# Patient Record
Sex: FEMALE | Race: WHITE | NOT HISPANIC OR LATINO | Employment: OTHER | URBAN - NONMETROPOLITAN AREA
[De-identification: names, ages, dates, MRNs, and addresses within clinical notes are randomized per-mention and may not be internally consistent; named-entity substitution may affect disease eponyms.]

---

## 2017-01-05 DIAGNOSIS — E11.65 UNCONTROLLED TYPE 2 DIABETES MELLITUS WITH CHRONIC KIDNEY DISEASE, UNSPECIFIED CKD STAGE, UNSPECIFIED LONG TERM INSULIN USE STATUS: ICD-10-CM

## 2017-01-05 DIAGNOSIS — E11.22 UNCONTROLLED TYPE 2 DIABETES MELLITUS WITH CHRONIC KIDNEY DISEASE, UNSPECIFIED CKD STAGE, UNSPECIFIED LONG TERM INSULIN USE STATUS: ICD-10-CM

## 2017-01-05 DIAGNOSIS — N18.9 UNCONTROLLED TYPE 2 DIABETES MELLITUS WITH CHRONIC KIDNEY DISEASE, UNSPECIFIED CKD STAGE, UNSPECIFIED LONG TERM INSULIN USE STATUS: ICD-10-CM

## 2017-01-05 RX ORDER — LISINOPRIL 40 MG/1
40 TABLET ORAL DAILY
Qty: 90 TAB | Refills: 3 | Status: ON HOLD | OUTPATIENT
Start: 2017-01-05 | End: 2017-04-09

## 2017-01-26 ENCOUNTER — OFFICE VISIT (OUTPATIENT)
Dept: URGENT CARE | Facility: PHYSICIAN GROUP | Age: 67
End: 2017-01-26
Payer: MEDICARE

## 2017-01-26 ENCOUNTER — HOSPITAL ENCOUNTER (OUTPATIENT)
Dept: LAB | Facility: MEDICAL CENTER | Age: 67
End: 2017-01-26
Attending: INTERNAL MEDICINE
Payer: MEDICARE

## 2017-01-26 VITALS
HEIGHT: 64 IN | WEIGHT: 200 LBS | TEMPERATURE: 97.2 F | HEART RATE: 115 BPM | RESPIRATION RATE: 20 BRPM | BODY MASS INDEX: 34.15 KG/M2 | OXYGEN SATURATION: 88 % | SYSTOLIC BLOOD PRESSURE: 130 MMHG | DIASTOLIC BLOOD PRESSURE: 92 MMHG

## 2017-01-26 DIAGNOSIS — K60.2 ANAL FISSURE: ICD-10-CM

## 2017-01-26 DIAGNOSIS — K21.00 GASTROESOPHAGEAL REFLUX DISEASE WITH ESOPHAGITIS: ICD-10-CM

## 2017-01-26 DIAGNOSIS — K59.00 CONSTIPATION, UNSPECIFIED CONSTIPATION TYPE: ICD-10-CM

## 2017-01-26 LAB
ALBUMIN SERPL BCP-MCNC: 3.7 G/DL (ref 3.2–4.9)
ALBUMIN/GLOB SERPL: 1.2 G/DL
ALP SERPL-CCNC: 70 U/L (ref 30–99)
ALT SERPL-CCNC: 9 U/L (ref 2–50)
ANION GAP SERPL CALC-SCNC: 6 MMOL/L (ref 0–11.9)
AST SERPL-CCNC: 13 U/L (ref 12–45)
BILIRUB SERPL-MCNC: 0.5 MG/DL (ref 0.1–1.5)
BUN SERPL-MCNC: 22 MG/DL (ref 8–22)
CALCIUM SERPL-MCNC: 9.1 MG/DL (ref 8.5–10.5)
CHLORIDE SERPL-SCNC: 104 MMOL/L (ref 96–112)
CHOLEST SERPL-MCNC: 134 MG/DL (ref 100–199)
CO2 SERPL-SCNC: 27 MMOL/L (ref 20–33)
CREAT SERPL-MCNC: 1.32 MG/DL (ref 0.5–1.4)
CREAT UR-MCNC: 54.5 MG/DL
EST. AVERAGE GLUCOSE BLD GHB EST-MCNC: 169 MG/DL
GLOBULIN SER CALC-MCNC: 3.2 G/DL (ref 1.9–3.5)
GLUCOSE SERPL-MCNC: 119 MG/DL (ref 65–99)
HBA1C MFR BLD: 7.5 % (ref 0–5.6)
HDLC SERPL-MCNC: 39 MG/DL
LDLC SERPL CALC-MCNC: 81 MG/DL
MICROALBUMIN UR-MCNC: 87.6 MG/DL
MICROALBUMIN/CREAT UR: 1607 MG/G (ref 0–30)
POTASSIUM SERPL-SCNC: 4.5 MMOL/L (ref 3.6–5.5)
PROT SERPL-MCNC: 6.9 G/DL (ref 6–8.2)
SODIUM SERPL-SCNC: 137 MMOL/L (ref 135–145)
TRIGL SERPL-MCNC: 72 MG/DL (ref 0–149)

## 2017-01-26 PROCEDURE — 80053 COMPREHEN METABOLIC PANEL: CPT

## 2017-01-26 PROCEDURE — G8482 FLU IMMUNIZE ORDER/ADMIN: HCPCS | Performed by: PHYSICIAN ASSISTANT

## 2017-01-26 PROCEDURE — 3288F FALL RISK ASSESSMENT DOCD: CPT | Performed by: PHYSICIAN ASSISTANT

## 2017-01-26 PROCEDURE — 3017F COLORECTAL CA SCREEN DOC REV: CPT | Mod: 1P | Performed by: PHYSICIAN ASSISTANT

## 2017-01-26 PROCEDURE — 4040F PNEUMOC VAC/ADMIN/RCVD: CPT | Performed by: PHYSICIAN ASSISTANT

## 2017-01-26 PROCEDURE — 80061 LIPID PANEL: CPT

## 2017-01-26 PROCEDURE — 4004F PT TOBACCO SCREEN RCVD TLK: CPT | Performed by: PHYSICIAN ASSISTANT

## 2017-01-26 PROCEDURE — 1100F PTFALLS ASSESS-DOCD GE2>/YR: CPT | Performed by: PHYSICIAN ASSISTANT

## 2017-01-26 PROCEDURE — 99214 OFFICE O/P EST MOD 30 MIN: CPT | Performed by: PHYSICIAN ASSISTANT

## 2017-01-26 PROCEDURE — 0518F FALL PLAN OF CARE DOCD: CPT | Mod: 8P | Performed by: PHYSICIAN ASSISTANT

## 2017-01-26 PROCEDURE — 82570 ASSAY OF URINE CREATININE: CPT

## 2017-01-26 PROCEDURE — 36415 COLL VENOUS BLD VENIPUNCTURE: CPT

## 2017-01-26 PROCEDURE — 83036 HEMOGLOBIN GLYCOSYLATED A1C: CPT

## 2017-01-26 PROCEDURE — G8419 CALC BMI OUT NRM PARAM NOF/U: HCPCS | Performed by: PHYSICIAN ASSISTANT

## 2017-01-26 PROCEDURE — 82043 UR ALBUMIN QUANTITATIVE: CPT

## 2017-01-26 PROCEDURE — 3014F SCREEN MAMMO DOC REV: CPT | Mod: 8P | Performed by: PHYSICIAN ASSISTANT

## 2017-01-26 PROCEDURE — G8432 DEP SCR NOT DOC, RNG: HCPCS | Performed by: PHYSICIAN ASSISTANT

## 2017-01-26 RX ORDER — LIDOCAINE HYDROCHLORIDE 20 MG/ML
JELLY TOPICAL
Qty: 1 BOTTLE | Refills: 0 | Status: SHIPPED | OUTPATIENT
Start: 2017-01-26 | End: 2017-03-07

## 2017-01-26 RX ORDER — OMEPRAZOLE 20 MG/1
20 CAPSULE, DELAYED RELEASE ORAL DAILY
Qty: 90 CAP | Refills: 1 | Status: ON HOLD | OUTPATIENT
Start: 2017-01-26 | End: 2017-05-10

## 2017-01-26 ASSESSMENT — ENCOUNTER SYMPTOMS
PALPITATIONS: 0
HEARTBURN: 0
WEAKNESS: 0
WEIGHT LOSS: 0
DIARRHEA: 0
CONSTIPATION: 1
ABDOMINAL PAIN: 1
SHORTNESS OF BREATH: 0
DIAPHORESIS: 0
NAUSEA: 0
BACK PAIN: 0
COUGH: 0
RECTAL PAIN: 1
FEVER: 0
CHILLS: 0
VOMITING: 0
DIZZINESS: 0
BLOOD IN STOOL: 0

## 2017-01-26 NOTE — PROGRESS NOTES
Subjective:      Priya Callahan is a 66 y.o. female who presents with Constipation            Constipation  This is a recurrent problem. Episode onset: 6 days ago. The problem is unchanged. The stool is described as loose. The patient is not on a high fiber diet. She does not exercise regularly. There has not been adequate water intake. Associated symptoms include abdominal pain, hemorrhoids and rectal pain. Pertinent negatives include no back pain, diarrhea, difficulty urinating, fever, melena, nausea, vomiting or weight loss. She has tried stool softeners and laxatives for the symptoms.       Review of Systems   Constitutional: Positive for malaise/fatigue. Negative for fever, chills, weight loss and diaphoresis.   Respiratory: Negative for cough and shortness of breath.    Cardiovascular: Negative for chest pain and palpitations.   Gastrointestinal: Positive for abdominal pain, constipation, rectal pain and hemorrhoids. Negative for heartburn, nausea, vomiting, diarrhea, blood in stool and melena.   Genitourinary: Negative for difficulty urinating.   Musculoskeletal: Negative for back pain.   Neurological: Negative for dizziness and weakness.     All other systems reviewed and are negative.    PMH:  has a past medical history of COPD; ASTHMA; Hypertension; Pneumonia; Bronchitis; Unspecified urinary incontinence; Fall; Infectious disease; Arthritis; Fibromyalgia; Neuropathy (CMS-HCC); Other specified symptom associated with female genital organs; Diabetes; Hepatitis C; Congestive heart failure (CMS-[facility]) (2013); Indigestion; GERD (gastroesophageal reflux disease); Breath shortness; Sleep apnea; Disorder of thyroid; Backpain; Heart burn; Pain (9/13/2016); and Psychiatric problem (9/13/2016).  MEDS:   Current outpatient prescriptions:   •  lidocaine (URO-JET) 2 % Gel, Apply to affected area once daily as needed for pain relief., Disp: 1 Bottle, Rfl: 0  •  B-D UF III MINI PEN NEEDLES 31G X 5 MM Misc, USE TO  INJECT INSULIN DAILY, Disp: 100 Each, Rfl: 1  •  lisinopril (PRINIVIL, ZESTRIL) 40 MG tablet, Take 1 Tab by mouth every day., Disp: 90 Tab, Rfl: 3  •  FLUVIRIN PRESERVATIVE FREE 0.5 ML Suspension Prefilled Syringe, 0.5 mL by Intramuscular route Once., Disp: , Rfl:   •  oxycodone immediate release (ROXICODONE) 10 MG immediate release tablet, Take 1-2 Tabs by mouth every 6 hours as needed for Moderate Pain (back pain and knee pain)., Disp: 168 Tab, Rfl: 0  •  trazodone (DESYREL) 100 MG Tab, TAKE 3 TABLETS BY MOUTH NIGHTLY AT BEDTIME AS NEEDED FOR SLEEP, Disp: 90 Tab, Rfl: 3  •  LANTUS SOLOSTAR 100 UNIT/ML Solution Pen-injector injection, INJECT 5 TO 20 UNITS EVERY EVENING, Disp: 100 mL, Rfl: 1  •  pneumococcal 13-Lorrie Conj Vacc (PREVNAR 13) syringe, 0.5 mL by Intramuscular route Once PRN for up to 1 dose., Disp: 0.5 Each, Rfl: 0  •  Multiple Vitamins-Minerals (HAIR/SKIN/NAILS PO), Take  by mouth every day., Disp: , Rfl:   •  cyclobenzaprine (FLEXERIL) 10 MG Tab, TAKE 1 TABLET BY MOUTH THREE TIMES DAILY AS NEEDED FOR MILD PAIN, Disp: 90 Tab, Rfl: 11  •  lactulose 10 GM/15ML Solution, Take 30 mL by mouth 2 times a day as needed (constipation)., Disp: 500 mL, Rfl: 3  •  simvastatin (ZOCOR) 10 MG Tab, TAKE 1 TABLET BY MOUTH EVERY EVENING, Disp: 90 Tab, Rfl: 3  •  fluticasone-salmeterol (ADVAIR) 500-50 MCG/DOSE AEROSOL POWDER, BREATH ACTIVATED, Inhale 1 Puff by mouth every 12 hours., Disp: 1 Inhaler, Rfl: 5  •  Misc. Devices Misc, Life alert system., Disp: 1 Device, Rfl: 0  •  Misc. Devices Misc, Freestyle lite test strips; patient has DM type 2 Ell.65 with hyperglycemia and tests 3 times per day, Disp: 300 Strip, Rfl: 3  •  albuterol (PROVENTIL) 2.5mg/3ml Nebu Soln solution for nebulization, 3 mL by Nebulization route every four hours as needed for Shortness of Breath., Disp: 75 mL, Rfl: 3  •  escitalopram (LEXAPRO) 10 MG Tab, Take 1 Tab by mouth every day., Disp: 90 Tab, Rfl: 3  •  albuterol (VENTOLIN OR PROVENTIL) 108  (90 BASE) MCG/ACT Aero Soln inhalation aerosol, Inhale 2 Puffs by mouth every 6 hours as needed for Shortness of Breath., Disp: 8.5 g, Rfl: 3  •  gabapentin (NEURONTIN) 600 MG tablet, Take 1 Tab by mouth 3 times a day., Disp: 270 Tab, Rfl: 3  •  montelukast (SINGULAIR) 10 MG Tab, Take 1 Tab by mouth every day., Disp: 90 Tab, Rfl: 3  •  omeprazole (PRILOSEC) 20 MG delayed-release capsule, Take 1 Cap by mouth every day., Disp: 90 Cap, Rfl: 3  •  levothyroxine (SYNTHROID) 75 MCG TABS, TAKE 1 TABLET BY MOUTH EVERY DAY, Disp: 90 Tab, Rfl: 3  •  Non Formulary Request, Antioxidants, Disp: , Rfl:   •  lidocaine (XYLOCAINE) 5 % OINT, Apply  to affected area(s) as needed., Disp: , Rfl:   •  FREESTYLE LITE strip, USE TO TEST BLOOD SUGAR TWICE DAILY OR AS DIRECTED, Disp: 100 Strip, Rfl: 3  ALLERGIES:   Allergies   Allergen Reactions   • Keflex Rash     Sever rash   • Codeine Nausea     Severe stomach pain   • Food Diarrhea     Lemon and Lime allergy   • Lyrica Nausea     Nausea, dizzy   • Sudafed Nausea and Unspecified     Chest pain, nausea     SURGHX:   Past Surgical History   Procedure Laterality Date   • Gyn surgery       hysterectomy    • Other orthopedic surgery       L knee replacement    • Other orthopedic surgery       R carpal tunnel    • Us-needle core bx-breast panel     • Lumpectomy Left      Left breast   • Pr inj dx/ther agnt paravert facet joint, bruce* Left 7/10/2015     Procedure: INJ PARA FACET L/S 1 LVL W/IG - L3-S1;  Surgeon: Xavier Arteaga D.O.;  Location: SURGERY [facility]S;  Service: Pain Management   • Pr inj dx/ther agnt paravert facet joint, bruce*  7/10/2015     Procedure: INJ PARA FACET L/S 2D LVL W/IG;  Surgeon: Xavier Arteaga D.O.;  Location: SURGERY [facility] ORS;  Service: Pain Management   • Pr inj dx/ther agnt paravert facet joint, bruce*  7/10/2015     Procedure: INJ PARA FACET L/S 3D LVL W/IG;  Surgeon: Xavier Arteaga D.O.;  Location: [facility];  Service: Pain  Management   • Pr inject nerv blck,othr periph nerv  7/10/2015     Procedure: INJ-ANES AGENT-OTHER PHER.NRVE;  Surgeon: Xavier Arteaga D.O.;  Location: SURGERY SUR[facility];  Service: Pain Management   • Pr inj dx/ther agnt paravert facet joint, bruce* Left 7/17/2015     Procedure: INJ PARA FACET L/S 1 LVL W/IG - L3-S1;  Surgeon: Xavier Arteaga D.O.;  Location: SURGERY SURGICAL [facility];  Service: Pain Management   • Pr inj dx/ther agnt paravert facet joint, bruce*  7/17/2015     Procedure: INJ PARA FACET L/S 2D LVL W/IG;  Surgeon: Xavier Arteaga D.O.;  Location: SURGERY [facility];  Service: Pain Management   • Pr inj dx/ther agnt paravert facet joint, bruce*  7/17/2015     Procedure: INJ PARA FACET L/S 3D LVL W/IG;  Surgeon: Xavier Arteaga D.O.;  Location: SURGERY SURGICAL [facility] ORS;  Service: Pain Management   • Pr inject nerv blck,othr periph nerv  7/17/2015     Procedure: INJ-ANES AGENT-OTHER PHER.NRVE;  Surgeon: Xavier Arteaga D.O.;  Location: SURGERY SURGICAL [facility];  Service: Pain Management   • Pr dstr nrolytc agnt parverteb fct sngl lmbr/sacral Left 10/2/2015     Procedure: NEURO DEST FACET L/S W/IG SNGL - L3-S1;  Surgeon: Xavier Arteaga D.O.;  Location: SURGERY [facility] ORS;  Service: Pain Management   • Pr dstr nrolytc agnt parverteb fct addl lmbr/sacral  10/2/2015     Procedure: NEURO DEST FACET L/S W/IG ADDL;  Surgeon: Xavier Arteaga D.O.;  Location: SURGERY SURGICAL [facility];  Service: Pain Management   • Pr inject rx other periph nerve  10/2/2015     Procedure: NEUROLYTIC DEST-OTHER NERVE;  Surgeon: Xavier Arteaga D.O.;  Location: SURGERY SURGICAL [facility];  Service: Pain Management   • Pr dstr nrolytc agnt parverteb fct addl lmbr/sacral  10/2/2015     Procedure: NEURO DEST FACET L/S W/IG ADDL;  Surgeon: Xavier Arteaga D.O.;  Location: [facility];  Service: Pain Management   • Pr dstr nrolytc agnt parverteb fct sngl lmbr/sacral Right  11/6/2015     Procedure: NEURO DEST FACET L/S W/IG SNGL - L3-S1;  Surgeon: Xavier Arteaga D.O.;  Location: SURGERY [facility];  Service: Pain Management   • Pr dstr nrolytc agnt parverteb fct addl lmbr/sacral  11/6/2015     Procedure: NEURO DEST FACET L/S W/IG ADDL;  Surgeon: Xavier Arteaga D.O.;  Location: SURGERY [facility];  Service: Pain Management   • Pr inject rx other periph nerve  11/6/2015     Procedure: NEUROLYTIC DEST-OTHER NERVE;  Surgeon: Xavier Arteaga D.O.;  Location: SURGERY [facility];  Service: Pain Management   • Pr dstr nrolytc agnt parverteb fct addl lmbr/sacral  11/6/2015     Procedure: NEURO DEST FACET L/S W/IG ADDL;  Surgeon: Xavier Arteaga D.O.;  Location: SURGERY [facility];  Service: Pain Management   • Pr dstr nrolytc agnt parverteb fct sngl lmbr/sacral Left 9/16/2016     Procedure: NEURO DEST FACET L/S W/IG SNGL - L3-S1;  Surgeon: Xavier Arteaga D.O.;  Location: SURGERY [facility];  Service: Pain Management   • Pr dstr nrolytc agnt parverteb fct addl lmbr/sacral  9/16/2016     Procedure: NEURO DEST FACET L/S W/IG ADDL;  Surgeon: Xavier Arteaga D.O.;  Location: SURGERY [facility];  Service: Pain Management   • Pr dstr nrolytc agnt parverteb fct addl lmbr/sacral  9/16/2016     Procedure: NEURO DEST FACET L/S W/IG ADDL;  Surgeon: Xavier Arteaga D.O.;  Location: SURGERY [facility];  Service: Pain Management   • Pr fluoroscopic guidance needle placement  9/16/2016     Procedure: FLOURO GUIDE NEEDLE PLACEMENT;  Surgeon: Xavier Arteaga D.O.;  Location: SURGERY [facility];  Service: Pain Management     SOCHX:  reports that she has been smoking Cigarettes.  She has a 50 pack-year smoking history. She has never used smokeless tobacco. She reports that she does not drink alcohol or use illicit drugs.  : Family history was reviewed, no pertinent findings to report  Medications, Allergies, and current problem  "list reviewed today in Epic       Objective:     /92 mmHg  Pulse 115  Temp(Src) 36.2 °C (97.2 °F)  Resp 20  Ht 1.626 m (5' 4.02\")  Wt 90.719 kg (200 lb)  BMI 34.31 kg/m2  SpO2 88%     Physical Exam   Constitutional: She is oriented to person, place, and time. Vital signs are normal. She appears well-developed and well-nourished.   Neck: Normal range of motion. Neck supple.   Cardiovascular: Normal rate, regular rhythm, normal heart sounds, intact distal pulses and normal pulses.    Pulmonary/Chest: Effort normal and breath sounds normal.   Abdominal: Soft. Bowel sounds are normal. She exhibits no shifting dullness, no distension, no abdominal bruit, no pulsatile midline mass and no mass. There is tenderness. There is no rigidity, no rebound, no guarding, no CVA tenderness, no tenderness at McBurney's point and negative Troncoso's sign. No hernia.   Suprapubic area pain to palpation   Musculoskeletal: She exhibits no edema, tenderness or deformity.   Neurological: She is alert and oriented to person, place, and time. She has normal reflexes. She displays normal reflexes. She exhibits normal muscle tone. Coordination normal.   Skin: Skin is warm and dry.   Psychiatric: She has a normal mood and affect. Her behavior is normal. Judgment and thought content normal.   Vitals reviewed.              Assessment/Plan:   Patient is a 66-year-old renal presents with constipation and severe pain in the rectal area. 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 She states that it is extremely painful to defecate. She has some suprapubic pain. She denies nausea vomiting. She currently has an appetite. Last bowel movement was 6 days ago. She is taking numerous stool softeners and laxatives. Vital signs are normal except for some tachycardia. Lungs are clear to auscultation Abdomen: Soft, tenderness in suprapubic area, nondistended. Normal " bowel sounds. No hepatosplenomegaly or masses, or hernias. No rebound or guarding. Rectal vault was examined which contained a large amount of loose stool. Guaiac was done which showed positive. History reveals CT scans that show no mention of a fistula. She has been diagnosed with rectal tear in the past. Stool in the rectal vault made it difficult to evaluate for a anal fissure or hemorrhoids. Patient declines bowel prep and will trial lidocaine jelly for the rectum area to help with bowel movements. She is to follow-up with her primary care. ED precautions given to the patient.  1. Anal fissure    - lidocaine (URO-JET) 2 % Gel; Apply to affected area once daily as needed for pain relief.  Dispense: 1 Bottle; Refill: 0    2. Constipation, unspecified constipation type  -Liquids, laxatives, Fleet enema    Differential diagnosis, natural history, supportive care, and indications for immediate follow-up discussed at length.   Follow-up with primary care provider within 4-5 days, emergency room precautions discussed.  Patient and/or family appears understanding of information.

## 2017-01-26 NOTE — MR AVS SNAPSHOT
"        Priya Callahan   2017 12:10 PM   Office Visit   MRN: [id number]    Department:  [facility]   Dept Phone:  [phone]    Description:  Female : 1950   Provider:  Zenon Velásquez PA-C           Reason for Visit     Constipation severe pain in buttocks      Allergies as of 2017     Allergen Noted Reactions    Keflex 10/29/2013   Rash    Sever rash    Codeine 10/29/2013   Nausea    Severe stomach pain    Food 2013   Diarrhea    Lemon and Lime allergy    Lyrica 10/29/2013   Nausea    Nausea, dizzy    Sudafed 10/29/2013   Nausea, Unspecified    Chest pain, nausea      You were diagnosed with     Anal fissure   [565.0.ICD-9-CM]       Constipation, unspecified constipation type   [0200781]         Vital Signs     Blood Pressure Pulse Temperature Respirations Height Weight    130/92 mmHg 115 36.2 °C (97.2 °F) 20 1.626 m (5' 4.02\") 90.719 kg (200 lb)    Body Mass Index Oxygen Saturation Smoking Status             34.31 kg/m2 88% Current Every Day Smoker         Basic Information     Date Of Birth Sex Race Ethnicity Preferred Language    1950 Female White Non- English      Your appointments     2017  2:45 PM   Adult Draw/Collection with [facility] (--)    [address] [place] [address] [address] [place] [address] [phone]            2017  1:00 PM   Established Patient with Mickie Lawson M.D.   [facility] [place] ([place])    [address] [place] [address]  [place] [address] [phone]           You will be receiving a confirmation call a few days before your appointment from our automated call confirmation system.              Problem List              ICD-10-CM Priority Class Noted - Resolved    Morbid obesity (CMS-HCC) E66.01 Low  10/30/2013 - Present    DM type 2, uncontrolled, with renal complications (CMS-HCC) E11.29, E11.65 Medium  10/30/2013 - Present    Chronic pain G89.29 Low  10/30/2013 - Present    Iron deficiency " anemia D50.9 Medium  10/30/2013 - Present    Acute on chronic diastolic CHF (congestive heart failure), NYHA class 1 (CMS-HCC) I50.33   11/20/2013 - Present    Major depressive disorder F32.9   11/20/2013 - Present    COPD (chronic obstructive pulmonary disease) (CMS-HCC) J44.9   11/20/2013 - Present    HTN (hypertension) I10   11/20/2013 - Present    Chronic kidney disease, stage 3 N18.3   1/6/2014 - Present    Chronic knee pain M25.569, G89.29   2/25/2014 - Present    Osteoarthritis M19.90   2/25/2014 - Present    Insomnia G47.00   2/25/2014 - Present    Hair loss L65.9   2/25/2014 - Present    Hypothyroidism E03.9   4/1/2014 - Present    Vitamin D deficiency E55.9   6/12/2014 - Present    Lumbar spondylosis with myelopathy M47.16   6/17/2014 - Present    Nocturnal hypoxia G47.34   7/21/2014 - Present    Pain of right heel M79.671   7/21/2014 - Present    Chronic pain syndrome G89.4   8/28/2014 - Present    EDITH (obstructive sleep apnea) G47.33   1/27/2015 - Present    Unintended weight loss R63.4   1/27/2015 - Present    Encounter for long-term opiate analgesic use Z79.891   1/29/2015 - Present    Hypertensive heart disease with heart failure (CMS-HCC) I11.0 High  4/27/2015 - Present    Chronic respiratory failure (CMS-HCC) J96.10   4/28/2015 - Present    Chronic constipation K59.09   7/7/2015 - Present    Pulmonary nodules R91.8   7/7/2015 - Present    Lumbosacral spondylosis without myelopathy M47.817   7/10/2015 - Present    Osteoarthritis, knee M17.9   9/2/2015 - Present    Osteoarthritis of spine with radiculopathy, lumbar region M47.26   10/2/2015 - Present    Bilateral hand pain M79.641, M79.642   12/21/2015 - Present    Primary osteoarthritis of both hands M19.041, M19.042   12/21/2015 - Present    Gastroesophageal reflux disease with esophagitis K21.0   12/22/2015 - Present    Myoclonic jerking G25.3   4/12/2016 - Present    Cough R05   8/18/2016 - Present    Other spondylosis with radiculopathy, lumbar  region M47.26   9/16/2016 - Present    Tobacco abuse disorder Z72.0   9/27/2016 - Present    Diabetic foot infection (CMS-HCC) E11.69, L08.9   9/27/2016 - Present    Toenail fungus B35.1   11/21/2016 - Present      Health Maintenance        Date Due Completion Dates    RETINAL SCREENING 9/26/1967 ---    IMM DTaP/Tdap/Td Vaccine (1 - Tdap) 9/26/1968 ---    IMM ZOSTER VACCINE 9/26/2009 ---    BONE DENSITY 9/26/2014 ---    MAMMOGRAM 2/24/2016 2/24/2015, 2/24/2015, 2/24/2015, 2/24/2015, 2/24/2015, 2/17/2015    A1C SCREENING 11/27/2016 5/27/2016, 12/17/2015, 8/11/2015, 2/24/2015, 6/2/2014, 8/31/2012    FASTING LIPID PROFILE 12/17/2016 12/17/2015, 2/24/2015, 6/2/2014, 8/31/2012    URINE ACR / MICROALBUMIN 12/17/2016 12/17/2015, 2/24/2015, 8/31/2012    COLON CANCER SCREENING ANNUAL FIT 12/28/2016 12/28/2015, 3/7/2015    SERUM CREATININE 5/27/2017 5/27/2016, 12/17/2015, 8/11/2015, 5/8/2015, 3/19/2015, 2/27/2015, 2/24/2015, 6/2/2014, 1/13/2014, 11/22/2013, 11/21/2013, 11/20/2013, 11/19/2013, 11/14/2013, 11/13/2013, 11/12/2013, 11/11/2013, 11/10/2013, 11/9/2013, 11/8/2013, 11/7/2013, 11/6/2013, 11/5/2013, 11/4/2013, 11/3/2013, 11/2/2013, 11/1/2013, 10/31/2013, 10/30/2013, 10/29/2013, 8/31/2012    DIABETES MONOFILAMENT / LE EXAM 6/7/2017 6/7/2016            Current Immunizations     13-VALENT PCV PREVNAR 9/28/2016    Influenza TIV (IM) 9/27/2016, 11/11/2013 11:45 AM    Influenza Vaccine Adult HD 10/20/2015    Influenza Vaccine Quad Inj (Pf) 10/22/2013    Influenza Vaccine Quad Inj (Preserved) 9/12/2012    Pneumococcal Vaccine (UF)Historical Data 9/1/2012    Pneumococcal polysaccharide vaccine (PPSV-23) 12/11/2014, 10/22/2013      Below and/or attached are the medications your provider expects you to take. Review all of your home medications and newly ordered medications with your provider and/or pharmacist. Follow medication instructions as directed by your provider and/or pharmacist. Please keep your medication list with you  and share with your provider. Update the information when medications are discontinued, doses are changed, or new medications (including over-the-counter products) are added; and carry medication information at all times in the event of emergency situations     Allergies:  KEFLEX - Rash     CODEINE - Nausea     FOOD - Diarrhea     LYRICA - Nausea     SUDAFED - Nausea,Unspecified               Medications  Valid as of: January 26, 2017 -  1:30 PM    Generic Name Brand Name Tablet Size Instructions for use    Albuterol Sulfate (Aero Soln) albuterol 108 (90 BASE) MCG/ACT Inhale 2 Puffs by mouth every 6 hours as needed for Shortness of Breath.        Albuterol Sulfate (Nebu Soln) PROVENTIL 2.5mg/3ml 3 mL by Nebulization route every four hours as needed for Shortness of Breath.        Cyclobenzaprine HCl (Tab) FLEXERIL 10 MG TAKE 1 TABLET BY MOUTH THREE TIMES DAILY AS NEEDED FOR MILD PAIN        Escitalopram Oxalate (Tab) LEXAPRO 10 MG Take 1 Tab by mouth every day.        Fluticasone-Salmeterol (AEROSOL POWDER, BREATH ACTIVATED) ADVAIR 500-50 MCG/DOSE Inhale 1 Puff by mouth every 12 hours.        Gabapentin (Tab) NEURONTIN 600 MG Take 1 Tab by mouth 3 times a day.        Glucose Blood (Strip) FREESTYLE LITE  USE TO TEST BLOOD SUGAR TWICE DAILY OR AS DIRECTED        Influenza Vac Types A & B PF (Suspension Prefilled Syringe) FLUVIRIN PRESERVATIVE FREE 0.5 ML 0.5 mL by Intramuscular route Once.        Insulin Glargine (Solution Pen-injector) LANTUS SOLOSTAR 100 UNIT/ML INJECT 5 TO 20 UNITS EVERY EVENING        Insulin Pen Needle (Misc) B-D UF III MINI PEN NEEDLES 31G X 5 MM USE TO INJECT INSULIN DAILY        Lactulose (Solution) lactulose 10 GM/15ML Take 30 mL by mouth 2 times a day as needed (constipation).        Levothyroxine Sodium (Tab) SYNTHROID 75 MCG TAKE 1 TABLET BY MOUTH EVERY DAY        Lidocaine (Ointment) XYLOCAINE 5 % Apply  to affected area(s) as needed.        lidocaine (URO-JET) 2 % Gel (Gel) URO-JET 2 %  Apply to affected area once daily as needed for pain relief.        Lisinopril (Tab) PRINIVIL, ZESTRIL 40 MG Take 1 Tab by mouth every day.        Misc. Devices (Misc) Misc. Devices  Freestyle lite test strips; patient has DM type 2 Ell.65 with hyperglycemia and tests 3 times per day        Misc. Devices (Misc) Misc. Devices  Life alert system.        Montelukast Sodium (Tab) SINGULAIR 10 MG Take 1 Tab by mouth every day.        Multiple Vitamins-Minerals   Take  by mouth every day.        Non Formulary Request Non Formulary Request  Antioxidants        Omeprazole (CAPSULE DELAYED RELEASE) PRILOSEC 20 MG Take 1 Cap by mouth every day.        OxyCODONE HCl (Tab) ROXICODONE 10 MG Take 1-2 Tabs by mouth every 6 hours as needed for Moderate Pain (back pain and knee pain).        Pneumococcal 13-Lorrie Conj Vacc (Suspension) PREVNAR 13  0.5 mL by Intramuscular route Once PRN for up to 1 dose.        Simvastatin (Tab) ZOCOR 10 MG TAKE 1 TABLET BY MOUTH EVERY EVENING        TraZODone HCl (Tab) DESYREL 100 MG TAKE 3 TABLETS BY MOUTH NIGHTLY AT BEDTIME AS NEEDED FOR SLEEP        .                 Medicines prescribed today were sent to:     [facility]    [address] [place] [id number]    Phone: [phone] Fax: [phone]    Open 24 Hours?: No      Medication refill instructions:       If your prescription bottle indicates you have medication refills left, it is not necessary to call your provider’s office. Please contact your pharmacy and they will refill your medication.    If your prescription bottle indicates you do not have any refills left, you may request refills at any time through one of the following ways: The online [employer] system (except Urgent Care), by calling your provider’s office, or by asking your pharmacy to contact your provider’s office with a refill request. Medication refills are processed only during regular business hours and may not be available until the next  business day. Your provider may request additional information or to have a follow-up visit with you prior to refilling your medication.   *Please Note: Medication refills are assigned a new Rx number when refilled electronically. Your pharmacy may indicate that no refills were authorized even though a new prescription for the same medication is available at the pharmacy. Please request the medicine by name with the pharmacy before contacting your provider for a refill.        Other Notes About Your Plan     On pain contract with Dr. Lawson  Last UDS: 3/26/2015 Dr Lawson  Contolled Substance agreement signed: 8/28/2014    Comprehensive Medication Review: appt 11.18.2014 in [place].                 [facility] Status: Patient Declined

## 2017-01-31 DIAGNOSIS — Z79.899 MEDICATION MANAGEMENT: ICD-10-CM

## 2017-01-31 NOTE — TELEPHONE ENCOUNTER
Was the patient seen in the last year in this department? Yes  Appt scheduled for 2/8/17, needing RX immediately, having hard time breathing. RX was denied yesterday by another [facility]    Does patient have an active prescription for medications requested? No      Received Request Via: Patient

## 2017-02-08 ENCOUNTER — OFFICE VISIT (OUTPATIENT)
Dept: MEDICAL GROUP | Facility: PHYSICIAN GROUP | Age: 67
End: 2017-02-08
Payer: MEDICARE

## 2017-02-08 VITALS
TEMPERATURE: 98 F | OXYGEN SATURATION: 90 % | SYSTOLIC BLOOD PRESSURE: 122 MMHG | HEIGHT: 64 IN | WEIGHT: 211 LBS | HEART RATE: 78 BPM | DIASTOLIC BLOOD PRESSURE: 80 MMHG | BODY MASS INDEX: 36.02 KG/M2 | RESPIRATION RATE: 16 BRPM

## 2017-02-08 DIAGNOSIS — M47.26 OSTEOARTHRITIS OF SPINE WITH RADICULOPATHY, LUMBAR REGION: ICD-10-CM

## 2017-02-08 DIAGNOSIS — Z72.0 TOBACCO ABUSE DISORDER: ICD-10-CM

## 2017-02-08 DIAGNOSIS — J96.11 CHRONIC RESPIRATORY FAILURE WITH HYPOXIA (HCC): ICD-10-CM

## 2017-02-08 DIAGNOSIS — J43.9 PULMONARY EMPHYSEMA, UNSPECIFIED EMPHYSEMA TYPE (HCC): ICD-10-CM

## 2017-02-08 DIAGNOSIS — E03.9 HYPOTHYROIDISM, UNSPECIFIED TYPE: ICD-10-CM

## 2017-02-08 DIAGNOSIS — G89.29 CHRONIC KNEE PAIN, UNSPECIFIED LATERALITY: ICD-10-CM

## 2017-02-08 DIAGNOSIS — M47.16 LUMBAR SPONDYLOSIS WITH MYELOPATHY: ICD-10-CM

## 2017-02-08 DIAGNOSIS — Z79.891 CHRONIC USE OF OPIATE DRUGS THERAPEUTIC PURPOSES: ICD-10-CM

## 2017-02-08 DIAGNOSIS — N18.30 CHRONIC KIDNEY DISEASE, STAGE 3 (HCC): ICD-10-CM

## 2017-02-08 DIAGNOSIS — D50.9 IRON DEFICIENCY ANEMIA, UNSPECIFIED IRON DEFICIENCY ANEMIA TYPE: ICD-10-CM

## 2017-02-08 DIAGNOSIS — M25.569 CHRONIC KNEE PAIN, UNSPECIFIED LATERALITY: ICD-10-CM

## 2017-02-08 DIAGNOSIS — E55.9 VITAMIN D DEFICIENCY: ICD-10-CM

## 2017-02-08 DIAGNOSIS — Z12.11 SCREEN FOR COLON CANCER: ICD-10-CM

## 2017-02-08 DIAGNOSIS — M47.817 LUMBOSACRAL SPONDYLOSIS WITHOUT MYELOPATHY: ICD-10-CM

## 2017-02-08 DIAGNOSIS — Z79.891 ENCOUNTER FOR LONG-TERM OPIATE ANALGESIC USE: ICD-10-CM

## 2017-02-08 PROCEDURE — 99214 OFFICE O/P EST MOD 30 MIN: CPT | Performed by: INTERNAL MEDICINE

## 2017-02-08 PROCEDURE — 1036F TOBACCO NON-USER: CPT | Performed by: INTERNAL MEDICINE

## 2017-02-08 PROCEDURE — 1100F PTFALLS ASSESS-DOCD GE2>/YR: CPT | Performed by: INTERNAL MEDICINE

## 2017-02-08 PROCEDURE — G8432 DEP SCR NOT DOC, RNG: HCPCS | Performed by: INTERNAL MEDICINE

## 2017-02-08 PROCEDURE — G8419 CALC BMI OUT NRM PARAM NOF/U: HCPCS | Performed by: INTERNAL MEDICINE

## 2017-02-08 PROCEDURE — 3045F PR MOST RECENT HEMOGLOBIN A1C LEVEL 7.0-9.0%: CPT | Performed by: INTERNAL MEDICINE

## 2017-02-08 PROCEDURE — G8482 FLU IMMUNIZE ORDER/ADMIN: HCPCS | Performed by: INTERNAL MEDICINE

## 2017-02-08 PROCEDURE — 0518F FALL PLAN OF CARE DOCD: CPT | Mod: 8P | Performed by: INTERNAL MEDICINE

## 2017-02-08 PROCEDURE — 3014F SCREEN MAMMO DOC REV: CPT | Mod: 8P | Performed by: INTERNAL MEDICINE

## 2017-02-08 PROCEDURE — 4040F PNEUMOC VAC/ADMIN/RCVD: CPT | Performed by: INTERNAL MEDICINE

## 2017-02-08 PROCEDURE — 3288F FALL RISK ASSESSMENT DOCD: CPT | Performed by: INTERNAL MEDICINE

## 2017-02-08 PROCEDURE — 3017F COLORECTAL CA SCREEN DOC REV: CPT | Mod: 1P | Performed by: INTERNAL MEDICINE

## 2017-02-08 RX ORDER — OXYCODONE HYDROCHLORIDE 10 MG/1
10-20 TABLET ORAL EVERY 6 HOURS PRN
Qty: 168 TAB | Refills: 0 | Status: ON HOLD | OUTPATIENT
Start: 2017-02-08 | End: 2017-05-10

## 2017-02-08 RX ORDER — OXYCODONE HYDROCHLORIDE 10 MG/1
10-20 TABLET ORAL EVERY 6 HOURS PRN
Qty: 168 TAB | Refills: 0 | Status: SHIPPED | OUTPATIENT
Start: 2017-02-08 | End: 2017-02-08 | Stop reason: SDUPTHER

## 2017-02-08 ASSESSMENT — LIFESTYLE VARIABLES: HISTORY_ALCOHOL_USE: 0

## 2017-02-08 ASSESSMENT — ENCOUNTER SYMPTOMS: DEPRESSION: 1

## 2017-02-08 NOTE — ASSESSMENT & PLAN NOTE
Patient has type 2 diabetes and recently had mild worsening of control of disease. Previous hemoglobin A1c was 7%, most recent was 7.5%. She takes 20 units of insulin a day. She has noted some improvement recently with sugars in the low 100s in the morning. She does continue on ACE inhibitor and statin. We discussed continuing to monitor.

## 2017-02-08 NOTE — ASSESSMENT & PLAN NOTE
Patient has lumbar stenosis and has followed with Dr. Arteaga for injections and myself for pain management. Recently pain has worsened and we discussed increasing her gabapentin to 1200 mg twice per day. She cannot take anti-inflammatories due to kidney disease. She is not currently taking any Tylenol and I let her know she take 1000 mg up to 3 times a day.  Chronic pain recheck:   Last dose of controlled substance: this am  Chronic pain treated with oxycodone 10-20mg taken 2-3 times a day    She  reports that she does not drink alcohol.  She  reports that she does not use illicit drugs.    Consequences of Chronic Opiate therapy:  (5 A's)  Analgesia: Compared to no treatment or prior treatment, pain is currently not changed  Activity: not changed  Adverse Events: She denies constipation  Aberrant Behaviors: She reports she is taking medication as prescribed and is not veering from agreed treatment regimen. There have been no inappropriate refills or lost/stolen meds reported.   Affect/Mood: Pain is not impacting patient's mood.  Patient denies depression/anxiety.    Nonnarcotic treatments that are being used: muscle relaxers and Gabapentin. Treatment goals discussed.    Last order of CONTROLLED SUBSTANCE TREATMENT AGREEMENT was found on 6/7/2016 from Office Visit on 6/7/2016     UDS Summary     Patient has no health maintenance due at this time        Most recent UDS reviewed today and is consistent with prescribed medications.    Pain management agreement initiated/updated and signed on: 6/16  Urine drug screen done: 6/16, which was reviewed today and is consistent with prescribed medications.    I have reviewed the medical records, the Prescription Monitoring Program and I have determined that controlled substance treatment is medically indicated.

## 2017-02-08 NOTE — MR AVS SNAPSHOT
Priya Clarkpson   2017 1:00 PM   Office Visit   MRN: [id number]    Department:  [facility]   Dept Phone:  [phone]    Description:  Female : 1950   Provider:  Mickie Lawson M.D.           Reason for Visit     Results fv labs      Allergies as of 2017     Allergen Noted Reactions    Keflex 10/29/2013   Rash    Sever rash    Codeine 10/29/2013   Nausea    Severe stomach pain    Food 2013   Diarrhea    Lemon and Lime allergy    Lyrica 10/29/2013   Nausea    Nausea, dizzy    Sudafed 10/29/2013   Nausea, Unspecified    Chest pain, nausea      You were diagnosed with     Uncontrolled type 2 diabetes mellitus with stage 3 chronic kidney disease, with long-term current use of insulin (CMS-[facility])   [4747591]   Uncontrolled, will continue on insulin    Iron deficiency anemia, unspecified iron deficiency anemia type   [0301089]   Controlled, will recheck labs    Chronic kidney disease, stage 3   [539921]   Control, monitoring    Tobacco abuse disorder   [739306]   Controlled, recently quit    Vitamin D deficiency   [3123858]   Control, on vitamin D    Hypothyroidism, unspecified type   [0024865]   Controlled, on levothyroxine    Pulmonary emphysema, unspecified emphysema type (CMS-[facility])   [5653459]   Controlled, on inhalers    Chronic respiratory failure with hypoxia (CMS-HCC)   [910595]   Control, encourage patient to continue on oxygen at night    Chronic knee pain, unspecified laterality   [773218]   Control, patient continues with Dr. Arteaga and myself.    Osteoarthritis of spine with radiculopathy, lumbar region   [2108563]       Lumbar spondylosis with myelopathy   [776935]   Control, follows the clinic and Dr. Arteaga    Encounter for long-term opiate analgesic use   [582742]       Chronic use of opiate drugs therapeutic purposes   [7056305]       Screen for colon cancer   [294368]       Lumbosacral spondylosis without myelopathy   [721.3.ICD-9-CM]         Vital  "Signs     Blood Pressure Pulse Temperature Respirations Height Weight    122/80 mmHg 78 36.7 °C (98 °F) 16 1.626 m (5' 4\") 95.709 kg (211 lb)    Body Mass Index Oxygen Saturation Smoking Status             36.20 kg/m2 90% Former Smoker         Basic Information     Date Of Birth Sex Race Ethnicity Preferred Language    1950 Female White Non- English      Your appointments     May 02, 2017  2:20 PM   Diabetes Care Visit with Mickie Lawson M.D., [place] [facility] RN   [facility]    [address] [place] [address]  [place] [address] [phone]           You will be receiving a confirmation call a few days before your appointment from our automated call confirmation system.              Problem List              ICD-10-CM Priority Class Noted - Resolved    Morbid obesity (CMS-HCC) E66.01 Low  10/30/2013 - Present    DM type 2, uncontrolled, with renal complications (CMS-HCC) E11.29, E11.65 Medium  10/30/2013 - Present    Chronic pain G89.29 Low  10/30/2013 - Present    Iron deficiency anemia D50.9 Medium  10/30/2013 - Present    Acute on chronic diastolic CHF (congestive heart failure), NYHA class 1 (CMS-HCC) I50.33   11/20/2013 - Present    Major depressive disorder F32.9   11/20/2013 - Present    COPD (chronic obstructive pulmonary disease) (CMS-HCC) J44.9   11/20/2013 - Present    HTN (hypertension) I10   11/20/2013 - Present    Chronic kidney disease, stage 3 N18.3   1/6/2014 - Present    Chronic knee pain M25.569, G89.29   2/25/2014 - Present    Osteoarthritis M19.90   2/25/2014 - Present    Insomnia G47.00   2/25/2014 - Present    Hair loss L65.9   2/25/2014 - Present    Hypothyroidism E03.9   4/1/2014 - Present    Vitamin D deficiency E55.9   6/12/2014 - Present    Lumbar spondylosis with myelopathy M47.16   6/17/2014 - Present    Nocturnal hypoxia G47.34   7/21/2014 - Present    Pain of right heel M79.671   7/21/2014 - Present    Chronic pain syndrome G89.4   8/28/2014 - " Present    EDITH (obstructive sleep apnea) G47.33   1/27/2015 - Present    Hypertensive heart disease with heart failure (CMS-HCC) I11.0 High  4/27/2015 - Present    Chronic respiratory failure (CMS-HCC) J96.10   4/28/2015 - Present    Chronic constipation K59.09   7/7/2015 - Present    Pulmonary nodules R91.8   7/7/2015 - Present    Lumbosacral spondylosis without myelopathy M47.817   7/10/2015 - Present    Osteoarthritis, knee M17.9   9/2/2015 - Present    Osteoarthritis of spine with radiculopathy, lumbar region M47.26   10/2/2015 - Present    Bilateral hand pain M79.641, M79.642   12/21/2015 - Present    Primary osteoarthritis of both hands M19.041, M19.042   12/21/2015 - Present    Gastroesophageal reflux disease with esophagitis K21.0   12/22/2015 - Present    Myoclonic jerking G25.3   4/12/2016 - Present    Cough R05   8/18/2016 - Present    Other spondylosis with radiculopathy, lumbar region M47.26   9/16/2016 - Present    Tobacco abuse disorder Z72.0   9/27/2016 - Present    Toenail fungus B35.1   11/21/2016 - Present      Health Maintenance        Date Due Completion Dates    RETINAL SCREENING 9/26/1967 ---    IMM DTaP/Tdap/Td Vaccine (1 - Tdap) 9/26/1968 ---    IMM ZOSTER VACCINE 9/26/2009 ---    BONE DENSITY 9/26/2014 ---    MAMMOGRAM 2/24/2016 2/24/2015, 2/24/2015, 2/24/2015, 2/24/2015, 2/24/2015, 2/17/2015    COLON CANCER SCREENING ANNUAL FIT 12/28/2016 12/28/2015, 3/7/2015    DIABETES MONOFILAMENT / LE EXAM 6/7/2017 6/7/2016    A1C SCREENING 7/26/2017 1/26/2017, 5/27/2016, 12/17/2015, 8/11/2015, 2/24/2015, 6/2/2014, 8/31/2012    FASTING LIPID PROFILE 1/26/2018 1/26/2017, 12/17/2015, 2/24/2015, 6/2/2014, 8/31/2012    URINE ACR / MICROALBUMIN 1/26/2018 1/26/2017, 12/17/2015, 2/24/2015, 8/31/2012    SERUM CREATININE 1/26/2018 1/26/2017, 5/27/2016, 12/17/2015, 8/11/2015, 5/8/2015, 3/19/2015, 2/27/2015, 2/24/2015, 6/2/2014, 1/13/2014, 11/22/2013, 11/21/2013, 11/20/2013, 11/19/2013, 11/14/2013, 11/13/2013,  11/12/2013, 11/11/2013, 11/10/2013, 11/9/2013, 11/8/2013, 11/7/2013, 11/6/2013, 11/5/2013, 11/4/2013, 11/3/2013, 11/2/2013, 11/1/2013, 10/31/2013, 10/30/2013, 10/29/2013, 8/31/2012            Current Immunizations     13-VALENT PCV PREVNAR 9/28/2016    Influenza TIV (IM) 9/27/2016, 11/11/2013 11:45 AM    Influenza Vaccine Adult HD 10/20/2015    Influenza Vaccine Quad Inj (Pf) 10/22/2013    Influenza Vaccine Quad Inj (Preserved) 9/12/2012    Pneumococcal Vaccine (UF)Historical Data 9/1/2012    Pneumococcal polysaccharide vaccine (PPSV-23) 12/11/2014, 10/22/2013      Below and/or attached are the medications your provider expects you to take. Review all of your home medications and newly ordered medications with your provider and/or pharmacist. Follow medication instructions as directed by your provider and/or pharmacist. Please keep your medication list with you and share with your provider. Update the information when medications are discontinued, doses are changed, or new medications (including over-the-counter products) are added; and carry medication information at all times in the event of emergency situations     Allergies:  KEFLEX - Rash     CODEINE - Nausea     FOOD - Diarrhea     LYRICA - Nausea     SUDAFED - Nausea,Unspecified               Medications  Valid as of: February 08, 2017 -  1:31 PM    Generic Name Brand Name Tablet Size Instructions for use    Albuterol Sulfate (Aero Soln) albuterol 108 (90 BASE) MCG/ACT Inhale 2 Puffs by mouth every 6 hours as needed for Shortness of Breath.        Albuterol Sulfate (Nebu Soln) PROVENTIL 2.5mg/3ml 3 mL by Nebulization route every four hours as needed for Shortness of Breath.        Cyclobenzaprine HCl (Tab) FLEXERIL 10 MG TAKE 1 TABLET BY MOUTH THREE TIMES DAILY AS NEEDED FOR MILD PAIN        Escitalopram Oxalate (Tab) LEXAPRO 10 MG Take 1 Tab by mouth every day.        Fluticasone-Salmeterol (AEROSOL POWDER, BREATH ACTIVATED) ADVAIR 500-50 MCG/DOSE Inhale 1  Puff by mouth every 12 hours.        Gabapentin (Tab) NEURONTIN 600 MG Take 1 Tab by mouth 3 times a day.        Glucose Blood (Strip) FREESTYLE LITE  USE TO TEST BLOOD SUGAR TWICE DAILY OR AS DIRECTED        Influenza Vac Types A & B PF (Suspension Prefilled Syringe) FLUVIRIN PRESERVATIVE FREE 0.5 ML 0.5 mL by Intramuscular route Once.        Insulin Glargine (Solution Pen-injector) LANTUS SOLOSTAR 100 UNIT/ML INJECT 5 TO 20 UNITS EVERY EVENING        Insulin Pen Needle (Misc) B-D UF III MINI PEN NEEDLES 31G X 5 MM USE TO INJECT INSULIN DAILY        Lactulose (Solution) lactulose 10 GM/15ML Take 30 mL by mouth 2 times a day as needed (constipation).        Levothyroxine Sodium (Tab) SYNTHROID 75 MCG TAKE 1 TABLET BY MOUTH EVERY DAY        Lidocaine (Ointment) XYLOCAINE 5 % Apply  to affected area(s) as needed.        lidocaine (URO-JET) 2 % Gel (Gel) URO-JET 2 % Apply to affected area once daily as needed for pain relief.        Lisinopril (Tab) PRINIVIL, ZESTRIL 40 MG Take 1 Tab by mouth every day.        Misc. Devices (Misc) Misc. Devices  Freestyle lite test strips; patient has DM type 2 Ell.65 with hyperglycemia and tests 3 times per day        Misc. Devices (Misc) Misc. Devices  Life alert system.        Montelukast Sodium (Tab) SINGULAIR 10 MG Take 1 Tab by mouth every day.        Multiple Vitamins-Minerals   Take  by mouth every day.        Non Formulary Request Non Formulary Request  Antioxidants        Omeprazole (CAPSULE DELAYED RELEASE) PRILOSEC 20 MG Take 1 Cap by mouth every day.        OxyCODONE HCl (Tab) ROXICODONE 10 MG Take 1-2 Tabs by mouth every 6 hours as needed for Moderate Pain (back pain and knee pain).        Pneumococcal 13-Lorrie Conj Vacc (Suspension) PREVNAR 13  0.5 mL by Intramuscular route Once PRN for up to 1 dose.        Simvastatin (Tab) ZOCOR 10 MG TAKE 1 TABLET BY MOUTH EVERY EVENING        TraZODone HCl (Tab) DESYREL 100 MG TAKE 3 TABLETS BY MOUTH NIGHTLY AT BEDTIME AS NEEDED FOR  SLEEP        .                 Medicines prescribed today were sent to:     [facility] [place], [place] - [address]    [address] [place] [address]    Phone: [phone] Fax: [phone]    Open 24 Hours?: No      Medication refill instructions:       If your prescription bottle indicates you have medication refills left, it is not necessary to call your provider’s office. Please contact your pharmacy and they will refill your medication.    If your prescription bottle indicates you do not have any refills left, you may request refills at any time through one of the following ways: The online [employer] system (except Urgent Care), by calling your provider’s office, or by asking your pharmacy to contact your provider’s office with a refill request. Medication refills are processed only during regular business hours and may not be available until the next business day. Your provider may request additional information or to have a follow-up visit with you prior to refilling your medication.   *Please Note: Medication refills are assigned a new Rx number when refilled electronically. Your pharmacy may indicate that no refills were authorized even though a new prescription for the same medication is available at the pharmacy. Please request the medicine by name with the pharmacy before contacting your provider for a refill.        Your To Do List     Future Labs/Procedures Complete By Expires    CBC WITH DIFFERENTIAL  As directed 2/8/2018    FERRITIN  As directed 2/8/2018    HEMOGLOBIN A1C  As directed 2/8/2018    IRON/TOTAL IRON BIND  As directed 2/8/2018    MICROALBUMIN CREAT RATIO URINE  As directed 8/10/2017    OCCULT BLOOD FECES IMMUNOASSAY  As directed 2/8/2018    TSH  As directed 2/8/2018    VITAMIN D,25 HYDROXY  As directed 2/8/2018      Instructions    1. Increase your gabapentin to 1200mg twice per day.    2. You can take up to 1000mg of acetaminophen (tylenol) three times per day if needed.    3. Have  labs checked 3 months. Have labs checked after 4/26/17       Other Notes About Your Plan     On pain contract with Dr. Lawson  Last UDS: 3/26/2015 Dr Lawson  Contolled Substance agreement signed: 8/28/2014    Comprehensive Medication Review: appt 11.18.2014 in [place].                 [facility]: Patient Declined

## 2017-02-08 NOTE — PROGRESS NOTES
Chief Complaint   Patient presents with   • Results     fv labs       HISTORY OF PRESENT ILLNESS: Patient is a 66 y.o. female established patient who presents today to be seen for acute and chronic issues.    DM type 2, uncontrolled, with renal complications  Patient has type 2 diabetes and recently had mild worsening of control of disease. Previous hemoglobin A1c was 7%, most recent was 7.5%. She takes 20 units of insulin a day. She has noted some improvement recently with sugars in the low 100s in the morning. She does continue on ACE inhibitor and statin. We discussed continuing to monitor.    Iron deficiency anemia  Patient has a history of iron deficiency anemia but last labs showed normal CBC and iron levels. She had a negative fit testing year ago. We discussed having her do another fit test and rechecking her labs.    COPD (chronic obstructive pulmonary disease)  Patient has COPD and does continue on inhalers. She is on oxygen at night.    Tobacco abuse disorder  Patient quit smoking 8 days ago. I congratulated her on this change.    Vitamin D deficiency  This is a chronic condition which is well controlled on medications. Patient is tolerating medications without side effects.    Chronic respiratory failure  Patient has chronic respiratory failure. She wonders if she still needs to wear oxygen at night as she recently quit smoking. I discussed with her that she does need to continue to wear oxygen.    Lumbosacral spondylosis without myelopathy  Patient has lumbar stenosis and has followed with Dr. Arteaga for injections and myself for pain management. Recently pain has worsened and we discussed increasing her gabapentin to 1200 mg twice per day. She cannot take anti-inflammatories due to kidney disease. She is not currently taking any Tylenol and I let her know she take 1000 mg up to 3 times a day.  Chronic pain recheck:   Last dose of controlled substance: this am  Chronic pain treated with oxycodone  10-20mg taken 2-3 times a day    She  reports that she does not drink alcohol.  She  reports that she does not use illicit drugs.    Consequences of Chronic Opiate therapy:  (5 A's)  Analgesia: Compared to no treatment or prior treatment, pain is currently not changed  Activity: not changed  Adverse Events: She denies constipation  Aberrant Behaviors: She reports she is taking medication as prescribed and is not veering from agreed treatment regimen. There have been no inappropriate refills or lost/stolen meds reported.   Affect/Mood: Pain is not impacting patient's mood.  Patient denies depression/anxiety.    Nonnarcotic treatments that are being used: muscle relaxers and Gabapentin. Treatment goals discussed.    Last order of CONTROLLED SUBSTANCE TREATMENT AGREEMENT was found on 6/7/2016 from Office Visit on 6/7/2016     UDS Summary     Patient has no health maintenance due at this time        Most recent UDS reviewed today and is consistent with prescribed medications.    Pain management agreement initiated/updated and signed on: 6/16  Urine drug screen done: 6/16, which was reviewed today and is consistent with prescribed medications.    I have reviewed the medical records, the Prescription Monitoring Program and I have determined that controlled substance treatment is medically indicated.          Patient Active Problem List    Diagnosis Date Noted   • Hypertensive heart disease with heart failure (CMS-[facility]) 04/27/2015     Priority: High   • DM type 2, uncontrolled, with renal complications (CMS-[facility]) 10/30/2013     Priority: Medium   • Iron deficiency anemia 10/30/2013     Priority: Medium   • Morbid obesity (CMS-HCC) 10/30/2013     Priority: Low   • Chronic pain 10/30/2013     Priority: Low   • Toenail fungus 11/21/2016   • Tobacco abuse disorder 09/27/2016   • Other spondylosis with radiculopathy, lumbar region 09/16/2016   • Cough 08/18/2016   • Myoclonic jerking 04/12/2016   • Gastroesophageal reflux disease  with esophagitis 12/22/2015   • Bilateral hand pain 12/21/2015   • Primary osteoarthritis of both hands 12/21/2015   • Osteoarthritis of spine with radiculopathy, lumbar region 10/02/2015   • Osteoarthritis, knee 09/02/2015   • Lumbosacral spondylosis without myelopathy 07/10/2015   • Chronic constipation 07/07/2015   • Pulmonary nodules 07/07/2015   • Chronic respiratory failure (CMS-HCC) 04/28/2015   • EDITH (obstructive sleep apnea) 01/27/2015   • Chronic pain syndrome 08/28/2014   • Nocturnal hypoxia 07/21/2014   • Pain of right heel 07/21/2014   • Lumbar spondylosis with myelopathy 06/17/2014   • Vitamin D deficiency 06/12/2014   • Hypothyroidism 04/01/2014   • Chronic knee pain 02/25/2014   • Osteoarthritis 02/25/2014   • Insomnia 02/25/2014   • Hair loss 02/25/2014   • Chronic kidney disease, stage 3 01/06/2014   • Acute on chronic diastolic CHF (congestive heart failure), NYHA class 1 (CMS-HCC) 11/20/2013   • Major depressive disorder 11/20/2013   • COPD (chronic obstructive pulmonary disease) (CMS-HCC) 11/20/2013   • HTN (hypertension) 11/20/2013       Allergies:Keflex; Codeine; Food; Lyrica; and Sudafed    Current Outpatient Prescriptions Ordered in [facility]   Medication Sig Dispense Refill   • oxycodone immediate release (ROXICODONE) 10 MG immediate release tablet Take 1-2 Tabs by mouth every 6 hours as needed for Moderate Pain (back pain and knee pain). 168 Tab 0   • fluticasone-salmeterol (ADVAIR) 500-50 MCG/DOSE AEROSOL POWDER, BREATH ACTIVATED Inhale 1 Puff by mouth every 12 hours. 1 Inhaler 5   • omeprazole (PRILOSEC) 20 MG delayed-release capsule Take 1 Cap by mouth every day. 90 Cap 1   • lidocaine (URO-JET) 2 % Gel Apply to affected area once daily as needed for pain relief. 1 Bottle 0   • B-D UF III MINI PEN NEEDLES 31G X 5 MM Misc USE TO INJECT INSULIN DAILY 100 Each 1   • lisinopril (PRINIVIL, ZESTRIL) 40 MG tablet Take 1 Tab by mouth every day. 90 Tab 3   • FLUVIRIN PRESERVATIVE FREE 0.5 ML  Suspension Prefilled Syringe 0.5 mL by Intramuscular route Once.     • trazodone (DESYREL) 100 MG Tab TAKE 3 TABLETS BY MOUTH NIGHTLY AT BEDTIME AS NEEDED FOR SLEEP 90 Tab 3   • LANTUS SOLOSTAR 100 UNIT/ML Solution Pen-injector injection INJECT 5 TO 20 UNITS EVERY EVENING 100 mL 1   • Multiple Vitamins-Minerals (HAIR/SKIN/NAILS PO) Take  by mouth every day.     • cyclobenzaprine (FLEXERIL) 10 MG Tab TAKE 1 TABLET BY MOUTH THREE TIMES DAILY AS NEEDED FOR MILD PAIN 90 Tab 11   • lactulose 10 GM/15ML Solution Take 30 mL by mouth 2 times a day as needed (constipation). 500 mL 3   • simvastatin (ZOCOR) 10 MG Tab TAKE 1 TABLET BY MOUTH EVERY EVENING 90 Tab 3   • Misc. Devices Misc Life alert system. 1 Device 0   • Misc. Devices Misc Freestyle lite test strips; patient has DM type 2 Ell.65 with hyperglycemia and tests 3 times per day 300 Strip 3   • albuterol (PROVENTIL) 2.5mg/3ml Nebu Soln solution for nebulization 3 mL by Nebulization route every four hours as needed for Shortness of Breath. 75 mL 3   • escitalopram (LEXAPRO) 10 MG Tab Take 1 Tab by mouth every day. 90 Tab 3   • albuterol (VENTOLIN OR PROVENTIL) 108 (90 BASE) MCG/ACT Aero Soln inhalation aerosol Inhale 2 Puffs by mouth every 6 hours as needed for Shortness of Breath. 8.5 g 3   • gabapentin (NEURONTIN) 600 MG tablet Take 1 Tab by mouth 3 times a day. 270 Tab 3   • montelukast (SINGULAIR) 10 MG Tab Take 1 Tab by mouth every day. 90 Tab 3   • levothyroxine (SYNTHROID) 75 MCG TABS TAKE 1 TABLET BY MOUTH EVERY DAY 90 Tab 3   • Non Formulary Request Antioxidants     • FREESTYLE LITE strip USE TO TEST BLOOD SUGAR TWICE DAILY OR AS DIRECTED 100 Strip 3   • pneumococcal 13-Lorrie Conj Vacc (PREVNAR 13) syringe 0.5 mL by Intramuscular route Once PRN for up to 1 dose. 0.5 Each 0   • lidocaine (XYLOCAINE) 5 % OINT Apply  to affected area(s) as needed.       No current Epic-ordered facility-administered medications on file.       Past Medical History   Diagnosis Date  "  • COPD    • ASTHMA    • Hypertension    • Pneumonia      as a child   • Bronchitis      as a child   • Unspecified urinary incontinence    • Fall    • Infectious disease      Hepatitis C   • Arthritis      \"everywhere\"   • Fibromyalgia    • Neuropathy (CMS-HCC)      bilat feet   • Other specified symptom associated with female genital organs      Hysterectomy at age 22yrs   • Diabetes      Type 2   • Hepatitis C      \"I have markers in blood but not active\"   • Congestive heart failure (CMS-HCC) 2013     related to pulmonary failure   • Indigestion    • GERD (gastroesophageal reflux disease)    • Breath shortness      \"only with flare up with allergies\"   • Sleep apnea      does not wear CPAP, \"can't do it\"   • Disorder of thyroid      Hypothyroid   • Backpain      chronic back pain   • Heart burn    • Pain 2016     \"pain everywhere\"   • Psychiatric problem 2016     PTSD       Social History   Substance Use Topics   • Smoking status: Former Smoker -- 1.00 packs/day for 50 years     Types: Cigarettes     Quit date: 2017   • Smokeless tobacco: Never Used   • Alcohol Use: No       Family Status   Relation Status Death Age   • Mother     • Father     • Brother Alive    • Maternal Grandmother       Family History   Problem Relation Age of Onset   • Lung Disease Mother    • Alcohol/Drug Mother    • Heart Disease Mother    • Cancer Father    • Cancer Brother    • Diabetes Maternal Grandmother    • Stroke Paternal Grandmother        ROS:  Review of Systems   Constitutional: Negative for fever and malaise/fatigue.   HENT: Negative for congestion  Respiratory: Negative for cough  Cardiovascular: Negative for chest pain  Gastrointestinal: Negative for nausea, vomiting and abdominal pain.  Musculoskeletal: Positive for chronic back pain  All other systems reviewed and are negative except as in HPI.      Exam:  Blood pressure 122/80, pulse 78, temperature 36.7 °C (98 °F), resp. rate 16, " "height 1.626 m (5' 4\"), weight 95.709 kg (211 lb), SpO2 90 %.  General: Obese female in NAD  Head is grossly normal.  Neck: Supple without JVD   Pulmonary: Clear to ausculation and percussion.  Normal effort. No rales, ronchi, or wheezing.  Cardiovascular: Regular rate and rhythm without murmur. Carotid and radial pulses are intact and equal bilaterally.  Extremities: no clubbing, cyanosis, or edema.    Hospital Outpatient Visit on 01/26/2017   Component Date Value Ref Range Status   • Glycohemoglobin 01/26/2017 7.5* 0.0 - 5.6 % Final    Comment: Increased risk for diabetes:  5.7 -6.4%  Diabetes:  >6.4%  Glycemic control for adults with diabetes:  <7.0%  The above interpretations are per ADA guidelines.  Diagnosis  of diabetes mellitus on the basis of elevated Hemoglobin A1c  should be confirmed by repeating the Hb A1c test.     • Est Avg Glucose 01/26/2017 169   Final    Comment: The eAG calculation is based on the A1c-Derived Daily Glucose  (ADAG) study.  See the ADA's website for additional information.     • Sodium 01/26/2017 137  135 - 145 mmol/L Final   • Potassium 01/26/2017 4.5  3.6 - 5.5 mmol/L Final   • Chloride 01/26/2017 104  96 - 112 mmol/L Final   • Co2 01/26/2017 27  20 - 33 mmol/L Final   • Anion Gap 01/26/2017 6.0  0.0 - 11.9 Final   • Glucose 01/26/2017 119* 65 - 99 mg/dL Final   • Bun 01/26/2017 22  8 - 22 mg/dL Final   • Creatinine 01/26/2017 1.32  0.50 - 1.40 mg/dL Final   • Calcium 01/26/2017 9.1  8.5 - 10.5 mg/dL Final   • AST(SGOT) 01/26/2017 13  12 - 45 U/L Final   • ALT(SGPT) 01/26/2017 9  2 - 50 U/L Final   • Alkaline Phosphatase 01/26/2017 70  30 - 99 U/L Final   • Total Bilirubin 01/26/2017 0.5  0.1 - 1.5 mg/dL Final   • Albumin 01/26/2017 3.7  3.2 - 4.9 g/dL Final   • Total Protein 01/26/2017 6.9  6.0 - 8.2 g/dL Final   • Globulin 01/26/2017 3.2  1.9 - 3.5 g/dL Final   • A-G Ratio 01/26/2017 1.2   Final   • Cholesterol,Tot 01/26/2017 134  100 - 199 mg/dL Final   • Triglycerides " 01/26/2017 72  0 - 149 mg/dL Final   • HDL 01/26/2017 39* >=40 mg/dL Final   • LDL 01/26/2017 81  <100 mg/dL Final   • Creatinine, Urine 01/26/2017 54.50   Final   • Microalbumin, Urine Random 01/26/2017 87.6   Final    Results obtained by dilution.   • Micro Alb Creat Ratio 01/26/2017 1607* 0 - 30 mg/g Final   • GFR If  01/26/2017 49* >60 mL/min/1.73 m 2 Final   • GFR If Non  01/26/2017 40* >60 mL/min/1.73 m 2 Final       Assessment/Plan:  1. Uncontrolled type 2 diabetes mellitus with stage 3 chronic kidney disease, with long-term current use of insulin (CMS-[facility])  MICROALBUMIN CREAT RATIO URINE    HEMOGLOBIN A1C    TSH    Uncontrolled, will continue on insulin   2. Iron deficiency anemia, unspecified iron deficiency anemia type  CBC WITH DIFFERENTIAL    IRON/TOTAL IRON BIND    FERRITIN    Controlled, will recheck labs   3. Chronic kidney disease, stage 3      Control, monitoring   4. Tobacco abuse disorder      Controlled, recently quit   5. Vitamin D deficiency  VITAMIN D,25 HYDROXY    Control, on vitamin D   6. Hypothyroidism, unspecified type      Controlled, on levothyroxine   7. Pulmonary emphysema, unspecified emphysema type (CMS-[facility])      Controlled, on inhalers   8. Chronic respiratory failure with hypoxia (CMS-[facility])      Control, encourage patient to continue on oxygen at night   9. Chronic knee pain, unspecified laterality  oxycodone immediate release (ROXICODONE) 10 MG immediate release tablet    DISCONTINUED: oxycodone immediate release (ROXICODONE) 10 MG immediate release tablet    DISCONTINUED: oxycodone immediate release (ROXICODONE) 10 MG immediate release tablet    Control, patient continues with Dr. Arteaga and myself.   10. Osteoarthritis of spine with radiculopathy, lumbar region  oxycodone immediate release (ROXICODONE) 10 MG immediate release tablet    DISCONTINUED: oxycodone immediate release (ROXICODONE) 10 MG immediate release tablet    DISCONTINUED:  oxycodone immediate release (ROXICODONE) 10 MG immediate release tablet   11. Lumbar spondylosis with myelopathy  oxycodone immediate release (ROXICODONE) 10 MG immediate release tablet    DISCONTINUED: oxycodone immediate release (ROXICODONE) 10 MG immediate release tablet    DISCONTINUED: oxycodone immediate release (ROXICODONE) 10 MG immediate release tablet    Control, follows the clinic and Dr. Arteaga   12. Encounter for long-term opiate analgesic use  oxycodone immediate release (ROXICODONE) 10 MG immediate release tablet    DISCONTINUED: oxycodone immediate release (ROXICODONE) 10 MG immediate release tablet    DISCONTINUED: oxycodone immediate release (ROXICODONE) 10 MG immediate release tablet   13. Chronic use of opiate drugs therapeutic purposes  oxycodone immediate release (ROXICODONE) 10 MG immediate release tablet    DISCONTINUED: oxycodone immediate release (ROXICODONE) 10 MG immediate release tablet    DISCONTINUED: oxycodone immediate release (ROXICODONE) 10 MG immediate release tablet   14. Screen for colon cancer  OCCULT BLOOD FECES IMMUNOASSAY   15. Lumbosacral spondylosis without myelopathy       Please note that this dictation was created using voice recognition software. I have made every reasonable attempt to correct obvious errors, but I expect that there are errors of grammar and possibly content that I did not discover before finalizing the note.

## 2017-02-08 NOTE — ASSESSMENT & PLAN NOTE
Patient has chronic respiratory failure. She wonders if she still needs to wear oxygen at night as she recently quit smoking. I discussed with her that she does need to continue to wear oxygen.

## 2017-02-08 NOTE — ASSESSMENT & PLAN NOTE
Patient has a history of iron deficiency anemia but last labs showed normal CBC and iron levels. She had a negative fit testing year ago. We discussed having her do another fit test and rechecking her labs.

## 2017-02-08 NOTE — PATIENT INSTRUCTIONS
1. Increase your gabapentin to 1200mg twice per day.    2. You can take up to 1000mg of acetaminophen (tylenol) three times per day if needed.    3. Have labs checked 3 months. Have labs checked after 4/26/17

## 2017-02-22 RX ORDER — LEVOTHYROXINE SODIUM 0.07 MG/1
TABLET ORAL
Qty: 90 TAB | Refills: 3 | Status: SHIPPED | OUTPATIENT
Start: 2017-02-22 | End: 2018-02-26 | Stop reason: SDUPTHER

## 2017-03-07 ENCOUNTER — HOSPITAL ENCOUNTER (INPATIENT)
Facility: MEDICAL CENTER | Age: 67
LOS: 10 days | DRG: 871 | End: 2017-03-17
Attending: EMERGENCY MEDICINE | Admitting: INTERNAL MEDICINE
Payer: MEDICARE

## 2017-03-07 ENCOUNTER — APPOINTMENT (OUTPATIENT)
Dept: RADIOLOGY | Facility: MEDICAL CENTER | Age: 67
DRG: 871 | End: 2017-03-07
Attending: INTERNAL MEDICINE
Payer: MEDICARE

## 2017-03-07 ENCOUNTER — RESOLUTE PROFESSIONAL BILLING HOSPITAL PROF FEE (OUTPATIENT)
Dept: HOSPITALIST | Facility: MEDICAL CENTER | Age: 67
End: 2017-03-07
Payer: MEDICARE

## 2017-03-07 ENCOUNTER — APPOINTMENT (OUTPATIENT)
Dept: RADIOLOGY | Facility: MEDICAL CENTER | Age: 67
DRG: 871 | End: 2017-03-07
Attending: EMERGENCY MEDICINE
Payer: MEDICARE

## 2017-03-07 DIAGNOSIS — E87.5 HYPERKALEMIA: ICD-10-CM

## 2017-03-07 DIAGNOSIS — R09.02 HYPOXIA: ICD-10-CM

## 2017-03-07 DIAGNOSIS — G47.33 OSA (OBSTRUCTIVE SLEEP APNEA): ICD-10-CM

## 2017-03-07 DIAGNOSIS — A41.9 SEPSIS, DUE TO UNSPECIFIED ORGANISM: ICD-10-CM

## 2017-03-07 DIAGNOSIS — J43.9 PULMONARY EMPHYSEMA, UNSPECIFIED EMPHYSEMA TYPE (HCC): ICD-10-CM

## 2017-03-07 DIAGNOSIS — I95.89 OTHER SPECIFIED HYPOTENSION: ICD-10-CM

## 2017-03-07 DIAGNOSIS — N19 RENAL FAILURE: ICD-10-CM

## 2017-03-07 DIAGNOSIS — E66.01 MORBID OBESITY DUE TO EXCESS CALORIES (HCC): ICD-10-CM

## 2017-03-07 PROBLEM — R65.21 SEPTIC SHOCK (HCC): Status: ACTIVE | Noted: 2017-03-07

## 2017-03-07 PROBLEM — J18.9 CAP (COMMUNITY ACQUIRED PNEUMONIA): Status: ACTIVE | Noted: 2017-03-07

## 2017-03-07 LAB
ALBUMIN SERPL BCP-MCNC: 2.8 G/DL (ref 3.2–4.9)
ALBUMIN/GLOB SERPL: 1 G/DL
ALP SERPL-CCNC: 63 U/L (ref 30–99)
ALT SERPL-CCNC: 10 U/L (ref 2–50)
ANION GAP SERPL CALC-SCNC: 7 MMOL/L (ref 0–11.9)
ANISOCYTOSIS BLD QL SMEAR: ABNORMAL
APPEARANCE UR: ABNORMAL
APTT PPP: 37.1 SEC (ref 24.7–36)
AST SERPL-CCNC: 17 U/L (ref 12–45)
BACTERIA #/AREA URNS HPF: ABNORMAL /HPF
BASOPHILS # BLD AUTO: 0 % (ref 0–1.8)
BASOPHILS # BLD: 0 K/UL (ref 0–0.12)
BILIRUB SERPL-MCNC: 0.6 MG/DL (ref 0.1–1.5)
BILIRUB UR QL STRIP.AUTO: NEGATIVE
BUN SERPL-MCNC: 52 MG/DL (ref 8–22)
CALCIUM SERPL-MCNC: 8.4 MG/DL (ref 8.5–10.5)
CHLORIDE SERPL-SCNC: 104 MMOL/L (ref 96–112)
CO2 SERPL-SCNC: 22 MMOL/L (ref 20–33)
COLOR UR: ABNORMAL
CREAT SERPL-MCNC: 1.89 MG/DL (ref 0.5–1.4)
EKG IMPRESSION: NORMAL
EOSINOPHIL # BLD AUTO: 0 K/UL (ref 0–0.51)
EOSINOPHIL NFR BLD: 0 % (ref 0–6.9)
EPI CELLS #/AREA URNS HPF: ABNORMAL /HPF
ERYTHROCYTE [DISTWIDTH] IN BLOOD BY AUTOMATED COUNT: 55.1 FL (ref 35.9–50)
GFR SERPL CREATININE-BSD FRML MDRD: 26 ML/MIN/1.73 M 2
GLOBULIN SER CALC-MCNC: 2.9 G/DL (ref 1.9–3.5)
GLUCOSE SERPL-MCNC: 239 MG/DL (ref 65–99)
GLUCOSE UR STRIP.AUTO-MCNC: NEGATIVE MG/DL
HCT VFR BLD AUTO: 29.4 % (ref 37–47)
HGB BLD-MCNC: 8.7 G/DL (ref 12–16)
INR PPP: 1.22 (ref 0.87–1.13)
KETONES UR STRIP.AUTO-MCNC: NEGATIVE MG/DL
LACTATE BLD-SCNC: 2.3 MMOL/L (ref 0.5–2)
LEUKOCYTE ESTERASE UR QL STRIP.AUTO: NEGATIVE
LYMPHOCYTES # BLD AUTO: 0.58 K/UL (ref 1–4.8)
LYMPHOCYTES NFR BLD: 3.5 % (ref 22–41)
MAGNESIUM SERPL-MCNC: 1.9 MG/DL (ref 1.5–2.5)
MANUAL DIFF BLD: ABNORMAL
MCH RBC QN AUTO: 25.4 PG (ref 27–33)
MCHC RBC AUTO-ENTMCNC: 29.6 G/DL (ref 33.6–35)
MCV RBC AUTO: 85.7 FL (ref 81.4–97.8)
MICRO URNS: ABNORMAL
MICROCYTES BLD QL SMEAR: ABNORMAL
MONOCYTES # BLD AUTO: 0.13 K/UL (ref 0–0.85)
MONOCYTES NFR BLD AUTO: 0.8 % (ref 0–13.4)
MORPHOLOGY BLD-IMP: NORMAL
MYELOCYTES NFR BLD MANUAL: 0.9 %
NEUTROPHILS # BLD AUTO: 15.74 K/UL (ref 2–7.15)
NEUTROPHILS NFR BLD: 82.6 % (ref 44–72)
NEUTS BAND NFR BLD MANUAL: 12.2 % (ref 0–10)
NITRITE UR QL STRIP.AUTO: NEGATIVE
NRBC # BLD AUTO: 0.02 K/UL
NRBC BLD AUTO-RTO: 0.1 /100 WBC
OVALOCYTES BLD QL SMEAR: NORMAL
PH UR STRIP.AUTO: 5 [PH]
PLATELET # BLD AUTO: 221 K/UL (ref 164–446)
PLATELET BLD QL SMEAR: NORMAL
PMV BLD AUTO: 9.4 FL (ref 9–12.9)
POIKILOCYTOSIS BLD QL SMEAR: NORMAL
POTASSIUM SERPL-SCNC: 6.3 MMOL/L (ref 3.6–5.5)
PROT SERPL-MCNC: 5.7 G/DL (ref 6–8.2)
PROT UR QL STRIP: 30 MG/DL
PROTHROMBIN TIME: 15.8 SEC (ref 12–14.6)
RBC # BLD AUTO: 3.43 M/UL (ref 4.2–5.4)
RBC # URNS HPF: ABNORMAL /HPF
RBC BLD AUTO: PRESENT
RBC UR QL AUTO: ABNORMAL
SODIUM SERPL-SCNC: 133 MMOL/L (ref 135–145)
SP GR UR STRIP.AUTO: 1.01
TSH SERPL DL<=0.005 MIU/L-ACNC: 1.48 UIU/ML (ref 0.3–3.7)
WBC # BLD AUTO: 16.6 K/UL (ref 4.8–10.8)
WBC #/AREA URNS HPF: ABNORMAL /HPF

## 2017-03-07 PROCEDURE — 96375 TX/PRO/DX INJ NEW DRUG ADDON: CPT

## 2017-03-07 PROCEDURE — 71010 DX-CHEST-PORTABLE (1 VIEW): CPT

## 2017-03-07 PROCEDURE — 85007 BL SMEAR W/DIFF WBC COUNT: CPT

## 2017-03-07 PROCEDURE — 93005 ELECTROCARDIOGRAM TRACING: CPT | Performed by: EMERGENCY MEDICINE

## 2017-03-07 PROCEDURE — 700102 HCHG RX REV CODE 250 W/ 637 OVERRIDE(OP): Performed by: PHARMACIST

## 2017-03-07 PROCEDURE — A9270 NON-COVERED ITEM OR SERVICE: HCPCS | Performed by: EMERGENCY MEDICINE

## 2017-03-07 PROCEDURE — 700105 HCHG RX REV CODE 258: Performed by: INTERNAL MEDICINE

## 2017-03-07 PROCEDURE — 80053 COMPREHEN METABOLIC PANEL: CPT

## 2017-03-07 PROCEDURE — 83735 ASSAY OF MAGNESIUM: CPT

## 2017-03-07 PROCEDURE — 99223 1ST HOSP IP/OBS HIGH 75: CPT | Performed by: INTERNAL MEDICINE

## 2017-03-07 PROCEDURE — 02HV33Z INSERTION OF INFUSION DEVICE INTO SUPERIOR VENA CAVA, PERCUTANEOUS APPROACH: ICD-10-PCS | Performed by: INTERNAL MEDICINE

## 2017-03-07 PROCEDURE — 36556 INSERT NON-TUNNEL CV CATH: CPT | Performed by: INTERNAL MEDICINE

## 2017-03-07 PROCEDURE — 700101 HCHG RX REV CODE 250: Performed by: INTERNAL MEDICINE

## 2017-03-07 PROCEDURE — 81001 URINALYSIS AUTO W/SCOPE: CPT

## 2017-03-07 PROCEDURE — 83605 ASSAY OF LACTIC ACID: CPT

## 2017-03-07 PROCEDURE — 87040 BLOOD CULTURE FOR BACTERIA: CPT

## 2017-03-07 PROCEDURE — 96366 THER/PROPH/DIAG IV INF ADDON: CPT

## 2017-03-07 PROCEDURE — 700102 HCHG RX REV CODE 250 W/ 637 OVERRIDE(OP): Performed by: EMERGENCY MEDICINE

## 2017-03-07 PROCEDURE — 700102 HCHG RX REV CODE 250 W/ 637 OVERRIDE(OP): Performed by: INTERNAL MEDICINE

## 2017-03-07 PROCEDURE — 770022 HCHG ROOM/CARE - ICU (200)

## 2017-03-07 PROCEDURE — 96368 THER/DIAG CONCURRENT INF: CPT

## 2017-03-07 PROCEDURE — 85027 COMPLETE CBC AUTOMATED: CPT

## 2017-03-07 PROCEDURE — 700111 HCHG RX REV CODE 636 W/ 250 OVERRIDE (IP): Performed by: INTERNAL MEDICINE

## 2017-03-07 PROCEDURE — 700105 HCHG RX REV CODE 258: Performed by: EMERGENCY MEDICINE

## 2017-03-07 PROCEDURE — 84443 ASSAY THYROID STIM HORMONE: CPT

## 2017-03-07 PROCEDURE — 36556 INSERT NON-TUNNEL CV CATH: CPT

## 2017-03-07 PROCEDURE — B543ZZZ ULTRASONOGRAPHY OF RIGHT JUGULAR VEINS: ICD-10-PCS | Performed by: INTERNAL MEDICINE

## 2017-03-07 PROCEDURE — 85730 THROMBOPLASTIN TIME PARTIAL: CPT

## 2017-03-07 PROCEDURE — 304562 HCHG STAT O2 MASK/CANNULA

## 2017-03-07 PROCEDURE — 99291 CRITICAL CARE FIRST HOUR: CPT

## 2017-03-07 PROCEDURE — 96365 THER/PROPH/DIAG IV INF INIT: CPT

## 2017-03-07 PROCEDURE — 96367 TX/PROPH/DG ADDL SEQ IV INF: CPT

## 2017-03-07 PROCEDURE — 700101 HCHG RX REV CODE 250: Performed by: EMERGENCY MEDICINE

## 2017-03-07 PROCEDURE — A9270 NON-COVERED ITEM OR SERVICE: HCPCS | Performed by: PHARMACIST

## 2017-03-07 PROCEDURE — 99291 CRITICAL CARE FIRST HOUR: CPT | Mod: 25 | Performed by: INTERNAL MEDICINE

## 2017-03-07 PROCEDURE — 87077 CULTURE AEROBIC IDENTIFY: CPT

## 2017-03-07 PROCEDURE — C1751 CATH, INF, PER/CENT/MIDLINE: HCPCS

## 2017-03-07 PROCEDURE — 83036 HEMOGLOBIN GLYCOSYLATED A1C: CPT

## 2017-03-07 PROCEDURE — 85610 PROTHROMBIN TIME: CPT

## 2017-03-07 PROCEDURE — 94760 N-INVAS EAR/PLS OXIMETRY 1: CPT

## 2017-03-07 PROCEDURE — 87086 URINE CULTURE/COLONY COUNT: CPT

## 2017-03-07 PROCEDURE — C1751 CATH, INF, PER/CENT/MIDLINE: HCPCS | Performed by: EMERGENCY MEDICINE

## 2017-03-07 PROCEDURE — 700111 HCHG RX REV CODE 636 W/ 250 OVERRIDE (IP): Performed by: EMERGENCY MEDICINE

## 2017-03-07 RX ORDER — DULOXETIN HYDROCHLORIDE 60 MG/1
60 CAPSULE, DELAYED RELEASE ORAL DAILY
Status: ON HOLD | COMMUNITY
End: 2017-04-25

## 2017-03-07 RX ORDER — SODIUM CHLORIDE 9 MG/ML
INJECTION, SOLUTION INTRAVENOUS CONTINUOUS
Status: DISCONTINUED | OUTPATIENT
Start: 2017-03-07 | End: 2017-03-11

## 2017-03-07 RX ORDER — IPRATROPIUM BROMIDE AND ALBUTEROL SULFATE 2.5; .5 MG/3ML; MG/3ML
3 SOLUTION RESPIRATORY (INHALATION)
Status: DISCONTINUED | OUTPATIENT
Start: 2017-03-07 | End: 2017-03-11

## 2017-03-07 RX ORDER — ACETAMINOPHEN 325 MG/1
650 TABLET ORAL ONCE
Status: COMPLETED | OUTPATIENT
Start: 2017-03-07 | End: 2017-03-07

## 2017-03-07 RX ORDER — NICOTINE 21 MG/24HR
21 PATCH, TRANSDERMAL 24 HOURS TRANSDERMAL
Status: DISCONTINUED | OUTPATIENT
Start: 2017-03-08 | End: 2017-03-17 | Stop reason: HOSPADM

## 2017-03-07 RX ORDER — BISACODYL 10 MG
10 SUPPOSITORY, RECTAL RECTAL
Status: DISCONTINUED | OUTPATIENT
Start: 2017-03-07 | End: 2017-03-17 | Stop reason: HOSPADM

## 2017-03-07 RX ORDER — SODIUM CHLORIDE 9 MG/ML
30 INJECTION, SOLUTION INTRAVENOUS
Status: COMPLETED | OUTPATIENT
Start: 2017-03-07 | End: 2017-03-07

## 2017-03-07 RX ORDER — INSULIN GLARGINE 100 [IU]/ML
20 INJECTION, SOLUTION SUBCUTANEOUS NIGHTLY
Status: DISCONTINUED | OUTPATIENT
Start: 2017-03-07 | End: 2017-03-17 | Stop reason: HOSPADM

## 2017-03-07 RX ORDER — GUAIFENESIN 600 MG/1
600 TABLET, EXTENDED RELEASE ORAL EVERY 12 HOURS
Status: DISCONTINUED | OUTPATIENT
Start: 2017-03-07 | End: 2017-03-17 | Stop reason: HOSPADM

## 2017-03-07 RX ORDER — POLYETHYLENE GLYCOL 3350 17 G/17G
1 POWDER, FOR SOLUTION ORAL
Status: DISCONTINUED | OUTPATIENT
Start: 2017-03-07 | End: 2017-03-17 | Stop reason: HOSPADM

## 2017-03-07 RX ORDER — HEPARIN SODIUM 5000 [USP'U]/ML
5000 INJECTION, SOLUTION INTRAVENOUS; SUBCUTANEOUS EVERY 8 HOURS
Status: DISCONTINUED | OUTPATIENT
Start: 2017-03-08 | End: 2017-03-17 | Stop reason: HOSPADM

## 2017-03-07 RX ORDER — AMOXICILLIN 250 MG
2 CAPSULE ORAL 2 TIMES DAILY
Status: DISCONTINUED | OUTPATIENT
Start: 2017-03-07 | End: 2017-03-17 | Stop reason: HOSPADM

## 2017-03-07 RX ORDER — DULOXETIN HYDROCHLORIDE 30 MG/1
60 CAPSULE, DELAYED RELEASE ORAL DAILY
Status: DISCONTINUED | OUTPATIENT
Start: 2017-03-08 | End: 2017-03-17 | Stop reason: HOSPADM

## 2017-03-07 RX ORDER — OMEPRAZOLE 20 MG/1
20 CAPSULE, DELAYED RELEASE ORAL DAILY
Status: DISCONTINUED | OUTPATIENT
Start: 2017-03-08 | End: 2017-03-08

## 2017-03-07 RX ORDER — SODIUM CHLORIDE 9 MG/ML
1000 INJECTION, SOLUTION INTRAVENOUS
Status: DISCONTINUED | OUTPATIENT
Start: 2017-03-07 | End: 2017-03-07

## 2017-03-07 RX ORDER — ONDANSETRON 4 MG/1
4 TABLET, ORALLY DISINTEGRATING ORAL EVERY 4 HOURS PRN
Status: DISCONTINUED | OUTPATIENT
Start: 2017-03-07 | End: 2017-03-17 | Stop reason: HOSPADM

## 2017-03-07 RX ORDER — INSULIN GLARGINE 100 [IU]/ML
22 INJECTION, SOLUTION SUBCUTANEOUS NIGHTLY
COMMUNITY
End: 2017-08-29

## 2017-03-07 RX ORDER — SODIUM CHLORIDE 9 MG/ML
500 INJECTION, SOLUTION INTRAVENOUS
Status: DISCONTINUED | OUTPATIENT
Start: 2017-03-07 | End: 2017-03-17 | Stop reason: HOSPADM

## 2017-03-07 RX ORDER — SIMVASTATIN 20 MG
10 TABLET ORAL EVERY EVENING
Status: DISCONTINUED | OUTPATIENT
Start: 2017-03-07 | End: 2017-03-17 | Stop reason: HOSPADM

## 2017-03-07 RX ORDER — SODIUM POLYSTYRENE SULFONATE 15 G/60ML
15 SUSPENSION ORAL; RECTAL ONCE
Status: COMPLETED | OUTPATIENT
Start: 2017-03-07 | End: 2017-03-07

## 2017-03-07 RX ORDER — DEXTROSE MONOHYDRATE 25 G/50ML
50 INJECTION, SOLUTION INTRAVENOUS ONCE
Status: COMPLETED | OUTPATIENT
Start: 2017-03-07 | End: 2017-03-07

## 2017-03-07 RX ORDER — SODIUM CHLORIDE 9 MG/ML
30 INJECTION, SOLUTION INTRAVENOUS
Status: DISCONTINUED | OUTPATIENT
Start: 2017-03-07 | End: 2017-03-07

## 2017-03-07 RX ORDER — LEVOFLOXACIN 5 MG/ML
750 INJECTION, SOLUTION INTRAVENOUS ONCE
Status: COMPLETED | OUTPATIENT
Start: 2017-03-07 | End: 2017-03-07

## 2017-03-07 RX ORDER — SODIUM CHLORIDE 9 MG/ML
30 INJECTION, SOLUTION INTRAVENOUS ONCE
Status: COMPLETED | OUTPATIENT
Start: 2017-03-07 | End: 2017-03-07

## 2017-03-07 RX ORDER — LEVOTHYROXINE SODIUM 0.07 MG/1
75 TABLET ORAL
Status: DISCONTINUED | OUTPATIENT
Start: 2017-03-08 | End: 2017-03-17 | Stop reason: HOSPADM

## 2017-03-07 RX ORDER — SODIUM CHLORIDE 9 MG/ML
1000 INJECTION, SOLUTION INTRAVENOUS ONCE
Status: ACTIVE | OUTPATIENT
Start: 2017-03-07 | End: 2017-03-08

## 2017-03-07 RX ORDER — FAMOTIDINE 20 MG/1
20 TABLET, FILM COATED ORAL EVERY 24 HOURS
Status: DISCONTINUED | OUTPATIENT
Start: 2017-03-08 | End: 2017-03-09

## 2017-03-07 RX ORDER — ESCITALOPRAM OXALATE 10 MG/1
10 TABLET ORAL DAILY
Status: DISCONTINUED | OUTPATIENT
Start: 2017-03-08 | End: 2017-03-17 | Stop reason: HOSPADM

## 2017-03-07 RX ORDER — ONDANSETRON 2 MG/ML
4 INJECTION INTRAMUSCULAR; INTRAVENOUS EVERY 4 HOURS PRN
Status: DISCONTINUED | OUTPATIENT
Start: 2017-03-07 | End: 2017-03-17 | Stop reason: HOSPADM

## 2017-03-07 RX ORDER — MIDAZOLAM HYDROCHLORIDE 1 MG/ML
1 INJECTION INTRAMUSCULAR; INTRAVENOUS ONCE
Status: COMPLETED | OUTPATIENT
Start: 2017-03-07 | End: 2017-03-07

## 2017-03-07 RX ORDER — BUDESONIDE AND FORMOTEROL FUMARATE DIHYDRATE 160; 4.5 UG/1; UG/1
2 AEROSOL RESPIRATORY (INHALATION) 2 TIMES DAILY
Status: DISCONTINUED | OUTPATIENT
Start: 2017-03-07 | End: 2017-03-17 | Stop reason: HOSPADM

## 2017-03-07 RX ORDER — DEXTROSE MONOHYDRATE 25 G/50ML
25 INJECTION, SOLUTION INTRAVENOUS
Status: DISCONTINUED | OUTPATIENT
Start: 2017-03-07 | End: 2017-03-17 | Stop reason: HOSPADM

## 2017-03-07 RX ORDER — CALCIUM CHLORIDE 100 MG/ML
1 INJECTION INTRAVENOUS; INTRAVENTRICULAR ONCE
Status: DISCONTINUED | OUTPATIENT
Start: 2017-03-07 | End: 2017-03-07

## 2017-03-07 RX ADMIN — NOREPINEPHRINE BITARTRATE 10 MCG/MIN: 1 INJECTION INTRAVENOUS at 19:13

## 2017-03-07 RX ADMIN — AMPICILLIN SODIUM AND SULBACTAM SODIUM 3 G: 2; 1 INJECTION, POWDER, FOR SOLUTION INTRAMUSCULAR; INTRAVENOUS at 22:00

## 2017-03-07 RX ADMIN — AZITHROMYCIN 500 MG: 500 INJECTION, POWDER, LYOPHILIZED, FOR SOLUTION INTRAVENOUS at 22:15

## 2017-03-07 RX ADMIN — SODIUM POLYSTYRENE SULFONATE 15 G: 15 SUSPENSION ORAL; RECTAL at 18:47

## 2017-03-07 RX ADMIN — INSULIN HUMAN 10 UNITS: 100 INJECTION, SOLUTION PARENTERAL at 18:27

## 2017-03-07 RX ADMIN — SODIUM CHLORIDE 2790 ML: 9 INJECTION, SOLUTION INTRAVENOUS at 17:44

## 2017-03-07 RX ADMIN — SODIUM CHLORIDE 1000 ML: 9 INJECTION, SOLUTION INTRAVENOUS at 18:25

## 2017-03-07 RX ADMIN — NOREPINEPHRINE BITARTRATE 10 MCG/MIN: 1 INJECTION INTRAVENOUS at 22:00

## 2017-03-07 RX ADMIN — ACETAMINOPHEN 650 MG: 325 TABLET, FILM COATED ORAL at 17:49

## 2017-03-07 RX ADMIN — CALCIUM CHLORIDE 1 G: 100 INJECTION, SOLUTION INTRAVENOUS at 18:33

## 2017-03-07 RX ADMIN — DEXTROSE MONOHYDRATE 50 ML: 25 INJECTION, SOLUTION INTRAVENOUS at 18:29

## 2017-03-07 RX ADMIN — LEVOFLOXACIN 750 MG: 750 INJECTION, SOLUTION INTRAVENOUS at 17:49

## 2017-03-07 RX ADMIN — MIDAZOLAM 2 MG: 1 INJECTION INTRAMUSCULAR; INTRAVENOUS at 21:30

## 2017-03-07 ASSESSMENT — PAIN SCALES - GENERAL
PAINLEVEL_OUTOF10: 0
PAINLEVEL_OUTOF10: 4

## 2017-03-07 ASSESSMENT — LIFESTYLE VARIABLES: DO YOU DRINK ALCOHOL: NO

## 2017-03-07 NOTE — IP AVS SNAPSHOT
" Home Care Instructions                                                                                                                  Name:Priya Callahan  Medical Record Number:[id number]  CSN: [id number]    YOB: 1950   Age: 66 y.o.  Sex: female  HT:1.651 m (5' 5\") WT: 110.6 kg (243 lb 13.3 oz)          Admit Date: 3/7/2017     Discharge Date:   Today's Date: 3/17/2017  Attending Doctor:  Terence Macias M.D.                  Allergies:  Keflex; Codeine; Lyrica; and Sudafed            Discharge Instructions       Discharge Instructions    Discharged to home by car with friend. Discharged via wheelchair, hospital escort: Yes.  Special equipment needed: Oxygen    Be sure to schedule a follow-up appointment with your primary care doctor or any specialists as instructed.     Follow up with PULMONARY IN 2 WEEKS   Follow up with  Primary Care Provider IN 1 WEEK.   BMP ON 3/21/2017    Discharge Plan: Home with Home Health  Diet Plan: Discussed  Activity Level: Discussed  Smoking Cessation Offered: Patient Refused  Confirmed Follow up Appointment: Patient to Call and Schedule Appointment  Confirmed Symptoms Management: Discussed  Medication Reconciliation Updated: Yes  Influenza Vaccine Indication: Patient Refuses    I understand that a diet low in cholesterol, fat, and sodium is recommended for good health. Unless I have been given specific instructions below for another diet, I accept this instruction as my diet prescription.   Other diet: Diabetic    Special Instructions: None    · Is patient discharged on Warfarin / Coumadin?   No     · Is patient Post Blood Transfusion?  No    Chronic Obstructive Pulmonary Disease  Chronic obstructive pulmonary disease (COPD) is a common lung problem. In COPD, the flow of air from the lungs is limited. The way your lungs work will probably never return to normal, but there are things you can do to improve your lungs and make yourself feel better. Your doctor " may treat your condition with:  · Medicines.  · Oxygen.  · Lung surgery.  · Changes to your diet.  · Rehabilitation. This may involve a team of specialists.  HOME CARE  · Take all medicines as told by your doctor.  · Avoid medicines or cough syrups that dry up your airway (such as antihistamines) and do not allow you to get rid of thick spit. You do not need to avoid them if told differently by your doctor.  · If you smoke, stop. Smoking makes the problem worse.  · Avoid being around things that make your breathing worse (like smoke, chemicals, and fumes).  · Use oxygen therapy and therapy to help improve your lungs (pulmonary rehabilitation) if told by your doctor. If you need home oxygen therapy, ask your doctor if you should buy a tool to measure your oxygen level (oximeter).  · Avoid people who have a sickness you can catch (contagious).  · Avoid going outside when it is very hot, cold, or humid.  · Eat healthy foods. Eat smaller meals more often. Rest before meals.  · Stay active, but remember to also rest.  · Make sure to get all the shots (vaccines) your doctor recommends. Ask your doctor if you need a pneumonia shot.  · Learn and use tips on how to relax.  · Learn and use tips on how to control your breathing as told by your doctor. Try:  ¨ Breathing in (inhaling) through your nose for 1 second. Then, pucker your lips and breath out (exhale) through your lips for 2 seconds.  ¨ Putting one hand on your belly (abdomen). Breathe in slowly through your nose for 1 second. Your hand on your belly should move out. Pucker your lips and breathe out slowly through your lips. Your hand on your belly should move in as you breathe out.  · Learn and use controlled coughing to clear thick spit from your lungs. The steps are:  1. Lean your head a little forward.  2. Breathe in deeply.  3. Try to hold your breath for 3 seconds.  4. Keep your mouth slightly open while coughing 2 times.  5. Spit any thick spit out into a  tissue.  6. Rest and do the steps again 1 or 2 times as needed.  GET HELP IF:  · You cough up more thick spit than usual.  · There is a change in the color or thickness of the spit.  · It is harder to breathe than usual.  · Your breathing is faster than usual.  GET HELP RIGHT AWAY IF:  · You have shortness of breath while resting.  · You have shortness of breath that stops you from:  ¨ Being able to talk.  ¨ Doing normal activities.  · You chest hurts for longer than 5 minutes.  · Your skin color is more blue than usual.  · Your pulse oximeter shows that you have low oxygen for longer than 5 minutes.  MAKE SURE YOU:  · Understand these instructions.  · Will watch your condition.  · Will get help right away if you are not doing well or get worse.     This information is not intended to replace advice given to you by your health care provider. Make sure you discuss any questions you have with your health care provider.     Document Released: 06/05/2009 Document Revised: 01/08/2016 Document Reviewed: 08/14/2014  [employer] Interactive Patient Education ©2016 [employer]      Depression / Suicide Risk    As you are discharged from this [facility] facility, it is important to learn how to keep safe from harming yourself.    Recognize the warning signs:  · Abrupt changes in personality, positive or negative- including increase in energy   · Giving away possessions  · Change in eating patterns- significant weight changes-  positive or negative  · Change in sleeping patterns- unable to sleep or sleeping all the time   · Unwillingness or inability to communicate  · Depression  · Unusual sadness, discouragement and loneliness  · Talk of wanting to die  · Neglect of personal appearance   · Rebelliousness- reckless behavior  · Withdrawal from people/activities they love  · Confusion- inability to concentrate     If you or a loved one observes any of these behaviors or has concerns about self-harm, here's what you can  do:  · Talk about it- your feelings and reasons for harming yourself  · Remove any means that you might use to hurt yourself (examples: pills, rope, extension cords, firearm)  · Get professional help from the community (Mental Health, Substance Abuse, psychological counseling)  · Do not be alone:Call your Safe Contact- someone whom you trust who will be there for you.  · Call your local CRISIS HOTLINE 252-9655 or 691-917-5254  · Call your local Children's Mobile Crisis Response Team [place] (053) 123-9952 or [employer]  · Call the toll free National Suicide Prevention Hotlines   · National Suicide Prevention Lifeline 026-480-AFNS (9839)  · National Hope Line Network 800-SUICIDE (860-5113)        Your appointments     May 02, 2017  2:20 PM   Diabetes Care Visit with Mickie Lawson M.D., [facility] RN   [facility]) [address] [address] [phone]           You will be receiving a confirmation call a few days before your appointment from our automated call confirmation system.                 Discharge Medication Instructions:    Below are the medications your physician expects you to take upon discharge:    Review all your home medications and newly ordered medications with your doctor and/or pharmacist. Follow medication instructions as directed by your doctor and/or pharmacist.    Please keep your medication list with you and share with your physician.               Medication List      START taking these medications        Instructions    predniSONE 10 MG Tabs   Commonly known as:  DELTASONE    PREDNISONE 60MG PO DAILY FOR THE 1ST 2DAYS PREDNISONE 50MG PO DAILY FOR THE 2ND 2DAYS PREDNISONE 40MG PO DAILY FOR THE 3RD 2DAYS PREDNISONE 30MG PO DAILY FOR THE 4TH 2DAYS PREDNISONE 20MG PO DAILY FOR THE 5TH 2DAYS PREDNISONE 10MG PO DAILY FOR THE 6TH 2DAYS       tiotropium 18 MCG Caps   Last time this was given:  1 Cap on 3/17/2017  9:11 AM    Commonly known as:  SPIRIVA    Inhale 1 Cap by mouth every day.   Dose:  18 mcg         CONTINUE taking these medications        Instructions    * albuterol 108 (90 BASE) MCG/ACT Aers inhalation aerosol    Inhale 2 Puffs by mouth every 6 hours as needed for Shortness of Breath.   Dose:  2 Puff       * albuterol 2.5mg/3ml Nebu solution for nebulization   Commonly known as:  PROVENTIL    3 mL by Nebulization route every four hours as needed for Shortness of Breath.   Dose:  2.5 mg       cyclobenzaprine 10 MG Tabs   Last time this was given:  10 mg on 3/16/2017  8:08 PM   Commonly known as:  FLEXERIL    TAKE 1 TABLET BY MOUTH THREE TIMES DAILY AS NEEDED FOR MILD PAIN       duloxetine 60 MG Cpep delayed-release capsule   Last time this was given:  60 mg on 3/17/2017  8:32 AM   Commonly known as:  CYMBALTA    Take 60 mg by mouth every day.   Dose:  60 mg       escitalopram 10 MG Tabs   Last time this was given:  10 mg on 3/17/2017  8:32 AM   Commonly known as:  LEXAPRO    Take 1 Tab by mouth every day.   Dose:  10 mg       fluticasone-salmeterol 500-50 MCG/DOSE Aepb   Commonly known as:  ADVAIR    Doctor's comments:  Needs OV by 9/2016   Inhale 1 Puff by mouth every 12 hours.   Dose:  1 Puff       gabapentin 600 MG tablet   Commonly known as:  NEURONTIN    Take 1 Tab by mouth 3 times a day.   Dose:  600 mg       HAIR/SKIN/NAILS PO    Take 1 Tab by mouth every day.   Dose:  1 Tab       insulin glargine 100 UNIT/ML Soln   Last time this was given:  20 Units on 3/16/2017  8:44 PM   Commonly known as:  LANTUS    Inject 20 Units as instructed every evening.   Dose:  20 Units       lactulose 10 GM/15ML Soln    Take 30 mL by mouth 2 times a day as needed (constipation).   Dose:  20 g       levothyroxine 75 MCG Tabs   Last time this was given:  75 mcg on 3/17/2017  6:19 AM   Commonly known as:  SYNTHROID    TAKE 1 TABLET BY MOUTH DAILY       lisinopril 40 MG tablet   Last time this was given:  5 mg on 3/17/2017  8:39 AM    Commonly known as:  PRINIVIL, ZESTRIL    Take 1 Tab by mouth every day.   Dose:  40 mg       montelukast 10 MG Tabs   Commonly known as:  SINGULAIR    Take 1 Tab by mouth every day.   Dose:  10 mg       Non Formulary Request    Take 1 Tab by mouth every day. Antioxidants   Dose:  1 Tab       omeprazole 20 MG delayed-release capsule   Last time this was given:  20 mg on 3/8/2017  7:54 AM   Commonly known as:  PRILOSEC    Take 1 Cap by mouth every day.   Dose:  20 mg       oxycodone immediate release 10 MG immediate release tablet   Last time this was given:  10 mg on 3/17/2017  4:09 AM   Commonly known as:  ROXICODONE    Doctor's comments:  May fill 4/17/17   Take 1-2 Tabs by mouth every 6 hours as needed for Moderate Pain (back pain and knee pain).   Dose:  10-20 mg       simvastatin 10 MG Tabs   Last time this was given:  10 mg on 3/16/2017  8:51 PM   Commonly known as:  ZOCOR    TAKE 1 TABLET BY MOUTH EVERY EVENING       trazodone 100 MG Tabs   Last time this was given:  300 mg on 3/15/2017  8:42 PM   Commonly known as:  DESYREL    TAKE 3 TABLETS BY MOUTH NIGHTLY AT BEDTIME AS NEEDED FOR SLEEP       * Notice:  This list has 2 medication(s) that are the same as other medications prescribed for you. Read the directions carefully, and ask your doctor or other care provider to review them with you.            Instructions           Diet / Nutrition:    Follow any diet instructions given to you by your doctor or the dietician, including how much salt (sodium) you are allowed each day.    If you are overweight, talk to your doctor about a weight reduction plan.    Activity:    Remain physically active following your doctor's instructions about exercise and activity.    Rest often.     Any time you become even a little tired or short of breath, SIT DOWN and rest.    Worsening Symptoms:    Report any of the following signs and symptoms to the doctor's office immediately:    *Pain of jaw, arm, or neck  *Chest pain not  relieved by medication                               *Dizziness or loss of consciousness  *Difficulty breathing even when at rest   *More tired than usual                                       *Bleeding drainage or swelling of surgical site  *Swelling of feet, ankles, legs or stomach                 *Fever (>100ºF)  *Pink or blood tinged sputum  *Weight gain (3lbs/day or 5lbs /week)           *Shock from internal defibrillator (if applicable)  *Palpitations or irregular heartbeats                *Cool and/or numb extremities    Stroke Awareness    Common Risk Factors for Stroke include:    Age  Atrial Fibrillation  Carotid Artery Stenosis  Diabetes Mellitus  Excessive alcohol consumption  High blood pressure  Overweight   Physical inactivity  Smoking    Warning signs and symptoms of a stroke include:    *Sudden numbness or weakness of the face, arm or leg (especially on one side of the body).  *Sudden confusion, trouble speaking or understanding.  *Sudden trouble seeing in one or both eyes.  *Sudden trouble walking, dizziness, loss of balance or coordination.Sudden severe headache with no known cause.    It is very important to get treatment quickly when a stroke occurs. If you experience any of the above warning signs, call 911 immediately.                   Disclaimer         Quit Smoking / Tobacco Use:    I understand the use of any tobacco products increases my chance of suffering from future heart disease or stroke and could cause other illnesses which may shorten my life. Quitting the use of tobacco products is the single most important thing I can do to improve my health. For further information on smoking / tobacco cessation call a Toll Free Quit Line at 1-996.384.1222 (*National Cancer Christine) or 1-282.574.6270 (American Lung Association) or you can access the web based program at www.lungusa.org.    [place] Tobacco Users Help Line:  (657) 992-3709       Toll Free: 1-597.190.5490  Quit Tobacco Program  [facility] (611)630-2236    Crisis Hotline:    [place] Crisis Hotline:  6-991-WTAQJAZ or 1-785.416.3264    [place] Crisis Hotline:    1-621.647.8716 or 020-314-2233    Discharge Survey:   Thank you for choosing [facility]. We hope we did everything we could to make your hospital stay a pleasant one. You may be receiving a phone survey and we would appreciate your time and participation in answering the questions. Your input is very valuable to us in our efforts to improve our service to our patients and their families.        My signature on this form indicates that:    1. I have reviewed and understand the above information.  2. My questions regarding this information have been answered to my satisfaction.  3. I have formulated a plan with my discharge nurse to obtain my prescribed medications for home.                  Disclaimer         __________________________________                     __________       ________                       Patient Signature                                                 Date                    Time

## 2017-03-07 NOTE — IP AVS SNAPSHOT
3/17/2017          Priya Callahan  [address] [place] [address]    Dear Priya:    [facility] wants to ensure your discharge home is safe and you or your loved ones have had all your questions answered regarding your care after you leave the hospital.    You may receive a telephone call within two days of your discharge.  This call is to make certain you understand your discharge instructions as well as ensure we provided you with the best care possible during your stay with us.     The call will only last approximately 3-5 minutes and will be done by a nurse.    Once again, we want to ensure your discharge home is safe and that you have a clear understanding of any next steps in your care.  If you have any questions or concerns, please do not hesitate to contact us, we are here for you.  Thank you for choosing [facility] for your healthcare needs.    Sincerely,    Arnaldo Roach    [facility]

## 2017-03-07 NOTE — IP AVS SNAPSHOT
Vibrow Access Code: Activation code not generated  Current Vibrow Status: Patient Declined    Your email address is not on file at [facility].  Email Addresses are required for you to sign up for Vibrow, please contact [phone] to verify your personal information and to provide your email address prior to attempting to register for Vibrow.    Priya Burt London  [address] [place] [address]    Vibrow  A secure, online tool to manage your health information     [facility]’s Vibrow® is a secure, online tool that connects you to your personalized health information from the privacy of your home -- day or night - making it very easy for you to manage your healthcare. Once the activation process is completed, you can even access your medical information using the Vibrow mercy, which is available for free in the Apple Mercy store or Google Play store.     To learn more about Vibrow, visit [email or web address]    There are two levels of access available (as shown below):   My Chart Features  [facility] Primary [facility] Doctor [facility]  Specialists [facility]  Urgent  Care Non-[facility] Primary Care Doctor   Email your healthcare team securely and privately 24/7 X X X    Manage appointments: schedule your next appointment; view details of past/upcoming appointments X      Request prescription refills. X      View recent personal medical records, including lab and immunizations X X X X   View health record, including health history, allergies, medications X X X X   Read reports about your outpatient visits, procedures, consult and ER notes X X X X   See your discharge summary, which is a recap of your hospital and/or ER visit that includes your diagnosis, lab results, and care plan X X  X     How to register for Cl[employer]t:  Once your e-mail address has been verified, follow the following steps to sign up for Cluttert.     1. Go to  [email or web address]  2. Click on the Sign Up Now box, which takes you to the  New Member Sign Up page. You will need to provide the following information:  a. Enter your Sourcebits Access Code exactly as it appears at the top of this page. (You will not need to use this code after you’ve completed the sign-up process. If you do not sign up before the expiration date, you must request a new code.)   b. Enter your date of birth.   c. Enter your home email address.   d. Click Submit, and follow the next screen’s instructions.  3. Create a Sourcebits ID. This will be your Sourcebits login ID and cannot be changed, so think of one that is secure and easy to remember.  4. Create a Sourcebits password. You can change your password at any time.  5. Enter your Password Reset Question and Answer. This can be used at a later time if you forget your password.   6. Enter your e-mail address. This allows you to receive e-mail notifications when new information is available in Sourcebits.  7. Click Sign Up. You can now view your health information.    For assistance activating your Sourcebits account, call [phone]

## 2017-03-07 NOTE — IP AVS SNAPSHOT
" <p align=\"LEFT\"><IMG SRC=\"//[email or web address]\" alt=\"Image\" WIDTH=\"50%\" HEIGHT=\"200\" BORDER=\"\"></p>                   Name:Priya Callahan  Medical Record Number:[id number]  CSN: [id number]    YOB: 1950   Age: 66 y.o.  Sex: female  HT:1.651 m (5' 5\") WT: 110.6 kg (243 lb 13.3 oz)          Admit Date: 3/7/2017     Discharge Date:   Today's Date: 3/17/2017  Attending Doctor:  Terence Macias M.D.                  Allergies:  Keflex; Codeine; Lyrica; and Sudafed          Your appointments     May 02, 2017  2:20 PM   Diabetes Care Visit with Mickie Lawson M.D., [place] [facility] RN   [address] [address] [phone]           You will be receiving a confirmation call a few days before your appointment from our automated call confirmation system.                 Medication List      Take these Medications        Instructions    * albuterol 108 (90 BASE) MCG/ACT Aers inhalation aerosol    Inhale 2 Puffs by mouth every 6 hours as needed for Shortness of Breath.   Dose:  2 Puff       * albuterol 2.5mg/3ml Nebu solution for nebulization   Commonly known as:  PROVENTIL    3 mL by Nebulization route every four hours as needed for Shortness of Breath.   Dose:  2.5 mg       cyclobenzaprine 10 MG Tabs   Commonly known as:  FLEXERIL    TAKE 1 TABLET BY MOUTH THREE TIMES DAILY AS NEEDED FOR MILD PAIN       duloxetine 60 MG Cpep delayed-release capsule   Commonly known as:  CYMBALTA    Take 60 mg by mouth every day.   Dose:  60 mg       escitalopram 10 MG Tabs   Commonly known as:  LEXAPRO    Take 1 Tab by mouth every day.   Dose:  10 mg       fluticasone-salmeterol 500-50 MCG/DOSE Aepb   Commonly known as:  ADVAIR    Doctor's comments:  Needs OV by 9/2016   Inhale 1 Puff by mouth every 12 hours.   Dose:  1 Puff       gabapentin 600 MG tablet   Commonly known as:  NEURONTIN    Take 1 Tab by mouth 3 times a day.   Dose:  600 mg   "    HAIR/SKIN/NAILS PO    Take 1 Tab by mouth every day.   Dose:  1 Tab       insulin glargine 100 UNIT/ML Soln   Commonly known as:  LANTUS    Inject 20 Units as instructed every evening.   Dose:  20 Units       lactulose 10 GM/15ML Soln    Take 30 mL by mouth 2 times a day as needed (constipation).   Dose:  20 g       levothyroxine 75 MCG Tabs   Commonly known as:  SYNTHROID    TAKE 1 TABLET BY MOUTH DAILY       lisinopril 40 MG tablet   Commonly known as:  PRINIVIL, ZESTRIL    Take 1 Tab by mouth every day.   Dose:  40 mg       montelukast 10 MG Tabs   Commonly known as:  SINGULAIR    Take 1 Tab by mouth every day.   Dose:  10 mg       Non Formulary Request    Take 1 Tab by mouth every day. Antioxidants   Dose:  1 Tab       omeprazole 20 MG delayed-release capsule   Commonly known as:  PRILOSEC    Take 1 Cap by mouth every day.   Dose:  20 mg       oxycodone immediate release 10 MG immediate release tablet   Commonly known as:  ROXICODONE    Doctor's comments:  May fill 4/17/17   Take 1-2 Tabs by mouth every 6 hours as needed for Moderate Pain (back pain and knee pain).   Dose:  10-20 mg       predniSONE 10 MG Tabs   Commonly known as:  DELTASONE    PREDNISONE 60MG PO DAILY FOR THE 1ST 2DAYS PREDNISONE 50MG PO DAILY FOR THE 2ND 2DAYS PREDNISONE 40MG PO DAILY FOR THE 3RD 2DAYS PREDNISONE 30MG PO DAILY FOR THE 4TH 2DAYS PREDNISONE 20MG PO DAILY FOR THE 5TH 2DAYS PREDNISONE 10MG PO DAILY FOR THE 6TH 2DAYS       simvastatin 10 MG Tabs   Commonly known as:  ZOCOR    TAKE 1 TABLET BY MOUTH EVERY EVENING       tiotropium 18 MCG Caps   Commonly known as:  SPIRIVA    Inhale 1 Cap by mouth every day.   Dose:  18 mcg       trazodone 100 MG Tabs   Commonly known as:  DESYREL    TAKE 3 TABLETS BY MOUTH NIGHTLY AT BEDTIME AS NEEDED FOR SLEEP       * Notice:  This list has 2 medication(s) that are the same as other medications prescribed for you. Read the directions carefully, and ask your doctor or other care provider to review  them with you.

## 2017-03-08 ENCOUNTER — APPOINTMENT (OUTPATIENT)
Dept: RADIOLOGY | Facility: MEDICAL CENTER | Age: 67
DRG: 871 | End: 2017-03-08
Attending: INTERNAL MEDICINE
Payer: MEDICARE

## 2017-03-08 PROBLEM — N17.9 ACUTE ON CHRONIC RENAL FAILURE (HCC): Status: ACTIVE | Noted: 2017-03-08

## 2017-03-08 PROBLEM — R65.21 SEVERE SEPSIS WITH SEPTIC SHOCK (HCC): Status: ACTIVE | Noted: 2017-03-08

## 2017-03-08 PROBLEM — E87.1 HYPONATREMIA: Status: ACTIVE | Noted: 2017-03-08

## 2017-03-08 PROBLEM — A41.9 SEVERE SEPSIS WITH SEPTIC SHOCK (HCC): Status: ACTIVE | Noted: 2017-03-08

## 2017-03-08 PROBLEM — D64.9 ANEMIA: Status: ACTIVE | Noted: 2017-03-08

## 2017-03-08 PROBLEM — N18.9 ACUTE ON CHRONIC RENAL FAILURE (HCC): Status: ACTIVE | Noted: 2017-03-08

## 2017-03-08 PROBLEM — E87.5 HYPERKALEMIA: Status: ACTIVE | Noted: 2017-03-08

## 2017-03-08 PROBLEM — J96.20 ACUTE ON CHRONIC RESPIRATORY FAILURE (HCC): Status: ACTIVE | Noted: 2017-03-08

## 2017-03-08 LAB
ANION GAP SERPL CALC-SCNC: 5 MMOL/L (ref 0–11.9)
BUN SERPL-MCNC: 36 MG/DL (ref 8–22)
CALCIUM SERPL-MCNC: 8.8 MG/DL (ref 8.5–10.5)
CHLORIDE SERPL-SCNC: 111 MMOL/L (ref 96–112)
CHOLEST SERPL-MCNC: 76 MG/DL (ref 100–199)
CO2 SERPL-SCNC: 21 MMOL/L (ref 20–33)
CREAT SERPL-MCNC: 1.28 MG/DL (ref 0.5–1.4)
ERYTHROCYTE [DISTWIDTH] IN BLOOD BY AUTOMATED COUNT: 56.6 FL (ref 35.9–50)
EST. AVERAGE GLUCOSE BLD GHB EST-MCNC: 180 MG/DL
GFR SERPL CREATININE-BSD FRML MDRD: 42 ML/MIN/1.73 M 2
GLUCOSE BLD-MCNC: 128 MG/DL (ref 65–99)
GLUCOSE BLD-MCNC: 131 MG/DL (ref 65–99)
GLUCOSE BLD-MCNC: 132 MG/DL (ref 65–99)
GLUCOSE BLD-MCNC: 272 MG/DL (ref 65–99)
GLUCOSE BLD-MCNC: 92 MG/DL (ref 65–99)
GLUCOSE SERPL-MCNC: 149 MG/DL (ref 65–99)
GRAM STN SPEC: NORMAL
HBA1C MFR BLD: 7.9 % (ref 0–5.6)
HCT VFR BLD AUTO: 29.9 % (ref 37–47)
HDLC SERPL-MCNC: 27 MG/DL
HGB BLD-MCNC: 8.6 G/DL (ref 12–16)
LACTATE BLD-SCNC: 1 MMOL/L (ref 0.5–2)
LDLC SERPL CALC-MCNC: 36 MG/DL
MCH RBC QN AUTO: 25 PG (ref 27–33)
MCHC RBC AUTO-ENTMCNC: 28.8 G/DL (ref 33.6–35)
MCV RBC AUTO: 86.9 FL (ref 81.4–97.8)
PLATELET # BLD AUTO: 186 K/UL (ref 164–446)
PMV BLD AUTO: 9 FL (ref 9–12.9)
POTASSIUM SERPL-SCNC: 4.9 MMOL/L (ref 3.6–5.5)
RBC # BLD AUTO: 3.44 M/UL (ref 4.2–5.4)
SIGNIFICANT IND 70042: NORMAL
SITE SITE: NORMAL
SODIUM SERPL-SCNC: 137 MMOL/L (ref 135–145)
SOURCE SOURCE: NORMAL
TRIGL SERPL-MCNC: 64 MG/DL (ref 0–149)
WBC # BLD AUTO: 13.6 K/UL (ref 4.8–10.8)

## 2017-03-08 PROCEDURE — 94640 AIRWAY INHALATION TREATMENT: CPT

## 2017-03-08 PROCEDURE — 99291 CRITICAL CARE FIRST HOUR: CPT | Performed by: HOSPITALIST

## 2017-03-08 PROCEDURE — 87070 CULTURE OTHR SPECIMN AEROBIC: CPT

## 2017-03-08 PROCEDURE — A9270 NON-COVERED ITEM OR SERVICE: HCPCS | Performed by: HOSPITALIST

## 2017-03-08 PROCEDURE — 99291 CRITICAL CARE FIRST HOUR: CPT | Performed by: INTERNAL MEDICINE

## 2017-03-08 PROCEDURE — 700111 HCHG RX REV CODE 636 W/ 250 OVERRIDE (IP): Performed by: INTERNAL MEDICINE

## 2017-03-08 PROCEDURE — 83605 ASSAY OF LACTIC ACID: CPT

## 2017-03-08 PROCEDURE — 700105 HCHG RX REV CODE 258: Performed by: INTERNAL MEDICINE

## 2017-03-08 PROCEDURE — 94667 MNPJ CHEST WALL 1ST: CPT

## 2017-03-08 PROCEDURE — 71010 DX-CHEST-PORTABLE (1 VIEW): CPT

## 2017-03-08 PROCEDURE — 94668 MNPJ CHEST WALL SBSQ: CPT

## 2017-03-08 PROCEDURE — 700101 HCHG RX REV CODE 250: Performed by: INTERNAL MEDICINE

## 2017-03-08 PROCEDURE — A9270 NON-COVERED ITEM OR SERVICE: HCPCS | Performed by: INTERNAL MEDICINE

## 2017-03-08 PROCEDURE — 700102 HCHG RX REV CODE 250 W/ 637 OVERRIDE(OP): Performed by: HOSPITALIST

## 2017-03-08 PROCEDURE — 82962 GLUCOSE BLOOD TEST: CPT

## 2017-03-08 PROCEDURE — 770022 HCHG ROOM/CARE - ICU (200)

## 2017-03-08 PROCEDURE — 80061 LIPID PANEL: CPT

## 2017-03-08 PROCEDURE — 85027 COMPLETE CBC AUTOMATED: CPT

## 2017-03-08 PROCEDURE — 80048 BASIC METABOLIC PNL TOTAL CA: CPT

## 2017-03-08 PROCEDURE — 700102 HCHG RX REV CODE 250 W/ 637 OVERRIDE(OP): Performed by: INTERNAL MEDICINE

## 2017-03-08 PROCEDURE — 87205 SMEAR GRAM STAIN: CPT

## 2017-03-08 RX ORDER — SODIUM POLYSTYRENE SULFONATE 15 G/60ML
30 SUSPENSION ORAL; RECTAL ONCE
Status: DISCONTINUED | OUTPATIENT
Start: 2017-03-08 | End: 2017-03-08

## 2017-03-08 RX ORDER — OXYCODONE HYDROCHLORIDE 5 MG/1
10-20 TABLET ORAL EVERY 6 HOURS PRN
Status: DISCONTINUED | OUTPATIENT
Start: 2017-03-08 | End: 2017-03-08

## 2017-03-08 RX ORDER — DEXTROSE MONOHYDRATE 25 G/50ML
25 INJECTION, SOLUTION INTRAVENOUS ONCE
Status: DISCONTINUED | OUTPATIENT
Start: 2017-03-08 | End: 2017-03-08 | Stop reason: CLARIF

## 2017-03-08 RX ORDER — OXYCODONE HYDROCHLORIDE 10 MG/1
10 TABLET ORAL EVERY 6 HOURS PRN
Status: DISCONTINUED | OUTPATIENT
Start: 2017-03-08 | End: 2017-03-17 | Stop reason: HOSPADM

## 2017-03-08 RX ORDER — TIOTROPIUM BROMIDE 18 UG/1
1 CAPSULE ORAL; RESPIRATORY (INHALATION)
Status: DISCONTINUED | OUTPATIENT
Start: 2017-03-08 | End: 2017-03-17 | Stop reason: HOSPADM

## 2017-03-08 RX ORDER — GABAPENTIN 300 MG/1
600 CAPSULE ORAL EVERY 8 HOURS
Status: DISCONTINUED | OUTPATIENT
Start: 2017-03-08 | End: 2017-03-17 | Stop reason: HOSPADM

## 2017-03-08 RX ADMIN — OXYCODONE HYDROCHLORIDE 10 MG: 5 TABLET ORAL at 16:09

## 2017-03-08 RX ADMIN — OXYCODONE HYDROCHLORIDE 10 MG: 5 TABLET ORAL at 22:20

## 2017-03-08 RX ADMIN — NYSTATIN 1500000 UNITS: 100000 POWDER TOPICAL at 14:54

## 2017-03-08 RX ADMIN — HEPARIN SODIUM 5000 UNITS: 5000 INJECTION, SOLUTION INTRAVENOUS; SUBCUTANEOUS at 14:54

## 2017-03-08 RX ADMIN — BUDESONIDE AND FORMOTEROL FUMARATE DIHYDRATE 2 PUFF: 160; 4.5 AEROSOL RESPIRATORY (INHALATION) at 06:31

## 2017-03-08 RX ADMIN — IPRATROPIUM BROMIDE AND ALBUTEROL SULFATE 3 ML: .5; 3 SOLUTION RESPIRATORY (INHALATION) at 10:13

## 2017-03-08 RX ADMIN — IPRATROPIUM BROMIDE AND ALBUTEROL SULFATE 3 ML: .5; 3 SOLUTION RESPIRATORY (INHALATION) at 14:15

## 2017-03-08 RX ADMIN — INSULIN GLARGINE 20 UNITS: 100 INJECTION, SOLUTION SUBCUTANEOUS at 21:06

## 2017-03-08 RX ADMIN — NYSTATIN 1500000 UNITS: 100000 POWDER TOPICAL at 21:05

## 2017-03-08 RX ADMIN — IPRATROPIUM BROMIDE AND ALBUTEROL SULFATE 3 ML: .5; 3 SOLUTION RESPIRATORY (INHALATION) at 03:50

## 2017-03-08 RX ADMIN — TIOTROPIUM BROMIDE 1 CAPSULE: 18 CAPSULE ORAL; RESPIRATORY (INHALATION) at 06:31

## 2017-03-08 RX ADMIN — NOREPINEPHRINE BITARTRATE 4 MCG/MIN: 1 INJECTION INTRAVENOUS at 10:04

## 2017-03-08 RX ADMIN — SODIUM CHLORIDE: 9 INJECTION, SOLUTION INTRAVENOUS at 00:01

## 2017-03-08 RX ADMIN — IPRATROPIUM BROMIDE AND ALBUTEROL SULFATE 3 ML: .5; 3 SOLUTION RESPIRATORY (INHALATION) at 22:30

## 2017-03-08 RX ADMIN — STANDARDIZED SENNA CONCENTRATE AND DOCUSATE SODIUM 2 TABLET: 8.6; 5 TABLET, FILM COATED ORAL at 07:53

## 2017-03-08 RX ADMIN — HEPARIN SODIUM 5000 UNITS: 5000 INJECTION, SOLUTION INTRAVENOUS; SUBCUTANEOUS at 05:47

## 2017-03-08 RX ADMIN — LEVOTHYROXINE SODIUM 75 MCG: 75 TABLET ORAL at 06:22

## 2017-03-08 RX ADMIN — ESCITALOPRAM OXALATE 10 MG: 10 TABLET ORAL at 07:56

## 2017-03-08 RX ADMIN — FAMOTIDINE 20 MG: 20 TABLET, FILM COATED ORAL at 07:54

## 2017-03-08 RX ADMIN — SODIUM CHLORIDE: 9 INJECTION, SOLUTION INTRAVENOUS at 07:53

## 2017-03-08 RX ADMIN — BUDESONIDE AND FORMOTEROL FUMARATE DIHYDRATE 2 PUFF: 160; 4.5 AEROSOL RESPIRATORY (INHALATION) at 19:26

## 2017-03-08 RX ADMIN — AMPICILLIN SODIUM AND SULBACTAM SODIUM 3 G: 2; 1 INJECTION, POWDER, FOR SOLUTION INTRAMUSCULAR; INTRAVENOUS at 16:10

## 2017-03-08 RX ADMIN — IPRATROPIUM BROMIDE AND ALBUTEROL SULFATE 3 ML: .5; 3 SOLUTION RESPIRATORY (INHALATION) at 06:30

## 2017-03-08 RX ADMIN — OXYCODONE HYDROCHLORIDE 10 MG: 5 TABLET ORAL at 09:51

## 2017-03-08 RX ADMIN — IPRATROPIUM BROMIDE AND ALBUTEROL SULFATE 3 ML: .5; 3 SOLUTION RESPIRATORY (INHALATION) at 19:26

## 2017-03-08 RX ADMIN — AMPICILLIN SODIUM AND SULBACTAM SODIUM 3 G: 2; 1 INJECTION, POWDER, FOR SOLUTION INTRAMUSCULAR; INTRAVENOUS at 03:59

## 2017-03-08 RX ADMIN — DULOXETINE HYDROCHLORIDE 60 MG: 60 CAPSULE, DELAYED RELEASE ORAL at 07:54

## 2017-03-08 RX ADMIN — HEPARIN SODIUM 5000 UNITS: 5000 INJECTION, SOLUTION INTRAVENOUS; SUBCUTANEOUS at 22:16

## 2017-03-08 RX ADMIN — GABAPENTIN 600 MG: 300 CAPSULE ORAL at 14:53

## 2017-03-08 RX ADMIN — GABAPENTIN 600 MG: 300 CAPSULE ORAL at 09:51

## 2017-03-08 RX ADMIN — GUAIFENESIN 600 MG: 600 TABLET, EXTENDED RELEASE ORAL at 21:05

## 2017-03-08 RX ADMIN — AMPICILLIN SODIUM AND SULBACTAM SODIUM 3 G: 2; 1 INJECTION, POWDER, FOR SOLUTION INTRAMUSCULAR; INTRAVENOUS at 09:51

## 2017-03-08 RX ADMIN — AMPICILLIN SODIUM AND SULBACTAM SODIUM 3 G: 2; 1 INJECTION, POWDER, FOR SOLUTION INTRAMUSCULAR; INTRAVENOUS at 22:15

## 2017-03-08 RX ADMIN — OMEPRAZOLE 20 MG: 20 CAPSULE, DELAYED RELEASE ORAL at 07:54

## 2017-03-08 RX ADMIN — SODIUM CHLORIDE: 9 INJECTION, SOLUTION INTRAVENOUS at 16:10

## 2017-03-08 RX ADMIN — INSULIN LISPRO 5 UNITS: 100 INJECTION, SOLUTION INTRAVENOUS; SUBCUTANEOUS at 11:57

## 2017-03-08 RX ADMIN — INSULIN GLARGINE 20 UNITS: 100 INJECTION, SOLUTION SUBCUTANEOUS at 01:14

## 2017-03-08 RX ADMIN — STANDARDIZED SENNA CONCENTRATE AND DOCUSATE SODIUM 2 TABLET: 8.6; 5 TABLET, FILM COATED ORAL at 21:05

## 2017-03-08 RX ADMIN — AZITHROMYCIN 500 MG: 500 INJECTION, POWDER, LYOPHILIZED, FOR SOLUTION INTRAVENOUS at 21:05

## 2017-03-08 RX ADMIN — NICOTINE 21 MG: 21 PATCH TRANSDERMAL at 05:47

## 2017-03-08 RX ADMIN — GUAIFENESIN 600 MG: 600 TABLET, EXTENDED RELEASE ORAL at 07:55

## 2017-03-08 RX ADMIN — SIMVASTATIN 10 MG: 20 TABLET, FILM COATED ORAL at 21:05

## 2017-03-08 RX ADMIN — GABAPENTIN 600 MG: 300 CAPSULE ORAL at 22:17

## 2017-03-08 ASSESSMENT — ENCOUNTER SYMPTOMS
FEVER: 0
BLURRED VISION: 0
SORE THROAT: 0
DIZZINESS: 0
NECK PAIN: 0
SHORTNESS OF BREATH: 1
NAUSEA: 0
CHILLS: 0
TINGLING: 0
DEPRESSION: 0
INSOMNIA: 0
HEADACHES: 0
EYE PAIN: 0
BACK PAIN: 0
ABDOMINAL PAIN: 0
PALPITATIONS: 0
COUGH: 1
VOMITING: 0

## 2017-03-08 ASSESSMENT — LIFESTYLE VARIABLES
ALCOHOL_USE: NO
EVER_SMOKED: YES
PACK_YEARS: 40
PACK_YEARS: 40
EVER_SMOKED: YES

## 2017-03-08 ASSESSMENT — PAIN SCALES - GENERAL
PAINLEVEL_OUTOF10: 7
PAINLEVEL_OUTOF10: 5
PAINLEVEL_OUTOF10: 0
PAINLEVEL_OUTOF10: 6
PAINLEVEL_OUTOF10: 0
PAINLEVEL_OUTOF10: 0
PAINLEVEL_OUTOF10: 6
PAINLEVEL_OUTOF10: 6

## 2017-03-08 ASSESSMENT — COPD QUESTIONNAIRES
HAVE YOU SMOKED AT LEAST 100 CIGARETTES IN YOUR ENTIRE LIFE: YES
DURING THE PAST 4 WEEKS HOW MUCH DID YOU FEEL SHORT OF BREATH: SOME OF THE TIME
DO YOU EVER COUGH UP ANY MUCUS OR PHLEGM?: YES, A FEW DAYS A WEEK OR MONTH
COPD SCREENING SCORE: 7

## 2017-03-08 NOTE — H&P
ADMITTING ATTENDING:  Eugene Castro MD    PRIMARY CARE PHYSICIAN:  Mickie Lawson MD    CONSULTANTS:  Angel Coreas MD, from pulmonology and critical care.    CHIEF COMPLAINT:  Cough and weakness.    HISTORY OF PRESENT ILLNESS:  This is a 66-year-old female with a past medical   history of COPD, on 2 liters of oxygen; spinal stenosis, fibromyalgia,   diabetes mellitus, hypertension, obstructive sleep apnea, not on CPAP, who   presents with complaints of worsening cough over the past few days.  Patient   states that she also felt progressive weakness and had a mechanical fall and   was unable to get herself up.  Patient notes productive cough with thick brown   sputum.  She notes worsening shortness of breath.  She denies any fevers,   chest pain, nausea, vomiting, diarrhea, dysuria or lightheadedness.  In the   ER, the patient was found to be hypotensive with a blood pressure of 69/30 and   febrile with a temperature of 100.7.  Patient was started on IV fluids per   septic protocol.  The patient persisted to be hypotensive and therefore was   started on vasopressor therapy.    REVIEW OF SYSTEMS:  Please see HPI, all other systems were reviewed and are   negative per AMA and CMS criteria.    PAST MEDICAL HISTORY:  COPD, on 2 liters of oxygen; history of asthma,   hypertension, arthritis, fibromyalgia, neuropathy, diabetes mellitus type 2,   congestive heart failure with preserved systolic function, GERD, sleep apnea,   does not wear CPAP; hypothyroidism.    PAST SURGICAL HISTORY:  Hysterectomy, left knee replacement, lumpectomy.    MEDICATION ALLERGIES:  KEFLEX, CODEINE, LYRICA, AND SUDAFED.    FAMILY HISTORY:  Mother had heart disease, alcoholism and COPD.    SOCIAL HISTORY:  Patient recently quit smoking a week ago.  She smoked a pack   a day for 50 years.  She denies alcohol or illicit drug use.    MEDICATION HISTORY:  Patient is on Lantus 20 units every morning, Cymbalta 60   every day, Synthroid 75 mcg  daily, Advair 1 puff b.i.d., omeprazole 20 daily,   Zocor 10 daily, Lexapro 10 mg daily, Roxicodone 10 mg 1-2 every 6 hours as   needed, lisinopril 40 mg 1 tab daily, trazodone 100 mg 3 times a night p.r.n.,   Flexeril 10 mg t.i.d. p.r.n., lactulose 30 mL b.i.d. p.r.n., albuterol 3 mL   every 4 hours p.r.n. shortness of breath, gabapentin 600 mg t.i.d., Singulair   1 tablet daily.    PHYSICAL EXAMINATION:  VITAL SIGNS:  BMI is 38.48, blood pressure 69/30, pulse is 64, temperature is   37.1, respiratory rate is 22, oxygen saturation is 99% on 4 L of oxygen.  GENERAL:  Obese female, in no acute distress.  HEENT:  Atraumatic head.  PERRLA.  Mucous membranes are dry.  NECK:  Supple without lymphadenopathy.  RESPIRATORY:  Bilateral crackles, right greater than left.  No active   wheezing.  Increased respiratory effort.  CARDIOVASCULAR:  Normal rate and rhythm.  ABDOMEN:  Soft, nontender, nondistended.  EXTREMITIES:  Pulses 1+ bilaterally.  No cyanosis or edema.  NEUROLOGICAL:  Cranial nerves II-XII intact.  No focal deficits.  Motor   strength is 5/5 bilaterally.  Sensation intact and equal bilaterally.  Rapid   alternating movements and finger-to-nose normal.  PSYCHIATRY:  Alert and oriented to time, place, and person.  Normal mood and   affect.  SKIN:  No rashes or ulcers seen.  Capillary refill less than 3 seconds.    PERTINENT LABORATORY DATA:  Lactic acid is 2.3.  CBC:  WBC is 15.6, hemoglobin   is 8.7, platelet count is 221.  Sodium 133, potassium 6.3, chloride 104,   bicarbonate 22, anion gap 7, glucose 239, BUN 52, creatinine 1.89, calcium   8.4.  AST 17, ALT 10, alkaline phosphatase 63, total bilirubin 0.6, albumin is   2.8.  PT 15.8, INR 1.2, APTT 37.1.  TSH 1.4.  Magnesium 1.9.  Urinalysis   negative for leukocyte esterase, nitrite, wbcs 0-2.    IMAGING:  Chest x-ray, diffuse patchy airspace opacities throughout the right   lung, most consistent with pneumonitis/pneumonia.  EKG, sinus rhythm, rate 70,   QTC  432.  No acute ST-T wave changes.    ASSESSMENT AND PLAN:  1.  Septic shock -- patient will be admitted to the ICU with close cardiac   monitoring.  Patient has received IV fluids per septic protocol.  Patient has   been started on vasopressor therapy.  Patient has a central line in place.    Pulmonary and critical care are on board.  Patient's lactate has improved from   2.3-1.  Patient has been started on Unasyn and azithromycin.  We will follow   blood cultures.  2.  Acute on chronic hypoxemic respiratory failure -- patient is on   supplemental oxygen 4 liters.  She will be started on breathing treatments   with RT protocol.  We will continue her inhalers with Symbicort and start her   on Spiriva.  Patient has no active wheezing; therefore, we will hold off on IV   steroids.  We will continue antibiotics.  Patient will be given Mucinex 600   mg to help with expectoration of mucus.  3.  Community-acquired pneumonia -- as above.  4.  Acute on chronic kidney disease.  Patient's baseline creatinine is 1.32.    It has bumped up to 1.89.  We have provided IV fluid resuscitation with likely   due to hypovolemia.  We will assess response to fluids and monitor her kidney   function closely.  We will avoid nephrotoxic medications and IV contrast.  5.  Hyperkalemia -- potassium is at 6.3.  Patient will be given IV  calcium gluconate.  Her EKG does reveal some flattened P-waves and she is   found to be with a lower heart rate despite being hypotensive and on pressors.  Patient will also receive Kayexalate and IV insulin with dextrose. We will continue to monitor her potassium closely.  If it remains persistently elevated, we will consider consulting nephrology.  6.  Hyponatremia -- I suspect this is due to hypovolemia.  Patient is on IV   fluid hydration.  We will monitor this.  7.  History of congestive heart failure with diastolic dysfunction.  Patient   does not appear to be in acute decompensation at this time.  We will  order a   2D echo for further evaluation of her heart function.  8.  Diabetes mellitus type 2.  Patient will be restarted on her home regimen   of insulin with Lantus 20 units nightly and insulin sliding scale with serial   Accu-Cheks.  We will check a hemoglobin A1c.  9.  Obstructive sleep apnea.  Patient states she does not use CPAP since she   cannot tolerate it.  10.  Gastroesophageal reflux disease.  The patient will be resumed on her   omeprazole.  11.  Hypothyroidism.  We will check a TSH and resume her home regimen of   Synthroid 75 mcg daily.    PROPHYLAXIS:  Heparin subQ 5000 units q. 8.    CODE STATUS:  Full code.       ____________________________________     MD DACIA Armenta / DMITRY    DD:  03/08/2017 02:08:43  DT:  03/08/2017 02:58:24    D#:  [id number]  Job#:  [id number]

## 2017-03-08 NOTE — WOUND TEAM
In to view pt's L ischia. There is POA intact purplish red non-blanching area 2 x 1.5 cm. Edges are diffuse, probably ecchymosis. Py has many areas to LUE and a few to RUE. Pt had a fall, no c/o pain when palpated, unable to palpate bony prominence. Will continue to monitor.

## 2017-03-08 NOTE — CARE PLAN
Problem: Oxygenation:  Goal: Maintain adequate oxygenation dependent on patient condition  Outcome: PROGRESSING AS EXPECTED    Problem: Bronchoconstriction:  Goal: Improve in air movement and diminished wheezing  Outcome: PROGRESSING AS EXPECTED    Problem: Hyperinflation:  Goal: Prevent or improve atelectasis  Outcome: PROGRESSING AS EXPECTED  PT IS is 1000

## 2017-03-08 NOTE — PROGRESS NOTES
Pt arrived to unit at approx 2345 with RN and CCT.  Levophed gtt stopped due to SBP in the 200s from leg, restarted again after moving BP cuff to arm.

## 2017-03-08 NOTE — CARE PLAN
Problem: Skin Integrity  Goal: Skin Integrity is maintained or improved  Redness under panus at POA.  Pictures taken and wound LDA opened.    Problem: Hemodynamic Status  Goal: Vital Signs and Fluid Balance Management  Levophed titrated for map>65.  CVP in high teens.  Mckeon inserted for accurate I&Os.

## 2017-03-08 NOTE — ED PROVIDER NOTES
"ED Provider Note    CHIEF COMPLAINT  Weak    HPI  Priya Callahan is a 66 y.o. female who presents complaining of weakness. The patient has fallen twice today, just feeling very tired and weak. She did not know that she had a temperature, that she felt very chilled earlier. She initially denies any chest pain or shortness of breath, but has been bringing up thick phlegm. She just stopped smoking a month ago wonders if this could be related. She denies any sore throat, neck pain. Denies any abdominal pain. No change in bowel or bladder. No leg pain or swelling. The patient did bump the back of her head when she fell, this is bothering her \"a little.\" Denies any rashes anywhere. She's had very diminished oral intake in general. She has chronic pain in her back, this is unchanged. Denies any recent hospitalization. There is no other complaint.    PAST MEDICAL HISTORY  Past Medical History   Diagnosis Date   • COPD    • ASTHMA    • Hypertension    • Pneumonia      as a child   • Bronchitis      as a child   • Unspecified urinary incontinence    • Fall    • Infectious disease      Hepatitis C   • Arthritis      \"everywhere\"   • Fibromyalgia    • Neuropathy (CMS-[facility])      bilat feet   • Other specified symptom associated with female genital organs      Hysterectomy at age 22yrs   • Diabetes      Type 2   • Hepatitis C      \"I have markers in blood but not active\"   • Congestive heart failure (CMS-[facility]) 2013     related to pulmonary failure   • Indigestion    • GERD (gastroesophageal reflux disease)    • Breath shortness      \"only with flare up with allergies\"   • Sleep apnea      does not wear CPAP, \"can't do it\"   • Disorder of thyroid      Hypothyroid   • Backpain      chronic back pain   • Heart burn    • Pain 9/13/2016     \"pain everywhere\"   • Psychiatric problem 9/13/2016     PTSD       FAMILY HISTORY  Family History   Problem Relation Age of Onset   • Lung Disease Mother    • Alcohol/Drug Mother    • Heart " Disease Mother    • Cancer Father    • Cancer Brother    • Diabetes Maternal Grandmother    • Stroke Paternal Grandmother        SOCIAL HISTORY  Social History   Substance Use Topics   • Smoking status: Former Smoker -- 1.00 packs/day for 50 years     Types: Cigarettes     Quit date: 02/01/2017   • Smokeless tobacco: Never Used   • Alcohol Use: No         SURGICAL HISTORY  Past Surgical History   Procedure Laterality Date   • Gyn surgery       hysterectomy    • Other orthopedic surgery       L knee replacement    • Other orthopedic surgery       R carpal tunnel    • Us-needle core bx-breast panel     • Lumpectomy Left      Left breast   • Pr inj dx/ther agnt paravert facet joint, bruce* Left 7/10/2015     Procedure: INJ PARA FACET L/S 1 LVL W/IG - L3-S1;  Surgeon: Xavier Arteaga D.O.;  Location: SURGERY [facility] ORS;  Service: Pain Management   • Pr inj dx/ther agnt paravert facet joint, bruce*  7/10/2015     Procedure: INJ PARA FACET L/S 2D LVL W/IG;  Surgeon: Xavier Arteaga D.O.;  Location: SURGERY [facility] ORS;  Service: Pain Management   • Pr inj dx/ther agnt paravert facet joint, bruce*  7/10/2015     Procedure: INJ PARA FACET L/S 3D LVL W/IG;  Surgeon: Xavier Arteaga D.O.;  Location: SURGERY [facility] ORS;  Service: Pain Management   • Pr inject nerv maciel shields periph nerv  7/10/2015     Procedure: INJ-ANES AGENT-OTHER PHER.NRVE;  Surgeon: Xavier Arteaga D.O.;  Location: SURGERY [facility] ORS;  Service: Pain Management   • Pr inj dx/ther agnt paravert facet joint, bruce* Left 7/17/2015     Procedure: INJ PARA FACET L/S 1 LVL W/IG - L3-S1;  Surgeon: Xavier Arteaga D.O.;  Location: SURGERY [facility] ORS;  Service: Pain Management   • Pr inj dx/ther agnt paravert facet joint, bruce*  7/17/2015     Procedure: INJ PARA FACET L/S 2D LVL W/IG;  Surgeon: Xavier Arteaga D.O.;  Location: SURGERY [facility] ORS;  Service: Pain Management   • Pr inj dx/ther agnt paravert facet joint, bruce*   7/17/2015     Procedure: INJ PARA FACET L/S 3D LVL W/IG;  Surgeon: Xavier Arteaga D.O.;  Location: [facility];  Service: Pain Management   • Pr inject nerv blck,othr periph nerv  7/17/2015     Procedure: INJ-ANES AGENT-OTHER PHER.NRVE;  Surgeon: Xavier Arteaga D.O.;  Location: [facility];  Service: Pain Management   • Pr dstr nrolytc agnt parverteb fct sngl lmbr/sacral Left 10/2/2015     Procedure: NEURO DEST FACET L/S W/IG SNGL - L3-S1;  Surgeon: Xavier Arteaga D.O.;  Location: SURGERY [facility];  Service: Pain Management   • Pr dstr nrolytc agnt parverteb fct addl lmbr/sacral  10/2/2015     Procedure: NEURO DEST FACET L/S W/IG ADDL;  Surgeon: Xavier Arteaga D.O.;  Location: SURGERY SURGICAL [facility]S;  Service: Pain Management   • Pr inject rx other periph nerve  10/2/2015     Procedure: NEUROLYTIC DEST-OTHER NERVE;  Surgeon: Xavier Arteaga D.O.;  Location: SURGERY SURGICAL [facility];  Service: Pain Management   • Pr dstr nrolytc agnt parverteb fct addl lmbr/sacral  10/2/2015     Procedure: NEURO DEST FACET L/S W/IG ADDL;  Surgeon: Xavier Arteaga D.O.;  Location: SURGERY SURG[facility];  Service: Pain Management   • Pr dstr nrolytc agnt parverteb fct sngl lmbr/sacral Right 11/6/2015     Procedure: NEURO DEST FACET L/S W/IG SNGL - L3-S1;  Surgeon: Xavier Arteaga D.O.;  Location: SURGERY SURGICAL [facility];  Service: Pain Management   • Pr dstr nrolytc agnt parverteb fct addl lmbr/sacral  11/6/2015     Procedure: NEURO DEST FACET L/S W/IG ADDL;  Surgeon: Xavier Arteaga D.O.;  Location: [facility];  Service: Pain Management   • Pr inject rx other periph nerve  11/6/2015     Procedure: NEUROLYTIC DEST-OTHER NERVE;  Surgeon: Xavier Arteaga D.O.;  Location: [facility];  Service: Pain Management   • Pr dstr nrolytc agnt parverteb fct addl lmbr/sacral  11/6/2015     Procedure: NEURO DEST FACET L/S W/IG ADDL;  Surgeon:  "Xavier Arteaga D.O.;  Location: [facility];  Service: Pain Management   • Pr dstr nrolytc agnt parverteb fct sngl lmbr/sacral Left 9/16/2016     Procedure: NEURO DEST FACET L/S W/IG SNGL - L3-S1;  Surgeon: Xavier Arteaga D.O.;  Location: [facility];  Service: Pain Management   • Pr dstr nrolytc agnt parverteb fct addl lmbr/sacral  9/16/2016     Procedure: NEURO DEST FACET L/S W/IG ADDL;  Surgeon: Xavier Arteaga D.O.;  Location: [facility];  Service: Pain Management   • Pr dstr nrolytc agnt parverteb fct addl lmbr/sacral  9/16/2016     Procedure: NEURO DEST FACET L/S W/IG ADDL;  Surgeon: Xavier Arteaga D.O.;  Location: [facility];  Service: Pain Management   • Pr fluoroscopic guidance needle placement  9/16/2016     Procedure: FLOURO GUIDE NEEDLE PLACEMENT;  Surgeon: Xavier Arteaga D.O.;  Location: [facility];  Service: Pain Management       CURRENT MEDICATIONS  Home Medications     **Home medications have not yet been reviewed for this encounter**          I have reviewed the nurses notes and/or the list brought with the patient.    ALLERGIES  Allergies   Allergen Reactions   • Keflex Rash     Sever rash   • Codeine Nausea     Severe stomach pain   • Food Diarrhea     Lemon and Lime allergy   • Lyrica Nausea     Nausea, dizzy   • Sudafed Nausea and Unspecified     Chest pain, nausea       REVIEW OF SYSTEMS  See HPI for further details. Review of systems as above, otherwise all other systems are negative.     PHYSICAL EXAM  VITAL SIGNS: BP 69/30 mmHg  Pulse 71  Temp(Src) 38.2 °C (100.7 °F)  Resp 24  Ht 1.651 m (5' 5\")  Wt 92.987 kg (205 lb)  BMI 34.11 kg/m2  SpO2 74% recheck blood pressure is 81 systolic. Pulse remains in the 70s.  Constitutional: She appears tired however not toxic. She has a moist cough productive of dark sidney green phlegm.  HENT: Mucus membranes extremely dry.  Oropharynx is clear. Her head is " atraumatic.  Eyes: Pupils equally round.  No scleral icterus.   Neck: Full nontender range of motion.  Lymphatic: No cervical lymphadenopathy noted.   Cardiovascular: Regular heart rate and rhythm.  No murmurs, rubs, nor gallop appreciated.   Thorax & Lungs: Chest is nontender.  Lungs are notable for coarse rhonchi which clear somewhat with coughing.  Abdomen: Soft, with no tenderness, rebound nor guarding.  No mass, pulsatile mass, nor hepatosplenomegaly appreciated.  Skin: No purpura nor petechia noted.  Extremities/Musculoskeletal: No sign of trauma.  Calves are nontender with no cords nor edema.  No Elin's sign.  Pulses are intact all around.   Neurologic: Alert & oriented.  Strength and sensation is intact all around.  Gait is not assessed  Psychiatric: Normal affect appropriate for the clinical situation.    EKG  I interpreted this EKG myself.  This is a 12-lead study.  The rhythm is sinus with a rate of 70.  There are no ST segment nor T wave abnormalities.  Interpretation: No ST segment elevation myocardial infarction.    LABS  Labs Reviewed   LACTIC ACID - Abnormal; Notable for the following:     Lactic Acid 2.3 (*)     All other components within normal limits   CBC WITH DIFFERENTIAL - Abnormal; Notable for the following:     WBC 16.6 (*)     RBC 3.43 (*)     Hemoglobin 8.7 (*)     Hematocrit 29.4 (*)     MCH 25.4 (*)     MCHC 29.6 (*)     RDW 55.1 (*)     Neutrophils-Polys 82.60 (*)     Lymphocytes 3.50 (*)     Neutrophils (Absolute) 15.74 (*)     Lymphs (Absolute) 0.58 (*)     All other components within normal limits   COMP METABOLIC PANEL - Abnormal; Notable for the following:     Sodium 133 (*)     Potassium 6.3 (*)     Glucose 239 (*)     Bun 52 (*)     Creatinine 1.89 (*)     Calcium 8.4 (*)     Albumin 2.8 (*)     Total Protein 5.7 (*)     All other components within normal limits   ESTIMATED GFR - Abnormal; Notable for the following:     GFR If  32 (*)     GFR If Non   "American 26 (*)     All other components within normal limits   DIFFERENTIAL MANUAL - Abnormal; Notable for the following:     Bands-Stabs 12.20 (*)     All other components within normal limits   BLOOD CULTURE    Narrative:     Per Hospital Policy: Only change Specimen Src: to \"Line\" if  specified by physician order.   BLOOD CULTURE    Narrative:     Per Hospital Policy: Only change Specimen Src: to \"Line\" if  specified by physician order.   PERIPHERAL SMEAR REVIEW   PLATELET ESTIMATE   MORPHOLOGY   LACTIC ACID   URINALYSIS   URINE CULTURE(NEW)   CULTURE RESPIRATORY W/ [facility] STN         RADIOLOGY/PROCEDURES  I have reviewed the patient's film interpretations myself, and they are read out by the radiologist as:   DX-CHEST-PORTABLE (1 VIEW)   Final Result         1.  Diffuse patchy airspace opacities throughout the right lung most consistent with pneumonitis, however asymmetric pulmonary edema conceivably could have this appearance.        .    MEDICAL RECORD  I have reviewed patient's medical record and pertinent results are listed above.    COURSE & MEDICAL DECISION MAKING  I have reviewed any medical record information, laboratory studies and radiographic results as noted above.  This patient presents with feeling weak, falling. She is found to have profound hypotension which is responding initially to IV fluids. She is quite hypoxic as well. Clinically I suspect that she has pneumonia. We will treat her for community acquired pneumonia. Given the appearance of her sputum, I suspect pneumococcus. She is given supplemental oxygen. We have talked about the possibility of central line placement, intubation. Although she does not want to be maintained in a coma on a ventilator unless there is hope for coming off of it. Presently she is breathing well. Initially she was responding to fluids, but her response has plateaued in the 80s systolic. IV fluids are continued, however norepinephrine is initiated. I discussed the " patient's case with Dr. Coreas, who is seeing her presently from critical care medicine regarding the hypotension and hypoxia. Case also discussed with Dr. Castro, hospitalist, who will be admitting.    FINAL IMPRESSION  1. Sepsis, due to unspecified organism (CMS-HCC)    2. Hypoxia    3. Other specified hypotension    4. Hyperkalemia    5. Renal failure     6. Critical care time, 35 minutes, which includes obtaining history from patient and/or family/prehospital historians, examination of patient, reevaluation of patient, direction of nursing staff, and discussion with admitting and consulting physicians. It does not include any procedures.       This dictation was created using voice recognition software.    Electronically signed by: Jeremy Zaman, 3/7/2017 5:55 PM

## 2017-03-08 NOTE — CONSULTS
DATE OF SERVICE:  03/07/2017    REQUESTING PHYSICIAN:  Dr. Jeremy Zaman    CONSULTING PHYSICIAN:  Angel Coreas MD    TYPE OF CONSULTATION:  Pulmonary medicine and critical care medicine.    REASON FOR CONSULTATION:  Evaluation and management of sepsis with shock and   pneumonia.    CHIEF COMPLAINT:  Cough, chest congestion with weakness.    HISTORY OF PRESENT ILLNESS:  I was kindly asked by Dr. Jeremy Zaman to see and   evaluate this lady regarding the above problems.  This is a 66-year-old lady   who has been feeling congested for the last few days.  She lives in [place].    She uses a wheelchair intermittently to get around.  She has chronic back   pain.  Today, she fell at home on 2 occasions because she was weak.  She has a   cough with chest congestion and is producing some cream-colored sputum.  She   denies hemoptysis.  She denies any chest pain or chest pressure.  She has no   nausea, vomiting or diarrhea.  She has had poor oral intake today because she   could not get food or water.  She denies dysuria, urgency, frequency or   hematuria.  She presented to the emergency room.  She is on vasopressor   support.  She was hypotensive and has received over 4 liters of intravenous   crystalloid solution.    CURRENT MEDICATIONS:  Albuterol as needed, cyclobenzaprine 10 mg as needed,   duloxetine 60 mg a day, escitalopram 10 mg a day, fluticasone/salmeterol   500/50 one puff twice a day, gabapentin 600 mg 3 times a day, Lantus 20 units   in the evening, lactulose as needed, levothyroxine 75 mcg a day, lisinopril 40   mg a day, montelukast 10 mg a day, multivitamin daily, omeprazole 20 mg a   day, oxycodone 1-2 tablets as needed, simvastatin 10 mg a day, trazodone 100   mg 3 times a day.    ALLERGIES:  TO KEFLEX, WHICH CAUSES A RASH; CODEINE, WHICH CAUSES STOMACH   PAIN; LEMON AND LIME; LYRICA, WHICH CAUSES NAUSEA AND DIZZINESS; SUDAFED   CAUSED NAUSEA and chest pain.    PAST SURGICAL HISTORY:  She has had  a hysterectomy, left knee surgery and a   left breast lumpectomy.    ILLNESSES:  Chronic hypoxemic respiratory failure.  She uses 2-1/2 liters of   oxygen at night.  She also uses it as needed.  She does not use it all the   time, she admits.  Chronic obstructive pulmonary disease, diabetes mellitus   type 2, lumbar spine disease, systemic arterial hypertension, obesity,   gastroesophageal reflux disease, obstructive sleep apnea, she is not on   treatment.  Chronic kidney disease, hepatitis C, hypothyroidism, chronic   diastolic heart failure with grade II diastolic dysfunction and pulmonary   hypertension with right ventricular systolic pressure of 54 mmHg.    SOCIAL HISTORY:  She smokes a pack of cigarettes a day.  She just recently   quit.  She smoked for 50 years.  She does not abuse alcohol.    FAMILY HISTORY:  Noncontributory.    REVIEW OF SYSTEMS:  The A and CMS system review does not reveal any   additional significant positive findings.    PHYSICAL EXAMINATION:  VITAL SIGNS:  Her temperature is 100.7, her blood pressure 69/30, heart rate   79, respiratory rate is 28.  GENERAL:  She is a tachypneic lady.  HEENT:  Her head is normocephalic, atraumatic.  Her sinuses are nontender.    Nares patent.  Oropharynx with dry mucous membranes.  NECK:  Trachea midline, supple.  CHEST:  Symmetrical.  HEART:  Regular rhythm.  LUNGS:  There are few crackles bilaterally, right greater than left.  There is   no wheezing.  She has good air movement.  She is tachypneic.  ABDOMEN:  Obese, soft, nondistended, nontender, no masses.  EXTREMITIES:  No clubbing, cyanosis or edema.  NEUROLOGIC:  She is awake, alert, and fully oriented.  She answers questions   appropriately.  She is moving all extremities.    DIAGNOSTIC DATA:  Her white blood cell count is 16,600, hemoglobin 8.7,   hematocrit 29.4, platelet count 221,000.  Sodium 133, potassium 6.3, chloride   104, CO2 22, BUN 52, creatinine 1.89, glucose 239.  Her albumin is 2.8.   Her   lactic acid is 2.3.  Her chest x-ray shows new increased opacities throughout   the right lung with some faint opacification in the left lower lobe.  This is   all new compared to 08/08/2016.    IMPRESSION:  1.  Acute on chronic hypoxemic respiratory failure.  2.  Sepsis with shock.  She is on vasopressors.  She has received over 4   liters of intravenous crystalloid solution and remains hypotensive.  The   source of her sepsis appears to be pneumonia.  3.  Community-acquired pneumonia.  4.  Acute on chronic kidney disease.  5.  Hyperkalemia.  6.  Hyponatremia.  7.  Anemia.  8.  Chronic obstructive pulmonary disease.  There is no bronchospasm on exam.  9.  Chronic diastolic heart failure with grade II diastolic dysfunction.  10.  Pulmonary hypertension with right ventricular systolic pressure of 54   mmHg.  11.  History of obstructive sleep apnea.  She is not on treatment.  12.  Diabetes mellitus type 2.  13.  Chronic back pain.  14.  History of systemic arterial hypertension.  She is currently hypotensive.  15.  Obesity.  16.  Gastroesophageal reflux disease.  17.  History of hepatitis C.  18.  Hypothyroidism.    PLAN  AND MEDICAL DECISION MAKING:  This lady is critically ill.  She is going   to be admitted to the intensive care unit.  A central venous catheter will be   placed.  She has been placed on the sepsis protocol.  She has received the   appropriate amount of intravenous crystalloid solution, 30 mL per kilogram.    She is receiving additional intravenous crystalloid solution.  We will monitor   her central venous pressure.  We will culture her blood, sputum, and urine.    She has received a dose of levofloxacin here in the emergency room.  I am   going to place her on intravenous ampicillin/sulbactam as well as azithromycin   pending culture results.  We will monitor her electrolytes and renal function   very closely.  She has acute on chronic kidney failure.  Her blood pressure   will be supported  appropriately with vasopressors as necessary.  At the   current time, her prognosis is guarded and she is critically ill.    I spent 45 minutes providing direct critical care services at the bedside.    There has been no time overlap.  The time spent excludes the time spent   performing procedures (51889).    The case has been reviewed with Dr. Gaines, nursing, and respiratory therapy.    Thank you Dr. Gaines for allowing us to participate in the care of this lady.    We will continue to follow her with great interest.       ____________________________________     MD OLIVE SANTOS / DMITRY    DD:  03/07/2017 20:20:20  DT:  03/07/2017 20:58:07    D#:  [id number]  Job#:  [id number]    cc: RENETTA GAINES MD

## 2017-03-08 NOTE — CARE PLAN
Problem: Communication  Goal: The ability to communicate needs accurately and effectively will improve  Outcome: PROGRESSING AS EXPECTED  Calls appropriately, communicates needs effectively    Problem: Pain Management  Goal: Pain level will decrease to patient’s comfort goal  Outcome: PROGRESSING AS EXPECTED  Pt home medication added

## 2017-03-08 NOTE — PROCEDURES
DATE OF SERVICE:  03/07/2017    TITLE OF THE PROCEDURE:  Central venous catheter placement.    INDICATION FOR THE PROCEDURE:  Hypotension requiring vasopressor support.    NARRATIVE:  The right neck was prepped with chlorhexidine and draped in the   usual sterile fashion.  Xylocaine 1% solution was used for topical anesthesia.    A triple lumen central venous catheter was easily placed into the right   internal jugular vein under ultrasound guidance on the first attempt using the   technique described by Ginny without difficulty or apparent complication.    The line was sutured into place and a sterile dressing was placed over the   line.  All 3 ports flushed and returned venous blood easily.  The patient   tolerated the procedure quite nicely.  No complications are apparent.    Post-procedure chest x-ray shows the line in appropriate position.       ____________________________________     MD OLIVE SANTOS / DMITRY    DD:  03/07/2017 21:46:21  DT:  03/07/2017 21:52:56    D#:  [id number]  Job#:  [id number]

## 2017-03-08 NOTE — ED NOTES
Pt up to [facility] at this time.   ICU nurse here to take over care.  Report given to Isis ISAACS. Pt moved to trauma [place] 2

## 2017-03-08 NOTE — PROGRESS NOTES
Pulmonary Critical Care Progress Note        Chief Complaint: SOB    History of Present Illness: 66 y.o. female who has been feeling congested for the last few days. She lives in [place].  She uses a wheelchair intermittently to get around.  She has chronic back pain.  Today, she fell at home on 2 occasions because she was weak.  She has a cough with chest congestion and is producing some cream-colored sputum.  She denies hemoptysis.  She denies any chest pain or chest pressure.  She has no nausea, vomiting or diarrhea.  She has had poor oral intake today because she could not get food or water.  She denies dysuria, urgency, frequency or hematuria.  She presented to the emergency room.  She is on vasopressor  support.  She was hypotensive and has received over 4 liters of intravenous  crystalloid solution.     ROS:  Respiratory: positive cough and negative shortness of breath, Cardiac: negative, GI: negative.  All other systems negative.    Interval Events:  24 hour interval history reviewed    - remains on levophed gtt   - improving WBC, lactic acid and HAN    PFSH:  No change.    Respiratory:   4 lpm n/c,  mL  Pulse Oximetry: 96 %          Exam: unlabored respirations, no intercostal retractions or accessory muscle use and rhonchi right > left  ImagingCXR  I have personally reviewed the chest x-ray my impression is  unchanged diffuse R sided infiltrates        Invalid input(s): [id number]    HemoDynamics:  Pulse: 74, Heart Rate (Monitored): 74  Blood Pressure : (!) 69/30 mmHg, NIBP: 123/56 mmHg  CVP (mm Hg): (!) 14 MM HG NE @ 6 mcg, CVP 13-18    Exam: regular rate and rhythm, regular rhythm (Sinus)  Imaging: Available data reviewed        Neuro:  GCS Total Jackson Coma Score: 15       Exam: no focal deficits noted mental status intact oriented for age x3  Imaging: Available data reviewed    Fluids:  Intake/Output       03/06/17 0700 - 03/07/17 0659 (Not Admitted) 03/07/17 0700 - 03/08/17 0659 03/08/17  700 - 1759       Total  Total  Total       Intake    P.O.  --  -- --  --  700 700  --  -- --    P.O. -- -- -- -- 700 700 -- -- --    I.V.  --  -- --  --  225.9 225.9  --  -- --    Norepinephrine Volume -- -- -- -- 225.9 225.9 -- -- --    Total Intake -- -- -- -- 925.9 925.9 -- -- --       Output    Urine  --  -- --  --  1450 1450  --  -- --    Indwelling Cathether -- -- -- -- 1450 1450 -- -- --    Void (ml) -- -- -- -- 0 0 -- -- --    Total Output -- -- -- -- 1450 1450 -- -- --       Net I/O     -- -- -- -- -524.1 -524.1 -- -- --        Weight: 104.9 kg (231 lb 4.2 oz)  Recent Labs      17   0520   SODIUM  133*  137   POTASSIUM  6.3*  4.9   CHLORIDE  104  111   CO2  22  21   BUN  52*  36*   CREATININE  1.89*  1.28   MAGNESIUM  1.9   --    CALCIUM  8.4*  8.8       GI/Nutrition:  Exam: abdomen is soft and non-tender, normal active bowel sounds  Imaging: Available data reviewed  taking PO  Liver Function  Recent Labs      17   17217   0520   ALTSGPT  10   --    ASTSGOT  17   --    ALKPHOSPHAT  63   --    TBILIRUBIN  0.6   --    GLUCOSE  239*  149*       Heme:  Recent Labs      17   RBC  3.43*   HEMOGLOBIN  8.7*   HEMATOCRIT  29.4*   PLATELETCT  221   PROTHROMBTM  15.8*   APTT  37.1*   INR  1.22*       Infectious Disease:  Temp  Av.2 °C (99 °F)  Min: 36.9 °C (98.4 °F)  Max: 38.2 °C (100.7 °F)  Micro: reviewed  Recent Labs      17   1720   WBC  16.6*   NEUTSPOLYS  82.60*   LYMPHOCYTES  3.50*   MONOCYTES  0.80   EOSINOPHILS  0.00   BASOPHILS  0.00   ASTSGOT  17   ALTSGPT  10   ALKPHOSPHAT  63   TBILIRUBIN  0.6     Current Facility-Administered Medications   Medication Dose Frequency Provider Last Rate Last Dose   • tiotropium (SPIRIVA) 18 MCG inhalation capsule 1 Cap  1 Cap QDAILY (RT) Eugene Castro M.D.   Stopped at 17 0215   • norepinephrine (LEVOPHED) 8 mg in  mL Infusion  0-30  mcg/min Continuous Angel Lema M.D. 13.1 mL/hr at 03/08/17 0400 7 mcg/min at 03/08/17 0400    And   • vasopressin (VASOSTRICT) 20 Units in  mL Infusion  0.03 Units/min Continuous Angel Lema M.D.   Stopped at 03/07/17 2030   • ampicillin/sulbactam (UNASYN) 3 g in  mL IVPB  3 g Q6HRS Angel Lmea M.D.   Stopped at 03/08/17 0429   • azithromycin (ZITHROMAX) 500 mg in D5W 250 mL IVPB  500 mg Q24HRS Angel Lema M.D.   Stopped at 03/07/17 2315   • NS infusion 1,000 mL  1,000 mL Once Angel Lema M.D.       • senna-docusate (PERICOLACE or SENOKOT S) 8.6-50 MG per tablet 2 Tab  2 Tab BID Eugene Castro M.D.   2 Tab at 03/07/17 2100    And   • polyethylene glycol/lytes (MIRALAX) PACKET 1 Packet  1 Packet QDAY PRN Eugene Castro M.D.        And   • magnesium hydroxide (MILK OF MAGNESIA) suspension 30 mL  30 mL QDAY PRN Eugene Castro M.D.        And   • bisacodyl (DULCOLAX) suppository 10 mg  10 mg QDAY PRN Eugene Castro M.D.       • Respiratory Care per Protocol   Continuous RT Eugene Castro M.D.       • NS (BOLUS) infusion 500 mL  500 mL Once PRN Eugene Castro M.D.       • NS infusion   Continuous Eugene Castro M.D. 125 mL/hr at 03/08/17 0001     • heparin injection 5,000 Units  5,000 Units Q8HRS Eugene Castro M.D.   5,000 Units at 03/08/17 0547   • insulin lispro (HUMALOG) injection 2-9 Units  2-9 Units 4X/DAY RUSS Castro M.D.   Stopped at 03/08/17 0013   • glucose 4 g chewable tablet 16 g  16 g Q15 MIN PRN Eugene Castro M.D.        And   • dextrose 50% (D50W) injection 25 mL  25 mL Q15 MIN PRN Eugene Castro M.D.       • ondansetron (ZOFRAN) syringe/vial injection 4 mg  4 mg Q4HRS PRN Eugene Castro M.D.       • ondansetron (ZOFRAN ODT) dispertab 4 mg  4 mg Q4HRS PRN Eugene Castro M.D.       • famotidine (PEPCID) tablet 20 mg  20 mg Q24HRS Eugene Castro M.D.        Or   • famotidine (PEPCID) injection 20 mg  20 mg Q24HRS Eugene Castro M.D.       • nicotine (NICODERM) 21  MG/24HR 21 mg  21 mg Daily-0600 Eugene Castro M.D.   21 mg at 03/08/17 0547    And   • nicotine polacrilex (NICORETTE) 2 MG piece 2 mg  2 mg Q HOUR PRN Eugene Castro M.D.       • duloxetine (CYMBALTA) capsule 60 mg  60 mg DAILY Eugene Castro M.D.       • escitalopram (LEXAPRO) tablet 10 mg  10 mg DAILY Eugene Castro M.D.       • insulin glargine (LANTUS) injection 20 Units  20 Units Nightly Eugene Castro M.D.   20 Units at 03/08/17 0114   • levothyroxine (SYNTHROID) tablet 75 mcg  75 mcg AM ES Eugene Castro M.D.   75 mcg at 03/08/17 0622   • omeprazole (PRILOSEC) capsule 20 mg  20 mg DAILY Eugene Castro M.D.       • simvastatin (ZOCOR) tablet 10 mg  10 mg Q EVENING Eugene Castro M.D.       • ipratropium-albuterol (DUONEB) nebulizer solution 3 mL  3 mL Q4HRS (RT) Eugene Castro M.D.   3 mL at 03/08/17 0350   • guaifenesin LA (MUCINEX) tablet 600 mg  600 mg Q12HRS Eugene Castro M.D.       • budesonide-formoterol (SYMBICORT) 160-4.5 MCG/ACT inhaler 2 Puff  2 Puff BID Sukumar Rouse, PHARMD   2 Puff at 03/07/17 2100     Last reviewed on 3/7/2017  6:53 PM by Angeles Odom FAUSTO    Quality  Measures:  Medications reviewed, Labs reviewed and Radiology images reviewed  Mckeon catheter: Critically Ill - Requiring Accurate Measurement of Urinary Output and Strict Intake and Output During Sepisis or Shock  Central line in place: Need for access, Vasopressors, Sepsis and Shock    DVT Prophylaxis: Heparin  DVT prophylaxis - mechanical: SCDs  Ulcer prophylaxis: Yes  Antibiotics: Treating active infection/contamination beyond 24 hours perioperative coverage  Assessed for rehab: Patient unable to tolerate rehabilitation therapeutic regimen    Assessment/Plan:  Acute on chronic (baseline 2-3 lpm n/c) hypoxemic respiratory failure   - O2/RT protocols, encourage IS/PEP   - aspiration precautions  Septic shock secondary to community-acquired vs aspiration PNA   - cont abx, f/u cultures   - levo gtt to goal MAP>65, CVP at goal  Acute on chronic kidney  disease - improving   - avoid nephrotoxins  Hyperkalemia.  Hyponatremia.  Anemia.  COPD without bronchospasm - BDs  Chronic diastolic heart failure with grade II diastolic dysfunction - compensated  Pulmonary hypertension with right ventricular systolic pressure of 54 mmHg.  History of obstructive sleep apnea.  She is not on treatment.  Diabetes mellitus type 2.  Chronic back pain.  History of systemic arterial hypertension.  She is currently hypotensive.  Obesity.  Gastroesophageal reflux disease.  History of hepatitis C.  Hypothyroidism.  Prophylaxis, diet    Discussed patient condition and risk of morbidity and/or mortality with RN, RT, Pharmacy, Charge nurse / hot rounds, QA team, Patient and hospitalist.    The patient remains critically ill.  Critical care time = 34 minutes in directly providing and coordinating critical care and extensive data review.  No time overlap and excludes procedures.

## 2017-03-08 NOTE — ED NOTES
Room set up for central line placement for Dr. Helton.  Pt with concerns about doing the central line in ER.  Explained to pt that the area will be sterile field.  Pt requesting to talk to the MD.  Md notified.

## 2017-03-08 NOTE — PROGRESS NOTES
Hospital Medicine Progress Note, Adult, Complex               Author: Angel Menjivar Date & Time created: 3/8/2017  8:00 AM     66yof here with pneumonia/resp failure and sepsis    Interval History:  Admitted over night after multiple falls. WC bound though?  tremuloous  A/o  Nsr with occ pvc  On pressors for hypotension  Good pulses  Cvp 13-18  usuqally wears 2-3L at home  tmax 99.1  750 on IS today pep and nebs q4  Takes 30mg oxy bid and neurontin      Review of Systems:  Review of Systems   Constitutional: Positive for malaise/fatigue. Negative for fever and chills.   HENT: Negative for sore throat.    Eyes: Negative for blurred vision and pain.   Respiratory: Positive for cough and shortness of breath.    Cardiovascular: Negative for chest pain and palpitations.   Gastrointestinal: Negative for nausea, vomiting and abdominal pain.   Genitourinary: Negative for dysuria and urgency.   Musculoskeletal: Negative for back pain and neck pain.   Skin: Negative for itching and rash.   Neurological: Negative for dizziness, tingling and headaches.   Psychiatric/Behavioral: Negative for depression. The patient does not have insomnia.    All other systems reviewed and are negative.      Physical Exam:  Physical Exam   Constitutional: She is oriented to person, place, and time. She appears well-developed and well-nourished. No distress.   HENT:   Right Ear: External ear normal.   Left Ear: External ear normal.   Nose: Nose normal.   Eyes: Conjunctivae are normal. Right eye exhibits no discharge. Left eye exhibits no discharge.   Neck: No JVD present.   Cardiovascular: Regular rhythm and normal heart sounds.    No murmur heard.  Pulmonary/Chest: Effort normal. No stridor. No respiratory distress. She has no wheezes. She has rales.   Abdominal: Soft. Bowel sounds are normal. She exhibits no distension. There is no tenderness.   obese   Musculoskeletal: She exhibits edema. She exhibits no tenderness.   Neurological: She is  alert and oriented to person, place, and time.   Skin: Skin is warm and dry. She is not diaphoretic. No erythema.   Psychiatric: She has a normal mood and affect. Her behavior is normal.   Nursing note and vitals reviewed.      Labs:        Invalid input(s): [id number]      Recent Labs      17   17217   0520   SODIUM  133*  137   POTASSIUM  6.3*  4.9   CHLORIDE  104  111   CO2  22  21   BUN  52*  36*   CREATININE  1.89*  1.28   MAGNESIUM  1.9   --    CALCIUM  8.4*  8.8     Recent Labs      17   0520   ALTSGPT  10   --    ASTSGOT  17   --    ALKPHOSPHAT  63   --    TBILIRUBIN  0.6   --    GLUCOSE  239*  149*     Recent Labs      17   0520   RBC  3.43*  3.44*   HEMOGLOBIN  8.7*  8.6*   HEMATOCRIT  29.4*  29.9*   PLATELETCT  221  186   PROTHROMBTM  15.8*   --    APTT  37.1*   --    INR  1.22*   --      Recent Labs      17   0520   WBC  16.6*  13.6*   NEUTSPOLYS  82.60*   --    LYMPHOCYTES  3.50*   --    MONOCYTES  0.80   --    EOSINOPHILS  0.00   --    BASOPHILS  0.00   --    ASTSGOT  17   --    ALTSGPT  10   --    ALKPHOSPHAT  63   --    TBILIRUBIN  0.6   --            Hemodynamics:  Temp (24hrs), Av.2 °C (99 °F), Min:36.9 °C (98.4 °F), Max:38.2 °C (100.7 °F)  Temperature: 37.2 °C (98.9 °F)  Pulse  Av.5  Min: 58  Max: 80Heart Rate (Monitored): 80  Blood Pressure : (!) 69/30 mmHg, NIBP: 123/56 mmHg  CVP (mm Hg): (!) 14 MM HG  Respiratory:    Respiration: (!) 27, Pulse Oximetry: 97 %, O2 Daily Delivery Respiratory : Silicone Nasal Cannula     Given By:: Mouthpiece, #MDI/DPI Given: MDI/DPI x 2, PEP/CPT Method: Positive Airway Pressure Device, Work Of Breathing / Effort: Mild  RUL Breath Sounds: Coarse Crackles, RML Breath Sounds: Coarse Crackles, RLL Breath Sounds: Diminished, BIRD Breath Sounds: Coarse Crackles, LLL Breath Sounds: Diminished  Fluids:    Intake/Output Summary (Last 24 hours) at 17 0800  Last data filed  at 03/08/17 0600   Gross per 24 hour   Intake 925.91 ml   Output   1450 ml   Net -524.09 ml     Weight: 104.9 kg (231 lb 4.2 oz)  GI/Nutrition:  Orders Placed This Encounter   Procedures   • Diet Order     Standing Status: Standing      Number of Occurrences: 1      Standing Expiration Date:      Order Specific Question:  Diet:     Answer:  Diabetic [3]     Medical Decision Making, by Problem:  Active Hospital Problems    Diagnosis   • DM type 2, uncontrolled, with renal complications (CMS-HCC) [E11.29, E11.65]- ssi and trend.    • Morbid obesity (CMS-HCC) [E66.01]   • Hyponatremia [E87.1]- iv fluids. Suspect hypovolemia 2/2 pneumonia   • Hyperkalemia [E87.5]- trend close. Improving. Iv fluids.    • Acute on chronic renal failure (CMS-HCC) [N17.9, N18.9]- iv fluids. Trend lytes and creat.    • Acute on chronic respiratory failure (CMS-HCC) [J96.20]- 2/2 pneumonia. Rt protocol treat pneumonia. o2 as needed.    • Severe sepsis with septic shock (CMS-HCC) [A41.9, R65.21]- 2/2 pneumonia with respiratory dysfunction. Treat pneumonia. Iv abx, iv fluids. Titrate pressors as tolerated. Trend lytes, lactic and correct prn.    • Anemia [D64.9]- check fe studies.    • CAP (community acquired pneumonia) [J18.9]- iv abx, pulm toilet, rt protocol, f/u on cultures.        • Gastroesophageal reflux disease with esophagitis [K21.0]- ppi   • Chronic constipation [K59.09]- bowel protocol.    • Chronic respiratory failure (CMS-HCC) [J96.10]- on 2L at home.    • EDITH (obstructive sleep apnea) [G47.33]   • Chronic pain syndrome [G89.4]   • Vitamin D deficiency [E55.9]   • Hypothyroidism [E03.9]- cont meds.    • COPD (chronic obstructive pulmonary disease) (CMS-HCC) [J44.9]- no obvious flare. Cont inhalers. Not on steroids. Rt protocol.     • HTN (hypertension) [I10]- benign   Chronic diastolic CHF- grade2- watch for worsening respiratory failure with fluids.   Tenia corporis- Nystatin  Discussed plan with RN  Critical care time 35min with  active management of shock.         EKG reviewed, Labs reviewed, Medications reviewed and Radiology images reviewed  Mckeon catheter: Critically Ill - Requiring Accurate Measurement of Urinary Output  Central line in place: Concentrated IV drugs    DVT Prophylaxis: Heparin    Ulcer prophylaxis: Yes  Antibiotics: Treating active infection/contamination beyond 24 hours perioperative coverage  Assessed for rehab: Patient was assess for and/or received rehabilitation services during this hospitalization

## 2017-03-08 NOTE — ED NOTES
"Pt BIB [facility], c/o \" feeling tired, and frequent falls today'\" , pt states she has fallen twice, pt c/o posterior head pain , pt is found to be extremely hypotensive , BP checked in both arms and with manual BP cuff , pt has triggered sepsis, 2 IV's in place, labs / blood cx's x 2 drawn and sent    "

## 2017-03-08 NOTE — DISCHARGE PLANNING
Care Transition Team Assessment  Met with pt for CTT assessment. Pt lives alone in single story home in [place], has dog and bird. (neighbors caring for animals.) Pt's mobility has been limited due to spinal stenosis, uses w/c, has grab-bars in shower, shower bench, and home 02 that she uses only at night. Has been to SNF in the past. Discharge plans currently undermined. CTT will continue to follow.  Information Source  Orientation : Oriented x 4  Information Given By: Patient  Who is responsible for making decisions for patient? : Patient         Elopement Risk  Legal Hold: No  Ambulatory or Self Mobile in Wheelchair: No-Not an Elopement Risk  Elopement Risk: Not at Risk for Elopement    Interdisciplinary Discharge Planning  Primary Care Physician: Dr. Lawson in [place]  Lives with - Patient's Self Care Capacity: Alone and Able to Care For Self  Support Systems: Friends / Neighbors, Family Member(s)  Housing / Facility: [address]  Do You Take your Prescribed Medications Regularly: Yes  Mobility Issues: Yes  Prior Services: None  Durable Medical Equipment: Home Oxygen, Walker, Other - Specify (w/c)    Discharge Preparedness  What is your plan after discharge?: Uncertain - pending medical team collaboration  What are your discharge supports?:  (friends/neighbors)  Prior Functional Level: Uses Wheelchair, Uses Walker, Independent with Activities of Daily Living, Independent with Medication Management  Difficulity with IADLs:  (house keeping)    Functional Assesment  Prior Functional Level: Uses Wheelchair, Uses Walker, Independent with Activities of Daily Living, Independent with Medication Management    Finances  Prescription Coverage: Yes    Vision / Hearing Impairment  Right Eye Vision: Wears Glasses  Left Eye Vision: Wears Glasses         Advance Directive  Advance Directive?: DPOA for Health Care    Domestic Abuse  Have you ever been the victim of abuse or violence?: No              Anticipated Discharge  Information  Anticipated discharge disposition: Discharge needs currently unknown

## 2017-03-09 ENCOUNTER — APPOINTMENT (OUTPATIENT)
Dept: RADIOLOGY | Facility: MEDICAL CENTER | Age: 67
DRG: 871 | End: 2017-03-09
Attending: INTERNAL MEDICINE
Payer: MEDICARE

## 2017-03-09 LAB
ANION GAP SERPL CALC-SCNC: 9 MMOL/L (ref 0–11.9)
BACTERIA UR CULT: ABNORMAL
BACTERIA UR CULT: ABNORMAL
BASOPHILS # BLD AUTO: 0.3 % (ref 0–1.8)
BASOPHILS # BLD: 0.03 K/UL (ref 0–0.12)
BUN SERPL-MCNC: 25 MG/DL (ref 8–22)
CALCIUM SERPL-MCNC: 8.4 MG/DL (ref 8.5–10.5)
CHLORIDE SERPL-SCNC: 107 MMOL/L (ref 96–112)
CO2 SERPL-SCNC: 21 MMOL/L (ref 20–33)
CREAT SERPL-MCNC: 1.17 MG/DL (ref 0.5–1.4)
EOSINOPHIL # BLD AUTO: 0.27 K/UL (ref 0–0.51)
EOSINOPHIL NFR BLD: 2.5 % (ref 0–6.9)
ERYTHROCYTE [DISTWIDTH] IN BLOOD BY AUTOMATED COUNT: 56.7 FL (ref 35.9–50)
GFR SERPL CREATININE-BSD FRML MDRD: 46 ML/MIN/1.73 M 2
GLUCOSE BLD-MCNC: 104 MG/DL (ref 65–99)
GLUCOSE BLD-MCNC: 109 MG/DL (ref 65–99)
GLUCOSE BLD-MCNC: 121 MG/DL (ref 65–99)
GLUCOSE BLD-MCNC: 86 MG/DL (ref 65–99)
GLUCOSE SERPL-MCNC: 83 MG/DL (ref 65–99)
HCT VFR BLD AUTO: 25.6 % (ref 37–47)
HGB BLD-MCNC: 7.4 G/DL (ref 12–16)
IMM GRANULOCYTES # BLD AUTO: 0.06 K/UL (ref 0–0.11)
IMM GRANULOCYTES NFR BLD AUTO: 0.6 % (ref 0–0.9)
IRON SATN MFR SERPL: ABNORMAL % (ref 15–55)
IRON SERPL-MCNC: <10 UG/DL (ref 40–170)
LYMPHOCYTES # BLD AUTO: 0.93 K/UL (ref 1–4.8)
LYMPHOCYTES NFR BLD: 8.6 % (ref 22–41)
MCH RBC QN AUTO: 24.7 PG (ref 27–33)
MCHC RBC AUTO-ENTMCNC: 28.9 G/DL (ref 33.6–35)
MCV RBC AUTO: 85.6 FL (ref 81.4–97.8)
MONOCYTES # BLD AUTO: 0.67 K/UL (ref 0–0.85)
MONOCYTES NFR BLD AUTO: 6.2 % (ref 0–13.4)
NEUTROPHILS # BLD AUTO: 8.81 K/UL (ref 2–7.15)
NEUTROPHILS NFR BLD: 81.8 % (ref 44–72)
NRBC # BLD AUTO: 0 K/UL
NRBC BLD AUTO-RTO: 0 /100 WBC
PLATELET # BLD AUTO: 162 K/UL (ref 164–446)
PMV BLD AUTO: 9.4 FL (ref 9–12.9)
POTASSIUM SERPL-SCNC: 4.4 MMOL/L (ref 3.6–5.5)
RBC # BLD AUTO: 2.99 M/UL (ref 4.2–5.4)
SIGNIFICANT IND 70042: ABNORMAL
SITE SITE: ABNORMAL
SODIUM SERPL-SCNC: 137 MMOL/L (ref 135–145)
SOURCE SOURCE: ABNORMAL
TIBC SERPL-MCNC: 248 UG/DL (ref 250–450)
WBC # BLD AUTO: 10.8 K/UL (ref 4.8–10.8)

## 2017-03-09 PROCEDURE — 71010 DX-CHEST-PORTABLE (1 VIEW): CPT

## 2017-03-09 PROCEDURE — 85025 COMPLETE CBC W/AUTO DIFF WBC: CPT

## 2017-03-09 PROCEDURE — A9270 NON-COVERED ITEM OR SERVICE: HCPCS | Performed by: HOSPITALIST

## 2017-03-09 PROCEDURE — 94668 MNPJ CHEST WALL SBSQ: CPT

## 2017-03-09 PROCEDURE — 700101 HCHG RX REV CODE 250: Performed by: INTERNAL MEDICINE

## 2017-03-09 PROCEDURE — 700105 HCHG RX REV CODE 258: Performed by: HOSPITALIST

## 2017-03-09 PROCEDURE — A9270 NON-COVERED ITEM OR SERVICE: HCPCS | Performed by: INTERNAL MEDICINE

## 2017-03-09 PROCEDURE — 99233 SBSQ HOSP IP/OBS HIGH 50: CPT | Performed by: HOSPITALIST

## 2017-03-09 PROCEDURE — 700102 HCHG RX REV CODE 250 W/ 637 OVERRIDE(OP): Performed by: HOSPITALIST

## 2017-03-09 PROCEDURE — 83550 IRON BINDING TEST: CPT

## 2017-03-09 PROCEDURE — 94640 AIRWAY INHALATION TREATMENT: CPT

## 2017-03-09 PROCEDURE — 99233 SBSQ HOSP IP/OBS HIGH 50: CPT | Performed by: INTERNAL MEDICINE

## 2017-03-09 PROCEDURE — 700111 HCHG RX REV CODE 636 W/ 250 OVERRIDE (IP): Performed by: INTERNAL MEDICINE

## 2017-03-09 PROCEDURE — 700111 HCHG RX REV CODE 636 W/ 250 OVERRIDE (IP): Performed by: HOSPITALIST

## 2017-03-09 PROCEDURE — 700105 HCHG RX REV CODE 258: Performed by: INTERNAL MEDICINE

## 2017-03-09 PROCEDURE — A6250 SKIN SEAL PROTECT MOISTURIZR: HCPCS | Performed by: INTERNAL MEDICINE

## 2017-03-09 PROCEDURE — 82962 GLUCOSE BLOOD TEST: CPT | Mod: 91

## 2017-03-09 PROCEDURE — 83540 ASSAY OF IRON: CPT

## 2017-03-09 PROCEDURE — 700102 HCHG RX REV CODE 250 W/ 637 OVERRIDE(OP): Performed by: INTERNAL MEDICINE

## 2017-03-09 PROCEDURE — 80048 BASIC METABOLIC PNL TOTAL CA: CPT

## 2017-03-09 PROCEDURE — 770020 HCHG ROOM/CARE - TELE (206)

## 2017-03-09 RX ORDER — FERROUS SULFATE 325(65) MG
325 TABLET ORAL 2 TIMES DAILY WITH MEALS
Status: DISCONTINUED | OUTPATIENT
Start: 2017-03-09 | End: 2017-03-17 | Stop reason: HOSPADM

## 2017-03-09 RX ORDER — MORPHINE SULFATE 4 MG/ML
1-4 INJECTION, SOLUTION INTRAMUSCULAR; INTRAVENOUS EVERY 4 HOURS PRN
Status: DISCONTINUED | OUTPATIENT
Start: 2017-03-09 | End: 2017-03-17 | Stop reason: HOSPADM

## 2017-03-09 RX ADMIN — MORPHINE SULFATE 2 MG: 4 INJECTION INTRAVENOUS at 14:21

## 2017-03-09 RX ADMIN — LEVOTHYROXINE SODIUM 75 MCG: 75 TABLET ORAL at 06:40

## 2017-03-09 RX ADMIN — AMPICILLIN SODIUM AND SULBACTAM SODIUM 3 G: 2; 1 INJECTION, POWDER, FOR SOLUTION INTRAMUSCULAR; INTRAVENOUS at 20:25

## 2017-03-09 RX ADMIN — NYSTATIN 1500000 UNITS: 100000 POWDER TOPICAL at 15:37

## 2017-03-09 RX ADMIN — NYSTATIN 1500000 UNITS: 100000 POWDER TOPICAL at 20:45

## 2017-03-09 RX ADMIN — IPRATROPIUM BROMIDE AND ALBUTEROL SULFATE 3 ML: .5; 3 SOLUTION RESPIRATORY (INHALATION) at 14:41

## 2017-03-09 RX ADMIN — NYSTATIN 1500000 UNITS: 100000 POWDER TOPICAL at 09:42

## 2017-03-09 RX ADMIN — GABAPENTIN 600 MG: 300 CAPSULE ORAL at 15:37

## 2017-03-09 RX ADMIN — GUAIFENESIN 600 MG: 600 TABLET, EXTENDED RELEASE ORAL at 20:26

## 2017-03-09 RX ADMIN — OXYCODONE HYDROCHLORIDE 10 MG: 5 TABLET ORAL at 23:15

## 2017-03-09 RX ADMIN — ESCITALOPRAM OXALATE 10 MG: 10 TABLET ORAL at 09:43

## 2017-03-09 RX ADMIN — IPRATROPIUM BROMIDE AND ALBUTEROL SULFATE 3 ML: .5; 3 SOLUTION RESPIRATORY (INHALATION) at 11:00

## 2017-03-09 RX ADMIN — GUAIFENESIN 600 MG: 600 TABLET, EXTENDED RELEASE ORAL at 09:42

## 2017-03-09 RX ADMIN — GABAPENTIN 600 MG: 300 CAPSULE ORAL at 05:54

## 2017-03-09 RX ADMIN — IPRATROPIUM BROMIDE AND ALBUTEROL SULFATE 3 ML: .5; 3 SOLUTION RESPIRATORY (INHALATION) at 06:03

## 2017-03-09 RX ADMIN — IPRATROPIUM BROMIDE AND ALBUTEROL SULFATE 3 ML: .5; 3 SOLUTION RESPIRATORY (INHALATION) at 19:07

## 2017-03-09 RX ADMIN — BUDESONIDE AND FORMOTEROL FUMARATE DIHYDRATE 2 PUFF: 160; 4.5 AEROSOL RESPIRATORY (INHALATION) at 19:08

## 2017-03-09 RX ADMIN — OXYCODONE HYDROCHLORIDE 10 MG: 5 TABLET ORAL at 17:07

## 2017-03-09 RX ADMIN — HEPARIN SODIUM 5000 UNITS: 5000 INJECTION, SOLUTION INTRAVENOUS; SUBCUTANEOUS at 20:25

## 2017-03-09 RX ADMIN — HEPARIN SODIUM 5000 UNITS: 5000 INJECTION, SOLUTION INTRAVENOUS; SUBCUTANEOUS at 14:12

## 2017-03-09 RX ADMIN — SODIUM CHLORIDE: 9 INJECTION, SOLUTION INTRAVENOUS at 20:26

## 2017-03-09 RX ADMIN — INSULIN GLARGINE 20 UNITS: 100 INJECTION, SOLUTION SUBCUTANEOUS at 20:34

## 2017-03-09 RX ADMIN — IPRATROPIUM BROMIDE AND ALBUTEROL SULFATE 3 ML: .5; 3 SOLUTION RESPIRATORY (INHALATION) at 02:08

## 2017-03-09 RX ADMIN — GABAPENTIN 600 MG: 300 CAPSULE ORAL at 20:25

## 2017-03-09 RX ADMIN — OXYCODONE HYDROCHLORIDE 10 MG: 5 TABLET ORAL at 09:42

## 2017-03-09 RX ADMIN — Medication 325 MG: at 11:28

## 2017-03-09 RX ADMIN — STANDARDIZED SENNA CONCENTRATE AND DOCUSATE SODIUM 2 TABLET: 8.6; 5 TABLET, FILM COATED ORAL at 09:42

## 2017-03-09 RX ADMIN — IPRATROPIUM BROMIDE AND ALBUTEROL SULFATE 3 ML: .5; 3 SOLUTION RESPIRATORY (INHALATION) at 22:53

## 2017-03-09 RX ADMIN — HEPARIN SODIUM 5000 UNITS: 5000 INJECTION, SOLUTION INTRAVENOUS; SUBCUTANEOUS at 05:54

## 2017-03-09 RX ADMIN — AZITHROMYCIN 500 MG: 500 INJECTION, POWDER, LYOPHILIZED, FOR SOLUTION INTRAVENOUS at 21:24

## 2017-03-09 RX ADMIN — TIOTROPIUM BROMIDE 1 CAPSULE: 18 CAPSULE ORAL; RESPIRATORY (INHALATION) at 06:05

## 2017-03-09 RX ADMIN — SIMVASTATIN 10 MG: 20 TABLET, FILM COATED ORAL at 20:26

## 2017-03-09 RX ADMIN — AMPICILLIN SODIUM AND SULBACTAM SODIUM 3 G: 2; 1 INJECTION, POWDER, FOR SOLUTION INTRAMUSCULAR; INTRAVENOUS at 15:37

## 2017-03-09 RX ADMIN — BUDESONIDE AND FORMOTEROL FUMARATE DIHYDRATE 2 PUFF: 160; 4.5 AEROSOL RESPIRATORY (INHALATION) at 06:04

## 2017-03-09 RX ADMIN — AMPICILLIN SODIUM AND SULBACTAM SODIUM 3 G: 2; 1 INJECTION, POWDER, FOR SOLUTION INTRAMUSCULAR; INTRAVENOUS at 09:42

## 2017-03-09 RX ADMIN — AMPICILLIN SODIUM AND SULBACTAM SODIUM 3 G: 2; 1 INJECTION, POWDER, FOR SOLUTION INTRAMUSCULAR; INTRAVENOUS at 03:57

## 2017-03-09 RX ADMIN — Medication 325 MG: at 17:07

## 2017-03-09 RX ADMIN — NICOTINE 21 MG: 21 PATCH TRANSDERMAL at 05:54

## 2017-03-09 RX ADMIN — DULOXETINE HYDROCHLORIDE 60 MG: 60 CAPSULE, DELAYED RELEASE ORAL at 09:42

## 2017-03-09 ASSESSMENT — ENCOUNTER SYMPTOMS
NECK PAIN: 0
SORE THROAT: 0
COUGH: 1
INSOMNIA: 0
BLURRED VISION: 0
PALPITATIONS: 0
EYE PAIN: 0
FEVER: 0
NAUSEA: 0
ABDOMINAL PAIN: 0
VOMITING: 0
CHILLS: 0
DIZZINESS: 0
DEPRESSION: 0
SHORTNESS OF BREATH: 1
TINGLING: 0
BACK PAIN: 0
HEADACHES: 0

## 2017-03-09 ASSESSMENT — PAIN SCALES - GENERAL
PAINLEVEL_OUTOF10: 3
PAINLEVEL_OUTOF10: 0
PAINLEVEL_OUTOF10: 4
PAINLEVEL_OUTOF10: 0
PAINLEVEL_OUTOF10: 4
PAINLEVEL_OUTOF10: 4
PAINLEVEL_OUTOF10: 2
PAINLEVEL_OUTOF10: 5
PAINLEVEL_OUTOF10: 6
PAINLEVEL_OUTOF10: 6
PAINLEVEL_OUTOF10: 5
PAINLEVEL_OUTOF10: 4
PAINLEVEL_OUTOF10: 4
PAINLEVEL_OUTOF10: 6
PAINLEVEL_OUTOF10: 3

## 2017-03-09 ASSESSMENT — PATIENT HEALTH QUESTIONNAIRE - PHQ9
1. LITTLE INTEREST OR PLEASURE IN DOING THINGS: NOT AT ALL
2. FEELING DOWN, DEPRESSED, IRRITABLE, OR HOPELESS: NOT AT ALL
SUM OF ALL RESPONSES TO PHQ9 QUESTIONS 1 AND 2: 0
SUM OF ALL RESPONSES TO PHQ QUESTIONS 1-9: 0

## 2017-03-09 NOTE — CARE PLAN
Problem: Oxygenation/Respiratory Function  Goal: Patient will Achieve/Maintain Optimum Respiratory Rate/Effort  Requiring increase in oxygen.  Pt has strong and productive cough.  Using IS and arobeka to help move secretions.  Pt does have COPD and baseline has low O2 saturations.    Problem: Hemodynamic Status  Goal: Vital Signs and Fluid Balance Management  No longer requiring levophed to keep map >65.      Problem: Mobility  Goal: Risk for activity intolerance will decrease  Pt transferred to chair with 1 person assist.  Unstable gait.

## 2017-03-09 NOTE — PROGRESS NOTES
Pt does not want to take out sykes catheter at this time. She wants to leave catheter until she is able to confidently get to the commode. She is incontinent of urine and would prefer the catheter at this time. RN provided education for increased infection risk. Pt verbalized understanding of risk and still wants the sykes catheter to remain. I discussed this with the MD.     RN will continue to educate.

## 2017-03-09 NOTE — CARE PLAN
Problem: Communication  Goal: The ability to communicate needs accurately and effectively will improve  Outcome: PROGRESSING AS EXPECTED  Communicates needs effectively, calls appropriately    Problem: Pain Management  Goal: Pain level will decrease to patient’s comfort goal  Outcome: PROGRESSING AS EXPECTED  PRN pain medication available    Problem: Skin Integrity  Goal: Skin Integrity is maintained or improved  Outcome: PROGRESSING AS EXPECTED  Nystatin powder in use for panus, mepilex in place on sacrum, q2 turns in use

## 2017-03-09 NOTE — PROGRESS NOTES
Pt transferred on ICU monitor to S100, report given to Nivia ISAACS, all belongings transferred with pt

## 2017-03-09 NOTE — CARE PLAN
Problem: Oxygenation:  Goal: Maintain adequate oxygenation dependent on patient condition  COPD pt, adjust O2 for sats between 88-92    Problem: Bronchoconstriction:  Goal: Improve in air movement and diminished wheezing  Outcome: PROGRESSING AS EXPECTED  Duo Q4, Spiriva QD, Symbicort BID    Problem: Hyperinflation:  Goal: Prevent or improve atelectasis  PEP QID and IS to increase secretion clearance.  Gave aerobika to aide in secretion clearance

## 2017-03-09 NOTE — PROGRESS NOTES
Pulmonary Critical Care Progress Note        Chief Complaint: SOB    History of Present Illness: 66 y.o. female who has been feeling congested for the last few days. She lives in [place].  She uses a wheelchair intermittently to get around.  She has chronic back pain.  Today, she fell at home on 2 occasions because she was weak.  She has a cough with chest congestion and is producing some cream-colored sputum.  She denies hemoptysis.  She denies any chest pain or chest pressure.  She has no nausea, vomiting or diarrhea.  She has had poor oral intake today because she could not get food or water.  She denies dysuria, urgency, frequency or hematuria.  She presented to the emergency room.  She is on vasopressor  support.  She was hypotensive and has received over 4 liters of intravenous  crystalloid solution.     ROS:  Respiratory: positive cough and negative shortness of breath, Cardiac: negative, GI: negative.  All other systems negative.    Interval Events:  24 hour interval history reviewed    - off levophed gtt   - improving WBC, HAN    PFSH:  No change.    Respiratory:   3 lpm n/c,  mL  Pulse Oximetry: 94 %          Exam: unlabored respirations, no intercostal retractions or accessory muscle use and rhonchi right > left  ImagingCXR  I have personally reviewed the chest x-ray my impression is  improving diffuse R sided infiltrates        Invalid input(s): [id number]    HemoDynamics:  Pulse: 71, Heart Rate (Monitored): 70  NIBP: 113/56 mmHg  CVP (mm Hg): (!) 15 MM HG  CVP 13-18    Exam: regular rate and rhythm, regular rhythm (Sinus)  Imaging: Available data reviewed        Neuro:  GCS Total [place] Coma Score: 15       Exam: no focal deficits noted mental status intact oriented for age x3  Imaging: Available data reviewed    Fluids:  Intake/Output       03/07/17 0700 - 03/08/17 0659 03/08/17 0700 - 03/09/17 0659 03/09/17 0700 - 03/10/17 0659      6019-9543 5042-4922 Total 6871-1777 9793-3894 Total  3474-4584 6885-3538 Total       Intake    P.O.  --  700 700  550  1000 1550  --  -- --    P.O. -- 700  1550 -- -- --    I.V.  --  975.9 975.9  1794  1500 3294  --  -- --    Norepinephrine Volume -- 225.9 225.9 94 0 94 -- -- --    IV Volume (MIVF) -- 750 750 -- 1000 1000 -- -- --    IV Volume (NS) -- -- -- 1500 -- 1500 -- -- --    IV Piggyback Volume (IV Piggyback) -- -- -- 200 500 700 -- -- --    Total Intake -- 1675.9 1675.9 2344 2500 4844 -- -- --       Output    Urine  --  1450 1450  1575  2225 3800  --  -- --    Indwelling Cathether -- 1450 1450 1575 2225 3800 -- -- --    Void (ml) -- 0 0 -- -- -- -- -- --    Stool  --  -- --  --  -- --  --  -- --    Number of Times Stooled -- -- -- 0 x 0 x 0 x -- -- --    Total Output -- 1450 1450 1575 2225 3800 -- -- --       Net I/O     -- 225.9 225.9  -- -- --        Weight: 108.3 kg (238 lb 12.1 oz)  Recent Labs      17   17217   0520  17   0500   SODIUM  133*  137  137   POTASSIUM  6.3*  4.9  4.4   CHLORIDE  104  111  107   CO2  22  21  21   BUN  52*  36*  25*   CREATININE  1.89*  1.28  1.17   MAGNESIUM  1.9   --    --    CALCIUM  8.4*  8.8  8.4*       GI/Nutrition:  Exam: abdomen is soft and non-tender, normal active bowel sounds  Imaging: Available data reviewed  taking PO  Liver Function  Recent Labs      17   17217   0520  17   0500   ALTSGPT  10   --    --    ASTSGOT  17   --    --    ALKPHOSPHAT  63   --    --    TBILIRUBIN  0.6   --    --    GLUCOSE  239*  149*  83       Heme:  Recent Labs      17   1720  17   0520  17   0500   RBC  3.43*  3.44*  2.99*   HEMOGLOBIN  8.7*  8.6*  7.4*   HEMATOCRIT  29.4*  29.9*  25.6*   PLATELETCT  221  186  162*   PROTHROMBTM  15.8*   --    --    APTT  37.1*   --    --    INR  1.22*   --    --    IRON   --    --   <10*   TOTIRONBC   --    --   248*       Infectious Disease:  Temp  Av.4 °C (99.4 °F)  Min: 36.9 °C (98.4 °F)  Max: 38.9 °C (102  °F)  Monitored Temp  Av.7 °C (99.8 °F)  Min: 37.7 °C (99.8 °F)  Max: 37.7 °C (99.8 °F)  Micro: reviewed. Ucx with strep  Recent Labs      17   1720  17   0520  17   0500   WBC  16.6*  13.6*  10.8   NEUTSPOLYS  82.60*   --   81.80*   LYMPHOCYTES  3.50*   --   8.60*   MONOCYTES  0.80   --   6.20   EOSINOPHILS  0.00   --   2.50   BASOPHILS  0.00   --   0.30   ASTSGOT  17   --    --    ALTSGPT  10   --    --    ALKPHOSPHAT  63   --    --    TBILIRUBIN  0.6   --    --      Current Facility-Administered Medications   Medication Dose Frequency Provider Last Rate Last Dose   • ferrous sulfate tablet 325 mg  325 mg BID WITH MEALS Angel Menjivar M.D.       • tiotropium (SPIRIVA) 18 MCG inhalation capsule 1 Cap  1 Cap QDAILY (RT) Eugene Castro M.D.   1 Cap at 17 0605   • nystatin (MYCOSTATIN) powder   TID Angel Menjivar M.D.   1,500,000 Units at 17 2105   • gabapentin (NEURONTIN) capsule 600 mg  600 mg Q8HRS Angel Mnejivar M.D.   600 mg at 17 0554   • oxycodone immediate-release (ROXICODONE) tablet 10 mg  10 mg Q6HRS PRN Angel Menjivar M.D.   10 mg at 17 2220   • norepinephrine (LEVOPHED) 8 mg in  mL Infusion  0-30 mcg/min Continuous Angel Lema M.D.   Stopped at 17 1617    And   • vasopressin (VASOSTRICT) 20 Units in  mL Infusion  0.03 Units/min Continuous Angel Lema M.D.   Stopped at 17 2030   • ampicillin/sulbactam (UNASYN) 3 g in  mL IVPB  3 g Q6HRS Angel Lema M.D.   Stopped at 17 0427   • azithromycin (ZITHROMAX) 500 mg in D5W 250 mL IVPB  500 mg Q24HRS Angel Lema M.D.   Stopped at 17   • senna-docusate (PERICOLACE or SENOKOT S) 8.6-50 MG per tablet 2 Tab  2 Tab BID Eugene Castro M.D.   2 Tab at 17    And   • polyethylene glycol/lytes (MIRALAX) PACKET 1 Packet  1 Packet QDAY PRN Eugene Castro M.D.        And   • magnesium hydroxide (MILK OF MAGNESIA) suspension 30 mL   30 mL QDAY PRN Eugene Castro M.D.        And   • bisacodyl (DULCOLAX) suppository 10 mg  10 mg QDAY PRN Eugene Castro M.D.       • Respiratory Care per Protocol   Continuous RT Eugene Castro M.D.       • NS (BOLUS) infusion 500 mL  500 mL Once PRN Eugene Castro M.D.       • NS infusion   Continuous Angel Menjivar M.D. 75 mL/hr at 03/08/17 1912     • heparin injection 5,000 Units  5,000 Units Q8HRS Eugene Castro M.D.   5,000 Units at 03/09/17 0554   • insulin lispro (HUMALOG) injection 2-9 Units  2-9 Units 4X/DAY ACHLUIS Castro M.D.   Stopped at 03/08/17 1746   • glucose 4 g chewable tablet 16 g  16 g Q15 MIN PRN Eugene Castro M.D.        And   • dextrose 50% (D50W) injection 25 mL  25 mL Q15 MIN PRN Eugene Castro M.D.       • ondansetron (ZOFRAN) syringe/vial injection 4 mg  4 mg Q4HRS PRN Eugene Castro M.D.       • ondansetron (ZOFRAN ODT) dispertab 4 mg  4 mg Q4HRS PRN Eugene Castro M.D.       • famotidine (PEPCID) tablet 20 mg  20 mg Q24HRS Eugene Castro M.D.   20 mg at 03/08/17 0754    Or   • famotidine (PEPCID) injection 20 mg  20 mg Q24HRS Eugene Castro M.D.       • nicotine (NICODERM) 21 MG/24HR 21 mg  21 mg Daily-0600 Eugene Castro M.D.   21 mg at 03/09/17 0554    And   • nicotine polacrilex (NICORETTE) 2 MG piece 2 mg  2 mg Q HOUR PRN Eugene Castro M.D.       • duloxetine (CYMBALTA) capsule 60 mg  60 mg DAILY Eugene Castro M.D.   60 mg at 03/08/17 0754   • escitalopram (LEXAPRO) tablet 10 mg  10 mg DAILY Eugene Castro M.D.   10 mg at 03/08/17 0756   • insulin glargine (LANTUS) injection 20 Units  20 Units Nightly Eugene Castro M.D.   20 Units at 03/08/17 2106   • levothyroxine (SYNTHROID) tablet 75 mcg  75 mcg AM ES Eugene Castro M.D.   75 mcg at 03/09/17 0640   • simvastatin (ZOCOR) tablet 10 mg  10 mg Q EVENING Eugene Castro M.D.   10 mg at 03/08/17 2105   • ipratropium-albuterol (DUONEB) nebulizer solution 3 mL  3 mL Q4HRS (RT) Eugene Castro M.D.   3 mL at 03/09/17 0603   • guaifenesin LA (MUCINEX) tablet 600 mg  600  mg Q12HRS Eugene Castro M.D.   600 mg at 03/08/17 2105   • budesonide-formoterol (SYMBICORT) 160-4.5 MCG/ACT inhaler 2 Puff  2 Puff BID Sukumar Rouse, PHARMD   2 Puff at 03/09/17 0604     Last reviewed on 3/7/2017  6:53 PM by Angeles Odom PhT    Quality  Measures:  Medications reviewed, Labs reviewed and Radiology images reviewed        DVT Prophylaxis: Heparin  DVT prophylaxis - mechanical: SCDs  Ulcer prophylaxis: Yes  Antibiotics: Treating active infection/contamination beyond 24 hours perioperative coverage  Assessed for rehab: Patient returned to prior level of function, rehabilitation not indicated at this time    Assessment/Plan:  Acute on chronic (baseline 2-3 lpm n/c) hypoxemic respiratory failure   - O2/RT protocols, encourage IS/PEP   - mobilization  Septic shock secondary to community-acquired vs aspiration PNA - resolved   - cont abx, f/u cultures   - CVP at goal  Acute on chronic kidney disease - improving   - avoid nephrotoxins  Hyperkalemia.  Hyponatremia.  Anemia.  COPD without bronchospasm - BDs  Chronic diastolic heart failure with grade II diastolic dysfunction - compensated  Pulmonary hypertension with right ventricular systolic pressure of 54 mmHg.  History of obstructive sleep apnea.  She is not on treatment.  Diabetes mellitus type 2.  Chronic back pain.  History of systemic arterial hypertension.  She is currently hypotensive.  Obesity.  Gastroesophageal reflux disease.  History of hepatitis C.  Hypothyroidism.  Prophylaxis, diet, d/c sykes/CVL    Ok to transfer patient out of ICU today.     Discussed patient condition and risk of morbidity and/or mortality with RN, RT, Pharmacy, Charge nurse / hot rounds, QA team, Patient and hospitalist.

## 2017-03-10 LAB
ANION GAP SERPL CALC-SCNC: 5 MMOL/L (ref 0–11.9)
BACTERIA SPEC RESP CULT: NORMAL
BASOPHILS # BLD AUTO: 0.4 % (ref 0–1.8)
BASOPHILS # BLD: 0.04 K/UL (ref 0–0.12)
BUN SERPL-MCNC: 25 MG/DL (ref 8–22)
CALCIUM SERPL-MCNC: 8.3 MG/DL (ref 8.5–10.5)
CHLORIDE SERPL-SCNC: 108 MMOL/L (ref 96–112)
CO2 SERPL-SCNC: 21 MMOL/L (ref 20–33)
CREAT SERPL-MCNC: 1.17 MG/DL (ref 0.5–1.4)
EOSINOPHIL # BLD AUTO: 0.36 K/UL (ref 0–0.51)
EOSINOPHIL NFR BLD: 3.6 % (ref 0–6.9)
ERYTHROCYTE [DISTWIDTH] IN BLOOD BY AUTOMATED COUNT: 55.4 FL (ref 35.9–50)
GFR SERPL CREATININE-BSD FRML MDRD: 46 ML/MIN/1.73 M 2
GLUCOSE BLD-MCNC: 119 MG/DL (ref 65–99)
GLUCOSE BLD-MCNC: 74 MG/DL (ref 65–99)
GLUCOSE BLD-MCNC: 75 MG/DL (ref 65–99)
GLUCOSE BLD-MCNC: 98 MG/DL (ref 65–99)
GLUCOSE SERPL-MCNC: 88 MG/DL (ref 65–99)
GRAM STN SPEC: NORMAL
HCT VFR BLD AUTO: 25.4 % (ref 37–47)
HGB BLD-MCNC: 7.5 G/DL (ref 12–16)
IMM GRANULOCYTES # BLD AUTO: 0.05 K/UL (ref 0–0.11)
IMM GRANULOCYTES NFR BLD AUTO: 0.5 % (ref 0–0.9)
LYMPHOCYTES # BLD AUTO: 1.03 K/UL (ref 1–4.8)
LYMPHOCYTES NFR BLD: 10.2 % (ref 22–41)
MCH RBC QN AUTO: 24.6 PG (ref 27–33)
MCHC RBC AUTO-ENTMCNC: 29.5 G/DL (ref 33.6–35)
MCV RBC AUTO: 83.3 FL (ref 81.4–97.8)
MONOCYTES # BLD AUTO: 0.76 K/UL (ref 0–0.85)
MONOCYTES NFR BLD AUTO: 7.5 % (ref 0–13.4)
NEUTROPHILS # BLD AUTO: 7.89 K/UL (ref 2–7.15)
NEUTROPHILS NFR BLD: 77.8 % (ref 44–72)
NRBC # BLD AUTO: 0 K/UL
NRBC BLD AUTO-RTO: 0 /100 WBC
PLATELET # BLD AUTO: 174 K/UL (ref 164–446)
PMV BLD AUTO: 8.9 FL (ref 9–12.9)
POTASSIUM SERPL-SCNC: 4.2 MMOL/L (ref 3.6–5.5)
RBC # BLD AUTO: 3.05 M/UL (ref 4.2–5.4)
SIGNIFICANT IND 70042: NORMAL
SITE SITE: NORMAL
SODIUM SERPL-SCNC: 134 MMOL/L (ref 135–145)
SOURCE SOURCE: NORMAL
WBC # BLD AUTO: 10.1 K/UL (ref 4.8–10.8)

## 2017-03-10 PROCEDURE — 700101 HCHG RX REV CODE 250: Performed by: INTERNAL MEDICINE

## 2017-03-10 PROCEDURE — A9270 NON-COVERED ITEM OR SERVICE: HCPCS | Performed by: INTERNAL MEDICINE

## 2017-03-10 PROCEDURE — 770006 HCHG ROOM/CARE - MED/SURG/GYN SEMI*

## 2017-03-10 PROCEDURE — 700111 HCHG RX REV CODE 636 W/ 250 OVERRIDE (IP): Performed by: INTERNAL MEDICINE

## 2017-03-10 PROCEDURE — 82962 GLUCOSE BLOOD TEST: CPT

## 2017-03-10 PROCEDURE — 80048 BASIC METABOLIC PNL TOTAL CA: CPT

## 2017-03-10 PROCEDURE — 94760 N-INVAS EAR/PLS OXIMETRY 1: CPT

## 2017-03-10 PROCEDURE — A9270 NON-COVERED ITEM OR SERVICE: HCPCS | Performed by: HOSPITALIST

## 2017-03-10 PROCEDURE — 700105 HCHG RX REV CODE 258: Performed by: INTERNAL MEDICINE

## 2017-03-10 PROCEDURE — 94668 MNPJ CHEST WALL SBSQ: CPT

## 2017-03-10 PROCEDURE — 99233 SBSQ HOSP IP/OBS HIGH 50: CPT | Performed by: HOSPITALIST

## 2017-03-10 PROCEDURE — 85025 COMPLETE CBC W/AUTO DIFF WBC: CPT

## 2017-03-10 PROCEDURE — 700102 HCHG RX REV CODE 250 W/ 637 OVERRIDE(OP): Performed by: INTERNAL MEDICINE

## 2017-03-10 PROCEDURE — 99233 SBSQ HOSP IP/OBS HIGH 50: CPT | Performed by: INTERNAL MEDICINE

## 2017-03-10 PROCEDURE — 94640 AIRWAY INHALATION TREATMENT: CPT

## 2017-03-10 PROCEDURE — 700102 HCHG RX REV CODE 250 W/ 637 OVERRIDE(OP): Performed by: HOSPITALIST

## 2017-03-10 RX ORDER — TRAZODONE HYDROCHLORIDE 150 MG/1
300 TABLET ORAL
Status: DISCONTINUED | OUTPATIENT
Start: 2017-03-10 | End: 2017-03-17 | Stop reason: HOSPADM

## 2017-03-10 RX ADMIN — NYSTATIN 1500000 UNITS: 100000 POWDER TOPICAL at 13:34

## 2017-03-10 RX ADMIN — AMPICILLIN SODIUM AND SULBACTAM SODIUM 3 G: 2; 1 INJECTION, POWDER, FOR SOLUTION INTRAMUSCULAR; INTRAVENOUS at 09:59

## 2017-03-10 RX ADMIN — GUAIFENESIN 600 MG: 600 TABLET, EXTENDED RELEASE ORAL at 21:01

## 2017-03-10 RX ADMIN — TIOTROPIUM BROMIDE 1 CAPSULE: 18 CAPSULE ORAL; RESPIRATORY (INHALATION) at 07:41

## 2017-03-10 RX ADMIN — GABAPENTIN 600 MG: 300 CAPSULE ORAL at 06:39

## 2017-03-10 RX ADMIN — OXYCODONE HYDROCHLORIDE 10 MG: 5 TABLET ORAL at 16:55

## 2017-03-10 RX ADMIN — LEVOTHYROXINE SODIUM 75 MCG: 75 TABLET ORAL at 06:39

## 2017-03-10 RX ADMIN — GABAPENTIN 600 MG: 300 CAPSULE ORAL at 20:59

## 2017-03-10 RX ADMIN — IPRATROPIUM BROMIDE AND ALBUTEROL SULFATE 3 ML: .5; 3 SOLUTION RESPIRATORY (INHALATION) at 18:30

## 2017-03-10 RX ADMIN — HEPARIN SODIUM 5000 UNITS: 5000 INJECTION, SOLUTION INTRAVENOUS; SUBCUTANEOUS at 06:39

## 2017-03-10 RX ADMIN — IPRATROPIUM BROMIDE AND ALBUTEROL SULFATE 3 ML: .5; 3 SOLUTION RESPIRATORY (INHALATION) at 02:54

## 2017-03-10 RX ADMIN — IPRATROPIUM BROMIDE AND ALBUTEROL SULFATE 3 ML: .5; 3 SOLUTION RESPIRATORY (INHALATION) at 07:42

## 2017-03-10 RX ADMIN — AMPICILLIN SODIUM AND SULBACTAM SODIUM 3 G: 2; 1 INJECTION, POWDER, FOR SOLUTION INTRAMUSCULAR; INTRAVENOUS at 21:42

## 2017-03-10 RX ADMIN — ESCITALOPRAM OXALATE 10 MG: 10 TABLET ORAL at 08:53

## 2017-03-10 RX ADMIN — DULOXETINE HYDROCHLORIDE 60 MG: 60 CAPSULE, DELAYED RELEASE ORAL at 08:53

## 2017-03-10 RX ADMIN — GUAIFENESIN 600 MG: 600 TABLET, EXTENDED RELEASE ORAL at 08:53

## 2017-03-10 RX ADMIN — SIMVASTATIN 10 MG: 20 TABLET, FILM COATED ORAL at 21:00

## 2017-03-10 RX ADMIN — BUDESONIDE AND FORMOTEROL FUMARATE DIHYDRATE 2 PUFF: 160; 4.5 AEROSOL RESPIRATORY (INHALATION) at 18:30

## 2017-03-10 RX ADMIN — STANDARDIZED SENNA CONCENTRATE AND DOCUSATE SODIUM 2 TABLET: 8.6; 5 TABLET, FILM COATED ORAL at 20:59

## 2017-03-10 RX ADMIN — AZITHROMYCIN 500 MG: 500 INJECTION, POWDER, LYOPHILIZED, FOR SOLUTION INTRAVENOUS at 23:08

## 2017-03-10 RX ADMIN — BUDESONIDE AND FORMOTEROL FUMARATE DIHYDRATE 2 PUFF: 160; 4.5 AEROSOL RESPIRATORY (INHALATION) at 21:41

## 2017-03-10 RX ADMIN — INSULIN GLARGINE 20 UNITS: 100 INJECTION, SOLUTION SUBCUTANEOUS at 21:50

## 2017-03-10 RX ADMIN — NICOTINE 21 MG: 21 PATCH TRANSDERMAL at 06:40

## 2017-03-10 RX ADMIN — Medication 325 MG: at 16:55

## 2017-03-10 RX ADMIN — OXYCODONE HYDROCHLORIDE 10 MG: 5 TABLET ORAL at 08:53

## 2017-03-10 RX ADMIN — Medication 325 MG: at 06:39

## 2017-03-10 RX ADMIN — NYSTATIN 1500000 UNITS: 100000 POWDER TOPICAL at 21:41

## 2017-03-10 RX ADMIN — OXYCODONE HYDROCHLORIDE 10 MG: 5 TABLET ORAL at 23:28

## 2017-03-10 RX ADMIN — IPRATROPIUM BROMIDE AND ALBUTEROL SULFATE 3 ML: .5; 3 SOLUTION RESPIRATORY (INHALATION) at 15:49

## 2017-03-10 RX ADMIN — AMPICILLIN SODIUM AND SULBACTAM SODIUM 3 G: 2; 1 INJECTION, POWDER, FOR SOLUTION INTRAMUSCULAR; INTRAVENOUS at 03:29

## 2017-03-10 RX ADMIN — BUDESONIDE AND FORMOTEROL FUMARATE DIHYDRATE 2 PUFF: 160; 4.5 AEROSOL RESPIRATORY (INHALATION) at 07:41

## 2017-03-10 RX ADMIN — HEPARIN SODIUM 5000 UNITS: 5000 INJECTION, SOLUTION INTRAVENOUS; SUBCUTANEOUS at 21:01

## 2017-03-10 RX ADMIN — GABAPENTIN 600 MG: 300 CAPSULE ORAL at 13:34

## 2017-03-10 RX ADMIN — AMPICILLIN SODIUM AND SULBACTAM SODIUM 3 G: 2; 1 INJECTION, POWDER, FOR SOLUTION INTRAMUSCULAR; INTRAVENOUS at 16:55

## 2017-03-10 RX ADMIN — IPRATROPIUM BROMIDE AND ALBUTEROL SULFATE 3 ML: .5; 3 SOLUTION RESPIRATORY (INHALATION) at 11:03

## 2017-03-10 ASSESSMENT — COPD QUESTIONNAIRES
HAVE YOU SMOKED AT LEAST 100 CIGARETTES IN YOUR ENTIRE LIFE: YES
COPD SCREENING SCORE: 7
DURING THE PAST 4 WEEKS HOW MUCH DID YOU FEEL SHORT OF BREATH: SOME OF THE TIME
DO YOU EVER COUGH UP ANY MUCUS OR PHLEGM?: YES, A FEW DAYS A WEEK OR MONTH

## 2017-03-10 ASSESSMENT — PAIN SCALES - GENERAL
PAINLEVEL_OUTOF10: 4
PAINLEVEL_OUTOF10: 5
PAINLEVEL_OUTOF10: 6
PAINLEVEL_OUTOF10: 5
PAINLEVEL_OUTOF10: 2
PAINLEVEL_OUTOF10: 7
PAINLEVEL_OUTOF10: 3
PAINLEVEL_OUTOF10: 4
PAINLEVEL_OUTOF10: 5
PAINLEVEL_OUTOF10: 2

## 2017-03-10 ASSESSMENT — ENCOUNTER SYMPTOMS
BACK PAIN: 0
NECK PAIN: 0
CHILLS: 0
FEVER: 0
EYE PAIN: 0
DIZZINESS: 0
BLURRED VISION: 0
INSOMNIA: 0
SHORTNESS OF BREATH: 1
HEADACHES: 0
ABDOMINAL PAIN: 0
NAUSEA: 0
PALPITATIONS: 0
TINGLING: 0
SORE THROAT: 0
COUGH: 1
DEPRESSION: 0

## 2017-03-10 ASSESSMENT — LIFESTYLE VARIABLES: DO YOU DRINK ALCOHOL: NO

## 2017-03-10 NOTE — PROGRESS NOTES
Pulmonary Critical Care Progress Note        Chief Complaint: SOB    History of Present Illness: 66 y.o. female who has been feeling congested for the last few days. She lives in [place].  She uses a wheelchair intermittently to get around.  She has chronic back pain.  Today, she fell at home on 2 occasions because she was weak.  She has a cough with chest congestion and is producing some cream-colored sputum.  She denies hemoptysis.  She denies any chest pain or chest pressure.  She has no nausea, vomiting or diarrhea.  She has had poor oral intake today because she could not get food or water.  She denies dysuria, urgency, frequency or hematuria.  She presented to the emergency room.  She is on vasopressor  support.  She was hypotensive and has received over 4 liters of intravenous  crystalloid solution.     ROS:  Respiratory: positive cough and negative shortness of breath, Cardiac: negative, GI: negative.  All other systems negative.    Interval Events:  24 hour interval history reviewed    - stable WBC   - good cough with moderate secretions    PFSH:  No change.    Respiratory:   3 lpm n/c, IS 1000 mL  Pulse Oximetry: 98 %          Exam: unlabored respirations, no intercostal retractions or accessory muscle use and rhonchi right > left  ImagingCXR  I have personally reviewed the chest x-ray my impression is  improving diffuse R sided infiltrates (no film today)        Invalid input(s): [id number]    HemoDynamics:  Pulse: 60, Heart Rate (Monitored): (!) 59  NIBP: 119/56 mmHg  CVP (mm Hg): (!) 8 MM HG  CVP 13-18    Exam: regular rate and rhythm, regular rhythm (Sinus)  Imaging: Available data reviewed        Neuro:  GCS Total [place] Coma Score: 15       Exam: no focal deficits noted mental status intact oriented for age x3  Imaging: Available data reviewed    Fluids:  Intake/Output       03/08/17 0700 - 03/09/17 0659 03/09/17 0700 - 03/10/17 0659 03/10/17 0700 - 03/11/17 0659      2608-9749 2103-7057 Total  0700-1859 1900-0659 Total 0700-1859 1900-0659 Total       Intake    P.O.  550  1000 1550  250  920 1170  --  -- --    P.O. 550 1000 1550  -- -- --    I.V.  1794  1500 3294  1100  1350 2450  --  -- --    Norepinephrine Volume 94 0 94 -- -- -- -- -- --    IV Volume (MIVF) -- 1000 1000  -- -- --    IV Volume (NS) 1500 -- 1500 -- -- -- -- -- --    IV Piggyback Volume (IV Piggyback) 200 500 700 200 450 650 -- -- --    Total Intake 2344 2500 4844 1350 2270 3620 -- -- --       Output    Urine  1575  2225 3800  1810  2850 4660  --  -- --    Number of Times Voided -- -- -- -- 11 x 11 x -- -- --    Indwelling Cathether 1575 2225 3800 1610 -- 1610 -- -- --    Void (ml) -- -- -- 200 2850 3050 -- -- --    Stool  --  -- --  --  -- --  --  -- --    Number of Times Stooled 0 x 0 x 0 x 1 x 2 x 3 x -- -- --    Total Output 1575 2225 3800 1810 2850 4660 -- -- --       Net I/O      -460 -580 -1040 -- -- --        Weight: 108.6 kg (239 lb 6.7 oz)  Recent Labs      03/07/17   1720  03/08/17   0520  03/09/17   0500  03/10/17   0333   SODIUM  133*  137  137  134*   POTASSIUM  6.3*  4.9  4.4  4.2   CHLORIDE  104  111  107  108   CO2  22  21  21  21   BUN  52*  36*  25*  25*   CREATININE  1.89*  1.28  1.17  1.17   MAGNESIUM  1.9   --    --    --    CALCIUM  8.4*  8.8  8.4*  8.3*       GI/Nutrition:  Exam: abdomen is soft and non-tender, normal active bowel sounds  Imaging: Available data reviewed  taking PO  Liver Function  Recent Labs      03/07/17   1720  03/08/17   0520  03/09/17   0500  03/10/17   0333   ALTSGPT  10   --    --    --    ASTSGOT  17   --    --    --    ALKPHOSPHAT  63   --    --    --    TBILIRUBIN  0.6   --    --    --    GLUCOSE  239*  149*  83  88       Heme:  Recent Labs      03/07/17   1720  03/08/17   0520  03/09/17   0500  03/10/17   0333   RBC  3.43*  3.44*  2.99*  3.05*   HEMOGLOBIN  8.7*  8.6*  7.4*  7.5*   HEMATOCRIT  29.4*  29.9*  25.6*  25.4*   PLATELETCT  221  186  162*   174   PROTHROMBTM  15.8*   --    --    --    APTT  37.1*   --    --    --    INR  1.22*   --    --    --    IRON   --    --   <10*   --    TOTIRONBC   --    --   248*   --        Infectious Disease:  Temp  Av.1 °C (98.7 °F)  Min: 36.9 °C (98.4 °F)  Max: 37.3 °C (99.1 °F)  Micro: reviewed. Ucx with strep  Recent Labs      17   1720  17   0520  17   0500  03/10/17   0333   WBC  16.6*  13.6*  10.8  10.1   NEUTSPOLYS  82.60*   --   81.80*  77.80*   LYMPHOCYTES  3.50*   --   8.60*  10.20*   MONOCYTES  0.80   --   6.20  7.50   EOSINOPHILS  0.00   --   2.50  3.60   BASOPHILS  0.00   --   0.30  0.40   ASTSGOT  17   --    --    --    ALTSGPT  10   --    --    --    ALKPHOSPHAT  63   --    --    --    TBILIRUBIN  0.6   --    --    --      Current Facility-Administered Medications   Medication Dose Frequency Provider Last Rate Last Dose   • ferrous sulfate tablet 325 mg  325 mg BID WITH MEALS Angel Menjivar M.D.   325 mg at 03/10/17 0639   • morphine (pf) 4 mg/ml injection 1-4 mg  1-4 mg Q4HRS PRN Agnel Menjivar M.D.   2 mg at 17 1421   • tiotropium (SPIRIVA) 18 MCG inhalation capsule 1 Cap  1 Cap QDAILY (RT) Eugene Castro M.D.   1 Cap at 17 0605   • nystatin (MYCOSTATIN) powder   TID Angel Menjivar M.D.   1,500,000 Units at 17 2045   • gabapentin (NEURONTIN) capsule 600 mg  600 mg Q8HRS Angel Menjivar M.D.   600 mg at 03/10/17 0639   • oxycodone immediate-release (ROXICODONE) tablet 10 mg  10 mg Q6HRS PRN Angel Menjivar M.D.   10 mg at 17 2315   • ampicillin/sulbactam (UNASYN) 3 g in  mL IVPB  3 g Q6HRS Angel Lema M.D.   Stopped at 03/10/17 0359   • azithromycin (ZITHROMAX) 500 mg in D5W 250 mL IVPB  500 mg Q24HRS Angel Lema M.D.   Stopped at 17   • senna-docusate (PERICOLACE or SENOKOT S) 8.6-50 MG per tablet 2 Tab  2 Tab BID Eugene Castro M.D.   Stopped at 17    And   • polyethylene glycol/lytes (MIRALAX) PACKET 1  Packet  1 Packet QDAY PRN Eugene Castro M.D.        And   • magnesium hydroxide (MILK OF MAGNESIA) suspension 30 mL  30 mL QDAY PRN Eugene Castro M.D.        And   • bisacodyl (DULCOLAX) suppository 10 mg  10 mg QDAY PRN Eugene Castro M.D.       • Respiratory Care per Protocol   Continuous RT Eugene Castro M.D.       • NS (BOLUS) infusion 500 mL  500 mL Once PRN Eugene Castro M.D.       • NS infusion   Continuous Angel Menjivar M.D. 75 mL/hr at 03/09/17 2026     • heparin injection 5,000 Units  5,000 Units Q8HRS Eugene Castro M.D.   5,000 Units at 03/10/17 0639   • insulin lispro (HUMALOG) injection 2-9 Units  2-9 Units 4X/DAY ACHLUIS Castro M.D.   Stopped at 03/08/17 1746   • glucose 4 g chewable tablet 16 g  16 g Q15 MIN PRN Eugene Castro M.D.        And   • dextrose 50% (D50W) injection 25 mL  25 mL Q15 MIN PRN Eugene Castro M.D.       • ondansetron (ZOFRAN) syringe/vial injection 4 mg  4 mg Q4HRS PRN Eugene Castro M.D.       • ondansetron (ZOFRAN ODT) dispertab 4 mg  4 mg Q4HRS PRN Eugene Castro M.D.       • nicotine (NICODERM) 21 MG/24HR 21 mg  21 mg Daily-0600 Eugene Castro M.D.   21 mg at 03/10/17 0640    And   • nicotine polacrilex (NICORETTE) 2 MG piece 2 mg  2 mg Q HOUR PRN Eugene Castro M.D.       • duloxetine (CYMBALTA) capsule 60 mg  60 mg DAILY Eugene Castro M.D.   60 mg at 03/09/17 0942   • escitalopram (LEXAPRO) tablet 10 mg  10 mg DAILY Eugene Castro M.D.   10 mg at 03/09/17 0943   • insulin glargine (LANTUS) injection 20 Units  20 Units Nightly Eugene Castro M.D.   20 Units at 03/09/17 2034   • levothyroxine (SYNTHROID) tablet 75 mcg  75 mcg AM ES Eugene Castro M.D.   75 mcg at 03/10/17 0639   • simvastatin (ZOCOR) tablet 10 mg  10 mg Q EVENING Eugene Castro M.D.   10 mg at 03/09/17 2026   • ipratropium-albuterol (DUONEB) nebulizer solution 3 mL  3 mL Q4HRS (RT) Eugene Castro M.D.   3 mL at 03/10/17 0254   • guaifenesin LA (MUCINEX) tablet 600 mg  600 mg Q12HRS Eugene Castro M.D.   600 mg at 03/09/17 2026   •  budesonide-formoterol (SYMBICORT) 160-4.5 MCG/ACT inhaler 2 Puff  2 Puff BID Sukumar Rouse, PHARMD   2 Puff at 03/09/17 1908     Last reviewed on 3/7/2017  6:53 PM by Rhea Esparza    Quality  Measures:  Medications reviewed, Labs reviewed and Radiology images reviewed        DVT Prophylaxis: Heparin  DVT prophylaxis - mechanical: SCDs  Ulcer prophylaxis: Yes  Antibiotics: Treating active infection/contamination beyond 24 hours perioperative coverage  Assessed for rehab: Patient returned to prior level of function, rehabilitation not indicated at this time    Assessment/Plan:  Acute on chronic (baseline 2-3 lpm n/c) hypoxemic respiratory failure - resolving   - O2/RT protocols, encourage IS/PEP   - mobilization   - mucolytics  Septic shock secondary to community-acquired vs aspiration PNA - resolved   - cont abx for 7 days   - CVP at goal  Acute on chronic kidney disease - improving   - avoid nephrotoxins  Hyperkalemia.  Hyponatremia.  Anemia.  COPD without bronchospasm - BDs  Chronic diastolic heart failure with grade II diastolic dysfunction - compensated  Pulmonary hypertension with right ventricular systolic pressure of 54 mmHg.  History of obstructive sleep apnea.  She is not on treatment.  Diabetes mellitus type 2.  Chronic back pain.  History of systemic arterial hypertension.  She is currently hypotensive.  Obesity.  Gastroesophageal reflux disease.  History of hepatitis C.  Hypothyroidism.  Prophylaxis, diet    Awaiting transfer of patient out of ICU today. [facility] will sign off at transfer. Please call with questions.      Discussed patient condition and risk of morbidity and/or mortality with RN, RT, Pharmacy, Charge nurse / hot rounds, QA team, Patient and hospitalist.

## 2017-03-10 NOTE — DOCUMENTATION QUERY
"DOCUMENTATION QUERY    PROVIDERS: Please select “Cosign w/ note”to reply to query.    To better represent the severity of illness of your patient, please review the following information and exercise your independent professional judgment in responding to this query.     \"Acute on Chronic Kidney Disease\" is documented in the History and Physical and Progress Notes. Based upon the clinical findings, risk factors, and treatment, can this diagnosis be further specified?    • Chronic Kidney Disease Stage I      • Chronic Kidney Disease Stage II     • Chronic Kidney Disease Stage III    • Chronic Kidney Disease Stage IV    • Chronic Kidney Disease Stage V     • End Stage Renal Disease  • Other explanation of clinical findings  • Unable to determine     The medical record reflects the following:   Clinical Findings  BUN 52 on admission; Cr 1.89 on admission; documented \"chronic kidney disease\"; dx hyperkalemia; dx hyponatremia   Treatment  daily BMP, IVF, renal dosing   Risk Factors  Age; dx severe sepsis with septic shock; dx acute renal failure; hx HTN and T2DM w/Neuropathy   Location within medical record  History and Physical, Progress Notes, Lab Results  and MAR     Thank you,     Jeremias Dye  Clinical   P: [phone]        "

## 2017-03-10 NOTE — CARE PLAN
Problem: Safety  Goal: Will remain free from injury  Outcome: PROGRESSING AS EXPECTED  Intervention: Provide assistance with mobility  Pt assisted with ambulation by staff. Pt encouraged to participate in activity. Activity progressed at pt's own rate. Pt given rest periods as needed.       Problem: Respiratory:  Goal: Respiratory status will improve  Outcome: PROGRESSING AS EXPECTED  Intervention: Administer and titrate oxygen therapy  Pt's respiratory status assessed. continuous pulse oximetry in use. Head of bed elevated. Collaboration with RT. Ambu bag at bedside. Pt encouraged to use incentive spirometer. Pt encouraged to self suction as needed. Supplemental oxygen administered via silicone nasal cannula.

## 2017-03-10 NOTE — CARE PLAN
Problem: Pain Management  Goal: Pain level will decrease to patient’s comfort goal  Outcome: PROGRESSING AS EXPECTED  pts pain will be assessed q 2 hour and treated accordingly to ordered medications. PT will be reassessed in a timely manner and appropriate action taken. Pt will use 0-10 pain score and set a comfort score for themselves at the beginning of the shift    Problem: Skin Integrity  Goal: Risk for impaired skin integrity will decrease  Outcome: PROGRESSING AS EXPECTED  Problem: risk for impaired skin integrity   Goal: maintain skin integrity throughout duration of pt stay.   Outcome: progressing as expected  -jay jay scale completed q shift, see CCU obs  -Pt turned q2 hours and documented, pillows used for repositioning  -pt assessed for skin breakdown, any abnormalities recorded in wound flowsheet

## 2017-03-10 NOTE — WOUND TEAM
Wound Team consulted to assess wound on patient's right heel. It is a bruise. There is bruising on the lateral side of left foot also. New consult placed for debridement of callus to right foot digit 2 by a certified nail care wound nurse. No other interventions needed from Wound Team at this time.

## 2017-03-10 NOTE — CARE PLAN
Problem: Bronchoconstriction:  Goal: Improve in air movement and diminished wheezing  Outcome: PROGRESSING SLOWER THAN EXPECTED  Pt tolerating duo, symbicort and spiriva    Problem: Hyperinflation:  Goal: Prevent or improve atelectasis  Outcome: PROGRESSING AS EXPECTED  IS 1000, instructed pt on how to utilize the IS properly, added aerobika to neb to help mobilize secretions

## 2017-03-10 NOTE — PROGRESS NOTES
Hospital Medicine Progress Note, Adult, Complex               Author: Angel Menjivar Date & Time created: 3/10/2017  7:57 AM     66yof here with pneumonia/resp failure and sepsis    Interval History:    No events over night  In nsr  Bp stable  Afebrile  Eating, good cough  uop good  Mobilizing well  1000 on is  Copious secretions  On 4L  trazadone        Review of Systems:  Review of Systems   Constitutional: Positive for malaise/fatigue. Negative for fever and chills.   HENT: Negative for sore throat.    Eyes: Negative for blurred vision and pain.   Respiratory: Positive for cough and shortness of breath.    Cardiovascular: Negative for chest pain and palpitations.   Gastrointestinal: Negative for nausea and abdominal pain.   Genitourinary: Negative for dysuria and urgency.   Musculoskeletal: Negative for back pain and neck pain.   Skin: Negative for itching and rash.   Neurological: Negative for dizziness, tingling and headaches.   Psychiatric/Behavioral: Negative for depression. The patient does not have insomnia.    All other systems reviewed and are negative.      Physical Exam:  Physical Exam   Constitutional: She is oriented to person, place, and time. She appears well-developed and well-nourished. No distress.   HENT:   Right Ear: External ear normal.   Left Ear: External ear normal.   Nose: Nose normal.   Eyes: Conjunctivae are normal. Right eye exhibits no discharge. Left eye exhibits no discharge.   Neck: No JVD present.   Cardiovascular: Regular rhythm and normal heart sounds.    No murmur heard.  Pulmonary/Chest: Effort normal. No stridor. No respiratory distress. She has no wheezes. She has rales.   Abdominal: Soft. Bowel sounds are normal. She exhibits no distension. There is no tenderness.   obese   Musculoskeletal: She exhibits edema. She exhibits no tenderness.   Neurological: She is alert and oriented to person, place, and time.   Skin: Skin is warm and dry. She is not diaphoretic. No erythema.    Psychiatric: She has a normal mood and affect. Her behavior is normal.   Nursing note and vitals reviewed.      Labs:        Invalid input(s): [id number]      Recent Labs      03/07/17   1720  03/08/17   0520  03/09/17   0500  03/10/17   0333   SODIUM  133*  137  137  134*   POTASSIUM  6.3*  4.9  4.4  4.2   CHLORIDE  104  111  107  108   CO2  22  21  21  21   BUN  52*  36*  25*  25*   CREATININE  1.89*  1.28  1.17  1.17   MAGNESIUM  1.9   --    --    --    CALCIUM  8.4*  8.8  8.4*  8.3*     Recent Labs      03/07/17   1720  03/08/17   0520  03/09/17   0500  03/10/17   0333   ALTSGPT  10   --    --    --    ASTSGOT  17   --    --    --    ALKPHOSPHAT  63   --    --    --    TBILIRUBIN  0.6   --    --    --    GLUCOSE  239*  149*  83  88     Recent Labs      03/07/17   1720  03/08/17   0520  03/09/17   0500  03/10/17   0333   RBC  3.43*  3.44*  2.99*  3.05*   HEMOGLOBIN  8.7*  8.6*  7.4*  7.5*   HEMATOCRIT  29.4*  29.9*  25.6*  25.4*   PLATELETCT  221  186  162*  174   PROTHROMBTM  15.8*   --    --    --    APTT  37.1*   --    --    --    INR  1.22*   --    --    --    IRON   --    --   <10*   --    TOTIRONBC   --    --   248*   --      Recent Labs      03/07/17   1720  03/08/17   0520  03/09/17   0500  03/10/17   0333   WBC  16.6*  13.6*  10.8  10.1   NEUTSPOLYS  82.60*   --   81.80*  77.80*   LYMPHOCYTES  3.50*   --   8.60*  10.20*   MONOCYTES  0.80   --   6.20  7.50   EOSINOPHILS  0.00   --   2.50  3.60   BASOPHILS  0.00   --   0.30  0.40   ASTSGOT  17   --    --    --    ALTSGPT  10   --    --    --    ALKPHOSPHAT  63   --    --    --    TBILIRUBIN  0.6   --    --    --            Hemodynamics:  Temp (24hrs), Av.1 °C (98.7 °F), Min:36.9 °C (98.4 °F), Max:37.3 °C (99.1 °F)  Temperature: 36.9 °C (98.4 °F)  Pulse  Av.6  Min: 58  Max: 94Heart Rate (Monitored): 64  NIBP: 119/56 mmHg  CVP (mm Hg): (!) 8 MM HG  Respiratory:    Respiration: (!) 24, Pulse Oximetry: 95 %, O2 Daily Delivery Respiratory :  Silicone Nasal Cannula     Given By:: Mouthpiece, #MDI/DPI Given: MDI/DPI x 1, PEP/CPT Method: Flutter Valve, Work Of Breathing / Effort: Mild  RUL Breath Sounds: Fine Crackles, RML Breath Sounds: Fine Crackles, RLL Breath Sounds: Diminished, BIRD Breath Sounds: Fine Crackles, LLL Breath Sounds: Diminished  Fluids:    Intake/Output Summary (Last 24 hours) at 03/10/17 0757  Last data filed at 03/10/17 0600   Gross per 24 hour   Intake   3620 ml   Output   4660 ml   Net  -1040 ml     Weight: 108.6 kg (239 lb 6.7 oz)  GI/Nutrition:  Orders Placed This Encounter   Procedures   • Diet Order     Standing Status: Standing      Number of Occurrences: 1      Standing Expiration Date:      Order Specific Question:  Diet:     Answer:  Diabetic [3]     Medical Decision Making, by Problem:  Active Hospital Problems    Diagnosis   • DM type 2, uncontrolled, with renal complications (CMS-HCC) [E11.29, E11.65]- ssi and trend.    • Morbid obesity (CMS-HCC) [E66.01]   • Hyponatremia [E87.1]- iv fluids. Suspect hypovolemia 2/2 pneumonia. Resolving.    • Hyperkalemia [E87.5]- trend close. Improving. Iv fluids.    • Acute on chronic renal failure (CMS-HCC) [N17.9, N18.9]- iv fluids. Trend lytes and creat. Resolving.    • Acute on chronic respiratory failure (CMS-HCC) [J96.20]- 2/2 pneumonia. Rt protocol treat pneumonia. o2 as needed. improving   • Severe sepsis with septic shock (CMS-HCC) [A41.9, R65.21]- 2/2 pneumonia with respiratory dysfunction. Treat pneumonia. Iv abx, iv fluids. Titrate pressors as tolerated. Trend lytes, lactic and correct prn. resolving   • Anemia [D64.9]- low fe studies. Start replacement   • CAP (community acquired pneumonia) [J18.9]- iv abx, pulm toilet, rt protocol, f/u on cultures. Neg to date       • Gastroesophageal reflux disease with esophagitis [K21.0]- ppi   • Chronic constipation [K59.09]- bowel protocol.    • Chronic respiratory failure (CMS-HCC) [J96.10]- on 2L at home.    • EDITH (obstructive sleep  apnea) [G47.33]   • Chronic pain syndrome [G89.4]   • Vitamin D deficiency [E55.9]   • Hypothyroidism [E03.9]- cont meds.    • COPD (chronic obstructive pulmonary disease) (CMS-HCC) [J44.9]- no obvious flare. Cont inhalers. Not on steroids. Rt protocol.     • HTN (hypertension) [I10]- benign   Chronic diastolic CHF- grade2- watch for worsening respiratory failure with fluids.   Tenia corporis- Nystatin  Discussed plan with RN      EKG reviewed, Labs reviewed, Medications reviewed and Radiology images reviewed  Mckeon catheter: Critically Ill - Requiring Accurate Measurement of Urinary Output  Central line in place: Concentrated IV drugs    DVT Prophylaxis: Heparin    Ulcer prophylaxis: Yes  Antibiotics: Treating active infection/contamination beyond 24 hours perioperative coverage  Assessed for rehab: Patient was assess for and/or received rehabilitation services during this hospitalization

## 2017-03-11 LAB
ANION GAP SERPL CALC-SCNC: 9 MMOL/L (ref 0–11.9)
BASOPHILS # BLD AUTO: 0.3 % (ref 0–1.8)
BASOPHILS # BLD: 0.03 K/UL (ref 0–0.12)
BUN SERPL-MCNC: 24 MG/DL (ref 8–22)
CALCIUM SERPL-MCNC: 8.8 MG/DL (ref 8.5–10.5)
CHLORIDE SERPL-SCNC: 107 MMOL/L (ref 96–112)
CO2 SERPL-SCNC: 20 MMOL/L (ref 20–33)
CREAT SERPL-MCNC: 1.04 MG/DL (ref 0.5–1.4)
EOSINOPHIL # BLD AUTO: 0.32 K/UL (ref 0–0.51)
EOSINOPHIL NFR BLD: 3.1 % (ref 0–6.9)
ERYTHROCYTE [DISTWIDTH] IN BLOOD BY AUTOMATED COUNT: 55.3 FL (ref 35.9–50)
GFR SERPL CREATININE-BSD FRML MDRD: 53 ML/MIN/1.73 M 2
GLUCOSE BLD-MCNC: 111 MG/DL (ref 65–99)
GLUCOSE BLD-MCNC: 145 MG/DL (ref 65–99)
GLUCOSE BLD-MCNC: 62 MG/DL (ref 65–99)
GLUCOSE BLD-MCNC: 86 MG/DL (ref 65–99)
GLUCOSE BLD-MCNC: 93 MG/DL (ref 65–99)
GLUCOSE SERPL-MCNC: 61 MG/DL (ref 65–99)
HCT VFR BLD AUTO: 28.2 % (ref 37–47)
HGB BLD-MCNC: 8.2 G/DL (ref 12–16)
IMM GRANULOCYTES # BLD AUTO: 0.1 K/UL (ref 0–0.11)
IMM GRANULOCYTES NFR BLD AUTO: 1 % (ref 0–0.9)
LYMPHOCYTES # BLD AUTO: 1.22 K/UL (ref 1–4.8)
LYMPHOCYTES NFR BLD: 12 % (ref 22–41)
MCH RBC QN AUTO: 24.3 PG (ref 27–33)
MCHC RBC AUTO-ENTMCNC: 29.1 G/DL (ref 33.6–35)
MCV RBC AUTO: 83.7 FL (ref 81.4–97.8)
MONOCYTES # BLD AUTO: 0.96 K/UL (ref 0–0.85)
MONOCYTES NFR BLD AUTO: 9.4 % (ref 0–13.4)
NEUTROPHILS # BLD AUTO: 7.53 K/UL (ref 2–7.15)
NEUTROPHILS NFR BLD: 74.2 % (ref 44–72)
NRBC # BLD AUTO: 0.02 K/UL
NRBC BLD AUTO-RTO: 0.2 /100 WBC
PLATELET # BLD AUTO: 202 K/UL (ref 164–446)
PMV BLD AUTO: 9.1 FL (ref 9–12.9)
POTASSIUM SERPL-SCNC: 4 MMOL/L (ref 3.6–5.5)
RBC # BLD AUTO: 3.37 M/UL (ref 4.2–5.4)
SODIUM SERPL-SCNC: 136 MMOL/L (ref 135–145)
WBC # BLD AUTO: 10.2 K/UL (ref 4.8–10.8)

## 2017-03-11 PROCEDURE — 700111 HCHG RX REV CODE 636 W/ 250 OVERRIDE (IP): Performed by: HOSPITALIST

## 2017-03-11 PROCEDURE — 85025 COMPLETE CBC W/AUTO DIFF WBC: CPT

## 2017-03-11 PROCEDURE — 770006 HCHG ROOM/CARE - MED/SURG/GYN SEMI*

## 2017-03-11 PROCEDURE — A9270 NON-COVERED ITEM OR SERVICE: HCPCS | Performed by: INTERNAL MEDICINE

## 2017-03-11 PROCEDURE — 700111 HCHG RX REV CODE 636 W/ 250 OVERRIDE (IP): Performed by: INTERNAL MEDICINE

## 2017-03-11 PROCEDURE — 700102 HCHG RX REV CODE 250 W/ 637 OVERRIDE(OP): Performed by: HOSPITALIST

## 2017-03-11 PROCEDURE — 94640 AIRWAY INHALATION TREATMENT: CPT

## 2017-03-11 PROCEDURE — A9270 NON-COVERED ITEM OR SERVICE: HCPCS | Performed by: HOSPITALIST

## 2017-03-11 PROCEDURE — 94760 N-INVAS EAR/PLS OXIMETRY 1: CPT

## 2017-03-11 PROCEDURE — 82962 GLUCOSE BLOOD TEST: CPT

## 2017-03-11 PROCEDURE — 36415 COLL VENOUS BLD VENIPUNCTURE: CPT

## 2017-03-11 PROCEDURE — 99233 SBSQ HOSP IP/OBS HIGH 50: CPT | Performed by: INTERNAL MEDICINE

## 2017-03-11 PROCEDURE — 700101 HCHG RX REV CODE 250: Performed by: INTERNAL MEDICINE

## 2017-03-11 PROCEDURE — 700102 HCHG RX REV CODE 250 W/ 637 OVERRIDE(OP): Performed by: INTERNAL MEDICINE

## 2017-03-11 PROCEDURE — 94669 MECHANICAL CHEST WALL OSCILL: CPT

## 2017-03-11 PROCEDURE — 80048 BASIC METABOLIC PNL TOTAL CA: CPT

## 2017-03-11 PROCEDURE — 700105 HCHG RX REV CODE 258: Performed by: INTERNAL MEDICINE

## 2017-03-11 PROCEDURE — 94668 MNPJ CHEST WALL SBSQ: CPT

## 2017-03-11 RX ORDER — IPRATROPIUM BROMIDE AND ALBUTEROL SULFATE 2.5; .5 MG/3ML; MG/3ML
3 SOLUTION RESPIRATORY (INHALATION) 4 TIMES DAILY
Status: DISCONTINUED | OUTPATIENT
Start: 2017-03-12 | End: 2017-03-12

## 2017-03-11 RX ADMIN — SIMVASTATIN 10 MG: 20 TABLET, FILM COATED ORAL at 20:56

## 2017-03-11 RX ADMIN — LEVOTHYROXINE SODIUM 75 MCG: 75 TABLET ORAL at 05:22

## 2017-03-11 RX ADMIN — GABAPENTIN 600 MG: 300 CAPSULE ORAL at 20:56

## 2017-03-11 RX ADMIN — GUAIFENESIN 600 MG: 600 TABLET, EXTENDED RELEASE ORAL at 20:57

## 2017-03-11 RX ADMIN — Medication 325 MG: at 18:16

## 2017-03-11 RX ADMIN — AMPICILLIN SODIUM AND SULBACTAM SODIUM 3 G: 2; 1 INJECTION, POWDER, FOR SOLUTION INTRAMUSCULAR; INTRAVENOUS at 10:46

## 2017-03-11 RX ADMIN — TIOTROPIUM BROMIDE 1 CAPSULE: 18 CAPSULE ORAL; RESPIRATORY (INHALATION) at 06:42

## 2017-03-11 RX ADMIN — GABAPENTIN 600 MG: 300 CAPSULE ORAL at 15:21

## 2017-03-11 RX ADMIN — AMPICILLIN SODIUM AND SULBACTAM SODIUM 3 G: 2; 1 INJECTION, POWDER, FOR SOLUTION INTRAMUSCULAR; INTRAVENOUS at 23:23

## 2017-03-11 RX ADMIN — HEPARIN SODIUM 5000 UNITS: 5000 INJECTION, SOLUTION INTRAVENOUS; SUBCUTANEOUS at 15:21

## 2017-03-11 RX ADMIN — HEPARIN SODIUM 5000 UNITS: 5000 INJECTION, SOLUTION INTRAVENOUS; SUBCUTANEOUS at 20:57

## 2017-03-11 RX ADMIN — AMPICILLIN SODIUM AND SULBACTAM SODIUM 3 G: 2; 1 INJECTION, POWDER, FOR SOLUTION INTRAMUSCULAR; INTRAVENOUS at 04:40

## 2017-03-11 RX ADMIN — IPRATROPIUM BROMIDE AND ALBUTEROL SULFATE 3 ML: .5; 3 SOLUTION RESPIRATORY (INHALATION) at 15:01

## 2017-03-11 RX ADMIN — OXYCODONE HYDROCHLORIDE 10 MG: 5 TABLET ORAL at 08:44

## 2017-03-11 RX ADMIN — BUDESONIDE AND FORMOTEROL FUMARATE DIHYDRATE 2 PUFF: 160; 4.5 AEROSOL RESPIRATORY (INHALATION) at 20:58

## 2017-03-11 RX ADMIN — MORPHINE SULFATE 4 MG: 4 INJECTION INTRAVENOUS at 18:20

## 2017-03-11 RX ADMIN — NYSTATIN 1500000 UNITS: 100000 POWDER TOPICAL at 15:22

## 2017-03-11 RX ADMIN — TRAZODONE HYDROCHLORIDE 300 MG: 150 TABLET ORAL at 21:54

## 2017-03-11 RX ADMIN — GABAPENTIN 600 MG: 300 CAPSULE ORAL at 05:22

## 2017-03-11 RX ADMIN — GUAIFENESIN 600 MG: 600 TABLET, EXTENDED RELEASE ORAL at 08:44

## 2017-03-11 RX ADMIN — NYSTATIN 1500000 UNITS: 100000 POWDER TOPICAL at 08:44

## 2017-03-11 RX ADMIN — DULOXETINE HYDROCHLORIDE 60 MG: 60 CAPSULE, DELAYED RELEASE ORAL at 08:44

## 2017-03-11 RX ADMIN — BUDESONIDE AND FORMOTEROL FUMARATE DIHYDRATE 2 PUFF: 160; 4.5 AEROSOL RESPIRATORY (INHALATION) at 08:43

## 2017-03-11 RX ADMIN — Medication 325 MG: at 08:44

## 2017-03-11 RX ADMIN — OXYCODONE HYDROCHLORIDE 10 MG: 5 TABLET ORAL at 15:21

## 2017-03-11 RX ADMIN — NYSTATIN 1500000 UNITS: 100000 POWDER TOPICAL at 20:58

## 2017-03-11 RX ADMIN — IPRATROPIUM BROMIDE AND ALBUTEROL SULFATE 3 ML: .5; 3 SOLUTION RESPIRATORY (INHALATION) at 06:40

## 2017-03-11 RX ADMIN — HEPARIN SODIUM 5000 UNITS: 5000 INJECTION, SOLUTION INTRAVENOUS; SUBCUTANEOUS at 05:23

## 2017-03-11 RX ADMIN — ESCITALOPRAM OXALATE 10 MG: 10 TABLET ORAL at 08:43

## 2017-03-11 RX ADMIN — AZITHROMYCIN 500 MG: 500 INJECTION, POWDER, LYOPHILIZED, FOR SOLUTION INTRAVENOUS at 20:49

## 2017-03-11 RX ADMIN — AMPICILLIN SODIUM AND SULBACTAM SODIUM 3 G: 2; 1 INJECTION, POWDER, FOR SOLUTION INTRAMUSCULAR; INTRAVENOUS at 18:16

## 2017-03-11 RX ADMIN — IPRATROPIUM BROMIDE AND ALBUTEROL SULFATE 3 ML: .5; 3 SOLUTION RESPIRATORY (INHALATION) at 10:20

## 2017-03-11 RX ADMIN — NICOTINE 21 MG: 21 PATCH TRANSDERMAL at 05:25

## 2017-03-11 ASSESSMENT — ENCOUNTER SYMPTOMS
INSOMNIA: 0
DIZZINESS: 0
HEADACHES: 0
EYE PAIN: 0
TINGLING: 0
ABDOMINAL PAIN: 0
PALPITATIONS: 0
SORE THROAT: 0
NAUSEA: 0
COUGH: 1
BACK PAIN: 0
FEVER: 0
DEPRESSION: 0
NECK PAIN: 0
BLURRED VISION: 0
CHILLS: 0
SHORTNESS OF BREATH: 0

## 2017-03-11 ASSESSMENT — PAIN SCALES - GENERAL
PAINLEVEL_OUTOF10: 0
PAINLEVEL_OUTOF10: 7
PAINLEVEL_OUTOF10: 0

## 2017-03-11 NOTE — CARE PLAN
Problem: Safety  Goal: Will remain free from falls  Intervention: Implement fall precautions  Call light and personal belongings within reach. Pt instructed to call for assistance, pt verbalizes understanding. Non skid socks on. Strip alarm in place. Bed in lowest position.       Problem: Pain Management  Goal: Pain level will decrease to patient’s comfort goal  Intervention: Follow pain managment plan developed in collaboration with patient and Interdisciplinary Team  Patient c/o pain. Medications given with good results. Pre and post pain assessment documented.       Problem: Respiratory:  Goal: Respiratory status will improve  Intervention: Administer and titrate oxygen therapy  Patient is on 4L of oxygen per nasal cannula without distress except when ambulating to the bathroom.

## 2017-03-11 NOTE — CARE PLAN
Problem: Safety  Goal: Will remain free from falls  Intervention: Assess risk factors for falls    03/10/17 1400   OTHER   Fall Risk High Risk to Fall - 2 or more points    Risk for Injury-Any positive answers results in the pt being at high risk for fall related injury Not Applicable   Mobility Status Assessment 1-1 Healthcare Provider Required for Assistance with Ambulation & Transfer   History of fall 2   Date of Last Fall 03/07/17   Pt Calls for Assistance Yes           Problem: Pain Management  Goal: Pain level will decrease to patient’s comfort goal  Intervention: Follow pain managment plan developed in collaboration with patient and Interdisciplinary Team  Patient medicated for pain per MAR and PRN need.      Problem: Urinary Elimination:  Goal: Ability to reestablish a normal urinary elimination pattern will improve  Intervention: Assist patient to sit on commode or toilet for voiding  Patient ambulated to the bathroom x4 since arrival on unit at 1400.

## 2017-03-11 NOTE — PROGRESS NOTES
Hospital Medicine Progress Note, Adult, Complex               Author: Angel Menjivar Date & Time created: 3/11/2017  10:43 AM     66yof here with pneumonia/resp failure and sepsis    Interval History:  No acute issues or complaints.  Denies any SOB, wheezing.  Still with productive coughing.    Review of Systems:  Review of Systems   Constitutional: Positive for malaise/fatigue. Negative for fever and chills.   HENT: Negative for sore throat.    Eyes: Negative for blurred vision and pain.   Respiratory: Positive for cough. Negative for shortness of breath.    Cardiovascular: Negative for chest pain and palpitations.   Gastrointestinal: Negative for nausea and abdominal pain.   Genitourinary: Negative for dysuria and urgency.   Musculoskeletal: Negative for back pain and neck pain.   Skin: Negative for itching and rash.   Neurological: Negative for dizziness, tingling and headaches.   Psychiatric/Behavioral: Negative for depression. The patient does not have insomnia.    All other systems reviewed and are negative.      Physical Exam:  Physical Exam   Constitutional: She is oriented to person, place, and time. She appears well-developed and well-nourished. No distress.   HENT:   Right Ear: External ear normal.   Left Ear: External ear normal.   Nose: Nose normal.   Eyes: Conjunctivae are normal. Right eye exhibits no discharge. Left eye exhibits no discharge.   Neck: No JVD present.   Cardiovascular: Regular rhythm and normal heart sounds.    No murmur heard.  Pulmonary/Chest: Effort normal. No stridor. No respiratory distress. She has no wheezes. She has rales.   Abdominal: Soft. Bowel sounds are normal. She exhibits no distension. There is no tenderness.   obese   Musculoskeletal: She exhibits edema. She exhibits no tenderness.   Neurological: She is alert and oriented to person, place, and time.   Skin: Skin is warm and dry. She is not diaphoretic. No erythema.   Psychiatric: She has a normal mood and affect. Her  behavior is normal.   Nursing note and vitals reviewed.      Labs:        Invalid input(s): [id number]      Recent Labs      17   0500  03/10/17   0333  03/11/17   0450   SODIUM  137  134*  136   POTASSIUM  4.4  4.2  4.0   CHLORIDE  107  108  107   CO2  21  21  20   BUN  25*  25*  24*   CREATININE  1.17  1.17  1.04   CALCIUM  8.4*  8.3*  8.8     Recent Labs      17   0500  03/10/17   0333  03/11/17   0450   GLUCOSE  83  88  61*     Recent Labs      17   0500  03/10/17   0333  03/11/17   045   RBC  2.99*  3.05*  3.37*   HEMOGLOBIN  7.4*  7.5*  8.2*   HEMATOCRIT  25.6*  25.4*  28.2*   PLATELETCT  162*  174  202   IRON  <10*   --    --    TOTIRONBC  248*   --    --      Recent Labs      17   0500  03/10/17   0333  03/11/17   0451   WBC  10.8  10.1  10.2   NEUTSPOLYS  81.80*  77.80*  74.20*   LYMPHOCYTES  8.60*  10.20*  12.00*   MONOCYTES  6.20  7.50  9.40   EOSINOPHILS  2.50  3.60  3.10   BASOPHILS  0.30  0.40  0.30           Hemodynamics:  Temp (24hrs), Av.7 °C (98.1 °F), Min:36.2 °C (97.2 °F), Max:37 °C (98.6 °F)  Temperature: 36.7 °C (98.1 °F)  Pulse  Av.6  Min: 58  Max: 94Heart Rate (Monitored): 77  Blood Pressure : 133/62 mmHg, NIBP: 142/64 mmHg    Respiratory:    Respiration: 16, Pulse Oximetry: 94 %, O2 Daily Delivery Respiratory : Silicone Nasal Cannula     Given By:: Mouthpiece, #MDI/DPI Given: MDI/DPI x 1, PEP/CPT Method: Positive Airway Pressure Device, Work Of Breathing / Effort: Mild  RUL Breath Sounds: Expiratory Wheezes, RML Breath Sounds: Expiratory Wheezes, RLL Breath Sounds: Diminished, BIRD Breath Sounds: Expiratory Wheezes, LLL Breath Sounds: Diminished  Fluids:    Intake/Output Summary (Last 24 hours) at 17 1043  Last data filed at 17 0400   Gross per 24 hour   Intake    700 ml   Output    550 ml   Net    150 ml     Weight: 110.6 kg (243 lb 13.3 oz)  GI/Nutrition:  Orders Placed This Encounter   Procedures   • Diet Order     Standing Status: Standing       Number of Occurrences: 1      Standing Expiration Date:      Order Specific Question:  Diet:     Answer:  Diabetic [3]     Medical Decision Making, by Problem:  Active Hospital Problems    Diagnosis   • DM type 2, uncontrolled, with renal complications (CMS-HCC) [E11.29, E11.65]  - cont ssi and monitor      • Morbid obesity (CMS-HCC) [E66.01]     • Hyponatremia [E87.1]  - resolved with IVFs     • Hyperkalemia [E87.5] - resolved     • Acute on chronic renal failure (CMS-HCC) [N17.9, N18.9]- resolved with IVFs; Cr stable     • Acute on chronic respiratory failure (CMS-HCC) [J96.20]  - 2/2 pneumonia. Rt protocol treat pneumonia. Wean o2 as tolerated     • Severe sepsis with septic shock (CMS-HCC) [A41.9, R65.21]  - 2/2 pneumonia with respiratory dysfunction.   - cont abx; weaned off pressors     • Anemia [D64.9]  - normocytic; most likely due to chronic illness  - cont iron supplementation     • CAP (community acquired pneumonia) [J18.9]  - iv abx, pulm toilet, rt protocol, f/u on cultures. Neg to date       • Gastroesophageal reflux disease with esophagitis [K21.0]- ppi   • Chronic constipation [K59.09]- bowel protocol.    • Chronic respiratory failure (CMS-HCC) [J96.10]- on 2L at home.    • EDITH (obstructive sleep apnea) [G47.33]   • Chronic pain syndrome [G89.4]   • Vitamin D deficiency [E55.9]   • Hypothyroidism [E03.9]- cont meds.    • COPD (chronic obstructive pulmonary disease) (CMS-HCC) [J44.9]- no obvious flare. Cont inhalers. Not on steroids. Rt protocol.     • HTN (hypertension) [I10]- benign   Chronic diastolic CHF- grade2- watch for worsening respiratory failure with fluids.   Tenia corporis- Nystatin  Discussed plan with RN      EKG reviewed, Labs reviewed, Medications reviewed and Radiology images reviewed  Mckeon catheter: Critically Ill - Requiring Accurate Measurement of Urinary Output  Central line in place: Concentrated IV drugs    DVT Prophylaxis: Heparin    Ulcer prophylaxis: Yes  Antibiotics:  Treating active infection/contamination beyond 24 hours perioperative coverage  Assessed for rehab: Patient was assess for and/or received rehabilitation services during this hospitalization

## 2017-03-11 NOTE — PROGRESS NOTES
A/OX4, pt uses FWW with 1+ assist to bathroom. Pt is dyspneic on exertion, but still stats at a normal level. Pt c/o 7/10 pain in the back, medicated see MAR. Numbness found in BLE, no tingling.- N/V. Pt is able to suction self when having a productive cough to help get it out. Has abd rash, barrier cream is applied. Morning med pass done at this time, call light within reach, bed in lowest position, hourly rounding set in place.

## 2017-03-11 NOTE — PROGRESS NOTES
Received call report from SICU and accepted care of patient.  Patient currently resting in bed in no visible or stated distress.  Bed controls on and bed in locked position.  Bed alarm on.  Call light and personal possessions within reach.  Plan of care to include monitoring of O2 status, pain management, ACHS blood sugar monitoring, and assistance with ADL's as needed.  Patient verbalizes agreement with plan of care, and has no additional questions or concerns at this time.  Will continue to update notes/plan of care as needed throughout shift.

## 2017-03-12 ENCOUNTER — APPOINTMENT (OUTPATIENT)
Dept: RADIOLOGY | Facility: MEDICAL CENTER | Age: 67
DRG: 871 | End: 2017-03-12
Attending: INTERNAL MEDICINE
Payer: MEDICARE

## 2017-03-12 LAB
ANION GAP SERPL CALC-SCNC: 12 MMOL/L (ref 0–11.9)
BACTERIA BLD CULT: NORMAL
BACTERIA BLD CULT: NORMAL
BUN SERPL-MCNC: 23 MG/DL (ref 8–22)
CALCIUM SERPL-MCNC: 8.9 MG/DL (ref 8.5–10.5)
CHLORIDE SERPL-SCNC: 106 MMOL/L (ref 96–112)
CO2 SERPL-SCNC: 22 MMOL/L (ref 20–33)
CREAT SERPL-MCNC: 1.1 MG/DL (ref 0.5–1.4)
GFR SERPL CREATININE-BSD FRML MDRD: 49 ML/MIN/1.73 M 2
GLUCOSE BLD-MCNC: 106 MG/DL (ref 65–99)
GLUCOSE BLD-MCNC: 121 MG/DL (ref 65–99)
GLUCOSE BLD-MCNC: 150 MG/DL (ref 65–99)
GLUCOSE BLD-MCNC: 92 MG/DL (ref 65–99)
GLUCOSE SERPL-MCNC: 87 MG/DL (ref 65–99)
POTASSIUM SERPL-SCNC: 3.9 MMOL/L (ref 3.6–5.5)
SIGNIFICANT IND 70042: NORMAL
SIGNIFICANT IND 70042: NORMAL
SITE SITE: NORMAL
SITE SITE: NORMAL
SODIUM SERPL-SCNC: 140 MMOL/L (ref 135–145)
SOURCE SOURCE: NORMAL
SOURCE SOURCE: NORMAL

## 2017-03-12 PROCEDURE — 700111 HCHG RX REV CODE 636 W/ 250 OVERRIDE (IP): Performed by: INTERNAL MEDICINE

## 2017-03-12 PROCEDURE — 700101 HCHG RX REV CODE 250: Performed by: INTERNAL MEDICINE

## 2017-03-12 PROCEDURE — 94640 AIRWAY INHALATION TREATMENT: CPT

## 2017-03-12 PROCEDURE — A9270 NON-COVERED ITEM OR SERVICE: HCPCS | Performed by: INTERNAL MEDICINE

## 2017-03-12 PROCEDURE — 36415 COLL VENOUS BLD VENIPUNCTURE: CPT

## 2017-03-12 PROCEDURE — 700102 HCHG RX REV CODE 250 W/ 637 OVERRIDE(OP): Performed by: INTERNAL MEDICINE

## 2017-03-12 PROCEDURE — 82962 GLUCOSE BLOOD TEST: CPT | Mod: 91

## 2017-03-12 PROCEDURE — 80048 BASIC METABOLIC PNL TOTAL CA: CPT

## 2017-03-12 PROCEDURE — 99233 SBSQ HOSP IP/OBS HIGH 50: CPT | Performed by: INTERNAL MEDICINE

## 2017-03-12 PROCEDURE — 700105 HCHG RX REV CODE 258: Performed by: INTERNAL MEDICINE

## 2017-03-12 PROCEDURE — 700102 HCHG RX REV CODE 250 W/ 637 OVERRIDE(OP): Performed by: HOSPITALIST

## 2017-03-12 PROCEDURE — 700111 HCHG RX REV CODE 636 W/ 250 OVERRIDE (IP): Performed by: HOSPITALIST

## 2017-03-12 PROCEDURE — 770006 HCHG ROOM/CARE - MED/SURG/GYN SEMI*

## 2017-03-12 PROCEDURE — A9270 NON-COVERED ITEM OR SERVICE: HCPCS | Performed by: HOSPITALIST

## 2017-03-12 PROCEDURE — 94760 N-INVAS EAR/PLS OXIMETRY 1: CPT

## 2017-03-12 PROCEDURE — 94668 MNPJ CHEST WALL SBSQ: CPT

## 2017-03-12 RX ORDER — IPRATROPIUM BROMIDE AND ALBUTEROL SULFATE 2.5; .5 MG/3ML; MG/3ML
3 SOLUTION RESPIRATORY (INHALATION)
Status: DISCONTINUED | OUTPATIENT
Start: 2017-03-12 | End: 2017-03-14

## 2017-03-12 RX ORDER — ACETAMINOPHEN 325 MG/1
650 TABLET ORAL EVERY 4 HOURS PRN
Status: DISCONTINUED | OUTPATIENT
Start: 2017-03-12 | End: 2017-03-17 | Stop reason: HOSPADM

## 2017-03-12 RX ORDER — CYCLOBENZAPRINE HCL 10 MG
10 TABLET ORAL 3 TIMES DAILY PRN
Status: DISCONTINUED | OUTPATIENT
Start: 2017-03-12 | End: 2017-03-17 | Stop reason: HOSPADM

## 2017-03-12 RX ADMIN — SIMVASTATIN 10 MG: 20 TABLET, FILM COATED ORAL at 20:50

## 2017-03-12 RX ADMIN — HEPARIN SODIUM 5000 UNITS: 5000 INJECTION, SOLUTION INTRAVENOUS; SUBCUTANEOUS at 15:00

## 2017-03-12 RX ADMIN — MORPHINE SULFATE 4 MG: 4 INJECTION INTRAVENOUS at 22:35

## 2017-03-12 RX ADMIN — OXYCODONE HYDROCHLORIDE 10 MG: 5 TABLET ORAL at 04:25

## 2017-03-12 RX ADMIN — GUAIFENESIN 600 MG: 600 TABLET, EXTENDED RELEASE ORAL at 08:12

## 2017-03-12 RX ADMIN — INSULIN GLARGINE 20 UNITS: 100 INJECTION, SOLUTION SUBCUTANEOUS at 21:01

## 2017-03-12 RX ADMIN — LEVOTHYROXINE SODIUM 75 MCG: 75 TABLET ORAL at 07:00

## 2017-03-12 RX ADMIN — ACETAMINOPHEN 650 MG: 325 TABLET, FILM COATED ORAL at 16:16

## 2017-03-12 RX ADMIN — HEPARIN SODIUM 5000 UNITS: 5000 INJECTION, SOLUTION INTRAVENOUS; SUBCUTANEOUS at 05:33

## 2017-03-12 RX ADMIN — AMPICILLIN SODIUM AND SULBACTAM SODIUM 3 G: 2; 1 INJECTION, POWDER, FOR SOLUTION INTRAMUSCULAR; INTRAVENOUS at 05:32

## 2017-03-12 RX ADMIN — HEPARIN SODIUM 5000 UNITS: 5000 INJECTION, SOLUTION INTRAVENOUS; SUBCUTANEOUS at 20:46

## 2017-03-12 RX ADMIN — IPRATROPIUM BROMIDE AND ALBUTEROL SULFATE 3 ML: .5; 3 SOLUTION RESPIRATORY (INHALATION) at 19:28

## 2017-03-12 RX ADMIN — DULOXETINE HYDROCHLORIDE 60 MG: 60 CAPSULE, DELAYED RELEASE ORAL at 08:12

## 2017-03-12 RX ADMIN — TIOTROPIUM BROMIDE 1 CAPSULE: 18 CAPSULE ORAL; RESPIRATORY (INHALATION) at 07:17

## 2017-03-12 RX ADMIN — BUDESONIDE AND FORMOTEROL FUMARATE DIHYDRATE 2 PUFF: 160; 4.5 AEROSOL RESPIRATORY (INHALATION) at 19:31

## 2017-03-12 RX ADMIN — BUDESONIDE AND FORMOTEROL FUMARATE DIHYDRATE 2 PUFF: 160; 4.5 AEROSOL RESPIRATORY (INHALATION) at 07:17

## 2017-03-12 RX ADMIN — AMPICILLIN SODIUM AND SULBACTAM SODIUM 3 G: 2; 1 INJECTION, POWDER, FOR SOLUTION INTRAMUSCULAR; INTRAVENOUS at 21:47

## 2017-03-12 RX ADMIN — NYSTATIN 1500000 UNITS: 100000 POWDER TOPICAL at 15:00

## 2017-03-12 RX ADMIN — GABAPENTIN 600 MG: 300 CAPSULE ORAL at 14:59

## 2017-03-12 RX ADMIN — CYCLOBENZAPRINE HYDROCHLORIDE 10 MG: 10 TABLET, FILM COATED ORAL at 15:39

## 2017-03-12 RX ADMIN — OXYCODONE HYDROCHLORIDE 10 MG: 5 TABLET ORAL at 11:00

## 2017-03-12 RX ADMIN — ESCITALOPRAM OXALATE 10 MG: 10 TABLET ORAL at 08:12

## 2017-03-12 RX ADMIN — GABAPENTIN 600 MG: 300 CAPSULE ORAL at 05:33

## 2017-03-12 RX ADMIN — TRAZODONE HYDROCHLORIDE 300 MG: 150 TABLET ORAL at 20:49

## 2017-03-12 RX ADMIN — IPRATROPIUM BROMIDE AND ALBUTEROL SULFATE 3 ML: .5; 3 SOLUTION RESPIRATORY (INHALATION) at 07:17

## 2017-03-12 RX ADMIN — OXYCODONE HYDROCHLORIDE 10 MG: 5 TABLET ORAL at 20:57

## 2017-03-12 RX ADMIN — NYSTATIN 1500000 UNITS: 100000 POWDER TOPICAL at 08:15

## 2017-03-12 RX ADMIN — AMPICILLIN SODIUM AND SULBACTAM SODIUM 3 G: 2; 1 INJECTION, POWDER, FOR SOLUTION INTRAMUSCULAR; INTRAVENOUS at 11:33

## 2017-03-12 RX ADMIN — IPRATROPIUM BROMIDE AND ALBUTEROL SULFATE 3 ML: .5; 3 SOLUTION RESPIRATORY (INHALATION) at 14:50

## 2017-03-12 RX ADMIN — Medication 325 MG: at 08:12

## 2017-03-12 RX ADMIN — GABAPENTIN 600 MG: 300 CAPSULE ORAL at 20:50

## 2017-03-12 RX ADMIN — NICOTINE 21 MG: 21 PATCH TRANSDERMAL at 05:34

## 2017-03-12 RX ADMIN — GUAIFENESIN 600 MG: 600 TABLET, EXTENDED RELEASE ORAL at 20:50

## 2017-03-12 RX ADMIN — Medication 325 MG: at 17:30

## 2017-03-12 ASSESSMENT — ENCOUNTER SYMPTOMS
COUGH: 1
BLURRED VISION: 0
FEVER: 0
CHILLS: 0
TINGLING: 0
EYE PAIN: 0
NECK PAIN: 0
DIZZINESS: 0
PALPITATIONS: 0
BACK PAIN: 0
SHORTNESS OF BREATH: 0
INSOMNIA: 0
DEPRESSION: 0
NAUSEA: 0
ABDOMINAL PAIN: 0
HEADACHES: 0
SORE THROAT: 0

## 2017-03-12 ASSESSMENT — PAIN SCALES - GENERAL
PAINLEVEL_OUTOF10: 0
PAINLEVEL_OUTOF10: 6
PAINLEVEL_OUTOF10: 7
PAINLEVEL_OUTOF10: 5
PAINLEVEL_OUTOF10: 0
PAINLEVEL_OUTOF10: 6

## 2017-03-12 NOTE — PROGRESS NOTES
Hospital Medicine Progress Note, Adult, Complex               Author: Angel Menjivar Date & Time created: 3/12/2017  10:23 AM     66yof here with pneumonia/resp failure and sepsis    Interval History:  No acute issues or complaints.  Productive coughing improving.    Review of Systems:  Review of Systems   Constitutional: Positive for malaise/fatigue. Negative for fever and chills.   HENT: Negative for sore throat.    Eyes: Negative for blurred vision and pain.   Respiratory: Positive for cough. Negative for shortness of breath.    Cardiovascular: Negative for chest pain and palpitations.   Gastrointestinal: Negative for nausea and abdominal pain.   Genitourinary: Negative for dysuria and urgency.   Musculoskeletal: Negative for back pain and neck pain.   Skin: Negative for itching and rash.   Neurological: Negative for dizziness, tingling and headaches.   Psychiatric/Behavioral: Negative for depression. The patient does not have insomnia.    All other systems reviewed and are negative.      Physical Exam:  Physical Exam   Constitutional: She is oriented to person, place, and time. She appears well-developed and well-nourished. No distress.   HENT:   Right Ear: External ear normal.   Left Ear: External ear normal.   Nose: Nose normal.   Eyes: Conjunctivae are normal. Right eye exhibits no discharge. Left eye exhibits no discharge.   Neck: No JVD present.   Cardiovascular: Regular rhythm and normal heart sounds.    No murmur heard.  Pulmonary/Chest: Effort normal. No stridor. No respiratory distress. She has no wheezes. She has rales.   Abdominal: Soft. Bowel sounds are normal. She exhibits no distension. There is no tenderness.   obese   Musculoskeletal: She exhibits edema. She exhibits no tenderness.   Neurological: She is alert and oriented to person, place, and time.   Skin: Skin is warm and dry. She is not diaphoretic. No erythema.   Psychiatric: She has a normal mood and affect. Her behavior is normal.    Nursing note and vitals reviewed.      Labs:        Invalid input(s): [id number]      Recent Labs      03/10/17   0333  03/11/17   0450  17   0515   SODIUM  134*  136  140   POTASSIUM  4.2  4.0  3.9   CHLORIDE  108  107  106   CO2  21  20  22   BUN  25*  24*  23*   CREATININE  1.17  1.04  1.10   CALCIUM  8.3*  8.8  8.9     Recent Labs      03/10/17   0333  03/11/17   0450  17   0515   GLUCOSE  88  61*  87     Recent Labs      03/10/17   0333  03/11/17   045   RBC  3.05*  3.37*   HEMOGLOBIN  7.5*  8.2*   HEMATOCRIT  25.4*  28.2*   PLATELETCT  174  202     Recent Labs      03/10/17   0333  03/11/17   045   WBC  10.1  10.2   NEUTSPOLYS  77.80*  74.20*   LYMPHOCYTES  10.20*  12.00*   MONOCYTES  7.50  9.40   EOSINOPHILS  3.60  3.10   BASOPHILS  0.40  0.30           Hemodynamics:  Temp (24hrs), Av.6 °C (97.8 °F), Min:36.4 °C (97.5 °F), Max:36.7 °C (98 °F)  Temperature: 36.4 °C (97.5 °F)  Pulse  Av.8  Min: 58  Max: 94   Blood Pressure : 150/57 mmHg    Respiratory:    Respiration: 18, Pulse Oximetry: 96 %, O2 Daily Delivery Respiratory : Silicone Nasal Cannula     Given By:: Mouthpiece, #MDI/DPI Given: MDI/DPI x 2, PEP/CPT Method: Positive Airway Pressure Device, Work Of Breathing / Effort: Mild  RUL Breath Sounds: Clear, RML Breath Sounds: Clear, RLL Breath Sounds: Diminished, BIRD Breath Sounds: Clear, LLL Breath Sounds: Diminished  Fluids:    Intake/Output Summary (Last 24 hours) at 17 1023  Last data filed at 17 0400   Gross per 24 hour   Intake    710 ml   Output      0 ml   Net    710 ml        GI/Nutrition:  Orders Placed This Encounter   Procedures   • Diet Order     Standing Status: Standing      Number of Occurrences: 1      Standing Expiration Date:      Order Specific Question:  Diet:     Answer:  Diabetic [3]     Medical Decision Making, by Problem:  Active Hospital Problems    Diagnosis   • DM type 2, uncontrolled, with renal complications (CMS-HCC) [E11.29, E11.65]  -  cont ssi and monitor      • Morbid obesity (CMS-HCC) [E66.01]     • Hyponatremia [E87.1]  - resolved with IVFs     • Hyperkalemia [E87.5] - resolved     • Acute on chronic renal failure (CMS-HCC) [N17.9, N18.9]- resolved with IVFs; Cr stable     • Acute on chronic respiratory failure (CMS-HCC) [J96.20]  - 2/2 pneumonia. Rt protocol treat pneumonia. Wean o2 as tolerated     • Severe sepsis with septic shock (CMS-HCC) [A41.9, R65.21]  - 2/2 pneumonia with respiratory dysfunction.   - cont abx; weaned off pressors     • Anemia [D64.9]  - normocytic; most likely due to chronic illness  - cont iron supplementation     • CAP (community acquired pneumonia) [J18.9]  - iv abx, pulm toilet, rt protocol, f/u on cultures. Neg to date       • Gastroesophageal reflux disease with esophagitis [K21.0]- ppi   • Chronic constipation [K59.09]- bowel protocol.    • Chronic respiratory failure (CMS-HCC) [J96.10]- on 2L at home.    • EDITH (obstructive sleep apnea) [G47.33]   • Chronic pain syndrome [G89.4]   • Vitamin D deficiency [E55.9]   • Hypothyroidism [E03.9]- cont meds.    • COPD (chronic obstructive pulmonary disease) (CMS-HCC) [J44.9]- no obvious flare. Cont inhalers. Not on steroids. Rt protocol.     • HTN (hypertension) [I10]- benign   Chronic diastolic CHF- grade2- watch for worsening respiratory failure with fluids.   Tenia corporis- Nystatin  Discussed plan with RN      EKG reviewed, Labs reviewed, Medications reviewed and Radiology images reviewed  Mckeon catheter: Critically Ill - Requiring Accurate Measurement of Urinary Output  Central line in place: Concentrated IV drugs    DVT Prophylaxis: Heparin    Ulcer prophylaxis: Yes  Antibiotics: Treating active infection/contamination beyond 24 hours perioperative coverage  Assessed for rehab: Patient was assess for and/or received rehabilitation services during this hospitalization

## 2017-03-13 LAB
ANION GAP SERPL CALC-SCNC: 8 MMOL/L (ref 0–11.9)
BUN SERPL-MCNC: 21 MG/DL (ref 8–22)
CALCIUM SERPL-MCNC: 8.8 MG/DL (ref 8.5–10.5)
CHLORIDE SERPL-SCNC: 109 MMOL/L (ref 96–112)
CO2 SERPL-SCNC: 23 MMOL/L (ref 20–33)
CREAT SERPL-MCNC: 1.08 MG/DL (ref 0.5–1.4)
GFR SERPL CREATININE-BSD FRML MDRD: 51 ML/MIN/1.73 M 2
GLUCOSE BLD-MCNC: 100 MG/DL (ref 65–99)
GLUCOSE BLD-MCNC: 124 MG/DL (ref 65–99)
GLUCOSE BLD-MCNC: 77 MG/DL (ref 65–99)
GLUCOSE BLD-MCNC: 80 MG/DL (ref 65–99)
GLUCOSE SERPL-MCNC: 84 MG/DL (ref 65–99)
POTASSIUM SERPL-SCNC: 3.7 MMOL/L (ref 3.6–5.5)
SODIUM SERPL-SCNC: 140 MMOL/L (ref 135–145)

## 2017-03-13 PROCEDURE — 700102 HCHG RX REV CODE 250 W/ 637 OVERRIDE(OP): Performed by: INTERNAL MEDICINE

## 2017-03-13 PROCEDURE — 700111 HCHG RX REV CODE 636 W/ 250 OVERRIDE (IP): Performed by: INTERNAL MEDICINE

## 2017-03-13 PROCEDURE — G8978 MOBILITY CURRENT STATUS: HCPCS | Mod: CJ

## 2017-03-13 PROCEDURE — A9270 NON-COVERED ITEM OR SERVICE: HCPCS | Performed by: HOSPITALIST

## 2017-03-13 PROCEDURE — 94668 MNPJ CHEST WALL SBSQ: CPT

## 2017-03-13 PROCEDURE — 97162 PT EVAL MOD COMPLEX 30 MIN: CPT

## 2017-03-13 PROCEDURE — 94640 AIRWAY INHALATION TREATMENT: CPT

## 2017-03-13 PROCEDURE — 700105 HCHG RX REV CODE 258: Performed by: INTERNAL MEDICINE

## 2017-03-13 PROCEDURE — G8988 SELF CARE GOAL STATUS: HCPCS | Mod: CI

## 2017-03-13 PROCEDURE — G8979 MOBILITY GOAL STATUS: HCPCS | Mod: CI

## 2017-03-13 PROCEDURE — 700102 HCHG RX REV CODE 250 W/ 637 OVERRIDE(OP): Performed by: HOSPITALIST

## 2017-03-13 PROCEDURE — 700101 HCHG RX REV CODE 250: Performed by: INTERNAL MEDICINE

## 2017-03-13 PROCEDURE — 36415 COLL VENOUS BLD VENIPUNCTURE: CPT

## 2017-03-13 PROCEDURE — A9270 NON-COVERED ITEM OR SERVICE: HCPCS | Performed by: INTERNAL MEDICINE

## 2017-03-13 PROCEDURE — 97165 OT EVAL LOW COMPLEX 30 MIN: CPT

## 2017-03-13 PROCEDURE — 80048 BASIC METABOLIC PNL TOTAL CA: CPT

## 2017-03-13 PROCEDURE — 94760 N-INVAS EAR/PLS OXIMETRY 1: CPT

## 2017-03-13 PROCEDURE — 770006 HCHG ROOM/CARE - MED/SURG/GYN SEMI*

## 2017-03-13 PROCEDURE — G8987 SELF CARE CURRENT STATUS: HCPCS | Mod: CJ

## 2017-03-13 PROCEDURE — 99233 SBSQ HOSP IP/OBS HIGH 50: CPT | Performed by: INTERNAL MEDICINE

## 2017-03-13 PROCEDURE — 82962 GLUCOSE BLOOD TEST: CPT | Mod: 91

## 2017-03-13 RX ADMIN — ACETAMINOPHEN 650 MG: 325 TABLET, FILM COATED ORAL at 13:23

## 2017-03-13 RX ADMIN — HEPARIN SODIUM 5000 UNITS: 5000 INJECTION, SOLUTION INTRAVENOUS; SUBCUTANEOUS at 15:15

## 2017-03-13 RX ADMIN — STANDARDIZED SENNA CONCENTRATE AND DOCUSATE SODIUM 2 TABLET: 8.6; 5 TABLET, FILM COATED ORAL at 07:50

## 2017-03-13 RX ADMIN — ESCITALOPRAM OXALATE 10 MG: 10 TABLET ORAL at 07:50

## 2017-03-13 RX ADMIN — OXYCODONE HYDROCHLORIDE 10 MG: 5 TABLET ORAL at 09:27

## 2017-03-13 RX ADMIN — STANDARDIZED SENNA CONCENTRATE AND DOCUSATE SODIUM 2 TABLET: 8.6; 5 TABLET, FILM COATED ORAL at 21:08

## 2017-03-13 RX ADMIN — GUAIFENESIN 600 MG: 600 TABLET, EXTENDED RELEASE ORAL at 07:49

## 2017-03-13 RX ADMIN — GABAPENTIN 600 MG: 300 CAPSULE ORAL at 05:47

## 2017-03-13 RX ADMIN — AMPICILLIN SODIUM AND SULBACTAM SODIUM 3 G: 2; 1 INJECTION, POWDER, FOR SOLUTION INTRAMUSCULAR; INTRAVENOUS at 05:46

## 2017-03-13 RX ADMIN — NICOTINE 21 MG: 21 PATCH TRANSDERMAL at 05:47

## 2017-03-13 RX ADMIN — Medication 325 MG: at 17:30

## 2017-03-13 RX ADMIN — OXYCODONE HYDROCHLORIDE 10 MG: 5 TABLET ORAL at 15:15

## 2017-03-13 RX ADMIN — BUDESONIDE AND FORMOTEROL FUMARATE DIHYDRATE 2 PUFF: 160; 4.5 AEROSOL RESPIRATORY (INHALATION) at 07:51

## 2017-03-13 RX ADMIN — LEVOTHYROXINE SODIUM 75 MCG: 75 TABLET ORAL at 05:48

## 2017-03-13 RX ADMIN — IPRATROPIUM BROMIDE AND ALBUTEROL SULFATE 3 ML: .5; 3 SOLUTION RESPIRATORY (INHALATION) at 12:48

## 2017-03-13 RX ADMIN — Medication 325 MG: at 07:49

## 2017-03-13 RX ADMIN — GABAPENTIN 600 MG: 300 CAPSULE ORAL at 21:08

## 2017-03-13 RX ADMIN — AMPICILLIN SODIUM AND SULBACTAM SODIUM 3 G: 2; 1 INJECTION, POWDER, FOR SOLUTION INTRAMUSCULAR; INTRAVENOUS at 10:00

## 2017-03-13 RX ADMIN — BUDESONIDE AND FORMOTEROL FUMARATE DIHYDRATE 2 PUFF: 160; 4.5 AEROSOL RESPIRATORY (INHALATION) at 21:07

## 2017-03-13 RX ADMIN — IPRATROPIUM BROMIDE AND ALBUTEROL SULFATE 3 ML: .5; 3 SOLUTION RESPIRATORY (INHALATION) at 16:10

## 2017-03-13 RX ADMIN — NYSTATIN 1 APPLICATION: 100000 POWDER TOPICAL at 09:00

## 2017-03-13 RX ADMIN — GUAIFENESIN 600 MG: 600 TABLET, EXTENDED RELEASE ORAL at 21:08

## 2017-03-13 RX ADMIN — NYSTATIN 1500000 UNITS: 100000 POWDER TOPICAL at 21:10

## 2017-03-13 RX ADMIN — OXYCODONE HYDROCHLORIDE 10 MG: 5 TABLET ORAL at 21:10

## 2017-03-13 RX ADMIN — GABAPENTIN 600 MG: 300 CAPSULE ORAL at 15:15

## 2017-03-13 RX ADMIN — AMPICILLIN SODIUM AND SULBACTAM SODIUM 3 G: 2; 1 INJECTION, POWDER, FOR SOLUTION INTRAMUSCULAR; INTRAVENOUS at 21:01

## 2017-03-13 RX ADMIN — HEPARIN SODIUM 5000 UNITS: 5000 INJECTION, SOLUTION INTRAVENOUS; SUBCUTANEOUS at 05:47

## 2017-03-13 RX ADMIN — TRAZODONE HYDROCHLORIDE 300 MG: 150 TABLET ORAL at 21:18

## 2017-03-13 RX ADMIN — DULOXETINE HYDROCHLORIDE 60 MG: 60 CAPSULE, DELAYED RELEASE ORAL at 07:49

## 2017-03-13 RX ADMIN — IPRATROPIUM BROMIDE AND ALBUTEROL SULFATE 3 ML: .5; 3 SOLUTION RESPIRATORY (INHALATION) at 19:25

## 2017-03-13 RX ADMIN — HEPARIN SODIUM 5000 UNITS: 5000 INJECTION, SOLUTION INTRAVENOUS; SUBCUTANEOUS at 21:08

## 2017-03-13 RX ADMIN — SIMVASTATIN 10 MG: 20 TABLET, FILM COATED ORAL at 21:08

## 2017-03-13 RX ADMIN — NYSTATIN 1500000 UNITS: 100000 POWDER TOPICAL at 15:14

## 2017-03-13 RX ADMIN — AMPICILLIN SODIUM AND SULBACTAM SODIUM 3 G: 2; 1 INJECTION, POWDER, FOR SOLUTION INTRAMUSCULAR; INTRAVENOUS at 15:15

## 2017-03-13 ASSESSMENT — ENCOUNTER SYMPTOMS
NECK PAIN: 0
ABDOMINAL PAIN: 0
DIZZINESS: 0
NAUSEA: 0
FEVER: 0
CHILLS: 0
HEADACHES: 0
BACK PAIN: 0
SHORTNESS OF BREATH: 0
DEPRESSION: 0
EYE PAIN: 0
SORE THROAT: 0
INSOMNIA: 0
BLURRED VISION: 0
PALPITATIONS: 0
COUGH: 1
TINGLING: 0

## 2017-03-13 ASSESSMENT — GAIT ASSESSMENTS
ASSISTIVE DEVICE: FRONT WHEEL WALKER
DISTANCE (FEET): 50
GAIT LEVEL OF ASSIST: CONTACT GUARD ASSIST
DEVIATION: BRADYKINETIC

## 2017-03-13 ASSESSMENT — ACTIVITIES OF DAILY LIVING (ADL): TOILETING: INDEPENDENT

## 2017-03-13 ASSESSMENT — PAIN SCALES - GENERAL
PAINLEVEL_OUTOF10: 0
PAINLEVEL_OUTOF10: 5
PAINLEVEL_OUTOF10: 6
PAINLEVEL_OUTOF10: 4

## 2017-03-13 NOTE — THERAPY
"Physical Therapy Evaluation completed.   Bed Mobility:  Supine to Sit:  (up in chair)  Transfers: Sit to Stand: Contact Guard Assist  Gait: Level Of Assist: Contact Guard Assist with Front-Wheel Walker       Plan of Care: Will benefit from Physical Therapy 3 times per week  Discharge Recommendations: Equipment: Will Continue to Assess for Equipment Needs. Post-acute therapy recommended before/after discharged home.    See \"Rehab Therapy-Acute\" Patient Summary Report for complete documentation.     "

## 2017-03-13 NOTE — PROGRESS NOTES
Hospital Medicine Progress Note, Adult, Complex               Author: Angel Menjivar Date & Time created: 3/13/2017  3:42 PM     66yof here with pneumonia/resp failure and sepsis    Interval History:  No acute issues or complaints.  Still SOB with minimal exertion (walking to door from bed).    Review of Systems:  Review of Systems   Constitutional: Positive for malaise/fatigue. Negative for fever and chills.   HENT: Negative for sore throat.    Eyes: Negative for blurred vision and pain.   Respiratory: Positive for cough. Negative for shortness of breath.    Cardiovascular: Negative for chest pain and palpitations.   Gastrointestinal: Negative for nausea and abdominal pain.   Genitourinary: Negative for dysuria and urgency.   Musculoskeletal: Negative for back pain and neck pain.   Skin: Negative for itching and rash.   Neurological: Negative for dizziness, tingling and headaches.   Psychiatric/Behavioral: Negative for depression. The patient does not have insomnia.    All other systems reviewed and are negative.      Physical Exam:  Physical Exam   Constitutional: She is oriented to person, place, and time. She appears well-developed and well-nourished. No distress.   HENT:   Right Ear: External ear normal.   Left Ear: External ear normal.   Nose: Nose normal.   Eyes: Conjunctivae are normal. Right eye exhibits no discharge. Left eye exhibits no discharge.   Neck: No JVD present.   Cardiovascular: Regular rhythm and normal heart sounds.    No murmur heard.  Pulmonary/Chest: No stridor. No respiratory distress. She has decreased breath sounds. She has no wheezes. She has no rhonchi. She has no rales.   Abdominal: Soft. Bowel sounds are normal. She exhibits no distension. There is no tenderness.   obese   Musculoskeletal: She exhibits edema. She exhibits no tenderness.   Neurological: She is alert and oriented to person, place, and time.   Skin: Skin is warm and dry. She is not diaphoretic. No erythema.    Psychiatric: She has a normal mood and affect. Her behavior is normal.   Nursing note and vitals reviewed.      Labs:        Invalid input(s): [id number]      Recent Labs      17   SODIUM  136  140  140   POTASSIUM  4.0  3.9  3.7   CHLORIDE  107  106  109   CO2  20  22  23   BUN  24*  23*  21   CREATININE  1.04  1.10  1.08   CALCIUM  8.8  8.9  8.8     Recent Labs      17   GLUCOSE  61*  87  84     Recent Labs      17   RBC  3.37*   HEMOGLOBIN  8.2*   HEMATOCRIT  28.2*   PLATELETCT  202     Recent Labs      17   WBC  10.2   NEUTSPOLYS  74.20*   LYMPHOCYTES  12.00*   MONOCYTES  9.40   EOSINOPHILS  3.10   BASOPHILS  0.30           Hemodynamics:  Temp (24hrs), Av.5 °C (97.7 °F), Min:36.3 °C (97.3 °F), Max:36.7 °C (98 °F)  Temperature: 36.3 °C (97.3 °F)  Pulse  Av.7  Min: 58  Max: 94  Blood Pressure : 154/74 mmHg    Respiratory:    Respiration: (!) 24, Pulse Oximetry: 91 %, O2 Daily Delivery Respiratory : Silicone Nasal Cannula     Given By:: Mouthpiece, #MDI/DPI Given: MDI/DPI x 1, PEP/CPT Method: Positive Airway Pressure Device, Work Of Breathing / Effort: Moderate  RUL Breath Sounds: Diminished, RML Breath Sounds: Diminished, RLL Breath Sounds: Diminished, BIRD Breath Sounds: Diminished, LLL Breath Sounds: Diminished  Fluids:    Intake/Output Summary (Last 24 hours) at 17 1542  Last data filed at 17 0900   Gross per 24 hour   Intake    520 ml   Output      0 ml   Net    520 ml        GI/Nutrition:  Orders Placed This Encounter   Procedures   • Diet Order     Standing Status: Standing      Number of Occurrences: 1      Standing Expiration Date:      Order Specific Question:  Diet:     Answer:  Diabetic [3]     Medical Decision Making, by Problem:  Active Hospital Problems    Diagnosis   • DM type 2, uncontrolled, with renal complications (CMS-HCC) [E11.29, E11.65]  - cont ssi  and monitor      • Morbid obesity (CMS-HCC) [E66.01]     • Hyponatremia [E87.1]  - resolved with IVFs     • Hyperkalemia [E87.5] - resolved     • Acute on chronic renal failure (CMS-HCC) [N17.9, N18.9]- resolved with IVFs; Cr stable     • Acute on chronic respiratory failure (CMS-HCC) [J96.20]  - 2/2 pneumonia. Rt protocol treat pneumonia. Wean o2 as tolerated     • Severe sepsis with septic shock (CMS-HCC) [A41.9, R65.21]  - 2/2 pneumonia with respiratory dysfunction.   - cont abx; weaned off pressors     • Anemia [D64.9]  - normocytic; most likely due to chronic illness  - cont iron supplementation     • CAP (community acquired pneumonia) [J18.9]  - iv abx, pulm toilet, rt protocol, f/u on cultures. Neg to date       • Gastroesophageal reflux disease with esophagitis [K21.0]- ppi   • Chronic constipation [K59.09]- bowel protocol.    • Chronic respiratory failure (CMS-HCC) [J96.10]- on 2L at home.    • EDITH (obstructive sleep apnea) [G47.33]   • Chronic pain syndrome [G89.4]   • Vitamin D deficiency [E55.9]   • Hypothyroidism [E03.9]- cont meds.    • COPD (chronic obstructive pulmonary disease) (CMS-HCC) [J44.9]- no obvious flare. Cont inhalers. Not on steroids. Rt protocol.     • HTN (hypertension) [I10]- benign   Chronic diastolic CHF- grade2- watch for worsening respiratory failure with fluids.   Tenia corporis- Nystatin  Discussed plan with RN      EKG reviewed, Labs reviewed, Medications reviewed and Radiology images reviewed  Mckeon catheter: Critically Ill - Requiring Accurate Measurement of Urinary Output  Central line in place: Concentrated IV drugs    DVT Prophylaxis: Heparin    Ulcer prophylaxis: Yes  Antibiotics: Treating active infection/contamination beyond 24 hours perioperative coverage  Assessed for rehab: Patient was assess for and/or received rehabilitation services during this hospitalization

## 2017-03-13 NOTE — FLOWSHEET NOTE
03/12/17 1931   Events/Summary/Plan   Events/Summary/Plan svn/pep/mdi   Interdisciplinary Plan of Care-Goals (Indications)   Obstructive Ventilatory Defect or Pulmonary Disease without Obvious Obstruction History / Diagnosis   Interdisciplinary Plan of Care-Outcomes    Bronchodilator Outcome Patient at Stable Baseline   Education   Education Yes - Pt. / Family has been Instructed in use of Respiratory Equipment   RT Assessment of Delivered Medications   Evaluation of Medication Delivery Daily Yes-- Pt /Family has been Instructed in use of Respiratory Medications and Adverse Reactions   SVN Group   #SVN Performed Yes   MDI/DPI Group   #MDI/DPI Given MDI/DPI x 1   PEP/CPT Group   PEP/CPT/Airway Clearance Therapy Yes   #PEP/CPT (Manual) Subsequent Subsequent   PEP/CPT Method Positive Airway Pressure Device   Incentive Spirometry Group   Breathing Exercises Yes   Incentive Spirometer Volume 750 mL   Chest Exam   Respiration 18   Breath Sounds   RUL Breath Sounds Clear   RML Breath Sounds Clear;Diminished   RLL Breath Sounds Diminished   BIRD Breath Sounds Clear   LLL Breath Sounds Diminished   Oximetry   #Pulse Oximetry (Single Determination) Yes   Oxygen   Pulse Oximetry 96 %   O2 (LPM) 4   O2 Daily Delivery Respiratory  Silicone Nasal Cannula

## 2017-03-13 NOTE — CARE PLAN
Problem: Venous Thromboembolism (VTW)/Deep Vein Thrombosis (DVT) Prevention:  Goal: Patient will participate in Venous Thrombosis (VTE)/Deep Vein Thrombosis (DVT)Prevention Measures  Outcome: PROGRESSING AS EXPECTED  Pt receiving Heparin for dvt ppx      Problem: Pain Management  Goal: Pain level will decrease to patient’s comfort goal  Outcome: PROGRESSING AS EXPECTED  Medicated w/ 10mg Oxy for pain, 4mg of Morphine for breakthrough pain

## 2017-03-13 NOTE — PROGRESS NOTES
AOx4, ambulatory with sba, no N/V, +flatus, normoactive bs, voiding, -BM, reporting back pain that is 6/10, medicated per MAR. Patient continues to have a strong productive cough w/ inspiratory wheezes upon auscultation at beginning of shift. Patient and RN discussed plan of care, all questions answered. Labs noted, assessment complete, patient tolerating diabetic diet. Pt did not require insulin coverage. Call light in place, personal belongings available, bed alarm on, patient educated on importance of calling for assistance, hourly rounding in place. No additional needs at this time. VSS

## 2017-03-13 NOTE — FLOWSHEET NOTE
03/13/17 1612   Events/Summary/Plan   Events/Summary/Plan svn/pep   Interdisciplinary Plan of Care-Goals (Indications)   Obstructive Ventilatory Defect or Pulmonary Disease without Obvious Obstruction History / Diagnosis   Interdisciplinary Plan of Care-Outcomes    Bronchodilator Outcome Patient at Stable Baseline   Education   Education Yes - Pt. / Family has been Instructed in use of Respiratory Equipment   SVN Group   #SVN Performed Yes   Given By: Mouthpiece   PEP/CPT Group   PEP/CPT/Airway Clearance Therapy Yes   #PEP/CPT (Manual) Subsequent Subsequent   PEP/CPT Method Positive Airway Pressure Device   Incentive Spirometry Group   Incentive Spirometry Instruction Yes   Breathing Exercises Yes   Incentive Spirometer Volume 750 mL   Respiratory WDL   Respiratory (WDL) X   Chest Exam   Work Of Breathing / Effort Moderate   Respiration 20   Pulse 79   Breath Sounds   RUL Breath Sounds Diminished   RML Breath Sounds Diminished   RLL Breath Sounds Diminished   BIRD Breath Sounds Diminished   LLL Breath Sounds Diminished   Oximetry   #Pulse Oximetry (Single Determination) Yes   Continuous Oximetry Yes   O2 Alarms Set & Reviewed Yes   Oxygen   Pulse Oximetry 93 %   O2 (LPM) 4   O2 Daily Delivery Respiratory  Silicone Nasal Cannula

## 2017-03-14 ENCOUNTER — APPOINTMENT (OUTPATIENT)
Dept: RADIOLOGY | Facility: MEDICAL CENTER | Age: 67
DRG: 871 | End: 2017-03-14
Attending: FAMILY MEDICINE
Payer: MEDICARE

## 2017-03-14 LAB
ANION GAP SERPL CALC-SCNC: 12 MMOL/L (ref 0–11.9)
BUN SERPL-MCNC: 18 MG/DL (ref 8–22)
CALCIUM SERPL-MCNC: 8.5 MG/DL (ref 8.5–10.5)
CHLORIDE SERPL-SCNC: 109 MMOL/L (ref 96–112)
CO2 SERPL-SCNC: 20 MMOL/L (ref 20–33)
CREAT SERPL-MCNC: 1.1 MG/DL (ref 0.5–1.4)
GFR SERPL CREATININE-BSD FRML MDRD: 49 ML/MIN/1.73 M 2
GLUCOSE BLD-MCNC: 156 MG/DL (ref 65–99)
GLUCOSE BLD-MCNC: 159 MG/DL (ref 65–99)
GLUCOSE BLD-MCNC: 231 MG/DL (ref 65–99)
GLUCOSE BLD-MCNC: 95 MG/DL (ref 65–99)
GLUCOSE SERPL-MCNC: 87 MG/DL (ref 65–99)
POTASSIUM SERPL-SCNC: 4 MMOL/L (ref 3.6–5.5)
SODIUM SERPL-SCNC: 141 MMOL/L (ref 135–145)

## 2017-03-14 PROCEDURE — 700111 HCHG RX REV CODE 636 W/ 250 OVERRIDE (IP): Performed by: INTERNAL MEDICINE

## 2017-03-14 PROCEDURE — A9270 NON-COVERED ITEM OR SERVICE: HCPCS | Performed by: HOSPITALIST

## 2017-03-14 PROCEDURE — 94668 MNPJ CHEST WALL SBSQ: CPT

## 2017-03-14 PROCEDURE — 36415 COLL VENOUS BLD VENIPUNCTURE: CPT

## 2017-03-14 PROCEDURE — A9270 NON-COVERED ITEM OR SERVICE: HCPCS | Performed by: INTERNAL MEDICINE

## 2017-03-14 PROCEDURE — 700111 HCHG RX REV CODE 636 W/ 250 OVERRIDE (IP): Performed by: FAMILY MEDICINE

## 2017-03-14 PROCEDURE — 80048 BASIC METABOLIC PNL TOTAL CA: CPT

## 2017-03-14 PROCEDURE — 700102 HCHG RX REV CODE 250 W/ 637 OVERRIDE(OP): Performed by: INTERNAL MEDICINE

## 2017-03-14 PROCEDURE — 94640 AIRWAY INHALATION TREATMENT: CPT

## 2017-03-14 PROCEDURE — 99407 BEHAV CHNG SMOKING > 10 MIN: CPT

## 2017-03-14 PROCEDURE — 82962 GLUCOSE BLOOD TEST: CPT

## 2017-03-14 PROCEDURE — 700101 HCHG RX REV CODE 250: Performed by: INTERNAL MEDICINE

## 2017-03-14 PROCEDURE — 71010 DX-CHEST-PORTABLE (1 VIEW): CPT

## 2017-03-14 PROCEDURE — 700102 HCHG RX REV CODE 250 W/ 637 OVERRIDE(OP): Performed by: HOSPITALIST

## 2017-03-14 PROCEDURE — 99232 SBSQ HOSP IP/OBS MODERATE 35: CPT | Performed by: FAMILY MEDICINE

## 2017-03-14 PROCEDURE — 94667 MNPJ CHEST WALL 1ST: CPT

## 2017-03-14 PROCEDURE — 770006 HCHG ROOM/CARE - MED/SURG/GYN SEMI*

## 2017-03-14 PROCEDURE — 700105 HCHG RX REV CODE 258: Performed by: INTERNAL MEDICINE

## 2017-03-14 RX ORDER — METHYLPREDNISOLONE SODIUM SUCCINATE 40 MG/ML
40 INJECTION, POWDER, LYOPHILIZED, FOR SOLUTION INTRAMUSCULAR; INTRAVENOUS EVERY 8 HOURS
Status: DISCONTINUED | OUTPATIENT
Start: 2017-03-14 | End: 2017-03-16

## 2017-03-14 RX ORDER — IPRATROPIUM BROMIDE AND ALBUTEROL SULFATE 2.5; .5 MG/3ML; MG/3ML
3 SOLUTION RESPIRATORY (INHALATION)
Status: DISCONTINUED | OUTPATIENT
Start: 2017-03-14 | End: 2017-03-17 | Stop reason: HOSPADM

## 2017-03-14 RX ADMIN — GABAPENTIN 600 MG: 300 CAPSULE ORAL at 13:52

## 2017-03-14 RX ADMIN — AMPICILLIN SODIUM AND SULBACTAM SODIUM 3 G: 2; 1 INJECTION, POWDER, FOR SOLUTION INTRAMUSCULAR; INTRAVENOUS at 16:35

## 2017-03-14 RX ADMIN — AMPICILLIN SODIUM AND SULBACTAM SODIUM 3 G: 2; 1 INJECTION, POWDER, FOR SOLUTION INTRAMUSCULAR; INTRAVENOUS at 05:15

## 2017-03-14 RX ADMIN — LEVOTHYROXINE SODIUM 75 MCG: 75 TABLET ORAL at 05:16

## 2017-03-14 RX ADMIN — GUAIFENESIN 600 MG: 600 TABLET, EXTENDED RELEASE ORAL at 21:10

## 2017-03-14 RX ADMIN — ESCITALOPRAM OXALATE 10 MG: 10 TABLET ORAL at 08:04

## 2017-03-14 RX ADMIN — TIOTROPIUM BROMIDE 1 CAPSULE: 18 CAPSULE ORAL; RESPIRATORY (INHALATION) at 08:03

## 2017-03-14 RX ADMIN — GABAPENTIN 600 MG: 300 CAPSULE ORAL at 21:02

## 2017-03-14 RX ADMIN — CYCLOBENZAPRINE HYDROCHLORIDE 10 MG: 10 TABLET, FILM COATED ORAL at 17:54

## 2017-03-14 RX ADMIN — TRAZODONE HYDROCHLORIDE 300 MG: 150 TABLET ORAL at 21:14

## 2017-03-14 RX ADMIN — NYSTATIN 1500000 UNITS: 100000 POWDER TOPICAL at 08:05

## 2017-03-14 RX ADMIN — Medication 325 MG: at 16:27

## 2017-03-14 RX ADMIN — GUAIFENESIN 600 MG: 600 TABLET, EXTENDED RELEASE ORAL at 08:04

## 2017-03-14 RX ADMIN — AMPICILLIN SODIUM AND SULBACTAM SODIUM 3 G: 2; 1 INJECTION, POWDER, FOR SOLUTION INTRAMUSCULAR; INTRAVENOUS at 21:09

## 2017-03-14 RX ADMIN — INSULIN LISPRO 2 UNITS: 100 INJECTION, SOLUTION INTRAVENOUS; SUBCUTANEOUS at 11:22

## 2017-03-14 RX ADMIN — INSULIN GLARGINE 20 UNITS: 100 INJECTION, SOLUTION SUBCUTANEOUS at 21:18

## 2017-03-14 RX ADMIN — METHYLPREDNISOLONE SODIUM SUCCINATE 40 MG: 40 INJECTION, POWDER, FOR SOLUTION INTRAMUSCULAR; INTRAVENOUS at 21:04

## 2017-03-14 RX ADMIN — HEPARIN SODIUM 5000 UNITS: 5000 INJECTION, SOLUTION INTRAVENOUS; SUBCUTANEOUS at 13:52

## 2017-03-14 RX ADMIN — IPRATROPIUM BROMIDE AND ALBUTEROL SULFATE 3 ML: .5; 3 SOLUTION RESPIRATORY (INHALATION) at 08:03

## 2017-03-14 RX ADMIN — DULOXETINE HYDROCHLORIDE 60 MG: 60 CAPSULE, DELAYED RELEASE ORAL at 08:04

## 2017-03-14 RX ADMIN — HEPARIN SODIUM 5000 UNITS: 5000 INJECTION, SOLUTION INTRAVENOUS; SUBCUTANEOUS at 05:15

## 2017-03-14 RX ADMIN — OXYCODONE HYDROCHLORIDE 10 MG: 5 TABLET ORAL at 09:12

## 2017-03-14 RX ADMIN — INSULIN LISPRO 2 UNITS: 100 INJECTION, SOLUTION INTRAVENOUS; SUBCUTANEOUS at 17:08

## 2017-03-14 RX ADMIN — AMPICILLIN SODIUM AND SULBACTAM SODIUM 3 G: 2; 1 INJECTION, POWDER, FOR SOLUTION INTRAMUSCULAR; INTRAVENOUS at 10:12

## 2017-03-14 RX ADMIN — SIMVASTATIN 10 MG: 20 TABLET, FILM COATED ORAL at 21:02

## 2017-03-14 RX ADMIN — IPRATROPIUM BROMIDE AND ALBUTEROL SULFATE 3 ML: .5; 3 SOLUTION RESPIRATORY (INHALATION) at 11:26

## 2017-03-14 RX ADMIN — NYSTATIN 1500000 UNITS: 100000 POWDER TOPICAL at 13:52

## 2017-03-14 RX ADMIN — BUDESONIDE AND FORMOTEROL FUMARATE DIHYDRATE 2 PUFF: 160; 4.5 AEROSOL RESPIRATORY (INHALATION) at 08:03

## 2017-03-14 RX ADMIN — IPRATROPIUM BROMIDE AND ALBUTEROL SULFATE 3 ML: .5; 3 SOLUTION RESPIRATORY (INHALATION) at 20:24

## 2017-03-14 RX ADMIN — NICOTINE 21 MG: 21 PATCH TRANSDERMAL at 05:14

## 2017-03-14 RX ADMIN — NYSTATIN 1 APPLICATION: 100000 POWDER TOPICAL at 21:00

## 2017-03-14 RX ADMIN — OXYCODONE HYDROCHLORIDE 10 MG: 5 TABLET ORAL at 21:13

## 2017-03-14 RX ADMIN — BUDESONIDE AND FORMOTEROL FUMARATE DIHYDRATE 2 PUFF: 160; 4.5 AEROSOL RESPIRATORY (INHALATION) at 20:28

## 2017-03-14 RX ADMIN — Medication 325 MG: at 08:04

## 2017-03-14 RX ADMIN — INSULIN LISPRO 3 UNITS: 100 INJECTION, SOLUTION INTRAVENOUS; SUBCUTANEOUS at 21:18

## 2017-03-14 RX ADMIN — STANDARDIZED SENNA CONCENTRATE AND DOCUSATE SODIUM 2 TABLET: 8.6; 5 TABLET, FILM COATED ORAL at 08:04

## 2017-03-14 RX ADMIN — METHYLPREDNISOLONE SODIUM SUCCINATE 40 MG: 40 INJECTION, POWDER, FOR SOLUTION INTRAMUSCULAR; INTRAVENOUS at 11:39

## 2017-03-14 RX ADMIN — IPRATROPIUM BROMIDE AND ALBUTEROL SULFATE 3 ML: .5; 3 SOLUTION RESPIRATORY (INHALATION) at 15:06

## 2017-03-14 RX ADMIN — OXYCODONE HYDROCHLORIDE 10 MG: 5 TABLET ORAL at 15:13

## 2017-03-14 RX ADMIN — GABAPENTIN 600 MG: 300 CAPSULE ORAL at 05:16

## 2017-03-14 RX ADMIN — ACETAMINOPHEN 650 MG: 325 TABLET, FILM COATED ORAL at 14:26

## 2017-03-14 ASSESSMENT — ENCOUNTER SYMPTOMS
BLURRED VISION: 0
MYALGIAS: 0
NAUSEA: 0
CHILLS: 0
PHOTOPHOBIA: 0
VOMITING: 0
NECK PAIN: 0
BACK PAIN: 0
SHORTNESS OF BREATH: 1
PALPITATIONS: 0
FEVER: 0
HEARTBURN: 0
DOUBLE VISION: 0
HEADACHES: 0
WEIGHT LOSS: 0
COUGH: 1
TINGLING: 0
DIZZINESS: 0
TREMORS: 0

## 2017-03-14 ASSESSMENT — PAIN SCALES - GENERAL
PAINLEVEL_OUTOF10: 0
PAINLEVEL_OUTOF10: 8
PAINLEVEL_OUTOF10: 5
PAINLEVEL_OUTOF10: 5
PAINLEVEL_OUTOF10: 0

## 2017-03-14 ASSESSMENT — LIFESTYLE VARIABLES
EVER_SMOKED: YES
PACK_YEARS: 40
DO YOU DRINK ALCOHOL: NO

## 2017-03-14 ASSESSMENT — COPD QUESTIONNAIRES
DURING THE PAST 4 WEEKS HOW MUCH DID YOU FEEL SHORT OF BREATH: SOME OF THE TIME
HAVE YOU SMOKED AT LEAST 100 CIGARETTES IN YOUR ENTIRE LIFE: YES
COPD SCREENING SCORE: 7
DO YOU EVER COUGH UP ANY MUCUS OR PHLEGM?: YES, A FEW DAYS A WEEK OR MONTH

## 2017-03-14 NOTE — CARE PLAN
Problem: Respiratory:  Goal: Respiratory status will improve  Intervention: Educate and encourage incentive spirometry usage  Pt using IS effectively

## 2017-03-14 NOTE — THERAPY
"Occupational Therapy Evaluation completed.   Functional Status:  Pt pleasant & motivated.  Pt currently able to perform basic ADL's with SBA but does de sat with exertion & has poor tolerance for standing ADL's.  Pt required 6L O2 during standing ADL's to maintain sats >90%.  Plan of Care: Will benefit from Occupational Therapy 3 times per week  Discharge Recommendations:  Equipment: Will Continue to Assess for Equipment Needs. Post-acute therapy recommended after discharged home.    See \"Rehab Therapy-Acute\" Patient Summary Report for complete documentation.    "

## 2017-03-14 NOTE — FACE TO FACE
Face to Face Supporting Documentation - Home Health    The encounter with this patient was in whole or in part the primary reason for home health admission.    Date of encounter:   Patient:                    MRN:                       YOB: 2018  Priya Callahan  [id number]  1950     Home health to see patient for:  Skilled Nursing care for assessment, interventions & education    Skilled need for:  Exacerbation of Chronic Disease State ACUTE RESP FAILURE    Skilled nursing interventions to include:  Comment: ACUTE RESP FAILURE    Homebound status evidenced by:  Need the aid of supportive devices such as crutches, canes, wheelchairs or walkers. Leaving home requires a considerable and taxing effort. There is a normal inability to leave the home.    Community Physician to provide follow up care: Mickie Lawson M.D.     Optional Interventions? Yes, Details: ACUTE RESP FAILURE      I certify the face to face encounter for this home health care referral meets the CMS requirements and the encounter/clinical assessment with the patient was, in whole, or in part, for the medical condition(s) listed above, which is the primary reason for home health care. Based on my clinical findings: the service(s) are medically necessary, support the need for home health care, and the homebound criteria are met.  I certify that this patient has had a face to face encounter by myself.  Terence Macias M.D. - NPI: [id number]

## 2017-03-14 NOTE — PROGRESS NOTES
Hospital Medicine Progress Note, Adult, Complex               Author: Terence Macias Date & Time created: 3/14/2017  10:59 AM     Interval History:  63YR OLD F WITH  ACUTE ON CHRONIC COPD EXACERBATION AND PNEUMONIA INDUCED SEPSIS    Review of Systems:  Review of Systems   Constitutional: Negative for fever, chills and weight loss.   HENT: Negative for hearing loss and tinnitus.    Eyes: Negative for blurred vision, double vision and photophobia.   Respiratory: Positive for cough and shortness of breath.    Cardiovascular: Negative for chest pain and palpitations.   Gastrointestinal: Negative for heartburn, nausea and vomiting.   Genitourinary: Negative for dysuria, urgency and frequency.   Musculoskeletal: Negative for myalgias, back pain and neck pain.   Skin: Negative for itching and rash.   Neurological: Negative for dizziness, tingling, tremors and headaches.       Physical Exam:  Physical Exam   Constitutional: She is oriented to person, place, and time. She appears distressed (MILD WITH SHORTNESS OF BREATH).   HENT:   Head: Normocephalic and atraumatic.   Eyes: Right eye exhibits no discharge. Left eye exhibits no discharge.   Neck: Neck supple. No JVD present.   Cardiovascular: Normal rate and regular rhythm.    Pulmonary/Chest: No stridor. She is in respiratory distress. She has rales (MILD).   Abdominal: Soft. There is no tenderness. There is no rebound and no guarding.   Musculoskeletal: She exhibits no tenderness.   Neurological: She is alert and oriented to person, place, and time.   Skin: Skin is warm and dry. She is not diaphoretic.       Labs:        Invalid input(s): [id number]      Recent Labs      03/12/17   0515  03/13/17 0449  03/14/17   0456   SODIUM  140  140  141   POTASSIUM  3.9  3.7  4.0   CHLORIDE  106  109  109   CO2  22  23  20   BUN  23*  21  18   CREATININE  1.10  1.08  1.10   CALCIUM  8.9  8.8  8.5     Recent Labs      03/12/17   0515  03/13/17   0449  03/14/17   0456    GLUCOSE  87  84  87     No results for input(s): RBC, HEMOGLOBIN, HEMATOCRIT, PLATELETCT, PROTHROMBTM, APTT, INR, IRON, FERRITIN, TOTIRONBC in the last 72 hours.            Hemodynamics:  Temp (24hrs), Av.7 °C (98.1 °F), Min:36.2 °C (97.2 °F), Max:37.2 °C (99 °F)  Temperature: 37.2 °C (99 °F)  Pulse  Av.9  Min: 58  Max: 97Heart Rate (Monitored): 97  Blood Pressure : (!) 162/81 mmHg    Respiratory:    Respiration: (!) 28, Pulse Oximetry: 90 %, O2 Daily Delivery Respiratory : Silicone Nasal Cannula     Given By:: Mouthpiece, #MDI/DPI Given: MDI/DPI x 2, PEP/CPT Method: Positive Airway Pressure Device, Work Of Breathing / Effort: Moderate  RUL Breath Sounds: Expiratory Wheezes, RML Breath Sounds: Expiratory Wheezes, RLL Breath Sounds: Expiratory Wheezes, BIRD Breath Sounds: Expiratory Wheezes, LLL Breath Sounds: Expiratory Wheezes  Fluids:  No intake or output data in the 24 hours ending 17 1059     GI/Nutrition:  Orders Placed This Encounter   Procedures   • Diet Order     Standing Status: Standing      Number of Occurrences: 1      Standing Expiration Date:      Order Specific Question:  Diet:     Answer:  Diabetic [3]     Medical Decision Making, by Problem:  Active Hospital Problems    Diagnosis   • DM type 2, uncontrolled, with renal complications (CMS-HCC) [E11.29, E11.65]   • Morbid obesity (CMS-HCC) [E66.01]   • Hyponatremia [E87.1]   • Hyperkalemia [E87.5]   • Acute on chronic renal failure (CMS-HCC) [N17.9, N18.9]   • Acute on chronic respiratory failure (CMS-HCC) [J96.20]   • Severe sepsis with septic shock (CMS-HCC) [A41.9, R65.21]   • Anemia [D64.9]   • CAP (community acquired pneumonia) [J18.9]   • Septic shock (CMS-HCC) [A41.9, R65.21]   • Gastroesophageal reflux disease with esophagitis [K21.0]   • Chronic constipation [K59.09]   • Chronic respiratory failure (CMS-HCC) [J96.10]   • EDITH (obstructive sleep apnea) [G47.33]   • Chronic pain syndrome [G89.4]   • Vitamin D deficiency [E55.9]    • Hypothyroidism [E03.9]   • COPD (chronic obstructive pulmonary disease) (CMS-HCC) [J44.9]   • HTN (hypertension) [I10]     66YR OLD F WITH ACUTE ON CHRONIC COPD EXACERBATION/ACUTE RESP FAILURE  STILL VERY SHORT OF BREAT  WILL START SOLUMEDROL  SYMBICORT  DUONEB  PULMONOLOGY INPUT IS NOTED    PNEUMONIA INDUCED SEPSIS  DOING BETTER  UNASYN    HYPONATREMIA   RESOLVED        Mckeon catheter: No Mckeon      DVT Prophylaxis: Heparin

## 2017-03-14 NOTE — RESPIRATORY CARE
COPD EDUCATION by COPD CLINICAL EDUCATOR  3/14/2017  at  10:51 AM by Cristela Martinez     Patient interviewed by COPD education team.  Patient unable to participate in full program.  Short intervention has been conducted.  A comprehensive packet including information about COPD, treatments, and smoking cessation given and discussed.

## 2017-03-15 LAB
ANION GAP SERPL CALC-SCNC: 10 MMOL/L (ref 0–11.9)
ANISOCYTOSIS BLD QL SMEAR: ABNORMAL
BASOPHILS # BLD AUTO: 0.1 % (ref 0–1.8)
BASOPHILS # BLD: 0.01 K/UL (ref 0–0.12)
BUN SERPL-MCNC: 22 MG/DL (ref 8–22)
BURR CELLS BLD QL SMEAR: NORMAL
CALCIUM SERPL-MCNC: 8.8 MG/DL (ref 8.5–10.5)
CHLORIDE SERPL-SCNC: 106 MMOL/L (ref 96–112)
CO2 SERPL-SCNC: 21 MMOL/L (ref 20–33)
COMMENT 1642: NORMAL
CREAT SERPL-MCNC: 1.04 MG/DL (ref 0.5–1.4)
EOSINOPHIL # BLD AUTO: 0 K/UL (ref 0–0.51)
EOSINOPHIL NFR BLD: 0 % (ref 0–6.9)
ERYTHROCYTE [DISTWIDTH] IN BLOOD BY AUTOMATED COUNT: 58.4 FL (ref 35.9–50)
GFR SERPL CREATININE-BSD FRML MDRD: 53 ML/MIN/1.73 M 2
GLUCOSE BLD-MCNC: 155 MG/DL (ref 65–99)
GLUCOSE BLD-MCNC: 166 MG/DL (ref 65–99)
GLUCOSE BLD-MCNC: 168 MG/DL (ref 65–99)
GLUCOSE BLD-MCNC: 218 MG/DL (ref 65–99)
GLUCOSE SERPL-MCNC: 170 MG/DL (ref 65–99)
HCT VFR BLD AUTO: 28.2 % (ref 37–47)
HGB BLD-MCNC: 8 G/DL (ref 12–16)
IMM GRANULOCYTES # BLD AUTO: 0.25 K/UL (ref 0–0.11)
IMM GRANULOCYTES NFR BLD AUTO: 2.7 % (ref 0–0.9)
LYMPHOCYTES # BLD AUTO: 0.61 K/UL (ref 1–4.8)
LYMPHOCYTES NFR BLD: 6.6 % (ref 22–41)
MCH RBC QN AUTO: 24.3 PG (ref 27–33)
MCHC RBC AUTO-ENTMCNC: 28.4 G/DL (ref 33.6–35)
MCV RBC AUTO: 85.7 FL (ref 81.4–97.8)
MICROCYTES BLD QL SMEAR: ABNORMAL
MONOCYTES # BLD AUTO: 0.13 K/UL (ref 0–0.85)
MONOCYTES NFR BLD AUTO: 1.4 % (ref 0–13.4)
MORPHOLOGY BLD-IMP: NORMAL
NEUTROPHILS # BLD AUTO: 8.29 K/UL (ref 2–7.15)
NEUTROPHILS NFR BLD: 89.2 % (ref 44–72)
NRBC # BLD AUTO: 0 K/UL
NRBC BLD AUTO-RTO: 0 /100 WBC
OVALOCYTES BLD QL SMEAR: NORMAL
PLATELET # BLD AUTO: 247 K/UL (ref 164–446)
PLATELET BLD QL SMEAR: NORMAL
PMV BLD AUTO: 9.8 FL (ref 9–12.9)
POIKILOCYTOSIS BLD QL SMEAR: NORMAL
POTASSIUM SERPL-SCNC: 4.4 MMOL/L (ref 3.6–5.5)
RBC # BLD AUTO: 3.29 M/UL (ref 4.2–5.4)
RBC BLD AUTO: PRESENT
SODIUM SERPL-SCNC: 137 MMOL/L (ref 135–145)
WBC # BLD AUTO: 9.3 K/UL (ref 4.8–10.8)

## 2017-03-15 PROCEDURE — A9270 NON-COVERED ITEM OR SERVICE: HCPCS | Performed by: HOSPITALIST

## 2017-03-15 PROCEDURE — 700102 HCHG RX REV CODE 250 W/ 637 OVERRIDE(OP): Performed by: INTERNAL MEDICINE

## 2017-03-15 PROCEDURE — 700102 HCHG RX REV CODE 250 W/ 637 OVERRIDE(OP): Performed by: HOSPITALIST

## 2017-03-15 PROCEDURE — 770006 HCHG ROOM/CARE - MED/SURG/GYN SEMI*

## 2017-03-15 PROCEDURE — 97116 GAIT TRAINING THERAPY: CPT

## 2017-03-15 PROCEDURE — 94760 N-INVAS EAR/PLS OXIMETRY 1: CPT

## 2017-03-15 PROCEDURE — 80048 BASIC METABOLIC PNL TOTAL CA: CPT

## 2017-03-15 PROCEDURE — 94668 MNPJ CHEST WALL SBSQ: CPT

## 2017-03-15 PROCEDURE — A9270 NON-COVERED ITEM OR SERVICE: HCPCS | Performed by: INTERNAL MEDICINE

## 2017-03-15 PROCEDURE — 94640 AIRWAY INHALATION TREATMENT: CPT

## 2017-03-15 PROCEDURE — 85025 COMPLETE CBC W/AUTO DIFF WBC: CPT

## 2017-03-15 PROCEDURE — 36415 COLL VENOUS BLD VENIPUNCTURE: CPT

## 2017-03-15 PROCEDURE — 700111 HCHG RX REV CODE 636 W/ 250 OVERRIDE (IP): Performed by: FAMILY MEDICINE

## 2017-03-15 PROCEDURE — 82962 GLUCOSE BLOOD TEST: CPT | Mod: 91

## 2017-03-15 PROCEDURE — 700101 HCHG RX REV CODE 250: Performed by: FAMILY MEDICINE

## 2017-03-15 PROCEDURE — 99232 SBSQ HOSP IP/OBS MODERATE 35: CPT | Performed by: FAMILY MEDICINE

## 2017-03-15 RX ORDER — FUROSEMIDE 10 MG/ML
20 INJECTION INTRAMUSCULAR; INTRAVENOUS ONCE
Status: COMPLETED | OUTPATIENT
Start: 2017-03-15 | End: 2017-03-15

## 2017-03-15 RX ADMIN — INSULIN LISPRO 3 UNITS: 100 INJECTION, SOLUTION INTRAVENOUS; SUBCUTANEOUS at 12:21

## 2017-03-15 RX ADMIN — OXYCODONE HYDROCHLORIDE 10 MG: 5 TABLET ORAL at 05:45

## 2017-03-15 RX ADMIN — METHYLPREDNISOLONE SODIUM SUCCINATE 40 MG: 40 INJECTION, POWDER, FOR SOLUTION INTRAMUSCULAR; INTRAVENOUS at 05:45

## 2017-03-15 RX ADMIN — INSULIN LISPRO 2 UNITS: 100 INJECTION, SOLUTION INTRAVENOUS; SUBCUTANEOUS at 16:49

## 2017-03-15 RX ADMIN — METHYLPREDNISOLONE SODIUM SUCCINATE 40 MG: 40 INJECTION, POWDER, FOR SOLUTION INTRAMUSCULAR; INTRAVENOUS at 13:26

## 2017-03-15 RX ADMIN — OXYCODONE HYDROCHLORIDE 10 MG: 5 TABLET ORAL at 20:42

## 2017-03-15 RX ADMIN — OXYCODONE HYDROCHLORIDE 10 MG: 5 TABLET ORAL at 13:26

## 2017-03-15 RX ADMIN — NYSTATIN 1500000 UNITS: 100000 POWDER TOPICAL at 20:56

## 2017-03-15 RX ADMIN — BUDESONIDE AND FORMOTEROL FUMARATE DIHYDRATE 2 PUFF: 160; 4.5 AEROSOL RESPIRATORY (INHALATION) at 07:28

## 2017-03-15 RX ADMIN — BUDESONIDE AND FORMOTEROL FUMARATE DIHYDRATE 2 PUFF: 160; 4.5 AEROSOL RESPIRATORY (INHALATION) at 20:46

## 2017-03-15 RX ADMIN — ESCITALOPRAM OXALATE 10 MG: 10 TABLET ORAL at 09:09

## 2017-03-15 RX ADMIN — METHYLPREDNISOLONE SODIUM SUCCINATE 40 MG: 40 INJECTION, POWDER, FOR SOLUTION INTRAMUSCULAR; INTRAVENOUS at 20:43

## 2017-03-15 RX ADMIN — CYCLOBENZAPRINE HYDROCHLORIDE 10 MG: 10 TABLET, FILM COATED ORAL at 20:42

## 2017-03-15 RX ADMIN — INSULIN LISPRO 2 UNITS: 100 INJECTION, SOLUTION INTRAVENOUS; SUBCUTANEOUS at 05:54

## 2017-03-15 RX ADMIN — TIOTROPIUM BROMIDE 1 CAPSULE: 18 CAPSULE ORAL; RESPIRATORY (INHALATION) at 07:29

## 2017-03-15 RX ADMIN — NYSTATIN 1500000 UNITS: 100000 POWDER TOPICAL at 13:26

## 2017-03-15 RX ADMIN — INSULIN GLARGINE 20 UNITS: 100 INJECTION, SOLUTION SUBCUTANEOUS at 20:51

## 2017-03-15 RX ADMIN — GABAPENTIN 600 MG: 300 CAPSULE ORAL at 20:41

## 2017-03-15 RX ADMIN — CYCLOBENZAPRINE HYDROCHLORIDE 10 MG: 10 TABLET, FILM COATED ORAL at 17:04

## 2017-03-15 RX ADMIN — DULOXETINE HYDROCHLORIDE 60 MG: 60 CAPSULE, DELAYED RELEASE ORAL at 09:09

## 2017-03-15 RX ADMIN — TRAZODONE HYDROCHLORIDE 300 MG: 150 TABLET ORAL at 20:42

## 2017-03-15 RX ADMIN — Medication 325 MG: at 09:09

## 2017-03-15 RX ADMIN — NICOTINE 21 MG: 21 PATCH TRANSDERMAL at 05:48

## 2017-03-15 RX ADMIN — GUAIFENESIN 600 MG: 600 TABLET, EXTENDED RELEASE ORAL at 20:41

## 2017-03-15 RX ADMIN — GABAPENTIN 600 MG: 300 CAPSULE ORAL at 13:26

## 2017-03-15 RX ADMIN — LEVOTHYROXINE SODIUM 75 MCG: 75 TABLET ORAL at 05:45

## 2017-03-15 RX ADMIN — FUROSEMIDE 20 MG: 10 INJECTION, SOLUTION INTRAVENOUS at 12:23

## 2017-03-15 RX ADMIN — INSULIN LISPRO 2 UNITS: 100 INJECTION, SOLUTION INTRAVENOUS; SUBCUTANEOUS at 20:50

## 2017-03-15 RX ADMIN — GABAPENTIN 600 MG: 300 CAPSULE ORAL at 05:45

## 2017-03-15 RX ADMIN — GUAIFENESIN 600 MG: 600 TABLET, EXTENDED RELEASE ORAL at 09:09

## 2017-03-15 RX ADMIN — ACETAMINOPHEN 650 MG: 325 TABLET, FILM COATED ORAL at 17:03

## 2017-03-15 RX ADMIN — SIMVASTATIN 10 MG: 20 TABLET, FILM COATED ORAL at 20:41

## 2017-03-15 RX ADMIN — IPRATROPIUM BROMIDE AND ALBUTEROL SULFATE 3 ML: .5; 3 SOLUTION RESPIRATORY (INHALATION) at 10:19

## 2017-03-15 ASSESSMENT — PAIN SCALES - GENERAL
PAINLEVEL_OUTOF10: 3
PAINLEVEL_OUTOF10: 4
PAINLEVEL_OUTOF10: 7
PAINLEVEL_OUTOF10: 5
PAINLEVEL_OUTOF10: 2
PAINLEVEL_OUTOF10: 4
PAINLEVEL_OUTOF10: 6

## 2017-03-15 ASSESSMENT — GAIT ASSESSMENTS
GAIT LEVEL OF ASSIST: STAND BY ASSIST
DEVIATION: BRADYKINETIC
ASSISTIVE DEVICE: FRONT WHEEL WALKER
DISTANCE (FEET): 250

## 2017-03-15 ASSESSMENT — ENCOUNTER SYMPTOMS
TINGLING: 0
MYALGIAS: 0
NECK PAIN: 0
BLURRED VISION: 0
COUGH: 1
DIZZINESS: 0
CHILLS: 0
HEARTBURN: 0
PALPITATIONS: 0
SHORTNESS OF BREATH: 1
DOUBLE VISION: 0
HEADACHES: 0
BACK PAIN: 0
ORTHOPNEA: 1
NAUSEA: 0
TREMORS: 0
PHOTOPHOBIA: 0
VOMITING: 0
FEVER: 0
WEIGHT LOSS: 0

## 2017-03-15 NOTE — CARE PLAN
Problem: Safety  Goal: Will remain free from injury  Outcome: PROGRESSING AS EXPECTED  Fall prec in place. Bed alarm is on. Call light within reach.     Problem: Respiratory:  Goal: Respiratory status will improve  Outcome: PROGRESSING AS EXPECTED  Pt. Now on Iv steroids, breathing much improve today compared yesterday. RT protocol on board.     Problem: Skin Integrity  Goal: Risk for impaired skin integrity will decrease  Outcome: PROGRESSING AS EXPECTED  Pt. Able to get up with standby assistance.

## 2017-03-15 NOTE — PROGRESS NOTES
Assumed care of the pt. Awake with A&Ox4, pt. On oxygen via NC at 4LPM right now. Able to get up with 1 person standby to the bathroom. Tolerates well. Pt. Has intact PIV on L hand on saline lock. Getting IV steroids. Pt. Tolerates oral meds. Lung assx done. Fall prec in place. Bed alarm on , treaded socks on. Pt. Able to call for assistance. Needs attended.

## 2017-03-15 NOTE — PROGRESS NOTES
Hospital Medicine Progress Note, Adult, Complex               Author: Terence Macias Date & Time created: 3/15/2017  10:47 AM     Interval History:    63YR OLD F WITH  ACUTE ON CHRONIC COPD EXACERBATION AND PNEUMONIA INDUCED SEPSIS    Review of Systems:  Review of Systems   Constitutional: Negative for fever, chills and weight loss.   HENT: Negative for hearing loss and tinnitus.    Eyes: Negative for blurred vision, double vision and photophobia.   Respiratory: Positive for cough and shortness of breath.    Cardiovascular: Positive for orthopnea. Negative for chest pain and palpitations.   Gastrointestinal: Negative for heartburn, nausea and vomiting.   Genitourinary: Negative for dysuria, urgency and frequency.   Musculoskeletal: Negative for myalgias, back pain and neck pain.   Skin: Negative for itching and rash.   Neurological: Negative for dizziness, tingling, tremors and headaches.       Physical Exam:  Physical Exam   Constitutional: She is oriented to person, place, and time. No distress.   HENT:   Head: Normocephalic and atraumatic.   Eyes: Right eye exhibits no discharge. Left eye exhibits discharge.   Neck: Neck supple. No JVD present.   Cardiovascular: Normal rate and regular rhythm.    Pulmonary/Chest: No stridor. She is in respiratory distress (MUCH BETTER THEN YESTERDAY). She exhibits no tenderness.   Abdominal: She exhibits no distension. There is no tenderness. There is no rebound.   Musculoskeletal: She exhibits edema (BOTH LEGS). She exhibits no tenderness.   Neurological: She is alert and oriented to person, place, and time.   Skin: Skin is warm and dry. She is not diaphoretic.       Labs:        Invalid input(s): [id number]      Recent Labs      03/13/17   0449  03/14/17   0456  03/15/17   0510   SODIUM  140  141  137   POTASSIUM  3.7  4.0  4.4   CHLORIDE  109  109  106   CO2  23  20  21   BUN  21  18  22   CREATININE  1.08  1.10  1.04   CALCIUM  8.8  8.5  8.8     Recent Labs       17   0449  17   0456  03/15/17   0510   GLUCOSE  84  87  170*     Recent Labs      03/15/17   0510   RBC  3.29*   HEMOGLOBIN  8.0*   HEMATOCRIT  28.2*   PLATELETCT  247     Recent Labs      03/15/17   0510   WBC  9.3   NEUTSPOLYS  89.20*   LYMPHOCYTES  6.60*   MONOCYTES  1.40   EOSINOPHILS  0.00   BASOPHILS  0.10           Hemodynamics:  Temp (24hrs), Av.9 °C (98.5 °F), Min:36.5 °C (97.7 °F), Max:38.1 °C (100.6 °F)  Temperature: 36.6 °C (97.8 °F)  Pulse  Av.1  Min: 58  Max: 97Heart Rate (Monitored): 75  Blood Pressure : 149/72 mmHg    Respiratory:    Respiration: (!) 24, Pulse Oximetry: 91 %, O2 Daily Delivery Respiratory : Silicone Nasal Cannula     Given By:: Mouthpiece, #MDI/DPI Given: MDI/DPI x 2, PEP/CPT Method: Positive Airway Pressure Device, Work Of Breathing / Effort: Moderate  RUL Breath Sounds: Expiratory Wheezes, RML Breath Sounds: Expiratory Wheezes, RLL Breath Sounds: Diminished, BIRD Breath Sounds: Expiratory Wheezes, LLL Breath Sounds: Diminished  Fluids:    Intake/Output Summary (Last 24 hours) at 03/15/17 1047  Last data filed at 03/15/17 1011   Gross per 24 hour   Intake    900 ml   Output      0 ml   Net    900 ml        GI/Nutrition:  Orders Placed This Encounter   Procedures   • Diet Order     Standing Status: Standing      Number of Occurrences: 1      Standing Expiration Date:      Order Specific Question:  Diet:     Answer:  Diabetic [3]     Medical Decision Making, by Problem:  Active Hospital Problems    Diagnosis   • DM type 2, uncontrolled, with renal complications (CMS-HCC) [E11.29, E11.65]   • Morbid obesity (CMS-HCC) [E66.01]   • Hyponatremia [E87.1]   • Hyperkalemia [E87.5]   • Acute on chronic renal failure (CMS-[facility]) [N17.9, N18.9]   • Acute on chronic respiratory failure (CMS-HCC) [J96.20]   • Severe sepsis with septic shock (CMS-HCC) [A41.9, R65.21]   • Anemia [D64.9]   • CAP (community acquired pneumonia) [J18.9]   • Septic shock (CMS-HCC) [A41.9, R65.21]   •  Gastroesophageal reflux disease with esophagitis [K21.0]   • Chronic constipation [K59.09]   • Chronic respiratory failure (CMS-HCC) [J96.10]   • EDITH (obstructive sleep apnea) [G47.33]   • Chronic pain syndrome [G89.4]   • Vitamin D deficiency [E55.9]   • Hypothyroidism [E03.9]   • COPD (chronic obstructive pulmonary disease) (CMS-HCC) [J44.9]   • HTN (hypertension) [I10]   66YR OLD F WITH ACUTE ON CHRONIC COPD EXACERBATION/ACUTE RESP FAILURE  PRESENT AT THE TIME OF ADMISSION  A BIT BETTER TODAY  WILL START SOLUMEDROL  SYMBICORT  DUONEB  PULMONOLOGY INPUT IS NOTED    ?OF CHF  CXRAY RESULT IS NOTED  WILL DO CARDIAC ECHO  WILL GIVE 1 DOSE OF LASIX IV    PNEUMONIA INDUCED SEPSIS  DOING BETTER  UNASYN FINISHED YESTERDAY    HYPONATREMIA    RESOLVED          Mckeon catheter: No Mckeon

## 2017-03-15 NOTE — PROGRESS NOTES
Pt. Refuses Bed Alarm on, she is coherent with A&Ox4. Educated on the importance of using the equipment to prevent unnecessary falls. Understood the teaching but still refuses BA. Instructed on the use of call light button, and make sure to call for assistance when attempting to get out of bed. Demonstrated proper use of call light button, and to call phone numbers on comm board for assistance.

## 2017-03-15 NOTE — CARE PLAN
Problem: Safety  Goal: Will remain free from injury  Outcome: PROGRESSING AS EXPECTED  Bed alarm on. Bed in low and locked position. Call light and belongings within reach. Hourly rounding in place.     Problem: Pain Management  Goal: Pain level will decrease to patient’s comfort goal  Outcome: PROGRESSING AS EXPECTED  Pain medication as needed per MD order for adequate pain control.

## 2017-03-15 NOTE — PROGRESS NOTES
Received report and assumed care of the patient. Patient is alert and oriented x4. Bed alarm on. Bed in low and locked position. Call light and belongings within reach. Treaded socks on. Plan of care discussed with the patient. Hourly rounding in place.

## 2017-03-15 NOTE — THERAPY
"Physical Therapy Treatment completed.   Bed Mobility:  Supine to Sit: Modified Independent  Transfers: Sit to Stand: Stand by Assist  Gait: Level Of Assist: Stand by Assist with Front-Wheel Walker       Plan of Care: Will benefit from Physical Therapy 3 times per week  Discharge Recommendations: Equipment: Will Continue to Assess for Equipment Needs. Post-acute therapy recommended after discharged home. Possible home with  services     See \"Rehab Therapy-Acute\" Patient Summary Report for complete documentation.       "

## 2017-03-16 LAB
ANION GAP SERPL CALC-SCNC: 7 MMOL/L (ref 0–11.9)
BASOPHILS # BLD AUTO: 0.1 % (ref 0–1.8)
BASOPHILS # BLD: 0.01 K/UL (ref 0–0.12)
BUN SERPL-MCNC: 32 MG/DL (ref 8–22)
CALCIUM SERPL-MCNC: 8.8 MG/DL (ref 8.5–10.5)
CHLORIDE SERPL-SCNC: 105 MMOL/L (ref 96–112)
CO2 SERPL-SCNC: 26 MMOL/L (ref 20–33)
CREAT SERPL-MCNC: 1.22 MG/DL (ref 0.5–1.4)
EOSINOPHIL # BLD AUTO: 0 K/UL (ref 0–0.51)
EOSINOPHIL NFR BLD: 0 % (ref 0–6.9)
ERYTHROCYTE [DISTWIDTH] IN BLOOD BY AUTOMATED COUNT: 58 FL (ref 35.9–50)
GFR SERPL CREATININE-BSD FRML MDRD: 44 ML/MIN/1.73 M 2
GLUCOSE BLD-MCNC: 130 MG/DL (ref 65–99)
GLUCOSE BLD-MCNC: 133 MG/DL (ref 65–99)
GLUCOSE BLD-MCNC: 201 MG/DL (ref 65–99)
GLUCOSE BLD-MCNC: 294 MG/DL (ref 65–99)
GLUCOSE SERPL-MCNC: 153 MG/DL (ref 65–99)
HCT VFR BLD AUTO: 26.5 % (ref 37–47)
HGB BLD-MCNC: 7.8 G/DL (ref 12–16)
IMM GRANULOCYTES # BLD AUTO: 0.23 K/UL (ref 0–0.11)
IMM GRANULOCYTES NFR BLD AUTO: 1.6 % (ref 0–0.9)
LV EJECT FRACT  99904: 60
LV EJECT FRACT MOD 2C 99903: 60.35
LV EJECT FRACT MOD 4C 99902: 63.05
LV EJECT FRACT MOD BP 99901: 61.44
LYMPHOCYTES # BLD AUTO: 0.72 K/UL (ref 1–4.8)
LYMPHOCYTES NFR BLD: 5.1 % (ref 22–41)
MCH RBC QN AUTO: 25 PG (ref 27–33)
MCHC RBC AUTO-ENTMCNC: 29.4 G/DL (ref 33.6–35)
MCV RBC AUTO: 84.9 FL (ref 81.4–97.8)
MONOCYTES # BLD AUTO: 0.21 K/UL (ref 0–0.85)
MONOCYTES NFR BLD AUTO: 1.5 % (ref 0–13.4)
NEUTROPHILS # BLD AUTO: 12.86 K/UL (ref 2–7.15)
NEUTROPHILS NFR BLD: 91.7 % (ref 44–72)
NRBC # BLD AUTO: 0 K/UL
NRBC BLD AUTO-RTO: 0 /100 WBC
PLATELET # BLD AUTO: 283 K/UL (ref 164–446)
PMV BLD AUTO: 9.5 FL (ref 9–12.9)
POTASSIUM SERPL-SCNC: 4.4 MMOL/L (ref 3.6–5.5)
RBC # BLD AUTO: 3.12 M/UL (ref 4.2–5.4)
SODIUM SERPL-SCNC: 138 MMOL/L (ref 135–145)
WBC # BLD AUTO: 14 K/UL (ref 4.8–10.8)

## 2017-03-16 PROCEDURE — 93306 TTE W/DOPPLER COMPLETE: CPT

## 2017-03-16 PROCEDURE — 99232 SBSQ HOSP IP/OBS MODERATE 35: CPT | Performed by: FAMILY MEDICINE

## 2017-03-16 PROCEDURE — 770006 HCHG ROOM/CARE - MED/SURG/GYN SEMI*

## 2017-03-16 PROCEDURE — A9270 NON-COVERED ITEM OR SERVICE: HCPCS | Performed by: INTERNAL MEDICINE

## 2017-03-16 PROCEDURE — 85025 COMPLETE CBC W/AUTO DIFF WBC: CPT

## 2017-03-16 PROCEDURE — 36415 COLL VENOUS BLD VENIPUNCTURE: CPT

## 2017-03-16 PROCEDURE — 94668 MNPJ CHEST WALL SBSQ: CPT

## 2017-03-16 PROCEDURE — A9270 NON-COVERED ITEM OR SERVICE: HCPCS | Performed by: HOSPITALIST

## 2017-03-16 PROCEDURE — 700111 HCHG RX REV CODE 636 W/ 250 OVERRIDE (IP): Performed by: INTERNAL MEDICINE

## 2017-03-16 PROCEDURE — 700111 HCHG RX REV CODE 636 W/ 250 OVERRIDE (IP): Performed by: FAMILY MEDICINE

## 2017-03-16 PROCEDURE — 700102 HCHG RX REV CODE 250 W/ 637 OVERRIDE(OP): Performed by: INTERNAL MEDICINE

## 2017-03-16 PROCEDURE — 93306 TTE W/DOPPLER COMPLETE: CPT | Mod: 26 | Performed by: INTERNAL MEDICINE

## 2017-03-16 PROCEDURE — 97535 SELF CARE MNGMENT TRAINING: CPT

## 2017-03-16 PROCEDURE — 80048 BASIC METABOLIC PNL TOTAL CA: CPT

## 2017-03-16 PROCEDURE — 700102 HCHG RX REV CODE 250 W/ 637 OVERRIDE(OP): Performed by: HOSPITALIST

## 2017-03-16 PROCEDURE — 82962 GLUCOSE BLOOD TEST: CPT

## 2017-03-16 RX ORDER — HYDRALAZINE HYDROCHLORIDE 20 MG/ML
10 INJECTION INTRAMUSCULAR; INTRAVENOUS EVERY 4 HOURS PRN
Status: DISCONTINUED | OUTPATIENT
Start: 2017-03-16 | End: 2017-03-17 | Stop reason: HOSPADM

## 2017-03-16 RX ORDER — METHYLPREDNISOLONE SODIUM SUCCINATE 40 MG/ML
40 INJECTION, POWDER, LYOPHILIZED, FOR SOLUTION INTRAMUSCULAR; INTRAVENOUS EVERY 12 HOURS
Status: DISCONTINUED | OUTPATIENT
Start: 2017-03-16 | End: 2017-03-17 | Stop reason: HOSPADM

## 2017-03-16 RX ORDER — FUROSEMIDE 10 MG/ML
40 INJECTION INTRAMUSCULAR; INTRAVENOUS ONCE
Status: COMPLETED | OUTPATIENT
Start: 2017-03-16 | End: 2017-03-16

## 2017-03-16 RX ADMIN — SIMVASTATIN 10 MG: 20 TABLET, FILM COATED ORAL at 20:51

## 2017-03-16 RX ADMIN — LEVOTHYROXINE SODIUM 75 MCG: 75 TABLET ORAL at 05:55

## 2017-03-16 RX ADMIN — INSULIN GLARGINE 20 UNITS: 100 INJECTION, SOLUTION SUBCUTANEOUS at 20:44

## 2017-03-16 RX ADMIN — OXYCODONE HYDROCHLORIDE 10 MG: 5 TABLET ORAL at 05:55

## 2017-03-16 RX ADMIN — GABAPENTIN 600 MG: 300 CAPSULE ORAL at 05:56

## 2017-03-16 RX ADMIN — Medication 325 MG: at 17:23

## 2017-03-16 RX ADMIN — ACETAMINOPHEN 650 MG: 325 TABLET, FILM COATED ORAL at 20:08

## 2017-03-16 RX ADMIN — GABAPENTIN 600 MG: 300 CAPSULE ORAL at 20:51

## 2017-03-16 RX ADMIN — STANDARDIZED SENNA CONCENTRATE AND DOCUSATE SODIUM 2 TABLET: 8.6; 5 TABLET, FILM COATED ORAL at 20:50

## 2017-03-16 RX ADMIN — DULOXETINE HYDROCHLORIDE 60 MG: 60 CAPSULE, DELAYED RELEASE ORAL at 07:46

## 2017-03-16 RX ADMIN — NICOTINE 21 MG: 21 PATCH TRANSDERMAL at 06:03

## 2017-03-16 RX ADMIN — CYCLOBENZAPRINE HYDROCHLORIDE 10 MG: 10 TABLET, FILM COATED ORAL at 20:08

## 2017-03-16 RX ADMIN — BUDESONIDE AND FORMOTEROL FUMARATE DIHYDRATE 2 PUFF: 160; 4.5 AEROSOL RESPIRATORY (INHALATION) at 07:26

## 2017-03-16 RX ADMIN — HYDRALAZINE HYDROCHLORIDE 10 MG: 20 INJECTION INTRAMUSCULAR; INTRAVENOUS at 18:00

## 2017-03-16 RX ADMIN — TIOTROPIUM BROMIDE 1 CAPSULE: 18 CAPSULE ORAL; RESPIRATORY (INHALATION) at 07:26

## 2017-03-16 RX ADMIN — METHYLPREDNISOLONE SODIUM SUCCINATE 40 MG: 40 INJECTION, POWDER, FOR SOLUTION INTRAMUSCULAR; INTRAVENOUS at 06:00

## 2017-03-16 RX ADMIN — GUAIFENESIN 600 MG: 600 TABLET, EXTENDED RELEASE ORAL at 07:47

## 2017-03-16 RX ADMIN — FUROSEMIDE 40 MG: 10 INJECTION, SOLUTION INTRAVENOUS at 14:31

## 2017-03-16 RX ADMIN — METHYLPREDNISOLONE SODIUM SUCCINATE 40 MG: 40 INJECTION, POWDER, FOR SOLUTION INTRAMUSCULAR; INTRAVENOUS at 20:49

## 2017-03-16 RX ADMIN — GUAIFENESIN 600 MG: 600 TABLET, EXTENDED RELEASE ORAL at 20:50

## 2017-03-16 RX ADMIN — OXYCODONE HYDROCHLORIDE 10 MG: 5 TABLET ORAL at 21:05

## 2017-03-16 RX ADMIN — GABAPENTIN 600 MG: 300 CAPSULE ORAL at 14:32

## 2017-03-16 RX ADMIN — CYCLOBENZAPRINE HYDROCHLORIDE 10 MG: 10 TABLET, FILM COATED ORAL at 05:57

## 2017-03-16 RX ADMIN — INSULIN LISPRO 3 UNITS: 100 INJECTION, SOLUTION INTRAVENOUS; SUBCUTANEOUS at 20:44

## 2017-03-16 RX ADMIN — ESCITALOPRAM OXALATE 10 MG: 10 TABLET ORAL at 07:46

## 2017-03-16 RX ADMIN — NYSTATIN 1500000 UNITS: 100000 POWDER TOPICAL at 07:46

## 2017-03-16 RX ADMIN — INSULIN LISPRO 5 UNITS: 100 INJECTION, SOLUTION INTRAVENOUS; SUBCUTANEOUS at 11:04

## 2017-03-16 RX ADMIN — BUDESONIDE AND FORMOTEROL FUMARATE DIHYDRATE 2 PUFF: 160; 4.5 AEROSOL RESPIRATORY (INHALATION) at 18:59

## 2017-03-16 RX ADMIN — OXYCODONE HYDROCHLORIDE 10 MG: 5 TABLET ORAL at 14:37

## 2017-03-16 ASSESSMENT — PAIN SCALES - GENERAL
PAINLEVEL_OUTOF10: 0
PAINLEVEL_OUTOF10: 5
PAINLEVEL_OUTOF10: 0
PAINLEVEL_OUTOF10: 6
PAINLEVEL_OUTOF10: 6
PAINLEVEL_OUTOF10: 0
PAINLEVEL_OUTOF10: 0

## 2017-03-16 ASSESSMENT — ENCOUNTER SYMPTOMS
HEARTBURN: 0
HEADACHES: 0
WEIGHT LOSS: 0
BACK PAIN: 0
MYALGIAS: 0
BLURRED VISION: 0
TINGLING: 0
FEVER: 0
DIZZINESS: 0
DOUBLE VISION: 0
NECK PAIN: 0
VOMITING: 0
CLAUDICATION: 0
NAUSEA: 0
SHORTNESS OF BREATH: 1
TREMORS: 0
CHILLS: 0
PHOTOPHOBIA: 0

## 2017-03-16 NOTE — PROGRESS NOTES
"Pulmonary Critical Care Progress Note        Name:Priya Callahan  MRN:[id number]  Date of Service: 3/16/17  Referring physician: Terence Macias M.D.    Chief Complaint: SOB    History of Present Illness: As per initial consultation note: \"66 y.o. female who has been feeling congested for the last few days. She lives in [place].  She uses a wheelchair intermittently to get around.  She has chronic back pain.  Today, she fell at home on 2 occasions because she was weak.  She has a cough with chest congestion and is producing some cream-colored sputum.  She denies hemoptysis.  She denies any chest pain or chest pressure.  She has no nausea, vomiting or diarrhea.  She has had poor oral intake today because she could not get food or water.  She denies dysuria, urgency, frequency or hematuria.  She presented to the emergency room.  She is on vasopressor  support.  She was hypotensive and has received over 4 liters of intravenous  crystalloid solution.\"   Course c/b septic shock, brief vasopressor requirement and transferred out of ICU on 3/11/17.    ROS:  Respiratory: positive cough, negative hemoptysis, negative pleuritic chest pain, negative shortness of breath, positive sputum production and negative wheezing, Cardiac: negative, GI: negative.  All other systems negative.    Interval Events:  24 hour interval history reviewed  3.5LPM NC  No complaints this AM  Ambulated around nursing station- limited by back pain but slightly SOB  At home, minimal ambulation due to lumbar stenosis  On 3LPM nocturnally, use on exertion PRN  Significant seasonal allergies- not currently  Cough present- productive of yellow brown phlegm, expectorating without difficulty. Feels this is improving daily  Wants to go home    PFSH:  No change.    Respiratory:     Pulse Oximetry: 94 %  Chest Tube Drains:          Exam: unlabored respirations, no intercostal retractions or accessory muscle use and clear to auscultation without rales " or wheezes  ImagingAvailable data reviewed         Invalid input(s): [id number]    HemoDynamics:  Pulse: 84  Blood Pressure : 154/74 mmHg      Exam: regular rate and rhythm, regular rhythm (Sinus)  Imaging: Available data reviewed        Neuro:  GCS Total [place] Coma Score: 15       Exam: no focal deficits noted mental status intact  Imaging: Available data reviewed    Fluids:  Intake/Output       17 0700 - 03/15/17 0659 03/15/17 0700 - 17 0659 17 07 - 17 0659      [id number] 0585-8181 Total 4549-8647 2475-8484 Total 4990-2341 2471-2743 Total       Intake    P.O.  --  600 600  500  720 1220  320  -- 320    P.O. -- 600 600  320 -- 320    Total Intake -- 600 600  320 -- 320       Output    Urine  --  -- --  --  -- --  --  -- --    Number of Times Voided 2 x -- 2 x -- -- -- -- -- --    Total Output -- -- -- -- -- -- -- -- --       Net I/O     -- 600 600  320 -- 320           Recent Labs      17   0456  03/15/17   0510  17   0454   SODIUM  141  137  138   POTASSIUM  4.0  4.4  4.4   CHLORIDE  109  106  105   CO2  20  21  26   BUN  18  22  32*   CREATININE  1.10  1.04  1.22   CALCIUM  8.5  8.8  8.8       GI/Nutrition:  Exam: abdomen is soft and non-tender, normal active bowel sounds  Imaging: Available data reviewed  taking PO  Liver Function  Recent Labs      17   0456  03/15/17   0510  17   045   GLUCOSE  87  170*  153*       Heme:  Recent Labs      03/15/17   0510  17   045   RBC  3.29*  3.12*   HEMOGLOBIN  8.0*  7.8*   HEMATOCRIT  28.2*  26.5*   PLATELETCT  247  283       Infectious Disease:  Temp  Av.6 °C (97.8 °F)  Min: 36.3 °C (97.4 °F)  Max: 36.8 °C (98.2 °F)  Micro: no new positive cultures  Recent Labs      03/15/17   0510  17   0454   WBC  9.3  14.0*   NEUTSPOLYS  89.20*  91.70*   LYMPHOCYTES  6.60*  5.10*   MONOCYTES  1.40  1.50   EOSINOPHILS  0.00  0.00   BASOPHILS  0.10  0.10     Current  Facility-Administered Medications   Medication Dose Frequency Provider Last Rate Last Dose   • methylPREDNISolone (SOLU-MEDROL) 40 MG injection 40 mg  40 mg Q8HRS Terence Macias M.D.   40 mg at 03/16/17 0600   • ipratropium-albuterol (DUONEB) nebulizer solution 3 mL  3 mL Q4H PRN (RT) Terence Macias M.D.   3 mL at 03/15/17 1019   • cyclobenzaprine (FLEXERIL) tablet 10 mg  10 mg TID PRN Herman Johns M.D.   10 mg at 03/16/17 0557   • acetaminophen (TYLENOL) tablet 650 mg  650 mg Q4HRS PRN Herman Johns M.D.   650 mg at 03/15/17 1703   • trazodone (DESYREL) tablet 300 mg  300 mg QHS PRN Jeremy M Gonda, M.D.   300 mg at 03/15/17 2042   • ferrous sulfate tablet 325 mg  325 mg BID WITH MEALS Angel Menjivar M.D.   325 mg at 03/15/17 0909   • morphine (pf) 4 mg/ml injection 1-4 mg  1-4 mg Q4HRS PRN Angel Menjivar M.D.   4 mg at 03/12/17 2235   • tiotropium (SPIRIVA) 18 MCG inhalation capsule 1 Cap  1 Cap QDAILY (RT) Eugene Castro M.D.   1 Cap at 03/16/17 0726   • nystatin (MYCOSTATIN) powder   TID Angel Menjivar M.D.   1,500,000 Units at 03/16/17 0746   • gabapentin (NEURONTIN) capsule 600 mg  600 mg Q8HRS Angel Menjivar M.D.   600 mg at 03/16/17 0556   • oxycodone immediate-release (ROXICODONE) tablet 10 mg  10 mg Q6HRS PRN Angel Menjivar M.D.   10 mg at 03/16/17 0555   • senna-docusate (PERICOLACE or SENOKOT S) 8.6-50 MG per tablet 2 Tab  2 Tab BID Eugene Castro M.D.   Stopped at 03/15/17 0900    And   • polyethylene glycol/lytes (MIRALAX) PACKET 1 Packet  1 Packet QDAY PRN Eugene Castro M.D.        And   • magnesium hydroxide (MILK OF MAGNESIA) suspension 30 mL  30 mL QDAY PRN Eugene Castro M.D.        And   • bisacodyl (DULCOLAX) suppository 10 mg  10 mg QDAY PRN Eugene Castro M.D.       • Respiratory Care per Protocol   Continuous RT Eugene Castro M.D.       • NS (BOLUS) infusion 500 mL  500 mL Once PRN Eugene Castro M.D.       • heparin injection 5,000 Units  5,000 Units Q8HRS Eugene Castro M.D.    5,000 Units at 03/14/17 1352   • insulin lispro (HUMALOG) injection 2-9 Units  2-9 Units 4X/DAY ACHLUIS Castro M.D.   5 Units at 03/16/17 1104   • glucose 4 g chewable tablet 16 g  16 g Q15 MIN PRN Eugene Castro M.D.        And   • dextrose 50% (D50W) injection 25 mL  25 mL Q15 MIN PRN Eugene Castro M.D.       • ondansetron (ZOFRAN) syringe/vial injection 4 mg  4 mg Q4HRS PRN Eugene Castro M.D.       • ondansetron (ZOFRAN ODT) dispertab 4 mg  4 mg Q4HRS PRN Eugene Castro M.D.       • nicotine (NICODERM) 21 MG/24HR 21 mg  21 mg Daily-0600 Eugene Castro M.D.   21 mg at 03/16/17 0603    And   • nicotine polacrilex (NICORETTE) 2 MG piece 2 mg  2 mg Q HOUR PRN Eugene Castro M.D.       • duloxetine (CYMBALTA) capsule 60 mg  60 mg DAILY Eugene Castro M.D.   60 mg at 03/16/17 0746   • escitalopram (LEXAPRO) tablet 10 mg  10 mg DAILY Eugene Castro M.D.   10 mg at 03/16/17 0746   • insulin glargine (LANTUS) injection 20 Units  20 Units Nightly Eugene Castro M.D.   20 Units at 03/15/17 2051   • levothyroxine (SYNTHROID) tablet 75 mcg  75 mcg AM ES Eugene Castro M.D.   75 mcg at 03/16/17 0555   • simvastatin (ZOCOR) tablet 10 mg  10 mg Q EVENING Eugene Castro M.D.   10 mg at 03/15/17 2041   • guaifenesin LA (MUCINEX) tablet 600 mg  600 mg Q12HRS Eugene Castro M.D.   600 mg at 03/16/17 0747   • budesonide-formoterol (SYMBICORT) 160-4.5 MCG/ACT inhaler 2 Puff  2 Puff BID Sukumar Rouse, PHARMD   2 Puff at 03/16/17 0726     Last reviewed on 3/7/2017  6:53 PM by Angeles Odom PhT    Quality  Measures:  Core Measures & Quality Metrics    Problems:  Acute on chronic hypoxic respiratory failure  - 2/2 pneumonia + pulmonary edema  - supplemental o2  - mobilize, IS  - desaturation screen prior to discharge    CAP- s/p completion of unasyn  - resolved    COPD- currently with good air movement and no wheezing. Ambulation mostly limited by LBP  - severity unknown  - 55+PY history of smoking, quit 2/28/17  - chronic productive cough, at baseline  -  titrated down methylpred to 40mg BID, can transition to 60mg po prednisone in AM  - symbicort, spiriva, BRADY  - spiriva will be new prescription  - COPD education, vaccination  - Needs outpt pulm follow up-- PFTs, PSG, 6MWT, pulmonary rehab  - desaturation prior to discharge  PT OT    PHTN- 2013 estimated moderate in severity, grade 2 DD  - s/p forced diuresis  - check daily weights, BNP in AM, lasix 1 dose now  - follow renal indices- Cr near baseline  - repeat echo pending    Henri Pandey MD  PCCM

## 2017-03-16 NOTE — PROGRESS NOTES
Hospital Medicine Progress Note, Adult, Complex               Author: Terence Macias Date & Time created: 3/16/2017  11:00 AM     Interval History:    63YR OLD F WITH  ACUTE ON CHRONIC COPD EXACERBATION AND PNEUMONIA INDUCED SEPSIS    Review of Systems:  Review of Systems   Constitutional: Negative for fever, chills and weight loss.   HENT: Negative for hearing loss and tinnitus.    Eyes: Negative for blurred vision, double vision and photophobia.   Respiratory: Positive for shortness of breath.    Cardiovascular: Negative for chest pain and claudication.   Gastrointestinal: Negative for heartburn, nausea and vomiting.   Genitourinary: Negative for dysuria, urgency and frequency.   Musculoskeletal: Negative for myalgias, back pain and neck pain.   Skin: Negative for itching and rash.   Neurological: Negative for dizziness, tingling, tremors and headaches.       Physical Exam:  Physical Exam   Constitutional: She is oriented to person, place, and time. No distress.   HENT:   Head: Normocephalic and atraumatic.   Eyes: Right eye exhibits no discharge. Left eye exhibits no discharge.   Neck: No JVD present.   Cardiovascular: Normal rate and regular rhythm.    Pulmonary/Chest: No stridor. She is in respiratory distress (STILL SHORT OF BREATH). She exhibits no tenderness.   Abdominal: She exhibits no distension. There is no tenderness. There is no rebound.   Musculoskeletal: She exhibits no tenderness.   Neurological: She is alert and oriented to person, place, and time.   Skin: Skin is warm and dry. She is not diaphoretic.       Labs:        Invalid input(s): [id number]      Recent Labs      03/14/17   0456  03/15/17   0510  03/16/17   0454   SODIUM  141  137  138   POTASSIUM  4.0  4.4  4.4   CHLORIDE  109  106  105   CO2  20  21  26   BUN  18  22  32*   CREATININE  1.10  1.04  1.22   CALCIUM  8.5  8.8  8.8     Recent Labs      03/14/17   0456  03/15/17   0510  03/16/17   0454   GLUCOSE  87  170*  153*      Recent Labs      03/15/17   0510  17   0454   RBC  3.29*  3.12*   HEMOGLOBIN  8.0*  7.8*   HEMATOCRIT  28.2*  26.5*   PLATELETCT  247  283     Recent Labs      03/15/17   0510  17   0454   WBC  9.3  14.0*   NEUTSPOLYS  89.20*  91.70*   LYMPHOCYTES  6.60*  5.10*   MONOCYTES  1.40  1.50   EOSINOPHILS  0.00  0.00   BASOPHILS  0.10  0.10           Hemodynamics:  Temp (24hrs), Av.6 °C (97.8 °F), Min:36.3 °C (97.4 °F), Max:36.8 °C (98.2 °F)  Temperature: 36.3 °C (97.4 °F)  Pulse  Av.1  Min: 58  Max: 97   Blood Pressure : 154/74 mmHg    Respiratory:    Respiration: 18, Pulse Oximetry: 94 %, O2 Daily Delivery Respiratory : Silicone Nasal Cannula     PEP/CPT Method: Positive Airway Pressure Device, Work Of Breathing / Effort: Mild  RUL Breath Sounds: Diminished;Clear, RML Breath Sounds: Diminished, RLL Breath Sounds: Expiratory Wheezes, BIRD Breath Sounds: Expiratory Wheezes, LLL Breath Sounds: Expiratory Wheezes  Fluids:    Intake/Output Summary (Last 24 hours) at 17 1100  Last data filed at 17 0600   Gross per 24 hour   Intake    920 ml   Output      0 ml   Net    920 ml        GI/Nutrition:  Orders Placed This Encounter   Procedures   • Diet Order     Standing Status: Standing      Number of Occurrences: 1      Standing Expiration Date:      Order Specific Question:  Diet:     Answer:  Diabetic [3]     Medical Decision Making, by Problem:  Active Hospital Problems    Diagnosis   • DM type 2, uncontrolled, with renal complications (CMS-HCC) [E11.29, E11.65]   • Morbid obesity (CMS-HCC) [E66.01]   • Hyponatremia [E87.1]   • Hyperkalemia [E87.5]   • Acute on chronic renal failure (CMS-HCC) [N17.9, N18.9]   • Acute on chronic respiratory failure (CMS-HCC) [J96.20]   • Severe sepsis with septic shock (CMS-HCC) [A41.9, R65.21]   • Anemia [D64.9]   • CAP (community acquired pneumonia) [J18.9]   • Septic shock (CMS-HCC) [A41.9, R65.21]   • Gastroesophageal reflux disease with esophagitis  [K21.0]   • Chronic constipation [K59.09]   • Chronic respiratory failure (CMS-HCC) [J96.10]   • EDITH (obstructive sleep apnea) [G47.33]   • Chronic pain syndrome [G89.4]   • Vitamin D deficiency [E55.9]   • Hypothyroidism [E03.9]   • COPD (chronic obstructive pulmonary disease) (CMS-HCC) [J44.9]   • HTN (hypertension) [I10]   .  66YR OLD F WITH ACUTE ON CHRONIC COPD EXACERBATION/ACUTE RESP FAILURE  PRESENT AT THE TIME OF ADMISSION  STILL IS SHORT OF BREATH   SOLUMEDROL  SYMBICORT  DUONEB  PULMONOLOGY INPUT IS NOTED  CALLED PULMONARY FOR RE-CONSULT    ?OF CHF  CXRAY RESULT IS NOTED   CARDIAC ECHO RESULT IS PENDING      PNEUMONIA INDUCED SEPSIS  DOING BETTER  UNASYN FINISHED    HYPONATREMIA    RESOLVED    DM  LANTUS  S.S.INSULIN      Mckeon catheter: No Mckeon

## 2017-03-16 NOTE — PROGRESS NOTES
"Patient A&Ox4 this shift. Patient denies pain or the need for interventions. Patient sating 94% on 4 lpm of oxygen via nc. Patient wears 3.5 at baseline. Denies sob or difficulty breathing. Patient has generalized bruising and BLE edema. Patient refusing bed alarm and non slip socks after further education from this RN. Patient using personal slippers with . Safety measures intact. Call bell in reach. Hourly rounding completed. No s/sx of distress this shift.       1435- PRN Oxy administered fro 6/10 chronic back pain; see eMAR  1614- Pain reassessment. Patient found sleeping in bed comfortably  1715- Patient has elevated BP. Patient asymptomatic. No PRN's avaiable. Patient states, \"I think I'm just excited to go home!\" Jade hospitalist RN made aware. RN to contact Dr. Macias  9411- New order obtained for PRN Hydralizine   1800- PRN Hydralazine administered for bp 180/70  1810- Repeat /50.  "

## 2017-03-16 NOTE — PROGRESS NOTES
Patient is refusing a bed alarm and the treaded socks. Patient prefers her own slippers.  Educated regarding the use of an alarm and treaded socks  for her safety from falls and she is still refusing. All other safety precautions are in place.

## 2017-03-16 NOTE — CARE PLAN
Problem: Communication  Goal: The ability to communicate needs accurately and effectively will improve  Outcome: PROGRESSING AS EXPECTED  Patient is able to make needs known    Problem: Safety  Goal: Will remain free from injury  Outcome: PROGRESSING AS EXPECTED  Patient did not have a fall this shift. Patient refusing bed alarm and non-slip socks after further education from this RN. Patient standby assist

## 2017-03-16 NOTE — THERAPY
"Occupational Therapy Treatment completed with focus on ADLs, ADL transfers and patient education.  Functional Status:  Pt seen today for OT tx. Pt is supv for basic self cares and functional mobility with FWW. Supv for toileting/toilet txf/grooming in stance at sink. Pt did desat when ambulating to low 80s. Seated rest break and patient recovered. Pt needed two seated rest breaks total. Pt limited by fatigue and O2 needs, but making good progress.  Plan of Care: Will benefit from Occupational Therapy 3 times per week  Discharge Recommendations:  Equipment No Equipment Needed. Post-acute therapy recommended after discharged home with  services.    See \"Rehab Therapy-Acute\" Patient Summary Report for complete documentation.   "

## 2017-03-17 ENCOUNTER — PATIENT OUTREACH (OUTPATIENT)
Dept: HEALTH INFORMATION MANAGEMENT | Facility: OTHER | Age: 67
End: 2017-03-17

## 2017-03-17 VITALS
TEMPERATURE: 96.9 F | DIASTOLIC BLOOD PRESSURE: 65 MMHG | OXYGEN SATURATION: 95 % | WEIGHT: 243.83 LBS | HEART RATE: 65 BPM | RESPIRATION RATE: 16 BRPM | SYSTOLIC BLOOD PRESSURE: 139 MMHG | HEIGHT: 65 IN | BODY MASS INDEX: 40.62 KG/M2

## 2017-03-17 LAB
ANION GAP SERPL CALC-SCNC: 6 MMOL/L (ref 0–11.9)
BASOPHILS # BLD AUTO: 0.2 % (ref 0–1.8)
BASOPHILS # BLD: 0.02 K/UL (ref 0–0.12)
BUN SERPL-MCNC: 40 MG/DL (ref 8–22)
CALCIUM SERPL-MCNC: 8.9 MG/DL (ref 8.5–10.5)
CHLORIDE SERPL-SCNC: 104 MMOL/L (ref 96–112)
CO2 SERPL-SCNC: 27 MMOL/L (ref 20–33)
CREAT SERPL-MCNC: 1.22 MG/DL (ref 0.5–1.4)
EOSINOPHIL # BLD AUTO: 0 K/UL (ref 0–0.51)
EOSINOPHIL NFR BLD: 0 % (ref 0–6.9)
ERYTHROCYTE [DISTWIDTH] IN BLOOD BY AUTOMATED COUNT: 58.9 FL (ref 35.9–50)
GFR SERPL CREATININE-BSD FRML MDRD: 44 ML/MIN/1.73 M 2
GLUCOSE BLD-MCNC: 136 MG/DL (ref 65–99)
GLUCOSE BLD-MCNC: 201 MG/DL (ref 65–99)
GLUCOSE SERPL-MCNC: 139 MG/DL (ref 65–99)
HCT VFR BLD AUTO: 27.8 % (ref 37–47)
HGB BLD-MCNC: 8.2 G/DL (ref 12–16)
IMM GRANULOCYTES # BLD AUTO: 0.21 K/UL (ref 0–0.11)
IMM GRANULOCYTES NFR BLD AUTO: 1.6 % (ref 0–0.9)
LYMPHOCYTES # BLD AUTO: 0.65 K/UL (ref 1–4.8)
LYMPHOCYTES NFR BLD: 5 % (ref 22–41)
MCH RBC QN AUTO: 25.2 PG (ref 27–33)
MCHC RBC AUTO-ENTMCNC: 29.5 G/DL (ref 33.6–35)
MCV RBC AUTO: 85.3 FL (ref 81.4–97.8)
MONOCYTES # BLD AUTO: 0.2 K/UL (ref 0–0.85)
MONOCYTES NFR BLD AUTO: 1.6 % (ref 0–13.4)
NEUTROPHILS # BLD AUTO: 11.81 K/UL (ref 2–7.15)
NEUTROPHILS NFR BLD: 91.6 % (ref 44–72)
NRBC # BLD AUTO: 0 K/UL
NRBC BLD AUTO-RTO: 0 /100 WBC
PLATELET # BLD AUTO: 335 K/UL (ref 164–446)
PMV BLD AUTO: 9.2 FL (ref 9–12.9)
POTASSIUM SERPL-SCNC: 4.3 MMOL/L (ref 3.6–5.5)
RBC # BLD AUTO: 3.26 M/UL (ref 4.2–5.4)
SODIUM SERPL-SCNC: 137 MMOL/L (ref 135–145)
WBC # BLD AUTO: 12.9 K/UL (ref 4.8–10.8)

## 2017-03-17 PROCEDURE — 80048 BASIC METABOLIC PNL TOTAL CA: CPT

## 2017-03-17 PROCEDURE — 94668 MNPJ CHEST WALL SBSQ: CPT

## 2017-03-17 PROCEDURE — 36415 COLL VENOUS BLD VENIPUNCTURE: CPT

## 2017-03-17 PROCEDURE — 99239 HOSP IP/OBS DSCHRG MGMT >30: CPT | Performed by: FAMILY MEDICINE

## 2017-03-17 PROCEDURE — 700111 HCHG RX REV CODE 636 W/ 250 OVERRIDE (IP): Performed by: INTERNAL MEDICINE

## 2017-03-17 PROCEDURE — 700102 HCHG RX REV CODE 250 W/ 637 OVERRIDE(OP): Performed by: HOSPITALIST

## 2017-03-17 PROCEDURE — 94669 MECHANICAL CHEST WALL OSCILL: CPT

## 2017-03-17 PROCEDURE — A9270 NON-COVERED ITEM OR SERVICE: HCPCS | Performed by: FAMILY MEDICINE

## 2017-03-17 PROCEDURE — 82962 GLUCOSE BLOOD TEST: CPT | Mod: 91

## 2017-03-17 PROCEDURE — 85025 COMPLETE CBC W/AUTO DIFF WBC: CPT

## 2017-03-17 PROCEDURE — 700102 HCHG RX REV CODE 250 W/ 637 OVERRIDE(OP): Performed by: INTERNAL MEDICINE

## 2017-03-17 PROCEDURE — A9270 NON-COVERED ITEM OR SERVICE: HCPCS | Performed by: INTERNAL MEDICINE

## 2017-03-17 PROCEDURE — A9270 NON-COVERED ITEM OR SERVICE: HCPCS | Performed by: HOSPITALIST

## 2017-03-17 PROCEDURE — 700102 HCHG RX REV CODE 250 W/ 637 OVERRIDE(OP): Performed by: FAMILY MEDICINE

## 2017-03-17 RX ORDER — LISINOPRIL 5 MG/1
5 TABLET ORAL
Status: DISCONTINUED | OUTPATIENT
Start: 2017-03-17 | End: 2017-03-17

## 2017-03-17 RX ORDER — TIOTROPIUM BROMIDE 18 UG/1
18 CAPSULE ORAL; RESPIRATORY (INHALATION) DAILY
Qty: 30 CAP | Refills: 0 | Status: ON HOLD | OUTPATIENT
Start: 2017-03-17 | End: 2017-04-25

## 2017-03-17 RX ORDER — LISINOPRIL 20 MG/1
40 TABLET ORAL
Status: DISCONTINUED | OUTPATIENT
Start: 2017-03-17 | End: 2017-03-17 | Stop reason: HOSPADM

## 2017-03-17 RX ORDER — PREDNISONE 10 MG/1
TABLET ORAL
Qty: 42 TAB | Refills: 0 | Status: ON HOLD | OUTPATIENT
Start: 2017-03-17 | End: 2017-04-09

## 2017-03-17 RX ADMIN — INSULIN LISPRO 3 UNITS: 100 INJECTION, SOLUTION INTRAVENOUS; SUBCUTANEOUS at 10:49

## 2017-03-17 RX ADMIN — LEVOTHYROXINE SODIUM 75 MCG: 75 TABLET ORAL at 06:19

## 2017-03-17 RX ADMIN — BUDESONIDE AND FORMOTEROL FUMARATE DIHYDRATE 2 PUFF: 160; 4.5 AEROSOL RESPIRATORY (INHALATION) at 08:33

## 2017-03-17 RX ADMIN — Medication 325 MG: at 08:32

## 2017-03-17 RX ADMIN — OXYCODONE HYDROCHLORIDE 10 MG: 5 TABLET ORAL at 04:09

## 2017-03-17 RX ADMIN — DULOXETINE HYDROCHLORIDE 60 MG: 60 CAPSULE, DELAYED RELEASE ORAL at 08:32

## 2017-03-17 RX ADMIN — METHYLPREDNISOLONE SODIUM SUCCINATE 40 MG: 40 INJECTION, POWDER, FOR SOLUTION INTRAMUSCULAR; INTRAVENOUS at 08:31

## 2017-03-17 RX ADMIN — LISINOPRIL 5 MG: 5 TABLET ORAL at 08:39

## 2017-03-17 RX ADMIN — NICOTINE 21 MG: 21 PATCH TRANSDERMAL at 06:18

## 2017-03-17 RX ADMIN — NYSTATIN 1500000 UNITS: 100000 POWDER TOPICAL at 08:34

## 2017-03-17 RX ADMIN — ESCITALOPRAM OXALATE 10 MG: 10 TABLET ORAL at 08:32

## 2017-03-17 RX ADMIN — GUAIFENESIN 600 MG: 600 TABLET, EXTENDED RELEASE ORAL at 08:32

## 2017-03-17 RX ADMIN — TIOTROPIUM BROMIDE 1 CAPSULE: 18 CAPSULE ORAL; RESPIRATORY (INHALATION) at 09:11

## 2017-03-17 RX ADMIN — GABAPENTIN 600 MG: 300 CAPSULE ORAL at 06:18

## 2017-03-17 ASSESSMENT — PAIN SCALES - GENERAL
PAINLEVEL_OUTOF10: 0
PAINLEVEL_OUTOF10: 0

## 2017-03-17 NOTE — DISCHARGE PLANNING
Medical Social Work  Patient does not want to go to a skilled facility, would prefer going home and trying [facility], [facility] was accepted.  [facility] faxed to [facility].

## 2017-03-17 NOTE — DISCHARGE SUMMARY
DATE OF ADMISSION:  03/07/2017    DATE OF DISCHARGE:  03/17/2017    HISTORY OF PRESENT ILLNESS:  This is a 66-year-old female that had come to the   emergency room with a complaint of cough and weakness.  Patient had stated   that she was having worsening of cough for the past 3 days prior to coming to   the emergency room with progressive weakness.  The patient was diagnosed with   pneumonia and pneumonia-induced sepsis present at the time of presentation to   the emergency room and also was noted to have acute-on-chronic COPD   exacerbation/acute respiratory failure.  The patient also was found to have   hyponatremia.  Patient was admitted to hospital for further management.  For   pneumonia-induced sepsis, patient was given IV fluid, also was placed on   Unasyn and Zithromax.  For the COPD exacerbation, patient was continued on the   Symbicort and also was placed on IV steroids.  For hypokalemia, patient also   was given IV fluid normal saline.  Patient also was found to have   hyperkalemia, was given calcium gluconate, also was given Kayexalate and also   was given insulin as well with D50.  Patient's chest x-ray on 03/07/2017,   patient was found to have patchy opacities again seen throughout the right   lung.  The patient also was seen in consultation by the pulmonary service.    Dr. Damian has seen the patient and patient initially was admitted to the   ICU.  Blood cultures were also collected.  Blood cultures have been negative   x2.  Urine culture came back positive, but it was a contamination with mixed   bacteria and mixed skin rojelio.  The patient started to improve and eventually   was downgraded to the medical floor.  Patient also had the physical therapy   seeing the patient.  Recommendation was post-acute therapy recommended after   discharge home with _____ discharge services.  I recommended that patient   should go to a rehab.  She stated that she wants to go home.  She also wears   oxygen at  home at night, but none for now.  She needs to wear them   continuously.  Patient also was seen by the pulmonary service, Dr. Pandey   yesterday.  Recommendation was to start titrating down the steroids and a   couple doses of Lasix was given to the patient.  Also, it was recommended to   add Spiriva to the patient's regimen.  Patient also had a cardiac 2D echo that   was done.  The cardiac 2D conclusion, left ventricular ejection fraction is   60%, estimated right ventricular systolic pressure is 60 mmHg.  No significant   valvular heart disease.  Pulmonary hypertension is noted.  Grade II diastolic   dysfunction noted.    PHYSICAL EXAMINATION:  VITAL SIGNS:  Temperature of 36.1, pulse of 60, respiratory rate of 16, blood   pressure 183/68, O2 saturation 96% on 3.5 L of oxygen.  GENERAL APPEARANCE:  The patient is awake, alert, oriented x3, very pleasant,   in no acute distress.  NEUROLOGIC:  Cranial nerves II-XII intact.  HEAD AND NECK:  Supple.  Oral cavity is moist.  No jugular venous distention   noted.  No icterus in both eyes.  CARDIOVASCULAR:  S1, S2, regular.  LUNGS:  Decreased breath sounds, otherwise, clear.  ABDOMEN:  Soft, nontender.  EXTREMITIES:  Lower extremities, trace edema noted in tibia bilaterally.    LABORATORY DATA:  WBC of 12.9; H and H of 8.2 and 27.8; platelets of 335,000.    Sodium is 137, potassium 4.3, chloride 104, bicarbonate 27, BUN of 40,   creatinine of 1.22.    ASSESSMENT:  1.  This is a 66-year-old female with pneumonia-induced sepsis, has resolved.  2.  Status post antibiotic therapy.  3.  Acute-on-chronic obstructive pulmonary disease exacerbation/acute   respiratory failure.    PLAN:  1.  We will place the patient on prednisone 60 mg p.o. daily for first 2 days,   50 mg p.o. daily for the second 2 days, 40 mg p.o. daily for the third 2   days, 30 mg p.o. daily for the fourth 2 days, 20 mg p.o. daily for the fifth 2   days and 10 mg p.o. daily for the sixth 2 days for a total of  12 days.  2.  The patient is to _____ continue Spiriva one inhalation daily.  3.  Also, continue with the Advair 500/50 one puff b.i.d.  4.  Albuterol 2 puffs every 4-6 hours p.r.n. as needed.  5.  For hypertension, we will restart her lisinopril 40 mg p.o. daily.  Also,   one dose of the lisinopril will be given now, 40 mg p.o. x1 now and then   continue as usual.  6.  For diabetes, continue with Lantus.  Accu-Cheks a.c. and at bedtime.    FOLLOWUP:  Please follow up with the primary care physician in 1 week and also   follow up with the pulmonary service for further management in 1-2 weeks.       ____________________________________     MD SHARIFA Jean Baptiste / DMITRY    DD:  03/17/2017 08:46:41  DT:  03/17/2017 09:21:32    D#:  [id number]  Job#:  [id number]

## 2017-03-17 NOTE — DISCHARGE INSTRUCTIONS
Discharge Instructions    Discharged to home by car with friend. Discharged via wheelchair, hospital escort: Yes.  Special equipment needed: Oxygen    Be sure to schedule a follow-up appointment with your primary care doctor or any specialists as instructed.     Follow up with PULMONARY IN 2 WEEKS   Follow up with  Primary Care Provider IN 1 WEEK.   BMP ON 3/21/2017    Discharge Plan: Home with Home Health  Diet Plan: Discussed  Activity Level: Discussed  Smoking Cessation Offered: Patient Refused  Confirmed Follow up Appointment: Patient to Call and Schedule Appointment  Confirmed Symptoms Management: Discussed  Medication Reconciliation Updated: Yes  Influenza Vaccine Indication: Patient Refuses    I understand that a diet low in cholesterol, fat, and sodium is recommended for good health. Unless I have been given specific instructions below for another diet, I accept this instruction as my diet prescription.   Other diet: Diabetic    Special Instructions: None    · Is patient discharged on Warfarin / Coumadin?   No     · Is patient Post Blood Transfusion?  No    Chronic Obstructive Pulmonary Disease  Chronic obstructive pulmonary disease (COPD) is a common lung problem. In COPD, the flow of air from the lungs is limited. The way your lungs work will probably never return to normal, but there are things you can do to improve your lungs and make yourself feel better. Your doctor may treat your condition with:  · Medicines.  · Oxygen.  · Lung surgery.  · Changes to your diet.  · Rehabilitation. This may involve a team of specialists.  HOME CARE  · Take all medicines as told by your doctor.  · Avoid medicines or cough syrups that dry up your airway (such as antihistamines) and do not allow you to get rid of thick spit. You do not need to avoid them if told differently by your doctor.  · If you smoke, stop. Smoking makes the problem worse.  · Avoid being around things that make your breathing worse (like smoke,  chemicals, and fumes).  · Use oxygen therapy and therapy to help improve your lungs (pulmonary rehabilitation) if told by your doctor. If you need home oxygen therapy, ask your doctor if you should buy a tool to measure your oxygen level (oximeter).  · Avoid people who have a sickness you can catch (contagious).  · Avoid going outside when it is very hot, cold, or humid.  · Eat healthy foods. Eat smaller meals more often. Rest before meals.  · Stay active, but remember to also rest.  · Make sure to get all the shots (vaccines) your doctor recommends. Ask your doctor if you need a pneumonia shot.  · Learn and use tips on how to relax.  · Learn and use tips on how to control your breathing as told by your doctor. Try:  ¨ Breathing in (inhaling) through your nose for 1 second. Then, pucker your lips and breath out (exhale) through your lips for 2 seconds.  ¨ Putting one hand on your belly (abdomen). Breathe in slowly through your nose for 1 second. Your hand on your belly should move out. Pucker your lips and breathe out slowly through your lips. Your hand on your belly should move in as you breathe out.  · Learn and use controlled coughing to clear thick spit from your lungs. The steps are:  1. Lean your head a little forward.  2. Breathe in deeply.  3. Try to hold your breath for 3 seconds.  4. Keep your mouth slightly open while coughing 2 times.  5. Spit any thick spit out into a tissue.  6. Rest and do the steps again 1 or 2 times as needed.  GET HELP IF:  · You cough up more thick spit than usual.  · There is a change in the color or thickness of the spit.  · It is harder to breathe than usual.  · Your breathing is faster than usual.  GET HELP RIGHT AWAY IF:  · You have shortness of breath while resting.  · You have shortness of breath that stops you from:  ¨ Being able to talk.  ¨ Doing normal activities.  · You chest hurts for longer than 5 minutes.  · Your skin color is more blue than usual.  · Your pulse  oximeter shows that you have low oxygen for longer than 5 minutes.  MAKE SURE YOU:  · Understand these instructions.  · Will watch your condition.  · Will get help right away if you are not doing well or get worse.     This information is not intended to replace advice given to you by your health care provider. Make sure you discuss any questions you have with your health care provider.     Document Released: 06/05/2009 Document Revised: 01/08/2016 Document Reviewed: 08/14/2014  [employer] Interactive Patient Education ©2016 [employer]      Depression / Suicide Risk    As you are discharged from this [facility] facility, it is important to learn how to keep safe from harming yourself.    Recognize the warning signs:  · Abrupt changes in personality, positive or negative- including increase in energy   · Giving away possessions  · Change in eating patterns- significant weight changes-  positive or negative  · Change in sleeping patterns- unable to sleep or sleeping all the time   · Unwillingness or inability to communicate  · Depression  · Unusual sadness, discouragement and loneliness  · Talk of wanting to die  · Neglect of personal appearance   · Rebelliousness- reckless behavior  · Withdrawal from people/activities they love  · Confusion- inability to concentrate     If you or a loved one observes any of these behaviors or has concerns about self-harm, here's what you can do:  · Talk about it- your feelings and reasons for harming yourself  · Remove any means that you might use to hurt yourself (examples: pills, rope, extension cords, firearm)  · Get professional help from the community (Mental Health, Substance Abuse, psychological counseling)  · Do not be alone:Call your Safe Contact- someone whom you trust who will be there for you.  · Call your local CRISIS HOTLINE 426-5509 or 676-361-6384  · Call your local Children's Mobile Crisis Response Team [place] (588) 710-2131 or www.[employer]  · Call  the toll free National Suicide Prevention Hotlines   · National Suicide Prevention Lifeline 686-273-XPNT (6781)  · National Hope Line Network 800-SUICIDE (013-8598)

## 2017-03-17 NOTE — PROGRESS NOTES
LIMB PRESERVATION SERVICE     66-year-old female with a past medical history of COPD, spinal stenosis, fibromyalgia,    diabetes mellitus, hypertension, obstructive sleep apnea, admitted for cough and weakness    LPS consult requested by wound team for callus/wound to distal right 2nd toe    Callus, 0.8cm2,  debrided tos skin level using a scalpel.  No underlying wound identified    Patient has strong pedal pulses, no other toe wounds    A1c 7.9%.     Advised patient to request Rx to see orthotist for shoes and inserts    No further LPS needs, will sign off

## 2017-03-17 NOTE — FACE TO FACE
Face to Face Supporting Documentation - Home Health    The encounter with this patient was in whole or in part the primary reason for home health admission.    Date of encounter:   Patient:                    MRN:                       YOB: 2018  Priya Callahan  [id number]  1950     Home health to see patient for:  Skilled Nursing care for assessment, interventions & education    Skilled need for:  Exacerbation of Chronic Disease State COPD     Skilled nursing interventions to include:  Comment: COPD     Homebound status evidenced by:  Needs the assistance of another person in order to leave the home. Leaving home requires a considerable and taxing effort. There is a normal inability to leave the home.    Community Physician to provide follow up care: Mickie Lawson M.D.     Optional Interventions? Yes, Details: COPD       I certify the face to face encounter for this home health care referral meets the CMS requirements and the encounter/clinical assessment with the patient was, in whole, or in part, for the medical condition(s) listed above, which is the primary reason for home health care. Based on my clinical findings: the service(s) are medically necessary, support the need for home health care, and the homebound criteria are met.  I certify that this patient has had a face to face encounter by myself.  Terence Macias M.D. - NPI: [id number]

## 2017-03-17 NOTE — DISCHARGE PLANNING
[facility] a HH choice form the per the choice form the referral has been sent to [facility]

## 2017-03-17 NOTE — PROGRESS NOTES
Patient A&Ox4 this shift. Patient denies pain or the need for interventions. Patient sating 95% on 3.5 lpm of oxygen via nc. Patient wears 3.5 at baseline. Denies sob or difficulty breathing. Patient has generalized bruising and BLE edema. Patient refusing bed alarm and non slip socks after further education from this RN. Patient using personal slippers with . Safety measures intact. Call bell in reach. Hourly rounding completed. No s/sx of distress this shift.     0830- MD made aware of elevated BP. New order obtained for Lisinopril 5mg  0839- Lisinopril administered; see eMAR.  0902- Repeat /65. Hold 40mg dose per Dr. Macias  0945- Discharge instructions reviewed and provided to patient. All questions answered at this time. Prescriptions in hand.   1300- IV removed. All belongings returned to patient. Patient received personal medications that were locked up in Pharmacy.   1415- Patient off the unit via wheelchair with transport

## 2017-03-17 NOTE — PROGRESS NOTES
"Pulmonary Progress Note    Patient ID:   Name:             Priya Callahan   YOB: 1950  Age:                 66 y.o.  female   MRN:               [id number]                                                  Subjective: Doing well today, thinks she is at baseline. Plan to go home today.     Interval Changes: No changes overnight.     History of Present Illness: As per initial consultation note: \"66 y.o. female who has been feeling congested for the last few days. She lives in [place].  She uses a wheelchair intermittently to get around.  She has chronic back pain.  Today, she fell at home on 2 occasions because she was weak.  She has a cough with chest congestion and is producing some cream-colored sputum.  She denies hemoptysis.  She denies any chest pain or chest pressure.  She has no nausea, vomiting or diarrhea.  She has had poor oral intake today because she could not get food or water.  She denies dysuria, urgency, frequency or hematuria.  She presented to the emergency room.  She is on vasopressor  support.  She was hypotensive and has received over 4 liters of intravenous  crystalloid solution.\"    Course c/b septic shock, brief vasopressor requirement and transferred out of ICU on 3/11/17.    Objective:   Vitals/ General Appearance:   Weight/BMI: Body mass index is 40.58 kg/(m^2).  Blood pressure 139/65, pulse 65, temperature 36.1 °C (96.9 °F), resp. rate 16, height 1.651 m (5' 5\"), weight 110.6 kg (243 lb 13.3 oz), SpO2 95 %, not currently breastfeeding.  Filed Vitals:    03/17/17 0713 03/17/17 0836 03/17/17 0902 03/17/17 0917   BP: 183/68 188/73 139/65    Pulse: 60   65   Temp: 36.1 °C (96.9 °F)      Resp: 16   16   Height:       Weight:       SpO2: 95%   95%     Oxygen Therapy:  Pulse Oximetry: 95 %, O2 (LPM): 3.5, O2 Delivery: Silicone Nasal Cannula    Constitutional:   Well developed, Well nourished, No acute distress  HENMT:  Normocephalic, Atraumatic, Oropharynx moist mucous " membranes, No oral exudates, Nose normal.  No thyromegaly.  Eyes:  EOMI, Conjunctiva normal, No discharge.  Neck:  Normal range of motion, No cervical tenderness,  no JVD.  Cardiovascular:  Normal heart rate, Normal rhythm, systolic  murmurs, No rubs, No gallops.   Extremitites with intact distal pulses, no cyanosis, or edema.  Lungs:  Normal breath sounds, breath sounds clear to auscultation bilaterally,  no crackles, no wheezing.   Abdomen: Bowel sounds normal, Soft, No tenderness, No guarding, No rebound, No masses, No hepatosplenomegaly.  Skin: Warm, Dry, No erythema, No rash, no induration.  Neurologic: Alert & oriented x 3, No focal deficits noted, cranial nerves II through X are grossly intact.  Psychiatric: Affect normal, Judgment normal, Mood normal.    Labs:  Recent Labs      03/15/17   0510  03/16/17   0454  03/17/17   0508   WBC  9.3  14.0*  12.9*   RBC  3.29*  3.12*  3.26*   HEMOGLOBIN  8.0*  7.8*  8.2*   HEMATOCRIT  28.2*  26.5*  27.8*   MCV  85.7  84.9  85.3   MCH  24.3*  25.0*  25.2*   MCHC  28.4*  29.4*  29.5*   RDW  58.4*  58.0*  58.9*   PLATELETCT  247  283  335   MPV  9.8  9.5  9.2                 Recent Labs      03/15/17   0510  03/16/17   0454  03/17/17   0508   SODIUM  137  138  137   POTASSIUM  4.4  4.4  4.3   CHLORIDE  106  105  104   CO2  21  26  27   GLUCOSE  170*  153*  139*   BUN  22  32*  40*     Recent Labs      03/15/17   0510  03/16/17   0454  03/17/17   0508   SODIUM  137  138  137   POTASSIUM  4.4  4.4  4.3   CHLORIDE  106  105  104   CO2  21  26  27   BUN  22  32*  40*   CREATININE  1.04  1.22  1.22   CALCIUM  8.8  8.8  8.9     Results for orders placed or performed during the hospital encounter of 03/07/17   ECHOCARDIOGRAM COMP W/O CONT   Result Value Ref Range    Eject.Frac. MOD BP 61.44     Eject.Frac. MOD 4C 63.05     Eject.Frac. MOD 2C 60.35     Left Ventrical Ejection Fraction 60          Imaging:   ECHOCARDIOGRAM COMP W/O CONT   Final Result      DX-CHEST-PORTABLE (1 VIEW)    Final Result      1.  Findings consistent with congestive heart failure, similar to prior exam.   2.  Worsening RIGHT basilar infiltrate or atelectasis.   3.  No pneumothorax.   4.  Interval removal of RIGHT internal jugular catheter.      DX-CHEST-PORTABLE (1 VIEW)   Final Result      No significant change from prior exam.      DX-CHEST-PORTABLE (1 VIEW)   Final Result      1.  Mild increased inflation.   2.  Extensive RIGHT lung infiltrate is stable.   3.  Supportive tubing is unchanged.      DX-CHEST-PORTABLE (1 VIEW)   Final Result      1.  Supportive tubing as described above.  No pneumothorax.   2.  No other significant change from prior exam.         DX-CHEST-PORTABLE (1 VIEW)   Final Result         1.  Diffuse patchy airspace opacities throughout the right lung most consistent with pneumonitis, however asymmetric pulmonary edema conceivably could have this appearance.          Hospital Medications:    Current facility-administered medications:   •  lisinopril (PRINIVIL) tablet 40 mg, 40 mg, Oral, Q DAY, Terence Macias M.D., Stopped at 03/17/17 0915  •  methylPREDNISolone (SOLU-MEDROL) 40 MG injection 40 mg, 40 mg, Intravenous, Q12HRS, Henri Pandey M.D., 40 mg at 03/17/17 0831  •  hydrALAZINE (APRESOLINE) injection 10 mg, 10 mg, Intravenous, Q4HRS PRN, Terence Macias M.D., 10 mg at 03/16/17 1800  •  ipratropium-albuterol (DUONEB) nebulizer solution 3 mL, 3 mL, Nebulization, Q4H PRN (RT), Terence Macias M.D., 3 mL at 03/15/17 1019  •  cyclobenzaprine (FLEXERIL) tablet 10 mg, 10 mg, Oral, TID PRN, Herman Johns M.D., 10 mg at 03/16/17 2008  •  acetaminophen (TYLENOL) tablet 650 mg, 650 mg, Oral, Q4HRS PRN, Herman Johns M.D., 650 mg at 03/16/17 2008  •  trazodone (DESYREL) tablet 300 mg, 300 mg, Oral, QHS PRN, Jeremy M Gonda, M.D., 300 mg at 03/15/17 2042  •  ferrous sulfate tablet 325 mg, 325 mg, Oral, BID WITH MEALS, Angel Menjivar M.D., 325 mg at 03/17/17 0832  •  morphine (pf) 4  mg/ml injection 1-4 mg, 1-4 mg, Intravenous, Q4HRS PRN, Angel Menjivar M.D., 4 mg at 03/12/17 2235  •  tiotropium (SPIRIVA) 18 MCG inhalation capsule 1 Cap, 1 Cap, Inhalation, QDAILY (RT), Eugene Castro M.D., 1 Cap at 03/17/17 0911  •  nystatin (MYCOSTATIN) powder, , Topical, TID, Angel Menjivar M.D., 1,500,000 Units at 03/17/17 0834  •  gabapentin (NEURONTIN) capsule 600 mg, 600 mg, Oral, Q8HRS, Angel Menjivar M.D., 600 mg at 03/17/17 0618  •  oxycodone immediate-release (ROXICODONE) tablet 10 mg, 10 mg, Oral, Q6HRS PRN, Angel Menjivar M.D., 10 mg at 03/17/17 0409  •  senna-docusate (PERICOLACE or SENOKOT S) 8.6-50 MG per tablet 2 Tab, 2 Tab, Oral, BID, 2 Tab at 03/16/17 2050 **AND** polyethylene glycol/lytes (MIRALAX) PACKET 1 Packet, 1 Packet, Oral, QDAY PRN **AND** magnesium hydroxide (MILK OF MAGNESIA) suspension 30 mL, 30 mL, Oral, QDAY PRN **AND** bisacodyl (DULCOLAX) suppository 10 mg, 10 mg, Rectal, QDAY PRN, Eugene Castro M.D.  •  Respiratory Care per Protocol, , Nebulization, Continuous RT, Eugene Castro M.D.  •  NS (BOLUS) infusion 500 mL, 500 mL, Intravenous, Once PRN, Eugene Castro M.D.  •  heparin injection 5,000 Units, 5,000 Units, Subcutaneous, Q8HRS, Eugene Castro M.D., 5,000 Units at 03/14/17 1352  •  insulin lispro (HUMALOG) injection 2-9 Units, 2-9 Units, Subcutaneous, 4X/DAY ACHS, Eugene Castro M.D., 3 Units at 03/17/17 1049  •  Action is required: Protocol 1073 Hypoglycemia has been implemented, , , Once **AND** Protocol 1073 Inclusion Criteria, , , CONTINUOUS **AND** Protocol 1073 NOTIFY, , , Once **AND** Protocol 1073 Initiate protocol immediately if FSBG is less than or equal to 70 mg/dL, , , CONTINUOUS **AND** glucose 4 g chewable tablet 16 g, 16 g, Oral, Q15 MIN PRN **AND** dextrose 50% (D50W) injection 25 mL, 25 mL, Intravenous, Q15 MIN PRN, Eugene Castro M.D.  •  ondansetron (ZOFRAN) syringe/vial injection 4 mg, 4 mg, Intravenous, Q4HRS PRN, Eugene Castro M.D.  •  ondansetron (ZOFRAN  ODT) dispertab 4 mg, 4 mg, Oral, Q4HRS PRN, Eugene Castro M.D.  •  INITIATE NICOTINE REPLACEMENT PROTOCOL , , , Once **AND** nicotine (NICODERM) 21 MG/24HR 21 mg, 21 mg, Transdermal, Daily-0600, 21 mg at 03/17/17 0618 **AND** Protocol 205 PATIENT EDUCATION MATERIALS, , , Once **AND** Protocol 205 Rotate nicotine patch application sites daily , , , CONTINUOUS **AND** nicotine polacrilex (NICORETTE) 2 MG piece 2 mg, 2 mg, Oral, Q HOUR PRN, Eugene Castro M.D.  •  duloxetine (CYMBALTA) capsule 60 mg, 60 mg, Oral, DAILY, Eugene Castro M.D., 60 mg at 03/17/17 0832  •  escitalopram (LEXAPRO) tablet 10 mg, 10 mg, Oral, DAILY, Eugene Castro M.D., 10 mg at 03/17/17 0832  •  insulin glargine (LANTUS) injection 20 Units, 20 Units, Subcutaneous, Nightly, Eugene Castro M.D., 20 Units at 03/16/17 2044  •  levothyroxine (SYNTHROID) tablet 75 mcg, 75 mcg, Oral, AM ES, Eugene Castro M.D., 75 mcg at 03/17/17 0619  •  simvastatin (ZOCOR) tablet 10 mg, 10 mg, Oral, Q EVENING, Eugene Castro M.D., 10 mg at 03/16/17 2051  •  guaifenesin LA (MUCINEX) tablet 600 mg, 600 mg, Oral, Q12HRS, Eugene Castro M.D., 600 mg at 03/17/17 0832  •  budesonide-formoterol (SYMBICORT) 160-4.5 MCG/ACT inhaler 2 Puff, 2 Puff, Inhalation, BID, Sukumar Rouse, PHARMD, 2 Puff at 03/17/17 0833    Current Outpatient Medications:  Prescriptions prior to admission   Medication Sig Dispense Refill Last Dose   • insulin glargine (LANTUS) 100 UNIT/ML Solution Inject 20 Units as instructed every evening.   3/6/2017 at 2130   • duloxetine (CYMBALTA) 60 MG Cap DR Particles delayed-release capsule Take 60 mg by mouth every day.   3/7/2017 at 0800   • levothyroxine (SYNTHROID) 75 MCG Tab TAKE 1 TABLET BY MOUTH DAILY 90 Tab 3 3/7/2017 at 0500   • oxycodone immediate release (ROXICODONE) 10 MG immediate release tablet Take 1-2 Tabs by mouth every 6 hours as needed for Moderate Pain (back pain and knee pain). 168 Tab 0 3/6/2017 at 2130   • fluticasone-salmeterol (ADVAIR) 500-50 MCG/DOSE  AEROSOL POWDER, BREATH ACTIVATED Inhale 1 Puff by mouth every 12 hours. 1 Inhaler 5 3/7/2017 at 0800   • omeprazole (PRILOSEC) 20 MG delayed-release capsule Take 1 Cap by mouth every day. 90 Cap 1 3/7/2017 at 0800   • lisinopril (PRINIVIL, ZESTRIL) 40 MG tablet Take 1 Tab by mouth every day. 90 Tab 3 3/7/2017 at 0700   • trazodone (DESYREL) 100 MG Tab TAKE 3 TABLETS BY MOUTH NIGHTLY AT BEDTIME AS NEEDED FOR SLEEP 90 Tab 3 3/6/2017 at 2130   • Multiple Vitamins-Minerals (HAIR/SKIN/NAILS PO) Take 1 Tab by mouth every day.   3/7/2017 at 0800   • cyclobenzaprine (FLEXERIL) 10 MG Tab TAKE 1 TABLET BY MOUTH THREE TIMES DAILY AS NEEDED FOR MILD PAIN 90 Tab 11 3/6/2017 at 2200   • lactulose 10 GM/15ML Solution Take 30 mL by mouth 2 times a day as needed (constipation). 500 mL 3 3/6/2017 at 2130   • simvastatin (ZOCOR) 10 MG Tab TAKE 1 TABLET BY MOUTH EVERY EVENING 90 Tab 3 3/6/2017 at 2130   • albuterol (PROVENTIL) 2.5mg/3ml Nebu Soln solution for nebulization 3 mL by Nebulization route every four hours as needed for Shortness of Breath. 75 mL 3 3/7/2017 at 0700   • escitalopram (LEXAPRO) 10 MG Tab Take 1 Tab by mouth every day. 90 Tab 3 3/7/2017 at 0700   • albuterol (VENTOLIN OR PROVENTIL) 108 (90 BASE) MCG/ACT Aero Soln inhalation aerosol Inhale 2 Puffs by mouth every 6 hours as needed for Shortness of Breath. 8.5 g 3 3/7/2017 at 0700   • gabapentin (NEURONTIN) 600 MG tablet Take 1 Tab by mouth 3 times a day. 270 Tab 3 3/7/2017 at 0800   • montelukast (SINGULAIR) 10 MG Tab Take 1 Tab by mouth every day. 90 Tab 3 3/7/2017 at 0800   • Non Formulary Request Take 1 Tab by mouth every day. Antioxidants   3/7/2017 at 0800       Medication Allergy:  Allergies   Allergen Reactions   • Keflex Rash     Sever rash   • Codeine Nausea     Severe stomach pain   • Lyrica Nausea     Nausea, dizzy   • Sudafed Nausea and Unspecified     Chest pain, nausea       Assessment and Plan:  Acute on chronic hypoxic respiratory failure  - 2/2  pneumonia + pulmonary edema  - supplemental O2  - mobilize, IS  - Cont Home O2 at discharge     CAP- s/p completion of unasyn  - resolved    COPD- currently with good air movement and no wheezing. Ambulation mostly limited by LBP  - severity unknown  - 55+PY history of smoking, quit 2/28/17  - chronic productive cough, at baseline  - titrated down methylpred to 40mg BID, can transition to 60mg po prednisone in AM  - symbicort, spiriva, BRADY  - spiriva will be new prescription  - COPD education, vaccination  - Needs outpt pulm follow up-- PFTs, PSG, 6MWT, pulmonary rehab  - desaturation prior to discharge  PT OT    PHTN- 2013 estimated moderate in severity, grade 2 DD  -daily weights  - may need to be placed on lasix as out patient   -she needs cardiology follow up to manage her diastolic HF and fluid management     Please call for any question prior to discharge     Kodak Avila MD  PCCM

## 2017-03-18 ENCOUNTER — PATIENT OUTREACH (OUTPATIENT)
Dept: HEALTH INFORMATION MANAGEMENT | Facility: OTHER | Age: 67
End: 2017-03-18

## 2017-03-20 ENCOUNTER — TELEPHONE (OUTPATIENT)
Dept: MEDICAL GROUP | Facility: PHYSICIAN GROUP | Age: 67
End: 2017-03-20

## 2017-03-20 RX ORDER — TRAZODONE HYDROCHLORIDE 100 MG/1
TABLET ORAL
Qty: 90 TAB | Refills: 1 | Status: ON HOLD | OUTPATIENT
Start: 2017-03-20 | End: 2017-08-07 | Stop reason: SDUPTHER

## 2017-03-20 NOTE — TELEPHONE ENCOUNTER
Marina with [facility] admitted Priya on Sunday. Discharged from [facility] 3/17/17. Priya declined skilled nursing through [facility]. Priya not stable, non compliant with her O2, should be 24/7 however only using daily prn. RA 88-90%. Has appointment on 5/2017. Also, non- compliant with DM. Has appointment scheduled with [place] office 5/2017. Able to get her in on March 27. Placed on wait list to hopefully get her in this week.

## 2017-03-21 ENCOUNTER — PATIENT OUTREACH (OUTPATIENT)
Dept: HEALTH INFORMATION MANAGEMENT | Facility: OTHER | Age: 67
End: 2017-03-21

## 2017-03-21 NOTE — PROGRESS NOTES
"New referral to  from [facility] RN. [facility]WIN RN indicated that pt cannot afford specialist copayments and needs to follow up with Cardiologist and Pulmonologist.     LSW outreach phone call to pt and introduced self. Pt reports that she does need to see both specialists, but cannot afford the $50 copayments and the $40 for gas to travel to [place]. LSW inquired if pt has any other insurance policies, including Medicaid. Pt reports that she applied for Medicaid last year, but that she made too much money. Pt reports that her income was lowered as of 2017 and is now making \"under $1,100 gross\". LSW verified that this was the amount pt is making before any insurance is taken out. Pt agreeable. Pt reports that next month, Medicare will be taking out $300 for her Part B plan, and that they are back billing pt. Pt reports that she plans on contacting Medicare to verify. LSW discussed with pt that Medicaid has changed their income guidelines, and pt should reapply, as she is within the income threshold and they may be able to assist with payment for Part B plan. Pt agreeable.    LSW inquired if pt was able to be billed for copayment amount, if pt would be willing to see specialists. Pt agreeable. LSW discussed with pt the option of utilize Telehealth for specialist services, as pt would not have to drive to [place]. Pt agreeable. LSW discussed that she would verify that this would be an option for pt with both office Office Managers and call pt with information.    LSW inquired if pt needed any other assistance at this time. Pt reports that she cannot think of anything at this time and that she \"has had help every day\". LSW inquired who pt has been receiving help from. Pt reports that she has PT in the home from Home Health.    [facility] determine if [facility] can [facility] and assist with Medicaid Application.    LSW left voicemail with  and Telehealth to assist with pt's copayments and " determine if pt would be able to see a provider in a timely manner.    LSW verified with CC RN that a referral for RN Care Coordination had been made, as pt has multiple medical conditions. Referall to CC RN Tsering Monreal has bee made.    PLAN:  -LSW and pt discussed community resources.  -LSW to contact Specialist offices to be billed for copayment amounts.  -LSW to contact Telehealth to determine if pt will be able to meet with provider in timely manner. LSW will give pt Telehealth contact information.  -Pt to contact Telehealth to establish appointments.  -Pt to follow up with Cardiology and Pulmonology.

## 2017-03-22 ENCOUNTER — PATIENT OUTREACH (OUTPATIENT)
Dept: HEALTH INFORMATION MANAGEMENT | Facility: OTHER | Age: 67
End: 2017-03-22

## 2017-03-22 NOTE — Clinical Note
March 22, 2017        Priya Callahan  [address]  [place] [address]        Dear Pryia:    Attached is a Medicaid application. Due to your income change and the [facility] income guidlines, you may be eligible for assistance.     Please feel free to contact me, should you need assistance completing the application. You can also request a  from your Home Health Services come into the home to assist.        Sincerely,        LIZETT Austin, MSW    Electronically Signed

## 2017-03-22 NOTE — PROGRESS NOTES
LSW sent Medicaid application and information sheet for information needed to process application to pt.    See Letter in File.

## 2017-03-24 ENCOUNTER — PATIENT OUTREACH (OUTPATIENT)
Dept: HEALTH INFORMATION MANAGEMENT | Facility: OTHER | Age: 67
End: 2017-03-24

## 2017-03-24 NOTE — PROGRESS NOTES
Priya is feeling okay.  She is receiving HH services through [facility].  She did not have her oxygen so we placed a call to [facility] and they in turn reached out to the pt.  Priya is having a hard time financially.  We mailed out applications for utility and financial assistance and I will f/u with her.

## 2017-03-27 ENCOUNTER — OFFICE VISIT (OUTPATIENT)
Dept: MEDICAL GROUP | Facility: PHYSICIAN GROUP | Age: 67
End: 2017-03-27
Payer: MEDICARE

## 2017-03-27 VITALS
SYSTOLIC BLOOD PRESSURE: 124 MMHG | HEIGHT: 65 IN | OXYGEN SATURATION: 82 % | HEART RATE: 72 BPM | TEMPERATURE: 97.2 F | DIASTOLIC BLOOD PRESSURE: 76 MMHG | RESPIRATION RATE: 14 BRPM

## 2017-03-27 DIAGNOSIS — E78.5 HYPERLIPIDEMIA, UNSPECIFIED HYPERLIPIDEMIA TYPE: ICD-10-CM

## 2017-03-27 DIAGNOSIS — J43.9 PULMONARY EMPHYSEMA, UNSPECIFIED EMPHYSEMA TYPE (HCC): ICD-10-CM

## 2017-03-27 DIAGNOSIS — Z12.11 COLON CANCER SCREENING: ICD-10-CM

## 2017-03-27 DIAGNOSIS — J96.11 CHRONIC RESPIRATORY FAILURE WITH HYPOXIA (HCC): ICD-10-CM

## 2017-03-27 DIAGNOSIS — I10 ESSENTIAL HYPERTENSION: ICD-10-CM

## 2017-03-27 DIAGNOSIS — I50.33 ACUTE ON CHRONIC DIASTOLIC CHF (CONGESTIVE HEART FAILURE), NYHA CLASS 1 (HCC): ICD-10-CM

## 2017-03-27 DIAGNOSIS — E11.59 TYPE 2 DIABETES MELLITUS WITH OTHER CIRCULATORY COMPLICATION: ICD-10-CM

## 2017-03-27 DIAGNOSIS — E03.9 HYPOTHYROIDISM, UNSPECIFIED TYPE: ICD-10-CM

## 2017-03-27 DIAGNOSIS — E55.9 VITAMIN D DEFICIENCY: ICD-10-CM

## 2017-03-27 DIAGNOSIS — Z09 HOSPITAL DISCHARGE FOLLOW-UP: Primary | ICD-10-CM

## 2017-03-27 PROCEDURE — 3014F SCREEN MAMMO DOC REV: CPT | Mod: 8P | Performed by: FAMILY MEDICINE

## 2017-03-27 PROCEDURE — 1036F TOBACCO NON-USER: CPT | Performed by: FAMILY MEDICINE

## 2017-03-27 PROCEDURE — G8482 FLU IMMUNIZE ORDER/ADMIN: HCPCS | Performed by: FAMILY MEDICINE

## 2017-03-27 PROCEDURE — G8419 CALC BMI OUT NRM PARAM NOF/U: HCPCS | Performed by: FAMILY MEDICINE

## 2017-03-27 PROCEDURE — 99215 OFFICE O/P EST HI 40 MIN: CPT | Performed by: FAMILY MEDICINE

## 2017-03-27 PROCEDURE — 3288F FALL RISK ASSESSMENT DOCD: CPT | Performed by: FAMILY MEDICINE

## 2017-03-27 PROCEDURE — 4040F PNEUMOC VAC/ADMIN/RCVD: CPT | Performed by: FAMILY MEDICINE

## 2017-03-27 PROCEDURE — 1111F DSCHRG MED/CURRENT MED MERGE: CPT | Performed by: FAMILY MEDICINE

## 2017-03-27 PROCEDURE — 3045F PR MOST RECENT HEMOGLOBIN A1C LEVEL 7.0-9.0%: CPT | Performed by: FAMILY MEDICINE

## 2017-03-27 PROCEDURE — 0518F FALL PLAN OF CARE DOCD: CPT | Mod: 8P | Performed by: FAMILY MEDICINE

## 2017-03-27 PROCEDURE — G8432 DEP SCR NOT DOC, RNG: HCPCS | Performed by: FAMILY MEDICINE

## 2017-03-27 PROCEDURE — 1100F PTFALLS ASSESS-DOCD GE2>/YR: CPT | Performed by: FAMILY MEDICINE

## 2017-03-27 NOTE — MR AVS SNAPSHOT
"        Priya Clarkpson   3/27/2017 2:20 PM   Office Visit   MRN: [id number]    Department:  [facility]   Dept Phone:  [phone]    Description:  Female : 1950   Provider:  Eden Rojas M.D.           Reason for Visit     Hospital Follow-up pneumonia      Allergies as of 3/27/2017     Allergen Noted Reactions    Keflex 10/29/2013   Rash    Sever rash    Codeine 10/29/2013   Nausea    Severe stomach pain    Lyrica 10/29/2013   Nausea    Nausea, dizzy    Sudafed 10/29/2013   Nausea, Unspecified    Chest pain, nausea      You were diagnosed with     Essential hypertension   [4602543]       Pulmonary emphysema, unspecified emphysema type (CMS-HCC)   [0604655]       Acute on chronic diastolic CHF (congestive heart failure), NYHA class 1 (CMS-HCC)   [485146]       Medicare annual wellness visit, subsequent   [749661]       Hypothyroidism, unspecified type   [0135345]       Vitamin D deficiency   [8880068]       Hyperlipidemia, unspecified hyperlipidemia type   [0200217]       Type 2 diabetes mellitus with other circulatory complication (CMS-HCC)   [7874525]       Chronic respiratory failure with hypoxia (CMS-HCC)   [757690]       Colon cancer screening   [545235]         Vital Signs     Blood Pressure Pulse Temperature Respirations Height Oxygen Saturation    124/76 mmHg 72 36.2 °C (97.2 °F) 14 1.651 m (5' 5\") 82%    Smoking Status                   Former Smoker           Basic Information     Date Of Birth Sex Race Ethnicity Preferred Language    1950 Female White Non- English      Your appointments     May 02, 2017  2:20 PM   Diabetes Care Visit with Mickie Lawson M.D., YUNI DIABETES RN   [facility] ([address] [address] [phone]           You will be receiving a confirmation call a few days before your appointment from our automated call confirmation system.              Problem List              ICD-10-CM Priority " Class Noted - Resolved    Morbid obesity (CMS-HCC) E66.01 Low  10/30/2013 - Present    DM type 2, uncontrolled, with renal complications (CMS-HCC) E11.29, E11.65 Medium  10/30/2013 - Present    Chronic pain G89.29 Low  10/30/2013 - Present    Iron deficiency anemia D50.9 Medium  10/30/2013 - Present    Acute on chronic diastolic CHF (congestive heart failure), NYHA class 1 (CMS-HCC) I50.33   11/20/2013 - Present    Major depressive disorder F32.9   11/20/2013 - Present    COPD (chronic obstructive pulmonary disease) (CMS-HCC) J44.9   11/20/2013 - Present    HTN (hypertension) I10   11/20/2013 - Present    Chronic kidney disease, stage 3 N18.3   1/6/2014 - Present    Chronic knee pain M25.569, G89.29   2/25/2014 - Present    Osteoarthritis M19.90   2/25/2014 - Present    Insomnia G47.00   2/25/2014 - Present    Hair loss L65.9   2/25/2014 - Present    Hypothyroidism E03.9   4/1/2014 - Present    Vitamin D deficiency E55.9   6/12/2014 - Present    Lumbar spondylosis with myelopathy M47.16   6/17/2014 - Present    Nocturnal hypoxia G47.34   7/21/2014 - Present    Pain of right heel M79.671   7/21/2014 - Present    Chronic pain syndrome G89.4   8/28/2014 - Present    EDITH (obstructive sleep apnea) G47.33   1/27/2015 - Present    Hypertensive heart disease with heart failure (CMS-HCC) I11.0 High  4/27/2015 - Present    Chronic respiratory failure (CMS-HCC) J96.10   4/28/2015 - Present    Chronic constipation K59.09   7/7/2015 - Present    Pulmonary nodules R91.8   7/7/2015 - Present    Lumbosacral spondylosis without myelopathy M47.817   7/10/2015 - Present    Osteoarthritis, knee M17.9   9/2/2015 - Present    Osteoarthritis of spine with radiculopathy, lumbar region M47.26   10/2/2015 - Present    Bilateral hand pain M79.641, M79.642   12/21/2015 - Present    Primary osteoarthritis of both hands M19.041, M19.042   12/21/2015 - Present    Gastroesophageal reflux disease with esophagitis K21.0   12/22/2015 - Present     Myoclonic jerking G25.3   4/12/2016 - Present    Cough R05   8/18/2016 - Present    Other spondylosis with radiculopathy, lumbar region M47.26   9/16/2016 - Present    Tobacco abuse disorder Z72.0   9/27/2016 - Present    Toenail fungus B35.1   11/21/2016 - Present    CAP (community acquired pneumonia) J18.9   3/7/2017 - Present    Septic shock (CMS-HCC) A41.9, R65.21   3/7/2017 - Present    Hyponatremia E87.1   3/8/2017 - Present    Hyperkalemia E87.5   3/8/2017 - Present    Acute on chronic renal failure (CMS-HCC) N17.9, N18.9   3/8/2017 - Present    Acute on chronic respiratory failure (CMS-HCC) J96.20   3/8/2017 - Present    Severe sepsis with septic shock (CMS-HCC) A41.9, R65.21   3/8/2017 - Present    Anemia D64.9   3/8/2017 - Present      Health Maintenance        Date Due Completion Dates    RETINAL SCREENING 9/26/1967 ---    IMM DTaP/Tdap/Td Vaccine (1 - Tdap) 9/26/1968 ---    IMM ZOSTER VACCINE 9/26/2009 ---    BONE DENSITY 9/26/2014 ---    MAMMOGRAM 2/24/2016 2/24/2015, 2/24/2015, 2/24/2015, 2/24/2015, 2/24/2015, 2/17/2015    COLON CANCER SCREENING ANNUAL FIT 12/28/2016 12/28/2015, 3/7/2015    DIABETES MONOFILAMENT / LE EXAM 6/7/2017 6/7/2016    A1C SCREENING 9/7/2017 3/7/2017, 1/26/2017, 5/27/2016, 12/17/2015, 8/11/2015, 2/24/2015, 6/2/2014, 8/31/2012    URINE ACR / MICROALBUMIN 1/26/2018 1/26/2017, 12/17/2015, 2/24/2015, 8/31/2012    FASTING LIPID PROFILE 3/8/2018 3/8/2017, 1/26/2017, 12/17/2015, 2/24/2015, 6/2/2014, 8/31/2012    SERUM CREATININE 3/17/2018 3/17/2017, 3/16/2017, 3/15/2017, 3/14/2017, 3/13/2017, 3/12/2017, 3/11/2017, 3/10/2017, 3/9/2017, 3/8/2017, 3/7/2017, 1/26/2017, 5/27/2016, 12/17/2015, 8/11/2015, 5/8/2015, 3/19/2015, 2/27/2015, 2/24/2015, 6/2/2014, 1/13/2014, 11/22/2013, 11/21/2013, 11/20/2013, 11/19/2013, 11/14/2013, 11/13/2013, 11/12/2013, 11/11/2013, 11/10/2013, 11/9/2013, 11/8/2013, 11/7/2013, 11/6/2013, 11/5/2013, 11/4/2013, 11/3/2013, 11/2/2013, 11/1/2013, 10/31/2013,  10/30/2013, 10/29/2013, 8/31/2012            Current Immunizations     13-VALENT PCV PREVNAR 9/28/2016    Influenza TIV (IM) 9/27/2016, 11/11/2013 11:45 AM    Pneumococcal polysaccharide vaccine (PPSV-23) 12/11/2014, 10/22/2013      Below and/or attached are the medications your provider expects you to take. Review all of your home medications and newly ordered medications with your provider and/or pharmacist. Follow medication instructions as directed by your provider and/or pharmacist. Please keep your medication list with you and share with your provider. Update the information when medications are discontinued, doses are changed, or new medications (including over-the-counter products) are added; and carry medication information at all times in the event of emergency situations     Allergies:  KEFLEX - Rash     CODEINE - Nausea     LYRICA - Nausea     SUDAFED - Nausea,Unspecified               Medications  Valid as of: March 27, 2017 -  3:33 PM    Generic Name Brand Name Tablet Size Instructions for use    Albuterol Sulfate (Nebu Soln) PROVENTIL 2.5mg/3ml 3 mL by Nebulization route every four hours as needed for Shortness of Breath.        Cyclobenzaprine HCl (Tab) FLEXERIL 10 MG TAKE 1 TABLET BY MOUTH THREE TIMES DAILY AS NEEDED FOR MILD PAIN        DULoxetine HCl (Cap DR Particles) CYMBALTA 60 MG Take 60 mg by mouth every day.        Escitalopram Oxalate (Tab) LEXAPRO 10 MG Take 1 Tab by mouth every day.        Fluticasone-Salmeterol (AEROSOL POWDER, BREATH ACTIVATED) ADVAIR 500-50 MCG/DOSE Inhale 1 Puff by mouth every 12 hours.        Gabapentin (Tab) NEURONTIN 600 MG Take 1 Tab by mouth 3 times a day.        Insulin Glargine (Solution) LANTUS 100 UNIT/ML Inject 20 Units as instructed every evening.        Ipratropium-Albuterol (Aerosol) COMBIVENT  MCG/ACT Inhale 2 Puffs by mouth every 6 hours as needed for Shortness of Breath.        Lactulose (Solution) lactulose 10 GM/15ML Take 30 mL by mouth 2 times  a day as needed (constipation).        Levothyroxine Sodium (Tab) SYNTHROID 75 MCG TAKE 1 TABLET BY MOUTH DAILY        Lisinopril (Tab) PRINIVIL, ZESTRIL 40 MG Take 1 Tab by mouth every day.        Montelukast Sodium (Tab) SINGULAIR 10 MG Take 1 Tab by mouth every day.        Omeprazole (CAPSULE DELAYED RELEASE) PRILOSEC 20 MG Take 1 Cap by mouth every day.        OxyCODONE HCl (Tab) ROXICODONE 10 MG Take 1-2 Tabs by mouth every 6 hours as needed for Moderate Pain (back pain and knee pain).        PredniSONE (Tab) DELTASONE 10 MG PREDNISONE 60MG PO DAILY FOR THE 1ST 2DAYS  PREDNISONE 50MG PO DAILY FOR THE 2ND 2DAYS  PREDNISONE 40MG PO DAILY FOR THE 3RD 2DAYS  PREDNISONE 30MG PO DAILY FOR THE 4TH 2DAYS  PREDNISONE 20MG PO DAILY FOR THE 5TH 2DAYS  PREDNISONE 10MG PO DAILY FOR THE 6TH 2DAYS        Simvastatin (Tab) ZOCOR 10 MG TAKE 1 TABLET BY MOUTH EVERY EVENING        Tiotropium Bromide Monohydrate (Cap) SPIRIVA 18 MCG Inhale 1 Cap by mouth every day.        TraZODone HCl (Tab) DESYREL 100 MG TAKE 3 TABLETS BY MOUTH NIGHTLY AT BEDTIME AS NEEDED FOR SLEEP        .                 Medicines prescribed today were sent to:     [facility] [address]    [address] [place] [address]    Phone: [phone] Fax: [phone]    Open 24 Hours?: No      Medication refill instructions:       If your prescription bottle indicates you have medication refills left, it is not necessary to call your provider’s office. Please contact your pharmacy and they will refill your medication.    If your prescription bottle indicates you do not have any refills left, you may request refills at any time through one of the following ways: The online [employer] system (except Urgent Care), by calling your provider’s office, or by asking your pharmacy to contact your provider’s office with a refill request. Medication refills are processed only during regular business hours and may not be available until the next business  day. Your provider may request additional information or to have a follow-up visit with you prior to refilling your medication.   *Please Note: Medication refills are assigned a new Rx number when refilled electronically. Your pharmacy may indicate that no refills were authorized even though a new prescription for the same medication is available at the pharmacy. Please request the medicine by name with the pharmacy before contacting your provider for a refill.        Your To Do List     Future Labs/Procedures Complete By Expires    CBC WITH DIFFERENTIAL  As directed 3/28/2018    COMP METABOLIC PANEL  As directed 3/28/2018    HEMOGLOBIN A1C  As directed 3/28/2018    LIPID PROFILE  As directed 3/28/2018    MICROALBUMIN CREAT RATIO URINE  As directed 3/28/2018    OCCULT BLOOD FECES IMMUNOASSAY  As directed 3/28/2018    TSH  As directed 3/28/2018    VITAMIN D,25 HYDROXY  As directed 3/28/2018      Referral     A referral request has been sent to our patient care coordination department. Please allow 3-5 business days for us to process this request and contact you either by phone or mail. If you do not hear from us by the 5th business day, please call us at [phone].        Other Notes About Your Plan     On pain contract with Dr. Lawson  Last UDS: 3/26/2015 Dr Lawson  Contolled Substance agreement signed: 8/28/2014    Comprehensive Medication Review: appt 11.18.2014 in [place].                 InTouch Technology Access Code: [email or web address]  Expires: 4/26/2017  3:16 PM    [employer]  A secure, online tool to manage your health information     [employer]’s InTouch Technology® is a secure, online tool that connects you to your personalized health information from the privacy of your home -- day or night - making it very easy for you to manage your healthcare. Once the activation process is completed, you can even access your medical information using the InTouch Technology mercy, which is available for free in the Apple Mercy store or Google Play store.      "[employer] provides the following levels of access (as shown below):   My Chart Features   [facility] Primary [facility] Doctor [facility]  Specialists [facility]  Urgent  Care Non-[facility]  Primary Care  Doctor   Email your healthcare team securely and privately 24/7 X X X    Manage appointments: schedule your next appointment; view details of past/upcoming appointments X      Request prescription refills. X      View recent personal medical records, including lab and immunizations X X X X   View health record, including health history, allergies, medications X X X X   Read reports about your outpatient visits, procedures, consult and ER notes X X X X   See your discharge summary, which is a recap of your hospital and/or ER visit that includes your diagnosis, lab results, and care plan. X X       How to register for "[employer]:  1. Go to  [email or web address].  2. Click on the Sign Up Now box, which takes you to the New Member Sign Up page. You will need to provide the following information:  a. Enter your "[employer] Access Code exactly as it appears at the top of this page. (You will not need to use this code after you’ve completed the sign-up process. If you do not sign up before the expiration date, you must request a new code.)   b. Enter your date of birth.   c. Enter your home email address.   d. Click Submit, and follow the next screen’s instructions.  3. Create a "[employer] ID. This will be your "[employer] login ID and cannot be changed, so think of one that is secure and easy to remember.  4. Create a "[employer] password. You can change your password at any time.  5. Enter your Password Reset Question and Answer. This can be used at a later time if you forget your password.   6. Enter your e-mail address. This allows you to receive e-mail notifications when new information is available in "[employer].  7. Click Sign Up. You can now view your health information.    For assistance activating your "[employer] account, call [phone]

## 2017-03-28 NOTE — PROGRESS NOTES
Subjective:   CC: hospital discharge follow up    HPI:     Priya Callahan is a 66 y.o. female, established patient of the clinic, presents with the following concerns:     Pt is a 67 yo female with hx of DM, HTN, HLD, COPD secondary to chronic tobacco abuse, presents for hospital discharge follow up. Per chart review, pt was admitted to the hospital on 3/17/2017 for pneumonia, sepsis, COPD exacerbation, and acute on chronic respiratory failure. For PNA & sepsis, pt received IVF, Unasyn and Zithromax. For COPD, she received IV steroid and Symbicort. Pt was also treated for hyponatremia and hyperkalemia. CXR noted for patchy opacity of the right lung. Blood culture was negative x2. Urine culture was contaminated with mixed skin rojelio. Pt was treated in ICU with Pulm consultation service. Rehab was recommended, but pt declined. She was then discharged home with with oral prednisone, Spiriva, Advair, Albuterol, and continuous oxygen. Echo was done prior to discharge, which noted for EF of 60% w/o significant valvular disease. Grade 2 diastolic dysfunction and pulmonary HTN were noted.     Today, pt reports feeling somewhat better. She uses 3.0L of oxygen at night and 2.5L of oxygen during the day. She is able to carry out daily activities without problems. She states that she forgot to use oxygen one afternoon, and was doing well. She wants to know if she needs to continue using oxygen. She eats and sleeps well. Her energy is adequate. She has good social support. She takes all medications as instructed.      Current medicines (including changes today)  Current Outpatient Prescriptions   Medication Sig Dispense Refill   • albuterol-ipratropium (COMBIVENT)  MCG/ACT Aerosol Inhale 2 Puffs by mouth every 6 hours as needed for Shortness of Breath. 1 Inhaler 3   • trazodone (DESYREL) 100 MG Tab TAKE 3 TABLETS BY MOUTH NIGHTLY AT BEDTIME AS NEEDED FOR SLEEP 90 Tab 1   • tiotropium (SPIRIVA) 18 MCG Cap Inhale 1  Cap by mouth every day. 30 Cap 0   • predniSONE (DELTASONE) 10 MG Tab PREDNISONE 60MG PO DAILY FOR THE 1ST 2DAYS  PREDNISONE 50MG PO DAILY FOR THE 2ND 2DAYS  PREDNISONE 40MG PO DAILY FOR THE 3RD 2DAYS  PREDNISONE 30MG PO DAILY FOR THE 4TH 2DAYS  PREDNISONE 20MG PO DAILY FOR THE 5TH 2DAYS  PREDNISONE 10MG PO DAILY FOR THE 6TH 2DAYS 42 Tab 0   • insulin glargine (LANTUS) 100 UNIT/ML Solution Inject 20 Units as instructed every evening.     • duloxetine (CYMBALTA) 60 MG Cap DR Particles delayed-release capsule Take 60 mg by mouth every day.     • levothyroxine (SYNTHROID) 75 MCG Tab TAKE 1 TABLET BY MOUTH DAILY 90 Tab 3   • oxycodone immediate release (ROXICODONE) 10 MG immediate release tablet Take 1-2 Tabs by mouth every 6 hours as needed for Moderate Pain (back pain and knee pain). 168 Tab 0   • fluticasone-salmeterol (ADVAIR) 500-50 MCG/DOSE AEROSOL POWDER, BREATH ACTIVATED Inhale 1 Puff by mouth every 12 hours. 1 Inhaler 5   • omeprazole (PRILOSEC) 20 MG delayed-release capsule Take 1 Cap by mouth every day. 90 Cap 1   • lisinopril (PRINIVIL, ZESTRIL) 40 MG tablet Take 1 Tab by mouth every day. 90 Tab 3   • cyclobenzaprine (FLEXERIL) 10 MG Tab TAKE 1 TABLET BY MOUTH THREE TIMES DAILY AS NEEDED FOR MILD PAIN 90 Tab 11   • lactulose 10 GM/15ML Solution Take 30 mL by mouth 2 times a day as needed (constipation). 500 mL 3   • simvastatin (ZOCOR) 10 MG Tab TAKE 1 TABLET BY MOUTH EVERY EVENING 90 Tab 3   • albuterol (PROVENTIL) 2.5mg/3ml Nebu Soln solution for nebulization 3 mL by Nebulization route every four hours as needed for Shortness of Breath. 75 mL 3   • escitalopram (LEXAPRO) 10 MG Tab Take 1 Tab by mouth every day. 90 Tab 3   • gabapentin (NEURONTIN) 600 MG tablet Take 1 Tab by mouth 3 times a day. 270 Tab 3   • montelukast (SINGULAIR) 10 MG Tab Take 1 Tab by mouth every day. 90 Tab 3     No current facility-administered medications for this visit.     She  has a past medical history of COPD; ASTHMA;  "Hypertension; Pneumonia; Bronchitis; Unspecified urinary incontinence; Fall; Infectious disease; Arthritis; Fibromyalgia; Neuropathy (CMS-HCC); Other specified symptom associated with female genital organs; Diabetes; Hepatitis C; Congestive heart failure (CMS-[facility]) (2013); Indigestion; GERD (gastroesophageal reflux disease); Breath shortness; Sleep apnea; Disorder of thyroid; Backpain; Heart burn; Pain (9/13/2016); and Psychiatric problem (9/13/2016).    I personally reviewed patient's problem list, allergies, medications, family hx, social hx with patient and update EPIC.     REVIEW OF SYSTEMS:  CONSTITUTIONAL:  Denies night sweats, fatigue, malaise, lethargy, fever or chills.  RESPIRATORY:  Denies cough, wheeze, hemoptysis,   CARDIOVASCULAR:  Denies chest pains, palpitations, pedal edema  GASTROINTESTINAL:  Denies abdominal pain, nausea or vomiting, diarrhea, constipation, hematemesis, hematochezia, melena.  GENITOURINARY:  Denies urinary urgency, frequency, dysuria, or hematuria.       Objective:     Blood pressure 124/76, pulse 72, temperature 36.2 °C (97.2 °F), resp. rate 14, height 1.651 m (5' 5\"), SpO2 82 %. There is no weight on file to calculate BMI.    Physical Exam:  Constitutional: awake, alert, in no distress, morbidly obese.   Skin: Warm, dry, good turgor, no rashes, bruises, ulcers in visible areas.  - multiple ecchemoses on forearm bilaterally.   Eye: conjunctiva clear, lids neg for edema or lesions.  Neck: Trachea midline, no masses, no thyromegaly. No cervical or supraclavicular lymphadenopathy  Respiratory: Unlabored respiratory effort, lungs clear to auscultation, no wheezes, no rhonchi.  Cardiovascular: Normal S1, S2, no murmur, no pedal edema.  Abdomen: Soft, non-tender to palpation, no hernia, no hepatosplenomegaly.  Neuro: CN2-12 grossly intact. Strength 5/5, reflexes 2/4 in all extremities, no sensory deficits.   Psych: Oriented x3, affect and mood wnl, intact judgement and insight. "       Assessment and Plan:   The following treatment plan was discussed    1. Essential hypertension  Chronic, well controlled with Lisinopril, /76. Plan:   - continue Lisinopril 40 mg qd.   - CBC WITH DIFFERENTIAL; Future  - COMP METABOLIC PANEL; Future  - MICROALBUMIN CREAT RATIO URINE; Future    2. Pulmonary emphysema, unspecified emphysema type (CMS-HCC)  3. Chronic respiratory failure with hypoxia (CMS-[facility])  Chronic, secondary to chronic tobacco abuse, pt used to smoke 1 ppd x 50 years, she has not had a cigarette since hospital discharge. She has not had PFT done. Plan:   - advised pt to complete oral steroid prescribed by the hospital  - continue Spiriva and Advair  - discontinued Albuterol, start Combiven  - ambulatory oxgyen evaluation done today. Oxygen saturation drops to 82% with ambulation. Advised pt to use 3L of oxygen at night and 2.5 L during the day.   - REFERRAL TO PULMONOLOGY for PFT and consultation.     4. Acute on chronic diastolic CHF (congestive heart failure), NYHA class 1 (CMS-[facility])  Well compensated, no e/o fluid overload. Plan:   - REFERRAL TO CARDIOLOGY    5. Hypothyroidism, unspecified type  Chronic, well controlled wit Levothyroxine 75 mcg qd, will continue.   - TSH; Future    6. Vitamin D deficiency  - VITAMIN D,25 HYDROXY; Future    7. Hyperlipidemia, unspecified hyperlipidemia type  Chronic, currently taking Zocor 10 mg qd. Recommended moderate-high intensity statin given medical hx, will defer this discussion to PCP at follow up visit.   - LIPID PROFILE; Future    8. Type 2 diabetes mellitus with other circulatory complication (HCC)  Chronic, worsening glycemic control, A1C was 7.9 couple weeks ago. Pt is taking 20 units of Lantus, but she has also been on multiple courses of steroid due to respiratory problems. Given hx of multiple severe illness, it is not unreasonable to set goal A1C < 8.0. Will not change diabetic regimen at this time. Advised dietary modification, weight  loss, and exercises.  Pt is to follow up with new PCP in couple months for DM follow up. Plan:   - COMP METABOLIC PANEL; Future  - HEMOGLOBIN A1C; Future    9. Colon cancer screening  - OCCULT BLOOD FECES IMMUNOASSAY; Future    Total 40 minutes face-to-face time spent with patient, with greater than 50% of the total time discussing patient's issues and symptoms as listed above in assessment and plan, as well as managing coordination of care for future evaluation and treatment.        Eden Rojas M.D.      Followup: Return in about 3 months (around 6/27/2017) for Long, Multiple issues.    Please note that this dictation was created using voice recognition software. I have made every reasonable attempt to correct obvious errors, but I expect that there are errors of grammar and possibly content that I did not discover before finalizing the note.

## 2017-04-01 ENCOUNTER — HOSPITAL ENCOUNTER (INPATIENT)
Facility: MEDICAL CENTER | Age: 67
LOS: 8 days | DRG: 871 | End: 2017-04-09
Attending: EMERGENCY MEDICINE | Admitting: INTERNAL MEDICINE
Payer: MEDICARE

## 2017-04-01 ENCOUNTER — APPOINTMENT (OUTPATIENT)
Dept: RADIOLOGY | Facility: MEDICAL CENTER | Age: 67
DRG: 871 | End: 2017-04-01
Attending: EMERGENCY MEDICINE
Payer: MEDICARE

## 2017-04-01 ENCOUNTER — RESOLUTE PROFESSIONAL BILLING HOSPITAL PROF FEE (OUTPATIENT)
Dept: HOSPITALIST | Facility: MEDICAL CENTER | Age: 67
End: 2017-04-01
Payer: MEDICARE

## 2017-04-01 ENCOUNTER — APPOINTMENT (OUTPATIENT)
Dept: RADIOLOGY | Facility: MEDICAL CENTER | Age: 67
DRG: 871 | End: 2017-04-01
Payer: MEDICARE

## 2017-04-01 DIAGNOSIS — R65.21 SEPTIC SHOCK (HCC): ICD-10-CM

## 2017-04-01 DIAGNOSIS — J18.9 PNEUMONIA OF BOTH LUNGS DUE TO INFECTIOUS ORGANISM, UNSPECIFIED PART OF LUNG: ICD-10-CM

## 2017-04-01 DIAGNOSIS — A41.9 SEPTIC SHOCK (HCC): ICD-10-CM

## 2017-04-01 DIAGNOSIS — J43.9 PULMONARY EMPHYSEMA, UNSPECIFIED EMPHYSEMA TYPE (HCC): ICD-10-CM

## 2017-04-01 LAB
ALBUMIN SERPL BCP-MCNC: 3.2 G/DL (ref 3.2–4.9)
ALBUMIN/GLOB SERPL: 1.2 G/DL
ALP SERPL-CCNC: 53 U/L (ref 30–99)
ALT SERPL-CCNC: 15 U/L (ref 2–50)
AMORPH CRY #/AREA URNS HPF: PRESENT /HPF
ANION GAP SERPL CALC-SCNC: 6 MMOL/L (ref 0–11.9)
ANISOCYTOSIS BLD QL SMEAR: ABNORMAL
APPEARANCE UR: CLEAR
AST SERPL-CCNC: 13 U/L (ref 12–45)
BACTERIA #/AREA URNS HPF: ABNORMAL /HPF
BASOPHILS # BLD AUTO: 0.1 % (ref 0–1.8)
BASOPHILS # BLD: 0.01 K/UL (ref 0–0.12)
BILIRUB SERPL-MCNC: 0.7 MG/DL (ref 0.1–1.5)
BILIRUB UR QL STRIP.AUTO: NEGATIVE
BUN SERPL-MCNC: 34 MG/DL (ref 8–22)
CALCIUM SERPL-MCNC: 8.7 MG/DL (ref 8.5–10.5)
CHLORIDE SERPL-SCNC: 99 MMOL/L (ref 96–112)
CO2 SERPL-SCNC: 30 MMOL/L (ref 20–33)
COLOR UR: YELLOW
COMMENT 1642: NORMAL
CREAT SERPL-MCNC: 1.62 MG/DL (ref 0.5–1.4)
EOSINOPHIL # BLD AUTO: 0.09 K/UL (ref 0–0.51)
EOSINOPHIL NFR BLD: 0.9 % (ref 0–6.9)
EPI CELLS #/AREA URNS HPF: ABNORMAL /HPF
ERYTHROCYTE [DISTWIDTH] IN BLOOD BY AUTOMATED COUNT: 69.7 FL (ref 35.9–50)
FLUAV H1 2009 PAND RNA SPEC QL NAA+PROBE: NOT DETECTED
FLUAV RNA SPEC QL NAA+PROBE: NEGATIVE
FLUAV+FLUBV AG SPEC QL IA: NORMAL
FLUBV RNA SPEC QL NAA+PROBE: NEGATIVE
GFR SERPL CREATININE-BSD FRML MDRD: 32 ML/MIN/1.73 M 2
GLOBULIN SER CALC-MCNC: 2.6 G/DL (ref 1.9–3.5)
GLUCOSE BLD-MCNC: 108 MG/DL (ref 65–99)
GLUCOSE SERPL-MCNC: 101 MG/DL (ref 65–99)
GLUCOSE UR STRIP.AUTO-MCNC: NEGATIVE MG/DL
HCT VFR BLD AUTO: 27 % (ref 37–47)
HGB BLD-MCNC: 7.8 G/DL (ref 12–16)
HYPOCHROMIA BLD QL SMEAR: ABNORMAL
IMM GRANULOCYTES # BLD AUTO: 0.04 K/UL (ref 0–0.11)
IMM GRANULOCYTES NFR BLD AUTO: 0.4 % (ref 0–0.9)
KETONES UR STRIP.AUTO-MCNC: NEGATIVE MG/DL
LACTATE BLD-SCNC: 0.8 MMOL/L (ref 0.5–2)
LEUKOCYTE ESTERASE UR QL STRIP.AUTO: NEGATIVE
LYMPHOCYTES # BLD AUTO: 0.91 K/UL (ref 1–4.8)
LYMPHOCYTES NFR BLD: 8.8 % (ref 22–41)
MACROCYTES BLD QL SMEAR: ABNORMAL
MCH RBC QN AUTO: 26.7 PG (ref 27–33)
MCHC RBC AUTO-ENTMCNC: 28.9 G/DL (ref 33.6–35)
MCV RBC AUTO: 92.5 FL (ref 81.4–97.8)
MICRO URNS: ABNORMAL
MICROCYTES BLD QL SMEAR: ABNORMAL
MONOCYTES # BLD AUTO: 0.93 K/UL (ref 0–0.85)
MONOCYTES NFR BLD AUTO: 8.9 % (ref 0–13.4)
MORPHOLOGY BLD-IMP: NORMAL
MUCOUS THREADS #/AREA URNS HPF: ABNORMAL /HPF
NEUTROPHILS # BLD AUTO: 8.42 K/UL (ref 2–7.15)
NEUTROPHILS NFR BLD: 80.9 % (ref 44–72)
NITRITE UR QL STRIP.AUTO: NEGATIVE
NRBC # BLD AUTO: 0 K/UL
NRBC BLD AUTO-RTO: 0 /100 WBC
PH UR STRIP.AUTO: 5.5 [PH]
PLATELET # BLD AUTO: 177 K/UL (ref 164–446)
PLATELET BLD QL SMEAR: NORMAL
PMV BLD AUTO: 9.2 FL (ref 9–12.9)
POLYCHROMASIA BLD QL SMEAR: NORMAL
POTASSIUM SERPL-SCNC: 5.2 MMOL/L (ref 3.6–5.5)
PROT SERPL-MCNC: 5.8 G/DL (ref 6–8.2)
PROT UR QL STRIP: 70 MG/DL
RBC # BLD AUTO: 2.92 M/UL (ref 4.2–5.4)
RBC BLD AUTO: PRESENT
RBC UR QL AUTO: NEGATIVE
SIGNIFICANT IND 70042: NORMAL
SITE SITE: NORMAL
SODIUM SERPL-SCNC: 135 MMOL/L (ref 135–145)
SOURCE SOURCE: NORMAL
SP GR UR STRIP.AUTO: 1.01
WBC # BLD AUTO: 10.4 K/UL (ref 4.8–10.8)

## 2017-04-01 PROCEDURE — 700111 HCHG RX REV CODE 636 W/ 250 OVERRIDE (IP): Performed by: EMERGENCY MEDICINE

## 2017-04-01 PROCEDURE — 770022 HCHG ROOM/CARE - ICU (200)

## 2017-04-01 PROCEDURE — 93005 ELECTROCARDIOGRAM TRACING: CPT | Performed by: EMERGENCY MEDICINE

## 2017-04-01 PROCEDURE — 80053 COMPREHEN METABOLIC PANEL: CPT

## 2017-04-01 PROCEDURE — 71010 DX-CHEST-PORTABLE (1 VIEW): CPT

## 2017-04-01 PROCEDURE — 87400 INFLUENZA A/B EACH AG IA: CPT

## 2017-04-01 PROCEDURE — 51702 INSERT TEMP BLADDER CATH: CPT

## 2017-04-01 PROCEDURE — 96368 THER/DIAG CONCURRENT INF: CPT

## 2017-04-01 PROCEDURE — C1751 CATH, INF, PER/CENT/MIDLINE: HCPCS

## 2017-04-01 PROCEDURE — 96365 THER/PROPH/DIAG IV INF INIT: CPT

## 2017-04-01 PROCEDURE — 36556 INSERT NON-TUNNEL CV CATH: CPT

## 2017-04-01 PROCEDURE — 70450 CT HEAD/BRAIN W/O DYE: CPT

## 2017-04-01 PROCEDURE — 02HV33Z INSERTION OF INFUSION DEVICE INTO SUPERIOR VENA CAVA, PERCUTANEOUS APPROACH: ICD-10-PCS | Performed by: EMERGENCY MEDICINE

## 2017-04-01 PROCEDURE — 36620 INSERTION CATHETER ARTERY: CPT | Performed by: INTERNAL MEDICINE

## 2017-04-01 PROCEDURE — 700101 HCHG RX REV CODE 250: Performed by: INTERNAL MEDICINE

## 2017-04-01 PROCEDURE — 94640 AIRWAY INHALATION TREATMENT: CPT

## 2017-04-01 PROCEDURE — 700105 HCHG RX REV CODE 258: Performed by: EMERGENCY MEDICINE

## 2017-04-01 PROCEDURE — 87086 URINE CULTURE/COLONY COUNT: CPT

## 2017-04-01 PROCEDURE — 36415 COLL VENOUS BLD VENIPUNCTURE: CPT

## 2017-04-01 PROCEDURE — 700101 HCHG RX REV CODE 250: Performed by: EMERGENCY MEDICINE

## 2017-04-01 PROCEDURE — 700111 HCHG RX REV CODE 636 W/ 250 OVERRIDE (IP): Performed by: INTERNAL MEDICINE

## 2017-04-01 PROCEDURE — 700105 HCHG RX REV CODE 258: Performed by: INTERNAL MEDICINE

## 2017-04-01 PROCEDURE — 82962 GLUCOSE BLOOD TEST: CPT

## 2017-04-01 PROCEDURE — 99291 CRITICAL CARE FIRST HOUR: CPT

## 2017-04-01 PROCEDURE — 96366 THER/PROPH/DIAG IV INF ADDON: CPT

## 2017-04-01 PROCEDURE — 81001 URINALYSIS AUTO W/SCOPE: CPT

## 2017-04-01 PROCEDURE — 87503 INFLUENZA DNA AMP PROB ADDL: CPT

## 2017-04-01 PROCEDURE — A9270 NON-COVERED ITEM OR SERVICE: HCPCS | Performed by: EMERGENCY MEDICINE

## 2017-04-01 PROCEDURE — 83605 ASSAY OF LACTIC ACID: CPT

## 2017-04-01 PROCEDURE — 87502 INFLUENZA DNA AMP PROBE: CPT

## 2017-04-01 PROCEDURE — 93970 EXTREMITY STUDY: CPT

## 2017-04-01 PROCEDURE — 71010 DX-CHEST-LIMITED (1 VIEW): CPT

## 2017-04-01 PROCEDURE — 700102 HCHG RX REV CODE 250 W/ 637 OVERRIDE(OP): Performed by: EMERGENCY MEDICINE

## 2017-04-01 PROCEDURE — 96367 TX/PROPH/DG ADDL SEQ IV INF: CPT

## 2017-04-01 PROCEDURE — 85025 COMPLETE CBC W/AUTO DIFF WBC: CPT

## 2017-04-01 PROCEDURE — A9270 NON-COVERED ITEM OR SERVICE: HCPCS | Performed by: INTERNAL MEDICINE

## 2017-04-01 PROCEDURE — 303105 HCHG CATHETER EXTRA

## 2017-04-01 PROCEDURE — 87040 BLOOD CULTURE FOR BACTERIA: CPT | Mod: 91

## 2017-04-01 PROCEDURE — 700102 HCHG RX REV CODE 250 W/ 637 OVERRIDE(OP): Performed by: INTERNAL MEDICINE

## 2017-04-01 PROCEDURE — 99291 CRITICAL CARE FIRST HOUR: CPT | Mod: 25 | Performed by: INTERNAL MEDICINE

## 2017-04-01 RX ORDER — ACETAMINOPHEN 325 MG/1
650 TABLET ORAL ONCE
Status: COMPLETED | OUTPATIENT
Start: 2017-04-01 | End: 2017-04-01

## 2017-04-01 RX ORDER — ONDANSETRON 2 MG/ML
4 INJECTION INTRAMUSCULAR; INTRAVENOUS EVERY 4 HOURS PRN
Status: DISCONTINUED | OUTPATIENT
Start: 2017-04-01 | End: 2017-04-09 | Stop reason: HOSPADM

## 2017-04-01 RX ORDER — TIOTROPIUM BROMIDE 18 UG/1
1 CAPSULE ORAL; RESPIRATORY (INHALATION)
Status: DISCONTINUED | OUTPATIENT
Start: 2017-04-01 | End: 2017-04-02

## 2017-04-01 RX ORDER — HEPARIN SODIUM 5000 [USP'U]/ML
5000 INJECTION, SOLUTION INTRAVENOUS; SUBCUTANEOUS EVERY 8 HOURS
Status: DISCONTINUED | OUTPATIENT
Start: 2017-04-01 | End: 2017-04-09 | Stop reason: HOSPADM

## 2017-04-01 RX ORDER — SODIUM CHLORIDE 9 MG/ML
1000 INJECTION, SOLUTION INTRAVENOUS
Status: DISCONTINUED | OUTPATIENT
Start: 2017-04-01 | End: 2017-04-04

## 2017-04-01 RX ORDER — ONDANSETRON 4 MG/1
4 TABLET, ORALLY DISINTEGRATING ORAL EVERY 4 HOURS PRN
Status: DISCONTINUED | OUTPATIENT
Start: 2017-04-01 | End: 2017-04-09 | Stop reason: HOSPADM

## 2017-04-01 RX ORDER — LEVOTHYROXINE SODIUM 0.07 MG/1
75 TABLET ORAL
Status: DISCONTINUED | OUTPATIENT
Start: 2017-04-02 | End: 2017-04-09 | Stop reason: HOSPADM

## 2017-04-01 RX ORDER — ESCITALOPRAM OXALATE 10 MG/1
10 TABLET ORAL DAILY
Status: DISCONTINUED | OUTPATIENT
Start: 2017-04-02 | End: 2017-04-09 | Stop reason: HOSPADM

## 2017-04-01 RX ORDER — IPRATROPIUM BROMIDE AND ALBUTEROL SULFATE 2.5; .5 MG/3ML; MG/3ML
3 SOLUTION RESPIRATORY (INHALATION)
Status: DISCONTINUED | OUTPATIENT
Start: 2017-04-01 | End: 2017-04-02

## 2017-04-01 RX ORDER — DEXTROSE MONOHYDRATE 25 G/50ML
25 INJECTION, SOLUTION INTRAVENOUS
Status: DISCONTINUED | OUTPATIENT
Start: 2017-04-01 | End: 2017-04-09 | Stop reason: HOSPADM

## 2017-04-01 RX ORDER — SODIUM CHLORIDE 9 MG/ML
30 INJECTION, SOLUTION INTRAVENOUS
Status: COMPLETED | OUTPATIENT
Start: 2017-04-01 | End: 2017-04-01

## 2017-04-01 RX ORDER — BUDESONIDE AND FORMOTEROL FUMARATE DIHYDRATE 160; 4.5 UG/1; UG/1
2 AEROSOL RESPIRATORY (INHALATION) 2 TIMES DAILY
Status: DISCONTINUED | OUTPATIENT
Start: 2017-04-01 | End: 2017-04-09 | Stop reason: HOSPADM

## 2017-04-01 RX ORDER — SODIUM CHLORIDE 9 MG/ML
INJECTION, SOLUTION INTRAVENOUS CONTINUOUS
Status: DISPENSED | OUTPATIENT
Start: 2017-04-01 | End: 2017-04-02

## 2017-04-01 RX ORDER — SODIUM CHLORIDE 9 MG/ML
1000 INJECTION, SOLUTION INTRAVENOUS ONCE
Status: COMPLETED | OUTPATIENT
Start: 2017-04-01 | End: 2017-04-01

## 2017-04-01 RX ORDER — CIPROFLOXACIN 2 MG/ML
400 INJECTION, SOLUTION INTRAVENOUS EVERY 12 HOURS
Status: DISCONTINUED | OUTPATIENT
Start: 2017-04-01 | End: 2017-04-02

## 2017-04-01 RX ORDER — METHYLPREDNISOLONE SODIUM SUCCINATE 125 MG/2ML
62.5 INJECTION, POWDER, LYOPHILIZED, FOR SOLUTION INTRAMUSCULAR; INTRAVENOUS EVERY 6 HOURS
Status: DISCONTINUED | OUTPATIENT
Start: 2017-04-01 | End: 2017-04-03

## 2017-04-01 RX ORDER — ACETAMINOPHEN 325 MG/1
650 TABLET ORAL EVERY 6 HOURS PRN
Status: DISCONTINUED | OUTPATIENT
Start: 2017-04-01 | End: 2017-04-09 | Stop reason: HOSPADM

## 2017-04-01 RX ORDER — MONTELUKAST SODIUM 10 MG/1
10 TABLET ORAL DAILY
Status: DISCONTINUED | OUTPATIENT
Start: 2017-04-02 | End: 2017-04-09 | Stop reason: HOSPADM

## 2017-04-01 RX ORDER — FAMOTIDINE 20 MG/1
20 TABLET, FILM COATED ORAL DAILY
Status: DISCONTINUED | OUTPATIENT
Start: 2017-04-01 | End: 2017-04-09 | Stop reason: HOSPADM

## 2017-04-01 RX ORDER — DULOXETIN HYDROCHLORIDE 30 MG/1
30 CAPSULE, DELAYED RELEASE ORAL DAILY
Status: DISCONTINUED | OUTPATIENT
Start: 2017-04-02 | End: 2017-04-04

## 2017-04-01 RX ORDER — NOREPINEPHRINE BITARTRATE 1 MG/ML
INJECTION, SOLUTION INTRAVENOUS
Status: DISCONTINUED
Start: 2017-04-01 | End: 2017-04-01

## 2017-04-01 RX ORDER — SIMVASTATIN 20 MG
10 TABLET ORAL EVERY EVENING
Status: DISCONTINUED | OUTPATIENT
Start: 2017-04-01 | End: 2017-04-09 | Stop reason: HOSPADM

## 2017-04-01 RX ORDER — IPRATROPIUM BROMIDE AND ALBUTEROL SULFATE 2.5; .5 MG/3ML; MG/3ML
3 SOLUTION RESPIRATORY (INHALATION)
Status: DISCONTINUED | OUTPATIENT
Start: 2017-04-01 | End: 2017-04-09 | Stop reason: HOSPADM

## 2017-04-01 RX ADMIN — TIOTROPIUM BROMIDE 1 CAPSULE: 18 CAPSULE ORAL; RESPIRATORY (INHALATION) at 22:10

## 2017-04-01 RX ADMIN — SODIUM CHLORIDE 1000 ML: 9 INJECTION, SOLUTION INTRAVENOUS at 22:15

## 2017-04-01 RX ADMIN — ACETAMINOPHEN 650 MG: 325 TABLET, FILM COATED ORAL at 17:45

## 2017-04-01 RX ADMIN — SODIUM CHLORIDE 1000 ML: 9 INJECTION, SOLUTION INTRAVENOUS at 20:15

## 2017-04-01 RX ADMIN — SODIUM CHLORIDE 2859 ML: 9 INJECTION, SOLUTION INTRAVENOUS at 17:40

## 2017-04-01 RX ADMIN — HEPARIN SODIUM 5000 UNITS: 5000 INJECTION, SOLUTION INTRAVENOUS; SUBCUTANEOUS at 22:27

## 2017-04-01 RX ADMIN — CIPROFLOXACIN 400 MG: 2 INJECTION, SOLUTION INTRAVENOUS at 22:29

## 2017-04-01 RX ADMIN — METHYLPREDNISOLONE SODIUM SUCCINATE 62.5 MG: 125 INJECTION, POWDER, FOR SOLUTION INTRAMUSCULAR; INTRAVENOUS at 22:26

## 2017-04-01 RX ADMIN — SIMVASTATIN 10 MG: 20 TABLET, FILM COATED ORAL at 22:27

## 2017-04-01 RX ADMIN — BUDESONIDE AND FORMOTEROL FUMARATE DIHYDRATE 2 PUFF: 160; 4.5 AEROSOL RESPIRATORY (INHALATION) at 22:30

## 2017-04-01 RX ADMIN — NOREPINEPHRINE BITARTRATE 10 MCG/MIN: 1 INJECTION INTRAVENOUS at 18:30

## 2017-04-01 RX ADMIN — PIPERACILLIN AND TAZOBACTAM 4.5 G: 4; .5 INJECTION, POWDER, LYOPHILIZED, FOR SOLUTION INTRAVENOUS; PARENTERAL at 22:28

## 2017-04-01 RX ADMIN — SODIUM CHLORIDE 1000 ML: 9 INJECTION, SOLUTION INTRAVENOUS at 23:56

## 2017-04-01 RX ADMIN — IPRATROPIUM BROMIDE AND ALBUTEROL SULFATE 3 ML: .5; 3 SOLUTION RESPIRATORY (INHALATION) at 22:43

## 2017-04-01 RX ADMIN — VANCOMYCIN HYDROCHLORIDE 2400 MG: 100 INJECTION, POWDER, LYOPHILIZED, FOR SOLUTION INTRAVENOUS at 18:10

## 2017-04-01 RX ADMIN — NOREPINEPHRINE BITARTRATE 25 MCG/MIN: 1 INJECTION INTRAVENOUS at 20:00

## 2017-04-01 RX ADMIN — FAMOTIDINE 20 MG: 20 TABLET, FILM COATED ORAL at 22:26

## 2017-04-01 RX ADMIN — PIPERACILLIN AND TAZOBACTAM 4.5 G: 4; .5 INJECTION, POWDER, LYOPHILIZED, FOR SOLUTION INTRAVENOUS; PARENTERAL at 17:41

## 2017-04-01 ASSESSMENT — LIFESTYLE VARIABLES
ALCOHOL_USE: NO
PACK_YEARS: 35
PACK_YEARS: 35
EVER_SMOKED: YES
EVER_SMOKED: YES

## 2017-04-01 ASSESSMENT — COPD QUESTIONNAIRES
DURING THE PAST 4 WEEKS HOW MUCH DID YOU FEEL SHORT OF BREATH: SOME OF THE TIME
DO YOU EVER COUGH UP ANY MUCUS OR PHLEGM?: NO/ONLY WITH OCCASIONAL COLDS OR INFECTIONS
HAVE YOU SMOKED AT LEAST 100 CIGARETTES IN YOUR ENTIRE LIFE: YES
COPD SCREENING SCORE: 6

## 2017-04-01 ASSESSMENT — PAIN SCALES - GENERAL
PAINLEVEL_OUTOF10: 4
PAINLEVEL_OUTOF10: 6

## 2017-04-01 NOTE — IP AVS SNAPSHOT
" <p align=\"LEFT\"><IMG SRC=\"//[email or web address]$/[email or web address]\" alt=\"Image\" WIDTH=\"50%\" HEIGHT=\"200\" BORDER=\"\"></p>                   Name:Priya Callahan  Medical Record Number:[id number]  CSN: [id number]    YOB: 1950   Age: 66 y.o.  Sex: female  HT:1.664 m (5' 5.5\") WT: 102 kg (224 lb 13.9 oz)          Admit Date: 4/1/2017     Discharge Date:   Today's Date: 4/9/2017  Attending Doctor:  Norman Boyce M.D.                  Allergies:  Keflex; Codeine; Lyrica; and Sudafed          Your appointments     Apr 10, 2017  9:30 AM   CARE MANAGER TELEPHONE VISIT with CARE MANAGER   [address]  [facility] [id number] [phone]           ***IMPORTANT**** This is a phone visit only. Do not come into the office. The Care Manager will call you at the scheduled time for Care Manager Telephone Visit.            Apr 11, 2017  2:40 PM   Established Patient with Ira Roy M.D.   [address]  [facility] [id number] [phone]           You will be receiving a confirmation call a few days before your appointment from our automated call confirmation system.            May 03, 2017  9:30 AM   Telemedicine Clinic New Patient with Alejandro Green MD,FACC, TELEMEDICINE [place], TELEMED CARDIOLOGY -[facility]   [facility]    [address] [facility] [address]   [phone]            May 03, 2017  1:40 PM   NEW TO YOU with Kavitha Mccall M.D.   [facility]    [address] [facility] [address]   [phone]            Jun 26, 2017 11:40 AM   Telemedicine Clinic Established Pt with [facility], [facility], TELEMEDICINE [facility]   Centralized Scheduling (--)    [address]  [place] [id number] [phone]                 Medication List      Take these Medications        Instructions    albuterol 2.5mg/3ml Nebu " solution for nebulization   Commonly known as:  PROVENTIL    3 mL by Nebulization route every four hours as needed for Shortness of Breath.   Dose:  2.5 mg       * albuterol-ipratropium  MCG/ACT Aero   What changed:  Another medication with the same name was added. Make sure you understand how and when to take each.   Commonly known as:  COMBIVENT    Inhale 2 Puffs by mouth every 6 hours as needed for Shortness of Breath.   Dose:  2 Puff       * ipratropium-albuterol  MCG/ACT Aers   What changed:  You were already taking a medication with the same name, and this prescription was added. Make sure you understand how and when to take each.   Commonly known as:  COMBIVENT RESPIMAT    Inhale 1 Puff by mouth 4 times a day.   Dose:  1 Puff       amlodipine 10 MG Tabs   Commonly known as:  NORVASC    Take 1 Tab by mouth every day.   Dose:  10 mg       ascorbic acid 500 MG tablet   Commonly known as:  VITAMIN C    Take 1 Tab by mouth 3 times a day, with meals.   Dose:  500 mg       carvedilol 3.125 MG Tabs   Commonly known as:  COREG    Take 1 Tab by mouth 2 times a day, with meals.   Dose:  3.125 mg       cyclobenzaprine 10 MG Tabs   Commonly known as:  FLEXERIL    TAKE 1 TABLET BY MOUTH THREE TIMES DAILY AS NEEDED FOR MILD PAIN       duloxetine 60 MG Cpep delayed-release capsule   Commonly known as:  CYMBALTA    Take 60 mg by mouth every day.   Dose:  60 mg       escitalopram 10 MG Tabs   Commonly known as:  LEXAPRO    Take 1 Tab by mouth every day.   Dose:  10 mg       ferrous gluconate 324 (38 FE) MG Tabs   Commonly known as:  FERGON    Take 1 Tab by mouth 2 times a day, with meals.   Dose:  324 mg       fluticasone-salmeterol 500-50 MCG/DOSE Aepb   Commonly known as:  ADVAIR    Doctor's comments:  Needs OV by 9/2016   Inhale 1 Puff by mouth every 12 hours.   Dose:  1 Puff       gabapentin 600 MG tablet   Commonly known as:  NEURONTIN    Take 1 Tab by mouth 3 times a day.   Dose:  600 mg        hydrochlorothiazide 25 MG Tabs   Commonly known as:  HYDRODIURIL    Take 1 Tab by mouth every day.   Dose:  25 mg       insulin glargine 100 UNIT/ML Soln   Commonly known as:  LANTUS    Inject 20 Units as instructed every evening.   Dose:  20 Units       lactulose 10 GM/15ML Soln    Take 30 mL by mouth 2 times a day as needed (constipation).   Dose:  20 g       levothyroxine 75 MCG Tabs   Commonly known as:  SYNTHROID    TAKE 1 TABLET BY MOUTH DAILY       lisinopril 40 MG tablet   Commonly known as:  PRINIVIL, ZESTRIL    Take 1 Tab by mouth every day.   Dose:  40 mg       montelukast 10 MG Tabs   Commonly known as:  SINGULAIR    Take 1 Tab by mouth every day.   Dose:  10 mg       multivitamin Tabs    Take 1 Tab by mouth every day.   Dose:  1 Tab       omeprazole 20 MG delayed-release capsule   Commonly known as:  PRILOSEC    Take 1 Cap by mouth every day.   Dose:  20 mg       oxycodone immediate release 10 MG immediate release tablet   Commonly known as:  ROXICODONE    Doctor's comments:  May fill 4/17/17   Take 1-2 Tabs by mouth every 6 hours as needed for Moderate Pain (back pain and knee pain).   Dose:  10-20 mg       potassium chloride SA 20 MEQ Tbcr   Commonly known as:  Kdur    Take 1 Tab by mouth every day.   Dose:  20 mEq       predniSONE 10 MG Tabs   What changed:  additional instructions   Commonly known as:  DELTASONE    Take 30 mg for two days, Then 20 mg for two days, Then 10 mg for two days and stop then.       simvastatin 10 MG Tabs   Commonly known as:  ZOCOR    TAKE 1 TABLET BY MOUTH EVERY EVENING       tiotropium 18 MCG Caps   Commonly known as:  SPIRIVA    Inhale 1 Cap by mouth every day.   Dose:  18 mcg       trazodone 100 MG Tabs   Commonly known as:  DESYREL    Doctor's comments:  PLEASE REPLY   TAKE 3 TABLETS BY MOUTH NIGHTLY AT BEDTIME AS NEEDED FOR SLEEP       * Notice:  This list has 2 medication(s) that are the same as other medications prescribed for you. Read the directions carefully,  and ask your doctor or other care provider to review them with you.

## 2017-04-01 NOTE — IP AVS SNAPSHOT
[email or web address].com Access Code: [email or web address]  Expires: 4/26/2017  3:16 PM    Your email address is not on file at [facility].  Email Addresses are required for you to sign up for [email or web address], please contact [phone] to verify your personal information and to provide your email address prior to attempting to register for [email or web address].    Priya Clarkpson  [address] [facility] [place] [address]    LaunchSide[employer]com  A secure, online tool to manage your health information     [facility]’s [employer]Side[employer]com® is a secure, online tool that connects you to your personalized health information from the privacy of your home -- day or night - making it very easy for you to manage your healthcare. Once the activation process is completed, you can even access your medical information using the LaunchSide[employer]com mercy, which is available for free in the Apple Mercy store or Google Play store.     To learn more about [email or web address], visit [email or web address]    There are two levels of access available (as shown below):   My Chart Features  [facility] Primary Care Doctor [facility]  Specialists [facility]  Urgent  Care Non-[facility] Primary Care Doctor   Email your healthcare team securely and privately 24/7 X X X    Manage appointments: schedule your next appointment; view details of past/upcoming appointments X      Request prescription refills. X      View recent personal medical records, including lab and immunizations X X X X   View health record, including health history, allergies, medications X X X X   Read reports about your outpatient visits, procedures, consult and ER notes X X X X   See your discharge summary, which is a recap of your hospital and/or ER visit that includes your diagnosis, lab results, and care plan X X  X     How to register for [email or web address]:  Once your e-mail address has been verified, follow the following steps to sign up for [email or web address].     1. Go to  [email or web address]  2. Click on the Sign Up Now box, which takes you to the New Member Sign Up page.  You will need to provide the following information:  a. Enter your [employer] Access Code exactly as it appears at the top of this page. (You will not need to use this code after you’ve completed the sign-up process. If you do not sign up before the expiration date, you must request a new code.)   b. Enter your date of birth.   c. Enter your home email address.   d. Click Submit, and follow the next screen’s instructions.  3. Create a ImageProtectt ID. This will be your [employer] login ID and cannot be changed, so think of one that is secure and easy to remember.  4. Create a [employer] password. You can change your password at any time.  5. Enter your Password Reset Question and Answer. This can be used at a later time if you forget your password.   6. Enter your e-mail address. This allows you to receive e-mail notifications when new information is available in [employer].  7. Click Sign Up. You can now view your health information.    For assistance activating your [employer] account, call [phone]

## 2017-04-01 NOTE — IP AVS SNAPSHOT
" Home Care Instructions                                                                                                                  Name:Priya Callahan  Medical Record Number:[id number]  CSN: [id number]    YOB: 1950   Age: 66 y.o.  Sex: female  HT:1.664 m (5' 5.5\") WT: 102 kg (224 lb 13.9 oz)          Admit Date: 4/1/2017     Discharge Date:   Today's Date: 4/9/2017  Attending Doctor:  Norman Boyce M.D.                  Allergies:  Keflex; Codeine; Lyrica; and Sudafed            Discharge Instructions       Things to follow up after discharge:   1) Follow up with hospital discharge clinic or PCP within one week of hospital discharge.   2) You completed treatment of pneumonia during hospital stay. COPD exacerbation was noted. You need to be on prednisone 30 mg once daily (Three pills) for two days, Then 20 mg once daily (Two pills) for two days and then 10 mg once daily (one pill) for two days. Take as prescribed. In addition continue breathing treatments at home as needed. Continue home oxygen and continue the use of your inhalers. You will need lung function testing and follow up with lung doctors in four weeks of hospital discharge.   3) You have elevated blood pressures. New medications have started including the use of HCTZ (water pill), Amlodipine and carvedilol. Take as prescribed. Monitor blood pressures daily. Write results in a diary and share with your providers on follow up. Given you are started on the water pill HCTZ take potassium as prescribed. In one week time your primary care provider or hospital discharge clinic needs to monitor your kidney function and electrolyte levels. Discuss with them please.   4) Continue diabetic regimen at home. Monitor blood sugars at home. Write results in a diary. Share with hospital discharge clinic / PCP on follow up.   5) You have low blood counts. You needed a blood transfusion during hospital stay. Take Iron and multivitamins as prescribed. " PCP needs to monitor counts in one month of hospital discharge and consider further evaluation and management as indicated.    Discharge Instructions    Discharged to home by car with relative. Discharged via wheelchair, hospital escort: Yes.  Special equipment needed: Not Applicable    Be sure to schedule a follow-up appointment with your primary care doctor or any specialists as instructed.     Discharge Plan:   Diet Plan: Discussed  Activity Level: Discussed  Smoking Cessation Offered: Patient Refused  Confirmed Follow up Appointment: Appointment Scheduled  Confirmed Symptoms Management: Discussed  Medication Reconciliation Updated: Yes  Influenza Vaccine Indication: Not indicated: Previously immunized this influenza season and > 8 years of age    I understand that a diet low in cholesterol, fat, and sodium is recommended for good health. Unless I have been given specific instructions below for another diet, I accept this instruction as my diet prescription.   Other diet: Diabetic    Special Instructions: None    · Is patient discharged on Warfarin / Coumadin?   No     · Is patient Post Blood Transfusion?  Yes  POST BLOOD TRANSFUSION INFORMATION (ADULT)    The purpose of blood transfusion is to correct anemia, low levels of blood clotting factors or to correct acute blood loss.   Blood transfusion is very safe but occasionally unexpected adverse reactions do occur. Most adverse reactions occur during transfusion, within one to two days following transfusion or one to two weeks following transfusion. Some adverse reactions can occur one to six months after transfusion and some even years later.             If any of the symptoms listed below happen to you during your transfusion,                                 please notify your nurse immediately.   · Fever or Chills  · Flushing of the Face  · Hives, rash or itching  · Difficulty in breathing or shortness of breath  · Pain or oozing of blood from the IV needle  site  · Low back pain  · Nausea or vomiting  · Weakness or fainting  · Chest pain  · Blood in the urine  · Decreased frequency of urination    The above symptoms may also occur within 24 to 48 after transfusion; if so, notify your physician.     · Yellowing of the skin    This can happen one to six months after transfusion; if so, notify your physician    Amlodipine tablets  What is this medicine?  AMLODIPINE (am DEWEY di peen) is a calcium-channel blocker. It affects the amount of calcium found in your heart and muscle cells. This relaxes your blood vessels, which can reduce the amount of work the heart has to do. This medicine is used to lower high blood pressure. It is also used to prevent chest pain.  This medicine may be used for other purposes; ask your health care provider or pharmacist if you have questions.  COMMON BRAND NAME(S): Norvasc  What should I tell my health care provider before I take this medicine?  They need to know if you have any of these conditions:  -heart problems like heart failure or aortic stenosis  -liver disease  -an unusual or allergic reaction to amlodipine, other medicines, foods, dyes, or preservatives  -pregnant or trying to get pregnant  -breast-feeding  How should I use this medicine?  Take this medicine by mouth with a glass of water. Follow the directions on the prescription label. Take your medicine at regular intervals. Do not take more medicine than directed.  Talk to your pediatrician regarding the use of this medicine in children. Special care may be needed. This medicine has been used in children as young as 6.  Persons over 65 years old may have a stronger reaction to this medicine and need smaller doses.  Overdosage: If you think you have taken too much of this medicine contact a poison control center or emergency room at once.  NOTE: This medicine is only for you. Do not share this medicine with others.  What if I miss a dose?  If you miss a dose, take it as soon as you  can. If it is almost time for your next dose, take only that dose. Do not take double or extra doses.  What may interact with this medicine?  -herbal or dietary supplements  -local or general anesthetics  -medicines for high blood pressure  -medicines for prostate problems  -rifampin  This list may not describe all possible interactions. Give your health care provider a list of all the medicines, herbs, non-prescription drugs, or dietary supplements you use. Also tell them if you smoke, drink alcohol, or use illegal drugs. Some items may interact with your medicine.  What should I watch for while using this medicine?  Visit your doctor or health care professional for regular check ups. Check your blood pressure and pulse rate regularly. Ask your health care professional what your blood pressure and pulse rate should be, and when you should contact him or her.  This medicine may make you feel confused, dizzy or lightheaded. Do not drive, use machinery, or do anything that needs mental alertness until you know how this medicine affects you. To reduce the risk of dizzy or fainting spells, do not sit or stand up quickly, especially if you are an older patient. Avoid alcoholic drinks; they can make you more dizzy.  Do not suddenly stop taking amlodipine. Ask your doctor or health care professional how you can gradually reduce the dose.  What side effects may I notice from receiving this medicine?  Side effects that you should report to your doctor or health care professional as soon as possible:  -allergic reactions like skin rash, itching or hives, swelling of the face, lips, or tongue  -breathing problems  -changes in vision or hearing  -chest pain  -fast, irregular heartbeat  -swelling of legs or ankles  Side effects that usually do not require medical attention (report to your doctor or health care professional if they continue or are bothersome):  -dry mouth  -facial flushing  -nausea, vomiting  -stomach gas,  pain  -tired, weak  -trouble sleeping  This list may not describe all possible side effects. Call your doctor for medical advice about side effects. You may report side effects to FDA at 2-975-VLX-2185.  Where should I keep my medicine?  Keep out of the reach of children.  Store at room temperature between 59 and 86 degrees F (15 and 30 degrees C). Protect from light. Keep container tightly closed. Throw away any unused medicine after the expiration date.  NOTE: This sheet is a summary. It may not cover all possible information. If you have questions about this medicine, talk to your doctor, pharmacist, or health care provider.  © 2014, [employer]. (11/15/2013 11:40:58 AM)    Carvedilol tablets  What is this medicine?  CARVEDILOL (SHIRLEY ve dil ol) is a beta-blocker. Beta-blockers reduce the workload on the heart and help it to beat more regularly. This medicine is used to treat high blood pressure and heart failure.  This medicine may be used for other purposes; ask your health care provider or pharmacist if you have questions.  COMMON BRAND NAME(S): Coreg  What should I tell my health care provider before I take this medicine?  They need to know if you have any of these conditions:  -circulation problems  -diabetes  -history of heart attack or heart disease  -liver disease  -lung or breathing disease, like asthma or emphysema  -pheochromocytoma  -slow or irregular heartbeat  -thyroid disease  -an unusual or allergic reaction to carvedilol, other beta-blockers, medicines, foods, dyes, or preservatives  -pregnant or trying to get pregnant  -breast-feeding  How should I use this medicine?  Take this medicine by mouth with a glass of water. Follow the directions on the prescription label. It is best to take the tablets with food. Take your doses at regular intervals. Do not take your medicine more often than directed. Do not stop taking except on the advice of your doctor or health care professional.  Talk to  your pediatrician regarding the use of this medicine in children. Special care may be needed.  Overdosage: If you think you have taken too much of this medicine contact a poison control center or emergency room at once.  NOTE: This medicine is only for you. Do not share this medicine with others.  What if I miss a dose?  If you miss a dose, take it as soon as you can. If it is almost time for your next dose, take only that dose. Do not take double or extra doses.  What may interact with this medicine?  Do not take this medicine with any of the following medications:  -sotalol  This medicine may also interact with the following medications:  -cimetidine  -clonidine  -cyclosporine  -digoxin  -MAOIs like Carbex, Eldepryl, Marplan, Nardil, and Parnate  -medicines for blood pressure, heart disease, irregular heart beat  -medicines for depression like fluoxetine and paroxetine  -medicines for diabetes  -medicines to control heart rhythm like propafenone and quinidine  -reserpine  -rifampin  This list may not describe all possible interactions. Give your health care provider a list of all the medicines, herbs, non-prescription drugs, or dietary supplements you use. Also tell them if you smoke, drink alcohol, or use illegal drugs. Some items may interact with your medicine.  What should I watch for while using this medicine?  Check your heart rate and blood pressure regularly while you are taking this medicine. Ask your doctor or health care professional what your heart rate and blood pressure should be, and when you should contact him or her. Do not stop taking this medicine suddenly. This could lead to serious heart-related effects.  Contact your doctor or health care professional if you have difficulty breathing while taking this drug.  Check your weight daily. Ask your doctor or health care professional when you should notify him/her of any weight gain.  You may get drowsy or dizzy. Do not drive, use machinery, or do  anything that requires mental alertness until you know how this medicine affects you. To reduce the risk of dizzy or fainting spells, do not sit or stand up quickly. Alcohol can make you more drowsy, and increase flushing and rapid heartbeats. Avoid alcoholic drinks.  If you have diabetes, check your blood sugar as directed. Tell your doctor if you have changes in your blood sugar while you are taking this medicine.  If you are going to have surgery, tell your doctor or health care professional that you are taking this medicine.  What side effects may I notice from receiving this medicine?  Side effects that you should report to your doctor or health care professional as soon as possible:  -allergic reactions like skin rash, itching or hives, swelling of the face, lips, or tongue  -breathing problems  -dark urine  -irregular heartbeat  -swollen legs or ankles  -vomiting  -yellowing of the eyes or skin  Side effects that usually do not require medical attention (report to your doctor or health care professional if they continue or are bothersome):  -change in sex drive or performance  -diarrhea  -dry eyes (especially if wearing contact lenses)  -dry, itching skin  -headache  -nausea  -unusually tired  This list may not describe all possible side effects. Call your doctor for medical advice about side effects. You may report side effects to FDA at 1-019-FDA-7093.  Where should I keep my medicine?  Keep out of the reach of children.  Store at room temperature below 30 degrees C (86 degrees F). Protect from moisture. Keep container tightly closed. Throw away any unused medicine after the expiration date.  NOTE: This sheet is a summary. It may not cover all possible information. If you have questions about this medicine, talk to your doctor, pharmacist, or health care provider.  © 2014, [employer]Gold Standard. (3/12/2009 2:39:22 PM)    Hydrochlorothiazide, HCTZ capsules or tablets  What is this  medicine?  HYDROCHLOROTHIAZIDE (odilia droe klor oh THYE a zide) is a diuretic. It increases the amount of urine passed, which causes the body to lose salt and water. This medicine is used to treat high blood pressure. It is also reduces the swelling and water retention caused by various medical conditions, such as heart, liver, or kidney disease.  This medicine may be used for other purposes; ask your health care provider or pharmacist if you have questions.  COMMON BRAND NAME(S): Esidrix, Ezide, HydroDIURIL, Microzide, Oretic, Zide  What should I tell my health care provider before I take this medicine?  They need to know if you have any of these conditions:  -diabetes  -gout  -immune system problems, like lupus  -kidney disease or kidney stones  -liver disease  -pancreatitis  -small amount of urine or difficulty passing urine  -an unusual or allergic reaction to hydrochlorothiazide, sulfa drugs, other medicines, foods, dyes, or preservatives  -pregnant or trying to get pregnant  -breast-feeding  How should I use this medicine?  Take this medicine by mouth with a glass of water. Follow the directions on the prescription label. Take your medicine at regular intervals. Remember that you will need to pass urine frequently after taking this medicine. Do not take your doses at a time of day that will cause you problems. Do not stop taking your medicine unless your doctor tells you to.  Talk to your pediatrician regarding the use of this medicine in children. Special care may be needed.  Overdosage: If you think you have taken too much of this medicine contact a poison control center or emergency room at once.  NOTE: This medicine is only for you. Do not share this medicine with others.  What if I miss a dose?  If you miss a dose, take it as soon as you can. If it is almost time for your next dose, take only that dose. Do not take double or extra doses.  What may interact with this  medicine?  -cholestyramine  -colestipol  -digoxin  -dofetilide  -lithium  -medicines for blood pressure  -medicines for diabetes  -medicines that relax muscles for surgery  -other diuretics  -steroid medicines like prednisone or cortisone  This list may not describe all possible interactions. Give your health care provider a list of all the medicines, herbs, non-prescription drugs, or dietary supplements you use. Also tell them if you smoke, drink alcohol, or use illegal drugs. Some items may interact with your medicine.  What should I watch for while using this medicine?  Visit your doctor or health care professional for regular checks on your progress. Check your blood pressure as directed. Ask your doctor or health care professional what your blood pressure should be and when you should contact him or her.  You may need to be on a special diet while taking this medicine. Ask your doctor.  Check with your doctor or health care professional if you get an attack of severe diarrhea, nausea and vomiting, or if you sweat a lot. The loss of too much body fluid can make it dangerous for you to take this medicine.  You may get drowsy or dizzy. Do not drive, use machinery, or do anything that needs mental alertness until you know how this medicine affects you. Do not stand or sit up quickly, especially if you are an older patient. This reduces the risk of dizzy or fainting spells. Alcohol may interfere with the effect of this medicine. Avoid alcoholic drinks.  This medicine may affect your blood sugar level. If you have diabetes, check with your doctor or health care professional before changing the dose of your diabetic medicine.  This medicine can make you more sensitive to the sun. Keep out of the sun. If you cannot avoid being in the sun, wear protective clothing and use sunscreen. Do not use sun lamps or tanning beds/booths.  What side effects may I notice from receiving this medicine?  Side effects that you should  report to your doctor or health care professional as soon as possible:  -allergic reactions such as skin rash or itching, hives, swelling of the lips, mouth, tongue, or throat  -changes in vision  -chest pain  -eye pain  -fast or irregular heartbeat  -feeling faint or lightheaded, falls  -gout attack  -muscle pain or cramps  -pain or difficulty when passing urine  -pain, tingling, numbness in the hands or feet  -redness, blistering, peeling or loosening of the skin, including inside the mouth  -unusually weak or tired  Side effects that usually do not require medical attention (report to your doctor or health care professional if they continue or are bothersome):  -change in sex drive or performance  -dry mouth  -headache  -stomach upset  This list may not describe all possible side effects. Call your doctor for medical advice about side effects. You may report side effects to FDA at 5-071-FDA-5381.  Where should I keep my medicine?  Keep out of the reach of children.  Store at room temperature between 15 and 30 degrees C (59 and 86 degrees F). Do not freeze. Protect from light and moisture. Keep container closed tightly. Throw away any unused medicine after the expiration date.  NOTE: This sheet is a summary. It may not cover all possible information. If you have questions about this medicine, talk to your doctor, pharmacist, or health care provider.  © 2014, [employer]/Gold Standard. (8/12/2011 12:57:37 PM)      Depression / Suicide Risk    As you are discharged from this [facility] facility, it is important to learn how to keep safe from harming yourself.    Recognize the warning signs:  · Abrupt changes in personality, positive or negative- including increase in energy   · Giving away possessions  · Change in eating patterns- significant weight changes-  positive or negative  · Change in sleeping patterns- unable to sleep or sleeping all the time   · Unwillingness or inability to  communicate  · Depression  · Unusual sadness, discouragement and loneliness  · Talk of wanting to die  · Neglect of personal appearance   · Rebelliousness- reckless behavior  · Withdrawal from people/activities they love  · Confusion- inability to concentrate     If you or a loved one observes any of these behaviors or has concerns about self-harm, here's what you can do:  · Talk about it- your feelings and reasons for harming yourself  · Remove any means that you might use to hurt yourself (examples: pills, rope, extension cords, firearm)  · Get professional help from the community (Mental Health, Substance Abuse, psychological counseling)  · Do not be alone:Call your Safe Contact- someone whom you trust who will be there for you.  · Call your local CRISIS HOTLINE 444-9763 or 744-492-0179  · Call your local Children's Mobile Crisis Response Team [place] (648) 963-6263 or www.[place] [facility]  · Call the toll free National Suicide Prevention Hotlines   · National Suicide Prevention Lifeline 355-425-WVBM (7834)  · [employer] 800-SUICIDE (996-9490)        Your appointments     Apr 10, 2017  9:30 AM   CARE MANAGER TELEPHONE VISIT with CARE MANAGER   [address]  [facility] [id number] [phone]           ***IMPORTANT**** This is a phone visit only. Do not come into the office. The Care Manager will call you at the scheduled time for Care Manager Telephone Visit.            Apr 11, 2017  2:40 PM   Established Patient with Ira Roy M.D.   [facility])    [address] [place] [address]  [facility] [id number] [phone]           You will be receiving a confirmation call a few days before your appointment from our automated call confirmation system.            May 03, 2017  9:30 AM   Telemedicine Clinic New Patient with Alejanrdo Green MD,FACC, TELEMEDICINE [place], TELEMED CARDIOLOGY -[facility]   [facility] [place]  ([place])    [address]  [place] [address]   [phone]            May 03, 2017  1:40 PM   NEW TO YOU with Kavitha Mccall M.D.   [facility] [place] ([place])    [address]  [place] [address]   [phone]            Jun 26, 2017 11:40 AM   Telemedicine Clinic Established Pt with [facility], [facility], TELEMEDICINE [place]   Centralized Scheduling (--)    [address]  [place] [address]   [phone]                 Discharge Medication Instructions:    Below are the medications your physician expects you to take upon discharge:    Review all your home medications and newly ordered medications with your doctor and/or pharmacist. Follow medication instructions as directed by your doctor and/or pharmacist.    Please keep your medication list with you and share with your physician.               Medication List      START taking these medications        Instructions    amlodipine 10 MG Tabs   Last time this was given:  10 mg on 4/9/2017  7:35 AM   Commonly known as:  NORVASC    Take 1 Tab by mouth every day.   Dose:  10 mg       ascorbic acid 500 MG tablet   Last time this was given:  500 mg on 4/8/2017  7:21 AM   Commonly known as:  VITAMIN C    Take 1 Tab by mouth 3 times a day, with meals.   Dose:  500 mg       carvedilol 3.125 MG Tabs   Last time this was given:  3.125 mg on 4/9/2017  7:36 AM   Commonly known as:  COREG    Take 1 Tab by mouth 2 times a day, with meals.   Dose:  3.125 mg       ferrous gluconate 324 (38 FE) MG Tabs   Last time this was given:  324 mg on 4/9/2017  7:34 AM   Commonly known as:  FERGON    Take 1 Tab by mouth 2 times a day, with meals.   Dose:  324 mg       hydrochlorothiazide 25 MG Tabs   Last time this was given:  25 mg on 4/9/2017  7:35 AM   Commonly known as:  HYDRODIURIL    Take 1 Tab by mouth every day.   Dose:  25 mg       multivitamin Tabs   Last time this was given:  1 Tab on 4/9/2017  7:35 AM     Take 1 Tab by mouth every day.   Dose:  1 Tab       potassium chloride SA 20 MEQ Tbcr   Last time this was given:  20 mEq on 4/9/2017  7:36 AM   Commonly known as:  Kdur    Take 1 Tab by mouth every day.   Dose:  20 mEq         CHANGE how you take these medications        Instructions    * albuterol-ipratropium  MCG/ACT Aero   What changed:  Another medication with the same name was added. Make sure you understand how and when to take each.   Commonly known as:  COMBIVENT    Inhale 2 Puffs by mouth every 6 hours as needed for Shortness of Breath.   Dose:  2 Puff       * ipratropium-albuterol  MCG/ACT Aers   What changed:  You were already taking a medication with the same name, and this prescription was added. Make sure you understand how and when to take each.   Commonly known as:  COMBIVENT RESPIMAT    Inhale 1 Puff by mouth 4 times a day.   Dose:  1 Puff       predniSONE 10 MG Tabs   What changed:  additional instructions   Last time this was given:  30 mg on 4/9/2017  7:37 AM   Commonly known as:  DELTASONE    Take 30 mg for two days, Then 20 mg for two days, Then 10 mg for two days and stop then.       * Notice:  This list has 2 medication(s) that are the same as other medications prescribed for you. Read the directions carefully, and ask your doctor or other care provider to review them with you.      CONTINUE taking these medications        Instructions    albuterol 2.5mg/3ml Nebu solution for nebulization   Commonly known as:  PROVENTIL    3 mL by Nebulization route every four hours as needed for Shortness of Breath.   Dose:  2.5 mg       cyclobenzaprine 10 MG Tabs   Last time this was given:  10 mg on 4/9/2017 12:32 AM   Commonly known as:  FLEXERIL    TAKE 1 TABLET BY MOUTH THREE TIMES DAILY AS NEEDED FOR MILD PAIN       duloxetine 60 MG Cpep delayed-release capsule   Last time this was given:  60 mg on 4/9/2017  7:34 AM   Commonly known as:  CYMBALTA    Take 60 mg by mouth every day.   Dose:   60 mg       escitalopram 10 MG Tabs   Last time this was given:  10 mg on 4/9/2017  7:35 AM   Commonly known as:  LEXAPRO    Take 1 Tab by mouth every day.   Dose:  10 mg       fluticasone-salmeterol 500-50 MCG/DOSE Aepb   Commonly known as:  ADVAIR    Doctor's comments:  Needs OV by 9/2016   Inhale 1 Puff by mouth every 12 hours.   Dose:  1 Puff       gabapentin 600 MG tablet   Commonly known as:  NEURONTIN    Take 1 Tab by mouth 3 times a day.   Dose:  600 mg       insulin glargine 100 UNIT/ML Soln   Last time this was given:  10 Units on 4/8/2017 10:46 PM   Commonly known as:  LANTUS    Inject 20 Units as instructed every evening.   Dose:  20 Units       lactulose 10 GM/15ML Soln    Take 30 mL by mouth 2 times a day as needed (constipation).   Dose:  20 g       levothyroxine 75 MCG Tabs   Last time this was given:  75 mcg on 4/9/2017  6:01 AM   Commonly known as:  SYNTHROID    TAKE 1 TABLET BY MOUTH DAILY       lisinopril 40 MG tablet   Last time this was given:  40 mg on 4/9/2017  7:35 AM   Commonly known as:  PRINIVIL, ZESTRIL    Take 1 Tab by mouth every day.   Dose:  40 mg       montelukast 10 MG Tabs   Last time this was given:  10 mg on 4/9/2017  7:35 AM   Commonly known as:  SINGULAIR    Take 1 Tab by mouth every day.   Dose:  10 mg       omeprazole 20 MG delayed-release capsule   Commonly known as:  PRILOSEC    Take 1 Cap by mouth every day.   Dose:  20 mg       oxycodone immediate release 10 MG immediate release tablet   Last time this was given:  10 mg on 4/9/2017 10:45 AM   Commonly known as:  ROXICODONE    Doctor's comments:  May fill 4/17/17   Take 1-2 Tabs by mouth every 6 hours as needed for Moderate Pain (back pain and knee pain).   Dose:  10-20 mg       simvastatin 10 MG Tabs   Last time this was given:  10 mg on 4/8/2017  7:44 PM   Commonly known as:  ZOCOR    TAKE 1 TABLET BY MOUTH EVERY EVENING       tiotropium 18 MCG Caps   Last time this was given:  1 Cap on 4/9/2017  7:43 AM   Commonly  known as:  SPIRIVA    Inhale 1 Cap by mouth every day.   Dose:  18 mcg       trazodone 100 MG Tabs   Last time this was given:  100 mg on 4/8/2017  7:44 PM   Commonly known as:  DESYREL    Doctor's comments:  PLEASE REPLY   TAKE 3 TABLETS BY MOUTH NIGHTLY AT BEDTIME AS NEEDED FOR SLEEP               Instructions           Diet / Nutrition:    Follow any diet instructions given to you by your doctor or the dietician, including how much salt (sodium) you are allowed each day.    If you are overweight, talk to your doctor about a weight reduction plan.    Activity:    Remain physically active following your doctor's instructions about exercise and activity.    Rest often.     Any time you become even a little tired or short of breath, SIT DOWN and rest.    Worsening Symptoms:    Report any of the following signs and symptoms to the doctor's office immediately:    *Pain of jaw, arm, or neck  *Chest pain not relieved by medication                               *Dizziness or loss of consciousness  *Difficulty breathing even when at rest   *More tired than usual                                       *Bleeding drainage or swelling of surgical site  *Swelling of feet, ankles, legs or stomach                 *Fever (>100ºF)  *Pink or blood tinged sputum  *Weight gain (3lbs/day or 5lbs /week)           *Shock from internal defibrillator (if applicable)  *Palpitations or irregular heartbeats                *Cool and/or numb extremities    Stroke Awareness    Common Risk Factors for Stroke include:    Age  Atrial Fibrillation  Carotid Artery Stenosis  Diabetes Mellitus  Excessive alcohol consumption  High blood pressure  Overweight   Physical inactivity  Smoking    Warning signs and symptoms of a stroke include:    *Sudden numbness or weakness of the face, arm or leg (especially on one side of the body).  *Sudden confusion, trouble speaking or understanding.  *Sudden trouble seeing in one or both eyes.  *Sudden trouble walking,  dizziness, loss of balance or coordination.Sudden severe headache with no known cause.    It is very important to get treatment quickly when a stroke occurs. If you experience any of the above warning signs, call 911 immediately.                   Disclaimer         Quit Smoking / Tobacco Use:    I understand the use of any tobacco products increases my chance of suffering from future heart disease or stroke and could cause other illnesses which may shorten my life. Quitting the use of tobacco products is the single most important thing I can do to improve my health. For further information on smoking / tobacco cessation call a Toll Free Quit Line at 1-722.518.4638 (*[employer]) or 1-426.123.9524 ([employer]) or you can access the web based program at www.lungWorkFlowy.org.    [place] Tobacco Users Help Line:  (440) 214-2332       Toll Free: 1-957.729.5072  Quit Tobacco Program [facility] (054)264-0532    Crisis Hotline:    [place] Crisis Hotline:  5-701-DVMGGAG or 1-578.339.8252    [place] Crisis Hotline:    1-591.326.6921 or 286-916-8382    Discharge Survey:   Thank you for choosing [facility]. We hope we did everything we could to make your hospital stay a pleasant one. You may be receiving a phone survey and we would appreciate your time and participation in answering the questions. Your input is very valuable to us in our efforts to improve our service to our patients and their families.        My signature on this form indicates that:    1. I have reviewed and understand the above information.  2. My questions regarding this information have been answered to my satisfaction.  3. I have formulated a plan with my discharge nurse to obtain my prescribed medications for home.                  Disclaimer         __________________________________                     __________       ________                       Patient Signature                                                  Date                    Time

## 2017-04-01 NOTE — IP AVS SNAPSHOT
4/9/2017          Priya Callahan  [address] [place] [address]    Dear Priya:    [facility] wants to ensure your discharge home is safe and you or your loved ones have had all your questions answered regarding your care after you leave the hospital.    You may receive a telephone call within two days of your discharge.  This call is to make certain you understand your discharge instructions as well as ensure we provided you with the best care possible during your stay with us.     The call will only last approximately 3-5 minutes and will be done by a nurse.    Once again, we want to ensure your discharge home is safe and that you have a clear understanding of any next steps in your care.  If you have any questions or concerns, please do not hesitate to contact us, we are here for you.  Thank you for choosing [facility] for your healthcare needs.    Sincerely,    Arnaldo Roach    [facility]

## 2017-04-01 NOTE — ED PROVIDER NOTES
ER PROVIDER NOTE    Scribed for Isrrael Vela M.D.  by Ganesh Farrar. 4/1/2017 at 4:57 PM.    Primary Care Provider: Mickie Lawson M.D.  Means of Arrival: Ambulance   History obtained from: Nursing Staff   History limited by: patient's altered mental status.      CHIEF COMPLAINT  Chief Complaint   Patient presents with   • ALOC     pt lives alone and pt neighbors checked up on patient and called EMS, state patient was not acting normal   • Cough     recently 3/12 admitted for pneumonia   • Blurred Vision   • Neck Pain     can not bring chin to chest       HPI  Priya Callahan is a 66 y.o. female who presents to the emergency department for evaluation of fever and cough. Per nursing staff, the patient is from [place] the patient's neighbors checked on her and noted abnormal behavior. Nursing staff states the patient is experiencing associated neck pain. She also has a fever on exam. Patient also complains of associated back pain which is chronic in nature.  Denies abdominal pain, nausea vomiting or diarrhea. No chest pain. Does endorse some mild difficulty breathing. The patient was wearing a nasal cannula. Nursing staff adds the patient was admitted for pneumonia in early March. Patient uses 4L of oxygen at baseline.      The history is limited by the patient's altered mental status.     REVIEW OF SYSTEMS  Pertinent positives include confusion, fever, headache, blurred vision, cough, neck pain, back pain, expressive aphagia.  See HPI for details.     The ROS is limited by the patient's altered mental status.     PAST MEDICAL HISTORY   has a past medical history of COPD; ASTHMA; Hypertension; Pneumonia; Bronchitis; Unspecified urinary incontinence; Fall; Infectious disease; Arthritis; Fibromyalgia; Neuropathy (CMS-[facility]); Other specified symptom associated with female genital organs; Diabetes; Hepatitis C; Congestive heart failure (CMS-[facility]) (2013); Indigestion; GERD (gastroesophageal reflux disease);  Breath shortness; Sleep apnea; Disorder of thyroid; Backpain; Heart burn; Pain (9/13/2016); and Psychiatric problem (9/13/2016).    SURGICAL HISTORY   has past surgical history that includes gyn surgery; other orthopedic surgery; other orthopedic surgery; us-needle core bx-breast panel; lumpectomy (Left); inj dx/ther agnt paravert facet joint, bruce* (Left, 7/10/2015); inj dx/ther agnt paravert facet joint, bruce* (7/10/2015); inj dx/ther agnt paravert facet joint, bruce* (7/10/2015); inject nerv blck,othr periph nerv (7/10/2015); inj dx/ther agnt paravert facet joint, bruce* (Left, 7/17/2015); inj dx/ther agnt paravert facet joint, bruce* (7/17/2015); inj dx/ther agnt paravert facet joint, bruce* (7/17/2015); inject nerv blck,othr periph nerv (7/17/2015); dstr nrolytc agnt parverteb fct sngl lmbr/sacral (Left, 10/2/2015); dstr nrolytc agnt parverteb fct addl lmbr/sacral (10/2/2015); inject rx other periph nerve (10/2/2015); dstr nrolytc agnt parverteb fct addl lmbr/sacral (10/2/2015); dstr nrolytc agnt parverteb fct sngl lmbr/sacral (Right, 11/6/2015); dstr nrolytc agnt parverteb fct addl lmbr/sacral (11/6/2015); inject rx other periph nerve (11/6/2015); dstr nrolytc agnt parverteb fct addl lmbr/sacral (11/6/2015); dstr nrolytc agnt parverteb fct sngl lmbr/sacral (Left, 9/16/2016); dstr nrolytc agnt parverteb fct addl lmbr/sacral (9/16/2016); dstr nrolytc agnt parverteb fct addl lmbr/sacral (9/16/2016); and fluoroscopic guidance needle placement (9/16/2016).    FAMILY HISTORY  Family History   Problem Relation Age of Onset   • Lung Disease Mother    • Alcohol/Drug Mother    • Heart Disease Mother    • Cancer Father    • Cancer Brother    • Diabetes Maternal Grandmother    • Stroke Paternal Grandmother        SOCIAL HISTORY  Social History     Social History   • Marital Status:      Spouse Name: N/A   • Number of Children: N/A   • Years of Education: N/A     Social History Main Topics   • Smoking status: Former Smoker -- 1.00  packs/day for 50 years     Types: Cigarettes     Quit date: 02/01/2017   • Smokeless tobacco: Never Used   • Alcohol Use: No   • Drug Use: No   • Sexual Activity: Not on file     Other Topics Concern   • Not on file     Social History Narrative      History   Drug Use No       CURRENT MEDICATIONS  No current facility-administered medications on file prior to encounter.     Current Outpatient Prescriptions on File Prior to Encounter   Medication Sig Dispense Refill   • albuterol-ipratropium (COMBIVENT)  MCG/ACT Aerosol Inhale 2 Puffs by mouth every 6 hours as needed for Shortness of Breath. 1 Inhaler 3   • trazodone (DESYREL) 100 MG Tab TAKE 3 TABLETS BY MOUTH NIGHTLY AT BEDTIME AS NEEDED FOR SLEEP 90 Tab 1   • tiotropium (SPIRIVA) 18 MCG Cap Inhale 1 Cap by mouth every day. 30 Cap 0   • predniSONE (DELTASONE) 10 MG Tab PREDNISONE 60MG PO DAILY FOR THE 1ST 2DAYS  PREDNISONE 50MG PO DAILY FOR THE 2ND 2DAYS  PREDNISONE 40MG PO DAILY FOR THE 3RD 2DAYS  PREDNISONE 30MG PO DAILY FOR THE 4TH 2DAYS  PREDNISONE 20MG PO DAILY FOR THE 5TH 2DAYS  PREDNISONE 10MG PO DAILY FOR THE 6TH 2DAYS 42 Tab 0   • insulin glargine (LANTUS) 100 UNIT/ML Solution Inject 20 Units as instructed every evening.     • duloxetine (CYMBALTA) 60 MG Cap DR Particles delayed-release capsule Take 60 mg by mouth every day.     • levothyroxine (SYNTHROID) 75 MCG Tab TAKE 1 TABLET BY MOUTH DAILY 90 Tab 3   • oxycodone immediate release (ROXICODONE) 10 MG immediate release tablet Take 1-2 Tabs by mouth every 6 hours as needed for Moderate Pain (back pain and knee pain). 168 Tab 0   • fluticasone-salmeterol (ADVAIR) 500-50 MCG/DOSE AEROSOL POWDER, BREATH ACTIVATED Inhale 1 Puff by mouth every 12 hours. 1 Inhaler 5   • omeprazole (PRILOSEC) 20 MG delayed-release capsule Take 1 Cap by mouth every day. 90 Cap 1   • lisinopril (PRINIVIL, ZESTRIL) 40 MG tablet Take 1 Tab by mouth every day. 90 Tab 3   • cyclobenzaprine (FLEXERIL) 10 MG Tab TAKE 1 TABLET BY  "MOUTH THREE TIMES DAILY AS NEEDED FOR MILD PAIN 90 Tab 11   • lactulose 10 GM/15ML Solution Take 30 mL by mouth 2 times a day as needed (constipation). 500 mL 3   • simvastatin (ZOCOR) 10 MG Tab TAKE 1 TABLET BY MOUTH EVERY EVENING 90 Tab 3   • albuterol (PROVENTIL) 2.5mg/3ml Nebu Soln solution for nebulization 3 mL by Nebulization route every four hours as needed for Shortness of Breath. 75 mL 3   • escitalopram (LEXAPRO) 10 MG Tab Take 1 Tab by mouth every day. 90 Tab 3   • gabapentin (NEURONTIN) 600 MG tablet Take 1 Tab by mouth 3 times a day. 270 Tab 3   • montelukast (SINGULAIR) 10 MG Tab Take 1 Tab by mouth every day. 90 Tab 3      ALLERGIES  Allergies   Allergen Reactions   • Keflex Rash     Sever rash   • Codeine Nausea     Severe stomach pain   • Lyrica Nausea     Nausea, dizzy   • Sudafed Nausea and Unspecified     Chest pain, nausea       PHYSICAL EXAM  VITAL SIGNS: Pulse 70  Temp(Src) 38.1 °C (100.6 °F)  Resp 18  Ht 1.651 m (5' 5\")  Wt 95.255 kg (210 lb)  BMI 34.95 kg/m2  Pulse ox interpretation: Increased oxygen requirement from baseline.   Constitutional: Alert ill-appearing.  HENT: No signs of trauma, Bilateral external ears normal, Nose normal.   Eyes: Pupils are equal and reactive, Conjunctiva normal, Non-icteric.   Neck: Normal range of motion, No tenderness, Supple, No stridor.   Lymphatic: No lymphadenopathy noted.   Cardiovascular: Regular rate and rhythm, 4/6 systolic ejection murmur.   Thorax & Lungs: No wheezing, No chest tenderness. Rhonchi present throughout  Abdomen: Bowel sounds normal, Soft, No tenderness, No masses, No pulsatile masses. No peritoneal signs.  Skin: Warm, Dry, No erythema, No rash. Ecchymoses to bilateral anterior shins as well as forearms.   Back: No bony tenderness, No CVA tenderness.   Extremities: Intact distal pulses, No edema, No tenderness, No cyanosis, Negative Elin's sign.  Musculoskeletal: Good range of motion in all major joints. No tenderness to " palpation or major deformities noted.   Neurologic: Alert and oriented to person and place, but not date. Normal motor function, Normal sensory function, No focal deficits noted.   Psychiatric: Unable to evaluate    DIAGNOSTIC STUDIES / PROCEDURES    EKG  Interpreted by me    Rhythm:  Normal sinus rhythm   Rate: 65  Axis: normal  Intervals: normal  Ectopy: none  Conduction: normal  ST Segments: no acute change  T Waves: no acute change  Q Waves: none    LABS  Results for orders placed or performed during the hospital encounter of 04/01/17   Lactic acid (lactate)   Result Value Ref Range    Lactic Acid 0.8 0.5 - 2.0 mmol/L   CBC WITH DIFFERENTIAL   Result Value Ref Range    WBC 10.4 4.8 - 10.8 K/uL    RBC 2.92 (L) 4.20 - 5.40 M/uL    Hemoglobin 7.8 (L) 12.0 - 16.0 g/dL    Hematocrit 27.0 (L) 37.0 - 47.0 %    MCV 92.5 81.4 - 97.8 fL    MCH 26.7 (L) 27.0 - 33.0 pg    MCHC 28.9 (L) 33.6 - 35.0 g/dL    RDW 69.7 (H) 35.9 - 50.0 fL    Platelet Count 177 164 - 446 K/uL    MPV 9.2 9.0 - 12.9 fL    Neutrophils-Polys 80.90 (H) 44.00 - 72.00 %    Lymphocytes 8.80 (L) 22.00 - 41.00 %    Monocytes 8.90 0.00 - 13.40 %    Eosinophils 0.90 0.00 - 6.90 %    Basophils 0.10 0.00 - 1.80 %    Immature Granulocytes 0.40 0.00 - 0.90 %    Nucleated RBC 0.00 /100 WBC    Neutrophils (Absolute) 8.42 (H) 2.00 - 7.15 K/uL    Lymphs (Absolute) 0.91 (L) 1.00 - 4.80 K/uL    Monos (Absolute) 0.93 (H) 0.00 - 0.85 K/uL    Eos (Absolute) 0.09 0.00 - 0.51 K/uL    Baso (Absolute) 0.01 0.00 - 0.12 K/uL    Immature Granulocytes (abs) 0.04 0.00 - 0.11 K/uL    NRBC (Absolute) 0.00 K/uL    Hypochromia 1+     Anisocytosis 1+     Macrocytosis 1+     Microcytosis 1+    COMP METABOLIC PANEL   Result Value Ref Range    Sodium 135 135 - 145 mmol/L    Potassium 5.2 3.6 - 5.5 mmol/L    Chloride 99 96 - 112 mmol/L    Co2 30 20 - 33 mmol/L    Anion Gap 6.0 0.0 - 11.9    Glucose 101 (H) 65 - 99 mg/dL    Bun 34 (H) 8 - 22 mg/dL    Creatinine 1.62 (H) 0.50 - 1.40 mg/dL     Calcium 8.7 8.5 - 10.5 mg/dL    AST(SGOT) 13 12 - 45 U/L    ALT(SGPT) 15 2 - 50 U/L    Alkaline Phosphatase 53 30 - 99 U/L    Total Bilirubin 0.7 0.1 - 1.5 mg/dL    Albumin 3.2 3.2 - 4.9 g/dL    Total Protein 5.8 (L) 6.0 - 8.2 g/dL    Globulin 2.6 1.9 - 3.5 g/dL    A-G Ratio 1.2 g/dL   URINALYSIS   Result Value Ref Range    Micro Urine Req Microscopic     Color Yellow     Character Clear     Specific Gravity 1.009 <1.035    Ph 5.5 5.0-8.0    Glucose Negative Negative mg/dL    Ketones Negative Negative mg/dL    Protein 70 (A) Negative mg/dL    Bilirubin Negative Negative    Nitrite Negative Negative    Leukocyte Esterase Negative Negative    Occult Blood Negative Negative   Influenza Rapid   Result Value Ref Range    Significant Indicator NEG     Source RESP     Site Nasopharyngeal     Rapid Influenza A-B       Negative for Influenza A and Influenza B antigens.  Infection due to influenza A or B cannot be ruled out  since the antigen present in the specimen may be below the  detection limit of the test. Culture confirmation of  negative samples is recommended.     PERIPHERAL SMEAR REVIEW   Result Value Ref Range    Peripheral Smear Review see below    PLATELET ESTIMATE   Result Value Ref Range    Plt Estimation Normal    MORPHOLOGY   Result Value Ref Range    RBC Morphology Present     Polychromia 1+    DIFFERENTIAL COMMENT   Result Value Ref Range    Comments-Diff see below    ESTIMATED GFR   Result Value Ref Range    GFR If  38 (A) >60 mL/min/1.73 m 2    GFR If Non  32 (A) >60 mL/min/1.73 m 2   URINE MICROSCOPIC (W/UA)   Result Value Ref Range    Bacteria Few (A) None /hpf    Epithelial Cells Few /hpf    Mucous Threads Few /hpf    Amorphous Crystal Present /hpf   EKG (ER)   Result Value Ref Range    Report       [facility] Emergency [facility]    Test Date:  2017-04-01  Pt Name:    CHAITANYA CABELLO            Department: ER  MRN:        [id number]                       Room:       [facility]  Gender:     F                            Technician: [id number]  :        [id number]                   Requested By:KYLEE GARG  Order #:    [id number]                    Reading MD:    Measurements  Intervals                                Axis  Rate:       65                           P:          114  CA:         156                          QRS:        11  QRSD:       78                           T:          95  QT:         388  QTc:        404    Interpretive Statements  SINUS RHYTHM  PROBABLE LEFT ATRIAL ABNORMALITY  PROBABLE LVH WITH SECONDARY REPOL ABNRM  Compared to ECG 2017 18:00:01  No significant changes        All labs reviewed by me.    RADIOLOGY  DX-CHEST-LIMITED (1 VIEW)   Final Result      Interval placement of a right IJ central line without evidence of complication.      DX-CHEST-PORTABLE (1 VIEW)   Final Result      1.  Diffuse bilateral pulmonary infiltrates.      2.  Cardiomegaly.      CT-HEAD W/O   Final Result      No evidence of acute intracranial process.      LE VENOUS DUPLEX - DVT ([facility] Only)    (Results Pending)     The radiologist's interpretation of all radiological studies have been reviewed by me.    Central Line Placement Procedure Note  Indication: vascular access    Consent: The patient provided verbal consent for this procedure.    Procedure: The patient was positioned appropriately and the skin over the right internal jugular vein was prepped with betadine and draped in a sterile fashion. Local anesthesia was obtained by infiltration using 1% Lidocaine without epinephrine.  A large bore needle was used to identify the vein.  A guide wire was then inserted into the vein through the needle. A triple lumen catheter was then inserted into the vessel over the guide wire using the Seldinger technique.  All ports showed good, free flowing blood return and were flushed with saline solution.  The catheter was then securely fastened to the  "skin with sutures and covered with a sterile dressing.     The patient tolerated the procedure well.    Complications: None       COURSE & MEDICAL DECISION MAKING  Nursing notes, VS, PMSFHx reviewed in chart.    4:57 PM Patient seen and examined at bedside. Patient will be treated with 640 mg Tylenol, 2859 mL Bolus Infusion. Ordered for chest x-ray, head CT, lactic acid, influenza rapid, influenza by PCR, lactic acid, CBC with differential, CMP, UA, Urine culture, blood culture 2x to evaluate her symptoms. I discussed the treatment plan as above with the patient. She understood and verbalized agreement.     5:20 PM - Ordered 2400 mg in  mL, 2.5 g Zosyn to treat her symptoms.    5:42 PM - I was informed by nursing staff that the patient's vital signs have improved.   BP 80/37 mmHg  Pulse 70  Temp(Src) 38.1 °C (100.6 °F)  Resp 18  Ht 1.651 m (5' 5\")  Wt 95.255 kg (210 lb)  BMI 34.95 kg/m2     6:40 PM - Patient's family is now with the patient at bedside. I updated them on the patient's current condition. I notified them that the patient will be admitted to the hospital. They understood and verbalized agreement.     6:43 PM - Performed central line procedure. See above note.     6:59 PM - I discussed the patient's case and the above findings with Dr. Boyce (Hospitalist) who agrees to admit the patient under his care.      CRITICAL CARE  The very real possibility of a deterioration of this patient's condition required the highest level of my preparedness for sudden, emergent intervention.  I provided critical care services, which included medication orders, frequent reevaluations of the patient's condition and response to treatment, ordering and reviewing test results, and discussing the case with various consultants.  The critical care time associated with the care of the patient was 50 minutes. Review chart for interventions. This time is exclusive of any other billable procedures.      Decision " Making:  This is a 66 y.o. female presenting with fever, cough, as well as worsening hypoxemia. She does appear to have multifocal pneumonia as well as septic shock. Was given fluid challenge and started on pressors as above. I will cover her with Zosyn and vancomycin for healthcare associated pneumonia as she was recently admitted. Currently she has a stable although guarded respiratory status which may deteriorate given her history of CHF and her necessity for fluid bolusing with her sepsis. I think other sources for her fever, infection or less likely at this time given the nature of her symptoms and diagnostics performed here. She does have some mild confusion, although no headache. Was endorsing some neck pain but no meningeal  findings on my exam. Urinalysis is negative, and no skin findings either    DISPOSITION:  Patient will be admitted to Dr. Boyce in critical condition.     FINAL IMPRESSION  1. Septic shock (CMS-[facility])    2. Pneumonia of both lungs due to infectious organism, unspecified part of lung      Critical Care Time: 50 minutes     Ganesh CLAY (Scribe), am scribing for, and in the presence of, Isrrael Vela M.D..    Electronically signed by: Ganesh Farrar ([place]), 4/1/2017    IIsrrael M.D. personally performed the services described in this documentation, as scribed by Ganesh Farrar in my presence, and it is both accurate and complete.     The note accurately reflects work and decisions made by me.  Isrrael Vela  4/1/2017  9:06 PM

## 2017-04-01 NOTE — ED NOTES
Chief Complaint   Patient presents with   • ALOC     pt lives alone and pt neighbors checked up on patient and called EMS, state patient was not acting normal   • Cough     recently 3/12 admitted for pneumonia   • Blurred Vision   • Neck Pain     can not bring chin to chest     Pt BIB EMS from [place] from her home, pt neighbors checked up on the patient and said she isn't acting normal, pt A&Ox4 but states she feels confused.

## 2017-04-02 LAB
ALBUMIN SERPL BCP-MCNC: 2.8 G/DL (ref 3.2–4.9)
ALBUMIN/GLOB SERPL: 1 G/DL
ALP SERPL-CCNC: 57 U/L (ref 30–99)
ALT SERPL-CCNC: 17 U/L (ref 2–50)
ANION GAP SERPL CALC-SCNC: 9 MMOL/L (ref 0–11.9)
AST SERPL-CCNC: 20 U/L (ref 12–45)
BASOPHILS # BLD AUTO: 0.2 % (ref 0–1.8)
BASOPHILS # BLD: 0.04 K/UL (ref 0–0.12)
BILIRUB SERPL-MCNC: 1.7 MG/DL (ref 0.1–1.5)
BUN SERPL-MCNC: 27 MG/DL (ref 8–22)
CALCIUM SERPL-MCNC: 7.9 MG/DL (ref 8.5–10.5)
CHLORIDE SERPL-SCNC: 106 MMOL/L (ref 96–112)
CO2 SERPL-SCNC: 21 MMOL/L (ref 20–33)
CREAT SERPL-MCNC: 1.43 MG/DL (ref 0.5–1.4)
EOSINOPHIL # BLD AUTO: 0 K/UL (ref 0–0.51)
EOSINOPHIL NFR BLD: 0 % (ref 0–6.9)
ERYTHROCYTE [DISTWIDTH] IN BLOOD BY AUTOMATED COUNT: 68.7 FL (ref 35.9–50)
GFR SERPL CREATININE-BSD FRML MDRD: 37 ML/MIN/1.73 M 2
GLOBULIN SER CALC-MCNC: 2.7 G/DL (ref 1.9–3.5)
GLUCOSE BLD-MCNC: 285 MG/DL (ref 65–99)
GLUCOSE BLD-MCNC: 294 MG/DL (ref 65–99)
GLUCOSE BLD-MCNC: 332 MG/DL (ref 65–99)
GLUCOSE SERPL-MCNC: 301 MG/DL (ref 65–99)
GRAM STN SPEC: NORMAL
HCT VFR BLD AUTO: 28.6 % (ref 37–47)
HGB BLD-MCNC: 8.2 G/DL (ref 12–16)
IMM GRANULOCYTES # BLD AUTO: 0.13 K/UL (ref 0–0.11)
IMM GRANULOCYTES NFR BLD AUTO: 0.8 % (ref 0–0.9)
LYMPHOCYTES # BLD AUTO: 0.13 K/UL (ref 1–4.8)
LYMPHOCYTES NFR BLD: 0.8 % (ref 22–41)
MAGNESIUM SERPL-MCNC: 1.6 MG/DL (ref 1.5–2.5)
MCH RBC QN AUTO: 26.5 PG (ref 27–33)
MCHC RBC AUTO-ENTMCNC: 28.7 G/DL (ref 33.6–35)
MCV RBC AUTO: 92.3 FL (ref 81.4–97.8)
MONOCYTES # BLD AUTO: 0.26 K/UL (ref 0–0.85)
MONOCYTES NFR BLD AUTO: 1.6 % (ref 0–13.4)
NEUTROPHILS # BLD AUTO: 16.21 K/UL (ref 2–7.15)
NEUTROPHILS NFR BLD: 96.6 % (ref 44–72)
NRBC # BLD AUTO: 0 K/UL
NRBC BLD AUTO-RTO: 0 /100 WBC
PHOSPHATE SERPL-MCNC: 3.4 MG/DL (ref 2.5–4.5)
PLATELET # BLD AUTO: 166 K/UL (ref 164–446)
PMV BLD AUTO: 9.4 FL (ref 9–12.9)
POTASSIUM SERPL-SCNC: 5.2 MMOL/L (ref 3.6–5.5)
PROT SERPL-MCNC: 5.5 G/DL (ref 6–8.2)
RBC # BLD AUTO: 3.1 M/UL (ref 4.2–5.4)
SIGNIFICANT IND 70042: NORMAL
SITE SITE: NORMAL
SODIUM SERPL-SCNC: 136 MMOL/L (ref 135–145)
SOURCE SOURCE: NORMAL
WBC # BLD AUTO: 16.8 K/UL (ref 4.8–10.8)

## 2017-04-02 PROCEDURE — 700102 HCHG RX REV CODE 250 W/ 637 OVERRIDE(OP): Performed by: INTERNAL MEDICINE

## 2017-04-02 PROCEDURE — 82962 GLUCOSE BLOOD TEST: CPT | Mod: 91

## 2017-04-02 PROCEDURE — 87070 CULTURE OTHR SPECIMN AEROBIC: CPT

## 2017-04-02 PROCEDURE — 03HY32Z INSERTION OF MONITORING DEVICE INTO UPPER ARTERY, PERCUTANEOUS APPROACH: ICD-10-PCS | Performed by: INTERNAL MEDICINE

## 2017-04-02 PROCEDURE — 700102 HCHG RX REV CODE 250 W/ 637 OVERRIDE(OP): Performed by: HOSPITALIST

## 2017-04-02 PROCEDURE — 700105 HCHG RX REV CODE 258: Performed by: INTERNAL MEDICINE

## 2017-04-02 PROCEDURE — 700101 HCHG RX REV CODE 250: Performed by: INTERNAL MEDICINE

## 2017-04-02 PROCEDURE — A9270 NON-COVERED ITEM OR SERVICE: HCPCS | Performed by: INTERNAL MEDICINE

## 2017-04-02 PROCEDURE — 83735 ASSAY OF MAGNESIUM: CPT

## 2017-04-02 PROCEDURE — 700111 HCHG RX REV CODE 636 W/ 250 OVERRIDE (IP)

## 2017-04-02 PROCEDURE — 84100 ASSAY OF PHOSPHORUS: CPT

## 2017-04-02 PROCEDURE — 770022 HCHG ROOM/CARE - ICU (200)

## 2017-04-02 PROCEDURE — 94640 AIRWAY INHALATION TREATMENT: CPT

## 2017-04-02 PROCEDURE — 700105 HCHG RX REV CODE 258

## 2017-04-02 PROCEDURE — 87205 SMEAR GRAM STAIN: CPT

## 2017-04-02 PROCEDURE — 80053 COMPREHEN METABOLIC PANEL: CPT

## 2017-04-02 PROCEDURE — 99233 SBSQ HOSP IP/OBS HIGH 50: CPT | Performed by: HOSPITALIST

## 2017-04-02 PROCEDURE — 700111 HCHG RX REV CODE 636 W/ 250 OVERRIDE (IP): Performed by: INTERNAL MEDICINE

## 2017-04-02 PROCEDURE — A9270 NON-COVERED ITEM OR SERVICE: HCPCS | Performed by: HOSPITALIST

## 2017-04-02 PROCEDURE — 85025 COMPLETE CBC W/AUTO DIFF WBC: CPT

## 2017-04-02 RX ORDER — GABAPENTIN 300 MG/1
600 CAPSULE ORAL 3 TIMES DAILY
Status: DISCONTINUED | OUTPATIENT
Start: 2017-04-02 | End: 2017-04-09 | Stop reason: HOSPADM

## 2017-04-02 RX ORDER — TRAZODONE HYDROCHLORIDE 50 MG/1
100 TABLET ORAL
Status: DISCONTINUED | OUTPATIENT
Start: 2017-04-02 | End: 2017-04-09 | Stop reason: HOSPADM

## 2017-04-02 RX ORDER — TIOTROPIUM BROMIDE 18 UG/1
1 CAPSULE ORAL; RESPIRATORY (INHALATION) DAILY
Status: DISCONTINUED | OUTPATIENT
Start: 2017-04-03 | End: 2017-04-09 | Stop reason: HOSPADM

## 2017-04-02 RX ORDER — OXYCODONE HYDROCHLORIDE 10 MG/1
10 TABLET ORAL EVERY 6 HOURS PRN
Status: DISCONTINUED | OUTPATIENT
Start: 2017-04-02 | End: 2017-04-09 | Stop reason: HOSPADM

## 2017-04-02 RX ORDER — CYCLOBENZAPRINE HCL 10 MG
10 TABLET ORAL 3 TIMES DAILY PRN
Status: DISCONTINUED | OUTPATIENT
Start: 2017-04-02 | End: 2017-04-09 | Stop reason: HOSPADM

## 2017-04-02 RX ORDER — OXYCODONE HYDROCHLORIDE 5 MG/1
5 TABLET ORAL EVERY 6 HOURS PRN
Status: DISCONTINUED | OUTPATIENT
Start: 2017-04-02 | End: 2017-04-09 | Stop reason: HOSPADM

## 2017-04-02 RX ADMIN — IPRATROPIUM BROMIDE AND ALBUTEROL SULFATE 3 ML: .5; 3 SOLUTION RESPIRATORY (INHALATION) at 19:46

## 2017-04-02 RX ADMIN — PIPERACILLIN AND TAZOBACTAM 4.5 G: 4; .5 INJECTION, POWDER, LYOPHILIZED, FOR SOLUTION INTRAVENOUS; PARENTERAL at 20:07

## 2017-04-02 RX ADMIN — VANCOMYCIN HYDROCHLORIDE 1500 MG: 100 INJECTION, POWDER, LYOPHILIZED, FOR SOLUTION INTRAVENOUS at 19:16

## 2017-04-02 RX ADMIN — IPRATROPIUM BROMIDE AND ALBUTEROL SULFATE 3 ML: .5; 3 SOLUTION RESPIRATORY (INHALATION) at 03:25

## 2017-04-02 RX ADMIN — METHYLPREDNISOLONE SODIUM SUCCINATE 62.5 MG: 125 INJECTION, POWDER, FOR SOLUTION INTRAMUSCULAR; INTRAVENOUS at 06:00

## 2017-04-02 RX ADMIN — ESCITALOPRAM OXALATE 10 MG: 10 TABLET ORAL at 09:06

## 2017-04-02 RX ADMIN — DULOXETINE HYDROCHLORIDE 30 MG: 30 CAPSULE, DELAYED RELEASE ORAL at 09:06

## 2017-04-02 RX ADMIN — FAMOTIDINE 20 MG: 20 TABLET, FILM COATED ORAL at 09:06

## 2017-04-02 RX ADMIN — PIPERACILLIN AND TAZOBACTAM 4.5 G: 4; .5 INJECTION, POWDER, LYOPHILIZED, FOR SOLUTION INTRAVENOUS; PARENTERAL at 23:40

## 2017-04-02 RX ADMIN — OXYCODONE HYDROCHLORIDE 5 MG: 5 TABLET ORAL at 20:06

## 2017-04-02 RX ADMIN — TIOTROPIUM BROMIDE 1 CAPSULE: 18 CAPSULE ORAL; RESPIRATORY (INHALATION) at 08:08

## 2017-04-02 RX ADMIN — HEPARIN SODIUM 5000 UNITS: 5000 INJECTION, SOLUTION INTRAVENOUS; SUBCUTANEOUS at 20:06

## 2017-04-02 RX ADMIN — PIPERACILLIN AND TAZOBACTAM 4.5 G: 4; .5 INJECTION, POWDER, LYOPHILIZED, FOR SOLUTION INTRAVENOUS; PARENTERAL at 12:43

## 2017-04-02 RX ADMIN — GABAPENTIN 600 MG: 300 CAPSULE ORAL at 20:06

## 2017-04-02 RX ADMIN — LEVOTHYROXINE SODIUM 75 MCG: 75 TABLET ORAL at 07:29

## 2017-04-02 RX ADMIN — CIPROFLOXACIN 400 MG: 2 INJECTION, SOLUTION INTRAVENOUS at 11:07

## 2017-04-02 RX ADMIN — NOREPINEPHRINE BITARTRATE 20 MCG/MIN: 1 INJECTION INTRAVENOUS at 00:27

## 2017-04-02 RX ADMIN — TRAZODONE HYDROCHLORIDE 100 MG: 50 TABLET ORAL at 20:49

## 2017-04-02 RX ADMIN — SIMVASTATIN 10 MG: 20 TABLET, FILM COATED ORAL at 20:06

## 2017-04-02 RX ADMIN — MONTELUKAST SODIUM 10 MG: 10 TABLET, FILM COATED ORAL at 09:06

## 2017-04-02 RX ADMIN — METHYLPREDNISOLONE SODIUM SUCCINATE 62.5 MG: 125 INJECTION, POWDER, FOR SOLUTION INTRAMUSCULAR; INTRAVENOUS at 11:08

## 2017-04-02 RX ADMIN — BUDESONIDE AND FORMOTEROL FUMARATE DIHYDRATE 2 PUFF: 160; 4.5 AEROSOL RESPIRATORY (INHALATION) at 19:46

## 2017-04-02 RX ADMIN — HEPARIN SODIUM 5000 UNITS: 5000 INJECTION, SOLUTION INTRAVENOUS; SUBCUTANEOUS at 06:00

## 2017-04-02 RX ADMIN — CYCLOBENZAPRINE HYDROCHLORIDE 10 MG: 10 TABLET, FILM COATED ORAL at 20:49

## 2017-04-02 RX ADMIN — IPRATROPIUM BROMIDE AND ALBUTEROL SULFATE 3 ML: .5; 3 SOLUTION RESPIRATORY (INHALATION) at 12:50

## 2017-04-02 RX ADMIN — BUDESONIDE AND FORMOTEROL FUMARATE DIHYDRATE 2 PUFF: 160; 4.5 AEROSOL RESPIRATORY (INHALATION) at 08:08

## 2017-04-02 RX ADMIN — SODIUM CHLORIDE: 9 INJECTION, SOLUTION INTRAVENOUS at 11:12

## 2017-04-02 RX ADMIN — GABAPENTIN 600 MG: 300 CAPSULE ORAL at 11:08

## 2017-04-02 RX ADMIN — PIPERACILLIN AND TAZOBACTAM 4.5 G: 4; .5 INJECTION, POWDER, LYOPHILIZED, FOR SOLUTION INTRAVENOUS; PARENTERAL at 06:00

## 2017-04-02 RX ADMIN — OXYCODONE HYDROCHLORIDE 5 MG: 5 TABLET ORAL at 20:18

## 2017-04-02 RX ADMIN — METHYLPREDNISOLONE SODIUM SUCCINATE 62.5 MG: 125 INJECTION, POWDER, FOR SOLUTION INTRAMUSCULAR; INTRAVENOUS at 19:19

## 2017-04-02 RX ADMIN — OXYCODONE HYDROCHLORIDE 10 MG: 10 TABLET ORAL at 11:08

## 2017-04-02 RX ADMIN — CIPROFLOXACIN 400 MG: 2 INJECTION, SOLUTION INTRAVENOUS at 21:21

## 2017-04-02 RX ADMIN — IPRATROPIUM BROMIDE AND ALBUTEROL SULFATE 3 ML: .5; 3 SOLUTION RESPIRATORY (INHALATION) at 08:07

## 2017-04-02 RX ADMIN — ACETAMINOPHEN 650 MG: 325 TABLET, FILM COATED ORAL at 09:06

## 2017-04-02 ASSESSMENT — PAIN SCALES - GENERAL
PAINLEVEL_OUTOF10: 7
PAINLEVEL_OUTOF10: 2
PAINLEVEL_OUTOF10: 8
PAINLEVEL_OUTOF10: 5
PAINLEVEL_OUTOF10: 5
PAINLEVEL_OUTOF10: 2
PAINLEVEL_OUTOF10: 4
PAINLEVEL_OUTOF10: 5
PAINLEVEL_OUTOF10: 2

## 2017-04-02 ASSESSMENT — ENCOUNTER SYMPTOMS
SHORTNESS OF BREATH: 1
WEAKNESS: 1
DEPRESSION: 0
COUGH: 1
SPEECH CHANGE: 0
NERVOUS/ANXIOUS: 0
STRIDOR: 0
CHILLS: 0
WHEEZING: 0
PALPITATIONS: 0
ABDOMINAL PAIN: 0
DIZZINESS: 0
NAUSEA: 0
BACK PAIN: 0
HEADACHES: 0
SPUTUM PRODUCTION: 0
FEVER: 0
DIARRHEA: 0
EYE DISCHARGE: 0

## 2017-04-02 NOTE — ED NOTES
Pt in room at this time.  Oriented to place at this time and self.  Able to recognize friends in room but cannot recall events.

## 2017-04-02 NOTE — PROCEDURES
BEDSIDE ARTERIAL LINE PLACEMENT NOTE    PROCEDURE DATE: 04/02/2017   PROCEDURE START TIME: 0000    PRIMARY PROCEDURALIST: Norman Boyce  ASSISTANT(S): None      INFORMED CONSENT: Emergent    UNIVERSAL PROTOCOL / SAFETY CHECKLIST  Sign in Communication: Completed    Time Out:  Team Confirms the Correct Patient, Correct Procedure, Correct Site and Site Marking, Correct Position (if applicable), Prep and Dry Time (if applicable).  Time:  2357    Affirmation of Time Out: YES      Sign Out Discussion: Completed    PROCEDURE: ARTERIAL CATHETER INSERTION  Line/Indication: Single lumen for Monitoring of vital bodily functions (BP, pH, paO2, paCO2)  and Frequent ABGs & Labs.  Anesthesia/Sedation: Local; lidocaine 1%  Insertion Site: L radialArea Prep: Skin prep with chlorhexidine gluconate. The usual aseptic technique & maximal barrier precautions were used.    Technique Used to Place Line: Modified Seldinger  Ultrasound Used for Insertion: Yes    Modified Pawel’s test was completed and positive prior to vessel cannulation. The vessel was cannulated under direct ultrasound visualization  with a 20 gauge catheter on the second attempt. A straight-tipped spring wire was passed into the artery through the indwelling catheter and left in situ while the catheter was advanced. The catheter was left in situ while the guidewire was removed.      Pulsatile blood flow exited the catheter and an arterial waveform was noted on the monitor when the catheter was transduced. The catheter was secured in place with sterile sutures. A sterile dressing was applied and dated.     All catheters, needles and/or wires were accounted for and intact: Yes.     Patient tolerated procedure well.    Complications: None    No Specimens Collected Unless Noted Here    Estimated Blood Loss: None    Norman Boyce  04/02/2017  12:46 AM

## 2017-04-02 NOTE — PROGRESS NOTES
"Pharmacy Kinetics 66 y.o. female on vancomycin day # 2 2017    Currently on Vancomycin 1000 mg iv q12hr    Indication for Treatment: HCAP    Pertinent history per medical record: Admitted on 2017 for ALOC; per report, the patients neighbor found her down.  Patient was found to be febrile upon arrival to the ED. She was admitted to the ICU 3/8-3/17 for sepsis secondary to CAP and COPD exacerbation, and wears 4LPM NC at baseline.     Other antibiotics: Zosyn 4.5 gm iv Q6H, Cipro 400 mg IV Q12H    Allergies: Keflex; Codeine; Lyrica; and Sudafed     List concerns for renal function: elevated SCr, obese (BMI ~ 37), low albumin, SIRS, DM II, HAN vs CKD    Pertinent cultures to date:   17 blood, peripheral: NGTD x 2  17nares: negative for influenza     Recent Labs      17   1654  17   0300   WBC  10.4  16.8*   NEUTSPOLYS  80.90*  96.60*     Recent Labs      17   1654  17   0300   BUN  34*  27*   CREATININE  1.62*  1.43*   ALBUMIN  3.2  2.8*     No results for input(s): VANCOTROUGH, VANCOPEAK, VANCORANDOM in the last 72 hours.  Intake/Output Summary (Last 24 hours) at 17 1327  Last data filed at 17 0600   Gross per 24 hour   Intake 1709.57 ml   Output   2100 ml   Net -390.43 ml      Blood pressure 82/40, pulse 67, temperature 37.2 °C (99 °F), resp. rate 17, height 1.664 m (5' 5.5\"), weight 102.6 kg (226 lb 3.1 oz), SpO2 99 %, not currently breastfeeding. Temp (24hrs), Av.7 °C (99.8 °F), Min:37.2 °C (99 °F), Max:38.1 °C (100.6 °F)      A/P   1. Vancomycin dose change: The first maintenance dose was scheduled for 0500 on 4/3.  I have ordered a 15 mg/kg vancomycin dose STAT and will continue this dose Q12H x 2 with a level 12 hours after that.  2. Next vancomycin level: 4/3 to be scheduled 12 hours after the second 1500 mg dose is administered  3. Goal trough: 16-20 mcg/mL  4. Comments: I have changed vancomycin to 1500 mg iv now to be followed by a second dose 12 hours " later.  This patient has an elevated SCr, but it is improving and she had 2.1 L of urine output over night.  I am anticipating that she may need a Q24H dose.    Liana Caldera, KhoiD.

## 2017-04-02 NOTE — ED NOTES
Med rec updated and complete  Allergies reviewed but not verified.  Pt is not able to state last doses taken.  No prescription bottles at bedside.

## 2017-04-02 NOTE — H&P
PRIMARY CARE PHYSICIAN:  Mickie Lawson MD    CHIEF COMPLAINT:  Found unresponsive by friends.    HISTORY OF PRESENTING ILLNESS:  This is a 66-year-old female who has been   brought to the emergency room after she was found down by her friends.  Upon   evaluation by me, the patient informed me that she is at the [facility],   but she is unaware of the month or year.  She does tell me that Nile Stroud   is the president of [place].  She does engage in the history of   presenting illness, but is easily distracted.  Upon review of electronic   medical record system, it appears that patient was admitted recently to the   hospital on March 8th, 2017, when she was admitted in critical condition to   the intensive care unit with septic shock secondary to community-acquired   pneumonia coupled with COPD exacerbation.  She was discharged from the   hospital on the March 17.  At that point in time, some functional deficits   were noted and post-acute placement was recommended, but the patient declined   this and wanted to go home.  I am uncertain if patient established outpatient   followup after her discharge.  The patient was visited by her friends today   who found the patient unresponsive on the floor being confused and   subsequently, the patient was brought into the emergency room for further   evaluation.  Upon evaluation by me, the patient is coughing extensively.  She   denies having any headaches.  When questioned by me if she was having any neck   pain, she declined this, though neck pain was reported as a chief complaint   upon presentation of this patient to the emergency room.  She denies having   any neck stiffness.  She denies any problems with range of motion of her neck.    She denies any photophobia.  The patient does complain of cough which is   productive of sputum.  When questioned regarding the color of the sputum or   any blood in the sputum, she is unable to answer this.  She reports that  she   does use oxygen at home.  When questioned regarding the amount of oxygen she   uses, again she is unable to answer this.  Otherwise, the patient denies   having any chest pain.  She denies any abdominal pain.  She denies any   diarrhea, constipation, blood in bowel movements or melena.  She denies any   hematemesis or hemoptysis as such.  She denies any dysuria, frequency,   urgency, or hematuria.  The patient denies any lower extremity swelling.  She   denies any orthopnea or paroxysmal nocturnal dyspnea.    HOME MEDICATIONS:  1.  Combivent inhaler as needed.  2.  Trazodone 100 mg at bedtime.  3.  Spiriva 18 mcg inhalation daily.  4.  Prednisone taper.  5.  Insulin Lantus 20 units daily.  6.  Duloxetine 60 mg daily.  7.  Synthroid 75 mcg daily.  8.  Oxycodone 10-20 mg every 6 hours as needed.  9.  Advair Diskus 1 puff twice a day.  10.  Omeprazole 20 mg daily.  11.  Lisinopril 40 mg daily.  12.  Flexeril 10 mg 3 times a day as needed.  13.  Lactulose as needed for constipation.  14.  Simvastatin 10 mg daily.  15.  Albuterol nebulization as needed for shortness of breath.  16.  Lexapro 10 mg daily.  17.  Gabapentin 600 mg 3 times a day.  18.  Singulair 10 mg daily.    PAST MEDICAL HISTORY:  1.  Chronic obstructive pulmonary disease/asthma.  2.  Chronic hypoxemic respiratory failure.  3.  Spinal stenosis.  4.  Fibromyalgia.  5.  Hypertension.  6.  Obstructive sleep apnea.  7.  Neuropathy.  8.  Diabetes mellitus type 2.  9.  Diastolic heart failure.  10.  Gastroesophageal reflux disease.  11.  Hypothyroidism.  12.  Insomnia.  13.  Depression.  14.  Hyperlipidemia.  15.  Chronic pain syndrome with narcotic dependence.  16.  Recent hospitalization for septic shock secondary to community-acquired   pneumonia.    PAST SURGICAL HISTORY:  1.  Hysterectomy.  2.  Left knee replacement.  3.  Lumpectomy.    FAMILY HISTORY:  Unable to obtain from the patient given the patient is not   willing to comply with this part of  history of presenting illness.  Upon   review of electronic medical record system it appears that her mother had a   history of heart disease, alcoholism, and chronic obstructive pulmonary   disease.    SOCIAL HISTORY:  The patient is an ex-smoker who gave up smoking prior to her   last hospitalization.  She is unable to tell me if she restarted smoking after   her discharge from the hospital.  Prior to that, she _____ 50 pack years of   smoking history.  Otherwise, no history of alcohol or illicit drug abuse is   noted.  She is currently living independently by herself and I am unaware of   the underlying social situation or if any family is available.  I discussed   this with the patient's friend at bedside and there remains unaware also.    ALLERGIES:  EMR REPORTS THAT THE PATIENT IS ALLERGIC TO KEFLEX, CODEINE,   LYRICA, AND SUDAFED.    REVIEW OF SYSTEMS:  A detailed review of system was reviewed with the patient   and negative other than as listed in the history of presenting illness and   past medical history.    PHYSICAL EXAMINATION:  VITAL SIGNS:  On presentation, temperature 38.1 degrees Celsius, pulse of 70,   respiratory rate of 18, blood pressure 80/37, weight of 95.2 kilograms.    Oxygen saturation of 99% on 6 liters of nasal cannula.  GENERAL:  The patient is alert to being in [facility].  She is unaware of   the month or the year, is unaware who the president is and is able to be   engaged in a productive conversation.  She overall appears to be slightly   anxious and agitated.  HEAD, EYES, AND ENT:  Head is normocephalic.  Extraocular movements are   intact.  Bilateral pupils are equal and reactive to light.  Conjunctival   pallor is noted.  There is no scleral icterus.  No nasal discharge is noted.    Moist mucous membranes are noted.  NECK:  There is no jugular venous distension.  CARDIOVASCULAR:  Regular rate and rhythm.  S1 plus S2.  No murmurs, rubs, or   gallops noted by me on  auscultation.  RESPIRATIONS:  The patient is noted to have overall globally diminished breath   sounds.  She is noted to have extensive expiratory wheezing.  She is noted to   have rhonchi extensive in all lung fields.  ABDOMEN:  Soft, nontender, nondistended.  Bowel sounds positive and normal in   frequency.  Striations are noted on the pannus.  Abdominal skin ecchymosis is   noted.  GENITOURINARY:  Not examined.  MUSCULOSKELETAL:  No joint swelling or tenderness on examination.  SKIN:  Ecchymosis of the abdominal pannus and ecchymosis noted on bilateral   shins.  NEUROLOGICAL:  Cranial nerves are without any gross deficits bilaterally.    Pupils are equal and reactive to light.  She is following commands and moving   all extremities.  EXTREMITIES:  Pulses 1+ in bilateral lower extremity, cold and clammy.  No   cyanosis.    LABORATORY STUDIES:  White blood cell count of 10.4, hemoglobin of 7.8,   platelet count of 177.  Sodium of 135, potassium of 5.2, BUN of 34, creatinine   of 1.62.  Liver function tests within normal limits.  Lactic acid of 0.8.    Urinalysis negative for any evidence of infection.  Influenza screening was   negative.    IMAGING STUDIES:  1.  A noncontrasted CT of the head obtained on presentation to the emergency   room reveals no evidence of acute intracranial process.  2.  Chest x-ray obtained reveals diffuse bilateral pulmonary infiltrates   consistent with multifocal pneumonia.  3.  EKG obtained today and personally reviewed by me reveals the patient to be   in sinus rhythm.  Findings are concerning for early repolarization   abnormality which is pretty much consistent with her prior EKG from the March 7th, 2017.  Otherwise, there are no new acute ST-T wave changes which would be   concerning for ischemia.    ASSESSMENT AND PLAN:  1.  Septic shock.  The patient's presentation is consistent with septic shock   given her ongoing hypotension despite aggressive IV fluid hydration.  Source    for her septic shock is presumed healthcare-associated pneumonia.  At this   point in time, the patient will be admitted in critical condition to the   intensive care unit.  She has already received sepsis protocol, IV fluid bolus   in the emergency room followed by another 1 liter bolus.  End organ   dysfunction is already manifested by underlying acute kidney injury and acute   encephalopathy, which is related to her septic shock.  At this point in time,   I would recommend ongoing IV fluid hydration.  The patient will be admitted   with sepsis protocol with ongoing monitoring of her lactic acid levels.    Vasopressors have been initiated to maintain end organ perfusion and to   maintain mean arterial pressures greater than 65.  Broad spectrum antibiotics   have been initiated.  She will be monitored closely in the intensive care unit   and hemodynamic parameters will be stabilized to maintain end organ   perfusion.  In addition, we will check a central venous pressure and a venous   blood gas to assess adequacy of IV fluid hydration in this patient.  2.  Healthcare-associated pneumonia given recent hospitalization.    Presentation is consistent with multifocal pneumonia.  Etiology is unknown at   this point in time.  At this point in time, the patient will be initiated on   empiric antibiotic therapy including the use of intravenous vancomycin, Zosyn   and double coverage with intravenous ciprofloxacin for gram-negative   etiologies and possible atypical etiologies.  I would obtain sputum cultures,   blood cultures, and nares MRSA swabs.  Antibiotics should be deescalated as   clinically appropriate.  3.  Acute on chronic hypoxemic respiratory failure.  This is multifactorial in   etiology contributed by her underlying healthcare-associated pneumonia, COPD   exacerbation and septic shock with end organ dysfunction.  Recommend ongoing   close monitoring in the intensive care unit.  This patient is at very high    risk of de-escalation and emergent intubation.  Recommend obtaining an   arterial blood gas to assess the level of hypoxemia/hypercapnia if any is   evident.  In addition, recommend screening with a DVT duplex study emergently   to rule out any evidence of deep venous thrombosis.  If a DVT study is   negative, then recommend obtaining a contrasted CT to rule out pulmonary   embolism.  If DVT study is positive, then a noncontrasted study can be   obtained for further evaluation of the pulmonary parenchyma.  4.  Acute kidney injury on chronic kidney disease stage II secondary to   underlying shock with end organ dysfunction.  Assess response to IV fluid   hydration, maintain end organ perfusion and avoid nephrotoxins while   monitoring renal function and dosing medications renally.  5.  Acute chronic obstructive pulmonary disease exacerbation.  Recommend   initiation of intravenous steroids coupled with aggressive bronchodilator   regimen coupled with ongoing IV antibiotic therapy, which should be   deescalated as clinically appropriate.  Uncertain regarding her baseline   status with respect to nicotine dependence.  Once her encephalopathy improves,   if she has ongoing nicotine dependence, she should be further counseled and   educated regarding nicotine cessation.  6.  Acute encephalopathy, which is secondary to shock.  Recommend ongoing   monitoring.  Upon evaluation by me, this patient does not have any neck   stiffness.  She declines any photophobia.  Brudzinski's and Kernig's signs are   negative.  At this point in time, I have a very low suspicion for meningitis   as an etiology of her presentation.  If her encephalopathy fails to improve   over her hospital course, then we would recommend evaluation for meningitis.  7.  Chronic anemia.  Recommend monitoring of her hemoglobin.  Obtaining detail   anemia evaluation once her acute issues resolve.  Meanwhile, recommend   practicing restrictive transfusion  strategy.  8.  Chronic kidney disease stage II.  Monitor renal function.  Avoid   nephrotoxin and dose medications renally.  9.  Hypertension with holding all antihypertensive therapy given presentation   with septic shock, currently requiring vasopressor therapy.  10.  Diabetes mellitus type 2 with manifestations of neuropathy and   nephropathy.  Recommend continuation of sliding scale insulin therapy,   withholding her long acting Lantus given uncertain regarding her oral intake   at this point in time.  This should be resumed as the patient resumes normal   eating.  11.  Chronic diastolic dysfunction, stage II.  Continue euvolemia and   hypertension control.  12.  Depression.  Given her acute kidney injury, I have reduced her dose of   duloxetine to 30 mg daily.  This can be resumed to her baseline dose once her   renal dysfunction and encephalopathy is better, otherwise we will continue her   other baseline regimen.  13.  Neuropathy.  Gabapentin dosage has been decreased in the setting of acute   encephalopathy and acute kidney injury.  Recommend resuming baseline dosage   once these improve.  14.  Hyperlipidemia.  Continue simvastatin.  15.  Gastroesophageal reflux disease given acute hospitalization with use of   extensive empiric antibiotic therapy.  Recommend transition to Pepcid therapy   instead of PPI to limit risk of seizures.  16.  Chronic pain syndrome with narcotic dependence, withholding her narcotics   given presentation with encephalopathy and limiting gabapentin for now.    Baseline regimen should be resumed once her encephalopathy improves.  17.  Hypothyroidism.  Continue Synthroid.  18.  Insomnia, withholding trazodone given presentation with encephalopathy.  19.  Preventive prophylaxis.  DVT preventive prophylaxis with sequential   compression devices and subcutaneous heparin has been ordered.  Stress ulcer   prophylaxis have been ordered with Pepcid given use of high-dose Solu-Medrol   usage.   Obesity with a body mass index of 34.95.  20.  Code status:  The patient is unable to be engaged in a productive   conversation regarding her code status at this point in time.  She is admitted   to the hospital under a full code status.    This patient has critical/life threatening illness as detailed above requiring   my direct presence, involvement and maximal preparedness.  I have personally   spent 45 minutes providing critical care to this patient.    Time spent on critical care excludes time spent on procedures if any, which   has been billed separately.       ____________________________________     MD CYNTHIA BARRIOS / NTS    DD:  04/01/2017 20:43:43  DT:  04/01/2017 22:30:45    D#:  [id number]  Job#:  [id number]

## 2017-04-02 NOTE — PROGRESS NOTES
"Pharmacy Kinetics 66 y.o. female on vancomycin day # 1 2017    Vancomycin New Start    2400 mg iv loading dose administered @ 1810      Indication for Treatment:   HCAP    Pertinent history per medical record:   Admitted on 2017 for ALOC; per report, pt's neighbors checked in on her and noticed odd behavior.  Patient was found to be febrile upon arrival to the ED. She was admitted 3/8-3/17 for CAP and COPD exacerbation, and wears 4LPM NC at baseline. Empiric abx have been initiated.     Imaging studies:   CXR: Diffuse bilateral pulmonary infiltrates.    Other antibiotics:   Cipro  mg q12h  Zosyn 4.5 gm IV q6h     Allergies:  Keflex; Codeine; Lyrica; and Sudafed     List concerns for renal function:   Acutely elevated BUN/SCr suggesting HAN, hypermetabolic, hypotensive requiring vasopressor support, CHF    Pertinent cultures to date:   In process     Recent Labs      17   1654   WBC  10.4   NEUTSPOLYS  80.90*     Recent Labs      17   1654   BUN  34*   CREATININE  1.62*   ALBUMIN  3.2     Blood pressure 82/40, pulse 84, temperature 38.1 °C (100.6 °F), resp. rate 22, height 1.651 m (5' 5\"), weight 95.255 kg (210 lb), SpO2 100 %. Temp (24hrs), Av.1 °C (100.6 °F), Min:38.1 °C (100.6 °F), Max:38.1 °C (100.6 °F)      A/P   1. Vancomycin dose change: initiate 1000 mg iv q12h ()  2. Next vancomycin level: 4/3 @ 1600 (ordered)   3. Goal trough: 16-20  4. Comments: Patient with significant risk for drug accumulation and associated nephrotoxicity. UOP over last several hours has been consistently >100 mL/hr per [facility] RN.  Will initiate cautious q12h dosing regimen and obtain trough prior to the third total dose.  Would recommend de-escalation of abx if MRSA can be ruled out. Pharmacy will continue to monitor and adjust or de-escalate as appropriate.      Valdez Mccabe PharmROBY        "

## 2017-04-02 NOTE — PROGRESS NOTES
"I examined the patient 4/1/2017 7:30 PM  Vital Signs:BP 82/40 mmHg  Pulse 84  Temp(Src) 38.1 °C (100.6 °F)  Resp 22  Ht 1.651 m (5' 5\")  Wt 95.255 kg (210 lb)  BMI 34.95 kg/m2  SpO2 100%  Cardiac examination significant for Regular rate and rhythm  Pulmonary examination significant for Wheezing and Extensive rhonchi  Capillary refill is slowed  Peripheral Pulse is 1+   Skin is pale and cold / clammy.     Norman Boyce M.D.    "

## 2017-04-03 ENCOUNTER — HOSPITAL ENCOUNTER (OUTPATIENT)
Facility: MEDICAL CENTER | Age: 67
End: 2017-04-03
Attending: FAMILY MEDICINE
Payer: MEDICARE

## 2017-04-03 LAB
BACTERIA UR CULT: NORMAL
EKG IMPRESSION: NORMAL
GLUCOSE BLD-MCNC: 269 MG/DL (ref 65–99)
GLUCOSE BLD-MCNC: 292 MG/DL (ref 65–99)
GLUCOSE BLD-MCNC: 332 MG/DL (ref 65–99)
GLUCOSE BLD-MCNC: 347 MG/DL (ref 65–99)
SIGNIFICANT IND 70042: NORMAL
SITE SITE: NORMAL
SOURCE SOURCE: NORMAL

## 2017-04-03 PROCEDURE — 770006 HCHG ROOM/CARE - MED/SURG/GYN SEMI*

## 2017-04-03 PROCEDURE — 700102 HCHG RX REV CODE 250 W/ 637 OVERRIDE(OP): Performed by: HOSPITALIST

## 2017-04-03 PROCEDURE — 700102 HCHG RX REV CODE 250 W/ 637 OVERRIDE(OP): Performed by: INTERNAL MEDICINE

## 2017-04-03 PROCEDURE — 700105 HCHG RX REV CODE 258: Performed by: INTERNAL MEDICINE

## 2017-04-03 PROCEDURE — A9270 NON-COVERED ITEM OR SERVICE: HCPCS | Performed by: INTERNAL MEDICINE

## 2017-04-03 PROCEDURE — A9270 NON-COVERED ITEM OR SERVICE: HCPCS | Performed by: HOSPITALIST

## 2017-04-03 PROCEDURE — 94640 AIRWAY INHALATION TREATMENT: CPT

## 2017-04-03 PROCEDURE — 700111 HCHG RX REV CODE 636 W/ 250 OVERRIDE (IP): Performed by: HOSPITALIST

## 2017-04-03 PROCEDURE — 700105 HCHG RX REV CODE 258

## 2017-04-03 PROCEDURE — 99233 SBSQ HOSP IP/OBS HIGH 50: CPT | Performed by: HOSPITALIST

## 2017-04-03 PROCEDURE — 700111 HCHG RX REV CODE 636 W/ 250 OVERRIDE (IP)

## 2017-04-03 PROCEDURE — 82274 ASSAY TEST FOR BLOOD FECAL: CPT

## 2017-04-03 PROCEDURE — 700111 HCHG RX REV CODE 636 W/ 250 OVERRIDE (IP): Performed by: INTERNAL MEDICINE

## 2017-04-03 PROCEDURE — 82962 GLUCOSE BLOOD TEST: CPT | Mod: 91

## 2017-04-03 RX ORDER — AMLODIPINE BESYLATE 10 MG/1
10 TABLET ORAL
Status: DISCONTINUED | OUTPATIENT
Start: 2017-04-03 | End: 2017-04-04

## 2017-04-03 RX ORDER — METHYLPREDNISOLONE SODIUM SUCCINATE 125 MG/2ML
62.5 INJECTION, POWDER, LYOPHILIZED, FOR SOLUTION INTRAMUSCULAR; INTRAVENOUS EVERY 8 HOURS
Status: DISCONTINUED | OUTPATIENT
Start: 2017-04-03 | End: 2017-04-04

## 2017-04-03 RX ORDER — SODIUM CHLORIDE 9 MG/ML
INJECTION, SOLUTION INTRAVENOUS
Status: ACTIVE
Start: 2017-04-03 | End: 2017-04-03

## 2017-04-03 RX ADMIN — PIPERACILLIN AND TAZOBACTAM 4.5 G: 4; .5 INJECTION, POWDER, LYOPHILIZED, FOR SOLUTION INTRAVENOUS; PARENTERAL at 12:39

## 2017-04-03 RX ADMIN — BUDESONIDE AND FORMOTEROL FUMARATE DIHYDRATE 2 PUFF: 160; 4.5 AEROSOL RESPIRATORY (INHALATION) at 08:37

## 2017-04-03 RX ADMIN — METHYLPREDNISOLONE SODIUM SUCCINATE 62.5 MG: 125 INJECTION, POWDER, FOR SOLUTION INTRAMUSCULAR; INTRAVENOUS at 00:09

## 2017-04-03 RX ADMIN — VANCOMYCIN HYDROCHLORIDE 1500 MG: 100 INJECTION, POWDER, LYOPHILIZED, FOR SOLUTION INTRAVENOUS at 06:45

## 2017-04-03 RX ADMIN — ACETAMINOPHEN 650 MG: 325 TABLET, FILM COATED ORAL at 21:25

## 2017-04-03 RX ADMIN — METHYLPREDNISOLONE SODIUM SUCCINATE 62.5 MG: 125 INJECTION, POWDER, FOR SOLUTION INTRAMUSCULAR; INTRAVENOUS at 06:15

## 2017-04-03 RX ADMIN — TRAZODONE HYDROCHLORIDE 100 MG: 50 TABLET ORAL at 21:30

## 2017-04-03 RX ADMIN — PIPERACILLIN AND TAZOBACTAM 4.5 G: 4; .5 INJECTION, POWDER, LYOPHILIZED, FOR SOLUTION INTRAVENOUS; PARENTERAL at 06:17

## 2017-04-03 RX ADMIN — PIPERACILLIN AND TAZOBACTAM 4.5 G: 4; .5 INJECTION, POWDER, LYOPHILIZED, FOR SOLUTION INTRAVENOUS; PARENTERAL at 18:34

## 2017-04-03 RX ADMIN — METHYLPREDNISOLONE SODIUM SUCCINATE 62.5 MG: 125 INJECTION, POWDER, FOR SOLUTION INTRAMUSCULAR; INTRAVENOUS at 12:39

## 2017-04-03 RX ADMIN — ESCITALOPRAM OXALATE 10 MG: 10 TABLET ORAL at 08:30

## 2017-04-03 RX ADMIN — GABAPENTIN 600 MG: 300 CAPSULE ORAL at 21:29

## 2017-04-03 RX ADMIN — TIOTROPIUM BROMIDE 1 CAPSULE: 18 CAPSULE ORAL; RESPIRATORY (INHALATION) at 08:37

## 2017-04-03 RX ADMIN — LEVOTHYROXINE SODIUM 75 MCG: 75 TABLET ORAL at 06:14

## 2017-04-03 RX ADMIN — METHYLPREDNISOLONE SODIUM SUCCINATE 62.5 MG: 125 INJECTION, POWDER, FOR SOLUTION INTRAMUSCULAR; INTRAVENOUS at 21:30

## 2017-04-03 RX ADMIN — MONTELUKAST SODIUM 10 MG: 10 TABLET, FILM COATED ORAL at 08:30

## 2017-04-03 RX ADMIN — GABAPENTIN 600 MG: 300 CAPSULE ORAL at 08:31

## 2017-04-03 RX ADMIN — CYCLOBENZAPRINE HYDROCHLORIDE 10 MG: 10 TABLET, FILM COATED ORAL at 12:39

## 2017-04-03 RX ADMIN — HEPARIN SODIUM 5000 UNITS: 5000 INJECTION, SOLUTION INTRAVENOUS; SUBCUTANEOUS at 06:15

## 2017-04-03 RX ADMIN — BUDESONIDE AND FORMOTEROL FUMARATE DIHYDRATE 2 PUFF: 160; 4.5 AEROSOL RESPIRATORY (INHALATION) at 21:37

## 2017-04-03 RX ADMIN — AMLODIPINE BESYLATE 10 MG: 10 TABLET ORAL at 16:18

## 2017-04-03 RX ADMIN — ACETAMINOPHEN 650 MG: 325 TABLET, FILM COATED ORAL at 08:30

## 2017-04-03 RX ADMIN — GABAPENTIN 600 MG: 300 CAPSULE ORAL at 15:03

## 2017-04-03 RX ADMIN — FAMOTIDINE 20 MG: 20 TABLET, FILM COATED ORAL at 08:31

## 2017-04-03 RX ADMIN — SIMVASTATIN 10 MG: 20 TABLET, FILM COATED ORAL at 21:29

## 2017-04-03 RX ADMIN — OXYCODONE HYDROCHLORIDE 10 MG: 10 TABLET ORAL at 18:57

## 2017-04-03 RX ADMIN — DULOXETINE HYDROCHLORIDE 30 MG: 30 CAPSULE, DELAYED RELEASE ORAL at 08:37

## 2017-04-03 RX ADMIN — OXYCODONE HYDROCHLORIDE 10 MG: 10 TABLET ORAL at 12:35

## 2017-04-03 ASSESSMENT — PAIN SCALES - GENERAL
PAINLEVEL_OUTOF10: 2
PAINLEVEL_OUTOF10: 6
PAINLEVEL_OUTOF10: 6
PAINLEVEL_OUTOF10: 2

## 2017-04-03 ASSESSMENT — ENCOUNTER SYMPTOMS
BACK PAIN: 0
SHORTNESS OF BREATH: 1
NERVOUS/ANXIOUS: 0
EYE DISCHARGE: 0
SPUTUM PRODUCTION: 0
WEAKNESS: 1
PALPITATIONS: 0
FEVER: 0
DIZZINESS: 0
CONSTIPATION: 0
DEPRESSION: 0
STRIDOR: 0
WHEEZING: 0
COUGH: 1
HEADACHES: 0
SPEECH CHANGE: 0
ABDOMINAL PAIN: 0
NAUSEA: 0

## 2017-04-03 NOTE — CARE PLAN
Problem: Respiratory:  Goal: Respiratory status will improve  Outcome: PROGRESSING AS EXPECTED  Intervention: Assess and monitor pulmonary status  Pt not showing signs of increased work of breathing.  Resting comfortably on 3.5 L O2 via NC      Problem: Fluid Volume:  Goal: Will maintain balanced intake and output  Outcome: PROGRESSING AS EXPECTED  [place] in place.  Monitoring output

## 2017-04-03 NOTE — CARE PLAN
Problem: Respiratory:  Goal: Respiratory status will improve  Minimal effort of breathing overnight. Calm while sleeping.    Problem: Skin Integrity  Goal: Risk for impaired skin integrity will decrease  Patient is able to turn/repositioned self in bed.

## 2017-04-03 NOTE — DISCHARGE PLANNING
Care Transition Team Assessment    Met with patient at bedside. Patient states that she is currently on oxygen and home health services through [facility]. Patient states that she will most likely discharge home with [facility] services continuued.     Information Source  Orientation : Oriented x 4  Information Given By: Patient  Informant's Name: Priya Callahan  Who is responsible for making decisions for patient? : Patient    Readmission Evaluation  Is this a readmission?: Yes - unplanned readmission  Why do you think you were readmitted?: Septic Shock    Elopement Risk  Legal Hold: No  Ambulatory or Self Mobile in Wheelchair: No-Not an Elopement Risk  Elopement Risk: Not at Risk for Elopement    Interdisciplinary Discharge Planning  Does Admitting Nurse Feel This Could be a Complex Discharge?: Yes  Primary Care Physician: self  Lives with - Patient's Self Care Capacity: Alone and Unable to Care For Self  Patient or legal guardian wants to designate a caregiver (see row info): No    Discharge Preparedness  What is your plan after discharge?: Home with help, Home health care  What are your discharge supports?: Sibling  Prior Functional Level: Ambulatory, Independent with Activities of Daily Living, Independent with Medication Management, Uses Cane, Uses Walker, Uses Wheelchair  Difficulity with ADLs: Walking  Difficulty with ADLs Comment: Pt states uses walker but also has cane and wheelchair  Difficulity with IADLs: None    Functional Assesment  Prior Functional Level: Ambulatory, Independent with Activities of Daily Living, Independent with Medication Management, Uses Cane, Uses Walker, Uses Wheelchair    Finances  Financial Barriers to Discharge: No  Prescription Coverage: Yes (Pharmacy: [facility] in [place])    Vision / Hearing Impairment  Vision Impairment : Yes  Hearing Impairment : No    Values / Beliefs / Concerns  Values / Beliefs Concerns : No    Advance Directive  Advance Directive?: Living Will    Domestic  Abuse  Have you ever been the victim of abuse or violence?: No  Physical Abuse or Sexual Abuse: No  Verbal Abuse or Emotional Abuse: No  Possible Abuse Reported to:: Not Applicable    Psychological Assessment  History of Substance Abuse: None  History of Psychiatric Problems: No  Non-compliant with Treatment: No  Newly Diagnosed Illness: No    Discharge Risks or Barriers  Discharge risks or barriers?: No    Anticipated Discharge Information  Anticipated discharge disposition: Home, [facility] (Pt currently on services with [facility])  Discharge Address: [address] [place] [address] [place] [id number]  Discharge Contact Phone Number: [phone]

## 2017-04-03 NOTE — PROGRESS NOTES
Hospital Medicine Progress Note, Adult, Complex               Author: Angel Huynh Date & Time created: 4/2/2017  9:14 PM     Interval History:  ID: 67yo F with recent hospitalization for pneumonia.  Had acute worsening of SOB and admitted for c/o ongoing pneumonia and sepsis.    Today:  Alert and oriented with clear fluent speech.  She is jovial and states feeling a bit improved but still weak.  Still with a cough.  Care discusssed with ICU RN.    Review of Systems:  Review of Systems   Constitutional: Negative for fever and chills.   Eyes: Negative for discharge.   Respiratory: Positive for cough and shortness of breath. Negative for sputum production, wheezing and stridor.    Cardiovascular: Negative for chest pain, palpitations and leg swelling.   Gastrointestinal: Negative for nausea, abdominal pain and diarrhea.   Genitourinary: Negative for dysuria and hematuria.   Musculoskeletal: Negative for back pain and joint pain.   Skin: Negative for rash.   Neurological: Positive for weakness. Negative for dizziness, speech change and headaches.   Psychiatric/Behavioral: Negative for depression. The patient is not nervous/anxious.        Physical Exam:  Physical Exam   Constitutional: She is oriented to person, place, and time. No distress.   obese   HENT:   Head: Normocephalic and atraumatic.   Nose: Nose normal.   Eyes: Conjunctivae and EOM are normal. Right eye exhibits no discharge. Left eye exhibits no discharge. No scleral icterus.   Neck: No tracheal deviation present.   Cardiovascular: Normal rate, regular rhythm, normal heart sounds and intact distal pulses.    No murmur heard.  Pulmonary/Chest: Effort normal. No stridor. No respiratory distress. She has decreased breath sounds (minimal restriction of air breath sound (Sounds tight)). She has wheezes.   Abdominal: Soft. Bowel sounds are normal. She exhibits no distension. There is no tenderness. There is no rebound.   Musculoskeletal: She exhibits no edema.    Lymphadenopathy:     She has no cervical adenopathy.   Neurological: She is alert and oriented to person, place, and time. No cranial nerve deficit.   Skin: Skin is warm and dry. She is not diaphoretic.   Psychiatric: She has a normal mood and affect. Her behavior is normal.   Vitals reviewed.      Labs:        Invalid input(s): [id number]      Recent Labs      17   0300   SODIUM  135  136   POTASSIUM  5.2  5.2   CHLORIDE  99  106   CO2  30  21   BUN  34*  27*   CREATININE  1.62*  1.43*   MAGNESIUM   --   1.6   PHOSPHORUS   --   3.4   CALCIUM  8.7  7.9*     Recent Labs      17   0300   ALTSGPT  15  17   ASTSGOT  13  20   ALKPHOSPHAT  53  57   TBILIRUBIN  0.7  1.7*   GLUCOSE  101*  301*     Recent Labs      17   0300   RBC  2.92*  3.10*   HEMOGLOBIN  7.8*  8.2*   HEMATOCRIT  27.0*  28.6*   PLATELETCT  177  166     Recent Labs      174  17   0300   WBC  10.4  16.8*   NEUTSPOLYS  80.90*  96.60*   LYMPHOCYTES  8.80*  0.80*   MONOCYTES  8.90  1.60   EOSINOPHILS  0.90  0.00   BASOPHILS  0.10  0.20   ASTSGOT  13  20   ALTSGPT  15  17   ALKPHOSPHAT  53  57   TBILIRUBIN  0.7  1.7*           Hemodynamics:  No data recorded.  Monitored Temp: 36.3 °C (97.3 °F)  Pulse  Av.4  Min: 33  Max: 89Heart Rate (Monitored): 73  Arterial BP: (!) 83/75 mmHg, NIBP: (!) 164/70 mmHg     Respiratory:    Respiration: 16, Pulse Oximetry: 99 %, O2 Daily Delivery Respiratory : Silicone Nasal Cannula     Given By:: Mask, Work Of Breathing / Effort: Mild  RUL Breath Sounds: Diminished, RML Breath Sounds: Diminished, RLL Breath Sounds: Diminished, BIRD Breath Sounds: Diminished, LLL Breath Sounds: Diminished  Fluids:    Intake/Output Summary (Last 24 hours) at 174  Last data filed at 17 1800   Gross per 24 hour   Intake 2767.93 ml   Output   1700 ml   Net 1067.93 ml     Weight: 102.6 kg (226 lb 3.1 oz)  GI/Nutrition:  Orders Placed This  Encounter   Procedures   • DIET ORDER     Standing Status: Standing      Number of Occurrences: 1      Standing Expiration Date:      Order Specific Question:  Diet:     Answer:  Diabetic [3]      Comments:  no milk     Order Specific Question:  Calorie modifications:     Answer:  1800 kcals [4]     Medical Decision Making, by Problem:  Active Hospital Problems    Diagnosis   • Septic shock (CMS-HCC) [A41.9, R65.21]  - On vancomycin and zosyn for possible HCAP.  Stopping cipro doubt pseudomonas and already on zosyn.  - await sputum samples.  - obtain nares MRSA   - Supplemental oxygen  - increase in WBC 4/2  - pressor support with norepinephrine, vasopressin to keep MAP>60 or SBP >90. Check cortisol if not improving  - Monitor I/O's, vitals       *  Anemia:        - Mild increase in Hgb despite IV fluids.          - monitor cbc      *  Renal insufficiency        - mild improvement with IV fluids.  Monitor BMP in am.      * Obesity      *  Hypothyroid:       - levothyroxine      *  Depression:        - Lexapro, Cymbalta      *  Diabetes mellitus II        - SSI, accuchecks.  Likely to have increase of glucose as on solumedrol      *  Chronic obstructive pulmonary disease        - Solumedrol, INH, supplemental oxygen    Medications reviewed, Labs reviewed and Radiology images reviewed  Mckeon catheter: No Mckeon

## 2017-04-03 NOTE — PROGRESS NOTES
Hospital Medicine Progress Note, Adult, Complex               Author: Angel Huynh Date & Time created: 4/3/2017  9:01 AM     Interval History:  ID: 65yo F with recent hospitalization for pneumonia.  Had acute worsening of SOB and admitted for c/o ongoing pneumonia and sepsis.    Today:  Alert and oriented x3.  Speech clear.  States feeling slightly better.  Slight cough.      Review of Systems:  Review of Systems   Constitutional: Negative for fever.   HENT: Negative for congestion.    Eyes: Negative for discharge.   Respiratory: Positive for cough and shortness of breath. Negative for sputum production, wheezing and stridor.    Cardiovascular: Negative for chest pain and palpitations.   Gastrointestinal: Negative for nausea, abdominal pain and constipation.   Genitourinary: Negative for frequency.   Musculoskeletal: Negative for back pain.   Skin: Negative for rash.   Neurological: Positive for weakness. Negative for dizziness, speech change and headaches.   Psychiatric/Behavioral: Negative for depression. The patient is not nervous/anxious.        Physical Exam:  Physical Exam   Constitutional: She is oriented to person, place, and time. No distress.   obese   HENT:   Head: Normocephalic and atraumatic.   Nose: Nose normal.   Eyes: Conjunctivae and EOM are normal. Right eye exhibits no discharge. Left eye exhibits no discharge. No scleral icterus.   Neck: No tracheal deviation present.   Cardiovascular: Normal rate, regular rhythm, normal heart sounds and intact distal pulses.    No murmur heard.  Pulmonary/Chest: Effort normal. No stridor. No respiratory distress. She has decreased breath sounds (minimal restriction of air breath sound (Sounds tight)). She has no wheezes.   Abdominal: Soft. Bowel sounds are normal. She exhibits no distension. There is no tenderness. There is no rebound.   Musculoskeletal: She exhibits no edema.   Lymphadenopathy:     She has no cervical adenopathy.   Neurological: She is alert  and oriented to person, place, and time. No cranial nerve deficit.   Skin: Skin is warm and dry. She is not diaphoretic.   Psychiatric: She has a normal mood and affect. Her behavior is normal.   Vitals reviewed.      Labs:        Invalid input(s): [id number]      Recent Labs      17   0300   SODIUM  135  136   POTASSIUM  5.2  5.2   CHLORIDE  99  106   CO2  30  21   BUN  34*  27*   CREATININE  1.62*  1.43*   MAGNESIUM   --   1.6   PHOSPHORUS   --   3.4   CALCIUM  8.7  7.9*     Recent Labs      17   0300   ALTSGPT  15  17   ASTSGOT  13  20   ALKPHOSPHAT  53  57   TBILIRUBIN  0.7  1.7*   GLUCOSE  101*  301*     Recent Labs      17   0300   RBC  2.92*  3.10*   HEMOGLOBIN  7.8*  8.2*   HEMATOCRIT  27.0*  28.6*   PLATELETCT  177  166     Recent Labs      17   0300   WBC  10.4  16.8*   NEUTSPOLYS  80.90*  96.60*   LYMPHOCYTES  8.80*  0.80*   MONOCYTES  8.90  1.60   EOSINOPHILS  0.90  0.00   BASOPHILS  0.10  0.20   ASTSGOT  13  20   ALTSGPT  15  17   ALKPHOSPHAT  53  57   TBILIRUBIN  0.7  1.7*           Hemodynamics:  No data recorded.  Monitored Temp: 36.3 °C (97.3 °F)  Pulse  Av  Min: 33  Max: 89Heart Rate (Monitored): 63  Arterial BP: (!) 83/75 mmHg, NIBP: 132/53 mmHg     Respiratory:    Respiration: 15, Pulse Oximetry: 98 %, O2 Daily Delivery Respiratory : Silicone Nasal Cannula     Given By:: Mask, Work Of Breathing / Effort: Mild  RUL Breath Sounds: Clear, RML Breath Sounds: Clear, RLL Breath Sounds: Clear, BIRD Breath Sounds: Clear, LLL Breath Sounds: Clear  Fluids:    Intake/Output Summary (Last 24 hours) at 17 0901  Last data filed at 17 0600   Gross per 24 hour   Intake   2410 ml   Output   1920 ml   Net    490 ml        GI/Nutrition:  Orders Placed This Encounter   Procedures   • DIET ORDER     Standing Status: Standing      Number of Occurrences: 1      Standing Expiration Date:      Order Specific  Question:  Diet:     Answer:  Diabetic [3]      Comments:  no milk     Order Specific Question:  Calorie modifications:     Answer:  1800 kcals [4]     Medical Decision Making, by Problem:  Active Hospital Problems    Diagnosis   • Septic shock (CMS-HCC) [A41.9, R65.21]  - On vancomycin and zosyn for possible HCAP.  - await sputum samples and nares MRSA   - Supplemental oxygen  - increase in WBC 4/2  - wean steroids  - Off pressors and need to add amlodipine  - Monitor I/O's, vitals       *  Anemia:        - Mild increase in Hgb despite IV fluids.          - monitor cbc      *  Renal insufficiency        - mild improvement with IV fluids.  Monitor BMP in am.      * Obesity      *  Hypothyroid:       - levothyroxine      *  Depression:        - Lexapro, Cymbalta      *  Diabetes mellitus II        - SSI, accuchecks.  Likely to have increase of glucose as on solumedrol      *  Chronic obstructive pulmonary disease        - Solumedrol, INH, supplemental oxygen    DISPO:  Transfer out of ICU to medical floor.  Remove sykes catheter.  Okay to keep Central line until peripheral access attainable as very difficult veinous access.    Medications reviewed, Labs reviewed and Radiology images reviewed  Sykes catheter: No Sykes

## 2017-04-03 NOTE — CARE PLAN
Problem: Venous Thromboembolism (VTW)/Deep Vein Thrombosis (DVT) Prevention:  Goal: Patient will participate in Venous Thrombosis (VTE)/Deep Vein Thrombosis (DVT)Prevention Measures  Pt receiving heparin    Problem: Respiratory:  Goal: Respiratory status will improve  Outcome: PROGRESSING AS EXPECTED  RN coordinating with RT to ensure pts steady improvement in respiratory status

## 2017-04-04 ENCOUNTER — TELEPHONE (OUTPATIENT)
Dept: MEDICAL GROUP | Age: 67
End: 2017-04-04

## 2017-04-04 PROBLEM — E11.9 DIABETES MELLITUS, TYPE II (HCC): Status: ACTIVE | Noted: 2017-04-04

## 2017-04-04 PROBLEM — G62.9 NEUROPATHY: Status: ACTIVE | Noted: 2017-04-04

## 2017-04-04 PROBLEM — D72.829 LEUKOCYTOSIS: Status: ACTIVE | Noted: 2017-04-04

## 2017-04-04 PROBLEM — E78.5 HLD (HYPERLIPIDEMIA): Status: ACTIVE | Noted: 2017-04-04

## 2017-04-04 PROBLEM — J18.9 HCAP (HEALTHCARE-ASSOCIATED PNEUMONIA): Status: ACTIVE | Noted: 2017-04-04

## 2017-04-04 PROBLEM — N18.2 CKD (CHRONIC KIDNEY DISEASE), STAGE II: Status: ACTIVE | Noted: 2017-04-04

## 2017-04-04 PROBLEM — J96.22 ACUTE ON CHRONIC RESPIRATORY FAILURE WITH HYPOXIA AND HYPERCAPNIA (HCC): Status: ACTIVE | Noted: 2017-04-04

## 2017-04-04 PROBLEM — E80.6 HYPERBILIRUBINEMIA: Status: ACTIVE | Noted: 2017-04-04

## 2017-04-04 PROBLEM — G93.40 ENCEPHALOPATHY: Status: ACTIVE | Noted: 2017-04-04

## 2017-04-04 PROBLEM — N17.9 AKI (ACUTE KIDNEY INJURY) (HCC): Status: ACTIVE | Noted: 2017-04-04

## 2017-04-04 PROBLEM — J96.21 ACUTE ON CHRONIC RESPIRATORY FAILURE WITH HYPOXIA AND HYPERCAPNIA (HCC): Status: ACTIVE | Noted: 2017-04-04

## 2017-04-04 LAB
BACTERIA SPEC RESP CULT: NORMAL
GLUCOSE BLD-MCNC: 233 MG/DL (ref 65–99)
GLUCOSE BLD-MCNC: 276 MG/DL (ref 65–99)
GLUCOSE BLD-MCNC: 291 MG/DL (ref 65–99)
GLUCOSE BLD-MCNC: 293 MG/DL (ref 65–99)
GRAM STN SPEC: NORMAL
SIGNIFICANT IND 70042: NORMAL
SITE SITE: NORMAL
SOURCE SOURCE: NORMAL

## 2017-04-04 PROCEDURE — 700105 HCHG RX REV CODE 258: Performed by: INTERNAL MEDICINE

## 2017-04-04 PROCEDURE — 770006 HCHG ROOM/CARE - MED/SURG/GYN SEMI*

## 2017-04-04 PROCEDURE — 700111 HCHG RX REV CODE 636 W/ 250 OVERRIDE (IP): Performed by: HOSPITALIST

## 2017-04-04 PROCEDURE — 700102 HCHG RX REV CODE 250 W/ 637 OVERRIDE(OP): Performed by: HOSPITALIST

## 2017-04-04 PROCEDURE — 700111 HCHG RX REV CODE 636 W/ 250 OVERRIDE (IP): Performed by: INTERNAL MEDICINE

## 2017-04-04 PROCEDURE — A9270 NON-COVERED ITEM OR SERVICE: HCPCS | Performed by: HOSPITALIST

## 2017-04-04 PROCEDURE — 82962 GLUCOSE BLOOD TEST: CPT | Mod: 91

## 2017-04-04 PROCEDURE — A9270 NON-COVERED ITEM OR SERVICE: HCPCS | Performed by: INTERNAL MEDICINE

## 2017-04-04 PROCEDURE — 99233 SBSQ HOSP IP/OBS HIGH 50: CPT | Performed by: INTERNAL MEDICINE

## 2017-04-04 PROCEDURE — 700102 HCHG RX REV CODE 250 W/ 637 OVERRIDE(OP): Performed by: INTERNAL MEDICINE

## 2017-04-04 PROCEDURE — 700102 HCHG RX REV CODE 250 W/ 637 OVERRIDE(OP)

## 2017-04-04 PROCEDURE — A9270 NON-COVERED ITEM OR SERVICE: HCPCS

## 2017-04-04 RX ORDER — ALBUTEROL SULFATE 90 UG/1
AEROSOL, METERED RESPIRATORY (INHALATION)
Status: COMPLETED
Start: 2017-04-04 | End: 2017-04-04

## 2017-04-04 RX ORDER — LISINOPRIL 20 MG/1
40 TABLET ORAL
Status: DISCONTINUED | OUTPATIENT
Start: 2017-04-04 | End: 2017-04-09 | Stop reason: HOSPADM

## 2017-04-04 RX ORDER — PREDNISONE 50 MG/1
50 TABLET ORAL DAILY
Status: DISCONTINUED | OUTPATIENT
Start: 2017-04-05 | End: 2017-04-06

## 2017-04-04 RX ORDER — DULOXETIN HYDROCHLORIDE 30 MG/1
60 CAPSULE, DELAYED RELEASE ORAL DAILY
Status: DISCONTINUED | OUTPATIENT
Start: 2017-04-05 | End: 2017-04-09 | Stop reason: HOSPADM

## 2017-04-04 RX ORDER — POLYETHYLENE GLYCOL 3350 17 G/17G
1 POWDER, FOR SOLUTION ORAL 2 TIMES DAILY
Status: DISCONTINUED | OUTPATIENT
Start: 2017-04-04 | End: 2017-04-09 | Stop reason: HOSPADM

## 2017-04-04 RX ORDER — GUAIFENESIN 600 MG/1
600 TABLET, EXTENDED RELEASE ORAL EVERY 12 HOURS
Status: DISCONTINUED | OUTPATIENT
Start: 2017-04-04 | End: 2017-04-09 | Stop reason: HOSPADM

## 2017-04-04 RX ORDER — DOXYCYCLINE 100 MG/1
100 TABLET ORAL EVERY 12 HOURS
Status: DISCONTINUED | OUTPATIENT
Start: 2017-04-04 | End: 2017-04-09 | Stop reason: HOSPADM

## 2017-04-04 RX ORDER — INSULIN GLARGINE 100 [IU]/ML
10 INJECTION, SOLUTION SUBCUTANEOUS EVERY EVENING
Status: DISCONTINUED | OUTPATIENT
Start: 2017-04-04 | End: 2017-04-09 | Stop reason: HOSPADM

## 2017-04-04 RX ORDER — ALBUTEROL SULFATE 90 UG/1
2 AEROSOL, METERED RESPIRATORY (INHALATION)
Status: DISCONTINUED | OUTPATIENT
Start: 2017-04-04 | End: 2017-04-09 | Stop reason: HOSPADM

## 2017-04-04 RX ADMIN — METHYLPREDNISOLONE SODIUM SUCCINATE 62.5 MG: 125 INJECTION, POWDER, FOR SOLUTION INTRAMUSCULAR; INTRAVENOUS at 05:59

## 2017-04-04 RX ADMIN — PIPERACILLIN AND TAZOBACTAM 4.5 G: 4; .5 INJECTION, POWDER, LYOPHILIZED, FOR SOLUTION INTRAVENOUS; PARENTERAL at 17:21

## 2017-04-04 RX ADMIN — TRAZODONE HYDROCHLORIDE 100 MG: 50 TABLET ORAL at 20:58

## 2017-04-04 RX ADMIN — OXYCODONE HYDROCHLORIDE 10 MG: 10 TABLET ORAL at 14:11

## 2017-04-04 RX ADMIN — HEPARIN SODIUM 5000 UNITS: 5000 INJECTION, SOLUTION INTRAVENOUS; SUBCUTANEOUS at 14:11

## 2017-04-04 RX ADMIN — FAMOTIDINE 20 MG: 20 TABLET, FILM COATED ORAL at 08:58

## 2017-04-04 RX ADMIN — METHYLPREDNISOLONE SODIUM SUCCINATE 62.5 MG: 125 INJECTION, POWDER, FOR SOLUTION INTRAMUSCULAR; INTRAVENOUS at 14:10

## 2017-04-04 RX ADMIN — ALBUTEROL SULFATE 2 PUFF: 90 AEROSOL, METERED RESPIRATORY (INHALATION) at 15:17

## 2017-04-04 RX ADMIN — INSULIN GLARGINE 10 UNITS: 100 INJECTION, SOLUTION SUBCUTANEOUS at 21:03

## 2017-04-04 RX ADMIN — PIPERACILLIN AND TAZOBACTAM 4.5 G: 4; .5 INJECTION, POWDER, LYOPHILIZED, FOR SOLUTION INTRAVENOUS; PARENTERAL at 11:51

## 2017-04-04 RX ADMIN — HEPARIN SODIUM 5000 UNITS: 5000 INJECTION, SOLUTION INTRAVENOUS; SUBCUTANEOUS at 05:59

## 2017-04-04 RX ADMIN — GUAIFENESIN 600 MG: 600 TABLET, EXTENDED RELEASE ORAL at 11:50

## 2017-04-04 RX ADMIN — OXYCODONE HYDROCHLORIDE 10 MG: 10 TABLET ORAL at 20:57

## 2017-04-04 RX ADMIN — LEVOTHYROXINE SODIUM 75 MCG: 75 TABLET ORAL at 05:59

## 2017-04-04 RX ADMIN — BUDESONIDE AND FORMOTEROL FUMARATE DIHYDRATE 2 PUFF: 160; 4.5 AEROSOL RESPIRATORY (INHALATION) at 20:56

## 2017-04-04 RX ADMIN — ESCITALOPRAM OXALATE 10 MG: 10 TABLET ORAL at 08:58

## 2017-04-04 RX ADMIN — PIPERACILLIN AND TAZOBACTAM 4.5 G: 4; .5 INJECTION, POWDER, LYOPHILIZED, FOR SOLUTION INTRAVENOUS; PARENTERAL at 00:14

## 2017-04-04 RX ADMIN — GABAPENTIN 600 MG: 300 CAPSULE ORAL at 08:56

## 2017-04-04 RX ADMIN — TIOTROPIUM BROMIDE 1 CAPSULE: 18 CAPSULE ORAL; RESPIRATORY (INHALATION) at 09:02

## 2017-04-04 RX ADMIN — LISINOPRIL 40 MG: 20 TABLET ORAL at 20:57

## 2017-04-04 RX ADMIN — DOXYCYCLINE 100 MG: 100 TABLET ORAL at 20:57

## 2017-04-04 RX ADMIN — GABAPENTIN 600 MG: 300 CAPSULE ORAL at 20:58

## 2017-04-04 RX ADMIN — BUDESONIDE AND FORMOTEROL FUMARATE DIHYDRATE 2 PUFF: 160; 4.5 AEROSOL RESPIRATORY (INHALATION) at 09:01

## 2017-04-04 RX ADMIN — PIPERACILLIN AND TAZOBACTAM 4.5 G: 4; .5 INJECTION, POWDER, LYOPHILIZED, FOR SOLUTION INTRAVENOUS; PARENTERAL at 05:54

## 2017-04-04 RX ADMIN — AMLODIPINE BESYLATE 10 MG: 10 TABLET ORAL at 08:58

## 2017-04-04 RX ADMIN — MONTELUKAST SODIUM 10 MG: 10 TABLET, FILM COATED ORAL at 08:59

## 2017-04-04 RX ADMIN — OXYCODONE HYDROCHLORIDE 10 MG: 10 TABLET ORAL at 01:02

## 2017-04-04 RX ADMIN — DULOXETINE HYDROCHLORIDE 30 MG: 30 CAPSULE, DELAYED RELEASE ORAL at 08:57

## 2017-04-04 RX ADMIN — SIMVASTATIN 10 MG: 20 TABLET, FILM COATED ORAL at 21:00

## 2017-04-04 RX ADMIN — GUAIFENESIN 600 MG: 600 TABLET, EXTENDED RELEASE ORAL at 20:58

## 2017-04-04 RX ADMIN — GABAPENTIN 600 MG: 300 CAPSULE ORAL at 14:10

## 2017-04-04 ASSESSMENT — ENCOUNTER SYMPTOMS
COUGH: 1
MYALGIAS: 0
INSOMNIA: 1
NAUSEA: 0
DIZZINESS: 0
BLOOD IN STOOL: 0
SPUTUM PRODUCTION: 1
SHORTNESS OF BREATH: 1
VOMITING: 0
WEAKNESS: 1
ABDOMINAL PAIN: 0
NERVOUS/ANXIOUS: 1
HEARTBURN: 0
DOUBLE VISION: 0
WHEEZING: 1
FOCAL WEAKNESS: 0
FEVER: 0
DEPRESSION: 1
BLURRED VISION: 0
CHILLS: 0
SENSORY CHANGE: 0
HEADACHES: 0
SPEECH CHANGE: 0
CONSTIPATION: 0
DIARRHEA: 0

## 2017-04-04 ASSESSMENT — PAIN SCALES - GENERAL
PAINLEVEL_OUTOF10: 8
PAINLEVEL_OUTOF10: 2
PAINLEVEL_OUTOF10: 2
PAINLEVEL_OUTOF10: 3

## 2017-04-04 NOTE — CARE PLAN
Problem: Safety  Goal: Will remain free from injury  Safety precautions in place, call light within reach, bed in lowest locked position, communication board updated, room free of clutter.     Problem: Skin Integrity  Goal: Risk for impaired skin integrity will decrease  Patient kept dry and free of moisture, bed sheets kept flat and free of wrinkles.

## 2017-04-04 NOTE — RESPIRATORY CARE
COPD EDUCATION by COPD CLINICAL EDUCATOR  4/4/2017 at 1:49 PM by Cristela Martinez     Patient reviewed by COPD education team. Patient does not qualify for COPD program.

## 2017-04-04 NOTE — TELEPHONE ENCOUNTER
1. Caller Name: [facility] in [place]                                         Call Back Number: [phone]        Patient approves a detailed voicemail message: no       Pharmacy states that combivent inhaler is no longer made, however, there is a combivent respirmat available.  If Dr. Rojas would like this changed, please advise.

## 2017-04-05 LAB
ALBUMIN SERPL BCP-MCNC: 3 G/DL (ref 3.2–4.9)
ALBUMIN/GLOB SERPL: 1.3 G/DL
ALP SERPL-CCNC: 45 U/L (ref 30–99)
ALT SERPL-CCNC: 27 U/L (ref 2–50)
ANION GAP SERPL CALC-SCNC: 7 MMOL/L (ref 0–11.9)
AST SERPL-CCNC: 22 U/L (ref 12–45)
BASOPHILS # BLD AUTO: 0 % (ref 0–1.8)
BASOPHILS # BLD: 0 K/UL (ref 0–0.12)
BILIRUB SERPL-MCNC: 0.6 MG/DL (ref 0.1–1.5)
BUN SERPL-MCNC: 41 MG/DL (ref 8–22)
CALCIUM SERPL-MCNC: 8.8 MG/DL (ref 8.5–10.5)
CHLORIDE SERPL-SCNC: 107 MMOL/L (ref 96–112)
CO2 SERPL-SCNC: 21 MMOL/L (ref 20–33)
CREAT SERPL-MCNC: 1.54 MG/DL (ref 0.5–1.4)
EOSINOPHIL # BLD AUTO: 0 K/UL (ref 0–0.51)
EOSINOPHIL NFR BLD: 0 % (ref 0–6.9)
ERYTHROCYTE [DISTWIDTH] IN BLOOD BY AUTOMATED COUNT: 64.9 FL (ref 35.9–50)
FERRITIN SERPL-MCNC: 125.1 NG/ML (ref 10–291)
FOLATE SERPL-MCNC: 18.3 NG/ML
GFR SERPL CREATININE-BSD FRML MDRD: 34 ML/MIN/1.73 M 2
GLOBULIN SER CALC-MCNC: 2.4 G/DL (ref 1.9–3.5)
GLUCOSE BLD-MCNC: 143 MG/DL (ref 65–99)
GLUCOSE BLD-MCNC: 155 MG/DL (ref 65–99)
GLUCOSE BLD-MCNC: 203 MG/DL (ref 65–99)
GLUCOSE BLD-MCNC: 258 MG/DL (ref 65–99)
GLUCOSE SERPL-MCNC: 214 MG/DL (ref 65–99)
HCT VFR BLD AUTO: 21.8 % (ref 37–47)
HGB BLD-MCNC: 6.6 G/DL (ref 12–16)
HGB BLD-MCNC: 7.5 G/DL (ref 12–16)
IMM GRANULOCYTES # BLD AUTO: 0.1 K/UL (ref 0–0.11)
IMM GRANULOCYTES NFR BLD AUTO: 0.9 % (ref 0–0.9)
IRON SATN MFR SERPL: 11 % (ref 15–55)
IRON SERPL-MCNC: 36 UG/DL (ref 40–170)
LYMPHOCYTES # BLD AUTO: 0.28 K/UL (ref 1–4.8)
LYMPHOCYTES NFR BLD: 2.6 % (ref 22–41)
MAGNESIUM SERPL-MCNC: 1.9 MG/DL (ref 1.5–2.5)
MCH RBC QN AUTO: 26.8 PG (ref 27–33)
MCHC RBC AUTO-ENTMCNC: 30.3 G/DL (ref 33.6–35)
MCV RBC AUTO: 88.6 FL (ref 81.4–97.8)
MONOCYTES # BLD AUTO: 0.29 K/UL (ref 0–0.85)
MONOCYTES NFR BLD AUTO: 2.7 % (ref 0–13.4)
NEUTROPHILS # BLD AUTO: 10.16 K/UL (ref 2–7.15)
NEUTROPHILS NFR BLD: 93.8 % (ref 44–72)
NRBC # BLD AUTO: 0.02 K/UL
NRBC BLD AUTO-RTO: 0.2 /100 WBC
PHOSPHATE SERPL-MCNC: 3.5 MG/DL (ref 2.5–4.5)
PLATELET # BLD AUTO: 111 K/UL (ref 164–446)
PMV BLD AUTO: 9.2 FL (ref 9–12.9)
POTASSIUM SERPL-SCNC: 4 MMOL/L (ref 3.6–5.5)
PROT SERPL-MCNC: 5.4 G/DL (ref 6–8.2)
RBC # BLD AUTO: 2.46 M/UL (ref 4.2–5.4)
SODIUM SERPL-SCNC: 135 MMOL/L (ref 135–145)
TIBC SERPL-MCNC: 328 UG/DL (ref 250–450)
VIT B12 SERPL-MCNC: 384 PG/ML (ref 211–911)
WBC # BLD AUTO: 10.8 K/UL (ref 4.8–10.8)

## 2017-04-05 PROCEDURE — 83540 ASSAY OF IRON: CPT

## 2017-04-05 PROCEDURE — 305522 CATH IV 24GA X 3/4": Performed by: INTERNAL MEDICINE

## 2017-04-05 PROCEDURE — 85025 COMPLETE CBC W/AUTO DIFF WBC: CPT

## 2017-04-05 PROCEDURE — 99233 SBSQ HOSP IP/OBS HIGH 50: CPT | Performed by: INTERNAL MEDICINE

## 2017-04-05 PROCEDURE — 82962 GLUCOSE BLOOD TEST: CPT

## 2017-04-05 PROCEDURE — G8988 SELF CARE GOAL STATUS: HCPCS | Mod: CI

## 2017-04-05 PROCEDURE — 700102 HCHG RX REV CODE 250 W/ 637 OVERRIDE(OP): Performed by: INTERNAL MEDICINE

## 2017-04-05 PROCEDURE — 80053 COMPREHEN METABOLIC PANEL: CPT

## 2017-04-05 PROCEDURE — 82728 ASSAY OF FERRITIN: CPT

## 2017-04-05 PROCEDURE — G8979 MOBILITY GOAL STATUS: HCPCS | Mod: CI

## 2017-04-05 PROCEDURE — G8978 MOBILITY CURRENT STATUS: HCPCS | Mod: CJ

## 2017-04-05 PROCEDURE — 770021 HCHG ROOM/CARE - ISO PRIVATE

## 2017-04-05 PROCEDURE — 83550 IRON BINDING TEST: CPT

## 2017-04-05 PROCEDURE — 97165 OT EVAL LOW COMPLEX 30 MIN: CPT

## 2017-04-05 PROCEDURE — 700111 HCHG RX REV CODE 636 W/ 250 OVERRIDE (IP): Performed by: INTERNAL MEDICINE

## 2017-04-05 PROCEDURE — 83735 ASSAY OF MAGNESIUM: CPT

## 2017-04-05 PROCEDURE — G8987 SELF CARE CURRENT STATUS: HCPCS | Mod: CJ

## 2017-04-05 PROCEDURE — 700102 HCHG RX REV CODE 250 W/ 637 OVERRIDE(OP): Performed by: HOSPITALIST

## 2017-04-05 PROCEDURE — 97162 PT EVAL MOD COMPLEX 30 MIN: CPT

## 2017-04-05 PROCEDURE — 84100 ASSAY OF PHOSPHORUS: CPT

## 2017-04-05 PROCEDURE — 82746 ASSAY OF FOLIC ACID SERUM: CPT

## 2017-04-05 PROCEDURE — A9270 NON-COVERED ITEM OR SERVICE: HCPCS | Performed by: HOSPITALIST

## 2017-04-05 PROCEDURE — A9270 NON-COVERED ITEM OR SERVICE: HCPCS | Performed by: INTERNAL MEDICINE

## 2017-04-05 PROCEDURE — 82607 VITAMIN B-12: CPT

## 2017-04-05 PROCEDURE — 85018 HEMOGLOBIN: CPT

## 2017-04-05 PROCEDURE — 700105 HCHG RX REV CODE 258: Performed by: INTERNAL MEDICINE

## 2017-04-05 PROCEDURE — 87493 C DIFF AMPLIFIED PROBE: CPT

## 2017-04-05 RX ORDER — AMLODIPINE BESYLATE 5 MG/1
5 TABLET ORAL
Status: DISCONTINUED | OUTPATIENT
Start: 2017-04-05 | End: 2017-04-06

## 2017-04-05 RX ORDER — FERROUS GLUCONATE 324(38)MG
324 TABLET ORAL 2 TIMES DAILY WITH MEALS
Status: DISCONTINUED | OUTPATIENT
Start: 2017-04-05 | End: 2017-04-09 | Stop reason: HOSPADM

## 2017-04-05 RX ORDER — ASCORBIC ACID 500 MG
500 TABLET ORAL
Status: DISCONTINUED | OUTPATIENT
Start: 2017-04-05 | End: 2017-04-09 | Stop reason: HOSPADM

## 2017-04-05 RX ADMIN — PIPERACILLIN AND TAZOBACTAM 3.38 G: 3; .375 INJECTION, POWDER, LYOPHILIZED, FOR SOLUTION INTRAVENOUS; PARENTERAL at 05:47

## 2017-04-05 RX ADMIN — DOXYCYCLINE 100 MG: 100 TABLET ORAL at 19:57

## 2017-04-05 RX ADMIN — PIPERACILLIN AND TAZOBACTAM 3.38 G: 3; .375 INJECTION, POWDER, LYOPHILIZED, FOR SOLUTION INTRAVENOUS; PARENTERAL at 11:58

## 2017-04-05 RX ADMIN — SIMVASTATIN 10 MG: 20 TABLET, FILM COATED ORAL at 19:58

## 2017-04-05 RX ADMIN — PIPERACILLIN AND TAZOBACTAM 3.38 G: 3; .375 INJECTION, POWDER, LYOPHILIZED, FOR SOLUTION INTRAVENOUS; PARENTERAL at 00:04

## 2017-04-05 RX ADMIN — AMLODIPINE BESYLATE 5 MG: 5 TABLET ORAL at 19:58

## 2017-04-05 RX ADMIN — DULOXETINE HYDROCHLORIDE 60 MG: 30 CAPSULE, DELAYED RELEASE ORAL at 07:57

## 2017-04-05 RX ADMIN — FAMOTIDINE 20 MG: 20 TABLET, FILM COATED ORAL at 07:58

## 2017-04-05 RX ADMIN — GABAPENTIN 600 MG: 300 CAPSULE ORAL at 07:58

## 2017-04-05 RX ADMIN — LISINOPRIL 40 MG: 20 TABLET ORAL at 07:58

## 2017-04-05 RX ADMIN — GABAPENTIN 600 MG: 300 CAPSULE ORAL at 19:57

## 2017-04-05 RX ADMIN — BUDESONIDE AND FORMOTEROL FUMARATE DIHYDRATE 2 PUFF: 160; 4.5 AEROSOL RESPIRATORY (INHALATION) at 07:58

## 2017-04-05 RX ADMIN — PIPERACILLIN AND TAZOBACTAM 3.38 G: 3; .375 INJECTION, POWDER, LYOPHILIZED, FOR SOLUTION INTRAVENOUS; PARENTERAL at 19:57

## 2017-04-05 RX ADMIN — ESCITALOPRAM OXALATE 10 MG: 10 TABLET ORAL at 07:58

## 2017-04-05 RX ADMIN — TIOTROPIUM BROMIDE 1 CAPSULE: 18 CAPSULE ORAL; RESPIRATORY (INHALATION) at 07:59

## 2017-04-05 RX ADMIN — BUDESONIDE AND FORMOTEROL FUMARATE DIHYDRATE 2 PUFF: 160; 4.5 AEROSOL RESPIRATORY (INHALATION) at 20:05

## 2017-04-05 RX ADMIN — INSULIN GLARGINE 10 UNITS: 100 INJECTION, SOLUTION SUBCUTANEOUS at 19:57

## 2017-04-05 RX ADMIN — GUAIFENESIN 600 MG: 600 TABLET, EXTENDED RELEASE ORAL at 07:58

## 2017-04-05 RX ADMIN — TRAZODONE HYDROCHLORIDE 100 MG: 50 TABLET ORAL at 19:58

## 2017-04-05 RX ADMIN — CYCLOBENZAPRINE HYDROCHLORIDE 10 MG: 10 TABLET, FILM COATED ORAL at 19:58

## 2017-04-05 RX ADMIN — OXYCODONE HYDROCHLORIDE AND ACETAMINOPHEN 500 MG: 500 TABLET ORAL at 19:58

## 2017-04-05 RX ADMIN — PREDNISONE 50 MG: 50 TABLET ORAL at 08:07

## 2017-04-05 RX ADMIN — MONTELUKAST SODIUM 10 MG: 10 TABLET, FILM COATED ORAL at 07:58

## 2017-04-05 RX ADMIN — OXYCODONE HYDROCHLORIDE 10 MG: 10 TABLET ORAL at 19:58

## 2017-04-05 RX ADMIN — FERROUS GLUCONATE 324 MG: 324 TABLET ORAL at 19:58

## 2017-04-05 RX ADMIN — GABAPENTIN 600 MG: 300 CAPSULE ORAL at 17:33

## 2017-04-05 RX ADMIN — LEVOTHYROXINE SODIUM 75 MCG: 75 TABLET ORAL at 05:41

## 2017-04-05 RX ADMIN — GUAIFENESIN 600 MG: 600 TABLET, EXTENDED RELEASE ORAL at 19:57

## 2017-04-05 RX ADMIN — OXYCODONE HYDROCHLORIDE 10 MG: 10 TABLET ORAL at 03:54

## 2017-04-05 RX ADMIN — OXYCODONE HYDROCHLORIDE 5 MG: 5 TABLET ORAL at 13:00

## 2017-04-05 RX ADMIN — DOXYCYCLINE 100 MG: 100 TABLET ORAL at 07:57

## 2017-04-05 ASSESSMENT — ENCOUNTER SYMPTOMS
NERVOUS/ANXIOUS: 1
BLURRED VISION: 0
SPEECH CHANGE: 0
CHILLS: 0
COUGH: 1
MYALGIAS: 0
NAUSEA: 0
SHORTNESS OF BREATH: 1
ABDOMINAL PAIN: 0
BLOOD IN STOOL: 0
SENSORY CHANGE: 0
FEVER: 0
DIZZINESS: 0
SPUTUM PRODUCTION: 1
HEADACHES: 0
INSOMNIA: 1
WEAKNESS: 1
DOUBLE VISION: 0
FOCAL WEAKNESS: 0
DIARRHEA: 0
WHEEZING: 1
DEPRESSION: 1
VOMITING: 0
HEARTBURN: 0
CONSTIPATION: 0

## 2017-04-05 ASSESSMENT — PAIN SCALES - GENERAL
PAINLEVEL_OUTOF10: 0
PAINLEVEL_OUTOF10: 4

## 2017-04-05 ASSESSMENT — GAIT ASSESSMENTS
DISTANCE (FEET): 60
GAIT LEVEL OF ASSIST: STAND BY ASSIST
DEVIATION: BRADYKINETIC

## 2017-04-05 ASSESSMENT — ACTIVITIES OF DAILY LIVING (ADL): TOILETING: INDEPENDENT

## 2017-04-05 NOTE — CARE PLAN
Problem: Respiratory:  Goal: Respiratory status will improve  Outcome: PROGRESSING AS EXPECTED    Problem: Skin Integrity  Goal: Risk for impaired skin integrity will decrease  Outcome: PROGRESSING AS EXPECTED

## 2017-04-05 NOTE — THERAPY
"Physical Therapy Evaluation completed.   Bed Mobility:  Supine to Sit: Supervised  Transfers: Sit to Stand: Supervised  Gait: Level Of Assist: Stand by Assist with Front-Wheel Walker       Plan of Care: Will benefit from Physical Therapy 3 times per week  Discharge Recommendations: Equipment: Will Continue to Assess for Equipment Needs. Post-acute therapy : Unable to determine at this time.    Pt is a pleasant 65 yo F who had two falls at home and came to [facility].  Pt was found to have septic shock.  Pt presents now with low H&H and therefore was quickly fatigued during assessment. Pt has decreased activity tolerance, strength, and balance.  Pt performed bed mobility with spv and transfers wtih spv with no AD.  Pt ambluated 60' with no AD and CGA and was SOB.  Pt will continue to benefit from skilled PT while here at [facility].  Pt did not have any LOB.      See \"Rehab Therapy-Acute\" Patient Summary Report for complete documentation.     "

## 2017-04-05 NOTE — CARE PLAN
Problem: Infection  Goal: Will remain free from infection  Outcome: PROGRESSING AS EXPECTED  Administered AM scheduled IV antibiotic. No signs of worsening infection at this time.     Problem: Respiratory:  Goal: Respiratory status will improve  Outcome: PROGRESSING SLOWER THAN EXPECTED  Weaned O2 down to 3.5L, baseline at home. Expiratory wheezes auscultated in upper lobes, encouraging incentive spirometry.

## 2017-04-05 NOTE — PROGRESS NOTES
AAOx4. C/o 0/10 pain, declining intervention at this time. -N/V. -N/T. Denies new onset of chest pain/SOB. On 3.5L NC, satting >90%. Standby assist, tolerates well. Low Hgb this AM, to be redrawn per MD. POC discussed, denies further needs at this time. Bed alarm on, call light within reach & hourly rounding in place.

## 2017-04-05 NOTE — PROGRESS NOTES
Hospital Medicine Progress Note, Adult, Complex               Author: Norman Boyce Date & Time created: 4/4/2017  7:33 PM     Interval History:  66 y.o. female admitted 4/1/2017 with a CC of Found unresponsive and admitting dx of septic shock 2/2 HCAP.   Recently discharged with CAP.   Septic shock, copd exacerbation, HCAP, HAN on presentation. Required vasopressor support. Admitted to ICU.   Stabilized and transferred out of ICU.   This am she feels well. Some lethargy. Ongoing SOB, Sputum production and wheezing.   No new complaints. Wants to go home. Declining SNF even if advised.     Review of Systems:  Review of Systems   Constitutional: Positive for malaise/fatigue. Negative for fever and chills.   HENT: Negative for hearing loss.    Eyes: Negative for blurred vision and double vision.   Respiratory: Positive for cough, sputum production, shortness of breath and wheezing.    Cardiovascular: Positive for leg swelling. Negative for chest pain.   Gastrointestinal: Negative for heartburn, nausea, vomiting, abdominal pain, diarrhea, constipation, blood in stool and melena.   Genitourinary: Negative for dysuria and urgency.   Musculoskeletal: Positive for joint pain (Chronic). Negative for myalgias.   Skin: Negative for itching and rash.   Neurological: Positive for weakness. Negative for dizziness, sensory change, speech change, focal weakness and headaches.   Psychiatric/Behavioral: Positive for depression. The patient is nervous/anxious and has insomnia.        Physical Exam:  Physical Exam   Constitutional: She is oriented to person, place, and time. No distress.   HENT:   Head: Normocephalic.   Mouth/Throat: Oropharynx is clear and moist. No oropharyngeal exudate.   Eyes: Conjunctivae are normal. Pupils are equal, round, and reactive to light. No scleral icterus.   Neck: No JVD present.   Cardiovascular: Normal rate and regular rhythm.    No murmur heard.  Pulmonary/Chest: Effort normal. No stridor. No  respiratory distress. She has wheezes.   Rhonchi b/l   Abdominal: Soft. Bowel sounds are normal. She exhibits no distension. There is no tenderness. There is no rebound.   Musculoskeletal: She exhibits edema. She exhibits no tenderness.   Neurological: She is alert and oriented to person, place, and time. No cranial nerve deficit.   Skin: Skin is warm and dry. She is not diaphoretic.   Psychiatric: She has a normal mood and affect. Her behavior is normal. Judgment and thought content normal.       Labs:        Invalid input(s): [id number]      Recent Labs      17   0300   SODIUM  136   POTASSIUM  5.2   CHLORIDE  106   CO2  21   BUN  27*   CREATININE  1.43*   MAGNESIUM  1.6   PHOSPHORUS  3.4   CALCIUM  7.9*     Recent Labs      17   0300   ALTSGPT  17   ASTSGOT  20   ALKPHOSPHAT  57   TBILIRUBIN  1.7*   GLUCOSE  301*     Recent Labs      17   0300   RBC  3.10*   HEMOGLOBIN  8.2*   HEMATOCRIT  28.6*   PLATELETCT  166     Recent Labs      17   0300   WBC  16.8*   NEUTSPOLYS  96.60*   LYMPHOCYTES  0.80*   MONOCYTES  1.60   EOSINOPHILS  0.00   BASOPHILS  0.20   ASTSGOT  20   ALTSGPT  17   ALKPHOSPHAT  57   TBILIRUBIN  1.7*           Hemodynamics:  Temp (24hrs), Av.8 °C (98.2 °F), Min:36.3 °C (97.3 °F), Max:37.4 °C (99.3 °F)  Temperature: 37.4 °C (99.3 °F)  Pulse  Av.4  Min: 33  Max: 97   Blood Pressure : 138/65 mmHg     Respiratory:    Respiration: 16, Pulse Oximetry: 91 %, O2 Daily Delivery Respiratory : Silicone Nasal Cannula     #MDI/DPI Given: MDI/DPI x 1, Work Of Breathing / Effort: Mild  RUL Breath Sounds: Expiratory Wheezes, RML Breath Sounds: Expiratory Wheezes, RLL Breath Sounds: Diminished, BIRD Breath Sounds: Expiratory Wheezes, LLL Breath Sounds: Diminished  Fluids:    Intake/Output Summary (Last 24 hours) at 17 1933  Last data filed at 17 1800   Gross per 24 hour   Intake    600 ml   Output      0 ml   Net    600 ml        GI/Nutrition:  Orders Placed This  Encounter   Procedures   • DIET ORDER     Standing Status: Standing      Number of Occurrences: 1      Standing Expiration Date:      Order Specific Question:  Diet:     Answer:  Diabetic [3]      Comments:  no milk     Order Specific Question:  Calorie modifications:     Answer:  1800 kcals [4]     Medical Decision Making, by Problem:  Active Hospital Problems    Diagnosis   • Acute on chronic respiratory failure with hypoxia   - POA. Persistent.   - Etiology: Shock, HCAP, COPD exacerbation   • Encephalopathy [G93.40]  - Acute, POA. Shock related. Resolved.    • Septic shock (CMS-HCC) [A41.9, R65.21]  - POA. Related to HCAP. Stabilized. Continue clinical monitoring.    • HCAP (healthcare-associated pneumonia) [J18.9]  - POA. Multifocal. Etiology: Unknown bug.   - De escalated to zosyn monotherapy.    • HAN (acute kidney injury) (CMS-HCC) [N17.9]  - POA. Shock related.   - Monitor renal function, avoid nephrotoxins, dose medications renally.    • Hyperbilirubinemia [E80.6]  - Acquired. Check LFTs in am. ? Shock related.    • COPD (chronic obstructive pulmonary disease) (CMS-HCC) [J44.9]  - With exacerbation, POA. Persistent now.   - Taper to PO Steroids  - Add doxycycline.   - Continue BD regimen.   - Smoking cessation reinforced with patient.    • Diabetes mellitus, type II (CMS-HCC) [E11.9]  - Uncontrolled. Manifestations neuropathy and nephropathy  - SSI / Lantus. Titrate to achieve normoglycemic control   • CKD (chronic kidney disease), stage II [N18.2]  - Monitor renal function, avoid nephrotoxins, dose medications renally   • HLD (hyperlipidemia) [E78.5]  - Simvastatin.    • Neuropathy (CMS-HCC) [G62.9]  - Gabapentin. Cymbalta.    • Leukocytosis [D72.829]  - Steroid related. Monitor.    • Anemia [D64.9]  - Obtain Iron studies, B12, folate   • Gastroesophageal reflux disease with esophagitis [K21.0]  - Holding PPI during hospital stay to limit C Diff risk   • Hypothyroidism [E03.9]  - Synthroid   •  Osteoarthritis [M19.90]  - PT/OT and weight loss.    • Insomnia [G47.00]  - Trazodone   • Major depressive disorder [F32.9]  - Duloxetine. Lexapro.    • HTN (hypertension) [I10]  - Lisinopril   • Obesity  - Body mass index is 37.05 kg/(m^2).   • Chronic pain [G89.29]  - With narcotic dependence. Continue baseline regimen.    = Disposition: Per PT/OT home with [facility] anticipated.     Labs reviewed, Medications reviewed and Radiology images reviewed  Mckeon catheter: No Mckeon      DVT Prophylaxis: Heparin  DVT prophylaxis - mechanical: SCDs  Ulcer prophylaxis: Not indicated  Antibiotics: Treating active infection/contamination beyond 24 hours perioperative coverage  Assessed for rehab: Patient was assess for and/or received rehabilitation services during this hospitalization

## 2017-04-06 LAB
ABO GROUP BLD: NORMAL
ANION GAP SERPL CALC-SCNC: 14 MMOL/L (ref 0–11.9)
BACTERIA BLD CULT: NORMAL
BACTERIA BLD CULT: NORMAL
BARCODED ABORH UBTYP: 9500
BARCODED PRD CODE UBPRD: NORMAL
BARCODED UNIT NUM UBUNT: NORMAL
BASOPHILS # BLD AUTO: 0.1 % (ref 0–1.8)
BASOPHILS # BLD: 0.01 K/UL (ref 0–0.12)
BLD GP AB SCN SERPL QL: NORMAL
BUN SERPL-MCNC: 40 MG/DL (ref 8–22)
C DIFF DNA SPEC QL NAA+PROBE: NEGATIVE
C DIFF TOX GENS STL QL NAA+PROBE: NEGATIVE
CALCIUM SERPL-MCNC: 8.9 MG/DL (ref 8.5–10.5)
CHLORIDE SERPL-SCNC: 108 MMOL/L (ref 96–112)
CO2 SERPL-SCNC: 22 MMOL/L (ref 20–33)
COMPONENT R 8504R: NORMAL
CREAT SERPL-MCNC: 1.57 MG/DL (ref 0.5–1.4)
EOSINOPHIL # BLD AUTO: 0.03 K/UL (ref 0–0.51)
EOSINOPHIL NFR BLD: 0.3 % (ref 0–6.9)
ERYTHROCYTE [DISTWIDTH] IN BLOOD BY AUTOMATED COUNT: 66.5 FL (ref 35.9–50)
ERYTHROCYTE [DISTWIDTH] IN BLOOD BY AUTOMATED COUNT: 67.6 FL (ref 35.9–50)
GFR SERPL CREATININE-BSD FRML MDRD: 33 ML/MIN/1.73 M 2
GLUCOSE BLD-MCNC: 102 MG/DL (ref 65–99)
GLUCOSE BLD-MCNC: 205 MG/DL (ref 65–99)
GLUCOSE BLD-MCNC: 248 MG/DL (ref 65–99)
GLUCOSE BLD-MCNC: 87 MG/DL (ref 65–99)
GLUCOSE SERPL-MCNC: 101 MG/DL (ref 65–99)
HCT VFR BLD AUTO: 23.1 % (ref 37–47)
HCT VFR BLD AUTO: 24.5 % (ref 37–47)
HGB BLD-MCNC: 6.7 G/DL (ref 12–16)
HGB BLD-MCNC: 7.3 G/DL (ref 12–16)
IMM GRANULOCYTES # BLD AUTO: 0.07 K/UL (ref 0–0.11)
IMM GRANULOCYTES NFR BLD AUTO: 0.6 % (ref 0–0.9)
LYMPHOCYTES # BLD AUTO: 1.46 K/UL (ref 1–4.8)
LYMPHOCYTES NFR BLD: 13 % (ref 22–41)
MCH RBC QN AUTO: 26.3 PG (ref 27–33)
MCH RBC QN AUTO: 26.4 PG (ref 27–33)
MCHC RBC AUTO-ENTMCNC: 29 G/DL (ref 33.6–35)
MCHC RBC AUTO-ENTMCNC: 29.8 G/DL (ref 33.6–35)
MCV RBC AUTO: 88.4 FL (ref 81.4–97.8)
MCV RBC AUTO: 90.6 FL (ref 81.4–97.8)
MONOCYTES # BLD AUTO: 0.61 K/UL (ref 0–0.85)
MONOCYTES NFR BLD AUTO: 5.4 % (ref 0–13.4)
NEUTROPHILS # BLD AUTO: 9.08 K/UL (ref 2–7.15)
NEUTROPHILS NFR BLD: 80.6 % (ref 44–72)
NRBC # BLD AUTO: 0.03 K/UL
NRBC BLD AUTO-RTO: 0.3 /100 WBC
PLATELET # BLD AUTO: 133 K/UL (ref 164–446)
PLATELET # BLD AUTO: 157 K/UL (ref 164–446)
PMV BLD AUTO: 8.9 FL (ref 9–12.9)
PMV BLD AUTO: 9.8 FL (ref 9–12.9)
POTASSIUM SERPL-SCNC: 4 MMOL/L (ref 3.6–5.5)
PRODUCT TYPE UPROD: NORMAL
RBC # BLD AUTO: 2.55 M/UL (ref 4.2–5.4)
RBC # BLD AUTO: 2.77 M/UL (ref 4.2–5.4)
RH BLD: NORMAL
SIGNIFICANT IND 70042: NORMAL
SIGNIFICANT IND 70042: NORMAL
SITE SITE: NORMAL
SITE SITE: NORMAL
SODIUM SERPL-SCNC: 144 MMOL/L (ref 135–145)
SOURCE SOURCE: NORMAL
SOURCE SOURCE: NORMAL
UNIT STATUS USTAT: NORMAL
WBC # BLD AUTO: 11.3 K/UL (ref 4.8–10.8)
WBC # BLD AUTO: 8.9 K/UL (ref 4.8–10.8)

## 2017-04-06 PROCEDURE — A9270 NON-COVERED ITEM OR SERVICE: HCPCS | Performed by: INTERNAL MEDICINE

## 2017-04-06 PROCEDURE — A9270 NON-COVERED ITEM OR SERVICE: HCPCS | Performed by: HOSPITALIST

## 2017-04-06 PROCEDURE — 85025 COMPLETE CBC W/AUTO DIFF WBC: CPT

## 2017-04-06 PROCEDURE — 700102 HCHG RX REV CODE 250 W/ 637 OVERRIDE(OP): Performed by: INTERNAL MEDICINE

## 2017-04-06 PROCEDURE — 36415 COLL VENOUS BLD VENIPUNCTURE: CPT

## 2017-04-06 PROCEDURE — 86900 BLOOD TYPING SEROLOGIC ABO: CPT

## 2017-04-06 PROCEDURE — 86901 BLOOD TYPING SEROLOGIC RH(D): CPT

## 2017-04-06 PROCEDURE — 85027 COMPLETE CBC AUTOMATED: CPT

## 2017-04-06 PROCEDURE — 770006 HCHG ROOM/CARE - MED/SURG/GYN SEMI*

## 2017-04-06 PROCEDURE — 86850 RBC ANTIBODY SCREEN: CPT

## 2017-04-06 PROCEDURE — 700102 HCHG RX REV CODE 250 W/ 637 OVERRIDE(OP): Performed by: HOSPITALIST

## 2017-04-06 PROCEDURE — 99232 SBSQ HOSP IP/OBS MODERATE 35: CPT | Performed by: INTERNAL MEDICINE

## 2017-04-06 PROCEDURE — 700111 HCHG RX REV CODE 636 W/ 250 OVERRIDE (IP): Performed by: INTERNAL MEDICINE

## 2017-04-06 PROCEDURE — 700105 HCHG RX REV CODE 258: Performed by: INTERNAL MEDICINE

## 2017-04-06 PROCEDURE — 82962 GLUCOSE BLOOD TEST: CPT | Mod: 91

## 2017-04-06 PROCEDURE — 80048 BASIC METABOLIC PNL TOTAL CA: CPT

## 2017-04-06 RX ORDER — FUROSEMIDE 10 MG/ML
20 INJECTION INTRAMUSCULAR; INTRAVENOUS
Status: DISPENSED | OUTPATIENT
Start: 2017-04-06 | End: 2017-04-07

## 2017-04-06 RX ORDER — PREDNISONE 20 MG/1
40 TABLET ORAL DAILY
Status: DISCONTINUED | OUTPATIENT
Start: 2017-04-06 | End: 2017-04-08

## 2017-04-06 RX ORDER — AMLODIPINE BESYLATE 10 MG/1
10 TABLET ORAL
Status: DISCONTINUED | OUTPATIENT
Start: 2017-04-06 | End: 2017-04-09 | Stop reason: HOSPADM

## 2017-04-06 RX ADMIN — DOXYCYCLINE 100 MG: 100 TABLET ORAL at 21:29

## 2017-04-06 RX ADMIN — TRAZODONE HYDROCHLORIDE 100 MG: 50 TABLET ORAL at 21:28

## 2017-04-06 RX ADMIN — GUAIFENESIN 600 MG: 600 TABLET, EXTENDED RELEASE ORAL at 09:19

## 2017-04-06 RX ADMIN — SIMVASTATIN 10 MG: 20 TABLET, FILM COATED ORAL at 21:28

## 2017-04-06 RX ADMIN — PIPERACILLIN AND TAZOBACTAM 3.38 G: 3; .375 INJECTION, POWDER, LYOPHILIZED, FOR SOLUTION INTRAVENOUS; PARENTERAL at 11:12

## 2017-04-06 RX ADMIN — BUDESONIDE AND FORMOTEROL FUMARATE DIHYDRATE 2 PUFF: 160; 4.5 AEROSOL RESPIRATORY (INHALATION) at 09:20

## 2017-04-06 RX ADMIN — CYCLOBENZAPRINE HYDROCHLORIDE 10 MG: 10 TABLET, FILM COATED ORAL at 11:06

## 2017-04-06 RX ADMIN — PIPERACILLIN AND TAZOBACTAM 3.38 G: 3; .375 INJECTION, POWDER, LYOPHILIZED, FOR SOLUTION INTRAVENOUS; PARENTERAL at 01:07

## 2017-04-06 RX ADMIN — PIPERACILLIN AND TAZOBACTAM 3.38 G: 3; .375 INJECTION, POWDER, LYOPHILIZED, FOR SOLUTION INTRAVENOUS; PARENTERAL at 05:44

## 2017-04-06 RX ADMIN — DOXYCYCLINE 100 MG: 100 TABLET ORAL at 09:18

## 2017-04-06 RX ADMIN — GABAPENTIN 600 MG: 300 CAPSULE ORAL at 09:19

## 2017-04-06 RX ADMIN — FAMOTIDINE 20 MG: 20 TABLET, FILM COATED ORAL at 09:20

## 2017-04-06 RX ADMIN — ESCITALOPRAM OXALATE 10 MG: 10 TABLET ORAL at 09:18

## 2017-04-06 RX ADMIN — PIPERACILLIN AND TAZOBACTAM 3.38 G: 3; .375 INJECTION, POWDER, LYOPHILIZED, FOR SOLUTION INTRAVENOUS; PARENTERAL at 23:47

## 2017-04-06 RX ADMIN — INSULIN GLARGINE 10 UNITS: 100 INJECTION, SOLUTION SUBCUTANEOUS at 21:33

## 2017-04-06 RX ADMIN — DULOXETINE HYDROCHLORIDE 60 MG: 30 CAPSULE, DELAYED RELEASE ORAL at 09:20

## 2017-04-06 RX ADMIN — AMLODIPINE BESYLATE 10 MG: 10 TABLET ORAL at 09:18

## 2017-04-06 RX ADMIN — FERROUS GLUCONATE 324 MG: 324 TABLET ORAL at 09:19

## 2017-04-06 RX ADMIN — CYCLOBENZAPRINE HYDROCHLORIDE 10 MG: 10 TABLET, FILM COATED ORAL at 21:31

## 2017-04-06 RX ADMIN — GABAPENTIN 600 MG: 300 CAPSULE ORAL at 13:45

## 2017-04-06 RX ADMIN — THERA TABS 1 TABLET: TAB at 09:19

## 2017-04-06 RX ADMIN — OXYCODONE HYDROCHLORIDE 10 MG: 10 TABLET ORAL at 13:46

## 2017-04-06 RX ADMIN — GABAPENTIN 600 MG: 300 CAPSULE ORAL at 21:28

## 2017-04-06 RX ADMIN — MONTELUKAST SODIUM 10 MG: 10 TABLET, FILM COATED ORAL at 09:18

## 2017-04-06 RX ADMIN — LEVOTHYROXINE SODIUM 75 MCG: 75 TABLET ORAL at 05:44

## 2017-04-06 RX ADMIN — GUAIFENESIN 600 MG: 600 TABLET, EXTENDED RELEASE ORAL at 21:29

## 2017-04-06 RX ADMIN — PIPERACILLIN AND TAZOBACTAM 3.38 G: 3; .375 INJECTION, POWDER, LYOPHILIZED, FOR SOLUTION INTRAVENOUS; PARENTERAL at 17:14

## 2017-04-06 RX ADMIN — PREDNISONE 40 MG: 20 TABLET ORAL at 09:18

## 2017-04-06 RX ADMIN — OXYCODONE HYDROCHLORIDE 10 MG: 10 TABLET ORAL at 21:31

## 2017-04-06 RX ADMIN — LISINOPRIL 40 MG: 20 TABLET ORAL at 09:19

## 2017-04-06 RX ADMIN — TIOTROPIUM BROMIDE 1 CAPSULE: 18 CAPSULE ORAL; RESPIRATORY (INHALATION) at 09:20

## 2017-04-06 RX ADMIN — BUDESONIDE AND FORMOTEROL FUMARATE DIHYDRATE 2 PUFF: 160; 4.5 AEROSOL RESPIRATORY (INHALATION) at 21:27

## 2017-04-06 ASSESSMENT — ENCOUNTER SYMPTOMS
DOUBLE VISION: 0
NERVOUS/ANXIOUS: 1
SPUTUM PRODUCTION: 1
MYALGIAS: 0
SENSORY CHANGE: 0
WEAKNESS: 1
ABDOMINAL PAIN: 0
DIARRHEA: 0
INSOMNIA: 1
VOMITING: 0
BLURRED VISION: 0
WHEEZING: 1
COUGH: 1
CHILLS: 0
CONSTIPATION: 0
BLOOD IN STOOL: 0
SHORTNESS OF BREATH: 1
DEPRESSION: 1
NAUSEA: 0
HEARTBURN: 0
SPEECH CHANGE: 0
DIZZINESS: 0
FOCAL WEAKNESS: 0
HEADACHES: 0
FEVER: 0

## 2017-04-06 ASSESSMENT — PAIN SCALES - GENERAL
PAINLEVEL_OUTOF10: 5
PAINLEVEL_OUTOF10: 5
PAINLEVEL_OUTOF10: 7
PAINLEVEL_OUTOF10: 7
PAINLEVEL_OUTOF10: 5

## 2017-04-06 NOTE — CARE PLAN
Problem: Pain Management  Goal: Pain level will decrease to patient’s comfort goal  Outcome: PROGRESSING AS EXPECTED    Problem: Psychosocial Needs:  Goal: Level of anxiety will decrease  Outcome: PROGRESSING AS EXPECTED

## 2017-04-06 NOTE — PROGRESS NOTES
This nurse spoke with night hospitalitis regarding elevated b/p no new oreres at this time. Will continue to monitor.

## 2017-04-06 NOTE — PROGRESS NOTES
Hospital Medicine Progress Note, Adult, Complex               Author: Norman Boyce Date & Time created: 4/6/2017  2:14 PM     Interval History:  66 y.o. female admitted 4/1/2017 with a CC of Found unresponsive and admitting dx of septic shock 2/2 HCAP.   Recently discharged with CAP.   Septic shock, copd exacerbation, HCAP, HAN on presentation. Required vasopressor support. Admitted to ICU.   Stabilized and transferred out of ICU.   This am she feels well. Some lethargy. Ongoing SOB, Sputum production and wheezing.   No new complaints. Wants to go home. Declining SNF even if advised. HHC is planned.   Discussed with nursing.   Hb low overnight. Patient declined PRBC.   Declined lasix ordered.     Review of Systems:  Review of Systems   Constitutional: Positive for malaise/fatigue. Negative for fever and chills.   HENT: Negative for hearing loss.    Eyes: Negative for blurred vision and double vision.   Respiratory: Positive for cough, sputum production, shortness of breath and wheezing.    Cardiovascular: Positive for leg swelling. Negative for chest pain.   Gastrointestinal: Negative for heartburn, nausea, vomiting, abdominal pain, diarrhea, constipation, blood in stool and melena.   Genitourinary: Negative for dysuria and urgency.   Musculoskeletal: Positive for joint pain (Chronic). Negative for myalgias.   Skin: Negative for itching and rash.   Neurological: Positive for weakness. Negative for dizziness, sensory change, speech change, focal weakness and headaches.   Psychiatric/Behavioral: Positive for depression. The patient is nervous/anxious and has insomnia.        Physical Exam:  Physical Exam   Constitutional: She is oriented to person, place, and time. No distress.   HENT:   Head: Normocephalic.   Mouth/Throat: Oropharynx is clear and moist. No oropharyngeal exudate.   Eyes: Conjunctivae are normal. Pupils are equal, round, and reactive to light. No scleral icterus.   Neck: No JVD present.    Cardiovascular: Normal rate and regular rhythm.    No murmur heard.  Pulmonary/Chest: Effort normal. No stridor. No respiratory distress. She has wheezes.   Rhonchi b/l   Abdominal: Soft. Bowel sounds are normal. She exhibits no distension. There is no tenderness. There is no rebound.   Musculoskeletal: She exhibits edema. She exhibits no tenderness.   Neurological: She is alert and oriented to person, place, and time. No cranial nerve deficit.   Skin: Skin is warm and dry. She is not diaphoretic.   Psychiatric: She has a normal mood and affect. Her behavior is normal. Judgment and thought content normal.       Labs:        Invalid input(s): [id number]      Recent Labs      17   SODIUM  135  144   POTASSIUM  4.0  4.0   CHLORIDE  107  108   CO2  21  22   BUN  41*  40*   CREATININE  1.54*  1.57*   MAGNESIUM  1.9   --    PHOSPHORUS  3.5   --    CALCIUM  8.8  8.9     Recent Labs      17   ALTSGPT  27   --    ASTSGOT  22   --    ALKPHOSPHAT  45   --    TBILIRUBIN  0.6   --    GLUCOSE  214*  101*     Recent Labs      17   0813  17   0958   RBC  2.46*   --   2.55*  2.77*   HEMOGLOBIN  6.6*  7.5*  6.7*  7.3*   HEMATOCRIT  21.8*   --   23.1*  24.5*   PLATELETCT  111*   --   133*  157*   IRON  36*   --    --    --    FERRITIN  125.1   --    --    --    TOTIRONBC  328   --    --    --      Recent Labs      17   0958   WBC  10.8  8.9  11.3*   NEUTSPOLYS  93.80*   --   80.60*   LYMPHOCYTES  2.60*   --   13.00*   MONOCYTES  2.70   --   5.40   EOSINOPHILS  0.00   --   0.30   BASOPHILS  0.00   --   0.10   ASTSGOT  22   --    --    ALTSGPT  27   --    --    ALKPHOSPHAT  45   --    --    TBILIRUBIN  0.6   --    --            Hemodynamics:  Temp (24hrs), Av.6 °C (97.9 °F), Min:36.4 °C (97.5 °F), Max:36.9 °C (98.5 °F)  Temperature: 36.5 °C (97.7 °F)  Pulse  Av.2  Min: 33  Max:  97   Blood Pressure : (!) 186/69 mmHg     Respiratory:    Respiration: 18, Pulse Oximetry: 93 %     Work Of Breathing / Effort: Moderate  RUL Breath Sounds: Expiratory Wheezes, RML Breath Sounds: Expiratory Wheezes;Inspiratory Wheezes, RLL Breath Sounds: Diminished, BIRD Breath Sounds: Expiratory Wheezes, LLL Breath Sounds: Diminished  Fluids:    Intake/Output Summary (Last 24 hours) at 04/06/17 1414  Last data filed at 04/06/17 0600   Gross per 24 hour   Intake   2020 ml   Output      0 ml   Net   2020 ml        GI/Nutrition:  Orders Placed This Encounter   Procedures   • DIET ORDER     Standing Status: Standing      Number of Occurrences: 1      Standing Expiration Date:      Order Specific Question:  Diet:     Answer:  Diabetic [3]      Comments:  no milk     Order Specific Question:  Calorie modifications:     Answer:  1800 kcals [4]     Medical Decision Making, by Problem:  Active Hospital Problems    Diagnosis   • Acute on chronic respiratory failure with hypoxia   - POA. Improved.   - Etiology: Shock, HCAP, COPD exacerbation   • Encephalopathy [G93.40]  - Acute, POA. Shock related. Resolved.    • Septic shock (CMS-HCC) [A41.9, R65.21]  - POA. Related to HCAP. Stabilized. Continue clinical monitoring.    • HCAP (healthcare-associated pneumonia) [J18.9]  - POA. Multifocal. Etiology: Unknown bug.   - De escalated to zosyn monotherapy. Aim seven days.   - Stop zosyn tomorrow. Once levaquin PO tomorrow and then anticipate discharge.    • HAN (acute kidney injury) (CMS-HCC) [N17.9]  - POA. Shock related.   - Monitor renal function, avoid nephrotoxins, dose medications renally.    • Hyperbilirubinemia [E80.6]  - Acquired. Shock related. Resolved.    • COPD (chronic obstructive pulmonary disease) (CMS-HCC) [J44.9]  - With exacerbation, POA. Persistent now.   - Taperred to PO Steroids  - Added doxycycline.   - Continue BD regimen.   - Smoking cessation reinforced with patient.    • Diabetes mellitus, type II (CMS-HCC)  [E11.9]  - Uncontrolled. Manifestations neuropathy and nephropathy  - SSI / Lantus. Titrate to achieve normoglycemic control   • CKD (chronic kidney disease), stage II [N18.2]  - Monitor renal function, avoid nephrotoxins, dose medications renally   • HLD (hyperlipidemia) [E78.5]  - Simvastatin.    • Neuropathy (CMS-HCC) [G62.9]  - Gabapentin. Cymbalta.    • Leukocytosis [D72.829]  - Steroid related. Monitor.    • Anemia [D64.9]  - AOCD  - Monitor Hb.   - Restrictive transfusion strategy  - Iron / MV support   • Gastroesophageal reflux disease with esophagitis [K21.0]  - Holding PPI during hospital stay to limit C Diff risk   • Hypothyroidism [E03.9]  - Synthroid   • Osteoarthritis [M19.90]  - PT/OT and weight loss.    • Insomnia [G47.00]  - Trazodone   • Major depressive disorder [F32.9]  - Duloxetine. Lexapro.    • HTN (hypertension) [I10]  - Lisinopril / Amlodipine   • Obesity  - Body mass index is 37.05 kg/(m^2).   • Chronic pain [G89.29]  - With narcotic dependence. Continue baseline regimen.    = Disposition: Per PT/OT home with [facility] anticipated.     Patient declining blood transfusion and lasix. Counseled and educated. Declined again. Discussed with nursing.     Labs reviewed, Medications reviewed and Radiology images reviewed  Mckeon catheter: No Mckeon      DVT Prophylaxis: Heparin  DVT prophylaxis - mechanical: SCDs  Ulcer prophylaxis: Not indicated  Antibiotics: Treating active infection/contamination beyond 24 hours perioperative coverage  Assessed for rehab: Patient was assess for and/or received rehabilitation services during this hospitalization

## 2017-04-06 NOTE — CARE PLAN
Problem: Infection  Goal: Will remain free from infection  Outcome: PROGRESSING AS EXPECTED  PT GETTING ANTIBIOTICS FOR INFECTION.     Problem: Pain Management  Goal: Pain level will decrease to patient’s comfort goal  Outcome: PROGRESSING AS EXPECTED    04/04/17 1200 04/05/17 1553 04/06/17 0930   OTHER   Nurse Pain Scale 0 - 10  --  --  --    Non Verbal Scale  --  --  Calm   Therapist Pain Assessment --  During Activity;Nurse Notified;4 --    Comfort Goal Comfort at Rest --  --      04/06/17 1345   OTHER   Nurse Pain Scale 0 - 10  7   Non Verbal Scale  --    Therapist Pain Assessment --    Comfort Goal --    PT AMBULATING AND REPOSITIONED FOR COMFORT. PT RECEIVING PAIN MEDICATIONS  Intervention: Educate and implement non-pharmacologic comfort measures. Examples: relaxation, distration, play therapy, activity therapy, massage, etc.    04/06/17 1628   OTHER   Intervention Medication (see MAR);Ambulation / Increased Activity;Rest;Education;Repositioned;Relaxation Technique

## 2017-04-06 NOTE — PROGRESS NOTES
Hospital Medicine Progress Note, Adult, Complex               Author: Norman Boyce Date & Time created: 4/5/2017  7:10 PM     Interval History:  66 y.o. female admitted 4/1/2017 with a CC of Found unresponsive and admitting dx of septic shock 2/2 HCAP.   Recently discharged with CAP.   Septic shock, copd exacerbation, HCAP, HAN on presentation. Required vasopressor support. Admitted to ICU.   Stabilized and transferred out of ICU.   This am she feels well. Some lethargy. Ongoing SOB, Sputum production and wheezing.   No new complaints. Wants to go home. Declining SNF even if advised. HHC is planned.   Discussed with nursing. Remove central line. Place peripheral IV access.     Review of Systems:  Review of Systems   Constitutional: Positive for malaise/fatigue. Negative for fever and chills.   HENT: Negative for hearing loss.    Eyes: Negative for blurred vision and double vision.   Respiratory: Positive for cough, sputum production, shortness of breath and wheezing.    Cardiovascular: Positive for leg swelling. Negative for chest pain.   Gastrointestinal: Negative for heartburn, nausea, vomiting, abdominal pain, diarrhea, constipation, blood in stool and melena.   Genitourinary: Negative for dysuria and urgency.   Musculoskeletal: Positive for joint pain (Chronic). Negative for myalgias.   Skin: Negative for itching and rash.   Neurological: Positive for weakness. Negative for dizziness, sensory change, speech change, focal weakness and headaches.   Psychiatric/Behavioral: Positive for depression. The patient is nervous/anxious and has insomnia.        Physical Exam:  Physical Exam   Constitutional: She is oriented to person, place, and time. No distress.   HENT:   Head: Normocephalic.   Mouth/Throat: Oropharynx is clear and moist. No oropharyngeal exudate.   Eyes: Conjunctivae are normal. Pupils are equal, round, and reactive to light. No scleral icterus.   Neck: No JVD present.   Cardiovascular: Normal rate and  regular rhythm.    No murmur heard.  Pulmonary/Chest: Effort normal. No stridor. No respiratory distress. She has wheezes.   Rhonchi b/l   Abdominal: Soft. Bowel sounds are normal. She exhibits no distension. There is no tenderness. There is no rebound.   Musculoskeletal: She exhibits edema. She exhibits no tenderness.   Neurological: She is alert and oriented to person, place, and time. No cranial nerve deficit.   Skin: Skin is warm and dry. She is not diaphoretic.   Psychiatric: She has a normal mood and affect. Her behavior is normal. Judgment and thought content normal.       Labs:        Invalid input(s): [id number]      Recent Labs      17   0200   SODIUM  135   POTASSIUM  4.0   CHLORIDE  107   CO2  21   BUN  41*   CREATININE  1.54*   MAGNESIUM  1.9   PHOSPHORUS  3.5   CALCIUM  8.8     Recent Labs      17   0200   ALTSGPT  27   ASTSGOT  22   ALKPHOSPHAT  45   TBILIRUBIN  0.6   GLUCOSE  214*     Recent Labs      17   0200  17   0813   RBC  2.46*   --    HEMOGLOBIN  6.6*  7.5*   HEMATOCRIT  21.8*   --    PLATELETCT  111*   --    IRON  36*   --    FERRITIN  125.1   --    TOTIRONBC  328   --      Recent Labs      17   0200   WBC  10.8   NEUTSPOLYS  93.80*   LYMPHOCYTES  2.60*   MONOCYTES  2.70   EOSINOPHILS  0.00   BASOPHILS  0.00   ASTSGOT  22   ALTSGPT  27   ALKPHOSPHAT  45   TBILIRUBIN  0.6           Hemodynamics:  Temp (24hrs), Av.6 °C (97.8 °F), Min:36.4 °C (97.5 °F), Max:36.8 °C (98.2 °F)  Temperature: 36.4 °C (97.5 °F)  Pulse  Av.3  Min: 33  Max: 97   Blood Pressure : (!) 163/92 mmHg (nurse notified)     Respiratory:    Respiration: 15, Pulse Oximetry: 96 %     Work Of Breathing / Effort: Mild  RUL Breath Sounds: Expiratory Wheezes, RML Breath Sounds: Expiratory Wheezes, RLL Breath Sounds: Diminished, BIRD Breath Sounds: Expiratory Wheezes, LLL Breath Sounds: Diminished  Fluids:    Intake/Output Summary (Last 24 hours) at 17 1910  Last data filed at 17  0900   Gross per 24 hour   Intake    360 ml   Output      0 ml   Net    360 ml        GI/Nutrition:  Orders Placed This Encounter   Procedures   • DIET ORDER     Standing Status: Standing      Number of Occurrences: 1      Standing Expiration Date:      Order Specific Question:  Diet:     Answer:  Diabetic [3]      Comments:  no milk     Order Specific Question:  Calorie modifications:     Answer:  1800 kcals [4]     Medical Decision Making, by Problem:  Active Hospital Problems    Diagnosis   • Acute on chronic respiratory failure with hypoxia   - POA. Persistent.   - Etiology: Shock, HCAP, COPD exacerbation   • Encephalopathy [G93.40]  - Acute, POA. Shock related. Resolved.    • Septic shock (CMS-HCC) [A41.9, R65.21]  - POA. Related to HCAP. Stabilized. Continue clinical monitoring.    • HCAP (healthcare-associated pneumonia) [J18.9]  - POA. Multifocal. Etiology: Unknown bug.   - De escalated to zosyn monotherapy. Aim seven days.    • HAN (acute kidney injury) (CMS-HCC) [N17.9]  - POA. Shock related.   - Monitor renal function, avoid nephrotoxins, dose medications renally.    • Hyperbilirubinemia [E80.6]  - Acquired. Shock related. Resolved.    • COPD (chronic obstructive pulmonary disease) (CMS-HCC) [J44.9]  - With exacerbation, POA. Persistent now.   - Taperred to PO Steroids  - Added doxycycline.   - Continue BD regimen.   - Smoking cessation reinforced with patient.    • Diabetes mellitus, type II (CMS-HCC) [E11.9]  - Uncontrolled. Manifestations neuropathy and nephropathy  - SSI / Lantus. Titrate to achieve normoglycemic control   • CKD (chronic kidney disease), stage II [N18.2]  - Monitor renal function, avoid nephrotoxins, dose medications renally   • HLD (hyperlipidemia) [E78.5]  - Simvastatin.    • Neuropathy (CMS-HCC) [G62.9]  - Gabapentin. Cymbalta.    • Leukocytosis [D72.829]  - Steroid related. Monitor.    • Anemia [D64.9]  - AOCD  - Monitor Hb.   - Restrictive transfusion strategy  - Iron / MV  support   • Gastroesophageal reflux disease with esophagitis [K21.0]  - Holding PPI during hospital stay to limit C Diff risk   • Hypothyroidism [E03.9]  - Synthroid   • Osteoarthritis [M19.90]  - PT/OT and weight loss.    • Insomnia [G47.00]  - Trazodone   • Major depressive disorder [F32.9]  - Duloxetine. Lexapro.    • HTN (hypertension) [I10]  - Lisinopril / Amlodipine   • Obesity  - Body mass index is 37.05 kg/(m^2).   • Chronic pain [G89.29]  - With narcotic dependence. Continue baseline regimen.    = Disposition: Per PT/OT home with [facility] anticipated.     Labs reviewed, Medications reviewed and Radiology images reviewed  Mckeon catheter: No Mckeon      DVT Prophylaxis: Heparin  DVT prophylaxis - mechanical: SCDs  Ulcer prophylaxis: Not indicated  Antibiotics: Treating active infection/contamination beyond 24 hours perioperative coverage  Assessed for rehab: Patient was assess for and/or received rehabilitation services during this hospitalization

## 2017-04-06 NOTE — PROGRESS NOTES
RECEIVED PT FROM NIGHT RN. ASSESSMENT COMPLETED. MEDICATIONS GIVEN. PT REFUSED BLOOD UNTIL REPEAT CBC DONE. HGB IN AM 6.7 REPEAT 7.3. ORDERS TO CANCEL BLOOD GIVEN. PT UP IN [place] WITH STAFF TOLERATED WELL. PT WITH LOOSE STOOLS DURING SHIFT. BRUISING NOTED. NO OTHER CONCERNS. C-DIFF NEGATIVE ISOLATION REMOVED

## 2017-04-06 NOTE — THERAPY
"Occupational Therapy Evaluation completed.   Functional Status:  Pt very pleasant & motivated.  Pt currently able to perform basic ADL's & functional mobility with SBA on 3L O2 N/C, however pt becomes very SOB with exertion.  Pt does live alone but reports she has help from friends & neighbors.  Plan of Care: Will benefit from Occupational Therapy 2 times per week  Discharge Recommendations:  Equipment: Will Continue to Assess for Equipment Needs. Post-acute therapy Discharge to home with outpatient or home health for additional skilled therapy services.    See \"Rehab Therapy-Acute\" Patient Summary Report for complete documentation.    "

## 2017-04-06 NOTE — PROGRESS NOTES
Received pt from night RN, medications were given and assessment was completed. Pt is A&O x4, c/o pain in lower back 5/10, given Flexeril 1100. Specimen sent for C Diff, came back negative last night. Pt is standby assist, tolerates well. On 3.5L O2 via nasal cannula. Pt with loose stools during shift, held Miralax. Hgb was 6.7 this AM, repeat was 7.3, orders to discontinue blood. Possible discharge tomorrow.  No other concerns at this time

## 2017-04-07 LAB
GLUCOSE BLD-MCNC: 111 MG/DL (ref 65–99)
GLUCOSE BLD-MCNC: 201 MG/DL (ref 65–99)
GLUCOSE BLD-MCNC: 306 MG/DL (ref 65–99)
GLUCOSE BLD-MCNC: 90 MG/DL (ref 65–99)

## 2017-04-07 PROCEDURE — 700105 HCHG RX REV CODE 258: Performed by: INTERNAL MEDICINE

## 2017-04-07 PROCEDURE — A9270 NON-COVERED ITEM OR SERVICE: HCPCS | Performed by: INTERNAL MEDICINE

## 2017-04-07 PROCEDURE — 770006 HCHG ROOM/CARE - MED/SURG/GYN SEMI*

## 2017-04-07 PROCEDURE — 700111 HCHG RX REV CODE 636 W/ 250 OVERRIDE (IP): Performed by: INTERNAL MEDICINE

## 2017-04-07 PROCEDURE — 700102 HCHG RX REV CODE 250 W/ 637 OVERRIDE(OP): Performed by: HOSPITALIST

## 2017-04-07 PROCEDURE — 700102 HCHG RX REV CODE 250 W/ 637 OVERRIDE(OP): Performed by: INTERNAL MEDICINE

## 2017-04-07 PROCEDURE — 99233 SBSQ HOSP IP/OBS HIGH 50: CPT | Performed by: INTERNAL MEDICINE

## 2017-04-07 PROCEDURE — A9270 NON-COVERED ITEM OR SERVICE: HCPCS | Performed by: HOSPITALIST

## 2017-04-07 PROCEDURE — 82962 GLUCOSE BLOOD TEST: CPT | Mod: 91

## 2017-04-07 PROCEDURE — 94760 N-INVAS EAR/PLS OXIMETRY 1: CPT

## 2017-04-07 RX ORDER — HYDROCHLOROTHIAZIDE 25 MG/1
25 TABLET ORAL
Status: DISCONTINUED | OUTPATIENT
Start: 2017-04-07 | End: 2017-04-09 | Stop reason: HOSPADM

## 2017-04-07 RX ORDER — HYDRALAZINE HYDROCHLORIDE 20 MG/ML
20 INJECTION INTRAMUSCULAR; INTRAVENOUS EVERY 6 HOURS PRN
Status: DISCONTINUED | OUTPATIENT
Start: 2017-04-07 | End: 2017-04-09 | Stop reason: HOSPADM

## 2017-04-07 RX ORDER — LOPERAMIDE HYDROCHLORIDE 2 MG/1
2 CAPSULE ORAL ONCE
Status: COMPLETED | OUTPATIENT
Start: 2017-04-07 | End: 2017-04-07

## 2017-04-07 RX ORDER — LEVOFLOXACIN 750 MG/1
750 TABLET, FILM COATED ORAL ONCE
Status: COMPLETED | OUTPATIENT
Start: 2017-04-07 | End: 2017-04-07

## 2017-04-07 RX ORDER — POTASSIUM CHLORIDE 20 MEQ/1
40 TABLET, EXTENDED RELEASE ORAL ONCE
Status: COMPLETED | OUTPATIENT
Start: 2017-04-07 | End: 2017-04-07

## 2017-04-07 RX ORDER — POTASSIUM CHLORIDE 20 MEQ/1
20 TABLET, EXTENDED RELEASE ORAL DAILY
Status: DISCONTINUED | OUTPATIENT
Start: 2017-04-07 | End: 2017-04-09 | Stop reason: HOSPADM

## 2017-04-07 RX ORDER — FUROSEMIDE 10 MG/ML
20 INJECTION INTRAMUSCULAR; INTRAVENOUS ONCE
Status: COMPLETED | OUTPATIENT
Start: 2017-04-07 | End: 2017-04-07

## 2017-04-07 RX ORDER — CARVEDILOL 3.12 MG/1
3.12 TABLET ORAL 2 TIMES DAILY WITH MEALS
Status: DISCONTINUED | OUTPATIENT
Start: 2017-04-07 | End: 2017-04-09 | Stop reason: HOSPADM

## 2017-04-07 RX ADMIN — DOXYCYCLINE 100 MG: 100 TABLET ORAL at 08:01

## 2017-04-07 RX ADMIN — PREDNISONE 40 MG: 20 TABLET ORAL at 08:00

## 2017-04-07 RX ADMIN — TIOTROPIUM BROMIDE 1 CAPSULE: 18 CAPSULE ORAL; RESPIRATORY (INHALATION) at 09:32

## 2017-04-07 RX ADMIN — CYCLOBENZAPRINE HYDROCHLORIDE 10 MG: 10 TABLET, FILM COATED ORAL at 14:55

## 2017-04-07 RX ADMIN — POTASSIUM CHLORIDE 40 MEQ: 1500 TABLET, EXTENDED RELEASE ORAL at 08:14

## 2017-04-07 RX ADMIN — THERA TABS 1 TABLET: TAB at 08:00

## 2017-04-07 RX ADMIN — OXYCODONE HYDROCHLORIDE 10 MG: 10 TABLET ORAL at 09:30

## 2017-04-07 RX ADMIN — OXYCODONE HYDROCHLORIDE 10 MG: 10 TABLET ORAL at 21:52

## 2017-04-07 RX ADMIN — GUAIFENESIN 600 MG: 600 TABLET, EXTENDED RELEASE ORAL at 21:27

## 2017-04-07 RX ADMIN — HYDROCHLOROTHIAZIDE 25 MG: 25 TABLET ORAL at 17:53

## 2017-04-07 RX ADMIN — BUDESONIDE AND FORMOTEROL FUMARATE DIHYDRATE 2 PUFF: 160; 4.5 AEROSOL RESPIRATORY (INHALATION) at 08:00

## 2017-04-07 RX ADMIN — FUROSEMIDE 20 MG: 10 INJECTION, SOLUTION INTRAVENOUS at 08:03

## 2017-04-07 RX ADMIN — TRAZODONE HYDROCHLORIDE 100 MG: 50 TABLET ORAL at 21:27

## 2017-04-07 RX ADMIN — LISINOPRIL 40 MG: 20 TABLET ORAL at 08:03

## 2017-04-07 RX ADMIN — DOXYCYCLINE 100 MG: 100 TABLET ORAL at 21:27

## 2017-04-07 RX ADMIN — DULOXETINE HYDROCHLORIDE 60 MG: 30 CAPSULE, DELAYED RELEASE ORAL at 08:00

## 2017-04-07 RX ADMIN — FAMOTIDINE 20 MG: 20 TABLET, FILM COATED ORAL at 08:01

## 2017-04-07 RX ADMIN — BUDESONIDE AND FORMOTEROL FUMARATE DIHYDRATE 2 PUFF: 160; 4.5 AEROSOL RESPIRATORY (INHALATION) at 21:40

## 2017-04-07 RX ADMIN — POTASSIUM CHLORIDE 20 MEQ: 1500 TABLET, EXTENDED RELEASE ORAL at 17:53

## 2017-04-07 RX ADMIN — OXYCODONE HYDROCHLORIDE 10 MG: 10 TABLET ORAL at 16:02

## 2017-04-07 RX ADMIN — GUAIFENESIN 600 MG: 600 TABLET, EXTENDED RELEASE ORAL at 08:01

## 2017-04-07 RX ADMIN — ESCITALOPRAM OXALATE 10 MG: 10 TABLET ORAL at 08:01

## 2017-04-07 RX ADMIN — CARVEDILOL 3.12 MG: 3.12 TABLET, FILM COATED ORAL at 17:53

## 2017-04-07 RX ADMIN — GABAPENTIN 600 MG: 300 CAPSULE ORAL at 21:27

## 2017-04-07 RX ADMIN — PIPERACILLIN AND TAZOBACTAM 3.38 G: 3; .375 INJECTION, POWDER, LYOPHILIZED, FOR SOLUTION INTRAVENOUS; PARENTERAL at 05:33

## 2017-04-07 RX ADMIN — CYCLOBENZAPRINE HYDROCHLORIDE 10 MG: 10 TABLET, FILM COATED ORAL at 23:44

## 2017-04-07 RX ADMIN — SIMVASTATIN 10 MG: 20 TABLET, FILM COATED ORAL at 21:27

## 2017-04-07 RX ADMIN — INSULIN GLARGINE 10 UNITS: 100 INJECTION, SOLUTION SUBCUTANEOUS at 21:27

## 2017-04-07 RX ADMIN — MONTELUKAST SODIUM 10 MG: 10 TABLET, FILM COATED ORAL at 08:02

## 2017-04-07 RX ADMIN — GABAPENTIN 600 MG: 300 CAPSULE ORAL at 08:00

## 2017-04-07 RX ADMIN — AMLODIPINE BESYLATE 10 MG: 10 TABLET ORAL at 09:00

## 2017-04-07 RX ADMIN — LEVOTHYROXINE SODIUM 75 MCG: 75 TABLET ORAL at 05:33

## 2017-04-07 RX ADMIN — LOPERAMIDE HYDROCHLORIDE 2 MG: 2 CAPSULE ORAL at 09:30

## 2017-04-07 RX ADMIN — LEVOFLOXACIN 750 MG: 750 TABLET, FILM COATED ORAL at 08:14

## 2017-04-07 RX ADMIN — GABAPENTIN 600 MG: 300 CAPSULE ORAL at 14:55

## 2017-04-07 ASSESSMENT — ENCOUNTER SYMPTOMS
CONSTIPATION: 0
SPUTUM PRODUCTION: 1
DOUBLE VISION: 0
WHEEZING: 1
HEADACHES: 0
COUGH: 1
WEAKNESS: 1
NAUSEA: 0
HEARTBURN: 0
BLOOD IN STOOL: 0
SENSORY CHANGE: 0
BLURRED VISION: 0
DIZZINESS: 0
FEVER: 0
DEPRESSION: 1
SPEECH CHANGE: 0
INSOMNIA: 1
NERVOUS/ANXIOUS: 1
MYALGIAS: 0
VOMITING: 0
CHILLS: 0
FOCAL WEAKNESS: 0
ABDOMINAL PAIN: 0
SHORTNESS OF BREATH: 1
DIARRHEA: 0

## 2017-04-07 ASSESSMENT — PAIN SCALES - GENERAL
PAINLEVEL_OUTOF10: 5
PAINLEVEL_OUTOF10: 5

## 2017-04-07 NOTE — CARE PLAN
Problem: Safety  Goal: Will remain free from falls  Bed in low and locked position, patient calls appropriately when in need of assistance.     Problem: Pain Management  Goal: Pain level will decrease to patient’s comfort goal  Pt reporting pain, medicated per MAR

## 2017-04-07 NOTE — PROGRESS NOTES
RECEIVED PT FROM NIGHT RN. ASSESSMENT COMPLETED MEDICATIONS WILL BE GIVEN. TALKED WITH HOSPITALIST RN ABOUT PT HAVING ELEVATED BP. PREVIOUS STATED MD AWARE AND NO PRN MEDICATIONS WILL BE ORDERED TO JUST MONITOR PT. RN WILL LET MD KNOW PRIOR TO DISCHARGE. PT ALERT AND ORIENTED TIMES 4. NO OTHER CONCERNS

## 2017-04-07 NOTE — PROGRESS NOTES
Received report at bedside and assumed care of patient at 1900. Pt resting comfortably in bed at this time. AxOx4, bed in low and locked position, call light within reach and used appropriately, non-slip socks on patient, hourly rounding in place. Pt in no signs of distress at this time.     Pt refusing bed alarm at this time despite education of fall risk and risk for injury, patient agrees to call appropriately.

## 2017-04-07 NOTE — FACE TO FACE
Face to Face Supporting Documentation - Home Health    The encounter with this patient was in whole or in part the primary reason for home health admission.    Date of encounter:   Patient:                    MRN:                       YOB: 2018  Priya Callahan  [id number]  1950     Home health to see patient for:  Skilled Nursing care for assessment, interventions & education, Physical Therapy evaluation and treatment, Occupational therapy evaluation and treatment, Registered dietitian consult, Medical social work consult and Home health aide    Skilled need for:  Exacerbation of Chronic Disease State COPD exacerbation. Needs medical educations. Ensuring compliance at home.     Skilled nursing interventions to include:  Comment: Monitoring of COPD, eduction regarding co morbid conditions, home based PT/OT    Homebound status evidenced by:  Need the aid of supportive devices such as crutches, canes, wheelchairs or walkers, Require the use of special transportation or Needs the assistance of another person in order to leave the home. Leaving home requires a considerable and taxing effort. There is a normal inability to leave the home.    Community Physician to provide follow up care: Mickie Lawson M.D.     Optional Interventions? No      I certify the face to face encounter for this home health care referral meets the CMS requirements and the encounter/clinical assessment with the patient was, in whole, or in part, for the medical condition(s) listed above, which is the primary reason for home health care. Based on my clinical findings: the service(s) are medically necessary, support the need for home health care, and the homebound criteria are met.  I certify that this patient has had a face to face encounter by myself.  Norman Boyce M.D. - NPI: [id number]

## 2017-04-08 LAB
ANION GAP SERPL CALC-SCNC: 7 MMOL/L (ref 0–11.9)
BUN SERPL-MCNC: 41 MG/DL (ref 8–22)
CALCIUM SERPL-MCNC: 8.5 MG/DL (ref 8.5–10.5)
CHLORIDE SERPL-SCNC: 107 MMOL/L (ref 96–112)
CO2 SERPL-SCNC: 24 MMOL/L (ref 20–33)
CREAT SERPL-MCNC: 1.4 MG/DL (ref 0.5–1.4)
GFR SERPL CREATININE-BSD FRML MDRD: 37 ML/MIN/1.73 M 2
GLUCOSE BLD-MCNC: 116 MG/DL (ref 65–99)
GLUCOSE BLD-MCNC: 264 MG/DL (ref 65–99)
GLUCOSE BLD-MCNC: 275 MG/DL (ref 65–99)
GLUCOSE BLD-MCNC: 67 MG/DL (ref 65–99)
GLUCOSE SERPL-MCNC: 83 MG/DL (ref 65–99)
HCT VFR BLD AUTO: 22 % (ref 37–47)
HGB BLD-MCNC: 6.6 G/DL (ref 12–16)
POTASSIUM SERPL-SCNC: 4.2 MMOL/L (ref 3.6–5.5)
SODIUM SERPL-SCNC: 138 MMOL/L (ref 135–145)

## 2017-04-08 PROCEDURE — 82962 GLUCOSE BLOOD TEST: CPT | Mod: 91

## 2017-04-08 PROCEDURE — 36415 COLL VENOUS BLD VENIPUNCTURE: CPT

## 2017-04-08 PROCEDURE — 99232 SBSQ HOSP IP/OBS MODERATE 35: CPT | Performed by: INTERNAL MEDICINE

## 2017-04-08 PROCEDURE — A9270 NON-COVERED ITEM OR SERVICE: HCPCS | Performed by: INTERNAL MEDICINE

## 2017-04-08 PROCEDURE — A9270 NON-COVERED ITEM OR SERVICE: HCPCS | Performed by: HOSPITALIST

## 2017-04-08 PROCEDURE — 770006 HCHG ROOM/CARE - MED/SURG/GYN SEMI*

## 2017-04-08 PROCEDURE — 700102 HCHG RX REV CODE 250 W/ 637 OVERRIDE(OP): Performed by: INTERNAL MEDICINE

## 2017-04-08 PROCEDURE — 86923 COMPATIBILITY TEST ELECTRIC: CPT

## 2017-04-08 PROCEDURE — 80048 BASIC METABOLIC PNL TOTAL CA: CPT

## 2017-04-08 PROCEDURE — 30243N1 TRANSFUSION OF NONAUTOLOGOUS RED BLOOD CELLS INTO CENTRAL VEIN, PERCUTANEOUS APPROACH: ICD-10-PCS | Performed by: INTERNAL MEDICINE

## 2017-04-08 PROCEDURE — 85018 HEMOGLOBIN: CPT

## 2017-04-08 PROCEDURE — 85014 HEMATOCRIT: CPT

## 2017-04-08 PROCEDURE — P9016 RBC LEUKOCYTES REDUCED: HCPCS

## 2017-04-08 PROCEDURE — 700102 HCHG RX REV CODE 250 W/ 637 OVERRIDE(OP): Performed by: HOSPITALIST

## 2017-04-08 PROCEDURE — 700111 HCHG RX REV CODE 636 W/ 250 OVERRIDE (IP): Performed by: INTERNAL MEDICINE

## 2017-04-08 PROCEDURE — 36430 TRANSFUSION BLD/BLD COMPNT: CPT

## 2017-04-08 RX ORDER — FUROSEMIDE 10 MG/ML
40 INJECTION INTRAMUSCULAR; INTRAVENOUS ONCE
Status: COMPLETED | OUTPATIENT
Start: 2017-04-08 | End: 2017-04-08

## 2017-04-08 RX ORDER — POTASSIUM CHLORIDE 20 MEQ/1
40 TABLET, EXTENDED RELEASE ORAL ONCE
Status: COMPLETED | OUTPATIENT
Start: 2017-04-08 | End: 2017-04-08

## 2017-04-08 RX ORDER — PREDNISONE 10 MG/1
30 TABLET ORAL DAILY
Status: DISCONTINUED | OUTPATIENT
Start: 2017-04-09 | End: 2017-04-09 | Stop reason: HOSPADM

## 2017-04-08 RX ADMIN — OXYCODONE HYDROCHLORIDE 10 MG: 10 TABLET ORAL at 22:01

## 2017-04-08 RX ADMIN — GABAPENTIN 600 MG: 300 CAPSULE ORAL at 19:44

## 2017-04-08 RX ADMIN — POTASSIUM CHLORIDE 40 MEQ: 1500 TABLET, EXTENDED RELEASE ORAL at 16:42

## 2017-04-08 RX ADMIN — GABAPENTIN 600 MG: 300 CAPSULE ORAL at 14:39

## 2017-04-08 RX ADMIN — DOXYCYCLINE 100 MG: 100 TABLET ORAL at 07:21

## 2017-04-08 RX ADMIN — FERROUS GLUCONATE 324 MG: 324 TABLET ORAL at 16:28

## 2017-04-08 RX ADMIN — BUDESONIDE AND FORMOTEROL FUMARATE DIHYDRATE 2 PUFF: 160; 4.5 AEROSOL RESPIRATORY (INHALATION) at 19:45

## 2017-04-08 RX ADMIN — DOXYCYCLINE 100 MG: 100 TABLET ORAL at 19:44

## 2017-04-08 RX ADMIN — ESCITALOPRAM OXALATE 10 MG: 10 TABLET ORAL at 07:21

## 2017-04-08 RX ADMIN — SIMVASTATIN 10 MG: 20 TABLET, FILM COATED ORAL at 19:44

## 2017-04-08 RX ADMIN — FUROSEMIDE 40 MG: 10 INJECTION, SOLUTION INTRAVENOUS at 10:51

## 2017-04-08 RX ADMIN — GUAIFENESIN 600 MG: 600 TABLET, EXTENDED RELEASE ORAL at 07:20

## 2017-04-08 RX ADMIN — INSULIN GLARGINE 10 UNITS: 100 INJECTION, SOLUTION SUBCUTANEOUS at 22:46

## 2017-04-08 RX ADMIN — PREDNISONE 40 MG: 20 TABLET ORAL at 07:20

## 2017-04-08 RX ADMIN — GUAIFENESIN 600 MG: 600 TABLET, EXTENDED RELEASE ORAL at 19:44

## 2017-04-08 RX ADMIN — CARVEDILOL 3.12 MG: 3.12 TABLET, FILM COATED ORAL at 07:21

## 2017-04-08 RX ADMIN — GABAPENTIN 600 MG: 300 CAPSULE ORAL at 07:28

## 2017-04-08 RX ADMIN — HYDRALAZINE HYDROCHLORIDE 20 MG: 20 INJECTION INTRAMUSCULAR; INTRAVENOUS at 16:42

## 2017-04-08 RX ADMIN — TIOTROPIUM BROMIDE 1 CAPSULE: 18 CAPSULE ORAL; RESPIRATORY (INHALATION) at 07:24

## 2017-04-08 RX ADMIN — AMLODIPINE BESYLATE 10 MG: 10 TABLET ORAL at 07:21

## 2017-04-08 RX ADMIN — THERA TABS 1 TABLET: TAB at 07:20

## 2017-04-08 RX ADMIN — TRAZODONE HYDROCHLORIDE 100 MG: 50 TABLET ORAL at 19:44

## 2017-04-08 RX ADMIN — BUDESONIDE AND FORMOTEROL FUMARATE DIHYDRATE 2 PUFF: 160; 4.5 AEROSOL RESPIRATORY (INHALATION) at 07:28

## 2017-04-08 RX ADMIN — FERROUS GLUCONATE 324 MG: 324 TABLET ORAL at 07:20

## 2017-04-08 RX ADMIN — POTASSIUM CHLORIDE 20 MEQ: 1500 TABLET, EXTENDED RELEASE ORAL at 07:21

## 2017-04-08 RX ADMIN — HYDROCHLOROTHIAZIDE 25 MG: 25 TABLET ORAL at 07:20

## 2017-04-08 RX ADMIN — CYCLOBENZAPRINE HYDROCHLORIDE 10 MG: 10 TABLET, FILM COATED ORAL at 14:39

## 2017-04-08 RX ADMIN — FAMOTIDINE 20 MG: 20 TABLET, FILM COATED ORAL at 07:21

## 2017-04-08 RX ADMIN — OXYCODONE HYDROCHLORIDE 10 MG: 10 TABLET ORAL at 09:40

## 2017-04-08 RX ADMIN — LEVOTHYROXINE SODIUM 75 MCG: 75 TABLET ORAL at 05:28

## 2017-04-08 RX ADMIN — OXYCODONE HYDROCHLORIDE AND ACETAMINOPHEN 500 MG: 500 TABLET ORAL at 07:21

## 2017-04-08 RX ADMIN — OXYCODONE HYDROCHLORIDE 10 MG: 10 TABLET ORAL at 16:03

## 2017-04-08 RX ADMIN — MONTELUKAST SODIUM 10 MG: 10 TABLET, FILM COATED ORAL at 07:20

## 2017-04-08 RX ADMIN — CARVEDILOL 3.12 MG: 3.12 TABLET, FILM COATED ORAL at 16:28

## 2017-04-08 RX ADMIN — DULOXETINE HYDROCHLORIDE 60 MG: 30 CAPSULE, DELAYED RELEASE ORAL at 07:20

## 2017-04-08 RX ADMIN — LISINOPRIL 40 MG: 20 TABLET ORAL at 07:21

## 2017-04-08 ASSESSMENT — ENCOUNTER SYMPTOMS
BLOOD IN STOOL: 0
FOCAL WEAKNESS: 0
DIARRHEA: 0
DIZZINESS: 0
VOMITING: 0
HEADACHES: 0
COUGH: 0
CONSTIPATION: 0
DEPRESSION: 1
HEARTBURN: 0
SHORTNESS OF BREATH: 0
DOUBLE VISION: 0
SPEECH CHANGE: 0
WEAKNESS: 1
SENSORY CHANGE: 0
CHILLS: 0
ABDOMINAL PAIN: 0
NAUSEA: 0
INSOMNIA: 1
SPUTUM PRODUCTION: 0
WHEEZING: 0
BLURRED VISION: 0
MYALGIAS: 0
FEVER: 0
NERVOUS/ANXIOUS: 1

## 2017-04-08 ASSESSMENT — PAIN SCALES - GENERAL
PAINLEVEL_OUTOF10: 3
PAINLEVEL_OUTOF10: 7
PAINLEVEL_OUTOF10: 0
PAINLEVEL_OUTOF10: 7
PAINLEVEL_OUTOF10: 0
PAINLEVEL_OUTOF10: 0

## 2017-04-08 NOTE — PROGRESS NOTES
Patient A&Ox4 this shift. Patient denies pain or the need for interventions. Patient denies sob or difficulty breathing. Patient sating 98% on 3.5 lpm of oxygen via nc; baseline. Lung sounds are diminished. Patient has generalized edema. Safety measures intact. Refusing bed alarm after further education from this RN. Patient up independently; gait steady. Hourly roudngin completed. No s/sx of distress this shift.    0755- New orders obtained to transfuse blood.  0810- Consent obtained for Blood. Pre transfusion vitals completed.   0812- Blood bank notified and blood released.  0818- IV infiltrated with KVO running. Blood returned to blood bank.  0830- SHITAL Vega using US for new IV access  0905- IV access obtained. Blood bank called to send blood.  0906- Pre Transfusion vitals completed.  0920- Blood running at 125mL/hr. Patient education provided regarding s/sx of transfusion reaction. Patient verbalizes possible understanding of education.   0935- 15 Minute vitals obtained.   0940- PRN Oxy IR 10mg administerd for 7/10 back and knee pain; see eMAR  1051- Lasix 40mg IV administered once half way thru infusion per active order  1216- Blood infusion completed. Post transfusion vitals completed. Pain reassessment. Patient denies pain at this time.   1440- PRN Flexeril administered for muscle spasms; see eMAR  1603- PRN Oxy IR administered for 7/10; see eMAR  1642- PRN Hydralazine administered for bp 179/83. Patient asymptomatic   1708- Repeat /74

## 2017-04-08 NOTE — DISCHARGE PLANNING
Medical Social Work    Per hospitalist RN, pt's family would like to have a meeting about d/c planning options (SNF in [place]) vs. Hospice care etc. SW is unable to complete family meetings on weekends due to limited staff. SW placed call to palliative care [phone] to request meeting. SW left message.

## 2017-04-08 NOTE — CARE PLAN
Problem: Discharge Barriers/Planning  Goal: Patient’s continuum of care needs will be met  Outcome: PROGRESSING AS EXPECTED    Problem: Respiratory:  Goal: Respiratory status will improve  Outcome: PROGRESSING AS EXPECTED

## 2017-04-08 NOTE — PROGRESS NOTES
Hospital Medicine Progress Note, Adult, Complex               Author: Norman Boyce Date & Time created: 4/8/2017  4:25 PM     Interval History:  66 y.o. female admitted 4/1/2017 with a CC of Found unresponsive and admitting dx of septic shock 2/2 HCAP.   Recently discharged with CAP.   Septic shock, copd exacerbation, HCAP, HAN on presentation. Required vasopressor support. Admitted to ICU.   Stabilized and transferred out of ICU.   This am she feels well. Some lethargy. Ongoing SOB, Sputum production and wheezing. This has improved over the last days.   No new complaints. Wants to go home. Declining SNF even if advised. HHC is planned.   Discussed with nursing.   Abx completed.   Uncontrolled HTN holding discharge. Believe she is hypervolemic. Recommended lasix. She agreed. Improved pressures currently.   Started HCTZ / Carvedilol.   Monitor BP.   Transfuse 1u PRBC today. Lasix with transfusion. Monitor post transfusion. DC in am tomorrow.     Review of Systems:  Review of Systems   Constitutional: Positive for malaise/fatigue. Negative for fever and chills.   HENT: Negative for hearing loss.    Eyes: Negative for blurred vision and double vision.   Respiratory: Negative for cough, sputum production, shortness of breath and wheezing.    Cardiovascular: Positive for leg swelling. Negative for chest pain.   Gastrointestinal: Negative for heartburn, nausea, vomiting, abdominal pain, diarrhea, constipation, blood in stool and melena.   Genitourinary: Negative for dysuria and urgency.   Musculoskeletal: Positive for joint pain (Chronic). Negative for myalgias.   Skin: Negative for itching and rash.   Neurological: Positive for weakness. Negative for dizziness, sensory change, speech change, focal weakness and headaches.   Psychiatric/Behavioral: Positive for depression. The patient is nervous/anxious and has insomnia.        Physical Exam:  Physical Exam   Constitutional: She is oriented to person, place, and time. No  distress.   HENT:   Head: Normocephalic.   Mouth/Throat: Oropharynx is clear and moist. No oropharyngeal exudate.   Eyes: Conjunctivae are normal. Pupils are equal, round, and reactive to light. No scleral icterus.   Neck: No JVD present.   Cardiovascular: Normal rate and regular rhythm.    No murmur heard.  Pulmonary/Chest: Effort normal. No stridor. No respiratory distress. She has no wheezes.   Rhonchi b/l   Abdominal: Soft. Bowel sounds are normal. She exhibits no distension. There is no tenderness. There is no rebound.   Musculoskeletal: She exhibits edema. She exhibits no tenderness.   Neurological: She is alert and oriented to person, place, and time. No cranial nerve deficit.   Skin: Skin is warm and dry. She is not diaphoretic.   Psychiatric: She has a normal mood and affect. Her behavior is normal. Judgment and thought content normal.       Labs:        Invalid input(s): [id number]      Recent Labs      17   0334  17   0320   SODIUM  144  138   POTASSIUM  4.0  4.2   CHLORIDE  108  107   CO2  22  24   BUN  40*  41*   CREATININE  1.57*  1.40   CALCIUM  8.9  8.5     Recent Labs      17   0334  17   0320   GLUCOSE  101*  83     Recent Labs      17   0334  17   0958  17   0320   RBC  2.55*  2.77*   --    HEMOGLOBIN  6.7*  7.3*  6.6*   HEMATOCRIT  23.1*  24.5*  22.0*   PLATELETCT  133*  157*   --      Recent Labs      17   0958   WBC  8.9  11.3*   NEUTSPOLYS   --   80.60*   LYMPHOCYTES   --   13.00*   MONOCYTES   --   5.40   EOSINOPHILS   --   0.30   BASOPHILS   --   0.10           Hemodynamics:  Temp (24hrs), Av.7 °C (98 °F), Min:36.3 °C (97.4 °F), Max:37.1 °C (98.7 °F)  Temperature: 36.5 °C (97.7 °F)  Pulse  Av.5  Min: 33  Max: 97   Blood Pressure : 149/66 mmHg     Respiratory:    Respiration: 16, Pulse Oximetry: 99 %, O2 Daily Delivery Respiratory : Silicone Nasal Cannula     Work Of Breathing / Effort: Mild  RUL Breath Sounds:  Diminished, RML Breath Sounds: Diminished, RLL Breath Sounds: Diminished, BIRD Breath Sounds: Diminished, LLL Breath Sounds: Diminished  Fluids:    Intake/Output Summary (Last 24 hours) at 04/08/17 1625  Last data filed at 04/07/17 2300   Gross per 24 hour   Intake    240 ml   Output      0 ml   Net    240 ml        GI/Nutrition:  Orders Placed This Encounter   Procedures   • DIET ORDER     Standing Status: Standing      Number of Occurrences: 1      Standing Expiration Date:      Order Specific Question:  Diet:     Answer:  Diabetic [3]      Comments:  no milk     Order Specific Question:  Diet:     Answer:  Cardiac [6]     Medical Decision Making, by Problem:  Active Hospital Problems    Diagnosis   • Acute on chronic respiratory failure with hypoxia   - POA. Improved.   - Etiology: Shock, HCAP, COPD exacerbation   • Encephalopathy [G93.40]  - Acute, POA. Shock related. Resolved.    • Septic shock (CMS-HCC) [A41.9, R65.21]  - POA. Related to HCAP. Stabilized. Continue clinical monitoring.    • HCAP (healthcare-associated pneumonia) [J18.9]  - POA. Multifocal. Etiology: Unknown bug.   - She has completed adequate therapy.    • HAN (acute kidney injury) (CMS-HCC) [N17.9]  - POA. Shock related.   - Monitor renal function, avoid nephrotoxins, dose medications renally.    • Hyperbilirubinemia [E80.6]  - Acquired. Shock related. Resolved.    • COPD (chronic obstructive pulmonary disease) (CMS-HCC) [J44.9]  - With exacerbation, POA. Improved now.   - Taperred to PO Steroids. Continue to wean steroids.   - Continue doxycycline.   - Continue BD regimen.   - Smoking cessation reinforced with patient.    • Diabetes mellitus, type II (CMS-HCC) [E11.9]  - Uncontrolled. Manifestations neuropathy and nephropathy  - SSI / Lantus. Titrate to achieve normoglycemic control   • CKD (chronic kidney disease), stage II [N18.2]  - Monitor renal function, avoid nephrotoxins, dose medications renally   • HLD (hyperlipidemia) [E78.5]  -  Simvastatin.    • Neuropathy (CMS-HCC) [G62.9]  - Gabapentin. Cymbalta.    • Leukocytosis [D72.829]  - Steroid related. Monitor.    • Anemia [D64.9]  - AOCD  - Monitor Hb.   - Restrictive transfusion strategy  - Iron / MV support  - Plan to transfuse 1unit today with lasix.    • Gastroesophageal reflux disease with esophagitis [K21.0]  - Holding PPI during hospital stay to limit C Diff risk   • Hypothyroidism [E03.9]  - Synthroid   • Osteoarthritis [M19.90]  - PT/OT and weight loss.    • Insomnia [G47.00]  - Trazodone   • Major depressive disorder [F32.9]  - Duloxetine. Lexapro.    • HTN (hypertension) [I10]  - Uncontrolled. Malignant.   - I believe this is likely 2/2 her underlying poor volume control.   - x 1 IV lasix, she agreed today with PRBC  - Started HCTZ. Added low dose carvedilol.   - Lisinopril / Amlodipine. Continue at current dosages.   - Improved currently.    • Obesity  - Body mass index is 37.05 kg/(m^2).   • Chronic pain [G89.29]  - With narcotic dependence. Continue baseline regimen.    = Disposition: Per PT/OT home with [facility] anticipated. Awaiting better control of her BP. Anticipated tomorrow.     Labs reviewed, Medications reviewed and Radiology images reviewed  Mckeon catheter: No Mckeon      DVT Prophylaxis: Heparin  DVT prophylaxis - mechanical: SCDs  Ulcer prophylaxis: Not indicated  Antibiotics: Treating active infection/contamination beyond 24 hours perioperative coverage  Assessed for rehab: Patient was assess for and/or received rehabilitation services during this hospitalization

## 2017-04-08 NOTE — PROGRESS NOTES
Hospital Medicine Progress Note, Adult, Complex               Author: Norman Boyce Date & Time created: 4/7/2017  5:44 PM     Interval History:  66 y.o. female admitted 4/1/2017 with a CC of Found unresponsive and admitting dx of septic shock 2/2 HCAP.   Recently discharged with CAP.   Septic shock, copd exacerbation, HCAP, HAN on presentation. Required vasopressor support. Admitted to ICU.   Stabilized and transferred out of ICU.   This am she feels well. Some lethargy. Ongoing SOB, Sputum production and wheezing. This has improved over the last days.   No new complaints. Wants to go home. Declining SNF even if advised. HHC is planned.   Discussed with nursing.   Abx completed.   Uncontrolled HTN holding discharge. Believe she is hypervolemic. Recommended lasix. She agreed.   Started HCTZ / Carvedilol.   Monitor BP.   Chemistries in am.     Review of Systems:  Review of Systems   Constitutional: Positive for malaise/fatigue. Negative for fever and chills.   HENT: Negative for hearing loss.    Eyes: Negative for blurred vision and double vision.   Respiratory: Positive for cough, sputum production, shortness of breath and wheezing.    Cardiovascular: Positive for leg swelling. Negative for chest pain.   Gastrointestinal: Negative for heartburn, nausea, vomiting, abdominal pain, diarrhea, constipation, blood in stool and melena.   Genitourinary: Negative for dysuria and urgency.   Musculoskeletal: Positive for joint pain (Chronic). Negative for myalgias.   Skin: Negative for itching and rash.   Neurological: Positive for weakness. Negative for dizziness, sensory change, speech change, focal weakness and headaches.   Psychiatric/Behavioral: Positive for depression. The patient is nervous/anxious and has insomnia.        Physical Exam:  Physical Exam   Constitutional: She is oriented to person, place, and time. No distress.   HENT:   Head: Normocephalic.   Mouth/Throat: Oropharynx is clear and moist. No oropharyngeal  exudate.   Eyes: Conjunctivae are normal. Pupils are equal, round, and reactive to light. No scleral icterus.   Neck: No JVD present.   Cardiovascular: Normal rate and regular rhythm.    No murmur heard.  Pulmonary/Chest: Effort normal. No stridor. No respiratory distress. She has wheezes.   Rhonchi b/l   Abdominal: Soft. Bowel sounds are normal. She exhibits no distension. There is no tenderness. There is no rebound.   Musculoskeletal: She exhibits edema. She exhibits no tenderness.   Neurological: She is alert and oriented to person, place, and time. No cranial nerve deficit.   Skin: Skin is warm and dry. She is not diaphoretic.   Psychiatric: She has a normal mood and affect. Her behavior is normal. Judgment and thought content normal.       Labs:        Invalid input(s): [id number]      Recent Labs      04/05/17 0200 04/06/17 0334   SODIUM  135  144   POTASSIUM  4.0  4.0   CHLORIDE  107  108   CO2  21  22   BUN  41*  40*   CREATININE  1.54*  1.57*   MAGNESIUM  1.9   --    PHOSPHORUS  3.5   --    CALCIUM  8.8  8.9     Recent Labs      04/05/17 0200 04/06/17 0334   ALTSGPT  27   --    ASTSGOT  22   --    ALKPHOSPHAT  45   --    TBILIRUBIN  0.6   --    GLUCOSE  214*  101*     Recent Labs      04/05/17 0200 04/05/17   0813  04/06/17 0334 04/06/17   0958   RBC  2.46*   --   2.55*  2.77*   HEMOGLOBIN  6.6*  7.5*  6.7*  7.3*   HEMATOCRIT  21.8*   --   23.1*  24.5*   PLATELETCT  111*   --   133*  157*   IRON  36*   --    --    --    FERRITIN  125.1   --    --    --    TOTIRONBC  328   --    --    --      Recent Labs      04/05/17 0200 04/06/17 0334 04/06/17   0958   WBC  10.8  8.9  11.3*   NEUTSPOLYS  93.80*   --   80.60*   LYMPHOCYTES  2.60*   --   13.00*   MONOCYTES  2.70   --   5.40   EOSINOPHILS  0.00   --   0.30   BASOPHILS  0.00   --   0.10   ASTSGOT  22   --    --    ALTSGPT  27   --    --    ALKPHOSPHAT  45   --    --    TBILIRUBIN  0.6   --    --            Hemodynamics:  Temp (24hrs),  Av.8 °C (98.2 °F), Min:36.7 °C (98 °F), Max:37 °C (98.6 °F)  Temperature: 36.7 °C (98.1 °F)  Pulse  Av  Min: 33  Max: 97   Blood Pressure : (!) 193/74 mmHg     Respiratory:    Respiration: 16, Pulse Oximetry: 95 %, O2 Daily Delivery Respiratory : Silicone Nasal Cannula     Work Of Breathing / Effort: Moderate  RUL Breath Sounds: Expiratory Wheezes, RML Breath Sounds: Expiratory Wheezes, RLL Breath Sounds: Diminished, BIRD Breath Sounds: Expiratory Wheezes, LLL Breath Sounds: Diminished  Fluids:    Intake/Output Summary (Last 24 hours) at 17 1744  Last data filed at 17 0600   Gross per 24 hour   Intake    700 ml   Output      0 ml   Net    700 ml        GI/Nutrition:  Orders Placed This Encounter   Procedures   • DIET ORDER     Standing Status: Standing      Number of Occurrences: 1      Standing Expiration Date:      Order Specific Question:  Diet:     Answer:  Diabetic [3]      Comments:  no milk     Order Specific Question:  Diet:     Answer:  Cardiac [6]     Medical Decision Making, by Problem:  Active Hospital Problems    Diagnosis   • Acute on chronic respiratory failure with hypoxia   - POA. Improved.   - Etiology: Shock, HCAP, COPD exacerbation   • Encephalopathy [G93.40]  - Acute, POA. Shock related. Resolved.    • Septic shock (CMS-HCC) [A41.9, R65.21]  - POA. Related to HCAP. Stabilized. Continue clinical monitoring.    • HCAP (healthcare-associated pneumonia) [J18.9]  - POA. Multifocal. Etiology: Unknown bug.   - X 1 dose levaquin today and then stop abx therapy.   - She has completed adequate therapy.    • HAN (acute kidney injury) (CMS-HCC) [N17.9]  - POA. Shock related.   - Monitor renal function, avoid nephrotoxins, dose medications renally.    • Hyperbilirubinemia [E80.6]  - Acquired. Shock related. Resolved.    • COPD (chronic obstructive pulmonary disease) (CMS-HCC) [J44.9]  - With exacerbation, POA. Persistent now.   - Taperred to PO Steroids  - Added doxycycline.   - Continue  BD regimen.   - Smoking cessation reinforced with patient.    • Diabetes mellitus, type II (CMS-HCC) [E11.9]  - Uncontrolled. Manifestations neuropathy and nephropathy  - SSI / Lantus. Titrate to achieve normoglycemic control   • CKD (chronic kidney disease), stage II [N18.2]  - Monitor renal function, avoid nephrotoxins, dose medications renally   • HLD (hyperlipidemia) [E78.5]  - Simvastatin.    • Neuropathy (CMS-HCC) [G62.9]  - Gabapentin. Cymbalta.    • Leukocytosis [D72.829]  - Steroid related. Monitor.    • Anemia [D64.9]  - AOCD  - Monitor Hb.   - Restrictive transfusion strategy  - Iron / MV support   • Gastroesophageal reflux disease with esophagitis [K21.0]  - Holding PPI during hospital stay to limit C Diff risk   • Hypothyroidism [E03.9]  - Synthroid   • Osteoarthritis [M19.90]  - PT/OT and weight loss.    • Insomnia [G47.00]  - Trazodone   • Major depressive disorder [F32.9]  - Duloxetine. Lexapro.    • HTN (hypertension) [I10]  - Uncontrolled. Malignant.   - I believe this is likely 2/2 her underlying poor volume control.   - x 1 IV lasix, she agreed today.   - Start HCTZ. Add low dose carvedilol.   - Lisinopril / Amlodipine. Continue at current dosages.    • Obesity  - Body mass index is 37.05 kg/(m^2).   • Chronic pain [G89.29]  - With narcotic dependence. Continue baseline regimen.    = Disposition: Per PT/OT home with [facility] anticipated. Awaiting better control of her BP.     POC discussed in detail with the patient. All questions / concerns answered.     Time spend: 40 minutes. > 50 % time spend providing counseling / co ordination of care. FTF is 35 minutes.       Labs reviewed, Medications reviewed and Radiology images reviewed  Mckeon catheter: No Mckeon      DVT Prophylaxis: Heparin  DVT prophylaxis - mechanical: SCDs  Ulcer prophylaxis: Not indicated  Antibiotics: Treating active infection/contamination beyond 24 hours perioperative coverage  Assessed for rehab: Patient was assess for and/or  received rehabilitation services during this hospitalization

## 2017-04-08 NOTE — CARE PLAN
Problem: Safety  Goal: Will remain free from injury  Outcome: PROGRESSING AS EXPECTED  Patient did not have a fall this shift. Safety measures intact. Refusing bed alarm after further education from this RN    Problem: Bowel/Gastric:  Goal: Normal bowel function is maintained or improved  Outcome: PROGRESSING AS EXPECTED  Patient last BM 4/8/17

## 2017-04-09 VITALS
HEIGHT: 66 IN | RESPIRATION RATE: 17 BRPM | TEMPERATURE: 97.2 F | BODY MASS INDEX: 36.14 KG/M2 | OXYGEN SATURATION: 96 % | SYSTOLIC BLOOD PRESSURE: 153 MMHG | DIASTOLIC BLOOD PRESSURE: 64 MMHG | WEIGHT: 224.87 LBS | HEART RATE: 55 BPM

## 2017-04-09 LAB
ANION GAP SERPL CALC-SCNC: 6 MMOL/L (ref 0–11.9)
BUN SERPL-MCNC: 42 MG/DL (ref 8–22)
CALCIUM SERPL-MCNC: 8.5 MG/DL (ref 8.5–10.5)
CHLORIDE SERPL-SCNC: 105 MMOL/L (ref 96–112)
CO2 SERPL-SCNC: 27 MMOL/L (ref 20–33)
CREAT SERPL-MCNC: 1.38 MG/DL (ref 0.5–1.4)
GFR SERPL CREATININE-BSD FRML MDRD: 38 ML/MIN/1.73 M 2
GLUCOSE BLD-MCNC: 113 MG/DL (ref 65–99)
GLUCOSE BLD-MCNC: 189 MG/DL (ref 65–99)
GLUCOSE SERPL-MCNC: 172 MG/DL (ref 65–99)
HCT VFR BLD AUTO: 25.5 % (ref 37–47)
HGB BLD-MCNC: 8 G/DL (ref 12–16)
POTASSIUM SERPL-SCNC: 4.4 MMOL/L (ref 3.6–5.5)
SODIUM SERPL-SCNC: 138 MMOL/L (ref 135–145)

## 2017-04-09 PROCEDURE — A9270 NON-COVERED ITEM OR SERVICE: HCPCS | Performed by: INTERNAL MEDICINE

## 2017-04-09 PROCEDURE — 85014 HEMATOCRIT: CPT

## 2017-04-09 PROCEDURE — 85018 HEMOGLOBIN: CPT

## 2017-04-09 PROCEDURE — 700102 HCHG RX REV CODE 250 W/ 637 OVERRIDE(OP): Performed by: INTERNAL MEDICINE

## 2017-04-09 PROCEDURE — 99239 HOSP IP/OBS DSCHRG MGMT >30: CPT | Performed by: INTERNAL MEDICINE

## 2017-04-09 PROCEDURE — 700111 HCHG RX REV CODE 636 W/ 250 OVERRIDE (IP): Performed by: INTERNAL MEDICINE

## 2017-04-09 PROCEDURE — 700102 HCHG RX REV CODE 250 W/ 637 OVERRIDE(OP): Performed by: HOSPITALIST

## 2017-04-09 PROCEDURE — A9270 NON-COVERED ITEM OR SERVICE: HCPCS | Performed by: HOSPITALIST

## 2017-04-09 PROCEDURE — 82962 GLUCOSE BLOOD TEST: CPT | Mod: 91

## 2017-04-09 PROCEDURE — 36415 COLL VENOUS BLD VENIPUNCTURE: CPT

## 2017-04-09 PROCEDURE — 80048 BASIC METABOLIC PNL TOTAL CA: CPT

## 2017-04-09 RX ORDER — CARVEDILOL 3.12 MG/1
3.12 TABLET ORAL 2 TIMES DAILY WITH MEALS
Qty: 60 TAB | Refills: 0 | Status: ON HOLD | OUTPATIENT
Start: 2017-04-09 | End: 2017-05-26

## 2017-04-09 RX ORDER — AMLODIPINE BESYLATE 10 MG/1
10 TABLET ORAL DAILY
Qty: 30 TAB | Refills: 0 | Status: SHIPPED | OUTPATIENT
Start: 2017-04-09 | End: 2017-07-12 | Stop reason: SDUPTHER

## 2017-04-09 RX ORDER — FERROUS GLUCONATE 324(38)MG
324 TABLET ORAL 2 TIMES DAILY WITH MEALS
Qty: 60 TAB | Refills: 0 | Status: ON HOLD | OUTPATIENT
Start: 2017-04-09 | End: 2017-06-22

## 2017-04-09 RX ORDER — HYDROCHLOROTHIAZIDE 25 MG/1
25 TABLET ORAL DAILY
Qty: 30 TAB | Refills: 0 | Status: ON HOLD | OUTPATIENT
Start: 2017-04-09 | End: 2017-05-03

## 2017-04-09 RX ORDER — POTASSIUM CHLORIDE 20 MEQ/1
20 TABLET, EXTENDED RELEASE ORAL DAILY
Qty: 30 TAB | Refills: 0 | Status: ON HOLD | OUTPATIENT
Start: 2017-04-09 | End: 2017-05-10

## 2017-04-09 RX ORDER — PREDNISONE 10 MG/1
TABLET ORAL
Qty: 12 TAB | Refills: 0 | Status: ON HOLD | OUTPATIENT
Start: 2017-04-09 | End: 2017-04-25

## 2017-04-09 RX ORDER — LISINOPRIL 40 MG/1
40 TABLET ORAL DAILY
Qty: 30 TAB | Refills: 0 | Status: ON HOLD | OUTPATIENT
Start: 2017-04-09 | End: 2017-05-03

## 2017-04-09 RX ORDER — ASCORBIC ACID 500 MG
500 TABLET ORAL
Qty: 90 TAB | Refills: 0 | Status: ON HOLD | OUTPATIENT
Start: 2017-04-09 | End: 2017-04-25

## 2017-04-09 RX ADMIN — DOXYCYCLINE 100 MG: 100 TABLET ORAL at 07:36

## 2017-04-09 RX ADMIN — DULOXETINE HYDROCHLORIDE 60 MG: 30 CAPSULE, DELAYED RELEASE ORAL at 07:34

## 2017-04-09 RX ADMIN — LISINOPRIL 40 MG: 20 TABLET ORAL at 07:35

## 2017-04-09 RX ADMIN — HYDROCHLOROTHIAZIDE 25 MG: 25 TABLET ORAL at 07:35

## 2017-04-09 RX ADMIN — ESCITALOPRAM OXALATE 10 MG: 10 TABLET ORAL at 07:35

## 2017-04-09 RX ADMIN — POTASSIUM CHLORIDE 20 MEQ: 1500 TABLET, EXTENDED RELEASE ORAL at 07:36

## 2017-04-09 RX ADMIN — FERROUS GLUCONATE 324 MG: 324 TABLET ORAL at 07:34

## 2017-04-09 RX ADMIN — LEVOTHYROXINE SODIUM 75 MCG: 75 TABLET ORAL at 06:01

## 2017-04-09 RX ADMIN — CYCLOBENZAPRINE HYDROCHLORIDE 10 MG: 10 TABLET, FILM COATED ORAL at 00:32

## 2017-04-09 RX ADMIN — OXYCODONE HYDROCHLORIDE 10 MG: 10 TABLET ORAL at 10:45

## 2017-04-09 RX ADMIN — GUAIFENESIN 600 MG: 600 TABLET, EXTENDED RELEASE ORAL at 07:36

## 2017-04-09 RX ADMIN — GABAPENTIN 600 MG: 300 CAPSULE ORAL at 07:35

## 2017-04-09 RX ADMIN — MONTELUKAST SODIUM 10 MG: 10 TABLET, FILM COATED ORAL at 07:35

## 2017-04-09 RX ADMIN — THERA TABS 1 TABLET: TAB at 07:35

## 2017-04-09 RX ADMIN — BUDESONIDE AND FORMOTEROL FUMARATE DIHYDRATE 2 PUFF: 160; 4.5 AEROSOL RESPIRATORY (INHALATION) at 07:42

## 2017-04-09 RX ADMIN — FAMOTIDINE 20 MG: 20 TABLET, FILM COATED ORAL at 07:35

## 2017-04-09 RX ADMIN — AMLODIPINE BESYLATE 10 MG: 10 TABLET ORAL at 07:35

## 2017-04-09 RX ADMIN — TIOTROPIUM BROMIDE 1 CAPSULE: 18 CAPSULE ORAL; RESPIRATORY (INHALATION) at 07:43

## 2017-04-09 RX ADMIN — PREDNISONE 30 MG: 10 TABLET ORAL at 07:37

## 2017-04-09 RX ADMIN — CARVEDILOL 3.12 MG: 3.12 TABLET, FILM COATED ORAL at 07:36

## 2017-04-09 ASSESSMENT — PAIN SCALES - GENERAL: PAINLEVEL_OUTOF10: 3

## 2017-04-09 NOTE — DISCHARGE INSTRUCTIONS
Things to follow up after discharge:   1) Follow up with hospital discharge clinic or PCP within one week of hospital discharge.   2) You completed treatment of pneumonia during hospital stay. COPD exacerbation was noted. You need to be on prednisone 30 mg once daily (Three pills) for two days, Then 20 mg once daily (Two pills) for two days and then 10 mg once daily (one pill) for two days. Take as prescribed. In addition continue breathing treatments at home as needed. Continue home oxygen and continue the use of your inhalers. You will need lung function testing and follow up with lung doctors in four weeks of hospital discharge.   3) You have elevated blood pressures. New medications have started including the use of HCTZ (water pill), Amlodipine and carvedilol. Take as prescribed. Monitor blood pressures daily. Write results in a diary and share with your providers on follow up. Given you are started on the water pill HCTZ take potassium as prescribed. In one week time your primary care provider or hospital discharge clinic needs to monitor your kidney function and electrolyte levels. Discuss with them please.   4) Continue diabetic regimen at home. Monitor blood sugars at home. Write results in a diary. Share with hospital discharge clinic / PCP on follow up.   5) You have low blood counts. You needed a blood transfusion during hospital stay. Take Iron and multivitamins as prescribed. PCP needs to monitor counts in one month of hospital discharge and consider further evaluation and management as indicated.    Discharge Instructions    Discharged to home by car with relative. Discharged via wheelchair, hospital escort: Yes.  Special equipment needed: Not Applicable    Be sure to schedule a follow-up appointment with your primary care doctor or any specialists as instructed.     Discharge Plan:   Diet Plan: Discussed  Activity Level: Discussed  Smoking Cessation Offered: Patient Refused  Confirmed Follow up  Appointment: Appointment Scheduled  Confirmed Symptoms Management: Discussed  Medication Reconciliation Updated: Yes  Influenza Vaccine Indication: Not indicated: Previously immunized this influenza season and > 8 years of age    I understand that a diet low in cholesterol, fat, and sodium is recommended for good health. Unless I have been given specific instructions below for another diet, I accept this instruction as my diet prescription.   Other diet: Diabetic    Special Instructions: None    · Is patient discharged on Warfarin / Coumadin?   No     · Is patient Post Blood Transfusion?  Yes  POST BLOOD TRANSFUSION INFORMATION (ADULT)    The purpose of blood transfusion is to correct anemia, low levels of blood clotting factors or to correct acute blood loss.   Blood transfusion is very safe but occasionally unexpected adverse reactions do occur. Most adverse reactions occur during transfusion, within one to two days following transfusion or one to two weeks following transfusion. Some adverse reactions can occur one to six months after transfusion and some even years later.             If any of the symptoms listed below happen to you during your transfusion,                                 please notify your nurse immediately.   · Fever or Chills  · Flushing of the Face  · Hives, rash or itching  · Difficulty in breathing or shortness of breath  · Pain or oozing of blood from the IV needle site  · Low back pain  · Nausea or vomiting  · Weakness or fainting  · Chest pain  · Blood in the urine  · Decreased frequency of urination    The above symptoms may also occur within 24 to 48 after transfusion; if so, notify your physician.     · Yellowing of the skin    This can happen one to six months after transfusion; if so, notify your physician    Amlodipine tablets  What is this medicine?  AMLODIPINE (am DEWEY di peen) is a calcium-channel blocker. It affects the amount of calcium found in your heart and muscle cells.  This relaxes your blood vessels, which can reduce the amount of work the heart has to do. This medicine is used to lower high blood pressure. It is also used to prevent chest pain.  This medicine may be used for other purposes; ask your health care provider or pharmacist if you have questions.  COMMON BRAND NAME(S): Norvasc  What should I tell my health care provider before I take this medicine?  They need to know if you have any of these conditions:  -heart problems like heart failure or aortic stenosis  -liver disease  -an unusual or allergic reaction to amlodipine, other medicines, foods, dyes, or preservatives  -pregnant or trying to get pregnant  -breast-feeding  How should I use this medicine?  Take this medicine by mouth with a glass of water. Follow the directions on the prescription label. Take your medicine at regular intervals. Do not take more medicine than directed.  Talk to your pediatrician regarding the use of this medicine in children. Special care may be needed. This medicine has been used in children as young as 6.  Persons over 65 years old may have a stronger reaction to this medicine and need smaller doses.  Overdosage: If you think you have taken too much of this medicine contact a poison control center or emergency room at once.  NOTE: This medicine is only for you. Do not share this medicine with others.  What if I miss a dose?  If you miss a dose, take it as soon as you can. If it is almost time for your next dose, take only that dose. Do not take double or extra doses.  What may interact with this medicine?  -herbal or dietary supplements  -local or general anesthetics  -medicines for high blood pressure  -medicines for prostate problems  -rifampin  This list may not describe all possible interactions. Give your health care provider a list of all the medicines, herbs, non-prescription drugs, or dietary supplements you use. Also tell them if you smoke, drink alcohol, or use illegal drugs.  Some items may interact with your medicine.  What should I watch for while using this medicine?  Visit your doctor or health care professional for regular check ups. Check your blood pressure and pulse rate regularly. Ask your health care professional what your blood pressure and pulse rate should be, and when you should contact him or her.  This medicine may make you feel confused, dizzy or lightheaded. Do not drive, use machinery, or do anything that needs mental alertness until you know how this medicine affects you. To reduce the risk of dizzy or fainting spells, do not sit or stand up quickly, especially if you are an older patient. Avoid alcoholic drinks; they can make you more dizzy.  Do not suddenly stop taking amlodipine. Ask your doctor or health care professional how you can gradually reduce the dose.  What side effects may I notice from receiving this medicine?  Side effects that you should report to your doctor or health care professional as soon as possible:  -allergic reactions like skin rash, itching or hives, swelling of the face, lips, or tongue  -breathing problems  -changes in vision or hearing  -chest pain  -fast, irregular heartbeat  -swelling of legs or ankles  Side effects that usually do not require medical attention (report to your doctor or health care professional if they continue or are bothersome):  -dry mouth  -facial flushing  -nausea, vomiting  -stomach gas, pain  -tired, weak  -trouble sleeping  This list may not describe all possible side effects. Call your doctor for medical advice about side effects. You may report side effects to FDA at 5-622-TNN-7286.  Where should I keep my medicine?  Keep out of the reach of children.  Store at room temperature between 59 and 86 degrees F (15 and 30 degrees C). Protect from light. Keep container tightly closed. Throw away any unused medicine after the expiration date.  NOTE: This sheet is a summary. It may not cover all possible information.  If you have questions about this medicine, talk to your doctor, pharmacist, or health care provider.  © 2014, [employer]. (11/15/2013 11:40:58 AM)    Carvedilol tablets  What is this medicine?  CARVEDILOL (SHIRLEY ve dil ol) is a beta-blocker. Beta-blockers reduce the workload on the heart and help it to beat more regularly. This medicine is used to treat high blood pressure and heart failure.  This medicine may be used for other purposes; ask your health care provider or pharmacist if you have questions.  COMMON BRAND NAME(S): Coreg  What should I tell my health care provider before I take this medicine?  They need to know if you have any of these conditions:  -circulation problems  -diabetes  -history of heart attack or heart disease  -liver disease  -lung or breathing disease, like asthma or emphysema  -pheochromocytoma  -slow or irregular heartbeat  -thyroid disease  -an unusual or allergic reaction to carvedilol, other beta-blockers, medicines, foods, dyes, or preservatives  -pregnant or trying to get pregnant  -breast-feeding  How should I use this medicine?  Take this medicine by mouth with a glass of water. Follow the directions on the prescription label. It is best to take the tablets with food. Take your doses at regular intervals. Do not take your medicine more often than directed. Do not stop taking except on the advice of your doctor or health care professional.  Talk to your pediatrician regarding the use of this medicine in children. Special care may be needed.  Overdosage: If you think you have taken too much of this medicine contact a poison control center or emergency room at once.  NOTE: This medicine is only for you. Do not share this medicine with others.  What if I miss a dose?  If you miss a dose, take it as soon as you can. If it is almost time for your next dose, take only that dose. Do not take double or extra doses.  What may interact with this medicine?  Do not take this medicine  with any of the following medications:  -sotalol  This medicine may also interact with the following medications:  -cimetidine  -clonidine  -cyclosporine  -digoxin  -MAOIs like Carbex, Eldepryl, Marplan, Nardil, and Parnate  -medicines for blood pressure, heart disease, irregular heart beat  -medicines for depression like fluoxetine and paroxetine  -medicines for diabetes  -medicines to control heart rhythm like propafenone and quinidine  -reserpine  -rifampin  This list may not describe all possible interactions. Give your health care provider a list of all the medicines, herbs, non-prescription drugs, or dietary supplements you use. Also tell them if you smoke, drink alcohol, or use illegal drugs. Some items may interact with your medicine.  What should I watch for while using this medicine?  Check your heart rate and blood pressure regularly while you are taking this medicine. Ask your doctor or health care professional what your heart rate and blood pressure should be, and when you should contact him or her. Do not stop taking this medicine suddenly. This could lead to serious heart-related effects.  Contact your doctor or health care professional if you have difficulty breathing while taking this drug.  Check your weight daily. Ask your doctor or health care professional when you should notify him/her of any weight gain.  You may get drowsy or dizzy. Do not drive, use machinery, or do anything that requires mental alertness until you know how this medicine affects you. To reduce the risk of dizzy or fainting spells, do not sit or stand up quickly. Alcohol can make you more drowsy, and increase flushing and rapid heartbeats. Avoid alcoholic drinks.  If you have diabetes, check your blood sugar as directed. Tell your doctor if you have changes in your blood sugar while you are taking this medicine.  If you are going to have surgery, tell your doctor or health care professional that you are taking this  medicine.  What side effects may I notice from receiving this medicine?  Side effects that you should report to your doctor or health care professional as soon as possible:  -allergic reactions like skin rash, itching or hives, swelling of the face, lips, or tongue  -breathing problems  -dark urine  -irregular heartbeat  -swollen legs or ankles  -vomiting  -yellowing of the eyes or skin  Side effects that usually do not require medical attention (report to your doctor or health care professional if they continue or are bothersome):  -change in sex drive or performance  -diarrhea  -dry eyes (especially if wearing contact lenses)  -dry, itching skin  -headache  -nausea  -unusually tired  This list may not describe all possible side effects. Call your doctor for medical advice about side effects. You may report side effects to FDA at 4-717-FDA-0814.  Where should I keep my medicine?  Keep out of the reach of children.  Store at room temperature below 30 degrees C (86 degrees F). Protect from moisture. Keep container tightly closed. Throw away any unused medicine after the expiration date.  NOTE: This sheet is a summary. It may not cover all possible information. If you have questions about this medicine, talk to your doctor, pharmacist, or health care provider.  © 2014, [employer]. (3/12/2009 2:39:22 PM)    Hydrochlorothiazide, HCTZ capsules or tablets  What is this medicine?  HYDROCHLOROTHIAZIDE (odilia droe klor oh THYE a zide) is a diuretic. It increases the amount of urine passed, which causes the body to lose salt and water. This medicine is used to treat high blood pressure. It is also reduces the swelling and water retention caused by various medical conditions, such as heart, liver, or kidney disease.  This medicine may be used for other purposes; ask your health care provider or pharmacist if you have questions.  COMMON BRAND NAME(S): Esidrix, Ezide, HydroDIURIL, Microzide, Oretic, Zide  What should I  tell my health care provider before I take this medicine?  They need to know if you have any of these conditions:  -diabetes  -gout  -immune system problems, like lupus  -kidney disease or kidney stones  -liver disease  -pancreatitis  -small amount of urine or difficulty passing urine  -an unusual or allergic reaction to hydrochlorothiazide, sulfa drugs, other medicines, foods, dyes, or preservatives  -pregnant or trying to get pregnant  -breast-feeding  How should I use this medicine?  Take this medicine by mouth with a glass of water. Follow the directions on the prescription label. Take your medicine at regular intervals. Remember that you will need to pass urine frequently after taking this medicine. Do not take your doses at a time of day that will cause you problems. Do not stop taking your medicine unless your doctor tells you to.  Talk to your pediatrician regarding the use of this medicine in children. Special care may be needed.  Overdosage: If you think you have taken too much of this medicine contact a poison control center or emergency room at once.  NOTE: This medicine is only for you. Do not share this medicine with others.  What if I miss a dose?  If you miss a dose, take it as soon as you can. If it is almost time for your next dose, take only that dose. Do not take double or extra doses.  What may interact with this medicine?  -cholestyramine  -colestipol  -digoxin  -dofetilide  -lithium  -medicines for blood pressure  -medicines for diabetes  -medicines that relax muscles for surgery  -other diuretics  -steroid medicines like prednisone or cortisone  This list may not describe all possible interactions. Give your health care provider a list of all the medicines, herbs, non-prescription drugs, or dietary supplements you use. Also tell them if you smoke, drink alcohol, or use illegal drugs. Some items may interact with your medicine.  What should I watch for while using this medicine?  Visit your  doctor or health care professional for regular checks on your progress. Check your blood pressure as directed. Ask your doctor or health care professional what your blood pressure should be and when you should contact him or her.  You may need to be on a special diet while taking this medicine. Ask your doctor.  Check with your doctor or health care professional if you get an attack of severe diarrhea, nausea and vomiting, or if you sweat a lot. The loss of too much body fluid can make it dangerous for you to take this medicine.  You may get drowsy or dizzy. Do not drive, use machinery, or do anything that needs mental alertness until you know how this medicine affects you. Do not stand or sit up quickly, especially if you are an older patient. This reduces the risk of dizzy or fainting spells. Alcohol may interfere with the effect of this medicine. Avoid alcoholic drinks.  This medicine may affect your blood sugar level. If you have diabetes, check with your doctor or health care professional before changing the dose of your diabetic medicine.  This medicine can make you more sensitive to the sun. Keep out of the sun. If you cannot avoid being in the sun, wear protective clothing and use sunscreen. Do not use sun lamps or tanning beds/booths.  What side effects may I notice from receiving this medicine?  Side effects that you should report to your doctor or health care professional as soon as possible:  -allergic reactions such as skin rash or itching, hives, swelling of the lips, mouth, tongue, or throat  -changes in vision  -chest pain  -eye pain  -fast or irregular heartbeat  -feeling faint or lightheaded, falls  -gout attack  -muscle pain or cramps  -pain or difficulty when passing urine  -pain, tingling, numbness in the hands or feet  -redness, blistering, peeling or loosening of the skin, including inside the mouth  -unusually weak or tired  Side effects that usually do not require medical attention (report  to your doctor or health care professional if they continue or are bothersome):  -change in sex drive or performance  -dry mouth  -headache  -stomach upset  This list may not describe all possible side effects. Call your doctor for medical advice about side effects. You may report side effects to FDA at 2-202-OTQ-1298.  Where should I keep my medicine?  Keep out of the reach of children.  Store at room temperature between 15 and 30 degrees C (59 and 86 degrees F). Do not freeze. Protect from light and moisture. Keep container closed tightly. Throw away any unused medicine after the expiration date.  NOTE: This sheet is a summary. It may not cover all possible information. If you have questions about this medicine, talk to your doctor, pharmacist, or health care provider.  © 2014, [employer]. (8/12/2011 12:57:37 PM)      Depression / Suicide Risk    As you are discharged from this [facility], it is important to learn how to keep safe from harming yourself.    Recognize the warning signs:  · Abrupt changes in personality, positive or negative- including increase in energy   · Giving away possessions  · Change in eating patterns- significant weight changes-  positive or negative  · Change in sleeping patterns- unable to sleep or sleeping all the time   · Unwillingness or inability to communicate  · Depression  · Unusual sadness, discouragement and loneliness  · Talk of wanting to die  · Neglect of personal appearance   · Rebelliousness- reckless behavior  · Withdrawal from people/activities they love  · Confusion- inability to concentrate     If you or a loved one observes any of these behaviors or has concerns about self-harm, here's what you can do:  · Talk about it- your feelings and reasons for harming yourself  · Remove any means that you might use to hurt yourself (examples: pills, rope, extension cords, firearm)  · Get professional help from the community (Mental Health, Substance Abuse,  psychological counseling)  · Do not be alone:Call your Safe Contact- someone whom you trust who will be there for you.  · Call your local CRISIS HOTLINE 005-3506 or 481-788-3367  · Call your local Children's Mobile Crisis Response Team [place] (795) 029-4257 or www.[employer]  · Call the toll free National Suicide Prevention Hotlines   · National Suicide Prevention Lifeline 848-272-IFMG (9114)  · National Hope Line Network 800-SUICIDE (930-1769)

## 2017-04-09 NOTE — CARE PLAN
Problem: Safety  Goal: Will remain free from injury  Outcome: PROGRESSING AS EXPECTED    Problem: Bowel/Gastric:  Goal: Will not experience complications related to bowel motility  Outcome: PROGRESSING AS EXPECTED

## 2017-04-09 NOTE — PROGRESS NOTES
Assumed care at this time. Report received from [facility] w[facility]. Pt up ambulating self, a/o x4, steady on feet. No acute distress noted, respirations even and unlabored. Side rails up x2, nonslip footwear on, bed locked/ placed in lowest position, call light within reach, hourly rounding in place. Will continue to monitor.

## 2017-04-09 NOTE — DISCHARGE SUMMARY
C O N F I D E N T I A L I N F O R M A T I O N   -------------------------------------------------------------------------------------------------------------------   DISCHARGE SUMMARY    Patient ID:  Priya Callahan  [id number]  66 y.o.female  1950    Admit date: 4/1/2017    Discharge date and time: 04/09/2017    Admitting Physician: Norman Boyce M.D.    Discharge Physician: Norman Boyce    CODE STATUS: FULL CODE    Admission Diagnoses: COPD exacerbation (CMS-HCC) [J44.1]    Discharge Diagnoses:   1) Septic shock (CMS-HCC), POA  2) Acute on chronic respiratory failure with hypoxia and hypercapnia (CMS-HCC), POA. Related to shock, copd exacerbation and HCAP  3) Encephalopathy, POA 2/2 shock  4) COPD (chronic obstructive pulmonary disease) (CMS-HCC), with exacerbation POA  5) HCAP (healthcare-associated pneumonia), POA. Multifocal. Etiology bacterial, unknown bug.   6) HAN (acute kidney injury) (CMS-HCC), POA. Shock related.   7) Hyperbilirubinemia, POA. Shock related.   8) Obesity (CMS-HCC), Body mass index is 36.84 kg/(m^2).  9) Chronic pain  10) Major depressive disorder  11) HTN (hypertension), uncontrolled  12) Osteoarthritis  13) Insomnia  14) Hypothyroidism  15) Gastroesophageal reflux disease with esophagitis  16) Anemia, AOCD  17) Diabetes mellitus, type II (CMS-HCC), Uncontrolled. Manifestations neuropathy and nephropathy  18) CKD (chronic kidney disease), stage II  19) HLD (hyperlipidemia)  20) Neuropathy (CMS-HCC), Diabetic  21) Steroid related leukocytosis      Admission Condition: critical    Discharged Condition: good    Indication for Admission: Septic shock    HPI: This is a 66-year-old female who has been brought to the emergency room after she was found down by her friends.  Upon evaluation by me, the patient informed me that she is at the [facility], but she is unaware of the month or year.  She does tell me that Nile Stroud is the president of [place].  She does engage in the  history of presenting illness, but is easily distracted.  Upon review of electronic medical record system, it appears that patient was admitted recently to the hospital on March 8th, 2017, when she was admitted in critical condition to the intensive care unit with septic shock secondary to community-acquired pneumonia coupled with COPD exacerbation.  She was discharged from the hospital on the March 17.  At that point in time, some functional deficits were noted and post-acute placement was recommended, but the patient declined this and wanted to go home.  I am uncertain if patient established outpatient followup after her discharge.  The patient was visited by her friends today who found the patient unresponsive on the floor being confused and subsequently, the patient was brought into the emergency room for further evaluation.  Upon evaluation by me, the patient is coughing extensively.  She denies having any headaches.  When questioned by me if she was having any neck pain, she declined this, though neck pain was reported as a chief complaint upon presentation of this patient to the emergency room.  She denies having any neck stiffness.  She denies any problems with range of motion of her neck. She denies any photophobia.  The patient does complain of cough which is productive of sputum.  When questioned regarding the color of the sputum or any blood in the sputum, she is unable to answer this.  She reports that she does use oxygen at home.  When questioned regarding the amount of oxygen she uses, again she is unable to answer this.  Otherwise, the patient denies having any chest pain.  She denies any abdominal pain.  She denies any diarrhea, constipation, blood in bowel movements or melena.  She denies any hematemesis or hemoptysis as such.  She denies any dysuria, frequency, urgency, or hematuria.  The patient denies any lower extremity swelling.  She denies any orthopnea or paroxysmal nocturnal  "dyspnea\".    Hospital Course: This is a 66 years old female who presented to the ER with above HPI. On presentation she was noted to be in septic shock requiring placement of CVC / Arterial line and administration of vasopressors with close monitoring in the ICU. Source of shock was presumed HCAP though a microbiological evidence could not be obtained during the hospital stay. In addition she was noted to be in respiratory failure 2/2 COPD exacerbation coupled with shock like state and HCAP. Given her encephalopathy on presentation CT of the head was obtained and found to be negative. CXRAY revealed diffuse b/l infiltrates consistent with multifocal HCAP. DVT duplex study was found to be negative and hence limited concern for DVT which could have lead to PE. During her last hospital stay she did have a TTE completed which revealed pEF with grade II DD. Moderate pulmonary HTN was also noted on this study. Patient was admitted to the ICU. She was weaned off vasopressors. Brought spectrum antibiotics were continued. She improved and was de escalated to medical RNF. She was appropriately treated with steroids and BD regimen for underlying COPD exacerbation. She completed the required duration of treatment for HCAP during her hospital stay. Her COPD exacerbation has stabilized. She is back to her home O2 needs prior to discharge home. During her hospital stay after she stabilized we encountered malignant / uncontrolled HTN. For this she has been initiated on amlodipine, HCTZ and carvedilol in addition to her home dose of lisinopril. Also anemia was noted requiring 1u PRBC during hospital stay. MV / Iron support has been initiated. She was slightly fluid up and diuresis was provided with control in her fluid status prior to discharge. Patient is currently back to her baseline. She is eager to be discharged home. She again has declined post acute placement. As an alternative C has been arranged and case discussed with " SW. Case discussed with hospital schedulers prior to discharge to arrange f/u with hospital discharge clinic and pulmonology in outpatient clinic. She does not have any further acute inpatient needs at this time. She is being discharged home in stable condition. Discharge planning has been discussed in detail with the patient. She will maintain close follow up with her outpatient providers.     Things to follow up after discharge:   1) Follow up with hospital discharge clinic or PCP within one week of hospital discharge.   2) You completed treatment of pneumonia during hospital stay. COPD exacerbation was noted. You need to be on prednisone 30 mg once daily (Three pills) for two days, Then 20 mg once daily (Two pills) for two days and then 10 mg once daily (one pill) for two days. Take as prescribed. In addition continue breathing treatments at home as needed. Continue home oxygen and continue the use of your inhalers. You will need lung function testing and follow up with lung doctors in four weeks of hospital discharge.   3) You have elevated blood pressures. New medications have started including the use of HCTZ (water pill), Amlodipine and carvedilol. Take as prescribed. Monitor blood pressures daily. Write results in a diary and share with your providers on follow up. Given you are started on the water pill HCTZ take potassium as prescribed. In one week time your primary care provider or hospital discharge clinic needs to monitor your kidney function and electrolyte levels. Discuss with them please.   4) Continue diabetic regimen at home. Monitor blood sugars at home. Write results in a diary. Share with hospital discharge clinic / PCP on follow up.   5) You have low blood counts. You needed a blood transfusion during hospital stay. Take Iron and multivitamins as prescribed. PCP needs to monitor counts in one month of hospital discharge and consider further evaluation and management as indicated.     Consults:  none    Significant Studies:   LE VENOUS DUPLEX - DVT ([facility] Only)   Final Result      DX-CHEST-LIMITED (1 VIEW)   Final Result      Interval placement of a right IJ central line without evidence of complication.      DX-CHEST-PORTABLE (1 VIEW)   Final Result      1.  Diffuse bilateral pulmonary infiltrates.      2.  Cardiomegaly.      CT-HEAD W/O   Final Result      No evidence of acute intracranial process.          Procedures Performed While Hospitalized: Central venous access placement and arterial line placement    Operations During Hospitalization: None    Disposition: Home with home health care    Patient Instructions: Given  Activity: activity as tolerated  Diet: cardiac diet and diabetic diet  Wound Care: none needed    Medications at discharge:   Priya Callahan   Home Medication Instructions JAY:[id number]    Printed on:04/09/17 0906   Medication Information                      albuterol (PROVENTIL) 2.5mg/3ml Nebu Soln solution for nebulization  3 mL by Nebulization route every four hours as needed for Shortness of Breath.             albuterol-ipratropium (COMBIVENT)  MCG/ACT Aerosol  Inhale 2 Puffs by mouth every 6 hours as needed for Shortness of Breath.             amlodipine (NORVASC) 10 MG Tab  Take 1 Tab by mouth every day.             ascorbic acid (VITAMIN C) 500 MG tablet  Take 1 Tab by mouth 3 times a day, with meals.             carvedilol (COREG) 3.125 MG Tab  Take 1 Tab by mouth 2 times a day, with meals.             cyclobenzaprine (FLEXERIL) 10 MG Tab  TAKE 1 TABLET BY MOUTH THREE TIMES DAILY AS NEEDED FOR MILD PAIN             duloxetine (CYMBALTA) 60 MG Cap DR Particles delayed-release capsule  Take 60 mg by mouth every day.             escitalopram (LEXAPRO) 10 MG Tab  Take 1 Tab by mouth every day.             ferrous gluconate (FERGON) 324 (38 FE) MG Tab  Take 1 Tab by mouth 2 times a day, with meals.             fluticasone-salmeterol (ADVAIR)  500-50 MCG/DOSE AEROSOL POWDER, BREATH ACTIVATED  Inhale 1 Puff by mouth every 12 hours.             gabapentin (NEURONTIN) 600 MG tablet  Take 1 Tab by mouth 3 times a day.             hydrochlorothiazide (HYDRODIURIL) 25 MG Tab  Take 1 Tab by mouth every day.             insulin glargine (LANTUS) 100 UNIT/ML Solution  Inject 20 Units as instructed every evening.             ipratropium-albuterol (COMBIVENT RESPIMAT)  MCG/ACT Aero Soln  Inhale 1 Puff by mouth 4 times a day.             lactulose 10 GM/15ML Solution  Take 30 mL by mouth 2 times a day as needed (constipation).             levothyroxine (SYNTHROID) 75 MCG Tab  TAKE 1 TABLET BY MOUTH DAILY             lisinopril (PRINIVIL, ZESTRIL) 40 MG tablet  Take 1 Tab by mouth every day.             montelukast (SINGULAIR) 10 MG Tab  Take 1 Tab by mouth every day.             multivitamin (THERAGRAN) Tab  Take 1 Tab by mouth every day.             omeprazole (PRILOSEC) 20 MG delayed-release capsule  Take 1 Cap by mouth every day.             oxycodone immediate release (ROXICODONE) 10 MG immediate release tablet  Take 1-2 Tabs by mouth every 6 hours as needed for Moderate Pain (back pain and knee pain).             potassium chloride SA (KDUR) 20 MEQ Tab CR  Take 1 Tab by mouth every day.             predniSONE (DELTASONE) 10 MG Tab  Take 30 mg for two days, Then 20 mg for two days, Then 10 mg for two days and stop then.             simvastatin (ZOCOR) 10 MG Tab  TAKE 1 TABLET BY MOUTH EVERY EVENING             tiotropium (SPIRIVA) 18 MCG Cap  Inhale 1 Cap by mouth every day.             trazodone (DESYREL) 100 MG Tab  TAKE 3 TABLETS BY MOUTH NIGHTLY AT BEDTIME AS NEEDED FOR SLEEP                 Opioid prescription history checked: N/A. None prescribed.     Time spend preparing discharge: 40 minutes. This included face to face with the patient, medication reconciliation, care co ordination with RN involved in patient care and discussion and co ordination  with case management.     Signed:  Norman Boyce  4/9/2017  9:48 AM

## 2017-04-09 NOTE — PROGRESS NOTES
Discharge medication, discharge instructions, and discharge follow ups explained to pt. Pt expresses verbal understanding. Prescriptions given to pt, peripheral IV removed. Awaiting pts ride.

## 2017-04-10 ENCOUNTER — PATIENT OUTREACH (OUTPATIENT)
Dept: HEALTH INFORMATION MANAGEMENT | Facility: OTHER | Age: 67
End: 2017-04-10

## 2017-04-10 NOTE — PROGRESS NOTES
Priya says that she's doing much better than she was.  She confirmed that Allison GANDHI resumed service today (4/10/17).  She said that she has not r'cvd her medication yet but has made arrangements to have it delivered to her today.  I asked her to please give me a call if she doesn't r'cv them and I will also f/u with her to confirm.

## 2017-04-10 NOTE — PROGRESS NOTES
· 4/10/17 at 9:30 AM--Placed discharge outreach phone call to patient s/p hospital discharge 4/9/17.  Left voicemail with my contact information and instructions to return my phone call.    · 4/10/17 at 10:15 AM--Received VM from pt at 9:45 AM, placed phone call to pt, discharge outreach completed.

## 2017-04-11 DIAGNOSIS — Z12.11 SCREEN FOR COLON CANCER: ICD-10-CM

## 2017-04-12 LAB — HEMOCCULT STL QL IA: NEGATIVE

## 2017-04-24 ENCOUNTER — HOSPITAL ENCOUNTER (INPATIENT)
Facility: MEDICAL CENTER | Age: 67
LOS: 9 days | DRG: 871 | End: 2017-05-04
Attending: EMERGENCY MEDICINE | Admitting: HOSPITALIST
Payer: MEDICARE

## 2017-04-24 ENCOUNTER — APPOINTMENT (OUTPATIENT)
Dept: RADIOLOGY | Facility: MEDICAL CENTER | Age: 67
DRG: 871 | End: 2017-04-24
Payer: MEDICARE

## 2017-04-24 ENCOUNTER — APPOINTMENT (OUTPATIENT)
Dept: RADIOLOGY | Facility: MEDICAL CENTER | Age: 67
DRG: 871 | End: 2017-04-24
Attending: EMERGENCY MEDICINE
Payer: MEDICARE

## 2017-04-24 DIAGNOSIS — I95.9 HYPOTENSION, UNSPECIFIED HYPOTENSION TYPE: ICD-10-CM

## 2017-04-24 DIAGNOSIS — A41.9 SEVERE SEPSIS (HCC): ICD-10-CM

## 2017-04-24 DIAGNOSIS — E87.5 HYPERKALEMIA: ICD-10-CM

## 2017-04-24 DIAGNOSIS — R09.02 HYPOXIA: ICD-10-CM

## 2017-04-24 DIAGNOSIS — R65.20 SEVERE SEPSIS (HCC): ICD-10-CM

## 2017-04-24 DIAGNOSIS — J18.9 PNEUMONIA OF RIGHT LOWER LOBE DUE TO INFECTIOUS ORGANISM: ICD-10-CM

## 2017-04-24 DIAGNOSIS — J43.9 PULMONARY EMPHYSEMA, UNSPECIFIED EMPHYSEMA TYPE (HCC): ICD-10-CM

## 2017-04-24 DIAGNOSIS — J18.9 HCAP (HEALTHCARE-ASSOCIATED PNEUMONIA): ICD-10-CM

## 2017-04-24 LAB
ALBUMIN SERPL BCP-MCNC: 3 G/DL (ref 3.2–4.9)
ALBUMIN/GLOB SERPL: 1.1 G/DL
ALP SERPL-CCNC: 66 U/L (ref 30–99)
ALT SERPL-CCNC: 18 U/L (ref 2–50)
AMORPH CRY #/AREA URNS HPF: PRESENT /HPF
ANION GAP SERPL CALC-SCNC: 8 MMOL/L (ref 0–11.9)
ANISOCYTOSIS BLD QL SMEAR: ABNORMAL
APPEARANCE UR: ABNORMAL
AST SERPL-CCNC: 20 U/L (ref 12–45)
BASE EXCESS BLDA CALC-SCNC: -2 MMOL/L (ref -4–3)
BASO STIPL BLD QL SMEAR: NORMAL
BASOPHILS # BLD AUTO: 1.7 % (ref 0–1.8)
BASOPHILS # BLD: 0.21 K/UL (ref 0–0.12)
BILIRUB SERPL-MCNC: 0.7 MG/DL (ref 0.1–1.5)
BILIRUB UR QL STRIP.AUTO: NEGATIVE
BNP SERPL-MCNC: 308 PG/ML (ref 0–100)
BODY TEMPERATURE: ABNORMAL CENTIGRADE
BUN SERPL-MCNC: 40 MG/DL (ref 8–22)
CALCIUM SERPL-MCNC: 8.9 MG/DL (ref 8.5–10.5)
CHLORIDE SERPL-SCNC: 104 MMOL/L (ref 96–112)
CK MB SERPL-MCNC: 5.8 NG/ML (ref 0–5)
CO2 SERPL-SCNC: 24 MMOL/L (ref 20–33)
COLOR UR: YELLOW
CREAT SERPL-MCNC: 1.96 MG/DL (ref 0.5–1.4)
EOSINOPHIL # BLD AUTO: 0.11 K/UL (ref 0–0.51)
EOSINOPHIL NFR BLD: 0.9 % (ref 0–6.9)
ERYTHROCYTE [DISTWIDTH] IN BLOOD BY AUTOMATED COUNT: 63.1 FL (ref 35.9–50)
GFR SERPL CREATININE-BSD FRML MDRD: 25 ML/MIN/1.73 M 2
GLOBULIN SER CALC-MCNC: 2.7 G/DL (ref 1.9–3.5)
GLUCOSE SERPL-MCNC: 205 MG/DL (ref 65–99)
GLUCOSE UR STRIP.AUTO-MCNC: NEGATIVE MG/DL
HCO3 BLDA-SCNC: 23 MMOL/L (ref 17–25)
HCT VFR BLD AUTO: 26.2 % (ref 37–47)
HGB BLD-MCNC: 8 G/DL (ref 12–16)
HYALINE CASTS #/AREA URNS LPF: NORMAL /LPF
KETONES UR STRIP.AUTO-MCNC: NEGATIVE MG/DL
LACTATE BLD-SCNC: 1.9 MMOL/L (ref 0.5–2)
LEUKOCYTE ESTERASE UR QL STRIP.AUTO: NEGATIVE
LYMPHOCYTES # BLD AUTO: 0.96 K/UL (ref 1–4.8)
LYMPHOCYTES NFR BLD: 7.9 % (ref 22–41)
MANUAL DIFF BLD: NORMAL
MCH RBC QN AUTO: 28.3 PG (ref 27–33)
MCHC RBC AUTO-ENTMCNC: 30.5 G/DL (ref 33.6–35)
MCV RBC AUTO: 92.6 FL (ref 81.4–97.8)
MICRO URNS: ABNORMAL
MICROCYTES BLD QL SMEAR: ABNORMAL
MONOCYTES # BLD AUTO: 0.22 K/UL (ref 0–0.85)
MONOCYTES NFR BLD AUTO: 1.8 % (ref 0–13.4)
MORPHOLOGY BLD-IMP: NORMAL
MYELOCYTES NFR BLD MANUAL: 1.8 %
NEUTROPHILS # BLD AUTO: 10.39 K/UL (ref 2–7.15)
NEUTROPHILS NFR BLD: 83.3 % (ref 44–72)
NEUTS BAND NFR BLD MANUAL: 2.6 % (ref 0–10)
NITRITE UR QL STRIP.AUTO: NEGATIVE
NRBC # BLD AUTO: 0 K/UL
NRBC BLD AUTO-RTO: 0 /100 WBC
PCO2 BLDA: 41.6 MMHG (ref 26–37)
PH BLDA: 7.37 [PH] (ref 7.4–7.5)
PH UR STRIP.AUTO: 5.5 [PH]
PLATELET # BLD AUTO: 216 K/UL (ref 164–446)
PMV BLD AUTO: 9.4 FL (ref 9–12.9)
PO2 BLDA: 130.9 MMHG (ref 64–87)
POIKILOCYTOSIS BLD QL SMEAR: NORMAL
POLYCHROMASIA BLD QL SMEAR: NORMAL
POTASSIUM SERPL-SCNC: 5.8 MMOL/L (ref 3.6–5.5)
PROT SERPL-MCNC: 5.7 G/DL (ref 6–8.2)
PROT UR QL STRIP: 100 MG/DL
RBC # BLD AUTO: 2.83 M/UL (ref 4.2–5.4)
RBC BLD AUTO: PRESENT
RBC UR QL AUTO: NEGATIVE
SAO2 % BLDA: 98 % (ref 93–99)
SODIUM SERPL-SCNC: 136 MMOL/L (ref 135–145)
SP GR UR STRIP.AUTO: 1.01
TROPONIN I SERPL-MCNC: 0.09 NG/ML (ref 0–0.04)
WBC # BLD AUTO: 12.1 K/UL (ref 4.8–10.8)

## 2017-04-24 PROCEDURE — 99291 CRITICAL CARE FIRST HOUR: CPT

## 2017-04-24 PROCEDURE — 85610 PROTHROMBIN TIME: CPT

## 2017-04-24 PROCEDURE — B548ZZA ULTRASONOGRAPHY OF SUPERIOR VENA CAVA, GUIDANCE: ICD-10-PCS | Performed by: EMERGENCY MEDICINE

## 2017-04-24 PROCEDURE — 02HV33Z INSERTION OF INFUSION DEVICE INTO SUPERIOR VENA CAVA, PERCUTANEOUS APPROACH: ICD-10-PCS | Performed by: EMERGENCY MEDICINE

## 2017-04-24 PROCEDURE — 96367 TX/PROPH/DG ADDL SEQ IV INF: CPT

## 2017-04-24 PROCEDURE — 700105 HCHG RX REV CODE 258

## 2017-04-24 PROCEDURE — 85027 COMPLETE CBC AUTOMATED: CPT

## 2017-04-24 PROCEDURE — 700102 HCHG RX REV CODE 250 W/ 637 OVERRIDE(OP): Performed by: EMERGENCY MEDICINE

## 2017-04-24 PROCEDURE — 96368 THER/DIAG CONCURRENT INF: CPT

## 2017-04-24 PROCEDURE — 700101 HCHG RX REV CODE 250: Performed by: EMERGENCY MEDICINE

## 2017-04-24 PROCEDURE — 36556 INSERT NON-TUNNEL CV CATH: CPT

## 2017-04-24 PROCEDURE — 96365 THER/PROPH/DIAG IV INF INIT: CPT

## 2017-04-24 PROCEDURE — 93005 ELECTROCARDIOGRAM TRACING: CPT | Performed by: EMERGENCY MEDICINE

## 2017-04-24 PROCEDURE — 51702 INSERT TEMP BLADDER CATH: CPT

## 2017-04-24 PROCEDURE — C1751 CATH, INF, PER/CENT/MIDLINE: HCPCS

## 2017-04-24 PROCEDURE — 82553 CREATINE MB FRACTION: CPT

## 2017-04-24 PROCEDURE — 304561 HCHG STAT O2

## 2017-04-24 PROCEDURE — 700105 HCHG RX REV CODE 258: Performed by: EMERGENCY MEDICINE

## 2017-04-24 PROCEDURE — 87086 URINE CULTURE/COLONY COUNT: CPT

## 2017-04-24 PROCEDURE — 700111 HCHG RX REV CODE 636 W/ 250 OVERRIDE (IP): Performed by: EMERGENCY MEDICINE

## 2017-04-24 PROCEDURE — 71010 DX-CHEST-PORTABLE (1 VIEW): CPT

## 2017-04-24 PROCEDURE — 87040 BLOOD CULTURE FOR BACTERIA: CPT | Mod: 91

## 2017-04-24 PROCEDURE — 81001 URINALYSIS AUTO W/SCOPE: CPT

## 2017-04-24 PROCEDURE — 85007 BL SMEAR W/DIFF WBC COUNT: CPT

## 2017-04-24 PROCEDURE — 94760 N-INVAS EAR/PLS OXIMETRY 1: CPT

## 2017-04-24 PROCEDURE — 93005 ELECTROCARDIOGRAM TRACING: CPT

## 2017-04-24 PROCEDURE — 36415 COLL VENOUS BLD VENIPUNCTURE: CPT

## 2017-04-24 PROCEDURE — 82803 BLOOD GASES ANY COMBINATION: CPT

## 2017-04-24 PROCEDURE — 96375 TX/PRO/DX INJ NEW DRUG ADDON: CPT

## 2017-04-24 PROCEDURE — 83605 ASSAY OF LACTIC ACID: CPT | Mod: 91

## 2017-04-24 PROCEDURE — 83880 ASSAY OF NATRIURETIC PEPTIDE: CPT

## 2017-04-24 PROCEDURE — 84484 ASSAY OF TROPONIN QUANT: CPT

## 2017-04-24 PROCEDURE — 80053 COMPREHEN METABOLIC PANEL: CPT

## 2017-04-24 PROCEDURE — 303105 HCHG CATHETER EXTRA

## 2017-04-24 PROCEDURE — 700111 HCHG RX REV CODE 636 W/ 250 OVERRIDE (IP)

## 2017-04-24 PROCEDURE — 94640 AIRWAY INHALATION TREATMENT: CPT

## 2017-04-24 RX ORDER — DEXTROSE MONOHYDRATE 25 G/50ML
25 INJECTION, SOLUTION INTRAVENOUS ONCE
Status: COMPLETED | OUTPATIENT
Start: 2017-04-24 | End: 2017-04-24

## 2017-04-24 RX ORDER — SODIUM CHLORIDE 9 MG/ML
1000 INJECTION, SOLUTION INTRAVENOUS ONCE
Status: COMPLETED | OUTPATIENT
Start: 2017-04-24 | End: 2017-04-24

## 2017-04-24 RX ORDER — NOREPINEPHRINE BITARTRATE 1 MG/ML
INJECTION, SOLUTION INTRAVENOUS
Status: DISPENSED
Start: 2017-04-24 | End: 2017-04-25

## 2017-04-24 RX ORDER — SODIUM CHLORIDE 9 MG/ML
30 INJECTION, SOLUTION INTRAVENOUS
Status: COMPLETED | OUTPATIENT
Start: 2017-04-24 | End: 2017-04-25

## 2017-04-24 RX ADMIN — DEXTROSE MONOHYDRATE 50 ML: 25 INJECTION, SOLUTION INTRAVENOUS at 23:28

## 2017-04-24 RX ADMIN — NOREPINEPHRINE BITARTRATE 10 MCG/MIN: 1 INJECTION INTRAVENOUS at 22:58

## 2017-04-24 RX ADMIN — VANCOMYCIN HYDROCHLORIDE 2700 MG: 100 INJECTION, POWDER, LYOPHILIZED, FOR SOLUTION INTRAVENOUS at 23:02

## 2017-04-24 RX ADMIN — ALBUTEROL SULFATE 5 MG: 2.5 SOLUTION RESPIRATORY (INHALATION) at 23:39

## 2017-04-24 RX ADMIN — INSULIN HUMAN 10 UNITS: 100 INJECTION, SOLUTION PARENTERAL at 23:28

## 2017-04-24 RX ADMIN — CALCIUM GLUCONATE 1000 MG: 94 INJECTION, SOLUTION INTRAVENOUS at 23:36

## 2017-04-24 RX ADMIN — PIPERACILLIN SODIUM AND TAZOBACTAM SODIUM 3.38 G: 3; .375 INJECTION, POWDER, FOR SOLUTION INTRAVENOUS at 22:24

## 2017-04-24 RX ADMIN — SODIUM CHLORIDE 1000 ML: 9 INJECTION, SOLUTION INTRAVENOUS at 22:24

## 2017-04-24 ASSESSMENT — ENCOUNTER SYMPTOMS
VOMITING: 0
NAUSEA: 0
DIZZINESS: 0
SHORTNESS OF BREATH: 1
COUGH: 0
FEVER: 0
WEAKNESS: 0
ABDOMINAL PAIN: 0
CHILLS: 0
DIARRHEA: 0

## 2017-04-24 ASSESSMENT — LIFESTYLE VARIABLES: DO YOU DRINK ALCOHOL: NO

## 2017-04-24 ASSESSMENT — PAIN SCALES - GENERAL: PAINLEVEL_OUTOF10: 4

## 2017-04-24 NOTE — IP AVS SNAPSHOT
5/4/2017    Priya Callahan  [address] [place] [address]    Dear Priya:    [facility] wants to ensure your discharge home is safe and you or your loved ones have had all of your questions answered regarding your care after you leave the hospital.    Below is a list of resources and contact information should you have any questions regarding your hospital stay, follow-up instructions, or active medical symptoms.    Questions or Concerns Regarding… Contact   Medical Questions Related to Your Discharge  (7 days a week, 8am-5pm) Contact a Nurse Care Coordinator   [phone]   Medical Questions Not Related to Your Discharge  (24 hours a day / 7 days a week)  Contact the Nurse Health Line   [phone]    Medications or Discharge Instructions Refer to your discharge packet   or contact your [facility] Primary Care Provider   [phone]   Follow-up Appointment(s) Schedule your appointment via [employer]   or contact Scheduling [phone]   Billing Review your statement via [employer]  or contact Billing [phone]   Medical Records Review your records via [employer]   or contact Medical Records [phone]     You may receive a telephone call within two days of discharge. This call is to make certain you understand your discharge instructions and have the opportunity to have any questions answered. You can also easily access your medical information, test results and upcoming appointments via the [employer] free online health management tool. You can learn more and sign up at [employer]. For assistance setting up your [employer] account, please call [phone].    Once again, we want to ensure your discharge home is safe and that you have a clear understanding of any next steps in your care. If you have any questions or concerns, please do not hesitate to contact us, we are here for you. Thank you for choosing [facility] for your healthcare needs.    Sincerely,    Your [facility] Healthcare Team

## 2017-04-24 NOTE — IP AVS SNAPSHOT
" Home Care Instructions                                                                                                                  Name:Pryia Callahan  Medical Record Number:[id number]  CSN: [id number]    YOB: 1950   Age: 66 y.o.  Sex: female  HT:1.626 m (5' 4.02\") WT: 111.6 kg (246 lb 0.5 oz)          Admit Date: 4/24/2017     Discharge Date:   Today's Date: 5/4/2017  Attending Doctor:  Valorie Hemphill D.O.                  Allergies:  Vitamin c; Keflex; Codeine; Lyrica; and Sudafed            Discharge Instructions       Discharge Instructions    Discharged to home by car with relative. Discharged via wheelchair, hospital escort: Yes.  Special equipment needed: Oxygen    Be sure to schedule a follow-up appointment with your primary care doctor or any specialists as instructed.     Discharge Plan:   Diet Plan: Discussed  Activity Level: Discussed  Smoking Cessation Offered: Patient Counseled  Confirmed Follow up Appointment: Appointment Scheduled  Confirmed Symptoms Management: Discussed  Medication Reconciliation Updated: Yes  Influenza Vaccine Indication: Not indicated: Previously immunized this influenza season and > 8 years of age    I understand that a diet low in cholesterol, fat, and sodium is recommended for good health. Unless I have been given specific instructions below for another diet, I accept this instruction as my diet prescription.   Other diet: regular    Special Instructions: None    · Is patient discharged on Warfarin / Coumadin?   No   MY COPD ACTION PLAN     It is recommended that patients and physicians /healthcare providers complete this action plan together. This plan should be discussed at each physician visit and updated as needed.    The green, yellow and red zones show groups of symptoms of COPD. This list of symptoms is not comprehensive, and you may experience other symptoms. In the \"Actions\" column, your healthcare provider has recommended actions for you to " "take based on your symptoms.    Patient Name: Priya Callahan   YOB: 1950   Last Updated on:     Green Zone:  I am doing well today Actions   •  Usual activitiy and exercise level •  Take daily medications   •  Usual amounts of cough and phlegm/mucus •  Use oxygen as prescribed   •  Sleep well at night •  Continue regular exercise/diet plan   •  Appetite is good •  At all times avoid cigarette smoke, inhaled irritants     Daily Medications (these medications are taken every day):               Yellow Zone:  I am having a bad day or a COPD flare Actions   •  More breathless than usual •  Continue daily medications   •  I have less energy for my daily activities •  Use quick relief inhaler as ordered   •  Increased or thicker phlegm/mucus •  Use oxygen as prescribed   •  Using quick relief inhaler/nebulizer more often •  Get plenty of rest   •  Swelling of ankles more than usual •  Use pursed lip breathing   •  More coughing than usual •  At all times avoid cigarette smoke, inhaled irritants   •  I feel like I have a \"chest cold\"   •  Poor sleep and my symptoms woke me up   •  My appetite is not good   •  My medicine is not helping    •  Call provider immediately if symptoms don’t improve     Continue daily medications, add rescue medications:               Medications to be used during a flare up, (as Discussed with Provider):             Red Zone:  I need urgent medical care Actions   •  Severe shortness of breath even at rest •  Call 911 or seek medical care immediately   •  Not able to do any activity because of breathing    •  Fever or shaking chills    •  Feeling confused or very drowsy     •  Chest pains    •  Coughing up blood      Discharge Instructions    Discharged to home by car with friend. Discharged via wheelchair, hospital escort: Yes.  Special equipment needed: Oxygen    Be sure to schedule a follow-up appointment with your primary care doctor or any specialists as instructed.  "     Discharge Plan:   Diet Plan: Discussed  Activity Level: Discussed  Smoking Cessation Offered: Patient Counseled  Confirmed Follow up Appointment: Appointment Scheduled  Confirmed Symptoms Management: Discussed  Medication Reconciliation Updated: Yes  Influenza Vaccine Indication: Not indicated: Previously immunized this influenza season and > 8 years of age    I understand that a diet low in cholesterol, fat, and sodium is recommended for good health. Unless I have been given specific instructions below for another diet, I accept this instruction as my diet prescription.   Other diet: low carb/low sodium/ restrict fluid intake    Special Instructions: None    · Is patient discharged on Warfarin / Coumadin?   No     · Is patient Post Blood Transfusion?  No    Depression / Suicide Risk    As you are discharged from this [facility] facility, it is important to learn how to keep safe from harming yourself.    Recognize the warning signs:  · Abrupt changes in personality, positive or negative- including increase in energy   · Giving away possessions  · Change in eating patterns- significant weight changes-  positive or negative  · Change in sleeping patterns- unable to sleep or sleeping all the time   · Unwillingness or inability to communicate  · Depression  · Unusual sadness, discouragement and loneliness  · Talk of wanting to die  · Neglect of personal appearance   · Rebelliousness- reckless behavior  · Withdrawal from people/activities they love  · Confusion- inability to concentrate     If you or a loved one observes any of these behaviors or has concerns about self-harm, here's what you can do:  · Talk about it- your feelings and reasons for harming yourself  · Remove any means that you might use to hurt yourself (examples: pills, rope, extension cords, firearm)  · Get professional help from the community (Mental Health, Substance Abuse, psychological counseling)  · Do not be alone:Call your Safe Contact-  someone whom you trust who will be there for you.  · Call your local CRISIS HOTLINE 247-4006 or 303-083-6142  · Call your local Children's Mobile Crisis Response Team [place] (826) 365-0420 or www.POW  · Call the toll free National Suicide Prevention Hotlines   · National Suicide Prevention Lifeline 775-780-JUAW (9832)  · National Hope Line Network 800-SUICIDE (870-1076)      Sepsis, Adult  Sepsis is a serious infection of your blood or tissues that affects your whole body. The infection that causes sepsis may be bacterial, viral, fungal, or parasitic. Sepsis may be life threatening. Sepsis can cause your blood pressure to drop. This may result in shock. Shock causes your central nervous system and your organs to stop working correctly.   RISK FACTORS  Sepsis can happen in anyone, but it is more likely to happen in people who have weakened immune systems.  SIGNS AND SYMPTOMS   Symptoms of sepsis can include:  · Fever or low body temperature (hypothermia).  · Rapid breathing (hyperventilation).  · Chills.  · Rapid heartbeat (tachycardia).  · Confusion or light-headedness.  · Trouble breathing.  · Urinating much less than usual.  · Cool, clammy skin or red, flushed skin.  · Other problems with the heart, kidneys, or brain.  DIAGNOSIS   Your health care provider will likely do tests to look for an infection, to see if the infection has spread to your blood, and to see how serious your condition is. Tests can include:  2. Blood tests, including cultures of your blood.  3. Cultures of other fluids from your body, such as:  1. Urine.  2. Pus from wounds.  3. Mucus coughed up from your lungs.  4. Urine tests other than cultures.  5. X-ray exams or other imaging tests.  TREATMENT   Treatment will begin with elimination of the source of infection. If your sepsis is likely caused by a bacterial or fungal infection, you will be given antibiotic or antifungal medicines.  You may also  receive:  2. Oxygen.  3. Fluids through an IV tube.  4. Medicines to increase your blood pressure.  5. A machine to clean your blood (dialysis) if your kidneys fail.  6. A machine to help you breathe if your lungs fail.  SEEK IMMEDIATE MEDICAL CARE IF:  You get an infection or develop any of the signs and symptoms of sepsis after surgery or a hospitalization.     This information is not intended to replace advice given to you by your health care provider. Make sure you discuss any questions you have with your health care provider.     Document Released: 09/15/2004 Document Revised: 05/03/2016 Document Reviewed: 08/25/2014  [employer] Interactive Patient Education ©2016 [employer]      Depression / Suicide Risk    As you are discharged from this [facility] facility, it is important to learn how to keep safe from harming yourself.    Recognize the warning signs:  · Abrupt changes in personality, positive or negative- including increase in energy   · Giving away possessions  · Change in eating patterns- significant weight changes-  positive or negative  · Change in sleeping patterns- unable to sleep or sleeping all the time   · Unwillingness or inability to communicate  · Depression  · Unusual sadness, discouragement and loneliness  · Talk of wanting to die  · Neglect of personal appearance   · Rebelliousness- reckless behavior  · Withdrawal from people/activities they love  · Confusion- inability to concentrate     If you or a loved one observes any of these behaviors or has concerns about self-harm, here's what you can do:  · Talk about it- your feelings and reasons for harming yourself  · Remove any means that you might use to hurt yourself (examples: pills, rope, extension cords, firearm)  · Get professional help from the community (Mental Health, Substance Abuse, psychological counseling)  · Do not be alone:Call your Safe Contact- someone whom you trust who will be there for you.  · Call your local CRISIS  HOTLINE 285-5572 or 928-785-6311  · Call your local Children's Mobile Crisis Response Team [place] (764) 847-8137 or www.[employer]  · Call the toll free National Suicide Prevention Hotlines   · National Suicide Prevention Lifeline 938-452-JULP (1898)  · National Hope Line Network 800-SUICIDE (706-0907)        Your appointments     May 31, 2017 10:30 AM   Telemedicine Clinic New Patient with Dragan Vela M.D., [facility]   [facility]    [address] [place] [address] [phone]            Josh 15, 2017 11:20 AM   NEW TO YOU with Kavitha Mccall M.D.   [facility]    [address] [place] [address] [place] [address] [phone]            Jun 26, 2017 11:40 AM   Telemedicine Clinic Established Pt with [facility], [facility], [facility]   Centralized Scheduling (--)    [address]  [facility] [address] [phone]              Follow-up Information     1. Follow up with Mickie Lawson M.D.. Schedule an appointment as soon as possible for a visit in 1 week.    Specialty:  Pediatrics    Contact information    [address] [place] [address] [address]  [place] [address] [phone]           Discharge Medication Instructions:    Below are the medications your physician expects you to take upon discharge:    Review all your home medications and newly ordered medications with your doctor and/or pharmacist. Follow medication instructions as directed by your doctor and/or pharmacist.    Please keep your medication list with you and share with your physician.               Medication List      START taking these medications        Instructions    Morning Afternoon Evening Bedtime    hydrocortisone 20 MG Tabs   Last time this was given:  20 mg on 5/4/2017  8:10 AM   Commonly known as:  CORTEF        Take 1 Tab by mouth every day.   Dose:  20 mg                         sulfamethoxazole-trimethoprim 800-160 MG tablet   Last time this was given:  1 Tab on 5/4/2017  8:07 AM   Commonly known as:  BACTRIM DS        Take 1 Tab by mouth every 12 hours.   Dose:  1 Tab                          CHANGE how you take these medications        Instructions    Morning Afternoon Evening Bedtime    * albuterol-ipratropium  MCG/ACT Aero   What changed:  Another medication with the same name was added. Make sure you understand how and when to take each.   Commonly known as:  COMBIVENT        Inhale 2 Puffs by mouth every 6 hours as needed for Shortness of Breath.   Dose:  2 Puff                        * ipratropium-albuterol 0.5-2.5 (3) MG/3ML nebulizer solution   What changed:  You were already taking a medication with the same name, and this prescription was added. Make sure you understand how and when to take each.   Last time this was given:  3 mL on 5/4/2017 11:21 AM   Commonly known as:  DUONEB        3 mL by Nebulization route every four hours as needed for Shortness of Breath.   Dose:  3 mL                        * Notice:  This list has 2 medication(s) that are the same as other medications prescribed for you. Read the directions carefully, and ask your doctor or other care provider to review them with you.      CONTINUE taking these medications        Instructions    Morning Afternoon Evening Bedtime    amlodipine 10 MG Tabs   Last time this was given:  5 mg on 5/4/2017  8:07 AM   Commonly known as:  NORVASC        Take 1 Tab by mouth every day.   Dose:  10 mg                        carvedilol 3.125 MG Tabs   Last time this was given:  3.125 mg on 5/4/2017  8:07 AM   Commonly known as:  COREG        Take 1 Tab by mouth 2 times a day, with meals.   Dose:  3.125 mg                        cyclobenzaprine 10 MG Tabs   Last time this was given:  10 mg on 5/3/2017  8:40 PM   Commonly known as:  FLEXERIL        TAKE 1 TABLET BY MOUTH THREE TIMES DAILY AS NEEDED FOR MILD PAIN                         duloxetine 60 MG Cpep delayed-release capsule   Last time this was given:  60 mg on 5/4/2017  8:07 AM   Commonly known as:  CYMBALTA        Take 1 Cap by mouth every day.   Dose:  60 mg                        escitalopram 10 MG Tabs   Commonly known as:  LEXAPRO        Take 1 Tab by mouth every day.   Dose:  10 mg                        ferrous gluconate 324 (38 FE) MG Tabs   Commonly known as:  FERGON        Take 1 Tab by mouth 2 times a day, with meals.   Dose:  324 mg                        fluticasone-salmeterol 500-50 MCG/DOSE Aepb   Commonly known as:  ADVAIR        Doctor's comments:  Needs OV by 9/2016   Inhale 1 Puff by mouth every 12 hours.   Dose:  1 Puff                        gabapentin 600 MG tablet   Commonly known as:  NEURONTIN        Take 1 Tab by mouth 3 times a day.   Dose:  600 mg                        insulin glargine 100 UNIT/ML Soln   Commonly known as:  LANTUS        Inject 20 Units as instructed every evening.   Dose:  20 Units                        levothyroxine 75 MCG Tabs   Last time this was given:  75 mcg on 5/4/2017  6:07 AM   Commonly known as:  SYNTHROID        TAKE 1 TABLET BY MOUTH DAILY                        montelukast 10 MG Tabs   Last time this was given:  10 mg on 5/4/2017  8:07 AM   Commonly known as:  SINGULAIR        Take 1 Tab by mouth every day.   Dose:  10 mg                        multivitamin Tabs   Last time this was given:  1 Tab on 5/4/2017  8:07 AM        Take 1 Tab by mouth every day.   Dose:  1 Tab                        omeprazole 20 MG delayed-release capsule   Commonly known as:  PRILOSEC        Take 1 Cap by mouth every day.   Dose:  20 mg                        oxycodone immediate release 10 MG immediate release tablet   Last time this was given:  20 mg on 5/3/2017  8:40 PM   Commonly known as:  ROXICODONE        Doctor's comments:  May fill 4/17/17   Take 1-2 Tabs by mouth every 6 hours as needed for Moderate Pain (back pain and knee pain).      Dose:  10-20 mg                        potassium chloride SA 20 MEQ Tbcr   Commonly known as:  Kdur        Take 1 Tab by mouth every day.   Dose:  20 mEq                        simvastatin 10 MG Tabs   Last time this was given:  10 mg on 5/3/2017  8:40 PM   Commonly known as:  ZOCOR        TAKE 1 TABLET BY MOUTH EVERY EVENING                        tiotropium 18 MCG Caps   Last time this was given:  1 Cap on 5/4/2017  8:10 AM   Commonly known as:  SPIRIVA        Inhale 1 Cap by mouth every day.   Dose:  18 mcg                        trazodone 100 MG Tabs   Last time this was given:  75 mg on 5/3/2017  8:40 PM   Commonly known as:  DESYREL        Doctor's comments:  PLEASE REPLY   TAKE 3 TABLETS BY MOUTH NIGHTLY AT BEDTIME AS NEEDED FOR SLEEP                          STOP taking these medications     ascorbic acid 500 MG tablet   Commonly known as:  VITAMIN C               hydrochlorothiazide 25 MG Tabs   Commonly known as:  HYDRODIURIL               lisinopril 40 MG tablet   Commonly known as:  PRINIVIL, ZESTRIL                    Where to Get Your Medications      Information about where to get these medications is not yet available     ! Ask your nurse or doctor about these medications    - duloxetine 60 MG Cpep delayed-release capsule  - hydrocortisone 20 MG Tabs  - ipratropium-albuterol 0.5-2.5 (3) MG/3ML nebulizer solution  - sulfamethoxazole-trimethoprim 800-160 MG tablet  - tiotropium 18 MCG Caps            Orders for after discharge     DME Nebulizer    Complete by:  As directed    Small volume nebulizer and supplies.       DME O2 New Set Up    Complete by:  As directed              Instructions           Diet / Nutrition:    Follow any diet instructions given to you by your doctor or the dietician, including how much salt (sodium) you are allowed each day.    If you are overweight, talk to your doctor about a weight reduction plan.    Activity:    Remain physically active following your doctor's  instructions about exercise and activity.    Rest often.     Any time you become even a little tired or short of breath, SIT DOWN and rest.    Worsening Symptoms:    Report any of the following signs and symptoms to the doctor's office immediately:    *Pain of jaw, arm, or neck  *Chest pain not relieved by medication                               *Dizziness or loss of consciousness  *Difficulty breathing even when at rest   *More tired than usual                                       *Bleeding drainage or swelling of surgical site  *Swelling of feet, ankles, legs or stomach                 *Fever (>100ºF)  *Pink or blood tinged sputum  *Weight gain (3lbs/day or 5lbs /week)           *Shock from internal defibrillator (if applicable)  *Palpitations or irregular heartbeats                *Cool and/or numb extremities    Stroke Awareness    Common Risk Factors for Stroke include:    Age  Atrial Fibrillation  Carotid Artery Stenosis  Diabetes Mellitus  Excessive alcohol consumption  High blood pressure  Overweight   Physical inactivity  Smoking    Warning signs and symptoms of a stroke include:    *Sudden numbness or weakness of the face, arm or leg (especially on one side of the body).  *Sudden confusion, trouble speaking or understanding.  *Sudden trouble seeing in one or both eyes.  *Sudden trouble walking, dizziness, loss of balance or coordination.Sudden severe headache with no known cause.    It is very important to get treatment quickly when a stroke occurs. If you experience any of the above warning signs, call 911 immediately.                   Disclaimer         Quit Smoking / Tobacco Use:    I understand the use of any tobacco products increases my chance of suffering from future heart disease or stroke and could cause other illnesses which may shorten my life. Quitting the use of tobacco products is the single most important thing I can do to improve my health. For further information on smoking / tobacco  cessation call a Toll Free Quit Line at 1-741.424.8239 (*National [employer] [place]) or 1-260.462.1496 (American Lung Association) or you can access the web based program at www.lungusa.org.    [place] Tobacco Users Help Line:  (963) 665-7384       Toll Free: 1-342.228.7337  Quit Tobacco Program [facility] (036)962-7378    Crisis Hotline:    [place] Crisis Hotline:  6-277-XCUPXLO or 1-261.370.9822    [place] Crisis Hotline:    1-129.246.9070 or 603-840-5933    Discharge Survey:   Thank you for choosing [facility]. We hope we did everything we could to make your hospital stay a pleasant one. You may be receiving a phone survey and we would appreciate your time and participation in answering the questions. Your input is very valuable to us in our efforts to improve our service to our patients and their families.        My signature on this form indicates that:    1. I have reviewed and understand the above information.  2. My questions regarding this information have been answered to my satisfaction.  3. I have formulated a plan with my discharge nurse to obtain my prescribed medications for home.                  Disclaimer         __________________________________                     __________       ________                       Patient Signature                                                 Date                    Time

## 2017-04-24 NOTE — IP AVS SNAPSHOT
" <p align=\"LEFT\"><IMG SRC=\"//EMRWB/blob$/Images/[email or web address]\" alt=\"Image\" WIDTH=\"50%\" HEIGHT=\"200\" BORDER=\"\"></p>                   Name:Priya Callahan  Medical Record Number:[id number]  CSN: [id number]    YOB: 1950   Age: 66 y.o.  Sex: female  HT:1.626 m (5' 4.02\") WT: 111.6 kg (246 lb 0.5 oz)          Admit Date: 4/24/2017     Discharge Date:   Today's Date: 5/4/2017  Attending Doctor:  Valorie Hemphill D.O.                  Allergies:  Vitamin c; Keflex; Codeine; Lyrica; and Sudafed          Your appointments     May 31, 2017 10:30 AM   Telemedicine Clinic New Patient with Dragan Vela M.D., [facility]LE[facility]ICINE [place], [facility]    [address] [place] [address] [phone]            Josh 15, 2017 11:20 AM   NEW TO YOU with Kavitha Mccall M.D.   [address] [address] [phone]            Jun 26, 2017 11:40 AM   Telemedicine Clinic Established Pt with A [facility], [facility], [facility]   Centralized Scheduling (--)    [address] [facility].  [place] [id number] [phone]              Follow-up Information     1. Follow up with Mickie Lawson M.D.. Schedule an appointment as soon as possible for a visit in 1 week.    Specialty:  Pediatrics    Contact information    [address] [place] [address] [address] [place] [address] [phone]           Medication List      Take these Medications        Instructions    * albuterol-ipratropium  MCG/ACT Aero   What changed:  Another medication with the same name was added. Make sure you understand how and when to take each.   Commonly known as:  COMBIVENT    Inhale 2 Puffs by mouth every 6 hours as needed for Shortness of Breath.   Dose:  2 Puff       * ipratropium-albuterol 0.5-2.5 (3) MG/3ML nebulizer solution   What changed:  You were already taking a medication with the same name, " and this prescription was added. Make sure you understand how and when to take each.   Commonly known as:  DUONEB    3 mL by Nebulization route every four hours as needed for Shortness of Breath.   Dose:  3 mL       amlodipine 10 MG Tabs   Commonly known as:  NORVASC    Take 1 Tab by mouth every day.   Dose:  10 mg       carvedilol 3.125 MG Tabs   Commonly known as:  COREG    Take 1 Tab by mouth 2 times a day, with meals.   Dose:  3.125 mg       cyclobenzaprine 10 MG Tabs   Commonly known as:  FLEXERIL    TAKE 1 TABLET BY MOUTH THREE TIMES DAILY AS NEEDED FOR MILD PAIN       duloxetine 60 MG Cpep delayed-release capsule   Commonly known as:  CYMBALTA    Take 1 Cap by mouth every day.   Dose:  60 mg       escitalopram 10 MG Tabs   Commonly known as:  LEXAPRO    Take 1 Tab by mouth every day.   Dose:  10 mg       ferrous gluconate 324 (38 FE) MG Tabs   Commonly known as:  FERGON    Take 1 Tab by mouth 2 times a day, with meals.   Dose:  324 mg       fluticasone-salmeterol 500-50 MCG/DOSE Aepb   Commonly known as:  ADVAIR    Doctor's comments:  Needs OV by 9/2016   Inhale 1 Puff by mouth every 12 hours.   Dose:  1 Puff       gabapentin 600 MG tablet   Commonly known as:  NEURONTIN    Take 1 Tab by mouth 3 times a day.   Dose:  600 mg       hydrocortisone 20 MG Tabs   Commonly known as:  CORTEF    Take 1 Tab by mouth every day.   Dose:  20 mg       insulin glargine 100 UNIT/ML Soln   Commonly known as:  LANTUS    Inject 20 Units as instructed every evening.   Dose:  20 Units       levothyroxine 75 MCG Tabs   Commonly known as:  SYNTHROID    TAKE 1 TABLET BY MOUTH DAILY       montelukast 10 MG Tabs   Commonly known as:  SINGULAIR    Take 1 Tab by mouth every day.   Dose:  10 mg       multivitamin Tabs    Take 1 Tab by mouth every day.   Dose:  1 Tab       omeprazole 20 MG delayed-release capsule   Commonly known as:  PRILOSEC    Take 1 Cap by mouth every day.   Dose:  20 mg       oxycodone immediate release 10 MG  immediate release tablet   Commonly known as:  ROXICODONE    Doctor's comments:  May fill 4/17/17   Take 1-2 Tabs by mouth every 6 hours as needed for Moderate Pain (back pain and knee pain).   Dose:  10-20 mg       potassium chloride SA 20 MEQ Tbcr   Commonly known as:  Kdur    Take 1 Tab by mouth every day.   Dose:  20 mEq       simvastatin 10 MG Tabs   Commonly known as:  ZOCOR    TAKE 1 TABLET BY MOUTH EVERY EVENING       sulfamethoxazole-trimethoprim 800-160 MG tablet   Commonly known as:  BACTRIM DS    Take 1 Tab by mouth every 12 hours.   Dose:  1 Tab       tiotropium 18 MCG Caps   Commonly known as:  SPIRIVA    Inhale 1 Cap by mouth every day.   Dose:  18 mcg       trazodone 100 MG Tabs   Commonly known as:  DESYREL    Doctor's comments:  PLEASE REPLY   TAKE 3 TABLETS BY MOUTH NIGHTLY AT BEDTIME AS NEEDED FOR SLEEP       * Notice:  This list has 2 medication(s) that are the same as other medications prescribed for you. Read the directions carefully, and ask your doctor or other care provider to review them with you.

## 2017-04-24 NOTE — IP AVS SNAPSHOT
Hango Access Code: [email or web address]  Expires: 6/3/2017  2:30 PM    Your email address is not on file at [employer]  Email Addresses are required for you to sign up for Hango, please contact [phone] to verify your personal information and to provide your email address prior to attempting to register for Hango.    Priya Burt London  [address] [place] [address]    Hango  A secure, online tool to manage your health information     [employer]’s Hango® is a secure, online tool that connects you to your personalized health information from the privacy of your home -- day or night - making it very easy for you to manage your healthcare. Once the activation process is completed, you can even access your medical information using the Hango mercy, which is available for free in the Apple Mercy store or Google Play store.     To learn more about Hango, visit [email or web address]    There are two levels of access available (as shown below):   My Chart Features  [facility] Primary Care Doctor [facility]  Specialists [facility]  Urgent  Care Non-[facility] Primary Care Doctor   Email your healthcare team securely and privately 24/7 X X X    Manage appointments: schedule your next appointment; view details of past/upcoming appointments X      Request prescription refills. X      View recent personal medical records, including lab and immunizations X X X X   View health record, including health history, allergies, medications X X X X   Read reports about your outpatient visits, procedures, consult and ER notes X X X X   See your discharge summary, which is a recap of your hospital and/or ER visit that includes your diagnosis, lab results, and care plan X X  X     How to register for Oppat:  Once your e-mail address has been verified, follow the following steps to sign up for Oppat.     1. Go to  [email or web address]  2. Click on the Sign Up Now box, which takes you to the New Member Sign Up page.  You will need to provide the following information:  a. Enter your [employer] Access Code exactly as it appears at the top of this page. (You will not need to use this code after you’ve completed the sign-up process. If you do not sign up before the expiration date, you must request a new code.)   b. Enter your date of birth.   c. Enter your home email address.   d. Click Submit, and follow the next screen’s instructions.  3. Create a Hot Mix Mobilet ID. This will be your Neurelis login ID and cannot be changed, so think of one that is secure and easy to remember.  4. Create a Neurelis password. You can change your password at any time.  5. Enter your Password Reset Question and Answer. This can be used at a later time if you forget your password.   6. Enter your e-mail address. This allows you to receive e-mail notifications when new information is available in Neurelis.  7. Click Sign Up. You can now view your health information.    For assistance activating your Neurelis account, call [phone]

## 2017-04-25 ENCOUNTER — RESOLUTE PROFESSIONAL BILLING HOSPITAL PROF FEE (OUTPATIENT)
Dept: HOSPITALIST | Facility: MEDICAL CENTER | Age: 67
End: 2017-04-25
Payer: MEDICARE

## 2017-04-25 PROBLEM — A41.9 SEPSIS (HCC): Status: ACTIVE | Noted: 2017-04-25

## 2017-04-25 PROBLEM — R79.89 ELEVATED TROPONIN: Status: ACTIVE | Noted: 2017-04-25

## 2017-04-25 PROBLEM — J18.9 CAP (COMMUNITY ACQUIRED PNEUMONIA): Status: RESOLVED | Noted: 2017-03-07 | Resolved: 2017-04-25

## 2017-04-25 LAB
ANION GAP SERPL CALC-SCNC: 6 MMOL/L (ref 0–11.9)
BUN SERPL-MCNC: 32 MG/DL (ref 8–22)
CALCIUM SERPL-MCNC: 7.7 MG/DL (ref 8.5–10.5)
CHLORIDE SERPL-SCNC: 110 MMOL/L (ref 96–112)
CO2 SERPL-SCNC: 21 MMOL/L (ref 20–33)
CORTIS SERPL-MCNC: 22.1 UG/DL (ref 0–23)
CREAT SERPL-MCNC: 1.52 MG/DL (ref 0.5–1.4)
EKG IMPRESSION: NORMAL
GFR SERPL CREATININE-BSD FRML MDRD: 34 ML/MIN/1.73 M 2
GLUCOSE BLD-MCNC: 231 MG/DL (ref 65–99)
GLUCOSE BLD-MCNC: 248 MG/DL (ref 65–99)
GLUCOSE BLD-MCNC: 295 MG/DL (ref 65–99)
GLUCOSE BLD-MCNC: 349 MG/DL (ref 65–99)
GLUCOSE BLD-MCNC: 362 MG/DL (ref 65–99)
GLUCOSE SERPL-MCNC: 234 MG/DL (ref 65–99)
GRAM STN SPEC: NORMAL
INR PPP: 1.08 (ref 0.87–1.13)
IRON SATN MFR SERPL: 4 % (ref 15–55)
IRON SERPL-MCNC: 10 UG/DL (ref 40–170)
LACTATE BLD-SCNC: 1.1 MMOL/L (ref 0.5–2)
LACTATE BLD-SCNC: 1.5 MMOL/L (ref 0.5–2)
LACTATE BLD-SCNC: 1.7 MMOL/L (ref 0.5–2)
POTASSIUM SERPL-SCNC: 4.9 MMOL/L (ref 3.6–5.5)
PROTHROMBIN TIME: 14.3 SEC (ref 12–14.6)
SCCMEC + MECA PNL NOSE NAA+PROBE: NEGATIVE
SCCMEC + MECA PNL NOSE NAA+PROBE: NEGATIVE
SIGNIFICANT IND 70042: NORMAL
SITE SITE: NORMAL
SODIUM SERPL-SCNC: 137 MMOL/L (ref 135–145)
SOURCE SOURCE: NORMAL
TIBC SERPL-MCNC: 259 UG/DL (ref 250–450)
TROPONIN I SERPL-MCNC: 0.3 NG/ML (ref 0–0.04)
TROPONIN I SERPL-MCNC: 0.33 NG/ML (ref 0–0.04)

## 2017-04-25 PROCEDURE — 87205 SMEAR GRAM STAIN: CPT

## 2017-04-25 PROCEDURE — 99291 CRITICAL CARE FIRST HOUR: CPT | Performed by: HOSPITALIST

## 2017-04-25 PROCEDURE — 87070 CULTURE OTHR SPECIMN AEROBIC: CPT

## 2017-04-25 PROCEDURE — 87077 CULTURE AEROBIC IDENTIFY: CPT

## 2017-04-25 PROCEDURE — 82962 GLUCOSE BLOOD TEST: CPT | Mod: 91

## 2017-04-25 PROCEDURE — 87641 MR-STAPH DNA AMP PROBE: CPT

## 2017-04-25 PROCEDURE — 700102 HCHG RX REV CODE 250 W/ 637 OVERRIDE(OP): Performed by: HOSPITALIST

## 2017-04-25 PROCEDURE — A9270 NON-COVERED ITEM OR SERVICE: HCPCS | Performed by: HOSPITALIST

## 2017-04-25 PROCEDURE — A9270 NON-COVERED ITEM OR SERVICE: HCPCS | Performed by: EMERGENCY MEDICINE

## 2017-04-25 PROCEDURE — 700111 HCHG RX REV CODE 636 W/ 250 OVERRIDE (IP): Performed by: HOSPITALIST

## 2017-04-25 PROCEDURE — 96366 THER/PROPH/DIAG IV INF ADDON: CPT

## 2017-04-25 PROCEDURE — 87186 SC STD MICRODIL/AGAR DIL: CPT

## 2017-04-25 PROCEDURE — 84484 ASSAY OF TROPONIN QUANT: CPT

## 2017-04-25 PROCEDURE — 700102 HCHG RX REV CODE 250 W/ 637 OVERRIDE(OP): Performed by: EMERGENCY MEDICINE

## 2017-04-25 PROCEDURE — 700101 HCHG RX REV CODE 250: Performed by: INTERNAL MEDICINE

## 2017-04-25 PROCEDURE — 83540 ASSAY OF IRON: CPT

## 2017-04-25 PROCEDURE — 87640 STAPH A DNA AMP PROBE: CPT

## 2017-04-25 PROCEDURE — 94640 AIRWAY INHALATION TREATMENT: CPT

## 2017-04-25 PROCEDURE — 82533 TOTAL CORTISOL: CPT

## 2017-04-25 PROCEDURE — 93005 ELECTROCARDIOGRAM TRACING: CPT | Performed by: EMERGENCY MEDICINE

## 2017-04-25 PROCEDURE — 770022 HCHG ROOM/CARE - ICU (200)

## 2017-04-25 PROCEDURE — 96367 TX/PROPH/DG ADDL SEQ IV INF: CPT

## 2017-04-25 PROCEDURE — 83550 IRON BINDING TEST: CPT

## 2017-04-25 PROCEDURE — 80048 BASIC METABOLIC PNL TOTAL CA: CPT

## 2017-04-25 PROCEDURE — 83605 ASSAY OF LACTIC ACID: CPT

## 2017-04-25 PROCEDURE — 700101 HCHG RX REV CODE 250: Performed by: HOSPITALIST

## 2017-04-25 PROCEDURE — 700105 HCHG RX REV CODE 258: Performed by: HOSPITALIST

## 2017-04-25 PROCEDURE — 700111 HCHG RX REV CODE 636 W/ 250 OVERRIDE (IP): Performed by: EMERGENCY MEDICINE

## 2017-04-25 RX ORDER — TRAZODONE HYDROCHLORIDE 50 MG/1
75 TABLET ORAL
Status: DISCONTINUED | OUTPATIENT
Start: 2017-04-25 | End: 2017-05-04 | Stop reason: HOSPADM

## 2017-04-25 RX ORDER — ONDANSETRON 2 MG/ML
4 INJECTION INTRAMUSCULAR; INTRAVENOUS EVERY 4 HOURS PRN
Status: DISCONTINUED | OUTPATIENT
Start: 2017-04-25 | End: 2017-05-04 | Stop reason: HOSPADM

## 2017-04-25 RX ORDER — IPRATROPIUM BROMIDE AND ALBUTEROL SULFATE 2.5; .5 MG/3ML; MG/3ML
3 SOLUTION RESPIRATORY (INHALATION)
Status: DISCONTINUED | OUTPATIENT
Start: 2017-04-25 | End: 2017-04-27

## 2017-04-25 RX ORDER — LISINOPRIL 20 MG/1
40 TABLET ORAL DAILY
Status: DISCONTINUED | OUTPATIENT
Start: 2017-04-25 | End: 2017-04-25

## 2017-04-25 RX ORDER — BISACODYL 10 MG
10 SUPPOSITORY, RECTAL RECTAL
Status: DISCONTINUED | OUTPATIENT
Start: 2017-04-25 | End: 2017-05-04 | Stop reason: HOSPADM

## 2017-04-25 RX ORDER — OXYCODONE HYDROCHLORIDE 10 MG/1
10-20 TABLET ORAL EVERY 6 HOURS PRN
Status: DISCONTINUED | OUTPATIENT
Start: 2017-04-25 | End: 2017-04-25

## 2017-04-25 RX ORDER — ACETAMINOPHEN 325 MG/1
650 TABLET ORAL ONCE
Status: COMPLETED | OUTPATIENT
Start: 2017-04-25 | End: 2017-04-25

## 2017-04-25 RX ORDER — OXYCODONE HYDROCHLORIDE 10 MG/1
10 TABLET ORAL EVERY 6 HOURS PRN
Status: DISCONTINUED | OUTPATIENT
Start: 2017-04-25 | End: 2017-05-04 | Stop reason: HOSPADM

## 2017-04-25 RX ORDER — SODIUM CHLORIDE 9 MG/ML
30 INJECTION, SOLUTION INTRAVENOUS
Status: DISCONTINUED | OUTPATIENT
Start: 2017-04-25 | End: 2017-04-26

## 2017-04-25 RX ORDER — LORAZEPAM 2 MG/ML
0.5 INJECTION INTRAMUSCULAR EVERY 6 HOURS PRN
Status: DISCONTINUED | OUTPATIENT
Start: 2017-04-25 | End: 2017-05-04 | Stop reason: HOSPADM

## 2017-04-25 RX ORDER — ASCORBIC ACID 500 MG
500 TABLET ORAL
Status: DISCONTINUED | OUTPATIENT
Start: 2017-04-25 | End: 2017-05-04 | Stop reason: HOSPADM

## 2017-04-25 RX ORDER — DULOXETIN HYDROCHLORIDE 30 MG/1
60 CAPSULE, DELAYED RELEASE ORAL DAILY
Status: DISCONTINUED | OUTPATIENT
Start: 2017-04-25 | End: 2017-05-04 | Stop reason: HOSPADM

## 2017-04-25 RX ORDER — LEVOTHYROXINE SODIUM 0.07 MG/1
75 TABLET ORAL
Status: DISCONTINUED | OUTPATIENT
Start: 2017-04-25 | End: 2017-05-04 | Stop reason: HOSPADM

## 2017-04-25 RX ORDER — HEPARIN SODIUM 5000 [USP'U]/ML
5000 INJECTION, SOLUTION INTRAVENOUS; SUBCUTANEOUS EVERY 8 HOURS
Status: DISCONTINUED | OUTPATIENT
Start: 2017-04-25 | End: 2017-04-25

## 2017-04-25 RX ORDER — ONDANSETRON 4 MG/1
4 TABLET, ORALLY DISINTEGRATING ORAL EVERY 4 HOURS PRN
Status: DISCONTINUED | OUTPATIENT
Start: 2017-04-25 | End: 2017-05-04 | Stop reason: HOSPADM

## 2017-04-25 RX ORDER — MONTELUKAST SODIUM 10 MG/1
10 TABLET ORAL DAILY
Status: DISCONTINUED | OUTPATIENT
Start: 2017-04-25 | End: 2017-05-04 | Stop reason: HOSPADM

## 2017-04-25 RX ORDER — AMOXICILLIN 250 MG
2 CAPSULE ORAL 2 TIMES DAILY
Status: DISCONTINUED | OUTPATIENT
Start: 2017-04-25 | End: 2017-05-04 | Stop reason: HOSPADM

## 2017-04-25 RX ORDER — TIOTROPIUM BROMIDE 18 UG/1
1 CAPSULE ORAL; RESPIRATORY (INHALATION) DAILY
Status: DISCONTINUED | OUTPATIENT
Start: 2017-04-25 | End: 2017-05-04 | Stop reason: HOSPADM

## 2017-04-25 RX ORDER — POLYETHYLENE GLYCOL 3350 17 G/17G
1 POWDER, FOR SOLUTION ORAL
Status: DISCONTINUED | OUTPATIENT
Start: 2017-04-25 | End: 2017-05-04 | Stop reason: HOSPADM

## 2017-04-25 RX ORDER — DEXTROSE MONOHYDRATE 25 G/50ML
25 INJECTION, SOLUTION INTRAVENOUS
Status: DISCONTINUED | OUTPATIENT
Start: 2017-04-25 | End: 2017-05-04 | Stop reason: HOSPADM

## 2017-04-25 RX ORDER — AMLODIPINE BESYLATE 5 MG/1
10 TABLET ORAL DAILY
Status: DISCONTINUED | OUTPATIENT
Start: 2017-04-25 | End: 2017-04-25

## 2017-04-25 RX ORDER — OXYCODONE HYDROCHLORIDE 10 MG/1
20 TABLET ORAL EVERY 6 HOURS PRN
Status: DISCONTINUED | OUTPATIENT
Start: 2017-04-25 | End: 2017-05-04 | Stop reason: HOSPADM

## 2017-04-25 RX ORDER — LORAZEPAM 0.5 MG/1
0.5 TABLET ORAL EVERY 6 HOURS PRN
Status: DISCONTINUED | OUTPATIENT
Start: 2017-04-25 | End: 2017-05-04 | Stop reason: HOSPADM

## 2017-04-25 RX ORDER — SIMVASTATIN 20 MG
10 TABLET ORAL EVERY EVENING
Status: DISCONTINUED | OUTPATIENT
Start: 2017-04-25 | End: 2017-05-04 | Stop reason: HOSPADM

## 2017-04-25 RX ORDER — SODIUM CHLORIDE 9 MG/ML
INJECTION, SOLUTION INTRAVENOUS CONTINUOUS
Status: DISCONTINUED | OUTPATIENT
Start: 2017-04-25 | End: 2017-04-26

## 2017-04-25 RX ORDER — FAMOTIDINE 20 MG/1
20 TABLET, FILM COATED ORAL DAILY
Status: DISCONTINUED | OUTPATIENT
Start: 2017-04-25 | End: 2017-05-04 | Stop reason: HOSPADM

## 2017-04-25 RX ORDER — SODIUM CHLORIDE 9 MG/ML
1000 INJECTION, SOLUTION INTRAVENOUS
Status: DISCONTINUED | OUTPATIENT
Start: 2017-04-25 | End: 2017-04-26

## 2017-04-25 RX ORDER — BUDESONIDE AND FORMOTEROL FUMARATE DIHYDRATE 160; 4.5 UG/1; UG/1
2 AEROSOL RESPIRATORY (INHALATION)
Status: DISCONTINUED | OUTPATIENT
Start: 2017-04-25 | End: 2017-04-27

## 2017-04-25 RX ORDER — GABAPENTIN 300 MG/1
600 CAPSULE ORAL 3 TIMES DAILY
Status: DISCONTINUED | OUTPATIENT
Start: 2017-04-25 | End: 2017-05-04 | Stop reason: HOSPADM

## 2017-04-25 RX ORDER — IPRATROPIUM BROMIDE AND ALBUTEROL SULFATE 2.5; .5 MG/3ML; MG/3ML
3 SOLUTION RESPIRATORY (INHALATION)
Status: DISCONTINUED | OUTPATIENT
Start: 2017-04-25 | End: 2017-05-04 | Stop reason: HOSPADM

## 2017-04-25 RX ADMIN — OXYCODONE HYDROCHLORIDE 10 MG: 10 TABLET ORAL at 08:38

## 2017-04-25 RX ADMIN — HYDROCORTISONE SODIUM SUCCINATE 100 MG: 100 INJECTION, POWDER, FOR SOLUTION INTRAMUSCULAR; INTRAVENOUS at 20:03

## 2017-04-25 RX ADMIN — BUDESONIDE AND FORMOTEROL FUMARATE DIHYDRATE 2 PUFF: 160; 4.5 AEROSOL RESPIRATORY (INHALATION) at 18:34

## 2017-04-25 RX ADMIN — GABAPENTIN 600 MG: 300 CAPSULE ORAL at 14:44

## 2017-04-25 RX ADMIN — HEPARIN SODIUM 5000 UNITS: 5000 INJECTION, SOLUTION INTRAVENOUS; SUBCUTANEOUS at 05:56

## 2017-04-25 RX ADMIN — GABAPENTIN 600 MG: 300 CAPSULE ORAL at 08:38

## 2017-04-25 RX ADMIN — TIOTROPIUM BROMIDE 1 CAPSULE: 18 CAPSULE ORAL; RESPIRATORY (INHALATION) at 08:35

## 2017-04-25 RX ADMIN — PIPERACILLIN SODIUM AND TAZOBACTAM SODIUM 3.38 G: 3; .375 INJECTION, POWDER, FOR SOLUTION INTRAVENOUS at 17:06

## 2017-04-25 RX ADMIN — SODIUM CHLORIDE: 9 INJECTION, SOLUTION INTRAVENOUS at 03:15

## 2017-04-25 RX ADMIN — PIPERACILLIN SODIUM AND TAZOBACTAM SODIUM 3.38 G: 3; .375 INJECTION, POWDER, FOR SOLUTION INTRAVENOUS at 05:55

## 2017-04-25 RX ADMIN — INSULIN HUMAN 10 UNITS: 100 INJECTION, SUSPENSION SUBCUTANEOUS at 09:08

## 2017-04-25 RX ADMIN — NOREPINEPHRINE BITARTRATE 7 MCG/MIN: 1 INJECTION INTRAVENOUS at 12:28

## 2017-04-25 RX ADMIN — FAMOTIDINE 20 MG: 20 TABLET, FILM COATED ORAL at 08:36

## 2017-04-25 RX ADMIN — BUDESONIDE AND FORMOTEROL FUMARATE DIHYDRATE 2 PUFF: 160; 4.5 AEROSOL RESPIRATORY (INHALATION) at 08:34

## 2017-04-25 RX ADMIN — OXYCODONE HYDROCHLORIDE 10 MG: 10 TABLET ORAL at 20:03

## 2017-04-25 RX ADMIN — SODIUM CHLORIDE: 9 INJECTION, SOLUTION INTRAVENOUS at 12:29

## 2017-04-25 RX ADMIN — HYDROCORTISONE SODIUM SUCCINATE 100 MG: 100 INJECTION, POWDER, FOR SOLUTION INTRAMUSCULAR; INTRAVENOUS at 14:44

## 2017-04-25 RX ADMIN — VASOPRESSIN 0.03 UNITS/MIN: 20 INJECTION INTRAVENOUS at 01:50

## 2017-04-25 RX ADMIN — INSULIN LISPRO 10 UNITS: 100 INJECTION, SOLUTION INTRAVENOUS; SUBCUTANEOUS at 12:22

## 2017-04-25 RX ADMIN — ACETAMINOPHEN 650 MG: 325 TABLET, FILM COATED ORAL at 00:36

## 2017-04-25 RX ADMIN — SIMVASTATIN 10 MG: 20 TABLET, FILM COATED ORAL at 20:02

## 2017-04-25 RX ADMIN — TRAZODONE HYDROCHLORIDE 75 MG: 50 TABLET ORAL at 21:07

## 2017-04-25 RX ADMIN — NOREPINEPHRINE BITARTRATE 30 MCG/MIN: 1 INJECTION INTRAVENOUS at 04:09

## 2017-04-25 RX ADMIN — HYDROCORTISONE SODIUM SUCCINATE 100 MG: 100 INJECTION, POWDER, FOR SOLUTION INTRAMUSCULAR; INTRAVENOUS at 08:55

## 2017-04-25 RX ADMIN — THERA TABS 1 TABLET: TAB at 08:40

## 2017-04-25 RX ADMIN — INSULIN HUMAN 10 UNITS: 100 INJECTION, SUSPENSION SUBCUTANEOUS at 17:09

## 2017-04-25 RX ADMIN — INSULIN LISPRO 4 UNITS: 100 INJECTION, SOLUTION INTRAVENOUS; SUBCUTANEOUS at 05:56

## 2017-04-25 RX ADMIN — DULOXETINE HYDROCHLORIDE 60 MG: 60 CAPSULE, DELAYED RELEASE ORAL at 08:45

## 2017-04-25 RX ADMIN — INSULIN LISPRO 12 UNITS: 100 INJECTION, SOLUTION INTRAVENOUS; SUBCUTANEOUS at 21:12

## 2017-04-25 RX ADMIN — LEVOTHYROXINE SODIUM 75 MCG: 75 TABLET ORAL at 05:56

## 2017-04-25 RX ADMIN — SODIUM CHLORIDE 2267 ML: 9 INJECTION, SOLUTION INTRAVENOUS at 00:00

## 2017-04-25 RX ADMIN — GABAPENTIN 600 MG: 300 CAPSULE ORAL at 20:02

## 2017-04-25 RX ADMIN — VASOPRESSIN 0.03 UNITS/MIN: 20 INJECTION INTRAVENOUS at 12:30

## 2017-04-25 RX ADMIN — PIPERACILLIN SODIUM AND TAZOBACTAM SODIUM 3.38 G: 3; .375 INJECTION, POWDER, FOR SOLUTION INTRAVENOUS at 12:21

## 2017-04-25 RX ADMIN — IPRATROPIUM BROMIDE AND ALBUTEROL SULFATE 3 ML: .5; 3 SOLUTION RESPIRATORY (INHALATION) at 18:32

## 2017-04-25 RX ADMIN — INSULIN LISPRO 7 UNITS: 100 INJECTION, SOLUTION INTRAVENOUS; SUBCUTANEOUS at 17:09

## 2017-04-25 RX ADMIN — MONTELUKAST SODIUM 10 MG: 10 TABLET, FILM COATED ORAL at 08:39

## 2017-04-25 ASSESSMENT — LIFESTYLE VARIABLES: EVER_SMOKED: YES

## 2017-04-25 ASSESSMENT — PAIN SCALES - GENERAL
PAINLEVEL_OUTOF10: 5
PAINLEVEL_OUTOF10: 3
PAINLEVEL_OUTOF10: 5
PAINLEVEL_OUTOF10: 0
PAINLEVEL_OUTOF10: 2
PAINLEVEL_OUTOF10: 9
PAINLEVEL_OUTOF10: 8
PAINLEVEL_OUTOF10: 5
PAINLEVEL_OUTOF10: 4
PAINLEVEL_OUTOF10: 3

## 2017-04-25 ASSESSMENT — ENCOUNTER SYMPTOMS
HEADACHES: 0
COUGH: 1
SHORTNESS OF BREATH: 1
NAUSEA: 0
LOSS OF CONSCIOUSNESS: 0
VOMITING: 0
BACK PAIN: 1
DIARRHEA: 0
FEVER: 0
CHILLS: 0
ABDOMINAL PAIN: 0
DIZZINESS: 0

## 2017-04-25 ASSESSMENT — COPD QUESTIONNAIRES
DO YOU EVER COUGH UP ANY MUCUS OR PHLEGM?: YES, EVERY DAY
COPD SCREENING SCORE: 7
HAVE YOU SMOKED AT LEAST 100 CIGARETTES IN YOUR ENTIRE LIFE: NO/DON'T KNOW
DURING THE PAST 4 WEEKS HOW MUCH DID YOU FEEL SHORT OF BREATH: MOST  OR ALL OF THE TIME

## 2017-04-25 NOTE — PROGRESS NOTES
Hospital Medicine Progress Note, Adult, Complex               Author: Jm Trejo Date & Time created: 4/25/2017  8:19 AM     Interval History:  65yo with multiple medical problems (COPD, CKD III, HFPLVEF, DM, HTN) found down at home altered and hypoxic.  Admitted with Dx of HCAP, sepsis     This am feeling better.  Still SOB and with productive cough.  Some back pain but what is normal for her.  No C/O CP, or pressure.    Remains on Levo, Vaso overnight  7 litres mask    Review of Systems:  Review of Systems   Constitutional: Negative for fever and chills.   Respiratory: Positive for cough and shortness of breath.    Cardiovascular: Negative for chest pain.   Gastrointestinal: Negative for nausea, vomiting, abdominal pain and diarrhea.   Musculoskeletal: Positive for back pain.   Skin: Negative for rash.   Neurological: Negative for dizziness, loss of consciousness and headaches.       Physical Exam:  Physical Exam   Constitutional: She is oriented to person, place, and time. She appears well-developed and well-nourished. No distress.   HENT:   Head: Normocephalic and atraumatic.   Neck: No JVD present.   Cardiovascular: Normal rate and regular rhythm.    Murmur heard.  Pulmonary/Chest: Effort normal. No stridor. No respiratory distress. She has no wheezes. She has rales.   Abdominal: Soft. There is no tenderness. There is no rebound and no guarding.   Musculoskeletal: She exhibits edema.   Trace to 1+ edema   Neurological: She is oriented to person, place, and time.   Skin: Skin is warm and dry. No rash noted. She is not diaphoretic.   Psychiatric: She has a normal mood and affect. Thought content normal.   Nursing note and vitals reviewed.      Labs:  Recent Labs      04/24/17   2300   YZOYV13C  7.37*   [id number]  41.6*   [id number]  130.9*   SKSD7DKS  98.0   ARTHCO3  23   ARTBE  -2     Recent Labs      04/24/17   2211  04/25/17   0415   CKMB  5.8*   --    TROPONINI  0.09*  0.30*   BNPBTYPENAT  308*    --      Recent Labs      175   SODIUM  136  137   POTASSIUM  5.8*  4.9   CHLORIDE  104  110   CO2  24  21   BUN  40*  32*   CREATININE  1.96*  1.52*   CALCIUM  8.9  7.7*     Recent Labs      175   ALTSGPT  18   --    ASTSGOT  20   --    ALKPHOSPHAT  66   --    TBILIRUBIN  0.7   --    GLUCOSE  205*  234*     Recent Labs      17   RBC  2.83*   HEMOGLOBIN  8.0*   HEMATOCRIT  26.2*   PLATELETCT  216   PROTHROMBTM  14.3   INR  1.08     Recent Labs      17   WBC  12.1*   NEUTSPOLYS  83.30*   LYMPHOCYTES  7.90*   MONOCYTES  1.80   EOSINOPHILS  0.90   BASOPHILS  1.70   ASTSGOT  20   ALTSGPT  18   ALKPHOSPHAT  66   TBILIRUBIN  0.7           Hemodynamics:  Temp (24hrs), Av.4 °C (99.4 °F), Min:36.8 °C (98.2 °F), Max:38.2 °C (100.8 °F)  Temperature: 36.8 °C (98.2 °F)  Pulse  Av.7  Min: 63  Max: 92Heart Rate (Monitored): 73  Blood Pressure : (!) 93/27 mmHg, NIBP: 143/45 mmHg     Respiratory:    Respiration: (!) 24, Pulse Oximetry: 91 %, O2 Daily Delivery Respiratory : OxyMask     Given By:: Mouthpiece, Work Of Breathing / Effort: Mild  RUL Breath Sounds: Expiratory Wheezes, RML Breath Sounds: Diminished, RLL Breath Sounds: Diminished, BIRD Breath Sounds: Expiratory Wheezes, LLL Breath Sounds: Diminished  Fluids:    Intake/Output Summary (Last 24 hours) at 17 0819  Last data filed at 17 0600   Gross per 24 hour   Intake  403.1 ml   Output    650 ml   Net -246.9 ml     Weight: 102.6 kg (226 lb 3.1 oz)  GI/Nutrition:  Orders Placed This Encounter   Procedures   • Diet Order     Standing Status: Standing      Number of Occurrences: 1      Standing Expiration Date:      Order Specific Question:  Diet:     Answer:  Diabetic [3]     Medical Decision Making, by Problem:  Active Hospital Problems    Diagnosis   • Hypertensive heart disease with heart failure (CMS-HCC) [I11.0]  GD II diastolic dysfunction with preserved LVEF  On ACEI,  BB as outpt; holding due to sepsis  Follow volume status closely   • HCAP (healthcare-associated pneumonia) [J18.9]  Abx's as noted below   • COPD (chronic obstructive pulmonary disease) (CMS-HCC) [J44.9]  Baseline 3.5 litres at home   • DM type 2, uncontrolled, with renal complications (CMS-HCC) [E11.29, E11.65]  7.9 A1c earlier this year  On lantus and SSI as outpt  Cont SSI  Resume lantus once taking po  Follow and titrate   • Iron deficiency anemia [D50.9]  Repeat Fe studies   • HTN (hypertension) [I10]  Not an active issue  On HCTZ 25, amlodipine 10, coreg 3.125, lisinopril 40 as outpt   • Sepsis (CMS-HCC) [A41.9]  Respiratory source  Recent health care exposure vs aspiration  SLP eval  Vanc/Zosyn  Check MRSA nares; dc vanc if neg  Follow cultures  Cont to titrate Levo, Vaso  Follow CVP and MAP  Add hydrocortisone given  Chronic steroid exposure   • Elevated troponin [R79.89]  No significant EKG changes  Likely demand ischemia  Pt is ASx'c  Cont to follow   • EDITH (obstructive sleep apnea) [G47.33]   • Chronic kidney disease, stage 3 [N18.3]  HAN resolved with IVF's  Back to her baseline Creat  Cont to follow daily  Avoid Nephrotoxins     Critical care time 40 min's managing sepsis, HCAP, fluids, pressors, following CVP, MAP, serial lab    Medications reviewed, EKG reviewed, Labs reviewed and Radiology images reviewed        DVT Prophylaxis: Heparin  DVT prophylaxis - mechanical: SCDs  Ulcer prophylaxis: Yes  Antibiotics: Treating active infection/contamination beyond 24 hours perioperative coverage

## 2017-04-25 NOTE — CARE PLAN
Problem: Venous Thromboembolism (VTW)/Deep Vein Thrombosis (DVT) Prevention:  Goal: Patient will participate in Venous Thrombosis (VTE)/Deep Vein Thrombosis (DVT)Prevention Measures  Intervention: Ensure patient wears graduated elastic stockings (CLAUDIA hose) and/or SCDs, if ordered, when in bed or chair (Remove at least once per shift for skin check)  Kept intermittent sequential devices in place and on at all times except during skin assessment and bed bath.       Problem: Pain Management  Goal: Pain level will decrease to patient’s comfort goal  Intervention: Educate and implement non-pharmacologic comfort measures. Examples: relaxation, distration, play therapy, activity therapy, massage, etc.  Strictly adhered to pain assessments at least every 4 hours, used previously stated non-pharmacologic pain management techniques to provide a multi-modal approach to keep pain under control and within comfort standards set by patient.

## 2017-04-25 NOTE — ED NOTES
Mckeon catheter inserted per orders using sterile technique. Patient tolerated well without complaints of pain. Cloudy dark yellow urine returned. Urine specimen collected per orders and sent to lab.

## 2017-04-25 NOTE — ED NOTES
"Priya Callahan  66 y.o.  Chief Complaint   Patient presents with   • Shortness of Breath     speaking 2-3 words at a time, hx. COPD, PNA   • Hypoxemia     74% on room air with central+peripheral cyanosis on scene       BIB EMS from [place] for above complaints. Patient wears home oxygen 3.5L NC continuous - was not hooked up when EMS arrived on scene. Patient states that this \"feels like the pneumonia I've had in the past.\"    Received albuterol x 1 en route by EMS.  "

## 2017-04-25 NOTE — ED NOTES
Patient coughed up large amount of think reddish sputum. Dr. Caballero notified. Sputum sample sent to labs per orders.

## 2017-04-25 NOTE — PROGRESS NOTES
12 hour chart check    Pt arrived at 0515, report at bedside in ED, oriented and comfortable, no complaints other than hunger. On Pressors, weaning down.

## 2017-04-25 NOTE — PROGRESS NOTES
Pt refusing more than 1 SQ shot for DVT prophylaxis per day.  Dr. Granados updated and changed to renal adjusted dose of Lovenox. Pt stated she will take that dose tomorrow.

## 2017-04-25 NOTE — PROGRESS NOTES
Med rec updated per pt's home pharmacy  [facility] in [place] @ [phone]  Pt did not know her medications, all recent   Fills from pharmacy left on med rec

## 2017-04-25 NOTE — PROGRESS NOTES
"Pharmacy Kinetics 66 y.o. female on vancomycin day # 1 2017    Currently on Vancomycin 2700 mg iv load    Indication for Treatment: Pneumonia    Pertinent history per medical record: Admitted on 2017 for sepsis secondary to pneumonia.  Patient was found lethargic with an O2 saturation of 74%.  She has been coughing with yellow sputum production.  Chest x ray shows pulmonary infiltrates.  Empiric antibiotics initiated.      Other antibiotics: Zosyn 3.375g q6 hours    Allergies: Keflex; Codeine; Lyrica; and Sudafed     List concerns for renal function: BMI 41, hypotensive with pressors, BUN/SCr > 20:1    Pertinent cultures to date:   Blood culture x2 -- in process  Urine culture -- in process    Recent Labs      17   2211   WBC  12.1*   NEUTSPOLYS  83.30*   BANDSSTABS  2.60     Recent Labs      17   2211   BUN  40*   CREATININE  1.96*   ALBUMIN  3.0*     No results for input(s): VANCOTROUGH, VANCOPEAK, VANCORANDOM in the last 72 hours.No intake or output data in the 24 hours ending 17 0129   Blood pressure 93/27, pulse 87, temperature 38.2 °C (100.8 °F), resp. rate 21, height 1.626 m (5' 4\"), weight 108.863 kg (240 lb), SpO2 91 %. Temp (24hrs), Av.7 °C (99.8 °F), Min:37.1 °C (98.8 °F), Max:38.2 °C (100.8 °F)      A/P   1. Vancomycin dose change: New start  2. Next vancomycin level:  @ 1400  3. Goal trough: 16-20 mcg/mL  4. Comments: patient has multiple risk factors for accumulation so I will order a level ~12 hours post loading dose to assess clearance.  Clinical pharmacist to follow and order further dosing.      Chris Borja, PHARMD        "

## 2017-04-25 NOTE — CARE PLAN
Problem: Safety  Goal: Will remain free from injury  Outcome: PROGRESSING AS EXPECTED  Pt oriented to room, uses call light appropriately    Problem: Venous Thromboembolism (VTW)/Deep Vein Thrombosis (DVT) Prevention:  Goal: Patient will participate in Venous Thrombosis (VTE)/Deep Vein Thrombosis (DVT)Prevention Measures  Outcome: PROGRESSING AS EXPECTED  Pt educated on heparin and why we uses, more tolerable to treatment.

## 2017-04-25 NOTE — ED NOTES
Patient noted to be intermittently falling asleep - loud snoring noted when asleep with accompanying drop in oxygen saturation. Discussed with RT.

## 2017-04-25 NOTE — ED NOTES
Report given at bedside to ICU RN Angel.    Patient transported to ICU via gurney accompanied by ICU RN. All belongings accounted for.

## 2017-04-25 NOTE — ED PROVIDER NOTES
ED Provider Note    Scribed for Tree Caballero M.D. by Trisha Porter. 4/24/2017, 9:50 PM.    Primary care provider: Mickie Lawson M.D.  Means of arrival: Ambulance  History obtained from: Patient  History limited by: None    CHIEF COMPLAINT  Chief Complaint   Patient presents with   • Shortness of Breath     speaking 2-3 words at a time, hx. COPD, PNA   • Hypoxemia     74% on room air with central+peripheral cyanosis on scene     HPI  Priya Callahan is a 66 y.o. Female with an extensive medical history who presents to the Emergency Department with hypoxemia onset approximately two hours prior to my examination.  The patient lives at home and requires 3.5 liters of supplemental oxygen.  However, during a welfare check the patient was found without her oxygen connected.  At that time she had central and peripheral cyanosis, saturating at 74% while on room air.  The patient was then transferred to this ED.  She received one dose of Albuterol prior to arrival and supplemental oxygen while en route.  Currently, the patient feels improved but remains in respiratory distress.  Per nurse's note, the patient compares her current symptoms to when she suffered from pneumonia in the past.  The patient denies associated cough, congestion, and chest pain.  She also denies lightheadedness, dizziness, and weakness.  The patient has not suffered from recent fevers, chills, nausea, vomiting, diarrhea, dysuria, and hematuria.  The patient's medical history includes COPD, CHF, diabetes, hypertension, and thyroid disease. She is allergic to Codeine, Keflex, Lyrica, and Sudafed. The patient denies additional symptoms or further pertinent medical history.     REVIEW OF SYSTEMS  Review of Systems   Constitutional: Negative for fever and chills.   HENT: Negative for congestion.    Respiratory: Positive for shortness of breath. Negative for cough.    Cardiovascular: Negative for chest pain.   Gastrointestinal: Negative  for nausea, vomiting, abdominal pain and diarrhea.   Genitourinary: Negative for dysuria.   Neurological: Negative for dizziness and weakness.   All other systems reviewed and are negative.  C.     PAST MEDICAL HISTORY   has a past medical history of COPD; ASTHMA; Hypertension; Pneumonia; Bronchitis; Unspecified urinary incontinence; Fall; Infectious disease; Arthritis; Fibromyalgia; Neuropathy (CMS-HCC); Other specified symptom associated with female genital organs; Diabetes; Hepatitis C; Congestive heart failure (CMS-HCC) (2013); Indigestion; GERD (gastroesophageal reflux disease); Breath shortness; Sleep apnea; Disorder of thyroid; Backpain; Heart burn; Pain (9/13/2016); Psychiatric problem (9/13/2016); and On home oxygen therapy.    SURGICAL HISTORY   has past surgical history that includes gyn surgery; other orthopedic surgery; other orthopedic surgery; us-needle core bx-breast panel; lumpectomy (Left); inj dx/ther agnt paravert facet joint, bruce* (Left, 7/10/2015); inj dx/ther agnt paravert facet joint, bruce* (7/10/2015); inj dx/ther agnt paravert facet joint, bruce* (7/10/2015); inject nerv blck,othr periph nerv (7/10/2015); inj dx/ther agnt paravert facet joint, bruce* (Left, 7/17/2015); inj dx/ther agnt paravert facet joint, bruce* (7/17/2015); inj dx/ther agnt paravert facet joint, bruce* (7/17/2015); inject nerv blck,othr periph nerv (7/17/2015); dstr nrolytc agnt parverteb fct sngl lmbr/sacral (Left, 10/2/2015); dstr nrolytc agnt parverteb fct addl lmbr/sacral (10/2/2015); inject rx other periph nerve (10/2/2015); dstr nrolytc agnt parverteb fct addl lmbr/sacral (10/2/2015); dstr nrolytc agnt parverteb fct sngl lmbr/sacral (Right, 11/6/2015); dstr nrolytc agnt parverteb fct addl lmbr/sacral (11/6/2015); inject rx other periph nerve (11/6/2015); dstr nrolytc agnt parverteb fct addl lmbr/sacral (11/6/2015); dstr nrolytc agnt parverteb fct sngl lmbr/sacral (Left, 9/16/2016); dstr nrolytc agnt parverteb fct addl lmbr/sacral  (9/16/2016); dstr nrolytc agnt parverteb fct addl lmbr/sacral (9/16/2016); and fluoroscopic guidance needle placement (9/16/2016).    SOCIAL HISTORY  Social History   Substance Use Topics   • Smoking status: Former Smoker -- 1.00 packs/day for 50 years     Types: Cigarettes     Quit date: 02/01/2017   • Smokeless tobacco: Never Used   • Alcohol Use: No      History   Drug Use No     FAMILY HISTORY  Family History   Problem Relation Age of Onset   • Lung Disease Mother    • Alcohol/Drug Mother    • Heart Disease Mother    • Cancer Father    • Cancer Brother    • Diabetes Maternal Grandmother    • Stroke Paternal Grandmother      CURRENT MEDICATIONS  Home Medications     Reviewed by Maia Calero R.N. (Registered Nurse) on 04/24/17 at 2144  Med List Status: Partial    Medication Last Dose Status    albuterol (PROVENTIL) 2.5mg/3ml Nebu Soln solution for nebulization unknown Active    albuterol-ipratropium (COMBIVENT)  MCG/ACT Aerosol unknown Active    amlodipine (NORVASC) 10 MG Tab  Active    ascorbic acid (VITAMIN C) 500 MG tablet  Active    carvedilol (COREG) 3.125 MG Tab  Active    cyclobenzaprine (FLEXERIL) 10 MG Tab unknown Active    duloxetine (CYMBALTA) 60 MG Cap DR Particles delayed-release capsule unknown Active    escitalopram (LEXAPRO) 10 MG Tab unknown Active    ferrous gluconate (FERGON) 324 (38 FE) MG Tab  Active    fluticasone-salmeterol (ADVAIR) 500-50 MCG/DOSE AEROSOL POWDER, BREATH ACTIVATED unknown Active    gabapentin (NEURONTIN) 600 MG tablet unknown Active    hydrochlorothiazide (HYDRODIURIL) 25 MG Tab  Active    insulin glargine (LANTUS) 100 UNIT/ML Solution unknown Active    ipratropium-albuterol (COMBIVENT RESPIMAT)  MCG/ACT Aero Soln  Active    lactulose 10 GM/15ML Solution unknown Active    levothyroxine (SYNTHROID) 75 MCG Tab unknown Active    lisinopril (PRINIVIL, ZESTRIL) 40 MG tablet  Active    montelukast (SINGULAIR) 10 MG Tab unknown Active    multivitamin (THERAGRAN) Tab   "Active    omeprazole (PRILOSEC) 20 MG delayed-release capsule unknown Active    oxycodone immediate release (ROXICODONE) 10 MG immediate release tablet unknown Active    potassium chloride SA (KDUR) 20 MEQ Tab CR  Active    predniSONE (DELTASONE) 10 MG Tab  Active    simvastatin (ZOCOR) 10 MG Tab unknown Active    tiotropium (SPIRIVA) 18 MCG Cap unknown Active    trazodone (DESYREL) 100 MG Tab unknown Active              ALLERGIES  Allergies   Allergen Reactions   • Keflex Rash     Severe rash, has tolerated Augmentin and Zosyn (as of April 2017)   • Codeine Nausea     Severe stomach pain   • Lyrica Nausea     Nausea, dizzy   • Sudafed Nausea and Unspecified     Chest pain, nausea     PHYSICAL EXAM  VITAL SIGNS: BP 93/27 mmHg  Pulse 63  Temp(Src) 37.1 °C (98.8 °F)  Resp 16  Ht 1.626 m (5' 4\")  Wt 108.863 kg (240 lb)  BMI 41.18 kg/m2  SpO2 98%    Constitutional: Well developed, Well nourished, Slight Respiratory distress.   HENT: Normocephalic, Atraumatic, Oropharynx moist.   Eyes: Conjunctiva normal, No discharge.   Neck: Supple, No stridor,   Cardiovascular: Normal heart rate, Normal rhythm, No murmurs, equal pulses.   Pulmonary: Diminished breath sounds bilaterally, No wheezing, No rales, No rhonchi.  Chest: No chest wall tenderness or deformity.   Abdomen:Obese, Soft, No tenderness, No masses, no rebound, no guarding.   Back: No CVA tenderness.   Musculoskeletal: No major deformities noted, No calf tenderness or palpable chords  Skin: Warm, Dry, No erythema, No rash.   Neurologic: Alert & oriented x 3, Normal motor function,  No focal deficits noted.   Psychiatric: Affect normal, Judgment normal, Mood normal.     LABS  Results for orders placed or performed during the hospital encounter of 04/24/17   Lactic acid (lactate)   Result Value Ref Range    Lactic Acid 1.9 0.5 - 2.0 mmol/L   Lactic acid (lactate)   Result Value Ref Range    Lactic Acid 1.7 0.5 - 2.0 mmol/L   CBC WITH DIFFERENTIAL   Result Value " Ref Range    WBC 12.1 (H) 4.8 - 10.8 K/uL    RBC 2.83 (L) 4.20 - 5.40 M/uL    Hemoglobin 8.0 (L) 12.0 - 16.0 g/dL    Hematocrit 26.2 (L) 37.0 - 47.0 %    MCV 92.6 81.4 - 97.8 fL    MCH 28.3 27.0 - 33.0 pg    MCHC 30.5 (L) 33.6 - 35.0 g/dL    RDW 63.1 (H) 35.9 - 50.0 fL    Platelet Count 216 164 - 446 K/uL    MPV 9.4 9.0 - 12.9 fL    Nucleated RBC 0.00 /100 WBC    NRBC (Absolute) 0.00 K/uL    Neutrophils-Polys 83.30 (H) 44.00 - 72.00 %    Lymphocytes 7.90 (L) 22.00 - 41.00 %    Monocytes 1.80 0.00 - 13.40 %    Eosinophils 0.90 0.00 - 6.90 %    Basophils 1.70 0.00 - 1.80 %    Neutrophils (Absolute) 10.39 (H) 2.00 - 7.15 K/uL    Lymphs (Absolute) 0.96 (L) 1.00 - 4.80 K/uL    Monos (Absolute) 0.22 0.00 - 0.85 K/uL    Eos (Absolute) 0.11 0.00 - 0.51 K/uL    Baso (Absolute) 0.21 (H) 0.00 - 0.12 K/uL    Anisocytosis 1+     Microcytosis 1+    COMP METABOLIC PANEL   Result Value Ref Range    Sodium 136 135 - 145 mmol/L    Potassium 5.8 (H) 3.6 - 5.5 mmol/L    Chloride 104 96 - 112 mmol/L    Co2 24 20 - 33 mmol/L    Anion Gap 8.0 0.0 - 11.9    Glucose 205 (H) 65 - 99 mg/dL    Bun 40 (H) 8 - 22 mg/dL    Creatinine 1.96 (H) 0.50 - 1.40 mg/dL    Calcium 8.9 8.5 - 10.5 mg/dL    AST(SGOT) 20 12 - 45 U/L    ALT(SGPT) 18 2 - 50 U/L    Alkaline Phosphatase 66 30 - 99 U/L    Total Bilirubin 0.7 0.1 - 1.5 mg/dL    Albumin 3.0 (L) 3.2 - 4.9 g/dL    Total Protein 5.7 (L) 6.0 - 8.2 g/dL    Globulin 2.7 1.9 - 3.5 g/dL    A-G Ratio 1.1 g/dL   URINALYSIS   Result Value Ref Range    Micro Urine Req Microscopic     Color Yellow     Character Sl Cloudy (A)     Specific Gravity 1.013 <1.035    Ph 5.5 5.0-8.0    Glucose Negative Negative mg/dL    Ketones Negative Negative mg/dL    Protein 100 (A) Negative mg/dL    Bilirubin Negative Negative    Nitrite Negative Negative    Leukocyte Esterase Negative Negative    Occult Blood Negative Negative   CKMB   Result Value Ref Range    CK-Mb 5.8 (H) 0.0 - 5.0 ng/mL   TROPONIN   Result Value Ref Range     Troponin I 0.09 (H) 0.00 - 0.04 ng/mL   BTYPE NATRIURETIC PEPTIDE   Result Value Ref Range    B Natriuretic Peptide 308 (H) 0 - 100 pg/mL   ARTERIAL BLOOD GAS w/ O2 (LAB)   Result Value Ref Range    Ph 7.37 (L) 7.40 - 7.50    Pco2 41.6 (H) 26.0 - 37.0 mmHg    Po2 130.9 (H) 64.0 - 87.0 mmHg    O2 Saturation 98.0 93.0 - 99.0 %    Hco3 23 17 - 25 mmol/L    Base Excess -2 -4 - 3 mmol/L    Body Temp see below Centigrade   DIFFERENTIAL MANUAL   Result Value Ref Range    Bands-Stabs 2.60 0.00 - 10.00 %    Myelocytes 1.80 %    Manual Diff Status PERFORMED    PERIPHERAL SMEAR REVIEW   Result Value Ref Range    Peripheral Smear Review see below    MORPHOLOGY   Result Value Ref Range    RBC Morphology Present     Polychromia 1+     Poikilocytosis 1+     Basophilic Stippling Few    ESTIMATED GFR   Result Value Ref Range    GFR If  31 (A) >60 mL/min/1.73 m 2    GFR If Non African American 25 (A) >60 mL/min/1.73 m 2   URINE MICROSCOPIC (W/UA)   Result Value Ref Range    Amorphous Crystal Present /hpf    Hyaline Cast 0-2 /lpf   PT/INR   Result Value Ref Range    PT 14.3 12.0 - 14.6 sec    INR 1.08 0.87 - 1.13   EKG (NOW)   Result Value Ref Range    Report       [facility] Emergency Dept.    Test Date:  2017  Pt Name:    CHAITANYA CABELLO            Department: ER  MRN:        [id number]                      Room:        04  Gender:     F                            Technician: [id number]  :        1950                   Requested By:ER TRIAGE PROTOCOL  Order #:    [id number]                    Reading MD:    Measurements  Intervals                                Axis  Rate:       68                           P:          25  HI:         168                          QRS:        23  QRSD:       82                           T:          105  QT:         388  QTc:        413    Interpretive Statements  SINUS RHYTHM  ABNORMAL T, CONSIDER ISCHEMIA, LATERAL LEADS  Compared to ECG 2017  17:13:25  T-wave abnormality now present  Possible ischemia now present     EKG (NOW)   Result Value Ref Range    Report       [facility] Emergency Dept.    Test Date:  2017  Pt Name:    CHAITANYA CABELLO            Department: ER  MRN:        [id number]                      Room:       [facility]  Gender:     F                            Technician: [id number]  :        1950                   Requested By:JALEN ADAMES  Order #:    [id number]                    Reading MD:    Measurements  Intervals                                Axis  Rate:       91                           P:  [place]:                                      QRS:        32  QRSD:       98                           T:          136  QT:         364  QTc:        448    Interpretive Statements  ATRIAL FIBRILLATION, V-RATE  89- 93  PAIRED VENTRICULAR PREMATURE COMPLEXES  MULT INTERPOLATED VENT PREMATURE COMPLEXES  ABNORMAL T, CONSIDER ISCHEMIA, LATERAL LEADS  ARTIFACT IN LEAD(S) II,III,V1,V2,V3,V4,V5,V6  Compared to ECG 2017 21:34:21  Ventricular premature complex(es) now present  Sinus rhythm no longer present  T-wave  abnormality still present  Possible ischemia still present       All labs reviewed by me.    EKG  12 Lead EKG interpreted by me shows a normal sinus rhythm at a rate of 68. Axis normal. No ST elevations. Slightly peaked T waves in V2 and V3. T wave inversion in 1 and AVL.  Compared to 10/29/13 shows no acute changes. Final impression: Normal sinus with slightly peaked T waves and T wave inversions in 1 and AVL. Final Impression: Sinus rhythm with T wave inversions in lateral leads.     EKG 2  12 Lead EKG completed at 11:52 PM and interpreted by me shows a normal sinus rhythm at a rate of 91. Continued T wave inversion in 6, 1 and AVL.  Axis normal. No ST elevations. No T wave inversions.  Old EKG from earlier shows no significant changes. Final Impression: Sinus with T wave inversions in lateral leads.      RADIOLOGY  DX-CHEST-PORTABLE (1 VIEW)   Final Result      1.  Interval placement of a right IJ central line without evidence of complication.      2.  Again seen diffuse bilateral pulmonary infiltrates, right greater than left.      DX-CHEST-PORTABLE (1 VIEW)   Final Result      1.  Worsening bilateral infiltrates, right greater than left.      2.  Cardiomegaly.        The radiologist's interpretation of all radiological studies have been reviewed by me.    Central Line Placement Procedure Note  Indication: vascular access, poor peripheral access, centrally administered medications and need for frequent blood draws    Consent: The patient provided verbal consent for this procedure.    Procedure: The patient was positioned appropriately and the skin over the right internal jugular vein was prepped with chlorhexidine. Local anesthesia was obtained by infiltration using 1% Lidocaine without epinephrine. Using ultrasound guidance A large bore needle was used to identify the vein.  A guide wire was then inserted into the vein through the needle. Next ultrasound was used to verify that this was in the internal jugular vein.  A triple lumen catheter was then inserted into the vessel over the guide wire using the Seldinger technique.  All ports showed good, free flowing blood return and were flushed with saline solution.  The catheter was then securely fastened to the skin with sutures and covered with a sterile dressing.  A post procedure X-ray was ordered and showed good line position.    The patient tolerated the procedure well.    Complications: None    COURSE & MEDICAL DECISION MAKING  Pertinent Labs & Imaging studies reviewed. (See chart for details)    9:50 PM - Patient seen and examined at bedside. Ordered DX-Chest, Lactic Acid, Arterial Blood Gas, CBC, CMP, Urinalysis, Urine Culture, Blood Culture, CKMB, Troponin, BNP, and an EKG to evaluate her symptoms.      9:55 PM- The patient's chest x ray was taken. I  reviewed the results, which appeared consistent with right lower lobe pneumonia. I spoke to pharmacy at this time to discuss the patient's allergies, condition, and plan of care. The patient will be treated with 2,700 mg of Vancomycin in  ml, 3.375 g of zosyn in  IVPB, 5 mg of Nebulizer Soltuion Proventil, and a 1,000 ml NS Infusion.      10:20 MP- Acutely called into the patient's room. The patient was informed of the indications and risks associated with the procedure. She gave verbal consent for the procedure. With the help of her attending nurses, I performed the central line placement procedure. The patient tolerated this well and further details can be seen above.     10:44 PM- Patient's central line procedure completed at this time. Paged imaging at this time to have repeat chest x ray completed.     10:55 PM- The patient's chest x ray shows proper alignment.     10:55 PM- The patient will be treated with 8 mg of Levophed in  ml and 1 mg of IV Norepinephrine Bitartrate.     11:22 PM- The patient will be treated with 10 units of Humulin, 50 ml of IV Dextrose, and 1,000 mg of Gluconate in D5W 100 ml IVPB.    11:54 PM- The patient presents with persistent hypotension.  The patient will be treated with 20 units of Vasostrict in  ml Infusion and a 3,267 ml NS Bolus Infusion.     12:04 AM- I obtained the patient's repeat EKG at this time. Findings can be seen above.     12:05 AM - I discussed the patient's case and the above findings with Dr. Menjivar (hospitalist) who will see and admit the patient. Care is transferred at this time.      CRITICAL CARE  The very real possibilty of a deterioration of this patient's condition required the highest level of my preparedness for sudden, emergent intervention.  I provided critical care services, which included medication orders, frequent reevaluations of the patient's condition and response to treatment, ordering and reviewing test results, and  discussing the case with various consultants.  The critical care time associated with the care of the patient was 35 minutes. Review chart for interventions. This time is exclusive of any other billable procedures.     Medical Decision Making:     DISPOSITION:  Patient will be admitted to Dr. Menjivar in guarded condition.    FINAL IMPRESSION  1. Pneumonia of right lower lobe due to infectious organism    2. Hypotension, unspecified hypotension type    3. Hypoxia    4. Severe sepsis (CMS-HCC)    5. Hyperkalemia     Critical Care Time of 35 Minutes     Central Line Procedure Completed.      Trisha CLAY (Scribe), am scribing for, and in the presence of, Tree Caballero M.D.    Electronically signed by: Trisha Porter (Miguelibe), 4/24/2017    Tree CLAY M.D. personally performed the services described in this documentation, as scribed by Trisha Porter in my presence, and it is both accurate and complete.    The note accurately reflects work and decisions made by me.  Tree Caballero  4/25/2017  3:21 AM

## 2017-04-25 NOTE — ED NOTES
Patient's MAP consistently < 65 despite continuous infusions of Vasopressin and Norepinepherine. Spoke with Dr. Menjivar - states that is OK for as long as SBP >100. No new orders at this time.

## 2017-04-25 NOTE — ED NOTES
Patient's BP improving however MAP still low. Discussed with Dr. Caballero. Vasopressin initiated per orders.

## 2017-04-25 NOTE — H&P
CHIEF COMPLAINT:  Shortness of breath.    PRIMARY CARE PROVIDER:  Mickie Lawson MD    HISTORY OF PRESENT ILLNESS:  This is a 66-year-old woman whose neighbors   called welfare check with the  today because they had not seen her.    Apparently, the paramedics arrived and found her lethargic and her oxygen   disconnected.  She was pale and cyanotic and her saturation was at 74%.  She   is normally on 3.5 L of supplemental oxygen chronically.  Apparently, she says   she has been coughing and having worsening congestion, and chest discomfort.    She has had sepsis in the past.  This has been secondary to pneumonia.  She   has not had any fever or chills.  She has been short of breath and producing   yellow sputum.  She has not been having any nausea or vomiting, no abdominal   pain or diarrhea.  She does not know of any ill contacts.  On arrival to the   emergency department, she had a systolic pressure of 70.    REVIEW OF SYSTEMS:  A complete review of systems was performed other than what   is stated in the history present illness is otherwise negative.    PAST MEDICAL HISTORY:  COPD, hypertension, hepatitis C, fibromyalgia,   peripheral neuropathy, type 2 diabetes, pulmonary hypertension, morbid   obesity, GERD, sleep apnea, hypothyroidism, chronic back pain, and chronic   respiratory failure.    PAST SURGICAL HISTORY:  Hysterectomy, left knee replacement, carpal tunnel   surgery, left breast lumpectomy, and multiple back injections.    FAMILY HISTORY:  Positive for lung disease and alcohol abuse in her mother and   diabetes, stroke and heart disease.    SOCIAL HISTORY:  She is a former smoker.  She has a 50-pack-year history of   smoking, but only quit recently in February of this year, that is the last   cigarette she says she had.  She does not drink alcohol and has no history of   illicit drug abuse.  She is  and lives alone in the community.    ALLERGIES:  TO KEFLEX, but she does tolerate  Augmentin and Zosyn.  SHE GETS   STOMACH PAIN WITH CODEINE, LYRICA MAKES HER DIZZY AND SHE GETS CHEST PAIN WITH   SUDAFED.    CURRENT MEDICATIONS:  Albuterol nebulize every 4 hours as needed for shortness   of breath or wheezing, Combivent 2 puffs every 6 hours as needed for   shortness of breath, amlodipine 10 mg daily, ascorbic acid 500 mg 3 times   daily with meals, carvedilol 3.125 mg b.i.d., Flexeril 10 mg 3 times daily as   needed for pain, duloxetine 60 mg daily, Lexapro 10 mg daily, ferrous   gluconate 324 mg twice daily, Advair 500/50 one puff b.i.d., gabapentin 600 mg   3 times daily, hydrochlorothiazide 25 mg daily, glargine insulin 20 units   every evening, Combivent 1 puff 4 times daily, lactulose 30 mL twice daily as   needed for constipation, levothyroxine 75 mcg daily, lisinopril 40 mg daily,   montelukast 10 mg daily, multivitamin once daily, Prilosec 20 mg daily,   oxycodone 10 mg to 20 mg every 6 hours as needed for pain, potassium chloride   20 mEq daily, simvastatin 10 mg daily, tiotropium 18 mcg inhaled daily, and   trazodone 300 mg at bedtime as needed for sleep.    PHYSICAL EXAMINATION:  VITAL SIGNS:  Temperature is 100.8 degrees, heart rate 92, respirations 18,   and pulse oximetry 90% on 8 liters nonrebreather mask.  Current blood pressure   is 93/27.  GENERAL:  This is an obese woman.  She is awake and alert, oriented x3.  She   appears acutely ill.  She is calm and cooperative with the examination.  HEENT:  Normocephalic, atraumatic.  Pupils are equal and reactive.  Mucous   membranes are pale.  Sclerae are nonicteric.  Oropharynx is clear.  The mucous   membranes are dry.  Neck is supple without lymphadenopathy.  The neck is   obese and I cannot appreciate any jugular venous distention.  The trachea is   midline without stridor.  CHEST:  She has bilateral crackles and few rhonchi as well.  She is slightly   tachypneic, but no accessory muscle use and no intercostal retractions,    although this would be difficult to see due to her body habitus.  No chest   wall tenderness is present.  CARDIOVASCULAR:  Distant heart tones, regular rate.  No murmur, rub or gallop   is appreciated.  Radial pulses are 1+ symmetric and her capillary refill is   mildly delayed.  ABDOMEN:  Obese, but soft, not tender, not distended.  I cannot appreciate any   bowel sounds due to her body habitus.  No rebound or guarding is present.  EXTREMITIES:  No cyanosis, clubbing or edema is present in the extremities.    No fracture or bony abnormality is noted.  SKIN:  She has multiple ecchymoses present over the lower legs and both arms,   none on the face or forehead and none on her shoulders that I can tell.  NEUROLOGIC:  She is awake and alert, oriented x3, moving all extremities and   her cranial nerves are intact.    LABORATORY DATA:  PH is 7.37, pCO2 of 41 and pO2 is 130.  White blood cell   count is 12.1, hemoglobin is 8.0, hematocrit is 26.2, and platelets are 216.    Sodium 136, potassium 5.8, chloride 104, bicarbonate 24, glucose is 205, BUN   is 40, creatinine is 1.96, calcium 8.9, AST is 20, ALT is 18, alkaline   phosphatase is 66, total bilirubin is 0.7, albumin is 3.0, total protein 5.7,   troponin 0.09.  BNP is 308.  INR 1.08.  Urinalysis is negative for evidence of   infection.  Chest x-ray shows bilateral pulmonary infiltrates.  It is   predominant on the right and there is no pleural effusion visible.  There is   an enlarged cardiac silhouette.    IMPRESSION:  1.  Severe sepsis:  This is likely secondary to pneumonia, sepsis protocol was   ordered.  She has been started on Levophed drip and her pressures are coming   up.  She is a _____ systolic at the time of this dictation.  We started Zosyn   and vancomycin for her severe sepsis.  Respiratory treatments and protocol   have been ordered, supplemental oxygen, and she will be admitted to the   intensive care unit.  A culture of some of her respiratory  secretions was sent   in the emergency department.  2.  Pneumonia, atypical bilateral:  She has got covered for this with the   Zosyn and we will cover for the outside chance of MRSA pneumonia with the   vancomycin.  Again, respiratory treatments and culture have been ordered as   well as the sepsis protocol.  3.  Chronic obstructive pulmonary disease:  Continue her usual inhalers   treating pneumonia as above.  She is not in chronic obstructive pulmonary   disease exacerbation.  Thus, I will not give her any steroids at this time.  4.  Acute on chronic renal insufficiency:  The patient's baseline appears to   be around 1.4.  She is being given fluids on the sepsis protocol and I am   going to run normal saline at a rate of 100 mL per hour.  Unfortunately, she   has a history of heart failure, but at this point is intravascularly depleted.  5.  Hyperkalemia:  Secondary to renal failure and exogenous use of potassium.    She has been given insulin and glucose and administered Kayexalate.  We will   repeat a BMP.  This should come down with fluids and holding her supplemental   potassium.  6.  Protein calorie malnutrition, hypoalbuminemia:  A dietary consultation,   diabetic diet.  7.  Elevated troponin 0.09, likely secondary to sepsis.  We will follow repeat   troponin every 6 hours for 2 measurements and this is probably secondary to   her sepsis and not an acute coronary syndrome.  8.  Chronic pain, continue gabapentin and Cymbalta.  9.  Hypothyroidism.  Continue levothyroxine.  10.  History of hypertension:  Discontinue all antihypertensive medications as   she is hypotensive currently with her sepsis.    This is a critically ill patient going to the intensive care unit on Levophed   drip.  Total critical care time is 40 minutes.       ____________________________________     MD BACILIO DENTON / DMITRY    DD:  04/25/2017 01:55:51  DT:  04/25/2017 03:24:40    D#:  [id number]  Job#:  [id number]

## 2017-04-25 NOTE — PROGRESS NOTES
Pt reports she is missing her glasses, last worn in the ER.  Called ER and no sign of purple glasses with clear rims.  Will continue to search.

## 2017-04-25 NOTE — ED NOTES
Spoke with ICU regarding bed assignment. State that bed is not clean at this time. Will check back.

## 2017-04-25 NOTE — PROGRESS NOTES
Pt became very dyspneic after use of bedpan, abdominal breathing.  Oxygen increased to 15L mask SAT 92%, RT to bedside for treatment.     1430 After treatment pt returned to 7L oxymask satting 93%-96% at rest.

## 2017-04-26 PROBLEM — J18.9 CAP (COMMUNITY ACQUIRED PNEUMONIA): Status: ACTIVE | Noted: 2017-04-26

## 2017-04-26 LAB
ABO GROUP BLD: NORMAL
ALBUMIN SERPL BCP-MCNC: 2.6 G/DL (ref 3.2–4.9)
ALBUMIN/GLOB SERPL: 1.1 G/DL
ALP SERPL-CCNC: 55 U/L (ref 30–99)
ALT SERPL-CCNC: 19 U/L (ref 2–50)
ANION GAP SERPL CALC-SCNC: 4 MMOL/L (ref 0–11.9)
ANISOCYTOSIS BLD QL SMEAR: ABNORMAL
AST SERPL-CCNC: 13 U/L (ref 12–45)
BACTERIA UR CULT: NORMAL
BARCODED ABORH UBTYP: 5100
BARCODED PRD CODE UBPRD: NORMAL
BARCODED UNIT NUM UBUNT: NORMAL
BASO STIPL BLD QL SMEAR: NORMAL
BASOPHILS # BLD AUTO: 0 % (ref 0–1.8)
BASOPHILS # BLD: 0 K/UL (ref 0–0.12)
BILIRUB SERPL-MCNC: 0.6 MG/DL (ref 0.1–1.5)
BLD GP AB SCN SERPL QL: NORMAL
BUN SERPL-MCNC: 32 MG/DL (ref 8–22)
CALCIUM SERPL-MCNC: 8.5 MG/DL (ref 8.5–10.5)
CHLORIDE SERPL-SCNC: 109 MMOL/L (ref 96–112)
CO2 SERPL-SCNC: 25 MMOL/L (ref 20–33)
COMPONENT R 8504R: NORMAL
CREAT SERPL-MCNC: 1.43 MG/DL (ref 0.5–1.4)
EOSINOPHIL # BLD AUTO: 0 K/UL (ref 0–0.51)
EOSINOPHIL NFR BLD: 0 % (ref 0–6.9)
ERYTHROCYTE [DISTWIDTH] IN BLOOD BY AUTOMATED COUNT: 62.4 FL (ref 35.9–50)
GFR SERPL CREATININE-BSD FRML MDRD: 37 ML/MIN/1.73 M 2
GLOBULIN SER CALC-MCNC: 2.4 G/DL (ref 1.9–3.5)
GLUCOSE BLD-MCNC: 178 MG/DL (ref 65–99)
GLUCOSE BLD-MCNC: 202 MG/DL (ref 65–99)
GLUCOSE BLD-MCNC: 315 MG/DL (ref 65–99)
GLUCOSE BLD-MCNC: 331 MG/DL (ref 65–99)
GLUCOSE SERPL-MCNC: 202 MG/DL (ref 65–99)
HCT VFR BLD AUTO: 20.5 % (ref 37–47)
HGB BLD-MCNC: 6.3 G/DL (ref 12–16)
HGB RETIC QN AUTO: 23.7 PG/CELL (ref 29–35)
IMM RETICS NFR: 34.9 % (ref 9.3–17.4)
IRON SATN MFR SERPL: 8 % (ref 15–55)
IRON SERPL-MCNC: 20 UG/DL (ref 40–170)
LYMPHOCYTES # BLD AUTO: 0.5 K/UL (ref 1–4.8)
LYMPHOCYTES NFR BLD: 5.3 % (ref 22–41)
MANUAL DIFF BLD: NORMAL
MCH RBC QN AUTO: 28.4 PG (ref 27–33)
MCHC RBC AUTO-ENTMCNC: 30.7 G/DL (ref 33.6–35)
MCV RBC AUTO: 92.3 FL (ref 81.4–97.8)
METAMYELOCYTES NFR BLD MANUAL: 1.8 %
MICROCYTES BLD QL SMEAR: ABNORMAL
MONOCYTES # BLD AUTO: 0.33 K/UL (ref 0–0.85)
MONOCYTES NFR BLD AUTO: 3.5 % (ref 0–13.4)
MORPHOLOGY BLD-IMP: NORMAL
NEUTROPHILS # BLD AUTO: 8.4 K/UL (ref 2–7.15)
NEUTROPHILS NFR BLD: 89.4 % (ref 44–72)
NRBC # BLD AUTO: 0.05 K/UL
NRBC BLD AUTO-RTO: 0.5 /100 WBC
OVALOCYTES BLD QL SMEAR: NORMAL
PLATELET # BLD AUTO: 129 K/UL (ref 164–446)
PLATELET BLD QL SMEAR: NORMAL
PMV BLD AUTO: 10.1 FL (ref 9–12.9)
POIKILOCYTOSIS BLD QL SMEAR: NORMAL
POLYCHROMASIA BLD QL SMEAR: NORMAL
POTASSIUM SERPL-SCNC: 4.7 MMOL/L (ref 3.6–5.5)
PRODUCT TYPE UPROD: NORMAL
PROT SERPL-MCNC: 5 G/DL (ref 6–8.2)
RBC # BLD AUTO: 2.22 M/UL (ref 4.2–5.4)
RBC BLD AUTO: PRESENT
RETICS # AUTO: 0.06 M/UL (ref 0.04–0.06)
RETICS/RBC NFR: 2.5 % (ref 0.8–2.1)
RH BLD: NORMAL
SIGNIFICANT IND 70042: NORMAL
SITE SITE: NORMAL
SODIUM SERPL-SCNC: 138 MMOL/L (ref 135–145)
SOURCE SOURCE: NORMAL
TIBC SERPL-MCNC: 245 UG/DL (ref 250–450)
UNIT STATUS USTAT: NORMAL
WBC # BLD AUTO: 9.4 K/UL (ref 4.8–10.8)

## 2017-04-26 PROCEDURE — 700111 HCHG RX REV CODE 636 W/ 250 OVERRIDE (IP): Performed by: HOSPITALIST

## 2017-04-26 PROCEDURE — 82962 GLUCOSE BLOOD TEST: CPT | Mod: 91

## 2017-04-26 PROCEDURE — 86900 BLOOD TYPING SEROLOGIC ABO: CPT

## 2017-04-26 PROCEDURE — 83550 IRON BINDING TEST: CPT

## 2017-04-26 PROCEDURE — 80053 COMPREHEN METABOLIC PANEL: CPT

## 2017-04-26 PROCEDURE — 83540 ASSAY OF IRON: CPT

## 2017-04-26 PROCEDURE — A9270 NON-COVERED ITEM OR SERVICE: HCPCS | Performed by: HOSPITALIST

## 2017-04-26 PROCEDURE — 85027 COMPLETE CBC AUTOMATED: CPT

## 2017-04-26 PROCEDURE — 700102 HCHG RX REV CODE 250 W/ 637 OVERRIDE(OP): Performed by: HOSPITALIST

## 2017-04-26 PROCEDURE — 85046 RETICYTE/HGB CONCENTRATE: CPT

## 2017-04-26 PROCEDURE — 94668 MNPJ CHEST WALL SBSQ: CPT

## 2017-04-26 PROCEDURE — 30233N1 TRANSFUSION OF NONAUTOLOGOUS RED BLOOD CELLS INTO PERIPHERAL VEIN, PERCUTANEOUS APPROACH: ICD-10-PCS | Performed by: HOSPITALIST

## 2017-04-26 PROCEDURE — 86901 BLOOD TYPING SEROLOGIC RH(D): CPT

## 2017-04-26 PROCEDURE — 99233 SBSQ HOSP IP/OBS HIGH 50: CPT | Performed by: HOSPITALIST

## 2017-04-26 PROCEDURE — 85007 BL SMEAR W/DIFF WBC COUNT: CPT

## 2017-04-26 PROCEDURE — 700102 HCHG RX REV CODE 250 W/ 637 OVERRIDE(OP): Performed by: NURSE PRACTITIONER

## 2017-04-26 PROCEDURE — 94667 MNPJ CHEST WALL 1ST: CPT

## 2017-04-26 PROCEDURE — 94760 N-INVAS EAR/PLS OXIMETRY 1: CPT

## 2017-04-26 PROCEDURE — 770006 HCHG ROOM/CARE - MED/SURG/GYN SEMI*

## 2017-04-26 PROCEDURE — 700105 HCHG RX REV CODE 258: Performed by: HOSPITALIST

## 2017-04-26 PROCEDURE — A9270 NON-COVERED ITEM OR SERVICE: HCPCS | Performed by: NURSE PRACTITIONER

## 2017-04-26 PROCEDURE — 700105 HCHG RX REV CODE 258

## 2017-04-26 PROCEDURE — 700101 HCHG RX REV CODE 250: Performed by: INTERNAL MEDICINE

## 2017-04-26 PROCEDURE — 36430 TRANSFUSION BLD/BLD COMPNT: CPT

## 2017-04-26 PROCEDURE — P9016 RBC LEUKOCYTES REDUCED: HCPCS

## 2017-04-26 PROCEDURE — 94640 AIRWAY INHALATION TREATMENT: CPT

## 2017-04-26 PROCEDURE — 86850 RBC ANTIBODY SCREEN: CPT

## 2017-04-26 PROCEDURE — 86923 COMPATIBILITY TEST ELECTRIC: CPT

## 2017-04-26 RX ORDER — SODIUM CHLORIDE 9 MG/ML
INJECTION, SOLUTION INTRAVENOUS
Status: COMPLETED
Start: 2017-04-26 | End: 2017-04-26

## 2017-04-26 RX ORDER — CYCLOBENZAPRINE HCL 10 MG
10 TABLET ORAL 3 TIMES DAILY PRN
Status: DISCONTINUED | OUTPATIENT
Start: 2017-04-26 | End: 2017-05-04 | Stop reason: HOSPADM

## 2017-04-26 RX ADMIN — IPRATROPIUM BROMIDE AND ALBUTEROL SULFATE 3 ML: .5; 3 SOLUTION RESPIRATORY (INHALATION) at 10:29

## 2017-04-26 RX ADMIN — THERA TABS 1 TABLET: TAB at 08:10

## 2017-04-26 RX ADMIN — SODIUM CHLORIDE 500 ML: 9 INJECTION, SOLUTION INTRAVENOUS at 12:30

## 2017-04-26 RX ADMIN — OXYCODONE HYDROCHLORIDE 10 MG: 10 TABLET ORAL at 22:08

## 2017-04-26 RX ADMIN — HYDROCORTISONE SODIUM SUCCINATE 50 MG: 100 INJECTION, POWDER, FOR SOLUTION INTRAMUSCULAR; INTRAVENOUS at 15:38

## 2017-04-26 RX ADMIN — PIPERACILLIN SODIUM AND TAZOBACTAM SODIUM 3.38 G: 3; .375 INJECTION, POWDER, FOR SOLUTION INTRAVENOUS at 01:14

## 2017-04-26 RX ADMIN — TIOTROPIUM BROMIDE 1 CAPSULE: 18 CAPSULE ORAL; RESPIRATORY (INHALATION) at 07:37

## 2017-04-26 RX ADMIN — ENOXAPARIN SODIUM 40 MG: 100 INJECTION SUBCUTANEOUS at 08:11

## 2017-04-26 RX ADMIN — INSULIN LISPRO 4 UNITS: 100 INJECTION, SOLUTION INTRAVENOUS; SUBCUTANEOUS at 21:10

## 2017-04-26 RX ADMIN — MONTELUKAST SODIUM 10 MG: 10 TABLET, FILM COATED ORAL at 08:09

## 2017-04-26 RX ADMIN — GABAPENTIN 600 MG: 300 CAPSULE ORAL at 15:38

## 2017-04-26 RX ADMIN — GABAPENTIN 600 MG: 300 CAPSULE ORAL at 08:09

## 2017-04-26 RX ADMIN — INSULIN LISPRO 10 UNITS: 100 INJECTION, SOLUTION INTRAVENOUS; SUBCUTANEOUS at 17:18

## 2017-04-26 RX ADMIN — DULOXETINE HYDROCHLORIDE 60 MG: 60 CAPSULE, DELAYED RELEASE ORAL at 08:09

## 2017-04-26 RX ADMIN — HYDROCORTISONE SODIUM SUCCINATE 50 MG: 100 INJECTION, POWDER, FOR SOLUTION INTRAMUSCULAR; INTRAVENOUS at 21:01

## 2017-04-26 RX ADMIN — INSULIN HUMAN 10 UNITS: 100 INJECTION, SUSPENSION SUBCUTANEOUS at 06:27

## 2017-04-26 RX ADMIN — OXYCODONE HYDROCHLORIDE 10 MG: 10 TABLET ORAL at 15:38

## 2017-04-26 RX ADMIN — FAMOTIDINE 20 MG: 20 TABLET, FILM COATED ORAL at 08:09

## 2017-04-26 RX ADMIN — CYCLOBENZAPRINE HYDROCHLORIDE 10 MG: 10 TABLET, FILM COATED ORAL at 21:00

## 2017-04-26 RX ADMIN — BUDESONIDE AND FORMOTEROL FUMARATE DIHYDRATE 2 PUFF: 160; 4.5 AEROSOL RESPIRATORY (INHALATION) at 20:24

## 2017-04-26 RX ADMIN — TRAZODONE HYDROCHLORIDE 75 MG: 50 TABLET ORAL at 21:00

## 2017-04-26 RX ADMIN — PIPERACILLIN SODIUM AND TAZOBACTAM SODIUM 3.38 G: 3; .375 INJECTION, POWDER, FOR SOLUTION INTRAVENOUS at 17:21

## 2017-04-26 RX ADMIN — LEVOTHYROXINE SODIUM 75 MCG: 75 TABLET ORAL at 06:30

## 2017-04-26 RX ADMIN — INSULIN LISPRO 3 UNITS: 100 INJECTION, SOLUTION INTRAVENOUS; SUBCUTANEOUS at 06:26

## 2017-04-26 RX ADMIN — SIMVASTATIN 10 MG: 20 TABLET, FILM COATED ORAL at 21:00

## 2017-04-26 RX ADMIN — BUDESONIDE AND FORMOTEROL FUMARATE DIHYDRATE 2 PUFF: 160; 4.5 AEROSOL RESPIRATORY (INHALATION) at 07:38

## 2017-04-26 RX ADMIN — IPRATROPIUM BROMIDE AND ALBUTEROL SULFATE 3 ML: .5; 3 SOLUTION RESPIRATORY (INHALATION) at 20:23

## 2017-04-26 RX ADMIN — INSULIN HUMAN 15 UNITS: 100 INJECTION, SUSPENSION SUBCUTANEOUS at 17:19

## 2017-04-26 RX ADMIN — PIPERACILLIN SODIUM AND TAZOBACTAM SODIUM 3.38 G: 3; .375 INJECTION, POWDER, FOR SOLUTION INTRAVENOUS at 11:02

## 2017-04-26 RX ADMIN — HYDROCORTISONE SODIUM SUCCINATE 100 MG: 100 INJECTION, POWDER, FOR SOLUTION INTRAMUSCULAR; INTRAVENOUS at 05:03

## 2017-04-26 RX ADMIN — INSULIN LISPRO 10 UNITS: 100 INJECTION, SOLUTION INTRAVENOUS; SUBCUTANEOUS at 11:05

## 2017-04-26 RX ADMIN — IPRATROPIUM BROMIDE AND ALBUTEROL SULFATE 3 ML: .5; 3 SOLUTION RESPIRATORY (INHALATION) at 15:45

## 2017-04-26 RX ADMIN — LORAZEPAM 0.5 MG: 1 TABLET ORAL at 00:02

## 2017-04-26 RX ADMIN — PIPERACILLIN SODIUM AND TAZOBACTAM SODIUM 3.38 G: 3; .375 INJECTION, POWDER, FOR SOLUTION INTRAVENOUS at 05:03

## 2017-04-26 RX ADMIN — GABAPENTIN 600 MG: 300 CAPSULE ORAL at 21:00

## 2017-04-26 RX ADMIN — IPRATROPIUM BROMIDE AND ALBUTEROL SULFATE 3 ML: .5; 3 SOLUTION RESPIRATORY (INHALATION) at 07:37

## 2017-04-26 ASSESSMENT — PAIN SCALES - GENERAL
PAINLEVEL_OUTOF10: 7
PAINLEVEL_OUTOF10: 0
PAINLEVEL_OUTOF10: 0
PAINLEVEL_OUTOF10: 6
PAINLEVEL_OUTOF10: 6
PAINLEVEL_OUTOF10: 4
PAINLEVEL_OUTOF10: 0
PAINLEVEL_OUTOF10: 0
PAINLEVEL_OUTOF10: 2

## 2017-04-26 ASSESSMENT — ENCOUNTER SYMPTOMS
SHORTNESS OF BREATH: 1
ABDOMINAL PAIN: 0
VOMITING: 0
BACK PAIN: 1
LOSS OF CONSCIOUSNESS: 0
NAUSEA: 0
FEVER: 0
CHILLS: 0
DIZZINESS: 0
DIARRHEA: 0
HEADACHES: 0
COUGH: 1

## 2017-04-26 NOTE — PROGRESS NOTES
"Hospital Medicine Progress Note, Adult, Complex               Author: Jm Trejo Date & Time created: 4/26/2017  10:42 AM     Interval History:  65yo with multiple medical problems (COPD, CKD III, HFPLVEF, DM, HTN) found down at home altered and hypoxic.  Admitted with Dx of HCAP, sepsis     \"I'm doing a lot better today\".  Pt states she is less SOB, her cough has decreased as well.  Able to get up to the bedside this am with no issues.  O2 demand is down as well.    Off pressors other then Hydrocortisone.    Review of Systems:  Review of Systems   Constitutional: Negative for fever and chills.   Respiratory: Positive for cough and shortness of breath.    Cardiovascular: Negative for chest pain.   Gastrointestinal: Negative for nausea, vomiting, abdominal pain and diarrhea.   Musculoskeletal: Positive for back pain.   Skin: Negative for rash.   Neurological: Negative for dizziness, loss of consciousness and headaches.       Physical Exam:  Physical Exam   Constitutional: She is oriented to person, place, and time. She appears well-developed and well-nourished. No distress.   HENT:   Head: Normocephalic and atraumatic.   Neck: No JVD present.   Cardiovascular: Normal rate and regular rhythm.    Murmur heard.  Pulmonary/Chest: Effort normal. No stridor. No respiratory distress. She has no wheezes. She has rales.   Abdominal: Soft. There is no tenderness. There is no rebound and no guarding.   Musculoskeletal: She exhibits edema.   Trace to 1+ edema   Neurological: She is oriented to person, place, and time.   Skin: Skin is warm and dry. No rash noted. She is not diaphoretic.   Psychiatric: She has a normal mood and affect. Thought content normal.   Nursing note and vitals reviewed.      Labs:  Recent Labs      04/24/17   2300   [id number]  7.37*   [id number]  41.6*   [id number]  130.9*   [id number]  98.0   ARTHCO3  23   ARTBE  -2     Recent Labs      04/24/17   2211  04/25/17   0415  04/25/17   0845   CKMB  " 5.8*   --    --    TROPONINI  0.09*  0.30*  0.33*   BNPBTYPENAT  308*   --    --      Recent Labs      17   0510   SODIUM  136  137  138   POTASSIUM  5.8*  4.9  4.7   CHLORIDE  104  110  109   CO2     BUN  40*  32*  32*   CREATININE  1.96*  1.52*  1.43*   CALCIUM  8.9  7.7*  8.5     Recent Labs      175  17   0510   ALTSGPT  18   --   19   ASTSGOT  20   --   13   ALKPHOSPHAT  66   --   55   TBILIRUBIN  0.7   --   0.6   GLUCOSE  205*  234*  202*     Recent Labs      17   0845  17   0510  17   0918   RBC  2.83*   --   2.22*   --    HEMOGLOBIN  8.0*   --   6.3*   --    HEMATOCRIT  26.2*   --   20.5*   --    PLATELETCT  216   --   129*   --    PROTHROMBTM  14.3   --    --    --    INR  1.08   --    --    --    IRON   --   10*   --   20*   TOTIRONBC   --   259   --   245*     Recent Labs      17   0510   WBC  12.1*  9.4   NEUTSPOLYS  83.30*  89.40*   LYMPHOCYTES  7.90*  5.30*   MONOCYTES  1.80  3.50   EOSINOPHILS  0.90  0.00   BASOPHILS  1.70  0.00   ASTSGOT  20  13   ALTSGPT  18  19   ALKPHOSPHAT  66  55   TBILIRUBIN  0.7  0.6           Hemodynamics:  Temp (24hrs), Av °C (98.6 °F), Min:36.5 °C (97.7 °F), Max:37.4 °C (99.4 °F)  Temperature: 36.5 °C (97.7 °F)  Pulse  Av.7  Min: 62  Max: 92Heart Rate (Monitored): 73  NIBP: (!) 164/51 mmHg     Respiratory:    Respiration: 16, Pulse Oximetry: 98 %, O2 Daily Delivery Respiratory : Silicone Nasal Cannula     Given By:: Mouthpiece, #MDI/DPI Given: MDI/DPI x 2, PEP/CPT Method: Positive Airway Pressure Device, Work Of Breathing / Effort: Mild  RUL Breath Sounds: Rhonchi, RML Breath Sounds: Diminished;Rhonchi, RLL Breath Sounds: Diminished, BIRD Breath Sounds: Rhonchi, LLL Breath Sounds: Diminished  Fluids:    Intake/Output Summary (Last 24 hours) at 17 1042  Last data filed at 17 0600   Gross per 24 hour   Intake  2861.95 ml   Output   2520 ml   Net 341.95 ml     Weight: 102.8 kg (226 lb 10.1 oz)  GI/Nutrition:  Orders Placed This Encounter   Procedures   • Diet Order     Standing Status: Standing      Number of Occurrences: 1      Standing Expiration Date:      Order Specific Question:  Diet:     Answer:  Diabetic [3]     Medical Decision Making, by Problem:  Active Hospital Problems    Diagnosis   • Hypertensive heart disease with heart failure (CMS-HCC) [I11.0]  GD II diastolic dysfunction with preserved LVEF  On ACEI, BB as outpt; holding due to sepsis  Follow volume status closely   • HCAP (healthcare-associated pneumonia) [J18.9]  Abx's as noted below   • COPD (chronic obstructive pulmonary disease) (CMS-HCC) [J44.9]  Baseline 3.5 litres at home   • DM type 2, uncontrolled, with renal complications (CMS-HCC) [E11.29, E11.65]  7.9 A1c earlier this year  On lantus 20u and SSI as outpt  Cont SSI  Using NPH in house: increase to 15u BID  Coming down on Hydrocortisone  Follow and titrate   • Iron deficiency anemia [D50.9]  Repeat Fe studies  Give one unit PRBC's  Check Retic count   • HTN (hypertension) [I10]  Not an active issue  On HCTZ 25, amlodipine 10, coreg 3.125, lisinopril 40 as outpt   • Sepsis (CMS-HCC) [A41.9]  Respiratory source  Recent health care exposure vs aspiration  SLP eval  Zosyn  MRSA swab neg so dc'd Vanc  Follow cultures  Off pressors  Start steroid taper   • Elevated troponin [R79.89]  No significant EKG changes  Likely demand ischemia  Pt is ASx'c  Cont to follow   • EDITH (obstructive sleep apnea) [G47.33]   • Chronic kidney disease, stage 3 [N18.3]  HAN resolved with IVF's  Back to her baseline Creat  Cont to follow daily  Avoid Nephrotoxins     DW ICU staff    Medications reviewed, EKG reviewed, Labs reviewed and Radiology images reviewed        DVT Prophylaxis: Heparin  DVT prophylaxis - mechanical: SCDs  Ulcer prophylaxis: Yes  Antibiotics: Treating active infection/contamination beyond 24 hours  perioperative coverage

## 2017-04-26 NOTE — CARE PLAN
Problem: Pain Management  Goal: Pain level will decrease to patient’s comfort goal  Outcome: PROGRESSING AS EXPECTED  PT back pain resolving with oxycodone     Problem: Respiratory:  Goal: Respiratory status will improve  Outcome: PROGRESSING AS EXPECTED  Pt oxygen demand reducing, however still has minimal reserve.

## 2017-04-26 NOTE — CARE PLAN
Problem: Safety  Goal: Will remain free from injury  Intervention: Provide assistance with mobility  Pt steady, stand by assistance for transfer from chair to bed.  Bed alarm active.       Problem: Knowledge Deficit  Goal: Knowledge of disease process/condition, treatment plan, diagnostic tests, and medications will improve  Education provided to pt regaurding plan of care and medications.  Questions answered, pt agrees with plan of care.

## 2017-04-26 NOTE — DOCUMENTATION QUERY
DOCUMENTATION QUERY    PROVIDERS: Please select “Cosign w/ note”to reply to query.    To better represent the severity of illness of your patient, please review the following information and exercise your independent professional judgment in responding to this query.     Protein calorie malnutrition is documented in the History and Physical. Based upon the clinical findings, risk factors, and treatment, can this diagnosis be further specified?    • Mild Protein Calorie Malnutrition  • Moderate Protein Calorie Malnutrition  • Severe Protein Calorie Malnutrition  • Findings of no clinical significance  • Other explanation of clinical findings  • Unable to determine        The medical record reflects the following:   Clinical Findings Documented hypoalbuminemia  Albumin 2.6-3.0   Treatment Labs  Dietary consult  Diabetic diet   Risk Factors Age  Severe Sepsis  Pneumonia  Diabetes  COPD  CKD, stage 3  Chronic respiratory failure  Hypertensive heart and kidney disease  Morbid obesity with BMI >40   Location within medical record History and Physical and Lab Results      Thank you,   Eva Ocampo RN  Clinical   [phone]

## 2017-04-26 NOTE — CARE PLAN
Problem: Oxygenation:  Goal: Maintain adequate oxygenation dependent on patient condition  4 LPM via NC  Home base-line is 3.5 LPM    Problem: Bronchoconstriction:  Goal: Improve in air movement and diminished wheezing  Duoneb QID    Problem: Hyperinflation:  Goal: Prevent or improve atelectasis  PEP therapy QID and IS instruction routinely

## 2017-04-26 NOTE — PROGRESS NOTES
Bedside report received, assumed care of pt.  Boards updated.  Pt assisted back to bed from chair without incident, no signs of distress noted.  Denies pain at this time, all needs met.  Call light with in reach, hourly rounding in place.

## 2017-04-26 NOTE — DISCHARGE PLANNING
Care Transition Team Assessment    IHD met with patient bedside. She stated she lives alone and has previously been receiving HHC through [facility]. She also has O2 at home through [facility], but indicated she has an outstanding balance with them and they won't give her more tanks until she pays. She gets ~$650 a month through Social Security, and does not receive food stamps. Her plan is to have her neighbor drive her home after the hospital.     Information Source  Orientation : Oriented x 4  Information Given By: Patient  Informant's Name: Cinthya  Who is responsible for making decisions for patient? : Patient         Elopement Risk  Legal Hold: No  Ambulatory or Self Mobile in Wheelchair: No-Not an Elopement Risk  Elopement Risk: Not at Risk for Elopement    Interdisciplinary Discharge Planning  Does Admitting Nurse Feel This Could be a Complex Discharge?: No  Primary Care Physician: Dr. Mickie Lawson  Lives with - Patient's Self Care Capacity: Alone and Unable to Care For Self  Patient or legal guardian wants to designate a caregiver (see row info): No  Support Systems: Friends / Neighbors, Children,  /   Housing / Facility: [address]  Do You Take your Prescribed Medications Regularly: Yes  Able to Return to Previous ADL's: Yes  Mobility Issues: Yes  Prior Services: Skilled Home Health Services ([facility])  Patient Expects to be Discharged to:: Home with HHC  Assistance Needed: No  Durable Medical Equipment: Home Oxygen  DME Provider / Phone: [facility]    Discharge Preparedness  What is your plan after discharge?: Home health care  What are your discharge supports?: Other (comment) (Neighbor)  Difficulity with ADLs: None  Difficulity with IADLs: Driving  Difficulity with IADL Comments: Neighbor Ree Drives         Finances  Financial Barriers to Discharge: Yes  Source of Income: Social Security  Prescription Coverage: Yes ([facility])    Vision / Hearing Impairment  Vision Impairment :  Yes         Advance Directive  Advance Directive?: Living Will    Domestic Abuse  Have you ever been the victim of abuse or violence?: No         Discharge Risks or Barriers  Discharge risks or barriers?: Lives alone, no community support  Patient risk factors: Lives alone and no community support    Anticipated Discharge Information  Anticipated discharge disposition: Discharge needs currently unknown  Discharge Address: [address] [address]  Discharge Contact Phone Number: [phone]

## 2017-04-26 NOTE — DOCUMENTATION QUERY
DOCUMENTATION QUERY    PROVIDERS: Please select “Cosign w/ note”to reply to query.    To better represent the severity of illness of your patient, please review the following information and exercise your independent professional judgment in responding to this query.     Hypotension due to sepsis is documented in the History and Physical. Based upon the clinical findings, risk factors, and treatment, can this diagnosis be further specified?    • Septic shock  • Cardiogenic shock  • Hypovolemic shock  • Other type of shock  • Hypotension without shock  • Unable to determine  • Other explanation of clinical findings          The medical record reflects the following:   Clinical Findings Admitting Blood Pressure: 93/27    Treatment Levophed infusion  Vasostrict infusion   Risk Factors Severe sepsis  Pneumonia  Acute renal failure  Hypertensive heart and renal disease - diastolic heart failure  Likely demand ischemia - elevated troponin   Location within medical record History and Physical and Progress Notes     Thank you,   Eva Ocampo RN  Clinical   [phone]

## 2017-04-26 NOTE — DOCUMENTATION QUERY
DOCUMENTATION QUERY    PROVIDERS: Please select “Cosign w/ note”to reply to query.    To better represent the severity of illness of your patient, please review the following information and exercise your independent professional judgment in responding to this query.     Hypertensive heart disease, Grade II Diastolic dysfunction with preserved LVEF is documented in the Progress Notes. Based upon the clinical findings, risk factors, and treatment, can this diagnosis be further specified?    • Acute Systolic heart failure   • Chronic Systolic heart failure  • Acute on Chronic Systolic heart failure  • Acute Diastolic heart failure   • Chronic Diastolic heart failure  • Acute on Chronic Diastolic heart failure  • Acute Systolic and Diastolic heart failure  • Chronic Systolic and Diastolic heart failure  • Acute on Chronic Systolic and diastolic heart failure  • Other explanation of clinical findings  • Unable to determine         The medical record reflects the following:   Clinical Findings ECHO dated 3/16/17:    *Mild concentric left ventricular hypertrophy    *LVEF 60%    *Grade II diastolic dysfunction    Treatment Holding ACEI and beta blocker due to sepsis   Risk Factors Age  History of CHF  Chronic respiratory failure  Diabetes  CKD, stage III  Hypertension - currently hypotensive due to sepsis  Pulmonary hypertension  Morbid obesity with BMI >40  Acute renal failure   Location within medical record Progress Notes, 3/16/17 ECHO     Thank you,   Eva Ocampo RN  Clinical   [phone]

## 2017-04-26 NOTE — PROGRESS NOTES
12 hour chart check    Pt weaned of pressors and to 5L NC, able to ambulate to chair this morning, slept well.     Tele: VA 0.18, QRS 0.08, QT 0.44

## 2017-04-27 LAB
BACTERIA SPEC RESP CULT: ABNORMAL
BACTERIA SPEC RESP CULT: ABNORMAL
ERYTHROCYTE [DISTWIDTH] IN BLOOD BY AUTOMATED COUNT: 57.1 FL (ref 35.9–50)
GLUCOSE BLD-MCNC: 141 MG/DL (ref 65–99)
GLUCOSE BLD-MCNC: 146 MG/DL (ref 65–99)
GLUCOSE BLD-MCNC: 217 MG/DL (ref 65–99)
GLUCOSE BLD-MCNC: 289 MG/DL (ref 65–99)
GRAM STN SPEC: ABNORMAL
HCT VFR BLD AUTO: 24.4 % (ref 37–47)
HGB BLD-MCNC: 7.7 G/DL (ref 12–16)
MCH RBC QN AUTO: 27.9 PG (ref 27–33)
MCHC RBC AUTO-ENTMCNC: 31.6 G/DL (ref 33.6–35)
MCV RBC AUTO: 88.4 FL (ref 81.4–97.8)
PLATELET # BLD AUTO: 167 K/UL (ref 164–446)
PMV BLD AUTO: 9.4 FL (ref 9–12.9)
RBC # BLD AUTO: 2.76 M/UL (ref 4.2–5.4)
SIGNIFICANT IND 70042: ABNORMAL
SITE SITE: ABNORMAL
SOURCE SOURCE: ABNORMAL
WBC # BLD AUTO: 10.1 K/UL (ref 4.8–10.8)

## 2017-04-27 PROCEDURE — 700102 HCHG RX REV CODE 250 W/ 637 OVERRIDE(OP): Performed by: INTERNAL MEDICINE

## 2017-04-27 PROCEDURE — 700111 HCHG RX REV CODE 636 W/ 250 OVERRIDE (IP): Performed by: HOSPITALIST

## 2017-04-27 PROCEDURE — A9270 NON-COVERED ITEM OR SERVICE: HCPCS | Performed by: HOSPITALIST

## 2017-04-27 PROCEDURE — 99232 SBSQ HOSP IP/OBS MODERATE 35: CPT | Performed by: INTERNAL MEDICINE

## 2017-04-27 PROCEDURE — 85027 COMPLETE CBC AUTOMATED: CPT

## 2017-04-27 PROCEDURE — 700105 HCHG RX REV CODE 258: Performed by: HOSPITALIST

## 2017-04-27 PROCEDURE — 94760 N-INVAS EAR/PLS OXIMETRY 1: CPT

## 2017-04-27 PROCEDURE — A9270 NON-COVERED ITEM OR SERVICE: HCPCS | Performed by: INTERNAL MEDICINE

## 2017-04-27 PROCEDURE — 700102 HCHG RX REV CODE 250 W/ 637 OVERRIDE(OP): Performed by: HOSPITALIST

## 2017-04-27 PROCEDURE — 770006 HCHG ROOM/CARE - MED/SURG/GYN SEMI*

## 2017-04-27 PROCEDURE — 700101 HCHG RX REV CODE 250: Performed by: INTERNAL MEDICINE

## 2017-04-27 PROCEDURE — 94640 AIRWAY INHALATION TREATMENT: CPT

## 2017-04-27 PROCEDURE — A9270 NON-COVERED ITEM OR SERVICE: HCPCS | Performed by: NURSE PRACTITIONER

## 2017-04-27 PROCEDURE — 82962 GLUCOSE BLOOD TEST: CPT | Mod: 91

## 2017-04-27 PROCEDURE — 700102 HCHG RX REV CODE 250 W/ 637 OVERRIDE(OP): Performed by: NURSE PRACTITIONER

## 2017-04-27 PROCEDURE — 94668 MNPJ CHEST WALL SBSQ: CPT

## 2017-04-27 RX ORDER — BUDESONIDE AND FORMOTEROL FUMARATE DIHYDRATE 160; 4.5 UG/1; UG/1
2 AEROSOL RESPIRATORY (INHALATION) 2 TIMES DAILY
Status: DISCONTINUED | OUTPATIENT
Start: 2017-04-27 | End: 2017-05-04 | Stop reason: HOSPADM

## 2017-04-27 RX ORDER — CARVEDILOL 3.12 MG/1
3.12 TABLET ORAL 2 TIMES DAILY WITH MEALS
Status: DISCONTINUED | OUTPATIENT
Start: 2017-04-27 | End: 2017-05-04 | Stop reason: HOSPADM

## 2017-04-27 RX ORDER — HYDROCORTISONE 20 MG/1
20 TABLET ORAL 2 TIMES DAILY
Status: DISCONTINUED | OUTPATIENT
Start: 2017-04-27 | End: 2017-05-04 | Stop reason: HOSPADM

## 2017-04-27 RX ORDER — LISINOPRIL 20 MG/1
20 TABLET ORAL
Status: DISCONTINUED | OUTPATIENT
Start: 2017-04-27 | End: 2017-05-04 | Stop reason: HOSPADM

## 2017-04-27 RX ORDER — FERROUS SULFATE 325(65) MG
325 TABLET ORAL
Status: DISCONTINUED | OUTPATIENT
Start: 2017-04-28 | End: 2017-04-28

## 2017-04-27 RX ADMIN — DULOXETINE HYDROCHLORIDE 60 MG: 60 CAPSULE, DELAYED RELEASE ORAL at 08:26

## 2017-04-27 RX ADMIN — TIOTROPIUM BROMIDE 1 CAPSULE: 18 CAPSULE ORAL; RESPIRATORY (INHALATION) at 07:23

## 2017-04-27 RX ADMIN — BUDESONIDE AND FORMOTEROL FUMARATE DIHYDRATE 2 PUFF: 160; 4.5 AEROSOL RESPIRATORY (INHALATION) at 20:05

## 2017-04-27 RX ADMIN — LEVOTHYROXINE SODIUM 75 MCG: 75 TABLET ORAL at 06:01

## 2017-04-27 RX ADMIN — HYDROCORTISONE 20 MG: 20 TABLET ORAL at 20:11

## 2017-04-27 RX ADMIN — TRAZODONE HYDROCHLORIDE 75 MG: 50 TABLET ORAL at 20:06

## 2017-04-27 RX ADMIN — THERA TABS 1 TABLET: TAB at 08:26

## 2017-04-27 RX ADMIN — OXYCODONE HYDROCHLORIDE 10 MG: 10 TABLET ORAL at 17:01

## 2017-04-27 RX ADMIN — INSULIN LISPRO 4 UNITS: 100 INJECTION, SOLUTION INTRAVENOUS; SUBCUTANEOUS at 08:20

## 2017-04-27 RX ADMIN — GABAPENTIN 600 MG: 300 CAPSULE ORAL at 08:26

## 2017-04-27 RX ADMIN — GABAPENTIN 600 MG: 300 CAPSULE ORAL at 20:06

## 2017-04-27 RX ADMIN — GABAPENTIN 600 MG: 300 CAPSULE ORAL at 14:48

## 2017-04-27 RX ADMIN — FAMOTIDINE 20 MG: 20 TABLET, FILM COATED ORAL at 08:26

## 2017-04-27 RX ADMIN — OXYCODONE HYDROCHLORIDE 10 MG: 10 TABLET ORAL at 10:00

## 2017-04-27 RX ADMIN — HYDROCORTISONE SODIUM SUCCINATE 50 MG: 100 INJECTION, POWDER, FOR SOLUTION INTRAMUSCULAR; INTRAVENOUS at 06:01

## 2017-04-27 RX ADMIN — PIPERACILLIN SODIUM AND TAZOBACTAM SODIUM 3.38 G: 3; .375 INJECTION, POWDER, FOR SOLUTION INTRAVENOUS at 18:01

## 2017-04-27 RX ADMIN — PIPERACILLIN SODIUM AND TAZOBACTAM SODIUM 3.38 G: 3; .375 INJECTION, POWDER, FOR SOLUTION INTRAVENOUS at 11:57

## 2017-04-27 RX ADMIN — INSULIN LISPRO 7 UNITS: 100 INJECTION, SOLUTION INTRAVENOUS; SUBCUTANEOUS at 12:29

## 2017-04-27 RX ADMIN — IPRATROPIUM BROMIDE AND ALBUTEROL SULFATE 3 ML: .5; 3 SOLUTION RESPIRATORY (INHALATION) at 07:22

## 2017-04-27 RX ADMIN — PIPERACILLIN SODIUM AND TAZOBACTAM SODIUM 3.38 G: 3; .375 INJECTION, POWDER, FOR SOLUTION INTRAVENOUS at 00:29

## 2017-04-27 RX ADMIN — INSULIN HUMAN 15 UNITS: 100 INJECTION, SUSPENSION SUBCUTANEOUS at 08:21

## 2017-04-27 RX ADMIN — PIPERACILLIN SODIUM AND TAZOBACTAM SODIUM 3.38 G: 3; .375 INJECTION, POWDER, FOR SOLUTION INTRAVENOUS at 23:54

## 2017-04-27 RX ADMIN — CYCLOBENZAPRINE HYDROCHLORIDE 10 MG: 10 TABLET, FILM COATED ORAL at 23:54

## 2017-04-27 RX ADMIN — MONTELUKAST SODIUM 10 MG: 10 TABLET, FILM COATED ORAL at 08:26

## 2017-04-27 RX ADMIN — LISINOPRIL 20 MG: 20 TABLET ORAL at 11:56

## 2017-04-27 RX ADMIN — BUDESONIDE AND FORMOTEROL FUMARATE DIHYDRATE 2 PUFF: 160; 4.5 AEROSOL RESPIRATORY (INHALATION) at 07:24

## 2017-04-27 RX ADMIN — PIPERACILLIN SODIUM AND TAZOBACTAM SODIUM 3.38 G: 3; .375 INJECTION, POWDER, FOR SOLUTION INTRAVENOUS at 06:01

## 2017-04-27 RX ADMIN — CARVEDILOL 3.12 MG: 3.12 TABLET, FILM COATED ORAL at 17:00

## 2017-04-27 RX ADMIN — INSULIN HUMAN 15 UNITS: 100 INJECTION, SUSPENSION SUBCUTANEOUS at 17:08

## 2017-04-27 RX ADMIN — OXYCODONE HYDROCHLORIDE 20 MG: 10 TABLET ORAL at 23:06

## 2017-04-27 RX ADMIN — SIMVASTATIN 10 MG: 20 TABLET, FILM COATED ORAL at 20:05

## 2017-04-27 ASSESSMENT — ENCOUNTER SYMPTOMS
SHORTNESS OF BREATH: 1
DIZZINESS: 0
FEVER: 0
CHILLS: 0
VOMITING: 0
DIARRHEA: 0
NAUSEA: 0
HEADACHES: 0
ABDOMINAL PAIN: 0
BACK PAIN: 1
LOSS OF CONSCIOUSNESS: 0

## 2017-04-27 ASSESSMENT — PAIN SCALES - GENERAL
PAINLEVEL_OUTOF10: 6
PAINLEVEL_OUTOF10: 3
PAINLEVEL_OUTOF10: 7
PAINLEVEL_OUTOF10: 4
PAINLEVEL_OUTOF10: 6
PAINLEVEL_OUTOF10: 5

## 2017-04-27 NOTE — CARE PLAN
Problem: Pain Management  Goal: Pain level will decrease to patient’s comfort goal  Outcome: PROGRESSING AS EXPECTED  Patient educated on pain management plan and verbalized understanding. Provided patient with PRN availability     Problem: Respiratory:  Goal: Respiratory status will improve  Outcome: PROGRESSING AS EXPECTED  IS at bedside, educated patient on importance of use. Patient demonstrated proper usage technique and verbalized understanding

## 2017-04-27 NOTE — PROGRESS NOTES
Pt transferred to [facility] via hospital bed escorted by RN.  All belongings with pt.  Bed side report given to RN.

## 2017-04-27 NOTE — PROGRESS NOTES
Received patient report from RN. Patient is AAOX4, and LDAs assessed. Oriented patient to unit and unit procedures including hourly rounding and daily POC. Call light within reach all questions and concerns addressed. Patient verbalized understanding

## 2017-04-27 NOTE — DISCHARGE PLANNING
Per [facility], pt owes $1,304.77.  The representative will email the hardship letter to provide to the pt.  SW will provide letter to pt once received.  Pt will last years tax return, bank statement, and a denial from Medicaid.     MOHAMUD requested PFA screen pt.

## 2017-04-27 NOTE — PROGRESS NOTES
Hospital Medicine Progress Note, Adult, Complex               Author: Lora Dewey  Date & Time created: 4/27/2017  11:43 AM     ID/CC: 67yo with multiple medical problems (COPD, CKD III, HFPLVEF, DM, HTN) found down at home altered and hypoxic.  Admitted with Dx of HCAP, sepsis       Interval History:  Pt seen and examined, afebrile transferred from ICU, no overnight events, denies any CP, nausea or vomiting, still SOB but improving. For HCAP  cont zosyn and waiting on sputum culture.     Review of Systems:  Review of Systems   Constitutional: Negative for fever and chills.   Respiratory: Positive for shortness of breath.    Cardiovascular: Negative for chest pain.   Gastrointestinal: Negative for nausea, vomiting, abdominal pain and diarrhea.   Musculoskeletal: Positive for back pain.   Skin: Negative for rash.   Neurological: Negative for dizziness, loss of consciousness and headaches.       Physical Exam:  Physical Exam   Constitutional: She is oriented to person, place, and time. She appears well-developed and well-nourished. No distress.   HENT:   Head: Normocephalic and atraumatic.   Eyes: Conjunctivae are normal. No scleral icterus.   Neck: Neck supple. No JVD present.   Cardiovascular: Normal rate and regular rhythm.    Murmur heard.  Pulmonary/Chest: Effort normal. No stridor. No respiratory distress. She has no wheezes. She has rales.   Abdominal: Soft. There is no tenderness. There is no rebound and no guarding.   Musculoskeletal: She exhibits edema.   Trace to 1+ edema   Neurological: She is alert and oriented to person, place, and time.   Skin: Skin is warm and dry. No rash noted. She is not diaphoretic.   Psychiatric: She has a normal mood and affect. Thought content normal.   Nursing note and vitals reviewed.      Labs:  Recent Labs      04/24/17   2300   [id number]  7.37*   [id number]  41.6*   [id number]  130.9*   WSCM7RPV  98.0   ARTHCO3  23   ARTBE  -2     Recent Labs      04/24/17   2211  04/25/17    04   0845   CKMB  5.8*   --    --    TROPONINI  0.09*  0.30*  0.33*   BNPBTYPENAT  308*   --    --      Recent Labs      175  17   0510   SODIUM  136  137  138   POTASSIUM  5.8*  4.9  4.7   CHLORIDE  104  110  109   CO2  24  21  25   BUN  40*  32*  32*   CREATININE  1.96*  1.52*  1.43*   CALCIUM  8.9  7.7*  8.5     Recent Labs      175  17   0510   ALTSGPT  18   --   19   ASTSGOT  20   --   13   ALKPHOSPHAT  66   --   55   TBILIRUBIN  0.7   --   0.6   GLUCOSE  205*  234*  202*     Recent Labs      1745  17   0510  17   0918  17   0950   RBC  2.83*   --   2.22*   --   2.76*   HEMOGLOBIN  8.0*   --   6.3*   --   7.7*   HEMATOCRIT  26.2*   --   20.5*   --   24.4*   PLATELETCT  216   --   129*   --   167   PROTHROMBTM  14.3   --    --    --    --    INR  1.08   --    --    --    --    IRON   --   10*   --   20*   --    TOTIRONBC   --   259   --   245*   --      Recent Labs      17   0510  17   0950   WBC  12.1*  9.4  10.1   NEUTSPOLYS  83.30*  89.40*   --    LYMPHOCYTES  7.90*  5.30*   --    MONOCYTES  1.80  3.50   --    EOSINOPHILS  0.90  0.00   --    BASOPHILS  1.70  0.00   --    ASTSGOT  20  13   --    ALTSGPT  18  19   --    ALKPHOSPHAT  66  55   --    TBILIRUBIN  0.7  0.6   --            Hemodynamics:  Temp (24hrs), Av.6 °C (97.8 °F), Min:36.1 °C (96.9 °F), Max:37.1 °C (98.7 °F)  Temperature: 36.6 °C (97.9 °F)  Pulse  Av.1  Min: 62  Max: 102Heart Rate (Monitored): 77  Blood Pressure : (!) 166/60 mmHg, NIBP: 151/66 mmHg     Respiratory:    Respiration: 18, Pulse Oximetry: 96 %, O2 Daily Delivery Respiratory : Silicone Nasal Cannula     Given By:: Mouthpiece, #MDI/DPI Given: MDI/DPI x 2, PEP/CPT Method: Positive Airway Pressure Device, Work Of Breathing / Effort: Mild  RUL Breath Sounds: Expiratory Wheezes, RML Breath Sounds: Diminished, RLL Breath  Sounds: Diminished, BIRD Breath Sounds: Expiratory Wheezes, LLL Breath Sounds: Diminished  Fluids:    Intake/Output Summary (Last 24 hours) at 04/27/17 1143  Last data filed at 04/27/17 0900   Gross per 24 hour   Intake    980 ml   Output    600 ml   Net    380 ml        GI/Nutrition:  Orders Placed This Encounter   Procedures   • Diet Order     Standing Status: Standing      Number of Occurrences: 1      Standing Expiration Date:      Order Specific Question:  Diet:     Answer:  Diabetic [3]     Medical Decision Making, by Problem:  Active Hospital Problems    Diagnosis   • Hypertensive heart disease with heart failure (CMS-HCC) [I11.0]  GD II diastolic dysfunction with preserved LVEF  Slowly starting her ACE and BB that she takes as outpt.    • HCAP (healthcare-associated pneumonia) [J18.9]  -cont Zosyn  -Resp culture pending    • COPD (chronic obstructive pulmonary disease) (CMS-HCC) [J44.9]  Baseline 3.5 litres at home, on 4 L here    • DM type 2, uncontrolled, with renal complications (CMS-HCC) [E11.29, E11.65]  7.9 A1c earlier this year  Cont  NPH and SSI   Pt also on steroid so  Will follow and titrate   • Iron deficiency anemia [D50.9]  Repeat Fe studies low   Start iron supplement      • HTN (hypertension) [I10]  restarting her coreg and lisinopril    • Sepsis (CMS-HCC) [A41.9]  Respiratory source  Recent health care exposure vs aspiration  SLP eval  MRSA swab neg so dc'd Vanc cont zosyn  Follow respiratory cultures   • Elevated troponin [R79.89]  No significant EKG changes  Likely demand ischemia  Pt is ASx'c  Cont to follow   • EDITH (obstructive sleep apnea) [G47.33]   • Chronic kidney disease, stage 3 [N18.3]  HAN resolved with IVF's  Back to her baseline Creat  Avoid Nephrotoxins     Dispo: Pt/OT eval   Medications reviewed, EKG reviewed, Labs reviewed and Radiology images reviewed        DVT Prophylaxis: Heparin  DVT prophylaxis - mechanical: SCDs  Ulcer prophylaxis: Yes  Antibiotics: Treating active  infection/contamination beyond 24 hours perioperative coverage

## 2017-04-27 NOTE — CARE PLAN
Problem: Pain Management  Goal: Pain level will decrease to patient’s comfort goal  Intervention: Follow pain managment plan developed in collaboration with patient and Interdisciplinary Team  Pts home flexeril ordered to manage back pain along with prn oxycodone       Problem: Respiratory:  Goal: Respiratory status will improve  Intervention: Collaborate with respiratory therapist and Interdisciplinary Team on treatment measures to improve respiratory function  Pt has RT protocol, pt receiving scheduled treatments

## 2017-04-27 NOTE — PROGRESS NOTES
Report received. Assumed care, assessment complete. Pt is A & O x 4.  Pt medicated for pain per MAR. Fall precautions and appropriate signs in place. Pt oriented to unit routine, call light/phone system and RN extension number provided. Pt educated regarding fall precautions. Bed alarm refused with education. Pt denies any additional needs at this time. Call light within reach.

## 2017-04-27 NOTE — DISCHARGE PLANNING
MOHAMUD contacted [facility] to get pt's outstanding balance.  Someone from the billing department will contact MOHAMUD with the amount.  MOHAMUD will f/u.

## 2017-04-28 LAB
ANION GAP SERPL CALC-SCNC: 9 MMOL/L (ref 0–11.9)
BUN SERPL-MCNC: 38 MG/DL (ref 8–22)
CALCIUM SERPL-MCNC: 8.8 MG/DL (ref 8.5–10.5)
CHLORIDE SERPL-SCNC: 110 MMOL/L (ref 96–112)
CO2 SERPL-SCNC: 23 MMOL/L (ref 20–33)
CREAT SERPL-MCNC: 1.3 MG/DL (ref 0.5–1.4)
ERYTHROCYTE [DISTWIDTH] IN BLOOD BY AUTOMATED COUNT: 59.9 FL (ref 35.9–50)
GFR SERPL CREATININE-BSD FRML MDRD: 41 ML/MIN/1.73 M 2
GLUCOSE BLD-MCNC: 100 MG/DL (ref 65–99)
GLUCOSE BLD-MCNC: 113 MG/DL (ref 65–99)
GLUCOSE BLD-MCNC: 161 MG/DL (ref 65–99)
GLUCOSE BLD-MCNC: 74 MG/DL (ref 65–99)
GLUCOSE SERPL-MCNC: 88 MG/DL (ref 65–99)
HCT VFR BLD AUTO: 24.2 % (ref 37–47)
HGB BLD-MCNC: 7.6 G/DL (ref 12–16)
MCH RBC QN AUTO: 28.5 PG (ref 27–33)
MCHC RBC AUTO-ENTMCNC: 31.4 G/DL (ref 33.6–35)
MCV RBC AUTO: 90.6 FL (ref 81.4–97.8)
PLATELET # BLD AUTO: 177 K/UL (ref 164–446)
PMV BLD AUTO: 9.6 FL (ref 9–12.9)
POTASSIUM SERPL-SCNC: 4 MMOL/L (ref 3.6–5.5)
RBC # BLD AUTO: 2.67 M/UL (ref 4.2–5.4)
SODIUM SERPL-SCNC: 142 MMOL/L (ref 135–145)
WBC # BLD AUTO: 9 K/UL (ref 4.8–10.8)

## 2017-04-28 PROCEDURE — 770006 HCHG ROOM/CARE - MED/SURG/GYN SEMI*

## 2017-04-28 PROCEDURE — A9270 NON-COVERED ITEM OR SERVICE: HCPCS | Performed by: INTERNAL MEDICINE

## 2017-04-28 PROCEDURE — 82962 GLUCOSE BLOOD TEST: CPT

## 2017-04-28 PROCEDURE — 700105 HCHG RX REV CODE 258: Performed by: HOSPITALIST

## 2017-04-28 PROCEDURE — 700111 HCHG RX REV CODE 636 W/ 250 OVERRIDE (IP): Performed by: HOSPITALIST

## 2017-04-28 PROCEDURE — 80048 BASIC METABOLIC PNL TOTAL CA: CPT

## 2017-04-28 PROCEDURE — A9270 NON-COVERED ITEM OR SERVICE: HCPCS | Performed by: HOSPITALIST

## 2017-04-28 PROCEDURE — 700102 HCHG RX REV CODE 250 W/ 637 OVERRIDE(OP): Performed by: HOSPITALIST

## 2017-04-28 PROCEDURE — 99232 SBSQ HOSP IP/OBS MODERATE 35: CPT | Performed by: INTERNAL MEDICINE

## 2017-04-28 PROCEDURE — 85027 COMPLETE CBC AUTOMATED: CPT

## 2017-04-28 PROCEDURE — 94760 N-INVAS EAR/PLS OXIMETRY 1: CPT

## 2017-04-28 PROCEDURE — 700102 HCHG RX REV CODE 250 W/ 637 OVERRIDE(OP): Performed by: INTERNAL MEDICINE

## 2017-04-28 RX ORDER — FERROUS SULFATE 325(65) MG
325 TABLET ORAL 2 TIMES DAILY WITH MEALS
Status: DISCONTINUED | OUTPATIENT
Start: 2017-04-28 | End: 2017-05-04 | Stop reason: HOSPADM

## 2017-04-28 RX ADMIN — GABAPENTIN 600 MG: 300 CAPSULE ORAL at 15:13

## 2017-04-28 RX ADMIN — MONTELUKAST SODIUM 10 MG: 10 TABLET, FILM COATED ORAL at 08:59

## 2017-04-28 RX ADMIN — DULOXETINE HYDROCHLORIDE 60 MG: 60 CAPSULE, DELAYED RELEASE ORAL at 09:02

## 2017-04-28 RX ADMIN — BUDESONIDE AND FORMOTEROL FUMARATE DIHYDRATE 2 PUFF: 160; 4.5 AEROSOL RESPIRATORY (INHALATION) at 09:02

## 2017-04-28 RX ADMIN — OXYCODONE HYDROCHLORIDE 20 MG: 10 TABLET ORAL at 15:13

## 2017-04-28 RX ADMIN — FAMOTIDINE 20 MG: 20 TABLET, FILM COATED ORAL at 09:01

## 2017-04-28 RX ADMIN — OXYCODONE HYDROCHLORIDE 20 MG: 10 TABLET ORAL at 09:00

## 2017-04-28 RX ADMIN — CARVEDILOL 3.12 MG: 3.12 TABLET, FILM COATED ORAL at 17:48

## 2017-04-28 RX ADMIN — PIPERACILLIN SODIUM AND TAZOBACTAM SODIUM 3.38 G: 3; .375 INJECTION, POWDER, FOR SOLUTION INTRAVENOUS at 12:38

## 2017-04-28 RX ADMIN — TRAZODONE HYDROCHLORIDE 75 MG: 50 TABLET ORAL at 20:33

## 2017-04-28 RX ADMIN — GABAPENTIN 600 MG: 300 CAPSULE ORAL at 20:33

## 2017-04-28 RX ADMIN — LEVOTHYROXINE SODIUM 75 MCG: 75 TABLET ORAL at 05:37

## 2017-04-28 RX ADMIN — INSULIN LISPRO 3 UNITS: 100 INJECTION, SOLUTION INTRAVENOUS; SUBCUTANEOUS at 17:40

## 2017-04-28 RX ADMIN — PIPERACILLIN SODIUM AND TAZOBACTAM SODIUM 3.38 G: 3; .375 INJECTION, POWDER, FOR SOLUTION INTRAVENOUS at 23:51

## 2017-04-28 RX ADMIN — SIMVASTATIN 10 MG: 20 TABLET, FILM COATED ORAL at 20:32

## 2017-04-28 RX ADMIN — HYDROCORTISONE 20 MG: 20 TABLET ORAL at 09:01

## 2017-04-28 RX ADMIN — FERROUS SULFATE TAB 325 MG (65 MG ELEMENTAL FE) 325 MG: 325 (65 FE) TAB at 09:01

## 2017-04-28 RX ADMIN — THERA TABS 1 TABLET: TAB at 08:59

## 2017-04-28 RX ADMIN — INSULIN HUMAN 15 UNITS: 100 INJECTION, SUSPENSION SUBCUTANEOUS at 17:41

## 2017-04-28 RX ADMIN — CARVEDILOL 3.12 MG: 3.12 TABLET, FILM COATED ORAL at 09:01

## 2017-04-28 RX ADMIN — FERROUS SULFATE TAB 325 MG (65 MG ELEMENTAL FE) 325 MG: 325 (65 FE) TAB at 17:48

## 2017-04-28 RX ADMIN — OXYCODONE HYDROCHLORIDE 20 MG: 10 TABLET ORAL at 21:21

## 2017-04-28 RX ADMIN — PIPERACILLIN SODIUM AND TAZOBACTAM SODIUM 3.38 G: 3; .375 INJECTION, POWDER, FOR SOLUTION INTRAVENOUS at 17:48

## 2017-04-28 RX ADMIN — BUDESONIDE AND FORMOTEROL FUMARATE DIHYDRATE 2 PUFF: 160; 4.5 AEROSOL RESPIRATORY (INHALATION) at 20:34

## 2017-04-28 RX ADMIN — LISINOPRIL 20 MG: 20 TABLET ORAL at 09:00

## 2017-04-28 RX ADMIN — HYDROCORTISONE 20 MG: 20 TABLET ORAL at 20:32

## 2017-04-28 RX ADMIN — GABAPENTIN 600 MG: 300 CAPSULE ORAL at 09:00

## 2017-04-28 RX ADMIN — TIOTROPIUM BROMIDE 1 CAPSULE: 18 CAPSULE ORAL; RESPIRATORY (INHALATION) at 09:02

## 2017-04-28 RX ADMIN — PIPERACILLIN SODIUM AND TAZOBACTAM SODIUM 3.38 G: 3; .375 INJECTION, POWDER, FOR SOLUTION INTRAVENOUS at 05:36

## 2017-04-28 ASSESSMENT — PAIN SCALES - GENERAL
PAINLEVEL_OUTOF10: 2
PAINLEVEL_OUTOF10: 5
PAINLEVEL_OUTOF10: 3
PAINLEVEL_OUTOF10: 6
PAINLEVEL_OUTOF10: 4
PAINLEVEL_OUTOF10: 6
PAINLEVEL_OUTOF10: 3
PAINLEVEL_OUTOF10: 6
PAINLEVEL_OUTOF10: 3
PAINLEVEL_OUTOF10: 4

## 2017-04-28 ASSESSMENT — ENCOUNTER SYMPTOMS
CHILLS: 0
HEADACHES: 0
VOMITING: 0
DIZZINESS: 0
ABDOMINAL PAIN: 0
FEVER: 0
BACK PAIN: 1
NAUSEA: 0
LOSS OF CONSCIOUSNESS: 0
DIARRHEA: 0
SHORTNESS OF BREATH: 1

## 2017-04-28 ASSESSMENT — COPD QUESTIONNAIRES
DO YOU EVER COUGH UP ANY MUCUS OR PHLEGM?: YES, EVERY DAY
DURING THE PAST 4 WEEKS HOW MUCH DID YOU FEEL SHORT OF BREATH: MOST  OR ALL OF THE TIME
COPD SCREENING SCORE: 7
HAVE YOU SMOKED AT LEAST 100 CIGARETTES IN YOUR ENTIRE LIFE: NO/DON'T KNOW

## 2017-04-28 NOTE — RESPIRATORY CARE
COPD EDUCATION by COPD CLINICAL EDUCATOR  4/28/2017 at 6:43 AM by Aida Vega     Patient interviewed by COPD education team. Patient refused COPD program at this time.

## 2017-04-28 NOTE — PROGRESS NOTES
Assumed care of patient at 0700. A&Ox4. Pleasant attitude. Reports pain in back and legs. Medicated per MAR. No signs of distress. Bruising on the upper extremities. Patient sits up at edge of bed well. Denies Lovenox and Senna. Educated patient about risk of constipation with iron supplementation. Bed in lowest position. Treaded socks in use. Call light within reach. Will call for assistance.

## 2017-04-28 NOTE — CARE PLAN
"Problem: Infection  Goal: Will remain free from infection  Intervention: Assess for removal of potential routes of infection, such as IV, central line, intra-arterial or urinary catheters  Central line in place as pt is a very difficult stick to obtain new IV access, ok'd per MD. Pt has bruising to BUE from previous IV attempts. Pt received CHG wipes per protocol to protect central line from infection.       Problem: Bowel/Gastric:  Goal: Will not experience complications related to bowel motility  Intervention: Assess baseline bowel pattern  Pt declined stool softener for the evening, reported \"I had a normal BM today.\"          "

## 2017-04-28 NOTE — PROGRESS NOTES
Hospital Medicine Progress Note, Adult, Complex               Author: Lora Dewey  Date & Time created: 4/28/2017  12:36 PM     ID/CC: 65yo with multiple medical problems (COPD, CKD III, HFPLVEF, DM, HTN) found down at home altered and hypoxic.  Admitted with Dx of HCAP, sepsis       Interval History:  No overnight events, afebrile, no nausea or vomiting, afebrile, SOB improving, Sputum culture pending, cont zosyn.,     Review of Systems:  Review of Systems   Constitutional: Negative for fever and chills.   Respiratory: Positive for shortness of breath.    Cardiovascular: Negative for chest pain.   Gastrointestinal: Negative for nausea, vomiting, abdominal pain and diarrhea.   Musculoskeletal: Positive for back pain.   Skin: Negative for rash.   Neurological: Negative for dizziness, loss of consciousness and headaches.       Physical Exam:  Physical Exam   Constitutional: She is oriented to person, place, and time. She appears well-developed and well-nourished. No distress.   HENT:   Head: Normocephalic and atraumatic.   Eyes: Conjunctivae are normal. No scleral icterus.   Neck: Neck supple. No JVD present.   Cardiovascular: Normal rate and regular rhythm.    Murmur heard.  Pulmonary/Chest: Effort normal. No stridor. No respiratory distress. She has no wheezes. She has rales.   Abdominal: Soft. There is no tenderness. There is no rebound and no guarding.   Musculoskeletal: She exhibits edema.   Trace to 1+ edema   Neurological: She is alert and oriented to person, place, and time.   Skin: Skin is warm and dry. No rash noted. She is not diaphoretic.   Psychiatric: She has a normal mood and affect. Thought content normal.   Nursing note and vitals reviewed.      Labs:        Invalid input(s): [id number]      Recent Labs      04/26/17   0510  04/28/17   0535   SODIUM  138  142   POTASSIUM  4.7  4.0   CHLORIDE  109  110   CO2  25  23   BUN  32*  38*   CREATININE  1.43*  1.30   CALCIUM  8.5  8.8     Recent Labs       17   0510  17   0535   ALTSGPT  19   --    ASTSGOT  13   --    ALKPHOSPHAT  55   --    TBILIRUBIN  0.6   --    GLUCOSE  202*  88     Recent Labs      17   0510  17   0918  17   0950  17   0535   RBC  2.22*   --   2.76*  2.67*   HEMOGLOBIN  6.3*   --   7.7*  7.6*   HEMATOCRIT  20.5*   --   24.4*  24.2*   PLATELETCT  129*   --   167  177   IRON   --   20*   --    --    TOTIRONBC   --   245*   --    --      Recent Labs      17   0510  17   0950  17   0535   WBC  9.4  10.1  9.0   NEUTSPOLYS  89.40*   --    --    LYMPHOCYTES  5.30*   --    --    MONOCYTES  3.50   --    --    EOSINOPHILS  0.00   --    --    BASOPHILS  0.00   --    --    ASTSGOT  13   --    --    ALTSGPT  19   --    --    ALKPHOSPHAT  55   --    --    TBILIRUBIN  0.6   --    --            Hemodynamics:  Temp (24hrs), Av.4 °C (97.5 °F), Min:36.1 °C (97 °F), Max:36.7 °C (98 °F)  Temperature: 36.1 °C (97 °F)  Pulse  Av.7  Min: 58  Max: 102   Blood Pressure : (!) 161/69 mmHg (RN notified)     Respiratory:    Respiration: 18, Pulse Oximetry: 95 %, O2 Daily Delivery Respiratory : Silicone Nasal Cannula     Work Of Breathing / Effort: Mild  RUL Breath Sounds: Diminished, RML Breath Sounds: Diminished, RLL Breath Sounds: Diminished, BIRD Breath Sounds: Diminished, LLL Breath Sounds: Diminished  Fluids:    Intake/Output Summary (Last 24 hours) at 17 1236  Last data filed at 17 0900   Gross per 24 hour   Intake    540 ml   Output      0 ml   Net    540 ml        GI/Nutrition:  Orders Placed This Encounter   Procedures   • Diet Order     Standing Status: Standing      Number of Occurrences: 1      Standing Expiration Date:      Order Specific Question:  Diet:     Answer:  Diabetic [3]     Medical Decision Making, by Problem:  Active Hospital Problems    Diagnosis   • Hypertensive heart disease with heart failure (CMS-HCC) [I11.0]  GD II diastolic dysfunction with preserved LVEF  Slowly starting  her ACE and BB that she takes as outpt.    • HCAP (healthcare-associated pneumonia) [J18.9]  -cont Zosyn  -Resp culture pending    • COPD (chronic obstructive pulmonary disease) (CMS-HCC) [J44.9]  Baseline 3.5 litres at home, on 4 L here    • DM type 2, uncontrolled, with renal complications (CMS-HCC) [E11.29, E11.65]  7.9 A1c earlier this year  Cont  NPH and SSI   Pt also on steroid so  Will follow and titrate   • Iron deficiency anemia [D50.9]  Repeat Fe studies low   Start iron supplement      • HTN (hypertension) [I10]  restarting her coreg and lisinopril    • Sepsis (CMS-HCC) [A41.9]  Respiratory source  Recent health care exposure vs aspiration  SLP eval  MRSA swab neg so dc'd Vanc cont zosyn  Follow respiratory cultures   • Elevated troponin [R79.89]  No significant EKG changes  Likely demand ischemia  Pt is ASx'c  Cont to follow   • EDITH (obstructive sleep apnea) [G47.33]   • Chronic kidney disease, stage 3 [N18.3]  HAN resolved with IVF's  Back to her baseline Creat  Avoid Nephrotoxins     Dispo: Pt/OT eval   Medications reviewed, EKG reviewed, Labs reviewed and Radiology images reviewed        DVT Prophylaxis: Heparin  DVT prophylaxis - mechanical: SCDs  Ulcer prophylaxis: Yes  Antibiotics: Treating active infection/contamination beyond 24 hours perioperative coverage

## 2017-04-28 NOTE — PROGRESS NOTES
Rec'd report from day shift RN. Assumed pt care. Assessment completed. AA&OX4. Denies pain at this time. No s/s of discomfort or distress. Pt ambulates to the bathroom with a steady gait. Strong dry cough noted, vacuum suction is available at bedside. Generalized bruising noted to BUE. Right IJ is patent and intact. Bed in lowest position, bed locked, slippers used to ambulate, RN and CNA numbers provided, call light within reach.

## 2017-04-29 LAB
ANION GAP SERPL CALC-SCNC: 7 MMOL/L (ref 0–11.9)
BUN SERPL-MCNC: 35 MG/DL (ref 8–22)
CALCIUM SERPL-MCNC: 8.7 MG/DL (ref 8.5–10.5)
CHLORIDE SERPL-SCNC: 108 MMOL/L (ref 96–112)
CO2 SERPL-SCNC: 24 MMOL/L (ref 20–33)
CREAT SERPL-MCNC: 1.38 MG/DL (ref 0.5–1.4)
ERYTHROCYTE [DISTWIDTH] IN BLOOD BY AUTOMATED COUNT: 59.5 FL (ref 35.9–50)
GFR SERPL CREATININE-BSD FRML MDRD: 38 ML/MIN/1.73 M 2
GLUCOSE BLD-MCNC: 111 MG/DL (ref 65–99)
GLUCOSE BLD-MCNC: 113 MG/DL (ref 65–99)
GLUCOSE BLD-MCNC: 149 MG/DL (ref 65–99)
GLUCOSE BLD-MCNC: 170 MG/DL (ref 65–99)
GLUCOSE SERPL-MCNC: 111 MG/DL (ref 65–99)
HCT VFR BLD AUTO: 25.5 % (ref 37–47)
HGB BLD-MCNC: 7.8 G/DL (ref 12–16)
MCH RBC QN AUTO: 27.9 PG (ref 27–33)
MCHC RBC AUTO-ENTMCNC: 30.6 G/DL (ref 33.6–35)
MCV RBC AUTO: 91.1 FL (ref 81.4–97.8)
PLATELET # BLD AUTO: 195 K/UL (ref 164–446)
PMV BLD AUTO: 8.9 FL (ref 9–12.9)
POTASSIUM SERPL-SCNC: 4.3 MMOL/L (ref 3.6–5.5)
RBC # BLD AUTO: 2.8 M/UL (ref 4.2–5.4)
SODIUM SERPL-SCNC: 139 MMOL/L (ref 135–145)
WBC # BLD AUTO: 9.2 K/UL (ref 4.8–10.8)

## 2017-04-29 PROCEDURE — A9270 NON-COVERED ITEM OR SERVICE: HCPCS | Performed by: HOSPITALIST

## 2017-04-29 PROCEDURE — 85027 COMPLETE CBC AUTOMATED: CPT

## 2017-04-29 PROCEDURE — 700102 HCHG RX REV CODE 250 W/ 637 OVERRIDE(OP): Performed by: HOSPITALIST

## 2017-04-29 PROCEDURE — 700102 HCHG RX REV CODE 250 W/ 637 OVERRIDE(OP): Performed by: NURSE PRACTITIONER

## 2017-04-29 PROCEDURE — A9270 NON-COVERED ITEM OR SERVICE: HCPCS | Performed by: NURSE PRACTITIONER

## 2017-04-29 PROCEDURE — 700105 HCHG RX REV CODE 258: Performed by: HOSPITALIST

## 2017-04-29 PROCEDURE — 99232 SBSQ HOSP IP/OBS MODERATE 35: CPT | Performed by: INTERNAL MEDICINE

## 2017-04-29 PROCEDURE — 82962 GLUCOSE BLOOD TEST: CPT

## 2017-04-29 PROCEDURE — 700111 HCHG RX REV CODE 636 W/ 250 OVERRIDE (IP): Performed by: HOSPITALIST

## 2017-04-29 PROCEDURE — 770006 HCHG ROOM/CARE - MED/SURG/GYN SEMI*

## 2017-04-29 PROCEDURE — A9270 NON-COVERED ITEM OR SERVICE: HCPCS | Performed by: INTERNAL MEDICINE

## 2017-04-29 PROCEDURE — 700102 HCHG RX REV CODE 250 W/ 637 OVERRIDE(OP): Performed by: INTERNAL MEDICINE

## 2017-04-29 PROCEDURE — 80048 BASIC METABOLIC PNL TOTAL CA: CPT

## 2017-04-29 RX ADMIN — HYDROCORTISONE 20 MG: 20 TABLET ORAL at 09:23

## 2017-04-29 RX ADMIN — PIPERACILLIN SODIUM AND TAZOBACTAM SODIUM 3.38 G: 3; .375 INJECTION, POWDER, FOR SOLUTION INTRAVENOUS at 18:00

## 2017-04-29 RX ADMIN — LISINOPRIL 20 MG: 20 TABLET ORAL at 09:19

## 2017-04-29 RX ADMIN — FERROUS SULFATE TAB 325 MG (65 MG ELEMENTAL FE) 325 MG: 325 (65 FE) TAB at 17:42

## 2017-04-29 RX ADMIN — BUDESONIDE AND FORMOTEROL FUMARATE DIHYDRATE 2 PUFF: 160; 4.5 AEROSOL RESPIRATORY (INHALATION) at 09:20

## 2017-04-29 RX ADMIN — MONTELUKAST SODIUM 10 MG: 10 TABLET, FILM COATED ORAL at 09:19

## 2017-04-29 RX ADMIN — INSULIN HUMAN 15 UNITS: 100 INJECTION, SUSPENSION SUBCUTANEOUS at 09:27

## 2017-04-29 RX ADMIN — CARVEDILOL 3.12 MG: 3.12 TABLET, FILM COATED ORAL at 09:19

## 2017-04-29 RX ADMIN — DULOXETINE HYDROCHLORIDE 60 MG: 60 CAPSULE, DELAYED RELEASE ORAL at 09:19

## 2017-04-29 RX ADMIN — FERROUS SULFATE TAB 325 MG (65 MG ELEMENTAL FE) 325 MG: 325 (65 FE) TAB at 09:19

## 2017-04-29 RX ADMIN — INSULIN HUMAN 15 UNITS: 100 INJECTION, SUSPENSION SUBCUTANEOUS at 17:45

## 2017-04-29 RX ADMIN — TRAZODONE HYDROCHLORIDE 75 MG: 50 TABLET ORAL at 20:09

## 2017-04-29 RX ADMIN — INSULIN LISPRO 3 UNITS: 100 INJECTION, SOLUTION INTRAVENOUS; SUBCUTANEOUS at 09:26

## 2017-04-29 RX ADMIN — HYDROCORTISONE 20 MG: 20 TABLET ORAL at 20:14

## 2017-04-29 RX ADMIN — PIPERACILLIN SODIUM AND TAZOBACTAM SODIUM 3.38 G: 3; .375 INJECTION, POWDER, FOR SOLUTION INTRAVENOUS at 12:23

## 2017-04-29 RX ADMIN — GABAPENTIN 600 MG: 300 CAPSULE ORAL at 09:19

## 2017-04-29 RX ADMIN — FAMOTIDINE 20 MG: 20 TABLET, FILM COATED ORAL at 09:19

## 2017-04-29 RX ADMIN — TIOTROPIUM BROMIDE 1 CAPSULE: 18 CAPSULE ORAL; RESPIRATORY (INHALATION) at 09:25

## 2017-04-29 RX ADMIN — CARVEDILOL 3.12 MG: 3.12 TABLET, FILM COATED ORAL at 17:42

## 2017-04-29 RX ADMIN — OXYCODONE HYDROCHLORIDE 20 MG: 10 TABLET ORAL at 15:25

## 2017-04-29 RX ADMIN — OXYCODONE HYDROCHLORIDE AND ACETAMINOPHEN 500 MG: 500 TABLET ORAL at 09:19

## 2017-04-29 RX ADMIN — CYCLOBENZAPRINE HYDROCHLORIDE 10 MG: 10 TABLET, FILM COATED ORAL at 20:14

## 2017-04-29 RX ADMIN — OXYCODONE HYDROCHLORIDE 20 MG: 10 TABLET ORAL at 21:26

## 2017-04-29 RX ADMIN — BUDESONIDE AND FORMOTEROL FUMARATE DIHYDRATE 2 PUFF: 160; 4.5 AEROSOL RESPIRATORY (INHALATION) at 20:14

## 2017-04-29 RX ADMIN — GABAPENTIN 600 MG: 300 CAPSULE ORAL at 20:09

## 2017-04-29 RX ADMIN — PIPERACILLIN SODIUM AND TAZOBACTAM SODIUM 3.38 G: 3; .375 INJECTION, POWDER, FOR SOLUTION INTRAVENOUS at 05:51

## 2017-04-29 RX ADMIN — OXYCODONE HYDROCHLORIDE 20 MG: 10 TABLET ORAL at 09:33

## 2017-04-29 RX ADMIN — LEVOTHYROXINE SODIUM 75 MCG: 75 TABLET ORAL at 05:51

## 2017-04-29 RX ADMIN — GABAPENTIN 600 MG: 300 CAPSULE ORAL at 15:10

## 2017-04-29 RX ADMIN — SIMVASTATIN 10 MG: 20 TABLET, FILM COATED ORAL at 20:08

## 2017-04-29 RX ADMIN — THERA TABS 1 TABLET: TAB at 09:19

## 2017-04-29 ASSESSMENT — PAIN SCALES - GENERAL
PAINLEVEL_OUTOF10: 2
PAINLEVEL_OUTOF10: 3
PAINLEVEL_OUTOF10: 2
PAINLEVEL_OUTOF10: 6
PAINLEVEL_OUTOF10: 3
PAINLEVEL_OUTOF10: 7
PAINLEVEL_OUTOF10: 2
PAINLEVEL_OUTOF10: 7
PAINLEVEL_OUTOF10: 8
PAINLEVEL_OUTOF10: 2

## 2017-04-29 ASSESSMENT — ENCOUNTER SYMPTOMS
LOSS OF CONSCIOUSNESS: 0
FEVER: 0
VOMITING: 0
CHILLS: 0
DIZZINESS: 0
NAUSEA: 0
HEADACHES: 0
DIARRHEA: 0
ABDOMINAL PAIN: 0
BACK PAIN: 1
SHORTNESS OF BREATH: 1

## 2017-04-29 NOTE — PROGRESS NOTES
Rec'd report from day shift RN. Assumed pt care. Assessment completed. AA&OX4. Denies pain at this time. No s/s of discomfort or distress. Pt ambulates to the bathroom with SBA, maintains steady gait. Strong dry cough noted, vacuum suction is available at bedside. Dependent edema to BLE, 1+. Generalized bruising noted to BUE. Right IJ is patent and intact. Bed in lowest position, bed locked, slippers used to ambulate, RN and CNA numbers provided, call light within reach.

## 2017-04-29 NOTE — PROGRESS NOTES
Patient up out of bed to bathroom. Tolerates well. Bed in lowest position, call light within reach.

## 2017-04-29 NOTE — CARE PLAN
Problem: Knowledge Deficit  Goal: Knowledge of the prescribed therapeutic regimen will improve  Intervention: Discuss information regarding therpeutic regimen and document in education  Pt displayed understanding and knowledge about disease process and treatment plan      Problem: Psychosocial Needs:  Goal: Level of anxiety will decrease  Intervention: Identify and develop with patient strategies to cope with anxiety triggers  Patient was encouraged to verbalized feeling and concerns and actively participated in treatment

## 2017-04-29 NOTE — CARE PLAN
Problem: Skin Integrity  Goal: Risk for impaired skin integrity will decrease  Pt turns appropriately while in bed. Bruising noted to BUE from previous IV attempts.     Problem: Psychosocial Needs:  Goal: Level of anxiety will decrease  Addressed pt's questions and concerns, explained plan of care, pt is less anxious.

## 2017-04-29 NOTE — PROGRESS NOTES
Hospital Medicine Progress Note, Adult, Complex               Author: Lora Dewey  Date & Time created: 4/29/2017  10:21 AM     ID/CC: 65yo with multiple medical problems (COPD, CKD III, HFPLVEF, DM, HTN) found down at home altered and hypoxic.  Admitted with Dx of HCAP, sepsis       Interval History:  Pt seen and examined, afebrile, no CP, SOB improving,     Review of Systems:  Review of Systems   Constitutional: Negative for fever and chills.   Respiratory: Positive for shortness of breath (improving).    Cardiovascular: Negative for chest pain.   Gastrointestinal: Negative for nausea, vomiting, abdominal pain and diarrhea.   Musculoskeletal: Positive for back pain.   Skin: Negative for rash.   Neurological: Negative for dizziness, loss of consciousness and headaches.       Physical Exam:  Physical Exam   Constitutional: She is oriented to person, place, and time. She appears well-developed and well-nourished. No distress.   HENT:   Head: Normocephalic and atraumatic.   Eyes: Conjunctivae are normal. No scleral icterus.   Neck: Neck supple. No JVD present.   Cardiovascular: Normal rate and regular rhythm.    Murmur heard.  Pulmonary/Chest: Effort normal. No stridor. No respiratory distress. She has no wheezes. She has rales.   Abdominal: Soft. There is no tenderness. There is no rebound and no guarding.   Musculoskeletal: She exhibits edema.   Trace to 1+ edema   Neurological: She is alert and oriented to person, place, and time.   Skin: Skin is warm and dry. No rash noted. She is not diaphoretic.   Psychiatric: She has a normal mood and affect. Thought content normal.   Nursing note and vitals reviewed.      Labs:        Invalid input(s): [id number]      Recent Labs      04/28/17   0535  04/29/17   0555   SODIUM  142  139   POTASSIUM  4.0  4.3   CHLORIDE  110  108   CO2  23  24   BUN  38*  35*   CREATININE  1.30  1.38   CALCIUM  8.8  8.7     Recent Labs      04/28/17   0535  04/29/17   0555   GLUCOSE  88  111*      Recent Labs      17   0950  17   0535  17   0555   RBC  2.76*  2.67*  2.80*   HEMOGLOBIN  7.7*  7.6*  7.8*   HEMATOCRIT  24.4*  24.2*  25.5*   PLATELETCT  167  177  195     Recent Labs      17   0950  17   0535  17   0555   WBC  10.1  9.0  9.2           Hemodynamics:  Temp (24hrs), Av.3 °C (97.4 °F), Min:36.1 °C (97 °F), Max:36.6 °C (97.8 °F)  Temperature: 36.6 °C (97.8 °F)  Pulse  Av.3  Min: 58  Max: 102   Blood Pressure : 155/64 mmHg     Respiratory:    Respiration: 18, Pulse Oximetry: 97 %, O2 Daily Delivery Respiratory : Silicone Nasal Cannula        RUL Breath Sounds: Diminished, RML Breath Sounds: Diminished, RLL Breath Sounds: Diminished, BIRD Breath Sounds: Diminished, LLL Breath Sounds: Diminished  Fluids:    Intake/Output Summary (Last 24 hours) at 17 1021  Last data filed at 17 0800   Gross per 24 hour   Intake   1034 ml   Output      0 ml   Net   1034 ml     Weight: 111.6 kg (246 lb 0.5 oz)  GI/Nutrition:  Orders Placed This Encounter   Procedures   • Diet Order     Standing Status: Standing      Number of Occurrences: 1      Standing Expiration Date:      Order Specific Question:  Diet:     Answer:  Diabetic [3]     Medical Decision Making, by Problem:  Active Hospital Problems    Diagnosis   • Hypertensive heart disease with heart failure (CMS-HCC) [I11.0]  GD II diastolic dysfunction with preserved LVEF  Slowly starting her ACE and BB that she takes as outpt.    • HCAP (healthcare-associated pneumonia) [J18.9]  -cont Zosyn  -Resp culture pending    • COPD (chronic obstructive pulmonary disease) (CMS-HCC) [J44.9]  Baseline 3.5 litres at home, on 4 L here    • DM type 2, uncontrolled, with renal complications (CMS-HCC) [E11.29, E11.65]  7.9 A1c earlier this year  Cont  NPH and SSI   Pt also on steroid so  Will follow and titrate   • Iron deficiency anemia [D50.9]  Repeat Fe studies low   Start iron supplement      • HTN (hypertension)  [I10]  restarting her coreg and lisinopril    • Sepsis (CMS-HCC) [A41.9]  Respiratory source  Recent health care exposure vs aspiration  SLP eval  MRSA swab neg so dc'd Vanc cont zosyn  Follow respiratory cultures   • Elevated troponin [R79.89]  No significant EKG changes  Likely demand ischemia  Pt is ASx'c  Cont to follow   • EDITH (obstructive sleep apnea) [G47.33]   • Chronic kidney disease, stage 3 [N18.3]  HAN resolved with IVF's  Avoid Nephrotoxins     Dispo: Pt/OT eval   Medications reviewed, EKG reviewed, Labs reviewed and Radiology images reviewed        DVT Prophylaxis: Heparin  DVT prophylaxis - mechanical: SCDs  Ulcer prophylaxis: Yes  Antibiotics: Treating active infection/contamination beyond 24 hours perioperative coverage

## 2017-04-30 LAB
ANION GAP SERPL CALC-SCNC: 7 MMOL/L (ref 0–11.9)
BACTERIA BLD CULT: NORMAL
BACTERIA BLD CULT: NORMAL
BUN SERPL-MCNC: 33 MG/DL (ref 8–22)
CALCIUM SERPL-MCNC: 8.6 MG/DL (ref 8.5–10.5)
CHLORIDE SERPL-SCNC: 107 MMOL/L (ref 96–112)
CO2 SERPL-SCNC: 26 MMOL/L (ref 20–33)
CREAT SERPL-MCNC: 1.51 MG/DL (ref 0.5–1.4)
ERYTHROCYTE [DISTWIDTH] IN BLOOD BY AUTOMATED COUNT: 59.7 FL (ref 35.9–50)
GFR SERPL CREATININE-BSD FRML MDRD: 34 ML/MIN/1.73 M 2
GLUCOSE BLD-MCNC: 123 MG/DL (ref 65–99)
GLUCOSE BLD-MCNC: 138 MG/DL (ref 65–99)
GLUCOSE BLD-MCNC: 145 MG/DL (ref 65–99)
GLUCOSE BLD-MCNC: 98 MG/DL (ref 65–99)
GLUCOSE SERPL-MCNC: 179 MG/DL (ref 65–99)
HCT VFR BLD AUTO: 24.3 % (ref 37–47)
HGB BLD-MCNC: 7.5 G/DL (ref 12–16)
MCH RBC QN AUTO: 28 PG (ref 27–33)
MCHC RBC AUTO-ENTMCNC: 30.9 G/DL (ref 33.6–35)
MCV RBC AUTO: 90.7 FL (ref 81.4–97.8)
PLATELET # BLD AUTO: 224 K/UL (ref 164–446)
PMV BLD AUTO: 9.6 FL (ref 9–12.9)
POTASSIUM SERPL-SCNC: 4.4 MMOL/L (ref 3.6–5.5)
RBC # BLD AUTO: 2.68 M/UL (ref 4.2–5.4)
SIGNIFICANT IND 70042: NORMAL
SIGNIFICANT IND 70042: NORMAL
SITE SITE: NORMAL
SITE SITE: NORMAL
SODIUM SERPL-SCNC: 140 MMOL/L (ref 135–145)
SOURCE SOURCE: NORMAL
SOURCE SOURCE: NORMAL
WBC # BLD AUTO: 10.9 K/UL (ref 4.8–10.8)

## 2017-04-30 PROCEDURE — 700102 HCHG RX REV CODE 250 W/ 637 OVERRIDE(OP): Performed by: HOSPITALIST

## 2017-04-30 PROCEDURE — 700102 HCHG RX REV CODE 250 W/ 637 OVERRIDE(OP): Performed by: INTERNAL MEDICINE

## 2017-04-30 PROCEDURE — 700105 HCHG RX REV CODE 258: Performed by: HOSPITALIST

## 2017-04-30 PROCEDURE — A9270 NON-COVERED ITEM OR SERVICE: HCPCS | Performed by: HOSPITALIST

## 2017-04-30 PROCEDURE — 99232 SBSQ HOSP IP/OBS MODERATE 35: CPT | Performed by: INTERNAL MEDICINE

## 2017-04-30 PROCEDURE — 85027 COMPLETE CBC AUTOMATED: CPT

## 2017-04-30 PROCEDURE — 770006 HCHG ROOM/CARE - MED/SURG/GYN SEMI*

## 2017-04-30 PROCEDURE — A9270 NON-COVERED ITEM OR SERVICE: HCPCS | Performed by: INTERNAL MEDICINE

## 2017-04-30 PROCEDURE — 80048 BASIC METABOLIC PNL TOTAL CA: CPT

## 2017-04-30 PROCEDURE — 700111 HCHG RX REV CODE 636 W/ 250 OVERRIDE (IP): Performed by: HOSPITALIST

## 2017-04-30 PROCEDURE — 82962 GLUCOSE BLOOD TEST: CPT

## 2017-04-30 RX ADMIN — THERA TABS 1 TABLET: TAB at 09:00

## 2017-04-30 RX ADMIN — OXYCODONE HYDROCHLORIDE 20 MG: 10 TABLET ORAL at 20:07

## 2017-04-30 RX ADMIN — HYDROCORTISONE 20 MG: 20 TABLET ORAL at 09:02

## 2017-04-30 RX ADMIN — FERROUS SULFATE TAB 325 MG (65 MG ELEMENTAL FE) 325 MG: 325 (65 FE) TAB at 17:51

## 2017-04-30 RX ADMIN — HYDROCORTISONE 20 MG: 20 TABLET ORAL at 20:12

## 2017-04-30 RX ADMIN — INSULIN HUMAN 15 UNITS: 100 INJECTION, SUSPENSION SUBCUTANEOUS at 17:55

## 2017-04-30 RX ADMIN — OXYCODONE HYDROCHLORIDE 20 MG: 10 TABLET ORAL at 12:14

## 2017-04-30 RX ADMIN — MONTELUKAST SODIUM 10 MG: 10 TABLET, FILM COATED ORAL at 09:00

## 2017-04-30 RX ADMIN — CARVEDILOL 3.12 MG: 3.12 TABLET, FILM COATED ORAL at 09:00

## 2017-04-30 RX ADMIN — CARVEDILOL 3.12 MG: 3.12 TABLET, FILM COATED ORAL at 17:51

## 2017-04-30 RX ADMIN — LEVOTHYROXINE SODIUM 75 MCG: 75 TABLET ORAL at 05:29

## 2017-04-30 RX ADMIN — GABAPENTIN 600 MG: 300 CAPSULE ORAL at 09:00

## 2017-04-30 RX ADMIN — FERROUS SULFATE TAB 325 MG (65 MG ELEMENTAL FE) 325 MG: 325 (65 FE) TAB at 05:31

## 2017-04-30 RX ADMIN — PIPERACILLIN SODIUM AND TAZOBACTAM SODIUM 3.38 G: 3; .375 INJECTION, POWDER, FOR SOLUTION INTRAVENOUS at 00:09

## 2017-04-30 RX ADMIN — PIPERACILLIN SODIUM AND TAZOBACTAM SODIUM 3.38 G: 3; .375 INJECTION, POWDER, FOR SOLUTION INTRAVENOUS at 05:47

## 2017-04-30 RX ADMIN — TRAZODONE HYDROCHLORIDE 75 MG: 50 TABLET ORAL at 20:12

## 2017-04-30 RX ADMIN — FAMOTIDINE 20 MG: 20 TABLET, FILM COATED ORAL at 09:00

## 2017-04-30 RX ADMIN — BUDESONIDE AND FORMOTEROL FUMARATE DIHYDRATE 2 PUFF: 160; 4.5 AEROSOL RESPIRATORY (INHALATION) at 20:13

## 2017-04-30 RX ADMIN — LISINOPRIL 20 MG: 20 TABLET ORAL at 09:00

## 2017-04-30 RX ADMIN — GABAPENTIN 600 MG: 300 CAPSULE ORAL at 20:11

## 2017-04-30 RX ADMIN — PIPERACILLIN SODIUM AND TAZOBACTAM SODIUM 3.38 G: 3; .375 INJECTION, POWDER, FOR SOLUTION INTRAVENOUS at 20:12

## 2017-04-30 RX ADMIN — SIMVASTATIN 10 MG: 20 TABLET, FILM COATED ORAL at 20:11

## 2017-04-30 RX ADMIN — BUDESONIDE AND FORMOTEROL FUMARATE DIHYDRATE 2 PUFF: 160; 4.5 AEROSOL RESPIRATORY (INHALATION) at 09:00

## 2017-04-30 RX ADMIN — INSULIN HUMAN 15 UNITS: 100 INJECTION, SUSPENSION SUBCUTANEOUS at 05:42

## 2017-04-30 RX ADMIN — GABAPENTIN 600 MG: 300 CAPSULE ORAL at 15:53

## 2017-04-30 RX ADMIN — DULOXETINE HYDROCHLORIDE 60 MG: 60 CAPSULE, DELAYED RELEASE ORAL at 09:00

## 2017-04-30 RX ADMIN — STANDARDIZED SENNA CONCENTRATE AND DOCUSATE SODIUM 1 TABLET: 8.6; 5 TABLET, FILM COATED ORAL at 09:00

## 2017-04-30 RX ADMIN — PIPERACILLIN SODIUM AND TAZOBACTAM SODIUM 3.38 G: 3; .375 INJECTION, POWDER, FOR SOLUTION INTRAVENOUS at 12:17

## 2017-04-30 ASSESSMENT — ENCOUNTER SYMPTOMS
DIZZINESS: 0
VOMITING: 0
SHORTNESS OF BREATH: 1
CHILLS: 0
FEVER: 0
ABDOMINAL PAIN: 0
HEADACHES: 0
BACK PAIN: 1
DIARRHEA: 0
NAUSEA: 0
LOSS OF CONSCIOUSNESS: 0

## 2017-04-30 ASSESSMENT — PAIN SCALES - GENERAL
PAINLEVEL_OUTOF10: 3
PAINLEVEL_OUTOF10: 3
PAINLEVEL_OUTOF10: 6
PAINLEVEL_OUTOF10: 7
PAINLEVEL_OUTOF10: 3
PAINLEVEL_OUTOF10: 0
PAINLEVEL_OUTOF10: 3
PAINLEVEL_OUTOF10: 2
PAINLEVEL_OUTOF10: 2

## 2017-04-30 NOTE — PROGRESS NOTES
Pt is A&Ox4.  VSS.  C/O pain back generalized.  Will give scheduled pain meds per orders.  SBA.  Calls appropriately.  States N/T in her hands and feet at this time but this is not new.  Pt updated on POC, needs met and questions answered.  Call light within reach and working properly.

## 2017-04-30 NOTE — PROGRESS NOTES
Hospital Medicine Progress Note, Adult, Complex               Author: Lora Dewey  Date & Time created: 4/30/2017  10:44 AM     ID/CC: 65yo with multiple medical problems (COPD, CKD III, HFPLVEF, DM, HTN) found down at home altered and hypoxic.  Admitted with Dx of HCAP, sepsis       Interval History:  No overnight events, afebrile, SOB  Improving, denies CP.     Review of Systems:  Review of Systems   Constitutional: Negative for fever and chills.   Respiratory: Positive for shortness of breath (improving).    Cardiovascular: Negative for chest pain.   Gastrointestinal: Negative for nausea, vomiting, abdominal pain and diarrhea.   Musculoskeletal: Positive for back pain.   Skin: Negative for rash.   Neurological: Negative for dizziness, loss of consciousness and headaches.       Physical Exam:  Physical Exam   Constitutional: She is oriented to person, place, and time. She appears well-developed and well-nourished. No distress.   HENT:   Head: Normocephalic and atraumatic.   Eyes: Conjunctivae are normal. No scleral icterus.   Neck: Neck supple. No JVD present.   Cardiovascular: Normal rate and regular rhythm.    Murmur heard.  Pulmonary/Chest: Effort normal. No stridor. No respiratory distress. She has no wheezes. She has rales.   Abdominal: Soft. There is no tenderness. There is no rebound and no guarding.   Musculoskeletal: She exhibits edema.   Trace to 1+ edema   Neurological: She is alert and oriented to person, place, and time.   Skin: Skin is warm and dry. No rash noted. She is not diaphoretic.   Psychiatric: She has a normal mood and affect. Thought content normal.   Nursing note and vitals reviewed.      Labs:        Invalid input(s): [id number]      Recent Labs      04/28/17   0535  04/29/17   0555  04/30/17   0100   SODIUM  142  139  140   POTASSIUM  4.0  4.3  4.4   CHLORIDE  110  108  107   CO2  23  24  26   BUN  38*  35*  33*   CREATININE  1.30  1.38  1.51*   CALCIUM  8.8  8.7  8.6     Recent Labs       17   0535  17   0555  17   0100   GLUCOSE  88  111*  179*     Recent Labs      17   0535  17   0555  17   0100   RBC  2.67*  2.80*  2.68*   HEMOGLOBIN  7.6*  7.8*  7.5*   HEMATOCRIT  24.2*  25.5*  24.3*   PLATELETCT  177  195  224     Recent Labs      17   0535  17   0555  17   0100   WBC  9.0  9.2  10.9*           Hemodynamics:  Temp (24hrs), Av.4 °C (97.5 °F), Min:36.1 °C (97 °F), Max:36.6 °C (97.8 °F)  Temperature: 36.6 °C (97.8 °F)  Pulse  Av.9  Min: 56  Max: 102   Blood Pressure : 158/50 mmHg     Respiratory:    Respiration: 18, Pulse Oximetry: 96 %        RUL Breath Sounds: Diminished, RML Breath Sounds: Diminished, RLL Breath Sounds: Diminished, BIRD Breath Sounds: Diminished, LLL Breath Sounds: Diminished  Fluids:    Intake/Output Summary (Last 24 hours) at 17 1044  Last data filed at 17 0854   Gross per 24 hour   Intake    576 ml   Output      0 ml   Net    576 ml        GI/Nutrition:  Orders Placed This Encounter   Procedures   • Diet Order     Standing Status: Standing      Number of Occurrences: 1      Standing Expiration Date:      Order Specific Question:  Diet:     Answer:  Diabetic [3]     Medical Decision Making, by Problem:  Active Hospital Problems    Diagnosis   • Hypertensive heart disease with heart failure (CMS-HCC) [I11.0]  GD II diastolic dysfunction with preserved LVEF  Slowly starting her ACE and BB that she takes as outpt.    • HCAP (healthcare-associated pneumonia) [J18.9]  -cont Zosyn  -Resp culture growing klebsiella    • COPD (chronic obstructive pulmonary disease) (CMS-HCC) [J44.9]  -rt protocol, inhalers    • DM type 2, uncontrolled, with renal complications (CMS-HCC) [E11.29, E11.65]  7.9 A1c earlier this year  Cont  NPH and SSI   Pt also on steroid so  Will follow and titrate   • Iron deficiency anemia [D50.9]  Repeat Fe studies low   Start iron supplement    • HTN (hypertension) [I10]  restarting her  coreg and lisinopril    • Sepsis (CMS-HCC) [A41.9]  MRSA swab neg so dc'd Vanc cont zosyn  Respiratory cultures growing klebsiella  -improved    • Elevated troponin [R79.89]  No significant EKG changes  Likely demand ischemia  Pt is ASx'c   • EDITH (obstructive sleep apnea) [G47.33]   • Chronic kidney disease, stage 3 [N18.3]  HAN resolved with IVF's  Avoid Nephrotoxins     Dispo: PT/OT eval   Medications reviewed, EKG reviewed, Labs reviewed and Radiology images reviewed        DVT Prophylaxis: Heparin  DVT prophylaxis - mechanical: SCDs  Ulcer prophylaxis: Yes  Antibiotics: Treating active infection/contamination beyond 24 hours perioperative coverage

## 2017-05-01 LAB
ABO GROUP BLD: NORMAL
ANION GAP SERPL CALC-SCNC: 5 MMOL/L (ref 0–11.9)
BARCODED ABORH UBTYP: 9500
BARCODED PRD CODE UBPRD: NORMAL
BARCODED UNIT NUM UBUNT: NORMAL
BLD GP AB SCN SERPL QL: NORMAL
BUN SERPL-MCNC: 28 MG/DL (ref 8–22)
CALCIUM SERPL-MCNC: 8.5 MG/DL (ref 8.5–10.5)
CHLORIDE SERPL-SCNC: 111 MMOL/L (ref 96–112)
CO2 SERPL-SCNC: 28 MMOL/L (ref 20–33)
COMPONENT R 8504R: NORMAL
CREAT SERPL-MCNC: 1.34 MG/DL (ref 0.5–1.4)
ERYTHROCYTE [DISTWIDTH] IN BLOOD BY AUTOMATED COUNT: 58.2 FL (ref 35.9–50)
GFR SERPL CREATININE-BSD FRML MDRD: 39 ML/MIN/1.73 M 2
GLUCOSE BLD-MCNC: 118 MG/DL (ref 65–99)
GLUCOSE BLD-MCNC: 66 MG/DL (ref 65–99)
GLUCOSE BLD-MCNC: 77 MG/DL (ref 65–99)
GLUCOSE BLD-MCNC: 99 MG/DL (ref 65–99)
GLUCOSE SERPL-MCNC: 87 MG/DL (ref 65–99)
HCT VFR BLD AUTO: 22.1 % (ref 37–47)
HEMOCCULT STL QL: NEGATIVE
HGB BLD-MCNC: 6.7 G/DL (ref 12–16)
MCH RBC QN AUTO: 27.6 PG (ref 27–33)
MCHC RBC AUTO-ENTMCNC: 30.3 G/DL (ref 33.6–35)
MCV RBC AUTO: 90.9 FL (ref 81.4–97.8)
PLATELET # BLD AUTO: 191 K/UL (ref 164–446)
PMV BLD AUTO: 9.4 FL (ref 9–12.9)
POTASSIUM SERPL-SCNC: 4.4 MMOL/L (ref 3.6–5.5)
PRODUCT TYPE UPROD: NORMAL
RBC # BLD AUTO: 2.43 M/UL (ref 4.2–5.4)
RH BLD: NORMAL
SODIUM SERPL-SCNC: 144 MMOL/L (ref 135–145)
UNIT STATUS USTAT: NORMAL
WBC # BLD AUTO: 8.9 K/UL (ref 4.8–10.8)

## 2017-05-01 PROCEDURE — G8978 MOBILITY CURRENT STATUS: HCPCS | Mod: CI

## 2017-05-01 PROCEDURE — A9270 NON-COVERED ITEM OR SERVICE: HCPCS | Performed by: HOSPITALIST

## 2017-05-01 PROCEDURE — 85027 COMPLETE CBC AUTOMATED: CPT

## 2017-05-01 PROCEDURE — 80048 BASIC METABOLIC PNL TOTAL CA: CPT

## 2017-05-01 PROCEDURE — 700102 HCHG RX REV CODE 250 W/ 637 OVERRIDE(OP): Performed by: HOSPITALIST

## 2017-05-01 PROCEDURE — P9016 RBC LEUKOCYTES REDUCED: HCPCS

## 2017-05-01 PROCEDURE — 770006 HCHG ROOM/CARE - MED/SURG/GYN SEMI*

## 2017-05-01 PROCEDURE — 82962 GLUCOSE BLOOD TEST: CPT | Mod: 91

## 2017-05-01 PROCEDURE — 86850 RBC ANTIBODY SCREEN: CPT

## 2017-05-01 PROCEDURE — 86900 BLOOD TYPING SEROLOGIC ABO: CPT

## 2017-05-01 PROCEDURE — A9270 NON-COVERED ITEM OR SERVICE: HCPCS | Performed by: INTERNAL MEDICINE

## 2017-05-01 PROCEDURE — 99232 SBSQ HOSP IP/OBS MODERATE 35: CPT | Performed by: INTERNAL MEDICINE

## 2017-05-01 PROCEDURE — G8980 MOBILITY D/C STATUS: HCPCS | Mod: CI

## 2017-05-01 PROCEDURE — 86923 COMPATIBILITY TEST ELECTRIC: CPT

## 2017-05-01 PROCEDURE — 700102 HCHG RX REV CODE 250 W/ 637 OVERRIDE(OP): Performed by: INTERNAL MEDICINE

## 2017-05-01 PROCEDURE — 86901 BLOOD TYPING SEROLOGIC RH(D): CPT

## 2017-05-01 PROCEDURE — G8979 MOBILITY GOAL STATUS: HCPCS | Mod: CI

## 2017-05-01 PROCEDURE — 700111 HCHG RX REV CODE 636 W/ 250 OVERRIDE (IP): Performed by: HOSPITALIST

## 2017-05-01 PROCEDURE — 97162 PT EVAL MOD COMPLEX 30 MIN: CPT

## 2017-05-01 PROCEDURE — 82272 OCCULT BLD FECES 1-3 TESTS: CPT

## 2017-05-01 PROCEDURE — 700105 HCHG RX REV CODE 258: Performed by: HOSPITALIST

## 2017-05-01 PROCEDURE — 36430 TRANSFUSION BLD/BLD COMPNT: CPT

## 2017-05-01 RX ORDER — SULFAMETHOXAZOLE AND TRIMETHOPRIM 800; 160 MG/1; MG/1
1 TABLET ORAL EVERY 12 HOURS
Status: DISCONTINUED | OUTPATIENT
Start: 2017-05-01 | End: 2017-05-04 | Stop reason: HOSPADM

## 2017-05-01 RX ADMIN — TRAZODONE HYDROCHLORIDE 75 MG: 50 TABLET ORAL at 20:42

## 2017-05-01 RX ADMIN — INSULIN HUMAN 15 UNITS: 100 INJECTION, SUSPENSION SUBCUTANEOUS at 08:27

## 2017-05-01 RX ADMIN — PIPERACILLIN SODIUM AND TAZOBACTAM SODIUM 3.38 G: 3; .375 INJECTION, POWDER, FOR SOLUTION INTRAVENOUS at 05:47

## 2017-05-01 RX ADMIN — SIMVASTATIN 10 MG: 20 TABLET, FILM COATED ORAL at 20:41

## 2017-05-01 RX ADMIN — HYDROCORTISONE 20 MG: 20 TABLET ORAL at 08:18

## 2017-05-01 RX ADMIN — FAMOTIDINE 20 MG: 20 TABLET, FILM COATED ORAL at 08:18

## 2017-05-01 RX ADMIN — LISINOPRIL 20 MG: 20 TABLET ORAL at 08:17

## 2017-05-01 RX ADMIN — SULFAMETHOXAZOLE AND TRIMETHOPRIM 1 TABLET: 800; 160 TABLET ORAL at 20:42

## 2017-05-01 RX ADMIN — HYDROCORTISONE 20 MG: 20 TABLET ORAL at 20:44

## 2017-05-01 RX ADMIN — THERA TABS 1 TABLET: TAB at 08:18

## 2017-05-01 RX ADMIN — MONTELUKAST SODIUM 10 MG: 10 TABLET, FILM COATED ORAL at 08:18

## 2017-05-01 RX ADMIN — OXYCODONE HYDROCHLORIDE 20 MG: 10 TABLET ORAL at 17:34

## 2017-05-01 RX ADMIN — DULOXETINE HYDROCHLORIDE 60 MG: 60 CAPSULE, DELAYED RELEASE ORAL at 08:17

## 2017-05-01 RX ADMIN — SULFAMETHOXAZOLE AND TRIMETHOPRIM 1 TABLET: 800; 160 TABLET ORAL at 11:32

## 2017-05-01 RX ADMIN — CARVEDILOL 3.12 MG: 3.12 TABLET, FILM COATED ORAL at 08:18

## 2017-05-01 RX ADMIN — BUDESONIDE AND FORMOTEROL FUMARATE DIHYDRATE 2 PUFF: 160; 4.5 AEROSOL RESPIRATORY (INHALATION) at 20:43

## 2017-05-01 RX ADMIN — INSULIN HUMAN 15 UNITS: 100 INJECTION, SUSPENSION SUBCUTANEOUS at 17:26

## 2017-05-01 RX ADMIN — LEVOTHYROXINE SODIUM 75 MCG: 75 TABLET ORAL at 05:23

## 2017-05-01 RX ADMIN — FERROUS SULFATE TAB 325 MG (65 MG ELEMENTAL FE) 325 MG: 325 (65 FE) TAB at 08:18

## 2017-05-01 RX ADMIN — CARVEDILOL 3.12 MG: 3.12 TABLET, FILM COATED ORAL at 17:15

## 2017-05-01 RX ADMIN — FERROUS SULFATE TAB 325 MG (65 MG ELEMENTAL FE) 325 MG: 325 (65 FE) TAB at 17:15

## 2017-05-01 RX ADMIN — PIPERACILLIN SODIUM AND TAZOBACTAM SODIUM 3.38 G: 3; .375 INJECTION, POWDER, FOR SOLUTION INTRAVENOUS at 02:28

## 2017-05-01 RX ADMIN — OXYCODONE HYDROCHLORIDE 20 MG: 10 TABLET ORAL at 23:57

## 2017-05-01 RX ADMIN — BUDESONIDE AND FORMOTEROL FUMARATE DIHYDRATE 2 PUFF: 160; 4.5 AEROSOL RESPIRATORY (INHALATION) at 08:17

## 2017-05-01 RX ADMIN — TIOTROPIUM BROMIDE 1 CAPSULE: 18 CAPSULE ORAL; RESPIRATORY (INHALATION) at 08:19

## 2017-05-01 RX ADMIN — OXYCODONE HYDROCHLORIDE 20 MG: 10 TABLET ORAL at 11:32

## 2017-05-01 RX ADMIN — GABAPENTIN 600 MG: 300 CAPSULE ORAL at 15:02

## 2017-05-01 RX ADMIN — GABAPENTIN 600 MG: 300 CAPSULE ORAL at 20:41

## 2017-05-01 RX ADMIN — GABAPENTIN 600 MG: 300 CAPSULE ORAL at 08:18

## 2017-05-01 ASSESSMENT — PAIN SCALES - GENERAL
PAINLEVEL_OUTOF10: 6
PAINLEVEL_OUTOF10: 6
PAINLEVEL_OUTOF10: 3
PAINLEVEL_OUTOF10: 5
PAINLEVEL_OUTOF10: 6
PAINLEVEL_OUTOF10: 0
PAINLEVEL_OUTOF10: 3
PAINLEVEL_OUTOF10: 3
PAINLEVEL_OUTOF10: 0

## 2017-05-01 ASSESSMENT — ENCOUNTER SYMPTOMS
FEVER: 0
DIARRHEA: 0
LOSS OF CONSCIOUSNESS: 0
ABDOMINAL PAIN: 0
SHORTNESS OF BREATH: 1
NAUSEA: 0
CHILLS: 0
VOMITING: 0
DIZZINESS: 0
HEADACHES: 0
BACK PAIN: 1

## 2017-05-01 ASSESSMENT — GAIT ASSESSMENTS
DEVIATION: OTHER (COMMENT)
DISTANCE (FEET): 50
GAIT LEVEL OF ASSIST: STAND BY ASSIST

## 2017-05-01 NOTE — PROGRESS NOTES
Pt is A&Ox4.  VSS.  C/O pain, back.  Will give pain meds per orders.  Up self  Calls appropriately. No medical changes from previous assessment noted at this time.  Pt updated on POC, needs met and questions answered.    Call light within reach and working properly.

## 2017-05-01 NOTE — THERAPY
"Physical Therapy Evaluation completed.   Bed Mobility:  Supine to Sit: Stand by Assist  Transfers: Sit to Stand: Supervised  Gait: Level Of Assist: Stand by Assist with No Equipment Needed; see below       Plan of Care: Patient with no further skilled PT needs in the acute care setting at this time  Discharge Recommendations: Equipment: No Equipment Needed; pt has all appropriate AD at home; see below    Pt presents to PT for risk reduction for LOB and falling as well as impaired endurance and aerobic capacity associated with recent medical co-morbidities. Pt was able to demonstrate short distance ambulation with no AD with 1 slight LOB with perturbation which pt self corrects with stepping strategy. Anticipate that pt is at higher risk for falls in unfamiliar environments or uneven surfaces given baseline neuropathy and poor sensation at BLE's. However, anticiapte this is her baseline prior to admit with regards to function, though she is requiring increased supplemental 02 at this time via NC (see below*). Noted pt is up self in room per nsg and has been ambulating in hallways prior to PT visit with no AD. Would highly recommend continued skilled PT after medical d/c to home for formal home assessment, higher level balance training, and progression of aerobic capacity as able. Pt reports no concerns with functional ability to d/c to home once Sp02 improves and edema in LE's returns to baseline. Noted pt had home health prior to admit per pt report.     * - on 5L NC during ambulation, Sp02 dec to 83% -> recovered and able to maintain ~88<>89% on 8L during ambulation; at rest on 4L at ~94%    See \"Rehab Therapy-Acute\" Patient Summary Report for complete documentation.     "

## 2017-05-01 NOTE — PROGRESS NOTES
Hospital Medicine Progress Note, Adult, Complex               Author: Lora Dewey  Date & Time created: 5/1/2017  10:30 AM     ID/CC: 67yo with multiple medical problems (COPD, CKD III, HFPLVEF, DM, HTN) found down at home altered and hypoxic.  Admitted with Dx of HCAP, sepsis       Interval History:  Pt seen and examined, afebrile, denies any CP SOB has improved.  Anemia due to AOCD, hemoglobin dropped got 1 unit of RBC, will also check a FOBT   Review of Systems:  Review of Systems   Constitutional: Negative for fever and chills.   Respiratory: Positive for shortness of breath (improving).    Cardiovascular: Negative for chest pain.   Gastrointestinal: Negative for nausea, vomiting, abdominal pain and diarrhea.   Musculoskeletal: Positive for back pain.   Skin: Negative for rash.   Neurological: Negative for dizziness, loss of consciousness and headaches.       Physical Exam:  Physical Exam   Constitutional: She is oriented to person, place, and time. She appears well-developed and well-nourished. No distress.   HENT:   Head: Normocephalic and atraumatic.   Eyes: Conjunctivae are normal. No scleral icterus.   Neck: Neck supple. No JVD present.   Cardiovascular: Normal rate and regular rhythm.    Murmur heard.  Pulmonary/Chest: Effort normal. No stridor. No respiratory distress. She has no wheezes. She has rales.   Abdominal: Soft. There is no tenderness. There is no rebound and no guarding.   Musculoskeletal: She exhibits edema.   Trace to 1+ edema   Neurological: She is alert and oriented to person, place, and time.   Skin: Skin is warm and dry. No rash noted. She is not diaphoretic.   Psychiatric: She has a normal mood and affect. Thought content normal.   Nursing note and vitals reviewed.      Labs:        Invalid input(s): [id number]      Recent Labs      04/29/17   0555  04/30/17   0100  05/01/17   0308   SODIUM  139  140  144   POTASSIUM  4.3  4.4  4.4   CHLORIDE  108  107  111   CO2  24  26  28   BUN  35*   33*  28*   CREATININE  1.38  1.51*  1.34   CALCIUM  8.7  8.6  8.5     Recent Labs      17   0555  17   0100  17   0308   GLUCOSE  111*  179*  87     Recent Labs      17   0555  17   0100  17   0308   RBC  2.80*  2.68*  2.43*   HEMOGLOBIN  7.8*  7.5*  6.7*   HEMATOCRIT  25.5*  24.3*  22.1*   PLATELETCT  195  224  191     Recent Labs      17   0555  170  17   0308   WBC  9.2  10.9*  8.9           Hemodynamics:  Temp (24hrs), Av.3 °C (97.4 °F), Min:36 °C (96.8 °F), Max:36.6 °C (97.9 °F)  Temperature: 36.4 °C (97.5 °F)  Pulse  Av.1  Min: 56  Max: 102   Blood Pressure : (!) 165/74 mmHg     Respiratory:    Respiration: 20, Pulse Oximetry: 96 %        RUL Breath Sounds: Diminished, RML Breath Sounds: Diminished, RLL Breath Sounds: Diminished, BIRD Breath Sounds: Diminished, LLL Breath Sounds: Diminished  Fluids:    Intake/Output Summary (Last 24 hours) at 17 1030  Last data filed at 17 0754   Gross per 24 hour   Intake   1285 ml   Output      0 ml   Net   1285 ml        GI/Nutrition:  Orders Placed This Encounter   Procedures   • Diet Order     Standing Status: Standing      Number of Occurrences: 1      Standing Expiration Date:      Order Specific Question:  Diet:     Answer:  Diabetic [3]     Medical Decision Making, by Problem:  Active Hospital Problems    Diagnosis   • Hypertensive heart disease with heart failure (CMS-HCC) [I11.0]  GD II diastolic dysfunction with preserved LVEF  Slowly starting her ACE and BB that she takes as outpt.    • HCAP (healthcare-associated pneumonia) [J18.9]  -switch to oral bactrim   -Resp culture growing klebsiella    • COPD (chronic obstructive pulmonary disease) (CMS-HCC) [J44.9]  -rt protocol, inhalers    • DM type 2, uncontrolled, with renal complications (CMS-HCC) [E11.29, E11.65]  7.9 A1c earlier this year  Cont  NPH and SSI    • Anemia of chronic disease   hemoglobin dropped,transfused 1 unit, will  also check a FOBT    Cont iron supplement    • HTN (hypertension) [I10]  restarting her coreg and lisinopril    • Sepsis (CMS-HCC) [A41.9]  MRSA swab neg so dc'd Vanc cont zosyn  Respiratory cultures growing klebsiella  -improved    • Elevated troponin [R79.89]  No significant EKG changes  Likely demand ischemia  Pt is ASx'c   • EDITH (obstructive sleep apnea) [G47.33]   • Chronic kidney disease, stage 3 [N18.3]  HAN resolved with IVF's  Avoid Nephrotoxins     Dispo: PT/OT eval   Medications reviewed, EKG reviewed, Labs reviewed and Radiology images reviewed        DVT Prophylaxis: Heparin  DVT prophylaxis - mechanical: SCDs  Ulcer prophylaxis: Yes  Antibiotics: Treating active infection/contamination beyond 24 hours perioperative coverage

## 2017-05-01 NOTE — PROGRESS NOTES
Assumed care, assessment complete. Pt is A & O x 4. Pt medicated for pain per MAR. Fall precautions and appropriate signs in place. Pt oriented to unit routine, call light/phone system and RN extension number provided. Pt educated regarding fall precautions. Bed alarm not in use. Pt up self. Pt denies any additional needs at this time. Call light within reach. Pt received 1 unit RBC. VSS.

## 2017-05-02 LAB
ANISOCYTOSIS BLD QL SMEAR: ABNORMAL
BASOPHILS # BLD AUTO: 0 % (ref 0–1.8)
BASOPHILS # BLD: 0 K/UL (ref 0–0.12)
DACRYOCYTES BLD QL SMEAR: NORMAL
EOSINOPHIL # BLD AUTO: 0.22 K/UL (ref 0–0.51)
EOSINOPHIL NFR BLD: 2.6 % (ref 0–6.9)
ERYTHROCYTE [DISTWIDTH] IN BLOOD BY AUTOMATED COUNT: 58.7 FL (ref 35.9–50)
GLUCOSE BLD-MCNC: 124 MG/DL (ref 65–99)
GLUCOSE BLD-MCNC: 124 MG/DL (ref 65–99)
GLUCOSE BLD-MCNC: 135 MG/DL (ref 65–99)
GLUCOSE BLD-MCNC: 73 MG/DL (ref 65–99)
HCT VFR BLD AUTO: 25.8 % (ref 37–47)
HGB BLD-MCNC: 7.8 G/DL (ref 12–16)
LYMPHOCYTES # BLD AUTO: 1.86 K/UL (ref 1–4.8)
LYMPHOCYTES NFR BLD: 21.9 % (ref 22–41)
MANUAL DIFF BLD: NORMAL
MCH RBC QN AUTO: 27.8 PG (ref 27–33)
MCHC RBC AUTO-ENTMCNC: 30.2 G/DL (ref 33.6–35)
MCV RBC AUTO: 91.8 FL (ref 81.4–97.8)
MICROCYTES BLD QL SMEAR: ABNORMAL
MONOCYTES # BLD AUTO: 0.3 K/UL (ref 0–0.85)
MONOCYTES NFR BLD AUTO: 3.5 % (ref 0–13.4)
MORPHOLOGY BLD-IMP: NORMAL
NEUTROPHILS # BLD AUTO: 6.12 K/UL (ref 2–7.15)
NEUTROPHILS NFR BLD: 71.1 % (ref 44–72)
NEUTS BAND NFR BLD MANUAL: 0.9 % (ref 0–10)
NRBC # BLD AUTO: 0.02 K/UL
NRBC BLD AUTO-RTO: 0.2 /100 WBC
PLATELET # BLD AUTO: 209 K/UL (ref 164–446)
PLATELET BLD QL SMEAR: NORMAL
PMV BLD AUTO: 8.9 FL (ref 9–12.9)
POIKILOCYTOSIS BLD QL SMEAR: NORMAL
POLYCHROMASIA BLD QL SMEAR: NORMAL
RBC # BLD AUTO: 2.81 M/UL (ref 4.2–5.4)
RBC BLD AUTO: PRESENT
WBC # BLD AUTO: 8.5 K/UL (ref 4.8–10.8)

## 2017-05-02 PROCEDURE — 700102 HCHG RX REV CODE 250 W/ 637 OVERRIDE(OP): Performed by: HOSPITALIST

## 2017-05-02 PROCEDURE — A9270 NON-COVERED ITEM OR SERVICE: HCPCS | Performed by: INTERNAL MEDICINE

## 2017-05-02 PROCEDURE — A9270 NON-COVERED ITEM OR SERVICE: HCPCS | Performed by: HOSPITALIST

## 2017-05-02 PROCEDURE — 82962 GLUCOSE BLOOD TEST: CPT

## 2017-05-02 PROCEDURE — 700102 HCHG RX REV CODE 250 W/ 637 OVERRIDE(OP): Performed by: INTERNAL MEDICINE

## 2017-05-02 PROCEDURE — 700102 HCHG RX REV CODE 250 W/ 637 OVERRIDE(OP): Performed by: NURSE PRACTITIONER

## 2017-05-02 PROCEDURE — 94640 AIRWAY INHALATION TREATMENT: CPT

## 2017-05-02 PROCEDURE — 700101 HCHG RX REV CODE 250: Performed by: INTERNAL MEDICINE

## 2017-05-02 PROCEDURE — 85007 BL SMEAR W/DIFF WBC COUNT: CPT

## 2017-05-02 PROCEDURE — 770006 HCHG ROOM/CARE - MED/SURG/GYN SEMI*

## 2017-05-02 PROCEDURE — 99233 SBSQ HOSP IP/OBS HIGH 50: CPT | Performed by: INTERNAL MEDICINE

## 2017-05-02 PROCEDURE — 85027 COMPLETE CBC AUTOMATED: CPT

## 2017-05-02 PROCEDURE — A9270 NON-COVERED ITEM OR SERVICE: HCPCS | Performed by: NURSE PRACTITIONER

## 2017-05-02 RX ORDER — AMLODIPINE BESYLATE 5 MG/1
5 TABLET ORAL
Status: DISCONTINUED | OUTPATIENT
Start: 2017-05-02 | End: 2017-05-04 | Stop reason: HOSPADM

## 2017-05-02 RX ORDER — FUROSEMIDE 20 MG/1
20 TABLET ORAL
Status: DISCONTINUED | OUTPATIENT
Start: 2017-05-02 | End: 2017-05-04 | Stop reason: HOSPADM

## 2017-05-02 RX ADMIN — OXYCODONE HYDROCHLORIDE 20 MG: 10 TABLET ORAL at 20:21

## 2017-05-02 RX ADMIN — GABAPENTIN 600 MG: 300 CAPSULE ORAL at 20:21

## 2017-05-02 RX ADMIN — SIMVASTATIN 10 MG: 20 TABLET, FILM COATED ORAL at 20:22

## 2017-05-02 RX ADMIN — INSULIN HUMAN 15 UNITS: 100 INJECTION, SUSPENSION SUBCUTANEOUS at 09:04

## 2017-05-02 RX ADMIN — FUROSEMIDE 20 MG: 20 TABLET ORAL at 14:07

## 2017-05-02 RX ADMIN — SULFAMETHOXAZOLE AND TRIMETHOPRIM 1 TABLET: 800; 160 TABLET ORAL at 20:22

## 2017-05-02 RX ADMIN — TIOTROPIUM BROMIDE 1 CAPSULE: 18 CAPSULE ORAL; RESPIRATORY (INHALATION) at 09:22

## 2017-05-02 RX ADMIN — CYCLOBENZAPRINE HYDROCHLORIDE 10 MG: 10 TABLET, FILM COATED ORAL at 20:21

## 2017-05-02 RX ADMIN — BUDESONIDE AND FORMOTEROL FUMARATE DIHYDRATE 2 PUFF: 160; 4.5 AEROSOL RESPIRATORY (INHALATION) at 09:22

## 2017-05-02 RX ADMIN — CARVEDILOL 3.12 MG: 3.12 TABLET, FILM COATED ORAL at 16:59

## 2017-05-02 RX ADMIN — FERROUS SULFATE TAB 325 MG (65 MG ELEMENTAL FE) 325 MG: 325 (65 FE) TAB at 09:21

## 2017-05-02 RX ADMIN — HYDROCORTISONE 20 MG: 20 TABLET ORAL at 09:17

## 2017-05-02 RX ADMIN — SULFAMETHOXAZOLE AND TRIMETHOPRIM 1 TABLET: 800; 160 TABLET ORAL at 09:17

## 2017-05-02 RX ADMIN — BUDESONIDE AND FORMOTEROL FUMARATE DIHYDRATE 2 PUFF: 160; 4.5 AEROSOL RESPIRATORY (INHALATION) at 20:20

## 2017-05-02 RX ADMIN — INSULIN HUMAN 15 UNITS: 100 INJECTION, SUSPENSION SUBCUTANEOUS at 16:56

## 2017-05-02 RX ADMIN — FERROUS SULFATE TAB 325 MG (65 MG ELEMENTAL FE) 325 MG: 325 (65 FE) TAB at 16:59

## 2017-05-02 RX ADMIN — LEVOTHYROXINE SODIUM 75 MCG: 75 TABLET ORAL at 05:57

## 2017-05-02 RX ADMIN — MONTELUKAST SODIUM 10 MG: 10 TABLET, FILM COATED ORAL at 09:21

## 2017-05-02 RX ADMIN — CARVEDILOL 3.12 MG: 3.12 TABLET, FILM COATED ORAL at 09:19

## 2017-05-02 RX ADMIN — TRAZODONE HYDROCHLORIDE 75 MG: 50 TABLET ORAL at 20:21

## 2017-05-02 RX ADMIN — HYDROCORTISONE 20 MG: 20 TABLET ORAL at 20:21

## 2017-05-02 RX ADMIN — THERA TABS 1 TABLET: TAB at 09:18

## 2017-05-02 RX ADMIN — FAMOTIDINE 20 MG: 20 TABLET, FILM COATED ORAL at 09:18

## 2017-05-02 RX ADMIN — AMLODIPINE BESYLATE 5 MG: 5 TABLET ORAL at 09:32

## 2017-05-02 RX ADMIN — LISINOPRIL 20 MG: 20 TABLET ORAL at 09:21

## 2017-05-02 RX ADMIN — IPRATROPIUM BROMIDE AND ALBUTEROL SULFATE 3 ML: .5; 3 SOLUTION RESPIRATORY (INHALATION) at 15:45

## 2017-05-02 RX ADMIN — GABAPENTIN 600 MG: 300 CAPSULE ORAL at 14:08

## 2017-05-02 RX ADMIN — OXYCODONE HYDROCHLORIDE 20 MG: 10 TABLET ORAL at 14:08

## 2017-05-02 RX ADMIN — GABAPENTIN 600 MG: 300 CAPSULE ORAL at 09:16

## 2017-05-02 RX ADMIN — DULOXETINE HYDROCHLORIDE 60 MG: 60 CAPSULE, DELAYED RELEASE ORAL at 09:17

## 2017-05-02 ASSESSMENT — PAIN SCALES - GENERAL
PAINLEVEL_OUTOF10: 4
PAINLEVEL_OUTOF10: 7
PAINLEVEL_OUTOF10: 4
PAINLEVEL_OUTOF10: 7
PAINLEVEL_OUTOF10: 5

## 2017-05-02 ASSESSMENT — ENCOUNTER SYMPTOMS
FEVER: 0
DIZZINESS: 0
WHEEZING: 0
CHILLS: 0
DEPRESSION: 0
DIARRHEA: 0
MEMORY LOSS: 0
COUGH: 1
HEADACHES: 0
FOCAL WEAKNESS: 0
NECK PAIN: 0
DIAPHORESIS: 0
MYALGIAS: 0
NAUSEA: 0
SHORTNESS OF BREATH: 1
SPUTUM PRODUCTION: 0
WEAKNESS: 1
ABDOMINAL PAIN: 0
BACK PAIN: 1

## 2017-05-02 NOTE — PROGRESS NOTES
Hospital Medicine Progress Note, Adult, Complex               Author: Valorie Hemphill Date & Time created: 5/2/2017  2:06 PM     ID/CC: 65yo with multiple medical problems (COPD, CKD III, HFPLVEF, DM, HTN) found down at home altered and hypoxic.  Admitted with Dx of HCAP, sepsis       Interval History:  Feels stronger  Denies blurry vision  fobt neg  + sob improving    Review of Systems:  Review of Systems   Constitutional: Negative for fever, chills, malaise/fatigue and diaphoresis.   HENT: Negative for congestion and tinnitus.    Respiratory: Positive for cough and shortness of breath (improving). Negative for sputum production and wheezing.    Cardiovascular: Negative for chest pain.   Gastrointestinal: Negative for nausea, abdominal pain and diarrhea.   Musculoskeletal: Positive for back pain. Negative for myalgias and neck pain.   Skin: Negative for rash.   Neurological: Positive for weakness. Negative for dizziness, focal weakness and headaches.   Psychiatric/Behavioral: Negative for depression and memory loss.       Physical Exam:  Physical Exam   Constitutional: She is oriented to person, place, and time. She appears well-developed. No distress.   Morbid obesity   HENT:   Head: Normocephalic and atraumatic.   Mouth/Throat: No oropharyngeal exudate.   Eyes: Conjunctivae are normal.   Neck: Normal range of motion. Neck supple. No JVD present.   Cardiovascular: Normal rate, regular rhythm and intact distal pulses.    Murmur heard.  Pulmonary/Chest: Effort normal. No respiratory distress. She has rales.   Abdominal: Soft. She exhibits no distension. There is no tenderness.   Musculoskeletal: She exhibits edema. She exhibits no tenderness.   Trace to 1+ edema   Neurological: She is alert and oriented to person, place, and time. No cranial nerve deficit. She exhibits normal muscle tone. Coordination normal.   Skin: Skin is warm and dry. No rash noted. No erythema.   Psychiatric: She has a normal mood and affect.  Thought content normal.   Nursing note and vitals reviewed.      Labs:        Invalid input(s): [id number]      Recent Labs      17   0308   SODIUM  140  144   POTASSIUM  4.4  4.4   CHLORIDE  107  111   CO2  26  28   BUN  33*  28*   CREATININE  1.51*  1.34   CALCIUM  8.6  8.5     Recent Labs      17   0308   GLUCOSE  179*  87     Recent Labs      17   0308  17   0940   RBC  2.68*  2.43*  2.81*   HEMOGLOBIN  7.5*  6.7*  7.8*   HEMATOCRIT  24.3*  22.1*  25.8*   PLATELETCT  224  191  209     Recent Labs      17   0308  17   0940   WBC  10.9*  8.9  8.5   NEUTSPOLYS   --    --   71.10   LYMPHOCYTES   --    --   21.90*   MONOCYTES   --    --   3.50   EOSINOPHILS   --    --   2.60   BASOPHILS   --    --   0.00           Hemodynamics:  Temp (24hrs), Av.3 °C (97.4 °F), Min:36.1 °C (96.9 °F), Max:36.7 °C (98.1 °F)  Temperature: 36.3 °C (97.3 °F)  Pulse  Av.8  Min: 56  Max: 102   Blood Pressure : 157/58 mmHg     Respiratory:    Respiration: 18, Pulse Oximetry: 95 %     Work Of Breathing / Effort: Mild  RUL Breath Sounds: Diminished, RML Breath Sounds: Diminished, RLL Breath Sounds: Diminished, BIRD Breath Sounds: Diminished, LLL Breath Sounds: Diminished  Fluids:  No intake or output data in the 24 hours ending 17 1406     GI/Nutrition:  Orders Placed This Encounter   Procedures   • Diet Order     Standing Status: Standing      Number of Occurrences: 1      Standing Expiration Date:      Order Specific Question:  Diet:     Answer:  Diabetic [3]     Medical Decision Making, by Problem:  Active Hospital Problems    Diagnosis   • Hypertensive heart disease with heart failure (CMS-HCC) [I11.0]  GD II diastolic dysfunction with preserved LVEF  Slowly starting her ACE and BB that she takes as outpt.   Added lasix and norvasc   • HCAP (healthcare-associated pneumonia) [J18.9]  -oral bactrim   -Resp culture growing  klebsiella    • COPD (chronic obstructive pulmonary disease) (CMS-HCC) [J44.9]with chronic hypoxia- on 3.5L at home  - taper O2, encourage ambuation  -rt protocol, inhalers    • DM type 2, uncontrolled, with renal complications (CMS-HCC) [E11.29, E11.65]  7.9 A1c earlier this year  Cont  NPH and SSI    • Anemia of chronic disease   S/p 1 u prbc 5/1, h/h stable, monitor  Neg  FOBT    Cont iron supplement    • HTN (hypertension) [I10]- uncontrolled  cont her coreg and lisinopril    • Sepsis (CMS-HCC) [A41.9]  MRSA swab neg so dc'd Vanc cont zosyn  Respiratory cultures growing klebsiella  -improved    • Elevated troponin [R79.89]  No significant EKG changes  Likely demand ischemia  Pt is ASx'c   • EDITH (obstructive sleep apnea) [G47.33]   • Chronic kidney disease, stage 3 [N18.3]with ARF- improving  - monitor  - off ivf  - f/u am bmp  Avoid Nephrotoxins     Dispo: PT/OT eval   To home with home oxygen in 1-3 days with clinical improvement  Encourage OOB/IS use  Medications reviewed, EKG reviewed, Labs reviewed and Radiology images reviewed        DVT Prophylaxis: Heparin  DVT prophylaxis - mechanical: SCDs  Ulcer prophylaxis: Yes  Antibiotics: Treating active infection/contamination beyond 24 hours perioperative coverage

## 2017-05-02 NOTE — CARE PLAN
Problem: Safety  Goal: Will remain free from falls  Pt educated to call for assistance with ambulation. Pt refused bed alarm with education, bed in lowest, locked position, call light within reach.     Problem: Pain Management  Goal: Pain level will decrease to patient’s comfort goal  Patient's pain will be assessed  and treated accordingly with pharmacological and non-pharmacological interventions. Pain will be reassessed in a timely manner and appropriate follow-up action taken. Pt will use appropriate pain scale for situation, set a comfort score for themselves at the beginning of the shift, and rate pain with each assessment.   Pt reported neuropathy pain at beginning of shift, medicated per MAR.

## 2017-05-02 NOTE — CARE PLAN
Problem: Discharge Barriers/Planning  Goal: Patient’s continuum of care needs will be met  Intervention: Assess potential discharge barriers on admission and throughout hospital stay  Pt unable to afford O/P oxygen, SW involved with setting up home arrangements.      Problem: Psychosocial Needs:  Goal: Level of anxiety will decrease  Intervention: Identify and develop with patient strategies to cope with anxiety triggers  Pt anxious, emotional support provided.

## 2017-05-02 NOTE — PROGRESS NOTES
Assumed care, assessment complete.  Report received from day shift RN. Pt A&Ox4. Pt reports mild neuropathy pain in bilat hands, medicated per MAR. Pt educated on hourly rounding and phone extension numbers. Pt board has been updated. Pt denies any additional needs at this time.  Pt refuses bed alarm with education, call light and phone within reach.

## 2017-05-02 NOTE — PROGRESS NOTES
"Assumed care of pt at 0700. Pt A&Ox4. Assessment complete. Pt reports pain in her right knee, stated \"I have had a total knee replacement.\" Pt refused Lovenox shot this AM and also refused stool softener.Pt denies any other needs at this time. Pt refuses bed alarm. Call light within reach. Fall precautions in place, pt educated on fall precautions. Pt resting comfortably at this time.   "

## 2017-05-02 NOTE — DISCHARGE PLANNING
Spoke with patient re: her situation with her home oxygen provider, [employer] and the outstanding balance she owes which is approx.1300. The Medical Financial Hardship forms from [facility] have begun to be filled out by patient however, requested along with the form are copies of some of her financial records and statements. Patient states she believes the only thing keeping her in the hospital is her inability to acquire portable oxygen tanks from [employer] in future. She states she has a concentrator at home in addition to three pottable tanks. Patient states she will need to home where she can locate the paperwork being requested to complete the forms.   Patient's monthly income from social security is aprox. 650.00 and her rent is approx. 460.00.  I am awaiting a call  Back from [facility]  to discuss possibility of a CHRISTEN between [facility] and [facility] so that patient may obtain enough portable tanks to meet her needs until her financial hardship application goes through.

## 2017-05-03 ENCOUNTER — APPOINTMENT (OUTPATIENT)
Dept: RADIOLOGY | Facility: MEDICAL CENTER | Age: 67
DRG: 871 | End: 2017-05-03
Attending: INTERNAL MEDICINE
Payer: MEDICARE

## 2017-05-03 LAB
ALBUMIN SERPL BCP-MCNC: 3.2 G/DL (ref 3.2–4.9)
ALBUMIN/GLOB SERPL: 1.2 G/DL
ALP SERPL-CCNC: 42 U/L (ref 30–99)
ALT SERPL-CCNC: 14 U/L (ref 2–50)
ANION GAP SERPL CALC-SCNC: 6 MMOL/L (ref 0–11.9)
AST SERPL-CCNC: 14 U/L (ref 12–45)
BASOPHILS # BLD AUTO: 0.4 % (ref 0–1.8)
BASOPHILS # BLD: 0.04 K/UL (ref 0–0.12)
BILIRUB SERPL-MCNC: 0.5 MG/DL (ref 0.1–1.5)
BUN SERPL-MCNC: 26 MG/DL (ref 8–22)
CALCIUM SERPL-MCNC: 9.2 MG/DL (ref 8.5–10.5)
CHLORIDE SERPL-SCNC: 104 MMOL/L (ref 96–112)
CO2 SERPL-SCNC: 28 MMOL/L (ref 20–33)
CREAT SERPL-MCNC: 1.43 MG/DL (ref 0.5–1.4)
EOSINOPHIL # BLD AUTO: 0.1 K/UL (ref 0–0.51)
EOSINOPHIL NFR BLD: 0.9 % (ref 0–6.9)
ERYTHROCYTE [DISTWIDTH] IN BLOOD BY AUTOMATED COUNT: 59.9 FL (ref 35.9–50)
GFR SERPL CREATININE-BSD FRML MDRD: 37 ML/MIN/1.73 M 2
GLOBULIN SER CALC-MCNC: 2.6 G/DL (ref 1.9–3.5)
GLUCOSE BLD-MCNC: 111 MG/DL (ref 65–99)
GLUCOSE BLD-MCNC: 124 MG/DL (ref 65–99)
GLUCOSE BLD-MCNC: 77 MG/DL (ref 65–99)
GLUCOSE BLD-MCNC: 82 MG/DL (ref 65–99)
GLUCOSE SERPL-MCNC: 147 MG/DL (ref 65–99)
HCT VFR BLD AUTO: 26.8 % (ref 37–47)
HGB BLD-MCNC: 8.3 G/DL (ref 12–16)
IMM GRANULOCYTES # BLD AUTO: 0.47 K/UL (ref 0–0.11)
IMM GRANULOCYTES NFR BLD AUTO: 4.5 % (ref 0–0.9)
LYMPHOCYTES # BLD AUTO: 0.96 K/UL (ref 1–4.8)
LYMPHOCYTES NFR BLD: 9.1 % (ref 22–41)
MCH RBC QN AUTO: 28.4 PG (ref 27–33)
MCHC RBC AUTO-ENTMCNC: 31 G/DL (ref 33.6–35)
MCV RBC AUTO: 91.8 FL (ref 81.4–97.8)
MONOCYTES # BLD AUTO: 0.5 K/UL (ref 0–0.85)
MONOCYTES NFR BLD AUTO: 4.7 % (ref 0–13.4)
NEUTROPHILS # BLD AUTO: 8.48 K/UL (ref 2–7.15)
NEUTROPHILS NFR BLD: 80.4 % (ref 44–72)
NRBC # BLD AUTO: 0.02 K/UL
NRBC BLD AUTO-RTO: 0.2 /100 WBC
PLATELET # BLD AUTO: 244 K/UL (ref 164–446)
PMV BLD AUTO: 9.2 FL (ref 9–12.9)
POTASSIUM SERPL-SCNC: 4.6 MMOL/L (ref 3.6–5.5)
PROT SERPL-MCNC: 5.8 G/DL (ref 6–8.2)
RBC # BLD AUTO: 2.92 M/UL (ref 4.2–5.4)
SODIUM SERPL-SCNC: 138 MMOL/L (ref 135–145)
WBC # BLD AUTO: 10.6 K/UL (ref 4.8–10.8)

## 2017-05-03 PROCEDURE — 82962 GLUCOSE BLOOD TEST: CPT | Mod: 91

## 2017-05-03 PROCEDURE — A9270 NON-COVERED ITEM OR SERVICE: HCPCS | Performed by: HOSPITALIST

## 2017-05-03 PROCEDURE — 700102 HCHG RX REV CODE 250 W/ 637 OVERRIDE(OP): Performed by: HOSPITALIST

## 2017-05-03 PROCEDURE — A9270 NON-COVERED ITEM OR SERVICE: HCPCS | Performed by: INTERNAL MEDICINE

## 2017-05-03 PROCEDURE — 700102 HCHG RX REV CODE 250 W/ 637 OVERRIDE(OP): Performed by: INTERNAL MEDICINE

## 2017-05-03 PROCEDURE — 80053 COMPREHEN METABOLIC PANEL: CPT

## 2017-05-03 PROCEDURE — 71010 DX-CHEST-PORTABLE (1 VIEW): CPT

## 2017-05-03 PROCEDURE — 700102 HCHG RX REV CODE 250 W/ 637 OVERRIDE(OP): Performed by: NURSE PRACTITIONER

## 2017-05-03 PROCEDURE — 99233 SBSQ HOSP IP/OBS HIGH 50: CPT | Performed by: INTERNAL MEDICINE

## 2017-05-03 PROCEDURE — A9270 NON-COVERED ITEM OR SERVICE: HCPCS | Performed by: NURSE PRACTITIONER

## 2017-05-03 PROCEDURE — 770006 HCHG ROOM/CARE - MED/SURG/GYN SEMI*

## 2017-05-03 PROCEDURE — 700111 HCHG RX REV CODE 636 W/ 250 OVERRIDE (IP): Performed by: HOSPITALIST

## 2017-05-03 PROCEDURE — 36415 COLL VENOUS BLD VENIPUNCTURE: CPT

## 2017-05-03 PROCEDURE — 85025 COMPLETE CBC W/AUTO DIFF WBC: CPT

## 2017-05-03 RX ORDER — TIOTROPIUM BROMIDE 18 UG/1
18 CAPSULE ORAL; RESPIRATORY (INHALATION) DAILY
Qty: 30 CAP | Refills: 3 | Status: SHIPPED | OUTPATIENT
Start: 2017-05-03 | End: 2017-12-27 | Stop reason: SDUPTHER

## 2017-05-03 RX ORDER — IPRATROPIUM BROMIDE AND ALBUTEROL SULFATE 2.5; .5 MG/3ML; MG/3ML
3 SOLUTION RESPIRATORY (INHALATION) EVERY 4 HOURS PRN
Qty: 10 VIAL | Refills: 1 | Status: ON HOLD | OUTPATIENT
Start: 2017-05-03 | End: 2017-05-08

## 2017-05-03 RX ORDER — ASCORBIC ACID 500 MG
500 TABLET ORAL
Qty: 30 TAB | Refills: 3 | Status: SHIPPED | OUTPATIENT
Start: 2017-05-03 | End: 2017-05-03

## 2017-05-03 RX ORDER — HYDROCORTISONE 20 MG/1
20 TABLET ORAL DAILY
Qty: 30 TAB | Refills: 0 | Status: ON HOLD | OUTPATIENT
Start: 2017-05-03 | End: 2017-05-26

## 2017-05-03 RX ORDER — DULOXETIN HYDROCHLORIDE 60 MG/1
60 CAPSULE, DELAYED RELEASE ORAL DAILY
Qty: 30 CAP | Refills: 1 | Status: SHIPPED | OUTPATIENT
Start: 2017-05-03 | End: 2017-10-05 | Stop reason: SDUPTHER

## 2017-05-03 RX ORDER — SULFAMETHOXAZOLE AND TRIMETHOPRIM 800; 160 MG/1; MG/1
1 TABLET ORAL EVERY 12 HOURS
Qty: 20 QUANTITY SUFFICIENT | Refills: 0 | Status: ON HOLD | OUTPATIENT
Start: 2017-05-03 | End: 2017-05-10

## 2017-05-03 RX ADMIN — OXYCODONE HYDROCHLORIDE 20 MG: 10 TABLET ORAL at 13:42

## 2017-05-03 RX ADMIN — THERA TABS 1 TABLET: TAB at 08:53

## 2017-05-03 RX ADMIN — AMLODIPINE BESYLATE 5 MG: 5 TABLET ORAL at 08:46

## 2017-05-03 RX ADMIN — FAMOTIDINE 20 MG: 20 TABLET, FILM COATED ORAL at 08:46

## 2017-05-03 RX ADMIN — SULFAMETHOXAZOLE AND TRIMETHOPRIM 1 TABLET: 800; 160 TABLET ORAL at 20:40

## 2017-05-03 RX ADMIN — LISINOPRIL 20 MG: 20 TABLET ORAL at 08:53

## 2017-05-03 RX ADMIN — HYDROCORTISONE 20 MG: 20 TABLET ORAL at 08:45

## 2017-05-03 RX ADMIN — CYCLOBENZAPRINE HYDROCHLORIDE 10 MG: 10 TABLET, FILM COATED ORAL at 20:40

## 2017-05-03 RX ADMIN — BUDESONIDE AND FORMOTEROL FUMARATE DIHYDRATE 2 PUFF: 160; 4.5 AEROSOL RESPIRATORY (INHALATION) at 08:45

## 2017-05-03 RX ADMIN — TRAZODONE HYDROCHLORIDE 75 MG: 50 TABLET ORAL at 20:40

## 2017-05-03 RX ADMIN — BUDESONIDE AND FORMOTEROL FUMARATE DIHYDRATE 2 PUFF: 160; 4.5 AEROSOL RESPIRATORY (INHALATION) at 20:39

## 2017-05-03 RX ADMIN — FERROUS SULFATE TAB 325 MG (65 MG ELEMENTAL FE) 325 MG: 325 (65 FE) TAB at 16:57

## 2017-05-03 RX ADMIN — CARVEDILOL 3.12 MG: 3.12 TABLET, FILM COATED ORAL at 08:46

## 2017-05-03 RX ADMIN — CARVEDILOL 3.12 MG: 3.12 TABLET, FILM COATED ORAL at 16:57

## 2017-05-03 RX ADMIN — MONTELUKAST SODIUM 10 MG: 10 TABLET, FILM COATED ORAL at 08:53

## 2017-05-03 RX ADMIN — FERROUS SULFATE TAB 325 MG (65 MG ELEMENTAL FE) 325 MG: 325 (65 FE) TAB at 08:45

## 2017-05-03 RX ADMIN — LEVOTHYROXINE SODIUM 75 MCG: 75 TABLET ORAL at 05:36

## 2017-05-03 RX ADMIN — GABAPENTIN 600 MG: 300 CAPSULE ORAL at 16:56

## 2017-05-03 RX ADMIN — SIMVASTATIN 10 MG: 20 TABLET, FILM COATED ORAL at 20:40

## 2017-05-03 RX ADMIN — HYDROCORTISONE 20 MG: 20 TABLET ORAL at 20:39

## 2017-05-03 RX ADMIN — TIOTROPIUM BROMIDE 1 CAPSULE: 18 CAPSULE ORAL; RESPIRATORY (INHALATION) at 08:46

## 2017-05-03 RX ADMIN — SULFAMETHOXAZOLE AND TRIMETHOPRIM 1 TABLET: 800; 160 TABLET ORAL at 08:53

## 2017-05-03 RX ADMIN — INSULIN HUMAN 15 UNITS: 100 INJECTION, SUSPENSION SUBCUTANEOUS at 08:43

## 2017-05-03 RX ADMIN — OXYCODONE HYDROCHLORIDE 20 MG: 10 TABLET ORAL at 20:40

## 2017-05-03 RX ADMIN — GABAPENTIN 600 MG: 300 CAPSULE ORAL at 20:40

## 2017-05-03 RX ADMIN — FUROSEMIDE 20 MG: 20 TABLET ORAL at 08:45

## 2017-05-03 RX ADMIN — INSULIN HUMAN 15 UNITS: 100 INJECTION, SUSPENSION SUBCUTANEOUS at 18:42

## 2017-05-03 RX ADMIN — DULOXETINE HYDROCHLORIDE 60 MG: 60 CAPSULE, DELAYED RELEASE ORAL at 08:45

## 2017-05-03 RX ADMIN — GABAPENTIN 600 MG: 300 CAPSULE ORAL at 08:46

## 2017-05-03 ASSESSMENT — PAIN SCALES - GENERAL
PAINLEVEL_OUTOF10: 5
PAINLEVEL_OUTOF10: 2

## 2017-05-03 ASSESSMENT — LIFESTYLE VARIABLES: EVER_SMOKED: YES

## 2017-05-03 NOTE — PROGRESS NOTES
Received report, assumed pt care. Pt a&o x 4, VSS, Assessment completed. BLE 2+edema noted. Left hand swelling also noted.  Resting comfortably in bed with call light, bedside table in reach. Pt complains of 7/10 bilateral hands and lower back pain.  Pain medication will be administered as per MAR. Side rails up x 2. Instructed to use call light when needing assistance, verbalized understanding. Bed alarm refused, pt up self and steady, bed in low position. Will continue to monitor.

## 2017-05-03 NOTE — FACE TO FACE
Face to Face Note  -  Durable Medical Equipment    Valorie Hemphill D.O. - NPI: [id number]  I certify that this patient is under my care and that they had a durable medical equipment(DME)face to face encounter by myself that meets the physician DME face-to-face encounter requirements with this patient on:    Date of encounter:   Patient:                    MRN:                       YOB: 2018  Priya Callahan  [id number]  1950     The encounter with the patient was in whole, or in part, for the following medical condition, which is the primary reason for durable medical equipment:  COPD    I certify that, based on my findings, the following durable medical equipment is medically necessary:  Nebulizer.    HOME O2 Saturation Measurements:(Values must be present for Home Oxygen orders)         ,     ,         My Clinical findings support the need for the above equipment due to:  Hypoxia    Supporting Symptoms: sob, RAMOS

## 2017-05-03 NOTE — FACE TO FACE
Face to Face Note  -  Durable Medical Equipment    Valorie Hemphill D.O. - NPI: [id number]  I certify that this patient is under my care and that they had a durable medical equipment(DME)face to face encounter by myself that meets the physician DME face-to-face encounter requirements with this patient on:    Date of encounter:   Patient:                    MRN:                       YOB: 2018  Priya Callahan  [id number]  1950     The encounter with the patient was in whole, or in part, for the following medical condition, which is the primary reason for durable medical equipment:  COPD and Asthma    I certify that, based on my findings, the following durable medical equipment is medically necessary:  Oxygen.    HOME O2 Saturation Measurements:(Values must be present for Home Oxygen orders)  Room air sat at rest: 70  Room air sat with amb: 78  With liters of O2: 4, O2 sat at rest with O2: 93  With Liters of O2: 6, O2 sat with amb with O2 : 89  Is the patient mobile?: Yes    My Clinical findings support the need for the above equipment due to:  Hypoxia    Supporting Symptoms: sob, orr

## 2017-05-03 NOTE — CARE PLAN
Problem: Pain Management  Goal: Pain level will decrease to patient’s comfort goal  Intervention: Follow pain managment plan developed in collaboration with patient and Interdisciplinary Team  Oxycodone administered as per MAR to relieve pt's generalized pain and promote rest. Pt pain decreased from 8/10 to 4/10.      Problem: Psychosocial Needs:  Goal: Level of anxiety will decrease  Intervention: Identify and develop with patient strategies to cope with anxiety triggers  Lights turned low, noise minimized to relieve pt anxiety and promote rest. Pt able to rest comfortably.

## 2017-05-03 NOTE — PROGRESS NOTES
"Pt greeted nurse this AM with \"I am going home today. I've been here long enough.\" [facility] called.  "

## 2017-05-04 VITALS
HEIGHT: 64 IN | DIASTOLIC BLOOD PRESSURE: 68 MMHG | BODY MASS INDEX: 42 KG/M2 | HEART RATE: 79 BPM | OXYGEN SATURATION: 92 % | WEIGHT: 246.03 LBS | RESPIRATION RATE: 20 BRPM | SYSTOLIC BLOOD PRESSURE: 138 MMHG | TEMPERATURE: 98.5 F

## 2017-05-04 LAB
GLUCOSE BLD-MCNC: 130 MG/DL (ref 65–99)
GLUCOSE BLD-MCNC: 73 MG/DL (ref 65–99)

## 2017-05-04 PROCEDURE — 700102 HCHG RX REV CODE 250 W/ 637 OVERRIDE(OP): Performed by: INTERNAL MEDICINE

## 2017-05-04 PROCEDURE — 82962 GLUCOSE BLOOD TEST: CPT

## 2017-05-04 PROCEDURE — 700101 HCHG RX REV CODE 250

## 2017-05-04 PROCEDURE — A9270 NON-COVERED ITEM OR SERVICE: HCPCS | Performed by: INTERNAL MEDICINE

## 2017-05-04 PROCEDURE — 700101 HCHG RX REV CODE 250: Performed by: INTERNAL MEDICINE

## 2017-05-04 PROCEDURE — A9270 NON-COVERED ITEM OR SERVICE: HCPCS | Performed by: HOSPITALIST

## 2017-05-04 PROCEDURE — 94760 N-INVAS EAR/PLS OXIMETRY 1: CPT

## 2017-05-04 PROCEDURE — 94640 AIRWAY INHALATION TREATMENT: CPT

## 2017-05-04 PROCEDURE — 99239 HOSP IP/OBS DSCHRG MGMT >30: CPT | Performed by: INTERNAL MEDICINE

## 2017-05-04 PROCEDURE — 700102 HCHG RX REV CODE 250 W/ 637 OVERRIDE(OP): Performed by: HOSPITALIST

## 2017-05-04 RX ADMIN — MONTELUKAST SODIUM 10 MG: 10 TABLET, FILM COATED ORAL at 08:07

## 2017-05-04 RX ADMIN — CARVEDILOL 3.12 MG: 3.12 TABLET, FILM COATED ORAL at 08:07

## 2017-05-04 RX ADMIN — AMLODIPINE BESYLATE 5 MG: 5 TABLET ORAL at 08:07

## 2017-05-04 RX ADMIN — BUDESONIDE AND FORMOTEROL FUMARATE DIHYDRATE 2 PUFF: 160; 4.5 AEROSOL RESPIRATORY (INHALATION) at 08:11

## 2017-05-04 RX ADMIN — GABAPENTIN 600 MG: 300 CAPSULE ORAL at 08:07

## 2017-05-04 RX ADMIN — INSULIN HUMAN 15 UNITS: 100 INJECTION, SUSPENSION SUBCUTANEOUS at 09:21

## 2017-05-04 RX ADMIN — THERA TABS 1 TABLET: TAB at 08:07

## 2017-05-04 RX ADMIN — HYDROCORTISONE 20 MG: 20 TABLET ORAL at 08:10

## 2017-05-04 RX ADMIN — FUROSEMIDE 20 MG: 20 TABLET ORAL at 08:07

## 2017-05-04 RX ADMIN — LISINOPRIL 20 MG: 20 TABLET ORAL at 08:07

## 2017-05-04 RX ADMIN — FERROUS SULFATE TAB 325 MG (65 MG ELEMENTAL FE) 325 MG: 325 (65 FE) TAB at 08:07

## 2017-05-04 RX ADMIN — FAMOTIDINE 20 MG: 20 TABLET, FILM COATED ORAL at 08:07

## 2017-05-04 RX ADMIN — SULFAMETHOXAZOLE AND TRIMETHOPRIM 1 TABLET: 800; 160 TABLET ORAL at 08:07

## 2017-05-04 RX ADMIN — LEVOTHYROXINE SODIUM 75 MCG: 75 TABLET ORAL at 06:07

## 2017-05-04 RX ADMIN — TIOTROPIUM BROMIDE 1 CAPSULE: 18 CAPSULE ORAL; RESPIRATORY (INHALATION) at 08:10

## 2017-05-04 RX ADMIN — IPRATROPIUM BROMIDE AND ALBUTEROL SULFATE 3 ML: .5; 3 SOLUTION RESPIRATORY (INHALATION) at 11:21

## 2017-05-04 RX ADMIN — DULOXETINE HYDROCHLORIDE 60 MG: 60 CAPSULE, DELAYED RELEASE ORAL at 08:07

## 2017-05-04 ASSESSMENT — ENCOUNTER SYMPTOMS
MEMORY LOSS: 0
BACK PAIN: 1
COUGH: 1
CHILLS: 0
ABDOMINAL PAIN: 0
NECK PAIN: 0
NAUSEA: 0
FEVER: 0
SHORTNESS OF BREATH: 1
HEADACHES: 0
WEAKNESS: 1
DEPRESSION: 0
FOCAL WEAKNESS: 0

## 2017-05-04 ASSESSMENT — PAIN SCALES - GENERAL
PAINLEVEL_OUTOF10: 3
PAINLEVEL_OUTOF10: 3

## 2017-05-04 ASSESSMENT — LIFESTYLE VARIABLES: EVER_SMOKED: YES

## 2017-05-04 NOTE — DISCHARGE SUMMARY
DATE OF ADMISSION:  04/24/2017    DATE OF DISCHARGE:  05/03/2017    PRIMARY CARE PHYSICIAN:  Mickie Lawson MD    CHIEF COMPLAINT ON ADMISSION:  Shortness of breath.    DISCHARGE DIAGNOSES:  1.  Chronic obstructive pulmonary disease exacerbation with chronic hypoxia,   requiring 4 liters oxygen supplementation.  2.  Hospital-acquired pneumonia.  3.  Sepsis, resolved.  4.  Chronic kidney disease, stage III.  5.  Type 2 diabetes mellitus.  6.  Hypertension.  7.  Hypertensive heart disease with heart failure, improving.  8.  Grade II diastolic dysfunction.  9.  Anemia of chronic disease.  10.  Morbid obesity.  11.  Obstructive sleep apnea.    DISCHARGE MEDICATIONS:  1.  Roxicodone 10 mg 1-2 tabs q. 6 hours p.r.n. pain.  2.  Combivent 18/103 mcg two puffs every 6 hours p.r.n. shortness of breath.  3.  Advair 500/50 mcg 1 puff q. 12 hours.  4.  DuoNeb 3 mL every 4 hours p.r.n. shortness of breath.  5.  Singulair 10 mg daily.  6.  Spiriva 18 mcg daily.  7.  Neurontin 600 mg p.o. 3 times a day.  8.  Cymbalta 60 mg p.o. daily.  9.  Lexapro 10 mg p.o. daily.  10.  Trazodone 100 mg three tablets p.o. nightly.  11.  Lantus 20 units subQ q. evening.  12.  Zocor 10 mg p.o. q. evening.  13.  Coreg 3.125 mg p.o. b.i.d.  14.  Norvasc 10 mg daily.  15.  Cortef 20 mg p.o. daily.  16.  Ferrous gluconate 324 mg p.o. b.i.d.  17.  K-Dur 20 mEq p.o. daily.  18.  Bactrim double strength 800/160 mg p.o. q. 12 hours x10 days.  19.  Multivitamin 1 tab daily.  20.  Flexeril 10 mg p.o. 3 times a day p.r.n. muscle spasm.  21.  Synthroid 75 mcg daily.  22.  Prilosec 20 mg p.o. daily.  23.  Ascorbic acid 500 mg p.o. 3 times a day.    HOSPITAL COURSE:  Patient is a pleasant 66-year-old  female with   morbid obesity and chronic hypoxic respiratory failure with known history of   COPD, on baseline oxygen of 4 liters, presents with shortness of breath and   cough.  Patient did have a chest x-ray completed, which revealed bilateral    pulmonary infiltrates with BNP of 308 and enlarged cardiac silhouette with   mildly elevated troponin of 0.09 as well as mild leukocytosis, white count of   12.1.  Patient was admitted for severe sepsis secondary to hospital-acquired   pneumonia and was placed on IV antibiotics as well as septic protocol and   pressor support with Levophed.  Patient did require intensive care unit   monitoring.  During the course of her stay, patient's symptoms did improve.    Her cultures were positive.  Respiratory culture was positive for Klebsiella,   which is pansensitive; however, is exception of ampicillin.  Patient has been   weaned down on oxygen supplementation.  Patient is near baseline.  However,   with exertion, patient is requiring 6-7 liters of oxygen supplementation.    Patient is adamant on discharge to home even with her slow improvement of   respiratory symptoms.  Patient does report dyspnea on exertion; however,   states that she feels better at home and is requesting discharge to home.    Patient has been transitioned to oral Bactrim to finish course of antibiotics.    During the course of her stay, patient does have a history of anemia of   chronic disease and was given a unit transfusion with stabilization of H and   H.  FOBT is negative.  Blood cultures remained negative.  At this point,   patient is to be discharged to home on increased oxygen supplementation needs,   to follow up with her primary care provider in a week.    Of note, there were some concerns on regarding patient's oxygen   supplementation as patient does have issues regarding her home oxygen   supplier.  Social workers are assisting with locating a different option   supplier agency.  Patient will also be discharged home on nebulizer as well as   nebulized prescription as needed.    DISPOSITION:  To home with home oxygen.    CONDITION:  Fair.    FOLLOWUP INSTRUCTIONS:  With primary care provider in 1 week, Bactrim x10   days.    Total  discharge preparation time is 50 minutes.       ____________________________________     CHRISS HOWARD DO    EN / DMITRY    DD:  05/03/2017 14:34:42  DT:  05/04/2017 00:31:53    D#:  [id number]  Job#:  [id number]    Addendum: 5/4: OK for dc home with oxygen, feels better, improved sob. LE edema improving with leg elevation

## 2017-05-04 NOTE — DISCHARGE INSTRUCTIONS
"Discharge Instructions    Discharged to home by car with relative. Discharged via wheelchair, hospital escort: Yes.  Special equipment needed: Oxygen    Be sure to schedule a follow-up appointment with your primary care doctor or any specialists as instructed.     Discharge Plan:   Diet Plan: Discussed  Activity Level: Discussed  Smoking Cessation Offered: Patient Counseled  Confirmed Follow up Appointment: Appointment Scheduled  Confirmed Symptoms Management: Discussed  Medication Reconciliation Updated: Yes  Influenza Vaccine Indication: Not indicated: Previously immunized this influenza season and > 8 years of age    I understand that a diet low in cholesterol, fat, and sodium is recommended for good health. Unless I have been given specific instructions below for another diet, I accept this instruction as my diet prescription.   Other diet: regular    Special Instructions: None    · Is patient discharged on Warfarin / Coumadin?   No   MY COPD ACTION PLAN     It is recommended that patients and physicians /healthcare providers complete this action plan together. This plan should be discussed at each physician visit and updated as needed.    The green, yellow and red zones show groups of symptoms of COPD. This list of symptoms is not comprehensive, and you may experience other symptoms. In the \"Actions\" column, your healthcare provider has recommended actions for you to take based on your symptoms.    Patient Name: Priya Callahan   YOB: 1950   Last Updated on:     Green Zone:  I am doing well today Actions   •  Usual activitiy and exercise level •  Take daily medications   •  Usual amounts of cough and phlegm/mucus •  Use oxygen as prescribed   •  Sleep well at night •  Continue regular exercise/diet plan   •  Appetite is good •  At all times avoid cigarette smoke, inhaled irritants     Daily Medications (these medications are taken every day):               Yellow Zone:  I am having a bad " "day or a COPD flare Actions   •  More breathless than usual •  Continue daily medications   •  I have less energy for my daily activities •  Use quick relief inhaler as ordered   •  Increased or thicker phlegm/mucus •  Use oxygen as prescribed   •  Using quick relief inhaler/nebulizer more often •  Get plenty of rest   •  Swelling of ankles more than usual •  Use pursed lip breathing   •  More coughing than usual •  At all times avoid cigarette smoke, inhaled irritants   •  I feel like I have a \"chest cold\"   •  Poor sleep and my symptoms woke me up   •  My appetite is not good   •  My medicine is not helping    •  Call provider immediately if symptoms don’t improve     Continue daily medications, add rescue medications:               Medications to be used during a flare up, (as Discussed with Provider):             Red Zone:  I need urgent medical care Actions   •  Severe shortness of breath even at rest •  Call 911 or seek medical care immediately   •  Not able to do any activity because of breathing    •  Fever or shaking chills    •  Feeling confused or very drowsy     •  Chest pains    •  Coughing up blood      Discharge Instructions    Discharged to home by car with friend. Discharged via wheelchair, hospital escort: Yes.  Special equipment needed: Oxygen    Be sure to schedule a follow-up appointment with your primary care doctor or any specialists as instructed.     Discharge Plan:   Diet Plan: Discussed  Activity Level: Discussed  Smoking Cessation Offered: Patient Counseled  Confirmed Follow up Appointment: Appointment Scheduled  Confirmed Symptoms Management: Discussed  Medication Reconciliation Updated: Yes  Influenza Vaccine Indication: Not indicated: Previously immunized this influenza season and > 8 years of age    I understand that a diet low in cholesterol, fat, and sodium is recommended for good health. Unless I have been given specific instructions below for another diet, I accept this " instruction as my diet prescription.   Other diet: low carb/low sodium/ restrict fluid intake    Special Instructions: None    · Is patient discharged on Warfarin / Coumadin?   No     · Is patient Post Blood Transfusion?  No    Depression / Suicide Risk    As you are discharged from this [facility] facility, it is important to learn how to keep safe from harming yourself.    Recognize the warning signs:  · Abrupt changes in personality, positive or negative- including increase in energy   · Giving away possessions  · Change in eating patterns- significant weight changes-  positive or negative  · Change in sleeping patterns- unable to sleep or sleeping all the time   · Unwillingness or inability to communicate  · Depression  · Unusual sadness, discouragement and loneliness  · Talk of wanting to die  · Neglect of personal appearance   · Rebelliousness- reckless behavior  · Withdrawal from people/activities they love  · Confusion- inability to concentrate     If you or a loved one observes any of these behaviors or has concerns about self-harm, here's what you can do:  · Talk about it- your feelings and reasons for harming yourself  · Remove any means that you might use to hurt yourself (examples: pills, rope, extension cords, firearm)  · Get professional help from the community (Mental Health, Substance Abuse, psychological counseling)  · Do not be alone:Call your Safe Contact- someone whom you trust who will be there for you.  · Call your local CRISIS HOTLINE 858-3017 or 736-402-6607  · Call your local Children's Mobile Crisis Response Team [place] (920) 516-3731 or www.[employer]  · Call the toll free National Suicide Prevention Hotlines   · National Suicide Prevention Lifeline 457-353-GTZZ (4373)  · National Hope Line Network 800-SUICIDE (977-1954)      Sepsis, Adult  Sepsis is a serious infection of your blood or tissues that affects your whole body. The infection that causes sepsis may be  bacterial, viral, fungal, or parasitic. Sepsis may be life threatening. Sepsis can cause your blood pressure to drop. This may result in shock. Shock causes your central nervous system and your organs to stop working correctly.   RISK FACTORS  Sepsis can happen in anyone, but it is more likely to happen in people who have weakened immune systems.  SIGNS AND SYMPTOMS   Symptoms of sepsis can include:  · Fever or low body temperature (hypothermia).  · Rapid breathing (hyperventilation).  · Chills.  · Rapid heartbeat (tachycardia).  · Confusion or light-headedness.  · Trouble breathing.  · Urinating much less than usual.  · Cool, clammy skin or red, flushed skin.  · Other problems with the heart, kidneys, or brain.  DIAGNOSIS   Your health care provider will likely do tests to look for an infection, to see if the infection has spread to your blood, and to see how serious your condition is. Tests can include:  2. Blood tests, including cultures of your blood.  3. Cultures of other fluids from your body, such as:  1. Urine.  2. Pus from wounds.  3. Mucus coughed up from your lungs.  4. Urine tests other than cultures.  5. X-ray exams or other imaging tests.  TREATMENT   Treatment will begin with elimination of the source of infection. If your sepsis is likely caused by a bacterial or fungal infection, you will be given antibiotic or antifungal medicines.  You may also receive:  2. Oxygen.  3. Fluids through an IV tube.  4. Medicines to increase your blood pressure.  5. A machine to clean your blood (dialysis) if your kidneys fail.  6. A machine to help you breathe if your lungs fail.  SEEK IMMEDIATE MEDICAL CARE IF:  You get an infection or develop any of the signs and symptoms of sepsis after surgery or a hospitalization.     This information is not intended to replace advice given to you by your health care provider. Make sure you discuss any questions you have with your health care provider.     Document Released:  09/15/2004 Document Revised: 05/03/2016 Document Reviewed: 08/25/2014  [employer] Interactive Patient Education ©2016 [employer]      Depression / Suicide Risk    As you are discharged from this [facility] facility, it is important to learn how to keep safe from harming yourself.    Recognize the warning signs:  · Abrupt changes in personality, positive or negative- including increase in energy   · Giving away possessions  · Change in eating patterns- significant weight changes-  positive or negative  · Change in sleeping patterns- unable to sleep or sleeping all the time   · Unwillingness or inability to communicate  · Depression  · Unusual sadness, discouragement and loneliness  · Talk of wanting to die  · Neglect of personal appearance   · Rebelliousness- reckless behavior  · Withdrawal from people/activities they love  · Confusion- inability to concentrate     If you or a loved one observes any of these behaviors or has concerns about self-harm, here's what you can do:  · Talk about it- your feelings and reasons for harming yourself  · Remove any means that you might use to hurt yourself (examples: pills, rope, extension cords, firearm)  · Get professional help from the community (Mental Health, Substance Abuse, psychological counseling)  · Do not be alone:Call your Safe Contact- someone whom you trust who will be there for you.  · Call your local CRISIS HOTLINE 219-1258 or 688-617-5777  · Call your local Children's Mobile Crisis Response Team [place] (394) 604-5952 or www.[employer]  · Call the toll free National Suicide Prevention Hotlines   · National Suicide Prevention Lifeline 295-768-VCJA (2235)  · National Hope Line Network 800-SUICIDE (294-8294)

## 2017-05-04 NOTE — PROGRESS NOTES
Assumed care of pt, received report from day shift RN, pt assessed.  Pt complaining of 5/10 pain, pt medicated per MAR.  Pt is A&O x4.  Pt fall risk, wearing treaded socks, bed locked and in lowest position.  Pt refusing bed alarm, pt educated on need for bed alarm but still refusing.  Pt instructed to call for assistance prior to getting OOB, pt verbalized understanding.  Call light, phone, and personal belongings within reach.

## 2017-05-04 NOTE — PROGRESS NOTES
Hospital Medicine Progress Note, Adult, Complex               Author: Valorie Hemphill Date & Time created: 5/4/2017  7:55 AM     ID/CC: 65yo with multiple medical problems (COPD, CKD III, HFPLVEF, DM, HTN) found down at home altered and hypoxic.  Admitted with Dx of HCAP, sepsis       Interval History:  Late entry 5/3  Tolerating OOB, still sob on exertion    Review of Systems:  Review of Systems   Constitutional: Negative for fever and chills.   HENT: Negative for congestion and tinnitus.    Respiratory: Positive for cough and shortness of breath (improving).    Cardiovascular: Negative for chest pain.   Gastrointestinal: Negative for nausea and abdominal pain.   Musculoskeletal: Positive for back pain. Negative for neck pain.   Skin: Negative for rash.   Neurological: Positive for weakness. Negative for focal weakness and headaches.   Psychiatric/Behavioral: Negative for depression and memory loss.       Physical Exam:  Physical Exam   Constitutional: She is oriented to person, place, and time. No distress.   Morbid obesity   HENT:   Head: Normocephalic and atraumatic.   Eyes: Conjunctivae are normal.   Neck: Normal range of motion. Neck supple.   Cardiovascular: Normal rate, regular rhythm and intact distal pulses.    Murmur heard.  Pulmonary/Chest: Effort normal. No respiratory distress. She has no wheezes. She has rales.   Abdominal: Soft. She exhibits no distension.   Musculoskeletal: She exhibits edema.   Trace to 1+ edema   Neurological: She is alert and oriented to person, place, and time. No cranial nerve deficit.   Skin: Skin is warm and dry. She is not diaphoretic.   Psychiatric: She has a normal mood and affect. Judgment and thought content normal.   Nursing note and vitals reviewed.      Labs:        Invalid input(s): [id number]      Recent Labs      05/03/17   0333   SODIUM  138   POTASSIUM  4.6   CHLORIDE  104   CO2  28   BUN  26*   CREATININE  1.43*   CALCIUM  9.2     Recent Labs      05/03/17    0333   ALTSGPT  14   ASTSGOT  14   ALKPHOSPHAT  42   TBILIRUBIN  0.5   GLUCOSE  147*     Recent Labs      17   0940  17   0333   RBC  2.81*  2.92*   HEMOGLOBIN  7.8*  8.3*   HEMATOCRIT  25.8*  26.8*   PLATELETCT  209  244     Recent Labs      17   0940  17   0333   WBC  8.5  10.6   NEUTSPOLYS  71.10  80.40*   LYMPHOCYTES  21.90*  9.10*   MONOCYTES  3.50  4.70   EOSINOPHILS  2.60  0.90   BASOPHILS  0.00  0.40   ASTSGOT   --   14   ALTSGPT   --   14   ALKPHOSPHAT   --   42   TBILIRUBIN   --   0.5           Hemodynamics:  Temp (24hrs), Av.3 °C (97.4 °F), Min:36.2 °C (97.2 °F), Max:36.4 °C (97.6 °F)  Temperature: 36.2 °C (97.2 °F)  Pulse  Av.4  Min: 56  Max: 102   Blood Pressure : 148/49 mmHg     Respiratory:    Respiration: 18, Pulse Oximetry: 93 %     Work Of Breathing / Effort: Mild  RUL Breath Sounds: Diminished, RML Breath Sounds: Diminished, RLL Breath Sounds: Diminished, BIRD Breath Sounds: Diminished, LLL Breath Sounds: Diminished  Fluids:    Intake/Output Summary (Last 24 hours) at 17 0755  Last data filed at 17 1800   Gross per 24 hour   Intake   1080 ml   Output      0 ml   Net   1080 ml        GI/Nutrition:  Orders Placed This Encounter   Procedures   • DISCONTINUE DIET TRAY     Standing Status: Standing      Number of Occurrences: 1      Standing Expiration Date:    • DIET ORDER     Standing Status: Standing      Number of Occurrences: 1      Standing Expiration Date:      Order Specific Question:  Diet:     Answer:  Diabetic [3]     Order Specific Question:  Diet:     Answer:  Cardiac [6]     Order Specific Question:  Consistency/Fluid modifications:     Answer:  1500 ml Fluid Restriction [9]     Medical Decision Making, by Problem:  Active Hospital Problems    Diagnosis   • Hypertensive heart disease with heart failure (CMS-HCC) [I11.0]  GD II diastolic dysfunction with preserved LVEF  Slowly starting her ACE and BB that she takes as outpt.   cont lasix and  norvasc   • HCAP (healthcare-associated pneumonia) [J18.9]  -oral bactrim   -Resp culture growing klebsiella    • COPD (chronic obstructive pulmonary disease) (CMS-HCC) [J44.9]with chronic hypoxia- on 3.5L at home  - taper O2, encourage ambuation  -rt protocol, inhalers    • DM type 2, uncontrolled, with renal complications (CMS-HCC) [E11.29, E11.65]  7.9 A1c earlier this year  Cont  NPH and SSI    • Anemia of chronic disease   S/p 1 u prbc 5/1, h/h stable, monitor  Neg  FOBT    Cont iron supplement    • HTN (hypertension) [I10]- uncontrolled  cont her coreg and lisinopril    • Sepsis (CMS-HCC) [A41.9]  MRSA swab neg so dc'd Vanc cont zosyn  Respiratory cultures growing klebsiella  -improved    • Elevated troponin [R79.89]  No significant EKG changes  Likely demand ischemia  Pt is ASx'c   • EDITH (obstructive sleep apnea) [G47.33]   • Chronic kidney disease, stage 3 [N18.3]with ARF- improving  - monitor  - off ivf  - f/u am bmp  Avoid Nephrotoxins   HCAP- klebsiella, slowly improving, repeat CXR, R improving, b/l pna    Dispo:   To home with home oxygen in 1-2 days, increase O2 needs  Encourage OOB/IS use  Medications reviewed, EKG reviewed, Labs reviewed and Radiology images reviewed        DVT Prophylaxis: Heparin  DVT prophylaxis - mechanical: SCDs  Ulcer prophylaxis: Yes  Antibiotics: Treating active infection/contamination beyond 24 hours perioperative coverage

## 2017-05-05 ENCOUNTER — PATIENT OUTREACH (OUTPATIENT)
Dept: HEALTH INFORMATION MANAGEMENT | Facility: OTHER | Age: 67
End: 2017-05-05

## 2017-05-07 ENCOUNTER — APPOINTMENT (OUTPATIENT)
Dept: RADIOLOGY | Facility: MEDICAL CENTER | Age: 67
DRG: 492 | End: 2017-05-07
Attending: EMERGENCY MEDICINE
Payer: MEDICARE

## 2017-05-07 ENCOUNTER — HOSPITAL ENCOUNTER (INPATIENT)
Facility: MEDICAL CENTER | Age: 67
LOS: 3 days | DRG: 492 | End: 2017-05-10
Attending: EMERGENCY MEDICINE | Admitting: HOSPITALIST
Payer: MEDICARE

## 2017-05-07 ENCOUNTER — RESOLUTE PROFESSIONAL BILLING HOSPITAL PROF FEE (OUTPATIENT)
Dept: HOSPITALIST | Facility: MEDICAL CENTER | Age: 67
End: 2017-05-07
Payer: MEDICARE

## 2017-05-07 DIAGNOSIS — B35.1 TOENAIL FUNGUS: ICD-10-CM

## 2017-05-07 DIAGNOSIS — M47.817 LUMBOSACRAL SPONDYLOSIS WITHOUT MYELOPATHY: ICD-10-CM

## 2017-05-07 DIAGNOSIS — J18.9 CAP (COMMUNITY ACQUIRED PNEUMONIA): ICD-10-CM

## 2017-05-07 DIAGNOSIS — G89.29 CHRONIC KNEE PAIN, UNSPECIFIED LATERALITY: ICD-10-CM

## 2017-05-07 DIAGNOSIS — N18.9 ACUTE ON CHRONIC RENAL FAILURE (HCC): ICD-10-CM

## 2017-05-07 DIAGNOSIS — M79.641 BILATERAL HAND PAIN: ICD-10-CM

## 2017-05-07 DIAGNOSIS — J44.1 ACUTE EXACERBATION OF CHRONIC OBSTRUCTIVE PULMONARY DISEASE (COPD) (HCC): ICD-10-CM

## 2017-05-07 DIAGNOSIS — M47.16 LUMBAR SPONDYLOSIS WITH MYELOPATHY: ICD-10-CM

## 2017-05-07 DIAGNOSIS — A41.9 SEVERE SEPSIS WITH SEPTIC SHOCK (HCC): ICD-10-CM

## 2017-05-07 DIAGNOSIS — G25.3 MYOCLONIC JERKING: ICD-10-CM

## 2017-05-07 DIAGNOSIS — E87.5 HYPERKALEMIA: ICD-10-CM

## 2017-05-07 DIAGNOSIS — G47.33 OSA (OBSTRUCTIVE SLEEP APNEA): ICD-10-CM

## 2017-05-07 DIAGNOSIS — G62.9 NEUROPATHY: ICD-10-CM

## 2017-05-07 DIAGNOSIS — J18.9 HCAP (HEALTHCARE-ASSOCIATED PNEUMONIA): ICD-10-CM

## 2017-05-07 DIAGNOSIS — J96.11 CHRONIC RESPIRATORY FAILURE WITH HYPOXIA (HCC): ICD-10-CM

## 2017-05-07 DIAGNOSIS — K59.09 CHRONIC CONSTIPATION: ICD-10-CM

## 2017-05-07 DIAGNOSIS — M79.642 BILATERAL HAND PAIN: ICD-10-CM

## 2017-05-07 DIAGNOSIS — J96.21 ACUTE ON CHRONIC RESPIRATORY FAILURE WITH HYPOXIA AND HYPERCAPNIA (HCC): ICD-10-CM

## 2017-05-07 DIAGNOSIS — S82.202A TIBIA/FIBULA FRACTURE, LEFT, CLOSED, INITIAL ENCOUNTER: ICD-10-CM

## 2017-05-07 DIAGNOSIS — N17.9 AKI (ACUTE KIDNEY INJURY) (HCC): ICD-10-CM

## 2017-05-07 DIAGNOSIS — S82.402A TIBIA/FIBULA FRACTURE, LEFT, CLOSED, INITIAL ENCOUNTER: ICD-10-CM

## 2017-05-07 DIAGNOSIS — I10 ESSENTIAL HYPERTENSION: ICD-10-CM

## 2017-05-07 DIAGNOSIS — J43.9 PULMONARY EMPHYSEMA, UNSPECIFIED EMPHYSEMA TYPE (HCC): ICD-10-CM

## 2017-05-07 DIAGNOSIS — M19.041 PRIMARY OSTEOARTHRITIS OF BOTH HANDS: ICD-10-CM

## 2017-05-07 DIAGNOSIS — Z72.0 TOBACCO ABUSE DISORDER: ICD-10-CM

## 2017-05-07 DIAGNOSIS — E80.6 HYPERBILIRUBINEMIA: ICD-10-CM

## 2017-05-07 DIAGNOSIS — R91.8 PULMONARY NODULES: ICD-10-CM

## 2017-05-07 DIAGNOSIS — E55.9 VITAMIN D DEFICIENCY: ICD-10-CM

## 2017-05-07 DIAGNOSIS — G89.4 CHRONIC PAIN SYNDROME: ICD-10-CM

## 2017-05-07 DIAGNOSIS — M19.042 PRIMARY OSTEOARTHRITIS OF BOTH HANDS: ICD-10-CM

## 2017-05-07 DIAGNOSIS — G89.29 CHRONIC PAIN OF RIGHT KNEE: ICD-10-CM

## 2017-05-07 DIAGNOSIS — Z79.891 ENCOUNTER FOR LONG-TERM OPIATE ANALGESIC USE: ICD-10-CM

## 2017-05-07 DIAGNOSIS — M25.561 CHRONIC PAIN OF RIGHT KNEE: ICD-10-CM

## 2017-05-07 DIAGNOSIS — F51.01 PRIMARY INSOMNIA: ICD-10-CM

## 2017-05-07 DIAGNOSIS — J44.9 CHRONIC OBSTRUCTIVE PULMONARY DISEASE, UNSPECIFIED COPD TYPE (HCC): ICD-10-CM

## 2017-05-07 DIAGNOSIS — G47.34 NOCTURNAL HYPOXIA: ICD-10-CM

## 2017-05-07 DIAGNOSIS — Z79.891 CHRONIC USE OF OPIATE DRUGS THERAPEUTIC PURPOSES: ICD-10-CM

## 2017-05-07 DIAGNOSIS — J96.22 ACUTE ON CHRONIC RESPIRATORY FAILURE WITH HYPOXIA AND HYPERCAPNIA (HCC): ICD-10-CM

## 2017-05-07 DIAGNOSIS — M25.569 CHRONIC KNEE PAIN, UNSPECIFIED LATERALITY: ICD-10-CM

## 2017-05-07 DIAGNOSIS — K21.00 GASTROESOPHAGEAL REFLUX DISEASE WITH ESOPHAGITIS: ICD-10-CM

## 2017-05-07 DIAGNOSIS — I11.0 HYPERTENSIVE HEART DISEASE WITH HEART FAILURE (HCC): ICD-10-CM

## 2017-05-07 DIAGNOSIS — R79.89 ELEVATED TROPONIN: ICD-10-CM

## 2017-05-07 DIAGNOSIS — J18.9 PNEUMONIA OF RIGHT LOWER LOBE DUE TO INFECTIOUS ORGANISM: ICD-10-CM

## 2017-05-07 DIAGNOSIS — G93.40 ENCEPHALOPATHY: ICD-10-CM

## 2017-05-07 DIAGNOSIS — M17.0 PRIMARY OSTEOARTHRITIS OF BOTH KNEES: ICD-10-CM

## 2017-05-07 DIAGNOSIS — R65.21 SEVERE SEPSIS WITH SEPTIC SHOCK (HCC): ICD-10-CM

## 2017-05-07 DIAGNOSIS — D50.9 IRON DEFICIENCY ANEMIA, UNSPECIFIED IRON DEFICIENCY ANEMIA TYPE: ICD-10-CM

## 2017-05-07 DIAGNOSIS — E66.01 MORBID OBESITY DUE TO EXCESS CALORIES (HCC): ICD-10-CM

## 2017-05-07 DIAGNOSIS — M47.26 OSTEOARTHRITIS OF SPINE WITH RADICULOPATHY, LUMBAR REGION: ICD-10-CM

## 2017-05-07 DIAGNOSIS — E03.9 HYPOTHYROIDISM, UNSPECIFIED TYPE: ICD-10-CM

## 2017-05-07 DIAGNOSIS — M79.671 PAIN OF RIGHT HEEL: ICD-10-CM

## 2017-05-07 DIAGNOSIS — N17.9 ACUTE ON CHRONIC RENAL FAILURE (HCC): ICD-10-CM

## 2017-05-07 DIAGNOSIS — R05.9 COUGH: ICD-10-CM

## 2017-05-07 DIAGNOSIS — N18.2 CKD (CHRONIC KIDNEY DISEASE), STAGE II: ICD-10-CM

## 2017-05-07 DIAGNOSIS — E87.1 HYPONATREMIA: ICD-10-CM

## 2017-05-07 DIAGNOSIS — N18.30 CHRONIC KIDNEY DISEASE, STAGE 3 (HCC): ICD-10-CM

## 2017-05-07 DIAGNOSIS — I50.33 ACUTE ON CHRONIC DIASTOLIC CHF (CONGESTIVE HEART FAILURE), NYHA CLASS 1 (HCC): ICD-10-CM

## 2017-05-07 DIAGNOSIS — L65.9 HAIR LOSS: ICD-10-CM

## 2017-05-07 PROBLEM — S82.409A TIBIA/FIBULA FRACTURE: Status: ACTIVE | Noted: 2017-05-07

## 2017-05-07 PROBLEM — S82.209A TIBIA/FIBULA FRACTURE: Status: ACTIVE | Noted: 2017-05-07

## 2017-05-07 LAB
ABO GROUP BLD: NORMAL
ALBUMIN SERPL BCP-MCNC: 3.1 G/DL (ref 3.2–4.9)
ALBUMIN/GLOB SERPL: 1.2 G/DL
ALP SERPL-CCNC: 40 U/L (ref 30–99)
ALT SERPL-CCNC: 15 U/L (ref 2–50)
ANION GAP SERPL CALC-SCNC: 9 MMOL/L (ref 0–11.9)
APTT PPP: 29.6 SEC (ref 24.7–36)
AST SERPL-CCNC: 18 U/L (ref 12–45)
BARCODED ABORH UBTYP: 9500
BARCODED PRD CODE UBPRD: NORMAL
BARCODED UNIT NUM UBUNT: NORMAL
BASOPHILS # BLD AUTO: 0.3 % (ref 0–1.8)
BASOPHILS # BLD: 0.04 K/UL (ref 0–0.12)
BILIRUB SERPL-MCNC: 0.5 MG/DL (ref 0.1–1.5)
BLD GP AB SCN SERPL QL: NORMAL
BNP SERPL-MCNC: 160 PG/ML (ref 0–100)
BUN SERPL-MCNC: 21 MG/DL (ref 8–22)
CALCIUM SERPL-MCNC: 8.7 MG/DL (ref 8.5–10.5)
CHLORIDE SERPL-SCNC: 100 MMOL/L (ref 96–112)
CO2 SERPL-SCNC: 30 MMOL/L (ref 20–33)
COMPONENT R 8504R: NORMAL
CREAT SERPL-MCNC: 1.51 MG/DL (ref 0.5–1.4)
EOSINOPHIL # BLD AUTO: 0.02 K/UL (ref 0–0.51)
EOSINOPHIL NFR BLD: 0.2 % (ref 0–6.9)
ERYTHROCYTE [DISTWIDTH] IN BLOOD BY AUTOMATED COUNT: 62.7 FL (ref 35.9–50)
GFR SERPL CREATININE-BSD FRML MDRD: 34 ML/MIN/1.73 M 2
GLOBULIN SER CALC-MCNC: 2.5 G/DL (ref 1.9–3.5)
GLUCOSE SERPL-MCNC: 207 MG/DL (ref 65–99)
HCT VFR BLD AUTO: 25.4 % (ref 37–47)
HGB BLD-MCNC: 7.8 G/DL (ref 12–16)
IMM GRANULOCYTES # BLD AUTO: 0.1 K/UL (ref 0–0.11)
IMM GRANULOCYTES NFR BLD AUTO: 0.8 % (ref 0–0.9)
INR PPP: 1.03 (ref 0.87–1.13)
LYMPHOCYTES # BLD AUTO: 0.83 K/UL (ref 1–4.8)
LYMPHOCYTES NFR BLD: 6.6 % (ref 22–41)
MCH RBC QN AUTO: 28.6 PG (ref 27–33)
MCHC RBC AUTO-ENTMCNC: 30.7 G/DL (ref 33.6–35)
MCV RBC AUTO: 93 FL (ref 81.4–97.8)
MONOCYTES # BLD AUTO: 0.64 K/UL (ref 0–0.85)
MONOCYTES NFR BLD AUTO: 5.1 % (ref 0–13.4)
NEUTROPHILS # BLD AUTO: 10.9 K/UL (ref 2–7.15)
NEUTROPHILS NFR BLD: 87 % (ref 44–72)
NRBC # BLD AUTO: 0.02 K/UL
NRBC BLD AUTO-RTO: 0.2 /100 WBC
PLATELET # BLD AUTO: 258 K/UL (ref 164–446)
PMV BLD AUTO: 8.6 FL (ref 9–12.9)
POTASSIUM SERPL-SCNC: 4.5 MMOL/L (ref 3.6–5.5)
PRODUCT TYPE UPROD: NORMAL
PROT SERPL-MCNC: 5.6 G/DL (ref 6–8.2)
PROTHROMBIN TIME: 13.8 SEC (ref 12–14.6)
RBC # BLD AUTO: 2.73 M/UL (ref 4.2–5.4)
RH BLD: NORMAL
SODIUM SERPL-SCNC: 139 MMOL/L (ref 135–145)
TROPONIN I SERPL-MCNC: <0.01 NG/ML (ref 0–0.04)
UNIT STATUS USTAT: NORMAL
WBC # BLD AUTO: 12.5 K/UL (ref 4.8–10.8)

## 2017-05-07 PROCEDURE — 30233N1 TRANSFUSION OF NONAUTOLOGOUS RED BLOOD CELLS INTO PERIPHERAL VEIN, PERCUTANEOUS APPROACH: ICD-10-PCS | Performed by: EMERGENCY MEDICINE

## 2017-05-07 PROCEDURE — 99223 1ST HOSP IP/OBS HIGH 75: CPT | Mod: AI | Performed by: HOSPITALIST

## 2017-05-07 PROCEDURE — 85025 COMPLETE CBC W/AUTO DIFF WBC: CPT

## 2017-05-07 PROCEDURE — 73590 X-RAY EXAM OF LOWER LEG: CPT | Mod: LT

## 2017-05-07 PROCEDURE — 94760 N-INVAS EAR/PLS OXIMETRY 1: CPT

## 2017-05-07 PROCEDURE — 0QSHXZZ REPOSITION LEFT TIBIA, EXTERNAL APPROACH: ICD-10-PCS | Performed by: EMERGENCY MEDICINE

## 2017-05-07 PROCEDURE — A9270 NON-COVERED ITEM OR SERVICE: HCPCS | Performed by: HOSPITALIST

## 2017-05-07 PROCEDURE — 700105 HCHG RX REV CODE 258: Performed by: EMERGENCY MEDICINE

## 2017-05-07 PROCEDURE — 96365 THER/PROPH/DIAG IV INF INIT: CPT

## 2017-05-07 PROCEDURE — 85730 THROMBOPLASTIN TIME PARTIAL: CPT

## 2017-05-07 PROCEDURE — 71010 DX-CHEST-PORTABLE (1 VIEW): CPT

## 2017-05-07 PROCEDURE — 86850 RBC ANTIBODY SCREEN: CPT

## 2017-05-07 PROCEDURE — 99285 EMERGENCY DEPT VISIT HI MDM: CPT

## 2017-05-07 PROCEDURE — 93005 ELECTROCARDIOGRAM TRACING: CPT | Performed by: EMERGENCY MEDICINE

## 2017-05-07 PROCEDURE — 0QSKXZZ REPOSITION LEFT FIBULA, EXTERNAL APPROACH: ICD-10-PCS | Performed by: EMERGENCY MEDICINE

## 2017-05-07 PROCEDURE — 304562 HCHG STAT O2 MASK/CANNULA

## 2017-05-07 PROCEDURE — 85610 PROTHROMBIN TIME: CPT

## 2017-05-07 PROCEDURE — 87040 BLOOD CULTURE FOR BACTERIA: CPT | Mod: 91

## 2017-05-07 PROCEDURE — 86900 BLOOD TYPING SEROLOGIC ABO: CPT

## 2017-05-07 PROCEDURE — 94640 AIRWAY INHALATION TREATMENT: CPT

## 2017-05-07 PROCEDURE — 86901 BLOOD TYPING SEROLOGIC RH(D): CPT

## 2017-05-07 PROCEDURE — 80053 COMPREHEN METABOLIC PANEL: CPT

## 2017-05-07 PROCEDURE — 84484 ASSAY OF TROPONIN QUANT: CPT

## 2017-05-07 PROCEDURE — 770020 HCHG ROOM/CARE - TELE (206)

## 2017-05-07 PROCEDURE — 96361 HYDRATE IV INFUSION ADD-ON: CPT

## 2017-05-07 PROCEDURE — 700101 HCHG RX REV CODE 250: Performed by: EMERGENCY MEDICINE

## 2017-05-07 PROCEDURE — 700111 HCHG RX REV CODE 636 W/ 250 OVERRIDE (IP): Performed by: EMERGENCY MEDICINE

## 2017-05-07 PROCEDURE — 700102 HCHG RX REV CODE 250 W/ 637 OVERRIDE(OP): Performed by: HOSPITALIST

## 2017-05-07 PROCEDURE — 700111 HCHG RX REV CODE 636 W/ 250 OVERRIDE (IP): Performed by: HOSPITALIST

## 2017-05-07 PROCEDURE — 83880 ASSAY OF NATRIURETIC PEPTIDE: CPT

## 2017-05-07 RX ORDER — POLYETHYLENE GLYCOL 3350 17 G/17G
1 POWDER, FOR SOLUTION ORAL
Status: DISCONTINUED | OUTPATIENT
Start: 2017-05-07 | End: 2017-05-08

## 2017-05-07 RX ORDER — CYCLOBENZAPRINE HCL 10 MG
10 TABLET ORAL 3 TIMES DAILY PRN
Status: DISCONTINUED | OUTPATIENT
Start: 2017-05-07 | End: 2017-05-10 | Stop reason: HOSPADM

## 2017-05-07 RX ORDER — DOXYCYCLINE 100 MG/1
100 TABLET ORAL EVERY 12 HOURS
Status: DISCONTINUED | OUTPATIENT
Start: 2017-05-08 | End: 2017-05-08

## 2017-05-07 RX ORDER — SODIUM CHLORIDE 9 MG/ML
1000 INJECTION, SOLUTION INTRAVENOUS ONCE
Status: COMPLETED | OUTPATIENT
Start: 2017-05-07 | End: 2017-05-07

## 2017-05-07 RX ORDER — LEVOFLOXACIN 5 MG/ML
750 INJECTION, SOLUTION INTRAVENOUS EVERY 24 HOURS
Status: DISCONTINUED | OUTPATIENT
Start: 2017-05-07 | End: 2017-05-08

## 2017-05-07 RX ORDER — AMPICILLIN AND SULBACTAM 2; 1 G/1; G/1
3 INJECTION, POWDER, FOR SOLUTION INTRAMUSCULAR; INTRAVENOUS ONCE
Status: DISCONTINUED | OUTPATIENT
Start: 2017-05-07 | End: 2017-05-07

## 2017-05-07 RX ORDER — DULOXETIN HYDROCHLORIDE 60 MG/1
60 CAPSULE, DELAYED RELEASE ORAL DAILY
Status: DISCONTINUED | OUTPATIENT
Start: 2017-05-08 | End: 2017-05-10 | Stop reason: HOSPADM

## 2017-05-07 RX ORDER — MORPHINE SULFATE 4 MG/ML
4 INJECTION, SOLUTION INTRAMUSCULAR; INTRAVENOUS
Status: DISCONTINUED | OUTPATIENT
Start: 2017-05-07 | End: 2017-05-08

## 2017-05-07 RX ORDER — GABAPENTIN 300 MG/1
600 CAPSULE ORAL 3 TIMES DAILY
Status: DISCONTINUED | OUTPATIENT
Start: 2017-05-07 | End: 2017-05-10 | Stop reason: HOSPADM

## 2017-05-07 RX ORDER — MORPHINE SULFATE 4 MG/ML
2 INJECTION, SOLUTION INTRAMUSCULAR; INTRAVENOUS
Status: DISCONTINUED | OUTPATIENT
Start: 2017-05-07 | End: 2017-05-07

## 2017-05-07 RX ORDER — AMOXICILLIN 250 MG
2 CAPSULE ORAL 2 TIMES DAILY
Status: DISCONTINUED | OUTPATIENT
Start: 2017-05-07 | End: 2017-05-08

## 2017-05-07 RX ORDER — PREDNISONE 20 MG/1
40 TABLET ORAL DAILY
Status: DISCONTINUED | OUTPATIENT
Start: 2017-05-08 | End: 2017-05-10 | Stop reason: HOSPADM

## 2017-05-07 RX ORDER — OXYCODONE HYDROCHLORIDE 10 MG/1
20 TABLET ORAL EVERY 4 HOURS PRN
Status: DISCONTINUED | OUTPATIENT
Start: 2017-05-07 | End: 2017-05-08

## 2017-05-07 RX ORDER — TIOTROPIUM BROMIDE 18 UG/1
1 CAPSULE ORAL; RESPIRATORY (INHALATION) DAILY
Status: DISCONTINUED | OUTPATIENT
Start: 2017-05-08 | End: 2017-05-10 | Stop reason: HOSPADM

## 2017-05-07 RX ORDER — OMEPRAZOLE 20 MG/1
20 CAPSULE, DELAYED RELEASE ORAL DAILY
Status: DISCONTINUED | OUTPATIENT
Start: 2017-05-08 | End: 2017-05-10 | Stop reason: HOSPADM

## 2017-05-07 RX ORDER — LEVOTHYROXINE SODIUM 0.07 MG/1
75 TABLET ORAL
Status: DISCONTINUED | OUTPATIENT
Start: 2017-05-08 | End: 2017-05-10 | Stop reason: HOSPADM

## 2017-05-07 RX ORDER — SIMVASTATIN 10 MG
10 TABLET ORAL EVERY EVENING
Status: DISCONTINUED | OUTPATIENT
Start: 2017-05-07 | End: 2017-05-10 | Stop reason: HOSPADM

## 2017-05-07 RX ORDER — SODIUM CHLORIDE 9 MG/ML
INJECTION, SOLUTION INTRAVENOUS CONTINUOUS
Status: DISCONTINUED | OUTPATIENT
Start: 2017-05-07 | End: 2017-05-07

## 2017-05-07 RX ORDER — AZITHROMYCIN 500 MG/1
500 INJECTION, POWDER, LYOPHILIZED, FOR SOLUTION INTRAVENOUS ONCE
Status: DISCONTINUED | OUTPATIENT
Start: 2017-05-07 | End: 2017-05-07

## 2017-05-07 RX ORDER — OXYCODONE HYDROCHLORIDE 10 MG/1
10 TABLET ORAL EVERY 4 HOURS PRN
Status: DISCONTINUED | OUTPATIENT
Start: 2017-05-07 | End: 2017-05-08

## 2017-05-07 RX ORDER — INSULIN GLARGINE 100 [IU]/ML
20 INJECTION, SOLUTION SUBCUTANEOUS NIGHTLY
Status: DISCONTINUED | OUTPATIENT
Start: 2017-05-07 | End: 2017-05-10 | Stop reason: HOSPADM

## 2017-05-07 RX ORDER — TRAZODONE HYDROCHLORIDE 100 MG/1
300 TABLET ORAL
Status: DISCONTINUED | OUTPATIENT
Start: 2017-05-07 | End: 2017-05-10 | Stop reason: HOSPADM

## 2017-05-07 RX ORDER — ACETAMINOPHEN 325 MG/1
650 TABLET ORAL EVERY 6 HOURS PRN
Status: DISCONTINUED | OUTPATIENT
Start: 2017-05-07 | End: 2017-05-10 | Stop reason: HOSPADM

## 2017-05-07 RX ORDER — OXYCODONE HYDROCHLORIDE 5 MG/1
10-20 TABLET ORAL EVERY 4 HOURS PRN
Status: DISCONTINUED | OUTPATIENT
Start: 2017-05-07 | End: 2017-05-07

## 2017-05-07 RX ORDER — OXYCODONE HYDROCHLORIDE 5 MG/1
5 TABLET ORAL
Status: DISCONTINUED | OUTPATIENT
Start: 2017-05-07 | End: 2017-05-07

## 2017-05-07 RX ORDER — MONTELUKAST SODIUM 10 MG/1
10 TABLET ORAL DAILY
Status: DISCONTINUED | OUTPATIENT
Start: 2017-05-08 | End: 2017-05-10 | Stop reason: HOSPADM

## 2017-05-07 RX ORDER — BUDESONIDE AND FORMOTEROL FUMARATE DIHYDRATE 160; 4.5 UG/1; UG/1
2 AEROSOL RESPIRATORY (INHALATION) 2 TIMES DAILY
Status: DISCONTINUED | OUTPATIENT
Start: 2017-05-08 | End: 2017-05-10 | Stop reason: HOSPADM

## 2017-05-07 RX ORDER — BISACODYL 10 MG
10 SUPPOSITORY, RECTAL RECTAL
Status: DISCONTINUED | OUTPATIENT
Start: 2017-05-07 | End: 2017-05-08

## 2017-05-07 RX ORDER — DEXTROSE MONOHYDRATE 25 G/50ML
25 INJECTION, SOLUTION INTRAVENOUS
Status: DISCONTINUED | OUTPATIENT
Start: 2017-05-07 | End: 2017-05-10 | Stop reason: HOSPADM

## 2017-05-07 RX ORDER — OXYCODONE HYDROCHLORIDE 5 MG/1
2.5 TABLET ORAL
Status: DISCONTINUED | OUTPATIENT
Start: 2017-05-07 | End: 2017-05-07

## 2017-05-07 RX ORDER — LEVOFLOXACIN 5 MG/ML
750 INJECTION, SOLUTION INTRAVENOUS ONCE
Status: DISCONTINUED | OUTPATIENT
Start: 2017-05-07 | End: 2017-05-07

## 2017-05-07 RX ORDER — IPRATROPIUM BROMIDE AND ALBUTEROL SULFATE 2.5; .5 MG/3ML; MG/3ML
3 SOLUTION RESPIRATORY (INHALATION)
Status: COMPLETED | OUTPATIENT
Start: 2017-05-07 | End: 2017-05-07

## 2017-05-07 RX ADMIN — LEVOFLOXACIN 750 MG: 750 INJECTION, SOLUTION INTRAVENOUS at 22:03

## 2017-05-07 RX ADMIN — SODIUM CHLORIDE 1000 ML: 900 INJECTION, SOLUTION INTRAVENOUS at 20:31

## 2017-05-07 RX ADMIN — OXYCODONE HYDROCHLORIDE 5 MG: 5 TABLET ORAL at 22:56

## 2017-05-07 RX ADMIN — FENTANYL CITRATE 100 MCG: 50 INJECTION INTRAMUSCULAR; INTRAVENOUS at 20:58

## 2017-05-07 RX ADMIN — FENTANYL CITRATE 50 MCG: 50 INJECTION INTRAMUSCULAR; INTRAVENOUS at 20:18

## 2017-05-07 RX ADMIN — IPRATROPIUM BROMIDE AND ALBUTEROL SULFATE 3 ML: .5; 3 SOLUTION RESPIRATORY (INHALATION) at 20:10

## 2017-05-07 ASSESSMENT — COPD QUESTIONNAIRES
HAVE YOU SMOKED AT LEAST 100 CIGARETTES IN YOUR ENTIRE LIFE: YES
DURING THE PAST 4 WEEKS HOW MUCH DID YOU FEEL SHORT OF BREATH: SOME OF THE TIME
DO YOU EVER COUGH UP ANY MUCUS OR PHLEGM?: YES, EVERY DAY
COPD SCREENING SCORE: 9

## 2017-05-07 ASSESSMENT — PAIN SCALES - GENERAL
PAINLEVEL_OUTOF10: 9
PAINLEVEL_OUTOF10: 8
PAINLEVEL_OUTOF10: 9

## 2017-05-07 ASSESSMENT — LIFESTYLE VARIABLES: EVER_SMOKED: YES

## 2017-05-07 NOTE — IP AVS SNAPSHOT
Xinrong Access Code: [email or web address]  Expires: 6/3/2017  2:30 PM    Your email address is not on file at [facility].  Email Addresses are required for you to sign up for [employer], please contact [phone] to verify your personal information and to provide your email address prior to attempting to register for Xinrong.    Priya Burt London  [address] [place] [address]    Xinrong  A secure, online tool to manage your health information     [facility]’s Xinrong® is a secure, online tool that connects you to your personalized health information from the privacy of your home -- day or night - making it very easy for you to manage your healthcare. Once the activation process is completed, you can even access your medical information using the Xinrong mercy, which is available for free in the [employer] store or Google Play store.     To learn more about Xinrong, visit [email or web address]    There are two levels of access available (as shown below):   My Chart Features  [facility] Primary Care Doctor [facility]  Specialists [facility]  Urgent  Care Non-[facility] Primary Care Doctor   Email your healthcare team securely and privately 24/7 X X X    Manage appointments: schedule your next appointment; view details of past/upcoming appointments X      Request prescription refills. X      View recent personal medical records, including lab and immunizations X X X X   View health record, including health history, allergies, medications X X X X   Read reports about your outpatient visits, procedures, consult and ER notes X X X X   See your discharge summary, which is a recap of your hospital and/or ER visit that includes your diagnosis, lab results, and care plan X X  X     How to register for [employer]:  Once your e-mail address has been verified, follow the following steps to sign up for [employer].     1. Go to  [email or web address]  2. Click on the Sign Up Now box, which takes you to the New Member Sign Up page.  You will need to provide the following information:  a. Enter your [employer] Access Code exactly as it appears at the top of this page. (You will not need to use this code after you’ve completed the sign-up process. If you do not sign up before the expiration date, you must request a new code.)   b. Enter your date of birth.   c. Enter your home email address.   d. Click Submit, and follow the next screen’s instructions.  3. Create a [employer] ID. This will be your [employer] login ID and cannot be changed, so think of one that is secure and easy to remember.  4. Create a [employer] password. You can change your password at any time.  5. Enter your Password Reset Question and Answer. This can be used at a later time if you forget your password.   6. Enter your e-mail address. This allows you to receive e-mail notifications when new information is available in [employer].  7. Click Sign Up. You can now view your health information.    For assistance activating your [employer] account, call [phone]

## 2017-05-07 NOTE — IP AVS SNAPSHOT
" <p align=\"LEFT\"><IMG SRC=\"//[email or web address]$/[email or web address]\" alt=\"Image\" WIDTH=\"50%\" HEIGHT=\"200\" BORDER=\"\"></p>                   Name:Priya Callahan  Medical Record Number:[id number]  CSN: [id number]    YOB: 1950   Age: 66 y.o.  Sex: female  HT:1.626 m (5' 4.02\") WT: 104.5 kg (230 lb 6.1 oz)          Admit Date: 5/7/2017     Discharge Date:   Today's Date: 5/10/2017  Attending Doctor:  Byron Ward M.D.                  Allergies:  Vitamin c; Keflex; Codeine; Lyrica; and Sudafed          Your appointments     May 11, 2017  8:00 AM   Adult Draw/Collection with LAB SKILLED NURSING   LAB - SKILLED NURSING (--)    [address] [facility] [address] [phone]            May 31, 2017 10:30 AM   Telemedicine Clinic New Patient with Dragan Vela M.D., [facility] [place], TELEMED CARDIOLOGY -[facility]   [facility] ([place])    [address] [facility] [place] [address] [phone]            Josh 15, 2017 11:20 AM   NEW TO YOU with Kavitha Mccall M.D.   [facility] ([place])    [address] [place] [address] [phone]            Jun 26, 2017 11:40 AM   Telemedicine Clinic Established Pt with [facility], [facility], [facility] [place]   Centralized Scheduling (--)    [address]  [place] [address] [phone]              Follow-up Information     1. Follow up with Mickie Lawson M.D. In 4 weeks.    Specialty:  Pediatrics    Contact information    [address] [place] [address] [phone]          2. Follow up with Rico Gtz M.D. In 2 weeks.    Specialty:  Orthopaedics    Contact information    [address]  [address]  [place] [address]  [phone]           Medication List      Take these Medications        Instructions    albuterol-ipratropium  MCG/ACT Aero   Commonly known as:  COMBIVENT    Inhale 2 Puffs by mouth every 6 hours as needed for Shortness of Breath.   "   Dose:  2 Puff       amlodipine 10 MG Tabs   Commonly known as:  NORVASC    Take 1 Tab by mouth every day.   Dose:  10 mg       APAP 325 MG Tabs    Take 2 Tabs by mouth every 6 hours as needed for Mild Pain or Fever.   Dose:  650 mg       budesonide-formoterol 160-4.5 MCG/ACT Aero   Commonly known as:  SYMBICORT    Inhale 2 Puffs by mouth 2 Times a Day.   Dose:  2 Puff       carvedilol 3.125 MG Tabs   Commonly known as:  COREG    Take 1 Tab by mouth 2 times a day, with meals.   Dose:  3.125 mg       celecoxib 200 MG Caps   Commonly known as:  CELEBREX    Take 1 Cap by mouth 2 times a day, with meals.   Dose:  200 mg       cyclobenzaprine 10 MG Tabs   Commonly known as:  FLEXERIL    TAKE 1 TABLET BY MOUTH THREE TIMES DAILY AS NEEDED FOR MILD PAIN        MG Caps    Take 100 mg by mouth 2 Times a Day.   Dose:  100 mg       duloxetine 60 MG Cpep delayed-release capsule   Commonly known as:  CYMBALTA    Take 1 Cap by mouth every day.   Dose:  60 mg       escitalopram 10 MG Tabs   Commonly known as:  LEXAPRO    Take 1 Tab by mouth every day.   Dose:  10 mg       ferrous gluconate 324 (38 FE) MG Tabs   Commonly known as:  FERGON    Take 1 Tab by mouth 2 times a day, with meals.   Dose:  324 mg       gabapentin 600 MG tablet   Commonly known as:  NEURONTIN    Take 1 Tab by mouth 3 times a day.   Dose:  600 mg       hydrocortisone 20 MG Tabs   Commonly known as:  CORTEF    Take 1 Tab by mouth every day.   Dose:  20 mg       insulin glargine 100 UNIT/ML Soln   Commonly known as:  LANTUS    Inject 20 Units as instructed every evening.   Dose:  20 Units       insulin lispro 100 UNIT/ML Soln   Commonly known as:  HUMALOG    Inject 2-9 Units as instructed 4 Times a Day,Before Meals and at Bedtime.   Dose:  2-9 Units       levofloxacin 500 MG tablet   Commonly known as:  LEVAQUIN    Take 1.5 Tabs by mouth every day for 3 days.   Dose:  750 mg       levothyroxine 75 MCG Tabs   Commonly known as:  SYNTHROID    TAKE 1 TABLET  BY MOUTH DAILY       montelukast 10 MG Tabs   Commonly known as:  SINGULAIR    Take 1 Tab by mouth every day.   Dose:  10 mg       multivitamin Tabs    Take 1 Tab by mouth every day.   Dose:  1 Tab       omeprazole 20 MG delayed-release capsule   Commonly known as:  PRILOSEC    Take 1 Cap by mouth every day.   Dose:  20 mg       polyethylene glycol/lytes Pack   Commonly known as:  MIRALAX    Take 1 Packet by mouth 2 times a day as needed (if sennosides and/or docusate ineffective or not ordered).   Dose:  17 g       scopolamine 1.5 MG/3DAYS Pt72   Commonly known as:  TRANSDERM-SCOP    Apply 1 Patch to skin as directed every 72 hours as needed (Nausea/Vomiting if ondansetron, dexamethasone, diphenhydramine, and/or haloperidol ineffective or not ordered).   Dose:  1 Patch       senna-docusate 8.6-50 MG Tabs   Commonly known as:  PERICOLACE or SENOKOT S    Take 1 Tab by mouth every day.   Dose:  1 Tab       simvastatin 10 MG Tabs   Commonly known as:  ZOCOR    TAKE 1 TABLET BY MOUTH EVERY EVENING       tiotropium 18 MCG Caps   Commonly known as:  SPIRIVA    Inhale 1 Cap by mouth every day.   Dose:  18 mcg       trazodone 100 MG Tabs   Commonly known as:  DESYREL    Doctor's comments:  PLEASE REPLY   TAKE 3 TABLETS BY MOUTH NIGHTLY AT BEDTIME AS NEEDED FOR SLEEP

## 2017-05-07 NOTE — IP AVS SNAPSHOT
" Home Care Instructions                                                                                                                  Name:Priya Callahan  Medical Record Number:[id number]  CSN: [id number]    YOB: 1950   Age: 66 y.o.  Sex: female  HT:1.626 m (5' 4.02\") WT: 104.5 kg (230 lb 6.1 oz)          Admit Date: 5/7/2017     Discharge Date:   Today's Date: 5/10/2017  Attending Doctor:  Byron Ward M.D.                  Allergies:  Vitamin c; Keflex; Codeine; Lyrica; and Sudafed            Discharge Instructions       Open Reduction, Internal Fixation (ORIF), Generic  Usually, if bones are broken (fractured) and are out of place, unstable, or may become out of place, surgery is needed. This surgery is called an open reduction and internal fixation (ORIF). Open reduction means that the area of the fracture is opened up so the surgeon can see it. Internal fixation means that screws, pins, or fixation devices are used to hold the bone pieces in place.  LET YOUR CAREGIVER KNOW ABOUT:   · Allergies.  · Medicines taken, including herbs, eyedrops, over-the-counter medicines, and creams.  · Use of steroids (by mouth or creams).  · Previous problems with anesthetics or numbing medicines.  · History of bleeding or blood problems.  · History of blood clots.  · Possibility of pregnancy, if this applies.  · Previous surgery.  · Other health problems.  RISKS AND COMPLICATIONS   All surgery is associated with risks. Some of these risks are:  · Excessive bleeding.  · Infection.  · Imperfect results with loss of joint function.  BEFORE THE PROCEDURE   Usually, surgery is performed shortly after the injury. It is important to provide information to your caregiver after your injury.  AFTER THE PROCEDURE   After surgery, you will be taken to a recovery area where a nurse will monitor your progress. You may have a long, narrow tube(catheter) in the bladder following surgery that helps you pass your water. When " awake, stable, taking fluids well, and without complications, you will be returned to your room. You will receive physical therapy and other care. Physical therapy is done until you are doing well and your caregiver feels it is safe for you to go home or to an extended care facility.  Following surgery, the bones may be protected with a cast. The type of casting depends on where the fracture was. Casts are generally left in place for about 5 to 6 weeks. During this time, your caregiver will follow your progress. X-rays may be taken during healing to make sure the bones stay in place.  HOME CARE INSTRUCTIONS   · You or your child may resume normal diet and activities as directed or allowed.  · Put ice on the injured area.  · Put ice in a plastic bag.  · Place a towel between the skin and the bag.  · Leave the ice on for 15-20 minutes at a time, 3-4 times a day, for the first 2 days following surgery.  · Change bandages (dressings) if necessary or as directed.  · If given a plaster or fiberglass cast:  · Do not try to scratch the skin under the cast using sharp or pointed objects.  · Check the skin around the cast every day. You may put lotion on any red or sore areas.  · Keep the cast dry and clean.  · Do not put pressure on any part of the cast or splint until it is fully hardened.  · The cast or splint can be protected during bathing with a plastic bag. Do not lower the cast or splint into water.  · Only take over-the-counter or prescription medicines for pain, discomfort, or fever as directed by your caregiver.  · Use crutches as directed and do not exercise the leg unless instructed.  · If the bones get out of position (displaced), it may eventually lead to arthritis and lasting disability. Problems can follow even the best of care. Follow the directions of your caregiver.  · Follow all instructions given by your caregiver, make and keep follow-up appointments, and use crutches as directed.  SEEK IMMEDIATE  MEDICAL CARE IF:   · There is redness, swelling, numbness, or increasing pain in the wound.  · There is pus coming from the wound.  · You or your child has an oral temperature above 102° F (38.9° C), not controlled by medicine.  · A bad smell is coming from the wound or dressing.  · The wound breaks open (edges not staying together) after stitches (sutures) or staples have been removed.  · The skin or nails below the injury turn blue or gray, or feel cold or numb.  · There is severe pain under the cast or in the foot.  If there is not a window in the cast for observing the wound, a discharge or minor bleeding may show up as a stain on the outside of the cast. Report these findings to your caregiver.  MAKE SURE YOU:   · Understand these instructions.  · Will watch your condition.  · Will get help right away if you are not doing well or gets worse.  Document Released: 12/29/2007 Document Revised: 03/11/2013 Document Reviewed: 12/05/2008  [employer]® Patient Information ©2014 [facility].  Discharge Instructions    [facility] escort. Discharged via wheelchair, hospital escort: Yes.  Special equipment needed: Not Applicable    Be sure to schedule a follow-up appointment with your primary care doctor or any specialists as instructed.     Discharge Plan:   Diet Plan: Discussed  Activity Level: Discussed  Confirmed Follow up Appointment: Patient to Call and Schedule Appointment  Confirmed Symptoms Management: Discussed  Medication Reconciliation Updated: Yes  Influenza Vaccine Indication: Not indicated: Previously immunized this influenza season and > 8 years of age    I understand that a diet low in cholesterol, fat, and sodium is recommended for good health. Unless I have been given specific instructions below for another diet, I accept this instruction as my diet prescription.   Other diet: heart healthy    Special Instructions: Discharge instructions for the Orthopedic  Patient    Follow up with Primary Care Physician within 2 weeks of discharge to home, regarding:  Review of medications and diagnostic testing.  Surveillance for medical complications.  Workup and treatment of osteoporosis, if appropriate.     -Is this a Joint Replacement patient? No    -Is this patient being discharged with medication to prevent blood clots?  No    · Is patient discharged on Warfarin / Coumadin?   No     · Is patient Post Blood Transfusion?  No    Depression / Suicide Risk    As you are discharged from this [facility], it is important to learn how to keep safe from harming yourself.    Recognize the warning signs:  · Abrupt changes in personality, positive or negative- including increase in energy   · Giving away possessions  · Change in eating patterns- significant weight changes-  positive or negative  · Change in sleeping patterns- unable to sleep or sleeping all the time   · Unwillingness or inability to communicate  · Depression  · Unusual sadness, discouragement and loneliness  · Talk of wanting to die  · Neglect of personal appearance   · Rebelliousness- reckless behavior  · Withdrawal from people/activities they love  · Confusion- inability to concentrate     If you or a loved one observes any of these behaviors or has concerns about self-harm, here's what you can do:  · Talk about it- your feelings and reasons for harming yourself  · Remove any means that you might use to hurt yourself (examples: pills, rope, extension cords, firearm)  · Get professional help from the community (Mental Health, Substance Abuse, psychological counseling)  · Do not be alone:Call your Safe Contact- someone whom you trust who will be there for you.  · Call your local CRISIS HOTLINE 739-5643 or 800-611-2202  · Call your local Children's Mobile Crisis Response Team [place] (340) 168-7834 or www.StreetFire  · Call the toll free National Suicide Prevention Hotlines   · National Suicide  Prevention Lifeline 424-356-IJKJ (4455)  · National [place] Line Network 800-SUICIDE (093-6445)        Your appointments     May 11, 2017  8:00 AM   Adult Draw/Collection with LAB SKILLED NURSING   LAB - SKILLED NURSING (--)    [address]  [facility] [address]   [phone]            May 31, 2017 10:30 AM   Telemedicine Clinic New Patient with Dragan Vela M.D., [facility] [place], TELEMED CARDIOLOGY -[facility] ([place])    [address] [address] [phone]            Josh 15, 2017 11:20 AM   NEW TO YOU with Kavitha Mccall M.D.   [facility] ([place])    [address] [address] [phone]            Jun 26, 2017 11:40 AM   Telemedicine Clinic Established Pt with [facility], [facility], [facility] [place]   Centralized Scheduling (--)    [address]  [facility] [id number] [phone]              Follow-up Information     1. Follow up with Mickie Lawson M.D. In 4 weeks.    Specialty:  Pediatrics    Contact information    [address] [place]  [place] [address] [phone]          2. Follow up with Rico Gtz M.D. In 2 weeks.    Specialty:  Orthopaedics    Contact information    [address]  [address]  [facility] [id number] [phone]           Discharge Medication Instructions:    Below are the medications your physician expects you to take upon discharge:    Review all your home medications and newly ordered medications with your doctor and/or pharmacist. Follow medication instructions as directed by your doctor and/or pharmacist.    Please keep your medication list with you and share with your physician.               Medication List      START taking these medications        Instructions    Morning Afternoon Evening Bedtime    APAP 325 MG Tabs   Last time this was given:  1,000 mg on 5/10/2017 12:00 PM        Take 2 Tabs by mouth every 6 hours as needed for  Mild Pain or Fever.   Dose:  650 mg                        budesonide-formoterol 160-4.5 MCG/ACT Aero   Last time this was given:  2 Puffs on 5/10/2017  8:32 AM   Commonly known as:  SYMBICORT        Inhale 2 Puffs by mouth 2 Times a Day.   Dose:  2 Puff                        celecoxib 200 MG Caps   Last time this was given:  200 mg on 5/10/2017  7:32 AM   Commonly known as:  CELEBREX        Take 1 Cap by mouth 2 times a day, with meals.   Dose:  200 mg                         MG Caps        Take 100 mg by mouth 2 Times a Day.   Dose:  100 mg                        insulin lispro 100 UNIT/ML Soln   Last time this was given:  2 Units on 5/9/2017 10:35 PM   Commonly known as:  HUMALOG        Inject 2-9 Units as instructed 4 Times a Day,Before Meals and at Bedtime.   Dose:  2-9 Units                        levofloxacin 500 MG tablet   Last time this was given:  750 mg on 5/9/2017 10:26 PM   Commonly known as:  LEVAQUIN        Take 1.5 Tabs by mouth every day for 3 days.   Dose:  750 mg                        omeprazole 20 MG delayed-release capsule   Last time this was given:  20 mg on 5/10/2017  8:32 AM   Commonly known as:  PRILOSEC        Take 1 Cap by mouth every day.   Dose:  20 mg                        polyethylene glycol/lytes Pack   Commonly known as:  MIRALAX        Take 1 Packet by mouth 2 times a day as needed (if sennosides and/or docusate ineffective or not ordered).   Dose:  17 g                        scopolamine 1.5 MG/3DAYS Pt72   Commonly known as:  TRANSDERM-SCOP        Apply 1 Patch to skin as directed every 72 hours as needed (Nausea/Vomiting if ondansetron, dexamethasone, diphenhydramine, and/or haloperidol ineffective or not ordered).   Dose:  1 Patch                        senna-docusate 8.6-50 MG Tabs   Commonly known as:  PERICOLACE or SENOKOT S        Take 1 Tab by mouth every day.   Dose:  1 Tab                          CONTINUE taking these medications        Instructions     Morning Afternoon Evening Bedtime    albuterol-ipratropium  MCG/ACT Aero   Commonly known as:  COMBIVENT        Inhale 2 Puffs by mouth every 6 hours as needed for Shortness of Breath.   Dose:  2 Puff                        amlodipine 10 MG Tabs   Commonly known as:  NORVASC        Take 1 Tab by mouth every day.   Dose:  10 mg                        carvedilol 3.125 MG Tabs   Commonly known as:  COREG        Take 1 Tab by mouth 2 times a day, with meals.   Dose:  3.125 mg                        cyclobenzaprine 10 MG Tabs   Last time this was given:  10 mg on 5/8/2017  8:39 PM   Commonly known as:  FLEXERIL        TAKE 1 TABLET BY MOUTH THREE TIMES DAILY AS NEEDED FOR MILD PAIN                        duloxetine 60 MG Cpep delayed-release capsule   Last time this was given:  60 mg on 5/10/2017  8:32 AM   Commonly known as:  CYMBALTA        Take 1 Cap by mouth every day.   Dose:  60 mg                        escitalopram 10 MG Tabs   Commonly known as:  LEXAPRO        Take 1 Tab by mouth every day.   Dose:  10 mg                        ferrous gluconate 324 (38 FE) MG Tabs   Commonly known as:  FERGON        Take 1 Tab by mouth 2 times a day, with meals.   Dose:  324 mg                        gabapentin 600 MG tablet   Commonly known as:  NEURONTIN        Take 1 Tab by mouth 3 times a day.   Dose:  600 mg                        hydrocortisone 20 MG Tabs   Commonly known as:  CORTEF        Take 1 Tab by mouth every day.   Dose:  20 mg                        insulin glargine 100 UNIT/ML Soln   Last time this was given:  20 Units on 5/9/2017 10:35 PM   Commonly known as:  LANTUS        Inject 20 Units as instructed every evening.   Dose:  20 Units                        levothyroxine 75 MCG Tabs   Last time this was given:  75 mcg on 5/10/2017  6:18 AM   Commonly known as:  SYNTHROID        TAKE 1 TABLET BY MOUTH DAILY                        montelukast 10 MG Tabs   Last time this was given:  10 mg on 5/10/2017   8:32 AM   Commonly known as:  SINGULAIR        Take 1 Tab by mouth every day.   Dose:  10 mg                        multivitamin Tabs        Take 1 Tab by mouth every day.   Dose:  1 Tab                        simvastatin 10 MG Tabs   Last time this was given:  10 mg on 5/9/2017 10:27 PM   Commonly known as:  ZOCOR        TAKE 1 TABLET BY MOUTH EVERY EVENING                        tiotropium 18 MCG Caps   Last time this was given:  1 Cap on 5/10/2017  8:32 AM   Commonly known as:  SPIRIVA        Inhale 1 Cap by mouth every day.   Dose:  18 mcg                        trazodone 100 MG Tabs   Last time this was given:  300 mg on 5/9/2017 10:27 PM   Commonly known as:  DESYREL        Doctor's comments:  PLEASE REPLY   TAKE 3 TABLETS BY MOUTH NIGHTLY AT BEDTIME AS NEEDED FOR SLEEP                          STOP taking these medications     oxycodone immediate release 10 MG immediate release tablet   Commonly known as:  ROXICODONE                    Where to Get Your Medications      Information about where to get these medications is not yet available     ! Ask your nurse or doctor about these medications    - APAP 325 MG Tabs  - budesonide-formoterol 160-4.5 MCG/ACT Aero  - celecoxib 200 MG Caps  -  MG Caps  - gabapentin 600 MG tablet  - insulin lispro 100 UNIT/ML Soln  - levofloxacin 500 MG tablet  - omeprazole 20 MG delayed-release capsule  - polyethylene glycol/lytes Pack  - scopolamine 1.5 MG/3DAYS Pt72  - senna-docusate 8.6-50 MG Tabs            Orders for after discharge     REFERRAL TO SKILLED NURSING FACILITY    Complete by:  As directed              Instructions           Diet / Nutrition:    Follow any diet instructions given to you by your doctor or the dietician, including how much salt (sodium) you are allowed each day.    If you are overweight, talk to your doctor about a weight reduction plan.    Activity:    Remain physically active following your doctor's instructions about exercise and  activity.    Rest often.     Any time you become even a little tired or short of breath, SIT DOWN and rest.    Worsening Symptoms:    Report any of the following signs and symptoms to the doctor's office immediately:    *Pain of jaw, arm, or neck  *Chest pain not relieved by medication                               *Dizziness or loss of consciousness  *Difficulty breathing even when at rest   *More tired than usual                                       *Bleeding drainage or swelling of surgical site  *Swelling of feet, ankles, legs or stomach                 *Fever (>100ºF)  *Pink or blood tinged sputum  *Weight gain (3lbs/day or 5lbs /week)           *Shock from internal defibrillator (if applicable)  *Palpitations or irregular heartbeats                *Cool and/or numb extremities    Stroke Awareness    Common Risk Factors for Stroke include:    Age  Atrial Fibrillation  Carotid Artery Stenosis  Diabetes Mellitus  Excessive alcohol consumption  High blood pressure  Overweight   Physical inactivity  Smoking    Warning signs and symptoms of a stroke include:    *Sudden numbness or weakness of the face, arm or leg (especially on one side of the body).  *Sudden confusion, trouble speaking or understanding.  *Sudden trouble seeing in one or both eyes.  *Sudden trouble walking, dizziness, loss of balance or coordination.Sudden severe headache with no known cause.    It is very important to get treatment quickly when a stroke occurs. If you experience any of the above warning signs, call 911 immediately.                   Disclaimer         Quit Smoking / Tobacco Use:    I understand the use of any tobacco products increases my chance of suffering from future heart disease or stroke and could cause other illnesses which may shorten my life. Quitting the use of tobacco products is the single most important thing I can do to improve my health. For further information on smoking / tobacco cessation call a Toll Free Quit  Line at 1-163.700.5689 (*National Cancer [place]) or 1-867.395.6143 (American Lung Association) or you can access the web based program at www.lungusa.org.    [place] Tobacco Users Help Line:  (237) 148-1255       Toll Free: 1-230.533.1831  Quit Tobacco Program [facility] (944)191-7460    Crisis Hotline:    [place] Crisis Hotline:  2-634-BUTSEHE or 1-876.373.6022    [place] Crisis Hotline:    1-226.791.7527 or 427-944-8625    Discharge Survey:   Thank you for choosing [facility]. We hope we did everything we could to make your hospital stay a pleasant one. You may be receiving a phone survey and we would appreciate your time and participation in answering the questions. Your input is very valuable to us in our efforts to improve our service to our patients and their families.        My signature on this form indicates that:    1. I have reviewed and understand the above information.  2. My questions regarding this information have been answered to my satisfaction.  3. I have formulated a plan with my discharge nurse to obtain my prescribed medications for home.                  Disclaimer         __________________________________                     __________       ________                       Patient Signature                                                 Date                    Time

## 2017-05-07 NOTE — IP AVS SNAPSHOT
5/10/2017    Priya Callahan  [address] [place] [address]    Dear Priya:    [facility] wants to ensure your discharge home is safe and you or your loved ones have had all of your questions answered regarding your care after you leave the hospital.    Below is a list of resources and contact information should you have any questions regarding your hospital stay, follow-up instructions, or active medical symptoms.    Questions or Concerns Regarding… Contact   Medical Questions Related to Your Discharge  (7 days a week, 8am-5pm) Contact a Nurse Care Coordinator   [phone]   Medical Questions Not Related to Your Discharge  (24 hours a day / 7 days a week)  Contact the Nurse Health Line   [phone]    Medications or Discharge Instructions Refer to your discharge packet   or contact your [facility] Primary Care Provider   [phone]   Follow-up Appointment(s) Schedule your appointment via [employer]   or contact Scheduling [phone]   Billing Review your statement via [employer]  or contact Billing [phone]   Medical Records Review your records via [employer]   or contact Medical Records [phone]     You may receive a telephone call within two days of discharge. This call is to make certain you understand your discharge instructions and have the opportunity to have any questions answered. You can also easily access your medical information, test results and upcoming appointments via the [employer] free online health management tool. You can learn more and sign up at [facility]/[employer]. For assistance setting up your [employer] account, please call [phone].    Once again, we want to ensure your discharge home is safe and that you have a clear understanding of any next steps in your care. If you have any questions or concerns, please do not hesitate to contact us, we are here for you. Thank you for choosing [facility] for your healthcare needs.    Sincerely,    Your [facility] Healthcare Team

## 2017-05-08 ENCOUNTER — APPOINTMENT (OUTPATIENT)
Dept: RADIOLOGY | Facility: MEDICAL CENTER | Age: 67
DRG: 492 | End: 2017-05-08
Attending: SURGERY
Payer: MEDICARE

## 2017-05-08 ENCOUNTER — APPOINTMENT (OUTPATIENT)
Dept: RADIOLOGY | Facility: MEDICAL CENTER | Age: 67
DRG: 492 | End: 2017-05-08
Attending: ORTHOPAEDIC SURGERY
Payer: MEDICARE

## 2017-05-08 PROBLEM — J18.9 PNEUMONIA: Status: ACTIVE | Noted: 2017-05-08

## 2017-05-08 PROBLEM — J44.1 ACUTE EXACERBATION OF CHRONIC OBSTRUCTIVE PULMONARY DISEASE (COPD) (HCC): Status: ACTIVE | Noted: 2017-05-08

## 2017-05-08 LAB
ANION GAP SERPL CALC-SCNC: 5 MMOL/L (ref 0–11.9)
BASOPHILS # BLD AUTO: 0.4 % (ref 0–1.8)
BASOPHILS # BLD: 0.05 K/UL (ref 0–0.12)
BUN SERPL-MCNC: 18 MG/DL (ref 8–22)
CALCIUM SERPL-MCNC: 8.5 MG/DL (ref 8.5–10.5)
CHLORIDE SERPL-SCNC: 103 MMOL/L (ref 96–112)
CO2 SERPL-SCNC: 32 MMOL/L (ref 20–33)
CREAT SERPL-MCNC: 1.52 MG/DL (ref 0.5–1.4)
EOSINOPHIL # BLD AUTO: 0.09 K/UL (ref 0–0.51)
EOSINOPHIL NFR BLD: 0.7 % (ref 0–6.9)
ERYTHROCYTE [DISTWIDTH] IN BLOOD BY AUTOMATED COUNT: 58 FL (ref 35.9–50)
GFR SERPL CREATININE-BSD FRML MDRD: 34 ML/MIN/1.73 M 2
GLUCOSE BLD-MCNC: 104 MG/DL (ref 65–99)
GLUCOSE BLD-MCNC: 152 MG/DL (ref 65–99)
GLUCOSE BLD-MCNC: 219 MG/DL (ref 65–99)
GLUCOSE BLD-MCNC: 371 MG/DL (ref 65–99)
GLUCOSE BLD-MCNC: 420 MG/DL (ref 65–99)
GLUCOSE SERPL-MCNC: 109 MG/DL (ref 65–99)
HCT VFR BLD AUTO: 26 % (ref 37–47)
HGB BLD-MCNC: 7.9 G/DL (ref 12–16)
IMM GRANULOCYTES # BLD AUTO: 0.12 K/UL (ref 0–0.11)
IMM GRANULOCYTES NFR BLD AUTO: 1 % (ref 0–0.9)
LYMPHOCYTES # BLD AUTO: 1.88 K/UL (ref 1–4.8)
LYMPHOCYTES NFR BLD: 15.2 % (ref 22–41)
MCH RBC QN AUTO: 28.7 PG (ref 27–33)
MCHC RBC AUTO-ENTMCNC: 30.4 G/DL (ref 33.6–35)
MCV RBC AUTO: 94.5 FL (ref 81.4–97.8)
MONOCYTES # BLD AUTO: 1.14 K/UL (ref 0–0.85)
MONOCYTES NFR BLD AUTO: 9.2 % (ref 0–13.4)
NEUTROPHILS # BLD AUTO: 9.05 K/UL (ref 2–7.15)
NEUTROPHILS NFR BLD: 73.5 % (ref 44–72)
NRBC # BLD AUTO: 0 K/UL
NRBC BLD AUTO-RTO: 0 /100 WBC
PLATELET # BLD AUTO: 250 K/UL (ref 164–446)
PMV BLD AUTO: 9 FL (ref 9–12.9)
POTASSIUM SERPL-SCNC: 3.9 MMOL/L (ref 3.6–5.5)
RBC # BLD AUTO: 2.75 M/UL (ref 4.2–5.4)
SODIUM SERPL-SCNC: 140 MMOL/L (ref 135–145)
WBC # BLD AUTO: 12.3 K/UL (ref 4.8–10.8)

## 2017-05-08 PROCEDURE — 501838 HCHG SUTURE GENERAL: Performed by: ORTHOPAEDIC SURGERY

## 2017-05-08 PROCEDURE — 36430 TRANSFUSION BLD/BLD COMPNT: CPT

## 2017-05-08 PROCEDURE — 96375 TX/PRO/DX INJ NEW DRUG ADDON: CPT

## 2017-05-08 PROCEDURE — 700101 HCHG RX REV CODE 250: Performed by: EMERGENCY MEDICINE

## 2017-05-08 PROCEDURE — 0QSK04Z REPOSITION LEFT FIBULA WITH INTERNAL FIXATION DEVICE, OPEN APPROACH: ICD-10-PCS | Performed by: ORTHOPAEDIC SURGERY

## 2017-05-08 PROCEDURE — C1713 ANCHOR/SCREW BN/BN,TIS/BN: HCPCS | Performed by: ORTHOPAEDIC SURGERY

## 2017-05-08 PROCEDURE — 82962 GLUCOSE BLOOD TEST: CPT | Mod: 91

## 2017-05-08 PROCEDURE — A9270 NON-COVERED ITEM OR SERVICE: HCPCS | Performed by: ORTHOPAEDIC SURGERY

## 2017-05-08 PROCEDURE — 700102 HCHG RX REV CODE 250 W/ 637 OVERRIDE(OP): Performed by: HOSPITALIST

## 2017-05-08 PROCEDURE — 700102 HCHG RX REV CODE 250 W/ 637 OVERRIDE(OP): Performed by: ORTHOPAEDIC SURGERY

## 2017-05-08 PROCEDURE — 160035 HCHG PACU - 1ST 60 MINS PHASE I: Performed by: ORTHOPAEDIC SURGERY

## 2017-05-08 PROCEDURE — 700105 HCHG RX REV CODE 258

## 2017-05-08 PROCEDURE — 501445 HCHG STAPLER, SKIN DISP: Performed by: ORTHOPAEDIC SURGERY

## 2017-05-08 PROCEDURE — 86923 COMPATIBILITY TEST ELECTRIC: CPT

## 2017-05-08 PROCEDURE — 99232 SBSQ HOSP IP/OBS MODERATE 35: CPT | Performed by: HOSPITALIST

## 2017-05-08 PROCEDURE — 160002 HCHG RECOVERY MINUTES (STAT): Performed by: ORTHOPAEDIC SURGERY

## 2017-05-08 PROCEDURE — 770020 HCHG ROOM/CARE - TELE (206)

## 2017-05-08 PROCEDURE — 85025 COMPLETE CBC W/AUTO DIFF WBC: CPT

## 2017-05-08 PROCEDURE — A9270 NON-COVERED ITEM OR SERVICE: HCPCS | Performed by: HOSPITALIST

## 2017-05-08 PROCEDURE — 700101 HCHG RX REV CODE 250

## 2017-05-08 PROCEDURE — 160009 HCHG ANES TIME/MIN: Performed by: ORTHOPAEDIC SURGERY

## 2017-05-08 PROCEDURE — 700111 HCHG RX REV CODE 636 W/ 250 OVERRIDE (IP): Performed by: HOSPITALIST

## 2017-05-08 PROCEDURE — 700111 HCHG RX REV CODE 636 W/ 250 OVERRIDE (IP): Performed by: ORTHOPAEDIC SURGERY

## 2017-05-08 PROCEDURE — 500881 HCHG PACK, EXTREMITY: Performed by: ORTHOPAEDIC SURGERY

## 2017-05-08 PROCEDURE — 700111 HCHG RX REV CODE 636 W/ 250 OVERRIDE (IP)

## 2017-05-08 PROCEDURE — 160041 HCHG SURGERY MINUTES - EA ADDL 1 MIN LEVEL 4: Performed by: ORTHOPAEDIC SURGERY

## 2017-05-08 PROCEDURE — 73600 X-RAY EXAM OF ANKLE: CPT | Mod: LT

## 2017-05-08 PROCEDURE — 160036 HCHG PACU - EA ADDL 30 MINS PHASE I: Performed by: ORTHOPAEDIC SURGERY

## 2017-05-08 PROCEDURE — 700102 HCHG RX REV CODE 250 W/ 637 OVERRIDE(OP)

## 2017-05-08 PROCEDURE — 160048 HCHG OR STATISTICAL LEVEL 1-5: Performed by: ORTHOPAEDIC SURGERY

## 2017-05-08 PROCEDURE — 99152 MOD SED SAME PHYS/QHP 5/>YRS: CPT

## 2017-05-08 PROCEDURE — 27840 TREAT ANKLE DISLOCATION: CPT

## 2017-05-08 PROCEDURE — 36415 COLL VENOUS BLD VENIPUNCTURE: CPT

## 2017-05-08 PROCEDURE — A9270 NON-COVERED ITEM OR SERVICE: HCPCS

## 2017-05-08 PROCEDURE — P9016 RBC LEUKOCYTES REDUCED: HCPCS

## 2017-05-08 PROCEDURE — 700105 HCHG RX REV CODE 258: Performed by: EMERGENCY MEDICINE

## 2017-05-08 PROCEDURE — 503036 HCHG GUIDE PIN,OIC: Performed by: ORTHOPAEDIC SURGERY

## 2017-05-08 PROCEDURE — 0QSH04Z REPOSITION LEFT TIBIA WITH INTERNAL FIXATION DEVICE, OPEN APPROACH: ICD-10-PCS | Performed by: ORTHOPAEDIC SURGERY

## 2017-05-08 PROCEDURE — 302128 INFUSION PUMP: Performed by: HOSPITALIST

## 2017-05-08 PROCEDURE — 160029 HCHG SURGERY MINUTES - 1ST 30 MINS LEVEL 4: Performed by: ORTHOPAEDIC SURGERY

## 2017-05-08 PROCEDURE — 500054 HCHG BANDAGE, ELASTIC 6: Performed by: ORTHOPAEDIC SURGERY

## 2017-05-08 PROCEDURE — 502240 HCHG MISC OR SUPPLY RC 0272: Performed by: ORTHOPAEDIC SURGERY

## 2017-05-08 PROCEDURE — 80048 BASIC METABOLIC PNL TOTAL CA: CPT

## 2017-05-08 DEVICE — SCREW 3.5 MM NON-LOCKING TI X 12MM LONG (6TX8+2TX5=58): Type: IMPLANTABLE DEVICE | Site: ANKLE | Status: FUNCTIONAL

## 2017-05-08 DEVICE — PLATE LOCKING 1/3 TUBULAR 5H (6TX2=12): Type: IMPLANTABLE DEVICE | Site: ANKLE | Status: FUNCTIONAL

## 2017-05-08 DEVICE — SCREW 3.5 MM NON-LOCKING TI X 10MM LONG (6TX8+2TX5=58): Type: IMPLANTABLE DEVICE | Site: ANKLE | Status: FUNCTIONAL

## 2017-05-08 DEVICE — SCREW 3.5 MM LOCKING TI X 10MM LONG (6TX8+2TX5=58): Type: IMPLANTABLE DEVICE | Site: ANKLE | Status: FUNCTIONAL

## 2017-05-08 DEVICE — PLATE LOCKING 1/3 TUBULAR 7H (6TX2=12): Type: IMPLANTABLE DEVICE | Site: ANKLE | Status: FUNCTIONAL

## 2017-05-08 DEVICE — SCREW 3.5 MM NON-LOCKING TI X 32MM LONG (6TX8=48): Type: IMPLANTABLE DEVICE | Site: ANKLE | Status: FUNCTIONAL

## 2017-05-08 DEVICE — SCREW CANN 4.0X30 SHORT OIC - (3TX3=9): Type: IMPLANTABLE DEVICE | Site: ANKLE | Status: FUNCTIONAL

## 2017-05-08 DEVICE — SCREW 3.5 MM LOCKING TI X 12MM LONG (6TX8+2TX5=58): Type: IMPLANTABLE DEVICE | Site: ANKLE | Status: FUNCTIONAL

## 2017-05-08 DEVICE — SCREW CANN 4.0X34 SHORT OIC - (3TX3=9): Type: IMPLANTABLE DEVICE | Site: ANKLE | Status: FUNCTIONAL

## 2017-05-08 DEVICE — SCREW 3.5 MM LOCKING TI X 30MM LONG (6TX8=48): Type: IMPLANTABLE DEVICE | Site: ANKLE | Status: FUNCTIONAL

## 2017-05-08 DEVICE — SCREW 3.5 MM LOCKING TI X 20MM LONG (6TX8=48): Type: IMPLANTABLE DEVICE | Site: ANKLE | Status: FUNCTIONAL

## 2017-05-08 DEVICE — SCREW 3.5 MM LOCKING TI X 26MM LONG (6TX8=48): Type: IMPLANTABLE DEVICE | Site: ANKLE | Status: FUNCTIONAL

## 2017-05-08 RX ORDER — SCOLOPAMINE TRANSDERMAL SYSTEM 1 MG/1
1 PATCH, EXTENDED RELEASE TRANSDERMAL
Status: DISCONTINUED | OUTPATIENT
Start: 2017-05-08 | End: 2017-05-10 | Stop reason: HOSPADM

## 2017-05-08 RX ORDER — DOCUSATE SODIUM 100 MG/1
100 CAPSULE, LIQUID FILLED ORAL 2 TIMES DAILY
Status: DISCONTINUED | OUTPATIENT
Start: 2017-05-08 | End: 2017-05-10 | Stop reason: HOSPADM

## 2017-05-08 RX ORDER — FERROUS SULFATE 325(65) MG
325 TABLET ORAL 2 TIMES DAILY WITH MEALS
Status: DISCONTINUED | OUTPATIENT
Start: 2017-05-08 | End: 2017-05-09

## 2017-05-08 RX ORDER — POLYETHYLENE GLYCOL 3350 17 G/17G
1 POWDER, FOR SOLUTION ORAL 2 TIMES DAILY PRN
Status: DISCONTINUED | OUTPATIENT
Start: 2017-05-08 | End: 2017-05-10 | Stop reason: HOSPADM

## 2017-05-08 RX ORDER — CELECOXIB 200 MG/1
200 CAPSULE ORAL 2 TIMES DAILY WITH MEALS
Status: DISCONTINUED | OUTPATIENT
Start: 2017-05-09 | End: 2017-05-10 | Stop reason: HOSPADM

## 2017-05-08 RX ORDER — DIPHENHYDRAMINE HYDROCHLORIDE 50 MG/ML
25 INJECTION INTRAMUSCULAR; INTRAVENOUS EVERY 6 HOURS PRN
Status: DISCONTINUED | OUTPATIENT
Start: 2017-05-08 | End: 2017-05-10 | Stop reason: HOSPADM

## 2017-05-08 RX ORDER — HALOPERIDOL 5 MG/ML
1 INJECTION INTRAMUSCULAR EVERY 6 HOURS PRN
Status: DISCONTINUED | OUTPATIENT
Start: 2017-05-08 | End: 2017-05-10 | Stop reason: HOSPADM

## 2017-05-08 RX ORDER — ACETAMINOPHEN 500 MG
1000 TABLET ORAL EVERY 6 HOURS
Status: DISCONTINUED | OUTPATIENT
Start: 2017-05-08 | End: 2017-05-10 | Stop reason: HOSPADM

## 2017-05-08 RX ORDER — LEVOFLOXACIN 750 MG/1
750 TABLET, FILM COATED ORAL DAILY
Status: DISCONTINUED | OUTPATIENT
Start: 2017-05-08 | End: 2017-05-10 | Stop reason: HOSPADM

## 2017-05-08 RX ORDER — DEXAMETHASONE SODIUM PHOSPHATE 4 MG/ML
4 INJECTION, SOLUTION INTRA-ARTICULAR; INTRALESIONAL; INTRAMUSCULAR; INTRAVENOUS; SOFT TISSUE
Status: DISCONTINUED | OUTPATIENT
Start: 2017-05-08 | End: 2017-05-10 | Stop reason: HOSPADM

## 2017-05-08 RX ORDER — ONDANSETRON 2 MG/ML
4 INJECTION INTRAMUSCULAR; INTRAVENOUS EVERY 4 HOURS PRN
Status: DISCONTINUED | OUTPATIENT
Start: 2017-05-08 | End: 2017-05-10 | Stop reason: HOSPADM

## 2017-05-08 RX ORDER — CEFAZOLIN SODIUM 2 G/100ML
2 INJECTION, SOLUTION INTRAVENOUS EVERY 8 HOURS
Status: COMPLETED | OUTPATIENT
Start: 2017-05-08 | End: 2017-05-08

## 2017-05-08 RX ORDER — MAGNESIUM HYDROXIDE 1200 MG/15ML
LIQUID ORAL
Status: DISCONTINUED | OUTPATIENT
Start: 2017-05-08 | End: 2017-05-08 | Stop reason: HOSPADM

## 2017-05-08 RX ORDER — ENEMA 19; 7 G/133ML; G/133ML
1 ENEMA RECTAL
Status: DISCONTINUED | OUTPATIENT
Start: 2017-05-08 | End: 2017-05-08

## 2017-05-08 RX ORDER — SODIUM CHLORIDE 9 MG/ML
INJECTION, SOLUTION INTRAVENOUS
Status: COMPLETED
Start: 2017-05-08 | End: 2017-05-08

## 2017-05-08 RX ORDER — KETOROLAC TROMETHAMINE 30 MG/ML
15 INJECTION, SOLUTION INTRAMUSCULAR; INTRAVENOUS EVERY 6 HOURS
Status: DISPENSED | OUTPATIENT
Start: 2017-05-08 | End: 2017-05-09

## 2017-05-08 RX ORDER — AMOXICILLIN 250 MG
1 CAPSULE ORAL
Status: DISCONTINUED | OUTPATIENT
Start: 2017-05-08 | End: 2017-05-10 | Stop reason: HOSPADM

## 2017-05-08 RX ORDER — BISACODYL 10 MG
10 SUPPOSITORY, RECTAL RECTAL
Status: DISCONTINUED | OUTPATIENT
Start: 2017-05-08 | End: 2017-05-10 | Stop reason: HOSPADM

## 2017-05-08 RX ORDER — AMOXICILLIN 250 MG
1 CAPSULE ORAL NIGHTLY
Status: DISCONTINUED | OUTPATIENT
Start: 2017-05-08 | End: 2017-05-10 | Stop reason: HOSPADM

## 2017-05-08 RX ORDER — OXYCODONE HYDROCHLORIDE 10 MG/1
10 TABLET ORAL
Status: DISCONTINUED | OUTPATIENT
Start: 2017-05-08 | End: 2017-05-10 | Stop reason: HOSPADM

## 2017-05-08 RX ORDER — GABAPENTIN 600 MG/1
600-1200 TABLET ORAL 2 TIMES DAILY
Status: ON HOLD | COMMUNITY
End: 2017-05-10

## 2017-05-08 RX ORDER — OXYCODONE HYDROCHLORIDE 5 MG/1
5 TABLET ORAL
Status: DISCONTINUED | OUTPATIENT
Start: 2017-05-08 | End: 2017-05-10 | Stop reason: HOSPADM

## 2017-05-08 RX ADMIN — ACETAMINOPHEN 1000 MG: 500 TABLET, FILM COATED ORAL at 18:09

## 2017-05-08 RX ADMIN — GABAPENTIN 600 MG: 300 CAPSULE ORAL at 14:43

## 2017-05-08 RX ADMIN — BUDESONIDE AND FORMOTEROL FUMARATE DIHYDRATE 2 PUFF: 160; 4.5 AEROSOL RESPIRATORY (INHALATION) at 07:45

## 2017-05-08 RX ADMIN — PREDNISONE 40 MG: 20 TABLET ORAL at 07:45

## 2017-05-08 RX ADMIN — INSULIN LISPRO 8 UNITS: 100 INJECTION, SOLUTION INTRAVENOUS; SUBCUTANEOUS at 20:39

## 2017-05-08 RX ADMIN — INSULIN LISPRO 9 UNITS: 100 INJECTION, SOLUTION INTRAVENOUS; SUBCUTANEOUS at 18:11

## 2017-05-08 RX ADMIN — CEFAZOLIN SODIUM 2 G: 2 INJECTION, SOLUTION INTRAVENOUS at 20:38

## 2017-05-08 RX ADMIN — OXYCODONE HYDROCHLORIDE 20 MG: 10 TABLET ORAL at 01:57

## 2017-05-08 RX ADMIN — OXYCODONE HYDROCHLORIDE 10 MG: 10 TABLET ORAL at 14:45

## 2017-05-08 RX ADMIN — CEFAZOLIN SODIUM 2 G: 2 INJECTION, SOLUTION INTRAVENOUS at 13:32

## 2017-05-08 RX ADMIN — OXYCODONE HYDROCHLORIDE 10 MG: 10 TABLET ORAL at 20:39

## 2017-05-08 RX ADMIN — CYCLOBENZAPRINE HYDROCHLORIDE 10 MG: 10 TABLET, FILM COATED ORAL at 03:25

## 2017-05-08 RX ADMIN — DULOXETINE HYDROCHLORIDE 60 MG: 60 CAPSULE, DELAYED RELEASE ORAL at 07:46

## 2017-05-08 RX ADMIN — MONTELUKAST SODIUM 10 MG: 10 TABLET, FILM COATED ORAL at 07:46

## 2017-05-08 RX ADMIN — MORPHINE SULFATE 4 MG: 4 INJECTION INTRAVENOUS at 00:24

## 2017-05-08 RX ADMIN — OMEPRAZOLE 20 MG: 20 CAPSULE, DELAYED RELEASE ORAL at 07:46

## 2017-05-08 RX ADMIN — KETOROLAC TROMETHAMINE 15 MG: 30 INJECTION, SOLUTION INTRAMUSCULAR at 18:09

## 2017-05-08 RX ADMIN — DOXYCYCLINE 100 MG: 100 TABLET ORAL at 07:46

## 2017-05-08 RX ADMIN — TIOTROPIUM BROMIDE 1 CAPSULE: 18 CAPSULE ORAL; RESPIRATORY (INHALATION) at 07:50

## 2017-05-08 RX ADMIN — GABAPENTIN 600 MG: 300 CAPSULE ORAL at 07:46

## 2017-05-08 RX ADMIN — KETOROLAC TROMETHAMINE 15 MG: 30 INJECTION, SOLUTION INTRAMUSCULAR at 13:32

## 2017-05-08 RX ADMIN — MORPHINE SULFATE 4 MG: 4 INJECTION INTRAVENOUS at 05:38

## 2017-05-08 RX ADMIN — ACETAMINOPHEN 1000 MG: 500 TABLET, FILM COATED ORAL at 14:43

## 2017-05-08 RX ADMIN — BUDESONIDE AND FORMOTEROL FUMARATE DIHYDRATE 2 PUFF: 160; 4.5 AEROSOL RESPIRATORY (INHALATION) at 20:38

## 2017-05-08 RX ADMIN — GABAPENTIN 600 MG: 300 CAPSULE ORAL at 01:56

## 2017-05-08 RX ADMIN — FENTANYL CITRATE 25 MCG: 50 INJECTION, SOLUTION INTRAMUSCULAR; INTRAVENOUS at 11:34

## 2017-05-08 RX ADMIN — LEVOFLOXACIN 750 MG: 750 TABLET, FILM COATED ORAL at 20:40

## 2017-05-08 RX ADMIN — GABAPENTIN 600 MG: 300 CAPSULE ORAL at 20:40

## 2017-05-08 RX ADMIN — INSULIN GLARGINE 20 UNITS: 100 INJECTION, SOLUTION SUBCUTANEOUS at 20:38

## 2017-05-08 RX ADMIN — SODIUM CHLORIDE: 9 INJECTION, SOLUTION INTRAVENOUS at 13:30

## 2017-05-08 RX ADMIN — CYCLOBENZAPRINE HYDROCHLORIDE 10 MG: 10 TABLET, FILM COATED ORAL at 20:39

## 2017-05-08 RX ADMIN — FENTANYL CITRATE 25 MCG: 50 INJECTION, SOLUTION INTRAMUSCULAR; INTRAVENOUS at 10:28

## 2017-05-08 RX ADMIN — FENTANYL CITRATE 25 MCG: 50 INJECTION, SOLUTION INTRAMUSCULAR; INTRAVENOUS at 10:00

## 2017-05-08 RX ADMIN — FERROUS SULFATE TAB 325 MG (65 MG ELEMENTAL FE) 325 MG: 325 (65 FE) TAB at 18:09

## 2017-05-08 RX ADMIN — SIMVASTATIN 10 MG: 10 TABLET, FILM COATED ORAL at 20:38

## 2017-05-08 RX ADMIN — DOXYCYCLINE 100 MG: 100 INJECTION, POWDER, LYOPHILIZED, FOR SOLUTION INTRAVENOUS at 03:07

## 2017-05-08 RX ADMIN — TRAZODONE HYDROCHLORIDE 300 MG: 100 TABLET ORAL at 20:38

## 2017-05-08 RX ADMIN — SIMVASTATIN 10 MG: 10 TABLET, FILM COATED ORAL at 01:57

## 2017-05-08 RX ADMIN — INSULIN LISPRO 3 UNITS: 100 INJECTION, SOLUTION INTRAVENOUS; SUBCUTANEOUS at 13:14

## 2017-05-08 RX ADMIN — LEVOTHYROXINE SODIUM 75 MCG: 75 TABLET ORAL at 05:38

## 2017-05-08 RX ADMIN — TRAZODONE HYDROCHLORIDE 300 MG: 100 TABLET ORAL at 01:56

## 2017-05-08 RX ADMIN — SODIUM CHLORIDE 1000 ML: 9 INJECTION, SOLUTION INTRAVENOUS at 02:53

## 2017-05-08 ASSESSMENT — ENCOUNTER SYMPTOMS
FOCAL WEAKNESS: 0
NERVOUS/ANXIOUS: 0
HEADACHES: 0
NAUSEA: 0
PALPITATIONS: 0
WHEEZING: 0
ABDOMINAL PAIN: 0
SPEECH CHANGE: 0
FEVER: 0
COUGH: 0
ORTHOPNEA: 0
FLANK PAIN: 0
WEAKNESS: 1
VOMITING: 0
SENSORY CHANGE: 0
SHORTNESS OF BREATH: 1
CHILLS: 0
MYALGIAS: 1
HALLUCINATIONS: 0

## 2017-05-08 ASSESSMENT — PAIN SCALES - GENERAL
PAINLEVEL_OUTOF10: 8
PAINLEVEL_OUTOF10: 10
PAINLEVEL_OUTOF10: 3
PAINLEVEL_OUTOF10: 0
PAINLEVEL_OUTOF10: 3
PAINLEVEL_OUTOF10: 5
PAINLEVEL_OUTOF10: 8
PAINLEVEL_OUTOF10: 10
PAINLEVEL_OUTOF10: 9
PAINLEVEL_OUTOF10: 10
PAINLEVEL_OUTOF10: 5
PAINLEVEL_OUTOF10: 6
PAINLEVEL_OUTOF10: 10
PAINLEVEL_OUTOF10: 5
PAINLEVEL_OUTOF10: 0
PAINLEVEL_OUTOF10: 10
PAINLEVEL_OUTOF10: 10

## 2017-05-08 ASSESSMENT — LIFESTYLE VARIABLES
ALCOHOL_USE: NO
SUBSTANCE_ABUSE: 0

## 2017-05-08 NOTE — CARE PLAN
Problem: Knowledge Deficit  Goal: Knowledge of disease process/condition, treatment plan, diagnostic tests, and medications will improve  Outcome: PROGRESSING AS EXPECTED  Pt educated regarding plan of care and medications. All questions answered.     Problem: Pain Management  Goal: Pain level will decrease to patient’s comfort goal  Outcome: PROGRESSING AS EXPECTED  PRN pain meds given, relaxation techniques provided, will continue to monitor

## 2017-05-08 NOTE — ED NOTES
Ortho techs at bedside and are not allowed to pull traction per MD order and paging Dr. Rausch for further guidance.

## 2017-05-08 NOTE — CARE PLAN
Problem: Safety  Goal: Will remain free from injury  Outcome: PROGRESSING AS EXPECTED  Bed locked and in lowest position, call light in reach    Problem: Knowledge Deficit  Goal: Knowledge of disease process/condition, treatment plan, diagnostic tests, and medications will improve  Outcome: PROGRESSING AS EXPECTED  Updated with plan of care, ORIF today

## 2017-05-08 NOTE — ED NOTES
Doppler used to auscultate pulse to LLE, x marked on foot where pulse was auscultated. Pt moved to G23.

## 2017-05-08 NOTE — PROGRESS NOTES
Hospital Medicine Progress Note, Adult, Complex               Author: Byron Ward Date & Time created: 5/8/2017  12:22 PM     CC : 67 yo female hx DM, COPD chronic resp failure on home o2, obesity , HTN, diastolic chf admitted fall , left leg pain- dx fx.    Interval History:    Post left leg ORIF. Requiring 6 L nc. Pain improved control.     Review of Systems:  Review of Systems   Constitutional: Negative for fever and chills.   HENT: Negative for congestion.    Respiratory: Positive for shortness of breath. Negative for cough and wheezing.    Cardiovascular: Negative for chest pain, palpitations and orthopnea.   Gastrointestinal: Negative for nausea, vomiting and abdominal pain.   Genitourinary: Negative for dysuria and flank pain.   Musculoskeletal: Positive for myalgias and joint pain.   Neurological: Positive for weakness. Negative for sensory change, speech change, focal weakness and headaches.   Psychiatric/Behavioral: Negative for hallucinations and substance abuse. The patient is not nervous/anxious.        Physical Exam:  Physical Exam   Constitutional: She is oriented to person, place, and time. No distress.   Eyes: EOM are normal. No scleral icterus.   Neck: Neck supple.   Cardiovascular: Normal rate and regular rhythm.    No murmur heard.  Pulmonary/Chest: No stridor. She has no wheezes. She has no rales.   Abdominal: Soft. Bowel sounds are normal. She exhibits no distension. There is no tenderness.   Musculoskeletal: She exhibits tenderness. She exhibits no edema.   Left leg wrapped.    Neurological: She is alert and oriented to person, place, and time. No cranial nerve deficit. Coordination abnormal.   Skin: Skin is warm and dry. She is not diaphoretic.   Psychiatric: She has a normal mood and affect. Her behavior is normal.       Labs:        Invalid input(s): [id number]  Recent Labs      05/07/17 2038   TROPONINI  <0.01   BNPBTYPENAT  160*     Recent Labs      05/07/17 2038 05/08/17   0611    SODIUM  139  140   POTASSIUM  4.5  3.9   CHLORIDE  100  103   CO2  30  32   BUN  21  18   CREATININE  1.51*  1.52*   CALCIUM  8.7  8.5     Recent Labs      17   0611   ALTSGPT  15   --    ASTSGOT  18   --    ALKPHOSPHAT  40   --    TBILIRUBIN  0.5   --    GLUCOSE  207*  109*     Recent Labs      17   0611   RBC  2.73*  2.75*   HEMOGLOBIN  7.8*  7.9*   HEMATOCRIT  25.4*  26.0*   PLATELETCT  258  250   PROTHROMBTM  13.8   --    APTT  29.6   --    INR  1.03   --      Recent Labs      17   0611   WBC  12.5*  12.3*   NEUTSPOLYS  87.00*  73.50*   LYMPHOCYTES  6.60*  15.20*   MONOCYTES  5.10  9.20   EOSINOPHILS  0.20  0.70   BASOPHILS  0.30  0.40   ASTSGOT  18   --    ALTSGPT  15   --    ALKPHOSPHAT  40   --    TBILIRUBIN  0.5   --            Hemodynamics:  Temp (24hrs), Av.7 °C (98.1 °F), Min:36.4 °C (97.6 °F), Max:37 °C (98.6 °F)  Temperature: 36.7 °C (98.1 °F)  Pulse  Av.5  Min: 55  Max: 78Heart Rate (Monitored): 75  Blood Pressure :  (175/164 - Dr. Welch informed; no orders), NIBP: 131/43 mmHg     Respiratory:    Respiration: (!) 24, Pulse Oximetry: 93 %, O2 Daily Delivery Respiratory : Silicone Nasal Cannula     Given By:: Mask, Work Of Breathing / Effort: Moderate  RUL Breath Sounds: Diminished, RML Breath Sounds: Diminished, RLL Breath Sounds: Diminished, BIRD Breath Sounds: Diminished, LLL Breath Sounds: Diminished  Fluids:    Intake/Output Summary (Last 24 hours) at 17 1222  Last data filed at 17 0955   Gross per 24 hour   Intake    900 ml   Output    400 ml   Net    500 ml     Weight: 108.863 kg (240 lb)  GI/Nutrition:  Orders Placed This Encounter   Procedures   • Diet Order     Standing Status: Standing      Number of Occurrences: 1      Standing Expiration Date:      Order Specific Question:  Diet:     Answer:  Diabetic [3]     Medical Decision Making, by Problem:  Active Hospital Problems    Diagnosis   •  *Tibia/fibula fracture [S82.209A, S82.409A]- post orif  IS, RT protocols   Pain control- prn oxycodone, IV ms  Ortho input- will need PT eval   • Acute on chronic respiratory failure with hypoxia and hypercapnia (CMS-HCC) [J96.21, J96.22]- copd, pneumonia  IS, wean fio2 as annia   • Pneumonia [J18.9]- klebsiella pneumoniae - improved symptoms.   cw levoquin-- dc doxy    • Acute exacerbation of chronic obstructive pulmonary disease (COPD) (CMS-HCC) [J44.1]  Prednisone, RT, bronchodilators.    • DM type 2, uncontrolled, with renal complications (CMS-HCC) [E11.29, E11.65]  ISS   • Iron deficiency anemia [D50.9]  Monitor hgb post op.  Iron supplements.    • Chronic kidney disease, stage 3 [N18.3]  Fu renal fxn, lytes uop.  Hx of afib- controlled  Monitor   Hx of diastolic chf  Monitor fluids.       Labs reviewed, Medications reviewed and Radiology images reviewed  Mckeon catheter: No Mckeon      DVT Prophylaxis: Contraindicated - High bleeding risk      Antibiotics: Treating active infection/contamination beyond 24 hours perioperative coverage

## 2017-05-08 NOTE — ED NOTES
MD called and claims that patient's ankle needs to have more traction and will have ortho techs come to ED to realign his ankle.

## 2017-05-08 NOTE — ED PROVIDER NOTES
"ED Provider Note    CHIEF COMPLAINT  Chief Complaint   Patient presents with   • Leg Pain     Patient complains of left lower leg pain.       HPI  Priya Callahan is a 66 y.o. female who presents to the emergency department complaining of injury to the left lower leg. The patient was using her walker and tripped and twisted her left leg which then gave out and she felt a lot of pain in the lower leg. This happened around 4:30 in the afternoon. The patient says that she did very lightly hit her head but did not lose consciousness and does not have headache or neck pain. The patient was discharged from the hospital this last Thursday after being admitted for COPD with exacerbation, pneumonia and sepsis. She does not recognize any other exacerbating or relieving factors or precipitating events.    REVIEW OF SYSTEMS the patient felt that her respiratory symptoms had been improving. No nausea vomiting or diarrhea no chest pain or hemoptysis. She is not feeling particular short of breath. All other systems negative    PAST MEDICAL HISTORY  Past Medical History   Diagnosis Date   • COPD    • ASTHMA    • Hypertension    • Pneumonia      as a child   • Bronchitis      as a child   • Unspecified urinary incontinence    • Fall    • Infectious disease      Hepatitis C   • Arthritis      \"everywhere\"   • Fibromyalgia    • Neuropathy (CMS-[facility])      bilat feet   • Other specified symptom associated with female genital organs      Hysterectomy at age 22yrs   • Diabetes      Type 2   • Hepatitis C      \"I have markers in blood but not active\"   • Congestive heart failure (CMS-[facility]) 2013     related to pulmonary failure   • Indigestion    • GERD (gastroesophageal reflux disease)    • Breath shortness      \"only with flare up with allergies\"   • Sleep apnea      does not wear CPAP, \"can't do it\"   • Disorder of thyroid      Hypothyroid   • Backpain      chronic back pain   • Heart burn    • Pain 9/13/2016     \"pain everywhere\" "   • Psychiatric problem 9/13/2016     PTSD   • On home oxygen therapy        FAMILY HISTORY  Family History   Problem Relation Age of Onset   • Lung Disease Mother    • Alcohol/Drug Mother    • Heart Disease Mother    • Cancer Father    • Cancer Brother    • Diabetes Maternal Grandmother    • Stroke Paternal Grandmother        SOCIAL HISTORY  Social History     Social History   • Marital Status:      Spouse Name: N/A   • Number of Children: N/A   • Years of Education: N/A     Social History Main Topics   • Smoking status: Former Smoker -- 1.00 packs/day for 50 years     Types: Cigarettes     Quit date: 02/01/2017   • Smokeless tobacco: Never Used   • Alcohol Use: No   • Drug Use: No   • Sexual Activity: Not Asked     Other Topics Concern   • None     Social History Narrative       SURGICAL HISTORY  Past Surgical History   Procedure Laterality Date   • Gyn surgery       hysterectomy    • Other orthopedic surgery       L knee replacement    • Other orthopedic surgery       R carpal tunnel    • Us-needle core bx-breast panel     • Lumpectomy Left      Left breast   • Pr inj dx/ther agnt paravert facet joint, bruce* Left 7/10/2015     Procedure: INJ PARA FACET L/S 1 LVL W/IG - L3-S1;  Surgeon: Xavier Arteaga D.O.;  Location: [facility];  Service: Pain Management   • Pr inj dx/ther agnt paravert facet joint, bruce*  7/10/2015     Procedure: INJ PARA FACET L/S 2D LVL W/IG;  Surgeon: Xavier Arteaga D.O.;  Location: [facility] ORS;  Service: Pain Management   • Pr inj dx/ther agnt paravert facet joint, bruce*  7/10/2015     Procedure: INJ PARA FACET L/S 3D LVL W/IG;  Surgeon: Xavier Arteaga D.O.;  Location: SURGERY [facility] ORS;  Service: Pain Management   • Pr inject nerv blck,othr periph nerv  7/10/2015     Procedure: INJ-ANES AGENT-OTHER PHER.NRVE;  Surgeon: Xavier Arteaga D.O.;  Location: SURGERY [facility] ORS;  Service: Pain Management   • Pr inj dx/ther agnt paravert  facet joint, bruce* Left 7/17/2015     Procedure: INJ PARA FACET L/S 1 LVL W/IG - L3-S1;  Surgeon: Xavier Arteaga D.O.;  Location: SURGERY SURGICAL [facility] ORS;  Service: Pain Management   • Pr inj dx/ther agnt paravert facet joint, bruce*  7/17/2015     Procedure: INJ PARA FACET L/S 2D LVL W/IG;  Surgeon: Xavier Arteaga D.O.;  Location: SURGERY SURGICAL [facility] ORS;  Service: Pain Management   • Pr inj dx/ther agnt paravert facet joint, bruce*  7/17/2015     Procedure: INJ PARA FACET L/S 3D LVL W/IG;  Surgeon: Xavier Arteaga D.O.;  Location: SURGERY SURGICAL [facility] ORS;  Service: Pain Management   • Pr inject nerv blck,othr periph nerv  7/17/2015     Procedure: INJ-ANES AGENT-OTHER PHER.NRVE;  Surgeon: Xavier Arteaga D.O.;  Location: SURGERY [facility] ORS;  Service: Pain Management   • Pr dstr nrolytc agnt parverteb fct sngl lmbr/sacral Left 10/2/2015     Procedure: NEURO DEST FACET L/S W/IG SNGL - L3-S1;  Surgeon: Xavier Arteaga D.O.;  Location: SURGERY [facility] ORS;  Service: Pain Management   • Pr dstr nrolytc agnt parverteb fct addl lmbr/sacral  10/2/2015     Procedure: NEURO DEST FACET L/S W/IG ADDL;  Surgeon: Xavier Arteaga D.O.;  Location: SURGERY [facility] ORS;  Service: Pain Management   • Pr inject rx other periph nerve  10/2/2015     Procedure: NEUROLYTIC DEST-OTHER NERVE;  Surgeon: Xavier Arteaga D.O.;  Location: SURGERY SURGICAL [facility] ORS;  Service: Pain Management   • Pr dstr nrolytc agnt parverteb fct addl lmbr/sacral  10/2/2015     Procedure: NEURO DEST FACET L/S W/IG ADDL;  Surgeon: Xavier Arteaga D.O.;  Location: SURGERY SURGICAL [facility] ORS;  Service: Pain Management   • Pr dstr nrolytc agnt parverteb fct sngl lmbr/sacral Right 11/6/2015     Procedure: NEURO DEST FACET L/S W/IG SNGL - L3-S1;  Surgeon: Xavier Arteaga D.O.;  Location: SURGERY SURG[facility]S;  Service: Pain Management   • Pr dstr nrolytc agnt parverteb fct addl lmbr/sacral  11/6/2015     Procedure:  NEURO DEST FACET L/S W/IG ADDL;  Surgeon: Xavier Arteaga D.O.;  Location: SURGERY SUR[facility]S;  Service: Pain Management   • Pr inject rx other periph nerve  11/6/2015     Procedure: NEUROLYTIC DEST-OTHER NERVE;  Surgeon: Xavier Arteaga D.O.;  Location: SURGERY [facility] ORS;  Service: Pain Management   • Pr dstr nrolytc agnt parverteb fct addl lmbr/sacral  11/6/2015     Procedure: NEURO DEST FACET L/S W/IG ADDL;  Surgeon: Xavier Arteaga D.O.;  Location: SURGERY [facility] ORS;  Service: Pain Management   • Pr dstr nrolytc agnt parverteb fct sngl lmbr/sacral Left 9/16/2016     Procedure: NEURO DEST FACET L/S W/IG SNGL - L3-S1;  Surgeon: Xavier Arteaga D.O.;  Location: SURGERY [facility] ORS;  Service: Pain Management   • Pr dstr nrolytc agnt parverteb fct addl lmbr/sacral  9/16/2016     Procedure: NEURO DEST FACET L/S W/IG ADDL;  Surgeon: Xavier Arteaga D.O.;  Location: SURGERY [facility] ORS;  Service: Pain Management   • Pr dstr nrolytc agnt parverteb fct addl lmbr/sacral  9/16/2016     Procedure: NEURO DEST FACET L/S W/IG ADDL;  Surgeon: Xavier Arteaga D.O.;  Location: SURGERY [facility] ORS;  Service: Pain Management   • Pr fluoroscopic guidance needle placement  9/16/2016     Procedure: FLOURO GUIDE NEEDLE PLACEMENT;  Surgeon: Xavier Arteaga D.O.;  Location: SURGERY SURG[facility] ORS;  Service: Pain Management       CURRENT MEDICATIONS  Home Medications     Reviewed by Jessica Farias R.N. (Registered Nurse) on 05/07/17 at 1912  Med List Status: Partial    Medication Last Dose Status    albuterol-ipratropium (COMBIVENT)  MCG/ACT Aerosol unknown Active    amlodipine (NORVASC) 10 MG Tab unknown Active    carvedilol (COREG) 3.125 MG Tab unknown Active    cyclobenzaprine (FLEXERIL) 10 MG Tab unknown Active    duloxetine (CYMBALTA) 60 MG Cap DR Particles delayed-release capsule  Active    escitalopram (LEXAPRO) 10 MG Tab unknown Active    ferrous gluconate  "(FERGON) 324 (38 FE) MG Tab unknown Active    fluticasone-salmeterol (ADVAIR) 500-50 MCG/DOSE AEROSOL POWDER, BREATH ACTIVATED unknown Active    gabapentin (NEURONTIN) 600 MG tablet unknown Active    hydrocortisone (CORTEF) 20 MG Tab  Active    insulin glargine (LANTUS) 100 UNIT/ML Solution unknown Active    ipratropium-albuterol (DUONEB) 0.5-2.5 (3) MG/3ML nebulizer solution  Active    levothyroxine (SYNTHROID) 75 MCG Tab unknown Active    montelukast (SINGULAIR) 10 MG Tab unknown Active    multivitamin (THERAGRAN) Tab unknown Active    omeprazole (PRILOSEC) 20 MG delayed-release capsule unknown Active    oxycodone immediate release (ROXICODONE) 10 MG immediate release tablet unknown Active    potassium chloride SA (KDUR) 20 MEQ Tab CR unknown Active    simvastatin (ZOCOR) 10 MG Tab unknown Active    sulfamethoxazole-trimethoprim (BACTRIM DS) 800-160 MG tablet  Active    tiotropium (SPIRIVA) 18 MCG Cap  Active    trazodone (DESYREL) 100 MG Tab unknown Active                ALLERGIES  Allergies   Allergen Reactions   • Vitamin C Diarrhea     \"violent diarrhea\"   • Keflex Rash     Severe rash, has tolerated Augmentin and Zosyn (as of April 2017)   • Codeine Nausea     Severe stomach pain   • Lyrica Nausea     Nausea, dizzy   • Sudafed Nausea and Unspecified     Chest pain, nausea       PHYSICAL EXAM  VITAL SIGNS: /36 mmHg  Pulse 67  Temp(Src) 36.9 °C (98.4 °F)  Resp 22  Ht 1.626 m (5' 4.02\")  Wt 108.863 kg (240 lb)  BMI 41.18 kg/m2  SpO2 90%   Oxygen saturation is interpreted as adequate with supplemental oxygen by nasal cannula  Constitutional: Awake and verbal and chronically ill-appearing  HENT: There is a very slight abrasion adjacent to the left orbit  Eyes: No erythema or discharge or jaundice  Neck: Trachea midline no JVD no C-spine tenderness  Cardiovascular: Regular rate and rhythm  Lungs: Slightly distant bilaterally but I don't hear any crackles or wheezes and she does not appear short of " breath  Abdomen/Back: Obese soft nontender no rebound or guarding or peritoneal findings  Skin: Warm and dry  Musculoskeletal: There is an obvious deformity of the left lower extremity with the left foot rotated laterally in an abnormal position, the skin is closed foot is warm and well perfused  Neurologic: Awake and verbal and moving the other extremities without difficulty    CHART REVIEW  I reviewed the patient's recent discharge summary she was discharged with diagnosis including but not limited to COPD, hospital-acquired pneumonia and sepsis    Laboratory  Tonight in the emergency department his CBC shows a white blood count of 2.5 the hemoglobin is very low at 7.8 and this value was 8.3 on May 3, 2017. Patient metabolic shows an elevated creatinine of 1.51 and an elevated blood sugar of 207. INR is normal troponin is normal BMP is 160.    EKG interpretation  A 12-lead EKG showed a sinus rhythm 62 bpm there is baseline wandering there is no pathologic ST elevation T waves were inverted in lead aVL and V6.    Radiology  DX-TIBIA AND FIBULA LEFT   Final Result      1.  Partial reduction of distal tibial fracture with posterior fracture fragment projecting over the dorsal talus and the anterior aspect of distal tibia lying anterior to the talar dome.      2.  Improvement in angulation of distal fibular fracture      DX-TIBIA AND FIBULA LEFT   Final Result      1.  Intra-articular LEFT distal tibial fracture with displacement and angulation      2.  Distal fibular diaphyseal fracture with angulation      DX-CHEST-PORTABLE (1 VIEW)   Final Result      1.  No acute cardiopulmonary abnormality identified.      2.  Underlying chronic airspace process, nonspecific      3.  Right lower lobe consolidation, unchanged or slightly increased            PROCEDURES  The patient's lower extremity was clearly deformed at the time of arrival. The patient is not a candidate for sedation in the emergency department due to her  respiratory problems however she was given intravenous fentanyl for pain and I was able to reduce the lower extremity deformity into an anatomic position and the patient was placed in a splint. Follow-up x-ray does show improved position but the patient is clearly going to require operative intervention.    MEDICAL DECISION MAKING and DISPOSITION  In the emergency department an IV was established and the patient was placed on a cardiac monitor and given supplemental oxygen by nasal cannula. The patient was given albuterol and Atrovent by nebulizer she was given intravenous fluids she was given intravenous fentanyl and a titrated fashion for discomfort. I have ordered intravenous antibiotics. I have ordered 1 unit of packed red blood cells for transfusion because of the patient's severely low hemoglobin and respiratory disorder. I have reviewed the fracture with Dr. Schaefer who will provide orthopedic consultation and I have also reviewed the case with the hospitalist and the patient is admitted to the hospitalist service for further evaluation and further treatment    IMPRESSION  1. Acute, closed fracture of the distal left tibia and fibula  2. COPD  3. Pneumonia, subacute  4. Renal insufficiency  5. Hyperglycemia  6. Severe anemia  7. Critical care time with this patient is 35 minutes         Electronically signed by: Saji Sutton, 5/7/2017 9:56 PM

## 2017-05-08 NOTE — CONSULTS
Ortho:    Please see forthcoming dictation for details. In brief, Ms. Callahan is a 66 year-old female with multiple medical comorbidities including diabetes, obesity, and COPD from [place] who sustained a mechanical level fall with a displaced left mistry C ankle fracture for which I recommend ORIF. Likely today with Dr. Gtz. She understands the risks, benefits, and alternatives to surgery and wishes to proceed.

## 2017-05-08 NOTE — OP REPORT
DATE OF SERVICE:  05/08/2017    DATE OF SERVICE:  05/08/2017    PREOPERATIVE DIAGNOSES:  Trimalleolar left ankle fracture.    POSTOPERATIVE DIAGNOSIS:  Trimalleolar left ankle fracture.    PROCEDURE PERFORMED:  Open reduction and internal fixation, left trimalleolar   ankle fracture, with fixation of posterior lip.    SURGEON:  Rico Gtz MD    ASSISTANT:  Brandon Peres PA-C    ESTIMATED BLOOD LOSS:  Minimal.    INDICATIONS:  This is a 66-year-old female with a very poor bone quality and   multiple medical issues who is just status post trimalleolar ankle fracture   with gross displacement and dislocation.  Risks and benefits of open reduction   internal fixation were discussed at length, which include but not limited to   bleeding, infection, neurovascular damage, pain, stiffness, malunion,   nonunion, DVT, PE, MI, stroke, and death.  They understand all these risks and   wished to proceed.    DESCRIPTION OF PROCEDURE:  The patient was sedated with LMA anesthesia and   administered preoperative antibiotics.  Her left lower extremity was prepped   and draped in the usual sterile fashion.  A standard lateral approach to the   fibula was performed with care taken to avoid all neurovascular structures.    The comminuted fracture was reduced to anatomic position and an OIC 1/3   tubular locking plate was applied with a combination of locking and nonlocking   fixation.  Attention was then turned to the medial side where the medial   malleolar vertical fragment was reduced to anatomic position and held with a   1/3 tubular OIC plate as well with a combination locking and nonlocking   fixation.  The posterior malleolar fragment was then also reduced and held   with 2 OIC 4.0 mm partially threaded screws.  Anatomic reduction was achieved.    Wounds were irrigated, closed with 2-0 Vicryl suture and 3-0 nylon suture.    Sterile dressings were applied.  Posterior splint was applied.  The patient   tolerated the  procedure well.    POSTOPERATIVE PLAN:  The patient will be readmitted to the medicine service   for perioperative antibiotics, DVT prophylaxis, pain control, touch   weightbearing for 8 weeks.       ____________________________________     MARITZA BELTRAN MD PLA / DMITRY    DD:  05/08/2017 09:35:32  DT:  05/08/2017 12:06:56    D#:  [id number]  Job#:  [id number]

## 2017-05-08 NOTE — PROGRESS NOTES
2 RN skin check with Lesley RN    Ears, heels, elbows and sacrum red and blanching. Bruising on upper extremities with skin tear to left arm. Right side of pannus red and flaky. Unable to assess LLE due to fracture and splint placement.

## 2017-05-08 NOTE — DOCUMENTATION QUERY
"DOCUMENTATION QUERY    PROVIDERS: Please select “Cosign w/ note”to reply to query.    To better represent the severity of illness of your patient, please review the following information and exercise your independent professional judgment in responding to this query.     Unspecified Pneumonia in setting of Trimalleolar Fracture/COPD exacerbation/[place] on Chronic Respiratory Failure treated with Broad-spectrum IV antibiotics is documented in the History and Physical and Progress Notes. Based upon the clinical findings, risk factors, and treatment, can this diagnosis be further specified?    • Suspected Probable Aspiration Pneumonia ( present on admission?/ developed after admission?)  • Simple Pneumonia  • Suspected Probable Gram Positive Pneumonia (please specify organism if known)  • Suspected Probable Gram Negative Pneumonia (please specify organism if known)  • Aspiration Pneumonia -  post procedural  • Other type of Pneumonia  ( Atypical, Fungal, H.infleunza, Postobstructive, Hypostatic etc.)  • Pneumonia Ruled out  • Other explanation of Clinical Findings  • Unable to determine      The medical record reflects the following:   Clinical Findings  ----per 5/7 CXR impressions: Underlying chronic airspace process, nonspecific,  Right lower lobe consolidation, unchanged or slightly increased     --per HP : unspecified  \"Pneumonia, treated with Levaquin and doxycycline, + tachypnea, prolonged expiratory phase  throughout with end-expiratory wheeze bilaterally. ,bibasilar    Crackles. \"     Treatment  RT protocol  6 liters Oxygen NC,ANCEF LEVAQUIN DOXYCYCLINE IV Normal Saline Symbicort Spiriva  Duoneb Advair  Labs VS Imaging    Risk Factors 66yoF  COPD with acute Exacerbation, BMI 41, O2 dependence   chronic Respiratory Failure, Falls, DM2  sustained Trimalleolar left ankle fracture s/P Open reduction and internal fixation, left trimalleolar  ankle fracture, with fixation of posterior lip.     Location within medical " record  History and Physical,  and Radiology Results     Thank you,   Luis Gardner , [facility]  [facility]   [phone]

## 2017-05-08 NOTE — DISCHARGE PLANNING
Pt is worried about adequate food and paying utilities upon discharge. Pt lives alone in [place] and has a hx of falls. Pt stated there are teenagers in the neighborhood helping keep her animals fed while she is in the hospital but has no other real support. Pt has O2 but just changed the provider from [facility] to [facility] or [facility], but isn't exactly sure which one. Pt lives in one story home, but has O2 cords in all rooms and pt has hx of falls. Pt stated she drives herself and will get a ride from a from a friend upon discharge, if needed. Pt uses [facility] in [place].   Care Transition Team Assessment    Information Source  Orientation : Oriented x 4  Information Given By: Patient  Who is responsible for making decisions for patient? : Patient         Elopement Risk  Legal Hold: No  Ambulatory or Self Mobile in Wheelchair: No-Not an Elopement Risk  Elopement Risk: Not at Risk for Elopement    Interdisciplinary Discharge Planning  Does Admitting Nurse Feel This Could be a Complex Discharge?: No  Primary Care Physician: Dr Brandon  Lives with - Patient's Self Care Capacity: Alone and Able to Care For Self  Patient or legal guardian wants to designate a caregiver (see row info): No  Support Systems: Family Member(s), Friends / Neighbors  Housing / Facility: [address] [facility]  Do You Take your Prescribed Medications Regularly: Yes  Able to Return to Previous ADL's: Future Time w/Therapy  Mobility Issues: Yes  Prior Services: Home-Independent  Patient Expects to be Discharged to:: home  Assistance Needed: Unknown at this Time  Durable Medical Equipment: Not Applicable    Discharge Preparedness  What is your plan after discharge?: Home with help  Prior Functional Level: Uses Walker  Difficulity with ADLs: Walking    Functional Assesment  Prior Functional Level: Uses Walker    Finances  Financial Barriers to Discharge: Yes  Source of Income: Social Security  Prescription Coverage: Yes    Vision / Hearing  Impairment  Vision Impairment : Yes  Right Eye Vision: Wears Glasses  Left Eye Vision: Wears Glasses  Hearing Impairment : No    Values / Beliefs / Concerns  Values / Beliefs Concerns : No         Domestic Abuse  Have you ever been the victim of abuse or violence?: No  Physical Abuse or Sexual Abuse: No  Verbal Abuse or Emotional Abuse: No  Possible Abuse Reported to:: Not Applicable    Psychological Assessment  History of Substance Abuse: None  History of Psychiatric Problems: No    Discharge Risks or Barriers  Discharge risks or barriers?: Other (comment)  Patient risk factors: Lack of outside supports    Anticipated Discharge Information  Anticipated discharge disposition: Home  Discharge Address: [address] [place], [place] [address]. [id number]  Discharge Contact Phone Number: [phone]

## 2017-05-08 NOTE — DIETARY
NUTRITION SERVICES: BMI - Pt with BMI >40 (=41.18). Weight loss counseling not appropriate in acute care setting. RECOMMEND - Referral to outpatient nutrition services for weight management after D/C.

## 2017-05-08 NOTE — ED NOTES
Pt  BIB [facility] c/o LLE pain s/p ground level fall, possible deformity to LLE, 3+ pitting edema present to left pedal, difficulty palpating left pedal pulse, splint in place from [facility], bruising noted, pt also on 6L O2 per n/c SPO2 85-87%, does have hx of COPD, and recently hospitalization for hypoxia and sepsis per report. Pt able to speak in full sentences, pt placed on monitor, changed into gown, given blankets, and call light. Will notify ER charge of high acuity and send pt to appropriate pod. Will notify ERP of difficulty palpating pulse.

## 2017-05-08 NOTE — PROGRESS NOTES
Pt arrived to unit via transport at 0130. Pt oriented to room, unit, and plan of care. Tele-monitor placed and monitor room notified. All questions answered at this time. Call light within reach; fall precautions in place.

## 2017-05-08 NOTE — H&P
DATE OF ADMISSION:  05/07/2017    PRIMARY CARE PHYSICIAN:  The patient is followed by [facility] in [place], she   believes her new primary care physician's name is Dr. Brandon.    CHIEF COMPLAINT:  Left ankle pain.    HISTORY OF PRESENT ILLNESS:  A 66-year-old female.  She has multiple medical   problems.  She presents to the emergency room after a fall with left ankle   pain.  The patient says that she tried to stand up, basically her ankle went   out from under her.  She immediately had severe pain at the left ankle and was   not able to walk.  She did not lose consciousness.  She did not hit her head.    The patient's fracture was reduced in the emergency room.  She continues to   have severe pain at her left leg and ankle.  Patient does have chronic   respiratory failure.  She is on 4 L by nasal cannula at baseline.  She says   that her breathing is near baseline.  She does have occasional cough   productive of yellow sputum.  She is always short of breath with exertion and   this is no different today.  No chest pain.  No palpitations.  She recently   completed steroids at home for COPD exacerbation.    REVIEW OF SYSTEMS:  A comprehensive review of systems was performed.  All   pertinent positives and negatives are described in the HPI, all other systems   reviewed and are negative.    PAST MEDICAL HISTORY:  1.  COPD.  2.  Chronic respiratory failure, 4 L by nasal cannula at baseline.  3.  Chronic kidney disease, stage III.  4.  Diabetes, insulin-dependent, with complications of neuropathy.  5.  History of atrial fibrillation.  The patient does not have further   details.    SOCIAL HISTORY:  She quit smoking in February of this year.  She does not   drink alcohol.  She lives alone.    FAMILY HISTORY:  Reviewed and noncontributory to this case.    ALLERGIES:  VITAMIN C, KEFLEX, CODEINE, LYRICA, SUDAFED.    PHYSICAL EXAMINATION:  VITAL SIGNS:  Temperature is 36.9, blood pressure 110/36, pulse 67,   respirations  18.  GENERAL:  Well developed, well nourished.  She appears chronically ill.  She   appears to be in pain of her left ankle.  HEENT:  Pupils are equally round and reactive.  Extraocular movements are   intact.  Anicteric sclerae.  NECK:  Supple, no lymphadenopathy, no thyromegaly.  CARDIOVASCULAR:  Regular rate and rhythm.  No murmurs, rubs or gallops.  PMI   is nondisplaced.  RESPIRATORY:  The patient is tachypneic.  She has prolonged expiratory phase   throughout with end-expiratory wheeze bilaterally.  There are bibasilar   crackles.  ABDOMINAL:  Soft, nontender, nondistended.  No rebound or guarding.  EXTREMITIES:  1+ edema to the knees bilaterally.  Her left ankle is in a   splint.  She is able to wiggle her toes.  They are warm and well perfused.    LABORATORY DATA:  White blood cell count 12.8, hemoglobin 7.8, platelets 258.    Sodium 139, potassium 4.5, BUN 21, creatinine 1.51.  X-ray of left ankle   shows left distal tib-fib fracture.  Chest x-ray, right lower lobe process,   similar to prior chest x-ray.    ASSESSMENT AND PLAN:  A 66-year-old female who presents with a left tib-fib   fracture.  1.  Left tib-fib fracture.  This has been reduced in the emergency room.  Dr. Rausch of orthopedics to see the patient.  She may require operative   repair.  2.  Acute on chronic respiratory failure.  The patient is requiring 6 L of   oxygen by nasal cannula to maintain saturations, she is normally on 4 liters.  She   does have an infiltrate on her x-ray and endorses cough.  She has   leukocytosis.  We are going to treat for pneumonia and chronic obstructive   pulmonary disease exacerbation.  3.  Acute exacerbation of chronic obstructive pulmonary disease.  The patient   was started on prednisone.  4.  Pneumonia, treated with Levaquin and doxycycline, assess her response.  5.  Chronic kidney disease.  Patient is near her baseline, renally dose   medications.  6.  Anemia, a unit of packed red blood cells was  ordered in the emergency   room.  The patient has chronic anemia, probable outpatient workup as long as   the hemoglobin remained stable.  7.  Prophylaxis.  No chemoprophylaxis for deep venous thrombosis as the   patient has anemia requiring transfusion.  8.  Full code.    Case discussed with emergency room physician, Dr. Saji Sutton.  I reviewed the   above studies including the chest x-ray showing right-sided infiltrate myself.    I except the patient to remain in the hospital for greater than 2 midnights.       ____________________________________     CLEMENTE MEDRANO MD    AEF / NTS    DD:  05/08/2017 01:26:49  DT:  05/08/2017 05:46:04    D#:  [id number]  Job#:  [id number]

## 2017-05-09 LAB
ANION GAP SERPL CALC-SCNC: 8 MMOL/L (ref 0–11.9)
ANISOCYTOSIS BLD QL SMEAR: ABNORMAL
BASOPHILS # BLD AUTO: 0 % (ref 0–1.8)
BASOPHILS # BLD: 0 K/UL (ref 0–0.12)
BUN SERPL-MCNC: 25 MG/DL (ref 8–22)
CALCIUM SERPL-MCNC: 8 MG/DL (ref 8.5–10.5)
CHLORIDE SERPL-SCNC: 99 MMOL/L (ref 96–112)
CO2 SERPL-SCNC: 30 MMOL/L (ref 20–33)
CREAT SERPL-MCNC: 1.8 MG/DL (ref 0.5–1.4)
EOSINOPHIL # BLD AUTO: 0.12 K/UL (ref 0–0.51)
EOSINOPHIL NFR BLD: 0.9 % (ref 0–6.9)
ERYTHROCYTE [DISTWIDTH] IN BLOOD BY AUTOMATED COUNT: 59.1 FL (ref 35.9–50)
GFR SERPL CREATININE-BSD FRML MDRD: 28 ML/MIN/1.73 M 2
GLUCOSE BLD-MCNC: 168 MG/DL (ref 65–99)
GLUCOSE BLD-MCNC: 170 MG/DL (ref 65–99)
GLUCOSE BLD-MCNC: 179 MG/DL (ref 65–99)
GLUCOSE BLD-MCNC: 272 MG/DL (ref 65–99)
GLUCOSE SERPL-MCNC: 235 MG/DL (ref 65–99)
HCT VFR BLD AUTO: 24.1 % (ref 37–47)
HGB BLD-MCNC: 7.2 G/DL (ref 12–16)
LG PLATELETS BLD QL SMEAR: NORMAL
LYMPHOCYTES # BLD AUTO: 0 K/UL (ref 1–4.8)
LYMPHOCYTES NFR BLD: 0 % (ref 22–41)
MANUAL DIFF BLD: NORMAL
MCH RBC QN AUTO: 28.6 PG (ref 27–33)
MCHC RBC AUTO-ENTMCNC: 29.9 G/DL (ref 33.6–35)
MCV RBC AUTO: 95.6 FL (ref 81.4–97.8)
MICROCYTES BLD QL SMEAR: ABNORMAL
MONOCYTES # BLD AUTO: 0.58 K/UL (ref 0–0.85)
MONOCYTES NFR BLD AUTO: 4.3 % (ref 0–13.4)
MORPHOLOGY BLD-IMP: NORMAL
NEUTROPHILS # BLD AUTO: 12.89 K/UL (ref 2–7.15)
NEUTROPHILS NFR BLD: 94.8 % (ref 44–72)
NRBC # BLD AUTO: 0 K/UL
NRBC BLD AUTO-RTO: 0 /100 WBC
PLATELET # BLD AUTO: 214 K/UL (ref 164–446)
PLATELET BLD QL SMEAR: NORMAL
PMV BLD AUTO: 9.3 FL (ref 9–12.9)
POLYCHROMASIA BLD QL SMEAR: NORMAL
POTASSIUM SERPL-SCNC: 4.9 MMOL/L (ref 3.6–5.5)
RBC # BLD AUTO: 2.52 M/UL (ref 4.2–5.4)
RBC BLD AUTO: PRESENT
SODIUM SERPL-SCNC: 137 MMOL/L (ref 135–145)
WBC # BLD AUTO: 13.6 K/UL (ref 4.8–10.8)

## 2017-05-09 PROCEDURE — A9270 NON-COVERED ITEM OR SERVICE: HCPCS

## 2017-05-09 PROCEDURE — 36415 COLL VENOUS BLD VENIPUNCTURE: CPT

## 2017-05-09 PROCEDURE — 99233 SBSQ HOSP IP/OBS HIGH 50: CPT | Performed by: HOSPITALIST

## 2017-05-09 PROCEDURE — 700102 HCHG RX REV CODE 250 W/ 637 OVERRIDE(OP): Performed by: HOSPITALIST

## 2017-05-09 PROCEDURE — A9270 NON-COVERED ITEM OR SERVICE: HCPCS | Performed by: ORTHOPAEDIC SURGERY

## 2017-05-09 PROCEDURE — G8987 SELF CARE CURRENT STATUS: HCPCS | Mod: CL

## 2017-05-09 PROCEDURE — 82962 GLUCOSE BLOOD TEST: CPT | Mod: 91

## 2017-05-09 PROCEDURE — 97535 SELF CARE MNGMENT TRAINING: CPT

## 2017-05-09 PROCEDURE — 80048 BASIC METABOLIC PNL TOTAL CA: CPT

## 2017-05-09 PROCEDURE — 700102 HCHG RX REV CODE 250 W/ 637 OVERRIDE(OP): Performed by: ORTHOPAEDIC SURGERY

## 2017-05-09 PROCEDURE — A9270 NON-COVERED ITEM OR SERVICE: HCPCS | Performed by: HOSPITALIST

## 2017-05-09 PROCEDURE — G8988 SELF CARE GOAL STATUS: HCPCS | Mod: CI

## 2017-05-09 PROCEDURE — 97166 OT EVAL MOD COMPLEX 45 MIN: CPT

## 2017-05-09 PROCEDURE — 770020 HCHG ROOM/CARE - TELE (206)

## 2017-05-09 PROCEDURE — 700111 HCHG RX REV CODE 636 W/ 250 OVERRIDE (IP): Performed by: ORTHOPAEDIC SURGERY

## 2017-05-09 PROCEDURE — 85027 COMPLETE CBC AUTOMATED: CPT

## 2017-05-09 PROCEDURE — 700111 HCHG RX REV CODE 636 W/ 250 OVERRIDE (IP): Performed by: HOSPITALIST

## 2017-05-09 PROCEDURE — 700105 HCHG RX REV CODE 258: Performed by: HOSPITALIST

## 2017-05-09 PROCEDURE — 85007 BL SMEAR W/DIFF WBC COUNT: CPT

## 2017-05-09 PROCEDURE — 700102 HCHG RX REV CODE 250 W/ 637 OVERRIDE(OP)

## 2017-05-09 RX ORDER — SODIUM CHLORIDE 9 MG/ML
500 INJECTION, SOLUTION INTRAVENOUS ONCE
Status: COMPLETED | OUTPATIENT
Start: 2017-05-09 | End: 2017-05-09

## 2017-05-09 RX ADMIN — INSULIN GLARGINE 20 UNITS: 100 INJECTION, SOLUTION SUBCUTANEOUS at 22:35

## 2017-05-09 RX ADMIN — OMEPRAZOLE 20 MG: 20 CAPSULE, DELAYED RELEASE ORAL at 09:53

## 2017-05-09 RX ADMIN — INSULIN LISPRO 2 UNITS: 100 INJECTION, SOLUTION INTRAVENOUS; SUBCUTANEOUS at 06:07

## 2017-05-09 RX ADMIN — TRAZODONE HYDROCHLORIDE 300 MG: 100 TABLET ORAL at 22:27

## 2017-05-09 RX ADMIN — BUDESONIDE AND FORMOTEROL FUMARATE DIHYDRATE 2 PUFF: 160; 4.5 AEROSOL RESPIRATORY (INHALATION) at 22:28

## 2017-05-09 RX ADMIN — PREDNISONE 40 MG: 20 TABLET ORAL at 09:53

## 2017-05-09 RX ADMIN — LEVOFLOXACIN 750 MG: 750 TABLET, FILM COATED ORAL at 22:26

## 2017-05-09 RX ADMIN — KETOROLAC TROMETHAMINE 15 MG: 30 INJECTION, SOLUTION INTRAMUSCULAR at 06:04

## 2017-05-09 RX ADMIN — OXYCODONE HYDROCHLORIDE 10 MG: 10 TABLET ORAL at 22:27

## 2017-05-09 RX ADMIN — SIMVASTATIN 10 MG: 10 TABLET, FILM COATED ORAL at 22:27

## 2017-05-09 RX ADMIN — INSULIN LISPRO 2 UNITS: 100 INJECTION, SOLUTION INTRAVENOUS; SUBCUTANEOUS at 18:21

## 2017-05-09 RX ADMIN — TIOTROPIUM BROMIDE 1 CAPSULE: 18 CAPSULE ORAL; RESPIRATORY (INHALATION) at 09:56

## 2017-05-09 RX ADMIN — GABAPENTIN 600 MG: 300 CAPSULE ORAL at 16:55

## 2017-05-09 RX ADMIN — GABAPENTIN 600 MG: 300 CAPSULE ORAL at 09:52

## 2017-05-09 RX ADMIN — GABAPENTIN 600 MG: 300 CAPSULE ORAL at 22:27

## 2017-05-09 RX ADMIN — CELECOXIB 200 MG: 200 CAPSULE ORAL at 18:14

## 2017-05-09 RX ADMIN — BUDESONIDE AND FORMOTEROL FUMARATE DIHYDRATE 2 PUFF: 160; 4.5 AEROSOL RESPIRATORY (INHALATION) at 09:56

## 2017-05-09 RX ADMIN — SODIUM CHLORIDE 500 ML: 9 INJECTION, SOLUTION INTRAVENOUS at 18:13

## 2017-05-09 RX ADMIN — DULOXETINE HYDROCHLORIDE 60 MG: 60 CAPSULE, DELAYED RELEASE ORAL at 09:52

## 2017-05-09 RX ADMIN — OXYCODONE HYDROCHLORIDE 10 MG: 10 TABLET ORAL at 16:55

## 2017-05-09 RX ADMIN — MONTELUKAST SODIUM 10 MG: 10 TABLET, FILM COATED ORAL at 09:54

## 2017-05-09 RX ADMIN — ONDANSETRON 4 MG: 2 INJECTION INTRAMUSCULAR; INTRAVENOUS at 12:03

## 2017-05-09 RX ADMIN — ACETAMINOPHEN 1000 MG: 500 TABLET, FILM COATED ORAL at 18:14

## 2017-05-09 RX ADMIN — FERROUS SULFATE TAB 325 MG (65 MG ELEMENTAL FE) 325 MG: 325 (65 FE) TAB at 09:53

## 2017-05-09 RX ADMIN — INSULIN LISPRO 2 UNITS: 100 INJECTION, SOLUTION INTRAVENOUS; SUBCUTANEOUS at 22:35

## 2017-05-09 RX ADMIN — LEVOTHYROXINE SODIUM 75 MCG: 75 TABLET ORAL at 06:05

## 2017-05-09 ASSESSMENT — ENCOUNTER SYMPTOMS
SPEECH CHANGE: 0
ABDOMINAL PAIN: 0
TREMORS: 0
DIZZINESS: 0
HALLUCINATIONS: 0
CHILLS: 0
SHORTNESS OF BREATH: 1
PALPITATIONS: 0
VOMITING: 1
NAUSEA: 1
COUGH: 0
WHEEZING: 0
FLANK PAIN: 0
FEVER: 0
FOCAL WEAKNESS: 0
MYALGIAS: 1
WEAKNESS: 1
NERVOUS/ANXIOUS: 0

## 2017-05-09 ASSESSMENT — ACTIVITIES OF DAILY LIVING (ADL): TOILETING: INDEPENDENT

## 2017-05-09 ASSESSMENT — PAIN SCALES - GENERAL
PAINLEVEL_OUTOF10: 4
PAINLEVEL_OUTOF10: 8
PAINLEVEL_OUTOF10: 3
PAINLEVEL_OUTOF10: 8
PAINLEVEL_OUTOF10: 5
PAINLEVEL_OUTOF10: 3
PAINLEVEL_OUTOF10: 6

## 2017-05-09 ASSESSMENT — LIFESTYLE VARIABLES: SUBSTANCE_ABUSE: 0

## 2017-05-09 NOTE — THERAPY
"Occupational Therapy Evaluation completed.   Functional Status: Performed bed mobility with sba from a raised hob and use of bed rails, min a STS from eob with FWW cues for maintaining TTWB, pt took 2 Heriberto forward/backward with FWW but fatigues quickly and increased c/o R knee pain, limited standing activity tolerance.  Pt presents with impaired balance, limited knowledge of post op precautions, generalized strength and endurance deficits. Pt would benefit from acute and post acute skilled services prior to d/c to maximize pt's I and safety with ADLs/IADLs in presence of limited community support.  Plan of Care: Will benefit from Occupational Therapy 3 times per week  Discharge Recommendations:  Equipment: Will Continue to Assess for Equipment Needs. Post-acute therapy Discharge to a transitional care facility for continued skilled therapy services.    See \"Rehab Therapy-Acute\" Patient Summary Report for complete documentation.    "

## 2017-05-09 NOTE — PROGRESS NOTES
Bedside report received, assumed pt care @1496. Pt A&Ox4. She c/o pain in leg; declines medication at this time. Pt c/o vomiting; medicated pt per mar. POC discussed, she verbalized understanding. Pt surgical dressing on left leg intact with minimal drainage. Pt incontinent of loose stool. Pt unsteady and non weight bearing on left side. Call light within reach

## 2017-05-09 NOTE — PROGRESS NOTES
Pt with Hgb of 7.2. Notified APN of previous blood transfusions of Hgb < 8. APN clarified no standing order present and to page again if Hgb <7

## 2017-05-09 NOTE — RESPIRATORY CARE
COPD EDUCATION by COPD CLINICAL EDUCATOR  5/9/2017 at 7:03 AM by Idalia Lutz     Patient reviewed by COPD education team. Patient does not qualify for COPD program. She has our information from her previous admission

## 2017-05-09 NOTE — PROGRESS NOTES
Med rec complete per patient  Allergies reviewed    Patient does not carry a list and seemed like she agreed with what I was saying   Stated she is taking Bactrim due to having the flu and being here 5 times since April

## 2017-05-09 NOTE — DISCHARGE PLANNING
Pt discussed in morning rounds. Pt will require skilled stay before DC to home. Met with the pt at the bedside and discussed choice for skilled. Pt choice of [facility] faxed to [facility].

## 2017-05-09 NOTE — CARE PLAN
Problem: Safety  Goal: Will remain free from injury  Outcome: PROGRESSING AS EXPECTED  Fall precautions in place. Bed in lowest position. Non-skid socks in place. Personal possessions within reach. Mobility sign on door. Bed-alarm on. Call light within reach. Pt educated regarding fall prevention and states understanding.     Problem: Pain Management  Goal: Pain level will decrease to patient’s comfort goal  Outcome: PROGRESSING AS EXPECTED  PRN pain meds, relaxation techniques provided, repositioned pt

## 2017-05-09 NOTE — PROGRESS NOTES
Hospital Medicine Progress Note, Adult, Complex               Author: Byron Ward Date & Time created: 5/9/2017  12:29 PM     CC : 67 yo female hx DM, COPD chronic resp failure on home o2, obesity , HTN, diastolic chf admitted fall , left leg pain- dx fx.    Interval History:    N/V this morning after oral iron given. Notes increase abd distention - passing gas. Lives with her dog and cockatiel- unable to ambulate w/o assist - She is interested in [facility] for rehab.     Review of Systems:  Review of Systems   Constitutional: Negative for fever and chills.   HENT: Positive for congestion.    Respiratory: Positive for shortness of breath (chronic ). Negative for cough and wheezing.    Cardiovascular: Negative for chest pain and palpitations.   Gastrointestinal: Positive for nausea and vomiting. Negative for abdominal pain.   Genitourinary: Negative for dysuria and flank pain.   Musculoskeletal: Positive for myalgias and joint pain.   Neurological: Positive for weakness. Negative for dizziness, tremors, speech change and focal weakness.   Psychiatric/Behavioral: Negative for hallucinations and substance abuse. The patient is not nervous/anxious.        Physical Exam:  Physical Exam   Constitutional: She is oriented to person, place, and time. No distress.   Eyes: EOM are normal. No scleral icterus.   Neck: Neck supple.   Cardiovascular: Normal rate and regular rhythm.    No murmur heard.  Pulmonary/Chest: No stridor. She has no wheezes. She has no rales.   Abdominal: Soft. She exhibits distension. There is no tenderness.   Hypoactive bs   Musculoskeletal: She exhibits tenderness. She exhibits no edema.   Left leg wrapped.    Neurological: She is alert and oriented to person, place, and time. No cranial nerve deficit. Coordination abnormal.   Skin: Skin is warm and dry. She is not diaphoretic.   Psychiatric: She has a normal mood and affect. Her behavior is normal.       Labs:        Invalid input(s):  [id number]  Recent Labs      17   TROPONINI  <0.01   BNPBTYPENAT  160*     Recent Labs      17   0011   SODIUM  139  140  137   POTASSIUM  4.5  3.9  4.9   CHLORIDE  100  103  99   CO2  30  32  30   BUN  21  18  25*   CREATININE  1.51*  1.52*  1.80*   CALCIUM  8.7  8.5  8.0*     Recent Labs      17   0011   ALTSGPT  15   --    --    ASTSGOT  18   --    --    ALKPHOSPHAT  40   --    --    TBILIRUBIN  0.5   --    --    GLUCOSE  207*  109*  235*     Recent Labs      17   0012   RBC  2.73*  2.75*  2.52*   HEMOGLOBIN  7.8*  7.9*  7.2*   HEMATOCRIT  25.4*  26.0*  24.1*   PLATELETCT  258  250  214   PROTHROMBTM  13.8   --    --    APTT  29.6   --    --    INR  1.03   --    --      Recent Labs      17   0012   WBC  12.5*  12.3*  13.6*   NEUTSPOLYS  87.00*  73.50*  94.80*   LYMPHOCYTES  6.60*  15.20*  0.00*   MONOCYTES  5.10  9.20  4.30   EOSINOPHILS  0.20  0.70  0.90   BASOPHILS  0.30  0.40  0.00   ASTSGOT  18   --    --    ALTSGPT  15   --    --    ALKPHOSPHAT  40   --    --    TBILIRUBIN  0.5   --    --            Hemodynamics:  Temp (24hrs), Av.3 °C (97.4 °F), Min:36.1 °C (96.9 °F), Max:36.6 °C (97.9 °F)  Temperature: 36.6 °C (97.9 °F)  Pulse  Av.4  Min: 55  Max: 100   Blood Pressure : (!) 88/50 mmHg     Respiratory:    Respiration: 20, Pulse Oximetry: 91 %     Work Of Breathing / Effort: Moderate  RUL Breath Sounds: Diminished, RML Breath Sounds: Diminished, RLL Breath Sounds: Diminished, BIRD Breath Sounds: Diminished, LLL Breath Sounds: Diminished  Fluids:    Intake/Output Summary (Last 24 hours) at 17 1229  Last data filed at 17 0442   Gross per 24 hour   Intake      0 ml   Output    650 ml   Net   -650 ml     Weight: 110.8 kg (244 lb 4.3 oz)  GI/Nutrition:  Orders Placed This Encounter   Procedures   • Diet Order      Standing Status: Standing      Number of Occurrences: 1      Standing Expiration Date:      Order Specific Question:  Diet:     Answer:  Diabetic [3]     Medical Decision Making, by Problem:  Active Hospital Problems    Diagnosis   • *Tibia/fibula fracture [S82.209A, S82.409A]- post orif  IS, RT protocols   Pain control- prn oxycodone, IV ms  NWB to extremity per Ortho- will have skilled referral for rehab.   N/V - adb distention - suspect ileus   Start clears, prn iv anti emetics- xray abd if doesn't improve.  Was on atbs prior to admit- will check c diff if persistent w elev wbc.   • Acute on chronic respiratory failure with hypoxia and hypercapnia (CMS-HCC) [J96.21, J96.22]- copd, pneumonia  IS, wean fio2 as annia   • Pneumonia [J18.9]- klebsiella pneumoniae- recent dx. - improved symptoms.   cw levoquin .   • Acute exacerbation of chronic obstructive pulmonary disease (COPD) (CMS-HCC) [J44.1]  Prednisone, RT, bronchodilators.    • DM type 2, uncontrolled, with renal complications (CMS-HCC) [E11.29, E11.65] uncontrolled   ISS, add long acting lantus if cont'd poor control.    • Iron deficiency anemia [D50.9]  Monitor hgb post op.  Iron supplements.    • Chronic kidney disease, stage 3 [N18.3] increased Cr.   Fu renal fxn, lytes uop.  Start ivfs if poor intake.  Hx of afib- controlled  Monitor   Hx of diastolic chf- w/o decompensation .  Monitor fluids.     Discussed with SS plan of care.     Labs reviewed, Medications reviewed and Radiology images reviewed  Mckeon catheter: No Mckeon      DVT Prophylaxis: Contraindicated - High bleeding risk      Antibiotics: Treating active infection/contamination beyond 24 hours perioperative coverage

## 2017-05-09 NOTE — DISCHARGE PLANNING
Received call from [place] with [facility] they can accept pending bed available, maybe tomorrow. Notified TY Bravo via phone.

## 2017-05-10 VITALS
DIASTOLIC BLOOD PRESSURE: 64 MMHG | SYSTOLIC BLOOD PRESSURE: 108 MMHG | WEIGHT: 230.38 LBS | TEMPERATURE: 97.9 F | OXYGEN SATURATION: 91 % | BODY MASS INDEX: 39.33 KG/M2 | HEART RATE: 78 BPM | RESPIRATION RATE: 19 BRPM | HEIGHT: 64 IN

## 2017-05-10 LAB
ANION GAP SERPL CALC-SCNC: 5 MMOL/L (ref 0–11.9)
BASOPHILS # BLD AUTO: 0 % (ref 0–1.8)
BASOPHILS # BLD: 0 K/UL (ref 0–0.12)
BUN SERPL-MCNC: 31 MG/DL (ref 8–22)
CALCIUM SERPL-MCNC: 7.9 MG/DL (ref 8.5–10.5)
CHLORIDE SERPL-SCNC: 104 MMOL/L (ref 96–112)
CO2 SERPL-SCNC: 27 MMOL/L (ref 20–33)
CREAT SERPL-MCNC: 1.55 MG/DL (ref 0.5–1.4)
EOSINOPHIL # BLD AUTO: 0 K/UL (ref 0–0.51)
EOSINOPHIL NFR BLD: 0 % (ref 0–6.9)
ERYTHROCYTE [DISTWIDTH] IN BLOOD BY AUTOMATED COUNT: 57.6 FL (ref 35.9–50)
GFR SERPL CREATININE-BSD FRML MDRD: 33 ML/MIN/1.73 M 2
GLUCOSE BLD-MCNC: 112 MG/DL (ref 65–99)
GLUCOSE BLD-MCNC: 117 MG/DL (ref 65–99)
GLUCOSE SERPL-MCNC: 132 MG/DL (ref 65–99)
HCT VFR BLD AUTO: 25.1 % (ref 37–47)
HGB BLD-MCNC: 7.5 G/DL (ref 12–16)
IMM GRANULOCYTES # BLD AUTO: 0.03 K/UL (ref 0–0.11)
IMM GRANULOCYTES NFR BLD AUTO: 0.4 % (ref 0–0.9)
LYMPHOCYTES # BLD AUTO: 0.15 K/UL (ref 1–4.8)
LYMPHOCYTES NFR BLD: 1.9 % (ref 22–41)
MCH RBC QN AUTO: 28.6 PG (ref 27–33)
MCHC RBC AUTO-ENTMCNC: 29.9 G/DL (ref 33.6–35)
MCV RBC AUTO: 95.8 FL (ref 81.4–97.8)
MONOCYTES # BLD AUTO: 0.28 K/UL (ref 0–0.85)
MONOCYTES NFR BLD AUTO: 3.6 % (ref 0–13.4)
NEUTROPHILS # BLD AUTO: 7.32 K/UL (ref 2–7.15)
NEUTROPHILS NFR BLD: 94.1 % (ref 44–72)
NRBC # BLD AUTO: 0 K/UL
NRBC BLD AUTO-RTO: 0 /100 WBC
PLATELET # BLD AUTO: 201 K/UL (ref 164–446)
PMV BLD AUTO: 9.4 FL (ref 9–12.9)
POTASSIUM SERPL-SCNC: 5.4 MMOL/L (ref 3.6–5.5)
RBC # BLD AUTO: 2.62 M/UL (ref 4.2–5.4)
SODIUM SERPL-SCNC: 136 MMOL/L (ref 135–145)
WBC # BLD AUTO: 7.8 K/UL (ref 4.8–10.8)

## 2017-05-10 PROCEDURE — 80048 BASIC METABOLIC PNL TOTAL CA: CPT

## 2017-05-10 PROCEDURE — 700111 HCHG RX REV CODE 636 W/ 250 OVERRIDE (IP): Performed by: HOSPITALIST

## 2017-05-10 PROCEDURE — 700102 HCHG RX REV CODE 250 W/ 637 OVERRIDE(OP): Performed by: ORTHOPAEDIC SURGERY

## 2017-05-10 PROCEDURE — 700102 HCHG RX REV CODE 250 W/ 637 OVERRIDE(OP): Performed by: HOSPITALIST

## 2017-05-10 PROCEDURE — 99239 HOSP IP/OBS DSCHRG MGMT >30: CPT | Performed by: HOSPITALIST

## 2017-05-10 PROCEDURE — A9270 NON-COVERED ITEM OR SERVICE: HCPCS | Performed by: HOSPITALIST

## 2017-05-10 PROCEDURE — 85025 COMPLETE CBC W/AUTO DIFF WBC: CPT

## 2017-05-10 PROCEDURE — G8978 MOBILITY CURRENT STATUS: HCPCS | Mod: CL

## 2017-05-10 PROCEDURE — 82962 GLUCOSE BLOOD TEST: CPT

## 2017-05-10 PROCEDURE — 97162 PT EVAL MOD COMPLEX 30 MIN: CPT

## 2017-05-10 PROCEDURE — 36415 COLL VENOUS BLD VENIPUNCTURE: CPT

## 2017-05-10 PROCEDURE — A9270 NON-COVERED ITEM OR SERVICE: HCPCS | Performed by: ORTHOPAEDIC SURGERY

## 2017-05-10 PROCEDURE — G8979 MOBILITY GOAL STATUS: HCPCS | Mod: CJ

## 2017-05-10 RX ORDER — PSEUDOEPHEDRINE HCL 30 MG
100 TABLET ORAL 2 TIMES DAILY
Qty: 60 CAP | Status: ON HOLD
Start: 2017-05-10 | End: 2017-05-26

## 2017-05-10 RX ORDER — POLYETHYLENE GLYCOL 3350 17 G/17G
17 POWDER, FOR SOLUTION ORAL 2 TIMES DAILY PRN
Refills: 3 | Status: ON HOLD
Start: 2017-05-10 | End: 2017-05-26

## 2017-05-10 RX ORDER — OXYCODONE HYDROCHLORIDE 10 MG/1
10-20 TABLET ORAL EVERY 6 HOURS PRN
Qty: 20 TAB | Refills: 0 | Status: SHIPPED | OUTPATIENT
Start: 2017-05-10 | End: 2017-05-10

## 2017-05-10 RX ORDER — GABAPENTIN 600 MG/1
600 TABLET ORAL 3 TIMES DAILY
Qty: 270 TAB | Refills: 3
Start: 2017-05-10 | End: 2017-08-02 | Stop reason: SDUPTHER

## 2017-05-10 RX ORDER — BUDESONIDE AND FORMOTEROL FUMARATE DIHYDRATE 160; 4.5 UG/1; UG/1
2 AEROSOL RESPIRATORY (INHALATION) 2 TIMES DAILY
Start: 2017-05-10 | End: 2017-07-18 | Stop reason: SDUPTHER

## 2017-05-10 RX ORDER — OMEPRAZOLE 20 MG/1
20 CAPSULE, DELAYED RELEASE ORAL DAILY
Qty: 30 CAP | Status: ON HOLD
Start: 2017-05-10 | End: 2017-06-22

## 2017-05-10 RX ORDER — SCOLOPAMINE TRANSDERMAL SYSTEM 1 MG/1
1 PATCH, EXTENDED RELEASE TRANSDERMAL
Qty: 4 PATCH | Refills: 3
Start: 2017-05-10 | End: 2017-06-02

## 2017-05-10 RX ORDER — LEVOFLOXACIN 500 MG/1
750 TABLET, FILM COATED ORAL DAILY
Qty: 5 TAB | Refills: 0
Start: 2017-05-10 | End: 2017-05-13

## 2017-05-10 RX ORDER — CELECOXIB 200 MG/1
200 CAPSULE ORAL 2 TIMES DAILY WITH MEALS
Qty: 60 CAP
Start: 2017-05-10 | End: 2017-06-02

## 2017-05-10 RX ORDER — PREDNISONE 10 MG/1
10 TABLET ORAL DAILY
Start: 2017-05-10 | End: 2017-05-10

## 2017-05-10 RX ORDER — AMOXICILLIN 250 MG
1 CAPSULE ORAL DAILY
Qty: 30 TAB | Refills: 0 | Status: ON HOLD
Start: 2017-05-10 | End: 2017-05-26

## 2017-05-10 RX ADMIN — LEVOTHYROXINE SODIUM 75 MCG: 75 TABLET ORAL at 06:18

## 2017-05-10 RX ADMIN — BUDESONIDE AND FORMOTEROL FUMARATE DIHYDRATE 2 PUFF: 160; 4.5 AEROSOL RESPIRATORY (INHALATION) at 08:32

## 2017-05-10 RX ADMIN — PREDNISONE 40 MG: 20 TABLET ORAL at 08:32

## 2017-05-10 RX ADMIN — OMEPRAZOLE 20 MG: 20 CAPSULE, DELAYED RELEASE ORAL at 08:32

## 2017-05-10 RX ADMIN — MONTELUKAST SODIUM 10 MG: 10 TABLET, FILM COATED ORAL at 08:32

## 2017-05-10 RX ADMIN — OXYCODONE HYDROCHLORIDE 10 MG: 10 TABLET ORAL at 08:33

## 2017-05-10 RX ADMIN — ACETAMINOPHEN 1000 MG: 500 TABLET, FILM COATED ORAL at 06:16

## 2017-05-10 RX ADMIN — DULOXETINE HYDROCHLORIDE 60 MG: 60 CAPSULE, DELAYED RELEASE ORAL at 08:32

## 2017-05-10 RX ADMIN — GABAPENTIN 600 MG: 300 CAPSULE ORAL at 08:32

## 2017-05-10 RX ADMIN — CELECOXIB 200 MG: 200 CAPSULE ORAL at 07:32

## 2017-05-10 RX ADMIN — ACETAMINOPHEN 1000 MG: 500 TABLET, FILM COATED ORAL at 12:00

## 2017-05-10 RX ADMIN — TIOTROPIUM BROMIDE 1 CAPSULE: 18 CAPSULE ORAL; RESPIRATORY (INHALATION) at 08:32

## 2017-05-10 ASSESSMENT — PAIN SCALES - GENERAL
PAINLEVEL_OUTOF10: 0
PAINLEVEL_OUTOF10: 0
PAINLEVEL_OUTOF10: 1
PAINLEVEL_OUTOF10: 3
PAINLEVEL_OUTOF10: 5
PAINLEVEL_OUTOF10: 0

## 2017-05-10 ASSESSMENT — GAIT ASSESSMENTS
DISTANCE (FEET): 2
ASSISTIVE DEVICE: FRONT WHEEL WALKER
GAIT LEVEL OF ASSIST: STAND BY ASSIST

## 2017-05-10 NOTE — DISCHARGE SUMMARY
CHIEF COMPLAINT ON ADMISSION  Chief Complaint   Patient presents with   • Leg Pain     Patient complains of left lower leg pain.       CODE STATUS  Full Code    HPI & HOSPITAL COURSE  This is a 66 y.o. year old female here with hx of COPD, Chronic respiratory failure, 4 L by nasal cannula at baseline., Chronic kidney disease, stage III, Diabetes, insulin-dependent, with complications of neuropathy, History of atrial fibrillation, hypertension  admitted with Fall with left leg pain.  Diagnosis of Trimalleolar left ankle fracture.  Post Open reduction and internal fixation, left trimalleolar ankle fracture, with fixation of posterior lip on 5-8-17.  She had ileus w diarrhea resolved.  Recommended for toe touch weight bearing 8 weeks by orthopedics.  Debilitated, she lives with her pets and will require SNF for rehab. Had COPD flare up with improved symptoms with bronchodilators and steroids. Plan continue with respiratory therapy .  Recent diagnosis of Klebsiella Pneumoniae pneumonia - stable, afebrile . To complete additional 3 days of antibiotics. Anemia , Renal function has been stable. Plan to continue with iron supplements.     Therefore, she is discharged  stable condition for further post-acute management.     SPECIFIC OUTPATIENT FOLLOW-UP    Follow up with orthopedics dr. Gtz in 2 weeks.     DISCHARGE PROBLEM LIST  Principal Problem:    Tibia/fibula fracture POA: Yes  Active Problems:    Acute on chronic respiratory failure with hypoxia and hypercapnia (CMS-[facility]) POA: Yes    DM type 2, uncontrolled, with renal complications (CMS-[facility]) POA: Yes    Iron deficiency anemia POA: Yes    Pneumonia POA: Yes    Acute exacerbation of chronic obstructive pulmonary disease (COPD) (CMS-[facility]) POA: Yes    Morbid obesity (CMS-[facility]) POA: Yes    Chronic kidney disease, stage 3 POA: Yes    EDITH (obstructive sleep apnea) POA: Yes    Chronic respiratory failure (CMS-[facility]) POA: Yes  Resolved Problems:    * No resolved hospital  problems. *  Hypertension   Diastolic heart failure w/o decompensation.    FOLLOW UP  Future Appointments  Date Time Provider Department Center   5/11/2017 8:00 AM LAB SKILLED NURSING [facility] None   5/31/2017 10:30 AM TELEMEDICINE [facility]   6/15/2017 11:20 AM Kavitha Mccall M.D. [facility]   6/26/2017 11:40 AM [facility] None     Mickie Lawson M.D.  [address] [place] [id number] [phone]    In 4 weeks      Rico Gtz M.D.  [address]  [address]  [place] [address]  [phone]    In 2 weeks        MEDICATIONS ON DISCHARGE   Priya Callahan   Home Medication Instructions [id number]    Printed on:05/10/17 1341   Medication Information                      acetaminophen 325 MG Tab  Take 2 Tabs by mouth every 6 hours as needed for Mild Pain or Fever.             albuterol-ipratropium (COMBIVENT)  MCG/ACT Aerosol  Inhale 2 Puffs by mouth every 6 hours as needed for Shortness of Breath.             amlodipine (NORVASC) 10 MG Tab  Take 1 Tab by mouth every day.             budesonide-formoterol (SYMBICORT) 160-4.5 MCG/ACT Aerosol  Inhale 2 Puffs by mouth 2 Times a Day.             carvedilol (COREG) 3.125 MG Tab  Take 1 Tab by mouth 2 times a day, with meals.             celecoxib (CELEBREX) 200 MG Cap  Take 1 Cap by mouth 2 times a day, with meals.             cyclobenzaprine (FLEXERIL) 10 MG Tab  TAKE 1 TABLET BY MOUTH THREE TIMES DAILY AS NEEDED FOR MILD PAIN             docusate sodium 100 MG Cap  Take 100 mg by mouth 2 Times a Day.             duloxetine (CYMBALTA) 60 MG Cap DR Particles delayed-release capsule  Take 1 Cap by mouth every day.             escitalopram (LEXAPRO) 10 MG Tab  Take 1 Tab by mouth every day.             ferrous gluconate (FERGON) 324 (38 FE) MG Tab  Take 1 Tab by mouth 2 times a day, with meals.             gabapentin (NEURONTIN) 600 MG tablet  Take 1 Tab by mouth 3 times a day.              hydrocortisone (CORTEF) 20 MG Tab  Take 1 Tab by mouth every day.             insulin glargine (LANTUS) 100 UNIT/ML Solution  Inject 20 Units as instructed every evening.             insulin lispro (HUMALOG) 100 UNIT/ML Solution  Inject 2-9 Units as instructed 4 Times a Day,Before Meals and at Bedtime.             levofloxacin (LEVAQUIN) 500 MG tablet  Take 1.5 Tabs by mouth every day for 3 days.             levothyroxine (SYNTHROID) 75 MCG Tab  TAKE 1 TABLET BY MOUTH DAILY             montelukast (SINGULAIR) 10 MG Tab  Take 1 Tab by mouth every day.             multivitamin (THERAGRAN) Tab  Take 1 Tab by mouth every day.             omeprazole (PRILOSEC) 20 MG delayed-release capsule  Take 1 Cap by mouth every day.             polyethylene glycol/lytes (MIRALAX) Pack  Take 1 Packet by mouth 2 times a day as needed (if sennosides and/or docusate ineffective or not ordered).             scopolamine (TRANSDERM-SCOP) 1.5 MG/3DAYS PATCH 72 HR  Apply 1 Patch to skin as directed every 72 hours as needed (Nausea/Vomiting if ondansetron, dexamethasone, diphenhydramine, and/or haloperidol ineffective or not ordered).             senna-docusate (PERICOLACE OR SENOKOT S) 8.6-50 MG Tab  Take 1 Tab by mouth every day.             simvastatin (ZOCOR) 10 MG Tab  TAKE 1 TABLET BY MOUTH EVERY EVENING             tiotropium (SPIRIVA) 18 MCG Cap  Inhale 1 Cap by mouth every day.             trazodone (DESYREL) 100 MG Tab  TAKE 3 TABLETS BY MOUTH NIGHTLY AT BEDTIME AS NEEDED FOR SLEEP                 DIET  Orders Placed This Encounter   Procedures   • DIET ORDER     Standing Status: Standing      Number of Occurrences: 1      Standing Expiration Date:      Order Specific Question:  Diet:     Answer:  Cardiac [6]     Order Specific Question:  Diet:     Answer:  Diabetic [3]   • DISCONTINUE DIET TRAY     Standing Status: Standing      Number of Occurrences: 1      Standing Expiration Date:        ACTIVITY    Touch weight bearing 8  weeks      LINES, DRAINS, AND WOUNDS  This is an automated list. Peripheral IVs will be removed prior to discharge.       Surgical Incision  Incision Left Ankle (Active)   Wound Bed Other (comment) 5/10/2017  8:00 AM   Drainage  Minimal;Serosanguinous 5/10/2017  8:00 AM   Periwound Skin Other (Comments) 5/10/2017  8:00 AM   Daily - Wound Closure Not Assessed 5/10/2017  8:00 AM   Dressing Options Dry Gauze;Elastic Wrap 5/10/2017  8:00 AM   Dressing Status / Change Dry;Intact;Observed;Old Drainage 5/10/2017  8:00 AM                  MENTAL STATUS ON TRANSFER  Level of Consciousness: Alert  Orientation : Oriented x 4  Speech: Speech Clear    CONSULTATIONS    Ortho Dr. Gtz    PROCEDURES    Post Open reduction and internal fixation, left trimalleolar ankle fracture, with fixation of posterior lip on 5-8-17    LABORATORY  Lab Results   Component Value Date/Time    SODIUM 136 05/10/2017 03:26 AM    POTASSIUM 5.4 05/10/2017 03:26 AM    CHLORIDE 104 05/10/2017 03:26 AM    CO2 27 05/10/2017 03:26 AM    GLUCOSE 132* 05/10/2017 03:26 AM    BUN 31* 05/10/2017 03:26 AM    CREATININE 1.55* 05/10/2017 03:26 AM        Lab Results   Component Value Date/Time    WBC 7.8 05/10/2017 03:26 AM    HEMOGLOBIN 7.5* 05/10/2017 03:26 AM    HEMATOCRIT 25.1* 05/10/2017 03:26 AM    PLATELET COUNT 201 05/10/2017 03:26 AM        Total time of the discharge process exceeds 45 minutes.

## 2017-05-10 NOTE — DISCHARGE PLANNING
Pt accepted by [facility]. Bed available, transport arranged for 14:00. MD updated and will dictate. Bedside Rn updated. Transfer packet and Cobra will be completed and placed with pts hard chart.

## 2017-05-10 NOTE — PROGRESS NOTES
BP cuff reading varying on upper extremities and not consistent with manual readings. Reading accurate when taken on right leg. Will pass along.

## 2017-05-10 NOTE — DISCHARGE PLANNING
Follow up with [facility] spoke to Maurilio, wanting to know if they have a bed. [facility] can pickup at 1400. Notified TY Bravo via phone.

## 2017-05-10 NOTE — PROGRESS NOTES
Report received, assumed pt care, assessment complete. VSS, A&O X4, no complaints of pain. Discussed POC, pt verbalizes understanding. Pt is being repositioned q2hr. No further concerns at this time. Bed in low position, bed alarm on, call light in reach.

## 2017-05-10 NOTE — CARE PLAN
Problem: Safety  Goal: Will remain free from injury  Outcome: PROGRESSING AS EXPECTED  Fall precautions in place. Bed in lowest position. Non-skid socks in place. Personal possessions within reach. Mobility sign on door. Bed-alarm on. Call light within reach. Pt educated regarding fall prevention and states understanding.     Problem: Pain Management  Goal: Pain level will decrease to patient’s comfort goal  Outcome: PROGRESSING AS EXPECTED  PRN pain meds, relaxation techniques provided, will continue to assess pain Q4hrs or as needed

## 2017-05-10 NOTE — DISCHARGE INSTRUCTIONS
Open Reduction, Internal Fixation (ORIF), Generic  Usually, if bones are broken (fractured) and are out of place, unstable, or may become out of place, surgery is needed. This surgery is called an open reduction and internal fixation (ORIF). Open reduction means that the area of the fracture is opened up so the surgeon can see it. Internal fixation means that screws, pins, or fixation devices are used to hold the bone pieces in place.  LET YOUR CAREGIVER KNOW ABOUT:   · Allergies.  · Medicines taken, including herbs, eyedrops, over-the-counter medicines, and creams.  · Use of steroids (by mouth or creams).  · Previous problems with anesthetics or numbing medicines.  · History of bleeding or blood problems.  · History of blood clots.  · Possibility of pregnancy, if this applies.  · Previous surgery.  · Other health problems.  RISKS AND COMPLICATIONS   All surgery is associated with risks. Some of these risks are:  · Excessive bleeding.  · Infection.  · Imperfect results with loss of joint function.  BEFORE THE PROCEDURE   Usually, surgery is performed shortly after the injury. It is important to provide information to your caregiver after your injury.  AFTER THE PROCEDURE   After surgery, you will be taken to a recovery area where a nurse will monitor your progress. You may have a long, narrow tube(catheter) in the bladder following surgery that helps you pass your water. When awake, stable, taking fluids well, and without complications, you will be returned to your room. You will receive physical therapy and other care. Physical therapy is done until you are doing well and your caregiver feels it is safe for you to go home or to an extended care facility.  Following surgery, the bones may be protected with a cast. The type of casting depends on where the fracture was. Casts are generally left in place for about 5 to 6 weeks. During this time, your caregiver will follow your progress. X-rays may be taken during  healing to make sure the bones stay in place.  HOME CARE INSTRUCTIONS   · You or your child may resume normal diet and activities as directed or allowed.  · Put ice on the injured area.  · Put ice in a plastic bag.  · Place a towel between the skin and the bag.  · Leave the ice on for 15-20 minutes at a time, 3-4 times a day, for the first 2 days following surgery.  · Change bandages (dressings) if necessary or as directed.  · If given a plaster or fiberglass cast:  · Do not try to scratch the skin under the cast using sharp or pointed objects.  · Check the skin around the cast every day. You may put lotion on any red or sore areas.  · Keep the cast dry and clean.  · Do not put pressure on any part of the cast or splint until it is fully hardened.  · The cast or splint can be protected during bathing with a plastic bag. Do not lower the cast or splint into water.  · Only take over-the-counter or prescription medicines for pain, discomfort, or fever as directed by your caregiver.  · Use crutches as directed and do not exercise the leg unless instructed.  · If the bones get out of position (displaced), it may eventually lead to arthritis and lasting disability. Problems can follow even the best of care. Follow the directions of your caregiver.  · Follow all instructions given by your caregiver, make and keep follow-up appointments, and use crutches as directed.  SEEK IMMEDIATE MEDICAL CARE IF:   · There is redness, swelling, numbness, or increasing pain in the wound.  · There is pus coming from the wound.  · You or your child has an oral temperature above 102° F (38.9° C), not controlled by medicine.  · A bad smell is coming from the wound or dressing.  · The wound breaks open (edges not staying together) after stitches (sutures) or staples have been removed.  · The skin or nails below the injury turn blue or gray, or feel cold or numb.  · There is severe pain under the cast or in the foot.  If there is not a window  in the cast for observing the wound, a discharge or minor bleeding may show up as a stain on the outside of the cast. Report these findings to your caregiver.  MAKE SURE YOU:   · Understand these instructions.  · Will watch your condition.  · Will get help right away if you are not doing well or gets worse.  Document Released: 12/29/2007 Document Revised: 03/11/2013 Document Reviewed: 12/05/2008  ExitCare® Patient Information ©2014 [employer].  Discharge Instructions    [facility] with escort. Discharged via wheelchair, hospital escort: Yes.  Special equipment needed: Not Applicable    Be sure to schedule a follow-up appointment with your primary care doctor or any specialists as instructed.     Discharge Plan:   Diet Plan: Discussed  Activity Level: Discussed  Confirmed Follow up Appointment: Patient to Call and Schedule Appointment  Confirmed Symptoms Management: Discussed  Medication Reconciliation Updated: Yes  Influenza Vaccine Indication: Not indicated: Previously immunized this influenza season and > 8 years of age    I understand that a diet low in cholesterol, fat, and sodium is recommended for good health. Unless I have been given specific instructions below for another diet, I accept this instruction as my diet prescription.   Other diet: heart healthy    Special Instructions: Discharge instructions for the Orthopedic Patient    Follow up with Primary Care Physician within 2 weeks of discharge to home, regarding:  Review of medications and diagnostic testing.  Surveillance for medical complications.  Workup and treatment of osteoporosis, if appropriate.     -Is this a Joint Replacement patient? No    -Is this patient being discharged with medication to prevent blood clots?  No    · Is patient discharged on Warfarin / Coumadin?   No     · Is patient Post Blood Transfusion?  No    Depression / Suicide Risk    As you are discharged from this [facility], it is  important to learn how to keep safe from harming yourself.    Recognize the warning signs:  · Abrupt changes in personality, positive or negative- including increase in energy   · Giving away possessions  · Change in eating patterns- significant weight changes-  positive or negative  · Change in sleeping patterns- unable to sleep or sleeping all the time   · Unwillingness or inability to communicate  · Depression  · Unusual sadness, discouragement and loneliness  · Talk of wanting to die  · Neglect of personal appearance   · Rebelliousness- reckless behavior  · Withdrawal from people/activities they love  · Confusion- inability to concentrate     If you or a loved one observes any of these behaviors or has concerns about self-harm, here's what you can do:  · Talk about it- your feelings and reasons for harming yourself  · Remove any means that you might use to hurt yourself (examples: pills, rope, extension cords, firearm)  · Get professional help from the community (Mental Health, Substance Abuse, psychological counseling)  · Do not be alone:Call your Safe Contact- someone whom you trust who will be there for you.  · Call your local CRISIS HOTLINE 490-6158 or 068-813-9109  · Call your local Children's Mobile Crisis Response Team [place] (612) 614-2017 or www.Mamina [place]ola  · Call the toll free National Suicide Prevention Hotlines   · National Suicide Prevention Lifeline 341-706-SLRO (9523)  · National Hope Line Network 800-SUICIDE (147-5798)

## 2017-05-10 NOTE — PROGRESS NOTES
Pt to transfer to [facility], aware, report given to accepting SHITAL Malik. PIV and tele monitor removed. Yellow packet with transport. Pt off the floor via wheelchair, all belongings with pt.

## 2017-05-10 NOTE — PROGRESS NOTES
"   Orthopaedic Progress Note    Interval changes:  Doing well post op  Scant sanguinous exudate noted on outer splint  Doing well     ROS - Patient denies any new issues.  Pain well controlled.    Blood pressure 88/54, pulse 80, temperature 37.1 °C (98.8 °F), resp. rate 16, height 1.626 m (5' 4.02\"), weight 110.8 kg (244 lb 4.3 oz), SpO2 90 %, not currently breastfeeding.      Patient seen and examined  No acute distress  Breathing non labored  RRR  LLE Surgical splint/dressing clean and intact with trace/min sanguinous exudate noted. Patient clearly moves all toes.  Sensation is intact but still has prior existing neuropathy without change.  Cap refill < 2 sec at toes.         Recent Labs      05/07/17 2038  05/08/17   0611  05/09/17   0012   WBC  12.5*  12.3*  13.6*   RBC  2.73*  2.75*  2.52*   HEMOGLOBIN  7.8*  7.9*  7.2*   HEMATOCRIT  25.4*  26.0*  24.1*   MCV  93.0  94.5  95.6   MCH  28.6  28.7  28.6   MCHC  30.7*  30.4*  29.9*   RDW  62.7*  58.0*  59.1*   PLATELETCT  258  250  214   MPV  8.6*  9.0  9.3       Active Hospital Problems    Diagnosis   • Tibia/fibula fracture [S82.209A, S82.409A]     Priority: High   • Acute on chronic respiratory failure with hypoxia and hypercapnia (CMS-[facility]) [J96.21, J96.22]     Priority: High   • Pneumonia [J18.9]     Priority: Medium   • Acute exacerbation of chronic obstructive pulmonary disease (COPD) (CMS-[facility]) [J44.1]     Priority: Medium   • DM type 2, uncontrolled, with renal complications (CMS-[facility]) [E11.29, E11.65]     Priority: Medium   • Iron deficiency anemia [D50.9]     Priority: Medium   • Chronic kidney disease, stage 3 [N18.3]     Priority: Low       Assessment/Plan:  Doing well post op  Cleared by ortho for DC to SNF  POD#1 S/P Open reduction and internal fixation, left trimalleolar ankle fracture, with fixation of posterior lip  Wt bearing status - TTWB for 8 weeks on LLE  Wound care/Drains - splint in place until follow up  Future Procedures - none planned "   Sutures/Staples out- 10-14 days post operatively  PT/OT-initiated  Antibiotics: levaquin 750mg po QD  DVT Prophylaxis- TEDS/SCDs/Foot pumps  [place]-none  Case Coordination for Discharge Planning - Disposition SNF

## 2017-05-11 ENCOUNTER — HOSPITAL ENCOUNTER (OUTPATIENT)
Dept: LAB | Facility: MEDICAL CENTER | Age: 67
End: 2017-05-11
Attending: INTERNAL MEDICINE
Payer: COMMERCIAL

## 2017-05-11 LAB
ANION GAP SERPL CALC-SCNC: 4 MMOL/L (ref 0–11.9)
BASOPHILS # BLD AUTO: 0.1 % (ref 0–1.8)
BASOPHILS # BLD: 0.01 K/UL (ref 0–0.12)
BUN SERPL-MCNC: 29 MG/DL (ref 8–22)
CALCIUM SERPL-MCNC: 8 MG/DL (ref 8.5–10.5)
CHLORIDE SERPL-SCNC: 104 MMOL/L (ref 96–112)
CO2 SERPL-SCNC: 28 MMOL/L (ref 20–33)
CREAT SERPL-MCNC: 1.27 MG/DL (ref 0.5–1.4)
EOSINOPHIL # BLD AUTO: 0 K/UL (ref 0–0.51)
EOSINOPHIL NFR BLD: 0 % (ref 0–6.9)
ERYTHROCYTE [DISTWIDTH] IN BLOOD BY AUTOMATED COUNT: 55.8 FL (ref 35.9–50)
GFR SERPL CREATININE-BSD FRML MDRD: 42 ML/MIN/1.73 M 2
GLUCOSE SERPL-MCNC: 104 MG/DL (ref 65–99)
HCT VFR BLD AUTO: 25 % (ref 37–47)
HGB BLD-MCNC: 7.6 G/DL (ref 12–16)
IMM GRANULOCYTES # BLD AUTO: 0.05 K/UL (ref 0–0.11)
IMM GRANULOCYTES NFR BLD AUTO: 0.7 % (ref 0–0.9)
LYMPHOCYTES # BLD AUTO: 0.45 K/UL (ref 1–4.8)
LYMPHOCYTES NFR BLD: 6.5 % (ref 22–41)
MCH RBC QN AUTO: 28.7 PG (ref 27–33)
MCHC RBC AUTO-ENTMCNC: 30.4 G/DL (ref 33.6–35)
MCV RBC AUTO: 94.3 FL (ref 81.4–97.8)
MONOCYTES # BLD AUTO: 0.64 K/UL (ref 0–0.85)
MONOCYTES NFR BLD AUTO: 9.3 % (ref 0–13.4)
NEUTROPHILS # BLD AUTO: 5.74 K/UL (ref 2–7.15)
NEUTROPHILS NFR BLD: 83.4 % (ref 44–72)
NRBC # BLD AUTO: 0 K/UL
NRBC BLD AUTO-RTO: 0 /100 WBC
PLATELET # BLD AUTO: 207 K/UL (ref 164–446)
PMV BLD AUTO: 9.7 FL (ref 9–12.9)
POTASSIUM SERPL-SCNC: 4.6 MMOL/L (ref 3.6–5.5)
RBC # BLD AUTO: 2.65 M/UL (ref 4.2–5.4)
SODIUM SERPL-SCNC: 136 MMOL/L (ref 135–145)
WBC # BLD AUTO: 6.9 K/UL (ref 4.8–10.8)

## 2017-05-11 NOTE — PROGRESS NOTES
No change from initial shift assessment.  Bed alarm remains on   X Size Of Lesion In Cm (Optional): 0

## 2017-05-13 LAB
BACTERIA BLD CULT: NORMAL
BACTERIA BLD CULT: NORMAL
EKG IMPRESSION: NORMAL
FERRITIN SERPL-MCNC: 109.7 NG/ML (ref 10–291)
HCT VFR BLD AUTO: 25.4 % (ref 37–47)
HGB BLD-MCNC: 7.6 G/DL (ref 12–16)
IRON SATN MFR SERPL: 10 % (ref 15–55)
IRON SERPL-MCNC: 28 UG/DL (ref 40–170)
SIGNIFICANT IND 70042: NORMAL
SIGNIFICANT IND 70042: NORMAL
SITE SITE: NORMAL
SITE SITE: NORMAL
SOURCE SOURCE: NORMAL
SOURCE SOURCE: NORMAL
TIBC SERPL-MCNC: 272 UG/DL (ref 250–450)
VIT B12 SERPL-MCNC: 365 PG/ML (ref 211–911)

## 2017-05-15 LAB
25(OH)D3 SERPL-MCNC: 33 NG/ML (ref 30–100)
CHOLEST SERPL-MCNC: 115 MG/DL (ref 100–199)
CK MB SERPL-MCNC: 1.5 NG/ML (ref 0–5)
HDLC SERPL-MCNC: 34 MG/DL
LDLC SERPL CALC-MCNC: 69 MG/DL
TRIGL SERPL-MCNC: 62 MG/DL (ref 0–149)
TSH SERPL DL<=0.005 MIU/L-ACNC: 1.42 UIU/ML (ref 0.3–3.7)

## 2017-05-18 NOTE — CONSULTS
DATE OF SERVICE:  05/17/2017    DATE OF CONSULTATION:  05/17/2017.    CHIEF COMPLAINT:  Left ankle pain.    HISTORY OF PRESENT ILLNESS:  The patient is a pleasant 66-year-old female with   multiple medical comorbidities _____ left ankle pain when she got up and   rolled her ankle and felt a snap and collapsed on to her left side with   inability to bear weight and stand on left ankle.  She was brought to [facility].  Upon seeing the patient, she reiterates a history,   she has pain approximately 8/10 in severity and aching pain in her ankle range   towards the leg, mainly she bumps _____ somewhat amenable to splint.  Closed   reduction and splinting in the emergency department, ice, elevation, narcotic   pain medication.    PAST MEDICAL HISTORY:  COPD, chronic kidney disease, chronic respiratory   failure, diabetes and atrial fibrillation.    PAST SURGICAL HISTORY:  She denies any prior trauma or surgery about the left   lower extremity.    MEDICATIONS:  Polypharmacy, please see chart.    ALLERGIES:  VITAMIN C, KEFLEX, CODEINE, LYRICA AND SUDAFED.    FAMILY HISTORY:  Negative for bleeding disorders or anesthetic reactions.    SOCIAL HISTORY:  She is a former smoker.  She denies current alcohol or drug   use.    REVIEW OF SYSTEMS:  Thorough review of systems is performed and is negative   except for past medical history and history of present illness.    PHYSICAL EXAMINATION:  GENERAL:  She is obese and appearing older than her stated age.  HEENT:  Normocephalic, atraumatic.  NEUROLOGIC:  Cranial nerves II-XII are grossly intact.  CARDIOVASCULAR:  2+ distal pulses.  EXTREMITIES:  No cyanosis, clubbing, or edema.  PULMONARY:  Breathing comfortably on room air without labor.  ABDOMEN:  Obese, otherwise unremarkable.  SKIN:  No rashes, jaundice, or cyanosis.  SPINE:  Clinically midline and nontender.  MUSCULOSKELETAL:  Bilateral upper extremities, right lower extremity, no   tenderness to  palpation, pain with range of motion.  Left lower extremity, she   has no tenderness to palpation whatsoever about her knee.  She is in   well-padded splint.  Splint was peeled back.  Skin is swollen, but still   wrinkles.  No blisters, no lacerations.  NEUROLOGIC:  She has no pain with passive stretch of her toes.  She fires EHL,   FHL with good strength.  Sensation intact to light touch, L4, L5, S1   dermatomes.    IMAGING:  Multiple views of the left ankle pre and post-reduction demonstrate   a trimalleolar ankle fracture with complete dislocation, now slightly   subluxated, status post reduction by the emergency department.    ASSESSMENT:  A 66-year-old female with multiple medical comorbidities with   left trimalleolar ankle fracture subluxation.    PLAN:  I educated the patient regarding diagnosis.  She understands my   recommendation and transferred to Dr. Gtz for open reduction and   internal fixation of the left ankle.  She understands risks, benefits and   alternatives of the procedure and wishes to proceed.       ____________________________________     MD CHERIE Mckee / DMITRY    DD:  05/17/2017 15:50:41  DT:  05/17/2017 19:33:03    D#:  [id number]  Job#:  [id number]

## 2017-05-19 LAB
ANION GAP SERPL CALC-SCNC: 5 MMOL/L (ref 0–11.9)
ANISOCYTOSIS BLD QL SMEAR: ABNORMAL
BASOPHILS # BLD AUTO: 0.3 % (ref 0–1.8)
BASOPHILS # BLD: 0.03 K/UL (ref 0–0.12)
BUN SERPL-MCNC: 23 MG/DL (ref 8–22)
CALCIUM SERPL-MCNC: 8.4 MG/DL (ref 8.5–10.5)
CHLORIDE SERPL-SCNC: 105 MMOL/L (ref 96–112)
CO2 SERPL-SCNC: 28 MMOL/L (ref 20–33)
COMMENT 1642: NORMAL
CREAT SERPL-MCNC: 1.36 MG/DL (ref 0.5–1.4)
DACRYOCYTES BLD QL SMEAR: NORMAL
EOSINOPHIL # BLD AUTO: 0.19 K/UL (ref 0–0.51)
EOSINOPHIL NFR BLD: 1.6 % (ref 0–6.9)
ERYTHROCYTE [DISTWIDTH] IN BLOOD BY AUTOMATED COUNT: 57 FL (ref 35.9–50)
GFR SERPL CREATININE-BSD FRML MDRD: 39 ML/MIN/1.73 M 2
GLUCOSE SERPL-MCNC: 196 MG/DL (ref 65–99)
HCT VFR BLD AUTO: 23 % (ref 37–47)
HGB BLD-MCNC: 6.9 G/DL (ref 12–16)
IMM GRANULOCYTES # BLD AUTO: 0.15 K/UL (ref 0–0.11)
IMM GRANULOCYTES NFR BLD AUTO: 1.3 % (ref 0–0.9)
LYMPHOCYTES # BLD AUTO: 1.03 K/UL (ref 1–4.8)
LYMPHOCYTES NFR BLD: 8.6 % (ref 22–41)
MCH RBC QN AUTO: 28.2 PG (ref 27–33)
MCHC RBC AUTO-ENTMCNC: 29.7 G/DL (ref 33.6–35)
MCV RBC AUTO: 95 FL (ref 81.4–97.8)
MICROCYTES BLD QL SMEAR: ABNORMAL
MONOCYTES # BLD AUTO: 1.15 K/UL (ref 0–0.85)
MONOCYTES NFR BLD AUTO: 9.6 % (ref 0–13.4)
MORPHOLOGY BLD-IMP: NORMAL
NEUTROPHILS # BLD AUTO: 9.41 K/UL (ref 2–7.15)
NEUTROPHILS NFR BLD: 78.6 % (ref 44–72)
NRBC # BLD AUTO: 0.02 K/UL
NRBC BLD AUTO-RTO: 0.2 /100 WBC
OVALOCYTES BLD QL SMEAR: NORMAL
PLATELET # BLD AUTO: 237 K/UL (ref 164–446)
PLATELET BLD QL SMEAR: NORMAL
PMV BLD AUTO: 9.5 FL (ref 9–12.9)
POIKILOCYTOSIS BLD QL SMEAR: NORMAL
POLYCHROMASIA BLD QL SMEAR: NORMAL
POTASSIUM SERPL-SCNC: 4.4 MMOL/L (ref 3.6–5.5)
RBC # BLD AUTO: 2.41 M/UL (ref 4.2–5.4)
RBC BLD AUTO: PRESENT
SODIUM SERPL-SCNC: 138 MMOL/L (ref 135–145)
TARGETS BLD QL SMEAR: NORMAL
WBC # BLD AUTO: 12 K/UL (ref 4.8–10.8)

## 2017-05-21 LAB
ANION GAP SERPL CALC-SCNC: 7 MMOL/L (ref 0–11.9)
BASOPHILS # BLD AUTO: 0.4 % (ref 0–1.8)
BASOPHILS # BLD: 0.04 K/UL (ref 0–0.12)
BUN SERPL-MCNC: 24 MG/DL (ref 8–22)
CALCIUM SERPL-MCNC: 8.6 MG/DL (ref 8.5–10.5)
CHLORIDE SERPL-SCNC: 107 MMOL/L (ref 96–112)
CO2 SERPL-SCNC: 27 MMOL/L (ref 20–33)
CREAT SERPL-MCNC: 1.28 MG/DL (ref 0.5–1.4)
EOSINOPHIL # BLD AUTO: 0.3 K/UL (ref 0–0.51)
EOSINOPHIL NFR BLD: 3 % (ref 0–6.9)
ERYTHROCYTE [DISTWIDTH] IN BLOOD BY AUTOMATED COUNT: 55.1 FL (ref 35.9–50)
GFR SERPL CREATININE-BSD FRML MDRD: 42 ML/MIN/1.73 M 2
GLUCOSE SERPL-MCNC: 147 MG/DL (ref 65–99)
HCT VFR BLD AUTO: 23.9 % (ref 37–47)
HGB BLD-MCNC: 7.3 G/DL (ref 12–16)
IMM GRANULOCYTES # BLD AUTO: 0.23 K/UL (ref 0–0.11)
IMM GRANULOCYTES NFR BLD AUTO: 2.3 % (ref 0–0.9)
LYMPHOCYTES # BLD AUTO: 1.4 K/UL (ref 1–4.8)
LYMPHOCYTES NFR BLD: 13.8 % (ref 22–41)
MCH RBC QN AUTO: 28.6 PG (ref 27–33)
MCHC RBC AUTO-ENTMCNC: 30.5 G/DL (ref 33.6–35)
MCV RBC AUTO: 93.7 FL (ref 81.4–97.8)
MONOCYTES # BLD AUTO: 0.74 K/UL (ref 0–0.85)
MONOCYTES NFR BLD AUTO: 7.3 % (ref 0–13.4)
NEUTROPHILS # BLD AUTO: 7.45 K/UL (ref 2–7.15)
NEUTROPHILS NFR BLD: 73.2 % (ref 44–72)
NRBC # BLD AUTO: 0.06 K/UL
NRBC BLD AUTO-RTO: 0.6 /100 WBC
PLATELET # BLD AUTO: 248 K/UL (ref 164–446)
PMV BLD AUTO: 9.4 FL (ref 9–12.9)
POTASSIUM SERPL-SCNC: 4.8 MMOL/L (ref 3.6–5.5)
RBC # BLD AUTO: 2.55 M/UL (ref 4.2–5.4)
SODIUM SERPL-SCNC: 141 MMOL/L (ref 135–145)
WBC # BLD AUTO: 10.2 K/UL (ref 4.8–10.8)

## 2017-05-22 LAB
GRAM STN SPEC: NORMAL
SIGNIFICANT IND 70042: NORMAL
SITE SITE: NORMAL
SOURCE SOURCE: NORMAL

## 2017-05-23 LAB
ANION GAP SERPL CALC-SCNC: 8 MMOL/L (ref 0–11.9)
BACTERIA SPEC RESP CULT: NORMAL
BASOPHILS # BLD AUTO: 0.3 % (ref 0–1.8)
BASOPHILS # BLD: 0.03 K/UL (ref 0–0.12)
BUN SERPL-MCNC: 29 MG/DL (ref 8–22)
CALCIUM SERPL-MCNC: 9.2 MG/DL (ref 8.5–10.5)
CHLORIDE SERPL-SCNC: 106 MMOL/L (ref 96–112)
CO2 SERPL-SCNC: 29 MMOL/L (ref 20–33)
CREAT SERPL-MCNC: 1.35 MG/DL (ref 0.5–1.4)
EOSINOPHIL # BLD AUTO: 0.33 K/UL (ref 0–0.51)
EOSINOPHIL NFR BLD: 3.5 % (ref 0–6.9)
ERYTHROCYTE [DISTWIDTH] IN BLOOD BY AUTOMATED COUNT: 54.4 FL (ref 35.9–50)
GFR SERPL CREATININE-BSD FRML MDRD: 39 ML/MIN/1.73 M 2
GLUCOSE SERPL-MCNC: 77 MG/DL (ref 65–99)
GRAM STN SPEC: NORMAL
HCT VFR BLD AUTO: 23.6 % (ref 37–47)
HGB BLD-MCNC: 7 G/DL (ref 12–16)
IMM GRANULOCYTES # BLD AUTO: 0.21 K/UL (ref 0–0.11)
IMM GRANULOCYTES NFR BLD AUTO: 2.2 % (ref 0–0.9)
LYMPHOCYTES # BLD AUTO: 1.74 K/UL (ref 1–4.8)
LYMPHOCYTES NFR BLD: 18.5 % (ref 22–41)
MCH RBC QN AUTO: 27.8 PG (ref 27–33)
MCHC RBC AUTO-ENTMCNC: 29.7 G/DL (ref 33.6–35)
MCV RBC AUTO: 93.7 FL (ref 81.4–97.8)
MONOCYTES # BLD AUTO: 0.68 K/UL (ref 0–0.85)
MONOCYTES NFR BLD AUTO: 7.2 % (ref 0–13.4)
NEUTROPHILS # BLD AUTO: 6.4 K/UL (ref 2–7.15)
NEUTROPHILS NFR BLD: 68.3 % (ref 44–72)
NRBC # BLD AUTO: 0.07 K/UL
NRBC BLD AUTO-RTO: 0.7 /100 WBC
PLATELET # BLD AUTO: 262 K/UL (ref 164–446)
PMV BLD AUTO: 9 FL (ref 9–12.9)
POTASSIUM SERPL-SCNC: 5.6 MMOL/L (ref 3.6–5.5)
RBC # BLD AUTO: 2.52 M/UL (ref 4.2–5.4)
SIGNIFICANT IND 70042: NORMAL
SITE SITE: NORMAL
SODIUM SERPL-SCNC: 143 MMOL/L (ref 135–145)
SOURCE SOURCE: NORMAL
WBC # BLD AUTO: 9.4 K/UL (ref 4.8–10.8)

## 2017-05-24 LAB
HCT VFR BLD AUTO: 23.8 % (ref 37–47)
HGB BLD-MCNC: 7.1 G/DL (ref 12–16)

## 2017-05-25 ENCOUNTER — PATIENT OUTREACH (OUTPATIENT)
Dept: HEALTH INFORMATION MANAGEMENT | Facility: OTHER | Age: 67
End: 2017-05-25

## 2017-05-25 LAB
HCT VFR BLD AUTO: 23.7 % (ref 37–47)
HGB BLD-MCNC: 6.8 G/DL (ref 12–16)

## 2017-05-25 NOTE — PROGRESS NOTES
LSW outreach phone call to pt to determine if pt completed Medicaid application. No answer. LSW left voicemail for return phone call.    LSW reviewed pt's chart and contact [facility] to verify that pt was currently admitted to SNF. Pt is currently admitted.

## 2017-05-26 ENCOUNTER — HOSPITAL ENCOUNTER (OUTPATIENT)
Facility: MEDICAL CENTER | Age: 67
End: 2017-05-27
Attending: INTERNAL MEDICINE | Admitting: INTERNAL MEDICINE
Payer: MEDICARE

## 2017-05-26 ENCOUNTER — RESOLUTE PROFESSIONAL BILLING HOSPITAL PROF FEE (OUTPATIENT)
Dept: HOSPITALIST | Facility: MEDICAL CENTER | Age: 67
End: 2017-05-26
Payer: MEDICARE

## 2017-05-26 LAB
ABO GROUP BLD: NORMAL
BARCODED ABORH UBTYP: 9500
BARCODED PRD CODE UBPRD: NORMAL
BARCODED UNIT NUM UBUNT: NORMAL
BLD GP AB SCN SERPL QL: NORMAL
COMPONENT R 8504R: NORMAL
FERRITIN SERPL-MCNC: 844.4 NG/ML (ref 10–291)
FOLATE SERPL-MCNC: >23.7 NG/ML
GLUCOSE BLD-MCNC: 173 MG/DL (ref 65–99)
HCT VFR BLD AUTO: 22.5 % (ref 37–47)
HGB BLD-MCNC: 6.5 G/DL (ref 12–16)
IRON SATN MFR SERPL: 40 % (ref 15–55)
IRON SERPL-MCNC: 99 UG/DL (ref 40–170)
PRODUCT TYPE UPROD: NORMAL
RH BLD: NORMAL
TIBC SERPL-MCNC: 245 UG/DL (ref 250–450)
UNIT STATUS USTAT: NORMAL
VIT B12 SERPL-MCNC: >1500 PG/ML (ref 211–911)

## 2017-05-26 PROCEDURE — 82746 ASSAY OF FOLIC ACID SERUM: CPT

## 2017-05-26 PROCEDURE — 86901 BLOOD TYPING SEROLOGIC RH(D): CPT

## 2017-05-26 PROCEDURE — 82962 GLUCOSE BLOOD TEST: CPT

## 2017-05-26 PROCEDURE — P9016 RBC LEUKOCYTES REDUCED: HCPCS

## 2017-05-26 PROCEDURE — 86900 BLOOD TYPING SEROLOGIC ABO: CPT

## 2017-05-26 PROCEDURE — 700102 HCHG RX REV CODE 250 W/ 637 OVERRIDE(OP): Performed by: INTERNAL MEDICINE

## 2017-05-26 PROCEDURE — 82728 ASSAY OF FERRITIN: CPT

## 2017-05-26 PROCEDURE — 99220 PR INITIAL OBSERVATION CARE,LEVL III: CPT | Performed by: INTERNAL MEDICINE

## 2017-05-26 PROCEDURE — 83540 ASSAY OF IRON: CPT

## 2017-05-26 PROCEDURE — A9270 NON-COVERED ITEM OR SERVICE: HCPCS | Performed by: INTERNAL MEDICINE

## 2017-05-26 PROCEDURE — 82668 ASSAY OF ERYTHROPOIETIN: CPT

## 2017-05-26 PROCEDURE — 83550 IRON BINDING TEST: CPT

## 2017-05-26 PROCEDURE — 306588 SLEEVE,VASO CALF MED: Performed by: INTERNAL MEDICINE

## 2017-05-26 PROCEDURE — G0378 HOSPITAL OBSERVATION PER HR: HCPCS

## 2017-05-26 PROCEDURE — 86850 RBC ANTIBODY SCREEN: CPT

## 2017-05-26 PROCEDURE — 86923 COMPATIBILITY TEST ELECTRIC: CPT

## 2017-05-26 PROCEDURE — 36430 TRANSFUSION BLD/BLD COMPNT: CPT

## 2017-05-26 PROCEDURE — 96372 THER/PROPH/DIAG INJ SC/IM: CPT

## 2017-05-26 PROCEDURE — 82607 VITAMIN B-12: CPT

## 2017-05-26 RX ORDER — AMOXICILLIN 250 MG
1 CAPSULE ORAL DAILY
Status: DISCONTINUED | OUTPATIENT
Start: 2017-05-26 | End: 2017-05-26

## 2017-05-26 RX ORDER — BUDESONIDE AND FORMOTEROL FUMARATE DIHYDRATE 160; 4.5 UG/1; UG/1
2 AEROSOL RESPIRATORY (INHALATION) 2 TIMES DAILY
Status: DISCONTINUED | OUTPATIENT
Start: 2017-05-26 | End: 2017-05-27 | Stop reason: HOSPADM

## 2017-05-26 RX ORDER — INSULIN GLARGINE 100 [IU]/ML
20 INJECTION, SOLUTION SUBCUTANEOUS NIGHTLY
Status: DISCONTINUED | OUTPATIENT
Start: 2017-05-26 | End: 2017-05-27 | Stop reason: HOSPADM

## 2017-05-26 RX ORDER — ACETAMINOPHEN 325 MG/1
650 TABLET ORAL EVERY 6 HOURS PRN
Status: DISCONTINUED | OUTPATIENT
Start: 2017-05-26 | End: 2017-05-27 | Stop reason: HOSPADM

## 2017-05-26 RX ORDER — TRAZODONE HYDROCHLORIDE 150 MG/1
300 TABLET ORAL NIGHTLY
COMMUNITY
End: 2017-08-02 | Stop reason: SDUPTHER

## 2017-05-26 RX ORDER — TRAZODONE HYDROCHLORIDE 50 MG/1
150 TABLET ORAL
Status: DISCONTINUED | OUTPATIENT
Start: 2017-05-26 | End: 2017-05-26

## 2017-05-26 RX ORDER — CYCLOBENZAPRINE HCL 10 MG
5 TABLET ORAL 3 TIMES DAILY PRN
Status: DISCONTINUED | OUTPATIENT
Start: 2017-05-26 | End: 2017-05-27 | Stop reason: HOSPADM

## 2017-05-26 RX ORDER — GABAPENTIN 300 MG/1
600 CAPSULE ORAL 3 TIMES DAILY
Status: DISCONTINUED | OUTPATIENT
Start: 2017-05-26 | End: 2017-05-27 | Stop reason: HOSPADM

## 2017-05-26 RX ORDER — OMEPRAZOLE 20 MG/1
20 CAPSULE, DELAYED RELEASE ORAL DAILY
Status: DISCONTINUED | OUTPATIENT
Start: 2017-05-27 | End: 2017-05-27 | Stop reason: HOSPADM

## 2017-05-26 RX ORDER — HYDROCORTISONE 20 MG/1
20 TABLET ORAL DAILY
Status: DISCONTINUED | OUTPATIENT
Start: 2017-05-27 | End: 2017-05-27 | Stop reason: HOSPADM

## 2017-05-26 RX ORDER — CARVEDILOL 3.12 MG/1
3.12 TABLET ORAL 2 TIMES DAILY WITH MEALS
Status: DISCONTINUED | OUTPATIENT
Start: 2017-05-27 | End: 2017-05-27 | Stop reason: HOSPADM

## 2017-05-26 RX ORDER — AMOXICILLIN 250 MG
2 CAPSULE ORAL 2 TIMES DAILY
Status: DISCONTINUED | OUTPATIENT
Start: 2017-05-26 | End: 2017-05-27 | Stop reason: HOSPADM

## 2017-05-26 RX ORDER — BISACODYL 10 MG
10 SUPPOSITORY, RECTAL RECTAL
Status: DISCONTINUED | OUTPATIENT
Start: 2017-05-26 | End: 2017-05-27 | Stop reason: HOSPADM

## 2017-05-26 RX ORDER — SIMVASTATIN 20 MG
10 TABLET ORAL EVERY EVENING
Status: DISCONTINUED | OUTPATIENT
Start: 2017-05-26 | End: 2017-05-27 | Stop reason: HOSPADM

## 2017-05-26 RX ORDER — ESCITALOPRAM OXALATE 10 MG/1
10 TABLET ORAL DAILY
Status: DISCONTINUED | OUTPATIENT
Start: 2017-05-27 | End: 2017-05-27 | Stop reason: HOSPADM

## 2017-05-26 RX ORDER — TRAZODONE HYDROCHLORIDE 50 MG/1
300 TABLET ORAL
Status: DISCONTINUED | OUTPATIENT
Start: 2017-05-26 | End: 2017-05-27 | Stop reason: HOSPADM

## 2017-05-26 RX ORDER — POLYETHYLENE GLYCOL 3350 17 G/17G
1 POWDER, FOR SOLUTION ORAL 2 TIMES DAILY PRN
Status: DISCONTINUED | OUTPATIENT
Start: 2017-05-26 | End: 2017-05-26

## 2017-05-26 RX ORDER — OXYCODONE HYDROCHLORIDE 10 MG/1
10 TABLET ORAL EVERY 4 HOURS PRN
Status: DISCONTINUED | OUTPATIENT
Start: 2017-05-26 | End: 2017-05-27 | Stop reason: HOSPADM

## 2017-05-26 RX ORDER — TIOTROPIUM BROMIDE 18 UG/1
1 CAPSULE ORAL; RESPIRATORY (INHALATION) DAILY
Status: DISCONTINUED | OUTPATIENT
Start: 2017-05-27 | End: 2017-05-27 | Stop reason: HOSPADM

## 2017-05-26 RX ORDER — DEXTROSE MONOHYDRATE 25 G/50ML
25 INJECTION, SOLUTION INTRAVENOUS
Status: DISCONTINUED | OUTPATIENT
Start: 2017-05-26 | End: 2017-05-27 | Stop reason: HOSPADM

## 2017-05-26 RX ORDER — POLYETHYLENE GLYCOL 3350 17 G/17G
1 POWDER, FOR SOLUTION ORAL
Status: DISCONTINUED | OUTPATIENT
Start: 2017-05-26 | End: 2017-05-27 | Stop reason: HOSPADM

## 2017-05-26 RX ORDER — DULOXETIN HYDROCHLORIDE 60 MG/1
60 CAPSULE, DELAYED RELEASE ORAL DAILY
Status: DISCONTINUED | OUTPATIENT
Start: 2017-05-27 | End: 2017-05-27 | Stop reason: HOSPADM

## 2017-05-26 RX ORDER — LEVOTHYROXINE SODIUM 0.07 MG/1
75 TABLET ORAL
Status: DISCONTINUED | OUTPATIENT
Start: 2017-05-27 | End: 2017-05-27 | Stop reason: HOSPADM

## 2017-05-26 RX ORDER — AMLODIPINE BESYLATE 10 MG/1
10 TABLET ORAL DAILY
Status: DISCONTINUED | OUTPATIENT
Start: 2017-05-27 | End: 2017-05-27 | Stop reason: HOSPADM

## 2017-05-26 RX ORDER — OXYCODONE HCL 20 MG/1
20 TABLET, FILM COATED, EXTENDED RELEASE ORAL EVERY 4 HOURS PRN
COMMUNITY
End: 2017-06-02

## 2017-05-26 RX ORDER — DOCUSATE SODIUM 100 MG/1
100 CAPSULE, LIQUID FILLED ORAL 2 TIMES DAILY
Status: DISCONTINUED | OUTPATIENT
Start: 2017-05-27 | End: 2017-05-27 | Stop reason: HOSPADM

## 2017-05-26 RX ORDER — ALBUTEROL SULFATE 90 UG/1
1-2 AEROSOL, METERED RESPIRATORY (INHALATION) EVERY 6 HOURS PRN
Status: DISCONTINUED | OUTPATIENT
Start: 2017-05-26 | End: 2017-05-27 | Stop reason: HOSPADM

## 2017-05-26 RX ORDER — MONTELUKAST SODIUM 10 MG/1
10 TABLET ORAL DAILY
Status: DISCONTINUED | OUTPATIENT
Start: 2017-05-27 | End: 2017-05-27 | Stop reason: HOSPADM

## 2017-05-26 RX ORDER — FERROUS GLUCONATE 324(38)MG
324 TABLET ORAL 2 TIMES DAILY WITH MEALS
Status: DISCONTINUED | OUTPATIENT
Start: 2017-05-26 | End: 2017-05-27 | Stop reason: HOSPADM

## 2017-05-26 RX ADMIN — GABAPENTIN 600 MG: 300 CAPSULE ORAL at 20:58

## 2017-05-26 RX ADMIN — FERROUS GLUCONATE 324 MG: 324 TABLET ORAL at 20:59

## 2017-05-26 RX ADMIN — INSULIN GLARGINE 20 UNITS: 100 INJECTION, SOLUTION SUBCUTANEOUS at 22:16

## 2017-05-26 RX ADMIN — CYCLOBENZAPRINE 5 MG: 10 TABLET, FILM COATED ORAL at 20:59

## 2017-05-26 RX ADMIN — BUDESONIDE AND FORMOTEROL FUMARATE DIHYDRATE 2 PUFF: 160; 4.5 AEROSOL RESPIRATORY (INHALATION) at 20:59

## 2017-05-26 RX ADMIN — INSULIN LISPRO 1 UNITS: 100 INJECTION, SOLUTION INTRAVENOUS; SUBCUTANEOUS at 22:16

## 2017-05-26 RX ADMIN — TRAZODONE HYDROCHLORIDE 300 MG: 50 TABLET ORAL at 22:20

## 2017-05-26 RX ADMIN — OXYCODONE HYDROCHLORIDE 10 MG: 10 TABLET ORAL at 22:20

## 2017-05-26 ASSESSMENT — PAIN SCALES - WONG BAKER
WONGBAKER_NUMERICALRESPONSE: HURTS EVEN MORE
WONGBAKER_NUMERICALRESPONSE: HURTS EVEN MORE

## 2017-05-26 ASSESSMENT — LIFESTYLE VARIABLES
EVER_SMOKED: YES
ALCOHOL_USE: NO

## 2017-05-26 ASSESSMENT — PAIN SCALES - GENERAL: PAINLEVEL_OUTOF10: 6

## 2017-05-26 NOTE — CONSULTS
DATE OF SERVICE:  05/08/2017    CHIEF COMPLAINT:  Left ankle pain.    HISTORY OF PRESENT ILLNESS:  The patient is a pleasant 66-year-old female with   multiple medical comorbidities _____ left ankle pain when she got up and   rolled her ankle and felt a snap and collapsed on to her left side with   inability to bear weight and stand on left ankle.  She was brought to [facility].  Upon seeing the patient, she reiterates a history,   she has pain approximately 8/10 in severity and aching pain in her ankle range   towards the leg, mainly she bumps _____ somewhat amenable to splint.  Closed   reduction and splinting in the emergency department, ice, elevation, narcotic   pain medication.    PAST MEDICAL HISTORY:  COPD, chronic kidney disease, chronic respiratory   failure, diabetes and atrial fibrillation.    PAST SURGICAL HISTORY:  She denies any prior trauma or surgery about the left   lower extremity.    MEDICATIONS:  Polypharmacy, please see chart.    ALLERGIES:  VITAMIN C, KEFLEX, CODEINE, LYRICA AND SUDAFED.    FAMILY HISTORY:  Negative for bleeding disorders or anesthetic reactions.    SOCIAL HISTORY:  She is a former smoker.  She denies current alcohol or drug   use.    REVIEW OF SYSTEMS:  Thorough review of systems is performed and is negative   except for past medical history and history of present illness.    PHYSICAL EXAMINATION:  GENERAL:  She is obese and appearing older than her stated age.  HEENT:  Normocephalic, atraumatic.  NEUROLOGIC:  Cranial nerves II-XII are grossly intact.  CARDIOVASCULAR:  2+ distal pulses.  EXTREMITIES:  No cyanosis, clubbing, or edema.  PULMONARY:  Breathing comfortably on room air without labor.  ABDOMEN:  Obese, otherwise unremarkable.  SKIN:  No rashes, jaundice, or cyanosis.  SPINE:  Clinically midline and nontender.  MUSCULOSKELETAL:  Bilateral upper extremities, right lower extremity, no   tenderness to palpation, pain with range of motion.  Left  lower extremity, she   has no tenderness to palpation whatsoever about her knee.  She is in   well-padded splint.  Splint was peeled back.  Skin is swollen, but still   wrinkles.  No blisters, no lacerations.  NEUROLOGIC:  She has no pain with passive stretch of her toes.  She fires EHL,   FHL with good strength.  Sensation intact to light touch, L4, L5, S1   dermatomes.    IMAGING:  Multiple views of the left ankle pre and post-reduction demonstrate   a trimalleolar ankle fracture with complete dislocation, now slightly   subluxated, status post reduction by the emergency department.    ASSESSMENT:  A 66-year-old female with multiple medical comorbidities with   left trimalleolar ankle fracture subluxation.    PLAN:  I educated the patient regarding diagnosis.  She understands my   recommendation and transferred to Dr. Gtz for open reduction and   internal fixation of the left ankle.  She understands risks, benefits and   alternatives of the procedure and wishes to proceed.       ____________________________________     MD CHERIE Mckee / DMITRY    DD:  05/17/2017 15:50:41  DT:  05/17/2017 19:33:03    D#:  [id number]  Job#:  [id number]

## 2017-05-26 NOTE — IP AVS SNAPSHOT
" Home Care Instructions                                                                                                                  Name:Priya Callahan  Medical Record Number:[id number]  CSN: [id number]    YOB: 1950   Age: 66 y.o.  Sex: female  HT:1.626 m (5' 4\") WT: 104.5 kg (230 lb 6.1 oz)          Admit Date: 5/26/2017     Discharge Date:   Today's Date: 5/27/2017  Attending Doctor:  Eugene Castro M.D.                  Allergies:  Vitamin c; Keflex; Codeine; Lyrica; and Sudafed            Discharge Instructions       Discharge Instructions    Discharged to home by car with escort. Discharged via wheelchair, hospital escort: Yes.  Special equipment needed: Not Applicable    Be sure to schedule a follow-up appointment with your primary care doctor or any specialists as instructed.     Discharge Plan:   Diet Plan: Discussed  Activity Level: Discussed  Confirmed Follow up Appointment: Patient to Call and Schedule Appointment  Confirmed Symptoms Management: Discussed  Medication Reconciliation Updated: Yes  Influenza Vaccine Indication: Not indicated: Previously immunized this influenza season and > 8 years of age    I understand that a diet low in cholesterol, fat, and sodium is recommended for good health. Unless I have been given specific instructions below for another diet, I accept this instruction as my diet prescription.   Other diet:     Special Instructions: None    · Is patient discharged on Warfarin / Coumadin?   No     · Is patient Post Blood Transfusion?  No    Depression / Suicide Risk    As [facility], it is important to learn how to keep safe from harming yourself.    Recognize the warning signs:  · Abrupt changes in personality, positive or negative- including increase in energy   · Giving away possessions  · Change in eating patterns- significant weight changes-  positive or negative  · Change in sleeping patterns- unable to sleep or " sleeping all the time   · Unwillingness or inability to communicate  · Depression  · Unusual sadness, discouragement and loneliness  · Talk of wanting to die  · Neglect of personal appearance   · Rebelliousness- reckless behavior  · Withdrawal from people/activities they love  · Confusion- inability to concentrate     If you or a loved one observes any of these behaviors or has concerns about self-harm, here's what you can do:  · Talk about it- your feelings and reasons for harming yourself  · Remove any means that you might use to hurt yourself (examples: pills, rope, extension cords, firearm)  · Get professional help from the community (Mental Health, Substance Abuse, psychological counseling)  · Do not be alone:Call your Safe Contact- someone whom you trust who will be there for you.  · Call your local CRISIS HOTLINE 247-8057 or 735-513-4089  · Call your local Children's Mobile Crisis Response Team [place] (622) 518-7052 or www.[employer]  · Call the toll free National Suicide Prevention Hotlines   · National Suicide Prevention Lifeline 204-750-PLCO (7600)  · National Hope Line Network 800-SUICIDE (821-9828)    Anemia, Nonspecific  Anemia is a condition in which the concentration of red blood cells or hemoglobin in the blood is below normal. Hemoglobin is a substance in red blood cells that carries oxygen to the tissues of the body. Anemia results in not enough oxygen reaching these tissues.   CAUSES   Common causes of anemia include:   · Excessive bleeding. Bleeding may be internal or external. This includes excessive bleeding from periods (in women) or from the intestine.    · Poor nutrition.    · Chronic kidney, thyroid, and liver disease.   · Bone marrow disorders that decrease red blood cell production.  · Cancer and treatments for cancer.  · HIV, AIDS, and their treatments.  · Spleen problems that increase red blood cell destruction.  · Blood disorders.  · Excess destruction of red blood cells  due to infection, medicines, and autoimmune disorders.  SIGNS AND SYMPTOMS   · Minor weakness.    · Dizziness.    · Headache.  · Palpitations.    · Shortness of breath, especially with exercise.    · Paleness.  · Cold sensitivity.  · Indigestion.  · Nausea.  · Difficulty sleeping.  · Difficulty concentrating.  Symptoms may occur suddenly or they may develop slowly.   DIAGNOSIS   Additional blood tests are often needed. These help your health care provider determine the best treatment. Your health care provider will check your stool for blood and look for other causes of blood loss.   TREATMENT   Treatment varies depending on the cause of the anemia. Treatment can include:   · Supplements of iron, vitamin B12, or folic acid.    · Hormone medicines.    · A blood transfusion. This may be needed if blood loss is severe.    · Hospitalization. This may be needed if there is significant continual blood loss.    · Dietary changes.  · Spleen removal.  HOME CARE INSTRUCTIONS  Keep all follow-up appointments. It often takes many weeks to correct anemia, and having your health care provider check on your condition and your response to treatment is very important.  SEEK IMMEDIATE MEDICAL CARE IF:   · You develop extreme weakness, shortness of breath, or chest pain.    · You become dizzy or have trouble concentrating.  · You develop heavy vaginal bleeding.    · You develop a rash.    · You have bloody or black, tarry stools.    · You faint.    · You vomit up blood.    · You vomit repeatedly.    · You have abdominal pain.  · You have a fever or persistent symptoms for more than 2-3 days.    · You have a fever and your symptoms suddenly get worse.    · You are dehydrated.    MAKE SURE YOU:  · Understand these instructions.  · Will watch your condition.  · Will get help right away if you are not doing well or get worse.     This information is not intended to replace advice given to you by your health care provider. Make sure you  discuss any questions you have with your health care provider.     Document Released: 01/25/2006 Document Revised: 08/20/2014 Document Reviewed: 06/13/2014  [employer] Interactive Patient Education ©2016 [employer]        Your appointments     May 31, 2017 10:30 AM   Telemedicine Clinic New Patient with Dragan Vela M.D., TELEMEDICINE [place], [facility]   [facility]    [address] [facility] [place] [address]   [phone]            Josh 15, 2017 11:20 AM   NEW TO YOU with Kavitha Mccall M.D.   [facility]    [address] [facility] [place] [address] [phone]            Jun 26, 2017 11:40 AM   Telemedicine Clinic Established Pt with A Rotation, [facility], [facility]   Centralized Scheduling (--)    [address]  [facility] [address]   [phone]              Follow-up Information     1. Schedule an appointment as soon as possible for a visit with Pcp Pt States None.    Specialty:  Family Medicine         Discharge Medication Instructions:    Below are the medications your physician expects you to take upon discharge:    Review all your home medications and newly ordered medications with your doctor and/or pharmacist. Follow medication instructions as directed by your doctor and/or pharmacist.    Please keep your medication list with you and share with your physician.               Medication List      CONTINUE taking these medications        Instructions    Morning Afternoon Evening Bedtime    amlodipine 10 MG Tabs   Commonly known as:  NORVASC        Take 1 Tab by mouth every day.   Dose:  10 mg                        budesonide-formoterol 160-4.5 MCG/ACT Aero   Last time this was given:  2 Puffs on 5/27/2017  9:16 AM   Commonly known as:  SYMBICORT        Inhale 2 Puffs by mouth 2 Times a Day.   Dose:  2 Puff                        celecoxib 200 MG Caps   Commonly known as:   CELEBREX        Take 1 Cap by mouth 2 times a day, with meals.   Dose:  200 mg                        cyclobenzaprine 10 MG Tabs   Last time this was given:  5 mg on 5/26/2017  8:59 PM   Commonly known as:  FLEXERIL        TAKE 1 TABLET BY MOUTH THREE TIMES DAILY AS NEEDED FOR MILD PAIN                        duloxetine 60 MG Cpep delayed-release capsule   Last time this was given:  60 mg on 5/27/2017  9:14 AM   Commonly known as:  CYMBALTA        Take 1 Cap by mouth every day.   Dose:  60 mg                        escitalopram 10 MG Tabs   Last time this was given:  10 mg on 5/27/2017  9:11 AM   Commonly known as:  LEXAPRO        Take 1 Tab by mouth every day.   Dose:  10 mg                        ferrous gluconate 324 (38 FE) MG Tabs   Last time this was given:  324 mg on 5/27/2017  9:14 AM   Commonly known as:  FERGON        Take 1 Tab by mouth 2 times a day, with meals.   Dose:  324 mg                        gabapentin 600 MG tablet   Commonly known as:  NEURONTIN        Take 1 Tab by mouth 3 times a day.   Dose:  600 mg                        insulin glargine 100 UNIT/ML Soln   Last time this was given:  20 Units on 5/26/2017 10:16 PM   Commonly known as:  LANTUS        Inject 20 Units as instructed every evening.   Dose:  20 Units                        insulin lispro 100 UNIT/ML Soln   Last time this was given:  1 Units on 5/27/2017  1:10 PM   Commonly known as:  HUMALOG        Inject 2-9 Units as instructed 4 Times a Day,Before Meals and at Bedtime.   Dose:  2-9 Units                        levothyroxine 75 MCG Tabs   Last time this was given:  75 mcg on 5/27/2017  9:16 AM   Commonly known as:  SYNTHROID        TAKE 1 TABLET BY MOUTH DAILY                        montelukast 10 MG Tabs   Last time this was given:  10 mg on 5/27/2017  9:15 AM   Commonly known as:  SINGULAIR        Take 1 Tab by mouth every day.   Dose:  10 mg                        omeprazole 20 MG delayed-release capsule   Last time this was  given:  20 mg on 5/27/2017  9:11 AM   Commonly known as:  PRILOSEC        Take 1 Cap by mouth every day.   Dose:  20 mg                        oxyCODONE CR 20 MG T12a   Commonly known as:  OXYCONTIN        Take 20 mg by mouth every four hours as needed.   Dose:  20 mg                        scopolamine 1.5 MG/3DAYS Pt72   Commonly known as:  TRANSDERM-SCOP        Apply 1 Patch to skin as directed every 72 hours as needed (Nausea/Vomiting if ondansetron, dexamethasone, diphenhydramine, and/or haloperidol ineffective or not ordered).   Dose:  1 Patch                        tiotropium 18 MCG Caps   Last time this was given:  1 Cap on 5/27/2017  9:17 AM   Commonly known as:  SPIRIVA        Inhale 1 Cap by mouth every day.   Dose:  18 mcg                        trazodone 150 MG Tabs   Last time this was given:  300 mg on 5/26/2017 10:20 PM   Commonly known as:  DESYREL        Take 300 mg by mouth every evening.   Dose:  300 mg                                Instructions           Diet / Nutrition:    Follow any diet instructions given to you by your doctor or the dietician, including how much salt (sodium) you are allowed each day.    If you are overweight, talk to your doctor about a weight reduction plan.    Activity:    Remain physically active following your doctor's instructions about exercise and activity.    Rest often.     Any time you become even a little tired or short of breath, SIT DOWN and rest.    Worsening Symptoms:    Report any of the following signs and symptoms to the doctor's office immediately:    *Pain of jaw, arm, or neck  *Chest pain not relieved by medication                               *Dizziness or loss of consciousness  *Difficulty breathing even when at rest   *More tired than usual                                       *Bleeding drainage or swelling of surgical site  *Swelling of feet, ankles, legs or stomach                 *Fever (>100ºF)  *Pink or blood tinged sputum  *Weight gain  (3lbs/day or 5lbs /week)           *Shock from internal defibrillator (if applicable)  *Palpitations or irregular heartbeats                *Cool and/or numb extremities    Stroke Awareness    Common Risk Factors for Stroke include:    Age  Atrial Fibrillation  Carotid Artery Stenosis  Diabetes Mellitus  Excessive alcohol consumption  High blood pressure  Overweight   Physical inactivity  Smoking    Warning signs and symptoms of a stroke include:    *Sudden numbness or weakness of the face, arm or leg (especially on one side of the body).  *Sudden confusion, trouble speaking or understanding.  *Sudden trouble seeing in one or both eyes.  *Sudden trouble walking, dizziness, loss of balance or coordination.Sudden severe headache with no known cause.    It is very important to get treatment quickly when a stroke occurs. If you experience any of the above warning signs, call 911 immediately.                   Disclaimer         Quit Smoking / Tobacco Use:    I understand the use of any tobacco products increases my chance of suffering from future heart disease or stroke and could cause other illnesses which may shorten my life. Quitting the use of tobacco products is the single most important thing I can do to improve my health. For further information on smoking / tobacco cessation call a Toll Free Quit Line at 1-390.602.1695 (*[employer]) or 1-759.620.8005 (American Lung Association) or you can access the web based program at www.lungAniika.org.    [place] Tobacco Users Help Line:  (599) 238-2700       Toll Free: 1-621.619.3428  Quit Tobacco Program [facility] (960)562-0561    Crisis Hotline:    [place] Crisis Hotline:  4-860-OXNTNUK or 1-296.405.1572    [place] Crisis Hotline:    1-196.209.5661 or 311-242-2935    Discharge Survey:   Thank you for choosing [facility]. We hope we did everything we could to make your hospital stay a pleasant one. You may be receiving a phone survey  and we would appreciate your time and participation in answering the questions. Your input is very valuable to us in our efforts to improve our service to our patients and their families.        My signature on this form indicates that:    1. I have reviewed and understand the above information.  2. My questions regarding this information have been answered to my satisfaction.  3. I have formulated a plan with my discharge nurse to obtain my prescribed medications for home.                  Disclaimer         __________________________________                     __________       ________                       Patient Signature                                                 Date                    Time

## 2017-05-26 NOTE — PROGRESS NOTES
Direct admit from Dr. Holman at [facility]. Dr. Fung accepting for Anemia. ADT signed and held 05/26/2017 at 1605 and will need to be released upon patient arrival to unit. Patient arriving via ground transport.

## 2017-05-27 VITALS
OXYGEN SATURATION: 92 % | DIASTOLIC BLOOD PRESSURE: 60 MMHG | TEMPERATURE: 97.7 F | BODY MASS INDEX: 39.33 KG/M2 | HEART RATE: 59 BPM | HEIGHT: 64 IN | SYSTOLIC BLOOD PRESSURE: 126 MMHG | RESPIRATION RATE: 17 BRPM | WEIGHT: 230.38 LBS

## 2017-05-27 PROBLEM — S82.409A TIBIA/FIBULA FRACTURE: Chronic | Status: ACTIVE | Noted: 2017-05-07

## 2017-05-27 PROBLEM — E11.9 DIABETES MELLITUS, TYPE II (HCC): Chronic | Status: ACTIVE | Noted: 2017-04-04

## 2017-05-27 PROBLEM — E78.5 HLD (HYPERLIPIDEMIA): Chronic | Status: ACTIVE | Noted: 2017-04-04

## 2017-05-27 PROBLEM — D63.8 ANEMIA OF CHRONIC DISEASE: Chronic | Status: ACTIVE | Noted: 2017-05-27

## 2017-05-27 PROBLEM — S82.209A TIBIA/FIBULA FRACTURE: Chronic | Status: ACTIVE | Noted: 2017-05-07

## 2017-05-27 PROBLEM — D64.9 ANEMIA REQUIRING TRANSFUSIONS: Status: ACTIVE | Noted: 2017-05-27

## 2017-05-27 PROBLEM — D63.8 ANEMIA OF CHRONIC DISEASE: Status: ACTIVE | Noted: 2017-05-27

## 2017-05-27 PROBLEM — N18.2 CKD (CHRONIC KIDNEY DISEASE), STAGE II: Chronic | Status: ACTIVE | Noted: 2017-04-04

## 2017-05-27 LAB
ANION GAP SERPL CALC-SCNC: 5 MMOL/L (ref 0–11.9)
ANISOCYTOSIS BLD QL SMEAR: ABNORMAL
BASOPHILS # BLD AUTO: 0 % (ref 0–1.8)
BASOPHILS # BLD: 0 K/UL (ref 0–0.12)
BUN SERPL-MCNC: 25 MG/DL (ref 8–22)
CALCIUM SERPL-MCNC: 9.3 MG/DL (ref 8.5–10.5)
CHLORIDE SERPL-SCNC: 106 MMOL/L (ref 96–112)
CO2 SERPL-SCNC: 31 MMOL/L (ref 20–33)
CREAT SERPL-MCNC: 1.37 MG/DL (ref 0.5–1.4)
EOSINOPHIL # BLD AUTO: 0.08 K/UL (ref 0–0.51)
EOSINOPHIL NFR BLD: 0.9 % (ref 0–6.9)
ERYTHROCYTE [DISTWIDTH] IN BLOOD BY AUTOMATED COUNT: 57.1 FL (ref 35.9–50)
GFR SERPL CREATININE-BSD FRML MDRD: 38 ML/MIN/1.73 M 2
GLUCOSE BLD-MCNC: 105 MG/DL (ref 65–99)
GLUCOSE BLD-MCNC: 184 MG/DL (ref 65–99)
GLUCOSE SERPL-MCNC: 96 MG/DL (ref 65–99)
HCT VFR BLD AUTO: 26 % (ref 37–47)
HCT VFR BLD AUTO: 28.8 % (ref 37–47)
HGB BLD-MCNC: 7.7 G/DL (ref 12–16)
HGB BLD-MCNC: 8.5 G/DL (ref 12–16)
LYMPHOCYTES # BLD AUTO: 1.18 K/UL (ref 1–4.8)
LYMPHOCYTES NFR BLD: 13.7 % (ref 22–41)
MACROCYTES BLD QL SMEAR: ABNORMAL
MANUAL DIFF BLD: NORMAL
MCH RBC QN AUTO: 28.4 PG (ref 27–33)
MCHC RBC AUTO-ENTMCNC: 29.6 G/DL (ref 33.6–35)
MCV RBC AUTO: 95.9 FL (ref 81.4–97.8)
MICROCYTES BLD QL SMEAR: ABNORMAL
MONOCYTES # BLD AUTO: 0.23 K/UL (ref 0–0.85)
MONOCYTES NFR BLD AUTO: 2.7 % (ref 0–13.4)
MORPHOLOGY BLD-IMP: NORMAL
NEUTROPHILS # BLD AUTO: 7.11 K/UL (ref 2–7.15)
NEUTROPHILS NFR BLD: 82.7 % (ref 44–72)
NRBC # BLD AUTO: 0.04 K/UL
NRBC BLD AUTO-RTO: 0.5 /100 WBC
PLATELET # BLD AUTO: 235 K/UL (ref 164–446)
PLATELET BLD QL SMEAR: NORMAL
PMV BLD AUTO: 9 FL (ref 9–12.9)
POLYCHROMASIA BLD QL SMEAR: NORMAL
POTASSIUM SERPL-SCNC: 4.8 MMOL/L (ref 3.6–5.5)
RBC # BLD AUTO: 2.71 M/UL (ref 4.2–5.4)
RBC BLD AUTO: PRESENT
SODIUM SERPL-SCNC: 142 MMOL/L (ref 135–145)
WBC # BLD AUTO: 8.6 K/UL (ref 4.8–10.8)

## 2017-05-27 PROCEDURE — G8987 SELF CARE CURRENT STATUS: HCPCS | Mod: CK

## 2017-05-27 PROCEDURE — 99217 PR OBSERVATION CARE DISCHARGE: CPT | Performed by: INTERNAL MEDICINE

## 2017-05-27 PROCEDURE — 85027 COMPLETE CBC AUTOMATED: CPT

## 2017-05-27 PROCEDURE — 85014 HEMATOCRIT: CPT

## 2017-05-27 PROCEDURE — G0378 HOSPITAL OBSERVATION PER HR: HCPCS

## 2017-05-27 PROCEDURE — A9270 NON-COVERED ITEM OR SERVICE: HCPCS | Performed by: INTERNAL MEDICINE

## 2017-05-27 PROCEDURE — 96372 THER/PROPH/DIAG INJ SC/IM: CPT

## 2017-05-27 PROCEDURE — G8988 SELF CARE GOAL STATUS: HCPCS | Mod: CJ

## 2017-05-27 PROCEDURE — 80048 BASIC METABOLIC PNL TOTAL CA: CPT

## 2017-05-27 PROCEDURE — 97161 PT EVAL LOW COMPLEX 20 MIN: CPT

## 2017-05-27 PROCEDURE — 85018 HEMOGLOBIN: CPT

## 2017-05-27 PROCEDURE — 82962 GLUCOSE BLOOD TEST: CPT

## 2017-05-27 PROCEDURE — G8979 MOBILITY GOAL STATUS: HCPCS | Mod: CI

## 2017-05-27 PROCEDURE — 36415 COLL VENOUS BLD VENIPUNCTURE: CPT

## 2017-05-27 PROCEDURE — 97165 OT EVAL LOW COMPLEX 30 MIN: CPT | Mod: XE

## 2017-05-27 PROCEDURE — G8978 MOBILITY CURRENT STATUS: HCPCS | Mod: CL

## 2017-05-27 PROCEDURE — 85007 BL SMEAR W/DIFF WBC COUNT: CPT

## 2017-05-27 PROCEDURE — 700102 HCHG RX REV CODE 250 W/ 637 OVERRIDE(OP): Performed by: INTERNAL MEDICINE

## 2017-05-27 RX ADMIN — BUDESONIDE AND FORMOTEROL FUMARATE DIHYDRATE 2 PUFF: 160; 4.5 AEROSOL RESPIRATORY (INHALATION) at 09:16

## 2017-05-27 RX ADMIN — DULOXETINE HYDROCHLORIDE 60 MG: 60 CAPSULE, DELAYED RELEASE ORAL at 09:14

## 2017-05-27 RX ADMIN — ESCITALOPRAM OXALATE 10 MG: 10 TABLET ORAL at 09:11

## 2017-05-27 RX ADMIN — HYDROCORTISONE 20 MG: 20 TABLET ORAL at 09:15

## 2017-05-27 RX ADMIN — THERA TABS 1 TABLET: TAB at 09:11

## 2017-05-27 RX ADMIN — MONTELUKAST SODIUM 10 MG: 10 TABLET, FILM COATED ORAL at 09:15

## 2017-05-27 RX ADMIN — INSULIN LISPRO 1 UNITS: 100 INJECTION, SOLUTION INTRAVENOUS; SUBCUTANEOUS at 13:10

## 2017-05-27 RX ADMIN — GABAPENTIN 600 MG: 300 CAPSULE ORAL at 09:11

## 2017-05-27 RX ADMIN — CARVEDILOL 3.12 MG: 3.12 TABLET, FILM COATED ORAL at 09:12

## 2017-05-27 RX ADMIN — TIOTROPIUM BROMIDE 1 CAPSULE: 18 CAPSULE ORAL; RESPIRATORY (INHALATION) at 09:17

## 2017-05-27 RX ADMIN — LEVOTHYROXINE SODIUM 75 MCG: 75 TABLET ORAL at 09:16

## 2017-05-27 RX ADMIN — FERROUS GLUCONATE 324 MG: 324 TABLET ORAL at 09:14

## 2017-05-27 RX ADMIN — OMEPRAZOLE 20 MG: 20 CAPSULE, DELAYED RELEASE ORAL at 09:11

## 2017-05-27 ASSESSMENT — PAIN SCALES - WONG BAKER
WONGBAKER_NUMERICALRESPONSE: DOESN'T HURT AT ALL
WONGBAKER_NUMERICALRESPONSE: DOESN'T HURT AT ALL

## 2017-05-27 ASSESSMENT — PAIN SCALES - GENERAL
PAINLEVEL_OUTOF10: 0

## 2017-05-27 ASSESSMENT — ACTIVITIES OF DAILY LIVING (ADL): TOILETING: INDEPENDENT

## 2017-05-27 ASSESSMENT — COGNITIVE AND FUNCTIONAL STATUS - GENERAL
STANDING UP FROM CHAIR USING ARMS: A LITTLE
TURNING FROM BACK TO SIDE WHILE IN FLAT BAD: A LOT
CLIMB 3 TO 5 STEPS WITH RAILING: TOTAL
MOVING TO AND FROM BED TO CHAIR: A LOT
MOVING FROM LYING ON BACK TO SITTING ON SIDE OF FLAT BED: UNABLE
WALKING IN HOSPITAL ROOM: A LOT
MOBILITY SCORE: 11
SUGGESTED CMS G CODE MODIFIER MOBILITY: CL

## 2017-05-27 ASSESSMENT — GAIT ASSESSMENTS
ASSISTIVE DEVICE: FRONT WHEEL WALKER
GAIT LEVEL OF ASSIST: CONTACT GUARD ASSIST
DISTANCE (FEET): 5

## 2017-05-27 NOTE — DISCHARGE PLANNING
Transfer packet prepared with [employer]. Internal transport form filled out and sent to support services for [facility], await ETA.

## 2017-05-27 NOTE — DISCHARGE SUMMARY
CHIEF COMPLAINT ON ADMISSION  No chief complaint on file.      CODE STATUS  Full Code    HPI & HOSPITAL COURSE  This is a 66 y.o. year old female here with acute anemia with Hgb 6.5 requiring one unit of PRBC, recheck is 8.5 today. She has anemia of chronic disease and has no evidence of acute bleeding and occult stool was negative. Etiology is unknown for her drop in Hgb. We are recommending a follow up with both GI and Hematology to further investigate.    Therefore, she is discharged in fair and stable condition for further post-acute management.     SPECIFIC OUTPATIENT FOLLOW-UP  Hematology  GI  PCP    DISCHARGE PROBLEM LIST  Principal Problem:    Anemia requiring transfusions POA: Yes  Active Problems:    Hypertensive heart disease with heart failure (CMS-HCC) (Chronic) POA: Yes    Tibia/fibula fracture (Chronic) POA: Yes    DM type 2, uncontrolled, with renal complications (CMS-HCC) (Chronic) POA: Yes    COPD (chronic obstructive pulmonary disease) (CMS-HCC) (Chronic) POA: Yes      Overview: On oxygen 3L nocturnal    Morbid obesity (CMS-HCC) (Chronic) POA: Yes    Major depressive disorder (CMS-HCC) (Chronic) POA: Yes    Hypothyroidism (Chronic) POA: Yes    EDITH (obstructive sleep apnea) (Chronic) POA: Yes    Diabetes mellitus, type II (CMS-HCC) (Chronic) POA: Yes    CKD (chronic kidney disease), stage II (Chronic) POA: Yes    HLD (hyperlipidemia) (Chronic) POA: Yes    Vitamin D deficiency (Chronic) POA: Yes    Chronic pain syndrome (Chronic) POA: Yes    Anemia of chronic disease (Chronic) POA: Yes  Resolved Problems:    * No resolved hospital problems. *      FOLLOW UP  Future Appointments  Date Time Provider Department Center   5/31/2017 10:30 AM TELEMEDICINE [facility]   6/15/2017 11:20 AM Kavitha Mccall M.D. [facility]   6/26/2017 11:40 AM [facility] None     Pcp Pt States None    Schedule an appointment as soon as possible for a visit        MEDICATIONS ON  DISCHARGE   Priya Callahan   Home Medication Instructions JAY:[id number]    Printed on:05/27/17 5109   Medication Information                      amlodipine (NORVASC) 10 MG Tab  Take 1 Tab by mouth every day.             budesonide-formoterol (SYMBICORT) 160-4.5 MCG/ACT Aerosol  Inhale 2 Puffs by mouth 2 Times a Day.             celecoxib (CELEBREX) 200 MG Cap  Take 1 Cap by mouth 2 times a day, with meals.             cyclobenzaprine (FLEXERIL) 10 MG Tab  TAKE 1 TABLET BY MOUTH THREE TIMES DAILY AS NEEDED FOR MILD PAIN             duloxetine (CYMBALTA) 60 MG Cap DR Particles delayed-release capsule  Take 1 Cap by mouth every day.             escitalopram (LEXAPRO) 10 MG Tab  Take 1 Tab by mouth every day.             ferrous gluconate (FERGON) 324 (38 FE) MG Tab  Take 1 Tab by mouth 2 times a day, with meals.             gabapentin (NEURONTIN) 600 MG tablet  Take 1 Tab by mouth 3 times a day.             insulin glargine (LANTUS) 100 UNIT/ML Solution  Inject 20 Units as instructed every evening.             insulin lispro (HUMALOG) 100 UNIT/ML Solution  Inject 2-9 Units as instructed 4 Times a Day,Before Meals and at Bedtime.             levothyroxine (SYNTHROID) 75 MCG Tab  TAKE 1 TABLET BY MOUTH DAILY             montelukast (SINGULAIR) 10 MG Tab  Take 1 Tab by mouth every day.             omeprazole (PRILOSEC) 20 MG delayed-release capsule  Take 1 Cap by mouth every day.             oxyCODONE CR (OXYCONTIN) 20 MG Tablet Extended Release 12 hour Abuse-Deterrent  Take 20 mg by mouth every four hours as needed.             scopolamine (TRANSDERM-SCOP) 1.5 MG/3DAYS PATCH 72 HR  Apply 1 Patch to skin as directed every 72 hours as needed (Nausea/Vomiting if ondansetron, dexamethasone, diphenhydramine, and/or haloperidol ineffective or not ordered).             tiotropium (SPIRIVA) 18 MCG Cap  Inhale 1 Cap by mouth every day.             trazodone (DESYREL) 150 MG Tab  Take 300 mg by mouth every evening.                  DIET  Orders Placed This Encounter   Procedures   • Diet Order     Standing Status: Standing      Number of Occurrences: 1      Standing Expiration Date:      Order Specific Question:  Diet:     Answer:  Cardiac [6]     Order Specific Question:  Diet:     Answer:  Diabetic [3]       ACTIVITY  As tolerated and directed by skilled nursing.  Class 2 - comfortable at rest but have symptoms with ordinary physcial activity.    LINES, DRAINS, AND WOUNDS  This is an automated list. Peripheral IVs will be removed prior to discharge.  PIV Group Left Wrist 22g Saline Lock (Active)   Line Secured Taped;Transparent 5/27/2017  8:00 AM   Site Condition / Description Assessed;Patent 5/27/2017  8:00 AM   Dressing Type / Description Transparent 5/27/2017  8:00 AM   Dressing Status Observed 5/27/2017  8:00 AM   Saline Locked Yes 5/27/2017  8:00 AM   Infiltration Grading Used by [facility] 0 5/27/2017  8:00 AM   Phlebitis Scale (Used by [facility]) 0 5/27/2017  8:00 AM       Surgical Incision  Incision Left Ankle (Active)                  MENTAL STATUS ON TRANSFER      A&O x4       CONSULTATIONS  None     PROCEDURES  None    LABORATORY  Lab Results   Component Value Date/Time    SODIUM 142 05/27/2017 04:18 AM    POTASSIUM 4.8 05/27/2017 04:18 AM    CHLORIDE 106 05/27/2017 04:18 AM    CO2 31 05/27/2017 04:18 AM    GLUCOSE 96 05/27/2017 04:18 AM    BUN 25* 05/27/2017 04:18 AM    CREATININE 1.37 05/27/2017 04:18 AM        Lab Results   Component Value Date/Time    WBC 8.6 05/27/2017 04:18 AM    HEMOGLOBIN 8.5* 05/27/2017 10:45 AM    HEMATOCRIT 28.8* 05/27/2017 10:45 AM    PLATELET COUNT 235 05/27/2017 04:18 AM        Total time of the discharge process exceeds 36 minutes.

## 2017-05-27 NOTE — DISCHARGE INSTRUCTIONS
Discharge Instructions    Discharged to home by car with escort. Discharged via wheelchair, hospital escort: Yes.  Special equipment needed: Not Applicable    Be sure to schedule a follow-up appointment with your primary care doctor or any specialists as instructed.     Discharge Plan:   Diet Plan: Discussed  Activity Level: Discussed  Confirmed Follow up Appointment: Patient to Call and Schedule Appointment  Confirmed Symptoms Management: Discussed  Medication Reconciliation Updated: Yes  Influenza Vaccine Indication: Not indicated: Previously immunized this influenza season and > 8 years of age    I understand that a diet low in cholesterol, fat, and sodium is recommended for good health. Unless I have been given specific instructions below for another diet, I accept this instruction as my diet prescription.   Other diet:     Special Instructions: None    · Is patient discharged on Warfarin / Coumadin?   No     · Is patient Post Blood Transfusion?  No    Depression / Suicide Risk    As you are discharged from this [facility] facility, it is important to learn how to keep safe from harming yourself.    Recognize the warning signs:  · Abrupt changes in personality, positive or negative- including increase in energy   · Giving away possessions  · Change in eating patterns- significant weight changes-  positive or negative  · Change in sleeping patterns- unable to sleep or sleeping all the time   · Unwillingness or inability to communicate  · Depression  · Unusual sadness, discouragement and loneliness  · Talk of wanting to die  · Neglect of personal appearance   · Rebelliousness- reckless behavior  · Withdrawal from people/activities they love  · Confusion- inability to concentrate     If you or a loved one observes any of these behaviors or has concerns about self-harm, here's what you can do:  · Talk about it- your feelings and reasons for harming yourself  · Remove any means that you might use to hurt yourself  (examples: pills, rope, extension cords, firearm)  · Get professional help from the community (Mental Health, Substance Abuse, psychological counseling)  · Do not be alone:Call your Safe Contact- someone whom you trust who will be there for you.  · Call your local CRISIS HOTLINE 787-4186 or 805-964-5673  · Call your local Children's Mobile Crisis Response Team [place] (448) 756-9246 or www.[employer]  · Call the toll free National Suicide Prevention Hotlines   · National Suicide Prevention Lifeline 768-104-INOE (9818)  · [employer] [facility] Line Network 800-SUICIDE (545-9463)    Anemia, Nonspecific  Anemia is a condition in which the concentration of red blood cells or hemoglobin in the blood is below normal. Hemoglobin is a substance in red blood cells that carries oxygen to the tissues of the body. Anemia results in not enough oxygen reaching these tissues.   CAUSES   Common causes of anemia include:   · Excessive bleeding. Bleeding may be internal or external. This includes excessive bleeding from periods (in women) or from the intestine.    · Poor nutrition.    · Chronic kidney, thyroid, and liver disease.   · Bone marrow disorders that decrease red blood cell production.  · Cancer and treatments for cancer.  · HIV, AIDS, and their treatments.  · Spleen problems that increase red blood cell destruction.  · Blood disorders.  · Excess destruction of red blood cells due to infection, medicines, and autoimmune disorders.  SIGNS AND SYMPTOMS   · Minor weakness.    · Dizziness.    · Headache.  · Palpitations.    · Shortness of breath, especially with exercise.    · Paleness.  · Cold sensitivity.  · Indigestion.  · Nausea.  · Difficulty sleeping.  · Difficulty concentrating.  Symptoms may occur suddenly or they may develop slowly.   DIAGNOSIS   Additional blood tests are often needed. These help your health care provider determine the best treatment. Your health care provider will check your stool for blood and  look for other causes of blood loss.   TREATMENT   Treatment varies depending on the cause of the anemia. Treatment can include:   · Supplements of iron, vitamin B12, or folic acid.    · Hormone medicines.    · A blood transfusion. This may be needed if blood loss is severe.    · Hospitalization. This may be needed if there is significant continual blood loss.    · Dietary changes.  · Spleen removal.  HOME CARE INSTRUCTIONS  Keep all follow-up appointments. It often takes many weeks to correct anemia, and having your health care provider check on your condition and your response to treatment is very important.  SEEK IMMEDIATE MEDICAL CARE IF:   · You develop extreme weakness, shortness of breath, or chest pain.    · You become dizzy or have trouble concentrating.  · You develop heavy vaginal bleeding.    · You develop a rash.    · You have bloody or black, tarry stools.    · You faint.    · You vomit up blood.    · You vomit repeatedly.    · You have abdominal pain.  · You have a fever or persistent symptoms for more than 2-3 days.    · You have a fever and your symptoms suddenly get worse.    · You are dehydrated.    MAKE SURE YOU:  · Understand these instructions.  · Will watch your condition.  · Will get help right away if you are not doing well or get worse.     This information is not intended to replace advice given to you by your health care provider. Make sure you discuss any questions you have with your health care provider.     Document Released: 01/25/2006 Document Revised: 08/20/2014 Document Reviewed: 06/13/2014  [employer] Interactive Patient Education ©2016 [employer]

## 2017-05-27 NOTE — PROGRESS NOTES
Received report from evening RN.  Pt is A/Ox4.  Respirations are even and unlabored on RA.  Pt states some improvement from yesterday, not feeling as weak.  PT/OT at bedside working with patient.  POC reviewed, verbalized understanding.  Will continue to closely monitor. Call light within reach.

## 2017-05-27 NOTE — DISCHARGE PLANNING
Anticipate discharge today. Sabine with [facility] ([id number]) notified. Pt will return to room G-11.    Report to be called to Lucinda [phone]. Await DC order.

## 2017-05-27 NOTE — H&P
DATE OF ADMISSION:  05/26/2017    CHIEF COMPLAINT:  Transferred from [facility] for low blood level.    HISTORY OF PRESENT ILLNESS:  The patient is a 66-year-old female with   complicated medical problems.  She has had this hospital from May 7th to   05/10/2017 where she had a left tib-fib fracture that was treated with an   ORIF.  She went to [facility] on May 10th.  She has been in rehabilitation   there.  She was doing well until about the last week, she has had increasing   fatigue.  She was noted to have serial H and H done at [facility] and has   slowly downtrended to a lucina of 6.5 and 22.5 this morning.  No obvious blood   loss.  A fecal occult blood was apparently negative there.  Patient does have   a history of hemorrhoids, but no hemorrhoidal bleeding.  No hemorrhoidal   fissure, not been noted on rectal exam at [facility].  Patient says that   no pain with bowel movements.  She says she was not straining at all.  She   endorses mild acid reflux.  Her voice is hoarse and normal.  She does take   Celebrex, but no new blood thinners, no other NSAIDs.  She denies any   abdominal pain.  No nausea, vomiting, or diarrhea.  She does feel mildly   dizzy.  She otherwise feels quite weak.  She had colonoscopy 6 years ago, but   is apparently clean, now is on for age-related purposes.  Additionally, she   has never had an EGD done.  She has had low blood level in the past, but she   has never been told why.    REVIEW OF SYSTEMS:  All other systems reviewed and negative.    PAST MEDICAL HISTORY:  1.  COPD with chronic respiratory failure, 4 liters of oxygen at all times.  2.  CKD, stage III.  3.  Diabetes mellitus, insulin-dependent.  4.  Diabetic neuropathy.  5.  History of AFib.  6.  Hypertension.  7.  Anemia of chronic disease.  8.  Recent tib-fib fracture of the left tib-fib.  9.  EDITH, not on CPAP.  10.  Iron deficiency anemia.  11.  COPD.  12.  Diastolic heart failure.    PAST SURGICAL HISTORY:   Recent ORIF of the left tib-fib.    SOCIAL HISTORY:  Quit smoking in February this year.  No alcohol, no drugs.    FAMILY HISTORY:  None.    ALLERGIES:  VITAMIN C, KEFLEX, CODEINE, LYRICA, AND SUDAFED.    MEDICATIONS PRIOR TO ADMISSION:  1.  Tylenol 650 mg tabs every 6 hours as needed for mild pain or fever.  2.  Combivent 2 puffs every 6 hours as needed for shortness of breath.  3.  Amlodipine 10 mg tabs every day.  4.  Symbicort 2 puffs b.i.d.  5.  Coreg 3.125 mg b.i.d. with meals.  6.  Celebrex 200 mg tablets b.i.d. with meals.  7.  Flexeril 10 mg tabs 3 times a day as needed for mild pain.  8.  Docusate 100 mg tabs b.i.d.  9.  Cymbalta 60 mg tablets every day.  10.  Lexapro 10 mg tabs every day.  11.  Ferrous gluconate 324 mg tabs b.i.d. with meals.  12.  Gabapentin 600 mg tablets t.i.d.  13.  Cortef 20 mg tabs every day.  14.  Lantus 20 units every evening.  15.  Insulin sliding scale.  16.  Levothyroxine 75 mcg tablets every day.  17.  Montelukast 10 mg tablets every day.  18.  Multivitamin tablet daily.  19.  Omeprazole 20 mg tabs every day.  20.  MiraLax packet b.i.d. as needed for constipation.  21.  Scopolamine patch.  22.  Senna and docusate 8.6/50 mg tabs 1 tablet every day.  23.  Simvastatin 10 mg tabs every day.  24.  Tiotropium 18 mcg capsule every day.  25.  Trazodone 100 mg tablets at bedtime as needed for insomnia.    PHYSICAL EXAMINATION:  VITAL SIGNS:  Temperature 36.8, heart rate 67, respiratory rate 18, oxygen   saturation 90% on 4 L nasal cannula, and blood pressure is 105/55.  GENERAL:  Patient is in no acute distress.  She is somewhat pale appearing,   pleasant, cooperative, A and O x1.  HEENT:  Normocephalic, atraumatic, somewhat erythematous oropharynx.  Dry   mucous membranes.  PULMONARY:  Clear to auscultation bilaterally.  No use of accessory muscles.  ABDOMEN:  Soft, nontender, and nondistended.  Normoactive bowel sounds.  No   hepatosplenomegaly.  EXTREMITIES:  No clubbing, cyanosis,  or edema.  She has a left brace in place   with Kerlix in place.  She has good capillary refill throughout.  NEUROLOGIC:  Intact sensation to soft touch throughout except for decreased   sensation from the mid ankle distal.  MUSCULOSKELETAL:  Full range of motion except for limited range of motion of   the left plantar flexion and dorsiflexion.  SKIN:  Pallor, but no rashes, lesions, or excoriations.  PSYCHOLOGICAL:  Appropriate affect, A and O x4.    LABORATORY DATA AND IMAGING:  H and H 6.5 and 22.5.  CMP from 3 days ago shows   a BUN and creatinine 29 and 1.35, which is actually better than her baseline.    ASSESSMENT:  1.  Acute on chronic anemia, unknown etiology.  2.  Chronic kidney disease, stage III.  3.  Diabetes mellitus type 2, insulin-dependent.  4.  Chronic obstructive pulmonary disease.  5.  Chronic respiratory failure with hypoxia.  6.  Recent tibia and fibula fracture.  7.  Atrial fibrillation.  8.  Hyperlipidemia.    PLAN:  The patient will be admitted to the CDU.  At this time, she is going to   require 1 unit of packed red blood cells.  Her vital signs are stable.  I am   unsure exactly where she is bleeding from.  She does have a colonoscopy   apparently 6 years ago that was clean.  She is on Celebrex, which will hold.    This is likely a slow bleed, no obvious bleeding from the surgical site.  This   could be an element of anemia of chronic disease, possible GI bleed with   fecal occult blood test was negative for what it is worth.  No obvious   hemorrhoidal bleeding.  I am going to do 1 unit of packed red blood cells.  If   she is stabilized, she can probably go back to a skilled nursing facility.    She might need benefit from an eventual outpatient EGD and colonoscopy.  I am   going to check an EPO level as well as vitamin B12, folate, ferritin, iron,   and total iron-binding capacity.    CODE STATUS:  Full code, full care.    DIET:  Cardiac, diabetic.    DVT prophylaxis, bilateral SCDs in  place.       ____________________________________     MD MARCI CASTILLO / DMITRY    DD:  05/26/2017 20:21:48  DT:  05/26/2017 22:02:30    D#:  [id number]  Job#:  [id number]

## 2017-05-27 NOTE — THERAPY
"Occupational Therapy Evaluation completed.   Functional Status:  Pt performing ADLs with min/mod a, limited oob mobility due to increased level of fatigue with attempts of hopping with FWW, able to maintain her TTWB precautions during t/f's and mobility, no c/o HA, dizziness while eob and standing. Pt would benefit from acute skilled services while in house and would need to return back to SNf for continuing rehab prior to d/c home.   Plan of Care: Will benefit from Occupational Therapy 2 times per week  Discharge Recommendations:  Equipment: Will Continue to Assess for Equipment Needs. Post-acute therapy Discharge to a transitional care facility for continued skilled therapy services.    See \"Rehab Therapy-Acute\" Patient Summary Report for complete documentation.    "

## 2017-05-27 NOTE — THERAPY
"Pt is 66 y.o. female presenting from rehab w/ anemia of unknown etiology. Transfused 1 unit PRBC. Pt recently d/c'd 5/10 after ORIF of L tib/fib fracture. Pt is currently TTWB L LE w/ CAM boot. Pt wears 4L/min O2 chronically maintain SpO2 in low 90s/high 80s. For OOB activity pt requiring Giovanni to maintain wt bearing status. Distance mobilized limited secondary to R knee pain. At this time cont to recommend pt return to skilled facility for further rehabilitation.     Physical Therapy Evaluation completed.   Bed Mobility:  Supine to Sit: Stand by Assist  Transfers: Sit to Stand: Contact Guard Assist  Gait: Level Of Assist: Contact Guard Assist with Front-Wheel Walker       Plan of Care: Will benefit from Physical Therapy 3 times per week  Discharge Recommendations: Equipment: Will Continue to Assess for Equipment Needs. Post-acute therapy Discharge to a transitional care facility for continued skilled therapy services.    Agnieszka Campos PT, [phone]    See \"Rehab Therapy-Acute\" Patient Summary Report for complete documentation.     "

## 2017-05-27 NOTE — PROGRESS NOTES
Pt changed into gown, repositioned in bed for comfort.  A & O, VSS; updated on POC - waiting for orders from Dr. Fung.  Report to SHITAL Wagner.

## 2017-05-27 NOTE — PROGRESS NOTES
1730  Report from [facility] RN, Lucinda.  Pt to transfer to 12 via ambulance at approx 1800.    1840  Pt arrived via [facility]; ER Admitting called for brant; Dr. Kenton brandon - he will be up to see Pt at 1930.

## 2017-05-27 NOTE — PROGRESS NOTES
Attempted to call report.  Message left with this RN phone number.  Transport here to take patient to skilled.

## 2017-05-28 LAB — EPO SERPL-ACNC: 103 MU/ML (ref 4–27)

## 2017-05-29 LAB
HCT VFR BLD AUTO: 25.4 % (ref 37–47)
HGB BLD-MCNC: 7.6 G/DL (ref 12–16)

## 2017-05-30 LAB
APPEARANCE UR: CLEAR
BACTERIA #/AREA URNS HPF: ABNORMAL /HPF
BILIRUB UR QL STRIP.AUTO: NEGATIVE
COLOR UR: ABNORMAL
CULTURE IF INDICATED INDCX: YES UA CULTURE
EPI CELLS #/AREA URNS HPF: ABNORMAL /HPF
GLUCOSE UR STRIP.AUTO-MCNC: NEGATIVE MG/DL
KETONES UR STRIP.AUTO-MCNC: NEGATIVE MG/DL
LEUKOCYTE ESTERASE UR QL STRIP.AUTO: NEGATIVE
MICRO URNS: ABNORMAL
NITRITE UR QL STRIP.AUTO: NEGATIVE
PH UR STRIP.AUTO: 6.5 [PH]
PROT UR QL STRIP: 30 MG/DL
RBC # URNS HPF: ABNORMAL /HPF
RBC UR QL AUTO: NEGATIVE
SP GR UR STRIP.AUTO: 1
WBC #/AREA URNS HPF: ABNORMAL /HPF
YEAST BUDDING URNS QL: PRESENT /HPF

## 2017-05-31 LAB
ANION GAP SERPL CALC-SCNC: 8 MMOL/L (ref 0–11.9)
BASOPHILS # BLD AUTO: 0.5 % (ref 0–1.8)
BASOPHILS # BLD: 0.04 K/UL (ref 0–0.12)
BUN SERPL-MCNC: 31 MG/DL (ref 8–22)
CALCIUM SERPL-MCNC: 8.9 MG/DL (ref 8.5–10.5)
CHLORIDE SERPL-SCNC: 105 MMOL/L (ref 96–112)
CO2 SERPL-SCNC: 29 MMOL/L (ref 20–33)
CREAT SERPL-MCNC: 1.3 MG/DL (ref 0.5–1.4)
EOSINOPHIL # BLD AUTO: 0.24 K/UL (ref 0–0.51)
EOSINOPHIL NFR BLD: 2.8 % (ref 0–6.9)
ERYTHROCYTE [DISTWIDTH] IN BLOOD BY AUTOMATED COUNT: 60.5 FL (ref 35.9–50)
GFR SERPL CREATININE-BSD FRML MDRD: 41 ML/MIN/1.73 M 2
GLUCOSE SERPL-MCNC: 106 MG/DL (ref 65–99)
HCT VFR BLD AUTO: 25.9 % (ref 37–47)
HGB BLD-MCNC: 7.8 G/DL (ref 12–16)
IMM GRANULOCYTES # BLD AUTO: 0.1 K/UL (ref 0–0.11)
IMM GRANULOCYTES NFR BLD AUTO: 1.2 % (ref 0–0.9)
LYMPHOCYTES # BLD AUTO: 1.52 K/UL (ref 1–4.8)
LYMPHOCYTES NFR BLD: 18 % (ref 22–41)
MCH RBC QN AUTO: 28.7 PG (ref 27–33)
MCHC RBC AUTO-ENTMCNC: 30.1 G/DL (ref 33.6–35)
MCV RBC AUTO: 95.2 FL (ref 81.4–97.8)
MONOCYTES # BLD AUTO: 0.53 K/UL (ref 0–0.85)
MONOCYTES NFR BLD AUTO: 6.3 % (ref 0–13.4)
NEUTROPHILS # BLD AUTO: 6.03 K/UL (ref 2–7.15)
NEUTROPHILS NFR BLD: 71.2 % (ref 44–72)
NRBC # BLD AUTO: 0 K/UL
NRBC BLD AUTO-RTO: 0 /100 WBC
PLATELET # BLD AUTO: 231 K/UL (ref 164–446)
PMV BLD AUTO: 9.5 FL (ref 9–12.9)
POTASSIUM SERPL-SCNC: 4.6 MMOL/L (ref 3.6–5.5)
RBC # BLD AUTO: 2.72 M/UL (ref 4.2–5.4)
SODIUM SERPL-SCNC: 142 MMOL/L (ref 135–145)
TSH SERPL DL<=0.005 MIU/L-ACNC: 3.42 UIU/ML (ref 0.3–3.7)
WBC # BLD AUTO: 8.5 K/UL (ref 4.8–10.8)

## 2017-06-01 ENCOUNTER — HOSPITAL ENCOUNTER (OUTPATIENT)
Dept: LAB | Facility: MEDICAL CENTER | Age: 67
End: 2017-06-01
Attending: INTERNAL MEDICINE
Payer: MEDICARE

## 2017-06-01 LAB
BACTERIA UR CULT: NORMAL
SIGNIFICANT IND 70042: NORMAL
SOURCE SOURCE: NORMAL

## 2017-06-02 ENCOUNTER — TELEPHONE (OUTPATIENT)
Dept: HEMATOLOGY ONCOLOGY | Facility: MEDICAL CENTER | Age: 67
End: 2017-06-02

## 2017-06-02 ENCOUNTER — APPOINTMENT (OUTPATIENT)
Dept: RADIOLOGY | Facility: MEDICAL CENTER | Age: 67
DRG: 871 | End: 2017-06-02
Attending: HOSPITALIST
Payer: MEDICARE

## 2017-06-02 ENCOUNTER — RESOLUTE PROFESSIONAL BILLING HOSPITAL PROF FEE (OUTPATIENT)
Dept: HOSPITALIST | Facility: MEDICAL CENTER | Age: 67
End: 2017-06-02
Payer: MEDICARE

## 2017-06-02 ENCOUNTER — APPOINTMENT (OUTPATIENT)
Dept: RADIOLOGY | Facility: MEDICAL CENTER | Age: 67
DRG: 871 | End: 2017-06-02
Attending: EMERGENCY MEDICINE
Payer: MEDICARE

## 2017-06-02 ENCOUNTER — HOSPITAL ENCOUNTER (INPATIENT)
Facility: MEDICAL CENTER | Age: 67
LOS: 7 days | DRG: 871 | End: 2017-06-09
Attending: EMERGENCY MEDICINE | Admitting: HOSPITALIST
Payer: MEDICARE

## 2017-06-02 DIAGNOSIS — A41.9 SEPTIC SHOCK(785.52): ICD-10-CM

## 2017-06-02 DIAGNOSIS — R65.21 SEPTIC SHOCK(785.52): ICD-10-CM

## 2017-06-02 LAB
ABO GROUP BLD: NORMAL
ALBUMIN SERPL BCP-MCNC: 2.8 G/DL (ref 3.2–4.9)
ALBUMIN/GLOB SERPL: 1.1 G/DL
ALP SERPL-CCNC: 68 U/L (ref 30–99)
ALT SERPL-CCNC: 19 U/L (ref 2–50)
AMORPH CRY #/AREA URNS HPF: PRESENT /HPF
ANION GAP SERPL CALC-SCNC: 9 MMOL/L (ref 0–11.9)
APPEARANCE UR: CLEAR
APTT PPP: 31.9 SEC (ref 24.7–36)
AST SERPL-CCNC: 32 U/L (ref 12–45)
BACTERIA #/AREA URNS HPF: ABNORMAL /HPF
BASE EXCESS BLDA CALC-SCNC: 1 MMOL/L (ref -4–3)
BASOPHILS # BLD AUTO: 0.3 % (ref 0–1.8)
BASOPHILS # BLD: 0.04 K/UL (ref 0–0.12)
BILIRUB SERPL-MCNC: 0.6 MG/DL (ref 0.1–1.5)
BILIRUB UR QL STRIP.AUTO: NEGATIVE
BLD GP AB SCN SERPL QL: NORMAL
BODY TEMPERATURE: ABNORMAL DEGREES
BUN SERPL-MCNC: 30 MG/DL (ref 8–22)
CALCIUM SERPL-MCNC: 8.4 MG/DL (ref 8.5–10.5)
CHLORIDE SERPL-SCNC: 104 MMOL/L (ref 96–112)
CO2 BLDA-SCNC: 29 MMOL/L (ref 20–33)
CO2 SERPL-SCNC: 26 MMOL/L (ref 20–33)
COLOR UR: YELLOW
CORTIS SERPL-MCNC: 3.7 UG/DL (ref 0–23)
CORTIS SERPL-MCNC: 7.2 UG/DL (ref 0–23)
CREAT SERPL-MCNC: 1.45 MG/DL (ref 0.5–1.4)
EKG IMPRESSION: NORMAL
EKG IMPRESSION: NORMAL
EOSINOPHIL # BLD AUTO: 0.18 K/UL (ref 0–0.51)
EOSINOPHIL NFR BLD: 1.4 % (ref 0–6.9)
ERYTHROCYTE [DISTWIDTH] IN BLOOD BY AUTOMATED COUNT: 59.8 FL (ref 35.9–50)
GFR SERPL CREATININE-BSD FRML MDRD: 36 ML/MIN/1.73 M 2
GLOBULIN SER CALC-MCNC: 2.6 G/DL (ref 1.9–3.5)
GLUCOSE BLD-MCNC: 136 MG/DL (ref 65–99)
GLUCOSE BLD-MCNC: 246 MG/DL (ref 65–99)
GLUCOSE SERPL-MCNC: 146 MG/DL (ref 65–99)
GLUCOSE UR STRIP.AUTO-MCNC: NEGATIVE MG/DL
GRAM STN SPEC: NORMAL
HCO3 BLDA-SCNC: 27.5 MMOL/L (ref 17–25)
HCT VFR BLD AUTO: 26.3 % (ref 37–47)
HGB BLD-MCNC: 7.9 G/DL (ref 12–16)
HYALINE CASTS #/AREA URNS LPF: ABNORMAL /LPF
IMM GRANULOCYTES # BLD AUTO: 0.1 K/UL (ref 0–0.11)
IMM GRANULOCYTES NFR BLD AUTO: 0.8 % (ref 0–0.9)
INR PPP: 1.05 (ref 0.87–1.13)
KETONES UR STRIP.AUTO-MCNC: NEGATIVE MG/DL
LACTATE BLD-SCNC: 1.3 MMOL/L (ref 0.5–2)
LACTATE BLD-SCNC: 1.5 MMOL/L (ref 0.5–2)
LACTATE BLD-SCNC: 1.6 MMOL/L (ref 0.5–2)
LACTATE BLD-SCNC: 2 MMOL/L (ref 0.5–2)
LEUKOCYTE ESTERASE UR QL STRIP.AUTO: NEGATIVE
LYMPHOCYTES # BLD AUTO: 0.82 K/UL (ref 1–4.8)
LYMPHOCYTES NFR BLD: 6.2 % (ref 22–41)
MAGNESIUM SERPL-MCNC: 1.6 MG/DL (ref 1.5–2.5)
MCH RBC QN AUTO: 28.8 PG (ref 27–33)
MCHC RBC AUTO-ENTMCNC: 30 G/DL (ref 33.6–35)
MCV RBC AUTO: 96 FL (ref 81.4–97.8)
MICRO URNS: ABNORMAL
MONOCYTES # BLD AUTO: 0.99 K/UL (ref 0–0.85)
MONOCYTES NFR BLD AUTO: 7.4 % (ref 0–13.4)
MUCOUS THREADS #/AREA URNS HPF: ABNORMAL /HPF
NEUTROPHILS # BLD AUTO: 11.19 K/UL (ref 2–7.15)
NEUTROPHILS NFR BLD: 83.9 % (ref 44–72)
NITRITE UR QL STRIP.AUTO: NEGATIVE
NRBC # BLD AUTO: 0 K/UL
NRBC BLD AUTO-RTO: 0 /100 WBC
O2/TOTAL GAS SETTING VFR VENT: 80 %
PCO2 BLDA: 55.7 MMHG (ref 26–37)
PCO2 TEMP ADJ BLDA: 68.3 MMHG (ref 26–37)
PH BLDA: 7.3 [PH] (ref 7.4–7.5)
PH TEMP ADJ BLDA: 7.24 [PH] (ref 7.4–7.5)
PH UR STRIP.AUTO: 5.5 [PH]
PLATELET # BLD AUTO: 243 K/UL (ref 164–446)
PMV BLD AUTO: 9.1 FL (ref 9–12.9)
PO2 BLDA: 136 MMHG (ref 64–87)
PO2 TEMP ADJ BLDA: 168 MMHG (ref 64–87)
POTASSIUM SERPL-SCNC: 4.6 MMOL/L (ref 3.6–5.5)
PROCALCITONIN SERPL-MCNC: 0.39 NG/ML
PROCALCITONIN SERPL-MCNC: 1.93 NG/ML
PROT SERPL-MCNC: 5.4 G/DL (ref 6–8.2)
PROT UR QL STRIP: 70 MG/DL
PROTHROMBIN TIME: 14 SEC (ref 12–14.6)
RBC # BLD AUTO: 2.74 M/UL (ref 4.2–5.4)
RBC UR QL AUTO: NEGATIVE
RH BLD: NORMAL
SAO2 % BLDA: 99 % (ref 93–99)
SIGNIFICANT IND 70042: NORMAL
SITE SITE: NORMAL
SODIUM SERPL-SCNC: 139 MMOL/L (ref 135–145)
SOURCE SOURCE: NORMAL
SP GR UR STRIP.AUTO: 1.01
SPECIMEN DRAWN FROM PATIENT: ABNORMAL
TROPONIN I SERPL-MCNC: 0.02 NG/ML (ref 0–0.04)
TROPONIN I SERPL-MCNC: 0.02 NG/ML (ref 0–0.04)
TROPONIN I SERPL-MCNC: 0.03 NG/ML (ref 0–0.04)
TROPONIN I SERPL-MCNC: 0.04 NG/ML (ref 0–0.04)
TSH SERPL DL<=0.005 MIU/L-ACNC: 1.22 UIU/ML (ref 0.3–3.7)
WBC # BLD AUTO: 13.3 K/UL (ref 4.8–10.8)
WBC #/AREA URNS HPF: ABNORMAL /HPF

## 2017-06-02 PROCEDURE — 31500 INSERT EMERGENCY AIRWAY: CPT

## 2017-06-02 PROCEDURE — C1751 CATH, INF, PER/CENT/MIDLINE: HCPCS | Performed by: EMERGENCY MEDICINE

## 2017-06-02 PROCEDURE — 84484 ASSAY OF TROPONIN QUANT: CPT

## 2017-06-02 PROCEDURE — 302154 K THERMIA BLANKET 24X60: Performed by: HOSPITALIST

## 2017-06-02 PROCEDURE — 99291 CRITICAL CARE FIRST HOUR: CPT | Mod: AI | Performed by: HOSPITALIST

## 2017-06-02 PROCEDURE — 82533 TOTAL CORTISOL: CPT

## 2017-06-02 PROCEDURE — 700111 HCHG RX REV CODE 636 W/ 250 OVERRIDE (IP): Performed by: EMERGENCY MEDICINE

## 2017-06-02 PROCEDURE — 302132 K THERMIA MOTOR: Performed by: HOSPITALIST

## 2017-06-02 PROCEDURE — 82803 BLOOD GASES ANY COMBINATION: CPT

## 2017-06-02 PROCEDURE — 85025 COMPLETE CBC W/AUTO DIFF WBC: CPT

## 2017-06-02 PROCEDURE — 36415 COLL VENOUS BLD VENIPUNCTURE: CPT

## 2017-06-02 PROCEDURE — 94002 VENT MGMT INPAT INIT DAY: CPT

## 2017-06-02 PROCEDURE — 82962 GLUCOSE BLOOD TEST: CPT

## 2017-06-02 PROCEDURE — 700105 HCHG RX REV CODE 258: Performed by: EMERGENCY MEDICINE

## 2017-06-02 PROCEDURE — 700102 HCHG RX REV CODE 250 W/ 637 OVERRIDE(OP): Performed by: INTERNAL MEDICINE

## 2017-06-02 PROCEDURE — A9270 NON-COVERED ITEM OR SERVICE: HCPCS | Performed by: INTERNAL MEDICINE

## 2017-06-02 PROCEDURE — 99291 CRITICAL CARE FIRST HOUR: CPT | Performed by: INTERNAL MEDICINE

## 2017-06-02 PROCEDURE — 700111 HCHG RX REV CODE 636 W/ 250 OVERRIDE (IP): Performed by: INTERNAL MEDICINE

## 2017-06-02 PROCEDURE — A9270 NON-COVERED ITEM OR SERVICE: HCPCS | Performed by: HOSPITALIST

## 2017-06-02 PROCEDURE — 83605 ASSAY OF LACTIC ACID: CPT

## 2017-06-02 PROCEDURE — 85730 THROMBOPLASTIN TIME PARTIAL: CPT

## 2017-06-02 PROCEDURE — 700105 HCHG RX REV CODE 258: Performed by: INTERNAL MEDICINE

## 2017-06-02 PROCEDURE — 93005 ELECTROCARDIOGRAM TRACING: CPT | Performed by: EMERGENCY MEDICINE

## 2017-06-02 PROCEDURE — 0BH18EZ INSERTION OF ENDOTRACHEAL AIRWAY INTO TRACHEA, VIA NATURAL OR ARTIFICIAL OPENING ENDOSCOPIC: ICD-10-PCS | Performed by: EMERGENCY MEDICINE

## 2017-06-02 PROCEDURE — 36600 WITHDRAWAL OF ARTERIAL BLOOD: CPT

## 2017-06-02 PROCEDURE — 96368 THER/DIAG CONCURRENT INF: CPT

## 2017-06-02 PROCEDURE — 80053 COMPREHEN METABOLIC PANEL: CPT

## 2017-06-02 PROCEDURE — 700111 HCHG RX REV CODE 636 W/ 250 OVERRIDE (IP)

## 2017-06-02 PROCEDURE — 770022 HCHG ROOM/CARE - ICU (200)

## 2017-06-02 PROCEDURE — 96375 TX/PRO/DX INJ NEW DRUG ADDON: CPT

## 2017-06-02 PROCEDURE — 700105 HCHG RX REV CODE 258: Performed by: HOSPITALIST

## 2017-06-02 PROCEDURE — 96366 THER/PROPH/DIAG IV INF ADDON: CPT

## 2017-06-02 PROCEDURE — 71010 DX-CHEST-PORTABLE (1 VIEW): CPT

## 2017-06-02 PROCEDURE — 87070 CULTURE OTHR SPECIMN AEROBIC: CPT

## 2017-06-02 PROCEDURE — 700105 HCHG RX REV CODE 258

## 2017-06-02 PROCEDURE — 87086 URINE CULTURE/COLONY COUNT: CPT

## 2017-06-02 PROCEDURE — 96367 TX/PROPH/DG ADDL SEQ IV INF: CPT

## 2017-06-02 PROCEDURE — 87040 BLOOD CULTURE FOR BACTERIA: CPT

## 2017-06-02 PROCEDURE — C1751 CATH, INF, PER/CENT/MIDLINE: HCPCS

## 2017-06-02 PROCEDURE — 81001 URINALYSIS AUTO W/SCOPE: CPT

## 2017-06-02 PROCEDURE — 86850 RBC ANTIBODY SCREEN: CPT

## 2017-06-02 PROCEDURE — 51702 INSERT TEMP BLADDER CATH: CPT

## 2017-06-02 PROCEDURE — 96365 THER/PROPH/DIAG IV INF INIT: CPT

## 2017-06-02 PROCEDURE — 02HV33Z INSERTION OF INFUSION DEVICE INTO SUPERIOR VENA CAVA, PERCUTANEOUS APPROACH: ICD-10-PCS | Performed by: EMERGENCY MEDICINE

## 2017-06-02 PROCEDURE — 87205 SMEAR GRAM STAIN: CPT

## 2017-06-02 PROCEDURE — 85610 PROTHROMBIN TIME: CPT

## 2017-06-02 PROCEDURE — 86901 BLOOD TYPING SEROLOGIC RH(D): CPT

## 2017-06-02 PROCEDURE — 99291 CRITICAL CARE FIRST HOUR: CPT

## 2017-06-02 PROCEDURE — A6250 SKIN SEAL PROTECT MOISTURIZR: HCPCS | Performed by: HOSPITALIST

## 2017-06-02 PROCEDURE — 700101 HCHG RX REV CODE 250: Performed by: INTERNAL MEDICINE

## 2017-06-02 PROCEDURE — 86900 BLOOD TYPING SEROLOGIC ABO: CPT

## 2017-06-02 PROCEDURE — 700101 HCHG RX REV CODE 250: Performed by: HOSPITALIST

## 2017-06-02 PROCEDURE — 36556 INSERT NON-TUNNEL CV CATH: CPT

## 2017-06-02 PROCEDURE — 84443 ASSAY THYROID STIM HORMONE: CPT

## 2017-06-02 PROCEDURE — 84145 PROCALCITONIN (PCT): CPT

## 2017-06-02 PROCEDURE — 700102 HCHG RX REV CODE 250 W/ 637 OVERRIDE(OP): Performed by: HOSPITALIST

## 2017-06-02 PROCEDURE — 303105 HCHG CATHETER EXTRA

## 2017-06-02 PROCEDURE — 83735 ASSAY OF MAGNESIUM: CPT

## 2017-06-02 PROCEDURE — 82962 GLUCOSE BLOOD TEST: CPT | Mod: 91

## 2017-06-02 PROCEDURE — 5A1945Z RESPIRATORY VENTILATION, 24-96 CONSECUTIVE HOURS: ICD-10-PCS | Performed by: EMERGENCY MEDICINE

## 2017-06-02 PROCEDURE — 302214 INTUBATION BOX: Performed by: EMERGENCY MEDICINE

## 2017-06-02 RX ORDER — CARVEDILOL 3.12 MG/1
3.12 TABLET ORAL 2 TIMES DAILY WITH MEALS
COMMUNITY
End: 2017-07-06 | Stop reason: SDUPTHER

## 2017-06-02 RX ORDER — LIDOCAINE HYDROCHLORIDE 10 MG/ML
1-2 INJECTION, SOLUTION INFILTRATION; PERINEURAL
Status: DISCONTINUED | OUTPATIENT
Start: 2017-06-02 | End: 2017-06-03

## 2017-06-02 RX ORDER — CHLORHEXIDINE GLUCONATE ORAL RINSE 1.2 MG/ML
15 SOLUTION DENTAL 2 TIMES DAILY
Status: DISCONTINUED | OUTPATIENT
Start: 2017-06-02 | End: 2017-06-05

## 2017-06-02 RX ORDER — GABAPENTIN 300 MG/1
300 CAPSULE ORAL 2 TIMES DAILY
Status: DISCONTINUED | OUTPATIENT
Start: 2017-06-02 | End: 2017-06-09 | Stop reason: HOSPADM

## 2017-06-02 RX ORDER — SUCCINYLCHOLINE CHLORIDE 20 MG/ML
100 INJECTION INTRAMUSCULAR; INTRAVENOUS ONCE
Status: COMPLETED | OUTPATIENT
Start: 2017-06-02 | End: 2017-06-02

## 2017-06-02 RX ORDER — NOREPINEPHRINE BITARTRATE 1 MG/ML
INJECTION, SOLUTION INTRAVENOUS
Status: DISPENSED
Start: 2017-06-02 | End: 2017-06-02

## 2017-06-02 RX ORDER — POLYETHYLENE GLYCOL 3350 17 G/17G
1 POWDER, FOR SOLUTION ORAL
Status: DISCONTINUED | OUTPATIENT
Start: 2017-06-02 | End: 2017-06-09 | Stop reason: HOSPADM

## 2017-06-02 RX ORDER — ACETAMINOPHEN 325 MG/1
650 TABLET ORAL EVERY 4 HOURS PRN
Status: DISCONTINUED | OUTPATIENT
Start: 2017-06-02 | End: 2017-06-02

## 2017-06-02 RX ORDER — MIDAZOLAM HYDROCHLORIDE 1 MG/ML
1-5 INJECTION INTRAMUSCULAR; INTRAVENOUS
Status: DISCONTINUED | OUTPATIENT
Start: 2017-06-02 | End: 2017-06-02

## 2017-06-02 RX ORDER — CHOLECALCIFEROL (VITAMIN D3) 125 MCG
2000 CAPSULE ORAL DAILY
Status: ON HOLD | COMMUNITY
End: 2017-11-12

## 2017-06-02 RX ORDER — OXYCODONE HYDROCHLORIDE 10 MG/1
10 TABLET ORAL
COMMUNITY
End: 2017-08-23 | Stop reason: SDUPTHER

## 2017-06-02 RX ORDER — ONDANSETRON 2 MG/ML
4 INJECTION INTRAMUSCULAR; INTRAVENOUS EVERY 4 HOURS PRN
Status: DISCONTINUED | OUTPATIENT
Start: 2017-06-02 | End: 2017-06-09 | Stop reason: HOSPADM

## 2017-06-02 RX ORDER — SODIUM CHLORIDE 9 MG/ML
500 INJECTION, SOLUTION INTRAVENOUS
Status: DISCONTINUED | OUTPATIENT
Start: 2017-06-02 | End: 2017-06-04

## 2017-06-02 RX ORDER — ACETAMINOPHEN 325 MG/1
650 TABLET ORAL EVERY 6 HOURS PRN
Status: DISCONTINUED | OUTPATIENT
Start: 2017-06-02 | End: 2017-06-09 | Stop reason: HOSPADM

## 2017-06-02 RX ORDER — SODIUM CHLORIDE 9 MG/ML
500 INJECTION, SOLUTION INTRAVENOUS
Status: DISCONTINUED | OUTPATIENT
Start: 2017-06-02 | End: 2017-06-02

## 2017-06-02 RX ORDER — OXYCODONE HYDROCHLORIDE 10 MG/1
10 TABLET ORAL
Status: DISCONTINUED | OUTPATIENT
Start: 2017-06-02 | End: 2017-06-09 | Stop reason: HOSPADM

## 2017-06-02 RX ORDER — CYCLOBENZAPRINE HCL 10 MG
10 TABLET ORAL 3 TIMES DAILY PRN
Status: ON HOLD | COMMUNITY
End: 2017-06-22

## 2017-06-02 RX ORDER — SODIUM CHLORIDE 9 MG/ML
30 INJECTION, SOLUTION INTRAVENOUS ONCE
Status: COMPLETED | OUTPATIENT
Start: 2017-06-02 | End: 2017-06-02

## 2017-06-02 RX ORDER — LEVOTHYROXINE SODIUM 88 UG/1
88 TABLET ORAL
Status: DISCONTINUED | OUTPATIENT
Start: 2017-06-03 | End: 2017-06-03

## 2017-06-02 RX ORDER — HEPARIN SODIUM 5000 [USP'U]/ML
5000 INJECTION, SOLUTION INTRAVENOUS; SUBCUTANEOUS EVERY 8 HOURS
Status: DISCONTINUED | OUTPATIENT
Start: 2017-06-02 | End: 2017-06-02

## 2017-06-02 RX ORDER — ONDANSETRON 4 MG/1
4 TABLET, ORALLY DISINTEGRATING ORAL EVERY 4 HOURS PRN
Status: DISCONTINUED | OUTPATIENT
Start: 2017-06-02 | End: 2017-06-09 | Stop reason: HOSPADM

## 2017-06-02 RX ORDER — LANOLIN ALCOHOL/MO/W.PET/CERES
1000 CREAM (GRAM) TOPICAL EVERY EVENING
COMMUNITY
End: 2018-04-10

## 2017-06-02 RX ORDER — INSULIN GLARGINE 100 [IU]/ML
20 INJECTION, SOLUTION SUBCUTANEOUS NIGHTLY
Status: DISCONTINUED | OUTPATIENT
Start: 2017-06-02 | End: 2017-06-09 | Stop reason: HOSPADM

## 2017-06-02 RX ORDER — ESCITALOPRAM OXALATE 10 MG/1
10 TABLET ORAL DAILY
Status: DISCONTINUED | OUTPATIENT
Start: 2017-06-02 | End: 2017-06-02

## 2017-06-02 RX ORDER — SODIUM CHLORIDE 9 MG/ML
30 INJECTION, SOLUTION INTRAVENOUS
Status: DISCONTINUED | OUTPATIENT
Start: 2017-06-02 | End: 2017-06-02

## 2017-06-02 RX ORDER — OXYCODONE HCL 10 MG/1
20 TABLET, FILM COATED, EXTENDED RELEASE ORAL EVERY 4 HOURS PRN
Status: DISCONTINUED | OUTPATIENT
Start: 2017-06-02 | End: 2017-06-02

## 2017-06-02 RX ORDER — ACETAMINOPHEN 650 MG/1
650 SUPPOSITORY RECTAL EVERY 6 HOURS PRN
Status: DISCONTINUED | OUTPATIENT
Start: 2017-06-02 | End: 2017-06-09 | Stop reason: HOSPADM

## 2017-06-02 RX ORDER — TIOTROPIUM BROMIDE 18 UG/1
1 CAPSULE ORAL; RESPIRATORY (INHALATION) DAILY
Status: DISCONTINUED | OUTPATIENT
Start: 2017-06-02 | End: 2017-06-02

## 2017-06-02 RX ORDER — AMOXICILLIN 250 MG
2 CAPSULE ORAL 2 TIMES DAILY
Status: DISCONTINUED | OUTPATIENT
Start: 2017-06-02 | End: 2017-06-07

## 2017-06-02 RX ORDER — HEPARIN SODIUM 5000 [USP'U]/ML
5000 INJECTION, SOLUTION INTRAVENOUS; SUBCUTANEOUS EVERY 8 HOURS
Status: DISCONTINUED | OUTPATIENT
Start: 2017-06-02 | End: 2017-06-08

## 2017-06-02 RX ORDER — HYDROCORTISONE 5 MG/1
5 TABLET ORAL DAILY
Status: ON HOLD | COMMUNITY
End: 2017-07-03

## 2017-06-02 RX ORDER — CYANOCOBALAMIN 1000 UG/ML
1000 INJECTION, SOLUTION INTRAMUSCULAR; SUBCUTANEOUS
Status: ON HOLD | COMMUNITY
End: 2017-06-22

## 2017-06-02 RX ORDER — DEXTROSE MONOHYDRATE 25 G/50ML
25 INJECTION, SOLUTION INTRAVENOUS
Status: DISCONTINUED | OUTPATIENT
Start: 2017-06-02 | End: 2017-06-09 | Stop reason: HOSPADM

## 2017-06-02 RX ORDER — SODIUM CHLORIDE 9 MG/ML
INJECTION, SOLUTION INTRAVENOUS CONTINUOUS
Status: DISCONTINUED | OUTPATIENT
Start: 2017-06-02 | End: 2017-06-04

## 2017-06-02 RX ORDER — DULOXETIN HYDROCHLORIDE 30 MG/1
60 CAPSULE, DELAYED RELEASE ORAL DAILY
Status: DISCONTINUED | OUTPATIENT
Start: 2017-06-02 | End: 2017-06-09 | Stop reason: HOSPADM

## 2017-06-02 RX ORDER — ETOMIDATE 2 MG/ML
20 INJECTION INTRAVENOUS ONCE
Status: COMPLETED | OUTPATIENT
Start: 2017-06-02 | End: 2017-06-02

## 2017-06-02 RX ORDER — UREA 10 %
800 LOTION (ML) TOPICAL DAILY
COMMUNITY
End: 2017-09-28

## 2017-06-02 RX ORDER — AMOXICILLIN 250 MG
1 CAPSULE ORAL EVERY EVENING
Status: ON HOLD | COMMUNITY
End: 2017-11-12

## 2017-06-02 RX ORDER — BISACODYL 10 MG
10 SUPPOSITORY, RECTAL RECTAL
Status: DISCONTINUED | OUTPATIENT
Start: 2017-06-02 | End: 2017-06-09 | Stop reason: HOSPADM

## 2017-06-02 RX ORDER — MONTELUKAST SODIUM 10 MG/1
10 TABLET ORAL EVERY EVENING
Status: DISCONTINUED | OUTPATIENT
Start: 2017-06-02 | End: 2017-06-09 | Stop reason: HOSPADM

## 2017-06-02 RX ORDER — BUDESONIDE AND FORMOTEROL FUMARATE DIHYDRATE 160; 4.5 UG/1; UG/1
2 AEROSOL RESPIRATORY (INHALATION) 2 TIMES DAILY
Status: DISCONTINUED | OUTPATIENT
Start: 2017-06-02 | End: 2017-06-02

## 2017-06-02 RX ORDER — SODIUM CHLORIDE 9 MG/ML
30 INJECTION, SOLUTION INTRAVENOUS
Status: DISCONTINUED | OUTPATIENT
Start: 2017-06-02 | End: 2017-06-04

## 2017-06-02 RX ORDER — FLUCONAZOLE 100 MG/1
100 TABLET ORAL DAILY
Status: ON HOLD | COMMUNITY
Start: 2017-05-31 | End: 2017-06-09

## 2017-06-02 RX ORDER — DOCUSATE SODIUM 100 MG/1
100 CAPSULE, LIQUID FILLED ORAL 2 TIMES DAILY
COMMUNITY
End: 2017-09-28

## 2017-06-02 RX ADMIN — NOREPINEPHRINE BITARTRATE 2 MCG/MIN: 1 INJECTION INTRAVENOUS at 09:30

## 2017-06-02 RX ADMIN — HEPARIN SODIUM 5000 UNITS: 5000 INJECTION, SOLUTION INTRAVENOUS; SUBCUTANEOUS at 21:13

## 2017-06-02 RX ADMIN — MIDAZOLAM HYDROCHLORIDE 5 MG: 1 INJECTION, SOLUTION INTRAMUSCULAR; INTRAVENOUS at 07:05

## 2017-06-02 RX ADMIN — PROPOFOL 5 MCG/KG/MIN: 10 INJECTION, EMULSION INTRAVENOUS at 06:58

## 2017-06-02 RX ADMIN — PIPERACILLIN SODIUM AND TAZOBACTAM SODIUM 4.5 G: 4; .5 INJECTION, POWDER, FOR SOLUTION INTRAVENOUS at 12:56

## 2017-06-02 RX ADMIN — FENTANYL CITRATE 25 MCG: 50 INJECTION, SOLUTION INTRAMUSCULAR; INTRAVENOUS at 20:34

## 2017-06-02 RX ADMIN — SUCCINYLCHOLINE CHLORIDE 100 MG: 20 INJECTION, SOLUTION INTRAMUSCULAR; INTRAVENOUS at 06:50

## 2017-06-02 RX ADMIN — FENTANYL CITRATE 100 MCG: 50 INJECTION, SOLUTION INTRAMUSCULAR; INTRAVENOUS at 07:05

## 2017-06-02 RX ADMIN — ETOMIDATE 20 MG: 2 INJECTION INTRAVENOUS at 06:50

## 2017-06-02 RX ADMIN — PIPERACILLIN SODIUM AND TAZOBACTAM SODIUM 4.5 G: 4; .5 INJECTION, POWDER, FOR SOLUTION INTRAVENOUS at 23:46

## 2017-06-02 RX ADMIN — PIPERACILLIN SODIUM AND TAZOBACTAM SODIUM 4.5 G: 4; .5 INJECTION, POWDER, FOR SOLUTION INTRAVENOUS at 17:31

## 2017-06-02 RX ADMIN — INSULIN LISPRO 3 UNITS: 100 INJECTION, SOLUTION INTRAVENOUS; SUBCUTANEOUS at 23:45

## 2017-06-02 RX ADMIN — NOREPINEPHRINE BITARTRATE 20 MCG/MIN: 1 INJECTION INTRAVENOUS at 17:51

## 2017-06-02 RX ADMIN — ACETAMINOPHEN 650 MG: 650 SUPPOSITORY RECTAL at 12:55

## 2017-06-02 RX ADMIN — SENNOSIDES AND DOCUSATE SODIUM 2 TABLET: 8.6; 5 TABLET ORAL at 21:12

## 2017-06-02 RX ADMIN — MONTELUKAST SODIUM 10 MG: 10 TABLET, FILM COATED ORAL at 21:12

## 2017-06-02 RX ADMIN — HYDROCORTISONE SODIUM SUCCINATE 100 MG: 100 INJECTION, POWDER, FOR SOLUTION INTRAMUSCULAR; INTRAVENOUS at 12:55

## 2017-06-02 RX ADMIN — PIPERACILLIN SODIUM AND TAZOBACTAM SODIUM 4.5 G: 4; .5 INJECTION, POWDER, FOR SOLUTION INTRAVENOUS at 07:15

## 2017-06-02 RX ADMIN — VANCOMYCIN HYDROCHLORIDE 2600 MG: 100 INJECTION, POWDER, LYOPHILIZED, FOR SOLUTION INTRAVENOUS at 12:04

## 2017-06-02 RX ADMIN — CHLORHEXIDINE GLUCONATE 15 ML: 1.2 RINSE ORAL at 12:56

## 2017-06-02 RX ADMIN — SODIUM CHLORIDE: 9 INJECTION, SOLUTION INTRAVENOUS at 11:30

## 2017-06-02 RX ADMIN — GABAPENTIN 300 MG: 300 CAPSULE ORAL at 21:12

## 2017-06-02 RX ADMIN — CHLORHEXIDINE GLUCONATE 15 ML: 1.2 RINSE ORAL at 21:13

## 2017-06-02 RX ADMIN — INSULIN GLARGINE 20 UNITS: 100 INJECTION, SOLUTION SUBCUTANEOUS at 21:13

## 2017-06-02 RX ADMIN — HYDROCORTISONE SODIUM SUCCINATE 100 MG: 100 INJECTION, POWDER, FOR SOLUTION INTRAMUSCULAR; INTRAVENOUS at 21:13

## 2017-06-02 RX ADMIN — HEPARIN SODIUM 5000 UNITS: 5000 INJECTION, SOLUTION INTRAVENOUS; SUBCUTANEOUS at 12:56

## 2017-06-02 RX ADMIN — SODIUM CHLORIDE 3135 ML: 9 INJECTION, SOLUTION INTRAVENOUS at 07:15

## 2017-06-02 RX ADMIN — INSULIN LISPRO 2 UNITS: 100 INJECTION, SOLUTION INTRAVENOUS; SUBCUTANEOUS at 17:31

## 2017-06-02 ASSESSMENT — LIFESTYLE VARIABLES: DO YOU DRINK ALCOHOL: NO

## 2017-06-02 NOTE — ED NOTES
Pt BIBA to rm 4 from [facility] where she is recovering from tibia fx with     Chief Complaint   Patient presents with   • Shortness of Breath   • Fever     Per report, pt RA O2 SAT at facility 74% with respiratory rate of 40 and heavy accessory muscle use; unable to speak in complete sentences.   On scene pt was immediately placed on CPAP and given duoneb/albuterol tx with improved SAT of 91-94%. Breath sounds diminished at all bases. Pt with hx of COPD and recent admissions; denies home O2.  Pt intubated immediately on arrival to ER by ERP. Medications documented appropriately in MAR.     Rehab facility noted pt to be febrile today at 102F; upon arrival 107F by temporal scan.   Temp sykes placed current temp with 103.3F.   Pt is being treated at rehab facility for UTI; incontinent with soiled brief on arrival.     Central line initiated following intubation. Blood drawn and sent to lab.   3.125L NS bolus started. Propofol infusing.     Report given to Agatha ISAACS.

## 2017-06-02 NOTE — PROGRESS NOTES
"Pharmacy Kinetics 66 y.o. female on vancomycin day # 1  2017    Vancomycin New Start   : Vanco load 2600 mg IV at 1204    Indication for Treatment: Empiric - HCAP    Pertinent history per medical record: Admitted on 2017 for sepsis d/t suspected pneumonia. Patient transferred from [facility] for recovery from a L ankle fracture. Patient currently being treated with fluconazole for funguria, noted to have fever and hypoxia at [facility] and transferred to [facility] for further care. Patient required intubation in ED, imaging revealed bilateral pneumonia, thus empiric antibiotics initiated for possible HCAP.    Other antibiotics: piperacillin/tazobactam 4.5 gm IV q6hrs    Allergies: Vitamin c; Keflex; Codeine; Lyrica; and Sudafed     Concerns for renal function include age, DM, CKD with BUN/SCr ratio > 20:1, CHF, obesity (BMI ~40), albumin 2.8, hypermetabolic state (SIRS score 4) & hypotension requiring vasopressor support.    Pertinent cultures to date:   : sputum cx - to be collected  : peripheral blood cx X2 - in process  : urine cx - in process  : influenza by PCR - in process    Imagin/2: CXR - Right lung and left perihilar pneumonia.    Recent Labs      17   0257  17   0745   WBC  8.5  13.3*   NEUTSPOLYS  71.20  83.90*     Recent Labs      17   0257  17   0745   BUN  31*  30*   CREATININE  1.30  1.45*   ALBUMIN   --   2.8*   LACTIC ACID   --  2.0   PROCALCITONIN   --  0.39*     No results for input(s): VANCOTROUGH, VANCOPEAK, VANCORANDOM in the last 72 hours.    No intake or output data in the 24 hours ending 17 1209     Blood pressure 120/74, pulse 79, temperature 41.7 °C (107 °F), resp. rate 37, height 1.626 m (5' 4\"), weight 104.5 kg (230 lb 6.1 oz), SpO2 89 %. Temp (24hrs), Av.7 °C (107 °F), Min:41.7 °C (107 °F), Max:41.7 °C (107 °F)      A/P   1. Vancomycin dose change: Start vanco 1600 mg IV daily  2. Next vancomycin level: 2 days (not currently " ordered)  3. Goal trough: 16-20 mcg/mL  4. Comments: Empiric vancomycin initiated for treatment of pneumonia, sputum culture pending collection. Patient's lactate wnl, procalcitonin elevated. Reviewed patient's prior vanco dosing history, was recently on q12hr vanco regimen but no vanco levels available for interpretation. Patient currently requiring moderate doses of norepi to maintain blood pressure, also has multiple additional risk factors present for accumulation of vanco with continued dosing. Will start conservative once daily vanco regimen and plan trough prior to steady state to evaluate for possible dose adjustments early in therapy and ensure adequate drug levels are being attained. Will continue to follow cultures and recommend de-escalation of antibiotics if continued MRSA coverage is no longer indicated.    Pharmacy will continue to follow.     Asiya Villavicencio, KhoiD

## 2017-06-02 NOTE — DIETARY
"  Nutrition Support Assessment - Female    Priya Callahan is a 66 y.o. female with admitting DX of Sepsis     Pertinent History: GERD, COPD, HTN, fibromyalgia, neuropathy in bilateral feet, T2 DM, sleep apnea, PTSD, CKD stage III, atrial fibrillation  Allergies: Vitamin c; Keflex; Codeine; Lyrica; and Sudafed    Height: 162.6 cm (5' 4\")  Weight: 104.5 kg (230 lb 6.1 oz)  Weight to Use in Calculations: 104.5 kg (230 lb 6.1 oz)  Ideal Body Weight: 54.432 kg (120 lb)  Percent Ideal Body Weight: 192  Body mass index is 39.53 kg/(m^2)    Pertinent Labs: WBC: 13.3 (H), glucose: 146 (H), BUN: 30 (H), creat: 1.45 (H)  Last BM: (pta)  Pertinent Medications: fentanyl, solu-cortef, lantus, SSI, synthroid, electrolyte replacement protocol, prilosec, zosyn, senokot, vancomycin, levophed @ 20 mcg/min, propofol @ 3.1 ml/hr (82 kcal/day)  Pertinent Fluids: NS infusion @ 125 ml/hr  Surgery / Procedures: none at this time  Skin: no skin breakdown noted     Estimated Needs: REE per MSJ x 1 = 1567 kcal/day (65-70% = 1019 - 1097 kcal/day) OR 11 - 14 kcal/kg = 1150 - 1463 kcal/day  Total Calories / day: 1100 - 1300 (Calories / k - 12)  Total Grams Protein / day: 82 - 109 (Grams Protein / kg of IBW: 1.5 - 2)  Total Fluids ml / day: 1570.3 ml (15 ml/kg)        Assessment / Evaluation:   1) pt was intubated and sedated in ER upon admit - nutrition support indicated, no Cortrak placed at this time  2) hypocaloric feeds appropriate in critically ill obese pt - estimated needs based off of SCCM obesity guidelines   3) no need to adjust TF rate for current propofol infusion - will monitor daily  4) pt on high-dose of pressors    Plan / Recommendation:   1) pt unstable on high-dose pressors, recommend holding tube feeding until stable w/ minimal pressor support  2) when pt is stable and appropriate for feeding, start Peptamen Intense VHP @ 25 ml/hr and advance per protocol to an end goal rate of 50 ml/hr to provide 1200 kcal, 112 " gm of protein (2.1 gm/kg IBW), and 1008 ml of free water per day  3) fluids per MD    RD following

## 2017-06-02 NOTE — IP AVS SNAPSHOT
6/9/2017    Priya Callahan  [address] [place] [address]    Dear Priya:    [facility] wants to ensure your discharge home is safe and you or your loved ones have had all of your questions answered regarding your care after you leave the hospital.    Below is a list of resources and contact information should you have any questions regarding your hospital stay, follow-up instructions, or active medical symptoms.    Questions or Concerns Regarding… Contact   Medical Questions Related to Your Discharge  (7 days a week, 8am-5pm) Contact a Nurse Care Coordinator   [phone]   Medical Questions Not Related to Your Discharge  (24 hours a day / 7 days a week)  Contact the Nurse Health Line   [phone]    Medications or Discharge Instructions Refer to your discharge packet   or contact your [facility] Primary Care Provider   [phone]   Follow-up Appointment(s) Schedule your appointment via [employer]   or contact Scheduling [phone]   Billing Review your statement via [employer]  or contact Billing [phone]   Medical Records Review your records via [employer]   or contact Medical Records [phone]     You may receive a telephone call within two days of discharge. This call is to make certain you understand your discharge instructions and have the opportunity to have any questions answered. You can also easily access your medical information, test results and upcoming appointments via the [employer] free online health management tool. You can learn more and sign up at [email or web address]. For assistance setting up your [employer] account, please call [phone].    Once again, we want to ensure your discharge home is safe and that you have a clear understanding of any next steps in your care. If you have any questions or concerns, please do not hesitate to contact us, we are here for you. Thank you for choosing [facility] for your healthcare needs.    Sincerely,    Your [facility] Healthcare Team

## 2017-06-02 NOTE — CARE PLAN
Problem: Ventilation Defect:  Goal: Ability to achieve and maintain unassisted ventilation or tolerate decreased levels of ventilator support  Intervention: Support and monitor invasive and noninvasive mechanical ventilation  Vent day 1, no wean.

## 2017-06-02 NOTE — ED PROVIDER NOTES
"ED Provider Note    CHIEF COMPLAINT  Chief Complaint   Patient presents with   • Shortness of Breath   • Fever       HPI  Priya Callahan is a 66 y.o. female who presents with shortness of breath and fever. Patient currently at rehab after lower extremity fracture. She also is being treated for a urinary tract infection. Started having increased short of breath by report this morning. Noted to be febrile with a temperature 102 by EMS. Her blood pressure was stable. Patient is a history of COPD area she was placed on CPAP. She had diminished breath sounds and was only able to speak in one-word sentences. She was given albuterol and Atrovent nebulizer treatment. This did not seem to help. The patient is unable to provide any history because of her current critical illness. History obtained by EMS and review of chart    REVIEW OF SYSTEMS  Unable to obtain    PAST MEDICAL HISTORY  Past Medical History   Diagnosis Date   • COPD    • ASTHMA    • Hypertension    • Pneumonia      as a child   • Bronchitis      as a child   • Unspecified urinary incontinence    • Fall    • Infectious disease      Hepatitis C   • Arthritis      \"everywhere\"   • Fibromyalgia    • Neuropathy (CMS-Formerly [facility])      bilat feet   • Other specified symptom associated with female genital organs      Hysterectomy at age 22yrs   • Diabetes      Type 2   • Hepatitis C      \"I have markers in blood but not active\"   • Congestive heart failure (CMS-Formerly [facility]) 2013     related to pulmonary failure   • Indigestion    • GERD (gastroesophageal reflux disease)    • Breath shortness      \"only with flare up with allergies\"   • Sleep apnea      does not wear CPAP, \"can't do it\"   • Disorder of thyroid      Hypothyroid   • Backpain      chronic back pain   • Heart burn    • Pain 9/13/2016     \"pain everywhere\"   • Psychiatric problem 9/13/2016     PTSD   • On home oxygen therapy        SOCIAL HISTORY  Social History   Substance Use Topics   • Smoking status: Former " Smoker -- 1.00 packs/day for 50 years     Types: Cigarettes     Quit date: 02/01/2017   • Smokeless tobacco: Never Used   • Alcohol Use: No       SURGICAL HISTORY  Past Surgical History   Procedure Laterality Date   • Gyn surgery       hysterectomy    • Other orthopedic surgery       L knee replacement    • Other orthopedic surgery       R carpal tunnel    • Us-needle core bx-breast panel     • Lumpectomy Left      Left breast   • Pr inj dx/ther agnt paravert facet joint, bruce* Left 7/10/2015     Procedure: INJ PARA FACET L/S 1 LVL W/IG - L3-S1;  Surgeon: Xavier Arteaga D.O.;  Location: [facility];  Service: Pain Management   • Pr inj dx/ther agnt paravert facet joint, bruce*  7/10/2015     Procedure: INJ PARA FACET L/S 2D LVL W/IG;  Surgeon: Xavier Arteaga D.O.;  Location: [facility];  Service: Pain Management   • Pr inj dx/ther agnt paravert facet joint, bruce*  7/10/2015     Procedure: INJ PARA FACET L/S 3D LVL W/IG;  Surgeon: Xavier Arteaga D.O.;  Location: [facility];  Service: Pain Management   • Pr inject nerv blck,othr periph nerv  7/10/2015     Procedure: INJ-ANES AGENT-OTHER PHER.NRVE;  Surgeon: Xavier Arteaga D.O.;  Location: [facility];  Service: Pain Management   • Pr inj dx/ther agnt paravert facet joint, bruce* Left 7/17/2015     Procedure: INJ PARA FACET L/S 1 LVL W/IG - L3-S1;  Surgeon: Xavier Arteaga D.O.;  Location: [facility];  Service: Pain Management   • Pr inj dx/ther agnt paravert facet joint, bruce*  7/17/2015     Procedure: INJ PARA FACET L/S 2D LVL W/IG;  Surgeon: Xavier Arteaga D.O.;  Location: [facility];  Service: Pain Management   • Pr inj dx/ther agnt paravert facet joint, bruce*  7/17/2015     Procedure: INJ PARA FACET L/S 3D LVL W/IG;  Surgeon: Xavier Arteaga D.O.;  Location: [facility];  Service: Pain Management   • Pr inject nerv maciel shields periph nerv  7/17/2015      Procedure: INJ-ANES AGENT-OTHER PHER.NRVE;  Surgeon: Xavier Arteaga D.O.;  [facility];  Service: Pain Management   • Pr dstr nrolytc agnt parverteb fct sngl lmbr/sacral Left 10/2/2015     Procedure: NEURO DEST FACET L/S W/IG SNGL - L3-S1;  Surgeon: Xavier Arteaga D.O.;  [facility]: [facility];  Service: Pain Management   • Pr dstr nrolytc agnt parverteb fct addl lmbr/sacral  10/2/2015     Procedure: NEURO DEST FACET L/S W/IG ADDL;  Surgeon: Xavier Arteaga D.O.;  [facility];  Service: Pain Management   • Pr inject rx other periph nerve  10/2/2015     Procedure: NEUROLYTIC DEST-OTHER NERVE;  Surgeon: Xavier Arteaga D.O.;  [facility];  Service: Pain Management   • Pr dstr nrolytc agnt parverteb fct addl lmbr/sacral  10/2/2015     Procedure: NEURO DEST FACET L/S W/IG ADDL;  Surgeon: Xavier Arteaga D.O.;  Location: [facility];  Service: Pain Management   • Pr dstr nrolytc agnt parverteb fct sngl lmbr/sacral Right 11/6/2015     Procedure: NEURO DEST FACET L/S W/IG SNGL - L3-S1;  Surgeon: Xavier Arteaga D.O.;  Location: [facility];  Service: Pain Management   • Pr dstr nrolytc agnt parverteb fct addl lmbr/sacral  11/6/2015     Procedure: NEURO DEST FACET L/S W/IG ADDL;  Surgeon: Xavier Arteaga D.O.;  [facility];  Service: Pain Management   • Pr inject rx other periph nerve  11/6/2015     Procedure: NEUROLYTIC DEST-OTHER NERVE;  Surgeon: Xavier Arteaga D.O.;  [facility];  Service: Pain Management   • Pr dstr nrolytc agnt parverteb fct addl lmbr/sacral  11/6/2015     Procedure: NEURO DEST FACET L/S W/IG ADDL;  Surgeon: Xavier Arteaga D.O.;  [facility];  Service: Pain Management   • Pr dstr nrolytc agnt parverteb fct sngl lmbr/sacral Left 9/16/2016     Procedure: NEURO DEST FACET L/S W/IG SNGL -  "L3-S1;  Surgeon: Xavier Arteaga D.O.;  Location: [facility];  Service: Pain Management   • Pr dstr nrolytc agnt parverteb fct addl lmbr/sacral  9/16/2016     Procedure: NEURO DEST FACET L/S W/IG ADDL;  Surgeon: Xavier Arteaga D.O.;  Location: [facility];  Service: Pain Management   • Pr dstr nrolytc agnt parverteb fct addl lmbr/sacral  9/16/2016     Procedure: NEURO DEST FACET L/S W/IG ADDL;  Surgeon: Xavier Arteaga D.O.;  Location: [facility];  Service: Pain Management   • Pr fluoroscopic guidance needle placement  9/16/2016     Procedure: FLOURO GUIDE NEEDLE PLACEMENT;  Surgeon: Xavier Arteaga D.O.;  Location: [facility];  Service: Pain Management   • Ankle orif Left 5/8/2017     Procedure: ANKLE ORIF;  Surgeon: Rico Gtz M.D.;  Location: [facility];  Service:        CURRENT MEDICATIONS  Home Medications     **Home medications have not yet been reviewed for this encounter**          ALLERGIES  Allergies   Allergen Reactions   • Vitamin C Diarrhea     \"violent diarrhea\"   • Keflex Rash     Severe rash, has tolerated Augmentin and Zosyn (as of April 2017)   • Codeine Nausea     Severe stomach pain   • Lyrica Nausea     Nausea, dizzy   • Sudafed Nausea and Unspecified     Chest pain, nausea       PHYSICAL EXAM  VITAL SIGNS: /74 mmHg  Pulse 75  Temp(Src) 41.7 °C (107 °F)  Resp 29  Ht 1.626 m (5' 4\")  Wt 104.5 kg (230 lb 6.1 oz)  BMI 39.53 kg/m2  SpO2 93%   Constitutional: Awake. Obvious respiratory distress. Does not answer questions  HENT:  Atraumatic, Normocephalic.Oropharynx dry mucus membranes, Nose normal inspection.   Eyes: Normal inspection  Neck: Supple  Cardiovascular: Normal heart rate, Normal rhythm.  Symmetric peripheral pulses.   Thorax & Lungs: Tachypnea, diminished breath sounds throughout. Crackles over the right lung fields.  Abdomen: Bowel sounds normal, soft, non-distended, nontender, no " mass  Skin: Feels very warm  Back: No tenderness, No CVA tenderness.   Extremities: Walking boot right lower extremity. This is removed. Surgical wounds clean, dry and intact.  Neurologic: She moves all extremities weakly      RADIOLOGY/PROCEDURES  DX-CHEST-PORTABLE (1 VIEW)   Final Result      1.  Satisfactory endotracheal tube and right IJV line positioning.      2.  Right lung and left perihilar pneumonia.         Imaging is interpreted by radiologist    Intubation Procedure Note    Indication: Respiratory failure    Consent: Unable to be obtained due to the emergent nature of this procedure.    Medications Used: etomidate 20 mg intravenously and succinycholine 100 mg intravenously    Procedure: The patient was placed in the appropriate position.  Cricoid pressure was utilized.  Intubation was performed by direct laryngoscopy using a laryngoscope and a 7.5 cuffed endotracheal tube.  The cuff was then inflated and the tube was secured appropriately at a distance of 22 cm to the dental ridge.  Initial confirmation of placement included bilateral breath sounds, an end tidal CO2 detector, absence of sounds over the stomach, tube fogging, adequate chest rise and adequate pulse oximetry reading.  A chest x-ray to verify correct placement of the tube showed appropriate tube position.    The patient tolerated the procedure well.     Complications: None      Central Line Placement Procedure Note  Indication: vascular access    Consent: Unable to be obtained due to the emergent nature of this procedure.    Procedure: The patient was positioned appropriately and the skin over the right internal jugular vein was prepped with chlorhexidine. Local anesthesia was obtained by infiltration using 1% Lidocaine without epinephrine.  Ultrasound was used to identify the internal jugular vein. A large bore needle was inserted.  A guide wire was then inserted into the vein through the needle. A triple lumen catheter was then inserted  into the vessel over the guide wire using the Seldinger technique.  All ports showed good, free flowing blood return and were flushed with saline solution.  The catheter was then securely fastened to the skin with sutures and covered with a sterile dressing.  A post procedure X-ray was ordered and showed good line position.    The patient tolerated the procedure well.    Complications: None              Labs:  Results for orders placed or performed during the hospital encounter of 06/02/17   LACTIC ACID   Result Value Ref Range    Lactic Acid 2.0 0.5 - 2.0 mmol/L   CBC WITH DIFFERENTIAL   Result Value Ref Range    WBC 13.3 (H) 4.8 - 10.8 K/uL    RBC 2.74 (L) 4.20 - 5.40 M/uL    Hemoglobin 7.9 (L) 12.0 - 16.0 g/dL    Hematocrit 26.3 (L) 37.0 - 47.0 %    MCV 96.0 81.4 - 97.8 fL    MCH 28.8 27.0 - 33.0 pg    MCHC 30.0 (L) 33.6 - 35.0 g/dL    RDW 59.8 (H) 35.9 - 50.0 fL    Platelet Count 243 164 - 446 K/uL    MPV 9.1 9.0 - 12.9 fL    Neutrophils-Polys 83.90 (H) 44.00 - 72.00 %    Lymphocytes 6.20 (L) 22.00 - 41.00 %    Monocytes 7.40 0.00 - 13.40 %    Eosinophils 1.40 0.00 - 6.90 %    Basophils 0.30 0.00 - 1.80 %    Immature Granulocytes 0.80 0.00 - 0.90 %    Nucleated RBC 0.00 /100 WBC    Neutrophils (Absolute) 11.19 (H) 2.00 - 7.15 K/uL    Lymphs (Absolute) 0.82 (L) 1.00 - 4.80 K/uL    Monos (Absolute) 0.99 (H) 0.00 - 0.85 K/uL    Eos (Absolute) 0.18 0.00 - 0.51 K/uL    Baso (Absolute) 0.04 0.00 - 0.12 K/uL    Immature Granulocytes (abs) 0.10 0.00 - 0.11 K/uL    NRBC (Absolute) 0.00 K/uL   COMP METABOLIC PANEL   Result Value Ref Range    Sodium 139 135 - 145 mmol/L    Potassium 4.6 3.6 - 5.5 mmol/L    Chloride 104 96 - 112 mmol/L    Co2 26 20 - 33 mmol/L    Anion Gap 9.0 0.0 - 11.9    Glucose 146 (H) 65 - 99 mg/dL    Bun 30 (H) 8 - 22 mg/dL    Creatinine 1.45 (H) 0.50 - 1.40 mg/dL    Calcium 8.4 (L) 8.5 - 10.5 mg/dL    AST(SGOT) 32 12 - 45 U/L    ALT(SGPT) 19 2 - 50 U/L    Alkaline Phosphatase 68 30 - 99 U/L    Total  Bilirubin 0.6 0.1 - 1.5 mg/dL    Albumin 2.8 (L) 3.2 - 4.9 g/dL    Total Protein 5.4 (L) 6.0 - 8.2 g/dL    Globulin 2.6 1.9 - 3.5 g/dL    A-G Ratio 1.1 g/dL   URINALYSIS   Result Value Ref Range    Micro Urine Req Microscopic     Color Yellow     Character Clear     Specific Gravity 1.007 <1.035    Ph 5.5 5.0-8.0    Glucose Negative Negative mg/dL    Ketones Negative Negative mg/dL    Protein 70 (A) Negative mg/dL    Bilirubin Negative Negative    Nitrite Negative Negative    Leukocyte Esterase Negative Negative    Occult Blood Negative Negative   APTT   Result Value Ref Range    APTT 31.9 24.7 - 36.0 sec   PROTHROMBIN TIME   Result Value Ref Range    PT 14.0 12.0 - 14.6 sec    INR 1.05 0.87 - 1.13   MAGNESIUM   Result Value Ref Range    Magnesium 1.6 1.5 - 2.5 mg/dL   TROPONIN   Result Value Ref Range    Troponin I 0.02 0.00 - 0.04 ng/mL   Procalcitonin   Result Value Ref Range    Procalictonin 0.39 (H) <0.25 ng/mL   COD (ADULT)   Result Value Ref Range    ABO Grouping Only O     Antibody Screen-Cod NEG     Rh Grouping Only POS    URINE MICROSCOPIC (W/UA)   Result Value Ref Range    WBC 0-2 /hpf    Bacteria Few (A) None /hpf    Mucous Threads Few /hpf    Amorphous Crystal Present /hpf    Hyaline Cast 0-2 /lpf   ESTIMATED GFR   Result Value Ref Range    GFR If  43 (A) >60 mL/min/1.73 m 2    GFR If Non  36 (A) >60 mL/min/1.73 m 2   EKG   Result Value Ref Range    Report       [facility] Emergency Dept.    Test Date:  2017  Pt Name:    CHAITANYA CABELLO            Department: ER  MRN:        [id number]                      Room:       RD 04  Gender:     F                            Technician: [id number]  :        [id number]                   Requested By:RILEY REYNOSO  Order #:    [id number]                    Reading MD: Me    Measurements  Intervals                                Axis  Rate:       76                           P:          63  MI:         164                           QRS:        32  QRSD:       80                           T:          80  QT:         388  QTc:        437    Interpretive Statements  SINUS RHYTHM  CONSIDER LEFT VENTRICULAR HYPERTROPHY  Compared to ECG 05/07/2017 21:06:52  ST (T wave) deviation no longer present           COURSE & MEDICAL DECISION MAKING  Patient resents to the ER with respiratory distress. She has poor ventilation. She is confused and weak. She requires airway management. She was intubated as above. Her right internal jugular central venous catheter was inserted as above. Chest x-ray shows pneumonia. She was treated for healthcare associated pneumonia with vancomycin and Zosyn. She is given sepsis fluid bolus. She is given subsequent fluid bolus. She started on Levophed because of persistent hypotension. Dr. Huynh was consulted for admission. Dr. Vela was consulted with pulmonary critical care    FINAL IMPRESSION  1. Septic shock  2. Pneumonia  3. Respiratory failure   4. Chronic anemia  5. Endotracheal intubation by me  6. Right internal jugular central venous catheter insertion by me    CRITICAL CARE TIME 30 minutes  There was a very real possibility of deterioration of the patient's condition.  This patient required the highest level of care.  I provided critical care services which included: review of the medical record, treatment orders, ordering and reviewing test results, frequent reevaluation of the patient's condition and response to treatment, as well as discussing the case with appropriate personnel and various consultants. The critical care time associated with the care of this patient is exclusive of any procedures or specific interventions.        This dictation was created using voice recognition software. The accuracy of the dictation is limited to the abilities of the software.  The nursing notes were reviewed and certain aspects of this information were incorporated into this note.      Electronically  signed by: Adam Bae, 6/2/2017 8:46 AM

## 2017-06-02 NOTE — PROGRESS NOTES
Pt arrived to room S-105. Pt transported via gurney with transport monitor in use. VSS during transport. Personal belongings, medications and chart transported with pt. Pt transported by 2 ICU RNs and RT.

## 2017-06-02 NOTE — CARE PLAN
Problem: Mechanical Ventilation  Goal: Safe management of artificial airway and ventilation  Outcome: PROGRESSING AS EXPECTED  Intervention: Suctioning and care of ET/Trach tube  Continuous pulse oximetry in use. Collaboration with RT. Head of bed elevated. chlorahexadine oral solution administered. Pt suctioned as needed. Oral care provided Q 2 hours. Peptic ulcer prophylaxis in use. Sequentials in use. ambu bag at bedside.       Problem: Hemodynamic Status  Goal: Vital Signs and Fluid Balance Management  Outcome: PROGRESSING AS EXPECTED  Intervention: Hemodynamic Monitoring  Continuous pulse oximetry in use. Continuous cardiac monitoring in use. Cardiac leads changed. I/O's monitored. Daily weights taken. Vasopressor therapy in use. See mar.

## 2017-06-02 NOTE — TELEPHONE ENCOUNTER
Left voicemail for patient asking her to call me back at medical oncology. She needs to be scheduled for a hematology new patient appointment with Dr. Olea.     Ref: Dr. Lashay Flood  Dx: iron deficiency anemia

## 2017-06-02 NOTE — IP AVS SNAPSHOT
" Home Care Instructions                                                                                                                  Name:Priya Callahan  Medical Record Number:[id number]  CSN: [id number]    YOB: 1950   Age: 66 y.o.  Sex: female  HT:1.626 m (5' 4\") WT: 104 kg (229 lb 4.5 oz)          Admit Date: 6/2/2017     Discharge Date:   Today's Date: 6/9/2017  Attending Doctor:  Lucian Fung M.D.                  Allergies:  Vitamin c; Keflex; Codeine; Lyrica; and Sudafed            Discharge Instructions       Discharge Instructions    Discharged to other by [facility] van with self. Discharged via wheelchair, hospital escort: Yes.  Special equipment needed: Oxygen    Be sure to schedule a follow-up appointment with your primary care doctor or any specialists as instructed.     Discharge Plan:   Diet Plan: Discussed  Activity Level: Discussed  Smoking Cessation Offered: Patient Refused  Confirmed Follow up Appointment: Appointment Scheduled  Confirmed Symptoms Management: Discussed  Medication Reconciliation Updated: Yes  Influenza Vaccine Indication: Indicated: Not available from distributor/    I understand that a diet low in cholesterol, fat, and sodium is recommended for good health. Unless I have been given specific instructions below for another diet, I accept this instruction as my diet prescription.   Other diet: 2g sodium diet/ADA  Dysphagia 3 with thin liquids    Special Instructions:   HF Patient Discharge Instructions  · Monitor your weight daily, and maintain a weight chart, to track your weight changes.   · Activity as tolerated, unless your Doctor has ordered otherwise. Other activity order: ***.  · Follow a low fat, low cholesterol, low salt diet unless instructed otherwise by your Doctor. Read the labels on the back of food products and track your intake of fat, cholesterol and salt.   · Fluid Restriction No. If a Fluid Restriction has been ordered by your " Doctor, measure fluids with a measuring cup to ensure that you are not exceeding the restriction.   · No smoking.  · Oxygen Yes. If your Doctor has ordered that you wear Oxygen at home, it is important to wear it as ordered.  · Did you receive an explanation from staff on the importance of taking each of your medications and why it is necessary to keep taking them unless your doctor says to stop? Yes  · Were all of your questions answered about how to manage your heart failure and what to do if you have increased signs and symptoms after you go home? Yes  · Do you feel like your heart failure care team involved you in the care treatment plan and allowed you to make decisions regarding your care while in the hospital and addressed any discharge needs you might have? Yes    See the educational handout provided at discharge for more information on monitoring your daily weight, activity and diet. This also explains more about Heart Failure, symptoms of a flare-up and some of the tests that you have undergone.     Warning Signs of a Flare-Up include:  · Swelling in the ankles or lower legs.  · Shortness of breath, while at rest, or while doing normal activities.   · Shortness of breath at night when in bed, or coughing in bed.   · Requiring more pillows to sleep at night, or needing to sit up at night to sleep.  · Feeling weak, dizzy or fatigued.     When to call your Doctor:  · Call [facility] seven days a week from 8:00 a.m. to 8:00 p.m. for medical questions [phone].  · Call your Primary Care Physician or Cardiologist if:   1. You experience any pain radiating to your jaw or neck.  2. You have any difficulty breathing.  3. You experience weight gain of 3 lbs in a day or 5 lbs in a week.   4. You feel any palpitations or irregular heartbeats.  5. You become dizzy or lose consciousness.   If you have had an angiogram or had a pacemaker or AICD placed, and experience:  1. Bleeding, drainage or swelling  at the surgical / puncture site.  2. Fever greater than 100.0 F  3. Shock from internal defibrillator.  4. Cool and / or numb extremities.      · Is patient discharged on Warfarin / Coumadin?   No     · Is patient Post Blood Transfusion?  Yes  POST BLOOD TRANSFUSION INFORMATION (ADULT)    The purpose of blood transfusion is to correct anemia, low levels of blood clotting factors or to correct acute blood loss.   Blood transfusion is very safe but occasionally unexpected adverse reactions do occur. Most adverse reactions occur during transfusion, within one to two days following transfusion or one to two weeks following transfusion. Some adverse reactions can occur one to six months after transfusion and some even years later.             If any of the symptoms listed below happen to you during your transfusion,                                 please notify your nurse immediately.   · Fever or Chills  · Flushing of the Face  · Hives, rash or itching  · Difficulty in breathing or shortness of breath  · Pain or oozing of blood from the IV needle site  · Low back pain  · Nausea or vomiting  · Weakness or fainting  · Chest pain  · Blood in the urine  · Decreased frequency of urination    The above symptoms may also occur within 24 to 48 after transfusion; if so, notify your physician.     · Yellowing of the skin    This can happen one to six months after transfusion; if so, notify your physician    Depression / Suicide Risk    As you are discharged from this [facility] facility, it is important to learn how to keep safe from harming yourself.    Recognize the warning signs:  · Abrupt changes in personality, positive or negative- including increase in energy   · Giving away possessions  · Change in eating patterns- significant weight changes-  positive or negative  · Change in sleeping patterns- unable to sleep or sleeping all the time   · Unwillingness or inability to communicate  · Depression  · Unusual sadness,  discouragement and loneliness  · Talk of wanting to die  · Neglect of personal appearance   · Rebelliousness- reckless behavior  · Withdrawal from people/activities they love  · Confusion- inability to concentrate     If you or a loved one observes any of these behaviors or has concerns about self-harm, here's what you can do:  · Talk about it- your feelings and reasons for harming yourself  · Remove any means that you might use to hurt yourself (examples: pills, rope, extension cords, firearm)  · Get professional help from the community (Mental Health, Substance Abuse, psychological counseling)  · Do not be alone:Call your Safe Contact- someone whom you trust who will be there for you.  · Call your local CRISIS HOTLINE 893-7531 or 559-117-4134  · Call your local Children's Mobile Crisis Response Team [place] (677) 424-8183 or www.Kannuu  · Call the toll free National Suicide Prevention Hotlines   · National Suicide Prevention Lifeline 667-414-SAVX (9567)  · National Hope Line Network 800-SUICIDE (939-8284)        Your appointments     Josh 10, 2017  8:05 AM   Adult Draw/Collection with LAB SKILLED NURSING   LAB - SKILLED NURSING (--)    [address] [facility] [address]   [phone]            Josh 15, 2017 11:20 AM   NEW TO YOU with Kavitha Mccall M.D.   [facility])    [address] [place] [id number] [phone]            Jun 26, 2017 11:40 AM   Telemedicine Clinic Established Pt with A Rotation, [facility], [facility] [place]   Centralized Scheduling (--)    [address] [facility]  [place] [address]   [phone]            Aug 09, 2017  8:30 AM   Telemedicine Clinic New Patient with Parvez Garcia M.D., [facility], [facility]   [facility]    [address] [facility] [place] [address] [phone]                 Discharge Medication Instructions:    Below are  the medications your physician expects you to take upon discharge:    Review all your home medications and newly ordered medications with your doctor and/or pharmacist. Follow medication instructions as directed by your doctor and/or pharmacist.    Please keep your medication list with you and share with your physician.               Medication List      START taking these medications        Instructions    Morning Afternoon Evening Bedtime    furosemide 20 MG Tabs   Start taking on:  6/10/2017   Last time this was given:  20 mg on 6/8/2017  4:30 PM   Commonly known as:  LASIX        Take 1 Tab by mouth every day.   Dose:  20 mg                        loperamide 2 MG Caps   Last time this was given:  2 mg on 6/7/2017  1:54 PM   Commonly known as:  IMODIUM        Take 1 Cap by mouth 4 times a day as needed for Diarrhea.   Dose:  2 mg                        potassium chloride SA 20 MEQ Tbcr   Last time this was given:  20 mEq on 6/9/2017  8:39 AM   Commonly known as:  Kdur        Take 1 Tab by mouth 2 Times a Day.   Dose:  20 mEq                          CHANGE how you take these medications        Instructions    Morning Afternoon Evening Bedtime    * omeprazole 20 MG delayed-release capsule   What changed:  Another medication with the same name was added. Make sure you understand how and when to take each.   Last time this was given:  40 mg on 6/9/2017  8:38 AM   Commonly known as:  PRILOSEC        Take 1 Cap by mouth every day.   Dose:  20 mg                        * omeprazole 40 MG delayed-release capsule   What changed:  You were already taking a medication with the same name, and this prescription was added. Make sure you understand how and when to take each.   Last time this was given:  40 mg on 6/9/2017  8:38 AM   Commonly known as:  PRILOSEC        Take 1 Cap by mouth every day.   Dose:  40 mg                        * Notice:  This list has 2 medication(s) that are the same as other medications prescribed  for you. Read the directions carefully, and ask your doctor or other care provider to review them with you.      CONTINUE taking these medications        Instructions    Morning Afternoon Evening Bedtime    amlodipine 10 MG Tabs   Last time this was given:  10 mg on 6/9/2017  8:38 AM   Commonly known as:  NORVASC        Take 1 Tab by mouth every day.   Dose:  10 mg                        budesonide-formoterol 160-4.5 MCG/ACT Aero   Last time this was given:  2 Puffs on 6/9/2017  8:41 AM   Commonly known as:  SYMBICORT        Inhale 2 Puffs by mouth 2 Times a Day.   Dose:  2 Puff                        carvedilol 3.125 MG Tabs   Last time this was given:  3.125 mg on 6/9/2017  8:39 AM   Commonly known as:  COREG        Take 3.125 mg by mouth 2 times a day, with meals.   Dose:  3.125 mg                        COMBIVENT RESPIMAT  MCG/ACT Aers   Generic drug:  ipratropium-albuterol        Inhale 1 Puff by mouth every 6 hours as needed.   Dose:  1 Puff                        * cyanocobalamin 1000 MCG/ML Soln   Commonly known as:  VITAMIN B-12        1,000 mcg by Intramuscular route every Saturday.   Dose:  1000 mcg                        * vitamin B-12 1000 MCG Tabs        Take 1,000 mcg by mouth every evening.   Dose:  1000 mcg                        cyclobenzaprine 10 MG Tabs   Last time this was given:  10 mg on 6/8/2017  8:45 PM   Commonly known as:  FLEXERIL        Take 10 mg by mouth 3 times a day as needed.   Dose:  10 mg                        docusate sodium 100 MG Caps   Commonly known as:  COLACE        Take 100 mg by mouth 2 times a day.   Dose:  100 mg                        duloxetine 60 MG Cpep delayed-release capsule   Last time this was given:  60 mg on 6/9/2017  8:38 AM   Commonly known as:  CYMBALTA        Take 1 Cap by mouth every day.   Dose:  60 mg                        ferrous gluconate 324 (38 FE) MG Tabs   Commonly known as:  FERGON        Take 1 Tab by mouth 2 times a day, with meals.      Dose:  324 mg                        folic acid 800 MCG tablet   Commonly known as:  FOLVITE        Take 800 mcg by mouth every day.   Dose:  800 mcg                        gabapentin 600 MG tablet   Commonly known as:  NEURONTIN        Take 1 Tab by mouth 3 times a day.   Dose:  600 mg                        hydrocortisone 5 MG Tabs   Commonly known as:  CORTEF        Take 5 mg by mouth every day.   Dose:  5 mg                        insulin glargine 100 UNIT/ML Soln   Last time this was given:  20 Units on 6/8/2017  8:51 PM   Commonly known as:  LANTUS        Inject 22 Units as instructed every evening.   Dose:  22 Units                        insulin lispro 100 UNIT/ML Soln   Last time this was given:  3 Units on 6/9/2017 11:50 AM   Commonly known as:  HUMALOG        Inject 2-9 Units as instructed 4 Times a Day,Before Meals and at Bedtime.   Dose:  2-9 Units                        levothyroxine 75 MCG Tabs   Last time this was given:  75 mcg on 6/9/2017  6:38 AM   Commonly known as:  SYNTHROID        TAKE 1 TABLET BY MOUTH DAILY                        montelukast 10 MG Tabs   Last time this was given:  10 mg on 6/8/2017  8:45 PM   Commonly known as:  SINGULAIR        Take 1 Tab by mouth every day.   Dose:  10 mg                        multivitamin Tabs        Take 1 Tab by mouth every day.   Dose:  1 Tab                        nicotine 7 MG/24HR Pt24   Commonly known as:  NICODERM        Apply 1 Patch to skin as directed every 24 hours.   Dose:  1 Patch                        oxycodone immediate release 10 MG immediate release tablet   Last time this was given:  10 mg on 6/9/2017  1:24 PM   Commonly known as:  ROXICODONE        Take 10 mg by mouth every 3 hours as needed for Moderate Pain.   Dose:  10 mg                        senna-docusate 8.6-50 MG Tabs   Last time this was given:  2 Tabs on 6/4/2017  8:22 AM   Commonly known as:  PERICOLACE or SENOKOT S        Take 1 Tab by mouth every evening.   Dose:  1  Tab                        tiotropium 18 MCG Caps   Last time this was given:  1 Cap on 6/9/2017 10:15 AM   Commonly known as:  SPIRIVA        Inhale 1 Cap by mouth every day.   Dose:  18 mcg                        trazodone 150 MG Tabs   Last time this was given:  300 mg on 6/8/2017  8:45 PM   Commonly known as:  DESYREL        Take 300 mg by mouth every evening.   Dose:  300 mg                        Vitamin D3 2000 UNITS Tabs        Take 2,000 Units by mouth every day.   Dose:  2000 Units                        * Notice:  This list has 2 medication(s) that are the same as other medications prescribed for you. Read the directions carefully, and ask your doctor or other care provider to review them with you.         Where to Get Your Medications      Information about where to get these medications is not yet available     ! Ask your nurse or doctor about these medications    - furosemide 20 MG Tabs  - loperamide 2 MG Caps  - omeprazole 40 MG delayed-release capsule  - potassium chloride SA 20 MEQ Tbcr            Instructions           Diet / Nutrition:    Follow any diet instructions given to you by your doctor or the dietician, including how much salt (sodium) you are allowed each day.    If you are overweight, talk to your doctor about a weight reduction plan.    Activity:    Remain physically active following your doctor's instructions about exercise and activity.    Rest often.     Any time you become even a little tired or short of breath, SIT DOWN and rest.    Worsening Symptoms:    Report any of the following signs and symptoms to the doctor's office immediately:    *Pain of jaw, arm, or neck  *Chest pain not relieved by medication                               *Dizziness or loss of consciousness  *Difficulty breathing even when at rest   *More tired than usual                                       *Bleeding drainage or swelling of surgical site  *Swelling of feet, ankles, legs or stomach                  *Fever (>100ºF)  *Pink or blood tinged sputum  *Weight gain (3lbs/day or 5lbs /week)           *Shock from internal defibrillator (if applicable)  *Palpitations or irregular heartbeats                *Cool and/or numb extremities    Stroke Awareness    Common Risk Factors for Stroke include:    Age  Atrial Fibrillation  Carotid Artery Stenosis  Diabetes Mellitus  Excessive alcohol consumption  High blood pressure  Overweight   Physical inactivity  Smoking    Warning signs and symptoms of a stroke include:    *Sudden numbness or weakness of the face, arm or leg (especially on one side of the body).  *Sudden confusion, trouble speaking or understanding.  *Sudden trouble seeing in one or both eyes.  *Sudden trouble walking, dizziness, loss of balance or coordination.Sudden severe headache with no known cause.    It is very important to get treatment quickly when a stroke occurs. If you experience any of the above warning signs, call 911 immediately.                   Disclaimer         Quit Smoking / Tobacco Use:    I understand the use of any tobacco products increases my chance of suffering from future heart disease or stroke and could cause other illnesses which may shorten my life. Quitting the use of tobacco products is the single most important thing I can do to improve my health. For further information on smoking / tobacco cessation call a Toll Free Quit Line at 1-127.371.7077 (*National Cancer [place]) or 1-594.218.2002 (American Lung Association) or you can access the web based program at www.lungusa.org.    [place] Tobacco Users Help Line:  (972) 621-6482       Toll Free: 1-516.901.2947  Quit Tobacco Program [facility] (268)150-4417    Crisis Hotline:    [place] Crisis Hotline:  4-853-CNFTRLI or 1-667.315.2601    [place] Crisis Hotline:    1-968.181.4859 or 922-846-5268    Discharge Survey:   [facility]. We hope we did everything we could to make your  hospital stay a pleasant one. You may be receiving a phone survey and we would appreciate your time and participation in answering the questions. Your input is very valuable to us in our efforts to improve our service to our patients and their families.        My signature on this form indicates that:    1. I have reviewed and understand the above information.  2. My questions regarding this information have been answered to my satisfaction.  3. I have formulated a plan with my discharge nurse to obtain my prescribed medications for home.                  Disclaimer         __________________________________                     __________       ________                       Patient Signature                                                 Date                    Time

## 2017-06-02 NOTE — IP AVS SNAPSHOT
BitPay Access Code: [email or web address]  Expires: 7/9/2017  2:53 PM    Your email address is not on file at [facility].  Email Addresses are required for you to sign up for BitPay, please contact [phone] to verify your personal information and to provide your email address prior to attempting to register for BitPay.    Priya Burt London  [address] [place] [address]    BitPay  A secure, online tool to manage your health information     [employer]’s BitPay® is a secure, online tool that connects you to your personalized health information from the privacy of your home -- day or night - making it very easy for you to manage your healthcare. Once the activation process is completed, you can even access your medical information using the BitPay mercy, which is available for free in the Apple Mercy store or Google Play store.     To learn more about BitPay, visit [email or web address]    There are two levels of access available (as shown below):   My Chart Features  [facility] Primary Care Doctor [facility]  Specialists [facility]  Urgent  Care Non-[facility] Primary Care Doctor   Email your healthcare team securely and privately 24/7 X X X    Manage appointments: schedule your next appointment; view details of past/upcoming appointments X      Request prescription refills. X      View recent personal medical records, including lab and immunizations X X X X   View health record, including health history, allergies, medications X X X X   Read reports about your outpatient visits, procedures, consult and ER notes X X X X   See your discharge summary, which is a recap of your hospital and/or ER visit that includes your diagnosis, lab results, and care plan X X  X     How to register for BitPay:  Once your e-mail address has been verified, follow the following steps to sign up for BitPay.     1. Go to  [email or web address]  2. Click on the Sign Up Now box, which takes you to the New Member Sign Up page.  You will need to provide the following information:  a. Enter your [employer] Access Code exactly as it appears at the top of this page. (You will not need to use this code after you’ve completed the sign-up process. If you do not sign up before the expiration date, you must request a new code.)   b. Enter your date of birth.   c. Enter your home email address.   d. Click Submit, and follow the next screen’s instructions.  3. Create a [employer] ID. This will be your [employer] login ID and cannot be changed, so think of one that is secure and easy to remember.  4. Create a [employer] password. You can change your password at any time.  5. Enter your Password Reset Question and Answer. This can be used at a later time if you forget your password.   6. Enter your e-mail address. This allows you to receive e-mail notifications when new information is available in [employer].  7. Click Sign Up. You can now view your health information.    For assistance activating your [employer] account, call [phone]

## 2017-06-02 NOTE — PROGRESS NOTES
Cortrak Placement    Tube Team verified patient name and medical record number prior to tube placement.  Cortrak tube (55 inches, 10 [place]) placed at 85 cm in right nare.  Per Cortrak picture, tube appears to be in the small bowel.  Nursing Instructions: Awaiting KUB to confirm placement before use for medications or feeding. Once placement confirmed, flush tube with 30 ml of water, and then remove and save stylet, in patient medication drawer.

## 2017-06-02 NOTE — DISCHARGE PLANNING
Medical Social Work    MSW received call from bedside RN, stating that pt is intubated and family needs to be notified. MSW looked in chart and found pt's advanced directive which was from . The advanced directive listed pt's son Tristen Hung, with secondary being her brother Uzair Zavala. MSW called pt's brother Uzair Zavala ([phone]), who stated her son Tristen  in  and he is the only living relative. MSW updated Uzair on pt's status. MSW to update Unit SW and updated bedside RN.

## 2017-06-02 NOTE — H&P
DATE OF ADMISSION:  06/02/2017    TRANSFERRING FACILITY:  [facility].    PRIMARY CARE PHYSICIAN:  Jean Brandon MD in [facility].    CHIEF COMPLAINT:  Shortness of breath, fever.    HISTORY OF PRESENT ILLNESS:  Please note patient was intubated and sedated in   the emergency room prior to my evaluation.  All information has been obtained   from prior records as well as Dr. Adam Bae.    This is a 66-year-old female reportedly she was at [facility] for rehabilitation   after a lower extremity fracture on the left lower leg.  She was at [facility] being treated for UTI.  There she was noted to be febrile with   temperature of 102.  She was having diminished breath sounds, given breathing   treatments; however, this was unhelpful and she presented to [facility], but   ultimately she was intubated.  Initially, she had high blood pressures, but   subsequently after intubation, I was told that she had dropped her blood   pressures.  She is requiring pressure support per my discussion with the   nurse/my orders and discussion with the nurse.    REVIEW OF SYSTEMS:  Unable to obtain, patient is intubated.    ALLERGIES:  PER RECORDS, VITAMIN C, KEFLEX, ZOSYN, CODEINE, LYRICA, SUDAFED.    PRIOR MEDICATIONS:  1.  Amlodipine 10 mg daily.  2.  Symbicort 160/4.5 two puffs twice a day.  3.  Coreg 3.125 mg twice a day with meals.  4.  Vitamin D3 2000 units daily.  5.  Vitamin B12 1000 mcg daily.  6.  Flexeril 10 mg 3 times a day as needed.  7.  Colace 100 mg twice a day.  8.  Cymbalta 60 mg daily.  9.  Lovenox.  I have been told that she had been refusing this per nursing.  10.  Iron gluconate 324 mg 1 tablet twice a day with meals.  11.  Diflucan 100 mg every day.  This is on a 6-day course that had been   initiated on 05/31/2017.  12.  Folic acid 800 mcg daily.  13.  Gabapentin 600 mg 3 times a day.  14.  Hydrocortisone 5 mg every day.  15.  Lantus 22 units every evening.  16.  Humalog per sliding scale  before meals and at bedtime.  17.  Combivent inhaler 1 puff every 6 hours as needed.  18.  Synthroid 75 mcg daily.  19.  Singulair 10 mg daily.  20.  Multivitamin daily.  21.  Nicotine 7 mg 1 every 24 hours.  22.  Omeprazole 20 mg daily.  23.  Roxicodone 10 mg every 3 hours as needed for moderate pain.  24.  Savannah-Colace 8.6/50 mg every evening.  25.  Spiriva 18 mcg daily.  26.  Trazodone 300 mg every evening.    PAST MEDICAL HISTORY:  1.  GERD.  2.  Hepatitis C, but not active per charting.  3.  COPD.  4.  History of asthma.  5.  Hypertension.  6.  Fibromyalgia.  7.  Neuropathy in bilateral feet.  8.  Diabetes type 2.  9.  Sleep apnea, does not wear CPAP.  10.  Posttraumatic stress disorder.  11.  History of chronic oxygen dependence.  12.  Chronic kidney disease stage III.  13.  History of atrial fibrillation.    PAST SURGICAL HISTORY:  She has had a hysterectomy, left knee replacement,   right carpal tunnel, breast biopsy, lumpectomy in the left, ankle ORIF in the   left on 05/08/2017.    SOCIAL HISTORY:  Quit smoking in February 2017.  No drugs or alcohol.    FAMILY HISTORY:  Unobtainable currently.    PHYSICAL EXAMINATION:  VITAL SIGNS:  Temperature is elevated at 107, per emergency room physician.  I   think this must be erroneous.  She had a fever of 102 that was reported, we   will follow up on this with nursing.  Blood pressures have been anywhere in   the 60s systolically to one-teens.  It was in the 80s on my evaluation of the   patient, Levophed had been initiated.  GENERAL:  This is an obese female.  She is on sedation; however, she was able   to open her eyes when I asked her, otherwise lethargic.  HEENT:  NCAT, EOMI, sclerae are anicteric.  Nares are patent.  She is orally   intubated.  NECK:  Trachea is midline.  Bilateral bruits were auscultated.  No stridor.    No adenopathy palpable.  LUNGS:  Diminished on the right.  I did not hear any crackles, fair air   movement on the left.  No accessory  muscle use.  CARDIOVASCULAR:  She is regular rate and rhythm.  I do not hear any murmurs   audible.  ABDOMEN:  Soft, nondistended.  No tympany on percussion.  No fluid wave   appreciated.  EXTREMITIES:  She has no lower extremity edema.  She has 2+ radial artery   pulses.  She appears to have some chronic stasis of the lower extremities,   some venous skin changes, 1+ dorsalis pedal pulses bilaterally.  NEUROLOGIC:  Unable to fully assess.    LABORATORY DATA:  White blood cell count is 13.3, hemoglobin is 7.9,   hematocrit is 26.3, platelet count is 243.  Comprehensive metabolic panel:    BUN of 30, creatinine is 1.45, albumin is 2.8.  Troponin 0.02.  INR is 1.05.    ABG showed pH of 7.3, pCO2 of 55, pO2 of 136, this is on taking unknown amount   of oxygen.  Cortisol level was 7.2.  Pro-calcitonin 0.39.  TSH was recently   on May 31st that was normal at 3.42.  Urine from June 2nd shows 70 protein,   otherwise unremarkable.    IMAGING:  Chest x-ray shows satisfactory endotracheal and right IJ central   venous line positioning, right lung and left perihilar pneumonia.    ASSESSMENT AND PLAN:  1.  Sepsis.  Patient will be admitted to intensive care unit.    Pressor support has been initiated.  Patient has already gotten 3 liters of   fluids in the ICU, working on her fourth liter of IV fluids.  We will check an   echocardiogram, blood cultures, checking sputum cultures if able as well.    Patient will be on broad-spectrum antibiotics.  For temperatures, we will have   Tylenol and non-steroidal anti-inflammatory drugs if needed.  We will have   cooling blanket and ice packs.  Question of aspiration versus   hospital-acquired infection.  We will monitor strict central venous pressures,   may require arterial line monitoring, strict I's and O's, keep MAP greater   than 65, systolic greater than 90.  Check TSH, cortisol level, I have ordered,   it is already back.  We will go ahead and initiate hydrocortisone as well.  2.   Chronic renal failure with acute worsening.  Giving fluids, monitor her   renal function.  Try to avoid nephrotoxic medications.  May need to have   nephrology on board as well.  We will continue to monitor.  3.  Anemia that of chronic disease, we will monitor her CBC.  4.  Acute respiratory failure as per #1 above.  Dr. Sierra Vela has been   consulted already by emergency room physician.  5.  History of diabetes mellitus.  We will have Accu-Cheks, sliding scale for   coverage for now, holding long-acting insulin.  We will initiate enteric   feedings until Cortrak has been placed.  6.  Recent left open reduction and internal fixation of the left lower   extremity.  Patient will need future ongoing PT, OT, needs to have ortho   reevaluate for removal of sutures and Steri-Strips.  Surgery was on 05/08/2017   of the trimalleolar left ankle fracture from Dr. Rico Gtz.  7.  History of obstructive sleep apnea, not using home BiPAP per patient's   choice.  8.  History of chronic obstructive pulmonary disease.  9.  History of hypertension.  10.  Currently hypotensive.  Patient has been initiated on pressor support as   per #1 above and sepsis shock.    Please note critical care time has been 40 minutes excluding any procedures.    No crossover time.       ____________________________________     DO RANDALL GU / DMITRY    DD:  06/02/2017 11:48:57  DT:  06/02/2017 12:45:46    D#:  [id number]  Job#:  [id number]

## 2017-06-02 NOTE — ED NOTES
The Medication Reconciliation process has been completed by entering the MAR from Skilled    Allergies have been reviewed

## 2017-06-02 NOTE — IP AVS SNAPSHOT
" <p align=\"LEFT\"><IMG SRC=\"//EMRWB/blob$/Images/[email or web address]\" alt=\"Image\" WIDTH=\"50%\" HEIGHT=\"200\" BORDER=\"\"></p>                   Name:Priya Callahan  Medical Record Number:[id number]  CSN: [id number]    YOB: 1950   Age: 66 y.o.  Sex: female  HT:1.626 m (5' 4\") WT: 104 kg (229 lb 4.5 oz)          Admit Date: 6/2/2017     Discharge Date:   Today's Date: 6/9/2017  Attending Doctor:  Lucian Fung M.D.                  Allergies:  Vitamin c; Keflex; Codeine; Lyrica; and Sudafed          Your appointments     Josh 10, 2017  8:05 AM   Adult Draw/Collection with LAB SKILLED NURSING   LAB - SKILLED NURSING (--)    [address] [facility] [address]   [phone]            Josh 15, 2017 11:20 AM   NEW TO YOU with Kavitha Mccall M.D.   [facility]    [address] [place] [id number] [phone]            Jun 26, 2017 11:40 AM   Telemedicine Clinic Established Pt with A Rotation, [facility], [facility]   Centralized Scheduling (--)    [address] [facility].  [facility] [id number] [phone]            Aug 09, 2017  8:30 AM   Telemedicine Clinic New Patient with Parvez Garcia M.D., [facility]INE [place], [facility]   [facility])    [address] [place] [id number] [phone]                 Medication List      Take these Medications        Instructions    amlodipine 10 MG Tabs   Commonly known as:  NORVASC    Take 1 Tab by mouth every day.   Dose:  10 mg       budesonide-formoterol 160-4.5 MCG/ACT Aero   Commonly known as:  SYMBICORT    Inhale 2 Puffs by mouth 2 Times a Day.   Dose:  2 Puff       carvedilol 3.125 MG Tabs   Commonly known as:  COREG    Take 3.125 mg by mouth 2 times a day, with meals.   Dose:  3.125 mg       COMBIVENT RESPIMAT  MCG/ACT Aers   Generic drug:  ipratropium-albuterol    Inhale 1 Puff by mouth every 6 hours as needed.   Dose:  1 " Puff       * cyanocobalamin 1000 MCG/ML Soln   Commonly known as:  VITAMIN B-12    1,000 mcg by Intramuscular route every Saturday.   Dose:  1000 mcg       * vitamin B-12 1000 MCG Tabs    Take 1,000 mcg by mouth every evening.   Dose:  1000 mcg       cyclobenzaprine 10 MG Tabs   Commonly known as:  FLEXERIL    Take 10 mg by mouth 3 times a day as needed.   Dose:  10 mg       docusate sodium 100 MG Caps   Commonly known as:  COLACE    Take 100 mg by mouth 2 times a day.   Dose:  100 mg       duloxetine 60 MG Cpep delayed-release capsule   Commonly known as:  CYMBALTA    Take 1 Cap by mouth every day.   Dose:  60 mg       ferrous gluconate 324 (38 FE) MG Tabs   Commonly known as:  FERGON    Take 1 Tab by mouth 2 times a day, with meals.   Dose:  324 mg       folic acid 800 MCG tablet   Commonly known as:  FOLVITE    Take 800 mcg by mouth every day.   Dose:  800 mcg       furosemide 20 MG Tabs   Start taking on:  6/10/2017   Commonly known as:  LASIX    Take 1 Tab by mouth every day.   Dose:  20 mg       gabapentin 600 MG tablet   Commonly known as:  NEURONTIN    Take 1 Tab by mouth 3 times a day.   Dose:  600 mg       hydrocortisone 5 MG Tabs   Commonly known as:  CORTEF    Take 5 mg by mouth every day.   Dose:  5 mg       insulin glargine 100 UNIT/ML Soln   Commonly known as:  LANTUS    Inject 22 Units as instructed every evening.   Dose:  22 Units       insulin lispro 100 UNIT/ML Soln   Commonly known as:  HUMALOG    Inject 2-9 Units as instructed 4 Times a Day,Before Meals and at Bedtime.   Dose:  2-9 Units       levothyroxine 75 MCG Tabs   Commonly known as:  SYNTHROID    TAKE 1 TABLET BY MOUTH DAILY       loperamide 2 MG Caps   Commonly known as:  IMODIUM    Take 1 Cap by mouth 4 times a day as needed for Diarrhea.   Dose:  2 mg       montelukast 10 MG Tabs   Commonly known as:  SINGULAIR    Take 1 Tab by mouth every day.   Dose:  10 mg       multivitamin Tabs    Take 1 Tab by mouth every day.   Dose:  1 Tab          nicotine 7 MG/24HR Pt24   Commonly known as:  NICODERM    Apply 1 Patch to skin as directed every 24 hours.   Dose:  1 Patch       * omeprazole 20 MG delayed-release capsule   What changed:  Another medication with the same name was added. Make sure you understand how and when to take each.   Commonly known as:  PRILOSEC    Take 1 Cap by mouth every day.   Dose:  20 mg       * omeprazole 40 MG delayed-release capsule   What changed:  You were already taking a medication with the same name, and this prescription was added. Make sure you understand how and when to take each.   Commonly known as:  PRILOSEC    Take 1 Cap by mouth every day.   Dose:  40 mg       oxycodone immediate release 10 MG immediate release tablet   Commonly known as:  ROXICODONE    Take 10 mg by mouth every 3 hours as needed for Moderate Pain.   Dose:  10 mg       potassium chloride SA 20 MEQ Tbcr   Commonly known as:  Kdur    Take 1 Tab by mouth 2 Times a Day.   Dose:  20 mEq       senna-docusate 8.6-50 MG Tabs   Commonly known as:  PERICOLACE or SENOKOT S    Take 1 Tab by mouth every evening.   Dose:  1 Tab       tiotropium 18 MCG Caps   Commonly known as:  SPIRIVA    Inhale 1 Cap by mouth every day.   Dose:  18 mcg       trazodone 150 MG Tabs   Commonly known as:  DESYREL    Take 300 mg by mouth every evening.   Dose:  300 mg       Vitamin D3 2000 UNITS Tabs    Take 2,000 Units by mouth every day.   Dose:  2000 Units       * Notice:  This list has 4 medication(s) that are the same as other medications prescribed for you. Read the directions carefully, and ask your doctor or other care provider to review them with you.

## 2017-06-02 NOTE — RESPIRATORY CARE
Cardiopulmonary Resuscitation/Intubation Assist    Intubation assist performed Yes  Reason for intubation respiratory failure  Positive Color Change on EZCap? yes  Difficult Intubation/Number of attempts no, one    Evidence of aspiration no  Airway Group ET Tube Oral 7.5-Secured At  (cm): 23 (06/02/17 0655)

## 2017-06-03 ENCOUNTER — APPOINTMENT (OUTPATIENT)
Dept: RADIOLOGY | Facility: MEDICAL CENTER | Age: 67
DRG: 871 | End: 2017-06-03
Attending: INTERNAL MEDICINE
Payer: MEDICARE

## 2017-06-03 LAB
ANION GAP SERPL CALC-SCNC: 6 MMOL/L (ref 0–11.9)
BASE EXCESS BLDA CALC-SCNC: -1 MMOL/L (ref -4–3)
BASOPHILS # BLD AUTO: 0.2 % (ref 0–1.8)
BASOPHILS # BLD: 0.04 K/UL (ref 0–0.12)
BODY TEMPERATURE: ABNORMAL DEGREES
BUN SERPL-MCNC: 24 MG/DL (ref 8–22)
CALCIUM SERPL-MCNC: 8.2 MG/DL (ref 8.5–10.5)
CHLORIDE SERPL-SCNC: 109 MMOL/L (ref 96–112)
CO2 BLDA-SCNC: 26 MMOL/L (ref 20–33)
CO2 SERPL-SCNC: 25 MMOL/L (ref 20–33)
CREAT SERPL-MCNC: 1.25 MG/DL (ref 0.5–1.4)
EOSINOPHIL # BLD AUTO: 0 K/UL (ref 0–0.51)
EOSINOPHIL NFR BLD: 0 % (ref 0–6.9)
ERYTHROCYTE [DISTWIDTH] IN BLOOD BY AUTOMATED COUNT: 59.5 FL (ref 35.9–50)
GFR SERPL CREATININE-BSD FRML MDRD: 43 ML/MIN/1.73 M 2
GLUCOSE BLD-MCNC: 127 MG/DL (ref 65–99)
GLUCOSE BLD-MCNC: 174 MG/DL (ref 65–99)
GLUCOSE BLD-MCNC: 209 MG/DL (ref 65–99)
GLUCOSE BLD-MCNC: 265 MG/DL (ref 65–99)
GLUCOSE BLD-MCNC: 269 MG/DL (ref 65–99)
GLUCOSE SERPL-MCNC: 242 MG/DL (ref 65–99)
HCO3 BLDA-SCNC: 24.3 MMOL/L (ref 17–25)
HCT VFR BLD AUTO: 24.9 % (ref 37–47)
HGB BLD-MCNC: 7.6 G/DL (ref 12–16)
IMM GRANULOCYTES # BLD AUTO: 0.13 K/UL (ref 0–0.11)
IMM GRANULOCYTES NFR BLD AUTO: 0.8 % (ref 0–0.9)
LYMPHOCYTES # BLD AUTO: 0.46 K/UL (ref 1–4.8)
LYMPHOCYTES NFR BLD: 2.7 % (ref 22–41)
MAGNESIUM SERPL-MCNC: 1.6 MG/DL (ref 1.5–2.5)
MCH RBC QN AUTO: 28.8 PG (ref 27–33)
MCHC RBC AUTO-ENTMCNC: 30.5 G/DL (ref 33.6–35)
MCV RBC AUTO: 94.3 FL (ref 81.4–97.8)
MONOCYTES # BLD AUTO: 0.55 K/UL (ref 0–0.85)
MONOCYTES NFR BLD AUTO: 3.2 % (ref 0–13.4)
NEUTROPHILS # BLD AUTO: 15.78 K/UL (ref 2–7.15)
NEUTROPHILS NFR BLD: 93.1 % (ref 44–72)
NRBC # BLD AUTO: 0 K/UL
NRBC BLD AUTO-RTO: 0 /100 WBC
O2/TOTAL GAS SETTING VFR VENT: 50 %
PCO2 BLDA: 41.4 MMHG (ref 26–37)
PCO2 TEMP ADJ BLDA: 40.5 MMHG (ref 26–37)
PH BLDA: 7.38 [PH] (ref 7.4–7.5)
PH TEMP ADJ BLDA: 7.38 [PH] (ref 7.4–7.5)
PHOSPHATE SERPL-MCNC: 3.4 MG/DL (ref 2.5–4.5)
PLATELET # BLD AUTO: 189 K/UL (ref 164–446)
PMV BLD AUTO: 9.1 FL (ref 9–12.9)
PO2 BLDA: 91 MMHG (ref 64–87)
PO2 TEMP ADJ BLDA: 88 MMHG (ref 64–87)
POTASSIUM SERPL-SCNC: 4 MMOL/L (ref 3.6–5.5)
RBC # BLD AUTO: 2.64 M/UL (ref 4.2–5.4)
SAO2 % BLDA: 97 % (ref 93–99)
SODIUM SERPL-SCNC: 140 MMOL/L (ref 135–145)
SPECIMEN DRAWN FROM PATIENT: ABNORMAL
WBC # BLD AUTO: 17 K/UL (ref 4.8–10.8)

## 2017-06-03 PROCEDURE — 93880 EXTRACRANIAL BILAT STUDY: CPT | Mod: 26 | Performed by: SURGERY

## 2017-06-03 PROCEDURE — A9270 NON-COVERED ITEM OR SERVICE: HCPCS | Performed by: HOSPITALIST

## 2017-06-03 PROCEDURE — 84100 ASSAY OF PHOSPHORUS: CPT

## 2017-06-03 PROCEDURE — 80048 BASIC METABOLIC PNL TOTAL CA: CPT

## 2017-06-03 PROCEDURE — 700102 HCHG RX REV CODE 250 W/ 637 OVERRIDE(OP): Performed by: INTERNAL MEDICINE

## 2017-06-03 PROCEDURE — 770022 HCHG ROOM/CARE - ICU (200)

## 2017-06-03 PROCEDURE — 700111 HCHG RX REV CODE 636 W/ 250 OVERRIDE (IP): Performed by: INTERNAL MEDICINE

## 2017-06-03 PROCEDURE — 94150 VITAL CAPACITY TEST: CPT

## 2017-06-03 PROCEDURE — 94003 VENT MGMT INPAT SUBQ DAY: CPT

## 2017-06-03 PROCEDURE — 700105 HCHG RX REV CODE 258

## 2017-06-03 PROCEDURE — 700105 HCHG RX REV CODE 258: Performed by: HOSPITALIST

## 2017-06-03 PROCEDURE — 94760 N-INVAS EAR/PLS OXIMETRY 1: CPT

## 2017-06-03 PROCEDURE — 82803 BLOOD GASES ANY COMBINATION: CPT

## 2017-06-03 PROCEDURE — 99291 CRITICAL CARE FIRST HOUR: CPT | Performed by: INTERNAL MEDICINE

## 2017-06-03 PROCEDURE — 700105 HCHG RX REV CODE 258: Performed by: INTERNAL MEDICINE

## 2017-06-03 PROCEDURE — A9270 NON-COVERED ITEM OR SERVICE: HCPCS | Performed by: INTERNAL MEDICINE

## 2017-06-03 PROCEDURE — 85025 COMPLETE CBC W/AUTO DIFF WBC: CPT

## 2017-06-03 PROCEDURE — 700111 HCHG RX REV CODE 636 W/ 250 OVERRIDE (IP)

## 2017-06-03 PROCEDURE — 82962 GLUCOSE BLOOD TEST: CPT

## 2017-06-03 PROCEDURE — 83735 ASSAY OF MAGNESIUM: CPT

## 2017-06-03 PROCEDURE — 700111 HCHG RX REV CODE 636 W/ 250 OVERRIDE (IP): Performed by: HOSPITALIST

## 2017-06-03 PROCEDURE — 71010 DX-CHEST-PORTABLE (1 VIEW): CPT

## 2017-06-03 PROCEDURE — 93880 EXTRACRANIAL BILAT STUDY: CPT

## 2017-06-03 PROCEDURE — 99233 SBSQ HOSP IP/OBS HIGH 50: CPT | Performed by: HOSPITALIST

## 2017-06-03 PROCEDURE — 700102 HCHG RX REV CODE 250 W/ 637 OVERRIDE(OP): Performed by: HOSPITALIST

## 2017-06-03 RX ORDER — TIOTROPIUM BROMIDE 18 UG/1
1 CAPSULE ORAL; RESPIRATORY (INHALATION)
Status: DISCONTINUED | OUTPATIENT
Start: 2017-06-03 | End: 2017-06-03

## 2017-06-03 RX ORDER — ALBUTEROL SULFATE 90 UG/1
2 AEROSOL, METERED RESPIRATORY (INHALATION) EVERY 4 HOURS PRN
Status: DISCONTINUED | OUTPATIENT
Start: 2017-06-03 | End: 2017-06-09 | Stop reason: HOSPADM

## 2017-06-03 RX ORDER — ALBUTEROL SULFATE 90 UG/1
2 AEROSOL, METERED RESPIRATORY (INHALATION)
Status: DISCONTINUED | OUTPATIENT
Start: 2017-06-03 | End: 2017-06-03

## 2017-06-03 RX ORDER — BUDESONIDE AND FORMOTEROL FUMARATE DIHYDRATE 160; 4.5 UG/1; UG/1
2 AEROSOL RESPIRATORY (INHALATION)
Status: DISCONTINUED | OUTPATIENT
Start: 2017-06-03 | End: 2017-06-03

## 2017-06-03 RX ORDER — TIOTROPIUM BROMIDE 18 UG/1
1 CAPSULE ORAL; RESPIRATORY (INHALATION) DAILY
Status: DISCONTINUED | OUTPATIENT
Start: 2017-06-04 | End: 2017-06-09 | Stop reason: HOSPADM

## 2017-06-03 RX ORDER — LEVOTHYROXINE SODIUM 0.07 MG/1
75 TABLET ORAL
Status: DISCONTINUED | OUTPATIENT
Start: 2017-06-04 | End: 2017-06-09 | Stop reason: HOSPADM

## 2017-06-03 RX ORDER — BUDESONIDE AND FORMOTEROL FUMARATE DIHYDRATE 160; 4.5 UG/1; UG/1
2 AEROSOL RESPIRATORY (INHALATION) 2 TIMES DAILY
Status: DISCONTINUED | OUTPATIENT
Start: 2017-06-03 | End: 2017-06-09 | Stop reason: HOSPADM

## 2017-06-03 RX ADMIN — HYDROCORTISONE SODIUM SUCCINATE 50 MG: 100 INJECTION, POWDER, FOR SOLUTION INTRAMUSCULAR; INTRAVENOUS at 19:59

## 2017-06-03 RX ADMIN — OXYCODONE HYDROCHLORIDE 5 MG: 10 TABLET ORAL at 11:27

## 2017-06-03 RX ADMIN — HEPARIN SODIUM 5000 UNITS: 5000 INJECTION, SOLUTION INTRAVENOUS; SUBCUTANEOUS at 05:41

## 2017-06-03 RX ADMIN — OMEPRAZOLE 40 MG: 20 CAPSULE, DELAYED RELEASE ORAL at 08:20

## 2017-06-03 RX ADMIN — PROPOFOL 5 MCG/KG/MIN: 10 INJECTION, EMULSION INTRAVENOUS at 05:51

## 2017-06-03 RX ADMIN — FENTANYL CITRATE 25 MCG: 50 INJECTION, SOLUTION INTRAMUSCULAR; INTRAVENOUS at 07:34

## 2017-06-03 RX ADMIN — BUDESONIDE AND FORMOTEROL FUMARATE DIHYDRATE 2 PUFF: 160; 4.5 AEROSOL RESPIRATORY (INHALATION) at 19:59

## 2017-06-03 RX ADMIN — PIPERACILLIN SODIUM AND TAZOBACTAM SODIUM 4.5 G: 4; .5 INJECTION, POWDER, FOR SOLUTION INTRAVENOUS at 23:46

## 2017-06-03 RX ADMIN — GABAPENTIN 300 MG: 300 CAPSULE ORAL at 08:20

## 2017-06-03 RX ADMIN — CHLORHEXIDINE GLUCONATE 15 ML: 1.2 RINSE ORAL at 08:32

## 2017-06-03 RX ADMIN — DULOXETINE HYDROCHLORIDE 60 MG: 60 CAPSULE, DELAYED RELEASE ORAL at 08:20

## 2017-06-03 RX ADMIN — OXYCODONE HYDROCHLORIDE 10 MG: 10 TABLET ORAL at 16:04

## 2017-06-03 RX ADMIN — PIPERACILLIN SODIUM AND TAZOBACTAM SODIUM 4.5 G: 4; .5 INJECTION, POWDER, FOR SOLUTION INTRAVENOUS at 05:35

## 2017-06-03 RX ADMIN — SODIUM CHLORIDE: 9 INJECTION, SOLUTION INTRAVENOUS at 03:00

## 2017-06-03 RX ADMIN — INSULIN LISPRO 3 UNITS: 100 INJECTION, SOLUTION INTRAVENOUS; SUBCUTANEOUS at 11:32

## 2017-06-03 RX ADMIN — INSULIN LISPRO 4 UNITS: 100 INJECTION, SOLUTION INTRAVENOUS; SUBCUTANEOUS at 08:36

## 2017-06-03 RX ADMIN — MONTELUKAST SODIUM 10 MG: 10 TABLET, FILM COATED ORAL at 19:56

## 2017-06-03 RX ADMIN — VANCOMYCIN HYDROCHLORIDE 1600 MG: 100 INJECTION, POWDER, LYOPHILIZED, FOR SOLUTION INTRAVENOUS at 06:32

## 2017-06-03 RX ADMIN — SENNOSIDES AND DOCUSATE SODIUM 2 TABLET: 8.6; 5 TABLET ORAL at 08:20

## 2017-06-03 RX ADMIN — PIPERACILLIN SODIUM AND TAZOBACTAM SODIUM 4.5 G: 4; .5 INJECTION, POWDER, FOR SOLUTION INTRAVENOUS at 17:25

## 2017-06-03 RX ADMIN — INSULIN LISPRO 3 UNITS: 100 INJECTION, SOLUTION INTRAVENOUS; SUBCUTANEOUS at 05:25

## 2017-06-03 RX ADMIN — CHLORHEXIDINE GLUCONATE 15 ML: 1.2 RINSE ORAL at 19:57

## 2017-06-03 RX ADMIN — INSULIN GLARGINE 20 UNITS: 100 INJECTION, SOLUTION SUBCUTANEOUS at 19:57

## 2017-06-03 RX ADMIN — FENTANYL CITRATE 25 MCG: 50 INJECTION, SOLUTION INTRAMUSCULAR; INTRAVENOUS at 04:07

## 2017-06-03 RX ADMIN — GABAPENTIN 300 MG: 300 CAPSULE ORAL at 19:59

## 2017-06-03 RX ADMIN — LEVOTHYROXINE SODIUM 88 MCG: 88 TABLET ORAL at 06:35

## 2017-06-03 RX ADMIN — PIPERACILLIN SODIUM AND TAZOBACTAM SODIUM 4.5 G: 4; .5 INJECTION, POWDER, FOR SOLUTION INTRAVENOUS at 11:27

## 2017-06-03 RX ADMIN — OXYCODONE HYDROCHLORIDE 10 MG: 10 TABLET ORAL at 20:20

## 2017-06-03 RX ADMIN — MAGNESIUM SULFATE HEPTAHYDRATE 2 G: 500 INJECTION, SOLUTION INTRAMUSCULAR; INTRAVENOUS at 07:15

## 2017-06-03 RX ADMIN — OXYCODONE HYDROCHLORIDE 10 MG: 10 TABLET ORAL at 23:45

## 2017-06-03 RX ADMIN — HYDROCORTISONE SODIUM SUCCINATE 100 MG: 100 INJECTION, POWDER, FOR SOLUTION INTRAMUSCULAR; INTRAVENOUS at 05:42

## 2017-06-03 ASSESSMENT — PAIN SCALES - GENERAL
PAINLEVEL_OUTOF10: 6
PAINLEVEL_OUTOF10: 7
PAINLEVEL_OUTOF10: 0
PAINLEVEL_OUTOF10: 6
PAINLEVEL_OUTOF10: 4
PAINLEVEL_OUTOF10: 0

## 2017-06-03 ASSESSMENT — LIFESTYLE VARIABLES: EVER_SMOKED: YES

## 2017-06-03 ASSESSMENT — ENCOUNTER SYMPTOMS
EYES NEGATIVE: 1
HEADACHES: 0
PSYCHIATRIC NEGATIVE: 1
WEAKNESS: 1
FEVER: 0
SHORTNESS OF BREATH: 1
SPUTUM PRODUCTION: 1
GASTROINTESTINAL NEGATIVE: 1
CHILLS: 0
BACK PAIN: 1
COUGH: 1

## 2017-06-03 ASSESSMENT — COPD QUESTIONNAIRES
DO YOU EVER COUGH UP ANY MUCUS OR PHLEGM?: YES, EVERY DAY
HAVE YOU SMOKED AT LEAST 100 CIGARETTES IN YOUR ENTIRE LIFE: YES
COPD SCREENING SCORE: 6
DURING THE PAST 4 WEEKS HOW MUCH DID YOU FEEL SHORT OF BREATH: NONE/LITTLE OF THE TIME

## 2017-06-03 ASSESSMENT — PULMONARY FUNCTION TESTS: FVC: 1.4

## 2017-06-03 NOTE — PROGRESS NOTES
"Pharmacy Kinetics 66 y.o. female on vancomycin day # 2 6/3/2017    Currently on Vancomycin 1600 mg iv q24hr    Indication for Treatment: empiric HCAP    Pertinent history per medical record: Admitted on 6/2/2017 for sepsis d/t suspected pneumonia. Patient transferred from [facility] for recovery from a L ankle fracture. Patient currently being treated with fluconazole for funguria, noted to have fever and hypoxia at [facility] and transferred to [facility] for further care. Patient required intubation in ED, imaging revealed bilateral pneumonia, thus empiric antibiotics initiated for possible HCAP. Started on pressor support and hydrocortisone.    Other antibiotics: Zosyn 4.5 g IV q6h    Allergies: Vitamin c; Keflex; Codeine; Lyrica; and Sudafed     List concerns for renal function: age, CKD stage III, T2DM, low albumin, obesity (BMI = 39), recently on pressors    Pertinent cultures to date:   6/2: TA - rare GPCs, GPRs  6/2: peripheral blood cx X2 - NGTD  6/2: urine cx - NGTD  6/2: influenza by PCR - negative    Recent Labs      06/02/17   0745  06/03/17   0520   WBC  13.3*  17.0*   NEUTSPOLYS  83.90*  93.10*     Recent Labs      06/02/17   0745  06/03/17   0520   BUN  30*  24*   CREATININE  1.45*  1.25   ALBUMIN  2.8*   --      No results for input(s): VANCOTROUGH, VANCOPEAK, VANCORANDOM in the last 72 hours.  Intake/Output Summary (Last 24 hours) at 06/03/17 1137  Last data filed at 06/03/17 1000   Gross per 24 hour   Intake 5034.98 ml   Output   2070 ml   Net 2964.98 ml      Blood pressure 120/74, pulse 66, temperature 39.1 °C (102.3 °F), resp. rate 20, height 1.626 m (5' 4\"), weight 102.4 kg (225 lb 12 oz), SpO2 96 %. No data recorded.      A/P   1. Vancomycin dose change: none indicated  2. Next vancomycin level: 6/4 @ 0600 (ordered)  3. Goal trough: 16-20 mcg/mL  4. Comments: Renal indices improving. Patient weaned off pressors at this time. NGTD on cultures. Given number of risk factors for accumulation, " trough ordered prior to 3rd dose to evaluate clearance. CTM.    Juliocesar Ramon, KhoiD

## 2017-06-03 NOTE — CARE PLAN
Problem: Hemodynamic Status  Goal: Vital Signs and Fluid Balance Management  Vital signs monitored continuously, CVP monitoring in place, monitor I & O

## 2017-06-03 NOTE — FLOWSHEET NOTE
06/03/17 1020   Vent Clock   Vent Discontinued Yes   Events/Summary/Plan   Events/Summary/Plan Patient extubated. no complications nor distress noted. No strider noted. bilat breath sounds.   General Vent Information   Pulse Oximetry 96 %   Heart Rate (Monitored) 73   Ballard Vent   Ballard Vent Mode Standby Spont

## 2017-06-03 NOTE — PROGRESS NOTES
[facility] Hospitalist Progress Note    Date of Service: 6/3/2017    Chief Complaint  66 y.o. female admitted 2017 with resp. Failure, admitted from SNF, Pt with recent hospitalization, ankle Fx, UTI, chr. Obesity, COPD and h/o tobacco abuse    Interval Problem Update  Patient seen and examined today. ICU Care  Care and plan discussed in IDT/Hot rounds.  Lines and assistive devices reviewed.    Patient tolerating treatment and therapies.  All Data, Medication data reviewed.  Case discussed with nursing as available.  Plan of Care reviewed with patient and notified of changes.  6/3 Pt off pressors this am, awake and writing notes, for SBT's and poss. Extubation  H/o CKD, labs better this am,Pt only remembers N/V prior to the event    Consultants/Specialty  Pulm,CC    Disposition  ICU, post extubation        Review of Systems   Constitutional: Positive for malaise/fatigue. Negative for fever and chills.   HENT: Negative.    Eyes: Negative.    Respiratory: Positive for cough, sputum production and shortness of breath.    Cardiovascular: Positive for leg swelling. Negative for chest pain.   Gastrointestinal: Negative.    Genitourinary: Negative.    Musculoskeletal: Positive for back pain and joint pain.   Skin: Positive for rash.   Neurological: Positive for weakness. Negative for headaches.   Endo/Heme/Allergies: Negative.    Psychiatric/Behavioral: Negative.    All other systems reviewed and are negative.     Physical Exam  Laboratory/Imaging   Hemodynamics  No data recorded.   Monitored Temp: 36.8 °C (98.2 °F)  Pulse  Av.6  Min: 49  Max: 81 Heart Rate (Monitored): 73  NIBP: 149/66 mmHg CVP (mm Hg): (!) 13 MM HG    Respiratory  Ballard Vent Mode: Vent Standby, Rate (breaths/min): 24, PEEP/CPAP: 8, PEEP/CPAP: 8, FiO2: 50, P Peak (PIP): 22, P MEAN: 12   Respiration: 20, Pulse Oximetry: 96 %     Work Of Breathing / Effort: Vented  RUL Breath Sounds: Crackles, RML Breath Sounds: Diminished, RLL Breath Sounds:  Diminished, BIRD Breath Sounds: Crackles, LLL Breath Sounds: Diminished    Fluids    Intake/Output Summary (Last 24 hours) at 06/03/17 1226  Last data filed at 06/03/17 1000   Gross per 24 hour   Intake 4438.73 ml   Output   1895 ml   Net 2543.73 ml       Nutrition  Orders Placed This Encounter   Procedures   • Diet NPO     Standing Status: Standing      Number of Occurrences: 1      Standing Expiration Date:      Order Specific Question:  Type:     Answer:  Now [1]     Order Specific Question:  Restrict to:     Answer:  Strict [1]     Physical Exam   Constitutional: She is oriented to person, place, and time. She appears well-developed and well-nourished.   HENT:   Head: Normocephalic and atraumatic.   Nose: Nose normal.   Mouth/Throat: Oropharynx is clear and moist.   Eyes: Conjunctivae and EOM are normal. Pupils are equal, round, and reactive to light.   Neck: Normal range of motion. Neck supple. No JVD present. No thyromegaly present.   Cardiovascular: Normal rate, regular rhythm and normal heart sounds.  Exam reveals no gallop and no friction rub.    Pulmonary/Chest: Effort normal. She has decreased breath sounds. She has no wheezes. She has rhonchi. She has no rales.   Abdominal: Soft. Bowel sounds are normal. She exhibits no distension and no mass. There is no tenderness. There is no rebound and no guarding.   Musculoskeletal: Normal range of motion. She exhibits no edema or tenderness.   Lymphadenopathy:     She has no cervical adenopathy.   Neurological: She is alert and oriented to person, place, and time. No cranial nerve deficit.   Skin: Skin is warm and dry. She is not diaphoretic.   echymosis  LE ankle incision healing  Sutures removed   Psychiatric: She has a normal mood and affect. Her behavior is normal.   Nursing note and vitals reviewed.      Recent Labs      06/02/17   0745  06/03/17   0520   WBC  13.3*  17.0*   RBC  2.74*  2.64*   HEMOGLOBIN  7.9*  7.6*   HEMATOCRIT  26.3*  24.9*   MCV  96.0   94.3   MCH  28.8  28.8   MCHC  30.0*  30.5*   RDW  59.8*  59.5*   PLATELETCT  243  189   MPV  9.1  9.1     Recent Labs      06/02/17   0745  06/03/17   0520   SODIUM  139  140   POTASSIUM  4.6  4.0   CHLORIDE  104  109   CO2  26  25   GLUCOSE  146*  242*   BUN  30*  24*   CREATININE  1.45*  1.25   CALCIUM  8.4*  8.2*     Recent Labs      06/02/17   0745   APTT  31.9   INR  1.05                  Assessment/Plan     COPD (chronic obstructive pulmonary disease) (CMS-HCC) (present on admission)  Assessment & Plan  Currently no wheezing  In RT , steroids    Hypertensive heart disease with heart failure (CMS-HCC) (present on admission)  Assessment & Plan  Follow, avoid fluid OL    Acute on chronic respiratory failure with hypoxia and hypercapnia (CMS-HCC) (present on admission)  Assessment & Plan  VDRF, pt extubated    HCAP (healthcare-associated pneumonia) (present on admission)  Assessment & Plan  Will Tx broad spectrum, await CX's    Sepsis (CMS-HCC) (present on admission)  Assessment & Plan  resolved    DM type 2, uncontrolled, with renal complications (CMS-HCC) (present on admission)  Assessment & Plan  Tight control ISS    CKD (chronic kidney disease), stage II (present on admission)  Assessment & Plan  Improved, follow, avoid nephrotoxins    Chronic pain syndrome (present on admission)  Assessment & Plan  Judicious Rx    Tobacco abuse disorder (present on admission)  Assessment & Plan  [facility]. Quit in 2/17    Anemia of chronic disease (present on admission)  Assessment & Plan  At baseline, iron def.    Morbid obesity (CMS-HCC) (present on admission)  Assessment & Plan  Chronic, complicating     Hypothyroidism (present on admission)  Assessment & Plan  On rx    EDITH (obstructive sleep apnea) (present on admission)  Assessment & Plan  h/o    Plan  C/w abx  follow s/p extubation  C/w fluids  TF's  SLP  BG control   am labs  Bp control  Pt with recent Echo and sign PAH at least moderate  See Orders  Pt is critically ill in  ICU  Time spent 35 min, no overlap  Core Measures

## 2017-06-03 NOTE — CARE PLAN
Problem: Skin Integrity  Goal: Risk for impaired skin integrity will decrease  Full skin assessment performed at beginning of shift, q2 hour turns, moisturizer and barrier wipes in use, pillows used to float heels

## 2017-06-03 NOTE — PROGRESS NOTES
Bedside report received from SHITAL Rios. Skin check and neuro assessment performed. Gtts verified. Patient nods yes to pain, medicated per MAR. Cooling blanket in room, but not in use. Pt's temp currently 99.1F. Bed in low and locked position.

## 2017-06-03 NOTE — CARE PLAN
Problem: Safety  Goal: Will remain free from injury  Outcome: PROGRESSING AS EXPECTED  Pt educated to use call light when requiring assistance. Call light within reach. Pt educated to not get up without staff assistance. Bed alarm in use. Bed in lowest position. Treaded slipped socks in use. Pt in room near nursing station.     Problem: Mechanical Ventilation  Goal: Safe management of artificial airway and ventilation  Outcome: PROGRESSING AS EXPECTED  Intervention: Suctioning and care of ET/Trach tube  Continuous pulse oximetry in use. Collaboration with RT. Head of bed elevated. chlorahexadine oral solution administered. Pt suctioned as needed. Oral care provided Q 2 hours. Peptic ulcer prophylaxis in use. Sequentials in use. ambu bag at bedside.

## 2017-06-03 NOTE — PROGRESS NOTES
Pulmonary Critical Care Progress Note        Date of Service: 6/3/2017    Chief Complaint: Worsening shortness of breath and altered mental status    History of Present Illness:  Please notes that the patient was sedated and intubated prior to obtaining history and all history was obtained from old chart records as well as the ER history physician, Dr. Bae.  The patient is a 66-year-old female who was receiving rehabilitation at the [facility] after undergoing an ankle fracture several weeks ago, who developed urinary tract infection symptoms several days ago and was being treated for a UTI.  Apparently, she had fevers up to 102.  However, over the course of the evening, the patient started having worsening shortness of breath and altered mental status.  Due to worsening symptoms of shortness of breath as well as continued altered mental status, it was decided to send the patient to the emergency department.  Upon arrival to the emergency department, the patient continued to be unresponsive and the decision to intubate was made.  Once the patient was intubated and a central line was    placed.  The patient immediately had drops in her blood pressure into the 60s and 70s and based on her chest x-ray, they thought that perhaps she had healthcare-acquired pneumonia and was started on the sepsis protocol with 4 liters of normal saline boluses as well as vasopressor therapy.    ROS:  Unable to obtain as the patient is intubated and sedated    Interval Events:  24 hour interval history reviewed  Alert and oriented X4, writing notes. Prop 5   SR,  systolic, off Levo since 7:15 AM. CVP 12.    One event of SB to 48 with PVCs overnight.   BM PTA, Adequate UOP.   Bedrest. Tmax 99.7   SBT for 30 minutes with improved secretions from yesterday.    CXR shows patchy infiltrates to right side.       PFSH:  No change.      Physical Exam  General:  Wide awake. Communicates via writing board.    Neuro/Psych: Answering all questions appropriately, following all commands.   HEENT: PERRL.   CVS: Regular rate and rhythm.   Respiratory: Coarse breath sounds, right greater than left.   Abdomen/: NT/ND, bowel sounds present.   Extremities: moving all extremities appropriately. No edema.   Skin: warm and pink.       Respiratory:  [place] Vent Mode: APVCMV, Rate (breaths/min): 24, Vt Target (mL): 360, PEEP/CPAP: 8, FiO2: 50, Control VTE (exp VT): 336  Pulse Oximetry: 99 %  ImagingAvailable data reviewed   CXR reviewed with team    Recent Labs      06/02/17   0723   ISTATAPH  7.302*   ISTATAPCO2  55.7*   ISTATAPO2  136*   ISTATATCO2  29   [id number]  99   ISTATARTHCO3  27.5*   ISTATARTBE  1   ISTATTEMP  107.0 F   ISTATFIO2  80   ISTATSPEC  Arterial   ISTATAPHTC  7.236*   [id number]  168*       HemoDynamics:  Pulse: (!) 57, Heart Rate (Monitored): (!) 56  Blood Pressure : 120/74 mmHg, NIBP: 105/49 mmHg  CVP (mm Hg): 5 MM HG  Imaging: Available data reviewed     Recent Labs      06/02/17   1140  06/02/17   1630  06/02/17 2025   TROPONINI  0.04  0.03  0.02       Neuro:  GCS Total [place] Coma Score: 10  Imaging: Available data reviewed    Fluids:  Intake/Output       06/01/17 0700 - 06/02/17 0659 (Not Admitted) 06/02/17 0700 - 06/03/17 0659 06/03/17 0700 - 06/04/17 0659      2056-4758 3777-7756 Total 7962-2036 1092-0367 Total 9564-0028 9341-3563 Total       Intake    I.V.  --  -- --  2029.9  1598.4 3628.3  --  -- --    Vasopressin Volume -- -- -- 0 0 0 -- -- --    Propofol Volume -- -- -- 36.6 28.7 65.3 -- -- --    Norepinephrine Volume -- -- -- 355.8 219.7 575.5 -- -- --    IV Volume (< ns >) -- -- -- 937.5 1250 2187.5 -- -- --    IV Piggyback Volume (IV Piggyback) -- -- -- 700 100 800 -- -- --    Other  --  -- --  --  60 60  --  -- --    Medications (P.O./ Enteral Liquids) -- -- -- -- 60 60 -- -- --    Enteral  --  -- --  --  30 30  --  -- --    Free Water / Tube Flush -- -- -- -- 30 30 -- -- --    Total  Intake -- -- -- 9.9 1688.4 3718.3 -- -- --       Output    Urine  --  -- --  1325  830 2155  --  -- --    Indwelling Cathether -- -- -- 2700 981 4134 -- -- --    Total Output -- -- -- 0687 667 7218 -- -- --       Net I/O     -- -- -- 704.9 858.4 1563.3 -- -- --        Weight: 104 kg (229 lb 4.5 oz)  Recent Labs      17   0745   SODIUM  139   POTASSIUM  4.6   CHLORIDE  104   CO2  26   BUN  30*   CREATININE  1.45*   MAGNESIUM  1.6   CALCIUM  8.4*       GI/Nutrition:  Imaging: Available data reviewed  NPO     Liver Function  Recent Labs      17   0745   ALTSGPT  19   ASTSGOT  32   ALKPHOSPHAT  68   TBILIRUBIN  0.6   GLUCOSE  146*       Heme:  Recent Labs      17   0745   RBC  2.74*   HEMOGLOBIN  7.9*   HEMATOCRIT  26.3*   PLATELETCT  243   PROTHROMBTM  14.0   APTT  31.9   INR  1.05       Infectious Disease:  Temp  Av.4 °C (104.7 °F)  Min: 39.1 °C (102.3 °F)  Max: 41.7 °C (107 °F)  Monitored Temp  Av.4 °C (99.3 °F)  Min: 36.7 °C (98.1 °F)  Max: 39.4 °C (102.9 °F)  Micro: reviewed     Recent Labs      17   0745   WBC  13.3*   NEUTSPOLYS  83.90*   LYMPHOCYTES  6.20*   MONOCYTES  7.40   EOSINOPHILS  1.40   BASOPHILS  0.30   ASTSGOT  32   ALTSGPT  19   ALKPHOSPHAT  68   TBILIRUBIN  0.6     Current Facility-Administered Medications   Medication Dose Frequency Provider Last Rate Last Dose   • propofol (DIPRIVAN) infusion  0-80 mcg/kg/min Continuous Sierra Vela M.D. 3.1 mL/hr at 17 2100 5 mcg/kg/min at 17   • fentaNYL (SUBLIMAZE) injection 100 mcg  100 mcg Once Adam Bae M.D.   Stopped at 17 0715    And   • fentaNYL (SUBLIMAZE) injection 100 mcg  100 mcg Q HOUR PRN Adam Bae M.D.       • Respiratory Care per Protocol   Continuous RT Sierra Vela M.D.       • chlorhexidine (PERIDEX) 0.12 % solution 15 mL  15 mL BID Sierra Vela M.D.   15 mL at 173   • lidocaine (XYLOCAINE) 1%  injection  1-2 mL Q30 MIN PRN Sierra Vela M.D.        • MD ALERT...Adult ICU Electrolyte Replacement per Pharmacy Protocol   pharmacy to dose Sierra Vela M.D.       • norepinephrine (LEVOPHED) 8 mg in  mL Infusion  0.5-30 mcg/min Continuous Sierra Vela M.D. 5.6 mL/hr at 06/03/17 0201 3 mcg/min at 06/03/17 0201    And   • vasopressin (VASOSTRICT) 20 Units in  mL Infusion  0.03 Units/min Continuous Sierra Vela M.D.   Stopped at 06/02/17 0800   • hydrocortisone sodium succinate PF (SOLU-CORTEF) 100 MG injection 100 mg  100 mg Q8HRS Sierra Vela M.D.   100 mg at 06/02/17 2113   • heparin injection 5,000 Units  5,000 Units Q8HRS Sierra Vela M.D.   5,000 Units at 06/02/17 2113   • fentaNYL (SUBLIMAZE) injection 25 mcg  25 mcg Q HOUR PRN Sierra Vela M.D.   25 mcg at 06/03/17 0407    Or   • fentaNYL (SUBLIMAZE) injection 50 mcg  50 mcg Q HOUR PRN Sierra Vela M.D.        Or   • fentaNYL (SUBLIMAZE) injection 100 mcg  100 mcg Q HOUR PRN Sierra Vela M.D.       • NS infusion 3,135 mL  30 mL/kg Once PRN Sierra Vela M.D.       • NS (BOLUS) infusion 500 mL  500 mL Once PRN Sierra Vela M.D.       • piperacillin-tazobactam (ZOSYN) 4.5 g in  mL IVPB  4.5 g Q6HRS Sierra Vela M.D.   Stopped at 06/03/17 0046   • MD ALERT... vancomycin per pharmacy protocol   pharmacy to dose Sierra Vela M.D.       • duloxetine (CYMBALTA) capsule 60 mg  60 mg DAILY Angel Huynh D.OMelonie   Stopped at 06/02/17 1200   • gabapentin (NEURONTIN) capsule 300 mg  300 mg BID ROBY Loja.O.   300 mg at 06/02/17 2112   • insulin glargine (LANTUS) injection 20 Units  20 Units Nightly ROBY Loja.O.   20 Units at 06/02/17 2113   • levothyroxine (SYNTHROID) tablet 88 mcg  88 mcg AM ES Angel Huynh D.O.       • montelukast (SINGULAIR) tablet 10 mg  10 mg Q EVENING Angel Huynh D.O.   10 mg at 06/02/17 2112   • omeprazole 2 mg/mL in sodium bicarbonate (PRILOSEC) oral susp 40 mg  40 mg DAILY Angel Huynh D.O.    Stopped at 06/02/17 1200   • senna-docusate (PERICOLACE or SENOKOT S) 8.6-50 MG per tablet 2 Tab  2 Tab BID GHULAM LojaO.   2 Tab at 06/02/17 2112    And   • polyethylene glycol/lytes (MIRALAX) PACKET 1 Packet  1 Packet QDAY PRN GHULAM LojaOMelonie        And   • magnesium hydroxide (MILK OF MAGNESIA) suspension 30 mL  30 mL QDAY PRN GHULAM LojaOMelonie        And   • bisacodyl (DULCOLAX) suppository 10 mg  10 mg QDAY PRN GHULAM LojaO.       • NS infusion   Continuous Angel Huynh D.O. 125 mL/hr at 06/03/17 0300     • acetaminophen (TYLENOL) tablet 650 mg  650 mg Q6HRS PRN GHULAM LojaO.       • insulin lispro (HUMALOG) injection 1-6 Units  1-6 Units Q6HRS ROBY Loja.O.   3 Units at 06/03/17 0525   • glucose 4 g chewable tablet 16 g  16 g Q15 MIN PRN GHULAM LojaOMelonie        And   • dextrose 50% (D50W) injection 25 mL  25 mL Q15 MIN PRN GHULAM LojaO.       • ondansetron (ZOFRAN) syringe/vial injection 4 mg  4 mg Q4HRS PRN GHULAM LojaO.       • ondansetron (ZOFRAN ODT) dispertab 4 mg  4 mg Q4HRS PRN GHULAM LojaO.       • oxycodone immediate release (ROXICODONE) tablet 10 mg  10 mg Q3HRS PRN ROBY Loja.O.       • acetaminophen (TYLENOL) suppository 650 mg  650 mg Q6HRS PRN ROBY Loja.O.   650 mg at 06/02/17 1255   • vancomycin 1,600 mg in  mL IVPB  15 mg/kg Q24HR Asiya Villavicencio, DAVISD         Last reviewed on 6/2/2017 10:05 AM by Rhea Cui    Quality  Measures:  Labs reviewed, Medications reviewed, Radiology images reviewed and EKG reviewed  Mckeon catheter: Critically Ill - Requiring Accurate Measurement of Urinary Output  Central line in place: Need for access, Vasopressors, Sepsis and Shock    DVT Prophylaxis: Heparin  DVT prophylaxis - mechanical: SCDs  Ulcer prophylaxis: Yes  Antibiotics: Treating active infection/contamination beyond 24 hours perioperative coverage  Assessed for rehab: Patient was assess for and/or received  rehabilitation services during this hospitalization      Problems/Plan:  Acute hypoxic respiratory failure.   - cont full vent support   - cont RT/O2 protocols   - start SBTs-->good weaning parameters-->extubated   - encourage IS  Severe sepsis with septic shock, likely due to pulmonary source.   - s/p sepsis protocol   - lactates normalized after IVF   - vasopressors weaned   - cont broad spectum abx   - follow cultures   - cont solucortef  Healthcare-acquired pneumonia.   - follow cultures   - cont vanco/zosyn   - follow CXR  Acute on chronic kidney disease.   - improved with fluid resuscitation   - avoid nephrotoxins  Hyperglycemia.   - ISS  Moderate protein-calorie malnutrition.   - cont enteral feedings  Acute leukocytosis.   - following  Anemia.   - trending   - suspect due to chronic disease  Relative adrenal insufficiency.   - cortisol level < 10   - cont hydrocortisone  Elevated troponin level   - trend  History of hepatitis C.  History of type 2 diabetes.  History of oxygen dependent chronic obstructive pulmonary disease.  History of asthma.  History of stage III chronic kidney disease.  History of posttraumatic stress disorder.  History of systemic arterial hypertension.  History of recent left trimalleolar ankle fracture with ORIF.    Discussed patient condition and risk of morbidity and/or mortality with RN, RT, Pharmacy and Charge nurse / hot rounds.    The patient remains critically ill.  Critical care time = 35 minutes in directly providing and coordinating critical care and extensive data review.  No time overlap and excludes procedures.    Rayne CLAY (Alla), am scribing for, and in the presence of, Sierra Vela M.D.  Electronically signed by: Rayne Mayorga (Alla), 6/3/2017  Sierra CLAY M.D. personally performed the services described in this documentation, as scribed by Rayne Mayorga in my presence, and it is both accurate and complete.

## 2017-06-03 NOTE — CONSULTS
DATE OF SERVICE:  06/02/2017    DATE OF SERVICE:  06/02/2017    REFERRING PHYSICIAN:  Adam Bae MD    REASON FOR CONSULTATION:  Septic shock with acute hypoxic respiratory failure   requiring intubation and mechanical ventilation.    HISTORY OF PRESENT ILLNESS:  Please notes that the patient was sedated and   intubated prior to obtaining history and all history was obtained from old   chart records as well as the ER history physician, Dr. Bae.  The patient   is a 66-year-old female who was receiving rehabilitation at the [facility] after undergoing an ankle fracture several weeks ago, who   developed urinary tract infection symptoms several days ago and was being   treated for a UTI.  Apparently, she had fevers up to 102.  However, over the   course of the evening, the patient started having worsening shortness of   breath and altered mental status.  Due to worsening symptoms of shortness of   breath as well as continued altered mental status, it was decided to send the   patient to the emergency department.  Upon arrival to the emergency   department, the patient continued to be unresponsive and the decision to   intubate was made.  Once the patient was intubated and a central line was   placed.  The patient immediately had drops in her blood pressure into the 60s   and 70s and based on her chest x-ray, they thought that perhaps she had   healthcare-acquired pneumonia and was started on the sepsis protocol with 4   liters of normal saline boluses as well as vasopressor therapy.    REVIEW OF SYSTEMS:  Unobtainable as the patient is sedated on the ventilator.    PAST MEDICAL HISTORY:  In the chart shows:  1.  Recent history of a left trimalleolar ankle fracture, status post surgery   with a left open reduction and internal fixation on the 8th of May.  2.  Hepatitis C.  3.  Gastroesophageal reflux disease.  4.  Chronic obstructive pulmonary disease.  5.  History of asthma.  6.   Hypertension.  7.  Obstructive sleep apnea, but she does not wear CPAP.  8.  Type 2 diabetes.  9.  Neuropathy to both feet.  10.  Fibromyalgia.  11.  Post-traumatic stress disorder.  12.  Chronic kidney disease stage III.  13.  History of chronic atrial fibrillation.    PAST SURGICAL HISTORY:  Includes:  1.  Hysterectomy, left knee replacement, right carpal tunnel surgery.  2.  Breast biopsy.  3.  Lumpectomy on the left.  4.  Ankle open reduction and internal fixation of the left ankle on the 8th of   May.    SOCIAL HISTORY:  The patient is a former smoker having quit in February 2017;   however, unable to obtain how much she smoked for and for how long.    Apparently, no history of illegal drug use, marijuana use, or alcohol use.    FAMILY HISTORY:  Unobtainable as the patient is sedated on the ventilator.    MEDICATIONS:  At the rehabilitation center include:  1.  Amlodipine 10 mg daily, Symbicort 160-4.5 mcg per actuation two puffs   twice daily.  2.  Coreg 3.125 mg twice daily.  3.  Vitamin D 2000 units daily.  4.  Vitamin B12 1000 mcg daily.  5.  Flexeril 10 mg 3 times daily.  6.  Colace 100 mg twice daily.  7.  Cymbalta 50 mg daily.  8.  Lovenox; however, the patient has been refusing this for the past 2 weeks.  9.  Iron gluconate 324 mg twice daily.  10.  Diflucan 100 mg daily that was initiated on the 31st of May.  11.  Folic acid 800 mcg daily.  12.  Gabapentin 600 mg 3 times daily.  13.  Hydrocortisone 5 mg daily.  14.  Lantus 22 units every evening.  15.  Humalog per sliding scale before meals and at bedtime.  16.  Combivent inhaler 1 puff every 6 hours as needed.  17.  Synthroid 75 mcg daily.  18.  Singulair 10 mg daily.  19.  Multivitamin daily.  20.  Nicotine 7 mg 1 patch every 24 hours.  21.  Omeprazole 20 mg daily.  22.  Roxicodone 10 mg every 3 hours as needed for moderate pain.  23.  [place]-Colace 8.6/50 mg evening.  24.  Spiriva 18 mcg 1 puff daily.  25.  Trazodone 300 mg daily.    ALLERGIES:  PER  HER RECORDS, VITAMIN C, KEFLEX, CODEINE, LYRICA, AND SUDAFED.    PHYSICAL EXAMINATION:  VITAL SIGNS:  Blood pressures ranged from 50s to 100s, over 20s to 30s, heart   rate in the 70s-80s, respiratory rate of 35-38.  T-max of 107 degrees   Fahrenheit, oxygenation of 95-99% on 50% FiO2.  GENERAL:  The patient is sedated with propofol at the time of the evaluation.  HEENT:  Pupils are about 4-5 mm and reactive to light.  EOMI was not assessed.    Conjunctivae are pink.  Sclerae are anicteric.  Oropharynx has ET tube in   place.  No obvious lacerations poor dentition.  NECK:  Supple, No adenopathy or thyromegaly appreciated.  LUNGS:  Coarse bilaterally, right greater than left.  CARDIOVASCULAR:  Regular rate and rhythm without murmur, rub, or gallop.  ABDOMEN:  Soft, nontender, nondistended.  No masses appreciated.  Bowel sounds   are present.  EXTREMITIES:  2+ dorsal pedal pulses, 2+ radial pulses.  Delayed capillary   refill by 4 seconds.  No pedal edema.  We will move all 4 extremities   spontaneously.  NEUROLOGIC:  The patient is sedated and appears to withdraw to painful stimuli   in all 4 has symmetrical facial expressions.  No obvious focal deficits.    LABORATORY DATA:  White count of 13.3, hemoglobin of 7.9, hematocrit of 26.3,   platelets of 243.  Sodium of 139, potassium of 4.6, chloride of 107, serum   bicarbonate of 26, glucose of 146, BUN of 30, creatinine of 1.45, calcium of   8.4, AST 32, ALT 19, alkaline phosphatase is 68, total bilirubin of 0.6,   albumin of 2.8, total protein of 5.4.  Anion gap of 9.  INR of 1.05, PT of   14.0, PTT is 31.9, troponin of 0.02.  Magnesium 1.6.  Initial lactic acid was   2.0.  Urinalysis showed protein, otherwise negative, procalcitonin is elevated   at 0.39.  Cortisol level was at 7.2.  TSH of 1.22.  Initial arterial blood   gas shows a pH of 7.302, pCO2 of 55.7, pO2 of 136 on ventilator settings of   CMV, rate of 18, tidal volume of 360, PEEP of 8 and 50%  FiO2.    IMAGING:  Chest x-ray shows ET tube in the proper position; however, there is   diffuse infiltrates noted to the right side, left side that looks clear.    IMPRESSION:  1.  Acute hypoxic respiratory failure.  2.  Severe sepsis with septic shock, likely due to pulmonary source.  3.  Healthcare-acquired pneumonia.  4.  Acute on chronic kidney disease.  5.  Hyperglycemia.  6.  Moderate protein-calorie malnutrition.  7.  Acute leukocytosis.  8.  Anemia.  9.  Relative adrenal insufficiency.  10.  Elevated troponin normal.  11.  History of hepatitis C.  12.  History of type 2 diabetes.  13.  History of oxygen dependent chronic obstructive pulmonary disease.  14.  History of asthma.  15.  History of stage III chronic kidney disease.  16.  History of posttraumatic stress disorder.  17.  History of systemic arterial hypertension.  18.  History of recent left trimalleolar ankle fracture with ORIF.    PLAN:  The patient is critically ill at this time with what appears to be   severe sepsis with septic shock, likely from a pulmonary source.  We have   obtained respiratory cultures and send have those off as well as blood   cultures.  The patient has been started on broad spectrum antibiotics for   healthcare acquired pneumonia that includes Zosyn and vancomycin.  She has   also been started on the sepsis protocol and has received already 4 liters of   crystalloid therapy as well as been started on vasopressor to maintain mean   arterial pressures greater than 65 as well as monitoring central venous   pressures.  We will continue to monitor central venous pressures, mean   arterial pressures, as well as lactic acids and give her crystalloids as   needed.  In the meantime, we will continue mechanical ventilation and follow   ABGs as well as chest x-rays.  It is noted that the patient does have a   significantly lower cortisol level and will dose her with Solu-Cortef, in the   meantime for relative adrenal insufficiency.   We will also continue to monitor   and all blood sugars as well as electrolytes and a correct as needed.  The   patient has been started on DVT as well as peptic ulcer prophylaxis; however,   at some point the patient may need to undergo a CTA of the chest to rule out   pulmonary embolus as the patient had been refusing DVT prophylaxis while she   was in her skilled rehab facility.  However, for now, we will continue to   stabilize the patient over the next couple of days.    Thank you for allowing the pulmonary critical care team to participate in this   patient's care.  We will continue to follow along.    This case was discussed at length with Dr. Adam Bae as well as Dr. Angel Huynh, the admitting hospitalist, respiratory therapy, pharmacy, as well as   the ICU nursing staff.  This patient is critically ill at this time.    Critical care time is approximately 60 minutes in direct critical care as well   as extensive data review.  There was no overlap with other physicians and   this excludes any procedures.       ____________________________________     MD JOSIAS MINOR / DMITRY    DD:  06/02/2017 15:42:25  DT:  06/02/2017 19:24:11    D#:  [id number]  Job#:  [id number]

## 2017-06-03 NOTE — FLOWSHEET NOTE
06/03/17 0922   Events/Summary/Plan   Events/Summary/Plan pulled parameters pt placed back on support   General Vent Information   Pulse Oximetry 99 %   Heart Rate (Monitored) 64   Weaning Parameters   RR (bpm) 20   #FVC / Vital Capacity (liters)  1.4   NIF (cm H2O)  -53   Rapid Shallow Breathing Index (RR/VT) 62   Spontaneous VE 8.9   Spontaneous

## 2017-06-04 ENCOUNTER — APPOINTMENT (OUTPATIENT)
Dept: RADIOLOGY | Facility: MEDICAL CENTER | Age: 67
DRG: 871 | End: 2017-06-04
Attending: INTERNAL MEDICINE
Payer: MEDICARE

## 2017-06-04 LAB
ALBUMIN SERPL BCP-MCNC: 2.6 G/DL (ref 3.2–4.9)
ALBUMIN/GLOB SERPL: 0.9 G/DL
ALP SERPL-CCNC: 44 U/L (ref 30–99)
ALT SERPL-CCNC: 12 U/L (ref 2–50)
ANION GAP SERPL CALC-SCNC: 8 MMOL/L (ref 0–11.9)
AST SERPL-CCNC: 12 U/L (ref 12–45)
BACTERIA SPEC RESP CULT: NORMAL
BACTERIA UR CULT: NORMAL
BASOPHILS # BLD AUTO: 0.3 % (ref 0–1.8)
BASOPHILS # BLD: 0.03 K/UL (ref 0–0.12)
BILIRUB SERPL-MCNC: 0.6 MG/DL (ref 0.1–1.5)
BNP SERPL-MCNC: 423 PG/ML (ref 0–100)
BUN SERPL-MCNC: 26 MG/DL (ref 8–22)
CALCIUM SERPL-MCNC: 8.4 MG/DL (ref 8.5–10.5)
CHLORIDE SERPL-SCNC: 112 MMOL/L (ref 96–112)
CO2 SERPL-SCNC: 24 MMOL/L (ref 20–33)
CREAT SERPL-MCNC: 0.95 MG/DL (ref 0.5–1.4)
DEPRECATED D DIMER PPP IA-ACNC: 2272 NG/ML(D-DU)
EOSINOPHIL # BLD AUTO: 0.09 K/UL (ref 0–0.51)
EOSINOPHIL NFR BLD: 0.8 % (ref 0–6.9)
ERYTHROCYTE [DISTWIDTH] IN BLOOD BY AUTOMATED COUNT: 59.7 FL (ref 35.9–50)
GFR SERPL CREATININE-BSD FRML MDRD: 59 ML/MIN/1.73 M 2
GLOBULIN SER CALC-MCNC: 2.9 G/DL (ref 1.9–3.5)
GLUCOSE BLD-MCNC: 148 MG/DL (ref 65–99)
GLUCOSE BLD-MCNC: 153 MG/DL (ref 65–99)
GLUCOSE BLD-MCNC: 157 MG/DL (ref 65–99)
GLUCOSE BLD-MCNC: 161 MG/DL (ref 65–99)
GLUCOSE SERPL-MCNC: 165 MG/DL (ref 65–99)
GRAM STN SPEC: NORMAL
HCT VFR BLD AUTO: 24.1 % (ref 37–47)
HGB BLD-MCNC: 7.3 G/DL (ref 12–16)
IMM GRANULOCYTES # BLD AUTO: 0.07 K/UL (ref 0–0.11)
IMM GRANULOCYTES NFR BLD AUTO: 0.6 % (ref 0–0.9)
LYMPHOCYTES # BLD AUTO: 0.79 K/UL (ref 1–4.8)
LYMPHOCYTES NFR BLD: 6.9 % (ref 22–41)
MAGNESIUM SERPL-MCNC: 1.9 MG/DL (ref 1.5–2.5)
MCH RBC QN AUTO: 28.9 PG (ref 27–33)
MCHC RBC AUTO-ENTMCNC: 30.3 G/DL (ref 33.6–35)
MCV RBC AUTO: 95.3 FL (ref 81.4–97.8)
MONOCYTES # BLD AUTO: 0.44 K/UL (ref 0–0.85)
MONOCYTES NFR BLD AUTO: 3.9 % (ref 0–13.4)
NEUTROPHILS # BLD AUTO: 9.97 K/UL (ref 2–7.15)
NEUTROPHILS NFR BLD: 87.5 % (ref 44–72)
NRBC # BLD AUTO: 0 K/UL
NRBC BLD AUTO-RTO: 0 /100 WBC
PHOSPHATE SERPL-MCNC: 3.5 MG/DL (ref 2.5–4.5)
PLATELET # BLD AUTO: 173 K/UL (ref 164–446)
PMV BLD AUTO: 9.7 FL (ref 9–12.9)
POTASSIUM SERPL-SCNC: 3.7 MMOL/L (ref 3.6–5.5)
PROCALCITONIN SERPL-MCNC: 1.22 NG/ML
PROT SERPL-MCNC: 5.5 G/DL (ref 6–8.2)
RBC # BLD AUTO: 2.53 M/UL (ref 4.2–5.4)
SIGNIFICANT IND 70042: NORMAL
SIGNIFICANT IND 70042: NORMAL
SITE SITE: NORMAL
SITE SITE: NORMAL
SODIUM SERPL-SCNC: 144 MMOL/L (ref 135–145)
SOURCE SOURCE: NORMAL
SOURCE SOURCE: NORMAL
VANCOMYCIN TROUGH SERPL-MCNC: 19.3 UG/ML (ref 10–20)
WBC # BLD AUTO: 11.4 K/UL (ref 4.8–10.8)

## 2017-06-04 PROCEDURE — 83735 ASSAY OF MAGNESIUM: CPT

## 2017-06-04 PROCEDURE — 700105 HCHG RX REV CODE 258: Performed by: HOSPITALIST

## 2017-06-04 PROCEDURE — 71010 DX-CHEST-PORTABLE (1 VIEW): CPT

## 2017-06-04 PROCEDURE — 85379 FIBRIN DEGRADATION QUANT: CPT

## 2017-06-04 PROCEDURE — A9270 NON-COVERED ITEM OR SERVICE: HCPCS | Performed by: HOSPITALIST

## 2017-06-04 PROCEDURE — 99291 CRITICAL CARE FIRST HOUR: CPT | Performed by: INTERNAL MEDICINE

## 2017-06-04 PROCEDURE — 92610 EVALUATE SWALLOWING FUNCTION: CPT

## 2017-06-04 PROCEDURE — G8996 SWALLOW CURRENT STATUS: HCPCS | Mod: CI

## 2017-06-04 PROCEDURE — G8997 SWALLOW GOAL STATUS: HCPCS | Mod: CH

## 2017-06-04 PROCEDURE — 700102 HCHG RX REV CODE 250 W/ 637 OVERRIDE(OP)

## 2017-06-04 PROCEDURE — 700105 HCHG RX REV CODE 258

## 2017-06-04 PROCEDURE — 84100 ASSAY OF PHOSPHORUS: CPT

## 2017-06-04 PROCEDURE — 700102 HCHG RX REV CODE 250 W/ 637 OVERRIDE(OP): Performed by: HOSPITALIST

## 2017-06-04 PROCEDURE — 93970 EXTREMITY STUDY: CPT | Mod: 26 | Performed by: SURGERY

## 2017-06-04 PROCEDURE — 700111 HCHG RX REV CODE 636 W/ 250 OVERRIDE (IP)

## 2017-06-04 PROCEDURE — 80202 ASSAY OF VANCOMYCIN: CPT

## 2017-06-04 PROCEDURE — 700102 HCHG RX REV CODE 250 W/ 637 OVERRIDE(OP): Performed by: INTERNAL MEDICINE

## 2017-06-04 PROCEDURE — 85025 COMPLETE CBC W/AUTO DIFF WBC: CPT

## 2017-06-04 PROCEDURE — 83880 ASSAY OF NATRIURETIC PEPTIDE: CPT

## 2017-06-04 PROCEDURE — A9270 NON-COVERED ITEM OR SERVICE: HCPCS

## 2017-06-04 PROCEDURE — 99233 SBSQ HOSP IP/OBS HIGH 50: CPT | Performed by: HOSPITALIST

## 2017-06-04 PROCEDURE — 93970 EXTREMITY STUDY: CPT

## 2017-06-04 PROCEDURE — 700111 HCHG RX REV CODE 636 W/ 250 OVERRIDE (IP): Performed by: INTERNAL MEDICINE

## 2017-06-04 PROCEDURE — 80053 COMPREHEN METABOLIC PANEL: CPT

## 2017-06-04 PROCEDURE — 770022 HCHG ROOM/CARE - ICU (200)

## 2017-06-04 PROCEDURE — 700111 HCHG RX REV CODE 636 W/ 250 OVERRIDE (IP): Performed by: HOSPITALIST

## 2017-06-04 PROCEDURE — 84145 PROCALCITONIN (PCT): CPT

## 2017-06-04 PROCEDURE — 82962 GLUCOSE BLOOD TEST: CPT

## 2017-06-04 PROCEDURE — 700105 HCHG RX REV CODE 258: Performed by: INTERNAL MEDICINE

## 2017-06-04 PROCEDURE — A9270 NON-COVERED ITEM OR SERVICE: HCPCS | Performed by: INTERNAL MEDICINE

## 2017-06-04 RX ORDER — POTASSIUM CHLORIDE 29.8 MG/ML
40 INJECTION INTRAVENOUS ONCE
Status: COMPLETED | OUTPATIENT
Start: 2017-06-04 | End: 2017-06-04

## 2017-06-04 RX ORDER — POTASSIUM CHLORIDE 1.5 G/1.58G
20 POWDER, FOR SOLUTION ORAL 2 TIMES DAILY
Status: DISCONTINUED | OUTPATIENT
Start: 2017-06-04 | End: 2017-06-06

## 2017-06-04 RX ORDER — FUROSEMIDE 10 MG/ML
20 INJECTION INTRAMUSCULAR; INTRAVENOUS
Status: DISCONTINUED | OUTPATIENT
Start: 2017-06-04 | End: 2017-06-05

## 2017-06-04 RX ORDER — FUROSEMIDE 10 MG/ML
40 INJECTION INTRAMUSCULAR; INTRAVENOUS ONCE
Status: COMPLETED | OUTPATIENT
Start: 2017-06-04 | End: 2017-06-04

## 2017-06-04 RX ADMIN — OXYCODONE HYDROCHLORIDE 10 MG: 10 TABLET ORAL at 21:43

## 2017-06-04 RX ADMIN — PIPERACILLIN SODIUM AND TAZOBACTAM SODIUM 4.5 G: 4; .5 INJECTION, POWDER, FOR SOLUTION INTRAVENOUS at 18:56

## 2017-06-04 RX ADMIN — VANCOMYCIN HYDROCHLORIDE 1600 MG: 100 INJECTION, POWDER, LYOPHILIZED, FOR SOLUTION INTRAVENOUS at 06:29

## 2017-06-04 RX ADMIN — GABAPENTIN 300 MG: 300 CAPSULE ORAL at 20:30

## 2017-06-04 RX ADMIN — PIPERACILLIN SODIUM AND TAZOBACTAM SODIUM 4.5 G: 4; .5 INJECTION, POWDER, FOR SOLUTION INTRAVENOUS at 05:42

## 2017-06-04 RX ADMIN — OMEPRAZOLE 40 MG: 20 CAPSULE, DELAYED RELEASE ORAL at 08:22

## 2017-06-04 RX ADMIN — BUDESONIDE AND FORMOTEROL FUMARATE DIHYDRATE 2 PUFF: 160; 4.5 AEROSOL RESPIRATORY (INHALATION) at 20:32

## 2017-06-04 RX ADMIN — INSULIN GLARGINE 20 UNITS: 100 INJECTION, SOLUTION SUBCUTANEOUS at 20:36

## 2017-06-04 RX ADMIN — PIPERACILLIN SODIUM AND TAZOBACTAM SODIUM 4.5 G: 4; .5 INJECTION, POWDER, FOR SOLUTION INTRAVENOUS at 13:40

## 2017-06-04 RX ADMIN — HEPARIN SODIUM 5000 UNITS: 5000 INJECTION, SOLUTION INTRAVENOUS; SUBCUTANEOUS at 20:30

## 2017-06-04 RX ADMIN — SODIUM CHLORIDE: 9 INJECTION, SOLUTION INTRAVENOUS at 06:29

## 2017-06-04 RX ADMIN — MONTELUKAST SODIUM 10 MG: 10 TABLET, FILM COATED ORAL at 20:30

## 2017-06-04 RX ADMIN — SENNOSIDES AND DOCUSATE SODIUM 2 TABLET: 8.6; 5 TABLET ORAL at 08:22

## 2017-06-04 RX ADMIN — LEVOTHYROXINE SODIUM 75 MCG: 75 TABLET ORAL at 06:29

## 2017-06-04 RX ADMIN — TIOTROPIUM BROMIDE 1 CAPSULE: 18 CAPSULE ORAL; RESPIRATORY (INHALATION) at 08:23

## 2017-06-04 RX ADMIN — HYDROCORTISONE SODIUM SUCCINATE 50 MG: 100 INJECTION, POWDER, FOR SOLUTION INTRAMUSCULAR; INTRAVENOUS at 08:23

## 2017-06-04 RX ADMIN — INSULIN LISPRO 3 UNITS: 100 INJECTION, SOLUTION INTRAVENOUS; SUBCUTANEOUS at 20:35

## 2017-06-04 RX ADMIN — FUROSEMIDE 20 MG: 10 INJECTION, SOLUTION INTRAVENOUS at 18:20

## 2017-06-04 RX ADMIN — OXYCODONE HYDROCHLORIDE 10 MG: 10 TABLET ORAL at 09:01

## 2017-06-04 RX ADMIN — DULOXETINE HYDROCHLORIDE 60 MG: 60 CAPSULE, DELAYED RELEASE ORAL at 08:22

## 2017-06-04 RX ADMIN — GABAPENTIN 300 MG: 300 CAPSULE ORAL at 08:23

## 2017-06-04 RX ADMIN — OXYCODONE HYDROCHLORIDE 10 MG: 10 TABLET ORAL at 18:38

## 2017-06-04 RX ADMIN — FUROSEMIDE 40 MG: 10 INJECTION, SOLUTION INTRAMUSCULAR; INTRAVENOUS at 08:56

## 2017-06-04 RX ADMIN — OXYCODONE HYDROCHLORIDE 10 MG: 10 TABLET ORAL at 13:32

## 2017-06-04 RX ADMIN — INSULIN LISPRO 3 UNITS: 100 INJECTION, SOLUTION INTRAVENOUS; SUBCUTANEOUS at 05:43

## 2017-06-04 RX ADMIN — BUDESONIDE AND FORMOTEROL FUMARATE DIHYDRATE 2 PUFF: 160; 4.5 AEROSOL RESPIRATORY (INHALATION) at 08:34

## 2017-06-04 RX ADMIN — ALBUTEROL SULFATE 2 PUFF: 90 AEROSOL, METERED RESPIRATORY (INHALATION) at 12:34

## 2017-06-04 RX ADMIN — POTASSIUM CHLORIDE 40 MEQ: 29.8 INJECTION, SOLUTION INTRAVENOUS at 08:56

## 2017-06-04 RX ADMIN — INSULIN LISPRO 3 UNITS: 100 INJECTION, SOLUTION INTRAVENOUS; SUBCUTANEOUS at 13:40

## 2017-06-04 RX ADMIN — POTASSIUM CHLORIDE 20 MEQ: 1.5 POWDER, FOR SOLUTION ORAL at 20:30

## 2017-06-04 ASSESSMENT — PAIN SCALES - GENERAL
PAINLEVEL_OUTOF10: 5
PAINLEVEL_OUTOF10: 0
PAINLEVEL_OUTOF10: 0
PAINLEVEL_OUTOF10: 7
PAINLEVEL_OUTOF10: 0
PAINLEVEL_OUTOF10: 7
PAINLEVEL_OUTOF10: 4
PAINLEVEL_OUTOF10: 6
PAINLEVEL_OUTOF10: 6
PAINLEVEL_OUTOF10: 3
PAINLEVEL_OUTOF10: 3
PAINLEVEL_OUTOF10: 0
PAINLEVEL_OUTOF10: 4
PAINLEVEL_OUTOF10: 3

## 2017-06-04 ASSESSMENT — ENCOUNTER SYMPTOMS
GASTROINTESTINAL NEGATIVE: 1
WEAKNESS: 1
BACK PAIN: 1
COUGH: 1
PSYCHIATRIC NEGATIVE: 1
EYES NEGATIVE: 1
FEVER: 0
HEADACHES: 0
SPUTUM PRODUCTION: 1
CHILLS: 0
SHORTNESS OF BREATH: 1

## 2017-06-04 ASSESSMENT — LIFESTYLE VARIABLES: DO YOU DRINK ALCOHOL: NO

## 2017-06-04 NOTE — PROGRESS NOTES
Pt brought to [facility] via wheelchair, assisted back to bed TTWB on left foot with boot on. Pt very SOB with transfer and breath holding,  sats dropped to 83% recovered well with slow deep breathing, pt on 6liters oxymask. Pt only pulled 700 on IS. Tube feeds restarted. Call light within reach, pt oriented to unit.

## 2017-06-04 NOTE — THERAPY
"Speech Language Therapy Clinical Swallow Evaluation completed.  Functional Status: The patient presents with normal oral motor examination, laryngeal elevation and timeliness of swallow. Her vocal quality is hoarse but audible and never wet or gurgly. She consumed PO without overt signs of aspiration. Her SpO2 would dip 2 to 3% when taking consecutive sips of thins. Patient has COPD and GERD. Patient instructed in swallow precautions related to these dx. Recommend Dysphagia 3, thin liquid, no rice. Patient will need to go slow and take breaks during meals due to her respiratory status. If her respiratory status declines she will be at risk for dysphagia/aspiration. Please hold PO with any difficulties. SLP will continue to follow.   Recommendations - Diet: Diet / Liquid Recommendation: Dysphagia III, Thin Liquid (no rice)                          Strategies: Monitor during meals and Head of Bed at 90 Degrees                          Medication Administration: Medication Administration : Whole with Liquid Wash  Plan of Care: Will benefit from Speech Therapy 3 times per week  Post-Acute Therapy: Discharge to a transitional care facility for continued skilled therapy services due to left lower extremity (please defer to Pt/OT notes)    See \"Rehab Therapy-Acute\" Patient Summary Report for complete documentation.   "

## 2017-06-04 NOTE — PROGRESS NOTES
"Pharmacy Kinetics 66 y.o. female on vancomycin day # 3 6/4/2017    Currently on Vancomycin 1600 mg iv q24hr    Indication for Treatment: empiric HCAP    Pertinent history per medical record: Admitted on 6/2/2017 forsepsis d/t suspected pneumonia. Patient transferred from [facility] for recovery from a L ankle fracture. Patient currently being treated with fluconazole for funguria, noted to have fever and hypoxia at [facility] and transferred to [facility] for further care. Patient required intubation in ED, imaging revealed bilateral pneumonia, thus empiric antibiotics initiated for possible HCAP. Started on pressor support and hydrocortisone.    Other antibiotics: Zosyn 4.5 g IV q6h    Allergies: Vitamin c; Keflex; Codeine; Lyrica; and Sudafed     List concerns for renal function: age, CKD stage III, T2DM, low albumin, obesity (BMI = 39)    Pertinent cultures to date:   6/2: TA - rare GPCs, GPRs  6/2: peripheral blood cx X2 - NGTD  6/2: urine cx - NGTD  6/2: influenza by PCR - negative    Recent Labs      06/02/17   0745  06/03/17   0520  06/04/17   0535   WBC  13.3*  17.0*  11.4*   NEUTSPOLYS  83.90*  93.10*  87.50*     Recent Labs      06/02/17   0745  06/03/17   0520  06/04/17   0535   BUN  30*  24*  26*   CREATININE  1.45*  1.25  0.95   ALBUMIN  2.8*   --   2.6*     Recent Labs      06/04/17   0535   VANCOTROUGH  19.3     Intake/Output Summary (Last 24 hours) at 06/04/17 1225  Last data filed at 06/04/17 1000   Gross per 24 hour   Intake   3688 ml   Output   2275 ml   Net   1413 ml      Blood pressure 120/74, pulse 98, temperature 39.1 °C (102.3 °F), resp. rate 26, height 1.626 m (5' 4\"), weight 104.4 kg (230 lb 2.6 oz), SpO2 93 %. No data recorded.      A/P   1. Vancomycin dose change: decrease dose to 1300 mg q24h  2. Next vancomycin level: ~ 2 days  3. Goal trough: 16-20 mcg/mL  4. Comments: Trough drawn prior to 3rd dose at 19.3 mcg/mL. Patient not yet at steady state and anticipate trough will continue to " rise. Renal indices continue to improve with adequate uop. Off pressors for past 24 hours. Decrease dose to 1300 mg and check trough in ~ 2 days to evaluate antibiotic clearance to make sure trough remains in goal range. Follow culture results.    Juliocesar Ramon, PharmD

## 2017-06-04 NOTE — CARE PLAN
Problem: Respiratory:  Goal: Respiratory status will improve  Outcome: PROGRESSING AS EXPECTED  Pt on baseline/home amount of O2. Educated on CDB and IS usage.    Problem: Fluid Volume:  Goal: Will maintain balanced intake and output  Outcome: PROGRESSING AS EXPECTED  Urine output and CVP monitoring in place. IVF and tube feed intake being recorded q2hr

## 2017-06-04 NOTE — PROGRESS NOTES
Pulmonary Critical Care Progress Note        Date of Service: 6/4/2017    Chief Complaint: Worsening shortness of breath and altered mental status    History of Present Illness:  Please notes that the patient was sedated and intubated prior to obtaining history and all history was obtained from old chart records as well as the ER history physician, Dr. Bae.  The patient is a 66-year-old female who was receiving rehabilitation at the [facility] after undergoing an ankle fracture several weeks ago, who developed urinary tract infection symptoms several days ago and was being treated for a UTI.  Apparently, she had fevers up to 102.  However, over the course of the evening, the patient started having worsening shortness of breath and altered mental status.  Due to worsening symptoms of shortness of breath as well as continued altered mental status, it was decided to send the patient to the emergency department.  Upon arrival to the emergency department, the patient continued to be unresponsive and the decision to intubate was made.  Once the patient was intubated and a central line was    placed.  The patient immediately had drops in her blood pressure into the 60s and 70s and based on her chest x-ray, they thought that perhaps she had healthcare-acquired pneumonia and was started on the sepsis protocol with 4 liters of normal saline boluses as well as vasopressor therapy.    ROS:  Unable to obtain as the patient is intubated and sedated    Interval Events:  24 hour interval history reviewed     Stable overnight.  This morning she had an episode of increased SOB while ambulating to the restroom, oxygen saturations in the 70s.  She was placed on 8L oxy mask which raised her levels though has been intermittently desaturating down to the 80s.  Numbness and tingling bilaterally in LEs, hx of same.  SR 60-80s.  BPs 1teens-140s systolic.  Right nare cortrak running at 85.  Swallowing ice chips,  though no official swallow study has been performed.  Mckeon catheter placed with 750 out overnight.  CXR shows diffuse fluffy opacities right greater than left, Increased pulmonary edema bilaterally.  Refused Lovenox and heparin this morning.  Refused IS overnight.      Plan: Doppler her legs for further evaluation and trial of lasix      Yesterday's Events:  Alert and oriented X4, writing notes. Prop 5   SR,  systolic, off Levo since 7:15 AM. CVP 12.    One event of SB to 48 with PVCs overnight.   BM PTA, Adequate UOP.   Bedrest. Tmax 99.7   SBT for 30 minutes with improved secretions from yesterday.    CXR shows patchy infiltrates to right side.       PFSH:  No change.      Physical Exam  General:  Wide awake. Communicates via writing board.   Neuro/Psych: Answering all questions appropriately, following all commands.   HEENT: PERRL.   CVS: Regular rate and rhythm.   Respiratory: Coarse breath sounds, right greater than left.   Abdomen/: NT/ND, bowel sounds present.   Extremities: moving all extremities appropriately. No edema.   Skin: warm and pink.       Respiratory:     Pulse Oximetry: 93 %  ImagingAvailable data reviewed   CXR reviewed with team    Recent Labs      06/02/17   0723  06/03/17   0549   ISTATAPH  7.302*  7.377*   ISTATAPCO2  55.7*  41.4*   ISTATAPO2  136*  91*   ISTATATCO2  29  26   [id number]  99  97   ISTATARTHCO3  27.5*  24.3   ISTATARTBE  1  -1   ISTATTEMP  107.0 F  97.7 F   ISTATFIO2  80  50   ISTATSPEC  Arterial  Arterial   ISTATAPHTC  7.236*  7.384*   [id number]  168*  88*       HemoDynamics:  Pulse: 98, Heart Rate (Monitored): 98  NIBP: (!) 169/70 mmHg  CVP (mm Hg): (!) 12 MM HG  Imaging: Available data reviewed     Recent Labs      06/02/17   1140  06/02/17   1630  06/02/17 2025 06/04/17   0535   TROPONINI  0.04  0.03  0.02   --    BNPBTYPENAT   --    --    --   423*       Neuro:  GCS Total Jackson Coma Score: 15  Imaging: Available data  reviewed    Fluids:  Intake/Output       06/02/17 0700 - 06/03/17 0659 06/03/17 0700 - 06/04/17 0659 06/04/17 0700 - 06/05/17 0659      0700-1859 1900-0659 Total 0700-1859 1900-0659 Total 0700-1859 1900-0659 Total       Intake    I.V.  2029.9  2215.8 4245.7  1507.3  1346 2853.3  432  -- 432    Vasopressin Volume 0 0 0 0 -- 0 -- -- --    Propofol Volume 36.6 34.9 71.5 10.3 -- 10.3 -- -- --    Norepinephrine Volume 355.8 230.9 586.7 7 -- 7 -- -- --    IV Volume (< ns >) 937.5 1500 2437.5 9937 253 3529 332 -- 332    IV Piggyback Volume (IV Piggyback)  200 350 550 100 -- 100    Other  --  80 80  140  90 230  100  -- 100    Medications (P.O./ Enteral Liquids) -- 80 80 140 90 230 100 -- 100    Enteral  --  60 60  391  640 1031  200  -- 200    Enteral Volume -- -- -- 225 550 775 200 -- 200    Free Water / Tube Flush -- 60 60 166 90 256 -- -- --    Total Intake 2029.9 2355.8 4385.7 2038.3 2076 4114.3 732 -- 732       Output    Urine  1325  920 2245  850  800 1650  1075  -- 1075    Indwelling Cathether 2694 230 4258  1075 -- 1075    Drains  --  -- --  --  -- --  0  -- 0    Residual Amount (ml) (Discarded) -- -- -- -- -- -- 0 -- 0    Stool  --  -- --  --  -- --  --  -- --    Number of Times Stooled -- -- -- 2 x -- 2 x 1 x -- 1 x    Total Output 6186 849 9495  1075 -- 1075       Net I/O     704.9 1435.8 2140.7 1188.3 1276 2464.3 -343 -- -343        Weight: 104.4 kg (230 lb 2.6 oz)  Recent Labs      06/02/17   0745  06/03/17   0520  06/04/17   0535   SODIUM  139  140  144   POTASSIUM  4.6  4.0  3.7   CHLORIDE  104  109  112   CO2  26  25  24   BUN  30*  24*  26*   CREATININE  1.45*  1.25  0.95   MAGNESIUM  1.6  1.6  1.9   PHOSPHORUS   --   3.4  3.5   CALCIUM  8.4*  8.2*  8.4*       GI/Nutrition:  Imaging: Available data reviewed  NPO     Liver Function  Recent Labs      06/02/17   0745  06/03/17   0520  06/04/17   0535   ALTSGPT  19   --   12   ASTSGOT  32   --   12   ALKPHOSPHAT  68   --    44   TBILIRUBIN  0.6   --   0.6   GLUCOSE  146*  242*  165*       Heme:  Recent Labs      17   0745  17   0520  17   0535   RBC  2.74*  2.64*  2.53*   HEMOGLOBIN  7.9*  7.6*  7.3*   HEMATOCRIT  26.3*  24.9*  24.1*   PLATELETCT  243  189  173   PROTHROMBTM  14.0   --    --    APTT  31.9   --    --    INR  1.05   --    --        Infectious Disease:  Monitored Temp  Av.8 °C (98.2 °F)  Min: 36.4 °C (97.5 °F)  Max: 36.9 °C (98.4 °F)  Micro: reviewed     Recent Labs      17   0745  17   0517   0535   WBC  13.3*  17.0*  11.4*   NEUTSPOLYS  83.90*  93.10*  87.50*   LYMPHOCYTES  6.20*  2.70*  6.90*   MONOCYTES  7.40  3.20  3.90   EOSINOPHILS  1.40  0.00  0.80   BASOPHILS  0.30  0.20  0.30   ASTSGOT  32   --   12   ALTSGPT  19   --   12   ALKPHOSPHAT  68   --   44   TBILIRUBIN  0.6   --   0.6     Current Facility-Administered Medications   Medication Dose Frequency Provider Last Rate Last Dose   • [START ON 2017] vancomycin 1,300 mg in  mL IVPB  1,300 mg Q24HR Juliocesar Ramon, PHARMD       • potassium chloride in water (KCL) ivpb **Administer in ICU only** 40 mEq  40 mEq Once Larry Cordero M.D. 25 mL/hr at 17 0856 40 mEq at 17 0856   • insulin lispro (HUMALOG) injection 3-14 Units  3-14 Units Q6HRS Sierra Vela M.D.   3 Units at 17 0543   • levothyroxine (SYNTHROID) tablet 75 mcg  75 mcg AM ES Juliocesar Ramon, PHARMD   75 mcg at 17 0629   • albuterol inhaler 2 Puff  2 Puff Q4HRS PRN Sierra Vela M.D.       • budesonide-formoterol (SYMBICORT) 160-4.5 MCG/ACT inhaler 2 Puff  2 Puff BID Sierra Vela M.D.   2 Puff at 17 0834   • tiotropium (SPIRIVA) 18 MCG inhalation capsule 1 Cap  1 Cap DAILY Sierra Vela M.D.   1 Cap at 17 0823   • Respiratory Care per Protocol   Continuous RT Sierra Vela M.D.       • chlorhexidine (PERIDEX) 0.12 % solution 15 mL  15 mL BID Sierra Vela M.D.   Stopped at 17 0900    • MD ALERT...Adult ICU Electrolyte Replacement per Pharmacy Protocol   pharmacy to dose Sierra Vela M.D.       • heparin injection 5,000 Units  5,000 Units Q8HRS Sierra Vela M.D.   5,000 Units at 06/03/17 0541   • fentaNYL (SUBLIMAZE) injection 25 mcg  25 mcg Q HOUR PRN Sierra Vela M.D.   25 mcg at 06/03/17 0734    Or   • fentaNYL (SUBLIMAZE) injection 50 mcg  50 mcg Q HOUR PRN Sierra Vela M.D.        Or   • fentaNYL (SUBLIMAZE) injection 100 mcg  100 mcg Q HOUR PRN Sierra Vela M.D.       • NS infusion 3,135 mL  30 mL/kg Once PRN Sierra Vela M.D.       • NS (BOLUS) infusion 500 mL  500 mL Once PRN Sierra Vela M.D.       • piperacillin-tazobactam (ZOSYN) 4.5 g in  mL IVPB  4.5 g Q6HRS Sierra Vela M.D.   Stopped at 06/04/17 0642   • MD ALERT... vancomycin per pharmacy protocol   pharmacy to dose Sierra Vela M.D.       • duloxetine (CYMBALTA) capsule 60 mg  60 mg DAILY ROBY Loja.O.   60 mg at 06/04/17 0822   • gabapentin (NEURONTIN) capsule 300 mg  300 mg BID ROBY Loja.O.   300 mg at 06/04/17 0823   • insulin glargine (LANTUS) injection 20 Units  20 Units Nightly ROBY Loja.O.   20 Units at 06/03/17 1957   • montelukast (SINGULAIR) tablet 10 mg  10 mg Q EVENING ROBY Loja.O.   10 mg at 06/03/17 1956   • omeprazole 2 mg/mL in sodium bicarbonate (PRILOSEC) oral susp 40 mg  40 mg DAILY ROBY Loja.O.   40 mg at 06/04/17 0822   • senna-docusate (PERICOLACE or SENOKOT S) 8.6-50 MG per tablet 2 Tab  2 Tab BID GHULAM LojaO.   2 Tab at 06/04/17 0822    And   • polyethylene glycol/lytes (MIRALAX) PACKET 1 Packet  1 Packet QDAY PRN Angel Huynh D.O.        And   • magnesium hydroxide (MILK OF MAGNESIA) suspension 30 mL  30 mL QDAY PRN Angel Huynh D.O.        And   • bisacodyl (DULCOLAX) suppository 10 mg  10 mg QDAY PRN GHULAM LojaO.       • acetaminophen (TYLENOL) tablet 650 mg  650 mg Q6HRS PRN GHULAM LojaO.        • glucose 4 g chewable tablet 16 g  16 g Q15 MIN PRN Angel Huynh D.O.        And   • dextrose 50% (D50W) injection 25 mL  25 mL Q15 MIN PRN Angel Huynh D.O.       • ondansetron (ZOFRAN) syringe/vial injection 4 mg  4 mg Q4HRS PRN GHULAM LojaO.       • ondansetron (ZOFRAN ODT) dispertab 4 mg  4 mg Q4HRS PRN GHULAM LojaO.       • oxycodone immediate release (ROXICODONE) tablet 10 mg  10 mg Q3HRS PRN ROBY Loja.O.   10 mg at 06/04/17 0901   • acetaminophen (TYLENOL) suppository 650 mg  650 mg Q6HRS PRN ROBY Loja.O.   650 mg at 06/02/17 1255     Last reviewed on 6/2/2017 10:05 AM by Alicia Armas [facility]    Quality  Measures:  Labs reviewed, Medications reviewed, Radiology images reviewed and EKG reviewed  Mckeon catheter: Critically Ill - Requiring Accurate Measurement of Urinary Output  Central line in place: Need for access, Vasopressors, Sepsis and Shock    DVT Prophylaxis: Heparin  DVT prophylaxis - mechanical: SCDs  Ulcer prophylaxis: Yes  Antibiotics: Treating active infection/contamination beyond 24 hours perioperative coverage  Assessed for rehab: Patient was assess for and/or received rehabilitation services during this hospitalization      Problems/Plan:  Acute hypoxic respiratory failure.   - extubated on 6/3   - cont RT/O2 protocols   - after episode of marked desaturation will keep in ICU    - trial of lasix for forced diuresis    - check US of lower extremities    - encourage IS  Severe sepsis with septic shock, likely due to pulmonary source.   - s/p sepsis protocol   - lactates normalized after IVF   - vasopressors weaned   - cont broad spectum abx   - follow cultures   - cont solucortef  Healthcare-acquired pneumonia.   - follow cultures   - cont vanco/zosyn   - follow CXR  Acute on chronic kidney disease.   - improved with fluid resuscitation   - avoid nephrotoxins  Hyperglycemia.   - ISS  Moderate protein-calorie malnutrition.   - cont enteral feedings  Acute  leukocytosis.   - improving  Anemia.   - trending   - suspect due to chronic disease  Relative adrenal insufficiency.   - cortisol level < 10   - cont hydrocortisone  Elevated troponin level   - trend  History of hepatitis C.  History of type 2 diabetes.  History of oxygen dependent chronic obstructive pulmonary disease.  History of asthma.  History of stage III chronic kidney disease.  History of posttraumatic stress disorder.  History of systemic arterial hypertension.  History of pulmonary hypertension likely related to COPD  History of recent left trimalleolar ankle fracture with ORIF.    Discussed patient condition and risk of morbidity and/or mortality with RN, RT, Pharmacy and Charge nurse / hot rounds.    The patient remains critically ill.  Critical care time = 32 minutes in directly providing and coordinating critical care and extensive data review.  No time overlap and excludes procedures.     Abdias CLAY (Taze), am scribing for, and in the presence of, Sierra Vela M.D.  Electronically signed by: Abdias Ramirez (Alla), 6/4/2017  Sierra CLAY M.D. personally performed the services described in this documentation, as scribed by Abdias Ramirez in my presence, and it is both accurate and complete.

## 2017-06-04 NOTE — PROGRESS NOTES
[facility] Hospitalist Progress Note    Date of Service: 2017    Chief Complaint  66 y.o. female admitted 2017 with resp. Failure, admitted from SNF, Pt with recent hospitalization, ankle Fx, UTI, chr. Obesity, COPD and h/o tobacco abuse    Interval Problem Update  Patient seen and examined today. ICU Care  Care and plan discussed in IDT/Hot rounds.  Lines and assistive devices reviewed.    Patient tolerating treatment and therapies.  All Data, Medication data reviewed.  Case discussed with nursing as available.  Plan of Care reviewed with patient and notified of changes.  6/3 Pt off pressors this am, awake and writing notes, for SBT's and poss. Extubation  H/o CKD, labs better this am,Pt only remembers N/V prior to the event   Pt improved, with activity sign SOB and desaturation noted, no SLP done yet, core trak, wants to eat, is accepting DVT prophy now, ? Of VTE event, CXR looks wet    Consultants/Specialty  Pulm,CC    Disposition  ICU, post extubation        Review of Systems   Constitutional: Positive for malaise/fatigue. Negative for fever and chills.   HENT: Negative.    Eyes: Negative.    Respiratory: Positive for cough, sputum production and shortness of breath.    Cardiovascular: Positive for leg swelling. Negative for chest pain.   Gastrointestinal: Negative.    Genitourinary: Negative.    Musculoskeletal: Positive for back pain and joint pain.   Skin: Positive for rash.   Neurological: Positive for weakness. Negative for headaches.   Endo/Heme/Allergies: Negative.    Psychiatric/Behavioral: Negative.    All other systems reviewed and are negative.     Physical Exam  Laboratory/Imaging   Hemodynamics  Temp (24hrs), Av.3 °C (99.1 °F), Min:37.3 °C (99.1 °F), Max:37.3 °C (99.1 °F)   Temperature: 37.3 °C (99.1 °F), Monitored Temp: 36.9 °C (98.4 °F)  Pulse  Av.6  Min: 49  Max: 98 Heart Rate (Monitored): 64  NIBP: 159/71 mmHg CVP (mm Hg): (!) 12 MM HG    Respiratory      Respiration: (!) 24,  Pulse Oximetry: 96 %, O2 Daily Delivery Respiratory : Silicone Nasal Cannula     #MDI/DPI Given: MDI/DPI x 1, Work Of Breathing / Effort: Mild  RUL Breath Sounds: Expiratory Wheezes, RML Breath Sounds: Expiratory Wheezes, RLL Breath Sounds: Diminished, BIRD Breath Sounds: Expiratory Wheezes, LLL Breath Sounds: Diminished    Fluids    Intake/Output Summary (Last 24 hours) at 06/04/17 1614  Last data filed at 06/04/17 1200   Gross per 24 hour   Intake   3224 ml   Output   3100 ml   Net    124 ml       Nutrition  Orders Placed This Encounter   Procedures   • DIET ORDER     Standing Status: Standing      Number of Occurrences: 1      Standing Expiration Date:      Order Specific Question:  Diet:     Answer:  Diabetic [3]     Order Specific Question:  Texture/Fiber modifications:     Answer:  Dysphagia 3(Mechanical Soft)specify fluid consistency(question 6) [3]      Comments:  no rice     Order Specific Question:  Consistency/Fluid modifications:     Answer:  Thin Liquids [3]     Physical Exam   Constitutional: She is oriented to person, place, and time. She appears well-developed and well-nourished.   HENT:   Head: Normocephalic and atraumatic.   Nose: Nose normal.   Mouth/Throat: Oropharynx is clear and moist.   Eyes: Conjunctivae and EOM are normal. Pupils are equal, round, and reactive to light.   Neck: Normal range of motion. Neck supple. No JVD present. No thyromegaly present.   Cardiovascular: Normal rate, regular rhythm and normal heart sounds.  Exam reveals no gallop and no friction rub.    Pulmonary/Chest: Effort normal. She has decreased breath sounds. She has no wheezes. She has rhonchi. She has no rales.   Abdominal: Soft. Bowel sounds are normal. She exhibits no distension and no mass. There is no tenderness. There is no rebound and no guarding.   Musculoskeletal: Normal range of motion. She exhibits no edema or tenderness.   Lymphadenopathy:     She has no cervical adenopathy.   Neurological: She is alert  and oriented to person, place, and time. No cranial nerve deficit.   Skin: Skin is warm and dry. She is not diaphoretic.   echymosis  LE ankle incision healing  Sutures removed   Psychiatric: She has a normal mood and affect. Her behavior is normal.   Nursing note and vitals reviewed.      Recent Labs      06/02/17   0745  06/03/17   0520  06/04/17   0535   WBC  13.3*  17.0*  11.4*   RBC  2.74*  2.64*  2.53*   HEMOGLOBIN  7.9*  7.6*  7.3*   HEMATOCRIT  26.3*  24.9*  24.1*   MCV  96.0  94.3  95.3   MCH  28.8  28.8  28.9   MCHC  30.0*  30.5*  30.3*   RDW  59.8*  59.5*  59.7*   PLATELETCT  243  189  173   MPV  9.1  9.1  9.7     Recent Labs      06/02/17   0745  06/03/17   0520  06/04/17   0535   SODIUM  139  140  144   POTASSIUM  4.6  4.0  3.7   CHLORIDE  104  109  112   CO2  26  25  24   GLUCOSE  146*  242*  165*   BUN  30*  24*  26*   CREATININE  1.45*  1.25  0.95   CALCIUM  8.4*  8.2*  8.4*     Recent Labs      06/02/17   0745   APTT  31.9   INR  1.05     Recent Labs      06/04/17   0535   BNPBTYPENAT  423*              Assessment/Plan     COPD (chronic obstructive pulmonary disease) (CMS-HCC) (present on admission)  Assessment & Plan  Currently no wheezing  In RT , steroids    Hypertensive heart disease with heart failure (CMS-HCC) (present on admission)  Assessment & Plan  Follow, avoid fluid OL    Acute on chronic respiratory failure with hypoxia and hypercapnia (CMS-HCC) (present on admission)  Assessment & Plan  VDRF, pt extubated    HCAP (healthcare-associated pneumonia) (present on admission)  Assessment & Plan  Will Tx broad spectrum, await CX's    Sepsis (CMS-HCC) (present on admission)  Assessment & Plan  resolved    DM type 2, uncontrolled, with renal complications (CMS-HCC) (present on admission)  Assessment & Plan  Tight control ISS    CKD (chronic kidney disease), stage II (present on admission)  Assessment & Plan  Improved, follow, avoid nephrotoxins    Chronic pain syndrome (present on  admission)  Assessment & Plan  Judicious Rx    Tobacco abuse disorder (present on admission)  Assessment & Plan  [facility]. Quit in 2/17    Anemia of chronic disease (present on admission)  Assessment & Plan  At baseline, iron def.    Morbid obesity (CMS-HCC) (present on admission)  Assessment & Plan  Chronic, complicating     Hypothyroidism (present on admission)  Assessment & Plan  On rx    EDITH (obstructive sleep apnea) (present on admission)  Assessment & Plan  h/o    Plan  C/w abx  R/o VTE, DVT prophy dosing for now  D/c fluids  TF's until seen by speech  BG control   am labs  Bp control  Pt with recent Echo and sign PAH at least moderate  See Orders  Pt is critically ill in ICU  Time spent 35 min, no overlap  Core Measures

## 2017-06-04 NOTE — CARE PLAN
Problem: Communication  Goal: The ability to communicate needs accurately and effectively will improve  Outcome: PROGRESSING AS EXPECTED  Pt alert and oriented, able to communicate needs clearly at this time.    Problem: Safety  Goal: Will remain free from falls  Outcome: PROGRESSING AS EXPECTED  Pt remains free from falls at this time - bed in lowest position, appropriate rails in use, bed alarm on, treaded socks on and call bell within reach, which pt utilizes appropriately. Rounded on hourly by RN to address any needs.

## 2017-06-05 ENCOUNTER — APPOINTMENT (OUTPATIENT)
Dept: RADIOLOGY | Facility: MEDICAL CENTER | Age: 67
DRG: 871 | End: 2017-06-05
Attending: INTERNAL MEDICINE
Payer: MEDICARE

## 2017-06-05 ENCOUNTER — APPOINTMENT (OUTPATIENT)
Dept: RADIOLOGY | Facility: MEDICAL CENTER | Age: 67
DRG: 871 | End: 2017-06-05
Attending: HOSPITALIST
Payer: MEDICARE

## 2017-06-05 VITALS
HEIGHT: 65 IN | SYSTOLIC BLOOD PRESSURE: 124 MMHG | HEART RATE: 80 BPM | BODY MASS INDEX: 43.15 KG/M2 | RESPIRATION RATE: 18 BRPM | DIASTOLIC BLOOD PRESSURE: 80 MMHG | WEIGHT: 259 LBS | TEMPERATURE: 98.3 F

## 2017-06-05 PROBLEM — S82.899A ANKLE FRACTURE: Status: ACTIVE | Noted: 2017-06-05

## 2017-06-05 PROBLEM — R09.02 HYPOXIA: Status: ACTIVE | Noted: 2017-06-05

## 2017-06-05 LAB
ALBUMIN SERPL BCP-MCNC: 2.7 G/DL (ref 3.2–4.9)
ALBUMIN/GLOB SERPL: 1 G/DL
ALP SERPL-CCNC: 41 U/L (ref 30–99)
ALT SERPL-CCNC: 10 U/L (ref 2–50)
ANION GAP SERPL CALC-SCNC: 8 MMOL/L (ref 0–11.9)
AST SERPL-CCNC: 11 U/L (ref 12–45)
BASOPHILS # BLD AUTO: 0.4 % (ref 0–1.8)
BASOPHILS # BLD: 0.04 K/UL (ref 0–0.12)
BILIRUB SERPL-MCNC: 0.9 MG/DL (ref 0.1–1.5)
BNP SERPL-MCNC: 552 PG/ML (ref 0–100)
BUN SERPL-MCNC: 27 MG/DL (ref 8–22)
CALCIUM SERPL-MCNC: 8.7 MG/DL (ref 8.5–10.5)
CHLORIDE SERPL-SCNC: 104 MMOL/L (ref 96–112)
CO2 SERPL-SCNC: 26 MMOL/L (ref 20–33)
CREAT SERPL-MCNC: 1.1 MG/DL (ref 0.5–1.4)
CRP SERPL HS-MCNC: 7.24 MG/DL (ref 0–0.75)
EOSINOPHIL # BLD AUTO: 0.3 K/UL (ref 0–0.51)
EOSINOPHIL NFR BLD: 3.3 % (ref 0–6.9)
ERYTHROCYTE [DISTWIDTH] IN BLOOD BY AUTOMATED COUNT: 58.4 FL (ref 35.9–50)
GFR SERPL CREATININE-BSD FRML MDRD: 49 ML/MIN/1.73 M 2
GLOBULIN SER CALC-MCNC: 2.6 G/DL (ref 1.9–3.5)
GLUCOSE BLD-MCNC: 125 MG/DL (ref 65–99)
GLUCOSE BLD-MCNC: 126 MG/DL (ref 65–99)
GLUCOSE BLD-MCNC: 140 MG/DL (ref 65–99)
GLUCOSE BLD-MCNC: 77 MG/DL (ref 65–99)
GLUCOSE SERPL-MCNC: 66 MG/DL (ref 65–99)
HCT VFR BLD AUTO: 23.2 % (ref 37–47)
HGB BLD-MCNC: 7 G/DL (ref 12–16)
IMM GRANULOCYTES # BLD AUTO: 0.06 K/UL (ref 0–0.11)
IMM GRANULOCYTES NFR BLD AUTO: 0.7 % (ref 0–0.9)
LYMPHOCYTES # BLD AUTO: 1.33 K/UL (ref 1–4.8)
LYMPHOCYTES NFR BLD: 14.4 % (ref 22–41)
MAGNESIUM SERPL-MCNC: 1.7 MG/DL (ref 1.5–2.5)
MCH RBC QN AUTO: 28.2 PG (ref 27–33)
MCHC RBC AUTO-ENTMCNC: 30.2 G/DL (ref 33.6–35)
MCV RBC AUTO: 93.5 FL (ref 81.4–97.8)
MONOCYTES # BLD AUTO: 0.78 K/UL (ref 0–0.85)
MONOCYTES NFR BLD AUTO: 8.5 % (ref 0–13.4)
NEUTROPHILS # BLD AUTO: 6.72 K/UL (ref 2–7.15)
NEUTROPHILS NFR BLD: 72.7 % (ref 44–72)
NRBC # BLD AUTO: 0 K/UL
NRBC BLD AUTO-RTO: 0 /100 WBC
PHOSPHATE SERPL-MCNC: 3.3 MG/DL (ref 2.5–4.5)
PLATELET # BLD AUTO: 179 K/UL (ref 164–446)
PMV BLD AUTO: 9.3 FL (ref 9–12.9)
POTASSIUM SERPL-SCNC: 3.3 MMOL/L (ref 3.6–5.5)
PREALB SERPL-MCNC: 11 MG/DL (ref 18–38)
PROT SERPL-MCNC: 5.3 G/DL (ref 6–8.2)
RBC # BLD AUTO: 2.48 M/UL (ref 4.2–5.4)
SODIUM SERPL-SCNC: 138 MMOL/L (ref 135–145)
WBC # BLD AUTO: 9.2 K/UL (ref 4.8–10.8)

## 2017-06-05 PROCEDURE — 700111 HCHG RX REV CODE 636 W/ 250 OVERRIDE (IP)

## 2017-06-05 PROCEDURE — 700102 HCHG RX REV CODE 250 W/ 637 OVERRIDE(OP): Performed by: INTERNAL MEDICINE

## 2017-06-05 PROCEDURE — G8978 MOBILITY CURRENT STATUS: HCPCS | Mod: CK

## 2017-06-05 PROCEDURE — 700105 HCHG RX REV CODE 258: Performed by: INTERNAL MEDICINE

## 2017-06-05 PROCEDURE — A9270 NON-COVERED ITEM OR SERVICE: HCPCS | Performed by: INTERNAL MEDICINE

## 2017-06-05 PROCEDURE — 99233 SBSQ HOSP IP/OBS HIGH 50: CPT | Performed by: HOSPITALIST

## 2017-06-05 PROCEDURE — 99291 CRITICAL CARE FIRST HOUR: CPT | Performed by: INTERNAL MEDICINE

## 2017-06-05 PROCEDURE — 97162 PT EVAL MOD COMPLEX 30 MIN: CPT

## 2017-06-05 PROCEDURE — 84134 ASSAY OF PREALBUMIN: CPT

## 2017-06-05 PROCEDURE — 700111 HCHG RX REV CODE 636 W/ 250 OVERRIDE (IP): Performed by: INTERNAL MEDICINE

## 2017-06-05 PROCEDURE — A9270 NON-COVERED ITEM OR SERVICE: HCPCS

## 2017-06-05 PROCEDURE — 700111 HCHG RX REV CODE 636 W/ 250 OVERRIDE (IP): Performed by: HOSPITALIST

## 2017-06-05 PROCEDURE — 97166 OT EVAL MOD COMPLEX 45 MIN: CPT

## 2017-06-05 PROCEDURE — 700102 HCHG RX REV CODE 250 W/ 637 OVERRIDE(OP): Performed by: HOSPITALIST

## 2017-06-05 PROCEDURE — G8988 SELF CARE GOAL STATUS: HCPCS | Mod: CI

## 2017-06-05 PROCEDURE — 770006 HCHG ROOM/CARE - MED/SURG/GYN SEMI*

## 2017-06-05 PROCEDURE — 80053 COMPREHEN METABOLIC PANEL: CPT

## 2017-06-05 PROCEDURE — 700105 HCHG RX REV CODE 258

## 2017-06-05 PROCEDURE — A9270 NON-COVERED ITEM OR SERVICE: HCPCS | Performed by: HOSPITALIST

## 2017-06-05 PROCEDURE — 84100 ASSAY OF PHOSPHORUS: CPT

## 2017-06-05 PROCEDURE — 700102 HCHG RX REV CODE 250 W/ 637 OVERRIDE(OP): Performed by: NURSE PRACTITIONER

## 2017-06-05 PROCEDURE — G8987 SELF CARE CURRENT STATUS: HCPCS | Mod: CK

## 2017-06-05 PROCEDURE — 86140 C-REACTIVE PROTEIN: CPT

## 2017-06-05 PROCEDURE — A9270 NON-COVERED ITEM OR SERVICE: HCPCS | Performed by: NURSE PRACTITIONER

## 2017-06-05 PROCEDURE — 82962 GLUCOSE BLOOD TEST: CPT | Mod: 91

## 2017-06-05 PROCEDURE — 71275 CT ANGIOGRAPHY CHEST: CPT

## 2017-06-05 PROCEDURE — G8979 MOBILITY GOAL STATUS: HCPCS | Mod: CI

## 2017-06-05 PROCEDURE — 700102 HCHG RX REV CODE 250 W/ 637 OVERRIDE(OP)

## 2017-06-05 PROCEDURE — 83880 ASSAY OF NATRIURETIC PEPTIDE: CPT

## 2017-06-05 PROCEDURE — 85025 COMPLETE CBC W/AUTO DIFF WBC: CPT

## 2017-06-05 PROCEDURE — 83735 ASSAY OF MAGNESIUM: CPT

## 2017-06-05 PROCEDURE — 700117 HCHG RX CONTRAST REV CODE 255: Performed by: HOSPITALIST

## 2017-06-05 RX ORDER — FUROSEMIDE 20 MG/1
20 TABLET ORAL
Status: DISCONTINUED | OUTPATIENT
Start: 2017-06-05 | End: 2017-06-09

## 2017-06-05 RX ORDER — POTASSIUM CHLORIDE 1.5 G/1.58G
60 POWDER, FOR SOLUTION ORAL ONCE
Status: COMPLETED | OUTPATIENT
Start: 2017-06-05 | End: 2017-06-05

## 2017-06-05 RX ORDER — CARVEDILOL 3.12 MG/1
3.12 TABLET ORAL 2 TIMES DAILY WITH MEALS
Status: DISCONTINUED | OUTPATIENT
Start: 2017-06-05 | End: 2017-06-09 | Stop reason: HOSPADM

## 2017-06-05 RX ORDER — AMLODIPINE BESYLATE 10 MG/1
10 TABLET ORAL
Status: DISCONTINUED | OUTPATIENT
Start: 2017-06-05 | End: 2017-06-09 | Stop reason: HOSPADM

## 2017-06-05 RX ORDER — CYCLOBENZAPRINE HCL 10 MG
10 TABLET ORAL ONCE
Status: COMPLETED | OUTPATIENT
Start: 2017-06-05 | End: 2017-06-05

## 2017-06-05 RX ORDER — LOPERAMIDE HYDROCHLORIDE 2 MG/1
2 CAPSULE ORAL 4 TIMES DAILY PRN
Status: DISCONTINUED | OUTPATIENT
Start: 2017-06-05 | End: 2017-06-06

## 2017-06-05 RX ADMIN — MAGNESIUM SULFATE HEPTAHYDRATE 2 G: 500 INJECTION, SOLUTION INTRAMUSCULAR; INTRAVENOUS at 08:21

## 2017-06-05 RX ADMIN — FUROSEMIDE 20 MG: 20 TABLET ORAL at 16:22

## 2017-06-05 RX ADMIN — AMPICILLIN SODIUM AND SULBACTAM SODIUM 3 G: 2; 1 INJECTION, POWDER, FOR SOLUTION INTRAMUSCULAR; INTRAVENOUS at 11:42

## 2017-06-05 RX ADMIN — BUDESONIDE AND FORMOTEROL FUMARATE DIHYDRATE 2 PUFF: 160; 4.5 AEROSOL RESPIRATORY (INHALATION) at 08:17

## 2017-06-05 RX ADMIN — OXYCODONE HYDROCHLORIDE 10 MG: 10 TABLET ORAL at 01:11

## 2017-06-05 RX ADMIN — TIOTROPIUM BROMIDE 1 CAPSULE: 18 CAPSULE ORAL; RESPIRATORY (INHALATION) at 08:18

## 2017-06-05 RX ADMIN — PIPERACILLIN SODIUM AND TAZOBACTAM SODIUM 4.5 G: 4; .5 INJECTION, POWDER, FOR SOLUTION INTRAVENOUS at 05:13

## 2017-06-05 RX ADMIN — CARVEDILOL 3.12 MG: 3.12 TABLET, FILM COATED ORAL at 16:22

## 2017-06-05 RX ADMIN — OMEPRAZOLE 40 MG: 20 CAPSULE, DELAYED RELEASE ORAL at 08:12

## 2017-06-05 RX ADMIN — PIPERACILLIN SODIUM AND TAZOBACTAM SODIUM 4.5 G: 4; .5 INJECTION, POWDER, FOR SOLUTION INTRAVENOUS at 00:11

## 2017-06-05 RX ADMIN — HEPARIN SODIUM 5000 UNITS: 5000 INJECTION, SOLUTION INTRAVENOUS; SUBCUTANEOUS at 05:12

## 2017-06-05 RX ADMIN — FUROSEMIDE 20 MG: 10 INJECTION, SOLUTION INTRAVENOUS at 05:12

## 2017-06-05 RX ADMIN — OXYCODONE HYDROCHLORIDE 10 MG: 10 TABLET ORAL at 09:20

## 2017-06-05 RX ADMIN — DULOXETINE HYDROCHLORIDE 60 MG: 60 CAPSULE, DELAYED RELEASE ORAL at 08:13

## 2017-06-05 RX ADMIN — INSULIN GLARGINE 20 UNITS: 100 INJECTION, SOLUTION SUBCUTANEOUS at 20:38

## 2017-06-05 RX ADMIN — LOPERAMIDE HYDROCHLORIDE 2 MG: 2 CAPSULE ORAL at 09:19

## 2017-06-05 RX ADMIN — AMPICILLIN SODIUM AND SULBACTAM SODIUM 3 G: 2; 1 INJECTION, POWDER, FOR SOLUTION INTRAMUSCULAR; INTRAVENOUS at 17:36

## 2017-06-05 RX ADMIN — AMLODIPINE BESYLATE 10 MG: 10 TABLET ORAL at 09:20

## 2017-06-05 RX ADMIN — OXYCODONE HYDROCHLORIDE 10 MG: 10 TABLET ORAL at 16:22

## 2017-06-05 RX ADMIN — IOHEXOL 75 ML: 350 INJECTION, SOLUTION INTRAVENOUS at 13:17

## 2017-06-05 RX ADMIN — POTASSIUM CHLORIDE 20 MEQ: 1.5 POWDER, FOR SOLUTION ORAL at 08:12

## 2017-06-05 RX ADMIN — OXYCODONE HYDROCHLORIDE 10 MG: 10 TABLET ORAL at 05:12

## 2017-06-05 RX ADMIN — HEPARIN SODIUM 5000 UNITS: 5000 INJECTION, SOLUTION INTRAVENOUS; SUBCUTANEOUS at 14:32

## 2017-06-05 RX ADMIN — VANCOMYCIN HYDROCHLORIDE 1300 MG: 100 INJECTION, POWDER, LYOPHILIZED, FOR SOLUTION INTRAVENOUS at 05:50

## 2017-06-05 RX ADMIN — CYCLOBENZAPRINE 10 MG: 10 TABLET, FILM COATED ORAL at 20:30

## 2017-06-05 RX ADMIN — OXYCODONE HYDROCHLORIDE 10 MG: 10 TABLET ORAL at 20:06

## 2017-06-05 RX ADMIN — FENTANYL CITRATE 25 MCG: 50 INJECTION, SOLUTION INTRAMUSCULAR; INTRAVENOUS at 01:57

## 2017-06-05 RX ADMIN — OXYCODONE HYDROCHLORIDE 10 MG: 10 TABLET ORAL at 12:35

## 2017-06-05 RX ADMIN — GABAPENTIN 300 MG: 300 CAPSULE ORAL at 08:12

## 2017-06-05 RX ADMIN — LEVOTHYROXINE SODIUM 75 MCG: 75 TABLET ORAL at 05:23

## 2017-06-05 RX ADMIN — GABAPENTIN 300 MG: 300 CAPSULE ORAL at 20:06

## 2017-06-05 RX ADMIN — POTASSIUM CHLORIDE 60 MEQ: 1.5 POWDER, FOR SOLUTION ORAL at 14:32

## 2017-06-05 RX ADMIN — BUDESONIDE AND FORMOTEROL FUMARATE DIHYDRATE 2 PUFF: 160; 4.5 AEROSOL RESPIRATORY (INHALATION) at 20:15

## 2017-06-05 RX ADMIN — MONTELUKAST SODIUM 10 MG: 10 TABLET, FILM COATED ORAL at 20:06

## 2017-06-05 RX ADMIN — HEPARIN SODIUM 5000 UNITS: 5000 INJECTION, SOLUTION INTRAVENOUS; SUBCUTANEOUS at 20:32

## 2017-06-05 ASSESSMENT — ENCOUNTER SYMPTOMS
BACK PAIN: 1
SHORTNESS OF BREATH: 1
COUGH: 1
GASTROINTESTINAL NEGATIVE: 1
SPUTUM PRODUCTION: 1
HEADACHES: 0
FEVER: 0
EYES NEGATIVE: 1
PSYCHIATRIC NEGATIVE: 1
CHILLS: 0
WEAKNESS: 1

## 2017-06-05 ASSESSMENT — COGNITIVE AND FUNCTIONAL STATUS - GENERAL
CLIMB 3 TO 5 STEPS WITH RAILING: TOTAL
MOBILITY SCORE: 18
STANDING UP FROM CHAIR USING ARMS: A LITTLE
PERSONAL GROOMING: A LITTLE
DRESSING REGULAR UPPER BODY CLOTHING: A LITTLE
SUGGESTED CMS G CODE MODIFIER MOBILITY: CK
DAILY ACTIVITIY SCORE: 15
HELP NEEDED FOR BATHING: A LOT
SUGGESTED CMS G CODE MODIFIER DAILY ACTIVITY: CK
MOVING FROM LYING ON BACK TO SITTING ON SIDE OF FLAT BED: A LITTLE
TOILETING: TOTAL
WALKING IN HOSPITAL ROOM: A LITTLE
DRESSING REGULAR LOWER BODY CLOTHING: A LOT

## 2017-06-05 ASSESSMENT — GAIT ASSESSMENTS
DEVIATION: ANTALGIC
ASSISTIVE DEVICE: FRONT WHEEL WALKER
GAIT LEVEL OF ASSIST: CONTACT GUARD ASSIST

## 2017-06-05 ASSESSMENT — PAIN SCALES - GENERAL
PAINLEVEL_OUTOF10: 3
PAINLEVEL_OUTOF10: 8
PAINLEVEL_OUTOF10: 6
PAINLEVEL_OUTOF10: 8
PAINLEVEL_OUTOF10: 5
PAINLEVEL_OUTOF10: 8
PAINLEVEL_OUTOF10: 8
PAINLEVEL_OUTOF10: 6
PAINLEVEL_OUTOF10: 6
PAINLEVEL_OUTOF10: 3
PAINLEVEL_OUTOF10: 3
PAINLEVEL_OUTOF10: 4
PAINLEVEL_OUTOF10: 6
PAINLEVEL_OUTOF10: 6
PAINLEVEL_OUTOF10: 3

## 2017-06-05 ASSESSMENT — ACTIVITIES OF DAILY LIVING (ADL): TOILETING: INDEPENDENT

## 2017-06-05 NOTE — THERAPY
"Occupational Therapy Evaluation completed.   Functional Status:  Min A supine to sit.  Max A to don cam boot, pt refusing to don non-skid socks despite education.  CGA sit to stand and to transfer to bedside chair.  Pt de-sat during activity to 82% but recovers to >90% with rest.  Plan of Care: Will benefit from Occupational Therapy 3 times per week  Discharge Recommendations:  Equipment: Will Continue to Assess for Equipment Needs. Post-acute therapy Discharge to a transitional care facility for continued skilled therapy services.    See \"Rehab Therapy-Acute\" Patient Summary Report for complete documentation.    "

## 2017-06-05 NOTE — PROGRESS NOTES
Pulmonary Critical Care Progress Note      Date of Service: 6/5/2017    Chief Complaint: Worsening shortness of breath and altered mental status    History of Present Illness:  Please notes that the patient was sedated and intubated prior to obtaining history and all history was obtained from old chart records as well as the ER history physician, Dr. Bae.  The patient is a 66-year-old female who was receiving rehabilitation at the [facility] after undergoing an ankle fracture several weeks ago, who developed urinary tract infection symptoms several days ago and was being treated for a UTI.  Apparently, she had fevers up to 102.  However, over the course of the evening, the patient started having worsening shortness of breath and altered mental status.  Due to worsening symptoms of shortness of breath as well as continued altered mental status, it was decided to send the patient to the emergency department.  Upon arrival to the emergency department, the patient continued to be unresponsive and the decision to intubate was made.  Once the patient was intubated and a central line was    placed.  The patient immediately had drops in her blood pressure into the 60s and 70s and based on her chest x-ray, they thought that perhaps she had healthcare-acquired pneumonia and was started on the sepsis protocol with 4 liters of normal saline boluses as well as vasopressor therapy.    ROS:  Unable to obtain as the patient is intubated and sedated    Interval Events:  24 hour interval history reviewed   Tmax 98.4 °F   -2.4L over the last 24 hours, +1.7L since admit   CXR shows: none this AM.    Complaints of pain from fractured ankle, but otherwise awake, alert, moves all extremities.   SR, -160 systolic.   Speech eval yesterday.  Mobilized to chair.   Electrolytes replaced per protocol.     PFSH:  No change.    Physical Exam:  General:  Alert, oriented.   HEENT: Normocephalic, atraumatic, PERRL.    CVS:  Bradycardic, regular rhythm.  Respiratory:  Diminished breath sounds.   Abdomen:  Soft.  Extremities:  Without edema. Left leg with walking boot.  Neuro/Psych:  Normal affect and behavior.     Respiratory:     Pulse Oximetry: 92 %  ImagingAvailable data reviewed   CXR reviewed with team  Recent Labs      06/02/17   0723  06/03/17   0549   ISTATAPH  7.302*  7.377*   ISTATAPCO2  55.7*  41.4*   ISTATAPO2  136*  91*   ISTATATCO2  29  26   TVYPRDI0KGX  99  97   ISTATARTHCO3  27.5*  24.3   ISTATARTBE  1  -1   ISTATTEMP  107.0 F  97.7 F   ISTATFIO2  80  50   ISTATSPEC  Arterial  Arterial   ISTATAPHTC  7.236*  7.384*   [id number]  168*  88*     HemoDynamics:  Pulse: 69, Heart Rate (Monitored): 70  NIBP: 158/47 mmHg  CVP (mm Hg): (!) 12 MM HG  Imaging: Available data reviewed     Recent Labs      06/02/17   1140  06/02/17   1630  06/02/17   2025  06/04/17   0535  06/05/17   0320   TROPONINI  0.04  0.03  0.02   --    --    BNPBTYPENAT   --    --    --   423*  552*     Neuro:  GCS Total Jackson Coma Score: 15  Imaging: Available data reviewed    Fluids:  Intake/Output       06/03/17 0700 - 06/04/17 0659 06/04/17 0700 - 06/05/17 0659 06/05/17 0700 - 06/06/17 0659      0163-2411 2316-1007 Total 1315-3229 6007-7763 Total 8172-0215 9993-0742 Total       Intake    P.O.  --  -- --  540  960 1500  --  -- --    P.O. -- -- --  -- -- --    I.V.  1507.3  1346 2853.3  532  550 1082  --  -- --    Vasopressin Volume 0 -- 0 -- -- -- -- -- --    Propofol Volume 10.3 -- 10.3 -- -- -- -- -- --    Norepinephrine Volume 7 -- 7 -- -- -- -- -- --    IV Volume (< ns >) 7480 641 6471 332 -- 332 -- -- --    IV Piggyback Volume (IV Piggyback) 200 350 550 200 550 750 -- -- --    Other  140  90 230  100  -- 100  --  -- --    Medications (P.O./ Enteral Liquids) 140 90 230 100 -- 100 -- -- --    Enteral  391  640 1031  500  -- 500  --  -- --    Enteral Volume 225 550 775 500 -- 500 -- -- --    Free Water / Tube Flush 166 90 256 -- --  -- -- -- --    Total Intake 2038.3 2076 4114.3 1672 1510 3182 -- -- --       Output    Urine  850  800 1650  3225  2810 6035  --  -- --    Indwelling Cathether  3225 2810 6035 -- -- --    Drains  --  -- --  0  -- 0  --  -- --    Residual Amount (ml) (Discarded) -- -- -- 0 -- 0 -- -- --    Stool  --  -- --  --  -- --  --  -- --    Number of Times Stooled 2 x -- 2 x 1 x -- 1 x -- -- --    Total Output  3225 2810 6035 -- -- --       Net I/O     1188.3 1276 2464.3 -6362 -1263 -1863 -- -- --        Weight: 104 kg (229 lb 4.5 oz)  Recent Labs      1735  17   0320   SODIUM  140  144  138   POTASSIUM  4.0  3.7  3.3*   CHLORIDE  109  112  104   CO2  25  24  26   BUN  24*  26*  27*   CREATININE  1.25  0.95  1.10   MAGNESIUM  1.6  1.9  1.7   PHOSPHORUS  3.4  3.5  3.3   CALCIUM  8.2*  8.4*  8.7     GI/Nutrition:  Imaging: Available data reviewed  NPO     Liver Function:  Recent Labs      1735  17   0320   ALTSGPT  19   --   12  10   ASTSGOT  32   --   12  11*   ALKPHOSPHAT  68   --   44  41   TBILIRUBIN  0.6   --   0.6  0.9   PREALBUMIN   --    --    --   11.0*   GLUCOSE  146*  242*  165*  66     Heme:  Recent Labs      17   0535  17   0320   RBC  2.74*  2.64*  2.53*  2.48*   HEMOGLOBIN  7.9*  7.6*  7.3*  7.0*   HEMATOCRIT  26.3*  24.9*  24.1*  23.2*   PLATELETCT  243  189  173  179   PROTHROMBTM  14.0   --    --    --    APTT  31.9   --    --    --    INR  1.05   --    --    --      Infectious Disease:  Temp  Av.9 °C (98.4 °F)  Min: 36.6 °C (97.8 °F)  Max: 37.3 °C (99.1 °F)  Monitored Temp  Av.8 °C (98.3 °F)  Min: 36.8 °C (98.2 °F)  Max: 36.9 °C (98.4 °F)  Micro: reviewed   Recent Labs      17   0745  17   0520  17   0535  17   0320   WBC  13.3*  17.0*  11.4*  9.2   NEUTSPOLYS  83.90*  93.10*  87.50*  72.70*   LYMPHOCYTES  6.20*  2.70*   6.90*  14.40*   MONOCYTES  7.40  3.20  3.90  8.50   EOSINOPHILS  1.40  0.00  0.80  3.30   BASOPHILS  0.30  0.20  0.30  0.40   ASTSGOT  32   --   12  11*   ALTSGPT  19   --   12  10   ALKPHOSPHAT  68   --   44  41   TBILIRUBIN  0.6   --   0.6  0.9     Current Facility-Administered Medications   Medication Dose Frequency Provider Last Rate Last Dose   • magnesium sulfate 2 g in  mL IVPB  2 g Once Sierra Vela M.D.       • potassium chloride (KLOR-CON) 20 MEQ packet 60 mEq  60 mEq Once Sierra Vela M.D.       • vancomycin 1,300 mg in  mL IVPB  1,300 mg Q24HR DAVIS EspinoD 250 mL/hr at 06/05/17 0550 1,300 mg at 06/05/17 0550   • furosemide (LASIX) injection 20 mg  20 mg BID DIURETIC Larry Cordero M.D.   20 mg at 06/05/17 0512   • potassium chloride (KLOR-CON) 20 MEQ packet 20 mEq  20 mEq BID Larry Cordero M.D.   20 mEq at 06/04/17 2030   • insulin lispro (HUMALOG) injection 3-14 Units  3-14 Units 4X/DAY ACHS Larry Cordero M.D.   Stopped at 06/05/17 0700   • levothyroxine (SYNTHROID) tablet 75 mcg  75 mcg AM ES DAVIS EspinoD   75 mcg at 06/05/17 0523   • albuterol inhaler 2 Puff  2 Puff Q4HRS PRN Sierra Vela M.D.   2 Puff at 06/04/17 1234   • budesonide-formoterol (SYMBICORT) 160-4.5 MCG/ACT inhaler 2 Puff  2 Puff BID Sierra Vela M.D.   2 Puff at 06/04/17 2032   • tiotropium (SPIRIVA) 18 MCG inhalation capsule 1 Cap  1 Cap DAILY Sierra Vela M.D.   1 Cap at 06/04/17 0823   • Respiratory Care per Protocol   Continuous RT Sierra Vela M.D.       • chlorhexidine (PERIDEX) 0.12 % solution 15 mL  15 mL BID Sierra Vela M.D.   Stopped at 06/04/17 0900   • MD ALERT...Adult ICU Electrolyte Replacement per Pharmacy Protocol   pharmacy to dose Sierra Vela M.D.       • heparin injection 5,000 Units  5,000 Units Q8HRS Sierra Vela M.D.   5,000 Units at 06/05/17 0512   • fentaNYL (SUBLIMAZE) injection 25 mcg  25 mcg Q HOUR PRN Sierra ALFARO  JAYCOB Vela   25 mcg at 06/05/17 0157    Or   • fentaNYL (SUBLIMAZE) injection 50 mcg  50 mcg Q HOUR PRN Sierra Vela M.D.        Or   • fentaNYL (SUBLIMAZE) injection 100 mcg  100 mcg Q HOUR PRN Sierra Vela M.D.       • piperacillin-tazobactam (ZOSYN) 4.5 g in  mL IVPB  4.5 g Q6HRS Sierra Vela M.D.   Stopped at 06/05/17 0613   • MD ALERT... vancomycin per pharmacy protocol   pharmacy to dose Sierra Vela M.D.       • duloxetine (CYMBALTA) capsule 60 mg  60 mg DAILY GHULAM LojaO.   60 mg at 06/04/17 0822   • gabapentin (NEURONTIN) capsule 300 mg  300 mg BID ROBY Loja.O.   300 mg at 06/04/17 2030   • insulin glargine (LANTUS) injection 20 Units  20 Units Nightly GHULAM LojaO.   20 Units at 06/04/17 2036   • montelukast (SINGULAIR) tablet 10 mg  10 mg Q EVENING GHULAM LojaO.   10 mg at 06/04/17 2030   • omeprazole 2 mg/mL in sodium bicarbonate (PRILOSEC) oral susp 40 mg  40 mg DAILY GHULAM LojaO.   40 mg at 06/04/17 0822   • senna-docusate (PERICOLACE or SENOKOT S) 8.6-50 MG per tablet 2 Tab  2 Tab BID Angel Huynh D.O.   Stopped at 06/04/17 2100    And   • polyethylene glycol/lytes (MIRALAX) PACKET 1 Packet  1 Packet QDAY PRN Angel Huynh D.O.        And   • magnesium hydroxide (MILK OF MAGNESIA) suspension 30 mL  30 mL QDAY PRN Angel Huynh D.O.        And   • bisacodyl (DULCOLAX) suppository 10 mg  10 mg QDAY PRN GHULAM LojaO.       • acetaminophen (TYLENOL) tablet 650 mg  650 mg Q6HRS PRN GHULAM LojaO.       • glucose 4 g chewable tablet 16 g  16 g Q15 MIN PRN Angel W Lino, D.O.        And   • dextrose 50% (D50W) injection 25 mL  25 mL Q15 MIN PRN Angel Huynh D.O.       • ondansetron (ZOFRAN) syringe/vial injection 4 mg  4 mg Q4HRS PRN Angel Huynh D.O.       • ondansetron (ZOFRAN ODT) dispertab 4 mg  4 mg Q4HRS PRN Angel Huynh D.O.       • oxycodone immediate release (ROXICODONE) tablet 10 mg  10 mg Q3HRS PRN Angel Huynh,  D.O.   10 mg at 06/05/17 0512   • acetaminophen (TYLENOL) suppository 650 mg  650 mg Q6HRS PRN Angel Huynh, D.O.   650 mg at 06/02/17 1255     Last reviewed on 6/2/2017 10:05 AM by Rhea Cui    Quality  Measures:  Labs reviewed, Medications reviewed, Radiology images reviewed and EKG reviewed  Mckeon catheter: Critically Ill - Requiring Accurate Measurement of Urinary Output  Central line in place: Need for access, Vasopressors, Sepsis and Shock    DVT Prophylaxis: Heparin  DVT prophylaxis - mechanical: SCDs  Ulcer prophylaxis: Yes  Antibiotics: Treating active infection/contamination beyond 24 hours perioperative coverage  Assessed for rehab: Patient was assess for and/or received rehabilitation services during this hospitalization    Lines:  Right IJ 6/2    Gtts:  None    Abx:  Day 4 Vancomycin  Day 4 Zosyn     Ucx 6/2 negative  Bcx 6/2 negative  Scx 6/2 negative     Echo 3/16 EF 60%, RVSP 60.   LE Doppler 6/4 (-) for dvt    Problems/Plan:  Acute hypoxic respiratory failure.   - extubated on 6/3   - cont RT/O2 protocols   - diuresing well   - encourage IS/PEP  Severe sepsis with septic shock, likely due to pulmonary source.   - s/p sepsis protocol   - lactates normalized after IVF   - de escalate abx   - follow cultures  Healthcare-acquired pneumonia.   - follow cultures   - change abx to unasyn  Acute on chronic kidney disease.   - improved with fluid resuscitation   - avoid nephrotoxins  Hyperglycemia.   - ISS  Moderate protein-calorie malnutrition.   - cont enteral feedings  Acute leukocytosis.   - improving  Anemia.   - trending   - suspect due to chronic disease  Relative adrenal insufficiency.   - cortisol level < 10   - off steroids  Elevated troponin level  History of hepatitis C.  History of type 2 diabetes.  History of oxygen dependent chronic obstructive pulmonary disease.  History of asthma.  History of stage III chronic kidney disease.  History of posttraumatic stress disorder.  History  of systemic arterial hypertension.  History of pulmonary hypertension likely related to COPD  History of recent left trimalleolar ankle fracture with ORIF.    D/C Vancomycin  Switch Zosyn to Unasyn.     Discussed patient condition and risk of morbidity and/or mortality with RN, RT, Pharmacy, Charge nurse / hot rounds, Patient and hospitalist.    The patient remains critically ill.  Critical care time = 32 minutes in directly providing and coordinating critical care and extensive data review.  No time overlap and excludes procedures.    Lizzie CLAY (Alla), am scribing for, and in the presence of, William Hope M.D.    Electronically signed by: Lizzie Chambers (Alla), 6/5/2017    IWilliam M.D. personally performed the services described in this documentation, as scribed by Lizzie Chambers in my presence, and it is both accurate and complete.

## 2017-06-05 NOTE — CARE PLAN
Problem: Communication  Goal: The ability to communicate needs accurately and effectively will improve  Outcome: PROGRESSING AS EXPECTED    Problem: Safety  Goal: Will remain free from injury  Outcome: PROGRESSING AS EXPECTED    Problem: Infection  Goal: Will remain free from infection  Outcome: PROGRESSING AS EXPECTED    Problem: Venous Thromboembolism (VTW)/Deep Vein Thrombosis (DVT) Prevention:  Goal: Patient will participate in Venous Thrombosis (VTE)/Deep Vein Thrombosis (DVT)Prevention Measures  Outcome: PROGRESSING AS EXPECTED    Problem: Knowledge Deficit  Goal: Knowledge of disease process/condition, treatment plan, diagnostic tests, and medications will improve  Outcome: PROGRESSING AS EXPECTED    Problem: Respiratory:  Goal: Respiratory status will improve  Outcome: PROGRESSING SLOWER THAN EXPECTED    Problem: Fluid Volume:  Goal: Will maintain balanced intake and output  Outcome: PROGRESSING AS EXPECTED    Problem: Skin Integrity  Goal: Risk for impaired skin integrity will decrease  Outcome: PROGRESSING AS EXPECTED    Problem: Mechanical Ventilation  Goal: Safe management of artificial airway and ventilation  Outcome: MET Date Met:  06/05/17  Goal: Successful weaning off mechanical ventilator. Spontaneously maintains adequate gas exchange  Outcome: MET Date Met:  06/05/17  Patient no longer on ventilator    Problem: Hemodynamic Status  Goal: Vital Signs and Fluid Balance Management  Outcome: PROGRESSING AS EXPECTED    Problem: Pain Management  Goal: Pain level will decrease to patient’s comfort goal  Outcome: PROGRESSING AS EXPECTED

## 2017-06-05 NOTE — PROGRESS NOTES
[facility] Hospitalist Progress Note    Date of Service: 2017    Chief Complaint  66 y.o. female admitted 2017 with resp. Failure, admitted from SNF, Pt with recent hospitalization, ankle Fx, UTI, chr. Obesity, COPD and h/o tobacco abuse    Interval Problem Update  Patient seen and examined today. ICU Care  Care and plan discussed in IDT/Hot rounds.  Lines and assistive devices reviewed.    Patient tolerating treatment and therapies.  All Data, Medication data reviewed.  Case discussed with nursing as available.  Plan of Care reviewed with patient and notified of changes.  6/3 Pt off pressors this am, awake and writing notes, for SBT's and poss. Extubation  H/o CKD, labs better this am,Pt only remembers N/V prior to the event   Pt improved, with activity sign SOB and desaturation noted, no SLP done yet, core trak, wants to eat, is accepting DVT prophy now, ? Of VTE event, CXR looks wet   Pt further improved, AxOx4, less dyspnea, some cough, no F/C, anemia with ?cause, recent iron study ok, denies blood loss, elevated D-dimer, no LE DVT seen    Consultants/Specialty  Pulm,CC    Disposition  ICU, post extubation  Now stable for medical transfer        Review of Systems   Constitutional: Positive for malaise/fatigue. Negative for fever and chills.   HENT: Negative.    Eyes: Negative.    Respiratory: Positive for cough, sputum production and shortness of breath.    Cardiovascular: Positive for leg swelling. Negative for chest pain.   Gastrointestinal: Negative.    Genitourinary: Negative.    Musculoskeletal: Positive for back pain and joint pain.   Skin: Positive for rash.   Neurological: Positive for weakness. Negative for headaches.   Endo/Heme/Allergies: Negative.    Psychiatric/Behavioral: Negative.    All other systems reviewed and are negative.     Physical Exam  Laboratory/Imaging   Hemodynamics  Temp (24hrs), Av.9 °C (98.4 °F), Min:36.6 °C (97.8 °F), Max:37.3 °C (99.1 °F)   Temperature: 37 °C (98.6  °F)  Pulse  Av  Min: 49  Max: 98 Heart Rate (Monitored): 63  NIBP: 148/70 mmHg     Respiratory      Respiration: (!) 26, Pulse Oximetry: 90 %, O2 Daily Delivery Respiratory : Silicone Nasal Cannula     #MDI/DPI Given: MDI/DPI x 1, Work Of Breathing / Effort: Mild;Tachypnea  RUL Breath Sounds: Expiratory Wheezes, RML Breath Sounds: Expiratory Wheezes, RLL Breath Sounds: Expiratory Wheezes, BIRD Breath Sounds: Expiratory Wheezes, LLL Breath Sounds: Expiratory Wheezes    Fluids    Intake/Output Summary (Last 24 hours) at 17 1052  Last data filed at 17 0900   Gross per 24 hour   Intake   2550 ml   Output   5360 ml   Net  -2810 ml       Nutrition  Orders Placed This Encounter   Procedures   • DIET ORDER     Standing Status: Standing      Number of Occurrences: 1      Standing Expiration Date:      Order Specific Question:  Diet:     Answer:  Diabetic [3]     Order Specific Question:  Texture/Fiber modifications:     Answer:  Dysphagia 3(Mechanical Soft)specify fluid consistency(question 6) [3]      Comments:  no rice     Order Specific Question:  Consistency/Fluid modifications:     Answer:  Thin Liquids [3]     Physical Exam   Constitutional: She is oriented to person, place, and time. She appears well-developed and well-nourished.   HENT:   Head: Normocephalic and atraumatic.   Nose: Nose normal.   Mouth/Throat: Oropharynx is clear and moist.   Eyes: Conjunctivae and EOM are normal. Pupils are equal, round, and reactive to light.   Neck: Normal range of motion. Neck supple. No JVD present. No thyromegaly present.   Cardiovascular: Normal rate, regular rhythm and normal heart sounds.  Exam reveals no gallop and no friction rub.    Pulmonary/Chest: Effort normal. She has decreased breath sounds. She has no wheezes. She has rhonchi. She has no rales.   Abdominal: Soft. Bowel sounds are normal. She exhibits no distension and no mass. There is no tenderness. There is no rebound and no guarding.    Musculoskeletal: Normal range of motion. She exhibits no edema or tenderness.   Lymphadenopathy:     She has no cervical adenopathy.   Neurological: She is alert and oriented to person, place, and time. No cranial nerve deficit.   Skin: Skin is warm and dry. She is not diaphoretic.   echymosis  LE ankle incision healing  Sutures removed   Psychiatric: She has a normal mood and affect. Her behavior is normal.   Nursing note and vitals reviewed.      Recent Labs      06/03/17   0520 06/04/17   0535  06/05/17   0320   WBC  17.0*  11.4*  9.2   RBC  2.64*  2.53*  2.48*   HEMOGLOBIN  7.6*  7.3*  7.0*   HEMATOCRIT  24.9*  24.1*  23.2*   MCV  94.3  95.3  93.5   MCH  28.8  28.9  28.2   MCHC  30.5*  30.3*  30.2*   RDW  59.5*  59.7*  58.4*   PLATELETCT  189  173  179   MPV  9.1  9.7  9.3     Recent Labs      06/03/17   0520  06/04/17   0535  06/05/17   0320   SODIUM  140  144  138   POTASSIUM  4.0  3.7  3.3*   CHLORIDE  109  112  104   CO2  25  24  26   GLUCOSE  242*  165*  66   BUN  24*  26*  27*   CREATININE  1.25  0.95  1.10   CALCIUM  8.2*  8.4*  8.7         Recent Labs      06/04/17   0535  06/05/17   0320   BNPBTYPENAT  423*  552*              Assessment/Plan     COPD (chronic obstructive pulmonary disease) (CMS-HCC) (present on admission)  Assessment & Plan  Currently no wheezing  On RT , steroids  C/w same, wean steroids    Hypertensive heart disease with heart failure (CMS-HCC) (present on admission)  Assessment & Plan  Follow, avoid fluid OL  On lasix    Acute on chronic respiratory failure with hypoxia and hypercapnia (CMS-HCC) (present on admission)  Assessment & Plan  VDRF, pt extubated    HCAP (healthcare-associated pneumonia) (present on admission)  Assessment & Plan  Will Tx broad spectrum, neg CX's    Sepsis (CMS-HCC) (present on admission)  Assessment & Plan  resolved    DM type 2, uncontrolled, with renal complications (CMS-HCC) (present on admission)  Assessment & Plan  Tight control ISS    CKD (chronic  kidney disease), stage II (present on admission)  Assessment & Plan  Improved, follow, avoid nephrotoxins    Chronic pain syndrome (present on admission)  Assessment & Plan  Judicious Rx    Tobacco abuse disorder (present on admission)  Assessment & Plan  [facility]. Quit in 2/17    Anemia of chronic disease (present on admission)  Assessment & Plan  Lower HB  ? Cause  Negative recent w/u, f/u am labs    Morbid obesity (CMS-HCC) (present on admission)  Assessment & Plan  Chronic, complicating     Hypothyroidism (present on admission)  Assessment & Plan  On rx    EDITH (obstructive sleep apnea) (present on admission)  Assessment & Plan  h/o    Plan  C/w abx, de-escalate  R/o VTE, DVT neg., would get CTA r/o PE  D/c fluids  TF's until seen by speech  BG control   am labs  Bp control  Pt with recent Echo and sign PAH at least moderate  See Orders  Pt is critically ill in ICU, but stable for downgrade  Time spent 35 min, no overlap  Core Measures

## 2017-06-05 NOTE — THERAPY
"Physical Therapy Evaluation completed.   Bed Mobility:  Supine to Sit: Minimal Assist  Transfers: Sit to Stand: Contact Guard Assist  Gait: Level Of Assist: Contact Guard Assist with Front-Wheel Walker       Plan of Care: Will benefit from Physical Therapy 3 times per week  Discharge Recommendations: Equipment: Will Continue to Assess for Equipment Needs.   Pt presents with impaired activity tolerance, weight shifting, LE endurance/control associated with acute on chronic deconditioning/PNA. Pt with possible desaturation with all mobility, 82% with good wave form but pt denies symptoms; pt holding breath during transitionings, discussed exhalation on exertion. Pt with poor weight shifting ablities to offload foot and scoots right leg rather than stepping. In current condition, pt would benefit from return to short term placement prior to returning home until ambulatory tolerance is improved. will continue to follow.  See \"Rehab Therapy-Acute\" Patient Summary Report for complete documentation.     "

## 2017-06-05 NOTE — RESPIRATORY CARE
COPD EDUCATION by COPD CLINICAL EDUCATOR  6/5/2017 at 12:55 PM by Ashly Peck     Patient reviewed by COPD education team. Patient does not qualify for COPD program.

## 2017-06-05 NOTE — CARE PLAN
Problem: Nutritional:  Goal: Nutrition support tolerated and meeting greater than 85% of estimated needs  Outcome: MET Date Met:  06/05/17  Patient now on a PO diet s/p swallow evaluation.  Tube feeds discontinued.

## 2017-06-06 ENCOUNTER — APPOINTMENT (OUTPATIENT)
Dept: RADIOLOGY | Facility: MEDICAL CENTER | Age: 67
DRG: 871 | End: 2017-06-06
Attending: INTERNAL MEDICINE
Payer: MEDICARE

## 2017-06-06 LAB
ANION GAP SERPL CALC-SCNC: 6 MMOL/L (ref 0–11.9)
BASOPHILS # BLD AUTO: 0.5 % (ref 0–1.8)
BASOPHILS # BLD: 0.04 K/UL (ref 0–0.12)
BUN SERPL-MCNC: 26 MG/DL (ref 8–22)
CALCIUM SERPL-MCNC: 9 MG/DL (ref 8.5–10.5)
CHLORIDE SERPL-SCNC: 102 MMOL/L (ref 96–112)
CO2 SERPL-SCNC: 32 MMOL/L (ref 20–33)
CREAT SERPL-MCNC: 1.09 MG/DL (ref 0.5–1.4)
EOSINOPHIL # BLD AUTO: 0.36 K/UL (ref 0–0.51)
EOSINOPHIL NFR BLD: 4.6 % (ref 0–6.9)
ERYTHROCYTE [DISTWIDTH] IN BLOOD BY AUTOMATED COUNT: 56 FL (ref 35.9–50)
GFR SERPL CREATININE-BSD FRML MDRD: 50 ML/MIN/1.73 M 2
GLUCOSE BLD-MCNC: 102 MG/DL (ref 65–99)
GLUCOSE BLD-MCNC: 115 MG/DL (ref 65–99)
GLUCOSE BLD-MCNC: 154 MG/DL (ref 65–99)
GLUCOSE BLD-MCNC: 85 MG/DL (ref 65–99)
GLUCOSE SERPL-MCNC: 64 MG/DL (ref 65–99)
HCT VFR BLD AUTO: 24.7 % (ref 37–47)
HGB BLD-MCNC: 7.7 G/DL (ref 12–16)
IMM GRANULOCYTES # BLD AUTO: 0.09 K/UL (ref 0–0.11)
IMM GRANULOCYTES NFR BLD AUTO: 1.2 % (ref 0–0.9)
LYMPHOCYTES # BLD AUTO: 1.31 K/UL (ref 1–4.8)
LYMPHOCYTES NFR BLD: 16.8 % (ref 22–41)
MCH RBC QN AUTO: 28.8 PG (ref 27–33)
MCHC RBC AUTO-ENTMCNC: 31.2 G/DL (ref 33.6–35)
MCV RBC AUTO: 92.5 FL (ref 81.4–97.8)
MONOCYTES # BLD AUTO: 0.75 K/UL (ref 0–0.85)
MONOCYTES NFR BLD AUTO: 9.6 % (ref 0–13.4)
NEUTROPHILS # BLD AUTO: 5.24 K/UL (ref 2–7.15)
NEUTROPHILS NFR BLD: 67.3 % (ref 44–72)
NRBC # BLD AUTO: 0 K/UL
NRBC BLD AUTO-RTO: 0 /100 WBC
PLATELET # BLD AUTO: 204 K/UL (ref 164–446)
PMV BLD AUTO: 9.2 FL (ref 9–12.9)
POTASSIUM SERPL-SCNC: 3.8 MMOL/L (ref 3.6–5.5)
RBC # BLD AUTO: 2.67 M/UL (ref 4.2–5.4)
SODIUM SERPL-SCNC: 140 MMOL/L (ref 135–145)
WBC # BLD AUTO: 7.8 K/UL (ref 4.8–10.8)

## 2017-06-06 PROCEDURE — 99232 SBSQ HOSP IP/OBS MODERATE 35: CPT | Performed by: INTERNAL MEDICINE

## 2017-06-06 PROCEDURE — 302255 BARRIER CREAM MOISTURE BAZA PROTECT: Performed by: INTERNAL MEDICINE

## 2017-06-06 PROCEDURE — A9270 NON-COVERED ITEM OR SERVICE: HCPCS | Performed by: HOSPITALIST

## 2017-06-06 PROCEDURE — 80048 BASIC METABOLIC PNL TOTAL CA: CPT

## 2017-06-06 PROCEDURE — A9270 NON-COVERED ITEM OR SERVICE: HCPCS | Performed by: INTERNAL MEDICINE

## 2017-06-06 PROCEDURE — 700102 HCHG RX REV CODE 250 W/ 637 OVERRIDE(OP)

## 2017-06-06 PROCEDURE — 700105 HCHG RX REV CODE 258: Performed by: INTERNAL MEDICINE

## 2017-06-06 PROCEDURE — 85025 COMPLETE CBC W/AUTO DIFF WBC: CPT

## 2017-06-06 PROCEDURE — 700111 HCHG RX REV CODE 636 W/ 250 OVERRIDE (IP): Performed by: INTERNAL MEDICINE

## 2017-06-06 PROCEDURE — 700102 HCHG RX REV CODE 250 W/ 637 OVERRIDE(OP): Performed by: HOSPITALIST

## 2017-06-06 PROCEDURE — 700102 HCHG RX REV CODE 250 W/ 637 OVERRIDE(OP): Performed by: INTERNAL MEDICINE

## 2017-06-06 PROCEDURE — A9270 NON-COVERED ITEM OR SERVICE: HCPCS

## 2017-06-06 PROCEDURE — 82962 GLUCOSE BLOOD TEST: CPT

## 2017-06-06 PROCEDURE — 770006 HCHG ROOM/CARE - MED/SURG/GYN SEMI*

## 2017-06-06 RX ORDER — POTASSIUM CHLORIDE 20 MEQ/1
20 TABLET, EXTENDED RELEASE ORAL 2 TIMES DAILY
Status: DISCONTINUED | OUTPATIENT
Start: 2017-06-06 | End: 2017-06-09 | Stop reason: HOSPADM

## 2017-06-06 RX ORDER — OMEPRAZOLE 20 MG/1
40 CAPSULE, DELAYED RELEASE ORAL DAILY
Status: DISCONTINUED | OUTPATIENT
Start: 2017-06-07 | End: 2017-06-09 | Stop reason: HOSPADM

## 2017-06-06 RX ORDER — TRAZODONE HYDROCHLORIDE 50 MG/1
300 TABLET ORAL
Status: DISCONTINUED | OUTPATIENT
Start: 2017-06-06 | End: 2017-06-09 | Stop reason: HOSPADM

## 2017-06-06 RX ADMIN — OMEPRAZOLE 40 MG: 20 CAPSULE, DELAYED RELEASE ORAL at 08:58

## 2017-06-06 RX ADMIN — TIOTROPIUM BROMIDE 1 CAPSULE: 18 CAPSULE ORAL; RESPIRATORY (INHALATION) at 08:58

## 2017-06-06 RX ADMIN — AMPICILLIN SODIUM AND SULBACTAM SODIUM 3 G: 2; 1 INJECTION, POWDER, FOR SOLUTION INTRAMUSCULAR; INTRAVENOUS at 11:36

## 2017-06-06 RX ADMIN — INSULIN LISPRO 3 UNITS: 100 INJECTION, SOLUTION INTRAVENOUS; SUBCUTANEOUS at 20:09

## 2017-06-06 RX ADMIN — POTASSIUM CHLORIDE 20 MEQ: 1.5 POWDER, FOR SOLUTION ORAL at 08:58

## 2017-06-06 RX ADMIN — AMLODIPINE BESYLATE 10 MG: 10 TABLET ORAL at 08:58

## 2017-06-06 RX ADMIN — CARVEDILOL 3.12 MG: 3.12 TABLET, FILM COATED ORAL at 08:58

## 2017-06-06 RX ADMIN — LOPERAMIDE HYDROCHLORIDE 2 MG: 2 CAPSULE ORAL at 11:34

## 2017-06-06 RX ADMIN — CARVEDILOL 3.12 MG: 3.12 TABLET, FILM COATED ORAL at 17:09

## 2017-06-06 RX ADMIN — AMPICILLIN SODIUM AND SULBACTAM SODIUM 3 G: 2; 1 INJECTION, POWDER, FOR SOLUTION INTRAMUSCULAR; INTRAVENOUS at 01:11

## 2017-06-06 RX ADMIN — FUROSEMIDE 20 MG: 20 TABLET ORAL at 17:08

## 2017-06-06 RX ADMIN — DULOXETINE HYDROCHLORIDE 60 MG: 60 CAPSULE, DELAYED RELEASE ORAL at 08:58

## 2017-06-06 RX ADMIN — MONTELUKAST SODIUM 10 MG: 10 TABLET, FILM COATED ORAL at 20:01

## 2017-06-06 RX ADMIN — OXYCODONE HYDROCHLORIDE 10 MG: 10 TABLET ORAL at 15:01

## 2017-06-06 RX ADMIN — BUDESONIDE AND FORMOTEROL FUMARATE DIHYDRATE 2 PUFF: 160; 4.5 AEROSOL RESPIRATORY (INHALATION) at 20:05

## 2017-06-06 RX ADMIN — BUDESONIDE AND FORMOTEROL FUMARATE DIHYDRATE 2 PUFF: 160; 4.5 AEROSOL RESPIRATORY (INHALATION) at 08:58

## 2017-06-06 RX ADMIN — OXYCODONE HYDROCHLORIDE 10 MG: 10 TABLET ORAL at 03:30

## 2017-06-06 RX ADMIN — ACETAMINOPHEN 650 MG: 325 TABLET, FILM COATED ORAL at 15:18

## 2017-06-06 RX ADMIN — AMPICILLIN SODIUM AND SULBACTAM SODIUM 3 G: 2; 1 INJECTION, POWDER, FOR SOLUTION INTRAMUSCULAR; INTRAVENOUS at 06:10

## 2017-06-06 RX ADMIN — INSULIN GLARGINE 20 UNITS: 100 INJECTION, SOLUTION SUBCUTANEOUS at 20:08

## 2017-06-06 RX ADMIN — HEPARIN SODIUM 5000 UNITS: 5000 INJECTION, SOLUTION INTRAVENOUS; SUBCUTANEOUS at 06:11

## 2017-06-06 RX ADMIN — OXYCODONE HYDROCHLORIDE 10 MG: 10 TABLET ORAL at 20:01

## 2017-06-06 RX ADMIN — TRAZODONE HYDROCHLORIDE 300 MG: 50 TABLET ORAL at 23:17

## 2017-06-06 RX ADMIN — OXYCODONE HYDROCHLORIDE 10 MG: 10 TABLET ORAL at 11:34

## 2017-06-06 RX ADMIN — LEVOTHYROXINE SODIUM 75 MCG: 75 TABLET ORAL at 06:10

## 2017-06-06 RX ADMIN — POTASSIUM CHLORIDE 20 MEQ: 1500 TABLET, EXTENDED RELEASE ORAL at 20:01

## 2017-06-06 RX ADMIN — FUROSEMIDE 20 MG: 20 TABLET ORAL at 06:10

## 2017-06-06 RX ADMIN — AMPICILLIN SODIUM AND SULBACTAM SODIUM 3 G: 2; 1 INJECTION, POWDER, FOR SOLUTION INTRAMUSCULAR; INTRAVENOUS at 17:08

## 2017-06-06 RX ADMIN — HEPARIN SODIUM 5000 UNITS: 5000 INJECTION, SOLUTION INTRAVENOUS; SUBCUTANEOUS at 15:01

## 2017-06-06 RX ADMIN — GABAPENTIN 300 MG: 300 CAPSULE ORAL at 20:01

## 2017-06-06 RX ADMIN — HEPARIN SODIUM 5000 UNITS: 5000 INJECTION, SOLUTION INTRAVENOUS; SUBCUTANEOUS at 23:18

## 2017-06-06 RX ADMIN — AMPICILLIN SODIUM AND SULBACTAM SODIUM 3 G: 2; 1 INJECTION, POWDER, FOR SOLUTION INTRAMUSCULAR; INTRAVENOUS at 23:17

## 2017-06-06 RX ADMIN — OXYCODONE HYDROCHLORIDE 10 MG: 10 TABLET ORAL at 23:31

## 2017-06-06 RX ADMIN — GABAPENTIN 300 MG: 300 CAPSULE ORAL at 08:58

## 2017-06-06 ASSESSMENT — PAIN SCALES - GENERAL
PAINLEVEL_OUTOF10: 3
PAINLEVEL_OUTOF10: 7
PAINLEVEL_OUTOF10: 4
PAINLEVEL_OUTOF10: 7
PAINLEVEL_OUTOF10: 3
PAINLEVEL_OUTOF10: 6

## 2017-06-06 ASSESSMENT — ENCOUNTER SYMPTOMS
HEADACHES: 0
GASTROINTESTINAL NEGATIVE: 1
COUGH: 1
CHILLS: 0
EYES NEGATIVE: 1
SHORTNESS OF BREATH: 1
SPUTUM PRODUCTION: 1
FEVER: 0
WEAKNESS: 1
BACK PAIN: 1
PSYCHIATRIC NEGATIVE: 1

## 2017-06-06 NOTE — WOUND TEAM
In to see pt for wound to L ankle. Pt has surgical scars with some scabs to ankle and heel. All with no s/s of infection. There is 0.5 x 0.5 scab distal to lateral malleolus and there is dark purple area to lateral foot 0.5 x 0.8 cm, not on bony prominence. Left these EFRAÍN.  Applied moisturizer to BLE. Nsg to apply daily. Pt has no advanced wound care needs.

## 2017-06-06 NOTE — HEART FAILURE PROGRAM
"Cardiovascular Nurse Navigator () Progress Note:       Please note, this is a heart failure patient. As of 6/6 plan appears to be to return to SNF for short stay, hence cardiology appt in [place] in July. If pt ends up not going to SNF, she must have an appointment scheduled within 7 days of discharge.   Also, when completing the after visit summary (discharge instructions) please select \"Cardiac Diagnosis, and Heart Failure\" in the special instructions section so that the heart failure discharge instructions will populate.     Please call [facility] Nurse Navigator with any questions: [phone]. Thank you.      "

## 2017-06-06 NOTE — PROGRESS NOTES
Assumed patient care at 0700. Received report from night shift. Assessment completed. POC discussed with pt, verbalizes understanding. A&Ox 4. Pt reports 3/10 pain in left leg and back, declines intervention. Mckeon in place. IJ in right side of neck, patent, dressing CDI. Personal possessions and call light placed within reach. Pt denies any additional needs at this time.

## 2017-06-06 NOTE — PROGRESS NOTES
Received report from day RN. Assumed pt care at 1905, 6/5. Discussed call light/phone system, communication board and POC. Pt is aao x 4 and verbalizes understanding. Pt admitted for COPD exb/PNA. RN administers IV abx as scheduled. Pt performs coughing and deep breathing exercises and needs reinforcement. Pt has 6 L O2 per NC, and baseline at home is 4L. Pt has a RIJ central line in place, patent, CDI. Pt has a sykes in place from ICU, pt refusing to d/c it, will pass on to day shift. Pt had recent ORIF to L ankle. Pt has a bo boot at bedside for transfers. Pt reports generalized pain. RN administers PO oxy PRN per MAR. RN observes possible DTI to L lateral foot and heel. RN places wound consult. Pt mobility assessed. Pt is a one assist with a FWW and uses a wheelchair at home. Fall precautions in place. Bed is locked and in low position, call light within reach. Treaded slippers in place. Pt needs met at this time. Hourly rounding in place.

## 2017-06-06 NOTE — DISCHARGE PLANNING
Care Transition Team Assessment    IHD met with patient bedside. She admitted from [facility] and expects to either return there or go home with home health. She expressed interest in getting a walker, reacher, shower chair and commode for when she goes home. She also stated her PCP had moved, so IHD contacted the schedulers on her behalf.     Information Source  Orientation : Oriented x 4  Information Given By: Patient  Informant's Name: Priya  Who is responsible for making decisions for patient? : Patient         Elopement Risk  Legal Hold: No  Ambulatory or Self Mobile in Wheelchair: No-Not an Elopement Risk  Elopement Risk: Not at Risk for Elopement    Interdisciplinary Discharge Planning  Does Admitting Nurse Feel This Could be a Complex Discharge?: No  Primary Care Physician: None  (LM with schedulers)  Lives with - Patient's Self Care Capacity: Alone and Able to Care For Self  Patient or legal guardian wants to designate a caregiver (see row info): No  Support Systems: Family Member(s), Friends / Neighbors  Housing / Facility: Skilled Nursing Facility  [facility] Care Facility[facility]  Do You Take your Prescribed Medications Regularly: Yes  Able to Return to Previous ADL's: Yes  Mobility Issues: Yes  Prior Services: Home-Independent, Continuous (24 Hour) Care Giving Per Service  Patient Expects to be Discharged to:: SNF  Assistance Needed: No  Durable Medical Equipment: Not Applicable    Discharge Preparedness  What is your plan after discharge?: Skilled nursing facility, Home health care  What are your discharge supports?: Other (comment), Sibling (Neighbor)  Prior Functional Level: Ambulatory, Drives Self, Independent with Activities of Daily Living, Independent with Medication Management  Difficulity with ADLs: Walking  Difficulty with ADLs Comment: Wearing boot for foot fracture  Difficulity with IADLs: Driving  Difficulity with IADL Comments: Wearing boot for foot  fracture    Functional Assesment  Prior Functional Level: Ambulatory, Drives Self, Independent with Activities of Daily Living, Independent with Medication Management    Finances  Financial Barriers to Discharge: No  Prescription Coverage: Yes ([employer] )    Vision / Hearing Impairment  Vision Impairment : Yes  Right Eye Vision: Impaired, Wears Glasses  Left Eye Vision: Impaired, Wears Glasses  Hearing Impairment : No    Values / Beliefs / Concerns  Values / Beliefs Concerns : No         Domestic Abuse  Have you ever been the victim of abuse or violence?: No  Physical Abuse or Sexual Abuse: No  Verbal Abuse or Emotional Abuse: No  Possible Abuse Reported to:: Not Applicable    Psychological Assessment  History of Substance Abuse: None  History of Psychiatric Problems: No  Non-compliant with Treatment: No  Newly Diagnosed Illness: No    Discharge Risks or Barriers  Discharge risks or barriers?: No  Patient risk factors: Readmission    Anticipated Discharge Information  Anticipated discharge disposition: Discharge needs currently unknown, [facility], [facility]  Discharge Address: [address] [place] [address]  Discharge Contact Phone Number: [phone]

## 2017-06-06 NOTE — PROGRESS NOTES
[facility] Hospitalist Progress Note    Date of Service: 2017    Chief Complaint  66 y.o. female admitted 2017 with resp. Failure, admitted from SNF, Pt with recent hospitalization, ankle Fx, UTI, chr. Obesity, COPD and h/o tobacco abuse    Interval Problem Update    Pulmonary edema-she feels that her breathing is slowly improving. Does not like her CPAP machine at home. Diuresing well currently.     L ankle fracture-healing well. PT evaluated the patient and recommends going back to SNF again.     Consultants/Specialty  Pulm,CC    Disposition  Back to short-term SNF        Review of Systems   Constitutional: Positive for malaise/fatigue. Negative for fever and chills.        All slowly improving     Eyes: Negative.    Respiratory: Positive for cough, sputum production and shortness of breath.         Substantially improved   Cardiovascular: Positive for leg swelling (improving). Negative for chest pain.   Gastrointestinal: Negative.    Genitourinary: Negative.    Musculoskeletal: Positive for back pain and joint pain.        At her baseline, chronic   Skin: Negative for rash.   Neurological: Positive for weakness. Negative for headaches.   Endo/Heme/Allergies: Negative.    Psychiatric/Behavioral: Negative.    All other systems reviewed and are negative.     Physical Exam  Laboratory/Imaging   Hemodynamics  Temp (24hrs), Av.6 °C (97.9 °F), Min:36.3 °C (97.4 °F), Max:37.1 °C (98.7 °F)   Temperature: 36.3 °C (97.4 °F)  Pulse  Av.9  Min: 49  Max: 98 Heart Rate (Monitored): (!) 55  Blood Pressure : 138/55 mmHg     Respiratory      Respiration: 18, Pulse Oximetry: 98 %     Work Of Breathing / Effort: Mild  RUL Breath Sounds: Expiratory Wheezes, RML Breath Sounds: Diminished, RLL Breath Sounds: Diminished, BIRD Breath Sounds: Expiratory Wheezes, LLL Breath Sounds: Diminished    Fluids    Intake/Output Summary (Last 24 hours) at 17 1415  Last data filed at 17 1300   Gross per 24 hour   Intake    750  ml   Output   5600 ml   Net  -4850 ml       Nutrition  Orders Placed This Encounter   Procedures   • DIET ORDER     Standing Status: Standing      Number of Occurrences: 1      Standing Expiration Date:      Order Specific Question:  Diet:     Answer:  Diabetic [3]     Order Specific Question:  Texture/Fiber modifications:     Answer:  Dysphagia 3(Mechanical Soft)specify fluid consistency(question 6) [3]      Comments:  no rice     Order Specific Question:  Consistency/Fluid modifications:     Answer:  Thin Liquids [3]     Physical Exam   Constitutional: She is oriented to person, place, and time. She appears well-developed and well-nourished.   HENT:   Head: Normocephalic and atraumatic.   Nose: Nose normal.   Mouth/Throat: Oropharynx is clear and moist.   Eyes: Conjunctivae and EOM are normal. Pupils are equal, round, and reactive to light. Right eye exhibits no discharge. Left eye exhibits no discharge.   Neck: Normal range of motion. Neck supple.   Cardiovascular: Normal rate, regular rhythm and normal heart sounds.  Exam reveals no gallop and no friction rub.    Pulmonary/Chest: Effort normal. No stridor. She has decreased breath sounds. She has no wheezes. She has rhonchi. She has rales.   Abdominal: Soft. Bowel sounds are normal. There is no tenderness.   Musculoskeletal: Normal range of motion. She exhibits edema. She exhibits no tenderness.   Neurological: She is alert and oriented to person, place, and time. No cranial nerve deficit.   Skin: Skin is warm and dry. She is not diaphoretic.   echymosis  LE ankle incision healing  Sutures removed  No changes appreciated today   Psychiatric: She has a normal mood and affect. Her behavior is normal.   Nursing note and vitals reviewed.      Recent Labs      06/04/17   0535  06/05/17   0320  06/06/17   0615   WBC  11.4*  9.2  7.8   RBC  2.53*  2.48*  2.67*   HEMOGLOBIN  7.3*  7.0*  7.7*   HEMATOCRIT  24.1*  23.2*  24.7*   MCV  95.3  93.5  92.5   MCH  28.9  28.2   28.8   MCHC  30.3*  30.2*  31.2*   RDW  59.7*  58.4*  56.0*   PLATELETCT  173  179  204   MPV  9.7  9.3  9.2     Recent Labs      06/04/17   0535  06/05/17   0320  06/06/17   0615   SODIUM  144  138  140   POTASSIUM  3.7  3.3*  3.8   CHLORIDE  112  104  102   CO2  24  26  32   GLUCOSE  165*  66  64*   BUN  26*  27*  26*   CREATININE  0.95  1.10  1.09   CALCIUM  8.4*  8.7  9.0         Recent Labs      06/04/17   0535  06/05/17   0320   BNPBTYPENAT  423*  552*              Assessment/Plan     COPD (chronic obstructive pulmonary disease) (CMS-HCC) (present on admission)  Assessment & Plan  Currently no wheezing, continues to improve  On RT , off steroids now  -PULM is following    Hypertensive heart disease with heart failure (CMS-HCC) (present on admission)  Assessment & Plan  Follow, avoid fluid OL  On lasix, switched to PO today, net negative 3.1 liters for admission     Acute on chronic respiratory failure with hypoxia and hypercapnia (CMS-HCC) (present on admission)  Assessment & Plan  VDRF, pt extubated, slowly doing better with forced diuresis and IV abx's  -needs to be compliant with her outpatient CPAP    HCAP (healthcare-associated pneumonia) (present on admission)  Assessment & Plan  -continue IV unasyn, tolerating well, all cx's NGTD    Sepsis ([facility]) (present on admission)  Assessment & Plan  resolved    DM type 2, uncontrolled, with renal complications (CMS-HCC) (present on admission)  Assessment & Plan  Tight control ISS, adjust PRN, well controlled currently    CKD (chronic kidney disease), stage II (present on admission)  Assessment & Plan  -resolved, follow Cr level    Chronic pain syndrome (present on admission)  Assessment & Plan  Judicious Rx    Tobacco abuse disorder (present on admission)  Assessment & Plan  [facility]. Quit in 2/17    Anemia of chronic disease (present on admission)  Assessment & Plan  -unclear cause, stable for now, likely needs outpatient EGD/COLO  Negative recent w/u, f/u am  labs    Morbid obesity (CMS-HCC) (present on admission)  Assessment & Plan  Chronic, complicating     Hypothyroidism (present on admission)  Assessment & Plan  On rx    EDITH (obstructive sleep apnea) (present on admission)  Assessment & Plan  H/o, needs to be compliant with her CPAP      Labs reviewed and Medications reviewed  Mckeon catheter: Critically Ill - Requiring Accurate Measurement of Urinary Output      DVT Prophylaxis: Heparin (watch closely)        Assessed for rehab: Patient was assess for and/or received rehabilitation services during this hospitalization

## 2017-06-07 ENCOUNTER — APPOINTMENT (OUTPATIENT)
Dept: RADIOLOGY | Facility: MEDICAL CENTER | Age: 67
DRG: 871 | End: 2017-06-07
Attending: INTERNAL MEDICINE
Payer: MEDICARE

## 2017-06-07 LAB
ANION GAP SERPL CALC-SCNC: 7 MMOL/L (ref 0–11.9)
BACTERIA BLD CULT: NORMAL
BASOPHILS # BLD AUTO: 0.7 % (ref 0–1.8)
BASOPHILS # BLD: 0.05 K/UL (ref 0–0.12)
BUN SERPL-MCNC: 22 MG/DL (ref 8–22)
CALCIUM SERPL-MCNC: 9 MG/DL (ref 8.5–10.5)
CHLORIDE SERPL-SCNC: 102 MMOL/L (ref 96–112)
CO2 SERPL-SCNC: 30 MMOL/L (ref 20–33)
CREAT SERPL-MCNC: 1.25 MG/DL (ref 0.5–1.4)
EOSINOPHIL # BLD AUTO: 0.29 K/UL (ref 0–0.51)
EOSINOPHIL NFR BLD: 3.8 % (ref 0–6.9)
ERYTHROCYTE [DISTWIDTH] IN BLOOD BY AUTOMATED COUNT: 55.5 FL (ref 35.9–50)
GFR SERPL CREATININE-BSD FRML MDRD: 43 ML/MIN/1.73 M 2
GLUCOSE BLD-MCNC: 117 MG/DL (ref 65–99)
GLUCOSE BLD-MCNC: 149 MG/DL (ref 65–99)
GLUCOSE BLD-MCNC: 174 MG/DL (ref 65–99)
GLUCOSE BLD-MCNC: 230 MG/DL (ref 65–99)
GLUCOSE SERPL-MCNC: 99 MG/DL (ref 65–99)
HCT VFR BLD AUTO: 24.3 % (ref 37–47)
HGB BLD-MCNC: 7.5 G/DL (ref 12–16)
IMM GRANULOCYTES # BLD AUTO: 0.15 K/UL (ref 0–0.11)
IMM GRANULOCYTES NFR BLD AUTO: 2 % (ref 0–0.9)
LYMPHOCYTES # BLD AUTO: 1.17 K/UL (ref 1–4.8)
LYMPHOCYTES NFR BLD: 15.3 % (ref 22–41)
MCH RBC QN AUTO: 28.6 PG (ref 27–33)
MCHC RBC AUTO-ENTMCNC: 30.9 G/DL (ref 33.6–35)
MCV RBC AUTO: 92.7 FL (ref 81.4–97.8)
MONOCYTES # BLD AUTO: 0.68 K/UL (ref 0–0.85)
MONOCYTES NFR BLD AUTO: 8.9 % (ref 0–13.4)
NEUTROPHILS # BLD AUTO: 5.32 K/UL (ref 2–7.15)
NEUTROPHILS NFR BLD: 69.3 % (ref 44–72)
NRBC # BLD AUTO: 0.02 K/UL
NRBC BLD AUTO-RTO: 0.3 /100 WBC
PLATELET # BLD AUTO: 190 K/UL (ref 164–446)
PMV BLD AUTO: 9.3 FL (ref 9–12.9)
POTASSIUM SERPL-SCNC: 4 MMOL/L (ref 3.6–5.5)
RBC # BLD AUTO: 2.62 M/UL (ref 4.2–5.4)
SIGNIFICANT IND 70042: NORMAL
SITE SITE: NORMAL
SODIUM SERPL-SCNC: 139 MMOL/L (ref 135–145)
SOURCE SOURCE: NORMAL
WBC # BLD AUTO: 7.7 K/UL (ref 4.8–10.8)

## 2017-06-07 PROCEDURE — 700102 HCHG RX REV CODE 250 W/ 637 OVERRIDE(OP): Performed by: HOSPITALIST

## 2017-06-07 PROCEDURE — 700102 HCHG RX REV CODE 250 W/ 637 OVERRIDE(OP)

## 2017-06-07 PROCEDURE — 99232 SBSQ HOSP IP/OBS MODERATE 35: CPT | Performed by: INTERNAL MEDICINE

## 2017-06-07 PROCEDURE — 700111 HCHG RX REV CODE 636 W/ 250 OVERRIDE (IP): Performed by: INTERNAL MEDICINE

## 2017-06-07 PROCEDURE — 82962 GLUCOSE BLOOD TEST: CPT

## 2017-06-07 PROCEDURE — A9270 NON-COVERED ITEM OR SERVICE: HCPCS | Performed by: INTERNAL MEDICINE

## 2017-06-07 PROCEDURE — 700102 HCHG RX REV CODE 250 W/ 637 OVERRIDE(OP): Performed by: INTERNAL MEDICINE

## 2017-06-07 PROCEDURE — 85025 COMPLETE CBC W/AUTO DIFF WBC: CPT

## 2017-06-07 PROCEDURE — 97535 SELF CARE MNGMENT TRAINING: CPT

## 2017-06-07 PROCEDURE — A9270 NON-COVERED ITEM OR SERVICE: HCPCS

## 2017-06-07 PROCEDURE — 80048 BASIC METABOLIC PNL TOTAL CA: CPT

## 2017-06-07 PROCEDURE — 770006 HCHG ROOM/CARE - MED/SURG/GYN SEMI*

## 2017-06-07 PROCEDURE — 51798 US URINE CAPACITY MEASURE: CPT

## 2017-06-07 PROCEDURE — 97530 THERAPEUTIC ACTIVITIES: CPT

## 2017-06-07 PROCEDURE — A9270 NON-COVERED ITEM OR SERVICE: HCPCS | Performed by: HOSPITALIST

## 2017-06-07 PROCEDURE — 700105 HCHG RX REV CODE 258: Performed by: INTERNAL MEDICINE

## 2017-06-07 RX ORDER — LOPERAMIDE HYDROCHLORIDE 2 MG/1
2 CAPSULE ORAL 4 TIMES DAILY PRN
Status: DISCONTINUED | OUTPATIENT
Start: 2017-06-07 | End: 2017-06-09 | Stop reason: HOSPADM

## 2017-06-07 RX ADMIN — AMLODIPINE BESYLATE 10 MG: 10 TABLET ORAL at 08:27

## 2017-06-07 RX ADMIN — OXYCODONE HYDROCHLORIDE 10 MG: 10 TABLET ORAL at 08:43

## 2017-06-07 RX ADMIN — BUDESONIDE AND FORMOTEROL FUMARATE DIHYDRATE 2 PUFF: 160; 4.5 AEROSOL RESPIRATORY (INHALATION) at 08:26

## 2017-06-07 RX ADMIN — OXYCODONE HYDROCHLORIDE 10 MG: 10 TABLET ORAL at 13:54

## 2017-06-07 RX ADMIN — OXYCODONE HYDROCHLORIDE 10 MG: 10 TABLET ORAL at 22:22

## 2017-06-07 RX ADMIN — DULOXETINE HYDROCHLORIDE 60 MG: 60 CAPSULE, DELAYED RELEASE ORAL at 08:26

## 2017-06-07 RX ADMIN — INSULIN LISPRO 3 UNITS: 100 INJECTION, SOLUTION INTRAVENOUS; SUBCUTANEOUS at 13:04

## 2017-06-07 RX ADMIN — GABAPENTIN 300 MG: 300 CAPSULE ORAL at 08:27

## 2017-06-07 RX ADMIN — OXYCODONE HYDROCHLORIDE 10 MG: 10 TABLET ORAL at 04:07

## 2017-06-07 RX ADMIN — INSULIN LISPRO 4 UNITS: 100 INJECTION, SOLUTION INTRAVENOUS; SUBCUTANEOUS at 21:28

## 2017-06-07 RX ADMIN — POTASSIUM CHLORIDE 20 MEQ: 1500 TABLET, EXTENDED RELEASE ORAL at 21:15

## 2017-06-07 RX ADMIN — OXYCODONE HYDROCHLORIDE 10 MG: 10 TABLET ORAL at 17:42

## 2017-06-07 RX ADMIN — HEPARIN SODIUM 5000 UNITS: 5000 INJECTION, SOLUTION INTRAVENOUS; SUBCUTANEOUS at 21:29

## 2017-06-07 RX ADMIN — AMPICILLIN SODIUM AND SULBACTAM SODIUM 3 G: 2; 1 INJECTION, POWDER, FOR SOLUTION INTRAMUSCULAR; INTRAVENOUS at 17:01

## 2017-06-07 RX ADMIN — OMEPRAZOLE 40 MG: 20 CAPSULE, DELAYED RELEASE ORAL at 08:26

## 2017-06-07 RX ADMIN — AMPICILLIN SODIUM AND SULBACTAM SODIUM 3 G: 2; 1 INJECTION, POWDER, FOR SOLUTION INTRAMUSCULAR; INTRAVENOUS at 06:35

## 2017-06-07 RX ADMIN — GABAPENTIN 300 MG: 300 CAPSULE ORAL at 21:15

## 2017-06-07 RX ADMIN — TRAZODONE HYDROCHLORIDE 300 MG: 50 TABLET ORAL at 23:36

## 2017-06-07 RX ADMIN — MONTELUKAST SODIUM 10 MG: 10 TABLET, FILM COATED ORAL at 21:15

## 2017-06-07 RX ADMIN — TIOTROPIUM BROMIDE 1 CAPSULE: 18 CAPSULE ORAL; RESPIRATORY (INHALATION) at 08:26

## 2017-06-07 RX ADMIN — HEPARIN SODIUM 5000 UNITS: 5000 INJECTION, SOLUTION INTRAVENOUS; SUBCUTANEOUS at 13:11

## 2017-06-07 RX ADMIN — AMPICILLIN SODIUM AND SULBACTAM SODIUM 3 G: 2; 1 INJECTION, POWDER, FOR SOLUTION INTRAMUSCULAR; INTRAVENOUS at 13:07

## 2017-06-07 RX ADMIN — INSULIN GLARGINE 20 UNITS: 100 INJECTION, SOLUTION SUBCUTANEOUS at 21:27

## 2017-06-07 RX ADMIN — AMPICILLIN SODIUM AND SULBACTAM SODIUM 3 G: 2; 1 INJECTION, POWDER, FOR SOLUTION INTRAMUSCULAR; INTRAVENOUS at 23:37

## 2017-06-07 RX ADMIN — LOPERAMIDE HYDROCHLORIDE 2 MG: 2 CAPSULE ORAL at 13:54

## 2017-06-07 RX ADMIN — FUROSEMIDE 20 MG: 20 TABLET ORAL at 06:35

## 2017-06-07 RX ADMIN — FUROSEMIDE 20 MG: 20 TABLET ORAL at 17:01

## 2017-06-07 RX ADMIN — BUDESONIDE AND FORMOTEROL FUMARATE DIHYDRATE 2 PUFF: 160; 4.5 AEROSOL RESPIRATORY (INHALATION) at 21:17

## 2017-06-07 RX ADMIN — CARVEDILOL 3.12 MG: 3.12 TABLET, FILM COATED ORAL at 08:27

## 2017-06-07 RX ADMIN — POTASSIUM CHLORIDE 20 MEQ: 1500 TABLET, EXTENDED RELEASE ORAL at 08:27

## 2017-06-07 RX ADMIN — HEPARIN SODIUM 5000 UNITS: 5000 INJECTION, SOLUTION INTRAVENOUS; SUBCUTANEOUS at 06:35

## 2017-06-07 RX ADMIN — LEVOTHYROXINE SODIUM 75 MCG: 75 TABLET ORAL at 06:35

## 2017-06-07 ASSESSMENT — PAIN SCALES - GENERAL
PAINLEVEL_OUTOF10: 6
PAINLEVEL_OUTOF10: 6
PAINLEVEL_OUTOF10: 3
PAINLEVEL_OUTOF10: 7
PAINLEVEL_OUTOF10: 7
PAINLEVEL_OUTOF10: 4
PAINLEVEL_OUTOF10: 6
PAINLEVEL_OUTOF10: 6

## 2017-06-07 ASSESSMENT — COGNITIVE AND FUNCTIONAL STATUS - GENERAL
SUGGESTED CMS G CODE MODIFIER DAILY ACTIVITY: CK
DAILY ACTIVITIY SCORE: 17
PERSONAL GROOMING: A LITTLE
DRESSING REGULAR LOWER BODY CLOTHING: A LITTLE
DRESSING REGULAR UPPER BODY CLOTHING: A LITTLE
HELP NEEDED FOR BATHING: A LITTLE
TOILETING: TOTAL

## 2017-06-07 ASSESSMENT — ENCOUNTER SYMPTOMS
WEAKNESS: 1
NAUSEA: 0
BACK PAIN: 1
FEVER: 0
SPUTUM PRODUCTION: 0
PSYCHIATRIC NEGATIVE: 1
SHORTNESS OF BREATH: 1
ABDOMINAL PAIN: 0
EYES NEGATIVE: 1
VOMITING: 0
HEADACHES: 0
COUGH: 1

## 2017-06-07 NOTE — PROGRESS NOTES
Pulmonary Critical Care Progress Note      Date of Service: 6/62017    Chief Complaint: Worsening shortness of breath and altered mental status    History of Present Illness:  Please notes that the patient was sedated and intubated prior to obtaining history and all history was obtained from old chart records as well as the ER history physician, Dr. Bae.  The patient is a 66-year-old female who was receiving rehabilitation at the [facility] after undergoing an ankle fracture several weeks ago, who developed urinary tract infection symptoms several days ago and was being treated for a UTI.  Apparently, she had fevers up to 102.  However, over the course of the evening, the patient started having worsening shortness of breath and altered mental status.  Due to worsening symptoms of shortness of breath as well as continued altered mental status, it was decided to send the patient to the emergency department.  Upon arrival to the emergency department, the patient continued to be unresponsive and the decision to intubate was made.  Once the patient was intubated and a central line was    placed.  The patient immediately had drops in her blood pressure into the 60s and 70s and based on her chest x-ray, they thought that perhaps she had healthcare-acquired pneumonia and was started on the sepsis protocol with 4 liters of normal saline boluses as well as vasopressor therapy.    ROS:  Unable to obtain as the patient is intubated and sedated    Interval Events:  24 hour interval history reviewed   Doing well. Almost at baseline in term of breathing     PFSH:  No change.    Physical Exam:  General:  Alert, oriented.   HEENT: Normocephalic, atraumatic, PERRL.   CVS:  Bradycardic, regular rhythm.  Respiratory:  Diminished breath sounds.   Abdomen:  Soft.  Extremities:  Without edema. Left leg with walking boot.  Neuro/Psych:  Normal affect and behavior.     Respiratory:     Pulse Oximetry: 92  %  ImagingAvailable data reviewed   CXR reviewed with team        Invalid input(s): [id number]  HemoDynamics:  Pulse: 60  Blood Pressure : 128/41 mmHg    Imaging: Available data reviewed     Recent Labs      06/04/17   0535  06/05/17   0320   BNPBTYPENAT  423*  552*     Neuro:  GCS    Imaging: Available data reviewed    Fluids:  Intake/Output       06/04/17 0700 - 06/05/17 0659 06/05/17 0700 - 06/06/17 0659 06/06/17 0700 - 06/07/17 0659      8207-8722 0111-0946 Total 0700-1859 5138-9868 Total 2595-1800 2194-5806 Total       Intake    P.O.  540  960 1500  800  -- 800  480  -- 480    P.O.  800 -- 800 480 -- 480    I.V.  532  550 1082  200  220 420  --  -- --    IV Volume (< ns >) 332 -- 332 -- -- -- -- -- --    IV Piggyback Volume (IV Piggyback) 200 550 750 200 220 420 -- -- --    Other  100  -- 100  --  -- --  --  -- --    Medications (P.O./ Enteral Liquids) 100 -- 100 -- -- -- -- -- --    Enteral  500  -- 500  --  -- --  --  -- --    Enteral Volume 500 -- 500 -- -- -- -- -- --    Total Intake 1672 1510 3182 8052 853 7896 480 -- 480       Output    Urine  3225  2810 6035  2225  3000 5225  2700  1200 3900    Indwelling Cathether 3225 2810 6035 2225 3000 5225 2700 1200 3900    Drains  0  -- 0  --  -- --  --  -- --    Residual Amount (ml) (Discarded) 0 -- 0 -- -- -- -- -- --    Stool  --  -- --  --  -- --  --  -- --    Number of Times Stooled 1 x -- 1 x 2 x 5 x 7 x 3 x -- 3 x    Total Output 3225 2810 6035 2225 3000 5225 2700 1200 3900       Net I/O     -1553 -9831 -1713 -1225 -2780 -4005 -2220 -1200 -3420           Recent Labs      06/04/17   0535  06/05/17   0320  06/06/17   0615   SODIUM  144  138  140   POTASSIUM  3.7  3.3*  3.8   CHLORIDE  112  104  102   CO2  24  26  32   BUN  26*  27*  26*   CREATININE  0.95  1.10  1.09   MAGNESIUM  1.9  1.7   --    PHOSPHORUS  3.5  3.3   --    CALCIUM  8.4*  8.7  9.0     GI/Nutrition:  Imaging: Available data reviewed  NPO     Liver Function:  Recent Labs       17   0535  17   0320  1715   ALTSGPT  12  10   --    ASTSGOT  12  11*   --    ALKPHOSPHAT  44  41   --    TBILIRUBIN  0.6  0.9   --    PREALBUMIN   --   11.0*   --    GLUCOSE  165*  66  64*     Heme:  Recent Labs      17   0535  17   03217   RBC  2.53*  2.48*  2.67*   HEMOGLOBIN  7.3*  7.0*  7.7*   HEMATOCRIT  24.1*  23.2*  24.7*   PLATELETCT  173  179  204     Infectious Disease:  Temp  Av.4 °C (97.6 °F)  Min: 36.2 °C (97.1 °F)  Max: 36.8 °C (98.3 °F)  Micro: reviewed   Recent Labs      17   WBC  11.4*  9.2  7.8   NEUTSPOLYS  87.50*  72.70*  67.30   LYMPHOCYTES  6.90*  14.40*  16.80*   MONOCYTES  3.90  8.50  9.60   EOSINOPHILS  0.80  3.30  4.60   BASOPHILS  0.30  0.40  0.50   ASTSGOT  12  11*   --    ALTSGPT  12  10   --    ALKPHOSPHAT  44  41   --    TBILIRUBIN  0.6  0.9   --      Current Facility-Administered Medications   Medication Dose Frequency Provider Last Rate Last Dose   • [START ON 2017] omeprazole (PRILOSEC) capsule 40 mg  40 mg DAILY Lucian Fung M.D.       • potassium chloride SA (Kdur) tablet 20 mEq  20 mEq BID Lucian Fung M.D.   20 mEq at 17   • trazodone (DESYREL) tablet 300 mg  300 mg QHS Roger Ahmadi M.D.       • ampicillin/sulbactam (UNASYN) 3 g in  mL IVPB  3 g Q6HRS William Hope M.D.   Stopped at 17 1738   • amlodipine (NORVASC) tablet 10 mg  10 mg Q DAY William Hope M.D.   10 mg at 17 0858   • carvedilol (COREG) tablet 3.125 mg  3.125 mg BID WITH MEALS William Hope M.D.   3.125 mg at 17 1709   • furosemide (LASIX) tablet 20 mg  20 mg BID DIURETIC Larry Cordero M.D.   20 mg at 17 1708   • insulin lispro (HUMALOG) injection 3-14 Units  3-14 Units 4X/DAY ACHS Larry Cordero M.D.   3 Units at 17   • levothyroxine (SYNTHROID) tablet 75 mcg  75 mcg AM ES Juliocesar Ramon, PHARMD   75 mcg at 17 0610    • albuterol inhaler 2 Puff  2 Puff Q4HRS PRN Sierra Vela M.D.   2 Puff at 06/04/17 1234   • budesonide-formoterol (SYMBICORT) 160-4.5 MCG/ACT inhaler 2 Puff  2 Puff BID Sierra Vela M.D.   2 Puff at 06/06/17 2005   • tiotropium (SPIRIVA) 18 MCG inhalation capsule 1 Cap  1 Cap DAILY Sierra Vela M.D.   1 Cap at 06/06/17 0858   • Respiratory Care per Protocol   Continuous RT Sierra Vela M.D.       • heparin injection 5,000 Units  5,000 Units Q8HRS Sierra Vela M.D.   5,000 Units at 06/06/17 1501   • duloxetine (CYMBALTA) capsule 60 mg  60 mg DAILY GHULAM LojaO.   60 mg at 06/06/17 0858   • gabapentin (NEURONTIN) capsule 300 mg  300 mg BID GHULAM LojaO.   300 mg at 06/06/17 2001   • insulin glargine (LANTUS) injection 20 Units  20 Units Nightly GHULAM LojaOMelonie   20 Units at 06/06/17 2008   • montelukast (SINGULAIR) tablet 10 mg  10 mg Q EVENING GHULAM LojaO.   10 mg at 06/06/17 2001   • senna-docusate (PERICOLACE or SENOKOT S) 8.6-50 MG per tablet 2 Tab  2 Tab BID GHULAM LojaO.   Stopped at 06/04/17 2100    And   • polyethylene glycol/lytes (MIRALAX) PACKET 1 Packet  1 Packet QDAY PRN Angel Huynh D.O.        And   • magnesium hydroxide (MILK OF MAGNESIA) suspension 30 mL  30 mL QDAY PRN Angel Huynh D.O.        And   • bisacodyl (DULCOLAX) suppository 10 mg  10 mg QDAY PRN GHULAM LojaO.       • acetaminophen (TYLENOL) tablet 650 mg  650 mg Q6HRS PRN GHULAM LojaO.   650 mg at 06/06/17 1518   • glucose 4 g chewable tablet 16 g  16 g Q15 MIN PRN GHULAM LojaO.        And   • dextrose 50% (D50W) injection 25 mL  25 mL Q15 MIN PRN Angel Huynh D.O.       • ondansetron (ZOFRAN) syringe/vial injection 4 mg  4 mg Q4HRS PRN GHULAM LojaO.       • ondansetron (ZOFRAN ODT) dispertab 4 mg  4 mg Q4HRS PRN Angel Huynh D.O.       • oxycodone immediate release (ROXICODONE) tablet 10 mg  10 mg Q3HRS PRN GHULAM LojaO.   10 mg at 06/06/17 2001    • acetaminophen (TYLENOL) suppository 650 mg  650 mg Q6HRS PRN Angel Huynh, DMelonieOMelonie   650 mg at 06/02/17 1255     Last reviewed on 6/5/2017 11:55 AM by Fermin Michael Ass't    Quality  Measures:  Labs reviewed, Medications reviewed, Radiology images reviewed and EKG reviewed  Mckeon catheter: Critically Ill - Requiring Accurate Measurement of Urinary Output  Central line in place: Need for access, Vasopressors, Sepsis and Shock    DVT Prophylaxis: Heparin  DVT prophylaxis - mechanical: SCDs  Ulcer prophylaxis: Yes  Antibiotics: Treating active infection/contamination beyond 24 hours perioperative coverage  Assessed for rehab: Patient was assess for and/or received rehabilitation services during this hospitalization    Lines:  Right IJ 6/2    Gtts:  None    Abx:  Day 4 Vancomycin  Day 4 Zosyn     Ucx 6/2 negative  Bcx 6/2 negative  Scx 6/2 negative     Echo 3/16 EF 60%, RVSP 60.   LE Doppler 6/4 (-) for dvt    Problems/Plan:  Acute hypoxic respiratory failure.   - extubated on 6/3   - cont RT/O2 protocols   - diuresing well   - encourage IS/PEP  Severe sepsis with septic shock, likely due to pulmonary source.   - s/p sepsis protocol   - lactates normalized after IVF   - de escalate abx   - follow cultures  Healthcare-acquired pneumonia.   - follow cultures   - change abx to unasyn    Con O2, BDs. Steroids and abx

## 2017-06-07 NOTE — PROGRESS NOTES
Received report from day RN. Assumed pt care at 1920, 6/6. Discussed call light/phone system, communication board and POC. Pt is aao x 4 and verbalizes understanding. Pt admitted for COPD exb/PNA. RN administers IV abx as scheduled. Pt performs coughing and deep breathing exercises and needs reinforcement. Pt has 6 L O2 per NC, and baseline at home is 4L. Pt has a RIJ central line in place, patent, RN changes dressing d/t pt scratching at the area. Pt has a sykes in place from ICU, pt continues to refuse to d/c it. Pt had recent ORIF to L ankle. Pt has a bo boot at bedside for transfers. Pt reports generalized pain. RN administers PO oxy PRN per MAR. Pt mobility assessed. Pt is a one assist with a FWW and uses a wheelchair at home. Pt refusing mobility for this RN. Pt refusing bed alarm despite education regarding fall risk. Fall precautions in place. Bed is locked and in low position, call light within reach. Treaded slippers in place. Pt needs met at this time. Hourly rounding in place.

## 2017-06-07 NOTE — PROGRESS NOTES
Assumed patient care at 0700. Received report from night shift. Assessment completed. POC discussed with pt, verbalizes understanding. A&Ox 4. Pt reports 6/10 pain in left leg and back, medicated per MAR. Mckeon in place, good output. IJ in right side of neck, patent, dressing CDI. Left leg floated on pillows. Pt has been experiencing incontinence episdoes, HERMAN cream in use. Pt is one assist, bo boot at bedside for ambulation. Pt refusing bed alarm despite education, calls appropriately.  Personal possessions and call light placed within reach. Pt denies any additional needs at this time.

## 2017-06-07 NOTE — PROGRESS NOTES
[facility] Hospitalist Progress Note    Date of Service: 2017    Chief Complaint  66 y.o. female admitted 2017 with resp. Failure, admitted from SNF, Pt with recent hospitalization, ankle Fx, UTI, chr. Obesity, COPD and h/o tobacco abuse    Interval Problem Update    Pulmonary edema-continues to feel that breathing is improving. Urinating well. No change in o2 requirements. Remove sykes today.    L ankle fracture-healing well. PT evaluated the patient and recommends going back to SNF again. Patient is aware of this plan. Pain is well controlled.    Consultants/Specialty  Pulm,CC    Disposition  Back to short-term SNF, hopefully by tomorrow.        Review of Systems   Constitutional: Negative for fever and malaise/fatigue.        All slowly improving     Eyes: Negative.    Respiratory: Positive for cough and shortness of breath. Negative for sputum production.         More improvement today   Cardiovascular: Positive for leg swelling (about the same today). Negative for chest pain.   Gastrointestinal: Negative for nausea, vomiting and abdominal pain.   Genitourinary: Negative.    Musculoskeletal: Positive for back pain and joint pain.        At her baseline, chronic   Skin: Negative for rash.   Neurological: Positive for weakness (slow improvement). Negative for headaches.   Endo/Heme/Allergies: Negative.    Psychiatric/Behavioral: Negative.    All other systems reviewed and are negative.     Physical Exam  Laboratory/Imaging   Hemodynamics  Temp (24hrs), Av.3 °C (97.3 °F), Min:36 °C (96.8 °F), Max:36.8 °C (98.3 °F)   Temperature: 36 °C (96.8 °F)  Pulse  Av.7  Min: 49  Max: 98   Blood Pressure : (!) 114/21 mmHg (Different BP machine 3rd time taken)     Respiratory      Respiration: 16, Pulse Oximetry: 92 %     Work Of Breathing / Effort: Mild  RUL Breath Sounds: Expiratory Wheezes, RML Breath Sounds: Diminished, RLL Breath Sounds: Diminished, BIRD Breath Sounds: Expiratory Wheezes, LLL Breath Sounds:  Diminished    Fluids    Intake/Output Summary (Last 24 hours) at 06/07/17 1417  Last data filed at 06/07/17 0800   Gross per 24 hour   Intake    265 ml   Output   4825 ml   Net  -4560 ml       Nutrition  Orders Placed This Encounter   Procedures   • DIET ORDER     Standing Status: Standing      Number of Occurrences: 1      Standing Expiration Date:      Order Specific Question:  Diet:     Answer:  Diabetic [3]     Order Specific Question:  Texture/Fiber modifications:     Answer:  Dysphagia 3(Mechanical Soft)specify fluid consistency(question 6) [3]      Comments:  no rice     Order Specific Question:  Consistency/Fluid modifications:     Answer:  Thin Liquids [3]     Physical Exam   Constitutional: She is oriented to person, place, and time. She appears well-developed and well-nourished.   HENT:   Head: Normocephalic and atraumatic.   Nose: Nose normal.   Mouth/Throat: Oropharynx is clear and moist.   RIJ CVC, insertion site C/D/I   Eyes: Conjunctivae and EOM are normal. Pupils are equal, round, and reactive to light. No scleral icterus.   Neck: Normal range of motion. Neck supple.   Cardiovascular: Normal rate, regular rhythm and normal heart sounds.  Exam reveals no gallop and no friction rub.    Pulmonary/Chest: Effort normal. No stridor. She has decreased breath sounds. She has no wheezes. She has rhonchi. She has rales (improved aeration today).   Abdominal: Soft. Bowel sounds are normal. She exhibits no distension.   Genitourinary:   Mckeon in place, clear urine in the collecting bag   Musculoskeletal: Normal range of motion. She exhibits edema. She exhibits no tenderness.   Neurological: She is alert and oriented to person, place, and time. Coordination normal.   Skin: Skin is warm and dry. She is not diaphoretic.   echymosis  LE ankle incision healing  Sutures removed  No changes appreciated today again   Psychiatric: She has a normal mood and affect. Her behavior is normal.   Nursing note and vitals  reviewed.      Recent Labs      06/05/17   0320  06/06/17 0615  06/07/17   0630   WBC  9.2  7.8  7.7   RBC  2.48*  2.67*  2.62*   HEMOGLOBIN  7.0*  7.7*  7.5*   HEMATOCRIT  23.2*  24.7*  24.3*   MCV  93.5  92.5  92.7   MCH  28.2  28.8  28.6   MCHC  30.2*  31.2*  30.9*   RDW  58.4*  56.0*  55.5*   PLATELETCT  179  204  190   MPV  9.3  9.2  9.3     Recent Labs      06/05/17   0320  06/06/17   0615  06/07/17   0630   SODIUM  138  140  139   POTASSIUM  3.3*  3.8  4.0   CHLORIDE  104  102  102   CO2  26  32  30   GLUCOSE  66  64*  99   BUN  27*  26*  22   CREATININE  1.10  1.09  1.25   CALCIUM  8.7  9.0  9.0         Recent Labs      06/05/17   0320   BNPBTYPENAT  552*              Assessment/Plan     COPD (chronic obstructive pulmonary disease) (CMS-HCC) (present on admission)  Assessment & Plan  Currently no wheezing, continues to improve yet again today  On RT , off steroids now  -PULM is following    Hypertensive heart disease with heart failure (CMS-HCC) (present on admission)  Assessment & Plan  Follow, avoid fluid OL  -continue PO lasix  -remove sykes catheter today  -mobilize!  -try to wean O2    Acute on chronic respiratory failure with hypoxia and hypercapnia (CMS-HCC) (present on admission)  Assessment & Plan  VDRF, pt extubated, slowly doing better with forced diuresis and IV abx's  -needs to be compliant with her outpatient CPAP    HCAP (healthcare-associated pneumonia) (present on admission)  Assessment & Plan  -continue IV unasyn, tolerating well, all cx's NGTD, likely will do an empiric 7 day course    [place] [place]  Assessment & Plan  resolved    DM type 2, uncontrolled, with renal complications (CMS-HCC) (present on admission)  Assessment & Plan  Tight control remains, continue ISS, adjust PRN, well controlled currently    CKD (chronic kidney disease), stage II (present on admission)  Assessment & Plan  -resolved, follow Cr level    Chronic pain syndrome (present on  admission)  Assessment & Plan  Judicious Rx    Tobacco abuse disorder (present on admission)  Assessment & Plan  [facility]. Quit in 2/17    Anemia of chronic disease (present on admission)  Assessment & Plan  -unclear cause, stable for now, likely needs outpatient EGD/COLO  Negative recent w/u, f/u am labs    Morbid obesity (CMS-HCC) (present on admission)  Assessment & Plan  Chronic, complicating     Hypothyroidism (present on admission)  Assessment & Plan  On rx    EDITH (obstructive sleep apnea) (present on admission)  Assessment & Plan  H/o, needs to be compliant with her CPAP      Labs reviewed and Medications reviewed  Mckeon catheter: Critically Ill - Requiring Accurate Measurement of Urinary Output      DVT Prophylaxis: Heparin (watch closely)        Assessed for rehab: Patient was assess for and/or received rehabilitation services during this hospitalization     Remove CVC in the RIJ once PIV has been obtained.

## 2017-06-07 NOTE — THERAPY
"Occupational Therapy Treatment completed with focus on ADLs, ADL transfers and patient education.  Functional Status:  Pt seen for OT tx. Education provided on CAM fitting. Min A LB dressing. Assistance primarily due to pain in urethra with trunk flexion. RN aware. Pt continues to be limited by fatigue. Will benefit from further OT services prior to d/c home in order to increase strength/functional mobility required for independence in ADLs.   Plan of Care: Will benefit from Occupational Therapy 3 times per week  Discharge Recommendations:  Equipment Will Continue to Assess for Equipment Needs. Post-acute therapy Discharge to a transitional care facility for continued skilled therapy services.    See \"Rehab Therapy-Acute\" Patient Summary Report for complete documentation.   "

## 2017-06-08 ENCOUNTER — APPOINTMENT (OUTPATIENT)
Dept: RADIOLOGY | Facility: MEDICAL CENTER | Age: 67
DRG: 871 | End: 2017-06-08
Attending: INTERNAL MEDICINE
Payer: MEDICARE

## 2017-06-08 LAB
ABO GROUP BLD: NORMAL
ANION GAP SERPL CALC-SCNC: 7 MMOL/L (ref 0–11.9)
ANISOCYTOSIS BLD QL SMEAR: ABNORMAL
BACTERIA BLD CULT: NORMAL
BARCODED ABORH UBTYP: 5100
BARCODED PRD CODE UBPRD: NORMAL
BARCODED UNIT NUM UBUNT: NORMAL
BASOPHILS # BLD AUTO: 0.9 % (ref 0–1.8)
BASOPHILS # BLD: 0.08 K/UL (ref 0–0.12)
BLD GP AB SCN SERPL QL: NORMAL
BUN SERPL-MCNC: 18 MG/DL (ref 8–22)
CALCIUM SERPL-MCNC: 8.4 MG/DL (ref 8.5–10.5)
CHLORIDE SERPL-SCNC: 102 MMOL/L (ref 96–112)
CO2 SERPL-SCNC: 32 MMOL/L (ref 20–33)
COMPONENT R 8504R: NORMAL
CREAT SERPL-MCNC: 1.16 MG/DL (ref 0.5–1.4)
EOSINOPHIL # BLD AUTO: 0.22 K/UL (ref 0–0.51)
EOSINOPHIL NFR BLD: 2.6 % (ref 0–6.9)
ERYTHROCYTE [DISTWIDTH] IN BLOOD BY AUTOMATED COUNT: 59.1 FL (ref 35.9–50)
GFR SERPL CREATININE-BSD FRML MDRD: 46 ML/MIN/1.73 M 2
GIANT PLATELETS BLD QL SMEAR: NORMAL
GLUCOSE BLD-MCNC: 121 MG/DL (ref 65–99)
GLUCOSE BLD-MCNC: 140 MG/DL (ref 65–99)
GLUCOSE BLD-MCNC: 162 MG/DL (ref 65–99)
GLUCOSE BLD-MCNC: 249 MG/DL (ref 65–99)
GLUCOSE SERPL-MCNC: 119 MG/DL (ref 65–99)
HCT VFR BLD AUTO: 23.6 % (ref 37–47)
HGB BLD-MCNC: 7 G/DL (ref 12–16)
LYMPHOCYTES # BLD AUTO: 0.98 K/UL (ref 1–4.8)
LYMPHOCYTES NFR BLD: 11.5 % (ref 22–41)
MANUAL DIFF BLD: NORMAL
MCH RBC QN AUTO: 28.1 PG (ref 27–33)
MCHC RBC AUTO-ENTMCNC: 29.7 G/DL (ref 33.6–35)
MCV RBC AUTO: 94.8 FL (ref 81.4–97.8)
METAMYELOCYTES NFR BLD MANUAL: 0.9 %
MICROCYTES BLD QL SMEAR: ABNORMAL
MONOCYTES # BLD AUTO: 0.6 K/UL (ref 0–0.85)
MONOCYTES NFR BLD AUTO: 7.1 % (ref 0–13.4)
MORPHOLOGY BLD-IMP: NORMAL
NEUTROPHILS # BLD AUTO: 6.55 K/UL (ref 2–7.15)
NEUTROPHILS NFR BLD: 77 % (ref 44–72)
NRBC # BLD AUTO: 0 K/UL
NRBC BLD AUTO-RTO: 0 /100 WBC
OVALOCYTES BLD QL SMEAR: NORMAL
PLATELET # BLD AUTO: 204 K/UL (ref 164–446)
PLATELET BLD QL SMEAR: NORMAL
PMV BLD AUTO: 9.6 FL (ref 9–12.9)
POIKILOCYTOSIS BLD QL SMEAR: NORMAL
POLYCHROMASIA BLD QL SMEAR: NORMAL
POTASSIUM SERPL-SCNC: 4 MMOL/L (ref 3.6–5.5)
PRODUCT TYPE UPROD: NORMAL
RBC # BLD AUTO: 2.49 M/UL (ref 4.2–5.4)
RBC BLD AUTO: PRESENT
RH BLD: NORMAL
SIGNIFICANT IND 70042: NORMAL
SITE SITE: NORMAL
SODIUM SERPL-SCNC: 141 MMOL/L (ref 135–145)
SOURCE SOURCE: NORMAL
UNIT STATUS USTAT: NORMAL
WBC # BLD AUTO: 8.5 K/UL (ref 4.8–10.8)

## 2017-06-08 PROCEDURE — 86900 BLOOD TYPING SEROLOGIC ABO: CPT

## 2017-06-08 PROCEDURE — 30253N1 TRANSFUSE NONAUT RED BLOOD CELLS IN PERIPH ART, PERC: ICD-10-PCS | Performed by: INTERNAL MEDICINE

## 2017-06-08 PROCEDURE — 99232 SBSQ HOSP IP/OBS MODERATE 35: CPT | Performed by: INTERNAL MEDICINE

## 2017-06-08 PROCEDURE — 700111 HCHG RX REV CODE 636 W/ 250 OVERRIDE (IP): Performed by: INTERNAL MEDICINE

## 2017-06-08 PROCEDURE — 36415 COLL VENOUS BLD VENIPUNCTURE: CPT

## 2017-06-08 PROCEDURE — 86901 BLOOD TYPING SEROLOGIC RH(D): CPT

## 2017-06-08 PROCEDURE — 82962 GLUCOSE BLOOD TEST: CPT

## 2017-06-08 PROCEDURE — A9270 NON-COVERED ITEM OR SERVICE: HCPCS | Performed by: HOSPITALIST

## 2017-06-08 PROCEDURE — 770006 HCHG ROOM/CARE - MED/SURG/GYN SEMI*

## 2017-06-08 PROCEDURE — A9270 NON-COVERED ITEM OR SERVICE: HCPCS

## 2017-06-08 PROCEDURE — 700102 HCHG RX REV CODE 250 W/ 637 OVERRIDE(OP)

## 2017-06-08 PROCEDURE — P9016 RBC LEUKOCYTES REDUCED: HCPCS

## 2017-06-08 PROCEDURE — 86850 RBC ANTIBODY SCREEN: CPT

## 2017-06-08 PROCEDURE — 80048 BASIC METABOLIC PNL TOTAL CA: CPT

## 2017-06-08 PROCEDURE — 700102 HCHG RX REV CODE 250 W/ 637 OVERRIDE(OP): Performed by: INTERNAL MEDICINE

## 2017-06-08 PROCEDURE — A9270 NON-COVERED ITEM OR SERVICE: HCPCS | Performed by: INTERNAL MEDICINE

## 2017-06-08 PROCEDURE — 85007 BL SMEAR W/DIFF WBC COUNT: CPT

## 2017-06-08 PROCEDURE — 85027 COMPLETE CBC AUTOMATED: CPT

## 2017-06-08 PROCEDURE — 36430 TRANSFUSION BLD/BLD COMPNT: CPT

## 2017-06-08 PROCEDURE — 700105 HCHG RX REV CODE 258: Performed by: INTERNAL MEDICINE

## 2017-06-08 PROCEDURE — 86923 COMPATIBILITY TEST ELECTRIC: CPT

## 2017-06-08 PROCEDURE — 700102 HCHG RX REV CODE 250 W/ 637 OVERRIDE(OP): Performed by: HOSPITALIST

## 2017-06-08 RX ORDER — CYCLOBENZAPRINE HCL 10 MG
10 TABLET ORAL 3 TIMES DAILY PRN
Status: DISCONTINUED | OUTPATIENT
Start: 2017-06-08 | End: 2017-06-09 | Stop reason: HOSPADM

## 2017-06-08 RX ORDER — LEVOFLOXACIN 750 MG/1
750 TABLET, FILM COATED ORAL DAILY
Status: COMPLETED | OUTPATIENT
Start: 2017-06-08 | End: 2017-06-09

## 2017-06-08 RX ADMIN — POTASSIUM CHLORIDE 20 MEQ: 1500 TABLET, EXTENDED RELEASE ORAL at 09:02

## 2017-06-08 RX ADMIN — OXYCODONE HYDROCHLORIDE 10 MG: 10 TABLET ORAL at 19:31

## 2017-06-08 RX ADMIN — INSULIN LISPRO 3 UNITS: 100 INJECTION, SOLUTION INTRAVENOUS; SUBCUTANEOUS at 20:51

## 2017-06-08 RX ADMIN — INSULIN GLARGINE 20 UNITS: 100 INJECTION, SOLUTION SUBCUTANEOUS at 20:51

## 2017-06-08 RX ADMIN — DULOXETINE HYDROCHLORIDE 60 MG: 60 CAPSULE, DELAYED RELEASE ORAL at 09:03

## 2017-06-08 RX ADMIN — BUDESONIDE AND FORMOTEROL FUMARATE DIHYDRATE 2 PUFF: 160; 4.5 AEROSOL RESPIRATORY (INHALATION) at 20:49

## 2017-06-08 RX ADMIN — AMLODIPINE BESYLATE 10 MG: 10 TABLET ORAL at 09:02

## 2017-06-08 RX ADMIN — LEVOFLOXACIN 750 MG: 750 TABLET, FILM COATED ORAL at 16:30

## 2017-06-08 RX ADMIN — TRAZODONE HYDROCHLORIDE 300 MG: 50 TABLET ORAL at 20:45

## 2017-06-08 RX ADMIN — GABAPENTIN 300 MG: 300 CAPSULE ORAL at 09:03

## 2017-06-08 RX ADMIN — OXYCODONE HYDROCHLORIDE 10 MG: 10 TABLET ORAL at 16:30

## 2017-06-08 RX ADMIN — OXYCODONE HYDROCHLORIDE 10 MG: 10 TABLET ORAL at 09:02

## 2017-06-08 RX ADMIN — LEVOTHYROXINE SODIUM 75 MCG: 75 TABLET ORAL at 05:26

## 2017-06-08 RX ADMIN — OMEPRAZOLE 40 MG: 20 CAPSULE, DELAYED RELEASE ORAL at 09:03

## 2017-06-08 RX ADMIN — INSULIN LISPRO 4 UNITS: 100 INJECTION, SOLUTION INTRAVENOUS; SUBCUTANEOUS at 11:00

## 2017-06-08 RX ADMIN — POTASSIUM CHLORIDE 20 MEQ: 1500 TABLET, EXTENDED RELEASE ORAL at 20:45

## 2017-06-08 RX ADMIN — FUROSEMIDE 20 MG: 20 TABLET ORAL at 16:30

## 2017-06-08 RX ADMIN — CYCLOBENZAPRINE 10 MG: 10 TABLET, FILM COATED ORAL at 20:45

## 2017-06-08 RX ADMIN — GABAPENTIN 300 MG: 300 CAPSULE ORAL at 20:45

## 2017-06-08 RX ADMIN — OXYCODONE HYDROCHLORIDE 10 MG: 10 TABLET ORAL at 23:57

## 2017-06-08 RX ADMIN — CARVEDILOL 3.12 MG: 3.12 TABLET, FILM COATED ORAL at 17:08

## 2017-06-08 RX ADMIN — MONTELUKAST SODIUM 10 MG: 10 TABLET, FILM COATED ORAL at 20:45

## 2017-06-08 RX ADMIN — TIOTROPIUM BROMIDE 1 CAPSULE: 18 CAPSULE ORAL; RESPIRATORY (INHALATION) at 09:06

## 2017-06-08 RX ADMIN — HEPARIN SODIUM 5000 UNITS: 5000 INJECTION, SOLUTION INTRAVENOUS; SUBCUTANEOUS at 05:28

## 2017-06-08 RX ADMIN — OXYCODONE HYDROCHLORIDE 10 MG: 10 TABLET ORAL at 13:30

## 2017-06-08 RX ADMIN — BUDESONIDE AND FORMOTEROL FUMARATE DIHYDRATE 2 PUFF: 160; 4.5 AEROSOL RESPIRATORY (INHALATION) at 09:06

## 2017-06-08 RX ADMIN — CARVEDILOL 3.12 MG: 3.12 TABLET, FILM COATED ORAL at 09:02

## 2017-06-08 ASSESSMENT — ENCOUNTER SYMPTOMS
SPUTUM PRODUCTION: 0
SHORTNESS OF BREATH: 1
FEVER: 0
COUGH: 0
EYES NEGATIVE: 1
HEADACHES: 0
BACK PAIN: 1
HEMOPTYSIS: 0
PSYCHIATRIC NEGATIVE: 1
WEAKNESS: 1
ABDOMINAL PAIN: 0

## 2017-06-08 ASSESSMENT — PAIN SCALES - GENERAL
PAINLEVEL_OUTOF10: 7
PAINLEVEL_OUTOF10: 6
PAINLEVEL_OUTOF10: 8
PAINLEVEL_OUTOF10: 6
PAINLEVEL_OUTOF10: 7
PAINLEVEL_OUTOF10: 6
PAINLEVEL_OUTOF10: 7
PAINLEVEL_OUTOF10: 6

## 2017-06-08 ASSESSMENT — LIFESTYLE VARIABLES: DO YOU DRINK ALCOHOL: NO

## 2017-06-08 NOTE — PROGRESS NOTES
[facility] Hospitalist Progress Note    Date of Service: 2017    Chief Complaint  66 y.o. female admitted 2017 with resp. Failure, admitted from SNF, Pt with recent hospitalization, ankle Fx, UTI, chr. Obesity, COPD and h/o tobacco abuse    Interval Problem Update    Pulmonary edema-breathing is a little better today. Still requiring 5 L NC. Mckeon is out, urinating well on her own.     L ankle fracture-healing well. Needs to go back to SNF    Anemia-Hb dropped down to 7. No e/o overt bleeding. Patient states she cannot afford co-pay for her outpatient EGD/COLO and that is why she hasnt gotten it done. Last one was 6 years ago.    Consultants/Specialty  Pulm,CC    Disposition  Back to short-term SNF, maybe tomorrow?        Review of Systems   Constitutional: Negative for fever and malaise/fatigue.        All slowly improving     Eyes: Negative.    Respiratory: Positive for shortness of breath. Negative for cough, hemoptysis and sputum production.         Nearly back to her baseline   Cardiovascular: Positive for leg swelling (about the same today). Negative for chest pain.   Gastrointestinal: Negative for abdominal pain.   Genitourinary: Negative.    Musculoskeletal: Positive for back pain and joint pain.        At her baseline, chronic   Skin: Negative for rash.   Neurological: Positive for weakness (more improvement today). Negative for headaches.   Endo/Heme/Allergies: Negative.    Psychiatric/Behavioral: Negative.    All other systems reviewed and are negative.     Physical Exam  Laboratory/Imaging   Hemodynamics  Temp (24hrs), Av.7 °C (98.1 °F), Min:36.6 °C (97.9 °F), Max:36.9 °C (98.4 °F)   Temperature: 36.6 °C (97.9 °F)  Pulse  Av.6  Min: 49  Max: 98   Blood Pressure : 122/44 mmHg     Respiratory      Respiration: 18, Pulse Oximetry: 96 %     Work Of Breathing / Effort: Mild  RUL Breath Sounds: Expiratory Wheezes, RML Breath Sounds: Diminished, RLL Breath Sounds: Diminished, BIRD Breath Sounds:  Expiratory Wheezes, LLL Breath Sounds: Diminished    Fluids    Intake/Output Summary (Last 24 hours) at 06/08/17 1330  Last data filed at 06/08/17 0900   Gross per 24 hour   Intake    520 ml   Output   1275 ml   Net   -755 ml       Nutrition  Orders Placed This Encounter   Procedures   • DIET ORDER     Standing Status: Standing      Number of Occurrences: 1      Standing Expiration Date:      Order Specific Question:  Diet:     Answer:  Diabetic [3]     Order Specific Question:  Texture/Fiber modifications:     Answer:  Dysphagia 3(Mechanical Soft)specify fluid consistency(question 6) [3]      Comments:  no rice     Order Specific Question:  Consistency/Fluid modifications:     Answer:  Thin Liquids [3]     Physical Exam   Constitutional: She is oriented to person, place, and time. She appears well-developed and well-nourished.   HENT:   Head: Normocephalic and atraumatic.   Nose: Nose normal.   Mouth/Throat: Oropharynx is clear and moist.   Eyes: Conjunctivae and EOM are normal. Pupils are equal, round, and reactive to light. Right eye exhibits no discharge. Left eye exhibits no discharge.   Neck: Normal range of motion. Neck supple.   Cardiovascular: Normal rate, regular rhythm and normal heart sounds.  Exam reveals no gallop and no friction rub.    Pulmonary/Chest: Effort normal. No stridor. She has decreased breath sounds. She has no wheezes. She has rhonchi. She has rales (better aeration today).   Abdominal: Soft. Bowel sounds are normal. There is no tenderness.   Musculoskeletal: Normal range of motion. She exhibits edema. She exhibits no tenderness.   Neurological: She is alert and oriented to person, place, and time. Coordination normal.   Skin: Skin is warm and dry. She is not diaphoretic. No pallor.   echymosis  LE ankle incision healing  Sutures removed  No changes appreciated today again   Psychiatric: She has a normal mood and affect. Her behavior is normal.   Nursing note and vitals reviewed.       Recent Labs      06/06/17   0615  06/07/17   0630  06/08/17   0440   WBC  7.8  7.7  8.5   RBC  2.67*  2.62*  2.49*   HEMOGLOBIN  7.7*  7.5*  7.0*   HEMATOCRIT  24.7*  24.3*  23.6*   MCV  92.5  92.7  94.8   MCH  28.8  28.6  28.1   MCHC  31.2*  30.9*  29.7*   RDW  56.0*  55.5*  59.1*   PLATELETCT  204  190  204   MPV  9.2  9.3  9.6     Recent Labs      06/06/17   0615  06/07/17   0630  06/08/17   0440   SODIUM  140  139  141   POTASSIUM  3.8  4.0  4.0   CHLORIDE  102  102  102   CO2  32  30  32   GLUCOSE  64*  99  119*   BUN  26*  22  18   CREATININE  1.09  1.25  1.16   CALCIUM  9.0  9.0  8.4*                      Assessment/Plan     COPD (chronic obstructive pulmonary disease) (CMS-HCC) (present on admission)  Assessment & Plan  Currently no wheezing, daily improvement noted  On RT , off steroids now  -PULM is following    Hypertensive heart disease with heart failure (CMS-HCC) (present on admission)  Assessment & Plan  Follow, avoid fluid OL  -continue PO lasix  -remove sykes catheter today  -mobilize!  -try to wean O2    Acute on chronic respiratory failure with hypoxia and hypercapnia (CMS-HCC) (present on admission)  Assessment & Plan  VDRF, pt extubated, slowly doing better with forced diuresis and switch to PO levaquin for 2 more days  -needs to be compliant with her outpatient CPAP    HCAP (healthcare-associated pneumonia) (present on admission)  Assessment & Plan  -stop abx's and start PO as outlined above, do empiric 7 day course    [place] [place] (present on admission)  Assessment & Plan  resolved    DM type 2, uncontrolled, with renal complications (CMS-HCC) (present on admission)  Assessment & Plan  -remains well controlled, continue lantus, ISS, adjust PRN    CKD (chronic kidney disease), stage II (present on admission)  Assessment & Plan  -resolved, follow Cr level    Chronic pain syndrome (present on admission)  Assessment & Plan  Judicious Rx    Tobacco abuse disorder (present on  admission)  Assessment & Plan  [facility]. Quit in 2/17    Anemia of chronic disease (present on admission)  Assessment & Plan  -unclear cause, dropped again, ikely needs outpatient EGD/COLO, but cannot afford COPAY  -unlikely to do inpatient given high o2 requirements at this time.   -transfuse 1 U PRBC's  -no e/o hemolysis at this time, consider BM issue?  Negative recent w/u, f/u am labs  -stop DVT px    Morbid obesity (CMS-HCC) (present on admission)  Assessment & Plan  Chronic, complicating     Hypothyroidism (present on admission)  Assessment & Plan  On rx    EDITH (obstructive sleep apnea) (present on admission)  Assessment & Plan  H/o, needs to be compliant with her CPAP      Labs reviewed and Medications reviewed  Mckeon catheter: Critically Ill - Requiring Accurate Measurement of Urinary Output      DVT Prophylaxis: Contraindicated - Anemia requiring blood transfusion (watch closely)  DVT prophylaxis - mechanical: SCDs      Assessed for rehab: Patient was assess for and/or received rehabilitation services during this hospitalization     Remove CVC in the RIJ once PIV has been obtained.

## 2017-06-08 NOTE — DISCHARGE PLANNING
Transitional Care Navigator:    TCN met with patient discuss returning to [facility] SNF once medically cleared.  Pt in agreement.  Choice form faxed to [facility]. Sw aware. TCN will follow-up as needed.

## 2017-06-08 NOTE — PROGRESS NOTES
Assumed care of patient @ 0700, report received at bedside,  assessment done, labs and orders noted.  Pt A & Ox4, PIV infiltrated, US IV attempted.  Will call ICU and/or ER to get assistance with PIV placement.  Pt is resting comfortably in bed, no signs or symptoms of distress.  Pt reports pain is a 6/10, administered oxy 10 as per mar.  Pt has been updated on the plan of care.    Bed alarm is off, bed is in lowest position, fall risk socks in place, call light within reach. Pt verbalized all needs are met at this time.

## 2017-06-08 NOTE — DISCHARGE PLANNING
SNF Referral sent to #1 [facility]. [facility]. [facility] per choice form. Disregard notice sent to [facility] and [facility]. Pt only wanted to go to [facility].

## 2017-06-08 NOTE — PROGRESS NOTES
Pulmonary Critical Care Progress Note      Date of Service: 6/8/2017    Chief Complaint: Worsening shortness of breath and altered mental status    History of Present Illness:  Please notes that the patient was sedated and intubated prior to obtaining history and all history was obtained from old chart records as well as the ER history physician, Dr. Bae.  The patient is a 66-year-old female who was receiving rehabilitation at the [facility] after undergoing an ankle fracture several weeks ago, who developed urinary tract infection symptoms several days ago and was being treated for a UTI.  Apparently, she had fevers up to 102.  However, over the course of the evening, the patient started having worsening shortness of breath and altered mental status.  Due to worsening symptoms of shortness of breath as well as continued altered mental status, it was decided to send the patient to the emergency department.  Upon arrival to the emergency department, the patient continued to be unresponsive and the decision to intubate was made.  Once the patient was intubated and a central line was    placed.  The patient immediately had drops in her blood pressure into the 60s and 70s and based on her chest x-ray, they thought that perhaps she had healthcare-acquired pneumonia and was started on the sepsis protocol with 4 liters of normal saline boluses as well as vasopressor therapy.    ROS:  Unable to obtain as the patient is intubated and sedated    Interval Events:  24 hour interval history reviewed   Doing well. Almost at baseline in term of breathing     PFSH:  No change.    Physical Exam:  General:  Alert, oriented.   HEENT: Normocephalic, atraumatic, PERRL.   CVS:  Bradycardic, regular rhythm.  Respiratory:  Diminished breath sounds.   Abdomen:  Soft.  Extremities:  Without edema. Left leg with walking boot.  Neuro/Psych:  Normal affect and behavior.     Respiratory:     Pulse Oximetry: 96  %  ImagingAvailable data reviewed   CXR reviewed with team        Invalid input(s): [id number]  HemoDynamics:  Pulse: 61  Blood Pressure : 122/44 mmHg    Imaging: Available data reviewed         Neuro:  [facility]    Imaging: Available data reviewed    Fluids:  Intake/Output       17 - 1759 17 - 17 0659 17 - 17 0659      [id number] 4673-2822 Total 6870-8468 6748-8824 Total 5940-1171 1663-7298 Total       Intake    P.O.  480  -- 480  545  -- 545  240  -- 240    P.O. 480 -- 480 545 -- 545 240 -- 240    Total Intake 480 -- 480 545 -- 545 240 -- 240       Output    Urine  2700  1200 3900  3600  -- 3600  --  -- --    Number of Times Voided -- -- -- -- -- -- 2 x -- 2 x    Indwelling Cathether 2700 1200 3900 3600 -- 3600 -- -- --    Stool  --  -- --  --  -- --  --  -- --    Number of Times Stooled 3 x -- 3 x -- 0 x 0 x 1 x -- 1 x    Total Output 2700 1200 3900 3600 -- 3600 -- -- --       Net I/O     -2220 -1200 -3420 -3055 -- -3055 240 -- 240           Recent Labs      1730  17   0440   SODIUM  140  139  141   POTASSIUM  3.8  4.0  4.0   CHLORIDE  102  102  102   CO2  32  30  32   BUN  26*  22  18   CREATININE  1.09  1.25  1.16   CALCIUM  9.0  9.0  8.4*     GI/Nutrition:  Imaging: Available data reviewed  NPO     Liver Function:  Recent Labs      1730  17   0440   GLUCOSE  64*  99  119*     Heme:  Recent Labs      1730  17   0440   RBC  2.67*  2.62*  2.49*   HEMOGLOBIN  7.7*  7.5*  7.0*   HEMATOCRIT  24.7*  24.3*  23.6*   PLATELETCT  204  190  204     Infectious Disease:  Temp  Av.7 °C (98.1 °F)  Min: 36.6 °C (97.9 °F)  Max: 36.9 °C (98.4 °F)  Micro: reviewed   Recent Labs      17   0615  17   0630  17   0440   WBC  7.8  7.7  8.5   NEUTSPOLYS  67.30  69.30  77.00*   LYMPHOCYTES  16.80*  15.30*  11.50*   MONOCYTES  9.60  8.90  7.10   EOSINOPHILS  4.60   3.80  2.60   BASOPHILS  0.50  0.70  0.90     Current Facility-Administered Medications   Medication Dose Frequency Provider Last Rate Last Dose   • loperamide (IMODIUM) capsule 2 mg  2 mg 4X/DAY PRN Lucian Fung M.D.   2 mg at 06/07/17 1354   • omeprazole (PRILOSEC) capsule 40 mg  40 mg DAILY Lucian Fung M.D.   40 mg at 06/08/17 0903   • potassium chloride SA (Kdur) tablet 20 mEq  20 mEq BID Lucian Fung M.D.   20 mEq at 06/08/17 0902   • trazodone (DESYREL) tablet 300 mg  300 mg QHS Roger Ahmadi M.D.   300 mg at 06/07/17 2336   • ampicillin/sulbactam (UNASYN) 3 g in  mL IVPB  3 g Q6HRS William Hope M.D.   Stopped at 06/08/17 0007   • amlodipine (NORVASC) tablet 10 mg  10 mg Q DAY William Hope M.D.   10 mg at 06/08/17 0902   • carvedilol (COREG) tablet 3.125 mg  3.125 mg BID WITH MEALS William Hope M.D.   3.125 mg at 06/08/17 0902   • furosemide (LASIX) tablet 20 mg  20 mg BID DIURETIC Larry Cordero M.D.   Stopped at 06/08/17 0600   • insulin lispro (HUMALOG) injection 3-14 Units  3-14 Units 4X/DAY ACHS Larry Cordero M.D.   Stopped at 06/08/17 0830   • levothyroxine (SYNTHROID) tablet 75 mcg  75 mcg AM ES Juliocesar Ramon, PHARMD   75 mcg at 06/08/17 0526   • albuterol inhaler 2 Puff  2 Puff Q4HRS PRN Sierra Vela M.D.   2 Puff at 06/04/17 1234   • budesonide-formoterol (SYMBICORT) 160-4.5 MCG/ACT inhaler 2 Puff  2 Puff BID Sierra Vela M.D.   2 Puff at 06/08/17 0906   • tiotropium (SPIRIVA) 18 MCG inhalation capsule 1 Cap  1 Cap DAILY Sierra Vela M.D.   1 Cap at 06/08/17 0906   • Respiratory Care per Protocol   Continuous RT Sierra Vela M.D.       • duloxetine (CYMBALTA) capsule 60 mg  60 mg DAILY ROBY Loja.O.   60 mg at 06/08/17 0903   • gabapentin (NEURONTIN) capsule 300 mg  300 mg BID ROBY Loja.O.   300 mg at 06/08/17 0903   • insulin glargine (LANTUS) injection 20 Units  20 Units Nightly ROBY Loja.O.   20 Units at  06/07/17 2127   • montelukast (SINGULAIR) tablet 10 mg  10 mg Q EVENING ROBY Loja.O.   10 mg at 06/07/17 2115   • polyethylene glycol/lytes (MIRALAX) PACKET 1 Packet  1 Packet QDAY PRN ROBY Loja.O.        And   • magnesium hydroxide (MILK OF MAGNESIA) suspension 30 mL  30 mL QDAY PRN Angel Huynh D.O.        And   • bisacodyl (DULCOLAX) suppository 10 mg  10 mg QDAY PRN ROBY Loja.O.       • acetaminophen (TYLENOL) tablet 650 mg  650 mg Q6HRS PRN ROBY Loja.O.   650 mg at 06/06/17 1518   • glucose 4 g chewable tablet 16 g  16 g Q15 MIN PRN ROBY Loja.O.        And   • dextrose 50% (D50W) injection 25 mL  25 mL Q15 MIN PRN GHULAM LojaO.       • ondansetron (ZOFRAN) syringe/vial injection 4 mg  4 mg Q4HRS PRN ROBY Loja.O.       • ondansetron (ZOFRAN ODT) dispertab 4 mg  4 mg Q4HRS PRN ROBY Loja.O.       • oxycodone immediate release (ROXICODONE) tablet 10 mg  10 mg Q3HRS PRN ROBY Loja.O.   10 mg at 06/08/17 0902   • acetaminophen (TYLENOL) suppository 650 mg  650 mg Q6HRS PRN ROBY Loja.O.   650 mg at 06/02/17 1255     Last reviewed on 6/5/2017 11:55 AM by Fermin Michael Ass't    Quality  Measures:  Labs reviewed, Medications reviewed, Radiology images reviewed and EKG reviewed  Mckeon catheter: Critically Ill - Requiring Accurate Measurement of Urinary Output  Central line in place: Need for access, Vasopressors, Sepsis and Shock    DVT Prophylaxis: Heparin  DVT prophylaxis - mechanical: SCDs  Ulcer prophylaxis: Yes  Antibiotics: Treating active infection/contamination beyond 24 hours perioperative coverage  Assessed for rehab: Patient was assess for and/or received rehabilitation services during this hospitalization    Lines:  Right IJ 6/2    Gtts:  None    Abx:  Day 4 Vancomycin  Day 4 Zosyn     Ucx 6/2 negative  Bcx 6/2 negative  Scx 6/2 negative     Echo 3/16 EF 60%, RVSP 60.   LE Doppler 6/4 (-) for dvt    Problems/Plan:  Acute hypoxic  respiratory failure.   - extubated on 6/3   - cont RT/O2 protocols   - diuresing well   - encourage IS/PEP  Severe sepsis with septic shock, likely due to pulmonary source.   - s/p sepsis protocol   - lactates normalized after IVF   - de escalate abx   - follow cultures  Healthcare-acquired pneumonia.   - follow cultures   - change abx to unasyn  History of smoking in the past. Probably has component of COPD. Patient is scheduled to be seen by pulmonary as outpatient   Con O2, BDs. Steroids and abx

## 2017-06-08 NOTE — CARE PLAN
Problem: Safety  Goal: Will remain free from falls  Intervention: Assess risk factors for falls  Assisted using the bed side commode.      Problem: Discharge Barriers/Planning  Goal: Patient’s continuum of care needs will be met  Intervention: Collaborate with Transitional Care Team and Interdisciplinary Team to meet discharge needs  Possible discharge to [facility] skilled  home with home health.

## 2017-06-09 ENCOUNTER — APPOINTMENT (OUTPATIENT)
Dept: RADIOLOGY | Facility: MEDICAL CENTER | Age: 67
DRG: 871 | End: 2017-06-09
Attending: INTERNAL MEDICINE
Payer: MEDICARE

## 2017-06-09 VITALS
OXYGEN SATURATION: 91 % | DIASTOLIC BLOOD PRESSURE: 35 MMHG | WEIGHT: 229.28 LBS | HEART RATE: 60 BPM | TEMPERATURE: 97.3 F | BODY MASS INDEX: 39.14 KG/M2 | SYSTOLIC BLOOD PRESSURE: 117 MMHG | HEIGHT: 64 IN | RESPIRATION RATE: 16 BRPM

## 2017-06-09 PROBLEM — J96.22 ACUTE ON CHRONIC RESPIRATORY FAILURE WITH HYPOXIA AND HYPERCAPNIA (HCC): Status: RESOLVED | Noted: 2017-04-04 | Resolved: 2017-06-09

## 2017-06-09 PROBLEM — A41.9 SEPSIS, UNSPECIFIED: Status: RESOLVED | Noted: 2017-06-02 | Resolved: 2017-06-09

## 2017-06-09 PROBLEM — S82.899A ANKLE FRACTURE: Status: RESOLVED | Noted: 2017-06-05 | Resolved: 2017-06-09

## 2017-06-09 PROBLEM — J18.9 HCAP (HEALTHCARE-ASSOCIATED PNEUMONIA): Status: RESOLVED | Noted: 2017-04-04 | Resolved: 2017-06-09

## 2017-06-09 PROBLEM — J96.21 ACUTE ON CHRONIC RESPIRATORY FAILURE WITH HYPOXIA AND HYPERCAPNIA (HCC): Status: RESOLVED | Noted: 2017-04-04 | Resolved: 2017-06-09

## 2017-06-09 LAB
ANION GAP SERPL CALC-SCNC: 5 MMOL/L (ref 0–11.9)
BASOPHILS # BLD AUTO: 0.6 % (ref 0–1.8)
BASOPHILS # BLD: 0.06 K/UL (ref 0–0.12)
BUN SERPL-MCNC: 17 MG/DL (ref 8–22)
CALCIUM SERPL-MCNC: 8.7 MG/DL (ref 8.5–10.5)
CHLORIDE SERPL-SCNC: 103 MMOL/L (ref 96–112)
CO2 SERPL-SCNC: 31 MMOL/L (ref 20–33)
CREAT SERPL-MCNC: 1.21 MG/DL (ref 0.5–1.4)
EOSINOPHIL # BLD AUTO: 0.32 K/UL (ref 0–0.51)
EOSINOPHIL NFR BLD: 3.3 % (ref 0–6.9)
ERYTHROCYTE [DISTWIDTH] IN BLOOD BY AUTOMATED COUNT: 60.6 FL (ref 35.9–50)
GFR SERPL CREATININE-BSD FRML MDRD: 44 ML/MIN/1.73 M 2
GLUCOSE BLD-MCNC: 181 MG/DL (ref 65–99)
GLUCOSE BLD-MCNC: 96 MG/DL (ref 65–99)
GLUCOSE SERPL-MCNC: 111 MG/DL (ref 65–99)
HCT VFR BLD AUTO: 24.3 % (ref 37–47)
HGB BLD-MCNC: 7.3 G/DL (ref 12–16)
IMM GRANULOCYTES # BLD AUTO: 0.22 K/UL (ref 0–0.11)
IMM GRANULOCYTES NFR BLD AUTO: 2.3 % (ref 0–0.9)
LYMPHOCYTES # BLD AUTO: 1.23 K/UL (ref 1–4.8)
LYMPHOCYTES NFR BLD: 12.8 % (ref 22–41)
MAGNESIUM SERPL-MCNC: 1.7 MG/DL (ref 1.5–2.5)
MCH RBC QN AUTO: 28.2 PG (ref 27–33)
MCHC RBC AUTO-ENTMCNC: 30 G/DL (ref 33.6–35)
MCV RBC AUTO: 93.8 FL (ref 81.4–97.8)
MONOCYTES # BLD AUTO: 0.83 K/UL (ref 0–0.85)
MONOCYTES NFR BLD AUTO: 8.6 % (ref 0–13.4)
NEUTROPHILS # BLD AUTO: 6.97 K/UL (ref 2–7.15)
NEUTROPHILS NFR BLD: 72.4 % (ref 44–72)
NRBC # BLD AUTO: 0 K/UL
NRBC BLD AUTO-RTO: 0 /100 WBC
PLATELET # BLD AUTO: 227 K/UL (ref 164–446)
PMV BLD AUTO: 9.5 FL (ref 9–12.9)
POTASSIUM SERPL-SCNC: 4.2 MMOL/L (ref 3.6–5.5)
RBC # BLD AUTO: 2.59 M/UL (ref 4.2–5.4)
SODIUM SERPL-SCNC: 139 MMOL/L (ref 135–145)
WBC # BLD AUTO: 9.6 K/UL (ref 4.8–10.8)

## 2017-06-09 PROCEDURE — A9270 NON-COVERED ITEM OR SERVICE: HCPCS

## 2017-06-09 PROCEDURE — 99239 HOSP IP/OBS DSCHRG MGMT >30: CPT | Performed by: INTERNAL MEDICINE

## 2017-06-09 PROCEDURE — A9270 NON-COVERED ITEM OR SERVICE: HCPCS | Performed by: INTERNAL MEDICINE

## 2017-06-09 PROCEDURE — 92526 ORAL FUNCTION THERAPY: CPT

## 2017-06-09 PROCEDURE — 85025 COMPLETE CBC W/AUTO DIFF WBC: CPT

## 2017-06-09 PROCEDURE — 700102 HCHG RX REV CODE 250 W/ 637 OVERRIDE(OP): Performed by: INTERNAL MEDICINE

## 2017-06-09 PROCEDURE — 83735 ASSAY OF MAGNESIUM: CPT

## 2017-06-09 PROCEDURE — 36415 COLL VENOUS BLD VENIPUNCTURE: CPT

## 2017-06-09 PROCEDURE — A9270 NON-COVERED ITEM OR SERVICE: HCPCS | Performed by: HOSPITALIST

## 2017-06-09 PROCEDURE — 82962 GLUCOSE BLOOD TEST: CPT

## 2017-06-09 PROCEDURE — 700102 HCHG RX REV CODE 250 W/ 637 OVERRIDE(OP): Performed by: HOSPITALIST

## 2017-06-09 PROCEDURE — 700102 HCHG RX REV CODE 250 W/ 637 OVERRIDE(OP)

## 2017-06-09 PROCEDURE — 80048 BASIC METABOLIC PNL TOTAL CA: CPT

## 2017-06-09 RX ORDER — OMEPRAZOLE 40 MG/1
40 CAPSULE, DELAYED RELEASE ORAL DAILY
Qty: 30 CAP | Status: ON HOLD
Start: 2017-06-09 | End: 2017-11-12

## 2017-06-09 RX ORDER — FUROSEMIDE 20 MG/1
20 TABLET ORAL
Status: DISCONTINUED | OUTPATIENT
Start: 2017-06-10 | End: 2017-06-09 | Stop reason: HOSPADM

## 2017-06-09 RX ORDER — POTASSIUM CHLORIDE 20 MEQ/1
20 TABLET, EXTENDED RELEASE ORAL 2 TIMES DAILY
Qty: 60 TAB | Refills: 11 | Status: ON HOLD
Start: 2017-06-09 | End: 2017-07-03

## 2017-06-09 RX ORDER — FUROSEMIDE 20 MG/1
20 TABLET ORAL DAILY
Qty: 60 TAB | Status: ON HOLD
Start: 2017-06-10 | End: 2017-06-22

## 2017-06-09 RX ORDER — LOPERAMIDE HYDROCHLORIDE 2 MG/1
2 CAPSULE ORAL 4 TIMES DAILY PRN
Qty: 30 CAP | Status: ON HOLD
Start: 2017-06-09 | End: 2017-06-22

## 2017-06-09 RX ADMIN — INSULIN LISPRO 3 UNITS: 100 INJECTION, SOLUTION INTRAVENOUS; SUBCUTANEOUS at 11:50

## 2017-06-09 RX ADMIN — AMLODIPINE BESYLATE 10 MG: 10 TABLET ORAL at 08:38

## 2017-06-09 RX ADMIN — OMEPRAZOLE 40 MG: 20 CAPSULE, DELAYED RELEASE ORAL at 08:38

## 2017-06-09 RX ADMIN — CARVEDILOL 3.12 MG: 3.12 TABLET, FILM COATED ORAL at 08:39

## 2017-06-09 RX ADMIN — POTASSIUM CHLORIDE 20 MEQ: 1500 TABLET, EXTENDED RELEASE ORAL at 08:39

## 2017-06-09 RX ADMIN — TIOTROPIUM BROMIDE 1 CAPSULE: 18 CAPSULE ORAL; RESPIRATORY (INHALATION) at 10:15

## 2017-06-09 RX ADMIN — OXYCODONE HYDROCHLORIDE 10 MG: 10 TABLET ORAL at 10:14

## 2017-06-09 RX ADMIN — LEVOTHYROXINE SODIUM 75 MCG: 75 TABLET ORAL at 06:38

## 2017-06-09 RX ADMIN — DULOXETINE HYDROCHLORIDE 60 MG: 60 CAPSULE, DELAYED RELEASE ORAL at 08:38

## 2017-06-09 RX ADMIN — OXYCODONE HYDROCHLORIDE 10 MG: 10 TABLET ORAL at 13:24

## 2017-06-09 RX ADMIN — BUDESONIDE AND FORMOTEROL FUMARATE DIHYDRATE 2 PUFF: 160; 4.5 AEROSOL RESPIRATORY (INHALATION) at 08:41

## 2017-06-09 RX ADMIN — LEVOFLOXACIN 750 MG: 750 TABLET, FILM COATED ORAL at 08:38

## 2017-06-09 RX ADMIN — GABAPENTIN 300 MG: 300 CAPSULE ORAL at 08:38

## 2017-06-09 ASSESSMENT — PAIN SCALES - GENERAL
PAINLEVEL_OUTOF10: 7
PAINLEVEL_OUTOF10: 5
PAINLEVEL_OUTOF10: 6

## 2017-06-09 ASSESSMENT — LIFESTYLE VARIABLES
EVER_SMOKED: NEVER
DO YOU DRINK ALCOHOL: NO

## 2017-06-09 NOTE — PROGRESS NOTES
Patient's PIV infiltrated during blood transfusion; had been placed via US-guided by ICU RN. MD on call paged for update. Order for EJ IV obtained. Primary RN aware.

## 2017-06-09 NOTE — THERAPY
"Speech Language Therapy dysphagia treatment completed.   Functional Status:  Dysphagia therapy completed this date. Patient alert and awake. She consume PO trials of regular solids with good oral function and no overt signs of aspiration. Patient able to feed herself and use safe swallow strategies independently.   Recommendations:Recommend diet upgrade to regular/thin liquid diet.   Plan of Care: Will benefit from Speech Therapy 2 times per week  Post-Acute Therapy: Currently anticipate no further skilled therapy needs once patient is discharged from the inpatient setting.    See \"Rehab Therapy-Acute\" Patient Summary Report for complete documentation.     "

## 2017-06-09 NOTE — DISCHARGE SUMMARY
CHIEF COMPLAINT ON ADMISSION  Chief Complaint   Patient presents with   • Shortness of Breath   • Fever       CODE STATUS  Full Code    HPI & HOSPITAL COURSE  This is a 66 y.o. year old female here with shortness of breath and fever. She was originally admitted by my colleague, Dr Angel Huynh, and please refer to this H&P for further details. Patient was intubated and sedated in the ER given respiratory failure and impending collapse. Sepsis protocol was initiated and patient was empirically treated for HCAP. She was placed on IV vancomycin and IV zosyn, which on hospital Day#3, was transitioned to IV unasyn for 5 more days and then 2 days of PO levaquin, for a total of 10 days. Her cultures have remain NGTD. Patient was successfully extubated. She was transitioned to the floor and was given forced diuresis with excellent results. She returned back to her baseline chronic oxygen requirements of 4 L NC. Additionally, her H/H did drop to 6.8 without any evidence of overt blood loss. Patient has had an extensive work up for this in the past with no clear etiology. Her iron stores are replete. She has not had  COLONOSCOPY in 6 years and it has been recommended to the patient that she eventually get an outpatient EGD/COLO for further evaluation, but patient claims she cannot afford the co-pay. Additionally, she is quite high risk for this procedure given her large oxygen requirements at this time. I informed the patient that she needs to follow up with her PCP and GI doctor for further evaluation. She was given 1 U PRBC's with good effect. Her left ankle fracture has been healing well and PT/OT evaluated the patient and recommended SNF. She is now stable for transfer to SNF.    Therefore, she is discharged in fair and stable condition for further post-acute management.     SPECIFIC OUTPATIENT FOLLOW-UP  -F/U with her orthopedic surgeon for her recent surgery on her L ankle  -F/U with her PCP and GI doctor outpatient  EGD/COLO for persistent anemia of unknown etiology.  -Needs a repeat sleep study and patient is encouraged to use her nocturnal CPAP at home    DISCHARGE PROBLEM LIST  Active Problems:    COPD (chronic obstructive pulmonary disease) (CMS-HCC) (Chronic) POA: Yes      Overview: On oxygen 3L nocturnal    Hypertensive heart disease with heart failure (CMS-HCC) (Chronic) POA: Yes      Overview:                               ICD-10 transition    DM type 2, uncontrolled, with renal complications (CMS-HCC) (Chronic) POA: Yes    CKD (chronic kidney disease), stage II (Chronic) POA: Yes    Morbid obesity (CMS-HCC) (Chronic) POA: Yes    Hypothyroidism (Chronic) POA: Yes    EDITH (obstructive sleep apnea) (Chronic) POA: Yes    Chronic pain syndrome (Chronic) POA: Yes    Tobacco abuse disorder POA: Yes    Anemia of chronic disease (Chronic) POA: Yes    Hypoxia POA: Yes  Resolved Problems:    Acute on chronic respiratory failure with hypoxia and hypercapnia (CMS-HCC) POA: Yes    HCAP (healthcare-associated pneumonia) POA: Yes    Sepsis (CMS-HCC) POA: Yes    Ankle fracture POA: Yes      FOLLOW UP  Future Appointments  Date Time Provider Department Center   6/15/2017 11:20 AM [facility]   6/26/2017 11:40 AM [facility] None   8/9/2017 8:30 AM [facility]RIS[facility]     Pcp Pt States None            MEDICATIONS ON DISCHARGE   Priya Callahan   Home Medication Instructions JAY:[id number]    Printed on:06/09/17 105   Medication Information                      amlodipine (NORVASC) 10 MG Tab  Take 1 Tab by mouth every day.             budesonide-formoterol (SYMBICORT) 160-4.5 MCG/ACT Aerosol  Inhale 2 Puffs by mouth 2 Times a Day.             carvedilol (COREG) 3.125 MG Tab  Take 3.125 mg by mouth 2 times a day, with meals.             Cholecalciferol (VITAMIN D3) 2000 UNITS Tab  Take 2,000 Units by mouth every day.             Cyanocobalamin (VITAMIN B-12) 1000 MCG  Tab  Take 1,000 mcg by mouth every evening.             cyanocobalamin (VITAMIN B-12) 1000 MCG/ML Solution  1,000 mcg by Intramuscular route every Saturday.             cyclobenzaprine (FLEXERIL) 10 MG Tab  Take 10 mg by mouth 3 times a day as needed.             docusate sodium (COLACE) 100 MG Cap  Take 100 mg by mouth 2 times a day.             duloxetine (CYMBALTA) 60 MG Cap DR Particles delayed-release capsule  Take 1 Cap by mouth every day.             ferrous gluconate (FERGON) 324 (38 FE) MG Tab  Take 1 Tab by mouth 2 times a day, with meals.             folic acid (FOLVITE) 800 MCG tablet  Take 800 mcg by mouth every day.             furosemide (LASIX) 20 MG Tab  Take 1 Tab by mouth every day.             gabapentin (NEURONTIN) 600 MG tablet  Take 1 Tab by mouth 3 times a day.             hydrocortisone (CORTEF) 5 MG Tab  Take 5 mg by mouth every day.             insulin glargine (LANTUS) 100 UNIT/ML Solution  Inject 22 Units as instructed every evening.             insulin lispro (HUMALOG) 100 UNIT/ML Solution  Inject 2-9 Units as instructed 4 Times a Day,Before Meals and at Bedtime.             ipratropium-albuterol (COMBIVENT RESPIMAT)  MCG/ACT Aero Soln  Inhale 1 Puff by mouth every 6 hours as needed.             levothyroxine (SYNTHROID) 75 MCG Tab  TAKE 1 TABLET BY MOUTH DAILY             loperamide (IMODIUM) 2 MG Cap  Take 1 Cap by mouth 4 times a day as needed for Diarrhea.             montelukast (SINGULAIR) 10 MG Tab  Take 1 Tab by mouth every day.             multivitamin (THERAGRAN) Tab  Take 1 Tab by mouth every day.             nicotine (NICODERM) 7 MG/24HR PATCH 24 HR  Apply 1 Patch to skin as directed every 24 hours.             omeprazole (PRILOSEC) 20 MG delayed-release capsule  Take 1 Cap by mouth every day.             omeprazole (PRILOSEC) 40 MG delayed-release capsule  Take 1 Cap by mouth every day.             oxycodone immediate release (ROXICODONE) 10 MG immediate release  tablet  Take 10 mg by mouth every 3 hours as needed for Moderate Pain.             potassium chloride SA (KDUR) 20 MEQ Tab CR  Take 1 Tab by mouth 2 Times a Day.             senna-docusate (PERICOLACE OR SENOKOT S) 8.6-50 MG Tab  Take 1 Tab by mouth every evening.             tiotropium (SPIRIVA) 18 MCG Cap  Inhale 1 Cap by mouth every day.             trazodone (DESYREL) 150 MG Tab  Take 300 mg by mouth every evening.                 DIET  Orders Placed This Encounter   Procedures   • DIET ORDER     Standing Status: Standing      Number of Occurrences: 1      Standing Expiration Date:      Order Specific Question:  Diet:     Answer:  Diabetic [3]     Order Specific Question:  Texture/Fiber modifications:     Answer:  Dysphagia 3(Mechanical Soft)specify fluid consistency(question 6) [3]      Comments:  no rice     Order Specific Question:  Consistency/Fluid modifications:     Answer:  Thin Liquids [3]       ACTIVITY  As tolerated and directed by skilled nursing.  Class 1 - no symptoms of any kind, and for whom ordinary physical activity does not cause fatigue, palpitations, dyspnea, or anginal pain.    LINES, DRAINS, AND WOUNDS  This is an automated list. Peripheral IVs will be removed prior to discharge.  PIV Group Left External Jugular 18g (Active)   Line Secured Taped;Transparent 6/9/2017  5:00 AM   Site Condition / Description Assessed;Patent;Clean;Dry;Intact 6/9/2017  5:00 AM   Dressing Type / Description Transparent 6/9/2017  5:00 AM   Dressing Status Observed 6/9/2017  5:00 AM   Saline Locked Yes 6/9/2017  5:00 AM   Infiltration Grading Used by [facility] [place] [place] 0 6/9/2017  5:00 AM   Phlebitis Scale (Used by [facility]) 0 6/9/2017  5:00 AM       Surgical Incision  Incision Left Ankle (Active)   Wound Bed [place] 6/8/2017  9:00 PM   Drainage  None 6/8/2017  9:00 PM   Periwound Skin Pink;Normal;Intact 6/8/2017  9:00 PM   Daily - Wound Closure Open to Air 6/8/2017  9:00 PM   Dressing Options Open to Air 6/8/2017  9:00 PM    Dressing Status / Change Not Applicable 6/8/2017  9:00 PM       Pressure Ulcer   Foot;Heel Left Lateral (Active)   Wound Bed Purple 6/8/2017  8:00 AM   Drainage  None 6/8/2017  9:00 PM   Periwound Skin Pink 6/8/2017  9:00 PM   Dressing Options Open to Air 6/8/2017  9:00 PM   Dressing Status / Change Not Applicable 6/8/2017  9:00 PM   Dressing Cleansing/Solutions Not Applicable 6/8/2017  9:00 PM   Periwound Protectant Not Applicable 6/8/2017  9:00 PM   Weekly Photo (Inpatient Only) 06/06/17 6/5/2017  9:00 PM                  MENTAL STATUS ON TRANSFER  Level of Consciousness: Alert  Orientation : Oriented x 4  Speech: Speech Clear    CONSULTATIONS  -Pulmonary    PROCEDURES  -Intubation, central line placement, extubation    CTA chest-  1.  No evidence pulmonary emboli.  2.  Multiple patchy nodular opacities scattered throughout both lungs predominantly right lung as compared to the left. Differential considerations include multifocal pneumonia. Septic emboli not excluded.  3.  Diffuse bilateral interstitial and groundglass opacities consistent with pneumonitis/edema.  4.  Bilateral lower lobe atelectasis. Small bilateral pleural effusions.  5.  Mediastinal and hilar adenopathy.    LE venous Duplex-  Bilateral lower extremity venous duplex imaging.    The following venous structures were evaluated: common femoral, profunda    femoral, greater saphenous, femoral, popliteal , peroneal and posterior    tibial veins.    Serial compression, augmentation maneuvers,  color and spectral Doppler    flow evaluations were performed.      FINDINGS:   Bilateral lower extremities -.    Complete color filling and compressibility with normal venous flow dynamics    including spontaneous flow, response to augmentation maneuvers, and    respiratory phasicity.    No superficial or deep venous thrombosis bilaterally.     Carotid duplex-   <50% bilateral ICA stenoses   moderate bilateral subclavian stenoses   nl vertebrals    CXR-    1.   Satisfactory endotracheal tube and right IJV line positioning.    2.  Right lung and left perihilar pneumonia.    LABORATORY  Lab Results   Component Value Date/Time    SODIUM 139 06/09/2017 04:53 AM    POTASSIUM 4.2 06/09/2017 04:53 AM    CHLORIDE 103 06/09/2017 04:53 AM    CO2 31 06/09/2017 04:53 AM    GLUCOSE 111* 06/09/2017 04:53 AM    BUN 17 06/09/2017 04:53 AM    CREATININE 1.21 06/09/2017 04:53 AM        Lab Results   Component Value Date/Time    WBC 9.6 06/09/2017 04:53 AM    HEMOGLOBIN 7.3* 06/09/2017 04:53 AM    HEMATOCRIT 24.3* 06/09/2017 04:53 AM    PLATELET COUNT 227 06/09/2017 04:53 AM        Total time of the discharge process exceeds 45 minutes.

## 2017-06-09 NOTE — PROGRESS NOTES
Assumed care of patient @ 0700, report received at bedside,  assessment done, labs and orders noted.  Pt A & Ox4, PIV (EJ)  saline locked, .  Pt is resting comfortably in bed, no signs or symptoms of distress.  Pt reports pain is a 5/10, declines intervention at this time.  Pt has been updated on the plan of care.    Bed alarm is off, bed is in lowest position, fall risk socks in place, call light within reach. Pt verbalized all needs are met at this time.

## 2017-06-09 NOTE — PROGRESS NOTES
"Patient aware of new transfusion orders placed. Patient verbalized understanding and all questions/concerns addressed and answered appropriately. Patient educated on s/s of transfusion reaction and reminded to notify staff immediately if she experiences any. Patient verbalized understanding of teaching; stated \"I've had transfusions before.\" Reinforcement teaching will continue to be implemented by RN throughout blood transfusion process. Consent for blood signed by day RN and placed in patient's chart. Pre-transfusion VS taken by this RN. First unit of PRBCs verified by two RNs and transfusion started at 1900. VS monitored q15 minutes x2 by RN. VS remained stable for first 30 minutes of blood transfusion; patient tolerated well with no s/s of suspected reaction present. Primary RN updated and aware.  "

## 2017-06-09 NOTE — DISCHARGE PLANNING
Received call from Marion at [facility], wants to know if pt is ready to come back to them. CTTM Lorri notified.

## 2017-06-09 NOTE — DISCHARGE SUMMARY
Transportation has been arranged with Marion at [facility]. Pt's transport is scheduled for 3:00 today via [facility]. Pt will discharge to [facility] notified.

## 2017-06-09 NOTE — PROGRESS NOTES
Pulmonary Critical Care Progress Note      Date of Service: 6/9/2017    Chief Complaint: Worsening shortness of breath and altered mental status    History of Present Illness:  Please notes that the patient was sedated and intubated prior to obtaining history and all history was obtained from old chart records as well as the ER history physician, Dr. Bae.  The patient is a 66-year-old female who was receiving rehabilitation at the [facility] after undergoing an ankle fracture several weeks ago, who developed urinary tract infection symptoms several days ago and was being treated for a UTI.  Apparently, she had fevers up to 102.  However, over the course of the evening, the patient started having worsening shortness of breath and altered mental status.  Due to worsening symptoms of shortness of breath as well as continued altered mental status, it was decided to send the patient to the emergency department.  Upon arrival to the emergency department, the patient continued to be unresponsive and the decision to intubate was made.  Once the patient was intubated and a central line was    placed.  The patient immediately had drops in her blood pressure into the 60s and 70s and based on her chest x-ray, they thought that perhaps she had healthcare-acquired pneumonia and was started on the sepsis protocol with 4 liters of normal saline boluses as well as vasopressor therapy.    ROS:  Unable to obtain as the patient is intubated and sedated    Interval Events:  24 hour interval history reviewed   Doing well. Almost at baseline in term of breathing   No adverse events overnight     PFSH:  No change.    Physical Exam:  General:  Alert, oriented.   HEENT: Normocephalic, atraumatic, PERRL.   CVS:  Bradycardic, regular rhythm.  Respiratory:  Diminished breath sounds.   Abdomen:  Soft.  Extremities:  Without edema. Left leg with walking boot.  Neuro/Psych:  Normal affect and behavior.     Respiratory:      Pulse Oximetry: 91 %  ImagingAvailable data reviewed   CXR reviewed with team        Invalid input(s): [id number]  HemoDynamics:  Pulse: 60  Blood Pressure : (!) 117/35 mmHg    Imaging: Available data reviewed         Neuro:  GCS    Imaging: Available data reviewed    Fluids:  Intake/Output       17 - 17 0659 17 0700 - 17 0659 17 07 - 06/10/17 0659       1066-8535 Total  1543-3358 Total 2436-7593 4356-3136 Total       Intake    P.O.  545  -- 545  480  -- 480  --  -- --    P.O. 545 -- 545 480 -- 480 -- -- --    Blood  --  -- --  350  -- 350  --  -- --    Volume (RELEASE RED BLOOD CELLS) -- -- -- 350 -- 350 -- -- --    Total Intake 545 -- 545 830 -- 830 -- -- --       Output    Urine  3600  -- 3600  --  -- --  --  -- --    Number of Times Voided -- -- -- 4 x -- 4 x -- -- --    Indwelling Cathether 3600 -- 3600 -- -- -- -- -- --    Stool  --  -- --  --  -- --  --  -- --    Number of Times Stooled -- 0 x 0 x 2 x 1 x 3 x -- -- --    Total Output 3600 -- 3600 -- -- -- -- -- --       Net I/O     -3055 -- -3055 830 -- 830 -- -- --           Recent Labs      17   0453   SODIUM  139  141  139   POTASSIUM  4.0  4.0  4.2   CHLORIDE  102  102  103   CO2  30  32  31   BUN  22  18  17   CREATININE  1.25  1.16  1.21   MAGNESIUM   --    --   1.7   CALCIUM  9.0  8.4*  8.7     GI/Nutrition:  Imaging: Available data reviewed  NPO     Liver Function:  Recent Labs      17   0453   GLUCOSE  99  119*  111*     Heme:  Recent Labs      1730  17   0453   RBC  2.62*  2.49*  2.59*   HEMOGLOBIN  7.5*  7.0*  7.3*   HEMATOCRIT  24.3*  23.6*  24.3*   PLATELETCT  190  204  227     Infectious Disease:  Temp  Av.4 °C (97.5 °F)  Min: 36.1 °C (97 °F)  Max: 36.6 °C (97.9 °F)  Micro: reviewed   Recent Labs      17   0453   WBC  7.7  8.5   9.6   NEUTSPOLYS  69.30  77.00*  72.40*   LYMPHOCYTES  15.30*  11.50*  12.80*   MONOCYTES  8.90  7.10  8.60   EOSINOPHILS  3.80  2.60  3.30   BASOPHILS  0.70  0.90  0.60     Current Facility-Administered Medications   Medication Dose Frequency Provider Last Rate Last Dose   • [START ON 6/10/2017] furosemide (LASIX) tablet 20 mg  20 mg Q DAY Lucian Fung M.D.       • cyclobenzaprine (FLEXERIL) tablet 10 mg  10 mg TID PRN Roger Ahmadi M.D.   10 mg at 06/08/17 2045   • loperamide (IMODIUM) capsule 2 mg  2 mg 4X/DAY PRN Lucian Fung M.D.   2 mg at 06/07/17 1354   • omeprazole (PRILOSEC) capsule 40 mg  40 mg DAILY Lucian Fung M.D.   40 mg at 06/09/17 0838   • potassium chloride SA (Kdur) tablet 20 mEq  20 mEq BID Lucian Fung M.D.   20 mEq at 06/09/17 0839   • trazodone (DESYREL) tablet 300 mg  300 mg QHS Roger Ahmadi M.D.   300 mg at 06/08/17 2045   • amlodipine (NORVASC) tablet 10 mg  10 mg Q DAY William Hope M.D.   10 mg at 06/09/17 0838   • carvedilol (COREG) tablet 3.125 mg  3.125 mg BID WITH MEALS William Hope M.D.   3.125 mg at 06/09/17 0839   • insulin lispro (HUMALOG) injection 3-14 Units  3-14 Units 4X/DAY RUSS Cordero M.D.   Stopped at 06/09/17 0830   • levothyroxine (SYNTHROID) tablet 75 mcg  75 mcg AM ES Juliocesar Ramon, PHARMD   75 mcg at 06/09/17 0638   • albuterol inhaler 2 Puff  2 Puff Q4HRS PRALLYSSA Vela M.D.   2 Puff at 06/04/17 1234   • budesonide-formoterol (SYMBICORT) 160-4.5 MCG/ACT inhaler 2 Puff  2 Puff BID Sierra Vela M.D.   2 Puff at 06/09/17 0841   • tiotropium (SPIRIVA) 18 MCG inhalation capsule 1 Cap  1 Cap DAILY Sierra Vela M.D.   1 Cap at 06/09/17 1015   • Respiratory Care per Protocol   Continuous RT Sierra Vela M.D.       • duloxetine (CYMBALTA) capsule 60 mg  60 mg DAILY ROBY Loja.O.   60 mg at 06/09/17 0838   • gabapentin (NEURONTIN) capsule 300 mg  300 mg BID GHULAM LojaO.   300 mg at 06/09/17 0838    • insulin glargine (LANTUS) injection 20 Units  20 Units Nightly ROBY Loja.O.   20 Units at 06/08/17 2051   • montelukast (SINGULAIR) tablet 10 mg  10 mg Q EVENING ROBY Loja.O.   10 mg at 06/08/17 2045   • polyethylene glycol/lytes (MIRALAX) PACKET 1 Packet  1 Packet QDAY PRN Angel Huynh D.O.        And   • magnesium hydroxide (MILK OF MAGNESIA) suspension 30 mL  30 mL QDAY PRN GHULAM LojaOMelonie        And   • bisacodyl (DULCOLAX) suppository 10 mg  10 mg QDAY PRN GHULAM LojaO.       • acetaminophen (TYLENOL) tablet 650 mg  650 mg Q6HRS PRN ROBY Loja.O.   650 mg at 06/06/17 1518   • glucose 4 g chewable tablet 16 g  16 g Q15 MIN PRN GHULAM LojaOMelonie        And   • dextrose 50% (D50W) injection 25 mL  25 mL Q15 MIN PRN ROBY Loja.O.       • ondansetron (ZOFRAN) syringe/vial injection 4 mg  4 mg Q4HRS PRN GHULAM LojaO.       • ondansetron (ZOFRAN ODT) dispertab 4 mg  4 mg Q4HRS PRN ROBY Loja.O.       • oxycodone immediate release (ROXICODONE) tablet 10 mg  10 mg Q3HRS PRN ROBY Loja.O.   10 mg at 06/09/17 1014   • acetaminophen (TYLENOL) suppository 650 mg  650 mg Q6HRS PRN ROBY Loja.O.   650 mg at 06/02/17 1255     Last reviewed on 6/5/2017 11:55 AM by Fermin Michael Ass't    Quality  Measures:  Labs reviewed, Medications reviewed, Radiology images reviewed and EKG reviewed  Mckeon catheter: Critically Ill - Requiring Accurate Measurement of Urinary Output  Central line in place: Need for access, Vasopressors, Sepsis and Shock    DVT Prophylaxis: Heparin  DVT prophylaxis - mechanical: SCDs  Ulcer prophylaxis: Yes  Antibiotics: Treating active infection/contamination beyond 24 hours perioperative coverage  Assessed for rehab: Patient was assess for and/or received rehabilitation services during this hospitalization    Lines:  Right IJ 6/2    Gtts:  None    Abx:  Day 4 Vancomycin  Day 4 Zosyn     Ucx 6/2 negative  Bcx 6/2 negative  Scx 6/2  negative     Echo 3/16 EF 60%, RVSP 60.   LE Doppler 6/4 (-) for dvt    Problems/Plan:  Acute hypoxic respiratory failure.   - extubated on 6/3   - cont RT/O2 protocols   - diuresing well   - encourage IS/PEP  Severe sepsis with septic shock, likely due to pulmonary source.   - s/p sepsis protocol   - lactates normalized after IVF   - de escalate abx   - follow cultures  Healthcare-acquired pneumonia.   - follow cultures   - change abx to unasyn  History of smoking in the past. Probably has component of COPD. She will need PFTs  Patient is scheduled to be seen by pulmonary as outpatient   Con O2, BDs inhaled steroids   Pulmonary will sign off. Please call for any questions

## 2017-06-09 NOTE — PROGRESS NOTES
Received call from ER charge; another ER RN able to come up to unit shortly. Primary RN and patient updated.

## 2017-06-09 NOTE — PROGRESS NOTES
ER charge called regarding placement of EJ IV; states unable to at this time. Will check with other ER RN's. Primary RN aware.

## 2017-06-09 NOTE — DISCHARGE INSTRUCTIONS
Discharge Instructions    Discharged to other by [facility] with self. Discharged via wheelchair, hospital escort: Yes.  Special equipment needed: Oxygen    Be sure to schedule a follow-up appointment with your primary care doctor or any specialists as instructed.     Discharge Plan:   Diet Plan: Discussed  Activity Level: Discussed  Smoking Cessation Offered: Patient Refused  Confirmed Follow up Appointment: Appointment Scheduled  Confirmed Symptoms Management: Discussed  Medication Reconciliation Updated: Yes  Influenza Vaccine Indication: Indicated: Not available from distributor/    I understand that a diet low in cholesterol, fat, and sodium is recommended for good health. Unless I have been given specific instructions below for another diet, I accept this instruction as my diet prescription.   Other diet: 2g sodium diet/ADA  Dysphagia 3 with thin liquids    Special Instructions:   HF Patient Discharge Instructions  · Monitor your weight daily, and maintain a weight chart, to track your weight changes.   · Activity as tolerated, unless your Doctor has ordered otherwise. Other activity order:   · .  · Follow a low fat, low cholesterol, low salt diet unless instructed otherwise by your Doctor. Read the labels on the back of food products and track your intake of fat, cholesterol and salt.   · Fluid Restriction No. If a Fluid Restriction has been ordered by your Doctor, measure fluids with a measuring cup to ensure that you are not exceeding the restriction.   · No smoking.  · Oxygen Yes. If your Doctor has ordered that you wear Oxygen at home, it is important to wear it as ordered.  · Did you receive an explanation from staff on the importance of taking each of your medications and why it is necessary to keep taking them unless your doctor says to stop? Yes  · Were all of your questions answered about how to manage your heart failure and what to do if you have increased signs and symptoms after you go  home? Yes  · Do you feel like your heart failure care team involved you in the care treatment plan and allowed you to make decisions regarding your care while in the hospital and addressed any discharge needs you might have? Yes    See the educational handout provided at discharge for more information on monitoring your daily weight, activity and diet. This also explains more about Heart Failure, symptoms of a flare-up and some of the tests that you have undergone.     Warning Signs of a Flare-Up include:  · Swelling in the ankles or lower legs.  · Shortness of breath, while at rest, or while doing normal activities.   · Shortness of breath at night when in bed, or coughing in bed.   · Requiring more pillows to sleep at night, or needing to sit up at night to sleep.  · Feeling weak, dizzy or fatigued.     When to call your Doctor:  · Call [facility] seven days a week from 8:00 a.m. to 8:00 p.m. for medical questions [phone].  · Call your Primary Care Physician or Cardiologist if:   1. You experience any pain radiating to your jaw or neck.  2. You have any difficulty breathing.  3. You experience weight gain of 3 lbs in a day or 5 lbs in a week.   4. You feel any palpitations or irregular heartbeats.  5. You become dizzy or lose consciousness.   If you have had an angiogram or had a pacemaker or AICD placed, and experience:  1. Bleeding, drainage or swelling at the surgical / puncture site.  2. Fever greater than 100.0 F  3. Shock from internal defibrillator.  4. Cool and / or numb extremities.      · Is patient discharged on Warfarin / Coumadin?   No     · Is patient Post Blood Transfusion?  Yes  POST BLOOD TRANSFUSION INFORMATION (ADULT)    The purpose of blood transfusion is to correct anemia, low levels of blood clotting factors or to correct acute blood loss.   Blood transfusion is very safe but occasionally unexpected adverse reactions do occur. Most adverse reactions occur during transfusion,  within one to two days following transfusion or one to two weeks following transfusion. Some adverse reactions can occur one to six months after transfusion and some even years later.             If any of the symptoms listed below happen to you during your transfusion,                                 please notify your nurse immediately.   · Fever or Chills  · Flushing of the Face  · Hives, rash or itching  · Difficulty in breathing or shortness of breath  · Pain or oozing of blood from the IV needle site  · Low back pain  · Nausea or vomiting  · Weakness or fainting  · Chest pain  · Blood in the urine  · Decreased frequency of urination    The above symptoms may also occur within 24 to 48 after transfusion; if so, notify your physician.     · Yellowing of the skin    This can happen one to six months after transfusion; if so, notify your physician    Depression / Suicide Risk    As you are discharged from this [facility] facility, it is important to learn how to keep safe from harming yourself.    Recognize the warning signs:  · Abrupt changes in personality, positive or negative- including increase in energy   · Giving away possessions  · Change in eating patterns- significant weight changes-  positive or negative  · Change in sleeping patterns- unable to sleep or sleeping all the time   · Unwillingness or inability to communicate  · Depression  · Unusual sadness, discouragement and loneliness  · Talk of wanting to die  · Neglect of personal appearance   · Rebelliousness- reckless behavior  · Withdrawal from people/activities they love  · Confusion- inability to concentrate     If you or a loved one observes any of these behaviors or has concerns about self-harm, here's what you can do:  · Talk about it- your feelings and reasons for harming yourself  · Remove any means that you might use to hurt yourself (examples: pills, rope, extension cords, firearm)  · Get professional help from the community (Mental  Health, Substance Abuse, psychological counseling)  · Do not be alone:Call your Safe Contact- someone whom you trust who will be there for you.  · Call your local CRISIS HOTLINE 322-9423 or 115-543-6154  · Call your local Children's Mobile Crisis Response Team [place] (650) 694-3645 or www.ThinkGrid  · Call the toll free National Suicide Prevention Hotlines   · National Suicide Prevention Lifeline 892-304-POTX (1495)  · National Hope Line Network 800-SUICIDE (969-9974)

## 2017-06-09 NOTE — CARE PLAN
Problem: Pain Management  Goal: Pain level will decrease to patient’s comfort goal  Intervention: Follow pain managment plan developed in collaboration with patient and Interdisciplinary Team  Pt administered oxy 10 as per MAR, pt reports relief achieved with this medication      Problem: Mobility  Goal: Risk for activity intolerance will decrease  Intervention: Encourage patient to increase activity level in collaboration with Interdisciplinary Team  Pt is encouraged to ambulate TID as tolerated with WBAT with FWW and bo boot

## 2017-06-10 ENCOUNTER — HOSPITAL ENCOUNTER (OUTPATIENT)
Dept: LAB | Facility: MEDICAL CENTER | Age: 67
End: 2017-06-10
Attending: INTERNAL MEDICINE
Payer: COMMERCIAL

## 2017-06-10 LAB
ANION GAP SERPL CALC-SCNC: 8 MMOL/L (ref 0–11.9)
BASOPHILS # BLD AUTO: 0.5 % (ref 0–1.8)
BASOPHILS # BLD: 0.04 K/UL (ref 0–0.12)
BUN SERPL-MCNC: 16 MG/DL (ref 8–22)
CALCIUM SERPL-MCNC: 8.9 MG/DL (ref 8.5–10.5)
CHLORIDE SERPL-SCNC: 103 MMOL/L (ref 96–112)
CO2 SERPL-SCNC: 29 MMOL/L (ref 20–33)
CREAT SERPL-MCNC: 1.47 MG/DL (ref 0.5–1.4)
EOSINOPHIL # BLD AUTO: 0.31 K/UL (ref 0–0.51)
EOSINOPHIL NFR BLD: 3.6 % (ref 0–6.9)
ERYTHROCYTE [DISTWIDTH] IN BLOOD BY AUTOMATED COUNT: 59.6 FL (ref 35.9–50)
GFR SERPL CREATININE-BSD FRML MDRD: 35 ML/MIN/1.73 M 2
GLUCOSE SERPL-MCNC: 135 MG/DL (ref 65–99)
HCT VFR BLD AUTO: 23.9 % (ref 37–47)
HGB BLD-MCNC: 7.3 G/DL (ref 12–16)
IMM GRANULOCYTES # BLD AUTO: 0.23 K/UL (ref 0–0.11)
IMM GRANULOCYTES NFR BLD AUTO: 2.6 % (ref 0–0.9)
LYMPHOCYTES # BLD AUTO: 1.24 K/UL (ref 1–4.8)
LYMPHOCYTES NFR BLD: 14.3 % (ref 22–41)
MCH RBC QN AUTO: 28.9 PG (ref 27–33)
MCHC RBC AUTO-ENTMCNC: 30.5 G/DL (ref 33.6–35)
MCV RBC AUTO: 94.5 FL (ref 81.4–97.8)
MONOCYTES # BLD AUTO: 0.87 K/UL (ref 0–0.85)
MONOCYTES NFR BLD AUTO: 10 % (ref 0–13.4)
NEUTROPHILS # BLD AUTO: 6.01 K/UL (ref 2–7.15)
NEUTROPHILS NFR BLD: 69 % (ref 44–72)
NRBC # BLD AUTO: 0 K/UL
NRBC BLD AUTO-RTO: 0 /100 WBC
PLATELET # BLD AUTO: 234 K/UL (ref 164–446)
PMV BLD AUTO: 9.8 FL (ref 9–12.9)
POTASSIUM SERPL-SCNC: 4.5 MMOL/L (ref 3.6–5.5)
RBC # BLD AUTO: 2.53 M/UL (ref 4.2–5.4)
SODIUM SERPL-SCNC: 140 MMOL/L (ref 135–145)
WBC # BLD AUTO: 8.7 K/UL (ref 4.8–10.8)

## 2017-06-10 NOTE — PROGRESS NOTES
Pt left with all belongings via wheelchair  On 4 LPM O2 with transport to [facility].  Pt had discharge instructions in hand with heart failure packet and was provided with heart failure education upon discharge.   Pt verbalized understanding.   Transport had a copy of discharge instructions and cobra.   Pt EJ/PIV was discontinued, tip was intact.  Occlusive pressure dressing placed on pt, and pressure was placed on site for 5 minutes prior to discharge.

## 2017-06-12 LAB
ANION GAP SERPL CALC-SCNC: 7 MMOL/L (ref 0–11.9)
BASOPHILS # BLD AUTO: 0.5 % (ref 0–1.8)
BASOPHILS # BLD: 0.06 K/UL (ref 0–0.12)
BUN SERPL-MCNC: 22 MG/DL (ref 8–22)
CALCIUM SERPL-MCNC: 9 MG/DL (ref 8.5–10.5)
CHLORIDE SERPL-SCNC: 101 MMOL/L (ref 96–112)
CO2 SERPL-SCNC: 30 MMOL/L (ref 20–33)
CREAT SERPL-MCNC: 1.77 MG/DL (ref 0.5–1.4)
EOSINOPHIL # BLD AUTO: 0.26 K/UL (ref 0–0.51)
EOSINOPHIL NFR BLD: 2.4 % (ref 0–6.9)
ERYTHROCYTE [DISTWIDTH] IN BLOOD BY AUTOMATED COUNT: 58.1 FL (ref 35.9–50)
GFR SERPL CREATININE-BSD FRML MDRD: 29 ML/MIN/1.73 M 2
GLUCOSE SERPL-MCNC: 181 MG/DL (ref 65–99)
HCT VFR BLD AUTO: 24.2 % (ref 37–47)
HGB BLD-MCNC: 7.2 G/DL (ref 12–16)
IMM GRANULOCYTES # BLD AUTO: 0.19 K/UL (ref 0–0.11)
IMM GRANULOCYTES NFR BLD AUTO: 1.7 % (ref 0–0.9)
LYMPHOCYTES # BLD AUTO: 1.41 K/UL (ref 1–4.8)
LYMPHOCYTES NFR BLD: 12.8 % (ref 22–41)
MCH RBC QN AUTO: 28.1 PG (ref 27–33)
MCHC RBC AUTO-ENTMCNC: 29.8 G/DL (ref 33.6–35)
MCV RBC AUTO: 94.5 FL (ref 81.4–97.8)
MONOCYTES # BLD AUTO: 0.76 K/UL (ref 0–0.85)
MONOCYTES NFR BLD AUTO: 6.9 % (ref 0–13.4)
NEUTROPHILS # BLD AUTO: 8.37 K/UL (ref 2–7.15)
NEUTROPHILS NFR BLD: 75.7 % (ref 44–72)
NRBC # BLD AUTO: 0.02 K/UL
NRBC BLD AUTO-RTO: 0.2 /100 WBC
PLATELET # BLD AUTO: 274 K/UL (ref 164–446)
PMV BLD AUTO: 9.3 FL (ref 9–12.9)
POTASSIUM SERPL-SCNC: 4.9 MMOL/L (ref 3.6–5.5)
RBC # BLD AUTO: 2.56 M/UL (ref 4.2–5.4)
SODIUM SERPL-SCNC: 138 MMOL/L (ref 135–145)
WBC # BLD AUTO: 11.1 K/UL (ref 4.8–10.8)

## 2017-06-13 ENCOUNTER — APPOINTMENT (OUTPATIENT)
Dept: RADIOLOGY | Facility: IMAGING CENTER | Age: 67
End: 2017-06-13
Attending: FAMILY MEDICINE
Payer: MEDICARE

## 2017-06-13 DIAGNOSIS — J44.9 CHRONIC OBSTRUCTIVE PULMONARY DISEASE, UNSPECIFIED COPD TYPE (HCC): ICD-10-CM

## 2017-06-13 LAB
APPEARANCE UR: CLEAR
BASOPHILS # BLD AUTO: 0.6 % (ref 0–1.8)
BASOPHILS # BLD: 0.07 K/UL (ref 0–0.12)
BILIRUB UR QL STRIP.AUTO: NEGATIVE
COLOR UR: ABNORMAL
CULTURE IF INDICATED INDCX: NO UA CULTURE
EOSINOPHIL # BLD AUTO: 0.36 K/UL (ref 0–0.51)
EOSINOPHIL NFR BLD: 3.2 % (ref 0–6.9)
EPI CELLS #/AREA URNS HPF: NORMAL /HPF
ERYTHROCYTE [DISTWIDTH] IN BLOOD BY AUTOMATED COUNT: 57.4 FL (ref 35.9–50)
GLUCOSE UR STRIP.AUTO-MCNC: NEGATIVE MG/DL
HCT VFR BLD AUTO: 25.7 % (ref 37–47)
HGB BLD-MCNC: 7.8 G/DL (ref 12–16)
IMM GRANULOCYTES # BLD AUTO: 0.21 K/UL (ref 0–0.11)
IMM GRANULOCYTES NFR BLD AUTO: 1.8 % (ref 0–0.9)
KETONES UR STRIP.AUTO-MCNC: NEGATIVE MG/DL
LEUKOCYTE ESTERASE UR QL STRIP.AUTO: NEGATIVE
LYMPHOCYTES # BLD AUTO: 1.78 K/UL (ref 1–4.8)
LYMPHOCYTES NFR BLD: 15.7 % (ref 22–41)
MCH RBC QN AUTO: 28.6 PG (ref 27–33)
MCHC RBC AUTO-ENTMCNC: 30.4 G/DL (ref 33.6–35)
MCV RBC AUTO: 94.1 FL (ref 81.4–97.8)
MICRO URNS: ABNORMAL
MONOCYTES # BLD AUTO: 0.86 K/UL (ref 0–0.85)
MONOCYTES NFR BLD AUTO: 7.6 % (ref 0–13.4)
NEUTROPHILS # BLD AUTO: 8.09 K/UL (ref 2–7.15)
NEUTROPHILS NFR BLD: 71.1 % (ref 44–72)
NITRITE UR QL STRIP.AUTO: NEGATIVE
NRBC # BLD AUTO: 0 K/UL
NRBC BLD AUTO-RTO: 0 /100 WBC
PH UR STRIP.AUTO: 7 [PH]
PLATELET # BLD AUTO: 272 K/UL (ref 164–446)
PMV BLD AUTO: 9.1 FL (ref 9–12.9)
PROT UR QL STRIP: 50 MG/DL
RBC # BLD AUTO: 2.73 M/UL (ref 4.2–5.4)
RBC UR QL AUTO: NEGATIVE
SP GR UR STRIP.AUTO: 1
WBC # BLD AUTO: 11.4 K/UL (ref 4.8–10.8)
WBC #/AREA URNS HPF: NORMAL /HPF

## 2017-06-14 LAB
ANION GAP SERPL CALC-SCNC: 15 MMOL/L (ref 0–11.9)
BASOPHILS # BLD AUTO: 0.4 % (ref 0–1.8)
BASOPHILS # BLD: 0.05 K/UL (ref 0–0.12)
BUN SERPL-MCNC: 27 MG/DL (ref 8–22)
CALCIUM SERPL-MCNC: 9.2 MG/DL (ref 8.5–10.5)
CHLORIDE SERPL-SCNC: 105 MMOL/L (ref 96–112)
CO2 SERPL-SCNC: 22 MMOL/L (ref 20–33)
CREAT SERPL-MCNC: 1.46 MG/DL (ref 0.5–1.4)
EOSINOPHIL # BLD AUTO: 0.35 K/UL (ref 0–0.51)
EOSINOPHIL NFR BLD: 3.1 % (ref 0–6.9)
ERYTHROCYTE [DISTWIDTH] IN BLOOD BY AUTOMATED COUNT: 56.4 FL (ref 35.9–50)
GFR SERPL CREATININE-BSD FRML MDRD: 36 ML/MIN/1.73 M 2
GLUCOSE SERPL-MCNC: 150 MG/DL (ref 65–99)
HCT VFR BLD AUTO: 25.1 % (ref 37–47)
HGB BLD-MCNC: 7.7 G/DL (ref 12–16)
IMM GRANULOCYTES # BLD AUTO: 0.15 K/UL (ref 0–0.11)
IMM GRANULOCYTES NFR BLD AUTO: 1.3 % (ref 0–0.9)
LYMPHOCYTES # BLD AUTO: 1.57 K/UL (ref 1–4.8)
LYMPHOCYTES NFR BLD: 13.8 % (ref 22–41)
MCH RBC QN AUTO: 28.7 PG (ref 27–33)
MCHC RBC AUTO-ENTMCNC: 30.7 G/DL (ref 33.6–35)
MCV RBC AUTO: 93.7 FL (ref 81.4–97.8)
MONOCYTES # BLD AUTO: 0.69 K/UL (ref 0–0.85)
MONOCYTES NFR BLD AUTO: 6.1 % (ref 0–13.4)
NEUTROPHILS # BLD AUTO: 8.53 K/UL (ref 2–7.15)
NEUTROPHILS NFR BLD: 75.3 % (ref 44–72)
NRBC # BLD AUTO: 0 K/UL
NRBC BLD AUTO-RTO: 0 /100 WBC
PLATELET # BLD AUTO: 331 K/UL (ref 164–446)
PMV BLD AUTO: 9.2 FL (ref 9–12.9)
POTASSIUM SERPL-SCNC: 5.1 MMOL/L (ref 3.6–5.5)
RBC # BLD AUTO: 2.68 M/UL (ref 4.2–5.4)
SODIUM SERPL-SCNC: 142 MMOL/L (ref 135–145)
WBC # BLD AUTO: 11.3 K/UL (ref 4.8–10.8)

## 2017-06-15 LAB
ANION GAP SERPL CALC-SCNC: 7 MMOL/L (ref 0–11.9)
ANISOCYTOSIS BLD QL SMEAR: ABNORMAL
BASOPHILS # BLD AUTO: 1.8 % (ref 0–1.8)
BASOPHILS # BLD: 0.23 K/UL (ref 0–0.12)
BUN SERPL-MCNC: 29 MG/DL (ref 8–22)
CALCIUM SERPL-MCNC: 8.9 MG/DL (ref 8.5–10.5)
CHLORIDE SERPL-SCNC: 106 MMOL/L (ref 96–112)
CO2 SERPL-SCNC: 29 MMOL/L (ref 20–33)
CREAT SERPL-MCNC: 1.63 MG/DL (ref 0.5–1.4)
EOSINOPHIL # BLD AUTO: 0 K/UL (ref 0–0.51)
EOSINOPHIL NFR BLD: 0 % (ref 0–6.9)
ERYTHROCYTE [DISTWIDTH] IN BLOOD BY AUTOMATED COUNT: 57.1 FL (ref 35.9–50)
GFR SERPL CREATININE-BSD FRML MDRD: 31 ML/MIN/1.73 M 2
GLUCOSE SERPL-MCNC: 137 MG/DL (ref 65–99)
HCT VFR BLD AUTO: 25.2 % (ref 37–47)
HGB BLD-MCNC: 7.5 G/DL (ref 12–16)
LYMPHOCYTES # BLD AUTO: 1.03 K/UL (ref 1–4.8)
LYMPHOCYTES NFR BLD: 8.2 % (ref 22–41)
MANUAL DIFF BLD: NORMAL
MCH RBC QN AUTO: 28.1 PG (ref 27–33)
MCHC RBC AUTO-ENTMCNC: 29.8 G/DL (ref 33.6–35)
MCV RBC AUTO: 94.4 FL (ref 81.4–97.8)
MICROCYTES BLD QL SMEAR: ABNORMAL
MONOCYTES # BLD AUTO: 0.11 K/UL (ref 0–0.85)
MONOCYTES NFR BLD AUTO: 0.9 % (ref 0–13.4)
MORPHOLOGY BLD-IMP: NORMAL
MYELOCYTES NFR BLD MANUAL: 2.7 %
NEUTROPHILS # BLD AUTO: 10.69 K/UL (ref 2–7.15)
NEUTROPHILS NFR BLD: 85.5 % (ref 44–72)
NRBC # BLD AUTO: 0 K/UL
NRBC BLD AUTO-RTO: 0 /100 WBC
OVALOCYTES BLD QL SMEAR: NORMAL
PLATELET # BLD AUTO: 350 K/UL (ref 164–446)
PLATELET BLD QL SMEAR: NORMAL
PMV BLD AUTO: 9.2 FL (ref 9–12.9)
POIKILOCYTOSIS BLD QL SMEAR: NORMAL
POLYCHROMASIA BLD QL SMEAR: NORMAL
POTASSIUM SERPL-SCNC: 4.9 MMOL/L (ref 3.6–5.5)
PROMYELOCYTES NFR BLD MANUAL: 0.9 %
RBC # BLD AUTO: 2.67 M/UL (ref 4.2–5.4)
RBC BLD AUTO: PRESENT
SODIUM SERPL-SCNC: 142 MMOL/L (ref 135–145)
WBC # BLD AUTO: 12.5 K/UL (ref 4.8–10.8)

## 2017-06-17 LAB
ANION GAP SERPL CALC-SCNC: 7 MMOL/L (ref 0–11.9)
BASOPHILS # BLD AUTO: 0.5 % (ref 0–1.8)
BASOPHILS # BLD: 0.05 K/UL (ref 0–0.12)
BUN SERPL-MCNC: 33 MG/DL (ref 8–22)
CALCIUM SERPL-MCNC: 8.7 MG/DL (ref 8.5–10.5)
CHLORIDE SERPL-SCNC: 105 MMOL/L (ref 96–112)
CO2 SERPL-SCNC: 24 MMOL/L (ref 20–33)
CREAT SERPL-MCNC: 1.86 MG/DL (ref 0.5–1.4)
EOSINOPHIL # BLD AUTO: 0.32 K/UL (ref 0–0.51)
EOSINOPHIL NFR BLD: 3.3 % (ref 0–6.9)
ERYTHROCYTE [DISTWIDTH] IN BLOOD BY AUTOMATED COUNT: 56.9 FL (ref 35.9–50)
GFR SERPL CREATININE-BSD FRML MDRD: 27 ML/MIN/1.73 M 2
GLUCOSE SERPL-MCNC: 209 MG/DL (ref 65–99)
HCT VFR BLD AUTO: 23.9 % (ref 37–47)
HGB BLD-MCNC: 7.3 G/DL (ref 12–16)
IMM GRANULOCYTES # BLD AUTO: 0.1 K/UL (ref 0–0.11)
IMM GRANULOCYTES NFR BLD AUTO: 1 % (ref 0–0.9)
LYMPHOCYTES # BLD AUTO: 1.16 K/UL (ref 1–4.8)
LYMPHOCYTES NFR BLD: 12 % (ref 22–41)
MCH RBC QN AUTO: 28.6 PG (ref 27–33)
MCHC RBC AUTO-ENTMCNC: 30.5 G/DL (ref 33.6–35)
MCV RBC AUTO: 93.7 FL (ref 81.4–97.8)
MONOCYTES # BLD AUTO: 0.69 K/UL (ref 0–0.85)
MONOCYTES NFR BLD AUTO: 7.2 % (ref 0–13.4)
NEUTROPHILS # BLD AUTO: 7.31 K/UL (ref 2–7.15)
NEUTROPHILS NFR BLD: 76 % (ref 44–72)
NRBC # BLD AUTO: 0 K/UL
NRBC BLD AUTO-RTO: 0 /100 WBC
PLATELET # BLD AUTO: 306 K/UL (ref 164–446)
PMV BLD AUTO: 9.1 FL (ref 9–12.9)
POTASSIUM SERPL-SCNC: 5 MMOL/L (ref 3.6–5.5)
RBC # BLD AUTO: 2.55 M/UL (ref 4.2–5.4)
SODIUM SERPL-SCNC: 136 MMOL/L (ref 135–145)
WBC # BLD AUTO: 9.6 K/UL (ref 4.8–10.8)

## 2017-06-18 LAB
ANION GAP SERPL CALC-SCNC: 7 MMOL/L (ref 0–11.9)
ANISOCYTOSIS BLD QL SMEAR: ABNORMAL
BASOPHILS # BLD AUTO: 0.6 % (ref 0–1.8)
BASOPHILS # BLD: 0.06 K/UL (ref 0–0.12)
BUN SERPL-MCNC: 32 MG/DL (ref 8–22)
CALCIUM SERPL-MCNC: 8.7 MG/DL (ref 8.5–10.5)
CHLORIDE SERPL-SCNC: 104 MMOL/L (ref 96–112)
CO2 SERPL-SCNC: 26 MMOL/L (ref 20–33)
COMMENT 1642: NORMAL
CREAT SERPL-MCNC: 1.5 MG/DL (ref 0.5–1.4)
EOSINOPHIL # BLD AUTO: 0.36 K/UL (ref 0–0.51)
EOSINOPHIL NFR BLD: 3.4 % (ref 0–6.9)
ERYTHROCYTE [DISTWIDTH] IN BLOOD BY AUTOMATED COUNT: 56.4 FL (ref 35.9–50)
GFR SERPL CREATININE-BSD FRML MDRD: 35 ML/MIN/1.73 M 2
GLUCOSE SERPL-MCNC: 91 MG/DL (ref 65–99)
HCT VFR BLD AUTO: 24.6 % (ref 37–47)
HGB BLD-MCNC: 7.3 G/DL (ref 12–16)
HYPOCHROMIA BLD QL SMEAR: ABNORMAL
IMM GRANULOCYTES # BLD AUTO: 0.07 K/UL (ref 0–0.11)
IMM GRANULOCYTES NFR BLD AUTO: 0.7 % (ref 0–0.9)
LYMPHOCYTES # BLD AUTO: 1.3 K/UL (ref 1–4.8)
LYMPHOCYTES NFR BLD: 12.4 % (ref 22–41)
MACROCYTES BLD QL SMEAR: ABNORMAL
MCH RBC QN AUTO: 28 PG (ref 27–33)
MCHC RBC AUTO-ENTMCNC: 29.7 G/DL (ref 33.6–35)
MCV RBC AUTO: 94.3 FL (ref 81.4–97.8)
MONOCYTES # BLD AUTO: 0.68 K/UL (ref 0–0.85)
MONOCYTES NFR BLD AUTO: 6.5 % (ref 0–13.4)
MORPHOLOGY BLD-IMP: NORMAL
NEUTROPHILS # BLD AUTO: 8.02 K/UL (ref 2–7.15)
NEUTROPHILS NFR BLD: 76.4 % (ref 44–72)
NRBC # BLD AUTO: 0 K/UL
NRBC BLD AUTO-RTO: 0 /100 WBC
PLATELET # BLD AUTO: 329 K/UL (ref 164–446)
PLATELET BLD QL SMEAR: NORMAL
PMV BLD AUTO: 9.2 FL (ref 9–12.9)
POTASSIUM SERPL-SCNC: 5 MMOL/L (ref 3.6–5.5)
RBC # BLD AUTO: 2.61 M/UL (ref 4.2–5.4)
RBC BLD AUTO: PRESENT
SODIUM SERPL-SCNC: 137 MMOL/L (ref 135–145)
WBC # BLD AUTO: 10.5 K/UL (ref 4.8–10.8)

## 2017-06-19 ENCOUNTER — HOSPITAL ENCOUNTER (OUTPATIENT)
Dept: RADIOLOGY | Facility: MEDICAL CENTER | Age: 67
End: 2017-06-19
Attending: FAMILY MEDICINE
Payer: MEDICARE

## 2017-06-19 DIAGNOSIS — Z95.9 CARDIAC DEVICE IN SITU: ICD-10-CM

## 2017-06-19 PROCEDURE — C1751 CATH, INF, PER/CENT/MIDLINE: HCPCS

## 2017-06-19 PROCEDURE — 36569 INSJ PICC 5 YR+ W/O IMAGING: CPT

## 2017-06-19 NOTE — PROGRESS NOTES
PICC Insertion Final 3CG  Per telephone conversation with MELISSA Lopez for Dr. Hernández, they are aware of patient's Chronic Kidney disease stage III and have no plans for dialysis.  Approval to place PICC line given.       Consents confirmed, vessel patency confirmed with ultrasound. Risks and benefits of procedure explained to patient/family and education regarding central line associated bloodstream infections provided. Questions answered.     PICC placed in RUE per MD order with ultrasound guidance. 4 Fr, single lumen PICC placed in brachial vein after one attempt(s). 3 cc's of 1% lidocaine injected intradermally, 21 gauge microintroducer needle and modified Seldinger technique used. 44 cm catheter inserted with good blood return. Secured at 0 cm marker. Each lumen flushed without resistance with 10 mL 0.9% normal saline. PICC line secured with Biopatch and Tegaderm.    PICC placement is confirmed by nurse using 3CG technology. PICC line is appropriate for use at this time. Pt tolerated procedure well.  Patient condition relayed to unit RN or ordering physician via this post procedure note in the EMR.     [employer] PICC ref # [id number], Lot # [id number]

## 2017-06-20 LAB
ANION GAP SERPL CALC-SCNC: 6 MMOL/L (ref 0–11.9)
BASOPHILS # BLD AUTO: 0.6 % (ref 0–1.8)
BASOPHILS # BLD: 0.06 K/UL (ref 0–0.12)
BUN SERPL-MCNC: 27 MG/DL (ref 8–22)
CALCIUM SERPL-MCNC: 8.9 MG/DL (ref 8.5–10.5)
CHLORIDE SERPL-SCNC: 104 MMOL/L (ref 96–112)
CO2 SERPL-SCNC: 26 MMOL/L (ref 20–33)
CREAT SERPL-MCNC: 1.35 MG/DL (ref 0.5–1.4)
EOSINOPHIL # BLD AUTO: 0.38 K/UL (ref 0–0.51)
EOSINOPHIL NFR BLD: 4 % (ref 0–6.9)
ERYTHROCYTE [DISTWIDTH] IN BLOOD BY AUTOMATED COUNT: 57.9 FL (ref 35.9–50)
GFR SERPL CREATININE-BSD FRML MDRD: 39 ML/MIN/1.73 M 2
GLUCOSE SERPL-MCNC: 111 MG/DL (ref 65–99)
GRAM STN SPEC: NORMAL
HCT VFR BLD AUTO: 24.9 % (ref 37–47)
HGB BLD-MCNC: 7.5 G/DL (ref 12–16)
IMM GRANULOCYTES # BLD AUTO: 0.08 K/UL (ref 0–0.11)
IMM GRANULOCYTES NFR BLD AUTO: 0.8 % (ref 0–0.9)
LYMPHOCYTES # BLD AUTO: 1.25 K/UL (ref 1–4.8)
LYMPHOCYTES NFR BLD: 13.2 % (ref 22–41)
MCH RBC QN AUTO: 28.3 PG (ref 27–33)
MCHC RBC AUTO-ENTMCNC: 30.1 G/DL (ref 33.6–35)
MCV RBC AUTO: 94 FL (ref 81.4–97.8)
MONOCYTES # BLD AUTO: 0.71 K/UL (ref 0–0.85)
MONOCYTES NFR BLD AUTO: 7.5 % (ref 0–13.4)
NEUTROPHILS # BLD AUTO: 6.97 K/UL (ref 2–7.15)
NEUTROPHILS NFR BLD: 73.9 % (ref 44–72)
NRBC # BLD AUTO: 0 K/UL
NRBC BLD AUTO-RTO: 0 /100 WBC
PLATELET # BLD AUTO: 318 K/UL (ref 164–446)
PMV BLD AUTO: 8.9 FL (ref 9–12.9)
POTASSIUM SERPL-SCNC: 5.4 MMOL/L (ref 3.6–5.5)
RBC # BLD AUTO: 2.65 M/UL (ref 4.2–5.4)
SIGNIFICANT IND 70042: NORMAL
SITE SITE: NORMAL
SODIUM SERPL-SCNC: 136 MMOL/L (ref 135–145)
SOURCE SOURCE: NORMAL
WBC # BLD AUTO: 9.5 K/UL (ref 4.8–10.8)

## 2017-06-21 ENCOUNTER — APPOINTMENT (OUTPATIENT)
Dept: RADIOLOGY | Facility: MEDICAL CENTER | Age: 67
DRG: 870 | End: 2017-06-21
Attending: EMERGENCY MEDICINE
Payer: MEDICARE

## 2017-06-21 ENCOUNTER — HOSPITAL ENCOUNTER (INPATIENT)
Facility: MEDICAL CENTER | Age: 67
LOS: 11 days | DRG: 870 | End: 2017-07-03
Attending: EMERGENCY MEDICINE | Admitting: INTERNAL MEDICINE
Payer: MEDICARE

## 2017-06-21 ENCOUNTER — TELEPHONE (OUTPATIENT)
Dept: MEDICAL GROUP | Facility: PHYSICIAN GROUP | Age: 67
End: 2017-06-21

## 2017-06-21 DIAGNOSIS — Z78.0 MENOPAUSE: ICD-10-CM

## 2017-06-21 DIAGNOSIS — J96.01 ACUTE RESPIRATORY FAILURE WITH HYPOXIA (HCC): ICD-10-CM

## 2017-06-21 LAB
ALBUMIN SERPL BCP-MCNC: 3.4 G/DL (ref 3.2–4.9)
ALBUMIN/GLOB SERPL: 1 G/DL
ALP SERPL-CCNC: 59 U/L (ref 30–99)
ALT SERPL-CCNC: 15 U/L (ref 2–50)
ANION GAP SERPL CALC-SCNC: 8 MMOL/L (ref 0–11.9)
APTT PPP: 34.3 SEC (ref 24.7–36)
AST SERPL-CCNC: 16 U/L (ref 12–45)
BACTERIA SPEC RESP CULT: NORMAL
BASOPHILS # BLD AUTO: 0.6 % (ref 0–1.8)
BASOPHILS # BLD: 0.15 K/UL (ref 0–0.12)
BILIRUB SERPL-MCNC: 0.8 MG/DL (ref 0.1–1.5)
BUN SERPL-MCNC: 29 MG/DL (ref 8–22)
CALCIUM SERPL-MCNC: 9 MG/DL (ref 8.5–10.5)
CHLORIDE SERPL-SCNC: 102 MMOL/L (ref 96–112)
CO2 SERPL-SCNC: 22 MMOL/L (ref 20–33)
CREAT SERPL-MCNC: 1.41 MG/DL (ref 0.5–1.4)
EOSINOPHIL # BLD AUTO: 0.45 K/UL (ref 0–0.51)
EOSINOPHIL NFR BLD: 1.8 % (ref 0–6.9)
ERYTHROCYTE [DISTWIDTH] IN BLOOD BY AUTOMATED COUNT: 60.2 FL (ref 35.9–50)
GFR SERPL CREATININE-BSD FRML MDRD: 37 ML/MIN/1.73 M 2
GLOBULIN SER CALC-MCNC: 3.5 G/DL (ref 1.9–3.5)
GLUCOSE SERPL-MCNC: 179 MG/DL (ref 65–99)
HCT VFR BLD AUTO: 29 % (ref 37–47)
HGB BLD-MCNC: 8.6 G/DL (ref 12–16)
IMM GRANULOCYTES # BLD AUTO: 0.36 K/UL (ref 0–0.11)
IMM GRANULOCYTES NFR BLD AUTO: 1.5 % (ref 0–0.9)
INR PPP: 1.09 (ref 0.87–1.13)
LACTATE BLD-SCNC: 0.9 MMOL/L (ref 0.5–2)
LYMPHOCYTES # BLD AUTO: 1.18 K/UL (ref 1–4.8)
LYMPHOCYTES NFR BLD: 4.8 % (ref 22–41)
MCH RBC QN AUTO: 28.4 PG (ref 27–33)
MCHC RBC AUTO-ENTMCNC: 29.7 G/DL (ref 33.6–35)
MCV RBC AUTO: 95.7 FL (ref 81.4–97.8)
MONOCYTES # BLD AUTO: 1.1 K/UL (ref 0–0.85)
MONOCYTES NFR BLD AUTO: 4.5 % (ref 0–13.4)
NEUTROPHILS # BLD AUTO: 21.39 K/UL (ref 2–7.15)
NEUTROPHILS NFR BLD: 86.8 % (ref 44–72)
NRBC # BLD AUTO: 0.03 K/UL
NRBC BLD AUTO-RTO: 0.1 /100 WBC
PLATELET # BLD AUTO: 461 K/UL (ref 164–446)
PMV BLD AUTO: 8.6 FL (ref 9–12.9)
POTASSIUM SERPL-SCNC: 6.1 MMOL/L (ref 3.6–5.5)
PROT SERPL-MCNC: 6.9 G/DL (ref 6–8.2)
PROTHROMBIN TIME: 14.4 SEC (ref 12–14.6)
RBC # BLD AUTO: 3.03 M/UL (ref 4.2–5.4)
SIGNIFICANT IND 70042: NORMAL
SITE SITE: NORMAL
SODIUM SERPL-SCNC: 132 MMOL/L (ref 135–145)
SOURCE SOURCE: NORMAL
TROPONIN I SERPL-MCNC: 0.02 NG/ML (ref 0–0.04)
WBC # BLD AUTO: 24.6 K/UL (ref 4.8–10.8)

## 2017-06-21 PROCEDURE — 304538 HCHG NG TUBE

## 2017-06-21 PROCEDURE — 84484 ASSAY OF TROPONIN QUANT: CPT

## 2017-06-21 PROCEDURE — 85025 COMPLETE CBC W/AUTO DIFF WBC: CPT

## 2017-06-21 PROCEDURE — 5A1955Z RESPIRATORY VENTILATION, GREATER THAN 96 CONSECUTIVE HOURS: ICD-10-PCS | Performed by: INTERNAL MEDICINE

## 2017-06-21 PROCEDURE — 83036 HEMOGLOBIN GLYCOSYLATED A1C: CPT

## 2017-06-21 PROCEDURE — 31500 INSERT EMERGENCY AIRWAY: CPT

## 2017-06-21 PROCEDURE — 80053 COMPREHEN METABOLIC PANEL: CPT

## 2017-06-21 PROCEDURE — 71010 DX-CHEST-PORTABLE (1 VIEW): CPT

## 2017-06-21 PROCEDURE — 99291 CRITICAL CARE FIRST HOUR: CPT

## 2017-06-21 PROCEDURE — 302128 INFUSION PUMP: Performed by: EMERGENCY MEDICINE

## 2017-06-21 PROCEDURE — 85610 PROTHROMBIN TIME: CPT

## 2017-06-21 PROCEDURE — 83605 ASSAY OF LACTIC ACID: CPT

## 2017-06-21 PROCEDURE — 303105 HCHG CATHETER EXTRA

## 2017-06-21 PROCEDURE — 700111 HCHG RX REV CODE 636 W/ 250 OVERRIDE (IP): Performed by: EMERGENCY MEDICINE

## 2017-06-21 PROCEDURE — 94002 VENT MGMT INPAT INIT DAY: CPT

## 2017-06-21 PROCEDURE — 51702 INSERT TEMP BLADDER CATH: CPT

## 2017-06-21 PROCEDURE — 96375 TX/PRO/DX INJ NEW DRUG ADDON: CPT

## 2017-06-21 PROCEDURE — 96365 THER/PROPH/DIAG IV INF INIT: CPT

## 2017-06-21 PROCEDURE — 0BH18EZ INSERTION OF ENDOTRACHEAL AIRWAY INTO TRACHEA, VIA NATURAL OR ARTIFICIAL OPENING ENDOSCOPIC: ICD-10-PCS | Performed by: EMERGENCY MEDICINE

## 2017-06-21 PROCEDURE — 93005 ELECTROCARDIOGRAM TRACING: CPT | Performed by: EMERGENCY MEDICINE

## 2017-06-21 PROCEDURE — 85730 THROMBOPLASTIN TIME PARTIAL: CPT

## 2017-06-21 PROCEDURE — 87040 BLOOD CULTURE FOR BACTERIA: CPT

## 2017-06-21 RX ORDER — ETOMIDATE 2 MG/ML
20 INJECTION INTRAVENOUS ONCE
Status: COMPLETED | OUTPATIENT
Start: 2017-06-21 | End: 2017-06-21

## 2017-06-21 RX ORDER — METHYLPREDNISOLONE SODIUM SUCCINATE 125 MG/2ML
125 INJECTION, POWDER, LYOPHILIZED, FOR SOLUTION INTRAMUSCULAR; INTRAVENOUS ONCE
Status: COMPLETED | OUTPATIENT
Start: 2017-06-21 | End: 2017-06-21

## 2017-06-21 RX ORDER — AMPICILLIN AND SULBACTAM 2; 1 G/1; G/1
3 INJECTION, POWDER, FOR SOLUTION INTRAMUSCULAR; INTRAVENOUS ONCE
Status: COMPLETED | OUTPATIENT
Start: 2017-06-21 | End: 2017-06-22

## 2017-06-21 RX ORDER — DEXTROSE MONOHYDRATE 25 G/50ML
25 INJECTION, SOLUTION INTRAVENOUS ONCE
Status: DISCONTINUED | OUTPATIENT
Start: 2017-06-22 | End: 2017-06-21 | Stop reason: SINTOL

## 2017-06-21 RX ORDER — SUCCINYLCHOLINE CHLORIDE 20 MG/ML
200 INJECTION INTRAMUSCULAR; INTRAVENOUS ONCE
Status: COMPLETED | OUTPATIENT
Start: 2017-06-21 | End: 2017-06-21

## 2017-06-21 RX ADMIN — SUCCINYLCHOLINE CHLORIDE 200 MG: 20 INJECTION, SOLUTION INTRAMUSCULAR; INTRAVENOUS at 22:43

## 2017-06-21 RX ADMIN — PROPOFOL 5 MCG/KG/MIN: 10 INJECTION, EMULSION INTRAVENOUS at 23:03

## 2017-06-21 RX ADMIN — METHYLPREDNISOLONE SODIUM SUCCINATE 125 MG: 125 INJECTION, POWDER, FOR SOLUTION INTRAMUSCULAR; INTRAVENOUS at 23:41

## 2017-06-21 RX ADMIN — ETOMIDATE 20 MG: 2 INJECTION INTRAVENOUS at 22:43

## 2017-06-21 ASSESSMENT — LIFESTYLE VARIABLES: DO YOU DRINK ALCOHOL: NO

## 2017-06-21 NOTE — IP AVS SNAPSHOT
trippiece Access Code: [email or web address]  Expires: 7/9/2017  2:53 PM    Your email address is not on file at [facility].  Email Addresses are required for you to sign up for trippiece, please contact [phone] to verify your personal information and to provide your email address prior to attempting to register for trippiece.    Priya Burt London  [address] [address]    trippiece  A secure, online tool to manage your health information     [facility]’s trippiece® is a secure, online tool that connects you to your personalized health information from the privacy of your home -- day or night - making it very easy for you to manage your healthcare. Once the activation process is completed, you can even access your medical information using the trippiece mercy, which is available for free in the Apple Mercy store or Google Play store.     To learn more about trippiece, visit [email or web address]    There are two levels of access available (as shown below):   My Chart Features  [facility] Primary Care Doctor [facility]  Specialists [facility]  Urgent  Care Non-[facility] Primary Care Doctor   Email your healthcare team securely and privately 24/7 X X X    Manage appointments: schedule your next appointment; view details of past/upcoming appointments X      Request prescription refills. X      View recent personal medical records, including lab and immunizations X X X X   View health record, including health history, allergies, medications X X X X   Read reports about your outpatient visits, procedures, consult and ER notes X X X X   See your discharge summary, which is a recap of your hospital and/or ER visit that includes your diagnosis, lab results, and care plan X X  X     How to register for trippiece:  Once your e-mail address has been verified, follow the following steps to sign up for trippiece.     1. Go to  [email or web address]  2. Click on the Sign Up Now box, which takes you to the New Member Sign Up page.  You will need to provide the following information:  a. Enter your [employer] Access Code exactly as it appears at the top of this page. (You will not need to use this code after you’ve completed the sign-up process. If you do not sign up before the expiration date, you must request a new code.)   b. Enter your date of birth.   c. Enter your home email address.   d. Click Submit, and follow the next screen’s instructions.  3. Create a Snaptalentt ID. This will be your Mobstats login ID and cannot be changed, so think of one that is secure and easy to remember.  4. Create a Mobstats password. You can change your password at any time.  5. Enter your Password Reset Question and Answer. This can be used at a later time if you forget your password.   6. Enter your e-mail address. This allows you to receive e-mail notifications when new information is available in Mob[employer].  7. Click Sign Up. You can now view your health information.    For assistance activating your [employer] account, call [phone]

## 2017-06-21 NOTE — IP AVS SNAPSHOT
" <p align=\"LEFT\"><IMG SRC=\"//[email or web address]$/[email or web address]\" alt=\"Image\" WIDTH=\"50%\" HEIGHT=\"200\" BORDER=\"\"></p>                   Name:Priya Callahan  Medical Record Number:[id number]  CSN: [id number]    YOB: 1950   Age: 66 y.o.  Sex: female  HT:1.638 m (5' 4.5\") WT: 89.9 kg (198 lb 3.1 oz)          Admit Date: 6/21/2017     Discharge Date:   Today's Date: 7/3/2017  Attending Doctor:  Tim Ingram M.D.                  Allergies:  Vitamin c; Keflex; Codeine; Lyrica; and Sudafed          Your appointments     Jul 17, 2017  8:40 AM   Hematology New Patient with Brian Olea M.D.   [facility] (--)    [address] [place] [address], [address]  [facility] [address]   [phone]            Aug 09, 2017  8:30 AM   Telemedicine Clinic New Patient with Parvez Garcia M.D., [facility]MEDICINE [place]   [facility] [address] [phone]            Aug 29, 2017  1:20 PM   NEW TO YOU with Kavitha Mccall M.D.   [address] [address]   [phone]                 Medication List      Take these Medications        Instructions    amlodipine 10 MG Tabs   Commonly known as:  NORVASC    Take 1 Tab by mouth every day.   Dose:  10 mg       budesonide-formoterol 160-4.5 MCG/ACT Aero   Commonly known as:  SYMBICORT    Inhale 2 Puffs by mouth 2 Times a Day.   Dose:  2 Puff       carvedilol 3.125 MG Tabs   Commonly known as:  COREG    Take 3.125 mg by mouth 2 times a day, with meals.   Dose:  3.125 mg       COMBIVENT RESPIMAT  MCG/ACT Aers   Generic drug:  ipratropium-albuterol    Inhale 1 Puff by mouth every 6 hours as needed. Uses prn only   Dose:  1 Puff       [facility] PO    Take 1 Tab by mouth every day.   Dose:  1 Tab       docusate sodium 100 MG Caps   Commonly known as:  COLACE    Take 100 mg by mouth 2 times a day.   Dose:  100 " mg       duloxetine 60 MG Cpep delayed-release capsule   Commonly known as:  CYMBALTA    Take 1 Cap by mouth every day.   Dose:  60 mg       folic acid 800 MCG tablet   Commonly known as:  FOLVITE    Take 800 mcg by mouth every day.   Dose:  800 mcg       gabapentin 600 MG tablet   Commonly known as:  NEURONTIN    Take 1 Tab by mouth 3 times a day.   Dose:  600 mg       insulin glargine 100 UNIT/ML Soln   Commonly known as:  LANTUS    Inject 22 Units as instructed every evening.   Dose:  22 Units       insulin lispro 100 UNIT/ML Soln   Commonly known as:  HUMALOG    Inject 3-12 Units as instructed 4 Times a Day,Before Meals and at Bedtime.   Dose:  3-12 Units       levothyroxine 75 MCG Tabs   Commonly known as:  SYNTHROID    TAKE 1 TABLET BY MOUTH DAILY       lorazepam 0.5 MG Tabs   Commonly known as:  ATIVAN    Take 0.5 mg by mouth every evening.   Dose:  0.5 mg       montelukast 10 MG Tabs   Commonly known as:  SINGULAIR    Take 1 Tab by mouth every day.   Dose:  10 mg       multivitamin Tabs    Take 1 Tab by mouth every day.   Dose:  1 Tab       omeprazole 40 MG delayed-release capsule   Commonly known as:  PRILOSEC    Take 1 Cap by mouth every day.   Dose:  40 mg       oxycodone immediate release 10 MG immediate release tablet   Commonly known as:  ROXICODONE    Take 10 mg by mouth every 3 hours as needed for Moderate Pain.   Dose:  10 mg       predniSONE 20 MG Tabs   Commonly known as:  DELTASONE    Take 2 Tabs by mouth every day. Take 40 mg for 5 days, then Take 30 mg for 3 days then Take 20 mg for 3 days then Take 10 mg for 2 days then Take 5 mg for 2 days and then stop.   Dose:  40 mg       senna-docusate 8.6-50 MG Tabs   Commonly known as:  PERICOLACE or SENOKOT S    Take 1 Tab by mouth every evening.   Dose:  1 Tab       tiotropium 18 MCG Caps   Commonly known as:  SPIRIVA    Inhale 1 Cap by mouth every day.   Dose:  18 mcg       trazodone 150 MG Tabs   Commonly known as:  DESYREL    Take 300 mg by mouth  every evening.   Dose:  300 mg       * vitamin B-12 1000 MCG Tabs    Take 1,000 mcg by mouth every evening.   Dose:  1000 mcg       * cyanocobalamin 1000 MCG/ML Soln   Commonly known as:  VITAMIN B-12    1,000 mcg by Intramuscular route every Saturday.   Dose:  1000 mcg       Vitamin D3 2000 UNITS Tabs    Take 2,000 Units by mouth every day.   Dose:  2000 Units       * Notice:  This list has 2 medication(s) that are the same as other medications prescribed for you. Read the directions carefully, and ask your doctor or other care provider to review them with you.

## 2017-06-21 NOTE — IP AVS SNAPSHOT
" Home Care Instructions                                                                                                                  Name:Priya Callahan  Medical Record Number:[id number]  CSN: [id number]    YOB: 1950   Age: 66 y.o.  Sex: female  HT:1.638 m (5' 4.5\") WT: 89.9 kg (198 lb 3.1 oz)          Admit Date: 6/21/2017     Discharge Date:   Today's Date: 7/3/2017  Attending Doctor:  Tim Ingram M.D.                  Allergies:  Vitamin c; Keflex; Codeine; Lyrica; and Sudafed            Discharge Instructions       Discharge Instructions    Discharged to home by car with relative. Discharged via wheelchair, hospital escort: Yes.  Special equipment needed: Not Applicable    Be sure to schedule a follow-up appointment with your primary care doctor or any specialists as instructed.     [facility] is being reordered for you by [facility] physicians - home health number:     Discharge Plan:   Diet Plan: Discussed - dysphagia 2 diet NECTAR THICK LIQUIDS  Activity Level: Discussed  Confirmed Follow up Appointment: Patient to Call and Schedule Appointment - Some appointments scheduled, see below  Confirmed Symptoms Management: Discussed  Medication Reconciliation Updated: Yes  Influenza Vaccine Indication: Not indicated: Previously immunized this influenza season and > 8 years of age    I understand that a diet low in cholesterol, fat, and sodium is recommended for good health. Unless I have been given specific instructions below for another diet, I accept this instruction as my diet prescription.   Other diet: dysphagia 2 diet NECTAR THICK LIQUIDS      · Is patient discharged on Warfarin / Coumadin?   No     · Is patient Post Blood Transfusion?  No    Depression / Suicide Risk    As you are discharged from this [facility] facility, it is important to learn how to keep safe from harming yourself.    Recognize the warning signs:  · Abrupt changes in personality, positive or " negative- including increase in energy   · Giving away possessions  · Change in eating patterns- significant weight changes-  positive or negative  · Change in sleeping patterns- unable to sleep or sleeping all the time   · Unwillingness or inability to communicate  · Depression  · Unusual sadness, discouragement and loneliness  · Talk of wanting to die  · Neglect of personal appearance   · Rebelliousness- reckless behavior  · Withdrawal from people/activities they love  · Confusion- inability to concentrate     If you or a loved one observes any of these behaviors or has concerns about self-harm, here's what you can do:  · Talk about it- your feelings and reasons for harming yourself  · Remove any means that you might use to hurt yourself (examples: pills, rope, extension cords, firearm)  · Get professional help from the community (Mental Health, Substance Abuse, psychological counseling)  · Do not be alone:Call your Safe Contact- someone whom you trust who will be there for you.  · Call your local CRISIS HOTLINE 359-6101 or 501-426-1240  · Call your local Children's Mobile Crisis Response Team [place] (417) 316-7486 or [facility] [place] [facility]  · Call the toll free National Suicide Prevention Hotlines   · National Suicide Prevention Lifeline 420-544-TWTO (6864)  · National Hope Line Network 800-SUICIDE (632-8388)        Your appointments     Jul 17, 2017  8:40 AM   Hematology New Patient with Brian Olea M.D.   [facility] (--)    [address], [address]  [facility] [address]   [phone]            Aug 09, 2017  8:30 AM   Telemedicine Clinic New Patient with Parvez Garcia M.D., [facility] -[facility]   [facility] [place] ([place])    [address] [address] [phone]            Aug 29, 2017  1:20 PM   NEW TO YOU with Kavitha Mccall M.D.   [facility] [place]    [address] [address]     [phone]                 Discharge Medication Instructions:    Below are the medications your physician expects you to take upon discharge:    Review all your home medications and newly ordered medications with your doctor and/or pharmacist. Follow medication instructions as directed by your doctor and/or pharmacist.    Please keep your medication list with you and share with your physician.               Medication List      START taking these medications        Instructions    Morning Afternoon Evening Bedtime    predniSONE 20 MG Tabs   Last time this was given:  40 mg on 7/3/2017  8:48 AM   Commonly known as:  DELTASONE        Take 2 Tabs by mouth every day. Take 40 mg for 5 days, then Take 30 mg for 3 days then Take 20 mg for 3 days then Take 10 mg for 2 days then Take 5 mg for 2 days and then stop.   Dose:  40 mg                          CONTINUE taking these medications        Instructions    Morning Afternoon Evening Bedtime    amlodipine 10 MG Tabs   Last time this was given:  10 mg on 7/3/2017  8:49 AM   Commonly known as:  NORVASC        Take 1 Tab by mouth every day.   Dose:  10 mg                        budesonide-formoterol 160-4.5 MCG/ACT Aero   Commonly known as:  SYMBICORT        Inhale 2 Puffs by mouth 2 Times a Day.   Dose:  2 Puff                        carvedilol 3.125 MG Tabs   Last time this was given:  3.125 mg on 7/3/2017  8:48 AM   Commonly known as:  COREG        Take 3.125 mg by mouth 2 times a day, with meals.   Dose:  3.125 mg                        COMBIVENT RESPIMAT  MCG/ACT Aers   Generic drug:  ipratropium-albuterol        Inhale 1 Puff by mouth every 6 hours as needed. Uses prn only   Dose:  1 Puff                        [facility] PO        Take 1 Tab by mouth every day.   Dose:  1 Tab                        docusate sodium 100 MG Caps   Commonly known as:  COLACE        Take 100 mg by mouth 2 times a day.   Dose:  100 mg                         duloxetine 60 MG Cpep delayed-release capsule   Last time this was given:  60 mg on 7/3/2017  8:48 AM   Commonly known as:  CYMBALTA        Take 1 Cap by mouth every day.   Dose:  60 mg                        folic acid 800 MCG tablet   Commonly known as:  FOLVITE        Take 800 mcg by mouth every day.   Dose:  800 mcg                        gabapentin 600 MG tablet   Commonly known as:  NEURONTIN        Take 1 Tab by mouth 3 times a day.   Dose:  600 mg                        insulin glargine 100 UNIT/ML Soln   Last time this was given:  30 Units on 7/3/2017  6:22 AM   Commonly known as:  LANTUS        Inject 22 Units as instructed every evening.   Dose:  22 Units                        insulin lispro 100 UNIT/ML Soln   Last time this was given:  6 Units on 7/1/2017 12:44 PM   Commonly known as:  HUMALOG        Inject 3-12 Units as instructed 4 Times a Day,Before Meals and at Bedtime.   Dose:  3-12 Units                        levothyroxine 75 MCG Tabs   Last time this was given:  75 mcg on 7/3/2017  6:22 AM   Commonly known as:  SYNTHROID        TAKE 1 TABLET BY MOUTH DAILY                        lorazepam 0.5 MG Tabs   Commonly known as:  ATIVAN        Take 0.5 mg by mouth every evening.   Dose:  0.5 mg                        montelukast 10 MG Tabs   Last time this was given:  10 mg on 7/3/2017  8:49 AM   Commonly known as:  SINGULAIR        Take 1 Tab by mouth every day.   Dose:  10 mg                        multivitamin Tabs        Take 1 Tab by mouth every day.   Dose:  1 Tab                        omeprazole 40 MG delayed-release capsule   Commonly known as:  PRILOSEC        Take 1 Cap by mouth every day.   Dose:  40 mg                        oxycodone immediate release 10 MG immediate release tablet   Last time this was given:  10 mg on 7/3/2017  8:57 AM   Commonly known as:  ROXICODONE        Take 10 mg by mouth every 3 hours as needed for Moderate Pain.   Dose:  10 mg                        senna-docusate  8.6-50 MG Tabs   Last time this was given:  2 Tabs on 7/1/2017  8:19 PM   Commonly known as:  PERICOLACE or SENOKOT S        Take 1 Tab by mouth every evening.   Dose:  1 Tab                        tiotropium 18 MCG Caps   Commonly known as:  SPIRIVA        Inhale 1 Cap by mouth every day.   Dose:  18 mcg                        trazodone 150 MG Tabs   Last time this was given:  300 mg on 7/2/2017  8:41 PM   Commonly known as:  DESYREL        Take 300 mg by mouth every evening.   Dose:  300 mg                        * vitamin B-12 1000 MCG Tabs        Take 1,000 mcg by mouth every evening.   Dose:  1000 mcg                        * cyanocobalamin 1000 MCG/ML Soln   Commonly known as:  VITAMIN B-12        1,000 mcg by Intramuscular route every Saturday.   Dose:  1000 mcg                        Vitamin D3 2000 UNITS Tabs        Take 2,000 Units by mouth every day.   Dose:  2000 Units                        * Notice:  This list has 2 medication(s) that are the same as other medications prescribed for you. Read the directions carefully, and ask your doctor or other care provider to review them with you.      STOP taking these medications     hydrocortisone 5 MG Tabs   Commonly known as:  CORTEF               moxifloxacin 400 MG Tabs   Commonly known as:  AVELOX               potassium chloride SA 20 MEQ Tbcr   Commonly known as:  Kdur                    Where to Get Your Medications      Information about where to get these medications is not yet available     ! Ask your nurse or doctor about these medications    - predniSONE 20 MG Tabs            Instructions           Diet / Nutrition:    Follow any diet instructions given to you by your doctor or the dietician, including how much salt (sodium) you are allowed each day.    If you are overweight, talk to your doctor about a weight reduction plan.    Activity:    Remain physically active following your doctor's instructions about exercise and activity.    Rest often.          Any time you become even a little tired or short of breath, SIT DOWN and rest.    Worsening Symptoms:    Report any of the following signs and symptoms to the doctor's office immediately:    *Pain of jaw, arm, or neck  *Chest pain not relieved by medication                               *Dizziness or loss of consciousness  *Difficulty breathing even when at rest   *More tired than usual                                       *Bleeding drainage or swelling of surgical site  *Swelling of feet, ankles, legs or stomach                 *Fever (>100ºF)  *Pink or blood tinged sputum  *Weight gain (3lbs/day or 5lbs /week)           *Shock from internal defibrillator (if applicable)  *Palpitations or irregular heartbeats                *Cool and/or numb extremities    Stroke Awareness    Common Risk Factors for Stroke include:    Age  Atrial Fibrillation  Carotid Artery Stenosis  Diabetes Mellitus  Excessive alcohol consumption  High blood pressure  Overweight   Physical inactivity  Smoking    Warning signs and symptoms of a stroke include:    *Sudden numbness or weakness of the face, arm or leg (especially on one side of the body).  *Sudden confusion, trouble speaking or understanding.  *Sudden trouble seeing in one or both eyes.  *Sudden trouble walking, dizziness, loss of balance or coordination.Sudden severe headache with no known cause.    It is very important to get treatment quickly when a stroke occurs. If you experience any of the above warning signs, call 911 immediately.                   Disclaimer         Quit Smoking / Tobacco Use:    I understand the use of any tobacco products increases my chance of suffering from future heart disease or stroke and could cause other illnesses which may shorten my life. Quitting the use of tobacco products is the single most important thing I can do to improve my health. For further information on smoking / tobacco cessation call a Toll Free Quit Line at 1-219.592.9623  (*[employer]) or 1-529.445.6124 (American Lung Association) or you can access the web based program at www.lungusa.org.    [place] Tobacco Users Help Line:  (147) 947-7146       Toll Free: 7-576-721-1598  Quit Tobacco Program [facility] (220)706-0327    Crisis Hotline:    [place] Crisis Hotline:  4-567-MEUUOLH or 1-738.435.6553    [place] Crisis Hotline:    1-578.349.7671 or 140-247-7028    Discharge Survey:   Thank you for choosing [facility]. We hope we did everything we could to make your hospital stay a pleasant one. You may be receiving a phone survey and we would appreciate your time and participation in answering the questions. Your input is very valuable to us in our efforts to improve our service to our patients and their families.        My signature on this form indicates that:    1. I have reviewed and understand the above information.  2. My questions regarding this information have been answered to my satisfaction.  3. I have formulated a plan with my discharge nurse to obtain my prescribed medications for home.                  Disclaimer         __________________________________                     __________       ________                       Patient Signature                                                 Date                    Time

## 2017-06-21 NOTE — IP AVS SNAPSHOT
7/3/2017    Priya Callahan  [address] [place] [address]    Dear Priya:    [facility] wants to ensure your discharge home is safe and you or your loved ones have had all of your questions answered regarding your care after you leave the hospital.    Below is a list of resources and contact information should you have any questions regarding your hospital stay, follow-up instructions, or active medical symptoms.    Questions or Concerns Regarding… Contact   Medical Questions Related to Your Discharge  (7 days a week, 8am-5pm) Contact a Nurse Care Coordinator   [phone]   Medical Questions Not Related to Your Discharge  (24 hours a day / 7 days a week)  Contact the Nurse Health Line   [phone]    Medications or Discharge Instructions Refer to your discharge packet   or contact your [facility] Primary Care Provider   [phone]   Follow-up Appointment(s) Schedule your appointment via [employer]   or contact Scheduling [phone]   Billing Review your statement via [employer]  or contact Billing [phone]   Medical Records Review your records via [employer]   or contact Medical Records [phone]     You may receive a telephone call within two days of discharge. This call is to make certain you understand your discharge instructions and have the opportunity to have any questions answered. You can also easily access your medical information, test results and upcoming appointments via the [employer] free online health management tool. You can learn more and sign up at [email or web address]. For assistance setting up your [employer] account, please call [phone].    Once again, we want to ensure your discharge home is safe and that you have a clear understanding of any next steps in your care. If you have any questions or concerns, please do not hesitate to contact us, we are here for you. Thank you for choosing [facility] for your healthcare needs.    Sincerely,    Your [facility] Healthcare Team

## 2017-06-21 NOTE — TELEPHONE ENCOUNTER
Please help patient make an appointment with me to establish care as her PCP.   She will also need a dexa scan which has been ordered today. We can review her hospital visits at visit with me.   Kavitha Mccall M.D.

## 2017-06-22 ENCOUNTER — APPOINTMENT (OUTPATIENT)
Dept: RADIOLOGY | Facility: MEDICAL CENTER | Age: 67
DRG: 870 | End: 2017-06-22
Attending: INTERNAL MEDICINE
Payer: MEDICARE

## 2017-06-22 ENCOUNTER — RESOLUTE PROFESSIONAL BILLING HOSPITAL PROF FEE (OUTPATIENT)
Dept: OTHER | Facility: MEDICAL CENTER | Age: 67
End: 2017-06-22
Payer: MEDICARE

## 2017-06-22 PROBLEM — J96.00 ACUTE RESPIRATORY FAILURE REQUIRING REINTUBATION (HCC): Status: ACTIVE | Noted: 2017-06-22

## 2017-06-22 LAB
AMORPH CRY #/AREA URNS HPF: PRESENT /HPF
ANION GAP SERPL CALC-SCNC: 8 MMOL/L (ref 0–11.9)
APPEARANCE UR: ABNORMAL
BACTERIA #/AREA URNS HPF: ABNORMAL /HPF
BASE EXCESS BLDA CALC-SCNC: -4 MMOL/L (ref -4–3)
BASE EXCESS BLDA CALC-SCNC: -4 MMOL/L (ref -4–3)
BASE EXCESS BLDA CALC-SCNC: -6 MMOL/L (ref -4–3)
BASOPHILS # BLD AUTO: 0.1 % (ref 0–1.8)
BASOPHILS # BLD: 0.02 K/UL (ref 0–0.12)
BILIRUB UR QL STRIP.AUTO: NEGATIVE
BNP SERPL-MCNC: 1186 PG/ML (ref 0–100)
BODY TEMPERATURE: ABNORMAL DEGREES
BUN SERPL-MCNC: 34 MG/DL (ref 8–22)
CALCIUM SERPL-MCNC: 8.6 MG/DL (ref 8.5–10.5)
CHLORIDE SERPL-SCNC: 106 MMOL/L (ref 96–112)
CO2 BLDA-SCNC: 23 MMOL/L (ref 20–33)
CO2 BLDA-SCNC: 24 MMOL/L (ref 20–33)
CO2 BLDA-SCNC: 25 MMOL/L (ref 20–33)
CO2 SERPL-SCNC: 22 MMOL/L (ref 20–33)
COLOR UR: YELLOW
CREAT SERPL-MCNC: 1.56 MG/DL (ref 0.5–1.4)
EOSINOPHIL # BLD AUTO: 0 K/UL (ref 0–0.51)
EOSINOPHIL NFR BLD: 0 % (ref 0–6.9)
EPI CELLS #/AREA URNS HPF: ABNORMAL /HPF
ERYTHROCYTE [DISTWIDTH] IN BLOOD BY AUTOMATED COUNT: 56.4 FL (ref 35.9–50)
EST. AVERAGE GLUCOSE BLD GHB EST-MCNC: 103 MG/DL
GFR SERPL CREATININE-BSD FRML MDRD: 33 ML/MIN/1.73 M 2
GLUCOSE BLD-MCNC: 264 MG/DL (ref 65–99)
GLUCOSE BLD-MCNC: 346 MG/DL (ref 65–99)
GLUCOSE BLD-MCNC: 362 MG/DL (ref 65–99)
GLUCOSE SERPL-MCNC: 351 MG/DL (ref 65–99)
GLUCOSE UR STRIP.AUTO-MCNC: NEGATIVE MG/DL
GRAM STN SPEC: NORMAL
HBA1C MFR BLD: 5.2 % (ref 0–5.6)
HCO3 BLDA-SCNC: 21.2 MMOL/L (ref 17–25)
HCO3 BLDA-SCNC: 22.6 MMOL/L (ref 17–25)
HCO3 BLDA-SCNC: 23.6 MMOL/L (ref 17–25)
HCT VFR BLD AUTO: 24.6 % (ref 37–47)
HGB BLD-MCNC: 7.6 G/DL (ref 12–16)
HYALINE CASTS #/AREA URNS LPF: ABNORMAL /LPF
IMM GRANULOCYTES # BLD AUTO: 0.13 K/UL (ref 0–0.11)
IMM GRANULOCYTES NFR BLD AUTO: 0.8 % (ref 0–0.9)
KETONES UR STRIP.AUTO-MCNC: NEGATIVE MG/DL
LEUKOCYTE ESTERASE UR QL STRIP.AUTO: NEGATIVE
LYMPHOCYTES # BLD AUTO: 0.2 K/UL (ref 1–4.8)
LYMPHOCYTES NFR BLD: 1.3 % (ref 22–41)
MCH RBC QN AUTO: 28.8 PG (ref 27–33)
MCHC RBC AUTO-ENTMCNC: 30.9 G/DL (ref 33.6–35)
MCV RBC AUTO: 93.2 FL (ref 81.4–97.8)
MICRO URNS: ABNORMAL
MONOCYTES # BLD AUTO: 0.11 K/UL (ref 0–0.85)
MONOCYTES NFR BLD AUTO: 0.7 % (ref 0–13.4)
MUCOUS THREADS #/AREA URNS HPF: ABNORMAL /HPF
NEUTROPHILS # BLD AUTO: 15.14 K/UL (ref 2–7.15)
NEUTROPHILS NFR BLD: 97.1 % (ref 44–72)
NITRITE UR QL STRIP.AUTO: NEGATIVE
NRBC # BLD AUTO: 0 K/UL
NRBC BLD AUTO-RTO: 0 /100 WBC
O2/TOTAL GAS SETTING VFR VENT: 100 %
O2/TOTAL GAS SETTING VFR VENT: 60 %
O2/TOTAL GAS SETTING VFR VENT: 60 %
PCO2 BLDA: 48 MMHG (ref 26–37)
PCO2 BLDA: 49.6 MMHG (ref 26–37)
PCO2 BLDA: 58 MMHG (ref 26–37)
PCO2 TEMP ADJ BLDA: 48.5 MMHG (ref 26–37)
PCO2 TEMP ADJ BLDA: 51.4 MMHG (ref 26–37)
PH BLDA: 7.22 [PH] (ref 7.4–7.5)
PH BLDA: 7.25 [PH] (ref 7.4–7.5)
PH BLDA: 7.27 [PH] (ref 7.4–7.5)
PH TEMP ADJ BLDA: 7.25 [PH] (ref 7.4–7.5)
PH TEMP ADJ BLDA: 7.26 [PH] (ref 7.4–7.5)
PH UR STRIP.AUTO: 5.5 [PH]
PLATELET # BLD AUTO: 281 K/UL (ref 164–446)
PMV BLD AUTO: 9.1 FL (ref 9–12.9)
PO2 BLDA: 146 MMHG (ref 64–87)
PO2 BLDA: 65 MMHG (ref 64–87)
PO2 BLDA: 76 MMHG (ref 64–87)
PO2 TEMP ADJ BLDA: 69 MMHG (ref 64–87)
PO2 TEMP ADJ BLDA: 77 MMHG (ref 64–87)
POTASSIUM SERPL-SCNC: 4.7 MMOL/L (ref 3.6–5.5)
PROCALCITONIN SERPL-MCNC: 2.89 NG/ML
PROT UR QL STRIP: 300 MG/DL
RBC # BLD AUTO: 2.64 M/UL (ref 4.2–5.4)
RBC # URNS HPF: ABNORMAL /HPF
RBC UR QL AUTO: NEGATIVE
RHODAMINE-AURAMINE STN SPEC: NORMAL
SAO2 % BLDA: 89 % (ref 93–99)
SAO2 % BLDA: 92 % (ref 93–99)
SAO2 % BLDA: 99 % (ref 93–99)
SIGNIFICANT IND 70042: NORMAL
SIGNIFICANT IND 70042: NORMAL
SITE SITE: NORMAL
SITE SITE: NORMAL
SODIUM SERPL-SCNC: 136 MMOL/L (ref 135–145)
SOURCE SOURCE: NORMAL
SOURCE SOURCE: NORMAL
SP GR UR STRIP.AUTO: 1.01
SPECIMEN DRAWN FROM PATIENT: ABNORMAL
VANCOMYCIN SERPL-MCNC: 32.4 UG/ML
WBC # BLD AUTO: 15.6 K/UL (ref 4.8–10.8)
WBC #/AREA URNS HPF: ABNORMAL /HPF

## 2017-06-22 PROCEDURE — 84145 PROCALCITONIN (PCT): CPT

## 2017-06-22 PROCEDURE — 36556 INSERT NON-TUNNEL CV CATH: CPT | Performed by: INTERNAL MEDICINE

## 2017-06-22 PROCEDURE — 82962 GLUCOSE BLOOD TEST: CPT

## 2017-06-22 PROCEDURE — 96366 THER/PROPH/DIAG IV INF ADDON: CPT

## 2017-06-22 PROCEDURE — 302978 HCHG BRONCHOSCOPY-DIAGNOSTIC

## 2017-06-22 PROCEDURE — A9270 NON-COVERED ITEM OR SERVICE: HCPCS | Performed by: INTERNAL MEDICINE

## 2017-06-22 PROCEDURE — 99291 CRITICAL CARE FIRST HOUR: CPT | Mod: 25 | Performed by: INTERNAL MEDICINE

## 2017-06-22 PROCEDURE — B548ZZA ULTRASONOGRAPHY OF SUPERIOR VENA CAVA, GUIDANCE: ICD-10-PCS | Performed by: INTERNAL MEDICINE

## 2017-06-22 PROCEDURE — 700102 HCHG RX REV CODE 250 W/ 637 OVERRIDE(OP): Performed by: INTERNAL MEDICINE

## 2017-06-22 PROCEDURE — 88112 CYTOPATH CELL ENHANCE TECH: CPT

## 2017-06-22 PROCEDURE — 700111 HCHG RX REV CODE 636 W/ 250 OVERRIDE (IP): Performed by: INTERNAL MEDICINE

## 2017-06-22 PROCEDURE — 302136 NUTRITION PUMP: Performed by: INTERNAL MEDICINE

## 2017-06-22 PROCEDURE — 3E043XZ INTRODUCTION OF VASOPRESSOR INTO CENTRAL VEIN, PERCUTANEOUS APPROACH: ICD-10-PCS | Performed by: INTERNAL MEDICINE

## 2017-06-22 PROCEDURE — 700101 HCHG RX REV CODE 250: Performed by: INTERNAL MEDICINE

## 2017-06-22 PROCEDURE — 700105 HCHG RX REV CODE 258: Performed by: INTERNAL MEDICINE

## 2017-06-22 PROCEDURE — 0B978ZX DRAINAGE OF LEFT MAIN BRONCHUS, VIA NATURAL OR ARTIFICIAL OPENING ENDOSCOPIC, DIAGNOSTIC: ICD-10-PCS | Performed by: INTERNAL MEDICINE

## 2017-06-22 PROCEDURE — 0B988ZX DRAINAGE OF LEFT UPPER LOBE BRONCHUS, VIA NATURAL OR ARTIFICIAL OPENING ENDOSCOPIC, DIAGNOSTIC: ICD-10-PCS | Performed by: INTERNAL MEDICINE

## 2017-06-22 PROCEDURE — 36556 INSERT NON-TUNNEL CV CATH: CPT

## 2017-06-22 PROCEDURE — 36600 WITHDRAWAL OF ARTERIAL BLOOD: CPT

## 2017-06-22 PROCEDURE — 96367 TX/PROPH/DG ADDL SEQ IV INF: CPT

## 2017-06-22 PROCEDURE — 87205 SMEAR GRAM STAIN: CPT

## 2017-06-22 PROCEDURE — 71010 DX-CHEST-PORTABLE (1 VIEW): CPT

## 2017-06-22 PROCEDURE — 81001 URINALYSIS AUTO W/SCOPE: CPT

## 2017-06-22 PROCEDURE — 83880 ASSAY OF NATRIURETIC PEPTIDE: CPT

## 2017-06-22 PROCEDURE — 700101 HCHG RX REV CODE 250

## 2017-06-22 PROCEDURE — 87086 URINE CULTURE/COLONY COUNT: CPT

## 2017-06-22 PROCEDURE — 96368 THER/DIAG CONCURRENT INF: CPT

## 2017-06-22 PROCEDURE — 87015 SPECIMEN INFECT AGNT CONCNTJ: CPT

## 2017-06-22 PROCEDURE — C1751 CATH, INF, PER/CENT/MIDLINE: HCPCS

## 2017-06-22 PROCEDURE — 0BJ08ZZ INSPECTION OF TRACHEOBRONCHIAL TREE, VIA NATURAL OR ARTIFICIAL OPENING ENDOSCOPIC: ICD-10-PCS | Performed by: INTERNAL MEDICINE

## 2017-06-22 PROCEDURE — 87206 SMEAR FLUORESCENT/ACID STAI: CPT

## 2017-06-22 PROCEDURE — 0B938ZX DRAINAGE OF RIGHT MAIN BRONCHUS, VIA NATURAL OR ARTIFICIAL OPENING ENDOSCOPIC, DIAGNOSTIC: ICD-10-PCS | Performed by: INTERNAL MEDICINE

## 2017-06-22 PROCEDURE — 87116 MYCOBACTERIA CULTURE: CPT

## 2017-06-22 PROCEDURE — 80048 BASIC METABOLIC PNL TOTAL CA: CPT

## 2017-06-22 PROCEDURE — 0B9B8ZX DRAINAGE OF LEFT LOWER LOBE BRONCHUS, VIA NATURAL OR ARTIFICIAL OPENING ENDOSCOPIC, DIAGNOSTIC: ICD-10-PCS | Performed by: INTERNAL MEDICINE

## 2017-06-22 PROCEDURE — 87102 FUNGUS ISOLATION CULTURE: CPT

## 2017-06-22 PROCEDURE — 31645 BRNCHSC W/THER ASPIR 1ST: CPT | Performed by: INTERNAL MEDICINE

## 2017-06-22 PROCEDURE — 770022 HCHG ROOM/CARE - ICU (200)

## 2017-06-22 PROCEDURE — 700102 HCHG RX REV CODE 250 W/ 637 OVERRIDE(OP): Performed by: EMERGENCY MEDICINE

## 2017-06-22 PROCEDURE — 96375 TX/PRO/DX INJ NEW DRUG ADDON: CPT

## 2017-06-22 PROCEDURE — 99292 CRITICAL CARE ADDL 30 MIN: CPT | Mod: 25 | Performed by: INTERNAL MEDICINE

## 2017-06-22 PROCEDURE — 700111 HCHG RX REV CODE 636 W/ 250 OVERRIDE (IP): Performed by: EMERGENCY MEDICINE

## 2017-06-22 PROCEDURE — 0B948ZX DRAINAGE OF RIGHT UPPER LOBE BRONCHUS, VIA NATURAL OR ARTIFICIAL OPENING ENDOSCOPIC, DIAGNOSTIC: ICD-10-PCS | Performed by: INTERNAL MEDICINE

## 2017-06-22 PROCEDURE — 87070 CULTURE OTHR SPECIMN AEROBIC: CPT

## 2017-06-22 PROCEDURE — 88305 TISSUE EXAM BY PATHOLOGIST: CPT

## 2017-06-22 PROCEDURE — 700105 HCHG RX REV CODE 258: Performed by: EMERGENCY MEDICINE

## 2017-06-22 PROCEDURE — 87106 FUNGI IDENTIFICATION YEAST: CPT

## 2017-06-22 PROCEDURE — 85025 COMPLETE CBC W/AUTO DIFF WBC: CPT

## 2017-06-22 PROCEDURE — 82803 BLOOD GASES ANY COMBINATION: CPT

## 2017-06-22 PROCEDURE — 80202 ASSAY OF VANCOMYCIN: CPT

## 2017-06-22 PROCEDURE — 02HV33Z INSERTION OF INFUSION DEVICE INTO SUPERIOR VENA CAVA, PERCUTANEOUS APPROACH: ICD-10-PCS | Performed by: INTERNAL MEDICINE

## 2017-06-22 PROCEDURE — 0B968ZX DRAINAGE OF RIGHT LOWER LOBE BRONCHUS, VIA NATURAL OR ARTIFICIAL OPENING ENDOSCOPIC, DIAGNOSTIC: ICD-10-PCS | Performed by: INTERNAL MEDICINE

## 2017-06-22 PROCEDURE — 0B958ZX DRAINAGE OF RIGHT MIDDLE LOBE BRONCHUS, VIA NATURAL OR ARTIFICIAL OPENING ENDOSCOPIC, DIAGNOSTIC: ICD-10-PCS | Performed by: INTERNAL MEDICINE

## 2017-06-22 RX ORDER — INSULIN GLARGINE 100 [IU]/ML
15 INJECTION, SOLUTION SUBCUTANEOUS
Status: DISCONTINUED | OUTPATIENT
Start: 2017-06-22 | End: 2017-06-23

## 2017-06-22 RX ORDER — SODIUM CHLORIDE 9 MG/ML
500 INJECTION, SOLUTION INTRAVENOUS
Status: DISCONTINUED | OUTPATIENT
Start: 2017-06-22 | End: 2017-06-23

## 2017-06-22 RX ORDER — MONTELUKAST SODIUM 10 MG/1
10 TABLET ORAL DAILY
Status: DISCONTINUED | OUTPATIENT
Start: 2017-06-22 | End: 2017-07-03 | Stop reason: HOSPADM

## 2017-06-22 RX ORDER — LIDOCAINE HYDROCHLORIDE 10 MG/ML
1-2 INJECTION, SOLUTION INFILTRATION; PERINEURAL
Status: DISCONTINUED | OUTPATIENT
Start: 2017-06-22 | End: 2017-06-28

## 2017-06-22 RX ORDER — IPRATROPIUM BROMIDE AND ALBUTEROL SULFATE 2.5; .5 MG/3ML; MG/3ML
3 SOLUTION RESPIRATORY (INHALATION)
Status: DISCONTINUED | OUTPATIENT
Start: 2017-06-23 | End: 2017-07-01

## 2017-06-22 RX ORDER — CHLORHEXIDINE GLUCONATE ORAL RINSE 1.2 MG/ML
15 SOLUTION DENTAL 2 TIMES DAILY
Status: DISCONTINUED | OUTPATIENT
Start: 2017-06-22 | End: 2017-06-28

## 2017-06-22 RX ORDER — POLYETHYLENE GLYCOL 3350 17 G/17G
1 POWDER, FOR SOLUTION ORAL
Status: DISCONTINUED | OUTPATIENT
Start: 2017-06-22 | End: 2017-07-03 | Stop reason: HOSPADM

## 2017-06-22 RX ORDER — MOXIFLOXACIN HYDROCHLORIDE 400 MG/1
400 TABLET ORAL DAILY
Status: ON HOLD | COMMUNITY
Start: 2017-06-13 | End: 2017-07-03

## 2017-06-22 RX ORDER — CYANOCOBALAMIN 1000 UG/ML
1000 INJECTION, SOLUTION INTRAMUSCULAR; SUBCUTANEOUS
COMMUNITY
End: 2017-09-28

## 2017-06-22 RX ORDER — METHYLPREDNISOLONE SODIUM SUCCINATE 125 MG/2ML
62.5 INJECTION, POWDER, LYOPHILIZED, FOR SOLUTION INTRAMUSCULAR; INTRAVENOUS EVERY 6 HOURS
Status: DISCONTINUED | OUTPATIENT
Start: 2017-06-22 | End: 2017-06-24

## 2017-06-22 RX ORDER — LEVOTHYROXINE SODIUM 0.07 MG/1
75 TABLET ORAL
Status: DISCONTINUED | OUTPATIENT
Start: 2017-06-22 | End: 2017-07-03 | Stop reason: HOSPADM

## 2017-06-22 RX ORDER — NOREPINEPHRINE BITARTRATE 1 MG/ML
INJECTION, SOLUTION INTRAVENOUS
Status: COMPLETED
Start: 2017-06-22 | End: 2017-06-22

## 2017-06-22 RX ORDER — FAMOTIDINE 20 MG/1
20 TABLET, FILM COATED ORAL DAILY
Status: DISCONTINUED | OUTPATIENT
Start: 2017-06-22 | End: 2017-06-24

## 2017-06-22 RX ORDER — SODIUM CHLORIDE 9 MG/ML
INJECTION, SOLUTION INTRAVENOUS CONTINUOUS
Status: DISCONTINUED | OUTPATIENT
Start: 2017-06-22 | End: 2017-06-23

## 2017-06-22 RX ORDER — AMOXICILLIN 250 MG
2 CAPSULE ORAL 2 TIMES DAILY
Status: DISCONTINUED | OUTPATIENT
Start: 2017-06-22 | End: 2017-07-03 | Stop reason: HOSPADM

## 2017-06-22 RX ORDER — IPRATROPIUM BROMIDE AND ALBUTEROL SULFATE 2.5; .5 MG/3ML; MG/3ML
3 SOLUTION RESPIRATORY (INHALATION)
Status: DISCONTINUED | OUTPATIENT
Start: 2017-06-22 | End: 2017-06-22

## 2017-06-22 RX ORDER — IPRATROPIUM BROMIDE AND ALBUTEROL SULFATE 2.5; .5 MG/3ML; MG/3ML
3 SOLUTION RESPIRATORY (INHALATION)
Status: DISCONTINUED | OUTPATIENT
Start: 2017-06-22 | End: 2017-07-03 | Stop reason: HOSPADM

## 2017-06-22 RX ORDER — LORAZEPAM 0.5 MG/1
0.5 TABLET ORAL EVERY EVENING
COMMUNITY
End: 2017-09-28

## 2017-06-22 RX ORDER — BISACODYL 10 MG
10 SUPPOSITORY, RECTAL RECTAL
Status: DISCONTINUED | OUTPATIENT
Start: 2017-06-22 | End: 2017-07-03 | Stop reason: HOSPADM

## 2017-06-22 RX ORDER — SODIUM CHLORIDE 9 MG/ML
30 INJECTION, SOLUTION INTRAVENOUS
Status: DISCONTINUED | OUTPATIENT
Start: 2017-06-22 | End: 2017-06-23

## 2017-06-22 RX ORDER — ACETAMINOPHEN 325 MG/1
650 TABLET ORAL EVERY 6 HOURS PRN
Status: DISCONTINUED | OUTPATIENT
Start: 2017-06-22 | End: 2017-07-03 | Stop reason: HOSPADM

## 2017-06-22 RX ORDER — SODIUM POLYSTYRENE SULFONATE 15 G/60ML
30 SUSPENSION ORAL; RECTAL ONCE
Status: COMPLETED | OUTPATIENT
Start: 2017-06-22 | End: 2017-06-22

## 2017-06-22 RX ORDER — DEXTROSE MONOHYDRATE 25 G/50ML
25 INJECTION, SOLUTION INTRAVENOUS
Status: DISCONTINUED | OUTPATIENT
Start: 2017-06-22 | End: 2017-07-03 | Stop reason: HOSPADM

## 2017-06-22 RX ORDER — HEPARIN SODIUM 5000 [USP'U]/ML
5000 INJECTION, SOLUTION INTRAVENOUS; SUBCUTANEOUS EVERY 8 HOURS
Status: DISCONTINUED | OUTPATIENT
Start: 2017-06-22 | End: 2017-07-02

## 2017-06-22 RX ADMIN — CHLORHEXIDINE GLUCONATE 15 ML: 1.2 RINSE ORAL at 20:51

## 2017-06-22 RX ADMIN — SODIUM CHLORIDE: 9 INJECTION, SOLUTION INTRAVENOUS at 08:10

## 2017-06-22 RX ADMIN — NOREPINEPHRINE BITARTRATE 8 MCG/MIN: 1 INJECTION INTRAVENOUS at 10:33

## 2017-06-22 RX ADMIN — STANDARDIZED SENNA CONCENTRATE AND DOCUSATE SODIUM 2 TABLET: 8.6; 5 TABLET, FILM COATED ORAL at 08:08

## 2017-06-22 RX ADMIN — IPRATROPIUM BROMIDE AND ALBUTEROL SULFATE 3 ML: .5; 3 SOLUTION RESPIRATORY (INHALATION) at 07:02

## 2017-06-22 RX ADMIN — STANDARDIZED SENNA CONCENTRATE AND DOCUSATE SODIUM 2 TABLET: 8.6; 5 TABLET, FILM COATED ORAL at 20:51

## 2017-06-22 RX ADMIN — FENTANYL CITRATE 100 MCG: 50 INJECTION, SOLUTION INTRAMUSCULAR; INTRAVENOUS at 19:05

## 2017-06-22 RX ADMIN — NOREPINEPHRINE BITARTRATE 5 MG: 1 INJECTION INTRAVENOUS at 03:30

## 2017-06-22 RX ADMIN — PIPERACILLIN SODIUM AND TAZOBACTAM SODIUM 4.5 G: 4; .5 INJECTION, POWDER, FOR SOLUTION INTRAVENOUS at 23:59

## 2017-06-22 RX ADMIN — AMPICILLIN SODIUM AND SULBACTAM SODIUM 3 G: 2; 1 INJECTION, POWDER, FOR SOLUTION INTRAMUSCULAR; INTRAVENOUS at 00:00

## 2017-06-22 RX ADMIN — PIPERACILLIN SODIUM AND TAZOBACTAM SODIUM 4.5 G: 4; .5 INJECTION, POWDER, FOR SOLUTION INTRAVENOUS at 03:34

## 2017-06-22 RX ADMIN — SODIUM POLYSTYRENE SULFONATE 30 G: 15 SUSPENSION ORAL; RECTAL at 05:17

## 2017-06-22 RX ADMIN — LEVOTHYROXINE SODIUM 75 MCG: 75 TABLET ORAL at 05:39

## 2017-06-22 RX ADMIN — HEPARIN SODIUM 5000 UNITS: 5000 INJECTION, SOLUTION INTRAVENOUS; SUBCUTANEOUS at 20:51

## 2017-06-22 RX ADMIN — IPRATROPIUM BROMIDE AND ALBUTEROL SULFATE 3 ML: .5; 3 SOLUTION RESPIRATORY (INHALATION) at 19:01

## 2017-06-22 RX ADMIN — METHYLPREDNISOLONE SODIUM SUCCINATE 62.5 MG: 125 INJECTION, POWDER, FOR SOLUTION INTRAMUSCULAR; INTRAVENOUS at 11:44

## 2017-06-22 RX ADMIN — HEPARIN SODIUM 5000 UNITS: 5000 INJECTION, SOLUTION INTRAVENOUS; SUBCUTANEOUS at 13:55

## 2017-06-22 RX ADMIN — INSULIN LISPRO 5 UNITS: 100 INJECTION, SOLUTION INTRAVENOUS; SUBCUTANEOUS at 23:59

## 2017-06-22 RX ADMIN — INSULIN LISPRO 6 UNITS: 100 INJECTION, SOLUTION INTRAVENOUS; SUBCUTANEOUS at 11:55

## 2017-06-22 RX ADMIN — FENTANYL CITRATE 50 MCG: 50 INJECTION, SOLUTION INTRAMUSCULAR; INTRAVENOUS at 16:32

## 2017-06-22 RX ADMIN — METHYLPREDNISOLONE SODIUM SUCCINATE 62.5 MG: 125 INJECTION, POWDER, FOR SOLUTION INTRAMUSCULAR; INTRAVENOUS at 16:53

## 2017-06-22 RX ADMIN — FENTANYL CITRATE 50 MCG: 50 INJECTION, SOLUTION INTRAMUSCULAR; INTRAVENOUS at 01:39

## 2017-06-22 RX ADMIN — PIPERACILLIN SODIUM AND TAZOBACTAM SODIUM 4.5 G: 4; .5 INJECTION, POWDER, FOR SOLUTION INTRAVENOUS at 11:50

## 2017-06-22 RX ADMIN — INSULIN LISPRO 5 UNITS: 100 INJECTION, SOLUTION INTRAVENOUS; SUBCUTANEOUS at 05:50

## 2017-06-22 RX ADMIN — PROPOFOL 20 MCG/KG/MIN: 10 INJECTION, EMULSION INTRAVENOUS at 13:55

## 2017-06-22 RX ADMIN — INSULIN LISPRO 8 UNITS: 100 INJECTION, SOLUTION INTRAVENOUS; SUBCUTANEOUS at 17:01

## 2017-06-22 RX ADMIN — NOREPINEPHRINE BITARTRATE 10 MCG/MIN: 1 INJECTION INTRAVENOUS at 01:15

## 2017-06-22 RX ADMIN — IPRATROPIUM BROMIDE AND ALBUTEROL SULFATE 3 ML: .5; 3 SOLUTION RESPIRATORY (INHALATION) at 14:46

## 2017-06-22 RX ADMIN — PROPOFOL 20 MCG/KG/MIN: 10 INJECTION, EMULSION INTRAVENOUS at 05:59

## 2017-06-22 RX ADMIN — FENTANYL CITRATE 50 MCG: 50 INJECTION, SOLUTION INTRAMUSCULAR; INTRAVENOUS at 22:00

## 2017-06-22 RX ADMIN — INSULIN GLARGINE 15 UNITS: 100 INJECTION, SOLUTION SUBCUTANEOUS at 14:33

## 2017-06-22 RX ADMIN — HEPARIN SODIUM 5000 UNITS: 5000 INJECTION, SOLUTION INTRAVENOUS; SUBCUTANEOUS at 05:40

## 2017-06-22 RX ADMIN — CHLORHEXIDINE GLUCONATE 15 ML: 1.2 RINSE ORAL at 08:08

## 2017-06-22 RX ADMIN — VANCOMYCIN HYDROCHLORIDE 2600 MG: 100 INJECTION, POWDER, LYOPHILIZED, FOR SOLUTION INTRAVENOUS at 00:43

## 2017-06-22 RX ADMIN — SODIUM CHLORIDE: 9 INJECTION, SOLUTION INTRAVENOUS at 16:55

## 2017-06-22 RX ADMIN — IPRATROPIUM BROMIDE AND ALBUTEROL SULFATE 3 ML: .5; 3 SOLUTION RESPIRATORY (INHALATION) at 10:38

## 2017-06-22 RX ADMIN — SODIUM CHLORIDE: 9 INJECTION, SOLUTION INTRAVENOUS at 00:44

## 2017-06-22 RX ADMIN — PIPERACILLIN SODIUM AND TAZOBACTAM SODIUM 4.5 G: 4; .5 INJECTION, POWDER, FOR SOLUTION INTRAVENOUS at 16:52

## 2017-06-22 RX ADMIN — PROPOFOL 20 MCG/KG/MIN: 10 INJECTION, EMULSION INTRAVENOUS at 20:58

## 2017-06-22 RX ADMIN — MONTELUKAST SODIUM 10 MG: 10 TABLET, FILM COATED ORAL at 08:08

## 2017-06-22 RX ADMIN — INSULIN HUMAN 10 UNITS: 100 INJECTION, SOLUTION PARENTERAL at 00:23

## 2017-06-22 RX ADMIN — METHYLPREDNISOLONE SODIUM SUCCINATE 62.5 MG: 125 INJECTION, POWDER, FOR SOLUTION INTRAMUSCULAR; INTRAVENOUS at 05:40

## 2017-06-22 RX ADMIN — FAMOTIDINE 20 MG: 20 TABLET, FILM COATED ORAL at 08:08

## 2017-06-22 NOTE — ED PROVIDER NOTES
ED Provider Note    Scribed for Arnaldo Medina M.D. by Tamar Crespo. 6/21/2017, 10:39 PM.    Primary care provider: Pcp Pt States None  Means of arrival: Jayne   History obtained from: Patient  History limited by: patient is unresponsive     CHIEF COMPLAINT  Chief Complaint   Patient presents with   • Shortness of Breath       HPI  Priya Callahan is a 66 y.o. female who presents to the Emergency Department brought in by EMS from skilled nursing due to worsening shortness of breath onset one hour prior to exam. The exact history is unclear secondary to the patient's unresponsiveness. The patient was at rehab following hospitalization for possible healthcare associated pneumonia. She was discharged from this facility 2 weeks ago.  EMS arrived to find the patient satting 67% on her normal 4 L of nasal cannula with diffuse audible wheezes. She was unresponsive and was given duo nebs and bpm respirations. Upon arrival the patient was somnolent but with oxygen saturations in the low 90s.  History of present illness limited by patient's unresponsiveness.     REVIEW OF SYSTEMS  See HPI, unable to obtain review of systems    Review of systems limited by patient's unresponsiveness.     PAST MEDICAL HISTORY   has a past medical history of COPD; ASTHMA; Hypertension; Pneumonia; Bronchitis; Unspecified urinary incontinence; Fall; Infectious disease; Arthritis; Fibromyalgia; Neuropathy (CMS-[facility]); Other specified symptom associated with female genital organs; Diabetes; Hepatitis C; Congestive heart failure (CMS-[facility]) (2013); Indigestion; GERD (gastroesophageal reflux disease); Breath shortness; Sleep apnea; Disorder of thyroid; Backpain; Heart burn; Pain (9/13/2016); Psychiatric problem (9/13/2016); On home oxygen therapy; RLS (restless legs syndrome); and PND (post-nasal drip).    SURGICAL HISTORY   has past surgical history that includes gyn surgery; other orthopedic surgery; other orthopedic surgery; us-needle core  "bx-breast panel; lumpectomy (Left); inj dx/ther agnt paravert facet joint, bruce* (Left, 7/10/2015); inj dx/ther agnt paravert facet joint, bruce* (7/10/2015); inj dx/ther agnt paravert facet joint, bruce* (7/10/2015); inject nerv blck,othr periph nerv (7/10/2015); inj dx/ther agnt paravert facet joint, bruce* (Left, 7/17/2015); inj dx/ther agnt paravert facet joint, bruce* (7/17/2015); inj dx/ther agnt paravert facet joint, bruce* (7/17/2015); inject nerv blck,othr periph nerv (7/17/2015); dstr nrolytc agnt parverteb fct sngl lmbr/sacral (Left, 10/2/2015); dstr nrolytc agnt parverteb fct addl lmbr/sacral (10/2/2015); inject rx other periph nerve (10/2/2015); dstr nrolytc agnt parverteb fct addl lmbr/sacral (10/2/2015); dstr nrolytc agnt parverteb fct sngl lmbr/sacral (Right, 11/6/2015); dstr nrolytc agnt parverteb fct addl lmbr/sacral (11/6/2015); inject rx other periph nerve (11/6/2015); dstr nrolytc agnt parverteb fct addl lmbr/sacral (11/6/2015); dstr nrolytc agnt parverteb fct sngl lmbr/sacral (Left, 9/16/2016); dstr nrolytc agnt parverteb fct addl lmbr/sacral (9/16/2016); dstr nrolytc agnt parverteb fct addl lmbr/sacral (9/16/2016); fluoroscopic guidance needle placement (9/16/2016); and ankle orif (Left, 5/8/2017).    SOCIAL HISTORY  Social History   Substance Use Topics   • Smoking status: Former Smoker -- 1.00 packs/day for 50 years     Types: Cigarettes     Quit date: 02/01/2017   • Smokeless tobacco: Never Used   • Alcohol Use: No      History   Drug Use No       FAMILY HISTORY  Family History   Problem Relation Age of Onset   • Lung Disease Mother    • Alcohol/Drug Mother    • Heart Disease Mother    • Cancer Father    • Cancer Brother    • Diabetes Maternal Grandmother    • Stroke Paternal Grandmother        CURRENT MEDICATIONS  Reviewed.  See Encounter Summary.     ALLERGIES  Allergies   Allergen Reactions   • Vitamin C Diarrhea     \"violent diarrhea\"   • Keflex Rash     Severe rash, has tolerated Augmentin and Zosyn " "(as of April 2017)   • Codeine Nausea     Severe stomach pain   • Lyrica Nausea     Nausea, dizzy   • Sudafed Nausea and Unspecified     Chest pain, nausea       PHYSICAL EXAM  VITAL SIGNS: /45 mmHg  Pulse 82  Temp(Src) 37.6 °C (99.7 °F)  Resp 20  Ht 1.638 m (5' 4.5\")  Wt 104 kg (229 lb 4.5 oz)  BMI 38.76 kg/m2  SpO2 97%  Constitutional: Somnolent, eyes close, nonresponsive   HENT: Normocephalic, Bilateral external ears normal. Nose normal.   Eyes: Conjunctiva normal, non-icteric, EOMI.    Thorax & Lungs: Faint wheezes at the bilateral apices, the patient has accessory muscle use and tachypnea, she is being assisted with BVM  Cardiovascular: Regular rate, Regular rhythm, No murmurs, rubs or gallops.  Abdomen:  Soft, nontender, no masses, obese   Skin: Visualized skin is  Dry, No erythema, No rash.   Extremities:   No cyanosis, clubbing or edema.  Neurologic: Alert, Grossly non-focal.   Psychiatric: Normal affect, Normal mood    DIAGNOSTIC STUDIES / PROCEDURES   Intubation Procedure Note    Indication: Respiratory failure    Consent: Unable to be obtained due to the emergent nature of this procedure.    Medications Used: etomidate intravenously, succinylcholine    Procedure: The patient was placed in the appropriate position.  Cricoid pressure was not required.  Intubation was performed by direct laryngoscopy using a laryngoscope and a 7.5 cuffed endotracheal tube.  The cuff was then inflated and the tube was secured appropriately at a distance of 23 cm to the dental ridge.  Initial confirmation of placement included an end tidal CO2 detector.  A chest x-ray to verify correct placement of the tube showed appropriate tube position.    The patient tolerated the procedure well.     Complications: None          EKG  Interpreted by me    Rhythm:  Normal sinus rhythm   Rate: 75   Axis: normal  Ectopy: none  Conduction: normal  ST Segments: no acute change no peaked T waves  T Waves: no acute change  Q Waves: " none  Clinical Impression: Artifact, without acute changes from prior EKG     RADIOLOGY  DX-CHEST-PORTABLE (1 VIEW)   Final Result      Mid and lower zone opacities, most consistent with pulmonary edema, overall worse compared with 6/13/2017.      DX-CHEST-PORTABLE (1 VIEW)    (Results Pending)   The radiologist's interpretation of all radiological studies have been reviewed by me.    COURSE & MEDICAL DECISION MAKING  Pertinent Labs & Imaging studies reviewed. (See chart for details)    10:39 PM - Called acutely to the room. Patient seen and examined at bedside. Intubation procedure performed by me, as indicated above. Patient will be treated with Solu-Medrol 125 mg IV, Unasyn 3 g IV, succinylcholine 200 mg IV, etomidate 20 mg IV, propofol continuous IV, and vancomycin 500 mL IVPB. Ordered chest x-ray, lactic acid, triglycerides, CBC with differential, CMP, troponin, blood culture, PT/INR, APTT, and EKG to evaluate her symptoms.     11:37 PM Recheck: patient is resting in bed. She is mildly responsive at this time and is able to open her eyes. I informed her that she will be sedated and admitted to the hospital.     11:31 PM Reviewed lab and radiology results. Patient will be treated with insulin regular 10 units IV.     11:38 PM Paged HORACE Carrasquillo.      11:50 PM - Patient's case was discussed with HORACE Carrasquillo. They agree to admit the patient.    11:53 PM Paged pulmonary.     12:09 AM I discussed the patient's case and the above findings with Dr. Curran (pulmonary) who will consult on the patient.     CRITICAL CARE  The very real possibilty of a deterioration of this patient's condition required the highest level of my preparedness for sudden, emergent intervention.  I provided critical care services, which included medication orders, frequent reevaluations of the patient's condition and response to treatment, ordering and reviewing test results, and discussing the case with various consultants.  The critical care time  associated with the care of the patient was 30 minutes. Review chart for interventions. This time is exclusive of any other billable procedures.       Decision Making:  This is a 66 y.o. year old female who presents with hypoxic respiratory arrest. I intubated the patient immediately upon arrival to the emergency department as she was obtunded requiring BVM assistance. I did not feel that she would be a candidate for BiPAP.    Laboratory evaluation reveals a significant leukocytosis and worsening pulmonary infiltrates. She has had multiple hospitalizations over the past few months. She'll be treated for healthcare associated pneumonia. She also does have a mild hyperkalemia. She received IV insulin and calcium gluconate. The patient does not have any acute EKG changes.    On reassessment following the intubation, the patient was more alert and able to follow some commands. I did explain the necessitation for the airway. She does require significant doses of sedatives. Unfortunately this caused hypotension so we will use a combination of propofol and fentanyl to avoid further hypotensive events.  Intravenous steroids and broad-spectrum antibiotics have been given. The patient will be admitted in critical condition.    DISPOSITION:  Patient will be admitted to [email or web address] in critical condition.      FINAL IMPRESSION  1. Acute respiratory failure with hypoxia (CMS-HCC)    2. Intubation procedure performed, see above.   3. Critical care time of 30 minutes performed. This is independent of all other billable procedures.      Tamar CLAY (Miguelibe), am scribing for, and in the presence of, Arnaldo Medina M.D..    Electronically signed by: Tamar Crespo (Taze), 6/21/2017    Arnaldo CALY M.D. personally performed the services described in this documentation, as scribed by Tamar Crespo in my presence, and it is both accurate and complete.    The note accurately reflects work and decisions made by me.  Arnaldo Medina   6/22/2017  2:24 AM

## 2017-06-22 NOTE — H&P
" [facility] Internal [facility] H&P Note    Name Priya Callahan     1950   Age/Sex 66 y.o. female   MRN [id number]   Code Status FULL     After 5PM or if no immediate response to page, please call for cross-coverage  Attending/Team: Dr. Hope/Waqar Call [phone] to page   1st Call - Day Sr. Resident (R2/R3):   GHULAM Lyons 2nd Call - Day Sr. Resident (R2/R3):   JORJE Verduzco         Chief Complaint:  dyspnea    HPI:  Ms. Callahan is a 67 y/o female with PMHx of COPD on 4L home O2, asthma, EDITH, CKD 3, h/o Afib, DM2, HTN, and PTSD who was brought in from a nursing home after being found by staff to be severely dyspneic.  Per ERP, patient was found by EMS to be slumped over and hypoxic to the 60s.  She was brought back to the 90s with BMV, but on arrival to ER was significantly altered and required intubation.  Per chart review, it appears patient was recently discharged from [facility] on 17 after being treated for sepsis 2/2 HCAP at which time she also required intubation.  Further history is not obtainable due to patient's condition.    Review of Systems   Unable to perform ROS: intubated           Past Medical History:   Past Medical History   Diagnosis Date   • COPD    • ASTHMA    • Hypertension    • Pneumonia      as a child   • Bronchitis      as a child   • Unspecified urinary incontinence    • Fall    • Infectious disease      Hepatitis C   • Arthritis      \"everywhere\"   • Fibromyalgia    • Neuropathy (CMS-[facility])      bilat feet   • Other specified symptom associated with female genital organs      Hysterectomy at age 22yrs   • Diabetes      Type 2   • Hepatitis C      \"I have markers in blood but not active\"   • Congestive heart failure (CMS-HCC) 2013     related to pulmonary failure   • Indigestion    • GERD (gastroesophageal reflux disease)    • Breath shortness      \"only with flare up with allergies\"   • Sleep apnea      does not wear CPAP, \"can't do it\"   • Disorder of thyroid      Hypothyroid " "  • Backpain      chronic back pain   • Heart burn    • Pain 9/13/2016     \"pain everywhere\"   • Psychiatric problem 9/13/2016     PTSD   • On home oxygen therapy    • RLS (restless legs syndrome)    • PND (post-nasal drip)        Past Surgical History:  Past Surgical History   Procedure Laterality Date   • Gyn surgery       hysterectomy    • Other orthopedic surgery       L knee replacement    • Other orthopedic surgery       R carpal tunnel    • Us-needle core bx-breast panel     • Lumpectomy Left      Left breast   • Pr inj dx/ther agnt paravert facet joint, bruce* Left 7/10/2015     Procedure: INJ PARA FACET L/S 1 LVL W/IG - L3-S1;  Surgeon: Xavier Arteaga D.O.;  Location: [facility] OR[facility];  Service: Pain Management   • Pr inj dx/ther agnt paravert facet joint, bruce*  7/10/2015     Procedure: INJ PARA FACET L/S 2D LVL W/IG;  Surgeon: Xavier Arteaga D.O.;  Location: [facility];  Service: Pain Management   • Pr inj dx/ther agnt paravert facet joint, bruce*  7/10/2015     Procedure: INJ PARA FACET L/S 3D LVL W/IG;  Surgeon: Xavier Arteaga D.O.;  Location: [facility];  Service: Pain Management   • Pr inject nerv cornelius,othr periph nerv  7/10/2015     Procedure: INJ-ANES AGENT-OTHER PHER.NRVE;  Surgeon: Xavier Arteaga D.O.;  Location: [facility];  Service: Pain Management   • Pr inj dx/ther agnt paravert facet joint, bruce* Left 7/17/2015     Procedure: INJ PARA FACET L/S 1 LVL W/IG - L3-S1;  Surgeon: Xavier Arteaga D.O.;  Location: [facility];  Service: Pain Management   • Pr inj dx/ther agnt paravert facet joint, bruce*  7/17/2015     Procedure: INJ PARA FACET L/S 2D LVL W/IG;  Surgeon: Xavier Arteaga D.O.;  Location: [facility];  Service: Pain Management   • Pr inj dx/ther agnt paravert facet joint, bruce*  7/17/2015     Procedure: INJ PARA FACET L/S 3D LVL W/IG;  Surgeon: Xavier Arteaga D.O.;  Location: SURGERY SURGICAL " [facility] ORS;  Service: Pain Management   • Pr inject nerv blck,othr periph nerv  7/17/2015     Procedure: INJ-ANES AGENT-OTHER PHER.NRVE;  Surgeon: Xavier Arteaga D.O.;  Location: [facility];  Service: Pain Management   • Pr dstr nrolytc agnt parverteb fct sngl lmbr/sacral Left 10/2/2015     Procedure: NEURO DEST FACET L/S W/IG SNGL - L3-S1;  Surgeon: Xavier Arteaga D.O.;  Location: [facility];  Service: Pain Management   • Pr dstr nrolytc agnt parverteb fct addl lmbr/sacral  10/2/2015     Procedure: NEURO DEST FACET L/S W/IG ADDL;  Surgeon: Xavier Arteaga D.O.;  Location: [facility];  Service: Pain Management   • Pr inject rx other periph nerve  10/2/2015     Procedure: NEUROLYTIC DEST-OTHER NERVE;  Surgeon: Xavier Arteaga D.O.;  Location: [facility];  Service: Pain Management   • Pr dstr nrolytc agnt parverteb fct addl lmbr/sacral  10/2/2015     Procedure: NEURO DEST FACET L/S W/IG ADDL;  Surgeon: Xavier Arteaga D.O.;  Location: [facility];  Service: Pain Management   • Pr dstr nrolytc agnt parverteb fct sngl lmbr/sacral Right 11/6/2015     Procedure: NEURO DEST FACET L/S W/IG SNGL - L3-S1;  Surgeon: Xavier Arteaga D.O.;  Location: SURGERY [facility];  Service: Pain Management   • Pr dstr nrolytc agnt parverteb fct addl lmbr/sacral  11/6/2015     Procedure: NEURO DEST FACET L/S W/IG ADDL;  Surgeon: Xavier Arteaga D.O.;  Location: SURGERY [facility];  Service: Pain Management   • Pr inject rx other periph nerve  11/6/2015     Procedure: NEUROLYTIC DEST-OTHER NERVE;  Surgeon: Xavier Arteaga D.O.;  Location: SURGERY [facility] ORS;  Service: Pain Management   • Pr dstr nrolytc agnt parverteb fct addl lmbr/sacral  11/6/2015     Procedure: NEURO DEST FACET L/S W/IG ADDL;  Surgeon: Xavier Arteaga D.O.;  Location: [facility];  Service: Pain Management   • Pr dstr nrolytc agnt  "parverteb fct sngl lmbr/sacral Left 9/16/2016     Procedure: NEURO DEST FACET L/S W/IG SNGL - L3-S1;  Surgeon: Xavier Arteaga D.O.;  Location: SURGERY [facility] ORS;  Service: Pain Management   • Pr dstr nrolytc agnt parverteb fct addl lmbr/sacral  9/16/2016     Procedure: NEURO DEST FACET L/S W/IG ADDL;  Surgeon: Xavier Arteaga D.O.;  Location: SURGERY [facility] ORS;  Service: Pain Management   • Pr dstr nrolytc agnt parverteb fct addl lmbr/sacral  9/16/2016     Procedure: NEURO DEST FACET L/S W/IG ADDL;  Surgeon: Xavier Arteaga D.O.;  Location: SURGERY [facility] OR[facility];  Service: Pain Management   • Pr fluoroscopic guidance needle placement  9/16/2016     Procedure: FLOURO GUIDE NEEDLE PLACEMENT;  Surgeon: Xavier Arteaga D.O.;  Location: SURGERY [facility];  Service: Pain Management   • Ankle orif Left 5/8/2017     Procedure: ANKLE ORIF;  Surgeon: Rico Gtz M.D.;  Location: [facility];  Service:        Current Outpatient Medications:  Home Medications     **Home medications have not yet been reviewed for this encounter**          Medication Allergy/Sensitivities:  Allergies   Allergen Reactions   • Vitamin C Diarrhea     \"violent diarrhea\"   • Keflex Rash     Severe rash, but TOLERATES PENICILLINS, Rocephin    • Codeine Nausea     Severe stomach pain   • Lyrica Nausea     Nausea, dizzy   • Sudafed Nausea and Unspecified     Chest pain, nausea       Family History:  Family History   Problem Relation Age of Onset   • Lung Disease Mother    • Alcohol/Drug Mother    • Heart Disease Mother    • Cancer Father    • Cancer Brother    • Diabetes Maternal Grandmother    • Stroke Paternal Grandmother        Social History:  Social History     Social History   • Marital Status:      Spouse Name: N/A   • Number of Children: N/A   • Years of Education: N/A     Occupational History   • Not on file.     Social History Main Topics   • Smoking status: Former Smoker -- " "1.00 packs/day for 50 years     Types: Cigarettes     Quit date: 02/01/2017   • Smokeless tobacco: Never Used   • Alcohol Use: No   • Drug Use: No   • Sexual Activity: Not on file     Other Topics Concern   • Not on file     Social History Narrative       Living situation: [facility]  PCP : not obtainable       Physical Exam   Filed Vitals:    06/22/17 0031 06/22/17 0043 06/22/17 0050 06/22/17 0100   BP:       Pulse: 54 53 53 53   Temp:       Resp:    24   Height:       Weight:       SpO2: 97% 95% 95% 96%     Body mass index is 38.76 kg/(m^2).  /45 mmHg  Pulse 53  Temp(Src) 37.6 °C (99.7 °F)  Resp 24  Ht 1.638 m (5' 4.5\")  Wt 104 kg (229 lb 4.5 oz)  BMI 38.76 kg/m2  SpO2 96%  O2 therapy: Pulse Oximetry: 96 %, O2 Delivery: Ventilator    Physical Exam   Constitutional: She is well-developed, well-nourished, and in no distress.   HENT:   Head: Normocephalic and atraumatic.   Mouth/Throat: Oropharynx is clear and moist.   Eyes: Pupils are equal, round, and reactive to light.   Neck: Neck supple. No JVD present. No tracheal deviation present. No thyromegaly present.   Cardiovascular: Normal rate, regular rhythm and normal heart sounds.  Exam reveals no gallop and no friction rub.    No murmur heard.  Pulmonary/Chest: She has wheezes. She has no rales.   Abdominothoracic respirations   Musculoskeletal: She exhibits edema.   + brace in place over LLE   Lymphadenopathy:     She has no cervical adenopathy.   Neurological:   Follows commands.  Moving all extermities   Skin: Skin is warm and dry. She is not diaphoretic.   Nursing note and vitals reviewed.           Data Review       Old Records Request:   Completed  Current Records review and summary: Completed    Lab Data Review:  Recent Results (from the past 24 hour(s))   CBC w/ Differential    Collection Time: 06/21/17 10:40 PM   Result Value Ref Range    WBC 24.6 (H) 4.8 - 10.8 K/uL    RBC 3.03 (L) 4.20 - 5.40 M/uL    Hemoglobin 8.6 (L) 12.0 - 16.0 g/dL    " Hematocrit 29.0 (L) 37.0 - 47.0 %    MCV 95.7 81.4 - 97.8 fL    MCH 28.4 27.0 - 33.0 pg    MCHC 29.7 (L) 33.6 - 35.0 g/dL    RDW 60.2 (H) 35.9 - 50.0 fL    Platelet Count 461 (H) 164 - 446 K/uL    MPV 8.6 (L) 9.0 - 12.9 fL    Neutrophils-Polys 86.80 (H) 44.00 - 72.00 %    Lymphocytes 4.80 (L) 22.00 - 41.00 %    Monocytes 4.50 0.00 - 13.40 %    Eosinophils 1.80 0.00 - 6.90 %    Basophils 0.60 0.00 - 1.80 %    Immature Granulocytes 1.50 (H) 0.00 - 0.90 %    Nucleated RBC 0.10 /100 WBC    Neutrophils (Absolute) 21.39 (H) 2.00 - 7.15 K/uL    Lymphs (Absolute) 1.18 1.00 - 4.80 K/uL    Monos (Absolute) 1.10 (H) 0.00 - 0.85 K/uL    Eos (Absolute) 0.45 0.00 - 0.51 K/uL    Baso (Absolute) 0.15 (H) 0.00 - 0.12 K/uL    Immature Granulocytes (abs) 0.36 (H) 0.00 - 0.11 K/uL    NRBC (Absolute) 0.03 K/uL   Complete Metabolic Panel (CMP)    Collection Time: 06/21/17 10:40 PM   Result Value Ref Range    Sodium 132 (L) 135 - 145 mmol/L    Potassium 6.1 (H) 3.6 - 5.5 mmol/L    Chloride 102 96 - 112 mmol/L    Co2 22 20 - 33 mmol/L    Anion Gap 8.0 0.0 - 11.9    Glucose 179 (H) 65 - 99 mg/dL    Bun 29 (H) 8 - 22 mg/dL    Creatinine 1.41 (H) 0.50 - 1.40 mg/dL    Calcium 9.0 8.5 - 10.5 mg/dL    AST(SGOT) 16 12 - 45 U/L    ALT(SGPT) 15 2 - 50 U/L    Alkaline Phosphatase 59 30 - 99 U/L    Total Bilirubin 0.8 0.1 - 1.5 mg/dL    Albumin 3.4 3.2 - 4.9 g/dL    Total Protein 6.9 6.0 - 8.2 g/dL    Globulin 3.5 1.9 - 3.5 g/dL    A-G Ratio 1.0 g/dL   Troponin STAT    Collection Time: 06/21/17 10:40 PM   Result Value Ref Range    Troponin I 0.02 0.00 - 0.04 ng/mL   LACTIC ACID    Collection Time: 06/21/17 10:40 PM   Result Value Ref Range    Lactic Acid 0.9 0.5 - 2.0 mmol/L   PT/INR    Collection Time: 06/21/17 10:40 PM   Result Value Ref Range    PT 14.4 12.0 - 14.6 sec    INR 1.09 0.87 - 1.13   APTT    Collection Time: 06/21/17 10:40 PM   Result Value Ref Range    APTT 34.3 24.7 - 36.0 sec   ESTIMATED GFR    Collection Time: 06/21/17 10:40 PM    Result Value Ref Range    GFR If African American 45 (A) >60 mL/min/1.73 m 2    GFR If Non  37 (A) >60 mL/min/1.73 m 2   EKG (NOW)    Collection Time: 17 10:42 PM   Result Value Ref Range    Report       [facility] Emergency Dept.    Test Date:  2017  Pt Name:    CHAITANYA CABELLO            Department: ER  MRN:        [id number]                      Room:       [facility]  Gender:     F                            Technician: [id number]  :        1950                   Requested By:SHE CARR  Order #:    [id number]                    Reading MD:    Measurements  Intervals                                Axis  Rate:       75                           P:          5  NH:         168                          QRS:        6  QRSD:       86                           T:          75  QT:         352  QTc:        394    Interpretive Statements  SINUS RHYTHM  PROBABLE LEFT ATRIAL ABNORMALITY  BORDERLINE LOW VOLTAGE IN FRONTAL LEADS  ABNORMAL T, CONSIDER ISCHEMIA, LATERAL LEADS  Compared to ECG 2017 07:50:12  T-wave abnormality now present  Possible ischemia now present     ISTAT ARTERIAL BLOOD GAS    Collection Time: 17 12:16 AM   Result Value Ref Range    Ph 7.218 (LL) 7.400 - 7.500    Pco2 58.0 (HH) 26.0 - 37.0 mmHg    Po2 146 (H) 64 - 87 mmHg    Tco2 25 20 - 33 mmol/L    S02 99 93 - 99 %    Hco3 23.6 17.0 - 25.0 mmol/L    BE -4 -4 - 3 mmol/L    Body Temp see below degrees    O2 Therapy 100 %    Specimen Arterial        Imaging/Procedures Review:    ndependant Imaging Review: Completed  DX-CHEST-PORTABLE (1 VIEW)   Final Result      Mid and lower zone opacities, most consistent with pulmonary edema, overall worse compared with 2017.      DX-CHEST-PORTABLE (1 VIEW)    (Results Pending)      EKG:   EKG Independant Review: Completed  QTc:394 ms, HR: 75 bpm, Normal Sinus Rhythm, no acute ST/T changes     (y) Records reviewed and summarized in current  documentation             Assessment/Plan     ACUTE HYPOXIC RESPIRATORY FAILURE  CHRONIC RESPIRATORY FAILURE  AECOPD  - CXR shows worsened bilateral LL infiltrates; echo normal in 3/2017; LE US neg for DVT in 6/2017; CTPE neg in 6/2017; echo in 2015 shows normal EF with G2 diastolic dysfunction and pulmonary htn; exam shows wheezing; likely 2/2 COPD exacerbation 2/2 HCAP; intubated and sedated; plan for bronchoscopy once moved to ICU; RT protocol; solumedrol; abx as below    SEPSIS 2/2 RESPIRATORY SOURCE  HCAP  LEUKOCYTOSIS  - cultures from prior admission all neg; UA neg; CXR shows worsened infiltrates; bl/urine cultures pending; will start patient on IVF hydration and broad spectrum abx (vanco/zosyn); she has had multiple recent hosptial admissions; check procalcitonin    CHRONIC NORMOCYTIC ANEMIA - h/h stable since last discharge    HYPERKALEMIA - insulin given in ER; will give one dose kayexalate and CTM    CKD-3 - stable at baseline; avoid nephrotoxins    DM2 - check a1c; SSI with Accuchecks; hypoglycemia protocol    HTN - BP on lower side currently due to propofol; hold all home meds    CHRONIC HEP C - stable    FIBROMYALGIA - hold home pain meds    ASTHMA - continue monteleukast    HYPOTHYROIDISM - continue home synthroid      Anticipated Hospital stay:  >2 midnights      Quality Measures  EKG reviewed, Labs reviewed, Medications reviewed and Radiology images reviewed  Mckeon catheter: Unconscious / Sedated Patient on a Ventilator  Central line in place: Need for access and Sepsis    DVT Prophylaxis: Heparin  DVT prophylaxis - mechanical: SCDs  Ulcer prophylaxis: Yes  Antibiotics: Treating active infection/contamination beyond 24 hours perioperative coverage

## 2017-06-22 NOTE — ED NOTES
Changing sedation gtt d/t BP dropping with increasing of propofol gtt and pt continues to be slightly awake and looking around, following commands.  ERP placing new orders.  PMA at bedside.  Report given to SHITAL Perez.

## 2017-06-22 NOTE — DIETARY
"Nutrition Support (cortrak) Assessment - Female    Priya Callahan is a 66 y.o. female with admitting DX of Acute respiratory failure requiring reintubation   Past Medical History   Diagnosis Date   • COPD    • ASTHMA    • Hypertension    • Pneumonia      as a child   • Bronchitis      as a child   • Unspecified urinary incontinence    • Fall    • Infectious disease      Hepatitis C   • Arthritis      \"everywhere\"   • Fibromyalgia    • Neuropathy (CMS-HCC)      bilat feet   • Other specified symptom associated with female genital organs      Hysterectomy at age 22yrs   • Diabetes      Type 2   • Hepatitis C      \"I have markers in blood but not active\"   • Congestive heart failure (CMS-[facility]) 2013     related to pulmonary failure   • Indigestion    • GERD (gastroesophageal reflux disease)    • Breath shortness      \"only with flare up with allergies\"   • Sleep apnea      does not wear CPAP, \"can't do it\"   • Disorder of thyroid      Hypothyroid   • Backpain      chronic back pain   • Heart burn    • Pain 9/13/2016     \"pain everywhere\"   • Psychiatric problem 9/13/2016     PTSD   • On home oxygen therapy    • RLS (restless legs syndrome)    • PND (post-nasal drip)      Past Surgical History   Procedure Laterality Date   • Gyn surgery       hysterectomy    • Other orthopedic surgery       L knee replacement    • Other orthopedic surgery       R carpal tunnel    • Us-needle core bx-breast panel     • Lumpectomy Left      Left breast   • Pr inj dx/ther agnt paravert facet joint, bruce* Left 7/10/2015     Procedure: INJ PARA FACET L/S 1 LVL W/IG - L3-S1;  Surgeon: Xavier Arteaga D.O.;  [facility];  Service: Pain Management   • Pr inj dx/ther agnt paravert facet joint, bruce*  7/10/2015     Procedure: INJ PARA FACET L/S 2D LVL W/IG;  Surgeon: Xavier Arteaga D.O.;  [facility];  Service: Pain Management   • Pr inj dx/ther agnt paravert facet joint, bruce*  7/10/2015 "     Procedure: INJ PARA FACET L/S 3D LVL W/IG;  Surgeon: Xavier Arteaga D.O.;  Location: [facility];  Service: Pain Management   • Pr inject nerv blck,othr periph nerv  7/10/2015     Procedure: INJ-ANES AGENT-OTHER PHER.NRVE;  Surgeon: Xavier Arteaga D.O.;  Location: SURGERY [facility];  Service: Pain Management   • Pr inj dx/ther agnt paravert facet joint, bruce* Left 7/17/2015     Procedure: INJ PARA FACET L/S 1 LVL W/IG - L3-S1;  Surgeon: Xavier Arteaga D.O.;  Location: SURGERY [facility];  Service: Pain Management   • Pr inj dx/ther agnt paravert facet joint, bruce*  7/17/2015     Procedure: INJ PARA FACET L/S 2D LVL W/IG;  Surgeon: Xavier Arteaga D.O.;  Location: SURGERY [facility];  Service: Pain Management   • Pr inj dx/ther agnt paravert facet joint, bruce*  7/17/2015     Procedure: INJ PARA FACET L/S 3D LVL W/IG;  Surgeon: Xavier Arteaga D.O.;  Location: SURGERY [facility];  Service: Pain Management   • Pr inject nerv blck,othr periph nerv  7/17/2015     Procedure: INJ-ANES AGENT-OTHER PHER.NRVE;  Surgeon: Xavier Arteaga D.O.;  Location: SURGERY [facility];  Service: Pain Management   • Pr dstr nrolytc agnt parverteb fct sngl lmbr/sacral Left 10/2/2015     Procedure: NEURO DEST FACET L/S W/IG SNGL - L3-S1;  Surgeon: Xavier Arteaga D.O.;  Location: SURGERY [facility];  Service: Pain Management   • Pr dstr nrolytc agnt parverteb fct addl lmbr/sacral  10/2/2015     Procedure: NEURO DEST FACET L/S W/IG ADDL;  Surgeon: Xavier Arteaga D.O.;  Location: SURGERY [facility];  Service: Pain Management   • Pr inject rx other periph nerve  10/2/2015     Procedure: NEUROLYTIC DEST-OTHER NERVE;  Surgeon: Xavier Arteaga D.O.;  [facility];  Service: Pain Management   • Pr dstr nrolytc agnt parverteb fct addl lmbr/sacral  10/2/2015     Procedure: NEURO DEST FACET L/S W/IG ADDL;  Surgeon: Xavier Arteaga,  EBONY;  Location: SURGERY [facility];  Service: Pain Management   • Pr dstr nrolytc agnt parverteb fct sngl lmbr/sacral Right 11/6/2015     Procedure: NEURO DEST FACET L/S W/IG SNGL - L3-S1;  Surgeon: Xavier Arteaga D.O.;  Location: [facility];  Service: Pain Management   • Pr dstr nrolytc agnt parverteb fct addl lmbr/sacral  11/6/2015     Procedure: NEURO DEST FACET L/S W/IG ADDL;  Surgeon: Xavier Arteaga D.O.;  Location: SURGERY [facility];  Service: Pain Management   • Pr inject rx other periph nerve  11/6/2015     Procedure: NEUROLYTIC DEST-OTHER NERVE;  Surgeon: Xavier Arteaga D.O.;  Location: SURGERY [facility];  Service: Pain Management   • Pr dstr nrolytc agnt parverteb fct addl lmbr/sacral  11/6/2015     Procedure: NEURO DEST FACET L/S W/IG ADDL;  Surgeon: Xavier Arteaga D.O.;  Location: SURGERY [facility];  Service: Pain Management   • Pr dstr nrolytc agnt parverteb fct sngl lmbr/sacral Left 9/16/2016     Procedure: NEURO DEST FACET L/S W/IG SNGL - L3-S1;  Surgeon: Xavier Arteaga D.O.;  Location: SURGERY [facility];  Service: Pain Management   • Pr dstr nrolytc agnt parverteb fct addl lmbr/sacral  9/16/2016     Procedure: NEURO DEST FACET L/S W/IG ADDL;  Surgeon: Xavier Arteaga D.O.;  Location: SURGERY [facility];  Service: Pain Management   • Pr dstr nrolytc agnt parverteb fct addl lmbr/sacral  9/16/2016     Procedure: NEURO DEST FACET L/S W/IG ADDL;  Surgeon: Xavier Arteaga D.O.;  Location: SURGERY [facility];  Service: Pain Management   • Pr fluoroscopic guidance needle placement  9/16/2016     Procedure: FLOURO GUIDE NEEDLE PLACEMENT;  Surgeon: Xavier Arteaga D.O.;  Location: SURGERY [facility];  Service: Pain Management   • Ankle orif Left 5/8/2017     Procedure: ANKLE ORIF;  Surgeon: Rico Gtz M.D.;  Location: SURGERY [facility];  Service:      Allergies: Vitamin C; Keflex; Codeine;  "Lyrica; and Sudafed  Height: 163.8 cm (5' 4.49\")  Weight: 104.2 kg (229 lb 11.5 oz) (Weighed with boot on)  Weight to Use in Calculations: 103 kg (227 lb 1.2 oz)  Ideal Body Weight: 54.432 kg (120 lb)  Percent Ideal Body Weight: 191.4  Body mass index is 38.84 kg/(m^2).    Pertinent Labs: Gluc 179, BUN 29, creat 1.14, Na 132, K 6.1   Pertinent Medications: Heparin, Insulin, Synthroid, Adult ICU electrolyte replacement protocol, Solumedrol, Levo (currenlty at 6 mcg/min), Zosyn, Propofol (currenlty at 12.5 mL/hr = 330 kcals/day)   Pertinent Fluids: IVF NS at 50 mL/hr   Skin: no skin breakdown noted at this time   Last BM:  (Prior to Arrival)    Estimated Needs: MSJ x 1.0 = 1560 kcals; PSU 2010 = 1688 kcals (Ve: 7.1, Tmax/24 hours: 37.8C); 65-70% of RMR = 1100 - 1180 kcals   Total Calories / day: 1130 - 1440 (Calories / k - 14)  Total Grams Protein / day: 100 - 120 (Grams Protein / kg of ideal body weight: 1.8 - 2.2); (Grams Protein / kg of actual body weight: 0.9 - 1.2)  Total Fluids ml / day: 2579.6 ml        Assessment / Evaluation: Pt currently intubated with cortrak in place and receiving TF protocol. Per chart review, pt was hospitalized at [facility] from 17 to 05/10/2017, with an ankle fracture and was then readmitted to [facility] 17 to 2017, with ventilator dependent respiratory failure and healthcare-associated pneumonia. When pt was intubated during previous admission, she was receiving nutrition support via cortrak which she tolerated well.    Will provide specialized TF formula, Peptamen Intense VHP as pt is receiving propofol and pressor support.  Pt is also obese with BMI >30 (38.4), obesity class II; will hypocallorically feed per SCCM guidelines. Specialized TF formula will provide appropriate kcals and protein while hypocallorically feeding.     Plan / Recommendation:  · On propofol: Provide Peptamen Intense VHP and advance per protocol to goal rate of 45 mL/hr.  TF at goal will provide " 1080 kcals (+kcals from propofol), 101 gm protein and 907 mL free water per day   · Off propofol: Advance Peptamen Intense VHP to final goal rate of 50 mL/hr.  TF at goal will provide 1200 kcals, 1123 gm protein and 1008 mL free water per day   · Fluids per MD   · RD will order metabolic cart to help determine appropriate kcal needs   · Monitor wt and lab trends     RD following

## 2017-06-22 NOTE — CARE PLAN
Problem: Ventilation Defect:  Goal: Ability to achieve and maintain unassisted ventilation or tolerate decreased levels of ventilator support  Outcome: PROGRESSING SLOWER THAN EXPECTED  Adult Ventilation Update    Total Vent Days: 1      Patient Lines/Drains/Airways Status    Active Airway      Name: Placement date: Placement time: Site: Days:     Airway Group ET Tube Oral 8.0 06/21/17 2245  Oral  less than 1                 PT has not yet received SBT's     Plateau Pressure (Q Shift): 24 (06/22/17 0151)  Static Compliance (ml / cm H2O): 37 (06/22/17 0430)    Patient failed trials because of Barriers to Wean: FiO2 >50% OR PEEP >8 (PMA Only) (06/22/17 0151)  Barriers to SBT    Length of Weaning Trial      Sputum/Suction   Cough: Productive (06/22/17 0151)  Sputum Amount: Small (06/22/17 0151)  Sputum Color: Tan;Yellow (06/22/17 0151)  Sputum Consistency: Thin (06/22/17 0151)    Mobility Group  Activity Performed: Unable to mobilize (06/22/17 0151)  Pt Calls for Assistance: No (06/22/17 0400)    Events/Summary/Plan: Recieved from ED placed on vent. Campa at bedside performing bronch (06/22/17 0151)

## 2017-06-22 NOTE — CARE PLAN
Problem: Pain Management  Goal: Pain level will decrease to patient’s comfort goal  Outcome: PROGRESSING AS EXPECTED  Fentanyl gtt d/cd this morning. CPOT score in use and patient scoring zero and appears comfortable.     Problem: Urinary Elimination:  Goal: Ability to reestablish a normal urinary elimination pattern will improve  Outcome: PROGRESSING SLOWER THAN EXPECTED  Patient remains sedated on the ventilator and critically ill with Mckeon cathter in place. CAUTI prevention measures in place.

## 2017-06-22 NOTE — OP REPORT
DATE OF SERVICE:  06/22/2017    TITLE OF THE PROCEDURE:  Central venous catheter placement.    INDICATION FOR THE PROCEDURE:  Hypotension.    NARRATIVE:  The right neck was prepped with chlorhexidine and draped in the   usual sterile fashion.  Xylocaine 1% solution was used for topical anesthesia.    A triple lumen central venous catheter was easily placed into the right   internal jugular vein under ultrasound guidance using the technique described   by Ginny without difficulty or apparent complication.  The line was   sutured into place and a sterile dressing was placed over the line.  All 3   ports flushed and returned venous blood easily.  The patient tolerated the   procedure quite nicely.  No complications are apparent.  A chest x-ray will be   obtained in the next few minutes to confirm placement.  This procedure was   performed by Dr. Carito Richards, [facility] internal medicine resident.  I was   present during the procedure, participated in the procedure, and supervised   the procedure.       ____________________________________     MD OLIVE SANTOS / DMITRY    DD:  06/22/2017 02:38:22  DT:  06/22/2017 04:00:35    D#:  [id number]  Job#:  [id number]

## 2017-06-22 NOTE — PROCEDURES
Indication: vascular access, sepsis  Resident: ANKIT Richards  Attending: Dr. Coreas  A time-out was completed verifying correct patient, procedure, site, positioning, and special equipment if applicable. The patient was placed in a dependent position appropriate for central line placement based on the vein to be cannulated. The patient’s right right neck was prepped and draped in sterile fashion. 1% Lidocaine was used to anesthetize the surrounding skin area. A triple lumen 9-Malian Cordis catheter was introduced into the the internal jugular vein using the Seldinger technique and under ultrasound guidance. The catheter was threaded smoothly over the guide wire and appropriate blood return was obtained. Each lumen of the catheter was evacuated of air and flushed with sterile saline. The catheter was then sutured in place to the skin and a sterile dressing applied. Perfusion to the extremity distal to the point of catheter insertion was checked and found to be adequate. Dr. Coreas was present for the entire procedure.    Estimated Blood Loss: <5 mL  The patient tolerated the procedure well and there were no complications.  Post-procedure CXR was ordered to confirm positioning.

## 2017-06-22 NOTE — CARE PLAN
Problem: Ventilation Defect:  Goal: Ability to achieve and maintain unassisted ventilation or tolerate decreased levels of ventilator support  Intervention: Support and monitor invasive and noninvasive mechanical ventilation  Adult Ventilation Update    Total Vent Days: 2      Patient Lines/Drains/Airways Status    Active Airway      Name: Placement date: Placement time: Site: Days:     Airway Group ET Tube Oral 8.0 06/21/17  2245  Oral  less than 1               Plateau Pressure (Q Shift): 14 (06/22/17 0702)  Static Compliance (ml / cm H2O): 97 (06/22/17 1450)    Patient failed trials because of Barriers to Wean: FiO2 >50% OR PEEP >8 (PMA Only) (06/22/17 0702)    Sputum/Suction   Cough: Strong (06/22/17 1200)  Sputum Amount: Scant (06/22/17 1450)  Sputum Color: Unable to Evaluate (06/22/17 1450)  Sputum Consistency: Unable to Evaluate (06/22/17 1450)    Events/Summary/Plan: Report received, orders reviewed. (06/22/17 0702) FiO2 decreased to 50%. No other changes made to vent settings today. No SBTs today.

## 2017-06-22 NOTE — H&P
DATE OF ADMISSION:  06/22/2017.    DATE OF SERVICE:  06/22/2017.    REASON FOR ADMISSION:  Respiratory failure.    CHIEF COMPLAINT:  The patient cannot provide.    HISTORY OF PRESENT ILLNESS:  This is the attending physician history and   physical, see the resident's history and physical for additional details.  I   was called to the emergency room to see and evaluate this lady for ICU   admission.  This is a 66-year-old lady who has a history of chronic hypoxemic   respiratory failure and chronic obstructive pulmonary disease.  She was   hospitalized at [facility] from on or about May 7th to 05/10/2017, with an ankle   fracture.  She was then readmitted to [facility] from on or about June 2nd to   06/09/2017, with ventilator dependent respiratory failure and   healthcare-associated pneumonia.  She was subsequently sent to a skilled   nursing facility.  She now comes back to the emergency room this evening   because of increasing shortness of breath.  She was brought in here with   wheezing and coarse crackles.  She was in respiratory distress.  She was   intubated.  She is now on full mechanical ventilatory support.  Once again,   she has been more residing at a skilled nursing facility.    CURRENT MEDICATIONS:  She is on amlodipine 10 mg a day, budesonide/formoterol   160/4.5 two puffs twice a day, carvedilol 3.125 mg twice a day,   cholecalciferol 2000 units daily, cyanocobalamin 1000 mcg a day,   cyclobenzaprine 10 mg three times a day, duloxetine 60 mg a day, iron   supplements daily, folic acid 800 mcg a day, furosemide 20 mg a day,   gabapentin 600 mg three times a day, and hydrocortisone 5 mg daily.  She is on   Lantus 22 units in the evening and sliding scale Humalog insulin.  She is on   ipratropium/albuterol 20/100 one puff as needed, levothyroxine 75 mcg a day,   loperamide 2 mg as needed, montelukast 10 mg a day, multivitamin daily,   omeprazole 20 mg a day, oxycodone as needed, potassium supplements, she is on    tiotropium 18 mcg a day, and trazodone 300 mg in the evening.    ALLERGIES:  TO VITAMIN C, KEFLEX, CODEINE, LYRICA, AND SUDAFED.    PAST SURGICAL HISTORY:  She has had a hysterectomy, left total knee   arthroplasty, right carpal tunnel release, left breast lumpectomy, and left   ankle open reduction and internal fixation on 05/08/2017, for her recent ankle   fracture.    ILLNESSES:  Chronic hypoxemic respiratory failure.  She is on 4 liters of   domiciliary oxygen 24 hours a day, chronic obstructive pulmonary disease,   asthma, obstructive sleep apnea, gastroesophageal reflux disease, hepatitis C,   systemic arterial hypertension, diabetes mellitus type 2, posttraumatic   stress disorder, chronic kidney disease, atrial fibrillation, fibromyalgia,   chronic diastolic heart failure with grade II diastolic dysfunction, and   pulmonary hypertension with right ventricular systolic pressure of 60 mmHg.    SOCIAL HISTORY:  She quit smoking in February of this year.    FAMILY HISTORY:  Not obtainable.    REVIEW OF SYSTEMS:  Not obtainable.    PHYSICAL EXAMINATION:  VITAL SIGNS:  Her temperature is 99.7, blood pressure 95/40, heart rate 56,   and respiratory rate is 28.  GENERAL:  She is a sedated lady on the ventilator.  HEENT:  Normocephalic and atraumatic.  Sinuses are nontender.  Nares patent.    Oropharynx with moist mucous membranes.  An endotracheal tube is in place.  NECK:  Trachea midline, supple.  CHEST:  Symmetrical.  HEART:  Regular rhythm.  LUNGS:  She has got scattered expiratory wheezes bilaterally with a prolonged   expiratory phase.  There are scattered coarse and fine crackles bilaterally.  ABDOMEN:  Obese, soft, nondistended, and nontender.  EXTREMITIES:  No clubbing or cyanosis.  NEUROLOGIC:  She is sedated.  She does arouse and nods, and squeezes hands   bilaterally to command.    LABORATORY RESULTS AND DIAGNOSTIC DATA:  Her white blood cell count is 24,600,   hemoglobin 8.6, hematocrit 29.0, and  platelet count 461,000.  Sodium 132,   potassium 6.1, chloride 102, CO2 22, BUN 29, creatinine 1.41, and glucose 179.    Arterial blood gas shows a pH of 7.22, pCO2 of 58, and pO2 of 146.  Her   chest x-ray shows worsening bilateral opacities compared to 06/13/2017.    IMPRESSION:  1.  Ventilator-dependent respiratory failure.  2.  Acute on chronic hypercarbic and hypoxemic respiratory failure.  3.  Acute exacerbation of chronic obstructive pulmonary disease.  4.  Severe sepsis, pulmonary source.  5.  Health-care-associated pneumonia.  6.  Possible coexisting acute pulmonary edema secondary to acute on chronic   diastolic heart failure.  7.  Query acute on chronic diastolic heart failure.  She has grade II   diastolic dysfunction.  8.  Pulmonary hypertension with right ventricular systolic pressure of 60   mmHg.  9.  Obstructive sleep apnea.  10.  Gastroesophageal reflux disease.  11.  Hepatitis C.  12.  History of systemic arterial hypertension.  13.  Diabetes mellitus type 2.  14.  Chronic kidney disease.  15.  History of atrial fibrillation.  16.  Fibromyalgia.  17.  Post-traumatic stress disorder.  18.  Anemia.  19.  Hyponatremia.  20.  Hyperkalemia.  21.  Recent ankle fracture in May of this year.    PLAN/MEDICAL DECISION MAKING:  This lady is critically ill.  She is intubated   on full mechanical ventilatory support.  She is going to be admitted to the   intensive care unit.  Deep venous thrombosis prophylaxis and stress ulcer   prophylaxis will both be provided.  I am going to place her on intravenous   methylprednisolone.  Bronchodilators will be delivered in line.  She has been   placed on the sepsis protocol.  We will culture her urine, blood, and sputum.    We will empirically place her on broad-spectrum intravenous antimicrobial   chemotherapy with piperacillin/tazobactam, as well as vancomycin pending   culture results.  She is at risk for healthcare healthcare-associated   pathogens with her  hospitalizations recently, as well as the fact that she   resides in a skilled nursing facility.  She has received intravenous insulin   for her hyperkalemia.  We are going to give her a dose of Kayexalate.  We will   follow her electrolytes very closely.  Her blood pressure is running on the   low side.  A central venous catheter will be placed.  She may require   vasopressor support.  She will receive the appropriate amount of intravenous   crystalloid solution for her severe sepsis.  We will monitor her central   venous pressure.  Her blood sugars will be controlled.  We will follow her   electrolytes and renal function very closely.  At the current time, her   prognosis is guarded and she is critically ill.  I have spent 90 minutes   providing direct critical care services at the bedside.  There has been no   time overlap.  The time spent excludes the time spent performing procedures   (86297, [id number]).    The case has been reviewed with the medical residents, nursing, and   respiratory therapy.       ____________________________________     MD OLIVE SANTOS / DMITRY    DD:  06/22/2017 01:57:28  DT:  06/22/2017 04:12:26    D#:  [id number]  Job#:  [id number]    cc: JULIANN VELASQUEZ MD

## 2017-06-22 NOTE — ED NOTES
Pt waking up and looking around.  ERP informed and at bedside.  POC discussed with pt.  Propofol increased for sedation.

## 2017-06-22 NOTE — OP REPORT
DATE OF SERVICE:  06/22/2017    TITLE OF THE PROCEDURE:  Diagnostic and therapeutic flexible fiberoptic   bronchoscopy.    INDICATION FOR THE PROCEDURE:  Atelectasis, pneumonia.    POSTPROCEDURE DIAGNOSES:  1.  Normal endobronchial anatomy.  2.  No endobronchial tumor identified.  3.  Moderate amounts of juicy, tan secretions seen bilaterally, suctioned   until clear.    NARRATIVE:  The patient was intubated, sedated and ventilated at the time   of this procedure.  The flexible fiberoptic bronchoscope was inserted through   the lumen of the endotracheal tube and advanced into the distal trachea   without difficulty.  The airways were examined to the subsegmental bronchus   level bilaterally.  The endobronchial anatomy was normal.  No tumor was   identified.  There was a moderate amount of juicy, tan secretions seen   bilaterally and these were suctioned until clear.  Bilateral bronchial   washings from all lobes of both lungs were submitted to the laboratory for   cytology, Gram stain, culture and sensitivity, acid fast bacilli smear and   culture and fungal culture.  The patient tolerated the procedure quite nicely.    No complications are apparent.  Her heart rate and rhythm, blood pressure   and oxygen saturation were continuously monitored.       ____________________________________     MD OLIVE SANTOS / DMITRY    DD:  06/22/2017 01:44:40  DT:  06/22/2017 02:08:23    D#:  [id number]  Job#:  [id number]

## 2017-06-22 NOTE — PROGRESS NOTES
Bedside report received from SHITAL Odell. Patient assessed and lines and restraints verified. VSS and patient resting comfortably on APVcmv 20/3330/8/60%. Bed low and locked with alarm on. Communication board updated.

## 2017-06-22 NOTE — PROGRESS NOTES
"Pharmacy Kinetics 66 y.o. female on vancomycin day # 1 2017    Vancomycin New Start    2600 mg iv loading dose administered  @ 0043      Indication for Treatment:   HCAP    Pertinent history per medical record:   Admitted on 2017 from [facility] for respiratory distress and hypoxia. Patient has a complex medical history, including multiple admissions, most recently - for HCAP requiring intubation. EMS found pt with oxygen saturation in the 60s, and she was brought in on 15L Oxymask, but required intubation secondary to AMS. Pt with no fever on arrival, but with leukocytosis = 24.6K. Empiric abx have been initiated for presumed HCAP.     PMH: COPD on 4L home O2, asthma, CKD, DM    Imaging studies:    CXR: The heart is enlarged. Combined interstitial and airspace opacities are present in both lungs. Small bilateral pleural effusions may also be present.     Other antibiotics:   Zosyn 4.5 gm iv q6h     Allergies:  Vitamin c; Keflex; Codeine; Lyrica; and Sudafed     List concerns for renal function:   Elevated renal indices, hypotension requiring vasopressor support, BMI 39, hypermetabolic, debility     Pertinent cultures to date:   Blood cxs collected  BAL to be performed     Recent Labs      17   0248  17   2240   WBC  9.5  24.6*   NEUTSPOLYS  73.90*  86.80*     Recent Labs      17   0248  17   2240   BUN  27*  29*   CREATININE  1.35  1.41*   ALBUMIN   --   3.4     Blood pressure 131/45, pulse 53, temperature 37.6 °C (99.7 °F), resp. rate 27, height 1.638 m (5' 4.5\"), weight 104 kg (229 lb 4.5 oz), SpO2 95 %. Temp (24hrs), Av.6 °C (99.7 °F), Min:37.6 °C (99.7 °F), Max:37.6 °C (99.7 °F)      A/P   1. Vancomycin dose change: initiate pulse dosing - no additional doses ordered   2. Next vancomycin level:  @ 1200 (ordered)   3. Goal trough: 16-20  4. Comments: Patient at high risk for drug accumulation and associated toxicity given her blood pressure, body habitus, " and acuity.  Patient reportedly has a long history of hospital and SNF stays. Dr. Coreas to perform BAL - would recommend de-escalation of antibiotic coverage if MRSA can be ruled out. Given pt's instability, pulse dosing appears to be most appropriate at this time. Will order a 12 hour random level. Daytime clinical pharmacist to follow up and implement further dosing. Pharmacy will continue to monitor and adjust dosing or recommend de-escalation as appropriate.       Valdez Mccabe, KhoiD

## 2017-06-22 NOTE — RESPIRATORY CARE
ABG Results: 7.25 / 51.4 / 69 / 22.6 / -4  Current Vent Settings: 20 x 330 +8 .60  No Vent changes per ABG at this time per DR. Coreas

## 2017-06-22 NOTE — PROGRESS NOTES
Pulmonary Critical Care Progress Note      Date of service: 6/22/2017    Chief Complaint: SOB    History of Present Illness: This is a 66-year-old lady who has a history of chronic hypoxemic respiratory failure and chronic obstructive pulmonary disease.  She was hospitalized at [facility] from on or about May 7th to 05/10/2017, with an ankle fracture.  She was then readmitted to [facility] from on or about June 2nd to 06/09/2017, with ventilator dependent respiratory failure and healthcare-associated pneumonia.  She was subsequently sent to a skilled nursing facility.  She now comes back to the emergency room this evening because of increasing shortness of breath.  She was brought in here with wheezing and coarse crackles.  She was in respiratory distress.  She was intubated.  She is now on full mechanical ventilatory support.  Once again, she has been more residing at a skilled nursing facility.    ROS:  Respiratory: unable to perform due to the patient's inability to effectively communicate, Cardiac: unable to perform due to the patient's inability to effectively communicate, GI: unable to perform due to the patient's inability to effectively communicate.  All other systems negative.    Interval Events:  24 hour interval history reviewed  Tmax 100 °F   -700cc over the last 24 hours via sykes.   CXR shows: slight interval improving interstitial edema, ongoing left basilar atelectasis.   Follows commands, opens eyes, nodding appropriately on Propofol.   SB/SR, BP noted.  Fiber source started per protocol.   No secretions when suctioned.   K replaced per protocol.    PFSH:  No change.    Physical Exam:  General:  Sedate, but follows simple commands to verbal stimuli.   HEENT:  Normo, atraumatic.  CVS:  RRR.  Respiratory:  Diminished, but clear.  Abdomen:  Soft, NT.  Extremities:  Without edema. Walking boot placed on left ankle.   Neuro/Psych:  NFE.      Respiratory:  [place] Vent Mode: APVCMV, Rate (breaths/min): 20, Vt  Target (mL): 330, PEEP/CPAP: 8, FiO2: 60, Static Compliance (ml / cm H2O): 37, Control VTE (exp VT): 516  Pulse Oximetry: 97 %  Chest Tube Drains:        ImagingAvailable data reviewed   Recent Labs      06/22/17   0016  06/22/17   0246  06/22/17   0409   ISTATAPH  7.218*  7.267*  7.253*   ISTATAPCO2  58.0*  49.6*  48.0*   ISTATAPO2  146*  65  76   ISTATATCO2  25  24  23   [id number]  99  89*  92*   ISTATARTHCO3  23.6  22.6  21.2   ISTATARTBE  -4  -4  -6*   ISTATTEMP  see below  100.0 F  99.0 F   ISTATFIO2  100  60  60   ISTATSPEC  Arterial  Arterial  Arterial   ISTATAPHTC   --   7.256*  7.250*   [id number]   --   69  77     HemoDynamics:  Pulse: 60, Heart Rate (Monitored): 60  Blood Pressure : 131/45 mmHg, NIBP: 107/54 mmHg     Imaging: Available data reviewed  Recent Labs      06/21/17   2240   TROPONINI  0.02     Neuro:  GCS Total [place] Coma Score: 10     Imaging: Available data reviewed    Fluids:  Intake/Output       06/20/17 0700 - 06/21/17 0659 (Not Admitted) 06/21/17 0700 - 06/22/17 0659 06/22/17 0700 - 06/23/17 0659      0992-7155 4823-4786 Total 4467-7057 7998-5704 Total 2432-1658 0143-5868 Total       Intake    Total Intake -- -- -- -- -- -- -- -- --       Output    Urine  --  -- --  --  675 675  --  -- --    Indwelling Cathether -- -- -- -- 675 675 -- -- --    Total Output -- -- -- -- 675 675 -- -- --       Net I/O     -- -- -- -- -675 -675 -- -- --        Weight: 104.2 kg (229 lb 11.5 oz) (Weighed with boot on)  Recent Labs      06/20/17   0248 06/21/17 2240   SODIUM  136  132*   POTASSIUM  5.4  6.1*   CHLORIDE  104  102   CO2  26  22   BUN  27*  29*   CREATININE  1.35  1.41*   CALCIUM  8.9  9.0     GI/Nutrition:  Imaging: Available data reviewed  NPO     Liver Function  Recent Labs      06/20/17 0248  06/21/17 2240   ALTSGPT   --   15   ASTSGOT   --   16   ALKPHOSPHAT   --   59   TBILIRUBIN   --   0.8   GLUCOSE  111*  179*     Heme:  Recent Labs      06/20/17 0248 06/21/17 2240   RBC   2.65*  3.03*   HEMOGLOBIN  7.5*  8.6*   HEMATOCRIT  24.9*  29.0*   PLATELETCT  318  461*   PROTHROMBTM   --   14.4   APTT   --   34.3   INR   --   1.09     Infectious Disease:  Temp  Av.7 °C (99.9 °F)  Min: 37.6 °C (99.7 °F)  Max: 37.8 °C (100 °F)  Monitored Temp  Av.3 °C (99.2 °F)  Min: 37 °C (98.6 °F)  Max: 37.8 °C (100 °F)  Micro: cultures reviewed  Recent Labs      17   0248  17   2240   WBC  9.5  24.6*   NEUTSPOLYS  73.90*  86.80*   LYMPHOCYTES  13.20*  4.80*   MONOCYTES  7.50  4.50   EOSINOPHILS  4.00  1.80   BASOPHILS  0.60  0.60   ASTSGOT   --   16   ALTSGPT   --   15   ALKPHOSPHAT   --   59   TBILIRUBIN   --   0.8     Current Facility-Administered Medications   Medication Dose Frequency Provider Last Rate Last Dose   • Respiratory Care per Protocol   Continuous RT Angel Lema M.D.       • senna-docusate (PERICOLACE or SENOKOT S) 8.6-50 MG per tablet 2 Tab  2 Tab BID Angel Lema M.D.        And   • polyethylene glycol/lytes (MIRALAX) PACKET 1 Packet  1 Packet QDAY PRN Angel Lema M.D.        And   • magnesium hydroxide (MILK OF MAGNESIA) suspension 30 mL  30 mL QDAY PRN Angel Lema M.D.        And   • bisacodyl (DULCOLAX) suppository 10 mg  10 mg QDAY PRN Angel Lema M.D.       • chlorhexidine (PERIDEX) 0.12 % solution 15 mL  15 mL BID Angel Lema M.D.       • lidocaine (XYLOCAINE) 1%  injection  1-2 mL Q30 MIN PRN Angel Lema M.D.       • MD ALERT...Adult ICU Electrolyte Replacement per Pharmacy Protocol   pharmacy to dose Angel Lema M.D.       • NS infusion   Continuous Angel Lema M.D. 50 mL/hr at 17 0044     • fentaNYL (SUBLIMAZE) injection 25 mcg  25 mcg Q HOUR PRN Angel Lema M.D.        Or   • fentaNYL (SUBLIMAZE) injection 50 mcg  50 mcg Q HOUR PRN Angel Lema M.D.        Or   • fentaNYL (SUBLIMAZE) injection 100 mcg  100 mcg Q HOUR PRN Angel Hart  JAYCOB Soliz       • fentaNYL (SUBLIMAZE) 50 mcg/mL in 50mL   Continuous Angel Lema M.D. 1 mL/hr at 06/22/17 0139 50 mcg at 06/22/17 0139   • ipratropium-albuterol (DUONEB) nebulizer solution 3 mL  3 mL Q2HRS PRN (RT) Angel Lema M.D.       • ipratropium-albuterol (DUONEB) nebulizer solution 3 mL  3 mL Q4HRS (RT) Angel Lema M.D.   Stopped at 06/22/17 0101   • famotidine (PEPCID) tablet 20 mg  20 mg DAILY Angel Lema M.D.        Or   • famotidine (PEPCID) injection 20 mg  20 mg DAILY Angel Lema M.D.       • insulin lispro (HUMALOG) injection 2-9 Units  2-9 Units Q6HRS Angel Lema M.D.   5 Units at 06/22/17 0550   • glucose 4 g chewable tablet 16 g  16 g Q15 MIN PRN Angel Lema M.D.        And   • dextrose 50% (D50W) injection 25 mL  25 mL Q15 MIN PRN Angel Lema M.D.       • propofol (DIPRIVAN) injection  5-80 mcg/kg/min Continuous Angel Lema M.D. 12.5 mL/hr at 06/22/17 0559 20 mcg/kg/min at 06/22/17 0559   • heparin injection 5,000 Units  5,000 Units Q8HRS Angel Lema M.D.   5,000 Units at 06/22/17 0540   • MD ALERT... vancomycin per pharmacy protocol   pharmacy to dose Angel Lema M.D.       • piperacillin-tazobactam (ZOSYN) 4.5 g in  mL IVPB  4.5 g Q6HRS Angel Lema M.D.   Stopped at 06/22/17 0434   • NS infusion 3,120 mL  30 mL/kg Once PRN Angel Lema M.D.       • NS (BOLUS) infusion 500 mL  500 mL Once PRN Angel Lema M.D.       • norepinephrine (LEVOPHED) 8 mg in  mL Infusion  0.5-30 mcg/min Continuous Angel Lema M.D. 18.8 mL/hr at 06/22/17 0115 10 mcg/min at 06/22/17 0115    And   • vasopressin (VASOSTRICT) 20 Units in  mL Infusion  0.03 Units/min Continuous Angel Lema M.D.   Stopped at 06/22/17 0115   • levothyroxine (SYNTHROID) tablet 75 mcg  75 mcg AM KYREE Richards M.D.   75 mcg at 06/22/17 0509   •  montelukast (SINGULAIR) tablet 10 mg  10 mg DAILY Carito Richards M.D.       • acetaminophen (TYLENOL) tablet 650 mg  650 mg Q6HRS PRN Carito Richards M.D.       • methylPREDNISolone sod succ (SOLU-MEDROL) 125 MG injection 62.5 mg  62.5 mg Q6HRS Carito Richards M.D.   62.5 mg at 06/22/17 0540   • PROPOFOL 10 MG/ML IV EMUL             Last reviewed on 6/5/2017 11:55 AM by Fermin Michael Ass't    Quality  Measures:  Labs reviewed, Medications reviewed and Radiology images reviewed                    Lines:  Right-sided PICC    Gtts:  Fentanyl 50  Levo 10  NS 50  Propofol 20    Abx:  Day 2 Vancomycin  Day 2 Zosyn    Solumedrol 62.5 q6    Rcx 6/19 negative    Assessment/Plan  -  Ventilator-dependent respiratory failure.   - rt/02 protocol   - full vent support   - daily sat/sbt   - steroids/bds   - empiric abx   - f/u cultures  -  Acute exacerbation of chronic obstructive pulmonary disease.   - bds, steriods   - empiric abx  -  Severe sepsis, pulmonary source.   - sepsis protocol   - levo to maintain map >65   - goal cvp 10-12   - procalcitonin 2.9   - f/u cultures and continue abx  -  Health-care-associated pneumonia.   - as above    - on vanco/zosyn  -  Possible coexisting acute pulmonary edema secondary to acute on chronic    diastolic heart failure.   - monitor volume balance   - continue resuscitation while on pressors and low map  -  Hyperglycemia - IDDM + steroids   - start lantus (home dose 22u)   - ssi   -  Pulmonary HTN w RVSP of 60    -  Obstructive sleep apnea.  -  Gastroesophageal reflux disease.  -  Hepatitis C.  -  History of systemic arterial hypertension.  -  Diabetes mellitus type 2.  -  Chronic kidney disease.  -  History of atrial fibrillation.  -  Fibromyalgia.  -  Post-traumatic stress disorder.  -  Anemia.  -  Hyponatremia.  -  Hyperkalemia.  -  Recent ankle fracture in May of this year.    This is in addition to Dr Lema note earlier today    Discussed patient  condition and risk of morbidity and/or mortality with RN, RT, Therapies, Pharmacy, Charge nurse / hot rounds and QA team.    The patient remains critically ill.  Critical care time = 48 minutes in directly providing and coordinating critical care and extensive data review.  No time overlap and excludes procedures.    Lizzie CLAY (Scribe), am scribing for, and in the presence of, William Hope M.D..    Electronically signed by: Lizzie Chambers (Alla), 6/22/2017    William CLAY M.D. personally performed the services described in this documentation, as scribed by Lizzie Chambers in my presence, and it is both accurate and complete.

## 2017-06-22 NOTE — PROGRESS NOTES
"Pharmacy Kinetics 66 y.o. female on vancomycin day # 1  2017    Currently on Vancomycin Pulse Dosing d/t Concerns for Accumulation   : Vanco load 2600 mg IV at 0043    Indication for Treatment: Empiric - HCAP    Pertinent history per medical record: Admitted from [facility] on 2017 for respiratory failure. Patient developed increasing SOB per staff at [facility], found to be hypoxic on EMS arrival. Patient required intubation in ED, noted to have leukocytosis and opacities present on CXR, thus empiric antibiotics initiated for possible HCAP. Of note, patient also hospitalized in May with pneumonia requiring intubation at that time as well.     Other antibiotics: piperacillin/tazobactam 4.5 gm IV q6hrs    Allergies: Vitamin c; Keflex; Codeine; Lyrica; and Sudafed     Concerns for renal function include age, BUN/SCr ratio > 20:1 with SCr increasing since admission, grade II diastolic dysfunction, obesity (BMI ~39) & hypotension requiring vasopressor support.    Pertinent cultures to date:   : BW - in process  : urine cx - in process  : peripheral blood cx X2 - in process    Imagin/21: CXR - Mid and lower zone opacities, most consistent with pulmonary edema, overall worse compared with 2017.    Recent Labs      17   0248  170  17   1150   WBC  9.5  24.6*  15.6*   NEUTSPOLYS  73.90*  86.80*  97.10*     Recent Labs      17   0248  17   2240  17   1150   BUN  27*  29*  34*   CREATININE  1.35  1.41*  1.56*   ALBUMIN   --   3.4   --      Recent Labs      17   1150   VANCORANDOM  32.4     Intake/Output Summary (Last 24 hours) at 17 1304  Last data filed at 17 1200   Gross per 24 hour   Intake 553.69 ml   Output   1425 ml   Net -871.31 ml      Blood pressure 131/45, pulse 61, temperature 37.8 °C (100 °F), resp. rate 20, height 1.638 m (5' 4.49\"), weight 104.2 kg (229 lb 11.5 oz), SpO2 99 %. Temp (24hrs), Av.7 °C (99.9 °F), Min:37.6 °C " (99.7 °F), Max:37.8 °C (100 °F)      A/P   1. Vancomycin dose change: No dose indicated  2. Next vancomycin level: 6/23 at 0500  3. Goal trough: 16-20 mcg/mL  4. Comments: Patient remains on full ventilatory support, requiring moderate dose vasopressor support for her hypotension. Patient's renal indices increased following admit, adequate UOP recorded for the past 12 hours. Blood, urine and respiratory cultures in process. Patient's ~12 hour vanco level significantly supratherapeutic, no repeat dose indicated at this time. Will repeat random vanco level in AM to guide further dosing. Will continue to follow cultures and recommend de-escalation of antibiotics if continued MRSA coverage is no longer indicated.    Pharmacy will continue to follow.     Asiya Villavicencio, KhoiD

## 2017-06-22 NOTE — ED NOTES
Pt BIB EMS from [facility].  Pt c/o SOB starting one hour ago.  On EMS arrival pt was 67% on 15 liter mask.  1 albuterol treatment and 2 duo nebs given PTA.  EMS was assisting pt with breathing with ambu bag on arrival.  ERP called to room.  RT at bedside.  Pt intubated.  Sepsis score 3.

## 2017-06-23 ENCOUNTER — APPOINTMENT (OUTPATIENT)
Dept: RADIOLOGY | Facility: MEDICAL CENTER | Age: 67
DRG: 870 | End: 2017-06-23
Attending: INTERNAL MEDICINE
Payer: MEDICARE

## 2017-06-23 LAB
ABO GROUP BLD: NORMAL
ANION GAP SERPL CALC-SCNC: 9 MMOL/L (ref 0–11.9)
BARCODED ABORH UBTYP: 9500
BARCODED PRD CODE UBPRD: NORMAL
BARCODED UNIT NUM UBUNT: NORMAL
BASE EXCESS BLDA CALC-SCNC: -1 MMOL/L (ref -4–3)
BASOPHILS # BLD AUTO: 0.1 % (ref 0–1.8)
BASOPHILS # BLD AUTO: 0.1 % (ref 0–1.8)
BASOPHILS # BLD: 0.01 K/UL (ref 0–0.12)
BASOPHILS # BLD: 0.01 K/UL (ref 0–0.12)
BLD GP AB SCN SERPL QL: NORMAL
BODY TEMPERATURE: ABNORMAL DEGREES
BUN SERPL-MCNC: 34 MG/DL (ref 8–22)
CALCIUM SERPL-MCNC: 8.5 MG/DL (ref 8.5–10.5)
CHLORIDE SERPL-SCNC: 109 MMOL/L (ref 96–112)
CO2 BLDA-SCNC: 26 MMOL/L (ref 20–33)
CO2 SERPL-SCNC: 23 MMOL/L (ref 20–33)
COMPONENT R 8504R: NORMAL
CREAT SERPL-MCNC: 1.23 MG/DL (ref 0.5–1.4)
EOSINOPHIL # BLD AUTO: 0 K/UL (ref 0–0.51)
EOSINOPHIL # BLD AUTO: 0 K/UL (ref 0–0.51)
EOSINOPHIL NFR BLD: 0 % (ref 0–6.9)
EOSINOPHIL NFR BLD: 0 % (ref 0–6.9)
ERYTHROCYTE [DISTWIDTH] IN BLOOD BY AUTOMATED COUNT: 56.8 FL (ref 35.9–50)
ERYTHROCYTE [DISTWIDTH] IN BLOOD BY AUTOMATED COUNT: 57.1 FL (ref 35.9–50)
GFR SERPL CREATININE-BSD FRML MDRD: 43 ML/MIN/1.73 M 2
GLUCOSE BLD-MCNC: 265 MG/DL (ref 65–99)
GLUCOSE BLD-MCNC: 266 MG/DL (ref 65–99)
GLUCOSE BLD-MCNC: 289 MG/DL (ref 65–99)
GLUCOSE BLD-MCNC: 300 MG/DL (ref 65–99)
GLUCOSE SERPL-MCNC: 269 MG/DL (ref 65–99)
HCO3 BLDA-SCNC: 24.3 MMOL/L (ref 17–25)
HCT VFR BLD AUTO: 22.3 % (ref 37–47)
HCT VFR BLD AUTO: 26 % (ref 37–47)
HGB BLD-MCNC: 6.7 G/DL (ref 12–16)
HGB BLD-MCNC: 8.1 G/DL (ref 12–16)
IMM GRANULOCYTES # BLD AUTO: 0.14 K/UL (ref 0–0.11)
IMM GRANULOCYTES # BLD AUTO: 0.28 K/UL (ref 0–0.11)
IMM GRANULOCYTES NFR BLD AUTO: 0.9 % (ref 0–0.9)
IMM GRANULOCYTES NFR BLD AUTO: 1.7 % (ref 0–0.9)
LYMPHOCYTES # BLD AUTO: 0.19 K/UL (ref 1–4.8)
LYMPHOCYTES # BLD AUTO: 0.23 K/UL (ref 1–4.8)
LYMPHOCYTES NFR BLD: 1.2 % (ref 22–41)
LYMPHOCYTES NFR BLD: 1.5 % (ref 22–41)
MAGNESIUM SERPL-MCNC: 2.1 MG/DL (ref 1.5–2.5)
MCH RBC QN AUTO: 27.9 PG (ref 27–33)
MCH RBC QN AUTO: 28.5 PG (ref 27–33)
MCHC RBC AUTO-ENTMCNC: 30 G/DL (ref 33.6–35)
MCHC RBC AUTO-ENTMCNC: 31.2 G/DL (ref 33.6–35)
MCV RBC AUTO: 91.5 FL (ref 81.4–97.8)
MCV RBC AUTO: 92.9 FL (ref 81.4–97.8)
MONOCYTES # BLD AUTO: 0.28 K/UL (ref 0–0.85)
MONOCYTES # BLD AUTO: 0.3 K/UL (ref 0–0.85)
MONOCYTES NFR BLD AUTO: 1.7 % (ref 0–13.4)
MONOCYTES NFR BLD AUTO: 2 % (ref 0–13.4)
NEUTROPHILS # BLD AUTO: 14.7 K/UL (ref 2–7.15)
NEUTROPHILS # BLD AUTO: 15.67 K/UL (ref 2–7.15)
NEUTROPHILS NFR BLD: 95.3 % (ref 44–72)
NEUTROPHILS NFR BLD: 95.5 % (ref 44–72)
NRBC # BLD AUTO: 0 K/UL
NRBC # BLD AUTO: 0.03 K/UL
NRBC BLD AUTO-RTO: 0 /100 WBC
NRBC BLD AUTO-RTO: 0.2 /100 WBC
O2/TOTAL GAS SETTING VFR VENT: 35 %
PCO2 BLDA: 44 MMHG (ref 26–37)
PCO2 TEMP ADJ BLDA: 42.5 MMHG (ref 26–37)
PH BLDA: 7.35 [PH] (ref 7.4–7.5)
PH TEMP ADJ BLDA: 7.36 [PH] (ref 7.4–7.5)
PHOSPHATE SERPL-MCNC: 3.7 MG/DL (ref 2.5–4.5)
PLATELET # BLD AUTO: 189 K/UL (ref 164–446)
PLATELET # BLD AUTO: 209 K/UL (ref 164–446)
PMV BLD AUTO: 9.2 FL (ref 9–12.9)
PMV BLD AUTO: 9.6 FL (ref 9–12.9)
PO2 BLDA: 79 MMHG (ref 64–87)
PO2 TEMP ADJ BLDA: 75 MMHG (ref 64–87)
POTASSIUM SERPL-SCNC: 4 MMOL/L (ref 3.6–5.5)
PRODUCT TYPE UPROD: NORMAL
RBC # BLD AUTO: 2.4 M/UL (ref 4.2–5.4)
RBC # BLD AUTO: 2.84 M/UL (ref 4.2–5.4)
RH BLD: NORMAL
SAO2 % BLDA: 95 % (ref 93–99)
SODIUM SERPL-SCNC: 141 MMOL/L (ref 135–145)
SPECIMEN DRAWN FROM PATIENT: ABNORMAL
TRIGL SERPL-MCNC: 104 MG/DL (ref 0–149)
UNIT STATUS USTAT: NORMAL
VANCOMYCIN SERPL-MCNC: 17.2 UG/ML
WBC # BLD AUTO: 15.4 K/UL (ref 4.8–10.8)
WBC # BLD AUTO: 16.4 K/UL (ref 4.8–10.8)

## 2017-06-23 PROCEDURE — A9270 NON-COVERED ITEM OR SERVICE: HCPCS | Performed by: INTERNAL MEDICINE

## 2017-06-23 PROCEDURE — 83735 ASSAY OF MAGNESIUM: CPT

## 2017-06-23 PROCEDURE — 700102 HCHG RX REV CODE 250 W/ 637 OVERRIDE(OP): Performed by: INTERNAL MEDICINE

## 2017-06-23 PROCEDURE — 700105 HCHG RX REV CODE 258

## 2017-06-23 PROCEDURE — 700101 HCHG RX REV CODE 250: Performed by: INTERNAL MEDICINE

## 2017-06-23 PROCEDURE — 86850 RBC ANTIBODY SCREEN: CPT

## 2017-06-23 PROCEDURE — 80202 ASSAY OF VANCOMYCIN: CPT

## 2017-06-23 PROCEDURE — 700111 HCHG RX REV CODE 636 W/ 250 OVERRIDE (IP)

## 2017-06-23 PROCEDURE — 86901 BLOOD TYPING SEROLOGIC RH(D): CPT

## 2017-06-23 PROCEDURE — 700111 HCHG RX REV CODE 636 W/ 250 OVERRIDE (IP): Performed by: INTERNAL MEDICINE

## 2017-06-23 PROCEDURE — 94150 VITAL CAPACITY TEST: CPT

## 2017-06-23 PROCEDURE — 700105 HCHG RX REV CODE 258: Performed by: INTERNAL MEDICINE

## 2017-06-23 PROCEDURE — 36600 WITHDRAWAL OF ARTERIAL BLOOD: CPT

## 2017-06-23 PROCEDURE — P9016 RBC LEUKOCYTES REDUCED: HCPCS

## 2017-06-23 PROCEDURE — 86923 COMPATIBILITY TEST ELECTRIC: CPT

## 2017-06-23 PROCEDURE — 84478 ASSAY OF TRIGLYCERIDES: CPT

## 2017-06-23 PROCEDURE — 80048 BASIC METABOLIC PNL TOTAL CA: CPT

## 2017-06-23 PROCEDURE — 71010 DX-CHEST-PORTABLE (1 VIEW): CPT

## 2017-06-23 PROCEDURE — 86900 BLOOD TYPING SEROLOGIC ABO: CPT

## 2017-06-23 PROCEDURE — 94003 VENT MGMT INPAT SUBQ DAY: CPT

## 2017-06-23 PROCEDURE — 770022 HCHG ROOM/CARE - ICU (200)

## 2017-06-23 PROCEDURE — 82962 GLUCOSE BLOOD TEST: CPT | Mod: 91

## 2017-06-23 PROCEDURE — 99291 CRITICAL CARE FIRST HOUR: CPT | Performed by: INTERNAL MEDICINE

## 2017-06-23 PROCEDURE — 36430 TRANSFUSION BLD/BLD COMPNT: CPT

## 2017-06-23 PROCEDURE — 85025 COMPLETE CBC W/AUTO DIFF WBC: CPT

## 2017-06-23 PROCEDURE — 30233N1 TRANSFUSION OF NONAUTOLOGOUS RED BLOOD CELLS INTO PERIPHERAL VEIN, PERCUTANEOUS APPROACH: ICD-10-PCS | Performed by: INTERNAL MEDICINE

## 2017-06-23 PROCEDURE — 84100 ASSAY OF PHOSPHORUS: CPT

## 2017-06-23 PROCEDURE — 82803 BLOOD GASES ANY COMBINATION: CPT

## 2017-06-23 RX ORDER — HYDROXYZINE 50 MG/1
50 TABLET, FILM COATED ORAL 3 TIMES DAILY PRN
Status: DISCONTINUED | OUTPATIENT
Start: 2017-06-23 | End: 2017-07-03 | Stop reason: HOSPADM

## 2017-06-23 RX ORDER — FUROSEMIDE 10 MG/ML
20 INJECTION INTRAMUSCULAR; INTRAVENOUS ONCE
Status: COMPLETED | OUTPATIENT
Start: 2017-06-23 | End: 2017-06-23

## 2017-06-23 RX ORDER — HYDRALAZINE HYDROCHLORIDE 20 MG/ML
10 INJECTION INTRAMUSCULAR; INTRAVENOUS EVERY 4 HOURS PRN
Status: DISCONTINUED | OUTPATIENT
Start: 2017-06-23 | End: 2017-07-03 | Stop reason: HOSPADM

## 2017-06-23 RX ORDER — INSULIN GLARGINE 100 [IU]/ML
25 INJECTION, SOLUTION SUBCUTANEOUS
Status: DISCONTINUED | OUTPATIENT
Start: 2017-06-24 | End: 2017-06-27

## 2017-06-23 RX ORDER — INSULIN GLARGINE 100 [IU]/ML
10 INJECTION, SOLUTION SUBCUTANEOUS ONCE
Status: COMPLETED | OUTPATIENT
Start: 2017-06-23 | End: 2017-06-23

## 2017-06-23 RX ORDER — LORAZEPAM 2 MG/ML
1 INJECTION INTRAMUSCULAR PRN
Status: DISCONTINUED | OUTPATIENT
Start: 2017-06-23 | End: 2017-06-25

## 2017-06-23 RX ORDER — HYDRALAZINE HYDROCHLORIDE 20 MG/ML
20 INJECTION INTRAMUSCULAR; INTRAVENOUS EVERY 4 HOURS PRN
Status: DISCONTINUED | OUTPATIENT
Start: 2017-06-23 | End: 2017-06-23

## 2017-06-23 RX ADMIN — INSULIN LISPRO 5 UNITS: 100 INJECTION, SOLUTION INTRAVENOUS; SUBCUTANEOUS at 12:15

## 2017-06-23 RX ADMIN — DEXMEDETOMIDINE HYDROCHLORIDE 0.7 MCG/KG/HR: 100 INJECTION, SOLUTION INTRAVENOUS at 18:14

## 2017-06-23 RX ADMIN — PIPERACILLIN SODIUM AND TAZOBACTAM SODIUM 4.5 G: 4; .5 INJECTION, POWDER, FOR SOLUTION INTRAVENOUS at 17:18

## 2017-06-23 RX ADMIN — IPRATROPIUM BROMIDE AND ALBUTEROL SULFATE 3 ML: .5; 3 SOLUTION RESPIRATORY (INHALATION) at 18:54

## 2017-06-23 RX ADMIN — FENTANYL CITRATE 100 MCG: 50 INJECTION, SOLUTION INTRAMUSCULAR; INTRAVENOUS at 13:01

## 2017-06-23 RX ADMIN — FENTANYL CITRATE 50 MCG: 50 INJECTION, SOLUTION INTRAMUSCULAR; INTRAVENOUS at 07:50

## 2017-06-23 RX ADMIN — FENTANYL CITRATE 100 MCG: 50 INJECTION, SOLUTION INTRAMUSCULAR; INTRAVENOUS at 16:40

## 2017-06-23 RX ADMIN — FAMOTIDINE 20 MG: 20 TABLET, FILM COATED ORAL at 08:03

## 2017-06-23 RX ADMIN — METHYLPREDNISOLONE SODIUM SUCCINATE 62.5 MG: 125 INJECTION, POWDER, FOR SOLUTION INTRAMUSCULAR; INTRAVENOUS at 17:17

## 2017-06-23 RX ADMIN — HEPARIN SODIUM 5000 UNITS: 5000 INJECTION, SOLUTION INTRAVENOUS; SUBCUTANEOUS at 21:08

## 2017-06-23 RX ADMIN — FENTANYL CITRATE 50 MCG: 50 INJECTION, SOLUTION INTRAMUSCULAR; INTRAVENOUS at 00:01

## 2017-06-23 RX ADMIN — METHYLPREDNISOLONE SODIUM SUCCINATE 62.5 MG: 125 INJECTION, POWDER, FOR SOLUTION INTRAMUSCULAR; INTRAVENOUS at 06:28

## 2017-06-23 RX ADMIN — INSULIN GLARGINE 10 UNITS: 100 INJECTION, SOLUTION SUBCUTANEOUS at 10:32

## 2017-06-23 RX ADMIN — DEXMEDETOMIDINE HYDROCHLORIDE 0.2 MCG/KG/HR: 100 INJECTION, SOLUTION INTRAVENOUS at 10:04

## 2017-06-23 RX ADMIN — DEXMEDETOMIDINE HYDROCHLORIDE 0.7 MCG/KG/HR: 100 INJECTION, SOLUTION INTRAVENOUS at 21:09

## 2017-06-23 RX ADMIN — FENTANYL CITRATE 100 MCG: 50 INJECTION, SOLUTION INTRAMUSCULAR; INTRAVENOUS at 02:42

## 2017-06-23 RX ADMIN — VANCOMYCIN HYDROCHLORIDE 2000 MG: 100 INJECTION, POWDER, LYOPHILIZED, FOR SOLUTION INTRAVENOUS at 11:00

## 2017-06-23 RX ADMIN — IPRATROPIUM BROMIDE AND ALBUTEROL SULFATE 3 ML: .5; 3 SOLUTION RESPIRATORY (INHALATION) at 10:57

## 2017-06-23 RX ADMIN — HEPARIN SODIUM 5000 UNITS: 5000 INJECTION, SOLUTION INTRAVENOUS; SUBCUTANEOUS at 13:01

## 2017-06-23 RX ADMIN — PIPERACILLIN SODIUM AND TAZOBACTAM SODIUM 4.5 G: 4; .5 INJECTION, POWDER, FOR SOLUTION INTRAVENOUS at 11:51

## 2017-06-23 RX ADMIN — PROPOFOL 60 MCG/KG/MIN: 10 INJECTION, EMULSION INTRAVENOUS at 09:30

## 2017-06-23 RX ADMIN — PROPOFOL 40 MCG/KG/MIN: 10 INJECTION, EMULSION INTRAVENOUS at 02:17

## 2017-06-23 RX ADMIN — PROPOFOL 80 MCG/KG/MIN: 10 INJECTION, EMULSION INTRAVENOUS at 23:39

## 2017-06-23 RX ADMIN — LIDOCAINE HYDROCHLORIDE 2 ML: 10 INJECTION, SOLUTION INFILTRATION; PERINEURAL at 23:43

## 2017-06-23 RX ADMIN — FENTANYL CITRATE 100 MCG: 50 INJECTION, SOLUTION INTRAMUSCULAR; INTRAVENOUS at 04:59

## 2017-06-23 RX ADMIN — FUROSEMIDE 20 MG: 10 INJECTION, SOLUTION INTRAMUSCULAR; INTRAVENOUS at 07:50

## 2017-06-23 RX ADMIN — STANDARDIZED SENNA CONCENTRATE AND DOCUSATE SODIUM 2 TABLET: 8.6; 5 TABLET, FILM COATED ORAL at 08:03

## 2017-06-23 RX ADMIN — IPRATROPIUM BROMIDE AND ALBUTEROL SULFATE 3 ML: .5; 3 SOLUTION RESPIRATORY (INHALATION) at 14:51

## 2017-06-23 RX ADMIN — HEPARIN SODIUM 5000 UNITS: 5000 INJECTION, SOLUTION INTRAVENOUS; SUBCUTANEOUS at 06:18

## 2017-06-23 RX ADMIN — PIPERACILLIN SODIUM AND TAZOBACTAM SODIUM 4.5 G: 4; .5 INJECTION, POWDER, FOR SOLUTION INTRAVENOUS at 06:19

## 2017-06-23 RX ADMIN — PROPOFOL 30 MCG/KG/MIN: 10 INJECTION, EMULSION INTRAVENOUS at 06:27

## 2017-06-23 RX ADMIN — CHLORHEXIDINE GLUCONATE 15 ML: 1.2 RINSE ORAL at 21:07

## 2017-06-23 RX ADMIN — FENTANYL CITRATE 100 MCG: 50 INJECTION, SOLUTION INTRAMUSCULAR; INTRAVENOUS at 10:28

## 2017-06-23 RX ADMIN — IPRATROPIUM BROMIDE AND ALBUTEROL SULFATE 3 ML: .5; 3 SOLUTION RESPIRATORY (INHALATION) at 07:00

## 2017-06-23 RX ADMIN — DEXMEDETOMIDINE HYDROCHLORIDE 0.7 MCG/KG/HR: 100 INJECTION, SOLUTION INTRAVENOUS at 13:00

## 2017-06-23 RX ADMIN — INSULIN LISPRO 5 UNITS: 100 INJECTION, SOLUTION INTRAVENOUS; SUBCUTANEOUS at 06:35

## 2017-06-23 RX ADMIN — LEVOTHYROXINE SODIUM 75 MCG: 75 TABLET ORAL at 06:17

## 2017-06-23 RX ADMIN — INSULIN LISPRO 5 UNITS: 100 INJECTION, SOLUTION INTRAVENOUS; SUBCUTANEOUS at 18:18

## 2017-06-23 RX ADMIN — HYDRALAZINE HYDROCHLORIDE 20 MG: 20 INJECTION INTRAMUSCULAR; INTRAVENOUS at 23:15

## 2017-06-23 RX ADMIN — DEXMEDETOMIDINE HYDROCHLORIDE 0.7 MCG/KG/HR: 100 INJECTION, SOLUTION INTRAVENOUS at 15:30

## 2017-06-23 RX ADMIN — FENTANYL CITRATE 100 MCG: 50 INJECTION, SOLUTION INTRAMUSCULAR; INTRAVENOUS at 23:28

## 2017-06-23 RX ADMIN — INSULIN GLARGINE 15 UNITS: 100 INJECTION, SOLUTION SUBCUTANEOUS at 06:34

## 2017-06-23 RX ADMIN — MONTELUKAST SODIUM 10 MG: 10 TABLET, FILM COATED ORAL at 08:03

## 2017-06-23 RX ADMIN — METHYLPREDNISOLONE SODIUM SUCCINATE 62.5 MG: 125 INJECTION, POWDER, FOR SOLUTION INTRAMUSCULAR; INTRAVENOUS at 00:00

## 2017-06-23 RX ADMIN — FENTANYL CITRATE 100 MCG: 50 INJECTION, SOLUTION INTRAMUSCULAR; INTRAVENOUS at 21:08

## 2017-06-23 RX ADMIN — CHLORHEXIDINE GLUCONATE 15 ML: 1.2 RINSE ORAL at 08:04

## 2017-06-23 RX ADMIN — METHYLPREDNISOLONE SODIUM SUCCINATE 62.5 MG: 125 INJECTION, POWDER, FOR SOLUTION INTRAMUSCULAR; INTRAVENOUS at 11:51

## 2017-06-23 ASSESSMENT — LIFESTYLE VARIABLES
DO YOU DRINK ALCOHOL: NO
DO YOU DRINK ALCOHOL: NO

## 2017-06-23 ASSESSMENT — PULMONARY FUNCTION TESTS
FVC: 1.1
FVC: .85

## 2017-06-23 NOTE — CARE PLAN
Problem: Nutritional:  Goal: Nutrition support tolerated and meeting greater than 85% of estimated needs  Outcome: PROGRESSING AS EXPECTED  TF at goal while on propofol

## 2017-06-23 NOTE — FLOWSHEET NOTE
06/23/17 0813   Events/Summary/Plan   Events/Summary/Plan SBT ended. Weaning parameters obtained. PT placed back on full support at previous settings.   Weaning Parameters   RR (bpm) 22   #FVC / Vital Capacity (liters)  0.85   NIF (cm H2O)  -41   Rapid Shallow Breathing Index (RR/VT) 44   Spontaneous VE 11   Spontaneous    Weaning Trial   Weaning Trial Stopped due to: Pt weaned for 1 hour and returned to rest settings per protocol   Length of Weaning Trial (Hours) 1   Vent Weaning Smart Text completed? Yes

## 2017-06-23 NOTE — PROGRESS NOTES
Pt's hemoglobin down to 6.7, Dr. Richards notified, orders to transfuse1 unit RBC received and read back.

## 2017-06-23 NOTE — PROGRESS NOTES
[facility] Progress Note               Author:  Date & Time created: 6/23/2017  4:32 PM     Interval History:  06/22/2017: Remains on vent, starting tube feeds.  Bronch'd today with tan secretions.  Cultures NGTD.  Continue current ABX.  Hyperkalemia resolved.  Renal function stable.  Start lantus 15u for elevated sugars.      06/23/17 : No significant overnight events. She is on Vent support /20/8/35% FiO2   Her ABG today 7.35/44/79/24.3 getting better. On Propofol , Pressors and TF @45  Recieving Abx Zosyn. Her HBG was 6.7 in AM , 1U PRBC given. Will recheck at 4.30 PM .  Plan for today - SBT trials, Mobilization and plans of Extubation if she tolerates SBT.      Disposition: ICU.      Review of Systems:  Review of Systems   Unable to perform ROS: intubated       Physical Exam  Constitutional: She is well-developed, well-nourished, intubated, non-diaphoretic.   HENT:    Head: Normocephalic and atraumatic.    Eyes: Pupils are equal, round, and reactive to light.   Neck: Neck supple. No JVD present. No tracheal deviation present. No thyromegaly present.   Cardiovascular: Normal rate, regular rhythm and normal heart sounds.  Exam reveals no gallop and no friction rub.     No murmur heard.  Pulmonary/Chest: She has wheezes. She has no rales.   Musculoskeletal: She exhibits edema.   Lymphadenopathy:     She has no cervical adenopathy.   Neurological: Sedated  Skin: Skin is warm and dry. She is not diaphoretic.     Labs:  Recent Results (from the past 24 hour(s))   ACCU-CHEK GLUCOSE    Collection Time: 06/22/17  5:01 PM   Result Value Ref Range    Glucose - Accu-Ck 362 (H) 65 - 99 mg/dL   ACCU-CHEK GLUCOSE    Collection Time: 06/22/17 11:59 PM   Result Value Ref Range    Glucose - Accu-Ck 289 (H) 65 - 99 mg/dL   ISTAT ARTERIAL BLOOD GAS    Collection Time: 06/23/17  4:17 AM   Result Value Ref Range    Ph 7.350 (L) 7.400 - 7.500    Pco2 44.0 (H) 26.0 - 37.0 mmHg    Po2 79 64 - 87 mmHg    Tco2 26 20 - 33  mmol/L    S02 95 93 - 99 %    Hco3 24.3 17.0 - 25.0 mmol/L    BE -1 -4 - 3 mmol/L    Body Temp 97.2 F degrees    O2 Therapy 35 %    Ph Temp Mariola 7.362 (L) 7.400 - 7.500    Pco2 Temp Co 42.5 (H) 26.0 - 37.0 mmHg    Po2 Temp Cor 75 64 - 87 mmHg    Specimen Arterial    CBC with Differential    Collection Time: 06/23/17  5:00 AM   Result Value Ref Range    WBC 15.4 (H) 4.8 - 10.8 K/uL    RBC 2.40 (L) 4.20 - 5.40 M/uL    Hemoglobin 6.7 (L) 12.0 - 16.0 g/dL    Hematocrit 22.3 (L) 37.0 - 47.0 %    MCV 92.9 81.4 - 97.8 fL    MCH 27.9 27.0 - 33.0 pg    MCHC 30.0 (L) 33.6 - 35.0 g/dL    RDW 56.8 (H) 35.9 - 50.0 fL    Platelet Count 189 164 - 446 K/uL    MPV 9.6 9.0 - 12.9 fL    Neutrophils-Polys 95.50 (H) 44.00 - 72.00 %    Lymphocytes 1.50 (L) 22.00 - 41.00 %    Monocytes 2.00 0.00 - 13.40 %    Eosinophils 0.00 0.00 - 6.90 %    Basophils 0.10 0.00 - 1.80 %    Immature Granulocytes 0.90 0.00 - 0.90 %    Nucleated RBC 0.00 /100 WBC    Neutrophils (Absolute) 14.70 (H) 2.00 - 7.15 K/uL    Lymphs (Absolute) 0.23 (L) 1.00 - 4.80 K/uL    Monos (Absolute) 0.30 0.00 - 0.85 K/uL    Eos (Absolute) 0.00 0.00 - 0.51 K/uL    Baso (Absolute) 0.01 0.00 - 0.12 K/uL    Immature Granulocytes (abs) 0.14 (H) 0.00 - 0.11 K/uL    NRBC (Absolute) 0.00 K/uL   Basic Metabolic Panel (BMP)    Collection Time: 06/23/17  5:02 AM   Result Value Ref Range    Sodium 141 135 - 145 mmol/L    Potassium 4.0 3.6 - 5.5 mmol/L    Chloride 109 96 - 112 mmol/L    Co2 23 20 - 33 mmol/L    Glucose 269 (H) 65 - 99 mg/dL    Bun 34 (H) 8 - 22 mg/dL    Creatinine 1.23 0.50 - 1.40 mg/dL    Calcium 8.5 8.5 - 10.5 mg/dL    Anion Gap 9.0 0.0 - 11.9   Magnesium    Collection Time: 06/23/17  5:02 AM   Result Value Ref Range    Magnesium 2.1 1.5 - 2.5 mg/dL   Phosphorus    Collection Time: 06/23/17  5:02 AM   Result Value Ref Range    Phosphorus 3.7 2.5 - 4.5 mg/dL   VANCOMYCIN TIMED (RANDOM) LEVEL    Collection Time: 06/23/17  5:02 AM   Result Value Ref Range    Vancomycin  Unknown Level 17.2 ug/mL   TRIGLYCERIDE    Collection Time: 17  5:02 AM   Result Value Ref Range    Triglycerides 104 0 - 149 mg/dL   ESTIMATED GFR    Collection Time: 17  5:02 AM   Result Value Ref Range    GFR If  53 (A) >60 mL/min/1.73 m 2    GFR If Non  43 (A) >60 mL/min/1.73 m 2   ACCU-CHEK GLUCOSE    Collection Time: 17  6:34 AM   Result Value Ref Range    Glucose - Accu-Ck 266 (H) 65 - 99 mg/dL   COD (ADULT)    Collection Time: 17  6:39 AM   Result Value Ref Range    ABO Grouping Only O     Rh Grouping Only POS     Antibody Screen-Cod NEG     Component R       R66                 Red Blood Cells6    [id number]   issued       17   08:12      Product Type Red Blood Cells  LR Pheresis     Dispense Status Issued     Unit Number (Barcoded) [id number]     Product Code (Barcoded) N7631B32     Blood Type (Barcoded) 9500    ACCU-CHEK GLUCOSE    Collection Time: 17 11:56 AM   Result Value Ref Range    Glucose - Accu-Ck 265 (H) 65 - 99 mg/dL       Hemodynamics:  Temp (24hrs), Av.7 °C (98.1 °F), Min:36.7 °C (98.1 °F), Max:36.7 °C (98.1 °F)  Temperature: 36.7 °C (98.1 °F), Monitored Temp: 36.7 °C (98.1 °F)  Pulse  Av.5  Min: 52  Max: 91Heart Rate (Monitored): 70  Blood Pressure : 146/63 mmHg, NIBP: 137/60 mmHg  CVP (mm Hg): (!) 16 MM HG  Respiratory:  Ballard Vent Mode: APVCMV, Rate (breaths/min): 20, PEEP/CPAP: 8, FiO2: 40, P Peak (PIP): 17, P MEAN: 11 Respiration: 20, Pulse Oximetry: 97 %     Work Of Breathing / Effort: Vented  RUL Breath Sounds: Clear, RML Breath Sounds: Diminished, RLL Breath Sounds: Diminished, BIRD Breath Sounds: Clear, LLL Breath Sounds: Diminished  Fluids:    Intake/Output Summary (Last 24 hours) at 17 1743  Last data filed at 17 1400   Gross per 24 hour   Intake 643.69 ml   Output   1600 ml   Net -956.31 ml        GI/Nutrition:  Orders Placed This Encounter   Procedures   • Diet NPO     Standing Status:  Standing      Number of Occurrences: 1      Standing Expiration Date:      Order Specific Question:  Restrict to:     Answer:  With Tube Feed [4]     Medications:  Current Facility-Administered Medications   Medication Last Dose   • [START ON 6/24/2017] insulin glargine (LANTUS) injection 25 Units     • dexmedetomidine (PRECEDEX) 200 mcg in NS 50 mL infusion 0.7 mcg/kg/hr at 06/23/17 1300   • vancomycin 2,000 mg in  mL IVPB Stopped at 06/23/17 1300   • Respiratory Care per Protocol     • senna-docusate (PERICOLACE or SENOKOT S) 8.6-50 MG per tablet 2 Tab 2 Tab at 06/23/17 0803    And   • polyethylene glycol/lytes (MIRALAX) PACKET 1 Packet      And   • magnesium hydroxide (MILK OF MAGNESIA) suspension 30 mL      And   • bisacodyl (DULCOLAX) suppository 10 mg     • chlorhexidine (PERIDEX) 0.12 % solution 15 mL 15 mL at 06/23/17 0804   • lidocaine (XYLOCAINE) 1%  injection     • MD ALERT...Adult ICU Electrolyte Replacement per Pharmacy Protocol     • fentaNYL (SUBLIMAZE) injection 25 mcg      Or   • fentaNYL (SUBLIMAZE) injection 50 mcg 50 mcg at 06/23/17 0750    Or   • fentaNYL (SUBLIMAZE) injection 100 mcg 100 mcg at 06/23/17 1301   • ipratropium-albuterol (DUONEB) nebulizer solution 3 mL     • famotidine (PEPCID) tablet 20 mg 20 mg at 06/23/17 0803   • insulin lispro (HUMALOG) injection 2-9 Units 5 Units at 06/23/17 1215   • glucose 4 g chewable tablet 16 g      And   • dextrose 50% (D50W) injection 25 mL     • propofol (DIPRIVAN) injection Stopped at 06/23/17 1004   • heparin injection 5,000 Units 5,000 Units at 06/23/17 1301   • MD ALERT... vancomycin per pharmacy protocol     • piperacillin-tazobactam (ZOSYN) 4.5 g in  mL IVPB Stopped at 06/23/17 1251   • norepinephrine (LEVOPHED) 8 mg in  mL Infusion Stopped at 06/22/17 1530    And   • vasopressin (VASOSTRICT) 20 Units in  mL Infusion Stopped at 06/22/17 0115   • levothyroxine (SYNTHROID) tablet 75 mcg 75 mcg at 06/23/17 0617   •  montelukast (SINGULAIR) tablet 10 mg 10 mg at 06/23/17 0803   • acetaminophen (TYLENOL) tablet 650 mg     • methylPREDNISolone sod succ (SOLU-MEDROL) 125 MG injection 62.5 mg 62.5 mg at 06/23/17 1151   • ipratropium-albuterol (DUONEB) nebulizer solution 3 mL 3 mL at 06/23/17 1451     Medical Decision Making, by Problem:  Active Hospital Problems    Diagnosis   • Acute respiratory failure requiring reintubation (CMS-[facility]) [J96.00]     Quality  Measures:  EKG reviewed, Labs reviewed, Medications reviewed and Radiology images reviewed  Mckeon catheter: Critically Ill - Requiring Accurate Measurement of Urinary Output  Central line in place: Sepsis and Vasopressors    DVT Prophylaxis: Heparin    Ulcer prophylaxis: Yes  Antibiotics: Treating active infection/contamination beyond 24 hours perioperative coverage  Assessed for rehab: Patient unable to tolerate rehabilitation therapeutic regimen    Plan:    ACUTE HYPOXIC RESPIRATORY FAILURE  CHRONIC RESPIRATORY FAILURE  AECOPD  - CXR shows worsened bilateral LL infiltrates; echo normal in 3/2017; LE US neg for DVT in 6/2017; CTPE neg in 6/2017; echo in 2015 shows normal EF with G2 diastolic dysfunction and pulmonary htn;   - likely 2/2 COPD exacerbation 2/2 HCAP  - intubated and sedated  - Bronch'd  - Follow cultures  - RT protocol; solumedrol  - ABX: Vanco/Zosyn      SEPSIS 2/2 RESPIRATORY SOURCE  HCAP  LEUKOCYTOSIS  - cultures from prior admission all neg; UA neg; CXR shows worsened infiltrates; bl/urine cultures pending;   - IVF's and broad spectrum abx (vanco/zosyn); she has had multiple recent hosptial admissions and is from nursing home.      CHRONIC NORMOCYTIC ANEMIA  - h/h stable since last discharge      HYPERKALEMIA   - insulin given in ER, resolved on repeat chemistries.       CKD-3   - stable at baseline; avoid nephrotoxins      DM2  - check a1c;   - ISS with Accuchecks; hypoglycemia protocol  - Start lantus 15u      HTN   - BP on lower side currently due to  propofol; hold all home meds  - Pressors to maintain MAP      CHRONIC HEP C   - stable      FIBROMYALGIA   - hold home pain meds      ASTHMA   - continue monteleukast      HYPOTHYROIDISM   - continue home synthroid          PROCEDURES  - Bronchoscopy: 06/21/17  - Endotracheal intubation : 06/21/17      IMAGING  DX-CHEST-PORTABLE (1 VIEW)   Final Result      Stable chest appearance compared with 6/22.      DX-ABDOMEN FOR TUBE PLACEMENT   Final Result      Feeding tube and nasogastric tube in place as noted above.      DX-CHEST-PORTABLE (1 VIEW)   Final Result      Right central venous line in place, with no pneumothorax. Improving pulmonary edema.      DX-CHEST-PORTABLE (1 VIEW)   Final Result      Mid and lower zone opacities, most consistent with pulmonary edema, overall worse compared with 6/13/2017.

## 2017-06-23 NOTE — FLOWSHEET NOTE
06/23/17 1603   Events/Summary/Plan   Events/Summary/Plan SBT ended. Weaning parameters obtained. PT placed back on full support at previous settings.   Weaning Parameters   RR (bpm) 16   #FVC / Vital Capacity (liters)  1.1   NIF (cm H2O)  -46   Rapid Shallow Breathing Index (RR/VT) 31   Spontaneous VE 8.1   Spontaneous    Weaning Trial   Weaning Trial Stopped due to: Pt weaned for 1 hour and returned to rest settings per protocol   Length of Weaning Trial (Hours) 1   Vent Weaning Smart Text completed? Yes

## 2017-06-23 NOTE — PROGRESS NOTES
Pulmonary Critical Care Progress Note      Date of service: 6/23/2017    Chief Complaint: SOB    History of Present Illness: This is a 66-year-old lady who has a history of chronic hypoxemic respiratory failure and chronic obstructive pulmonary disease.  She was hospitalized at [facility] from on or about May 7th to 05/10/2017, with an ankle fracture.  She was then readmitted to [facility] from on or about June 2nd to 06/09/2017, with ventilator dependent respiratory failure and healthcare-associated pneumonia.  She was subsequently sent to a skilled nursing facility.  She now comes back to the emergency room this evening because of increasing shortness of breath.  She was brought in here with wheezing and coarse crackles.  She was in respiratory distress.  She was intubated.  She is now on full mechanical ventilatory support.  Once again, she has been more residing at a skilled nursing facility.    ROS:  Respiratory: unable to perform due to the patient's inability to effectively communicate, Cardiac: unable to perform due to the patient's inability to effectively communicate, GI: unable to perform due to the patient's inability to effectively communicate.  All other systems negative.    Interval Events:  24 hour interval history reviewed  Tmax 98.2 °F   -1L over the last 24 hr, -1.6L since admit   CXR shows: worsening interstitial edema, ongoing bibasilar atelectasis.   Follows commands, opens eyes, nodding appropriately on Propofol.   HB 6.7 this am      PFSH:  No change.    Physical Exam:  General:  Sedate, but follows simple commands to verbal stimuli.   HEENT:  Normo, atraumatic, JAVI.  CVS:  RRR.  Respiratory:  Diminished, but clear.  Abdomen:  Soft, NT.  Extremities:  Trace edema.   Neuro/Psych:  NFE.      Respiratory:  Ballard Vent Mode: APVCMV, Rate (breaths/min): 20, Vt Target (mL): 330, PEEP/CPAP: 8, FiO2: 35, Static Compliance (ml / cm H2O): 36, Control VTE (exp VT): 20  Pulse Oximetry: 100 %  Chest Tube  Drains:        ImagingAvailable data reviewed   Recent Labs      06/22/17   0246  06/22/17   0409  06/23/17   0417   ISTATAPH  7.267*  7.253*  7.350*   ISTATAPCO2  49.6*  48.0*  44.0*   ISTATAPO2  65  76  79   ISTATATCO2  24  23  26   [id number]  89*  92*  95   ISTATARTHCO3  22.6  21.2  24.3   ISTATARTBE  -4  -6*  -1   ISTATTEMP  100.0 F  99.0 F  97.2 F   ISTATFIO2  60  60  35   ISTATSPEC  Arterial  Arterial  Arterial   ISTATAPHTC  7.256*  7.250*  7.362*   [id number]  69  77  75     HemoDynamics:  Pulse: (!) 56, Heart Rate (Monitored): 66  NIBP: 116/58 mmHg  CVP (mm Hg): (!) 16 MM HG  Imaging: Available data reviewed  Recent Labs      06/21/17   2240  06/22/17   1150   TROPONINI  0.02   --    BNPBTYPENAT   --   1186*     Neuro:  GCS Total [place] Coma Score: 10     Imaging: Available data reviewed    Fluids:  Intake/Output       06/21/17 0700 - 06/22/17 0659 06/22/17 0700 - 06/23/17 0659 06/23/17 0700 - 06/24/17 0659      8630-8873 0754-0440 Total 1507-8368 3421-9134 Total 5149-1321 8883-4790 Total       Intake    I.V.  --  -- --  404.4  200.5 604.9  --  -- --    Fentanyl Volume -- -- -- 0 0 0 -- -- --    Vasopressin Volume -- -- -- 0 -- 0 -- -- --    Propofol Volume -- -- -- 259.8 200.5 460.3 -- -- --    Norepinephrine Volume -- -- -- 144.6 0 144.6 -- -- --    Other  --  -- --  30  30 60  --  -- --    Medications (P.O./ Enteral Liquids) -- -- -- 30 30 60 -- -- --    Enteral  --  -- --  395  570 965  --  -- --    Enteral Volume -- -- -- 275 540 815 -- -- --    Free Water / Tube Flush -- -- -- 120 30 150 -- -- --    Total Intake -- -- -- 829.4 800.5 1629.9 -- -- --       Output    Urine  --  786 873  1250  1400 2650  --  -- --    Indwelling Cathether --  1400 2650 -- -- --    Stool  --  -- --  --  -- --  --  -- --    Number of Times Stooled -- -- -- 1 x 1 x 2 x -- -- --    Total Output --  1400 2650 -- -- --       Net I/O     -- -273 -364 -420.6 -599.5 -1020.1 -- -- --           Recent Labs       17   1150  17   0502   SODIUM  132*  136  141   POTASSIUM  6.1*  4.7  4.0   CHLORIDE  102  106  109   CO2     BUN  29*  34*  34*   CREATININE  1.41*  1.56*  1.23   MAGNESIUM   --    --   2.1   PHOSPHORUS   --    --   3.7   CALCIUM  9.0  8.6  8.5     GI/Nutrition:  Imaging: Available data reviewed  NPO     Liver Function  Recent Labs      17   11517   0502   ALTSGPT  15   --    --    ASTSGOT  16   --    --    ALKPHOSPHAT  59   --    --    TBILIRUBIN  0.8   --    --    GLUCOSE  179*  351*  269*     Heme:  Recent Labs      17   0500   RBC  3.03*  2.64*  2.40*   HEMOGLOBIN  8.6*  7.6*  6.7*   HEMATOCRIT  29.0*  24.6*  22.3*   PLATELETCT  461*  281  189   PROTHROMBTM  14.4   --    --    APTT  34.3   --    --    INR  1.09   --    --      Infectious Disease:  Monitored Temp  Av.6 °C (97.9 °F)  Min: 36.1 °C (97 °F)  Max: 36.8 °C (98.2 °F)  Micro: cultures reviewed  Recent Labs      17   11517   0500   WBC  24.6*  15.6*  15.4*   NEUTSPOLYS  86.80*  97.10*  95.50*   LYMPHOCYTES  4.80*  1.30*  1.50*   MONOCYTES  4.50  0.70  2.00   EOSINOPHILS  1.80  0.00  0.00   BASOPHILS  0.60  0.10  0.10   ASTSGOT  16   --    --    ALTSGPT  15   --    --    ALKPHOSPHAT  59   --    --    TBILIRUBIN  0.8   --    --      Current Facility-Administered Medications   Medication Dose Frequency Provider Last Rate Last Dose   • Respiratory Care per Protocol   Continuous RT Angel Lema M.D.       • senna-docusate (PERICOLACE or SENOKOT S) 8.6-50 MG per tablet 2 Tab  2 Tab BID Angel Lema M.D.   2 Tab at 17    And   • polyethylene glycol/lytes (MIRALAX) PACKET 1 Packet  1 Packet QDAY PRN Angel Lema M.D.        And   • magnesium hydroxide (MILK OF MAGNESIA) suspension 30 mL  30 mL QDAY PRN Angel Lema M.D.        And   • bisacodyl (DULCOLAX)  suppository 10 mg  10 mg QDAY PRN Angel Lema M.D.       • chlorhexidine (PERIDEX) 0.12 % solution 15 mL  15 mL BID Angel Lema M.D.   15 mL at 06/22/17 2051   • lidocaine (XYLOCAINE) 1%  injection  1-2 mL Q30 MIN PRN Angel Lema M.D.       • MD ALERT...Adult ICU Electrolyte Replacement per Pharmacy Protocol   pharmacy to dose Angel Lema M.D.       • NS infusion   Continuous Angel Lema M.D. 50 mL/hr at 06/22/17 1655     • fentaNYL (SUBLIMAZE) injection 25 mcg  25 mcg Q HOUR PRN Angel Lema M.D.        Or   • fentaNYL (SUBLIMAZE) injection 50 mcg  50 mcg Q HOUR PRN Angel Lema M.D.   50 mcg at 06/23/17 0001    Or   • fentaNYL (SUBLIMAZE) injection 100 mcg  100 mcg Q HOUR PRN Angel Lema M.D.   100 mcg at 06/23/17 0459   • fentaNYL (SUBLIMAZE) 50 mcg/mL in 50mL   Continuous Angel Lema M.D.   Stopped at 06/22/17 0713   • ipratropium-albuterol (DUONEB) nebulizer solution 3 mL  3 mL Q2HRS PRN (RT) Angel Lema M.D.       • famotidine (PEPCID) tablet 20 mg  20 mg DAILY Angel Lema M.D.   20 mg at 06/22/17 0808    Or   • famotidine (PEPCID) injection 20 mg  20 mg DAILY Angel Lema M.D.       • insulin lispro (HUMALOG) injection 2-9 Units  2-9 Units Q6HRS Angel Lema M.D.   5 Units at 06/23/17 0635   • glucose 4 g chewable tablet 16 g  16 g Q15 MIN PRN Angel Lema M.D.        And   • dextrose 50% (D50W) injection 25 mL  25 mL Q15 MIN PRN Angel Lema M.D.       • propofol (DIPRIVAN) injection  5-80 mcg/kg/min Continuous Angel Lema M.D. 28.1 mL/hr at 06/23/17 0648 45 mcg/kg/min at 06/23/17 0648   • heparin injection 5,000 Units  5,000 Units Q8HRS Angel Lema M.D.   5,000 Units at 06/23/17 0618   • MD ALERT... vancomycin per pharmacy protocol   pharmacy to dose Angel Lema M.D.       • piperacillin-tazobactam (ZOSYN) 4.5 g in NS  100 mL IVPB  4.5 g Q6HRS Angel Lema M.D. 100 mL/hr at 06/23/17 0619 4.5 g at 06/23/17 0619   • NS infusion 3,120 mL  30 mL/kg Once PRN Angel Lema M.D.       • NS (BOLUS) infusion 500 mL  500 mL Once PRN Angel Lema M.D.       • norepinephrine (LEVOPHED) 8 mg in  mL Infusion  0.5-30 mcg/min Continuous Angel Lema M.D.   Stopped at 06/22/17 1530    And   • vasopressin (VASOSTRICT) 20 Units in  mL Infusion  0.03 Units/min Continuous Angel Lema M.D.   Stopped at 06/22/17 0115   • levothyroxine (SYNTHROID) tablet 75 mcg  75 mcg AM ES Carito Richards M.D.   75 mcg at 06/23/17 0617   • montelukast (SINGULAIR) tablet 10 mg  10 mg DAILY Carito Richards M.D.   10 mg at 06/22/17 0808   • acetaminophen (TYLENOL) tablet 650 mg  650 mg Q6HRS PRN Carito Richards M.D.       • methylPREDNISolone sod succ (SOLU-MEDROL) 125 MG injection 62.5 mg  62.5 mg Q6HRS Carito Richards M.D.   62.5 mg at 06/23/17 0628   • insulin glargine (LANTUS) injection 15 Units  15 Units [facility] SARA Hope M.D.   15 Units at 06/23/17 0634   • ipratropium-albuterol (DUONEB) nebulizer solution 3 mL  3 mL 4X/DAY (RT) Angel Lema M.D.   3 mL at 06/23/17 0700     Last reviewed on 6/22/2017  2:52 PM by Lucinda Alcantara FAUSTO    Quality  Measures:  Labs reviewed, Medications reviewed and Radiology images reviewed                    Lines:  Right-sided PICC    Gtts:  Fentanyl 50  Levo off  NS 50  Propofol 20    Abx:  Day 3 Vancomycin  Day 3 Zosyn    Solumedrol 62.5 q6    Rcx 6/19 negative    Echo 3/17 EF 60%, RVSP 60    Assessment/Plan  -  Ventilator-dependent respiratory failure.   - rt/02 protocol   - full vent support   - daily sat/sbt   - steroids/bds   - empiric abx   - f/u cultures   - dc ivf and gentle diureses   -  Acute exacerbation of chronic obstructive pulmonary disease.   - bds, steriods   - empiric abx  -  Severe sepsis, pulmonary  source.   - sepsis protocol   - levo off   - goal cvp 10-12   - procalcitonin 2.9   - f/u cultures and continue abx  -  Health-care-associated pneumonia.   - as above    - on vanco/zosyn  -  Anemia   - no obvious source of loss   - 1u prbcs today  -  Possible coexisting acute pulmonary edema secondary to acute on chronic    diastolic heart failure.   - monitor volume balance   - continue resuscitation while on pressors and low map  -  Hyperglycemia - IDDM + steroids   - lantus (home dose 22u)   - ssi   -  Pulmonary HTN w RVSP of 60    -  Obstructive sleep apnea.  -  Gastroesophageal reflux disease.  -  Hepatitis C.  -  History of systemic arterial hypertension.  -  Diabetes mellitus type 2.  -  Chronic kidney disease.  -  History of atrial fibrillation.  -  Fibromyalgia.  -  Post-traumatic stress disorder.  -  Hyponatremia.  -  Hyperkalemia.  -  Recent ankle fracture in May of this year.    Discussed patient condition and risk of morbidity and/or mortality with RN, RT, Therapies, Pharmacy, Charge nurse / hot rounds and QA team.    The patient remains critically ill.  Critical care time = 36 minutes in directly providing and coordinating critical care and extensive data review.  No time overlap and excludes procedures.

## 2017-06-23 NOTE — PROGRESS NOTES
"Pharmacy Kinetics 66 y.o. female on vancomycin day # 2  2017    Currently on Vancomycin Pulse Dosing d/t Concerns for Accumulation   : Vanco load 2600 mg IV at 0043    Indication for Treatment: Empiric - HCAP    Pertinent history per medical record: Admitted from [facility] on 2017 for respiratory failure. Patient developed increasing SOB per staff at [facility], found to be hypoxic on EMS arrival. Patient required intubation in ED, noted to have leukocytosis and opacities present on CXR, thus empiric antibiotics initiated for possible HCAP. Of note, patient also hospitalized in May with pneumonia requiring intubation at that time as well.     Other antibiotics: piperacillin/tazobactam 4.5 gm IV q6hrs    Allergies: Vitamin c; Keflex; Codeine; Lyrica; and Sudafed     Concerns for renal function include age, BUN/SCr ratio > 20:1 with SCr improving since admission, grade II diastolic dysfunction, obesity (BMI ~39), recent hypotension requiring vasopressor support & concurrent furosemide.    Pertinent cultures to date:   : BW - NGTD  : urine cx - in process  : peripheral blood cx X2 - NGTD    Imagin/23: CXR - Hazy opacities persist throughout the mid and lower zones bilaterally, consistent with pulmonary edema.    Recent Labs      170  17   1150  17   0500   WBC  24.6*  15.6*  15.4*   NEUTSPOLYS  86.80*  97.10*  95.50*     Recent Labs      17   2240  17   1150  17   0502   BUN  29*  34*  34*   CREATININE  1.41*  1.56*  1.23   ALBUMIN  3.4   --    --      Recent Labs      17   1150  17   0502   VANCORANDOM  32.4  17.2     Intake/Output Summary (Last 24 hours) at 17 1016  Last data filed at 17 0800   Gross per 24 hour   Intake 1346.98 ml   Output   2075 ml   Net -728.02 ml      Blood pressure 146/63, pulse 82, temperature 36.7 °C (98.1 °F), resp. rate 20, height 1.638 m (5' 4.49\"), weight 104.2 kg (229 lb 11.5 oz), SpO2 99 %. Temp " (24hrs), Av.7 °C (98.1 °F), Min:36.7 °C (98.1 °F), Max:36.7 °C (98.1 °F)      A/P   1. Vancomycin dose change: Start vanco 2 gm IV daily  2. Next vancomycin level: 2 days (not currently ordered)  3. Goal trough: 16-20 mcg/mL  4. Comments: Patient clinically improved, titrated off vasopressor support and plan for vent weaning. Patient's SCr and UOP have improved, random vanco level this AM within therapeutic range. Will start conservative once daily vanco regimen and plan repeat trough in ~2 days to evaluate and adjust as necessary. Patient's cultures remain negative thus far. Will continue to follow cultures and recommend de-escalation of antibiotics if continued MRSA coverage is no longer indicated.    Pharmacy will continue to follow.     Asiya Villavicencio, KhoiD

## 2017-06-23 NOTE — CARE PLAN
Problem: Communication  Goal: The ability to communicate needs accurately and effectively will improve  Outcome: PROGRESSING AS EXPECTED  Pt nods appropriately, is intubated.    Problem: Skin Integrity  Goal: Risk for impaired skin integrity will decrease  Outcome: PROGRESSING AS EXPECTED  Q2hr turns, devices repositioned, and kept off skin is able to.

## 2017-06-23 NOTE — PROGRESS NOTES
[facility] Progress Note               Author: Emmettjaycee Lyons Date & Time created: 6/22/2017  5:43 PM     Interval History:  06/22/2017: Remains on vent, starting tube feeds.  Bronch'd today with tan secretions.  Cultures NGTD.  Continue current ABX.  Hyperkalemia resolved.  Renal function stable.  Start lantus 15u for elevated sugars.      Disposition: ICU.      Review of Systems:  Review of Systems   Unable to perform ROS: intubated       Physical Exam  Constitutional: She is well-developed, well-nourished, intubated, non-diaphoretic.   HENT:    Head: Normocephalic and atraumatic.    Eyes: Pupils are equal, round, and reactive to light.   Neck: Neck supple. No JVD present. No tracheal deviation present. No thyromegaly present.   Cardiovascular: Normal rate, regular rhythm and normal heart sounds.  Exam reveals no gallop and no friction rub.     No murmur heard.  Pulmonary/Chest: She has wheezes. She has no rales.   Musculoskeletal: She exhibits edema.   Lymphadenopathy:     She has no cervical adenopathy.   Neurological: Sedated  Skin: Skin is warm and dry. She is not diaphoretic.     Labs:  Recent Results (from the past 24 hour(s))   CBC w/ Differential    Collection Time: 06/21/17 10:40 PM   Result Value Ref Range    WBC 24.6 (H) 4.8 - 10.8 K/uL    RBC 3.03 (L) 4.20 - 5.40 M/uL    Hemoglobin 8.6 (L) 12.0 - 16.0 g/dL    Hematocrit 29.0 (L) 37.0 - 47.0 %    MCV 95.7 81.4 - 97.8 fL    MCH 28.4 27.0 - 33.0 pg    MCHC 29.7 (L) 33.6 - 35.0 g/dL    RDW 60.2 (H) 35.9 - 50.0 fL    Platelet Count 461 (H) 164 - 446 K/uL    MPV 8.6 (L) 9.0 - 12.9 fL    Neutrophils-Polys 86.80 (H) 44.00 - 72.00 %    Lymphocytes 4.80 (L) 22.00 - 41.00 %    Monocytes 4.50 0.00 - 13.40 %    Eosinophils 1.80 0.00 - 6.90 %    Basophils 0.60 0.00 - 1.80 %    Immature Granulocytes 1.50 (H) 0.00 - 0.90 %    Nucleated RBC 0.10 /100 WBC    Neutrophils (Absolute) 21.39 (H) 2.00 - 7.15 K/uL    Lymphs (Absolute) 1.18 1.00 - 4.80 K/uL    Monos (Absolute) 1.10  (H) 0.00 - 0.85 K/uL    Eos (Absolute) 0.45 0.00 - 0.51 K/uL    Baso (Absolute) 0.15 (H) 0.00 - 0.12 K/uL    Immature Granulocytes (abs) 0.36 (H) 0.00 - 0.11 K/uL    NRBC (Absolute) 0.03 K/uL   Complete Metabolic Panel (CMP)    Collection Time: 06/21/17 10:40 PM   Result Value Ref Range    Sodium 132 (L) 135 - 145 mmol/L    Potassium 6.1 (H) 3.6 - 5.5 mmol/L    Chloride 102 96 - 112 mmol/L    Co2 22 20 - 33 mmol/L    Anion Gap 8.0 0.0 - 11.9    Glucose 179 (H) 65 - 99 mg/dL    Bun 29 (H) 8 - 22 mg/dL    Creatinine 1.41 (H) 0.50 - 1.40 mg/dL    Calcium 9.0 8.5 - 10.5 mg/dL    AST(SGOT) 16 12 - 45 U/L    ALT(SGPT) 15 2 - 50 U/L    Alkaline Phosphatase 59 30 - 99 U/L    Total Bilirubin 0.8 0.1 - 1.5 mg/dL    Albumin 3.4 3.2 - 4.9 g/dL    Total Protein 6.9 6.0 - 8.2 g/dL    Globulin 3.5 1.9 - 3.5 g/dL    A-G Ratio 1.0 g/dL   Troponin STAT    Collection Time: 06/21/17 10:40 PM   Result Value Ref Range    Troponin I 0.02 0.00 - 0.04 ng/mL   LACTIC ACID    Collection Time: 06/21/17 10:40 PM   Result Value Ref Range    Lactic Acid 0.9 0.5 - 2.0 mmol/L   PT/INR    Collection Time: 06/21/17 10:40 PM   Result Value Ref Range    PT 14.4 12.0 - 14.6 sec    INR 1.09 0.87 - 1.13   APTT    Collection Time: 06/21/17 10:40 PM   Result Value Ref Range    APTT 34.3 24.7 - 36.0 sec   ESTIMATED GFR    Collection Time: 06/21/17 10:40 PM   Result Value Ref Range    GFR If African American 45 (A) >60 mL/min/1.73 m 2    GFR If Non  37 (A) >60 mL/min/1.73 m 2   Hemoglobin A1c    Collection Time: 06/21/17 10:40 PM   Result Value Ref Range    Glycohemoglobin 5.2 0.0 - 5.6 %    Est Avg Glucose 103 mg/dL   EKG (NOW)    Collection Time: 06/21/17 10:42 PM   Result Value Ref Range    Report       [facility]    Test Date:  2017-06-21  Pt Name:    CHAITANYA MOULTONPSON            Department: ER  MRN:        [id number]                      Room:       [facility]  Gender:     F                             Technician: [id number]  :        [id number]                   Requested By:SHE CARR  Order #:    [id number]                    Reading MD:    Measurements  Intervals                                Axis  Rate:       75                           P:          5  NC:         168                          QRS:        6  QRSD:       86                           T:          75  QT:         352  QTc:        394    Interpretive Statements  SINUS RHYTHM  PROBABLE LEFT ATRIAL ABNORMALITY  BORDERLINE LOW VOLTAGE IN FRONTAL LEADS  ABNORMAL T, CONSIDER ISCHEMIA, LATERAL LEADS  Compared to ECG 2017 07:50:12  T-wave abnormality now present  Possible ischemia now present     ISTAT ARTERIAL BLOOD GAS    Collection Time: 17 12:16 AM   Result Value Ref Range    Ph 7.218 (LL) 7.400 - 7.500    Pco2 58.0 (HH) 26.0 - 37.0 mmHg    Po2 146 (H) 64 - 87 mmHg    Tco2 25 20 - 33 mmol/L    S02 99 93 - 99 %    Hco3 23.6 17.0 - 25.0 mmol/L    BE -4 -4 - 3 mmol/L    Body Temp see below degrees    O2 Therapy 100 %    Specimen Arterial    URINALYSIS    Collection Time: 17  1:00 AM   Result Value Ref Range    Micro Urine Req Microscopic     Color Yellow     Character Sl Cloudy (A)     Specific Gravity 1.014 <1.035    Ph 5.5 5.0-8.0    Glucose Negative Negative mg/dL    Ketones Negative Negative mg/dL    Protein 300 (A) Negative mg/dL    Bilirubin Negative Negative    Nitrite Negative Negative    Leukocyte Esterase Negative Negative    Occult Blood Negative Negative   URINE MICROSCOPIC (W/UA)    Collection Time: 17  1:00 AM   Result Value Ref Range    WBC 2-5 /hpf    RBC 2-5 (A) /hpf    Bacteria Few (A) None /hpf    Epithelial Cells Few /hpf    Mucous Threads Few /hpf    Amorphous Crystal Present /hpf    Hyaline Cast 3-5 (A) /lpf   AFB CULTURE    Collection Time: 17  1:49 AM   Result Value Ref Range    Significant Indicator NEG     Source RESP     Site bilateral bronchial washings     AFB Culture Culture in progress.      AFB Smear Results No acid fast bacilli seen.    GRAM STAIN    Collection Time: 06/22/17  1:49 AM   Result Value Ref Range    Significant Indicator .     Source RESP     Site bilateral bronchial washings     Gram Stain Result Moderate WBCs.  No organisms seen.      ACID FAST STAIN    Collection Time: 06/22/17  1:49 AM   Result Value Ref Range    Significant Indicator NEG     Source RESP     Site bilateral bronchial washings     AFB Smear Results No acid fast bacilli seen.    ISTAT ARTERIAL BLOOD GAS    Collection Time: 06/22/17  2:46 AM   Result Value Ref Range    Ph 7.267 (LL) 7.400 - 7.500    Pco2 49.6 (H) 26.0 - 37.0 mmHg    Po2 65 64 - 87 mmHg    Tco2 24 20 - 33 mmol/L    S02 89 (L) 93 - 99 %    Hco3 22.6 17.0 - 25.0 mmol/L    BE -4 -4 - 3 mmol/L    Body Temp 100.0 F degrees    O2 Therapy 60 %    Ph Temp Mariola 7.256 (LL) 7.400 - 7.500    Pco2 Temp Co 51.4 (HH) 26.0 - 37.0 mmHg    Po2 Temp Cor 69 64 - 87 mmHg    Specimen Arterial    ISTAT ARTERIAL BLOOD GAS    Collection Time: 06/22/17  4:09 AM   Result Value Ref Range    Ph 7.253 (LL) 7.400 - 7.500    Pco2 48.0 (H) 26.0 - 37.0 mmHg    Po2 76 64 - 87 mmHg    Tco2 23 20 - 33 mmol/L    S02 92 (L) 93 - 99 %    Hco3 21.2 17.0 - 25.0 mmol/L    BE -6 (L) -4 - 3 mmol/L    Body Temp 99.0 F degrees    O2 Therapy 60 %    Ph Temp Mariola 7.250 (LL) 7.400 - 7.500    Pco2 Temp Co 48.5 (H) 26.0 - 37.0 mmHg    Po2 Temp Cor 77 64 - 87 mmHg    Specimen Arterial    ACCU-CHEK GLUCOSE    Collection Time: 06/22/17  5:45 AM   Result Value Ref Range    Glucose - Accu-Ck 264 (H) 65 - 99 mg/dL   PROCALCITONIN    Collection Time: 06/22/17  5:50 AM   Result Value Ref Range    Procalcitonin 2.89 (H) <0.25 ng/mL   ACCU-CHEK GLUCOSE    Collection Time: 06/22/17 11:49 AM   Result Value Ref Range    Glucose - Accu-Ck 346 (H) 65 - 99 mg/dL   VANCOMYCIN TIMED (RANDOM) LEVEL    Collection Time: 06/22/17 11:50 AM   Result Value Ref Range    Vancomycin Unknown Level 32.4 ug/mL   BASIC METABOLIC PANEL     Collection Time: 17 11:50 AM   Result Value Ref Range    Sodium 136 135 - 145 mmol/L    Potassium 4.7 3.6 - 5.5 mmol/L    Chloride 106 96 - 112 mmol/L    Co2 22 20 - 33 mmol/L    Glucose 351 (H) 65 - 99 mg/dL    Bun 34 (H) 8 - 22 mg/dL    Creatinine 1.56 (H) 0.50 - 1.40 mg/dL    Calcium 8.6 8.5 - 10.5 mg/dL    Anion Gap 8.0 0.0 - 11.9   BTYPE NATRIURETIC PEPTIDE    Collection Time: 17 11:50 AM   Result Value Ref Range    B Natriuretic Peptide 1186 (H) 0 - 100 pg/mL   CBC WITH DIFFERENTIAL    Collection Time: 17 11:50 AM   Result Value Ref Range    WBC 15.6 (H) 4.8 - 10.8 K/uL    RBC 2.64 (L) 4.20 - 5.40 M/uL    Hemoglobin 7.6 (L) 12.0 - 16.0 g/dL    Hematocrit 24.6 (L) 37.0 - 47.0 %    MCV 93.2 81.4 - 97.8 fL    MCH 28.8 27.0 - 33.0 pg    MCHC 30.9 (L) 33.6 - 35.0 g/dL    RDW 56.4 (H) 35.9 - 50.0 fL    Platelet Count 281 164 - 446 K/uL    MPV 9.1 9.0 - 12.9 fL    Neutrophils-Polys 97.10 (H) 44.00 - 72.00 %    Lymphocytes 1.30 (L) 22.00 - 41.00 %    Monocytes 0.70 0.00 - 13.40 %    Eosinophils 0.00 0.00 - 6.90 %    Basophils 0.10 0.00 - 1.80 %    Immature Granulocytes 0.80 0.00 - 0.90 %    Nucleated RBC 0.00 /100 WBC    Neutrophils (Absolute) 15.14 (H) 2.00 - 7.15 K/uL    Lymphs (Absolute) 0.20 (L) 1.00 - 4.80 K/uL    Monos (Absolute) 0.11 0.00 - 0.85 K/uL    Eos (Absolute) 0.00 0.00 - 0.51 K/uL    Baso (Absolute) 0.02 0.00 - 0.12 K/uL    Immature Granulocytes (abs) 0.13 (H) 0.00 - 0.11 K/uL    NRBC (Absolute) 0.00 K/uL   ESTIMATED GFR    Collection Time: 17 11:50 AM   Result Value Ref Range    GFR If  40 (A) >60 mL/min/1.73 m 2    GFR If Non  33 (A) >60 mL/min/1.73 m 2   ACCU-CHEK GLUCOSE    Collection Time: 17  5:01 PM   Result Value Ref Range    Glucose - Accu-Ck 362 (H) 65 - 99 mg/dL       Hemodynamics:  Temp (24hrs), Av.7 °C (99.9 °F), Min:37.6 °C (99.7 °F), Max:37.8 °C (100 °F)  Temperature: 37.8 °C (100 °F), Monitored Temp: 36.6 °C (97.9  °F)  Pulse  Av.9  Min: 52  Max: 82Heart Rate (Monitored): 68  Blood Pressure : 131/45 mmHg, NIBP: (!) 94/46 mmHg  CVP (mm Hg): (!) 14 MM HG  Respiratory:  Ballard Vent Mode: APVCMV, Rate (breaths/min): 20, PEEP/CPAP: 8, FiO2: 50, P Peak (PIP): 24, P MEAN: 13 Respiration: 19, Pulse Oximetry: 96 %     Work Of Breathing / Effort: Vented  RUL Breath Sounds: Clear, RML Breath Sounds: Diminished, RLL Breath Sounds: Diminished, BIRD Breath Sounds: Clear, LLL Breath Sounds: Diminished  Fluids:    Intake/Output Summary (Last 24 hours) at 17 1743  Last data filed at 17 1400   Gross per 24 hour   Intake 643.69 ml   Output   1600 ml   Net -956.31 ml     Weight: 104.2 kg (229 lb 11.5 oz) (Weighed with boot on)  GI/Nutrition:  Orders Placed This Encounter   Procedures   • Diet NPO     Standing Status: Standing      Number of Occurrences: 1      Standing Expiration Date:      Order Specific Question:  Restrict to:     Answer:  With Tube Feed [4]     Medications:  Current Facility-Administered Medications   Medication Last Dose   • Respiratory Care per Protocol     • senna-docusate (PERICOLACE or SENOKOT S) 8.6-50 MG per tablet 2 Tab 2 Tab at 17 0808    And   • polyethylene glycol/lytes (MIRALAX) PACKET 1 Packet      And   • magnesium hydroxide (MILK OF MAGNESIA) suspension 30 mL      And   • bisacodyl (DULCOLAX) suppository 10 mg     • chlorhexidine (PERIDEX) 0.12 % solution 15 mL 15 mL at 17 0808   • lidocaine (XYLOCAINE) 1%  injection     • MD ALERT...Adult ICU Electrolyte Replacement per Pharmacy Protocol     • NS infusion     • fentaNYL (SUBLIMAZE) injection 25 mcg      Or   • fentaNYL (SUBLIMAZE) injection 50 mcg 50 mcg at 17 1632    Or   • fentaNYL (SUBLIMAZE) injection 100 mcg     • fentaNYL (SUBLIMAZE) 50 mcg/mL in 50mL Stopped at 17 0713   • ipratropium-albuterol (DUONEB) nebulizer solution 3 mL     • ipratropium-albuterol (DUONEB) nebulizer solution 3 mL 3 mL at 17 1446   •  famotidine (PEPCID) tablet 20 mg 20 mg at 06/22/17 0808    Or   • famotidine (PEPCID) injection 20 mg     • insulin lispro (HUMALOG) injection 2-9 Units 8 Units at 06/22/17 1701   • glucose 4 g chewable tablet 16 g      And   • dextrose 50% (D50W) injection 25 mL     • propofol (DIPRIVAN) injection 20 mcg/kg/min at 06/22/17 1355   • heparin injection 5,000 Units 5,000 Units at 06/22/17 1355   • MD ALERT... vancomycin per pharmacy protocol     • piperacillin-tazobactam (ZOSYN) 4.5 g in  mL IVPB 4.5 g at 06/22/17 1652   • NS infusion 3,120 mL     • NS (BOLUS) infusion 500 mL     • norepinephrine (LEVOPHED) 8 mg in  mL Infusion Stopped at 06/22/17 1530    And   • vasopressin (VASOSTRICT) 20 Units in  mL Infusion Stopped at 06/22/17 0115   • levothyroxine (SYNTHROID) tablet 75 mcg 75 mcg at 06/22/17 0539   • montelukast (SINGULAIR) tablet 10 mg 10 mg at 06/22/17 0808   • acetaminophen (TYLENOL) tablet 650 mg     • methylPREDNISolone sod succ (SOLU-MEDROL) 125 MG injection 62.5 mg 62.5 mg at 06/22/17 1653   • insulin glargine (LANTUS) injection 15 Units 15 Units at 06/22/17 1433     Medical Decision Making, by Problem:  Active Hospital Problems    Diagnosis   • Acute respiratory failure requiring reintubation (CMS-[facility]) [J96.00]     Quality  Measures:  EKG reviewed, Labs reviewed, Medications reviewed and Radiology images reviewed  Mckeon catheter: Critically Ill - Requiring Accurate Measurement of Urinary Output  Central line in place: Sepsis and Vasopressors    DVT Prophylaxis: Heparin    Ulcer prophylaxis: Yes  Antibiotics: Treating active infection/contamination beyond 24 hours perioperative coverage  Assessed for rehab: Patient unable to tolerate rehabilitation therapeutic regimen    Plan:    ACUTE HYPOXIC RESPIRATORY FAILURE  CHRONIC RESPIRATORY FAILURE  AECOPD  - CXR shows worsened bilateral LL infiltrates; echo normal in 3/2017; LE US neg for DVT in 6/2017; CTPE neg in 6/2017; echo in 2015 shows  normal EF with G2 diastolic dysfunction and pulmonary htn;   - likely 2/2 COPD exacerbation 2/2 HCAP  - intubated and sedated  - Bronch'd  - Follow cultures  - RT protocol; solumedrol  - ABX: Vanco/Zosyn      SEPSIS 2/2 RESPIRATORY SOURCE  HCAP  LEUKOCYTOSIS  - cultures from prior admission all neg; UA neg; CXR shows worsened infiltrates; bl/urine cultures pending;   - IVF's and broad spectrum abx (vanco/zosyn); she has had multiple recent hosptial admissions and is from nursing home.      CHRONIC NORMOCYTIC ANEMIA  - h/h stable since last discharge      HYPERKALEMIA   - insulin given in ER, resolved on repeat chemistries.       CKD-3   - stable at baseline; avoid nephrotoxins      DM2  - check a1c;   - ISS with Accuchecks; hypoglycemia protocol  - Start lantus 15u      HTN   - BP on lower side currently due to propofol; hold all home meds  - Pressors to maintain MAP      CHRONIC HEP C   - stable      FIBROMYALGIA   - hold home pain meds      ASTHMA   - continue monteleukast      HYPOTHYROIDISM   - continue home synthroid          PROCEDURES  - Bronchoscopy: 06/21/17  - Endotracheal intubation : 06/21/17      IMAGING  DX-ABDOMEN FOR TUBE PLACEMENT   Final Result      Feeding tube and nasogastric tube in place as noted above.      DX-CHEST-PORTABLE (1 VIEW)   Final Result      Right central venous line in place, with no pneumothorax. Improving pulmonary edema.      DX-CHEST-PORTABLE (1 VIEW)   Final Result      Mid and lower zone opacities, most consistent with pulmonary edema, overall worse compared with 6/13/2017.

## 2017-06-23 NOTE — PROGRESS NOTES
Report received from day shift RNLiana. Pt responds with head nods, moves all, follows commands. Lines and gtt verified, Bed locked and alarm set.

## 2017-06-23 NOTE — CARE PLAN
Problem: Ventilation Defect:  Goal: Ability to achieve and maintain unassisted ventilation or tolerate decreased levels of ventilator support  Intervention: Support and monitor invasive and noninvasive mechanical ventilation  Adult Ventilation Update    Total Vent Days: 3      Patient Lines/Drains/Airways Status    Active Airway      Name: Placement date: Placement time: Site: Days:     Airway Group ET Tube Oral 8.0 06/21/17 2245  Oral  1                 In the last 24 hours, the patient tolerated SBT for 2 hours on settings of 5/8.    #FVC / Vital Capacity (liters) : 0.85 (06/23/17 0813)  NIF (cm H2O) : -41 (06/23/17 0813)  Rapid Shallow Breathing Index (RR/VT): 44 (06/23/17 0813)  Plateau Pressure (Q Shift): 21 (06/23/17 0225)  Static Compliance (ml / cm H2O): 118 (06/23/17 1447)    Weaning Trial Stopped due to:: Pt weaned for 1 hour and returned to rest settings per protocol (06/23/17 0813)  Length of Weaning Trial (Hours): 1 (06/23/17 0813)    Sputum/Suction   Cough: Strong (06/23/17 1200)  Sputum Amount: Small (06/23/17 1200)  Sputum Color: White;Clear (06/23/17 1200)  Sputum Consistency: Thin (06/23/17 1200)    Events/Summary/Plan: SBT x2 today. No changes made to vent settings today.

## 2017-06-24 ENCOUNTER — APPOINTMENT (OUTPATIENT)
Dept: RADIOLOGY | Facility: MEDICAL CENTER | Age: 67
DRG: 870 | End: 2017-06-24
Attending: INTERNAL MEDICINE
Payer: MEDICARE

## 2017-06-24 LAB
ANION GAP SERPL CALC-SCNC: 8 MMOL/L (ref 0–11.9)
BACTERIA SPEC RESP CULT: NORMAL
BACTERIA UR CULT: NORMAL
BASE EXCESS BLDA CALC-SCNC: -4 MMOL/L (ref -4–3)
BASOPHILS # BLD AUTO: 0.1 % (ref 0–1.8)
BASOPHILS # BLD: 0.01 K/UL (ref 0–0.12)
BODY TEMPERATURE: ABNORMAL DEGREES
BUN SERPL-MCNC: 46 MG/DL (ref 8–22)
CALCIUM SERPL-MCNC: 8.8 MG/DL (ref 8.5–10.5)
CHLORIDE SERPL-SCNC: 111 MMOL/L (ref 96–112)
CO2 BLDA-SCNC: 23 MMOL/L (ref 20–33)
CO2 SERPL-SCNC: 22 MMOL/L (ref 20–33)
CREAT SERPL-MCNC: 1.2 MG/DL (ref 0.5–1.4)
EOSINOPHIL # BLD AUTO: 0 K/UL (ref 0–0.51)
EOSINOPHIL NFR BLD: 0 % (ref 0–6.9)
ERYTHROCYTE [DISTWIDTH] IN BLOOD BY AUTOMATED COUNT: 59.7 FL (ref 35.9–50)
GFR SERPL CREATININE-BSD FRML MDRD: 45 ML/MIN/1.73 M 2
GLUCOSE BLD-MCNC: 262 MG/DL (ref 65–99)
GLUCOSE BLD-MCNC: 279 MG/DL (ref 65–99)
GLUCOSE BLD-MCNC: 294 MG/DL (ref 65–99)
GLUCOSE BLD-MCNC: 316 MG/DL (ref 65–99)
GLUCOSE BLD-MCNC: 335 MG/DL (ref 65–99)
GLUCOSE SERPL-MCNC: 310 MG/DL (ref 65–99)
GRAM STN SPEC: NORMAL
HCO3 BLDA-SCNC: 21.6 MMOL/L (ref 17–25)
HCT VFR BLD AUTO: 27.2 % (ref 37–47)
HGB BLD-MCNC: 8.3 G/DL (ref 12–16)
IMM GRANULOCYTES # BLD AUTO: 0.17 K/UL (ref 0–0.11)
IMM GRANULOCYTES NFR BLD AUTO: 1.1 % (ref 0–0.9)
LYMPHOCYTES # BLD AUTO: 0.23 K/UL (ref 1–4.8)
LYMPHOCYTES NFR BLD: 1.6 % (ref 22–41)
MAGNESIUM SERPL-MCNC: 2.1 MG/DL (ref 1.5–2.5)
MCH RBC QN AUTO: 28.3 PG (ref 27–33)
MCHC RBC AUTO-ENTMCNC: 30.5 G/DL (ref 33.6–35)
MCV RBC AUTO: 92.8 FL (ref 81.4–97.8)
MONOCYTES # BLD AUTO: 0.2 K/UL (ref 0–0.85)
MONOCYTES NFR BLD AUTO: 1.3 % (ref 0–13.4)
NEUTROPHILS # BLD AUTO: 14.22 K/UL (ref 2–7.15)
NEUTROPHILS NFR BLD: 95.9 % (ref 44–72)
NRBC # BLD AUTO: 0.03 K/UL
NRBC BLD AUTO-RTO: 0.2 /100 WBC
O2/TOTAL GAS SETTING VFR VENT: 30 %
PCO2 BLDA: 38.7 MMHG (ref 26–37)
PCO2 TEMP ADJ BLDA: 37.8 MMHG (ref 26–37)
PH BLDA: 7.36 [PH] (ref 7.4–7.5)
PH TEMP ADJ BLDA: 7.36 [PH] (ref 7.4–7.5)
PHOSPHATE SERPL-MCNC: 3.3 MG/DL (ref 2.5–4.5)
PLATELET # BLD AUTO: 210 K/UL (ref 164–446)
PMV BLD AUTO: 9.1 FL (ref 9–12.9)
PO2 BLDA: 53 MMHG (ref 64–87)
PO2 TEMP ADJ BLDA: 51 MMHG (ref 64–87)
POTASSIUM SERPL-SCNC: 3.6 MMOL/L (ref 3.6–5.5)
PROCALCITONIN SERPL-MCNC: 2.07 NG/ML
RBC # BLD AUTO: 2.93 M/UL (ref 4.2–5.4)
SAO2 % BLDA: 85 % (ref 93–99)
SIGNIFICANT IND 70042: NORMAL
SIGNIFICANT IND 70042: NORMAL
SITE SITE: NORMAL
SITE SITE: NORMAL
SODIUM SERPL-SCNC: 141 MMOL/L (ref 135–145)
SOURCE SOURCE: NORMAL
SOURCE SOURCE: NORMAL
SPECIMEN DRAWN FROM PATIENT: ABNORMAL
WBC # BLD AUTO: 14.8 K/UL (ref 4.8–10.8)

## 2017-06-24 PROCEDURE — 83735 ASSAY OF MAGNESIUM: CPT

## 2017-06-24 PROCEDURE — 94640 AIRWAY INHALATION TREATMENT: CPT

## 2017-06-24 PROCEDURE — 700101 HCHG RX REV CODE 250: Performed by: INTERNAL MEDICINE

## 2017-06-24 PROCEDURE — 700111 HCHG RX REV CODE 636 W/ 250 OVERRIDE (IP)

## 2017-06-24 PROCEDURE — 700105 HCHG RX REV CODE 258: Performed by: INTERNAL MEDICINE

## 2017-06-24 PROCEDURE — 82962 GLUCOSE BLOOD TEST: CPT | Mod: 91

## 2017-06-24 PROCEDURE — 700102 HCHG RX REV CODE 250 W/ 637 OVERRIDE(OP): Performed by: INTERNAL MEDICINE

## 2017-06-24 PROCEDURE — 80048 BASIC METABOLIC PNL TOTAL CA: CPT

## 2017-06-24 PROCEDURE — A9270 NON-COVERED ITEM OR SERVICE: HCPCS | Performed by: INTERNAL MEDICINE

## 2017-06-24 PROCEDURE — 71010 DX-CHEST-PORTABLE (1 VIEW): CPT

## 2017-06-24 PROCEDURE — 770022 HCHG ROOM/CARE - ICU (200)

## 2017-06-24 PROCEDURE — 85025 COMPLETE CBC W/AUTO DIFF WBC: CPT

## 2017-06-24 PROCEDURE — 84100 ASSAY OF PHOSPHORUS: CPT

## 2017-06-24 PROCEDURE — 82803 BLOOD GASES ANY COMBINATION: CPT

## 2017-06-24 PROCEDURE — 700105 HCHG RX REV CODE 258

## 2017-06-24 PROCEDURE — A9270 NON-COVERED ITEM OR SERVICE: HCPCS

## 2017-06-24 PROCEDURE — 99291 CRITICAL CARE FIRST HOUR: CPT | Performed by: INTERNAL MEDICINE

## 2017-06-24 PROCEDURE — 700111 HCHG RX REV CODE 636 W/ 250 OVERRIDE (IP): Performed by: INTERNAL MEDICINE

## 2017-06-24 PROCEDURE — 94003 VENT MGMT INPAT SUBQ DAY: CPT

## 2017-06-24 PROCEDURE — 700102 HCHG RX REV CODE 250 W/ 637 OVERRIDE(OP)

## 2017-06-24 PROCEDURE — 84145 PROCALCITONIN (PCT): CPT

## 2017-06-24 PROCEDURE — 36600 WITHDRAWAL OF ARTERIAL BLOOD: CPT

## 2017-06-24 RX ORDER — FAMOTIDINE 20 MG/1
20 TABLET, FILM COATED ORAL 2 TIMES DAILY
Status: DISCONTINUED | OUTPATIENT
Start: 2017-06-24 | End: 2017-06-28

## 2017-06-24 RX ORDER — METHYLPREDNISOLONE SODIUM SUCCINATE 125 MG/2ML
62.5 INJECTION, POWDER, LYOPHILIZED, FOR SOLUTION INTRAMUSCULAR; INTRAVENOUS EVERY 8 HOURS
Status: DISCONTINUED | OUTPATIENT
Start: 2017-06-24 | End: 2017-06-26

## 2017-06-24 RX ORDER — POTASSIUM CHLORIDE 1.5 G/1.58G
40 POWDER, FOR SOLUTION ORAL ONCE
Status: COMPLETED | OUTPATIENT
Start: 2017-06-24 | End: 2017-06-24

## 2017-06-24 RX ADMIN — PROPOFOL 40 MCG/KG/MIN: 10 INJECTION, EMULSION INTRAVENOUS at 06:46

## 2017-06-24 RX ADMIN — PROPOFOL 50 MCG/KG/MIN: 10 INJECTION, EMULSION INTRAVENOUS at 08:41

## 2017-06-24 RX ADMIN — DEXMEDETOMIDINE HYDROCHLORIDE 1.5 MCG/KG/HR: 100 INJECTION, SOLUTION INTRAVENOUS at 21:02

## 2017-06-24 RX ADMIN — INSULIN LISPRO 5 UNITS: 100 INJECTION, SOLUTION INTRAVENOUS; SUBCUTANEOUS at 12:05

## 2017-06-24 RX ADMIN — FENTANYL CITRATE 50 MCG: 50 INJECTION, SOLUTION INTRAMUSCULAR; INTRAVENOUS at 21:56

## 2017-06-24 RX ADMIN — POTASSIUM CHLORIDE 40 MEQ: 1.5 POWDER, FOR SOLUTION ORAL at 11:51

## 2017-06-24 RX ADMIN — DEXMEDETOMIDINE HYDROCHLORIDE 1.5 MCG/KG/HR: 100 INJECTION, SOLUTION INTRAVENOUS at 12:50

## 2017-06-24 RX ADMIN — METHYLPREDNISOLONE SODIUM SUCCINATE 62.5 MG: 125 INJECTION, POWDER, FOR SOLUTION INTRAMUSCULAR; INTRAVENOUS at 20:57

## 2017-06-24 RX ADMIN — CHLORHEXIDINE GLUCONATE 15 ML: 1.2 RINSE ORAL at 20:56

## 2017-06-24 RX ADMIN — HEPARIN SODIUM 5000 UNITS: 5000 INJECTION, SOLUTION INTRAVENOUS; SUBCUTANEOUS at 05:26

## 2017-06-24 RX ADMIN — PIPERACILLIN SODIUM AND TAZOBACTAM SODIUM 4.5 G: 4; .5 INJECTION, POWDER, FOR SOLUTION INTRAVENOUS at 05:21

## 2017-06-24 RX ADMIN — FAMOTIDINE 20 MG: 20 TABLET, FILM COATED ORAL at 08:41

## 2017-06-24 RX ADMIN — DEXMEDETOMIDINE HYDROCHLORIDE 1.5 MCG/KG/HR: 100 INJECTION, SOLUTION INTRAVENOUS at 18:26

## 2017-06-24 RX ADMIN — PROPOFOL 25 MCG/KG/MIN: 10 INJECTION, EMULSION INTRAVENOUS at 14:45

## 2017-06-24 RX ADMIN — PROPOFOL 30 MCG/KG/MIN: 10 INJECTION, EMULSION INTRAVENOUS at 02:43

## 2017-06-24 RX ADMIN — AMPICILLIN SODIUM AND SULBACTAM SODIUM 3 G: 2; 1 INJECTION, POWDER, FOR SOLUTION INTRAMUSCULAR; INTRAVENOUS at 17:57

## 2017-06-24 RX ADMIN — VANCOMYCIN HYDROCHLORIDE 2000 MG: 100 INJECTION, POWDER, LYOPHILIZED, FOR SOLUTION INTRAVENOUS at 08:41

## 2017-06-24 RX ADMIN — DEXMEDETOMIDINE HYDROCHLORIDE 1.5 MCG/KG/HR: 100 INJECTION, SOLUTION INTRAVENOUS at 19:43

## 2017-06-24 RX ADMIN — DEXMEDETOMIDINE HYDROCHLORIDE 1.5 MCG/KG/HR: 100 INJECTION, SOLUTION INTRAVENOUS at 14:08

## 2017-06-24 RX ADMIN — IPRATROPIUM BROMIDE AND ALBUTEROL SULFATE 3 ML: .5; 3 SOLUTION RESPIRATORY (INHALATION) at 10:33

## 2017-06-24 RX ADMIN — DEXMEDETOMIDINE HYDROCHLORIDE 1.5 MCG/KG/HR: 100 INJECTION, SOLUTION INTRAVENOUS at 15:43

## 2017-06-24 RX ADMIN — AMPICILLIN SODIUM AND SULBACTAM SODIUM 3 G: 2; 1 INJECTION, POWDER, FOR SOLUTION INTRAMUSCULAR; INTRAVENOUS at 23:48

## 2017-06-24 RX ADMIN — INSULIN GLARGINE 25 UNITS: 100 INJECTION, SOLUTION SUBCUTANEOUS at 07:40

## 2017-06-24 RX ADMIN — INSULIN LISPRO 6 UNITS: 100 INJECTION, SOLUTION INTRAVENOUS; SUBCUTANEOUS at 05:34

## 2017-06-24 RX ADMIN — DEXMEDETOMIDINE HYDROCHLORIDE 1.5 MCG/KG/HR: 100 INJECTION, SOLUTION INTRAVENOUS at 11:13

## 2017-06-24 RX ADMIN — STANDARDIZED SENNA CONCENTRATE AND DOCUSATE SODIUM 2 TABLET: 8.6; 5 TABLET, FILM COATED ORAL at 20:56

## 2017-06-24 RX ADMIN — INSULIN LISPRO 6 UNITS: 100 INJECTION, SOLUTION INTRAVENOUS; SUBCUTANEOUS at 18:02

## 2017-06-24 RX ADMIN — INSULIN LISPRO 5 UNITS: 100 INJECTION, SOLUTION INTRAVENOUS; SUBCUTANEOUS at 00:54

## 2017-06-24 RX ADMIN — DEXMEDETOMIDINE HYDROCHLORIDE 1.5 MCG/KG/HR: 100 INJECTION, SOLUTION INTRAVENOUS at 23:47

## 2017-06-24 RX ADMIN — INSULIN LISPRO 6 UNITS: 100 INJECTION, SOLUTION INTRAVENOUS; SUBCUTANEOUS at 23:51

## 2017-06-24 RX ADMIN — LEVOTHYROXINE SODIUM 75 MCG: 75 TABLET ORAL at 07:40

## 2017-06-24 RX ADMIN — MONTELUKAST SODIUM 10 MG: 10 TABLET, FILM COATED ORAL at 08:41

## 2017-06-24 RX ADMIN — PIPERACILLIN SODIUM AND TAZOBACTAM SODIUM 4.5 G: 4; .5 INJECTION, POWDER, FOR SOLUTION INTRAVENOUS at 00:49

## 2017-06-24 RX ADMIN — DEXMEDETOMIDINE HYDROCHLORIDE 1.5 MCG/KG/HR: 100 INJECTION, SOLUTION INTRAVENOUS at 22:20

## 2017-06-24 RX ADMIN — CHLORHEXIDINE GLUCONATE 15 ML: 1.2 RINSE ORAL at 08:41

## 2017-06-24 RX ADMIN — HYDROXYZINE HYDROCHLORIDE 50 MG: 50 TABLET, FILM COATED ORAL at 19:17

## 2017-06-24 RX ADMIN — HYDRALAZINE HYDROCHLORIDE 10 MG: 20 INJECTION INTRAMUSCULAR; INTRAVENOUS at 18:13

## 2017-06-24 RX ADMIN — DEXMEDETOMIDINE HYDROCHLORIDE 0.7 MCG/KG/HR: 100 INJECTION, SOLUTION INTRAVENOUS at 02:43

## 2017-06-24 RX ADMIN — DEXMEDETOMIDINE HYDROCHLORIDE 1.5 MCG/KG/HR: 100 INJECTION, SOLUTION INTRAVENOUS at 09:25

## 2017-06-24 RX ADMIN — LORAZEPAM 1 MG: 2 INJECTION INTRAMUSCULAR; INTRAVENOUS at 05:53

## 2017-06-24 RX ADMIN — IPRATROPIUM BROMIDE AND ALBUTEROL SULFATE 3 ML: .5; 3 SOLUTION RESPIRATORY (INHALATION) at 07:23

## 2017-06-24 RX ADMIN — HEPARIN SODIUM 5000 UNITS: 5000 INJECTION, SOLUTION INTRAVENOUS; SUBCUTANEOUS at 14:15

## 2017-06-24 RX ADMIN — DEXMEDETOMIDINE HYDROCHLORIDE 1.5 MCG/KG/HR: 100 INJECTION, SOLUTION INTRAVENOUS at 08:08

## 2017-06-24 RX ADMIN — METHYLPREDNISOLONE SODIUM SUCCINATE 62.5 MG: 125 INJECTION, POWDER, FOR SOLUTION INTRAMUSCULAR; INTRAVENOUS at 00:53

## 2017-06-24 RX ADMIN — LORAZEPAM 1 MG: 2 INJECTION INTRAMUSCULAR; INTRAVENOUS at 21:55

## 2017-06-24 RX ADMIN — IPRATROPIUM BROMIDE AND ALBUTEROL SULFATE 3 ML: .5; 3 SOLUTION RESPIRATORY (INHALATION) at 15:49

## 2017-06-24 RX ADMIN — METHYLPREDNISOLONE SODIUM SUCCINATE 62.5 MG: 125 INJECTION, POWDER, FOR SOLUTION INTRAMUSCULAR; INTRAVENOUS at 14:14

## 2017-06-24 RX ADMIN — HYDRALAZINE HYDROCHLORIDE 10 MG: 20 INJECTION INTRAMUSCULAR; INTRAVENOUS at 05:33

## 2017-06-24 RX ADMIN — HEPARIN SODIUM 5000 UNITS: 5000 INJECTION, SOLUTION INTRAVENOUS; SUBCUTANEOUS at 20:56

## 2017-06-24 RX ADMIN — AMPICILLIN SODIUM AND SULBACTAM SODIUM 3 G: 2; 1 INJECTION, POWDER, FOR SOLUTION INTRAMUSCULAR; INTRAVENOUS at 11:52

## 2017-06-24 RX ADMIN — IPRATROPIUM BROMIDE AND ALBUTEROL SULFATE 3 ML: .5; 3 SOLUTION RESPIRATORY (INHALATION) at 18:44

## 2017-06-24 RX ADMIN — DEXMEDETOMIDINE HYDROCHLORIDE 0.7 MCG/KG/HR: 100 INJECTION, SOLUTION INTRAVENOUS at 00:14

## 2017-06-24 RX ADMIN — DEXMEDETOMIDINE HYDROCHLORIDE 0.7 MCG/KG/HR: 100 INJECTION, SOLUTION INTRAVENOUS at 05:56

## 2017-06-24 RX ADMIN — FAMOTIDINE 20 MG: 20 TABLET, FILM COATED ORAL at 20:56

## 2017-06-24 RX ADMIN — FENTANYL CITRATE 100 MCG: 50 INJECTION, SOLUTION INTRAMUSCULAR; INTRAVENOUS at 16:27

## 2017-06-24 RX ADMIN — METHYLPREDNISOLONE SODIUM SUCCINATE 62.5 MG: 125 INJECTION, POWDER, FOR SOLUTION INTRAMUSCULAR; INTRAVENOUS at 05:22

## 2017-06-24 NOTE — PROGRESS NOTES
Pt follows and moves all throughout the shift. Had become agitated during the shift and clamped down on her ET tube, O2 saturation dropped to the 60's. Pt released et-tube once propofol restarted, RT placed bite-block, O2 saturation recovered back to the 90's. RN has been weaning propofol throughout the shift.

## 2017-06-24 NOTE — CARE PLAN
Problem: Respiratory:  Goal: Respiratory status will improve  Outcome: PROGRESSING SLOWER THAN EXPECTED  Pt hyperventilates on the vent, medicated per mar and therapeutic presence provided.

## 2017-06-24 NOTE — CARE PLAN
Problem: Ventilation Defect:  Goal: Ability to achieve and maintain unassisted ventilation or tolerate decreased levels of ventilator support  Intervention: Support and monitor invasive and noninvasive mechanical ventilation  Adult Ventilation Update    Total Vent Days: 4      Patient Lines/Drains/Airways Status    Active Airway      Name: Placement date: Placement time: Site: Days:     Airway Group ET Tube Oral 8.0 06/21/17  2245  Oral  2                 Static Compliance (ml / cm H2O): 179 (06/24/17 1549)    Sputum/Suction   Cough: Strong (06/24/17 1600)  Sputum Amount: Small (06/24/17 1600)  Sputum Color: Clear;White (06/24/17 1600)  Sputum Consistency: Thin (06/24/17 1600)    Events/Summary/Plan: Report received, orders reviewed. (06/24/17 0255) No changes made to vent settings today. No SBTs today.

## 2017-06-24 NOTE — PROGRESS NOTES
Pulmonary Critical Care Progress Note      Date of service: 6/24/2017    Chief Complaint: SOB    History of Present Illness: This is a 66-year-old lady who has a history of chronic hypoxemic respiratory failure and chronic obstructive pulmonary disease.  She was hospitalized at [facility] from on or about May 7th to 05/10/2017, with an ankle fracture.  She was then readmitted to [facility] from on or about June 2nd to 06/09/2017, with ventilator dependent respiratory failure and healthcare-associated pneumonia.  She was subsequently sent to a skilled nursing facility.  She now comes back to the emergency room this evening because of increasing shortness of breath.  She was brought in here with wheezing and coarse crackles.  She was in respiratory distress.  She was intubated.  She is now on full mechanical ventilatory support.  Once again, she has been more residing at a skilled nursing facility.    ROS:  Respiratory: unable to perform due to the patient's inability to effectively communicate, Cardiac: unable to perform due to the patient's inability to effectively communicate, GI: unable to perform due to the patient's inability to effectively communicate.  All other systems negative.      Interval Events:  24 hour interval history reviewed   Tmax 99.3  -740 cc over the last 24 hours, -2.4L since admit   CXR shows: increasing interstitial edema, right greater than left, bibasilar atelectasis.   Very agitated overnight with desaturation into the 60s and restarted on Propofol.  CVP 16. SR,  systolic.  BM last night. Adequate UOP.  No SBT this AM. Small amount of white secretions.   PCT elevated.       PFSH:  No change.      Physical Exam:  General:  Sedate, slightly restless.   HEENT:  NC, atraumatic, PERRL.  CVS:  RRR.  Respiratory:  Clear bilaterally with mild rhonchi to the right base.   Abdomen:  Soft, NT. Positive bowel sounds.   Extremities:  No significant edema.   Neuro/Psych: Follows simple commands to verbal  stimuli.   Skin: Superficial bruising.       Respiratory:  [place]ard Vent Mode: APVCMV, Rate (breaths/min): 20, Vt Target (mL): 330, PEEP/CPAP: 8, FiO2: 30, Static Compliance (ml / cm H2O): 74, Control VTE (exp VT): 352  Pulse Oximetry: (!) 87 %  ImagingAvailable data reviewed     Recent Labs      06/22/17   0409  06/23/17   0417  06/24/17   0440   ISTATAPH  7.253*  7.350*  7.355*   ISTATAPCO2  48.0*  44.0*  38.7*   ISTATAPO2  76  79  53*   ISTATATCO2  23  26  23   AGNPLAW7NSR  92*  95  85*   ISTATARTHCO3  21.2  24.3  21.6   ISTATARTBE  -6*  -1  -4   ISTATTEMP  99.0 F  97.2 F  97.7 F   ISTATFIO2  60  35  30   ISTATSPEC  Arterial  Arterial  Arterial   ISTATAPHTC  7.250*  7.362*  7.363*   [id number]  77  75  51*     HemoDynamics:  Pulse: 60, Heart Rate (Monitored): 61  Blood Pressure : 146/63 mmHg, NIBP: 133/63 mmHg  CVP (mm Hg): (!) 14 MM HG  Imaging: Available data reviewed     Recent Labs      06/21/17   2240  06/22/17   1150   TROPONINI  0.02   --    BNPBTYPENAT   --   1186*     Neuro:  GCS Total Jackson Coma Score: 11  Imaging: Available data reviewed      Fluids:  Intake/Output       06/22/17 0700 - 06/23/17 0659 06/23/17 0700 - 06/24/17 0659 06/24/17 0700 - 06/25/17 0659      0209-8296 3200-4521 Total 8495-1420 1226-2792 Total 6176-5050 0169-2818 Total       Intake    I.V.  404.4  200.5 604.9  137.2  465.5 602.7  --  -- --    Fentanyl Volume 0 0 0 -- -- -- -- -- --    Precedex Volume -- -- -- 137.2 218.4 355.6 -- -- --    Vasopressin Volume 0 -- 0 -- -- -- -- -- --    Propofol Volume 259.8 200.5 460.3 -- 247.1 247.1 -- -- --    Norepinephrine Volume 144.6 0 144.6 -- -- -- -- -- --    Blood  --  -- --  300  -- 300  --  -- --    Volume (RELEASE RED BLOOD CELLS) -- -- -- 300 -- 300 -- -- --    Other  30  30 60  30  -- 30  --  -- --    Medications (P.O./ Enteral Liquids) 30 30 60 30 -- 30 -- -- --    Enteral  395  570 965  680  570 1250  --  -- --    Enteral Volume 275 540 815  -- -- --    Free Water /  Tube Flush 120 30 150 90 -- 90 -- -- --    Total Intake 829.4 800.5 1629.9 1147.2 1035.5 2182.7 -- -- --       Output    Urine  1250  1400 2650  1300  1625 2925  --  -- --    Indwelling Cathether 1250 1400 2650 1300 1625 2925 -- -- --    Stool  --  -- --  --  -- --  --  -- --    Number of Times Stooled 1 x 1 x 2 x 1 x 1 x 2 x -- -- --    Total Output 1250 1400 2650 1300 1625 2925 -- -- --       Net I/O     -420.6 -599.5 -1020.1 -152.8 -589.5 -742.3 -- -- --           Recent Labs      17   1150  17   0502  17   0540   SODIUM  136  141  141   POTASSIUM  4.7  4.0  3.6   CHLORIDE  106  109  111   CO2  22  23  22   BUN  34*  34*  46*   CREATININE  1.56*  1.23  1.20   MAGNESIUM   --   2.1  2.1   PHOSPHORUS   --   3.7  3.3   CALCIUM  8.6  8.5  8.8     GI/Nutrition:  Imaging: Available data reviewed  NPO       Liver Function  Recent Labs      17   1150  17   0502  17   0540   ALTSGPT  15   --    --    --    ASTSGOT  16   --    --    --    ALKPHOSPHAT  59   --    --    --    TBILIRUBIN  0.8   --    --    --    GLUCOSE  179*  351*  269*  310*     Heme:  Recent Labs      17   2240   17   0500  17   1440  17   0540   RBC  3.03*   < >  2.40*  2.84*  2.93*   HEMOGLOBIN  8.6*   < >  6.7*  8.1*  8.3*   HEMATOCRIT  29.0*   < >  22.3*  26.0*  27.2*   PLATELETCT  461*   < >  189  209  210   PROTHROMBTM  14.4   --    --    --    --    APTT  34.3   --    --    --    --    INR  1.09   --    --    --    --     < > = values in this interval not displayed.     Infectious Disease:  Temp  Av.7 °C (98.1 °F)  Min: 36.7 °C (98.1 °F)  Max: 36.7 °C (98.1 °F)  Monitored Temp  Av.7 °C (98.1 °F)  Min: 36.1 °C (97 °F)  Max: 37.4 °C (99.3 °F)  Micro: cultures reviewed     Recent Labs      17   2240   17   0500  17   1440  17   0540   WBC  24.6*   < >  15.4*  16.4*  14.8*   NEUTSPOLYS  86.80*   < >  95.50*  95.30*  95.90*   LYMPHOCYTES  4.80*    < >  1.50*  1.20*  1.60*   MONOCYTES  4.50   < >  2.00  1.70  1.30   EOSINOPHILS  1.80   < >  0.00  0.00  0.00   BASOPHILS  0.60   < >  0.10  0.10  0.10   ASTSGOT  16   --    --    --    --    ALTSGPT  15   --    --    --    --    ALKPHOSPHAT  59   --    --    --    --    TBILIRUBIN  0.8   --    --    --    --     < > = values in this interval not displayed.     Current Facility-Administered Medications   Medication Dose Frequency Provider Last Rate Last Dose   • insulin glargine (LANTUS) injection 25 Units  25 Units QAM INSULIN William Hope M.D.       • dexmedetomidine (PRECEDEX) 200 mcg in NS 50 mL infusion  0.1-0.7 mcg/kg/hr Continuous William Hope M.D. 18.2 mL/hr at 06/24/17 0556 0.7 mcg/kg/hr at 06/24/17 0556   • vancomycin 2,000 mg in  mL IVPB  2,000 mg DAILY Asiya Villavicencio, PHARMD   Stopped at 06/23/17 1300   • hydrALAZINE (APRESOLINE) injection 10 mg  10 mg Q4HRS PRN Carito Richards M.D.   10 mg at 06/24/17 0533   • hydrOXYzine (ATARAX) tablet 50 mg  50 mg TID PRN Carito Richards M.D.       • lorazepam (ATIVAN) injection 1 mg  1 mg PRN Carito Richards M.D.   1 mg at 06/24/17 0553   • Respiratory Care per Protocol   Continuous RT Angel Lema M.D.       • senna-docusate (PERICOLACE or SENOKOT S) 8.6-50 MG per tablet 2 Tab  2 Tab BID Angel Lema M.D.   Stopped at 06/23/17 2100    And   • polyethylene glycol/lytes (MIRALAX) PACKET 1 Packet  1 Packet QDAY PRN Angel Lema M.D.        And   • magnesium hydroxide (MILK OF MAGNESIA) suspension 30 mL  30 mL QDAY PRN Angel Lema M.D.        And   • bisacodyl (DULCOLAX) suppository 10 mg  10 mg QDAY PRN Angel Lema M.D.       • chlorhexidine (PERIDEX) 0.12 % solution 15 mL  15 mL BID Angel Lema M.D.   15 mL at 06/23/17 2107   • lidocaine (XYLOCAINE) 1%  injection  1-2 mL Q30 MIN PRN Angel Lema M.D.   2 mL at 06/23/17 0817   • MD ALERT...Adult ICU  Electrolyte Replacement per Pharmacy Protocol   pharmacy to dose Angel Lema M.D.       • fentaNYL (SUBLIMAZE) injection 25 mcg  25 mcg Q HOUR PRN Angel Lema M.D.        Or   • fentaNYL (SUBLIMAZE) injection 50 mcg  50 mcg Q HOUR PRN Angel Lema M.D.   50 mcg at 06/23/17 0750    Or   • fentaNYL (SUBLIMAZE) injection 100 mcg  100 mcg Q HOUR PRN Angel Lema M.D.   100 mcg at 06/23/17 2328   • ipratropium-albuterol (DUONEB) nebulizer solution 3 mL  3 mL Q2HRS PRN (RT) Angel Lema M.D.       • famotidine (PEPCID) tablet 20 mg  20 mg DAILY Angel Lema M.D.   20 mg at 06/23/17 0803   • insulin lispro (HUMALOG) injection 2-9 Units  2-9 Units Q6HRS Angel Lema M.D.   6 Units at 06/24/17 0534   • glucose 4 g chewable tablet 16 g  16 g Q15 MIN PRN Angel Lema M.D.        And   • dextrose 50% (D50W) injection 25 mL  25 mL Q15 MIN PRN Angel Lema M.D.       • propofol (DIPRIVAN) injection  5-80 mcg/kg/min Continuous Angel Lema M.D. 6.2 mL/hr at 06/24/17 0558 10 mcg/kg/min at 06/24/17 0558   • heparin injection 5,000 Units  5,000 Units Q8HRS Angel Lema M.D.   5,000 Units at 06/24/17 0526   • MD ALERT... vancomycin per pharmacy protocol   pharmacy to dose Angel Lema M.D.       • piperacillin-tazobactam (ZOSYN) 4.5 g in  mL IVPB  4.5 g Q6HRS Angel Lema M.D. 100 mL/hr at 06/24/17 0521 4.5 g at 06/24/17 0521   • norepinephrine (LEVOPHED) 8 mg in  mL Infusion  0.5-30 mcg/min Continuous Angel Lema M.D.   Stopped at 06/22/17 1530    And   • vasopressin (VASOSTRICT) 20 Units in  mL Infusion  0.03 Units/min Continuous Angel Lema M.D.   Stopped at 06/22/17 0115   • levothyroxine (SYNTHROID) tablet 75 mcg  75 mcg AM ES Carito Richards M.D.   75 mcg at 06/23/17 0617   • montelukast (SINGULAIR) tablet 10 mg  10 mg DAILY Carito Richards M.D.    10 mg at 06/23/17 0803   • acetaminophen (TYLENOL) tablet 650 mg  650 mg Q6HRS PRN Carito Richards M.D.       • methylPREDNISolone sod succ (SOLU-MEDROL) 125 MG injection 62.5 mg  62.5 mg Q6HRS Carito Richards M.D.   62.5 mg at 06/24/17 0522   • ipratropium-albuterol (DUONEB) nebulizer solution 3 mL  3 mL 4X/DAY (RT) Angel Lema M.D.   3 mL at 06/23/17 1854     Last reviewed on 6/22/2017  2:52 PM by Lucinda Alcantara, [facility]    Quality  Measures:  Labs reviewed, Medications reviewed and Radiology images reviewed                    Lines:  Right-sided PICC    Gtts:  Fentanyl off  Levo off  Propofol 40  Dex 0.7     Abx:  Day 4 Vancomycin  Day 4 Zosyn    Solumedrol 62.5 q6    Rcx 6/19 negative  BAL 6/22 negative   Ucx 6/22 negatice   bcx 6/22 negative     Echo 3/17 EF 60%, RVSP 60        Assessment/Plan  -  Ventilator-dependent respiratory failure.   - rt/02 protocol   - full vent support   - daily sat/sbt   - steroids/bds   - empiric abx   - f/u cultures  -  Acute exacerbation of chronic obstructive pulmonary disease.   - bds, steriods   - empiric abx  -  Severe sepsis, pulmonary source.   - sepsis protocol   - levo off   - goal cvp 10-12   - procalcitonin 2.9   - f/u cultures and continue abx  -  Health-care-associated pneumonia.   - as above    - on vanco/zosyn  -  Anemia   - no obvious source of loss   - 1u prbcs 6/23  -  Possible coexisting acute pulmonary edema secondary to acute on chronic    diastolic heart failure.   - monitor volume balance   - continue resuscitation while on pressors and low map  -  Hyperglycemia - IDDM + steroids   - lantus (home dose 22u)   - ssi   -  Pulmonary HTN w RVSP of 60    -  Obstructive sleep apnea.  -  Gastroesophageal reflux disease.  -  Hepatitis C.  -  History of systemic arterial hypertension.  -  Diabetes mellitus type 2.  -  Chronic kidney disease.  -  History of atrial fibrillation.  -  Fibromyalgia.  -  Post-traumatic stress disorder.  -   Hyponatremia.  -  Hyperkalemia.  -  Recent ankle fracture in May of this year.    Discussed patient condition and risk of morbidity and/or mortality with RN, RT, Therapies, Pharmacy, Charge nurse / hot rounds and QA team.    The patient remains critically ill.  Critical care time = 32 minutes in directly providing and coordinating critical care and extensive data review.  No time overlap and excludes procedures.    Rayne CLAY (Alla), am scribing for, and in the presence of, William Hope M.D.   Electronically signed by: Rayne Mayorga (Alla), 6/24/2017  William CLAY M.D. personally performed the services described in this documentation, as scribed by Rayne Mayorga in my presence, and it is both accurate and complete.

## 2017-06-24 NOTE — CARE PLAN
Problem: Safety  Goal: Will remain free from injury  Outcome: PROGRESSING SLOWER THAN EXPECTED  Pt educated on use of restraints to prevent injury. Bite block placed to prev

## 2017-06-24 NOTE — CARE PLAN
Problem: Bowel/Gastric:  Goal: Normal bowel function is maintained or improved  Outcome: PROGRESSING AS EXPECTED  Stool softener held due to 2 loose stools last night per chart. WIll resume when appropriate.     Problem: Respiratory:  Goal: Respiratory status will improve  Outcome: PROGRESSING AS EXPECTED  Patient on vent, APVCMV 20 330 8 40%. Patient sating at 93%.

## 2017-06-24 NOTE — PROGRESS NOTES
[facility] Progress Note               Author: Emmett Lyons M.D. PGY3 Date & Time created: 6/24/2017  7:35 AM     Interval History:  06/22/2017: Remains on vent, starting tube feeds.  Bronch'd today with tan secretions.  Cultures NGTD.  Continue current ABX.  Hyperkalemia resolved.  Renal function stable.  Start lantus 15u for elevated sugars.      06/23/17 : No significant overnight events. She is on Vent support /20/8/35% FiO2 Her ABG today 7.35/44/79/24.3 getting better. On Propofol , Pressors and TF @45.  Recieving Abx Zosyn. Her HBG was 6.7 in AM , 1U PRBC given. Will recheck at 4.30 PM. SBT trials, Mobilization and plans of Extubation if she tolerates SBT.    06/24/17: Agitated overnight, biting on tube, bite guard placed, O2 improved.  Good UOP, CVP: 17.  Repleted electrolytes.  Cultures negative, de-escalate ABX, stop vanco/zosyn, start unasyn.  DC PICC line.  Repeat procalcitonin.  Reduce steroid frequency from Q6H to Q8H.    Sugars uncontrolled, Lantus 25u QAM.  Tolerated 2 hours SBT yesterday, no SBT yet today.  Possibly extubate today.      Disposition: ICU.      Review of Systems:  Review of Systems   Unable to perform ROS: intubated       Physical Exam  Constitutional: She is well-developed, well-nourished, intubated, non-diaphoretic.   HENT:    Head: Normocephalic and atraumatic.    Eyes: Pupils are equal, round, and reactive to light.   Neck: Neck supple. No JVD present. No tracheal deviation present. No thyromegaly present.   Cardiovascular: Normal rate, regular rhythm and normal heart sounds.  Exam reveals no gallop and no friction rub.     No murmur heard.  Pulmonary/Chest: She has no wheezes or rales.  Rare rhonci.  Diminished breath sounds.   Musculoskeletal: She exhibits edema.   Lymphadenopathy:   She has no cervical adenopathy.   Neurological: Sedated, intubated.  Withdraws to pain, does not follow commands.   Skin: Skin is warm and dry. She is not diaphoretic.     Labs:  Recent Results  (from the past 24 hour(s))   ACCU-CHEK GLUCOSE    Collection Time: 06/23/17 11:56 AM   Result Value Ref Range    Glucose - Accu-Ck 265 (H) 65 - 99 mg/dL   CBC WITH DIFFERENTIAL    Collection Time: 06/23/17  2:40 PM   Result Value Ref Range    WBC 16.4 (H) 4.8 - 10.8 K/uL    RBC 2.84 (L) 4.20 - 5.40 M/uL    Hemoglobin 8.1 (L) 12.0 - 16.0 g/dL    Hematocrit 26.0 (L) 37.0 - 47.0 %    MCV 91.5 81.4 - 97.8 fL    MCH 28.5 27.0 - 33.0 pg    MCHC 31.2 (L) 33.6 - 35.0 g/dL    RDW 57.1 (H) 35.9 - 50.0 fL    Platelet Count 209 164 - 446 K/uL    MPV 9.2 9.0 - 12.9 fL    Neutrophils-Polys 95.30 (H) 44.00 - 72.00 %    Lymphocytes 1.20 (L) 22.00 - 41.00 %    Monocytes 1.70 0.00 - 13.40 %    Eosinophils 0.00 0.00 - 6.90 %    Basophils 0.10 0.00 - 1.80 %    Immature Granulocytes 1.70 (H) 0.00 - 0.90 %    Nucleated RBC 0.20 /100 WBC    Neutrophils (Absolute) 15.67 (H) 2.00 - 7.15 K/uL    Lymphs (Absolute) 0.19 (L) 1.00 - 4.80 K/uL    Monos (Absolute) 0.28 0.00 - 0.85 K/uL    Eos (Absolute) 0.00 0.00 - 0.51 K/uL    Baso (Absolute) 0.01 0.00 - 0.12 K/uL    Immature Granulocytes (abs) 0.28 (H) 0.00 - 0.11 K/uL    NRBC (Absolute) 0.03 K/uL   ACCU-CHEK GLUCOSE    Collection Time: 06/23/17  6:17 PM   Result Value Ref Range    Glucose - Accu-Ck 300 (H) 65 - 99 mg/dL   ACCU-CHEK GLUCOSE    Collection Time: 06/24/17 12:53 AM   Result Value Ref Range    Glucose - Accu-Ck 279 (H) 65 - 99 mg/dL   ISTAT ARTERIAL BLOOD GAS    Collection Time: 06/24/17  4:40 AM   Result Value Ref Range    Ph 7.355 (L) 7.400 - 7.500    Pco2 38.7 (H) 26.0 - 37.0 mmHg    Po2 53 (L) 64 - 87 mmHg    Tco2 23 20 - 33 mmol/L    S02 85 (L) 93 - 99 %    Hco3 21.6 17.0 - 25.0 mmol/L    BE -4 -4 - 3 mmol/L    Body Temp 97.7 F degrees    O2 Therapy 30 %    Ph Temp Mariola 7.363 (L) 7.400 - 7.500    Pco2 Temp Co 37.8 (H) 26.0 - 37.0 mmHg    Po2 Temp Cor 51 (L) 64 - 87 mmHg    Specimen Arterial    ACCU-CHEK GLUCOSE    Collection Time: 06/24/17  5:32 AM   Result Value Ref Range     Glucose - Accu-Ck 316 (H) 65 - 99 mg/dL   CBC with Differential    Collection Time: 17  5:40 AM   Result Value Ref Range    WBC 14.8 (H) 4.8 - 10.8 K/uL    RBC 2.93 (L) 4.20 - 5.40 M/uL    Hemoglobin 8.3 (L) 12.0 - 16.0 g/dL    Hematocrit 27.2 (L) 37.0 - 47.0 %    MCV 92.8 81.4 - 97.8 fL    MCH 28.3 27.0 - 33.0 pg    MCHC 30.5 (L) 33.6 - 35.0 g/dL    RDW 59.7 (H) 35.9 - 50.0 fL    Platelet Count 210 164 - 446 K/uL    MPV 9.1 9.0 - 12.9 fL    Neutrophils-Polys 95.90 (H) 44.00 - 72.00 %    Lymphocytes 1.60 (L) 22.00 - 41.00 %    Monocytes 1.30 0.00 - 13.40 %    Eosinophils 0.00 0.00 - 6.90 %    Basophils 0.10 0.00 - 1.80 %    Immature Granulocytes 1.10 (H) 0.00 - 0.90 %    Nucleated RBC 0.20 /100 WBC    Neutrophils (Absolute) 14.22 (H) 2.00 - 7.15 K/uL    Lymphs (Absolute) 0.23 (L) 1.00 - 4.80 K/uL    Monos (Absolute) 0.20 0.00 - 0.85 K/uL    Eos (Absolute) 0.00 0.00 - 0.51 K/uL    Baso (Absolute) 0.01 0.00 - 0.12 K/uL    Immature Granulocytes (abs) 0.17 (H) 0.00 - 0.11 K/uL    NRBC (Absolute) 0.03 K/uL   Basic Metabolic Panel (BMP)    Collection Time: 17  5:40 AM   Result Value Ref Range    Sodium 141 135 - 145 mmol/L    Potassium 3.6 3.6 - 5.5 mmol/L    Chloride 111 96 - 112 mmol/L    Co2 22 20 - 33 mmol/L    Glucose 310 (H) 65 - 99 mg/dL    Bun 46 (H) 8 - 22 mg/dL    Creatinine 1.20 0.50 - 1.40 mg/dL    Calcium 8.8 8.5 - 10.5 mg/dL    Anion Gap 8.0 0.0 - 11.9   Magnesium    Collection Time: 17  5:40 AM   Result Value Ref Range    Magnesium 2.1 1.5 - 2.5 mg/dL   Phosphorus    Collection Time: 17  5:40 AM   Result Value Ref Range    Phosphorus 3.3 2.5 - 4.5 mg/dL   ESTIMATED GFR    Collection Time: 17  5:40 AM   Result Value Ref Range    GFR If  54 (A) >60 mL/min/1.73 m 2    GFR If Non African American 45 (A) >60 mL/min/1.73 m 2       Hemodynamics:  Temp (24hrs), Av.7 °C (98.1 °F), Min:36.7 °C (98.1 °F), Max:36.7 °C (98.1 °F)  Temperature: 36.7 °C (98.1 °F), Monitored  Temp: 37 °C (98.6 °F)  Pulse  Av  Min: 52  Max: 92Heart Rate (Monitored): 65  Blood Pressure : 146/63 mmHg, NIBP: 139/67 mmHg  CVP (mm Hg): (!) 18 MM HG  Respiratory:  Ballard Vent Mode: APVCMV, Rate (breaths/min): 20, PEEP/CPAP: 8, FiO2: 40, P Peak (PIP): 21, P MEAN: 10 Respiration: (!) 64, Pulse Oximetry: 97 %     Work Of Breathing / Effort: Vented  RUL Breath Sounds: Rhonchi, RML Breath Sounds: Rhonchi, RLL Breath Sounds: Diminished, BIRD Breath Sounds: Rhonchi, LLL Breath Sounds: Diminished  Fluids:    Intake/Output Summary (Last 24 hours) at 17 1743  Last data filed at 17 1400   Gross per 24 hour   Intake 643.69 ml   Output   1600 ml   Net -956.31 ml        GI/Nutrition:  Orders Placed This Encounter   Procedures   • Diet NPO     Standing Status: Standing      Number of Occurrences: 1      Standing Expiration Date:      Order Specific Question:  Restrict to:     Answer:  With Tube Feed [4]     Medications:  Current Facility-Administered Medications   Medication Last Dose   • insulin glargine (LANTUS) injection 25 Units     • dexmedetomidine (PRECEDEX) 200 mcg in NS 50 mL infusion 0.7 mcg/kg/hr at 17 0556   • vancomycin 2,000 mg in  mL IVPB Stopped at 17 1300   • hydrALAZINE (APRESOLINE) injection 10 mg 10 mg at 17 0533   • hydrOXYzine (ATARAX) tablet 50 mg     • lorazepam (ATIVAN) injection 1 mg 1 mg at 17 0553   • Respiratory Care per Protocol     • senna-docusate (PERICOLACE or SENOKOT S) 8.6-50 MG per tablet 2 Tab Stopped at 17 2100    And   • polyethylene glycol/lytes (MIRALAX) PACKET 1 Packet      And   • magnesium hydroxide (MILK OF MAGNESIA) suspension 30 mL      And   • bisacodyl (DULCOLAX) suppository 10 mg     • chlorhexidine (PERIDEX) 0.12 % solution 15 mL 15 mL at 17 2107   • lidocaine (XYLOCAINE) 1%  injection 2 mL at 17 9230   • MD ALERT...Adult ICU Electrolyte Replacement per Pharmacy Protocol     • fentaNYL (SUBLIMAZE) injection 25  mcg      Or   • fentaNYL (SUBLIMAZE) injection 50 mcg 50 mcg at 06/23/17 0750    Or   • fentaNYL (SUBLIMAZE) injection 100 mcg 100 mcg at 06/23/17 2328   • ipratropium-albuterol (DUONEB) nebulizer solution 3 mL     • famotidine (PEPCID) tablet 20 mg 20 mg at 06/23/17 0803   • insulin lispro (HUMALOG) injection 2-9 Units 6 Units at 06/24/17 0534   • glucose 4 g chewable tablet 16 g      And   • dextrose 50% (D50W) injection 25 mL     • propofol (DIPRIVAN) injection 50 mcg/kg/min at 06/24/17 0729   • heparin injection 5,000 Units 5,000 Units at 06/24/17 0526   • MD ALERT... vancomycin per pharmacy protocol     • piperacillin-tazobactam (ZOSYN) 4.5 g in  mL IVPB Stopped at 06/24/17 0621   • norepinephrine (LEVOPHED) 8 mg in  mL Infusion Stopped at 06/22/17 1530    And   • vasopressin (VASOSTRICT) 20 Units in  mL Infusion Stopped at 06/22/17 0115   • levothyroxine (SYNTHROID) tablet 75 mcg 75 mcg at 06/23/17 0617   • montelukast (SINGULAIR) tablet 10 mg 10 mg at 06/23/17 0803   • acetaminophen (TYLENOL) tablet 650 mg     • methylPREDNISolone sod succ (SOLU-MEDROL) 125 MG injection 62.5 mg 62.5 mg at 06/24/17 0522   • ipratropium-albuterol (DUONEB) nebulizer solution 3 mL 3 mL at 06/24/17 0723     Medical Decision Making, by Problem:  Active Hospital Problems    Diagnosis   • Acute respiratory failure requiring reintubation (CMS-HCC) [J96.00]     Quality  Measures:  EKG reviewed, Labs reviewed, Medications reviewed and Radiology images reviewed  Mckeon catheter: Critically Ill - Requiring Accurate Measurement of Urinary Output  Central line in place: Sepsis and Vasopressors    DVT Prophylaxis: Heparin    Ulcer prophylaxis: Yes  Antibiotics: Treating active infection/contamination beyond 24 hours perioperative coverage  Assessed for rehab: Patient unable to tolerate rehabilitation therapeutic regimen    Plan:    ACUTE HYPOXIC RESPIRATORY FAILURE  CHRONIC RESPIRATORY FAILURE  AECOPD  - CXR shows worsened  bilateral LL infiltrates; echo normal in 3/2017; LE US neg for DVT in 6/2017; CTPE neg in 6/2017; echo in 2015 shows normal EF with G2 diastolic dysfunction and pulmonary htn;   - likely 2/2 COPD exacerbation 2/2 HCAP  - intubated and sedated   - SBT, possibly extubate today (06/24/17)  - Bronch'd  - Follow cultures, NGTD  - ABX: Vanco/Zosyn --> de-escalate to Unasyn (06/24/17)  - RT protocol; solumedrol (decreased frequency from Q6H to Q8H 06/24/17)      SEPSIS 2/2 RESPIRATORY SOURCE  HCAP  LEUKOCYTOSIS  - Risk factors:  she has had multiple recent hosptial admissions and is from nursing home.  - Cultures from prior admission all neg; UA neg; CXR shows worsened infiltrates;   - BCx and respiratory cultures negative.  - IVF's and broad spectrum abx (vanco/zosyn)    - De-escalated ABX to Unasyn (06/24/17)  - Repeat procalcitonin ordered.      CHRONIC NORMOCYTIC ANEMIA  - h/h stable since last discharge      HYPERKALEMIA   - insulin given in ER --> resolved on repeat chemistries.       CKD-3   - stable at baseline; avoid nephrotoxins      DM2  - A1C: 5.2, query if getting hypoglycemic while outpatient  - ISS with Accuchecks; hypoglycemia protocol  - Lantus 25u      HTN   - Holding all home meds   - Resume as BP permits.  - Pressors to maintain MAP   - Not required pressors since 06/22/17.      CHRONIC HEP C   - stable      FIBROMYALGIA   - hold home pain meds      ASTHMA   - continue monteleukast      HYPOTHYROIDISM   - continue home synthroid 75mcg PO QD          PROCEDURES  - Bronchoscopy: 06/21/17  - Endotracheal intubation : 06/21/17  - RIJ Central venous catheter: 06/21/17      IMAGING  DX-CHEST-PORTABLE (1 VIEW)   Final Result      Stable chest appearance compared with 6/23.      DX-CHEST-PORTABLE (1 VIEW)   Final Result      Stable chest appearance compared with 6/22.      DX-ABDOMEN FOR TUBE PLACEMENT   Final Result      Feeding tube and nasogastric tube in place as noted above.      DX-CHEST-PORTABLE (1 VIEW)    Final Result      Right central venous line in place, with no pneumothorax. Improving pulmonary edema.      DX-CHEST-PORTABLE (1 VIEW)   Final Result      Mid and lower zone opacities, most consistent with pulmonary edema, overall worse compared with 6/13/2017.

## 2017-06-25 ENCOUNTER — APPOINTMENT (OUTPATIENT)
Dept: RADIOLOGY | Facility: MEDICAL CENTER | Age: 67
DRG: 870 | End: 2017-06-25
Attending: INTERNAL MEDICINE
Payer: MEDICARE

## 2017-06-25 LAB
ANION GAP SERPL CALC-SCNC: 9 MMOL/L (ref 0–11.9)
BASE EXCESS BLDA CALC-SCNC: -3 MMOL/L (ref -4–3)
BASOPHILS # BLD AUTO: 0.1 % (ref 0–1.8)
BASOPHILS # BLD: 0.01 K/UL (ref 0–0.12)
BODY TEMPERATURE: ABNORMAL DEGREES
BUN SERPL-MCNC: 54 MG/DL (ref 8–22)
CALCIUM SERPL-MCNC: 9.1 MG/DL (ref 8.5–10.5)
CHLORIDE SERPL-SCNC: 115 MMOL/L (ref 96–112)
CO2 BLDA-SCNC: 23 MMOL/L (ref 20–33)
CO2 SERPL-SCNC: 22 MMOL/L (ref 20–33)
CREAT SERPL-MCNC: 1.05 MG/DL (ref 0.5–1.4)
EOSINOPHIL # BLD AUTO: 0 K/UL (ref 0–0.51)
EOSINOPHIL NFR BLD: 0 % (ref 0–6.9)
ERYTHROCYTE [DISTWIDTH] IN BLOOD BY AUTOMATED COUNT: 61 FL (ref 35.9–50)
GFR SERPL CREATININE-BSD FRML MDRD: 52 ML/MIN/1.73 M 2
GLUCOSE BLD-MCNC: 148 MG/DL (ref 65–99)
GLUCOSE BLD-MCNC: 170 MG/DL (ref 65–99)
GLUCOSE BLD-MCNC: 234 MG/DL (ref 65–99)
GLUCOSE BLD-MCNC: 315 MG/DL (ref 65–99)
GLUCOSE SERPL-MCNC: 162 MG/DL (ref 65–99)
HCO3 BLDA-SCNC: 21.9 MMOL/L (ref 17–25)
HCT VFR BLD AUTO: 27.9 % (ref 37–47)
HGB BLD-MCNC: 8.4 G/DL (ref 12–16)
IMM GRANULOCYTES # BLD AUTO: 0.26 K/UL (ref 0–0.11)
IMM GRANULOCYTES NFR BLD AUTO: 2 % (ref 0–0.9)
LYMPHOCYTES # BLD AUTO: 0.36 K/UL (ref 1–4.8)
LYMPHOCYTES NFR BLD: 2.7 % (ref 22–41)
MAGNESIUM SERPL-MCNC: 2.2 MG/DL (ref 1.5–2.5)
MCH RBC QN AUTO: 28.2 PG (ref 27–33)
MCHC RBC AUTO-ENTMCNC: 30.1 G/DL (ref 33.6–35)
MCV RBC AUTO: 93.6 FL (ref 81.4–97.8)
MONOCYTES # BLD AUTO: 0.42 K/UL (ref 0–0.85)
MONOCYTES NFR BLD AUTO: 3.2 % (ref 0–13.4)
NEUTROPHILS # BLD AUTO: 12.28 K/UL (ref 2–7.15)
NEUTROPHILS NFR BLD: 92 % (ref 44–72)
NRBC # BLD AUTO: 0.02 K/UL
NRBC BLD AUTO-RTO: 0.2 /100 WBC
O2/TOTAL GAS SETTING VFR VENT: 40 %
PCO2 BLDA: 36 MMHG (ref 26–37)
PCO2 TEMP ADJ BLDA: 35 MMHG (ref 26–37)
PH BLDA: 7.39 [PH] (ref 7.4–7.5)
PH TEMP ADJ BLDA: 7.4 [PH] (ref 7.4–7.5)
PHOSPHATE SERPL-MCNC: 3.5 MG/DL (ref 2.5–4.5)
PLATELET # BLD AUTO: 256 K/UL (ref 164–446)
PMV BLD AUTO: 9.1 FL (ref 9–12.9)
PO2 BLDA: 68 MMHG (ref 64–87)
PO2 TEMP ADJ BLDA: 65 MMHG (ref 64–87)
POTASSIUM SERPL-SCNC: 3.8 MMOL/L (ref 3.6–5.5)
RBC # BLD AUTO: 2.98 M/UL (ref 4.2–5.4)
SAO2 % BLDA: 93 % (ref 93–99)
SODIUM SERPL-SCNC: 146 MMOL/L (ref 135–145)
SPECIMEN DRAWN FROM PATIENT: ABNORMAL
WBC # BLD AUTO: 13.3 K/UL (ref 4.8–10.8)

## 2017-06-25 PROCEDURE — 85025 COMPLETE CBC W/AUTO DIFF WBC: CPT

## 2017-06-25 PROCEDURE — 700102 HCHG RX REV CODE 250 W/ 637 OVERRIDE(OP): Performed by: INTERNAL MEDICINE

## 2017-06-25 PROCEDURE — 84100 ASSAY OF PHOSPHORUS: CPT

## 2017-06-25 PROCEDURE — 99291 CRITICAL CARE FIRST HOUR: CPT | Performed by: INTERNAL MEDICINE

## 2017-06-25 PROCEDURE — 700101 HCHG RX REV CODE 250: Performed by: INTERNAL MEDICINE

## 2017-06-25 PROCEDURE — 82962 GLUCOSE BLOOD TEST: CPT | Mod: 91

## 2017-06-25 PROCEDURE — 71010 DX-CHEST-PORTABLE (1 VIEW): CPT

## 2017-06-25 PROCEDURE — 82803 BLOOD GASES ANY COMBINATION: CPT

## 2017-06-25 PROCEDURE — 700111 HCHG RX REV CODE 636 W/ 250 OVERRIDE (IP): Performed by: INTERNAL MEDICINE

## 2017-06-25 PROCEDURE — 94003 VENT MGMT INPAT SUBQ DAY: CPT

## 2017-06-25 PROCEDURE — 83735 ASSAY OF MAGNESIUM: CPT

## 2017-06-25 PROCEDURE — A9270 NON-COVERED ITEM OR SERVICE: HCPCS | Performed by: INTERNAL MEDICINE

## 2017-06-25 PROCEDURE — 770022 HCHG ROOM/CARE - ICU (200)

## 2017-06-25 PROCEDURE — 94640 AIRWAY INHALATION TREATMENT: CPT

## 2017-06-25 PROCEDURE — 700105 HCHG RX REV CODE 258: Performed by: INTERNAL MEDICINE

## 2017-06-25 PROCEDURE — 80048 BASIC METABOLIC PNL TOTAL CA: CPT

## 2017-06-25 RX ORDER — TRAZODONE HYDROCHLORIDE 150 MG/1
300 TABLET ORAL NIGHTLY
Status: DISCONTINUED | OUTPATIENT
Start: 2017-06-25 | End: 2017-07-03 | Stop reason: HOSPADM

## 2017-06-25 RX ORDER — OXYCODONE HYDROCHLORIDE 10 MG/1
10 TABLET ORAL
Status: DISCONTINUED | OUTPATIENT
Start: 2017-06-25 | End: 2017-06-28

## 2017-06-25 RX ORDER — AMLODIPINE BESYLATE 10 MG/1
10 TABLET ORAL DAILY
Status: DISCONTINUED | OUTPATIENT
Start: 2017-06-25 | End: 2017-07-01

## 2017-06-25 RX ORDER — CARVEDILOL 6.25 MG/1
3.12 TABLET ORAL 2 TIMES DAILY WITH MEALS
Status: DISCONTINUED | OUTPATIENT
Start: 2017-06-25 | End: 2017-06-26

## 2017-06-25 RX ORDER — LORAZEPAM 2 MG/ML
1 INJECTION INTRAMUSCULAR
Status: DISCONTINUED | OUTPATIENT
Start: 2017-06-25 | End: 2017-06-28

## 2017-06-25 RX ORDER — GABAPENTIN 300 MG/1
600 CAPSULE ORAL 3 TIMES DAILY
Status: DISCONTINUED | OUTPATIENT
Start: 2017-06-25 | End: 2017-07-03 | Stop reason: HOSPADM

## 2017-06-25 RX ORDER — DULOXETIN HYDROCHLORIDE 30 MG/1
60 CAPSULE, DELAYED RELEASE ORAL DAILY
Status: DISCONTINUED | OUTPATIENT
Start: 2017-06-25 | End: 2017-07-03 | Stop reason: HOSPADM

## 2017-06-25 RX ORDER — HALOPERIDOL 5 MG/ML
5 INJECTION INTRAMUSCULAR ONCE
Status: COMPLETED | OUTPATIENT
Start: 2017-06-25 | End: 2017-06-25

## 2017-06-25 RX ADMIN — METHYLPREDNISOLONE SODIUM SUCCINATE 62.5 MG: 125 INJECTION, POWDER, FOR SOLUTION INTRAMUSCULAR; INTRAVENOUS at 05:11

## 2017-06-25 RX ADMIN — GABAPENTIN 600 MG: 300 CAPSULE ORAL at 14:16

## 2017-06-25 RX ADMIN — METHYLPREDNISOLONE SODIUM SUCCINATE 62.5 MG: 125 INJECTION, POWDER, FOR SOLUTION INTRAMUSCULAR; INTRAVENOUS at 14:16

## 2017-06-25 RX ADMIN — TRAZODONE HYDROCHLORIDE 300 MG: 150 TABLET ORAL at 21:48

## 2017-06-25 RX ADMIN — GABAPENTIN 600 MG: 300 CAPSULE ORAL at 21:44

## 2017-06-25 RX ADMIN — FENTANYL CITRATE 100 MCG: 50 INJECTION, SOLUTION INTRAMUSCULAR; INTRAVENOUS at 01:02

## 2017-06-25 RX ADMIN — PROPOFOL 60 MCG/KG/MIN: 10 INJECTION, EMULSION INTRAVENOUS at 08:41

## 2017-06-25 RX ADMIN — DEXMEDETOMIDINE HYDROCHLORIDE 1.5 MCG/KG/HR: 100 INJECTION, SOLUTION INTRAVENOUS at 01:35

## 2017-06-25 RX ADMIN — DEXMEDETOMIDINE HYDROCHLORIDE 1.5 MCG/KG/HR: 100 INJECTION, SOLUTION INTRAVENOUS at 08:46

## 2017-06-25 RX ADMIN — DEXMEDETOMIDINE HYDROCHLORIDE 1.5 MCG/KG/HR: 100 INJECTION, SOLUTION INTRAVENOUS at 12:34

## 2017-06-25 RX ADMIN — HYDROXYZINE HYDROCHLORIDE 50 MG: 50 TABLET, FILM COATED ORAL at 05:19

## 2017-06-25 RX ADMIN — DEXMEDETOMIDINE HYDROCHLORIDE 1 MCG/KG/HR: 100 INJECTION, SOLUTION INTRAVENOUS at 17:03

## 2017-06-25 RX ADMIN — IPRATROPIUM BROMIDE AND ALBUTEROL SULFATE 3 ML: .5; 3 SOLUTION RESPIRATORY (INHALATION) at 07:10

## 2017-06-25 RX ADMIN — HYDRALAZINE HYDROCHLORIDE 10 MG: 20 INJECTION INTRAMUSCULAR; INTRAVENOUS at 15:02

## 2017-06-25 RX ADMIN — LORAZEPAM 1 MG: 2 INJECTION INTRAMUSCULAR; INTRAVENOUS at 00:51

## 2017-06-25 RX ADMIN — PROPOFOL 40 MCG/KG/MIN: 10 INJECTION, EMULSION INTRAVENOUS at 21:40

## 2017-06-25 RX ADMIN — IPRATROPIUM BROMIDE AND ALBUTEROL SULFATE 3 ML: .5; 3 SOLUTION RESPIRATORY (INHALATION) at 18:33

## 2017-06-25 RX ADMIN — LORAZEPAM 1 MG: 2 INJECTION INTRAMUSCULAR; INTRAVENOUS at 06:48

## 2017-06-25 RX ADMIN — HALOPERIDOL LACTATE 5 MG: 5 INJECTION, SOLUTION INTRAMUSCULAR at 07:51

## 2017-06-25 RX ADMIN — INSULIN LISPRO 3 UNITS: 100 INJECTION, SOLUTION INTRAVENOUS; SUBCUTANEOUS at 17:19

## 2017-06-25 RX ADMIN — DEXMEDETOMIDINE HYDROCHLORIDE 1.5 MCG/KG/HR: 100 INJECTION, SOLUTION INTRAVENOUS at 02:39

## 2017-06-25 RX ADMIN — HEPARIN SODIUM 5000 UNITS: 5000 INJECTION, SOLUTION INTRAVENOUS; SUBCUTANEOUS at 14:16

## 2017-06-25 RX ADMIN — DEXMEDETOMIDINE HYDROCHLORIDE 1.5 MCG/KG/HR: 100 INJECTION, SOLUTION INTRAVENOUS at 07:23

## 2017-06-25 RX ADMIN — CHLORHEXIDINE GLUCONATE 15 ML: 1.2 RINSE ORAL at 08:57

## 2017-06-25 RX ADMIN — INSULIN LISPRO 5 UNITS: 100 INJECTION, SOLUTION INTRAVENOUS; SUBCUTANEOUS at 23:41

## 2017-06-25 RX ADMIN — INSULIN GLARGINE 25 UNITS: 100 INJECTION, SOLUTION SUBCUTANEOUS at 05:27

## 2017-06-25 RX ADMIN — PROPOFOL 55 MCG/KG/MIN: 10 INJECTION, EMULSION INTRAVENOUS at 13:48

## 2017-06-25 RX ADMIN — DEXMEDETOMIDINE HYDROCHLORIDE 0.8 MCG/KG/HR: 100 INJECTION, SOLUTION INTRAVENOUS at 19:30

## 2017-06-25 RX ADMIN — METHYLPREDNISOLONE SODIUM SUCCINATE 62.5 MG: 125 INJECTION, POWDER, FOR SOLUTION INTRAMUSCULAR; INTRAVENOUS at 21:45

## 2017-06-25 RX ADMIN — IPRATROPIUM BROMIDE AND ALBUTEROL SULFATE 3 ML: .5; 3 SOLUTION RESPIRATORY (INHALATION) at 10:34

## 2017-06-25 RX ADMIN — HYDRALAZINE HYDROCHLORIDE 10 MG: 20 INJECTION INTRAMUSCULAR; INTRAVENOUS at 00:02

## 2017-06-25 RX ADMIN — LORAZEPAM 1 MG: 2 INJECTION INTRAMUSCULAR; INTRAVENOUS at 05:09

## 2017-06-25 RX ADMIN — DEXMEDETOMIDINE HYDROCHLORIDE 0.8 MCG/KG/HR: 100 INJECTION, SOLUTION INTRAVENOUS at 21:45

## 2017-06-25 RX ADMIN — DEXMEDETOMIDINE HYDROCHLORIDE 1.5 MCG/KG/HR: 100 INJECTION, SOLUTION INTRAVENOUS at 11:10

## 2017-06-25 RX ADMIN — PROPOFOL 70 MCG/KG/MIN: 10 INJECTION, EMULSION INTRAVENOUS at 11:43

## 2017-06-25 RX ADMIN — HEPARIN SODIUM 5000 UNITS: 5000 INJECTION, SOLUTION INTRAVENOUS; SUBCUTANEOUS at 21:44

## 2017-06-25 RX ADMIN — INSULIN LISPRO 2 UNITS: 100 INJECTION, SOLUTION INTRAVENOUS; SUBCUTANEOUS at 05:27

## 2017-06-25 RX ADMIN — AMPICILLIN SODIUM AND SULBACTAM SODIUM 3 G: 2; 1 INJECTION, POWDER, FOR SOLUTION INTRAMUSCULAR; INTRAVENOUS at 17:12

## 2017-06-25 RX ADMIN — CHLORHEXIDINE GLUCONATE 15 ML: 1.2 RINSE ORAL at 21:44

## 2017-06-25 RX ADMIN — DEXMEDETOMIDINE HYDROCHLORIDE 1.5 MCG/KG/HR: 100 INJECTION, SOLUTION INTRAVENOUS at 03:55

## 2017-06-25 RX ADMIN — AMPICILLIN SODIUM AND SULBACTAM SODIUM 3 G: 2; 1 INJECTION, POWDER, FOR SOLUTION INTRAMUSCULAR; INTRAVENOUS at 11:40

## 2017-06-25 RX ADMIN — DEXMEDETOMIDINE HYDROCHLORIDE 1.5 MCG/KG/HR: 100 INJECTION, SOLUTION INTRAVENOUS at 05:59

## 2017-06-25 RX ADMIN — DEXMEDETOMIDINE HYDROCHLORIDE 1.5 MCG/KG/HR: 100 INJECTION, SOLUTION INTRAVENOUS at 13:50

## 2017-06-25 RX ADMIN — AMPICILLIN SODIUM AND SULBACTAM SODIUM 3 G: 2; 1 INJECTION, POWDER, FOR SOLUTION INTRAMUSCULAR; INTRAVENOUS at 23:31

## 2017-06-25 RX ADMIN — IPRATROPIUM BROMIDE AND ALBUTEROL SULFATE 3 ML: .5; 3 SOLUTION RESPIRATORY (INHALATION) at 15:07

## 2017-06-25 RX ADMIN — HEPARIN SODIUM 5000 UNITS: 5000 INJECTION, SOLUTION INTRAVENOUS; SUBCUTANEOUS at 05:10

## 2017-06-25 RX ADMIN — AMPICILLIN SODIUM AND SULBACTAM SODIUM 3 G: 2; 1 INJECTION, POWDER, FOR SOLUTION INTRAMUSCULAR; INTRAVENOUS at 05:20

## 2017-06-25 RX ADMIN — FAMOTIDINE 20 MG: 20 TABLET, FILM COATED ORAL at 21:44

## 2017-06-25 RX ADMIN — PROPOFOL 50 MCG/KG/MIN: 10 INJECTION, EMULSION INTRAVENOUS at 16:29

## 2017-06-25 ASSESSMENT — LIFESTYLE VARIABLES: REASON UNABLE TO ASSESS: PT INTUBATED AND SEDATED

## 2017-06-25 NOTE — PROGRESS NOTES
Patient extremely anxious despite PRN medication. Patient breathing in the high 40's-50's on vent. Patient desating to 89%. Patient tachy at 101. MD Freitas notified. Orders to restart prop received.

## 2017-06-25 NOTE — CARE PLAN
Problem: Bowel/Gastric:  Goal: Normal bowel function is maintained or improved  Patient having multiple loose bowel movements over night, rectal tube inserted by NOC RN. Will hold stool softener.     Problem: Respiratory:  Goal: Respiratory status will improve  Outcome: PROGRESSING SLOWER THAN EXPECTED  Patient anxious, overbreathing while on the vent. PRN anxiety medication given per MAR.

## 2017-06-25 NOTE — PROGRESS NOTES
Report received from day shift RNLiana. Pt is anxious, communicates by gestures. Pt reassured that they are safe and we will round every hour. Lines, gtt, restrains and orders verified.

## 2017-06-25 NOTE — PROGRESS NOTES
"Pulmonary Critical Care Progress Note      Date of service: 6/25/2017    Chief Complaint: SOB    History of Present Illness: This is a 66-year-old lady who has a history of chronic hypoxemic respiratory failure and chronic obstructive pulmonary disease.  She was hospitalized at [facility] from on or about May 7th to 05/10/2017, with an ankle fracture.  She was then readmitted to [facility] from on or about June 2nd to 06/09/2017, with ventilator dependent respiratory failure and healthcare-associated pneumonia.  She was subsequently sent to a skilled nursing facility.  She now comes back to the emergency room this evening because of increasing shortness of breath.  She was brought in here with wheezing and coarse crackles.  She was in respiratory distress.  She was intubated.  She is now on full mechanical ventilatory support.  Once again, she has been more residing at a skilled nursing facility.    ROS:  Respiratory: unable to perform due to the patient's inability to effectively communicate, Cardiac: unable to perform due to the patient's inability to effectively communicate, GI: unable to perform due to the patient's inability to effectively communicate.  All other systems negative.    Interval Events:  24 hour interval history reviewed   Tmax 99.3 °F   +280cc over the last 24 hours, -2.1L since admit   CXR shows: bibasilar atelectasis, ongoing diffuse interstitial edema.  Following commands on propofol and off Ativan, but not following this AM  Very anxious and \"freaking out\" per nurse this AM, restarted propofol - will receive Haldol 5  SB/SR, -170 systolic - received x3 blood pressure meds.  Pt pulled Cortrak, will replace today.   Non-mobilized due to agitation and anxiety.   Solumedrol decreased yesterday, FSBS improved    PFSH:  No change.    Physical Exam:  General:  Mild distress.  HEENT:  Normocephalic, atraumatic.   CVS:  Regular rate and rhythm.   Respiratory:  Diminshed  Abdomen:  Soft, positive " BS  Extremities:  Without edema   Neuro/Psych:  Awake, not following commands.      Respiratory:  Ballard Vent Mode: APVCMV, Rate (breaths/min): 20, Vt Target (mL): 330, PEEP/CPAP: 8, FiO2: 40, Static Compliance (ml / cm H2O): 63, Control VTE (exp VT): 365  Pulse Oximetry: 100 %  ImagingAvailable data reviewed   Recent Labs      06/23/17   0417  06/24/17   0440  06/25/17   0413   ISTATAPH  7.350*  7.355*  7.392*   ISTATAPCO2  44.0*  38.7*  36.0   ISTATAPO2  79  53*  68   ISTATATCO2  26  23  23   ELJCLDC1RAH  95  85*  93   ISTATARTHCO3  24.3  21.6  21.9   ISTATARTBE  -1  -4  -3   ISTATTEMP  97.2 F  97.7 F  97.5 F   ISTATFIO2  35  30  40   ISTATSPEC  Arterial  Arterial  Arterial   ISTATAPHTC  7.362*  7.363*  7.401   DBFMPIAZ2FE  75  51*  65     HemoDynamics:  Pulse: 72, Heart Rate (Monitored): 71  NIBP: (!) 161/72 mmHg  CVP (mm Hg): (!) 20's MM HG  Imaging: Available data reviewed   Recent Labs      06/22/17   1150   BNPBTYPENAT  1186*     Neuro:  GCS Total Jackson Coma Score: 11  Imaging: Available data reviewed    Fluids:  Intake/Output       06/23/17 0700 - 06/24/17 0659 06/24/17 0700 - 06/25/17 0659 06/25/17 0700 - 06/26/17 0659      1806-7814 1651-1746 Total 0569-1754 1640-1026 Total 1942-4316 7211-8498 Total       Intake    I.V.  137.2  465.5 602.7  1039.4  499.2 1538.6  --  -- --    Precedex Volume 137.2 218.4 355.6 436.5 469.2 905.7 -- -- --    Propofol Volume -- 247.1 247.1 233 0 233 -- -- --    IV Volume (TKO) -- -- -- 20 30 50 -- -- --    IV Piggyback Volume (IV Piggyback) -- -- -- 350 -- 350 -- -- --    Blood  300  -- 300  --  -- --  --  -- --    Volume (RELEASE RED BLOOD CELLS) 300 -- 300 -- -- -- -- -- --    Other  30  -- 30  60  60 120  --  -- --    Medications (P.O./ Enteral Liquids) 30 -- 30 60 60 120 -- -- --    Enteral  680  570 1250  660  630 1290  --  -- --    Enteral Volume   -- -- --    Free Water / Tube Flush 90 -- 90 120 30 150 -- -- --    Total Intake 1147.2 1035.5  2182.7 1759.4 1189.2 2948.6 -- -- --       Output    Urine  1300  1625 2925  905  1775 2680  --  -- --    Indwelling Cathether 1300 1625 2925 905 1775 2680 -- -- --    Drains  --  -- --  0  -- 0  --  -- --    Residual Amount (ml) (Discarded) -- -- -- 0 -- 0 -- -- --    Stool  --  -- --  --  -- --  --  -- --    Number of Times Stooled 1 x 1 x 2 x -- 4 x 4 x -- -- --    Total Output 1300 1625 2925 905 1775 2680 -- -- --       Net I/O     -152.8 -589.5 -742.3 854.4 -585.8 268.6 -- -- --        Weight: 104.4 kg (230 lb 2.6 oz)  Recent Labs      17   0502  17   0540  17   0531   SODIUM  141  141  146*   POTASSIUM  4.0  3.6  3.8   CHLORIDE  109  111  115*   CO2  23  22  22   BUN  34*  46*  54*   CREATININE  1.23  1.20  1.05   MAGNESIUM  2.1  2.1  2.2   PHOSPHORUS  3.7  3.3  3.5   CALCIUM  8.5  8.8  9.1     GI/Nutrition:  Imaging: Available data reviewed  NPO     Liver Function  Recent Labs      17   0502  17   0540  17   0531   GLUCOSE  269*  310*  162*     Heme:  Recent Labs      17   1440  17   0540  17   0531   RBC  2.84*  2.93*  2.98*   HEMOGLOBIN  8.1*  8.3*  8.4*   HEMATOCRIT  26.0*  27.2*  27.9*   PLATELETCT  209  210  256     Infectious Disease:  Monitored Temp  Av.7 °C (98.1 °F)  Min: 36.3 °C (97.3 °F)  Max: 37.4 °C (99.3 °F)  Micro: cultures reviewed   Recent Labs      17   1440  17   0540  17   0531   WBC  16.4*  14.8*  13.3*   NEUTSPOLYS  95.30*  95.90*  92.00*   LYMPHOCYTES  1.20*  1.60*  2.70*   MONOCYTES  1.70  1.30  3.20   EOSINOPHILS  0.00  0.00  0.00   BASOPHILS  0.10  0.10  0.10     Current Facility-Administered Medications   Medication Dose Frequency Provider Last Rate Last Dose   • lorazepam (ATIVAN) injection 1 mg  1 mg Q HOUR PRN Angel Lema M.D.   1 mg at 17 06   • famotidine (PEPCID) tablet 20 mg  20 mg BID Angel Lema M.D.   20 mg at 17   • ampicillin/sulbactam (UNASYN) 3 g in NS  100 mL IVPB  3 g Q6HRS William Hope M.D.   Stopped at 06/25/17 0550   • methylPREDNISolone sod succ (SOLU-MEDROL) 125 MG injection 62.5 mg  62.5 mg Q8HRS William Hope M.D.   62.5 mg at 06/25/17 0511   • insulin glargine (LANTUS) injection 25 Units  25 Units QAM INSULIN William Hope M.D.   25 Units at 06/25/17 0527   • dexmedetomidine (PRECEDEX) 200 mcg in NS 50 mL infusion  0.1-1.5 mcg/kg/hr Continuous Wliliam Hope M.D. 26.1 mL/hr at 06/25/17 0613 1 mcg/kg/hr at 06/25/17 0613   • hydrALAZINE (APRESOLINE) injection 10 mg  10 mg Q4HRS PRN Carito Richards M.D.   10 mg at 06/25/17 0002   • hydrOXYzine (ATARAX) tablet 50 mg  50 mg TID PRN Carito Richards M.D.   50 mg at 06/25/17 0519   • Respiratory Care per Protocol   Continuous RT Angel Lema M.D.       • senna-docusate (PERICOLACE or SENOKOT S) 8.6-50 MG per tablet 2 Tab  2 Tab BID Angel Lema M.D.   2 Tab at 06/24/17 2056    And   • polyethylene glycol/lytes (MIRALAX) PACKET 1 Packet  1 Packet QDAY PRN Angel Lema M.D.        And   • magnesium hydroxide (MILK OF MAGNESIA) suspension 30 mL  30 mL QDAY PRN Angel Lema M.D.        And   • bisacodyl (DULCOLAX) suppository 10 mg  10 mg QDAY PRN Angel Lema M.D.       • chlorhexidine (PERIDEX) 0.12 % solution 15 mL  15 mL BID Angel Lema M.D.   15 mL at 06/24/17 2056   • lidocaine (XYLOCAINE) 1%  injection  1-2 mL Q30 MIN PRN Angel Lema M.D.   2 mL at 06/23/17 2343   • MD ALERT...Adult ICU Electrolyte Replacement per Pharmacy Protocol   pharmacy to dose Angel Lema M.D.       • fentaNYL (SUBLIMAZE) injection 25 mcg  25 mcg Q HOUR PRN Angel Lema M.D.        Or   • fentaNYL (SUBLIMAZE) injection 50 mcg  50 mcg Q HOUR PRN Angel Lema M.D.   50 mcg at 06/24/17 2156    Or   • fentaNYL (SUBLIMAZE) injection 100 mcg  100 mcg Q HOUR PRN Angel Lema M.D.   100 mcg at 06/25/17 0102    • ipratropium-albuterol (DUONEB) nebulizer solution 3 mL  3 mL Q2HRS PRN (RT) Angel Lema M.D.       • insulin lispro (HUMALOG) injection 2-9 Units  2-9 Units Q6HRS Angel Lema M.D.   2 Units at 06/25/17 0527   • glucose 4 g chewable tablet 16 g  16 g Q15 MIN PRN Angel Lema M.D.        And   • dextrose 50% (D50W) injection 25 mL  25 mL Q15 MIN PRN Angel Lema M.D.       • propofol (DIPRIVAN) injection  5-80 mcg/kg/min Continuous Angel Lema M.D.   Stopped at 06/24/17 1746   • heparin injection 5,000 Units  5,000 Units Q8HRS Angel Lema M.D.   5,000 Units at 06/25/17 0510   • levothyroxine (SYNTHROID) tablet 75 mcg  75 mcg AM ES Carito Richards M.D.   75 mcg at 06/24/17 0740   • montelukast (SINGULAIR) tablet 10 mg  10 mg DAILY Carito Richards M.D.   10 mg at 06/24/17 0841   • acetaminophen (TYLENOL) tablet 650 mg  650 mg Q6HRS PRN Carito Richards M.D.       • ipratropium-albuterol (DUONEB) nebulizer solution 3 mL  3 mL 4X/DAY (RT) Angel Lema M.D.   3 mL at 06/24/17 1844     Last reviewed on 6/22/2017  2:52 PM by Rhea Cooper    Quality  Measures:  Labs reviewed, Medications reviewed and Radiology images reviewed  Mckeon catheter: Critically Ill - Requiring Accurate Measurement of Urinary Output                  Lines:  Right-sided PICC    Gtts:  Propofol off  Dex 0.1    Abx:  Day 5 Unasyn    Solumedrol 62.5 q8    Rcx 6/19 negative  BAL 6/22 negative   Ucx 6/22 negatice   bcx 6/22 negative     Echo 3/17 EF 60%, RVSP 60    Assessment/Plan  -  Ventilator-dependent respiratory failure.   - rt/02 protocol   - full vent support   - daily sat/sbt   - steroids/bds   - empiric abx   - f/u cultures  -  Acute exacerbation of chronic obstructive pulmonary disease.   - bds, steriods   - empiric abx  -  Severe sepsis, pulmonary source.   - sepsis protocol   - levo off   - goal cvp 10-12   - procalcitonin 2.9   - f/u  cultures and continue abx  -  Health-care-associated pneumonia.   - off vanco/zosyn   - on unasyn  -  Agitation   - restart all pt home psych/pain meds   - possible w/d from above   - on precedex   - start seroquel if no improvement  -  Anemia   - no obvious source of loss   - 1u prbcs 6/23  -  Possible coexisting acute pulmonary edema secondary to acute on chronic    diastolic heart failure.   - monitor volume balance   - continue resuscitation while on pressors and low map  -  Hyperglycemia - IDDM + steroids   - lantus (home dose 22u)   - ssi   -  Pulmonary HTN w RVSP of 60    -  Obstructive sleep apnea.  -  Gastroesophageal reflux disease.  -  Hepatitis C.  -  History of systemic arterial hypertension.  -  Diabetes mellitus type 2.  -  Chronic kidney disease.  -  History of atrial fibrillation.  -  Fibromyalgia.  -  Post-traumatic stress disorder.  -  Hyponatremia.  -  Hyperkalemia.  -  Recent ankle fracture in May of this year.    Discussed patient condition and risk of morbidity and/or mortality with RN, RT, Therapies, Pharmacy, [facility] resident and Charge nurse / hot rounds.    The patient remains critically ill.  Critical care time = 32 minutes in directly providing and coordinating critical care and extensive data review.  No time overlap and excludes procedures.    Lizzie CLAY (Alla), am scribing for, and in the presence of, William Hope M.D..    Electronically signed by: Lizzie Chambers (Alla), 6/25/2017    William CLAY M.D. personally performed the services described in this documentation, as scribed by Lizzie Chambers in my presence, and it is both accurate and complete.

## 2017-06-26 ENCOUNTER — APPOINTMENT (OUTPATIENT)
Dept: RADIOLOGY | Facility: MEDICAL CENTER | Age: 67
DRG: 870 | End: 2017-06-26
Attending: INTERNAL MEDICINE
Payer: MEDICARE

## 2017-06-26 LAB
ALBUMIN SERPL BCP-MCNC: 3.2 G/DL (ref 3.2–4.9)
ALBUMIN/GLOB SERPL: 1.1 G/DL
ALP SERPL-CCNC: 44 U/L (ref 30–99)
ALT SERPL-CCNC: 15 U/L (ref 2–50)
ANION GAP SERPL CALC-SCNC: 9 MMOL/L (ref 0–11.9)
AST SERPL-CCNC: 9 U/L (ref 12–45)
BASE EXCESS BLDA CALC-SCNC: -5 MMOL/L (ref -4–3)
BASOPHILS # BLD AUTO: 0.2 % (ref 0–1.8)
BASOPHILS # BLD: 0.01 K/UL (ref 0–0.12)
BILIRUB SERPL-MCNC: 0.8 MG/DL (ref 0.1–1.5)
BODY TEMPERATURE: ABNORMAL DEGREES
BUN SERPL-MCNC: 64 MG/DL (ref 8–22)
CALCIUM SERPL-MCNC: 9.1 MG/DL (ref 8.5–10.5)
CHLORIDE SERPL-SCNC: 116 MMOL/L (ref 96–112)
CO2 BLDA-SCNC: 21 MMOL/L (ref 20–33)
CO2 SERPL-SCNC: 22 MMOL/L (ref 20–33)
CREAT SERPL-MCNC: 1.11 MG/DL (ref 0.5–1.4)
CRP SERPL HS-MCNC: 1.62 MG/DL (ref 0–0.75)
EOSINOPHIL # BLD AUTO: 0 K/UL (ref 0–0.51)
EOSINOPHIL NFR BLD: 0 % (ref 0–6.9)
ERYTHROCYTE [DISTWIDTH] IN BLOOD BY AUTOMATED COUNT: 61.8 FL (ref 35.9–50)
GFR SERPL CREATININE-BSD FRML MDRD: 49 ML/MIN/1.73 M 2
GLOBULIN SER CALC-MCNC: 2.8 G/DL (ref 1.9–3.5)
GLUCOSE BLD-MCNC: 259 MG/DL (ref 65–99)
GLUCOSE BLD-MCNC: 270 MG/DL (ref 65–99)
GLUCOSE BLD-MCNC: 303 MG/DL (ref 65–99)
GLUCOSE BLD-MCNC: 361 MG/DL (ref 65–99)
GLUCOSE SERPL-MCNC: 274 MG/DL (ref 65–99)
HCO3 BLDA-SCNC: 20.2 MMOL/L (ref 17–25)
HCT VFR BLD AUTO: 26.3 % (ref 37–47)
HGB BLD-MCNC: 7.8 G/DL (ref 12–16)
IMM GRANULOCYTES # BLD AUTO: 0.23 K/UL (ref 0–0.11)
IMM GRANULOCYTES NFR BLD AUTO: 3.6 % (ref 0–0.9)
LYMPHOCYTES # BLD AUTO: 0.27 K/UL (ref 1–4.8)
LYMPHOCYTES NFR BLD: 4.2 % (ref 22–41)
MCH RBC QN AUTO: 28.2 PG (ref 27–33)
MCHC RBC AUTO-ENTMCNC: 29.7 G/DL (ref 33.6–35)
MCV RBC AUTO: 94.9 FL (ref 81.4–97.8)
MONOCYTES # BLD AUTO: 0.14 K/UL (ref 0–0.85)
MONOCYTES NFR BLD AUTO: 2.2 % (ref 0–13.4)
NEUTROPHILS # BLD AUTO: 5.8 K/UL (ref 2–7.15)
NEUTROPHILS NFR BLD: 89.8 % (ref 44–72)
NRBC # BLD AUTO: 0.05 K/UL
NRBC BLD AUTO-RTO: 0.8 /100 WBC
O2/TOTAL GAS SETTING VFR VENT: 40 %
PCO2 BLDA: 34.9 MMHG (ref 26–37)
PCO2 TEMP ADJ BLDA: 34.9 MMHG (ref 26–37)
PH BLDA: 7.37 [PH] (ref 7.4–7.5)
PH TEMP ADJ BLDA: 7.37 [PH] (ref 7.4–7.5)
PLATELET # BLD AUTO: 198 K/UL (ref 164–446)
PMV BLD AUTO: 9.9 FL (ref 9–12.9)
PO2 BLDA: 60 MMHG (ref 64–87)
PO2 TEMP ADJ BLDA: 60 MMHG (ref 64–87)
POTASSIUM SERPL-SCNC: 4.1 MMOL/L (ref 3.6–5.5)
PREALB SERPL-MCNC: 21 MG/DL (ref 18–38)
PROT SERPL-MCNC: 6 G/DL (ref 6–8.2)
RBC # BLD AUTO: 2.77 M/UL (ref 4.2–5.4)
SAO2 % BLDA: 90 % (ref 93–99)
SODIUM SERPL-SCNC: 147 MMOL/L (ref 135–145)
SPECIMEN DRAWN FROM PATIENT: ABNORMAL
TRIGL SERPL-MCNC: 288 MG/DL (ref 0–149)
WBC # BLD AUTO: 6.5 K/UL (ref 4.8–10.8)

## 2017-06-26 PROCEDURE — 700111 HCHG RX REV CODE 636 W/ 250 OVERRIDE (IP): Performed by: INTERNAL MEDICINE

## 2017-06-26 PROCEDURE — 94003 VENT MGMT INPAT SUBQ DAY: CPT

## 2017-06-26 PROCEDURE — 700102 HCHG RX REV CODE 250 W/ 637 OVERRIDE(OP): Performed by: INTERNAL MEDICINE

## 2017-06-26 PROCEDURE — 85025 COMPLETE CBC W/AUTO DIFF WBC: CPT

## 2017-06-26 PROCEDURE — 700105 HCHG RX REV CODE 258: Performed by: INTERNAL MEDICINE

## 2017-06-26 PROCEDURE — 86140 C-REACTIVE PROTEIN: CPT

## 2017-06-26 PROCEDURE — 700101 HCHG RX REV CODE 250: Performed by: INTERNAL MEDICINE

## 2017-06-26 PROCEDURE — A9270 NON-COVERED ITEM OR SERVICE: HCPCS | Performed by: INTERNAL MEDICINE

## 2017-06-26 PROCEDURE — 82962 GLUCOSE BLOOD TEST: CPT

## 2017-06-26 PROCEDURE — 94640 AIRWAY INHALATION TREATMENT: CPT

## 2017-06-26 PROCEDURE — 71010 DX-CHEST-PORTABLE (1 VIEW): CPT

## 2017-06-26 PROCEDURE — 36600 WITHDRAWAL OF ARTERIAL BLOOD: CPT

## 2017-06-26 PROCEDURE — 84478 ASSAY OF TRIGLYCERIDES: CPT

## 2017-06-26 PROCEDURE — 84134 ASSAY OF PREALBUMIN: CPT

## 2017-06-26 PROCEDURE — 80053 COMPREHEN METABOLIC PANEL: CPT

## 2017-06-26 PROCEDURE — 94150 VITAL CAPACITY TEST: CPT

## 2017-06-26 PROCEDURE — 99291 CRITICAL CARE FIRST HOUR: CPT | Performed by: INTERNAL MEDICINE

## 2017-06-26 PROCEDURE — 770022 HCHG ROOM/CARE - ICU (200)

## 2017-06-26 PROCEDURE — 82803 BLOOD GASES ANY COMBINATION: CPT

## 2017-06-26 RX ORDER — FUROSEMIDE 10 MG/ML
20 INJECTION INTRAMUSCULAR; INTRAVENOUS EVERY 12 HOURS
Status: DISCONTINUED | OUTPATIENT
Start: 2017-06-26 | End: 2017-06-27

## 2017-06-26 RX ORDER — CARVEDILOL 3.12 MG/1
3.12 TABLET ORAL 2 TIMES DAILY WITH MEALS
Status: DISCONTINUED | OUTPATIENT
Start: 2017-06-26 | End: 2017-07-01

## 2017-06-26 RX ORDER — METHYLPREDNISOLONE SODIUM SUCCINATE 125 MG/2ML
62.5 INJECTION, POWDER, LYOPHILIZED, FOR SOLUTION INTRAMUSCULAR; INTRAVENOUS EVERY 12 HOURS
Status: DISCONTINUED | OUTPATIENT
Start: 2017-06-26 | End: 2017-06-28

## 2017-06-26 RX ADMIN — DEXMEDETOMIDINE HYDROCHLORIDE 0.5 MCG/KG/HR: 100 INJECTION, SOLUTION INTRAVENOUS at 10:11

## 2017-06-26 RX ADMIN — PROPOFOL 40 MCG/KG/MIN: 10 INJECTION, EMULSION INTRAVENOUS at 00:46

## 2017-06-26 RX ADMIN — DEXMEDETOMIDINE HYDROCHLORIDE 0.8 MCG/KG/HR: 100 INJECTION, SOLUTION INTRAVENOUS at 03:31

## 2017-06-26 RX ADMIN — AMPICILLIN SODIUM AND SULBACTAM SODIUM 3 G: 2; 1 INJECTION, POWDER, FOR SOLUTION INTRAMUSCULAR; INTRAVENOUS at 17:47

## 2017-06-26 RX ADMIN — DEXMEDETOMIDINE HYDROCHLORIDE 0.8 MCG/KG/HR: 100 INJECTION, SOLUTION INTRAVENOUS at 06:27

## 2017-06-26 RX ADMIN — INSULIN GLARGINE 25 UNITS: 100 INJECTION, SOLUTION SUBCUTANEOUS at 05:19

## 2017-06-26 RX ADMIN — FAMOTIDINE 20 MG: 20 TABLET, FILM COATED ORAL at 22:56

## 2017-06-26 RX ADMIN — AMPICILLIN SODIUM AND SULBACTAM SODIUM 3 G: 2; 1 INJECTION, POWDER, FOR SOLUTION INTRAMUSCULAR; INTRAVENOUS at 05:08

## 2017-06-26 RX ADMIN — PROPOFOL 40 MCG/KG/MIN: 10 INJECTION, EMULSION INTRAVENOUS at 05:08

## 2017-06-26 RX ADMIN — GABAPENTIN 600 MG: 300 CAPSULE ORAL at 14:33

## 2017-06-26 RX ADMIN — STANDARDIZED SENNA CONCENTRATE AND DOCUSATE SODIUM 2 TABLET: 8.6; 5 TABLET, FILM COATED ORAL at 22:57

## 2017-06-26 RX ADMIN — CHLORHEXIDINE GLUCONATE 15 ML: 1.2 RINSE ORAL at 08:32

## 2017-06-26 RX ADMIN — DULOXETINE HYDROCHLORIDE 60 MG: 60 CAPSULE, DELAYED RELEASE ORAL at 08:32

## 2017-06-26 RX ADMIN — PROPOFOL 55 MCG/KG/MIN: 10 INJECTION, EMULSION INTRAVENOUS at 09:41

## 2017-06-26 RX ADMIN — IPRATROPIUM BROMIDE AND ALBUTEROL SULFATE 3 ML: .5; 3 SOLUTION RESPIRATORY (INHALATION) at 06:34

## 2017-06-26 RX ADMIN — FUROSEMIDE 20 MG: 10 INJECTION, SOLUTION INTRAMUSCULAR; INTRAVENOUS at 11:36

## 2017-06-26 RX ADMIN — PROPOFOL 50 MCG/KG/MIN: 10 INJECTION, EMULSION INTRAVENOUS at 17:47

## 2017-06-26 RX ADMIN — IPRATROPIUM BROMIDE AND ALBUTEROL SULFATE 3 ML: .5; 3 SOLUTION RESPIRATORY (INHALATION) at 14:15

## 2017-06-26 RX ADMIN — METHYLPREDNISOLONE SODIUM SUCCINATE 62.5 MG: 125 INJECTION, POWDER, FOR SOLUTION INTRAMUSCULAR; INTRAVENOUS at 22:56

## 2017-06-26 RX ADMIN — PROPOFOL 60 MCG/KG/MIN: 10 INJECTION, EMULSION INTRAVENOUS at 12:19

## 2017-06-26 RX ADMIN — INSULIN LISPRO 6 UNITS: 100 INJECTION, SOLUTION INTRAVENOUS; SUBCUTANEOUS at 11:38

## 2017-06-26 RX ADMIN — MONTELUKAST SODIUM 10 MG: 10 TABLET, FILM COATED ORAL at 08:32

## 2017-06-26 RX ADMIN — PROPOFOL 50 MCG/KG/MIN: 10 INJECTION, EMULSION INTRAVENOUS at 21:25

## 2017-06-26 RX ADMIN — DEXMEDETOMIDINE HYDROCHLORIDE 0.6 MCG/KG/HR: 100 INJECTION, SOLUTION INTRAVENOUS at 21:56

## 2017-06-26 RX ADMIN — IPRATROPIUM BROMIDE AND ALBUTEROL SULFATE 3 ML: .5; 3 SOLUTION RESPIRATORY (INHALATION) at 10:33

## 2017-06-26 RX ADMIN — HEPARIN SODIUM 5000 UNITS: 5000 INJECTION, SOLUTION INTRAVENOUS; SUBCUTANEOUS at 22:56

## 2017-06-26 RX ADMIN — DEXMEDETOMIDINE HYDROCHLORIDE 0.8 MCG/KG/HR: 100 INJECTION, SOLUTION INTRAVENOUS at 00:20

## 2017-06-26 RX ADMIN — FAMOTIDINE 20 MG: 20 TABLET, FILM COATED ORAL at 08:32

## 2017-06-26 RX ADMIN — HYDRALAZINE HYDROCHLORIDE 10 MG: 20 INJECTION INTRAMUSCULAR; INTRAVENOUS at 05:07

## 2017-06-26 RX ADMIN — DEXMEDETOMIDINE HYDROCHLORIDE 0.6 MCG/KG/HR: 100 INJECTION, SOLUTION INTRAVENOUS at 17:47

## 2017-06-26 RX ADMIN — PROPOFOL 50 MCG/KG/MIN: 10 INJECTION, EMULSION INTRAVENOUS at 15:18

## 2017-06-26 RX ADMIN — TRAZODONE HYDROCHLORIDE 300 MG: 150 TABLET ORAL at 22:56

## 2017-06-26 RX ADMIN — GABAPENTIN 600 MG: 300 CAPSULE ORAL at 08:32

## 2017-06-26 RX ADMIN — METHYLPREDNISOLONE SODIUM SUCCINATE 62.5 MG: 125 INJECTION, POWDER, FOR SOLUTION INTRAMUSCULAR; INTRAVENOUS at 05:07

## 2017-06-26 RX ADMIN — FUROSEMIDE 20 MG: 10 INJECTION, SOLUTION INTRAMUSCULAR; INTRAVENOUS at 22:56

## 2017-06-26 RX ADMIN — DEXMEDETOMIDINE HYDROCHLORIDE 0.6 MCG/KG/HR: 100 INJECTION, SOLUTION INTRAVENOUS at 14:22

## 2017-06-26 RX ADMIN — AMLODIPINE BESYLATE 10 MG: 5 TABLET ORAL at 08:32

## 2017-06-26 RX ADMIN — INSULIN LISPRO 8 UNITS: 100 INJECTION, SOLUTION INTRAVENOUS; SUBCUTANEOUS at 17:50

## 2017-06-26 RX ADMIN — INSULIN LISPRO 5 UNITS: 100 INJECTION, SOLUTION INTRAVENOUS; SUBCUTANEOUS at 05:19

## 2017-06-26 RX ADMIN — LEVOTHYROXINE SODIUM 75 MCG: 75 TABLET ORAL at 05:07

## 2017-06-26 RX ADMIN — CHLORHEXIDINE GLUCONATE 15 ML: 1.2 RINSE ORAL at 22:56

## 2017-06-26 RX ADMIN — GABAPENTIN 600 MG: 300 CAPSULE ORAL at 22:56

## 2017-06-26 RX ADMIN — AMPICILLIN SODIUM AND SULBACTAM SODIUM 3 G: 2; 1 INJECTION, POWDER, FOR SOLUTION INTRAMUSCULAR; INTRAVENOUS at 11:37

## 2017-06-26 RX ADMIN — HEPARIN SODIUM 5000 UNITS: 5000 INJECTION, SOLUTION INTRAVENOUS; SUBCUTANEOUS at 14:33

## 2017-06-26 RX ADMIN — HEPARIN SODIUM 5000 UNITS: 5000 INJECTION, SOLUTION INTRAVENOUS; SUBCUTANEOUS at 05:08

## 2017-06-26 RX ADMIN — IPRATROPIUM BROMIDE AND ALBUTEROL SULFATE 3 ML: .5; 3 SOLUTION RESPIRATORY (INHALATION) at 18:17

## 2017-06-26 ASSESSMENT — LIFESTYLE VARIABLES: REASON UNABLE TO ASSESS: INTUBATED

## 2017-06-26 ASSESSMENT — PULMONARY FUNCTION TESTS: FVC: .81

## 2017-06-26 NOTE — CARE PLAN
Problem: Communication  Goal: The ability to communicate needs accurately and effectively will improve  Intervention: [place] patient and significant other/support system to call light to alert staff of needs  Educated the pt on how to use the call light and to not attempt to get out of bed without the assistance of a staff member.  Intervention: Educate patient and significant other/support system about the plan of care, procedures, treatments, medications and allow for questions  Educated the pt on the POC and answered all questions.

## 2017-06-26 NOTE — PROGRESS NOTES
[facility] ICU Progress Note      Admit Date: 6/21/2017  ICU Day: 5    Resident(s): Carito Richards  Attending: HORACE RYAN/ Dr. Gonda    Date & Time:   6/26/2017   7:09 AM       Patient ID:    Name:             Priya Callahan   YOB: 1950  Age:                 66 y.o.  female   MRN:               [id number]    HPI:  67 y/o female admitted to ICU after intubation for AHRF 2/2 AECOPD.     Consultants:  PMA: Dr. Hope/Gonda    Interval Events:  06/22/2017: Remains on vent, starting tube feeds.  Bronch'd today with tan secretions.  Cultures NGTD.  Continue current ABX.  Hyperkalemia resolved.  Renal function stable. Start lantus 15u for elevated sugars.      06/23/17 : No significant overnight events. She is on Vent support /20/8/35% FiO2 Her ABG today 7.35/44/79/24.3 getting better. On Propofol , Pressors and TF @45.  Recieving Abx Zosyn. Her HBG was 6.7 in AM , 1U PRBC given. Will recheck at 4.30 PM. SBT trials, Mobilization and plans of Extubation if she tolerates SBT.    06/24/17: Agitated overnight, biting on tube, bite guard placed, O2 improved.  Good UOP, CVP: 17.  Repleted electrolytes.  Cultures negative, de-escalate ABX, stop vanco/zosyn, start unasyn.  DC PICC line.  Repeat procalcitonin.  Reduce steroid frequency from Q6H to Q8H.    Sugars uncontrolled, Lantus 25u QAM.  Tolerated 2 hours SBT yesterday, no SBT yet today.  Possibly extubate today.      6/25 - agitated overnight requiring mutilple doses of ativan.  Appeared very uncomfortable on decreased doses of sedation today with increasing RR and decreasing sats.  Propofol gtt resumed. Cultures still ngtd.    6/26 - home meds resumed overnight and BP and agitation slightly better; will attempt SBTs today; start free water flushes for mild hypernatremia; continue to wean steriods    Review of Systems   Unable to perform ROS: intubated       PHYSICAL EXAM  Gen: NAD  HEENT: NC/AT, no scleral icterus, no conjunctival  injection, mucous membranes pink and moist.  Neck: Supple, no lymphadenopathy.  Cardiac: S1S2, RRR, no m/r/g, no JVD.  Respiratory: Normal effort, symmetrical, CTA b/l.  Abdomen: BS+, soft, NT/ND, no rebound/guarding, no palpable organomegaly.  Ext: pedal edema, 2+ DP pulses b/l.  Skin: Warm, dry. No rashes or erythema.   Neuro: intubated and sedated, follows commands    Respiratory:  Ballard Vent Mode: APVCMV  Respiration: (!) 26, Pulse Oximetry: 97 %    Chest Tube Drains:    Recent Labs      06/24/17   0440  06/25/17   0413  06/26/17   0431   ISTATAPH  7.355*  7.392*  7.371*   ISTATAPCO2  38.7*  36.0  34.9   ISTATAPO2  53*  68  60*   ISTATATCO2  23  23  21   [id number]  85*  93  90*   ISTATARTHCO3  21.6  21.9  20.2   ISTATARTBE  -4  -3  -5*   ISTATTEMP  97.7 F  97.5 F  98.6 F   ISTATFIO2  30  40  40   ISTATSPEC  Arterial  Arterial  Arterial   ISTATAPHTC  7.363*  7.401  7.371*   [id number]  51*  65  60*       HemoDynamics:  Pulse: (!) 49, Heart Rate (Monitored): (!) 45 NIBP: (!) 179/74 mmHg CVP (mm Hg): (!) 16 MM HG    Neuro:      Fluids:        Intake/Output Summary (Last 24 hours) at 06/25/17 0700  Last data filed at 06/25/17 0600   Gross per 24 hour   Intake 2948.63 ml   Output   2680 ml   Net 268.63 ml          Body mass index is 38.91 kg/(m^2).    Recent Labs      06/24/17   0540  06/25/17   0531  06/26/17   0530   SODIUM  141  146*  147*   POTASSIUM  3.6  3.8  4.1   CHLORIDE  111  115*  116*   CO2  22  22  22   BUN  46*  54*  64*   CREATININE  1.20  1.05  1.11   MAGNESIUM  2.1  2.2   --    PHOSPHORUS  3.3  3.5   --    CALCIUM  8.8  9.1  9.1       GI/Nutrition:  Recent Labs      06/24/17   0540  06/25/17   0531  06/26/17   0530   ALTSGPT   --    --   15   ASTSGOT   --    --   9*   ALKPHOSPHAT   --    --   44   TBILIRUBIN   --    --   0.8   PREALBUMIN   --    --   21.0   GLUCOSE  310*  162*  274*       Heme:  Recent Labs      06/24/17   0540  06/25/17   0531  06/26/17   0530   RBC  2.93*  2.98*  2.77*    HEMOGLOBIN  8.3*  8.4*  7.8*   HEMATOCRIT  27.2*  27.9*  26.3*   PLATELETCT  210  256  198       Infectious Disease:  Monitored Temp  Av.7 °C (98 °F)  Min: 35.8 °C (96.4 °F)  Max: 37.3 °C (99.1 °F)  Recent Labs      1740  17   0531  17   0530   WBC  14.8*  13.3*  6.5   NEUTSPOLYS  95.90*  92.00*  89.80*   LYMPHOCYTES  1.60*  2.70*  4.20*   MONOCYTES  1.30  3.20  2.20   EOSINOPHILS  0.00  0.00  0.00   BASOPHILS  0.10  0.10  0.20   ASTSGOT   --    --   9*   ALTSGPT   --    --   15   ALKPHOSPHAT   --    --   44   TBILIRUBIN   --    --   0.8       Meds:  • lorazepam  1 mg     • gabapentin  600 mg     • amlodipine  10 mg     • carvedilol  3.125 mg     • duloxetine  60 mg     • oxycodone immediate release  10 mg     • trazodone  300 mg     • famotidine  20 mg     • ampicillin-sulbactam (UNASYN) IV  3 g Stopped (17)   • methylPREDNISolone  62.5 mg     • insulin glargine  25 Units     • dexmedetomidine (PRECEDEX) infusion 4 mcg/ml  0.1-1.5 mcg/kg/hr 0.8 mcg/kg/hr (17)   • hydrALAZINE  10 mg     • hydrOXYzine  50 mg     • Respiratory Care per Protocol       • senna-docusate  2 Tab      And   • polyethylene glycol/lytes  1 Packet      And   • magnesium hydroxide  30 mL      And   • bisacodyl  10 mg     • chlorhexidine  15 mL     • lidocaine  1-2 mL     • MD ALERT...Adult ICU Electrolyte Replacement per Pharmacy Protocol       • fentaNYL  25 mcg      Or   • fentaNYL  50 mcg      Or   • fentaNYL  100 mcg     • ipratropium-albuterol  3 mL     • insulin lispro  2-9 Units     • glucose 4 g  16 g      And   • dextrose 50%  25 mL     • propofol  5-80 mcg/kg/min 30 mcg/kg/min (17)   • heparin  5,000 Units     • levothyroxine  75 mcg     • montelukast  10 mg     • acetaminophen  650 mg     • ipratropium-albuterol  3 mL          Procedures:   - intuabation, bronchoscopy, and R IJV CVC placement    Imaging:  DX-CHEST-PORTABLE (1 VIEW)   Final Result         1.  Pulmonary  edema and/or infiltrates are identified, which appear somewhat increased since the prior exam.   2.  Cardiomegaly      DX-ABDOMEN FOR TUBE PLACEMENT   Final Result      Enteric tube has been placed and the tip projects over the stomach.      DX-CHEST-PORTABLE (1 VIEW)   Final Result      Findings consistent with pulmonary edema, with no significant change.      DX-CHEST-PORTABLE (1 VIEW)   Final Result      Stable chest appearance compared with 6/23.      DX-CHEST-PORTABLE (1 VIEW)   Final Result      Stable chest appearance compared with 6/22.      DX-ABDOMEN FOR TUBE PLACEMENT   Final Result      Feeding tube and nasogastric tube in place as noted above.      DX-CHEST-PORTABLE (1 VIEW)   Final Result      Right central venous line in place, with no pneumothorax. Improving pulmonary edema.      DX-CHEST-PORTABLE (1 VIEW)   Final Result      Mid and lower zone opacities, most consistent with pulmonary edema, overall worse compared with 6/13/2017.          Problem and Plan:    ACUTE HYPOXIC RESPIRATORY FAILURE  CHRONIC RESPIRATORY FAILURE  AECOPD  - CXR shows worsened bilateral LL infiltrates; echo normal in 3/2017; LE US neg for DVT in 6/2017; CTPE neg in 6/2017; echo in 2015 shows normal EF with G2 diastolic dysfunction and pulmonary htn; likely 2/2 COPD exacerbation 2/2 HCAP; intubated and sedated; RT protocol; solumedrol; abx as below    SEPSIS 2/2 RESPIRATORY SOURCE  HCAP  LEUKOCYTOSIS  - multiple recent hospital admissions from nursing home; cultures from prior admission all neg; UA neg; CXR shows worsened infiltrates on admission; bl/urine/BAL cultures NGTD this admission; continue IVF hydration and unasyn; procalcitonin trending down; WBC normalized; continue unasyn to complete 7 day abx course    ACUTE ON CHRONIC NORMOCYTIC ANEMIA - s/p 1 U pRBC on 6/23; h/h stable; no e/o bleeding    HYPERKALEMIA - resolved    HYPERNATREMIA - mild; will start free water flushes BID    CKD-3 - stable at baseline; avoid  nephrotoxins    DM2 WITH NEUROPATHY - a1c 5.2; Lantus and SSI with Accuchecks; hypoglycemia protocol; continue home gabapentin    HTN -  home amlodipine and coreg resumed; hydralazine prn    CHRONIC HEP C - stable    FIBROMYALGIA - hold home pain meds    ASTHMA - continue monteleukast    HYPOTHYROIDISM - continue home synthroid      DISPO: ICU management of AHRF     CODE STATUS: FULL    Quality Measures:  Mckeon Catheter: yes  DVT Prophylaxis: heparin  Ulcer Prophylaxis: pepcid  Antibiotics: unasyn  Lines: R IJV CVC

## 2017-06-26 NOTE — CARE PLAN
Problem: Ventilation Defect:  Goal: Ability to achieve and maintain unassisted ventilation or tolerate decreased levels of ventilator support  Intervention: Support and monitor invasive and noninvasive mechanical ventilation  Adult Ventilation Update    Total Vent Days: 5      Patient Lines/Drains/Airways Status    Active Airway      Name: Placement date: Placement time: Site: Days:     Airway Group ET Tube Oral 8.0 06/21/17  2245  Oral  5                   Patient failed trials because of Barriers to Wean: Other (Comments) (held per Dr. ch) (06/25/17 0705)       Events/Summary/Plan:SBT held per

## 2017-06-26 NOTE — FLOWSHEET NOTE
Ventilator Weaning Update    Patient is on vent day 6.  SBT was tolerated for a minimum of 1 hours on settings of 5/8.    Wean parameters for this SBT were:  #FVC / Vital Capacity (liters) : 0.81 (06/26/17 1524)  NIF (cm H2O) : -52 (06/26/17 1524)  Rapid Shallow Breathing Index (RR/VT): 34 (06/26/17 1524)  RR (bpm): 16 (06/26/17 1524)  Spontaneous VE: 8.8 (06/26/17 1524)  Spontaneous VT: 520 (06/26/17 1524)    Recommendation: continue weaning per protocol.         Events/Summary/Plan: SVN, SBT (06/26/17 2718)

## 2017-06-26 NOTE — PROGRESS NOTES
Pulmonary Critical Care Progress Note      Date of Service: 6/26/17    Chief Complaint: Shortness of Breath    History of Present Illness: This is a 66-year-old lady who has a history of chronic hypoxemic respiratory failure and chronic obstructive pulmonary disease.  She was hospitalized at [facility] from on or about May 7th to 05/10/2017, with an ankle fracture.  She was then readmitted to [facility] from on or about June 2nd to 06/09/2017, with ventilator dependent respiratory failure and healthcare-associated pneumonia.  She was subsequently sent to a skilled nursing facility.  She now comes back to the emergency room this evening because of increasing shortness of breath.  She was brought in here with wheezing and coarse crackles.  She was in respiratory distress.  She was intubated.  She is now on full mechanical ventilatory support.  Once again, she has been more residing at a skilled nursing facility.    ROS:    Respiratory: unable to perform due to the patient's inability to effectively communicate,   Cardiac: unable to perform due to the patient's inability to effectively communicate,   GI: unable to perform due to the patient's inability to effectively communicate.    All other systems negative.    Interval Events:  24 hour interval history reviewed   Less agitated and anxious, intermittently follows  SB, turning down Dex and Prop, -160's systolic  Adequate UO  Tolerating TF  Resumed at home medications  3 PRNs for BP overnight, amlodipine given this morning      PFSH:  No change.    Respiratory:  Ballard Vent Mode: APVCMV, Rate (breaths/min): 20, Vt Target (mL): 330, PEEP/CPAP: 8, FiO2: 45, Static Compliance (ml / cm H2O): 190, Control VTE (exp VT): 390  Pulse Oximetry: 97 %           Vent day 6  No SBTs  No secretions    Exam: Few rhonchi at bases, no wheeze, mildly tachypneic, PIP low teens  ImagingCXR  I have personally reviewed the chest x-ray my impression is  enlarged cardiac silhouette, diffuse  bilateral infiltrates. ET tube feeding tube, and RIJ in good position. Moderate edema.   Recent Labs      06/24/17   0440  06/25/17   0413  06/26/17   0431   ISTATAPH  7.355*  7.392*  7.371*   ISTATAPCO2  38.7*  36.0  34.9   ISTATAPO2  53*  68  60*   ISTATATCO2  23  23  21   [id number]  85*  93  90*   ISTATARTHCO3  21.6  21.9  20.2   ISTATARTBE  -4  -3  -5*   ISTATTEMP  97.7 F  97.5 F  98.6 F   ISTATFIO2  30  40  40   ISTATSPEC  Arterial  Arterial  Arterial   ISTATAPHTC  7.363*  7.401  7.371*   [id number]  51*  65  60*   Blood gas shows: Partially compensated metabolic acidosis with adequate oxygenation.    HemoDynamics:  Pulse: (!) 49, Heart Rate (Monitored): (!) 45  NIBP: (!) 179/74 mmHg  CVP (mm Hg): (!) 16 MM HG CVP 14    Exam: RRR, no murmur, trace edema, good UO  Imaging: Available data reviewed           Neuro:  GCS Total Jackson Coma Score: 8        Propofol 45, Dex     Exam: Mildly agitated, moving all, does follow commands without focal deficits  Imaging: Available data reviewed    Fluids:  Intake/Output       06/24/17 0700 - 06/25/17 0659 06/25/17 0700 - 06/26/17 0659 06/26/17 0700 - 06/27/17 0659      5679-2610 9228-4082 Total 2618-1064 4455-8317 Total 4298-1067 4953-2072 Total       Intake    I.V.  1039.4  499.2 1538.6  765.3  813.8 1579.1  --  -- --    Precedex Volume 436.5 469.2 905.7 437 249.6 686.6 -- -- --    Propofol Volume 233 0 233 278.3 254.2 532.4 -- -- --    IV Volume (TKO) 20 30 50 50 110 160 -- -- --    IV Piggyback Volume (IV Piggyback) 350 -- 350 -- 200 200 -- -- --    Other  60  60 120  30  160 190  --  -- --    Medications (P.O./ Enteral Liquids) 60 60 120 30 160 190 -- -- --    Enteral  660  630 1290  190  600 790  --  -- --    Enteral Volume  160 480 640 -- -- --    Free Water / Tube Flush 120 30 150 30 120 150 -- -- --    Total Intake 1759.4 1189.2 2948.6 985.3 1573.8 2559.1 -- -- --       Output    Urine  905  1775 2680  970  530 1500  --  -- --    Indwelling Cathether  905 1775 2680  -- -- --    Drains  0  -- 0  --  -- --  --  -- --    Residual Amount (ml) (Discarded) 0 -- 0 -- -- -- -- -- --    Stool  --  -- --  --  -- --  --  -- --    Number of Times Stooled -- 4 x 4 x -- 0 x 0 x -- -- --    Total Output 905 1775 2680  -- -- --       Net I/O     854.4 -585.8 268.6 15.3 1043.8 1059.1 -- -- --           Recent Labs      17   0540  17   0531  17   0530   SODIUM  141  146*  147*   POTASSIUM  3.6  3.8  4.1   CHLORIDE  111  115*  116*   CO2  22  22  22   BUN  46*  54*  64*   CREATININE  1.20  1.05  1.11   MAGNESIUM  2.1  2.2   --    PHOSPHORUS  3.3  3.5   --    CALCIUM  8.8  9.1  9.1       GI/Nutrition:  Exam: Soft, NT, Normal bowel sounds, tolerating tube feeds  Imaging: Available data reviewed  NPO and tube feed Tolerated  Liver Function  Recent Labs      17   0540  17   0531  17   0530   ALTSGPT   --    --   15   ASTSGOT   --    --   9*   ALKPHOSPHAT   --    --   44   TBILIRUBIN   --    --   0.8   PREALBUMIN   --    --   21.0   GLUCOSE  310*  162*  274*       Heme:  Recent Labs      17   1440  17   0540  17   0531   RBC  2.84*  2.93*  2.98*   HEMOGLOBIN  8.1*  8.3*  8.4*   HEMATOCRIT  26.0*  27.2*  27.9*   PLATELETCT  209  210  256       Infectious Disease:  Monitored Temp  Av.7 °C (98.1 °F)  Min: 35.8 °C (96.4 °F)  Max: 37.6 °C (99.7 °F)   Day 6/7 Unasyn  Cultures negative  Micro: reviewed  Recent Labs      17   1440  17   0540  17   0531  17   0530   WBC  16.4*  14.8*  13.3*   --    NEUTSPOLYS  95.30*  95.90*  92.00*   --    LYMPHOCYTES  1.20*  1.60*  2.70*   --    MONOCYTES  1.70  1.30  3.20   --    EOSINOPHILS  0.00  0.00  0.00   --    BASOPHILS  0.10  0.10  0.10   --    ASTSGOT   --    --    --   9*   ALTSGPT   --    --    --   15   ALKPHOSPHAT   --    --    --   44   TBILIRUBIN   --    --    --   0.8     Current Facility-Administered Medications   Medication Dose  Frequency Provider Last Rate Last Dose   • lorazepam (ATIVAN) injection 1 mg  1 mg Q HOUR PRN Angel Lema M.D.   1 mg at 06/25/17 0648   • gabapentin (NEURONTIN) capsule 600 mg  600 mg TID William Hope M.D.   600 mg at 06/25/17 2144   • amlodipine (NORVASC) tablet 10 mg  10 mg DAILY William Hope M.D.   Stopped at 06/25/17 1100   • carvedilol (COREG) tablet 3.125 mg  3.125 mg BID WITH MEALS William Hope M.D.   Stopped at 06/25/17 1100   • duloxetine (CYMBALTA) capsule 60 mg  60 mg DAILY William Hope M.D.   Stopped at 06/25/17 1100   • oxycodone immediate release (ROXICODONE) tablet 10 mg  10 mg Q3HRS PRN William Hope M.D.       • trazodone (DESYREL) tablet 300 mg  300 mg Nightly William Hope M.D.   300 mg at 06/25/17 2148   • famotidine (PEPCID) tablet 20 mg  20 mg BID Angel Lema M.D.   20 mg at 06/25/17 2144   • ampicillin/sulbactam (UNASYN) 3 g in  mL IVPB  3 g Q6HRS Willima Hope M.D.   Stopped at 06/26/17 0538   • methylPREDNISolone sod succ (SOLU-MEDROL) 125 MG injection 62.5 mg  62.5 mg Q8HRS William Hope M.D.   62.5 mg at 06/26/17 0507   • insulin glargine (LANTUS) injection 25 Units  25 Units QAM INSULIN William Hope M.D.   25 Units at 06/26/17 0519   • dexmedetomidine (PRECEDEX) 200 mcg in NS 50 mL infusion  0.1-1.5 mcg/kg/hr Continuous William Hope M.D. 20.8 mL/hr at 06/26/17 0627 0.8 mcg/kg/hr at 06/26/17 0627   • hydrALAZINE (APRESOLINE) injection 10 mg  10 mg Q4HRS PRN Carito Richards M.D.   10 mg at 06/26/17 0507   • hydrOXYzine (ATARAX) tablet 50 mg  50 mg TID PRN Carito Richards M.D.   50 mg at 06/25/17 0519   • Respiratory Care per Protocol   Continuous RT Angel Lema M.D.       • senna-docusate (PERICOLACE or SENOKOT S) 8.6-50 MG per tablet 2 Tab  2 Tab BID Angel Lema M.D.   Stopped at 06/25/17 0900    And   • polyethylene glycol/lytes (MIRALAX) PACKET 1 Packet  1 Packet QDAY PRN Angel Hart  JAYCOB Soliz        And   • magnesium hydroxide (MILK OF MAGNESIA) suspension 30 mL  30 mL QDAY PRN Angel Lema M.D.        And   • bisacodyl (DULCOLAX) suppository 10 mg  10 mg QDAY PRN Angel Lema M.D.       • chlorhexidine (PERIDEX) 0.12 % solution 15 mL  15 mL BID Angel Lema M.D.   15 mL at 06/25/17 2144   • lidocaine (XYLOCAINE) 1%  injection  1-2 mL Q30 MIN PRN Angel Lema M.D.   2 mL at 06/23/17 2343   • MD ALERT...Adult ICU Electrolyte Replacement per Pharmacy Protocol   pharmacy to dose Angel Lema M.D.       • fentaNYL (SUBLIMAZE) injection 25 mcg  25 mcg Q HOUR PRN Angel Lema M.D.        Or   • fentaNYL (SUBLIMAZE) injection 50 mcg  50 mcg Q HOUR PRN Angel Lema M.D.   50 mcg at 06/24/17 2156    Or   • fentaNYL (SUBLIMAZE) injection 100 mcg  100 mcg Q HOUR PRN Angel Lema M.D.   100 mcg at 06/25/17 0102   • ipratropium-albuterol (DUONEB) nebulizer solution 3 mL  3 mL Q2HRS PRN (RT) Angel Lema M.D.       • insulin lispro (HUMALOG) injection 2-9 Units  2-9 Units Q6HRS Angel Lema M.D.   5 Units at 06/26/17 0519   • glucose 4 g chewable tablet 16 g  16 g Q15 MIN PRN Angel Lema M.D.        And   • dextrose 50% (D50W) injection 25 mL  25 mL Q15 MIN PRN Angel Lema M.D.       • propofol (DIPRIVAN) injection  5-80 mcg/kg/min Continuous Angel Lema M.D. 18.7 mL/hr at 06/26/17 0626 30 mcg/kg/min at 06/26/17 0626   • heparin injection 5,000 Units  5,000 Units Q8HRS Angel Lema M.D.   5,000 Units at 06/26/17 0508   • levothyroxine (SYNTHROID) tablet 75 mcg  75 mcg AM ES Carito Richards M.D.   75 mcg at 06/26/17 0507   • montelukast (SINGULAIR) tablet 10 mg  10 mg DAILY Carito Richards M.D.   Stopped at 06/25/17 0900   • acetaminophen (TYLENOL) tablet 650 mg  650 mg Q6HRS PRN Carito Richards M.D.       • ipratropium-albuterol (DUONEB)  nebulizer solution 3 mL  3 mL 4X/DAY (RT) Angel Lema M.D.   3 mL at 06/26/17 0634     Last reviewed on 6/22/2017  2:52 PM by Rhea Cooper    Quality  Measures:  Labs reviewed, Medications reviewed and Radiology images reviewed  Mckeon catheter: Critically Ill - Requiring Accurate Measurement of Urinary Output and Unconscious / Sedated Patient on a Ventilator  Central line in place: Need for access    DVT Prophylaxis: Heparin  DVT prophylaxis - mechanical: SCDs  Ulcer prophylaxis: Yes  Antibiotics: Treating active infection/contamination beyond 24 hours perioperative coverage  Assessed for rehab: Patient unable to tolerate rehabilitation therapeutic regimen    Assessment/Plan:  Ventilator-dependent respiratory failure - intubated 6/21              - rt/02 protocol              - full vent support, RR 15, start SBTs BID   - limit sedatives, precedex  Acute exacerbation of chronic obstructive pulmonary disease.              - bds, steroids (wean)  Severe sepsis, pulmonary source              - s/p sepsis protocol              - continue abx for 7 days total  Health-care-associated pneumonia              - on unasyn  Hyperactive delirium/Agitation              - continue home psych/pain meds  Acute blood loss Anemia - 1u prbcs 6/23  Acute pulmonary edema secondary to acute on chronic diastolic heart failure              - cont diuresis  Hyperglycemia - IDDM + steroids              - short/long-acting insulin  Pulmonary HTN w RVSP of 60    Obstructive sleep apnea  Acute on CKD - monitor, avoid nephrotoxins  Hypernatremia - free water  Prophylaxis, enteral nutrition    Discussed patient condition and risk of morbidity and/or mortality with RN, RT, Pharmacy, Charge nurse / hot rounds and [facility].    The patient remains critically ill.  Critical care time = 35 minutes in directly providing and coordinating critical care and extensive data review.  No time overlap and excludes procedures.       Nannette CLAY  Rios ([place]), am scribing for, and in the presence of, Jeremy Gonda, M.D..    Electronically signed by: Nannette Nation ([place]), 6/26/2017    I, Jeremy Gonda, M.D. personally performed the services described in this documentation, as scribed by Nannette Nation in my presence, and it is both accurate and complete.

## 2017-06-26 NOTE — CARE PLAN
Problem: Venous Thromboembolism (VTW)/Deep Vein Thrombosis (DVT) Prevention:  Goal: Patient will participate in Venous Thrombosis (VTE)/Deep Vein Thrombosis (DVT)Prevention Measures  Outcome: PROGRESSING AS EXPECTED  Patient has SCD's on and running. Heparin for pharmacological prophylaxis.     Problem: Bowel/Gastric:  Goal: Normal bowel function is maintained or improved  Outcome: PROGRESSING SLOWER THAN EXPECTED  Patient has had 2 loose bowel movements this am, rectal tube inserted per order. Stool softeners held.

## 2017-06-27 ENCOUNTER — APPOINTMENT (OUTPATIENT)
Dept: RADIOLOGY | Facility: MEDICAL CENTER | Age: 67
DRG: 870 | End: 2017-06-27
Attending: INTERNAL MEDICINE
Payer: MEDICARE

## 2017-06-27 LAB
ANION GAP SERPL CALC-SCNC: 10 MMOL/L (ref 0–11.9)
BACTERIA BLD CULT: NORMAL
BACTERIA BLD CULT: NORMAL
BASE EXCESS BLDA CALC-SCNC: -3 MMOL/L (ref -4–3)
BASOPHILS # BLD AUTO: 0.1 % (ref 0–1.8)
BASOPHILS # BLD: 0.01 K/UL (ref 0–0.12)
BODY TEMPERATURE: ABNORMAL DEGREES
BUN SERPL-MCNC: 69 MG/DL (ref 8–22)
CALCIUM SERPL-MCNC: 8.9 MG/DL (ref 8.5–10.5)
CHLORIDE SERPL-SCNC: 114 MMOL/L (ref 96–112)
CO2 BLDA-SCNC: 23 MMOL/L (ref 20–33)
CO2 SERPL-SCNC: 21 MMOL/L (ref 20–33)
CREAT SERPL-MCNC: 1.13 MG/DL (ref 0.5–1.4)
EOSINOPHIL # BLD AUTO: 0 K/UL (ref 0–0.51)
EOSINOPHIL NFR BLD: 0 % (ref 0–6.9)
ERYTHROCYTE [DISTWIDTH] IN BLOOD BY AUTOMATED COUNT: 62.9 FL (ref 35.9–50)
GFR SERPL CREATININE-BSD FRML MDRD: 48 ML/MIN/1.73 M 2
GLUCOSE BLD-MCNC: 149 MG/DL (ref 65–99)
GLUCOSE BLD-MCNC: 204 MG/DL (ref 65–99)
GLUCOSE BLD-MCNC: 296 MG/DL (ref 65–99)
GLUCOSE BLD-MCNC: 319 MG/DL (ref 65–99)
GLUCOSE BLD-MCNC: 320 MG/DL (ref 65–99)
GLUCOSE BLD-MCNC: 332 MG/DL (ref 65–99)
GLUCOSE SERPL-MCNC: 338 MG/DL (ref 65–99)
HCO3 BLDA-SCNC: 21.8 MMOL/L (ref 17–25)
HCT VFR BLD AUTO: 26 % (ref 37–47)
HGB BLD-MCNC: 7.8 G/DL (ref 12–16)
IMM GRANULOCYTES # BLD AUTO: 0.36 K/UL (ref 0–0.11)
IMM GRANULOCYTES NFR BLD AUTO: 4.5 % (ref 0–0.9)
LYMPHOCYTES # BLD AUTO: 0.21 K/UL (ref 1–4.8)
LYMPHOCYTES NFR BLD: 2.6 % (ref 22–41)
MCH RBC QN AUTO: 29.1 PG (ref 27–33)
MCHC RBC AUTO-ENTMCNC: 30 G/DL (ref 33.6–35)
MCV RBC AUTO: 97 FL (ref 81.4–97.8)
MONOCYTES # BLD AUTO: 0.2 K/UL (ref 0–0.85)
MONOCYTES NFR BLD AUTO: 2.5 % (ref 0–13.4)
NEUTROPHILS # BLD AUTO: 7.27 K/UL (ref 2–7.15)
NEUTROPHILS NFR BLD: 90.3 % (ref 44–72)
NRBC # BLD AUTO: 0.05 K/UL
NRBC BLD AUTO-RTO: 0.6 /100 WBC
O2/TOTAL GAS SETTING VFR VENT: 35 %
PCO2 BLDA: 38.9 MMHG (ref 26–37)
PCO2 TEMP ADJ BLDA: 37.1 MMHG (ref 26–37)
PH BLDA: 7.36 [PH] (ref 7.4–7.5)
PH TEMP ADJ BLDA: 7.37 [PH] (ref 7.4–7.5)
PLATELET # BLD AUTO: 172 K/UL (ref 164–446)
PMV BLD AUTO: 9.5 FL (ref 9–12.9)
PO2 BLDA: 63 MMHG (ref 64–87)
PO2 TEMP ADJ BLDA: 59 MMHG (ref 64–87)
POTASSIUM SERPL-SCNC: 4.1 MMOL/L (ref 3.6–5.5)
RBC # BLD AUTO: 2.68 M/UL (ref 4.2–5.4)
SAO2 % BLDA: 91 % (ref 93–99)
SIGNIFICANT IND 70042: NORMAL
SIGNIFICANT IND 70042: NORMAL
SITE SITE: NORMAL
SITE SITE: NORMAL
SODIUM SERPL-SCNC: 145 MMOL/L (ref 135–145)
SOURCE SOURCE: NORMAL
SOURCE SOURCE: NORMAL
SPECIMEN DRAWN FROM PATIENT: ABNORMAL
WBC # BLD AUTO: 8.1 K/UL (ref 4.8–10.8)

## 2017-06-27 PROCEDURE — 700111 HCHG RX REV CODE 636 W/ 250 OVERRIDE (IP): Performed by: INTERNAL MEDICINE

## 2017-06-27 PROCEDURE — 80048 BASIC METABOLIC PNL TOTAL CA: CPT

## 2017-06-27 PROCEDURE — A9270 NON-COVERED ITEM OR SERVICE: HCPCS | Performed by: INTERNAL MEDICINE

## 2017-06-27 PROCEDURE — 770022 HCHG ROOM/CARE - ICU (200)

## 2017-06-27 PROCEDURE — 85025 COMPLETE CBC W/AUTO DIFF WBC: CPT

## 2017-06-27 PROCEDURE — 36600 WITHDRAWAL OF ARTERIAL BLOOD: CPT

## 2017-06-27 PROCEDURE — 94640 AIRWAY INHALATION TREATMENT: CPT

## 2017-06-27 PROCEDURE — 82803 BLOOD GASES ANY COMBINATION: CPT

## 2017-06-27 PROCEDURE — 700102 HCHG RX REV CODE 250 W/ 637 OVERRIDE(OP): Performed by: INTERNAL MEDICINE

## 2017-06-27 PROCEDURE — 700105 HCHG RX REV CODE 258: Performed by: INTERNAL MEDICINE

## 2017-06-27 PROCEDURE — 94003 VENT MGMT INPAT SUBQ DAY: CPT

## 2017-06-27 PROCEDURE — 700101 HCHG RX REV CODE 250: Performed by: INTERNAL MEDICINE

## 2017-06-27 PROCEDURE — 94150 VITAL CAPACITY TEST: CPT

## 2017-06-27 PROCEDURE — 71010 DX-CHEST-PORTABLE (1 VIEW): CPT

## 2017-06-27 PROCEDURE — 82962 GLUCOSE BLOOD TEST: CPT

## 2017-06-27 RX ORDER — INSULIN GLARGINE 100 [IU]/ML
5 INJECTION, SOLUTION SUBCUTANEOUS ONCE
Status: COMPLETED | OUTPATIENT
Start: 2017-06-27 | End: 2017-06-27

## 2017-06-27 RX ORDER — INSULIN GLARGINE 100 [IU]/ML
30 INJECTION, SOLUTION SUBCUTANEOUS
Status: DISCONTINUED | OUTPATIENT
Start: 2017-06-28 | End: 2017-07-03 | Stop reason: HOSPADM

## 2017-06-27 RX ORDER — FUROSEMIDE 10 MG/ML
40 INJECTION INTRAMUSCULAR; INTRAVENOUS EVERY 12 HOURS
Status: DISCONTINUED | OUTPATIENT
Start: 2017-06-27 | End: 2017-06-28

## 2017-06-27 RX ADMIN — DEXMEDETOMIDINE HYDROCHLORIDE 0.5 MCG/KG/HR: 100 INJECTION, SOLUTION INTRAVENOUS at 05:00

## 2017-06-27 RX ADMIN — TRAZODONE HYDROCHLORIDE 300 MG: 150 TABLET ORAL at 20:21

## 2017-06-27 RX ADMIN — CHLORHEXIDINE GLUCONATE 15 ML: 1.2 RINSE ORAL at 20:21

## 2017-06-27 RX ADMIN — AMLODIPINE BESYLATE 10 MG: 5 TABLET ORAL at 08:32

## 2017-06-27 RX ADMIN — OXYCODONE HYDROCHLORIDE 10 MG: 10 TABLET ORAL at 18:09

## 2017-06-27 RX ADMIN — AMPICILLIN SODIUM AND SULBACTAM SODIUM 3 G: 2; 1 INJECTION, POWDER, FOR SOLUTION INTRAMUSCULAR; INTRAVENOUS at 18:05

## 2017-06-27 RX ADMIN — INSULIN LISPRO 3 UNITS: 100 INJECTION, SOLUTION INTRAVENOUS; SUBCUTANEOUS at 17:55

## 2017-06-27 RX ADMIN — FUROSEMIDE 20 MG: 10 INJECTION, SOLUTION INTRAMUSCULAR; INTRAVENOUS at 08:32

## 2017-06-27 RX ADMIN — OXYCODONE HYDROCHLORIDE 10 MG: 10 TABLET ORAL at 08:29

## 2017-06-27 RX ADMIN — DEXMEDETOMIDINE HYDROCHLORIDE 0.5 MCG/KG/HR: 100 INJECTION, SOLUTION INTRAVENOUS at 08:42

## 2017-06-27 RX ADMIN — IPRATROPIUM BROMIDE AND ALBUTEROL SULFATE 3 ML: .5; 3 SOLUTION RESPIRATORY (INHALATION) at 06:34

## 2017-06-27 RX ADMIN — HYDROXYZINE HYDROCHLORIDE 50 MG: 50 TABLET, FILM COATED ORAL at 23:15

## 2017-06-27 RX ADMIN — GABAPENTIN 600 MG: 300 CAPSULE ORAL at 14:55

## 2017-06-27 RX ADMIN — METHYLPREDNISOLONE SODIUM SUCCINATE 62.5 MG: 125 INJECTION, POWDER, FOR SOLUTION INTRAMUSCULAR; INTRAVENOUS at 08:32

## 2017-06-27 RX ADMIN — PROPOFOL 50 MCG/KG/MIN: 10 INJECTION, EMULSION INTRAVENOUS at 01:05

## 2017-06-27 RX ADMIN — DEXMEDETOMIDINE HYDROCHLORIDE 0.5 MCG/KG/HR: 100 INJECTION, SOLUTION INTRAVENOUS at 13:18

## 2017-06-27 RX ADMIN — MONTELUKAST SODIUM 10 MG: 10 TABLET, FILM COATED ORAL at 08:32

## 2017-06-27 RX ADMIN — GABAPENTIN 600 MG: 300 CAPSULE ORAL at 20:21

## 2017-06-27 RX ADMIN — IPRATROPIUM BROMIDE AND ALBUTEROL SULFATE 3 ML: .5; 3 SOLUTION RESPIRATORY (INHALATION) at 15:08

## 2017-06-27 RX ADMIN — PROPOFOL 40 MCG/KG/MIN: 10 INJECTION, EMULSION INTRAVENOUS at 07:15

## 2017-06-27 RX ADMIN — HYDRALAZINE HYDROCHLORIDE 10 MG: 20 INJECTION INTRAMUSCULAR; INTRAVENOUS at 21:05

## 2017-06-27 RX ADMIN — INSULIN LISPRO 5 UNITS: 100 INJECTION, SOLUTION INTRAVENOUS; SUBCUTANEOUS at 13:18

## 2017-06-27 RX ADMIN — INSULIN LISPRO 6 UNITS: 100 INJECTION, SOLUTION INTRAVENOUS; SUBCUTANEOUS at 00:10

## 2017-06-27 RX ADMIN — AMPICILLIN SODIUM AND SULBACTAM SODIUM 3 G: 2; 1 INJECTION, POWDER, FOR SOLUTION INTRAMUSCULAR; INTRAVENOUS at 13:18

## 2017-06-27 RX ADMIN — HYDRALAZINE HYDROCHLORIDE 10 MG: 20 INJECTION INTRAMUSCULAR; INTRAVENOUS at 16:21

## 2017-06-27 RX ADMIN — FUROSEMIDE 40 MG: 10 INJECTION, SOLUTION INTRAMUSCULAR; INTRAVENOUS at 20:21

## 2017-06-27 RX ADMIN — INSULIN LISPRO 6 UNITS: 100 INJECTION, SOLUTION INTRAVENOUS; SUBCUTANEOUS at 05:42

## 2017-06-27 RX ADMIN — FAMOTIDINE 20 MG: 20 TABLET, FILM COATED ORAL at 08:32

## 2017-06-27 RX ADMIN — LEVOTHYROXINE SODIUM 75 MCG: 75 TABLET ORAL at 05:42

## 2017-06-27 RX ADMIN — DEXMEDETOMIDINE HYDROCHLORIDE 0.6 MCG/KG/HR: 100 INJECTION, SOLUTION INTRAVENOUS at 00:01

## 2017-06-27 RX ADMIN — HEPARIN SODIUM 5000 UNITS: 5000 INJECTION, SOLUTION INTRAVENOUS; SUBCUTANEOUS at 05:42

## 2017-06-27 RX ADMIN — CARVEDILOL 3.12 MG: 6.25 TABLET, FILM COATED ORAL at 17:58

## 2017-06-27 RX ADMIN — AMPICILLIN SODIUM AND SULBACTAM SODIUM 3 G: 2; 1 INJECTION, POWDER, FOR SOLUTION INTRAMUSCULAR; INTRAVENOUS at 00:08

## 2017-06-27 RX ADMIN — INSULIN GLARGINE 5 UNITS: 100 INJECTION, SOLUTION SUBCUTANEOUS at 08:33

## 2017-06-27 RX ADMIN — CHLORHEXIDINE GLUCONATE 15 ML: 1.2 RINSE ORAL at 08:32

## 2017-06-27 RX ADMIN — HEPARIN SODIUM 5000 UNITS: 5000 INJECTION, SOLUTION INTRAVENOUS; SUBCUTANEOUS at 14:54

## 2017-06-27 RX ADMIN — AMPICILLIN SODIUM AND SULBACTAM SODIUM 3 G: 2; 1 INJECTION, POWDER, FOR SOLUTION INTRAMUSCULAR; INTRAVENOUS at 23:15

## 2017-06-27 RX ADMIN — IPRATROPIUM BROMIDE AND ALBUTEROL SULFATE 3 ML: .5; 3 SOLUTION RESPIRATORY (INHALATION) at 10:59

## 2017-06-27 RX ADMIN — AMPICILLIN SODIUM AND SULBACTAM SODIUM 3 G: 2; 1 INJECTION, POWDER, FOR SOLUTION INTRAMUSCULAR; INTRAVENOUS at 05:42

## 2017-06-27 RX ADMIN — IPRATROPIUM BROMIDE AND ALBUTEROL SULFATE 3 ML: .5; 3 SOLUTION RESPIRATORY (INHALATION) at 19:27

## 2017-06-27 RX ADMIN — GABAPENTIN 600 MG: 300 CAPSULE ORAL at 08:32

## 2017-06-27 RX ADMIN — FAMOTIDINE 20 MG: 20 TABLET, FILM COATED ORAL at 20:21

## 2017-06-27 RX ADMIN — HEPARIN SODIUM 5000 UNITS: 5000 INJECTION, SOLUTION INTRAVENOUS; SUBCUTANEOUS at 21:05

## 2017-06-27 RX ADMIN — METHYLPREDNISOLONE SODIUM SUCCINATE 62.5 MG: 125 INJECTION, POWDER, FOR SOLUTION INTRAMUSCULAR; INTRAVENOUS at 20:21

## 2017-06-27 RX ADMIN — PROPOFOL 40 MCG/KG/MIN: 10 INJECTION, EMULSION INTRAVENOUS at 02:54

## 2017-06-27 RX ADMIN — DULOXETINE HYDROCHLORIDE 60 MG: 60 CAPSULE, DELAYED RELEASE ORAL at 08:32

## 2017-06-27 RX ADMIN — OXYCODONE HYDROCHLORIDE 10 MG: 10 TABLET ORAL at 23:15

## 2017-06-27 RX ADMIN — INSULIN GLARGINE 25 UNITS: 100 INJECTION, SOLUTION SUBCUTANEOUS at 05:42

## 2017-06-27 ASSESSMENT — PAIN SCALES - GENERAL
PAINLEVEL_OUTOF10: 7
PAINLEVEL_OUTOF10: 0
PAINLEVEL_OUTOF10: 0
PAINLEVEL_OUTOF10: 8

## 2017-06-27 ASSESSMENT — LIFESTYLE VARIABLES
DO YOU DRINK ALCOHOL: NO
EVER_SMOKED: YES

## 2017-06-27 ASSESSMENT — COPD QUESTIONNAIRES
HAVE YOU SMOKED AT LEAST 100 CIGARETTES IN YOUR ENTIRE LIFE: YES
DURING THE PAST 4 WEEKS HOW MUCH DID YOU FEEL SHORT OF BREATH: SOME OF THE TIME
COPD SCREENING SCORE: 7
DO YOU EVER COUGH UP ANY MUCUS OR PHLEGM?: YES, EVERY DAY

## 2017-06-27 ASSESSMENT — PULMONARY FUNCTION TESTS: FVC: .86

## 2017-06-27 NOTE — PROGRESS NOTES
[facility] ICU Progress Note      Admit Date: 6/21/2017  ICU Day: 6     Resident(s): Carito Richards  Attending: HORACE RYAN/ Dr. Gonda    Date & Time:   6/27/2017   6:42 AM       Patient ID:    Name:             Priya Callahan   YOB: 1950  Age:                 66 y.o.  female   MRN:               [id number]    HPI:  65 y/o female admitted to ICU after intubation for AHRF 2/2 AECOPD.     Consultants:  PMA: Dr. Hope/Gonda    Interval Events:  06/22/2017: Remains on vent, starting tube feeds.  Bronch'd today with tan secretions.  Cultures NGTD.  Continue current ABX.  Hyperkalemia resolved.  Renal function stable. Start lantus 15u for elevated sugars.      06/23/17 : No significant overnight events. She is on Vent support /20/8/35% FiO2 Her ABG today 7.35/44/79/24.3 getting better. On Propofol , Pressors and TF @45.  Recieving Abx Zosyn. Her HBG was 6.7 in AM , 1U PRBC given. Will recheck at 4.30 PM. SBT trials, Mobilization and plans of Extubation if she tolerates SBT.    06/24/17: Agitated overnight, biting on tube, bite guard placed, O2 improved.  Good UOP, CVP: 17.  Repleted electrolytes.  Cultures negative, de-escalate ABX, stop vanco/zosyn, start unasyn.  DC PICC line.  Repeat procalcitonin.  Reduce steroid frequency from Q6H to Q8H.    Sugars uncontrolled, Lantus 25u QAM.  Tolerated 2 hours SBT yesterday, no SBT yet today.  Possibly extubate today.      6/25 - agitated overnight requiring mutilple doses of ativan.  Appeared very uncomfortable on decreased doses of sedation today with increasing RR and decreasing sats.  Propofol gtt resumed. Cultures still ngtd.    6/26 - home meds resumed overnight and BP and agitation slightly better; will attempt SBTs today; start free water flushes for mild hypernatremia; continue to wean steriods    6/27 - tolerated SBT x 1 hr yesterday, will attempt to extubate today; no ativan needed ovenright; continue to wean off precidex and propofol  as able; will complete unasyn today; BP stable on home meds; sugars high on SSI, lantus increased today; continue solumedrol today and transition to prednisone tomorrow; will increase lasix today    Review of Systems   Unable to perform ROS: intubated       PHYSICAL EXAM  Gen: NAD  HEENT: NC/AT, no scleral icterus, no conjunctival injection, mucous membranes pink and moist.  Neck: Supple, no lymphadenopathy.  Cardiac: S1S2, RRR, no m/r/g, no JVD.  Respiratory: Normal effort, symmetrical, symmetrical breath sounds; no rales; diffuse ronchi  Abdomen: BS+, soft, NT/ND, no rebound/guarding, no palpable organomegaly.  Ext: 2+ b/l pitting pedal edema, 2+ DP pulses b/l.  Skin: Warm, dry. No rashes or erythema.   Neuro: intubated and sedated, follows commands    Respiratory:  Ballard Vent Mode: APVCMV  Respiration: 20, Pulse Oximetry: 96 %    Chest Tube Drains:    Recent Labs      06/25/17   0413  06/26/17   0431  06/27/17   0422   ISTATAPH  7.392*  7.371*  7.357*   ISTATAPCO2  36.0  34.9  38.9*   ISTATAPO2  68  60*  63*   ISTATATCO2  23  21  23   [id number]  93  90*  91*   ISTATARTHCO3  21.9  20.2  21.8   ISTATARTBE  -3  -5*  -3   ISTATTEMP  97.5 F  98.6 F  96.6 F   ISTATFIO2  40  40  35   ISTATSPEC  Arterial  Arterial  Arterial   ISTATAPHTC  7.401  7.371*  7.373*   [id number]  65  60*  59*       HemoDynamics:  Pulse: (!) 50, Heart Rate (Monitored): (!) 53 NIBP: 158/67 mmHg CVP (mm Hg): (!) 12 MM HG    Neuro:      Fluids:        Intake/Output Summary (Last 24 hours) at 06/25/17 0700  Last data filed at 06/25/17 0600   Gross per 24 hour   Intake 2948.63 ml   Output   2680 ml   Net 268.63 ml       Weight: 105.3 kg (232 lb 2.3 oz)  Body mass index is 39.25 kg/(m^2).    Recent Labs      06/25/17   0531  06/26/17   0530  06/27/17   0541   SODIUM  146*  147*  145   POTASSIUM  3.8  4.1  4.1   CHLORIDE  115*  116*  114*   CO2  22  22  21   BUN  54*  64*  69*   CREATININE  1.05  1.11  1.13   MAGNESIUM  2.2   --    --     PHOSPHORUS  3.5   --    --    CALCIUM  9.1  9.1  8.9       GI/Nutrition:  Recent Labs      1731  1730  17   0541   ALTSGPT   --   15   --    ASTSGOT   --   9*   --    ALKPHOSPHAT   --   44   --    TBILIRUBIN   --   0.8   --    PREALBUMIN   --   21.0   --    GLUCOSE  162*  274*  338*       Heme:  Recent Labs      1731  17   0541   RBC  2.98*  2.77*  2.68*   HEMOGLOBIN  8.4*  7.8*  7.8*   HEMATOCRIT  27.9*  26.3*  26.0*   PLATELETCT  256  198  172       Infectious Disease:  Monitored Temp  Av.1 °C (96.9 °F)  Min: 35.7 °C (96.3 °F)  Max: 36.6 °C (97.9 °F)  Recent Labs      17   0541   WBC  13.3*  6.5  8.1   NEUTSPOLYS  92.00*  89.80*  90.30*   LYMPHOCYTES  2.70*  4.20*  2.60*   MONOCYTES  3.20  2.20  2.50   EOSINOPHILS  0.00  0.00  0.00   BASOPHILS  0.10  0.20  0.10   ASTSGOT   --   9*   --    ALTSGPT   --   15   --    ALKPHOSPHAT   --   44   --    TBILIRUBIN   --   0.8   --        Meds:  • furosemide  20 mg     • carvedilol  3.125 mg     • methylPREDNISolone  62.5 mg     • lorazepam  1 mg     • gabapentin  600 mg     • amlodipine  10 mg     • duloxetine  60 mg     • oxycodone immediate release  10 mg     • trazodone  300 mg     • famotidine  20 mg     • ampicillin-sulbactam (UNASYN) IV  3 g Stopped (17 0612)   • insulin glargine  25 Units     • dexmedetomidine (PRECEDEX) infusion 4 mcg/ml  0.1-1.5 mcg/kg/hr 0.5 mcg/kg/hr (17 0500)   • hydrALAZINE  10 mg     • hydrOXYzine  50 mg     • Respiratory Care per Protocol       • senna-docusate  2 Tab      And   • polyethylene glycol/lytes  1 Packet      And   • magnesium hydroxide  30 mL      And   • bisacodyl  10 mg     • chlorhexidine  15 mL     • lidocaine  1-2 mL     • MD ALERT...Adult ICU Electrolyte Replacement per Pharmacy Protocol       • fentaNYL  25 mcg      Or   • fentaNYL  50 mcg      Or   • fentaNYL  100 mcg     • ipratropium-albuterol  3 mL      • insulin lispro  2-9 Units     • glucose 4 g  16 g      And   • dextrose 50%  25 mL     • propofol  5-80 mcg/kg/min 40 mcg/kg/min (06/27/17 0254)   • heparin  5,000 Units     • levothyroxine  75 mcg     • montelukast  10 mg     • acetaminophen  650 mg     • ipratropium-albuterol  3 mL          Procedures:  6/22 - intuabation, bronchoscopy, and R IJV CVC placement    Imaging:  DX-CHEST-PORTABLE (1 VIEW)   Final Result         1.  Pulmonary edema and/or infiltrates are identified, which are stable since the prior exam.   2.  Cardiomegaly      DX-CHEST-PORTABLE (1 VIEW)   Final Result         1.  Pulmonary edema and/or infiltrates are identified, which appear somewhat increased since the prior exam.   2.  Cardiomegaly      DX-ABDOMEN FOR TUBE PLACEMENT   Final Result      Enteric tube has been placed and the tip projects over the stomach.      DX-CHEST-PORTABLE (1 VIEW)   Final Result      Findings consistent with pulmonary edema, with no significant change.      DX-CHEST-PORTABLE (1 VIEW)   Final Result      Stable chest appearance compared with 6/23.      DX-CHEST-PORTABLE (1 VIEW)   Final Result      Stable chest appearance compared with 6/22.      DX-ABDOMEN FOR TUBE PLACEMENT   Final Result      Feeding tube and nasogastric tube in place as noted above.      DX-CHEST-PORTABLE (1 VIEW)   Final Result      Right central venous line in place, with no pneumothorax. Improving pulmonary edema.      DX-CHEST-PORTABLE (1 VIEW)   Final Result      Mid and lower zone opacities, most consistent with pulmonary edema, overall worse compared with 6/13/2017.          Problem and Plan:    ACUTE HYPOXIC RESPIRATORY FAILURE  CHRONIC RESPIRATORY FAILURE  AECOPD  - CXR shows worsened bilateral LL infiltrates on admission; echo normal in 3/2017; LE US neg for DVT in 6/2017; CTPE neg in 6/2017; echo in 2015 shows normal EF with G2 diastolic dysfunction and pulmonary htn; likely 2/2 COPD exacerbation 2/2 HCAP; intubated and sedated;  RT protocol; solumedrol; abx as below; CXR stable, continue diuresis; attempt extubation today    SEPSIS 2/2 RESPIRATORY SOURCE  HCAP  LEUKOCYTOSIS  - multiple recent hospital admissions from nursing home; cultures from prior admission all neg; UA neg; CXR shows worsened infiltrates on admission; bl/urine/BAL cultures NGTD this admission; procalcitonin trending down; WBC normalized; continue unasyn to complete 7 day abx course; continue TF at goal    ACUTE ON CHRONIC NORMOCYTIC ANEMIA - s/p 1 U pRBC on 6/23; h/h stable; no e/o bleeding    HYPERKALEMIA - resolved    HYPERNATREMIA - mild; continue free water flushes     CKD-3 - stable at baseline; avoid nephrotoxins    DM2 WITH NEUROPATHY - a1c 5.2; Lantus and SSI with Accuchecks; hypoglycemia protocol; continue home gabapentin    HTN -  home amlodipine and coreg resumed; hydralazine prn; BP stable    CHRONIC HEP C - stable    FIBROMYALGIA - continue home trazodone and cymbalta and gabpentin    ASTHMA - continue monteleukast    HYPOTHYROIDISM - continue home synthroid      DISPO: ICU management of AHRF     CODE STATUS: FULL    Quality Measures:  Mckeon Catheter: yes  DVT Prophylaxis: heparin  Ulcer Prophylaxis: pepcid  Antibiotics: unasyn  Lines: R IJV CVC

## 2017-06-27 NOTE — FLOWSHEET NOTE
Ventilator Weaning Update    Patient is on vent day 7.  SBT was tolerated for a minimum of 1 hours on settings of 5/8.    Wean parameters for this SBT were:  #FVC / Vital Capacity (liters) : 0.86 (06/27/17 0752)  NIF (cm H2O) : -40 (06/27/17 0752)  Rapid Shallow Breathing Index (RR/VT): 43 (06/27/17 0752)  RR (bpm): 19 (06/27/17 0752)  Spontaneous VE: 8.8 (06/27/17 0752)  Spontaneous VT: 486 (06/27/17 0752)    Recommendation: continue weaning per protocol.  Events/Summary/Plan: SVN, SBT (06/27/17 0634).  SBT x 1hr

## 2017-06-27 NOTE — CARE PLAN
Problem: Bowel/Gastric:  Goal: Normal bowel function is maintained or improved  Outcome: PROGRESSING AS EXPECTED  Stool softeners held due to loose stool     Problem: Respiratory:  Goal: Respiratory status will improve  Outcome: PROGRESSING AS EXPECTED  Extubated at 1240, monitoring oxygen saturation. Patient wearing 5L oxygen delivered via oxymask

## 2017-06-27 NOTE — RESPIRATORY CARE
Extubation    Cuff leak noted yes  Stridor present no              Patient toleration yes  RCP Complete? yes  Events/Summary/Plan: extubated tolerating well. (06/27/17 1240)

## 2017-06-27 NOTE — PROGRESS NOTES
Pulmonary Critical Care Progress Note      Date of Service: 6/27/17    Chief Complaint: Shortness of Breath    History of Present Illness: This is a 66-year-old lady who has a history of chronic hypoxemic respiratory failure and chronic obstructive pulmonary disease.  She was hospitalized at [facility] from on or about May 7th to 05/10/2017, with an ankle fracture.  She was then readmitted to [facility] from on or about June 2nd to 06/09/2017, with ventilator dependent respiratory failure and healthcare-associated pneumonia.  She was subsequently sent to a skilled nursing facility.  She now comes back to the emergency room this evening because of increasing shortness of breath.  She was brought in here with wheezing and coarse crackles.  She was in respiratory distress.  She was intubated.  She is now on full mechanical ventilatory support.  Once again, she has been more residing at a skilled nursing facility.    ROS:    Respiratory: unable to perform due to the patient's inability to effectively communicate,   Cardiac: unable to perform due to the patient's inability to effectively communicate,   GI: unable to perform due to the patient's inability to effectively communicate.    All other systems negative.      Interval Events:  24 hour interval history reviewed   Oxy overnight for pain.   More awake and following today with good weaning parameters    PFSH:  No change.    Respiratory:  Ballard Vent Mode: APVCMV, Rate (breaths/min): 15, Vt Target (mL): 330, PEEP/CPAP: 8, FiO2: 45, Static Compliance (ml / cm H2O): 114, Control VTE (exp VT): 400  Pulse Oximetry: 96 %         SBT this AM with RSBI 43,  and NiF -40. No secretions.   Exam: Improved airflow bilaterally. Coarse rhonchi at bases with no wheezing.   ImagingCXR  I have personally reviewed the chest x-ray my impression is  enlarged cardiac silhouette, unchanged diffuse bilateral infiltrates. ET tube feeding tube, and RIJ in good position.     Recent Labs       06/25/17   0413  06/26/17   0431  06/27/17   0422   ISTATAPH  7.392*  7.371*  7.357*   ISTATAPCO2  36.0  34.9  38.9*   ISTATAPO2  68  60*  63*   ISTATATCO2  23  21  23   [id number]  93  90*  91*   ISTATARTHCO3  21.9  20.2  21.8   ISTATARTBE  -3  -5*  -3   ISTATTEMP  97.5 F  98.6 F  96.6 F   ISTATFIO2  40  40  35   ISTATSPEC  Arterial  Arterial  Arterial   ISTATAPHTC  7.401  7.371*  7.373*   [id number]  65  60*  59*   Blood gas shows: compensated metabolic acidosis with borderline oxygenation       HemoDynamics:  Pulse: (!) 50, Heart Rate (Monitored): (!) 53  NIBP: 158/67 mmHg  CVP (mm Hg): (!) 12 MM HG CVP 9-14  Exam: RRR, no murmur, 2+ edema to the legs and hands.   Imaging: Available data reviewed        Neuro:  GCS Total [place] Coma Score: 9  Propofol 40, Dex 0.5  Exam: Awake, somewhat agitated, follows commands purposefully, no focal deficits.   Imaging: Available data reviewed      Fluids:  Intake/Output       06/25/17 0700 - 06/26/17 0659 06/26/17 0700 - 06/27/17 0659 06/27/17 0700 - 06/28/17 0659      1288-6031 7257-7557 Total 1964-8468 8392-0402 Total 1899-4714 7193-6197 Total       Intake    I.V.  765.3  813.8 1579.1  896.5  848.5 1745  --  -- --    Precedex Volume 437 249.6 686.6 204.8 179.2 384 -- -- --    Propofol Volume 278.3 254.2 532.4 371.8 349.3 721 -- -- --    IV Volume (TKO) 50 110 160 120 120 240 -- -- --    IV Piggyback Volume (IV Piggyback) -- 200 200 200 200 400 -- -- --    Other  30  160 190  210  220 430  --  -- --    Medications (P.O./ Enteral Liquids) 30 160 190 90 160 250 -- -- --    Flush / Irrigation Volume (Rectal tube) -- -- -- 120 60 180 -- -- --    Enteral  190  600 790  850  830 1680  --  -- --    Enteral Volume 160 480 640  -- -- --    Free Water / Tube Flush 30 120 150 310 290 600 -- -- --    Total Intake 985.3 1573.8 2559.1 1956.5 1898.5 3855 -- -- --       Output    Urine  970  530 1500  855  1650 2505  --  -- --    Indwelling Cathether  855 1650 2505  -- -- --    Drains  --  -- --  0  -- 0  --  -- --    Residual Amount (ml) (Discarded) -- -- -- 0 -- 0 -- -- --    Stool/Urine  --  -- --  --  300 300  --  -- --    Measurable Stool (ml) -- -- -- -- 300 300 -- -- --    Stool  --  -- --  --  -- --  --  -- --    Number of Times Stooled -- 0 x 0 x 2 x -- 2 x -- -- --    Total Output  855 1950 2805 -- -- --       Net I/O     15.3 1043.8 1059.1 1101.5 -51.5 1050 -- -- --        Weight: 105.3 kg (232 lb 2.3 oz)  Recent Labs      1731  1730  17   0541   SODIUM  146*  147*  145   POTASSIUM  3.8  4.1  4.1   CHLORIDE  115*  116*  114*   CO2  22  22  21   BUN  54*  64*  69*   CREATININE  1.05  1.11  1.13   MAGNESIUM  2.2   --    --    PHOSPHORUS  3.5   --    --    CALCIUM  9.1  9.1  8.9       GI/Nutrition:  Exam: Abdomen is soft and NT, bowel sounds present and tolerating TF.   Imaging: Available data reviewed  NPO and tube feed Tolerated     Liver Function  Recent Labs      1731  1730  17   0541   ALTSGPT   --   15   --    ASTSGOT   --   9*   --    ALKPHOSPHAT   --   44   --    TBILIRUBIN   --   0.8   --    PREALBUMIN   --   21.0   --    GLUCOSE  162*  274*  338*       Heme:  Recent Labs      1731  17   0530  17   0541   RBC  2.98*  2.77*  2.68*   HEMOGLOBIN  8.4*  7.8*  7.8*   HEMATOCRIT  27.9*  26.3*  26.0*   PLATELETCT  256  198  172       Infectious Disease:  Monitored Temp  Av.1 °C (96.9 °F)  Min: 35.7 °C (96.3 °F)  Max: 36.6 °C (97.9 °F)   7 day course Unasyn completed   Cultures negative    Micro: reviewed  Recent Labs      17   0531  17   0530  17   0541   WBC  13.3*  6.5  8.1   NEUTSPOLYS  92.00*  89.80*  90.30*   LYMPHOCYTES  2.70*  4.20*  2.60*   MONOCYTES  3.20  2.20  2.50   EOSINOPHILS  0.00  0.00  0.00   BASOPHILS  0.10  0.20  0.10   ASTSGOT   --   9*   --    ALTSGPT   --   15   --    ALKPHOSPHAT   --   44   --    TBILIRUBIN   --   0.8   --       Current Facility-Administered Medications   Medication Dose Frequency Provider Last Rate Last Dose   • furosemide (LASIX) injection 20 mg  20 mg Q12HRS Jeremy M Gonda, M.D.   20 mg at 06/26/17 2256   • carvedilol (COREG) tablet 3.125 mg  3.125 mg BID WITH MEALS Carito Richards M.D.   Stopped at 06/26/17 1730   • methylPREDNISolone sod succ (SOLU-MEDROL) 125 MG injection 62.5 mg  62.5 mg Q12HRS Carito Richards M.D.   62.5 mg at 06/26/17 2256   • lorazepam (ATIVAN) injection 1 mg  1 mg Q HOUR PRN Angel Lema M.D.   1 mg at 06/25/17 0648   • gabapentin (NEURONTIN) capsule 600 mg  600 mg TID William Hope M.D.   600 mg at 06/26/17 2256   • amlodipine (NORVASC) tablet 10 mg  10 mg DAILY William Hope M.D.   10 mg at 06/26/17 0832   • duloxetine (CYMBALTA) capsule 60 mg  60 mg DAILY William Hope M.D.   60 mg at 06/26/17 0832   • oxycodone immediate release (ROXICODONE) tablet 10 mg  10 mg Q3HRS PRN William Hope M.D.       • trazodone (DESYREL) tablet 300 mg  300 mg Nightly William Hope M.D.   300 mg at 06/26/17 2256   • famotidine (PEPCID) tablet 20 mg  20 mg BID Angel Lema M.D.   20 mg at 06/26/17 2256   • ampicillin/sulbactam (UNASYN) 3 g in  mL IVPB  3 g Q6HRS Carito Richards M.D.   Stopped at 06/27/17 0612   • insulin glargine (LANTUS) injection 25 Units  25 Units QAM INSULIN William Hope M.D.   25 Units at 06/27/17 0542   • dexmedetomidine (PRECEDEX) 200 mcg in NS 50 mL infusion  0.1-1.5 mcg/kg/hr Continuous William Hope M.D. 13 mL/hr at 06/27/17 0500 0.5 mcg/kg/hr at 06/27/17 0500   • hydrALAZINE (APRESOLINE) injection 10 mg  10 mg Q4HRS PRN Carito Richards M.D.   10 mg at 06/26/17 0507   • hydrOXYzine (ATARAX) tablet 50 mg  50 mg TID PRN Carito Richards M.D.   50 mg at 06/25/17 0519   • Respiratory Care per Protocol   Continuous RT Angel Lema M.D.       • senna-docusate (PERICOLACE or SENOKOT S) 8.6-50 MG per  tablet 2 Tab  2 Tab BID Angel Lema M.D.   2 Tab at 06/26/17 2257    And   • polyethylene glycol/lytes (MIRALAX) PACKET 1 Packet  1 Packet QDAY PRN Angel Lema M.D.        And   • magnesium hydroxide (MILK OF MAGNESIA) suspension 30 mL  30 mL QDAY PRN Angel Lema M.D.        And   • bisacodyl (DULCOLAX) suppository 10 mg  10 mg QDAY PRN Angel Lema M.D.       • chlorhexidine (PERIDEX) 0.12 % solution 15 mL  15 mL BID Angel Lema M.D.   15 mL at 06/26/17 2256   • lidocaine (XYLOCAINE) 1%  injection  1-2 mL Q30 MIN PRN Angel Lema M.D.   2 mL at 06/23/17 2343   • MD ALERT...Adult ICU Electrolyte Replacement per Pharmacy Protocol   pharmacy to dose Angel Lema M.D.       • fentaNYL (SUBLIMAZE) injection 25 mcg  25 mcg Q HOUR PRN Angel Lema M.D.        Or   • fentaNYL (SUBLIMAZE) injection 50 mcg  50 mcg Q HOUR PRN Angel Lema M.D.   50 mcg at 06/24/17 2156    Or   • fentaNYL (SUBLIMAZE) injection 100 mcg  100 mcg Q HOUR PRN Angel Lema M.D.   100 mcg at 06/25/17 0102   • ipratropium-albuterol (DUONEB) nebulizer solution 3 mL  3 mL Q2HRS PRN (RT) Angel Lema M.D.       • insulin lispro (HUMALOG) injection 2-9 Units  2-9 Units Q6HRS Angel Lema M.D.   6 Units at 06/27/17 0542   • glucose 4 g chewable tablet 16 g  16 g Q15 MIN PRN Angel Lema M.D.        And   • dextrose 50% (D50W) injection 25 mL  25 mL Q15 MIN PRN Angel Lema M.D.   0 mL at 06/26/17 2124   • propofol (DIPRIVAN) injection  5-80 mcg/kg/min Continuous Angel Lema M.D. 25 mL/hr at 06/27/17 0254 40 mcg/kg/min at 06/27/17 0254   • heparin injection 5,000 Units  5,000 Units Q8HRS Angel Lema M.D.   5,000 Units at 06/27/17 0542   • levothyroxine (SYNTHROID) tablet 75 mcg  75 mcg AM KYREE Richards M.D.   75 mcg at 06/27/17 0542   • montelukast (SINGULAIR) tablet 10 mg  10 mg  DAILY Carito Ricahrds M.D.   10 mg at 06/26/17 0832   • acetaminophen (TYLENOL) tablet 650 mg  650 mg Q6HRS PRN Carito Richards M.D.       • ipratropium-albuterol (DUONEB) nebulizer solution 3 mL  3 mL 4X/DAY (RT) Angel Lema M.D.   3 mL at 06/27/17 0634     Last reviewed on 6/22/2017  2:52 PM by Rhea Cooper    Quality  Measures:  Labs reviewed, Medications reviewed and Radiology images reviewed  Mckeon catheter: Critically Ill - Requiring Accurate Measurement of Urinary Output and Unconscious / Sedated Patient on a Ventilator  Central line in place: Need for access    DVT Prophylaxis: Heparin  DVT prophylaxis - mechanical: SCDs  Ulcer prophylaxis: Yes  Antibiotics: Treating active infection/contamination beyond 24 hours perioperative coverage  Assessed for rehab: Patient unable to tolerate rehabilitation therapeutic regimen    Assessment/Plan:  Ventilator-dependent respiratory failure - intubated 6/21              - extubation trial today, encourage IS, OOB to chair if able   - rt/02 protocol   - limit sedatives, precedex until extubated   - diuresis  Acute exacerbation of chronic obstructive pulmonary disease.              - bds, steroids (wean again tomorrow)  Severe sepsis, pulmonary source              - s/p sepsis protocol              - s/p abx for 7d  Health-care-associated pneumonia              - completes unasyn today  Hyperactive delirium/Agitation              - continue home psych/pain meds  Acute blood loss Anemia - 1u prbcs 6/23  Acute pulmonary edema secondary to acute on chronic diastolic heart failure              - cont diuresis  Hyperglycemia - IDDM + steroids              - short/long-acting insulin (increased)  Pulmonary HTN w RVSP of 60    Obstructive sleep apnea - qhs CPAP after extubation  Acute on CKD - monitor, avoid nephrotoxins  Hypernatremia - free water  Prophylaxis, enteral nutrition    Discussed patient condition and risk of morbidity and/or mortality  with RN, RT, Pharmacy, [facility] resident, Charge nurse / hot rounds and Patient.    The patient remains critically ill.  Critical care time = 32 minutes in directly providing and coordinating critical care and extensive data review.  No time overlap and excludes procedures.      Rayne CLAY (Alla), am scribing for, and in the presence of, Jeremy Gonda M.D.   Electronically signed by: Rayne Mayorga (Alla), 6/27/2017  I, Jeremy Gonda M.D.  personally performed the services described in this documentation, as scribed by Rayne Mayorga in my presence, and it is both accurate and complete.

## 2017-06-27 NOTE — CARE PLAN
Problem: Communication  Goal: The ability to communicate needs accurately and effectively will improve  Intervention: [place] patient and significant other/support system to call light to alert staff of needs  Educated the pt on how to use the call light and to not attempt to get out of bed without the assistance of a staff member.   Intervention: Educate patient and significant other/support system about the plan of care, procedures, treatments, medications and allow for questions  Educated the pt on the POC.

## 2017-06-27 NOTE — CARE PLAN
Problem: Ventilation Defect:  Goal: Ability to achieve and maintain unassisted ventilation or tolerate decreased levels of ventilator support  Outcome: PROGRESSING AS EXPECTED  Adult Ventilation Update    Total Vent Days: 6      Patient Lines/Drains/Airways Status    Active Airway      Name: Placement date: Placement time: Site: Days:     Airway Group ET Tube Oral 8.0 06/21/17 2245  Oral  4                 In the last 24 hours, the patient tolerated SBT for 1hour on settings of 5/8.    #FVC / Vital Capacity (liters) : 0.81 (06/26/17 1524)  NIF (cm H2O) : -52 (06/26/17 1524)  Rapid Shallow Breathing Index (RR/VT): 34 (06/26/17 1524)  Plateau Pressure (Q Shift): 14 (06/25/17 0705)  Static Compliance (ml / cm H2O): 98 (06/26/17 1524)    Patient failed trials because of Barriers to Wean: Continuous Propofol Infusion (06/26/17 0634),this AM.  Barriers to SBT Weaning Trial Stopped due to:: Pt weaned for 1 hour and returned to rest settings per protocol (06/26/17 1524)  Length of Weaning Trial Length of Weaning Trial (Hours): 1 (06/26/17 1524)    The patient is currently in active wean according to protocol.    Sputum/Suction ETT  Cough: Strong (06/26/17 1600)  Sputum Amount: Small (06/26/17 1600)  Sputum Color: Clear;White (06/26/17 1600)  Sputum Consistency: Thin (06/26/17 1600)    Mobility Group  Activity Performed: Unable to mobilize (06/26/17 0800)  Pt Calls for Assistance: No (06/26/17 0800)  Reason Not Mobilized: Other (comment) (06/26/17 0800)  Mobilization Comments: Patient extremely anxious/agitated when turning and when on sedation vacay (06/26/17 0800)    Events/Summary/Plan: SVN, SBT (06/26/17 1417). Patient tolerated weaning this shift and did not get anxious. Did not wean Propofol with weaning. Also weaned RR on vent

## 2017-06-28 ENCOUNTER — APPOINTMENT (OUTPATIENT)
Dept: RADIOLOGY | Facility: MEDICAL CENTER | Age: 67
End: 2017-06-28
Attending: FAMILY MEDICINE
Payer: MEDICARE

## 2017-06-28 ENCOUNTER — APPOINTMENT (OUTPATIENT)
Dept: RADIOLOGY | Facility: MEDICAL CENTER | Age: 67
DRG: 870 | End: 2017-06-28
Attending: INTERNAL MEDICINE
Payer: MEDICARE

## 2017-06-28 LAB
ANION GAP SERPL CALC-SCNC: 9 MMOL/L (ref 0–11.9)
BASE EXCESS BLDA CALC-SCNC: -1 MMOL/L (ref -4–3)
BASOPHILS # BLD AUTO: 0.2 % (ref 0–1.8)
BASOPHILS # BLD: 0.03 K/UL (ref 0–0.12)
BODY TEMPERATURE: ABNORMAL DEGREES
BUN SERPL-MCNC: 71 MG/DL (ref 8–22)
CALCIUM SERPL-MCNC: 9.2 MG/DL (ref 8.5–10.5)
CHLORIDE SERPL-SCNC: 114 MMOL/L (ref 96–112)
CO2 BLDA-SCNC: 25 MMOL/L (ref 20–33)
CO2 SERPL-SCNC: 25 MMOL/L (ref 20–33)
CREAT SERPL-MCNC: 1.08 MG/DL (ref 0.5–1.4)
EOSINOPHIL # BLD AUTO: 0 K/UL (ref 0–0.51)
EOSINOPHIL NFR BLD: 0 % (ref 0–6.9)
ERYTHROCYTE [DISTWIDTH] IN BLOOD BY AUTOMATED COUNT: 63.7 FL (ref 35.9–50)
GFR SERPL CREATININE-BSD FRML MDRD: 50 ML/MIN/1.73 M 2
GLUCOSE BLD-MCNC: 213 MG/DL (ref 65–99)
GLUCOSE BLD-MCNC: 233 MG/DL (ref 65–99)
GLUCOSE BLD-MCNC: 239 MG/DL (ref 65–99)
GLUCOSE SERPL-MCNC: 249 MG/DL (ref 65–99)
HCO3 BLDA-SCNC: 23.9 MMOL/L (ref 17–25)
HCT VFR BLD AUTO: 28.3 % (ref 37–47)
HGB BLD-MCNC: 8.5 G/DL (ref 12–16)
IMM GRANULOCYTES # BLD AUTO: 0.78 K/UL (ref 0–0.11)
IMM GRANULOCYTES NFR BLD AUTO: 4 % (ref 0–0.9)
LYMPHOCYTES # BLD AUTO: 0.46 K/UL (ref 1–4.8)
LYMPHOCYTES NFR BLD: 2.4 % (ref 22–41)
MCH RBC QN AUTO: 28.6 PG (ref 27–33)
MCHC RBC AUTO-ENTMCNC: 30 G/DL (ref 33.6–35)
MCV RBC AUTO: 95.3 FL (ref 81.4–97.8)
MONOCYTES # BLD AUTO: 0.83 K/UL (ref 0–0.85)
MONOCYTES NFR BLD AUTO: 4.3 % (ref 0–13.4)
NEUTROPHILS # BLD AUTO: 17.37 K/UL (ref 2–7.15)
NEUTROPHILS NFR BLD: 89.1 % (ref 44–72)
NRBC # BLD AUTO: 0.08 K/UL
NRBC BLD AUTO-RTO: 0.4 /100 WBC
O2/TOTAL GAS SETTING VFR VENT: 40 %
PCO2 BLDA: 38.9 MMHG (ref 26–37)
PCO2 TEMP ADJ BLDA: 36.7 MMHG (ref 26–37)
PH BLDA: 7.4 [PH] (ref 7.4–7.5)
PH TEMP ADJ BLDA: 7.42 [PH] (ref 7.4–7.5)
PLATELET # BLD AUTO: 286 K/UL (ref 164–446)
PMV BLD AUTO: 9.7 FL (ref 9–12.9)
PO2 BLDA: 78 MMHG (ref 64–87)
PO2 TEMP ADJ BLDA: 71 MMHG (ref 64–87)
POTASSIUM SERPL-SCNC: 4 MMOL/L (ref 3.6–5.5)
RBC # BLD AUTO: 2.97 M/UL (ref 4.2–5.4)
SAO2 % BLDA: 95 % (ref 93–99)
SODIUM SERPL-SCNC: 148 MMOL/L (ref 135–145)
SPECIMEN DRAWN FROM PATIENT: ABNORMAL
WBC # BLD AUTO: 19.5 K/UL (ref 4.8–10.8)

## 2017-06-28 PROCEDURE — A9270 NON-COVERED ITEM OR SERVICE: HCPCS | Performed by: INTERNAL MEDICINE

## 2017-06-28 PROCEDURE — 36600 WITHDRAWAL OF ARTERIAL BLOOD: CPT

## 2017-06-28 PROCEDURE — 97167 OT EVAL HIGH COMPLEX 60 MIN: CPT

## 2017-06-28 PROCEDURE — 700111 HCHG RX REV CODE 636 W/ 250 OVERRIDE (IP): Performed by: INTERNAL MEDICINE

## 2017-06-28 PROCEDURE — 82962 GLUCOSE BLOOD TEST: CPT | Mod: 91

## 2017-06-28 PROCEDURE — G8996 SWALLOW CURRENT STATUS: HCPCS | Mod: CL

## 2017-06-28 PROCEDURE — G8988 SELF CARE GOAL STATUS: HCPCS | Mod: CJ

## 2017-06-28 PROCEDURE — 700101 HCHG RX REV CODE 250: Performed by: INTERNAL MEDICINE

## 2017-06-28 PROCEDURE — G8979 MOBILITY GOAL STATUS: HCPCS | Mod: CJ

## 2017-06-28 PROCEDURE — G8987 SELF CARE CURRENT STATUS: HCPCS | Mod: CL

## 2017-06-28 PROCEDURE — 770022 HCHG ROOM/CARE - ICU (200)

## 2017-06-28 PROCEDURE — 700102 HCHG RX REV CODE 250 W/ 637 OVERRIDE(OP): Performed by: INTERNAL MEDICINE

## 2017-06-28 PROCEDURE — 71010 DX-CHEST-PORTABLE (1 VIEW): CPT

## 2017-06-28 PROCEDURE — 94640 AIRWAY INHALATION TREATMENT: CPT

## 2017-06-28 PROCEDURE — 92610 EVALUATE SWALLOWING FUNCTION: CPT

## 2017-06-28 PROCEDURE — 82803 BLOOD GASES ANY COMBINATION: CPT

## 2017-06-28 PROCEDURE — G8997 SWALLOW GOAL STATUS: HCPCS | Mod: CI

## 2017-06-28 PROCEDURE — 97161 PT EVAL LOW COMPLEX 20 MIN: CPT

## 2017-06-28 PROCEDURE — 700105 HCHG RX REV CODE 258: Performed by: INTERNAL MEDICINE

## 2017-06-28 PROCEDURE — 80048 BASIC METABOLIC PNL TOTAL CA: CPT

## 2017-06-28 PROCEDURE — 85025 COMPLETE CBC W/AUTO DIFF WBC: CPT

## 2017-06-28 PROCEDURE — G8978 MOBILITY CURRENT STATUS: HCPCS | Mod: CL

## 2017-06-28 RX ORDER — MORPHINE SULFATE 4 MG/ML
2 INJECTION, SOLUTION INTRAMUSCULAR; INTRAVENOUS EVERY 4 HOURS PRN
Status: DISCONTINUED | OUTPATIENT
Start: 2017-06-28 | End: 2017-07-03 | Stop reason: HOSPADM

## 2017-06-28 RX ORDER — PREDNISONE 20 MG/1
40 TABLET ORAL DAILY
Status: DISCONTINUED | OUTPATIENT
Start: 2017-06-28 | End: 2017-07-03 | Stop reason: HOSPADM

## 2017-06-28 RX ORDER — FUROSEMIDE 10 MG/ML
40 INJECTION INTRAMUSCULAR; INTRAVENOUS 3 TIMES DAILY
Status: DISCONTINUED | OUTPATIENT
Start: 2017-06-28 | End: 2017-07-01

## 2017-06-28 RX ORDER — OXYCODONE HYDROCHLORIDE 10 MG/1
10 TABLET ORAL EVERY 4 HOURS PRN
Status: DISCONTINUED | OUTPATIENT
Start: 2017-06-28 | End: 2017-07-03 | Stop reason: HOSPADM

## 2017-06-28 RX ORDER — OXYCODONE HYDROCHLORIDE 5 MG/1
5 TABLET ORAL EVERY 4 HOURS PRN
Status: DISCONTINUED | OUTPATIENT
Start: 2017-06-28 | End: 2017-07-03 | Stop reason: HOSPADM

## 2017-06-28 RX ORDER — OXYCODONE HYDROCHLORIDE 5 MG/1
5-10 TABLET ORAL EVERY 4 HOURS PRN
Status: DISCONTINUED | OUTPATIENT
Start: 2017-06-28 | End: 2017-06-28

## 2017-06-28 RX ORDER — LORAZEPAM 2 MG/ML
1 INJECTION INTRAMUSCULAR EVERY 6 HOURS PRN
Status: DISCONTINUED | OUTPATIENT
Start: 2017-06-28 | End: 2017-07-03 | Stop reason: HOSPADM

## 2017-06-28 RX ADMIN — CARVEDILOL 3.12 MG: 6.25 TABLET, FILM COATED ORAL at 20:10

## 2017-06-28 RX ADMIN — HYDRALAZINE HYDROCHLORIDE 10 MG: 20 INJECTION INTRAMUSCULAR; INTRAVENOUS at 12:12

## 2017-06-28 RX ADMIN — OXYCODONE HYDROCHLORIDE 10 MG: 10 TABLET ORAL at 08:34

## 2017-06-28 RX ADMIN — FUROSEMIDE 40 MG: 10 INJECTION, SOLUTION INTRAMUSCULAR; INTRAVENOUS at 08:33

## 2017-06-28 RX ADMIN — TRAZODONE HYDROCHLORIDE 300 MG: 150 TABLET ORAL at 20:10

## 2017-06-28 RX ADMIN — INSULIN LISPRO 3 UNITS: 100 INJECTION, SOLUTION INTRAVENOUS; SUBCUTANEOUS at 06:07

## 2017-06-28 RX ADMIN — IPRATROPIUM BROMIDE AND ALBUTEROL SULFATE 3 ML: .5; 3 SOLUTION RESPIRATORY (INHALATION) at 18:42

## 2017-06-28 RX ADMIN — IPRATROPIUM BROMIDE AND ALBUTEROL SULFATE 3 ML: .5; 3 SOLUTION RESPIRATORY (INHALATION) at 10:55

## 2017-06-28 RX ADMIN — GABAPENTIN 600 MG: 300 CAPSULE ORAL at 14:49

## 2017-06-28 RX ADMIN — DULOXETINE HYDROCHLORIDE 60 MG: 60 CAPSULE, DELAYED RELEASE ORAL at 09:30

## 2017-06-28 RX ADMIN — HEPARIN SODIUM 5000 UNITS: 5000 INJECTION, SOLUTION INTRAVENOUS; SUBCUTANEOUS at 06:03

## 2017-06-28 RX ADMIN — GABAPENTIN 600 MG: 300 CAPSULE ORAL at 20:10

## 2017-06-28 RX ADMIN — INSULIN GLARGINE 30 UNITS: 100 INJECTION, SOLUTION SUBCUTANEOUS at 06:07

## 2017-06-28 RX ADMIN — IPRATROPIUM BROMIDE AND ALBUTEROL SULFATE 3 ML: .5; 3 SOLUTION RESPIRATORY (INHALATION) at 14:08

## 2017-06-28 RX ADMIN — HEPARIN SODIUM 5000 UNITS: 5000 INJECTION, SOLUTION INTRAVENOUS; SUBCUTANEOUS at 22:06

## 2017-06-28 RX ADMIN — HYDRALAZINE HYDROCHLORIDE 10 MG: 20 INJECTION INTRAMUSCULAR; INTRAVENOUS at 01:05

## 2017-06-28 RX ADMIN — OXYCODONE HYDROCHLORIDE 10 MG: 10 TABLET ORAL at 21:39

## 2017-06-28 RX ADMIN — INSULIN LISPRO 3 UNITS: 100 INJECTION, SOLUTION INTRAVENOUS; SUBCUTANEOUS at 11:38

## 2017-06-28 RX ADMIN — CARVEDILOL 3.12 MG: 6.25 TABLET, FILM COATED ORAL at 08:34

## 2017-06-28 RX ADMIN — GABAPENTIN 600 MG: 300 CAPSULE ORAL at 08:34

## 2017-06-28 RX ADMIN — IPRATROPIUM BROMIDE AND ALBUTEROL SULFATE 3 ML: .5; 3 SOLUTION RESPIRATORY (INHALATION) at 07:22

## 2017-06-28 RX ADMIN — AMPICILLIN SODIUM AND SULBACTAM SODIUM 3 G: 2; 1 INJECTION, POWDER, FOR SOLUTION INTRAMUSCULAR; INTRAVENOUS at 18:24

## 2017-06-28 RX ADMIN — PREDNISONE 40 MG: 20 TABLET ORAL at 08:33

## 2017-06-28 RX ADMIN — AMLODIPINE BESYLATE 10 MG: 5 TABLET ORAL at 08:34

## 2017-06-28 RX ADMIN — HEPARIN SODIUM 5000 UNITS: 5000 INJECTION, SOLUTION INTRAVENOUS; SUBCUTANEOUS at 14:48

## 2017-06-28 RX ADMIN — AMPICILLIN SODIUM AND SULBACTAM SODIUM 3 G: 2; 1 INJECTION, POWDER, FOR SOLUTION INTRAMUSCULAR; INTRAVENOUS at 11:41

## 2017-06-28 RX ADMIN — LORAZEPAM 1 MG: 2 INJECTION INTRAMUSCULAR; INTRAVENOUS at 01:05

## 2017-06-28 RX ADMIN — LEVOTHYROXINE SODIUM 75 MCG: 75 TABLET ORAL at 06:03

## 2017-06-28 RX ADMIN — MONTELUKAST SODIUM 10 MG: 10 TABLET, FILM COATED ORAL at 10:12

## 2017-06-28 RX ADMIN — FUROSEMIDE 40 MG: 10 INJECTION, SOLUTION INTRAMUSCULAR; INTRAVENOUS at 14:48

## 2017-06-28 RX ADMIN — FUROSEMIDE 40 MG: 10 INJECTION, SOLUTION INTRAMUSCULAR; INTRAVENOUS at 20:10

## 2017-06-28 RX ADMIN — INSULIN LISPRO 3 UNITS: 100 INJECTION, SOLUTION INTRAVENOUS; SUBCUTANEOUS at 18:24

## 2017-06-28 RX ADMIN — AMPICILLIN SODIUM AND SULBACTAM SODIUM 3 G: 2; 1 INJECTION, POWDER, FOR SOLUTION INTRAMUSCULAR; INTRAVENOUS at 06:03

## 2017-06-28 ASSESSMENT — PAIN SCALES - GENERAL
PAINLEVEL_OUTOF10: 0
PAINLEVEL_OUTOF10: 3
PAINLEVEL_OUTOF10: 0
PAINLEVEL_OUTOF10: 7
PAINLEVEL_OUTOF10: 0
PAINLEVEL_OUTOF10: 8
PAINLEVEL_OUTOF10: 5
PAINLEVEL_OUTOF10: 0
PAINLEVEL_OUTOF10: 0
PAINLEVEL_OUTOF10: 4

## 2017-06-28 ASSESSMENT — COGNITIVE AND FUNCTIONAL STATUS - GENERAL
SUGGESTED CMS G CODE MODIFIER DAILY ACTIVITY: CL
DRESSING REGULAR UPPER BODY CLOTHING: A LOT
HELP NEEDED FOR BATHING: A LOT
WALKING IN HOSPITAL ROOM: TOTAL
MOBILITY SCORE: 10
TOILETING: A LOT
DAILY ACTIVITIY SCORE: 13
TURNING FROM BACK TO SIDE WHILE IN FLAT BAD: A LITTLE
SUGGESTED CMS G CODE MODIFIER MOBILITY: CL
DRESSING REGULAR LOWER BODY CLOTHING: A LOT
STANDING UP FROM CHAIR USING ARMS: A LOT
EATING MEALS: A LITTLE
PERSONAL GROOMING: A LOT
MOVING TO AND FROM BED TO CHAIR: A LOT
MOVING FROM LYING ON BACK TO SITTING ON SIDE OF FLAT BED: UNABLE
CLIMB 3 TO 5 STEPS WITH RAILING: TOTAL

## 2017-06-28 ASSESSMENT — ENCOUNTER SYMPTOMS
BRUISES/BLEEDS EASILY: 1
SHORTNESS OF BREATH: 1
LOSS OF CONSCIOUSNESS: 0
NAUSEA: 0
COUGH: 0
HEADACHES: 0
ABDOMINAL PAIN: 0
SPUTUM PRODUCTION: 0
FEVER: 0
CONSTIPATION: 0
SORE THROAT: 1
WHEEZING: 0
PALPITATIONS: 0
FOCAL WEAKNESS: 0
VOMITING: 0
CHILLS: 0

## 2017-06-28 ASSESSMENT — GAIT ASSESSMENTS: GAIT LEVEL OF ASSIST: UNABLE TO PARTICIPATE

## 2017-06-28 ASSESSMENT — ACTIVITIES OF DAILY LIVING (ADL): TOILETING: INDEPENDENT

## 2017-06-28 NOTE — PROGRESS NOTES
TRANSFER SUMMARY:    Ms. Callahan is a 67 y/o female with COPD on home O2, DM with neuropathy, CKD-3, asthma, hypothyroidism, chronic hep C, fibromyalgia, and multiple recent hospital admissions (intubated during previous admission as well) who was brought in from [facility] after being found dyspneic and hypoxic.  She was intubated in ER and admitted to ICU for AonCHRF 2/2 severe sepsis and AECOPD 2/2 HCAP.  She was weaned off pressors and treated with vancomycin and zosyn initially, and then de-escalated to Unasyn of which she will finish a 7 day course today.  All cultures have shown NGTD.  She has also been weaned off solumedrol to prednisone today.  She was successfully extubated on 6/27 and is tolerating 5L O2 currently and is stable for transfer to floor today. SLP/PT/OT are on board and patient is currently still NPO with TF running at goal.  She is also being diuresed for + fluid balance.    For further details, please refer to ICU progress note.    Case discussed with [facility] who have accepted patient for transfer.

## 2017-06-28 NOTE — CARE PLAN
Problem: Nutritional:  Goal: Nutrition support tolerated and meeting greater than 85% of estimated needs  Outcome: MET Date Met:  06/28/17

## 2017-06-28 NOTE — PROGRESS NOTES
Pulmonary Critical Care Progress Note      Date of Service: 6/28/17    Chief Complaint: Shortness of Breath    History of Present Illness: This is a 66-year-old lady who has a history of chronic hypoxemic respiratory failure and chronic obstructive pulmonary disease.  She was hospitalized at [facility] from on or about May 7th to 05/10/2017, with an ankle fracture.  She was then readmitted to [facility] from on or about June 2nd to 06/09/2017, with ventilator dependent respiratory failure and healthcare-associated pneumonia.  She was subsequently sent to a skilled nursing facility.  She now comes back to the emergency room this evening because of increasing shortness of breath.  She was brought in here with wheezing and coarse crackles.  She was in respiratory distress.  She was intubated.  She is now on full mechanical ventilatory support.  Once again, she has been more residing at a skilled nursing facility.    ROS:    Respiratory: positive cough, negative shortness of breath and negative sputum production,   Cardiac: negative chest pain and negative palpitations,   GI: negative nausea, negative vomiting and negative abdominal pain.    All other systems negative.      Interval Events:  24 hour interval history reviewed   Extubated yesterday.   Slightly more confused since yesterday, more non verbal.   Hydralazine X1 overnight.   Failed swallow evaluation this AM.     PFSH:  No change.      Respiratory:     Pulse Oximetry: 96 % 5L Facemask         Exam: Few coarse crackles at the bases, breathing comfortably on facemask. Moderately strong cough with minimal secretions.   ImagingCXR  I have personally reviewed the chest x-ray my impression is  enlarged cardiac silhouette, unchanged diffuse bilateral infiltrates and moderate pulmonary edema. Feeding tube and RIJ in good position.     Recent Labs      06/26/17   0431  06/27/17   0422   ISTATAPH  7.371*  7.357*   ISTATAPCO2  34.9  38.9*   ISTATAPO2  60*  63*   ISTATATCO2  21   23   [id number]  90*  91*   ISTATARTHCO3  20.2  21.8   ISTATARTBE  -5*  -3   ISTATTEMP  98.6 F  96.6 F   ISTATFIO2  40  35   ISTATSPEC  Arterial  Arterial   ISTATAPHTC  7.371*  7.373*   [id number]  60*  59*   Blood gas shows: none today       HemoDynamics:  Pulse: 75, Heart Rate (Monitored): 73  NIBP: 112/51 mmHg  CVP (mm Hg): 4 MM HG CVP 9-14  Exam: RRR, no murmur, 2+ edema to the legs and hands.   Imaging: Available data reviewed        Neuro:  GCS Total [place] Coma Score: 9  Propofol 40, Dex 0.5  Exam: Confused. Repeats answers to questions. PERRL. No focal deficits. Generally weak.   Imaging: Available data reviewed      Fluids:  Intake/Output       06/26/17 0700 - 06/27/17 0659 06/27/17 0700 - 06/28/17 0659 06/28/17 0700 - 06/29/17 0659      3700-1695 5602-1325 Total 2689-4204 5132-8422 Total 7897-6698 0259-0047 Total       Intake    I.V.  896.5  848.5 1745  591.6  320 911.6  --  -- --    Precedex Volume 204.8 179.2 384 150.8 0 150.8 -- -- --    Propofol Volume 371.8 349.3 721 130.8 0 130.8 -- -- --    IV Volume (TKO) 120 120 240 110 120 230 -- -- --    IV Piggyback Volume (IV Piggyback) 200 200 400 200 200 400 -- -- --    Other  210  220 430  255  100 355  --  -- --    Medications (P.O./ Enteral Liquids) 90 160 250 135 100 235 -- -- --    Flush / Irrigation Volume (Rectal tube) 120 60 180 120 -- 120 -- -- --    Enteral  850  830 1680  940  800 1740  --  -- --    Enteral Volume   -- -- --    Free Water / Tube Flush 310 290 600 400 260 660 -- -- --    Total Intake 1956.5 1898.5 3855 1786.6 1220 3006.6 -- -- --       Output    Urine  855  1650 2505  2050  1500 3550  --  -- --    Indwelling Cathether 855 1650 2505 2050 1500 3550 -- -- --    Drains  0  -- 0  --  -- --  --  -- --    Residual Amount (ml) (Discarded) 0 -- 0 -- -- -- -- -- --    Stool/Urine  --  300 300  --  -- --  --  -- --    Measurable Stool (ml) -- 300 300 -- -- -- -- -- --    Stool  --  -- --  --  -- --  --  -- --     Number of Times Stooled 2 x -- 2 x 2 x 1 x 3 x -- -- --    Total Output 855 1950 2805 2050 1500 3550 -- -- --       Net I/O     1101.5 -51.5 1050 -263.4 -280 -543.4 -- -- --        Weight: 103.6 kg (228 lb 6.3 oz)  Recent Labs      17   0517   0541   SODIUM  147*  145   POTASSIUM  4.1  4.1   CHLORIDE  116*  114*   CO2  22  21   BUN  64*  69*   CREATININE  1.11  1.13   CALCIUM  9.1  8.9       GI/Nutrition:  Exam: Abdomen is obese and soft, NT, bowel sounds present and tolerating TF.   Imaging: Available data reviewed  NPO and tube feed Tolerated     Liver Function  Recent Labs      1730  17   0541   ALTSGPT  15   --    ASTSGOT  9*   --    ALKPHOSPHAT  44   --    TBILIRUBIN  0.8   --    PREALBUMIN  21.0   --    GLUCOSE  274*  338*       Heme:  Recent Labs      17   0541   RBC  2.77*  2.68*   HEMOGLOBIN  7.8*  7.8*   HEMATOCRIT  26.3*  26.0*   PLATELETCT  198  172       Infectious Disease:  Monitored Temp  Av.4 °C (97.6 °F)  Min: 35.7 °C (96.3 °F)  Max: 36.8 °C (98.2 °F)   7 day course Unasyn completed   Cultures negative    Micro: reviewed  Recent Labs      1730  17   0541   WBC  6.5  8.1   NEUTSPOLYS  89.80*  90.30*   LYMPHOCYTES  4.20*  2.60*   MONOCYTES  2.20  2.50   EOSINOPHILS  0.00  0.00   BASOPHILS  0.20  0.10   ASTSGOT  9*   --    ALTSGPT  15   --    ALKPHOSPHAT  44   --    TBILIRUBIN  0.8   --      Current Facility-Administered Medications   Medication Dose Frequency Provider Last Rate Last Dose   • predniSONE (DELTASONE) tablet 40 mg  40 mg DAILY Carito Richards M.D.       • lorazepam (ATIVAN) injection 1 mg  1 mg Q6HRS PRN Carito Richards M.D.       • oxycodone immediate-release (ROXICODONE) tablet 5 mg  5 mg Q4HRS PRN Carito Richards M.D.        Or   • oxycodone immediate release (ROXICODONE) tablet 10 mg  10 mg Q4HRS PRN Carito Richards M.D.       • insulin glargine (LANTUS) injection 30 Units  30  Units QAM INSULIN Carito Richards M.D.   30 Units at 06/28/17 0607   • furosemide (LASIX) injection 40 mg  40 mg Q12HRS Jeremy M Gonda, M.D.   40 mg at 06/27/17 2021   • carvedilol (COREG) tablet 3.125 mg  3.125 mg BID WITH MEALS Carito Richards M.D.   3.125 mg at 06/27/17 1758   • gabapentin (NEURONTIN) capsule 600 mg  600 mg TID William Hope M.D.   600 mg at 06/27/17 2021   • amlodipine (NORVASC) tablet 10 mg  10 mg DAILY William Hope M.D.   10 mg at 06/27/17 0832   • duloxetine (CYMBALTA) capsule 60 mg  60 mg DAILY William Hope M.D.   60 mg at 06/27/17 0832   • trazodone (DESYREL) tablet 300 mg  300 mg Nightly William Hope M.D.   300 mg at 06/27/17 2021   • ampicillin/sulbactam (UNASYN) 3 g in  mL IVPB  3 g Q6HRS Carito Richards M.D. 200 mL/hr at 06/28/17 0603 3 g at 06/28/17 0603   • hydrALAZINE (APRESOLINE) injection 10 mg  10 mg Q4HRS PRN Carito Richards M.D.   10 mg at 06/28/17 0105   • hydrOXYzine (ATARAX) tablet 50 mg  50 mg TID PRN Carito Richards M.D.   50 mg at 06/27/17 2315   • Respiratory Care per Protocol   Continuous RT Angel Lema M.D.       • senna-docusate (PERICOLACE or SENOKOT S) 8.6-50 MG per tablet 2 Tab  2 Tab BID Angel Lema M.D.   Stopped at 06/27/17 0900    And   • polyethylene glycol/lytes (MIRALAX) PACKET 1 Packet  1 Packet QDAY PRN Angel Lema M.D.        And   • magnesium hydroxide (MILK OF MAGNESIA) suspension 30 mL  30 mL QDAY PRN Angel Lema M.D.        And   • bisacodyl (DULCOLAX) suppository 10 mg  10 mg QDAY PRN Angel Lema M.D.       • chlorhexidine (PERIDEX) 0.12 % solution 15 mL  15 mL BID Angel Lema M.D.   15 mL at 06/27/17 2021   • lidocaine (XYLOCAINE) 1%  injection  1-2 mL Q30 MIN PRN Angel Lema M.D.   2 mL at 06/23/17 2343   • ipratropium-albuterol (DUONEB) nebulizer solution 3 mL  3 mL Q2HRS PRN (RT) Angel Lema M.D.       •  insulin lispro (HUMALOG) injection 2-9 Units  2-9 Units Q6HRS Angel Lema M.D.   3 Units at 06/28/17 0607   • glucose 4 g chewable tablet 16 g  16 g Q15 MIN PRN Angel Lema M.D.        And   • dextrose 50% (D50W) injection 25 mL  25 mL Q15 MIN PRN Angel Lema M.D.   0 mL at 06/26/17 2124   • heparin injection 5,000 Units  5,000 Units Q8HRS Angel Lema M.D.   5,000 Units at 06/28/17 0603   • levothyroxine (SYNTHROID) tablet 75 mcg  75 mcg AM ES Carito Richards M.D.   75 mcg at 06/28/17 0603   • montelukast (SINGULAIR) tablet 10 mg  10 mg DAILY Carito Richards M.D.   10 mg at 06/27/17 0832   • acetaminophen (TYLENOL) tablet 650 mg  650 mg Q6HRS PRN Carito Richards M.D.       • ipratropium-albuterol (DUONEB) nebulizer solution 3 mL  3 mL 4X/DAY (RT) Angel Lema M.D.   3 mL at 06/27/17 1927     Last reviewed on 6/22/2017  2:52 PM by Rhea Cooper    Quality  Measures:  Labs reviewed, Medications reviewed and Radiology images reviewed  Mckeon catheter: Critically Ill - Requiring Accurate Measurement of Urinary Output  Central line in place: Need for access    DVT Prophylaxis: Heparin  DVT prophylaxis - mechanical: SCDs  Ulcer prophylaxis: Yes  Antibiotics: Treating active infection/contamination beyond 24 hours perioperative coverage  Assessed for rehab: Patient was assess for and/or received rehabilitation services during this hospitalization      Assessment/Plan:  Ventilator-dependent respiratory failure - intubated 6/21-6/27              - encourage IS/PEP, OOB to chair   - rt/02 protocol   - increase diuresis  Acute exacerbation of chronic obstructive pulmonary disease.              - bds, steroids (wean to PO)  Severe sepsis, pulmonary source              - s/p sepsis protocol              - s/p abx for 7d  Health-care-associated pneumonia              - completed unasyn  Hyperactive delirium/Agitation              - continue home  psych/pain meds   - re-orient, mobilization   - check ABG to assess for hypercapnea  Acute blood loss Anemia - 1u prbcs 6/23  Acute pulmonary edema secondary to acute on chronic diastolic heart failure              - cont diuresis  Hyperglycemia - IDDM + steroids              - short/long-acting insulin  Pulmonary HTN w RVSP of 60    Obstructive sleep apnea - qhs CPAP once out of restraints  Acute on CKD - monitor, avoid nephrotoxins  Hypernatremia - free water  Systemic arterial HTN   Prophylaxis, enteral nutrition, therapies    Ok to transfer patient out of ICU today. [facility] will continue to follow for now.    Discussed patient condition and risk of morbidity and/or mortality with RN, RT, Pharmacy, [facility] resident, Charge nurse / hot rounds and Patient.        Rayne CLAY (Alla), am scribing for, and in the presence of, Jeremy Gonda M.D.   Electronically signed by: Rayne Mayorga (Alla), 6/28/2017  I, Jeremy Gonda M.D.  personally performed the services described in this documentation, as scribed by Rayne Mayorga in my presence, and it is both accurate and complete.

## 2017-06-28 NOTE — CARE PLAN
Problem: Communication  Goal: The ability to communicate needs accurately and effectively will improve  Outcome: PROGRESSING SLOWER THAN EXPECTED  Patient encouraged to use call light for assistance. Patient encouraged to talk clearly. TV turned down when conversing. Education given about plan of care. Speech therapy saw patient this morning.     Problem: Skin Integrity  Goal: Risk for impaired skin integrity will decrease  Outcome: PROGRESSING AS EXPECTED  Turn every two hours. Barrier paste applied, sykes removed. mare checked for redness. Skin in tact around respiratory device.

## 2017-06-28 NOTE — THERAPY
"Physical Therapy Evaluation completed.   Bed Mobility:  Supine to Sit: Maximal Assist  Transfers: Sit to Stand: Refused  Gait: Level Of Assist: Unable to Participate with No Equipment Needed       Plan of Care: Will benefit from Physical Therapy 3 times per week  Discharge Recommendations: Equipment: Will Continue to Assess for Equipment Needs. Post-acute therapy Discharge to a transitional care facility for continued skilled therapy services.  Patient had ORIF right ankle 5/7/2017, TTWB right LE per previous chart    See \"Rehab Therapy-Acute\" Patient Summary Report for complete documentation.     "

## 2017-06-28 NOTE — THERAPY
"Occupational Therapy Evaluation completed.   Functional Status:  Pt presenting to skilled OT services following significant decline with ADLs due to generalized weakness, impaired balance, decreased activity tolerance, and increased O2 needs. Performed bed mobility with max a, tolerated seated eob balance activities and BUE ROM ~20mins with F balance, declined standing c/o fatigue. Pt would benefit from acute and post acute skilled services prior to d/c home.   Plan of Care: Will benefit from Occupational Therapy 3 times per week  Discharge Recommendations:  Equipment: Will Continue to Assess for Equipment Needs. Post-acute therapy Discharge to a transitional care facility for continued skilled therapy services.    See \"Rehab Therapy-Acute\" Patient Summary Report for complete documentation.    "

## 2017-06-28 NOTE — THERAPY
"Speech Language Therapy Clinical Swallow Evaluation completed.  Functional Status: Clinical swallow evaluation completed on this date.  Patient on 5L oxymask and presented with hoarse and whispered speech.  She had delayed responses to verbal stimuli and did not always respond to yes/no questions.  Pt followed directives to complete the oral [facility] exam which revealed mild diffuse weakness and reduced lingual ROM.  Presentation of PO included ice chips, nectars, purees, and thin liquids. The patient presented with delayed A-P transit, delayed initiation of swallow trigger x3-6 seconds, and adequate laryngeal elevation upon palpation.  The patient had wet vocal quality with thin liquids, which cleared with cues to volitional throat clearing, and SpO2 desaturation from 97% to 90% with all PO trials, except ice chips.  She appeared confused at times and following education to results of the CSE, the patient stated, \"ok, but why can't I eat?\"  At this time, the patient does not appear to be at the level for a PO diet.  Recommend she remain NPO with tube feeding.  The patient may benefit from a cognitive-linguistic evaluation, should confusion persist.  SLP following.    Recommendations - Diet: Diet / Liquid Recommendation: NPO, Pre-Feeding Trials with SLP Only                          Strategies: To be assessed                          Medication Administration: Medication Administration : Via Gastric Tube  Plan of Care: Will benefit from Speech Therapy 3 times per week  Post-Acute Therapy: Discharge to a transitional care facility for continued skilled therapy services.    See \"Rehab Therapy-Acute\" Patient Summary Report for complete documentation.   "

## 2017-06-28 NOTE — DISCHARGE PLANNING
Met bedside with pt. Pt informs this Sw that her son and POA on her AD is . Pt states she would like her brother and alternate POA, Uzair to act as POA if needed.

## 2017-06-28 NOTE — PROGRESS NOTES
[facility] Progress Note      Admit Date: 6/21/2017  ICU Day: 7    Resident(s): Carito Richards  Attending: HORACE RYAN/ Dr. Gonda    Date & Time:   6/28/2017   6:24 AM       Patient ID:    Name:             Priya Callahan   YOB: 1950  Age:                 66 y.o.  female   MRN:               [id number]    HPI:  65 y/o female admitted to ICU after intubation for AHRF 2/2 AECOPD.     Consultants:  PMA: Dr. Hope/Gonda    Interval Events:  06/22/2017: Remains on vent, starting tube feeds.  Bronch'd today with tan secretions.  Cultures NGTD.  Continue current ABX.  Hyperkalemia resolved.  Renal function stable. Start lantus 15u for elevated sugars.      06/23/17 : No significant overnight events. She is on Vent support /20/8/35% FiO2 Her ABG today 7.35/44/79/24.3 getting better. On Propofol , Pressors and TF @45.  Recieving Abx Zosyn. Her HBG was 6.7 in AM , 1U PRBC given. Will recheck at 4.30 PM. SBT trials, Mobilization and plans of Extubation if she tolerates SBT.    06/24/17: Agitated overnight, biting on tube, bite guard placed, O2 improved.  Good UOP, CVP: 17.  Repleted electrolytes.  Cultures negative, de-escalate ABX, stop vanco/zosyn, start unasyn.  DC PICC line.  Repeat procalcitonin.  Reduce steroid frequency from Q6H to Q8H.    Sugars uncontrolled, Lantus 25u QAM.  Tolerated 2 hours SBT yesterday, no SBT yet today.  Possibly extubate today.      6/25 - agitated overnight requiring mutilple doses of ativan.  Appeared very uncomfortable on decreased doses of sedation today with increasing RR and decreasing sats.  Propofol gtt resumed. Cultures still ngtd.    6/26 - home meds resumed overnight and BP and agitation slightly better; will attempt SBTs today; start free water flushes for mild hypernatremia; continue to wean steriods    6/27 - tolerated SBT x 1 hr yesterday, will attempt to extubate today; no ativan needed ovenright; continue to wean off precidex and propofol  as able; will complete unasyn today; BP stable on home meds; sugars high on SSI, lantus increased today; continue solumedrol today and transition to prednisone tomorrow; will increase lasix today    6/28 - extubated yesterday, now on 5L oxymask, wean as able; stable overnight; BP on higher side overnight but improved this AM after resuming coreg last night; bradycardia resolved off precidex and propofol drip; abx to end today, will wean to po steroids today; continue cortrak until SLP clearance, increase free water; remove sykes and replace CVC with PIV; continue diuretics and RT therapies; PT eval today; continue to monitor in unit today    Review of Systems   Constitutional: Negative for fever and chills.   HENT: Positive for sore throat.    Respiratory: Positive for shortness of breath. Negative for cough, sputum production and wheezing.    Cardiovascular: Positive for leg swelling. Negative for chest pain and palpitations.   Gastrointestinal: Negative for nausea, vomiting, abdominal pain and constipation.   Genitourinary: Negative for dysuria, urgency and frequency.   Musculoskeletal:        + low back and L ankle pain   Neurological: Negative for focal weakness, loss of consciousness and headaches.   Endo/Heme/Allergies: Bruises/bleeds easily.       PHYSICAL EXAM  Gen: NAD  HEENT: NC/AT, no scleral icterus, no conjunctival injection, mucous membranes pink and moist.  Neck: Supple, no lymphadenopathy.  Cardiac: S1S2, RRR, no m/r/g, no JVD.  Respiratory: Normal effort, symmetrical, symmetrical breath sounds; no rales/ronchi  Abdomen: BS+, soft, NT/ND, no rebound/guarding, no palpable organomegaly.  Ext: 2+ b/l pitting pedal edema, 2+ DP pulses b/l.  Skin: Warm, dry. No rashes or erythema.   Neuro: alert and oriented; no focal deficits    Respiratory:  [place] Vent Mode: APVCMV  Respiration: 18, Pulse Oximetry: 96 %, O2 Daily Delivery Respiratory : OxyMask    Chest Tube Drains:    Recent Labs      06/26/17   0431   17   0422   ISTATAPH  7.371*  7.357*   ISTATAPCO2  34.9  38.9*   ISTATAPO2  60*  63*   ISTATATCO2  21  23   [id number]  90*  91*   ISTATARTHCO3  20.2  21.8   ISTATARTBE  -5*  -3   ISTATTEMP  98.6 F  96.6 F   ISTATFIO2  40  35   ISTATSPEC  Arterial  Arterial   ISTATAPHTC  7.371*  7.373*   [id number]  60*  59*       HemoDynamics:  Pulse: 75, Heart Rate (Monitored): 73 NIBP: 112/51 mmHg CVP (mm Hg): 4 MM HG    Neuro:      Fluids:        Intake/Output Summary (Last 24 hours) at 17 0700  Last data filed at 17 0600   Gross per 24 hour   Intake 2948.63 ml   Output   2680 ml   Net 268.63 ml          Body mass index is 39.25 kg/(m^2).    Recent Labs      1730  17   0541   SODIUM  147*  145   POTASSIUM  4.1  4.1   CHLORIDE  116*  114*   CO2  22  21   BUN  64*  69*   CREATININE  1.11  1.13   CALCIUM  9.1  8.9       GI/Nutrition:  Recent Labs      1730  17   0541   ALTSGPT  15   --    ASTSGOT  9*   --    ALKPHOSPHAT  44   --    TBILIRUBIN  0.8   --    PREALBUMIN  21.0   --    GLUCOSE  274*  338*       Heme:  Recent Labs      1730  17   0541   RBC  2.77*  2.68*   HEMOGLOBIN  7.8*  7.8*   HEMATOCRIT  26.3*  26.0*   PLATELETCT  198  172       Infectious Disease:  Monitored Temp  Av.4 °C (97.6 °F)  Min: 35.7 °C (96.3 °F)  Max: 36.8 °C (98.2 °F)  Recent Labs      1730  17   0541   WBC  6.5  8.1   NEUTSPOLYS  89.80*  90.30*   LYMPHOCYTES  4.20*  2.60*   MONOCYTES  2.20  2.50   EOSINOPHILS  0.00  0.00   BASOPHILS  0.20  0.10   ASTSGOT  9*   --    ALTSGPT  15   --    ALKPHOSPHAT  44   --    TBILIRUBIN  0.8   --        Meds:  • insulin glargine  30 Units     • furosemide  40 mg     • carvedilol  3.125 mg     • methylPREDNISolone  62.5 mg     • lorazepam  1 mg     • gabapentin  600 mg     • amlodipine  10 mg     • duloxetine  60 mg     • oxycodone immediate release  10 mg     • trazodone  300 mg     • famotidine  20 mg     • ampicillin-sulbactam  (UNASYN) IV  3 g 3 g (06/28/17 0603)   • dexmedetomidine (PRECEDEX) infusion 4 mcg/ml  0.1-1.5 mcg/kg/hr 0.4 mcg/kg/hr (06/27/17 1500)   • hydrALAZINE  10 mg     • hydrOXYzine  50 mg     • Respiratory Care per Protocol       • senna-docusate  2 Tab      And   • polyethylene glycol/lytes  1 Packet      And   • magnesium hydroxide  30 mL      And   • bisacodyl  10 mg     • chlorhexidine  15 mL     • lidocaine  1-2 mL     • MD ALERT...Adult ICU Electrolyte Replacement per Pharmacy Protocol       • fentaNYL  25 mcg      Or   • fentaNYL  50 mcg      Or   • fentaNYL  100 mcg     • ipratropium-albuterol  3 mL     • insulin lispro  2-9 Units     • glucose 4 g  16 g      And   • dextrose 50%  25 mL     • propofol  5-80 mcg/kg/min Stopped (06/27/17 1228)   • heparin  5,000 Units     • levothyroxine  75 mcg     • montelukast  10 mg     • acetaminophen  650 mg     • ipratropium-albuterol  3 mL          Procedures:  6/22 - intuabation, bronchoscopy, and R IJV CVC placement    6/27 - extubation    Imaging:  DX-CHEST-PORTABLE (1 VIEW)   Final Result         1. Interval extubation. No significant interval change.      DX-CHEST-PORTABLE (1 VIEW)   Final Result         1.  Pulmonary edema and/or infiltrates are identified, which are stable since the prior exam.   2.  Cardiomegaly      DX-CHEST-PORTABLE (1 VIEW)   Final Result         1.  Pulmonary edema and/or infiltrates are identified, which appear somewhat increased since the prior exam.   2.  Cardiomegaly      DX-ABDOMEN FOR TUBE PLACEMENT   Final Result      Enteric tube has been placed and the tip projects over the stomach.      DX-CHEST-PORTABLE (1 VIEW)   Final Result      Findings consistent with pulmonary edema, with no significant change.      DX-CHEST-PORTABLE (1 VIEW)   Final Result      Stable chest appearance compared with 6/23.      DX-CHEST-PORTABLE (1 VIEW)   Final Result      Stable chest appearance compared with 6/22.      DX-ABDOMEN FOR TUBE PLACEMENT   Final  Result      Feeding tube and nasogastric tube in place as noted above.      DX-CHEST-PORTABLE (1 VIEW)   Final Result      Right central venous line in place, with no pneumothorax. Improving pulmonary edema.      DX-CHEST-PORTABLE (1 VIEW)   Final Result      Mid and lower zone opacities, most consistent with pulmonary edema, overall worse compared with 6/13/2017.          Problem and Plan:    ACUTE HYPOXIC RESPIRATORY FAILURE  CHRONIC RESPIRATORY FAILURE  AECOPD  - CXR showed worsened bilateral LL infiltrates on admission; echo normal in 3/2017; LE US neg for DVT in 6/2017; CTPE neg in 6/2017; echo in 2015 shows normal EF with G2 diastolic dysfunction and pulmonary htn; likely 2/2 COPD exacerbation 2/2 HCAP; intubated 6/21-27, now on 5L oxymask; RT protocol; steroids tapered to prednisone today; abx to complete today; CXR stable, continue diuresis; prn hydroxyzine/ativan for anxiety    SEPSIS 2/2 RESPIRATORY SOURCE  HCAP  LEUKOCYTOSIS  - multiple recent hospital admissions from nursing home; cultures from prior admission all neg; UA neg; CXR shows worsened infiltrates on admission; bl/urine/BAL cultures NGTD this admission; procalcitonin trending down; WBC normalized; continue unasyn to complete 7 day abx course; continue TF at goal until cleared for diet by SLP    ACUTE ON CHRONIC NORMOCYTIC ANEMIA - s/p 1 U pRBC on 6/23; h/h stable; no e/o bleeding    HYPERKALEMIA - resolved    HYPERNATREMIA - mild; continue free water flushes until tolerating diet    CKD-3 - stable at baseline; avoid nephrotoxins    DM2 WITH NEUROPATHY - a1c 5.2; Lantus and SSI with Accuchecks; hypoglycemia protocol; continue home gabapentin    HTN -  continue home amlodipine and coreg; hydralazine prn; BP stable    CHRONIC HEP C - stable    FIBROMYALGIA - continue home trazodone and cymbalta and gabpentin    ASTHMA - continue monteleukast    HYPOTHYROIDISM - continue home synthroid    RECENT R ANKLE FRACTURE - s/p ORIF; use brace for comfort;  pain control; PT/OT    DISPO: ICU; possible transfer to floor this PM    CODE STATUS: FULL    Quality Measures:  Mckeon Catheter: remove todayyess: heparin  Ulcer Prophylaxis: no  Antibiotics: unasyn  Lines: remove central today; PIV

## 2017-06-28 NOTE — CARE PLAN
Problem: Oxygenation:  Goal: Maintain adequate oxygenation dependent on patient condition  Outcome: PROGRESSING AS EXPECTED      #SVN Performed: Yes (06/27/17 1931)     O2 (LPM): 5 (06/28/17 0200)  O2 Daily Delivery Respiratory : OxyMask (06/27/17 1931)  Patient toleration Y  Events/Summary/Plan: SVN (06/27/17 1525)

## 2017-06-28 NOTE — CARE PLAN
Problem: Communication  Goal: The ability to communicate needs accurately and effectively will improve  Intervention: [place] patient and significant other/support system to call light to alert staff of needs  Educated the pt on how to use the call light and to not attempt to get out of bed without the assistance of a staff member.   Intervention: Educate patient and significant other/support system about the plan of care, procedures, treatments, medications and allow for questions  Educated the pt on the POC and answered all questions.

## 2017-06-29 LAB
ANION GAP SERPL CALC-SCNC: 7 MMOL/L (ref 0–11.9)
BUN SERPL-MCNC: 77 MG/DL (ref 8–22)
CALCIUM SERPL-MCNC: 9.1 MG/DL (ref 8.5–10.5)
CHLORIDE SERPL-SCNC: 109 MMOL/L (ref 96–112)
CO2 SERPL-SCNC: 30 MMOL/L (ref 20–33)
CREAT SERPL-MCNC: 1.1 MG/DL (ref 0.5–1.4)
GFR SERPL CREATININE-BSD FRML MDRD: 49 ML/MIN/1.73 M 2
GLUCOSE BLD-MCNC: 127 MG/DL (ref 65–99)
GLUCOSE BLD-MCNC: 157 MG/DL (ref 65–99)
GLUCOSE BLD-MCNC: 177 MG/DL (ref 65–99)
GLUCOSE BLD-MCNC: 196 MG/DL (ref 65–99)
GLUCOSE SERPL-MCNC: 135 MG/DL (ref 65–99)
POTASSIUM SERPL-SCNC: 3.5 MMOL/L (ref 3.6–5.5)
SODIUM SERPL-SCNC: 146 MMOL/L (ref 135–145)

## 2017-06-29 PROCEDURE — 302255 BARRIER CREAM MOISTURE BAZA PROTECT (ZINC) 5OZ: Performed by: INTERNAL MEDICINE

## 2017-06-29 PROCEDURE — 700111 HCHG RX REV CODE 636 W/ 250 OVERRIDE (IP): Performed by: INTERNAL MEDICINE

## 2017-06-29 PROCEDURE — A9270 NON-COVERED ITEM OR SERVICE: HCPCS | Performed by: INTERNAL MEDICINE

## 2017-06-29 PROCEDURE — 94640 AIRWAY INHALATION TREATMENT: CPT

## 2017-06-29 PROCEDURE — 700102 HCHG RX REV CODE 250 W/ 637 OVERRIDE(OP): Performed by: INTERNAL MEDICINE

## 2017-06-29 PROCEDURE — 770001 HCHG ROOM/CARE - MED/SURG/GYN PRIV*

## 2017-06-29 PROCEDURE — 700101 HCHG RX REV CODE 250: Performed by: INTERNAL MEDICINE

## 2017-06-29 PROCEDURE — 94760 N-INVAS EAR/PLS OXIMETRY 1: CPT

## 2017-06-29 PROCEDURE — 82962 GLUCOSE BLOOD TEST: CPT | Mod: 91

## 2017-06-29 PROCEDURE — 80048 BASIC METABOLIC PNL TOTAL CA: CPT

## 2017-06-29 RX ORDER — CHLOROTHIAZIDE SODIUM 500 MG/1
500 INJECTION INTRAVENOUS ONCE
Status: COMPLETED | OUTPATIENT
Start: 2017-06-29 | End: 2017-06-29

## 2017-06-29 RX ORDER — POTASSIUM CHLORIDE 1.5 G/1.58G
40 POWDER, FOR SOLUTION ORAL ONCE
Status: COMPLETED | OUTPATIENT
Start: 2017-06-29 | End: 2017-06-29

## 2017-06-29 RX ORDER — LISINOPRIL 5 MG/1
5 TABLET ORAL
Status: DISCONTINUED | OUTPATIENT
Start: 2017-06-29 | End: 2017-06-29

## 2017-06-29 RX ADMIN — CARVEDILOL 3.12 MG: 6.25 TABLET, FILM COATED ORAL at 09:44

## 2017-06-29 RX ADMIN — TRAZODONE HYDROCHLORIDE 300 MG: 150 TABLET ORAL at 20:21

## 2017-06-29 RX ADMIN — HYDRALAZINE HYDROCHLORIDE 10 MG: 20 INJECTION INTRAMUSCULAR; INTRAVENOUS at 05:10

## 2017-06-29 RX ADMIN — IPRATROPIUM BROMIDE AND ALBUTEROL SULFATE 3 ML: .5; 3 SOLUTION RESPIRATORY (INHALATION) at 11:41

## 2017-06-29 RX ADMIN — CARVEDILOL 3.12 MG: 6.25 TABLET, FILM COATED ORAL at 18:19

## 2017-06-29 RX ADMIN — GABAPENTIN 600 MG: 300 CAPSULE ORAL at 20:19

## 2017-06-29 RX ADMIN — IPRATROPIUM BROMIDE AND ALBUTEROL SULFATE 3 ML: .5; 3 SOLUTION RESPIRATORY (INHALATION) at 07:27

## 2017-06-29 RX ADMIN — FUROSEMIDE 40 MG: 10 INJECTION, SOLUTION INTRAMUSCULAR; INTRAVENOUS at 15:21

## 2017-06-29 RX ADMIN — OXYCODONE HYDROCHLORIDE 10 MG: 10 TABLET ORAL at 05:01

## 2017-06-29 RX ADMIN — INSULIN GLARGINE 30 UNITS: 100 INJECTION, SOLUTION SUBCUTANEOUS at 05:20

## 2017-06-29 RX ADMIN — FUROSEMIDE 40 MG: 10 INJECTION, SOLUTION INTRAMUSCULAR; INTRAVENOUS at 09:44

## 2017-06-29 RX ADMIN — GABAPENTIN 600 MG: 300 CAPSULE ORAL at 15:21

## 2017-06-29 RX ADMIN — INSULIN LISPRO 2 UNITS: 100 INJECTION, SOLUTION INTRAVENOUS; SUBCUTANEOUS at 23:31

## 2017-06-29 RX ADMIN — HEPARIN SODIUM 5000 UNITS: 5000 INJECTION, SOLUTION INTRAVENOUS; SUBCUTANEOUS at 15:20

## 2017-06-29 RX ADMIN — LEVOTHYROXINE SODIUM 75 MCG: 75 TABLET ORAL at 05:09

## 2017-06-29 RX ADMIN — INSULIN LISPRO 2 UNITS: 100 INJECTION, SOLUTION INTRAVENOUS; SUBCUTANEOUS at 18:20

## 2017-06-29 RX ADMIN — IPRATROPIUM BROMIDE AND ALBUTEROL SULFATE 3 ML: .5; 3 SOLUTION RESPIRATORY (INHALATION) at 20:00

## 2017-06-29 RX ADMIN — GABAPENTIN 600 MG: 300 CAPSULE ORAL at 09:46

## 2017-06-29 RX ADMIN — PREDNISONE 40 MG: 20 TABLET ORAL at 09:45

## 2017-06-29 RX ADMIN — OXYCODONE HYDROCHLORIDE 10 MG: 10 TABLET ORAL at 20:59

## 2017-06-29 RX ADMIN — CHLOROTHIAZIDE SODIUM 500 MG: 500 INJECTION, POWDER, LYOPHILIZED, FOR SOLUTION INTRAVENOUS at 12:58

## 2017-06-29 RX ADMIN — INSULIN LISPRO 2 UNITS: 100 INJECTION, SOLUTION INTRAVENOUS; SUBCUTANEOUS at 00:19

## 2017-06-29 RX ADMIN — OXYCODONE HYDROCHLORIDE 10 MG: 10 TABLET ORAL at 13:29

## 2017-06-29 RX ADMIN — INSULIN LISPRO 2 UNITS: 100 INJECTION, SOLUTION INTRAVENOUS; SUBCUTANEOUS at 13:03

## 2017-06-29 RX ADMIN — HEPARIN SODIUM 5000 UNITS: 5000 INJECTION, SOLUTION INTRAVENOUS; SUBCUTANEOUS at 20:19

## 2017-06-29 RX ADMIN — AMLODIPINE BESYLATE 10 MG: 5 TABLET ORAL at 09:46

## 2017-06-29 RX ADMIN — MONTELUKAST SODIUM 10 MG: 10 TABLET, FILM COATED ORAL at 09:45

## 2017-06-29 RX ADMIN — IPRATROPIUM BROMIDE AND ALBUTEROL SULFATE 3 ML: .5; 3 SOLUTION RESPIRATORY (INHALATION) at 14:53

## 2017-06-29 RX ADMIN — POTASSIUM CHLORIDE 40 MEQ: 1.5 POWDER, FOR SOLUTION ORAL at 09:46

## 2017-06-29 RX ADMIN — DULOXETINE HYDROCHLORIDE 60 MG: 60 CAPSULE, DELAYED RELEASE ORAL at 09:46

## 2017-06-29 RX ADMIN — FUROSEMIDE 40 MG: 10 INJECTION, SOLUTION INTRAMUSCULAR; INTRAVENOUS at 20:19

## 2017-06-29 RX ADMIN — MORPHINE SULFATE 2 MG: 4 INJECTION INTRAVENOUS at 01:16

## 2017-06-29 RX ADMIN — HEPARIN SODIUM 5000 UNITS: 5000 INJECTION, SOLUTION INTRAVENOUS; SUBCUTANEOUS at 05:01

## 2017-06-29 ASSESSMENT — ENCOUNTER SYMPTOMS
SORE THROAT: 1
CHILLS: 0
VOMITING: 0
LOSS OF CONSCIOUSNESS: 0
COUGH: 0
SPUTUM PRODUCTION: 0
SHORTNESS OF BREATH: 0
BRUISES/BLEEDS EASILY: 1
NAUSEA: 0
HEADACHES: 0
WHEEZING: 0
FEVER: 0
FOCAL WEAKNESS: 0
CONSTIPATION: 0
PALPITATIONS: 0
ABDOMINAL PAIN: 0

## 2017-06-29 ASSESSMENT — PAIN SCALES - GENERAL
PAINLEVEL_OUTOF10: 2
PAINLEVEL_OUTOF10: 7
PAINLEVEL_OUTOF10: 5
PAINLEVEL_OUTOF10: 7
PAINLEVEL_OUTOF10: 0
PAINLEVEL_OUTOF10: 8
PAINLEVEL_OUTOF10: 5
PAINLEVEL_OUTOF10: 5
PAINLEVEL_OUTOF10: 0

## 2017-06-29 NOTE — PROGRESS NOTES
[facility] ICU Progress Note      Admit Date: 6/21/2017  ICU Day: 8    Resident(s): Carito Richards  Attending: HORACE RYAN/ Dr. Gonda    Date & Time:   6/29/2017   7:13 AM       Patient ID:    Name:             Priya Callahan   YOB: 1950  Age:                 66 y.o.  female   MRN:               [id number]    HPI:  67 y/o female admitted to ICU after intubation for AHRF 2/2 AECOPD.     Consultants:  PMA: Dr. Hope/Gonda    Interval Events:  06/22/2017: Remains on vent, starting tube feeds.  Bronch'd today with tan secretions.  Cultures NGTD.  Continue current ABX.  Hyperkalemia resolved.  Renal function stable. Start lantus 15u for elevated sugars.      06/23/17 : No significant overnight events. She is on Vent support /20/8/35% FiO2 Her ABG today 7.35/44/79/24.3 getting better. On Propofol , Pressors and TF @45.  Recieving Abx Zosyn. Her HBG was 6.7 in AM , 1U PRBC given. Will recheck at 4.30 PM. SBT trials, Mobilization and plans of Extubation if she tolerates SBT.    06/24/17: Agitated overnight, biting on tube, bite guard placed, O2 improved.  Good UOP, CVP: 17.  Repleted electrolytes.  Cultures negative, de-escalate ABX, stop vanco/zosyn, start unasyn.  DC PICC line.  Repeat procalcitonin.  Reduce steroid frequency from Q6H to Q8H.    Sugars uncontrolled, Lantus 25u QAM.  Tolerated 2 hours SBT yesterday, no SBT yet today.  Possibly extubate today.      6/25 - agitated overnight requiring mutilple doses of ativan.  Appeared very uncomfortable on decreased doses of sedation today with increasing RR and decreasing sats.  Propofol gtt resumed. Cultures still ngtd.    6/26 - home meds resumed overnight and BP and agitation slightly better; will attempt SBTs today; start free water flushes for mild hypernatremia; continue to wean steriods    6/27 - tolerated SBT x 1 hr yesterday, will attempt to extubate today; no ativan needed ovenright; continue to wean off precidex and propofol  as able; will complete unasyn today; BP stable on home meds; sugars high on SSI, lantus increased today; continue solumedrol today and transition to prednisone tomorrow; will increase lasix today    6/28 - extubated yesterday, now on 5L oxymask, wean as able; stable overnight; BP on higher side overnight but improved this AM after resuming coreg last night; bradycardia resolved off precidex and propofol drip; abx to end today, will wean to po steroids today; continue cortrak until SLP clearance, increase free water; remove sykes and replace CVC with PIV; continue diuretics and RT therapies; PT eval today; continue to monitor in unit today    6/29 - PT/OT/SLP working with patient; still NPO with TF per speech; required prn hydralazine overnight; no ativan needed overnight; doing well on 5L O2, ABG normalized, will contiue RT and wean down O2 as able; continue diuresis with lasix, added diuril today; stable for transfer to floor    Review of Systems   Constitutional: Negative for fever and chills.   HENT: Positive for sore throat.    Respiratory: Negative for cough, sputum production, shortness of breath and wheezing.    Cardiovascular: Negative for chest pain and palpitations.   Gastrointestinal: Negative for nausea, vomiting, abdominal pain and constipation.   Genitourinary: Negative for dysuria, urgency and frequency.   Musculoskeletal:        + low back and L ankle pain   Neurological: Negative for focal weakness, loss of consciousness and headaches.   Endo/Heme/Allergies: Bruises/bleeds easily.       PHYSICAL EXAM  Gen: NAD  HEENT: NC/AT, no scleral icterus, no conjunctival injection, mucous membranes pink and moist.  Neck: Supple, no lymphadenopathy.  Cardiac: S1S2, RRR, no m/r/g, no JVD.  Respiratory: Normal effort, symmetrical, symmetrical breath sounds; no rales/ronchi  Abdomen: BS+, soft, NT/ND, no rebound/guarding, no palpable organomegaly.  Ext: 2+ b/l pitting pedal edema, 2+ DP pulses b/l.  Skin: Warm,  dry. No rashes or erythema. Multiple bruises over extermities  Neuro: alert and oriented; no focal deficits    Respiratory:     Respiration: (!) 23, Pulse Oximetry: 99 %, O2 Daily Delivery Respiratory : OxyMask    Chest Tube Drains:    Recent Labs      17   0422  17   1052   ISTATAPH  7.357*  7.397*   ISTATAPCO2  38.9*  38.9*   ISTATAPO2  63*  78   ISTATATCO2  23  25   [id number]  91*  95   ISTATARTHCO3  21.8  23.9   ISTATARTBE  -3  -1   ISTATTEMP  96.6 F  96.2 F   ISTATFIO2  35  40   ISTATSPEC  Arterial  Arterial   ISTATAPHTC  7.373*  7.416   [id number]  59*  71       HemoDynamics:  Pulse: 63, Heart Rate (Monitored): 62 NIBP: 128/60 mmHg CVP (mm Hg): (!) 9 MM HG    Neuro:      Fluids:        Intake/Output Summary (Last 24 hours) at 17 0700  Last data filed at 17 0600   Gross per 24 hour   Intake 2948.63 ml   Output   2680 ml   Net 268.63 ml       Weight: 101.2 kg (223 lb 1.7 oz)  Body mass index is 37.72 kg/(m^2).    Recent Labs      17   0541  1715  17   0437   SODIUM  145  148*  146*   POTASSIUM  4.1  4.0  3.5*   CHLORIDE  114*  114*  109   CO2  21  25  30   BUN  69*  71*  77*   CREATININE  1.13  1.08  1.10   CALCIUM  8.9  9.2  9.1       GI/Nutrition:  Recent Labs      17   0541  17   0615  17   0437   GLUCOSE  338*  249*  135*       Heme:  Recent Labs      17   0541  17   0615   RBC  2.68*  2.97*   HEMOGLOBIN  7.8*  8.5*   HEMATOCRIT  26.0*  28.3*   PLATELETCT  172  286       Infectious Disease:  Temp  Av.6 °C (97.8 °F)  Min: 36.3 °C (97.4 °F)  Max: 36.7 °C (98.1 °F)  Monitored Temp  Av.7 °C (96.3 °F)  Min: 34.8 °C (94.6 °F)  Max: 36.3 °C (97.3 °F)  Recent Labs      17   0541  17   0615   WBC  8.1  19.5*   NEUTSPOLYS  90.30*  89.10*   LYMPHOCYTES  2.60*  2.40*   MONOCYTES  2.50  4.30   EOSINOPHILS  0.00  0.00   BASOPHILS  0.10  0.20       Meds:  • predniSONE  40 mg     • lorazepam  1 mg     • oxycodone  immediate-release  5 mg      Or   • oxycodone immediate-release  10 mg     • morphine injection  2 mg     • furosemide  40 mg     • insulin glargine  30 Units     • carvedilol  3.125 mg     • gabapentin  600 mg     • amlodipine  10 mg     • duloxetine  60 mg     • trazodone  300 mg     • hydrALAZINE  10 mg     • hydrOXYzine  50 mg     • Respiratory Care per Protocol       • senna-docusate  2 Tab      And   • polyethylene glycol/lytes  1 Packet      And   • magnesium hydroxide  30 mL      And   • bisacodyl  10 mg     • ipratropium-albuterol  3 mL     • insulin lispro  2-9 Units     • glucose 4 g  16 g      And   • dextrose 50%  25 mL     • heparin  5,000 Units     • levothyroxine  75 mcg     • montelukast  10 mg     • acetaminophen  650 mg     • ipratropium-albuterol  3 mL          Procedures:  6/22 - intuabation, bronchoscopy, and R IJV CVC placement    6/27 - extubation    Imaging:  DX-CHEST-PORTABLE (1 VIEW)   Final Result         1. Interval extubation. No significant interval change.      DX-CHEST-PORTABLE (1 VIEW)   Final Result         1.  Pulmonary edema and/or infiltrates are identified, which are stable since the prior exam.   2.  Cardiomegaly      DX-CHEST-PORTABLE (1 VIEW)   Final Result         1.  Pulmonary edema and/or infiltrates are identified, which appear somewhat increased since the prior exam.   2.  Cardiomegaly      DX-ABDOMEN FOR TUBE PLACEMENT   Final Result      Enteric tube has been placed and the tip projects over the stomach.      DX-CHEST-PORTABLE (1 VIEW)   Final Result      Findings consistent with pulmonary edema, with no significant change.      DX-CHEST-PORTABLE (1 VIEW)   Final Result      Stable chest appearance compared with 6/23.      DX-CHEST-PORTABLE (1 VIEW)   Final Result      Stable chest appearance compared with 6/22.      DX-ABDOMEN FOR TUBE PLACEMENT   Final Result      Feeding tube and nasogastric tube in place as noted above.      DX-CHEST-PORTABLE (1 VIEW)   Final  Result      Right central venous line in place, with no pneumothorax. Improving pulmonary edema.      DX-CHEST-PORTABLE (1 VIEW)   Final Result      Mid and lower zone opacities, most consistent with pulmonary edema, overall worse compared with 6/13/2017.          Problem and Plan:    ACUTE HYPOXIC RESPIRATORY FAILURE  CHRONIC RESPIRATORY FAILURE  AECOPD  - CXR showed worsened bilateral LL infiltrates on admission; echo normal in 3/2017; LE US neg for DVT in 6/2017; CTPE neg in 6/2017; echo in 2015 shows normal EF with G2 diastolic dysfunction and pulmonary htn; likely 2/2 COPD exacerbation 2/2 HCAP; intubated 6/21-27, now on 5L oxymask; RT protocol, wean as able; continue prednisone; abx completed; CXR stable, continue diuresis; prn hydroxyzine/ativan for anxiety    SEPSIS 2/2 RESPIRATORY SOURCE  HCAP  LEUKOCYTOSIS  - multiple recent hospital admissions from nursing home; cultures from prior admission all neg; UA neg; CXR shows worsened infiltrates on admission; bl/urine/BAL cultures NGTD this admission; procalcitonin trending down; WBC normalized; completed 7 day course of unasyn; continue TF at goal until cleared for diet by SLP    ACUTE ON CHRONIC NORMOCYTIC ANEMIA - s/p 1 U pRBC on 6/23; h/h stable; no e/o bleeding    HYPERKALEMIA - resolved    HYPERNATREMIA - mild; continue free water flushes until tolerating diet    CKD-3 - stable at baseline; avoid nephrotoxins    DM2 WITH NEUROPATHY - a1c 5.2; Lantus and SSI with Accuchecks; hypoglycemia protocol; continue home gabapentin; started ACEI today    HTN -  continue home amlodipine and coreg; hydralazine prn; add lisinopril today    CHRONIC HEP C - stable    FIBROMYALGIA - continue home trazodone and cymbalta and gabpentin    ASTHMA - continue monteleukast    HYPOTHYROIDISM - continue home synthroid    RECENT R ANKLE FRACTURE - s/p ORIF; use brace for comfort; pain control; PT/OT on board    DISPO: transfer to floor    CODE STATUS: FULL    Quality  Measures:  Mckeon Catheter: no  DVT Prophylaxix: heparin  Ulcer Prophylaxis: no  Antibiotics: none  Lines: PIV

## 2017-06-29 NOTE — CARE PLAN
Problem: Safety  Goal: Will remain free from injury  Outcome: PROGRESSING AS EXPECTED  Fall precautions in place. Bed alarm on. Call light within reach. Room near nursing station.    Problem: Knowledge Deficit  Goal: Knowledge of disease process/condition, treatment plan, diagnostic tests, and medications will improve  Outcome: PROGRESSING AS EXPECTED  Discussed poc with pt and encouraged use of IS and deep breathe and cough.

## 2017-06-29 NOTE — CARE PLAN
Problem: Respiratory:  Goal: Respiratory status will improve  Outcome: PROGRESSING AS EXPECTED  Patient is on 6 L mask. Will titrate as appropriate.     Problem: Urinary Elimination:  Goal: Ability to reestablish a normal urinary elimination pattern will improve  Outcome: PROGRESSING SLOWER THAN EXPECTED  Patient is incontinent of urine frequently due to diuretic. Barrier cream applied each time.

## 2017-06-29 NOTE — PROGRESS NOTES
Pulmonary Critical Care Progress Note      Date of Service: 6/29/17    Chief Complaint: Shortness of Breath    History of Present Illness: This is a 66-year-old lady who has a history of chronic hypoxemic respiratory failure and chronic obstructive pulmonary disease.  She was hospitalized at [facility] from on or about May 7th to 05/10/2017, with an ankle fracture.  She was then readmitted to [facility] from on or about June 2nd to 06/09/2017, with ventilator dependent respiratory failure and healthcare-associated pneumonia.  She was subsequently sent to a skilled nursing facility.  She now comes back to the emergency room this evening because of increasing shortness of breath.  She was brought in here with wheezing and coarse crackles.  She was in respiratory distress.  She was intubated.  She is now on full mechanical ventilatory support.  Once again, she has been more residing at a skilled nursing facility.    ROS:    Respiratory: positive cough, negative shortness of breath and negative sputum production,   Cardiac: negative chest pain and negative palpitations,   GI: negative nausea, negative vomiting and negative abdominal pain.    All other systems negative.      Interval Events:  24 hour interval history reviewed   Extubated two days ago  Failed swallow evaluation yesterday   Alert and oriented x 2-3  SR to SB, down to 50's last night  Hypertensive last night, given Lisinopril  Mobilizing    PFSH:  No change.      Respiratory:     Pulse Oximetry: 99 % 5L Facemask         Exam: Coarse rhonchi at bases but improving, breathing comfortably, speaking full sentences without accessory muscle use, persistent hoarse voice but somewhat stronger    ImagingCXR  I have personally reviewed the chest x-ray my impression is  no CXR today.    Recent Labs      06/27/17   0422  06/28/17   1052   ISTATAPH  7.357*  7.397*   ISTATAPCO2  38.9*  38.9*   ISTATAPO2  63*  78   ISTATATCO2  23  25   [id number]  91*  95   ISTATARTHCO3  21.8   23.9   ISTATARTBE  -3  -1   ISTATTEMP  96.6 F  96.2 F   ISTATFIO2  35  40   ISTATSPEC  Arterial  Arterial   ISTATAPHTC  7.373*  7.416   [id number]  59*  71   Blood gas shows: none today       HemoDynamics:  Pulse: 63, Heart Rate (Monitored): 62  NIBP: 128/60 mmHg  CVP (mm Hg): (!) 9 MM HG     Lasix increased, still +1.5L UO today    Exam: RRR, no murmur, trace lower extremity edema    Imaging: Available data reviewed        Neuro:  GCS Total [place] Coma Score: 15    Exam: A&O x4, resolved confusion, no focal deficits but generalized weakness     Imaging: Available data reviewed      Fluids:  Intake/Output       06/27/17 0700 - 06/28/17 0659 06/28/17 0700 - 06/29/17 0659 06/29/17 0700 - 06/30/17 0659      0700-1859 9237-0126 Total 0700-1859 1900-0659 Total 0384-6640 0985-7425 Total       Intake    I.V.  591.6  320 911.6  310  120 430  --  -- --    Precedex Volume 150.8 0 150.8 -- -- -- -- -- --    Propofol Volume 130.8 0 130.8 -- -- -- -- -- --    IV Volume (TKO) 110 120 230 110 120 230 -- -- --    IV Piggyback Volume (IV Piggyback) 200 200 400 200 -- 200 -- -- --    Other  255  100 355  145  150 295  --  -- --    Medications (P.O./ Enteral Liquids) 135 100 235 145 150 295 -- -- --    Flush / Irrigation Volume (Rectal tube) 120 -- 120 -- -- -- -- -- --    Enteral  940  800 1740  1200  1220 2420  --  -- --    Enteral Volume   -- -- --    Free Water / Tube Flush 400 260 660  -- -- --    Total Intake 1786.6 1220 3006.6 1655 1490 3145 -- -- --       Output    Urine  2050  1500 3550  1025  640 1665  --  -- --    Number of Times Voided -- -- -- 1 x 1 x 2 x -- -- --    Number of Times Incontinent of Urine -- -- -- 5 x 4 x 9 x -- -- --    Indwelling Cathether 2050 1500 3550 525 -- 525 -- -- --    Void (ml) -- -- --  -- -- --    Stool  --  -- --  --  -- --  --  -- --    Number of Times Stooled 2 x 1 x 3 x 0 x 0 x 0 x -- -- --    Total Output 2050 1500 3550 9224 577 0090 -- --  --       Net I/O     -263.4 -280 -543.4  -- -- --        Weight: 101.2 kg (223 lb 1.7 oz)  Recent Labs      17   0541  17   0615  17   0437   SODIUM  145  148*  146*   POTASSIUM  4.1  4.0  3.5*   CHLORIDE  114*  114*  109   CO2  21  25  30   BUN  69*  71*  77*   CREATININE  1.13  1.08  1.10   CALCIUM  8.9  9.2  9.1       GI/Nutrition:  Exam: Obese, soft, NT, Normal bowel sounds, tolerating TF  Imaging: Available data reviewed  NPO and tube feed Tolerated   TF at 50, goal  BM yesterday  Did not pass swallow exam    Liver Function  Recent Labs      17   0541  17   0615  17   0437   GLUCOSE  338*  249*  135*       Heme:  Recent Labs      17   0541  17   0615   RBC  2.68*  2.97*   HEMOGLOBIN  7.8*  8.5*   HEMATOCRIT  26.0*  28.3*   PLATELETCT  172  286       Infectious Disease:  Temp  Av.6 °C (97.8 °F)  Min: 36.3 °C (97.4 °F)  Max: 36.7 °C (98.1 °F)  Monitored Temp  Av.8 °C (96.5 °F)  Min: 34.8 °C (94.6 °F)  Max: 36.4 °C (97.5 °F)   7 day course Unasyn completed   Cultures negative    Micro: reviewed  Recent Labs      1741  17   0615   WBC  8.1  19.5*   NEUTSPOLYS  90.30*  89.10*   LYMPHOCYTES  2.60*  2.40*   MONOCYTES  2.50  4.30   EOSINOPHILS  0.00  0.00   BASOPHILS  0.10  0.20     Current Facility-Administered Medications   Medication Dose Frequency Provider Last Rate Last Dose   • predniSONE (DELTASONE) tablet 40 mg  40 mg DAILY Carito Richards M.D.   40 mg at 17 0833   • lorazepam (ATIVAN) injection 1 mg  1 mg Q6HRS PRN Carito Richards M.D.       • oxycodone immediate-release (ROXICODONE) tablet 5 mg  5 mg Q4HRS PRN Carito Richards M.D.        Or   • oxycodone immediate release (ROXICODONE) tablet 10 mg  10 mg Q4HRS PRN Carito Richards M.D.   10 mg at 17 0501   • morphine (pf) 4 mg/ml injection 2 mg  2 mg Q4HRS PRN Cairto Richards M.D.   2 mg at 17 0116   • furosemide  (LASIX) injection 40 mg  40 mg TID Jeremy M Gonda, M.D.   40 mg at 06/28/17 2010   • insulin glargine (LANTUS) injection 30 Units  30 Units QAM INSULIN Carito Richards M.D.   30 Units at 06/29/17 0520   • carvedilol (COREG) tablet 3.125 mg  3.125 mg BID WITH MEALS Carito Richards M.D.   3.125 mg at 06/28/17 2010   • gabapentin (NEURONTIN) capsule 600 mg  600 mg TID William Hope M.D.   600 mg at 06/28/17 2010   • amlodipine (NORVASC) tablet 10 mg  10 mg DAILY William Hope M.D.   10 mg at 06/28/17 0834   • duloxetine (CYMBALTA) capsule 60 mg  60 mg DAILY William Hope M.D.   60 mg at 06/28/17 0930   • trazodone (DESYREL) tablet 300 mg  300 mg Nightly William oHpe M.D.   300 mg at 06/28/17 2010   • hydrALAZINE (APRESOLINE) injection 10 mg  10 mg Q4HRS PRN Carito Richards M.D.   10 mg at 06/29/17 0510   • hydrOXYzine (ATARAX) tablet 50 mg  50 mg TID PRN Carito Richards M.D.   50 mg at 06/27/17 2315   • Respiratory Care per Protocol   Continuous RT Angel Lema M.D.       • senna-docusate (PERICOLACE or SENOKOT S) 8.6-50 MG per tablet 2 Tab  2 Tab BID Angel Lema M.D.   Stopped at 06/27/17 0900    And   • polyethylene glycol/lytes (MIRALAX) PACKET 1 Packet  1 Packet QDAY PRN Angel Lema M.D.        And   • magnesium hydroxide (MILK OF MAGNESIA) suspension 30 mL  30 mL QDAY PRN Angel Lema M.D.        And   • bisacodyl (DULCOLAX) suppository 10 mg  10 mg QDAY PRN Angel P Lema, M.D.       • ipratropium-albuterol (DUONEB) nebulizer solution 3 mL  3 mL Q2HRS PRN (RT) Angel Lema M.D.       • insulin lispro (HUMALOG) injection 2-9 Units  2-9 Units Q6HRS Angel Lema M.D.   Stopped at 06/29/17 0600   • glucose 4 g chewable tablet 16 g  16 g Q15 MIN PRN Angel Lema M.D.        And   • dextrose 50% (D50W) injection 25 mL  25 mL Q15 MIN PRN Angel Lema M.D.   0 mL at 06/26/17 2124   • heparin  injection 5,000 Units  5,000 Units Q8HRS Angel Lema M.D.   5,000 Units at 06/29/17 0501   • levothyroxine (SYNTHROID) tablet 75 mcg  75 mcg AM ES Carito Richards M.D.   75 mcg at 06/29/17 0509   • montelukast (SINGULAIR) tablet 10 mg  10 mg DAILY Carito Richards M.D.   10 mg at 06/28/17 1012   • acetaminophen (TYLENOL) tablet 650 mg  650 mg Q6HRS PRN Carito Richards M.D.       • ipratropium-albuterol (DUONEB) nebulizer solution 3 mL  3 mL 4X/DAY (RT) Angel Lema M.D.   3 mL at 06/28/17 1842     Last reviewed on 6/22/2017  2:52 PM by Rhea Cooper    Quality  Measures:  Labs reviewed, Medications reviewed and Radiology images reviewed  Mckeon catheter: No Mckeon      DVT Prophylaxis: Heparin  DVT prophylaxis - mechanical: SCDs  Ulcer prophylaxis: Yes  Antibiotics: Treating active infection/contamination beyond 24 hours perioperative coverage  Assessed for rehab: Patient was assess for and/or received rehabilitation services during this hospitalization      Assessment/Plan:  Ventilator-dependent respiratory failure - intubated 6/21-6/27              - encourage IS/PEP, OOB to chair, mobilization   - rt/02 protocol   - increase diuresis (added diuril, may need diamox)  Acute exacerbation of chronic obstructive pulmonary disease.              - bds, steroids (PO wean)  Severe sepsis, pulmonary source              - s/p sepsis protocol              - s/p abx for 7d  Health-care-associated pneumonia              - completed unasyn  Hyperactive delirium/Agitation - improving              - continue home psych/pain meds  Acute blood loss Anemia - 1u prbcs 6/23  Acute pulmonary edema secondary to acute on chronic diastolic heart failure              - cont diuresis  Hyperglycemia - IDDM + steroids              - short/long-acting insulin  Pulmonary HTN w RVSP of 60    Obstructive sleep apnea - qhs CPAP  Acute on CKD - monitor, avoid nephrotoxins  Hypernatremia - cont free  water  Systemic arterial HTN   Prophylaxis, enteral nutrition, therapies, d/c sykes and CVL    Awaiting transfer of patient out of ICU today. [facility] will continue to follow for now.    Discussed patient condition and risk of morbidity and/or mortality with RN, RT, Pharmacy, [facility] resident, Charge nurse / hot rounds and Patient.      INannette (Alla), am scribing for, and in the presence of, Jeremy Gonda M.D.   Electronically signed by: Nannette Nation (Alla), 6/29/2017  I, Jeremy Gonda M.D.  personally performed the services described in this documentation, as scribed by Nannette Nation in my presence, and it is both accurate and complete.

## 2017-06-29 NOTE — CARE PLAN
Problem: Bronchoconstriction:  Goal: Improve in air movement and diminished wheezing  Intervention: Implement inhaled treatments  Patient on QID duoneb and 6 lpm oxymask

## 2017-06-29 NOTE — DIETARY
"Nutrition Services: Weekly TF update    Current TF regimen: Peptamen Intense VHP @ goal rate 50 ml/hr, providing 1200 kcal, 112 grams protein, and 1008 ml free water per day.    Height: 163.8 cm (5' 4.5\")  Weight: 101.2 kg (223 lb 1.7 oz)  Ideal Body Weight: 55.566 kg (122 lb 8 oz)  Percent Ideal Body Weight: 182.1  Body mass index is 37.72 kg/(m^2).    Labs: Na 146, K+ 3.5, glu 135, BUN 77, GFR 49, POC glu/24 hours 127-233, pre-albumin 21.0 (WNL ), CRP 1.62 ()  Medications: Lasix, lantus, SSI, prednisone, senokot; prn bowel meds  Fluids: 200 ml free water flush 4x/day (added yesterday )  Skin: No breakdown noted    GI: Last BM     Estimated Needs: REE per MSJ = 1542 kcal/day (x1.1 = 1696 kcal/day)  Total Calories / day: 1550 - 1700 (Calories / kg: 15 - 17)  Total Grams Protein / day: 101 - 121 (Grams Protein / k - 1.2) (2.0 grams/kg IBW = 111 grams/day)        Assessment / Evaluation:  Admit day 8.  Pt extubated .  TF @ goal.  NPO per SLP evaluation .  Pt stable for transfer out of ICU. Nutritional needs re-estimated per change in acuity.  Abx and steroids to be completed today.    Plan / Recommendation:  Change TF to Diabetisource AC @ 55 ml/hr to provide 1584 kcal, 79 grams protein, and 1082 ml free water per day. Plus 1 scoop/packet Beneprotein 4x/day to provide an additional 100 kcal and 24 grams protein per day. Total intake = 1684 kcal and 103 grams protein per day.  Fluids per MD.  PO diet per SLP.    RD following.  "

## 2017-06-30 PROBLEM — I48.91 ATRIAL FIBRILLATION (HCC): Chronic | Status: ACTIVE | Noted: 2017-06-30

## 2017-06-30 PROBLEM — I10 CHRONIC HYPERTENSION: Status: ACTIVE | Noted: 2017-06-30

## 2017-06-30 PROBLEM — I10 CHRONIC HYPERTENSION: Chronic | Status: ACTIVE | Noted: 2017-06-30

## 2017-06-30 PROBLEM — I48.91 ATRIAL FIBRILLATION (HCC): Status: ACTIVE | Noted: 2017-06-30

## 2017-06-30 PROBLEM — A41.9 SEVERE SEPSIS(995.92): Status: ACTIVE | Noted: 2017-06-30

## 2017-06-30 PROBLEM — E11.9 DM2 (DIABETES MELLITUS, TYPE 2) (HCC): Chronic | Status: ACTIVE | Noted: 2017-06-30

## 2017-06-30 PROBLEM — R65.20 SEVERE SEPSIS(995.92): Status: ACTIVE | Noted: 2017-06-30

## 2017-06-30 PROBLEM — E06.9 THYROIDITIS: Status: ACTIVE | Noted: 2017-06-30

## 2017-06-30 LAB
ALBUMIN SERPL BCP-MCNC: 3.1 G/DL (ref 3.2–4.9)
ANION GAP SERPL CALC-SCNC: 11 MMOL/L (ref 0–11.9)
BASOPHILS # BLD AUTO: 0.1 % (ref 0–1.8)
BASOPHILS # BLD: 0.02 K/UL (ref 0–0.12)
BUN SERPL-MCNC: 82 MG/DL (ref 8–22)
BUN SERPL-MCNC: 82 MG/DL (ref 8–22)
CALCIUM SERPL-MCNC: 8.9 MG/DL (ref 8.5–10.5)
CALCIUM SERPL-MCNC: 9 MG/DL (ref 8.5–10.5)
CHLORIDE SERPL-SCNC: 100 MMOL/L (ref 96–112)
CHLORIDE SERPL-SCNC: 99 MMOL/L (ref 96–112)
CO2 SERPL-SCNC: 30 MMOL/L (ref 20–33)
CO2 SERPL-SCNC: 32 MMOL/L (ref 20–33)
CREAT SERPL-MCNC: 1.21 MG/DL (ref 0.5–1.4)
CREAT SERPL-MCNC: 1.21 MG/DL (ref 0.5–1.4)
EOSINOPHIL # BLD AUTO: 0.61 K/UL (ref 0–0.51)
EOSINOPHIL NFR BLD: 3.5 % (ref 0–6.9)
ERYTHROCYTE [DISTWIDTH] IN BLOOD BY AUTOMATED COUNT: 60.1 FL (ref 35.9–50)
GFR SERPL CREATININE-BSD FRML MDRD: 44 ML/MIN/1.73 M 2
GFR SERPL CREATININE-BSD FRML MDRD: 44 ML/MIN/1.73 M 2
GLUCOSE BLD-MCNC: 169 MG/DL (ref 65–99)
GLUCOSE BLD-MCNC: 174 MG/DL (ref 65–99)
GLUCOSE BLD-MCNC: 206 MG/DL (ref 65–99)
GLUCOSE BLD-MCNC: 282 MG/DL (ref 65–99)
GLUCOSE SERPL-MCNC: 253 MG/DL (ref 65–99)
GLUCOSE SERPL-MCNC: 254 MG/DL (ref 65–99)
HCT VFR BLD AUTO: 30.7 % (ref 37–47)
HGB BLD-MCNC: 9.5 G/DL (ref 12–16)
IMM GRANULOCYTES # BLD AUTO: 0.3 K/UL (ref 0–0.11)
IMM GRANULOCYTES NFR BLD AUTO: 1.7 % (ref 0–0.9)
LYMPHOCYTES # BLD AUTO: 2.16 K/UL (ref 1–4.8)
LYMPHOCYTES NFR BLD: 12.4 % (ref 22–41)
MCH RBC QN AUTO: 29.1 PG (ref 27–33)
MCHC RBC AUTO-ENTMCNC: 30.9 G/DL (ref 33.6–35)
MCV RBC AUTO: 94.2 FL (ref 81.4–97.8)
MONOCYTES # BLD AUTO: 0.99 K/UL (ref 0–0.85)
MONOCYTES NFR BLD AUTO: 5.7 % (ref 0–13.4)
NEUTROPHILS # BLD AUTO: 13.32 K/UL (ref 2–7.15)
NEUTROPHILS NFR BLD: 76.6 % (ref 44–72)
NRBC # BLD AUTO: 0 K/UL
NRBC BLD AUTO-RTO: 0 /100 WBC
PHOSPHATE SERPL-MCNC: 3.8 MG/DL (ref 2.5–4.5)
PLATELET # BLD AUTO: 238 K/UL (ref 164–446)
PMV BLD AUTO: 10 FL (ref 9–12.9)
POTASSIUM SERPL-SCNC: 3.6 MMOL/L (ref 3.6–5.5)
POTASSIUM SERPL-SCNC: 3.7 MMOL/L (ref 3.6–5.5)
RBC # BLD AUTO: 3.26 M/UL (ref 4.2–5.4)
SODIUM SERPL-SCNC: 141 MMOL/L (ref 135–145)
SODIUM SERPL-SCNC: 142 MMOL/L (ref 135–145)
WBC # BLD AUTO: 17.4 K/UL (ref 4.8–10.8)

## 2017-06-30 PROCEDURE — A9270 NON-COVERED ITEM OR SERVICE: HCPCS | Performed by: INTERNAL MEDICINE

## 2017-06-30 PROCEDURE — 80048 BASIC METABOLIC PNL TOTAL CA: CPT

## 2017-06-30 PROCEDURE — 700102 HCHG RX REV CODE 250 W/ 637 OVERRIDE(OP): Performed by: INTERNAL MEDICINE

## 2017-06-30 PROCEDURE — 94640 AIRWAY INHALATION TREATMENT: CPT

## 2017-06-30 PROCEDURE — 700101 HCHG RX REV CODE 250: Performed by: INTERNAL MEDICINE

## 2017-06-30 PROCEDURE — 700111 HCHG RX REV CODE 636 W/ 250 OVERRIDE (IP): Performed by: INTERNAL MEDICINE

## 2017-06-30 PROCEDURE — 80069 RENAL FUNCTION PANEL: CPT

## 2017-06-30 PROCEDURE — 770006 HCHG ROOM/CARE - MED/SURG/GYN SEMI*

## 2017-06-30 PROCEDURE — 85025 COMPLETE CBC W/AUTO DIFF WBC: CPT

## 2017-06-30 PROCEDURE — 82962 GLUCOSE BLOOD TEST: CPT

## 2017-06-30 PROCEDURE — 92526 ORAL FUNCTION THERAPY: CPT

## 2017-06-30 RX ORDER — ALUMINA, MAGNESIA, AND SIMETHICONE 2400; 2400; 240 MG/30ML; MG/30ML; MG/30ML
10 SUSPENSION ORAL 2 TIMES DAILY PRN
Status: DISCONTINUED | OUTPATIENT
Start: 2017-06-30 | End: 2017-07-03 | Stop reason: HOSPADM

## 2017-06-30 RX ADMIN — OXYCODONE HYDROCHLORIDE 10 MG: 10 TABLET ORAL at 07:51

## 2017-06-30 RX ADMIN — AMLODIPINE BESYLATE 10 MG: 5 TABLET ORAL at 07:52

## 2017-06-30 RX ADMIN — HEPARIN SODIUM 5000 UNITS: 5000 INJECTION, SOLUTION INTRAVENOUS; SUBCUTANEOUS at 15:20

## 2017-06-30 RX ADMIN — INSULIN LISPRO 2 UNITS: 100 INJECTION, SOLUTION INTRAVENOUS; SUBCUTANEOUS at 05:57

## 2017-06-30 RX ADMIN — GABAPENTIN 600 MG: 300 CAPSULE ORAL at 20:16

## 2017-06-30 RX ADMIN — INSULIN GLARGINE 30 UNITS: 100 INJECTION, SOLUTION SUBCUTANEOUS at 05:58

## 2017-06-30 RX ADMIN — LEVOTHYROXINE SODIUM 75 MCG: 75 TABLET ORAL at 07:51

## 2017-06-30 RX ADMIN — INSULIN LISPRO 5 UNITS: 100 INJECTION, SOLUTION INTRAVENOUS; SUBCUTANEOUS at 11:32

## 2017-06-30 RX ADMIN — DULOXETINE HYDROCHLORIDE 60 MG: 60 CAPSULE, DELAYED RELEASE ORAL at 07:52

## 2017-06-30 RX ADMIN — IPRATROPIUM BROMIDE AND ALBUTEROL SULFATE 3 ML: .5; 3 SOLUTION RESPIRATORY (INHALATION) at 19:37

## 2017-06-30 RX ADMIN — FUROSEMIDE 40 MG: 10 INJECTION, SOLUTION INTRAMUSCULAR; INTRAVENOUS at 20:15

## 2017-06-30 RX ADMIN — HEPARIN SODIUM 5000 UNITS: 5000 INJECTION, SOLUTION INTRAVENOUS; SUBCUTANEOUS at 05:58

## 2017-06-30 RX ADMIN — MONTELUKAST SODIUM 10 MG: 10 TABLET, FILM COATED ORAL at 07:56

## 2017-06-30 RX ADMIN — GABAPENTIN 600 MG: 300 CAPSULE ORAL at 07:51

## 2017-06-30 RX ADMIN — OXYCODONE HYDROCHLORIDE 10 MG: 10 TABLET ORAL at 18:09

## 2017-06-30 RX ADMIN — IPRATROPIUM BROMIDE AND ALBUTEROL SULFATE 3 ML: .5; 3 SOLUTION RESPIRATORY (INHALATION) at 12:34

## 2017-06-30 RX ADMIN — FUROSEMIDE 40 MG: 10 INJECTION, SOLUTION INTRAMUSCULAR; INTRAVENOUS at 07:52

## 2017-06-30 RX ADMIN — CARVEDILOL 3.12 MG: 6.25 TABLET, FILM COATED ORAL at 07:52

## 2017-06-30 RX ADMIN — FUROSEMIDE 40 MG: 10 INJECTION, SOLUTION INTRAMUSCULAR; INTRAVENOUS at 15:20

## 2017-06-30 RX ADMIN — INSULIN LISPRO 2 UNITS: 100 INJECTION, SOLUTION INTRAVENOUS; SUBCUTANEOUS at 23:55

## 2017-06-30 RX ADMIN — INSULIN LISPRO 3 UNITS: 100 INJECTION, SOLUTION INTRAVENOUS; SUBCUTANEOUS at 18:12

## 2017-06-30 RX ADMIN — PREDNISONE 40 MG: 20 TABLET ORAL at 07:52

## 2017-06-30 RX ADMIN — IPRATROPIUM BROMIDE AND ALBUTEROL SULFATE 3 ML: .5; 3 SOLUTION RESPIRATORY (INHALATION) at 07:42

## 2017-06-30 RX ADMIN — IPRATROPIUM BROMIDE AND ALBUTEROL SULFATE 3 ML: .5; 3 SOLUTION RESPIRATORY (INHALATION) at 16:51

## 2017-06-30 RX ADMIN — TRAZODONE HYDROCHLORIDE 300 MG: 150 TABLET ORAL at 21:25

## 2017-06-30 RX ADMIN — GABAPENTIN 600 MG: 300 CAPSULE ORAL at 15:20

## 2017-06-30 RX ADMIN — HEPARIN SODIUM 5000 UNITS: 5000 INJECTION, SOLUTION INTRAVENOUS; SUBCUTANEOUS at 20:15

## 2017-06-30 RX ADMIN — CARVEDILOL 3.12 MG: 6.25 TABLET, FILM COATED ORAL at 18:10

## 2017-06-30 ASSESSMENT — ENCOUNTER SYMPTOMS
HEADACHES: 0
ABDOMINAL PAIN: 0
CONSTIPATION: 0
COUGH: 0
SORE THROAT: 1
WHEEZING: 0
VOMITING: 0
PALPITATIONS: 0
NAUSEA: 0
FOCAL WEAKNESS: 0
CHILLS: 0
SPUTUM PRODUCTION: 0
BRUISES/BLEEDS EASILY: 1
LOSS OF CONSCIOUSNESS: 0
SHORTNESS OF BREATH: 0
FEVER: 0

## 2017-06-30 ASSESSMENT — PAIN SCALES - GENERAL
PAINLEVEL_OUTOF10: 2
PAINLEVEL_OUTOF10: 6
PAINLEVEL_OUTOF10: 0
PAINLEVEL_OUTOF10: 6
PAINLEVEL_OUTOF10: 0
PAINLEVEL_OUTOF10: 7

## 2017-06-30 NOTE — PROGRESS NOTES
Pulmonary Critical Care Progress Note      Date of Service: 6/30/17    Chief Complaint: Shortness of Breath    History of Present Illness: This is a 66-year-old lady who has a history of chronic hypoxemic respiratory failure and chronic obstructive pulmonary disease.  She was hospitalized at [facility] from on or about May 7th to 05/10/2017, with an ankle fracture.  She was then readmitted to [facility] from on or about June 2nd to 06/09/2017, with ventilator dependent respiratory failure and healthcare-associated pneumonia.  She was subsequently sent to a skilled nursing facility.  She now comes back to the emergency room this evening because of increasing shortness of breath.  She was brought in here with wheezing and coarse crackles.  She was in respiratory distress.  She was intubated.  She is now on full mechanical ventilatory support.  Once again, she has been more residing at a skilled nursing facility.    ROS:    Respiratory: negative cough, negative shortness of breath and negative sputum production,   Cardiac: negative chest pain and negative palpitations,   GI: negative nausea, negative vomiting and negative abdominal pain.    All other systems negative.      Interval Events:  24 hour interval history reviewed     Passed speech evaluation this morning  Mobilizing to edge of bed.      PFSH:  No change.      Respiratory:     Pulse Oximetry: 93 % 6L Facemask           IS 1500 this morning.    Exam: Improved rhonchi at bases, no wheezes, breathing comfortably, speaking in full sentences though voice remains hoarse.    ImagingCXR  I have personally reviewed the chest x-ray my impression is  no CXR today.    Recent Labs      06/28/17   1052   ISTATAPH  7.397*   ISTATAPCO2  38.9*   ISTATAPO2  78   ISTATATCO2  25   [id number]  95   ISTATARTHCO3  23.9   ISTATARTBE  -1   ISTATTEMP  96.2 F   ISTATFIO2  40   ISTATSPEC  Arterial   ISTATAPHTC  7.416   [id number]  71   Blood gas shows: none today       HemoDynamics:  Pulse:  68, Heart Rate (Monitored): 62  NIBP: 151/65 mmHg       Sinus rhythm in the 60s.  BPs 140-160s.    Exam: RRR, no murmur, Improving lower extremity edema    Imaging: Available data reviewed        Neuro:  GCS Total [place] Coma Score: 15    Exam: A&O x 4, generalized weakness improving, no focal deficits, Passed SLP.    Imaging: Available data reviewed      Fluids:  Intake/Output       06/28/17 0700 - 06/29/17 0659 06/29/17 0700 - 06/30/17 0659 06/30/17 0700 - 07/01/17 0659      0700-1859 4467-1967 Total 5884-2429 7339-1474 Total 4902-3338 0890-0289 Total       Intake    I.V.  310  120 430  20  20 40  --  -- --    IV Volume (TKO) 110 120 230 20 20 40 -- -- --    IV Piggyback Volume (IV Piggyback) 200 -- 200 -- -- -- -- -- --    Other  145  150 295  320  60 380  --  -- --    Medications (P.O./ Enteral Liquids) 145 150 295 320 60 380 -- -- --    Enteral  1200  1220 2420  800  470 1270  --  -- --    Enteral Volume  100 110 210 -- -- --    Free Water / Tube Flush   -- -- --    Total Intake 1655 1490 3145 6504 743 7974 -- -- --       Output    Urine  1025  640 1665  600  2700 3300  200  -- 200    Number of Times Voided 1 x 1 x 2 x 2 x 14 x 16 x -- -- --    Number of Times Incontinent of Urine 5 x 4 x 9 x 10 x 1 x 11 x 1 x -- 1 x    Indwelling Cathether 525 -- 525 -- -- -- -- -- --    Void (ml)  600 2700 3300 200 -- 200    Drains  --  -- --  --  0 0  --  -- --    Residual Amount (ml) (Discarded) -- -- -- -- 0 0 -- -- --    Stool/Urine  --  -- --  --  -- --  2  -- 2    Mixed Stool / Urine (ml) -- -- -- -- -- -- 2 -- 2    Stool  --  -- --  --  -- --  --  -- --    Number of Times Stooled 0 x 0 x 0 x 0 x -- 0 x 2 x -- 2 x    Total Output 1199 726 8315 600 2700 3300 202 -- 202       Net I/O      540 -2150 -1610 -202 -- -202           Recent Labs      06/28/17   0615  06/29/17   0437  06/30/17   0847   SODIUM  148*  146*  141  142   POTASSIUM  4.0  3.5*  3.6  3.7    CHLORIDE  114*  109  100  99   CO2  25  30  30  32   BUN  71*  77*  82*  82*   CREATININE  1.08  1.10  1.21  1.21   PHOSPHORUS   --    --   3.8   CALCIUM  9.2  9.1  8.9  9.0       GI/Nutrition:    Exam: Obese, soft, NT, Normal bowel sounds, tolerating TF    Imaging: Available data reviewed   NPO and tube feed Tolerated     Last bowel movement today.  Passed swallow eval this morning.    Liver Function  Recent Labs      17   0615  17   0437  17   0847   GLUCOSE  249*  135*  254*  253*       Heme:  Recent Labs      17   0615  17   0847   RBC  2.97*  3.26*   HEMOGLOBIN  8.5*  9.5*   HEMATOCRIT  28.3*  30.7*   PLATELETCT  286  238       Infectious Disease:  Temp  Av.9 °C (98.4 °F)  Min: 36.7 °C (98.1 °F)  Max: 37.1 °C (98.8 °F)   7 day course Unasyn completed   Cultures negative    Micro: reviewed  Recent Labs      17   0615  17   0847   WBC  19.5*  17.4*   NEUTSPOLYS  89.10*  76.60*   LYMPHOCYTES  2.40*  12.40*   MONOCYTES  4.30  5.70   EOSINOPHILS  0.00  3.50   BASOPHILS  0.20  0.10     Current Facility-Administered Medications   Medication Dose Frequency Provider Last Rate Last Dose   • predniSONE (DELTASONE) tablet 40 mg  40 mg DAILY Carito Richards M.D.   40 mg at 17 0752   • lorazepam (ATIVAN) injection 1 mg  1 mg Q6HRS PRN Carito Richards M.D.       • oxycodone immediate-release (ROXICODONE) tablet 5 mg  5 mg Q4HRS PRALLYSSA Richards M.D.        Or   • oxycodone immediate release (ROXICODONE) tablet 10 mg  10 mg Q4HRS PRALLYSSA Richards M.D.   10 mg at 17 0751   • morphine (pf) 4 mg/ml injection 2 mg  2 mg Q4HRS PRALLYSSA Richards M.D.   2 mg at 17 0116   • furosemide (LASIX) injection 40 mg  40 mg TID Jeremy M Gonda, M.D.   40 mg at 17 0752   • insulin glargine (LANTUS) injection 30 Units  30 Units [facility] INSULIN Carito Richards M.D.   30 Units at 17 0558   • carvedilol (COREG)  tablet 3.125 mg  3.125 mg BID WITH MEALS Carito Richards M.D.   3.125 mg at 06/30/17 0752   • gabapentin (NEURONTIN) capsule 600 mg  600 mg TID William Hope M.D.   600 mg at 06/30/17 0751   • amlodipine (NORVASC) tablet 10 mg  10 mg DAILY William Hope M.D.   10 mg at 06/30/17 0752   • duloxetine (CYMBALTA) capsule 60 mg  60 mg DAILY William Hope M.D.   60 mg at 06/30/17 0752   • trazodone (DESYREL) tablet 300 mg  300 mg Nightly William Hope M.D.   300 mg at 06/29/17 2021   • hydrALAZINE (APRESOLINE) injection 10 mg  10 mg Q4HRS PRN Carito Richards M.D.   10 mg at 06/29/17 0510   • hydrOXYzine (ATARAX) tablet 50 mg  50 mg TID PRN Carito Richards M.D.   50 mg at 06/27/17 2315   • Respiratory Care per Protocol   Continuous RT Angel Lema M.D.       • senna-docusate (PERICOLACE or SENOKOT S) 8.6-50 MG per tablet 2 Tab  2 Tab BID Angel Lema M.D.   Stopped at 06/27/17 0900    And   • polyethylene glycol/lytes (MIRALAX) PACKET 1 Packet  1 Packet QDAY PRN Angel Lema M.D.        And   • magnesium hydroxide (MILK OF MAGNESIA) suspension 30 mL  30 mL QDAY PRN Angel Lema M.D.        And   • bisacodyl (DULCOLAX) suppository 10 mg  10 mg QDAY PRN Angel Lema M.D.       • ipratropium-albuterol (DUONEB) nebulizer solution 3 mL  3 mL Q2HRS PRN (RT) Angel Lema M.D.       • insulin lispro (HUMALOG) injection 2-9 Units  2-9 Units Q6HRS Angel Lema M.D.   2 Units at 06/30/17 0557   • glucose 4 g chewable tablet 16 g  16 g Q15 MIN PRN Angel Lema M.D.        And   • dextrose 50% (D50W) injection 25 mL  25 mL Q15 MIN PRN Angel Lema M.D.   0 mL at 06/26/17 2124   • heparin injection 5,000 Units  5,000 Units Q8HRS Angel Lema M.D.   5,000 Units at 06/30/17 0558   • levothyroxine (SYNTHROID) tablet 75 mcg  75 mcg AM KYREE Richards M.D.   75 mcg at 06/30/17 0751   • montelukast  (SINGULAIR) tablet 10 mg  10 mg DAILY Carito Richards M.D.   10 mg at 06/30/17 0756   • acetaminophen (TYLENOL) tablet 650 mg  650 mg Q6HRS PRN Carito Richards M.D.       • ipratropium-albuterol (DUONEB) nebulizer solution 3 mL  3 mL 4X/DAY (RT) Angel Lema M.D.   3 mL at 06/30/17 0742     Last reviewed on 6/22/2017  2:52 PM by Rhea Cooper    Quality  Measures:  Labs reviewed, Medications reviewed and Radiology images reviewed  Mckeon catheter: No Mckeon      DVT Prophylaxis: Heparin  DVT prophylaxis - mechanical: SCDs  Ulcer prophylaxis: Yes  Antibiotics: Treating active infection/contamination beyond 24 hours perioperative coverage  Assessed for rehab: Patient was assess for and/or received rehabilitation services during this hospitalization      Assessment/Plan:  Ventilator-dependent respiratory failure - intubated 6/21-6/27              - encourage IS/PEP, OOB to chair, mobilization   - rt/02 protocol   - cont diuresis   Acute exacerbation of chronic obstructive pulmonary disease.              - bds, steroids (PO wean)  Severe sepsis, pulmonary source              - s/p sepsis protocol              - s/p abx for 7d  Health-care-associated pneumonia              - completed unasyn  Hyperactive delirium/Agitation - improving              - continue home psych/pain meds  Acute blood loss Anemia - 1u prbcs 6/23  Acute pulmonary edema secondary to acute on chronic diastolic heart failure              - cont diuresis  Hyperglycemia - IDDM + steroids              - short/long-acting insulin  Pulmonary HTN w RVSP of 60    Obstructive sleep apnea - qhs CPAP  Acute on CKD - monitor, avoid nephrotoxins  Hypernatremia - cont free water  Systemic arterial HTN   Prophylaxis, diet, therapies    Awaiting transfer of patient out of ICU today.     Discussed patient condition and risk of morbidity and/or mortality with RN, RT, Pharmacy, [employer] resident, Charge nurse / hot rounds and Patient.       I, Abdias Ramirez ([place]), am scribing for, and in the presence of, Jeremy M Gonda, M.D.  Electronically signed by: Abdias Ramirez (Alla), 6/30/2017  I, Jeremy M Gonda, M.D. personally performed the services described in this documentation, as scribed by Abdias Ramirez in my presence, and it is both accurate and complete.

## 2017-06-30 NOTE — DIETARY
Nutrition Services: update     Pt is on day 9 of admit. She was receiving nutrition support via cortrak.  Per SLP pt is now cleared for a DM, Dysphagia 2 diet w/NTL.  She has a good po intake thus far and cortrak to be removed later today per discussion w/RN.     Recommendation:   · Advance po diet as pt is able per SLP   Record percentage of meals conusmed in ADLs to help monitor po adequacy      RD following

## 2017-06-30 NOTE — CARE PLAN
Problem: Bowel/Gastric:  Goal: Normal bowel function is maintained or improved  Outcome: PROGRESSING AS EXPECTED  Last BM 6/30.    Problem: Mobility  Goal: Risk for activity intolerance will decrease  Outcome: PROGRESSING AS EXPECTED  Patient tolerating edge of bed. Will attempt to get patient in chair for lunch.

## 2017-06-30 NOTE — PROGRESS NOTES
[facility] ICU Progress Note      Admit Date: 6/21/2017  ICU Day: 9    Resident(s):   Attending: HORACE RYAN/ Dr. Gonda    Date & Time:   6/30/2017   1:43 PM       Patient ID:    Name:             Priya Callahan   YOB: 1950  Age:                 66 y.o.  female   MRN:               [id number]    HPI:  67 y/o female admitted to ICU after intubation for AHRF 2/2 AECOPD.     Consultants:  PMA: Dr. Hope/Gonda    Interval Events:  06/22/2017: Remains on vent, starting tube feeds.  Bronch'd today with tan secretions.  Cultures NGTD.  Continue current ABX.  Hyperkalemia resolved.  Renal function stable. Start lantus 15u for elevated sugars.      06/23/17 : No significant overnight events. She is on Vent support /20/8/35% FiO2 Her ABG today 7.35/44/79/24.3 getting better. On Propofol , Pressors and TF @45.  Recieving Abx Zosyn. Her HBG was 6.7 in AM , 1U PRBC given. Will recheck at 4.30 PM. SBT trials, Mobilization and plans of Extubation if she tolerates SBT.    06/24/17: Agitated overnight, biting on tube, bite guard placed, O2 improved.  Good UOP, CVP: 17.  Repleted electrolytes.  Cultures negative, de-escalate ABX, stop vanco/zosyn, start unasyn.  DC PICC line.  Repeat procalcitonin.  Reduce steroid frequency from Q6H to Q8H.    Sugars uncontrolled, Lantus 25u QAM.  Tolerated 2 hours SBT yesterday, no SBT yet today.  Possibly extubate today.      6/25 - agitated overnight requiring mutilple doses of ativan.  Appeared very uncomfortable on decreased doses of sedation today with increasing RR and decreasing sats.  Propofol gtt resumed. Cultures still ngtd.    6/26 - home meds resumed overnight and BP and agitation slightly better; will attempt SBTs today; start free water flushes for mild hypernatremia; continue to wean steriods    6/27 - tolerated SBT x 1 hr yesterday, will attempt to extubate today; no ativan needed ovenright; continue to wean off precidex and propofol as able;  will complete unasyn today; BP stable on home meds; sugars high on SSI, lantus increased today; continue solumedrol today and transition to prednisone tomorrow; will increase lasix today    6/28 - extubated yesterday, now on 5L oxymask, wean as able; stable overnight; BP on higher side overnight but improved this AM after resuming coreg last night; bradycardia resolved off precidex and propofol drip; abx to end today, will wean to po steroids today; continue cortrak until SLP clearance, increase free water; remove sykes and replace CVC with PIV; continue diuretics and RT therapies; PT eval today; continue to monitor in unit today    6/29 - PT/OT/SLP working with patient; still NPO with TF per speech; required prn hydralazine overnight; no ativan needed overnight; doing well on 5L O2, ABG normalized, will contiue RT and wean down O2 as able; continue diuresis with lasix, added diuril today; stable for transfer to floor.    06/30/2017 - No significant overnight events. Saturating well on 4L Oxymask.   SLP cleared to start her on Dysphagia II diet .   She is on PO prednisone till 07/02/2017.  Awaiting for Bed on Floor, Transferred to [facility] team.      Review of Systems   Constitutional: Negative for fever and chills.   HENT: Positive for sore throat.    Respiratory: Negative for cough, sputum production, shortness of breath and wheezing.    Cardiovascular: Negative for chest pain and palpitations.   Gastrointestinal: Negative for nausea, vomiting, abdominal pain and constipation.   Genitourinary: Negative for dysuria, urgency and frequency.   Musculoskeletal:        + low back and L ankle pain   Neurological: Negative for focal weakness, loss of consciousness and headaches.   Endo/Heme/Allergies: Bruises/bleeds easily.       PHYSICAL EXAM  Gen: NAD  HEENT: NC/AT, no scleral icterus, no conjunctival injection, mucous membranes pink and moist.  Neck: Supple, no lymphadenopathy.  Cardiac: S1S2, RRR, no m/r/g, no  JVD.  Respiratory: Normal effort, symmetrical, symmetrical breath sounds; no rales/ronchi  Abdomen: BS+, soft, NT/ND, no rebound/guarding, no palpable organomegaly.  Ext: 2+ b/l pitting pedal edema, 2+ DP pulses b/l.  Skin: Warm, dry. No rashes or erythema. Multiple bruises over extermities  Neuro: alert and oriented; no focal deficits    Respiratory:     Respiration: 16, Pulse Oximetry: 97 %, O2 Daily Delivery Respiratory : Nasal Cannula    Chest Tube Drains:    Recent Labs      06/28/17   1052   ISTATAPH  7.397*   ISTATAPCO2  38.9*   ISTATAPO2  78   ISTATATCO2  25   [id number]  95   ISTATARTHCO3  23.9   ISTATARTBE  -1   ISTATTEMP  96.2 F   ISTATFIO2  40   ISTATSPEC  Arterial   ISTATAPHTC  7.416   [id number]  71       HemoDynamics:  Pulse: 61 NIBP: 151/65 mmHg     Neuro:      Fluids:    Date 06/30/17 0700 - 07/01/17 0659   Shift 1858-1378 3057-3754 2992-6747 24 Hour Total   I  N  T  A  K  E   P.O. 570   570      P.O. 570   570    Enteral 110   110      Enteral Volume 110   110    Shift Total 680   680   O  U  T  P  U  T   Urine 500   500      Number of Times Voided 2 x   2 x      Number of Times Incontinent of Urine 1 x   1 x      Void (ml) 500   500    Stool/Urine 2   2      Mixed Stool / Urine (ml) 2   2    Stool          Number of Times Stooled 2 x   2 x    Shift Total 502   502      178        Intake/Output Summary (Last 24 hours) at 06/25/17 0700  Last data filed at 06/25/17 0600   Gross per 24 hour   Intake 2948.63 ml   Output   2680 ml   Net 268.63 ml          Body mass index is 37.72 kg/(m^2).    Recent Labs      06/28/17   0615  06/29/17   0437  06/30/17   0847   SODIUM  148*  146*  141  142   POTASSIUM  4.0  3.5*  3.6  3.7   CHLORIDE  114*  109  100  99   CO2  25  30  30  32   BUN  71*  77*  82*  82*   CREATININE  1.08  1.10  1.21  1.21   PHOSPHORUS   --    --   3.8   CALCIUM  9.2  9.1  8.9  9.0       GI/Nutrition:  Recent Labs      06/28/17   0615  06/29/17   0437  06/30/17   0824    GLUCOSE  249*  135*  254*  253*       Heme:  Recent Labs      17   0615  17   0847   RBC  2.97*  3.26*   HEMOGLOBIN  8.5*  9.5*   HEMATOCRIT  28.3*  30.7*   PLATELETCT  286  238       Infectious Disease:  Temp  Av.9 °C (98.4 °F)  Min: 36.7 °C (98.1 °F)  Max: 37.1 °C (98.8 °F)  Recent Labs      17   0615  17   0847   WBC  19.5*  17.4*   NEUTSPOLYS  89.10*  76.60*   LYMPHOCYTES  2.40*  12.40*   MONOCYTES  4.30  5.70   EOSINOPHILS  0.00  3.50   BASOPHILS  0.20  0.10       Meds:  • predniSONE  40 mg     • lorazepam  1 mg     • oxycodone immediate-release  5 mg      Or   • oxycodone immediate-release  10 mg     • morphine injection  2 mg     • furosemide  40 mg     • insulin glargine  30 Units     • carvedilol  3.125 mg     • gabapentin  600 mg     • amlodipine  10 mg     • duloxetine  60 mg     • trazodone  300 mg     • hydrALAZINE  10 mg     • hydrOXYzine  50 mg     • Respiratory Care per Protocol       • senna-docusate  2 Tab      And   • polyethylene glycol/lytes  1 Packet      And   • magnesium hydroxide  30 mL      And   • bisacodyl  10 mg     • ipratropium-albuterol  3 mL     • insulin lispro  2-9 Units     • glucose 4 g  16 g      And   • dextrose 50%  25 mL     • heparin  5,000 Units     • levothyroxine  75 mcg     • montelukast  10 mg     • acetaminophen  650 mg     • ipratropium-albuterol  3 mL          Procedures:   - intuabation, bronchoscopy, and R IJV CVC placement     - extubation    Imaging:  DX-CHEST-PORTABLE (1 VIEW)   Final Result         1. Interval extubation. No significant interval change.      DX-CHEST-PORTABLE (1 VIEW)   Final Result         1.  Pulmonary edema and/or infiltrates are identified, which are stable since the prior exam.   2.  Cardiomegaly      DX-CHEST-PORTABLE (1 VIEW)   Final Result         1.  Pulmonary edema and/or infiltrates are identified, which appear somewhat increased since the prior exam.   2.  Cardiomegaly      DX-ABDOMEN FOR TUBE  PLACEMENT   Final Result      Enteric tube has been placed and the tip projects over the stomach.      DX-CHEST-PORTABLE (1 VIEW)   Final Result      Findings consistent with pulmonary edema, with no significant change.      DX-CHEST-PORTABLE (1 VIEW)   Final Result      Stable chest appearance compared with 6/23.      DX-CHEST-PORTABLE (1 VIEW)   Final Result      Stable chest appearance compared with 6/22.      DX-ABDOMEN FOR TUBE PLACEMENT   Final Result      Feeding tube and nasogastric tube in place as noted above.      DX-CHEST-PORTABLE (1 VIEW)   Final Result      Right central venous line in place, with no pneumothorax. Improving pulmonary edema.      DX-CHEST-PORTABLE (1 VIEW)   Final Result      Mid and lower zone opacities, most consistent with pulmonary edema, overall worse compared with 6/13/2017.          Problem and Plan:    ACUTE HYPOXIC RESPIRATORY FAILURE  CHRONIC RESPIRATORY FAILURE  AECOPD  - CXR showed worsened bilateral LL infiltrates on admission; echo normal in 3/2017; LE US neg for DVT in 6/2017; CTPE neg in 6/2017; echo in 2015 shows normal EF with G2 diastolic dysfunction and pulmonary htn; likely 2/2 COPD exacerbation 2/2 HCAP; intubated 6/21-27, now on 5L oxymask; RT protocol, wean as able; continue prednisone; abx completed; CXR stable, continue diuresis; prn hydroxyzine/ativan for anxiety    SEPSIS 2/2 RESPIRATORY SOURCE  HCAP  LEUKOCYTOSIS  - multiple recent hospital admissions from nursing home; cultures from prior admission all neg; UA neg; CXR shows worsened infiltrates on admission; bl/urine/BAL cultures NGTD this admission; procalcitonin trending down; WBC normalized; completed 7 day course of unasyn; continue TF at goal until cleared for diet by SLP    ACUTE ON CHRONIC NORMOCYTIC ANEMIA - s/p 1 U pRBC on 6/23; h/h stable; no e/o bleeding    HYPERKALEMIA - resolved    HYPERNATREMIA - mild; continue free water flushes until tolerating diet    CKD-3 - stable at baseline; avoid  nephrotoxins    DM2 WITH NEUROPATHY - a1c 5.2; Lantus and SSI with Accuchecks; hypoglycemia protocol; continue home gabapentin; started ACEI today    HTN -  continue home amlodipine and coreg; hydralazine prn; add lisinopril today    CHRONIC HEP C - stable    FIBROMYALGIA - continue home trazodone and cymbalta and gabpentin    ASTHMA - continue monteleukast    HYPOTHYROIDISM - continue home synthroid    RECENT R ANKLE FRACTURE - s/p ORIF; use brace for comfort; pain control; PT/OT on board    DISPO: transfer to floor    CODE STATUS: FULL    Quality Measures:  Mckeon Catheter: no  DVT Prophylaxix: heparin  Ulcer Prophylaxis: no  Antibiotics: none  Lines: PIV

## 2017-06-30 NOTE — CARE PLAN
Problem: Skin Integrity  Goal: Risk for impaired skin integrity will decrease  Intervention: Assess and monitor skin integrity, appearance and/or temperature  Turn patient Q2/h and evaluate skin.  Pillows in place for positioning.       Problem: Mobility  Goal: Risk for activity intolerance will decrease  Intervention: Encourage patient to increase activity level in collaboration with Interdisciplinary Team  EOB mobilization, pt to turn self for toileting and encourage ROM while in bed.

## 2017-06-30 NOTE — THERAPY
"Speech Language Therapy dysphagia treatment completed.   Functional Status:  Patient seen for dysphagia therapy on this date.  Patient pleasant and agreeable; AA&O x4 and stated she was anxious to eat.  Presentation of PO included nectars, pudding, soft solids and thin liquids.  The patient presented with adequate mastication and bolus manipulation, mildly delayed initiation of swallow trigger and laryngeal elevation palpated as weak but complete.  She had mild oral residue which cleared with a liquid wash.  The patient had strong reflexive coughing x1 of 3 sips of thin liquids but no overt s/sx of aspiration with any other consistency consumed.  Provided instruction and training to dysphagia exercises.  She completed 10/10 reps of TBR, Gricelda, pharyngeal squeeze and chin tuck against resistance all with \"fair to good\" accuracy, given min verbal and visual cues.    Recommendations: At this time, recommend dysphagia 2/nectars diet with close monitoring during meals.  OK to pull the Cortrak, once the patient is consuming >50% of meals without difficulty.  SLP following    Plan of Care: Will benefit from Speech Therapy 3 times per week  Post-Acute Therapy: Discharge to a transitional care facility for continued skilled therapy services.    See \"Rehab Therapy-Acute\" Patient Summary Report for complete documentation.     "

## 2017-07-01 LAB
GLUCOSE BLD-MCNC: 133 MG/DL (ref 65–99)
GLUCOSE BLD-MCNC: 188 MG/DL (ref 65–99)
GLUCOSE BLD-MCNC: 292 MG/DL (ref 65–99)
GLUCOSE BLD-MCNC: 307 MG/DL (ref 65–99)

## 2017-07-01 PROCEDURE — 94760 N-INVAS EAR/PLS OXIMETRY 1: CPT

## 2017-07-01 PROCEDURE — A9270 NON-COVERED ITEM OR SERVICE: HCPCS | Performed by: INTERNAL MEDICINE

## 2017-07-01 PROCEDURE — 99233 SBSQ HOSP IP/OBS HIGH 50: CPT | Mod: GC | Performed by: INTERNAL MEDICINE

## 2017-07-01 PROCEDURE — 700102 HCHG RX REV CODE 250 W/ 637 OVERRIDE(OP): Performed by: INTERNAL MEDICINE

## 2017-07-01 PROCEDURE — 770006 HCHG ROOM/CARE - MED/SURG/GYN SEMI*

## 2017-07-01 PROCEDURE — A9270 NON-COVERED ITEM OR SERVICE: HCPCS | Performed by: STUDENT IN AN ORGANIZED HEALTH CARE EDUCATION/TRAINING PROGRAM

## 2017-07-01 PROCEDURE — 700111 HCHG RX REV CODE 636 W/ 250 OVERRIDE (IP): Performed by: INTERNAL MEDICINE

## 2017-07-01 PROCEDURE — 82962 GLUCOSE BLOOD TEST: CPT | Mod: 91

## 2017-07-01 PROCEDURE — 94640 AIRWAY INHALATION TREATMENT: CPT

## 2017-07-01 PROCEDURE — 700101 HCHG RX REV CODE 250: Performed by: INTERNAL MEDICINE

## 2017-07-01 PROCEDURE — 700102 HCHG RX REV CODE 250 W/ 637 OVERRIDE(OP): Performed by: STUDENT IN AN ORGANIZED HEALTH CARE EDUCATION/TRAINING PROGRAM

## 2017-07-01 RX ORDER — AMLODIPINE BESYLATE 10 MG/1
10 TABLET ORAL DAILY
Status: DISCONTINUED | OUTPATIENT
Start: 2017-07-02 | End: 2017-07-03 | Stop reason: HOSPADM

## 2017-07-01 RX ORDER — CARVEDILOL 3.12 MG/1
3.12 TABLET ORAL 2 TIMES DAILY WITH MEALS
Status: DISCONTINUED | OUTPATIENT
Start: 2017-07-01 | End: 2017-07-03 | Stop reason: HOSPADM

## 2017-07-01 RX ADMIN — GABAPENTIN 600 MG: 300 CAPSULE ORAL at 20:19

## 2017-07-01 RX ADMIN — STANDARDIZED SENNA CONCENTRATE AND DOCUSATE SODIUM 2 TABLET: 8.6; 5 TABLET, FILM COATED ORAL at 20:19

## 2017-07-01 RX ADMIN — IPRATROPIUM BROMIDE AND ALBUTEROL SULFATE 3 ML: .5; 3 SOLUTION RESPIRATORY (INHALATION) at 19:13

## 2017-07-01 RX ADMIN — MONTELUKAST SODIUM 10 MG: 10 TABLET, FILM COATED ORAL at 10:11

## 2017-07-01 RX ADMIN — PREDNISONE 40 MG: 20 TABLET ORAL at 10:11

## 2017-07-01 RX ADMIN — GABAPENTIN 600 MG: 300 CAPSULE ORAL at 15:15

## 2017-07-01 RX ADMIN — OXYCODONE HYDROCHLORIDE 10 MG: 10 TABLET ORAL at 12:47

## 2017-07-01 RX ADMIN — OXYCODONE HYDROCHLORIDE 10 MG: 10 TABLET ORAL at 17:30

## 2017-07-01 RX ADMIN — HEPARIN SODIUM 5000 UNITS: 5000 INJECTION, SOLUTION INTRAVENOUS; SUBCUTANEOUS at 20:18

## 2017-07-01 RX ADMIN — INSULIN LISPRO 6 UNITS: 100 INJECTION, SOLUTION INTRAVENOUS; SUBCUTANEOUS at 12:44

## 2017-07-01 RX ADMIN — DULOXETINE HYDROCHLORIDE 60 MG: 60 CAPSULE, DELAYED RELEASE ORAL at 10:10

## 2017-07-01 RX ADMIN — LEVOTHYROXINE SODIUM 75 MCG: 75 TABLET ORAL at 05:19

## 2017-07-01 RX ADMIN — HEPARIN SODIUM 5000 UNITS: 5000 INJECTION, SOLUTION INTRAVENOUS; SUBCUTANEOUS at 05:20

## 2017-07-01 RX ADMIN — GABAPENTIN 600 MG: 300 CAPSULE ORAL at 10:11

## 2017-07-01 RX ADMIN — CARVEDILOL 3.12 MG: 3.12 TABLET, FILM COATED ORAL at 17:26

## 2017-07-01 RX ADMIN — OXYCODONE HYDROCHLORIDE 10 MG: 10 TABLET ORAL at 22:14

## 2017-07-01 RX ADMIN — ACETAMINOPHEN 650 MG: 325 TABLET, FILM COATED ORAL at 10:11

## 2017-07-01 RX ADMIN — OXYCODONE HYDROCHLORIDE 10 MG: 10 TABLET ORAL at 03:01

## 2017-07-01 RX ADMIN — IPRATROPIUM BROMIDE AND ALBUTEROL SULFATE 3 ML: .5; 3 SOLUTION RESPIRATORY (INHALATION) at 10:29

## 2017-07-01 RX ADMIN — HEPARIN SODIUM 5000 UNITS: 5000 INJECTION, SOLUTION INTRAVENOUS; SUBCUTANEOUS at 15:15

## 2017-07-01 RX ADMIN — TRAZODONE HYDROCHLORIDE 300 MG: 150 TABLET ORAL at 20:19

## 2017-07-01 RX ADMIN — INSULIN GLARGINE 30 UNITS: 100 INJECTION, SOLUTION SUBCUTANEOUS at 05:30

## 2017-07-01 RX ADMIN — IPRATROPIUM BROMIDE AND ALBUTEROL SULFATE 3 ML: .5; 3 SOLUTION RESPIRATORY (INHALATION) at 06:51

## 2017-07-01 RX ADMIN — IPRATROPIUM BROMIDE AND ALBUTEROL SULFATE 3 ML: .5; 3 SOLUTION RESPIRATORY (INHALATION) at 15:13

## 2017-07-01 ASSESSMENT — PAIN SCALES - GENERAL
PAINLEVEL_OUTOF10: 7
PAINLEVEL_OUTOF10: 4
PAINLEVEL_OUTOF10: 6
PAINLEVEL_OUTOF10: 6
PAINLEVEL_OUTOF10: 2
PAINLEVEL_OUTOF10: 7
PAINLEVEL_OUTOF10: 7

## 2017-07-01 ASSESSMENT — ENCOUNTER SYMPTOMS
WHEEZING: 1
BRUISES/BLEEDS EASILY: 1
CHILLS: 0
HEADACHES: 0
WEIGHT LOSS: 0
NAUSEA: 0
BLURRED VISION: 0
HEARTBURN: 0
FEVER: 0

## 2017-07-01 NOTE — CARE PLAN
Problem: Safety  Goal: Will remain free from injury  Outcome: PROGRESSING AS EXPECTED  Bed alarm on and in use, hourly rounding in place, call light within reach.    Problem: Respiratory:  Goal: Respiratory status will improve  Outcome: PROGRESSING AS EXPECTED  Pt transferred from ICU where she was vented, now on baseline O2, RT involved in care, pt using IS effectively.

## 2017-07-01 NOTE — CARE PLAN
Problem: Oxygenation:  Goal: Maintain adequate oxygenation dependent on patient condition  Outcome: PROGRESSING AS EXPECTED  Patient is on 4 L nasal cannula    Problem: Bronchoconstriction:  Goal: Improve in air movement and diminished wheezing  Outcome: PROGRESSING AS EXPECTED  Patient taking Duo Neb Q4 ,

## 2017-07-01 NOTE — PROGRESS NOTES
This RN paged HORACE Carrasquillo MD, let MD know that pt's most recent BP is 95/55. Received orders from MD to hold AM dose of Amlodipine and Carvedilol, also to hold AM dose of Lasix and recheck pt's BP before next Lasix dose (If SBP > 100 okay to give Lasix), MD states to give patient PRN Tylenol instead of PRN Oxy at this time for pain.

## 2017-07-01 NOTE — RESPIRATORY CARE
COPD EDUCATION by COPD CLINICAL EDUCATOR  7/1/2017 at 6:16 AM by Aida Vega     Patient reviewed by COPD education team. Patient does not qualify for COPD program.

## 2017-07-01 NOTE — ASSESSMENT & PLAN NOTE
*admit(  smoke .,  incr SOB, recent HAP admit 2 weeks PTA, wheezing, acc muscle use, BIPAP --> intubated in ER/airway control, ventilatory faiure, pcR: mid/lower zone infiltrates , bronch: neg cytology, old hemorrhage  Impression: acute/chronic infiltrative O2/CO2 resp failure,  COPD (home O2) , infiltative resp failure infectous vs inflammatory infiltrates    June 5: CTPE: no PE, pathcy nodular infitlates, bilaterally R>L, ground glass, LL atelectasis, small effusions, mediastinal and hilar andenoapthy

## 2017-07-01 NOTE — PROGRESS NOTES
Pt Priya Callahan  admitted to room S629-1 via transport in wheelchair from [facility] at 2015.  Pt is A&Ox4.  VSS.  C/O pain, generalized but was recently given pain meds before she was transfered.   One assist up to BSC.  Calls appropriately.  Oriented to room call light and smoking policy.  Reviewed plan of care  th patient and family.  Call light within reach and working properly.

## 2017-07-01 NOTE — ASSESSMENT & PLAN NOTE
Impresson: likely Hashimotos thyroidits, on replacement  Plan: LT4    7/1 sent for tsh and free t4

## 2017-07-01 NOTE — PROGRESS NOTES
Patient A&O x 4, complains of generalized pain - PRN Tylenol and heat pack given per MAR, generalized bruising present, pt 1-assist to use bedside commode, RT and SLP involved in patient's care.

## 2017-07-01 NOTE — PROGRESS NOTES
2 RN skin assessment. Bruising generalized on BUE, BLE, abdomen, and right buttocks.  Buttocks and groin redness d/t IAD, blanching.

## 2017-07-01 NOTE — PROGRESS NOTES
Pt transported via wheelchair and 5L NC oxygen by transport team to S629/01 . Patient left with all belongings, chart and meds. Reported called from Liana REBOLLAR Day shift RN to night shift RN at 1930.

## 2017-07-02 LAB
ALBUMIN SERPL BCP-MCNC: 3.2 G/DL (ref 3.2–4.9)
ALBUMIN/GLOB SERPL: 1 G/DL
ALP SERPL-CCNC: 59 U/L (ref 30–99)
ALT SERPL-CCNC: 79 U/L (ref 2–50)
ANION GAP SERPL CALC-SCNC: 9 MMOL/L (ref 0–11.9)
AST SERPL-CCNC: 31 U/L (ref 12–45)
BASOPHILS # BLD AUTO: 0.1 % (ref 0–1.8)
BASOPHILS # BLD: 0.02 K/UL (ref 0–0.12)
BILIRUB SERPL-MCNC: 0.6 MG/DL (ref 0.1–1.5)
BNP SERPL-MCNC: 153 PG/ML (ref 0–100)
BUN SERPL-MCNC: 75 MG/DL (ref 8–22)
CALCIUM SERPL-MCNC: 9 MG/DL (ref 8.5–10.5)
CHLORIDE SERPL-SCNC: 94 MMOL/L (ref 96–112)
CO2 SERPL-SCNC: 34 MMOL/L (ref 20–33)
CREAT SERPL-MCNC: 1.31 MG/DL (ref 0.5–1.4)
EOSINOPHIL # BLD AUTO: 0.07 K/UL (ref 0–0.51)
EOSINOPHIL NFR BLD: 0.5 % (ref 0–6.9)
ERYTHROCYTE [DISTWIDTH] IN BLOOD BY AUTOMATED COUNT: 56.8 FL (ref 35.9–50)
GFR SERPL CREATININE-BSD FRML MDRD: 40 ML/MIN/1.73 M 2
GLOBULIN SER CALC-MCNC: 3.2 G/DL (ref 1.9–3.5)
GLUCOSE BLD-MCNC: 201 MG/DL (ref 65–99)
GLUCOSE BLD-MCNC: 380 MG/DL (ref 65–99)
GLUCOSE SERPL-MCNC: 302 MG/DL (ref 65–99)
HCT VFR BLD AUTO: 31.2 % (ref 37–47)
HGB BLD-MCNC: 9.8 G/DL (ref 12–16)
IMM GRANULOCYTES # BLD AUTO: 0.19 K/UL (ref 0–0.11)
IMM GRANULOCYTES NFR BLD AUTO: 1.3 % (ref 0–0.9)
LYMPHOCYTES # BLD AUTO: 0.99 K/UL (ref 1–4.8)
LYMPHOCYTES NFR BLD: 7 % (ref 22–41)
MAGNESIUM SERPL-MCNC: 2.4 MG/DL (ref 1.5–2.5)
MCH RBC QN AUTO: 29.2 PG (ref 27–33)
MCHC RBC AUTO-ENTMCNC: 31.4 G/DL (ref 33.6–35)
MCV RBC AUTO: 92.9 FL (ref 81.4–97.8)
MONOCYTES # BLD AUTO: 0.7 K/UL (ref 0–0.85)
MONOCYTES NFR BLD AUTO: 4.9 % (ref 0–13.4)
NEUTROPHILS # BLD AUTO: 12.24 K/UL (ref 2–7.15)
NEUTROPHILS NFR BLD: 86.2 % (ref 44–72)
NRBC # BLD AUTO: 0 K/UL
NRBC BLD AUTO-RTO: 0 /100 WBC
PLATELET # BLD AUTO: 249 K/UL (ref 164–446)
PMV BLD AUTO: 9.9 FL (ref 9–12.9)
POTASSIUM SERPL-SCNC: 4 MMOL/L (ref 3.6–5.5)
PROT SERPL-MCNC: 6.4 G/DL (ref 6–8.2)
RBC # BLD AUTO: 3.36 M/UL (ref 4.2–5.4)
SODIUM SERPL-SCNC: 137 MMOL/L (ref 135–145)
T4 FREE SERPL-MCNC: 1.05 NG/DL (ref 0.53–1.43)
TSH SERPL DL<=0.005 MIU/L-ACNC: 1.35 UIU/ML (ref 0.3–3.7)
WBC # BLD AUTO: 14.2 K/UL (ref 4.8–10.8)

## 2017-07-02 PROCEDURE — 700102 HCHG RX REV CODE 250 W/ 637 OVERRIDE(OP): Performed by: STUDENT IN AN ORGANIZED HEALTH CARE EDUCATION/TRAINING PROGRAM

## 2017-07-02 PROCEDURE — 82962 GLUCOSE BLOOD TEST: CPT

## 2017-07-02 PROCEDURE — 85025 COMPLETE CBC W/AUTO DIFF WBC: CPT

## 2017-07-02 PROCEDURE — 36415 COLL VENOUS BLD VENIPUNCTURE: CPT

## 2017-07-02 PROCEDURE — 84439 ASSAY OF FREE THYROXINE: CPT

## 2017-07-02 PROCEDURE — A9270 NON-COVERED ITEM OR SERVICE: HCPCS | Performed by: INTERNAL MEDICINE

## 2017-07-02 PROCEDURE — 700111 HCHG RX REV CODE 636 W/ 250 OVERRIDE (IP): Performed by: INTERNAL MEDICINE

## 2017-07-02 PROCEDURE — 770006 HCHG ROOM/CARE - MED/SURG/GYN SEMI*

## 2017-07-02 PROCEDURE — A9270 NON-COVERED ITEM OR SERVICE: HCPCS | Performed by: STUDENT IN AN ORGANIZED HEALTH CARE EDUCATION/TRAINING PROGRAM

## 2017-07-02 PROCEDURE — 83880 ASSAY OF NATRIURETIC PEPTIDE: CPT

## 2017-07-02 PROCEDURE — 700111 HCHG RX REV CODE 636 W/ 250 OVERRIDE (IP): Performed by: STUDENT IN AN ORGANIZED HEALTH CARE EDUCATION/TRAINING PROGRAM

## 2017-07-02 PROCEDURE — 700102 HCHG RX REV CODE 250 W/ 637 OVERRIDE(OP): Performed by: INTERNAL MEDICINE

## 2017-07-02 PROCEDURE — 83735 ASSAY OF MAGNESIUM: CPT

## 2017-07-02 PROCEDURE — 99232 SBSQ HOSP IP/OBS MODERATE 35: CPT | Mod: GC | Performed by: INTERNAL MEDICINE

## 2017-07-02 PROCEDURE — 80053 COMPREHEN METABOLIC PANEL: CPT

## 2017-07-02 PROCEDURE — 84443 ASSAY THYROID STIM HORMONE: CPT

## 2017-07-02 RX ORDER — OMEPRAZOLE 20 MG/1
20 CAPSULE, DELAYED RELEASE ORAL DAILY
Status: DISCONTINUED | OUTPATIENT
Start: 2017-07-02 | End: 2017-07-02

## 2017-07-02 RX ADMIN — HEPARIN SODIUM 5000 UNITS: 5000 INJECTION, SOLUTION INTRAVENOUS; SUBCUTANEOUS at 06:02

## 2017-07-02 RX ADMIN — AMLODIPINE BESYLATE 10 MG: 10 TABLET ORAL at 08:06

## 2017-07-02 RX ADMIN — PREDNISONE 40 MG: 20 TABLET ORAL at 08:05

## 2017-07-02 RX ADMIN — TRAZODONE HYDROCHLORIDE 300 MG: 150 TABLET ORAL at 20:41

## 2017-07-02 RX ADMIN — OXYCODONE HYDROCHLORIDE 10 MG: 10 TABLET ORAL at 20:40

## 2017-07-02 RX ADMIN — MONTELUKAST SODIUM 10 MG: 10 TABLET, FILM COATED ORAL at 08:06

## 2017-07-02 RX ADMIN — CARVEDILOL 3.12 MG: 3.12 TABLET, FILM COATED ORAL at 08:05

## 2017-07-02 RX ADMIN — GABAPENTIN 600 MG: 300 CAPSULE ORAL at 15:48

## 2017-07-02 RX ADMIN — LEVOTHYROXINE SODIUM 75 MCG: 75 TABLET ORAL at 06:02

## 2017-07-02 RX ADMIN — GABAPENTIN 600 MG: 300 CAPSULE ORAL at 08:05

## 2017-07-02 RX ADMIN — DULOXETINE HYDROCHLORIDE 60 MG: 60 CAPSULE, DELAYED RELEASE ORAL at 08:06

## 2017-07-02 RX ADMIN — OXYCODONE HYDROCHLORIDE 10 MG: 10 TABLET ORAL at 04:35

## 2017-07-02 RX ADMIN — INSULIN GLARGINE 30 UNITS: 100 INJECTION, SOLUTION SUBCUTANEOUS at 06:02

## 2017-07-02 RX ADMIN — CARVEDILOL 3.12 MG: 3.12 TABLET, FILM COATED ORAL at 17:55

## 2017-07-02 RX ADMIN — GABAPENTIN 600 MG: 300 CAPSULE ORAL at 20:41

## 2017-07-02 RX ADMIN — OXYCODONE HYDROCHLORIDE 10 MG: 10 TABLET ORAL at 15:48

## 2017-07-02 ASSESSMENT — ENCOUNTER SYMPTOMS
WEIGHT LOSS: 0
FEVER: 0
HEARTBURN: 0
WHEEZING: 1
HEADACHES: 0
CHILLS: 0
BRUISES/BLEEDS EASILY: 1
BLURRED VISION: 0
NAUSEA: 0

## 2017-07-02 ASSESSMENT — PAIN SCALES - GENERAL
PAINLEVEL_OUTOF10: 3
PAINLEVEL_OUTOF10: 2

## 2017-07-02 NOTE — PROGRESS NOTES
[facility] Interval Note    Name Priya Callahan     1950   Age/Sex 66 y.o. female   MRN [id number]   Code Status FULL     After 5PM or if no immediate response to page, please call for cross-coverage  Attending/Team: DR. ALY/ CRYSTAL Use Tiger Text to page  6AM-5PM  Call [phone] to page afterhours   1st Call - Day Intern (R1):   PORTIA 2nd Call - Day Sr. Resident (R2/R3):   DR. ERICKSON         Chief complaint/ reason for interval visit (Primary Diagnosis)   67y/o female with a PMH of chronic hypoxemic respiratory failure and COPD came in with severe sob secondary to copd exacerbation and acute respiratory failure requiring intubation.    Interval Problem Daily Status Update    D/C lasix - didn't want to fluid overload the patient, repeat BNP  On 3-4 lts of oxygen, monitor for hypoxia  Encourage incentive spirometry to improve lung function.  PT evaluation consult       Review of Systems   Constitutional: Negative for fever, chills, weight loss and malaise/fatigue.   Eyes: Negative for blurred vision.   Respiratory: Positive for wheezing.    Cardiovascular: Negative for chest pain.   Gastrointestinal: Negative for heartburn and nausea.   Neurological: Negative for headaches.   Endo/Heme/Allergies: Bruises/bleeds easily.       Quality Measures  Labs reviewed and Medications reviewed  Mckeon catheter: No Mckeon      DVT Prophylaxis: Heparin                  Physical Exam       Filed Vitals:    17 1200 17 1246 17 1516 17 1600   BP: 121/56 107/63  126/45   Pulse: 63 60 79 82   Temp: 36.3 °C (97.4 °F)   36.2 °C (97.2 °F)   Resp: 20  18 20   Height:       Weight:       SpO2: 96%  97% 93%     Body mass index is 37.72 kg/(m^2).    Oxygen Therapy:  Pulse Oximetry: 93 %, O2 (LPM): 4, O2 Delivery: Nasal Cannula    Physical Exam   Constitutional: She is oriented to person, place, and time and well-developed, well-nourished, and in no distress.    HENT:   Head: Normocephalic and atraumatic.   Eyes: Pupils are equal, round, and reactive to light.   Cardiovascular: Normal rate, regular rhythm and normal heart sounds.    Pulmonary/Chest: She has wheezes.   Abdominal: Soft.   Neurological: She is alert and oriented to person, place, and time.         Lab Data Review:      7/1/2017  6:30 PM    Recent Labs      06/29/17   0437  06/30/17   0847   SODIUM  146*  141  142   POTASSIUM  3.5*  3.6  3.7   CHLORIDE  109  100  99   CO2  30  30  32   BUN  77*  82*  82*   CREATININE  1.10  1.21  1.21   PHOSPHORUS   --   3.8   CALCIUM  9.1  8.9  9.0       Recent Labs      06/29/17   0437  06/30/17   0847   GLUCOSE  135*  254*  253*       Recent Labs      06/30/17   0847   RBC  3.26*   HEMOGLOBIN  9.5*   HEMATOCRIT  30.7*   PLATELETCT  238       Recent Labs      06/30/17   0847   WBC  17.4*   NEUTSPOLYS  76.60*   LYMPHOCYTES  12.40*   MONOCYTES  5.70   EOSINOPHILS  3.50   BASOPHILS  0.10           Assessment/Plan     * Acute respiratory failure requiring reintubation (CMS-[facility])  (COPD exacerbation, infiltrates due to infectious vs inflammatory pneumonia?)   Assessment & Plan  *admit(  smoke .,  incr SOB, recent HAP admit 2 weeks PTA, wheezing, acc muscle use, BIPAP --> intubated in ER/airway control, ventilatory faiure, pcR: mid/lower zone infiltrates , bronch: neg cytology, old hemorrhage  Impression: acute/chronic infiltrative O2/CO2 resp failure,  COPD (home O2) , infiltative resp failure infectous vs inflammatory infiltrates    June 5: CTPE: no PE, pathcy nodular infitlates, bilaterally R>L, ground glass, LL atelectasis, small effusions, mediastinal and hilar andenoapthy      Severe sepsis (CMS-HCC) (Pneumonia, acute respiratory failure)   Assessment & Plan  Resolved.  Stopped lasix for not wanting fluid overload on the patient.  Repeat BNP sent.  To encourage incentive spirometry  Look for hypoxia . Currently on 3-4 lts oxygen    Chronic pain syndrome (present  on admission)  Assessment & Plan  Impression: chronic pain (neuropathy, fibromyalgia)      Thyroiditis  Assessment & Plan  Impresson: likely Hashimotos thyroidits, on replacement  Plan: LT4    7/1 sent for tsh and free t4

## 2017-07-02 NOTE — PROGRESS NOTES
Pt asleep at the moment on 4L nc, pain meds given prn, fall bundle in place, call light within reach. Bed in low and lock position, bed alarm on. Will continue to monitor pt.

## 2017-07-02 NOTE — PROGRESS NOTES
Transferred from intensive care unit after treatment for acute respiratory failure associated with an exacerbation of chronic obstructive pulmonary disease.  At this time she is alert and comfortable without dyspnea on low flow oxygen nasal cannula and out of bed to chair.  Her cough is not regularly productive and she has no chest pain.    On examination, she is afebrile with stable blood pressure and heart rate.  On chest examination there are diminished but symmetrical bilateral breath sounds without rales, wheezing or consolidation.  The abdomen is soft without organomegaly or tenderness.  Extremities show no clubbing, cyanosis or edema and no signs of acute deep venous thrombosis.    Assessment:  1.  Acute and chronic hypoxemic respiratory failure, improved and now on low flow oxygen.  2.  Chronic obstructive pulmonary disease with acute exacerbation.  3. Recent pneumonia, afebrile after a course of antibiotics.    Recommendations:  Continue titrated oxygen, bronchodilators, respiratory protocols and the tapering course of corticosteroids.  Continue mobilization and prophylaxis.  We will sign off to the hospitalist medical team.  Please call as needed.

## 2017-07-02 NOTE — PROGRESS NOTES
This RN paged [facility] MD, pt's sliding scale was discontinued and her last blood sugar was above 300. ALBERTOR MD states that sliding scale insulin will not be implemented until >400. MD states she will put in nursing communication to call MD if sugar is >400, continue checking sugars Q6.  1730: blood sugar 292

## 2017-07-02 NOTE — ASSESSMENT & PLAN NOTE
Resolved.  Stopped lasix for not wanting fluid overload on the patient.  Repeat BNP sent.  To encourage incentive spirometry  Look for hypoxia . Currently on 3-4 lts oxygen

## 2017-07-03 VITALS
BODY MASS INDEX: 33.02 KG/M2 | RESPIRATION RATE: 18 BRPM | WEIGHT: 198.19 LBS | DIASTOLIC BLOOD PRESSURE: 40 MMHG | OXYGEN SATURATION: 98 % | HEART RATE: 75 BPM | HEIGHT: 65 IN | TEMPERATURE: 97.8 F | SYSTOLIC BLOOD PRESSURE: 122 MMHG

## 2017-07-03 LAB
ALBUMIN SERPL BCP-MCNC: 2.8 G/DL (ref 3.2–4.9)
ALBUMIN/GLOB SERPL: 1.1 G/DL
ALP SERPL-CCNC: 57 U/L (ref 30–99)
ALT SERPL-CCNC: 86 U/L (ref 2–50)
ANION GAP SERPL CALC-SCNC: 3 MMOL/L (ref 0–11.9)
AST SERPL-CCNC: 34 U/L (ref 12–45)
BILIRUB SERPL-MCNC: 0.5 MG/DL (ref 0.1–1.5)
BUN SERPL-MCNC: 68 MG/DL (ref 8–22)
CALCIUM SERPL-MCNC: 8.5 MG/DL (ref 8.5–10.5)
CHLORIDE SERPL-SCNC: 98 MMOL/L (ref 96–112)
CO2 SERPL-SCNC: 37 MMOL/L (ref 20–33)
CREAT SERPL-MCNC: 1.34 MG/DL (ref 0.5–1.4)
CRP SERPL HS-MCNC: 2.53 MG/DL (ref 0–0.75)
GFR SERPL CREATININE-BSD FRML MDRD: 39 ML/MIN/1.73 M 2
GLOBULIN SER CALC-MCNC: 2.6 G/DL (ref 1.9–3.5)
GLUCOSE BLD-MCNC: 127 MG/DL (ref 65–99)
GLUCOSE SERPL-MCNC: 195 MG/DL (ref 65–99)
POTASSIUM SERPL-SCNC: 4 MMOL/L (ref 3.6–5.5)
PREALB SERPL-MCNC: 29 MG/DL (ref 18–38)
PROT SERPL-MCNC: 5.4 G/DL (ref 6–8.2)
SODIUM SERPL-SCNC: 138 MMOL/L (ref 135–145)

## 2017-07-03 PROCEDURE — 99239 HOSP IP/OBS DSCHRG MGMT >30: CPT | Mod: GC | Performed by: INTERNAL MEDICINE

## 2017-07-03 PROCEDURE — 700102 HCHG RX REV CODE 250 W/ 637 OVERRIDE(OP): Performed by: INTERNAL MEDICINE

## 2017-07-03 PROCEDURE — 92612 ENDOSCOPY SWALLOW (FEES) VID: CPT

## 2017-07-03 PROCEDURE — A9270 NON-COVERED ITEM OR SERVICE: HCPCS | Performed by: INTERNAL MEDICINE

## 2017-07-03 PROCEDURE — 80053 COMPREHEN METABOLIC PANEL: CPT

## 2017-07-03 PROCEDURE — G8996 SWALLOW CURRENT STATUS: HCPCS | Mod: CJ

## 2017-07-03 PROCEDURE — 86140 C-REACTIVE PROTEIN: CPT

## 2017-07-03 PROCEDURE — 700111 HCHG RX REV CODE 636 W/ 250 OVERRIDE (IP): Performed by: STUDENT IN AN ORGANIZED HEALTH CARE EDUCATION/TRAINING PROGRAM

## 2017-07-03 PROCEDURE — 700102 HCHG RX REV CODE 250 W/ 637 OVERRIDE(OP): Performed by: STUDENT IN AN ORGANIZED HEALTH CARE EDUCATION/TRAINING PROGRAM

## 2017-07-03 PROCEDURE — 84134 ASSAY OF PREALBUMIN: CPT

## 2017-07-03 PROCEDURE — G8997 SWALLOW GOAL STATUS: HCPCS | Mod: CI

## 2017-07-03 PROCEDURE — A9270 NON-COVERED ITEM OR SERVICE: HCPCS | Performed by: STUDENT IN AN ORGANIZED HEALTH CARE EDUCATION/TRAINING PROGRAM

## 2017-07-03 PROCEDURE — 36415 COLL VENOUS BLD VENIPUNCTURE: CPT

## 2017-07-03 PROCEDURE — 92526 ORAL FUNCTION THERAPY: CPT

## 2017-07-03 PROCEDURE — 82962 GLUCOSE BLOOD TEST: CPT

## 2017-07-03 RX ORDER — PREDNISONE 20 MG/1
40 TABLET ORAL DAILY
Qty: 15 TAB | Refills: 0 | Status: SHIPPED | OUTPATIENT
Start: 2017-07-03 | End: 2017-09-28

## 2017-07-03 RX ADMIN — OXYCODONE HYDROCHLORIDE 10 MG: 10 TABLET ORAL at 08:57

## 2017-07-03 RX ADMIN — INSULIN GLARGINE 30 UNITS: 100 INJECTION, SOLUTION SUBCUTANEOUS at 06:22

## 2017-07-03 RX ADMIN — GABAPENTIN 600 MG: 300 CAPSULE ORAL at 08:48

## 2017-07-03 RX ADMIN — MONTELUKAST SODIUM 10 MG: 10 TABLET, FILM COATED ORAL at 08:49

## 2017-07-03 RX ADMIN — AMLODIPINE BESYLATE 10 MG: 10 TABLET ORAL at 08:49

## 2017-07-03 RX ADMIN — DULOXETINE HYDROCHLORIDE 60 MG: 60 CAPSULE, DELAYED RELEASE ORAL at 08:48

## 2017-07-03 RX ADMIN — LEVOTHYROXINE SODIUM 75 MCG: 75 TABLET ORAL at 06:22

## 2017-07-03 RX ADMIN — PREDNISONE 40 MG: 20 TABLET ORAL at 08:48

## 2017-07-03 RX ADMIN — CARVEDILOL 3.12 MG: 3.12 TABLET, FILM COATED ORAL at 08:48

## 2017-07-03 ASSESSMENT — PAIN SCALES - GENERAL
PAINLEVEL_OUTOF10: 0
PAINLEVEL_OUTOF10: 5
PAINLEVEL_OUTOF10: 0
PAINLEVEL_OUTOF10: 4
PAINLEVEL_OUTOF10: 0

## 2017-07-03 NOTE — CARE PLAN
Problem: Bowel/Gastric:  Goal: Normal bowel function is maintained or improved  Outcome: PROGRESSING AS EXPECTED  Declines scheduled Senna po this am. Had a BM this am.    07/03/17 1000   OTHER   Last BM 07/03/17   Number of Times Stooled 1         Problem: Pain Management  Goal: Pain level will decrease to patient’s comfort goal  Outcome: PROGRESSING AS EXPECTED  Pt medicated for pain control today. Received 10mg Oxycodone po by request this am.

## 2017-07-03 NOTE — DISCHARGE PLANNING
Care Transition Team Assessment    IHD met with patient bedside. She stated she lives alone, but has friends who will check on her. She normally drives herself, but her friends will come pick her up after the discharge. She stated she has a walker, wheelchair, and cane at home, and expects that she'll be using the wheelchair the most when she goes home. She previously had [facility] and expects that to resume after discharge. She stated she uses O2 at home, but wasn't sure who her provider was for that.     Information Source  Orientation : Oriented x 4  Information Given By: Patient  Informant's Name: Priya  Who is responsible for making decisions for patient? : Patient    Readmission Evaluation  Is this a readmission?: Yes - unplanned readmission  Why do you think you were readmitted?: Short of Breath  Was an appointment arranged for you prior to discharge?: No  Were there new prescriptions you were supposed to fill after you were discharged?: Yes, prescriptions filled  Did you understand your discharge instructions?: Yes  Did you have enough support after your last discharge?: Yes    Elopement Risk  Legal Hold: No  Ambulatory or Self Mobile in Wheelchair: No-Not an Elopement Risk  Elopement Risk: Not at Risk for Elopement    Interdisciplinary Discharge Planning  Lives with - Patient's Self Care Capacity: Attendant / Paid Care Giver  Housing / Facility: Skilled Nursing Facility, [address] [employer] [address]    Discharge Preparedness  What is your plan after discharge?: Home health care  What are your discharge supports?: Sibling, Other (comment) (Neighbors)  Prior Functional Level: Ambulatory, Drives Self, Independent with Activities of Daily Living, Independent with Medication Management, Uses Walker, Uses Cane  Difficulity with ADLs: Walking  Difficulty with ADLs Comment: Uses walker, wheelchair, cane etc.   Difficulity with IADLs: None    Functional Assesment  Prior Functional Level: Ambulatory, Drives Self,  Independent with Activities of Daily Living, Independent with Medication Management, Uses Walker, Uses Cane    Finances  Financial Barriers to Discharge: No  Prescription Coverage: Yes (Fredo Avila)    Vision / Hearing Impairment  Right Eye Vision: Impaired, Wears Glasses  Left Eye Vision: Impaired, Wears Glasses              Domestic Abuse  Have you ever been the victim of abuse or violence?: No    Psychological Assessment  History of Substance Abuse: None  History of Psychiatric Problems: No  Non-compliant with Treatment: No  Newly Diagnosed Illness: No    Discharge Risks or Barriers  Discharge risks or barriers?: No PCP  Patient risk factors: Readmission    Anticipated Discharge Information  Anticipated discharge disposition: Discharge needs currently unknown  Discharge Address: [address] [place] [address]  Discharge Contact Phone Number: [phone]

## 2017-07-03 NOTE — THERAPY
"Speech Language Therapy dysphagia treatment completed.   Functional Status:  Patient seen for dysphagia therapy on this date.  She was pleasant and stated, \"I'm going home today.\"  Patient reported she had been completing dysphagia exercises independently.  Presentation of PO included nectars, soft solids, and thin liquids.  The patient presented with s/sx concerning for penetration/aspiration with thin liquids as seen by reflexive throat clearing or coughing following >80% of sips.  She had no overt s/sx of aspiration with nectars or soft solids.  The patient completed 10/10 reps of Gricelda, tongue base retraction, effortful swallow, and CTAR, given mod verbal cues but with \"good\" accuracy.  [facility] team at bedside and patient may discharge home today to [place], where she lives alone.    Recommendations: Continue Dysphagia 2/NTL as tolerated.  Received order for FEES to further assess pharyngeal swallowing function prior to d/c today.     Plan of Care: Will benefit from Speech Therapy 3 times per week  Post-Acute Therapy: Discharge to a transitional care facility for continued skilled therapy services vs Discharge to home with outpatient or home health for additional skilled therapy services.    See \"Rehab Therapy-Acute\" Patient Summary Report for complete documentation.     "

## 2017-07-03 NOTE — PROGRESS NOTES
Pt up to bedside commode with sba   Wears boot to L foot during day when up   A/o   Rings call light appropriately   Refusing bed alarm   Discussed safety precautions with pt   Planning to d/c home in am   Uneventful shift    Will monitor

## 2017-07-03 NOTE — DISCHARGE PLANNING
Received choice form from Higinio(MOHAMUD). Referral sent to [facility]. Laverne(JAY JAY) to follow.

## 2017-07-03 NOTE — DISCHARGE INSTRUCTIONS
Discharge Instructions    Discharged to home by car with relative. Discharged via wheelchair, hospital escort: Yes.  Special equipment needed: Not Applicable    Be sure to schedule a follow-up appointment with your primary care doctor or any specialists as instructed.     Amy [employer] acquired by [employer] is being reordered for you by [facility] physicians - [facility] health number:     Discharge Plan:   Diet Plan: Discussed - dysphagia 2 diet NECTAR THICK LIQUIDS  Activity Level: Discussed  Confirmed Follow up Appointment: Patient to Call and Schedule Appointment - Some appointments scheduled, see below  Confirmed Symptoms Management: Discussed  Medication Reconciliation Updated: Yes  Influenza Vaccine Indication: Not indicated: Previously immunized this influenza season and > 8 years of age    I understand that a diet low in cholesterol, fat, and sodium is recommended for good health. Unless I have been given specific instructions below for another diet, I accept this instruction as my diet prescription.   Other diet: dysphagia 2 diet NECTAR THICK LIQUIDS      · Is patient discharged on Warfarin / Coumadin?   No     · Is patient Post Blood Transfusion?  No    Depression / Suicide Risk    As you are discharged from this [facility] facility, it is important to learn how to keep safe from harming yourself.    Recognize the warning signs:  · Abrupt changes in personality, positive or negative- including increase in energy   · Giving away possessions  · Change in eating patterns- significant weight changes-  positive or negative  · Change in sleeping patterns- unable to sleep or sleeping all the time   · Unwillingness or inability to communicate  · Depression  · Unusual sadness, discouragement and loneliness  · Talk of wanting to die  · Neglect of personal appearance   · Rebelliousness- reckless behavior  · Withdrawal from people/activities they love  · Confusion- inability to concentrate     If you or a loved one observes any of  these behaviors or has concerns about self-harm, here's what you can do:  · Talk about it- your feelings and reasons for harming yourself  · Remove any means that you might use to hurt yourself (examples: pills, rope, extension cords, firearm)  · Get professional help from the community (Mental Health, Substance Abuse, psychological counseling)  · Do not be alone:Call your Safe Contact- someone whom you trust who will be there for you.  · Call your local CRISIS HOTLINE 042-2317 or 364-457-5485  · Call your local Children's Mobile Crisis Response Team [place] (880) 115-5706 or www.CustEx  · Call the toll free National Suicide Prevention Hotlines   · National Suicide Prevention Lifeline 030-768-UIJW (2784)  · National Hope Line Network 800-SUICIDE (867-5297)

## 2017-07-03 NOTE — THERAPY
Speech Language Therapy FEES completed.  Functional Status:Upon insertion of the scope, the left vocal cord was noted to have reduced mobility, though complete vocal cord adduction was achieved during cough and breath hold.  White patches noted on the glottal side of the epiglottis. The patient presented with mild-moderate pharyngeal dysphagia as seen by reduced sensation, reduced tongue base retraction, weak pharyngeal squeeze, and weak laryngeal elevation.  Subsequent premature spillage was noted to the valleculae and pyriform sinuses with all consistencies as well as mild-mod diffuse pharyngeal residue.  The patient had PENETRATION before the swallow to the back side of the epiglottis with nectars, thin liquids, and pudding; and suspected during the swallow with thins as seen by blue at the anterior commissure.  Penetration was seen after the swallow with soft solids (peach), however, a strong reflexive cough cleared the peach from the laryngeal vestibule.  TRICKLE ASPIRATION to the interarytenoid space was seen with thin liquids after the swallow and also following an episode of back flowed thin liquids.  She had inconsistent cough response to penetration and absent response to aspiration.  A chin tuck reduced, but did not eliminate, pharyngeal residue.  At this time, recommend the patient continue Dysphagia 2/nectars.  Provided extensive education to s/sx of aspiration, aspiration PNA, swallowing strategies, current diet recommendations, and how to thicken liquids.  SLP following during acute care.        Recommendations - Diet: Diet / Liquid Recommendation: Nectar Thick Liquid, Dysphagia II                          Strategies: No Straws and Head of Bed at 90 Degrees                          Medication Administration: Medication Administration : Via Gastric Tube  Plan of Care: Will benefit from Speech Therapy 3 times per week  Post-Acute Therapy: Discharge to a transitional care facility for continued skilled  "therapy services vs Discharge to home with outpatient or home health for additional skilled therapy services.    See \"Rehab Therapy-Acute\" Patient Summary Report for complete documentation.   "

## 2017-07-03 NOTE — PROGRESS NOTES
"Discussed pt's care with [facility]. Discussed pt's care with SW. Asked resident to re-order pt's home health. Pt states people will be with her 24 hrs a day \"for a while after discharge.\" Discharge instructions given to patient at bedside, verbalizes understanding and states plans for follow-up. Encouraged pt to change her follow up appt with PCP and see physician within 2 wks at the most. Pt educated on dysphagia 2 diet and nectar thick liquids, pt states she is knowledgeable, given a can of thickener. Pt anxious to leave, no c/o. IV cathlon removed. All belongings accounted for, all questions answered at this time. Pt taken downstairs via WC by CNA. Pt helped into friends car, own home O2 in use. Pt denies c/o, further needs.   "

## 2017-07-03 NOTE — FACE TO FACE
Face to Face Supporting Documentation - Home Health    The encounter with this patient was in whole or in part the primary reason for home health admission.    Date of encounter:   Patient:                    MRN:                       YOB: 2018  Priya Callahan  [id number]  1950     Home health to see patient for:  Physical Therapy evaluation and treatment    Skilled need for:  Exacerbation of Chronic Disease State copd    Skilled nursing interventions to include:  Comment: Chronic Debility/Help with Ambulation/Severe COPD    Homebound status evidenced by:  Need the aid of supportive devices such as crutches, canes, wheelchairs or walkers. Leaving home requires a considerable and taxing effort. There is a normal inability to leave the home.    Community Physician to provide follow up care: Pcp Pt States None     Optional Interventions?      I certify the face to face encounter for this home health care referral meets the CMS requirements and the encounter/clinical assessment with the patient was, in whole, or in part, for the medical condition(s) listed above, which is the primary reason for home health care. Based on my clinical findings: the service(s) are medically necessary, support the need for home health care, and the homebound criteria are met.  I certify that this patient has had a face to face encounter by myself.  Marisa Weeks M.D. - NPI: [id number]

## 2017-07-03 NOTE — PROGRESS NOTES
[facility] Interval Note    Name Priya Callahan     1950   Age/Sex 66 y.o. female   MRN [id number]   Code Status FULL     After 5PM or if no immediate response to page, please call for cross-coverage  Attending/Team: DR. ALY/ CRYSTAL Use Tiger Text to page  6AM-5PM  Call [phone] to page afterhours   1st Call - Day Intern (R1):   PORTIA 2nd Call - Day Sr. Resident (R2/R3):   DR. ERICKSON         Chief complaint/ reason for interval visit (Primary Diagnosis) Acute and chronic respiratory failure     Interval Problem Daily Status Update    Infiltrative  Acute/chronic hypoxemic resp failure: patient at baseline on current regimen, 3-4L , infiltrates resolving  , WBC down (steroids)   Mild HAN: no edema, CR 1.31, improving off lasix  DM2:  asymptomatic, BS 300s on steroids, still suboptimal ,  titrating basal and premeal insulin  Chronic HTN, chronic AFIB, pulm HTN , Hep C,  Hypothyroid  -- stable        Review of Systems   Constitutional: Negative for fever, chills, weight loss and malaise/fatigue.   Eyes: Negative for blurred vision.   Respiratory: Positive for wheezing.    Cardiovascular: Negative for chest pain.   Gastrointestinal: Negative for heartburn and nausea.   Neurological: Negative for headaches.   Endo/Heme/Allergies: Bruises/bleeds easily.       Quality Measures  Labs reviewed and Medications reviewed  Mckeon catheter: No Mckeon      DVT Prophylaxis: Heparin                  Physical Exam       Filed Vitals:    17 1928 17 0306 17 0706 17 1520   BP: 131/46 129/43 140/64 115/50   Pulse: 62 63 65 74   Temp: 36.1 °C (96.9 °F) 36.5 °C (97.7 °F) 36.7 °C (98 °F) 37.2 °C (99 °F)   Resp: 18 18 18 18   Height:       Weight:       SpO2: 91% 94% 95% 90%     Body mass index is 33.51 kg/(m^2).    Oxygen Therapy:  Pulse Oximetry: 90 %, O2 (LPM): 3, O2 Delivery: Nasal Cannula    Physical Exam   Constitutional: She is oriented to person,  place, and time and well-developed, well-nourished, and in no distress.   HENT:   Head: Normocephalic and atraumatic.   Eyes: Pupils are equal, round, and reactive to light.   Cardiovascular: Normal rate, regular rhythm and normal heart sounds.    Pulmonary/Chest: She has wheezes.   Abdominal: Soft.   Neurological: She is alert and oriented to person, place, and time.         Lab Data Review:      7/1/2017  6:30 PM    Recent Labs      06/30/17 0847  07/02/17   0153   SODIUM  141  142  137   POTASSIUM  3.6  3.7  4.0   CHLORIDE  100  99  94*   CO2  30  32  34*   BUN  82*  82*  75*   CREATININE  1.21  1.21  1.31   MAGNESIUM   --   2.4   PHOSPHORUS  3.8   --    CALCIUM  8.9  9.0  9.0       Recent Labs      06/30/17   0847  07/02/17   0153   ALTSGPT   --   79*   ASTSGOT   --   31   ALKPHOSPHAT   --   59   TBILIRUBIN   --   0.6   GLUCOSE  254*  253*  302*       Recent Labs      06/30/17 0847  07/02/17   0153   RBC  3.26*  3.36*   HEMOGLOBIN  9.5*  9.8*   HEMATOCRIT  30.7*  31.2*   PLATELETCT  238  249       Recent Labs      06/30/17 0847  07/02/17   0153   WBC  17.4*  14.2*   NEUTSPOLYS  76.60*  86.20*   LYMPHOCYTES  12.40*  7.00*   MONOCYTES  5.70  4.90   EOSINOPHILS  3.50  0.50   BASOPHILS  0.10  0.10   ASTSGOT   --   31   ALTSGPT   --   79*   ALKPHOSPHAT   --   59   TBILIRUBIN   --   0.6           Assessment/Plan     * Acute respiratory failure requiring reintubation (CMS-[facility])  (COPD exacerbation, infiltrates due to infectious vs inflammatory pneumonia?)   Assessment & Plan  On admission , had increasing SOB, recent HAP admit 2 weeks PTA, wheezing, acc muscle use, BIPAP --> intubated in ER/airway control, ventilatory faiure, pcR: mid/lower zone infiltrates , bronch: neg cytology, old hemorrhage  Impression: acute/chronic infiltrative O2/CO2 resp failure,  COPD (home O2) , infiltative resp failure infectous vs inflammatory infiltrates  7/2: Pt has improved . On 3-4 lts oxygen at baseline.  Has been trying  to walk to and from the bathroom and said has some difficulty.  PT javi eric and if they are ok, will discharge her.    June 5: CTPE: no PE, pathcy nodular infitlates, bilaterally R>L, ground glass, LL atelectasis, small effusions, mediastinal and hilar andenoapthy      Severe sepsis (CMS-HCC) (Pneumonia, acute respiratory failure)   Assessment & Plan  Resolved.  Stopped lasix for not wanting fluid overload on the patient.  Repeat BNP sent.  To encourage incentive spirometry  Look for hypoxia . Currently on 3-4 lts oxygen    Chronic pain syndrome (present on admission)  Assessment & Plan  Impression: chronic pain (neuropathy, fibromyalgia)      Thyroiditis  Assessment & Plan  Impresson: likely Hashimotos thyroidits, on replacement  Plan: LT4    7/1 sent for tsh and free t4

## 2017-07-04 NOTE — DISCHARGE SUMMARY
[facility] Internal Medicine Discharge Summary      Admit Date:  6/21/2017       Discharge Date:   07/03/2017    Service:   R Internal Medicine Gray Team  Attending Physician(s):   Dr Ingram  Senior Resident(s):  Dr Weeks  Efrain Resident(s):   Dr Wisdom      Primary Diagnosis:     Acute and chronic hypoxemic respiratory failure requiring Mechanical Ventilation   Chronic obstructive pulmonary disease with acute exacerbation.   Acute on Chronic Diastolic Heart Failure with P.HTN.   Health Acquired Pneumonia    Secondary Diagnoses:                Principal Problem:    Acute respiratory failure requiring reintubation (CMS-HCC)  (COPD exacerbation, infiltrates due to infectious vs inflammatory pneumonia?)  POA: Unknown  Active Problems:    Atrial fibrillation (CMS-HCC) (Chronic) POA: Unknown    Severe sepsis (CMS-HCC) (Pneumonia, acute respiratory failure)  POA: Unknown      Overview: *admit(  ++SIRS, respiratory failure , infiltrative, procalcitonin 2.89       --> 2.07H, neg AFB on bronch, neg UC      Impresson: presumed bact PNA , BAL , cultures nondiagnostic      Plan: treat as STREP PNA and/or atyhpical infectious PNA            Chronic hypertension (Chronic) POA: Yes    Chronic pain syndrome (Chronic) POA: Yes    DM2 (diabetes mellitus, type 2) (CMS-HCC) (Chronic) POA: Unknown    Thyroiditis POA: Unknown  Resolved Problems:    * No resolved hospital problems. *      Hospital Summary (Brief Narrative):           65 y/o female with PMHx of COPD on 4L home O2, asthma, EDITH, CKD 3, h/o Afib, DM2, HTN, and PTSD who was brought in from a nursing home after being found by staff to be severely dyspneic .She was recently hospitalized at [facility] in May with an ankle fracture.  She was then readmitted to [facility] from  06/02 -06/09, with ventilator dependent respiratory failure and  healthcare-associated pneumonia. On admission, she was saturating 67% on her normal 4 L of nasal cannula with diffuse audible wheezes and diffuse  crackles. She was unresponsive and was given duo nebs and breathing per minute respirations.  She was brought back to the 90s with breathing per minute respirations, but remained significantly altered and required intubation.She got admitted to ICU for higher level of care, intubated on mechanical support, with IV methylprednisolone and bronchodilators and sepsis protocol. She was empirically started on broad spectrum antibiotics with Zosyn and Vancomycin and received IV Insulin for hyperkaelemia as well kayexalate and her electrolytes were monitored closely.Her cultures from prior admission were all remained negative, UA negative, CXR showed worsened bilateral left lower lobe infiltrates with negative blood culture/urine culture. She underwent bronchoscopy that showed normal anatomy, with no endobronchial tumor, moderate amount of tan secretions were seen and suctioned successfully until cleared. Her BAL showed no acid fast bacilli, with negative gram stain, some light Candida Albicans growth. She required 1 unit PRBCs for acute blood loss on 06/23 and was started on IV lasix for development of acute pulmonary edema secondary to acute on chronic diastolic heart failure with BNP 1186. She developed hypernatremia which got corrected with free water flushes. She had multiple SBT trials with successful extubation on 06/28. Her cultures remained negative and antibiotic was de-escalated to Unasyn.She was transferred onto the medical floor and transitioned from IV  to PO steroids with respiratory therapy. On to the medical floor, she remained alert and comfortable without dyspnea on low flow oxygen nasal cannula with improved breathing, afebrile and hemodynamically stable. Her procalcitonin was repeated that remained negative and Unasyn was discontinued. She continued to get breathing treatments, IV lasix was discontinued with repeat  and clinically remained euvolemic.     Patient /Hospital Summary (Details --  Problem Oriented) :          Acute on Chronic Hypercarbic and Hypoxemic Respiratory failure  #Ventilator-Dependent Respiratory Failure  #Acute Exacerbation of COPD  #HCAP  # Acute on chronic Diastolic Heart Failure  -CXR shows worsened bilateral LL infiltrates; echo normal in 3/2017; LE US neg for DVT in 6/2017; CTPE neg in 6/2017; echo in 2015 shows normal EF with G2 diastolic dysfunction and pulmonary htn;    - s/p intubated and extubation (06/24/17)  - Bronchoscopy with negative cultures.  - ABX: Vanco/Zosyn --> de-escalate to Unasyn (06/24/17)  - RT protocol; solumedrol -->PO Steroids, Duo-nebs  -Discharge on Steroids with Taper and continue with inhalers.    SEPSIS 2/2 to respiratory source  #HCAP  #LEUKOCYTOSIS  - Risk factors:  she has had multiple recent hosptial admissions and is from nursing home.  - Cultures from prior admission all neg; UA neg; CXR shows worsened infiltrates;    - BCx and respiratory cultures negative.  - IVF's and broad spectrum abx (vanco/zosyn)    - De-escalated ABX to Unasyn (06/24/17)  - Repeat Procalcitonin negative and antibiotics were discontinued.     Possible coexisting acute pulmonary edema secondary to acute on chronic    diastolic heart failure.  #Elevated RVSP 60 mmHg.   -Echo showed grade II diastolic dysfunction with Pulmonary hypertension with right ventricular systolic pressure of 60    MmHg.  -BNP on presentation>1000;with coarse crackles on presentation   -s/p IV lasix treatment with improved volume status and repeat .     HYPERKALEMIA    - s/p IV Insulin and Kayxelate.    CKD-3   - stable at baseline; avoid nephrotoxins    DM2  - A1C: 5.2, Hyperglycemia in the setting of steroids.  -Continue with 22 units Lantus and     CHRONIC NORMOCYTIC ANEMIA  - h/h stable since last discharge    HTN   -Resume all home meds.    CHRONIC HEP C    - stable    FIBROMYALGIA    - hold home pain meds    ASTHMA    - continue monteleukast    HYPOTHYROIDISM    - continue home  synthroid 75mcg PO QD    Chronic Medical Problems:    Obstructive sleep apnea.  Gastroesophageal reflux disease.  History of atrial fibrillation.  Fibromyalgia.; Post-traumatic stress disorder.  Anemia.        PROCEDURES  - Bronchoscopy: 06/21/17  - Endotracheal intubation : 06/21/17  - RIJ Central venous catheter: 06/21/      Consultants:     Pulmnology    Procedures:        - Bronchoscopy: 06/21/17  - Endotracheal intubation : 06/21/17  - RIJ Central venous catheter: 06/21/17  Imaging/ Testing:      DX-CHEST-PORTABLE (1 VIEW)   Final Result         1. Interval extubation. No significant interval change.      DX-CHEST-PORTABLE (1 VIEW)   Final Result         1.  Pulmonary edema and/or infiltrates are identified, which are stable since the prior exam.   2.  Cardiomegaly      DX-CHEST-PORTABLE (1 VIEW)   Final Result         1.  Pulmonary edema and/or infiltrates are identified, which appear somewhat increased since the prior exam.   2.  Cardiomegaly      DX-ABDOMEN FOR TUBE PLACEMENT   Final Result      Enteric tube has been placed and the tip projects over the stomach.      DX-CHEST-PORTABLE (1 VIEW)   Final Result      Findings consistent with pulmonary edema, with no significant change.      DX-CHEST-PORTABLE (1 VIEW)   Final Result      Stable chest appearance compared with 6/23.      DX-CHEST-PORTABLE (1 VIEW)   Final Result      Stable chest appearance compared with 6/22.      DX-ABDOMEN FOR TUBE PLACEMENT   Final Result      Feeding tube and nasogastric tube in place as noted above.      DX-CHEST-PORTABLE (1 VIEW)   Final Result      Right central venous line in place, with no pneumothorax. Improving pulmonary edema.      DX-CHEST-PORTABLE (1 VIEW)   Final Result      Mid and lower zone opacities, most consistent with pulmonary edema, overall worse compared with 6/13/2017.            Discharge Medications:         Medication Reconciliation: Completed      Medication List      START taking these medications        Instructions    predniSONE 20 MG Tabs   Last time this was given:  40 mg on 7/3/2017  8:48 AM   Commonly known as:  DELTASONE    Take 2 Tabs by mouth every day. Take 40 mg for 5 days, then Take 30 mg for 3 days then Take 20 mg for 3 days then Take 10 mg for 2 days then Take 5 mg for 2 days and then stop.   Dose:  40 mg         CONTINUE taking these medications       Instructions    amlodipine 10 MG Tabs   Last time this was given:  10 mg on 7/3/2017  8:49 AM   Commonly known as:  NORVASC    Take 1 Tab by mouth every day.   Dose:  10 mg       budesonide-formoterol 160-4.5 MCG/ACT Aero   Commonly known as:  SYMBICORT    Inhale 2 Puffs by mouth 2 Times a Day.   Dose:  2 Puff       carvedilol 3.125 MG Tabs   Last time this was given:  3.125 mg on 7/3/2017  8:48 AM   Commonly known as:  COREG    Take 3.125 mg by mouth 2 times a day, with meals.   Dose:  3.125 mg       COMBIVENT RESPIMAT  MCG/ACT Aers   Generic drug:  ipratropium-albuterol    Inhale 1 Puff by mouth every 6 hours as needed. Uses prn only   Dose:  1 Puff       [facility] PO    Take 1 Tab by mouth every day.   Dose:  1 Tab       docusate sodium 100 MG Caps   Commonly known as:  COLACE    Take 100 mg by mouth 2 times a day.   Dose:  100 mg       duloxetine 60 MG Cpep delayed-release capsule   Last time this was given:  60 mg on 7/3/2017  8:48 AM   Commonly known as:  CYMBALTA    Take 1 Cap by mouth every day.   Dose:  60 mg       folic acid 800 MCG tablet   Commonly known as:  FOLVITE    Take 800 mcg by mouth every day.   Dose:  800 mcg       gabapentin 600 MG tablet   Commonly known as:  NEURONTIN    Take 1 Tab by mouth 3 times a day.   Dose:  600 mg       insulin glargine 100 UNIT/ML Soln   Last time this was given:  30 Units on 7/3/2017  6:22 AM   Commonly known as:  LANTUS    Inject 22 Units as instructed every evening.   Dose:  22 Units       insulin lispro 100 UNIT/ML Soln   Last time this was given:  6 Units on 7/1/2017  12:44 PM   Commonly known as:  HUMALOG    Inject 3-12 Units as instructed 4 Times a Day,Before Meals and at Bedtime.   Dose:  3-12 Units       levothyroxine 75 MCG Tabs   Last time this was given:  75 mcg on 7/3/2017  6:22 AM   Commonly known as:  SYNTHROID    TAKE 1 TABLET BY MOUTH DAILY       lorazepam 0.5 MG Tabs   Commonly known as:  ATIVAN    Take 0.5 mg by mouth every evening.   Dose:  0.5 mg       montelukast 10 MG Tabs   Last time this was given:  10 mg on 7/3/2017  8:49 AM   Commonly known as:  SINGULAIR    Take 1 Tab by mouth every day.   Dose:  10 mg       multivitamin Tabs    Take 1 Tab by mouth every day.   Dose:  1 Tab       omeprazole 40 MG delayed-release capsule   Commonly known as:  PRILOSEC    Take 1 Cap by mouth every day.   Dose:  40 mg       oxycodone immediate release 10 MG immediate release tablet   Last time this was given:  10 mg on 7/3/2017  8:57 AM   Commonly known as:  ROXICODONE    Take 10 mg by mouth every 3 hours as needed for Moderate Pain.   Dose:  10 mg       senna-docusate 8.6-50 MG Tabs   Last time this was given:  2 Tabs on 7/1/2017  8:19 PM   Commonly known as:  PERICOLACE or SENOKOT S    Take 1 Tab by mouth every evening.   Dose:  1 Tab       tiotropium 18 MCG Caps   Commonly known as:  SPIRIVA    Inhale 1 Cap by mouth every day.   Dose:  18 mcg       trazodone 150 MG Tabs   Last time this was given:  300 mg on 7/2/2017  8:41 PM   Commonly known as:  DESYREL    Take 300 mg by mouth every evening.   Dose:  300 mg       * vitamin B-12 1000 MCG Tabs    Take 1,000 mcg by mouth every evening.   Dose:  1000 mcg       * cyanocobalamin 1000 MCG/ML Soln   Commonly known as:  VITAMIN B-12    1,000 mcg by Intramuscular route every Saturday.   Dose:  1000 mcg       Vitamin D3 2000 UNITS Tabs    Take 2,000 Units by mouth every day.   Dose:  2000 Units       * Notice:  This list has 2 medication(s) that are the same as other medications prescribed for you. Read the directions carefully, and  ask your doctor or other care provider to review them with you.      STOP taking these medications          hydrocortisone 5 MG Tabs   Commonly known as:  CORTEF       moxifloxacin 400 MG Tabs   Commonly known as:  AVELOX       potassium chloride SA 20 MEQ Tbcr   Commonly known as:  Kdur             Disposition:   AT HOME    Diet:   Diabetic Diet    Activity:   As tolerated    Instructions:      Please follow up with PCP within 1 -2 weeks.   The patient was instructed to return to the ER in the event of worsening symptoms. I have counseled the patient on the importance of compliance and the patient has agreed to proceed with all medical recommendations and follow up plan indicated above.   The patient understands that all medications come with benefits and risks. Risks may include permanent injury or death and these risks can be minimized with close reassessment and monitoring.        Primary Care Provider:    Pcp Pt States None    Discharge summary faxed to primary care provider:  Completed  Copy of discharge summary given to the patient: Completed    Follow up appointment details :      Follow up with PCP within 1 -2 weeks.     Pending Studies:        None    Time spent on discharge day patient visit, preparing discharge paperwork and arranging for patient follow up.    Summary of follow up issues:       Discharge Time (Minutes) :    60 minutes.    Condition on Discharge    ______________________________________________________________________    Interval history/exam for day of discharge:    Patient denies any fever/chills, shortness of breath, chest pain. She was able to ambulate without difficulty.     Filed Vitals:    07/02/17 1520 07/02/17 1945 07/03/17 0339 07/03/17 0725   BP: 115/50 113/55 112/39 122/40   Pulse: 74 76 54 75   Temp: 37.2 °C (99 °F) 36.6 °C (97.8 °F) 35.9 °C (96.7 °F) 36.6 °C (97.8 °F)   Resp: 18 16 18 18   Height:       Weight:       SpO2: 90% 93%  98%     Weight/BMI: Body mass index is  33.51 kg/(m^2).  Pulse Oximetry: 98 %, O2 (LPM): 3, O2 Delivery: Nasal Cannula    General: Sitting comfortably without any acute distress.   CVS: S1, S2 normal with no murmurs/gallops/rubs.  PULM:Clear to auscultation with no good air entry.       Most Recent Labs:    Lab Results   Component Value Date/Time    WBC 14.2* 07/02/2017 01:53 AM    RBC 3.36* 07/02/2017 01:53 AM    HEMOGLOBIN 9.8* 07/02/2017 01:53 AM    HEMATOCRIT 31.2* 07/02/2017 01:53 AM    MCV 92.9 07/02/2017 01:53 AM    MCH 29.2 07/02/2017 01:53 AM    MCHC 31.4* 07/02/2017 01:53 AM    MPV 9.9 07/02/2017 01:53 AM    NEUTROPHILS-POLYS 86.20* 07/02/2017 01:53 AM    LYMPHOCYTES 7.00* 07/02/2017 01:53 AM    MONOCYTES 4.90 07/02/2017 01:53 AM    EOSINOPHILS 0.50 07/02/2017 01:53 AM    BASOPHILS 0.10 07/02/2017 01:53 AM    HYPOCHROMIA 1+ 06/18/2017 02:26 AM    ANISOCYTOSIS 1+ 06/18/2017 02:26 AM      Lab Results   Component Value Date/Time    SODIUM 138 07/03/2017 03:26 AM    POTASSIUM 4.0 07/03/2017 03:26 AM    CHLORIDE 98 07/03/2017 03:26 AM    CO2 37* 07/03/2017 03:26 AM    GLUCOSE 195* 07/03/2017 03:26 AM    BUN 68* 07/03/2017 03:26 AM    CREATININE 1.34 07/03/2017 03:26 AM      Lab Results   Component Value Date/Time    ALT(SGPT) 86* 07/03/2017 03:26 AM    AST(SGOT) 34 07/03/2017 03:26 AM    ALKALINE PHOSPHATASE 57 07/03/2017 03:26 AM    TOTAL BILIRUBIN 0.5 07/03/2017 03:26 AM    LIPASE 27 05/08/2015 04:05 PM    ALBUMIN 2.8* 07/03/2017 03:26 AM    GLOBULIN 2.6 07/03/2017 03:26 AM    PRE-ALBUMIN 29.0 07/03/2017 03:26 AM    INR 1.09 06/21/2017 10:40 PM    MACROCYTOSIS 1+ 06/18/2017 02:26 AM     Lab Results   Component Value Date/Time    PT 14.4 06/21/2017 10:40 PM    INR 1.09 06/21/2017 10:40 PM

## 2017-07-05 ENCOUNTER — PATIENT OUTREACH (OUTPATIENT)
Dept: HEALTH INFORMATION MANAGEMENT | Facility: OTHER | Age: 67
End: 2017-07-05

## 2017-07-06 ENCOUNTER — PATIENT OUTREACH (OUTPATIENT)
Dept: HEALTH INFORMATION MANAGEMENT | Facility: OTHER | Age: 67
End: 2017-07-06

## 2017-07-06 RX ORDER — CARVEDILOL 3.12 MG/1
3.12 TABLET ORAL 2 TIMES DAILY WITH MEALS
Qty: 180 TAB | Refills: 0 | Status: SHIPPED | OUTPATIENT
Start: 2017-07-06 | End: 2017-09-28

## 2017-07-07 ENCOUNTER — TELEPHONE (OUTPATIENT)
Dept: MEDICAL GROUP | Facility: PHYSICIAN GROUP | Age: 67
End: 2017-07-07

## 2017-07-07 NOTE — TELEPHONE ENCOUNTER
Helena camilo [place] called and stated that there has been a delay in start of care due to scheduling conflicts with Priya and the Physical Therapist. She stated that they should see her this weekend. She can be reached at [phone].

## 2017-07-14 RX ORDER — AMLODIPINE BESYLATE 10 MG/1
10 TABLET ORAL DAILY
Qty: 90 TAB | Refills: 1 | Status: SHIPPED | OUTPATIENT
Start: 2017-07-14 | End: 2018-07-05

## 2017-07-14 NOTE — TELEPHONE ENCOUNTER
Refill X 6 months, sent to pharmacy.Pt. Seen in the last 6 months per protocol.   Lab Results   Component Value Date/Time    SODIUM 138 07/03/2017 03:26 AM    POTASSIUM 4.0 07/03/2017 03:26 AM    CHLORIDE 98 07/03/2017 03:26 AM    CO2 37* 07/03/2017 03:26 AM    GLUCOSE 195* 07/03/2017 03:26 AM    BUN 68* 07/03/2017 03:26 AM    CREATININE 1.34 07/03/2017 03:26 AM

## 2017-07-17 ENCOUNTER — TELEPHONE (OUTPATIENT)
Dept: HEALTH INFORMATION MANAGEMENT | Facility: OTHER | Age: 67
End: 2017-07-17

## 2017-07-17 ENCOUNTER — APPOINTMENT (OUTPATIENT)
Dept: HEMATOLOGY ONCOLOGY | Facility: MEDICAL CENTER | Age: 67
End: 2017-07-17
Payer: MEDICARE

## 2017-07-17 ENCOUNTER — PATIENT OUTREACH (OUTPATIENT)
Dept: HEALTH INFORMATION MANAGEMENT | Facility: OTHER | Age: 67
End: 2017-07-17

## 2017-07-17 DIAGNOSIS — Z78.0 MENOPAUSE: ICD-10-CM

## 2017-07-17 NOTE — TELEPHONE ENCOUNTER
This patient is overdue for health maintenance topics that need orders so she can complete them.     Please review the pended orders in  to sign or make adjustments as appropriate.    Close the encounter when complete.  If declining these orders, please document a reason and route to the Outreach MA pool (p AMB  Outreach).  I will call the patient to cancel the appointment.

## 2017-07-18 DIAGNOSIS — B49 FUNGAL INFECTION: ICD-10-CM

## 2017-07-18 RX ORDER — BUDESONIDE AND FORMOTEROL FUMARATE DIHYDRATE 160; 4.5 UG/1; UG/1
2 AEROSOL RESPIRATORY (INHALATION) 2 TIMES DAILY
Qty: 1 INHALER | Refills: 11 | Status: SHIPPED | OUTPATIENT
Start: 2017-07-18 | End: 2018-08-06 | Stop reason: SDUPTHER

## 2017-07-18 NOTE — TELEPHONE ENCOUNTER
Dr Mccall- pt was initiated on this med while inpatient. It seems as though you have an upcoming appt to establish with pt. Please refill as you see fit.

## 2017-07-18 NOTE — TELEPHONE ENCOUNTER
Was the patient seen in the last year in this department? Yes   2/17  Has an appt with Dr. Roberson  8/29/17    Does patient have an active prescription for medications requested? No     Received Request Via: Pharmacy

## 2017-07-20 LAB
FUNGUS SPEC CULT: ABNORMAL
FUNGUS SPEC CULT: ABNORMAL
SIGNIFICANT IND 70042: ABNORMAL
SITE SITE: ABNORMAL
SOURCE SOURCE: ABNORMAL

## 2017-07-21 ENCOUNTER — TELEPHONE (OUTPATIENT)
Dept: MEDICAL GROUP | Facility: PHYSICIAN GROUP | Age: 67
End: 2017-07-21

## 2017-07-21 DIAGNOSIS — R41.0 CONFUSION: ICD-10-CM

## 2017-07-21 NOTE — TELEPHONE ENCOUNTER
Kurtis OT from [facility] called stating that they want a order for skilled nursing to go and see pt due to confusions of medications/routine at home.  She has an appt with  end of next month.

## 2017-07-25 ENCOUNTER — PATIENT OUTREACH (OUTPATIENT)
Dept: HEALTH INFORMATION MANAGEMENT | Facility: OTHER | Age: 67
End: 2017-07-25

## 2017-07-25 NOTE — PROGRESS NOTES
LSW outreach to pt to introduce self as new CC LSW and follow-up with pt regarding previous work pt and LSW Avery were working on.     LSW introduced self and inquired if pt was being followed by [facility] at this time. Pt reports that she is in fact being seen by [facility] for PT/OT. LSW inquired if pt has had the chance to reapply for Medicaid as discussed by her and LSW Avery during their previous encounter. Per pt, she has not had the chance to re-apply for Medicaid, but is very interested in doing this. LSW discussed possibly using the [facility] LSW to assist with these applications while she is being seen by [facility]. Pt agreeable and very interested in beginning application with LSW through [place].     LSW offered to contact [facility] and see if they are able to get an LSW to visit pt to assist in Medicaid application.     LSW provided pt with contact information and encouraged pt to call this LSW once discharged from [facility]. Pt agreeable to plan.     Plan:  -LSW to reach out to [facility] LSW and request visit be made to assist pt in re-applying for Medicaid  -Pt to be followed by [facility] LSW while on service with [facility]  -Pt to reach out to this LSW once discharged from [facility] to follow-up with any needs or CC social service needs

## 2017-07-26 ENCOUNTER — PATIENT OUTREACH (OUTPATIENT)
Dept: HEALTH INFORMATION MANAGEMENT | Facility: OTHER | Age: 67
End: 2017-07-26

## 2017-07-27 NOTE — PROGRESS NOTES
Patient Priya Callahan was admitted on 3/7/2017  to 3/17/2017 at [facility], and discharged with a diagnosis of sepsis & septic shock and a LACE score of 77. They were discharged to [facility]. with instructions to follow up with their  PCP within 1 week of discharge. They were also ordered to follow up with their pulmonologist within 2 weeks of discharge. Patient Advocate was able to confirm that the patient picked up all of their medications. Patient was discharged home with oxygen and home health and the patient advocate confirmed they were received as ordered.  Patient understood all of their discharge orders. Patient Advocate was able to engage with the patient and assist with getting the patient’s tanks refilled.  Unfortunately, patient readmitted on 4/1/17 to 4/9/17 with a diagnosis of sepsis, and a Lace score of 77. She was again ordered to follow up with her PCP within 1 week. She also restarted on HHC through [facility]. [facility] Patient Advocate was able to again connect with the patient and make sure she received her medication and HHC as ordered. She unfortunately readmitted from 4/24/17 to 5/4/17 for COPD exacerbation. She was again ordered to follow up with her PCP within a week. [facility] Patient advocate was able to connect with the patient again after discharge and confirm she received HHC and medications as needed. Of note, the patient’s medical team recommended the patient go to SNF before D/C’ing home. Patient also declined all offers to schedule her PCP appts earlier. At time of closing, the patient’s appointments are as follows:  • 03/19/17 - [facility] - services initiated  • 03/27/17 - Dr. Lawson - PCP - appt kept  • 05/03/17 - Dr. Mccall - PCP - per office, pt not established, no appt

## 2017-07-29 LAB — EKG IMPRESSION: NORMAL

## 2017-08-02 DIAGNOSIS — M47.817 LUMBOSACRAL SPONDYLOSIS WITHOUT MYELOPATHY: ICD-10-CM

## 2017-08-02 RX ORDER — GABAPENTIN 600 MG/1
600 TABLET ORAL 3 TIMES DAILY
Qty: 270 TAB | Refills: 1 | Status: SHIPPED | OUTPATIENT
Start: 2017-08-02 | End: 2017-09-28

## 2017-08-02 RX ORDER — GABAPENTIN 600 MG/1
TABLET ORAL
Qty: 270 TAB | OUTPATIENT
Start: 2017-08-02

## 2017-08-02 RX ORDER — TRAZODONE HYDROCHLORIDE 100 MG/1
TABLET ORAL
Qty: 90 TAB | Refills: 0 | OUTPATIENT
Start: 2017-08-02

## 2017-08-02 RX ORDER — ESCITALOPRAM OXALATE 10 MG/1
TABLET ORAL
Qty: 90 TAB | OUTPATIENT
Start: 2017-08-02

## 2017-08-02 NOTE — TELEPHONE ENCOUNTER
Dr. Mccall- You have upcoming appt to establish care with pt. Trazodone was originally prescribed in hospital, and HCTZ and Lexapro were both d/c'd in hospital. Please fill as you see fit.

## 2017-08-03 ENCOUNTER — PATIENT OUTREACH (OUTPATIENT)
Dept: HEALTH INFORMATION MANAGEMENT | Facility: OTHER | Age: 67
End: 2017-08-03

## 2017-08-04 ENCOUNTER — TELEPHONE (OUTPATIENT)
Dept: MEDICAL GROUP | Facility: PHYSICIAN GROUP | Age: 67
End: 2017-08-04

## 2017-08-05 DIAGNOSIS — E11.65 UNCONTROLLED TYPE 2 DIABETES MELLITUS WITH CHRONIC KIDNEY DISEASE, WITH LONG-TERM CURRENT USE OF INSULIN, UNSPECIFIED CKD STAGE: ICD-10-CM

## 2017-08-05 DIAGNOSIS — E11.22 UNCONTROLLED TYPE 2 DIABETES MELLITUS WITH CHRONIC KIDNEY DISEASE, WITH LONG-TERM CURRENT USE OF INSULIN, UNSPECIFIED CKD STAGE: ICD-10-CM

## 2017-08-05 DIAGNOSIS — Z79.4 UNCONTROLLED TYPE 2 DIABETES MELLITUS WITH CHRONIC KIDNEY DISEASE, WITH LONG-TERM CURRENT USE OF INSULIN, UNSPECIFIED CKD STAGE: ICD-10-CM

## 2017-08-05 RX ORDER — BLOOD-GLUCOSE METER
KIT MISCELLANEOUS
Qty: 1 DEVICE | Refills: 0 | Status: ON HOLD | OUTPATIENT
Start: 2017-08-05 | End: 2017-11-12

## 2017-08-05 NOTE — TELEPHONE ENCOUNTER
1. Caller Name: [facility] RN                                         Call Back Number: [phone]      Patient approves a detailed voicemail message: N\A    [facility] RN called stating pt accidently ran over her [employer] monitor with her electric wheelchair.  please order.  Also, nurse has a few questions on some medication.  they are staing that she is not taking her insulin like she should.  also she dropped her O2 tank on her ankle that she previously broken.  I advised that I will send this off to her provider.  Nurse to send over notes through fax.     Pt last seen on 3.17.17 with Dr. Rojas

## 2017-08-06 NOTE — TELEPHONE ENCOUNTER
New glucometer ordered.   What dose of insulin is she taking now, how frequently? What are her blood sugars? Does she have any below 70 or above 160?   Is she able to walk on her ankle? Does she have severe pain? If she cannot walk on it or has severe pain, I will order an xray. She must use ice on it 20 min on and 20 min off.   Kavitha Mccall M.D.

## 2017-08-07 RX ORDER — TRAZODONE HYDROCHLORIDE 100 MG/1
TABLET ORAL
Qty: 90 TAB | Refills: 0 | Status: ON HOLD | OUTPATIENT
Start: 2017-08-07 | End: 2017-11-12

## 2017-08-07 NOTE — TELEPHONE ENCOUNTER
I spoke with the patient. She stated that she uses 20 units of insulin at night. Her BS readings have been in the  range. She is able to walk on her ankle. Does not have severe pain. She has an appt with the orthopedic doctor on 8/15/17.

## 2017-08-09 ENCOUNTER — TELEMEDICINE ORIGINATING SITE VISIT (OUTPATIENT)
Dept: MEDICAL GROUP | Facility: PHYSICIAN GROUP | Age: 67
End: 2017-08-09
Payer: MEDICARE

## 2017-08-09 ENCOUNTER — TELEMEDICINE2 (OUTPATIENT)
Dept: MEDICAL GROUP | Facility: PHYSICIAN GROUP | Age: 67
End: 2017-08-09
Payer: MEDICARE

## 2017-08-09 VITALS
OXYGEN SATURATION: 94 % | DIASTOLIC BLOOD PRESSURE: 84 MMHG | BODY MASS INDEX: 35.65 KG/M2 | HEIGHT: 65 IN | HEART RATE: 71 BPM | SYSTOLIC BLOOD PRESSURE: 116 MMHG | WEIGHT: 214 LBS

## 2017-08-09 DIAGNOSIS — G47.33 OSA (OBSTRUCTIVE SLEEP APNEA): Chronic | ICD-10-CM

## 2017-08-09 DIAGNOSIS — J43.9 PULMONARY EMPHYSEMA, UNSPECIFIED EMPHYSEMA TYPE (HCC): Chronic | ICD-10-CM

## 2017-08-09 DIAGNOSIS — I10 CHRONIC HYPERTENSION: Chronic | ICD-10-CM

## 2017-08-09 DIAGNOSIS — I51.89 DIASTOLIC DYSFUNCTION: ICD-10-CM

## 2017-08-09 DIAGNOSIS — I27.20 PULMONARY HYPERTENSION (HCC): Chronic | ICD-10-CM

## 2017-08-09 DIAGNOSIS — N18.2 CKD (CHRONIC KIDNEY DISEASE), STAGE II: Chronic | ICD-10-CM

## 2017-08-09 DIAGNOSIS — I50.33 ACUTE ON CHRONIC DIASTOLIC CHF (CONGESTIVE HEART FAILURE), NYHA CLASS 1 (HCC): ICD-10-CM

## 2017-08-09 DIAGNOSIS — E66.01 MORBID OBESITY DUE TO EXCESS CALORIES (HCC): Chronic | ICD-10-CM

## 2017-08-09 PROCEDURE — 99204 OFFICE O/P NEW MOD 45 MIN: CPT | Mod: GT | Performed by: INTERNAL MEDICINE

## 2017-08-09 NOTE — MR AVS SNAPSHOT
"        Priya Callahan   2017 8:30 AM   Telemedicine2   MRN: [id number]    Department:  [facility]   Dept Phone:  [phone]    Description:  Female : 1950   Provider:  Parvez Garcia M.D.; [facility]; [facility] [place]           Reason for Visit     New Patient           Allergies as of 2017     Allergen Noted Reactions    Vitamin C 10/29/2013   Diarrhea    \"violent diarrhea\"    Keflex 10/29/2013   Rash    Severe rash, but TOLERATES PENICILLINS, Rocephin     Codeine 10/29/2013   Nausea    Severe stomach pain    Lyrica 10/29/2013   Nausea    Nausea, dizzy    Sudafed 10/29/2013   Nausea, Unspecified    Chest pain, nausea      You were diagnosed with     Pulmonary hypertension (CMS-HCC)   [881211]   Mixed group 2 and 3    Pulmonary emphysema, unspecified emphysema type (CMS-HCC)   [5589570]       Diastolic dysfunction   [307300]   Grade 2    EDITH (obstructive sleep apnea)   [446227]       Uncontrolled type 2 diabetes mellitus with stage 3 chronic kidney disease, with long-term current use of insulin (CMS-HCC)   [1871717]       CKD (chronic kidney disease), stage II   [380090]       Chronic hypertension   [6362211]       Morbid obesity due to excess calories (CMS-[facility])   [8492141]         Vital Signs     Blood Pressure Pulse Height Weight Body Mass Index Oxygen Saturation    116/84 mmHg 71 1.651 m (5' 5\") 97.07 kg (214 lb) 35.61 kg/m2 94%    Smoking Status                   Former Smoker           Basic Information     Date Of Birth Sex Race Ethnicity Preferred Language    1950 Female White Non- English      Your appointments     Aug 15, 2017  3:00 PM   Hematology New Patient with Brian Olea M.D.   [facility] (--)    [address] [place] [address], [address]  [facility] [address] [phone]            Aug 29, 2017  1:20 PM   NEW TO YOU with Kavitha Mccall M.D.   [facility]    [address] [facility] [address]   "   [phone]            Sep 05, 2017  1:30 PM   BONE DENSITY (DEXASCAN) with [facility] BD 1   [facility])    [address]  [address]  [facility] [address]   [phone]           No calcium supplements 24 hours prior to exam, including antacids or multivitamins w/calcium.  Pt may eat and drink normally.  No injection procedures prior to Dexa on the same day.  No barium contrast, no CTs with IV or oral contrast, no Pet/CTs and no Nuc Med injections for 7 days prior to a Dexa (including Barium Swallow, Upper GI, Small Bowel Series).  If scheduling a Dexa on the same day as a CT with IV or oral contrast, any test with barium, Pet/CT or a Nuc Med with injection, always schedule the Dexa before the other study.            Nov 02, 2017  2:00 PM   New Patient with Vito Burns M.D.   [facility] (--)    [address], [address]  [facility] [id number] [phone]           Please bring Photo ID, Insurance Cards, All Medication Bottles and copies of any legal documents (such as Living Will, Power of ) If speaking a language besides English please bring an adult . Please arrive 30 minutes prior for check in and registration. You will be receiving a confirmation call a few days before your appointment from our automated call confirmation system.              Problem List              ICD-10-CM Priority Class Noted - Resolved    Morbid obesity (CMS-[facility]) (Chronic) E66.01 Low  10/30/2013 - Present    DM type 2, uncontrolled, with renal complications (CMS-HCC) (Chronic) E11.29, E11.65 Medium  10/30/2013 - Present    Chronic pain G89.29 Low  10/30/2013 - Present    Iron deficiency anemia D50.9 Medium  10/30/2013 - Present    Acute on chronic diastolic CHF (congestive heart failure), NYHA class 1 (CMS-[facility]) I50.33   11/20/2013 - Present    Major depressive disorder (CMS-HCC) (Chronic) F32.9 Low  11/20/2013 - Present    COPD (chronic obstructive pulmonary  disease) (CMS-[facility]) (Chronic) J44.9 High  11/20/2013 - Present    HTN (hypertension) I10 Low  11/20/2013 - Present    Chronic kidney disease, stage 3 N18.3 Low  1/6/2014 - Present    Chronic knee pain M25.569, G89.29   2/25/2014 - Present    Osteoarthritis M19.90 Low  2/25/2014 - Present    Insomnia G47.00 Low  2/25/2014 - Present    Hair loss L65.9   2/25/2014 - Present    Hypothyroidism (Chronic) E03.9 Low  4/1/2014 - Present    Vitamin D deficiency (Chronic) E55.9 Low  6/12/2014 - Present    Lumbar spondylosis with myelopathy M47.16   6/17/2014 - Present    Nocturnal hypoxia G47.34   7/21/2014 - Present    Pain of right heel M79.671   7/21/2014 - Present    Chronic pain syndrome (Chronic) G89.4 Low  8/28/2014 - Present    EDITH (obstructive sleep apnea) (Chronic) G47.33 Low  1/27/2015 - Present    Hypertensive heart disease with heart failure (CMS-HCC) (Chronic) I11.0 High  4/27/2015 - Present    Chronic respiratory failure (CMS-HCC) J96.10 Low  4/28/2015 - Present    Chronic constipation K59.09   7/7/2015 - Present    Pulmonary nodules R91.8   7/7/2015 - Present    Lumbosacral spondylosis without myelopathy M47.817   7/10/2015 - Present    Osteoarthritis, knee M17.10   9/2/2015 - Present    Osteoarthritis of spine with radiculopathy, lumbar region M47.26   10/2/2015 - Present    Bilateral hand pain M79.641, M79.642   12/21/2015 - Present    Primary osteoarthritis of both hands M19.041, M19.042   12/21/2015 - Present    Gastroesophageal reflux disease with esophagitis K21.0 Low  12/22/2015 - Present    Myoclonic jerking G25.3   4/12/2016 - Present    Cough R05   8/18/2016 - Present    Other spondylosis with radiculopathy, lumbar region M47.26   9/16/2016 - Present    Tobacco abuse disorder Z72.0   9/27/2016 - Present    Toenail fungus B35.1   11/21/2016 - Present    Septic shock (CMS-HCC) A41.9, R65.21 High  3/7/2017 - Present    Hyponatremia E87.1   3/8/2017 - Present    Hyperkalemia E87.5   3/8/2017 - Present     Acute on chronic renal failure (CMS-[facility]) N17.9, N18.9   3/8/2017 - Present    Acute on chronic respiratory failure (CMS-HCC) J96.20   3/8/2017 - Present    Severe sepsis with septic shock (CMS-HCC) A41.9, R65.21   3/8/2017 - Present    Diabetes mellitus, type II (CMS-HCC) (Chronic) E11.9 Low  4/4/2017 - Present    HAN (acute kidney injury) (CMS-HCC) N17.9 Medium  4/4/2017 - Present    CKD (chronic kidney disease), stage II (Chronic) N18.2 Medium  4/4/2017 - Present    HLD (hyperlipidemia) (Chronic) E78.5 Low  4/4/2017 - Present    Encephalopathy G93.40 High  4/4/2017 - Present    Neuropathy (CMS-HCC) G62.9 Low  4/4/2017 - Present    Leukocytosis D72.829 Low  4/4/2017 - Present    Hyperbilirubinemia E80.6 Medium  4/4/2017 - Present    Sepsis (CMS-HCC) A41.9   4/25/2017 - Present    Elevated troponin R74.8   4/25/2017 - Present    CAP (community acquired pneumonia) J18.9   4/26/2017 - Present    Tibia/fibula fracture (Chronic) S82.209A, S82.409A High  5/7/2017 - Present    Pneumonia J18.9 Medium  5/8/2017 - Present    Acute exacerbation of chronic obstructive pulmonary disease (COPD) (CMS-HCC) J44.1 Medium  5/8/2017 - Present    Anemia of chronic disease (Chronic) D63.8   5/27/2017 - Present    Anemia requiring transfusions D64.9   5/27/2017 - Present    Hypoxia R09.02   6/5/2017 - Present    Acute respiratory failure requiring reintubation (CMS-HCC)  (COPD exacerbation, infiltrates due to infectious vs inflammatory pneumonia?)  J96.00 High  6/22/2017 - Present    Chronic hypertension (Chronic) I10 Medium  6/30/2017 - Present    Atrial fibrillation (CMS-HCC) (Chronic) I48.91 High  6/30/2017 - Present    DM2 (diabetes mellitus, type 2) (CMS-HCC) (Chronic) E11.9 Low  6/30/2017 - Present    Thyroiditis E06.9 Low  6/30/2017 - Present    Severe sepsis (CMS-HCC) (Pneumonia, acute respiratory failure)  A41.9, R65.20 High  6/30/2017 - Present      Health Maintenance        Date Due Completion Dates    RETINAL SCREENING  9/26/1967 ---    IMM DTaP/Tdap/Td Vaccine (1 - Tdap) 9/26/1968 ---    IMM ZOSTER VACCINE 9/26/2009 ---    BONE DENSITY 9/26/2014 ---    MAMMOGRAM 2/17/2016 2/17/2015    DIABETES MONOFILAMENT / LE EXAM 6/7/2017 6/7/2016    IMM INFLUENZA (1) 9/1/2017 9/27/2016, 11/11/2013    A1C SCREENING 12/21/2017 6/21/2017, 3/7/2017, 1/26/2017, 5/27/2016, 12/17/2015, 8/11/2015, 2/24/2015, 6/2/2014, 8/31/2012    URINE ACR / MICROALBUMIN 1/26/2018 1/26/2017, 12/17/2015, 2/24/2015, 8/31/2012    COLON CANCER SCREENING ANNUAL FIT 4/3/2018 4/3/2017, 12/28/2015, 3/7/2015    FASTING LIPID PROFILE 5/15/2018 5/15/2017, 3/8/2017, 1/26/2017, 12/17/2015, 2/24/2015, 6/2/2014, 8/31/2012    SERUM CREATININE 7/3/2018 7/3/2017, 7/2/2017, 6/30/2017, 6/30/2017, 6/29/2017, 6/28/2017, 6/27/2017, 6/26/2017, 6/25/2017, 6/24/2017, 6/23/2017, 6/22/2017, 6/21/2017, 6/20/2017, 6/18/2017, 6/17/2017, 6/15/2017, 6/14/2017, 6/12/2017, 6/10/2017, 6/9/2017, 6/8/2017, 6/7/2017, 6/6/2017, 6/5/2017, 6/4/2017, 6/3/2017, 6/2/2017, 5/31/2017, 5/27/2017, 5/23/2017, 5/21/2017, 5/19/2017, 5/11/2017, 5/10/2017, 5/9/2017, 5/8/2017, 5/7/2017, 5/3/2017, 5/1/2017, 4/30/2017, 4/29/2017, 4/28/2017, 4/26/2017, 4/25/2017, 4/24/2017, 4/9/2017, 4/8/2017, 4/6/2017, 4/5/2017, 4/2/2017, 4/1/2017, 3/17/2017, 3/16/2017, 3/15/2017, 3/14/2017, 3/13/2017, 3/12/2017, 3/11/2017, 3/10/2017, 3/9/2017, 3/8/2017, 3/7/2017, 1/26/2017, 5/27/2016, 12/17/2015, 8/11/2015, 5/8/2015, 3/19/2015, 2/27/2015, 2/24/2015, 6/2/2014, 1/13/2014, 11/22/2013, 11/21/2013, 11/20/2013, 11/19/2013, 11/14/2013, 11/13/2013, 11/12/2013, 11/11/2013, 11/10/2013, 11/9/2013, 11/8/2013, 11/7/2013, 11/6/2013, 11/5/2013, 11/4/2013, 11/3/2013, 11/2/2013, 11/1/2013, 10/31/2013, 10/30/2013, 10/29/2013, 8/31/2012            Current Immunizations     13-VALENT PCV PREVNAR 9/28/2016    Influenza TIV (IM) 9/27/2016, 11/11/2013 11:45 AM    Pneumococcal polysaccharide vaccine (PPSV-23) 12/11/2014, 10/22/2013      Below and/or attached are  the medications your provider expects you to take. Review all of your home medications and newly ordered medications with your provider and/or pharmacist. Follow medication instructions as directed by your provider and/or pharmacist. Please keep your medication list with you and share with your provider. Update the information when medications are discontinued, doses are changed, or new medications (including over-the-counter products) are added; and carry medication information at all times in the event of emergency situations     Allergies:  VITAMIN C - Diarrhea     KEFLEX - Rash     CODEINE - Nausea     LYRICA - Nausea     SUDAFED - Nausea,Unspecified               Medications  Valid as of: August 09, 2017 - 10:57 AM    Generic Name Brand Name Tablet Size Instructions for use    AmLODIPine Besylate (Tab) NORVASC 10 MG Take 1 Tab by mouth every day.        Blood Glucose Monitoring Suppl (Device) FREESTYLE LITE  Check blood sugars three times daily.        Budesonide-Formoterol Fumarate (Aerosol) SYMBICORT 160-4.5 MCG/ACT Inhale 2 Puffs by mouth 2 Times a Day.        Carvedilol (Tab) COREG 3.125 MG Take 1 Tab by mouth 2 times a day, with meals.        Cholecalciferol (Tab) Vitamin D3 2000 UNITS Take 2,000 Units by mouth every day.        Cyanocobalamin (Tab) vitamin B-12 1000 MCG Take 1,000 mcg by mouth every evening.        Cyanocobalamin (Solution) VITAMIN B-12 1000 MCG/ML 1,000 mcg by Intramuscular route every Saturday.        Docusate Sodium (Cap) COLACE 100 MG Take 100 mg by mouth 2 times a day.        DULoxetine HCl (Cap DR Particles) CYMBALTA 60 MG Take 1 Cap by mouth every day.        Folic Acid (Tab) FOLVITE 800 MCG Take 800 mcg by mouth every day.        Gabapentin (Tab) NEURONTIN 600 MG Take 1 Tab by mouth 3 times a day.        Insulin Glargine (Solution) LANTUS 100 UNIT/ML Inject 22 Units as instructed every evening.        Insulin Lispro (Solution) HUMALOG 100 UNIT/ML Inject 3-12 Units as instructed 4  Times a Day,Before Meals and at Bedtime.        Ipratropium-Albuterol (Aero Soln) COMBIVENT RESPIMAT  MCG/ACT Inhale 1 Puff by mouth every 6 hours as needed. Uses prn only        Lactobacillus-Inulin   Take 1 Tab by mouth every day.        Levothyroxine Sodium (Tab) SYNTHROID 75 MCG TAKE 1 TABLET BY MOUTH DAILY        LORazepam (Tab) ATIVAN 0.5 MG Take 0.5 mg by mouth every evening.        Montelukast Sodium (Tab) SINGULAIR 10 MG Take 1 Tab by mouth every day.        Multiple Vitamin (Tab) THERAGRAN  Take 1 Tab by mouth every day.        Nystatin-Triamcinolone (Cream) MYCOLOG 574334-9.1 UNIT/GM-% Apply in a thin layer to fungal rash once per day prn        Omeprazole (CAPSULE DELAYED RELEASE) PRILOSEC 40 MG Take 1 Cap by mouth every day.        OxyCODONE HCl (Tab) ROXICODONE 10 MG Take 10 mg by mouth every 3 hours as needed for Moderate Pain.        PredniSONE (Tab) DELTASONE 20 MG Take 2 Tabs by mouth every day. Take 40 mg for 5 days, then  Take 30 mg for 3 days then  Take 20 mg for 3 days then  Take 10 mg for 2 days then  Take 5 mg for 2 days and then stop.        Sennosides-Docusate Sodium (Tab) PERICOLACE or SENOKOT S 8.6-50 MG Take 1 Tab by mouth every evening.        Tiotropium Bromide Monohydrate (Cap) SPIRIVA 18 MCG Inhale 1 Cap by mouth every day.        TraZODone HCl (Tab) DESYREL 100 MG TAKE 3 TABLETS BY MOUTH NIGHTLY AT BEDTIME AS NEEDED FOR SLEEP        .                 Medicines prescribed today were sent to:     [facility] - [address] [place] [id number]    Phone: [phone] Fax: [phone]    Open 24 Hours?: No      Medication refill instructions:       If your prescription bottle indicates you have medication refills left, it is not necessary to call your provider’s office. Please contact your pharmacy and they will refill your medication.    If your prescription bottle indicates you do not have any refills left, you may request refills at any time  through one of the following ways: The online [facility] system (except Urgent Care), by calling your provider’s office, or by asking your pharmacy to contact your provider’s office with a refill request. Medication refills are processed only during regular business hours and may not be available until the next business day. Your provider may request additional information or to have a follow-up visit with you prior to refilling your medication.   *Please Note: Medication refills are assigned a new Rx number when refilled electronically. Your pharmacy may indicate that no refills were authorized even though a new prescription for the same medication is available at the pharmacy. Please request the medicine by name with the pharmacy before contacting your provider for a refill.        Other Notes About Your Plan     On pain contract with Dr. Lawson  Last UDS: 3/26/2015 Dr Lawson  Contolled Substance agreement signed: 8/28/2014    Comprehensive Medication Review: appt 11.18.2014 in [place].                 [employer] Status: Patient Declined

## 2017-08-09 NOTE — PROGRESS NOTES
This consultation was conducted utilizing secure and encrypted videoconferencing equipment with the assistance of a trained tele-presenter at the originating site.

## 2017-08-09 NOTE — PROGRESS NOTES
"Subjective:   Priya Callahan is a 66 y.o. female who presents today via telemedicine for hospital follow-up after a prolonged admission for multifocal pneumonia and a COPD exacerbation. During that hospital stay she was noted to have an elevated BNP well acutely ill and echocardiogram revealed grade 2 diastolic dysfunction. She was recommended follow-up for heart failure. She has no chest pain but does have a history of diabetes mellitus, COPD, hypertension, oxygen dependence, hepatitis C. She has never previously had any episodes of heart failure. Now that she is been convalescing after her hospital stay she feels steady improvement. She has no other objective symptoms of heart failure. She has no signs of heart failure on telemedicine exam. She remains NYHA class 2-3 related to pulmonary issues.     Denies any other cardiovascular symptoms including chest pain, lightheadedness, syncope or presyncope, lower extremity edema, PND, orthopnea or palpitations.      Past Medical History   Diagnosis Date   • COPD    • ASTHMA    • Hypertension    • Pneumonia      as a child   • Bronchitis      as a child   • Unspecified urinary incontinence    • Fall    • Infectious disease      Hepatitis C   • Arthritis      \"everywhere\"   • Fibromyalgia    • Neuropathy (CMS-[facility])      bilat feet   • Other specified symptom associated with female genital organs      Hysterectomy at age 22yrs   • Diabetes      Type 2   • Hepatitis C      \"I have markers in blood but not active\"   • Congestive heart failure (CMS-HCC) 2013     related to pulmonary failure   • Indigestion    • GERD (gastroesophageal reflux disease)    • Breath shortness      \"only with flare up with allergies\"   • Sleep apnea      does not wear CPAP, \"can't do it\"   • Disorder of thyroid      Hypothyroid   • Backpain      chronic back pain   • Heart burn    • Pain 9/13/2016     \"pain everywhere\"   • Psychiatric problem 9/13/2016     PTSD   • On home oxygen therapy  "   • RLS (restless legs syndrome)    • PND (post-nasal drip)      Past Surgical History   Procedure Laterality Date   • Gyn surgery       hysterectomy    • Other orthopedic surgery       L knee replacement    • Other orthopedic surgery       R carpal tunnel    • Us-needle core bx-breast panel     • Lumpectomy Left      Left breast   • Pr inj dx/ther agnt paravert facet joint, bruce* Left 7/10/2015     Procedure: INJ PARA FACET L/S 1 LVL W/IG - L3-S1;  Surgeon: Xavier Arteaga D.O.;  Location: [facility] ORS;  Service: Pain Management   • Pr inj dx/ther agnt paravert facet joint, bruce*  7/10/2015     Procedure: INJ PARA FACET L/S 2D LVL W/IG;  Surgeon: Xavier Arteaga D.O.;  Location: [facility] ORS;  Service: Pain Management   • Pr inj dx/ther agnt paravert facet joint, bruce*  7/10/2015     Procedure: INJ PARA FACET L/S 3D LVL W/IG;  Surgeon: Xavier Arteaga D.O.;  Location: [facility] ORS;  Service: Pain Management   • Pr inject nerv blck,othr periph nerv  7/10/2015     Procedure: INJ-ANES AGENT-OTHER PHER.NRVE;  Surgeon: Xavier Arteaga D.O.;  Location: [facility] ORS;  Service: Pain Management   • Pr inj dx/ther agnt paravert facet joint, bruce* Left 7/17/2015     Procedure: INJ PARA FACET L/S 1 LVL W/IG - L3-S1;  Surgeon: Xavier Arteaga D.O.;  Location: [facility] ORS;  Service: Pain Management   • Pr inj dx/ther agnt paravert facet joint, bruce*  7/17/2015     Procedure: INJ PARA FACET L/S 2D LVL W/IG;  Surgeon: Xavier Arteaga D.O.;  Location: [facility] ORS;  Service: Pain Management   • Pr inj dx/ther agnt paravert facet joint, bruce*  7/17/2015     Procedure: INJ PARA FACET L/S 3D LVL W/IG;  Surgeon: Xavier Arteaga D.O.;  Location: [facility] ORS;  Service: Pain Management   • Pr inject nerv blck,othr periph nerv  7/17/2015     Procedure: INJ-ANES AGENT-OTHER PHER.NRVE;  Surgeon: Xavier Arteaga D.O.;  Location: SURGERY  [facility] ORS;  Service: Pain Management   • Pr dstr nrolytc agnt parverteb fct sngl lmbr/sacral Left 10/2/2015     Procedure: NEURO DEST FACET L/S W/IG SNGL - L3-S1;  Surgeon: Xavier Arteaga D.O.;  Location: [facility] ORS;  Service: Pain Management   • Pr dstr nrolytc agnt parverteb fct addl lmbr/sacral  10/2/2015     Procedure: NEURO DEST FACET L/S W/IG ADDL;  Surgeon: Xavier Arteaga D.O.;  Location: SURGERY [facility] ORS;  Service: Pain Management   • Pr inject rx other periph nerve  10/2/2015     Procedure: NEUROLYTIC DEST-OTHER NERVE;  Surgeon: Xavier Arteaga D.O.;  Location: [facility];  Service: Pain Management   • Pr dstr nrolytc agnt parverteb fct addl lmbr/sacral  10/2/2015     Procedure: NEURO DEST FACET L/S W/IG ADDL;  Surgeon: Xavier Arteaga D.O.;  Location: SURGERY [facility] ORS;  Service: Pain Management   • Pr dstr nrolytc agnt parverteb fct sngl lmbr/sacral Right 11/6/2015     Procedure: NEURO DEST FACET L/S W/IG SNGL - L3-S1;  Surgeon: Xavier Arteaga D.O.;  Location: [facility] ORS;  Service: Pain Management   • Pr dstr nrolytc agnt parverteb fct addl lmbr/sacral  11/6/2015     Procedure: NEURO DEST FACET L/S W/IG ADDL;  Surgeon: Xavier Arteaga D.O.;  Location: SURGERY [facility] ORS;  Service: Pain Management   • Pr inject rx other periph nerve  11/6/2015     Procedure: NEUROLYTIC DEST-OTHER NERVE;  Surgeon: Xavier Arteaga D.O.;  Location: SURGERY [facility] ORS;  Service: Pain Management   • Pr dstr nrolytc agnt parverteb fct addl lmbr/sacral  11/6/2015     Procedure: NEURO DEST FACET L/S W/IG ADDL;  Surgeon: Xavier Arteaga D.O.;  Location: SURGERY [facility] ORS;  Service: Pain Management   • Pr dstr nrolytc agnt parverteb fct sngl lmbr/sacral Left 9/16/2016     Procedure: NEURO DEST FACET L/S W/IG SNGL - L3-S1;  Surgeon: Xavier Arteaga D.O.;  Location: SURGERY [facility];  Service: Pain  "Management   • Pr dstr nrolytc agnt parverteb fct addl lmbr/sacral  9/16/2016     Procedure: NEURO DEST FACET L/S W/IG ADDL;  Surgeon: Xavier Arteaga D.O.;  Location: SURGERY [facility] ORS;  Service: Pain Management   • Pr dstr nrolytc agnt parverteb fct addl lmbr/sacral  9/16/2016     Procedure: NEURO DEST FACET L/S W/IG ADDL;  Surgeon: Xavier Arteaga D.O.;  Location: SURGERY [facility] ORS;  Service: Pain Management   • Pr fluoroscopic guidance needle placement  9/16/2016     Procedure: FLOURO GUIDE NEEDLE PLACEMENT;  Surgeon: Xavier Arteaga D.O.;  Location: SURGERY [facility] ORS;  Service: Pain Management   • Ankle orif Left 5/8/2017     Procedure: ANKLE ORIF;  Surgeon: Rico Gtz M.D.;  Location: [facility];  Service:      Family History   Problem Relation Age of Onset   • Lung Disease Mother    • Alcohol/Drug Mother    • Heart Disease Mother    • Cancer Father    • Cancer Brother    • Diabetes Maternal Grandmother    • Stroke Paternal Grandmother      History   Smoking status   • Former Smoker -- 1.00 packs/day for 50 years   • Types: Cigarettes   • Quit date: 02/01/2017   Smokeless tobacco   • Never Used     Allergies   Allergen Reactions   • Vitamin C Diarrhea     \"violent diarrhea\"   • Keflex Rash     Severe rash, but TOLERATES PENICILLINS, Rocephin    • Codeine Nausea     Severe stomach pain   • Lyrica Nausea     Nausea, dizzy   • Sudafed Nausea and Unspecified     Chest pain, nausea     Outpatient Encounter Prescriptions as of 8/9/2017   Medication Sig Dispense Refill   • trazodone (DESYREL) 100 MG Tab TAKE 3 TABLETS BY MOUTH NIGHTLY AT BEDTIME AS NEEDED FOR SLEEP 90 Tab 0   • Blood Glucose Monitoring Suppl (FREESTYLE LITE) Device Check blood sugars three times daily. 1 Device 0   • gabapentin (NEURONTIN) 600 MG tablet Take 1 Tab by mouth 3 times a day. 270 Tab 1   • budesonide-formoterol (SYMBICORT) 160-4.5 MCG/ACT Aerosol Inhale 2 Puffs by mouth 2 Times a Day. " 1 Inhaler 11   • nystatin/triamcinolone (MYCOLOG) 265487-5.1 UNIT/GM-% Cream Apply in a thin layer to fungal rash once per day prn 60 g 1   • amlodipine (NORVASC) 10 MG Tab Take 1 Tab by mouth every day. 90 Tab 1   • insulin lispro (HUMALOG) 100 UNIT/ML Solution Inject 3-12 Units as instructed 4 Times a Day,Before Meals and at Bedtime.     • cyanocobalamin (VITAMIN B-12) 1000 MCG/ML Solution 1,000 mcg by Intramuscular route every Saturday.     • omeprazole (PRILOSEC) 40 MG delayed-release capsule Take 1 Cap by mouth every day. 30 Cap    • multivitamin (THERAGRAN) Tab Take 1 Tab by mouth every day.     • Cyanocobalamin (VITAMIN B-12) 1000 MCG Tab Take 1,000 mcg by mouth every evening.     • Cholecalciferol (VITAMIN D3) 2000 UNITS Tab Take 2,000 Units by mouth every day.     • senna-docusate (PERICOLACE OR SENOKOT S) 8.6-50 MG Tab Take 1 Tab by mouth every evening.     • oxycodone immediate release (ROXICODONE) 10 MG immediate release tablet Take 10 mg by mouth every 3 hours as needed for Moderate Pain.     • tiotropium (SPIRIVA) 18 MCG Cap Inhale 1 Cap by mouth every day. 30 Cap 3   • duloxetine (CYMBALTA) 60 MG Cap DR Particles delayed-release capsule Take 1 Cap by mouth every day. 30 Cap 1   • insulin glargine (LANTUS) 100 UNIT/ML Solution Inject 22 Units as instructed every evening.     • levothyroxine (SYNTHROID) 75 MCG Tab TAKE 1 TABLET BY MOUTH DAILY 90 Tab 3   • montelukast (SINGULAIR) 10 MG Tab Take 1 Tab by mouth every day. 90 Tab 3   • carvedilol (COREG) 3.125 MG Tab Take 1 Tab by mouth 2 times a day, with meals. (Patient not taking: Reported on 8/9/2017) 180 Tab 0   • predniSONE (DELTASONE) 20 MG Tab Take 2 Tabs by mouth every day. Take 40 mg for 5 days, then  Take 30 mg for 3 days then  Take 20 mg for 3 days then  Take 10 mg for 2 days then  Take 5 mg for 2 days and then stop. (Patient not taking: Reported on 8/9/2017) 15 Tab 0   • Lactobacillus-Inulin (CULTUREE DIGESTIVE HEALTH PO) Take 1 Tab by mouth  "every day.     • lorazepam (ATIVAN) 0.5 MG Tab Take 0.5 mg by mouth every evening.     • ipratropium-albuterol (COMBIVENT RESPIMAT)  MCG/ACT Aero Soln Inhale 1 Puff by mouth every 6 hours as needed. Uses prn only     • folic acid (FOLVITE) 800 MCG tablet Take 800 mcg by mouth every day.     • docusate sodium (COLACE) 100 MG Cap Take 100 mg by mouth 2 times a day.       No facility-administered encounter medications on file as of 8/9/2017.     Review of Systems   All other systems reviewed and are negative.       Objective:   /84 mmHg  Pulse 71  Ht 1.651 m (5' 5\")  Wt 97.07 kg (214 lb)  BMI 35.61 kg/m2  SpO2 94%    Physical Exam   Constitutional: She is oriented to person, place, and time. She appears well-developed and well-nourished. No distress.   Nasal cannula oxygen   HENT:   Head: Normocephalic and atraumatic.   Mouth/Throat: Oropharynx is clear and moist. No oropharyngeal exudate.   Eyes: Conjunctivae are normal. Pupils are equal, round, and reactive to light. No scleral icterus.   Neck: Normal range of motion. Neck supple. No JVD present. No thyromegaly present.   Cardiovascular: Normal rate, regular rhythm, normal heart sounds and intact distal pulses.  Exam reveals no gallop and no friction rub.    No murmur heard.  Pulses:       Carotid pulses are 2+ on the right side, and 2+ on the left side.       Radial pulses are 2+ on the right side, and 2+ on the left side.        Popliteal pulses are 2+ on the right side, and 2+ on the left side.        Dorsalis pedis pulses are 2+ on the right side, and 2+ on the left side.        Posterior tibial pulses are 2+ on the right side, and 2+ on the left side.   Pulmonary/Chest: Effort normal. She has no wheezes. She has no rales.   Prolonged expiratory phase   Abdominal: Soft. Bowel sounds are normal. She exhibits no distension. There is no tenderness.   Musculoskeletal: She exhibits edema (1+ left greater than right at the site of previous ankle " fracture.). She exhibits no tenderness.   Neurological: She is alert and oriented to person, place, and time. No cranial nerve deficit.   Skin: Skin is warm and dry. No rash noted. She is not diaphoretic. No erythema.   Psychiatric: She has a normal mood and affect. Her behavior is normal.   Vitals reviewed.    ECHO CONCLUSIONS (3/16/2017):  Compared to the prior echocardiogram dated 10/30/2013, the tricuspid   regurgitation jet is poorly visualized otherwise no significant changes   are noted.    1. Left ventricular ejection fraction is visually estimated to be 60%.   Normal regional wall motion. Grade II diastolic dysfunction.    2. Estimated right ventricular systolic pressure  is 60 mmHg.  Right   heart pressures are consistent with moderate pulmonary hypertension.     3. No significant valvular heart disease.    4. Estimated right atrial pressure is 8 mmHg.    EKG (7/29/2017):  I have personally reviewed the EKG this visit and discussed with the patient. It shows PROBABLE LEFT ATRIAL ABNORMALITY   BORDERLINE LOW VOLTAGE IN FRONTAL LEADS   ABNORMAL T, CONSIDER ISCHEMIA, LATERAL LEADS     Assessment:     1. Pulmonary hypertension (CMS-HCC)      Mixed group 2 and 3   2. Pulmonary emphysema, unspecified emphysema type (CMS-HCC)     3. Diastolic dysfunction      Grade 2   4. EDITH (obstructive sleep apnea)     5. Uncontrolled type 2 diabetes mellitus with stage 3 chronic kidney disease, with long-term current use of insulin (CMS-HCC)     6. CKD (chronic kidney disease), stage II     7. Chronic hypertension     8. Morbid obesity due to excess calories (CMS-HCC)         Medical Decision Making:  Today's Assessment / Status / Plan:     She is recovering as expected with a prolonged recovery course after such a long hospital stay. She was acutely ill and septic at the time of her evaluation for heart failure and has never prior to this had an episode. She does have likely some diastolic heart failure which is well  controlled as long she is not concurrently ill with pulmonary issues. Her pulmonary hypertension is related to diastolic dysfunction sleep apnea and emphysema. This requires treatment of the underlying conditions and maintenance of bulimia with when necessary diuretics. She also has mild plaquing on carotid ultrasound that had been done and aortic atherosclerosis on CT and therefore she would benefit from management with a statin. She is unsure if she is on a statin and does not listen on her medicines. She will call with these.    Recommendations:    1. Statin therapy  2. Follow-up echocardiogram in 1 year to reassess pulmonary pressures as she has recovered  3. Maintenance of euvolemia, diet and exercise.  4. Aspirin 81 mg daily, discussed with patient    We discussed that the heart failure is related to sleep apnea long-standing diabetes and hypertension but that it is currently not giving her any issues unless she is concomitantly ill with another process. Therefore treatment of the underlying factors is what is required at this time. We also discussed her pulmonary pressures which are secondary to COPD sleep apnea and diastolic heart failure.

## 2017-08-10 ENCOUNTER — TELEPHONE (OUTPATIENT)
Dept: MEDICAL GROUP | Facility: PHYSICIAN GROUP | Age: 67
End: 2017-08-10

## 2017-08-10 RX ORDER — TRAZODONE HYDROCHLORIDE 150 MG/1
300 TABLET ORAL
Qty: 60 TAB | Refills: 5 | Status: SHIPPED | OUTPATIENT
Start: 2017-08-10 | End: 2017-09-28

## 2017-08-10 RX ORDER — HYDROCHLOROTHIAZIDE 25 MG/1
25 TABLET ORAL DAILY
Qty: 30 TAB | Refills: 5 | Status: SHIPPED | OUTPATIENT
Start: 2017-08-10 | End: 2017-09-28

## 2017-08-10 RX ORDER — ESCITALOPRAM OXALATE 10 MG/1
10 TABLET ORAL DAILY
Qty: 30 TAB | Refills: 5 | Status: SHIPPED | OUTPATIENT
Start: 2017-08-10 | End: 2017-09-28

## 2017-08-10 NOTE — PROGRESS NOTES
Priya was discharged from [facility] to home on 7/3/17 with a diagnosis of acute chronic hypoxemic respiratory failure and a LACE score of 67.  She was instructed to follow up with her PCP in 1-2 weeks however, the patient does not have a PCP.  [facility] Patient Advocate successfully engaged with the patient on several occasions and confirmed that [facility] resumed service on 7/9/17 and all medications were filled without incident.  [facility] Patient advocate suggested that the patient follow up with the discharge clinic however, the patient declined.  The following future appts are scheduled;  Dr. Gracia (cardio) 8/9/17, Dr. Olea 8/15/17 (onc), Dr. Roberson (PCP) 8/29/17, Dr. Burns 11/2/17.

## 2017-08-10 NOTE — TELEPHONE ENCOUNTER
Cristela with [place] informing Priya dropped her O2 tank on her left foot last week and then on her RT foot today. Cristela also stating Priya very confused with some of her BP medications.   I spoke with Priya and scheduled appointment. Asked her to please bring all her medications with her. Informed Priya she should come into UC for her feet to be checked. Priya has refused UC visit, will wait for Dr Mccall.  Spoke with Cristela with [facility] [phone], informed her of plan with Priya and she agreed.

## 2017-08-15 ENCOUNTER — HOSPITAL ENCOUNTER (OUTPATIENT)
Facility: MEDICAL CENTER | Age: 67
End: 2017-08-15
Attending: INTERNAL MEDICINE
Payer: MEDICARE

## 2017-08-15 ENCOUNTER — OFFICE VISIT (OUTPATIENT)
Dept: HEMATOLOGY ONCOLOGY | Facility: MEDICAL CENTER | Age: 67
End: 2017-08-15
Payer: MEDICARE

## 2017-08-15 ENCOUNTER — NON-PROVIDER VISIT (OUTPATIENT)
Dept: HEMATOLOGY ONCOLOGY | Facility: MEDICAL CENTER | Age: 67
End: 2017-08-15
Payer: MEDICARE

## 2017-08-15 VITALS
OXYGEN SATURATION: 91 % | WEIGHT: 216.05 LBS | DIASTOLIC BLOOD PRESSURE: 52 MMHG | HEIGHT: 65 IN | BODY MASS INDEX: 36 KG/M2 | TEMPERATURE: 98.1 F | RESPIRATION RATE: 18 BRPM | HEART RATE: 71 BPM | SYSTOLIC BLOOD PRESSURE: 138 MMHG

## 2017-08-15 DIAGNOSIS — D50.9 IRON DEFICIENCY ANEMIA, UNSPECIFIED IRON DEFICIENCY ANEMIA TYPE: ICD-10-CM

## 2017-08-15 LAB
BASOPHILS # BLD AUTO: 0.8 % (ref 0–1.8)
BASOPHILS # BLD: 0.09 K/UL (ref 0–0.12)
EOSINOPHIL # BLD AUTO: 0.24 K/UL (ref 0–0.51)
EOSINOPHIL NFR BLD: 2.2 % (ref 0–6.9)
ERYTHROCYTE [DISTWIDTH] IN BLOOD BY AUTOMATED COUNT: 55 FL (ref 35.9–50)
ERYTHROCYTE [SEDIMENTATION RATE] IN BLOOD BY WESTERGREN METHOD: 111 MM/HOUR (ref 0–30)
FERRITIN SERPL-MCNC: 359.8 NG/ML (ref 10–291)
HCT VFR BLD AUTO: 32.5 % (ref 37–47)
HGB BLD-MCNC: 10.2 G/DL (ref 12–16)
HGB RETIC QN AUTO: 31.2 PG/CELL (ref 29–35)
IMM GRANULOCYTES # BLD AUTO: 0.2 K/UL (ref 0–0.11)
IMM GRANULOCYTES NFR BLD AUTO: 1.8 % (ref 0–0.9)
IMM RETICS NFR: 30.3 % (ref 9.3–17.4)
IRON SATN MFR SERPL: 21 % (ref 15–55)
IRON SERPL-MCNC: 51 UG/DL (ref 40–170)
LDH SERPL-CCNC: 182 U/L (ref 107–266)
LYMPHOCYTES # BLD AUTO: 1.63 K/UL (ref 1–4.8)
LYMPHOCYTES NFR BLD: 14.8 % (ref 22–41)
MCH RBC QN AUTO: 29.1 PG (ref 27–33)
MCHC RBC AUTO-ENTMCNC: 31.4 G/DL (ref 33.6–35)
MCV RBC AUTO: 92.9 FL (ref 81.4–97.8)
MONOCYTES # BLD AUTO: 0.71 K/UL (ref 0–0.85)
MONOCYTES NFR BLD AUTO: 6.4 % (ref 0–13.4)
NEUTROPHILS # BLD AUTO: 8.15 K/UL (ref 2–7.15)
NEUTROPHILS NFR BLD: 74 % (ref 44–72)
NRBC # BLD AUTO: 0 K/UL
NRBC BLD AUTO-RTO: 0 /100 WBC
PLATELET # BLD AUTO: 273 K/UL (ref 164–446)
PMV BLD AUTO: 8.4 FL (ref 9–12.9)
RBC # BLD AUTO: 3.5 M/UL (ref 4.2–5.4)
RETICS # AUTO: 0.1 M/UL (ref 0.04–0.06)
RETICS/RBC NFR: 2.8 % (ref 0.8–2.1)
TIBC SERPL-MCNC: 245 UG/DL (ref 250–450)
WBC # BLD AUTO: 11 K/UL (ref 4.8–10.8)

## 2017-08-15 PROCEDURE — 83615 LACTATE (LD) (LDH) ENZYME: CPT

## 2017-08-15 PROCEDURE — 99204 OFFICE O/P NEW MOD 45 MIN: CPT | Performed by: INTERNAL MEDICINE

## 2017-08-15 PROCEDURE — 85025 COMPLETE CBC W/AUTO DIFF WBC: CPT

## 2017-08-15 PROCEDURE — 83540 ASSAY OF IRON: CPT

## 2017-08-15 PROCEDURE — 85046 RETICYTE/HGB CONCENTRATE: CPT

## 2017-08-15 PROCEDURE — 82728 ASSAY OF FERRITIN: CPT

## 2017-08-15 PROCEDURE — 36415 COLL VENOUS BLD VENIPUNCTURE: CPT | Performed by: INTERNAL MEDICINE

## 2017-08-15 PROCEDURE — 84160 ASSAY OF PROTEIN ANY SOURCE: CPT

## 2017-08-15 PROCEDURE — 85652 RBC SED RATE AUTOMATED: CPT

## 2017-08-15 PROCEDURE — 83550 IRON BINDING TEST: CPT

## 2017-08-15 PROCEDURE — 84165 PROTEIN E-PHORESIS SERUM: CPT

## 2017-08-15 RX ORDER — CYCLOBENZAPRINE HCL 10 MG
TABLET ORAL
COMMUNITY
Start: 2017-07-24 | End: 2017-09-19 | Stop reason: SDUPTHER

## 2017-08-15 ASSESSMENT — PAIN SCALES - GENERAL: PAINLEVEL: 7=MODERATE-SEVERE PAIN

## 2017-08-15 NOTE — MR AVS SNAPSHOT
"        Priya Callahan   8/15/2017 3:45 PM   Non-Provider Visit   MRN: [id number]    Department:  [facility]   Dept Phone:  [phone]    Description:  Female : 1950   Provider:  [facility] MA 1           Reason for Visit     Labs Only           Allergies as of 8/15/2017     Allergen Noted Reactions    Vitamin C 10/29/2013   Diarrhea    \"violent diarrhea\"    Keflex 10/29/2013   Rash    Severe rash, but TOLERATES PENICILLINS, Rocephin     Codeine 10/29/2013   Nausea    Severe stomach pain    Lyrica 10/29/2013   Nausea    Nausea, dizzy    Sudafed 10/29/2013   Nausea, Unspecified    Chest pain, nausea      You were diagnosed with     Iron deficiency anemia, unspecified iron deficiency anemia type   [[id number]]         Vital Signs     Smoking Status                   Former Smoker           Basic Information     Date Of Birth Sex Race Ethnicity Preferred Language    1950 Female White Non- English      Your appointments     Aug 23, 2017  1:20 PM   Established Patient with Kavitha Mccall M.D.   [facility] [address] [address] [phone]           You will be receiving a confirmation call a few days before your appointment from our automated call confirmation system.            Aug 29, 2017  1:20 PM   NEW TO YOU with Kavitha Mccall M.D.   [facility] [address] [address] [phone]            Sep 05, 2017  1:30 PM   BONE DENSITY (DEXASCAN) with [facility] BD 1   [facility] BRE[facility] ([address])    [address] [place] [address]   [phone]           No calcium supplements 24 hours prior to exam, including antacids or multivitamins w/calcium.  Pt may eat and drink normally.  No injection procedures prior to Dexa on the same day.  No barium contrast, no CTs with IV or oral contrast, no Pet/CTs and no Nuc Med injections for 7 days prior to a Dexa " (including Barium Swallow, Upper GI, Small Bowel Series).  If scheduling a Dexa on the same day as a CT with IV or oral contrast, any test with barium, Pet/CT or a Nuc Med with injection, always schedule the Dexa before the other study.            Nov 02, 2017  2:00 PM   New Patient with Vito Burns M.D.   [facility] (--)    [address], [address]  [facility] [address]   [phone]           Please bring Photo ID, Insurance Cards, All Medication Bottles and copies of any legal documents (such as Living Will, Power of ) If speaking a language besides English please bring an adult . Please arrive 30 minutes prior for check in and registration. You will be receiving a confirmation call a few days before your appointment from our automated call confirmation system.            Dec 21, 2017  2:00 PM   Hematology Est Patient with Brian Olea M.D.   [facility] (--)    [address], [address]  [facility] [address]   [phone]              Problem List              ICD-10-CM Priority Class Noted - Resolved    Morbid obesity (CMS-[facility]) (Chronic) E66.01 Low  10/30/2013 - Present    DM type 2, uncontrolled, with renal complications (CMS-HCC) (Chronic) E11.29, E11.65 Medium  10/30/2013 - Present    Chronic pain G89.29 Low  10/30/2013 - Present    Iron deficiency anemia D50.9 Medium  10/30/2013 - Present    Acute on chronic diastolic CHF (congestive heart failure), NYHA class 1 (CMS-HCC) I50.33   11/20/2013 - Present    Major depressive disorder (CMS-[facility]) (Chronic) F32.9 Low  11/20/2013 - Present    COPD (chronic obstructive pulmonary disease) (CMS-[facility]) (Chronic) J44.9 High  11/20/2013 - Present    HTN (hypertension) I10 Low  11/20/2013 - Present    Chronic kidney disease, stage 3 N18.3 Low  1/6/2014 - Present    Chronic knee pain M25.569, G89.29   2/25/2014 - Present    Osteoarthritis M19.90 Low  2/25/2014 - Present    Insomnia G47.00 Low  2/25/2014 - Present     Hair loss L65.9   2/25/2014 - Present    Hypothyroidism (Chronic) E03.9 Low  4/1/2014 - Present    Vitamin D deficiency (Chronic) E55.9 Low  6/12/2014 - Present    Lumbar spondylosis with myelopathy M47.16   6/17/2014 - Present    Nocturnal hypoxia G47.34   7/21/2014 - Present    Pain of right heel M79.671   7/21/2014 - Present    Chronic pain syndrome (Chronic) G89.4 Low  8/28/2014 - Present    EDITH (obstructive sleep apnea) (Chronic) G47.33 Low  1/27/2015 - Present    Hypertensive heart disease with heart failure (CMS-HCC) (Chronic) I11.0 High  4/27/2015 - Present    Chronic respiratory failure (CMS-HCC) J96.10 Low  4/28/2015 - Present    Chronic constipation K59.09   7/7/2015 - Present    Pulmonary nodules R91.8   7/7/2015 - Present    Lumbosacral spondylosis without myelopathy M47.817   7/10/2015 - Present    Osteoarthritis, knee M17.10   9/2/2015 - Present    Osteoarthritis of spine with radiculopathy, lumbar region M47.26   10/2/2015 - Present    Bilateral hand pain M79.641, M79.642   12/21/2015 - Present    Primary osteoarthritis of both hands M19.041, M19.042   12/21/2015 - Present    Gastroesophageal reflux disease with esophagitis K21.0 Low  12/22/2015 - Present    Myoclonic jerking G25.3   4/12/2016 - Present    Cough R05   8/18/2016 - Present    Other spondylosis with radiculopathy, lumbar region M47.26   9/16/2016 - Present    Tobacco abuse disorder Z72.0   9/27/2016 - Present    Toenail fungus B35.1   11/21/2016 - Present    Septic shock (CMS-HCC) A41.9, R65.21 High  3/7/2017 - Present    Hyponatremia E87.1   3/8/2017 - Present    Hyperkalemia E87.5   3/8/2017 - Present    Acute on chronic renal failure (CMS-HCC) N17.9, N18.9   3/8/2017 - Present    Acute on chronic respiratory failure (CMS-HCC) J96.20   3/8/2017 - Present    Severe sepsis with septic shock (CMS-HCC) A41.9, R65.21   3/8/2017 - Present    Diabetes mellitus, type II (CMS-HCC) (Chronic) E11.9 Low  4/4/2017 - Present    HAN (acute kidney  injury) (CMS-HCC) N17.9 Medium  4/4/2017 - Present    CKD (chronic kidney disease), stage II (Chronic) N18.2 Medium  4/4/2017 - Present    HLD (hyperlipidemia) (Chronic) E78.5 Low  4/4/2017 - Present    Encephalopathy G93.40 High  4/4/2017 - Present    Neuropathy (CMS-HCC) G62.9 Low  4/4/2017 - Present    Leukocytosis D72.829 Low  4/4/2017 - Present    Hyperbilirubinemia E80.6 Medium  4/4/2017 - Present    Sepsis (CMS-HCC) A41.9   4/25/2017 - Present    Elevated troponin R74.8   4/25/2017 - Present    CAP (community acquired pneumonia) J18.9   4/26/2017 - Present    Tibia/fibula fracture (Chronic) S82.209A, S82.409A High  5/7/2017 - Present    Pneumonia J18.9 Medium  5/8/2017 - Present    Acute exacerbation of chronic obstructive pulmonary disease (COPD) (CMS-HCC) J44.1 Medium  5/8/2017 - Present    Anemia of chronic disease (Chronic) D63.8   5/27/2017 - Present    Anemia requiring transfusions D64.9   5/27/2017 - Present    Hypoxia R09.02   6/5/2017 - Present    Acute respiratory failure requiring reintubation (CMS-HCC)  (COPD exacerbation, infiltrates due to infectious vs inflammatory pneumonia?)  J96.00 High  6/22/2017 - Present    Chronic hypertension (Chronic) I10 Medium  6/30/2017 - Present    Atrial fibrillation (CMS-HCC) (Chronic) I48.91 High  6/30/2017 - Present    DM2 (diabetes mellitus, type 2) (CMS-HCC) (Chronic) E11.9 Low  6/30/2017 - Present    Thyroiditis E06.9 Low  6/30/2017 - Present    Severe sepsis (CMS-HCC) (Pneumonia, acute respiratory failure)  A41.9, R65.20 High  6/30/2017 - Present      Health Maintenance        Date Due Completion Dates    RETINAL SCREENING 9/26/1967 ---    IMM DTaP/Tdap/Td Vaccine (1 - Tdap) 9/26/1968 ---    IMM ZOSTER VACCINE 9/26/2009 ---    BONE DENSITY 9/26/2014 ---    MAMMOGRAM 2/17/2016 2/17/2015    DIABETES MONOFILAMENT / LE EXAM 6/7/2017 6/7/2016    IMM INFLUENZA (1) 9/1/2017 9/27/2016, 11/11/2013    A1C SCREENING 12/21/2017 6/21/2017, 3/7/2017, 1/26/2017,  5/27/2016, 12/17/2015, 8/11/2015, 2/24/2015, 6/2/2014, 8/31/2012    URINE ACR / MICROALBUMIN 1/26/2018 1/26/2017, 12/17/2015, 2/24/2015, 8/31/2012    COLON CANCER SCREENING ANNUAL FIT 4/3/2018 4/3/2017, 12/28/2015, 3/7/2015    FASTING LIPID PROFILE 5/15/2018 5/15/2017, 3/8/2017, 1/26/2017, 12/17/2015, 2/24/2015, 6/2/2014, 8/31/2012    SERUM CREATININE 7/3/2018 7/3/2017, 7/2/2017, 6/30/2017, 6/30/2017, 6/29/2017, 6/28/2017, 6/27/2017, 6/26/2017, 6/25/2017, 6/24/2017, 6/23/2017, 6/22/2017, 6/21/2017, 6/20/2017, 6/18/2017, 6/17/2017, 6/15/2017, 6/14/2017, 6/12/2017, 6/10/2017, 6/9/2017, 6/8/2017, 6/7/2017, 6/6/2017, 6/5/2017, 6/4/2017, 6/3/2017, 6/2/2017, 5/31/2017, 5/27/2017, 5/23/2017, 5/21/2017, 5/19/2017, 5/11/2017, 5/10/2017, 5/9/2017, 5/8/2017, 5/7/2017, 5/3/2017, 5/1/2017, 4/30/2017, 4/29/2017, 4/28/2017, 4/26/2017, 4/25/2017, 4/24/2017, 4/9/2017, 4/8/2017, 4/6/2017, 4/5/2017, 4/2/2017, 4/1/2017, 3/17/2017, 3/16/2017, 3/15/2017, 3/14/2017, 3/13/2017, 3/12/2017, 3/11/2017, 3/10/2017, 3/9/2017, 3/8/2017, 3/7/2017, 1/26/2017, 5/27/2016, 12/17/2015, 8/11/2015, 5/8/2015, 3/19/2015, 2/27/2015, 2/24/2015, 6/2/2014, 1/13/2014, 11/22/2013, 11/21/2013, 11/20/2013, 11/19/2013, 11/14/2013, 11/13/2013, 11/12/2013, 11/11/2013, 11/10/2013, 11/9/2013, 11/8/2013, 11/7/2013, 11/6/2013, 11/5/2013, 11/4/2013, 11/3/2013, 11/2/2013, 11/1/2013, 10/31/2013, 10/30/2013, 10/29/2013, 8/31/2012            Current Immunizations     13-VALENT PCV PREVNAR 9/28/2016    Influenza TIV (IM) 9/27/2016, 11/11/2013 11:45 AM    Pneumococcal polysaccharide vaccine (PPSV-23) 12/11/2014, 10/22/2013      Below and/or attached are the medications your provider expects you to take. Review all of your home medications and newly ordered medications with your provider and/or pharmacist. Follow medication instructions as directed by your provider and/or pharmacist. Please keep your medication list with you and share with your provider. Update the information  when medications are discontinued, doses are changed, or new medications (including over-the-counter products) are added; and carry medication information at all times in the event of emergency situations     Allergies:  VITAMIN C - Diarrhea     KEFLEX - Rash     CODEINE - Nausea     LYRICA - Nausea     SUDAFED - Nausea,Unspecified               Medications  Valid as of: August 15, 2017 -  3:54 PM    Generic Name Brand Name Tablet Size Instructions for use    AmLODIPine Besylate (Tab) NORVASC 10 MG Take 1 Tab by mouth every day.        Blood Glucose Monitoring Suppl (Device) FREESTYLE LITE  Check blood sugars three times daily.        Budesonide-Formoterol Fumarate (Aerosol) SYMBICORT 160-4.5 MCG/ACT Inhale 2 Puffs by mouth 2 Times a Day.        Carvedilol (Tab) COREG 3.125 MG Take 1 Tab by mouth 2 times a day, with meals.        Cholecalciferol (Tab) Vitamin D3 2000 UNITS Take 2,000 Units by mouth every day.        Cyanocobalamin (Tab) vitamin B-12 1000 MCG Take 1,000 mcg by mouth every evening.        Cyanocobalamin (Solution) VITAMIN B-12 1000 MCG/ML 1,000 mcg by Intramuscular route every Saturday.        Cyclobenzaprine HCl (Tab) FLEXERIL 10 MG         Docusate Sodium (Cap) COLACE 100 MG Take 100 mg by mouth 2 times a day.        DULoxetine HCl (Cap DR Particles) CYMBALTA 60 MG Take 1 Cap by mouth every day.        Escitalopram Oxalate (Tab) LEXAPRO 10 MG Take 1 Tab by mouth every day.        Fluticasone-Salmeterol (AEROSOL POWDER, BREATH ACTIVATED) ADVAIR DISKUS 500-50 MCG/DOSE         Folic Acid (Tab) FOLVITE 800 MCG Take 800 mcg by mouth every day.        Gabapentin (Tab) NEURONTIN 600 MG Take 1 Tab by mouth 3 times a day.        HydroCHLOROthiazide (Tab) HYDRODIURIL 25 MG Take 1 Tab by mouth every day.        Insulin Glargine (Solution) LANTUS 100 UNIT/ML Inject 22 Units as instructed every evening.        Insulin Lispro (Solution) HUMALOG 100 UNIT/ML Inject 3-12 Units as instructed 4 Times a Day,Before  Meals and at Bedtime.        Ipratropium-Albuterol (Aero Soln) COMBIVENT RESPIMAT  MCG/ACT Inhale 1 Puff by mouth every 6 hours as needed. Uses prn only        Lactobacillus-Inulin   Take 1 Tab by mouth every day.        Levothyroxine Sodium (Tab) SYNTHROID 75 MCG TAKE 1 TABLET BY MOUTH DAILY        LORazepam (Tab) ATIVAN 0.5 MG Take 0.5 mg by mouth every evening.        Montelukast Sodium (Tab) SINGULAIR 10 MG Take 1 Tab by mouth every day.        Multiple Vitamin (Tab) THERAGRAN  Take 1 Tab by mouth every day.        Nystatin-Triamcinolone (Cream) MYCOLOG 059800-2.1 UNIT/GM-% Apply in a thin layer to fungal rash once per day prn        Omeprazole (CAPSULE DELAYED RELEASE) PRILOSEC 40 MG Take 1 Cap by mouth every day.        OxyCODONE HCl (Tab) ROXICODONE 10 MG Take 10 mg by mouth every 3 hours as needed for Moderate Pain.        PredniSONE (Tab) DELTASONE 20 MG Take 2 Tabs by mouth every day. Take 40 mg for 5 days, then  Take 30 mg for 3 days then  Take 20 mg for 3 days then  Take 10 mg for 2 days then  Take 5 mg for 2 days and then stop.        Sennosides-Docusate Sodium (Tab) PERICOLACE or SENOKOT S 8.6-50 MG Take 1 Tab by mouth every evening.        Tiotropium Bromide Monohydrate (Cap) SPIRIVA 18 MCG Inhale 1 Cap by mouth every day.        TraZODone HCl (Tab) DESYREL 150 MG Take 2 Tabs by mouth every bedtime.        TraZODone HCl (Tab) DESYREL 100 MG TAKE 3 TABLETS BY MOUTH NIGHTLY AT BEDTIME AS NEEDED FOR SLEEP        .                 Medicines prescribed today were sent to:     [facility] - [address] [facility]    [address] [place] [id number]    Phone: [phone] Fax: [phone]    Open 24 Hours?: No      Medication refill instructions:       If your prescription bottle indicates you have medication refills left, it is not necessary to call your provider’s office. Please contact your pharmacy and they will refill your medication.    If your prescription bottle indicates you do not  have any refills left, you may request refills at any time through one of the following ways: The online [employer] system (except Urgent Care), by calling your provider’s office, or by asking your pharmacy to contact your provider’s office with a refill request. Medication refills are processed only during regular business hours and may not be available until the next business day. Your provider may request additional information or to have a follow-up visit with you prior to refilling your medication.   *Please Note: Medication refills are assigned a new Rx number when refilled electronically. Your pharmacy may indicate that no refills were authorized even though a new prescription for the same medication is available at the pharmacy. Please request the medicine by name with the pharmacy before contacting your provider for a refill.        Other Notes About Your Plan     On pain contract with Dr. Lawson  Last UDS: 3/26/2015 Dr Lawson  Contolled Substance agreement signed: 8/28/2014    Comprehensive Medication Review: appt 11.18.2014 in [facility] Status: Patient Declined

## 2017-08-15 NOTE — PROGRESS NOTES
"Consult Note: Hematology    Date of consultation: 8/15/2017 2:33 PM    Referring provider: Lashay Burrows.      Reason for consultation: Anemia.       History of presenting illness:     Dear Lashay ,    Thank you very much for allowing me to see  Priya Callahan today . As you know she  is a 66 y.o. year old female with significant history of COPD who had a prolonged hospital admission back in July 2017. She presented with respiratory failure requiring ICU stay. CT chest showed no pulmonary embolism. She had bilateral lung opacities with some mild mediastinal and hilar adenopathy. She had significant anemia during the hospitalization and received around 3-4 units of packed red blood cells. She had gradual recovery. She is currently feeling better. She is here for evaluation of anemia. Review of prior CBCs have shown baseline hemoglobin around 10-12 g/dL with significant worsening of anemia during hospitalizations.    Past Medical History:    Past Medical History   Diagnosis Date   • COPD    • ASTHMA    • Hypertension    • Pneumonia      as a child   • Bronchitis      as a child   • Unspecified urinary incontinence    • Fall    • Infectious disease      Hepatitis C   • Arthritis      \"everywhere\"   • Fibromyalgia    • Neuropathy (CMS-[facility])      bilat feet   • Other specified symptom associated with female genital organs      Hysterectomy at age 22yrs   • Diabetes      Type 2   • Hepatitis C      \"I have markers in blood but not active\"   • Congestive heart failure (CMS-[facility]) 2013     related to pulmonary failure   • Indigestion    • GERD (gastroesophageal reflux disease)    • Breath shortness      \"only with flare up with allergies\"   • Sleep apnea      does not wear CPAP, \"can't do it\"   • Disorder of thyroid      Hypothyroid   • Backpain      chronic back pain   • Heart burn    • Pain 9/13/2016     \"pain everywhere\"   • Psychiatric problem 9/13/2016     PTSD   • On home oxygen therapy    • RLS (restless " legs syndrome)    • PND (post-nasal drip)        Past surgical history:    Past Surgical History   Procedure Laterality Date   • Gyn surgery       hysterectomy    • Other orthopedic surgery       L knee replacement    • Other orthopedic surgery       R carpal tunnel    • Us-needle core bx-breast panel     • Lumpectomy Left      Left breast   • Pr inj dx/ther agnt paravert facet joint, bruce* Left 7/10/2015     Procedure: INJ PARA FACET L/S 1 LVL W/IG - L3-S1;  Surgeon: Xavier Arteaga D.O.;  Location: [facility];  Service: Pain Management   • Pr inj dx/ther agnt paravert facet joint, bruce*  7/10/2015     Procedure: INJ PARA FACET L/S 2D LVL W/IG;  Surgeon: Xavier Arteaga D.O.;  Location: [facility];  Service: Pain Management   • Pr inj dx/ther agnt paravert facet joint, bruce*  7/10/2015     Procedure: INJ PARA FACET L/S 3D LVL W/IG;  Surgeon: Xavier Arteaga D.O.;  Location: [facility];  Service: Pain Management   • Pr inject nerv blck,othr periph nerv  7/10/2015     Procedure: INJ-ANES AGENT-OTHER PHER.NRVE;  Surgeon: Xavier Arteaga D.O.;  Location: SURGERY [facility];  Service: Pain Management   • Pr inj dx/ther agnt paravert facet joint, bruce* Left 7/17/2015     Procedure: INJ PARA FACET L/S 1 LVL W/IG - L3-S1;  Surgeon: Xavier Arteaga D.O.;  Location: [facility];  Service: Pain Management   • Pr inj dx/ther agnt paravert facet joint, bruce*  7/17/2015     Procedure: INJ PARA FACET L/S 2D LVL W/IG;  Surgeon: Xavier Arteaga D.O.;  Location: SURGERY [facility];  Service: Pain Management   • Pr inj dx/ther agnt paravert facet joint, bruce*  7/17/2015     Procedure: INJ PARA FACET L/S 3D LVL W/IG;  Surgeon: Xavier Arteaga D.O.;  Location: SURGERY [facility];  Service: Pain Management   • Pr inject nerv blck,othr periph nerv  7/17/2015     Procedure: INJ-ANES AGENT-OTHER PHER.NRVE;  Surgeon: Xavier Arteaga D.O.;  Location:  SURGERY [facility];  Service: Pain Management   • Pr dstr nrolytc agnt parverteb fct sngl lmbr/sacral Left 10/2/2015     Procedure: NEURO DEST FACET L/S W/IG SNGL - L3-S1;  Surgeon: Xavier Arteaga D.O.;  Location: [facility];  Service: Pain Management   • Pr dstr nrolytc agnt parverteb fct addl lmbr/sacral  10/2/2015     Procedure: NEURO DEST FACET L/S W/IG ADDL;  Surgeon: Xavier Arteaga D.O.;  Location: [facility];  Service: Pain Management   • Pr inject rx other periph nerve  10/2/2015     Procedure: NEUROLYTIC DEST-OTHER NERVE;  Surgeon: Xavier Arteaga D.O.;  Location: [facility];  Service: Pain Management   • Pr dstr nrolytc agnt parverteb fct addl lmbr/sacral  10/2/2015     Procedure: NEURO DEST FACET L/S W/IG ADDL;  Surgeon: Xavier Arteaga D.O.;  Location: [facility];  Service: Pain Management   • Pr dstr nrolytc agnt parverteb fct sngl lmbr/sacral Right 11/6/2015     Procedure: NEURO DEST FACET L/S W/IG SNGL - L3-S1;  Surgeon: Xavier Arteaga D.O.;  Location: [facility];  Service: Pain Management   • Pr dstr nrolytc agnt parverteb fct addl lmbr/sacral  11/6/2015     Procedure: NEURO DEST FACET L/S W/IG ADDL;  Surgeon: Xavier Arteaga D.O.;  Location: [facility];  Service: Pain Management   • Pr inject rx other periph nerve  11/6/2015     Procedure: NEUROLYTIC DEST-OTHER NERVE;  Surgeon: Xavier Arteaga D.O.;  Location: SURGERY [facility];  Service: Pain Management   • Pr dstr nrolytc agnt parverteb fct addl lmbr/sacral  11/6/2015     Procedure: NEURO DEST FACET L/S W/IG ADDL;  Surgeon: Xavier Arteaga D.O.;  Location: [facility];  Service: Pain Management   • Pr dstr nrolytc agnt parverteb fct sngl lmbr/sacral Left 9/16/2016     Procedure: NEURO DEST FACET L/S W/IG SNGL - L3-S1;  Surgeon: Xavier Arteaga D.O.;  Location: [facility]ERY [facility];  Service: Pain  Management   • Pr dstr nrolytc agnt parverteb fct addl lmbr/sacral  9/16/2016     Procedure: NEURO DEST FACET L/S W/IG ADDL;  Surgeon: Xavier Arteaga D.O.;  Location: SURGERY [facility];  Service: Pain Management   • Pr dstr nrolytc agnt parverteb fct addl lmbr/sacral  9/16/2016     Procedure: NEURO DEST FACET L/S W/IG ADDL;  Surgeon: Xavier Arteaga D.O.;  Location: SURGERY [facility];  Service: Pain Management   • Pr fluoroscopic guidance needle placement  9/16/2016     Procedure: FLOURO GUIDE NEEDLE PLACEMENT;  Surgeon: Xavier Arteaga D.O.;  Location: SURGERY [facility];  Service: Pain Management   • Ankle orif Left 5/8/2017     Procedure: ANKLE ORIF;  Surgeon: Rico Gtz M.D.;  Location: [facility];  Service:        Allergies:  Vitamin c; Keflex; Codeine; Lyrica; and Sudafed    Medications:    Current Outpatient Prescriptions   Medication Sig Dispense Refill   • ADVAIR DISKUS 500-50 MCG/DOSE AEROSOL POWDER, BREATH ACTIVATED      • cyclobenzaprine (FLEXERIL) 10 MG Tab      • hydrochlorothiazide (HYDRODIURIL) 25 MG Tab Take 1 Tab by mouth every day. 30 Tab 5   • escitalopram (LEXAPRO) 10 MG Tab Take 1 Tab by mouth every day. 30 Tab 5   • trazodone (DESYREL) 100 MG Tab TAKE 3 TABLETS BY MOUTH NIGHTLY AT BEDTIME AS NEEDED FOR SLEEP 90 Tab 0   • Blood Glucose Monitoring Suppl (FREESTYLE LITE) Device Check blood sugars three times daily. 1 Device 0   • gabapentin (NEURONTIN) 600 MG tablet Take 1 Tab by mouth 3 times a day. 270 Tab 1   • budesonide-formoterol (SYMBICORT) 160-4.5 MCG/ACT Aerosol Inhale 2 Puffs by mouth 2 Times a Day. 1 Inhaler 11   • nystatin/triamcinolone (MYCOLOG) 241351-8.1 UNIT/GM-% Cream Apply in a thin layer to fungal rash once per day prn 60 g 1   • amlodipine (NORVASC) 10 MG Tab Take 1 Tab by mouth every day. 90 Tab 1   • carvedilol (COREG) 3.125 MG Tab Take 1 Tab by mouth 2 times a day, with meals. 180 Tab 0   • lorazepam (ATIVAN) 0.5 MG Tab  Take 0.5 mg by mouth every evening.     • omeprazole (PRILOSEC) 40 MG delayed-release capsule Take 1 Cap by mouth every day. 30 Cap    • multivitamin (THERAGRAN) Tab Take 1 Tab by mouth every day.     • Cyanocobalamin (VITAMIN B-12) 1000 MCG Tab Take 1,000 mcg by mouth every evening.     • Cholecalciferol (VITAMIN D3) 2000 UNITS Tab Take 2,000 Units by mouth every day.     • oxycodone immediate release (ROXICODONE) 10 MG immediate release tablet Take 10 mg by mouth every 3 hours as needed for Moderate Pain.     • tiotropium (SPIRIVA) 18 MCG Cap Inhale 1 Cap by mouth every day. 30 Cap 3   • duloxetine (CYMBALTA) 60 MG Cap DR Particles delayed-release capsule Take 1 Cap by mouth every day. 30 Cap 1   • insulin glargine (LANTUS) 100 UNIT/ML Solution Inject 22 Units as instructed every evening.     • levothyroxine (SYNTHROID) 75 MCG Tab TAKE 1 TABLET BY MOUTH DAILY 90 Tab 3   • montelukast (SINGULAIR) 10 MG Tab Take 1 Tab by mouth every day. 90 Tab 3   • trazodone (DESYREL) 150 MG Tab Take 2 Tabs by mouth every bedtime. 60 Tab 5   • predniSONE (DELTASONE) 20 MG Tab Take 2 Tabs by mouth every day. Take 40 mg for 5 days, then  Take 30 mg for 3 days then  Take 20 mg for 3 days then  Take 10 mg for 2 days then  Take 5 mg for 2 days and then stop. 15 Tab 0   • insulin lispro (HUMALOG) 100 UNIT/ML Solution Inject 3-12 Units as instructed 4 Times a Day,Before Meals and at Bedtime.     • Lactobacillus-Inulin (CULTURELLE DIGESTIVE HEALTH PO) Take 1 Tab by mouth every day.     • cyanocobalamin (VITAMIN B-12) 1000 MCG/ML Solution 1,000 mcg by Intramuscular route every Saturday.     • ipratropium-albuterol (COMBIVENT RESPIMAT)  MCG/ACT Aero Soln Inhale 1 Puff by mouth every 6 hours as needed. Uses prn only     • folic acid (FOLVITE) 800 MCG tablet Take 800 mcg by mouth every day.     • docusate sodium (COLACE) 100 MG Cap Take 100 mg by mouth 2 times a day.     • senna-docusate (PERICOLACE OR SENOKOT S) 8.6-50 MG Tab Take 1  "Tab by mouth every evening.       No current facility-administered medications for this visit.       Social History:     Social History     Social History   • Marital Status:      Spouse Name: N/A   • Number of Children: N/A   • Years of Education: N/A     Occupational History   • Not on file.     Social History Main Topics   • Smoking status: Former Smoker -- 1.00 packs/day for 50 years     Types: Cigarettes     Quit date: 02/01/2017   • Smokeless tobacco: Never Used   • Alcohol Use: No   • Drug Use: No   • Sexual Activity: Not on file     Other Topics Concern   • Not on file     Social History Narrative       Family History:     Family History   Problem Relation Age of Onset   • Lung Disease Mother    • Alcohol/Drug Mother    • Heart Disease Mother    • Cancer Father    • Cancer Brother    • Diabetes Maternal Grandmother    • Stroke Paternal Grandmother        Review of Systems:  All other review of systems are negative except what was mentioned above in the HPI.    Constitutional: No fever, chills, weight loss , significant, but improving malaise/fatigue.    HEENT: No new auditory or visual complaints. No sore throat and neck pain.     Respiratory: Positive for cough, sputum production, shortness of breath and wheezing.    Cardiovascular: No new chest pain, palpitations, orthopnea and leg swelling.    Gastrointestinal: No heartburn, nausea, vomiting ,abdominal pain, hematochezia or melena .   Genitourinary: Negative for dysuria, hematuria    Musculoskeletal: Positive for chronic arthralgias or myalgias   Skin: Negative for rash and itching.    Neurological: Negative for focal weakness or headaches.    Endo/Heme/Allergies: No abnormal bleed/bruise.    Psychiatric/Behavioral: No new depression/anxiety.    Physical Exam:  Vitals:   /52 mmHg  Pulse 71  Temp(Src) 36.7 °C (98.1 °F)  Resp 18  Ht 1.651 m (5' 5\")  Wt 98 kg (216 lb 0.8 oz)  BMI 35.95 kg/m2  SpO2 91%  Breastfeeding? No    General: Not " in acute distress, alert and oriented x 3.  HEENT: No pallor, icterus. Oropharynx clear.   Neck: Supple, no palpable masses.  Lymph nodes: No palpable cervical, supraclavicular, axillary or inguinal lymphadenopathy.    CVS: regular rate and rhythm, no rubs or gallops  RESP: Clear to auscultate bilaterally, no wheezing or crackles. Reduced air entry bibasilarly  ABD: Soft, non tender, non distended, positive bowel sounds, no palpable organomegaly  EXT: 1+ bipedal edema  CNS: Alert and oriented x3, No focal deficits.  Skin- No rash      Labs:   Recent Labs      08/15/17   1530   RBC  3.50*   HEMOGLOBIN  10.2*   HEMATOCRIT  32.5*   PLATELETCT  273   IRON  51   FERRITIN  359.8*   TOTIRONBC  245*     Lab Results   Component Value Date/Time    SODIUM 138 07/03/2017 03:26 AM    POTASSIUM 4.0 07/03/2017 03:26 AM    CHLORIDE 98 07/03/2017 03:26 AM    CO2 37* 07/03/2017 03:26 AM    GLUCOSE 195* 07/03/2017 03:26 AM    BUN 68* 07/03/2017 03:26 AM    CREATININE 1.34 07/03/2017 03:26 AM        Assessment and Plan:    1. Severe anemia in the setting of hospitalization with sepsis, possible CHF, pneumonia -she received multiple units of PRBC during the hospitalization.     Her labs from today later came back showing improved hemoglobin. Her iron panels are adequate. She does have elevated ferritin, some of which could be acute phase reactant. She does not need any active hematological intervention and hopefully her hemoglobin will continue to improve. LDH levels are within normal limits, ruling out any hemolysis. No significant reticulocytosis either.     She does have highly elevated ESR consistent with ongoing inflammation or PMR. I will also check serum protein electrophoresis to rule out any occult myeloma. If she continues to have elevated ESR, PMR is also a possibility, in which case she may benefit from steroids. I will see her back in 6 months with repeat CBC before.  She agreed and verbalized her agreement and  understanding with the current plan.  I answered all questions and concerns she has at this time.              Thank you for allowing me to participate in her care.    Please note that this dictation was created using voice recognition software. I have made every reasonable attempt to correct obvious errors, but I expect that there are errors of grammar and possibly content that I did not discover before finalizing the note.      SIGNATURES:  Brian Olea    CC:  Pcp Pt States None  Lashay Burrows*

## 2017-08-15 NOTE — MR AVS SNAPSHOT
"        Priya Clarkpson   8/15/2017 3:00 PM   Office Visit   MRN: [id number]    Department:  [facility]   Dept Phone:  [phone]    Description:  Female : 1950   Provider:  Brian Olea M.D.           Reason for Visit     New Patient Iron deficiency anemia unspecified. Ref: Lashay Flood      Allergies as of 8/15/2017     Allergen Noted Reactions    Vitamin C 10/29/2013   Diarrhea    \"violent diarrhea\"    Keflex 10/29/2013   Rash    Severe rash, but TOLERATES PENICILLINS, Rocephin     Codeine 10/29/2013   Nausea    Severe stomach pain    Lyrica 10/29/2013   Nausea    Nausea, dizzy    Sudafed 10/29/2013   Nausea, Unspecified    Chest pain, nausea      You were diagnosed with     Iron deficiency anemia, unspecified iron deficiency anemia type   [[id number]]         Vital Signs     Blood Pressure Pulse Temperature Respirations Height Weight    138/52 mmHg 71 36.7 °C (98.1 °F) 18 1.651 m (5' 5\") 98 kg (216 lb 0.8 oz)    Body Mass Index Oxygen Saturation Breastfeeding? Smoking Status          35.95 kg/m2 91% No Former Smoker        Basic Information     Date Of Birth Sex Race Ethnicity Preferred Language    1950 Female White Non- English      Your appointments     Aug 23, 2017  1:20 PM   [facility]    [address] [place] [id number] [phone]           You will be receiving a confirmation call a few days before your appointment from our automated call confirmation system.            Aug 29, 2017  1:20 PM   [facility]    [address] [place] [id number] [phone]            Sep 05, 2017  1:30 PM   BONE DENSITY (DEXASCAN) with [facility] 1   [facility])    [address]  [address]  [facility] [id number] [phone]           No calcium supplements 24 hours prior to exam, " including antacids or multivitamins w/calcium.  Pt may eat and drink normally.  No injection procedures prior to Dexa on the same day.  No barium contrast, no CTs with IV or oral contrast, no Pet/CTs and no Nuc Med injections for 7 days prior to a Dexa (including Barium Swallow, Upper GI, Small Bowel Series).  If scheduling a Dexa on the same day as a CT with IV or oral contrast, any test with barium, Pet/CT or a Nuc Med with injection, always schedule the Dexa before the other study.            Nov 02, 2017  2:00 PM   New Patient with Vito Burns M.D.   [facility] (--)    [address], [address]  [place] [address] [phone]           Please bring Photo ID, Insurance Cards, All Medication Bottles and copies of any legal documents (such as Living Will, Power of ) If speaking a language besides English please bring an adult . Please arrive 30 minutes prior for check in and registration. You will be receiving a confirmation call a few days before your appointment from our automated call confirmation system.            Dec 21, 2017  2:00 PM   Hematology Est Patient with Brian Olea M.D.   [facility] (--)    [address] [facility], [address]  [place] [address] [phone]              Problem List              ICD-10-CM Priority Class Noted - Resolved    Morbid obesity (CMS-HCC) (Chronic) E66.01 Low  10/30/2013 - Present    DM type 2, uncontrolled, with renal complications (CMS-[facility]) (Chronic) E11.29, E11.65 Medium  10/30/2013 - Present    Chronic pain G89.29 Low  10/30/2013 - Present    Iron deficiency anemia D50.9 Medium  10/30/2013 - Present    Acute on chronic diastolic CHF (congestive heart failure), NYHA class 1 (CMS-[facility]) I50.33   11/20/2013 - Present    Major depressive disorder (CMS-[facility]) (Chronic) F32.9 Low  11/20/2013 - Present    COPD (chronic obstructive pulmonary disease) (CMS-[facility]) (Chronic) J44.9 High  11/20/2013 - Present    HTN (hypertension)  I10 Low  11/20/2013 - Present    Chronic kidney disease, stage 3 N18.3 Low  1/6/2014 - Present    Chronic knee pain M25.569, G89.29   2/25/2014 - Present    Osteoarthritis M19.90 Low  2/25/2014 - Present    Insomnia G47.00 Low  2/25/2014 - Present    Hair loss L65.9   2/25/2014 - Present    Hypothyroidism (Chronic) E03.9 Low  4/1/2014 - Present    Vitamin D deficiency (Chronic) E55.9 Low  6/12/2014 - Present    Lumbar spondylosis with myelopathy M47.16   6/17/2014 - Present    Nocturnal hypoxia G47.34   7/21/2014 - Present    Pain of right heel M79.671   7/21/2014 - Present    Chronic pain syndrome (Chronic) G89.4 Low  8/28/2014 - Present    EDITH (obstructive sleep apnea) (Chronic) G47.33 Low  1/27/2015 - Present    Hypertensive heart disease with heart failure (CMS-HCC) (Chronic) I11.0 High  4/27/2015 - Present    Chronic respiratory failure (CMS-HCC) J96.10 Low  4/28/2015 - Present    Chronic constipation K59.09   7/7/2015 - Present    Pulmonary nodules R91.8   7/7/2015 - Present    Lumbosacral spondylosis without myelopathy M47.817   7/10/2015 - Present    Osteoarthritis, knee M17.10   9/2/2015 - Present    Osteoarthritis of spine with radiculopathy, lumbar region M47.26   10/2/2015 - Present    Bilateral hand pain M79.641, M79.642   12/21/2015 - Present    Primary osteoarthritis of both hands M19.041, M19.042   12/21/2015 - Present    Gastroesophageal reflux disease with esophagitis K21.0 Low  12/22/2015 - Present    Myoclonic jerking G25.3   4/12/2016 - Present    Cough R05   8/18/2016 - Present    Other spondylosis with radiculopathy, lumbar region M47.26   9/16/2016 - Present    Tobacco abuse disorder Z72.0   9/27/2016 - Present    Toenail fungus B35.1   11/21/2016 - Present    Septic shock (CMS-HCC) A41.9, R65.21 High  3/7/2017 - Present    Hyponatremia E87.1   3/8/2017 - Present    Hyperkalemia E87.5   3/8/2017 - Present    Acute on chronic renal failure (CMS-HCC) N17.9, N18.9   3/8/2017 - Present    Acute  on chronic respiratory failure (CMS-HCC) J96.20   3/8/2017 - Present    Severe sepsis with septic shock (CMS-HCC) A41.9, R65.21   3/8/2017 - Present    Diabetes mellitus, type II (CMS-HCC) (Chronic) E11.9 Low  4/4/2017 - Present    HAN (acute kidney injury) (CMS-HCC) N17.9 Medium  4/4/2017 - Present    CKD (chronic kidney disease), stage II (Chronic) N18.2 Medium  4/4/2017 - Present    HLD (hyperlipidemia) (Chronic) E78.5 Low  4/4/2017 - Present    Encephalopathy G93.40 High  4/4/2017 - Present    Neuropathy (CMS-HCC) G62.9 Low  4/4/2017 - Present    Leukocytosis D72.829 Low  4/4/2017 - Present    Hyperbilirubinemia E80.6 Medium  4/4/2017 - Present    Sepsis (CMS-HCC) A41.9   4/25/2017 - Present    Elevated troponin R74.8   4/25/2017 - Present    CAP (community acquired pneumonia) J18.9   4/26/2017 - Present    Tibia/fibula fracture (Chronic) S82.209A, S82.409A High  5/7/2017 - Present    Pneumonia J18.9 Medium  5/8/2017 - Present    Acute exacerbation of chronic obstructive pulmonary disease (COPD) (CMS-HCC) J44.1 Medium  5/8/2017 - Present    Anemia of chronic disease (Chronic) D63.8   5/27/2017 - Present    Anemia requiring transfusions D64.9   5/27/2017 - Present    Hypoxia R09.02   6/5/2017 - Present    Acute respiratory failure requiring reintubation (CMS-HCC)  (COPD exacerbation, infiltrates due to infectious vs inflammatory pneumonia?)  J96.00 High  6/22/2017 - Present    Chronic hypertension (Chronic) I10 Medium  6/30/2017 - Present    Atrial fibrillation (CMS-HCC) (Chronic) I48.91 High  6/30/2017 - Present    DM2 (diabetes mellitus, type 2) (CMS-HCC) (Chronic) E11.9 Low  6/30/2017 - Present    Thyroiditis E06.9 Low  6/30/2017 - Present    Severe sepsis (CMS-HCC) (Pneumonia, acute respiratory failure)  A41.9, R65.20 High  6/30/2017 - Present      Health Maintenance        Date Due Completion Dates    RETINAL SCREENING 9/26/1967 ---    IMM DTaP/Tdap/Td Vaccine (1 - Tdap) 9/26/1968 ---    IMM ZOSTER VACCINE  9/26/2009 ---    BONE DENSITY 9/26/2014 ---    MAMMOGRAM 2/17/2016 2/17/2015    DIABETES MONOFILAMENT / LE EXAM 6/7/2017 6/7/2016    IMM INFLUENZA (1) 9/1/2017 9/27/2016, 11/11/2013    A1C SCREENING 12/21/2017 6/21/2017, 3/7/2017, 1/26/2017, 5/27/2016, 12/17/2015, 8/11/2015, 2/24/2015, 6/2/2014, 8/31/2012    URINE ACR / MICROALBUMIN 1/26/2018 1/26/2017, 12/17/2015, 2/24/2015, 8/31/2012    COLON CANCER SCREENING ANNUAL FIT 4/3/2018 4/3/2017, 12/28/2015, 3/7/2015    FASTING LIPID PROFILE 5/15/2018 5/15/2017, 3/8/2017, 1/26/2017, 12/17/2015, 2/24/2015, 6/2/2014, 8/31/2012    SERUM CREATININE 7/3/2018 7/3/2017, 7/2/2017, 6/30/2017, 6/30/2017, 6/29/2017, 6/28/2017, 6/27/2017, 6/26/2017, 6/25/2017, 6/24/2017, 6/23/2017, 6/22/2017, 6/21/2017, 6/20/2017, 6/18/2017, 6/17/2017, 6/15/2017, 6/14/2017, 6/12/2017, 6/10/2017, 6/9/2017, 6/8/2017, 6/7/2017, 6/6/2017, 6/5/2017, 6/4/2017, 6/3/2017, 6/2/2017, 5/31/2017, 5/27/2017, 5/23/2017, 5/21/2017, 5/19/2017, 5/11/2017, 5/10/2017, 5/9/2017, 5/8/2017, 5/7/2017, 5/3/2017, 5/1/2017, 4/30/2017, 4/29/2017, 4/28/2017, 4/26/2017, 4/25/2017, 4/24/2017, 4/9/2017, 4/8/2017, 4/6/2017, 4/5/2017, 4/2/2017, 4/1/2017, 3/17/2017, 3/16/2017, 3/15/2017, 3/14/2017, 3/13/2017, 3/12/2017, 3/11/2017, 3/10/2017, 3/9/2017, 3/8/2017, 3/7/2017, 1/26/2017, 5/27/2016, 12/17/2015, 8/11/2015, 5/8/2015, 3/19/2015, 2/27/2015, 2/24/2015, 6/2/2014, 1/13/2014, 11/22/2013, 11/21/2013, 11/20/2013, 11/19/2013, 11/14/2013, 11/13/2013, 11/12/2013, 11/11/2013, 11/10/2013, 11/9/2013, 11/8/2013, 11/7/2013, 11/6/2013, 11/5/2013, 11/4/2013, 11/3/2013, 11/2/2013, 11/1/2013, 10/31/2013, 10/30/2013, 10/29/2013, 8/31/2012            Current Immunizations     13-VALENT PCV PREVNAR 9/28/2016    Influenza TIV (IM) 9/27/2016, 11/11/2013 11:45 AM    Pneumococcal polysaccharide vaccine (PPSV-23) 12/11/2014, 10/22/2013      Below and/or attached are the medications your provider expects you to take. Review all of your home medications  and newly ordered medications with your provider and/or pharmacist. Follow medication instructions as directed by your provider and/or pharmacist. Please keep your medication list with you and share with your provider. Update the information when medications are discontinued, doses are changed, or new medications (including over-the-counter products) are added; and carry medication information at all times in the event of emergency situations     Allergies:  VITAMIN C - Diarrhea     KEFLEX - Rash     CODEINE - Nausea     LYRICA - Nausea     SUDAFED - Nausea,Unspecified               Medications  Valid as of: August 15, 2017 -  3:54 PM    Generic Name Brand Name Tablet Size Instructions for use    AmLODIPine Besylate (Tab) NORVASC 10 MG Take 1 Tab by mouth every day.        Blood Glucose Monitoring Suppl (Device) FREESTYLE LITE  Check blood sugars three times daily.        Budesonide-Formoterol Fumarate (Aerosol) SYMBICORT 160-4.5 MCG/ACT Inhale 2 Puffs by mouth 2 Times a Day.        Carvedilol (Tab) COREG 3.125 MG Take 1 Tab by mouth 2 times a day, with meals.        Cholecalciferol (Tab) Vitamin D3 2000 UNITS Take 2,000 Units by mouth every day.        Cyanocobalamin (Tab) vitamin B-12 1000 MCG Take 1,000 mcg by mouth every evening.        Cyanocobalamin (Solution) VITAMIN B-12 1000 MCG/ML 1,000 mcg by Intramuscular route every Saturday.        Cyclobenzaprine HCl (Tab) FLEXERIL 10 MG         Docusate Sodium (Cap) COLACE 100 MG Take 100 mg by mouth 2 times a day.        DULoxetine HCl (Cap DR Particles) CYMBALTA 60 MG Take 1 Cap by mouth every day.        Escitalopram Oxalate (Tab) LEXAPRO 10 MG Take 1 Tab by mouth every day.        Fluticasone-Salmeterol (AEROSOL POWDER, BREATH ACTIVATED) ADVAIR DISKUS 500-50 MCG/DOSE         Folic Acid (Tab) FOLVITE 800 MCG Take 800 mcg by mouth every day.        Gabapentin (Tab) NEURONTIN 600 MG Take 1 Tab by mouth 3 times a day.        HydroCHLOROthiazide (Tab) HYDRODIURIL 25  MG Take 1 Tab by mouth every day.        Insulin Glargine (Solution) LANTUS 100 UNIT/ML Inject 22 Units as instructed every evening.        Insulin Lispro (Solution) HUMALOG 100 UNIT/ML Inject 3-12 Units as instructed 4 Times a Day,Before Meals and at Bedtime.        Ipratropium-Albuterol (Aero Soln) COMBIVENT RESPIMAT  MCG/ACT Inhale 1 Puff by mouth every 6 hours as needed. Uses prn only        Lactobacillus-Inulin   Take 1 Tab by mouth every day.        Levothyroxine Sodium (Tab) SYNTHROID 75 MCG TAKE 1 TABLET BY MOUTH DAILY        LORazepam (Tab) ATIVAN 0.5 MG Take 0.5 mg by mouth every evening.        Montelukast Sodium (Tab) SINGULAIR 10 MG Take 1 Tab by mouth every day.        Multiple Vitamin (Tab) THERAGRAN  Take 1 Tab by mouth every day.        Nystatin-Triamcinolone (Cream) MYCOLOG 498119-6.1 UNIT/GM-% Apply in a thin layer to fungal rash once per day prn        Omeprazole (CAPSULE DELAYED RELEASE) PRILOSEC 40 MG Take 1 Cap by mouth every day.        OxyCODONE HCl (Tab) ROXICODONE 10 MG Take 10 mg by mouth every 3 hours as needed for Moderate Pain.        PredniSONE (Tab) DELTASONE 20 MG Take 2 Tabs by mouth every day. Take 40 mg for 5 days, then  Take 30 mg for 3 days then  Take 20 mg for 3 days then  Take 10 mg for 2 days then  Take 5 mg for 2 days and then stop.        Sennosides-Docusate Sodium (Tab) PERICOLACE or SENOKOT S 8.6-50 MG Take 1 Tab by mouth every evening.        Tiotropium Bromide Monohydrate (Cap) SPIRIVA 18 MCG Inhale 1 Cap by mouth every day.        TraZODone HCl (Tab) DESYREL 150 MG Take 2 Tabs by mouth every bedtime.        TraZODone HCl (Tab) DESYREL 100 MG TAKE 3 TABLETS BY MOUTH NIGHTLY AT BEDTIME AS NEEDED FOR SLEEP        .                 Medicines prescribed today were sent to:     [facility] - [place] - [address] [place], [place] [address] [address]    Phone: [phone] Fax: [phone]    Open 24 Hours?: No      Medication refill instructions:       If your  prescription bottle indicates you have medication refills left, it is not necessary to call your provider’s office. Please contact your pharmacy and they will refill your medication.    If your prescription bottle indicates you do not have any refills left, you may request refills at any time through one of the following ways: The online [employer] system (except Urgent Care), by calling your provider’s office, or by asking your pharmacy to contact your provider’s office with a refill request. Medication refills are processed only during regular business hours and may not be available until the next business day. Your provider may request additional information or to have a follow-up visit with you prior to refilling your medication.   *Please Note: Medication refills are assigned a new Rx number when refilled electronically. Your pharmacy may indicate that no refills were authorized even though a new prescription for the same medication is available at the pharmacy. Please request the medicine by name with the pharmacy before contacting your provider for a refill.        Your To Do List     Future Labs/Procedures Complete By Expires    CBC WITH DIFFERENTIAL  As directed 8/15/2018    FERRITIN  As directed 8/15/2018    IRON/TOTAL IRON BIND  As directed 8/15/2018    LDH  As directed 8/15/2018    RETICULOCYTES COUNT  As directed 8/15/2018    SPEP W/REFLEX TO MICAH, A, G, M  As directed 8/15/2018    WESTERGREN SED RATE  As directed 8/15/2018      Other Notes About Your Plan     On pain contract with Dr. Lawson  Last UDS: 3/26/2015 Dr Lawson  Contolled Substance agreement signed: 8/28/2014    Comprehensive Medication Review: appt 11.18.2014 in [facility] Status: Patient Declined

## 2017-08-17 LAB
ALBUMIN SERPL-MCNC: 3.56 G/DL (ref 3.75–5.01)
ALPHA1 GLOB SERPL ELPH-MCNC: 0.48 G/DL (ref 0.19–0.46)
ALPHA2 GLOB SERPL ELPH-MCNC: 1.06 G/DL (ref 0.48–1.05)
B-GLOBULIN SERPL ELPH-MCNC: 0.85 G/DL (ref 0.48–1.1)
GAMMA GLOB SERPL ELPH-MCNC: 0.95 G/DL (ref 0.62–1.51)
INTERPRETATION SERPL IFE-IMP: ABNORMAL
MONOCLON BAND OBS SERPL: ABNORMAL
MYCOBACTERIUM SPEC CULT: NORMAL
PATHOLOGY STUDY: ABNORMAL
PROT SERPL-MCNC: 6.9 G/DL (ref 6–8.3)
RHODAMINE-AURAMINE STN SPEC: NORMAL
SIGNIFICANT IND 70042: NORMAL
SITE SITE: NORMAL
SOURCE SOURCE: NORMAL

## 2017-08-18 NOTE — TELEPHONE ENCOUNTER
Dr. Mccall- Pt was originally initiated on this inpatient. You have upcoming appt to establish with pt next week. Please advise.

## 2017-08-20 ENCOUNTER — OFFICE VISIT (OUTPATIENT)
Dept: URGENT CARE | Facility: PHYSICIAN GROUP | Age: 67
End: 2017-08-20
Payer: MEDICARE

## 2017-08-20 ENCOUNTER — APPOINTMENT (OUTPATIENT)
Dept: RADIOLOGY | Facility: IMAGING CENTER | Age: 67
End: 2017-08-20
Attending: NURSE PRACTITIONER
Payer: MEDICARE

## 2017-08-20 VITALS
HEART RATE: 60 BPM | TEMPERATURE: 98.7 F | HEIGHT: 65 IN | WEIGHT: 220.6 LBS | DIASTOLIC BLOOD PRESSURE: 78 MMHG | RESPIRATION RATE: 20 BRPM | SYSTOLIC BLOOD PRESSURE: 128 MMHG | BODY MASS INDEX: 36.75 KG/M2 | OXYGEN SATURATION: 92 %

## 2017-08-20 DIAGNOSIS — M25.511 ACUTE PAIN OF RIGHT SHOULDER: ICD-10-CM

## 2017-08-20 DIAGNOSIS — S80.01XA CONTUSION OF KNEE, RIGHT: ICD-10-CM
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
## 2017-08-20 DIAGNOSIS — M54.50 CHRONIC MIDLINE LOW BACK PAIN WITHOUT SCIATICA: ICD-10-CM

## 2017-08-20 DIAGNOSIS — S51.012A SKIN TEAR OF LEFT ELBOW WITHOUT COMPLICATION, INITIAL ENCOUNTER: ICD-10-CM

## 2017-08-20 DIAGNOSIS — G89.29 CHRONIC MIDLINE LOW BACK PAIN WITHOUT SCIATICA: ICD-10-CM

## 2017-08-20 DIAGNOSIS — S16.1XXA NECK STRAIN, INITIAL ENCOUNTER: ICD-10-CM

## 2017-08-20 DIAGNOSIS — S51.011A SKIN TEAR OF ELBOW WITHOUT COMPLICATION, RIGHT, INITIAL ENCOUNTER: ICD-10-CM

## 2017-08-20 PROCEDURE — 99214 OFFICE O/P EST MOD 30 MIN: CPT | Performed by: NURSE PRACTITIONER

## 2017-08-20 PROCEDURE — 73030 X-RAY EXAM OF SHOULDER: CPT | Mod: TC,RT | Performed by: NURSE PRACTITIONER

## 2017-08-20 ASSESSMENT — PAIN SCALES - GENERAL: PAINLEVEL: 7=MODERATE-SEVERE PAIN

## 2017-08-20 ASSESSMENT — ENCOUNTER SYMPTOMS
NECK PAIN: 1
BACK PAIN: 1

## 2017-08-20 NOTE — MR AVS SNAPSHOT
"        Priya Burt London   2017 2:20 PM   Office Visit   MRN: [id number]    Department:  [facility]   Dept Phone:  [phone]    Description:  Female : 1950   Provider:  MRALO Pittman           Reason for Visit     Fall was on the ground 3 hours before help came    Neck Pain     Shoulder Pain     Back Pain           Allergies as of 2017     Allergen Noted Reactions    Vitamin C 10/29/2013   Diarrhea    \"violent diarrhea\"    Keflex 10/29/2013   Rash    Severe rash, but TOLERATES PENICILLINS, Rocephin     Codeine 10/29/2013   Nausea    Severe stomach pain    Lyrica 10/29/2013   Nausea    Nausea, dizzy    Sudafed 10/29/2013   Nausea, Unspecified    Chest pain, nausea      You were diagnosed with     Fall, initial encounter   [859806]       Acute pain of right shoulder   [7720729]       Contusion of knee, right   [042608]       Skin tear of elbow without complication, right, initial encounter   [698855]       Skin tear of left elbow without complication, initial encounter   [2543117]       Neck strain, initial encounter   [640824]       Chronic midline low back pain without sciatica   [7035103]         Vital Signs     Blood Pressure Pulse Temperature Respirations Height Weight    128/78 mmHg 60 37.1 °C (98.7 °F) 20 1.651 m (5' 5\") 100.064 kg (220 lb 9.6 oz)    Body Mass Index Oxygen Saturation Smoking Status             36.71 kg/m2 92% Former Smoker         Basic Information     Date Of Birth Sex Race Ethnicity Preferred Language    1950 Female White Non- English      Your appointments     Aug 23, 2017  1:20 PM   Established Patient with Kavitha Mccall M.D.   [facility]    [address] [place] [id number] [phone]           You will be receiving a confirmation call a few days before your appointment from our automated call confirmation system.            Aug 29, 2017  1:20 PM   NEW TO YOU with Kavitha Mccall, " M.D.   [facility]    [address] [facility] [place] [id number] [phone]            Sep 05, 2017  1:30 PM   BONE DENSITY (DEXASCAN) with [facility] BD 1   [facility]    [address]  [address]  [place] [address] [phone]           No calcium supplements 24 hours prior to exam, including antacids or multivitamins w/calcium.  Pt may eat and drink normally.  No injection procedures prior to Dexa on the same day.  No barium contrast, no CTs with IV or oral contrast, no Pet/CTs and no Nuc Med injections for 7 days prior to a Dexa (including Barium Swallow, Upper GI, Small Bowel Series).  If scheduling a Dexa on the same day as a CT with IV or oral contrast, any test with barium, Pet/CT or a Nuc Med with injection, always schedule the Dexa before the other study.            Nov 02, 2017  2:00 PM   New Patient with Vito Burns M.D.   [facility] (--)    [address], [address]  [place] [id number] [phone]           Please bring Photo ID, Insurance Cards, All Medication Bottles and copies of any legal documents (such as Living Will, Power of ) If speaking a language besides English please bring an adult . Please arrive 30 minutes prior for check in and registration. You will be receiving a confirmation call a few days before your appointment from our automated call confirmation system.            Dec 21, 2017  2:00 PM   Hematology Est Patient with Brian Olea M.D.   [facility] (--)    [address], [address]  [place] [id number] [phone]              Problem List              ICD-10-CM Priority Class Noted - Resolved    Morbid obesity (CMS-HCC) (Chronic) E66.01 Low  10/30/2013 - Present    DM type 2, uncontrolled, with renal complications (CMS-HCC) (Chronic) E11.29, E11.65 Medium  10/30/2013 - Present    Chronic pain G89.29 Low  10/30/2013 - Present    Iron deficiency  anemia D50.9 Medium  10/30/2013 - Present    Acute on chronic diastolic CHF (congestive heart failure), NYHA class 1 (CMS-HCC) I50.33   11/20/2013 - Present    Major depressive disorder (CMS-HCC) (Chronic) F32.9 Low  11/20/2013 - Present    COPD (chronic obstructive pulmonary disease) (CMS-HCC) (Chronic) J44.9 High  11/20/2013 - Present    HTN (hypertension) I10 Low  11/20/2013 - Present    Chronic kidney disease, stage 3 N18.3 Low  1/6/2014 - Present    Chronic knee pain M25.569, G89.29   2/25/2014 - Present    Osteoarthritis M19.90 Low  2/25/2014 - Present    Insomnia G47.00 Low  2/25/2014 - Present    Hair loss L65.9   2/25/2014 - Present    Hypothyroidism (Chronic) E03.9 Low  4/1/2014 - Present    Vitamin D deficiency (Chronic) E55.9 Low  6/12/2014 - Present    Lumbar spondylosis with myelopathy M47.16   6/17/2014 - Present    Nocturnal hypoxia G47.34   7/21/2014 - Present    Pain of right heel M79.671   7/21/2014 - Present    Chronic pain syndrome (Chronic) G89.4 Low  8/28/2014 - Present    EDITH (obstructive sleep apnea) (Chronic) G47.33 Low  1/27/2015 - Present    Hypertensive heart disease with heart failure (CMS-HCC) (Chronic) I11.0 High  4/27/2015 - Present    Chronic respiratory failure (CMS-HCC) J96.10 Low  4/28/2015 - Present    Chronic constipation K59.09   7/7/2015 - Present    Pulmonary nodules R91.8   7/7/2015 - Present    Lumbosacral spondylosis without myelopathy M47.817   7/10/2015 - Present    Osteoarthritis, knee M17.10   9/2/2015 - Present    Osteoarthritis of spine with radiculopathy, lumbar region M47.26   10/2/2015 - Present    Bilateral hand pain M79.641, M79.642   12/21/2015 - Present    Primary osteoarthritis of both hands M19.041, M19.042   12/21/2015 - Present    Gastroesophageal reflux disease with esophagitis K21.0 Low  12/22/2015 - Present    Myoclonic jerking G25.3   4/12/2016 - Present    Cough R05   8/18/2016 - Present    Other spondylosis with radiculopathy, lumbar region M47.26    9/16/2016 - Present    Tobacco abuse disorder Z72.0   9/27/2016 - Present    Toenail fungus B35.1   11/21/2016 - Present    Septic shock (CMS-HCC) A41.9, R65.21 High  3/7/2017 - Present    Hyponatremia E87.1   3/8/2017 - Present    Hyperkalemia E87.5   3/8/2017 - Present    Acute on chronic renal failure (CMS-HCC) N17.9, N18.9   3/8/2017 - Present    Acute on chronic respiratory failure (CMS-HCC) J96.20   3/8/2017 - Present    Severe sepsis with septic shock (CMS-HCC) A41.9, R65.21   3/8/2017 - Present    Diabetes mellitus, type II (CMS-HCC) (Chronic) E11.9 Low  4/4/2017 - Present    HAN (acute kidney injury) (CMS-HCC) N17.9 Medium  4/4/2017 - Present    CKD (chronic kidney disease), stage II (Chronic) N18.2 Medium  4/4/2017 - Present    HLD (hyperlipidemia) (Chronic) E78.5 Low  4/4/2017 - Present    Encephalopathy G93.40 High  4/4/2017 - Present    Neuropathy (CMS-HCC) G62.9 Low  4/4/2017 - Present    Leukocytosis D72.829 Low  4/4/2017 - Present    Hyperbilirubinemia E80.6 Medium  4/4/2017 - Present    Sepsis (CMS-HCC) A41.9   4/25/2017 - Present    Elevated troponin R74.8   4/25/2017 - Present    CAP (community acquired pneumonia) J18.9   4/26/2017 - Present    Tibia/fibula fracture (Chronic) S82.209A, S82.409A High  5/7/2017 - Present    Pneumonia J18.9 Medium  5/8/2017 - Present    Acute exacerbation of chronic obstructive pulmonary disease (COPD) (CMS-HCC) J44.1 Medium  5/8/2017 - Present    Anemia of chronic disease (Chronic) D63.8   5/27/2017 - Present    Anemia requiring transfusions D64.9   5/27/2017 - Present    Hypoxia R09.02   6/5/2017 - Present    Acute respiratory failure requiring reintubation (CMS-HCC)  (COPD exacerbation, infiltrates due to infectious vs inflammatory pneumonia?)  J96.00 High  6/22/2017 - Present    Chronic hypertension (Chronic) I10 Medium  6/30/2017 - Present    Atrial fibrillation (CMS-HCC) (Chronic) I48.91 High  6/30/2017 - Present    DM2 (diabetes mellitus, type 2) (CMS-HCC)  (Chronic) E11.9 Low  6/30/2017 - Present    Thyroiditis E06.9 Low  6/30/2017 - Present    Severe sepsis (CMS-HCC) (Pneumonia, acute respiratory failure)  A41.9, R65.20 High  6/30/2017 - Present      Health Maintenance        Date Due Completion Dates    RETINAL SCREENING 9/26/1967 ---    IMM DTaP/Tdap/Td Vaccine (1 - Tdap) 9/26/1968 ---    IMM ZOSTER VACCINE 9/26/2009 ---    BONE DENSITY 9/26/2014 ---    MAMMOGRAM 2/17/2016 2/17/2015    DIABETES MONOFILAMENT / LE EXAM 6/7/2017 6/7/2016    IMM INFLUENZA (1) 9/1/2017 9/27/2016, 11/11/2013    A1C SCREENING 12/21/2017 6/21/2017, 3/7/2017, 1/26/2017, 5/27/2016, 12/17/2015, 8/11/2015, 2/24/2015, 6/2/2014, 8/31/2012    URINE ACR / MICROALBUMIN 1/26/2018 1/26/2017, 12/17/2015, 2/24/2015, 8/31/2012    COLON CANCER SCREENING ANNUAL FIT 4/3/2018 4/3/2017, 12/28/2015, 3/7/2015    FASTING LIPID PROFILE 5/15/2018 5/15/2017, 3/8/2017, 1/26/2017, 12/17/2015, 2/24/2015, 6/2/2014, 8/31/2012    SERUM CREATININE 7/3/2018 7/3/2017, 7/2/2017, 6/30/2017, 6/30/2017, 6/29/2017, 6/28/2017, 6/27/2017, 6/26/2017, 6/25/2017, 6/24/2017, 6/23/2017, 6/22/2017, 6/21/2017, 6/20/2017, 6/18/2017, 6/17/2017, 6/15/2017, 6/14/2017, 6/12/2017, 6/10/2017, 6/9/2017, 6/8/2017, 6/7/2017, 6/6/2017, 6/5/2017, 6/4/2017, 6/3/2017, 6/2/2017, 5/31/2017, 5/27/2017, 5/23/2017, 5/21/2017, 5/19/2017, 5/11/2017, 5/10/2017, 5/9/2017, 5/8/2017, 5/7/2017, 5/3/2017, 5/1/2017, 4/30/2017, 4/29/2017, 4/28/2017, 4/26/2017, 4/25/2017, 4/24/2017, 4/9/2017, 4/8/2017, 4/6/2017, 4/5/2017, 4/2/2017, 4/1/2017, 3/17/2017, 3/16/2017, 3/15/2017, 3/14/2017, 3/13/2017, 3/12/2017, 3/11/2017, 3/10/2017, 3/9/2017, 3/8/2017, 3/7/2017, 1/26/2017, 5/27/2016, 12/17/2015, 8/11/2015, 5/8/2015, 3/19/2015, 2/27/2015, 2/24/2015, 6/2/2014, 1/13/2014, 11/22/2013, 11/21/2013, 11/20/2013, 11/19/2013, 11/14/2013, 11/13/2013, 11/12/2013, 11/11/2013, 11/10/2013, 11/9/2013, 11/8/2013, 11/7/2013, 11/6/2013, 11/5/2013, 11/4/2013, 11/3/2013, 11/2/2013,  11/1/2013, 10/31/2013, 10/30/2013, 10/29/2013, 8/31/2012            Current Immunizations     13-VALENT PCV PREVNAR 9/28/2016    Influenza TIV (IM) 9/27/2016, 11/11/2013 11:45 AM    Pneumococcal polysaccharide vaccine (PPSV-23) 12/11/2014, 10/22/2013      Below and/or attached are the medications your provider expects you to take. Review all of your home medications and newly ordered medications with your provider and/or pharmacist. Follow medication instructions as directed by your provider and/or pharmacist. Please keep your medication list with you and share with your provider. Update the information when medications are discontinued, doses are changed, or new medications (including over-the-counter products) are added; and carry medication information at all times in the event of emergency situations     Allergies:  VITAMIN C - Diarrhea     KEFLEX - Rash     CODEINE - Nausea     LYRICA - Nausea     SUDAFED - Nausea,Unspecified               Medications  Valid as of: August 20, 2017 -  3:36 PM    Generic Name Brand Name Tablet Size Instructions for use    AmLODIPine Besylate (Tab) NORVASC 10 MG Take 1 Tab by mouth every day.        Blood Glucose Monitoring Suppl (Device) FREESTYLE LITE  Check blood sugars three times daily.        Budesonide-Formoterol Fumarate (Aerosol) SYMBICORT 160-4.5 MCG/ACT Inhale 2 Puffs by mouth 2 Times a Day.        Carvedilol (Tab) COREG 3.125 MG Take 1 Tab by mouth 2 times a day, with meals.        Cholecalciferol (Tab) Vitamin D3 2000 units Take 2,000 Units by mouth every day.        Cyanocobalamin (Tab) vitamin B-12 1000 MCG Take 1,000 mcg by mouth every evening.        Cyanocobalamin (Solution) VITAMIN B-12 1000 MCG/ML 1,000 mcg by Intramuscular route every Saturday.        Cyclobenzaprine HCl (Tab) FLEXERIL 10 MG         Docusate Sodium (Cap) COLACE 100 MG Take 100 mg by mouth 2 times a day.        DULoxetine HCl (Cap DR Particles) CYMBALTA 60 MG Take 1 Cap by mouth every day.          Escitalopram Oxalate (Tab) LEXAPRO 10 MG Take 1 Tab by mouth every day.        Fluticasone-Salmeterol (AEROSOL POWDER, BREATH ACTIVATED) ADVAIR DISKUS 500-50 MCG/DOSE         Folic Acid (Tab) FOLVITE 800 MCG Take 800 mcg by mouth every day.        Gabapentin (Tab) NEURONTIN 600 MG Take 1 Tab by mouth 3 times a day.        HydroCHLOROthiazide (Tab) HYDRODIURIL 25 MG Take 1 Tab by mouth every day.        Insulin Glargine (Solution) LANTUS 100 UNIT/ML Inject 22 Units as instructed every evening.        Insulin Lispro (Solution) HUMALOG 100 UNIT/ML Inject 3-12 Units as instructed 4 Times a Day,Before Meals and at Bedtime.        Ipratropium-Albuterol (Aero Soln) COMBIVENT RESPIMAT  MCG/ACT Inhale 1 Puff by mouth every 6 hours as needed. Uses prn only        Lactobacillus-Inulin   Take 1 Tab by mouth every day.        Levothyroxine Sodium (Tab) SYNTHROID 75 MCG TAKE 1 TABLET BY MOUTH DAILY        LORazepam (Tab) ATIVAN 0.5 MG Take 0.5 mg by mouth every evening.        Montelukast Sodium (Tab) SINGULAIR 10 MG Take 1 Tab by mouth every day.        Multiple Vitamin (Tab) THERAGRAN  Take 1 Tab by mouth every day.        Nystatin-Triamcinolone (Cream) MYCOLOG 268637-9.1 UNIT/GM-% Apply in a thin layer to fungal rash once per day prn        Omeprazole (CAPSULE DELAYED RELEASE) PRILOSEC 40 MG Take 1 Cap by mouth every day.        OxyCODONE HCl (Tab) ROXICODONE 10 MG Take 10 mg by mouth every 3 hours as needed for Moderate Pain.        PredniSONE (Tab) DELTASONE 20 MG Take 2 Tabs by mouth every day. Take 40 mg for 5 days, then  Take 30 mg for 3 days then  Take 20 mg for 3 days then  Take 10 mg for 2 days then  Take 5 mg for 2 days and then stop.        Sennosides-Docusate Sodium (Tab) PERICOLACE or SENOKOT S 8.6-50 MG Take 1 Tab by mouth every evening.        Tiotropium Bromide Monohydrate (Cap) SPIRIVA 18 MCG Inhale 1 Cap by mouth every day.        TraZODone HCl (Tab) DESYREL 150 MG Take 2 Tabs by mouth every bedtime.         TraZODone HCl (Tab) DESYREL 100 MG TAKE 3 TABLETS BY MOUTH NIGHTLY AT BEDTIME AS NEEDED FOR SLEEP        .                 Medicines prescribed today were sent to:     [facility] - [place], [place] - [address] [facility]    [address] [place] [address]    Phone: [phone] Fax: [phone]    Open 24 Hours?: No      Medication refill instructions:       If your prescription bottle indicates you have medication refills left, it is not necessary to call your provider’s office. Please contact your pharmacy and they will refill your medication.    If your prescription bottle indicates you do not have any refills left, you may request refills at any time through one of the following ways: The online [facility] system (except Urgent Care), by calling your provider’s office, or by asking your pharmacy to contact your provider’s office with a refill request. Medication refills are processed only during regular business hours and may not be available until the next business day. Your provider may request additional information or to have a follow-up visit with you prior to refilling your medication.   *Please Note: Medication refills are assigned a new Rx number when refilled electronically. Your pharmacy may indicate that no refills were authorized even though a new prescription for the same medication is available at the pharmacy. Please request the medicine by name with the pharmacy before contacting your provider for a refill.        Your To Do List     Future Labs/Procedures Complete By Expires    DX-SHOULDER 2+ RIGHT  As directed 8/20/2018      Other Notes About Your Plan     On pain contract with Dr. Lawson  Last UDS: 3/26/2015 Dr Lawson  Contolled Substance agreement signed: 8/28/2014    Comprehensive Medication Review: appt 11.18.2014 in [place].                 [facility] Status: Patient Declined

## 2017-08-20 NOTE — PROGRESS NOTES
Subjective:      Priya Callahan is a 66 y.o. female who presents with Fall; Neck Pain; Shoulder Pain; and Back Pain            Fall  Pertinent negatives include no fever, headaches, loss of consciousness or tingling.   Neck Pain   Pertinent negatives include no fever, headaches, tingling or weakness.   Shoulder Pain  Associated symptoms include neck pain. Pertinent negatives include no chills, diaphoresis, fever, headaches or weakness.   Back Pain  Pertinent negatives include no fever, headaches, tingling or weakness.   Patient reports that she fell yesterday.  She was rising from her bed to move to her wheelchair.  She felt unstable and her legs gave out.  She caught herself as she fell and went down slowly to the floor, partially pinning herself between the bed and wheelchair.  She did not hit her head or experience loss of consciousness.  She was on the floor for approximately 3 hours and sustained abrasions to her elbows and knees as she tried to rise from the floor unassisted.  She has a complex medical history significant for multiple chronic disease.  She is oxygen dependent.  Denies any shortness of breath, chest pain, or altered mentation.  Her primary concern is right shoulder discomfort, right knee pain, and abrasions on both elbows.  She has chronic back pain with no change from baseline and takes oxycodone for pain management.  No saddle anesthesia or loss of control of bowel or bladder.  No abdominal pain.  Patient is schedule to establish care with Dr. Mccall on 8/23/17.    Review of Systems   Constitutional: Negative for fever, chills, malaise/fatigue and diaphoresis.   Musculoskeletal: Positive for back pain, joint pain, falls and neck pain.   Neurological: Negative for dizziness, tingling, sensory change, speech change, focal weakness, loss of consciousness, weakness and headaches.     Medications, Allergies, and current problem list reviewed today in Epic     Objective:     /78  "mmHg  Pulse 60  Temp(Src) 37.1 °C (98.7 °F)  Resp 20  Ht 1.651 m (5' 5\")  Wt 100.064 kg (220 lb 9.6 oz)  BMI 36.71 kg/m2  SpO2 92%     Physical Exam   Constitutional: She is oriented to person, place, and time. She appears well-developed and well-nourished. No distress.   HENT:   Head: Normocephalic and atraumatic.   Eyes: Pupils are equal, round, and reactive to light.   Neck: Normal range of motion. Neck supple. No JVD present. No tracheal deviation present. No thyromegaly present.   Patient moves neck spontaneously during exam with no wincing or difficulty.  No bony tenderness.     Cardiovascular: Normal rate, regular rhythm and normal heart sounds.  Exam reveals no gallop and no friction rub.    No murmur heard.  Pulmonary/Chest: Effort normal and breath sounds normal. No stridor. No respiratory distress. She has no wheezes. She has no rales. She exhibits no tenderness.   Patient on supplemental portable O2.     Abdominal: Soft. She exhibits no distension. There is no tenderness.   Musculoskeletal: She exhibits no edema.        Right shoulder: She exhibits decreased range of motion, tenderness, bony tenderness and pain. She exhibits no swelling, no effusion, no crepitus, no deformity, no laceration, no spasm and normal pulse.        Arms:  Right shoulder is warm, dry, and intact with no bruising, swelling, erythema, rash or lesion.  No palpable muscle spasm.  Generalized anterior and posterior joint tenderness.  ROM limited with pain on abduction beyond 30 degrees.  Patient unable to perform flexion beyond 50 degrees.  Internal and external rotation provoke pain.  NV intact.   strength intact.    Right knee has mild TTP anteriorly with no joint instability.  ROM intact.  Light bruising and superficial abrasion noted.  No effusion.  Patella tracks midline.  Negative anterior and posterior drawer.  No pain with varus/valgus stress.     Lymphadenopathy:     She has no cervical adenopathy.   Neurological: " She is alert and oriented to person, place, and time. No cranial nerve deficit.   Skin: Skin is warm and dry. No rash noted. She is not diaphoretic. No erythema.        Skin tears on both elbows.  No active bleeding.  No evidence of secondary skin infection or poor healing.  No flap present for repositioning.  Area cleansed with normal saline.  No evidence of retained foreign debris.  Xeroform dressing and gauze bandage applied.  Patient tolerated well.     Vitals reviewed.          [facility] Info      DX-SHOULDER 2+ (Order #[id number]) on 8/20/17       Narrative        8/20/2017 3:04 PM    HISTORY/REASON FOR EXAM:  Recent fall with right shoulder pain.      TECHNIQUE/EXAM DESCRIPTION AND NUMBER OF VIEWS:  3 views of the RIGHT shoulder.    COMPARISON: None    FINDINGS:  There is no evidence of displaced  fracture or dislocation.    There is moderate degenerative spurring in the AC joint. There is mild spurring in the inferior glenohumeral joint.    The visualized lung parenchyma is clear.    There are no displaced rib fractures in this field-of-view.         Impression        1.  There is no acute fracture or dislocation of the right shoulder.  2.  There is degenerative change in the AC joint and inferior glenohumeral joint.         Reading Provider Reading Date     Lucinda Morrissey M.D. Aug 20, 2017         Signing Provider Signing Date Signing Time     Lucinda Morrissey M.D. Aug 20, 2017  3:24 PM                Assessment/Plan:     1. Fall, initial encounter  DX-SHOULDER 2+ RIGHT   2. Acute pain of right shoulder  DX-SHOULDER 2+ RIGHT   3. Contusion of knee, right     4. Skin tear of elbow without complication, right, initial encounter     5. Skin tear of left elbow without complication, initial encounter     6. Neck strain, initial encounter     7. Chronic midline low back pain without sciatica       Discussed exam findings and imaging results with patient.  As she already takes oxycodone and flexeril, no  additional pain control is indicated.  Rest, ice, and gentle massage to right shoulder and knee.    Keep skin tears clean.  Change dressings daily; supplies provided.  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    Follow up with PCP as scheduled in 3 days.  ED precaution for sooner care if worse.  Patient verbalized understanding of and agreed with plan of care.

## 2017-08-21 RX ORDER — HYDROCORTISONE 20 MG/1
20 TABLET ORAL DAILY
Qty: 30 TAB | Refills: 0 | OUTPATIENT
Start: 2017-08-21

## 2017-08-21 ASSESSMENT — ENCOUNTER SYMPTOMS
HEADACHES: 0
FOCAL WEAKNESS: 0
DIAPHORESIS: 0
LOSS OF CONSCIOUSNESS: 0
CHILLS: 0
SPEECH CHANGE: 0
DIZZINESS: 0
FALLS: 1
WEAKNESS: 0
TINGLING: 0
FEVER: 0
SENSORY CHANGE: 0

## 2017-08-22 ENCOUNTER — TELEPHONE (OUTPATIENT)
Dept: MEDICAL GROUP | Facility: PHYSICIAN GROUP | Age: 67
End: 2017-08-22

## 2017-08-22 NOTE — TELEPHONE ENCOUNTER
Steve with [facility] requesting order for Front Wheel Walker for more stability. Priya has appt tomorrow. Steve informing Priya unstable with her 4 wheel walker.

## 2017-08-23 ENCOUNTER — OFFICE VISIT (OUTPATIENT)
Dept: MEDICAL GROUP | Facility: PHYSICIAN GROUP | Age: 67
End: 2017-08-23
Payer: MEDICARE

## 2017-08-23 VITALS
HEART RATE: 62 BPM | BODY MASS INDEX: 36.65 KG/M2 | SYSTOLIC BLOOD PRESSURE: 118 MMHG | TEMPERATURE: 96.4 F | WEIGHT: 220 LBS | RESPIRATION RATE: 16 BRPM | HEIGHT: 65 IN | OXYGEN SATURATION: 92 % | DIASTOLIC BLOOD PRESSURE: 76 MMHG

## 2017-08-23 DIAGNOSIS — J43.9 PULMONARY EMPHYSEMA, UNSPECIFIED EMPHYSEMA TYPE (HCC): Chronic | ICD-10-CM

## 2017-08-23 DIAGNOSIS — R29.6 FALLS FREQUENTLY: ICD-10-CM

## 2017-08-23 DIAGNOSIS — M47.26 OSTEOARTHRITIS OF SPINE WITH RADICULOPATHY, LUMBAR REGION: ICD-10-CM

## 2017-08-23 DIAGNOSIS — G89.4 CHRONIC PAIN SYNDROME: Chronic | ICD-10-CM

## 2017-08-23 PROCEDURE — 99214 OFFICE O/P EST MOD 30 MIN: CPT | Performed by: FAMILY MEDICINE

## 2017-08-23 RX ORDER — OXYCODONE HYDROCHLORIDE 10 MG/1
10 TABLET ORAL EVERY 6 HOURS PRN
Qty: 120 TAB | Refills: 0 | Status: ON HOLD | OUTPATIENT
Start: 2017-10-23 | End: 2017-11-12

## 2017-08-23 RX ORDER — OXYCODONE HYDROCHLORIDE 10 MG/1
10 TABLET ORAL EVERY 6 HOURS PRN
Qty: 120 TAB | Refills: 0 | Status: SHIPPED | OUTPATIENT
Start: 2017-08-23 | End: 2017-08-23 | Stop reason: SDUPTHER

## 2017-08-23 RX ORDER — OXYCODONE HYDROCHLORIDE 10 MG/1
10 TABLET ORAL EVERY 6 HOURS PRN
Qty: 120 TAB | Refills: 0 | Status: SHIPPED | OUTPATIENT
Start: 2017-09-23 | End: 2017-08-23 | Stop reason: SDUPTHER

## 2017-08-23 RX ORDER — LISINOPRIL 40 MG/1
40 TABLET ORAL DAILY
COMMUNITY
End: 2018-01-11 | Stop reason: SDUPTHER

## 2017-08-23 NOTE — PROGRESS NOTES
Subjective:   Priya Callahan is a 66 y.o. female here today for evaluation and management of:     Osteoarthritis of spine with radiculopathy, lumbar region  She has OA of spine and lumbar stenosis and lumbar radiculopathy that comes and goes.   She has been using a WC more, started needing on about 2 years ago.   She continues with PT and with pain management Dr. Arteaga.   She is having frequent falls. One resulted in fracture of left Tib Fib and ankle in April 2017  She had more recent fall on Saturday. She lives alone.   She needs a front wheeled walker. She should also be in assisted living.         COPD (chronic obstructive pulmonary disease) (CMS-[facility])  She is on continuous oxygen 4L  Needs a compressor for mobility  She continues on spiriva and symbicort.   She quit smoking  She did not need oxygen during the day before hospitalization but since hosp for tib fib fx, surgery and rehab with discharge home July 3rd, she has need for continuous oxygen 4 L          Current medicines (including changes today)  Current Outpatient Prescriptions   Medication Sig Dispense Refill   • lisinopril (PRINIVIL, ZESTRIL) 40 MG tablet Take 40 mg by mouth every day.     • Biotin 5000 MCG Cap Take  by mouth.     • Misc. Devices Misc Front Wheeled walker. 1 Units 0   • Misc. Devices Misc Simply Go Mini air compressor With associated supplies needed. 1 Units 0   • [START ON 10/23/2017] oxycodone immediate release (ROXICODONE) 10 MG immediate release tablet Take 1 Tab by mouth every 6 hours as needed for Moderate Pain for up to 30 days. 120 Tab 0   • cyclobenzaprine (FLEXERIL) 10 MG Tab      • escitalopram (LEXAPRO) 10 MG Tab Take 1 Tab by mouth every day. 30 Tab 5   • trazodone (DESYREL) 100 MG Tab TAKE 3 TABLETS BY MOUTH NIGHTLY AT BEDTIME AS NEEDED FOR SLEEP 90 Tab 0   • budesonide-formoterol (SYMBICORT) 160-4.5 MCG/ACT Aerosol Inhale 2 Puffs by mouth 2 Times a Day. 1 Inhaler 11   • amlodipine (NORVASC) 10 MG Tab Take  1 Tab by mouth every day. 90 Tab 1   • omeprazole (PRILOSEC) 40 MG delayed-release capsule Take 1 Cap by mouth every day. 30 Cap    • Cyanocobalamin (VITAMIN B-12) 1000 MCG Tab Take 1,000 mcg by mouth every evening.     • Cholecalciferol (VITAMIN D3) 2000 UNITS Tab Take 2,000 Units by mouth every day.     • duloxetine (CYMBALTA) 60 MG Cap DR Particles delayed-release capsule Take 1 Cap by mouth every day. 30 Cap 1   • levothyroxine (SYNTHROID) 75 MCG Tab TAKE 1 TABLET BY MOUTH DAILY 90 Tab 3   • ADVAIR DISKUS 500-50 MCG/DOSE AEROSOL POWDER, BREATH ACTIVATED      • trazodone (DESYREL) 150 MG Tab Take 2 Tabs by mouth every bedtime. 60 Tab 5   • hydrochlorothiazide (HYDRODIURIL) 25 MG Tab Take 1 Tab by mouth every day. 30 Tab 5   • Blood Glucose Monitoring Suppl (FREESTYLE LITE) Device Check blood sugars three times daily. 1 Device 0   • gabapentin (NEURONTIN) 600 MG tablet Take 1 Tab by mouth 3 times a day. 270 Tab 1   • nystatin/triamcinolone (MYCOLOG) 018822-3.1 UNIT/GM-% Cream Apply in a thin layer to fungal rash once per day prn 60 g 1   • carvedilol (COREG) 3.125 MG Tab Take 1 Tab by mouth 2 times a day, with meals. 180 Tab 0   • predniSONE (DELTASONE) 20 MG Tab Take 2 Tabs by mouth every day. Take 40 mg for 5 days, then  Take 30 mg for 3 days then  Take 20 mg for 3 days then  Take 10 mg for 2 days then  Take 5 mg for 2 days and then stop. 15 Tab 0   • insulin lispro (HUMALOG) 100 UNIT/ML Solution Inject 3-12 Units as instructed 4 Times a Day,Before Meals and at Bedtime.     • Lactobacillus-Inulin ([facility]IG[facility] PO) Take 1 Tab by mouth every day.     • cyanocobalamin (VITAMIN B-12) 1000 MCG/ML Solution 1,000 mcg by Intramuscular route every Saturday.     • lorazepam (ATIVAN) 0.5 MG Tab Take 0.5 mg by mouth every evening.     • ipratropium-albuterol (COMBIVENT RESPIMAT)  MCG/ACT Aero Soln Inhale 1 Puff by mouth every 6 hours as needed. Uses prn only     • folic acid (FOLVITE) 800 MCG tablet  "Take 800 mcg by mouth every day.     • multivitamin (THERAGRAN) Tab Take 1 Tab by mouth every day.     • docusate sodium (COLACE) 100 MG Cap Take 100 mg by mouth 2 times a day.     • senna-docusate (PERICOLACE OR SENOKOT S) 8.6-50 MG Tab Take 1 Tab by mouth every evening.     • tiotropium (SPIRIVA) 18 MCG Cap Inhale 1 Cap by mouth every day. 30 Cap 3   • insulin glargine (LANTUS) 100 UNIT/ML Solution Inject 22 Units as instructed every evening.     • montelukast (SINGULAIR) 10 MG Tab Take 1 Tab by mouth every day. 90 Tab 3     No current facility-administered medications for this visit.      She  has a past medical history of Arthritis; ASTHMA; Backpain; Breath shortness; Bronchitis; Congestive heart failure (CMS-HCC) (2013); COPD; Diabetes; Disorder of thyroid; Fall; Fibromyalgia; GERD (gastroesophageal reflux disease); Heart burn; Hepatitis C; Hypertension; Indigestion; Infectious disease; Neuropathy (CMS-HCC); On home oxygen therapy; Other specified symptom associated with female genital organs; Pain (9/13/2016); PND (post-nasal drip); Pneumonia; Psychiatric problem (9/13/2016); RLS (restless legs syndrome); Sleep apnea; and Unspecified urinary incontinence.    ROS  No chest pain, no shortness of breath, no abdominal pain       Objective:     Blood pressure 118/76, pulse 62, temperature (!) 35.8 °C (96.4 °F), resp. rate 16, height 1.651 m (5' 5\"), weight 99.8 kg (220 lb), SpO2 92 %. Body mass index is 36.61 kg/m².   Physical Exam:  Constitutional: Alert, no distress.sitting in WC with continuous oxygen.   Skin: Warm, dry, good turgor, healing abrasions scattered on left elbow, right middle toe, right knee, multiple bruises healing over arms and legs.   Eye: Equal, round and reactive, conjunctiva clear, lids normal.  ENMT: Lips without lesions, good dentition, oropharynx clear.  Neck: Trachea midline, no masses, no thyromegaly. No cervical or supraclavicular lymphadenopathy  Respiratory: Unlabored respiratory " effort, lungs clear to auscultation, no wheezes, no ronchi.  Cardiovascular: Normal S1, S2, no murmur, no edema.  Abdomen: Soft, non-tender, no masses, no hepatosplenomegaly.  Psych: Alert and oriented x3, normal affect and mood.        Assessment and Plan:   The following treatment plan was discussed    1. Osteoarthritis of spine with radiculopathy, lumbar region  Frequent falls with recent tib fib fracture. Needs front wheeled walker for stability.   - Misc. Devices Misc; Front Wheeled walker.  Dispense: 1 Units; Refill: 0    2. Falls frequently  Stop Gabapentin. No pain relief with it and more falls on gabapentin.  - Misc. Devices Misc; Front Wheeled walker.  Dispense: 1 Units; Refill: 0    3. Chronic pain syndrome  Pain controlled on current medications. Roxicodone IR 10 mg advised her to minimize use of this to avoid sedation.     4. Pulmonary emphysema, unspecified emphysema type (CMS-HCC)  Stable on current inhalers and continuous oxygen at 4 L.   Needs compressor for oxygen.   - Misc. Devices Misc; Simply Go Mini air compressor With associated supplies needed.  Dispense: 1 Units; Refill: 0      Followup: Return in about 3 months (around 11/23/2017) for Falls, COPD, OA, lumbar stenosis, pain med refills.

## 2017-08-23 NOTE — ASSESSMENT & PLAN NOTE
She has OA of spine and lumbar stenosis and lumbar radiculopathy that comes and goes.   She has been using a WC more, started needing on about 2 years ago.   She continues with PT and with pain management Dr. Arteaga.   She is having frequent falls. One resulted in fracture of left Tib Fib and ankle in April 2017  She had more recent fall on Saturday. She lives alone.   She needs a front wheeled walker. She should also be in assisted living.

## 2017-08-23 NOTE — MR AVS SNAPSHOT
"        Priya Callahan   2017 1:20 PM   Office Visit   MRN: [id number]    Department:  [facility]   Dept Phone:  [phone]    Description:  Female : 1950   Provider:  Kavitha Mccall M.D.           Reason for Visit     Hospital Follow-up fv medications-discuss CBD oil, letter of disability for post office      Allergies as of 2017     Allergen Noted Reactions    Vitamin C 10/29/2013   Diarrhea    \"violent diarrhea\"    Keflex 10/29/2013   Rash    Severe rash, but TOLERATES PENICILLINS, Rocephin     Codeine 10/29/2013   Nausea    Severe stomach pain    Lyrica 10/29/2013   Nausea    Nausea, dizzy    Sudafed 10/29/2013   Nausea, Unspecified    Chest pain, nausea      You were diagnosed with     Osteoarthritis of spine with radiculopathy, lumbar region   [1249969]       Falls frequently   [264734]       Chronic pain syndrome   [338.4.ICD-9-CM]       Pulmonary emphysema, unspecified emphysema type (CMS-HCC)   [7664313]         Vital Signs     Blood Pressure Pulse Temperature Respirations Height Weight    118/76 mmHg 62 35.8 °C (96.4 °F) 16 1.651 m (5' 5\") 99.791 kg (220 lb)    Body Mass Index Oxygen Saturation Smoking Status             36.61 kg/m2 92% Former Smoker         Basic Information     Date Of Birth Sex Race Ethnicity Preferred Language    1950 Female White Non- English      Your appointments     Sep 05, 2017  1:30 PM   BONE DENSITY (DEXASCAN) with [facility] BD 1   [facility] ([address])    [address] [place] [id number] [phone]           No calcium supplements 24 hours prior to exam, including antacids or multivitamins w/calcium.  Pt may eat and drink normally.  No injection procedures prior to Dexa on the same day.  No barium contrast, no CTs with IV or oral contrast, no Pet/CTs and no Nuc Med injections for 7 days prior to a Dexa (including Barium Swallow, Upper GI, Small Bowel Series).  If scheduling a Dexa on " the same day as a CT with IV or oral contrast, any test with barium, Pet/CT or a Nuc Med with injection, always schedule the Dexa before the other study.            Nov 02, 2017  2:00 PM   New Patient with Vito Burns M.D.   [facility] (--)    [address], [address]  [facility] [id number] [phone]           Please bring Photo ID, Insurance Cards, All Medication Bottles and copies of any legal documents (such as Living Will, Power of ) If speaking a language besides English please bring an adult . Please arrive 30 minutes prior for check in and registration. You will be receiving a confirmation call a few days before your appointment from our automated call confirmation system.            Nov 15, 2017  1:20 PM   Established Patient with Kavitha Mccall M.D.   [facility] [place]    [facility] [address] [phone]           You will be receiving a confirmation call a few days before your appointment from our automated call confirmation system.            Dec 21, 2017  2:00 PM   Hematology Est Patient with Brian Olea M.D.   [facility] (--)    [address], [address]  [facility] [id number] [phone]              Problem List              ICD-10-CM Priority Class Noted - Resolved    Morbid obesity (CMS-HCC) (Chronic) E66.01 Low  10/30/2013 - Present    DM type 2, uncontrolled, with renal complications (CMS-[facility]) (Chronic) E11.29, E11.65 Medium  10/30/2013 - Present    Chronic pain G89.29 Low  10/30/2013 - Present    Iron deficiency anemia D50.9 Medium  10/30/2013 - Present    Acute on chronic diastolic CHF (congestive heart failure), NYHA class 1 (CMS-[facility]) I50.33   11/20/2013 - Present    Major depressive disorder (CMS-[facility]) (Chronic) F32.9 Low  11/20/2013 - Present    COPD (chronic obstructive pulmonary disease) (CMS-[facility]) (Chronic) J44.9 High  11/20/2013 - Present    HTN (hypertension) I10 Low   11/20/2013 - Present    Chronic kidney disease, stage 3 N18.3 Low  1/6/2014 - Present    Chronic knee pain M25.569, G89.29   2/25/2014 - Present    Osteoarthritis M19.90 Low  2/25/2014 - Present    Insomnia G47.00 Low  2/25/2014 - Present    Hair loss L65.9   2/25/2014 - Present    Hypothyroidism (Chronic) E03.9 Low  4/1/2014 - Present    Vitamin D deficiency (Chronic) E55.9 Low  6/12/2014 - Present    Lumbar spondylosis with myelopathy M47.16   6/17/2014 - Present    Nocturnal hypoxia G47.34   7/21/2014 - Present    Pain of right heel M79.671   7/21/2014 - Present    Chronic pain syndrome (Chronic) G89.4 Low  8/28/2014 - Present    EDITH (obstructive sleep apnea) (Chronic) G47.33 Low  1/27/2015 - Present    Hypertensive heart disease with heart failure (CMS-HCC) (Chronic) I11.0 High  4/27/2015 - Present    Chronic respiratory failure (CMS-HCC) J96.10 Low  4/28/2015 - Present    Chronic constipation K59.09   7/7/2015 - Present    Pulmonary nodules R91.8   7/7/2015 - Present    Osteoarthritis, knee M17.10   9/2/2015 - Present    Osteoarthritis of spine with radiculopathy, lumbar region M47.26   10/2/2015 - Present    Bilateral hand pain M79.641, M79.642   12/21/2015 - Present    Primary osteoarthritis of both hands M19.041, M19.042   12/21/2015 - Present    Gastroesophageal reflux disease with esophagitis K21.0 Low  12/22/2015 - Present    Myoclonic jerking G25.3   4/12/2016 - Present    Cough R05   8/18/2016 - Present    Other spondylosis with radiculopathy, lumbar region M47.26   9/16/2016 - Present    Tobacco abuse disorder Z72.0   9/27/2016 - Present    Toenail fungus B35.1   11/21/2016 - Present    Septic shock (CMS-HCC) A41.9, R65.21 High  3/7/2017 - Present    Hyponatremia E87.1   3/8/2017 - Present    Hyperkalemia E87.5   3/8/2017 - Present    Acute on chronic renal failure (CMS-[facility]) N17.9, N18.9   3/8/2017 - Present    Acute on chronic respiratory failure (CMS-HCC) J96.20   3/8/2017 - Present    Severe sepsis  with septic shock (CMS-HCC) A41.9, R65.21   3/8/2017 - Present    Diabetes mellitus, type II (CMS-HCC) (Chronic) E11.9 Low  4/4/2017 - Present    HAN (acute kidney injury) (CMS-HCC) N17.9 Medium  4/4/2017 - Present    CKD (chronic kidney disease), stage II (Chronic) N18.2 Medium  4/4/2017 - Present    HLD (hyperlipidemia) (Chronic) E78.5 Low  4/4/2017 - Present    Encephalopathy G93.40 High  4/4/2017 - Present    Neuropathy (CMS-HCC) G62.9 Low  4/4/2017 - Present    Leukocytosis D72.829 Low  4/4/2017 - Present    Hyperbilirubinemia E80.6 Medium  4/4/2017 - Present    Sepsis (CMS-HCC) A41.9   4/25/2017 - Present    Elevated troponin R74.8   4/25/2017 - Present    CAP (community acquired pneumonia) J18.9   4/26/2017 - Present    Tibia/fibula fracture (Chronic) S82.209A, S82.409A High  5/7/2017 - Present    Pneumonia J18.9 Medium  5/8/2017 - Present    Acute exacerbation of chronic obstructive pulmonary disease (COPD) (CMS-HCC) J44.1 Medium  5/8/2017 - Present    Anemia of chronic disease (Chronic) D63.8   5/27/2017 - Present    Anemia requiring transfusions D64.9   5/27/2017 - Present    Hypoxia R09.02   6/5/2017 - Present    Acute respiratory failure requiring reintubation (CMS-HCC)  (COPD exacerbation, infiltrates due to infectious vs inflammatory pneumonia?)  J96.00 High  6/22/2017 - Present    Chronic hypertension (Chronic) I10 Medium  6/30/2017 - Present    Atrial fibrillation (CMS-HCC) (Chronic) I48.91 High  6/30/2017 - Present    DM2 (diabetes mellitus, type 2) (CMS-HCC) (Chronic) E11.9 Low  6/30/2017 - Present    Thyroiditis E06.9 Low  6/30/2017 - Present    Severe sepsis (CMS-HCC) (Pneumonia, acute respiratory failure)  A41.9, R65.20 High  6/30/2017 - Present      Health Maintenance        Date Due Completion Dates    RETINAL SCREENING 9/26/1967 ---    IMM DTaP/Tdap/Td Vaccine (1 - Tdap) 9/26/1968 ---    IMM ZOSTER VACCINE 9/26/2009 ---    BONE DENSITY 9/26/2014 ---    MAMMOGRAM 2/17/2016 2/17/2015     DIABETES MONOFILAMENT / LE EXAM 6/7/2017 6/7/2016    IMM INFLUENZA (1) 9/1/2017 9/27/2016, 11/11/2013    A1C SCREENING 12/21/2017 6/21/2017, 3/7/2017, 1/26/2017, 5/27/2016, 12/17/2015, 8/11/2015, 2/24/2015, 6/2/2014, 8/31/2012    URINE ACR / MICROALBUMIN 1/26/2018 1/26/2017, 12/17/2015, 2/24/2015, 8/31/2012    COLON CANCER SCREENING ANNUAL FIT 4/3/2018 4/3/2017, 12/28/2015, 3/7/2015    FASTING LIPID PROFILE 5/15/2018 5/15/2017, 3/8/2017, 1/26/2017, 12/17/2015, 2/24/2015, 6/2/2014, 8/31/2012    SERUM CREATININE 7/3/2018 7/3/2017, 7/2/2017, 6/30/2017, 6/30/2017, 6/29/2017, 6/28/2017, 6/27/2017, 6/26/2017, 6/25/2017, 6/24/2017, 6/23/2017, 6/22/2017, 6/21/2017, 6/20/2017, 6/18/2017, 6/17/2017, 6/15/2017, 6/14/2017, 6/12/2017, 6/10/2017, 6/9/2017, 6/8/2017, 6/7/2017, 6/6/2017, 6/5/2017, 6/4/2017, 6/3/2017, 6/2/2017, 5/31/2017, 5/27/2017, 5/23/2017, 5/21/2017, 5/19/2017, 5/11/2017, 5/10/2017, 5/9/2017, 5/8/2017, 5/7/2017, 5/3/2017, 5/1/2017, 4/30/2017, 4/29/2017, 4/28/2017, 4/26/2017, 4/25/2017, 4/24/2017, 4/9/2017, 4/8/2017, 4/6/2017, 4/5/2017, 4/2/2017, 4/1/2017, 3/17/2017, 3/16/2017, 3/15/2017, 3/14/2017, 3/13/2017, 3/12/2017, 3/11/2017, 3/10/2017, 3/9/2017, 3/8/2017, 3/7/2017, 1/26/2017, 5/27/2016, 12/17/2015, 8/11/2015, 5/8/2015, 3/19/2015, 2/27/2015, 2/24/2015, 6/2/2014, 1/13/2014, 11/22/2013, 11/21/2013, 11/20/2013, 11/19/2013, 11/14/2013, 11/13/2013, 11/12/2013, 11/11/2013, 11/10/2013, 11/9/2013, 11/8/2013, 11/7/2013, 11/6/2013, 11/5/2013, 11/4/2013, 11/3/2013, 11/2/2013, 11/1/2013, 10/31/2013, 10/30/2013, 10/29/2013, 8/31/2012            Current Immunizations     13-VALENT PCV PREVNAR 9/28/2016    Influenza TIV (IM) 9/27/2016, 11/11/2013 11:45 AM    Pneumococcal polysaccharide vaccine (PPSV-23) 12/11/2014, 10/22/2013      Below and/or attached are the medications your provider expects you to take. Review all of your home medications and newly ordered medications with your provider and/or pharmacist. Follow  medication instructions as directed by your provider and/or pharmacist. Please keep your medication list with you and share with your provider. Update the information when medications are discontinued, doses are changed, or new medications (including over-the-counter products) are added; and carry medication information at all times in the event of emergency situations     Allergies:  VITAMIN C - Diarrhea     KEFLEX - Rash     CODEINE - Nausea     LYRICA - Nausea     SUDAFED - Nausea,Unspecified               Medications  Valid as of: August 23, 2017 -  2:43 PM    Generic Name Brand Name Tablet Size Instructions for use    AmLODIPine Besylate (Tab) NORVASC 10 MG Take 1 Tab by mouth every day.        Biotin (Cap) Biotin 5000 MCG Take  by mouth.        Blood Glucose Monitoring Suppl (Device) FREESTYLE LITE  Check blood sugars three times daily.        Budesonide-Formoterol Fumarate (Aerosol) SYMBICORT 160-4.5 MCG/ACT Inhale 2 Puffs by mouth 2 Times a Day.        Carvedilol (Tab) COREG 3.125 MG Take 1 Tab by mouth 2 times a day, with meals.        Cholecalciferol (Tab) Vitamin D3 2000 units Take 2,000 Units by mouth every day.        Cyanocobalamin (Tab) vitamin B-12 1000 MCG Take 1,000 mcg by mouth every evening.        Cyanocobalamin (Solution) VITAMIN B-12 1000 MCG/ML 1,000 mcg by Intramuscular route every Saturday.        Cyclobenzaprine HCl (Tab) FLEXERIL 10 MG         Docusate Sodium (Cap) COLACE 100 MG Take 100 mg by mouth 2 times a day.        DULoxetine HCl (Cap DR Particles) CYMBALTA 60 MG Take 1 Cap by mouth every day.        Escitalopram Oxalate (Tab) LEXAPRO 10 MG Take 1 Tab by mouth every day.        Fluticasone-Salmeterol (AEROSOL POWDER, BREATH ACTIVATED) ADVAIR DISKUS 500-50 MCG/DOSE         Folic Acid (Tab) FOLVITE 800 MCG Take 800 mcg by mouth every day.        Gabapentin (Tab) NEURONTIN 600 MG Take 1 Tab by mouth 3 times a day.        HydroCHLOROthiazide (Tab) HYDRODIURIL 25 MG Take 1 Tab by mouth  every day.        Insulin Glargine (Solution) LANTUS 100 UNIT/ML Inject 22 Units as instructed every evening.        Insulin Lispro (Solution) HUMALOG 100 UNIT/ML Inject 3-12 Units as instructed 4 Times a Day,Before Meals and at Bedtime.        Ipratropium-Albuterol (Aero Soln) COMBIVENT RESPIMAT  MCG/ACT Inhale 1 Puff by mouth every 6 hours as needed. Uses prn only        Lactobacillus-Inulin   Take 1 Tab by mouth every day.        Levothyroxine Sodium (Tab) SYNTHROID 75 MCG TAKE 1 TABLET BY MOUTH DAILY        Lisinopril (Tab) PRINIVIL, ZESTRIL 40 MG Take 40 mg by mouth every day.        LORazepam (Tab) ATIVAN 0.5 MG Take 0.5 mg by mouth every evening.        Misc. Devices (Misc) Misc. Devices  Front Wheeled walker.        Misc. Devices (Misc) Misc. Devices  Simply Go Mini air compressor With associated supplies needed.        Montelukast Sodium (Tab) SINGULAIR 10 MG Take 1 Tab by mouth every day.        Multiple Vitamin (Tab) THERAGRAN  Take 1 Tab by mouth every day.        Nystatin-Triamcinolone (Cream) MYCOLOG 483400-4.1 UNIT/GM-% Apply in a thin layer to fungal rash once per day prn        Omeprazole (CAPSULE DELAYED RELEASE) PRILOSEC 40 MG Take 1 Cap by mouth every day.        OxyCODONE HCl (Tab) ROXICODONE 10 MG Take 1 Tab by mouth every 6 hours as needed for Moderate Pain for up to 30 days.        PredniSONE (Tab) DELTASONE 20 MG Take 2 Tabs by mouth every day. Take 40 mg for 5 days, then  Take 30 mg for 3 days then  Take 20 mg for 3 days then  Take 10 mg for 2 days then  Take 5 mg for 2 days and then stop.        Sennosides-Docusate Sodium (Tab) PERICOLACE or SENOKOT S 8.6-50 MG Take 1 Tab by mouth every evening.        Tiotropium Bromide Monohydrate (Cap) SPIRIVA 18 MCG Inhale 1 Cap by mouth every day.        TraZODone HCl (Tab) DESYREL 150 MG Take 2 Tabs by mouth every bedtime.        TraZODone HCl (Tab) DESYREL 100 MG TAKE 3 TABLETS BY MOUTH NIGHTLY AT BEDTIME AS NEEDED FOR SLEEP        .                  Medicines prescribed today were sent to:     [facility] - [place], [place] - [address]    [address] [place] [address]    Phone: [phone] Fax: [phone]    Open 24 Hours?: No      Medication refill instructions:       If your prescription bottle indicates you have medication refills left, it is not necessary to call your provider’s office. Please contact your pharmacy and they will refill your medication.    If your prescription bottle indicates you do not have any refills left, you may request refills at any time through one of the following ways: The online [employer] system (except Urgent Care), by calling your provider’s office, or by asking your pharmacy to contact your provider’s office with a refill request. Medication refills are processed only during regular business hours and may not be available until the next business day. Your provider may request additional information or to have a follow-up visit with you prior to refilling your medication.   *Please Note: Medication refills are assigned a new Rx number when refilled electronically. Your pharmacy may indicate that no refills were authorized even though a new prescription for the same medication is available at the pharmacy. Please request the medicine by name with the pharmacy before contacting your provider for a refill.        Your To Do List     Future Labs/Procedures Complete By Expires    COMP METABOLIC PANEL  As directed 8/24/2018    MR-LUMBAR SPINE-W/O  As directed 8/23/2018      Other Notes About Your Plan     On pain contract with Dr. Lawson  Last UDS: 3/26/2015 Dr Lawson  Contolled Substance agreement signed: 8/28/2014    Comprehensive Medication Review: [place] [facility]18.[place] [facility] Status: Patient Declined

## 2017-08-23 NOTE — ASSESSMENT & PLAN NOTE
She is on continuous oxygen 4L  Needs a compressor for mobility  She continues on spiriva and symbicort.   She quit smoking  She did not need oxygen during the day before hospitalization but since hosp for tib fib fx, surgery and rehab with discharge home July 3rd, she has need for continuous oxygen 4 L

## 2017-08-25 ENCOUNTER — TELEPHONE (OUTPATIENT)
Dept: MEDICAL GROUP | Facility: PHYSICIAN GROUP | Age: 67
End: 2017-08-25

## 2017-08-25 NOTE — TELEPHONE ENCOUNTER
Dr. Mccall- I don't see you've ever prescribed this med or any [facility] provider. Please refill as you see fit.

## 2017-08-25 NOTE — TELEPHONE ENCOUNTER
Orders for Simply Go Mini air compressor faxed to [facility] [phone]

## 2017-08-29 ENCOUNTER — TELEPHONE (OUTPATIENT)
Dept: HEALTH INFORMATION MANAGEMENT | Facility: OTHER | Age: 67
End: 2017-08-29

## 2017-08-29 ENCOUNTER — PATIENT OUTREACH (OUTPATIENT)
Dept: HEALTH INFORMATION MANAGEMENT | Facility: OTHER | Age: 67
End: 2017-08-29

## 2017-08-29 RX ORDER — INSULIN GLARGINE 100 [IU]/ML
INJECTION, SOLUTION SUBCUTANEOUS
Qty: 5 PEN | Refills: 0 | Status: ON HOLD | OUTPATIENT
Start: 2017-08-29 | End: 2017-11-12

## 2017-08-29 NOTE — PROGRESS NOTES
One month follow-up: LSW outreach to pt to see how she is doing and inform pt re: [place] LSW being able to visit her.     Inquired how pt was doing. Pt reports that she is doing fine. Pt reports still having [facility] for PT/OT. LSW discussed that this LSW spoke to [place] LSW who reported that she is not able to come see pt for just a need of applying for Medicaid, but that there would need to be other things pt needed assistance with.     LSW inquired if pt has been able to afford her medications/supplies. Pt reports that her needles for her diabetes are pretty expensive as they are around $45. Pt denies difficulty paying for any of her other medications.     LSW inquired if pt anticipates needing assistance in the home once [facility] discharges. Pt reports she will need assistance with cleaning (ie: vacuuming, dishes, etc.). She reports that she thinks she will be able to perform her IADL's such as bathing, brushing her hair, dressing; however, does report being fearful of taking showers due to previous falls. Pt does report having shower supplies, such as grab bars in place in her shower.     Informed pt that this LSW will request her provider place an order for the [place] LSW to come assess pt for needs and provide pt with some resources as pt has expressed being in need of resources once discharged from [facility]. Pt agreeable and thinks that this will be very helpful.     Plan:  LSW to request pt's provider place an order for an LSW through [facility] to evaluate and treat pt's above social needs

## 2017-08-30 DIAGNOSIS — E11.8 TYPE 2 DIABETES MELLITUS WITH COMPLICATION, UNSPECIFIED LONG TERM INSULIN USE STATUS: ICD-10-CM

## 2017-08-31 NOTE — TELEPHONE ENCOUNTER
Referral to home health done for LSW to assess and treat for social service needs.   Please fax to [facility].  Kavitha Mccall M.D.

## 2017-09-04 RX ORDER — HYDROCORTISONE 20 MG/1
20 TABLET ORAL DAILY
Qty: 30 TAB | OUTPATIENT
Start: 2017-09-04

## 2017-09-05 ENCOUNTER — HOSPITAL ENCOUNTER (OUTPATIENT)
Dept: RADIOLOGY | Facility: MEDICAL CENTER | Age: 67
End: 2017-09-05
Attending: FAMILY MEDICINE
Payer: MEDICARE

## 2017-09-05 DIAGNOSIS — M47.26 OSTEOARTHRITIS OF SPINE WITH RADICULOPATHY, LUMBAR REGION: ICD-10-CM

## 2017-09-05 DIAGNOSIS — Z78.0 MENOPAUSE: ICD-10-CM

## 2017-09-05 PROCEDURE — 72148 MRI LUMBAR SPINE W/O DYE: CPT

## 2017-09-05 PROCEDURE — 77080 DXA BONE DENSITY AXIAL: CPT

## 2017-09-06 ENCOUNTER — TELEPHONE (OUTPATIENT)
Dept: MEDICAL GROUP | Facility: PHYSICIAN GROUP | Age: 67
End: 2017-09-06

## 2017-09-06 NOTE — TELEPHONE ENCOUNTER
1. Caller Name: Jade ([facility])                      Call Back Number: [phone]    2. Message: Jade called and stated that they are continuing care with skilled nursing for PT and O2.    3. Patient approves office to leave a detailed voicemail/MyChart message: N\A

## 2017-09-19 ENCOUNTER — TELEPHONE (OUTPATIENT)
Dept: MEDICAL GROUP | Facility: PHYSICIAN GROUP | Age: 67
End: 2017-09-19

## 2017-09-20 ENCOUNTER — TELEPHONE (OUTPATIENT)
Dept: MEDICAL GROUP | Facility: PHYSICIAN GROUP | Age: 67
End: 2017-09-20

## 2017-09-20 RX ORDER — CYCLOBENZAPRINE HCL 10 MG
10 TABLET ORAL 2 TIMES DAILY PRN
Qty: 30 TAB | Refills: 3 | Status: ON HOLD | OUTPATIENT
Start: 2017-09-20 | End: 2017-11-12

## 2017-09-25 ENCOUNTER — HOSPITAL ENCOUNTER (OUTPATIENT)
Facility: MEDICAL CENTER | Age: 67
End: 2017-09-25
Payer: MEDICARE

## 2017-09-25 ENCOUNTER — APPOINTMENT (OUTPATIENT)
Dept: SOCIAL WORK | Facility: CLINIC | Age: 67
End: 2017-09-25
Payer: MEDICARE

## 2017-09-25 LAB
CREAT UR-MCNC: 200.2 MG/DL
EST. AVERAGE GLUCOSE BLD GHB EST-MCNC: 154 MG/DL
HBA1C MFR BLD: 7 % (ref 0–5.6)
MICROALBUMIN UR-MCNC: 228.5 MG/DL
MICROALBUMIN/CREAT UR: 1141 MG/G (ref 0–30)

## 2017-09-25 PROCEDURE — 90736 HZV VACCINE LIVE SUBQ: CPT | Performed by: REGISTERED NURSE

## 2017-09-25 PROCEDURE — 82043 UR ALBUMIN QUANTITATIVE: CPT

## 2017-09-25 PROCEDURE — 83036 HEMOGLOBIN GLYCOSYLATED A1C: CPT

## 2017-09-25 PROCEDURE — 90662 IIV NO PRSV INCREASED AG IM: CPT | Performed by: REGISTERED NURSE

## 2017-09-25 PROCEDURE — 82570 ASSAY OF URINE CREATININE: CPT

## 2017-09-25 PROCEDURE — G0008 ADMIN INFLUENZA VIRUS VAC: HCPCS | Performed by: REGISTERED NURSE

## 2017-09-25 PROCEDURE — 90472 IMMUNIZATION ADMIN EACH ADD: CPT | Performed by: REGISTERED NURSE

## 2017-09-28 ENCOUNTER — OFFICE VISIT (OUTPATIENT)
Dept: MEDICAL GROUP | Facility: PHYSICIAN GROUP | Age: 67
End: 2017-09-28
Payer: MEDICARE

## 2017-09-28 ENCOUNTER — APPOINTMENT (OUTPATIENT)
Dept: RADIOLOGY | Facility: IMAGING CENTER | Age: 67
End: 2017-09-28
Attending: FAMILY MEDICINE
Payer: MEDICARE

## 2017-09-28 ENCOUNTER — NON-PROVIDER VISIT (OUTPATIENT)
Dept: URGENT CARE | Facility: PHYSICIAN GROUP | Age: 67
End: 2017-09-28
Payer: MEDICARE

## 2017-09-28 VITALS
HEIGHT: 65 IN | WEIGHT: 216 LBS | TEMPERATURE: 98.2 F | SYSTOLIC BLOOD PRESSURE: 112 MMHG | HEART RATE: 74 BPM | DIASTOLIC BLOOD PRESSURE: 64 MMHG | BODY MASS INDEX: 35.99 KG/M2 | RESPIRATION RATE: 18 BRPM | OXYGEN SATURATION: 95 %

## 2017-09-28 DIAGNOSIS — G89.29 CHRONIC MIDLINE LOW BACK PAIN, WITH SCIATICA PRESENCE UNSPECIFIED: ICD-10-CM

## 2017-09-28 DIAGNOSIS — M25.572 CHRONIC PAIN OF LEFT ANKLE: ICD-10-CM

## 2017-09-28 DIAGNOSIS — L82.1 SEBORRHEIC KERATOSIS: ICD-10-CM

## 2017-09-28 DIAGNOSIS — G89.29 CHRONIC PAIN OF LEFT ANKLE: ICD-10-CM

## 2017-09-28 DIAGNOSIS — R39.198 ABNORMALITY OF URINATION: ICD-10-CM

## 2017-09-28 DIAGNOSIS — Z12.31 ENCOUNTER FOR SCREENING MAMMOGRAM FOR MALIGNANT NEOPLASM OF BREAST: ICD-10-CM

## 2017-09-28 DIAGNOSIS — M54.5 CHRONIC MIDLINE LOW BACK PAIN, WITH SCIATICA PRESENCE UNSPECIFIED: ICD-10-CM

## 2017-09-28 DIAGNOSIS — R49.0 HOARSE VOICE QUALITY: ICD-10-CM

## 2017-09-28 PROBLEM — R30.0 DYSURIA: Status: ACTIVE | Noted: 2017-09-28

## 2017-09-28 PROCEDURE — 99214 OFFICE O/P EST MOD 30 MIN: CPT | Performed by: FAMILY MEDICINE

## 2017-09-28 PROCEDURE — 73610 X-RAY EXAM OF ANKLE: CPT | Mod: TC,LT | Performed by: FAMILY MEDICINE

## 2017-09-28 PROCEDURE — 73590 X-RAY EXAM OF LOWER LEG: CPT | Mod: TC,LT | Performed by: FAMILY MEDICINE

## 2017-09-28 RX ORDER — LIDOCAINE 50 MG/G
1 PATCH TOPICAL EVERY 24 HOURS
Qty: 10 PATCH | Refills: 6 | Status: ON HOLD | OUTPATIENT
Start: 2017-09-28 | End: 2017-11-12

## 2017-09-28 ASSESSMENT — PATIENT HEALTH QUESTIONNAIRE - PHQ9: CLINICAL INTERPRETATION OF PHQ2 SCORE: 0

## 2017-09-28 NOTE — ASSESSMENT & PLAN NOTE
She wakes 6 times a night to urinate and also every hour during the day. This is only since her ICU stay with urinary catheterization in August. She also had diarrhea during that time with a urine infection in the hospital.   Currently no dysuria or hematuria, nausea or vomiting. She has intermittent mild abdominal pain and chronic back pain due to OA.   She has abnormal flow with only dribbling.   Possibly she is not emptying fully and so with increased frequency  Will check UA, may need ultrasound and referral to urology for scope of urethra to check for strictures.

## 2017-09-28 NOTE — ASSESSMENT & PLAN NOTE
This is a 67 year old woman who presents with persistent hoarse voice and weak voice and loss of voice in the middle of conversations. She was intubated for about 3 weeks in August due to sepsis and septic shock.     She is on symbicort and spiriva for COPD.   Advised her to gargle after use of inhalers. She is on continuous oxygen supplementation.     Will refer to ENT for scope of vocal cords.

## 2017-09-28 NOTE — ASSESSMENT & PLAN NOTE
Patient is worried about a skin lesion on her left mid back, it is under her bra strap. Examination reveals a seborrheic keratosis with classic waxy stuck on appearance. Patient reassured since abraded and irritation with clothing will schedule for removal.

## 2017-09-29 NOTE — PROGRESS NOTES
Subjective:   Priya Callahan is a 67 y.o. female here today for evaluation and management of:     Hoarse voice quality  This is a 67 year old woman who presents with persistent hoarse voice and weak voice and loss of voice in the middle of conversations. She was intubated for about 3 weeks in August due to sepsis and septic shock.     She is on symbicort and spiriva for COPD.   Advised her to gargle after use of inhalers. She is on continuous oxygen supplementation.     Will refer to ENT for scope of vocal cords.       Abnormality of urination  She wakes 6 times a night to urinate and also every hour during the day. This is only since her ICU stay with urinary catheterization in August. She also had diarrhea during that time with a urine infection in the hospital.   Currently no dysuria or hematuria, nausea or vomiting. She has intermittent mild abdominal pain and chronic back pain due to OA.   She has abnormal flow with only dribbling.   Possibly she is not emptying fully and so with increased frequency  Will check UA, may need ultrasound and referral to urology for scope of urethra to check for strictures.     Chronic pain of left ankle  Patient fractured her left Tib Fib in April and had ORIF done. Since then she has been working at home with PT, walking with a cane now. Therapist and home health nurse are concerned about the persistent pain and skin discoloration and inflammation noted around left ankle. Patient feels like there is something inside her leg rubbing and causing pain, pain is worse when she walks.   Currently when seated no pain. She has not been using ice, so I recommended she use ice for inflammation. Will check xray.     Seborrheic keratosis  Patient is worried about a skin lesion on her left mid back, it is under her bra strap. Examination reveals a seborrheic keratosis with classic waxy stuck on appearance. Patient reassured since abraded and irritation with clothing will schedule  for removal.      Imaging done today  Left Tib/Fib  1.  Soft tissue swelling overlying the lateral malleolus.  2.  No acute left tibia or fibula fracture.  3.  Fixation hardware in the distal tibia and fibula.  4.  Left total arthroplasty.    Left ankle  1.  No acute left ankle fracture or dislocation.  2.  Lateral soft tissue swelling.  3.  ORIF changes as described.      Current medicines (including changes today)  Current Outpatient Prescriptions   Medication Sig Dispense Refill   • lidocaine (LIDODERM) 5 % Patch Apply 1 Patch to skin as directed every 24 hours. 10 Patch 6   • cyclobenzaprine (FLEXERIL) 10 MG Tab Take 1 Tab by mouth 2 times a day as needed. 30 Tab 3   • Insulin Pen Needle 33G X 8 MM Misc 3 Units by Does not apply route 3 times a day for 90 days. 100 Each 3   • LANTUS SOLOSTAR 100 UNIT/ML Solution Pen-injector injection INJECT 5-20 UNITS SUBCUTANEOUSLY EVERY EVENING 5 PEN 0   • Misc. Devices Misc Front Wheeled walker. 1 Units 0   • Misc. Devices Misc Simply Go Mini air compressor With associated supplies needed. 1 Units 0   • [START ON 10/23/2017] oxycodone immediate release (ROXICODONE) 10 MG immediate release tablet Take 1 Tab by mouth every 6 hours as needed for Moderate Pain for up to 30 days. 120 Tab 0   • trazodone (DESYREL) 100 MG Tab TAKE 3 TABLETS BY MOUTH NIGHTLY AT BEDTIME AS NEEDED FOR SLEEP 90 Tab 0   • Blood Glucose Monitoring Suppl (FREESTYLE LITE) Device Check blood sugars three times daily. 1 Device 0   • budesonide-formoterol (SYMBICORT) 160-4.5 MCG/ACT Aerosol Inhale 2 Puffs by mouth 2 Times a Day. 1 Inhaler 11   • nystatin/triamcinolone (MYCOLOG) 476466-8.1 UNIT/GM-% Cream Apply in a thin layer to fungal rash once per day prn 60 g 1   • amlodipine (NORVASC) 10 MG Tab Take 1 Tab by mouth every day. 90 Tab 1   • Lactobacillus-Inulin ([employer] DIGESTIVE HEALTH PO) Take 1 Tab by mouth every day.     • omeprazole (PRILOSEC) 40 MG delayed-release capsule Take 1 Cap by mouth every  "day. 30 Cap    • multivitamin (THERAGRAN) Tab Take 1 Tab by mouth every day.     • Cyanocobalamin (VITAMIN B-12) 1000 MCG Tab Take 1,000 mcg by mouth every evening.     • Cholecalciferol (VITAMIN D3) 2000 UNITS Tab Take 2,000 Units by mouth every day.     • tiotropium (SPIRIVA) 18 MCG Cap Inhale 1 Cap by mouth every day. 30 Cap 3   • duloxetine (CYMBALTA) 60 MG Cap DR Particles delayed-release capsule Take 1 Cap by mouth every day. 30 Cap 1   • levothyroxine (SYNTHROID) 75 MCG Tab TAKE 1 TABLET BY MOUTH DAILY 90 Tab 3   • lisinopril (PRINIVIL, ZESTRIL) 40 MG tablet Take 40 mg by mouth every day.     • Biotin 5000 MCG Cap Take  by mouth.     • senna-docusate (PERICOLACE OR SENOKOT S) 8.6-50 MG Tab Take 1 Tab by mouth every evening.       No current facility-administered medications for this visit.      She  has a past medical history of Arthritis; ASTHMA; Backpain; Breath shortness; Bronchitis; Congestive heart failure (CMS-[facility]) (2013); COPD; Diabetes; Disorder of thyroid; Fall; Fibromyalgia; GERD (gastroesophageal reflux disease); Heart burn; Hepatitis C; Hypertension; Indigestion; Infectious disease; Neuropathy (CMS-HCC); On home oxygen therapy; Other specified symptom associated with female genital organs; Pain (9/13/2016); PND (post-nasal drip); Pneumonia; Psychiatric problem (9/13/2016); RLS (restless legs syndrome); Sleep apnea; and Unspecified urinary incontinence.    ROS  No chest pain, no shortness of breath, no abdominal pain       Objective:     Blood pressure 112/64, pulse 74, temperature 36.8 °C (98.2 °F), resp. rate 18, height 1.651 m (5' 5\"), weight 98 kg (216 lb), SpO2 95 %. Body mass index is 35.94 kg/m².   Physical Exam:  Constitutional: Alert, no distress.  Skin: Warm, dry, good turgor, no rashes in visible areas.  Eye: Equal, round and reactive, conjunctiva clear, lids normal.  ENMT: Lips without lesions, good dentition, oropharynx clear.  Neck: Trachea midline, no masses, no thyromegaly. No " cervical or supraclavicular lymphadenopathy  Respiratory: Unlabored respiratory effort, lungs clear to auscultation, no wheezes, no ronchi.  Cardiovascular: Normal S1, S2, no murmur, no edema.  Abdomen: Soft, non-tender, no masses, no hepatosplenomegaly.  Psych: Alert and oriented x3, normal affect and mood.  MSK: left lower leg with chronic changes of skin above left ankle with some mildly darker skin. Mild ttp over medial side of leg above ankle. ROM intact at foot. Normal cap refill, no warmth or severe swelling at ankle to indicate infection.         Assessment and Plan:   The following treatment plan was discussed    1. Hoarse voice quality  Persistent hoarseness and loss of voice after intubation in August.   Will refer to ENT for scope to evaluate for possible vocal cord dysfunction.   - REFERRAL TO ENT    2. Abnormality of urination  Continue to monitor, if no improvement, will refer to urology to evaluate for urethral stricture.   - POCT Urinalysis    3. Chronic pain of left ankle  Continue with PT, advised on ICE, NSAIDS, ACE wrap  Xray of left tib/fib and left ankle show soft tissue swelling on lateral side of ankle and leg.  Will monitor closely and may need MRI if worsening.   - DX-TIBIA AND FIBULA LEFT; Future    4. Chronic midline low back pain, with sciatica presence unspecified  Chronic condition, uncontrolled. Will try lidoderm patch.   - lidocaine (LIDODERM) 5 % Patch; Apply 1 Patch to skin as directed every 24 hours.  Dispense: 10 Patch; Refill: 6    5. Seborrheic keratosis  Reassurance, continue to monitor for changes. Excision if continued irritation with clothing.     6. Encounter for screening mammogram for malignant neoplasm of breas  - MA-SCREEN MAMMO W/CAD-BILAT      Followup: Return in about 2 months (around 11/28/2017) for removal of skin lesion, DM, COPD, ankle pain, hypoxia, .

## 2017-10-04 ENCOUNTER — TELEPHONE (OUTPATIENT)
Dept: MEDICAL GROUP | Facility: PHYSICIAN GROUP | Age: 67
End: 2017-10-04

## 2017-10-04 NOTE — TELEPHONE ENCOUNTER
1. Caller Name: Cristela Cooper                      Call Back Number: [phone]    2. Message: Cristela would like to extend pts home health.  She would like to keep seeing him 1x a week.  Please advise     3. Patient approves office to leave a detailed voicemail/MyChart message: N\A

## 2017-10-05 NOTE — TELEPHONE ENCOUNTER
Was the patient seen in the last year in this department? Yes     Does patient have an active prescription for medications requested? No     Received Request Via: Pharmacy      Pt met protocol?: Yes PT LAST OV 9/17

## 2017-10-06 RX ORDER — DULOXETIN HYDROCHLORIDE 60 MG/1
60 CAPSULE, DELAYED RELEASE ORAL DAILY
Qty: 90 CAP | Refills: 1 | Status: SHIPPED | OUTPATIENT
Start: 2017-10-06 | End: 2018-04-10 | Stop reason: SDUPTHER

## 2017-10-16 ENCOUNTER — TELEPHONE (OUTPATIENT)
Dept: MEDICAL GROUP | Facility: PHYSICIAN GROUP | Age: 67
End: 2017-10-16

## 2017-10-16 ENCOUNTER — PATIENT OUTREACH (OUTPATIENT)
Dept: HEALTH INFORMATION MANAGEMENT | Facility: OTHER | Age: 67
End: 2017-10-16

## 2017-10-16 NOTE — TELEPHONE ENCOUNTER
Patient called requesting a letter for the [employer] stating that she needs her mail delivered to her house due to her disability.

## 2017-10-16 NOTE — PROGRESS NOTES
One month follow-up: LSW outreach to pt to follow-up with her ability to get an LSW through [facility]. Pt reports that an LSW named Ana was able to meet with her. Pt reports that she assisted her with: 1) Energy Assistance application 2) Food [place] application 3) Homemaker referral through [employer]     LSW inquired if LSW was able to assist her with completing Medicaid application. Pt reports that she did not assist her with this. Encouraged pt to ask her PT/OT/RN to request for LSW to come back to meet with pt to complete this. She verbalized understanding. Pt reports that she has received information from Energy Assistance program indicating they are in need of further documentation. LSW discussed that if she is unaware of what needs to be submitted, [facility]. Pt reports that she will request above information.     Discussed that once she discharged from [facility], this writer will continue to follow pt to assist with any further goals she may have. Pt verbalized understanding.     Plan:  LSW to continue to follow pt for  . Will contact pt in approx one month during regular outreach call

## 2017-10-16 NOTE — LETTER
2017      To the post office:    Priya Callahan  1950 is my patient in clinic. Due to her restrictions from medical conditions, her mail needs to be delivered to her home.     Please call my office with any questions.     Thank you,       Kavitha Mccall MD

## 2017-10-25 ENCOUNTER — TELEPHONE (OUTPATIENT)
Dept: MEDICAL GROUP | Facility: PHYSICIAN GROUP | Age: 67
End: 2017-10-25

## 2017-10-25 NOTE — TELEPHONE ENCOUNTER
1. Caller Name: Priya                      Call Back Number: [phone]    2. Message: pt called and LVM stating that she needs an MRI results sent to a Dr.Denis Arteaga.  I do not see an MRI in her chart.  I called the Pt back to see where she got the MRI done at.  LVM to call us back     3. Patient approves office to leave a detailed voicemail/MyChart message: N\A

## 2017-11-11 ENCOUNTER — HOSPITAL ENCOUNTER (OUTPATIENT)
Facility: MEDICAL CENTER | Age: 67
End: 2017-11-12
Attending: EMERGENCY MEDICINE | Admitting: HOSPITALIST
Payer: MEDICARE

## 2017-11-11 ENCOUNTER — APPOINTMENT (OUTPATIENT)
Dept: RADIOLOGY | Facility: MEDICAL CENTER | Age: 67
End: 2017-11-11
Attending: EMERGENCY MEDICINE
Payer: MEDICARE

## 2017-11-11 ENCOUNTER — APPOINTMENT (OUTPATIENT)
Dept: RADIOLOGY | Facility: MEDICAL CENTER | Age: 67
End: 2017-11-11
Payer: MEDICARE

## 2017-11-11 DIAGNOSIS — G89.4 CHRONIC PAIN SYNDROME: ICD-10-CM

## 2017-11-11 DIAGNOSIS — R73.9 HYPERGLYCEMIA: ICD-10-CM

## 2017-11-11 DIAGNOSIS — R07.9 CHEST PAIN, UNSPECIFIED TYPE: ICD-10-CM

## 2017-11-11 LAB
ALBUMIN SERPL BCP-MCNC: 3.5 G/DL (ref 3.2–4.9)
ALBUMIN/GLOB SERPL: 1.1 G/DL
ALP SERPL-CCNC: 61 U/L (ref 30–99)
ALT SERPL-CCNC: 21 U/L (ref 2–50)
ANION GAP SERPL CALC-SCNC: 9 MMOL/L (ref 0–11.9)
APTT PPP: 31.3 SEC (ref 24.7–36)
AST SERPL-CCNC: 35 U/L (ref 12–45)
BASOPHILS # BLD AUTO: 0.4 % (ref 0–1.8)
BASOPHILS # BLD: 0.05 K/UL (ref 0–0.12)
BILIRUB SERPL-MCNC: 0.5 MG/DL (ref 0.1–1.5)
BNP SERPL-MCNC: 85 PG/ML (ref 0–100)
BUN SERPL-MCNC: 23 MG/DL (ref 8–22)
CALCIUM SERPL-MCNC: 9 MG/DL (ref 8.5–10.5)
CHLORIDE SERPL-SCNC: 102 MMOL/L (ref 96–112)
CO2 SERPL-SCNC: 24 MMOL/L (ref 20–33)
CREAT SERPL-MCNC: 1.19 MG/DL (ref 0.5–1.4)
EOSINOPHIL # BLD AUTO: 0.13 K/UL (ref 0–0.51)
EOSINOPHIL NFR BLD: 1 % (ref 0–6.9)
ERYTHROCYTE [DISTWIDTH] IN BLOOD BY AUTOMATED COUNT: 42.8 FL (ref 35.9–50)
GFR SERPL CREATININE-BSD FRML MDRD: 45 ML/MIN/1.73 M 2
GLOBULIN SER CALC-MCNC: 3.3 G/DL (ref 1.9–3.5)
GLUCOSE SERPL-MCNC: 233 MG/DL (ref 65–99)
HCT VFR BLD AUTO: 34 % (ref 37–47)
HGB BLD-MCNC: 11.4 G/DL (ref 12–16)
IMM GRANULOCYTES # BLD AUTO: 0.12 K/UL (ref 0–0.11)
IMM GRANULOCYTES NFR BLD AUTO: 0.9 % (ref 0–0.9)
INR PPP: 0.98 (ref 0.87–1.13)
LIPASE SERPL-CCNC: 27 U/L (ref 11–82)
LYMPHOCYTES # BLD AUTO: 0.82 K/UL (ref 1–4.8)
LYMPHOCYTES NFR BLD: 6.3 % (ref 22–41)
MCH RBC QN AUTO: 30 PG (ref 27–33)
MCHC RBC AUTO-ENTMCNC: 33.5 G/DL (ref 33.6–35)
MCV RBC AUTO: 89.5 FL (ref 81.4–97.8)
MONOCYTES # BLD AUTO: 0.52 K/UL (ref 0–0.85)
MONOCYTES NFR BLD AUTO: 4 % (ref 0–13.4)
NEUTROPHILS # BLD AUTO: 11.46 K/UL (ref 2–7.15)
NEUTROPHILS NFR BLD: 87.4 % (ref 44–72)
NRBC # BLD AUTO: 0 K/UL
NRBC BLD AUTO-RTO: 0 /100 WBC
PLATELET # BLD AUTO: 253 K/UL (ref 164–446)
PMV BLD AUTO: 8.5 FL (ref 9–12.9)
POTASSIUM SERPL-SCNC: 4.3 MMOL/L (ref 3.6–5.5)
PROT SERPL-MCNC: 6.8 G/DL (ref 6–8.2)
PROTHROMBIN TIME: 12.7 SEC (ref 12–14.6)
RBC # BLD AUTO: 3.8 M/UL (ref 4.2–5.4)
SODIUM SERPL-SCNC: 135 MMOL/L (ref 135–145)
TROPONIN I SERPL-MCNC: <0.01 NG/ML (ref 0–0.04)
WBC # BLD AUTO: 13.1 K/UL (ref 4.8–10.8)

## 2017-11-11 PROCEDURE — 85610 PROTHROMBIN TIME: CPT

## 2017-11-11 PROCEDURE — 83690 ASSAY OF LIPASE: CPT

## 2017-11-11 PROCEDURE — 84484 ASSAY OF TROPONIN QUANT: CPT

## 2017-11-11 PROCEDURE — 99285 EMERGENCY DEPT VISIT HI MDM: CPT

## 2017-11-11 PROCEDURE — 85025 COMPLETE CBC W/AUTO DIFF WBC: CPT

## 2017-11-11 PROCEDURE — 36415 COLL VENOUS BLD VENIPUNCTURE: CPT

## 2017-11-11 PROCEDURE — A9270 NON-COVERED ITEM OR SERVICE: HCPCS | Performed by: EMERGENCY MEDICINE

## 2017-11-11 PROCEDURE — 93005 ELECTROCARDIOGRAM TRACING: CPT | Performed by: EMERGENCY MEDICINE

## 2017-11-11 PROCEDURE — 83880 ASSAY OF NATRIURETIC PEPTIDE: CPT

## 2017-11-11 PROCEDURE — 71010 DX-CHEST-LIMITED (1 VIEW): CPT

## 2017-11-11 PROCEDURE — 700102 HCHG RX REV CODE 250 W/ 637 OVERRIDE(OP): Performed by: EMERGENCY MEDICINE

## 2017-11-11 PROCEDURE — 80053 COMPREHEN METABOLIC PANEL: CPT

## 2017-11-11 PROCEDURE — 94760 N-INVAS EAR/PLS OXIMETRY 1: CPT

## 2017-11-11 PROCEDURE — 85730 THROMBOPLASTIN TIME PARTIAL: CPT

## 2017-11-11 RX ADMIN — LIDOCAINE HYDROCHLORIDE 30 ML: 20 SOLUTION OROPHARYNGEAL at 23:25

## 2017-11-11 ASSESSMENT — LIFESTYLE VARIABLES: DO YOU DRINK ALCOHOL: NO

## 2017-11-12 ENCOUNTER — RESOLUTE PROFESSIONAL BILLING HOSPITAL PROF FEE (OUTPATIENT)
Dept: HOSPITALIST | Facility: MEDICAL CENTER | Age: 67
End: 2017-11-12
Payer: MEDICARE

## 2017-11-12 ENCOUNTER — APPOINTMENT (OUTPATIENT)
Dept: RADIOLOGY | Facility: MEDICAL CENTER | Age: 67
End: 2017-11-12
Attending: EMERGENCY MEDICINE
Payer: MEDICARE

## 2017-11-12 ENCOUNTER — APPOINTMENT (OUTPATIENT)
Dept: RADIOLOGY | Facility: MEDICAL CENTER | Age: 67
End: 2017-11-12
Attending: HOSPITALIST
Payer: MEDICARE

## 2017-11-12 VITALS
BODY MASS INDEX: 34.31 KG/M2 | HEIGHT: 65 IN | RESPIRATION RATE: 20 BRPM | SYSTOLIC BLOOD PRESSURE: 192 MMHG | HEART RATE: 77 BPM | WEIGHT: 205.91 LBS | OXYGEN SATURATION: 97 % | DIASTOLIC BLOOD PRESSURE: 76 MMHG | TEMPERATURE: 98.6 F

## 2017-11-12 PROBLEM — R07.9 CHEST PAIN: Status: RESOLVED | Noted: 2017-11-12 | Resolved: 2017-11-12

## 2017-11-12 PROBLEM — R07.9 CHEST PAIN: Status: ACTIVE | Noted: 2017-11-12

## 2017-11-12 LAB
BASOPHILS # BLD AUTO: 0.2 % (ref 0–1.8)
BASOPHILS # BLD: 0.03 K/UL (ref 0–0.12)
EKG IMPRESSION: NORMAL
EOSINOPHIL # BLD AUTO: 0.04 K/UL (ref 0–0.51)
EOSINOPHIL NFR BLD: 0.3 % (ref 0–6.9)
ERYTHROCYTE [DISTWIDTH] IN BLOOD BY AUTOMATED COUNT: 42.6 FL (ref 35.9–50)
GLUCOSE BLD-MCNC: 175 MG/DL (ref 65–99)
GLUCOSE BLD-MCNC: 216 MG/DL (ref 65–99)
HCT VFR BLD AUTO: 35 % (ref 37–47)
HGB BLD-MCNC: 11.9 G/DL (ref 12–16)
IMM GRANULOCYTES # BLD AUTO: 0.06 K/UL (ref 0–0.11)
IMM GRANULOCYTES NFR BLD AUTO: 0.5 % (ref 0–0.9)
LYMPHOCYTES # BLD AUTO: 1.15 K/UL (ref 1–4.8)
LYMPHOCYTES NFR BLD: 9 % (ref 22–41)
MCH RBC QN AUTO: 30.4 PG (ref 27–33)
MCHC RBC AUTO-ENTMCNC: 34 G/DL (ref 33.6–35)
MCV RBC AUTO: 89.3 FL (ref 81.4–97.8)
MONOCYTES # BLD AUTO: 0.65 K/UL (ref 0–0.85)
MONOCYTES NFR BLD AUTO: 5.1 % (ref 0–13.4)
NEUTROPHILS # BLD AUTO: 10.89 K/UL (ref 2–7.15)
NEUTROPHILS NFR BLD: 84.9 % (ref 44–72)
NRBC # BLD AUTO: 0 K/UL
NRBC BLD AUTO-RTO: 0 /100 WBC
PLATELET # BLD AUTO: 249 K/UL (ref 164–446)
PMV BLD AUTO: 8.4 FL (ref 9–12.9)
RBC # BLD AUTO: 3.92 M/UL (ref 4.2–5.4)
TROPONIN I SERPL-MCNC: <0.01 NG/ML (ref 0–0.04)
WBC # BLD AUTO: 12.8 K/UL (ref 4.8–10.8)

## 2017-11-12 PROCEDURE — A9502 TC99M TETROFOSMIN: HCPCS

## 2017-11-12 PROCEDURE — 700111 HCHG RX REV CODE 636 W/ 250 OVERRIDE (IP): Performed by: EMERGENCY MEDICINE

## 2017-11-12 PROCEDURE — 700105 HCHG RX REV CODE 258: Performed by: HOSPITALIST

## 2017-11-12 PROCEDURE — 700117 HCHG RX CONTRAST REV CODE 255: Performed by: EMERGENCY MEDICINE

## 2017-11-12 PROCEDURE — 93005 ELECTROCARDIOGRAM TRACING: CPT | Performed by: HOSPITALIST

## 2017-11-12 PROCEDURE — 99236 HOSP IP/OBS SAME DATE HI 85: CPT | Performed by: HOSPITALIST

## 2017-11-12 PROCEDURE — 96372 THER/PROPH/DIAG INJ SC/IM: CPT

## 2017-11-12 PROCEDURE — 96375 TX/PRO/DX INJ NEW DRUG ADDON: CPT

## 2017-11-12 PROCEDURE — A9270 NON-COVERED ITEM OR SERVICE: HCPCS | Performed by: HOSPITALIST

## 2017-11-12 PROCEDURE — A9270 NON-COVERED ITEM OR SERVICE: HCPCS | Performed by: FAMILY MEDICINE

## 2017-11-12 PROCEDURE — 96376 TX/PRO/DX INJ SAME DRUG ADON: CPT

## 2017-11-12 PROCEDURE — 700102 HCHG RX REV CODE 250 W/ 637 OVERRIDE(OP): Performed by: HOSPITALIST

## 2017-11-12 PROCEDURE — 84484 ASSAY OF TROPONIN QUANT: CPT

## 2017-11-12 PROCEDURE — 74177 CT ABD & PELVIS W/CONTRAST: CPT

## 2017-11-12 PROCEDURE — 96374 THER/PROPH/DIAG INJ IV PUSH: CPT

## 2017-11-12 PROCEDURE — 82962 GLUCOSE BLOOD TEST: CPT

## 2017-11-12 PROCEDURE — 700102 HCHG RX REV CODE 250 W/ 637 OVERRIDE(OP): Performed by: FAMILY MEDICINE

## 2017-11-12 PROCEDURE — 85025 COMPLETE CBC W/AUTO DIFF WBC: CPT

## 2017-11-12 PROCEDURE — 700111 HCHG RX REV CODE 636 W/ 250 OVERRIDE (IP)

## 2017-11-12 PROCEDURE — G0378 HOSPITAL OBSERVATION PER HR: HCPCS

## 2017-11-12 PROCEDURE — 36415 COLL VENOUS BLD VENIPUNCTURE: CPT

## 2017-11-12 PROCEDURE — 700111 HCHG RX REV CODE 636 W/ 250 OVERRIDE (IP): Performed by: FAMILY MEDICINE

## 2017-11-12 PROCEDURE — 93010 ELECTROCARDIOGRAM REPORT: CPT | Performed by: INTERNAL MEDICINE

## 2017-11-12 PROCEDURE — 93005 ELECTROCARDIOGRAM TRACING: CPT | Mod: XU | Performed by: EMERGENCY MEDICINE

## 2017-11-12 RX ORDER — LEVOFLOXACIN 500 MG/1
500 TABLET, FILM COATED ORAL DAILY
Qty: 5 TAB | Refills: 0 | Status: SHIPPED | OUTPATIENT
Start: 2017-11-12 | End: 2018-02-14

## 2017-11-12 RX ORDER — POTASSIUM CHLORIDE 750 MG/1
10 TABLET, EXTENDED RELEASE ORAL DAILY
Qty: 30 TAB | Refills: 3 | Status: SHIPPED | OUTPATIENT
Start: 2017-11-12 | End: 2018-07-05

## 2017-11-12 RX ORDER — HYDROCODONE BITARTRATE AND ACETAMINOPHEN 5; 325 MG/1; MG/1
1-2 TABLET ORAL EVERY 6 HOURS PRN
Status: DISCONTINUED | OUTPATIENT
Start: 2017-11-12 | End: 2017-11-12 | Stop reason: HOSPADM

## 2017-11-12 RX ORDER — BUDESONIDE AND FORMOTEROL FUMARATE DIHYDRATE 160; 4.5 UG/1; UG/1
2 AEROSOL RESPIRATORY (INHALATION) 2 TIMES DAILY
Status: DISCONTINUED | OUTPATIENT
Start: 2017-11-12 | End: 2017-11-12 | Stop reason: HOSPADM

## 2017-11-12 RX ORDER — ONDANSETRON 2 MG/ML
4 INJECTION INTRAMUSCULAR; INTRAVENOUS ONCE
Status: COMPLETED | OUTPATIENT
Start: 2017-11-12 | End: 2017-11-12

## 2017-11-12 RX ORDER — AMOXICILLIN 250 MG
2 CAPSULE ORAL 2 TIMES DAILY
Status: DISCONTINUED | OUTPATIENT
Start: 2017-11-12 | End: 2017-11-12 | Stop reason: HOSPADM

## 2017-11-12 RX ORDER — INSULIN GLARGINE 100 [IU]/ML
10 INJECTION, SOLUTION SUBCUTANEOUS EVERY EVENING
Status: DISCONTINUED | OUTPATIENT
Start: 2017-11-12 | End: 2017-11-12 | Stop reason: HOSPADM

## 2017-11-12 RX ORDER — NITROGLYCERIN 0.4 MG/1
0.4 TABLET SUBLINGUAL
Status: DISCONTINUED | OUTPATIENT
Start: 2017-11-12 | End: 2017-11-12 | Stop reason: HOSPADM

## 2017-11-12 RX ORDER — MORPHINE SULFATE 4 MG/ML
2-4 INJECTION, SOLUTION INTRAMUSCULAR; INTRAVENOUS
Status: DISCONTINUED | OUTPATIENT
Start: 2017-11-12 | End: 2017-11-12 | Stop reason: HOSPADM

## 2017-11-12 RX ORDER — SODIUM CHLORIDE 9 MG/ML
1000 INJECTION, SOLUTION INTRAVENOUS CONTINUOUS
Status: DISCONTINUED | OUTPATIENT
Start: 2017-11-12 | End: 2017-11-12 | Stop reason: HOSPADM

## 2017-11-12 RX ORDER — LEVOTHYROXINE SODIUM 0.07 MG/1
75 TABLET ORAL
Status: DISCONTINUED | OUTPATIENT
Start: 2017-11-12 | End: 2017-11-12 | Stop reason: HOSPADM

## 2017-11-12 RX ORDER — TRAZODONE HYDROCHLORIDE 150 MG/1
300 TABLET ORAL
COMMUNITY
End: 2018-01-23 | Stop reason: SDUPTHER

## 2017-11-12 RX ORDER — BISACODYL 10 MG
10 SUPPOSITORY, RECTAL RECTAL
Status: DISCONTINUED | OUTPATIENT
Start: 2017-11-12 | End: 2017-11-12 | Stop reason: HOSPADM

## 2017-11-12 RX ORDER — OXYCODONE HYDROCHLORIDE 10 MG/1
20 TABLET ORAL 2 TIMES DAILY
COMMUNITY
End: 2017-11-15 | Stop reason: SDUPTHER

## 2017-11-12 RX ORDER — AMLODIPINE BESYLATE 10 MG/1
10 TABLET ORAL DAILY
Status: DISCONTINUED | OUTPATIENT
Start: 2017-11-12 | End: 2017-11-12 | Stop reason: HOSPADM

## 2017-11-12 RX ORDER — POLYETHYLENE GLYCOL 3350 17 G/17G
1 POWDER, FOR SOLUTION ORAL
Status: DISCONTINUED | OUTPATIENT
Start: 2017-11-12 | End: 2017-11-12 | Stop reason: HOSPADM

## 2017-11-12 RX ORDER — TIOTROPIUM BROMIDE 18 UG/1
1 CAPSULE ORAL; RESPIRATORY (INHALATION) DAILY
Status: DISCONTINUED | OUTPATIENT
Start: 2017-11-12 | End: 2017-11-12 | Stop reason: HOSPADM

## 2017-11-12 RX ORDER — DEXTROSE MONOHYDRATE 25 G/50ML
25 INJECTION, SOLUTION INTRAVENOUS
Status: DISCONTINUED | OUTPATIENT
Start: 2017-11-12 | End: 2017-11-12 | Stop reason: HOSPADM

## 2017-11-12 RX ORDER — ATORVASTATIN CALCIUM 40 MG/1
40 TABLET, FILM COATED ORAL EVERY EVENING
Status: DISCONTINUED | OUTPATIENT
Start: 2017-11-12 | End: 2017-11-12 | Stop reason: HOSPADM

## 2017-11-12 RX ORDER — REGADENOSON 0.08 MG/ML
INJECTION, SOLUTION INTRAVENOUS
Status: COMPLETED
Start: 2017-11-12 | End: 2017-11-12

## 2017-11-12 RX ORDER — HYDROCHLOROTHIAZIDE 12.5 MG/1
12.5 TABLET ORAL DAILY
Qty: 30 TAB | Refills: 3 | Status: SHIPPED | OUTPATIENT
Start: 2017-11-12 | End: 2018-07-05

## 2017-11-12 RX ORDER — FAMOTIDINE 20 MG/1
20 TABLET, FILM COATED ORAL 2 TIMES DAILY
Qty: 60 TAB | Refills: 3 | Status: SHIPPED | OUTPATIENT
Start: 2017-11-12 | End: 2018-02-14

## 2017-11-12 RX ORDER — CYCLOBENZAPRINE HCL 10 MG
10 TABLET ORAL 2 TIMES DAILY
COMMUNITY
End: 2017-12-19

## 2017-11-12 RX ORDER — LISINOPRIL 20 MG/1
40 TABLET ORAL DAILY
Status: DISCONTINUED | OUTPATIENT
Start: 2017-11-12 | End: 2017-11-12 | Stop reason: HOSPADM

## 2017-11-12 RX ORDER — OXYCODONE HYDROCHLORIDE 5 MG/1
5 TABLET ORAL ONCE
Status: COMPLETED | OUTPATIENT
Start: 2017-11-12 | End: 2017-11-12

## 2017-11-12 RX ORDER — ONDANSETRON 2 MG/ML
4 INJECTION INTRAMUSCULAR; INTRAVENOUS EVERY 6 HOURS PRN
Status: DISCONTINUED | OUTPATIENT
Start: 2017-11-12 | End: 2017-11-12 | Stop reason: HOSPADM

## 2017-11-12 RX ORDER — HYDRALAZINE HYDROCHLORIDE 20 MG/ML
10 INJECTION INTRAMUSCULAR; INTRAVENOUS ONCE
Status: COMPLETED | OUTPATIENT
Start: 2017-11-12 | End: 2017-11-12

## 2017-11-12 RX ADMIN — ONDANSETRON 4 MG: 2 INJECTION INTRAMUSCULAR; INTRAVENOUS at 02:19

## 2017-11-12 RX ADMIN — HYDROCODONE BITARTRATE AND ACETAMINOPHEN 1 TABLET: 5; 325 TABLET ORAL at 13:52

## 2017-11-12 RX ADMIN — ONDANSETRON 4 MG: 2 INJECTION INTRAMUSCULAR; INTRAVENOUS at 06:54

## 2017-11-12 RX ADMIN — HYDRALAZINE HYDROCHLORIDE 10 MG: 20 INJECTION INTRAMUSCULAR; INTRAVENOUS at 06:21

## 2017-11-12 RX ADMIN — BUDESONIDE AND FORMOTEROL FUMARATE DIHYDRATE 2 PUFF: 160; 4.5 AEROSOL RESPIRATORY (INHALATION) at 12:43

## 2017-11-12 RX ADMIN — REGADENOSON 0.4 MG: 0.08 INJECTION, SOLUTION INTRAVENOUS at 11:38

## 2017-11-12 RX ADMIN — AMLODIPINE BESYLATE 10 MG: 10 TABLET ORAL at 12:43

## 2017-11-12 RX ADMIN — OXYCODONE HYDROCHLORIDE 5 MG: 5 TABLET ORAL at 06:22

## 2017-11-12 RX ADMIN — IOHEXOL 100 ML: 350 INJECTION, SOLUTION INTRAVENOUS at 01:24

## 2017-11-12 RX ADMIN — TIOTROPIUM BROMIDE 1 CAPSULE: 18 CAPSULE ORAL; RESPIRATORY (INHALATION) at 12:43

## 2017-11-12 RX ADMIN — LISINOPRIL 40 MG: 20 TABLET ORAL at 06:22

## 2017-11-12 RX ADMIN — SODIUM CHLORIDE 1000 ML: 9 INJECTION, SOLUTION INTRAVENOUS at 05:46

## 2017-11-12 RX ADMIN — INSULIN HUMAN 2 UNITS: 100 INJECTION, SOLUTION PARENTERAL at 12:45

## 2017-11-12 ASSESSMENT — ENCOUNTER SYMPTOMS
DIARRHEA: 0
FOCAL WEAKNESS: 0
COUGH: 0
SHORTNESS OF BREATH: 0
ABDOMINAL PAIN: 0
TINGLING: 0
FEVER: 0
NAUSEA: 0
MYALGIAS: 1
WHEEZING: 0
PHOTOPHOBIA: 0
PALPITATIONS: 0
HEADACHES: 0
SORE THROAT: 0
VOMITING: 0
CHILLS: 0
DEPRESSION: 0
DIZZINESS: 0

## 2017-11-12 ASSESSMENT — PAIN SCALES - GENERAL
PAINLEVEL_OUTOF10: 8
PAINLEVEL_OUTOF10: 6

## 2017-11-12 ASSESSMENT — PATIENT HEALTH QUESTIONNAIRE - PHQ9
SUM OF ALL RESPONSES TO PHQ9 QUESTIONS 1 AND 2: 0
2. FEELING DOWN, DEPRESSED, IRRITABLE, OR HOPELESS: NOT AT ALL
1. LITTLE INTEREST OR PLEASURE IN DOING THINGS: NOT AT ALL
SUM OF ALL RESPONSES TO PHQ QUESTIONS 1-9: 0

## 2017-11-12 ASSESSMENT — LIFESTYLE VARIABLES: EVER_SMOKED: YES

## 2017-11-12 NOTE — PROGRESS NOTES
On call paged regarding pt request for pain meds, nausea and elevated blood pressure. Spoke to Dr Macias, new orders received. Pt updated on poc

## 2017-11-12 NOTE — ASSESSMENT & PLAN NOTE
Patient has multiple risk factors for cardiovascular disease including hypertension, diabetes mellitus, COPD with a long-standing tobacco history and obesity. She'll be monitored closely on the telemetry floor for evidence of any cardiac dysrhythmias. I will trend troponin levels as well as obtain serial EKGs. I have started her on an aspirin and a statin and she will receive as needed morphine, oxygen, and nitroglycerin. I will check a TSH and a lipid panel. If her cardiac enzymes remained negative in the morning, then all further risk stratify her with a nuclear medicine stress test.

## 2017-11-12 NOTE — ASSESSMENT & PLAN NOTE
I'm holding the patient's oral hypoglycemics until she completes her nuclear medicine stress test. In the meantime, I will continue her home Lantus, monitor with Accu-Cheks and cover with insulin sliding scale.

## 2017-11-12 NOTE — PROGRESS NOTES
A/o, assessment completed per CDU, updated communication board,  Chest, epigastric, shoulder pain complaints 8/10, pt verbalized that numbness and tingling present on fingers and lower extremities, not new. Nausea and dry heaving noted, poc discussed, verbalized understanding, all questions answered at this time , fall precautions in place. call button within reach, will continue to monitor

## 2017-11-12 NOTE — ED NOTES
Chief Complaint   Patient presents with   • Chest Pain     Pt bib ems from home. Pt complained of chest pain that has been present since about 1400hrs today. Pt states that after she vomited the [employer] hot pockets that she had for lunch up she felt much better.      f

## 2017-11-12 NOTE — PROGRESS NOTES
Report received, assumed patient care.  Pt A&OX4.  Assessment completed.  Call light within reach, personal belongings available, bed in lowest position, pt calling for assistance.  Pt is NPO pending stress test this AM.  Pt reports pain declines pain meds at this time.  Communication board updated, POC discussed.  Monitors reapplied, VSS.  No additional needs at this time.

## 2017-11-12 NOTE — H&P
Hospital Medicine History and Physical    Date of Service  11/12/2017    Chief Complaint  Chief Complaint   Patient presents with   • Chest Pain     Pt bib ems from home. Pt complained of chest pain that has been present since about 1400hrs today. Pt states that after she vomited the [employer] hot pockets that she had for lunch up she felt much better.        History of Presenting Illness  67 y.o. female who presented on 11/11/2017 with Chest pain. The patient states that her symptoms began at approximately 3:00 this afternoon while she was at rest. She has a history of acid reflux but she states that this pain felt much worse than her usual symptoms. She describes it as a heavy pressure that was nonradiating. She drank some soda and token a Rolaids without any alleviation of her symptoms. It was aggravated with movement. She denies any associated diaphoresis shortness of breath or lightheadedness but does report some nausea and vomiting. She called the  hotline and was instructed to call 911. Prior to this, the patient was in her usual state of health, she does have some nasal congestion but otherwise denies any recent URIs, fevers, chills, diarrhea, constipation, or dysuria. Her substernal chest pain was reproducible.      Primary Care Physician  Kavitha Mccall M.D.    Consultants  None    Code Status  Full    Review of Systems  Review of Systems   Constitutional: Negative for chills and fever.   HENT: Negative for congestion and sore throat.    Eyes: Negative for photophobia.   Respiratory: Negative for cough, shortness of breath and wheezing.    Cardiovascular: Negative for chest pain and palpitations.   Gastrointestinal: Negative for abdominal pain, diarrhea, nausea and vomiting.   Genitourinary: Negative for dysuria.   Musculoskeletal: Positive for myalgias (chronic pain).   Skin: Negative.    Neurological: Negative for dizziness, tingling, focal weakness and headaches.   Psychiatric/Behavioral: Negative  "for depression and suicidal ideas.        Past Medical History  Past Medical History:   Diagnosis Date   • Pain 9/13/2016    \"pain everywhere\"   • Psychiatric problem 9/13/2016    PTSD   • Congestive heart failure (CMS-HCC) 2013    related to pulmonary failure   • Arthritis     \"everywhere\"   • ASTHMA    • Backpain     chronic back pain   • Breath shortness     \"only with flare up with allergies\"   • Bronchitis     as a child   • COPD    • Diabetes     Type 2   • Disorder of thyroid     Hypothyroid   • Fall    • Fibromyalgia    • GERD (gastroesophageal reflux disease)    • Heart burn    • Hepatitis C     \"I have markers in blood but not active\"   • Hypertension    • Indigestion    • Infectious disease     Hepatitis C   • Neuropathy (CMS-HCC)     bilat feet   • On home oxygen therapy    • Other specified symptom associated with female genital organs     Hysterectomy at age 22yrs   • PND (post-nasal drip)    • Pneumonia     as a child   • RLS (restless legs syndrome)    • Sleep apnea     does not wear CPAP, \"can't do it\"   • Unspecified urinary incontinence        Surgical History  Past Surgical History:   Procedure Laterality Date   • ANKLE ORIF Left 5/8/2017    Procedure: ANKLE ORIF;  Surgeon: Rico Gtz M.D.;  Location: [facility];  Service:    • MA DSTR NROLYTC AGNT PARVERTEB FCT SNGL LMBR/SACRAL Left 9/16/2016    Procedure: NEURO DEST FACET L/S W/IG SNGL - L3-S1;  Surgeon: Xavier Arteaga D.O.;  Location: [facility] ORS;  Service: Pain Management   • MA DSTR NROLYTC AGNT PARVERTEB FCT ADDL LMBR/SACRAL  9/16/2016    Procedure: NEURO DEST FACET L/S W/IG ADDL;  Surgeon: Xavier Arteaga D.O.;  Location: SURGERY [facility] ORS;  Service: Pain Management   • MA DSTR NROLYTC AGNT PARVERTEB FCT ADDL LMBR/SACRAL  9/16/2016    Procedure: NEURO DEST FACET L/S W/IG ADDL;  Surgeon: Xavier Arteaga D.O.;  Location: SURGERY [facility] ORS;  Service: Pain Management   • PB " FLUOROSCOPIC GUIDANCE NEEDLE PLACEMENT  9/16/2016    Procedure: FLOURO GUIDE NEEDLE PLACEMENT;  Surgeon: Xavier Arteaga D.O.;  Location: SURGERY SURGICAL [facility] ORS;  Service: Pain Management   • ID DSTR NROLYTC AGNT PARVERTEB FCT SNGL LMBR/SACRAL Right 11/6/2015    Procedure: NEURO DEST FACET L/S W/IG SNGL - L3-S1;  Surgeon: Xavier Arteaga D.O.;  Location: SURGERY SURGICAL [facility] ORS;  Service: Pain Management   • ID DSTR NROLYTC AGNT PARVERTEB FCT ADDL LMBR/SACRAL  11/6/2015    Procedure: NEURO DEST FACET L/S W/IG ADDL;  Surgeon: Xavier Arteaga D.O.;  Location: SURGERY [facility] ORS;  Service: Pain Management   • PB INJECT RX OTHER PERIPH NERVE  11/6/2015    Procedure: NEUROLYTIC DEST-OTHER NERVE;  Surgeon: Xavier Arteaga D.O.;  Location: SURGERY [facility] ORS;  Service: Pain Management   • ID DSTR NROLYTC AGNT PARVERTEB FCT ADDL LMBR/SACRAL  11/6/2015    Procedure: NEURO DEST FACET L/S W/IG ADDL;  Surgeon: Xavier Arteaga D.O.;  Location: SURGERY [facility] ORS;  Service: Pain Management   • ID DSTR NROLYTC AGNT PARVERTEB FCT SNGL LMBR/SACRAL Left 10/2/2015    Procedure: NEURO DEST FACET L/S W/IG SNGL - L3-S1;  Surgeon: Xavier Arteaga D.O.;  Location: SURGERY [facility] ORS;  Service: Pain Management   • ID DSTR NROLYTC AGNT PARVERTEB FCT ADDL LMBR/SACRAL  10/2/2015    Procedure: NEURO DEST FACET L/S W/IG ADDL;  Surgeon: Xavier Arteaga D.O.;  Location: SURGERY [facility] ORS;  Service: Pain Management   • PB INJECT RX OTHER PERIPH NERVE  10/2/2015    Procedure: NEUROLYTIC DEST-OTHER NERVE;  Surgeon: Xavier Arteaga D.O.;  Location: SURGERY SURGICAL [facility] ORS;  Service: Pain Management   • ID DSTR NROLYTC AGNT PARVERTEB FCT ADDL LMBR/SACRAL  10/2/2015    Procedure: NEURO DEST FACET L/S W/IG ADDL;  Surgeon: Xavier Arteaga D.O.;  Location: SURGERY [facility];  Service: Pain Management   • PB INJ DX/THER AGNT PARAVERT FACET JOINT, OBED* Left 7/17/2015    Procedure:  INJ PARA FACET L/S 1 LVL W/IG - L3-S1;  Surgeon: Xavier Arteaga D.O.;  Location: SURGERY [facility] ORS;  Service: Pain Management   • PB INJ DX/THER AGNT PARAVERT FACET JOINT, OBED*  7/17/2015    Procedure: INJ PARA FACET L/S 2D LVL W/IG;  Surgeon: Xavier Arteaga D.O.;  Location: SURGERY SURGICAL [facility] ORS;  Service: Pain Management   • PB INJ DX/THER AGNT PARAVERT FACET JOINT, OBED*  7/17/2015    Procedure: INJ PARA FACET L/S 3D LVL W/IG;  Surgeon: Xavier Arteaga D.O.;  Location: SURGERY SURGICAL [facility] ORS;  Service: Pain Management   • PB INJECT NERV BLCK,OTHR PERIPH NERV  7/17/2015    Procedure: INJ-ANES AGENT-OTHER PHER.NRVE;  Surgeon: Xavier Arteaga D.O.;  Location: SURGERY [facility] ORS;  Service: Pain Management   • PB INJ DX/THER AGNT PARAVERT FACET JOINT, OBED* Left 7/10/2015    Procedure: INJ PARA FACET L/S 1 LVL W/IG - L3-S1;  Surgeon: Xavier Arteaga D.O.;  Location: SURGERY [facility] ORS;  Service: Pain Management   • PB INJ DX/THER AGNT PARAVERT FACET JOINT, OBED*  7/10/2015    Procedure: INJ PARA FACET L/S 2D LVL W/IG;  Surgeon: Xavier Arteaga D.O.;  Location: SURGERY [facility] ORS;  Service: Pain Management   • PB INJ DX/THER AGNT PARAVERT FACET JOINT, OBED*  7/10/2015    Procedure: INJ PARA FACET L/S 3D LVL W/IG;  Surgeon: Xavier Arteaga D.O.;  Location: SURGERY [facility] ORS;  Service: Pain Management   • PB INJECT NERV BLCK,OTHR PERIPH NERV  7/10/2015    Procedure: INJ-ANES AGENT-OTHER PHER.NRVE;  Surgeon: Xavier Arteaga D.O.;  Location: SURGERY SURGICAL [facility] ORS;  Service: Pain Management   • GYN SURGERY      hysterectomy    • LUMPECTOMY Left     Left breast   • OTHER ORTHOPEDIC SURGERY      L knee replacement    • OTHER ORTHOPEDIC SURGERY      R carpal tunnel    • US-NEEDLE CORE BX-BREAST PANEL         Medications  No current facility-administered medications on file prior to encounter.      Current Outpatient Prescriptions on File Prior to Encounter    Medication Sig Dispense Refill   • duloxetine (CYMBALTA) 60 MG Cap DR Particles delayed-release capsule Take 1 Cap by mouth every day. 90 Cap 1   • lidocaine (LIDODERM) 5 % Patch Apply 1 Patch to skin as directed every 24 hours. 10 Patch 6   • cyclobenzaprine (FLEXERIL) 10 MG Tab Take 1 Tab by mouth 2 times a day as needed. 30 Tab 3   • Insulin Pen Needle 33G X 8 MM Misc 3 Units by Does not apply route 3 times a day for 90 days. 100 Each 3   • LANTUS SOLOSTAR 100 UNIT/ML Solution Pen-injector injection INJECT 5-20 UNITS SUBCUTANEOUSLY EVERY EVENING 5 PEN 0   • lisinopril (PRINIVIL, ZESTRIL) 40 MG tablet Take 40 mg by mouth every day.     • Biotin 5000 MCG Cap Take  by mouth.     • Misc. Devices Misc Front Wheeled walker. 1 Units 0   • Misc. Devices Misc Simply Go Mini air compressor With associated supplies needed. 1 Units 0   • oxycodone immediate release (ROXICODONE) 10 MG immediate release tablet Take 1 Tab by mouth every 6 hours as needed for Moderate Pain for up to 30 days. 120 Tab 0   • trazodone (DESYREL) 100 MG Tab TAKE 3 TABLETS BY MOUTH NIGHTLY AT BEDTIME AS NEEDED FOR SLEEP 90 Tab 0   • Blood Glucose Monitoring Suppl (FREESTYLE LITE) Device Check blood sugars three times daily. 1 Device 0   • budesonide-formoterol (SYMBICORT) 160-4.5 MCG/ACT Aerosol Inhale 2 Puffs by mouth 2 Times a Day. 1 Inhaler 11   • nystatin/triamcinolone (MYCOLOG) 004430-6.1 UNIT/GM-% Cream Apply in a thin layer to fungal rash once per day prn 60 g 1   • amlodipine (NORVASC) 10 MG Tab Take 1 Tab by mouth every day. 90 Tab 1   • Lactobacillus-Inulin (CULTUREE DIGESTIVE HEALTH PO) Take 1 Tab by mouth every day.     • omeprazole (PRILOSEC) 40 MG delayed-release capsule Take 1 Cap by mouth every day. 30 Cap    • multivitamin (THERAGRAN) Tab Take 1 Tab by mouth every day.     • Cyanocobalamin (VITAMIN B-12) 1000 MCG Tab Take 1,000 mcg by mouth every evening.     • Cholecalciferol (VITAMIN D3) 2000 UNITS Tab Take 2,000 Units by mouth  "every day.     • senna-docusate (PERICOLACE OR SENOKOT S) 8.6-50 MG Tab Take 1 Tab by mouth every evening.     • tiotropium (SPIRIVA) 18 MCG Cap Inhale 1 Cap by mouth every day. 30 Cap 3   • levothyroxine (SYNTHROID) 75 MCG Tab TAKE 1 TABLET BY MOUTH DAILY 90 Tab 3       Family History  Family History   Problem Relation Age of Onset   • Lung Disease Mother    • Alcohol/Drug Mother    • Heart Disease Mother    • Cancer Father    • Cancer Brother    • Diabetes Maternal Grandmother    • Stroke Paternal Grandmother        Social History  Social History   Substance Use Topics   • Smoking status: Former Smoker     Packs/day: 1.00     Years: 50.00     Types: Cigarettes     Quit date: 2017   • Smokeless tobacco: Never Used   • Alcohol use No       Allergies  Allergies   Allergen Reactions   • Vitamin C Diarrhea     \"violent diarrhea\"   • Keflex Rash     Severe rash, but TOLERATES PENICILLINS, Rocephin    • Codeine Nausea     Severe stomach pain   • Lyrica Nausea     Nausea, dizzy   • Sudafed Nausea and Unspecified     Chest pain, nausea        Physical Exam  Laboratory   Hemodynamics  No data recorded.      Pulse  Av.4  Min: 63  Max: 73 Heart Rate (Monitored): 73  Blood Pressure : (!) 166/60, NIBP: (!) 177/64      Respiratory      Respiration: 17, Pulse Oximetry: 95 %             Physical Exam   Constitutional: She is oriented to person, place, and time. No distress.   Obese   HENT:   Head: Normocephalic and atraumatic.   Right Ear: External ear normal.   Left Ear: External ear normal.   Eyes: EOM are normal. Right eye exhibits no discharge. Left eye exhibits no discharge.   Neck: Neck supple. No JVD present.   Cardiovascular: Normal rate, regular rhythm and normal heart sounds.    Pulmonary/Chest: Effort normal and breath sounds normal. No respiratory distress. She exhibits no tenderness.   Diminished breath sounds secondary to habitus, no wheezes heard   Abdominal: Soft. Bowel sounds are normal. She exhibits no " distension. There is no tenderness.   Musculoskeletal: Normal range of motion. She exhibits no edema.   Neurological: She is alert and oriented to person, place, and time. No cranial nerve deficit.   Skin: Skin is warm and dry. She is not diaphoretic. No erythema.   Psychiatric: She has a normal mood and affect. Her behavior is normal.   Nursing note and vitals reviewed.      Recent Labs      11/11/17 2253   WBC  13.1*   RBC  3.80*   HEMOGLOBIN  11.4*   HEMATOCRIT  34.0*   MCV  89.5   MCH  30.0   MCHC  33.5*   RDW  42.8   PLATELETCT  253   MPV  8.5*     Recent Labs      11/11/17 2253   SODIUM  135   POTASSIUM  4.3   CHLORIDE  102   CO2  24   GLUCOSE  233*   BUN  23*   CREATININE  1.19   CALCIUM  9.0     Recent Labs      11/11/17 2253   ALTSGPT  21   ASTSGOT  35   ALKPHOSPHAT  61   TBILIRUBIN  0.5   LIPASE  27   GLUCOSE  233*     Recent Labs      11/11/17 2253   APTT  31.3   INR  0.98     Recent Labs      11/11/17 2253   BNPBTYPENAT  85         Lab Results   Component Value Date    TROPONINI <0.01 11/11/2017       Imaging  Ct-abdomen-pelvis With    Result Date: 11/12/2017 11/12/2017 12:40 AM HISTORY/REASON FOR EXAM:  RUQ Pain. TECHNIQUE/EXAM DESCRIPTION:   CT scan of the abdomen and pelvis with contrast. Contrast-enhanced helical scanning was obtained from the diaphragmatic domes through the pubic symphysis following the bolus administration of nonionic contrast without complication. 100 mL of Omnipaque 350 nonionic contrast was administered without complication. Low dose optimization technique was utilized for this CT exam including automated exposure control and adjustment of the mA and/or kV according to patient size. COMPARISON: 5/8/2015 FINDINGS: Patchy nodules in the right lung base are not as prominent as on the prior exam. CT Abdomen: The liver, spleen, adrenal glands, and pancreas are unremarkable. A hypodense mass in the lower pole of the left kidney is indeterminant measuring approximately 14  mm. It has increased slightly from the prior exam 2015. There are scattered arterial calcifications. There are scattered diverticula in the descending and sigmoid colon. There is a moderate to large amount of colonic stool. A small umbilical hernia contains fat. CT Pelvis: The bladder is decompressed. The uterus has been removed. No adenopathy is identified. There is mild spinal curvature with degenerative change     1.  No acute intra-abdominal findings. 2.  Diverticulosis. 3.  Indeterminate 1.4 cm hypodense lesion in the left kidney, likely a proteinaceous cyst. Nonemergent renal protocol MRI could be performed for further evaluation. 4.  Atherosclerosis. 5.  Patchy right basilar nodules have decreased from the prior exam in 2015. Findings likely represent a chronic infectious or inflammatory process.    Dx-chest-limited (1 View)    Result Date: 11/11/2017 11/11/2017 11:10 PM HISTORY/REASON FOR EXAM:  Chest Pain TECHNIQUE/EXAM DESCRIPTION AND NUMBER OF VIEWS: Single AP view of the chest. COMPARISON: 6/28/2017 FINDINGS: The cardiac silhouette and mediastinal contours are grossly stable. Interstitial densities in the lung bases are likely atelectasis. No significant pleural fluid or pneumothorax. No suspicious bony lesions.     Mild interstitial densities in the lung bases are likely related to atelectasis.     Assessment/Plan     Anticipate that patient will needLess than 2 midnights for management of the discussed medical issues.    This dictation was created using voice recognition software. The accuracy of the dictation is limited to the abilities of the software. I expect there may be some errors of grammar and possibly content.    * Chest pain- (present on admission)   Assessment & Plan    Patient has multiple risk factors for cardiovascular disease including hypertension, diabetes mellitus, COPD with a long-standing tobacco history and obesity. She'll be monitored closely on the telemetry floor for evidence  of any cardiac dysrhythmias. I will trend troponin levels as well as obtain serial EKGs. I have started her on an aspirin and a statin and she will receive as needed morphine, oxygen, and nitroglycerin. I will check a TSH and a lipid panel. If her cardiac enzymes remained negative in the morning, then all further risk stratify her with a nuclear medicine stress test.        Chronic hypertension- (present on admission)   Assessment & Plan    Blood pressure is currently mildly elevated but acceptable, continue Norvasc and Prinivil. I will monitor and adjust medications as needed.        COPD (chronic obstructive pulmonary disease) (CMS-HCC)- (present on admission)   Assessment & Plan    No acute exacerbation from baseline, continue home inhalers and respiratory therapy protocol as needed.        DM type 2, uncontrolled, with renal complications (CMS-HCC)- (present on admission)   Assessment & Plan    I'm holding the patient's oral hypoglycemics until she completes her nuclear medicine stress test. In the meantime, I will continue her home Lantus, monitor with Accu-Cheks and cover with insulin sliding scale.        Hypothyroidism- (present on admission)   Assessment & Plan    This is chronic and stable, checking TSH and continuing home Synthroid.          Prophylaxis: Sequential compression devices for DVT prophylaxis, no PPI indicated, stool softeners ordered

## 2017-11-12 NOTE — ASSESSMENT & PLAN NOTE
No acute exacerbation from baseline, continue home inhalers and respiratory therapy protocol as needed.

## 2017-11-12 NOTE — ED PROVIDER NOTES
ED Provider Note    Scribed for Juanito Cunha M.D. by Mahin Alcantara. 11/11/2017, 10:43 PM.    Primary care provider: Kavitha Mccall M.D.  Means of arrival: Ambulance  History obtained from: Patient  History limited by: None    CHIEF COMPLAINT  Chief Complaint   Patient presents with   • Chest Pain     Pt bib ems from home. Pt complained of chest pain that has been present since about 1400hrs today. Pt states that after she vomited the Pepperoni hot pockets that she had for lunch up she felt much better.        HPI  Priya Callahan is a 67 y.o. female brought in by ambulance to the Emergency Department with chest pain. Her symptoms onset with nausea this morning and she had an episode of emesis after lunch. The patient reports feeling improved after the episode of emesis. She then developed chest pain this afternoon at 2:00 PM. The patient reports her pain has been constant and worsening since onset. Her pain is located in the epigastrium and she reports a history of GERD. She describes her pain as a burning sensation. She denies diarrhea, fever, chills, leg swelling, numbness, or other symptoms. The patient has a history of COPD, sepsis, pneumonia, and hypertension.    REVIEW OF SYSTEMS  See HPI for further details. All other systems are negative. C.    PAST MEDICAL HISTORY   has a past medical history of Arthritis; ASTHMA; Backpain; Breath shortness; Bronchitis; Congestive heart failure (CMS-[facility]) (2013); COPD; Diabetes; Disorder of thyroid; Fall; Fibromyalgia; GERD (gastroesophageal reflux disease); Heart burn; Hepatitis C; Hypertension; Indigestion; Infectious disease; Neuropathy (CMS-HCC); On home oxygen therapy; Other specified symptom associated with female genital organs; Pain (9/13/2016); PND (post-nasal drip); Pneumonia; Psychiatric problem (9/13/2016); RLS (restless legs syndrome); Sleep apnea; and Unspecified urinary incontinence.    SURGICAL HISTORY   has a past surgical history that  includes gyn surgery; other orthopedic surgery; other orthopedic surgery; us-needle core bx-breast panel; lumpectomy (Left); inj dx/ther agnt paravert facet joint, bruce* (Left, 7/10/2015); inj dx/ther agnt paravert facet joint, bruce* (7/10/2015); inj dx/ther agnt paravert facet joint, bruce* (7/10/2015); inject nerv blck,othr periph nerv (7/10/2015); inj dx/ther agnt paravert facet joint, bruce* (Left, 7/17/2015); inj dx/ther agnt paravert facet joint, bruce* (7/17/2015); inj dx/ther agnt paravert facet joint, bruce* (7/17/2015); inject nerv blck,othr periph nerv (7/17/2015); dstr nrolytc agnt parverteb fct sngl lmbr/sacral (Left, 10/2/2015); dstr nrolytc agnt parverteb fct addl lmbr/sacral (10/2/2015); inject rx other periph nerve (10/2/2015); dstr nrolytc agnt parverteb fct addl lmbr/sacral (10/2/2015); dstr nrolytc agnt parverteb fct sngl lmbr/sacral (Right, 11/6/2015); dstr nrolytc agnt parverteb fct addl lmbr/sacral (11/6/2015); inject rx other periph nerve (11/6/2015); dstr nrolytc agnt parverteb fct addl lmbr/sacral (11/6/2015); dstr nrolytc agnt parverteb fct sngl lmbr/sacral (Left, 9/16/2016); dstr nrolytc agnt parverteb fct addl lmbr/sacral (9/16/2016); dstr nrolytc agnt parverteb fct addl lmbr/sacral (9/16/2016); fluoroscopic guidance needle placement (9/16/2016); and ankle orif (Left, 5/8/2017).    SOCIAL HISTORY  Social History   Substance Use Topics   • Smoking status: Former Smoker     Packs/day: 1.00     Years: 50.00     Types: Cigarettes     Quit date: 2/1/2017   • Smokeless tobacco: Never Used   • Alcohol use No      History   Drug Use No       FAMILY HISTORY  Family History   Problem Relation Age of Onset   • Lung Disease Mother    • Alcohol/Drug Mother    • Heart Disease Mother    • Cancer Father    • Cancer Brother    • Diabetes Maternal Grandmother    • Stroke Paternal Grandmother        CURRENT MEDICATIONS  Reviewed.  See Encounter Summary.     ALLERGIES  Allergies   Allergen Reactions   • Vitamin C Diarrhea  "    \"violent diarrhea\"   • Keflex Rash     Severe rash, but TOLERATES PENICILLINS, Rocephin    • Codeine Nausea     Severe stomach pain   • Lyrica Nausea     Nausea, dizzy   • Sudafed Nausea and Unspecified     Chest pain, nausea       PHYSICAL EXAM  VITAL SIGNS: BP (!) 166/60   Pulse 65   Resp 17   Ht 1.651 m (5' 5\")   Wt 90.7 kg (200 lb)   SpO2 100%   BMI 33.28 kg/m²   Constitutional: Alert in mild distress.  HENT: No signs of trauma, Bilateral external ears normal, Nose normal.   Eyes: Pupils are equal and reactive, Conjunctiva normal, Non-icteric.   Neck: Normal range of motion, No tenderness, Supple, No stridor.   Lymphatic: No lymphadenopathy noted.   Cardiovascular: Regular rate and rhythm, no murmurs.   Thorax & Lungs: Patient on supplemental nasal canula oxygen. Normal breath sounds, No respiratory distress, No wheezing, No chest tenderness.   Abdomen: Bowel sounds normal, Soft, No tenderness, No masses, No pulsatile masses. No peritoneal signs.  Skin: Warm, Dry, No erythema, No rash.   Back: No bony tenderness, No CVA tenderness.   Extremities: Intact distal pulses, No edema, No tenderness, No cyanosis  Musculoskeletal: Good range of motion in all major joints. No tenderness to palpation or major deformities noted.   Neurologic: Alert , Normal motor function, Normal sensory function, No focal deficits noted.   Psychiatric: Affect normal, Judgment normal, Mood normal.     DIAGNOSTIC STUDIES / PROCEDURES     LABS  Results for orders placed or performed during the hospital encounter of 11/11/17   Troponin   Result Value Ref Range    Troponin I <0.01 0.00 - 0.04 ng/mL   Btype Natriuretic Peptide   Result Value Ref Range    B Natriuretic Peptide 85 0 - 100 pg/mL   CBC with Differential   Result Value Ref Range    WBC 13.1 (H) 4.8 - 10.8 K/uL    RBC 3.80 (L) 4.20 - 5.40 M/uL    Hemoglobin 11.4 (L) 12.0 - 16.0 g/dL    Hematocrit 34.0 (L) 37.0 - 47.0 %    MCV 89.5 81.4 - 97.8 fL    MCH 30.0 27.0 - 33.0 pg    " MCHC 33.5 (L) 33.6 - 35.0 g/dL    RDW 42.8 35.9 - 50.0 fL    Platelet Count 253 164 - 446 K/uL    MPV 8.5 (L) 9.0 - 12.9 fL    Neutrophils-Polys 87.40 (H) 44.00 - 72.00 %    Lymphocytes 6.30 (L) 22.00 - 41.00 %    Monocytes 4.00 0.00 - 13.40 %    Eosinophils 1.00 0.00 - 6.90 %    Basophils 0.40 0.00 - 1.80 %    Immature Granulocytes 0.90 0.00 - 0.90 %    Nucleated RBC 0.00 /100 WBC    Neutrophils (Absolute) 11.46 (H) 2.00 - 7.15 K/uL    Lymphs (Absolute) 0.82 (L) 1.00 - 4.80 K/uL    Monos (Absolute) 0.52 0.00 - 0.85 K/uL    Eos (Absolute) 0.13 0.00 - 0.51 K/uL    Baso (Absolute) 0.05 0.00 - 0.12 K/uL    Immature Granulocytes (abs) 0.12 (H) 0.00 - 0.11 K/uL    NRBC (Absolute) 0.00 K/uL   Complete Metabolic Panel (CMP)   Result Value Ref Range    Sodium 135 135 - 145 mmol/L    Potassium 4.3 3.6 - 5.5 mmol/L    Chloride 102 96 - 112 mmol/L    Co2 24 20 - 33 mmol/L    Anion Gap 9.0 0.0 - 11.9    Glucose 233 (H) 65 - 99 mg/dL    Bun 23 (H) 8 - 22 mg/dL    Creatinine 1.19 0.50 - 1.40 mg/dL    Calcium 9.0 8.5 - 10.5 mg/dL    AST(SGOT) 35 12 - 45 U/L    ALT(SGPT) 21 2 - 50 U/L    Alkaline Phosphatase 61 30 - 99 U/L    Total Bilirubin 0.5 0.1 - 1.5 mg/dL    Albumin 3.5 3.2 - 4.9 g/dL    Total Protein 6.8 6.0 - 8.2 g/dL    Globulin 3.3 1.9 - 3.5 g/dL    A-G Ratio 1.1 g/dL   Prothrombin Time   Result Value Ref Range    PT 12.7 12.0 - 14.6 sec    INR 0.98 0.87 - 1.13   APTT   Result Value Ref Range    APTT 31.3 24.7 - 36.0 sec   Lipase   Result Value Ref Range    Lipase 27 11 - 82 U/L   ESTIMATED GFR   Result Value Ref Range    GFR If  55 (A) >60 mL/min/1.73 m 2    GFR If Non African American 45 (A) >60 mL/min/1.73 m 2   EKG (ER)   Result Value Ref Range    Report       [facility] Emergency Dept.    Test Date:  2017-11-12  Pt Name:    CHAITANYA CABELLO            Department: ER  MRN:        [id number]                      Room:       [facility]  Gender:     F                            Technician:  [id number]  :        1950                   Requested By:BRANDON KINNEY  Order #:    [id number]                    Reading MD:    Measurements  Intervals                                Axis  Rate:       71                           P:          36  NH:         200                          QRS:        22  QRSD:       84                           T:          101  QT:         420  QTc:        457    Interpretive Statements  SINUS RHYTHM  BORDERLINE T WAVE ABNORMALITIES  Compared to ECG 2017 22:40:43  Sinus bradycardia no longer present  Ventricular premature complex(es) no longer present  T-wave abnormality still present        All labs were reviewed by me.    EKG  Time: 22:40  12 Lead EKG interpreted by me to show:  Sinus bradycardia   Rate 55  Axis: Normal  Intervals: Normal  Ectopy: PAC and PVC  Nonspecific anterolateral changes, persistent minimal ST elevation in V2-V4.    EKG Interpretation:  Interpreted by me  Time: 00:31  Rhythm:  Normal sinus rhythm   Rate: 71  Axis: normal  Ectopy: PACs. No PVCs  Conduction: normal  Nonspecific ST changes.  Otherwise unchanged from above    RADIOLOGY  CT-ABDOMEN-PELVIS WITH   Final Result      1.  No acute intra-abdominal findings.      2.  Diverticulosis.      3.  Indeterminate 1.4 cm hypodense lesion in the left kidney, likely a proteinaceous cyst. Nonemergent renal protocol MRI could be performed for further evaluation.      4.  Atherosclerosis.      5.  Patchy right basilar nodules have decreased from the prior exam in 2015. Findings likely represent a chronic infectious or inflammatory process.      DX-CHEST-LIMITED (1 VIEW)   Final Result      Mild interstitial densities in the lung bases are likely related to atelectasis.        The radiologist's interpretation of all radiological studies and images have been reviewed by me.    COURSE & MEDICAL DECISION MAKING  Pertinent Labs & Imaging studies reviewed. (See chart for details)    Differential diagnoses include  but are not limited to: GERD, MI, ACS,     10:43 PM - Patient seen and examined at bedside. I explained to the patient I will treat her symptoms and order lab and imaging workup to evaluate. Patient will be treated with GI cocktail 30 mL PO. Ordered DX-chest, CBC, CMP, troponin, BNP, lipase, prothrombin time, APTT, EKG to evaluate her symptoms.    2:05 AM Recheck: Patient is resting comfortably. I updated her on her results and explained that she will be admitted for further care and evaluation. She understands and agrees.      2:09 AM Spoke with Dr. Quiñones, hospitalist, concerning patient case. Agreed to admit patient for further treatment and evaluation.     Decision Making:  This is a 67 y.o. year old female who presents with complaints of mid upper abdominal and chest discomfort. Considerations include both cardiopulmonary as well as GI bleeding suspicions. Screening EKG as noted above. Chest x-ray showed some right basilar nodules suspicious for inflammatory process which may be chronic. I have a low suspicion that this is an acute change given comparisons to prior. Initial troponin is negative. The patient does have chronic pain and recurrently asked for narcotics however I withheld during the initial evaluation. She did have relief of some of her discomfort with the GI cocktail increasing my suspicion about possibility of more upper GI esophageal gastric inflammatory process. Given the patient's moderate heart score and known vascular disease however the patient evaluated by hospital service for ongoing inpatient care and rule out.    DISPOSITION:  Patient will be admitted to Dr. Quiñones in guarded condition.     FINAL IMPRESSION  1. Chest pain, unspecified type    2. Hyperglycemia    3. Chronic pain syndrome    2. Right basilar nodules, likely inflammatory      I, Mahin Alcantara (Alla), am scribing for, and in the presence of, Juanito Cunha M.D..    Electronically signed by: Mahin Alcantara (Alla),  11/11/2017    IJuanito M.D. personally performed the services described in this documentation, as scribed by Mahin Alcantara in my presence, and it is both accurate and complete.    The note accurately reflects work and decisions made by me.  Juanito Cunha  11/12/2017  3:23 AM

## 2017-11-12 NOTE — ASSESSMENT & PLAN NOTE
Blood pressure is currently mildly elevated but acceptable, continue Norvasc and Prinivil. I will monitor and adjust medications as needed.

## 2017-11-12 NOTE — ED NOTES
Pt resting comfortably in bed, no s/s of distress noted. Monitors in place, VSS, will continue to monitor.

## 2017-11-13 LAB — EKG IMPRESSION: NORMAL

## 2017-11-13 NOTE — DISCHARGE SUMMARY
CHIEF COMPLAINT ON ADMISSION  Chief Complaint   Patient presents with   • Chest Pain     Pt bib ems from home. Pt complained of chest pain that has been present since about 1400hrs today. Pt states that after she vomited the Pepperoni hot pockets that she had for lunch up she felt much better.        CODE STATUS  Full Code    HPI & HOSPITAL COURSE  67 y.o. female who presented on 11/11/2017 with Chest pain. The patient states that her symptoms began at approximately 3:00 this afternoon while she was at rest. She has a history of acid reflux but she states that this pain felt much worse than her usual symptoms. She describes it as a heavy pressure that was nonradiating. She drank some soda and token a Rolaids without any alleviation of her symptoms. It was aggravated with movement. She denies any associated diaphoresis shortness of breath or lightheadedness but does report some nausea and vomiting. She called the RN hotline and was instructed to call 911. Prior to this, the patient was in her usual state of health, she does have some nasal congestion but otherwise denies any recent URIs, fevers, chills, diarrhea, constipation, or dysuria. Her substernal chest pain was reproducible.    He stress test negative    Wbc 13    Repeat wbc 24 hours later--> 12.8  Patient will be treated for possible acute bronchitis    Therefore, she is discharged in good and stable condition with close outpatient follow-up.    SPECIFIC OUTPATIENT FOLLOW-UP  pcp 1 week    DISCHARGE PROBLEM LIST  Principal Problem (Resolved):    Chest pain POA: Yes  Active Problems:    DM type 2, uncontrolled, with renal complications (CMS-[facility]) (Chronic) POA: Yes    COPD (chronic obstructive pulmonary disease) (CMS-[facility]) (Chronic) POA: Yes      Overview: On oxygen 3L nocturnal    Chronic hypertension (Chronic) POA: Yes    Hypothyroidism (Chronic) POA: Yes  acute bronchitis    FOLLOW UP  Future Appointments  Date Time Provider Department Center   11/15/2017  1:20 PM JAYCOB Saldaña [facility]   1/15/2018 2:00 PM Brian Olea M.D. [facility] None     No follow-up provider specified.    MEDICATIONS ON DISCHARGE   [place]   Home Medication Instructions JAY:[id number]    Printed on:11/12/17 [id number]   Medication Information                      amlodipine (NORVASC) 10 MG Tab  Take 1 Tab by mouth every day.             Biotin 5000 MCG Cap  Take 5,000 mcg by mouth every bedtime.             budesonide-formoterol (SYMBICORT) 160-4.5 MCG/ACT Aerosol  Inhale 2 Puffs by mouth 2 Times a Day.             Cholecalciferol (VITAMIN D3) 1000 units Cap  Take 1,000 Units by mouth every day.             Cyanocobalamin (VITAMIN B-12) 1000 MCG Tab  Take 1,000 mcg by mouth every evening.             cyclobenzaprine (FLEXERIL) 10 MG Tab  Take 10 mg by mouth 2 Times a Day.             duloxetine (CYMBALTA) 60 MG Cap DR Particles delayed-release capsule  Take 1 Cap by mouth every day.             famotidine (PEPCID) 20 MG Tab  Take 1 Tab by mouth 2 times a day.             hydrochlorothiazide (HYDRODIURIL) 12.5 MG tablet  Take 1 Tab by mouth every day.             insulin glargine (LANTUS SOLOSTAR) 100 UNIT/ML Solution Pen-injector injection  Inject 20 Units as instructed every evening.             levofloxacin (LEVAQUIN) 500 MG tablet  Take 1 Tab by mouth every day.             levothyroxine (SYNTHROID) 75 MCG Tab  TAKE 1 TABLET BY MOUTH DAILY             lisinopril (PRINIVIL, ZESTRIL) 40 MG tablet  Take 40 mg by mouth every day.             oxycodone immediate release (ROXICODONE) 10 MG immediate release tablet  Take 20 mg by mouth 2 Times a Day.             potassium chloride SA (K-DUR) 10 MEQ Tab CR  Take 1 Tab by mouth every day.             tiotropium (SPIRIVA) 18 MCG Cap  Inhale 1 Cap by mouth every day.             trazodone (DESYREL) 150 MG Tab  Take 300 mg by mouth every bedtime.                 DIET  Orders Placed This Encounter   Procedures   • Protocol 1313 Diet  Order: Reg, No Decaf, No Caffeine- Cardiac Stress Test: Pharmacological     Standing Status:   Standing     Number of Occurrences:   1     Order Specific Question:   Diet:     Answer:   Regular [1]     Order Specific Question:   Miscellaneous modifications:     Answer:   No Decaf, No Caffeine(for test) [11]     Comments:   Protocol 1313 Patient to have no caffeine for 12 hours prior to exam (decaf, coffee, cola, tea, chocolate)       ACTIVITY  As tolerated.  Weight bearing as tolerated      CONSULTATIONS  none    PROCEDURES  Patient Information     Patient Name  Chaitanya Cabello ([id number]) Sex  Female   1950   Room Bed Code Status Current Location   T215 00 FULL NUCLEAR MEDICINE [facility]   Linked Documents     View SynapseCV Report   Last Resulted Time   Sun 2017  3:08 PM   Images     Show images for NM-CARDIAC STRESS TEST   Imaging Result Status     Status: Edited Result - FINAL (Exam End: 2017  1:08 PM)   Imaging Previous Results     Open Hard Copy Result Report (Order #[id number] - NM-CARDIAC STRESS TEST)   Narrative                      Myocardial Perfusion   Report   NUCLEAR IMAGING INTERPRETATION   No myocardial infarct or reversible ischemia.   Normal LEFT ventricular function.   ECG INTERPRETATION   Negative stress ECG for ischemia.     CHAITANYA CABELLO     MRN:    [id number]         Gender:    F     Exam Date: 2017 06:30     Exam Location:      Inpatient     Ordering Phys:     OLIVIA KATE     NucMed Tech:       RT Jamie                       (R,N)     Age:    67    :    1950        Ht (in):     65     Wt (lb):     206    BMI:    34.31       Radiologist     Risk Factors:             Diabetes, Hypertension, Obesity     Indications:              Chest Pain - Tightness/Pressure/Discomfort     ICD Codes:                786.59     Cardiac History:          Chest Pain, Dyspnea     Cardiac Meds:     Meds Past 24 hrs:     Pretest Chest Pain:       No  symptoms     STRESS TEST      Pharmacologi                    veronica Crandalliscan w/    Dose: 0.4 mg   ol:      Exer                         Post-Injection Exercise:        An additional 1 minutes of exercise followed   the                                    intravenous injection               Resting HR (bpm):      78     Peak HR (bpm):         93     Resting BP (mmHg):       154    /   85     Peak BP (mmHg):       154   /   85     MaxPHR:     152     Target HR (bpm):       129     % MaxPHR:     61     Double Product:       26161     BP Response:     Stress Termination:       Protocol completed     Stress Symptoms:   Dyspnea Fatigue Flushing HEADACHE     ECG     Resting ECG:     Sinus rhythm. Nonspect T abn.     Stress ECG:      No ischemic changes with Regadenoson.     IMAGE PROTOCOL      Rest/Stress 1                        Day             RadiopharmaceuticalDose (mCi)   Imaging  Date      Imaging  Time           Inj to Img Time (min)   Rest:   Tc-99m             6.9          12-Nov-2017        11:15                   20           Tetrofosmin   Stress: Tc-99m             26.2         12-Nov-2017        12:00                   30           Tetrofosmin     Rest:   Administration Site:       Right wrist   Administered by:      Carmelo Corado RN     Stress:   Administration Site:       Right wrist   Administered by:      Carmelo Corado RN     % Percent HR Achieved:   SPECT RESULTS     Technical Quality:       Good     Raw Data Analysis:   Summed Stress Score:    0   Summed Rest Score:    0   Summed Difference Score:        2   PERFUSION:   Mild decreased activity in the inferolateral LEFT ventricle, likely artifact.       No reversible perfusion defect.     FUNCTIONAL RESULTS (calculated via Gated SPECT)     Stress Image LV EF:        58     %     Upper Normal Limit     Stress EDV:      100    ml   EDVI:    50      ml/m²     Stress ESV:      42     ml   ESVI:    21      ml/m²     TID:    1.06   TID - 1.19       TID (ed) - 1.23   LV Function:   LEFT ventricular cavity size is within normal limits.    Gated images show normal LEFT ventricular wall motion and thickening.                   Loco Fox   Edited by: Vicente Fonseca   (Electronically Signed)   Final Date:      12 November 2017                     13:21   Amended:         12 November 2017 15:08   Reading Provider Reading Date   No Reading Provider Prelim Nov 12, 2017   Loco Fox M.D. Nov 12, 2017   Vicente Fonseca M.D. Nov 12, 2017   Signing Provider Signing Date Signing Time   Loco Fox M.D. Nov 12, 2017  1:21 PM   Vicente Fonseca M.D. Nov 12, 2017  3:08 PM   Order CC Information     Recipient Phone   Camryn Quiñones M.D. [phone]   Juanito Cunha M.D.          LABORATORY  Lab Results   Component Value Date/Time    SODIUM 135 11/11/2017 10:53 PM    POTASSIUM 4.3 11/11/2017 10:53 PM    CHLORIDE 102 11/11/2017 10:53 PM    CO2 24 11/11/2017 10:53 PM    GLUCOSE 233 (H) 11/11/2017 10:53 PM    BUN 23 (H) 11/11/2017 10:53 PM    CREATININE 1.19 11/11/2017 10:53 PM        Lab Results   Component Value Date/Time    WBC 12.8 (H) 11/12/2017 03:50 PM    HEMOGLOBIN 11.9 (L) 11/12/2017 03:50 PM    HEMATOCRIT 35.0 (L) 11/12/2017 03:50 PM    PLATELETCT 249 11/12/2017 03:50 PM        Total time of the discharge process exceeds 38 minutes

## 2017-11-13 NOTE — DISCHARGE INSTRUCTIONS
Discharge Instructions    Discharged to home by car with relative. Discharged via walking, hospital escort: Refused.  Special equipment needed: Not Applicable    Be sure to schedule a follow-up appointment with your primary care doctor or any specialists as instructed.     Discharge Plan:   Diet Plan: Discussed (Diabetic)  Activity Level: Discussed (As tolerated)  Smoking Cessation Offered: Patient Refused  Confirmed Follow up Appointment: Patient to Call and Schedule Appointment  Confirmed Symptoms Management: Discussed  Medication Reconciliation Updated: Yes  Influenza Vaccine Indication: Not indicated: Previously immunized this influenza season and > 8 years of age    I understand that a diet low in cholesterol, fat, and sodium is recommended for good health. Unless I have been given specific instructions below for another diet, I accept this instruction as my diet prescription.   Other diet: Diabetic    Special Instructions: None    · Is patient discharged on Warfarin / Coumadin?   No     · Is patient Post Blood Transfusion?  No    Depression / Suicide Risk    As you are discharged from this [facility] facility, it is important to learn how to keep safe from harming yourself.    Recognize the warning signs:  · Abrupt changes in personality, positive or negative- including increase in energy   · Giving away possessions  · Change in eating patterns- significant weight changes-  positive or negative  · Change in sleeping patterns- unable to sleep or sleeping all the time   · Unwillingness or inability to communicate  · Depression  · Unusual sadness, discouragement and loneliness  · Talk of wanting to die  · Neglect of personal appearance   · Rebelliousness- reckless behavior  · Withdrawal from people/activities they love  · Confusion- inability to concentrate     If you or a loved one observes any of these behaviors or has concerns about self-harm, here's what you can do:  · Talk about it- your feelings and  reasons for harming yourself  · Remove any means that you might use to hurt yourself (examples: pills, rope, extension cords, firearm)  · Get professional help from the community (Mental Health, Substance Abuse, psychological counseling)  · Do not be alone:Call your Safe Contact- someone whom you trust who will be there for you.  · Call your local CRISIS HOTLINE 551-6515 or 188-382-3741  · Call your local Children's Mobile Crisis Response Team [place] (937) 014-5112 or www.[place]  · Call the toll free National Suicide Prevention Hotlines   · National Suicide Prevention Lifeline 625-078-HVLH (9521)  · National Hope Line Network 800-SUICIDE (719-9234)

## 2017-11-13 NOTE — PROGRESS NOTES
Pt discharged to home. IV d/c'd, pt armband removed. Pt and family understand written and verbal d/c instructions.  Prescriptions sent to pt verified pharmacy.  Regular diet, activity as tolerated.  Pt to follow up with PCP.  Pt instructed not drive after taking any narcotics.  Pt verbalized understanding.

## 2017-11-15 ENCOUNTER — TELEPHONE (OUTPATIENT)
Dept: MEDICAL GROUP | Facility: PHYSICIAN GROUP | Age: 67
End: 2017-11-15

## 2017-11-15 ENCOUNTER — OFFICE VISIT (OUTPATIENT)
Dept: MEDICAL GROUP | Facility: PHYSICIAN GROUP | Age: 67
End: 2017-11-15
Payer: MEDICARE

## 2017-11-15 VITALS
OXYGEN SATURATION: 95 % | BODY MASS INDEX: 35.09 KG/M2 | WEIGHT: 210.6 LBS | DIASTOLIC BLOOD PRESSURE: 58 MMHG | SYSTOLIC BLOOD PRESSURE: 122 MMHG | HEIGHT: 65 IN | TEMPERATURE: 97.8 F | HEART RATE: 84 BPM | RESPIRATION RATE: 24 BRPM

## 2017-11-15 DIAGNOSIS — D63.8 ANEMIA OF CHRONIC DISEASE: Chronic | ICD-10-CM

## 2017-11-15 DIAGNOSIS — Z20.5 EXPOSURE TO HEPATITIS C: ICD-10-CM

## 2017-11-15 DIAGNOSIS — Z09 HOSPITAL DISCHARGE FOLLOW-UP: ICD-10-CM

## 2017-11-15 DIAGNOSIS — F11.90 CHRONIC, CONTINUOUS USE OF OPIOIDS: ICD-10-CM

## 2017-11-15 PROCEDURE — 99214 OFFICE O/P EST MOD 30 MIN: CPT | Performed by: FAMILY MEDICINE

## 2017-11-15 RX ORDER — OXYCODONE HYDROCHLORIDE 10 MG/1
20 TABLET ORAL 2 TIMES DAILY
Qty: 120 TAB | Refills: 0 | Status: SHIPPED | OUTPATIENT
Start: 2017-12-20 | End: 2017-11-15 | Stop reason: SDUPTHER

## 2017-11-15 RX ORDER — OXYCODONE HYDROCHLORIDE 10 MG/1
20 TABLET ORAL 2 TIMES DAILY
Qty: 120 TAB | Refills: 0 | Status: SHIPPED | OUTPATIENT
Start: 2017-11-20 | End: 2017-11-15 | Stop reason: SDUPTHER

## 2017-11-15 RX ORDER — OXYCODONE HYDROCHLORIDE 10 MG/1
20 TABLET ORAL 2 TIMES DAILY
Qty: 120 TAB | Refills: 0 | Status: SHIPPED | OUTPATIENT
Start: 2018-01-20 | End: 2018-02-14 | Stop reason: SDUPTHER

## 2017-11-15 RX ORDER — LACTULOSE 10 G/15ML
20 SOLUTION ORAL 2 TIMES DAILY
Qty: 1 BOTTLE | Refills: 3 | Status: SHIPPED | OUTPATIENT
Start: 2017-11-15 | End: 2018-12-04 | Stop reason: SDUPTHER

## 2017-11-15 NOTE — PROGRESS NOTES
Subjective:   Priya Callahan is a 67 y.o. female here today for evaluation and management of:     Hospital discharge follow-up  Hospitalized recently for chest pain. Had negative stress test. 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   She is doing well today. She continues on oxygen supplementation 2L at rest and 3L when active.   Refills for pain meds done: using more due to cold weather. Also start gabapentin again for pain. Advised not to increase more than 120 pills in a month as that is already at 60 MME, has appt with dr. doc mccullough with pain management.   Refill for lactulose due to constipation due to the opioid  GFR is improved to 45 had been as low as 20s in the past.     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 This was back in the 80s,   recently LFTS normal  Patient requests check on Hep C  Lab ordered.     Anemia of chronic disease  FIT negative a few times this year. She did not have colonoscopy done as it was too expensive.   Anemia almost resolve.    Ref. Range 6/30/2017 08:47 7/2/2017 01:53 8/15/2017 15:30 11/11/2017 22:53 11/12/2017 15:50   Hemoglobin Latest Ref Range: 12.0 - 16.0 g/dL 9.5 (L) 9.8 (L) 10.2 (L) 11.4 (L) 11.9 (L)   Hematocrit Latest Ref Range: 37.0 - 47.0 % 30.7 (L) 31.2 (L) 32.5 (L) 34.0 (L) 35.0 (L)     She had stopped her iron and advised her to restart this. She has a follow up with hematology.        She states her sugars have been uncontrolled due to her poor diet habits recently. Encouraged her to maintain good control of blood sugars continue with lantus.       Current medicines (including changes today)  Current Outpatient Prescriptions   Medication Sig  Dispense Refill   • [START ON 1/20/2018] oxycodone immediate release (ROXICODONE) 10 MG immediate release tablet Take 2 Tabs by mouth 2 Times a Day for 30 days. 120 Tab 0   • lactulose 10 GM/15ML Solution Take 30 mL by mouth 2 times a day. 1 Bottle 3   • Cholecalciferol (VITAMIN D3) 1000 units Cap Take 1,000 Units by mouth every day.     • cyclobenzaprine (FLEXERIL) 10 MG Tab Take 10 mg by mouth 2 Times a Day.     • insulin glargine (LANTUS SOLOSTAR) 100 UNIT/ML Solution Pen-injector injection Inject 20 Units as instructed every evening.     • trazodone (DESYREL) 150 MG Tab Take 300 mg by mouth every bedtime.     • famotidine (PEPCID) 20 MG Tab Take 1 Tab by mouth 2 times a day. 60 Tab 3   • hydrochlorothiazide (HYDRODIURIL) 12.5 MG tablet Take 1 Tab by mouth every day. 30 Tab 3   • potassium chloride SA (K-DUR) 10 MEQ Tab CR Take 1 Tab by mouth every day. 30 Tab 3   • levofloxacin (LEVAQUIN) 500 MG tablet Take 1 Tab by mouth every day. 5 Tab 0   • duloxetine (CYMBALTA) 60 MG Cap DR Particles delayed-release capsule Take 1 Cap by mouth every day. 90 Cap 1   • lisinopril (PRINIVIL, ZESTRIL) 40 MG tablet Take 40 mg by mouth every day.     • Biotin 5000 MCG Cap Take 5,000 mcg by mouth every bedtime.     • budesonide-formoterol (SYMBICORT) 160-4.5 MCG/ACT Aerosol Inhale 2 Puffs by mouth 2 Times a Day. 1 Inhaler 11   • amlodipine (NORVASC) 10 MG Tab Take 1 Tab by mouth every day. 90 Tab 1   • Cyanocobalamin (VITAMIN B-12) 1000 MCG Tab Take 1,000 mcg by mouth every evening.     • tiotropium (SPIRIVA) 18 MCG Cap Inhale 1 Cap by mouth every day. 30 Cap 3   • levothyroxine (SYNTHROID) 75 MCG Tab TAKE 1 TABLET BY MOUTH DAILY 90 Tab 3     No current facility-administered medications for this visit.      She  has a past medical history of Arthritis; ASTHMA; Backpain; Breath shortness; Bronchitis; Congestive heart failure (CMS-[facility]) (2013); COPD; Diabetes; Disorder of thyroid; Fall; Fibromyalgia; GERD (gastroesophageal reflux  "disease); Heart burn; Hepatitis C; Hypertension; Indigestion; Infectious disease; Neuropathy (CMS-HCC); On home oxygen therapy; Other specified symptom associated with female genital organs; Pain (9/13/2016); PND (post-nasal drip); Pneumonia; Psychiatric problem (9/13/2016); RLS (restless legs syndrome); Sleep apnea; and Unspecified urinary incontinence.    ROS  No chest pain, no shortness of breath, no abdominal pain       Objective:     Blood pressure 122/58, pulse 84, temperature 36.6 °C (97.8 °F), resp. rate (!) 24, height 1.651 m (5' 5\"), weight 95.5 kg (210 lb 9.6 oz), SpO2 95 %. Body mass index is 35.05 kg/m².   Physical Exam:  Constitutional: Alert, no distress.  Skin: Warm, dry, good turgor, no rashes in visible areas.  Eye: Equal, round and reactive, conjunctiva clear, lids normal.  ENMT: Lips without lesions, good dentition, oropharynx clear.  Neck: Trachea midline, no masses, no thyromegaly. No cervical or supraclavicular lymphadenopathy  Respiratory: Unlabored respiratory effort, lungs clear to auscultation, no wheezes, no ronchi.  Cardiovascular: Normal S1, S2, no murmur, no edema.  Abdomen: Soft, non-tender, no masses, no hepatosplenomegaly.  Psych: Alert and oriented x3, normal affect and mood.        Assessment and Plan:   The following treatment plan was discussed    1. Hospital discharge follow-up  She is doing better but still weak after hospital evaluation.   Completed antibiotic levaquin for bronchitis.   She continues on oxygen supplementation.     2. Exposure to hepatitis C  - HEP C VIRUS ANTIBODY; Future    3. Chronic, continuous use of opioids  Refills provided for oxycodone immediate release 10 mg she takes two tablets twice a day. 120 pills for a month. 3 month refills provided.   Encouraged to use minimum needed for pain.   - [facility] PAIN [facility]NAGEMENT SCREEN; Future    4. Anemia of chronic disease  Improving, advised her to continue on iron supplementation until seen by her " hematologist.   H/H almost back to normal.       Followup: No Follow-up on file.

## 2017-11-15 NOTE — ASSESSMENT & PLAN NOTE
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 This was back in the 80s,   recently LFTS normal  Patient requests check on Hep C  Lab ordered.

## 2017-11-15 NOTE — ASSESSMENT & PLAN NOTE
Hospitalized recently for chest pain. Had negative stress test. 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   She is doing well today. She continues on oxygen supplementation 2L at rest and 3L when active.   Refills for pain meds done: using more due to cold weather. Also start gabapentin again for pain. Advised not to increase more than 120 pills in a month as that is already at 60 MME, has appt with dr. doc mccullough with pain management.   Refill for lactulose due to constipation due to the opioid  GFR is improved to 45 had been as low as 20s in the past.

## 2017-11-15 NOTE — ASSESSMENT & PLAN NOTE
FIT negative a few times this year. She did not have colonoscopy done as it was too expensive.   Anemia almost resolve.    Ref. Range 6/30/2017 08:47 7/2/2017 01:53 8/15/2017 15:30 11/11/2017 22:53 11/12/2017 15:50   Hemoglobin Latest Ref Range: 12.0 - 16.0 g/dL 9.5 (L) 9.8 (L) 10.2 (L) 11.4 (L) 11.9 (L)   Hematocrit Latest Ref Range: 37.0 - 47.0 % 30.7 (L) 31.2 (L) 32.5 (L) 34.0 (L) 35.0 (L)     She had stopped her iron and advised her to restart this. She has a follow up with hematology.

## 2017-11-15 NOTE — TELEPHONE ENCOUNTER
Please send copy of recent MRI to Dr. Xavier Arteaga (pain management specialist) Ph: [phone]  Kavitha Mccall M.D.

## 2017-12-12 ENCOUNTER — PATIENT OUTREACH (OUTPATIENT)
Dept: HEALTH INFORMATION MANAGEMENT | Facility: OTHER | Age: 67
End: 2017-12-12

## 2017-12-12 NOTE — PROGRESS NOTES
Follow-up call with pt regarding welfare paperwork pt completed with [facility] LSW. No answer, LVM with contact information requesting TC back.     Plan:  Will attempt to reach pt at a later time/date.

## 2017-12-19 RX ORDER — CYCLOBENZAPRINE HCL 10 MG
TABLET ORAL
Qty: 30 TAB | Refills: 0 | Status: SHIPPED | OUTPATIENT
Start: 2017-12-19 | End: 2019-01-21

## 2017-12-28 RX ORDER — TIOTROPIUM BROMIDE 18 UG/1
18 CAPSULE ORAL; RESPIRATORY (INHALATION) DAILY
Qty: 30 CAP | Refills: 3 | Status: SHIPPED | OUTPATIENT
Start: 2017-12-28 | End: 2018-03-29 | Stop reason: SDUPTHER

## 2018-01-04 ENCOUNTER — PATIENT OUTREACH (OUTPATIENT)
Dept: HEALTH INFORMATION MANAGEMENT | Facility: OTHER | Age: 68
End: 2018-01-04

## 2018-01-04 NOTE — PROGRESS NOTES
One month follow-up with pt to inquire if she was able to connect with [facility] LSW for assistance with expensive medications and signing up for Medicaid. Pt reports that she was never able to get assistance with these things as she reports that her asset limit was too much for Medicaid and assistance programs for medications. She reports having property in [place] that puts her over the limit. Pt also reports that she switched to [employer] insurance effective 01/01/2018 and no longer is a [employer] member. LSW discussed that this LSW will no longer be able to follow her due to her insurance change. Encouraged her to utilize the [facility] in [place] for additional supportive services she may need including transportation, food, etc. Pt verbalized understanding and reports knowing where the [facility] is located. Also encouraged pt to reach out to [employer] for any assistance she may need in the home. Pt agreeable to discharge. Encouraged her to call this LSW with any questions/concerns.     Plan:  LSW to discharge pt from  , due to pt changing insurance coverage to [employer]. LSW is also discharging pt as she is also reporting that she does not qualify for financial assistance such as Medicaid or for her medications, due to having too many assets.

## 2018-01-11 NOTE — TELEPHONE ENCOUNTER
Was the patient seen in the last year in this department? Yes     Does patient have an active prescription for medications requested? No     Received Request Via: Pharmacy      Pt met protocol?: Yes pt last ov 11/2017 last d/c in hospital   BP Readings from Last 1 Encounters:   11/15/17 122/58

## 2018-01-12 ENCOUNTER — TELEPHONE (OUTPATIENT)
Dept: MEDICAL GROUP | Facility: PHYSICIAN GROUP | Age: 68
End: 2018-01-12

## 2018-01-12 RX ORDER — LISINOPRIL 40 MG/1
40 TABLET ORAL DAILY
Qty: 90 TAB | Refills: 0 | Status: SHIPPED | OUTPATIENT
Start: 2018-01-12 | End: 2018-03-16 | Stop reason: SDUPTHER

## 2018-01-12 NOTE — TELEPHONE ENCOUNTER
Letter done for patient. In EMR under letters. Please print for her so she can pick it up, or mail it to her. Thank you.   Kavitha Mccall M.D.

## 2018-01-12 NOTE — TELEPHONE ENCOUNTER
----- Message from Tsering Warren, Med Ass't sent at 10/2/2017  3:28 PM PDT -----  Regarding: FW: Letter      ----- Message -----  From: Valorie Baca, Med Ass't  Sent: 9/28/2017  12:01 PM  To: Margarita Paz  Subject: Letter                                           Patient needs a letter stating she is disabled for the [employer] so mail can be delivered to her house.

## 2018-01-24 RX ORDER — TRAZODONE HYDROCHLORIDE 150 MG/1
TABLET ORAL
Qty: 180 TAB | Refills: 0 | Status: SHIPPED | OUTPATIENT
Start: 2018-01-24 | End: 2018-05-22 | Stop reason: SDUPTHER

## 2018-02-14 ENCOUNTER — OFFICE VISIT (OUTPATIENT)
Dept: MEDICAL GROUP | Facility: PHYSICIAN GROUP | Age: 68
End: 2018-02-14
Payer: MEDICARE

## 2018-02-14 VITALS
OXYGEN SATURATION: 94 % | TEMPERATURE: 98.4 F | HEART RATE: 81 BPM | DIASTOLIC BLOOD PRESSURE: 68 MMHG | BODY MASS INDEX: 34.99 KG/M2 | SYSTOLIC BLOOD PRESSURE: 124 MMHG | HEIGHT: 65 IN | RESPIRATION RATE: 18 BRPM | WEIGHT: 210 LBS

## 2018-02-14 DIAGNOSIS — E66.9 OBESITY (BMI 30-39.9): ICD-10-CM

## 2018-02-14 DIAGNOSIS — D64.9 ANEMIA, UNSPECIFIED TYPE: ICD-10-CM

## 2018-02-14 DIAGNOSIS — Z79.891 CHRONIC USE OF OPIATE DRUGS THERAPEUTIC PURPOSES: ICD-10-CM

## 2018-02-14 DIAGNOSIS — Z12.39 BREAST CANCER SCREENING: ICD-10-CM

## 2018-02-14 DIAGNOSIS — M25.561 CHRONIC PAIN OF RIGHT KNEE: ICD-10-CM

## 2018-02-14 DIAGNOSIS — G89.29 CHRONIC PAIN OF RIGHT KNEE: ICD-10-CM

## 2018-02-14 DIAGNOSIS — J43.9 PULMONARY EMPHYSEMA, UNSPECIFIED EMPHYSEMA TYPE (HCC): Chronic | ICD-10-CM

## 2018-02-14 PROCEDURE — 99214 OFFICE O/P EST MOD 30 MIN: CPT | Performed by: FAMILY MEDICINE

## 2018-02-14 RX ORDER — OXYCODONE HYDROCHLORIDE 10 MG/1
10 TABLET ORAL 3 TIMES DAILY PRN
Qty: 110 TAB | Refills: 0 | Status: SHIPPED | OUTPATIENT
Start: 2018-03-14 | End: 2018-02-14 | Stop reason: SDUPTHER

## 2018-02-14 RX ORDER — OXYCODONE HYDROCHLORIDE 10 MG/1
10 TABLET ORAL 3 TIMES DAILY PRN
Qty: 110 TAB | Refills: 0 | Status: SHIPPED | OUTPATIENT
Start: 2018-04-13 | End: 2018-05-13

## 2018-02-14 RX ORDER — OXYCODONE HYDROCHLORIDE 10 MG/1
10 TABLET ORAL 3 TIMES DAILY PRN
Qty: 110 TAB | Refills: 0 | Status: SHIPPED | OUTPATIENT
Start: 2018-02-14 | End: 2018-02-14 | Stop reason: SDUPTHER

## 2018-02-14 NOTE — ASSESSMENT & PLAN NOTE
She stopped her lantus as she forgets to take it  Recent A1c has increased to 7 from 5.2  GFR improved to 45. She is not on metformin.   Advised to avoid rootbeer.   She is due for her eye exam this year.   LDL is <70, she is not on a statin.

## 2018-02-14 NOTE — ASSESSMENT & PLAN NOTE
Patient has chronic use of chronic bilateral knee pain and is on a pain contract with clinic, she does not use alcohol or marijuana. She has been advised to try to wean down her pain medications.   She uses flexeril, tylenol and diclofenac topical. Should avoid nsaids due to CKD.   Refills done for 3 months oxycodone IR 10 mg 4 tablets a day 120 pills. Encouraged her to wean down.   Cannot use gabapentin due to shaking and falling down.    and uds reveiwed with no concerns  Advised her to avoid alcohol, marijuana, illicit drugs, if these are found on her UDS, we cannot continue prescription of controlled medications.

## 2018-02-14 NOTE — ASSESSMENT & PLAN NOTE
Patient has chronic use of chronic bilateral knee pain and is on a pain contract with clinic, she does not use alcohol or marijuana. She has been advised to try to wean down her pain medications.   She uses flexeril, tylenol and diclofenac topical. Should avoid nsaids due to CKD.   Refills done for 3 months oxycodone IR 10 mg 4 tablets a day 120 pills. Encouraged her to wean down.   Cannot use gabapentin due to shaking and falling down.

## 2018-02-14 NOTE — ASSESSMENT & PLAN NOTE
No acute exacerbations she continues on 3-4 liters of continuous oxygen supplementation.   She feels lower energy now as she started smoking again at the anniversary of her son's death when she was around other smokers, she is willing to try chantix.   Continues on inhlaers: symbicort.

## 2018-02-20 NOTE — PROGRESS NOTES
Subjective:   Priya Callahan is a 67 y.o. female here today for evaluation and management of:     DM type 2, uncontrolled, with renal complications (CMS-HCC)  She stopped her lantus as she forgets to take it  Recent A1c has increased to 7 from 5.2  GFR improved to 45. She is not on metformin.   Advised to avoid rootbeer.   She is due for her eye exam this year.   LDL is <70, she is not on a statin.     COPD (chronic obstructive pulmonary disease) (CMS-HCC)  No acute exacerbations she continues on 3-4 liters of continuous oxygen supplementation.   She feels lower energy now as she started smoking again at the anniversary of her son's death when she was around other smokers, she is willing to try chantix.   Continues on inhlaers: symbicort.     Chronic knee pain  Patient has chronic use of chronic bilateral knee pain and is on a pain contract with clinic, she does not use alcohol or marijuana. She has been advised to try to wean down her pain medications.   She uses flexeril, tylenol and diclofenac topical. Should avoid nsaids due to CKD.   Refills done for 3 months oxycodone IR 10 mg 4 tablets a day 120 pills. Encouraged her to wean down.   Cannot use gabapentin due to shaking and falling down.       Chronic use of opiate drugs therapeutic purposes  Patient has chronic use of chronic bilateral knee pain and is on a pain contract with clinic, she does not use alcohol or marijuana. She has been advised to try to wean down her pain medications.   She uses flexeril, tylenol and diclofenac topical. Should avoid nsaids due to CKD.   Refills done for 3 months oxycodone IR 10 mg 4 tablets a day 120 pills. Encouraged her to wean down.   Cannot use gabapentin due to shaking and falling down.    and uds reveiwed with no concerns  Advised her to avoid alcohol, marijuana, illicit drugs, if these are found on her UDS, we cannot continue prescription of controlled medications.          Current medicines (including  changes today)  Current Outpatient Prescriptions   Medication Sig Dispense Refill   • Diclofenac Sodium 1 % Gel Apply  to skin as directed.     • [START ON 4/13/2018] oxyCODONE immediate release (ROXICODONE) 10 MG immediate release tablet Take 1 Tab by mouth 3 times a day as needed for Moderate Pain (continue to wean down) for up to 30 days. 110 Tab 0   • trazodone (DESYREL) 150 MG Tab TAKE 2 TABLETS BY MOUTH NIGHTLY AT BEDTIME 180 Tab 0   • lisinopril (PRINIVIL, ZESTRIL) 40 MG tablet Take 1 Tab by mouth every day. 90 Tab 0   • tiotropium (SPIRIVA) 18 MCG Cap Inhale 1 Cap by mouth every day. 30 Cap 3   • cyclobenzaprine (FLEXERIL) 10 MG Tab TAKE 1 TABLET BY MOUTH TWO TIMES DAILY AS NEEDED 30 Tab 0   • lactulose 10 GM/15ML Solution Take 30 mL by mouth 2 times a day. 1 Bottle 3   • Cholecalciferol (VITAMIN D3) 1000 units Cap Take 1,000 Units by mouth every day.     • insulin glargine (LANTUS SOLOSTAR) 100 UNIT/ML Solution Pen-injector injection Inject 20 Units as instructed every evening.     • hydrochlorothiazide (HYDRODIURIL) 12.5 MG tablet Take 1 Tab by mouth every day. 30 Tab 3   • potassium chloride SA (K-DUR) 10 MEQ Tab CR Take 1 Tab by mouth every day. 30 Tab 3   • duloxetine (CYMBALTA) 60 MG Cap DR Particles delayed-release capsule Take 1 Cap by mouth every day. 90 Cap 1   • Biotin 5000 MCG Cap Take 5,000 mcg by mouth every bedtime.     • budesonide-formoterol (SYMBICORT) 160-4.5 MCG/ACT Aerosol Inhale 2 Puffs by mouth 2 Times a Day. 1 Inhaler 11   • amlodipine (NORVASC) 10 MG Tab Take 1 Tab by mouth every day. 90 Tab 1   • levothyroxine (SYNTHROID) 75 MCG Tab TAKE 1 TABLET BY MOUTH DAILY 90 Tab 3   • Cyanocobalamin (VITAMIN B-12) 1000 MCG Tab Take 1,000 mcg by mouth every evening.       No current facility-administered medications for this visit.      She  has a past medical history of Arthritis; ASTHMA; Backpain; Breath shortness; Bronchitis; Congestive heart failure (CMS-[facility]) (2013); COPD; Diabetes;  "Disorder of thyroid; Fall; Fibromyalgia; GERD (gastroesophageal reflux disease); Heart burn; Hepatitis C; Hypertension; Indigestion; Infectious disease; Neuropathy (CMS-HCC); On home oxygen therapy; Other specified symptom associated with female genital organs; Pain (9/13/2016); PND (post-nasal drip); Pneumonia; Psychiatric problem (9/13/2016); RLS (restless legs syndrome); Sleep apnea; and Unspecified urinary incontinence.    ROS  No chest pain, no shortness of breath, no abdominal pain       Objective:     Blood pressure 124/68, pulse 81, temperature 36.9 °C (98.4 °F), resp. rate 18, height 1.651 m (5' 5\"), weight 95.3 kg (210 lb), SpO2 94 %. Body mass index is 34.95 kg/m².   Physical Exam:  Constitutional: Alert, no distress.  Skin: Warm, dry, good turgor, no rashes in visible areas.  Eye: Equal, round and reactive, conjunctiva clear, lids normal.  ENMT: Lips without lesions, good dentition, oropharynx clear.  Neck: Trachea midline, no masses, no thyromegaly. No cervical or supraclavicular lymphadenopathy  Respiratory: Unlabored respiratory effort, lungs clear to auscultation, no wheezes, no ronchi.  Cardiovascular: Normal S1, S2, no murmur, no edema.  Abdomen: Soft, non-tender, no masses, no hepatosplenomegaly.  Psych: Alert and oriented x3, normal affect and mood.        Assessment and Plan:   The following treatment plan was discussed    1. Uncontrolled type 2 diabetes mellitus with stage 3 chronic kidney disease, with long-term current use of insulin (CMS-[facility])  Worsening, advised to restart lantus.   - COMP METABOLIC PANEL; Future  - HEMOGLOBIN A1C; Future    2. Pulmonary emphysema, unspecified emphysema type (CMS-[facility])  Stable. Continues on oxygen supplementation, advised to stop smoking. rx for chantix offered.     3. Chronic pain of right knee  Stable. Controlled advised to wean down opioid medication for chronic pain.   - oxyCODONE immediate release (ROXICODONE) 10 MG immediate release tablet; Take 1 Tab " by mouth 3 times a day as needed for Moderate Pain (continue to wean down) for up to 30 days.  Dispense: 110 Tab; Refill: 0    4. Chronic use of opiate drugs therapeutic purposes  - CONSENT FOR OPIATE PRESCRIPTION  - oxyCODONE immediate release (ROXICODONE) 10 MG immediate release tablet; Take 1 Tab by mouth 3 times a day as needed for Moderate Pain (continue to wean down) for up to 30 days.  Dispense: 110 Tab; Refill: 0    5. Anemia, unspecified type  - CBC WITH DIFFERENTIAL; Future    6. Obesity (BMI 30-39.9)  - Patient identified as having weight management issue.  Appropriate orders and counseling given.    7. Breast cancer screening  - MA-MAMMO SCREENING BILAT W/CHRIS W/CAD; Future      Followup: Return in about 4 weeks (around 3/14/2018) for knee injection and skin lesion removal, 3 month for pain med refills.

## 2018-02-27 ENCOUNTER — TELEPHONE (OUTPATIENT)
Dept: MEDICAL GROUP | Facility: PHYSICIAN GROUP | Age: 68
End: 2018-02-27

## 2018-02-27 RX ORDER — LEVOTHYROXINE SODIUM 0.07 MG/1
TABLET ORAL
Qty: 90 TAB | Refills: 1 | Status: SHIPPED | OUTPATIENT
Start: 2018-02-27 | End: 2018-08-27 | Stop reason: SDUPTHER

## 2018-02-27 NOTE — TELEPHONE ENCOUNTER
Was the patient seen in the last year in this department? Yes     Does patient have an active prescription for medications requested? No     Received Request Via: Pharmacy    Pt met protocol?: Yes     Last OV 02/2018  TSH   Date Value Ref Range Status   07/02/2017 1.350 0.300 - 3.700 uIU/mL Final

## 2018-02-27 NOTE — TELEPHONE ENCOUNTER
1. Caller Name: Anjelica ([facility])                      Call Back Number: [phone]    2. Message: [facility] pharmacy called stating that the patient is expecting a prescription for Chantix and they do not have.  I do not see this ordered in her chart.    3. Patient approves office to leave a detailed voicemail/MyChart message: N\A

## 2018-03-12 ENCOUNTER — OFFICE VISIT (OUTPATIENT)
Dept: MEDICAL GROUP | Facility: PHYSICIAN GROUP | Age: 68
End: 2018-03-12
Payer: MEDICARE

## 2018-03-12 VITALS
SYSTOLIC BLOOD PRESSURE: 108 MMHG | OXYGEN SATURATION: 98 % | TEMPERATURE: 98 F | HEART RATE: 78 BPM | WEIGHT: 207.8 LBS | BODY MASS INDEX: 34.62 KG/M2 | RESPIRATION RATE: 18 BRPM | HEIGHT: 65 IN | DIASTOLIC BLOOD PRESSURE: 64 MMHG

## 2018-03-12 DIAGNOSIS — Z79.4 TYPE 2 DIABETES MELLITUS WITH COMPLICATION, WITH LONG-TERM CURRENT USE OF INSULIN (HCC): Chronic | ICD-10-CM

## 2018-03-12 DIAGNOSIS — E11.8 TYPE 2 DIABETES MELLITUS WITH COMPLICATION, WITH LONG-TERM CURRENT USE OF INSULIN (HCC): Chronic | ICD-10-CM

## 2018-03-12 DIAGNOSIS — I10 CHRONIC HYPERTENSION: Chronic | ICD-10-CM

## 2018-03-12 DIAGNOSIS — L08.9 SKIN INFECTION: ICD-10-CM

## 2018-03-12 PROBLEM — A41.9 SEPTIC SHOCK (HCC): Status: RESOLVED | Noted: 2017-03-07 | Resolved: 2018-03-12

## 2018-03-12 PROBLEM — R65.21 SEPTIC SHOCK (HCC): Status: RESOLVED | Noted: 2017-03-07 | Resolved: 2018-03-12

## 2018-03-12 PROBLEM — A41.9 SEVERE SEPSIS (HCC): Status: RESOLVED | Noted: 2017-06-30 | Resolved: 2018-03-12

## 2018-03-12 PROBLEM — R65.20 SEVERE SEPSIS (HCC): Status: RESOLVED | Noted: 2017-06-30 | Resolved: 2018-03-12

## 2018-03-12 PROCEDURE — 99214 OFFICE O/P EST MOD 30 MIN: CPT | Performed by: FAMILY MEDICINE

## 2018-03-12 RX ORDER — SULFAMETHOXAZOLE AND TRIMETHOPRIM 800; 160 MG/1; MG/1
1 TABLET ORAL 2 TIMES DAILY
Qty: 10 TAB | Refills: 0 | Status: SHIPPED | OUTPATIENT
Start: 2018-03-12 | End: 2018-03-17

## 2018-03-13 ENCOUNTER — TELEPHONE (OUTPATIENT)
Dept: HEMATOLOGY ONCOLOGY | Facility: MEDICAL CENTER | Age: 68
End: 2018-03-13

## 2018-03-13 NOTE — ASSESSMENT & PLAN NOTE
Well controlled.   Chronic condition  Takes lisinopril 40 mg, hctz, amlodipine 10 mg  She has no chest pain but feels her heart pounding after walking  She states 108/64 is high for her.

## 2018-03-13 NOTE — PROGRESS NOTES
Subjective:   Priya Callahan is a 67 y.o. female here today for evaluation and management of:     Skin infection  Draining skin abscess x 2 on left upper thigh/inguinal region  Will tx with bactrim x 5 days.   Advised to keep clean and dry.     Diabetes mellitus, type II (CMS-[facility])  Last A1c was 7.0  Her blood sugar is elevated today at 200  She has some skin infection today will treat with bactrim.   She is feeling chronic fatigue.   She is on lantus 20 units at night but forgetting most days as she rearranged her room.   She is going to restart checking her blood sugars at night also.     Chronic hypertension  Well controlled.   Chronic condition  Takes lisinopril 40 mg, hctz, amlodipine 10 mg  She has no chest pain but feels her heart pounding after walking  She states 108/64 is high for her.      on cont oxygen 3-4 L    Current medicines (including changes today)  Current Outpatient Prescriptions   Medication Sig Dispense Refill   • sulfamethoxazole-trimethoprim (BACTRIM DS) 800-160 MG tablet Take 1 Tab by mouth 2 times a day for 5 days. 10 Tab 0   • levothyroxine (SYNTHROID) 75 MCG Tab TAKE 1 TABLET BY MOUTH DAILY 90 Tab 1   • varenicline (CHANTIX STARTING MONTH LUANN) 0.5 MG X 11 & 1 MG X 42 tablet Take 0.5 mg daily for 3 days, then twice a day for 4 days then 1 mg tablet twice a day 56 Tab 3   • Diclofenac Sodium 1 % Gel Apply  to skin as directed.     • [START ON 4/13/2018] oxyCODONE immediate release (ROXICODONE) 10 MG immediate release tablet Take 1 Tab by mouth 3 times a day as needed for Moderate Pain (continue to wean down) for up to 30 days. 110 Tab 0   • trazodone (DESYREL) 150 MG Tab TAKE 2 TABLETS BY MOUTH NIGHTLY AT BEDTIME 180 Tab 0   • lisinopril (PRINIVIL, ZESTRIL) 40 MG tablet Take 1 Tab by mouth every day. 90 Tab 0   • tiotropium (SPIRIVA) 18 MCG Cap Inhale 1 Cap by mouth every day. 30 Cap 3   • cyclobenzaprine (FLEXERIL) 10 MG Tab TAKE 1 TABLET BY MOUTH TWO TIMES DAILY AS NEEDED 30 Tab  "0   • lactulose 10 GM/15ML Solution Take 30 mL by mouth 2 times a day. 1 Bottle 3   • Cholecalciferol (VITAMIN D3) 1000 units Cap Take 1,000 Units by mouth every day.     • insulin glargine (LANTUS SOLOSTAR) 100 UNIT/ML Solution Pen-injector injection Inject 20 Units as instructed every evening.     • hydrochlorothiazide (HYDRODIURIL) 12.5 MG tablet Take 1 Tab by mouth every day. 30 Tab 3   • potassium chloride SA (K-DUR) 10 MEQ Tab CR Take 1 Tab by mouth every day. 30 Tab 3   • duloxetine (CYMBALTA) 60 MG Cap DR Particles delayed-release capsule Take 1 Cap by mouth every day. 90 Cap 1   • Biotin 5000 MCG Cap Take 5,000 mcg by mouth every bedtime.     • budesonide-formoterol (SYMBICORT) 160-4.5 MCG/ACT Aerosol Inhale 2 Puffs by mouth 2 Times a Day. 1 Inhaler 11   • amlodipine (NORVASC) 10 MG Tab Take 1 Tab by mouth every day. 90 Tab 1   • Cyanocobalamin (VITAMIN B-12) 1000 MCG Tab Take 1,000 mcg by mouth every evening.       No current facility-administered medications for this visit.      She  has a past medical history of Arthritis; ASTHMA; Backpain; Breath shortness; Bronchitis; Congestive heart failure (CMS-[facility]) (2013); COPD; Diabetes; Disorder of thyroid; Fall; Fibromyalgia; GERD (gastroesophageal reflux disease); Heart burn; Hepatitis C; Hypertension; Indigestion; Infectious disease; Neuropathy (CMS-HCC); On home oxygen therapy; Other specified symptom associated with female genital organs; Pain (9/13/2016); PND (post-nasal drip); Pneumonia; Psychiatric problem (9/13/2016); RLS (restless legs syndrome); Sleep apnea; and Unspecified urinary incontinence.    ROS  No chest pain, no shortness of breath, no abdominal pain       Objective:     Blood pressure 108/64, pulse 78, temperature 36.7 °C (98 °F), resp. rate 18, height 1.651 m (5' 5\"), weight 94.3 kg (207 lb 12.8 oz), SpO2 98 %. Body mass index is 34.58 kg/m².   Physical Exam:  Constitutional: Alert, no distress.  Skin: Warm, dry, good turgor, draining skin " lesions on right inner thigh. No pus, and with clear drainage. No erythema or swelling.   Eye: Equal, round and reactive, conjunctiva clear, lids normal.  ENMT: Lips without lesions, good dentition, oropharynx clear.  Neck: Trachea midline, no masses, no thyromegaly. No cervical or supraclavicular lymphadenopathy  Respiratory: Unlabored respiratory effort, lungs clear to auscultation, no wheezes, no ronchi.  Cardiovascular: Normal S1, S2, no murmur, no edema.  Abdomen: Soft, non-tender, no masses, no hepatosplenomegaly.  Psych: Alert and oriented x3, normal affect and mood.        Assessment and Plan:   The following treatment plan was discussed    1. Skin infection  - sulfamethoxazole-trimethoprim (BACTRIM DS) 800-160 MG tablet; Take 1 Tab by mouth 2 times a day for 5 days.  Dispense: 10 Tab; Refill: 0    2. Type 2 diabetes mellitus with complication, with long-term current use of insulin (CMS-[facility])  Advised to start using her lantus 20 units qhs    3. Chronic hypertension  Controlled.       Followup: Return for as scheduled. .

## 2018-03-13 NOTE — ASSESSMENT & PLAN NOTE
Last A1c was 7.0  Her blood sugar is elevated today at 200  She has some skin infection today will treat with bactrim.   She is feeling chronic fatigue.   She is on lantus 20 units at night but forgetting most days as she rearranged her room.   She is going to restart checking her blood sugars at night also.
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
## 2018-03-13 NOTE — ASSESSMENT & PLAN NOTE
Draining skin abscess x 2 on left upper thigh/inguinal region  Will tx with bactrim x 5 days.   Advised to keep clean and dry.

## 2018-03-16 RX ORDER — LISINOPRIL 40 MG/1
TABLET ORAL
Qty: 90 TAB | Refills: 0 | Status: SHIPPED | OUTPATIENT
Start: 2018-03-16 | End: 2018-08-29 | Stop reason: SDUPTHER

## 2018-03-16 RX ORDER — INSULIN GLARGINE 100 [IU]/ML
INJECTION, SOLUTION SUBCUTANEOUS
Qty: 15 ML | Refills: 0 | Status: SHIPPED | OUTPATIENT
Start: 2018-03-16 | End: 2018-10-08 | Stop reason: SDUPTHER

## 2018-03-20 ENCOUNTER — OFFICE VISIT (OUTPATIENT)
Dept: MEDICAL GROUP | Facility: PHYSICIAN GROUP | Age: 68
End: 2018-03-20
Payer: MEDICARE

## 2018-03-20 VITALS
BODY MASS INDEX: 34.99 KG/M2 | HEIGHT: 65 IN | RESPIRATION RATE: 16 BRPM | WEIGHT: 210 LBS | OXYGEN SATURATION: 100 % | HEART RATE: 75 BPM | TEMPERATURE: 97.7 F

## 2018-03-20 DIAGNOSIS — L08.9 SKIN INFECTION: ICD-10-CM

## 2018-03-20 DIAGNOSIS — J43.9 PULMONARY EMPHYSEMA, UNSPECIFIED EMPHYSEMA TYPE (HCC): Chronic | ICD-10-CM

## 2018-03-20 DIAGNOSIS — M25.561 CHRONIC PAIN OF RIGHT KNEE: ICD-10-CM

## 2018-03-20 DIAGNOSIS — G89.29 CHRONIC PAIN OF RIGHT KNEE: ICD-10-CM

## 2018-03-20 PROCEDURE — 20610 DRAIN/INJ JOINT/BURSA W/O US: CPT | Performed by: FAMILY MEDICINE

## 2018-03-20 PROCEDURE — 99214 OFFICE O/P EST MOD 30 MIN: CPT | Mod: 25 | Performed by: FAMILY MEDICINE

## 2018-03-20 RX ORDER — AZITHROMYCIN 250 MG/1
TABLET, FILM COATED ORAL
Qty: 6 TAB | Refills: 0 | Status: SHIPPED | OUTPATIENT
Start: 2018-03-20 | End: 2018-04-10

## 2018-03-20 RX ORDER — METHYLPREDNISOLONE 4 MG/1
TABLET ORAL
Qty: 21 TAB | Refills: 0 | Status: SHIPPED | OUTPATIENT
Start: 2018-03-20 | End: 2018-04-10

## 2018-03-21 ENCOUNTER — APPOINTMENT (OUTPATIENT)
Dept: RADIOLOGY | Facility: MEDICAL CENTER | Age: 68
End: 2018-03-21
Attending: FAMILY MEDICINE
Payer: MEDICARE

## 2018-03-21 NOTE — PROGRESS NOTES
Subjective:   Priya Callahan is a 67 y.o. female here today for evaluation and management of:     COPD (chronic obstructive pulmonary disease) (CMS-[facility])  She has cough productive with phlegm and small streaks of blood  Cough and tiredness for last 6 weeks. Worsening.   On continuous oxygen at 3-4L oxygen at 100% today.   Will treat with azithromycin and steroid  Get chest xray.     Chronic pain of right knee  Chronic pain of right knee due to OA  She had left knee total replacement done in the past  Steroid injection with 40 mg/cc 1cc kenalog with 3 cc of 1% lidocaine without epi done with anterior lateral approach after skin cleaned with alcohol.   Patient tolerated the procedure well.       Skin infection  Skin lesion improving on left upper thigh  No pus or tenderness or fluctuance  Has two shallow red areas almost like a blister without the top  S/p treatement with bactrim x 5 days.          Current medicines (including changes today)  Current Outpatient Prescriptions   Medication Sig Dispense Refill   • azithromycin (ZITHROMAX) 250 MG Tab Take two tablets by mouth day one then one tablet by mouth day 2-5 6 Tab 0   • MethylPREDNISolone (MEDROL DOSEPAK) 4 MG Tablet Therapy Pack As directed on the packaging label. 21 Tab 0   • lisinopril (PRINIVIL, ZESTRIL) 40 MG tablet TAKE 1 TABLET BY MOUTH DAILY 90 Tab 0   • LANTUS SOLOSTAR 100 UNIT/ML Solution Pen-injector injection INJECT 5-20 UNITS SUBCUTANEOUSLY EVERY EVENING 15 mL 0   • levothyroxine (SYNTHROID) 75 MCG Tab TAKE 1 TABLET BY MOUTH DAILY 90 Tab 1   • varenicline (CHANTIX STARTING MONTH PAK) 0.5 MG X 11 & 1 MG X 42 tablet Take 0.5 mg daily for 3 days, then twice a day for 4 days then 1 mg tablet twice a day 56 Tab 3   • Diclofenac Sodium 1 % Gel Apply  to skin as directed.     • [START ON 4/13/2018] oxyCODONE immediate release (ROXICODONE) 10 MG immediate release tablet Take 1 Tab by mouth 3 times a day as needed for Moderate Pain (continue to wean  "down) for up to 30 days. 110 Tab 0   • trazodone (DESYREL) 150 MG Tab TAKE 2 TABLETS BY MOUTH NIGHTLY AT BEDTIME 180 Tab 0   • tiotropium (SPIRIVA) 18 MCG Cap Inhale 1 Cap by mouth every day. 30 Cap 3   • cyclobenzaprine (FLEXERIL) 10 MG Tab TAKE 1 TABLET BY MOUTH TWO TIMES DAILY AS NEEDED 30 Tab 0   • lactulose 10 GM/15ML Solution Take 30 mL by mouth 2 times a day. 1 Bottle 3   • Cholecalciferol (VITAMIN D3) 1000 units Cap Take 1,000 Units by mouth every day.     • hydrochlorothiazide (HYDRODIURIL) 12.5 MG tablet Take 1 Tab by mouth every day. 30 Tab 3   • potassium chloride SA (K-DUR) 10 MEQ Tab CR Take 1 Tab by mouth every day. 30 Tab 3   • duloxetine (CYMBALTA) 60 MG Cap DR Particles delayed-release capsule Take 1 Cap by mouth every day. 90 Cap 1   • Biotin 5000 MCG Cap Take 5,000 mcg by mouth every bedtime.     • budesonide-formoterol (SYMBICORT) 160-4.5 MCG/ACT Aerosol Inhale 2 Puffs by mouth 2 Times a Day. 1 Inhaler 11   • amlodipine (NORVASC) 10 MG Tab Take 1 Tab by mouth every day. 90 Tab 1   • Cyanocobalamin (VITAMIN B-12) 1000 MCG Tab Take 1,000 mcg by mouth every evening.       No current facility-administered medications for this visit.      She  has a past medical history of Arthritis; ASTHMA; Backpain; Breath shortness; Bronchitis; Congestive heart failure (CMS-[facility]) (2013); COPD; Diabetes; Disorder of thyroid; Fall; Fibromyalgia; GERD (gastroesophageal reflux disease); Heart burn; Hepatitis C; Hypertension; Indigestion; Infectious disease; Neuropathy (CMS-HCC); On home oxygen therapy; Other specified symptom associated with female genital organs; Pain (9/13/2016); PND (post-nasal drip); Pneumonia; Psychiatric problem (9/13/2016); RLS (restless legs syndrome); Sleep apnea; and Unspecified urinary incontinence.    ROS  No chest pain, no shortness of breath, no abdominal pain       Objective:     Pulse 75, temperature 36.5 °C (97.7 °F), resp. rate 16, height 1.651 m (5' 5\"), weight 95.3 kg (210 lb), SpO2 " 100 %. Body mass index is 34.95 kg/m².   Physical Exam:  Constitutional: Alert, no distress.  Skin: Warm, dry, good turgor. Two shallow areas of skin breakdown on left upper thigh. No erythema, no pus.   Eye: Equal, round and reactive, conjunctiva clear, lids normal.  ENMT: Lips without lesions, good dentition, oropharynx clear.  Neck: Trachea midline, no masses, no thyromegaly. No cervical or supraclavicular lymphadenopathy  Respiratory: Unlabored respiratory effort, lungs clear to auscultation, no wheezes, no ronchi.  Cardiovascular: Normal S1, S2, no murmur, no edema.  Abdomen: Soft, non-tender, no masses, no hepatosplenomegaly.  Psych: Alert and oriented x3, normal affect and mood.  Right knee: TTP mild over lateral joint line. Negative drawer test, no erythema or swelling.           Assessment and Plan:   The following treatment plan was discussed    1. Pulmonary emphysema, unspecified emphysema type (CMS-HCC)  Chronic condition with acute exacerbation.  - DX-CHEST-2 VIEWS; Future  - azithromycin (ZITHROMAX) 250 MG Tab; Take two tablets by mouth day one then one tablet by mouth day 2-5  Dispense: 6 Tab; Refill: 0    2. Chronic pain of right knee  Knee injection done today.     3. Skin infection  Improving. Advised to keep area clean and dry and monitor.       Followup: Return in about 3 months (around 6/20/2018) for copd, knee pain, skin lesion.

## 2018-03-21 NOTE — ASSESSMENT & PLAN NOTE
Skin lesion improving on left upper thigh  No pus or tenderness or fluctuance  Has two shallow red areas almost like a blister without the top  S/p treatement with bactrim x 5 days.

## 2018-03-21 NOTE — ASSESSMENT & PLAN NOTE
Chronic pain of right knee due to OA  She had left knee total replacement done in the past  Steroid injection with 40 mg/cc 1cc kenalog with 3 cc of 1% lidocaine without epi done with anterior lateral approach after skin cleaned with alcohol.   Patient tolerated the procedure well.

## 2018-03-21 NOTE — ASSESSMENT & PLAN NOTE
She has cough productive with phlegm and small streaks of blood  Cough and tiredness for last 6 weeks. Worsening.   On continuous oxygen at 3-4L oxygen at 100% today.   Will treat with azithromycin and steroid  Get chest xray.

## 2018-03-26 RX ORDER — TRIAMCINOLONE ACETONIDE 40 MG/ML
40 INJECTION, SUSPENSION INTRA-ARTICULAR; INTRAMUSCULAR ONCE
OUTPATIENT
Start: 2018-03-26 | End: 2018-03-27

## 2018-03-29 RX ORDER — TIOTROPIUM BROMIDE 18 UG/1
CAPSULE ORAL; RESPIRATORY (INHALATION)
Qty: 90 CAP | Refills: 0 | Status: SHIPPED | OUTPATIENT
Start: 2018-03-29 | End: 2018-07-05

## 2018-03-29 NOTE — TELEPHONE ENCOUNTER
Was the patient seen in the last year in this department? Yes     Does patient have an active prescription for medications requested? No     Received Request Via: Pharmacy    Pt met protocol?: Yes     Last OV 03/2018

## 2018-04-04 NOTE — PROGRESS NOTES
Priya Callahan is a 66 y.o. female here for a non-provider visit for: Lab Draws  on 8/15/2017 at 3:48 PM    Procedure Performed: Venipuncture     Anatomical site: Right Antecubital Area (AC) and Left Antecubital Area (AC)    Equipment used: 21g    Labs drawn: CBC w/diff, LDH and recticuloctes count, spep, westergren, iron, ferritin    Ordering Provider: Dr. Brian Olea    Denny By: Fermin Sheets Ass't   Missed the first time, had amie Johns come in and draw no complications the second stick.      pt seen at bedside in NAD. dressing to left foot c/d/i s/p bypass LLE  plantar foot wound noted slightly less moist plantar toes wounds dry and stable well adhered.  toe 3-5 eschars peeled off with red healthy tissue underneath  foot is slowly improving with collagenase and Dakins  spoke with family they are leaning toward conservative treatment at this time, they are aware that  he still may require  debridement or even a TMA in the future especially if it gets infected  I would recommend course of abx as outpt for 10 days   pt can follow up in wound care center  and perhaps try HBO as well

## 2018-04-10 ENCOUNTER — OFFICE VISIT (OUTPATIENT)
Dept: MEDICAL GROUP | Facility: PHYSICIAN GROUP | Age: 68
End: 2018-04-10
Payer: MEDICARE

## 2018-04-10 VITALS
HEIGHT: 65 IN | HEART RATE: 80 BPM | OXYGEN SATURATION: 96 % | TEMPERATURE: 97.5 F | SYSTOLIC BLOOD PRESSURE: 100 MMHG | BODY MASS INDEX: 35.52 KG/M2 | RESPIRATION RATE: 16 BRPM | WEIGHT: 213.2 LBS | DIASTOLIC BLOOD PRESSURE: 60 MMHG

## 2018-04-10 DIAGNOSIS — R05.9 COUGH: ICD-10-CM

## 2018-04-10 DIAGNOSIS — L98.491 SKIN ULCER, LIMITED TO BREAKDOWN OF SKIN (HCC): ICD-10-CM

## 2018-04-10 PROBLEM — J18.9 CAP (COMMUNITY ACQUIRED PNEUMONIA): Status: RESOLVED | Noted: 2017-04-26 | Resolved: 2018-04-10

## 2018-04-10 PROBLEM — N18.9 ACUTE ON CHRONIC RENAL FAILURE (HCC): Status: RESOLVED | Noted: 2017-03-08 | Resolved: 2018-04-10

## 2018-04-10 PROBLEM — J96.20 ACUTE ON CHRONIC RESPIRATORY FAILURE (HCC): Status: RESOLVED | Noted: 2017-03-08 | Resolved: 2018-04-10

## 2018-04-10 PROBLEM — R79.89 ELEVATED TROPONIN: Status: RESOLVED | Noted: 2017-04-25 | Resolved: 2018-04-10

## 2018-04-10 PROBLEM — A41.9 SEVERE SEPSIS WITH SEPTIC SHOCK (HCC): Status: RESOLVED | Noted: 2017-03-08 | Resolved: 2018-04-10

## 2018-04-10 PROBLEM — N17.9 ACUTE ON CHRONIC RENAL FAILURE (HCC): Status: RESOLVED | Noted: 2017-03-08 | Resolved: 2018-04-10

## 2018-04-10 PROBLEM — A41.9 SEPSIS (HCC): Status: RESOLVED | Noted: 2017-04-25 | Resolved: 2018-04-10

## 2018-04-10 PROBLEM — R65.21 SEVERE SEPSIS WITH SEPTIC SHOCK (HCC): Status: RESOLVED | Noted: 2017-03-08 | Resolved: 2018-04-10

## 2018-04-10 PROCEDURE — 99214 OFFICE O/P EST MOD 30 MIN: CPT | Performed by: FAMILY MEDICINE

## 2018-04-10 RX ORDER — DULOXETIN HYDROCHLORIDE 60 MG/1
60 CAPSULE, DELAYED RELEASE ORAL DAILY
Qty: 90 CAP | Refills: 3 | Status: SHIPPED | OUTPATIENT
Start: 2018-04-10 | End: 2019-01-11 | Stop reason: SDUPTHER

## 2018-04-10 RX ORDER — FAMOTIDINE 20 MG/1
20 TABLET, FILM COATED ORAL 2 TIMES DAILY
Qty: 180 TAB | Refills: 3 | Status: SHIPPED | OUTPATIENT
Start: 2018-04-10 | End: 2018-07-05

## 2018-04-10 RX ORDER — BENZONATATE 100 MG/1
100 CAPSULE ORAL 3 TIMES DAILY PRN
Qty: 60 CAP | Refills: 0 | Status: SHIPPED | OUTPATIENT
Start: 2018-04-10 | End: 2019-01-31

## 2018-04-11 NOTE — ASSESSMENT & PLAN NOTE
Improved after zpack still with productive cough with phlegm  Advised on mucinex with whole glass of water.   Tessalon perles for cough

## 2018-04-11 NOTE — ASSESSMENT & PLAN NOTE
Chronic ulcer on right upper thigh present for a few months not resolving  Shallow wet with some yellow granulation  Will refer to home health wound care for treatment  Advised on cleaning with soap, water, wet to dry dressing with gauze and saline solution provided for patient.

## 2018-04-11 NOTE — PROGRESS NOTES
Subjective:   Priya Callahan is a 67 y.o. female here today for evaluation and management of:     Skin ulcer, limited to breakdown of skin (CMS-HCC)  Chronic ulcer on right upper thigh present for a few months not resolving  Shallow wet with some yellow granulation  Will refer to home health wound care for treatment  Advised on cleaning with soap, water, wet to dry dressing with gauze and saline solution provided for patient.     Cough  Improved after zpack still with productive cough with phlegm  Advised on mucinex with whole glass of water.   Tessalon perles for cough           Current medicines (including changes today)  Current Outpatient Prescriptions   Medication Sig Dispense Refill   • DULoxetine (CYMBALTA) 60 MG Cap DR Particles delayed-release capsule Take 1 Cap by mouth every day. 90 Cap 3   • famotidine (PEPCID) 20 MG Tab Take 1 Tab by mouth 2 times a day. 180 Tab 3   • benzonatate (TESSALON) 100 MG Cap Take 1 Cap by mouth 3 times a day as needed for Cough. 60 Cap 0   • SPIRIVA HANDIHALER 18 MCG Cap INHALE 1 CAPSULE VIA HANDIHALER BY MOUTH DAILY 90 Cap 0   • lisinopril (PRINIVIL, ZESTRIL) 40 MG tablet TAKE 1 TABLET BY MOUTH DAILY 90 Tab 0   • LANTUS SOLOSTAR 100 UNIT/ML Solution Pen-injector injection INJECT 5-20 UNITS SUBCUTANEOUSLY EVERY EVENING 15 mL 0   • levothyroxine (SYNTHROID) 75 MCG Tab TAKE 1 TABLET BY MOUTH DAILY 90 Tab 1   • Diclofenac Sodium 1 % Gel Apply  to skin as directed.     • [START ON 4/13/2018] oxyCODONE immediate release (ROXICODONE) 10 MG immediate release tablet Take 1 Tab by mouth 3 times a day as needed for Moderate Pain (continue to wean down) for up to 30 days. 110 Tab 0   • trazodone (DESYREL) 150 MG Tab TAKE 2 TABLETS BY MOUTH NIGHTLY AT BEDTIME 180 Tab 0   • cyclobenzaprine (FLEXERIL) 10 MG Tab TAKE 1 TABLET BY MOUTH TWO TIMES DAILY AS NEEDED 30 Tab 0   • lactulose 10 GM/15ML Solution Take 30 mL by mouth 2 times a day. 1 Bottle 3   • hydrochlorothiazide  "(HYDRODIURIL) 12.5 MG tablet Take 1 Tab by mouth every day. 30 Tab 3   • potassium chloride SA (K-DUR) 10 MEQ Tab CR Take 1 Tab by mouth every day. 30 Tab 3   • budesonide-formoterol (SYMBICORT) 160-4.5 MCG/ACT Aerosol Inhale 2 Puffs by mouth 2 Times a Day. 1 Inhaler 11   • amlodipine (NORVASC) 10 MG Tab Take 1 Tab by mouth every day. 90 Tab 1     No current facility-administered medications for this visit.      She  has a past medical history of Arthritis; ASTHMA; Backpain; Breath shortness; Bronchitis; Congestive heart failure (CMS-HCC) (2013); COPD; Diabetes; Disorder of thyroid; Fall; Fibromyalgia; GERD (gastroesophageal reflux disease); Heart burn; Hepatitis C; Hypertension; Indigestion; Infectious disease; Neuropathy (CMS-HCC); On home oxygen therapy; Other specified symptom associated with female genital organs; Pain (9/13/2016); PND (post-nasal drip); Pneumonia; Psychiatric problem (9/13/2016); RLS (restless legs syndrome); Sleep apnea; and Unspecified urinary incontinence.    ROS  No chest pain, no shortness of breath, no abdominal pain       Objective:     Blood pressure 100/60, pulse 80, temperature 36.4 °C (97.5 °F), resp. rate 16, height 1.651 m (5' 5\"), weight 96.7 kg (213 lb 3.2 oz), SpO2 96 %. Body mass index is 35.48 kg/m².   Physical Exam:  Constitutional: Alert, no distress.  Skin: Warm, dry, good turgor,right upper thigh with two shallow ulcerations wet in appearance with some yellow exudate.   Eye: Equal, round and reactive, conjunctiva clear, lids normal.  ENMT: Lips without lesions, good dentition, oropharynx clear.  Neck: Trachea midline, no masses, no thyromegaly. No cervical or supraclavicular lymphadenopathy  Respiratory: Unlabored respiratory effort, lungs clear to auscultation, no wheezes, no ronchi.  Cardiovascular: Normal S1, S2, no murmur, no edema.  Abdomen: Soft, non-tender, no masses, no hepatosplenomegaly.  Psych: Alert and oriented x3, normal affect and mood.        Assessment " and Plan:   The following treatment plan was discussed    1. Skin ulcer, limited to breakdown of skin (CMS-[facility])  Referral to wound care. Advised on wet to dry dressing.   - REFERRAL TO HOME HEALTH    2. Cough  Improving.       Followup: Return in about 3 months (around 7/10/2018) for copd, skin ulcer, cough, ckd.

## 2018-04-16 ENCOUNTER — TELEPHONE (OUTPATIENT)
Dept: MEDICAL GROUP | Facility: PHYSICIAN GROUP | Age: 68
End: 2018-04-16

## 2018-04-16 NOTE — TELEPHONE ENCOUNTER
Lydia Freeman with [employer] [phone] requesting verbal orders for additional resources of Home Health Aid to evaluate Priya.  Verbal Orders given

## 2018-04-17 ENCOUNTER — TELEPHONE (OUTPATIENT)
Dept: HEMATOLOGY ONCOLOGY | Facility: MEDICAL CENTER | Age: 68
End: 2018-04-17
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
## 2018-04-20 ENCOUNTER — APPOINTMENT (OUTPATIENT)
Dept: HEMATOLOGY ONCOLOGY | Facility: MEDICAL CENTER | Age: 68
End: 2018-04-20
Payer: MEDICARE

## 2018-05-10 ENCOUNTER — PATIENT OUTREACH (OUTPATIENT)
Dept: HEALTH INFORMATION MANAGEMENT | Facility: OTHER | Age: 68
End: 2018-05-10

## 2018-05-10 NOTE — PROGRESS NOTES
1. Attempt #: 1    2. [facility] Verified: yes    3. Verify PCP: yes    4. Care Team Updated:       •   DME Company (gait device, O2, CPAP, etc.): YES       •   Other Specialists (eye doctor, derm, GYN, cardiology, endo, etc): YES    5.  Reviewed/Updated the following with patient:       •   Communication Preference Obtained? YES       •   Preferred Pharmacy? YES       •   Preferred Lab? YES       •   Family History (document living status of immediate family members and if + hx of cancer, diabetes, hypertension, hyperlipidemia, heart attack, stroke) YES. Was Abstract Encounter opened and chart updated? YES    6. [employer] [facility]: declined    7. [employer] [facility]: no    8. Annual Wellness Visit Scheduling  Scheduling Status:Scheduled      9. Care Gap Scheduling (Attempt to Schedule EACH Overdue Care Gap!)     Health Maintenance Due   Topic Date Due   • RETINAL SCREENING  09[birth year]26/1967   • PFT SCREENING-FEV1 AND FEV/FVC RATIO / SPIROMETRY SHOULD BE PERFORMED ANNUALLY  09/26/1967   • IMM DTaP/Tdap/Td Vaccine (1 - Tdap) 09/26/1968   • MAMMOGRAM  02/17/2016   • URINE DRUG SCREEN  06/02/2017   • DIABETES MONOFILAMENT / LE EXAM  06/07/2017   • Annual Wellness Visit  09/28/2017   • A1C SCREENING  03/25/2018   • COLON CANCER SCREENING ANNUAL FIT  04/03/2018        Scheduled patient for Annual Wellness Visit    10. Patient was advised: “This is a free wellness visit. The provider will screen for medical conditions to help you stay healthy. If you have other concerns to address you may be asked to discuss these at a separate visit or there may be an additional fee.”     11. Patient was informed to arrive 15 min prior to their scheduled appointment and bring in their medication bottles.

## 2018-05-10 NOTE — PROGRESS NOTES
Outcome: Left Message    Please transfer to Patient Outreach Team at [phone] when patient returns call.      Attempt # 1

## 2018-05-15 RX ORDER — LEVOTHYROXINE SODIUM 0.07 MG/1
TABLET ORAL
Refills: 1 | OUTPATIENT
Start: 2018-05-15

## 2018-05-16 ENCOUNTER — OFFICE VISIT (OUTPATIENT)
Dept: HEMATOLOGY ONCOLOGY | Facility: MEDICAL CENTER | Age: 68
End: 2018-05-16
Payer: MEDICARE

## 2018-05-16 ENCOUNTER — TELEPHONE (OUTPATIENT)
Dept: HEMATOLOGY ONCOLOGY | Facility: MEDICAL CENTER | Age: 68
End: 2018-05-16

## 2018-05-16 ENCOUNTER — NON-PROVIDER VISIT (OUTPATIENT)
Dept: HEMATOLOGY ONCOLOGY | Facility: MEDICAL CENTER | Age: 68
End: 2018-05-16
Payer: MEDICARE

## 2018-05-16 ENCOUNTER — HOSPITAL ENCOUNTER (OUTPATIENT)
Facility: MEDICAL CENTER | Age: 68
End: 2018-05-16
Attending: INTERNAL MEDICINE
Payer: MEDICARE

## 2018-05-16 VITALS
BODY MASS INDEX: 34.66 KG/M2 | HEIGHT: 65 IN | TEMPERATURE: 97.8 F | OXYGEN SATURATION: 98 % | RESPIRATION RATE: 18 BRPM | SYSTOLIC BLOOD PRESSURE: 118 MMHG | WEIGHT: 208 LBS | HEART RATE: 77 BPM | DIASTOLIC BLOOD PRESSURE: 76 MMHG

## 2018-05-16 VITALS
TEMPERATURE: 97.8 F | BODY MASS INDEX: 34.82 KG/M2 | DIASTOLIC BLOOD PRESSURE: 76 MMHG | HEIGHT: 65 IN | RESPIRATION RATE: 18 BRPM | OXYGEN SATURATION: 98 % | WEIGHT: 209 LBS | HEART RATE: 77 BPM | SYSTOLIC BLOOD PRESSURE: 118 MMHG

## 2018-05-16 DIAGNOSIS — D63.8 ANEMIA IN OTHER CHRONIC DISEASES CLASSIFIED ELSEWHERE: ICD-10-CM

## 2018-05-16 DIAGNOSIS — D64.9 ANEMIA REQUIRING TRANSFUSIONS: ICD-10-CM

## 2018-05-16 DIAGNOSIS — D63.8 ANEMIA OF CHRONIC DISEASE: Chronic | ICD-10-CM

## 2018-05-16 DIAGNOSIS — E87.5 HYPERKALEMIA: ICD-10-CM

## 2018-05-16 LAB
ALBUMIN SERPL BCP-MCNC: 3.7 G/DL (ref 3.2–4.9)
ALBUMIN/GLOB SERPL: 1.3 G/DL
ALP SERPL-CCNC: 46 U/L (ref 30–99)
ALT SERPL-CCNC: 17 U/L (ref 2–50)
ANION GAP SERPL CALC-SCNC: 8 MMOL/L (ref 0–11.9)
AST SERPL-CCNC: 15 U/L (ref 12–45)
BASOPHILS # BLD AUTO: 0.5 % (ref 0–1.8)
BASOPHILS # BLD: 0.05 K/UL (ref 0–0.12)
BILIRUB SERPL-MCNC: 0.2 MG/DL (ref 0.1–1.5)
BUN SERPL-MCNC: 42 MG/DL (ref 8–22)
CALCIUM SERPL-MCNC: 9.3 MG/DL (ref 8.5–10.5)
CHLORIDE SERPL-SCNC: 108 MMOL/L (ref 96–112)
CO2 SERPL-SCNC: 23 MMOL/L (ref 20–33)
CREAT SERPL-MCNC: 1.7 MG/DL (ref 0.5–1.4)
EOSINOPHIL # BLD AUTO: 0.23 K/UL (ref 0–0.51)
EOSINOPHIL NFR BLD: 2.1 % (ref 0–6.9)
ERYTHROCYTE [DISTWIDTH] IN BLOOD BY AUTOMATED COUNT: 46.8 FL (ref 35.9–50)
FERRITIN SERPL-MCNC: 378.7 NG/ML (ref 10–291)
GLOBULIN SER CALC-MCNC: 2.8 G/DL (ref 1.9–3.5)
GLUCOSE SERPL-MCNC: 132 MG/DL (ref 65–99)
HCT VFR BLD AUTO: 33.2 % (ref 37–47)
HGB BLD-MCNC: 10.7 G/DL (ref 12–16)
IMM GRANULOCYTES # BLD AUTO: 0.09 K/UL (ref 0–0.11)
IMM GRANULOCYTES NFR BLD AUTO: 0.8 % (ref 0–0.9)
IRON SATN MFR SERPL: 18 % (ref 15–55)
IRON SERPL-MCNC: 48 UG/DL (ref 40–170)
LYMPHOCYTES # BLD AUTO: 1.91 K/UL (ref 1–4.8)
LYMPHOCYTES NFR BLD: 17.3 % (ref 22–41)
MCH RBC QN AUTO: 32.3 PG (ref 27–33)
MCHC RBC AUTO-ENTMCNC: 32.2 G/DL (ref 33.6–35)
MCV RBC AUTO: 100.3 FL (ref 81.4–97.8)
MONOCYTES # BLD AUTO: 0.87 K/UL (ref 0–0.85)
MONOCYTES NFR BLD AUTO: 7.9 % (ref 0–13.4)
NEUTROPHILS # BLD AUTO: 7.91 K/UL (ref 2–7.15)
NEUTROPHILS NFR BLD: 71.4 % (ref 44–72)
NRBC # BLD AUTO: 0 K/UL
NRBC BLD-RTO: 0 /100 WBC
PLATELET # BLD AUTO: 284 K/UL (ref 164–446)
PMV BLD AUTO: 9.4 FL (ref 9–12.9)
POTASSIUM SERPL-SCNC: 5.8 MMOL/L (ref 3.6–5.5)
PROT SERPL-MCNC: 6.5 G/DL (ref 6–8.2)
RBC # BLD AUTO: 3.31 M/UL (ref 4.2–5.4)
RHEUMATOID FACT SER IA-ACNC: <10 IU/ML (ref 0–14)
SODIUM SERPL-SCNC: 139 MMOL/L (ref 135–145)
TIBC SERPL-MCNC: 265 UG/DL (ref 250–450)
VIT B12 SERPL-MCNC: 857 PG/ML (ref 211–911)
WBC # BLD AUTO: 11.1 K/UL (ref 4.8–10.8)

## 2018-05-16 PROCEDURE — 86235 NUCLEAR ANTIGEN ANTIBODY: CPT | Mod: 91

## 2018-05-16 PROCEDURE — 82607 VITAMIN B-12: CPT

## 2018-05-16 PROCEDURE — 83550 IRON BINDING TEST: CPT

## 2018-05-16 PROCEDURE — 99214 OFFICE O/P EST MOD 30 MIN: CPT | Performed by: INTERNAL MEDICINE

## 2018-05-16 PROCEDURE — 86431 RHEUMATOID FACTOR QUANT: CPT

## 2018-05-16 PROCEDURE — 86038 ANTINUCLEAR ANTIBODIES: CPT

## 2018-05-16 PROCEDURE — 80053 COMPREHEN METABOLIC PANEL: CPT

## 2018-05-16 PROCEDURE — 36415 COLL VENOUS BLD VENIPUNCTURE: CPT | Performed by: INTERNAL MEDICINE

## 2018-05-16 PROCEDURE — 86225 DNA ANTIBODY NATIVE: CPT

## 2018-05-16 PROCEDURE — 85025 COMPLETE CBC W/AUTO DIFF WBC: CPT

## 2018-05-16 PROCEDURE — 82728 ASSAY OF FERRITIN: CPT

## 2018-05-16 PROCEDURE — 83540 ASSAY OF IRON: CPT

## 2018-05-16 ASSESSMENT — PAIN SCALES - GENERAL
PAINLEVEL: 7=MODERATE-SEVERE PAIN
PAINLEVEL: 7=MODERATE-SEVERE PAIN

## 2018-05-16 NOTE — LETTER
"     [facility]   [address] [place] [address], [address]  [place] [address]  Phone: [phone]  Fax: [phone]              Priya Callahan  1950    Encounter Date: 5/16/2018    Brian Olea M.D.          PROGRESS NOTE:  Date of visit: 5/16/2018  11:23 AM      Chief Complaint- Anemia.       Identification/Prior relevant history:   significant history of COPD who had a prolonged hospital admission back in July 2017. She presented with respiratory failure requiring ICU stay. CT chest showed no pulmonary embolism. She had bilateral lung opacities with some mild mediastinal and hilar adenopathy. She had significant anemia during the hospitalization and received around 3-4 units of packed red blood cells. She had gradual recovery. Review of prior CBCs have shown baseline hemoglobin around 10-12 g/dL with significant worsening of anemia during hospitalizations.    Interim history- I saw her back in August 2017. Rest of the anemia workup was unremarkable with no significant, and deficiency, normal LDH levels. She did have significant elevation of ESR. Myeloma workup was negative. Her hemoglobin has subsequently improved. She has significant arthralgias    Past Medical History:      Past Medical History:   Diagnosis Date   • Arthritis     \"everywhere\"   • ASTHMA    • Backpain     chronic back pain   • Breath shortness     \"only with flare up with allergies\"   • Bronchitis     as a child   • Congestive heart failure (HCC) 2013    related to pulmonary failure   • COPD    • Diabetes     Type 2   • Disorder of thyroid     Hypothyroid   • Fall    • Fibromyalgia    • GERD (gastroesophageal reflux disease)    • Heart burn    • Hepatitis C     \"I have markers in blood but not active\"   • Hypertension    • Indigestion    • Infectious disease     Hepatitis C   • Neuropathy (HCC)     bilat feet   • On home oxygen therapy    • Other specified symptom associated with female genital organs     Hysterectomy at age " "22yrs   • Pain 9/13/2016    \"pain everywhere\"   • PND (post-nasal drip)    • Pneumonia     as a child   • Psychiatric problem 9/13/2016    PTSD   • RLS (restless legs syndrome)    • Sleep apnea     does not wear CPAP, \"can't do it\"   • Unspecified urinary incontinence        Past surgical history:       Past Surgical History:   Procedure Laterality Date   • ANKLE ORIF Left 5/8/2017    Procedure: ANKLE ORIF;  Surgeon: Rico Gtz M.D.;  Location: [facility];  Service:    • OH DSTR NROLYTC AGNT PARVERTEB FCT SNGL LMBR/SACRAL Left 9/16/2016    Procedure: NEURO DEST FACET L/S W/IG SNGL - L3-S1;  Surgeon: Xavier Arteaga D.O.;  Location: [facility];  Service: Pain Management   • OH DSTR NROLYTC AGNT PARVERTEB FCT ADDL LMBR/SACRAL  9/16/2016    Procedure: NEURO DEST FACET L/S W/IG ADDL;  Surgeon: Xavier Arteaga D.O.;  Location: [facility];  Service: Pain Management   • OH DSTR NROLYTC AGNT PARVERTEB FCT ADDL LMBR/SACRAL  9/16/2016    Procedure: NEURO DEST FACET L/S W/IG ADDL;  Surgeon: Xavier Arteaga D.O.;  Location: [facility];  Service: Pain Management   • PB FLUOROSCOPIC GUIDANCE NEEDLE PLACEMENT  9/16/2016    Procedure: FLOURO GUIDE NEEDLE PLACEMENT;  Surgeon: Xavier Arteaga D.O.;  Location: SURG[facility];  Service: Pain Management   • OH DSTR NROLYTC AGNT PARVERTEB FCT SNGL LMBR/SACRAL Right 11/6/2015    Procedure: NEURO DEST FACET L/S W/IG SNGL - L3-S1;  Surgeon: Xavier Arteaga D.O.;  Location: SURGERY [facility];  Service: Pain Management   • OH DSTR NROLYTC AGNT PARVERTEB FCT ADDL LMBR/SACRAL  11/6/2015    Procedure: NEURO DEST FACET L/S W/IG ADDL;  Surgeon: Xavier Arteaga D.O.;  Location: SURGERY [facility];  Service: Pain Management   • PB INJECT RX OTHER PERIPH NERVE  11/6/2015    Procedure: NEUROLYTIC DEST-OTHER NERVE;  Surgeon: Xavier Arteaga D.O.;  [facility]: [facility];  " Service: Pain Management   • NE DSTR NROLYTC AGNT PARVERTEB FCT ADDL LMBR/SACRAL  11/6/2015    Procedure: NEURO DEST FACET L/S W/IG ADDL;  Surgeon: Xavier Arteaga D.O.;  Location: [facility];  Service: Pain Management   • NE DSTR NROLYTC AGNT PARVERTEB FCT SNGL LMBR/SACRAL Left 10/2/2015    Procedure: NEURO DEST FACET L/S W/IG SNGL - L3-S1;  Surgeon: Xavier Arteaga D.O.;  Location: [facility];  Service: Pain Management   • NE DSTR NROLYTC AGNT PARVERTEB FCT ADDL LMBR/SACRAL  10/2/2015    Procedure: NEURO DEST FACET L/S W/IG ADDL;  Surgeon: Xavier Arteaga D.O.;  Location: [facility];  Service: Pain Management   • PB INJECT RX OTHER PERIPH NERVE  10/2/2015    Procedure: NEUROLYTIC DEST-OTHER NERVE;  Surgeon: Xavier Arteaga D.O.;  Location: [facility];  Service: Pain Management   • NE DSTR NROLYTC AGNT PARVERTEB FCT ADDL LMBR/SACRAL  10/2/2015    Procedure: NEURO DEST FACET L/S W/IG ADDL;  Surgeon: Xavier Arteaga D.O.;  Location: [facility];  Service: Pain Management   • PB INJ DX/THER AGNT PARAVERT FACET JOINT, OBED* Left 7/17/2015    Procedure: INJ PARA FACET L/S 1 LVL W/IG - L3-S1;  Surgeon: Xavier Arteaga D.O.;  Location: [facility];  Service: Pain Management   • PB INJ DX/THER AGNT PARAVERT FACET JOINT, OBED*  7/17/2015    Procedure: INJ PARA FACET L/S 2D LVL W/IG;  Surgeon: Xavier Arteaga D.O.;  Location: [facility];  Service: Pain Management   • PB INJ DX/THER AGNT PARAVERT FACET JOINT, OBED*  7/17/2015    Procedure: INJ PARA FACET L/S 3D LVL W/IG;  Surgeon: Xavier Arteaga D.O.;  Location: SURGERY [facility];  Service: Pain Management   • PB INJECT NERV BLCK,OTHR PERIPH NERV  7/17/2015    Procedure: INJ-ANES AGENT-OTHER PHER.NRVE;  Surgeon: Xavier Arteaga D.O.;  [facility]: [facility];  Service: Pain Management   • PB INJ DX/THER AGNT PARAVERT FACET JOINT, OBED*  Left 7/10/2015    Procedure: INJ PARA FACET L/S 1 LVL W/IG - L3-S1;  Surgeon: Xavier Arteaga D.O.;  [facility];  Service: Pain Management   • PB INJ DX/THER AGNT PARAVERT FACET JOINT, OBED*  7/10/2015    Procedure: INJ PARA FACET L/S 2D LVL W/IG;  Surgeon: Xavier Arteaga D.O.;  [facility];  Service: Pain Management   • PB INJ DX/THER AGNT PARAVERT FACET JOINT, OBED*  7/10/2015    Procedure: INJ PARA FACET L/S 3D LVL W/IG;  Surgeon: Xavier Arteaga D.O.;  [facility];  Service: Pain Management   • PB INJECT NERV BLCK,OTHR PERIPH NERV  7/10/2015    Procedure: INJ-ANES AGENT-OTHER PHER.NRVE;  Surgeon: Xavier Arteaga D.O.;  Location: [facility];  Service: Pain Management   • GYN SURGERY      hysterectomy    • LUMPECTOMY Left     Left breast   • OTHER ORTHOPEDIC SURGERY      L knee replacement    • OTHER ORTHOPEDIC SURGERY      R carpal tunnel    • US-NEEDLE CORE BX-BREAST PANEL         Allergies:       Vitamin c; Keflex; Codeine; Gabapentin; Lyrica; and Sudafed    Medications:         Current Outpatient Prescriptions   Medication Sig Dispense Refill   • DULoxetine (CYMBALTA) 60 MG Cap DR Particles delayed-release capsule Take 1 Cap by mouth every day. 90 Cap 3   • benzonatate (TESSALON) 100 MG Cap Take 1 Cap by mouth 3 times a day as needed for Cough. 60 Cap 0   • SPIRIVA HANDIHALER 18 MCG Cap INHALE 1 CAPSULE VIA HANDIHALER BY MOUTH DAILY 90 Cap 0   • lisinopril (PRINIVIL, ZESTRIL) 40 MG tablet TAKE 1 TABLET BY MOUTH DAILY 90 Tab 0   • LANTUS SOLOSTAR 100 UNIT/ML Solution Pen-injector injection INJECT 5-20 UNITS SUBCUTANEOUSLY EVERY EVENING 15 mL 0   • levothyroxine (SYNTHROID) 75 MCG Tab TAKE 1 TABLET BY MOUTH DAILY 90 Tab 1   • Diclofenac Sodium 1 % Gel Apply  to skin as directed.     • trazodone (DESYREL) 150 MG Tab TAKE 2 TABLETS BY MOUTH NIGHTLY AT BEDTIME 180 Tab 0   • cyclobenzaprine (FLEXERIL) 10 MG Tab TAKE 1  TABLET BY MOUTH TWO TIMES DAILY AS NEEDED 30 Tab 0   • hydrochlorothiazide (HYDRODIURIL) 12.5 MG tablet Take 1 Tab by mouth every day. 30 Tab 3   • potassium chloride SA (K-DUR) 10 MEQ Tab CR Take 1 Tab by mouth every day. 30 Tab 3   • budesonide-formoterol (SYMBICORT) 160-4.5 MCG/ACT Aerosol Inhale 2 Puffs by mouth 2 Times a Day. 1 Inhaler 11   • amlodipine (NORVASC) 10 MG Tab Take 1 Tab by mouth every day. 90 Tab 1   • famotidine (PEPCID) 20 MG Tab Take 1 Tab by mouth 2 times a day. 180 Tab 3   • lactulose 10 GM/15ML Solution Take 30 mL by mouth 2 times a day. 1 Bottle 3     No current facility-administered medications for this visit.          Social History:     Social History     Social History   • Marital status:      Spouse name: N/A   • Number of children: N/A   • Years of education: N/A     Occupational History   • Not on file.     Social History Main Topics   • Smoking status: Former Smoker     Packs/day: 1.00     Years: 50.00     Types: Cigarettes     Quit date: 3/12/2018   • Smokeless tobacco: Never Used   • Alcohol use No   • Drug use: No   • Sexual activity: No     Other Topics Concern   • Not on file     Social History Narrative   • No narrative on file       Family History:      Family History   Problem Relation Age of Onset   • Lung Disease Mother    • Alcohol/Drug Mother    • Heart Disease Mother    • Cancer Father    • Cancer Brother    • Diabetes Maternal Grandmother    • Stroke Paternal Grandmother        Review of Systems:  All other review of systems are negative except what was mentioned above in the HPI.    Constitutional: Negative for fever, chills, weight loss. Positive for chronic malaise/fatigue.    HEENT: No new auditory or visual complaints. No sore throat and neck pain.     Respiratory: Negative for cough, sputum production, shortness of breath and wheezing.    Cardiovascular: Negative for chest pain, palpitations, orthopnea and leg swelling.    Gastrointestinal: Negative for  "heartburn, nausea, vomiting and abdominal pain.    Genitourinary: Negative for dysuria, hematuria    Musculoskeletal: Severe arthralgias or myalgias   Skin: Negative for rash and itching.    Neurological: Negative for focal weakness and headaches.    Endo/Heme/Allergies: No abnormal bleed/bruise.    Psychiatric/Behavioral: No new depression/anxiety.    Physical Exam:  Vitals: /76   Pulse 77   Temp 36.6 °C (97.8 °F)   Resp 18   Ht 1.651 m (5' 5\")   Wt 94.8 kg (208 lb 15.9 oz)   SpO2 98%   BMI 34.78 kg/m²      General: Not in acute distress, alert and oriented x 3  HEENT: No pallor, icterus. Oropharynx clear.   Neck: Supple, no palpable masses.  Lymph nodes: No palpable cervical, supraclavicular, axillary or inguinal lymphadenopathy.    CVS: regular rate and rhythm, no rubs or gallops  RESP: Clear to auscultate bilaterally, no wheezing or crackles.   ABD: Soft, non tender, non distended, positive bowel sounds, no palpable organomegaly  EXT: No edema or cyanosis  CNS: Alert and oriented x3, No focal deficits.  Skin- No rash      Labs:   No visits with results within 1 Week(s) from this visit.   Latest known visit with results is:   Admission on 11/11/2017, Discharged on 11/12/2017   Component Date Value Ref Range Status   • Troponin I 11/11/2017 <0.01  0.00 - 0.04 ng/mL Final    Comment: The Ultra Troponin I is a highly sensitive assay.  Effective 4-1-2011, the reference range for positive Troponin  has been changed.  This change follows the recommendation of  the American College of Cardiology (ACC) committee in  conjunction with the 99th percentile reference population.  ___________________________________________________  Normal ultra TNI:  0.00-0.04 ng/mL  Clinical Correlation Indicated:  0.05 - 0.78 ng/mL  Suggestive of MI:  >0.78 ng/mL     • B Natriuretic Peptide 11/11/2017 85  0 - 100 pg/mL Final    Comment: Effective 11/08/2012, this assay is being performed using new  instrumentation.  Correlation " studies with the previous  instrumentation showed a negative bias.  For the most part  this is clinically insignificant.  Clinical correlation may  be required when comparing previous results with results  received after 11/07/2012.     • WBC 11/11/2017 13.1* 4.8 - 10.8 K/uL Final   • RBC 11/11/2017 3.80* 4.20 - 5.40 M/uL Final   • Hemoglobin 11/11/2017 11.4* 12.0 - 16.0 g/dL Final   • Hematocrit 11/11/2017 34.0* 37.0 - 47.0 % Final   • MCV 11/11/2017 89.5  81.4 - 97.8 fL Final   • MCH 11/11/2017 30.0  27.0 - 33.0 pg Final   • MCHC 11/11/2017 33.5* 33.6 - 35.0 g/dL Final   • RDW 11/11/2017 42.8  35.9 - 50.0 fL Final   • Platelet Count 11/11/2017 253  164 - 446 K/uL Final   • MPV 11/11/2017 8.5* 9.0 - 12.9 fL Final   • Neutrophils-Polys 11/11/2017 87.40* 44.00 - 72.00 % Final   • Lymphocytes 11/11/2017 6.30* 22.00 - 41.00 % Final   • Monocytes 11/11/2017 4.00  0.00 - 13.40 % Final   • Eosinophils 11/11/2017 1.00  0.00 - 6.90 % Final   • Basophils 11/11/2017 0.40  0.00 - 1.80 % Final   • Immature Granulocytes 11/11/2017 0.90  0.00 - 0.90 % Final   • Nucleated RBC 11/11/2017 0.00  /100 WBC Final   • Neutrophils (Absolute) 11/11/2017 11.46* 2.00 - 7.15 K/uL Final    Includes immature neutrophils, if present.   • Lymphs (Absolute) 11/11/2017 0.82* 1.00 - 4.80 K/uL Final   • Monos (Absolute) 11/11/2017 0.52  0.00 - 0.85 K/uL Final   • Eos (Absolute) 11/11/2017 0.13  0.00 - 0.51 K/uL Final   • Baso (Absolute) 11/11/2017 0.05  0.00 - 0.12 K/uL Final   • Immature Granulocytes (abs) 11/11/2017 0.12* 0.00 - 0.11 K/uL Final   • NRBC (Absolute) 11/11/2017 0.00  K/uL Final   • Sodium 11/11/2017 135  135 - 145 mmol/L Final   • Potassium 11/11/2017 4.3  3.6 - 5.5 mmol/L Final   • Chloride 11/11/2017 102  96 - 112 mmol/L Final   • Co2 11/11/2017 24  20 - 33 mmol/L Final   • Anion Gap 11/11/2017 9.0  0.0 - 11.9 Final   • Glucose 11/11/2017 233* 65 - 99 mg/dL Final   • Bun 11/11/2017 23* 8 - 22 mg/dL Final   • Creatinine 11/11/2017  1.19  0.50 - 1.40 mg/dL Final   • Calcium 2017 9.0  8.5 - 10.5 mg/dL Final   • AST(SGOT) 2017 35  12 - 45 U/L Final   • ALT(SGPT) 2017 21  2 - 50 U/L Final   • Alkaline Phosphatase 2017 61  30 - 99 U/L Final   • Total Bilirubin 2017 0.5  0.1 - 1.5 mg/dL Final   • Albumin 2017 3.5  3.2 - 4.9 g/dL Final   • Total Protein 2017 6.8  6.0 - 8.2 g/dL Final   • Globulin 2017 3.3  1.9 - 3.5 g/dL Final   • A-G Ratio 2017 1.1  g/dL Final   • PT 2017 12.7  12.0 - 14.6 sec Final   • INR 2017 0.98  0.87 - 1.13 Final    Comment: INR - Non-therapeutic Reference Range: 0.87-1.13  INR - Therapeutic Reference Range: 2.0-4.0     • APTT 2017 31.3  24.7 - 36.0 sec Final    Therapeutic Heparin Range: 63-96 seconds   • Lipase 2017 27  11 - 82 U/L Final   • GFR If  2017 55* >60 mL/min/1.73 m 2 Final   • GFR If Non  2017 45* >60 mL/min/1.73 m 2 Final   • Report 2017    Preliminary                    Value:[facility]    Test Date:  2017  Pt Name:    CHAITANYA CABELLO            Department: ER  MRN:        [id number]                      Room:       [facility]  Gender:     F                            Technician: [id number]  :        1950                   Requested By:BRANDON KINNEY  Order #:    [id number]                    Reading MD:    Measurements  Intervals                                Axis  Rate:       71                           P:          36  TX:         200                          QRS:        22  QRSD:       84                           T:          101  QT:         420  QTc:        457    Interpretive Statements  SINUS RHYTHM  BORDERLINE T WAVE ABNORMALITIES  Compared to ECG 2017 22:40:43  Sinus bradycardia no longer present  Ventricular premature complex(es) no longer present  T-wave abnormality still present     • Troponin I 2017 <0.01  0.00 - 0.04  ng/mL Final    Comment: The Ultra Troponin I is a highly sensitive assay.  Effective 2011, the reference range for positive Troponin  has been changed.  This change follows the recommendation of  the American College of Cardiology (ACC) committee in  conjunction with the 99th percentile reference population.  ___________________________________________________  Normal ultra TNI:  0.00-0.04 ng/mL  Clinical Correlation Indicated:  0.05 - 0.78 ng/mL  Suggestive of MI:  >0.78 ng/mL     • Report 2017    Final                    Value:[facility]    Test Date:  2017  Pt Name:    CHAITANYA CABELLO            Department: ER  MRN:        [id number]                      Room:       [facility]  Gender:     F                            Technician: BRODERICK  :        1950                   Requested By:OLIVIA KATE  Order #:    [id number]                    Reading MD: Vicente Fonseca MD    Measurements  Intervals                                Axis  Rate:       79                           P:          49  IN:         192                          QRS:        10  QRSD:       86                           T:          106  QT:         388  QTc:        445    Interpretive Statements  SINUS RHYTHM  NONSPECIFIC T ABNORMALITIES, LATERAL LEADS  Compared to ECG 2017 00:31:59  No significant changes    Electronically Signed On 2017 10:08:48 PST by Vicente Fonseca MD     • Glucose - Accu-Ck 2017 216* 65 - 99 mg/dL Final   • Glucose - Accu-Ck 2017 175* 65 - 99 mg/dL Final   • WBC 2017 12.8* 4.8 - 10.8 K/uL Final   • RBC 2017 3.92* 4.20 - 5.40 M/uL Final   • Hemoglobin 2017 11.9* 12.0 - 16.0 g/dL Final   • Hematocrit 2017 35.0* 37.0 - 47.0 % Final   • MCV 2017 89.3  81.4 - 97.8 fL Final   • MCH 2017 30.4  27.0 - 33.0 pg Final   • MCHC 2017 34.0  33.6 - 35.0 g/dL Final   • RDW 2017 42.6  35.9 - 50.0 fL Final   • Platelet Count 2017 249  164 -  446 K/uL Final   • MPV 11/12/2017 8.4* 9.0 - 12.9 fL Final   • Neutrophils-Polys 11/12/2017 84.90* 44.00 - 72.00 % Final   • Lymphocytes 11/12/2017 9.00* 22.00 - 41.00 % Final   • Monocytes 11/12/2017 5.10  0.00 - 13.40 % Final   • Eosinophils 11/12/2017 0.30  0.00 - 6.90 % Final   • Basophils 11/12/2017 0.20  0.00 - 1.80 % Final   • Immature Granulocytes 11/12/2017 0.50  0.00 - 0.90 % Final   • Nucleated RBC 11/12/2017 0.00  /100 WBC Final   • Neutrophils (Absolute) 11/12/2017 10.89* 2.00 - 7.15 K/uL Final    Includes immature neutrophils, if present.   • Lymphs (Absolute) 11/12/2017 1.15  1.00 - 4.80 K/uL Final   • Monos (Absolute) 11/12/2017 0.65  0.00 - 0.85 K/uL Final   • Eos (Absolute) 11/12/2017 0.04  0.00 - 0.51 K/uL Final   • Baso (Absolute) 11/12/2017 0.03  0.00 - 0.12 K/uL Final   • Immature Granulocytes (abs) 11/12/2017 0.06  0.00 - 0.11 K/uL Final   • NRBC (Absolute) 11/12/2017 0.00  K/uL Final             Assessment and Plan:  #1 anemia in the setting of chronic inflammation/arthralgias/CKD- she tends to develop worsening anemia during her hospitalizations. Extensive anemia workup has been negative, except highly elevated ESR, reflecting chronic inflammation. She also has debilitating arthralgias. I did check ALEXANDER and rheumatoid factor screen, which was negative. CBC is overall stable., It appears that she has polymyalgia rheumatica. , She may benefit from steroids , however, given her diabetes, I will not start this. We will inform her PCP about trying them. Steroids. We will also refer her to rheumatology. Return to clinic when necessary  She agreed and verbalized  agreement and understanding with the current plan.  I answered all questions and concerns at this time         Please note that this dictation was created using voice recognition software. I have made every reasonable attempt to correct obvious errors, but I expect that there are errors of grammar and possibly content that I did not  discover before finalizing the note.      SIGNATURES:  Brian Olea    CC:  Kavitha Mccall M.D.  No ref. provider found        No Recipients

## 2018-05-16 NOTE — PROGRESS NOTES
Priya Callahan is a 67 y.o. female here for a non-provider visit for: Lab Draws  on 5/16/2018 at 12:03 PM    Procedure Performed: Venipuncture     Anatomical site: Left Antecubital Area (AC)    Equipment used: 21g Butterfly     Labs drawn: ALEXANDER antibody with reflex, CBC w/diff, CMP, Ferritin and Rheumatoid arthritis factor, b12, iron    Ordering Provider: Dr. Brian Baldwin By: Emma Tamayo, Med Ass't    Without incident.

## 2018-05-16 NOTE — TELEPHONE ENCOUNTER
Called pt to request she have a repeat cmp drawn as her labs from 5/16 may be hemolyzed.  Left voicemail requesting call back to RN at [phone].

## 2018-05-16 NOTE — PROGRESS NOTES
"Date of visit: 5/16/2018  11:23 AM      Chief Complaint- Anemia.       Identification/Prior relevant history:   significant history of COPD who had a prolonged hospital admission back in July 2017. She presented with respiratory failure requiring ICU stay. CT chest showed no pulmonary embolism. She had bilateral lung opacities with some mild mediastinal and hilar adenopathy. She had significant anemia during the hospitalization and received around 3-4 units of packed red blood cells. She had gradual recovery. Review of prior CBCs have shown baseline hemoglobin around 10-12 g/dL with significant worsening of anemia during hospitalizations.    Interim history- I saw her back in August 2017. Rest of the anemia workup was unremarkable with no significant, and deficiency, normal LDH levels. She did have significant elevation of ESR. Myeloma workup was negative. Her hemoglobin has subsequently improved. She has significant arthralgias    Past Medical History:      Past Medical History:   Diagnosis Date   • Arthritis     \"everywhere\"   • ASTHMA    • Backpain     chronic back pain   • Breath shortness     \"only with flare up with allergies\"   • Bronchitis     as a child   • Congestive heart failure (HCC) 2013    related to pulmonary failure   • COPD    • Diabetes     Type 2   • Disorder of thyroid     Hypothyroid   • Fall    • Fibromyalgia    • GERD (gastroesophageal reflux disease)    • Heart burn    • Hepatitis C     \"I have markers in blood but not active\"   • Hypertension    • Indigestion    • Infectious disease     Hepatitis C   • Neuropathy (HCC)     bilat feet   • On home oxygen therapy    • Other specified symptom associated with female genital organs     Hysterectomy at age 22yrs   • Pain 9/13/2016    \"pain everywhere\"   • PND (post-nasal drip)    • Pneumonia     as a child   • Psychiatric problem 9/13/2016    PTSD   • RLS (restless legs syndrome)    • Sleep apnea     does not wear CPAP, \"can't do it\"   • " Unspecified urinary incontinence        Past surgical history:       Past Surgical History:   Procedure Laterality Date   • ANKLE ORIF Left 5/8/2017    Procedure: ANKLE ORIF;  Surgeon: Rico Gtz M.D.;  Location: [facility];  Service:    • NH DSTR NROLYTC AGNT PARVERTEB FCT SNGL LMBR/SACRAL Left 9/16/2016    Procedure: NEURO DEST FACET L/S W/IG SNGL - L3-S1;  Surgeon: Xavier Arteaga D.O.;  Location: [facility];  Service: Pain Management   • NH DSTR NROLYTC AGNT PARVERTEB FCT ADDL LMBR/SACRAL  9/16/2016    Procedure: NEURO DEST FACET L/S W/IG ADDL;  Surgeon: Xavier Aretaga D.O.;  Location: [facility];  Service: Pain Management   • NH DSTR NROLYTC AGNT PARVERTEB FCT ADDL LMBR/SACRAL  9/16/2016    Procedure: NEURO DEST FACET L/S W/IG ADDL;  Surgeon: Xavier Arteaga D.O.;  Location: [facility];  Service: Pain Management   • PB FLUOROSCOPIC GUIDANCE NEEDLE PLACEMENT  9/16/2016    Procedure: FLOURO GUIDE NEEDLE PLACEMENT;  Surgeon: Xavier Arteaga D.O.;  Location: [facility];  Service: Pain Management   • NH DSTR NROLYTC AGNT PARVERTEB FCT SNGL LMBR/SACRAL Right 11/6/2015    Procedure: NEURO DEST FACET L/S W/IG SNGL - L3-S1;  Surgeon: Xavier Arteaga D.O.;  Location: [facility];  Service: Pain Management   • NH DSTR NROLYTC AGNT PARVERTEB FCT ADDL LMBR/SACRAL  11/6/2015    Procedure: NEURO DEST FACET L/S W/IG ADDL;  Surgeon: Xavier Arteaga D.O.;  Location: [facility];  Service: Pain Management   • PB INJECT RX OTHER PERIPH NERVE  11/6/2015    Procedure: NEUROLYTIC DEST-OTHER NERVE;  Surgeon: Xavier Arteaga D.O.;  Location: [facility];  Service: Pain Management   • NH DSTR NROLYTC AGNT PARVERTEB FCT ADDL LMBR/SACRAL  11/6/2015    Procedure: NEURO DEST FACET L/S W/IG ADDL;  Surgeon: Xavier Arteaga D.O.;  [facility];  Service: Pain Management   •  NM DSTR NROLYTC AGNT PARVERTEB FCT SNGL LMBR/SACRAL Left 10/2/2015    Procedure: NEURO DEST FACET L/S W/IG SNGL - L3-S1;  Surgeon: Xavier Arteaga D.O.;  Location: [facility];  Service: Pain Management   • NM DSTR NROLYTC AGNT PARVERTEB FCT ADDL LMBR/SACRAL  10/2/2015    Procedure: NEURO DEST FACET L/S W/IG ADDL;  Surgeon: Xavier Arteaga D.O.;  Location: [facility];  Service: Pain Management   • PB INJECT RX OTHER PERIPH NERVE  10/2/2015    Procedure: NEUROLYTIC DEST-OTHER NERVE;  Surgeon: Xavier Arteaga D.O.;  Location: [facility];  Service: Pain Management   • NM DSTR NROLYTC AGNT PARVERTEB FCT ADDL LMBR/SACRAL  10/2/2015    Procedure: NEURO DEST FACET L/S W/IG ADDL;  Surgeon: Xavier Arteaga D.O.;  Location: [facility];  Service: Pain Management   • PB INJ DX/THER AGNT PARAVERT FACET JOINT, OBED* Left 7/17/2015    Procedure: INJ PARA FACET L/S 1 LVL W/IG - L3-S1;  Surgeon: Xavier Arteaga D.O.;  Location: [facility];  Service: Pain Management   • PB INJ DX/THER AGNT PARAVERT FACET JOINT, OBED*  7/17/2015    Procedure: INJ PARA FACET L/S 2D LVL W/IG;  Surgeon: Xavier Arteaga D.O.;  Location: [facility];  Service: Pain Management   • PB INJ DX/THER AGNT PARAVERT FACET JOINT, OBED*  7/17/2015    Procedure: INJ PARA FACET L/S 3D LVL W/IG;  Surgeon: Xavier Arteaga D.O.;  Location: [facility];  Service: Pain Management   • PB INJECT NERV BLCK,OTHR PERIPH NERV  7/17/2015    Procedure: INJ-ANES AGENT-OTHER PHER.NRVE;  Surgeon: Xavier Arteaga D.O.;  Location: [facility];  Service: Pain Management   • PB INJ DX/THER AGNT PARAVERT FACET JOINT, OBED* Left 7/10/2015    Procedure: INJ PARA FACET L/S 1 LVL W/IG - L3-S1;  Surgeon: Xavier Arteaga D.O.;  [facility];  Service: Pain Management   • PB INJ DX/THER AGNT PARAVERT FACET JOINT, OBED*  7/10/2015     Procedure: INJ PARA FACET L/S 2D LVL W/IG;  Surgeon: Xavier Arteaga D.O.;  [facility];  Service: Pain Management   • PB INJ DX/THER AGNT PARAVERT FACET JOINT, OBED*  7/10/2015    Procedure: INJ PARA FACET L/S 3D LVL W/IG;  Surgeon: Xavier Arteaga D.O.;  [facility];  Service: Pain Management   • PB INJECT NERV BLCK,OTHR PERIPH NERV  7/10/2015    Procedure: INJ-ANES AGENT-OTHER PHER.NRVE;  Surgeon: Xavier Arteaga D.O.;  [facility];  Service: Pain Management   • GYN SURGERY      hysterectomy    • LUMPECTOMY Left     Left breast   • OTHER ORTHOPEDIC SURGERY      L knee replacement    • OTHER ORTHOPEDIC SURGERY      R carpal tunnel    • US-NEEDLE CORE BX-BREAST PANEL         Allergies:       Vitamin c; Keflex; Codeine; Gabapentin; Lyrica; and Sudafed    Medications:         Current Outpatient Prescriptions   Medication Sig Dispense Refill   • DULoxetine (CYMBALTA) 60 MG Cap DR Particles delayed-release capsule Take 1 Cap by mouth every day. 90 Cap 3   • benzonatate (TESSALON) 100 MG Cap Take 1 Cap by mouth 3 times a day as needed for Cough. 60 Cap 0   • SPIRIVA HANDIHALER 18 MCG Cap INHALE 1 CAPSULE VIA HANDIHALER BY MOUTH DAILY 90 Cap 0   • lisinopril (PRINIVIL, ZESTRIL) 40 MG tablet TAKE 1 TABLET BY MOUTH DAILY 90 Tab 0   • LANTUS SOLOSTAR 100 UNIT/ML Solution Pen-injector injection INJECT 5-20 UNITS SUBCUTANEOUSLY EVERY EVENING 15 mL 0   • levothyroxine (SYNTHROID) 75 MCG Tab TAKE 1 TABLET BY MOUTH DAILY 90 Tab 1   • Diclofenac Sodium 1 % Gel Apply  to skin as directed.     • trazodone (DESYREL) 150 MG Tab TAKE 2 TABLETS BY MOUTH NIGHTLY AT BEDTIME 180 Tab 0   • cyclobenzaprine (FLEXERIL) 10 MG Tab TAKE 1 TABLET BY MOUTH TWO TIMES DAILY AS NEEDED 30 Tab 0   • hydrochlorothiazide (HYDRODIURIL) 12.5 MG tablet Take 1 Tab by mouth every day. 30 Tab 3   • potassium chloride SA (K-DUR) 10 MEQ Tab CR Take 1 Tab by mouth every day. 30 Tab 3    • budesonide-formoterol (SYMBICORT) 160-4.5 MCG/ACT Aerosol Inhale 2 Puffs by mouth 2 Times a Day. 1 Inhaler 11   • amlodipine (NORVASC) 10 MG Tab Take 1 Tab by mouth every day. 90 Tab 1   • famotidine (PEPCID) 20 MG Tab Take 1 Tab by mouth 2 times a day. 180 Tab 3   • lactulose 10 GM/15ML Solution Take 30 mL by mouth 2 times a day. 1 Bottle 3     No current facility-administered medications for this visit.          Social History:     Social History     Social History   • Marital status:      Spouse name: N/A   • Number of children: N/A   • Years of education: N/A     Occupational History   • Not on file.     Social History Main Topics   • Smoking status: Former Smoker     Packs/day: 1.00     Years: 50.00     Types: Cigarettes     Quit date: 3/12/2018   • Smokeless tobacco: Never Used   • Alcohol use No   • Drug use: No   • Sexual activity: No     Other Topics Concern   • Not on file     Social History Narrative   • No narrative on file       Family History:      Family History   Problem Relation Age of Onset   • Lung Disease Mother    • Alcohol/Drug Mother    • Heart Disease Mother    • Cancer Father    • Cancer Brother    • Diabetes Maternal Grandmother    • Stroke Paternal Grandmother        Review of Systems:  All other review of systems are negative except what was mentioned above in the HPI.    Constitutional: Negative for fever, chills, weight loss. Positive for chronic malaise/fatigue.    HEENT: No new auditory or visual complaints. No sore throat and neck pain.     Respiratory: Negative for cough, sputum production, shortness of breath and wheezing.    Cardiovascular: Negative for chest pain, palpitations, orthopnea and leg swelling.    Gastrointestinal: Negative for heartburn, nausea, vomiting and abdominal pain.    Genitourinary: Negative for dysuria, hematuria    Musculoskeletal: Severe arthralgias or myalgias   Skin: Negative for rash and itching.    Neurological: Negative for focal weakness  "and headaches.    Endo/Heme/Allergies: No abnormal bleed/bruise.    Psychiatric/Behavioral: No new depression/anxiety.    Physical Exam:  Vitals: /76   Pulse 77   Temp 36.6 °C (97.8 °F)   Resp 18   Ht 1.651 m (5' 5\")   Wt 94.8 kg (208 lb 15.9 oz)   SpO2 98%   BMI 34.78 kg/m²     General: Not in acute distress, alert and oriented x 3  HEENT: No pallor, icterus. Oropharynx clear.   Neck: Supple, no palpable masses.  Lymph nodes: No palpable cervical, supraclavicular, axillary or inguinal lymphadenopathy.    CVS: regular rate and rhythm, no rubs or gallops  RESP: Clear to auscultate bilaterally, no wheezing or crackles.   ABD: Soft, non tender, non distended, positive bowel sounds, no palpable organomegaly  EXT: No edema or cyanosis  CNS: Alert and oriented x3, No focal deficits.  Skin- No rash      Labs:   No visits with results within 1 Week(s) from this visit.   Latest known visit with results is:   Admission on 11/11/2017, Discharged on 11/12/2017   Component Date Value Ref Range Status   • Troponin I 11/11/2017 <0.01  0.00 - 0.04 ng/mL Final    Comment: The Ultra Troponin I is a highly sensitive assay.  Effective 4-1-2011, the reference range for positive Troponin  has been changed.  This change follows the recommendation of  the American College of Cardiology (ACC) committee in  conjunction with the 99th percentile reference population.  ___________________________________________________  Normal ultra TNI:  0.00-0.04 ng/mL  Clinical Correlation Indicated:  0.05 - 0.78 ng/mL  Suggestive of MI:  >0.78 ng/mL     • B Natriuretic Peptide 11/11/2017 85  0 - 100 pg/mL Final    Comment: Effective 11/08/2012, this assay is being performed using new  instrumentation.  Correlation studies with the previous  instrumentation showed a negative bias.  For the most part  this is clinically insignificant.  Clinical correlation may  be required when comparing previous results with results  received after " 11/07/2012.     • WBC 11/11/2017 13.1* 4.8 - 10.8 K/uL Final   • RBC 11/11/2017 3.80* 4.20 - 5.40 M/uL Final   • Hemoglobin 11/11/2017 11.4* 12.0 - 16.0 g/dL Final   • Hematocrit 11/11/2017 34.0* 37.0 - 47.0 % Final   • MCV 11/11/2017 89.5  81.4 - 97.8 fL Final   • MCH 11/11/2017 30.0  27.0 - 33.0 pg Final   • MCHC 11/11/2017 33.5* 33.6 - 35.0 g/dL Final   • RDW 11/11/2017 42.8  35.9 - 50.0 fL Final   • Platelet Count 11/11/2017 253  164 - 446 K/uL Final   • MPV 11/11/2017 8.5* 9.0 - 12.9 fL Final   • Neutrophils-Polys 11/11/2017 87.40* 44.00 - 72.00 % Final   • Lymphocytes 11/11/2017 6.30* 22.00 - 41.00 % Final   • Monocytes 11/11/2017 4.00  0.00 - 13.40 % Final   • Eosinophils 11/11/2017 1.00  0.00 - 6.90 % Final   • Basophils 11/11/2017 0.40  0.00 - 1.80 % Final   • Immature Granulocytes 11/11/2017 0.90  0.00 - 0.90 % Final   • Nucleated RBC 11/11/2017 0.00  /100 WBC Final   • Neutrophils (Absolute) 11/11/2017 11.46* 2.00 - 7.15 K/uL Final    Includes immature neutrophils, if present.   • Lymphs (Absolute) 11/11/2017 0.82* 1.00 - 4.80 K/uL Final   • Monos (Absolute) 11/11/2017 0.52  0.00 - 0.85 K/uL Final   • Eos (Absolute) 11/11/2017 0.13  0.00 - 0.51 K/uL Final   • Baso (Absolute) 11/11/2017 0.05  0.00 - 0.12 K/uL Final   • Immature Granulocytes (abs) 11/11/2017 0.12* 0.00 - 0.11 K/uL Final   • NRBC (Absolute) 11/11/2017 0.00  K/uL Final   • Sodium 11/11/2017 135  135 - 145 mmol/L Final   • Potassium 11/11/2017 4.3  3.6 - 5.5 mmol/L Final   • Chloride 11/11/2017 102  96 - 112 mmol/L Final   • Co2 11/11/2017 24  20 - 33 mmol/L Final   • Anion Gap 11/11/2017 9.0  0.0 - 11.9 Final   • Glucose 11/11/2017 233* 65 - 99 mg/dL Final   • Bun 11/11/2017 23* 8 - 22 mg/dL Final   • Creatinine 11/11/2017 1.19  0.50 - 1.40 mg/dL Final   • Calcium 11/11/2017 9.0  8.5 - 10.5 mg/dL Final   • AST(SGOT) 11/11/2017 35  12 - 45 U/L Final   • ALT(SGPT) 11/11/2017 21  2 - 50 U/L Final   • Alkaline Phosphatase 11/11/2017 61  30 - 99  U/L Final   • Total Bilirubin 2017 0.5  0.1 - 1.5 mg/dL Final   • Albumin 2017 3.5  3.2 - 4.9 g/dL Final   • Total Protein 2017 6.8  6.0 - 8.2 g/dL Final   • Globulin 2017 3.3  1.9 - 3.5 g/dL Final   • A-G Ratio 2017 1.1  g/dL Final   • PT 2017 12.7  12.0 - 14.6 sec Final   • INR 2017 0.98  0.87 - 1.13 Final    Comment: INR - Non-therapeutic Reference Range: 0.87-1.13  INR - Therapeutic Reference Range: 2.0-4.0     • APTT 2017 31.3  24.7 - 36.0 sec Final    Therapeutic Heparin Range: 63-96 seconds   • Lipase 2017 27  11 - 82 U/L Final   • GFR If  2017 55* >60 mL/min/1.73 m 2 Final   • GFR If Non  2017 45* >60 mL/min/1.73 m 2 Final   • Report 2017    Preliminary                    Value:[facility]    Test Date:  2017  Pt Name:    CHAITANYA CABELLO            Department: ER  MRN:        [id number]                      Room:       [facility]  Gender:     F                            Technician: [id number]  :        [id number]                   Requested By:BRANDON KINNEY  Order #:    [id number]                    Reading MD:    Measurements  Intervals                                Axis  Rate:       71                           P:          36  OH:         200                          QRS:        22  QRSD:       84                           T:          101  QT:         420  QTc:        457    Interpretive Statements  SINUS RHYTHM  BORDERLINE T WAVE ABNORMALITIES  Compared to ECG 2017 22:40:43  Sinus bradycardia no longer present  Ventricular premature complex(es) no longer present  T-wave abnormality still present     • Troponin I 2017 <0.01  0.00 - 0.04 ng/mL Final    Comment: The Ultra Troponin I is a highly sensitive assay.  Effective 2011, the reference range for positive Troponin  has been changed.  This change follows the recommendation of  the American College of  Cardiology (ACC) committee in  conjunction with the 99th percentile reference population.  ___________________________________________________  Normal ultra TNI:  0.00-0.04 ng/mL  Clinical Correlation Indicated:  0.05 - 0.78 ng/mL  Suggestive of MI:  >0.78 ng/mL     • Report 2017    Final                    Value:[facility]    Test Date:  2017  Pt Name:    CHAITANYA CABELLO            Department: ER  MRN:        [id number]                      Room:       [facility]  Gender:     F                            Technician: BRODERICK  :        1950                   Requested By:OLIVIA KATE  Order #:    [id number]                    Reading MD: Vicente Fonseca MD    Measurements  Intervals                                Axis  Rate:       79                           P:          49  NY:         192                          QRS:        10  QRSD:       86                           T:          106  QT:         388  QTc:        445    Interpretive Statements  SINUS RHYTHM  NONSPECIFIC T ABNORMALITIES, LATERAL LEADS  Compared to ECG 2017 00:31:59  No significant changes    Electronically Signed On 2017 10:08:48 PST by Vicente Fonseca MD     • Glucose - Accu-Ck 2017 216* 65 - 99 mg/dL Final   • Glucose - Accu-Ck 2017 175* 65 - 99 mg/dL Final   • WBC 2017 12.8* 4.8 - 10.8 K/uL Final   • RBC 2017 3.92* 4.20 - 5.40 M/uL Final   • Hemoglobin 2017 11.9* 12.0 - 16.0 g/dL Final   • Hematocrit 2017 35.0* 37.0 - 47.0 % Final   • MCV 2017 89.3  81.4 - 97.8 fL Final   • MCH 2017 30.4  27.0 - 33.0 pg Final   • MCHC 2017 34.0  33.6 - 35.0 g/dL Final   • RDW 2017 42.6  35.9 - 50.0 fL Final   • Platelet Count 2017 249  164 - 446 K/uL Final   • MPV 2017 8.4* 9.0 - 12.9 fL Final   • Neutrophils-Polys 2017 84.90* 44.00 - 72.00 % Final   • Lymphocytes 2017 9.00* 22.00 - 41.00 % Final   • Monocytes 2017 5.10  0.00 - 13.40 %  Final   • Eosinophils 11/12/2017 0.30  0.00 - 6.90 % Final   • Basophils 11/12/2017 0.20  0.00 - 1.80 % Final   • Immature Granulocytes 11/12/2017 0.50  0.00 - 0.90 % Final   • Nucleated RBC 11/12/2017 0.00  /100 WBC Final   • Neutrophils (Absolute) 11/12/2017 10.89* 2.00 - 7.15 K/uL Final    Includes immature neutrophils, if present.   • Lymphs (Absolute) 11/12/2017 1.15  1.00 - 4.80 K/uL Final   • Monos (Absolute) 11/12/2017 0.65  0.00 - 0.85 K/uL Final   • Eos (Absolute) 11/12/2017 0.04  0.00 - 0.51 K/uL Final   • Baso (Absolute) 11/12/2017 0.03  0.00 - 0.12 K/uL Final   • Immature Granulocytes (abs) 11/12/2017 0.06  0.00 - 0.11 K/uL Final   • NRBC (Absolute) 11/12/2017 0.00  K/uL Final             Assessment and Plan:  #1 anemia in the setting of chronic inflammation/arthralgias/CKD- she tends to develop worsening anemia during her hospitalizations. Extensive anemia workup has been negative, except highly elevated ESR, reflecting chronic inflammation. She also has debilitating arthralgias. I did check ALEXANDER and rheumatoid factor screen, which was negative. CBC is overall stable., It appears that she has polymyalgia rheumatica. , She may benefit from steroids , however, given her diabetes, I will not start this. We will inform her PCP about trying them. Steroids. We will also refer her to rheumatology. Return to clinic when necessary  She agreed and verbalized  agreement and understanding with the current plan.  I answered all questions and concerns at this time         Please note that this dictation was created using voice recognition software. I have made every reasonable attempt to correct obvious errors, but I expect that there are errors of grammar and possibly content that I did not discover before finalizing the note.      SIGNATURES:  Brian Olea    CC:  Kavitha Mccall M.D.  No ref. provider found

## 2018-05-17 LAB — NUCLEAR IGG SER QL IA: NORMAL

## 2018-05-21 ENCOUNTER — TELEPHONE (OUTPATIENT)
Dept: MEDICAL GROUP | Facility: PHYSICIAN GROUP | Age: 68
End: 2018-05-21

## 2018-05-21 ENCOUNTER — HOSPITAL ENCOUNTER (OUTPATIENT)
Dept: LAB | Facility: MEDICAL CENTER | Age: 68
End: 2018-05-21
Attending: INTERNAL MEDICINE
Payer: MEDICARE

## 2018-05-21 DIAGNOSIS — G89.4 CHRONIC PAIN SYNDROME: Chronic | ICD-10-CM

## 2018-05-21 DIAGNOSIS — E87.5 HYPERKALEMIA: ICD-10-CM

## 2018-05-21 DIAGNOSIS — M15.9 PRIMARY OSTEOARTHRITIS INVOLVING MULTIPLE JOINTS: ICD-10-CM

## 2018-05-21 LAB
ALBUMIN SERPL BCP-MCNC: 4.3 G/DL (ref 3.2–4.9)
ALBUMIN/GLOB SERPL: 1.5 G/DL
ALP SERPL-CCNC: 56 U/L (ref 30–99)
ALT SERPL-CCNC: 20 U/L (ref 2–50)
ANION GAP SERPL CALC-SCNC: 8 MMOL/L (ref 0–11.9)
AST SERPL-CCNC: 19 U/L (ref 12–45)
BILIRUB SERPL-MCNC: 0.3 MG/DL (ref 0.1–1.5)
BUN SERPL-MCNC: 38 MG/DL (ref 8–22)
CALCIUM SERPL-MCNC: 9.9 MG/DL (ref 8.5–10.5)
CHLORIDE SERPL-SCNC: 106 MMOL/L (ref 96–112)
CO2 SERPL-SCNC: 24 MMOL/L (ref 20–33)
CREAT SERPL-MCNC: 1.73 MG/DL (ref 0.5–1.4)
GLOBULIN SER CALC-MCNC: 2.8 G/DL (ref 1.9–3.5)
GLUCOSE SERPL-MCNC: 114 MG/DL (ref 65–99)
POTASSIUM SERPL-SCNC: 6.2 MMOL/L (ref 3.6–5.5)
PROT SERPL-MCNC: 7.1 G/DL (ref 6–8.2)
SODIUM SERPL-SCNC: 138 MMOL/L (ref 135–145)

## 2018-05-21 PROCEDURE — 80053 COMPREHEN METABOLIC PANEL: CPT

## 2018-05-21 PROCEDURE — 36415 COLL VENOUS BLD VENIPUNCTURE: CPT

## 2018-05-21 RX ORDER — OXYCODONE HYDROCHLORIDE 10 MG/1
10 TABLET ORAL EVERY 4 HOURS PRN
Qty: 90 TAB | Refills: 0 | Status: SHIPPED | OUTPATIENT
Start: 2018-05-21 | End: 2018-05-30 | Stop reason: SDUPTHER

## 2018-05-22 NOTE — TELEPHONE ENCOUNTER
Was the patient seen in the last year in this department? Yes     Does patient have an active prescription for medications requested? No     Received Request Via: Pharmacy      Pt met protocol?: Yes      OV 4/18

## 2018-05-22 NOTE — TELEPHONE ENCOUNTER
Marina,   Patient missed my appt on 5/16 as she also had oncology scheduled that day. So she has run out of her pain medication. I gave her a Rx today for 30 days.   She sees you on 5/30/18 If you would check a UDS please and give her two more 30 day Rx and a follow up visit with me for refills separate from her annual wellness visit please.   Thank you,   Kavitha

## 2018-05-22 NOTE — TELEPHONE ENCOUNTER
----- Message from Anthony Saavedra, Med Ass't sent at 5/21/2018  3:07 PM PDT -----  Regarding: Refill  Contact: [phone]  Pt waiting in lobby x 40 min, waiting for RX script. Pt upset.

## 2018-05-23 ENCOUNTER — HOSPITAL ENCOUNTER (OUTPATIENT)
Dept: LAB | Facility: MEDICAL CENTER | Age: 68
End: 2018-05-23
Attending: FAMILY MEDICINE
Payer: MEDICARE

## 2018-05-23 DIAGNOSIS — Z20.5 EXPOSURE TO HEPATITIS C: ICD-10-CM

## 2018-05-23 DIAGNOSIS — D64.9 ANEMIA, UNSPECIFIED TYPE: ICD-10-CM

## 2018-05-23 LAB
ALBUMIN SERPL BCP-MCNC: 4.1 G/DL (ref 3.2–4.9)
ALBUMIN/GLOB SERPL: 1.6 G/DL
ALP SERPL-CCNC: 47 U/L (ref 30–99)
ALT SERPL-CCNC: 17 U/L (ref 2–50)
ANION GAP SERPL CALC-SCNC: 4 MMOL/L (ref 0–11.9)
AST SERPL-CCNC: 17 U/L (ref 12–45)
BASOPHILS # BLD AUTO: 0.6 % (ref 0–1.8)
BASOPHILS # BLD: 0.05 K/UL (ref 0–0.12)
BILIRUB SERPL-MCNC: 0.3 MG/DL (ref 0.1–1.5)
BUN SERPL-MCNC: 33 MG/DL (ref 8–22)
CALCIUM SERPL-MCNC: 9.6 MG/DL (ref 8.5–10.5)
CHLORIDE SERPL-SCNC: 108 MMOL/L (ref 96–112)
CO2 SERPL-SCNC: 24 MMOL/L (ref 20–33)
CREAT SERPL-MCNC: 1.91 MG/DL (ref 0.5–1.4)
EOSINOPHIL # BLD AUTO: 0.2 K/UL (ref 0–0.51)
EOSINOPHIL NFR BLD: 2.3 % (ref 0–6.9)
ERYTHROCYTE [DISTWIDTH] IN BLOOD BY AUTOMATED COUNT: 46.5 FL (ref 35.9–50)
EST. AVERAGE GLUCOSE BLD GHB EST-MCNC: 120 MG/DL
GLOBULIN SER CALC-MCNC: 2.6 G/DL (ref 1.9–3.5)
GLUCOSE SERPL-MCNC: 98 MG/DL (ref 65–99)
HBA1C MFR BLD: 5.8 % (ref 0–5.6)
HCT VFR BLD AUTO: 34.1 % (ref 37–47)
HCV AB SER QL: REACTIVE
HGB BLD-MCNC: 10.8 G/DL (ref 12–16)
IMM GRANULOCYTES # BLD AUTO: 0.07 K/UL (ref 0–0.11)
IMM GRANULOCYTES NFR BLD AUTO: 0.8 % (ref 0–0.9)
LYMPHOCYTES # BLD AUTO: 1.98 K/UL (ref 1–4.8)
LYMPHOCYTES NFR BLD: 23 % (ref 22–41)
MCH RBC QN AUTO: 31.9 PG (ref 27–33)
MCHC RBC AUTO-ENTMCNC: 31.7 G/DL (ref 33.6–35)
MCV RBC AUTO: 100.6 FL (ref 81.4–97.8)
MONOCYTES # BLD AUTO: 0.65 K/UL (ref 0–0.85)
MONOCYTES NFR BLD AUTO: 7.5 % (ref 0–13.4)
NEUTROPHILS # BLD AUTO: 5.66 K/UL (ref 2–7.15)
NEUTROPHILS NFR BLD: 65.8 % (ref 44–72)
NRBC # BLD AUTO: 0 K/UL
NRBC BLD-RTO: 0 /100 WBC
PLATELET # BLD AUTO: 289 K/UL (ref 164–446)
PMV BLD AUTO: 9.4 FL (ref 9–12.9)
POTASSIUM SERPL-SCNC: 5.7 MMOL/L (ref 3.6–5.5)
PROT SERPL-MCNC: 6.7 G/DL (ref 6–8.2)
RBC # BLD AUTO: 3.39 M/UL (ref 4.2–5.4)
SODIUM SERPL-SCNC: 136 MMOL/L (ref 135–145)
WBC # BLD AUTO: 8.6 K/UL (ref 4.8–10.8)

## 2018-05-23 PROCEDURE — 86803 HEPATITIS C AB TEST: CPT

## 2018-05-23 PROCEDURE — 36415 COLL VENOUS BLD VENIPUNCTURE: CPT

## 2018-05-23 PROCEDURE — 85025 COMPLETE CBC W/AUTO DIFF WBC: CPT

## 2018-05-23 PROCEDURE — 80053 COMPREHEN METABOLIC PANEL: CPT

## 2018-05-23 PROCEDURE — 83036 HEMOGLOBIN GLYCOSYLATED A1C: CPT

## 2018-05-23 PROCEDURE — 87522 HEPATITIS C REVRS TRNSCRPJ: CPT

## 2018-05-23 RX ORDER — TRAZODONE HYDROCHLORIDE 150 MG/1
TABLET ORAL
Qty: 180 TAB | Refills: 0 | Status: SHIPPED | OUTPATIENT
Start: 2018-05-23 | End: 2018-07-20 | Stop reason: SDUPTHER

## 2018-05-24 ENCOUNTER — TELEPHONE (OUTPATIENT)
Dept: MEDICAL GROUP | Facility: PHYSICIAN GROUP | Age: 68
End: 2018-05-24

## 2018-05-24 NOTE — TELEPHONE ENCOUNTER
She should take only half of the lisinopril 40 mg   So take lisinopril 20 mg and recheck blood pressures.for a few days.  If still below 90/60 persistently, then stop lisinopril.   Kavitha Mccall M.D.

## 2018-05-24 NOTE — TELEPHONE ENCOUNTER
Helena with [facility] [phone], informing Priya BP has been running low.  98/47, 91/57, 88/54.  Priya has appt next week with Reva Malik asking if Priya should continue meds as directed until her appt next week

## 2018-05-25 LAB
HCV RNA SERPL NAA+PROBE-ACNC: <15 IU/ML
HCV RNA SERPL NAA+PROBE-LOG IU: <1.2 LOG IU
HCV RNA SERPL QL NAA+PROBE: NOT DETECTED
PATHOLOGY STUDY: NORMAL

## 2018-05-25 NOTE — TELEPHONE ENCOUNTER
Called Allison at [phone] Spoke with Jessica who is the On Call RN. Advised of provider notes and she will inform Helena of message tomorrow 5/25/18.

## 2018-05-30 ENCOUNTER — OFFICE VISIT (OUTPATIENT)
Dept: MEDICAL GROUP | Facility: PHYSICIAN GROUP | Age: 68
End: 2018-05-30
Payer: MEDICARE

## 2018-05-30 VITALS
OXYGEN SATURATION: 94 % | WEIGHT: 207.6 LBS | HEIGHT: 65 IN | DIASTOLIC BLOOD PRESSURE: 60 MMHG | TEMPERATURE: 98.1 F | RESPIRATION RATE: 16 BRPM | HEART RATE: 71 BPM | SYSTOLIC BLOOD PRESSURE: 108 MMHG | BODY MASS INDEX: 34.59 KG/M2

## 2018-05-30 DIAGNOSIS — M47.16 LUMBAR SPONDYLOSIS WITH MYELOPATHY: ICD-10-CM

## 2018-05-30 DIAGNOSIS — L98.491 SKIN ULCER, LIMITED TO BREAKDOWN OF SKIN (HCC): ICD-10-CM

## 2018-05-30 DIAGNOSIS — Z79.891 CHRONIC USE OF OPIATE DRUGS THERAPEUTIC PURPOSES: ICD-10-CM

## 2018-05-30 DIAGNOSIS — G89.4 CHRONIC PAIN SYNDROME: Chronic | ICD-10-CM

## 2018-05-30 DIAGNOSIS — F51.01 PRIMARY INSOMNIA: ICD-10-CM

## 2018-05-30 DIAGNOSIS — I10 CHRONIC HYPERTENSION: Chronic | ICD-10-CM

## 2018-05-30 DIAGNOSIS — M15.9 PRIMARY OSTEOARTHRITIS INVOLVING MULTIPLE JOINTS: ICD-10-CM

## 2018-05-30 DIAGNOSIS — M25.561 CHRONIC PAIN OF RIGHT KNEE: ICD-10-CM

## 2018-05-30 DIAGNOSIS — F17.210 CIGARETTE NICOTINE DEPENDENCE WITHOUT COMPLICATION: ICD-10-CM

## 2018-05-30 DIAGNOSIS — G89.29 CHRONIC PAIN OF RIGHT KNEE: ICD-10-CM

## 2018-05-30 PROCEDURE — 99214 OFFICE O/P EST MOD 30 MIN: CPT | Performed by: NURSE PRACTITIONER

## 2018-05-30 RX ORDER — OXYCODONE HYDROCHLORIDE 10 MG/1
10 TABLET ORAL EVERY 4 HOURS PRN
Qty: 90 TAB | Refills: 0 | Status: SHIPPED | OUTPATIENT
Start: 2018-07-21 | End: 2018-08-07 | Stop reason: SDUPTHER

## 2018-05-30 RX ORDER — OXYCODONE HYDROCHLORIDE 10 MG/1
10 TABLET ORAL EVERY 4 HOURS PRN
Qty: 90 TAB | Refills: 0 | Status: SHIPPED | OUTPATIENT
Start: 2018-06-20 | End: 2018-05-30 | Stop reason: SDUPTHER

## 2018-05-30 NOTE — ASSESSMENT & PLAN NOTE
This is a chronic health problem that is fairly well controlled with current medications and lifestyle measures.  Patient takes oxycodone, flexeril, and diclofenac gel with fair amount of relief.  She is becoming weaker with ambulation, using a cane.  Will refer to Home Health physical therapy.

## 2018-05-30 NOTE — PROGRESS NOTES
CC:  Chronic pain management, insomnia, hypertension, wound evaluation    HISTORY OF THE PRESENT ILLNESS: Patient is a 67 y.o. female. This pleasant patient is here today for chronic pain management of low back and multiple joints, insomnia, hypertension, and right upper thigh wound evaluation.      Insomnia  This is a chronic health problem that is uncontrolled with current medications and lifestyle measures.  She has been taking trazodone 300 mg at bedtime, but it is not working as well as it used to.  Patient reports hard time falling asleep and hard time going back to sleep if wakes in middle of night.  Will increase to 450 mg a night.  Patient not a good candidate for Ambien secondary to chronic opioids and respiratory failure.  Patient will follow up with primary care provider at next scheduled appointment on 7/5/18.        Skin ulcer, limited to breakdown of skin (HCC)  Patient has had chronic ulcer on right upper thigh for past few months.  [facility] was administering wound care.  Patient is here today to have been examined as wound care reports her wound is completely healed.  Upon examination she has a mild pink scar discoloration, intact, no erythema or swelling.    Chronic use of opiate drugs therapeutic purposes  Chronic pain recheck:  Last dose of controlled substance: last night  Chronic pain treated with oxycodone 10 mg taken 2-3 times a day    She  reports that she does not drink alcohol.  She  reports that she does not use drugs.    Consequences of Chronic Opiate therapy:  (5 A's)  Analgesia: Compared to no treatment or prior treatment, pain is currently improved  Activity: improved  Adverse Events: She denies dry mouth and sedation Positive for dry mouth.  Aberrant Behaviors: She reports she is taking medication as prescribed and is not veering from agreed treatment regimen. There have been no inappropriate refills or lost/stolen meds reported.   Affect/Mood: Pain is not impacting  patient's mood.  Patient denies depression/anxiety.    Nonnarcotic treatments that are being used: muscle relaxers and flexeril and diclofenac.   Treatment goals discussed.    Opioid Risk Score: 8    Interpretation of Opioid Risk Score   Score 0-3 = Low risk of abuse. Do UDS at least once per year.  Score 4-7 = Moderate risk of abuse. Do UDS 1-4 times per year.  Score 8+ = High risk of abuse. Refer to specialist.    Last order of CONTROLLED SUBSTANCE TREATMENT AGREEMENT was found on 6/7/2016 from Office Visit on 6/7/2016     UDS Summary                URINE DRUG SCREEN Overdue 6/2/2017      Done 6/7/2016 URINE DRUG SCREEN        I could not find a previous UDS report.  UDS was obtained today.    I have reviewed the medical records, the Prescription Monitoring Program and I have determined that controlled substance treatment is medically indicated.      Chronic hypertension  This is a chronic health problem that is well controlled with current medications and lifestyle measures. Patient takes amlodipine and lisinopril.  Patient brings in blood pressure readings from before and after taking medication.  Ranging from 133-144/69-73 prior to medication and 117-135/60-81 half an hour to an hour after medication.  Denies lightheadedness.  The patient denies chest pain.  Positive for dyspnea on exertion.    Chronic pain of right knee  Patient reports chronic bilateral knee pain, right worse than left.  She received steroid injection 2 months ago from Dr. Mccall.  Patient reports some relief.  She also takes oxycodone for pain management.    Lumbar spondylosis with myelopathy  This is a chronic health problem that is fairly well controlled with current medications and lifestyle measures.  Patient takes oxycodone, flexeril, and diclofenac gel with fair amount of relief.  She is becoming weaker with ambulation, using a cane.  Will refer to Home Health physical therapy.      Allergies: Vitamin c; Keflex; Codeine; Gabapentin;  Lyrica; and Sudafed    Current Outpatient Prescriptions Ordered in [facility]   Medication Sig Dispense Refill   • varenicline (CHANTIX STARTING MONTH LUANN) 0.5 MG X 11 & 1 MG X 42 tablet Take 0.5 mg daily for 3 days, then twice a day for 4 days then 1 mg tablet twice a day 56 Tab 3   • [START ON 7/21/2018] oxyCODONE immediate release (ROXICODONE) 10 MG immediate release tablet Take 1 Tab by mouth every four hours as needed for Moderate Pain for up to 30 days. 90 Tab 0   • DULoxetine (CYMBALTA) 60 MG Cap DR Particles delayed-release capsule Take 1 Cap by mouth every day. 90 Cap 3   • benzonatate (TESSALON) 100 MG Cap Take 1 Cap by mouth 3 times a day as needed for Cough. 60 Cap 0   • SPIRIVA HANDIHALER 18 MCG Cap INHALE 1 CAPSULE VIA HANDIHALER BY MOUTH DAILY 90 Cap 0   • lisinopril (PRINIVIL, ZESTRIL) 40 MG tablet TAKE 1 TABLET BY MOUTH DAILY 90 Tab 0   • LANTUS SOLOSTAR 100 UNIT/ML Solution Pen-injector injection INJECT 5-20 UNITS SUBCUTANEOUSLY EVERY EVENING 15 mL 0   • levothyroxine (SYNTHROID) 75 MCG Tab TAKE 1 TABLET BY MOUTH DAILY 90 Tab 1   • Diclofenac Sodium 1 % Gel Apply  to skin as directed.     • cyclobenzaprine (FLEXERIL) 10 MG Tab TAKE 1 TABLET BY MOUTH TWO TIMES DAILY AS NEEDED 30 Tab 0   • hydrochlorothiazide (HYDRODIURIL) 12.5 MG tablet Take 1 Tab by mouth every day. 30 Tab 3   • potassium chloride SA (K-DUR) 10 MEQ Tab CR Take 1 Tab by mouth every day. 30 Tab 3   • budesonide-formoterol (SYMBICORT) 160-4.5 MCG/ACT Aerosol Inhale 2 Puffs by mouth 2 Times a Day. 1 Inhaler 11   • amlodipine (NORVASC) 10 MG Tab Take 1 Tab by mouth every day. 90 Tab 1   • traZODone (DESYREL) 150 MG Tab TAKE 2 TABLETS BY MOUTH NIGHTLY AT BEDTIME 180 Tab 0   • famotidine (PEPCID) 20 MG Tab Take 1 Tab by mouth 2 times a day. 180 Tab 3   • lactulose 10 GM/15ML Solution Take 30 mL by mouth 2 times a day. 1 Bottle 3     No current Epic-ordered facility-administered medications on file.        Past Medical History:   Diagnosis Date  "  • Arthritis     \"everywhere\"   • ASTHMA    • Backpain     chronic back pain   • Breath shortness     \"only with flare up with allergies\"   • Bronchitis     as a child   • Congestive heart failure (HCC) 2013    related to pulmonary failure   • COPD    • Diabetes     Type 2   • Disorder of thyroid     Hypothyroid   • Fall    • Fibromyalgia    • GERD (gastroesophageal reflux disease)    • Heart burn    • Hepatitis C     \"I have markers in blood but not active\"   • Hypertension    • Indigestion    • Infectious disease     Hepatitis C   • Neuropathy (HCC)     bilat feet   • On home oxygen therapy    • Other specified symptom associated with female genital organs     Hysterectomy at age 22yrs   • Pain 9/13/2016    \"pain everywhere\"   • PND (post-nasal drip)    • Pneumonia     as a child   • Psychiatric problem 9/13/2016    PTSD   • RLS (restless legs syndrome)    • Sleep apnea     does not wear CPAP, \"can't do it\"   • Unspecified urinary incontinence        Past Surgical History:   Procedure Laterality Date   • ANKLE ORIF Left 5/8/2017    Procedure: ANKLE ORIF;  Surgeon: Rico Gtz M.D.;  Location: [facility];  Service:    • OH DSTR NROLYTC AGNT PARVERTEB FCT SNGL LMBR/SACRAL Left 9/16/2016    Procedure: NEURO DEST FACET L/S W/IG SNGL - L3-S1;  Surgeon: Xavier Arteaga D.O.;  Location: [facility];  Service: Pain Management   • OH DSTR NROLYTC AGNT PARVERTEB FCT ADDL LMBR/SACRAL  9/16/2016    Procedure: NEURO DEST FACET L/S W/IG ADDL;  Surgeon: Xavier Arteaga D.O.;  Location: [facility];  Service: Pain Management   • OH DSTR NROLYTC AGNT PARVERTEB FCT ADDL LMBR/SACRAL  9/16/2016    Procedure: NEURO DEST FACET L/S W/IG ADDL;  Surgeon: Xavier Arteaga D.O.;  Location: [facility];  Service: Pain Management   • PB FLUOROSCOPIC GUIDANCE NEEDLE PLACEMENT  9/16/2016    Procedure: FLOURO GUIDE NEEDLE PLACEMENT;  Surgeon: Xavier Arteaga D.O.;  " Location: SURGERY [facility];  Service: Pain Management   • AL DSTR NROLYTC AGNT PARVERTEB FCT SNGL LMBR/SACRAL Right 11/6/2015    Procedure: NEURO DEST FACET L/S W/IG SNGL - L3-S1;  Surgeon: Xavier Arteaga D.O.;  Location: [facility];  Service: Pain Management   • AL DSTR NROLYTC AGNT PARVERTEB FCT ADDL LMBR/SACRAL  11/6/2015    Procedure: NEURO DEST FACET L/S W/IG ADDL;  Surgeon: Xavier Arteaga D.O.;  Location: SURGERY [facility];  Service: Pain Management   • PB INJECT RX OTHER PERIPH NERVE  11/6/2015    Procedure: NEUROLYTIC DEST-OTHER NERVE;  Surgeon: Xavier Arteaga D.O.;  Location: SURGERY [facility];  Service: Pain Management   • AL DSTR NROLYTC AGNT PARVERTEB FCT ADDL LMBR/SACRAL  11/6/2015    Procedure: NEURO DEST FACET L/S W/IG ADDL;  Surgeon: Xavier Arteaga D.O.;  Location: SURGERY [facility];  Service: Pain Management   • AL DSTR NROLYTC AGNT PARVERTEB FCT SNGL LMBR/SACRAL Left 10/2/2015    Procedure: NEURO DEST FACET L/S W/IG SNGL - L3-S1;  Surgeon: Xavier Arteaga D.O.;  Location: [facility];  Service: Pain Management   • AL DSTR NROLYTC AGNT PARVERTEB FCT ADDL LMBR/SACRAL  10/2/2015    Procedure: NEURO DEST FACET L/S W/IG ADDL;  Surgeon: Xavier Arteaga D.O.;  Location: SURGERY [facility];  Service: Pain Management   • PB INJECT RX OTHER PERIPH NERVE  10/2/2015    Procedure: NEUROLYTIC DEST-OTHER NERVE;  Surgeon: Xavier Arteaga D.O.;  Location: SURGERY [facility];  Service: Pain Management   • AL DSTR NROLYTC AGNT PARVERTEB FCT ADDL LMBR/SACRAL  10/2/2015    Procedure: NEURO DEST FACET L/S W/IG ADDL;  Surgeon: Xavier Arteaga D.O.;  Location: SURGERY [facility];  Service: Pain Management   • PB INJ DX/THER AGNT PARAVERT FACET JOINT, OBED* Left 7/17/2015    Procedure: INJ PARA FACET L/S 1 LVL W/IG - L3-S1;  Surgeon: Xavier Arteaga D.O.;  Location: [facility];  Service: Pain  Management   • PB INJ DX/THER AGNT PARAVERT FACET JOINT, OBED*  7/17/2015    Procedure: INJ PARA FACET L/S 2D LVL W/IG;  Surgeon: Xavier Arteaga D.O.;  Location: SURGERY [facility];  Service: Pain Management   • PB INJ DX/THER AGNT PARAVERT FACET JOINT, OBED*  7/17/2015    Procedure: INJ PARA FACET L/S 3D LVL W/IG;  Surgeon: Xavier Arteaga D.O.;  Location: SURGERY SURGICAL [facility];  Service: Pain Management   • PB INJECT NERV BLCK,OTHR PERIPH NERV  7/17/2015    Procedure: INJ-ANES AGENT-OTHER PHER.NRVE;  Surgeon: Xavier Arteaga D.O.;  Location: SURGERY [facility];  Service: Pain Management   • PB INJ DX/THER AGNT PARAVERT FACET JOINT, OBED* Left 7/10/2015    Procedure: INJ PARA FACET L/S 1 LVL W/IG - L3-S1;  Surgeon: Xaiver Arteaga D.O.;  Location: SURGERY [facility];  Service: Pain Management   • PB INJ DX/THER AGNT PARAVERT FACET JOINT, OBED*  7/10/2015    Procedure: INJ PARA FACET L/S 2D LVL W/IG;  Surgeon: Xavier Arteaga D.O.;  Location: SURGERY [facility];  Service: Pain Management   • PB INJ DX/THER AGNT PARAVERT FACET JOINT, OBED*  7/10/2015    Procedure: INJ PARA FACET L/S 3D LVL W/IG;  Surgeon: Xavier Arteaga D.O.;  Location: SURGERY [facility];  Service: Pain Management   • PB INJECT NERV BLCK,OTHR PERIPH NERV  7/10/2015    Procedure: INJ-ANES AGENT-OTHER PHER.NRVE;  Surgeon: Xavier Arteaga D.O.;  Location: SURGERY SURGICAL [facility];  Service: Pain Management   • GYN SURGERY      hysterectomy    • LUMPECTOMY Left     Left breast   • OTHER ORTHOPEDIC SURGERY      L knee replacement    • OTHER ORTHOPEDIC SURGERY      R carpal tunnel    • US-NEEDLE CORE BX-BREAST PANEL         Social History   Substance Use Topics   • Smoking status: Current Every Day Smoker     Packs/day: 0.75     Years: 50.00     Types: Cigarettes     Last attempt to quit: 3/12/2018   • Smokeless tobacco: Never Used   • Alcohol use No       Family History   Problem Relation Age of Onset  "  • Lung Disease Mother    • Alcohol/Drug Mother    • Heart Disease Mother    • Cancer Father    • Cancer Brother    • Diabetes Maternal Grandmother    • Stroke Paternal Grandmother        ROS:     - Constitutional: Negative for fever.       - Respiratory: Negative for cough. Positive dyspnea with exertion (not new), supplemental oxygen at 3L/min.      - Cardiovascular: Negative for chest pain.       - Musculoskeletal: As in HPI     - Skin: As in HPI         Exam: Blood pressure 108/60, pulse 71, temperature 36.7 °C (98.1 °F), resp. rate 16, height 1.651 m (5' 5\"), weight 94.2 kg (207 lb 9.6 oz), SpO2 94 %. Body mass index is 34.55 kg/m².    General: Alert, pleasant,obese habitus, well developed female in NAD  HEENT: Normocephalic. Eyes conjunctiva clear lids without ptosis, pupils equal and reactive to light, ears normal shape and contour, canals are clear bilaterally, tympanic membranes are pearly gray with good light reflex.   Neck: Supple without bruit. Thyroid is not enlarged.  Pulmonary: Clear to ausculation.  Normal effort at rest. Dyspneic with ambulation. No rales, ronchi, or wheezing.  Cardiovascular: Normal rate and rhythm without murmur.  No lower extremity edema.  Lymph: No cervical or supraclavicular lymph nodes are palpable  Skin: Warm and dry.  No obvious lesions.  Musculoskeletal: antalgic gait with cane.   Psych: Normal mood and affect. Alert and oriented. Judgment and insight is normal.    Please note that this dictation was created using voice recognition software. I have made every reasonable attempt to correct obvious errors, but I expect that there are errors of grammar and possibly content that I did not discover before finalizing the note.      Assessment/Plan    1. Primary osteoarthritis involving multiple joints  Patient will continue with oxycodone, flexeril, diclofenac gel as needed.  Will start Home Health PT.  Patient reports that she has been referred to rheumatologist by hematologist " for anemia of uncertain cause.  - oxyCODONE immediate release (ROXICODONE) 10 MG immediate release tablet; Take 1 Tab by mouth every four hours as needed for Moderate Pain for up to 30 days.  Dispense: 90 Tab; Refill: 0  - REFERRAL TO HOME HEALTH    3. Primary insomnia  Patient will trial increase in trazodone to 450 mg at night.    4. Chronic use of opiate drugs therapeutic purposes  Two months of oxycodone prescribed  This patient is continuing to use a controlled substance (CS) on a long term basis.  The patient is thoroughly aware of the goals of treatment with the CS  The patient is aware that yearly and random urine drug screens are required.  The patient has been instructed to take the CS only as prescribed.  The patient is prohibited from sharing the CS with any other person.  The patient is instructed to inform the provider if any other CS is taken, of any alcohol or cannabis or other recreational drug use, any treatment for side effects of the CS or complications, if they have CS active rx in other states  The patient has evidence for a reason for the CS  The treatment plan has been discussed with the patient  The  report has been reviewed    - [facility] PAIN MANAGEMENT SCREEN; Future  - CONSENT FOR OPIATE PRESCRIPTION    5. Cigarette nicotine dependence without complication  Patient reports she has started smoking again and would like to try Chantix again.  - varenicline (CHANTIX STARTING MONTH LUANN) 0.5 MG X 11 & 1 MG X 42 tablet; Take 0.5 mg daily for 3 days, then twice a day for 4 days then 1 mg tablet twice a day  Dispense: 56 Tab; Refill: 3    6. Skin ulcer, limited to breakdown of skin (HCC)  Healed.  Wound care completed.    7. Chronic hypertension  Continue with medications.    8. Chronic pain of right knee  Continue with medications as needed and PT.    9. Lumbar spondylosis with myelopathy  - REFERRAL TO HOME HEALTH    Patient will return to clinic as previously scheduled with primary care  provider, Dr. Mccall on 7/5/18.

## 2018-05-30 NOTE — ASSESSMENT & PLAN NOTE
This is a chronic health problem that is uncontrolled with current medications and lifestyle measures.  She has been taking trazodone 300 mg at bedtime, but it is not working as well as it used to.  Patient reports hard time falling asleep and hard time going back to sleep if wakes in middle of night.  Will increase to 450 mg a night.  Patient not a good candidate for Ambien secondary to chronic opioids and respiratory failure.  Patient will follow up with primary care provider at next scheduled appointment on 7/5/18.

## 2018-05-30 NOTE — ASSESSMENT & PLAN NOTE
Chronic pain recheck:  Last dose of controlled substance: last night  Chronic pain treated with oxycodone 10 mg taken 2-3 times a day    She  reports that she does not drink alcohol.  She  reports that she does not use drugs.    Consequences of Chronic Opiate therapy:  (5 A's)  Analgesia: Compared to no treatment or prior treatment, pain is currently improved  Activity: improved  Adverse Events: She denies dry mouth and sedation Positive for dry mouth.  Aberrant Behaviors: She reports she is taking medication as prescribed and is not veering from agreed treatment regimen. There have been no inappropriate refills or lost/stolen meds reported.   Affect/Mood: Pain is not impacting patient's mood.  Patient denies depression/anxiety.    Nonnarcotic treatments that are being used: muscle relaxers and flexeril and diclofenac.   Treatment goals discussed.    Opioid Risk Score: 8    Interpretation of Opioid Risk Score   Score 0-3 = Low risk of abuse. Do UDS at least once per year.  Score 4-7 = Moderate risk of abuse. Do UDS 1-4 times per year.  Score 8+ = High risk of abuse. Refer to specialist.    Last order of CONTROLLED SUBSTANCE TREATMENT AGREEMENT was found on 6/7/2016 from Office Visit on 6/7/2016     UDS Summary                URINE DRUG SCREEN Overdue 6/2/2017      Done 6/7/2016 URINE DRUG SCREEN        I could not find a previous UDS report.  UDS was obtained today.    I have reviewed the medical records, the Prescription Monitoring Program and I have determined that controlled substance treatment is medically indicated.

## 2018-05-30 NOTE — ASSESSMENT & PLAN NOTE
This is a chronic health problem that is well controlled with current medications and lifestyle measures. Patient takes amlodipine and lisinopril.  Patient brings in blood pressure readings from before and after taking medication.  Ranging from 133-144/69-73 prior to medication and 117-135/60-81 half an hour to an hour after medication.  Denies lightheadedness.  The patient denies chest pain.  Positive for dyspnea on exertion.

## 2018-05-30 NOTE — ASSESSMENT & PLAN NOTE
Patient has had chronic ulcer on right upper thigh for past few months.  [facility] was administering wound care.  Patient is here today to have been examined as wound care reports her wound is completely healed.  Upon examination she has a mild pink scar discoloration, intact, no erythema or swelling.

## 2018-05-30 NOTE — ASSESSMENT & PLAN NOTE
Patient reports chronic bilateral knee pain, right worse than left.  She received steroid injection 2 months ago from Dr. Mccall.  Patient reports some relief.  She also takes oxycodone for pain management.

## 2018-06-05 DIAGNOSIS — N18.4 STAGE 4 CHRONIC KIDNEY DISEASE (HCC): ICD-10-CM

## 2018-06-06 NOTE — PROGRESS NOTES
Please advise patient that I reviewed lab results. Hepatitis C is detected but no virus is found so this is an old infection. Her kidney function is quite low I've ordered a referral to a kidney specialist. it is very important to see the kidney specialist to try to improve the kidney function. Continue with good hydration with 6-8 glasses of water daily, avoid high salt/sodium foods/drinks. Avoid NSAIDS. Kavitha Mccall M.D.

## 2018-06-08 ENCOUNTER — TELEPHONE (OUTPATIENT)
Dept: MEDICAL GROUP | Facility: PHYSICIAN GROUP | Age: 68
End: 2018-06-08

## 2018-06-08 NOTE — TELEPHONE ENCOUNTER
1. Caller Name: Allison Shaw                                         Call Back Number: [phone]      Patient approves a detailed voicemail message: N\A    Allison- called to inform patient was evaluated this week, PT will resume twice a week for 6 weeks. Just FYI.

## 2018-06-21 ENCOUNTER — TELEPHONE (OUTPATIENT)
Dept: MEDICAL GROUP | Facility: PHYSICIAN GROUP | Age: 68
End: 2018-06-21

## 2018-06-21 NOTE — TELEPHONE ENCOUNTER
VOICEMAIL  1. Caller Name: Priya Carmel Sampson                        Call Back Number: [phone] (home)     2. Message: Priya informing that Medicare will be sending orders for back brace. She did requeest this be filled out and faxed back    3. Patient approves office to leave a detailed voicemail/MyChart message: N\A

## 2018-06-22 ENCOUNTER — TELEPHONE (OUTPATIENT)
Dept: MEDICAL GROUP | Facility: PHYSICIAN GROUP | Age: 68
End: 2018-06-22

## 2018-06-22 NOTE — TELEPHONE ENCOUNTER
Jamaal with mail order pharmacy [phone] REF # [id number], requesting refill information.  Fax number given to Jamaal as unable to confirm where he is calling from.  Once fax received, will proceed with refill

## 2018-06-25 ENCOUNTER — TELEPHONE (OUTPATIENT)
Dept: MEDICAL GROUP | Facility: PHYSICIAN GROUP | Age: 68
End: 2018-06-25

## 2018-06-25 NOTE — TELEPHONE ENCOUNTER
1. Caller Name: Alen                                         Call Back Number: [place]      Patient approves a detailed voicemail message: N\A    2. SPECIFIC Action To Be Taken:Orders    3. Diagnosis/Clinical Reason for Request: Ankle Brace    4. Specialty & Provider Name/Lab/Imaging Location: [facility] / Faxing order for Dr Mccall to sign. Priya in agreement for this brace    5. Is appointment scheduled for requested order/referral: no

## 2018-07-02 ENCOUNTER — TELEPHONE (OUTPATIENT)
Dept: MEDICAL GROUP | Facility: PHYSICIAN GROUP | Age: 68
End: 2018-07-02

## 2018-07-02 NOTE — TELEPHONE ENCOUNTER
Helena from [facility] called and stated that Priya has not had a bowel movement for 3 days. They would like to know if she should take more lactulose of go to UC/ER. She stated that she is very uncomfortable and has a fistula. Please advise.

## 2018-07-02 NOTE — TELEPHONE ENCOUNTER
[facility]

## 2018-07-02 NOTE — TELEPHONE ENCOUNTER
She can gradually increase the amount of lactulose till she has a bowel movement.   Kavitha Mccall M.D.

## 2018-07-02 NOTE — TELEPHONE ENCOUNTER
Patient notified and she stated that the problem is not that she is constipated. She explained that she is feeling the need to have a bowel movement. She stated that she goes to release her stool and it does not come out her rectum. She stated that it goes under her skin.

## 2018-07-03 ENCOUNTER — APPOINTMENT (OUTPATIENT)
Dept: RADIOLOGY | Facility: MEDICAL CENTER | Age: 68
End: 2018-07-03
Attending: EMERGENCY MEDICINE
Payer: MEDICARE

## 2018-07-03 ENCOUNTER — HOSPITAL ENCOUNTER (EMERGENCY)
Facility: MEDICAL CENTER | Age: 68
End: 2018-07-03
Attending: EMERGENCY MEDICINE
Payer: MEDICARE

## 2018-07-03 VITALS
BODY MASS INDEX: 33.87 KG/M2 | OXYGEN SATURATION: 99 % | SYSTOLIC BLOOD PRESSURE: 102 MMHG | DIASTOLIC BLOOD PRESSURE: 55 MMHG | WEIGHT: 203.26 LBS | HEART RATE: 56 BPM | HEIGHT: 65 IN | RESPIRATION RATE: 17 BRPM | TEMPERATURE: 97.1 F

## 2018-07-03 DIAGNOSIS — K62.89 PERIANAL PAIN: ICD-10-CM

## 2018-07-03 LAB
ANION GAP SERPL CALC-SCNC: 7 MMOL/L (ref 0–11.9)
BASOPHILS # BLD AUTO: 0.5 % (ref 0–1.8)
BASOPHILS # BLD: 0.05 K/UL (ref 0–0.12)
BUN SERPL-MCNC: 44 MG/DL (ref 8–22)
CALCIUM SERPL-MCNC: 10.2 MG/DL (ref 8.5–10.5)
CHLORIDE SERPL-SCNC: 104 MMOL/L (ref 96–112)
CO2 SERPL-SCNC: 22 MMOL/L (ref 20–33)
CREAT SERPL-MCNC: 1.85 MG/DL (ref 0.5–1.4)
EOSINOPHIL # BLD AUTO: 0.12 K/UL (ref 0–0.51)
EOSINOPHIL NFR BLD: 1.2 % (ref 0–6.9)
ERYTHROCYTE [DISTWIDTH] IN BLOOD BY AUTOMATED COUNT: 43.2 FL (ref 35.9–50)
GLUCOSE SERPL-MCNC: 126 MG/DL (ref 65–99)
HCT VFR BLD AUTO: 32.4 % (ref 37–47)
HGB BLD-MCNC: 10.8 G/DL (ref 12–16)
IMM GRANULOCYTES # BLD AUTO: 0.11 K/UL (ref 0–0.11)
IMM GRANULOCYTES NFR BLD AUTO: 1.1 % (ref 0–0.9)
LYMPHOCYTES # BLD AUTO: 1.35 K/UL (ref 1–4.8)
LYMPHOCYTES NFR BLD: 13.2 % (ref 22–41)
MCH RBC QN AUTO: 32.2 PG (ref 27–33)
MCHC RBC AUTO-ENTMCNC: 33.3 G/DL (ref 33.6–35)
MCV RBC AUTO: 96.7 FL (ref 81.4–97.8)
MONOCYTES # BLD AUTO: 0.63 K/UL (ref 0–0.85)
MONOCYTES NFR BLD AUTO: 6.1 % (ref 0–13.4)
NEUTROPHILS # BLD AUTO: 7.99 K/UL (ref 2–7.15)
NEUTROPHILS NFR BLD: 77.9 % (ref 44–72)
NRBC # BLD AUTO: 0 K/UL
NRBC BLD-RTO: 0 /100 WBC
PLATELET # BLD AUTO: 240 K/UL (ref 164–446)
PMV BLD AUTO: 9.1 FL (ref 9–12.9)
POTASSIUM SERPL-SCNC: 6.1 MMOL/L (ref 3.6–5.5)
RBC # BLD AUTO: 3.35 M/UL (ref 4.2–5.4)
SODIUM SERPL-SCNC: 133 MMOL/L (ref 135–145)
WBC # BLD AUTO: 10.3 K/UL (ref 4.8–10.8)

## 2018-07-03 PROCEDURE — 71045 X-RAY EXAM CHEST 1 VIEW: CPT

## 2018-07-03 PROCEDURE — 700117 HCHG RX CONTRAST REV CODE 255: Performed by: EMERGENCY MEDICINE

## 2018-07-03 PROCEDURE — 72192 CT PELVIS W/O DYE: CPT

## 2018-07-03 PROCEDURE — 85025 COMPLETE CBC W/AUTO DIFF WBC: CPT

## 2018-07-03 PROCEDURE — A9270 NON-COVERED ITEM OR SERVICE: HCPCS | Performed by: EMERGENCY MEDICINE

## 2018-07-03 PROCEDURE — 80048 BASIC METABOLIC PNL TOTAL CA: CPT

## 2018-07-03 PROCEDURE — 99284 EMERGENCY DEPT VISIT MOD MDM: CPT

## 2018-07-03 PROCEDURE — 700102 HCHG RX REV CODE 250 W/ 637 OVERRIDE(OP): Performed by: EMERGENCY MEDICINE

## 2018-07-03 RX ORDER — OXYCODONE HYDROCHLORIDE 5 MG/1
10 TABLET ORAL ONCE
Status: COMPLETED | OUTPATIENT
Start: 2018-07-03 | End: 2018-07-03

## 2018-07-03 RX ADMIN — IOHEXOL 100 ML: 240 INJECTION, SOLUTION INTRATHECAL; INTRAVASCULAR; INTRAVENOUS; ORAL at 16:28

## 2018-07-03 RX ADMIN — OXYCODONE HYDROCHLORIDE 10 MG: 5 TABLET ORAL at 15:25

## 2018-07-03 ASSESSMENT — PAIN SCALES - GENERAL: PAINLEVEL_OUTOF10: 8

## 2018-07-03 NOTE — DISCHARGE INSTRUCTIONS
Follow-up with gastroenterology if pain continues more than 3-4 days.  Return for red painful swelling, fever, abdominal pain or severe pain with bowel movement.

## 2018-07-03 NOTE — ED TRIAGE NOTES
"Priya Callahan  Chief Complaint   Patient presents with   • Other     pt c/o \"an anal fistula collecting stool\" d/t 4 days of constipation     Pt w/c to triage with above complaint. PT states last BM was 1 wk ago and unable to evacuate bowels. Pt states she took lactulose to assist in BM and now states, \"I have a rip in my butt hole that's collecting stool instead of coming out my butt.\" Pt admits to taking opioid medications, denies taking stool softeners with medication.     /90   Pulse 96   Temp 36.2 °C (97.1 °F)   Resp 18   Ht 1.651 m (5' 5\")   Wt 92.2 kg (203 lb 4.2 oz)   SpO2 98%   BMI 33.82 kg/m²     Pt informed of triage process and encouraged to notify staff of any changes or concerns. Pt verbalized understanding of instructions. Apologized for long wait time. Pt placed back in lobby.     "

## 2018-07-03 NOTE — ED PROVIDER NOTES
"ED Provider Note    CHIEF COMPLAINT  Chief Complaint   Patient presents with   • Other     pt c/o \"an anal fistula collecting stool\" d/t 4 days of constipation       HPI  Priya Callahan is a 67 y.o. female who presents for perianal pain. Patient believes there is a perianal pouch under the skin in the anal area that fills with stool when she has a bowel movement. The area is painful when she has a bowel movement or sits on it. Severity pain 8 of 10. History of constipation recently treated successfully with lactulose. She describes this problem as a fistula but she's never had a true fistula. She does have diabetes. Denies fever or flulike symptoms although she has some nausea.  No abdominal pain.    REVIEW OF SYSTEMS  Pertinent positives include: Perianal pain, swelling, diarrhea, diabetes, nausea. Hemoptysis once last week  Pertinent negatives include: Abdominal pain, vomiting. TB or known TB exposure  10+ systems reviewed and negative.      PAST MEDICAL HISTORY  Past Medical History:   Diagnosis Date   • Arthritis     \"everywhere\"   • ASTHMA    • Backpain     chronic back pain   • Breath shortness     \"only with flare up with allergies\"   • Bronchitis     as a child   • Congestive heart failure (HCC) 2013    related to pulmonary failure   • COPD    • Diabetes     Type 2   • Disorder of thyroid     Hypothyroid   • Fall    • Fibromyalgia    • GERD (gastroesophageal reflux disease)    • Heart burn    • Hepatitis C     \"I have markers in blood but not active\"   • Hypertension    • Indigestion    • Infectious disease     Hepatitis C   • Neuropathy (HCC)     bilat feet   • On home oxygen therapy    • Other specified symptom associated with female genital organs     Hysterectomy at age 22yrs   • Pain 9/13/2016    \"pain everywhere\"   • PND (post-nasal drip)    • Pneumonia     as a child   • Psychiatric problem 9/13/2016    PTSD   • RLS (restless legs syndrome)    • Sleep apnea     does not wear CPAP, \"can't do " "it\"   • Unspecified urinary incontinence        FAMILY HISTORY  Family History   Problem Relation Age of Onset   • Lung Disease Mother    • Alcohol/Drug Mother    • Heart Disease Mother    • Cancer Father    • Cancer Brother    • Diabetes Maternal Grandmother    • Stroke Paternal Grandmother        SOCIAL HISTORY  Social History   Substance Use Topics   • Smoking status: Current Every Day Smoker     Packs/day: 0.75     Years: 50.00     Types: Cigarettes     Last attempt to quit: 3/12/2018   • Smokeless tobacco: Never Used   • Alcohol use No     History   Drug Use No       SURGICAL HISTORY  Past Surgical History:   Procedure Laterality Date   • ANKLE ORIF Left 5/8/2017    Procedure: ANKLE ORIF;  Surgeon: Rico Gtz M.D.;  Location: [facility];  Service:    • NE DSTR NROLYTC AGNT PARVERTEB FCT SNGL LMBR/SACRAL Left 9/16/2016    Procedure: NEURO DEST FACET L/S W/IG SNGL - L3-S1;  Surgeon: Xavier Arteaga D.O.;  Location: SURGERY [facility];  Service: Pain Management   • NE DSTR NROLYTC AGNT PARVERTEB FCT ADDL LMBR/SACRAL  9/16/2016    Procedure: NEURO DEST FACET L/S W/IG ADDL;  Surgeon: Xavier Arteaga D.O.;  Location: SURGERY [facility];  Service: Pain Management   • NE DSTR NROLYTC AGNT PARVERTEB FCT ADDL LMBR/SACRAL  9/16/2016    Procedure: NEURO DEST FACET L/S W/IG ADDL;  Surgeon: Xavier Arteaga D.O.;  Location: SURGERY [facility];  Service: Pain Management   • PB FLUOROSCOPIC GUIDANCE NEEDLE PLACEMENT  9/16/2016    Procedure: FLOURO GUIDE NEEDLE PLACEMENT;  Surgeon: Xavier Artegaa D.O.;  Location: SURGERY SURGICAL [facility];  Service: Pain Management   • NE DSTR NROLYTC AGNT PARVERTEB FCT SNGL LMBR/SACRAL Right 11/6/2015    Procedure: NEURO DEST FACET L/S W/IG SNGL - L3-S1;  Surgeon: Xavier Arteaga D.O.;  Location: SURGERY SURGICAL [facility];  Service: Pain Management   • NE DSTR NROLYTC AGNT PARVERTEB FCT ADDL LMBR/SACRAL  11/6/2015    Procedure: NEURO " DEST FACET L/S W/IG ADDL;  Surgeon: Xavier Arteaga D.O.;  Location: SURGERY [facility];  Service: Pain Management   • PB INJECT RX OTHER PERIPH NERVE  11/6/2015    Procedure: NEUROLYTIC DEST-OTHER NERVE;  Surgeon: Xavier Arteaga D.O.;  Location: SURGERY [facility];  Service: Pain Management   • SC DSTR NROLYTC AGNT PARVERTEB FCT ADDL LMBR/SACRAL  11/6/2015    Procedure: NEURO DEST FACET L/S W/IG ADDL;  Surgeon: Xavier Arteaga D.O.;  Location: SURGERY [facility];  Service: Pain Management   • SC DSTR NROLYTC AGNT PARVERTEB FCT SNGL LMBR/SACRAL Left 10/2/2015    Procedure: NEURO DEST FACET L/S W/IG SNGL - L3-S1;  Surgeon: Xavier Arteaga D.O.;  Location: SURGERY [facility];  Service: Pain Management   • SC DSTR NROLYTC AGNT PARVERTEB FCT ADDL LMBR/SACRAL  10/2/2015    Procedure: NEURO DEST FACET L/S W/IG ADDL;  Surgeon: Xavier Arteaga D.O.;  Location: SURGERY [facility];  Service: Pain Management   • PB INJECT RX OTHER PERIPH NERVE  10/2/2015    Procedure: NEUROLYTIC DEST-OTHER NERVE;  Surgeon: Xavier Arteaga D.O.;  Location: SURGERY [facility];  Service: Pain Management   • SC DSTR NROLYTC AGNT PARVERTEB FCT ADDL LMBR/SACRAL  10/2/2015    Procedure: NEURO DEST FACET L/S W/IG ADDL;  Surgeon: Xavier Arteaga D.O.;  Location: SURGERY [facility];  Service: Pain Management   • PB INJ DX/THER AGNT PARAVERT FACET JOINT, OBED* Left 7/17/2015    Procedure: INJ PARA FACET L/S 1 LVL W/IG - L3-S1;  Surgeon: Xavier Arteaga D.O.;  Location: SURGERY [facility];  Service: Pain Management   • PB INJ DX/THER AGNT PARAVERT FACET JOINT, OBED*  7/17/2015    Procedure: INJ PARA FACET L/S 2D LVL W/IG;  Surgeon: Xavier Arteaga D.O.;  Location: SURGERY SURGICAL [facility];  Service: Pain Management   • PB INJ DX/THER AGNT PARAVERT FACET JOINT, OBED*  7/17/2015    Procedure: INJ PARA FACET L/S 3D LVL W/IG;  Surgeon: Xavier Arteaga D.O.;  Location: SURGERY  [facility];  Service: Pain Management   • PB INJECT NERV BLCK,OTHR PERIPH NERV  7/17/2015    Procedure: INJ-ANES AGENT-OTHER PHER.NRVE;  Surgeon: Xavier Arteaga D.O.;  Location: [facility];  Service: Pain Management   • PB INJ DX/THER AGNT PARAVERT FACET JOINT, OBED* Left 7/10/2015    Procedure: INJ PARA FACET L/S 1 LVL W/IG - L3-S1;  Surgeon: Xavier Arteaga D.O.;  Location: [facility];  Service: Pain Management   • PB INJ DX/THER AGNT PARAVERT FACET JOINT, OBED*  7/10/2015    Procedure: INJ PARA FACET L/S 2D LVL W/IG;  Surgeon: Xavier Arteaga D.O.;  Location: [facility];  Service: Pain Management   • PB INJ DX/THER AGNT PARAVERT FACET JOINT, OBDE*  7/10/2015    Procedure: INJ PARA FACET L/S 3D LVL W/IG;  Surgeon: Xavier Arteaga D.O.;  Location: [facility];  Service: Pain Management   • PB INJECT NERV BLCK,OTHR PERIPH NERV  7/10/2015    Procedure: INJ-ANES AGENT-OTHER PHER.NRVE;  Surgeon: Xavier Arteaga D.O.;  Location: [facility];  Service: Pain Management   • GYN SURGERY      hysterectomy    • LUMPECTOMY Left     Left breast   • OTHER ORTHOPEDIC SURGERY      L knee replacement    • OTHER ORTHOPEDIC SURGERY      R carpal tunnel    • US-NEEDLE CORE BX-BREAST PANEL         CURRENT MEDICATIONS  No current facility-administered medications for this encounter.      Current Outpatient Prescriptions   Medication Sig Dispense Refill   • varenicline (CHANTIX STARTING MONTH LUANN) 0.5 MG X 11 & 1 MG X 42 tablet Take 0.5 mg daily for 3 days, then twice a day for 4 days then 1 mg tablet twice a day 56 Tab 3   • [START ON 7/21/2018] oxyCODONE immediate release (ROXICODONE) 10 MG immediate release tablet Take 1 Tab by mouth every four hours as needed for Moderate Pain for up to 30 days. 90 Tab 0   • traZODone (DESYREL) 150 MG Tab TAKE 2 TABLETS BY MOUTH NIGHTLY AT BEDTIME 180 Tab 0   • DULoxetine (CYMBALTA) 60 MG Cap DR Particles  "delayed-release capsule Take 1 Cap by mouth every day. 90 Cap 3   • famotidine (PEPCID) 20 MG Tab Take 1 Tab by mouth 2 times a day. 180 Tab 3   • benzonatate (TESSALON) 100 MG Cap Take 1 Cap by mouth 3 times a day as needed for Cough. 60 Cap 0   • SPIRIVA HANDIHALER 18 MCG Cap INHALE 1 CAPSULE VIA HANDIHALER BY MOUTH DAILY 90 Cap 0   • lisinopril (PRINIVIL, ZESTRIL) 40 MG tablet TAKE 1 TABLET BY MOUTH DAILY 90 Tab 0   • LANTUS SOLOSTAR 100 UNIT/ML Solution Pen-injector injection INJECT 5-20 UNITS SUBCUTANEOUSLY EVERY EVENING 15 mL 0   • levothyroxine (SYNTHROID) 75 MCG Tab TAKE 1 TABLET BY MOUTH DAILY 90 Tab 1   • Diclofenac Sodium 1 % Gel Apply  to skin as directed.     • cyclobenzaprine (FLEXERIL) 10 MG Tab TAKE 1 TABLET BY MOUTH TWO TIMES DAILY AS NEEDED 30 Tab 0   • lactulose 10 GM/15ML Solution Take 30 mL by mouth 2 times a day. 1 Bottle 3   • hydrochlorothiazide (HYDRODIURIL) 12.5 MG tablet Take 1 Tab by mouth every day. 30 Tab 3   • potassium chloride SA (K-DUR) 10 MEQ Tab CR Take 1 Tab by mouth every day. 30 Tab 3   • budesonide-formoterol (SYMBICORT) 160-4.5 MCG/ACT Aerosol Inhale 2 Puffs by mouth 2 Times a Day. 1 Inhaler 11   • amlodipine (NORVASC) 10 MG Tab Take 1 Tab by mouth every day. 90 Tab 1       ALLERGIES  Allergies   Allergen Reactions   • Vitamin C Diarrhea     \"violent diarrhea\"   • Keflex Rash     Severe rash, but TOLERATES PENICILLINS, Rocephin    • Codeine Nausea     Severe stomach pain   • Gabapentin Palpitations     Gets shaky and falls    • Lyrica Nausea     Nausea, dizzy   • Sudafed Nausea and Unspecified     Chest pain, nausea       PHYSICAL EXAM  VITAL SIGNS: /90   Pulse 96   Temp 36.2 °C (97.1 °F)   Resp 18   Ht 1.651 m (5' 5\")   Wt 92.2 kg (203 lb 4.2 oz)   SpO2 98%   BMI 33.82 kg/m²   Reviewed and borderline blood pressure elevation  Constitutional: Well developed, Well nourished, well appearing.  HENT: Normocephalic, atraumatic, bilateral external ears normal, " oropharynx moist, No exudates or erythema.   Eyes: PERRLA, conjunctiva pale, no scleral icterus.   Cardiovascular: Regular S1 and S2 without murmur, rub, gallop.  Respiratory: No rales, rhonchi, wheeze.  Gastrointestinal:, Moderately obese, nontender, bowel sounds negative. No anal fissures. Induration in the perineal skin without erythema or edema or fluctuance. This area that is tender 1-1/2 cm., No hemorrhoids.  Skin: No erythema, no rash.   Genitourinary:  No costovertebral angle tenderness.   Neurologic: Alert & oriented x 3, cranial nerves 2-12 intact by passive exam.  No focal deficit noted.  Psychiatric: Affect normal, Judgment normal, Mood normal.     DIFFERENTIAL DIAGNOSIS:  Perianal abscess, perirectal abscess, anal fissure, doubt fistula, bronchitis, doubt tuberculosis or lung cancer.      RADIOLOGY/PROCEDURES  CT-PELVIS W/O PLUS RECONS   Final Result      No perianal or perirectal abscess is identified.      Colonic diverticulosis.      Moderate amount of colonic stool.      Atherosclerotic plaque.         DX-CHEST-LIMITED (1 VIEW)   Final Result      No acute cardiopulmonary disease.          LABORATORY:  Results for orders placed or performed during the hospital encounter of 07/03/18   CBC WITH DIFFERENTIAL   Result Value Ref Range    WBC 10.3 4.8 - 10.8 K/uL    RBC 3.35 (L) 4.20 - 5.40 M/uL    Hemoglobin 10.8 (L) 12.0 - 16.0 g/dL    Hematocrit 32.4 (L) 37.0 - 47.0 %    MCV 96.7 81.4 - 97.8 fL    MCH 32.2 27.0 - 33.0 pg    MCHC 33.3 (L) 33.6 - 35.0 g/dL    RDW 43.2 35.9 - 50.0 fL    Platelet Count 240 164 - 446 K/uL    MPV 9.1 9.0 - 12.9 fL    Neutrophils-Polys 77.90 (H) 44.00 - 72.00 %    Lymphocytes 13.20 (L) 22.00 - 41.00 %    Monocytes 6.10 0.00 - 13.40 %    Eosinophils 1.20 0.00 - 6.90 %    Basophils 0.50 0.00 - 1.80 %    Immature Granulocytes 1.10 (H) 0.00 - 0.90 %    Nucleated RBC 0.00 /100 WBC    Neutrophils (Absolute) 7.99 (H) 2.00 - 7.15 K/uL    Lymphs (Absolute) 1.35 1.00 - 4.80 K/uL     Monos (Absolute) 0.63 0.00 - 0.85 K/uL    Eos (Absolute) 0.12 0.00 - 0.51 K/uL    Baso (Absolute) 0.05 0.00 - 0.12 K/uL    Immature Granulocytes (abs) 0.11 0.00 - 0.11 K/uL    NRBC (Absolute) 0.00 K/uL   BASIC METABOLIC PANEL   Result Value Ref Range    Sodium 133 (L) 135 - 145 mmol/L    Potassium 6.1 (H) 3.6 - 5.5 mmol/L    Chloride 104 96 - 112 mmol/L    Co2 22 20 - 33 mmol/L    Glucose 126 (H) 65 - 99 mg/dL    Bun 44 (H) 8 - 22 mg/dL    Creatinine 1.85 (H) 0.50 - 1.40 mg/dL    Calcium 10.2 8.5 - 10.5 mg/dL    Anion Gap 7.0 0.0 - 11.9       INTERVENTIONS:  Medications   oxyCODONE immediate-release (ROXICODONE) tablet 10 mg (10 mg Oral Given 7/3/18 7785)   iohexol (OMNIPAQUE) 240 mg/mL (100 mL Oral Given 7/3/18 1628)       COURSE & MEDICAL DECISION MAKING  This patient presents with perianal pain and an area of induration without evidence of perianal or perirectal abscess on CT.  There is no evidence of fistula.  There is no hemorrhoid or obvious anal fissure.  She also had hemoptysis without evidence of lung mass or TB.  It is likely she has bronchitis or the bleeding was from nosebleed which she has had recently.  Patient has known renal insufficiency.  She has hyperkalemia but still has functional enough kidneys that she requires no acute intervention for this mild elevation of serum potassium..    PLAN:  Reassurance  Follow-up gastroenterology consultants if perianal pain continues    CONDITION: Stable.    FINAL IMPRESSION  1. Perianal pain    2.      Hyperkalemia with renal insufficiency  3.      History of diabetes      Electronically signed by: Mo Motta, 7/3/2018 2:26 PM

## 2018-07-04 NOTE — ED NOTES
Patient verbalizes understanding of discharge information, follow up and when to return to the ED.  Patient unable to get a ride home,  notified and are arranging transportation this one time for the patient

## 2018-07-04 NOTE — DISCHARGE PLANNING
Medical Social Work    MSW received call from bedside RN stated that pt has no ride home to [place]. Pt is also unable to afford a cab ride. MSW scheduled one time ride for pt back to to [place] with [employer]. MSW updated bedside RN.

## 2018-07-05 ENCOUNTER — OFFICE VISIT (OUTPATIENT)
Dept: MEDICAL GROUP | Facility: PHYSICIAN GROUP | Age: 68
End: 2018-07-05
Payer: MEDICARE

## 2018-07-05 VITALS
TEMPERATURE: 98.3 F | WEIGHT: 208 LBS | RESPIRATION RATE: 20 BRPM | SYSTOLIC BLOOD PRESSURE: 94 MMHG | HEIGHT: 65 IN | HEART RATE: 80 BPM | DIASTOLIC BLOOD PRESSURE: 58 MMHG | BODY MASS INDEX: 34.66 KG/M2 | OXYGEN SATURATION: 97 %

## 2018-07-05 DIAGNOSIS — E78.2 MIXED HYPERLIPIDEMIA: Chronic | ICD-10-CM

## 2018-07-05 DIAGNOSIS — I10 ESSENTIAL HYPERTENSION: ICD-10-CM

## 2018-07-05 DIAGNOSIS — E03.9 HYPOTHYROIDISM, UNSPECIFIED TYPE: Chronic | ICD-10-CM

## 2018-07-05 DIAGNOSIS — Z09 HOSPITAL DISCHARGE FOLLOW-UP: ICD-10-CM

## 2018-07-05 DIAGNOSIS — N18.4 CKD (CHRONIC KIDNEY DISEASE), STAGE IV (HCC): ICD-10-CM

## 2018-07-05 DIAGNOSIS — E87.5 HYPERKALEMIA: ICD-10-CM

## 2018-07-05 DIAGNOSIS — Z79.4 TYPE 2 DIABETES MELLITUS WITH COMPLICATION, WITH LONG-TERM CURRENT USE OF INSULIN (HCC): Chronic | ICD-10-CM

## 2018-07-05 DIAGNOSIS — D63.8 ANEMIA OF CHRONIC DISEASE: Chronic | ICD-10-CM

## 2018-07-05 DIAGNOSIS — E11.8 TYPE 2 DIABETES MELLITUS WITH COMPLICATION, WITH LONG-TERM CURRENT USE OF INSULIN (HCC): Chronic | ICD-10-CM

## 2018-07-05 PROBLEM — R39.198 ABNORMALITY OF URINATION: Status: RESOLVED | Noted: 2017-09-28 | Resolved: 2018-07-05

## 2018-07-05 PROBLEM — G93.40 ENCEPHALOPATHY: Status: RESOLVED | Noted: 2017-04-04 | Resolved: 2018-07-05

## 2018-07-05 PROBLEM — E11.9 DM2 (DIABETES MELLITUS, TYPE 2) (HCC): Chronic | Status: RESOLVED | Noted: 2017-06-30 | Resolved: 2018-07-05

## 2018-07-05 PROCEDURE — 99214 OFFICE O/P EST MOD 30 MIN: CPT | Performed by: FAMILY MEDICINE

## 2018-07-05 RX ORDER — VARENICLINE TARTRATE 1 MG/1
1 TABLET, FILM COATED ORAL 2 TIMES DAILY
Qty: 60 TAB | Refills: 3 | Status: SHIPPED | OUTPATIENT
Start: 2018-07-05 | End: 2019-04-14

## 2018-07-05 NOTE — ASSESSMENT & PLAN NOTE
FIT was negative  H/H down to 10.8/32.4  Due to worsening CKD  Referral to nephrology in place.

## 2018-07-05 NOTE — ASSESSMENT & PLAN NOTE
She takes HCTZ and is on a K supplement  Advised her to stop these. Repeat lab ordered to recheck   Level was increased to 6.1, she trends to be high on K and has even been up to 6.2 in the past.

## 2018-07-05 NOTE — ASSESSMENT & PLAN NOTE
A1c improved to 5.8  She does not check sugars regularly reports they used to be between   Severe decrease in GFR, K is also elevated, has referral to nephrology to evaluate this.   She does not use her lantus anymore and is not taking metformin.   LDL is <70  She is due for her eye exam and I encouraged her to get this done.

## 2018-07-05 NOTE — PROGRESS NOTES
Subjective:   Chaitanya Cabello is a 67 y.o. female here today for evaluation and management of:     Diabetes mellitus, type II (CMS-[facility])  A1c improved to 5.8  She does not check sugars regularly reports they used to be between   Severe decrease in GFR, K is also elevated, has referral to nephrology to evaluate this.   She does not use her lantus anymore and is not taking metformin.   LDL is <70  She is due for her eye exam and I encouraged her to get this done.       HLD (hyperlipidemia)  Not on a statin, due for a recheck   Had elevated TG last year.     Hospital discharge follow-up  She was evaluated in ER for a rectal fistula, CT scan showed no fistula  She has no further issues: was feeling that stool was pushing backward under her skin. I reviewed CT and explained there is no extra tract and reassured her. She uses lactulose or fruits to help with her bowel movements.     HTN (hypertension)  Blood pressure is trending low today down to 94/58  advised her to stop the HCTZ and potassium as K is high and to stop the amlodipien and continue on the lisinopril 40 mg. I hope normalizing her BP will also help improve her GFR which has dropped to 27    Hyperkalemia  She takes HCTZ and is on a K supplement  Advised her to stop these. Repeat lab ordered to recheck   Level was increased to 6.1, she trends to be high on K and has even been up to 6.2 in the past.     Hypothyroidism  Due for recheck of her TSH which was normal last year. Is on levothyroxine 75 mcg daily.     Anemia of chronic disease  FIT was negative  H/H down to 10.8/32.4  Due to worsening CKD  Referral to nephrology in place.       CKD (chronic kidney disease), stage IV (HCC)  Gradually worsening GFR  Results for CHAITANYA CABELLO (MRN [id number]) as of 7/5/2018 14:47   Ref. Range 11/11/2017 22:53 5/16/2018 11:56 5/21/2018 14:53 5/23/2018 09:56 7/3/2018 15:10   GFR If Non  Latest Ref Range: >60 mL/min/1.73 m 2 45 (A) 30  (A) 29 (A) 26 (A) 27 (A)   Results for CHAITANYA CABELLO (MRN [id number]) as of 7/5/2018 14:47   Ref. Range 11/11/2017 22:53 5/16/2018 11:56 5/21/2018 14:53 5/23/2018 09:56 7/3/2018 15:10   Bun Latest Ref Range: 8 - 22 mg/dL 23 (H) 42 (H) 38 (H) 33 (H) 44 (H)   Creatinine Latest Ref Range: 0.50 - 1.40 mg/dL 1.19 1.70 (H) 1.73 (H) 1.91 (H) 1.85 (H)     K also elevated to 6.1, she runs high, I stopped her K supplement  She also has low BP I stopped her amlodipine and HCTZ today, she continues on lisinopril 40 mg which can be adjusted by her nephrologist if necessary.     She also has chronic anemia due to the CKD. Hb so far above 10    I advised her to avoid soda. And to stay hydrated with water.   She has an appointment with nephrology at the end of this month.          Current medicines (including changes today)  Current Outpatient Prescriptions   Medication Sig Dispense Refill   • ipratropium (ATROVENT) 17 MCG/ACT Aero Soln Inhale 2 Puffs by mouth every 6 hours. 17 g 11   • varenicline (CHANTIX STARTING MONTH PAK) 0.5 MG X 11 & 1 MG X 42 tablet Take 0.5 mg daily for 3 days, then twice a day for 4 days then 1 mg tablet twice a day 56 Tab 3   • [START ON 7/21/2018] oxyCODONE immediate release (ROXICODONE) 10 MG immediate release tablet Take 1 Tab by mouth every four hours as needed for Moderate Pain for up to 30 days. 90 Tab 0   • traZODone (DESYREL) 150 MG Tab TAKE 2 TABLETS BY MOUTH NIGHTLY AT BEDTIME 180 Tab 0   • DULoxetine (CYMBALTA) 60 MG Cap DR Particles delayed-release capsule Take 1 Cap by mouth every day. 90 Cap 3   • benzonatate (TESSALON) 100 MG Cap Take 1 Cap by mouth 3 times a day as needed for Cough. 60 Cap 0   • lisinopril (PRINIVIL, ZESTRIL) 40 MG tablet TAKE 1 TABLET BY MOUTH DAILY 90 Tab 0   • LANTUS SOLOSTAR 100 UNIT/ML Solution Pen-injector injection INJECT 5-20 UNITS SUBCUTANEOUSLY EVERY EVENING 15 mL 0   • levothyroxine (SYNTHROID) 75 MCG Tab TAKE 1 TABLET BY MOUTH DAILY 90 Tab 1   •  "Diclofenac Sodium 1 % Gel Apply  to skin as directed.     • cyclobenzaprine (FLEXERIL) 10 MG Tab TAKE 1 TABLET BY MOUTH TWO TIMES DAILY AS NEEDED 30 Tab 0   • lactulose 10 GM/15ML Solution Take 30 mL by mouth 2 times a day. 1 Bottle 3   • budesonide-formoterol (SYMBICORT) 160-4.5 MCG/ACT Aerosol Inhale 2 Puffs by mouth 2 Times a Day. 1 Inhaler 11     No current facility-administered medications for this visit.      She  has a past medical history of Arthritis; ASTHMA; Backpain; Breath shortness; Bronchitis; Congestive heart failure (HCC) (2013); COPD; Diabetes; Disorder of thyroid; Fall; Fibromyalgia; GERD (gastroesophageal reflux disease); Heart burn; Hepatitis C; Hypertension; Indigestion; Infectious disease; Neuropathy (HCC); On home oxygen therapy; Other specified symptom associated with female genital organs; Pain (9/13/2016); PND (post-nasal drip); Pneumonia; Psychiatric problem (9/13/2016); RLS (restless legs syndrome); Sleep apnea; and Unspecified urinary incontinence.    ROS  No chest pain, no shortness of breath, no abdominal pain       Objective:     Blood pressure (!) 94/58, pulse 80, temperature 36.8 °C (98.3 °F), resp. rate 20, height 1.651 m (5' 5\"), weight 94.3 kg (208 lb), SpO2 97 %. Body mass index is 34.61 kg/m².   Physical Exam:  Constitutional: Alert, no distress.  Skin: Warm, dry, good turgor, no rashes in visible areas.  Eye: Equal, round and reactive, conjunctiva clear, lids normal.  ENMT: Lips without lesions, good dentition, oropharynx clear.  Neck: Trachea midline, no masses, no thyromegaly. No cervical or supraclavicular lymphadenopathy  Respiratory: Unlabored respiratory effort, lungs clear to auscultation, no wheezes, no ronchi.  Cardiovascular: Normal S1, S2, no murmur, no edema.  Abdomen: Soft, non-tender, no masses, no hepatosplenomegaly.  Psych: Alert and oriented x3, normal affect and mood.        Assessment and Plan:   The following treatment plan was discussed    1. Type 2 " diabetes mellitus with complication, with long-term current use of insulin (HCC)  Controlled.   Continue to monitor blood sugars.     2. Mixed hyperlipidemia  Due for labs.   - LIPID PROFILE; Future    3. Hospital discharge follow-up  Stable.     4. Essential hypertension  Stop HCTZ and stop amlodipine and stop K supplement.     5. Hyperkalemia  Stop K supplement.   - RENAL FUNCTION PANEL; Future    6. Hypothyroidism, unspecified type  Due for labs.   - TSH WITH REFLEX TO FT4; Future    7. Anemia of chronic disease  Due to CKD   Continue to monitor.     8. CKD (chronic kidney disease), stage IV ([facility])  Follow up with nephrologist visit  Stay hydrated with water. Stop soda, avoid salty food and snacks. Avoid NSAIDS like ibuprofen  Repeat labs ordered.       Followup: No Follow-up on file.

## 2018-07-05 NOTE — ASSESSMENT & PLAN NOTE
She was evaluated in ER for a rectal fistula, CT scan showed no fistula  She has no further issues: was feeling that stool was pushing backward under her skin. I reviewed CT and explained there is no extra tract and reassured her. She uses lactulose or fruits to help with her bowel movements.

## 2018-07-05 NOTE — PATIENT INSTRUCTIONS
Follow up with nephrologist visit  Stay hydrated with water. Stop soda, avoid salty food and snacks. Avoid NSAIDS like ibuprofen  Repeat labs ordered to be done about a week before 7/31/18.  Stop HCTZ and stop amlodipine and stop Potassium supplement.

## 2018-07-05 NOTE — ASSESSMENT & PLAN NOTE
Gradually worsening GFR  Results for CHAITANYA CABELLO (MRN [id number]) as of 7/5/2018 14:47   Ref. Range 11/11/2017 22:53 5/16/2018 11:56 5/21/2018 14:53 5/23/2018 09:56 7/3/2018 15:10   GFR If Non  Latest Ref Range: >60 mL/min/1.73 m 2 45 (A) 30 (A) 29 (A) 26 (A) 27 (A)   Results for CHAITANYA CABELLO (MRN [id number]) as of 7/5/2018 14:47   Ref. Range 11/11/2017 22:53 5/16/2018 11:56 5/21/2018 14:53 5/23/2018 09:56 7/3/2018 15:10   Bun Latest Ref Range: 8 - 22 mg/dL 23 (H) 42 (H) 38 (H) 33 (H) 44 (H)   Creatinine Latest Ref Range: 0.50 - 1.40 mg/dL 1.19 1.70 (H) 1.73 (H) 1.91 (H) 1.85 (H)     K also elevated to 6.1, she runs high, I stopped her K supplement  She also has low BP I stopped her amlodipine and HCTZ today, she continues on lisinopril 40 mg which can be adjusted by her nephrologist if necessary.     She also has chronic anemia due to the CKD. Hb so far above 10    I advised her to avoid soda. And to stay hydrated with water.   She has an appointment with nephrology at the end of this month.

## 2018-07-05 NOTE — ASSESSMENT & PLAN NOTE
Blood pressure is trending low today down to 94/58  advised her to stop the HCTZ and potassium as K is high and to stop the amlodipien and continue on the lisinopril 40 mg. I hope normalizing her BP will also help improve her GFR which has dropped to 27

## 2018-07-20 NOTE — TELEPHONE ENCOUNTER
Was the patient seen in the last year in this department? Yes     Does patient have an active prescription for medications requested? No     Received Request Via: Pharmacy      Pt met protocol?: Yes    LAST OV 07/05/2018

## 2018-07-23 RX ORDER — TRAZODONE HYDROCHLORIDE 150 MG/1
TABLET ORAL
Qty: 180 TAB | Refills: 0 | Status: SHIPPED | OUTPATIENT
Start: 2018-07-23 | End: 2018-08-07 | Stop reason: SDUPTHER

## 2018-07-24 ENCOUNTER — HOSPITAL ENCOUNTER (OUTPATIENT)
Dept: LAB | Facility: MEDICAL CENTER | Age: 68
End: 2018-07-24
Attending: FAMILY MEDICINE
Payer: MEDICARE

## 2018-07-24 DIAGNOSIS — E87.5 HYPERKALEMIA: ICD-10-CM

## 2018-07-24 DIAGNOSIS — E78.2 MIXED HYPERLIPIDEMIA: Chronic | ICD-10-CM

## 2018-07-24 DIAGNOSIS — E03.9 HYPOTHYROIDISM, UNSPECIFIED TYPE: Chronic | ICD-10-CM

## 2018-07-24 DIAGNOSIS — G89.4 CHRONIC PAIN SYNDROME: Chronic | ICD-10-CM

## 2018-07-24 DIAGNOSIS — J43.9 PULMONARY EMPHYSEMA, UNSPECIFIED EMPHYSEMA TYPE (HCC): Chronic | ICD-10-CM

## 2018-07-24 LAB
ALBUMIN SERPL BCP-MCNC: 4.3 G/DL (ref 3.2–4.9)
ALBUMIN/GLOB SERPL: 1.7 G/DL
ALP SERPL-CCNC: 44 U/L (ref 30–99)
ALT SERPL-CCNC: 25 U/L (ref 2–50)
ANION GAP SERPL CALC-SCNC: 8 MMOL/L (ref 0–11.9)
AST SERPL-CCNC: 22 U/L (ref 12–45)
BILIRUB SERPL-MCNC: 0.3 MG/DL (ref 0.1–1.5)
BUN SERPL-MCNC: 33 MG/DL (ref 8–22)
CALCIUM SERPL-MCNC: 9.9 MG/DL (ref 8.5–10.5)
CHLORIDE SERPL-SCNC: 104 MMOL/L (ref 96–112)
CHOLEST SERPL-MCNC: 241 MG/DL (ref 100–199)
CO2 SERPL-SCNC: 28 MMOL/L (ref 20–33)
CREAT SERPL-MCNC: 1.6 MG/DL (ref 0.5–1.4)
GLOBULIN SER CALC-MCNC: 2.5 G/DL (ref 1.9–3.5)
GLUCOSE SERPL-MCNC: 155 MG/DL (ref 65–99)
HDLC SERPL-MCNC: 51 MG/DL
LDLC SERPL CALC-MCNC: 137 MG/DL
PHOSPHATE SERPL-MCNC: 4.2 MG/DL (ref 2.5–4.5)
POTASSIUM SERPL-SCNC: 5.8 MMOL/L (ref 3.6–5.5)
PROT SERPL-MCNC: 6.8 G/DL (ref 6–8.2)
SODIUM SERPL-SCNC: 140 MMOL/L (ref 135–145)
TRIGL SERPL-MCNC: 266 MG/DL (ref 0–149)
TSH SERPL DL<=0.005 MIU/L-ACNC: 3.61 UIU/ML (ref 0.38–5.33)

## 2018-07-24 PROCEDURE — 84100 ASSAY OF PHOSPHORUS: CPT

## 2018-07-24 PROCEDURE — 80053 COMPREHEN METABOLIC PANEL: CPT

## 2018-07-24 PROCEDURE — 36415 COLL VENOUS BLD VENIPUNCTURE: CPT

## 2018-07-24 PROCEDURE — 80061 LIPID PANEL: CPT

## 2018-07-24 PROCEDURE — 84443 ASSAY THYROID STIM HORMONE: CPT

## 2018-07-31 ENCOUNTER — TELEMEDICINE ORIGINATING SITE VISIT (OUTPATIENT)
Dept: MEDICAL GROUP | Facility: PHYSICIAN GROUP | Age: 68
End: 2018-07-31
Payer: MEDICARE

## 2018-07-31 ENCOUNTER — TELEMEDICINE2 (OUTPATIENT)
Dept: NEPHROLOGY | Facility: MEDICAL CENTER | Age: 68
End: 2018-07-31
Payer: MEDICARE

## 2018-07-31 VITALS
BODY MASS INDEX: 35.22 KG/M2 | SYSTOLIC BLOOD PRESSURE: 120 MMHG | TEMPERATURE: 98.6 F | WEIGHT: 211.4 LBS | HEIGHT: 65 IN | OXYGEN SATURATION: 92 % | HEART RATE: 96 BPM | DIASTOLIC BLOOD PRESSURE: 70 MMHG

## 2018-07-31 DIAGNOSIS — N18.30 CHRONIC KIDNEY DISEASE, STAGE 3 (HCC): ICD-10-CM

## 2018-07-31 DIAGNOSIS — I50.33 ACUTE ON CHRONIC DIASTOLIC CHF (CONGESTIVE HEART FAILURE), NYHA CLASS 1 (HCC): ICD-10-CM

## 2018-07-31 DIAGNOSIS — N17.9 AKI (ACUTE KIDNEY INJURY) (HCC): ICD-10-CM

## 2018-07-31 DIAGNOSIS — E11.29 TYPE 2 DIABETES MELLITUS WITH MICROALBUMINURIA, WITH LONG-TERM CURRENT USE OF INSULIN (HCC): Chronic | ICD-10-CM

## 2018-07-31 DIAGNOSIS — J43.9 PULMONARY EMPHYSEMA, UNSPECIFIED EMPHYSEMA TYPE (HCC): Chronic | ICD-10-CM

## 2018-07-31 DIAGNOSIS — R80.9 PROTEINURIA, UNSPECIFIED TYPE: ICD-10-CM

## 2018-07-31 DIAGNOSIS — N18.4 CKD (CHRONIC KIDNEY DISEASE), STAGE IV (HCC): ICD-10-CM

## 2018-07-31 DIAGNOSIS — R80.9 TYPE 2 DIABETES MELLITUS WITH MICROALBUMINURIA, WITH LONG-TERM CURRENT USE OF INSULIN (HCC): Chronic | ICD-10-CM

## 2018-07-31 DIAGNOSIS — E87.5 HYPERKALEMIA: ICD-10-CM

## 2018-07-31 DIAGNOSIS — I10 ESSENTIAL HYPERTENSION: ICD-10-CM

## 2018-07-31 DIAGNOSIS — Z79.4 TYPE 2 DIABETES MELLITUS WITH MICROALBUMINURIA, WITH LONG-TERM CURRENT USE OF INSULIN (HCC): Chronic | ICD-10-CM

## 2018-07-31 PROCEDURE — 99204 OFFICE O/P NEW MOD 45 MIN: CPT | Performed by: INTERNAL MEDICINE

## 2018-07-31 ASSESSMENT — ENCOUNTER SYMPTOMS
NAUSEA: 0
FEVER: 0
HYPERTENSION: 1
COUGH: 0
CHILLS: 0
VOMITING: 0
BACK PAIN: 1
SHORTNESS OF BREATH: 1

## 2018-07-31 NOTE — PROGRESS NOTES
"Subjective:      Priya Callahan is a 67 y.o. female who presents with Hypertension and Chronic Kidney Disease            Patient has a history of chronic back pain, she is on chronic opiate use, recently diagnosed with acute kidney injury on chronic kidney disease, her blood pressure was low.      Hypertension   This is a chronic problem. The current episode started more than 1 year ago. The problem has been waxing and waning since onset. The problem is controlled. Associated symptoms include shortness of breath. Pertinent negatives include no chest pain or peripheral edema. There are no associated agents to hypertension. Risk factors for coronary artery disease include diabetes mellitus, obesity and post-menopausal state. Past treatments include ACE inhibitors. The current treatment provides significant improvement. There are no compliance problems.  Hypertensive end-organ damage includes kidney disease. Identifiable causes of hypertension include chronic renal disease.   Chronic Kidney Disease   This is a chronic problem. The current episode started more than 1 year ago. The problem occurs constantly. The problem has been waxing and waning. Pertinent negatives include no chest pain, chills, coughing, fever, nausea, urinary symptoms or vomiting. Exacerbated by: hypotension. She has tried nothing for the symptoms.       Review of Systems   Constitutional: Negative for chills and fever.   Respiratory: Positive for shortness of breath. Negative for cough.    Cardiovascular: Negative for chest pain and leg swelling.   Gastrointestinal: Negative for nausea and vomiting.   Genitourinary: Negative for dysuria, frequency and urgency.   Musculoskeletal: Positive for back pain.   All other systems reviewed and are negative.         Objective:     /70   Pulse 96   Temp 37 °C (98.6 °F)   Ht 1.651 m (5' 5\")   Wt 95.9 kg (211 lb 6.4 oz)   SpO2 92%   BMI 35.18 kg/m²      Physical Exam   Constitutional: She " is oriented to person, place, and time. She appears well-developed and well-nourished. No distress.   HENT:   Head: Normocephalic and atraumatic.   Right Ear: External ear normal.   Left Ear: External ear normal.   Nose: Nose normal.   Mouth/Throat: No oropharyngeal exudate.   Eyes: Conjunctivae are normal. Right eye exhibits no discharge. Left eye exhibits no discharge. No scleral icterus.   Neck: Normal range of motion. Neck supple. No JVD present. No tracheal deviation present. No thyromegaly present.   Cardiovascular: Normal rate, regular rhythm and normal heart sounds.  Exam reveals no friction rub.    Pulmonary/Chest: Effort normal and breath sounds normal. No respiratory distress. She has no wheezes.   Abdominal: Soft. Bowel sounds are normal. She exhibits no distension. There is no tenderness. There is no guarding.   Musculoskeletal: She exhibits no edema or tenderness.   Neurological: She is alert and oriented to person, place, and time. No cranial nerve deficit.   Skin: Skin is warm and dry. She is not diaphoretic. No erythema.   Psychiatric: She has a normal mood and affect. Her behavior is normal. Judgment and thought content normal.   Nursing note and vitals reviewed.     exam was done by the help of the nurse at the remote facility.            Assessment/Plan:     1. HAN (acute kidney injury) (HCC)  Etiology is most likely prerenal component secondary to low blood pressure  Patient has no uremic symptoms  No acute need for dialysis    2. CKD (chronic kidney disease)  Secondary to diabetic nephropathy  Renal dose all medication  Avoid nephrotoxins    3. Type 2 diabetes mellitus with microalbuminuria, with long-term current use of insulin ([facility])  Continue to optimize diabetes control    4. Hyperkalemia  Multi-factorial  Patient was taken potassium supplement. Also she is on Rich potassium diet  Potassium supplement was stopped, patient was advised to avoid rich potassium food  Recheck labs, if her  potassium still elevated will avoid ACE inhibitor    5. Essential hypertension  Controlled  Continued low sodium diet    6. Acute on chronic diastolic CHF (congestive heart failure), NYHA class 1 ([facility])      7. Pulmonary emphysema, unspecified emphysema type ([facility])    8. Proteinuria, unspecified type

## 2018-08-07 ENCOUNTER — OFFICE VISIT (OUTPATIENT)
Dept: MEDICAL GROUP | Facility: PHYSICIAN GROUP | Age: 68
End: 2018-08-07
Payer: MEDICARE

## 2018-08-07 VITALS
RESPIRATION RATE: 12 BRPM | SYSTOLIC BLOOD PRESSURE: 122 MMHG | OXYGEN SATURATION: 91 % | DIASTOLIC BLOOD PRESSURE: 68 MMHG | TEMPERATURE: 98.6 F | HEART RATE: 82 BPM

## 2018-08-07 DIAGNOSIS — Z79.891 CHRONIC USE OF OPIATE DRUGS THERAPEUTIC PURPOSES: ICD-10-CM

## 2018-08-07 DIAGNOSIS — M47.16 LUMBAR SPONDYLOSIS WITH MYELOPATHY: ICD-10-CM

## 2018-08-07 DIAGNOSIS — G89.4 CHRONIC PAIN SYNDROME: Chronic | ICD-10-CM

## 2018-08-07 DIAGNOSIS — F51.01 PRIMARY INSOMNIA: ICD-10-CM

## 2018-08-07 DIAGNOSIS — M15.9 PRIMARY OSTEOARTHRITIS INVOLVING MULTIPLE JOINTS: ICD-10-CM

## 2018-08-07 DIAGNOSIS — M79.641 BILATERAL HAND PAIN: ICD-10-CM

## 2018-08-07 DIAGNOSIS — N17.9 AKI (ACUTE KIDNEY INJURY) (HCC): ICD-10-CM

## 2018-08-07 DIAGNOSIS — M79.642 BILATERAL HAND PAIN: ICD-10-CM

## 2018-08-07 DIAGNOSIS — G62.9 NEUROPATHY: ICD-10-CM

## 2018-08-07 PROCEDURE — 99214 OFFICE O/P EST MOD 30 MIN: CPT | Performed by: FAMILY MEDICINE

## 2018-08-07 RX ORDER — OXYCODONE HYDROCHLORIDE 10 MG/1
10 TABLET ORAL EVERY 4 HOURS PRN
Qty: 90 TAB | Refills: 0 | Status: SHIPPED | OUTPATIENT
Start: 2018-08-20 | End: 2018-08-07 | Stop reason: SDUPTHER

## 2018-08-07 RX ORDER — TRAZODONE HYDROCHLORIDE 150 MG/1
300 TABLET ORAL 3 TIMES DAILY
Qty: 540 TAB | Refills: 3 | Status: SHIPPED | OUTPATIENT
Start: 2018-08-07 | End: 2019-12-03 | Stop reason: SDUPTHER

## 2018-08-07 RX ORDER — BUDESONIDE AND FORMOTEROL FUMARATE DIHYDRATE 160; 4.5 UG/1; UG/1
AEROSOL RESPIRATORY (INHALATION)
Qty: 3 INHALER | Refills: 0 | Status: SHIPPED | OUTPATIENT
Start: 2018-08-07 | End: 2018-11-09 | Stop reason: SDUPTHER

## 2018-08-07 RX ORDER — OXYCODONE HYDROCHLORIDE 10 MG/1
10 TABLET ORAL EVERY 4 HOURS PRN
Qty: 90 TAB | Refills: 0 | Status: SHIPPED | OUTPATIENT
Start: 2018-09-20 | End: 2018-08-07 | Stop reason: SDUPTHER

## 2018-08-07 RX ORDER — AMITRIPTYLINE HYDROCHLORIDE 50 MG/1
50 TABLET, FILM COATED ORAL NIGHTLY PRN
Qty: 90 TAB | Refills: 3 | Status: ON HOLD | OUTPATIENT
Start: 2018-08-07 | End: 2019-02-12

## 2018-08-07 RX ORDER — OXYCODONE HYDROCHLORIDE 10 MG/1
10 TABLET ORAL EVERY 4 HOURS PRN
Qty: 90 TAB | Refills: 0 | Status: SHIPPED | OUTPATIENT
Start: 2018-10-20 | End: 2018-10-10 | Stop reason: SDUPTHER

## 2018-08-07 NOTE — ASSESSMENT & PLAN NOTE
This patient is continuing to use a controlled substance (CS) on a long term basis.  The patient is thoroughly aware of the goals of treatment with the CS  The patient is aware that yearly and random urine drug screens are required.  The patient has been instructed to take the CS only as prescribed.  The patient is prohibited from sharing the CS with any other person.  The patient is instructed to inform the provider if any other CS is taken, of any alcohol or cannabis or other recreational drug use, any treatment for side effects of the CS or complications, if they have CS active rx in other states  The patient has evidence for a reason for the CS  The treatment plan has been discussed with the patient  The  report has been reviewed    She does not drink alcohol or use drugs.   Reports she cannot cut back further now on the pain medication taking oxycodone 10 mg TID sometimes takes 4 a day. Advised to take a tylenol 500 mg instead of extra pain pill.   Takes it for chronic back pain. Uses a cane. Is in PT referral done to continue PT.   She is looking into assisted living but it may cost too much.

## 2018-08-07 NOTE — TELEPHONE ENCOUNTER
Was the patient seen in the last year in this department? Yes    Does patient have an active prescription for medications requested? No     Received Request Via: Pharmacy      Pt met protocol?: Yes pt last ov 8/18

## 2018-08-07 NOTE — PATIENT INSTRUCTIONS
Renal/Kidney diet  1. avoid foods that are high in phosphorus  Phosphorus    Phosphorus is a chemical element with symbol P and atomic number 15. As an element, phosphorus exists in two major forms, white phosphorus and red phosphorus, but because it is highly reactive, phosphorus is never found as a free element on Earth. With a concentration of 0.099%, phosphorus is the m…  en.wikipedia.org    , such as beer, soda, cheese, milk, yogurt, oysters, beans, peas, nuts, and many whole grain products.    Avoid foods like banana, kiwi, dried fruits, yogurt, avocado, beans, asparagus, oranges. These are high in potassium.

## 2018-08-07 NOTE — PROGRESS NOTES
Subjective:   Priya Callahan is a 67 y.o. female here today for evaluation and management of:     HAN (acute kidney injury) (CMS-[facility])  Was evaluated by the nephrologist: no dialysis needed. Repeat labs in 1 month. Follow up with nephrology telemed in a month.   Avoid high sodium and high potassium diet.   If persistently decreased kidney function on repeat labs will stop ACEI.       Insomnia  Has been using increasing doses of trazodone now taking three trazodone 150 mg  advised to limit to one or two pills due to risk of overmedication causing oversedation and possibly death.     Chronic use of opiate drugs therapeutic purposes  This patient is continuing to use a controlled substance (CS) on a long term basis.  The patient is thoroughly aware of the goals of treatment with the CS  The patient is aware that yearly and random urine drug screens are required.  The patient has been instructed to take the CS only as prescribed.  The patient is prohibited from sharing the CS with any other person.  The patient is instructed to inform the provider if any other CS is taken, of any alcohol or cannabis or other recreational drug use, any treatment for side effects of the CS or complications, if they have CS active rx in other states  The patient has evidence for a reason for the CS  The treatment plan has been discussed with the patient  The  report has been reviewed    She does not drink alcohol or use drugs.   Reports she cannot cut back further now on the pain medication taking oxycodone 10 mg TID sometimes takes 4 a day. Advised to take a tylenol 500 mg instead of extra pain pill.   Takes it for chronic back pain. Uses a cane. Is in PT referral done to continue PT.   She is looking into assisted living but it may cost too much.          Current medicines (including changes today)  Current Outpatient Prescriptions   Medication Sig Dispense Refill   • traZODone (DESYREL) 150 MG Tab Take 2 Tabs by mouth 3  times a day for 90 days. 540 Tab 3   • amitriptyline (ELAVIL) 50 MG Tab Take 1 Tab by mouth at bedtime as needed for Sleep. 90 Tab 3   • [START ON 10/20/2018] oxyCODONE immediate release (ROXICODONE) 10 MG immediate release tablet Take 1 Tab by mouth every four hours as needed for Moderate Pain for up to 30 days. 90 Tab 0   • Nutritional Supplements (ANTIOXIDANTS PO) Take  by mouth.     • ipratropium (ATROVENT) 17 MCG/ACT Aero Soln Inhale 2 Puffs by mouth every 6 hours. 17 g 11   • varenicline (CHANTIX CONTINUING MONTH LUANN) 1 MG tablet Take 1 Tab by mouth 2 times a day. 60 Tab 3   • DULoxetine (CYMBALTA) 60 MG Cap DR Particles delayed-release capsule Take 1 Cap by mouth every day. 90 Cap 3   • benzonatate (TESSALON) 100 MG Cap Take 1 Cap by mouth 3 times a day as needed for Cough. 60 Cap 0   • lisinopril (PRINIVIL, ZESTRIL) 40 MG tablet TAKE 1 TABLET BY MOUTH DAILY 90 Tab 0   • LANTUS SOLOSTAR 100 UNIT/ML Solution Pen-injector injection INJECT 5-20 UNITS SUBCUTANEOUSLY EVERY EVENING 15 mL 0   • levothyroxine (SYNTHROID) 75 MCG Tab TAKE 1 TABLET BY MOUTH DAILY 90 Tab 1   • Diclofenac Sodium 1 % Gel Apply  to skin as directed.     • cyclobenzaprine (FLEXERIL) 10 MG Tab TAKE 1 TABLET BY MOUTH TWO TIMES DAILY AS NEEDED 30 Tab 0   • lactulose 10 GM/15ML Solution Take 30 mL by mouth 2 times a day. 1 Bottle 3   • budesonide-formoterol (SYMBICORT) 160-4.5 MCG/ACT Aerosol Inhale 2 Puffs by mouth 2 Times a Day. 1 Inhaler 11     No current facility-administered medications for this visit.      She  has a past medical history of Arthritis; ASTHMA; Backpain; Breath shortness; Bronchitis; Congestive heart failure ([facility]) (2013); COPD; Diabetes; Disorder of thyroid; Fall; Fibromyalgia; GERD (gastroesophageal reflux disease); Heart burn; Hepatitis C; Hypertension; Indigestion; Infectious disease; Neuropathy ([facility]); On home oxygen therapy; Other specified symptom associated with female genital organs; Pain (9/13/2016); PND (post-nasal  drip); Pneumonia; Psychiatric problem (9/13/2016); RLS (restless legs syndrome); Sleep apnea; and Unspecified urinary incontinence.    ROS  No chest pain, no shortness of breath, no abdominal pain       Objective:     Blood pressure 122/68, pulse 82, temperature 37 °C (98.6 °F), resp. rate 12, SpO2 91 %. There is no height or weight on file to calculate BMI.   Physical Exam:  Constitutional: Alert, no distress.  Skin: Warm, dry, good turgor, no rashes in visible areas. Slightly raised, flat topped firm nodule on left cheek about 1 cm diameter with small telangectasia around border, circular, tan/skin color.   Eye: Equal, round and reactive, conjunctiva clear, lids normal.  ENMT: Lips without lesions, good dentition, oropharynx clear.  Neck: Trachea midline, no masses, no thyromegaly. No cervical or supraclavicular lymphadenopathy  Respiratory: Unlabored respiratory effort, lungs clear to auscultation, no wheezes, no ronchi.  Cardiovascular: Normal S1, S2, no murmur, no edema.  Abdomen: Soft, non-tender, no masses, no hepatosplenomegaly.  Psych: Alert and oriented x3, normal affect and mood.        Assessment and Plan:   The following treatment plan was discussed    1. HAN (acute kidney injury) (HCC)  Improving.   Advised low sodium, low potassium diet  - RENAL FUNCTION PANEL; Future  - REFERRAL TO NEPHROLOGY    2. Chronic pain syndrome  3 month refills done on pain medication. Advised to use less of this and slowly wean down.   - CONSENT FOR OPIATE PRESCRIPTION  - oxyCODONE immediate release (ROXICODONE) 10 MG immediate release tablet; Take 1 Tab by mouth every four hours as needed for Moderate Pain for up to 30 days.  Dispense: 90 Tab; Refill: 0    3. Lumbar spondylosis with myelopathy  Chronic condition, no acute changes. Improved function with PT referral done. Pain medication refills done.   - CONSENT FOR OPIATE PRESCRIPTION  - oxyCODONE immediate release (ROXICODONE) 10 MG immediate release tablet; Take 1 Tab  by mouth every four hours as needed for Moderate Pain for up to 30 days.  Dispense: 90 Tab; Refill: 0    4. Bilateral hand pain  - REFERRAL TO PHYSICAL THERAPY Reason for Therapy: Eval/Treat/Report    5. Neuropathy (HCC)  - REFERRAL TO PHYSICAL THERAPY Reason for Therapy: Eval/Treat/Report    6. Primary osteoarthritis involving multiple joints  - CONSENT FOR OPIATE PRESCRIPTION  - oxyCODONE immediate release (ROXICODONE) 10 MG immediate release tablet; Take 1 Tab by mouth every four hours as needed for Moderate Pain for up to 30 days.  Dispense: 90 Tab; Refill: 0    7. Primary insomnia  Increase in tramadol rx to 150 TID      Followup: Return in about 3 months (around 11/7/2018) for nov, oct visit for DM, CKD.

## 2018-08-07 NOTE — ASSESSMENT & PLAN NOTE
Has been using increasing doses of trazodone now taking three trazodone 150 mg  advised to limit to one or two pills due to risk of overmedication causing oversedation and possibly death.

## 2018-08-07 NOTE — ASSESSMENT & PLAN NOTE
Was evaluated by the nephrologist: no dialysis needed. Repeat labs in 1 month. Follow up with nephrology telemed in a month.   Avoid high sodium and high potassium diet.   If persistently decreased kidney function on repeat labs will stop ACEI.

## 2018-08-29 RX ORDER — LEVOTHYROXINE SODIUM 0.07 MG/1
TABLET ORAL
Qty: 90 TAB | Refills: 1 | Status: SHIPPED | OUTPATIENT
Start: 2018-08-29 | End: 2019-03-04 | Stop reason: SDUPTHER

## 2018-08-29 NOTE — TELEPHONE ENCOUNTER
Was the patient seen in the last year in this department? Yes    Does patient have an active prescription for medications requested? No     Received Request Via: Pharmacy      Pt met protocol?: Yes    OV 8/18  TSH 7/18

## 2018-08-30 RX ORDER — LISINOPRIL 40 MG/1
TABLET ORAL
Qty: 90 TAB | Refills: 0 | Status: SHIPPED | OUTPATIENT
Start: 2018-08-30 | End: 2018-12-13 | Stop reason: SDUPTHER

## 2018-08-30 NOTE — TELEPHONE ENCOUNTER
Has upcoming appt w/ PCP. Will send 3 months of fills to pharmacy. PCP keeping an eye on K and Na levels.

## 2018-09-07 NOTE — TELEPHONE ENCOUNTER
*MEDICATION NOT ON PT MEDICAL HISTORY*  Was the patient seen in the last year in this department? Yes    Does patient have an active prescription for medications requested? No     Received Request Via: Pharmacy      Pt met protocol?: Yes  LAST OV 08/07/2018

## 2018-09-13 ENCOUNTER — TELEPHONE (OUTPATIENT)
Dept: MEDICAL GROUP | Facility: PHYSICIAN GROUP | Age: 68
End: 2018-09-13

## 2018-09-13 RX ORDER — CLOTRIMAZOLE AND BETAMETHASONE DIPROPIONATE 10; .64 MG/G; MG/G
1 CREAM TOPICAL 2 TIMES DAILY
Qty: 1 TUBE | Refills: 6 | Status: ON HOLD | OUTPATIENT
Start: 2018-09-13 | End: 2019-02-12

## 2018-09-14 NOTE — TELEPHONE ENCOUNTER
----- Message from Tsering Warren, Med Ass't sent at 9/12/2018  4:28 PM PDT -----  Regarding: FW: medication      ----- Message -----  From: Valorie Baca Med Ass't  Sent: 9/12/2018  11:27 AM  To: Tsering Warren, Med Ass't  Subject: medication                                       [facility] called and would like to switch a medication because they do not have it in stock nystatin/triamcinolone (MYCOLOG) 653770-1.1 UNIT/GM-% Cream.

## 2018-10-05 ENCOUNTER — PATIENT OUTREACH (OUTPATIENT)
Dept: HEALTH INFORMATION MANAGEMENT | Facility: OTHER | Age: 68
End: 2018-10-05

## 2018-10-05 NOTE — PROGRESS NOTES
Outcome: call back    Please transfer to Patient Outreach Team at [phone] when patient returns call.    WebIZ Checked & Epic Updated:  yes    [facility] Verified: yes    Attempt # 1

## 2018-10-09 RX ORDER — INSULIN GLARGINE 100 [IU]/ML
INJECTION, SOLUTION SUBCUTANEOUS
Qty: 15 ML | Refills: 1 | Status: SHIPPED | OUTPATIENT
Start: 2018-10-09 | End: 2019-02-08 | Stop reason: CLARIF

## 2018-10-09 NOTE — TELEPHONE ENCOUNTER
Was the patient seen in the last year in this department? Yes    Does patient have an active prescription for medications requested? No     Received Request Via: Pharmacy      Pt met protocol?: Yes. Last ov 8/7/18, last labs 7/24/18    At the 7/5/18 office visit, it was noted patient was not using lantus anymore     Lab Results   Component Value Date/Time    HBA1C 5.8 (H) 05/23/2018 09:56 AM

## 2018-10-09 NOTE — TELEPHONE ENCOUNTER
Patient has recently been seen by PCP within the last 6 months per protocol (8/18). Will refill medications for 6 months.  Lab Results   Component Value Date/Time    HBA1C 5.8 (H) 05/23/2018 09:56 AM      Lab Results   Component Value Date/Time    MALBCRT 1,141 (H) 09/25/2017 11:55 AM    MICROALBUR 228.5 09/25/2017 11:55 AM      Lab Results   Component Value Date/Time    ALKPHOSPHAT 44 07/24/2018 09:32 AM    ASTSGOT 22 07/24/2018 09:32 AM    ALTSGPT 25 07/24/2018 09:32 AM    TBILIRUBIN 0.3 07/24/2018 09:32 AM

## 2018-10-10 ENCOUNTER — HOSPITAL ENCOUNTER (OUTPATIENT)
Dept: LAB | Facility: MEDICAL CENTER | Age: 68
End: 2018-10-10
Attending: INTERNAL MEDICINE
Payer: MEDICARE

## 2018-10-10 ENCOUNTER — APPOINTMENT (OUTPATIENT)
Dept: RADIOLOGY | Facility: IMAGING CENTER | Age: 68
End: 2018-10-10
Attending: FAMILY MEDICINE
Payer: MEDICARE

## 2018-10-10 ENCOUNTER — OFFICE VISIT (OUTPATIENT)
Dept: MEDICAL GROUP | Facility: PHYSICIAN GROUP | Age: 68
End: 2018-10-10
Payer: MEDICARE

## 2018-10-10 VITALS
TEMPERATURE: 97.6 F | OXYGEN SATURATION: 94 % | SYSTOLIC BLOOD PRESSURE: 122 MMHG | DIASTOLIC BLOOD PRESSURE: 72 MMHG | HEART RATE: 81 BPM | RESPIRATION RATE: 18 BRPM

## 2018-10-10 DIAGNOSIS — E87.5 HYPERKALEMIA: ICD-10-CM

## 2018-10-10 DIAGNOSIS — Z79.4 TYPE 2 DIABETES MELLITUS WITH MICROALBUMINURIA, WITH LONG-TERM CURRENT USE OF INSULIN (HCC): Chronic | ICD-10-CM

## 2018-10-10 DIAGNOSIS — N17.9 AKI (ACUTE KIDNEY INJURY) (HCC): ICD-10-CM

## 2018-10-10 DIAGNOSIS — M47.16 LUMBAR SPONDYLOSIS WITH MYELOPATHY: ICD-10-CM

## 2018-10-10 DIAGNOSIS — M20.42 HAMMER TOES OF BOTH FEET: ICD-10-CM

## 2018-10-10 DIAGNOSIS — R80.9 TYPE 2 DIABETES MELLITUS WITH MICROALBUMINURIA, WITH LONG-TERM CURRENT USE OF INSULIN (HCC): Chronic | ICD-10-CM

## 2018-10-10 DIAGNOSIS — J43.9 PULMONARY EMPHYSEMA, UNSPECIFIED EMPHYSEMA TYPE (HCC): Chronic | ICD-10-CM

## 2018-10-10 DIAGNOSIS — Z79.891 CHRONIC USE OF OPIATE DRUGS THERAPEUTIC PURPOSES: ICD-10-CM

## 2018-10-10 DIAGNOSIS — N18.4 CKD (CHRONIC KIDNEY DISEASE), STAGE IV (HCC): ICD-10-CM

## 2018-10-10 DIAGNOSIS — Z23 NEED FOR VACCINATION: ICD-10-CM

## 2018-10-10 DIAGNOSIS — R80.9 PROTEINURIA, UNSPECIFIED TYPE: ICD-10-CM

## 2018-10-10 DIAGNOSIS — I10 ESSENTIAL HYPERTENSION: ICD-10-CM

## 2018-10-10 DIAGNOSIS — M20.41 HAMMER TOES OF BOTH FEET: ICD-10-CM

## 2018-10-10 DIAGNOSIS — I50.33 ACUTE ON CHRONIC DIASTOLIC CHF (CONGESTIVE HEART FAILURE), NYHA CLASS 1 (HCC): ICD-10-CM

## 2018-10-10 DIAGNOSIS — E11.29 TYPE 2 DIABETES MELLITUS WITH MICROALBUMINURIA, WITH LONG-TERM CURRENT USE OF INSULIN (HCC): Chronic | ICD-10-CM

## 2018-10-10 DIAGNOSIS — M15.9 PRIMARY OSTEOARTHRITIS INVOLVING MULTIPLE JOINTS: ICD-10-CM

## 2018-10-10 DIAGNOSIS — L98.491 SKIN ULCER, LIMITED TO BREAKDOWN OF SKIN (HCC): ICD-10-CM

## 2018-10-10 DIAGNOSIS — G89.4 CHRONIC PAIN SYNDROME: Chronic | ICD-10-CM

## 2018-10-10 LAB
ANION GAP SERPL CALC-SCNC: 12 MMOL/L (ref 0–11.9)
BASOPHILS # BLD AUTO: 0.5 % (ref 0–1.8)
BASOPHILS # BLD: 0.05 K/UL (ref 0–0.12)
BUN SERPL-MCNC: 36 MG/DL (ref 8–22)
CALCIUM SERPL-MCNC: 9.2 MG/DL (ref 8.5–10.5)
CHLORIDE SERPL-SCNC: 108 MMOL/L (ref 96–112)
CO2 SERPL-SCNC: 19 MMOL/L (ref 20–33)
CREAT SERPL-MCNC: 1.59 MG/DL (ref 0.5–1.4)
EOSINOPHIL # BLD AUTO: 0.11 K/UL (ref 0–0.51)
EOSINOPHIL NFR BLD: 1.2 % (ref 0–6.9)
ERYTHROCYTE [DISTWIDTH] IN BLOOD BY AUTOMATED COUNT: 46.4 FL (ref 35.9–50)
GLUCOSE SERPL-MCNC: 204 MG/DL (ref 65–99)
HCT VFR BLD AUTO: 32.1 % (ref 37–47)
HGB BLD-MCNC: 10.1 G/DL (ref 12–16)
IMM GRANULOCYTES # BLD AUTO: 0.08 K/UL (ref 0–0.11)
IMM GRANULOCYTES NFR BLD AUTO: 0.8 % (ref 0–0.9)
LYMPHOCYTES # BLD AUTO: 0.97 K/UL (ref 1–4.8)
LYMPHOCYTES NFR BLD: 10.3 % (ref 22–41)
MCH RBC QN AUTO: 31.9 PG (ref 27–33)
MCHC RBC AUTO-ENTMCNC: 31.5 G/DL (ref 33.6–35)
MCV RBC AUTO: 101.3 FL (ref 81.4–97.8)
MONOCYTES # BLD AUTO: 0.49 K/UL (ref 0–0.85)
MONOCYTES NFR BLD AUTO: 5.2 % (ref 0–13.4)
NEUTROPHILS # BLD AUTO: 7.76 K/UL (ref 2–7.15)
NEUTROPHILS NFR BLD: 82 % (ref 44–72)
NRBC # BLD AUTO: 0 K/UL
NRBC BLD-RTO: 0 /100 WBC
PLATELET # BLD AUTO: 246 K/UL (ref 164–446)
PMV BLD AUTO: 9.7 FL (ref 9–12.9)
POTASSIUM SERPL-SCNC: 4.8 MMOL/L (ref 3.6–5.5)
RBC # BLD AUTO: 3.17 M/UL (ref 4.2–5.4)
SODIUM SERPL-SCNC: 139 MMOL/L (ref 135–145)
WBC # BLD AUTO: 9.5 K/UL (ref 4.8–10.8)

## 2018-10-10 PROCEDURE — 99214 OFFICE O/P EST MOD 30 MIN: CPT | Mod: 25 | Performed by: FAMILY MEDICINE

## 2018-10-10 PROCEDURE — 85025 COMPLETE CBC W/AUTO DIFF WBC: CPT

## 2018-10-10 PROCEDURE — 90662 IIV NO PRSV INCREASED AG IM: CPT | Performed by: FAMILY MEDICINE

## 2018-10-10 PROCEDURE — 80048 BASIC METABOLIC PNL TOTAL CA: CPT

## 2018-10-10 PROCEDURE — 77077 JOINT SURVEY SINGLE VIEW: CPT | Mod: 26 | Performed by: FAMILY MEDICINE

## 2018-10-10 PROCEDURE — G0008 ADMIN INFLUENZA VIRUS VAC: HCPCS | Performed by: FAMILY MEDICINE

## 2018-10-10 PROCEDURE — 36415 COLL VENOUS BLD VENIPUNCTURE: CPT

## 2018-10-10 RX ORDER — OXYCODONE HYDROCHLORIDE 10 MG/1
10 TABLET ORAL EVERY 4 HOURS PRN
Qty: 90 TAB | Refills: 0 | Status: SHIPPED | OUTPATIENT
Start: 2018-11-20 | End: 2018-10-10 | Stop reason: SDUPTHER

## 2018-10-10 RX ORDER — OXYCODONE HYDROCHLORIDE 10 MG/1
10 TABLET ORAL EVERY 4 HOURS PRN
Qty: 90 TAB | Refills: 0 | Status: SHIPPED | OUTPATIENT
Start: 2018-12-20 | End: 2019-01-10 | Stop reason: SDUPTHER

## 2018-10-10 RX ORDER — OXYCODONE HYDROCHLORIDE 10 MG/1
10 TABLET ORAL EVERY 4 HOURS PRN
Qty: 90 TAB | Refills: 0 | Status: SHIPPED | OUTPATIENT
Start: 2018-10-20 | End: 2018-10-10

## 2018-10-10 ASSESSMENT — PATIENT HEALTH QUESTIONNAIRE - PHQ9: CLINICAL INTERPRETATION OF PHQ2 SCORE: 0

## 2018-10-10 NOTE — LETTER
October 10, 2018         Priya Callahan  [address]  [place] [address]        Dear Priya:      Below are the results from your recent visit:    Resulted Orders   DX-JOINT SURVEY-FEET SINGLE VIEW    Narrative    10/10/2018 2:10 PM    HISTORY/REASON FOR EXAM:  Atraumatic Pain/Swelling/Deformity.  Bilateral foot pain    TECHNIQUE/EXAM DESCRIPTION AND NUMBER OF VIEWS:  Joint survey, single view of the feet.    COMPARISON: None    FINDINGS:  There is right-sided hallux valgus. There is mild osteophyte formation and sclerosis along the right first metatarsal phalangeal joint. There is also some small osteophytes formation along the left metatarsal phalangeal joint. No fracture or dislocation.   No bony erosions or soft tissue calcifications.      Impression    Mild bilateral first metatarsophalangeal joint osteoarthritis.    Right-sided hallux valgus.     Your xray shows mild arthritis with bone spurs and deviation of the toes.     If you have any questions or concerns, please don't hesitate to call.        Sincerely,      Kavitha Mccall M.D.    Electronically Signed

## 2018-10-10 NOTE — PROGRESS NOTES
Subjective:   Priya Callahan is a 68 y.o. female here today for evaluation and management of:     Chronic use of opiate drugs therapeutic purposes  This patient is continuing to use a controlled substance (CS) on a long term basis.  The patient is thoroughly aware of the goals of treatment with the CS  The patient is aware that yearly and random urine drug screens are required.  The patient has been instructed to take the CS only as prescribed.  The patient is prohibited from sharing the CS with any other person.  The patient is instructed to inform the provider if any other CS is taken, of any alcohol or cannabis or other recreational drug use, any treatment for side effects of the CS or complications, if they have CS active rx in other states  The patient has evidence for a reason for the CS  The treatment plan has been discussed with the patient  The  report has been reviewed    She does not use etoh or drugs  Encouraged to cut down on narcotic pain medication but she states her pain is worsening.   Takes oxycodone 10mg TID sometimes 4 times a day.   She is also trying to get into assisted living. She uses a WC.       Hammer toes of both feet  Pain in both feet due to hammer toes, having difficulty walking, uses a WC  She cannot find any shoes that she can wear without pain   Referral to podiatry done.     Osteoarthritis  Chronic pain in feet, knees, shoulders. She says pain is getting worse. She uses a WC, reports she was checked by orthopedic specialist in the past and was told she has bone on bone arthritis but it has only recently started hurting. She stays in bed all day which is all she can do.       Skin ulcer, limited to breakdown of skin (HCC)  Still struggling with the skin breakdown on right upper thigh, she feels it is not healing due to her panus covering the area and not being able to get a bandaid to stick. It had almost healed but now has flared up again.   Will refer to home health  for wound care.     CKD (chronic kidney disease), stage IV ([facility])  Recheck of renal lab done today. She has follow up with her kidney specialist scheduled.   She continues on lisinopril 40 mg.   Has chronic anemia due to CKD.   Encouraged to stay well hydrated and follow up with nephrology.          Current medicines (including changes today)  Current Outpatient Prescriptions   Medication Sig Dispense Refill   • [START ON 12/20/2018] oxyCODONE immediate release (ROXICODONE) 10 MG immediate release tablet Take 1 Tab by mouth every four hours as needed for Moderate Pain for up to 30 days. 90 Tab 0   • LANTUS SOLOSTAR 100 UNIT/ML Solution Pen-injector injection INJECT 5-20 UNITS SUBCUTANEOUSLY EVERY EVENING 15 mL 1   • clotrimazole-betamethasone (LOTRISONE) 1-0.05 % Cream Apply 1 g to affected area(s) 2 times a day. 1 Tube 6   • nystatin/triamcinolone (MYCOLOG) 460356-4.1 UNIT/GM-% Cream APPLY A THIN LAYER TO FUNGAL RASH ONCE DAILY AS NEEDED 30 g 2   • lisinopril (PRINIVIL, ZESTRIL) 40 MG tablet TAKE 1 TABLET BY MOUTH DAILY 90 Tab 0   • levothyroxine (SYNTHROID) 75 MCG Tab TAKE 1 TABLET BY MOUTH DAILY 90 Tab 1   • SYMBICORT 160-4.5 MCG/ACT Aerosol INHALE 2 PUFFS BY MOUTH TWO TIMES DAILY 3 Inhaler 0   • traZODone (DESYREL) 150 MG Tab Take 2 Tabs by mouth 3 times a day for 90 days. 540 Tab 3   • amitriptyline (ELAVIL) 50 MG Tab Take 1 Tab by mouth at bedtime as needed for Sleep. 90 Tab 3   • Nutritional Supplements (ANTIOXIDANTS PO) Take  by mouth.     • ipratropium (ATROVENT) 17 MCG/ACT Aero Soln Inhale 2 Puffs by mouth every 6 hours. 17 g 11   • varenicline (CHANTIX CONTINUING MONTH LUANN) 1 MG tablet Take 1 Tab by mouth 2 times a day. 60 Tab 3   • DULoxetine (CYMBALTA) 60 MG Cap DR Particles delayed-release capsule Take 1 Cap by mouth every day. 90 Cap 3   • Diclofenac Sodium 1 % Gel Apply  to skin as directed.     • cyclobenzaprine (FLEXERIL) 10 MG Tab TAKE 1 TABLET BY MOUTH TWO TIMES DAILY AS NEEDED 30 Tab 0   •  lactulose 10 GM/15ML Solution Take 30 mL by mouth 2 times a day. 1 Bottle 3   • benzonatate (TESSALON) 100 MG Cap Take 1 Cap by mouth 3 times a day as needed for Cough. 60 Cap 0     No current facility-administered medications for this visit.      She  has a past medical history of Arthritis; ASTHMA; Backpain; Breath shortness; Bronchitis; Congestive heart failure (HCC) (2013); COPD; Diabetes; Disorder of thyroid; Fall; Fibromyalgia; GERD (gastroesophageal reflux disease); Heart burn; Hepatitis C; Hypertension; Indigestion; Infectious disease; Neuropathy (HCC); On home oxygen therapy; Other specified symptom associated with female genital organs; Pain (9/13/2016); PND (post-nasal drip); Pneumonia; Psychiatric problem (9/13/2016); RLS (restless legs syndrome); Sleep apnea; and Unspecified urinary incontinence.    ROS  No chest pain, no shortness of breath, no abdominal pain       Objective:     Blood pressure 122/72, pulse 81, temperature 36.4 °C (97.6 °F), temperature source Temporal, resp. rate 18, SpO2 94 %, not currently breastfeeding. There is no height or weight on file to calculate BMI.   Physical Exam: Frail, in WC, on oxygen  Constitutional: Alert, no distress.  Skin: Warm, dry, good turgor, no rashes in visible areas. Plugged nodule in right groin, skin breakdown right upper thigh superficial. Hemorrhoids external not thrombosed.   Eye: Equal, round and reactive, conjunctiva clear, lids normal.  ENMT: Lips without lesions, good dentition, oropharynx clear.  Neck: Trachea midline, no masses, no thyromegaly. No cervical or supraclavicular lymphadenopathy  Respiratory: Unlabored respiratory effort, lungs clear to auscultation, no wheezes, no ronchi.  Cardiovascular: Normal S1, S2, no murmur, no edema.  Abdomen: Soft, non-tender, no masses, no hepatosplenomegaly.  Psych: Alert and oriented x3, normal affect and mood.        Assessment and Plan:   The following treatment plan was discussed    1. Need for  vaccination  - INFLUENZA VACCINE, HIGH DOSE (65+ ONLY)    2. Chronic use of opiate drugs therapeutic purposes  Refills done for 3 months.     3. Hammer toes of both feet  - REFERRAL TO HOME HEALTH    4. Primary osteoarthritis involving multiple joints  - REFERRAL TO HOME HEALTH  - Consent for Opiate Prescription  - oxyCODONE immediate release (ROXICODONE) 10 MG immediate release tablet; Take 1 Tab by mouth every four hours as needed for Moderate Pain for up to 30 days.  Dispense: 90 Tab; Refill: 0    5. Skin ulcer, limited to breakdown of skin (HCC)  Continue with daily dressing changes. - REFERRAL TO HOME HEALTH    6. CKD (chronic kidney disease), stage IV (HCC)  Continue follow up with nephrology.     7. Chronic pain syndrome  Referral to sports medicine/orthopedic specialist for joint injections.   - Consent for Opiate Prescription  - oxyCODONE immediate release (ROXICODONE) 10 MG immediate release tablet; Take 1 Tab by mouth every four hours as needed for Moderate Pain for up to 30 days.  Dispense: 90 Tab; Refill: 0    8. Lumbar spondylosis with myelopathy  Referral to sports medicine/orthopedic specialist for joint injections.   Referral to home health for PT   - Consent for Opiate Prescription  - oxyCODONE immediate release (ROXICODONE) 10 MG immediate release tablet; Take 1 Tab by mouth every four hours as needed for Moderate Pain for up to 30 days.  Dispense: 90 Tab; Refill: 0      Followup: No Follow-up on file.

## 2018-10-10 NOTE — LETTER
October 10, 2018         Priya Callahan  [address]  [place] [address]        Dear Priya:      Below are the results from your recent visit:    Resulted Orders   DX-JOINT SURVEY-FEET SINGLE VIEW    Narrative    10/10/2018 2:10 PM    HISTORY/REASON FOR EXAM:  Atraumatic Pain/Swelling/Deformity.  Bilateral foot pain    TECHNIQUE/EXAM DESCRIPTION AND NUMBER OF VIEWS:  Joint survey, single view of the feet.    COMPARISON: None    FINDINGS:  There is right-sided hallux valgus. There is mild osteophyte formation and sclerosis along the right first metatarsal phalangeal joint. There is also some small osteophytes formation along the left metatarsal phalangeal joint. No fracture or dislocation.   No bony erosions or soft tissue calcifications.      Impression    Mild bilateral first metatarsophalangeal joint osteoarthritis.    Right-sided hallux valgus.     The xray shows mild arthritis and bone spurs and     If you have any questions or concerns, please don't hesitate to call.        Sincerely,      Kavitha Mccall M.D.    Electronically Signed

## 2018-10-23 ENCOUNTER — TELEMEDICINE2 (OUTPATIENT)
Dept: NEPHROLOGY | Facility: MEDICAL CENTER | Age: 68
End: 2018-10-23
Payer: MEDICARE

## 2018-10-23 VITALS
WEIGHT: 220 LBS | OXYGEN SATURATION: 96 % | HEIGHT: 64 IN | BODY MASS INDEX: 37.56 KG/M2 | HEART RATE: 88 BPM | RESPIRATION RATE: 20 BRPM | TEMPERATURE: 97.7 F | DIASTOLIC BLOOD PRESSURE: 62 MMHG | SYSTOLIC BLOOD PRESSURE: 118 MMHG

## 2018-10-23 DIAGNOSIS — N18.30 CKD (CHRONIC KIDNEY DISEASE) STAGE 3, GFR 30-59 ML/MIN (HCC): ICD-10-CM

## 2018-10-23 DIAGNOSIS — I10 ESSENTIAL HYPERTENSION: ICD-10-CM

## 2018-10-23 DIAGNOSIS — N17.9 AKI (ACUTE KIDNEY INJURY) (HCC): ICD-10-CM

## 2018-10-23 PROCEDURE — 99214 OFFICE O/P EST MOD 30 MIN: CPT | Performed by: INTERNAL MEDICINE

## 2018-10-23 ASSESSMENT — ENCOUNTER SYMPTOMS
SHORTNESS OF BREATH: 1
CHILLS: 0
FEVER: 0
NAUSEA: 0
HYPERTENSION: 1
VOMITING: 0
COUGH: 0

## 2018-10-23 NOTE — PROGRESS NOTES
"Subjective:      Priya Callahan is a 68 y.o. female who presents with Hypertension and Chronic Kidney Disease            Hypertension   This is a chronic problem. The current episode started more than 1 year ago. The problem is unchanged. The problem is controlled. Associated symptoms include malaise/fatigue and shortness of breath. Pertinent negatives include no chest pain or peripheral edema. Risk factors for coronary artery disease include diabetes mellitus, obesity and post-menopausal state. Past treatments include ACE inhibitors. The current treatment provides significant improvement. There are no compliance problems.  Hypertensive end-organ damage includes kidney disease. Identifiable causes of hypertension include chronic renal disease.   Chronic Kidney Disease   This is a chronic problem. The current episode started more than 1 year ago. The problem occurs constantly. The problem has been gradually improving. Pertinent negatives include no chest pain, chills, coughing, fever, nausea or vomiting.       Review of Systems   Constitutional: Positive for malaise/fatigue. Negative for chills and fever.   Respiratory: Positive for shortness of breath. Negative for cough.    Cardiovascular: Negative for chest pain and leg swelling.   Gastrointestinal: Negative for nausea and vomiting.   Genitourinary: Negative for dysuria, frequency and urgency.          Objective:     /62 (BP Location: Left arm, Patient Position: Sitting)   Pulse 88   Temp 36.5 °C (97.7 °F)   Resp 20   Ht 1.626 m (5' 4\")   Wt 99.8 kg (220 lb)   SpO2 96%   BMI 37.76 kg/m²      Physical Exam   Constitutional: She is oriented to person, place, and time. She appears well-developed and well-nourished. No distress.   HENT:   Head: Normocephalic and atraumatic.   Right Ear: External ear normal.   Left Ear: External ear normal.   Nose: Nose normal.   Eyes: Conjunctivae are normal. Right eye exhibits no discharge. Left eye exhibits no " discharge. No scleral icterus.   Cardiovascular: Normal rate and regular rhythm.    Pulmonary/Chest: Effort normal and breath sounds normal. No respiratory distress.   Musculoskeletal: She exhibits no edema.   Neurological: She is alert and oriented to person, place, and time.   Skin: Skin is warm. She is not diaphoretic.   Psychiatric: She has a normal mood and affect. Her behavior is normal.   Nursing note and vitals reviewed.   exam was done by the help of the nurse at the remote facility.              Assessment/Plan:     1. HAN (acute kidney injury) ([facility])  Sec to Low BP.  Improved     2. CKD (chronic kidney disease) stage 3, GFR 30-59 ml/min ([facility])  no uremic symptoms  Renal dose all meds  Avoid nephrotoxins  Check labs in 6 months      3. Essential hypertension  Controlled   Continue Low Na diet

## 2018-10-25 ENCOUNTER — TELEPHONE (OUTPATIENT)
Dept: MEDICAL GROUP | Facility: PHYSICIAN GROUP | Age: 68
End: 2018-10-25

## 2018-10-30 NOTE — PROGRESS NOTES
1. Attempt #:1    2. HealthConnect Verified: yes    3. Verify PCP: yes    4. Review Care Team: yes    5. WebIZ Checked & Epic Updated: Yes    6. Reviewed/Updated the following with patient:       •   Communication Preference Obtained? YES       •   Preferred Pharmacy? YES       •   Preferred Lab? YES       •   Family History (document living status of immediate family members and if + hx of cancer, diabetes, hypertension, hyperlipidemia, heart attack, stroke) YES    7. Annual Wellness Visit Scheduling  · Scheduling Status:Scheduled     8. Care Gap Scheduling (Attempt to Schedule EACH Overdue Care Gap!)     Health Maintenance Due   Topic Date Due   • RETINAL SCREENING  09/26/1967   • PFT SCREENING-FEV1 AND FEV/FVC RATIO / SPIROMETRY SHOULD BE PERFORMED ANNUALLY  09/26/1967   • IMM HEP B VACCINE (1 of 3 - Risk 3-dose series) 09/26/1968   • IMM DTaP/Tdap/Td Vaccine (1 - Tdap) 09/26/1968   • MAMMOGRAM  02/17/2016   • DIABETES MONOFILAMENT / LE EXAM  06/07/2017   • Annual Wellness Visit  09/28/2017   • IMM ZOSTER VACCINES (2 of 3) 11/20/2017   • COLON CANCER SCREENING ANNUAL FIT  04/03/2018   • URINE ACR / MICROALBUMIN  09/25/2018        Scheduled patient for Annual Wellness Visit     9. [facility]: declined    10. [facility]: no    11. Virtual Visits: no    12. Opt In to Text Messages: yes    13. Patient was advised: “This is a free wellness visit. The provider will screen for medical conditions to help you stay healthy. If you have other concerns to address you may be asked to discuss these at a separate visit or there may be an additional fee.”     14. Patient was informed to arrive 15 min prior to their scheduled appointment and bring in their medication bottles.

## 2018-11-11 RX ORDER — BUDESONIDE AND FORMOTEROL FUMARATE DIHYDRATE 160; 4.5 UG/1; UG/1
AEROSOL RESPIRATORY (INHALATION)
Qty: 3 INHALER | Refills: 0 | Status: SHIPPED | OUTPATIENT
Start: 2018-11-11 | End: 2019-03-11 | Stop reason: SDUPTHER

## 2018-11-13 RX ORDER — BUDESONIDE AND FORMOTEROL FUMARATE DIHYDRATE 160; 4.5 UG/1; UG/1
AEROSOL RESPIRATORY (INHALATION)
Qty: 30.6 G | Refills: 0 | OUTPATIENT
Start: 2018-11-13

## 2018-11-14 ENCOUNTER — APPOINTMENT (OUTPATIENT)
Dept: RADIOLOGY | Facility: MEDICAL CENTER | Age: 68
End: 2018-11-14
Attending: FAMILY MEDICINE
Payer: MEDICARE

## 2018-12-05 RX ORDER — LACTULOSE 10 G/15ML
SOLUTION ORAL
Qty: 473 ML | Refills: 0 | Status: SHIPPED | OUTPATIENT
Start: 2018-12-05 | End: 2019-04-14

## 2018-12-05 NOTE — TELEPHONE ENCOUNTER
Was the patient seen in the last year in this department? Yes    Does patient have an active prescription for medications requested? No     Received Request Via: Pharmacy      Pt met protocol?: Yes    Pt last ov 10/2018

## 2018-12-14 RX ORDER — LISINOPRIL 40 MG/1
TABLET ORAL
Qty: 90 TAB | Refills: 1 | Status: ON HOLD | OUTPATIENT
Start: 2018-12-14 | End: 2019-01-30

## 2018-12-14 NOTE — TELEPHONE ENCOUNTER
Was the patient seen in the last year in this department? Yes    Does patient have an active prescription for medications requested? No     Received Request Via: Pharmacy      Pt met protocol?: Yes      OV 10/18    BP Readings from Last 1 Encounters:   10/23/18 118/62

## 2018-12-15 NOTE — TELEPHONE ENCOUNTER
Refill X 6 months, sent to pharmacy.Pt. Seen in the last 6 months per protocol.   Lab Results   Component Value Date/Time    SODIUM 139 10/10/2018 12:59 PM    POTASSIUM 4.8 10/10/2018 12:59 PM    CHLORIDE 108 10/10/2018 12:59 PM    CO2 19 (L) 10/10/2018 12:59 PM    GLUCOSE 204 (H) 10/10/2018 12:59 PM    BUN 36 (H) 10/10/2018 12:59 PM    CREATININE 1.59 (H) 10/10/2018 12:59 PM

## 2018-12-20 ENCOUNTER — OFFICE VISIT (OUTPATIENT)
Dept: MEDICAL GROUP | Facility: PHYSICIAN GROUP | Age: 68
End: 2018-12-20
Payer: MEDICARE

## 2018-12-20 VITALS
WEIGHT: 228 LBS | DIASTOLIC BLOOD PRESSURE: 74 MMHG | HEART RATE: 85 BPM | RESPIRATION RATE: 16 BRPM | OXYGEN SATURATION: 96 % | TEMPERATURE: 98.9 F | HEIGHT: 64 IN | SYSTOLIC BLOOD PRESSURE: 114 MMHG | BODY MASS INDEX: 38.93 KG/M2

## 2018-12-20 DIAGNOSIS — I11.0 HYPERTENSIVE HEART DISEASE WITH HEART FAILURE (HCC): Chronic | ICD-10-CM

## 2018-12-20 DIAGNOSIS — E78.2 MIXED HYPERLIPIDEMIA: Chronic | ICD-10-CM

## 2018-12-20 DIAGNOSIS — G47.34 NOCTURNAL HYPOXIA: ICD-10-CM

## 2018-12-20 DIAGNOSIS — M47.26 OTHER SPONDYLOSIS WITH RADICULOPATHY, LUMBAR REGION: ICD-10-CM

## 2018-12-20 DIAGNOSIS — M47.26 OSTEOARTHRITIS OF SPINE WITH RADICULOPATHY, LUMBAR REGION: ICD-10-CM

## 2018-12-20 DIAGNOSIS — J96.11 CHRONIC RESPIRATORY FAILURE WITH HYPOXIA (HCC): ICD-10-CM

## 2018-12-20 DIAGNOSIS — F51.01 PRIMARY INSOMNIA: ICD-10-CM

## 2018-12-20 DIAGNOSIS — I10 CHRONIC HYPERTENSION: Chronic | ICD-10-CM

## 2018-12-20 DIAGNOSIS — Z72.0 TOBACCO ABUSE DISORDER: ICD-10-CM

## 2018-12-20 DIAGNOSIS — K59.09 CHRONIC CONSTIPATION: ICD-10-CM

## 2018-12-20 DIAGNOSIS — J43.9 PULMONARY EMPHYSEMA, UNSPECIFIED EMPHYSEMA TYPE (HCC): Chronic | ICD-10-CM

## 2018-12-20 DIAGNOSIS — M20.41 HAMMER TOES OF BOTH FEET: ICD-10-CM

## 2018-12-20 DIAGNOSIS — Z79.891 CHRONIC USE OF OPIATE DRUGS THERAPEUTIC PURPOSES: ICD-10-CM

## 2018-12-20 DIAGNOSIS — M19.042 PRIMARY OSTEOARTHRITIS OF BOTH HANDS: ICD-10-CM

## 2018-12-20 DIAGNOSIS — M79.671 PAIN OF RIGHT HEEL: ICD-10-CM

## 2018-12-20 DIAGNOSIS — F33.1 MODERATE EPISODE OF RECURRENT MAJOR DEPRESSIVE DISORDER (HCC): Chronic | ICD-10-CM

## 2018-12-20 DIAGNOSIS — D63.8 ANEMIA OF CHRONIC DISEASE: Chronic | ICD-10-CM

## 2018-12-20 DIAGNOSIS — Z79.4 TYPE 2 DIABETES MELLITUS WITH MICROALBUMINURIA, WITH LONG-TERM CURRENT USE OF INSULIN (HCC): Chronic | ICD-10-CM

## 2018-12-20 DIAGNOSIS — G89.4 CHRONIC PAIN SYNDROME: Chronic | ICD-10-CM

## 2018-12-20 DIAGNOSIS — I10 ESSENTIAL HYPERTENSION: ICD-10-CM

## 2018-12-20 DIAGNOSIS — R91.8 PULMONARY NODULES: ICD-10-CM

## 2018-12-20 DIAGNOSIS — Z20.5 EXPOSURE TO HEPATITIS C: ICD-10-CM

## 2018-12-20 DIAGNOSIS — E55.9 VITAMIN D DEFICIENCY: Chronic | ICD-10-CM

## 2018-12-20 DIAGNOSIS — M20.42 HAMMER TOES OF BOTH FEET: ICD-10-CM

## 2018-12-20 DIAGNOSIS — N18.30 CHRONIC KIDNEY DISEASE, STAGE 3 (HCC): ICD-10-CM

## 2018-12-20 DIAGNOSIS — M19.041 PRIMARY OSTEOARTHRITIS OF BOTH HANDS: ICD-10-CM

## 2018-12-20 DIAGNOSIS — E03.9 HYPOTHYROIDISM, UNSPECIFIED TYPE: Chronic | ICD-10-CM

## 2018-12-20 DIAGNOSIS — M19.90 ARTHRITIS: ICD-10-CM

## 2018-12-20 DIAGNOSIS — R80.9 TYPE 2 DIABETES MELLITUS WITH MICROALBUMINURIA, WITH LONG-TERM CURRENT USE OF INSULIN (HCC): Chronic | ICD-10-CM

## 2018-12-20 DIAGNOSIS — B35.1 TOENAIL FUNGUS: ICD-10-CM

## 2018-12-20 DIAGNOSIS — E11.29 TYPE 2 DIABETES MELLITUS WITH MICROALBUMINURIA, WITH LONG-TERM CURRENT USE OF INSULIN (HCC): Chronic | ICD-10-CM

## 2018-12-20 DIAGNOSIS — G47.33 OSA (OBSTRUCTIVE SLEEP APNEA): Chronic | ICD-10-CM

## 2018-12-20 DIAGNOSIS — G62.9 NEUROPATHY: ICD-10-CM

## 2018-12-20 DIAGNOSIS — I48.91 ATRIAL FIBRILLATION, UNSPECIFIED TYPE (HCC): Chronic | ICD-10-CM

## 2018-12-20 PROBLEM — E06.9 THYROIDITIS: Status: RESOLVED | Noted: 2017-06-30 | Resolved: 2018-12-20

## 2018-12-20 PROBLEM — D72.829 LEUKOCYTOSIS: Status: RESOLVED | Noted: 2017-04-04 | Resolved: 2018-12-20

## 2018-12-20 PROBLEM — S82.409A TIBIA/FIBULA FRACTURE: Chronic | Status: RESOLVED | Noted: 2017-05-07 | Resolved: 2018-12-20

## 2018-12-20 PROBLEM — G89.29 CHRONIC PAIN OF LEFT ANKLE: Status: RESOLVED | Noted: 2017-09-28 | Resolved: 2018-12-20

## 2018-12-20 PROBLEM — D64.9 ANEMIA REQUIRING TRANSFUSIONS: Status: RESOLVED | Noted: 2017-05-27 | Resolved: 2018-12-20

## 2018-12-20 PROBLEM — L08.9 SKIN INFECTION: Status: RESOLVED | Noted: 2018-03-12 | Resolved: 2018-12-20

## 2018-12-20 PROBLEM — R49.0 HOARSE VOICE QUALITY: Status: RESOLVED | Noted: 2017-09-28 | Resolved: 2018-12-20

## 2018-12-20 PROBLEM — Z09 HOSPITAL DISCHARGE FOLLOW-UP: Status: RESOLVED | Noted: 2017-11-15 | Resolved: 2018-12-20

## 2018-12-20 PROBLEM — E87.5 HYPERKALEMIA: Status: RESOLVED | Noted: 2017-03-08 | Resolved: 2018-12-20

## 2018-12-20 PROBLEM — S82.209A TIBIA/FIBULA FRACTURE: Chronic | Status: RESOLVED | Noted: 2017-05-07 | Resolved: 2018-12-20

## 2018-12-20 PROBLEM — E80.6 HYPERBILIRUBINEMIA: Status: RESOLVED | Noted: 2017-04-04 | Resolved: 2018-12-20

## 2018-12-20 PROBLEM — E87.1 HYPONATREMIA: Status: RESOLVED | Noted: 2017-03-08 | Resolved: 2018-12-20

## 2018-12-20 PROBLEM — J44.1 ACUTE EXACERBATION OF CHRONIC OBSTRUCTIVE PULMONARY DISEASE (COPD) (HCC): Status: RESOLVED | Noted: 2017-05-08 | Resolved: 2018-12-20

## 2018-12-20 PROBLEM — L98.491 SKIN ULCER, LIMITED TO BREAKDOWN OF SKIN (HCC): Status: RESOLVED | Noted: 2018-04-10 | Resolved: 2018-12-20

## 2018-12-20 PROBLEM — N18.4 CKD (CHRONIC KIDNEY DISEASE), STAGE IV (HCC): Status: RESOLVED | Noted: 2017-04-04 | Resolved: 2018-12-20

## 2018-12-20 PROBLEM — L82.1 SEBORRHEIC KERATOSIS: Status: RESOLVED | Noted: 2017-09-28 | Resolved: 2018-12-20

## 2018-12-20 PROBLEM — J18.9 PNEUMONIA: Status: RESOLVED | Noted: 2017-05-08 | Resolved: 2018-12-20

## 2018-12-20 PROBLEM — N17.9 AKI (ACUTE KIDNEY INJURY) (HCC): Status: RESOLVED | Noted: 2017-04-04 | Resolved: 2018-12-20

## 2018-12-20 PROBLEM — R09.02 HYPOXIA: Status: RESOLVED | Noted: 2017-06-05 | Resolved: 2018-12-20

## 2018-12-20 PROBLEM — J96.00 ACUTE RESPIRATORY FAILURE REQUIRING REINTUBATION (HCC): Status: RESOLVED | Noted: 2017-06-22 | Resolved: 2018-12-20

## 2018-12-20 PROBLEM — M25.572 CHRONIC PAIN OF LEFT ANKLE: Status: RESOLVED | Noted: 2017-09-28 | Resolved: 2018-12-20

## 2018-12-20 PROCEDURE — G0439 PPPS, SUBSEQ VISIT: HCPCS | Performed by: FAMILY MEDICINE

## 2018-12-20 RX ORDER — ROSUVASTATIN CALCIUM 10 MG/1
10 TABLET, COATED ORAL EVERY EVENING
Qty: 90 TAB | Refills: 3 | Status: SHIPPED | OUTPATIENT
Start: 2018-12-20 | End: 2019-05-02 | Stop reason: SDUPTHER

## 2018-12-20 ASSESSMENT — PAIN SCALES - GENERAL: PAINLEVEL: NO PAIN

## 2018-12-20 ASSESSMENT — ACTIVITIES OF DAILY LIVING (ADL): BATHING_REQUIRES_ASSISTANCE: 0

## 2018-12-20 ASSESSMENT — ENCOUNTER SYMPTOMS: GENERAL WELL-BEING: POOR

## 2018-12-20 ASSESSMENT — PATIENT HEALTH QUESTIONNAIRE - PHQ9: CLINICAL INTERPRETATION OF PHQ2 SCORE: 0

## 2018-12-20 NOTE — ASSESSMENT & PLAN NOTE
Chronic condition, arthritis in multiple joints pain and swelling in her hands in the morning they are stiff.  She uses oxycodone immediate release every 4 hours for pain.

## 2018-12-20 NOTE — ASSESSMENT & PLAN NOTE
Chronic condition possibly due to worsening CKD FIT was negative.  Results for CHAITANYA CABELLO (MRN [id number]) as of 12/20/2018 14:38   Ref. Range 11/12/2017 15:50 5/16/2018 11:56 5/23/2018 09:56 7/3/2018 15:10 10/10/2018 12:59   Hemoglobin Latest Ref Range: 12.0 - 16.0 g/dL 11.9 (L) 10.7 (L) 10.8 (L) 10.8 (L) 10.1 (L)   Hematocrit Latest Ref Range: 37.0 - 47.0 % 35.0 (L) 33.2 (L) 34.1 (L) 32.4 (L) 32.1 (L)   MCV Latest Ref Range: 81.4 - 97.8 fL 89.3 100.3 (H) 100.6 (H) 96.7 101.3 (H)   B12 level is normal this year  I will recheck CBC and check a folate level.  Iron level is normal.

## 2018-12-20 NOTE — ASSESSMENT & PLAN NOTE
One episode of atrial fibrillation when admitted for copd exacerbation.   This was seen on eKG  Subs ekgs all in sinus rhythm. She notes very rate palpitations.   Will check 48 hr holter.   Not currently on any anticoagulation or asa.

## 2018-12-20 NOTE — ASSESSMENT & PLAN NOTE
Chronic condition  MRI lumbar spine 2017:   1.  Lumbar scoliosis convex left.  2.  Altered level degenerative disc disease and spondylotic changes notable for L2-3 borderline-mild central stenosis and L3-4 mild-moderate central stenosis.  3.  Additional degenerative and spondylotic changes as detailed for each level above in the body of report.

## 2018-12-20 NOTE — PROGRESS NOTES
Chief Complaint   Patient presents with   • Annual Exam         HPI:  Priya Callahan is a 68 y.o. here for Medicare Annual Wellness Visit     Patient Active Problem List    Diagnosis Date Noted   • Acute respiratory failure requiring reintubation (CMS-HCC)  (COPD exacerbation, infiltrates due to infectious vs inflammatory pneumonia?)  06/22/2017     Priority: High   • Tibia/fibula fracture 05/07/2017     Priority: High   • Hypertensive heart disease with heart failure ([facility]) 04/27/2015     Priority: High   • Chronic hypertension 06/30/2017     Priority: Medium   • Atrial fibrillation ([facility]) 06/30/2017     Priority: Medium   • Pneumonia 05/08/2017     Priority: Medium   • Acute exacerbation of chronic obstructive pulmonary disease (COPD) ([facility]) 05/08/2017     Priority: Medium   • HAN (acute kidney injury) ([facility]) 04/04/2017     Priority: Medium   • CKD (chronic kidney disease), stage IV ([facility]) 04/04/2017     Priority: Medium   • Hyperbilirubinemia 04/04/2017     Priority: Medium   • COPD (chronic obstructive pulmonary disease) ([facility]) 11/20/2013     Priority: Medium   • Iron deficiency anemia 10/30/2013     Priority: Medium   • Thyroiditis 06/30/2017     Priority: Low   • Diabetes mellitus, type II ([facility]) 04/04/2017     Priority: Low   • HLD (hyperlipidemia) 04/04/2017     Priority: Low   • Neuropathy ([facility]) 04/04/2017     Priority: Low   • Leukocytosis 04/04/2017     Priority: Low   • Chronic respiratory failure ([facility]) 04/28/2015     Priority: Low   • EDITH (obstructive sleep apnea) 01/27/2015     Priority: Low   • Chronic pain syndrome 08/28/2014     Priority: Low   • Vitamin D deficiency 06/12/2014     Priority: Low   • Hypothyroidism 04/01/2014     Priority: Low   • Osteoarthritis 02/25/2014     Priority: Low   • Insomnia 02/25/2014     Priority: Low   • Chronic kidney disease, stage 3 ([facility]) 01/06/2014     Priority: Low   • Major depressive disorder (CMS-[facility]) 11/20/2013     Priority: Low   • HTN (hypertension)  11/20/2013     Priority: Low   • Morbid obesity ([facility]) 10/30/2013     Priority: Low   • Hammer toes of both feet 10/10/2018   • Skin ulcer, limited to breakdown of skin ([facility]) 04/10/2018   • Skin infection 03/12/2018   • Obesity (BMI 30-39.9) 02/14/2018   • Hospital discharge follow-up 11/15/2017   • Exposure to hepatitis C 11/15/2017   • Hoarse voice quality 09/28/2017   • Chronic pain of left ankle 09/28/2017   • Seborrheic keratosis 09/28/2017   • Hypoxia 06/05/2017   • Anemia of chronic disease 05/27/2017   • Anemia requiring transfusions 05/27/2017   • Hyponatremia 03/08/2017   • Hyperkalemia 03/08/2017   • Toenail fungus 11/21/2016   • Tobacco abuse disorder 09/27/2016   • Other spondylosis with radiculopathy, lumbar region 09/16/2016   • Myoclonic jerking 04/12/2016   • Bilateral hand pain 12/21/2015   • Primary osteoarthritis of both hands 12/21/2015   • Osteoarthritis of spine with radiculopathy, lumbar region 10/02/2015   • Osteoarthritis, knee 09/02/2015   • Chronic constipation 07/07/2015   • Pulmonary nodules 07/07/2015   • Chronic use of opiate drugs therapeutic purposes 01/29/2015   • Nocturnal hypoxia 07/21/2014   • Pain of right heel 07/21/2014   • Lumbar spondylosis with myelopathy 06/17/2014   • Chronic pain of right knee 02/25/2014   • Acute on chronic diastolic CHF (congestive heart failure), NYHA class 1 ([facility]) 11/20/2013       Current Outpatient Prescriptions   Medication Sig Dispense Refill   • lisinopril (PRINIVIL, ZESTRIL) 40 MG tablet TAKE 1 TABLET BY MOUTH DAILY 90 Tab 1   • lactulose 10 GM/15ML Solution TAKE 30 MLS (6 TEASPOONFULS) BY MOUTH TWO TIMES DAILY 473 mL 0   • SYMBICORT 160-4.5 MCG/ACT Aerosol INHALE 2 PUFFS BY MOUTH TWO TIMES DAILY 3 Inhaler 0   • oxyCODONE immediate release (ROXICODONE) 10 MG immediate release tablet Take 1 Tab by mouth every four hours as needed for Moderate Pain for up to 30 days. 90 Tab 0   • LANTUS SOLOSTAR 100 UNIT/ML Solution Pen-injector injection INJECT  5-20 UNITS SUBCUTANEOUSLY EVERY EVENING 15 mL 1   • clotrimazole-betamethasone (LOTRISONE) 1-0.05 % Cream Apply 1 g to affected area(s) 2 times a day. 1 Tube 6   • levothyroxine (SYNTHROID) 75 MCG Tab TAKE 1 TABLET BY MOUTH DAILY 90 Tab 1   • amitriptyline (ELAVIL) 50 MG Tab Take 1 Tab by mouth at bedtime as needed for Sleep. 90 Tab 3   • Nutritional Supplements (ANTIOXIDANTS PO) Take  by mouth.     • varenicline (CHANTIX CONTINUING MONTH LUANN) 1 MG tablet Take 1 Tab by mouth 2 times a day. 60 Tab 3   • DULoxetine (CYMBALTA) 60 MG Cap DR Particles delayed-release capsule Take 1 Cap by mouth every day. 90 Cap 3   • Diclofenac Sodium 1 % Gel Apply  to skin as directed.     • cyclobenzaprine (FLEXERIL) 10 MG Tab TAKE 1 TABLET BY MOUTH TWO TIMES DAILY AS NEEDED 30 Tab 0   • nystatin/triamcinolone (MYCOLOG) 593123-4.1 UNIT/GM-% Cream APPLY A THIN LAYER TO FUNGAL RASH ONCE DAILY AS NEEDED 30 g 2   • ipratropium (ATROVENT) 17 MCG/ACT Aero Soln Inhale 2 Puffs by mouth every 6 hours. (Patient not taking: Reported on 12/20/2018) 17 g 11   • benzonatate (TESSALON) 100 MG Cap Take 1 Cap by mouth 3 times a day as needed for Cough. 60 Cap 0     No current facility-administered medications for this visit.             Current supplements as per medication list.       Allergies: Vitamin c; Keflex; Codeine; Gabapentin; Lyrica; and Sudafed    Current social contact/activities:      She  reports that she has been smoking Cigarettes.  She has a 37.50 pack-year smoking history. She has never used smokeless tobacco. She reports that she does not drink alcohol or use drugs.  Ready to quit: No  Counseling given: Not Answered      DPA/Advanced Directive:  Patient has Advanced Directive and Living Will, but it is not on file. Instructed to bring in a copy to scan into their chart.    ROS:    Gait: Uses a walker  Ostomy: No  Other tubes: No  Amputations: No  Chronic oxygen use: Yes  Last eye exam: 1/1/2017  Wears hearing aids: No   : Denies  any urinary leakage during the last 6 months    Screening:    Depression Screening    Little interest or pleasure in doing things?  0 - not at all  Feeling down, depressed , or hopeless? 0 - not at all  Patient Health Questionnaire Score: 0     If depressive symptoms identified deferred to follow up visit unless specifically addressed in assessment and plan.    Interpretation of PHQ-9 Total Score   Score Severity   1-4 No Depression   5-9 Mild Depression   10-14 Moderate Depression   15-19 Moderately Severe Depression   20-27 Severe Depression    Screening for Cognitive Impairment    Three Minute Recall (leader, season, table) 3/3    Denny clock face with all 12 numbers and set the hands to show 10 past 11.  Yes    Cognitive concerns identified deferred for follow up unless specifically addressed in assessment and plan.    Fall Risk Assessment    Has the patient had two or more falls in the last year or any fall with injury in the last year?  No    Safety Assessment    Throw rugs on floor.  No  Handrails on all stairs.  No  Good lighting in all hallways.  Yes  Difficulty hearing.  No  Patient counseled about all safety risks that were identified.    Functional Assessment ADLs    Are there any barriers preventing you from cooking for yourself or meeting nutritional needs?  No.    Are there any barriers preventing you from driving safely or obtaining transportation?  Yes. Has a   Are there any barriers preventing you from using a telephone or calling for help?  No.    Are there any barriers preventing you from shopping?  No.    Are there any barriers preventing you from taking care of your own finances?  No.    Are there any barriers preventing you from managing your medications?  No.    Are there any barriers preventing you from showering, bathing or dressing yourself?  No.    Are you currently engaging in any exercise or physical activity?  No.     What is your perception of your health?  Poor.      Health  Maintenance Summary                RETINAL SCREENING Overdue 9/26/1967     PFT SCREENING-FEV1 AND FEV/FVC RATIO / SPIROMETRY SHOULD BE PERFORMED ANNUALLY Overdue 9/26/1967     IMM HEP B VACCINE Overdue 9/26/1968     IMM DTaP/Tdap/Td Vaccine Overdue 9/26/1968     MAMMOGRAM Overdue 2/17/2016      Done 2/17/2015 MA-SCREENING MAMMOGRAM W/ CAD    DIABETES MONOFILAMENT / LE EXAM Overdue 6/7/2017      Done 6/7/2016 AMB DIABETIC MONOFILAMENT LOWER EXTREMITY EXAM    Annual Wellness Visit Overdue 9/28/2017      Done 9/27/2016 Visit Dx: Medicare annual wellness visit, subsequent    IMM ZOSTER VACCINES Overdue 11/20/2017      Done 9/25/2017 Imm Admin: Zoster Vaccine Live (ZVL) (Zostavax)    COLON CANCER SCREENING ANNUAL FIT Overdue 4/3/2018      Done 4/3/2017 OCCULT BLOOD FECES IMMUNOASSAY     Patient has more history with this topic...    URINE ACR / MICROALBUMIN Overdue 9/25/2018      Done 9/25/2017 MICROALBUMIN CREAT RATIO URINE      Patient has more history with this topic...    A1C SCREENING Overdue 11/23/2018      Done 5/23/2018 HEMOGLOBIN A1C      Patient has more history with this topic...    URINE DRUG SCREEN Next Due 5/25/2019      Done 5/30/2018 [facility] SCREEN     Patient has more history with this topic...    FASTING LIPID PROFILE Next Due 7/24/2019      Done 7/24/2018 LIPID PROFILE      Patient has more history with this topic...    SERUM CREATININE Next Due 10/10/2019      Done 10/10/2018 BASIC METABOLIC PANEL      Patient has more history with this topic...    BONE DENSITY Next Due 9/5/2022      Done 9/5/2017 DS-BONE DENSITY STUDY (DEXA)          Patient Care Team:  Kavitha Mccall M.D. as PCP - General (Family Medicine)  Brian Olea M.D. as Consulting Physician (Oncology)  [facility]  as Home Health Provider (DME Supplier)  Ember Saul D.P.M. as Consulting Physician (Podiatry)  Terry Freeman M.D. as Consulting Physician (Orthopaedics)        Social History   Substance  Use Topics   • Smoking status: Current Every Day Smoker     Packs/day: 0.75     Years: 50.00     Types: Cigarettes     Last attempt to quit: 3/12/2018   • Smokeless tobacco: Never Used   • Alcohol use No     Family History   Problem Relation Age of Onset   • Lung Disease Mother    • Alcohol/Drug Mother    • Heart Disease Mother    • Cancer Father    • Cancer Brother    • Diabetes Brother    • Diabetes Maternal Grandmother    • Stroke Paternal Grandmother    • Heart Disease Paternal Grandmother      She  has a past medical history of Arthritis; ASTHMA; Backpain; Breath shortness; Bronchitis; Congestive heart failure (HCC) (2013); COPD; Diabetes; Disorder of thyroid; Fall; Fibromyalgia; GERD (gastroesophageal reflux disease); Heart burn; Hepatitis C; Hypertension; Indigestion; Infectious disease; Neuropathy (HCC); On home oxygen therapy; Other specified symptom associated with female genital organs; Pain (9/13/2016); PND (post-nasal drip); Pneumonia; Psychiatric problem (9/13/2016); RLS (restless legs syndrome); Sleep apnea; and Unspecified urinary incontinence.   Past Surgical History:   Procedure Laterality Date   • ANKLE ORIF Left 5/8/2017    Procedure: ANKLE ORIF;  Surgeon: Rico Gtz M.D.;  Location: [facility];  Service:    • ID DSTR NROLYTC AGNT PARVERTEB FCT SNGL LMBR/SACRAL Left 9/16/2016    Procedure: NEURO DEST FACET L/S W/IG SNGL - L3-S1;  Surgeon: Xavier Arteaga D.O.;  Location: SURGERY [facility]S;  Service: Pain Management   • ID DSTR NROLYTC AGNT PARVERTEB FCT ADDL LMBR/SACRAL  9/16/2016    Procedure: NEURO DEST FACET L/S W/IG ADDL;  Surgeon: Xavier Arteaga D.O.;  Location: SURGERY SURGICAL [facility] ORS;  Service: Pain Management   • ID DSTR NROLYTC AGNT PARVERTEB FCT ADDL LMBR/SACRAL  9/16/2016    Procedure: NEURO DEST FACET L/S W/IG ADDL;  Surgeon: Xavier Arteaga D.O.;  Location: SURGERY SURGICAL [facility] ORS;  Service: Pain Management   • PB FLUOROSCOPIC GUIDANCE  NEEDLE PLACEMENT  9/16/2016    Procedure: FLOURO GUIDE NEEDLE PLACEMENT;  Surgeon: Xavier Arteaga D.O.;  Location: SURGERY SURGICAL [facility] ORS;  Service: Pain Management   • WA DSTR NROLYTC AGNT PARVERTEB FCT SNGL LMBR/SACRAL Right 11/6/2015    Procedure: NEURO DEST FACET L/S W/IG SNGL - L3-S1;  Surgeon: Xavier Arteaga D.O.;  Location: SURGERY SURGICAL [facility] ORS;  Service: Pain Management   • WA DSTR NROLYTC AGNT PARVERTEB FCT ADDL LMBR/SACRAL  11/6/2015    Procedure: NEURO DEST FACET L/S W/IG ADDL;  Surgeon: Xavier Arteaga D.O.;  Location: SURGERY SURGICAL [facility] OR[facility];  Service: Pain Management   • PB INJECT RX OTHER PERIPH NERVE  11/6/2015    Procedure: NEUROLYTIC DEST-OTHER NERVE;  Surgeon: Xavier Arteaga D.O.;  Location: SURGERY SURGICAL [facility];  Service: Pain Management   • WA DSTR NROLYTC AGNT PARVERTEB FCT ADDL LMBR/SACRAL  11/6/2015    Procedure: NEURO DEST FACET L/S W/IG ADDL;  Surgeon: Xavier Arteaga D.O.;  Location: SURGERY SURGICAL [facility];  Service: Pain Management   • WA DSTR NROLYTC AGNT PARVERTEB FCT SNGL LMBR/SACRAL Left 10/2/2015    Procedure: NEURO DEST FACET L/S W/IG SNGL - L3-S1;  Surgeon: Xavier Arteaga D.O.;  Location: SURGERY SURGICAL [facility];  Service: Pain Management   • WA DSTR NROLYTC AGNT PARVERTEB FCT ADDL LMBR/SACRAL  10/2/2015    Procedure: NEURO DEST FACET L/S W/IG ADDL;  Surgeon: Xavier Arteaga D.O.;  Location: SURGERY SURGICAL [facility];  Service: Pain Management   • PB INJECT RX OTHER PERIPH NERVE  10/2/2015    Procedure: NEUROLYTIC DEST-OTHER NERVE;  Surgeon: Xavier Arteaga D.O.;  Location: SURGERY SURGICAL [facility];  Service: Pain Management   • WA DSTR NROLYTC AGNT PARVERTEB FCT ADDL LMBR/SACRAL  10/2/2015    Procedure: NEURO DEST FACET L/S W/IG ADDL;  Surgeon: Xavier Arteaga D.O.;  Location: SURGERY SURGICAL [facility];  Service: Pain Management   • PB INJ DX/THER AGNT PARAVERT FACET JOINT, OBED* Left 7/17/2015    Procedure: INJ PARA FACET L/S 1  "LVL W/IG - L3-S1;  Surgeon: Xavier Arteaga D.O.;  Location: SURGERY [facility] ORS;  Service: Pain Management   • PB INJ DX/THER AGNT PARAVERT FACET JOINT, OBED*  7/17/2015    Procedure: INJ PARA FACET L/S 2D LVL W/IG;  Surgeon: Xavier Arteaga D.O.;  Location: SURGERY [facility] ORS;  Service: Pain Management   • PB INJ DX/THER AGNT PARAVERT FACET JOINT, OBED*  7/17/2015    Procedure: INJ PARA FACET L/S 3D LVL W/IG;  Surgeon: Xavier Arteaga D.O.;  Location: SURGERY [facility] ORS;  Service: Pain Management   • PB INJECT NERV BLCK,OTHR PERIPH NERV  7/17/2015    Procedure: INJ-ANES AGENT-OTHER PHER.NRVE;  Surgeon: Xavier Arteaga D.O.;  Location: SURGERY [facility] ORS;  Service: Pain Management   • PB INJ DX/THER AGNT PARAVERT FACET JOINT, OBED* Left 7/10/2015    Procedure: INJ PARA FACET L/S 1 LVL W/IG - L3-S1;  Surgeon: Xavier Arteaga D.O.;  Location: SURGERY [facility] ORS;  Service: Pain Management   • PB INJ DX/THER AGNT PARAVERT FACET JOINT, OBED*  7/10/2015    Procedure: INJ PARA FACET L/S 2D LVL W/IG;  Surgeon: Xavier Arteaga D.O.;  Location: [facility] ORS;  Service: Pain Management   • PB INJ DX/THER AGNT PARAVERT FACET JOINT, OBED*  7/10/2015    Procedure: INJ PARA FACET L/S 3D LVL W/IG;  Surgeon: Xavier Arteaga D.O.;  Location: SURGERY [facility] ORS;  Service: Pain Management   • PB INJECT NERV BLCK,OTHR PERIPH NERV  7/10/2015    Procedure: INJ-ANES AGENT-OTHER PHER.NRVE;  Surgeon: Xavier Arteaga D.O.;  Location: SURGERY [facility] ORS;  Service: Pain Management   • GYN SURGERY      hysterectomy    • LUMPECTOMY Left     Left breast   • OTHER ORTHOPEDIC SURGERY      L knee replacement    • OTHER ORTHOPEDIC SURGERY      R carpal tunnel    • US-NEEDLE CORE BX-BREAST PANEL         Exam:   Blood pressure 114/74, pulse 85, temperature 37.2 °C (98.9 °F), temperature source Temporal, resp. rate 16, height 1.626 m (5' 4\"), weight 103.4 kg (228 lb), SpO2 96 %, " not currently breastfeeding. Body mass index is 39.14 kg/m².    Hearing good.    Dentition good  Alert, oriented in no acute distress.  Eye contact is good, speech goal directed, affect calm    Assessment and Plan. The following treatment and monitoring plan is recommended:    Vitamin D deficiency  Chronic condition controlled on vitamin D supplement.    Toenail fungus  Chronic condition toenails are thickened patient followed podiatry.    Pulmonary nodules  Chronic condition, encouraged her to follow up with CT     Primary osteoarthritis of both hands  Chronic condition, arthritis in multiple joints pain and swelling in her hands in the morning they are stiff.  She uses oxycodone immediate release every 4 hours for pain.    Pain of right heel  Chronic condition patient has pain of right heel referral to podiatry done today.    Other spondylosis with radiculopathy, lumbar region  Chronic condition  MRI lumbar spine 2017:   1.  Lumbar scoliosis convex left.  2.  Altered level degenerative disc disease and spondylotic changes notable for L2-3 borderline-mild central stenosis and L3-4 mild-moderate central stenosis.  3.  Additional degenerative and spondylotic changes as detailed for each level above in the body of report.    Osteoarthritis of spine with radiculopathy, lumbar region  Osteoarthritis of spine lumbar stenosis lumbar radiculopathy uses a wheelchair more continues with physical therapy has been evaluated by pain management by Dr. Arteaga patient is having frequent falls which resulted in left tib-fib and ankle fractures.    BMI 39.0-39.9,adult  Chronic condition.  Advised on diet, weight loss, portion control    Anemia of chronic disease  Chronic condition possibly due to worsening CKD FIT was negative.  Results for CHAITANYA CABELLO (MRN [id number]) as of 12/20/2018 14:38   Ref. Range 11/12/2017 15:50 5/16/2018 11:56 5/23/2018 09:56 7/3/2018 15:10 10/10/2018 12:59   Hemoglobin Latest Ref Range: 12.0  - 16.0 g/dL 11.9 (L) 10.7 (L) 10.8 (L) 10.8 (L) 10.1 (L)   Hematocrit Latest Ref Range: 37.0 - 47.0 % 35.0 (L) 33.2 (L) 34.1 (L) 32.4 (L) 32.1 (L)   MCV Latest Ref Range: 81.4 - 97.8 fL 89.3 100.3 (H) 100.6 (H) 96.7 101.3 (H)   B12 level is normal this year  I will recheck CBC and check a folate level.  Iron level is normal.    Hypothyroidism  Chronic condition patient continues on levothyroxine 75 mcg with normal TSH.    Atrial fibrillation ([facility]) ([facility])  One episode of atrial fibrillation when admitted for copd exacerbation.   This was seen on eKG  Subs ekgs all in sinus rhythm. She notes very rate palpitations.   Will check 48 hr holter.   Not currently on any anticoagulation or asa.     Tobacco abuse disorder  Continues to smoke, encouraged to quit, she is advised she can use patches with chantx.     Chronic constipation  Chronic condition no changes.     Chronic hypertension  Chronic controlled.     Chronic kidney disease, stage 3  Stable.     Chronic pain syndrome  No acute changes.     Chronic respiratory failure ([facility])  Continuous oxygen supplementation    Chronic use of opiate drugs therapeutic purposes  No acute changes.     COPD (chronic obstructive pulmonary disease) ([facility]) (HCC)  Chronic condtion  Continue oxygen stop smoking    Diabetes mellitus, type II ([facility])  Well controlled.     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 Not on a statin  Will start today.     HTN (hypertension)  Well controlled on lisinopril 40 mg.     Hypertensive heart disease with heart failure ([facility])  Chronic condition, reports weight gain of 25 lbs in last 3 months.   No increased oxygen req. Went down actually  Has a wet productive cough she atributes to tobacco smoking  Has no swelling in legs.   No PND  No orthopnea.     Insomnia  Chronic condition, on trazodone.      Major depressive disorder (CMS-HCC)  Chronic condition, controlled on med  Has ptsd    Neuropathy (CMS-HCC)  In hands is not on gabapentin due to myoconic jerks with gabapentin is on elavil.     Nocturnal hypoxia  No acute changes  3.5L oxygen at night  Does not use her cpap.     EDITH (obstructive sleep apnea)  cpap does not fit her, she cannot sleep with it on.   Uses oxygen supplementation continuously.     Arthritis  Hands, knees, back, feet, shoulders.   Left hip  ckd so can't take NSAIDS  Is on chronic pain medication      Services suggested: No services needed at this time  Health Care Screening: Age-appropriate preventive services recommended by USPTF and ACIP covered by Medicare were discussed today. Services ordered if indicated and agreed upon by the patient.  Referrals offered: Community-based lifestyle interventions to reduce health risks and promote self-management and wellness, fall prevention, nutrition, physical activity, tobacco-use cessation, weight loss, and mental health services as per orders if indicated.    Discussion today about general wellness and lifestyle habits:    · Prevent falls and reduce trip hazards; Cautioned about securing or removing rugs.  · Have a working fire alarm and carbon monoxide detector;   · Engage in regular physical activity and social activities     Follow-up: No Follow-up on file.

## 2018-12-20 NOTE — ASSESSMENT & PLAN NOTE
Hands, knees, back, feet, shoulders.   Left hip  ckd so can't take NSAIDS  Is on chronic pain medication

## 2018-12-20 NOTE — ASSESSMENT & PLAN NOTE
Osteoarthritis of spine lumbar stenosis lumbar radiculopathy uses a wheelchair more continues with physical therapy has been evaluated by pain management by Dr. Arteaga patient is having frequent falls which resulted in left tib-fib and ankle fractures.

## 2018-12-20 NOTE — ASSESSMENT & PLAN NOTE
Chronic condition, reports weight gain of 25 lbs in last 3 months.   No increased oxygen req. Went down actually  Has a wet productive cough she atributes to tobacco smoking  Has no swelling in legs.   No PND  No orthopnea.

## 2018-12-31 ENCOUNTER — NON-PROVIDER VISIT (OUTPATIENT)
Dept: CARDIOLOGY | Facility: MEDICAL CENTER | Age: 68
End: 2018-12-31
Payer: MEDICARE

## 2018-12-31 DIAGNOSIS — R00.2 PALPITATIONS: ICD-10-CM

## 2018-12-31 DIAGNOSIS — I48.91 ATRIAL FIBRILLATION, UNSPECIFIED TYPE (HCC): Chronic | ICD-10-CM

## 2018-12-31 PROCEDURE — 93224 XTRNL ECG REC UP TO 48 HRS: CPT | Performed by: INTERNAL MEDICINE

## 2019-01-04 LAB — EKG IMPRESSION: NORMAL

## 2019-01-10 ENCOUNTER — OFFICE VISIT (OUTPATIENT)
Dept: MEDICAL GROUP | Facility: PHYSICIAN GROUP | Age: 69
End: 2019-01-10
Payer: MEDICARE

## 2019-01-10 VITALS
SYSTOLIC BLOOD PRESSURE: 118 MMHG | HEIGHT: 64 IN | OXYGEN SATURATION: 96 % | HEART RATE: 81 BPM | TEMPERATURE: 98 F | WEIGHT: 230 LBS | BODY MASS INDEX: 39.27 KG/M2 | DIASTOLIC BLOOD PRESSURE: 64 MMHG | RESPIRATION RATE: 16 BRPM

## 2019-01-10 DIAGNOSIS — G89.4 CHRONIC PAIN SYNDROME: Chronic | ICD-10-CM

## 2019-01-10 DIAGNOSIS — Z79.891 CHRONIC USE OF OPIATE DRUGS THERAPEUTIC PURPOSES: ICD-10-CM

## 2019-01-10 DIAGNOSIS — M15.9 PRIMARY OSTEOARTHRITIS INVOLVING MULTIPLE JOINTS: ICD-10-CM

## 2019-01-10 DIAGNOSIS — M47.16 LUMBAR SPONDYLOSIS WITH MYELOPATHY: ICD-10-CM

## 2019-01-10 PROCEDURE — 99214 OFFICE O/P EST MOD 30 MIN: CPT | Performed by: FAMILY MEDICINE

## 2019-01-10 RX ORDER — OXYCODONE HYDROCHLORIDE 10 MG/1
10 TABLET ORAL EVERY 4 HOURS PRN
Qty: 90 TAB | Refills: 0 | Status: SHIPPED | OUTPATIENT
Start: 2019-02-10 | End: 2019-01-10 | Stop reason: SDUPTHER

## 2019-01-10 RX ORDER — CLOBETASOL PROPIONATE 0.5 MG/G
CREAM TOPICAL
Qty: 1 TUBE | Refills: 5 | Status: ON HOLD | OUTPATIENT
Start: 2019-01-10 | End: 2019-02-12

## 2019-01-10 RX ORDER — OXYCODONE HYDROCHLORIDE 10 MG/1
10 TABLET ORAL EVERY 4 HOURS PRN
Qty: 90 TAB | Refills: 0 | Status: ON HOLD | OUTPATIENT
Start: 2019-03-10 | End: 2019-02-12

## 2019-01-10 RX ORDER — OXYCODONE HYDROCHLORIDE 10 MG/1
10 TABLET ORAL EVERY 4 HOURS PRN
Qty: 90 TAB | Refills: 0 | Status: SHIPPED | OUTPATIENT
Start: 2019-01-10 | End: 2019-01-10 | Stop reason: SDUPTHER

## 2019-01-11 NOTE — TELEPHONE ENCOUNTER
Was the patient seen in the last year in this department? Yes    Does patient have an active prescription for medications requested? No     Received Request Via: Pharmacy    Pt met protocol?: Yes     Last OV Yesterday

## 2019-01-13 RX ORDER — DULOXETIN HYDROCHLORIDE 60 MG/1
CAPSULE, DELAYED RELEASE ORAL
Qty: 90 CAP | Refills: 1 | Status: SHIPPED | OUTPATIENT
Start: 2019-01-13 | End: 2019-10-11 | Stop reason: SDUPTHER

## 2019-01-13 NOTE — PROGRESS NOTES
Subjective:   Priya Callahan is a 68 y.o. female here today for evaluation and management of:     Chronic use of opiate drugs therapeutic purposes  Patient presents for refill of oxycodone immediate release 10 mg which she takes now about 3 times a day. Decreased from 4 times a day for chronic pain due to arthritis and spondylosis. She is advised to wean down gradually as narcotic pain medication is no longer recommended for chronic pain.      and UDS with no concerns.   Other medications she uses for pain: flexeril, cymbalta, elavil, tylenol   Refills for 3 months provided.   Advised no alcohol or drugs.              Current medicines (including changes today)  Current Outpatient Prescriptions   Medication Sig Dispense Refill   • [START ON 3/10/2019] oxyCODONE immediate release (ROXICODONE) 10 MG immediate release tablet Take 1 Tab by mouth every four hours as needed for Moderate Pain for up to 30 days. 90 Tab 0   • clobetasol (TEMOVATE) 0.05 % Cream Use a thin film to affected skin twice a day as needed. 1 Tube 5   • rosuvastatin (CRESTOR) 10 MG Tab Take 1 Tab by mouth every evening. 90 Tab 3   • lisinopril (PRINIVIL, ZESTRIL) 40 MG tablet TAKE 1 TABLET BY MOUTH DAILY 90 Tab 1   • lactulose 10 GM/15ML Solution TAKE 30 MLS (6 TEASPOONFULS) BY MOUTH TWO TIMES DAILY 473 mL 0   • SYMBICORT 160-4.5 MCG/ACT Aerosol INHALE 2 PUFFS BY MOUTH TWO TIMES DAILY 3 Inhaler 0   • LANTUS SOLOSTAR 100 UNIT/ML Solution Pen-injector injection INJECT 5-20 UNITS SUBCUTANEOUSLY EVERY EVENING 15 mL 1   • nystatin/triamcinolone (MYCOLOG) 745396-5.1 UNIT/GM-% Cream APPLY A THIN LAYER TO FUNGAL RASH ONCE DAILY AS NEEDED 30 g 2   • levothyroxine (SYNTHROID) 75 MCG Tab TAKE 1 TABLET BY MOUTH DAILY 90 Tab 1   • amitriptyline (ELAVIL) 50 MG Tab Take 1 Tab by mouth at bedtime as needed for Sleep. 90 Tab 3   • Nutritional Supplements (ANTIOXIDANTS PO) Take  by mouth.     • ipratropium (ATROVENT) 17 MCG/ACT Aero Soln Inhale 2 Puffs by  "mouth every 6 hours. 17 g 11   • varenicline (CHANTIX CONTINUING MONTH LUANN) 1 MG tablet Take 1 Tab by mouth 2 times a day. 60 Tab 3   • DULoxetine (CYMBALTA) 60 MG Cap DR Particles delayed-release capsule Take 1 Cap by mouth every day. 90 Cap 3   • Diclofenac Sodium 1 % Gel Apply  to skin as directed.     • cyclobenzaprine (FLEXERIL) 10 MG Tab TAKE 1 TABLET BY MOUTH TWO TIMES DAILY AS NEEDED 30 Tab 0   • clotrimazole-betamethasone (LOTRISONE) 1-0.05 % Cream Apply 1 g to affected area(s) 2 times a day. 1 Tube 6   • benzonatate (TESSALON) 100 MG Cap Take 1 Cap by mouth 3 times a day as needed for Cough. 60 Cap 0     No current facility-administered medications for this visit.      She  has a past medical history of Arthritis; ASTHMA; Backpain; Breath shortness; Bronchitis; Congestive heart failure (HCC) (2013); COPD; Diabetes; Disorder of thyroid; Fall; Fibromyalgia; GERD (gastroesophageal reflux disease); Heart burn; Hepatitis C; Hypertension; Indigestion; Infectious disease; Neuropathy ([facility]); On home oxygen therapy; Other specified symptom associated with female genital organs; Pain (9/13/2016); PND (post-nasal drip); Pneumonia; Psychiatric problem (9/13/2016); RLS (restless legs syndrome); Sleep apnea; and Unspecified urinary incontinence.    ROS  No chest pain, no shortness of breath, no abdominal pain       Objective:     Blood pressure 118/64, pulse 81, temperature 36.7 °C (98 °F), temperature source Temporal, resp. rate 16, height 1.626 m (5' 4\"), weight 104.3 kg (230 lb), SpO2 96 %, not currently breastfeeding. Body mass index is 39.48 kg/m².   Physical Exam:  Constitutional: Alert, no distress.  Skin: Warm, dry, good turgor, no rashes in visible areas.  Eye: Equal, round and reactive, conjunctiva clear, lids normal.  ENMT: Lips without lesions, good dentition, oropharynx clear.  Neck: Trachea midline, no masses, no thyromegaly. No cervical or supraclavicular lymphadenopathy  Respiratory: Unlabored respiratory " effort, lungs clear to auscultation, no wheezes, no ronchi.  Cardiovascular: Normal S1, S2, no murmur, no edema.  Abdomen: Soft, non-tender, no masses, no hepatosplenomegaly.  Psych: Alert and oriented x3, normal affect and mood.        Assessment and Plan:   The following treatment plan was discussed    1. Chronic pain syndrome  - Consent for Opiate Prescription  - oxyCODONE immediate release (ROXICODONE) 10 MG immediate release tablet; Take 1 Tab by mouth every four hours as needed for Moderate Pain for up to 30 days.  Dispense: 90 Tab; Refill: 0    2. Primary osteoarthritis involving multiple joints  - oxyCODONE immediate release (ROXICODONE) 10 MG immediate release tablet; Take 1 Tab by mouth every four hours as needed for Moderate Pain for up to 30 days.  Dispense: 90 Tab; Refill: 0    3. Lumbar spondylosis with myelopathy  - oxyCODONE immediate release (ROXICODONE) 10 MG immediate release tablet; Take 1 Tab by mouth every four hours as needed for Moderate Pain for up to 30 days.  Dispense: 90 Tab; Refill: 0      Followup: Return in about 3 months (around 4/10/2019) for pain med refills, 6 month [facility] pain med refills.

## 2019-01-13 NOTE — ASSESSMENT & PLAN NOTE
Patient presents for refill of oxycodone immediate release 10 mg which she takes now about 3 times a day. Decreased from 4 times a day for chronic pain due to arthritis and spondylosis. She is advised to wean down gradually as narcotic pain medication is no longer recommended for chronic pain.      and UDS with no concerns.   Other medications she uses for pain: flexeril, cymbalta, elavil, tylenol   Refills for 3 months provided.   Advised no alcohol or drugs.

## 2019-01-15 NOTE — PROGRESS NOTES
Pt BS 67 at this time. Juice given. Pt requests no tab or IV meds at this time. Requests recheck in 45min.   no

## 2019-01-17 ENCOUNTER — OFFICE VISIT (OUTPATIENT)
Dept: URGENT CARE | Facility: PHYSICIAN GROUP | Age: 69
End: 2019-01-17
Payer: MEDICARE

## 2019-01-17 VITALS
TEMPERATURE: 98.1 F | OXYGEN SATURATION: 94 % | HEIGHT: 64 IN | SYSTOLIC BLOOD PRESSURE: 138 MMHG | WEIGHT: 230 LBS | DIASTOLIC BLOOD PRESSURE: 84 MMHG | HEART RATE: 66 BPM | BODY MASS INDEX: 39.27 KG/M2

## 2019-01-17 DIAGNOSIS — Z72.0 TOBACCO ABUSE DISORDER: ICD-10-CM

## 2019-01-17 DIAGNOSIS — J43.9 PULMONARY EMPHYSEMA, UNSPECIFIED EMPHYSEMA TYPE (HCC): Chronic | ICD-10-CM

## 2019-01-17 DIAGNOSIS — R06.2 WHEEZING: ICD-10-CM

## 2019-01-17 DIAGNOSIS — M54.31 SCIATICA OF RIGHT SIDE: Primary | ICD-10-CM

## 2019-01-17 PROCEDURE — 99214 OFFICE O/P EST MOD 30 MIN: CPT | Performed by: NURSE PRACTITIONER

## 2019-01-17 RX ORDER — METHYLPREDNISOLONE 4 MG/1
TABLET ORAL
Qty: 21 TAB | Refills: 0 | Status: ON HOLD | OUTPATIENT
Start: 2019-01-17 | End: 2019-01-30

## 2019-01-18 NOTE — PROGRESS NOTES
"Subjective:      Priya Callahan is a 68 y.o. female who presents with Hip Pain (Right buttoz pain-travels down to knee x yesterday morning )    Denies past medical, surgical or family history that is significant to today's problem.   RX or OTC medications-- reviewed with patient today.   Allergies   Allergen Reactions   • Vitamin C Diarrhea     \"violent diarrhea\"   • Keflex Rash     Severe rash, but TOLERATES PENICILLINS, Rocephin    • Codeine Nausea     Severe stomach pain   • Gabapentin Palpitations     Gets shaky and falls    • Lyrica Nausea     Nausea, dizzy   • Sudafed Nausea and Unspecified     Chest pain, nausea             HPI is a new problem.  Priya is a 68-year-old female who presents with right sided lower back pain with shooting pains down to her knee.  She arrives in a automatic wheelchair.  She says she has no change in her sedentary lifestyle or activity level.  She has no aggravating factors.  She takes OxyContin 3 times a day for chronic back pain.  She states her pain is currently 10/10.  Hurts worse when she tries to walk or move her right leg.  She has not tried any other treatments.  She has not had anything like this before.  She does have a stenosis in her lumbar spine that \"acts up\" on her-this feels slightly different.  No other aggravating or alleviating factors.    ROS  Denies numbness, tingling, weakness, fever, nausea, vomiting, chills     Objective:     /84   Pulse 66   Temp 36.7 °C (98.1 °F)   Ht 1.626 m (5' 4\")   Wt 104.3 kg (230 lb)   SpO2 94%   BMI 39.48 kg/m²      Physical Exam   Constitutional: She is oriented to person, place, and time. She appears well-developed and well-nourished.   HENT:   Head: Normocephalic.   Eyes: Pupils are equal, round, and reactive to light.   Neck: Normal range of motion.   Cardiovascular: Normal rate and regular rhythm.    Pulmonary/Chest: Effort normal and breath sounds normal.   Musculoskeletal: Normal range of motion. "   Neurological: She is alert and oriented to person, place, and time. No cranial nerve deficit or sensory deficit. Gait (antalgic, generalized weakness.  Requires 1 person assistance to get up on exam table.) abnormal.   Reflex Scores:       Patellar reflexes are 1+ on the right side and 1+ on the left side.       Achilles reflexes are 1+ on the right side and 1+ on the left side.  Skin: Skin is warm. Capillary refill takes less than 2 seconds.   Psychiatric: She has a normal mood and affect. Her behavior is normal. Thought content normal.   Nursing note and vitals reviewed.              Assessment/Plan:     1. Sciatica of right side  MethylPREDNISolone (MEDROL DOSEPAK) 4 MG Tablet Therapy Pack   2. Tobacco abuse disorder     3. Pulmonary emphysema, unspecified emphysema type (HCC)     4. Wheezing  MethylPREDNISolone (MEDROL DOSEPAK) 4 MG Tablet Therapy Pack     Educated in proper administration of medication(s) ordered today including safety, possible SE, risks, benefits, rationale and alternatives to therapy.   Return to clinic or PCP  5-7 days if current symptoms are not resolving in a satisfactory manner or sooner if new or worsening symptoms occur.   Patient was advised of signs and symptoms which would warrant further evaluation and /or emergent evaluation in ER.  Verbalized agreement with this treatment plan and seemed to understand without barriers. Questions were encouraged and answered to patients satisfaction.   Aftercare instructions were given to pt/ caregiver.

## 2019-01-21 ENCOUNTER — TELEPHONE (OUTPATIENT)
Dept: MEDICAL GROUP | Facility: PHYSICIAN GROUP | Age: 69
End: 2019-01-21

## 2019-01-21 RX ORDER — CYCLOBENZAPRINE HCL 5 MG
5-10 TABLET ORAL 3 TIMES DAILY PRN
Qty: 60 TAB | Refills: 1 | Status: SHIPPED | OUTPATIENT
Start: 2019-01-21 | End: 2019-02-19 | Stop reason: SDUPTHER

## 2019-01-21 NOTE — TELEPHONE ENCOUNTER
Please let patient know Rx for flexeril 5 mg sent to [facility]'s she can take one to two tablets by mouth 2-3 times a day as needed for sciatica. Also she can try massage, heat and ice and stretches for sciatica.   Kavitha Mccall M.D.      R leg pain. Dx as sciatica on 1/17/19 by Urgent Care.  Pt declined to be seen in Urgent Care due to lack of transportation.      Pt is requesting pain medication be called into her local pharmacy.    Please advise.

## 2019-01-22 ENCOUNTER — APPOINTMENT (OUTPATIENT)
Dept: RADIOLOGY | Facility: MEDICAL CENTER | Age: 69
DRG: 292 | End: 2019-01-22
Attending: EMERGENCY MEDICINE
Payer: MEDICARE

## 2019-01-22 ENCOUNTER — HOSPITAL ENCOUNTER (INPATIENT)
Facility: MEDICAL CENTER | Age: 69
LOS: 8 days | DRG: 292 | End: 2019-01-30
Attending: EMERGENCY MEDICINE | Admitting: HOSPITALIST
Payer: MEDICARE

## 2019-01-22 DIAGNOSIS — E87.5 HYPERKALEMIA: ICD-10-CM

## 2019-01-22 DIAGNOSIS — M54.41 ACUTE RIGHT-SIDED LOW BACK PAIN WITH RIGHT-SIDED SCIATICA: ICD-10-CM

## 2019-01-22 DIAGNOSIS — R53.81 PHYSICAL DECONDITIONING: ICD-10-CM

## 2019-01-22 PROBLEM — Z79.4 TYPE 2 DIABETES MELLITUS WITH HYPERGLYCEMIA, WITH LONG-TERM CURRENT USE OF INSULIN (HCC): Status: ACTIVE | Noted: 2017-04-04

## 2019-01-22 PROBLEM — E66.09 CLASS 2 OBESITY DUE TO EXCESS CALORIES WITHOUT SERIOUS COMORBIDITY WITH BODY MASS INDEX (BMI) OF 36.0 TO 36.9 IN ADULT: Status: ACTIVE | Noted: 2019-01-22

## 2019-01-22 PROBLEM — E11.65 TYPE 2 DIABETES MELLITUS WITH HYPERGLYCEMIA, WITH LONG-TERM CURRENT USE OF INSULIN (HCC): Status: ACTIVE | Noted: 2017-04-04

## 2019-01-22 PROBLEM — D53.9 MACROCYTIC ANEMIA: Status: ACTIVE | Noted: 2019-01-22

## 2019-01-22 LAB
ALBUMIN SERPL BCP-MCNC: 3.8 G/DL (ref 3.2–4.9)
ALBUMIN/GLOB SERPL: 1.4 G/DL
ALP SERPL-CCNC: 51 U/L (ref 30–99)
ALT SERPL-CCNC: 36 U/L (ref 2–50)
ANION GAP SERPL CALC-SCNC: 3 MMOL/L (ref 0–11.9)
APTT PPP: 31.1 SEC (ref 24.7–36)
AST SERPL-CCNC: 28 U/L (ref 12–45)
BASOPHILS # BLD AUTO: 0.3 % (ref 0–1.8)
BASOPHILS # BLD: 0.04 K/UL (ref 0–0.12)
BILIRUB SERPL-MCNC: 0.2 MG/DL (ref 0.1–1.5)
BNP SERPL-MCNC: 41 PG/ML (ref 0–100)
BUN SERPL-MCNC: 55 MG/DL (ref 8–22)
CALCIUM SERPL-MCNC: 9 MG/DL (ref 8.5–10.5)
CHLORIDE SERPL-SCNC: 104 MMOL/L (ref 96–112)
CK SERPL-CCNC: 47 U/L (ref 0–154)
CO2 SERPL-SCNC: 26 MMOL/L (ref 20–33)
CREAT SERPL-MCNC: 1.68 MG/DL (ref 0.5–1.4)
EKG IMPRESSION: NORMAL
EOSINOPHIL # BLD AUTO: 0.13 K/UL (ref 0–0.51)
EOSINOPHIL NFR BLD: 1 % (ref 0–6.9)
ERYTHROCYTE [DISTWIDTH] IN BLOOD BY AUTOMATED COUNT: 47.8 FL (ref 35.9–50)
GLOBULIN SER CALC-MCNC: 2.8 G/DL (ref 1.9–3.5)
GLUCOSE SERPL-MCNC: 107 MG/DL (ref 65–99)
HCT VFR BLD AUTO: 34.5 % (ref 37–47)
HGB BLD-MCNC: 10.9 G/DL (ref 12–16)
IMM GRANULOCYTES # BLD AUTO: 0.11 K/UL (ref 0–0.11)
IMM GRANULOCYTES NFR BLD AUTO: 0.9 % (ref 0–0.9)
INR PPP: 1.01 (ref 0.87–1.13)
LYMPHOCYTES # BLD AUTO: 1.05 K/UL (ref 1–4.8)
LYMPHOCYTES NFR BLD: 8.3 % (ref 22–41)
MCH RBC QN AUTO: 32.3 PG (ref 27–33)
MCHC RBC AUTO-ENTMCNC: 31.6 G/DL (ref 33.6–35)
MCV RBC AUTO: 102.4 FL (ref 81.4–97.8)
MONOCYTES # BLD AUTO: 0.97 K/UL (ref 0–0.85)
MONOCYTES NFR BLD AUTO: 7.7 % (ref 0–13.4)
NEUTROPHILS # BLD AUTO: 10.36 K/UL (ref 2–7.15)
NEUTROPHILS NFR BLD: 81.8 % (ref 44–72)
NRBC # BLD AUTO: 0 K/UL
NRBC BLD-RTO: 0 /100 WBC
PLATELET # BLD AUTO: 226 K/UL (ref 164–446)
PMV BLD AUTO: 9.3 FL (ref 9–12.9)
POTASSIUM SERPL-SCNC: 7.1 MMOL/L (ref 3.6–5.5)
PROT SERPL-MCNC: 6.6 G/DL (ref 6–8.2)
PROTHROMBIN TIME: 13.4 SEC (ref 12–14.6)
RBC # BLD AUTO: 3.37 M/UL (ref 4.2–5.4)
SODIUM SERPL-SCNC: 133 MMOL/L (ref 135–145)
WBC # BLD AUTO: 12.7 K/UL (ref 4.8–10.8)

## 2019-01-22 PROCEDURE — 83036 HEMOGLOBIN GLYCOSYLATED A1C: CPT

## 2019-01-22 PROCEDURE — 85730 THROMBOPLASTIN TIME PARTIAL: CPT

## 2019-01-22 PROCEDURE — 700102 HCHG RX REV CODE 250 W/ 637 OVERRIDE(OP): Performed by: EMERGENCY MEDICINE

## 2019-01-22 PROCEDURE — 82550 ASSAY OF CK (CPK): CPT

## 2019-01-22 PROCEDURE — 85610 PROTHROMBIN TIME: CPT

## 2019-01-22 PROCEDURE — 96374 THER/PROPH/DIAG INJ IV PUSH: CPT

## 2019-01-22 PROCEDURE — 99285 EMERGENCY DEPT VISIT HI MDM: CPT

## 2019-01-22 PROCEDURE — 51702 INSERT TEMP BLADDER CATH: CPT

## 2019-01-22 PROCEDURE — 84132 ASSAY OF SERUM POTASSIUM: CPT

## 2019-01-22 PROCEDURE — 96375 TX/PRO/DX INJ NEW DRUG ADDON: CPT

## 2019-01-22 PROCEDURE — 36415 COLL VENOUS BLD VENIPUNCTURE: CPT

## 2019-01-22 PROCEDURE — 304561 HCHG STAT O2

## 2019-01-22 PROCEDURE — 72131 CT LUMBAR SPINE W/O DYE: CPT

## 2019-01-22 PROCEDURE — 72192 CT PELVIS W/O DYE: CPT

## 2019-01-22 PROCEDURE — 700101 HCHG RX REV CODE 250: Performed by: EMERGENCY MEDICINE

## 2019-01-22 PROCEDURE — 83880 ASSAY OF NATRIURETIC PEPTIDE: CPT

## 2019-01-22 PROCEDURE — 80053 COMPREHEN METABOLIC PANEL: CPT

## 2019-01-22 PROCEDURE — 93005 ELECTROCARDIOGRAM TRACING: CPT | Performed by: EMERGENCY MEDICINE

## 2019-01-22 PROCEDURE — 770020 HCHG ROOM/CARE - TELE (206)

## 2019-01-22 PROCEDURE — 700111 HCHG RX REV CODE 636 W/ 250 OVERRIDE (IP): Performed by: EMERGENCY MEDICINE

## 2019-01-22 PROCEDURE — 303105 HCHG CATHETER EXTRA

## 2019-01-22 PROCEDURE — 99223 1ST HOSP IP/OBS HIGH 75: CPT | Performed by: HOSPITALIST

## 2019-01-22 PROCEDURE — 85025 COMPLETE CBC W/AUTO DIFF WBC: CPT

## 2019-01-22 RX ORDER — INSULIN GLARGINE 100 [IU]/ML
0.1 INJECTION, SOLUTION SUBCUTANEOUS EVERY EVENING
Status: DISCONTINUED | OUTPATIENT
Start: 2019-01-23 | End: 2019-01-30 | Stop reason: HOSPADM

## 2019-01-22 RX ORDER — FUROSEMIDE 10 MG/ML
40 INJECTION INTRAMUSCULAR; INTRAVENOUS ONCE
Status: COMPLETED | OUTPATIENT
Start: 2019-01-22 | End: 2019-01-22

## 2019-01-22 RX ORDER — ONDANSETRON 2 MG/ML
4 INJECTION INTRAMUSCULAR; INTRAVENOUS ONCE
Status: COMPLETED | OUTPATIENT
Start: 2019-01-22 | End: 2019-01-22

## 2019-01-22 RX ORDER — OXYCODONE HYDROCHLORIDE 5 MG/1
5 TABLET ORAL
Status: DISCONTINUED | OUTPATIENT
Start: 2019-01-22 | End: 2019-01-23

## 2019-01-22 RX ORDER — MORPHINE SULFATE 4 MG/ML
2 INJECTION, SOLUTION INTRAMUSCULAR; INTRAVENOUS
Status: DISCONTINUED | OUTPATIENT
Start: 2019-01-22 | End: 2019-01-23

## 2019-01-22 RX ORDER — AMITRIPTYLINE HYDROCHLORIDE 50 MG/1
50 TABLET, FILM COATED ORAL NIGHTLY PRN
Status: DISCONTINUED | OUTPATIENT
Start: 2019-01-22 | End: 2019-01-30 | Stop reason: HOSPADM

## 2019-01-22 RX ORDER — POLYETHYLENE GLYCOL 3350 17 G/17G
1 POWDER, FOR SOLUTION ORAL
Status: DISCONTINUED | OUTPATIENT
Start: 2019-01-22 | End: 2019-01-30 | Stop reason: HOSPADM

## 2019-01-22 RX ORDER — ONDANSETRON 4 MG/1
4 TABLET, ORALLY DISINTEGRATING ORAL EVERY 4 HOURS PRN
Status: DISCONTINUED | OUTPATIENT
Start: 2019-01-22 | End: 2019-01-30 | Stop reason: HOSPADM

## 2019-01-22 RX ORDER — DEXTROSE MONOHYDRATE 25 G/50ML
25 INJECTION, SOLUTION INTRAVENOUS ONCE
Status: COMPLETED | OUTPATIENT
Start: 2019-01-22 | End: 2019-01-22

## 2019-01-22 RX ORDER — AMOXICILLIN 250 MG
2 CAPSULE ORAL 2 TIMES DAILY
Status: DISCONTINUED | OUTPATIENT
Start: 2019-01-23 | End: 2019-01-30 | Stop reason: HOSPADM

## 2019-01-22 RX ORDER — CYCLOBENZAPRINE HCL 10 MG
5-10 TABLET ORAL 3 TIMES DAILY PRN
Status: DISCONTINUED | OUTPATIENT
Start: 2019-01-22 | End: 2019-01-24

## 2019-01-22 RX ORDER — BISACODYL 10 MG
10 SUPPOSITORY, RECTAL RECTAL
Status: DISCONTINUED | OUTPATIENT
Start: 2019-01-22 | End: 2019-01-30 | Stop reason: HOSPADM

## 2019-01-22 RX ORDER — ACETAMINOPHEN 325 MG/1
650 TABLET ORAL EVERY 6 HOURS PRN
Status: DISCONTINUED | OUTPATIENT
Start: 2019-01-22 | End: 2019-01-30 | Stop reason: HOSPADM

## 2019-01-22 RX ORDER — DEXTROSE MONOHYDRATE 25 G/50ML
25 INJECTION, SOLUTION INTRAVENOUS
Status: DISCONTINUED | OUTPATIENT
Start: 2019-01-22 | End: 2019-01-30 | Stop reason: HOSPADM

## 2019-01-22 RX ORDER — LORAZEPAM 2 MG/ML
1 INJECTION INTRAMUSCULAR ONCE
Status: COMPLETED | OUTPATIENT
Start: 2019-01-22 | End: 2019-01-22

## 2019-01-22 RX ORDER — HYDROMORPHONE HYDROCHLORIDE 1 MG/ML
1-2 INJECTION, SOLUTION INTRAMUSCULAR; INTRAVENOUS; SUBCUTANEOUS
Status: DISCONTINUED | OUTPATIENT
Start: 2019-01-22 | End: 2019-01-23

## 2019-01-22 RX ORDER — FUROSEMIDE 10 MG/ML
40 INJECTION INTRAMUSCULAR; INTRAVENOUS
Status: DISCONTINUED | OUTPATIENT
Start: 2019-01-23 | End: 2019-01-23

## 2019-01-22 RX ORDER — NICOTINE 21 MG/24HR
14 PATCH, TRANSDERMAL 24 HOURS TRANSDERMAL
Status: DISCONTINUED | OUTPATIENT
Start: 2019-01-23 | End: 2019-01-30 | Stop reason: HOSPADM

## 2019-01-22 RX ORDER — ROSUVASTATIN CALCIUM 20 MG/1
10 TABLET, COATED ORAL EVERY EVENING
Status: DISCONTINUED | OUTPATIENT
Start: 2019-01-23 | End: 2019-01-30 | Stop reason: HOSPADM

## 2019-01-22 RX ORDER — ONDANSETRON 2 MG/ML
4 INJECTION INTRAMUSCULAR; INTRAVENOUS EVERY 4 HOURS PRN
Status: DISCONTINUED | OUTPATIENT
Start: 2019-01-22 | End: 2019-01-30 | Stop reason: HOSPADM

## 2019-01-22 RX ORDER — OXYCODONE HYDROCHLORIDE 5 MG/1
2.5 TABLET ORAL
Status: DISCONTINUED | OUTPATIENT
Start: 2019-01-22 | End: 2019-01-23

## 2019-01-22 RX ORDER — HEPARIN SODIUM 5000 [USP'U]/ML
5000 INJECTION, SOLUTION INTRAVENOUS; SUBCUTANEOUS EVERY 8 HOURS
Status: DISCONTINUED | OUTPATIENT
Start: 2019-01-23 | End: 2019-01-30 | Stop reason: HOSPADM

## 2019-01-22 RX ORDER — LEVOTHYROXINE SODIUM 0.07 MG/1
75 TABLET ORAL
Status: DISCONTINUED | OUTPATIENT
Start: 2019-01-23 | End: 2019-01-30 | Stop reason: HOSPADM

## 2019-01-22 RX ORDER — DULOXETIN HYDROCHLORIDE 60 MG/1
60 CAPSULE, DELAYED RELEASE ORAL DAILY
Status: DISCONTINUED | OUTPATIENT
Start: 2019-01-23 | End: 2019-01-30 | Stop reason: HOSPADM

## 2019-01-22 RX ORDER — BUDESONIDE AND FORMOTEROL FUMARATE DIHYDRATE 160; 4.5 UG/1; UG/1
2 AEROSOL RESPIRATORY (INHALATION) 2 TIMES DAILY
Status: DISCONTINUED | OUTPATIENT
Start: 2019-01-23 | End: 2019-01-30 | Stop reason: HOSPADM

## 2019-01-22 RX ADMIN — LORAZEPAM 1 MG: 2 INJECTION INTRAMUSCULAR; INTRAVENOUS at 21:05

## 2019-01-22 RX ADMIN — DEXTROSE MONOHYDRATE 50 ML: 500 INJECTION PARENTERAL at 22:25

## 2019-01-22 RX ADMIN — ONDANSETRON 4 MG: 2 INJECTION INTRAMUSCULAR; INTRAVENOUS at 21:05

## 2019-01-22 RX ADMIN — INSULIN HUMAN 10 UNITS: 100 INJECTION, SOLUTION PARENTERAL at 22:25

## 2019-01-22 RX ADMIN — FUROSEMIDE 40 MG: 10 INJECTION, SOLUTION INTRAMUSCULAR; INTRAVENOUS at 22:42

## 2019-01-22 ASSESSMENT — ENCOUNTER SYMPTOMS
CARDIOVASCULAR NEGATIVE: 1
NEUROLOGICAL NEGATIVE: 1
FALLS: 1
SPUTUM PRODUCTION: 0
SHORTNESS OF BREATH: 1
CONSTITUTIONAL NEGATIVE: 1
BLURRED VISION: 1
GASTROINTESTINAL NEGATIVE: 1
COUGH: 0
PSYCHIATRIC NEGATIVE: 1

## 2019-01-22 ASSESSMENT — PAIN DESCRIPTION - DESCRIPTORS: DESCRIPTORS: ACHING

## 2019-01-23 LAB
ANION GAP SERPL CALC-SCNC: 5 MMOL/L (ref 0–11.9)
ANION GAP SERPL CALC-SCNC: 6 MMOL/L (ref 0–11.9)
ANION GAP SERPL CALC-SCNC: 9 MMOL/L (ref 0–11.9)
BASOPHILS # BLD AUTO: 0.3 % (ref 0–1.8)
BASOPHILS # BLD: 0.04 K/UL (ref 0–0.12)
BUN SERPL-MCNC: 54 MG/DL (ref 8–22)
BUN SERPL-MCNC: 55 MG/DL (ref 8–22)
BUN SERPL-MCNC: 56 MG/DL (ref 8–22)
CALCIUM SERPL-MCNC: 9.1 MG/DL (ref 8.5–10.5)
CALCIUM SERPL-MCNC: 9.5 MG/DL (ref 8.5–10.5)
CALCIUM SERPL-MCNC: 9.6 MG/DL (ref 8.5–10.5)
CHLORIDE SERPL-SCNC: 103 MMOL/L (ref 96–112)
CHLORIDE SERPL-SCNC: 97 MMOL/L (ref 96–112)
CHLORIDE SERPL-SCNC: 99 MMOL/L (ref 96–112)
CO2 SERPL-SCNC: 23 MMOL/L (ref 20–33)
CO2 SERPL-SCNC: 27 MMOL/L (ref 20–33)
CO2 SERPL-SCNC: 28 MMOL/L (ref 20–33)
CREAT SERPL-MCNC: 1.8 MG/DL (ref 0.5–1.4)
CREAT SERPL-MCNC: 1.83 MG/DL (ref 0.5–1.4)
CREAT SERPL-MCNC: 1.99 MG/DL (ref 0.5–1.4)
EOSINOPHIL # BLD AUTO: 0.14 K/UL (ref 0–0.51)
EOSINOPHIL NFR BLD: 1.1 % (ref 0–6.9)
ERYTHROCYTE [DISTWIDTH] IN BLOOD BY AUTOMATED COUNT: 46.8 FL (ref 35.9–50)
EST. AVERAGE GLUCOSE BLD GHB EST-MCNC: 126 MG/DL
GLUCOSE BLD-MCNC: 126 MG/DL (ref 65–99)
GLUCOSE BLD-MCNC: 138 MG/DL (ref 65–99)
GLUCOSE BLD-MCNC: 140 MG/DL (ref 65–99)
GLUCOSE BLD-MCNC: 174 MG/DL (ref 65–99)
GLUCOSE BLD-MCNC: 76 MG/DL (ref 65–99)
GLUCOSE SERPL-MCNC: 133 MG/DL (ref 65–99)
GLUCOSE SERPL-MCNC: 148 MG/DL (ref 65–99)
GLUCOSE SERPL-MCNC: 219 MG/DL (ref 65–99)
HBA1C MFR BLD: 6 % (ref 0–5.6)
HCT VFR BLD AUTO: 33.5 % (ref 37–47)
HGB BLD-MCNC: 10.8 G/DL (ref 12–16)
IMM GRANULOCYTES # BLD AUTO: 0.09 K/UL (ref 0–0.11)
IMM GRANULOCYTES NFR BLD AUTO: 0.7 % (ref 0–0.9)
LYMPHOCYTES # BLD AUTO: 2 K/UL (ref 1–4.8)
LYMPHOCYTES NFR BLD: 15.2 % (ref 22–41)
MCH RBC QN AUTO: 32.4 PG (ref 27–33)
MCHC RBC AUTO-ENTMCNC: 32.2 G/DL (ref 33.6–35)
MCV RBC AUTO: 100.6 FL (ref 81.4–97.8)
MONOCYTES # BLD AUTO: 1.17 K/UL (ref 0–0.85)
MONOCYTES NFR BLD AUTO: 8.9 % (ref 0–13.4)
NEUTROPHILS # BLD AUTO: 9.74 K/UL (ref 2–7.15)
NEUTROPHILS NFR BLD: 73.8 % (ref 44–72)
NRBC # BLD AUTO: 0 K/UL
NRBC BLD-RTO: 0 /100 WBC
PLATELET # BLD AUTO: 232 K/UL (ref 164–446)
PMV BLD AUTO: 9.4 FL (ref 9–12.9)
POTASSIUM SERPL-SCNC: 5.1 MMOL/L (ref 3.6–5.5)
POTASSIUM SERPL-SCNC: 5.6 MMOL/L (ref 3.6–5.5)
POTASSIUM SERPL-SCNC: 6.5 MMOL/L (ref 3.6–5.5)
POTASSIUM SERPL-SCNC: 6.8 MMOL/L (ref 3.6–5.5)
RBC # BLD AUTO: 3.33 M/UL (ref 4.2–5.4)
SODIUM SERPL-SCNC: 129 MMOL/L (ref 135–145)
SODIUM SERPL-SCNC: 133 MMOL/L (ref 135–145)
SODIUM SERPL-SCNC: 135 MMOL/L (ref 135–145)
WBC # BLD AUTO: 13.2 K/UL (ref 4.8–10.8)

## 2019-01-23 PROCEDURE — 85025 COMPLETE CBC W/AUTO DIFF WBC: CPT

## 2019-01-23 PROCEDURE — 700101 HCHG RX REV CODE 250: Performed by: INTERNAL MEDICINE

## 2019-01-23 PROCEDURE — 700111 HCHG RX REV CODE 636 W/ 250 OVERRIDE (IP): Performed by: INTERNAL MEDICINE

## 2019-01-23 PROCEDURE — 82962 GLUCOSE BLOOD TEST: CPT | Mod: 91

## 2019-01-23 PROCEDURE — 700111 HCHG RX REV CODE 636 W/ 250 OVERRIDE (IP): Performed by: HOSPITALIST

## 2019-01-23 PROCEDURE — 36415 COLL VENOUS BLD VENIPUNCTURE: CPT

## 2019-01-23 PROCEDURE — 302131 K PAD MOTOR: Performed by: INTERNAL MEDICINE

## 2019-01-23 PROCEDURE — 80048 BASIC METABOLIC PNL TOTAL CA: CPT | Mod: 91

## 2019-01-23 PROCEDURE — A9270 NON-COVERED ITEM OR SERVICE: HCPCS | Performed by: INTERNAL MEDICINE

## 2019-01-23 PROCEDURE — 302151 K-PAD 14X20: Performed by: INTERNAL MEDICINE

## 2019-01-23 PROCEDURE — 700102 HCHG RX REV CODE 250 W/ 637 OVERRIDE(OP): Performed by: INTERNAL MEDICINE

## 2019-01-23 PROCEDURE — 770020 HCHG ROOM/CARE - TELE (206)

## 2019-01-23 PROCEDURE — 700105 HCHG RX REV CODE 258: Performed by: INTERNAL MEDICINE

## 2019-01-23 PROCEDURE — 700102 HCHG RX REV CODE 250 W/ 637 OVERRIDE(OP): Performed by: HOSPITALIST

## 2019-01-23 PROCEDURE — A9270 NON-COVERED ITEM OR SERVICE: HCPCS | Performed by: HOSPITALIST

## 2019-01-23 PROCEDURE — 97162 PT EVAL MOD COMPLEX 30 MIN: CPT

## 2019-01-23 PROCEDURE — 99232 SBSQ HOSP IP/OBS MODERATE 35: CPT | Performed by: INTERNAL MEDICINE

## 2019-01-23 PROCEDURE — 700111 HCHG RX REV CODE 636 W/ 250 OVERRIDE (IP): Performed by: EMERGENCY MEDICINE

## 2019-01-23 RX ORDER — OXYCODONE HYDROCHLORIDE 5 MG/1
5 TABLET ORAL ONCE
Status: COMPLETED | OUTPATIENT
Start: 2019-01-23 | End: 2019-01-23

## 2019-01-23 RX ORDER — OXYCODONE HYDROCHLORIDE 5 MG/1
5 TABLET ORAL EVERY 4 HOURS PRN
Status: DISCONTINUED | OUTPATIENT
Start: 2019-01-23 | End: 2019-01-24

## 2019-01-23 RX ORDER — SODIUM POLYSTYRENE SULFONATE 15 G/60ML
15 SUSPENSION ORAL; RECTAL ONCE
Status: DISCONTINUED | OUTPATIENT
Start: 2019-01-23 | End: 2019-01-23

## 2019-01-23 RX ORDER — DEXTROSE MONOHYDRATE 25 G/50ML
25 INJECTION, SOLUTION INTRAVENOUS ONCE
Status: COMPLETED | OUTPATIENT
Start: 2019-01-23 | End: 2019-01-23

## 2019-01-23 RX ADMIN — INSULIN LISPRO 3 UNITS: 100 INJECTION, SOLUTION INTRAVENOUS; SUBCUTANEOUS at 07:06

## 2019-01-23 RX ADMIN — OXYCODONE HYDROCHLORIDE 5 MG: 5 TABLET ORAL at 14:22

## 2019-01-23 RX ADMIN — INSULIN LISPRO 3 UNITS: 100 INJECTION, SOLUTION INTRAVENOUS; SUBCUTANEOUS at 16:59

## 2019-01-23 RX ADMIN — CYCLOBENZAPRINE 10 MG: 10 TABLET, FILM COATED ORAL at 21:11

## 2019-01-23 RX ADMIN — Medication 2 TABLET: at 17:04

## 2019-01-23 RX ADMIN — DULOXETINE HYDROCHLORIDE 60 MG: 60 CAPSULE, DELAYED RELEASE ORAL at 06:47

## 2019-01-23 RX ADMIN — OXYCODONE HYDROCHLORIDE 5 MG: 5 TABLET ORAL at 06:48

## 2019-01-23 RX ADMIN — DEXTROSE MONOHYDRATE 50 ML: 500 INJECTION PARENTERAL at 10:09

## 2019-01-23 RX ADMIN — FUROSEMIDE 40 MG: 10 INJECTION, SOLUTION INTRAMUSCULAR; INTRAVENOUS at 06:48

## 2019-01-23 RX ADMIN — HYDROMORPHONE HYDROCHLORIDE 1 MG: 1 INJECTION, SOLUTION INTRAMUSCULAR; INTRAVENOUS; SUBCUTANEOUS at 00:08

## 2019-01-23 RX ADMIN — IPRATROPIUM BROMIDE 2 PUFF: 17 AEROSOL, METERED RESPIRATORY (INHALATION) at 12:08

## 2019-01-23 RX ADMIN — BUDESONIDE AND FORMOTEROL FUMARATE DIHYDRATE 2 PUFF: 160; 4.5 AEROSOL RESPIRATORY (INHALATION) at 06:47

## 2019-01-23 RX ADMIN — OXYCODONE HYDROCHLORIDE 5 MG: 5 TABLET ORAL at 17:05

## 2019-01-23 RX ADMIN — PATIROMER 8.4 G: 8.4 POWDER, FOR SUSPENSION ORAL at 07:12

## 2019-01-23 RX ADMIN — INSULIN HUMAN 10 UNITS: 100 INJECTION, SOLUTION PARENTERAL at 10:14

## 2019-01-23 RX ADMIN — ACETAMINOPHEN 650 MG: 325 TABLET, FILM COATED ORAL at 10:55

## 2019-01-23 RX ADMIN — OXYCODONE HYDROCHLORIDE 5 MG: 5 TABLET ORAL at 02:27

## 2019-01-23 RX ADMIN — LEVOTHYROXINE SODIUM 75 MCG: 75 TABLET ORAL at 06:48

## 2019-01-23 RX ADMIN — AMITRIPTYLINE HYDROCHLORIDE 50 MG: 50 TABLET, FILM COATED ORAL at 21:11

## 2019-01-23 RX ADMIN — INSULIN GLARGINE 10 UNITS: 100 INJECTION, SOLUTION SUBCUTANEOUS at 16:59

## 2019-01-23 RX ADMIN — Medication 2 TABLET: at 06:47

## 2019-01-23 RX ADMIN — NICOTINE 14 MG: 14 PATCH, EXTENDED RELEASE TRANSDERMAL at 06:46

## 2019-01-23 RX ADMIN — CALCIUM GLUCONATE 1000 MG: 98 INJECTION, SOLUTION INTRAVENOUS at 10:00

## 2019-01-23 RX ADMIN — BUDESONIDE AND FORMOTEROL FUMARATE DIHYDRATE 2 PUFF: 160; 4.5 AEROSOL RESPIRATORY (INHALATION) at 17:05

## 2019-01-23 RX ADMIN — ROSUVASTATIN CALCIUM 10 MG: 20 TABLET, FILM COATED ORAL at 17:04

## 2019-01-23 RX ADMIN — OXYCODONE HYDROCHLORIDE 5 MG: 5 TABLET ORAL at 10:55

## 2019-01-23 RX ADMIN — IPRATROPIUM BROMIDE 2 PUFF: 17 AEROSOL, METERED RESPIRATORY (INHALATION) at 17:05

## 2019-01-23 RX ADMIN — OXYCODONE HYDROCHLORIDE 5 MG: 5 TABLET ORAL at 21:11

## 2019-01-23 ASSESSMENT — ENCOUNTER SYMPTOMS
DEPRESSION: 0
INSOMNIA: 0
COUGH: 0
ABDOMINAL PAIN: 0
SPUTUM PRODUCTION: 0
BLURRED VISION: 0
EYE DISCHARGE: 0
HEARTBURN: 0
VOMITING: 0
WEIGHT LOSS: 0
NAUSEA: 0
CHILLS: 0
MYALGIAS: 0
EYE PAIN: 0
BACK PAIN: 0
HEADACHES: 0
NERVOUS/ANXIOUS: 0
DIZZINESS: 0
STRIDOR: 0
EYE REDNESS: 0
PALPITATIONS: 0
NECK PAIN: 0
DIARRHEA: 0
SEIZURES: 0
FOCAL WEAKNESS: 0
FEVER: 0
SHORTNESS OF BREATH: 0
ORTHOPNEA: 0

## 2019-01-23 ASSESSMENT — GAIT ASSESSMENTS
DISTANCE (FEET): 10
GAIT LEVEL OF ASSIST: MINIMAL ASSIST
ASSISTIVE DEVICE: FRONT WHEEL WALKER

## 2019-01-23 ASSESSMENT — COGNITIVE AND FUNCTIONAL STATUS - GENERAL
STANDING UP FROM CHAIR USING ARMS: A LITTLE
SUGGESTED CMS G CODE MODIFIER MOBILITY: CK
TURNING FROM BACK TO SIDE WHILE IN FLAT BAD: A LOT
SUGGESTED CMS G CODE MODIFIER MOBILITY: CL
CLIMB 3 TO 5 STEPS WITH RAILING: A LITTLE
SUGGESTED CMS G CODE MODIFIER DAILY ACTIVITY: CH
MOVING TO AND FROM BED TO CHAIR: A LITTLE
WALKING IN HOSPITAL ROOM: A LITTLE
WALKING IN HOSPITAL ROOM: A LITTLE
MOVING FROM LYING ON BACK TO SITTING ON SIDE OF FLAT BED: A LITTLE
MOVING TO AND FROM BED TO CHAIR: UNABLE
MOBILITY SCORE: 19
STANDING UP FROM CHAIR USING ARMS: A LITTLE
CLIMB 3 TO 5 STEPS WITH RAILING: A LOT
MOVING FROM LYING ON BACK TO SITTING ON SIDE OF FLAT BED: UNABLE
MOBILITY SCORE: 12
DAILY ACTIVITIY SCORE: 24

## 2019-01-23 ASSESSMENT — PAIN SCALES - GENERAL
PAINLEVEL_OUTOF10: 2
PAINLEVEL_OUTOF10: 7
PAINLEVEL_OUTOF10: 5

## 2019-01-23 ASSESSMENT — PATIENT HEALTH QUESTIONNAIRE - PHQ9
SUM OF ALL RESPONSES TO PHQ9 QUESTIONS 1 AND 2: 0
1. LITTLE INTEREST OR PLEASURE IN DOING THINGS: NOT AT ALL
2. FEELING DOWN, DEPRESSED, IRRITABLE, OR HOPELESS: NOT AT ALL

## 2019-01-23 ASSESSMENT — LIFESTYLE VARIABLES: EVER_SMOKED: YES

## 2019-01-23 NOTE — H&P
Hospital Medicine History & Physical Note    Date of Service  1/22/2019    Primary Care Physician  Kavitha Mccall M.D.    Consultants  None    Code Status  Full code     Chief Complaint  Right lower extremity pain    History of Presenting Illness  68 y.o. female with prior medical history of chronic obstructive pulmonary disease not on home oxygen therapy without current exacerbation, essential hypertension which is controlled, and hypothyroidism on replacement therapy was in her usual state of health until 6 days prior to admission.  She reports the onset of right lower extremity pain, sharp in nature, made worse with any attempts at movement, and somewhat relieved by lying on her left side.  She reports that the pain goes from her left hip down to her knee.  She initially presented to an urgent care, was given Flexeril, for which she had no relief, and subsequently presents to the emergency department after falling on the night of admission.  She reports that the fall occurred due to pain.  She reports associated blurred vision, she denies chest pain, she has some shortness of breath, abdominal pain, and nausea, without vomiting, denies diarrhea or constipation, fever or chills or other symptoms.  In the emergency department she was found to have critically high potassium level.    Review of Systems  Review of Systems   Constitutional: Negative.    HENT: Negative.    Eyes: Positive for blurred vision.   Respiratory: Positive for shortness of breath. Negative for cough and sputum production.    Cardiovascular: Negative.    Gastrointestinal: Negative.    Genitourinary: Negative.    Musculoskeletal: Positive for falls and joint pain.   Skin: Negative.    Neurological: Negative.    Endo/Heme/Allergies: Negative.    Psychiatric/Behavioral: Negative.        Past Medical History   has a past medical history of Arthritis; ASTHMA; Backpain; Breath shortness; Bronchitis; Congestive heart failure (HCC) (2013); COPD;  Diabetes; Disorder of thyroid; Fall; Fibromyalgia; GERD (gastroesophageal reflux disease); Heart burn; Hepatitis C; Hypertension; Indigestion; Infectious disease; Neuropathy (HCC); On home oxygen therapy; Other specified symptom associated with female genital organs; Pain (9/13/2016); PND (post-nasal drip); Pneumonia; Psychiatric problem (9/13/2016); RLS (restless legs syndrome); Sleep apnea; and Unspecified urinary incontinence.    Surgical History   has a past surgical history that includes gyn surgery; other orthopedic surgery; other orthopedic surgery; us-needle core bx-breast panel; lumpectomy (Left); pr inj dx/ther agnt paravert facet joint, bruce* (Left, 7/10/2015); pr inj dx/ther agnt paravert facet joint, bruce* (7/10/2015); pr inj dx/ther agnt paravert facet joint, bruce* (7/10/2015); pr inject nerv blck,othr periph nerv (7/10/2015); pr inj dx/ther agnt paravert facet joint, bruce* (Left, 7/17/2015); pr inj dx/ther agnt paravert facet joint, bruce* (7/17/2015); pr inj dx/ther agnt paravert facet joint, bruce* (7/17/2015); pr inject nerv blck,othr periph nerv (7/17/2015); pr dstr nrolytc agnt parverteb fct sngl lmbr/sacral (Left, 10/2/2015); pr dstr nrolytc agnt parverteb fct addl lmbr/sacral (10/2/2015); pr inject rx other periph nerve (10/2/2015); pr dstr nrolytc agnt parverteb fct addl lmbr/sacral (10/2/2015); pr dstr nrolytc agnt parverteb fct sngl lmbr/sacral (Right, 11/6/2015); pr dstr nrolytc agnt parverteb fct addl lmbr/sacral (11/6/2015); pr inject rx other periph nerve (11/6/2015); pr dstr nrolytc agnt parverteb fct addl lmbr/sacral (11/6/2015); pr dstr nrolytc agnt parverteb fct sngl lmbr/sacral (Left, 9/16/2016); pr dstr nrolytc agnt parverteb fct addl lmbr/sacral (9/16/2016); pr dstr nrolytc agnt parverteb fct addl lmbr/sacral (9/16/2016); pr fluoroscopic guidance needle placement (9/16/2016); and ankle orif (Left, 5/8/2017).     Family History  family history includes Alcohol/Drug in her mother; Cancer in her  "brother and father; Diabetes in her brother and maternal grandmother; Heart Disease in her mother and paternal grandmother; Lung Disease in her mother; Stroke in her paternal grandmother.     Social History   reports that she has been smoking Cigarettes.  She has a 50.00 pack-year smoking history. She has never used smokeless tobacco. She reports that she does not drink alcohol or use drugs.    Allergies  Allergies   Allergen Reactions   • Vitamin C Diarrhea     \"violent diarrhea\"   • Keflex Rash     Severe rash, but TOLERATES PENICILLINS, Rocephin    • Codeine Nausea     Severe stomach pain   • Gabapentin Palpitations     Gets shaky and falls    • Lyrica Nausea     Nausea, dizzy   • Sudafed Nausea and Unspecified     Chest pain, nausea       Medications  Prior to Admission Medications   Prescriptions Last Dose Informant Patient Reported? Taking?   DULoxetine (CYMBALTA) 60 MG Cap DR Particles delayed-release capsule   No No   Sig: TAKE 1 CAPSULE BY MOUTH DAILY   Diclofenac Sodium 1 % Gel   Yes No   Sig: Apply  to skin as directed.   LANTUS SOLOSTAR 100 UNIT/ML Solution Pen-injector injection   No No   Sig: INJECT 5-20 UNITS SUBCUTANEOUSLY EVERY EVENING   MethylPREDNISolone (MEDROL DOSEPAK) 4 MG Tablet Therapy Pack   No No   Sig: follow package directions   Nutritional Supplements (ANTIOXIDANTS PO)   Yes No   Sig: Take  by mouth.   SYMBICORT 160-4.5 MCG/ACT Aerosol   No No   Sig: INHALE 2 PUFFS BY MOUTH TWO TIMES DAILY   amitriptyline (ELAVIL) 50 MG Tab   No No   Sig: Take 1 Tab by mouth at bedtime as needed for Sleep.   benzonatate (TESSALON) 100 MG Cap   No No   Sig: Take 1 Cap by mouth 3 times a day as needed for Cough.   clobetasol (TEMOVATE) 0.05 % Cream   No No   Sig: Use a thin film to affected skin twice a day as needed.   clotrimazole-betamethasone (LOTRISONE) 1-0.05 % Cream   No No   Sig: Apply 1 g to affected area(s) 2 times a day.   cyclobenzaprine (FLEXERIL) 5 MG tablet   No No   Sig: Take 1-2 Tabs by " mouth 3 times a day as needed.   ipratropium (ATROVENT) 17 MCG/ACT Aero Soln   No No   Sig: Inhale 2 Puffs by mouth every 6 hours.   lactulose 10 GM/15ML Solution   No No   Sig: TAKE 30 MLS (6 TEASPOONFULS) BY MOUTH TWO TIMES DAILY   levothyroxine (SYNTHROID) 75 MCG Tab   No No   Sig: TAKE 1 TABLET BY MOUTH DAILY   lisinopril (PRINIVIL, ZESTRIL) 40 MG tablet   No No   Sig: TAKE 1 TABLET BY MOUTH DAILY   nystatin/triamcinolone (MYCOLOG) 470512-4.1 UNIT/GM-% Cream   No No   Sig: APPLY A THIN LAYER TO FUNGAL RASH ONCE DAILY AS NEEDED   oxyCODONE immediate release (ROXICODONE) 10 MG immediate release tablet   No No   Sig: Take 1 Tab by mouth every four hours as needed for Moderate Pain for up to 30 days.   rosuvastatin (CRESTOR) 10 MG Tab   No No   Sig: Take 1 Tab by mouth every evening.   varenicline (CHANTIX CONTINUING MONTH LUANN) 1 MG tablet   No No   Sig: Take 1 Tab by mouth 2 times a day.      Facility-Administered Medications: None       Physical Exam  Temp:  [36.2 °C (97.2 °F)] 36.2 °C (97.2 °F)  Pulse:  [54-67] 67  Resp:  [18] 18  BP: (162)/(39) 162/39  SpO2:  [94 %-96 %] 96 %    Physical Exam   Constitutional: She is oriented to person, place, and time. She appears well-developed and well-nourished. No distress.   HENT:   Head: Normocephalic and atraumatic.   Eyes: Pupils are equal, round, and reactive to light. Conjunctivae are normal.   Neck: Normal range of motion. Neck supple. No tracheal deviation present. No thyromegaly present.   Cardiovascular: Normal rate, regular rhythm and normal heart sounds.  Exam reveals no gallop and no friction rub.    No murmur heard.  Pulmonary/Chest: Effort normal and breath sounds normal. No respiratory distress. She has no wheezes. She has no rales.   Abdominal: Soft. Bowel sounds are normal. She exhibits no distension. There is no tenderness. There is no rebound.   Musculoskeletal: Normal range of motion. She exhibits no edema.   Lymphadenopathy:     She has no cervical  adenopathy.   Neurological: She is alert and oriented to person, place, and time. No cranial nerve deficit.   Skin: Skin is warm and dry. She is not diaphoretic.   Psychiatric: She has a normal mood and affect.   Nursing note and vitals reviewed.      Laboratory:  Recent Labs      01/22/19   2100   WBC  12.7*   RBC  3.37*   HEMOGLOBIN  10.9*   HEMATOCRIT  34.5*   MCV  102.4*   MCH  32.3   MCHC  31.6*   RDW  47.8   PLATELETCT  226   MPV  9.3     Recent Labs      01/22/19   2100   SODIUM  133*   POTASSIUM  7.1*   CHLORIDE  104   CO2  26   GLUCOSE  107*   BUN  55*   CREATININE  1.68*   CALCIUM  9.0     Recent Labs      01/22/19   2100   ALTSGPT  36   ASTSGOT  28   ALKPHOSPHAT  51   TBILIRUBIN  0.2   GLUCOSE  107*     Recent Labs      01/22/19   2100   APTT  31.1   INR  1.01     Recent Labs      01/22/19   2155   BNPBTYPENAT  41         No results for input(s): TROPONINI in the last 72 hours.    Urinalysis:    No results found     Imaging:  CT-LSPINE W/O PLUS RECONS   Final Result      1.  Moderate multilevel degenerative change of lumbar spine.   2.  No acute fracture or subluxation.   3.  Diffuse osteopenia.      CT-PELVIS W/O PLUS RECONS   Final Result      1.  No hip or pelvic fracture.   2.  Symmetric mild to moderate degenerative change of both hips.            Assessment/Plan:  I anticipate this patient will require at least two midnights for appropriate medical management, necessitating inpatient admission.    * Hyperkalemia   Assessment & Plan    Plan for lasix administration, strict I/O with sykes catheterization.  Monitor.       COPD (chronic obstructive pulmonary disease) ([facility])- (present on admission)   Assessment & Plan    Without current exacerbation, not on home oxygen therapy, continue current medical regimen.  Monitor.      Class 2 obesity due to excess calories without serious comorbidity with body mass index (BMI) of 36.0 to 36.9 in adult   Assessment & Plan    Body mass index is 36.61 kg/m².        Macrocytic anemia   Assessment & Plan    Without current evident bleed.  Monitor.  Transfuse if needed for hemoglobin less than 7 gm/dL       HLD (hyperlipidemia)- (present on admission)   Assessment & Plan    On statin therapy      Type 2 diabetes mellitus with hyperglycemia, with long-term current use of insulin (HCC)- (present on admission)   Assessment & Plan    Start basal and prandial insulin dosing, monitor.       Chronic pain syndrome- (present on admission)   Assessment & Plan    Now with acute component in the setting of right sided sciatic pain.  Will continue with flexeril, pain management as needed and PT evaluation.       Hypothyroidism- (present on admission)   Assessment & Plan    ON replacement therapy      Chronic kidney disease, stage 3 (HCC)- (present on admission)   Assessment & Plan    With probable acute component, and associated hyperkalemia.  Plan for lasix administration, monitor.       Essential hypertension- (present on admission)   Assessment & Plan    Controlled with current medication regimen, hold lisinopril in the setting of significant hyperkalemia          VTE prophylaxis: SCD, heparin

## 2019-01-23 NOTE — PROGRESS NOTES
2 RN skin check with Dorothea RN:    Behind patient's ears are non-red and blanching. Silicone nasal canula in use.  Patient's elbows are red, but blanching. Scattered bruising present on both arms, no open lesions.   Patient's sacrum is red, but blanching. Patient has scattered scabs/lesions on back, patient states she has dermatologist following.   Patient's heels are boggy, but blanching. Scattered scabs and bruising on legs, no open lesions.     No further need for skin breakdown interventions at this time.

## 2019-01-23 NOTE — CARE PLAN
Problem: Safety  Goal: Will remain free from injury  Outcome: PROGRESSING AS EXPECTED  Pt remains free from injury. Bed in lowest position, locked, and alarm activated. Nonskid footwear in place. Call light and personal belongings within reach. Hourly rounding.    Problem: Pain Management  Goal: Pain level will decrease to patient's comfort goal  Outcome: PROGRESSING AS EXPECTED   01/23/19 0300 01/23/19 0309   OTHER   Pain Scale 0 - 10  2 --    Comfort Goal --  Comfort at Rest   Discussed plan of care for pain management. Medication information per MAR, and non-pharmacological interventions: rest/repositioning. Pt verbalizes agreement and understanding.

## 2019-01-23 NOTE — PROGRESS NOTES
Pt arrived on unit via RN escort. Ambulated with walker to bed. Tele monitor applied and monitor room notified. SR 70 on monitor. Plan of care discussed with pt. Verbalizes understanding. Bed in lowest position, locked, and alarm activated. Call light within reach.

## 2019-01-23 NOTE — ED NOTES
Lab called with critical result of K 6.8 at 0035. Critical lab result read back to lab.   Dr. Herr notified of critical lab result at 0037.  Critical lab result read back by Dr. Herr.

## 2019-01-23 NOTE — ASSESSMENT & PLAN NOTE
Complaint about right sciatic pain  CT lumbar no acute finding  Continue home oxycodone 10 mg q4-6 hrs as needed flexaril  PT/OT: Recommending home health care with PT OT.

## 2019-01-23 NOTE — ED NOTES
Lab called with critical result of K 7.1 at 2148. Critical lab result read back to lab.   Dr. Lentz notified of critical lab result at 2149.  Critical lab result read back by Dr. Lentz  .

## 2019-01-23 NOTE — ED TRIAGE NOTES
"Patient arrives via EMS from home s/p mechanical fall. Denies head strike, states she is on a blood thinner \"I don't know which one and I don't know why.\" Pain to right leg. States \"I have sciatica and my leg just gave out.\" Uses 3L home O2 for COPD. AOx4, calm and cooperative. + pedal pulses, no obvious deformities noted.   "

## 2019-01-23 NOTE — PROGRESS NOTES
Hospital Medicine Daily Progress Note    Date of Service  1/23/2019    Chief Complaint  68 y.o. female admitted 1/22/2019 with RLE pain    Hospital Course    67 y/o F with PMH of COPD, HTN, hypothyroid came in with above and found to have hyperkalemia with K 7.3.      Interval Problem Update  Still having right sciatic pain in her leg, also complaint about occasional right leg weakness. Denies urinary or bowel incontinence. CT lumbar spine showed diffuse degenerative changes.  PT/OT    Consultants/Specialty  none    Code Status  full    Disposition  Remain on the floor    Review of Systems  Review of Systems   Constitutional: Negative for chills, fever and weight loss.   HENT: Negative for congestion and nosebleeds.    Eyes: Negative for blurred vision, pain, discharge and redness.   Respiratory: Negative for cough, sputum production, shortness of breath and stridor.    Cardiovascular: Negative for chest pain, palpitations and orthopnea.   Gastrointestinal: Negative for abdominal pain, diarrhea, heartburn, nausea and vomiting.   Genitourinary: Negative for dysuria, frequency and urgency.   Musculoskeletal: Positive for joint pain. Negative for back pain, myalgias and neck pain.   Skin: Negative for itching and rash.   Neurological: Negative for dizziness, focal weakness, seizures and headaches.   Psychiatric/Behavioral: Negative for depression. The patient is not nervous/anxious and does not have insomnia.         Physical Exam  Temp:  [36.2 °C (97.2 °F)-36.6 °C (97.9 °F)] 36.4 °C (97.6 °F)  Pulse:  [54-67] 63  Resp:  [18-20] 20  BP: (130-162)/(39-65) 130/65  SpO2:  [94 %-96 %] 94 %    Physical Exam   Constitutional: She is oriented to person, place, and time. No distress.   HENT:   Head: Normocephalic and atraumatic.   Mouth/Throat: Oropharynx is clear and moist.   Eyes: Pupils are equal, round, and reactive to light. Conjunctivae and EOM are normal.   Neck: Normal range of motion. Neck supple. No tracheal  deviation present. No thyromegaly present.   Cardiovascular: Normal rate and regular rhythm.    No murmur heard.  Pulmonary/Chest: Effort normal and breath sounds normal. No respiratory distress. She has no wheezes.   Abdominal: Soft. Bowel sounds are normal. She exhibits no distension. There is no tenderness.   Musculoskeletal: She exhibits tenderness. She exhibits no edema.   Right sciatic pain   Neurological: She is alert and oriented to person, place, and time. No cranial nerve deficit.   Skin: Skin is warm and dry. She is not diaphoretic. No erythema.   Psychiatric: She has a normal mood and affect. Her behavior is normal. Thought content normal.       Fluids    Intake/Output Summary (Last 24 hours) at 01/23/19 0741  Last data filed at 01/23/19 0500   Gross per 24 hour   Intake              120 ml   Output             1800 ml   Net            -1680 ml       Laboratory  Recent Labs      01/22/19 2100 01/23/19   0327   WBC  12.7*  13.2*   RBC  3.37*  3.33*   HEMOGLOBIN  10.9*  10.8*   HEMATOCRIT  34.5*  33.5*   MCV  102.4*  100.6*   MCH  32.3  32.4   MCHC  31.6*  32.2*   RDW  47.8  46.8   PLATELETCT  226  232   MPV  9.3  9.4     Recent Labs      01/22/19   2100  01/22/19   2342  01/23/19   0327   SODIUM  133*   --   135   POTASSIUM  7.1*  6.8*  6.5*   CHLORIDE  104   --   103   CO2  26   --   23   GLUCOSE  107*   --   133*   BUN  55*   --   54*   CREATININE  1.68*   --   1.83*   CALCIUM  9.0   --   9.5     Recent Labs      01/22/19   2100   APTT  31.1   INR  1.01     Recent Labs      01/22/19   2155   BNPBTYPENAT  41           Imaging  CT-LSPINE W/O PLUS RECONS   Final Result      1.  Moderate multilevel degenerative change of lumbar spine.   2.  No acute fracture or subluxation.   3.  Diffuse osteopenia.      CT-PELVIS W/O PLUS RECONS   Final Result      1.  No hip or pelvic fracture.   2.  Symmetric mild to moderate degenerative change of both hips.           Assessment/Plan  * Hyperkalemia- (present on  admission)   Assessment & Plan    Likely related to lisinopril  Holding lisinopril  Monitor on tele  Hyperkalemic protocol with calcium gluconate, insulin/dextrose  K 6.3 this am  Follow bmp closely     COPD (chronic obstructive pulmonary disease) (HCC)- (present on admission)   Assessment & Plan    Without current exacerbation, not on home oxygen therapy, continue current medical regimen.  Monitor.      Class 2 obesity due to excess calories without serious comorbidity with body mass index (BMI) of 36.0 to 36.9 in adult   Assessment & Plan    Body mass index is 36.61 kg/m².       Macrocytic anemia   Assessment & Plan    stable       HLD (hyperlipidemia)- (present on admission)   Assessment & Plan    On statin therapy      Type 2 diabetes mellitus with hyperglycemia, with long-term current use of insulin (HCC)- (present on admission)   Assessment & Plan    Start basal and prandial insulin dosing, monitor.       Chronic pain syndrome- (present on admission)   Assessment & Plan    Complaint about right sciatic pain  CT lumbar no acute finding  Will try to give lowest narcotics as possible and will transition to non narcotics by tomorrow.      Hypothyroidism- (present on admission)   Assessment & Plan    ON replacement therapy      Chronic kidney disease, stage 3 (HCC)- (present on admission)   Assessment & Plan    Stage III/IV  Cr stable at baseline  Follow cmp in am     Essential hypertension- (present on admission)   Assessment & Plan    Controlled with current medication regimen, hold lisinopril in the setting of significant hyperkalemia           VTE prophylaxis: heparin

## 2019-01-23 NOTE — ASSESSMENT & PLAN NOTE
Likely related to chronic kidney disease and lisinopril, discussed with nephrology and recommended to continue on veltessa, I have sent prescription to patient's pharmacy and discussed with  to check for insurance coverage, if insurance does not cover copayment is too high nephrology is recommending to start Lasix 10 mg twice daily.

## 2019-01-23 NOTE — PROGRESS NOTES
Received report from NOC RN and assumed care of pt at 0700. Pt AOx4, c/o 4/10 leg pain, declines additional pain meds at this time. Plan of care discussed with patient. Bed in low and locked position. Call light and personal belongings in reach.

## 2019-01-23 NOTE — ED PROVIDER NOTES
"ED Provider Note    Scribed for Brandon Lentz M.D. by Dora Cuello. 1/22/2019  8:38 PM    Primary care provider: Kavitha Mccall M.D.  Means of arrival: EMS   History obtained from: patient   History limited by: none     CHIEF COMPLAINT  Chief Complaint   Patient presents with   • Leg Pain     Right leg pain s/p GLF       HPI  Priya Callahan is a 68 y.o. female with a history of arthritis, asthma, CHF, COPD, chronic back pain, diabetes, fibromyalgia, GERD, hypertension, neuropathy, and sleep apnea who presents to the Emergency Department with right leg pain secondary to a mechanical ground level fall this evening at 6:30 PM. Patient reports she suddenly began to feel associated nausea and abdominal pain this evening and got up to use the restroom. While on her way to the restroom, \"her right leg suddenly gave out\" causing her to fall onto the ground onto her sacral area, likely exacerbating her chronic back pain. Patient states she has recently been seen at urgent care for right sided sciatica pain with right leg weakness and was placed on a steroid/ given Flexeril.  She does take Oxycodone daily for relief of her chronic back pain. Patient does not have a pain management physician and her primary care provider prescribes her narcotics.  No fevers.     REVIEW OF SYSTEMS  Pertinent positives include right leg pain, nausea, abdominal pain, right leg weakness, back pain (chronic). Pertinent negatives include no fevers.  All other systems reviewed and negative.    PAST MEDICAL HISTORY   has a past medical history of Arthritis; ASTHMA; Backpain; Breath shortness; Bronchitis; Congestive heart failure (HCC) (2013); COPD; Diabetes; Disorder of thyroid; Fall; Fibromyalgia; GERD (gastroesophageal reflux disease); Heart burn; Hepatitis C; Hypertension; Indigestion; Infectious disease; Neuropathy (HCC); On home oxygen therapy; Other specified symptom associated with female genital organs; Pain (9/13/2016); PND " (post-nasal drip); Pneumonia; Psychiatric problem (9/13/2016); RLS (restless legs syndrome); Sleep apnea; and Unspecified urinary incontinence.    SURGICAL HISTORY   has a past surgical history that includes gyn surgery; other orthopedic surgery; other orthopedic surgery; us-needle core bx-breast panel; lumpectomy (Left); inj dx/ther agnt paravert facet joint, bruce* (Left, 7/10/2015); inj dx/ther agnt paravert facet joint, bruce* (7/10/2015); inj dx/ther agnt paravert facet joint, bruce* (7/10/2015); inject nerv blck,othr periph nerv (7/10/2015); inj dx/ther agnt paravert facet joint, bruce* (Left, 7/17/2015); inj dx/ther agnt paravert facet joint, bruce* (7/17/2015); inj dx/ther agnt paravert facet joint, bruce* (7/17/2015); inject nerv blck,othr periph nerv (7/17/2015); dstr nrolytc agnt parverteb fct sngl lmbr/sacral (Left, 10/2/2015); dstr nrolytc agnt parverteb fct addl lmbr/sacral (10/2/2015); inject rx other periph nerve (10/2/2015); dstr nrolytc agnt parverteb fct addl lmbr/sacral (10/2/2015); dstr nrolytc agnt parverteb fct sngl lmbr/sacral (Right, 11/6/2015); dstr nrolytc agnt parverteb fct addl lmbr/sacral (11/6/2015); inject rx other periph nerve (11/6/2015); dstr nrolytc agnt parverteb fct addl lmbr/sacral (11/6/2015); dstr nrolytc agnt parverteb fct sngl lmbr/sacral (Left, 9/16/2016); dstr nrolytc agnt parverteb fct addl lmbr/sacral (9/16/2016); dstr nrolytc agnt parverteb fct addl lmbr/sacral (9/16/2016); fluoroscopic guidance needle placement (9/16/2016); and ankle orif (Left, 5/8/2017).    SOCIAL HISTORY  Social History   Substance Use Topics   • Smoking status: Current Every Day Smoker     Packs/day: 1.00     Years: 50.00     Types: Cigarettes     Last attempt to quit: 3/12/2018   • Smokeless tobacco: Never Used   • Alcohol use No      History   Drug Use No       FAMILY HISTORY  Family History   Problem Relation Age of Onset   • Lung Disease Mother    • Alcohol/Drug Mother    • Heart Disease Mother    • Cancer  "Father    • Cancer Brother    • Diabetes Brother    • Diabetes Maternal Grandmother    • Stroke Paternal Grandmother    • Heart Disease Paternal Grandmother        CURRENT MEDICATIONS  Current medications can be reviewed in the nurse's note.     ALLERGIES  Allergies   Allergen Reactions   • Vitamin C Diarrhea     \"violent diarrhea\"   • Keflex Rash     Severe rash, but TOLERATES PENICILLINS, Rocephin    • Codeine Nausea     Severe stomach pain   • Gabapentin Palpitations     Gets shaky and falls    • Lyrica Nausea     Nausea, dizzy   • Sudafed Nausea and Unspecified     Chest pain, nausea       PHYSICAL EXAM  VITAL SIGNS: BP (!) 162/39   Pulse 60   Temp 36.2 °C (97.2 °F) (Temporal)   Resp 18   Ht 1.651 m (5' 5\")   Wt 99.8 kg (220 lb)   SpO2 96%   BMI 36.61 kg/m²     Constitutional: Moderate distress, Non-toxic appearance. Patient is morbidly obese.   HENT: Normocephalic, Atraumatic, Bilateral external ears normal, Oropharynx moist, No oral exudates.   Eyes: PERRLA, EOMI, Conjunctiva normal, No discharge.   Neck: No tenderness, Supple, No stridor.   Lymphatic: No lymphadenopathy noted.   Cardiovascular: Normal heart rate, Normal rhythm.   Thorax & Lungs: Clear to auscultation bilaterally, No respiratory distress, No wheezing, No crackles.   Abdomen: Soft, No tenderness, No masses, No pulsatile masses.  Back: Tenderness to palpation throughout the right gluteal and sacral area. Good muscle strength distally.   Skin: Warm, Dry, No erythema, No rash.   Extremities:, No edema. No cyanosis.   Musculoskeletal: Tenderness to palpation throughout the right gluteal and sacral area. Good muscle strength distally. No major deformities noted.  Intact distal pulses.  Neurologic: Awake, alert. Moves all extremities spontaneously.  Psychiatric: Anxious hyperventilating, tearful.     LABS  Labs Reviewed   CBC WITH DIFFERENTIAL - Abnormal; Notable for the following:        Result Value    WBC 12.7 (*)     RBC 3.37 (*)     " Hemoglobin 10.9 (*)     Hematocrit 34.5 (*)     .4 (*)     MCHC 31.6 (*)     Neutrophils-Polys 81.80 (*)     Lymphocytes 8.30 (*)     Neutrophils (Absolute) 10.36 (*)     Monos (Absolute) 0.97 (*)     All other components within normal limits    Narrative:     Indicate which anticoagulants the patient is on:->UNKNOWN   COMP METABOLIC PANEL - Abnormal; Notable for the following:     Sodium 133 (*)     Potassium 7.1 (*)     Glucose 107 (*)     Bun 55 (*)     Creatinine 1.68 (*)     All other components within normal limits    Narrative:     Indicate which anticoagulants the patient is on:->UNKNOWN   ESTIMATED GFR - Abnormal; Notable for the following:     GFR If  37 (*)     GFR If Non  30 (*)     All other components within normal limits    Narrative:     Indicate which anticoagulants the patient is on:->UNKNOWN   PROTHROMBIN TIME    Narrative:     Indicate which anticoagulants the patient is on:->UNKNOWN   APTT    Narrative:     Indicate which anticoagulants the patient is on:->UNKNOWN   CREATINE KINASE   BTYPE NATRIURETIC PEPTIDE     All labs reviewed by me.    EKG    Results for orders placed or performed during the hospital encounter of 19   EKG   Result Value Ref Range    Report       [facility] Emergency Dept.    Test Date:  2019  Pt Name:    CHAITANYA CABELLO            Department: ER  MRN:        [id number]                      Room:       [facility]  Gender:     Female                       Technician: [id number]  :        [id number]                   Requested By:SALEEM PIMENTEL  Order #:    [id number]                    Reading MD: SLAEEM PIMENTEL MD    Measurements  Intervals                                Axis  Rate:       55                           P:          61  NH:         192                          QRS:        -11  QRSD:       98                           T:          83  QT:         432  QTc:        414    Interpretive  Statements  SINUS BRADYCARDIA  TALL T WAVES, CONSIDER HYPERKALEMIA  BASELINE WANDER IN LEAD(S) II,III,aVR,aVL,aVF  Compared to ECG 11/12/2017 06:12:32  Sinus rhythm no longer present  T-wave abnormality still present    Electronically Signed On 1- 22:38:41 PST by SALEEM PIMENTEL MD         RADIOLOGY  CT-LSPINE W/O PLUS RECONS   Final Result      1.  Moderate multilevel degenerative change of lumbar spine.   2.  No acute fracture or subluxation.   3.  Diffuse osteopenia.      CT-PELVIS W/O PLUS RECONS   Final Result      1.  No hip or pelvic fracture.   2.  Symmetric mild to moderate degenerative change of both hips.        The radiologist's interpretation of all radiological studies have been reviewed by me.    COURSE & MEDICAL DECISION MAKING  Pertinent Labs & Imaging studies reviewed. (See chart for details)    I reviewed the patient's medical records which showed the patient has a history of hypertension, diabetes, CHF, and chronic pain syndrome.     8:38 PM - Patient seen and examined at bedside. Patient will be treated with Ativan 1 mg, Zofran 4 mg, and Dilaudid 1-2 mg. Ordered CT Lspine, CT pelvis, CBC, CMP, PTT, APTT to evaluate her symptoms. The differential diagnoses include but are not limited to: acute on chronic back pain, lumbar radiculopathy, chronic pain.     10:10 PM- Reviewed the patient's lab and imaging results which are shown above. Patient was found to be hyperkalemic with a potassium level of 7.1. Her glucose is 107, BUN is 55, and creatinine is 1.68. Patient will be treated with dextrose 50% 50mL, Humulin 10 units for treatment, and Lasix 40 mg.     10:48 PM- Paged the hospitalist for admission.     1100- Spoke with Dr. Herr, hospitalist, concerning patient case. Agreed to admit patient for further treatment and evaluation.     CRITICAL CARE  The very real possibilty of a deterioration of this patient's condition required the highest level of my preparedness for sudden, emergent  intervention.  I provided critical care services, which included medication orders, frequent reevaluations of the patient's condition and response to treatment, ordering and reviewing test results, and discussing the case with various consultants.  The critical care time associated with the care of the patient was 35 minutes. Review chart for interventions. This time is exclusive of any other billable procedures.     Decision Making:  Patient comes in secondary to acute onset low back pain with sciatica type symptoms, has been on steroids.  The patient does have some chronic kidney disease.  Laboratory tests were checked, give the patient multiple doses of pain medicines and anxiety lytics, the patient's potassium came back elevated to 7.1, this was confirmed by EKG, give the patient insulin, D50, Lasix.  Discussed the case with the hospitalist for admission the hospital.    DISPOSITION:  Patient will be admitted to Dr. Herr, hospitalist, in critical condition.    FINAL IMPRESSION  1. Hyperkalemia    2. Acute right-sided low back pain with right-sided sciatica    Critical care time of 35 minutes.      I, Dora Cuello (Alla), am scribing for, and in the presence of, Brandon Lentz M.D..    Electronically signed by: Dora Cuello (Alla), 1/22/2019    IBrandon M.D. personally performed the services described in this documentation, as scribed by Dora Cuello in my presence, and it is both accurate and complete. C.     The note accurately reflects work and decisions made by me.  Brandon Lentz  1/22/2019  11:37 PM

## 2019-01-24 LAB
GLUCOSE BLD-MCNC: 137 MG/DL (ref 65–99)
GLUCOSE BLD-MCNC: 176 MG/DL (ref 65–99)
GLUCOSE BLD-MCNC: 89 MG/DL (ref 65–99)
GLUCOSE BLD-MCNC: 90 MG/DL (ref 65–99)

## 2019-01-24 PROCEDURE — 700102 HCHG RX REV CODE 250 W/ 637 OVERRIDE(OP): Performed by: HOSPITALIST

## 2019-01-24 PROCEDURE — 82962 GLUCOSE BLOOD TEST: CPT | Mod: 91

## 2019-01-24 PROCEDURE — 97165 OT EVAL LOW COMPLEX 30 MIN: CPT

## 2019-01-24 PROCEDURE — 700102 HCHG RX REV CODE 250 W/ 637 OVERRIDE(OP): Performed by: INTERNAL MEDICINE

## 2019-01-24 PROCEDURE — 770020 HCHG ROOM/CARE - TELE (206)

## 2019-01-24 PROCEDURE — 97530 THERAPEUTIC ACTIVITIES: CPT

## 2019-01-24 PROCEDURE — 700111 HCHG RX REV CODE 636 W/ 250 OVERRIDE (IP): Performed by: HOSPITALIST

## 2019-01-24 PROCEDURE — 700105 HCHG RX REV CODE 258: Performed by: INTERNAL MEDICINE

## 2019-01-24 PROCEDURE — A9270 NON-COVERED ITEM OR SERVICE: HCPCS | Performed by: INTERNAL MEDICINE

## 2019-01-24 PROCEDURE — 700111 HCHG RX REV CODE 636 W/ 250 OVERRIDE (IP): Performed by: INTERNAL MEDICINE

## 2019-01-24 PROCEDURE — 97535 SELF CARE MNGMENT TRAINING: CPT

## 2019-01-24 PROCEDURE — A9270 NON-COVERED ITEM OR SERVICE: HCPCS | Performed by: HOSPITALIST

## 2019-01-24 PROCEDURE — 99232 SBSQ HOSP IP/OBS MODERATE 35: CPT | Performed by: INTERNAL MEDICINE

## 2019-01-24 RX ORDER — AMLODIPINE BESYLATE 10 MG/1
10 TABLET ORAL
Status: DISCONTINUED | OUTPATIENT
Start: 2019-01-24 | End: 2019-01-30 | Stop reason: HOSPADM

## 2019-01-24 RX ORDER — MORPHINE SULFATE 4 MG/ML
4 INJECTION, SOLUTION INTRAMUSCULAR; INTRAVENOUS ONCE
Status: COMPLETED | OUTPATIENT
Start: 2019-01-24 | End: 2019-01-24

## 2019-01-24 RX ORDER — CYCLOBENZAPRINE HCL 10 MG
5 TABLET ORAL 3 TIMES DAILY PRN
Status: DISCONTINUED | OUTPATIENT
Start: 2019-01-24 | End: 2019-01-30 | Stop reason: HOSPADM

## 2019-01-24 RX ORDER — CALCIUM GLUCONATE 94 MG/ML
1 INJECTION, SOLUTION INTRAVENOUS ONCE
Status: DISCONTINUED | OUTPATIENT
Start: 2019-01-24 | End: 2019-01-24

## 2019-01-24 RX ORDER — OXYCODONE HYDROCHLORIDE 5 MG/1
2.5 TABLET ORAL EVERY 4 HOURS PRN
Status: DISCONTINUED | OUTPATIENT
Start: 2019-01-24 | End: 2019-01-25

## 2019-01-24 RX ADMIN — INSULIN GLARGINE 10 UNITS: 100 INJECTION, SOLUTION SUBCUTANEOUS at 21:18

## 2019-01-24 RX ADMIN — IPRATROPIUM BROMIDE 2 PUFF: 17 AEROSOL, METERED RESPIRATORY (INHALATION) at 11:30

## 2019-01-24 RX ADMIN — Medication 2 TABLET: at 16:43

## 2019-01-24 RX ADMIN — CYCLOBENZAPRINE 5 MG: 10 TABLET, FILM COATED ORAL at 21:16

## 2019-01-24 RX ADMIN — INSULIN LISPRO 3 UNITS: 100 INJECTION, SOLUTION INTRAVENOUS; SUBCUTANEOUS at 11:35

## 2019-01-24 RX ADMIN — DULOXETINE HYDROCHLORIDE 60 MG: 60 CAPSULE, DELAYED RELEASE ORAL at 06:10

## 2019-01-24 RX ADMIN — IPRATROPIUM BROMIDE 2 PUFF: 17 AEROSOL, METERED RESPIRATORY (INHALATION) at 01:22

## 2019-01-24 RX ADMIN — CALCIUM GLUCONATE 1 G: 98 INJECTION, SOLUTION INTRAVENOUS at 13:44

## 2019-01-24 RX ADMIN — MORPHINE SULFATE 4 MG: 4 INJECTION INTRAVENOUS at 23:18

## 2019-01-24 RX ADMIN — BUDESONIDE AND FORMOTEROL FUMARATE DIHYDRATE 2 PUFF: 160; 4.5 AEROSOL RESPIRATORY (INHALATION) at 06:10

## 2019-01-24 RX ADMIN — ROSUVASTATIN CALCIUM 10 MG: 20 TABLET, FILM COATED ORAL at 16:43

## 2019-01-24 RX ADMIN — OXYCODONE HYDROCHLORIDE 5 MG: 5 TABLET ORAL at 10:53

## 2019-01-24 RX ADMIN — NICOTINE 14 MG: 14 PATCH, EXTENDED RELEASE TRANSDERMAL at 06:11

## 2019-01-24 RX ADMIN — BUDESONIDE AND FORMOTEROL FUMARATE DIHYDRATE 2 PUFF: 160; 4.5 AEROSOL RESPIRATORY (INHALATION) at 16:45

## 2019-01-24 RX ADMIN — OXYCODONE HYDROCHLORIDE 5 MG: 5 TABLET ORAL at 06:11

## 2019-01-24 RX ADMIN — CYCLOBENZAPRINE 5 MG: 10 TABLET, FILM COATED ORAL at 16:43

## 2019-01-24 RX ADMIN — INSULIN LISPRO 1 UNITS: 100 INJECTION, SOLUTION INTRAVENOUS; SUBCUTANEOUS at 21:19

## 2019-01-24 RX ADMIN — OXYCODONE HYDROCHLORIDE 2.5 MG: 5 TABLET ORAL at 16:43

## 2019-01-24 RX ADMIN — CYCLOBENZAPRINE 5 MG: 10 TABLET, FILM COATED ORAL at 13:44

## 2019-01-24 RX ADMIN — OXYCODONE HYDROCHLORIDE 2.5 MG: 5 TABLET ORAL at 20:27

## 2019-01-24 RX ADMIN — OXYCODONE HYDROCHLORIDE 5 MG: 5 TABLET ORAL at 01:22

## 2019-01-24 RX ADMIN — AMLODIPINE BESYLATE 10 MG: 10 TABLET ORAL at 09:23

## 2019-01-24 RX ADMIN — ACETAMINOPHEN 650 MG: 325 TABLET, FILM COATED ORAL at 13:51

## 2019-01-24 RX ADMIN — BISACODYL 10 MG: 10 SUPPOSITORY RECTAL at 20:27

## 2019-01-24 RX ADMIN — IPRATROPIUM BROMIDE 2 PUFF: 17 AEROSOL, METERED RESPIRATORY (INHALATION) at 06:12

## 2019-01-24 RX ADMIN — LEVOTHYROXINE SODIUM 75 MCG: 75 TABLET ORAL at 06:12

## 2019-01-24 RX ADMIN — PATIROMER 8.4 G: 8.4 POWDER, FOR SUSPENSION ORAL at 06:31

## 2019-01-24 RX ADMIN — POLYETHYLENE GLYCOL 3350 1 PACKET: 17 POWDER, FOR SOLUTION ORAL at 16:42

## 2019-01-24 RX ADMIN — Medication 2 TABLET: at 06:11

## 2019-01-24 RX ADMIN — IPRATROPIUM BROMIDE 2 PUFF: 17 AEROSOL, METERED RESPIRATORY (INHALATION) at 16:45

## 2019-01-24 ASSESSMENT — COGNITIVE AND FUNCTIONAL STATUS - GENERAL
WALKING IN HOSPITAL ROOM: A LITTLE
SUGGESTED CMS G CODE MODIFIER DAILY ACTIVITY: CJ
SUGGESTED CMS G CODE MODIFIER MOBILITY: CK
STANDING UP FROM CHAIR USING ARMS: A LITTLE
MOVING TO AND FROM BED TO CHAIR: A LITTLE
TURNING FROM BACK TO SIDE WHILE IN FLAT BAD: A LITTLE
DAILY ACTIVITIY SCORE: 22
MOVING FROM LYING ON BACK TO SITTING ON SIDE OF FLAT BED: A LITTLE
CLIMB 3 TO 5 STEPS WITH RAILING: A LITTLE
DRESSING REGULAR LOWER BODY CLOTHING: A LITTLE
HELP NEEDED FOR BATHING: A LITTLE
MOBILITY SCORE: 18

## 2019-01-24 ASSESSMENT — ENCOUNTER SYMPTOMS
NERVOUS/ANXIOUS: 0
SPUTUM PRODUCTION: 0
ABDOMINAL PAIN: 0
HEARTBURN: 0
DIZZINESS: 0
COUGH: 0
SHORTNESS OF BREATH: 0
MYALGIAS: 0
BLURRED VISION: 0
NECK PAIN: 0
WEIGHT LOSS: 0
DIARRHEA: 0
STRIDOR: 0
SEIZURES: 0
EYE REDNESS: 0
VOMITING: 0
HEADACHES: 0
CHILLS: 0
FOCAL WEAKNESS: 0
ORTHOPNEA: 0
FEVER: 0
PALPITATIONS: 0
BACK PAIN: 0
EYE DISCHARGE: 0
EYE PAIN: 0
INSOMNIA: 0
NAUSEA: 0
DEPRESSION: 0

## 2019-01-24 ASSESSMENT — PATIENT HEALTH QUESTIONNAIRE - PHQ9
SUM OF ALL RESPONSES TO PHQ9 QUESTIONS 1 AND 2: 0
2. FEELING DOWN, DEPRESSED, IRRITABLE, OR HOPELESS: NOT AT ALL
1. LITTLE INTEREST OR PLEASURE IN DOING THINGS: NOT AT ALL

## 2019-01-24 ASSESSMENT — GAIT ASSESSMENTS
ASSISTIVE DEVICE: FRONT WHEEL WALKER
GAIT LEVEL OF ASSIST: CONTACT GUARD ASSIST

## 2019-01-24 ASSESSMENT — ACTIVITIES OF DAILY LIVING (ADL): TOILETING: INDEPENDENT

## 2019-01-24 NOTE — DISCHARGE PLANNING
Anticipated Discharge Disposition: D/C to SNF    Action: LIZETT confirmed pt has a PASRR on file ([id number]).    Barriers to Discharge: N/A    Plan: N/A

## 2019-01-24 NOTE — RESPIRATORY CARE
COPD EDUCATION by COPD CLINICAL EDUCATOR  1/24/2019 at 7:18 AM by Cristela Martinez     Patient reviewed by COPD education team. Patient does not qualify for COPD program.

## 2019-01-24 NOTE — PROGRESS NOTES
Hospital Medicine Daily Progress Note    Date of Service  1/24/2019    Chief Complaint  68 y.o. female admitted 1/22/2019 with RLE pain    Hospital Course    69 y/o F with PMH of COPD, HTN, hypothyroid came in with above and found to have hyperkalemia with K 7.3.      Interval Problem Update  Still having right sciatic pain in her leg, but a little better compare to yeseterday. Updated her about her potassium level this morning and changed her BP meds to amlodipine.   Consultants/Specialty  none    Code Status  full    Disposition  Remain on the floor    Review of Systems  Review of Systems   Constitutional: Negative for chills, fever and weight loss.   HENT: Negative for congestion and nosebleeds.    Eyes: Negative for blurred vision, pain, discharge and redness.   Respiratory: Negative for cough, sputum production, shortness of breath and stridor.    Cardiovascular: Negative for chest pain, palpitations and orthopnea.   Gastrointestinal: Negative for abdominal pain, diarrhea, heartburn, nausea and vomiting.   Genitourinary: Negative for dysuria, frequency and urgency.   Musculoskeletal: Positive for joint pain. Negative for back pain, myalgias and neck pain.   Skin: Negative for itching and rash.   Neurological: Negative for dizziness, focal weakness, seizures and headaches.   Psychiatric/Behavioral: Negative for depression. The patient is not nervous/anxious and does not have insomnia.         Physical Exam  Temp:  [36.3 °C (97.4 °F)-36.7 °C (98.1 °F)] 36.6 °C (97.9 °F)  Pulse:  [60-66] 61  Resp:  [17-19] 18  BP: (141-160)/(49-65) 160/56  SpO2:  [94 %-98 %] 94 %    Physical Exam   Constitutional: She is oriented to person, place, and time. No distress.   HENT:   Head: Normocephalic and atraumatic.   Mouth/Throat: Oropharynx is clear and moist.   Eyes: Pupils are equal, round, and reactive to light. Conjunctivae and EOM are normal.   Neck: Normal range of motion. Neck supple. No tracheal deviation present. No  thyromegaly present.   Cardiovascular: Normal rate and regular rhythm.    No murmur heard.  Pulmonary/Chest: Effort normal and breath sounds normal. No respiratory distress. She has no wheezes.   Abdominal: Soft. Bowel sounds are normal. She exhibits no distension. There is no tenderness.   Musculoskeletal: She exhibits tenderness. She exhibits no edema.   Right sciatic pain   Neurological: She is alert and oriented to person, place, and time. No cranial nerve deficit.   Skin: Skin is warm and dry. She is not diaphoretic. No erythema.   Psychiatric: She has a normal mood and affect. Her behavior is normal. Thought content normal.       Fluids    Intake/Output Summary (Last 24 hours) at 01/24/19 0844  Last data filed at 01/24/19 0730   Gross per 24 hour   Intake             1460 ml   Output             3900 ml   Net            -2440 ml       Laboratory  Recent Labs      01/22/19   2100  01/23/19   0327   WBC  12.7*  13.2*   RBC  3.37*  3.33*   HEMOGLOBIN  10.9*  10.8*   HEMATOCRIT  34.5*  33.5*   MCV  102.4*  100.6*   MCH  32.3  32.4   MCHC  31.6*  32.2*   RDW  47.8  46.8   PLATELETCT  226  232   MPV  9.3  9.4     Recent Labs      01/23/19   0327  01/23/19   1040  01/23/19   1358   SODIUM  135  133*  129*   POTASSIUM  6.5*  5.1  5.6*   CHLORIDE  103  99  97   CO2  23  28  27   GLUCOSE  133*  219*  148*   BUN  54*  56*  55*   CREATININE  1.83*  1.80*  1.99*   CALCIUM  9.5  9.1  9.6     Recent Labs      01/22/19   2100   APTT  31.1   INR  1.01     Recent Labs      01/22/19   2155   BNPBTYPENAT  41           Imaging  CT-LSPINE W/O PLUS RECONS   Final Result      1.  Moderate multilevel degenerative change of lumbar spine.   2.  No acute fracture or subluxation.   3.  Diffuse osteopenia.      CT-PELVIS W/O PLUS RECONS   Final Result      1.  No hip or pelvic fracture.   2.  Symmetric mild to moderate degenerative change of both hips.           Assessment/Plan  * Hyperkalemia- (present on admission)   Assessment & Plan     Likely related to lisinopril  Holding lisinopril  Monitor on tele  Hyperkalemic protocol with calcium gluconate, insulin/dextrose  K 5.6 this am  Follow bmp closely  To give calcium gluconate IV today       COPD (chronic obstructive pulmonary disease) ([facility])- (present on admission)   Assessment & Plan    Without current exacerbation, not on home oxygen therapy, continue current medical regimen.  Monitor.      Class 2 obesity due to excess calories without serious comorbidity with body mass index (BMI) of 36.0 to 36.9 in adult   Assessment & Plan    Body mass index is 36.61 kg/m².       Macrocytic anemia   Assessment & Plan    stable       HLD (hyperlipidemia)- (present on admission)   Assessment & Plan    On statin therapy      Type 2 diabetes mellitus with hyperglycemia, with long-term current use of insulin ([facility])- (present on admission)   Assessment & Plan    Start basal and prandial insulin dosing, monitor.       Chronic pain syndrome- (present on admission)   Assessment & Plan    Complaint about right sciatic pain  CT lumbar no acute finding  Only on very low dose oxycodone 2.5 mg q4-6 hrs as needed  Started flexaril  Pain control       Hypothyroidism- (present on admission)   Assessment & Plan    ON replacement therapy      Chronic kidney disease, stage 3 ([facility])- (present on admission)   Assessment & Plan    Stage III/IV  Cr stable at baseline  Follow cmp in am     Essential hypertension- (present on admission)   Assessment & Plan    Controlled with current medication regimen, hold lisinopril in the setting of significant hyperkalemia           VTE prophylaxis: heparin

## 2019-01-24 NOTE — PROGRESS NOTES
Assumed care of pt, she is alert and oriented. Discussed POC with pt and dayshift RN. Discussed safety and the need to call for assistance before getting up. Bed is locked in lowest position and call light/personal belongings are within reach.

## 2019-01-24 NOTE — CARE PLAN
Problem: Safety  Goal: Will remain free from injury  Outcome: PROGRESSING AS EXPECTED  Pt calls appropriately. All fall precautions in place.     Problem: Venous Thromboembolism (VTW)/Deep Vein Thrombosis (DVT) Prevention:  Goal: Patient will participate in Venous Thrombosis (VTE)/Deep Vein Thrombosis (DVT)Prevention Measures  Outcome: PROGRESSING SLOWER THAN EXPECTED  Pt declines heparin SQ and SCDs. Pt to edge of bed multiple times, up to commode, and worked with PT. This RN explained importance of heparin and SCDs

## 2019-01-24 NOTE — THERAPY
"Physical Therapy Evaluation completed.   Bed Mobility:  Supine to Sit: Minimal Assist  Transfers: Sit to Stand: Minimal Assist  Gait: Level Of Assist: Minimal Assist with Front-Wheel Walker       Plan of Care: Will benefit from Physical Therapy 3 times per week  Discharge Recommendations: Equipment: Will Continue to Assess for Equipment Needs. Post-acute therapy Recommend inpatient transitional care services for continued physical therapy services.        See \"Rehab Therapy-Acute\" Patient Summary Report for complete documentation.     "

## 2019-01-24 NOTE — THERAPY
"Occupational Therapy Evaluation completed.   Functional Status:  Min A supine>sit EOB, CGA w/LB dressing, SBA sit>stand walking in room w/SBA to bathroom, spv w/toilet txf, min A w/hygiene d/t IV. Returned BTB post session w/alarm on and call light w/in reach RN aware   Plan of Care: Will benefit from Occupational Therapy 2 times per week  Discharge Recommendations:  Equipment: Will Continue to Assess for Equipment Needs. Post-acute therapy Recommend home health or outpatient transitional care services for continued occupational therapy services      68 yr old female admitted for RLE pain. PMHX COPD, HTN, obesity, DM, CKD and hypothyroid. Currently dx w/hyperkalemia. Pt is has on going c/o RLE pain fluctuating w/activity, however w/some improvement today. Anticipate w/continued daily OOB activity and further medical management pt will improve in this setting to d/c home HH.     See \"Rehab Therapy-Acute\" Patient Summary Report for complete documentation.    "

## 2019-01-24 NOTE — PROGRESS NOTES
Received report from night staff. Assumed pt care. Pain level 4/10. AOX4. POC discussed. Tele monitor in place. Call light within reach, bed in lowest position, and personal items accessible.

## 2019-01-25 LAB
ANION GAP SERPL CALC-SCNC: 3 MMOL/L (ref 0–11.9)
BUN SERPL-MCNC: 59 MG/DL (ref 8–22)
CALCIUM SERPL-MCNC: 9.6 MG/DL (ref 8.5–10.5)
CHLORIDE SERPL-SCNC: 97 MMOL/L (ref 96–112)
CO2 SERPL-SCNC: 26 MMOL/L (ref 20–33)
CREAT SERPL-MCNC: 1.88 MG/DL (ref 0.5–1.4)
GLUCOSE BLD-MCNC: 103 MG/DL (ref 65–99)
GLUCOSE BLD-MCNC: 106 MG/DL (ref 65–99)
GLUCOSE BLD-MCNC: 118 MG/DL (ref 65–99)
GLUCOSE BLD-MCNC: 138 MG/DL (ref 65–99)
GLUCOSE SERPL-MCNC: 142 MG/DL (ref 65–99)
POTASSIUM SERPL-SCNC: 5.8 MMOL/L (ref 3.6–5.5)
SODIUM SERPL-SCNC: 126 MMOL/L (ref 135–145)

## 2019-01-25 PROCEDURE — A9270 NON-COVERED ITEM OR SERVICE: HCPCS | Performed by: INTERNAL MEDICINE

## 2019-01-25 PROCEDURE — A9270 NON-COVERED ITEM OR SERVICE: HCPCS | Performed by: HOSPITALIST

## 2019-01-25 PROCEDURE — 770020 HCHG ROOM/CARE - TELE (206)

## 2019-01-25 PROCEDURE — 80048 BASIC METABOLIC PNL TOTAL CA: CPT

## 2019-01-25 PROCEDURE — 82962 GLUCOSE BLOOD TEST: CPT

## 2019-01-25 PROCEDURE — 82088 ASSAY OF ALDOSTERONE: CPT

## 2019-01-25 PROCEDURE — 700101 HCHG RX REV CODE 250: Performed by: INTERNAL MEDICINE

## 2019-01-25 PROCEDURE — 99232 SBSQ HOSP IP/OBS MODERATE 35: CPT | Performed by: INTERNAL MEDICINE

## 2019-01-25 PROCEDURE — 700102 HCHG RX REV CODE 250 W/ 637 OVERRIDE(OP): Performed by: INTERNAL MEDICINE

## 2019-01-25 PROCEDURE — 36415 COLL VENOUS BLD VENIPUNCTURE: CPT

## 2019-01-25 PROCEDURE — 700102 HCHG RX REV CODE 250 W/ 637 OVERRIDE(OP): Performed by: HOSPITALIST

## 2019-01-25 RX ORDER — OXYCODONE HYDROCHLORIDE 10 MG/1
10 TABLET ORAL EVERY 4 HOURS PRN
Status: DISCONTINUED | OUTPATIENT
Start: 2019-01-25 | End: 2019-01-30 | Stop reason: HOSPADM

## 2019-01-25 RX ORDER — DEXTROSE MONOHYDRATE 25 G/50ML
25 INJECTION, SOLUTION INTRAVENOUS ONCE
Status: COMPLETED | OUTPATIENT
Start: 2019-01-25 | End: 2019-01-25

## 2019-01-25 RX ADMIN — CYCLOBENZAPRINE 5 MG: 10 TABLET, FILM COATED ORAL at 11:52

## 2019-01-25 RX ADMIN — AMLODIPINE BESYLATE 10 MG: 10 TABLET ORAL at 05:38

## 2019-01-25 RX ADMIN — INSULIN LISPRO 3 UNITS: 100 INJECTION, SOLUTION INTRAVENOUS; SUBCUTANEOUS at 17:07

## 2019-01-25 RX ADMIN — INSULIN LISPRO 3 UNITS: 100 INJECTION, SOLUTION INTRAVENOUS; SUBCUTANEOUS at 05:42

## 2019-01-25 RX ADMIN — DEXTROSE MONOHYDRATE 50 ML: 500 INJECTION PARENTERAL at 08:34

## 2019-01-25 RX ADMIN — OXYCODONE HYDROCHLORIDE 2.5 MG: 5 TABLET ORAL at 04:37

## 2019-01-25 RX ADMIN — OXYCODONE HYDROCHLORIDE 2.5 MG: 5 TABLET ORAL at 00:29

## 2019-01-25 RX ADMIN — OXYCODONE HYDROCHLORIDE 10 MG: 10 TABLET ORAL at 09:58

## 2019-01-25 RX ADMIN — OXYCODONE HYDROCHLORIDE 10 MG: 10 TABLET ORAL at 15:11

## 2019-01-25 RX ADMIN — IPRATROPIUM BROMIDE 2 PUFF: 17 AEROSOL, METERED RESPIRATORY (INHALATION) at 11:11

## 2019-01-25 RX ADMIN — IPRATROPIUM BROMIDE 2 PUFF: 17 AEROSOL, METERED RESPIRATORY (INHALATION) at 17:06

## 2019-01-25 RX ADMIN — OXYCODONE HYDROCHLORIDE 2.5 MG: 5 TABLET ORAL at 08:53

## 2019-01-25 RX ADMIN — CYCLOBENZAPRINE 5 MG: 10 TABLET, FILM COATED ORAL at 05:51

## 2019-01-25 RX ADMIN — ACETAMINOPHEN 650 MG: 325 TABLET, FILM COATED ORAL at 19:44

## 2019-01-25 RX ADMIN — DULOXETINE HYDROCHLORIDE 60 MG: 60 CAPSULE, DELAYED RELEASE ORAL at 05:38

## 2019-01-25 RX ADMIN — ROSUVASTATIN CALCIUM 10 MG: 20 TABLET, FILM COATED ORAL at 17:05

## 2019-01-25 RX ADMIN — LEVOTHYROXINE SODIUM 75 MCG: 75 TABLET ORAL at 04:37

## 2019-01-25 RX ADMIN — NICOTINE 14 MG: 14 PATCH, EXTENDED RELEASE TRANSDERMAL at 05:50

## 2019-01-25 RX ADMIN — CYCLOBENZAPRINE 5 MG: 10 TABLET, FILM COATED ORAL at 19:44

## 2019-01-25 RX ADMIN — BUDESONIDE AND FORMOTEROL FUMARATE DIHYDRATE 2 PUFF: 160; 4.5 AEROSOL RESPIRATORY (INHALATION) at 17:06

## 2019-01-25 RX ADMIN — PATIROMER 8.4 G: 8.4 POWDER, FOR SUSPENSION ORAL at 05:42

## 2019-01-25 RX ADMIN — OXYCODONE HYDROCHLORIDE 10 MG: 10 TABLET ORAL at 21:23

## 2019-01-25 RX ADMIN — BUDESONIDE AND FORMOTEROL FUMARATE DIHYDRATE 2 PUFF: 160; 4.5 AEROSOL RESPIRATORY (INHALATION) at 05:50

## 2019-01-25 RX ADMIN — Medication 2 TABLET: at 05:38

## 2019-01-25 RX ADMIN — INSULIN HUMAN 10 UNITS: 100 INJECTION, SOLUTION PARENTERAL at 08:34

## 2019-01-25 RX ADMIN — INSULIN GLARGINE 10 UNITS: 100 INJECTION, SOLUTION SUBCUTANEOUS at 17:08

## 2019-01-25 RX ADMIN — INSULIN LISPRO 3 UNITS: 100 INJECTION, SOLUTION INTRAVENOUS; SUBCUTANEOUS at 11:08

## 2019-01-25 ASSESSMENT — ENCOUNTER SYMPTOMS
COUGH: 0
NECK PAIN: 0
NERVOUS/ANXIOUS: 0
DEPRESSION: 0
HEADACHES: 0
FOCAL WEAKNESS: 0
FEVER: 0
EYE PAIN: 0
NAUSEA: 0
HEARTBURN: 0
SPUTUM PRODUCTION: 0
VOMITING: 0
STRIDOR: 0
DIZZINESS: 0
WEIGHT LOSS: 0
EYE DISCHARGE: 0
ABDOMINAL PAIN: 0
INSOMNIA: 0
BLURRED VISION: 0
ORTHOPNEA: 0
BACK PAIN: 0
MYALGIAS: 0
CHILLS: 0

## 2019-01-25 NOTE — CARE PLAN
Problem: Bowel/Gastric:  Goal: Normal bowel function is maintained or improved  Pt having normal/regular BMs per pt baseline      Problem: Pain Management  Goal: Pain level will decrease to patient's comfort goal  Q4 pain assessments in place. Pt educated on pain scale. RN aware of pt's goal pain. PRN medication orders followed. MD updated on current pain regimen not effective for pt. Updated orders in place.

## 2019-01-25 NOTE — DISCHARGE PLANNING
Anticipated Discharge Disposition: D/C to SNF vs Home with HH    Action: LSW was stopped by pt when walking by. Pt reported she does not want to go to SNF because she needs to work on selling her house and getting into an assisted living facility. Pt has already reached out to [facility], they have a waiting list. Pt reports she has a family member that she could stay with for a few days and she could also access HH. LSW updated MD about pt's wishes and concerns.     Barriers to Discharge: SNF acceptance.    Plan: Weekend LSW to f/u with MD regarding d/c plan.

## 2019-01-25 NOTE — THERAPY
"Physical Therapy Treatment completed.   Bed Mobility:  Supine to Sit: Minimal Assist  Transfers: Sit to Stand: Stand by Assist  Gait: Level Of Assist: Contact Guard Assist with Front-Wheel Walker       Plan of Care: Will benefit from Physical Therapy 4 times per week  Discharge Recommendations: Equipment: Will Continue to Assess for Equipment Needs; has FWW and power w/c     Pt reporting improving pain; exam does not follow a sciatica distribution (though dermatome testing complicated by chronic neuropathy) as she reports it radiates to anterior knee/distal femur at times though appears she is having a difficult time localizing pain but does report waking up with the onset of pain with her right leg hanging off bed therefore strain (could be of muscle or nerve) is more likely and consistent with her painful arch of motion with knee flexion and hip extension; has extreme pain with minimal palpation along glute med/piriformis but slightly out of proportion to stimuli; no centralization techniques helped (afia standing extension or side bending) nor could pain be reproduced with spinal mobilizations; at this time, recommend pt pursue outpt PT as soon as possible, orthopedic clinical specialist if possible, for a full thorough exam of right lumbar/LE;     See \"Rehab Therapy-Acute\" Patient Summary Report for complete documentation.       "

## 2019-01-25 NOTE — DISCHARGE PLANNING
Received Choice form at 1025  Agency/Facility Name: [facility]  Referral sent per Choice form @ [id number]

## 2019-01-25 NOTE — DISCHARGE PLANNING
Agency/Facility Name: [facility]   Spoke To: Cliff  Outcome: Patient is accepted pending bed availability.     Agency/Facility Name: [facility]  Spoke To: Omari  Outcome: Patient is accepted pending bed availability.

## 2019-01-25 NOTE — PROGRESS NOTES
Hospital Medicine Daily Progress Note    Date of Service  1/25/2019    Chief Complaint  68 y.o. female admitted 1/22/2019 with RLE pain    Hospital Course    69 y/o F with PMH of COPD, HTN, hypothyroid came in with above and found to have hyperkalemia with K 7.3.      Interval Problem Update  Still having right sciatic pain in her leg. She stated that she takes oxycodone 10 mg q4hrs prn at home.   Also explained to her about her potassium level and pending additional test.    Consultants/Specialty  none    Code Status  full    Disposition  Remain on the floor    Review of Systems  Review of Systems   Constitutional: Negative for chills, fever and weight loss.   HENT: Negative for congestion and nosebleeds.    Eyes: Negative for blurred vision, pain and discharge.   Respiratory: Negative for cough, sputum production and stridor.    Cardiovascular: Negative for chest pain and orthopnea.   Gastrointestinal: Negative for abdominal pain, heartburn, nausea and vomiting.   Genitourinary: Negative for dysuria and frequency.   Musculoskeletal: Positive for joint pain. Negative for back pain, myalgias and neck pain.   Skin: Negative for rash.   Neurological: Negative for dizziness, focal weakness and headaches.   Psychiatric/Behavioral: Negative for depression. The patient is not nervous/anxious and does not have insomnia.         Physical Exam  Temp:  [35.9 °C (96.6 °F)-36.7 °C (98 °F)] 36.7 °C (98 °F)  Pulse:  [52-68] 56  Resp:  [16-24] 16  BP: (124-160)/(49-76) 145/59  SpO2:  [96 %-98 %] 97 %    Physical Exam   Constitutional: She is oriented to person, place, and time. No distress.   HENT:   Head: Normocephalic and atraumatic.   Eyes: Pupils are equal, round, and reactive to light. EOM are normal.   Neck: Normal range of motion. No tracheal deviation present. No thyromegaly present.   Cardiovascular: Normal rate and regular rhythm.    Pulmonary/Chest: Effort normal and breath sounds normal. No respiratory distress.    Abdominal: Soft. Bowel sounds are normal. She exhibits no distension.   Musculoskeletal: She exhibits tenderness. She exhibits no edema.   Right sciatic pain   Neurological: She is alert and oriented to person, place, and time. No cranial nerve deficit.   Skin: Skin is warm and dry. She is not diaphoretic. No erythema.   Psychiatric: She has a normal mood and affect. Her behavior is normal. Thought content normal.       Fluids    Intake/Output Summary (Last 24 hours) at 01/25/19 0728  Last data filed at 01/25/19 0434   Gross per 24 hour   Intake             1380 ml   Output             4850 ml   Net            -3470 ml       Laboratory  Recent Labs      01/22/19   2100  01/23/19   0327   WBC  12.7*  13.2*   RBC  3.37*  3.33*   HEMOGLOBIN  10.9*  10.8*   HEMATOCRIT  34.5*  33.5*   MCV  102.4*  100.6*   MCH  32.3  32.4   MCHC  31.6*  32.2*   RDW  47.8  46.8   PLATELETCT  226  232   MPV  9.3  9.4     Recent Labs      01/23/19   1040  01/23/19   1358  01/25/19   0040   SODIUM  133*  129*  126*   POTASSIUM  5.1  5.6*  5.8*   CHLORIDE  99  97  97   CO2  28  27  26   GLUCOSE  219*  148*  142*   BUN  56*  55*  59*   CREATININE  1.80*  1.99*  1.88*   CALCIUM  9.1  9.6  9.6     Recent Labs      01/22/19   2100   APTT  31.1   INR  1.01     Recent Labs      01/22/19   2155   BNPBTYPENAT  41           Imaging  CT-LSPINE W/O PLUS RECONS   Final Result      1.  Moderate multilevel degenerative change of lumbar spine.   2.  No acute fracture or subluxation.   3.  Diffuse osteopenia.      CT-PELVIS W/O PLUS RECONS   Final Result      1.  No hip or pelvic fracture.   2.  Symmetric mild to moderate degenerative change of both hips.           Assessment/Plan  * Hyperkalemia- (present on admission)   Assessment & Plan    Likely related to lisinopril  Holding lisinopril  Monitor on tele  Hyperkalemic protocol with calcium gluconate, insulin/dextrose  K 5.8 this am, worsening  Will check aldosterone level  Follow bmp closely  If  persistently elevated: will consider consulting nephrology       COPD (chronic obstructive pulmonary disease) ([facility])- (present on admission)   Assessment & Plan    Without current exacerbation, not on home oxygen therapy, continue current medical regimen.  Monitor.      Class 2 obesity due to excess calories without serious comorbidity with body mass index (BMI) of 36.0 to 36.9 in adult   Assessment & Plan    Body mass index is 36.61 kg/m².       Macrocytic anemia   Assessment & Plan    stable       HLD (hyperlipidemia)- (present on admission)   Assessment & Plan    On statin therapy      Type 2 diabetes mellitus with hyperglycemia, with long-term current use of insulin ([facility])- (present on admission)   Assessment & Plan    Start basal and prandial insulin dosing, monitor.       Chronic pain syndrome- (present on admission)   Assessment & Plan    Complaint about right sciatic pain  CT lumbar no acute finding  Continue home oxycodone 10 mg q4-6 hrs as needed  Started flexaril  Pain control       Hypothyroidism- (present on admission)   Assessment & Plan    ON replacement therapy      Chronic kidney disease, stage 3 ([facility])- (present on admission)   Assessment & Plan    Stage III/IV  Cr stable at baseline  Follow cmp in am     Essential hypertension- (present on admission)   Assessment & Plan    Controlled with current medication regimen, hold lisinopril in the setting of significant hyperkalemia           VTE prophylaxis: heparin

## 2019-01-25 NOTE — DISCHARGE PLANNING
Anticipated Discharge Disposition: D/C to SNF    Action: LSW met with pt at bedside to discuss d/c planning and referrals. Pt reports she has been to a SNF before ([facility]). Pt open to sending [facility] to [facility] and [facility]. LSW faxed choice to [facility] for processing.    Barriers to Discharge: SNF acceptance.    Plan: LSW to f/u with pt regarding accepting facilities with bed availability once responses have been received.

## 2019-01-26 LAB
ALDOST SERPL-MCNC: 14.4 NG/DL
ANION GAP SERPL CALC-SCNC: 6 MMOL/L (ref 0–11.9)
BUN SERPL-MCNC: 62 MG/DL (ref 8–22)
CALCIUM SERPL-MCNC: 9.5 MG/DL (ref 8.5–10.5)
CHLORIDE SERPL-SCNC: 100 MMOL/L (ref 96–112)
CO2 SERPL-SCNC: 26 MMOL/L (ref 20–33)
CREAT SERPL-MCNC: 1.81 MG/DL (ref 0.5–1.4)
GLUCOSE BLD-MCNC: 101 MG/DL (ref 65–99)
GLUCOSE BLD-MCNC: 122 MG/DL (ref 65–99)
GLUCOSE BLD-MCNC: 166 MG/DL (ref 65–99)
GLUCOSE BLD-MCNC: 307 MG/DL (ref 65–99)
GLUCOSE BLD-MCNC: 71 MG/DL (ref 65–99)
GLUCOSE SERPL-MCNC: 140 MG/DL (ref 65–99)
POTASSIUM SERPL-SCNC: 5.9 MMOL/L (ref 3.6–5.5)
SODIUM SERPL-SCNC: 132 MMOL/L (ref 135–145)

## 2019-01-26 PROCEDURE — A9270 NON-COVERED ITEM OR SERVICE: HCPCS | Performed by: HOSPITALIST

## 2019-01-26 PROCEDURE — 770020 HCHG ROOM/CARE - TELE (206)

## 2019-01-26 PROCEDURE — 700101 HCHG RX REV CODE 250: Performed by: INTERNAL MEDICINE

## 2019-01-26 PROCEDURE — 700102 HCHG RX REV CODE 250 W/ 637 OVERRIDE(OP): Performed by: HOSPITALIST

## 2019-01-26 PROCEDURE — A9270 NON-COVERED ITEM OR SERVICE: HCPCS | Performed by: INTERNAL MEDICINE

## 2019-01-26 PROCEDURE — 99232 SBSQ HOSP IP/OBS MODERATE 35: CPT | Performed by: INTERNAL MEDICINE

## 2019-01-26 PROCEDURE — 36415 COLL VENOUS BLD VENIPUNCTURE: CPT

## 2019-01-26 PROCEDURE — 97530 THERAPEUTIC ACTIVITIES: CPT

## 2019-01-26 PROCEDURE — 82962 GLUCOSE BLOOD TEST: CPT

## 2019-01-26 PROCEDURE — 700102 HCHG RX REV CODE 250 W/ 637 OVERRIDE(OP): Performed by: INTERNAL MEDICINE

## 2019-01-26 PROCEDURE — 80048 BASIC METABOLIC PNL TOTAL CA: CPT

## 2019-01-26 PROCEDURE — 97116 GAIT TRAINING THERAPY: CPT

## 2019-01-26 RX ORDER — DEXTROSE MONOHYDRATE 25 G/50ML
50 INJECTION, SOLUTION INTRAVENOUS ONCE
Status: COMPLETED | OUTPATIENT
Start: 2019-01-26 | End: 2019-01-26

## 2019-01-26 RX ADMIN — BUDESONIDE AND FORMOTEROL FUMARATE DIHYDRATE 2 PUFF: 160; 4.5 AEROSOL RESPIRATORY (INHALATION) at 06:15

## 2019-01-26 RX ADMIN — INSULIN LISPRO 1 UNITS: 100 INJECTION, SOLUTION INTRAVENOUS; SUBCUTANEOUS at 18:39

## 2019-01-26 RX ADMIN — INSULIN HUMAN 10 UNITS: 100 INJECTION, SOLUTION PARENTERAL at 10:08

## 2019-01-26 RX ADMIN — CYCLOBENZAPRINE 5 MG: 10 TABLET, FILM COATED ORAL at 12:50

## 2019-01-26 RX ADMIN — ROSUVASTATIN CALCIUM 10 MG: 20 TABLET, FILM COATED ORAL at 18:34

## 2019-01-26 RX ADMIN — Medication 2 TABLET: at 18:34

## 2019-01-26 RX ADMIN — PATIROMER 8.4 G: 8.4 POWDER, FOR SUSPENSION ORAL at 06:15

## 2019-01-26 RX ADMIN — BUDESONIDE AND FORMOTEROL FUMARATE DIHYDRATE 2 PUFF: 160; 4.5 AEROSOL RESPIRATORY (INHALATION) at 18:35

## 2019-01-26 RX ADMIN — AMLODIPINE BESYLATE 10 MG: 10 TABLET ORAL at 06:15

## 2019-01-26 RX ADMIN — INSULIN GLARGINE 10 UNITS: 100 INJECTION, SOLUTION SUBCUTANEOUS at 18:40

## 2019-01-26 RX ADMIN — OXYCODONE HYDROCHLORIDE 10 MG: 10 TABLET ORAL at 01:56

## 2019-01-26 RX ADMIN — OXYCODONE HYDROCHLORIDE 10 MG: 10 TABLET ORAL at 06:16

## 2019-01-26 RX ADMIN — NICOTINE 14 MG: 14 PATCH, EXTENDED RELEASE TRANSDERMAL at 06:16

## 2019-01-26 RX ADMIN — LEVOTHYROXINE SODIUM 75 MCG: 75 TABLET ORAL at 06:15

## 2019-01-26 RX ADMIN — OXYCODONE HYDROCHLORIDE 10 MG: 10 TABLET ORAL at 20:23

## 2019-01-26 RX ADMIN — MINERAL OIL AND WHITE PETROLATUM 1 APPLICATION: 150; 830 OINTMENT OPHTHALMIC at 21:53

## 2019-01-26 RX ADMIN — OXYCODONE HYDROCHLORIDE 10 MG: 10 TABLET ORAL at 15:06

## 2019-01-26 RX ADMIN — IPRATROPIUM BROMIDE 2 PUFF: 17 AEROSOL, METERED RESPIRATORY (INHALATION) at 18:35

## 2019-01-26 RX ADMIN — IPRATROPIUM BROMIDE 2 PUFF: 17 AEROSOL, METERED RESPIRATORY (INHALATION) at 06:15

## 2019-01-26 RX ADMIN — OXYCODONE HYDROCHLORIDE 10 MG: 10 TABLET ORAL at 10:39

## 2019-01-26 RX ADMIN — AMITRIPTYLINE HYDROCHLORIDE 50 MG: 50 TABLET, FILM COATED ORAL at 21:53

## 2019-01-26 RX ADMIN — CYCLOBENZAPRINE 5 MG: 10 TABLET, FILM COATED ORAL at 23:37

## 2019-01-26 RX ADMIN — DEXTROSE MONOHYDRATE 50 ML: 500 INJECTION PARENTERAL at 10:06

## 2019-01-26 RX ADMIN — DULOXETINE HYDROCHLORIDE 60 MG: 60 CAPSULE, DELAYED RELEASE ORAL at 06:15

## 2019-01-26 ASSESSMENT — ENCOUNTER SYMPTOMS
EYE PAIN: 0
HEARTBURN: 0
WEIGHT LOSS: 0
STRIDOR: 0
DIZZINESS: 0
ORTHOPNEA: 0
HEADACHES: 0
ABDOMINAL PAIN: 0
FEVER: 0
COUGH: 0
EYE DISCHARGE: 0
NERVOUS/ANXIOUS: 0
DEPRESSION: 0
CHILLS: 0
SPUTUM PRODUCTION: 0
BLURRED VISION: 0
NAUSEA: 0
BACK PAIN: 0
MYALGIAS: 0
FOCAL WEAKNESS: 0

## 2019-01-26 ASSESSMENT — GAIT ASSESSMENTS
DISTANCE (FEET): 100
ASSISTIVE DEVICE: FRONT WHEEL WALKER
DEVIATION: BRADYKINETIC;SHUFFLED GAIT;ANTALGIC
GAIT LEVEL OF ASSIST: STAND BY ASSIST

## 2019-01-26 ASSESSMENT — COGNITIVE AND FUNCTIONAL STATUS - GENERAL
STANDING UP FROM CHAIR USING ARMS: A LITTLE
SUGGESTED CMS G CODE MODIFIER MOBILITY: CK
CLIMB 3 TO 5 STEPS WITH RAILING: A LITTLE
TURNING FROM BACK TO SIDE WHILE IN FLAT BAD: A LITTLE
MOBILITY SCORE: 18
MOVING TO AND FROM BED TO CHAIR: A LITTLE
WALKING IN HOSPITAL ROOM: A LITTLE
MOVING FROM LYING ON BACK TO SITTING ON SIDE OF FLAT BED: A LITTLE

## 2019-01-26 NOTE — PROGRESS NOTES
Hospital Medicine Daily Progress Note    Date of Service  1/26/2019    Chief Complaint  68 y.o. female admitted 1/22/2019 with RLE pain    Hospital Course    69 y/o F with PMH of COPD, HTN, hypothyroid came in with above and found to have hyperkalemia with K 7.3.      Interval Problem Update  explained to her in detail about her potassium level and pending additional test. No acute event overnight. Denies chest pain, palpitation.    Consultants/Specialty  none    Code Status  full    Disposition  Remain on the floor    Review of Systems  Review of Systems   Constitutional: Negative for chills, fever and weight loss.   HENT: Negative for congestion and nosebleeds.    Eyes: Negative for blurred vision, pain and discharge.   Respiratory: Negative for cough, sputum production and stridor.    Cardiovascular: Negative for chest pain and orthopnea.   Gastrointestinal: Negative for abdominal pain, heartburn and nausea.   Genitourinary: Negative for dysuria and frequency.   Musculoskeletal: Positive for joint pain. Negative for back pain and myalgias.   Skin: Negative for rash.   Neurological: Negative for dizziness, focal weakness and headaches.   Psychiatric/Behavioral: Negative for depression. The patient is not nervous/anxious.         Physical Exam  Temp:  [36.2 °C (97.2 °F)-36.7 °C (98.1 °F)] 36.2 °C (97.2 °F)  Pulse:  [63-65] 64  Resp:  [18] 18  BP: (116-140)/(41-65) 140/59  SpO2:  [93 %-96 %] 93 %    Physical Exam   Constitutional: She is oriented to person, place, and time. No distress.   HENT:   Head: Normocephalic and atraumatic.   Eyes: Pupils are equal, round, and reactive to light. EOM are normal.   Neck: Normal range of motion. No thyromegaly present.   Cardiovascular: Normal rate and regular rhythm.    Pulmonary/Chest: Effort normal. No respiratory distress.   Abdominal: Soft. Bowel sounds are normal. She exhibits no distension.   Musculoskeletal: She exhibits tenderness. She exhibits no edema.   Right  sciatic pain   Neurological: She is alert and oriented to person, place, and time.   Skin: Skin is warm and dry. She is not diaphoretic. No erythema.   Psychiatric: She has a normal mood and affect. Thought content normal.       Fluids    Intake/Output Summary (Last 24 hours) at 01/26/19 0848  Last data filed at 01/25/19 0955   Gross per 24 hour   Intake                0 ml   Output              150 ml   Net             -150 ml       Laboratory      Recent Labs      01/23/19   1358  01/25/19   0040  01/26/19   0724   SODIUM  129*  126*  132*   POTASSIUM  5.6*  5.8*  5.9*   CHLORIDE  97  97  100   CO2  27  26  26   GLUCOSE  148*  142*  140*   BUN  55*  59*  62*   CREATININE  1.99*  1.88*  1.81*   CALCIUM  9.6  9.6  9.5                   Imaging  CT-LSPINE W/O PLUS RECONS   Final Result      1.  Moderate multilevel degenerative change of lumbar spine.   2.  No acute fracture or subluxation.   3.  Diffuse osteopenia.      CT-PELVIS W/O PLUS RECONS   Final Result      1.  No hip or pelvic fracture.   2.  Symmetric mild to moderate degenerative change of both hips.           Assessment/Plan  * Hyperkalemia- (present on admission)   Assessment & Plan    Likely related to lisinopril vs aldosterone deficiency?  Holding lisinopril  Monitor on tele  Hyperkalemic protocol with calcium gluconate, insulin/dextrose  K 5.9 this am, worsening  aldosterone level: pending  Follow bmp closely  If persistently elevated: will consider consulting nephrology       COPD (chronic obstructive pulmonary disease) ([facility])- (present on admission)   Assessment & Plan    Without current exacerbation, not on home oxygen therapy, continue current medical regimen.  Monitor.      Class 2 obesity due to excess calories without serious comorbidity with body mass index (BMI) of 36.0 to 36.9 in adult   Assessment & Plan    Body mass index is 36.61 kg/m².       Macrocytic anemia   Assessment & Plan    stable       HLD (hyperlipidemia)- (present on admission)    Assessment & Plan    On statin therapy      Type 2 diabetes mellitus with hyperglycemia, with long-term current use of insulin ([facility])- (present on admission)   Assessment & Plan    Start basal and prandial insulin dosing, monitor.       Chronic pain syndrome- (present on admission)   Assessment & Plan    Complaint about right sciatic pain  CT lumbar no acute finding  Continue home oxycodone 10 mg q4-6 hrs as needed  Started flexaril  Pain control  PT/OT       Hypothyroidism- (present on admission)   Assessment & Plan    ON replacement therapy      Chronic kidney disease, stage 3 ([facility])- (present on admission)   Assessment & Plan    Stage III/IV  Cr stable at baseline  Follow cmp in am     Essential hypertension- (present on admission)   Assessment & Plan    Controlled with current medication regimen, hold lisinopril in the setting of significant hyperkalemia           VTE prophylaxis: heparin

## 2019-01-26 NOTE — THERAPY
"Physical Therapy Treatment completed.   Bed Mobility:  Supine to Sit: Stand by Assist (HOB elevated )  Transfers: Sit to Stand: Stand by Assist  Gait: Level Of Assist: Stand by Assist with Front-Wheel Walker       Plan of Care: Will benefit from Physical Therapy 4 times per week  Discharge Recommendations: Equipment: Will Continue to Assess for Equipment Needs.   See \"Rehab Therapy-Acute\" Patient Summary Report for complete documentation.     Pt pleasant and motivated to participate. Pt was able to increase total gait dsitance with fww and SBA. She present with slow ramona but steady with no LOB. Pt did start to present antalgic gait pattern during last 30ft of gait as she reported pain in RLE increased.As she did not have any when first ambulating. Pt able to perform all transfers with SBA and use of fww. Pt utilized bed features as she reports she has both kinds of beds at home one flat and one that raises. Pt will benefit from continued acute skilled therapy while here to progress mobility. Encouraged pt to continue to increase mobility with nursing staff. Will continue to follow while here.   "

## 2019-01-26 NOTE — PROGRESS NOTES
Received report from night shift RN Kay. Assumed care of patient. Patient is SR on the monitor at this time. Patient is sitting up in bed with no family present at bedside at this time. Patient declines any needs at this time. Plan of care discussed with patient. Bed locked and in the lowest position with call light within reach.

## 2019-01-27 LAB
ANION GAP SERPL CALC-SCNC: 6 MMOL/L (ref 0–11.9)
BUN SERPL-MCNC: 61 MG/DL (ref 8–22)
CALCIUM SERPL-MCNC: 9.2 MG/DL (ref 8.5–10.5)
CHLORIDE SERPL-SCNC: 102 MMOL/L (ref 96–112)
CO2 SERPL-SCNC: 23 MMOL/L (ref 20–33)
CREAT SERPL-MCNC: 1.78 MG/DL (ref 0.5–1.4)
GLUCOSE BLD-MCNC: 116 MG/DL (ref 65–99)
GLUCOSE BLD-MCNC: 116 MG/DL (ref 65–99)
GLUCOSE BLD-MCNC: 118 MG/DL (ref 65–99)
GLUCOSE BLD-MCNC: 152 MG/DL (ref 65–99)
GLUCOSE SERPL-MCNC: 165 MG/DL (ref 65–99)
POTASSIUM SERPL-SCNC: 5.9 MMOL/L (ref 3.6–5.5)
SODIUM SERPL-SCNC: 131 MMOL/L (ref 135–145)

## 2019-01-27 PROCEDURE — 700111 HCHG RX REV CODE 636 W/ 250 OVERRIDE (IP): Performed by: HOSPITALIST

## 2019-01-27 PROCEDURE — 99232 SBSQ HOSP IP/OBS MODERATE 35: CPT | Performed by: INTERNAL MEDICINE

## 2019-01-27 PROCEDURE — A9270 NON-COVERED ITEM OR SERVICE: HCPCS | Performed by: HOSPITALIST

## 2019-01-27 PROCEDURE — 36415 COLL VENOUS BLD VENIPUNCTURE: CPT

## 2019-01-27 PROCEDURE — A9270 NON-COVERED ITEM OR SERVICE: HCPCS | Performed by: INTERNAL MEDICINE

## 2019-01-27 PROCEDURE — 700102 HCHG RX REV CODE 250 W/ 637 OVERRIDE(OP): Performed by: HOSPITALIST

## 2019-01-27 PROCEDURE — 700102 HCHG RX REV CODE 250 W/ 637 OVERRIDE(OP): Performed by: INTERNAL MEDICINE

## 2019-01-27 PROCEDURE — 99222 1ST HOSP IP/OBS MODERATE 55: CPT | Performed by: INTERNAL MEDICINE

## 2019-01-27 PROCEDURE — 80048 BASIC METABOLIC PNL TOTAL CA: CPT

## 2019-01-27 PROCEDURE — 770020 HCHG ROOM/CARE - TELE (206)

## 2019-01-27 PROCEDURE — 82962 GLUCOSE BLOOD TEST: CPT | Mod: 91

## 2019-01-27 RX ADMIN — Medication 2 TABLET: at 17:51

## 2019-01-27 RX ADMIN — AMLODIPINE BESYLATE 10 MG: 10 TABLET ORAL at 05:48

## 2019-01-27 RX ADMIN — HEPARIN SODIUM 5000 UNITS: 5000 INJECTION, SOLUTION INTRAVENOUS; SUBCUTANEOUS at 15:30

## 2019-01-27 RX ADMIN — BUDESONIDE AND FORMOTEROL FUMARATE DIHYDRATE 2 PUFF: 160; 4.5 AEROSOL RESPIRATORY (INHALATION) at 17:53

## 2019-01-27 RX ADMIN — IPRATROPIUM BROMIDE 2 PUFF: 17 AEROSOL, METERED RESPIRATORY (INHALATION) at 05:49

## 2019-01-27 RX ADMIN — ROSUVASTATIN CALCIUM 10 MG: 20 TABLET, FILM COATED ORAL at 17:51

## 2019-01-27 RX ADMIN — IPRATROPIUM BROMIDE 2 PUFF: 17 AEROSOL, METERED RESPIRATORY (INHALATION) at 17:53

## 2019-01-27 RX ADMIN — DULOXETINE HYDROCHLORIDE 60 MG: 60 CAPSULE, DELAYED RELEASE ORAL at 05:49

## 2019-01-27 RX ADMIN — INSULIN LISPRO 1 UNITS: 100 INJECTION, SOLUTION INTRAVENOUS; SUBCUTANEOUS at 22:05

## 2019-01-27 RX ADMIN — CYCLOBENZAPRINE 5 MG: 10 TABLET, FILM COATED ORAL at 19:22

## 2019-01-27 RX ADMIN — ACETAMINOPHEN 650 MG: 325 TABLET, FILM COATED ORAL at 19:23

## 2019-01-27 RX ADMIN — INSULIN GLARGINE 10 UNITS: 100 INJECTION, SOLUTION SUBCUTANEOUS at 17:54

## 2019-01-27 RX ADMIN — NICOTINE 14 MG: 14 PATCH, EXTENDED RELEASE TRANSDERMAL at 05:48

## 2019-01-27 RX ADMIN — OXYCODONE HYDROCHLORIDE 10 MG: 10 TABLET ORAL at 19:50

## 2019-01-27 RX ADMIN — IPRATROPIUM BROMIDE 2 PUFF: 17 AEROSOL, METERED RESPIRATORY (INHALATION) at 12:17

## 2019-01-27 RX ADMIN — OXYCODONE HYDROCHLORIDE 10 MG: 10 TABLET ORAL at 01:03

## 2019-01-27 RX ADMIN — OXYCODONE HYDROCHLORIDE 10 MG: 10 TABLET ORAL at 11:05

## 2019-01-27 RX ADMIN — LEVOTHYROXINE SODIUM 75 MCG: 75 TABLET ORAL at 05:49

## 2019-01-27 RX ADMIN — OXYCODONE HYDROCHLORIDE 10 MG: 10 TABLET ORAL at 05:49

## 2019-01-27 RX ADMIN — BUDESONIDE AND FORMOTEROL FUMARATE DIHYDRATE 2 PUFF: 160; 4.5 AEROSOL RESPIRATORY (INHALATION) at 05:49

## 2019-01-27 RX ADMIN — Medication 2 TABLET: at 05:49

## 2019-01-27 RX ADMIN — PATIROMER 8.4 G: 8.4 POWDER, FOR SUSPENSION ORAL at 22:49

## 2019-01-27 RX ADMIN — OXYCODONE HYDROCHLORIDE 10 MG: 10 TABLET ORAL at 15:50

## 2019-01-27 RX ADMIN — IPRATROPIUM BROMIDE 2 PUFF: 17 AEROSOL, METERED RESPIRATORY (INHALATION) at 01:03

## 2019-01-27 ASSESSMENT — ENCOUNTER SYMPTOMS
CHILLS: 0
BACK PAIN: 0
DEPRESSION: 0
DIZZINESS: 0
ORTHOPNEA: 0
ABDOMINAL PAIN: 0
NERVOUS/ANXIOUS: 0
FOCAL WEAKNESS: 0
BLURRED VISION: 0
EYE PAIN: 0
COUGH: 0
FEVER: 0
HEARTBURN: 0
MYALGIAS: 0
WEIGHT LOSS: 0
SPUTUM PRODUCTION: 0
HEADACHES: 0

## 2019-01-27 NOTE — PROGRESS NOTES
Bedside report received, assumed pt care @4154. Pt A&Ox4. Pt c/o constant chronic pain in back and leg; medicated pt per mar. POC discussed, pt verbalized understanding. Bed in lowest position with bed alarm on. Call light within reach

## 2019-01-27 NOTE — CARE PLAN
Problem: Safety  Goal: Will remain free from falls  Outcome: PROGRESSING SLOWER THAN EXPECTED  Bed locked in lowest position and call light is within reach; patient calls appropriately.

## 2019-01-27 NOTE — PROGRESS NOTES
Hospital Medicine Daily Progress Note    Date of Service  1/27/2019    Chief Complaint  68 y.o. female admitted 1/22/2019 with RLE pain    Hospital Course    69 y/o F with PMH of COPD, HTN, hypothyroid came in with above and found to have hyperkalemia with K 7.3.      Interval Problem Update  explained to her in detail about her potassium level and and additional test result. No acute event overnight. Denies chest pain, palpitation. Will consult nephrology today.    Consultants/Specialty  none    Code Status  full    Disposition  Remain on the floor    Review of Systems  Review of Systems   Constitutional: Negative for chills, fever and weight loss.   HENT: Negative for congestion and nosebleeds.    Eyes: Negative for blurred vision and pain.   Respiratory: Negative for cough and sputum production.    Cardiovascular: Negative for chest pain and orthopnea.   Gastrointestinal: Negative for abdominal pain and heartburn.   Genitourinary: Negative for dysuria and frequency.   Musculoskeletal: Positive for joint pain. Negative for back pain and myalgias.   Skin: Negative for rash.   Neurological: Negative for dizziness, focal weakness and headaches.   Psychiatric/Behavioral: Negative for depression. The patient is not nervous/anxious.         Physical Exam  Temp:  [36.1 °C (97 °F)-36.8 °C (98.2 °F)] 36.3 °C (97.4 °F)  Pulse:  [60-72] 61  Resp:  [16-19] 17  BP: (128-155)/(46-66) 129/46  SpO2:  [93 %-96 %] 95 %    Physical Exam   Constitutional: She is oriented to person, place, and time. No distress.   HENT:   Head: Normocephalic.   Eyes: Pupils are equal, round, and reactive to light. EOM are normal.   Neck: Normal range of motion. Neck supple. No thyromegaly present.   Cardiovascular: Normal rate and regular rhythm.    Pulmonary/Chest: Effort normal. No respiratory distress.   Abdominal: Soft. Bowel sounds are normal. She exhibits no distension. There is no tenderness.   Musculoskeletal: She exhibits tenderness. She  exhibits no edema.   Right sciatic pain   Neurological: She is alert and oriented to person, place, and time.   Skin: Skin is warm and dry. She is not diaphoretic. No erythema.   Psychiatric: She has a normal mood and affect. Thought content normal.       Fluids  No intake or output data in the 24 hours ending 01/27/19 0718    Laboratory      Recent Labs      01/25/19   0040  01/26/19   0724   SODIUM  126*  132*   POTASSIUM  5.8*  5.9*   CHLORIDE  97  100   CO2  26  26   GLUCOSE  142*  140*   BUN  59*  62*   CREATININE  1.88*  1.81*   CALCIUM  9.6  9.5                   Imaging  CT-LSPINE W/O PLUS RECONS   Final Result      1.  Moderate multilevel degenerative change of lumbar spine.   2.  No acute fracture or subluxation.   3.  Diffuse osteopenia.      CT-PELVIS W/O PLUS RECONS   Final Result      1.  No hip or pelvic fracture.   2.  Symmetric mild to moderate degenerative change of both hips.           Assessment/Plan  * Hyperkalemia- (present on admission)   Assessment & Plan    Likely related to lisinopril vs CKD  Chronically elevated  Holding lisinopril  Monitor on tele  Aldosterone: normal  Hyperkalemic protocol with calcium gluconate, insulin/dextrose  K 5.9 this am, worsening  Consult nephrology today  Follow bmp closely         COPD (chronic obstructive pulmonary disease) ([facility])- (present on admission)   Assessment & Plan    Without current exacerbation, not on home oxygen therapy, continue current medical regimen.  Monitor.      Class 2 obesity due to excess calories without serious comorbidity with body mass index (BMI) of 36.0 to 36.9 in adult   Assessment & Plan    Body mass index is 36.61 kg/m².       Macrocytic anemia   Assessment & Plan    stable       HLD (hyperlipidemia)- (present on admission)   Assessment & Plan    On statin therapy      Type 2 diabetes mellitus with hyperglycemia, with long-term current use of insulin ([facility])- (present on admission)   Assessment & Plan    Start basal and prandial  insulin dosing, monitor.       Chronic pain syndrome- (present on admission)   Assessment & Plan    Complaint about right sciatic pain  CT lumbar no acute finding  Continue home oxycodone 10 mg q4-6 hrs as needed  flexaril  PT/OT       Hypothyroidism- (present on admission)   Assessment & Plan    ON replacement therapy      Chronic kidney disease, stage 3 ([facility])- (present on admission)   Assessment & Plan    Stage III/IV  Cr stable at baseline 1.8  Follow cmp in am     Essential hypertension- (present on admission)   Assessment & Plan    Controlled with current medication regimen, hold lisinopril in the setting of significant hyperkalemia           VTE prophylaxis: heparin

## 2019-01-28 LAB
ANION GAP SERPL CALC-SCNC: 6 MMOL/L (ref 0–11.9)
BUN SERPL-MCNC: 59 MG/DL (ref 8–22)
CALCIUM SERPL-MCNC: 9.3 MG/DL (ref 8.5–10.5)
CHLORIDE SERPL-SCNC: 101 MMOL/L (ref 96–112)
CO2 SERPL-SCNC: 23 MMOL/L (ref 20–33)
CREAT SERPL-MCNC: 1.76 MG/DL (ref 0.5–1.4)
GLUCOSE BLD-MCNC: 105 MG/DL (ref 65–99)
GLUCOSE BLD-MCNC: 110 MG/DL (ref 65–99)
GLUCOSE BLD-MCNC: 126 MG/DL (ref 65–99)
GLUCOSE SERPL-MCNC: 117 MG/DL (ref 65–99)
POTASSIUM SERPL-SCNC: 5.3 MMOL/L (ref 3.6–5.5)
SODIUM SERPL-SCNC: 130 MMOL/L (ref 135–145)

## 2019-01-28 PROCEDURE — 700111 HCHG RX REV CODE 636 W/ 250 OVERRIDE (IP): Performed by: HOSPITALIST

## 2019-01-28 PROCEDURE — 36415 COLL VENOUS BLD VENIPUNCTURE: CPT

## 2019-01-28 PROCEDURE — 99232 SBSQ HOSP IP/OBS MODERATE 35: CPT | Performed by: INTERNAL MEDICINE

## 2019-01-28 PROCEDURE — 700102 HCHG RX REV CODE 250 W/ 637 OVERRIDE(OP): Performed by: INTERNAL MEDICINE

## 2019-01-28 PROCEDURE — A9270 NON-COVERED ITEM OR SERVICE: HCPCS | Performed by: INTERNAL MEDICINE

## 2019-01-28 PROCEDURE — 770020 HCHG ROOM/CARE - TELE (206)

## 2019-01-28 PROCEDURE — A9270 NON-COVERED ITEM OR SERVICE: HCPCS | Performed by: HOSPITALIST

## 2019-01-28 PROCEDURE — 82962 GLUCOSE BLOOD TEST: CPT | Mod: 91

## 2019-01-28 PROCEDURE — 80048 BASIC METABOLIC PNL TOTAL CA: CPT

## 2019-01-28 PROCEDURE — 700102 HCHG RX REV CODE 250 W/ 637 OVERRIDE(OP): Performed by: HOSPITALIST

## 2019-01-28 RX ADMIN — NICOTINE 14 MG: 14 PATCH, EXTENDED RELEASE TRANSDERMAL at 06:02

## 2019-01-28 RX ADMIN — BUDESONIDE AND FORMOTEROL FUMARATE DIHYDRATE 2 PUFF: 160; 4.5 AEROSOL RESPIRATORY (INHALATION) at 06:04

## 2019-01-28 RX ADMIN — LEVOTHYROXINE SODIUM 75 MCG: 75 TABLET ORAL at 06:02

## 2019-01-28 RX ADMIN — INSULIN GLARGINE 10 UNITS: 100 INJECTION, SOLUTION SUBCUTANEOUS at 17:15

## 2019-01-28 RX ADMIN — Medication 2 TABLET: at 17:14

## 2019-01-28 RX ADMIN — OXYCODONE HYDROCHLORIDE 10 MG: 10 TABLET ORAL at 01:51

## 2019-01-28 RX ADMIN — IPRATROPIUM BROMIDE 2 PUFF: 17 AEROSOL, METERED RESPIRATORY (INHALATION) at 06:04

## 2019-01-28 RX ADMIN — DULOXETINE HYDROCHLORIDE 60 MG: 60 CAPSULE, DELAYED RELEASE ORAL at 06:02

## 2019-01-28 RX ADMIN — IPRATROPIUM BROMIDE 2 PUFF: 17 AEROSOL, METERED RESPIRATORY (INHALATION) at 17:15

## 2019-01-28 RX ADMIN — ACETAMINOPHEN 650 MG: 325 TABLET, FILM COATED ORAL at 01:51

## 2019-01-28 RX ADMIN — HEPARIN SODIUM 5000 UNITS: 5000 INJECTION, SOLUTION INTRAVENOUS; SUBCUTANEOUS at 16:41

## 2019-01-28 RX ADMIN — OXYCODONE HYDROCHLORIDE 10 MG: 10 TABLET ORAL at 06:00

## 2019-01-28 RX ADMIN — AMLODIPINE BESYLATE 10 MG: 10 TABLET ORAL at 06:02

## 2019-01-28 RX ADMIN — PATIROMER 8.4 G: 8.4 POWDER, FOR SUSPENSION ORAL at 10:57

## 2019-01-28 RX ADMIN — CYCLOBENZAPRINE 5 MG: 10 TABLET, FILM COATED ORAL at 20:41

## 2019-01-28 RX ADMIN — AMITRIPTYLINE HYDROCHLORIDE 50 MG: 50 TABLET, FILM COATED ORAL at 01:51

## 2019-01-28 RX ADMIN — OXYCODONE HYDROCHLORIDE 10 MG: 10 TABLET ORAL at 10:57

## 2019-01-28 RX ADMIN — Medication 2 TABLET: at 06:02

## 2019-01-28 RX ADMIN — CYCLOBENZAPRINE 5 MG: 10 TABLET, FILM COATED ORAL at 11:05

## 2019-01-28 RX ADMIN — OXYCODONE HYDROCHLORIDE 10 MG: 10 TABLET ORAL at 16:41

## 2019-01-28 RX ADMIN — IPRATROPIUM BROMIDE 2 PUFF: 17 AEROSOL, METERED RESPIRATORY (INHALATION) at 12:14

## 2019-01-28 RX ADMIN — IPRATROPIUM BROMIDE 2 PUFF: 17 AEROSOL, METERED RESPIRATORY (INHALATION) at 00:41

## 2019-01-28 RX ADMIN — BUDESONIDE AND FORMOTEROL FUMARATE DIHYDRATE 2 PUFF: 160; 4.5 AEROSOL RESPIRATORY (INHALATION) at 17:15

## 2019-01-28 RX ADMIN — OXYCODONE HYDROCHLORIDE 10 MG: 10 TABLET ORAL at 20:41

## 2019-01-28 RX ADMIN — ROSUVASTATIN CALCIUM 10 MG: 20 TABLET, FILM COATED ORAL at 17:14

## 2019-01-28 ASSESSMENT — ENCOUNTER SYMPTOMS
PHOTOPHOBIA: 0
ABDOMINAL PAIN: 0
NAUSEA: 0
ORTHOPNEA: 0
CHILLS: 0
HEADACHES: 0
VOMITING: 0
BACK PAIN: 1
NERVOUS/ANXIOUS: 0
DIZZINESS: 0
FEVER: 0
DEPRESSION: 0
HEARTBURN: 0
FOCAL WEAKNESS: 0
EYE PAIN: 0
BLURRED VISION: 0
COUGH: 0
SPUTUM PRODUCTION: 0
MYALGIAS: 0
WEIGHT LOSS: 0
BACK PAIN: 0

## 2019-01-28 NOTE — PROGRESS NOTES
Dr Campa notified pt K 5.9. No new orders received at this time. Per MD, Nephrology consulted today.

## 2019-01-28 NOTE — CARE PLAN
Problem: Venous Thromboembolism (VTW)/Deep Vein Thrombosis (DVT) Prevention:  Goal: Patient will participate in Venous Thrombosis (VTE)/Deep Vein Thrombosis (DVT)Prevention Measures  Outcome: PROGRESSING SLOWER THAN EXPECTED      Problem: Fluid Volume:  Goal: Will maintain balanced intake and output  Outcome: PROGRESSING AS EXPECTED      Problem: Pain Management  Goal: Pain level will decrease to patient's comfort goal  Outcome: PROGRESSING SLOWER THAN EXPECTED

## 2019-01-28 NOTE — PROGRESS NOTES
Nephrology Daily Progress Note    Date of Service  1/28/2019    Chief Complaint  68 y.o. female admitted 1/22/2019 with CKD IV and hyperkalemia    Interval Problem Update  Started on veltessa, K improved to 5.3, main complaint is sciatica pain    Review of Systems  Review of Systems   Constitutional: Negative for chills and fever.   Musculoskeletal: Positive for back pain.        Leg pain   All other systems reviewed and are negative.       Physical Exam  Temp:  [36 °C (96.8 °F)-36.3 °C (97.3 °F)] 36.2 °C (97.1 °F)  Pulse:  [57-73] 73  Resp:  [16-18] 18  BP: (130-176)/(42-71) 130/57  SpO2:  [93 %-100 %] 100 %    Physical Exam   Constitutional: She is oriented to person, place, and time. She appears well-developed and well-nourished.   HENT:   Head: Normocephalic and atraumatic.   Cardiovascular: Normal rate and regular rhythm.    Pulmonary/Chest: Effort normal and breath sounds normal.   Musculoskeletal: She exhibits no edema or tenderness.   Neurological: She is alert and oriented to person, place, and time.   Skin: Skin is warm and dry.       Fluids    Intake/Output Summary (Last 24 hours) at 01/28/19 0943  Last data filed at 01/27/19 2250   Gross per 24 hour   Intake              320 ml   Output                0 ml   Net              320 ml       Laboratory      Recent Labs      01/26/19   0724  01/27/19   0819  01/28/19   0745   SODIUM  132*  131*  130*   POTASSIUM  5.9*  5.9*  5.3   CHLORIDE  100  102  101   CO2  26  23  23   GLUCOSE  140*  165*  117*   BUN  62*  61*  59*   CREATININE  1.81*  1.78*  1.76*   CALCIUM  9.5  9.2  9.3                   Imaging  CT-LSPINE W/O PLUS RECONS   Final Result      1.  Moderate multilevel degenerative change of lumbar spine.   2.  No acute fracture or subluxation.   3.  Diffuse osteopenia.      CT-PELVIS W/O PLUS RECONS   Final Result      1.  No hip or pelvic fracture.   2.  Symmetric mild to moderate degenerative change of both hips.           Assessment/Plan    1. CKD IV  - Cr stable at 1.76, sees nephrology via telemedicine  2. Hyperkalemia - from CKD/Diet  3. Sciatica     -Would continue veltessa as an outpaient 8.4gm daily  -Would consider add low dose lasix 10mg qD to start to help with K removal  -As an outpatient would restart ACEi if K controlled, main purpose of veltessa would be to allow for the addition of the ACEi again for the CKD  -Will sign off for now, follow up in nephrology clinic, please call with any issues

## 2019-01-28 NOTE — PROGRESS NOTES
Assumed care at 1900, bedside report received from day shift RN. Pt is SR 71 bpm on the monitor. Initial assessment completed, orders reviewed, call light within reach, bed alarm in use, and hourly rounding in place. POC addressed with patient, no additional questions at this time.

## 2019-01-28 NOTE — PROGRESS NOTES
Hospital Medicine Daily Progress Note    Date of Service  1/28/2019    Chief Complaint  68 y.o. female admitted 1/22/2019 with RLE pain    Hospital Course    67 y/o F with PMH of COPD, HTN, hypothyroid came in with above and found to have hyperkalemia with K 7.3.      Interval Problem Update  No acute event overnight. Denies palpitation, dizziness. Explained to her that she will need to stay here for a few more days for her hyperkalemia per nephro recommendation.    Consultants/Specialty  none    Code Status  full    Disposition  Remain on the floor    Review of Systems  Review of Systems   Constitutional: Negative for chills, fever and weight loss.   HENT: Negative for congestion and nosebleeds.    Eyes: Negative for blurred vision, photophobia and pain.   Respiratory: Negative for cough and sputum production.    Cardiovascular: Negative for chest pain and orthopnea.   Gastrointestinal: Negative for abdominal pain, heartburn, nausea and vomiting.   Genitourinary: Negative for dysuria, frequency and urgency.   Musculoskeletal: Positive for joint pain. Negative for back pain and myalgias.   Skin: Negative for itching and rash.   Neurological: Negative for dizziness, focal weakness and headaches.   Psychiatric/Behavioral: Negative for depression. The patient is not nervous/anxious.         Physical Exam  Temp:  [36 °C (96.8 °F)-36.4 °C (97.6 °F)] 36 °C (96.8 °F)  Pulse:  [57-66] 57  Resp:  [16-18] 16  BP: (130-176)/(42-71) 155/42  SpO2:  [93 %-99 %] 99 %    Physical Exam   Constitutional: She is oriented to person, place, and time. No distress.   HENT:   Head: Normocephalic and atraumatic.   Eyes: Pupils are equal, round, and reactive to light. EOM are normal.   Neck: Normal range of motion. Neck supple. No thyromegaly present.   Cardiovascular: Normal rate and regular rhythm.    Pulmonary/Chest: Effort normal and breath sounds normal. No respiratory distress.   Abdominal: Soft. Bowel sounds are normal. She exhibits  no distension. There is no tenderness. There is no rebound.   Musculoskeletal: She exhibits tenderness. She exhibits no edema.   Right sciatic pain   Neurological: She is alert and oriented to person, place, and time. No cranial nerve deficit.   Skin: Skin is warm and dry. She is not diaphoretic. No erythema.   Psychiatric: She has a normal mood and affect. Thought content normal.       Fluids    Intake/Output Summary (Last 24 hours) at 01/28/19 0837  Last data filed at 01/27/19 2250   Gross per 24 hour   Intake              320 ml   Output                0 ml   Net              320 ml       Laboratory      Recent Labs      01/26/19   0724  01/27/19   0819  01/28/19   0745   SODIUM  132*  131*  130*   POTASSIUM  5.9*  5.9*  5.3   CHLORIDE  100  102  101   CO2  26  23  23   GLUCOSE  140*  165*  117*   BUN  62*  61*  59*   CREATININE  1.81*  1.78*  1.76*   CALCIUM  9.5  9.2  9.3                   Imaging  CT-LSPINE W/O PLUS RECONS   Final Result      1.  Moderate multilevel degenerative change of lumbar spine.   2.  No acute fracture or subluxation.   3.  Diffuse osteopenia.      CT-PELVIS W/O PLUS RECONS   Final Result      1.  No hip or pelvic fracture.   2.  Symmetric mild to moderate degenerative change of both hips.           Assessment/Plan  * Hyperkalemia- (present on admission)   Assessment & Plan    Likely related to CKD  Chronically elevated  Holding lisinopril  Monitor on tele  Aldosterone: normal  Hyperkalemic protocol with calcium gluconate, insulin/dextrose  K 5.3 this am, improving  Started on valtessa yeseterday  nephro following  Follow bmp closely         COPD (chronic obstructive pulmonary disease) (HCC)- (present on admission)   Assessment & Plan    Without current exacerbation, not on home oxygen therapy, continue current medical regimen.  Monitor.      Class 2 obesity due to excess calories without serious comorbidity with body mass index (BMI) of 36.0 to 36.9 in adult   Assessment & Plan    Body  mass index is 36.61 kg/m².       Macrocytic anemia   Assessment & Plan    stable       HLD (hyperlipidemia)- (present on admission)   Assessment & Plan    On statin therapy      Type 2 diabetes mellitus with hyperglycemia, with long-term current use of insulin (HCC)- (present on admission)   Assessment & Plan    Start basal and prandial insulin dosing, monitor.       Chronic pain syndrome- (present on admission)   Assessment & Plan    Complaint about right sciatic pain  CT lumbar no acute finding  Continue home oxycodone 10 mg q4-6 hrs as needed  flexaril  PT/OT       Hypothyroidism- (present on admission)   Assessment & Plan    ON replacement therapy      Chronic kidney disease, stage 3 (HCC)- (present on admission)   Assessment & Plan    Stage III/IV  Cr stable at baseline 1.7  Follow cmp in am     Essential hypertension- (present on admission)   Assessment & Plan    Controlled with current medication regimen, hold lisinopril in the setting of significant hyperkalemia           VTE prophylaxis: heparin

## 2019-01-28 NOTE — CONSULTS
DATE OF SERVICE:  01/27/2019    NEPHROLOGY CONSULTATION    REQUESTING PHYSICIAN:  Eric Campa MD    REASON FOR CONSULTATION:  To evaluate patient with chronic kidney disease   stage IV and chronic persistent hyperkalemia.    HISTORY OF PRESENT ILLNESS:  The patient is a 68-year-old female with past   medical history of chronic kidney disease stage IV with baseline creatinine   level at 1.8-1.9, also hypertension, diabetes mellitus type 2, chronic   obstructive pulmonary disease, who was admitted to the hospital on 01/22/2019   with complaints of right lower extremity pain due to mechanical fall.  Also,   at that time found to be significantly volume overloaded, treated with Lasix.    Patient states that she lost over 20 pounds.  Upon admission, her creatinine   level was 1.68 and potassium was 7.1.  Over hospital course, we stopped ACE   inhibitor, treated with Lasix, potassium improved to 5.1.  Once stopped Lasix,   it is back to 5.9.  Creatinine is now at 1.78.  Currently, patient is doing   well, has no complaints.  Denies exact sciatica pain on the right lower   extremity.  Denies headache.  No dizziness.  No chest pain.  Mild dyspnea.    Patient is on chronic oxygen.  No difficulties to urinate.  No edema.    REVIEW OF SYSTEMS:  GENERAL:  Positive for fatigue.  No fever or chills.  HEENT:  No congestion.  No nosebleeds.  No sore throat.  NECK:  No pain, no stiffness.  RESPIRATORY:  No wheezes, no rhonchi.  CARDIOVASCULAR:  No edema, no chest pain, no palpitations.  GASTROINTESTINAL:  No abdominal pain.  No nausea, vomiting, diarrhea.  GENITOURINARY:  No dysuria, hematuria, flank pain.  MUSCULOSKELETAL:  Positive for right sciatica pain.    All other systems reviewed, all negative.    PAST MEDICAL HISTORY:  Positive for chronic kidney disease stage III with   progression to stage IV, congestive heart failure, arthritis, gastroesophageal   reflux disease, hepatitis C, hypertension, neuropathy, sleep  apnea.    PAST SURGICAL HISTORY:  Orthopedic surgeries, breast biopsy, lumpectomy, GYN   surgery.    SOCIAL HISTORY:  Positive for tobacco 1 pack per day.  No alcohol or drug use.    FAMILY HISTORY:  Positive for lung disease, heart disease, diabetes mellitus   type 2, stroke.    ALLERGIES:  ALLERGIC TO VITAMIN C, KEFLEX, CODEINE, GABAPENTIN, LYRICA, AND   SUDAFED.    OUTPATIENT/INPATIENT MEDICATIONS:  Reviewed.    PHYSICAL EXAMINATION:  VITAL SIGNS:  Blood pressure 130/54, heart rate 64, temperature is 36.4   Celsius.  GENERAL APPEARANCE:  Well-developed, well-nourished female, in no acute   distress.  HEENT:  Normocephalic and atraumatic.  Pupils equal, round, reactive to light.    Extraocular movements intact.  Oropharynx is clear, moist mucosa, no   erythema or exudate.  NECK:  Supple.  No lymphadenopathy.  No thyromegaly appreciated.  LUNGS:  Clear to auscultation bilaterally.  No wheezes, rales, or rhonchi.  HEART:  Regular rate.  No rub or gallop.  ABDOMEN:  Soft, nontender, and nondistended.  Bowel sounds present.  No   palpable masses.  No hepatosplenomegaly.  EXTREMITIES:  No cyanosis, no clubbing, no edema.  NEUROLOGIC:  Alert and oriented x3.  No focal deficits.    LABORATORY RESULTS:  Hemoglobin level 10.8.  Sodium 131, potassium 5.9, BUN 61   and creatinine 1.78.    ASSESSMENT AND PLAN:  The patient is a 68-year-old female with chronic kidney   disease stage III with progression to stage IV, chronic hyperkalemia first   diagnosed almost 2 years ago.  Also, with hypertension and diabetes mellitus   type 2.  1.  Chronic kidney disease stage IV followup.  Monitor kidney function   closely.  Patient to continue followup in nephrology clinic.  2.  Electrolytes, hyperkalemia, chronic.  To provide low potassium diet.    Also, we will add Veltassa 8.4 g daily.  Consider small dose of Lasix.  3.  Hypertension.  Blood pressure remains well controlled.  4.  Anemia.  Hemoglobin level stable.  5.  Volume, well  controlled.    RECOMMENDATIONS:  As mentioned above, low potassium diet, Veltassa 8.4 g   daily.  To monitor potassium.  Follow up in nephrology clinic.  We will follow   patient closely.    Thank you for the consult.       ____________________________________     MD BERLIN CASTAÑEDA / DMITRY    DD:  01/27/2019 16:55:57  DT:  01/27/2019 17:29:48    D#:  [id number]  Job#:  [id number]

## 2019-01-29 LAB
GLUCOSE BLD-MCNC: 103 MG/DL (ref 65–99)
GLUCOSE BLD-MCNC: 139 MG/DL (ref 65–99)
GLUCOSE BLD-MCNC: 142 MG/DL (ref 65–99)
GLUCOSE BLD-MCNC: 144 MG/DL (ref 65–99)
GLUCOSE BLD-MCNC: 148 MG/DL (ref 65–99)

## 2019-01-29 PROCEDURE — A9270 NON-COVERED ITEM OR SERVICE: HCPCS | Performed by: HOSPITALIST

## 2019-01-29 PROCEDURE — 82962 GLUCOSE BLOOD TEST: CPT | Mod: 91

## 2019-01-29 PROCEDURE — 97116 GAIT TRAINING THERAPY: CPT

## 2019-01-29 PROCEDURE — 770020 HCHG ROOM/CARE - TELE (206)

## 2019-01-29 PROCEDURE — A9270 NON-COVERED ITEM OR SERVICE: HCPCS | Performed by: INTERNAL MEDICINE

## 2019-01-29 PROCEDURE — 700102 HCHG RX REV CODE 250 W/ 637 OVERRIDE(OP): Performed by: HOSPITALIST

## 2019-01-29 PROCEDURE — 97535 SELF CARE MNGMENT TRAINING: CPT

## 2019-01-29 PROCEDURE — 97530 THERAPEUTIC ACTIVITIES: CPT

## 2019-01-29 PROCEDURE — 700102 HCHG RX REV CODE 250 W/ 637 OVERRIDE(OP): Performed by: INTERNAL MEDICINE

## 2019-01-29 PROCEDURE — 99233 SBSQ HOSP IP/OBS HIGH 50: CPT | Performed by: HOSPITALIST

## 2019-01-29 RX ADMIN — CYCLOBENZAPRINE 5 MG: 10 TABLET, FILM COATED ORAL at 06:18

## 2019-01-29 RX ADMIN — IPRATROPIUM BROMIDE 2 PUFF: 17 AEROSOL, METERED RESPIRATORY (INHALATION) at 23:26

## 2019-01-29 RX ADMIN — OXYCODONE HYDROCHLORIDE 10 MG: 10 TABLET ORAL at 17:12

## 2019-01-29 RX ADMIN — INSULIN GLARGINE 10 UNITS: 100 INJECTION, SOLUTION SUBCUTANEOUS at 17:13

## 2019-01-29 RX ADMIN — PATIROMER 8.4 G: 8.4 POWDER, FOR SUSPENSION ORAL at 03:29

## 2019-01-29 RX ADMIN — OXYCODONE HYDROCHLORIDE 10 MG: 10 TABLET ORAL at 06:17

## 2019-01-29 RX ADMIN — OXYCODONE HYDROCHLORIDE 10 MG: 10 TABLET ORAL at 11:29

## 2019-01-29 RX ADMIN — DULOXETINE HYDROCHLORIDE 60 MG: 60 CAPSULE, DELAYED RELEASE ORAL at 06:17

## 2019-01-29 RX ADMIN — CYCLOBENZAPRINE 5 MG: 10 TABLET, FILM COATED ORAL at 20:19

## 2019-01-29 RX ADMIN — OXYCODONE HYDROCHLORIDE 10 MG: 10 TABLET ORAL at 22:19

## 2019-01-29 RX ADMIN — IPRATROPIUM BROMIDE 2 PUFF: 17 AEROSOL, METERED RESPIRATORY (INHALATION) at 00:47

## 2019-01-29 RX ADMIN — ROSUVASTATIN CALCIUM 10 MG: 20 TABLET, FILM COATED ORAL at 17:11

## 2019-01-29 RX ADMIN — IPRATROPIUM BROMIDE 2 PUFF: 17 AEROSOL, METERED RESPIRATORY (INHALATION) at 17:11

## 2019-01-29 RX ADMIN — BUDESONIDE AND FORMOTEROL FUMARATE DIHYDRATE 2 PUFF: 160; 4.5 AEROSOL RESPIRATORY (INHALATION) at 17:11

## 2019-01-29 RX ADMIN — ACETAMINOPHEN 650 MG: 325 TABLET, FILM COATED ORAL at 23:26

## 2019-01-29 RX ADMIN — NICOTINE 14 MG: 14 PATCH, EXTENDED RELEASE TRANSDERMAL at 06:15

## 2019-01-29 RX ADMIN — Medication 2 TABLET: at 06:17

## 2019-01-29 RX ADMIN — BUDESONIDE AND FORMOTEROL FUMARATE DIHYDRATE 2 PUFF: 160; 4.5 AEROSOL RESPIRATORY (INHALATION) at 06:19

## 2019-01-29 RX ADMIN — IPRATROPIUM BROMIDE 2 PUFF: 17 AEROSOL, METERED RESPIRATORY (INHALATION) at 11:29

## 2019-01-29 RX ADMIN — OXYCODONE HYDROCHLORIDE 10 MG: 10 TABLET ORAL at 00:44

## 2019-01-29 RX ADMIN — LEVOTHYROXINE SODIUM 75 MCG: 75 TABLET ORAL at 06:17

## 2019-01-29 RX ADMIN — AMLODIPINE BESYLATE 10 MG: 10 TABLET ORAL at 06:17

## 2019-01-29 RX ADMIN — CYCLOBENZAPRINE 5 MG: 10 TABLET, FILM COATED ORAL at 23:27

## 2019-01-29 RX ADMIN — IPRATROPIUM BROMIDE 2 PUFF: 17 AEROSOL, METERED RESPIRATORY (INHALATION) at 06:16

## 2019-01-29 ASSESSMENT — COGNITIVE AND FUNCTIONAL STATUS - GENERAL
MOBILITY SCORE: 20
HELP NEEDED FOR BATHING: A LITTLE
TURNING FROM BACK TO SIDE WHILE IN FLAT BAD: A LITTLE
SUGGESTED CMS G CODE MODIFIER DAILY ACTIVITY: CJ
SUGGESTED CMS G CODE MODIFIER MOBILITY: CJ
PERSONAL GROOMING: A LITTLE
MOVING TO AND FROM BED TO CHAIR: A LITTLE
MOVING FROM LYING ON BACK TO SITTING ON SIDE OF FLAT BED: A LITTLE
CLIMB 3 TO 5 STEPS WITH RAILING: A LITTLE
DAILY ACTIVITIY SCORE: 22

## 2019-01-29 ASSESSMENT — ENCOUNTER SYMPTOMS
DIZZINESS: 0
FEVER: 0
NAUSEA: 0
BLURRED VISION: 0
MYALGIAS: 0
FOCAL WEAKNESS: 0
COUGH: 0
PALPITATIONS: 0
WEAKNESS: 1
HEARTBURN: 0
HEMOPTYSIS: 0
SINUS PAIN: 0
DEPRESSION: 0

## 2019-01-29 ASSESSMENT — GAIT ASSESSMENTS
GAIT LEVEL OF ASSIST: STAND BY ASSIST
ASSISTIVE DEVICE: FRONT WHEEL WALKER
DEVIATION: ANTALGIC;BRADYKINETIC
DISTANCE (FEET): 150

## 2019-01-29 NOTE — CARE PLAN
Problem: Infection  Goal: Will remain free from infection  Outcome: PROGRESSING AS EXPECTED  Pt educated on the importance of hand washing to reduce the risk of infection. Pt verbally understands and performs hand hygiene to reduce to risks of infection.     Problem: Bowel/Gastric:  Goal: Will not experience complications related to bowel motility  Outcome: PROGRESSING AS EXPECTED  Pt educated on the use of stool softeners to aide in bowel maintenance and the importance of regular bowel movements     Problem: Fluid Volume:  Goal: Will maintain balanced intake and output  Outcome: PROGRESSING AS EXPECTED  Pt consuming % of meals throughout the course to the day with proper amounts of water to maintain hydration.

## 2019-01-29 NOTE — PROGRESS NOTES
Hospital Medicine Daily Progress Note    Date of Service  1/29/2019    Chief Complaint  68 y.o. female admitted 1/22/2019 with RLE pain    Hospital Course    69 y/o F with PMH of COPD, HTN, hypothyroid came in with above and found to have hyperkalemia with K 7.3.      Interval Problem Update  Patient is resting in chair, continued to feel weak, she is at baseline oxygen requirement, she is tolerating diet, she is asking for information regarding low potassium diet, discussed with nephrology regarding plan of care, home health care order placed and I have discussed with .  Patient denies any nausea vomiting fever chills.    Consultants/Specialty  none    Code Status  full    Disposition  Home with home health care    Review of Systems  Review of Systems   Constitutional: Negative for fever.   HENT: Negative for congestion and sinus pain.    Eyes: Negative for blurred vision.   Respiratory: Negative for cough and hemoptysis.    Cardiovascular: Negative for chest pain and palpitations.   Gastrointestinal: Negative for heartburn and nausea.   Genitourinary: Negative for dysuria and frequency.   Musculoskeletal: Negative for joint pain and myalgias.   Skin: Negative for itching.   Neurological: Positive for weakness. Negative for dizziness and focal weakness.   Psychiatric/Behavioral: Negative for depression and suicidal ideas.        Physical Exam  Temp:  [36 °C (96.8 °F)-36.6 °C (97.8 °F)] 36.3 °C (97.4 °F)  Pulse:  [60-74] 64  Resp:  [16-18] 18  BP: (106-159)/(45-60) 132/60  SpO2:  [94 %-98 %] 95 %    Physical Exam   Constitutional: She is oriented to person, place, and time. She appears well-nourished. No distress.   HENT:   Head: Normocephalic and atraumatic.   Mouth/Throat: No oropharyngeal exudate.   Eyes: Conjunctivae are normal. No scleral icterus.   Neck: Normal range of motion. Neck supple.   Cardiovascular: Normal rate and regular rhythm.  Exam reveals no friction rub.    Pulmonary/Chest: Effort  normal and breath sounds normal. No respiratory distress. She has no wheezes.   Abdominal: Soft. Bowel sounds are normal. She exhibits no distension. There is no rebound.   Musculoskeletal: She exhibits tenderness (Improved). She exhibits no edema.   Right sciatic pain stable.   Lymphadenopathy:     She has no cervical adenopathy.   Neurological: She is alert and oriented to person, place, and time.   Skin: Skin is warm and dry.   Psychiatric: She has a normal mood and affect.       Fluids    Intake/Output Summary (Last 24 hours) at 01/29/19 1559  Last data filed at 01/29/19 0918   Gross per 24 hour   Intake                0 ml   Output              200 ml   Net             -200 ml       Laboratory      Recent Labs      01/27/19   0819  01/28/19   0745   SODIUM  131*  130*   POTASSIUM  5.9*  5.3   CHLORIDE  102  101   CO2  23  23   GLUCOSE  165*  117*   BUN  61*  59*   CREATININE  1.78*  1.76*   CALCIUM  9.2  9.3                   Imaging  CT-LSPINE W/O PLUS RECONS   Final Result      1.  Moderate multilevel degenerative change of lumbar spine.   2.  No acute fracture or subluxation.   3.  Diffuse osteopenia.      CT-PELVIS W/O PLUS RECONS   Final Result      1.  No hip or pelvic fracture.   2.  Symmetric mild to moderate degenerative change of both hips.           Assessment/Plan  * Hyperkalemia- (present on admission)   Assessment & Plan    Likely related to chronic kidney disease and lisinopril, discussed with nephrology and recommended to continue on veltessa, I have sent prescription to patient's pharmacy and discussed with  to check for insurance coverage, if insurance does not cover copayment is too high nephrology is recommending to start Lasix 10 mg twice daily.         COPD (chronic obstructive pulmonary disease) (HCC)- (present on admission)   Assessment & Plan    Without current exacerbation, not on home oxygen therapy, continue respiratory per protocol.     Class 2 obesity due to excess  calories without serious comorbidity with body mass index (BMI) of 36.0 to 36.9 in adult   Assessment & Plan    Body mass index is 36.61 kg/m².  Needs to lose weight     Macrocytic anemia   Assessment & Plan    Stable.       HLD (hyperlipidemia)- (present on admission)   Assessment & Plan    On statin therapy.     Type 2 diabetes mellitus with hyperglycemia, with long-term current use of insulin (HCC)- (present on admission)   Assessment & Plan    Continue Lantus 10 units and sliding scale insulin     Chronic pain syndrome- (present on admission)   Assessment & Plan    Complaint about right sciatic pain  CT lumbar no acute finding  Continue home oxycodone 10 mg q4-6 hrs as needed flexaril  PT/OT: Recommending home health care with PT OT.       Hypothyroidism- (present on admission)   Assessment & Plan    Continue supplement     Chronic kidney disease, stage 3 (HCC)- (present on admission)   Assessment & Plan    Stage III/IV  Creatinine at baseline, I have reordered BMP for today but still pending.       Essential hypertension- (present on admission)   Assessment & Plan    Continue holding lisinopril due to hyperkalemia, continue amlodipine          VTE prophylaxis: heparin

## 2019-01-29 NOTE — PROGRESS NOTES
Spoke with pt who stats that she would rather take her petiromer not in the morning. Pt expressed that it interferes with her pain medication schedule making it hard to move around while she is awake due to pain. Spoke with Pt and pharmacy and medication time was adjusted to 0300.

## 2019-01-29 NOTE — CARE PLAN
Problem: Safety  Goal: Will remain free from falls  Outcome: PROGRESSING AS EXPECTED  Pt will not have a fall. Pt refused bed alarm, pt agrees that she will ring every time prior to ambulation     Problem: Venous Thromboembolism (VTW)/Deep Vein Thrombosis (DVT) Prevention:  Goal: Patient will participate in Venous Thrombosis (VTE)/Deep Vein Thrombosis (DVT)Prevention Measures   01/28/19 2030   Mechanical/VTE Prophylaxis   Mechanical Prophylaxis  SCDs, Sequential Compression Device   SCDs, Sequential Compression Device Refused   OTHER   VTE RISK Moderate   Pharmacologic Prophylaxis Used Unfractionated Heparin  (refused this evening )   Pt will agree to Prophylaxis this shift

## 2019-01-29 NOTE — THERAPY
"Occupational Therapy Treatment completed with focus on ADLs, ADL transfers and patient education.  Functional Status:  Pt was seen for Occupational Therapy treatment today, see Therapy Kardex for details.Treatment included education in breath control with activity and at rest, self pacing techs and energy conservation for pain management. Educated pt in safety awareness techs as well. Psychosocial intervention addressed. Pt c/o sciatica nerve pain level 6-7 but agreed to attempt self care tasks. Pt demo supervised for all bed mobility. Pt demonstrated Supervision  for UB dressing, supervision  for LB dressing, pants and shoe donning seated EOB post set up. Pt stood with supervision only, ambulated to BR safely with FWW able to manipulate long O2 tubing safely with walking. Supervision for toilet transfers using grab bar and FWW which pt stated she has at home. Discussed seated showers. Pt has all DME at home per pt. Pt did state she \"is fearful of falling in the shower so I only shower 1X a week\". Pt stood at sink for oral hygiene and grooming demo fatigue with this activity. Pt stated she \"sits at home to do all grooming\". Pt stated she had friends to do grocery shopping but cooks herself using the microwave. Pt will need assist with house keeping. SW informed. RN informed of pt requesting pain meds then looking at the clock, stated, \"Oh, I'm not due yet\".  Pt also demonstrated Supervision /SBA with fatigue for Ambulating ADL's with FWW. Pt has met short  term OT goals. OTR informed.  Pt was left up in chair at the bedside, call light in reach, bedside table in front of her and nursing is aware. RN/MOHAMUD/CNA updated on OT treatment findings and recommendations.  Continue Occupational Therapy services as per plan.    Plan of Care: Will benefit from Occupational Therapy 2 times per week  Discharge Recommendations:  Equipment Will Continue to Assess for Equipment Needs. Post-acute therapy Discharge to home with " "outpatient or home health for additional skilled therapy services.    See \"Rehab Therapy-Acute\" Patient Summary Report for complete documentation.   "

## 2019-01-29 NOTE — FACE TO FACE
Face to Face Supporting Documentation - Home Health    The encounter with this patient was in whole or in part the primary reason for home health admission.    Date of encounter:   Patient:                    MRN:                       YOB: 2020  Priya Callahan  [id number]  1950     Home health to see patient for:  Skilled Nursing care for assessment, interventions & education    Skilled need for:  Comment: weak needs PT.    Skilled nursing interventions to include:  Comment: needs PT.    Homebound status evidenced by:  Needs the assistance of another person in order to leave the home. Leaving home requires a considerable and taxing effort. There is a normal inability to leave the home.    Community Physician to provide follow up care: Kavitha Mccall M.D.     Optional Interventions? No      I certify the face to face encounter for this home health care referral meets the CMS requirements and the encounter/clinical assessment with the patient was, in whole, or in part, for the medical condition(s) listed above, which is the primary reason for home health care. Based on my clinical findings: the service(s) are medically necessary, support the need for home health care, and the homebound criteria are met.  I certify that this patient has had a face to face encounter by myself.  Lukas Payne M.D. - NPI: [id number]

## 2019-01-30 ENCOUNTER — PATIENT OUTREACH (OUTPATIENT)
Dept: HEALTH INFORMATION MANAGEMENT | Facility: OTHER | Age: 69
End: 2019-01-30

## 2019-01-30 VITALS
WEIGHT: 225.75 LBS | SYSTOLIC BLOOD PRESSURE: 132 MMHG | RESPIRATION RATE: 16 BRPM | BODY MASS INDEX: 37.61 KG/M2 | DIASTOLIC BLOOD PRESSURE: 64 MMHG | OXYGEN SATURATION: 97 % | HEART RATE: 68 BPM | TEMPERATURE: 97 F | HEIGHT: 65 IN

## 2019-01-30 PROBLEM — E87.5 HYPERKALEMIA: Status: RESOLVED | Noted: 2019-01-22 | Resolved: 2019-01-30

## 2019-01-30 LAB
ANION GAP SERPL CALC-SCNC: 8 MMOL/L (ref 0–11.9)
BUN SERPL-MCNC: 54 MG/DL (ref 8–22)
CALCIUM SERPL-MCNC: 9.3 MG/DL (ref 8.5–10.5)
CHLORIDE SERPL-SCNC: 102 MMOL/L (ref 96–112)
CO2 SERPL-SCNC: 25 MMOL/L (ref 20–33)
CREAT SERPL-MCNC: 1.61 MG/DL (ref 0.5–1.4)
GLUCOSE BLD-MCNC: 128 MG/DL (ref 65–99)
GLUCOSE BLD-MCNC: 233 MG/DL (ref 65–99)
GLUCOSE SERPL-MCNC: 113 MG/DL (ref 65–99)
POTASSIUM SERPL-SCNC: 5.1 MMOL/L (ref 3.6–5.5)
SODIUM SERPL-SCNC: 135 MMOL/L (ref 135–145)

## 2019-01-30 PROCEDURE — A9270 NON-COVERED ITEM OR SERVICE: HCPCS | Performed by: HOSPITALIST

## 2019-01-30 PROCEDURE — 36415 COLL VENOUS BLD VENIPUNCTURE: CPT

## 2019-01-30 PROCEDURE — 99239 HOSP IP/OBS DSCHRG MGMT >30: CPT | Performed by: INTERNAL MEDICINE

## 2019-01-30 PROCEDURE — A9270 NON-COVERED ITEM OR SERVICE: HCPCS | Performed by: INTERNAL MEDICINE

## 2019-01-30 PROCEDURE — 82962 GLUCOSE BLOOD TEST: CPT | Mod: 91

## 2019-01-30 PROCEDURE — 700102 HCHG RX REV CODE 250 W/ 637 OVERRIDE(OP): Performed by: INTERNAL MEDICINE

## 2019-01-30 PROCEDURE — 700102 HCHG RX REV CODE 250 W/ 637 OVERRIDE(OP): Performed by: HOSPITALIST

## 2019-01-30 PROCEDURE — 80048 BASIC METABOLIC PNL TOTAL CA: CPT

## 2019-01-30 RX ORDER — AMLODIPINE BESYLATE 10 MG/1
10 TABLET ORAL DAILY
Qty: 30 TAB | Refills: 0 | Status: SHIPPED | OUTPATIENT
Start: 2019-01-31 | End: 2019-02-28 | Stop reason: SDUPTHER

## 2019-01-30 RX ADMIN — LEVOTHYROXINE SODIUM 75 MCG: 75 TABLET ORAL at 06:05

## 2019-01-30 RX ADMIN — NICOTINE 14 MG: 14 PATCH, EXTENDED RELEASE TRANSDERMAL at 06:05

## 2019-01-30 RX ADMIN — PATIROMER 8.4 G: 8.4 POWDER, FOR SUSPENSION ORAL at 03:06

## 2019-01-30 RX ADMIN — IPRATROPIUM BROMIDE 2 PUFF: 17 AEROSOL, METERED RESPIRATORY (INHALATION) at 06:05

## 2019-01-30 RX ADMIN — OXYCODONE HYDROCHLORIDE 10 MG: 10 TABLET ORAL at 10:44

## 2019-01-30 RX ADMIN — IPRATROPIUM BROMIDE 2 PUFF: 17 AEROSOL, METERED RESPIRATORY (INHALATION) at 10:54

## 2019-01-30 RX ADMIN — OXYCODONE HYDROCHLORIDE 10 MG: 10 TABLET ORAL at 06:05

## 2019-01-30 RX ADMIN — OXYCODONE HYDROCHLORIDE 10 MG: 10 TABLET ORAL at 15:58

## 2019-01-30 RX ADMIN — INSULIN LISPRO 2 UNITS: 100 INJECTION, SOLUTION INTRAVENOUS; SUBCUTANEOUS at 11:09

## 2019-01-30 RX ADMIN — DULOXETINE HYDROCHLORIDE 60 MG: 60 CAPSULE, DELAYED RELEASE ORAL at 06:05

## 2019-01-30 RX ADMIN — CYCLOBENZAPRINE 5 MG: 10 TABLET, FILM COATED ORAL at 10:54

## 2019-01-30 RX ADMIN — BUDESONIDE AND FORMOTEROL FUMARATE DIHYDRATE 2 PUFF: 160; 4.5 AEROSOL RESPIRATORY (INHALATION) at 06:05

## 2019-01-30 RX ADMIN — AMLODIPINE BESYLATE 10 MG: 10 TABLET ORAL at 06:05

## 2019-01-30 NOTE — DISCHARGE PLANNING
Agency/Facility Name: [facility]  Spoke To: Shannon  Outcome: Patient declined, insufficient staffing in patients home area.

## 2019-01-30 NOTE — THERAPY
"Physical Therapy Treatment completed.   Bed Mobility:  Supine to Sit: Supervised  Transfers: Sit to Stand: Supervised  Gait: Level Of Assist: Stand by Assist with Front-Wheel Walker       Plan of Care: Will benefit from Physical Therapy 4 times per week  Discharge Recommendations: Equipment: No Equipment Needed. Post-acute therapy Recommend outpatient or home health transitional care services for continued physical therapy services.      See \"Rehab Therapy-Acute\" Patient Summary Report for complete documentation.       "

## 2019-01-30 NOTE — DISCHARGE PLANNING
Anticipated Discharge Disposition: D/C to Home with HH    Action: LSW contacted pt's pharmacy and confirmed her Amy does not require a prior auth and has an $8.50 co-pay. Pt's medication was ordered and will be at the pharmacy tomorrow, 1/30/19. Pt will be discharging with HH.    Barriers to Discharge: HH choice, HH acceptance.    Plan: LSW to obtain HH choice morning of 1/30.

## 2019-01-30 NOTE — DISCHARGE PLANNING
Received Choice form at 1325  Agency/Facility Name: [facility]  Referral sent per Choice form at 1333

## 2019-01-30 NOTE — DISCHARGE PLANNING
Received Choice form at 0937  Agency/Facility Name: [facility]  Referral sent per Choice form at [phone]

## 2019-01-30 NOTE — DISCHARGE PLANNING
Anticipated Discharge Disposition: D/C to Home with HH    Action: LSW met with pt at bedside to discuss d/c planning and referrals. Pt does not want to go to SNF and has been recommended by PT/OT to HH or OP services. LSW obtained HH choice from pt, pt's choice is [facility] as she has been on service with them before.     Pt would like additional assistance in her home (home health aide). LSW agreed to give her a list of paid care giving, group homes, and also assisted living facilities.     Pt states she will arrange a ride once she knows when he d/c is taking place.     Barriers to Discharge: HH acceptance.     Plan: LSW to notify RN once HH has accepted.

## 2019-01-30 NOTE — PROGRESS NOTES
"Report received. Pt care assumed. Assessment performed. Pt AOx4. Pt laying supine in bed. Pt c/o 8/10 \"sciatica\" pain and no signs of distress. Medicating per MAR. Bed in low, locked position. Bed alarm on. Call light within reach. Treaded socks on pt.  Hourly rounding in place.  "

## 2019-01-30 NOTE — CARE PLAN
Problem: Safety  Goal: Will remain free from falls  Outcome: PROGRESSING AS EXPECTED  Safety measures in place and pt educated     Problem: Pain Management  Goal: Pain level will decrease to patient's comfort goal  Outcome: PROGRESSING SLOWER THAN EXPECTED  Pt will report pain control

## 2019-01-30 NOTE — DISCHARGE PLANNING
Anticipated Discharge Disposition: D/C to Home with HH    Action: Per LSW conversation with pt, she is open to sending to any company able to accept her for services. Pt's first choice was [facility], however, [facility] is full and unable to accept pt at this time. Referral sent to [facility] as this is the only other company it pt's service area.     LSW contacted [facility] and spoke with Lucila. It was confirmed pt has been accepted for services. Anticipate pt will d/c today.    Barriers to Discharge: None.    Plan: No further d/c planning needs known at this time.

## 2019-01-31 ENCOUNTER — PATIENT OUTREACH (OUTPATIENT)
Dept: HEALTH INFORMATION MANAGEMENT | Facility: OTHER | Age: 69
End: 2019-01-31

## 2019-01-31 RX ORDER — TRAZODONE HYDROCHLORIDE 50 MG/1
150 TABLET ORAL
COMMUNITY
End: 2019-06-03

## 2019-01-31 RX ORDER — CALCIPOTRIENE 50 UG/G
1-2 CREAM TOPICAL
Refills: 11 | COMMUNITY
Start: 2019-01-09 | End: 2019-02-08 | Stop reason: CLARIF

## 2019-01-31 ASSESSMENT — PATIENT HEALTH QUESTIONNAIRE - PHQ9
CLINICAL INTERPRETATION OF PHQ2 SCORE: 4
SUM OF ALL RESPONSES TO PHQ QUESTIONS 1-9: 15
5. POOR APPETITE OR OVEREATING: 2 - MORE THAN HALF THE DAYS

## 2019-01-31 ASSESSMENT — ENCOUNTER SYMPTOMS
DEPRESSION: 1
OCCASIONAL FEELINGS OF UNSTEADINESS: 1
LOSS OF SENSATION IN FEET: 1

## 2019-01-31 NOTE — DISCHARGE SUMMARY
Discharge Summary    CHIEF COMPLAINT ON ADMISSION  Chief Complaint   Patient presents with   • Leg Pain     Right leg pain s/p GLF       Reason for Admission  Right lower extremity pain    Admission Date  1/22/2019    CODE STATUS  Prior    HPI & HOSPITAL COURSE  This is a 68 y.o. female with past medical history of COPD not on home oxygen, essential hypertension and hypothyroidism presented to the hospital on January 22, 2019 with complaint of right lower extremity pain.  She found to have hyperkalemia and she was started on Lasix and Mckeon catheter was placed. Lisinopril was stopped due to hyperkalemia and she received hyperkalemia protocol with IV dextrose, insulin and calcium gluconate.  Nephrology was consulted and patient was started on with Veltassa 8.4g daily and her potassium started to trending down and nephrology recommended to continue with medication and follow-up with outpatient.  Patient home health care was arranged and she was discharged home with home health.  On the day of discharge she reported chronic right lower extremity pain other than that she denies any acute complaints on my evaluation at the bedside.  Later I received page from RN that at the time of discharge when she was about to sit in a car she had a fall and she did not hit her head.  She denies any acute complaints after fall and RN offered to transfer her to ER but she decided to go home.  After the fall she denies any loss of consciousness and any new acute pain.  She was recommended to go to ER if she develops any new symptoms or other acute complaints.  She expressed understanding.  She is to follow-up with nephrology and primary care provider as outpatient for close monitoring.             Therefore, she is discharged in fair and stable condition to home with close outpatient follow-up.    The patient met 2-midnight criteria for an inpatient stay at the time of discharge.    Discharge Date  1/30/2019    FOLLOW UP ITEMS POST  DISCHARGE  Primary care provider  Nephrology    DISCHARGE DIAGNOSES  Principal Problem (Resolved):    Hyperkalemia POA: Yes  Active Problems:    COPD (chronic obstructive pulmonary disease) (HCC) (Chronic) POA: Yes      Overview: On oxygen 3L nocturnal    Essential hypertension POA: Yes    Chronic kidney disease, stage 3 (HCC) POA: Yes    Hypothyroidism (Chronic) POA: Yes    Chronic pain syndrome (Chronic) POA: Yes    Type 2 diabetes mellitus with hyperglycemia, with long-term current use of insulin (HCC) POA: Yes    HLD (hyperlipidemia) (Chronic) POA: Yes    Macrocytic anemia POA: Unknown    Class 2 obesity due to excess calories without serious comorbidity with body mass index (BMI) of 36.0 to 36.9 in adult POA: Unknown      FOLLOW UP  Future Appointments  Date Time Provider Department Center   2/4/2019 3:00 PM MARLO Vazquez   4/4/2019 1:00 PM MARLO Huang [facility]         Go to          Go in 5 days        MEDICATIONS ON DISCHARGE     Medication List      START taking these medications      Instructions   amLODIPine 10 MG Tabs  Start taking on:  1/31/2019  Commonly known as:  NORVASC   Take 1 Tab by mouth every day.  Dose:  10 mg     patiromer 8.4 g Pack  Commonly known as:  VELTASSA   Take 8.4 g by mouth every day.  Dose:  8.4 g        CONTINUE taking these medications      Instructions   amitriptyline 50 MG Tabs  Commonly known as:  ELAVIL   Take 1 Tab by mouth at bedtime as needed for Sleep.  Dose:  50 mg     ANTIOXIDANTS PO   Take  by mouth.     benzonatate 100 MG Caps  Commonly known as:  TESSALON   Take 1 Cap by mouth 3 times a day as needed for Cough.  Dose:  100 mg     clobetasol 0.05 % Crea  Commonly known as:  TEMOVATE   Use a thin film to affected skin twice a day as needed.     clotrimazole-betamethasone 1-0.05 % Crea  Commonly known as:  LOTRISONE   Apply 1 g to affected area(s) 2 times a day.  Dose:  1 g     cyclobenzaprine 5 MG tablet  Commonly known  "as:  FLEXERIL   Take 1-2 Tabs by mouth 3 times a day as needed.  Dose:  5-10 mg     Diclofenac Sodium 1 % Gel   Apply  to skin as directed.     DULoxetine 60 MG Cpep delayed-release capsule  Commonly known as:  CYMBALTA   TAKE 1 CAPSULE BY MOUTH DAILY     ipratropium 17 MCG/ACT Aers  Commonly known as:  ATROVENT   Doctor's comments:  Instead of spiriva  Inhale 2 Puffs by mouth every 6 hours.  Dose:  2 Puff     lactulose 10 GM/15ML Soln   TAKE 30 MLS (6 TEASPOONFULS) BY MOUTH TWO TIMES DAILY     LANTUS SOLOSTAR 100 UNIT/ML Sopn injection  Generic drug:  insulin glargine   INJECT 5-20 UNITS SUBCUTANEOUSLY EVERY EVENING     levothyroxine 75 MCG Tabs  Commonly known as:  SYNTHROID   TAKE 1 TABLET BY MOUTH DAILY     nystatin/triamcinolone 087771-5.1 UNIT/GM-% Crea  Commonly known as:  MYCOLOG   APPLY A THIN LAYER TO FUNGAL RASH ONCE DAILY AS NEEDED     oxyCODONE immediate release 10 MG immediate release tablet  Start taking on:  3/10/2019  Commonly known as:  ROXICODONE   Take 1 Tab by mouth every four hours as needed for Moderate Pain for up to 30 days.  Dose:  10 mg     rosuvastatin 10 MG Tabs  Commonly known as:  CRESTOR   Take 1 Tab by mouth every evening.  Dose:  10 mg     SYMBICORT 160-4.5 MCG/ACT Aero  Generic drug:  budesonide-formoterol   INHALE 2 PUFFS BY MOUTH TWO TIMES DAILY     varenicline 1 MG tablet  Commonly known as:  CHANTIX CONTINUING MONTH LUANN   Take 1 Tab by mouth 2 times a day.  Dose:  1 mg        STOP taking these medications    lisinopril 40 MG tablet  Commonly known as:  PRINIVIL, ZESTRIL     MethylPREDNISolone 4 MG Tbpk  Commonly known as:  MEDROL DOSEPAK            Allergies  Allergies   Allergen Reactions   • Vitamin C Diarrhea     \"violent diarrhea\"   • Keflex Rash     Severe rash, but TOLERATES PENICILLINS, Rocephin    • Codeine Nausea     Severe stomach pain   • Gabapentin Palpitations     Gets shaky and falls    • Lyrica Nausea     Nausea, dizzy   • Sudafed Nausea and Unspecified     Chest " pain, nausea       DIET  No orders of the defined types were placed in this encounter.      ACTIVITY  As tolerated.  Weight bearing as tolerated    CONSULTATIONS  Nephrology    PROCEDURES  None    LABORATORY  Lab Results   Component Value Date    SODIUM 135 01/30/2019    POTASSIUM 5.1 01/30/2019    CHLORIDE 102 01/30/2019    CO2 25 01/30/2019    GLUCOSE 113 (H) 01/30/2019    BUN 54 (H) 01/30/2019    CREATININE 1.61 (H) 01/30/2019        Lab Results   Component Value Date    WBC 13.2 (H) 01/23/2019    HEMOGLOBIN 10.8 (L) 01/23/2019    HEMATOCRIT 33.5 (L) 01/23/2019    PLATELETCT 232 01/23/2019        Total time of the discharge process exceeds 38 minutes.

## 2019-01-31 NOTE — DISCHARGE PLANNING
Agency/Facility Name: [facility]  Spoke To: Fax Transmission  Outcome: Patient accepted.

## 2019-01-31 NOTE — PROGRESS NOTES
Outbound call to Priya to complete post discharge medication review. Reviewed indication, dose, and timing of each medication. Reconciled med list in EPIC.     Patient reports smoking a little with recent stressors. Encouraged patient to contact -800-QUIT-NOW for additional support with smoking cessation.     Patient reports not using Atrovent regularly because she does not like the taste. She is unsure why this replaced the Spiriva but states she stopped the Spiriva because she did not think she needed 2 inhalers (she is currently taking Symbicort). Explained that these medications work differently to help control COPD. Patient is interested in trying Trelegy as this may help improve adherence and decrease cost as it is only dosed once daily and would be one co-payment. Will send message to provider. Patient is also on 3.5 L of continuous oxygen.     Patient reports falling yesterday after leaving hospital. She denies hitting her head or having LOC. Patient states she was attempting to get into the car when she fell and the nurse was able to prevent her from hitting her head. Reports some muscle soreness. High fall risk based on fall risk assessment. Refer to fall risk assessment [employer] hyperlink. Reviewed fall safety measures. Patient has cane, walker, and wheelchair which she reports using one of these at all times.      · Both amitriptyline and cyclobenzaprine are identified on the Beers criteria as potentially inappropriate medications to be avoided in patients 65 years and older due to strong anticholinergic properties. Patient is amenable to trying alternative muscle relaxant such as baclofen. She states amitriptyline was added to trazodone because she continued to have trouble sleeping. Will send message to provider.     Completed PHQ-9 Depression Screening with score of 15. Please refer to [employer] hyperlink. Patient states she lost her son 7 years ago. She had been to counseling in the past but  did not feel it was beneficial. Denies suicidal ideations. Declined referral to CC SW at this time. Will send message to provider.     See completed medication review pharmacy follow-up [facility] hyperlink for additional documentation.     Sonia Streeter, PharmD    CC: Christina Agee, APRN

## 2019-01-31 NOTE — PROGRESS NOTES
"Pt discharged to home with ride share. Pt escorted down by SHITAL Blackmon in a wheelchair. Discharge teaching performed. Questions answered as needed. Discharge paperwork, prescriptions, and belongings home with pt. While assisting pt into vehicle, pt states her \"leg gave out because of her sciatica\" pt had an assisted fall to the ground. This RN caught her head. Pt did not lose consciousness, denies any new c/o pain, pt denies hitting her head. Paged Dr. Lewis to notify of the situation. Offered multiple times to take pt to the ED, pt declined to go to the ED. Pt states he \"son will be at her house when she gets home to help her.\" Notified pt if she has any concern to go immediately to the ED.  "

## 2019-01-31 NOTE — DISCHARGE INSTRUCTIONS
Discharge Instructions    Discharged to home by taxi with self. Discharged via wheelchair, hospital escort: Yes.  Special equipment needed: Oxygen    Be sure to schedule a follow-up appointment with your primary care doctor or any specialists as instructed.     Discharge Plan:   Diet Plan: Discussed  Activity Level: Discussed  Confirmed Follow up Appointment: Appointment Scheduled  Confirmed Symptoms Management: Discussed  Medication Reconciliation Updated: Yes  Influenza Vaccine Indication: Not indicated: Previously immunized this influenza season and > 8 years of age    I understand that a diet low in cholesterol, fat, and sodium is recommended for good health. Unless I have been given specific instructions below for another diet, I accept this instruction as my diet prescription.   Other diet: Renal diabetic    Special Instructions: None    · Is patient discharged on Warfarin / Coumadin?   No     Depression / Suicide Risk    [facility], it is important to learn how to keep safe from harming yourself.    Recognize the warning signs:  · Abrupt changes in personality, positive or negative- including increase in energy   · Giving away possessions  · Change in eating patterns- significant weight changes-  positive or negative  · Change in sleeping patterns- unable to sleep or sleeping all the time   · Unwillingness or inability to communicate  · Depression  · Unusual sadness, discouragement and loneliness  · Talk of wanting to die  · Neglect of personal appearance   · Rebelliousness- reckless behavior  · Withdrawal from people/activities they love  · Confusion- inability to concentrate     If you or a loved one observes any of these behaviors or has concerns about self-harm, here's what you can do:  · Talk about it- your feelings and reasons for harming yourself  · Remove any means that you might use to hurt yourself (examples: pills, rope, extension cords, firearm)  · Get  professional help from the community (Mental Health, Substance Abuse, psychological counseling)  · Do not be alone:Call your Safe Contact- someone whom you trust who will be there for you.  · Call your local CRISIS HOTLINE 446-3639 or 422-300-8134  · Call your local Children's Mobile Crisis Response Team [place] (713) 508-5567 or www.[employer]Fin  · Call the toll free National Suicide Prevention Hotlines   · National Suicide Prevention Lifeline 303-025-YAUR (4188)  · National Hope Line Network 800-SUICIDE (569-8876)  Fall Prevention in the Home  Falls can cause injuries and can affect people from all age groups. There are many simple things that you can do to make your home safe and to help prevent falls.  What can I do on the outside of my home?  · Regularly repair the edges of walkways and driveways and fix any cracks.  · Remove high doorway thresholds.  · Trim any shrubbery on the main path into your home.  · Use bright outdoor lighting.  · Clear walkways of debris and clutter, including tools and rocks.  · Regularly check that handrails are securely fastened and in good repair. Both sides of any steps should have handrails.  · Install guardrails along the edges of any raised decks or porches.  · Have leaves, snow, and ice cleared regularly.  · Use sand or salt on walkways during winter months.  · In the garage, clean up any spills right away, including grease or oil spills.  What can I do in the bathroom?  · Use night lights.  · Install grab bars by the toilet and in the tub and shower. Do not use towel bars as grab bars.  · Use non-skid mats or decals on the floor of the tub or shower.  · If you need to sit down while you are in the shower, use a plastic, non-slip stool.  · Keep the floor dry. Immediately clean up any water that spills on the floor.  · Remove soap buildup in the tub or shower on a regular basis.  · Attach bath mats securely with double-sided non-slip rug tape.  · Remove throw rugs and  other tripping hazards from the floor.  What can I do in the bedroom?  · Use night lights.  · Make sure that a bedside light is easy to reach.  · Do not use oversized bedding that drapes onto the floor.  · Have a firm chair that has side arms to use for getting dressed.  · Remove throw rugs and other tripping hazards from the floor.  What can I do in the kitchen?  · Clean up any spills right away.  · Avoid walking on wet floors.  · Place frequently used items in easy-to-reach places.  · If you need to reach for something above you, use a sturdy step stool that has a grab bar.  · Keep electrical cables out of the way.  · Do not use floor polish or wax that makes floors slippery. If you have to use wax, make sure that it is non-skid floor wax.  · Remove throw rugs and other tripping hazards from the floor.  What can I do in the stairways?  · Do not leave any items on the stairs.  · Make sure that there are handrails on both sides of the stairs. Fix handrails that are broken or loose. Make sure that handrails are as long as the stairways.  · Check any carpeting to make sure that it is firmly attached to the stairs. Fix any carpet that is loose or worn.  · Avoid having throw rugs at the top or bottom of stairways, or secure the rugs with carpet tape to prevent them from moving.  · Make sure that you have a light switch at the top of the stairs and the bottom of the stairs. If you do not have them, have them installed.  What are some other fall prevention tips?  · Wear closed-toe shoes that fit well and support your feet. Wear shoes that have rubber soles or low heels.  · When you use a stepladder, make sure that it is completely opened and that the sides are firmly locked. Have someone hold the ladder while you are using it. Do not climb a closed stepladder.  · Add color or contrast paint or tape to grab bars and handrails in your home. Place contrasting color strips on the first and last steps.  · Use mobility aids as  needed, such as canes, walkers, scooters, and crutches.  · Turn on lights if it is dark. Replace any light bulbs that burn out.  · Set up furniture so that there are clear paths. Keep the furniture in the same spot.  · Fix any uneven floor surfaces.  · Choose a carpet design that does not hide the edge of steps of a stairway.  · Be aware of any and all pets.  · Review your medicines with your healthcare provider. Some medicines can cause dizziness or changes in blood pressure, which increase your risk of falling.  Talk with your health care provider about other ways that you can decrease your risk of falls. This may include working with a physical therapist or  to improve your strength, balance, and endurance.  This information is not intended to replace advice given to you by your health care provider. Make sure you discuss any questions you have with your health care provider.  Document Released: 12/08/2003 Document Revised: 05/16/2017 Document Reviewed: 01/22/2016  [employer] Patient Education © 2017 [employer]    Hyperkalemia  Introduction  Hyperkalemia is when you have too much potassium in your blood. Potassium is normally removed (excreted) from your body by your kidneys. If there is too much potassium in your blood, it can affect your heart’s ability to function.  What are the causes?  Hyperkalemia may be caused by:  · Taking in too much potassium. You can do this by:  ¨ Using salt substitutes. They contain large amounts of potassium.  ¨ Taking potassium supplements.  ¨ Eating foods high in potassium.  · Excreting too little potassium. This can happen if:  ¨ Your kidneys are not working properly. Kidney (renal) disease, including short- or long-term renal failure, is a very common cause of hyperkalemia.  ¨ You are taking medicines that lower your excretion of potassium.  ¨ You have [place] disease.  ¨ You have a urinary tract blockage, such as kidney stones.  ¨ You are on treatment to  mechanically clean your blood (dialysis) and you skip a treatment.  · Releasing a high amount of potassium from your cells into your blood. This can happen with:  ¨ Injury to muscles (rhabdomyolysis) or other tissues. Most potassium is stored in your muscles.  ¨ Severe burns or infections.  ¨ Acidic blood plasma (acidosis). Acidosis can result from many diseases, such as uncontrolled diabetes.  What increases the risk?  The most common risk factor of hyperkalemia is kidney disease. Other risk factors of hyperkalemia include:  · [place] disease. This is a condition where your glands do not produce enough hormones.  · Alcoholism or heavy drug use.  · Using certain blood pressure medicines, such as angiotensin-converting enzyme (ACE) inhibitors, angiotensin II receptor blockers (ARBs), or potassium-sparing diuretics such as spironolactone.  · Severe injury or burn.  What are the signs or symptoms?  Oftentimes, there are no signs or symptoms of hyperkalemia. However, when your potassium level becomes high enough, you may experience symptoms such as:  · Irregular or very slow heartbeat.  · Nausea.  · Fatigue.  · Tingling of the skin or numbness of the hands or feet.  · Muscle weakness.  · Fatigue.  · Not being able to move (paralysis).  You may not have any symptoms of hyperkalemia.  How is this diagnosed?  Hyperkalemia may be diagnosed by:  · Physical exam.  · Blood tests.  · ECG (electrocardiogram).  · Discussion of prescription and non-prescription drug use.  How is this treated?  Treatment for hyperkalemia is often directed at the underlying cause. In some instances, treatment may include:  · Insulin.  · Glucose (sugar) and water solution given through a vein (intravenous or IV ).  · Dialysis.  · Medicines to remove the potassium from your body.  · Medicines to move calcium from your bloodstream into your tissues.  Follow these instructions at home:  · Take medicines only as directed by your health care  provider.  · Do not take any supplements, natural products, herbs, or vitamins without reviewing them with your health care provider. Certain supplements and natural food products can have high amounts of potassium.  · Limit your alcohol intake as directed by your health care provider.  · Stop illegal drug use. If you need help quitting, ask your health care provider.  · Keep all follow-up visits as directed by your health care provider. This is important.  · If you have kidney disease, you may need to follow a low potassium diet. A dietitian can help educate you on low potassium foods.  Contact a health care provider if:  · You notice an irregular or very slow heartbeat.  · You feel light-headed.  · You feel weak.  · You are nauseous.  · You have tingling or numbness in your hands or feet.  Get help right away if:  · You have shortness of breath.  · You have chest pain or discomfort.  · You pass out.  · You have muscle paralysis.  This information is not intended to replace advice given to you by your health care provider. Make sure you discuss any questions you have with your health care provider.  Document Released: 12/08/2003 Document Revised: 05/25/2017 Document Reviewed: 03/25/2015  © 2017 [employer]

## 2019-01-31 NOTE — PROGRESS NOTES
Outbound call to Priya for post discharge medication review. Patient verified that she picked up Veltassa and is aware to separate oral medications by at least 3 hours before or after. She was not aware of amlodipine RX but she will contact her pharmacy and request delivery. She has stopped lisinopril and medrol dosepak. She was not available for full medication review. Patient requested return call this afternoon to complete full review. No clinically significant interactions noted.     Plan: Will attempt to call patient this afternoon to complete full medication review.     Sonia Streeter, KhoiD

## 2019-02-07 NOTE — ADDENDUM NOTE
Encounter addended by: Eric Campa M.D. on: 2/7/2019  1:01 PM<BR>    Actions taken: Sign clinical note

## 2019-02-07 NOTE — DOCUMENTATION QUERY
[facility]                                                                         Query Response Note      PATIENT:               CHAITANYA CABELLO  ACCT #:                  [id number]  MRN:                       [id number]  :                       1950  ADMIT DATE:       2019 8:22 PM  DISCH DATE:        2019 5:41 PM  RESPONDING  PROVIDER #:        [id number]           RESPONSE TEXT:    I didn't document history of SHF in my documents. It was Dr. Herr who documented it. Please send it to him.    QUERY TEXT:    CHF Acuity and Type [email or web address]    A history of Congestive Heart Failure is documented in the Medical Record. Please document the type and acuity (includes probable or suspected).     NOTE:  If an appropriate response is not listed below, please respond with a new note.    The patient's Clinical Indicators include:  Effort normal and breath sounds normal. No respiratory distress.   No Edema.  BNP 41  CXR: not evaluated   ECHO 3/16/17: EF 60%. Grade II diastolic dysfunction. Estimated RVSP 60 mmHg. No significant valvular heart disease.    Treatment:   Lasix IV 40mg x 2 doses, home Rx for lisinopril, BNP, & daily weight    Risk Factors:   Age, CHF, HTN, CKD 3, DM2 with hyperglycemia, obesity, & COPD  Query created by: Liana Rubalcava on 2019 2:39 PM       RESPONSE TEXT:    Chronic Respiratory Failure    QUERY TEXT:    Respiratory Failure Acuity and Type [email or web address]    The use of home oxygen therapy is documented in the Medical Record. Can a diagnosis be provided to support this finding?     NOTE:  If an appropriate response is not listed below, please respond with a new note.    The patient's Clinical Indicators include:  PMHx of home O2 use  RR 16-24  94-99% on 3-3.5L via N/C    Treatment:   Continuous O2, nicotine patch, Symbicort, & Atrovent inhaler    Risk Factors:   COPD, nicotine dependence, HTN, DM2,  obesity, CKD IV, & chronic pain syndrome  Query created by: Liana Rubalcava on 1/29/2019 12:22 PM        Electronically signed by:  REZA KWON MD 2/7/2019 12:58 PM

## 2019-02-08 ENCOUNTER — APPOINTMENT (OUTPATIENT)
Dept: RADIOLOGY | Facility: MEDICAL CENTER | Age: 69
End: 2019-02-08
Attending: EMERGENCY MEDICINE
Payer: MEDICARE

## 2019-02-08 ENCOUNTER — HOSPITAL ENCOUNTER (OUTPATIENT)
Facility: MEDICAL CENTER | Age: 69
End: 2019-02-14
Attending: EMERGENCY MEDICINE | Admitting: HOSPITALIST
Payer: MEDICARE

## 2019-02-08 DIAGNOSIS — S82.831A OTHER CLOSED FRACTURE OF DISTAL END OF RIGHT FIBULA, INITIAL ENCOUNTER: ICD-10-CM

## 2019-02-08 DIAGNOSIS — R53.1 WEAKNESS: ICD-10-CM

## 2019-02-08 DIAGNOSIS — G89.29 CHRONIC BACK PAIN GREATER THAN 3 MONTHS DURATION: ICD-10-CM

## 2019-02-08 DIAGNOSIS — M54.9 CHRONIC BACK PAIN GREATER THAN 3 MONTHS DURATION: ICD-10-CM
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
## 2019-02-08 LAB
ALBUMIN SERPL BCP-MCNC: 3.3 G/DL (ref 3.2–4.9)
ALBUMIN/GLOB SERPL: 1.1 G/DL
ALP SERPL-CCNC: 42 U/L (ref 30–99)
ALT SERPL-CCNC: 24 U/L (ref 2–50)
ANION GAP SERPL CALC-SCNC: 6 MMOL/L (ref 0–11.9)
APPEARANCE UR: CLEAR
APTT PPP: 34.5 SEC (ref 24.7–36)
AST SERPL-CCNC: 19 U/L (ref 12–45)
BACTERIA #/AREA URNS HPF: NEGATIVE /HPF
BASOPHILS # BLD AUTO: 0.4 % (ref 0–1.8)
BASOPHILS # BLD: 0.04 K/UL (ref 0–0.12)
BILIRUB SERPL-MCNC: 0.3 MG/DL (ref 0.1–1.5)
BILIRUB UR QL STRIP.AUTO: ABNORMAL
BUN SERPL-MCNC: 31 MG/DL (ref 8–22)
CALCIUM SERPL-MCNC: 9.2 MG/DL (ref 8.5–10.5)
CHLORIDE SERPL-SCNC: 105 MMOL/L (ref 96–112)
CO2 SERPL-SCNC: 27 MMOL/L (ref 20–33)
COLOR UR: ABNORMAL
CREAT SERPL-MCNC: 1.86 MG/DL (ref 0.5–1.4)
EOSINOPHIL # BLD AUTO: 0.12 K/UL (ref 0–0.51)
EOSINOPHIL NFR BLD: 1.2 % (ref 0–6.9)
EPI CELLS #/AREA URNS HPF: NEGATIVE /HPF
ERYTHROCYTE [DISTWIDTH] IN BLOOD BY AUTOMATED COUNT: 46.9 FL (ref 35.9–50)
GLOBULIN SER CALC-MCNC: 2.9 G/DL (ref 1.9–3.5)
GLUCOSE BLD-MCNC: 146 MG/DL (ref 65–99)
GLUCOSE BLD-MCNC: 168 MG/DL (ref 65–99)
GLUCOSE SERPL-MCNC: 175 MG/DL (ref 65–99)
GLUCOSE UR STRIP.AUTO-MCNC: NEGATIVE MG/DL
HCT VFR BLD AUTO: 27.9 % (ref 37–47)
HGB BLD-MCNC: 8.8 G/DL (ref 12–16)
HYALINE CASTS #/AREA URNS LPF: ABNORMAL /LPF
IMM GRANULOCYTES # BLD AUTO: 0.06 K/UL (ref 0–0.11)
IMM GRANULOCYTES NFR BLD AUTO: 0.6 % (ref 0–0.9)
INR PPP: 1.09 (ref 0.87–1.13)
KETONES UR STRIP.AUTO-MCNC: ABNORMAL MG/DL
LEUKOCYTE ESTERASE UR QL STRIP.AUTO: NEGATIVE
LYMPHOCYTES # BLD AUTO: 1.08 K/UL (ref 1–4.8)
LYMPHOCYTES NFR BLD: 10.8 % (ref 22–41)
MCH RBC QN AUTO: 32.1 PG (ref 27–33)
MCHC RBC AUTO-ENTMCNC: 31.5 G/DL (ref 33.6–35)
MCV RBC AUTO: 101.8 FL (ref 81.4–97.8)
MICRO URNS: ABNORMAL
MONOCYTES # BLD AUTO: 0.74 K/UL (ref 0–0.85)
MONOCYTES NFR BLD AUTO: 7.4 % (ref 0–13.4)
MUCOUS THREADS #/AREA URNS HPF: ABNORMAL /HPF
NEUTROPHILS # BLD AUTO: 7.94 K/UL (ref 2–7.15)
NEUTROPHILS NFR BLD: 79.6 % (ref 44–72)
NITRITE UR QL STRIP.AUTO: NEGATIVE
NRBC # BLD AUTO: 0 K/UL
NRBC BLD-RTO: 0 /100 WBC
PH UR STRIP.AUTO: 5 [PH]
PLATELET # BLD AUTO: 213 K/UL (ref 164–446)
PMV BLD AUTO: 9 FL (ref 9–12.9)
POTASSIUM SERPL-SCNC: 4.4 MMOL/L (ref 3.6–5.5)
PROT SERPL-MCNC: 6.2 G/DL (ref 6–8.2)
PROT UR QL STRIP: 300 MG/DL
PROTHROMBIN TIME: 14.2 SEC (ref 12–14.6)
RBC # BLD AUTO: 2.74 M/UL (ref 4.2–5.4)
RBC # URNS HPF: ABNORMAL /HPF
RBC UR QL AUTO: NEGATIVE
SODIUM SERPL-SCNC: 138 MMOL/L (ref 135–145)
SP GR UR STRIP.AUTO: 1.02
UROBILINOGEN UR STRIP.AUTO-MCNC: 0.2 MG/DL
WBC # BLD AUTO: 10 K/UL (ref 4.8–10.8)
WBC #/AREA URNS HPF: ABNORMAL /HPF

## 2019-02-08 PROCEDURE — 73630 X-RAY EXAM OF FOOT: CPT | Mod: RT

## 2019-02-08 PROCEDURE — 81001 URINALYSIS AUTO W/SCOPE: CPT

## 2019-02-08 PROCEDURE — 36415 COLL VENOUS BLD VENIPUNCTURE: CPT

## 2019-02-08 PROCEDURE — 700102 HCHG RX REV CODE 250 W/ 637 OVERRIDE(OP): Performed by: HOSPITALIST

## 2019-02-08 PROCEDURE — G0378 HOSPITAL OBSERVATION PER HR: HCPCS

## 2019-02-08 PROCEDURE — 304561 HCHG STAT O2

## 2019-02-08 PROCEDURE — 96372 THER/PROPH/DIAG INJ SC/IM: CPT

## 2019-02-08 PROCEDURE — A9270 NON-COVERED ITEM OR SERVICE: HCPCS | Performed by: HOSPITALIST

## 2019-02-08 PROCEDURE — 99285 EMERGENCY DEPT VISIT HI MDM: CPT

## 2019-02-08 PROCEDURE — 85610 PROTHROMBIN TIME: CPT

## 2019-02-08 PROCEDURE — 80053 COMPREHEN METABOLIC PANEL: CPT

## 2019-02-08 PROCEDURE — 700102 HCHG RX REV CODE 250 W/ 637 OVERRIDE(OP): Performed by: EMERGENCY MEDICINE

## 2019-02-08 PROCEDURE — 82962 GLUCOSE BLOOD TEST: CPT

## 2019-02-08 PROCEDURE — A9270 NON-COVERED ITEM OR SERVICE: HCPCS | Performed by: EMERGENCY MEDICINE

## 2019-02-08 PROCEDURE — 85730 THROMBOPLASTIN TIME PARTIAL: CPT

## 2019-02-08 PROCEDURE — 700111 HCHG RX REV CODE 636 W/ 250 OVERRIDE (IP): Performed by: HOSPITALIST

## 2019-02-08 PROCEDURE — 73610 X-RAY EXAM OF ANKLE: CPT | Mod: RT

## 2019-02-08 PROCEDURE — 99220 PR INITIAL OBSERVATION CARE,LEVL III: CPT | Performed by: HOSPITALIST

## 2019-02-08 PROCEDURE — 85025 COMPLETE CBC W/AUTO DIFF WBC: CPT

## 2019-02-08 RX ORDER — DEXTROSE MONOHYDRATE 25 G/50ML
25 INJECTION, SOLUTION INTRAVENOUS
Status: DISCONTINUED | OUTPATIENT
Start: 2019-02-08 | End: 2019-02-14 | Stop reason: HOSPADM

## 2019-02-08 RX ORDER — VARENICLINE TARTRATE 1 MG/1
1 TABLET, FILM COATED ORAL 2 TIMES DAILY
Status: DISCONTINUED | OUTPATIENT
Start: 2019-02-08 | End: 2019-02-14 | Stop reason: HOSPADM

## 2019-02-08 RX ORDER — INSULIN GLARGINE 100 [IU]/ML
20 INJECTION, SOLUTION SUBCUTANEOUS EVERY EVENING
Status: DISCONTINUED | OUTPATIENT
Start: 2019-02-08 | End: 2019-02-14 | Stop reason: HOSPADM

## 2019-02-08 RX ORDER — CYCLOBENZAPRINE HCL 10 MG
5-10 TABLET ORAL 3 TIMES DAILY PRN
Status: DISCONTINUED | OUTPATIENT
Start: 2019-02-08 | End: 2019-02-14 | Stop reason: HOSPADM

## 2019-02-08 RX ORDER — VARENICLINE TARTRATE 1 MG/1
1 TABLET, FILM COATED ORAL 2 TIMES DAILY
Status: DISCONTINUED | OUTPATIENT
Start: 2019-02-08 | End: 2019-02-08

## 2019-02-08 RX ORDER — AMLODIPINE BESYLATE 10 MG/1
10 TABLET ORAL DAILY
Status: DISCONTINUED | OUTPATIENT
Start: 2019-02-09 | End: 2019-02-14 | Stop reason: HOSPADM

## 2019-02-08 RX ORDER — TRAZODONE HYDROCHLORIDE 50 MG/1
150 TABLET ORAL NIGHTLY
Status: DISCONTINUED | OUTPATIENT
Start: 2019-02-08 | End: 2019-02-14 | Stop reason: HOSPADM

## 2019-02-08 RX ORDER — OXYCODONE HYDROCHLORIDE AND ACETAMINOPHEN 5; 325 MG/1; MG/1
1 TABLET ORAL ONCE
Status: COMPLETED | OUTPATIENT
Start: 2019-02-08 | End: 2019-02-08

## 2019-02-08 RX ORDER — DULOXETIN HYDROCHLORIDE 30 MG/1
60 CAPSULE, DELAYED RELEASE ORAL DAILY
Status: DISCONTINUED | OUTPATIENT
Start: 2019-02-09 | End: 2019-02-14 | Stop reason: HOSPADM

## 2019-02-08 RX ORDER — HYDROMORPHONE HYDROCHLORIDE 1 MG/ML
0.25 INJECTION, SOLUTION INTRAMUSCULAR; INTRAVENOUS; SUBCUTANEOUS
Status: DISCONTINUED | OUTPATIENT
Start: 2019-02-08 | End: 2019-02-14 | Stop reason: HOSPADM

## 2019-02-08 RX ORDER — ONDANSETRON 4 MG/1
4 TABLET, ORALLY DISINTEGRATING ORAL EVERY 4 HOURS PRN
Status: DISCONTINUED | OUTPATIENT
Start: 2019-02-08 | End: 2019-02-14 | Stop reason: HOSPADM

## 2019-02-08 RX ORDER — OXYCODONE HYDROCHLORIDE 5 MG/1
5 TABLET ORAL
Status: DISCONTINUED | OUTPATIENT
Start: 2019-02-08 | End: 2019-02-14 | Stop reason: HOSPADM

## 2019-02-08 RX ORDER — ONDANSETRON 2 MG/ML
4 INJECTION INTRAMUSCULAR; INTRAVENOUS EVERY 4 HOURS PRN
Status: DISCONTINUED | OUTPATIENT
Start: 2019-02-08 | End: 2019-02-14 | Stop reason: HOSPADM

## 2019-02-08 RX ORDER — LEVOTHYROXINE SODIUM 0.07 MG/1
75 TABLET ORAL
Status: DISCONTINUED | OUTPATIENT
Start: 2019-02-09 | End: 2019-02-14 | Stop reason: HOSPADM

## 2019-02-08 RX ORDER — BISACODYL 10 MG
10 SUPPOSITORY, RECTAL RECTAL
Status: DISCONTINUED | OUTPATIENT
Start: 2019-02-08 | End: 2019-02-09

## 2019-02-08 RX ORDER — BUDESONIDE AND FORMOTEROL FUMARATE DIHYDRATE 160; 4.5 UG/1; UG/1
2 AEROSOL RESPIRATORY (INHALATION) 2 TIMES DAILY
Status: DISCONTINUED | OUTPATIENT
Start: 2019-02-08 | End: 2019-02-14 | Stop reason: HOSPADM

## 2019-02-08 RX ORDER — ROSUVASTATIN CALCIUM 10 MG/1
10 TABLET, COATED ORAL EVERY EVENING
Status: DISCONTINUED | OUTPATIENT
Start: 2019-02-08 | End: 2019-02-14 | Stop reason: HOSPADM

## 2019-02-08 RX ORDER — OXYCODONE HYDROCHLORIDE 5 MG/1
2.5 TABLET ORAL
Status: DISCONTINUED | OUTPATIENT
Start: 2019-02-08 | End: 2019-02-14 | Stop reason: HOSPADM

## 2019-02-08 RX ORDER — ACETAMINOPHEN 325 MG/1
650 TABLET ORAL EVERY 6 HOURS PRN
Status: DISCONTINUED | OUTPATIENT
Start: 2019-02-08 | End: 2019-02-14 | Stop reason: HOSPADM

## 2019-02-08 RX ORDER — BUDESONIDE AND FORMOTEROL FUMARATE DIHYDRATE 160; 4.5 UG/1; UG/1
2 AEROSOL RESPIRATORY (INHALATION)
Status: DISCONTINUED | OUTPATIENT
Start: 2019-02-08 | End: 2019-02-08

## 2019-02-08 RX ORDER — POLYETHYLENE GLYCOL 3350 17 G/17G
1 POWDER, FOR SOLUTION ORAL DAILY
Status: DISCONTINUED | OUTPATIENT
Start: 2019-02-09 | End: 2019-02-09

## 2019-02-08 RX ORDER — POLYETHYLENE GLYCOL 3350 17 G/17G
1 POWDER, FOR SOLUTION ORAL
Status: DISCONTINUED | OUTPATIENT
Start: 2019-02-08 | End: 2019-02-09

## 2019-02-08 RX ORDER — AMOXICILLIN 250 MG
2 CAPSULE ORAL 2 TIMES DAILY
Status: DISCONTINUED | OUTPATIENT
Start: 2019-02-08 | End: 2019-02-09

## 2019-02-08 RX ADMIN — VARENICLINE TARTRATE 1 MG: 1 TABLET, FILM COATED ORAL at 18:00

## 2019-02-08 RX ADMIN — ENOXAPARIN SODIUM 40 MG: 100 INJECTION SUBCUTANEOUS at 16:08

## 2019-02-08 RX ADMIN — OXYCODONE AND ACETAMINOPHEN 1 TABLET: 5; 325 TABLET ORAL at 12:57

## 2019-02-08 RX ADMIN — INSULIN HUMAN 2 UNITS: 100 INJECTION, SOLUTION PARENTERAL at 17:59

## 2019-02-08 RX ADMIN — IPRATROPIUM BROMIDE 2 PUFF: 17 AEROSOL, METERED RESPIRATORY (INHALATION) at 18:31

## 2019-02-08 RX ADMIN — TRAZODONE HYDROCHLORIDE 150 MG: 50 TABLET ORAL at 21:32

## 2019-02-08 RX ADMIN — CYCLOBENZAPRINE 10 MG: 10 TABLET, FILM COATED ORAL at 18:27

## 2019-02-08 RX ADMIN — ROSUVASTATIN CALCIUM 10 MG: 10 TABLET, FILM COATED ORAL at 18:32

## 2019-02-08 RX ADMIN — OXYCODONE HYDROCHLORIDE 5 MG: 5 TABLET ORAL at 16:08

## 2019-02-08 RX ADMIN — OXYCODONE HYDROCHLORIDE 5 MG: 5 TABLET ORAL at 21:32

## 2019-02-08 RX ADMIN — BUDESONIDE AND FORMOTEROL FUMARATE DIHYDRATE 2 PUFF: 160; 4.5 AEROSOL RESPIRATORY (INHALATION) at 18:28

## 2019-02-08 RX ADMIN — INSULIN GLARGINE 20 UNITS: 100 INJECTION, SOLUTION SUBCUTANEOUS at 18:33

## 2019-02-08 RX ADMIN — Medication 2 TABLET: at 17:50

## 2019-02-08 ASSESSMENT — COGNITIVE AND FUNCTIONAL STATUS - GENERAL
SUGGESTED CMS G CODE MODIFIER DAILY ACTIVITY: CK
TOILETING: A LITTLE
SUGGESTED CMS G CODE MODIFIER MOBILITY: CK
MOBILITY SCORE: 16
DRESSING REGULAR UPPER BODY CLOTHING: A LITTLE
CLIMB 3 TO 5 STEPS WITH RAILING: A LOT
PERSONAL GROOMING: A LITTLE
TURNING FROM BACK TO SIDE WHILE IN FLAT BAD: A LITTLE
MOVING TO AND FROM BED TO CHAIR: A LITTLE
DRESSING REGULAR LOWER BODY CLOTHING: A LITTLE
MOVING FROM LYING ON BACK TO SITTING ON SIDE OF FLAT BED: A LITTLE
WALKING IN HOSPITAL ROOM: A LOT
EATING MEALS: A LITTLE
HELP NEEDED FOR BATHING: A LITTLE
DAILY ACTIVITIY SCORE: 18
STANDING UP FROM CHAIR USING ARMS: A LITTLE

## 2019-02-08 ASSESSMENT — PATIENT HEALTH QUESTIONNAIRE - PHQ9
SUM OF ALL RESPONSES TO PHQ QUESTIONS 1-9: 14
3. TROUBLE FALLING OR STAYING ASLEEP OR SLEEPING TOO MUCH: NEARLY EVERY DAY
7. TROUBLE CONCENTRATING ON THINGS, SUCH AS READING THE NEWSPAPER OR WATCHING TELEVISION: NOT AT ALL
9. THOUGHTS THAT YOU WOULD BE BETTER OFF DEAD, OR OF HURTING YOURSELF: NOT AT ALL
6. FEELING BAD ABOUT YOURSELF - OR THAT YOU ARE A FAILURE OR HAVE LET YOURSELF OR YOUR FAMILY DOWN: MORE THAN HALF THE DAYS
SUM OF ALL RESPONSES TO PHQ9 QUESTIONS 1 AND 2: 4
4. FEELING TIRED OR HAVING LITTLE ENERGY: NEARLY EVERY DAY
2. FEELING DOWN, DEPRESSED, IRRITABLE, OR HOPELESS: NEARLY EVERY DAY
5. POOR APPETITE OR OVEREATING: MORE THAN HALF THE DAYS
8. MOVING OR SPEAKING SO SLOWLY THAT OTHER PEOPLE COULD HAVE NOTICED. OR THE OPPOSITE, BEING SO FIGETY OR RESTLESS THAT YOU HAVE BEEN MOVING AROUND A LOT MORE THAN USUAL: NOT AT ALL
1. LITTLE INTEREST OR PLEASURE IN DOING THINGS: SEVERAL DAYS

## 2019-02-08 ASSESSMENT — LIFESTYLE VARIABLES
EVER_SMOKED: YES
PACK_YEARS: 1/2

## 2019-02-08 NOTE — DISCHARGE PLANNING
Choice form received at 1430   Sent to [facility], [facility], and [facility]   Choice sent out at [phone].

## 2019-02-08 NOTE — ED NOTES
Patient given warm blankets and box of tissue upon request. Patient asked to remain NPO by this RN until further notice from MD. Patient states understanding. Call light within reach.

## 2019-02-08 NOTE — ED TRIAGE NOTES
"Patient presents to ED via EMS with c/o GLF yesterday at her home over \"a new pair of shoes\". Patient denies dizziness or hitting head / LOC when falling. Patient states right ankle pain, no deformity noted. A&Ox4. Patient states she normally walks with a walker at home and also has a wheelchair.   "

## 2019-02-08 NOTE — ED NOTES
".Urine specimen collected and sent to lab by RN. Patient makes comment to RN, \"can you put a sykes in and keep it in so I don't have to go to the bathroom?\". Patient educated on adverse effects of indwelling catheters. Patient states \" I can't bend my finger\" and shows the finger with POX on it. Patient assisted to bend finger and she demonstrates that she can in fact bend it with no difficulty. Patient given ice pack for right ankle upon request.   "

## 2019-02-08 NOTE — PROGRESS NOTES
Asked to consult on 69 yo woman who twisted ankle yesterday  Has nondisplaced right distal fibula fx  X-rays reviewed  Treat nonop  Cam walker and WBAT  Will see tonight or tomorrow

## 2019-02-08 NOTE — ED NOTES
Patient assisted to use bedside commode. Tolerated well. Patient given food and water upon request.

## 2019-02-08 NOTE — ED NOTES
Walking boot placed on right foot by RN per MD order. Patient educated on proper use and demonstrates understanding.

## 2019-02-08 NOTE — DISCHARGE PLANNING
"Care Transition Team Assessment    MSW received call from ERP to assist pt with discharge planning. Pt has had multiple falls. Pt fell yesterday-tripped over shoes. Pt was discharged from [facility] on 1/30/19 for a fall. Pt went home with [facility] and declined SNF.Given resources from MOHAMUD Faulkner for caregivers and group homes.     MSW met with pt. Per pt, she stated she was doing well until she tripped. Pt lives alone in [place], [place] in a 1 story home. Pt has a friend Claribel Cordero ([phone]) that helps pt at home. Pt makes around $644 a month with SSI. Then gets around $300-500 extra with \"[employer].\" Pt stated she is trying to sell her house (worth around $200,000) to go live at [facility]. Pt doesn't qualify for medicaid as she has other properties in [place].     Pt uses a walker and wheelchair at home. Pt also has a bedside commode and hospital bed (pt reports needs new mattress). Pt wears continuous O2 3 Liters. Preferred is her [employer] company. Pt needs assistance with most ADLS/IADLS. Pt was only bathing when HH would come in. Pt can toilet herself when she is healthy.  Pt's friend does her grocery shopping for her. Pt doesn't do laundry or clean. Pt's friend also assists her with that as well. Pt's PCP is Kavitha Mccall. Pt uses [facility] in [place], [place].     Pt has advanced directive on file. Pt's brother Uzair Zavala ([phone]) is DPOA.     Pt open to going to SNF now to get stronger before she returns home. Pt signed choice for [facility]) [facility]) [facility] 3) [facility]. MSW faxed choice form to [facility]. Will update Unit SW on pt.     Information Source  Orientation : Oriented x 4  Information Given By: Patient    Readmission Evaluation  Is this a readmission?: Yes - unplanned readmission  Why do you think you were readmitted?:  (fall at home)  Did you understand your discharge instructions?: Yes  Did you have enough support after your last discharge?: Yes    Elopement " Risk  Legal Hold: No    Interdisciplinary Discharge Planning  Does Admitting Nurse Feel This Could be a Complex Discharge?: No  Primary Care Physician:  (Corinne Mccall)  Lives with - Patient's Self Care Capacity: Alone and Unable to Care For Self  Patient or legal guardian wants to designate a caregiver (see row info): No  Support Systems: Friends / Neighbors  Housing / Facility: [address]  Do You Take your Prescribed Medications Regularly: Yes  Able to Return to Previous ADL's: Future Time w/Therapy  Mobility Issues: Yes  Prior Services: Home With Outpatient Therapy, Skilled Home Health Services  Patient Expects to be Discharged to::  (SNF)  Assistance Needed: Yes  Durable Medical Equipment: Home Oxygen, Walker, Commode  DME Provider / Phone:  (Preferred)    Discharge Preparedness  What is your plan after discharge?: Skilled nursing facility  What are your discharge supports?: Other (comment) (friends)  Prior Functional Level: Needs Assist with Activities of Daily Living  Difficulity with ADLs: Bathing, Dressing, Toileting, Walking  Difficulity with IADLs: Cooking, Driving, Laundry, Shopping  Difficulity with IADL Comments: Pt's friend shops for her    Functional Assesment  Prior Functional Level: Needs Assist with Activities of Daily Living    Finances  Financial Barriers to Discharge: No  Prescription Coverage: Yes    Vision / Hearing Impairment  Vision Impairment : Yes (glasses)  Hearing Impairment : No    Values / Beliefs / Concerns  Values / Beliefs Concerns : No    Advance Directive  Advance Directive?: DPOA for Health Care  Durable Power of  Name and Contact : Salty Zavala (brother)    Domestic Abuse  Have you ever been the victim of abuse or violence?: No    Psychological Assessment  History of Substance Abuse: None  History of Psychiatric Problems: No    Discharge Risks or Barriers  Discharge risks or barriers?: Post-acute placement / services, Lives alone, no community support  Patient risk  factors: Complex medical needs, Lack of outside supports, Lives alone and no community support, Readmission    Anticipated Discharge Information  Anticipated discharge disposition: SNF  Discharge Address: [address] [place] [place] [address]  Discharge Contact Phone Number: [phone]

## 2019-02-08 NOTE — H&P
Hospital Medicine History & Physical Note    Date of Service  2/8/2019    Primary Care Physician  Kavitha Mccall M.D.    Consultants   Dr. Richmond orthopedics    Code Status  DNR    Chief Complaint  Fall with right ankle pain    History of Presenting Illness  68 y.o. female who presented 2/8/2019 with multiple comorbidities including COPD oxygen dependent on 3.5 L of oxygen diabetes chronic kidney disease anemia who sustained a fall yesterday after tripping while coming back from her garage she started having severe right ankle pain after her fall she tried ice and rest overnight but continued to have severe pain and presented back to the emergency room today for further evaluation.  She lives by herself.  She was recently hospitalized for hyperkalemia.  She has no immediate family other than a brother who is elderly and lives in [place].  She has had 3 falls over the past 3 weeks.  She has come to the realization that she needs more assistance and is considering moving to an assisted living facility.  She has type 2 diabetes and chronic kidney disease and is on Lantus 20 units.    Review of Systems  Review of Systems   All other systems reviewed and are negative.      Past Medical History   has a past medical history of Arthritis; ASTHMA; Backpain; Breath shortness; Bronchitis; Congestive heart failure (HCC) (2013); COPD; Diabetes; Disorder of thyroid; Fall; Fibromyalgia; GERD (gastroesophageal reflux disease); Heart burn; Hepatitis C; Hypertension; Indigestion; Infectious disease; Neuropathy (HCC); On home oxygen therapy; Other specified symptom associated with female genital organs; Pain (9/13/2016); PND (post-nasal drip); Pneumonia; Psychiatric problem (9/13/2016); RLS (restless legs syndrome); Sleep apnea; and Unspecified urinary incontinence.    Surgical History   has a past surgical history that includes gyn surgery; other orthopedic surgery; other orthopedic surgery; us-needle core bx-breast panel;  "lumpectomy (Left); pr inj dx/ther agnt paravert facet joint, bruce* (Left, 7/10/2015); pr inj dx/ther agnt paravert facet joint, bruce* (7/10/2015); pr inj dx/ther agnt paravert facet joint, bruce* (7/10/2015); pr inject nerv blck,othr periph nerv (7/10/2015); pr inj dx/ther agnt paravert facet joint, bruce* (Left, 7/17/2015); pr inj dx/ther agnt paravert facet joint, bruce* (7/17/2015); pr inj dx/ther agnt paravert facet joint, bruce* (7/17/2015); pr inject nerv blck,othr periph nerv (7/17/2015); pr dstr nrolytc agnt parverteb fct sngl lmbr/sacral (Left, 10/2/2015); pr dstr nrolytc agnt parverteb fct addl lmbr/sacral (10/2/2015); pr inject rx other periph nerve (10/2/2015); pr dstr nrolytc agnt parverteb fct addl lmbr/sacral (10/2/2015); pr dstr nrolytc agnt parverteb fct sngl lmbr/sacral (Right, 11/6/2015); pr dstr nrolytc agnt parverteb fct addl lmbr/sacral (11/6/2015); pr inject rx other periph nerve (11/6/2015); pr dstr nrolytc agnt parverteb fct addl lmbr/sacral (11/6/2015); pr dstr nrolytc agnt parverteb fct sngl lmbr/sacral (Left, 9/16/2016); pr dstr nrolytc agnt parverteb fct addl lmbr/sacral (9/16/2016); pr dstr nrolytc agnt parverteb fct addl lmbr/sacral (9/16/2016); pr fluoroscopic guidance needle placement (9/16/2016); and ankle orif (Left, 5/8/2017).     Family History  family history includes Alcohol/Drug in her mother; Cancer in her brother and father; Diabetes in her brother and maternal grandmother; Heart Disease in her mother and paternal grandmother; Lung Disease in her mother; Stroke in her paternal grandmother.     Social History   reports that she has been smoking Cigarettes.  She has a 50.00 pack-year smoking history. She has never used smokeless tobacco. She reports that she does not drink alcohol or use drugs.    Allergies  Allergies   Allergen Reactions   • Vitamin C Diarrhea     \"violent diarrhea\"   • Keflex Rash     Severe rash, but TOLERATES PENICILLINS, Rocephin    • Codeine Nausea     Severe stomach " pain   • Gabapentin Palpitations     Gets shaky and falls    • Lyrica Nausea     Nausea, dizzy   • Sudafed Nausea and Unspecified     Chest pain, nausea       Medications  Prior to Admission Medications   Prescriptions Last Dose Informant Patient Reported? Taking?   DULoxetine (CYMBALTA) 60 MG Cap DR Particles delayed-release capsule 2/8/2019 at AM Patient No No   Sig: TAKE 1 CAPSULE BY MOUTH DAILY   Diclofenac Sodium 1 % Gel PRN at PRN Patient Yes No   Sig: Apply 2 g to skin as directed 2 times a day as needed.   Nutritional Supplements (ANTIOXIDANTS PO) 2/8/2019 at AM Patient Yes No   Sig: Take 1 Tab by mouth every day.   SYMBICORT 160-4.5 MCG/ACT Aerosol 2/8/2019 at AM Patient No No   Sig: INHALE 2 PUFFS BY MOUTH TWO TIMES DAILY   amLODIPine (NORVASC) 10 MG Tab 2/8/2019 at AM Patient No No   Sig: Take 1 Tab by mouth every day.   amitriptyline (ELAVIL) 50 MG Tab PRN at PRN Patient No No   Sig: Take 1 Tab by mouth at bedtime as needed for Sleep.   clobetasol (TEMOVATE) 0.05 % Cream PRN at PRN Patient No No   Sig: Use a thin film to affected skin twice a day as needed.   clotrimazole-betamethasone (LOTRISONE) 1-0.05 % Cream 2/8/2019 at AM Patient No No   Sig: Apply 1 g to affected area(s) 2 times a day.   cyclobenzaprine (FLEXERIL) 5 MG tablet PRN at PRN Patient No No   Sig: Take 1-2 Tabs by mouth 3 times a day as needed.   insulin glargine (LANTUS SOLOSTAR) 100 UNIT/ML Solution Pen-injector injection 2/7/2019 at PM Patient Yes Yes   Sig: Inject 20 Units as instructed every evening.   ipratropium (ATROVENT) 17 MCG/ACT Aero Soln 2/8/2019 at AM Patient No No   Sig: Inhale 2 Puffs by mouth every 6 hours.   lactulose 10 GM/15ML Solution PRN at PRN Patient No No   Sig: TAKE 30 MLS (6 TEASPOONFULS) BY MOUTH TWO TIMES DAILY   levothyroxine (SYNTHROID) 75 MCG Tab 2/8/2019 at AM Patient No No   Sig: TAKE 1 TABLET BY MOUTH DAILY   nystatin/triamcinolone (MYCOLOG) 139449-9.1 UNIT/GM-% Cream PRN at PRN Patient No No   Sig:  APPLY A THIN LAYER TO FUNGAL RASH ONCE DAILY AS NEEDED   oxyCODONE immediate release (ROXICODONE) 10 MG immediate release tablet PRN at PRN Patient No No   Sig: Take 1 Tab by mouth every four hours as needed for Moderate Pain for up to 30 days.   patiromer (VELTASSA) 8.4 g Pack 2/7/2019 at 0200 Patient No No   Sig: Take 8.4 g by mouth every day.   rosuvastatin (CRESTOR) 10 MG Tab 2/7/2019 at PM Patient No No   Sig: Take 1 Tab by mouth every evening.   traZODone (DESYREL) 50 MG Tab 2/7/2019 at PM Patient Yes No   Sig: Take 150 mg by mouth every evening.   varenicline (CHANTIX CONTINUING MONTH LUANN) 1 MG tablet 2/8/2019 at AM Patient No No   Sig: Take 1 Tab by mouth 2 times a day.      Facility-Administered Medications: None       Physical Exam  Temp:  [36.5 °C (97.7 °F)] 36.5 °C (97.7 °F)  Pulse:  [75] 75  Resp:  [18] 18  BP: (126)/(48) 126/48  SpO2:  [98 %] 98 %    Physical Exam   Constitutional: She is oriented to person, place, and time. She appears well-developed and well-nourished.   Obese   HENT:   Head: Normocephalic and atraumatic.   Right Ear: External ear normal.   Left Ear: External ear normal.   Mouth/Throat: No oropharyngeal exudate.   Eyes: Conjunctivae are normal. Right eye exhibits no discharge. Left eye exhibits no discharge. No scleral icterus.   Neck: Neck supple. No JVD present. No tracheal deviation present.   Cardiovascular: Normal rate and regular rhythm.  Exam reveals no gallop and no friction rub.    No murmur heard.  Pulmonary/Chest: Effort normal. No stridor. No respiratory distress. She has decreased breath sounds. She has no wheezes. She has no rales. She exhibits no tenderness.   Abdominal: Soft. Bowel sounds are normal. She exhibits no distension. There is no tenderness. There is no rebound.   Musculoskeletal: She exhibits edema and tenderness (Right distal fibula).   Neurological: She is alert and oriented to person, place, and time. No cranial nerve deficit. She exhibits normal muscle  tone.   Skin: Skin is warm and dry. She is not diaphoretic. No cyanosis. Nails show no clubbing.   Psychiatric: She has a normal mood and affect. Her behavior is normal. Thought content normal.   Nursing note and vitals reviewed.      Laboratory:  Recent Labs      02/08/19   1218   WBC  10.0   RBC  2.74*   HEMOGLOBIN  8.8*   HEMATOCRIT  27.9*   MCV  101.8*   MCH  32.1   MCHC  31.5*   RDW  46.9   PLATELETCT  213   MPV  9.0     Recent Labs      02/08/19   1218   SODIUM  138   POTASSIUM  4.4   CHLORIDE  105   CO2  27   GLUCOSE  175*   BUN  31*   CREATININE  1.86*   CALCIUM  9.2     Recent Labs      02/08/19   1218   ALTSGPT  24   ASTSGOT  19   ALKPHOSPHAT  42   TBILIRUBIN  0.3   GLUCOSE  175*     Recent Labs      02/08/19   1218   APTT  34.5   INR  1.09             No results for input(s): TROPONINI in the last 72 hours.    Urinalysis:    Recent Labs      02/08/19   1215   SPECGRAVITY  1.017   GLUCOSEUR  Negative   KETONES  Trace*   NITRITE  Negative   LEUKESTERAS  Negative   WBCURINE  0-2   RBCURINE  0-2   BACTERIA  Negative   EPITHELCELL  Negative        Imaging:  DX-FOOT-COMPLETE 3+ RIGHT   Final Result      Fracture of the distal fibula with soft tissue swelling along the lateral ankle and foot.      Calcaneal spurring.      Small ankle joint effusion.         DX-ANKLE 3+ VIEWS RIGHT   Final Result      Oblique minimally displaced fracture of the distal fibula with overlying soft tissue swelling.      Calcaneal spurring.      Ankle joint effusion.               Assessment/Plan:  I anticipate this patient is appropriate for observation status at this time.    * Closed fracture of distal end of right fibula   Assessment & Plan    Patient will be admitted for clinical monitoring and pain management  Dr. Richmond has been consulted and will await his evaluation and further recommendations  We will ask for PT OT evaluation patient will likely need referral to SNF for rehab given her recurrent falls and that she lives by  herself     Chronic hypertension- (present on admission)   Assessment & Plan    I would continue amlodipine and monitor blood pressure and adjust accordingly  Her lisinopril has been discontinued during her last hospitalization for hyperkalemia     COPD (chronic obstructive pulmonary disease) ([facility])- (present on admission)   Assessment & Plan    No acute exacerbation  Continue Symbicort  Bronchodilators per RT protocol  Reinforce importance of smoking cessation  Continue Chantix     Obesity (BMI 30-39.9)- (present on admission)   Assessment & Plan    Body mass index is 34.95 kg/m².      Anemia of chronic disease- (present on admission)   Assessment & Plan    No clinical signs of bleeding monitor hemoglobin     Chronic constipation- (present on admission)   Assessment & Plan    We will start her on daily MiraLAX     HLD (hyperlipidemia)- (present on admission)   Assessment & Plan    Continue rosuvastatin     Type 2 diabetes mellitus with hyperglycemia, with long-term current use of insulin ([facility])- (present on admission)   Assessment & Plan    We will continue Lantus 20 units daily  I will start on sliding scale insulin monitor CBGs and adjust accordingly     Chronic respiratory failure ([facility])- (present on admission)   Assessment & Plan    With hypoxia    Continue oxygen and RT protocol     Chronic kidney disease, stage 3 ([facility])- (present on admission)   Assessment & Plan    Continue Valtessa for history of hyperkalemia  Monitor renal function electrolytes     Major depressive disorder ([facility])- (present on admission)   Assessment & Plan    Stable on Cymbalta         VTE prophylaxis: Lovenox

## 2019-02-08 NOTE — ED PROVIDER NOTES
ED Provider Note    Scribed for Anni Dave M.D. by Gab Denton. 2/8/2019  11:57 AM    Primary care provider: Kavitha Mccall M.D.  Means of arrival: EMS  History obtained from: patient  History limited by: none    CHIEF COMPLAINT  Chief Complaint   Patient presents with   • GLF   • Ankle Pain       HPI  Priya Callahan is a 68 y.o. female who presents to the Emergency Department complaining of right ankle pain status post mechanical ground level fall sustained last night. Patient reports associated right knee pain, ankle swelling. She states that she tripped at home causing a ground level fall from standing. Patient states that she iced the joints overnight but her pain did not improve. She reports that she has been unable to bear weight and move about her home where she lives alone. Patient called EMS to bring her to the ED for evaluation. She reports a history of liver dysfunction, diabetes, CHF, thyroid dysfunction, COPD, 3 ground level falls in the last 2 weeks. Patient denies loss of consciousness , impact to head, vomiting.     REVIEW OF SYSTEMS  HEENT:  No ear pain, congestion, or sore throat   EYES: no discharge, redness, or vision changes  CARDIAC: no chest pain, no palpitations    PULMONARY: no dyspnea, cough, or congestion   GI: no vomiting, diarrhea, or abdominal pain   : no dysuria, back pain, or hematuria   Neuro: no weakness, numbness, aphasia, or headache, loss of consciousness  Musculoskeletal: Ankle pain, knee pain, joint swelling.   Endocrine: no fevers, sweating, or weight loss   SKIN: no rash, erythema, or contusions     See history of present illness. All other systems are negative. C.    PAST MEDICAL HISTORY   has a past medical history of Arthritis; ASTHMA; Backpain; Breath shortness; Bronchitis; Congestive heart failure (HCC) (2013); COPD; Diabetes; Disorder of thyroid; Fall; Fibromyalgia; GERD (gastroesophageal reflux disease); Heart burn; Hepatitis C; Hypertension;  Indigestion; Infectious disease; Neuropathy (HCC); On home oxygen therapy; Other specified symptom associated with female genital organs; Pain (9/13/2016); PND (post-nasal drip); Pneumonia; Psychiatric problem (9/13/2016); RLS (restless legs syndrome); Sleep apnea; and Unspecified urinary incontinence.    SURGICAL HISTORY   has a past surgical history that includes gyn surgery; other orthopedic surgery; other orthopedic surgery; us-needle core bx-breast panel; lumpectomy (Left); inj dx/ther agnt paravert facet joint, bruce* (Left, 7/10/2015); inj dx/ther agnt paravert facet joint, bruce* (7/10/2015); inj dx/ther agnt paravert facet joint, bruce* (7/10/2015); inject nerv blck,othr periph nerv (7/10/2015); inj dx/ther agnt paravert facet joint, bruce* (Left, 7/17/2015); inj dx/ther agnt paravert facet joint, bruce* (7/17/2015); inj dx/ther agnt paravert facet joint, bruce* (7/17/2015); inject nerv blck,othr periph nerv (7/17/2015); dstr nrolytc agnt parverteb fct sngl lmbr/sacral (Left, 10/2/2015); dstr nrolytc agnt parverteb fct addl lmbr/sacral (10/2/2015); inject rx other periph nerve (10/2/2015); dstr nrolytc agnt parverteb fct addl lmbr/sacral (10/2/2015); dstr nrolytc agnt parverteb fct sngl lmbr/sacral (Right, 11/6/2015); dstr nrolytc agnt parverteb fct addl lmbr/sacral (11/6/2015); inject rx other periph nerve (11/6/2015); dstr nrolytc agnt parverteb fct addl lmbr/sacral (11/6/2015); dstr nrolytc agnt parverteb fct sngl lmbr/sacral (Left, 9/16/2016); dstr nrolytc agnt parverteb fct addl lmbr/sacral (9/16/2016); dstr nrolytc agnt parverteb fct addl lmbr/sacral (9/16/2016); fluoroscopic guidance needle placement (9/16/2016); and ankle orif (Left, 5/8/2017).    SOCIAL HISTORY  Social History   Substance Use Topics   • Smoking status: Current Every Day Smoker     Packs/day: 1.00     Years: 50.00     Types: Cigarettes     Last attempt to quit: 3/12/2018   • Smokeless tobacco: Never Used   • Alcohol use No      History   Drug Use No  "      FAMILY HISTORY  Family History   Problem Relation Age of Onset   • Lung Disease Mother    • Alcohol/Drug Mother    • Heart Disease Mother    • Cancer Father    • Cancer Brother    • Diabetes Brother    • Diabetes Maternal Grandmother    • Stroke Paternal Grandmother    • Heart Disease Paternal Grandmother        CURRENT MEDICATIONS  Home Medications     Reviewed by Jackie Dejesus PhT (Pharmacy Tech) on 02/08/19 at 1325  Med List Status: Complete   Medication Last Dose Status   amitriptyline (ELAVIL) 50 MG Tab PRN Active   amLODIPine (NORVASC) 10 MG Tab 2/8/2019 Active   clobetasol (TEMOVATE) 0.05 % Cream PRN Active   clotrimazole-betamethasone (LOTRISONE) 1-0.05 % Cream 2/8/2019 Active   cyclobenzaprine (FLEXERIL) 5 MG tablet PRN Active   Diclofenac Sodium 1 % Gel PRN Active   DULoxetine (CYMBALTA) 60 MG Cap DR Particles delayed-release capsule 2/8/2019 Active   insulin glargine (LANTUS SOLOSTAR) 100 UNIT/ML Solution Pen-injector injection 2/7/2019 Active   ipratropium (ATROVENT) 17 MCG/ACT Aero Soln 2/8/2019 Active   lactulose 10 GM/15ML Solution PRN Active   levothyroxine (SYNTHROID) 75 MCG Tab 2/8/2019 Active   Nutritional Supplements (ANTIOXIDANTS PO) 2/8/2019 Active   nystatin/triamcinolone (MYCOLOG) 060578-8.1 UNIT/GM-% Cream PRN Active   oxyCODONE immediate release (ROXICODONE) 10 MG immediate release tablet PRN Active   patiromer (VELTASSA) 8.4 g Pack 2/7/2019 Active   rosuvastatin (CRESTOR) 10 MG Tab 2/7/2019 Active   SYMBICORT 160-4.5 MCG/ACT Aerosol 2/8/2019 Active   traZODone (DESYREL) 50 MG Tab 2/7/2019 Active   varenicline (CHANTIX CONTINUING MONTH LUANN) 1 MG tablet 2/8/2019 Active                ALLERGIES  Allergies   Allergen Reactions   • Vitamin C Diarrhea     \"violent diarrhea\"   • Keflex Rash     Severe rash, but TOLERATES PENICILLINS, Rocephin    • Codeine Nausea     Severe stomach pain   • Gabapentin Palpitations     Gets shaky and falls    • Lyrica Nausea     Nausea, dizzy   • Sudafed " "Nausea and Unspecified     Chest pain, nausea       PHYSICAL EXAM  VITAL SIGNS: /48   Pulse 75   Temp 36.5 °C (97.7 °F) (Tympanic)   Resp 18   Ht 1.651 m (5' 5\")   Wt 95.3 kg (210 lb)   SpO2 98%   BMI 34.95 kg/m²     Constitutional: Well developed, Well nourished, No acute distress, Non-toxic appearance.   HEENT: Normocephalic, Atraumatic,  external ears normal, pharynx pink,  Mucous  Membranes moist, No rhinorrhea or mucosal edema  Eyes: PERRL, EOMI, Conjunctiva normal, No discharge.   Neck: Normal range of motion, No tenderness, Supple, No stridor.   Lymphatic: No lymphadenopathy    Cardiovascular: Regular Rate and Rhythm, No murmurs,  rubs, or gallops.   Thorax & Lungs: Lungs clear to auscultation bilaterally, No respiratory distress, No wheezes, rhales or rhonchi, No chest wall tenderness.   Abdomen: Bowel sounds normal, Soft, non tender, non distended,  No pulsatile masses., no rebound guarding or peritoneal signs. Obese.  Skin: Warm, Dry, No erythema, No rash, Abrasions to right ring finger and right thumb.   Back:  No CVA tenderness,  No spinal tenderness, bony crepitance, step offs, or instability.   Neurologic: Alert & oriented x 3, Normal motor function, Normal sensory function, No focal deficits noted. Normal reflexes. Normal Cranial Nerves.  Extremities: Equal, intact distal pulses, No cyanosis, clubbing.   Musculoskeletal: Good range of motion in all major joints. Bruising and swelling to lateral right ankle. Bruising and swelling to left anterior shin.      DIAGNOSTIC STUDIES / PROCEDURES    LABS  Results for orders placed or performed during the hospital encounter of 02/08/19   CBC WITH DIFFERENTIAL   Result Value Ref Range    WBC 10.0 4.8 - 10.8 K/uL    RBC 2.74 (L) 4.20 - 5.40 M/uL    Hemoglobin 8.8 (L) 12.0 - 16.0 g/dL    Hematocrit 27.9 (L) 37.0 - 47.0 %    .8 (H) 81.4 - 97.8 fL    MCH 32.1 27.0 - 33.0 pg    MCHC 31.5 (L) 33.6 - 35.0 g/dL    RDW 46.9 35.9 - 50.0 fL    Platelet " Count 213 164 - 446 K/uL    MPV 9.0 9.0 - 12.9 fL    Neutrophils-Polys 79.60 (H) 44.00 - 72.00 %    Lymphocytes 10.80 (L) 22.00 - 41.00 %    Monocytes 7.40 0.00 - 13.40 %    Eosinophils 1.20 0.00 - 6.90 %    Basophils 0.40 0.00 - 1.80 %    Immature Granulocytes 0.60 0.00 - 0.90 %    Nucleated RBC 0.00 /100 WBC    Neutrophils (Absolute) 7.94 (H) 2.00 - 7.15 K/uL    Lymphs (Absolute) 1.08 1.00 - 4.80 K/uL    Monos (Absolute) 0.74 0.00 - 0.85 K/uL    Eos (Absolute) 0.12 0.00 - 0.51 K/uL    Baso (Absolute) 0.04 0.00 - 0.12 K/uL    Immature Granulocytes (abs) 0.06 0.00 - 0.11 K/uL    NRBC (Absolute) 0.00 K/uL   CMP   Result Value Ref Range    Sodium 138 135 - 145 mmol/L    Potassium 4.4 3.6 - 5.5 mmol/L    Chloride 105 96 - 112 mmol/L    Co2 27 20 - 33 mmol/L    Anion Gap 6.0 0.0 - 11.9    Glucose 175 (H) 65 - 99 mg/dL    Bun 31 (H) 8 - 22 mg/dL    Creatinine 1.86 (H) 0.50 - 1.40 mg/dL    Calcium 9.2 8.5 - 10.5 mg/dL    AST(SGOT) 19 12 - 45 U/L    ALT(SGPT) 24 2 - 50 U/L    Alkaline Phosphatase 42 30 - 99 U/L    Total Bilirubin 0.3 0.1 - 1.5 mg/dL    Albumin 3.3 3.2 - 4.9 g/dL    Total Protein 6.2 6.0 - 8.2 g/dL    Globulin 2.9 1.9 - 3.5 g/dL    A-G Ratio 1.1 g/dL   PT/INR   Result Value Ref Range    PT 14.2 12.0 - 14.6 sec    INR 1.09 0.87 - 1.13   PTT   Result Value Ref Range    APTT 34.5 24.7 - 36.0 sec   URINALYSIS,CULTURE IF INDICATED   Result Value Ref Range    Color DK Yellow     Character Clear     Specific Gravity 1.017 <1.035    Ph 5.0 5.0 - 8.0    Glucose Negative Negative mg/dL    Ketones Trace (A) Negative mg/dL    Protein 300 (A) Negative mg/dL    Bilirubin Small (A) Negative    Urobilinogen, Urine 0.2 Negative    Nitrite Negative Negative    Leukocyte Esterase Negative Negative    Occult Blood Negative Negative    Micro Urine Req Microscopic    URINE MICROSCOPIC (W/UA)   Result Value Ref Range    WBC 0-2 /hpf    RBC 0-2 /hpf    Bacteria Negative None /hpf    Epithelial Cells Negative /hpf    Mucous Threads  Moderate /hpf    Hyaline Cast 3-5 (A) /lpf   ESTIMATED GFR   Result Value Ref Range    GFR If  32 (A) >60 mL/min/1.73 m 2    GFR If Non  27 (A) >60 mL/min/1.73 m 2   All labs reviewed by me.    RADIOLOGY  DX-FOOT-COMPLETE 3+ RIGHT   Final Result      Fracture of the distal fibula with soft tissue swelling along the lateral ankle and foot.      Calcaneal spurring.      Small ankle joint effusion.         DX-ANKLE 3+ VIEWS RIGHT   Final Result      Oblique minimally displaced fracture of the distal fibula with overlying soft tissue swelling.      Calcaneal spurring.      Ankle joint effusion.         The radiologist's interpretation of all radiological studies have been reviewed by me.    COURSE & MEDICAL DECISION MAKING  Nursing notes, VS, PMSFHx reviewed in chart.    11:57 AM Patient seen and examined at bedside. Discussed her evaluation in the ED. Ordered DX ankle, DX foot, Urinalysis, PTT, CBC with differential, CMP, PT/INR, Urine microscope to evaluate her symptoms. The differential diagnoses include but are not limited to: ankle sprain with failure to thrive.     1:07 PM - [facility] hospitalist. She will be placed kameron boot for admission.     1:10 PM - I discussed the patient's case and the above findings with Dr. Corona (hospitalist) who agrees to admit the patient. [facility].     1:12 PM - I discussed the patient's case and the above findings with  who will consult on the patient's social situation and rehabilitation.     1:14 PM - Patient reevaluated at bedside. Discussed her results and admission to the hospital     2:20 PM - I discussed the patient's case and the above findings with Dr. Hollingsworth (ortho) who agrees to consult.     DISPOSITION:  Patient will be admitted to hospital in guarded condition.    FINAL IMPRESSION  1. Fall, initial encounter    2. Other closed fracture of distal end of right fibula, initial encounter    3. Weakness    4. Chronic back  pain greater than 3 months duration          IGab (Scribe), am scribing for, and in the presence of, Anni Dave M.D..    Electronically signed by: Gab Denton (Scribe), 2/8/2019    Anni CLAY M.D. personally performed the services described in this documentation, as scribed by Gab Denton in my presence, and it is both accurate and complete.    The note accurately reflects work and decisions made by me.  Anni Dave  2/8/2019  2:55 PM

## 2019-02-09 PROBLEM — R29.6 FREQUENT FALLS: Status: ACTIVE | Noted: 2019-02-09

## 2019-02-09 PROBLEM — D75.89 MACROCYTOSIS: Status: ACTIVE | Noted: 2019-02-09

## 2019-02-09 LAB
ANION GAP SERPL CALC-SCNC: 7 MMOL/L (ref 0–11.9)
BASOPHILS # BLD AUTO: 0.3 % (ref 0–1.8)
BASOPHILS # BLD: 0.03 K/UL (ref 0–0.12)
BUN SERPL-MCNC: 34 MG/DL (ref 8–22)
CALCIUM SERPL-MCNC: 9.6 MG/DL (ref 8.5–10.5)
CHLORIDE SERPL-SCNC: 105 MMOL/L (ref 96–112)
CO2 SERPL-SCNC: 22 MMOL/L (ref 20–33)
COMMENT 1642: NORMAL
CREAT SERPL-MCNC: 1.68 MG/DL (ref 0.5–1.4)
EOSINOPHIL # BLD AUTO: 0.19 K/UL (ref 0–0.51)
EOSINOPHIL NFR BLD: 1.7 % (ref 0–6.9)
ERYTHROCYTE [DISTWIDTH] IN BLOOD BY AUTOMATED COUNT: 47.4 FL (ref 35.9–50)
GLUCOSE BLD-MCNC: 116 MG/DL (ref 65–99)
GLUCOSE BLD-MCNC: 121 MG/DL (ref 65–99)
GLUCOSE BLD-MCNC: 125 MG/DL (ref 65–99)
GLUCOSE BLD-MCNC: 168 MG/DL (ref 65–99)
GLUCOSE SERPL-MCNC: 148 MG/DL (ref 65–99)
HCT VFR BLD AUTO: 29.3 % (ref 37–47)
HGB BLD-MCNC: 9.2 G/DL (ref 12–16)
IMM GRANULOCYTES # BLD AUTO: 0.07 K/UL (ref 0–0.11)
IMM GRANULOCYTES NFR BLD AUTO: 0.6 % (ref 0–0.9)
LYMPHOCYTES # BLD AUTO: 1.8 K/UL (ref 1–4.8)
LYMPHOCYTES NFR BLD: 16.3 % (ref 22–41)
MCH RBC QN AUTO: 32.1 PG (ref 27–33)
MCHC RBC AUTO-ENTMCNC: 31.4 G/DL (ref 33.6–35)
MCV RBC AUTO: 102.1 FL (ref 81.4–97.8)
MONOCYTES # BLD AUTO: 0.84 K/UL (ref 0–0.85)
MONOCYTES NFR BLD AUTO: 7.6 % (ref 0–13.4)
MORPHOLOGY BLD-IMP: NORMAL
NEUTROPHILS # BLD AUTO: 8.11 K/UL (ref 2–7.15)
NEUTROPHILS NFR BLD: 73.5 % (ref 44–72)
NRBC # BLD AUTO: 0 K/UL
NRBC BLD-RTO: 0 /100 WBC
PLATELET # BLD AUTO: 221 K/UL (ref 164–446)
PMV BLD AUTO: 9.2 FL (ref 9–12.9)
POTASSIUM SERPL-SCNC: 4.5 MMOL/L (ref 3.6–5.5)
RBC # BLD AUTO: 2.87 M/UL (ref 4.2–5.4)
SODIUM SERPL-SCNC: 134 MMOL/L (ref 135–145)
WBC # BLD AUTO: 11 K/UL (ref 4.8–10.8)

## 2019-02-09 PROCEDURE — 700111 HCHG RX REV CODE 636 W/ 250 OVERRIDE (IP): Performed by: HOSPITALIST

## 2019-02-09 PROCEDURE — 97161 PT EVAL LOW COMPLEX 20 MIN: CPT

## 2019-02-09 PROCEDURE — A9270 NON-COVERED ITEM OR SERVICE: HCPCS | Performed by: FAMILY MEDICINE

## 2019-02-09 PROCEDURE — 36415 COLL VENOUS BLD VENIPUNCTURE: CPT

## 2019-02-09 PROCEDURE — A9270 NON-COVERED ITEM OR SERVICE: HCPCS | Performed by: HOSPITALIST

## 2019-02-09 PROCEDURE — 700102 HCHG RX REV CODE 250 W/ 637 OVERRIDE(OP): Performed by: FAMILY MEDICINE

## 2019-02-09 PROCEDURE — 82962 GLUCOSE BLOOD TEST: CPT

## 2019-02-09 PROCEDURE — G0378 HOSPITAL OBSERVATION PER HR: HCPCS

## 2019-02-09 PROCEDURE — 97165 OT EVAL LOW COMPLEX 30 MIN: CPT

## 2019-02-09 PROCEDURE — 700102 HCHG RX REV CODE 250 W/ 637 OVERRIDE(OP): Performed by: HOSPITALIST

## 2019-02-09 PROCEDURE — 99226 PR SUBSEQUENT OBSERVATION CARE,LEVEL III: CPT | Performed by: FAMILY MEDICINE

## 2019-02-09 PROCEDURE — 80048 BASIC METABOLIC PNL TOTAL CA: CPT

## 2019-02-09 PROCEDURE — 96372 THER/PROPH/DIAG INJ SC/IM: CPT

## 2019-02-09 PROCEDURE — 85025 COMPLETE CBC W/AUTO DIFF WBC: CPT

## 2019-02-09 RX ORDER — POLYETHYLENE GLYCOL 3350 17 G/17G
1 POWDER, FOR SOLUTION ORAL
Status: DISCONTINUED | OUTPATIENT
Start: 2019-02-09 | End: 2019-02-14 | Stop reason: HOSPADM

## 2019-02-09 RX ORDER — BISACODYL 10 MG
10 SUPPOSITORY, RECTAL RECTAL
Status: DISCONTINUED | OUTPATIENT
Start: 2019-02-09 | End: 2019-02-14 | Stop reason: HOSPADM

## 2019-02-09 RX ORDER — AMOXICILLIN 250 MG
2 CAPSULE ORAL 2 TIMES DAILY
Status: DISCONTINUED | OUTPATIENT
Start: 2019-02-09 | End: 2019-02-14 | Stop reason: HOSPADM

## 2019-02-09 RX ADMIN — IPRATROPIUM BROMIDE 2 PUFF: 17 AEROSOL, METERED RESPIRATORY (INHALATION) at 04:42

## 2019-02-09 RX ADMIN — CYCLOBENZAPRINE 10 MG: 10 TABLET, FILM COATED ORAL at 08:23

## 2019-02-09 RX ADMIN — Medication 2 TABLET: at 04:35

## 2019-02-09 RX ADMIN — TRAZODONE HYDROCHLORIDE 150 MG: 50 TABLET ORAL at 20:16

## 2019-02-09 RX ADMIN — MAGNESIUM HYDROXIDE 30 ML: 400 SUSPENSION ORAL at 17:54

## 2019-02-09 RX ADMIN — OXYCODONE HYDROCHLORIDE 5 MG: 5 TABLET ORAL at 12:55

## 2019-02-09 RX ADMIN — SENNOSIDES AND DOCUSATE SODIUM 2 TABLET: 8.6; 5 TABLET ORAL at 10:41

## 2019-02-09 RX ADMIN — IPRATROPIUM BROMIDE 2 PUFF: 17 AEROSOL, METERED RESPIRATORY (INHALATION) at 17:53

## 2019-02-09 RX ADMIN — IPRATROPIUM BROMIDE 2 PUFF: 17 AEROSOL, METERED RESPIRATORY (INHALATION) at 12:55

## 2019-02-09 RX ADMIN — OXYCODONE HYDROCHLORIDE 5 MG: 5 TABLET ORAL at 04:36

## 2019-02-09 RX ADMIN — PATIROMER 8.4 G: 8.4 POWDER, FOR SUSPENSION ORAL at 01:24

## 2019-02-09 RX ADMIN — DULOXETINE HYDROCHLORIDE 60 MG: 30 CAPSULE, DELAYED RELEASE ORAL at 04:35

## 2019-02-09 RX ADMIN — OXYCODONE HYDROCHLORIDE 5 MG: 5 TABLET ORAL at 17:52

## 2019-02-09 RX ADMIN — INSULIN HUMAN 2 UNITS: 100 INJECTION, SOLUTION PARENTERAL at 20:17

## 2019-02-09 RX ADMIN — IPRATROPIUM BROMIDE 2 PUFF: 17 AEROSOL, METERED RESPIRATORY (INHALATION) at 01:24

## 2019-02-09 RX ADMIN — BUDESONIDE AND FORMOTEROL FUMARATE DIHYDRATE 2 PUFF: 160; 4.5 AEROSOL RESPIRATORY (INHALATION) at 17:52

## 2019-02-09 RX ADMIN — CYCLOBENZAPRINE 10 MG: 10 TABLET, FILM COATED ORAL at 17:52

## 2019-02-09 RX ADMIN — BISACODYL 10 MG RECTAL SUPPOSITORY 10 MG: at 20:17

## 2019-02-09 RX ADMIN — LEVOTHYROXINE SODIUM 75 MCG: 75 TABLET ORAL at 04:36

## 2019-02-09 RX ADMIN — SENNOSIDES AND DOCUSATE SODIUM 2 TABLET: 8.6; 5 TABLET ORAL at 17:52

## 2019-02-09 RX ADMIN — ROSUVASTATIN CALCIUM 10 MG: 10 TABLET, FILM COATED ORAL at 17:54

## 2019-02-09 RX ADMIN — AMLODIPINE BESYLATE 10 MG: 10 TABLET ORAL at 04:35

## 2019-02-09 RX ADMIN — POLYETHYLENE GLYCOL 3350 1 PACKET: 17 POWDER, FOR SOLUTION ORAL at 04:36

## 2019-02-09 RX ADMIN — HYDROMORPHONE HYDROCHLORIDE 0.25 MG: 1 INJECTION, SOLUTION INTRAMUSCULAR; INTRAVENOUS; SUBCUTANEOUS at 01:27

## 2019-02-09 RX ADMIN — INSULIN GLARGINE 20 UNITS: 100 INJECTION, SOLUTION SUBCUTANEOUS at 17:48

## 2019-02-09 RX ADMIN — OXYCODONE HYDROCHLORIDE 5 MG: 5 TABLET ORAL at 22:10

## 2019-02-09 RX ADMIN — VARENICLINE TARTRATE 1 MG: 1 TABLET, FILM COATED ORAL at 18:00

## 2019-02-09 RX ADMIN — BUDESONIDE AND FORMOTEROL FUMARATE DIHYDRATE 2 PUFF: 160; 4.5 AEROSOL RESPIRATORY (INHALATION) at 04:39

## 2019-02-09 RX ADMIN — VARENICLINE TARTRATE 1 MG: 1 TABLET, FILM COATED ORAL at 09:00

## 2019-02-09 RX ADMIN — OXYCODONE HYDROCHLORIDE 5 MG: 5 TABLET ORAL at 08:23

## 2019-02-09 ASSESSMENT — ENCOUNTER SYMPTOMS
DIARRHEA: 0
CONSTIPATION: 1
COUGH: 0
WEAKNESS: 0
HEARTBURN: 0
SORE THROAT: 0
DIZZINESS: 0
CHILLS: 0
WHEEZING: 0
FEVER: 0
NECK PAIN: 0
ABDOMINAL PAIN: 0
NAUSEA: 0
HEADACHES: 0
VOMITING: 0
BACK PAIN: 0
NERVOUS/ANXIOUS: 0
SHORTNESS OF BREATH: 0
PALPITATIONS: 0
BLURRED VISION: 0

## 2019-02-09 ASSESSMENT — COGNITIVE AND FUNCTIONAL STATUS - GENERAL
CLIMB 3 TO 5 STEPS WITH RAILING: A LOT
WALKING IN HOSPITAL ROOM: A LITTLE
SUGGESTED CMS G CODE MODIFIER DAILY ACTIVITY: CK
TOILETING: A LITTLE
MOBILITY SCORE: 18
SUGGESTED CMS G CODE MODIFIER MOBILITY: CK
DRESSING REGULAR LOWER BODY CLOTHING: A LITTLE
DAILY ACTIVITIY SCORE: 19
DRESSING REGULAR UPPER BODY CLOTHING: A LITTLE
HELP NEEDED FOR BATHING: A LITTLE
MOVING FROM LYING ON BACK TO SITTING ON SIDE OF FLAT BED: A LITTLE
MOVING TO AND FROM BED TO CHAIR: A LITTLE
PERSONAL GROOMING: A LITTLE
STANDING UP FROM CHAIR USING ARMS: A LITTLE

## 2019-02-09 ASSESSMENT — GAIT ASSESSMENTS
GAIT LEVEL OF ASSIST: STAND BY ASSIST
DEVIATION: ANTALGIC;STEP TO
ASSISTIVE DEVICE: FRONT WHEEL WALKER
DISTANCE (FEET): 5

## 2019-02-09 ASSESSMENT — ACTIVITIES OF DAILY LIVING (ADL): TOILETING: INDEPENDENT

## 2019-02-09 NOTE — THERAPY
"Occupational Therapy Evaluation completed.   Functional Status:  Pt presents to skilled OT services following GLF at home resulting in fibula fx, non op, WBAT with cam boot on. Pt performed LB dressing with cga/min a including boot management, ambulated short distance to BR with close cga, UB dressing supervision, toileting min a 2/2 fatigue. Pt demonstrates decreased activity tolerance, impaired balance, pain, generalized strength deficits. Pt will benefit from acute skilled OT services while in house and post acute recommended prior to d/c home given pt's high PLOF and limited support in the community.   Plan of Care: Will benefit from Occupational Therapy 3 times per week  Discharge Recommendations:  Equipment: Will Continue to Assess for Equipment Needs. Post-acute therapy Recommend inpatient transitional care services for continued occupational therapy services.       See \"Rehab Therapy-Acute\" Patient Summary Report for complete documentation.    "

## 2019-02-09 NOTE — DISCHARGE PLANNING
[facility]: [facility]  Pending more information  They need PT/OT notes  Per LSW Kailey; orders for PT/OT put in   No

## 2019-02-09 NOTE — RESPIRATORY CARE
COPD EDUCATION by COPD CLINICAL EDUCATOR  2/9/2019 at 6:27 AM by Aida Vega     Patient reviewed by COPD education team. Patient does not qualify for COPD program.

## 2019-02-09 NOTE — CARE PLAN
Problem: Communication  Goal: The ability to communicate needs accurately and effectively will improve  Outcome: PROGRESSING AS EXPECTED  Pt able to communicate needs effectively; pt uses call light appropriately. Hourly rounding in place.     Problem: Bowel/Gastric:  Goal: Normal bowel function is maintained or improved  Outcome: PROGRESSING AS EXPECTED  Pt c/o of constipation, last bm prior to arrival. Had small bm this am, however states that she feels constipated still. Bowel protocol in place.

## 2019-02-09 NOTE — CARE PLAN
Problem: Communication  Goal: The ability to communicate needs accurately and effectively will improve  Outcome: PROGRESSING AS EXPECTED  Pt is able to verbalize needs and uses call light appropriately.

## 2019-02-09 NOTE — ASSESSMENT & PLAN NOTE
PT/OT    2/12 encourage activity  Patient now refusing snf placement  RN to cont therapy, OOB tid  ? Home with home health    2/13 encourage activity

## 2019-02-09 NOTE — PROGRESS NOTES
Assumed care at 0700, report received from NOC RN.  Pt A&O x 4, states pain is 7/10--medication given per MAR. Pt up SBA to bedside commode; PT evaluation done today. Plan of care updated; No questions at this time. Bed locked, 2 rails up, bed in lowest position. Call light in place, belongings at bedside, no needs at this time and hourly rounding in place.

## 2019-02-09 NOTE — PROGRESS NOTES
· 2 RN skin check complete.   · Devices in place oxygen tubing.  · Skin assessed under devices yes.  · Confirmed pressure ulcers found on N/A.  · New potential pressure ulcers noted on N/A. Wound consult placed? N/A. Photo uploaded? N/A. MIDAS completed? N/A  · The following interventions are in place heels floated, silicone oxygen tubing in use, foam dressing placed on L shin tear, pt has new bandaids on skin tears on bilateral hands, redness noted underneath R breast, pt able to turn with assistance, up sba to commode. No other open skin.

## 2019-02-09 NOTE — PROGRESS NOTES
Received handoff report from SHITAL Daugherty and assumed pt care at 1900.  Pt resting in bed comfortably. Labs reviewed. Patient and RN discussed plan of care and questions were answered. Bed in lowest and locked position and bed alarm in place. Call light and personal belongings within reach.

## 2019-02-09 NOTE — PROGRESS NOTES
Pt up to unit via gurney, transferred self to bed by scooting over. Pt has boot on RLE, weight bearing as tolerated. Pt has chronic oxygen needs, pain 7/10--chronic back pain. Pt is A&Ox4, oriented to unit, call light system, reminded to call before getting up, and given communication folder. Pt's plan of care discussed.

## 2019-02-09 NOTE — PROGRESS NOTES
[facility] Medicine Daily Progress Note    Date of Service  2/9/2019    Chief Complaint  68 y.o. female admitted 2/8/2019 with Fibula fracture    Hospital Course  Admitted with Fibula fracture after a fall, frequent falls occurring 3 times the past month.    Interval Problem Update  Fibula fracture -nonsurgical per orthopedic surgery  Fall -already using a walker at home.  Hypertension -controlled  Diabetes - BS low to mid 100s    Consultants/Specialty  Orthopedic surgery    Code Status  Full    Disposition  TBD    Review of Systems  Review of Systems   Constitutional: Negative for chills, fever and malaise/fatigue.   HENT: Negative for hearing loss and sore throat.    Eyes: Negative for blurred vision.   Respiratory: Negative for cough, shortness of breath and wheezing.    Cardiovascular: Negative for chest pain, palpitations and leg swelling.   Gastrointestinal: Positive for constipation. Negative for abdominal pain, diarrhea, heartburn, nausea and vomiting.   Genitourinary: Negative for dysuria.   Musculoskeletal: Positive for joint pain. Negative for back pain and neck pain.   Skin: Negative for rash.   Neurological: Negative for dizziness, weakness and headaches.   Psychiatric/Behavioral: The patient is not nervous/anxious.         Physical Exam  Temp:  [36.1 °C (97 °F)-36.7 °C (98.1 °F)] 36.7 °C (98.1 °F)  Pulse:  [63-80] 77  Resp:  [17-18] 18  BP: (126-159)/(48-79) 136/72  SpO2:  [95 %-98 %] 96 %    Physical Exam   Constitutional: She is oriented to person, place, and time. She appears well-developed and well-nourished.   HENT:   Head: Normocephalic and atraumatic.   Eyes: Pupils are equal, round, and reactive to light. Conjunctivae are normal.   Neck: No tracheal deviation present. No thyromegaly present.   Cardiovascular: Normal rate and regular rhythm.    Pulmonary/Chest: Effort normal and breath sounds normal.   Abdominal: Soft. Bowel sounds are normal. She exhibits no distension. There is no tenderness.    Musculoskeletal: She exhibits edema.   Boot at right leg   Lymphadenopathy:     She has no cervical adenopathy.   Neurological: She is alert and oriented to person, place, and time.   Skin: Skin is warm and dry.   Nursing note and vitals reviewed.      Fluids  No intake or output data in the 24 hours ending 02/09/19 1020    Laboratory  Recent Labs      02/08/19   1218  02/09/19   0127   WBC  10.0  11.0*   RBC  2.74*  2.87*   HEMOGLOBIN  8.8*  9.2*   HEMATOCRIT  27.9*  29.3*   MCV  101.8*  102.1*   MCH  32.1  32.1   MCHC  31.5*  31.4*   RDW  46.9  47.4   PLATELETCT  213  221   MPV  9.0  9.2     Recent Labs      02/08/19   1218  02/09/19   0127   SODIUM  138  134*   POTASSIUM  4.4  4.5   CHLORIDE  105  105   CO2  27  22   GLUCOSE  175*  148*   BUN  31*  34*   CREATININE  1.86*  1.68*   CALCIUM  9.2  9.6     Recent Labs      02/08/19   1218   APTT  34.5   INR  1.09               Imaging  DX-FOOT-COMPLETE 3+ RIGHT   Final Result      Fracture of the distal fibula with soft tissue swelling along the lateral ankle and foot.      Calcaneal spurring.      Small ankle joint effusion.         DX-ANKLE 3+ VIEWS RIGHT   Final Result      Oblique minimally displaced fracture of the distal fibula with overlying soft tissue swelling.      Calcaneal spurring.      Ankle joint effusion.              Assessment/Plan  * Closed fracture of distal end of right fibula- (present on admission)   Assessment & Plan    Cam walker, WBAT  PT/OT  Check vit d level     Frequent falls- (present on admission)   Assessment & Plan    PT/OT  Check vitamin B12 and D levels     Macrocytosis- (present on admission)   Assessment & Plan    Check b12, folate, tsh     Chronic hypertension- (present on admission)   Assessment & Plan    Amlodipine     Anemia of chronic disease- (present on admission)   Assessment & Plan    Follow cbc     Leukocytosis- (present on admission)   Assessment & Plan    Follow CBC     Type 2 diabetes mellitus with hyperglycemia,  with long-term current use of insulin (HCC)- (present on admission)   Assessment & Plan    Lantus, SSI     Chronic respiratory failure (HCC)- (present on admission)   Assessment & Plan    Keep O2 sats above 92%  Encourage I-S, RT protocol     Chronic kidney disease, stage 3 ([facility])- (present on admission)   Assessment & Plan    Follow bmp     COPD (chronic obstructive pulmonary disease) ([facility])- (present on admission)   Assessment & Plan    Symbicort, RT protocol     Obesity (BMI 30-39.9)- (present on admission)   Assessment & Plan    Body mass index is 34.95 kg/m².      HLD (hyperlipidemia)- (present on admission)   Assessment & Plan    Rosuvastatin     Tobacco abuse disorder- (present on admission)   Assessment & Plan    Chantix     Chronic constipation- (present on admission)   Assessment & Plan    Bowel protocol     Hypothyroidism- (present on admission)   Assessment & Plan    Synthroid, check TSH     Major depressive disorder ([facility])- (present on admission)   Assessment & Plan    Cymbalta          VTE prophylaxis: Lovenox

## 2019-02-09 NOTE — THERAPY
"69 y/o female adm afer GLF, sustained right ankle fx(fib fx) non operative, WBAT in CAM boot. Pt on ho9me oxygen, low tolerance to activity. Altered balance with standing and amb with FWW due to cam boot. Acute PT to address transfers, gait with  fww, balance and activity tolerance for pt to achieve higher level of function and to faciltae a safe DC.    Physical Therapy Evaluation completed.   Bed Mobility:  Supine to Sit: Supervised  Transfers: Sit to Stand: Stand by Assist  Gait: Level Of Assist: Stand by Assist with Front-Wheel Walker       Plan of Care: Will benefit from Physical Therapy 3 times per week  Discharge Recommendations: Equipment: Will Continue to Assess for Equipment Needs. Post-acute therapy Discharge to a transitional care facility for continued skilled therapy services.    See \"Rehab Therapy-Acute\" Patient Summary Report for complete documentation.     "

## 2019-02-10 PROBLEM — D62 ANEMIA ASSOCIATED WITH ACUTE BLOOD LOSS: Status: ACTIVE | Noted: 2019-02-10

## 2019-02-10 LAB
25(OH)D3 SERPL-MCNC: 30 NG/ML (ref 30–100)
ANION GAP SERPL CALC-SCNC: 5 MMOL/L (ref 0–11.9)
BASOPHILS # BLD AUTO: 0.3 % (ref 0–1.8)
BASOPHILS # BLD: 0.03 K/UL (ref 0–0.12)
BUN SERPL-MCNC: 36 MG/DL (ref 8–22)
CALCIUM SERPL-MCNC: 8.8 MG/DL (ref 8.5–10.5)
CHLORIDE SERPL-SCNC: 102 MMOL/L (ref 96–112)
CO2 SERPL-SCNC: 28 MMOL/L (ref 20–33)
CREAT SERPL-MCNC: 1.79 MG/DL (ref 0.5–1.4)
EOSINOPHIL # BLD AUTO: 0.16 K/UL (ref 0–0.51)
EOSINOPHIL NFR BLD: 1.5 % (ref 0–6.9)
ERYTHROCYTE [DISTWIDTH] IN BLOOD BY AUTOMATED COUNT: 46.7 FL (ref 35.9–50)
FOLATE SERPL-MCNC: 12.8 NG/ML
GLUCOSE BLD-MCNC: 101 MG/DL (ref 65–99)
GLUCOSE BLD-MCNC: 106 MG/DL (ref 65–99)
GLUCOSE BLD-MCNC: 126 MG/DL (ref 65–99)
GLUCOSE BLD-MCNC: 208 MG/DL (ref 65–99)
GLUCOSE SERPL-MCNC: 163 MG/DL (ref 65–99)
HCT VFR BLD AUTO: 25.4 % (ref 37–47)
HGB BLD-MCNC: 7.9 G/DL (ref 12–16)
IMM GRANULOCYTES # BLD AUTO: 0.06 K/UL (ref 0–0.11)
IMM GRANULOCYTES NFR BLD AUTO: 0.6 % (ref 0–0.9)
LYMPHOCYTES # BLD AUTO: 1.31 K/UL (ref 1–4.8)
LYMPHOCYTES NFR BLD: 12.4 % (ref 22–41)
MCH RBC QN AUTO: 31.5 PG (ref 27–33)
MCHC RBC AUTO-ENTMCNC: 31.1 G/DL (ref 33.6–35)
MCV RBC AUTO: 101.2 FL (ref 81.4–97.8)
MONOCYTES # BLD AUTO: 0.83 K/UL (ref 0–0.85)
MONOCYTES NFR BLD AUTO: 7.8 % (ref 0–13.4)
NEUTROPHILS # BLD AUTO: 8.21 K/UL (ref 2–7.15)
NEUTROPHILS NFR BLD: 77.4 % (ref 44–72)
NRBC # BLD AUTO: 0 K/UL
NRBC BLD-RTO: 0 /100 WBC
PLATELET # BLD AUTO: 178 K/UL (ref 164–446)
PMV BLD AUTO: 8.6 FL (ref 9–12.9)
POTASSIUM SERPL-SCNC: 4.7 MMOL/L (ref 3.6–5.5)
RBC # BLD AUTO: 2.51 M/UL (ref 4.2–5.4)
SODIUM SERPL-SCNC: 135 MMOL/L (ref 135–145)
TSH SERPL DL<=0.005 MIU/L-ACNC: 0.85 UIU/ML (ref 0.38–5.33)
VIT B12 SERPL-MCNC: 407 PG/ML (ref 211–911)
WBC # BLD AUTO: 10.6 K/UL (ref 4.8–10.8)

## 2019-02-10 PROCEDURE — 85025 COMPLETE CBC W/AUTO DIFF WBC: CPT

## 2019-02-10 PROCEDURE — 36415 COLL VENOUS BLD VENIPUNCTURE: CPT

## 2019-02-10 PROCEDURE — A9270 NON-COVERED ITEM OR SERVICE: HCPCS | Performed by: HOSPITALIST

## 2019-02-10 PROCEDURE — 700102 HCHG RX REV CODE 250 W/ 637 OVERRIDE(OP): Performed by: HOSPITALIST

## 2019-02-10 PROCEDURE — A9270 NON-COVERED ITEM OR SERVICE: HCPCS | Performed by: FAMILY MEDICINE

## 2019-02-10 PROCEDURE — 700102 HCHG RX REV CODE 250 W/ 637 OVERRIDE(OP): Performed by: FAMILY MEDICINE

## 2019-02-10 PROCEDURE — 80048 BASIC METABOLIC PNL TOTAL CA: CPT

## 2019-02-10 PROCEDURE — 99225 PR SUBSEQUENT OBSERVATION CARE,LEVEL II: CPT | Performed by: FAMILY MEDICINE

## 2019-02-10 PROCEDURE — 82746 ASSAY OF FOLIC ACID SERUM: CPT

## 2019-02-10 PROCEDURE — 96372 THER/PROPH/DIAG INJ SC/IM: CPT

## 2019-02-10 PROCEDURE — 84443 ASSAY THYROID STIM HORMONE: CPT

## 2019-02-10 PROCEDURE — G0378 HOSPITAL OBSERVATION PER HR: HCPCS

## 2019-02-10 PROCEDURE — 82607 VITAMIN B-12: CPT

## 2019-02-10 PROCEDURE — 82962 GLUCOSE BLOOD TEST: CPT

## 2019-02-10 PROCEDURE — 82306 VITAMIN D 25 HYDROXY: CPT

## 2019-02-10 RX ADMIN — IPRATROPIUM BROMIDE 2 PUFF: 17 AEROSOL, METERED RESPIRATORY (INHALATION) at 01:23

## 2019-02-10 RX ADMIN — CYCLOBENZAPRINE 10 MG: 10 TABLET, FILM COATED ORAL at 08:57

## 2019-02-10 RX ADMIN — TRAZODONE HYDROCHLORIDE 150 MG: 50 TABLET ORAL at 20:33

## 2019-02-10 RX ADMIN — CYCLOBENZAPRINE 10 MG: 10 TABLET, FILM COATED ORAL at 22:08

## 2019-02-10 RX ADMIN — AMLODIPINE BESYLATE 10 MG: 10 TABLET ORAL at 05:00

## 2019-02-10 RX ADMIN — PATIROMER 8.4 G: 8.4 POWDER, FOR SUSPENSION ORAL at 01:23

## 2019-02-10 RX ADMIN — OXYCODONE HYDROCHLORIDE 5 MG: 5 TABLET ORAL at 17:20

## 2019-02-10 RX ADMIN — VARENICLINE TARTRATE 1 MG: 1 TABLET, FILM COATED ORAL at 18:00

## 2019-02-10 RX ADMIN — DULOXETINE HYDROCHLORIDE 60 MG: 30 CAPSULE, DELAYED RELEASE ORAL at 05:00

## 2019-02-10 RX ADMIN — SENNOSIDES AND DOCUSATE SODIUM 2 TABLET: 8.6; 5 TABLET ORAL at 17:19

## 2019-02-10 RX ADMIN — IPRATROPIUM BROMIDE 2 PUFF: 17 AEROSOL, METERED RESPIRATORY (INHALATION) at 12:32

## 2019-02-10 RX ADMIN — BISACODYL 10 MG RECTAL SUPPOSITORY 10 MG: at 17:20

## 2019-02-10 RX ADMIN — BUDESONIDE AND FORMOTEROL FUMARATE DIHYDRATE 2 PUFF: 160; 4.5 AEROSOL RESPIRATORY (INHALATION) at 17:18

## 2019-02-10 RX ADMIN — ROSUVASTATIN CALCIUM 10 MG: 10 TABLET, FILM COATED ORAL at 17:19

## 2019-02-10 RX ADMIN — IPRATROPIUM BROMIDE 2 PUFF: 17 AEROSOL, METERED RESPIRATORY (INHALATION) at 05:04

## 2019-02-10 RX ADMIN — OXYCODONE HYDROCHLORIDE 5 MG: 5 TABLET ORAL at 12:46

## 2019-02-10 RX ADMIN — OXYCODONE HYDROCHLORIDE 5 MG: 5 TABLET ORAL at 05:01

## 2019-02-10 RX ADMIN — OXYCODONE HYDROCHLORIDE 5 MG: 5 TABLET ORAL at 08:57

## 2019-02-10 RX ADMIN — OXYCODONE HYDROCHLORIDE 5 MG: 5 TABLET ORAL at 22:08

## 2019-02-10 RX ADMIN — INSULIN GLARGINE 20 UNITS: 100 INJECTION, SOLUTION SUBCUTANEOUS at 17:15

## 2019-02-10 RX ADMIN — SENNOSIDES AND DOCUSATE SODIUM 2 TABLET: 8.6; 5 TABLET ORAL at 05:00

## 2019-02-10 RX ADMIN — IPRATROPIUM BROMIDE 2 PUFF: 17 AEROSOL, METERED RESPIRATORY (INHALATION) at 17:19

## 2019-02-10 RX ADMIN — VARENICLINE TARTRATE 1 MG: 1 TABLET, FILM COATED ORAL at 09:00

## 2019-02-10 RX ADMIN — LEVOTHYROXINE SODIUM 75 MCG: 75 TABLET ORAL at 05:01

## 2019-02-10 RX ADMIN — BUDESONIDE AND FORMOTEROL FUMARATE DIHYDRATE 2 PUFF: 160; 4.5 AEROSOL RESPIRATORY (INHALATION) at 05:04

## 2019-02-10 RX ADMIN — INSULIN HUMAN 3 UNITS: 100 INJECTION, SOLUTION PARENTERAL at 12:32

## 2019-02-10 RX ADMIN — POLYETHYLENE GLYCOL 3350 1 PACKET: 17 POWDER, FOR SOLUTION ORAL at 12:46

## 2019-02-10 ASSESSMENT — ENCOUNTER SYMPTOMS
FEVER: 0
HEARTBURN: 0
VOMITING: 0
NAUSEA: 0
SORE THROAT: 0
BLURRED VISION: 0
WEAKNESS: 0
BACK PAIN: 0
HEADACHES: 0
PALPITATIONS: 0
NECK PAIN: 0
WHEEZING: 0
NERVOUS/ANXIOUS: 0
DIZZINESS: 0
CONSTIPATION: 0
ABDOMINAL PAIN: 0
DIARRHEA: 0
COUGH: 0
CHILLS: 0
SHORTNESS OF BREATH: 0

## 2019-02-10 NOTE — PROGRESS NOTES
Patient understands she needs to call prior to getting up, patient demonstrated effectively how to use the call bell, and walked with a walker to the bathroom. Pt had a hard time fitting with walker in the bathroom. Pt also understands and demonstrated correctly how to apply her boot on her Right foot.

## 2019-02-10 NOTE — CARE PLAN
Problem: Safety  Goal: Will remain free from falls  Outcome: PROGRESSING AS EXPECTED  Patient knows to call prior to getting up, patient is on a bed alarm. Patient ambulated with the walker to the bathroom, pt tolerated just fine, but cannot fit in the bathroom with the walker. Pt knows to use her boot when ambulating.     Problem: Pain Management  Goal: Pain level will decrease to patient's comfort goal  Outcome: PROGRESSING AS EXPECTED  Patient understands to ask for pain medication when she needs it. Pt demonstrates effectively how to use the call bell.

## 2019-02-10 NOTE — CARE PLAN
Problem: Communication  Goal: The ability to communicate needs accurately and effectively will improve  Outcome: PROGRESSING SLOWER THAN EXPECTED  Pt very anxious and up set about not receiving a suppository during day shift. Pt educated on bowel protocol.     Problem: Bowel/Gastric:  Goal: Normal bowel function is maintained or improved  Outcome: PROGRESSING AS EXPECTED  Pt had hard formed BM ion 2/9.

## 2019-02-10 NOTE — PROGRESS NOTES
[facility] Medicine Daily Progress Note    Date of Service  2/10/2019    Chief Complaint  68 y.o. female admitted 2/8/2019 with Fibula fracture    Hospital Course  Admitted with Fibula fracture after a fall, frequent falls occurring 3 times the past month.    Interval Problem Update  Fibula fracture - nonsurgical per orthopedic surgery, PT and OT evaluations done  Fall - already using a walker at home.  Hypertension - controlled  Diabetes - BS low 100s  Anemia - hgb trended down to 7.9    Consultants/Specialty  Orthopedic surgery    Code Status  Full    Disposition  Consult Rehab    Review of Systems  Review of Systems   Constitutional: Negative for chills, fever and malaise/fatigue.   HENT: Negative for hearing loss and sore throat.    Eyes: Negative for blurred vision.   Respiratory: Negative for cough, shortness of breath and wheezing.    Cardiovascular: Negative for chest pain, palpitations and leg swelling.   Gastrointestinal: Negative for abdominal pain, constipation, diarrhea, heartburn, nausea and vomiting.   Genitourinary: Negative for dysuria.   Musculoskeletal: Positive for joint pain. Negative for back pain and neck pain.   Skin: Negative for rash.   Neurological: Negative for dizziness, weakness and headaches.   Psychiatric/Behavioral: The patient is not nervous/anxious.         Physical Exam  Temp:  [36.2 °C (97.2 °F)-36.7 °C (98.1 °F)] 36.3 °C (97.3 °F)  Pulse:  [66-82] 77  Resp:  [17-18] 18  BP: (113-140)/(45-66) 140/45  SpO2:  [94 %-96 %] 94 %    Physical Exam   Constitutional: She is oriented to person, place, and time. She appears well-developed and well-nourished.   HENT:   Head: Normocephalic and atraumatic.   Eyes: Pupils are equal, round, and reactive to light. Conjunctivae are normal.   Neck: No tracheal deviation present. No thyromegaly present.   Cardiovascular: Normal rate and regular rhythm.    Pulmonary/Chest: Effort normal and breath sounds normal.   Abdominal: Soft. Bowel sounds are  normal. She exhibits no distension. There is no tenderness.   Musculoskeletal: She exhibits edema.   Hematoma Right ankle   Lymphadenopathy:     She has no cervical adenopathy.   Neurological: She is alert and oriented to person, place, and time.   Skin: Skin is warm and dry.   Nursing note and vitals reviewed.      Fluids    Intake/Output Summary (Last 24 hours) at 02/10/19 1045  Last data filed at 02/10/19 1022   Gross per 24 hour   Intake              240 ml   Output                0 ml   Net              240 ml       Laboratory  Recent Labs      02/08/19   1218  02/09/19   0127  02/10/19   0056   WBC  10.0  11.0*  10.6   RBC  2.74*  2.87*  2.51*   HEMOGLOBIN  8.8*  9.2*  7.9*   HEMATOCRIT  27.9*  29.3*  25.4*   MCV  101.8*  102.1*  101.2*   MCH  32.1  32.1  31.5   MCHC  31.5*  31.4*  31.1*   RDW  46.9  47.4  46.7   PLATELETCT  213  221  178   MPV  9.0  9.2  8.6*     Recent Labs      02/08/19   1218  02/09/19   0127  02/10/19   0056   SODIUM  138  134*  135   POTASSIUM  4.4  4.5  4.7   CHLORIDE  105  105  102   CO2  27  22  28   GLUCOSE  175*  148*  163*   BUN  31*  34*  36*   CREATININE  1.86*  1.68*  1.79*   CALCIUM  9.2  9.6  8.8     Recent Labs      02/08/19 1218   APTT  34.5   INR  1.09               Imaging  DX-FOOT-COMPLETE 3+ RIGHT   Final Result      Fracture of the distal fibula with soft tissue swelling along the lateral ankle and foot.      Calcaneal spurring.      Small ankle joint effusion.         DX-ANKLE 3+ VIEWS RIGHT   Final Result      Oblique minimally displaced fracture of the distal fibula with overlying soft tissue swelling.      Calcaneal spurring.      Ankle joint effusion.              Assessment/Plan  * Closed fracture of distal end of right fibula- (present on admission)   Assessment & Plan    Cam walker, WBAT  PT/OT     Frequent falls- (present on admission)   Assessment & Plan    PT/OT     Anemia associated with acute blood loss   Assessment & Plan    Follow cbc     Macrocytosis-  (present on admission)   Assessment & Plan    b12, folate, tsh wnl     Chronic hypertension- (present on admission)   Assessment & Plan    Amlodipine     Leukocytosis- (present on admission)   Assessment & Plan    Follow CBC     Type 2 diabetes mellitus with hyperglycemia, with long-term current use of insulin (HCC)- (present on admission)   Assessment & Plan    Lantus, SSI     Chronic respiratory failure (HCC)- (present on admission)   Assessment & Plan    Keep O2 sats above 92%  Encourage I-S, RT protocol     Chronic kidney disease, stage 3 (HCC)- (present on admission)   Assessment & Plan    Follow bmp     COPD (chronic obstructive pulmonary disease) (HCC)- (present on admission)   Assessment & Plan    Symbicort, RT protocol     Obesity (BMI 30-39.9)- (present on admission)   Assessment & Plan    Body mass index is 34.95 kg/m².      HLD (hyperlipidemia)- (present on admission)   Assessment & Plan    Rosuvastatin     Tobacco abuse disorder- (present on admission)   Assessment & Plan    Chantix     Chronic constipation- (present on admission)   Assessment & Plan    Bowel protocol     Hypothyroidism- (present on admission)   Assessment & Plan    Synthroid     Major depressive disorder (CMS-HCC)- (present on admission)   Assessment & Plan    Cymbalta          VTE prophylaxis: Lovenox

## 2019-02-10 NOTE — DISCHARGE PLANNING
PMR order received from Dr. Hernández.  SCP is shown for her medical provider. Admitted with Fibula fracture after a fall, frequent falls occurring 3 times the past month.  Cam walker and WBAT.  Priya does not meet CMS criteria.  Anticipate D/C to [facility] vs SNF if not able to return home once medically cleared.  This referral will not be forwarded to Physiatry.  Please re-consult for further review.  I do appreciate the referral.

## 2019-02-10 NOTE — CONSULTS
"Reason for PC Consult: Advance Care Planning    Assessment:  General:  68yF admitted to OBS on 19 with a nondisplaced right distal fibula fracture 2/2 mechanical GLF.  Patient stated that she tripped while wearing new shoes \"that have a lip\".  Ortho consult; non-surgical, recommends \"Cam walker and WBAT\".      Past Medical History COPD with chronic home O2, CA-PNA (), GLFs, pulmonary edema, diastolic heart failure, pulmonary HTN, CKD stage 3, NIDDM, obesity, EDITH, knee replacement, depression.      Social- Patient lives in [place], [place] alone with her bird.  Her only son is .  Her brother Uzair Zavala lives in [place], [place] and is her DPOA.  She has a friend Ree Denton who assists her PRN.  Current tobacco use, 50pk/yr.  No history of alcohol or drug use.    Dyspnea: No- 94% 4L/NC  Last BM: 19- Per RN assessment  Pain: Yes- Chronic with acute 2/2 fractured leg  Depression: Mood appropriate for situation-    Dementia: No;       Spiritual:  Is Uatsdin or spirituality important for coping with this illness? No-    Has a  or spiritual provider visit been requested? No    Palliative Performance Scale: 30%    Advance Directive: Advance Directive, DPOA-  Uzair Zavala brother. Statement of Desires 2,3 & 5.  DPOA: Yes- Uzair Zavala brother, [phone], [phone]   POLST: None on File-   Education provided.  Patient elected DNR with Selective treatment,  Document signed, scanned into Epic.  Original placed on patient's chart to be sent home with her on discharge.       Code Status: DNR- DNI- Confirmed at bedside with the patient. POLST completed    Outcome:  I met with the patient at bedside.  I introduced myself, role of Palliative Care in POC and reason for visit. Patient able to provide understanding of her past, current medical history and has no questions or concerns at this time.  She is requesting to complete a new Advance Directive.  AD reviewed, Statement of Desires selected, AMARI signed by " Dr. Hernández and zbigniew will be contacted.  Active listening, reflection, normalizing of feelings and support utilized throughout this encounter. All questions were answered in full, stated understanding and agreed. Patient/family/spouse denied further needs at this time.  Contact for Palliative Care was provided and patient/family is encourage to call for questions, concerns and/or support.      Plan: Continue current level of care. Disposition TBD- Patient remains on OBS may need SNF- follow up with SW/TY 2/11/19.  New AD for zbigniew 2/11/19.  Palliative Care will continue to monitor patient's EMR/POC and will continue communication with patient.   As appropriate, Palliative Care encourages medical team to engage in further conversation, education for patient/family at bedside regarding GOC/POC.   Recommendations: Hospice/Ethics referral is inappropriate at this time as the patient is actively seeking treatment.      Updated: Dr. Hernández     Thank you for allowing Palliative Care to participate in this patient's care.     Please call Palliative Care @ [phone] with any questions, concerns, updated plan of care or changes.

## 2019-02-11 LAB
ANION GAP SERPL CALC-SCNC: 6 MMOL/L (ref 0–11.9)
BASOPHILS # BLD AUTO: 0.4 % (ref 0–1.8)
BASOPHILS # BLD: 0.04 K/UL (ref 0–0.12)
BUN SERPL-MCNC: 30 MG/DL (ref 8–22)
CALCIUM SERPL-MCNC: 8.8 MG/DL (ref 8.5–10.5)
CHLORIDE SERPL-SCNC: 99 MMOL/L (ref 96–112)
CO2 SERPL-SCNC: 25 MMOL/L (ref 20–33)
CREAT SERPL-MCNC: 1.54 MG/DL (ref 0.5–1.4)
EOSINOPHIL # BLD AUTO: 0.22 K/UL (ref 0–0.51)
EOSINOPHIL NFR BLD: 2.2 % (ref 0–6.9)
ERYTHROCYTE [DISTWIDTH] IN BLOOD BY AUTOMATED COUNT: 45.6 FL (ref 35.9–50)
GLUCOSE BLD-MCNC: 120 MG/DL (ref 65–99)
GLUCOSE BLD-MCNC: 132 MG/DL (ref 65–99)
GLUCOSE BLD-MCNC: 132 MG/DL (ref 65–99)
GLUCOSE SERPL-MCNC: 108 MG/DL (ref 65–99)
HCT VFR BLD AUTO: 26.3 % (ref 37–47)
HGB BLD-MCNC: 8.4 G/DL (ref 12–16)
IMM GRANULOCYTES # BLD AUTO: 0.06 K/UL (ref 0–0.11)
IMM GRANULOCYTES NFR BLD AUTO: 0.6 % (ref 0–0.9)
LYMPHOCYTES # BLD AUTO: 2.05 K/UL (ref 1–4.8)
LYMPHOCYTES NFR BLD: 20.7 % (ref 22–41)
MCH RBC QN AUTO: 31.5 PG (ref 27–33)
MCHC RBC AUTO-ENTMCNC: 31.9 G/DL (ref 33.6–35)
MCV RBC AUTO: 98.5 FL (ref 81.4–97.8)
MONOCYTES # BLD AUTO: 0.79 K/UL (ref 0–0.85)
MONOCYTES NFR BLD AUTO: 8 % (ref 0–13.4)
NEUTROPHILS # BLD AUTO: 6.73 K/UL (ref 2–7.15)
NEUTROPHILS NFR BLD: 68.1 % (ref 44–72)
NRBC # BLD AUTO: 0 K/UL
NRBC BLD-RTO: 0 /100 WBC
PLATELET # BLD AUTO: 226 K/UL (ref 164–446)
PMV BLD AUTO: 9 FL (ref 9–12.9)
POTASSIUM SERPL-SCNC: 5 MMOL/L (ref 3.6–5.5)
RBC # BLD AUTO: 2.67 M/UL (ref 4.2–5.4)
SODIUM SERPL-SCNC: 130 MMOL/L (ref 135–145)
WBC # BLD AUTO: 9.9 K/UL (ref 4.8–10.8)

## 2019-02-11 PROCEDURE — 82962 GLUCOSE BLOOD TEST: CPT | Mod: 91

## 2019-02-11 PROCEDURE — 36415 COLL VENOUS BLD VENIPUNCTURE: CPT

## 2019-02-11 PROCEDURE — A9270 NON-COVERED ITEM OR SERVICE: HCPCS | Performed by: HOSPITALIST

## 2019-02-11 PROCEDURE — A9270 NON-COVERED ITEM OR SERVICE: HCPCS | Performed by: FAMILY MEDICINE

## 2019-02-11 PROCEDURE — G0378 HOSPITAL OBSERVATION PER HR: HCPCS

## 2019-02-11 PROCEDURE — 85025 COMPLETE CBC W/AUTO DIFF WBC: CPT

## 2019-02-11 PROCEDURE — 99225 PR SUBSEQUENT OBSERVATION CARE,LEVEL II: CPT | Performed by: FAMILY MEDICINE

## 2019-02-11 PROCEDURE — 80048 BASIC METABOLIC PNL TOTAL CA: CPT

## 2019-02-11 PROCEDURE — 700102 HCHG RX REV CODE 250 W/ 637 OVERRIDE(OP): Performed by: HOSPITALIST

## 2019-02-11 PROCEDURE — 700102 HCHG RX REV CODE 250 W/ 637 OVERRIDE(OP): Performed by: FAMILY MEDICINE

## 2019-02-11 PROCEDURE — 96372 THER/PROPH/DIAG INJ SC/IM: CPT

## 2019-02-11 RX ADMIN — OXYCODONE HYDROCHLORIDE 5 MG: 5 TABLET ORAL at 23:44

## 2019-02-11 RX ADMIN — IPRATROPIUM BROMIDE 2 PUFF: 17 AEROSOL, METERED RESPIRATORY (INHALATION) at 12:16

## 2019-02-11 RX ADMIN — IPRATROPIUM BROMIDE 2 PUFF: 17 AEROSOL, METERED RESPIRATORY (INHALATION) at 05:11

## 2019-02-11 RX ADMIN — BENZOCAINE AND MENTHOL 1 LOZENGE: 15; 3.6 LOZENGE ORAL at 20:17

## 2019-02-11 RX ADMIN — OXYCODONE HYDROCHLORIDE 5 MG: 5 TABLET ORAL at 09:50

## 2019-02-11 RX ADMIN — IPRATROPIUM BROMIDE 2 PUFF: 17 AEROSOL, METERED RESPIRATORY (INHALATION) at 17:14

## 2019-02-11 RX ADMIN — OXYCODONE HYDROCHLORIDE 5 MG: 5 TABLET ORAL at 17:44

## 2019-02-11 RX ADMIN — BENZOCAINE AND MENTHOL 1 LOZENGE: 15; 3.6 LOZENGE ORAL at 23:44

## 2019-02-11 RX ADMIN — DULOXETINE HYDROCHLORIDE 60 MG: 30 CAPSULE, DELAYED RELEASE ORAL at 05:10

## 2019-02-11 RX ADMIN — BUDESONIDE AND FORMOTEROL FUMARATE DIHYDRATE 2 PUFF: 160; 4.5 AEROSOL RESPIRATORY (INHALATION) at 05:11

## 2019-02-11 RX ADMIN — CYCLOBENZAPRINE 10 MG: 10 TABLET, FILM COATED ORAL at 20:08

## 2019-02-11 RX ADMIN — AMLODIPINE BESYLATE 10 MG: 10 TABLET ORAL at 05:10

## 2019-02-11 RX ADMIN — OXYCODONE HYDROCHLORIDE 5 MG: 5 TABLET ORAL at 14:30

## 2019-02-11 RX ADMIN — OXYCODONE HYDROCHLORIDE 5 MG: 5 TABLET ORAL at 03:54

## 2019-02-11 RX ADMIN — CYCLOBENZAPRINE 10 MG: 10 TABLET, FILM COATED ORAL at 23:44

## 2019-02-11 RX ADMIN — TRAZODONE HYDROCHLORIDE 150 MG: 50 TABLET ORAL at 20:08

## 2019-02-11 RX ADMIN — BUDESONIDE AND FORMOTEROL FUMARATE DIHYDRATE 2 PUFF: 160; 4.5 AEROSOL RESPIRATORY (INHALATION) at 17:13

## 2019-02-11 RX ADMIN — PATIROMER 8.4 G: 8.4 POWDER, FOR SUSPENSION ORAL at 01:41

## 2019-02-11 RX ADMIN — VARENICLINE TARTRATE 1 MG: 1 TABLET, FILM COATED ORAL at 08:00

## 2019-02-11 RX ADMIN — LEVOTHYROXINE SODIUM 75 MCG: 75 TABLET ORAL at 05:10

## 2019-02-11 RX ADMIN — CYCLOBENZAPRINE 10 MG: 10 TABLET, FILM COATED ORAL at 09:53

## 2019-02-11 RX ADMIN — ROSUVASTATIN CALCIUM 10 MG: 10 TABLET, FILM COATED ORAL at 17:13

## 2019-02-11 RX ADMIN — SENNOSIDES AND DOCUSATE SODIUM 2 TABLET: 8.6; 5 TABLET ORAL at 05:10

## 2019-02-11 RX ADMIN — VARENICLINE TARTRATE 1 MG: 1 TABLET, FILM COATED ORAL at 18:00

## 2019-02-11 RX ADMIN — INSULIN GLARGINE 20 UNITS: 100 INJECTION, SOLUTION SUBCUTANEOUS at 17:19

## 2019-02-11 ASSESSMENT — ENCOUNTER SYMPTOMS
NECK PAIN: 0
DIZZINESS: 0
CHILLS: 0
BLURRED VISION: 0
COUGH: 0
DIARRHEA: 0
CONSTIPATION: 0
VOMITING: 0
BACK PAIN: 0
WEAKNESS: 0
WHEEZING: 0
SHORTNESS OF BREATH: 0
HEADACHES: 0
HEARTBURN: 0
ABDOMINAL PAIN: 0
SORE THROAT: 0
FEVER: 0
NERVOUS/ANXIOUS: 0
PALPITATIONS: 0
NAUSEA: 0

## 2019-02-11 NOTE — PROGRESS NOTES
Assumed care of pt after receiving report from dayshift RN. Bedside rounding completed w/ walter RN. Pt resting in bed A&OX4, pt medicated for pain per mar, pt has closed fx of fubula in RLE, pt up WBAT and cam walker per ortho recc. Fall measures in place, bed alarm is on, call light within reach, personal belongings nearby, bed in lowest position, and hourly rounding in place. Will continue to monitor pt.

## 2019-02-11 NOTE — PROGRESS NOTES
[facility] Medicine Daily Progress Note    Date of Service  2/11/2019    Chief Complaint  68 y.o. female admitted 2/8/2019 with Fibula fracture    Hospital Course  Admitted with Fibula fracture after a fall, frequent falls occurring 3 times the past month.    Interval Problem Update  Fibula fracture - nonsurgical per orthopedic surgery, PT and OT evaluations done, less hematoma and swelling  Fall - already using a walker at home.  Hypertension - controlled  Diabetes - BS low 100s  Anemia - hgb 8.4    Consultants/Specialty  Orthopedic surgery    Code Status  Full    Disposition  SNF?    Review of Systems  Review of Systems   Constitutional: Negative for chills, fever and malaise/fatigue.   HENT: Negative for hearing loss and sore throat.    Eyes: Negative for blurred vision.   Respiratory: Negative for cough, shortness of breath and wheezing.    Cardiovascular: Negative for chest pain, palpitations and leg swelling.   Gastrointestinal: Negative for abdominal pain, constipation, diarrhea, heartburn, nausea and vomiting.   Genitourinary: Negative for dysuria.   Musculoskeletal: Positive for joint pain. Negative for back pain and neck pain.   Skin: Negative for rash.   Neurological: Negative for dizziness, weakness and headaches.   Psychiatric/Behavioral: The patient is not nervous/anxious.         Physical Exam  Temp:  [36.3 °C (97.3 °F)-36.7 °C (98.1 °F)] 36.4 °C (97.5 °F)  Pulse:  [60-73] 73  Resp:  [16-18] 17  BP: (121-145)/(45-65) 140/65  SpO2:  [93 %-97 %] 95 %    Physical Exam   Constitutional: She is oriented to person, place, and time. She appears well-developed and well-nourished.   HENT:   Head: Normocephalic and atraumatic.   Eyes: Pupils are equal, round, and reactive to light. Conjunctivae are normal.   Neck: No tracheal deviation present. No thyromegaly present.   Cardiovascular: Normal rate and regular rhythm.    Pulmonary/Chest: Effort normal and breath sounds normal.   Abdominal: Soft. Bowel sounds are  normal. She exhibits no distension. There is no tenderness.   Musculoskeletal: She exhibits edema.   Hematoma Right ankle   Lymphadenopathy:     She has no cervical adenopathy.   Neurological: She is alert and oriented to person, place, and time.   Skin: Skin is warm and dry.   Nursing note and vitals reviewed.      Fluids    Intake/Output Summary (Last 24 hours) at 02/11/19 1015  Last data filed at 02/11/19 0612   Gross per 24 hour   Intake              240 ml   Output              200 ml   Net               40 ml       Laboratory  Recent Labs      02/09/19   0127  02/10/19   0056  02/11/19   0058   WBC  11.0*  10.6  9.9   RBC  2.87*  2.51*  2.67*   HEMOGLOBIN  9.2*  7.9*  8.4*   HEMATOCRIT  29.3*  25.4*  26.3*   MCV  102.1*  101.2*  98.5*   MCH  32.1  31.5  31.5   MCHC  31.4*  31.1*  31.9*   RDW  47.4  46.7  45.6   PLATELETCT  221  178  226   MPV  9.2  8.6*  9.0     Recent Labs      02/09/19   0127  02/10/19   0056  02/11/19   0058   SODIUM  134*  135  130*   POTASSIUM  4.5  4.7  5.0   CHLORIDE  105  102  99   CO2  22  28  25   GLUCOSE  148*  163*  108*   BUN  34*  36*  30*   CREATININE  1.68*  1.79*  1.54*   CALCIUM  9.6  8.8  8.8     Recent Labs      02/08/19   1218   APTT  34.5   INR  1.09               Imaging  DX-FOOT-COMPLETE 3+ RIGHT   Final Result      Fracture of the distal fibula with soft tissue swelling along the lateral ankle and foot.      Calcaneal spurring.      Small ankle joint effusion.         DX-ANKLE 3+ VIEWS RIGHT   Final Result      Oblique minimally displaced fracture of the distal fibula with overlying soft tissue swelling.      Calcaneal spurring.      Ankle joint effusion.              Assessment/Plan  * Closed fracture of distal end of right fibula- (present on admission)   Assessment & Plan    Cam walker, WBAT  PT/OT     Frequent falls- (present on admission)   Assessment & Plan    PT/OT     Anemia associated with acute blood loss   Assessment & Plan    Follow cbc     Macrocytosis-  (present on admission)   Assessment & Plan    b12, folate, tsh wnl     Chronic hypertension- (present on admission)   Assessment & Plan    Amlodipine     Leukocytosis- (present on admission)   Assessment & Plan    Follow CBC     Type 2 diabetes mellitus with hyperglycemia, with long-term current use of insulin (HCC)- (present on admission)   Assessment & Plan    Lantus, SSI     Chronic respiratory failure (HCC)- (present on admission)   Assessment & Plan    Keep O2 sats above 92%  Encourage I-S, RT protocol     Chronic kidney disease, stage 3 (HCC)- (present on admission)   Assessment & Plan    Follow bmp     COPD (chronic obstructive pulmonary disease) (HCC)- (present on admission)   Assessment & Plan    Symbicort, RT protocol     Obesity (BMI 30-39.9)- (present on admission)   Assessment & Plan    Body mass index is 34.95 kg/m².      HLD (hyperlipidemia)- (present on admission)   Assessment & Plan    Rosuvastatin     Tobacco abuse disorder- (present on admission)   Assessment & Plan    Chantix     Chronic constipation- (present on admission)   Assessment & Plan    Bowel protocol     Hypothyroidism- (present on admission)   Assessment & Plan    Synthroid     Major depressive disorder (CMS-HCC)- (present on admission)   Assessment & Plan    Cymbalta          VTE prophylaxis: Lovenox

## 2019-02-11 NOTE — CARE PLAN
Problem: Safety  Goal: Will remain free from falls    Intervention: Assess risk factors for falls  Pt is high risk to fall.  Compliant with boot use and WBAT.  Calls appropriately for assistance, bed alarm in place.

## 2019-02-11 NOTE — CARE PLAN
Problem: Safety  Goal: Will remain free from falls    Intervention: Assess risk factors for falls  Fall measures in place. Bed alarm is on, call light within reach, personal belongings close-by, bed in lowest position, IV pole on same side of bathroom, upper bed rails up, hourly rounding in place. Will continue to monitor pt for safety.       Problem: Mobility  Goal: Risk for activity intolerance will decrease    Intervention: Assess and monitor signs of activity intolerance  Pt has closed fx of RLE fibula, pt up to AB w/ 1 assist WBAT on RLE w/ cam walker, tolerating well.

## 2019-02-11 NOTE — PROGRESS NOTES
Bedside report received. Assumed care of pt.  Pt able to make needs known.  Pt shows no s/s of distress.  Resting comfortably in bed.  Fall precautions in place, call light and belongings within reach. Plan of care discussed, all questions and concerns answered.  Hourly rounding in place.

## 2019-02-12 LAB
ANION GAP SERPL CALC-SCNC: 7 MMOL/L (ref 0–11.9)
BASOPHILS # BLD AUTO: 0.5 % (ref 0–1.8)
BASOPHILS # BLD: 0.05 K/UL (ref 0–0.12)
BUN SERPL-MCNC: 32 MG/DL (ref 8–22)
CALCIUM SERPL-MCNC: 8.8 MG/DL (ref 8.5–10.5)
CHLORIDE SERPL-SCNC: 101 MMOL/L (ref 96–112)
CO2 SERPL-SCNC: 29 MMOL/L (ref 20–33)
CREAT SERPL-MCNC: 1.7 MG/DL (ref 0.5–1.4)
EOSINOPHIL # BLD AUTO: 0.19 K/UL (ref 0–0.51)
EOSINOPHIL NFR BLD: 2 % (ref 0–6.9)
ERYTHROCYTE [DISTWIDTH] IN BLOOD BY AUTOMATED COUNT: 46.5 FL (ref 35.9–50)
GLUCOSE BLD-MCNC: 114 MG/DL (ref 65–99)
GLUCOSE BLD-MCNC: 140 MG/DL (ref 65–99)
GLUCOSE BLD-MCNC: 140 MG/DL (ref 65–99)
GLUCOSE BLD-MCNC: 189 MG/DL (ref 65–99)
GLUCOSE BLD-MCNC: 207 MG/DL (ref 65–99)
GLUCOSE SERPL-MCNC: 89 MG/DL (ref 65–99)
HCT VFR BLD AUTO: 27.8 % (ref 37–47)
HGB BLD-MCNC: 9 G/DL (ref 12–16)
IMM GRANULOCYTES # BLD AUTO: 0.07 K/UL (ref 0–0.11)
IMM GRANULOCYTES NFR BLD AUTO: 0.7 % (ref 0–0.9)
LYMPHOCYTES # BLD AUTO: 1.83 K/UL (ref 1–4.8)
LYMPHOCYTES NFR BLD: 19 % (ref 22–41)
MCH RBC QN AUTO: 32.4 PG (ref 27–33)
MCHC RBC AUTO-ENTMCNC: 32.4 G/DL (ref 33.6–35)
MCV RBC AUTO: 100 FL (ref 81.4–97.8)
MONOCYTES # BLD AUTO: 0.79 K/UL (ref 0–0.85)
MONOCYTES NFR BLD AUTO: 8.2 % (ref 0–13.4)
NEUTROPHILS # BLD AUTO: 6.69 K/UL (ref 2–7.15)
NEUTROPHILS NFR BLD: 69.6 % (ref 44–72)
NRBC # BLD AUTO: 0 K/UL
NRBC BLD-RTO: 0 /100 WBC
PLATELET # BLD AUTO: 234 K/UL (ref 164–446)
PMV BLD AUTO: 8.8 FL (ref 9–12.9)
POTASSIUM SERPL-SCNC: 5.1 MMOL/L (ref 3.6–5.5)
RBC # BLD AUTO: 2.78 M/UL (ref 4.2–5.4)
SODIUM SERPL-SCNC: 137 MMOL/L (ref 135–145)
WBC # BLD AUTO: 9.6 K/UL (ref 4.8–10.8)

## 2019-02-12 PROCEDURE — 700102 HCHG RX REV CODE 250 W/ 637 OVERRIDE(OP): Performed by: HOSPITALIST

## 2019-02-12 PROCEDURE — 700102 HCHG RX REV CODE 250 W/ 637 OVERRIDE(OP): Performed by: FAMILY MEDICINE

## 2019-02-12 PROCEDURE — A9270 NON-COVERED ITEM OR SERVICE: HCPCS | Performed by: FAMILY MEDICINE

## 2019-02-12 PROCEDURE — 99226 PR SUBSEQUENT OBSERVATION CARE,LEVEL III: CPT | Performed by: INTERNAL MEDICINE

## 2019-02-12 PROCEDURE — 36415 COLL VENOUS BLD VENIPUNCTURE: CPT

## 2019-02-12 PROCEDURE — 85025 COMPLETE CBC W/AUTO DIFF WBC: CPT

## 2019-02-12 PROCEDURE — G0378 HOSPITAL OBSERVATION PER HR: HCPCS

## 2019-02-12 PROCEDURE — 96372 THER/PROPH/DIAG INJ SC/IM: CPT

## 2019-02-12 PROCEDURE — 82962 GLUCOSE BLOOD TEST: CPT | Mod: 91

## 2019-02-12 PROCEDURE — 80048 BASIC METABOLIC PNL TOTAL CA: CPT

## 2019-02-12 PROCEDURE — A9270 NON-COVERED ITEM OR SERVICE: HCPCS | Performed by: HOSPITALIST

## 2019-02-12 RX ORDER — ACETAMINOPHEN 325 MG/1
650 TABLET ORAL EVERY 6 HOURS PRN
Qty: 30 TAB | Refills: 0 | Status: SHIPPED | OUTPATIENT
Start: 2019-02-12 | End: 2019-06-03

## 2019-02-12 RX ORDER — OXYCODONE HYDROCHLORIDE 5 MG/1
5 TABLET ORAL
Qty: 10 TAB | Refills: 0 | Status: SHIPPED | OUTPATIENT
Start: 2019-02-12 | End: 2019-02-14

## 2019-02-12 RX ADMIN — IPRATROPIUM BROMIDE 2 PUFF: 17 AEROSOL, METERED RESPIRATORY (INHALATION) at 06:04

## 2019-02-12 RX ADMIN — BUDESONIDE AND FORMOTEROL FUMARATE DIHYDRATE 2 PUFF: 160; 4.5 AEROSOL RESPIRATORY (INHALATION) at 06:04

## 2019-02-12 RX ADMIN — SENNOSIDES AND DOCUSATE SODIUM 2 TABLET: 8.6; 5 TABLET ORAL at 17:45

## 2019-02-12 RX ADMIN — OXYCODONE HYDROCHLORIDE 5 MG: 5 TABLET ORAL at 10:54

## 2019-02-12 RX ADMIN — BENZOCAINE AND MENTHOL 1 LOZENGE: 15; 3.6 LOZENGE ORAL at 17:46

## 2019-02-12 RX ADMIN — LEVOTHYROXINE SODIUM 75 MCG: 75 TABLET ORAL at 06:00

## 2019-02-12 RX ADMIN — VARENICLINE TARTRATE 1 MG: 1 TABLET, FILM COATED ORAL at 18:00

## 2019-02-12 RX ADMIN — OXYCODONE HYDROCHLORIDE 5 MG: 5 TABLET ORAL at 06:00

## 2019-02-12 RX ADMIN — TRAZODONE HYDROCHLORIDE 150 MG: 50 TABLET ORAL at 21:05

## 2019-02-12 RX ADMIN — ROSUVASTATIN CALCIUM 10 MG: 10 TABLET, FILM COATED ORAL at 18:00

## 2019-02-12 RX ADMIN — OXYCODONE HYDROCHLORIDE 5 MG: 5 TABLET ORAL at 21:05

## 2019-02-12 RX ADMIN — AMLODIPINE BESYLATE 10 MG: 10 TABLET ORAL at 06:00

## 2019-02-12 RX ADMIN — INSULIN GLARGINE 20 UNITS: 100 INJECTION, SOLUTION SUBCUTANEOUS at 17:46

## 2019-02-12 RX ADMIN — PATIROMER 8.4 G: 8.4 POWDER, FOR SUSPENSION ORAL at 02:45

## 2019-02-12 RX ADMIN — INSULIN HUMAN 3 UNITS: 100 INJECTION, SOLUTION PARENTERAL at 08:39

## 2019-02-12 RX ADMIN — IPRATROPIUM BROMIDE 2 PUFF: 17 AEROSOL, METERED RESPIRATORY (INHALATION) at 12:25

## 2019-02-12 RX ADMIN — OXYCODONE HYDROCHLORIDE 5 MG: 5 TABLET ORAL at 17:51

## 2019-02-12 RX ADMIN — SENNOSIDES AND DOCUSATE SODIUM 1 TABLET: 8.6; 5 TABLET ORAL at 06:02

## 2019-02-12 RX ADMIN — OXYCODONE HYDROCHLORIDE 5 MG: 5 TABLET ORAL at 14:18

## 2019-02-12 RX ADMIN — DULOXETINE HYDROCHLORIDE 60 MG: 30 CAPSULE, DELAYED RELEASE ORAL at 06:01

## 2019-02-12 RX ADMIN — BUDESONIDE AND FORMOTEROL FUMARATE DIHYDRATE 2 PUFF: 160; 4.5 AEROSOL RESPIRATORY (INHALATION) at 17:46

## 2019-02-12 RX ADMIN — INSULIN HUMAN 2 UNITS: 100 INJECTION, SOLUTION PARENTERAL at 21:05

## 2019-02-12 RX ADMIN — IPRATROPIUM BROMIDE 2 PUFF: 17 AEROSOL, METERED RESPIRATORY (INHALATION) at 17:46

## 2019-02-12 RX ADMIN — VARENICLINE TARTRATE 1 MG: 1 TABLET, FILM COATED ORAL at 08:30

## 2019-02-12 ASSESSMENT — ENCOUNTER SYMPTOMS
CHILLS: 0
DEPRESSION: 0
NAUSEA: 0
PALPITATIONS: 0
FOCAL WEAKNESS: 0
MYALGIAS: 1
WEAKNESS: 0
NERVOUS/ANXIOUS: 0
HEARTBURN: 0
BACK PAIN: 0
NECK PAIN: 0
CONSTIPATION: 0
ABDOMINAL PAIN: 0
SHORTNESS OF BREATH: 0
DIAPHORESIS: 0
FEVER: 0
HEADACHES: 0

## 2019-02-12 NOTE — DISCHARGE PLANNING
Skilled Nursing co-pays for patient:    Day 1-20, $20 a day  Day 21-34, $150 a day  Day , $0 a day

## 2019-02-12 NOTE — CARE PLAN
Problem: Venous Thromboembolism (VTW)/Deep Vein Thrombosis (DVT) Prevention:  Goal: Patient will participate in Venous Thrombosis (VTE)/Deep Vein Thrombosis (DVT)Prevention Measures  Lovenox subcutaneous ordered for VTE prophylaxis. Pt has refused VTE prophylaxis since admit. Educated pt on need to accept Lovenox as pt is at risk for DVT. Pt refuses Lovenox. Encouraged pt to ambulate as SCDs cause pain to fracture in RLE.

## 2019-02-12 NOTE — DISCHARGE SUMMARY
Discharge Summary    CHIEF COMPLAINT ON ADMISSION  Chief Complaint   Patient presents with   • GLF   • Ankle Pain       Reason for Admission  EMS     CODE STATUS  DNAR/DNI    HPI & HOSPITAL COURSE  This is a 68 y.o. female here with right distal fibula fracture, orthopedic surgery was consulted on the case, it was deemed to be nonsurgical, recommendation was placement of Cam walker, weightbearing as tolerated.  Physical and occupational therapy came to evaluate the patient and she would continue to require rehabilitation.  Patient has fallen down 3 times over the past month even while using a walker.  Her chronic medical problems were not really acute issues during her hospital stay.    She does appear to have underlying CKD, stage 3 which appears to be at baseline.  She has been encouraged to oral fluid hydration.       Therefore, she is discharged in good and stable condition to skilled nursing facility.    The patient recovered much more quickly than anticipated on admission.      FOLLOW UP ITEMS POST DISCHARGE  Follow with orthopedic surgery in 10 days    DISCHARGE DIAGNOSES  Principal Problem:    Closed fracture of distal end of right fibula POA: Yes  Active Problems:    Frequent falls POA: Yes    COPD (chronic obstructive pulmonary disease) (HCC) (Chronic) POA: Yes      Overview: On oxygen 3L nocturnal    Chronic kidney disease, stage 3 (HCC) POA: Yes    Chronic respiratory failure (HCC) POA: Yes    Type 2 diabetes mellitus with hyperglycemia, with long-term current use of insulin (HCC) POA: Yes    Leukocytosis POA: Yes    Chronic hypertension (Chronic) POA: Yes    Macrocytosis POA: Yes    Anemia associated with acute blood loss POA: No    Major depressive disorder (CMS-HCC) (Chronic) POA: Yes    Hypothyroidism (Chronic) POA: Yes    Chronic constipation POA: Yes    Tobacco abuse disorder POA: Yes    HLD (hyperlipidemia) (Chronic) POA: Yes    Obesity (BMI 30-39.9) POA: Yes  Resolved Problems:    * No resolved  hospital problems. *      FOLLOW UP  Future Appointments  Date Time Provider Department Center   4/4/2019 1:00 PM MARLO Huang [facility]     [facility] ([facility])  [address]  [address]  [place] [address]  [phone]          MEDICATIONS ON DISCHARGE     Medication List      START taking these medications      Instructions   acetaminophen 325 MG Tabs  Commonly known as:  TYLENOL   Take 2 Tabs by mouth every 6 hours as needed (Mild Pain; (Pain scale 1-3); Temp greater than 100.5 F).  Dose:  650 mg        CHANGE how you take these medications      Instructions   oxyCODONE immediate-release 5 MG Tabs  What changed:  · medication strength  · how much to take  · when to take this  · reasons to take this  Commonly known as:  ROXICODONE   Take 1 Tab by mouth every 3 hours as needed for up to 3 days.  Dose:  5 mg        CONTINUE taking these medications      Instructions   amLODIPine 10 MG Tabs  Commonly known as:  NORVASC   Take 1 Tab by mouth every day.  Dose:  10 mg     ANTIOXIDANTS PO   Take 1 Tab by mouth every day.  Dose:  1 Tab     cyclobenzaprine 5 MG tablet  Commonly known as:  FLEXERIL   Take 1-2 Tabs by mouth 3 times a day as needed.  Dose:  5-10 mg     Diclofenac Sodium 1 % Gel   Apply 2 g to skin as directed 2 times a day as needed.  Dose:  2 g     DULoxetine 60 MG Cpep delayed-release capsule  Commonly known as:  CYMBALTA   TAKE 1 CAPSULE BY MOUTH DAILY     ipratropium 17 MCG/ACT Aers  Commonly known as:  ATROVENT   Doctor's comments:  Instead of spiriva  Inhale 2 Puffs by mouth every 6 hours.  Dose:  2 Puff     lactulose 10 GM/15ML Soln   TAKE 30 MLS (6 TEASPOONFULS) BY MOUTH TWO TIMES DAILY     LANTUS SOLOSTAR 100 UNIT/ML Sopn injection  Generic drug:  insulin glargine   Inject 20 Units as instructed every evening.  Dose:  20 Units     levothyroxine 75 MCG Tabs  Commonly known as:  SYNTHROID   TAKE 1 TABLET BY MOUTH DAILY     patiromer 8.4 g Pack  Commonly known as:  " VELTASSA   Take 8.4 g by mouth every day.  Dose:  8.4 g     rosuvastatin 10 MG Tabs  Commonly known as:  CRESTOR   Take 1 Tab by mouth every evening.  Dose:  10 mg     SYMBICORT 160-4.5 MCG/ACT Aero  Generic drug:  budesonide-formoterol   INHALE 2 PUFFS BY MOUTH TWO TIMES DAILY     traZODone 50 MG Tabs  Commonly known as:  DESYREL   Take 150 mg by mouth every evening.  Dose:  150 mg     varenicline 1 MG tablet  Commonly known as:  CHANTIX CONTINUING MONTH LUANN   Take 1 Tab by mouth 2 times a day.  Dose:  1 mg        STOP taking these medications    amitriptyline 50 MG Tabs  Commonly known as:  ELAVIL     clobetasol 0.05 % Crea  Commonly known as:  TEMOVATE     clotrimazole-betamethasone 1-0.05 % Crea  Commonly known as:  LOTRISONE     nystatin/triamcinolone 190535-7.1 UNIT/GM-% Crea  Commonly known as:  MYCOLOG            Allergies  Allergies   Allergen Reactions   • Vitamin C Diarrhea     \"violent diarrhea\"   • Keflex Rash     Severe rash, but TOLERATES PENICILLINS, Rocephin    • Codeine Nausea     Severe stomach pain   • Gabapentin Palpitations     Gets shaky and falls    • Lyrica Nausea     Nausea, dizzy   • Sudafed Nausea and Unspecified     Chest pain, nausea       DIET  Orders Placed This Encounter   Procedures   • Diet Order Diabetic     Standing Status:   Standing     Number of Occurrences:   1     Order Specific Question:   Diet:     Answer:   Diabetic [3]     Order Specific Question:   Electrolyte modifications:     Answer:   Low Potassium [3]       ACTIVITY  As tolerated.  Weight bearing as tolerated    LINES, DRAINS, AND WOUNDS  This is an automated list. Peripheral IVs will be removed prior to discharge.  Peripheral IV 02/08/19 20 G Right Antecubital (Active)   Site Assessment Clean;Dry;Intact 2/11/2019  8:22 AM   Dressing Type Transparent 2/11/2019  8:22 AM   Line Status Saline locked 2/11/2019  8:22 AM   Dressing Status Clean;Dry;Intact 2/11/2019  8:22 AM   Dressing Intervention N/A 2/11/2019  8:22 AM "   Infiltration Grading ([facility], CV) 0 2/11/2019  8:22 AM   Phlebitis Scale ([facility] Only) 0 2/11/2019  8:22 AM          Peripheral IV 02/08/19 20 G Right Antecubital (Active)   Site Assessment Clean;Dry;Intact 2/11/2019  8:22 AM   Dressing Type Transparent 2/11/2019  8:22 AM   Line Status Saline locked 2/11/2019  8:22 AM   Dressing Status Clean;Dry;Intact 2/11/2019  8:22 AM   Dressing Intervention N/A 2/11/2019  8:22 AM   Infiltration Grading ([facility], CV) 0 2/11/2019  8:22 AM   [facility]lebitis Scale ([facility]) 0 2/11/2019  8:22 AM               MENTAL STATUS ON TRANSFER  Level of Consciousness: Alert  Orientation : Oriented x 4  Speech: Speech Clear    CONSULTATIONS  Orthopedic Surgery - [place]    PROCEDURES  None    LABORATORY  Lab Results   Component Value Date    SODIUM 136 02/14/2019    POTASSIUM 4.5 02/14/2019    CHLORIDE 103 02/14/2019    CO2 24 02/14/2019    GLUCOSE 120 (H) 02/14/2019    BUN 31 (H) 02/14/2019    CREATININE 1.63 (H) 02/14/2019        Lab Results   Component Value Date    WBC 8.0 02/14/2019    HEMOGLOBIN 8.3 (L) 02/14/2019    HEMATOCRIT 26.7 (L) 02/14/2019    PLATELETCT 247 02/14/2019        Total time of the discharge process exceeds 40 minutes.    2/14 ADDendum: Pt has been working with PT/OT.  She is now requiring minimal assistance.  She has been cleared for discharge to home with home health.    In prescribing controlled substances to this patient, I certify that I have obtained and reviewed the medical history of Priya Callahan. I have also made a good lisa effort to obtain applicable records from other providers who have treated the patient and no other records are available at this time.     I have conducted a physical exam and documented it. I have reviewed Ms. Callahan’s prescription history as maintained by the [place] Prescription Monitoring Program.     I have assessed the patient’s risk for abuse, dependency, and addiction using the validated Opioid Risk Tool available at  https://www.[employer].com/ftrkfu-gxke-wrve-ort-narcotic-abuse.     Given the above, I believe the benefits of controlled substance therapy outweigh the risks. The reasons for prescribing controlled substances include non-narcotic, oral analgesic alternatives have been inadequate for pain control. Accordingly, I have discussed the risk and benefits, treatment plan, and alternative therapies with the patient.

## 2019-02-12 NOTE — PROGRESS NOTES
[facility] Medicine Daily Progress Note    Date of Service  2/12/2019    Chief Complaint  68 y.o. female admitted 2/8/2019 with Fibula fracture    Hospital Course  Admitted with Fibula fracture after a fall, frequent falls occurring 3 times the past month.    Interval Problem Update  Fibula fracture - nonsurgical per orthopedic surgery, PT and OT evaluations done, less hematoma and swelling  Fall - already using a walker at home.  - reports generalized pain, controlled  Tolerating oral intake, denies dysuria    Consultants/Specialty  Orthopedic surgery    Code Status  Full    Disposition  SNF vs home with home health, or home assistance  SW to assist  OOB tid    Review of Systems  Review of Systems   Constitutional: Negative for chills, diaphoresis and fever.   HENT: Negative for hearing loss.    Respiratory: Negative for shortness of breath.    Cardiovascular: Negative for chest pain, palpitations and leg swelling.   Gastrointestinal: Negative for abdominal pain, constipation, heartburn and nausea.   Genitourinary: Negative for dysuria.   Musculoskeletal: Positive for joint pain and myalgias. Negative for back pain and neck pain.   Neurological: Negative for focal weakness, weakness and headaches.   Psychiatric/Behavioral: Negative for depression. The patient is not nervous/anxious.         Physical Exam  Temp:  [36.1 °C (97 °F)-37.2 °C (98.9 °F)] 37.2 °C (98.9 °F)  Pulse:  [62-76] 76  Resp:  [17-19] 17  BP: (117-154)/(50-68) 117/60  SpO2:  [91 %-96 %] 91 %    Physical Exam   Constitutional: She is oriented to person, place, and time. She appears well-developed and well-nourished. No distress.   HENT:   Head: Normocephalic and atraumatic.   Mouth/Throat: No oropharyngeal exudate.   Eyes: Pupils are equal, round, and reactive to light. Right eye exhibits no discharge. Left eye exhibits no discharge.   Neck: Normal range of motion. No thyromegaly present.   Cardiovascular: Normal rate, regular rhythm and intact distal  pulses.    Pulmonary/Chest: Effort normal. No respiratory distress.   Abdominal: Soft. Bowel sounds are normal. She exhibits no distension. There is no tenderness.   Musculoskeletal: She exhibits edema. She exhibits no tenderness.   Hematoma Right ankle   Neurological: She is alert and oriented to person, place, and time. No cranial nerve deficit. Coordination normal.   Skin: Skin is warm and dry.   Psychiatric: Her behavior is normal.   Nursing note and vitals reviewed.      Fluids    Intake/Output Summary (Last 24 hours) at 02/12/19 1339  Last data filed at 02/12/19 1013   Gross per 24 hour   Intake              860 ml   Output                0 ml   Net              860 ml       Laboratory  Recent Labs      02/10/19   0056  02/11/19 0058 02/12/19 0239   WBC  10.6  9.9  9.6   RBC  2.51*  2.67*  2.78*   HEMOGLOBIN  7.9*  8.4*  9.0*   HEMATOCRIT  25.4*  26.3*  27.8*   MCV  101.2*  98.5*  100.0*   MCH  31.5  31.5  32.4   MCHC  31.1*  31.9*  32.4*   RDW  46.7  45.6  46.5   PLATELETCT  178  226  234   MPV  8.6*  9.0  8.8*     Recent Labs      02/10/19   0056  02/11/19   0058  02/12/19   0239   SODIUM  135  130*  137   POTASSIUM  4.7  5.0  5.1   CHLORIDE  102  99  101   CO2  28  25  29   GLUCOSE  163*  108*  89   BUN  36*  30*  32*   CREATININE  1.79*  1.54*  1.70*   CALCIUM  8.8  8.8  8.8                   Imaging  DX-FOOT-COMPLETE 3+ RIGHT   Final Result      Fracture of the distal fibula with soft tissue swelling along the lateral ankle and foot.      Calcaneal spurring.      Small ankle joint effusion.         DX-ANKLE 3+ VIEWS RIGHT   Final Result      Oblique minimally displaced fracture of the distal fibula with overlying soft tissue swelling.      Calcaneal spurring.      Ankle joint effusion.              Assessment/Plan  * Closed fracture of distal end of right fibula- (present on admission)   Assessment & Plan    Cam walker, WBAT  PT/OT     Frequent falls- (present on admission)   Assessment & Plan     PT/OT    2/12 encourage activity  Patient now refusing snf placement  RN to cont therapy, OOB tid  ? Home with home health     Anemia associated with acute blood loss   Assessment & Plan    H/h stable     Macrocytosis- (present on admission)   Assessment & Plan    b12, folate, tsh wnl     Chronic hypertension- (present on admission)   Assessment & Plan    Amlodipine     Leukocytosis- (present on admission)   Assessment & Plan    Follow CBC     Type 2 diabetes mellitus with hyperglycemia, with long-term current use of insulin ([facility])- (present on admission)   Assessment & Plan    Lantus, SSI     Chronic respiratory failure ([facility])- (present on admission)   Assessment & Plan    Keep O2 sats above 92%  Encourage I-S, RT protocol     Chronic kidney disease, stage 3 ([facility])- (present on admission)   Assessment & Plan    With ARF  Monitor renal function, avoid nephrotoxic rx  - encourage oral fluid intake     COPD (chronic obstructive pulmonary disease) ([facility])- (present on admission)   Assessment & Plan    Symbicort, RT protocol     Obesity (BMI 30-39.9)- (present on admission)   Assessment & Plan    Body mass index is 34.95 kg/m².      HLD (hyperlipidemia)- (present on admission)   Assessment & Plan    Rosuvastatin     Tobacco abuse disorder- (present on admission)   Assessment & Plan    Chantix     Chronic constipation- (present on admission)   Assessment & Plan    Bowel protocol     Hypothyroidism- (present on admission)   Assessment & Plan    Synthroid     Major depressive disorder (CMS-[facility])- (present on admission)   Assessment & Plan    Cymbalta          VTE prophylaxis: Lovenox

## 2019-02-12 NOTE — CONSULTS
DATE OF SERVICE:  02/10/2019    REQUESTING PHYSICIAN:  Marcus Islas MD    CHIEF COMPLAINT:  Right ankle pain.    HISTORY:  The patient is 68 years old.  She has had previous left ankle   fracture treated with open reduction and internal fixation by Dr. Gtz.    She twisted her right ankle 2 days ago and injured her right ankle and was   found to have a distal fibula fracture, admitted to the hospital for failure   to thrive.  Orthopedic consultation was requested.    ALLERGIES:  VITAMIN C, CODEINE, KEFLEX, GABAPENTIN, LYRICA AND SUDAFED.    MEDICATIONS:  Cymbalta, diclofenac gel, Symbicort, amlodipine, amitriptyline,   clobetasol, Lotrisone cream, Flexeril, Lantus insulin, Atrovent, lactulose,   Synthroid, Mycolog cream, oxycodone, Crestor, Desyrel, and Chantix.    PAST MEDICAL HISTORY:  Arthritis, chronic back pain, CHF, diabetes, COPD,   hypothyroidism, GERD, hepatitis C, hypertension, and neuropathy.    PAST SURGICAL HISTORY:  Breast biopsy, left ankle ORIF, multiple back   injections.    SOCIAL HISTORY:  The patient smokes.  She does not drink alcohol or use drugs.    FAMILY HISTORY:  Positive for alcohol/drug use in mother, cancer in brother   and father, diabetes in brother and maternal grandmother, heart disease in   mother and paternal grandmother, lung disease in mother, stroke in paternal   grandmother.  This was taken from the medical history.    REVIEW OF SYSTEMS:  No loss of conscious, nausea, vomiting, diarrhea,   constipation, polyuria, dysuria, fevers, chills, weight loss, weight gain,   abdominal pain, chest pain or shortness of breath.    PHYSICAL EXAMINATION:  GENERAL:  The patient is in no acute distress.  VITAL SIGNS:  Her blood pressure is 140/45, heart rate 77, respirations 18,   temperature 97.3.  HEENT:  Normocephalic, atraumatic.  NECK:  Supple, nontender.  CHEST AND ABDOMEN:  Nontender.  No labored breathing.  EXTREMITIES:  Left lower and bilateral upper extremities without  tenderness or   deformity.  Right lower extremity shows moderate swelling and ecchymosis.    Skin is intact.  She is able to dorsiflex and plantarflex her toes.    LABORATORY DATA:  Include white blood cell count of 10,600, hematocrit 25.4%,   platelet count 178,000.  Sodium 135, potassium 4.7, creatinine 1.75.    Nonweightbearing radiographs of the right ankle show nondisplaced distal   fibula fracture.    ASSESSMENT:  1.  Right distal fibula fracture.  2.  Chronic anemia.  3.  Chronic renal insufficiency.  4.  Multiple medical problems/failure to thrive.    PLAN:  Nonoperative treatment of the right ankle.  She can be weightbearing as   tolerated in a Cam walker boot.  We will see her back in the clinic in   approximately 10 days for weightbearing radiographs of the right ankle to   confirm maintained alignment.       ____________________________________     MD SIRIA GROSSMAN / DMITRY    DD:  02/11/2019 14:04:49  DT:  02/11/2019 16:30:03    D#:  [id number]  Job#:  [id number]

## 2019-02-12 NOTE — CARE PLAN
Problem: Knowledge Deficit  Goal: Knowledge of disease process/condition, treatment plan, diagnostic tests, and medications will improve  Outcome: PROGRESSING AS EXPECTED  Pt updated on plan of care including pending acceptance and a SNF where she can receive therapy to increase strength.    Problem: Mobility  Goal: Risk for activity intolerance will decrease  Outcome: PROGRESSING AS EXPECTED  Pt encouraged to sit up in chair for meals and increase ambulation with walker.

## 2019-02-12 NOTE — DISCHARGE PLANNING
LSW met with pt at bedside to discuss co-pays. Pt stated that she could not afford to pay SNF co-pays. Discussed financial resources, none identified. RADHAW informed MD.

## 2019-02-12 NOTE — PROGRESS NOTES
Rec'd report from day shift RN. Assumed pt care. Assessment completed. AA&OX4. Reports pain to RLE, will medicate per MAR. No s/s of discomfort or distress. Pt can pivot transfer to BSC with SBA, wears walking boot to RLE when out of bed. Skin tears noted to left shin, right forearm, fingers, dressings placed to skin tears. Bed in lowest position, bed locked, RN and CNA numbers provided, call light within reach.

## 2019-02-12 NOTE — PROGRESS NOTES
Report received. Assumed care at 0700, assessment complete. Pt is A&O x 4. 5/10 pain at this time in right foot, through right leg and into lower back. Pt stating she does not need any other medication at this time. Patient instructed on the plan of care for the day. Bed in lowest position. Bed alarm on. Call light within reach. Patient provided RN and CNA extension.

## 2019-02-12 NOTE — DISCHARGE PLANNING
Agency/Facility Name: [facility]   Spoke To: Arik   Outcome: Patient accepted pending bed availability.    Agency/Facility Name: [facility]   Spoke To: Nguyen   Outcome: Referral currently under review.     Agency/Facility Name: [facility]   Outcome: Voicemail left for Cliff regarding the status of patient's referral.

## 2019-02-12 NOTE — CARE PLAN
Problem: Safety  Goal: Will remain free from injury    Intervention: Provide assistance with mobility  Pt refused the bed alarm, calls appropriately. Pt can pivot transfer to BSC with SBA.

## 2019-02-13 LAB
ANION GAP SERPL CALC-SCNC: 4 MMOL/L (ref 0–11.9)
BASOPHILS # BLD AUTO: 0.4 % (ref 0–1.8)
BASOPHILS # BLD: 0.03 K/UL (ref 0–0.12)
BUN SERPL-MCNC: 34 MG/DL (ref 8–22)
CALCIUM SERPL-MCNC: 8.2 MG/DL (ref 8.5–10.5)
CHLORIDE SERPL-SCNC: 102 MMOL/L (ref 96–112)
CO2 SERPL-SCNC: 29 MMOL/L (ref 20–33)
CREAT SERPL-MCNC: 1.69 MG/DL (ref 0.5–1.4)
EOSINOPHIL # BLD AUTO: 0.18 K/UL (ref 0–0.51)
EOSINOPHIL NFR BLD: 2.2 % (ref 0–6.9)
ERYTHROCYTE [DISTWIDTH] IN BLOOD BY AUTOMATED COUNT: 46 FL (ref 35.9–50)
GLUCOSE BLD-MCNC: 117 MG/DL (ref 65–99)
GLUCOSE BLD-MCNC: 137 MG/DL (ref 65–99)
GLUCOSE BLD-MCNC: 138 MG/DL (ref 65–99)
GLUCOSE BLD-MCNC: 167 MG/DL (ref 65–99)
GLUCOSE SERPL-MCNC: 104 MG/DL (ref 65–99)
HCT VFR BLD AUTO: 24.6 % (ref 37–47)
HGB BLD-MCNC: 7.8 G/DL (ref 12–16)
IMM GRANULOCYTES # BLD AUTO: 0.11 K/UL (ref 0–0.11)
IMM GRANULOCYTES NFR BLD AUTO: 1.4 % (ref 0–0.9)
LYMPHOCYTES # BLD AUTO: 1.16 K/UL (ref 1–4.8)
LYMPHOCYTES NFR BLD: 14.3 % (ref 22–41)
MCH RBC QN AUTO: 32 PG (ref 27–33)
MCHC RBC AUTO-ENTMCNC: 31.7 G/DL (ref 33.6–35)
MCV RBC AUTO: 100.8 FL (ref 81.4–97.8)
MONOCYTES # BLD AUTO: 0.89 K/UL (ref 0–0.85)
MONOCYTES NFR BLD AUTO: 11 % (ref 0–13.4)
NEUTROPHILS # BLD AUTO: 5.74 K/UL (ref 2–7.15)
NEUTROPHILS NFR BLD: 70.7 % (ref 44–72)
NRBC # BLD AUTO: 0 K/UL
NRBC BLD-RTO: 0 /100 WBC
PLATELET # BLD AUTO: 218 K/UL (ref 164–446)
PMV BLD AUTO: 9.1 FL (ref 9–12.9)
POTASSIUM SERPL-SCNC: 5.2 MMOL/L (ref 3.6–5.5)
RBC # BLD AUTO: 2.44 M/UL (ref 4.2–5.4)
SODIUM SERPL-SCNC: 135 MMOL/L (ref 135–145)
WBC # BLD AUTO: 8.1 K/UL (ref 4.8–10.8)

## 2019-02-13 PROCEDURE — 36415 COLL VENOUS BLD VENIPUNCTURE: CPT

## 2019-02-13 PROCEDURE — 97535 SELF CARE MNGMENT TRAINING: CPT | Mod: XE

## 2019-02-13 PROCEDURE — 85025 COMPLETE CBC W/AUTO DIFF WBC: CPT

## 2019-02-13 PROCEDURE — 82962 GLUCOSE BLOOD TEST: CPT | Mod: 91

## 2019-02-13 PROCEDURE — 700102 HCHG RX REV CODE 250 W/ 637 OVERRIDE(OP): Performed by: HOSPITALIST

## 2019-02-13 PROCEDURE — 99225 PR SUBSEQUENT OBSERVATION CARE,LEVEL II: CPT | Performed by: INTERNAL MEDICINE

## 2019-02-13 PROCEDURE — 80048 BASIC METABOLIC PNL TOTAL CA: CPT

## 2019-02-13 PROCEDURE — G0378 HOSPITAL OBSERVATION PER HR: HCPCS

## 2019-02-13 PROCEDURE — 700102 HCHG RX REV CODE 250 W/ 637 OVERRIDE(OP): Performed by: FAMILY MEDICINE

## 2019-02-13 PROCEDURE — 97530 THERAPEUTIC ACTIVITIES: CPT

## 2019-02-13 PROCEDURE — 96372 THER/PROPH/DIAG INJ SC/IM: CPT

## 2019-02-13 PROCEDURE — A9270 NON-COVERED ITEM OR SERVICE: HCPCS | Performed by: FAMILY MEDICINE

## 2019-02-13 PROCEDURE — 97535 SELF CARE MNGMENT TRAINING: CPT | Mod: XU

## 2019-02-13 PROCEDURE — A9270 NON-COVERED ITEM OR SERVICE: HCPCS | Performed by: HOSPITALIST

## 2019-02-13 RX ADMIN — OXYCODONE HYDROCHLORIDE 5 MG: 5 TABLET ORAL at 20:32

## 2019-02-13 RX ADMIN — BENZOCAINE AND MENTHOL 1 LOZENGE: 15; 3.6 LOZENGE ORAL at 06:29

## 2019-02-13 RX ADMIN — IPRATROPIUM BROMIDE 2 PUFF: 17 AEROSOL, METERED RESPIRATORY (INHALATION) at 00:12

## 2019-02-13 RX ADMIN — TRAZODONE HYDROCHLORIDE 150 MG: 50 TABLET ORAL at 20:32

## 2019-02-13 RX ADMIN — SENNOSIDES AND DOCUSATE SODIUM 2 TABLET: 8.6; 5 TABLET ORAL at 06:30

## 2019-02-13 RX ADMIN — PATIROMER 8.4 G: 8.4 POWDER, FOR SUSPENSION ORAL at 00:12

## 2019-02-13 RX ADMIN — CYCLOBENZAPRINE 10 MG: 10 TABLET, FILM COATED ORAL at 17:29

## 2019-02-13 RX ADMIN — OXYCODONE HYDROCHLORIDE 5 MG: 5 TABLET ORAL at 17:29

## 2019-02-13 RX ADMIN — OXYCODONE HYDROCHLORIDE 5 MG: 5 TABLET ORAL at 03:07

## 2019-02-13 RX ADMIN — IPRATROPIUM BROMIDE 2 PUFF: 17 AEROSOL, METERED RESPIRATORY (INHALATION) at 11:54

## 2019-02-13 RX ADMIN — ROSUVASTATIN CALCIUM 10 MG: 10 TABLET, FILM COATED ORAL at 18:00

## 2019-02-13 RX ADMIN — DULOXETINE HYDROCHLORIDE 60 MG: 30 CAPSULE, DELAYED RELEASE ORAL at 06:28

## 2019-02-13 RX ADMIN — BUDESONIDE AND FORMOTEROL FUMARATE DIHYDRATE 2 PUFF: 160; 4.5 AEROSOL RESPIRATORY (INHALATION) at 17:20

## 2019-02-13 RX ADMIN — OXYCODONE HYDROCHLORIDE 5 MG: 5 TABLET ORAL at 14:11

## 2019-02-13 RX ADMIN — INSULIN GLARGINE 20 UNITS: 100 INJECTION, SOLUTION SUBCUTANEOUS at 17:22

## 2019-02-13 RX ADMIN — LEVOTHYROXINE SODIUM 75 MCG: 75 TABLET ORAL at 06:28

## 2019-02-13 RX ADMIN — BENZOCAINE AND MENTHOL 1 LOZENGE: 15; 3.6 LOZENGE ORAL at 20:32

## 2019-02-13 RX ADMIN — IPRATROPIUM BROMIDE 2 PUFF: 17 AEROSOL, METERED RESPIRATORY (INHALATION) at 06:32

## 2019-02-13 RX ADMIN — BUDESONIDE AND FORMOTEROL FUMARATE DIHYDRATE 2 PUFF: 160; 4.5 AEROSOL RESPIRATORY (INHALATION) at 06:28

## 2019-02-13 RX ADMIN — VARENICLINE TARTRATE 1 MG: 1 TABLET, FILM COATED ORAL at 18:00

## 2019-02-13 RX ADMIN — OXYCODONE HYDROCHLORIDE 5 MG: 5 TABLET ORAL at 06:28

## 2019-02-13 RX ADMIN — VARENICLINE TARTRATE 1 MG: 1 TABLET, FILM COATED ORAL at 08:00

## 2019-02-13 RX ADMIN — IPRATROPIUM BROMIDE 2 PUFF: 17 AEROSOL, METERED RESPIRATORY (INHALATION) at 17:22

## 2019-02-13 RX ADMIN — AMLODIPINE BESYLATE 10 MG: 10 TABLET ORAL at 06:28

## 2019-02-13 RX ADMIN — INSULIN HUMAN 2 UNITS: 100 INJECTION, SOLUTION PARENTERAL at 11:51

## 2019-02-13 RX ADMIN — SENNOSIDES AND DOCUSATE SODIUM 1 TABLET: 8.6; 5 TABLET ORAL at 17:20

## 2019-02-13 RX ADMIN — OXYCODONE HYDROCHLORIDE 5 MG: 5 TABLET ORAL at 10:10

## 2019-02-13 ASSESSMENT — GAIT ASSESSMENTS
DEVIATION: OTHER (COMMENT)
GAIT LEVEL OF ASSIST: STAND BY ASSIST
ASSISTIVE DEVICE: FRONT WHEEL WALKER
DISTANCE (FEET): 70

## 2019-02-13 ASSESSMENT — COGNITIVE AND FUNCTIONAL STATUS - GENERAL
HELP NEEDED FOR BATHING: A LITTLE
CLIMB 3 TO 5 STEPS WITH RAILING: A LITTLE
SUGGESTED CMS G CODE MODIFIER DAILY ACTIVITY: CJ
DRESSING REGULAR LOWER BODY CLOTHING: A LITTLE
DAILY ACTIVITIY SCORE: 20
MOBILITY SCORE: 21
PERSONAL GROOMING: A LITTLE
TOILETING: A LITTLE
MOVING FROM LYING ON BACK TO SITTING ON SIDE OF FLAT BED: A LITTLE
MOVING TO AND FROM BED TO CHAIR: A LITTLE
SUGGESTED CMS G CODE MODIFIER MOBILITY: CJ

## 2019-02-13 ASSESSMENT — ENCOUNTER SYMPTOMS
COUGH: 0
PALPITATIONS: 0
CONSTIPATION: 0
DIZZINESS: 0
ABDOMINAL PAIN: 0
SHORTNESS OF BREATH: 0
MYALGIAS: 1
WEAKNESS: 0
HEADACHES: 0
CHILLS: 0
FEVER: 0
NAUSEA: 0
NERVOUS/ANXIOUS: 0

## 2019-02-13 NOTE — PROGRESS NOTES
Received handoff report from SHITAL Archibald and assumed pt care at 1900.  Pt resting in bed comfortably. Labs reviewed. Patient and RN discussed plan of care and questions were answered. Bed in lowest and locked position. Call light and personal belongings within reach.

## 2019-02-13 NOTE — DISCHARGE PLANNING
Anticipated Discharge Disposition: SNF    Action: Pt discussed in IDT rounds. Per MD, pt has more questions about SNF. LSW met with pt at bedside to discuss discharge. All questions answered. Pt agreeable to go to [facility] tomorrow morning.     LSW faxed transport form to [facility].    Barriers to Discharge: None    Plan: Await transport set up.

## 2019-02-13 NOTE — DISCHARGE PLANNING
Agency/Facility Name: [facility]   Spoke To: Rissa  Outcome: Transportation cancelled for 2/14 due to patient now returning home with home health.    Received Choice form at 1505.  Agency/Facility Name: [facility]  Referral sent per Choice form at 1510.

## 2019-02-13 NOTE — FACE TO FACE
Face to Face Supporting Documentation - Home Health    The encounter with this patient was in whole or in part the primary reason for home health admission.    Date of encounter:   Patient:                    MRN:                       YOB: 2020  Priya Callahan  [id number]  1950     Home health to see patient for:  Skilled Nursing care for assessment, interventions & education, Physical Therapy evaluation and treatment and Occupational therapy evaluation and treatment    Skilled need for:  New Onset Medical Diagnosis fibular fracture    Skilled nursing interventions to include:  Comment: pt/ot    Homebound status evidenced by:  Need the aid of supportive devices such as crutches, canes, wheelchairs or walkers or Needs the assistance of another person in order to leave the home. Leaving home requires a considerable and taxing effort. There is a normal inability to leave the home.    Community Physician to provide follow up care: Kavitha Mccall M.D.     Optional Interventions? No      I certify the face to face encounter for this home health care referral meets the CMS requirements and the encounter/clinical assessment with the patient was, in whole, or in part, for the medical condition(s) listed above, which is the primary reason for home health care. Based on my clinical findings: the service(s) are medically necessary, support the need for home health care, and the homebound criteria are met.  I certify that this patient has had a face to face encounter by myself.  Valorie Hemphill D.O. - NPI: [id number]

## 2019-02-13 NOTE — PROGRESS NOTES
Bedside report received. Assumed care of pt.  Pt able to make needs known.  Pt shows no s/s of distress.  Resting comfortably in bed.  Fall precautions in place, call light and belongings within reach. Plan of care discussed, all questions and concerns answered. Pt encouraged to continue independently ambulating with SBA and FWW to restroom, encouraged to walk as far as she can tolerate today.  Pt also performing self care in regards to performing pericare.  Pt motivated to go home, baseline O2 use.  C/o pain, mostly chronic pain, less acute.  Hourly rounding in place.

## 2019-02-13 NOTE — CARE PLAN
Problem: Safety  Goal: Will remain free from falls  Outcome: PROGRESSING AS EXPECTED  Pt instructed to call nursing staff before attempting to get out of bed. Pt verbalized understanding. Bed in lowest and locked position. Pt is refusing a bed alarm.     Problem: Pain Management  Goal: Pain level will decrease to patient's comfort goal  Outcome: PROGRESSING AS EXPECTED  Pt feels pain is well controlled on current pain medication regimen.

## 2019-02-13 NOTE — PROGRESS NOTES
[facility] Medicine Daily Progress Note    Date of Service  2/13/2019    Chief Complaint  68 y.o. female admitted 2/8/2019 with Fibula fracture    Hospital Course  Admitted with Fibula fracture after a fall, frequent falls occurring 3 times the past month.    Interval Problem Update  Fibula fracture - nonsurgical per orthopedic surgery, PT and OT evaluations done, less hematoma and swelling  Fall - already using a walker at home.      Ambulated with moderate assistance  Encourage activity  Patient considering nursing facility placement    Consultants/Specialty  Orthopedic surgery    Code Status  Full    Disposition  SNF vs home with home health, or home assistance  SW to assist  OOB tid    Review of Systems  Review of Systems   Constitutional: Negative for chills and fever.   Respiratory: Negative for cough and shortness of breath.    Cardiovascular: Negative for chest pain, palpitations and leg swelling.   Gastrointestinal: Negative for abdominal pain, constipation and nausea.   Musculoskeletal: Positive for joint pain and myalgias.   Neurological: Negative for dizziness, weakness and headaches.   Psychiatric/Behavioral: The patient is not nervous/anxious.         Physical Exam  Temp:  [36.3 °C (97.3 °F)-37.6 °C (99.6 °F)] 37.3 °C (99.1 °F)  Pulse:  [70-86] 76  Resp:  [15-22] 22  BP: (133-153)/(50-67) 136/54  SpO2:  [90 %-94 %] 92 %    Physical Exam   Constitutional: She is oriented to person, place, and time. She appears well-developed and well-nourished. No distress.   HENT:   Head: Normocephalic and atraumatic.   Mouth/Throat: No oropharyngeal exudate.   Eyes: Pupils are equal, round, and reactive to light. Right eye exhibits no discharge.   Neck: Normal range of motion. No JVD present.   Cardiovascular: Normal rate, regular rhythm and intact distal pulses.    Pulmonary/Chest: Effort normal. No respiratory distress.   Abdominal: Soft. Bowel sounds are normal. She exhibits no distension.   Musculoskeletal: She  exhibits edema. She exhibits no tenderness.   Hematoma Right ankle   Neurological: She is alert and oriented to person, place, and time. No cranial nerve deficit. She exhibits normal muscle tone. Coordination normal.   Skin: Skin is warm and dry. No erythema.   Psychiatric: Her behavior is normal.   Nursing note and vitals reviewed.      Fluids    Intake/Output Summary (Last 24 hours) at 02/13/19 1434  Last data filed at 02/13/19 0900   Gross per 24 hour   Intake              800 ml   Output                0 ml   Net              800 ml       Laboratory  Recent Labs      02/11/19 0058 02/12/19 0239 02/13/19 0058   WBC  9.9  9.6  8.1   RBC  2.67*  2.78*  2.44*   HEMOGLOBIN  8.4*  9.0*  7.8*   HEMATOCRIT  26.3*  27.8*  24.6*   MCV  98.5*  100.0*  100.8*   MCH  31.5  32.4  32.0   MCHC  31.9*  32.4*  31.7*   RDW  45.6  46.5  46.0   PLATELETCT  226  234  218   MPV  9.0  8.8*  9.1     Recent Labs      02/11/19 0058 02/12/19 0239 02/13/19 0058   SODIUM  130*  137  135   POTASSIUM  5.0  5.1  5.2   CHLORIDE  99  101  102   CO2  25  29  29   GLUCOSE  108*  89  104*   BUN  30*  32*  34*   CREATININE  1.54*  1.70*  1.69*   CALCIUM  8.8  8.8  8.2*                   Imaging  DX-FOOT-COMPLETE 3+ RIGHT   Final Result      Fracture of the distal fibula with soft tissue swelling along the lateral ankle and foot.      Calcaneal spurring.      Small ankle joint effusion.         DX-ANKLE 3+ VIEWS RIGHT   Final Result      Oblique minimally displaced fracture of the distal fibula with overlying soft tissue swelling.      Calcaneal spurring.      Ankle joint effusion.              Assessment/Plan  * Closed fracture of distal end of right fibula- (present on admission)   Assessment & Plan    Cam walker, WBAT  PT/OT     Frequent falls- (present on admission)   Assessment & Plan    PT/OT    2/12 encourage activity  Patient now refusing snf placement  RN to cont therapy, OOB tid  ? Home with home health    2/13 encourage  activity     Anemia associated with acute blood loss   Assessment & Plan    H/h stable    2/13- hgb 9 to 7.8, f/u am, hemodynamically stable     Macrocytosis- (present on admission)   Assessment & Plan    b12, folate, tsh wnl     Chronic hypertension- (present on admission)   Assessment & Plan    Amlodipine     Leukocytosis- (present on admission)   Assessment & Plan    Follow CBC     Type 2 diabetes mellitus with hyperglycemia, with long-term current use of insulin ([facility])- (present on admission)   Assessment & Plan    Lantus, SSI     Chronic respiratory failure ([facility])- (present on admission)   Assessment & Plan    Keep O2 sats above 92%  Encourage I-S, RT protocol     Chronic kidney disease, stage 3 ([facility])- (present on admission)   Assessment & Plan    With ARF, improving  Monitor renal function, avoid nephrotoxic rx  - encourage oral fluid intake     COPD (chronic obstructive pulmonary disease) ([facility])- (present on admission)   Assessment & Plan    Symbicort, RT protocol     Obesity (BMI 30-39.9)- (present on admission)   Assessment & Plan    Body mass index is 34.95 kg/m².      HLD (hyperlipidemia)- (present on admission)   Assessment & Plan    Rosuvastatin     Tobacco abuse disorder- (present on admission)   Assessment & Plan    Chantix     Chronic constipation- (present on admission)   Assessment & Plan    Bowel protocol     Hypothyroidism- (present on admission)   Assessment & Plan    Synthroid     Major depressive disorder ([facility])- (present on admission)   Assessment & Plan    Cymbalta          VTE prophylaxis: Lovenox

## 2019-02-13 NOTE — DISCHARGE PLANNING
Agency/Facility Name: [facility]   Spoke To: Rissa   Outcome: Transportation scheduled for 1/14 at 1100 via [facility]'s transport. LIZETT Rodriguez notified.

## 2019-02-13 NOTE — CARE PLAN
Problem: Safety  Goal: Will remain free from falls    Intervention: Assess risk factors for falls  Pt is high risk to fall and is encouraged to increase independence to return home.  Pt has been asked to balance these needs and error on the side of caution to avoid GLFs here at the hospital.  Pt refuses use of bed alarm, but is compliant with staff requests and calls appropriately for assistance.

## 2019-02-13 NOTE — DISCHARGE PLANNING
Anticipated Discharge Disposition: Home with Home Health    Action: PT/OT cleared pt to return home with home health. LSW met with pt at bedside to discuss. Pt prefers to return home with home health then go to SNF. Pt had [facility] prior and would like to continue with [facility].    LSW contacted MD and informed of above.    Faxed CHOICE to [facility].    Barriers to Discharge: None    Plan: Await [facility] acceptance.

## 2019-02-13 NOTE — THERAPY
"Physical Therapy Treatment completed.   Bed Mobility:  Supine to Sit: Supervised  Transfers: Sit to Stand: Stand by Assist  Gait: Level Of Assist: Stand by Assist with Front-Wheel Walker       Plan of Care: Patient with no further skilled PT needs in the acute care setting at this time  Discharge Recommendations: Equipment: No Equipment Needed. Pt reports will use 4WW at home; declines FWW prior to dc as she reports will likely not use and will continue to use her 4WW; appears capable of 4WW use  as minimally reliance on AD for balance     Pt progressing well with regards to mobility, She is able to demonsrate short distance ambulation with FWW with SBA with no rick LOB. She does require seated rest break 2' to lightheadedness though has no more symptoms with addtional ambulation after supplemental 02 increased (see Kardex for details). Pt reports no concerns with functional ability to dc to home once medically cleared. WOUld recommend continued PT after dc to home as precuations are lifted.     See \"Rehab Therapy-Acute\" Patient Summary Report for complete documentation.       "

## 2019-02-13 NOTE — DISCHARGE PLANNING
Agency/Facility Name: [facility]   Spoke To: Salma   Outcome: Patient accepted. [facility] informed patient will be discharging on 2/14.

## 2019-02-13 NOTE — THERAPY
"Occupational Therapy Treatment completed with focus on ADLs and ADL transfers.  Functional Status: Pt seen for OT session. Completed supine to sit with SBA with HOB elevated. Donned cam boot with min A, for velcro on boot, otherwise req SBA. Req seated RB, reported difficulty d/t just having ate and SOB. Ambulated to BR with FWW and completed toileting and toilet txf with SBA and use of GB, but reports she has two in her bathroom at home. Req 1 v/c for safety with FWW. Completed hand hygiene with SBA. Ambulated back to bed and completed stand>sit with SBA. Left seated EOB with dietary in room, waiting for PT.  Plan of Care: Will benefit from Occupational Therapy 3 times per week  Discharge Recommendations:  Equipment Front-Wheel Walker, Raised Toilet Seat and Will Continue to Assess for Equipment Needs. Post-acute therapy: Currently refusing SNF, is still a fall risk, but has safety AE for bathroom, therefore recommend home health for continued occupational therapy services.       See \"Rehab Therapy-Acute\" Patient Summary Report for complete documentation.     Pt seen for OT session. Progressing with strength and activity tolerance this session. Able to complete ADLs/txfs with CGA-SBA, and demo'd good insight into when she needs to take RBs during activity. Continues to be limited by decreased balance, strength, activity tolerance, and pain. Reported sometimes she \"cheats and takes her O2 off to smoke.\" Recommend continued acute OT, currently refusing SNF, is still a fall risk, but has safety AE for bathroom, therefore recommend home health for continued occupational therapy services.  "

## 2019-02-14 VITALS
DIASTOLIC BLOOD PRESSURE: 58 MMHG | TEMPERATURE: 98.4 F | WEIGHT: 210 LBS | HEIGHT: 65 IN | HEART RATE: 71 BPM | RESPIRATION RATE: 18 BRPM | OXYGEN SATURATION: 97 % | SYSTOLIC BLOOD PRESSURE: 131 MMHG | BODY MASS INDEX: 34.99 KG/M2

## 2019-02-14 LAB
ANION GAP SERPL CALC-SCNC: 9 MMOL/L (ref 0–11.9)
BASOPHILS # BLD AUTO: 0.5 % (ref 0–1.8)
BASOPHILS # BLD: 0.04 K/UL (ref 0–0.12)
BUN SERPL-MCNC: 31 MG/DL (ref 8–22)
CALCIUM SERPL-MCNC: 8.9 MG/DL (ref 8.5–10.5)
CHLORIDE SERPL-SCNC: 103 MMOL/L (ref 96–112)
CO2 SERPL-SCNC: 24 MMOL/L (ref 20–33)
CREAT SERPL-MCNC: 1.63 MG/DL (ref 0.5–1.4)
EOSINOPHIL # BLD AUTO: 0.18 K/UL (ref 0–0.51)
EOSINOPHIL NFR BLD: 2.3 % (ref 0–6.9)
ERYTHROCYTE [DISTWIDTH] IN BLOOD BY AUTOMATED COUNT: 47.4 FL (ref 35.9–50)
GLUCOSE BLD-MCNC: 115 MG/DL (ref 65–99)
GLUCOSE SERPL-MCNC: 120 MG/DL (ref 65–99)
HCT VFR BLD AUTO: 26.7 % (ref 37–47)
HGB BLD-MCNC: 8.3 G/DL (ref 12–16)
IMM GRANULOCYTES # BLD AUTO: 0.12 K/UL (ref 0–0.11)
IMM GRANULOCYTES NFR BLD AUTO: 1.5 % (ref 0–0.9)
LYMPHOCYTES # BLD AUTO: 1.52 K/UL (ref 1–4.8)
LYMPHOCYTES NFR BLD: 19.1 % (ref 22–41)
MCH RBC QN AUTO: 31.7 PG (ref 27–33)
MCHC RBC AUTO-ENTMCNC: 31.1 G/DL (ref 33.6–35)
MCV RBC AUTO: 101.9 FL (ref 81.4–97.8)
MONOCYTES # BLD AUTO: 0.72 K/UL (ref 0–0.85)
MONOCYTES NFR BLD AUTO: 9 % (ref 0–13.4)
NEUTROPHILS # BLD AUTO: 5.39 K/UL (ref 2–7.15)
NEUTROPHILS NFR BLD: 67.6 % (ref 44–72)
NRBC # BLD AUTO: 0 K/UL
NRBC BLD-RTO: 0 /100 WBC
PLATELET # BLD AUTO: 247 K/UL (ref 164–446)
PMV BLD AUTO: 9.1 FL (ref 9–12.9)
POTASSIUM SERPL-SCNC: 4.5 MMOL/L (ref 3.6–5.5)
RBC # BLD AUTO: 2.62 M/UL (ref 4.2–5.4)
SODIUM SERPL-SCNC: 136 MMOL/L (ref 135–145)
WBC # BLD AUTO: 8 K/UL (ref 4.8–10.8)

## 2019-02-14 PROCEDURE — 700102 HCHG RX REV CODE 250 W/ 637 OVERRIDE(OP): Performed by: FAMILY MEDICINE

## 2019-02-14 PROCEDURE — G0378 HOSPITAL OBSERVATION PER HR: HCPCS

## 2019-02-14 PROCEDURE — 700102 HCHG RX REV CODE 250 W/ 637 OVERRIDE(OP): Performed by: HOSPITALIST

## 2019-02-14 PROCEDURE — 96372 THER/PROPH/DIAG INJ SC/IM: CPT

## 2019-02-14 PROCEDURE — A9270 NON-COVERED ITEM OR SERVICE: HCPCS | Performed by: HOSPITALIST

## 2019-02-14 PROCEDURE — 36415 COLL VENOUS BLD VENIPUNCTURE: CPT

## 2019-02-14 PROCEDURE — 80048 BASIC METABOLIC PNL TOTAL CA: CPT

## 2019-02-14 PROCEDURE — A9270 NON-COVERED ITEM OR SERVICE: HCPCS | Performed by: FAMILY MEDICINE

## 2019-02-14 PROCEDURE — 700111 HCHG RX REV CODE 636 W/ 250 OVERRIDE (IP): Performed by: HOSPITALIST

## 2019-02-14 PROCEDURE — 85025 COMPLETE CBC W/AUTO DIFF WBC: CPT

## 2019-02-14 PROCEDURE — 99217 PR OBSERVATION CARE DISCHARGE: CPT | Performed by: INTERNAL MEDICINE

## 2019-02-14 PROCEDURE — 82962 GLUCOSE BLOOD TEST: CPT

## 2019-02-14 RX ORDER — OXYCODONE HYDROCHLORIDE 5 MG/1
5 TABLET ORAL
Qty: 10 TAB | Refills: 0 | Status: SHIPPED | OUTPATIENT
Start: 2019-02-14 | End: 2019-02-17

## 2019-02-14 RX ADMIN — BUDESONIDE AND FORMOTEROL FUMARATE DIHYDRATE 2 PUFF: 160; 4.5 AEROSOL RESPIRATORY (INHALATION) at 05:01

## 2019-02-14 RX ADMIN — VARENICLINE TARTRATE 1 MG: 1 TABLET, FILM COATED ORAL at 09:00

## 2019-02-14 RX ADMIN — OXYCODONE HYDROCHLORIDE 5 MG: 5 TABLET ORAL at 08:14

## 2019-02-14 RX ADMIN — LEVOTHYROXINE SODIUM 75 MCG: 75 TABLET ORAL at 05:02

## 2019-02-14 RX ADMIN — ENOXAPARIN SODIUM 40 MG: 100 INJECTION SUBCUTANEOUS at 05:02

## 2019-02-14 RX ADMIN — DULOXETINE HYDROCHLORIDE 60 MG: 30 CAPSULE, DELAYED RELEASE ORAL at 05:01

## 2019-02-14 RX ADMIN — SENNOSIDES AND DOCUSATE SODIUM 2 TABLET: 8.6; 5 TABLET ORAL at 05:04

## 2019-02-14 RX ADMIN — CYCLOBENZAPRINE 10 MG: 10 TABLET, FILM COATED ORAL at 08:14

## 2019-02-14 RX ADMIN — AMLODIPINE BESYLATE 10 MG: 10 TABLET ORAL at 05:01

## 2019-02-14 RX ADMIN — OXYCODONE HYDROCHLORIDE 5 MG: 5 TABLET ORAL at 05:07

## 2019-02-14 RX ADMIN — OXYCODONE HYDROCHLORIDE 5 MG: 5 TABLET ORAL at 00:34

## 2019-02-14 RX ADMIN — IPRATROPIUM BROMIDE 2 PUFF: 17 AEROSOL, METERED RESPIRATORY (INHALATION) at 05:01

## 2019-02-14 RX ADMIN — IPRATROPIUM BROMIDE 2 PUFF: 17 AEROSOL, METERED RESPIRATORY (INHALATION) at 00:34

## 2019-02-14 NOTE — CARE PLAN
Problem: Discharge Barriers/Planning  Goal: Patient's continuum of care needs will be met    Intervention: Involve patient and significant other/support system in setting and prioritizing goals for hospital stay and discharge  Pt was given discharge instructions and narcotic prescriptions (oxy 5).  Pt IV d/c'd tip intact.  Pt verbalized understanding of instructions, new medication instructions and follow up.  Pt left with friend in private vehicle with dme raised toilet seat.  Pt has walker at home and O2 at home, HH is approved.

## 2019-02-14 NOTE — PROGRESS NOTES
Bedside report received. Assumed care of pt.  Pt able to make needs known.  Pt shows no s/s of distress.  Resting comfortably in bed.  Fall precautions in place, call light and belongings within reach. Plan of care discussed, all questions and concerns answered. Pt wanting shower this morning, RN attempting to provide, since pt wants shower in shower room upstairs.  Pt has been offered shower by NOC shift once and by day CNA, and refused to wait for bedside RN on day despite managing up the care.   Hourly rounding in place.

## 2019-02-14 NOTE — CARE PLAN
Problem: Discharge Barriers/Planning  Goal: Patient's continuum of care needs will be met  Outcome: PROGRESSING AS EXPECTED  Pt expected to discharge home with HH today.

## 2019-02-14 NOTE — PROGRESS NOTES
Pt refuses use of bed alarm.  Pt has been educated on current level of risk, and interventions in place to mitigate risk.  Pt verbalized understanding and still refuses.

## 2019-02-19 DIAGNOSIS — Z79.4 TYPE 2 DIABETES MELLITUS WITH HYPERGLYCEMIA, WITH LONG-TERM CURRENT USE OF INSULIN (HCC): ICD-10-CM

## 2019-02-19 DIAGNOSIS — E11.65 TYPE 2 DIABETES MELLITUS WITH HYPERGLYCEMIA, WITH LONG-TERM CURRENT USE OF INSULIN (HCC): ICD-10-CM

## 2019-02-21 RX ORDER — CYCLOBENZAPRINE HCL 5 MG
TABLET ORAL
Qty: 60 TAB | Refills: 1 | Status: SHIPPED | OUTPATIENT
Start: 2019-02-21 | End: 2019-03-19 | Stop reason: SDUPTHER

## 2019-02-21 RX ORDER — INSULIN GLARGINE 100 [IU]/ML
INJECTION, SOLUTION SUBCUTANEOUS
Qty: 15 ML | Refills: 2 | Status: SHIPPED | OUTPATIENT
Start: 2019-02-21 | End: 2019-04-14

## 2019-02-21 NOTE — TELEPHONE ENCOUNTER
Was the patient seen in the last year in this department? Yes    Does patient have an active prescription for medications requested? No     Received Request Via: Pharmacy      Pt met protocol?: Yes pt last ov 1/19   Lab Results   Component Value Date/Time    HBA1C 6.0 (H) 01/22/2019 09:55 PM

## 2019-02-21 NOTE — TELEPHONE ENCOUNTER
Aida, Refill request for Lantus. I don't see where it has ever been filled from your office.  Please refill as you see fit.

## 2019-02-26 ENCOUNTER — APPOINTMENT (OUTPATIENT)
Dept: RADIOLOGY | Facility: IMAGING CENTER | Age: 69
End: 2019-02-26
Attending: NURSE PRACTITIONER
Payer: MEDICARE

## 2019-02-26 ENCOUNTER — OFFICE VISIT (OUTPATIENT)
Dept: MEDICAL GROUP | Facility: PHYSICIAN GROUP | Age: 69
End: 2019-02-26
Payer: MEDICARE

## 2019-02-26 ENCOUNTER — NON-PROVIDER VISIT (OUTPATIENT)
Dept: URGENT CARE | Facility: PHYSICIAN GROUP | Age: 69
End: 2019-02-26
Payer: MEDICARE

## 2019-02-26 VITALS
DIASTOLIC BLOOD PRESSURE: 76 MMHG | RESPIRATION RATE: 20 BRPM | BODY MASS INDEX: 38.32 KG/M2 | HEART RATE: 84 BPM | WEIGHT: 230 LBS | SYSTOLIC BLOOD PRESSURE: 130 MMHG | OXYGEN SATURATION: 96 % | TEMPERATURE: 97.9 F | HEIGHT: 65 IN

## 2019-02-26 DIAGNOSIS — Z79.4 TYPE 2 DIABETES MELLITUS WITH HYPERGLYCEMIA, WITH LONG-TERM CURRENT USE OF INSULIN (HCC): ICD-10-CM

## 2019-02-26 DIAGNOSIS — M25.511 RIGHT SHOULDER PAIN, UNSPECIFIED CHRONICITY: ICD-10-CM

## 2019-02-26 DIAGNOSIS — E03.9 HYPOTHYROIDISM, UNSPECIFIED TYPE: Chronic | ICD-10-CM

## 2019-02-26 DIAGNOSIS — M54.2 NECK PAIN: ICD-10-CM

## 2019-02-26 DIAGNOSIS — J44.9 CHRONIC OBSTRUCTIVE PULMONARY DISEASE, UNSPECIFIED COPD TYPE (HCC): Chronic | ICD-10-CM

## 2019-02-26 DIAGNOSIS — M25.511 ACUTE PAIN OF RIGHT SHOULDER: ICD-10-CM

## 2019-02-26 DIAGNOSIS — E11.65 TYPE 2 DIABETES MELLITUS WITH HYPERGLYCEMIA, WITH LONG-TERM CURRENT USE OF INSULIN (HCC): ICD-10-CM

## 2019-02-26 DIAGNOSIS — Z09 HOSPITAL DISCHARGE FOLLOW-UP: ICD-10-CM

## 2019-02-26 PROCEDURE — 72040 X-RAY EXAM NECK SPINE 2-3 VW: CPT | Mod: TC,FY | Performed by: NURSE PRACTITIONER

## 2019-02-26 PROCEDURE — 73030 X-RAY EXAM OF SHOULDER: CPT | Mod: TC,FY,RT | Performed by: NURSE PRACTITIONER

## 2019-02-26 PROCEDURE — 99214 OFFICE O/P EST MOD 30 MIN: CPT | Performed by: NURSE PRACTITIONER

## 2019-02-26 RX ORDER — OXYCODONE HYDROCHLORIDE 5 MG/1
TABLET ORAL
Qty: 20 TAB | Refills: 0 | Status: SHIPPED | OUTPATIENT
Start: 2019-02-26 | End: 2019-03-18

## 2019-02-26 RX ORDER — AMITRIPTYLINE HYDROCHLORIDE 50 MG/1
50 TABLET, FILM COATED ORAL
Refills: 3 | COMMUNITY
Start: 2019-02-19 | End: 2020-05-05

## 2019-02-26 ASSESSMENT — PAIN SCALES - GENERAL: PAINLEVEL: 8=MODERATE-SEVERE PAIN

## 2019-02-26 NOTE — PROGRESS NOTES
CC: Hospital discharge follow-up, neck and shoulder pain    HISTORY OF THE PRESENT ILLNESS: Patient is a 68 y.o. female. This pleasant patient is here today for evaluation management following health problems.  Patient is new to me.  Her primary care provider is Dr. Mccall.    Hospital discharge follow-up  Patient is here for hospital discharge follow-up.  She was admitted to [facility] from 2/8/19 to 2/14/19 for distal fibula fracture.  Orthopedic consult deemed at nonsurgical.  Patient was discharged home with home health.  She has been receiving physical therapy and home health aide.  Patient has follow-up appointment with orthopedist on 3/20/19.     Neck pain  Patient reports new onset of neck pain and right shoulder pain approximately 4 weeks ago.  Denies injury.  Reports pain starts at neck and radiates down her shoulder to her mid right arm.  Aggravating factors include turning head to the right and abducting arm.  Patient has been taking oxycodone that she takes for chronic pain with minimal relief.  She has not used heat or ice.  Recommended patient to use TENS unit, heat, ice.  Will also refer to home health physical therapy for neck pain and shoulder pain.  Will get cervical spine and left shoulder x-ray.  Patient has chronic back pain and takes oxycodone 10 mg 3 times a day for chronic pain.  She reports she has been taking extra tabs per day for acute right shoulder and neck pain.  Will prescribe temporary prescription of oxycodone 5 mg to take for severe neck and shoulder pain up to once a day in addition to which she takes for chronic pain.  I explained to patient that pharmacy may not fill this as they have levels about how often they can refill narcotics.  Patient verbalized understanding.      Acute pain of right shoulder  Please see additional notes under neck pain.    COPD (chronic obstructive pulmonary disease) (HCC)  This is chronic health problem for patient.  She is using Symbicort  inhaler routinely, Atrovent routinely every 6 hours.  Patient is mildly short of breath while resting during exam today.  She has supplemental oxygen.  She has not seen pulmonology in several years.  Will refer to pulmonology for further evaluation.      Allergies: Vitamin c; Keflex; Codeine; Gabapentin; Lyrica; and Sudafed    Current Outpatient Prescriptions Ordered in [facility]   Medication Sig Dispense Refill   • amitriptyline (ELAVIL) 50 MG Tab   3   • oxyCODONE immediate-release (ROXICODONE) 5 MG Tab Take one tab once a day as needed for severe neck pain.  May take in addition to oxycodone 10 mg for chronic pain 20 Tab 0   • LANTUS SOLOSTAR 100 UNIT/ML Solution Pen-injector injection INJECT 5-20 UNITS SUBCUTANEOUSLY EVERY EVENING 15 mL 2   • cyclobenzaprine (FLEXERIL) 5 MG tablet TAKE ONE TO TWO TABLETS BY MOUTH THREE TIMES DAILY AS NEEDED 60 Tab 1   • acetaminophen (TYLENOL) 325 MG Tab Take 2 Tabs by mouth every 6 hours as needed (Mild Pain; (Pain scale 1-3); Temp greater than 100.5 F). 30 Tab 0   • traZODone (DESYREL) 50 MG Tab Take 150 mg by mouth every evening.     • patiromer (VELTASSA) 8.4 g Pack Take 8.4 g by mouth every day. 30 Each 1   • amLODIPine (NORVASC) 10 MG Tab Take 1 Tab by mouth every day. 30 Tab 0   • DULoxetine (CYMBALTA) 60 MG Cap DR Particles delayed-release capsule TAKE 1 CAPSULE BY MOUTH DAILY 90 Cap 1   • rosuvastatin (CRESTOR) 10 MG Tab Take 1 Tab by mouth every evening. 90 Tab 3   • lactulose 10 GM/15ML Solution TAKE 30 MLS (6 TEASPOONFULS) BY MOUTH TWO TIMES DAILY 473 mL 0   • SYMBICORT 160-4.5 MCG/ACT Aerosol INHALE 2 PUFFS BY MOUTH TWO TIMES DAILY 3 Inhaler 0   • levothyroxine (SYNTHROID) 75 MCG Tab TAKE 1 TABLET BY MOUTH DAILY 90 Tab 1   • Nutritional Supplements (ANTIOXIDANTS PO) Take 1 Tab by mouth every day.     • varenicline (CHANTIX CONTINUING MONTH LUANN) 1 MG tablet Take 1 Tab by mouth 2 times a day. 60 Tab 3   • Diclofenac Sodium 1 % Gel Apply 2 g to skin as directed 2 times a  "day as needed.     • ipratropium (ATROVENT) 17 MCG/ACT Aero Soln Inhale 2 Puffs by mouth every 6 hours. 17 g 11     No current Epic-ordered facility-administered medications on file.        Past Medical History:   Diagnosis Date   • Arthritis     \"everywhere\"   • ASTHMA    • Backpain     chronic back pain   • Breath shortness     \"only with flare up with allergies\"   • Bronchitis     as a child   • Congestive heart failure (HCC) 2013    related to pulmonary failure   • COPD    • Diabetes     Type 2   • Disorder of thyroid     Hypothyroid   • Fall    • Fibromyalgia    • GERD (gastroesophageal reflux disease)    • Heart burn    • Hepatitis C     \"I have markers in blood but not active\"   • Hypertension    • Indigestion    • Infectious disease     Hepatitis C   • Neuropathy (HCC)     bilat feet   • On home oxygen therapy    • Other specified symptom associated with female genital organs     Hysterectomy at age 22yrs   • Pain 9/13/2016    \"pain everywhere\"   • PND (post-nasal drip)    • Pneumonia     as a child   • Psychiatric problem 9/13/2016    PTSD   • RLS (restless legs syndrome)    • Sleep apnea     does not wear CPAP, \"can't do it\"   • Unspecified urinary incontinence        Past Surgical History:   Procedure Laterality Date   • ANKLE ORIF Left 5/8/2017    Procedure: ANKLE ORIF;  Surgeon: Rico Gtz M.D.;  Location: [facility];  Service:    • PA DSTR NROLYTC AGNT PARVERTEB FCT SNGL LMBR/SACRAL Left 9/16/2016    Procedure: NEURO DEST FACET L/S W/IG SNGL - L3-S1;  Surgeon: Xavier Arteaga D.O.;  Location: [facility];  Service: Pain Management   • PA DSTR NROLYTC AGNT PARVERTEB FCT ADDL LMBR/SACRAL  9/16/2016    Procedure: NEURO DEST FACET L/S W/IG ADDL;  Surgeon: Xavier Arteaga D.O.;  Location: [facility];  Service: Pain Management   • PA DSTR NROLYTC AGNT PARVERTEB FCT ADDL LMBR/SACRAL  9/16/2016    Procedure: NEURO DEST FACET L/S W/IG ADDL;  Surgeon: Xavier" INESSA Arteaga D.O.;  Location: [facility];  Service: Pain Management   • PB FLUOROSCOPIC GUIDANCE NEEDLE PLACEMENT  9/16/2016    Procedure: FLOURO GUIDE NEEDLE PLACEMENT;  Surgeon: Xavier Arteaga D.O.;  Location: [facility];  Service: Pain Management   • GA DSTR NROLYTC AGNT PARVERTEB FCT SNGL LMBR/SACRAL Right 11/6/2015    Procedure: NEURO DEST FACET L/S W/IG SNGL - L3-S1;  Surgeon: Xavier Arteaga D.O.;  Location: [facility];  Service: Pain Management   • GA DSTR NROLYTC AGNT PARVERTEB FCT ADDL LMBR/SACRAL  11/6/2015    Procedure: NEURO DEST FACET L/S W/IG ADDL;  Surgeon: Xavier Arteaga D.O.;  Location: [facility];  Service: Pain Management   • PB INJECT RX OTHER PERIPH NERVE  11/6/2015    Procedure: NEUROLYTIC DEST-OTHER NERVE;  Surgeon: Xavier Arteaga D.O.;  Location: [facility];  Service: Pain Management   • GA DSTR NROLYTC AGNT PARVERTEB FCT ADDL LMBR/SACRAL  11/6/2015    Procedure: NEURO DEST FACET L/S W/IG ADDL;  Surgeon: Xavier Arteaga D.O.;  Location: [facility];  Service: Pain Management   • GA DSTR NROLYTC AGNT PARVERTEB FCT SNGL LMBR/SACRAL Left 10/2/2015    Procedure: NEURO DEST FACET L/S W/IG SNGL - L3-S1;  Surgeon: Xavier Arteaga D.O.;  Location: [facility];  Service: Pain Management   • GA DSTR NROLYTC AGNT PARVERTEB FCT ADDL LMBR/SACRAL  10/2/2015    Procedure: NEURO DEST FACET L/S W/IG ADDL;  Surgeon: Xavier Arteaga D.O.;  Location: [facility];  Service: Pain Management   • PB INJECT RX OTHER PERIPH NERVE  10/2/2015    Procedure: NEUROLYTIC DEST-OTHER NERVE;  Surgeon: Xavier Arteaga D.O.;  Location: [facility];  Service: Pain Management   • GA DSTR NROLYTC AGNT PARVERTEB FCT ADDL LMBR/SACRAL  10/2/2015    Procedure: NEURO DEST FACET L/S W/IG ADDL;  Surgeon: Xavier Arteaga D.O.;  [facility];  Service: Pain  Management   • PB INJ DX/THER AGNT PARAVERT FACET JOINT, OBED* Left 7/17/2015    Procedure: INJ PARA FACET L/S 1 LVL W/IG - L3-S1;  Surgeon: Xavier Arteaga D.O.;  Location: [facility];  Service: Pain Management   • PB INJ DX/THER AGNT PARAVERT FACET JOINT, OBED*  7/17/2015    Procedure: INJ PARA FACET L/S 2D LVL W/IG;  Surgeon: Xavier Arteaga D.O.;  Location: SURGERY [facility];  Service: Pain Management   • PB INJ DX/THER AGNT PARAVERT FACET JOINT, OBED*  7/17/2015    Procedure: INJ PARA FACET L/S 3D LVL W/IG;  Surgeon: Xavier Arteaga D.O.;  Location: SUR[facility]ERY [facility];  Service: Pain Management   • PB INJECT NERV BLCK,OTHR PERIPH NERV  7/17/2015    Procedure: INJ-ANES AGENT-OTHER PHER.NRVE;  Surgeon: Xavier Arteaga D.O.;  Location: SURGERY [facility];  Service: Pain Management   • PB INJ DX/THER AGNT PARAVERT FACET JOINT, OBED* Left 7/10/2015    Procedure: INJ PARA FACET L/S 1 LVL W/IG - L3-S1;  Surgeon: Xavier Arteaga D.O.;  Location: [facility];  Service: Pain Management   • PB INJ DX/THER AGNT PARAVERT FACET JOINT, OBED*  7/10/2015    Procedure: INJ PARA FACET L/S 2D LVL W/IG;  Surgeon: Xavier Arteaga D.O.;  Location: SURGERY [facility];  Service: Pain Management   • PB INJ DX/THER AGNT PARAVERT FACET JOINT, OBED*  7/10/2015    Procedure: INJ PARA FACET L/S 3D LVL W/IG;  Surgeon: Xavier Arteaga D.O.;  Location: SURGERY [facility];  Service: Pain Management   • PB INJECT NERV BLCK,OTHR PERIPH NERV  7/10/2015    Procedure: INJ-ANES AGENT-OTHER PHER.NRVE;  Surgeon: Xavier Artaega D.O.;  Location: SURGERY [facility];  Service: Pain Management   • GYN SURGERY      hysterectomy    • LUMPECTOMY Left     Left breast   • OTHER ORTHOPEDIC SURGERY      L knee replacement    • OTHER ORTHOPEDIC SURGERY      R carpal tunnel    • US-NEEDLE CORE BX-BREAST PANEL         Social History   Substance Use Topics   • Smoking status: Current  "Every Day Smoker     Packs/day: 1.00     Years: 50.00     Types: Cigarettes     Last attempt to quit: 3/12/2018   • Smokeless tobacco: Never Used   • Alcohol use No       Family History   Problem Relation Age of Onset   • Lung Disease Mother    • Alcohol/Drug Mother    • Heart Disease Mother    • Cancer Father    • Cancer Brother    • Diabetes Brother    • Diabetes Maternal Grandmother    • Stroke Paternal Grandmother    • Heart Disease Paternal Grandmother        ROS:   As in HPI, otherwise negative for chest pain,  abdominal pain, dysuria, blood in stool, fever           Exam: Blood pressure 130/76, pulse 84, temperature 36.6 °C (97.9 °F), temperature source Temporal, resp. rate 20, height 1.651 m (5' 5\"), weight 104.3 kg (230 lb), SpO2 96 %, not currently breastfeeding. Body mass index is 38.27 kg/m².    General: Alert, pleasant, obese habitus, well nourished, well developed female in NAD  Neck: Supple without bruit. Thyroid is not enlarged.  Pulmonary: Clear to ausculation.  Mild effort. No rales, ronchi, or wheezing.  Supplemental oxygen via nasal cannula.  Cardiovascular: Normal rate and rhythm without murmur. Carotid and radial pulses are intact and equal bilaterally.    Cervical spine and right shoulder: No erythema or edema, severely tender to palpation spinous process across trapezius to right shoulder, limited range of motion of shoulder secondary to pain, normal range of motion of cervical spine,  strength equal bilaterally.  Abdomen: Soft, nontender, nondistended.  Neurologic: Grossly nonfocal  Skin: Warm and dry.  No obvious lesions.  Musculoskeletal: Walking boot right foot.  Psych: Normal mood and affect. Alert and oriented. Judgment and insight is normal.    Please note that this dictation was created using voice recognition software. I have made every reasonable attempt to correct obvious errors, but I expect that there are errors of grammar and possibly content that I did not discover before " finalizing the note.      Assessment/Plan  1. Chronic obstructive pulmonary disease, unspecified COPD type (HCC)    - REFERRAL TO PULMONOLOGY    2. Type 2 diabetes mellitus with hyperglycemia, with long-term current use of insulin (HCC)  Will refer for lab results with patient at next appointment.  - HEMOGLOBIN A1C; Future  - Comp Metabolic Panel; Future  - Lipid Profile; Future    3. Hypothyroidism, unspecified type  We will review lab results with patient at next appointment.  - TSH; Future    4. Neck pain    - REFERRAL TO HOME HEALTH  - DX-CERVICAL SPINE-2 OR 3 VIEWS; Future  - Consent for Opiate Prescription    5. Acute pain of right shoulder    - REFERRAL TO HOME HEALTH  - DX-SHOULDER 2+ RIGHT; Future  - oxyCODONE immediate-release (ROXICODONE) 5 MG Tab; Take one tab once a day as needed for severe neck pain.  May take in addition to oxycodone 10 mg for chronic pain  Dispense: 20 Tab; Refill: 0    6. Hospital discharge follow-up  Patient receiving home health.  Has follow-up appointment with orthopedics next month.  Patient will call to schedule follow-up appointment with nephrology.    Patient will follow-up in clinic as previously scheduled for chronic pain on 4/4/19.  She will return to clinic in 3 months with primary care provider for annual wellness visit.  Patient will return to clinic sooner if needed.          Annual Health Assessment Questions:    1.  Are you currently engaging in any exercise or physical activity? No    2.  How would you describe your mood or emotional well-being today? anxious    3.  Have you had any falls in the last year? Yes    4.  Have you noticed any problems with your balance or had difficulty walking? Yes    5.  In the last six months have you experienced any leakage of urine? Yes    6. DPA/Advanced Directive: Patient has Durable Power of , but it is not on file. Instructed to bring in a copy to scan into their chart.

## 2019-02-27 NOTE — ASSESSMENT & PLAN NOTE
Patient is here for hospital discharge follow-up.  She was admitted to [facility] from 2/8/19 to 2/14/19 for distal fibula fracture.  Orthopedic consult deemed at nonsurgical.  Patient was discharged home with home health.  She has been receiving physical therapy and home health aide.  Patient has follow-up appointment with orthopedist on 3/20/19.

## 2019-02-27 NOTE — ASSESSMENT & PLAN NOTE
This is chronic health problem for patient.  She is using Symbicort inhaler routinely, Atrovent routinely every 6 hours.  Patient is mildly short of breath while resting during exam today.  She has supplemental oxygen.  She has not seen pulmonology in several years.  Will refer to pulmonology for further evaluation.

## 2019-02-27 NOTE — ASSESSMENT & PLAN NOTE
Patient reports new onset of neck pain and right shoulder pain approximately 4 weeks ago.  Denies injury.  Reports pain starts at neck and radiates down her shoulder to her mid right arm.  Aggravating factors include turning head to the right and abducting arm.  Patient has been taking oxycodone that she takes for chronic pain with minimal relief.  She has not used heat or ice.  Recommended patient to use TENS unit, heat, ice.  Will also refer to home health physical therapy for neck pain and shoulder pain.  Will get cervical spine and left shoulder x-ray.  Patient has chronic back pain and takes oxycodone 10 mg 3 times a day for chronic pain.  She reports she has been taking extra tabs per day for acute right shoulder and neck pain.  Will prescribe temporary prescription of oxycodone 5 mg to take for severe neck and shoulder pain up to once a day in addition to which she takes for chronic pain.  I explained to patient that pharmacy may not fill this as they have levels about how often they can refill narcotics.  Patient verbalized understanding.

## 2019-02-28 NOTE — TELEPHONE ENCOUNTER
Was the patient seen in the last year in this department? Yes    Does patient have an active prescription for medications requested? No     Received Request Via: Pharmacy      Pt met protocol?: Yes    OV 2/19    BP Readings from Last 1 Encounters:   02/26/19 130/76

## 2019-03-01 ENCOUNTER — PATIENT OUTREACH (OUTPATIENT)
Dept: HEALTH INFORMATION MANAGEMENT | Facility: OTHER | Age: 69
End: 2019-03-01

## 2019-03-01 RX ORDER — AMLODIPINE BESYLATE 10 MG/1
10 TABLET ORAL DAILY
Qty: 30 TAB | Refills: 1 | Status: SHIPPED | OUTPATIENT
Start: 2019-03-01 | End: 2019-05-08 | Stop reason: SDUPTHER

## 2019-03-04 NOTE — DOCUMENTATION QUERY
[facility]                                                                         Query Response Note      PATIENT:               CHAITANYA CABELLO  ACCT #:                  [id number]  MRN:                       [id number]  :                       1950  ADMIT DATE:       2019 11:28 AM  DISCH DATE:        2019 11:31 AM  RESPONDING  PROVIDER #:        [id number]           RESPONSE TEXT:    Major depression- recurrent in partial remission    QUERY TEXT:    Depression Type [id number]    Depression is documented in the Medical Record.  Please specify the type.    NOTE:  If an appropriate response is not listed below, please respond with a new note.          The patient's Clinical Indicators include:  Major depressive disorder (CMS-HCC)- (present on admission)  Assessment & Plan    Stable on Cymbalta  Query created by: Thania Alberts on 2019 9:57 AM        Electronically signed by:  CHRISS HOWARD DO 3/3/2019 10:28 PM

## 2019-03-05 RX ORDER — LEVOTHYROXINE SODIUM 0.07 MG/1
TABLET ORAL
Qty: 90 TAB | Refills: 3 | Status: SHIPPED | OUTPATIENT
Start: 2019-03-05 | End: 2020-01-03

## 2019-03-05 NOTE — TELEPHONE ENCOUNTER
Was the patient seen in the last year in this department? Yes    Does patient have an active prescription for medications requested? No     Received Request Via: Pharmacy      Pt met protocol?: Yes    OV 2/19     TSH 2/19

## 2019-03-07 NOTE — PROGRESS NOTES
Patient Priya Callahan was admitted to [facility] on 1/22/19 for hyperkalemia.  The patient was discharged on 1/30/19 and instructed to follow up with her PCP and Nephrology.  The patient successfully filled her discharge medication and [facility] began service on 2/2/19.  The patient was scheduled to follow up with her PCP on 2/4/19, however, the provider rescheduled this appointment and the patient was seen on 2/26/19.  The patient declined to schedule with Nephrology at this time.  The patient is scheduled for 2 future appointments with her PCP on 4/4/19 and 6/5/19.  PPS 70%.

## 2019-03-13 RX ORDER — BUDESONIDE AND FORMOTEROL FUMARATE DIHYDRATE 160; 4.5 UG/1; UG/1
AEROSOL RESPIRATORY (INHALATION)
Qty: 30.6 G | Refills: 0 | Status: ON HOLD | OUTPATIENT
Start: 2019-03-13 | End: 2019-04-19

## 2019-03-20 RX ORDER — CYCLOBENZAPRINE HCL 5 MG
TABLET ORAL
Qty: 60 TAB | Refills: 0 | Status: SHIPPED | OUTPATIENT
Start: 2019-03-20 | End: 2019-06-03

## 2019-03-20 NOTE — TELEPHONE ENCOUNTER
Was the patient seen in the last year in this department? Yes    Does patient have an active prescription for medications requested? No     Received Request Via: Pharmacy    Pt met protocol?: Yes     Last OV 02/2019

## 2019-03-26 RX ORDER — AMLODIPINE BESYLATE 10 MG/1
TABLET ORAL
Refills: 0 | OUTPATIENT
Start: 2019-03-26

## 2019-03-26 NOTE — TELEPHONE ENCOUNTER
Was the patient seen in the last year in this department? Yes    Does patient have an active prescription for medications requested? No     Received Request Via: Pharmacy      Pt met protocol?: Yes, OV last month, Amlodipine too soon

## 2019-04-04 ENCOUNTER — OFFICE VISIT (OUTPATIENT)
Dept: MEDICAL GROUP | Facility: PHYSICIAN GROUP | Age: 69
End: 2019-04-04
Payer: MEDICARE

## 2019-04-04 VITALS
WEIGHT: 230 LBS | OXYGEN SATURATION: 94 % | HEART RATE: 64 BPM | HEIGHT: 65 IN | RESPIRATION RATE: 12 BRPM | BODY MASS INDEX: 38.32 KG/M2 | SYSTOLIC BLOOD PRESSURE: 126 MMHG | DIASTOLIC BLOOD PRESSURE: 78 MMHG | TEMPERATURE: 97.7 F

## 2019-04-04 DIAGNOSIS — M47.16 LUMBAR SPONDYLOSIS WITH MYELOPATHY: ICD-10-CM

## 2019-04-04 DIAGNOSIS — F33.1 MODERATE EPISODE OF RECURRENT MAJOR DEPRESSIVE DISORDER (HCC): Chronic | ICD-10-CM

## 2019-04-04 DIAGNOSIS — Z79.4 TYPE 2 DIABETES MELLITUS WITH HYPERGLYCEMIA, WITH LONG-TERM CURRENT USE OF INSULIN (HCC): ICD-10-CM

## 2019-04-04 DIAGNOSIS — Z79.891 CHRONIC USE OF OPIATE DRUGS THERAPEUTIC PURPOSES: ICD-10-CM

## 2019-04-04 DIAGNOSIS — J96.11 CHRONIC RESPIRATORY FAILURE WITH HYPOXIA (HCC): ICD-10-CM

## 2019-04-04 DIAGNOSIS — E11.65 TYPE 2 DIABETES MELLITUS WITH HYPERGLYCEMIA, WITH LONG-TERM CURRENT USE OF INSULIN (HCC): ICD-10-CM

## 2019-04-04 DIAGNOSIS — G89.4 CHRONIC PAIN SYNDROME: Chronic | ICD-10-CM

## 2019-04-04 DIAGNOSIS — M15.9 PRIMARY OSTEOARTHRITIS INVOLVING MULTIPLE JOINTS: ICD-10-CM

## 2019-04-04 DIAGNOSIS — N18.30 CHRONIC KIDNEY DISEASE, STAGE 3 (HCC): ICD-10-CM

## 2019-04-04 DIAGNOSIS — J44.9 CHRONIC OBSTRUCTIVE PULMONARY DISEASE, UNSPECIFIED COPD TYPE (HCC): Chronic | ICD-10-CM

## 2019-04-04 PROCEDURE — 99214 OFFICE O/P EST MOD 30 MIN: CPT | Performed by: NURSE PRACTITIONER

## 2019-04-04 RX ORDER — OXYCODONE HYDROCHLORIDE 10 MG/1
10 TABLET ORAL EVERY 4 HOURS PRN
Qty: 90 TAB | Refills: 0 | Status: SHIPPED | OUTPATIENT
Start: 2019-05-09 | End: 2019-04-04 | Stop reason: SDUPTHER

## 2019-04-04 RX ORDER — OXYCODONE HYDROCHLORIDE 10 MG/1
10 TABLET ORAL EVERY 4 HOURS PRN
Qty: 90 TAB | Refills: 0 | Status: SHIPPED | OUTPATIENT
Start: 2019-04-09 | End: 2019-04-04 | Stop reason: SDUPTHER

## 2019-04-04 RX ORDER — OXYCODONE HYDROCHLORIDE 10 MG/1
10 TABLET ORAL EVERY 4 HOURS PRN
Qty: 90 TAB | Refills: 0 | Status: SHIPPED | OUTPATIENT
Start: 2019-05-09 | End: 2019-06-26 | Stop reason: SDUPTHER

## 2019-04-04 RX ORDER — LANCETS 30 GAUGE
EACH MISCELLANEOUS
Qty: 100 EACH | Refills: 0 | Status: SHIPPED | OUTPATIENT
Start: 2019-04-04 | End: 2019-04-14

## 2019-04-04 ASSESSMENT — PAIN SCALES - GENERAL: PAINLEVEL: NO PAIN

## 2019-04-04 NOTE — PROGRESS NOTES
CC: Pain medication management    HISTORY OF THE PRESENT ILLNESS: Patient is a 68 y.o. female. This pleasant patient is here today for evaluation management following health problems.  Patient's primary care provider is Dr. Mccall.      Major depressive disorder (CMS-HCC)  Chronic health problem, well-controlled with duloxetine 60 mg daily.  Patient reports today is a particularly hard day though has this is the anniversary of her son's death 12 years ago.  Patient reports typically she feels more positive.  Patient does try to socialize with friends, though it is difficult that she has chronic pain and has a hard time getting around.  She is looking into assisted living so she can have a more social atmosphere.  Denies SI/HI.      COPD (chronic obstructive pulmonary disease) ([facility])  Chronic health problem, stable.  And is by pulmonology.  Patient has upcoming appointment with pulmonology on 4/22/19.  Uses supplemental oxygen.  Uses Symbicort inhaler.  Reports Atrovent inhaler leads a bad taste in her mouth.    Chronic kidney disease, stage 3 ([facility])  Chronic health problem.  Stable.  Updated labs are needed.  Managed by nephrology.  Patient has appointment with nephrology on 4/30/19.    Chronic respiratory failure ([facility])  Stable peer patient uses supplemental oxygen.  Has COPD.  Followed by pulmonology.    Type 2 diabetes mellitus with hyperglycemia, with long-term current use of insulin ([facility])  Chronic problem.  Stable.  Uses Lantus at bedtime.  Last hemoglobin A1c is 6.    Chronic use of opiate drugs therapeutic purposes  Patient has chronic pain from arthritis and spondylosis.  She presents today for refill of oxycodone.  She takes 10 mg 3 times a day.  She has been unable to wean down further as pain continues.  Patient also uses a massager with heat, ice with some relief.  Pain is aggravated by ambulation.  Laying flat is most comfortable.    Patient also takes Flexeril, Cymbalta, Elavil, Tylenol.  She does not  drink alcohol.   and UDS reviewed, no concerns.  Reviewed risks of opioids with patient including respiratory depression.        Allergies: Vitamin c; Keflex; Codeine; Gabapentin; Lyrica; and Sudafed    Current Outpatient Prescriptions Ordered in [facility]   Medication Sig Dispense Refill   • [START ON 5/9/2019] oxyCODONE immediate release (ROXICODONE) 10 MG immediate release tablet Take 1 Tab by mouth every four hours as needed for Moderate Pain for up to 30 days. 90 Tab 0   • Blood Glucose Meter Kit Test blood sugar as recommended by provider. Freestyle Ronny blood glucose monitoring kit. 1 Kit 0   • Blood Glucose Test Strips Use one Freestyle Ronny strip to test blood sugar twice daily. 100 Strip 0   • Lancets Use one Freestyle Ronny lancet to test blood sugar twice daily. 100 Each 0   • cyclobenzaprine (FLEXERIL) 5 MG tablet TAKE ONE TO TWO TABLETS BY MOUTH THREE TIMES DAILY AS NEEDED 60 Tab 0   • SYMBICORT 160-4.5 MCG/ACT Aerosol INHALE 2 PUFFS BY MOUTH TWO TIMES DAILY 30.6 g 0   • levothyroxine (SYNTHROID) 75 MCG Tab TAKE 1 TABLET BY MOUTH DAILY 90 Tab 3   • amLODIPine (NORVASC) 10 MG Tab Take 1 Tab by mouth every day. 30 Tab 1   • amitriptyline (ELAVIL) 50 MG Tab   3   • LANTUS SOLOSTAR 100 UNIT/ML Solution Pen-injector injection INJECT 5-20 UNITS SUBCUTANEOUSLY EVERY EVENING 15 mL 2   • acetaminophen (TYLENOL) 325 MG Tab Take 2 Tabs by mouth every 6 hours as needed (Mild Pain; (Pain scale 1-3); Temp greater than 100.5 F). 30 Tab 0   • traZODone (DESYREL) 50 MG Tab Take 150 mg by mouth every evening.     • patiromer (VELTASSA) 8.4 g Pack Take 8.4 g by mouth every day. 30 Each 1   • DULoxetine (CYMBALTA) 60 MG Cap DR Particles delayed-release capsule TAKE 1 CAPSULE BY MOUTH DAILY 90 Cap 1   • rosuvastatin (CRESTOR) 10 MG Tab Take 1 Tab by mouth every evening. 90 Tab 3   • lactulose 10 GM/15ML Solution TAKE 30 MLS (6 TEASPOONFULS) BY MOUTH TWO TIMES DAILY 473 mL 0   • Nutritional Supplements (ANTIOXIDANTS PO)  "Take 1 Tab by mouth every day.     • ipratropium (ATROVENT) 17 MCG/ACT Aero Soln Inhale 2 Puffs by mouth every 6 hours. 17 g 11   • varenicline (CHANTIX CONTINUING MONTH PAK) 1 MG tablet Take 1 Tab by mouth 2 times a day. 60 Tab 3   • Diclofenac Sodium 1 % Gel Apply 2 g to skin as directed 2 times a day as needed.     • nystatin/triamcinolone (MYCOLOG) 835848-4.1 UNIT/GM-% Cream APPLY A THIN LAYER TO FUNGAL RASH ONCE DAILY AS NEEDED 30 g 0     No current Epic-ordered facility-administered medications on file.        Past Medical History:   Diagnosis Date   • Arthritis     \"everywhere\"   • ASTHMA    • Backpain     chronic back pain   • Breath shortness     \"only with flare up with allergies\"   • Bronchitis     as a child   • Congestive heart failure (HCC) 2013    related to pulmonary failure   • COPD    • Diabetes     Type 2   • Disorder of thyroid     Hypothyroid   • Fall    • Fibromyalgia    • GERD (gastroesophageal reflux disease)    • Heart burn    • Hepatitis C     \"I have markers in blood but not active\"   • Hypertension    • Indigestion    • Infectious disease     Hepatitis C   • Neuropathy (HCC)     bilat feet   • On home oxygen therapy    • Other specified symptom associated with female genital organs     Hysterectomy at age 22yrs   • Pain 9/13/2016    \"pain everywhere\"   • PND (post-nasal drip)    • Pneumonia     as a child   • Psychiatric problem 9/13/2016    PTSD   • RLS (restless legs syndrome)    • Sleep apnea     does not wear CPAP, \"can't do it\"   • Unspecified urinary incontinence        Past Surgical History:   Procedure Laterality Date   • ANKLE ORIF Left 5/8/2017    Procedure: ANKLE ORIF;  Surgeon: Rico Gtz M.D.;  Location: SURGERY [facility] ORS;  Service:    • ME DSTR NROLYTC AGNT PARVERTEB FCT SNGL LMBR/SACRAL Left 9/16/2016    Procedure: NEURO DEST FACET L/S W/IG SNGL - L3-S1;  Surgeon: Xavier Arteaga D.O.;  Location: SURGERY [facility] ORS;  Service: Pain Management   • ME " DSTR NROLYTC AGNT PARVERTEB FCT ADDL LMBR/SACRAL  9/16/2016    Procedure: NEURO DEST FACET L/S W/IG ADDL;  Surgeon: Xavier Arteaga D.O.;  Location: SURGERY [facility] ORS;  Service: Pain Management   • RI DSTR NROLYTC AGNT PARVERTEB FCT ADDL LMBR/SACRAL  9/16/2016    Procedure: NEURO DEST FACET L/S W/IG ADDL;  Surgeon: Xavier Arteaga D.O.;  Location: SURGERY [facility] ORS;  Service: Pain Management   • PB FLUOROSCOPIC GUIDANCE NEEDLE PLACEMENT  9/16/2016    Procedure: FLOURO GUIDE NEEDLE PLACEMENT;  Surgeon: Xavier Arteaga D.O.;  Location: SURGERY [facility] ORS;  Service: Pain Management   • RI DSTR NROLYTC AGNT PARVERTEB FCT SNGL LMBR/SACRAL Right 11/6/2015    Procedure: NEURO DEST FACET L/S W/IG SNGL - L3-S1;  Surgeon: Xavier Arteaga D.O.;  Location: SURGERY [facility] ORS;  Service: Pain Management   • RI DSTR NROLYTC AGNT PARVERTEB FCT ADDL LMBR/SACRAL  11/6/2015    Procedure: NEURO DEST FACET L/S W/IG ADDL;  Surgeon: Xavier Arteaga D.O.;  Location: SURGERY [facility] ORS;  Service: Pain Management   • PB INJECT RX OTHER PERIPH NERVE  11/6/2015    Procedure: NEUROLYTIC DEST-OTHER NERVE;  Surgeon: Xavier Arteaga D.O.;  Location: SURGERY [facility] ORS;  Service: Pain Management   • RI DSTR NROLYTC AGNT PARVERTEB FCT ADDL LMBR/SACRAL  11/6/2015    Procedure: NEURO DEST FACET L/S W/IG ADDL;  Surgeon: Xavier Arteaga D.O.;  Location: SURGERY SURG[facility] ORS;  Service: Pain Management   • RI DSTR NROLYTC AGNT PARVERTEB FCT SNGL LMBR/SACRAL Left 10/2/2015    Procedure: NEURO DEST FACET L/S W/IG SNGL - L3-S1;  Surgeon: Xavier Arteaga D.O.;  Location: SURGERY [facility] ORS;  Service: Pain Management   • RI DSTR NROLYTC AGNT PARVERTEB FCT ADDL LMBR/SACRAL  10/2/2015    Procedure: NEURO DEST FACET L/S W/IG ADDL;  Surgeon: Xavier Arteaga D.O.;  Location: SUR[facility]ERY [facility];  Service: Pain Management   • PB INJECT RX OTHER PERIPH NERVE  10/2/2015     Procedure: NEUROLYTIC DEST-OTHER NERVE;  Surgeon: Xavier Arteaga D.O.;  Location: SURGERY SURGICAL [facility] ORS;  Service: Pain Management   • VA DSTR NROLYTC AGNT PARVERTEB FCT ADDL LMBR/SACRAL  10/2/2015    Procedure: NEURO DEST FACET L/S W/IG ADDL;  Surgeon: Xavier Arteaga D.O.;  Location: SURGERY SURGICAL [facility] ORS;  Service: Pain Management   • PB INJ DX/THER AGNT PARAVERT FACET JOINT, OBED* Left 7/17/2015    Procedure: INJ PARA FACET L/S 1 LVL W/IG - L3-S1;  Surgeon: Xavier Arteaga D.O.;  Location: SURGERY SURGICAL [facility] ORS;  Service: Pain Management   • PB INJ DX/THER AGNT PARAVERT FACET JOINT, OBED*  7/17/2015    Procedure: INJ PARA FACET L/S 2D LVL W/IG;  Surgeon: Xavier Arteaga D.O.;  Location: SURGERY [facility] ORS;  Service: Pain Management   • PB INJ DX/THER AGNT PARAVERT FACET JOINT, OBED*  7/17/2015    Procedure: INJ PARA FACET L/S 3D LVL W/IG;  Surgeon: Xavier Arteaga D.O.;  Location: SURGERY SURGICAL [facility] ORS;  Service: Pain Management   • PB INJECT NERV BLCK,OTHR PERIPH NERV  7/17/2015    Procedure: INJ-ANES AGENT-OTHER PHER.NRVE;  Surgeon: Xavier Arteaga D.O.;  Location: SURGERY SURGICAL [facility] ORS;  Service: Pain Management   • PB INJ DX/THER AGNT PARAVERT FACET JOINT, OBED* Left 7/10/2015    Procedure: INJ PARA FACET L/S 1 LVL W/IG - L3-S1;  Surgeon: Xavier Arteaga D.O.;  Location: SURGERY [facility] ORS;  Service: Pain Management   • PB INJ DX/THER AGNT PARAVERT FACET JOINT, OBED*  7/10/2015    Procedure: INJ PARA FACET L/S 2D LVL W/IG;  Surgeon: Xavier Arteaga D.O.;  Location: SURGERY SURGICAL [facility] ORS;  Service: Pain Management   • PB INJ DX/THER AGNT PARAVERT FACET JOINT, OBED*  7/10/2015    Procedure: INJ PARA FACET L/S 3D LVL W/IG;  Surgeon: Xavier Arteaga D.O.;  Location: [facility]ERY [facility];  Service: Pain Management   • PB INJECT NERV BLCK,OTHR PERIPH NERV  7/10/2015    Procedure: INJ-ANES AGENT-OTHER PHER.NRVE;  Surgeon: Xavier Arteaga D.O.;  Location:  "[facility]ERY [facility];  Service: Pain Management   • GYN SURGERY      hysterectomy    • LUMPECTOMY Left     Left breast   • OTHER ORTHOPEDIC SURGERY      L knee replacement    • OTHER ORTHOPEDIC SURGERY      R carpal tunnel    • US-NEEDLE CORE BX-BREAST PANEL         Social History   Substance Use Topics   • Smoking status: Current Every Day Smoker     Packs/day: 1.00     Years: 50.00     Types: Cigarettes     Last attempt to quit: 3/12/2018   • Smokeless tobacco: Never Used   • Alcohol use No       Family History   Problem Relation Age of Onset   • Lung Disease Mother    • Alcohol/Drug Mother    • Heart Disease Mother    • Cancer Father    • Cancer Brother    • Diabetes Brother    • Diabetes Maternal Grandmother    • Stroke Paternal Grandmother    • Heart Disease Paternal Grandmother        ROS:    As in HPI, otherwise negative for chest pain, dyspnea, abdominal pain, dysuria, blood in stool, fever        Exam: /78 (BP Location: Left arm, Patient Position: Sitting, BP Cuff Size: Adult)   Pulse 64   Temp 36.5 °C (97.7 °F) (Temporal)   Resp 12   Ht 1.651 m (5' 5\")   Wt 104.3 kg (230 lb)   SpO2 94%  Body mass index is 38.27 kg/m².    General: Alert, pleasant, obese habitus, well nourished, well developed female in NAD  HEENT: Normocephalic. Eyes conjunctiva clear lids without ptosis, pupils equal and reactive to light, ears normal shape and contour, canals are clear bilaterally, tympanic membranes are pearly gray with good light reflex, nasal mucosa without erythema and drainage, oropharynx is without erythema, edema or exudates.   Neck: Supple without bruit. Thyroid is not enlarged.  Pulmonary: Clear to ausculation.  Mild effort. No rales, ronchi, or wheezing.  Supplemental oxygen via nasal cannula.  Cardiovascular: Normal rate and rhythm with murmur. Carotid and radial pulses are intact and equal bilaterally.  No lower extremity edema.  Abdomen: Soft, nontender, nondistended. Normal bowel sounds. " Liver and spleen are not palpable  Neurologic: Grossly nonfocal  Lymph: No cervical or supraclavicular lymph nodes are palpable  Skin: Warm and dry.  No obvious lesions.  Musculoskeletal: Wheelchair, walking boot on right foot..   Psych: Normal mood and affect. Alert and oriented. Judgment and insight is normal.    Please note that this dictation was created using voice recognition software. I have made every reasonable attempt to correct obvious errors, but I expect that there are errors of grammar and possibly content that I did not discover before finalizing the note.      Assessment/Plan  1. Moderate episode of recurrent major depressive disorder (HCC)  Continue with duloxetine.  Offered counseling, patient declined.    2. Chronic obstructive pulmonary disease, unspecified COPD type (HCC)  Continue with inhalers and follow-up with pulmonology.    3. Chronic pain syndrome  Prescriptions for oxycodone for 3-month supply given to patient today.  - oxyCODONE immediate release (ROXICODONE) 10 MG immediate release tablet; Take 1 Tab by mouth every four hours as needed for Moderate Pain for up to 30 days.  Dispense: 90 Tab; Refill: 0  This patient is continuing to use a controlled substance (CS) on a long term basis.  The patient is thoroughly aware of the goals of treatment with the CS  The patient is aware that yearly and random urine drug screens are required.  The patient has been instructed to take the CS only as prescribed.  The patient is prohibited from sharing the CS with any other person.  The patient is instructed to inform the provider if any other CS is taken, of any alcohol or cannabis or other recreational drug use, any treatment for side effects of the CS or complications, if they have CS active rx in other states  The patient has evidence for a reason for the CS  The treatment plan has been discussed with the patient  The  report has been reviewed        4. Primary osteoarthritis involving multiple  joints    - oxyCODONE immediate release (ROXICODONE) 10 MG immediate release tablet; Take 1 Tab by mouth every four hours as needed for Moderate Pain for up to 30 days.  Dispense: 90 Tab; Refill: 0    5. Lumbar spondylosis with myelopathy    - oxyCODONE immediate release (ROXICODONE) 10 MG immediate release tablet; Take 1 Tab by mouth every four hours as needed for Moderate Pain for up to 30 days.  Dispense: 90 Tab; Refill: 0    6. Chronic kidney disease, stage 3 ([facility])  Continue follow-up with nephrology.    7. Chronic respiratory failure with hypoxia ([facility])  Continue supplemental oxygen.  Continue follow-up with pulmonology.    8. Type 2 diabetes mellitus with hyperglycemia, with long-term current use of insulin ([facility])  Patient requesting freestyle ronny glucometer.  She understands that insurance may not cover this.  - Blood Glucose Meter Kit; Test blood sugar as recommended by provider. Freestyle Ronny blood glucose monitoring kit.  Dispense: 1 Kit; Refill: 0  - Blood Glucose Test Strips; Use one Freestyle Ronny strip to test blood sugar twice daily.  Dispense: 100 Strip; Refill: 0  - Lancets; Use one Freestyle Ronny lancet to test blood sugar twice daily.  Dispense: 100 Each; Refill: 0    9. Chronic use of opiate drugs therapeutic purposes      Patient will return to clinic in 3 months with primary care provider, Dr. Mccall, for pain management and other health problems.

## 2019-04-14 ENCOUNTER — APPOINTMENT (OUTPATIENT)
Dept: RADIOLOGY | Facility: MEDICAL CENTER | Age: 69
DRG: 190 | End: 2019-04-14
Attending: EMERGENCY MEDICINE
Payer: MEDICARE

## 2019-04-14 ENCOUNTER — HOSPITAL ENCOUNTER (INPATIENT)
Facility: MEDICAL CENTER | Age: 69
LOS: 5 days | DRG: 190 | End: 2019-04-19
Attending: EMERGENCY MEDICINE | Admitting: HOSPITALIST
Payer: MEDICARE

## 2019-04-14 DIAGNOSIS — S82.821A CLOSED TORUS FRACTURE OF DISTAL END OF RIGHT FIBULA, INITIAL ENCOUNTER: ICD-10-CM

## 2019-04-14 DIAGNOSIS — J44.9 CHRONIC OBSTRUCTIVE PULMONARY DISEASE, UNSPECIFIED COPD TYPE (HCC): Chronic | ICD-10-CM

## 2019-04-14 DIAGNOSIS — G89.4 CHRONIC PAIN SYNDROME: Chronic | ICD-10-CM

## 2019-04-14 DIAGNOSIS — M19.90 ARTHRITIS: ICD-10-CM

## 2019-04-14 DIAGNOSIS — M54.2 NECK PAIN: ICD-10-CM

## 2019-04-14 DIAGNOSIS — J44.1 ACUTE EXACERBATION OF CHRONIC OBSTRUCTIVE PULMONARY DISEASE (COPD) (HCC): ICD-10-CM

## 2019-04-14 DIAGNOSIS — E66.9 OBESITY (BMI 30-39.9): ICD-10-CM

## 2019-04-14 DIAGNOSIS — G62.9 NEUROPATHY: ICD-10-CM

## 2019-04-14 DIAGNOSIS — J96.21 ACUTE ON CHRONIC RESPIRATORY FAILURE WITH HYPOXIA (HCC): ICD-10-CM

## 2019-04-14 DIAGNOSIS — E55.9 VITAMIN D DEFICIENCY: Chronic | ICD-10-CM

## 2019-04-14 DIAGNOSIS — I27.20 PULMONARY HTN (HCC): ICD-10-CM

## 2019-04-14 DIAGNOSIS — R29.6 FREQUENT FALLS: ICD-10-CM

## 2019-04-14 DIAGNOSIS — G47.33 OSA (OBSTRUCTIVE SLEEP APNEA): Chronic | ICD-10-CM

## 2019-04-14 LAB
ALBUMIN SERPL BCP-MCNC: 3.7 G/DL (ref 3.2–4.9)
ALBUMIN/GLOB SERPL: 1.3 G/DL
ALP SERPL-CCNC: 46 U/L (ref 30–99)
ALT SERPL-CCNC: 19 U/L (ref 2–50)
ANION GAP SERPL CALC-SCNC: 7 MMOL/L (ref 0–11.9)
APPEARANCE UR: CLEAR
AST SERPL-CCNC: 23 U/L (ref 12–45)
BACTERIA #/AREA URNS HPF: NEGATIVE /HPF
BASOPHILS # BLD AUTO: 0.4 % (ref 0–1.8)
BASOPHILS # BLD: 0.03 K/UL (ref 0–0.12)
BILIRUB SERPL-MCNC: 0.3 MG/DL (ref 0.1–1.5)
BILIRUB UR QL STRIP.AUTO: NEGATIVE
BNP SERPL-MCNC: 77 PG/ML (ref 0–100)
BUN SERPL-MCNC: 24 MG/DL (ref 8–22)
CALCIUM SERPL-MCNC: 8.6 MG/DL (ref 8.5–10.5)
CHLORIDE SERPL-SCNC: 103 MMOL/L (ref 96–112)
CO2 SERPL-SCNC: 24 MMOL/L (ref 20–33)
COLOR UR: YELLOW
CREAT SERPL-MCNC: 1.53 MG/DL (ref 0.5–1.4)
EKG IMPRESSION: NORMAL
EOSINOPHIL # BLD AUTO: 0.04 K/UL (ref 0–0.51)
EOSINOPHIL NFR BLD: 0.6 % (ref 0–6.9)
EPI CELLS #/AREA URNS HPF: ABNORMAL /HPF
ERYTHROCYTE [DISTWIDTH] IN BLOOD BY AUTOMATED COUNT: 50.4 FL (ref 35.9–50)
FLUAV RNA SPEC QL NAA+PROBE: NEGATIVE
FLUBV RNA SPEC QL NAA+PROBE: NEGATIVE
GLOBULIN SER CALC-MCNC: 2.8 G/DL (ref 1.9–3.5)
GLUCOSE BLD-MCNC: 379 MG/DL (ref 65–99)
GLUCOSE SERPL-MCNC: 185 MG/DL (ref 65–99)
GLUCOSE UR STRIP.AUTO-MCNC: 100 MG/DL
HCT VFR BLD AUTO: 30.2 % (ref 37–47)
HGB BLD-MCNC: 9.1 G/DL (ref 12–16)
HYALINE CASTS #/AREA URNS LPF: ABNORMAL /LPF
IMM GRANULOCYTES # BLD AUTO: 0.06 K/UL (ref 0–0.11)
IMM GRANULOCYTES NFR BLD AUTO: 0.9 % (ref 0–0.9)
KETONES UR STRIP.AUTO-MCNC: NEGATIVE MG/DL
LACTATE BLD-SCNC: 1.3 MMOL/L (ref 0.5–2)
LEUKOCYTE ESTERASE UR QL STRIP.AUTO: NEGATIVE
LYMPHOCYTES # BLD AUTO: 0.68 K/UL (ref 1–4.8)
LYMPHOCYTES NFR BLD: 9.8 % (ref 22–41)
MCH RBC QN AUTO: 29.3 PG (ref 27–33)
MCHC RBC AUTO-ENTMCNC: 30.1 G/DL (ref 33.6–35)
MCV RBC AUTO: 97.1 FL (ref 81.4–97.8)
MICRO URNS: ABNORMAL
MONOCYTES # BLD AUTO: 0.52 K/UL (ref 0–0.85)
MONOCYTES NFR BLD AUTO: 7.5 % (ref 0–13.4)
NEUTROPHILS # BLD AUTO: 5.62 K/UL (ref 2–7.15)
NEUTROPHILS NFR BLD: 80.8 % (ref 44–72)
NITRITE UR QL STRIP.AUTO: NEGATIVE
NRBC # BLD AUTO: 0 K/UL
NRBC BLD-RTO: 0 /100 WBC
PH UR STRIP.AUTO: 5 [PH]
PLATELET # BLD AUTO: 163 K/UL (ref 164–446)
PMV BLD AUTO: 9.1 FL (ref 9–12.9)
POTASSIUM SERPL-SCNC: 4.6 MMOL/L (ref 3.6–5.5)
PROT SERPL-MCNC: 6.5 G/DL (ref 6–8.2)
PROT UR QL STRIP: 300 MG/DL
RBC # BLD AUTO: 3.11 M/UL (ref 4.2–5.4)
RBC # URNS HPF: ABNORMAL /HPF
RBC UR QL AUTO: ABNORMAL
SODIUM SERPL-SCNC: 134 MMOL/L (ref 135–145)
SP GR UR STRIP.AUTO: 1.01
UROBILINOGEN UR STRIP.AUTO-MCNC: 0.2 MG/DL
WBC # BLD AUTO: 7 K/UL (ref 4.8–10.8)
WBC #/AREA URNS HPF: ABNORMAL /HPF

## 2019-04-14 PROCEDURE — 99407 BEHAV CHNG SMOKING > 10 MIN: CPT

## 2019-04-14 PROCEDURE — 83605 ASSAY OF LACTIC ACID: CPT

## 2019-04-14 PROCEDURE — 96375 TX/PRO/DX INJ NEW DRUG ADDON: CPT

## 2019-04-14 PROCEDURE — 770020 HCHG ROOM/CARE - TELE (206)

## 2019-04-14 PROCEDURE — 700101 HCHG RX REV CODE 250: Performed by: HOSPITALIST

## 2019-04-14 PROCEDURE — 94640 AIRWAY INHALATION TREATMENT: CPT

## 2019-04-14 PROCEDURE — 82962 GLUCOSE BLOOD TEST: CPT

## 2019-04-14 PROCEDURE — 96365 THER/PROPH/DIAG IV INF INIT: CPT

## 2019-04-14 PROCEDURE — 700111 HCHG RX REV CODE 636 W/ 250 OVERRIDE (IP): Performed by: HOSPITALIST

## 2019-04-14 PROCEDURE — 83880 ASSAY OF NATRIURETIC PEPTIDE: CPT

## 2019-04-14 PROCEDURE — 700111 HCHG RX REV CODE 636 W/ 250 OVERRIDE (IP): Performed by: EMERGENCY MEDICINE

## 2019-04-14 PROCEDURE — 93005 ELECTROCARDIOGRAM TRACING: CPT | Performed by: EMERGENCY MEDICINE

## 2019-04-14 PROCEDURE — 71045 X-RAY EXAM CHEST 1 VIEW: CPT

## 2019-04-14 PROCEDURE — 700105 HCHG RX REV CODE 258: Performed by: EMERGENCY MEDICINE

## 2019-04-14 PROCEDURE — 96368 THER/DIAG CONCURRENT INF: CPT

## 2019-04-14 PROCEDURE — 80053 COMPREHEN METABOLIC PANEL: CPT

## 2019-04-14 PROCEDURE — A9270 NON-COVERED ITEM OR SERVICE: HCPCS | Performed by: HOSPITALIST

## 2019-04-14 PROCEDURE — 87502 INFLUENZA DNA AMP PROBE: CPT

## 2019-04-14 PROCEDURE — 87086 URINE CULTURE/COLONY COUNT: CPT

## 2019-04-14 PROCEDURE — 700102 HCHG RX REV CODE 250 W/ 637 OVERRIDE(OP): Performed by: HOSPITALIST

## 2019-04-14 PROCEDURE — 99223 1ST HOSP IP/OBS HIGH 75: CPT | Mod: AI | Performed by: HOSPITALIST

## 2019-04-14 PROCEDURE — 94760 N-INVAS EAR/PLS OXIMETRY 1: CPT

## 2019-04-14 PROCEDURE — 81001 URINALYSIS AUTO W/SCOPE: CPT

## 2019-04-14 PROCEDURE — 87040 BLOOD CULTURE FOR BACTERIA: CPT

## 2019-04-14 PROCEDURE — 700101 HCHG RX REV CODE 250: Performed by: EMERGENCY MEDICINE

## 2019-04-14 PROCEDURE — 93005 ELECTROCARDIOGRAM TRACING: CPT

## 2019-04-14 PROCEDURE — 304561 HCHG STAT O2

## 2019-04-14 PROCEDURE — 99285 EMERGENCY DEPT VISIT HI MDM: CPT

## 2019-04-14 PROCEDURE — 36415 COLL VENOUS BLD VENIPUNCTURE: CPT

## 2019-04-14 PROCEDURE — 85025 COMPLETE CBC W/AUTO DIFF WBC: CPT

## 2019-04-14 RX ORDER — DULOXETIN HYDROCHLORIDE 60 MG/1
60 CAPSULE, DELAYED RELEASE ORAL DAILY
Status: DISCONTINUED | OUTPATIENT
Start: 2019-04-14 | End: 2019-04-19 | Stop reason: HOSPADM

## 2019-04-14 RX ORDER — AMITRIPTYLINE HYDROCHLORIDE 25 MG/1
50 TABLET, FILM COATED ORAL
Status: DISCONTINUED | OUTPATIENT
Start: 2019-04-14 | End: 2019-04-19 | Stop reason: HOSPADM

## 2019-04-14 RX ORDER — LEVOTHYROXINE SODIUM 0.07 MG/1
75 TABLET ORAL
Status: DISCONTINUED | OUTPATIENT
Start: 2019-04-14 | End: 2019-04-19 | Stop reason: HOSPADM

## 2019-04-14 RX ORDER — AMLODIPINE BESYLATE 10 MG/1
10 TABLET ORAL DAILY
Status: DISCONTINUED | OUTPATIENT
Start: 2019-04-14 | End: 2019-04-19 | Stop reason: HOSPADM

## 2019-04-14 RX ORDER — METHYLPREDNISOLONE SODIUM SUCCINATE 40 MG/ML
40 INJECTION, POWDER, LYOPHILIZED, FOR SOLUTION INTRAMUSCULAR; INTRAVENOUS EVERY 6 HOURS
Status: COMPLETED | OUTPATIENT
Start: 2019-04-14 | End: 2019-04-17

## 2019-04-14 RX ORDER — OXYCODONE HYDROCHLORIDE 10 MG/1
10 TABLET ORAL EVERY 6 HOURS PRN
Status: DISCONTINUED | OUTPATIENT
Start: 2019-04-14 | End: 2019-04-19 | Stop reason: HOSPADM

## 2019-04-14 RX ORDER — FUROSEMIDE 10 MG/ML
20 INJECTION INTRAMUSCULAR; INTRAVENOUS ONCE
Status: COMPLETED | OUTPATIENT
Start: 2019-04-14 | End: 2019-04-14

## 2019-04-14 RX ORDER — POTASSIUM CHLORIDE 20 MEQ/1
20 TABLET, EXTENDED RELEASE ORAL DAILY
Status: DISCONTINUED | OUTPATIENT
Start: 2019-04-15 | End: 2019-04-18

## 2019-04-14 RX ORDER — FUROSEMIDE 20 MG/1
20 TABLET ORAL
Status: DISCONTINUED | OUTPATIENT
Start: 2019-04-15 | End: 2019-04-19 | Stop reason: HOSPADM

## 2019-04-14 RX ORDER — IPRATROPIUM BROMIDE AND ALBUTEROL SULFATE 2.5; .5 MG/3ML; MG/3ML
3 SOLUTION RESPIRATORY (INHALATION)
Status: COMPLETED | OUTPATIENT
Start: 2019-04-14 | End: 2019-04-14

## 2019-04-14 RX ORDER — AZITHROMYCIN 500 MG/1
500 INJECTION, POWDER, LYOPHILIZED, FOR SOLUTION INTRAVENOUS ONCE
Status: COMPLETED | OUTPATIENT
Start: 2019-04-14 | End: 2019-04-14

## 2019-04-14 RX ORDER — INSULIN GLARGINE 100 [IU]/ML
20 INJECTION, SOLUTION SUBCUTANEOUS EVERY EVENING
Status: DISCONTINUED | OUTPATIENT
Start: 2019-04-14 | End: 2019-04-17

## 2019-04-14 RX ORDER — ROSUVASTATIN CALCIUM 10 MG/1
10 TABLET, COATED ORAL EVERY EVENING
Status: DISCONTINUED | OUTPATIENT
Start: 2019-04-14 | End: 2019-04-19 | Stop reason: HOSPADM

## 2019-04-14 RX ORDER — DOXYCYCLINE 100 MG/1
100 TABLET ORAL EVERY 12 HOURS
Status: DISPENSED | OUTPATIENT
Start: 2019-04-14 | End: 2019-04-19

## 2019-04-14 RX ORDER — IPRATROPIUM BROMIDE AND ALBUTEROL SULFATE 2.5; .5 MG/3ML; MG/3ML
3 SOLUTION RESPIRATORY (INHALATION)
Status: DISCONTINUED | OUTPATIENT
Start: 2019-04-14 | End: 2019-04-15

## 2019-04-14 RX ORDER — DEXTROSE MONOHYDRATE 25 G/50ML
25 INJECTION, SOLUTION INTRAVENOUS
Status: DISCONTINUED | OUTPATIENT
Start: 2019-04-14 | End: 2019-04-19 | Stop reason: HOSPADM

## 2019-04-14 RX ORDER — METHYLPREDNISOLONE SODIUM SUCCINATE 125 MG/2ML
125 INJECTION, POWDER, LYOPHILIZED, FOR SOLUTION INTRAMUSCULAR; INTRAVENOUS ONCE
Status: COMPLETED | OUTPATIENT
Start: 2019-04-14 | End: 2019-04-14

## 2019-04-14 RX ADMIN — OXYCODONE HYDROCHLORIDE 10 MG: 10 TABLET ORAL at 11:45

## 2019-04-14 RX ADMIN — DULOXETINE HYDROCHLORIDE 60 MG: 60 CAPSULE, DELAYED RELEASE ORAL at 11:45

## 2019-04-14 RX ADMIN — METHYLPREDNISOLONE SODIUM SUCCINATE 40 MG: 40 INJECTION, POWDER, FOR SOLUTION INTRAMUSCULAR; INTRAVENOUS at 23:41

## 2019-04-14 RX ADMIN — METHYLPREDNISOLONE SODIUM SUCCINATE 40 MG: 40 INJECTION, POWDER, FOR SOLUTION INTRAMUSCULAR; INTRAVENOUS at 11:44

## 2019-04-14 RX ADMIN — IPRATROPIUM BROMIDE AND ALBUTEROL SULFATE 3 ML: .5; 3 SOLUTION RESPIRATORY (INHALATION) at 22:52

## 2019-04-14 RX ADMIN — INSULIN HUMAN 12 UNITS: 100 INJECTION, SOLUTION PARENTERAL at 20:54

## 2019-04-14 RX ADMIN — METHYLPREDNISOLONE SODIUM SUCCINATE 125 MG: 125 INJECTION, POWDER, FOR SOLUTION INTRAMUSCULAR; INTRAVENOUS at 07:27

## 2019-04-14 RX ADMIN — IPRATROPIUM BROMIDE AND ALBUTEROL SULFATE 3 ML: .5; 3 SOLUTION RESPIRATORY (INHALATION) at 07:32

## 2019-04-14 RX ADMIN — IPRATROPIUM BROMIDE AND ALBUTEROL SULFATE 3 ML: .5; 3 SOLUTION RESPIRATORY (INHALATION) at 15:06

## 2019-04-14 RX ADMIN — ROSUVASTATIN CALCIUM 10 MG: 10 TABLET, FILM COATED ORAL at 17:11

## 2019-04-14 RX ADMIN — DOXYCYCLINE 100 MG: 100 TABLET, FILM COATED ORAL at 17:11

## 2019-04-14 RX ADMIN — FUROSEMIDE 20 MG: 10 INJECTION, SOLUTION INTRAMUSCULAR; INTRAVENOUS at 11:44

## 2019-04-14 RX ADMIN — IPRATROPIUM BROMIDE AND ALBUTEROL SULFATE 3 ML: .5; 3 SOLUTION RESPIRATORY (INHALATION) at 19:17

## 2019-04-14 RX ADMIN — METHYLPREDNISOLONE SODIUM SUCCINATE 40 MG: 40 INJECTION, POWDER, FOR SOLUTION INTRAMUSCULAR; INTRAVENOUS at 17:11

## 2019-04-14 RX ADMIN — AMITRIPTYLINE HYDROCHLORIDE 50 MG: 25 TABLET, FILM COATED ORAL at 20:51

## 2019-04-14 RX ADMIN — INSULIN GLARGINE 20 UNITS: 100 INJECTION, SOLUTION SUBCUTANEOUS at 20:56

## 2019-04-14 RX ADMIN — AZITHROMYCIN MONOHYDRATE 500 MG: 500 INJECTION, POWDER, LYOPHILIZED, FOR SOLUTION INTRAVENOUS at 08:11

## 2019-04-14 RX ADMIN — AMLODIPINE BESYLATE 10 MG: 10 TABLET ORAL at 11:45

## 2019-04-14 RX ADMIN — OXYCODONE HYDROCHLORIDE 10 MG: 10 TABLET ORAL at 17:47

## 2019-04-14 RX ADMIN — TRAZODONE HYDROCHLORIDE 150 MG: 50 TABLET ORAL at 20:51

## 2019-04-14 RX ADMIN — INSULIN HUMAN 12 UNITS: 100 INJECTION, SOLUTION PARENTERAL at 17:06

## 2019-04-14 RX ADMIN — CEFTRIAXONE SODIUM 2 G: 2 INJECTION, POWDER, FOR SOLUTION INTRAMUSCULAR; INTRAVENOUS at 07:56

## 2019-04-14 ASSESSMENT — COGNITIVE AND FUNCTIONAL STATUS - GENERAL
MOBILITY SCORE: 20
TOILETING: A LITTLE
STANDING UP FROM CHAIR USING ARMS: A LITTLE
DRESSING REGULAR LOWER BODY CLOTHING: A LITTLE
WALKING IN HOSPITAL ROOM: A LITTLE
DAILY ACTIVITIY SCORE: 19
SUGGESTED CMS G CODE MODIFIER DAILY ACTIVITY: CK
SUGGESTED CMS G CODE MODIFIER MOBILITY: CJ
CLIMB 3 TO 5 STEPS WITH RAILING: A LOT
HELP NEEDED FOR BATHING: A LOT
DRESSING REGULAR UPPER BODY CLOTHING: A LITTLE

## 2019-04-14 ASSESSMENT — LIFESTYLE VARIABLES
ALCOHOL_USE: NO
EVER_SMOKED: YES
EVER_SMOKED: YES

## 2019-04-14 ASSESSMENT — ENCOUNTER SYMPTOMS
FEVER: 1
MUSCULOSKELETAL NEGATIVE: 1
GASTROINTESTINAL NEGATIVE: 1
PSYCHIATRIC NEGATIVE: 1
NEUROLOGICAL NEGATIVE: 1
EYES NEGATIVE: 1
SHORTNESS OF BREATH: 1
CHILLS: 0

## 2019-04-14 ASSESSMENT — COPD QUESTIONNAIRES
COPD SCREENING SCORE: 8
DURING THE PAST 4 WEEKS HOW MUCH DID YOU FEEL SHORT OF BREATH: MOST  OR ALL OF THE TIME
DO YOU EVER COUGH UP ANY MUCUS OR PHLEGM?: YES, A FEW DAYS A WEEK OR MONTH
HAVE YOU SMOKED AT LEAST 100 CIGARETTES IN YOUR ENTIRE LIFE: YES

## 2019-04-14 NOTE — ED PROVIDER NOTES
"ED Provider Note    CHIEF COMPLAINT  Chief Complaint   Patient presents with   • Shortness of Breath       HPI  Priya Callahan is a 68 y.o. female who presents with dyspnea.  Symptoms started about 7 days ago.  Worse over the last 2 days.  Has had associated fever up to 101 at home.  Productive cough of white mucus.  No hemoptysis.  Breathing was much worse today and she required extra oxygen and therefore paramedics were called.  She was hypoxic.  She was given DuoNeb in route.  Slight improvement although still very breathless.  No orthopnea or PND.  [place] like her feet were a bit swollen, but they look better now.  No immobilization or surgery.  She has a history of tobacco use.  States that she stopped yesterday.    REVIEW OF SYSTEMS  As per HPI, otherwise a 10 point review of systems is negative    PAST MEDICAL HISTORY  Past Medical History:   Diagnosis Date   • Arthritis     \"everywhere\"   • ASTHMA    • Backpain     chronic back pain   • Breath shortness     \"only with flare up with allergies\"   • Bronchitis     as a child   • Congestive heart failure (HCC) 2013    related to pulmonary failure   • COPD    • Diabetes     Type 2   • Disorder of thyroid     Hypothyroid   • Fall    • Fibromyalgia    • GERD (gastroesophageal reflux disease)    • Heart burn    • Hepatitis C     \"I have markers in blood but not active\"   • Hypertension    • Indigestion    • Infectious disease     Hepatitis C   • Neuropathy (HCC)     bilat feet   • On home oxygen therapy    • Other specified symptom associated with female genital organs     Hysterectomy at age 22yrs   • Pain 9/13/2016    \"pain everywhere\"   • PND (post-nasal drip)    • Pneumonia     as a child   • Psychiatric problem 9/13/2016    PTSD   • RLS (restless legs syndrome)    • Sleep apnea     does not wear CPAP, \"can't do it\"   • Unspecified urinary incontinence        SOCIAL HISTORY  Social History   Substance Use Topics   • Smoking status: Current Every Day " Smoker     Packs/day: 1.00     Years: 50.00     Types: Cigarettes     Last attempt to quit: 3/12/2018   • Smokeless tobacco: Never Used   • Alcohol use No       SURGICAL HISTORY  Past Surgical History:   Procedure Laterality Date   • ANKLE ORIF Left 5/8/2017    Procedure: ANKLE ORIF;  Surgeon: Rico Gtz M.D.;  Location: [facility];  Service:    • AZ DSTR NROLYTC AGNT PARVERTEB FCT SNGL LMBR/SACRAL Left 9/16/2016    Procedure: NEURO DEST FACET L/S W/IG SNGL - L3-S1;  Surgeon: Xavier Arteaga D.O.;  Location: SURGERY [facility];  Service: Pain Management   • AZ DSTR NROLYTC AGNT PARVERTEB FCT ADDL LMBR/SACRAL  9/16/2016    Procedure: NEURO DEST FACET L/S W/IG ADDL;  Surgeon: Xavier Arteaga D.O.;  Location: SURGERY [facility];  Service: Pain Management   • AZ DSTR NROLYTC AGNT PARVERTEB FCT ADDL LMBR/SACRAL  9/16/2016    Procedure: NEURO DEST FACET L/S W/IG ADDL;  Surgeon: Xavier Arteaga D.O.;  Location: SURGERY [facility];  Service: Pain Management   • PB FLUOROSCOPIC GUIDANCE NEEDLE PLACEMENT  9/16/2016    Procedure: FLOURO GUIDE NEEDLE PLACEMENT;  Surgeon: Xavier Arteaga D.O.;  Location: SURGERY [facility];  Service: Pain Management   • AZ DSTR NROLYTC AGNT PARVERTEB FCT SNGL LMBR/SACRAL Right 11/6/2015    Procedure: NEURO DEST FACET L/S W/IG SNGL - L3-S1;  Surgeon: Xavier Arteaga D.O.;  Location: SURGERY [facility];  Service: Pain Management   • AZ DSTR NROLYTC AGNT PARVERTEB FCT ADDL LMBR/SACRAL  11/6/2015    Procedure: NEURO DEST FACET L/S W/IG ADDL;  Surgeon: Xavier Arteaga D.O.;  Location: SURGERY [facility];  Service: Pain Management   • PB INJECT RX OTHER PERIPH NERVE  11/6/2015    Procedure: NEUROLYTIC DEST-OTHER NERVE;  Surgeon: Xavier Arteaga D.O.;  Location: [facility];  Service: Pain Management   • AZ DSTR NROLYTC AGNT PARVERTEB FCT ADDL LMBR/SACRAL  11/6/2015    Procedure: NEURO DEST FACET L/S W/IG  ADDL;  Surgeon: Xavier Arteaga D.O.;  Location: SURGERY [facility];  Service: Pain Management   • LA DSTR NROLYTC AGNT PARVERTEB FCT SNGL LMBR/SACRAL Left 10/2/2015    Procedure: NEURO DEST FACET L/S W/IG SNGL - L3-S1;  Surgeon: Xavier Arteaga D.O.;  Location: SURGERY [facility];  Service: Pain Management   • LA DSTR NROLYTC AGNT PARVERTEB FCT ADDL LMBR/SACRAL  10/2/2015    Procedure: NEURO DEST FACET L/S W/IG ADDL;  Surgeon: Xavier Arteaga D.O.;  Location: SURGERY [facility];  Service: Pain Management   • PB INJECT RX OTHER PERIPH NERVE  10/2/2015    Procedure: NEUROLYTIC DEST-OTHER NERVE;  Surgeon: Xavier Arteaga D.O.;  Location: SURGERY [facility];  Service: Pain Management   • LA DSTR NROLYTC AGNT PARVERTEB FCT ADDL LMBR/SACRAL  10/2/2015    Procedure: NEURO DEST FACET L/S W/IG ADDL;  Surgeon: Xavier Arteaga D.O.;  Location: SURGERY [facility];  Service: Pain Management   • PB INJ DX/THER AGNT PARAVERT FACET JOINT, OBED* Left 7/17/2015    Procedure: INJ PARA FACET L/S 1 LVL W/IG - L3-S1;  Surgeon: Xavier Arteaga D.O.;  Location: SURGERY [facility];  Service: Pain Management   • PB INJ DX/THER AGNT PARAVERT FACET JOINT, OBED*  7/17/2015    Procedure: INJ PARA FACET L/S 2D LVL W/IG;  Surgeon: Xavier Arteaga D.O.;  Location: SURGERY [facility];  Service: Pain Management   • PB INJ DX/THER AGNT PARAVERT FACET JOINT, OBED*  7/17/2015    Procedure: INJ PARA FACET L/S 3D LVL W/IG;  Surgeon: Xavier Arteaga D.O.;  Location: SURGERY [facility];  Service: Pain Management   • PB INJECT NERV BLCK,OTHR PERIPH NERV  7/17/2015    Procedure: INJ-ANES AGENT-OTHER PHER.NRVE;  Surgeon: Xavier G Arteaga, D.O.;  Location: [facility];  Service: Pain Management   • PB INJ DX/THER AGNT PARAVERT FACET JOINT, OBED* Left 7/10/2015    Procedure: INJ PARA FACET L/S 1 LVL W/IG - L3-S1;  Surgeon: Xavier Arteaga D.O.;  Location: [facility];  Service: Pain Management   • PB INJ DX/THER AGNT PARAVERT FACET JOINT, OBED*  7/10/2015    Procedure: INJ PARA FACET L/S 2D LVL W/IG;  Surgeon: Xavier Arteaga D.O.;  Location: SURGERY [facility];  Service: Pain Management   • PB INJ DX/THER AGNT PARAVERT FACET JOINT, OBED*  7/10/2015    Procedure: INJ PARA FACET L/S 3D LVL W/IG;  Surgeon: Xavier Arteaga D.O.;  Location: SURGERY SUR[facility];  Service: Pain Management   • PB INJECT NERV BLCK,OTHR PERIPH NERV  7/10/2015    Procedure: INJ-ANES AGENT-OTHER PHER.NRVE;  Surgeon: Xavier Arteaga D.O.;  Location: SURGERY SUR[facility];  Service: Pain Management   • GYN SURGERY      hysterectomy    • LUMPECTOMY Left     Left breast   • OTHER ORTHOPEDIC SURGERY      L knee replacement    • OTHER ORTHOPEDIC SURGERY      R carpal tunnel    • US-NEEDLE CORE BX-BREAST PANEL         CURRENT MEDICATIONS  Home Medications     Reviewed by Rhea Wilson (Pharmacy Tech) on 04/14/19 at 0811  Med List Status: Complete   Medication Last Dose Status   acetaminophen (TYLENOL) 325 MG Tab 4/14/2019 Active   amitriptyline (ELAVIL) 50 MG Tab 4/13/2019 Active   amLODIPine (NORVASC) 10 MG Tab 4/13/2019 Active   cyclobenzaprine (FLEXERIL) 5 MG tablet 4/13/2019 Active   Diclofenac Sodium 1 % Gel unknown Active   DULoxetine (CYMBALTA) 60 MG Cap DR Particles delayed-release capsule 4/13/2019 Active   insulin glargine (LANTUS SOLOSTAR) 100 UNIT/ML Solution Pen-injector injection 4/12/2019 Active   ipratropium (ATROVENT) 17 MCG/ACT Aero Soln 4/13/2019 Active   levothyroxine (SYNTHROID) 75 MCG Tab 4/13/2019 Active   Nutritional Supplements (ANTIOXIDANTS PO) 4/13/2019 Active   nystatin/triamcinolone (MYCOLOG) 068256-5.1 UNIT/GM-% Cream 4/13/2019 Active   oxyCODONE immediate release (ROXICODONE) 10 MG immediate release tablet 4/13/2019 Active   rosuvastatin (CRESTOR) 10 MG Tab 4/13/2019 Active   SYMBICORT 160-4.5 MCG/ACT Aerosol 4/14/2019 Active   traZODone  "(DESYREL) 50 MG Tab 4/13/2019 Active                ALLERGIES  Allergies   Allergen Reactions   • Vitamin C Diarrhea     \"violent diarrhea\"   • Keflex Rash     Severe rash, but TOLERATES PENICILLINS, Rocephin    • Codeine Nausea     Severe stomach pain   • Gabapentin Palpitations     Gets shaky and falls    • Lyrica Nausea     Nausea, dizzy   • Sudafed Nausea and Unspecified     Chest pain, nausea       PHYSICAL EXAM  VITAL SIGNS: /40   Pulse 71   Temp 37.6 °C (99.7 °F) (Temporal)   Resp 19   Ht 1.651 m (5' 5\")   Wt 109 kg (240 lb 4.8 oz)   SpO2 95%   BMI 39.99 kg/m²    Constitutional: Awake and alert  HENT:  Atraumatic, Normocephalic.Oropharynx dry mucus membranes, Nose normal inspection.   Eyes: Normal inspection  Neck: Supple, no JVD  Cardiovascular: Normal heart rate, Normal rhythm.  Symmetric peripheral pulses.   Thorax & Lungs: Tachypnea, increased work of breathing.  Pursed lip breathing.  Diffuse wheezing.  No crackles.  Abdomen: Bowel sounds normal, soft, non-distended, nontender, no mass  Skin: Warm, Dry, No rash.   Back: No tenderness, No CVA tenderness.   Extremities: No remarkable edema.  Neurologic: Grossly normal   Psychiatric: Anxious appearing    RADIOLOGY/PROCEDURES  DX-CHEST-PORTABLE (1 VIEW)   Final Result      Stable enlargement of the cardiomediastinal silhouette without acute cardiopulmonary disease evident.           Imaging is interpreted by radiologist    Labs:  Results for orders placed or performed during the hospital encounter of 04/14/19   Lactic acid (lactate)   Result Value Ref Range    Lactic Acid 1.3 0.5 - 2.0 mmol/L   CBC WITH DIFFERENTIAL   Result Value Ref Range    WBC 7.0 4.8 - 10.8 K/uL    RBC 3.11 (L) 4.20 - 5.40 M/uL    Hemoglobin 9.1 (L) 12.0 - 16.0 g/dL    Hematocrit 30.2 (L) 37.0 - 47.0 %    MCV 97.1 81.4 - 97.8 fL    MCH 29.3 27.0 - 33.0 pg    MCHC 30.1 (L) 33.6 - 35.0 g/dL    RDW 50.4 (H) 35.9 - 50.0 fL    Platelet Count 163 (L) 164 - 446 K/uL    MPV 9.1 9.0 " - 12.9 fL    Neutrophils-Polys 80.80 (H) 44.00 - 72.00 %    Lymphocytes 9.80 (L) 22.00 - 41.00 %    Monocytes 7.50 0.00 - 13.40 %    Eosinophils 0.60 0.00 - 6.90 %    Basophils 0.40 0.00 - 1.80 %    Immature Granulocytes 0.90 0.00 - 0.90 %    Nucleated RBC 0.00 /100 WBC    Neutrophils (Absolute) 5.62 2.00 - 7.15 K/uL    Lymphs (Absolute) 0.68 (L) 1.00 - 4.80 K/uL    Monos (Absolute) 0.52 0.00 - 0.85 K/uL    Eos (Absolute) 0.04 0.00 - 0.51 K/uL    Baso (Absolute) 0.03 0.00 - 0.12 K/uL    Immature Granulocytes (abs) 0.06 0.00 - 0.11 K/uL    NRBC (Absolute) 0.00 K/uL   COMP METABOLIC PANEL   Result Value Ref Range    Sodium 134 (L) 135 - 145 mmol/L    Potassium 4.6 3.6 - 5.5 mmol/L    Chloride 103 96 - 112 mmol/L    Co2 24 20 - 33 mmol/L    Anion Gap 7.0 0.0 - 11.9    Glucose 185 (H) 65 - 99 mg/dL    Bun 24 (H) 8 - 22 mg/dL    Creatinine 1.53 (H) 0.50 - 1.40 mg/dL    Calcium 8.6 8.5 - 10.5 mg/dL    AST(SGOT) 23 12 - 45 U/L    ALT(SGPT) 19 2 - 50 U/L    Alkaline Phosphatase 46 30 - 99 U/L    Total Bilirubin 0.3 0.1 - 1.5 mg/dL    Albumin 3.7 3.2 - 4.9 g/dL    Total Protein 6.5 6.0 - 8.2 g/dL    Globulin 2.8 1.9 - 3.5 g/dL    A-G Ratio 1.3 g/dL   BNP   Result Value Ref Range    B Natriuretic Peptide 77 0 - 100 pg/mL   ESTIMATED GFR   Result Value Ref Range    GFR If  41 (A) >60 mL/min/1.73 m 2    GFR If Non  34 (A) >60 mL/min/1.73 m 2   Influenza A/B By PCR (Adult - Flu Only)   Result Value Ref Range    Influenza virus A RNA Negative Negative    Influenza virus B, PCR Negative Negative   EKG   Result Value Ref Range    Report       [facility]    Test Date:  2019  Pt Name:    CHAITANYA CABELLO            Department: ER  MRN:        [id number]                      Room:        09  Gender:     Female                       Technician: [id number]  :        [id number]                   Requested By:ER TRIAGE PROTOCOL  Order #:    [id number]                     Reading MD: RILEY REYNOSO MD    Measurements  Intervals                                Axis  Rate:       78                           P:          59  RI:         149                          QRS:        27  QRSD:       84                           T:          82  QT:         384  QTc:        438    Interpretive Statements  SINUS RHYTHM  Compared to ECG 01/22/2019 22:01:27  Sinus bradycardia no longer present  T-wave abnormality no longer present    Electronically Signed On 4- 9:03:14 PDT by RILEY REYNOSO MD         Medications   azithromycin (ZITHROMAX) injection 500 mg (500 mg Intravenous IVPB 4/14/19 0811)   cefTRIAXone (ROCEPHIN) 2 g in  mL IVPB (0 g Intravenous Stopped 4/14/19 0826)   methylPREDNISolone sod succ (SOLU-MEDROL) 125 MG injection 125 mg (125 mg Intravenous Given 4/14/19 0727)   ipratropium-albuterol (DUONEB) nebulizer solution (3 mL Nebulization Given 4/14/19 0732)       COURSE & MEDICAL DECISION MAKING  Patient presents with dyspnea and hypoxia.  Has significant bronchospasm on examination.  History of COPD.  She was given Solu-Medrol and additional DuoNeb treatment.  She reported having fever at home and empiric antibiotics were given.  Laboratory data returned as above.  Chest x-ray without pneumonia.    Because of significant breathlessness with COPD exacerbation patient requires admission to the hospital for further treatment.  I consulted Dr. Cruz for admission.      FINAL IMPRESSION  1.  COPD with acute exacerbation      This dictation was created using voice recognition software. The accuracy of the dictation is limited to the abilities of the software.  The nursing notes were reviewed and certain aspects of this information were incorporated into this note.      Electronically signed by: Riley Reynoso, 4/14/2019 9:02 AM

## 2019-04-14 NOTE — CARE PLAN
Problem: Oxygenation:  Goal: Maintain adequate oxygenation dependent on patient condition  Outcome: PROGRESSING AS EXPECTED    Intervention: Manage oxygen therapy by monitoring pulse oximetry and/or ABG values  Pt on 5L NC wears 3.5 at home; will titrate as needed      Problem: Bronchoconstriction:  Goal: Improve in air movement and diminished wheezing  Outcome: PROGRESSING AS EXPECTED    Intervention: Implement inhaled treatments  Duo Q4 for COPD ex; bs expiratory wheezes throughout

## 2019-04-14 NOTE — ASSESSMENT & PLAN NOTE
-Appears stable.  - avoid nephrotoxins, and continue to renally dose all medications.  Start lisinopril

## 2019-04-14 NOTE — ASSESSMENT & PLAN NOTE
- Priya Callahan presents with COPD exacerbation.  The patient continues to smoke 4 cigarettes per day.  She has tried to quit before. I spent 5 minutes counseling the patient on cessation techniques and resources were offered including nicotine patches, gum, along with medical treatment with wellbutrin and chantix. The patient understands continuing to smoke could lead to complications such as lung disease, heart attack, stroke and death.  The benefits of stopping were also presented to her. The patient verbalized that she is ready to quit. She has set goals of 7 days to be completely smoke free.. The patient will follow up again in 2 weeks with PCP to check progress. CPT CODE: 03830 (3-10 minutes tobacco counseling).  - Will start on nicotine replacement therapy while in-house.

## 2019-04-14 NOTE — H&P
Hospital Medicine History & Physical Note    Date of Service  4/14/2019    Primary Care Physician  Kavitha Mccall M.D.    Consultants  None    Code Status  Full code    Chief Complaint  Dyspnea    History of Presenting Illness  68 y.o. female who presented 4/14/2019 with past history of diabetes mellitus type, obesity,  COPD on home oxygen at 3-1/2 L chronically comes to the emergent complaining of shortness of breath ongoing for the last 7 days.  She states that she increase her oxygen to 4 L because she was short of breath.  She said that she had fever at home with a temperature of 101.  She is coughing up sputum white mucus.  Over the last 2 days her shortness breath got worse so she called 911 and was brought to the emergency room via paramedic    On route and in the emergency department she was given nebulizer treatment that did improve her breathing patient referred to me for the care and management.  Patient was smoking up until today was still an active smoker she states she is quit smoking today    She feels like her legs are a little bit swollen.    No chest pain    No palpitations or diaphoresis    Review of Systems  Review of Systems   Constitutional: Positive for fever and malaise/fatigue. Negative for chills.   HENT: Negative for ear pain, hearing loss and tinnitus.    Eyes: Negative.    Respiratory: Positive for shortness of breath.    Cardiovascular: Positive for leg swelling.   Gastrointestinal: Negative.    Genitourinary: Negative.    Musculoskeletal: Negative.    Skin: Negative for itching and rash.   Neurological: Negative.    Psychiatric/Behavioral: Negative.        Past Medical History   has a past medical history of Arthritis; ASTHMA; Backpain; Breath shortness; Bronchitis; Congestive heart failure (HCC) (2013); COPD; Diabetes; Disorder of thyroid; Fall; Fibromyalgia; GERD (gastroesophageal reflux disease); Heart burn; Hepatitis C; Hypertension; Indigestion; Infectious disease; Neuropathy  (HCC); On home oxygen therapy; Other specified symptom associated with female genital organs; Pain (9/13/2016); PND (post-nasal drip); Pneumonia; Psychiatric problem (9/13/2016); RLS (restless legs syndrome); Sleep apnea; and Unspecified urinary incontinence.    Surgical History   has a past surgical history that includes gyn surgery; other orthopedic surgery; other orthopedic surgery; us-needle core bx-breast panel; lumpectomy (Left); pr inj dx/ther agnt paravert facet joint, bruce* (Left, 7/10/2015); pr inj dx/ther agnt paravert facet joint, bruce* (7/10/2015); pr inj dx/ther agnt paravert facet joint, bruce* (7/10/2015); pr inject nerv blck,othr periph nerv (7/10/2015); pr inj dx/ther agnt paravert facet joint, bruce* (Left, 7/17/2015); pr inj dx/ther agnt paravert facet joint, bruce* (7/17/2015); pr inj dx/ther agnt paravert facet joint, bruce* (7/17/2015); pr inject nerv blck,othr periph nerv (7/17/2015); pr dstr nrolytc agnt parverteb fct sngl lmbr/sacral (Left, 10/2/2015); pr dstr nrolytc agnt parverteb fct addl lmbr/sacral (10/2/2015); pr inject rx other periph nerve (10/2/2015); pr dstr nrolytc agnt parverteb fct addl lmbr/sacral (10/2/2015); pr dstr nrolytc agnt parverteb fct sngl lmbr/sacral (Right, 11/6/2015); pr dstr nrolytc agnt parverteb fct addl lmbr/sacral (11/6/2015); pr inject rx other periph nerve (11/6/2015); pr dstr nrolytc agnt parverteb fct addl lmbr/sacral (11/6/2015); pr dstr nrolytc agnt parverteb fct sngl lmbr/sacral (Left, 9/16/2016); pr dstr nrolytc agnt parverteb fct addl lmbr/sacral (9/16/2016); pr dstr nrolytc agnt parverteb fct addl lmbr/sacral (9/16/2016); pr fluoroscopic guidance needle placement (9/16/2016); and ankle orif (Left, 5/8/2017).     Family History  family history includes Alcohol/Drug in her mother; Cancer in her brother and father; Diabetes in her brother and maternal grandmother; Heart Disease in her mother and paternal grandmother; Lung Disease in her mother; Stroke in her paternal  "grandmother.     Social History   reports that she has been smoking Cigarettes.  She has a 50.00 pack-year smoking history. She has never used smokeless tobacco. She reports that she does not drink alcohol or use drugs.    Allergies  Allergies   Allergen Reactions   • Vitamin C Diarrhea     \"violent diarrhea\"   • Keflex Rash     Severe rash, but TOLERATES PENICILLINS, Rocephin    • Codeine Nausea     Severe stomach pain   • Gabapentin Palpitations     Gets shaky and falls    • Lyrica Nausea     Nausea, dizzy   • Sudafed Nausea and Unspecified     Chest pain, nausea       Medications  Prior to Admission Medications   Prescriptions Last Dose Informant Patient Reported? Taking?   DULoxetine (CYMBALTA) 60 MG Cap DR Particles delayed-release capsule 4/13/2019 at Unknown time Patient No No   Sig: TAKE 1 CAPSULE BY MOUTH DAILY   Diclofenac Sodium 1 % Gel unknown at Unknown time Patient Yes No   Sig: Apply 2 g to skin as directed 2 times a day as needed (for pain).   Nutritional Supplements (ANTIOXIDANTS PO) 4/13/2019 at Unknown time Patient Yes No   Sig: Take 1 Tab by mouth every day.   SYMBICORT 160-4.5 MCG/ACT Aerosol 4/14/2019 at Unknown time  No No   Sig: INHALE 2 PUFFS BY MOUTH TWO TIMES DAILY   acetaminophen (TYLENOL) 325 MG Tab 4/14/2019 at Unknown time  No No   Sig: Take 2 Tabs by mouth every 6 hours as needed (Mild Pain; (Pain scale 1-3); Temp greater than 100.5 F).   amLODIPine (NORVASC) 10 MG Tab 4/13/2019 at Unknown time  No No   Sig: Take 1 Tab by mouth every day.   amitriptyline (ELAVIL) 50 MG Tab 4/13/2019 at Unknown time  Yes No   Sig: Take 50 mg by mouth every bedtime.   cyclobenzaprine (FLEXERIL) 5 MG tablet 4/13/2019 at Unknown time  No No   Sig: TAKE ONE TO TWO TABLETS BY MOUTH THREE TIMES DAILY AS NEEDED   insulin glargine (LANTUS SOLOSTAR) 100 UNIT/ML Solution Pen-injector injection 4/12/2019 at Unknown time  Yes Yes   Sig: Inject 20 Units as instructed every evening.   ipratropium (ATROVENT) 17 " MCG/ACT Aero Soln 4/13/2019 at Unknown time Patient No No   Sig: Inhale 2 Puffs by mouth every 6 hours.   levothyroxine (SYNTHROID) 75 MCG Tab 4/13/2019 at Unknown time  No No   Sig: TAKE 1 TABLET BY MOUTH DAILY   nystatin/triamcinolone (MYCOLOG) 715743-6.1 UNIT/GM-% Cream 4/13/2019 at Unknown time  No No   Sig: APPLY A THIN LAYER TO FUNGAL RASH ONCE DAILY AS NEEDED   oxyCODONE immediate release (ROXICODONE) 10 MG immediate release tablet 4/13/2019 at Unknown time  No No   Sig: Take 1 Tab by mouth every four hours as needed for Moderate Pain for up to 30 days.   rosuvastatin (CRESTOR) 10 MG Tab 4/13/2019 at Unknown time Patient No No   Sig: Take 1 Tab by mouth every evening.   traZODone (DESYREL) 50 MG Tab 4/13/2019 at Unknown time Patient Yes No   Sig: Take 150 mg by mouth every bedtime.      Facility-Administered Medications: None       Physical Exam  Temp:  [37.1 °C (98.8 °F)-37.6 °C (99.7 °F)] 37.1 °C (98.8 °F)  Pulse:  [71-86] 86  Resp:  [19-24] 20  BP: (126-143)/(40-70) 143/70  SpO2:  [95 %-100 %] 97 %    Physical Exam   Constitutional: She is oriented to person, place, and time. She appears distressed.   HENT:   Head: Normocephalic and atraumatic.   Mouth/Throat: No oropharyngeal exudate.   Eyes: Pupils are equal, round, and reactive to light. EOM are normal. Right eye exhibits no discharge. Left eye exhibits no discharge. No scleral icterus.   Neck: Normal range of motion. Neck supple. No JVD present. No tracheal deviation present. No thyromegaly present.   Cardiovascular: Normal rate and intact distal pulses.    No murmur heard.  Pulmonary/Chest: She is in respiratory distress.   Coarse bilateral rhonchi with expiratory wheeze   Abdominal: Soft. Bowel sounds are normal. She exhibits no distension. There is no guarding.   Musculoskeletal: She exhibits edema (Trace edema both lower extremities, chronic venous stasis changes both leg).   Neurological: She is alert and oriented to person, place, and time. No  cranial nerve deficit. Coordination normal.   Skin: Skin is warm and dry. She is not diaphoretic.   Psychiatric: She has a normal mood and affect.       Laboratory:  Recent Labs      04/14/19   0653   WBC  7.0   RBC  3.11*   HEMOGLOBIN  9.1*   HEMATOCRIT  30.2*   MCV  97.1   MCH  29.3   MCHC  30.1*   RDW  50.4*   PLATELETCT  163*   MPV  9.1     Recent Labs      04/14/19   0653   SODIUM  134*   POTASSIUM  4.6   CHLORIDE  103   CO2  24   GLUCOSE  185*   BUN  24*   CREATININE  1.53*   CALCIUM  8.6     Recent Labs      04/14/19   0653   ALTSGPT  19   ASTSGOT  23   ALKPHOSPHAT  46   TBILIRUBIN  0.3   GLUCOSE  185*         Recent Labs      04/14/19   0653   BNPBTYPENAT  77         No results for input(s): TROPONINI in the last 72 hours.    Urinalysis:    No results found     Imaging:  DX-CHEST-PORTABLE (1 VIEW)   Final Result      Stable enlargement of the cardiomediastinal silhouette without acute cardiopulmonary disease evident.            Assessment/Plan:  I anticipate this patient will require at least two midnights for appropriate medical management, necessitating inpatient admission.    * Acute on chronic respiratory failure with hypoxia (HCC)- (present on admission)   Assessment & Plan    Treat COPD as below     Acute exacerbation of chronic obstructive pulmonary disease (COPD) (HCC)- (present on admission)   Assessment & Plan    IV steroid    Bronchodilators via nebulize    P.o. antibiotic doxycycline    Pulmonary toilet with p.o. Lasix     Type 2 diabetes mellitus with hyperglycemia, with long-term current use of insulin ([facility])- (present on admission)   Assessment & Plan    Fingerstick with sliding scale insulin    Diabetic diet     Chronic kidney disease, stage 3 (HCC)- (present on admission)   Assessment & Plan    Monitor BMP    Renally dose all medication     Chronic use of opiate drugs therapeutic purposes- (present on admission)   Assessment & Plan    Continue oxycodone as needed     Obesity (BMI 30-39.9)-  (present on admission)   Assessment & Plan    Weight loss and exercise recommend     Neuropathy (HCC)- (present on admission)   Assessment & Plan    Related to diabetes mellitus    Controlled diabetes    Continue Cymbalta     Tobacco abuse disorder- (present on admission)   Assessment & Plan    Smoking cessation advised     Hypothyroidism- (present on admission)   Assessment & Plan    Continue Synthroid     Major depressive disorder (CMS-HCC)- (present on admission)   Assessment & Plan    Continue Cymbalta trazodone and Elavil         VTE prophylaxis: scd

## 2019-04-14 NOTE — ASSESSMENT & PLAN NOTE
-Most recent hemoglobin A1c in January was 6.0.  -Continue home Lantus, and insulin sliding scale coverage while in-house.  Accu-Chek's before meals and at bedtime.  -Goal to keep BG between 140-180 per 2019 ADA guidelines.  - Uncontrolled due to steroids increased his lantus dose to 25U

## 2019-04-14 NOTE — DISCHARGE PLANNING
"Care Transition Team Assessment              Elopement Risk  Legal Hold: No    Domestic Abuse  Have you ever been the victim of abuse or violence?: No       Discharge Planning  Date of Service: 2/8/2019 2:28 PM  Cristela Cruz      []Hide copied text  []Caio for attribution information  Care Transition Team Assessment     MSW received call from [facility] to assist pt with discharge planning. Pt has had multiple falls. Pt fell yesterday-tripped over shoes. Pt was discharged from [facility] on 1/30/19 for a fall. Pt went home with [facility] and declined SNF.Given resources from MOHAMUD Faulkner for caregivers and group homes.      MSW met with pt. Per pt, she stated she was doing well until she tripped. Pt lives alone in [place], [place] in a 1 story home. Pt has a friend Claribel Cordero ([phone]) that helps pt at home. Pt makes around $644 a month with SSI. Then gets around $300-500 extra with \"[employer].\" Pt stated she is trying to sell her house (worth around $200,000) to go live at [facility]. Pt doesn't qualify for medicaid as she has other properties in [place].      Pt uses a walker and wheelchair at home. Pt also has a bedside commode and hospital bed (pt reports needs new mattress). Pt wears continuous O2 3 Liters. Preferred is her [employer] company. Pt needs assistance with most ADLS/IADLS. Pt was only bathing when HH would come in. Pt can toilet herself when she is healthy.  Pt's friend does her grocery shopping for her. Pt doesn't do laundry or clean. Pt's friend also assists her with that as well. Pt's PCP is Kavitha Mccall. Pt uses [facility] in [place], [place].      Pt has advanced directive on file. Pt's brother Uzair Zavala ([phone]) is DPOA.      Pt open to going to SNF now to get stronger before she returns home. Pt signed choice for 1) [facility] 2) [facility] 3) [facility]. MSW faxed choice form to [facility]. Will update Unit SW on pt.      Information Source  Orientation : Oriented x " 4  Information Given By: Patient     Readmission Evaluation  Is this a readmission?: Yes - unplanned readmission  Why do you think you were readmitted?:  (fall at home)  Did you understand your discharge instructions?: Yes  Did you have enough support after your last discharge?: Yes     Elopement Risk  Legal Hold: No     Interdisciplinary Discharge Planning  Does Admitting Nurse Feel This Could be a Complex Discharge?: No  Primary Care Physician:  (Corinne Mccall)  Lives with - Patient's Self Care Capacity: Alone and Unable to Care For Self  Patient or legal guardian wants to designate a caregiver (see row info): No  Support Systems: Friends / Neighbors  Housing / Facility: [address]  Do You Take your Prescribed Medications Regularly: Yes  Able to Return to Previous ADL's: Future Time w/Therapy  Mobility Issues: Yes  Prior Services: Home With Outpatient Therapy, Skilled Home Health Services  Patient Expects to be Discharged to::  (SNF)  Assistance Needed: Yes  Durable Medical Equipment: Home Oxygen, Walker, Commode  DME Provider / Phone:  (Preferred)     Discharge Preparedness  What is your plan after discharge?: Skilled nursing facility  What are your discharge supports?: Other (comment) (friends)  Prior Functional Level: Needs Assist with Activities of Daily Living  Difficulity with ADLs: Bathing, Dressing, Toileting, Walking  Difficulity with IADLs: Cooking, Driving, Laundry, Shopping  Difficulity with IADL Comments: Pt's friend shops for her     Functional Assesment  Prior Functional Level: Needs Assist with Activities of Daily Living     Finances  Financial Barriers to Discharge: No  Prescription Coverage: Yes     Vision / Hearing Impairment  Vision Impairment : Yes (glasses)  Hearing Impairment : No     Values / Beliefs / Concerns  Values / Beliefs Concerns : No     Advance Directive  Advance Directive?: DPOA for Health Care  Durable Power of  Name and Contact : Salty Zavala (brother)     Domestic  Abuse  Have you ever been the victim of abuse or violence?: No     Psychological Assessment  History of Substance Abuse: None  History of Psychiatric Problems: No     Discharge Risks or Barriers  Discharge risks or barriers?: Post-acute placement / services, Lives alone, no community support  Patient risk factors: Complex medical needs, Lack of outside supports, Lives alone and no community support, Readmission     Anticipated Discharge Information  Anticipated discharge disposition: SNF  Discharge Address: [address] [place] [address] [place] [address]  Discharge Contact Phone Number: [phone]                         ED to Hosp-Admission (Discharged) on 2/8/2019            Detailed Report

## 2019-04-14 NOTE — ASSESSMENT & PLAN NOTE
Patient takes 3 L and is currently on 4 L of oxygen here in the hospital.  Likely secondary to COPD exacerbation  Continue to wean off as necessary  RT protocol  History of hypercapnia will avoid all sedative medications

## 2019-04-14 NOTE — PROGRESS NOTES
· 2 RN skin check complete with SHITAL Villarreal.  · Devices in place O2 NC tubing.  · Skin assessed under devices bilateral ears intact.  · Confirmed pressure ulcers found on None.  · New potential pressure ulcers noted on None.   · The following interventions in place N/A    Skin overall intact. Scabbing and bruising to upper and lower extremities. Sacrum intact. Redness under pannus and breasts, pt self applying barrier cream to areas.

## 2019-04-14 NOTE — ED TRIAGE NOTES
Pt BIB EMS from home for worsening SOB. Pt states she has been SOB for one week, unable to sleep last night because she was unable to breathe. Per report Pt had fever of 101F at home, Pt given 1 gram Tylenol, 1 Albuterol and 1 Duo neb treatment en route to ED. Pt state this is the worst she has felt.

## 2019-04-14 NOTE — ASSESSMENT & PLAN NOTE
-Persistently wheezing  -Maintain on IV Solu-Medrol 40 mg IV every 12 hours.  Continue to monitor for clinical improvement, and wean steroids.  Continue bronchodilator.  Resume home dose Symbicort.  -Continue PRN bronchodilators per RT. continue oxygen supplements, wean as able back to her baseline chronic home oxygen.  -Continue oral doxycycline. pro calcitonin +- No infiltrate, fevers may be possible due to renal failure we will hold off on additional antibiotics for now

## 2019-04-14 NOTE — PROGRESS NOTES
Pt received from Red [facility], bedside report received from SHITAL Archibald. ER RN stated that pt's medical alert necklace was in the bed with the patient. On arrival to the floor, herberthrnanette searched and pt's belongings searched and medical alert necklace not found. Fernanda RN, Cherelle RN, and HERRERA Lassiter RN all present during search for necklace. Fernanda PINA RN returned to ER Red 9 and searched room including trash cans and did not find pt's medical alert necklace.  Tele monitor in place. VSS. Pt oriented to room. Educated on use of the call light. Pt demonstrated use of the call light. Discussed POC. All questions answered. Bed alarm in use due to fall hx.

## 2019-04-15 LAB
GLUCOSE BLD-MCNC: 198 MG/DL (ref 65–99)
GLUCOSE BLD-MCNC: 255 MG/DL (ref 65–99)
GLUCOSE BLD-MCNC: 311 MG/DL (ref 65–99)
GLUCOSE BLD-MCNC: 360 MG/DL (ref 65–99)
PROCALCITONIN SERPL-MCNC: 0.33 NG/ML

## 2019-04-15 PROCEDURE — 770020 HCHG ROOM/CARE - TELE (206)

## 2019-04-15 PROCEDURE — 700111 HCHG RX REV CODE 636 W/ 250 OVERRIDE (IP): Performed by: HOSPITALIST

## 2019-04-15 PROCEDURE — 82962 GLUCOSE BLOOD TEST: CPT | Mod: 91

## 2019-04-15 PROCEDURE — A9270 NON-COVERED ITEM OR SERVICE: HCPCS | Performed by: HOSPITALIST

## 2019-04-15 PROCEDURE — 700102 HCHG RX REV CODE 250 W/ 637 OVERRIDE(OP): Performed by: HOSPITALIST

## 2019-04-15 PROCEDURE — 99233 SBSQ HOSP IP/OBS HIGH 50: CPT | Mod: 25 | Performed by: INTERNAL MEDICINE

## 2019-04-15 PROCEDURE — 36415 COLL VENOUS BLD VENIPUNCTURE: CPT

## 2019-04-15 PROCEDURE — 99406 BEHAV CHNG SMOKING 3-10 MIN: CPT | Performed by: INTERNAL MEDICINE

## 2019-04-15 PROCEDURE — 700101 HCHG RX REV CODE 250: Performed by: HOSPITALIST

## 2019-04-15 PROCEDURE — 94640 AIRWAY INHALATION TREATMENT: CPT

## 2019-04-15 PROCEDURE — A9270 NON-COVERED ITEM OR SERVICE: HCPCS | Performed by: INTERNAL MEDICINE

## 2019-04-15 PROCEDURE — 700102 HCHG RX REV CODE 250 W/ 637 OVERRIDE(OP): Performed by: INTERNAL MEDICINE

## 2019-04-15 PROCEDURE — 700111 HCHG RX REV CODE 636 W/ 250 OVERRIDE (IP): Performed by: INTERNAL MEDICINE

## 2019-04-15 PROCEDURE — 84145 PROCALCITONIN (PCT): CPT

## 2019-04-15 RX ORDER — NICOTINE 21 MG/24HR
14 PATCH, TRANSDERMAL 24 HOURS TRANSDERMAL
Status: DISCONTINUED | OUTPATIENT
Start: 2019-04-15 | End: 2019-04-19 | Stop reason: HOSPADM

## 2019-04-15 RX ORDER — CYCLOBENZAPRINE HCL 10 MG
5 TABLET ORAL 3 TIMES DAILY PRN
Status: DISCONTINUED | OUTPATIENT
Start: 2019-04-15 | End: 2019-04-19 | Stop reason: HOSPADM

## 2019-04-15 RX ORDER — HEPARIN SODIUM 5000 [USP'U]/ML
5000 INJECTION, SOLUTION INTRAVENOUS; SUBCUTANEOUS EVERY 8 HOURS
Status: DISCONTINUED | OUTPATIENT
Start: 2019-04-15 | End: 2019-04-19 | Stop reason: HOSPADM

## 2019-04-15 RX ORDER — BUDESONIDE AND FORMOTEROL FUMARATE DIHYDRATE 160; 4.5 UG/1; UG/1
2 AEROSOL RESPIRATORY (INHALATION) 2 TIMES DAILY
Status: DISCONTINUED | OUTPATIENT
Start: 2019-04-15 | End: 2019-04-19 | Stop reason: HOSPADM

## 2019-04-15 RX ORDER — IPRATROPIUM BROMIDE AND ALBUTEROL SULFATE 2.5; .5 MG/3ML; MG/3ML
3 SOLUTION RESPIRATORY (INHALATION)
Status: DISCONTINUED | OUTPATIENT
Start: 2019-04-15 | End: 2019-04-19 | Stop reason: HOSPADM

## 2019-04-15 RX ADMIN — AMITRIPTYLINE HYDROCHLORIDE 50 MG: 25 TABLET, FILM COATED ORAL at 22:17

## 2019-04-15 RX ADMIN — INSULIN HUMAN 7 UNITS: 100 INJECTION, SOLUTION PARENTERAL at 17:32

## 2019-04-15 RX ADMIN — CYCLOBENZAPRINE HYDROCHLORIDE 5 MG: 10 TABLET, FILM COATED ORAL at 02:21

## 2019-04-15 RX ADMIN — INSULIN HUMAN 3 UNITS: 100 INJECTION, SOLUTION PARENTERAL at 06:07

## 2019-04-15 RX ADMIN — AMLODIPINE BESYLATE 10 MG: 10 TABLET ORAL at 05:58

## 2019-04-15 RX ADMIN — OXYCODONE HYDROCHLORIDE 10 MG: 10 TABLET ORAL at 02:32

## 2019-04-15 RX ADMIN — METHYLPREDNISOLONE SODIUM SUCCINATE 40 MG: 40 INJECTION, POWDER, FOR SOLUTION INTRAMUSCULAR; INTRAVENOUS at 12:24

## 2019-04-15 RX ADMIN — OXYCODONE HYDROCHLORIDE 10 MG: 10 TABLET ORAL at 11:19

## 2019-04-15 RX ADMIN — HEPARIN SODIUM 5000 UNITS: 5000 INJECTION, SOLUTION INTRAVENOUS; SUBCUTANEOUS at 15:47

## 2019-04-15 RX ADMIN — INSULIN GLARGINE 20 UNITS: 100 INJECTION, SOLUTION SUBCUTANEOUS at 17:41

## 2019-04-15 RX ADMIN — IPRATROPIUM BROMIDE AND ALBUTEROL SULFATE 3 ML: .5; 3 SOLUTION RESPIRATORY (INHALATION) at 07:07

## 2019-04-15 RX ADMIN — INSULIN HUMAN 3 UNITS: 100 INJECTION, SOLUTION PARENTERAL at 22:23

## 2019-04-15 RX ADMIN — TRAZODONE HYDROCHLORIDE 150 MG: 50 TABLET ORAL at 22:16

## 2019-04-15 RX ADMIN — IPRATROPIUM BROMIDE AND ALBUTEROL SULFATE 3 ML: .5; 3 SOLUTION RESPIRATORY (INHALATION) at 10:47

## 2019-04-15 RX ADMIN — NICOTINE 14 MG: 14 PATCH, EXTENDED RELEASE TRANSDERMAL at 10:37

## 2019-04-15 RX ADMIN — IPRATROPIUM BROMIDE AND ALBUTEROL SULFATE 3 ML: .5; 3 SOLUTION RESPIRATORY (INHALATION) at 20:04

## 2019-04-15 RX ADMIN — IPRATROPIUM BROMIDE AND ALBUTEROL SULFATE 3 ML: .5; 3 SOLUTION RESPIRATORY (INHALATION) at 14:06

## 2019-04-15 RX ADMIN — DULOXETINE HYDROCHLORIDE 60 MG: 60 CAPSULE, DELAYED RELEASE ORAL at 05:58

## 2019-04-15 RX ADMIN — FUROSEMIDE 20 MG: 20 TABLET ORAL at 05:58

## 2019-04-15 RX ADMIN — POTASSIUM CHLORIDE 20 MEQ: 1500 TABLET, EXTENDED RELEASE ORAL at 05:58

## 2019-04-15 RX ADMIN — DOXYCYCLINE 100 MG: 100 TABLET, FILM COATED ORAL at 17:33

## 2019-04-15 RX ADMIN — DOXYCYCLINE 100 MG: 100 TABLET, FILM COATED ORAL at 05:58

## 2019-04-15 RX ADMIN — BUDESONIDE AND FORMOTEROL FUMARATE DIHYDRATE 2 PUFF: 160; 4.5 AEROSOL RESPIRATORY (INHALATION) at 17:48

## 2019-04-15 RX ADMIN — LEVOTHYROXINE SODIUM 75 MCG: 75 TABLET ORAL at 05:58

## 2019-04-15 RX ADMIN — ROSUVASTATIN CALCIUM 10 MG: 10 TABLET, FILM COATED ORAL at 17:33

## 2019-04-15 RX ADMIN — METHYLPREDNISOLONE SODIUM SUCCINATE 40 MG: 40 INJECTION, POWDER, FOR SOLUTION INTRAMUSCULAR; INTRAVENOUS at 05:59

## 2019-04-15 RX ADMIN — OXYCODONE HYDROCHLORIDE 10 MG: 10 TABLET ORAL at 19:57

## 2019-04-15 RX ADMIN — INSULIN HUMAN 10 UNITS: 100 INJECTION, SOLUTION PARENTERAL at 12:24

## 2019-04-15 RX ADMIN — METHYLPREDNISOLONE SODIUM SUCCINATE 40 MG: 40 INJECTION, POWDER, FOR SOLUTION INTRAMUSCULAR; INTRAVENOUS at 17:33

## 2019-04-15 NOTE — PROGRESS NOTES
Assumed care at 1900, bedside report received from day shift RN. Pt is SR on the telemetry monitor. Patient is AO x 4 and is resting in bed on 5L supplemental oxygen and even respirations. Initial assessment completed and orders reviewed. POC addressed with patient. Call light within reach and hourly rounding in place. No further questions at this time. Fall precautions in place

## 2019-04-15 NOTE — CARE PLAN
Problem: Bronchoconstriction:  Goal: Improve in air movement and diminished wheezing  Coninue Q4 Duoneb treatments for acute exacerbation of COPD as per protocol. Pt does have expiratory wheezes and improved aeration after nebs.

## 2019-04-15 NOTE — CARE PLAN
Problem: Communication  Goal: The ability to communicate needs accurately and effectively will improve  Outcome: PROGRESSING AS EXPECTED  Communication board updated. All pt questions answered. No further questions at this time. Pt encouraged to voice feelings and ask questions as they arise.    Problem: Safety  Goal: Will remain free from injury  Outcome: PROGRESSING AS EXPECTED  Bed locked and in lowest position, Bed alarm on. Treaded socks on. Call light and belongings within reach. Patient educated to call for assistance. Patient verbalized understanding. Hourly rounding in place.

## 2019-04-15 NOTE — PROGRESS NOTES
Hospital Medicine Daily Progress Note    Date of Service  4/15/2019    Chief Complaint  SOB    Hospital Course    68 y.o. female with multiple medical problems including type 2 diabetes mellitus, obesity, oxygen dependent COPD (3-1/2 L chronically), hypertension, admitted 4/14/2019 with progressively worsening shortness of breath, and need to increase her oxygen to 4 L because of that.  She also had a fever at home, with increased cough productive of whitish phlegm.  She continues to smoke.  Initial labs showed no leukocytosis.  Creatinine was 1.53.  Sodium was 134.  BNP was low.  Chest x-ray showed nothing acute, with stable enlargement of the cardiomediastinal silhouette.  She was rhonchorous and wheezy on exam.  She was felt to have COPD exacerbation, and started on IV steroids, oral doxycycline, and bronchodilators.        Interval Problem Update  4/15/2019 - I reviewed the patient's chart. There were no significant overnight events. Remains hemodynamically stable and afebrile. Stable on 4L O2 NC.      > I have personally seen and examined the patient today.  She is still short of breath, gets dyspneic with exertion.  She still coughs, but with scant clear phlegm.  No chest pain.  No other complaints.  She states she smokes 4 cigarettes/day, and is planning to quit. She is asking for the nicotine patch.       Consultants/Specialty  none    Code Status  Full    Disposition  Monitor on telemetry    Review of Systems  ROS     Pertinent positives/negatives as mentioned above.     A complete review of systems was personally done by me. All other systems were negative.       Physical Exam  Temp:  [36.1 °C (97 °F)-36.9 °C (98.4 °F)] 36.4 °C (97.5 °F)  Pulse:  [81-92] 89  Resp:  [17-24] 17  BP: (114-157)/(64-81) 114/64  SpO2:  [91 %-98 %] 92 %    Physical Exam   Constitutional: She is oriented to person, place, and time. She appears well-developed. No distress.   Obese. Body mass index is 37.13 kg/m².   HENT:   Head:  Normocephalic and atraumatic.   Mouth/Throat: Oropharynx is clear and moist. No oropharyngeal exudate.   Eyes: Pupils are equal, round, and reactive to light. Conjunctivae are normal. No scleral icterus.   Neck: Normal range of motion. Neck supple.   Cardiovascular: Normal rate and regular rhythm.  Exam reveals no gallop and no friction rub.    No murmur heard.  Pulmonary/Chest: Effort normal. No respiratory distress. She has no rales. She exhibits no tenderness.   Improved air entry on both lung fields, but still tight, with expiratory wheezing.   Abdominal: Soft. Bowel sounds are normal. She exhibits no distension. There is no tenderness. There is no rebound and no guarding.   Musculoskeletal: She exhibits edema ( Trace lower extremity edema bilaterally, chronic). She exhibits no tenderness.   Lymphadenopathy:     She has no cervical adenopathy.   Neurological: She is alert and oriented to person, place, and time. No cranial nerve deficit. Coordination normal.   Skin: Skin is warm and dry. No rash noted. She is not diaphoretic. No erythema. No pallor.   Psychiatric: She has a normal mood and affect. Her behavior is normal. Judgment and thought content normal.   Nursing note and vitals reviewed.         Fluids  No intake or output data in the 24 hours ending 04/15/19 1109    Laboratory  Recent Labs      04/14/19   0653   WBC  7.0   RBC  3.11*   HEMOGLOBIN  9.1*   HEMATOCRIT  30.2*   MCV  97.1   MCH  29.3   MCHC  30.1*   RDW  50.4*   PLATELETCT  163*   MPV  9.1     Recent Labs      04/14/19   0653   SODIUM  134*   POTASSIUM  4.6   CHLORIDE  103   CO2  24   GLUCOSE  185*   BUN  24*   CREATININE  1.53*   CALCIUM  8.6         Recent Labs      04/14/19   0653   BNPBTYPENAT  77           Imaging  DX-CHEST-PORTABLE (1 VIEW)   Final Result      Stable enlargement of the cardiomediastinal silhouette without acute cardiopulmonary disease evident.           Assessment/Plan  * Acute exacerbation of chronic obstructive  pulmonary disease (COPD) ([facility])- (present on admission)   Assessment & Plan    -Better, but still wheezy, slightly tight.  -Maintain on IV Solu-Medrol 40 mg IV every 6 hours.  Continue to monitor for clinical improvement, and wean steroids.  Continue bronchodilator.  Resume home dose Symbicort.  -Continue PRN bronchodilators per RT. continue oxygen supplements, wean as able back to her baseline chronic home oxygen.  -Continue oral doxycycline.  Check procalcitonin and trend.  If procalcitonin low, will aim for 3 days of Doxy.     Acute on chronic respiratory failure with hypoxia ([facility])- (present on admission)   Assessment & Plan    -Management of COPD as above.     Obesity (BMI 30-39.9)- (present on admission)   Assessment & Plan    - Body mass index is 37.13 kg/m²..  - outpatient referral for outpatient weight management.     Type 2 diabetes mellitus with hyperglycemia, and peripheral neuropathy, with long-term current use of insulin ([facility])- (present on admission)   Assessment & Plan    -Most recent hemoglobin A1c in January was 6.0.  -Continue home Lantus, and insulin sliding scale coverage while in-house.  Accu-Chek's before meals and at bedtime.  -Goal to keep BG between 140-180 per 2019 ADA guidelines.     Tobacco dependence- (present on admission)   Assessment & Plan    - Priya Callahan presents with COPD exacerbation.  The patient continues to smoke 4 cigarettes per day.  She has tried to quit before. I spent 5 minutes counseling the patient on cessation techniques and resources were offered including nicotine patches, gum, along with medical treatment with wellbutrin and chantix. The patient understands continuing to smoke could lead to complications such as lung disease, heart attack, stroke and death.  The benefits of stopping were also presented to her. The patient verbalized that she is ready to quit. She has set goals of 7 days to be completely smoke free.. The patient will follow up again in 2  weeks with PCP to check progress. CPT CODE: 94305 (3-10 minutes tobacco counseling).  - Will start on nicotine replacement therapy while in-house.     Chronic use of opiate drugs therapeutic purposes- (present on admission)   Assessment & Plan    -Continue oxycodone as needed.     Hypothyroidism- (present on admission)   Assessment & Plan    -Recent TSH is normal. Continue Synthroid.     Chronic kidney disease, stage 3 (HCC)- (present on admission)   Assessment & Plan    -Appears stable.  - avoid nephrotoxins, and continue to renally dose all medications.     Major depression in full remission (HCC)- (present on admission)   Assessment & Plan    -Continue Cymbalta, trazodone, and Elavil.          VTE prophylaxis: heparin SQ

## 2019-04-15 NOTE — RESPIRATORY CARE
COPD Education by COPD Clinical Educator  4/14/2019 at 1:30 PM by Cristela Martinez    Patient interviewed by COPD education team.  A comprehensive packet including information about COPD, treatments, and smoking cessation given and discussed along with a spacer to encourage proper inhaler technique.

## 2019-04-16 PROBLEM — I27.21 PULMONARY ARTERIAL HYPERTENSION (HCC): Status: RESOLVED | Noted: 2019-04-16 | Resolved: 2019-04-16

## 2019-04-16 PROBLEM — I27.21 PULMONARY ARTERIAL HYPERTENSION (HCC): Status: ACTIVE | Noted: 2019-04-16

## 2019-04-16 PROBLEM — I27.20 PULMONARY HTN (HCC): Status: ACTIVE | Noted: 2019-04-16

## 2019-04-16 LAB
BACTERIA UR CULT: NORMAL
GLUCOSE BLD-MCNC: 154 MG/DL (ref 65–99)
GLUCOSE BLD-MCNC: 173 MG/DL (ref 65–99)
GLUCOSE BLD-MCNC: 204 MG/DL (ref 65–99)
GLUCOSE BLD-MCNC: 252 MG/DL (ref 65–99)
GLUCOSE BLD-MCNC: 274 MG/DL (ref 65–99)
PROCALCITONIN SERPL-MCNC: 0.43 NG/ML
SIGNIFICANT IND 70042: NORMAL
SITE SITE: NORMAL
SOURCE SOURCE: NORMAL

## 2019-04-16 PROCEDURE — A9270 NON-COVERED ITEM OR SERVICE: HCPCS | Performed by: HOSPITALIST

## 2019-04-16 PROCEDURE — 36415 COLL VENOUS BLD VENIPUNCTURE: CPT

## 2019-04-16 PROCEDURE — 99233 SBSQ HOSP IP/OBS HIGH 50: CPT | Performed by: HOSPITALIST

## 2019-04-16 PROCEDURE — 700101 HCHG RX REV CODE 250: Performed by: HOSPITALIST

## 2019-04-16 PROCEDURE — 700102 HCHG RX REV CODE 250 W/ 637 OVERRIDE(OP): Performed by: HOSPITALIST

## 2019-04-16 PROCEDURE — 82962 GLUCOSE BLOOD TEST: CPT | Mod: 91

## 2019-04-16 PROCEDURE — 700111 HCHG RX REV CODE 636 W/ 250 OVERRIDE (IP): Performed by: INTERNAL MEDICINE

## 2019-04-16 PROCEDURE — 94640 AIRWAY INHALATION TREATMENT: CPT

## 2019-04-16 PROCEDURE — 84145 PROCALCITONIN (PCT): CPT

## 2019-04-16 PROCEDURE — A9270 NON-COVERED ITEM OR SERVICE: HCPCS | Performed by: INTERNAL MEDICINE

## 2019-04-16 PROCEDURE — 770020 HCHG ROOM/CARE - TELE (206)

## 2019-04-16 PROCEDURE — 700102 HCHG RX REV CODE 250 W/ 637 OVERRIDE(OP): Performed by: INTERNAL MEDICINE

## 2019-04-16 PROCEDURE — 94760 N-INVAS EAR/PLS OXIMETRY 1: CPT

## 2019-04-16 PROCEDURE — 700111 HCHG RX REV CODE 636 W/ 250 OVERRIDE (IP): Performed by: HOSPITALIST

## 2019-04-16 RX ORDER — POLYETHYLENE GLYCOL 3350 17 G/17G
1 POWDER, FOR SOLUTION ORAL DAILY
Status: DISCONTINUED | OUTPATIENT
Start: 2019-04-16 | End: 2019-04-19 | Stop reason: HOSPADM

## 2019-04-16 RX ORDER — AMOXICILLIN 250 MG
2 CAPSULE ORAL DAILY
Status: DISCONTINUED | OUTPATIENT
Start: 2019-04-16 | End: 2019-04-19 | Stop reason: HOSPADM

## 2019-04-16 RX ADMIN — TRAZODONE HYDROCHLORIDE 150 MG: 50 TABLET ORAL at 21:22

## 2019-04-16 RX ADMIN — DOXYCYCLINE 100 MG: 100 TABLET, FILM COATED ORAL at 17:15

## 2019-04-16 RX ADMIN — METHYLPREDNISOLONE SODIUM SUCCINATE 40 MG: 40 INJECTION, POWDER, FOR SOLUTION INTRAMUSCULAR; INTRAVENOUS at 01:15

## 2019-04-16 RX ADMIN — METHYLPREDNISOLONE SODIUM SUCCINATE 40 MG: 40 INJECTION, POWDER, FOR SOLUTION INTRAMUSCULAR; INTRAVENOUS at 17:14

## 2019-04-16 RX ADMIN — ROSUVASTATIN CALCIUM 10 MG: 10 TABLET, FILM COATED ORAL at 17:15

## 2019-04-16 RX ADMIN — OXYCODONE HYDROCHLORIDE 10 MG: 10 TABLET ORAL at 16:28

## 2019-04-16 RX ADMIN — METHYLPREDNISOLONE SODIUM SUCCINATE 40 MG: 40 INJECTION, POWDER, FOR SOLUTION INTRAMUSCULAR; INTRAVENOUS at 23:15

## 2019-04-16 RX ADMIN — NICOTINE 14 MG: 14 PATCH, EXTENDED RELEASE TRANSDERMAL at 05:09

## 2019-04-16 RX ADMIN — OXYCODONE HYDROCHLORIDE 10 MG: 10 TABLET ORAL at 05:09

## 2019-04-16 RX ADMIN — HEPARIN SODIUM 5000 UNITS: 5000 INJECTION, SOLUTION INTRAVENOUS; SUBCUTANEOUS at 05:08

## 2019-04-16 RX ADMIN — SENNOSIDES, DOCUSATE SODIUM 2 TABLET: 50; 8.6 TABLET, FILM COATED ORAL at 05:09

## 2019-04-16 RX ADMIN — HEPARIN SODIUM 5000 UNITS: 5000 INJECTION, SOLUTION INTRAVENOUS; SUBCUTANEOUS at 13:29

## 2019-04-16 RX ADMIN — INSULIN HUMAN 3 UNITS: 100 INJECTION, SOLUTION PARENTERAL at 05:20

## 2019-04-16 RX ADMIN — AMITRIPTYLINE HYDROCHLORIDE 50 MG: 25 TABLET, FILM COATED ORAL at 21:22

## 2019-04-16 RX ADMIN — BUDESONIDE AND FORMOTEROL FUMARATE DIHYDRATE 2 PUFF: 160; 4.5 AEROSOL RESPIRATORY (INHALATION) at 05:06

## 2019-04-16 RX ADMIN — POLYETHYLENE GLYCOL 3350 1 PACKET: 17 POWDER, FOR SOLUTION ORAL at 17:23

## 2019-04-16 RX ADMIN — IPRATROPIUM BROMIDE AND ALBUTEROL SULFATE 3 ML: .5; 3 SOLUTION RESPIRATORY (INHALATION) at 19:20

## 2019-04-16 RX ADMIN — INSULIN HUMAN 7 UNITS: 100 INJECTION, SOLUTION PARENTERAL at 11:10

## 2019-04-16 RX ADMIN — DULOXETINE HYDROCHLORIDE 60 MG: 60 CAPSULE, DELAYED RELEASE ORAL at 05:09

## 2019-04-16 RX ADMIN — INSULIN HUMAN 4 UNITS: 100 INJECTION, SOLUTION PARENTERAL at 17:16

## 2019-04-16 RX ADMIN — IPRATROPIUM BROMIDE AND ALBUTEROL SULFATE 3 ML: .5; 3 SOLUTION RESPIRATORY (INHALATION) at 07:09

## 2019-04-16 RX ADMIN — OXYCODONE HYDROCHLORIDE 10 MG: 10 TABLET ORAL at 23:15

## 2019-04-16 RX ADMIN — CYCLOBENZAPRINE HYDROCHLORIDE 5 MG: 10 TABLET, FILM COATED ORAL at 21:23

## 2019-04-16 RX ADMIN — METHYLPREDNISOLONE SODIUM SUCCINATE 40 MG: 40 INJECTION, POWDER, FOR SOLUTION INTRAMUSCULAR; INTRAVENOUS at 05:07

## 2019-04-16 RX ADMIN — LEVOTHYROXINE SODIUM 75 MCG: 75 TABLET ORAL at 05:09

## 2019-04-16 RX ADMIN — DOXYCYCLINE 100 MG: 100 TABLET, FILM COATED ORAL at 05:09

## 2019-04-16 RX ADMIN — IPRATROPIUM BROMIDE AND ALBUTEROL SULFATE 3 ML: .5; 3 SOLUTION RESPIRATORY (INHALATION) at 15:24

## 2019-04-16 RX ADMIN — METHYLPREDNISOLONE SODIUM SUCCINATE 40 MG: 40 INJECTION, POWDER, FOR SOLUTION INTRAMUSCULAR; INTRAVENOUS at 11:09

## 2019-04-16 RX ADMIN — BUDESONIDE AND FORMOTEROL FUMARATE DIHYDRATE 2 PUFF: 160; 4.5 AEROSOL RESPIRATORY (INHALATION) at 17:14

## 2019-04-16 RX ADMIN — FUROSEMIDE 20 MG: 20 TABLET ORAL at 05:09

## 2019-04-16 RX ADMIN — CYCLOBENZAPRINE HYDROCHLORIDE 5 MG: 10 TABLET, FILM COATED ORAL at 01:17

## 2019-04-16 RX ADMIN — POTASSIUM CHLORIDE 20 MEQ: 1500 TABLET, EXTENDED RELEASE ORAL at 05:13

## 2019-04-16 RX ADMIN — INSULIN GLARGINE 20 UNITS: 100 INJECTION, SOLUTION SUBCUTANEOUS at 17:22

## 2019-04-16 RX ADMIN — IPRATROPIUM BROMIDE AND ALBUTEROL SULFATE 3 ML: .5; 3 SOLUTION RESPIRATORY (INHALATION) at 04:12

## 2019-04-16 RX ADMIN — AMLODIPINE BESYLATE 10 MG: 10 TABLET ORAL at 05:09

## 2019-04-16 RX ADMIN — INSULIN HUMAN 7 UNITS: 100 INJECTION, SOLUTION PARENTERAL at 21:24

## 2019-04-16 ASSESSMENT — ENCOUNTER SYMPTOMS
SHORTNESS OF BREATH: 1
NAUSEA: 0
FALLS: 0
BLURRED VISION: 0
CONSTIPATION: 0
TREMORS: 0
PHOTOPHOBIA: 0
STRIDOR: 0
VOMITING: 0
EYE PAIN: 0
TINGLING: 0
DIARRHEA: 0
DOUBLE VISION: 0
SORE THROAT: 0
WEAKNESS: 1
HEMOPTYSIS: 0
DIZZINESS: 0
SPUTUM PRODUCTION: 1
FLANK PAIN: 0
PND: 1
NECK PAIN: 0
BRUISES/BLEEDS EASILY: 0
MYALGIAS: 0
ORTHOPNEA: 1
POLYDIPSIA: 0
PALPITATIONS: 1
CHILLS: 0
COUGH: 1
BLOOD IN STOOL: 0
HEARTBURN: 0
HEADACHES: 0
FEVER: 0
ABDOMINAL PAIN: 0
SINUS PAIN: 1
CLAUDICATION: 0
BACK PAIN: 0
WHEEZING: 1
DIAPHORESIS: 0

## 2019-04-16 NOTE — CARE PLAN
Problem: Oxygenation:  Goal: Maintain adequate oxygenation dependent on patient condition    Intervention: Levels of oxygenation will improve to baseline  4L NC       Problem: Bronchoconstriction:  Goal: Improve in air movement and diminished wheezing    Intervention: Implement inhaled treatments  Duo Q6  Sym BID

## 2019-04-16 NOTE — CARE PLAN
Problem: Safety  Goal: Will remain free from injury  Outcome: PROGRESSING AS EXPECTED  Pt verbalizes understanding of call light and uses it appropriately.

## 2019-04-16 NOTE — CARE PLAN
Problem: Infection  Goal: Will remain free from infection  Outcome: PROGRESSING AS EXPECTED  Pt educated on the importance of hand washing to reduce the risk of infection. Pt verbally understands and performs hand hygiene to reduce to risks of infection.     Problem: Bowel/Gastric:  Goal: Normal bowel function is maintained or improved  Outcome: PROGRESSING SLOWER THAN EXPECTED  Pt educated on the use of stool softeners to aide in bowel maintenance and the importance of regular bowel movements. Pt currently has no bowel protocol ordered, RN to notify physician

## 2019-04-16 NOTE — CARE PLAN
Problem: Communication  Goal: The ability to communicate needs accurately and effectively will improve  Outcome: PROGRESSING AS EXPECTED  Pt communicates effectively.

## 2019-04-16 NOTE — CARE PLAN
Problem: Communication  Goal: The ability to communicate needs accurately and effectively will improve  Outcome: PROGRESSING AS EXPECTED  Communication board updated. All pt questions answered. No further questions at this time. Pt encouraged to voice feelings and ask questions as they arise.    Problem: Knowledge Deficit  Goal: Knowledge of disease process/condition, treatment plan, diagnostic tests, and medications will improve  Outcome: PROGRESSING SLOWER THAN EXPECTED  Discussed POC and medications with patient, patient verbalized understanding. Patient requires frequent reorientation on POC

## 2019-04-16 NOTE — PROGRESS NOTES
Pt A&Ox4. VSS on 4L NC. Pt voids frequently with assist to BSC using FWW. Pt uses call light appropriately.

## 2019-04-17 LAB
ALBUMIN SERPL BCP-MCNC: 3.6 G/DL (ref 3.2–4.9)
ALBUMIN/GLOB SERPL: 1.3 G/DL
ALP SERPL-CCNC: 48 U/L (ref 30–99)
ALT SERPL-CCNC: 75 U/L (ref 2–50)
ANION GAP SERPL CALC-SCNC: 13 MMOL/L (ref 0–11.9)
AST SERPL-CCNC: 70 U/L (ref 12–45)
BASOPHILS # BLD AUTO: 0.1 % (ref 0–1.8)
BASOPHILS # BLD: 0.01 K/UL (ref 0–0.12)
BILIRUB SERPL-MCNC: 0.3 MG/DL (ref 0.1–1.5)
BUN SERPL-MCNC: 61 MG/DL (ref 8–22)
CALCIUM SERPL-MCNC: 8.8 MG/DL (ref 8.5–10.5)
CHLORIDE SERPL-SCNC: 103 MMOL/L (ref 96–112)
CO2 SERPL-SCNC: 20 MMOL/L (ref 20–33)
CREAT SERPL-MCNC: 1.85 MG/DL (ref 0.5–1.4)
EOSINOPHIL # BLD AUTO: 0 K/UL (ref 0–0.51)
EOSINOPHIL NFR BLD: 0 % (ref 0–6.9)
ERYTHROCYTE [DISTWIDTH] IN BLOOD BY AUTOMATED COUNT: 47.3 FL (ref 35.9–50)
GLOBULIN SER CALC-MCNC: 2.8 G/DL (ref 1.9–3.5)
GLUCOSE BLD-MCNC: 149 MG/DL (ref 65–99)
GLUCOSE BLD-MCNC: 219 MG/DL (ref 65–99)
GLUCOSE BLD-MCNC: 225 MG/DL (ref 65–99)
GLUCOSE BLD-MCNC: 292 MG/DL (ref 65–99)
GLUCOSE SERPL-MCNC: 199 MG/DL (ref 65–99)
HCT VFR BLD AUTO: 31.7 % (ref 37–47)
HGB BLD-MCNC: 9.7 G/DL (ref 12–16)
IMM GRANULOCYTES # BLD AUTO: 0.25 K/UL (ref 0–0.11)
IMM GRANULOCYTES NFR BLD AUTO: 2 % (ref 0–0.9)
LYMPHOCYTES # BLD AUTO: 1.01 K/UL (ref 1–4.8)
LYMPHOCYTES NFR BLD: 8 % (ref 22–41)
MAGNESIUM SERPL-MCNC: 1.9 MG/DL (ref 1.5–2.5)
MCH RBC QN AUTO: 28.8 PG (ref 27–33)
MCHC RBC AUTO-ENTMCNC: 30.6 G/DL (ref 33.6–35)
MCV RBC AUTO: 94.1 FL (ref 81.4–97.8)
MONOCYTES # BLD AUTO: 0.32 K/UL (ref 0–0.85)
MONOCYTES NFR BLD AUTO: 2.5 % (ref 0–13.4)
NEUTROPHILS # BLD AUTO: 10.97 K/UL (ref 2–7.15)
NEUTROPHILS NFR BLD: 87.4 % (ref 44–72)
NRBC # BLD AUTO: 0 K/UL
NRBC BLD-RTO: 0 /100 WBC
PHOSPHATE SERPL-MCNC: 4.9 MG/DL (ref 2.5–4.5)
PLATELET # BLD AUTO: 181 K/UL (ref 164–446)
PMV BLD AUTO: 9.7 FL (ref 9–12.9)
POTASSIUM SERPL-SCNC: 6 MMOL/L (ref 3.6–5.5)
PROT SERPL-MCNC: 6.4 G/DL (ref 6–8.2)
RBC # BLD AUTO: 3.37 M/UL (ref 4.2–5.4)
SODIUM SERPL-SCNC: 136 MMOL/L (ref 135–145)
WBC # BLD AUTO: 12.6 K/UL (ref 4.8–10.8)

## 2019-04-17 PROCEDURE — 700101 HCHG RX REV CODE 250: Performed by: HOSPITALIST

## 2019-04-17 PROCEDURE — 700102 HCHG RX REV CODE 250 W/ 637 OVERRIDE(OP): Performed by: INTERNAL MEDICINE

## 2019-04-17 PROCEDURE — A9270 NON-COVERED ITEM OR SERVICE: HCPCS | Performed by: INTERNAL MEDICINE

## 2019-04-17 PROCEDURE — 700102 HCHG RX REV CODE 250 W/ 637 OVERRIDE(OP): Performed by: HOSPITALIST

## 2019-04-17 PROCEDURE — 700111 HCHG RX REV CODE 636 W/ 250 OVERRIDE (IP): Performed by: HOSPITALIST

## 2019-04-17 PROCEDURE — 36415 COLL VENOUS BLD VENIPUNCTURE: CPT

## 2019-04-17 PROCEDURE — 770020 HCHG ROOM/CARE - TELE (206)

## 2019-04-17 PROCEDURE — 94760 N-INVAS EAR/PLS OXIMETRY 1: CPT

## 2019-04-17 PROCEDURE — 82962 GLUCOSE BLOOD TEST: CPT | Mod: 91

## 2019-04-17 PROCEDURE — 83735 ASSAY OF MAGNESIUM: CPT

## 2019-04-17 PROCEDURE — 94640 AIRWAY INHALATION TREATMENT: CPT

## 2019-04-17 PROCEDURE — A9270 NON-COVERED ITEM OR SERVICE: HCPCS | Performed by: HOSPITALIST

## 2019-04-17 PROCEDURE — 80053 COMPREHEN METABOLIC PANEL: CPT

## 2019-04-17 PROCEDURE — 85025 COMPLETE CBC W/AUTO DIFF WBC: CPT

## 2019-04-17 PROCEDURE — 99233 SBSQ HOSP IP/OBS HIGH 50: CPT | Performed by: HOSPITALIST

## 2019-04-17 PROCEDURE — 84100 ASSAY OF PHOSPHORUS: CPT

## 2019-04-17 PROCEDURE — 700111 HCHG RX REV CODE 636 W/ 250 OVERRIDE (IP): Performed by: INTERNAL MEDICINE

## 2019-04-17 RX ORDER — INSULIN GLARGINE 100 [IU]/ML
25 INJECTION, SOLUTION SUBCUTANEOUS EVERY EVENING
Status: DISCONTINUED | OUTPATIENT
Start: 2019-04-17 | End: 2019-04-19 | Stop reason: HOSPADM

## 2019-04-17 RX ORDER — LISINOPRIL 5 MG/1
5 TABLET ORAL
Status: DISCONTINUED | OUTPATIENT
Start: 2019-04-17 | End: 2019-04-18

## 2019-04-17 RX ORDER — HYDRALAZINE HYDROCHLORIDE 20 MG/ML
10 INJECTION INTRAMUSCULAR; INTRAVENOUS EVERY 6 HOURS PRN
Status: DISCONTINUED | OUTPATIENT
Start: 2019-04-17 | End: 2019-04-19 | Stop reason: HOSPADM

## 2019-04-17 RX ORDER — METHYLPREDNISOLONE SODIUM SUCCINATE 40 MG/ML
40 INJECTION, POWDER, LYOPHILIZED, FOR SOLUTION INTRAMUSCULAR; INTRAVENOUS EVERY 12 HOURS
Status: DISCONTINUED | OUTPATIENT
Start: 2019-04-17 | End: 2019-04-19 | Stop reason: HOSPADM

## 2019-04-17 RX ADMIN — SENNOSIDES, DOCUSATE SODIUM 2 TABLET: 50; 8.6 TABLET, FILM COATED ORAL at 05:36

## 2019-04-17 RX ADMIN — INSULIN HUMAN 7 UNITS: 100 INJECTION, SOLUTION PARENTERAL at 10:39

## 2019-04-17 RX ADMIN — AMITRIPTYLINE HYDROCHLORIDE 50 MG: 25 TABLET, FILM COATED ORAL at 21:19

## 2019-04-17 RX ADMIN — LEVOTHYROXINE SODIUM 75 MCG: 75 TABLET ORAL at 05:37

## 2019-04-17 RX ADMIN — INSULIN HUMAN 4 UNITS: 100 INJECTION, SOLUTION PARENTERAL at 05:34

## 2019-04-17 RX ADMIN — METHYLPREDNISOLONE SODIUM SUCCINATE 40 MG: 40 INJECTION, POWDER, FOR SOLUTION INTRAMUSCULAR; INTRAVENOUS at 06:00

## 2019-04-17 RX ADMIN — OXYCODONE HYDROCHLORIDE 10 MG: 10 TABLET ORAL at 05:36

## 2019-04-17 RX ADMIN — INSULIN HUMAN 4 UNITS: 100 INJECTION, SOLUTION PARENTERAL at 21:22

## 2019-04-17 RX ADMIN — OXYCODONE HYDROCHLORIDE 10 MG: 10 TABLET ORAL at 21:19

## 2019-04-17 RX ADMIN — IPRATROPIUM BROMIDE AND ALBUTEROL SULFATE 3 ML: .5; 3 SOLUTION RESPIRATORY (INHALATION) at 02:30

## 2019-04-17 RX ADMIN — IPRATROPIUM BROMIDE AND ALBUTEROL SULFATE 3 ML: .5; 3 SOLUTION RESPIRATORY (INHALATION) at 07:43

## 2019-04-17 RX ADMIN — POTASSIUM CHLORIDE 20 MEQ: 1500 TABLET, EXTENDED RELEASE ORAL at 05:37

## 2019-04-17 RX ADMIN — IPRATROPIUM BROMIDE AND ALBUTEROL SULFATE 3 ML: .5; 3 SOLUTION RESPIRATORY (INHALATION) at 16:11

## 2019-04-17 RX ADMIN — INSULIN GLARGINE 25 UNITS: 100 INJECTION, SOLUTION SUBCUTANEOUS at 17:52

## 2019-04-17 RX ADMIN — OXYCODONE HYDROCHLORIDE 10 MG: 10 TABLET ORAL at 12:38

## 2019-04-17 RX ADMIN — BUDESONIDE AND FORMOTEROL FUMARATE DIHYDRATE 2 PUFF: 160; 4.5 AEROSOL RESPIRATORY (INHALATION) at 18:00

## 2019-04-17 RX ADMIN — HEPARIN SODIUM 5000 UNITS: 5000 INJECTION, SOLUTION INTRAVENOUS; SUBCUTANEOUS at 15:01

## 2019-04-17 RX ADMIN — METHYLPREDNISOLONE SODIUM SUCCINATE 40 MG: 40 INJECTION, POWDER, FOR SOLUTION INTRAMUSCULAR; INTRAVENOUS at 10:35

## 2019-04-17 RX ADMIN — AMLODIPINE BESYLATE 10 MG: 10 TABLET ORAL at 05:37

## 2019-04-17 RX ADMIN — DULOXETINE HYDROCHLORIDE 60 MG: 60 CAPSULE, DELAYED RELEASE ORAL at 05:36

## 2019-04-17 RX ADMIN — BUDESONIDE AND FORMOTEROL FUMARATE DIHYDRATE 2 PUFF: 160; 4.5 AEROSOL RESPIRATORY (INHALATION) at 05:44

## 2019-04-17 RX ADMIN — FUROSEMIDE 20 MG: 20 TABLET ORAL at 05:36

## 2019-04-17 RX ADMIN — DOXYCYCLINE 100 MG: 100 TABLET, FILM COATED ORAL at 17:52

## 2019-04-17 RX ADMIN — DOXYCYCLINE 100 MG: 100 TABLET, FILM COATED ORAL at 05:37

## 2019-04-17 RX ADMIN — ROSUVASTATIN CALCIUM 10 MG: 10 TABLET, FILM COATED ORAL at 17:52

## 2019-04-17 RX ADMIN — TRAZODONE HYDROCHLORIDE 150 MG: 50 TABLET ORAL at 21:19

## 2019-04-17 RX ADMIN — IPRATROPIUM BROMIDE AND ALBUTEROL SULFATE 3 ML: .5; 3 SOLUTION RESPIRATORY (INHALATION) at 22:32

## 2019-04-17 RX ADMIN — NICOTINE 14 MG: 14 PATCH, EXTENDED RELEASE TRANSDERMAL at 05:36

## 2019-04-17 RX ADMIN — METHYLPREDNISOLONE SODIUM SUCCINATE 40 MG: 40 INJECTION, POWDER, FOR SOLUTION INTRAMUSCULAR; INTRAVENOUS at 17:52

## 2019-04-17 ASSESSMENT — ENCOUNTER SYMPTOMS
POLYDIPSIA: 0
BLOOD IN STOOL: 0
TINGLING: 0
FALLS: 0
SORE THROAT: 0
EYE PAIN: 0
DIARRHEA: 0
SPUTUM PRODUCTION: 1
SINUS PAIN: 1
BRUISES/BLEEDS EASILY: 0
STRIDOR: 0
DOUBLE VISION: 0
DIAPHORESIS: 0
WEAKNESS: 1
VOMITING: 0
COUGH: 1
HEARTBURN: 0
CHILLS: 0
ORTHOPNEA: 1
ABDOMINAL PAIN: 0
DIZZINESS: 0
NAUSEA: 0
PHOTOPHOBIA: 0
MYALGIAS: 0
NECK PAIN: 0
TREMORS: 0
PND: 1
HEADACHES: 0
SHORTNESS OF BREATH: 1
WHEEZING: 1
FLANK PAIN: 0
PALPITATIONS: 1
FEVER: 0
BACK PAIN: 0
HEMOPTYSIS: 0
BLURRED VISION: 0
CONSTIPATION: 0
CLAUDICATION: 0

## 2019-04-17 NOTE — RESPIRATORY CARE
COPD EDUCATION by COPD CLINICAL EDUCATOR  (Phone: [phone])  4/17/2019 at 3:49 PM by Idalia Lutz     Patient was interviewed by Respiratory Education team for COPD program. Patient refused full COPD program but had some additional questions. All were answered and our contact information was provided -should she have others.

## 2019-04-17 NOTE — PROGRESS NOTES
Hospital Medicine Daily Progress Note    Date of Service  4/17/2019    Chief Complaint  SOB    Hospital Course    68 y.o. female with multiple medical problems including type 2 diabetes mellitus, obesity, oxygen dependent COPD (3-1/2 L chronically), hypertension, admitted 4/14/2019 with progressively worsening shortness of breath, and need to increase her oxygen to 4 L because of that.  She also had a fever at home, with increased cough productive of whitish phlegm.  She continues to smoke.  Initial labs showed no leukocytosis.  Creatinine was 1.53.  Sodium was 134.  BNP was low.  Chest x-ray showed nothing acute, with stable enlargement of the cardiomediastinal silhouette.  She was rhonchorous and wheezy on exam.  She was felt to have COPD exacerbation, and started on IV steroids, oral doxycycline, and bronchodilators.        Interval Problem Update  4/15/2019 - I reviewed the patient's chart. There were no significant overnight events. Remains hemodynamically stable and afebrile. Stable on 4L O2 NC.    4/16: Patient seen and examined by me today.  She significantly wheezing and I will continue the same dose of IV Solu-Medrol.  She continues to be on 4 L of O2 and will wean off as necessary.  Pro-calcitonin was elevated and this may be due to CKD, will continue to trend and if persistent will start antibiotics.  4/17: Patient found to be persistently wheezing.  We will continue with IV Solu-Medrol for now.  No fever reported.  Blood pressures uncontrolled today. Started pt on lisinopril since she has CKD.    Consultants/Specialty  none    Code Status  Full    Disposition  Monitor on telemetry    Review of Systems  Review of Systems   Constitutional: Negative for chills, diaphoresis, fever and malaise/fatigue.   HENT: Positive for congestion and sinus pain. Negative for ear discharge, ear pain, hearing loss, nosebleeds, sore throat and tinnitus.    Eyes: Negative for blurred vision, double vision, photophobia and  pain.   Respiratory: Positive for cough, sputum production, shortness of breath and wheezing. Negative for hemoptysis and stridor.    Cardiovascular: Positive for palpitations, orthopnea, leg swelling and PND. Negative for chest pain and claudication.   Gastrointestinal: Negative for abdominal pain, blood in stool, constipation, diarrhea, heartburn, melena, nausea and vomiting.   Genitourinary: Negative for dysuria, flank pain, frequency, hematuria and urgency.   Musculoskeletal: Negative for back pain, falls, joint pain, myalgias and neck pain.   Skin: Negative for itching and rash.   Neurological: Positive for weakness. Negative for dizziness, tingling, tremors and headaches.   Endo/Heme/Allergies: Negative for environmental allergies and polydipsia. Does not bruise/bleed easily.           Physical Exam  Temp:  [36 °C (96.8 °F)-37 °C (98.6 °F)] 37 °C (98.6 °F)  Pulse:  [69-80] 76  Resp:  [18-20] 18  BP: (138-171)/(54-74) 171/74  SpO2:  [93 %-98 %] 95 %    Physical Exam   Constitutional: She is oriented to person, place, and time. She appears well-developed. No distress.   Obese. Body mass index is 37.13 kg/m².   HENT:   Head: Normocephalic and atraumatic.   Mouth/Throat: Oropharynx is clear and moist. No oropharyngeal exudate.   Eyes: Pupils are equal, round, and reactive to light. Conjunctivae are normal. No scleral icterus.   Neck: Normal range of motion. Neck supple.   Cardiovascular: Normal rate and regular rhythm.  Exam reveals no gallop and no friction rub.    No murmur heard.  Pulmonary/Chest: Effort normal. No respiratory distress. She has no rales. She exhibits no tenderness.   Improved air entry on both lung fields, but still tight, with expiratory wheezing.   Abdominal: Soft. Bowel sounds are normal. She exhibits no distension. There is no tenderness. There is no rebound and no guarding.   Musculoskeletal: She exhibits edema ( Trace lower extremity edema bilaterally, chronic). She exhibits no  tenderness.   Lymphadenopathy:     She has no cervical adenopathy.   Neurological: She is alert and oriented to person, place, and time. No cranial nerve deficit. Coordination normal.   Skin: Skin is warm and dry. No rash noted. She is not diaphoretic. No erythema. No pallor.   Nursing note and vitals reviewed.         Fluids    Intake/Output Summary (Last 24 hours) at 04/17/19 1653  Last data filed at 04/17/19 1404   Gross per 24 hour   Intake              300 ml   Output             1600 ml   Net            -1300 ml       Laboratory  Recent Labs      04/17/19   0820   WBC  12.6*   RBC  3.37*   HEMOGLOBIN  9.7*   HEMATOCRIT  31.7*   MCV  94.1   MCH  28.8   MCHC  30.6*   RDW  47.3   PLATELETCT  181   MPV  9.7                       Imaging  DX-CHEST-PORTABLE (1 VIEW)   Final Result      Stable enlargement of the cardiomediastinal silhouette without acute cardiopulmonary disease evident.           Assessment/Plan  * Acute exacerbation of chronic obstructive pulmonary disease (COPD) (HCC)- (present on admission)   Assessment & Plan    -Persistently wheezing  -Maintain on IV Solu-Medrol 40 mg IV every 12 hours.  Continue to monitor for clinical improvement, and wean steroids.  Continue bronchodilator.  Resume home dose Symbicort.  -Continue PRN bronchodilators per RT. continue oxygen supplements, wean as able back to her baseline chronic home oxygen.  -Continue oral doxycycline. pro calcitonin +- No infiltrate, fevers may be possible due to renal failure we will hold off on additional antibiotics for now     Acute on chronic respiratory failure with hypoxia (HCC)- (present on admission)   Assessment & Plan    Patient takes 3 L and is currently on 4 L of oxygen here in the hospital.  Likely secondary to COPD exacerbation  Continue to wean off as necessary  RT protocol  History of hypercapnia will avoid all sedative medications     Pulmonary HTN (HCC)   Assessment & Plan    Likely secondary to obesity, COPD, sleep  apnea  Last RSVP was 60  Monitor volume status continue Lasix 20 mg     Exposure to hepatitis C- (present on admission)   Assessment & Plan    Hepatitis C positive     Obesity (BMI 30-39.9)- (present on admission)   Assessment & Plan    - Body mass index is 37.13 kg/m²..  - outpatient referral for outpatient weight management.     Type 2 diabetes mellitus with hyperglycemia, and peripheral neuropathy, with long-term current use of insulin (HCC)- (present on admission)   Assessment & Plan    -Most recent hemoglobin A1c in January was 6.0.  -Continue home Lantus, and insulin sliding scale coverage while in-house.  Accu-Chek's before meals and at bedtime.  -Goal to keep BG between 140-180 per 2019 ADA guidelines.  - Uncontrolled due to steroids increased his lantus dose to 25U     Tobacco dependence- (present on admission)   Assessment & Plan    - Priya Callahan presents with COPD exacerbation.  The patient continues to smoke 4 cigarettes per day.  She has tried to quit before. I spent 5 minutes counseling the patient on cessation techniques and resources were offered including nicotine patches, gum, along with medical treatment with wellbutrin and chantix. The patient understands continuing to smoke could lead to complications such as lung disease, heart attack, stroke and death.  The benefits of stopping were also presented to her. The patient verbalized that she is ready to quit. She has set goals of 7 days to be completely smoke free.. The patient will follow up again in 2 weeks with PCP to check progress. CPT CODE: 08611 (3-10 minutes tobacco counseling).  - Will start on nicotine replacement therapy while in-house.     Chronic use of opiate drugs therapeutic purposes- (present on admission)   Assessment & Plan    -Continue oxycodone as needed.     Hypothyroidism- (present on admission)   Assessment & Plan    -Recent TSH is normal. Continue Synthroid.     Chronic kidney disease, stage 3 (HCC)- (present  on admission)   Assessment & Plan    -Appears stable.  - avoid nephrotoxins, and continue to renally dose all medications.  Start lisinopril      Major depression in full remission (HCC)- (present on admission)   Assessment & Plan    -Continue Cymbalta, trazodone, and Elavil.          VTE prophylaxis: heparin SQ

## 2019-04-17 NOTE — PROGRESS NOTES
Notified Dr. Castro regarding elevated BP. Pt states she is in pain, orders to see if pain medication administered helps with BP.

## 2019-04-17 NOTE — PROGRESS NOTES
Hospital Medicine Daily Progress Note    Date of Service  4/16/2019    Chief Complaint  SOB    Hospital Course    68 y.o. female with multiple medical problems including type 2 diabetes mellitus, obesity, oxygen dependent COPD (3-1/2 L chronically), hypertension, admitted 4/14/2019 with progressively worsening shortness of breath, and need to increase her oxygen to 4 L because of that.  She also had a fever at home, with increased cough productive of whitish phlegm.  She continues to smoke.  Initial labs showed no leukocytosis.  Creatinine was 1.53.  Sodium was 134.  BNP was low.  Chest x-ray showed nothing acute, with stable enlargement of the cardiomediastinal silhouette.  She was rhonchorous and wheezy on exam.  She was felt to have COPD exacerbation, and started on IV steroids, oral doxycycline, and bronchodilators.        Interval Problem Update  4/15/2019 - I reviewed the patient's chart. There were no significant overnight events. Remains hemodynamically stable and afebrile. Stable on 4L O2 NC.      4/16: Patient seen and examined by me today.  She significantly wheezing and I will continue the same dose of IV Solu-Medrol.  She continues to be on 4 L of O2 and will wean off as necessary.  Pro-calcitonin was elevated and this may be due to CKD, will continue to trend and if persistent will start antibiotics.    Consultants/Specialty  none    Code Status  Full    Disposition  Monitor on telemetry    Review of Systems  Review of Systems   Constitutional: Negative for chills, diaphoresis, fever and malaise/fatigue.   HENT: Positive for congestion and sinus pain. Negative for ear discharge, ear pain, hearing loss, nosebleeds, sore throat and tinnitus.    Eyes: Negative for blurred vision, double vision, photophobia and pain.   Respiratory: Positive for cough, sputum production, shortness of breath and wheezing. Negative for hemoptysis and stridor.    Cardiovascular: Positive for palpitations, orthopnea, leg  swelling and PND. Negative for chest pain and claudication.   Gastrointestinal: Negative for abdominal pain, blood in stool, constipation, diarrhea, heartburn, melena, nausea and vomiting.   Genitourinary: Negative for dysuria, flank pain, frequency, hematuria and urgency.   Musculoskeletal: Negative for back pain, falls, joint pain, myalgias and neck pain.   Skin: Negative for itching and rash.   Neurological: Positive for weakness. Negative for dizziness, tingling, tremors and headaches.   Endo/Heme/Allergies: Negative for environmental allergies and polydipsia. Does not bruise/bleed easily.           Physical Exam  Temp:  [36.2 °C (97.1 °F)-36.9 °C (98.5 °F)] 36.2 °C (97.2 °F)  Pulse:  [60-89] 80  Resp:  [17-19] 18  BP: (123-164)/(53-85) 164/67  SpO2:  [92 %-99 %] 98 %    Physical Exam   Constitutional: She is oriented to person, place, and time. She appears well-developed. No distress.   Obese. Body mass index is 37.13 kg/m².   HENT:   Head: Normocephalic and atraumatic.   Mouth/Throat: Oropharynx is clear and moist. No oropharyngeal exudate.   Eyes: Pupils are equal, round, and reactive to light. Conjunctivae are normal. No scleral icterus.   Neck: Normal range of motion. Neck supple.   Cardiovascular: Normal rate and regular rhythm.  Exam reveals no gallop and no friction rub.    No murmur heard.  Pulmonary/Chest: Effort normal. No respiratory distress. She has no rales. She exhibits no tenderness.   Improved air entry on both lung fields, but still tight, with expiratory wheezing.   Abdominal: Soft. Bowel sounds are normal. She exhibits no distension. There is no tenderness. There is no rebound and no guarding.   Musculoskeletal: She exhibits edema ( Trace lower extremity edema bilaterally, chronic). She exhibits no tenderness.   Lymphadenopathy:     She has no cervical adenopathy.   Neurological: She is alert and oriented to person, place, and time. No cranial nerve deficit. Coordination normal.   Skin: Skin  is warm and dry. No rash noted. She is not diaphoretic. No erythema. No pallor.   Nursing note and vitals reviewed.         Fluids    Intake/Output Summary (Last 24 hours) at 04/16/19 1930  Last data filed at 04/16/19 1800   Gross per 24 hour   Intake              600 ml   Output             2000 ml   Net            -1400 ml       Laboratory  Recent Labs      04/14/19   0653   WBC  7.0   RBC  3.11*   HEMOGLOBIN  9.1*   HEMATOCRIT  30.2*   MCV  97.1   MCH  29.3   MCHC  30.1*   RDW  50.4*   PLATELETCT  163*   MPV  9.1     Recent Labs      04/14/19   0653   SODIUM  134*   POTASSIUM  4.6   CHLORIDE  103   CO2  24   GLUCOSE  185*   BUN  24*   CREATININE  1.53*   CALCIUM  8.6         Recent Labs      04/14/19   0653   BNPBTYPENAT  77           Imaging  DX-CHEST-PORTABLE (1 VIEW)   Final Result      Stable enlargement of the cardiomediastinal silhouette without acute cardiopulmonary disease evident.           Assessment/Plan  * Acute exacerbation of chronic obstructive pulmonary disease (COPD) (HCC)- (present on admission)   Assessment & Plan    -Better, but still wheezy, slightly tight.  -Maintain on IV Solu-Medrol 40 mg IV every 6 hours.  Continue to monitor for clinical improvement, and wean steroids.  Continue bronchodilator.  Resume home dose Symbicort.  -Continue PRN bronchodilators per RT. continue oxygen supplements, wean as able back to her baseline chronic home oxygen.  -Continue oral doxycycline. pro calcitonin +- No infiltrate, fevers may be possible due to renal failure we will hold off on additional antibiotics for now     Acute on chronic respiratory failure with hypoxia (HCC)- (present on admission)   Assessment & Plan    Patient takes 3 L and is currently on 4 L of oxygen here in the hospital.  Likely secondary to COPD exacerbation  Continue to wean off as necessary  RT protocol  History of hypercapnia will avoid all sedative medications     Pulmonary HTN (HCC)   Assessment & Plan    Likely secondary to  obesity, COPD, sleep apnea  Last RSVP was 60  Monitor volume status continue Lasix 20 mg     Exposure to hepatitis C- (present on admission)   Assessment & Plan    Hepatitis C positive     Obesity (BMI 30-39.9)- (present on admission)   Assessment & Plan    - Body mass index is 37.13 kg/m²..  - outpatient referral for outpatient weight management.     Type 2 diabetes mellitus with hyperglycemia, and peripheral neuropathy, with long-term current use of insulin (HCC)- (present on admission)   Assessment & Plan    -Most recent hemoglobin A1c in January was 6.0.  -Continue home Lantus, and insulin sliding scale coverage while in-house.  Accu-Chek's before meals and at bedtime.  -Goal to keep BG between 140-180 per 2019 ADA guidelines.     Tobacco dependence- (present on admission)   Assessment & Plan    - Priya Callahan presents with COPD exacerbation.  The patient continues to smoke 4 cigarettes per day.  She has tried to quit before. I spent 5 minutes counseling the patient on cessation techniques and resources were offered including nicotine patches, gum, along with medical treatment with wellbutrin and chantix. The patient understands continuing to smoke could lead to complications such as lung disease, heart attack, stroke and death.  The benefits of stopping were also presented to her. The patient verbalized that she is ready to quit. She has set goals of 7 days to be completely smoke free.. The patient will follow up again in 2 weeks with PCP to check progress. CPT CODE: 81256 (3-10 minutes tobacco counseling).  - Will start on nicotine replacement therapy while in-house.     Chronic use of opiate drugs therapeutic purposes- (present on admission)   Assessment & Plan    -Continue oxycodone as needed.     Hypothyroidism- (present on admission)   Assessment & Plan    -Recent TSH is normal. Continue Synthroid.     Chronic kidney disease, stage 3 (HCC)- (present on admission)   Assessment & Plan    -Appears  stable.  - avoid nephrotoxins, and continue to renally dose all medications.     Major depression in full remission (HCC)- (present on admission)   Assessment & Plan    -Continue Cymbalta, trazodone, and Elavil.          VTE prophylaxis: heparin SQ

## 2019-04-17 NOTE — FLOWSHEET NOTE
Respiratory Therapy Update    Interdisciplinary Plan of Care-Goals (Indications)  Bronchodilator Indications: History / Diagnosis;Physical Exam / Hyperinflation / Wheezing (bronchospasm);Strong Subjective / Objective Improvement (04/17/19 1604)  Interdisciplinary Plan of Care-Outcomes   Bronchodilator Outcome: Diminished Wheezing and Volume of Air Movement Increased;Patient at Stable Baseline (04/17/19 1604)          #SVN Performed: Yes (04/17/19 1604)    Cough: Congested;Non Productive;Strong (04/17/19 1604)  Sputum Amount: Unable to Evaluate (04/17/19 1604)  Sputum Color: Unable to Evaluate (04/17/19 1604)  Sputum Consistency: Unable to Evaluate (04/17/19 0830)                  O2 (LPM): 4 (04/17/19 1604)  O2 Daily Delivery Respiratory : Silicone Nasal Cannula (04/17/19 1604)    Breath Sounds  Pre/Post Intervention: Pre Intervention Assessment (04/17/19 1604)  RUL Breath Sounds: Crackles;Expiratory Wheezes (04/17/19 1604)  RML Breath Sounds: Crackles;Expiratory Wheezes (04/17/19 1604)  RLL Breath Sounds: Diminished;Expiratory Wheezes (04/17/19 1604)  BIRD Breath Sounds: Crackles;Expiratory Wheezes (04/17/19 1604)  LLL Breath Sounds: Diminished;Expiratory Wheezes (04/17/19 1604)        Events/Summary/Plan: SVN done via m/p w/o distress at this time. (04/17/19 1604)

## 2019-04-17 NOTE — ASSESSMENT & PLAN NOTE
Likely secondary to obesity, COPD, sleep apnea  Last RSVP was 60  Monitor volume status continue Lasix 20 mg

## 2019-04-18 PROBLEM — E87.5 HYPERKALEMIA: Status: ACTIVE | Noted: 2019-04-18

## 2019-04-18 LAB
ALBUMIN SERPL BCP-MCNC: 3.6 G/DL (ref 3.2–4.9)
ALBUMIN/GLOB SERPL: 1.5 G/DL
ALP SERPL-CCNC: 39 U/L (ref 30–99)
ALT SERPL-CCNC: 61 U/L (ref 2–50)
ANION GAP SERPL CALC-SCNC: 5 MMOL/L (ref 0–11.9)
ANION GAP SERPL CALC-SCNC: 8 MMOL/L (ref 0–11.9)
ANION GAP SERPL CALC-SCNC: 9 MMOL/L (ref 0–11.9)
AST SERPL-CCNC: 38 U/L (ref 12–45)
BASOPHILS # BLD AUTO: 0.2 % (ref 0–1.8)
BASOPHILS # BLD: 0.03 K/UL (ref 0–0.12)
BILIRUB SERPL-MCNC: 0.3 MG/DL (ref 0.1–1.5)
BUN SERPL-MCNC: 65 MG/DL (ref 8–22)
BUN SERPL-MCNC: 69 MG/DL (ref 8–22)
BUN SERPL-MCNC: 71 MG/DL (ref 8–22)
CALCIUM SERPL-MCNC: 8.5 MG/DL (ref 8.5–10.5)
CALCIUM SERPL-MCNC: 8.7 MG/DL (ref 8.5–10.5)
CALCIUM SERPL-MCNC: 9.2 MG/DL (ref 8.5–10.5)
CHLORIDE SERPL-SCNC: 101 MMOL/L (ref 96–112)
CHLORIDE SERPL-SCNC: 102 MMOL/L (ref 96–112)
CHLORIDE SERPL-SCNC: 104 MMOL/L (ref 96–112)
CO2 SERPL-SCNC: 23 MMOL/L (ref 20–33)
CO2 SERPL-SCNC: 24 MMOL/L (ref 20–33)
CO2 SERPL-SCNC: 26 MMOL/L (ref 20–33)
CREAT SERPL-MCNC: 1.78 MG/DL (ref 0.5–1.4)
CREAT SERPL-MCNC: 1.81 MG/DL (ref 0.5–1.4)
CREAT SERPL-MCNC: 1.85 MG/DL (ref 0.5–1.4)
EOSINOPHIL # BLD AUTO: 0 K/UL (ref 0–0.51)
EOSINOPHIL NFR BLD: 0 % (ref 0–6.9)
ERYTHROCYTE [DISTWIDTH] IN BLOOD BY AUTOMATED COUNT: 48.7 FL (ref 35.9–50)
GLOBULIN SER CALC-MCNC: 2.4 G/DL (ref 1.9–3.5)
GLUCOSE BLD-MCNC: 148 MG/DL (ref 65–99)
GLUCOSE BLD-MCNC: 301 MG/DL (ref 65–99)
GLUCOSE SERPL-MCNC: 143 MG/DL (ref 65–99)
GLUCOSE SERPL-MCNC: 206 MG/DL (ref 65–99)
GLUCOSE SERPL-MCNC: 243 MG/DL (ref 65–99)
HCT VFR BLD AUTO: 30.7 % (ref 37–47)
HGB BLD-MCNC: 9.5 G/DL (ref 12–16)
IMM GRANULOCYTES # BLD AUTO: 0.59 K/UL (ref 0–0.11)
IMM GRANULOCYTES NFR BLD AUTO: 4.7 % (ref 0–0.9)
LYMPHOCYTES # BLD AUTO: 1.03 K/UL (ref 1–4.8)
LYMPHOCYTES NFR BLD: 8.1 % (ref 22–41)
MCH RBC QN AUTO: 29.4 PG (ref 27–33)
MCHC RBC AUTO-ENTMCNC: 30.9 G/DL (ref 33.6–35)
MCV RBC AUTO: 95 FL (ref 81.4–97.8)
MONOCYTES # BLD AUTO: 0.51 K/UL (ref 0–0.85)
MONOCYTES NFR BLD AUTO: 4 % (ref 0–13.4)
NEUTROPHILS # BLD AUTO: 10.48 K/UL (ref 2–7.15)
NEUTROPHILS NFR BLD: 83 % (ref 44–72)
NRBC # BLD AUTO: 0 K/UL
NRBC BLD-RTO: 0 /100 WBC
PLATELET # BLD AUTO: 167 K/UL (ref 164–446)
PMV BLD AUTO: 9 FL (ref 9–12.9)
POTASSIUM SERPL-SCNC: 5.2 MMOL/L (ref 3.6–5.5)
POTASSIUM SERPL-SCNC: 5.5 MMOL/L (ref 3.6–5.5)
POTASSIUM SERPL-SCNC: 5.8 MMOL/L (ref 3.6–5.5)
PROT SERPL-MCNC: 6 G/DL (ref 6–8.2)
RBC # BLD AUTO: 3.23 M/UL (ref 4.2–5.4)
SODIUM SERPL-SCNC: 133 MMOL/L (ref 135–145)
SODIUM SERPL-SCNC: 134 MMOL/L (ref 135–145)
SODIUM SERPL-SCNC: 135 MMOL/L (ref 135–145)
WBC # BLD AUTO: 12.6 K/UL (ref 4.8–10.8)

## 2019-04-18 PROCEDURE — 80053 COMPREHEN METABOLIC PANEL: CPT

## 2019-04-18 PROCEDURE — 700102 HCHG RX REV CODE 250 W/ 637 OVERRIDE(OP): Performed by: HOSPITALIST

## 2019-04-18 PROCEDURE — A9270 NON-COVERED ITEM OR SERVICE: HCPCS | Performed by: HOSPITALIST

## 2019-04-18 PROCEDURE — 700105 HCHG RX REV CODE 258: Performed by: HOSPITALIST

## 2019-04-18 PROCEDURE — 700111 HCHG RX REV CODE 636 W/ 250 OVERRIDE (IP): Performed by: INTERNAL MEDICINE

## 2019-04-18 PROCEDURE — 82962 GLUCOSE BLOOD TEST: CPT | Mod: 91

## 2019-04-18 PROCEDURE — 85025 COMPLETE CBC W/AUTO DIFF WBC: CPT

## 2019-04-18 PROCEDURE — 700111 HCHG RX REV CODE 636 W/ 250 OVERRIDE (IP): Performed by: HOSPITALIST

## 2019-04-18 PROCEDURE — 700101 HCHG RX REV CODE 250: Performed by: HOSPITALIST

## 2019-04-18 PROCEDURE — 36415 COLL VENOUS BLD VENIPUNCTURE: CPT

## 2019-04-18 PROCEDURE — A9270 NON-COVERED ITEM OR SERVICE: HCPCS | Performed by: INTERNAL MEDICINE

## 2019-04-18 PROCEDURE — 700102 HCHG RX REV CODE 250 W/ 637 OVERRIDE(OP): Performed by: INTERNAL MEDICINE

## 2019-04-18 PROCEDURE — 770020 HCHG ROOM/CARE - TELE (206)

## 2019-04-18 PROCEDURE — 700105 HCHG RX REV CODE 258

## 2019-04-18 PROCEDURE — 99233 SBSQ HOSP IP/OBS HIGH 50: CPT | Performed by: HOSPITALIST

## 2019-04-18 PROCEDURE — 94640 AIRWAY INHALATION TREATMENT: CPT

## 2019-04-18 PROCEDURE — 97162 PT EVAL MOD COMPLEX 30 MIN: CPT

## 2019-04-18 PROCEDURE — 80048 BASIC METABOLIC PNL TOTAL CA: CPT

## 2019-04-18 RX ORDER — FEXOFENADINE HCL 60 MG/1
60 TABLET, FILM COATED ORAL EVERY 12 HOURS
Status: DISCONTINUED | OUTPATIENT
Start: 2019-04-18 | End: 2019-04-19 | Stop reason: HOSPADM

## 2019-04-18 RX ORDER — SODIUM POLYSTYRENE SULFONATE 15 G/60ML
15 SUSPENSION ORAL; RECTAL ONCE
Status: COMPLETED | OUTPATIENT
Start: 2019-04-18 | End: 2019-04-18

## 2019-04-18 RX ORDER — SODIUM CHLORIDE 9 MG/ML
INJECTION, SOLUTION INTRAVENOUS
Status: COMPLETED
Start: 2019-04-18 | End: 2019-04-18

## 2019-04-18 RX ORDER — GUAIFENESIN 600 MG/1
600 TABLET, EXTENDED RELEASE ORAL EVERY 12 HOURS
Status: DISCONTINUED | OUTPATIENT
Start: 2019-04-18 | End: 2019-04-19 | Stop reason: HOSPADM

## 2019-04-18 RX ADMIN — IPRATROPIUM BROMIDE AND ALBUTEROL SULFATE 3 ML: .5; 3 SOLUTION RESPIRATORY (INHALATION) at 18:55

## 2019-04-18 RX ADMIN — CYCLOBENZAPRINE HYDROCHLORIDE 5 MG: 10 TABLET, FILM COATED ORAL at 01:28

## 2019-04-18 RX ADMIN — INSULIN HUMAN 3 UNITS: 100 INJECTION, SOLUTION PARENTERAL at 17:45

## 2019-04-18 RX ADMIN — FUROSEMIDE 20 MG: 20 TABLET ORAL at 06:30

## 2019-04-18 RX ADMIN — HEPARIN SODIUM 5000 UNITS: 5000 INJECTION, SOLUTION INTRAVENOUS; SUBCUTANEOUS at 14:10

## 2019-04-18 RX ADMIN — NICOTINE 14 MG: 14 PATCH, EXTENDED RELEASE TRANSDERMAL at 06:32

## 2019-04-18 RX ADMIN — DOXYCYCLINE 100 MG: 100 TABLET, FILM COATED ORAL at 17:44

## 2019-04-18 RX ADMIN — INSULIN HUMAN 10 UNITS: 100 INJECTION, SOLUTION PARENTERAL at 11:01

## 2019-04-18 RX ADMIN — SODIUM POLYSTYRENE SULFONATE 15 G: 15 SUSPENSION ORAL; RECTAL at 18:43

## 2019-04-18 RX ADMIN — SODIUM CHLORIDE: 9 INJECTION, SOLUTION INTRAVENOUS at 08:30

## 2019-04-18 RX ADMIN — IPRATROPIUM BROMIDE AND ALBUTEROL SULFATE 3 ML: .5; 3 SOLUTION RESPIRATORY (INHALATION) at 15:02

## 2019-04-18 RX ADMIN — INSULIN GLARGINE 25 UNITS: 100 INJECTION, SOLUTION SUBCUTANEOUS at 17:45

## 2019-04-18 RX ADMIN — GUAIFENESIN 600 MG: 600 TABLET, EXTENDED RELEASE ORAL at 17:44

## 2019-04-18 RX ADMIN — FEXOFENADINE HCL 60 MG: 60 TABLET, FILM COATED ORAL at 17:44

## 2019-04-18 RX ADMIN — INSULIN HUMAN 4 UNITS: 100 INJECTION, SOLUTION PARENTERAL at 21:16

## 2019-04-18 RX ADMIN — IPRATROPIUM BROMIDE AND ALBUTEROL SULFATE 3 ML: .5; 3 SOLUTION RESPIRATORY (INHALATION) at 03:31

## 2019-04-18 RX ADMIN — METHYLPREDNISOLONE SODIUM SUCCINATE 40 MG: 40 INJECTION, POWDER, FOR SOLUTION INTRAMUSCULAR; INTRAVENOUS at 17:45

## 2019-04-18 RX ADMIN — OXYCODONE HYDROCHLORIDE 10 MG: 10 TABLET ORAL at 06:30

## 2019-04-18 RX ADMIN — DOXYCYCLINE 100 MG: 100 TABLET, FILM COATED ORAL at 06:30

## 2019-04-18 RX ADMIN — AMITRIPTYLINE HYDROCHLORIDE 50 MG: 25 TABLET, FILM COATED ORAL at 21:11

## 2019-04-18 RX ADMIN — ROSUVASTATIN CALCIUM 10 MG: 10 TABLET, FILM COATED ORAL at 17:44

## 2019-04-18 RX ADMIN — BUDESONIDE AND FORMOTEROL FUMARATE DIHYDRATE 2 PUFF: 160; 4.5 AEROSOL RESPIRATORY (INHALATION) at 06:29

## 2019-04-18 RX ADMIN — LEVOTHYROXINE SODIUM 75 MCG: 75 TABLET ORAL at 06:32

## 2019-04-18 RX ADMIN — OXYCODONE HYDROCHLORIDE 10 MG: 10 TABLET ORAL at 21:11

## 2019-04-18 RX ADMIN — METHYLPREDNISOLONE SODIUM SUCCINATE 40 MG: 40 INJECTION, POWDER, FOR SOLUTION INTRAMUSCULAR; INTRAVENOUS at 06:31

## 2019-04-18 RX ADMIN — HYDRALAZINE HYDROCHLORIDE 10 MG: 20 INJECTION INTRAMUSCULAR; INTRAVENOUS at 22:43

## 2019-04-18 RX ADMIN — SENNOSIDES, DOCUSATE SODIUM 2 TABLET: 50; 8.6 TABLET, FILM COATED ORAL at 06:31

## 2019-04-18 RX ADMIN — HYDRALAZINE HYDROCHLORIDE 10 MG: 20 INJECTION INTRAMUSCULAR; INTRAVENOUS at 01:25

## 2019-04-18 RX ADMIN — POTASSIUM CHLORIDE 20 MEQ: 1500 TABLET, EXTENDED RELEASE ORAL at 06:30

## 2019-04-18 RX ADMIN — CALCIUM GLUCONATE 1 G: 98 INJECTION, SOLUTION INTRAVENOUS at 08:24

## 2019-04-18 RX ADMIN — IPRATROPIUM BROMIDE AND ALBUTEROL SULFATE 3 ML: .5; 3 SOLUTION RESPIRATORY (INHALATION) at 07:07

## 2019-04-18 RX ADMIN — BUDESONIDE AND FORMOTEROL FUMARATE DIHYDRATE 2 PUFF: 160; 4.5 AEROSOL RESPIRATORY (INHALATION) at 17:43

## 2019-04-18 RX ADMIN — OXYCODONE HYDROCHLORIDE 10 MG: 10 TABLET ORAL at 14:10

## 2019-04-18 RX ADMIN — DULOXETINE HYDROCHLORIDE 60 MG: 60 CAPSULE, DELAYED RELEASE ORAL at 06:40

## 2019-04-18 RX ADMIN — AMLODIPINE BESYLATE 10 MG: 10 TABLET ORAL at 06:30

## 2019-04-18 RX ADMIN — TRAZODONE HYDROCHLORIDE 150 MG: 50 TABLET ORAL at 21:11

## 2019-04-18 ASSESSMENT — COGNITIVE AND FUNCTIONAL STATUS - GENERAL
SUGGESTED CMS G CODE MODIFIER MOBILITY: CJ
CLIMB 3 TO 5 STEPS WITH RAILING: A LITTLE
TURNING FROM BACK TO SIDE WHILE IN FLAT BAD: A LITTLE
MOBILITY SCORE: 22

## 2019-04-18 ASSESSMENT — ENCOUNTER SYMPTOMS
FALLS: 0
HEMOPTYSIS: 0
DOUBLE VISION: 0
TREMORS: 0
BLOOD IN STOOL: 0
MYALGIAS: 0
DIARRHEA: 0
DIAPHORESIS: 0
NECK PAIN: 0
VOMITING: 0
TINGLING: 0
CLAUDICATION: 0
DIZZINESS: 0
HEARTBURN: 0
WHEEZING: 1
SORE THROAT: 0
PHOTOPHOBIA: 0
SINUS PAIN: 1
EYE PAIN: 0
POLYDIPSIA: 0
BLURRED VISION: 0
FEVER: 0
ORTHOPNEA: 1
BRUISES/BLEEDS EASILY: 0
CHILLS: 0
PND: 1
SHORTNESS OF BREATH: 1
WEAKNESS: 1
ABDOMINAL PAIN: 0
FLANK PAIN: 0
BACK PAIN: 0
PALPITATIONS: 1
CONSTIPATION: 0
NAUSEA: 0
STRIDOR: 0
SPUTUM PRODUCTION: 1
COUGH: 1
HEADACHES: 0

## 2019-04-18 ASSESSMENT — GAIT ASSESSMENTS
ASSISTIVE DEVICE: FRONT WHEEL WALKER
DISTANCE (FEET): 45
GAIT LEVEL OF ASSIST: SUPERVISED

## 2019-04-18 NOTE — CARE PLAN
Problem: Oxygenation:  Goal: Maintain adequate oxygenation dependent on patient condition    Intervention: Levels of oxygenation will improve to baseline  4L NC 96%      Problem: Bronchoconstriction:  Goal: Improve in air movement and diminished wheezing    Intervention: Implement inhaled treatments  Duo Q6  Sym BID

## 2019-04-18 NOTE — CARE PLAN
Problem: Communication  Goal: The ability to communicate needs accurately and effectively will improve  Outcome: PROGRESSING AS EXPECTED    Intervention: [place] patient and significant other/support system to call light to alert staff of needs  Patient oriented to call light system and has been able to communicate needs accurately and effectively.      Problem: Safety  Goal: Will remain free from falls  Outcome: PROGRESSING AS EXPECTED    Intervention: Assess risk factors for falls  Patient has been assessed for fall risks and fall precautions have been put in place.

## 2019-04-18 NOTE — CARE PLAN
Problem: Psychosocial Needs:  Goal: Level of anxiety will decrease  Outcome: PROGRESSING SLOWER THAN EXPECTED  Pt states she is trying to move into assisted living and sell her house and this is causing her a lot of anxiety, per pt. Will contact .

## 2019-04-18 NOTE — THERAPY
"Physical Therapy Evaluation completed.   Bed Mobility:  Supine to Sit: Supervised  Transfers: Sit to Stand: Supervised  Gait: Level Of Assist: Supervised with Front-Wheel Walker       Plan of Care: Patient with no further skilled PT needs in the acute care setting at this time  Discharge Recommendations: Equipment: No Equipment Needed. Post-acute therapy Discharge to home with outpatient or home health for additional skilled therapy services.    Today, she is able to move in/out of bed. She is able to ambulate in the hallway. While limited to less than 50 ft, this is all she needs to ambulate when at home.. She is a limited household ambulator at best PTA. Pt feels one more day of getting out of bed and walking and she will be ready for d/c tomorrow. Spoke w/ nsg regarding ambulating pt today, night shift etc,. No PT needs.    See \"Rehab Therapy-Acute\" Patient Summary Report for complete documentation.     "

## 2019-04-18 NOTE — PROGRESS NOTES
Pt A&Ox4. K+ 6, peaked T waves, BMP drawn, calcium gluconate given. K+ result 5.5. Provider aware. Pt stable.

## 2019-04-19 ENCOUNTER — PATIENT OUTREACH (OUTPATIENT)
Dept: HEALTH INFORMATION MANAGEMENT | Facility: OTHER | Age: 69
End: 2019-04-19

## 2019-04-19 VITALS
WEIGHT: 227.29 LBS | SYSTOLIC BLOOD PRESSURE: 149 MMHG | HEART RATE: 81 BPM | RESPIRATION RATE: 18 BRPM | DIASTOLIC BLOOD PRESSURE: 75 MMHG | BODY MASS INDEX: 37.87 KG/M2 | HEIGHT: 65 IN | TEMPERATURE: 97.4 F | OXYGEN SATURATION: 97 %

## 2019-04-19 LAB
ALBUMIN SERPL BCP-MCNC: 3.5 G/DL (ref 3.2–4.9)
ALBUMIN/GLOB SERPL: 1.7 G/DL
ALP SERPL-CCNC: 52 U/L (ref 30–99)
ALT SERPL-CCNC: 107 U/L (ref 2–50)
ANION GAP SERPL CALC-SCNC: 7 MMOL/L (ref 0–11.9)
ANISOCYTOSIS BLD QL SMEAR: ABNORMAL
AST SERPL-CCNC: 81 U/L (ref 12–45)
BACTERIA BLD CULT: NORMAL
BACTERIA BLD CULT: NORMAL
BASOPHILS # BLD AUTO: 0.9 % (ref 0–1.8)
BASOPHILS # BLD: 0.11 K/UL (ref 0–0.12)
BILIRUB SERPL-MCNC: 0.3 MG/DL (ref 0.1–1.5)
BUN SERPL-MCNC: 71 MG/DL (ref 8–22)
CALCIUM SERPL-MCNC: 8.5 MG/DL (ref 8.5–10.5)
CHLORIDE SERPL-SCNC: 103 MMOL/L (ref 96–112)
CO2 SERPL-SCNC: 26 MMOL/L (ref 20–33)
CREAT SERPL-MCNC: 1.7 MG/DL (ref 0.5–1.4)
EOSINOPHIL # BLD AUTO: 0 K/UL (ref 0–0.51)
EOSINOPHIL NFR BLD: 0 % (ref 0–6.9)
ERYTHROCYTE [DISTWIDTH] IN BLOOD BY AUTOMATED COUNT: 49.1 FL (ref 35.9–50)
GLOBULIN SER CALC-MCNC: 2.1 G/DL (ref 1.9–3.5)
GLUCOSE BLD-MCNC: 116 MG/DL (ref 65–99)
GLUCOSE BLD-MCNC: 159 MG/DL (ref 65–99)
GLUCOSE BLD-MCNC: 225 MG/DL (ref 65–99)
GLUCOSE SERPL-MCNC: 218 MG/DL (ref 65–99)
HCT VFR BLD AUTO: 30.7 % (ref 37–47)
HGB BLD-MCNC: 9.4 G/DL (ref 12–16)
LYMPHOCYTES # BLD AUTO: 1.1 K/UL (ref 1–4.8)
LYMPHOCYTES NFR BLD: 8.8 % (ref 22–41)
MACROCYTES BLD QL SMEAR: ABNORMAL
MANUAL DIFF BLD: NORMAL
MCH RBC QN AUTO: 29.2 PG (ref 27–33)
MCHC RBC AUTO-ENTMCNC: 30.6 G/DL (ref 33.6–35)
MCV RBC AUTO: 95.3 FL (ref 81.4–97.8)
METAMYELOCYTES NFR BLD MANUAL: 3.5 %
MONOCYTES # BLD AUTO: 0.33 K/UL (ref 0–0.85)
MONOCYTES NFR BLD AUTO: 2.6 % (ref 0–13.4)
MORPHOLOGY BLD-IMP: NORMAL
MYELOCYTES NFR BLD MANUAL: 2.6 %
NEUTROPHILS # BLD AUTO: 10.2 K/UL (ref 2–7.15)
NEUTROPHILS NFR BLD: 81.6 % (ref 44–72)
NRBC # BLD AUTO: 0.05 K/UL
NRBC BLD-RTO: 0.4 /100 WBC
PLATELET # BLD AUTO: 198 K/UL (ref 164–446)
PLATELET BLD QL SMEAR: NORMAL
PMV BLD AUTO: 9.2 FL (ref 9–12.9)
POLYCHROMASIA BLD QL SMEAR: NORMAL
POTASSIUM SERPL-SCNC: 5.1 MMOL/L (ref 3.6–5.5)
PROT SERPL-MCNC: 5.6 G/DL (ref 6–8.2)
RBC # BLD AUTO: 3.22 M/UL (ref 4.2–5.4)
RBC BLD AUTO: PRESENT
SIGNIFICANT IND 70042: NORMAL
SIGNIFICANT IND 70042: NORMAL
SITE SITE: NORMAL
SITE SITE: NORMAL
SMUDGE CELLS BLD QL SMEAR: NORMAL
SODIUM SERPL-SCNC: 136 MMOL/L (ref 135–145)
SOURCE SOURCE: NORMAL
SOURCE SOURCE: NORMAL
WBC # BLD AUTO: 12.5 K/UL (ref 4.8–10.8)

## 2019-04-19 PROCEDURE — 700102 HCHG RX REV CODE 250 W/ 637 OVERRIDE(OP): Performed by: INTERNAL MEDICINE

## 2019-04-19 PROCEDURE — 80053 COMPREHEN METABOLIC PANEL: CPT

## 2019-04-19 PROCEDURE — A9270 NON-COVERED ITEM OR SERVICE: HCPCS | Performed by: HOSPITALIST

## 2019-04-19 PROCEDURE — 85027 COMPLETE CBC AUTOMATED: CPT

## 2019-04-19 PROCEDURE — A9270 NON-COVERED ITEM OR SERVICE: HCPCS | Performed by: INTERNAL MEDICINE

## 2019-04-19 PROCEDURE — 700102 HCHG RX REV CODE 250 W/ 637 OVERRIDE(OP): Performed by: HOSPITALIST

## 2019-04-19 PROCEDURE — 700111 HCHG RX REV CODE 636 W/ 250 OVERRIDE (IP): Performed by: HOSPITALIST

## 2019-04-19 PROCEDURE — 82962 GLUCOSE BLOOD TEST: CPT

## 2019-04-19 PROCEDURE — 85007 BL SMEAR W/DIFF WBC COUNT: CPT

## 2019-04-19 PROCEDURE — 99239 HOSP IP/OBS DSCHRG MGMT >30: CPT | Performed by: HOSPITALIST

## 2019-04-19 PROCEDURE — 36415 COLL VENOUS BLD VENIPUNCTURE: CPT

## 2019-04-19 RX ORDER — ALBUTEROL SULFATE 90 UG/1
2 AEROSOL, METERED RESPIRATORY (INHALATION) EVERY 6 HOURS PRN
Qty: 8.5 G | Refills: 3 | Status: SHIPPED | OUTPATIENT
Start: 2019-04-19 | End: 2019-06-03

## 2019-04-19 RX ORDER — GUAIFENESIN 600 MG/1
600 TABLET, EXTENDED RELEASE ORAL EVERY 12 HOURS
Qty: 14 TAB | Refills: 0 | Status: SHIPPED | OUTPATIENT
Start: 2019-04-19 | End: 2019-04-26

## 2019-04-19 RX ORDER — BUDESONIDE AND FORMOTEROL FUMARATE DIHYDRATE 160; 4.5 UG/1; UG/1
AEROSOL RESPIRATORY (INHALATION)
Qty: 30.6 G | Refills: 0 | Status: SHIPPED | OUTPATIENT
Start: 2019-04-19 | End: 2019-04-25

## 2019-04-19 RX ORDER — PREDNISONE 20 MG/1
TABLET ORAL
Qty: 30 TAB | Refills: 0 | Status: SHIPPED | OUTPATIENT
Start: 2019-04-19 | End: 2019-06-03

## 2019-04-19 RX ORDER — FEXOFENADINE HCL 60 MG/1
60 TABLET, FILM COATED ORAL EVERY 12 HOURS
Qty: 14 TAB | Refills: 0 | Status: SHIPPED | OUTPATIENT
Start: 2019-04-19 | End: 2019-04-26

## 2019-04-19 RX ORDER — INSULIN GLARGINE 100 [IU]/ML
25 INJECTION, SOLUTION SUBCUTANEOUS EVERY EVENING
Qty: 10 ML | Refills: 3 | Status: SHIPPED | OUTPATIENT
Start: 2019-04-19 | End: 2019-06-03

## 2019-04-19 RX ORDER — FUROSEMIDE 20 MG/1
20 TABLET ORAL DAILY
Qty: 60 TAB | Refills: 0 | Status: ON HOLD | OUTPATIENT
Start: 2019-04-20 | End: 2019-06-07

## 2019-04-19 RX ADMIN — BUDESONIDE AND FORMOTEROL FUMARATE DIHYDRATE 2 PUFF: 160; 4.5 AEROSOL RESPIRATORY (INHALATION) at 06:22

## 2019-04-19 RX ADMIN — LEVOTHYROXINE SODIUM 75 MCG: 75 TABLET ORAL at 06:25

## 2019-04-19 RX ADMIN — GUAIFENESIN 600 MG: 600 TABLET, EXTENDED RELEASE ORAL at 06:24

## 2019-04-19 RX ADMIN — FEXOFENADINE HCL 60 MG: 60 TABLET, FILM COATED ORAL at 06:25

## 2019-04-19 RX ADMIN — NICOTINE 14 MG: 14 PATCH, EXTENDED RELEASE TRANSDERMAL at 06:24

## 2019-04-19 RX ADMIN — HYDRALAZINE HYDROCHLORIDE 10 MG: 20 INJECTION INTRAMUSCULAR; INTRAVENOUS at 00:02

## 2019-04-19 RX ADMIN — METHYLPREDNISOLONE SODIUM SUCCINATE 40 MG: 40 INJECTION, POWDER, FOR SOLUTION INTRAMUSCULAR; INTRAVENOUS at 06:24

## 2019-04-19 RX ADMIN — SENNOSIDES, DOCUSATE SODIUM 2 TABLET: 50; 8.6 TABLET, FILM COATED ORAL at 06:24

## 2019-04-19 RX ADMIN — FUROSEMIDE 20 MG: 20 TABLET ORAL at 06:25

## 2019-04-19 RX ADMIN — AMLODIPINE BESYLATE 10 MG: 10 TABLET ORAL at 06:25

## 2019-04-19 RX ADMIN — DULOXETINE HYDROCHLORIDE 60 MG: 60 CAPSULE, DELAYED RELEASE ORAL at 06:24

## 2019-04-19 RX ADMIN — CYCLOBENZAPRINE HYDROCHLORIDE 5 MG: 10 TABLET, FILM COATED ORAL at 01:42

## 2019-04-19 ASSESSMENT — PATIENT HEALTH QUESTIONNAIRE - PHQ9
2. FEELING DOWN, DEPRESSED, IRRITABLE, OR HOPELESS: NOT AT ALL
SUM OF ALL RESPONSES TO PHQ9 QUESTIONS 1 AND 2: 0
1. LITTLE INTEREST OR PLEASURE IN DOING THINGS: NOT AT ALL

## 2019-04-19 NOTE — DISCHARGE PLANNING
SW had requested HHC orders per pt request.  SW received HHC orders, however, pt did not wait for HHC to be ordered before leaving hospital.

## 2019-04-19 NOTE — DISCHARGE INSTRUCTIONS
Discharge Instructions    Discharged to home by car with escort. Discharged via wheelchair, hospital escort: Yes.  Special equipment needed: Oxygen    Be sure to schedule a follow-up appointment with your primary care doctor or any specialists as instructed.     Discharge Plan:   Diet Plan: Discussed  Activity Level: Discussed  Smoking Cessation Offered: Patient Refused  Confirmed Follow up Appointment: Appointment Scheduled  Confirmed Symptoms Management: Discussed  Medication Reconciliation Updated: Yes  Influenza Vaccine Indication: Not indicated: Previously immunized this influenza season and > 8 years of age (10/10/2018)    I understand that a diet low in cholesterol, fat, and sodium is recommended for good health. Unless I have been given specific instructions below for another diet, I accept this instruction as my diet prescription.   Other diet: Cardiac, Diabetic    Special Instructions: None    · Is patient discharged on Warfarin / Coumadin?   No     Depression / Suicide Risk    As you are discharged from this [facility] facility, it is important to learn how to keep safe from harming yourself.    Recognize the warning signs:  · Abrupt changes in personality, positive or negative- including increase in energy   · Giving away possessions  · Change in eating patterns- significant weight changes-  positive or negative  · Change in sleeping patterns- unable to sleep or sleeping all the time   · Unwillingness or inability to communicate  · Depression  · Unusual sadness, discouragement and loneliness  · Talk of wanting to die  · Neglect of personal appearance   · Rebelliousness- reckless behavior  · Withdrawal from people/activities they love  · Confusion- inability to concentrate     If you or a loved one observes any of these behaviors or has concerns about self-harm, here's what you can do:  · Talk about it- your feelings and reasons for harming yourself  · Remove any means that you might use to hurt  yourself (examples: pills, rope, extension cords, firearm)  · Get professional help from the community (Mental Health, Substance Abuse, psychological counseling)  · Do not be alone:Call your Safe Contact- someone whom you trust who will be there for you.  · Call your local CRISIS HOTLINE 029-6407 or 944-013-6917  · Call your local Children's Mobile Crisis Response Team [place] (519) 307-0785 or www.Mobento  · Call the toll free National Suicide Prevention Hotlines   · National Suicide Prevention Lifeline 708-845-EUAF (6355)  · [employer] Line Network 800-SUICIDE (708-0870)      Chronic Obstructive Pulmonary Disease Exacerbation  Chronic obstructive pulmonary disease (COPD) is a common lung condition in which airflow from the lungs is limited. COPD is a general term that can be used to describe many different lung problems that limit airflow, including chronic bronchitis and emphysema. COPD exacerbations are episodes when breathing symptoms become much worse and require extra treatment. Without treatment, COPD exacerbations can be life threatening, and frequent COPD exacerbations can cause further damage to your lungs.  What are the causes?  · Respiratory infections.  · Exposure to smoke.  · Exposure to air pollution, chemical fumes, or dust.  Sometimes there is no apparent cause or trigger.  What increases the risk?  · Smoking cigarettes.  · Older age.  · Frequent prior COPD exacerbations.  What are the signs or symptoms?  · Increased coughing.  · Increased thick spit (sputum) production.  · Increased wheezing.  · Increased shortness of breath.  · Rapid breathing.  · Chest tightness.  How is this diagnosed?  Your medical history, a physical exam, and tests will help your health care provider make a diagnosis. Tests may include:  · A chest X-ray.  · Basic lab tests.  · Sputum testing.  · An arterial blood gas test.  How is this treated?  Depending on the severity of your COPD exacerbation, you may  need to be admitted to a hospital for treatment. Some of the treatments commonly used to treat COPD exacerbations are:  · Antibiotic medicines.  · Bronchodilators. These are drugs that expand the air passages. They may be given with an inhaler or nebulizer. Spacer devices may be needed to help improve drug delivery.  · Corticosteroid medicines.  · Supplemental oxygen therapy.  · Airway clearing techniques, such as noninvasive ventilation (NIV) and positive expiratory pressure (PEP). These provide respiratory support through a mask or other noninvasive device.  Follow these instructions at home:  · Do not smoke. Quitting smoking is very important to prevent COPD from getting worse and exacerbations from happening as often.  · Avoid exposure to all substances that irritate the airway, especially to tobacco smoke.  · If you were prescribed an antibiotic medicine, finish it all even if you start to feel better.  · Take all medicines as directed by your health care provider. It is important to use correct technique with inhaled medicines.  · Drink enough fluids to keep your urine clear or pale yellow (unless you have a medical condition that requires fluid restriction).  · Use a cool mist vaporizer. This makes it easier to clear your chest when you cough.  · If you have a home nebulizer and oxygen, continue to use them as directed.  · Maintain all necessary vaccinations to prevent infections.  · Exercise regularly.  · Eat a healthy diet.  · Keep all follow-up appointments as directed by your health care provider.  Get help right away if:  · You have worsening shortness of breath.  · You have trouble talking.  · You have severe chest pain.  · You have blood in your sputum.  · You have a fever.  · You have weakness, vomit repeatedly, or faint.  · You feel confused.  · You continue to get worse.  This information is not intended to replace advice given to you by your health care provider. Make sure you discuss any questions  you have with your health care provider.  Document Released: 10/14/2008 Document Revised: 05/25/2017 Document Reviewed: 08/22/2014  [employer] Interactive Patient Education © 2017 [employer]    Acute Respiratory Failure, Adult  Acute respiratory failure occurs when there is not enough oxygen passing from your lungs to your body. When this happens, your lungs have trouble removing carbon dioxide from the blood. This causes your blood oxygen level to drop too low as carbon dioxide builds up.  Acute respiratory failure is a medical emergency. It can develop quickly, but it is temporary if treated promptly. Your lung capacity, or how much air your lungs can hold, may improve with time, exercise, and treatment.  What are the causes?  There are many possible causes of acute respiratory failure, including:  · Lung injury.  · Chest injury or damage to the ribs or tissues near the lungs.  · Lung conditions that affect the flow of air and blood into and out of the lungs, such as pneumonia, acute respiratory distress syndrome, and cystic fibrosis.  · Medical conditions, such as strokes or spinal cord injuries, that affect the muscles and nerves that control breathing.  · Blood infection (sepsis).  · Inflammation of the pancreas (pancreatitis).  · A blood clot in the lungs (pulmonary embolism).  · A large-volume blood transfusion.  · Burns.  · Near-drowning.  · Seizure.  · Smoke inhalation.  · Reaction to medicines.  · Alcohol or drug overdose.  What increases the risk?  This condition is more likely to develop in people who have:  · A blocked airway.  · Asthma.  · A condition or disease that damages or weakens the muscles, nerves, bones, or tissues that are involved in breathing.  · A serious infection.  · A health problem that blocks the unconscious reflex that is involved in breathing, such as hypothyroidism or sleep apnea.  · A lung injury or trauma.  What are the signs or symptoms?  Trouble breathing is the main symptom  of acute respiratory failure. Symptoms may also include:  · Rapid breathing.  · Restlessness or anxiety.  · Skin, lips, or fingernails that appear blue (cyanosis).  · Rapid heart rate.  · Abnormal heart rhythms (arrhythmias).  · Confusion or changes in behavior.  · Tiredness or loss of energy.  · Feeling sleepy or having a loss of consciousness.  How is this diagnosed?  Your health care provider can diagnose acute respiratory failure with a medical history and physical exam. During the exam, your health care provider will listen to your heart and check for crackling or wheezing sounds in your lungs. Your may also have tests to confirm the diagnosis and determine what is causing respiratory failure. These tests may include:  · Measuring the amount of oxygen in your blood (pulse oximetry). The measurement comes from a small device that is placed on your finger, earlobe, or toe.  · Other blood tests to measure blood gases and to look for signs of infection.  · Sampling your cerebral spinal fluid or tracheal fluid to check for infections.  · Chest X-ray to look for fluid in spaces that should be filled with air.  · Electrocardiogram (ECG) to look at the heart's electrical activity.  How is this treated?  Treatment for this condition usually takes places in a hospital intensive care unit (ICU). Treatment depends on what is causing the condition. It may include one or more treatments until your symptoms improve. Treatment may include:  · Supplemental oxygen. Extra oxygen is given through a tube in the nose, a face mask, or a abdullahi.  · A device such as a continuous positive airway pressure (CPAP) or bi-level positive airway pressure (BiPAP or BPAP) machine. This treatment uses mild air pressure to keep the airways open. A mask or other device will be placed over your nose or mouth. A tube that is connected to a motor will deliver oxygen through the mask.  · Ventilator. This treatment helps move air into and out of the  lungs. This may be done with a bag and mask or a machine. For this treatment, a tube is placed in your windpipe (trachea) so air and oxygen can flow to the lungs.  · Extracorporeal membrane oxygenation (ECMO). This treatment temporarily takes over the function of the heart and lungs, supplying oxygen and removing carbon dioxide. ECMO gives the lungs a chance to recover. It may be used if a ventilator is not effective.  · Tracheostomy. This is a procedure that creates a hole in the neck to insert a breathing tube.  · Receiving fluids and medicines.  · Rocking the bed to help breathing.  Follow these instructions at home:  · Take over-the-counter and prescription medicines only as told by your health care provider.  · Return to normal activities as told by your health care provider. Ask your health care provider what activities are safe for you.  · Keep all follow-up visits as told by your health care provider. This is important.  How is this prevented?  Treating infections and medical conditions that may lead to acute respiratory failure can help prevent the condition from developing.  Contact a health care provider if:  · You have a fever.  · Your symptoms do not improve or they get worse.  Get help right away if:  · You are having trouble breathing.  · You lose consciousness.  · Your have cyanosis or turn blue.  · You develop a rapid heart rate.  · You are confused.  These symptoms may represent a serious problem that is an emergency. Do not wait to see if the symptoms will go away. Get medical help right away. Call your local emergency services (911 in the [place] Do not drive yourself to the hospital.   This information is not intended to replace advice given to you by your health care provider. Make sure you discuss any questions you have with your health care provider.  Document Released: 12/23/2014 Document Revised: 07/15/2017 Document Reviewed: 07/05/2017  [employer] Interactive Patient Education © 2017 [employer]

## 2019-04-19 NOTE — PROGRESS NOTES
Hospital Medicine Daily Progress Note    Date of Service  4/18/2019    Chief Complaint  SOB    Hospital Course    68 y.o. female with multiple medical problems including type 2 diabetes mellitus, obesity, oxygen dependent COPD (3-1/2 L chronically), hypertension, admitted 4/14/2019 with progressively worsening shortness of breath, and need to increase her oxygen to 4 L because of that.  She also had a fever at home, with increased cough productive of whitish phlegm.  She continues to smoke.  Initial labs showed no leukocytosis.  Creatinine was 1.53.  Sodium was 134.  BNP was low.  Chest x-ray showed nothing acute, with stable enlargement of the cardiomediastinal silhouette.  She was rhonchorous and wheezy on exam.  She was felt to have COPD exacerbation, and started on IV steroids, oral doxycycline, and bronchodilators.        Interval Problem Update  4/15/2019 - I reviewed the patient's chart. There were no significant overnight events. Remains hemodynamically stable and afebrile. Stable on 4L O2 NC.    4/16: Patient seen and examined by me today.  She significantly wheezing and I will continue the same dose of IV Solu-Medrol.  She continues to be on 4 L of O2 and will wean off as necessary.  Pro-calcitonin was elevated and this may be due to CKD, will continue to trend and if persistent will start antibiotics.  4/17: Patient found to be persistently wheezing.  We will continue with IV Solu-Medrol for now.  No fever reported.  Blood pressures uncontrolled today. Started pt on lisinopril since she has CKD.  4/18: Found to be hyperkalemic, will continue to trend.  She was treated with calcium gluconate.  Also gave a dose of Kayexalate.  She really will be receiving insulin for her sliding scale and receiving DuoNeb treatments which will also lower her potassium as well.  We will repeat a BMP in 6 hours.  Wheezing is improved and will continue to taper her steroids. Waiting on PT eval.      Consultants/Specialty  none    Code Status  Full    Disposition  Monitor on telemetry    Review of Systems  Review of Systems   Constitutional: Negative for chills, diaphoresis, fever and malaise/fatigue.   HENT: Positive for congestion and sinus pain. Negative for ear discharge, ear pain, hearing loss, nosebleeds, sore throat and tinnitus.    Eyes: Negative for blurred vision, double vision, photophobia and pain.   Respiratory: Positive for cough, sputum production, shortness of breath and wheezing. Negative for hemoptysis and stridor.    Cardiovascular: Positive for palpitations, orthopnea, leg swelling and PND. Negative for chest pain and claudication.   Gastrointestinal: Negative for abdominal pain, blood in stool, constipation, diarrhea, heartburn, melena, nausea and vomiting.   Genitourinary: Negative for dysuria, flank pain, frequency, hematuria and urgency.   Musculoskeletal: Negative for back pain, falls, joint pain, myalgias and neck pain.   Skin: Negative for itching and rash.   Neurological: Positive for weakness. Negative for dizziness, tingling, tremors and headaches.   Endo/Heme/Allergies: Negative for environmental allergies and polydipsia. Does not bruise/bleed easily.           Physical Exam  Temp:  [36.5 °C (97.7 °F)-36.9 °C (98.5 °F)] 36.9 °C (98.5 °F)  Pulse:  [68-78] 75  Resp:  [16-20] 16  BP: (135-156)/(61-73) 136/61  SpO2:  [93 %-96 %] 93 %    Physical Exam   Constitutional: She is oriented to person, place, and time. She appears well-developed. No distress.   Obese. Body mass index is 37.13 kg/m².   HENT:   Head: Normocephalic and atraumatic.   Mouth/Throat: Oropharynx is clear and moist. No oropharyngeal exudate.   Eyes: Pupils are equal, round, and reactive to light. Conjunctivae are normal. No scleral icterus.   Neck: Normal range of motion. Neck supple.   Cardiovascular: Normal rate and regular rhythm.  Exam reveals no gallop and no friction rub.    No murmur heard.  Pulmonary/Chest:  Effort normal. No respiratory distress. She has no rales. She exhibits no tenderness.   Improved air entry on both lung fields, but still tight, with expiratory wheezing.   Abdominal: Soft. Bowel sounds are normal. She exhibits no distension. There is no tenderness. There is no rebound and no guarding.   Musculoskeletal: She exhibits edema ( Trace lower extremity edema bilaterally, chronic). She exhibits no tenderness.   Lymphadenopathy:     She has no cervical adenopathy.   Neurological: She is alert and oriented to person, place, and time. No cranial nerve deficit. Coordination normal.   Skin: Skin is warm and dry. No rash noted. She is not diaphoretic. No erythema. No pallor.   Nursing note and vitals reviewed.         Fluids    Intake/Output Summary (Last 24 hours) at 04/18/19 2597  Last data filed at 04/18/19 0448   Gross per 24 hour   Intake                0 ml   Output              675 ml   Net             -675 ml       Laboratory  Recent Labs      04/17/19   0820  04/18/19   0044   WBC  12.6*  12.6*   RBC  3.37*  3.23*   HEMOGLOBIN  9.7*  9.5*   HEMATOCRIT  31.7*  30.7*   MCV  94.1  95.0   MCH  28.8  29.4   MCHC  30.6*  30.9*   RDW  47.3  48.7   PLATELETCT  181  167   MPV  9.7  9.0     Recent Labs      04/17/19   0818  04/18/19   0820  04/18/19   1550   SODIUM  136  135  133*   POTASSIUM  6.0*  5.5  5.8*   CHLORIDE  103  104  102   CO2  20  23  26   GLUCOSE  199*  143*  206*   BUN  61*  65*  69*   CREATININE  1.85*  1.78*  1.81*   CALCIUM  8.8  8.7  9.2                   Imaging  DX-CHEST-PORTABLE (1 VIEW)   Final Result      Stable enlargement of the cardiomediastinal silhouette without acute cardiopulmonary disease evident.           Assessment/Plan  * Acute exacerbation of chronic obstructive pulmonary disease (COPD) (HCC)- (present on admission)   Assessment & Plan    -Persistently wheezing  -Maintain on IV Solu-Medrol 40 mg IV every 12 hours.  Continue to monitor for clinical improvement, and wean  steroids.  Continue bronchodilator.  Resume home dose Symbicort.  -Continue PRN bronchodilators per RT. continue oxygen supplements, wean as able back to her baseline chronic home oxygen.  -Continue oral doxycycline. pro calcitonin +- No infiltrate, fevers may be possible due to renal failure we will hold off on additional antibiotics for now     Acute on chronic respiratory failure with hypoxia (HCC)- (present on admission)   Assessment & Plan    Patient takes 3 L and is currently on 4 L of oxygen here in the hospital.  Likely secondary to COPD exacerbation  Continue to wean off as necessary  RT protocol  History of hypercapnia will avoid all sedative medications     Hyperkalemia   Assessment & Plan    Found to be elevated dced oral potassium  Gave a dose of calcium  Will trend potassium levels 5.5- 5.8  Will give kayexalate      Pulmonary HTN (HCC)   Assessment & Plan    Likely secondary to obesity, COPD, sleep apnea  Last RSVP was 60  Monitor volume status continue Lasix 20 mg     Exposure to hepatitis C- (present on admission)   Assessment & Plan    Hepatitis C positive     Obesity (BMI 30-39.9)- (present on admission)   Assessment & Plan    - Body mass index is 37.13 kg/m²..  - outpatient referral for outpatient weight management.     Type 2 diabetes mellitus with hyperglycemia, and peripheral neuropathy, with long-term current use of insulin (HCC)- (present on admission)   Assessment & Plan    -Most recent hemoglobin A1c in January was 6.0.  -Continue home Lantus, and insulin sliding scale coverage while in-house.  Accu-Chek's before meals and at bedtime.  -Goal to keep BG between 140-180 per 2019 ADA guidelines.  - Uncontrolled due to steroids increased his lantus dose to 25U     Tobacco dependence- (present on admission)   Assessment & Plan    - Priya Callahan presents with COPD exacerbation.  The patient continues to smoke 4 cigarettes per day.  She has tried to quit before. I spent 5 minutes  counseling the patient on cessation techniques and resources were offered including nicotine patches, gum, along with medical treatment with wellbutrin and chantix. The patient understands continuing to smoke could lead to complications such as lung disease, heart attack, stroke and death.  The benefits of stopping were also presented to her. The patient verbalized that she is ready to quit. She has set goals of 7 days to be completely smoke free.. The patient will follow up again in 2 weeks with PCP to check progress. CPT CODE: 24308 (3-10 minutes tobacco counseling).  - Will start on nicotine replacement therapy while in-house.     Chronic use of opiate drugs therapeutic purposes- (present on admission)   Assessment & Plan    -Continue oxycodone as needed.     Hypothyroidism- (present on admission)   Assessment & Plan    -Recent TSH is normal. Continue Synthroid.     Chronic kidney disease, stage 3 (HCC)- (present on admission)   Assessment & Plan    -Appears stable.  - avoid nephrotoxins, and continue to renally dose all medications.  Start lisinopril      Major depression in full remission (HCC)- (present on admission)   Assessment & Plan    -Continue Cymbalta, trazodone, and Elavil.          VTE prophylaxis: heparin SQ

## 2019-04-19 NOTE — DISCHARGE SUMMARY
Discharge Summary    CHIEF COMPLAINT ON ADMISSION  Chief Complaint   Patient presents with   • Shortness of Breath       Reason for Admission  ems     Admission Date  4/14/2019    CODE STATUS  Full Code    HPI & HOSPITAL COURSE    68 y.o. female with multiple medical problems including type 2 diabetes mellitus, obesity, oxygen dependent COPD (3-1/2 L chronically), hypertension, admitted 4/14/2019 with progressively worsening shortness of breath, and need to increase her oxygen to 4 L because of that.  She also had a fever at home, with increased cough productive of whitish phlegm.  She continues to smoke.  Initial labs showed no leukocytosis.  Creatinine was 1.53.  Sodium was 134.  BNP was low.  Chest x-ray showed nothing acute, with stable enlargement of the cardiomediastinal silhouette.  She was rhonchorous and wheezy on exam.  She was felt to have COPD exacerbation, and started on IV steroids, oral doxycycline, and bronchodilators.   Patient symptoms have improved throughout her hospital stay on steroids and oral doxycycline. Her procalcitonin was elevated but it was thought because of underlying ckd. She reported not symptoms of pneumonia nor the chest x ray have evidence of a pneumonia.  The patient was started on Lasix and oral potassium.  Her potassium had climbed up to 6 and this was treated.  Potassium was discontinued afterwards and stabilized. Lasix was started due to cor pulmonale.     Therefore, she is discharged in good and stable condition to home with close outpatient follow-up.    The patient met 2-midnight criteria for an inpatient stay at the time of discharge.    Discharge Date  4/19/2019    FOLLOW UP ITEMS POST DISCHARGE  Cont inhaler treatments   Follow up with pulmonary     DISCHARGE DIAGNOSES  Principal Problem:    Acute exacerbation of chronic obstructive pulmonary disease (COPD) ([facility]) POA: Yes  Active Problems:    Acute on chronic respiratory failure with hypoxia ([facility]) POA: Yes    Major  depression in full remission (HCC) POA: Yes    Chronic kidney disease, stage 3 (HCC) POA: Yes    Hypothyroidism (Chronic) POA: Yes    Chronic use of opiate drugs therapeutic purposes POA: Yes    Tobacco dependence POA: Yes    Type 2 diabetes mellitus with hyperglycemia, and peripheral neuropathy, with long-term current use of insulin (HCC) POA: Yes    Obesity (BMI 30-39.9) POA: Yes    Exposure to hepatitis C POA: Yes    Pulmonary HTN (HCC) POA: Unknown    Hyperkalemia POA: Unknown  Resolved Problems:    Pulmonary arterial hypertension (HCC) POA: Unknown      FOLLOW UP  Future Appointments  Date Time Provider Department Center   4/22/2019 10:00 AM Brandon Manning M.D. PULM None   4/30/2019 11:00 AM Fadi Najjar, M.D. [facility] [address]   6/5/2019 2:00 PM Kavitha Mccall M.D. [facility]     No follow-up provider specified.    MEDICATIONS ON DISCHARGE     Medication List      START taking these medications      Instructions   fexofenadine 60 MG Tabs  Commonly known as:  ALLEGRA   Take 1 Tab by mouth every 12 hours for 7 days.  Dose:  60 mg     furosemide 20 MG Tabs  Start taking on:  4/20/2019  Commonly known as:  LASIX   Take 1 Tab by mouth every day.  Dose:  20 mg     guaiFENesin  MG Tb12  Commonly known as:  MUCINEX   Take 1 Tab by mouth every 12 hours for 7 days.  Dose:  600 mg        CHANGE how you take these medications      Instructions   budesonide-formoterol 160-4.5 MCG/ACT Aero  What changed:  · how much to take  · how to take this  · additional instructions  Commonly known as:  SYMBICORT   INHALE 2 PUFFS BY MOUTH TWO TIMES DAILY     insulin glargine 100 UNIT/ML Soln  What changed:  · medication strength  · how much to take  Commonly known as:  LANTUS   Inject 25 Units as instructed every evening.  Dose:  25 Units        CONTINUE taking these medications      Instructions   acetaminophen 325 MG Tabs  Commonly known as:  TYLENOL   Take 2 Tabs by mouth every 6 hours as needed (Mild Pain; (Pain scale 1-3); Temp  "greater than 100.5 F).  Dose:  650 mg     amitriptyline 50 MG Tabs  Commonly known as:  ELAVIL   Take 50 mg by mouth every bedtime.  Dose:  50 mg     amLODIPine 10 MG Tabs  Commonly known as:  NORVASC   Take 1 Tab by mouth every day.  Dose:  10 mg     ANTIOXIDANTS PO   Take 1 Tab by mouth every day.  Dose:  1 Tab     cyclobenzaprine 5 MG tablet  Commonly known as:  FLEXERIL   TAKE ONE TO TWO TABLETS BY MOUTH THREE TIMES DAILY AS NEEDED     Diclofenac Sodium 1 % Gel   Apply 2 g to skin as directed 2 times a day as needed (for pain).  Dose:  2 g     DULoxetine 60 MG Cpep delayed-release capsule  Commonly known as:  CYMBALTA   TAKE 1 CAPSULE BY MOUTH DAILY     ipratropium 17 MCG/ACT Aers  Commonly known as:  ATROVENT   Doctor's comments:  Instead of spiriva  Inhale 2 Puffs by mouth every 6 hours.  Dose:  2 Puff     levothyroxine 75 MCG Tabs  Commonly known as:  SYNTHROID   TAKE 1 TABLET BY MOUTH DAILY     nystatin/triamcinolone 929779-3.1 UNIT/GM-% Crea  Commonly known as:  MYCOLOG   APPLY A THIN LAYER TO FUNGAL RASH ONCE DAILY AS NEEDED     oxyCODONE immediate release 10 MG immediate release tablet  Start taking on:  5/9/2019  Commonly known as:  ROXICODONE   Take 1 Tab by mouth every four hours as needed for Moderate Pain for up to 30 days.  Dose:  10 mg     rosuvastatin 10 MG Tabs  Commonly known as:  CRESTOR   Take 1 Tab by mouth every evening.  Dose:  10 mg     traZODone 50 MG Tabs  Commonly known as:  DESYREL   Take 150 mg by mouth every bedtime.  Dose:  150 mg            Allergies  Allergies   Allergen Reactions   • Vitamin C Diarrhea     \"violent diarrhea\"   • Keflex Rash     Severe rash, but TOLERATES PENICILLINS, Rocephin    • Codeine Nausea     Severe stomach pain   • Gabapentin Palpitations     Gets shaky and falls    • Lyrica Nausea     Nausea, dizzy   • Sudafed Nausea and Unspecified     Chest pain, nausea       DIET  Orders Placed This Encounter   Procedures   • Diet Order Cardiac, Diabetic     Standing " Status:   Standing     Number of Occurrences:   1     Order Specific Question:   Diet:     Answer:   Cardiac [6]     Order Specific Question:   Diet:     Answer:   Diabetic [3]       ACTIVITY  As tolerated.  Weight bearing as tolerated    CONSULTATIONS  None    PROCEDURES  None    LABORATORY  Lab Results   Component Value Date    SODIUM 136 04/19/2019    POTASSIUM 5.1 04/19/2019    CHLORIDE 103 04/19/2019    CO2 26 04/19/2019    GLUCOSE 218 (H) 04/19/2019    BUN 71 (HH) 04/19/2019    CREATININE 1.70 (H) 04/19/2019        Lab Results   Component Value Date    WBC 12.5 (H) 04/19/2019    HEMOGLOBIN 9.4 (L) 04/19/2019    HEMATOCRIT 30.7 (L) 04/19/2019    PLATELETCT 198 04/19/2019        Total time of the discharge process exceeds 40 minutes.

## 2019-04-19 NOTE — CARE PLAN
Problem: Communication  Goal: The ability to communicate needs accurately and effectively will improve  Outcome: PROGRESSING AS EXPECTED    Intervention: [place] patient and significant other/support system to call light to alert staff of needs  Patient oriented to call light system and has been able to communicate needs accurately and effectively.      Problem: Safety  Goal: Will remain free from falls  Outcome: PROGRESSING AS EXPECTED  Patient has been assessed for fall risks and fall precautions have been put in place.

## 2019-04-19 NOTE — ASSESSMENT & PLAN NOTE
Found to be elevated dced oral potassium  Gave a dose of calcium  Will trend potassium levels 5.5- 5.8  Will give kayexalate

## 2019-04-19 NOTE — FACE TO FACE
Face to Face Supporting Documentation - Home Health    The encounter with this patient was in whole or in part the primary reason for home health admission.    Date of encounter:   Patient:                    MRN:                       YOB: 2020  Priya Callahan  [id number]  1950     Home health to see patient for:  Skilled Nursing care for assessment, interventions & education, Home health aide, Physical Therapy evaluation and treatment and Occupational therapy evaluation and treatment    Skilled need for:  Recent Deterioration of Health Status Worsening respiratory status    Skilled nursing interventions to include:  Home health aid, PT/OT    Homebound status evidenced by:  Need the aid of supportive devices such as crutches, canes, wheelchairs or walkers. Leaving home requires a considerable and taxing effort. There is a normal inability to leave the home.    Community Physician to provide follow up care: Kavitha Mccall M.D.     Optional Interventions? No      I certify the face to face encounter for this home health care referral meets the CMS requirements and the encounter/clinical assessment with the patient was, in whole, or in part, for the medical condition(s) listed above, which is the primary reason for home health care. Based on my clinical findings: the service(s) are medically necessary, support the need for home health care, and the homebound criteria are met.  I certify that this patient has had a face to face encounter by myself.  Rosibel Castro M.D. - NPI: [id number]

## 2019-04-22 ENCOUNTER — PATIENT OUTREACH (OUTPATIENT)
Dept: HEALTH INFORMATION MANAGEMENT | Facility: OTHER | Age: 69
End: 2019-04-22

## 2019-04-22 ENCOUNTER — TELEPHONE (OUTPATIENT)
Dept: HEALTH INFORMATION MANAGEMENT | Facility: OTHER | Age: 69
End: 2019-04-22

## 2019-04-22 ENCOUNTER — TELEMEDICINE2 (OUTPATIENT)
Dept: PULMONOLOGY | Facility: HOSPICE | Age: 69
End: 2019-04-22
Payer: MEDICARE

## 2019-04-22 VITALS
HEART RATE: 80 BPM | RESPIRATION RATE: 16 BRPM | HEIGHT: 65 IN | SYSTOLIC BLOOD PRESSURE: 140 MMHG | WEIGHT: 220 LBS | BODY MASS INDEX: 36.65 KG/M2 | TEMPERATURE: 97.8 F | DIASTOLIC BLOOD PRESSURE: 72 MMHG | OXYGEN SATURATION: 91 %

## 2019-04-22 DIAGNOSIS — J44.9 CHRONIC OBSTRUCTIVE PULMONARY DISEASE, UNSPECIFIED COPD TYPE (HCC): ICD-10-CM

## 2019-04-22 DIAGNOSIS — R06.02 SOB (SHORTNESS OF BREATH): ICD-10-CM

## 2019-04-22 DIAGNOSIS — R63.8 INCREASED BMI: ICD-10-CM

## 2019-04-22 DIAGNOSIS — G47.33 OSA (OBSTRUCTIVE SLEEP APNEA): ICD-10-CM

## 2019-04-22 DIAGNOSIS — R09.02 HYPOXEMIA: ICD-10-CM

## 2019-04-22 DIAGNOSIS — F17.200 TOBACCO DEPENDENCE: ICD-10-CM

## 2019-04-22 PROCEDURE — 99214 OFFICE O/P EST MOD 30 MIN: CPT | Performed by: INTERNAL MEDICINE

## 2019-04-22 RX ORDER — LEVOTHYROXINE SODIUM 75 UG/1
CAPSULE ORAL
Refills: 3 | COMMUNITY
Start: 2019-03-05 | End: 2019-06-03

## 2019-04-22 RX ORDER — CALCIPOTRIENE 50 UG/G
CREAM TOPICAL
Refills: 11 | COMMUNITY
Start: 2019-04-08 | End: 2019-06-03

## 2019-04-22 RX ORDER — LEVOTHYROXINE SODIUM 75 UG/1
CAPSULE ORAL
COMMUNITY
Start: 2019-03-05 | End: 2019-06-03

## 2019-04-22 RX ORDER — CYCLOBENZAPRINE HCL 10 MG
10 TABLET ORAL
Refills: 2 | COMMUNITY
Start: 2019-04-19 | End: 2019-08-09 | Stop reason: SDUPTHER

## 2019-04-22 RX ORDER — ALBUTEROL SULFATE 90 UG/1
2 AEROSOL, METERED RESPIRATORY (INHALATION) EVERY 4 HOURS PRN
Refills: 3 | COMMUNITY

## 2019-04-22 RX ORDER — IPRATROPIUM BROMIDE AND ALBUTEROL SULFATE 2.5; .5 MG/3ML; MG/3ML
SOLUTION RESPIRATORY (INHALATION)
Refills: 2 | COMMUNITY
Start: 2019-04-19 | End: 2019-04-25

## 2019-04-22 ASSESSMENT — PAIN SCALES - GENERAL: PAINLEVEL: NO PAIN

## 2019-04-22 NOTE — PROGRESS NOTES
SCP post discharge med rec completed. No clinically significant medication issues noted. Patient denies any side effects, barriers to accessing medications, or trouble with adherence.     Patient was recently admitted with COPD exacerbation. Med list shows Symbicort and Atrovent, although patient states she is not using Atrovent. Patient may benefit from replacing both of these inhalers with once daily Trelegy for cost and convenience purposes. Msg sent to PCP. Patient has upcoming follow up to discuss as well.    States she has difficulty affording OTC medications (Mucinex, Biotene, and Allegra) as well as pen needles. Consulted CC LSW regarding any possible reources. No resources for OTC OOP costs available. Advised patient of discounted pen needles at [facility] pharmacies.

## 2019-04-22 NOTE — PROGRESS NOTES
"Chief Complaint   Patient presents with   • New Patient     COPD       HPI:  The patient is a 68-year-old woman with a long history of chronic obstructive pulmonary disease.  She has smoked a pack of cigarettes a day since age 13.  She tells me that she quit smoking 8 days ago.  She was just discharged from the hospital after an acute exacerbation of chronic obstructive pulmonary disease.  In June 2017 she was hospitalized several times and required intubation and mechanical ventilation.  Most recently she was hospitalized from 4/14 to 4/19/2019.  She was hospitalized with a fever, wheezing, and increased shortness of breath.  She was started on steroids, antibiotics and bronchodilators.  She had improvement in her symptoms.  She is still on a tapering dose of prednisone.  She is not wheezing today.  Her chest x-ray from 4/14/2019 showed cardiomegaly but no acute infiltrate.  In February of this year she was hospitalized with a right distal fibula fracture.  This was felt to be nonsurgical.  She has no history of DVT or leg swelling.  On her most recent hospitalization she did not have a pulmonary CTA. She did improve with treatment directed at her COPD.  In January of this year she was admitted with hyperkalemia. The patient had been on lisinopril for hypertension.  Her medications include Symbicort, Pro Air, DuoNeb, and a current taper of prednisone.  She is also on Lasix.  She is on supplemental oxygen at 4 L/min 24/7.  She has a past history of obstructive sleep apnea and pulmonary hypertension.  She is not currently using her CPAP but is using supplemental oxygen at night.        Past Medical History:   Diagnosis Date   • Arthritis     \"everywhere\"   • ASTHMA    • Backpain     chronic back pain   • Breath shortness     \"only with flare up with allergies\"   • Bronchitis     as a child   • Congestive heart failure (HCC) 2013    related to pulmonary failure   • COPD    • Diabetes     Type 2   • Disorder of thyroid " "    Hypothyroid   • Fall    • Fibromyalgia    • GERD (gastroesophageal reflux disease)    • Heart burn    • Hepatitis C     \"I have markers in blood but not active\"   • Hypertension    • Indigestion    • Infectious disease     Hepatitis C   • Neuropathy (HCC)     bilat feet   • On home oxygen therapy    • Other specified symptom associated with female genital organs     Hysterectomy at age 22yrs   • Pain 9/13/2016    \"pain everywhere\"   • PND (post-nasal drip)    • Pneumonia     as a child   • Psychiatric problem 9/13/2016    PTSD   • RLS (restless legs syndrome)    • Sleep apnea     does not wear CPAP, \"can't do it\"   • Unspecified urinary incontinence        ROS:   Constitutional: Denies fevers, chills, night sweats, fatigue or weight loss  Eyes: Denies vision loss, pain, drainage, double vision  Ears, Nose, Throat: Denies earache, tinnitus, hoarseness  Cardiovascular: Denies chest pain, tightness, palpitations  Respiratory: See HPI  Sleep: See HPI  GI: Denies abdominal pain, nausea, vomiting, diarrhea  : Denies frequent urination, hematuria, painful urination  Musculoskeletal: Denies back pain, painful joints, sore muscles  Neurological: Denies headaches, seizures  Skin: Denies rashes, color changes  Psychiatric: Denies depression or thoughts of suicide  Hematologic: Denies bleeding tendency or clotting tendency  Allergic/Immunologic: Denies rhinitis, skin sensitivity    Social History     Social History   • Marital status:      Spouse name: N/A   • Number of children: N/A   • Years of education: N/A     Occupational History   • Not on file.     Social History Main Topics   • Smoking status: Current Some Day Smoker     Packs/day: 1.00     Years: 50.00     Types: Cigarettes     Last attempt to quit: 3/12/2018   • Smokeless tobacco: Never Used   • Alcohol use No   • Drug use: No   • Sexual activity: No     Other Topics Concern   • Not on file     Social History Narrative   • No narrative on file "     Vitamin c; Codeine; Gabapentin; Keflex; Lyrica; and Sudafed  Current Outpatient Prescriptions on File Prior to Visit   Medication Sig Dispense Refill   • budesonide-formoterol (SYMBICORT) 160-4.5 MCG/ACT Aerosol INHALE 2 PUFFS BY MOUTH TWO TIMES DAILY 30.6 g 0   • insulin glargine (LANTUS) 100 UNIT/ML Solution Inject 25 Units as instructed every evening. 10 mL 3   • furosemide (LASIX) 20 MG Tab Take 1 Tab by mouth every day. 60 Tab 0   • albuterol 108 (90 Base) MCG/ACT Aero Soln inhalation aerosol Inhale 2 Puffs by mouth every 6 hours as needed for Shortness of Breath. 8.5 g 3   • levothyroxine (SYNTHROID) 75 MCG Tab TAKE 1 TABLET BY MOUTH DAILY 90 Tab 3   • amLODIPine (NORVASC) 10 MG Tab Take 1 Tab by mouth every day. 30 Tab 1   • traZODone (DESYREL) 50 MG Tab Take 150 mg by mouth every bedtime.     • DULoxetine (CYMBALTA) 60 MG Cap DR Particles delayed-release capsule TAKE 1 CAPSULE BY MOUTH DAILY 90 Cap 1   • rosuvastatin (CRESTOR) 10 MG Tab Take 1 Tab by mouth every evening. 90 Tab 3   • ipratropium (ATROVENT) 17 MCG/ACT Aero Soln Inhale 2 Puffs by mouth every 6 hours. 17 g 11   • fexofenadine (ALLEGRA) 60 MG Tab Take 1 Tab by mouth every 12 hours for 7 days. 14 Tab 0   • guaiFENesin LA (MUCINEX) 600 MG TABLET SR 12 HR Take 1 Tab by mouth every 12 hours for 7 days. 14 Tab 0   • predniSONE (DELTASONE) 20 MG Tab Take 40 mg daily for 5 days,  take 30 mg daily for 5 days, take 20 mg daily for 5 days, take 10 mg daily for 5 days, then stop 30 Tab 0   • [START ON 5/9/2019] oxyCODONE immediate release (ROXICODONE) 10 MG immediate release tablet Take 1 Tab by mouth every four hours as needed for Moderate Pain for up to 30 days. 90 Tab 0   • nystatin/triamcinolone (MYCOLOG) 694684-8.1 UNIT/GM-% Cream APPLY A THIN LAYER TO FUNGAL RASH ONCE DAILY AS NEEDED 30 g 0   • cyclobenzaprine (FLEXERIL) 5 MG tablet TAKE ONE TO TWO TABLETS BY MOUTH THREE TIMES DAILY AS NEEDED 60 Tab 0   • amitriptyline (ELAVIL) 50 MG Tab Take 50  "mg by mouth every bedtime.  3   • acetaminophen (TYLENOL) 325 MG Tab Take 2 Tabs by mouth every 6 hours as needed (Mild Pain; (Pain scale 1-3); Temp greater than 100.5 F). 30 Tab 0   • Nutritional Supplements (ANTIOXIDANTS PO) Take 1 Tab by mouth every day.     • Diclofenac Sodium 1 % Gel Apply 2 g to skin as directed 2 times a day as needed (for pain).       No current facility-administered medications on file prior to visit.      /72   Pulse 80   Temp 36.6 °C (97.8 °F) (Oral)   Resp 16   Ht 1.651 m (5' 5\")   Wt 99.8 kg (220 lb)   SpO2 91%   Family History   Problem Relation Age of Onset   • Lung Disease Mother    • Alcohol/Drug Mother    • Heart Disease Mother    • Cancer Father    • Cancer Brother    • Diabetes Brother    • Diabetes Maternal Grandmother    • Stroke Paternal Grandmother    • Heart Disease Paternal Grandmother        Physical Exam:  No acute distress at rest on supplemental oxygen  HEENT: PERRLA, EOMI, no scleral icterus, no nasal or oral lesions  Neck: No thyromegaly, no adenopathy, no bruits  Mallampatti: Grade II  Lungs: Distant breath sounds, no wheezes or crackles  Heart: Regular rate and rhythm, no gallops or murmurs  Abdomen: Soft, benign, no organomegaly  Extremities: No clubbing, cyanosis, or edema  Neurologic: Cranial nerve, motor, and sensory exam are normal    1. SOB (shortness of breath)    2. Chronic obstructive pulmonary disease, unspecified COPD type (HCC)    3. Hypoxemia    4. Tobacco dependence    5. EDITH (obstructive sleep apnea)    6. Increased BMI        This woman has a long history of chronic obstructive pulmonary disease with multiple hospitalizations and required intubation and mechanical ventilation in June 2017.  She has been hospitalized 3 times in the last several months for various problems.  Most recently she has been hospitalized for an exacerbation of COPD.  Clinically there was no suggestion of pulmonary embolism and she has responded to treatment " directed at her COPD.  She will complete her prednisone taper.  She will remain on her current medications and supplemental oxygen.  We should see her back in several weeks for repeat evaluation once she is completed her course of prednisone.  If she does develop increasing shortness of breath I would think that a pulmonary CTA would be advisable to rule out pulmonary embolism since she did have a recent fibula fracture.  At this time however she does seem to be responding well to treatment directed at her chronic obstructive pulmonary disease.

## 2019-04-22 NOTE — TELEPHONE ENCOUNTER
Dear Dr. Mccall,    Patient was recently admitted with COPD exacerbation. Med list shows Symbicort and Atrovent, although patient states she is not using Atrovent. Would you consider replacing both of these inhalers with once daily Trelegy for cost and convenience purposes?    Thanks for your consideration,  Dale Olea, PharmD   [facility] [phone]

## 2019-04-24 ENCOUNTER — TELEPHONE (OUTPATIENT)
Dept: MEDICAL GROUP | Facility: PHYSICIAN GROUP | Age: 69
End: 2019-04-24

## 2019-04-24 DIAGNOSIS — J44.9 CHRONIC OBSTRUCTIVE PULMONARY DISEASE, UNSPECIFIED COPD TYPE (HCC): Chronic | ICD-10-CM

## 2019-04-24 NOTE — TELEPHONE ENCOUNTER
Was the patient seen in the last year in this department? Yes    Does patient have an active prescription for medications requested? No     Received Request Via: Pharmacy      Pt met protocol?: Yes    Pt last ov 4/19   Lab Results   Component Value Date/Time    HBA1C 6.0 (H) 01/22/2019 09:55 PM    pt was discharged from hospital with vial insulin but has had Pen insulin last written 2/21/2019

## 2019-04-24 NOTE — TELEPHONE ENCOUNTER
Patient has recently been seen by PCP within the last 6 months per protocol (4/19). Will refill DM supplies for 12 months.  Lab Results   Component Value Date/Time    HBA1C 6.0 (H) 01/22/2019 09:55 PM      Lab Results   Component Value Date/Time    MALBCRT 1,141 (H) 09/25/2017 11:55 AM    MICROALBUR 228.5 09/25/2017 11:55 AM      Lab Results   Component Value Date/Time    ALKPHOSPHAT 52 04/19/2019 02:30 AM    ASTSGOT 81 (H) 04/19/2019 02:30 AM    ALTSGPT 107 (H) 04/19/2019 02:30 AM    TBILIRUBIN 0.3 04/19/2019 02:30 AM

## 2019-04-25 NOTE — TELEPHONE ENCOUNTER
Please call the patient and let her know that we have sent in an RX for an inhaler that has 3 medications in it and is used only once daily. She no longer needs to use the atrovent or symbicort. Can continue albuterol as needed.

## 2019-04-25 NOTE — TELEPHONE ENCOUNTER
Phone Number Called: [phone] (home)       Message: patient notified and a letter was also sent out for her records.    Left Message for patient to call back: no

## 2019-04-30 ENCOUNTER — TELEMEDICINE2 (OUTPATIENT)
Dept: NEPHROLOGY | Facility: MEDICAL CENTER | Age: 69
End: 2019-04-30
Payer: MEDICARE

## 2019-04-30 ENCOUNTER — TELEMEDICINE ORIGINATING SITE VISIT (OUTPATIENT)
Dept: MEDICAL GROUP | Facility: PHYSICIAN GROUP | Age: 69
End: 2019-04-30
Payer: MEDICARE

## 2019-04-30 VITALS
WEIGHT: 220 LBS | TEMPERATURE: 97.4 F | HEIGHT: 65 IN | HEART RATE: 79 BPM | DIASTOLIC BLOOD PRESSURE: 60 MMHG | OXYGEN SATURATION: 92 % | RESPIRATION RATE: 16 BRPM | SYSTOLIC BLOOD PRESSURE: 106 MMHG | BODY MASS INDEX: 36.65 KG/M2

## 2019-04-30 DIAGNOSIS — J44.9 CHRONIC OBSTRUCTIVE PULMONARY DISEASE, UNSPECIFIED COPD TYPE (HCC): Chronic | ICD-10-CM

## 2019-04-30 DIAGNOSIS — G47.33 OSA (OBSTRUCTIVE SLEEP APNEA): Chronic | ICD-10-CM

## 2019-04-30 DIAGNOSIS — E11.65 TYPE 2 DIABETES MELLITUS WITH HYPERGLYCEMIA, WITH LONG-TERM CURRENT USE OF INSULIN (HCC): ICD-10-CM

## 2019-04-30 DIAGNOSIS — N18.30 STAGE 3 CHRONIC KIDNEY DISEASE (HCC): ICD-10-CM

## 2019-04-30 DIAGNOSIS — I10 ESSENTIAL HYPERTENSION: ICD-10-CM

## 2019-04-30 DIAGNOSIS — Z79.4 TYPE 2 DIABETES MELLITUS WITH HYPERGLYCEMIA, WITH LONG-TERM CURRENT USE OF INSULIN (HCC): ICD-10-CM

## 2019-04-30 PROCEDURE — 99214 OFFICE O/P EST MOD 30 MIN: CPT | Performed by: INTERNAL MEDICINE

## 2019-04-30 ASSESSMENT — ENCOUNTER SYMPTOMS
NAUSEA: 0
FEVER: 0
COUGH: 0
INSOMNIA: 1
SHORTNESS OF BREATH: 0
HYPERTENSION: 1
CHILLS: 0
VOMITING: 0

## 2019-04-30 NOTE — PROGRESS NOTES
"Subjective:      Priya Callahan is a 68 y.o. female who presents with Hypertension and Chronic Kidney Disease            The patient was recently hospitalized with COPD exacerbation, she is doing better      Hypertension   This is a chronic problem. The current episode started more than 1 year ago. The problem is unchanged. The problem is controlled. Pertinent negatives include no chest pain, malaise/fatigue, peripheral edema or shortness of breath. Risk factors for coronary artery disease include obesity, post-menopausal state and diabetes mellitus. Past treatments include calcium channel blockers and diuretics. The current treatment provides significant improvement. There are no compliance problems.  Hypertensive end-organ damage includes kidney disease. Identifiable causes of hypertension include chronic renal disease.   Chronic Kidney Disease   This is a chronic problem. The current episode started more than 1 year ago. The problem occurs constantly. The problem has been unchanged. Pertinent negatives include no chest pain, chills, coughing, fever, nausea, urinary symptoms or vomiting.       Review of Systems   Constitutional: Negative for chills, fever and malaise/fatigue.   Respiratory: Negative for cough and shortness of breath.    Cardiovascular: Negative for chest pain and leg swelling.   Gastrointestinal: Negative for nausea and vomiting.   Genitourinary: Negative for dysuria, frequency and urgency.   Psychiatric/Behavioral: The patient has insomnia.           Objective:     /60   Pulse 79   Temp 36.3 °C (97.4 °F)   Resp 16   Ht 1.651 m (5' 5\")   Wt 99.8 kg (220 lb)   SpO2 92%   BMI 36.61 kg/m²      Physical Exam   Constitutional: She is oriented to person, place, and time. She appears well-developed and well-nourished. No distress.   HENT:   Head: Normocephalic and atraumatic.   Right Ear: External ear normal.   Left Ear: External ear normal.   Nose: Nose normal.   Eyes: " "Conjunctivae are normal. Right eye exhibits no discharge. Left eye exhibits no discharge. No scleral icterus.   Cardiovascular: Normal rate and regular rhythm.    Pulmonary/Chest: Effort normal and breath sounds normal. No respiratory distress.   Musculoskeletal: She exhibits no edema.   Neurological: She is alert and oriented to person, place, and time.   Skin: Skin is warm. She is not diaphoretic.   Psychiatric: She has a normal mood and affect. Her behavior is normal.   Nursing note and vitals reviewed.     exam was done by the help of the nurse at the remote facility.    Past Medical History:   Diagnosis Date   • Arthritis     \"everywhere\"   • ASTHMA    • Backpain     chronic back pain   • Breath shortness     \"only with flare up with allergies\"   • Bronchitis     as a child   • Congestive heart failure (HCC) 2013    related to pulmonary failure   • COPD    • Diabetes     Type 2   • Disorder of thyroid     Hypothyroid   • Fall    • Fibromyalgia    • GERD (gastroesophageal reflux disease)    • Heart burn    • Hepatitis C     \"I have markers in blood but not active\"   • Hypertension    • Indigestion    • Infectious disease     Hepatitis C   • Neuropathy (HCC)     bilat feet   • On home oxygen therapy    • Other specified symptom associated with female genital organs     Hysterectomy at age 22yrs   • Pain 9/13/2016    \"pain everywhere\"   • PND (post-nasal drip)    • Pneumonia     as a child   • Psychiatric problem 9/13/2016    PTSD   • RLS (restless legs syndrome)    • Sleep apnea     does not wear CPAP, \"can't do it\"   • Unspecified urinary incontinence      Social History     Social History   • Marital status:      Spouse name: N/A   • Number of children: N/A   • Years of education: N/A     Occupational History   • Not on file.     Social History Main Topics   • Smoking status: Current Some Day Smoker     Packs/day: 1.00     Years: 50.00     Types: Cigarettes     Last attempt to quit: 3/12/2018   • " Smokeless tobacco: Never Used   • Alcohol use No   • Drug use: No   • Sexual activity: No     Other Topics Concern   • Not on file     Social History Narrative   • No narrative on file     Family History   Problem Relation Age of Onset   • Lung Disease Mother    • Alcohol/Drug Mother    • Heart Disease Mother    • Cancer Father    • Cancer Brother    • Diabetes Brother    • Diabetes Maternal Grandmother    • Stroke Paternal Grandmother    • Heart Disease Paternal Grandmother      Recent Labs      07/24/18   0932   04/17/19   0818  04/18/19   0820  04/18/19   1550  04/18/19   2243  04/19/19   0230   ALBUMIN  4.3   < >  3.6  3.6   --    --   3.5   HDL  51   --    --    --    --    --    --    TRIGLYCERIDE  266*   --    --    --    --    --    --    SODIUM  140   < >  136  135  133*  134*  136   POTASSIUM  5.8*   < >  6.0*  5.5  5.8*  5.2  5.1   CHLORIDE  104   < >  103  104  102  101  103   CO2  28   < >  20  23  26  24  26   BUN  33*   < >  61*  65*  69*  71*  71*   CREATININE  1.60*   < >  1.85*  1.78*  1.81*  1.85*  1.70*   PHOSPHORUS  4.2   --   4.9*   --    --    --    --     < > = values in this interval not displayed.               Assessment/Plan:     1. Essential hypertension  Controlled  Continue same medication regimen  Continue low-sodium diet  Consider starting low-dose ACE inhibitor instead of amlodipine    2. Stage 3 chronic kidney disease (HCC)  Stable  No uremic symptoms  Renal dose of medication  Avoid nephrotoxins  Continue same medication regimen      3. Type 2 diabetes mellitus with hyperglycemia, and peripheral neuropathy, with long-term current use of insulin ([facility])      4. Chronic obstructive pulmonary disease, unspecified COPD type ([facility])      5. EDITH (obstructive sleep apnea)  Patient is not using her CPAP machine  Patient was advised to follow-up with sleep physician specialist

## 2019-05-02 DIAGNOSIS — E78.2 MIXED HYPERLIPIDEMIA: Chronic | ICD-10-CM

## 2019-05-02 RX ORDER — ROSUVASTATIN CALCIUM 10 MG/1
10 TABLET, COATED ORAL EVERY EVENING
Qty: 100 TAB | Refills: 0 | Status: SHIPPED | OUTPATIENT
Start: 2019-05-02 | End: 2019-06-03

## 2019-05-09 RX ORDER — AMLODIPINE BESYLATE 10 MG/1
TABLET ORAL
Qty: 90 TAB | Refills: 1 | Status: SHIPPED | OUTPATIENT
Start: 2019-05-09 | End: 2019-06-03

## 2019-05-09 NOTE — TELEPHONE ENCOUNTER
*Patient is [facility] plus,100 day supply if possible*  Was the patient seen in the last year in this department? Yes    Does patient have an active prescription for medications requested? No     Received Request Via: Pharmacy    Pt met protocol?: Yes     Last OV 04/04/2019    BP Readings from Last 1 Encounters:   04/30/19 106/60

## 2019-05-09 NOTE — TELEPHONE ENCOUNTER
Refill X 6 months, sent to pharmacy.Pt. Seen in the last 6 months per protocol.   Lab Results   Component Value Date/Time    SODIUM 136 04/19/2019 02:30 AM    POTASSIUM 5.1 04/19/2019 02:30 AM    CHLORIDE 103 04/19/2019 02:30 AM    CO2 26 04/19/2019 02:30 AM    GLUCOSE 218 (H) 04/19/2019 02:30 AM    BUN 71 (HH) 04/19/2019 02:30 AM    CREATININE 1.70 (H) 04/19/2019 02:30 AM

## 2019-05-20 ENCOUNTER — OFFICE VISIT (OUTPATIENT)
Dept: URGENT CARE | Facility: PHYSICIAN GROUP | Age: 69
End: 2019-05-20
Payer: MEDICARE

## 2019-05-20 ENCOUNTER — PATIENT OUTREACH (OUTPATIENT)
Dept: HEALTH INFORMATION MANAGEMENT | Facility: OTHER | Age: 69
End: 2019-05-20

## 2019-05-20 VITALS
DIASTOLIC BLOOD PRESSURE: 68 MMHG | HEART RATE: 72 BPM | BODY MASS INDEX: 40.32 KG/M2 | WEIGHT: 242 LBS | TEMPERATURE: 98.4 F | OXYGEN SATURATION: 92 % | HEIGHT: 65 IN | RESPIRATION RATE: 14 BRPM | SYSTOLIC BLOOD PRESSURE: 128 MMHG

## 2019-05-20 DIAGNOSIS — J44.1 COPD EXACERBATION (HCC): ICD-10-CM

## 2019-05-20 DIAGNOSIS — R06.02 SHORTNESS OF BREATH: ICD-10-CM

## 2019-05-20 PROCEDURE — 99214 OFFICE O/P EST MOD 30 MIN: CPT | Mod: 25 | Performed by: FAMILY MEDICINE

## 2019-05-20 PROCEDURE — 94760 N-INVAS EAR/PLS OXIMETRY 1: CPT | Performed by: FAMILY MEDICINE

## 2019-05-20 RX ORDER — PREDNISONE 20 MG/1
40 TABLET ORAL DAILY
Qty: 10 TAB | Refills: 0 | Status: SHIPPED | OUTPATIENT
Start: 2019-05-20 | End: 2019-05-25

## 2019-05-20 RX ORDER — DOXYCYCLINE HYCLATE 100 MG
100 TABLET ORAL 2 TIMES DAILY
Qty: 20 TAB | Refills: 0 | Status: SHIPPED | OUTPATIENT
Start: 2019-05-20 | End: 2019-05-30

## 2019-05-20 ASSESSMENT — ENCOUNTER SYMPTOMS
WEIGHT LOSS: 0
SENSORY CHANGE: 0
EYE DISCHARGE: 0
FOCAL WEAKNESS: 0
EYE REDNESS: 0
MYALGIAS: 0

## 2019-05-20 NOTE — PROGRESS NOTES
"Subjective:      Priya Callahan is a 68 y.o. female who presents with Shortness of Breath (hurt to breath in/ ) and Cough            2 weeks progressively worse SOB and generalized weakness. This was about the time that she stopped prednisone from last hospitalization. Productive cough without blood in sputum. Possible fever. No chills. +PMH COPD. She is on 4L home O2. No PMH pneumonia. Chronic leg swelling is slightly worse. She is currently on lasix. No other aggravating or alleviating factors.          Review of Systems   Constitutional: Negative for malaise/fatigue and weight loss.   Eyes: Negative for discharge and redness.   Musculoskeletal: Negative for joint pain and myalgias.   Skin: Negative for itching and rash.   Neurological: Negative for sensory change and focal weakness.     .  Medications, Allergies, and current problem list reviewed today in Epic       Objective:     /68   Pulse 72   Temp 36.9 °C (98.4 °F) (Temporal)   Resp 14   Ht 1.651 m (5' 5\")   Wt 109.8 kg (242 lb)   SpO2 92%   BMI 40.27 kg/m²      Physical Exam   Constitutional: She is oriented to person, place, and time. She appears well-developed and well-nourished. No distress.   Patient in electric wheelchair   HENT:   Head: Normocephalic and atraumatic.   Eyes: Conjunctivae are normal.   Neck: Normal range of motion. Neck supple. No JVD present.   Cardiovascular: Normal rate, regular rhythm and normal heart sounds.    No murmur heard.  Pulmonary/Chest: Effort normal.   Few rhonchi, right greater than left.   Musculoskeletal: She exhibits edema ( Trace pretibial bilateral). She exhibits no tenderness.   Neurological: She is alert and oriented to person, place, and time.   Skin: Skin is warm and dry. No rash noted.               Assessment/Plan:   Pulse ox adequate on 4 L home O2    1. COPD exacerbation (HCC)  predniSONE (DELTASONE) 20 MG Tab    doxycycline (VIBRAMYCIN) 100 MG Tab   2. Shortness of breath   "     Differential diagnosis, natural history, supportive care, and indications for immediate follow-up discussed at length.     Recommend chest x-ray today.  Patient declines.  She will follow-up with primary care and pulmonary medicine.

## 2019-05-21 NOTE — TELEPHONE ENCOUNTER
*HISTORICAL MEDICATION*  Was the patient seen in the last year in this department? Yes    Does patient have an active prescription for medications requested? No     Received Request Via: Pharmacy      Pt met protocol?: Yes    LAST OV 04/04/2019

## 2019-05-22 RX ORDER — AMITRIPTYLINE HYDROCHLORIDE 50 MG/1
TABLET, FILM COATED ORAL
Qty: 90 TAB | Refills: 3 | Status: SHIPPED | OUTPATIENT
Start: 2019-05-22 | End: 2019-06-03

## 2019-05-31 NOTE — TELEPHONE ENCOUNTER
Was the patient seen in the last year in this department? Yes    Does patient have an active prescription for medications requested? No     Received Request Via: Pharmacy      Pt met protocol?: Yes, OV 4/19

## 2019-06-03 ENCOUNTER — HOSPITAL ENCOUNTER (INPATIENT)
Facility: MEDICAL CENTER | Age: 69
LOS: 5 days | DRG: 192 | End: 2019-06-08
Attending: EMERGENCY MEDICINE | Admitting: HOSPITALIST
Payer: MEDICARE

## 2019-06-03 ENCOUNTER — APPOINTMENT (OUTPATIENT)
Dept: RADIOLOGY | Facility: MEDICAL CENTER | Age: 69
DRG: 192 | End: 2019-06-03
Attending: EMERGENCY MEDICINE
Payer: MEDICARE

## 2019-06-03 DIAGNOSIS — R09.02 HYPOXIA: ICD-10-CM

## 2019-06-03 DIAGNOSIS — I50.33 ACUTE ON CHRONIC DIASTOLIC HEART FAILURE (HCC): ICD-10-CM

## 2019-06-03 DIAGNOSIS — J44.1 ACUTE EXACERBATION OF CHRONIC OBSTRUCTIVE PULMONARY DISEASE (COPD) (HCC): ICD-10-CM

## 2019-06-03 DIAGNOSIS — R06.02 SOB (SHORTNESS OF BREATH): ICD-10-CM

## 2019-06-03 DIAGNOSIS — J18.9 PNEUMONIA OF BOTH LOWER LOBES DUE TO INFECTIOUS ORGANISM: ICD-10-CM

## 2019-06-03 DIAGNOSIS — D64.9 ACUTE ANEMIA: ICD-10-CM

## 2019-06-03 PROBLEM — I50.30 DIASTOLIC HEART FAILURE (HCC): Status: ACTIVE | Noted: 2019-06-03

## 2019-06-03 LAB
ABO GROUP BLD: NORMAL
ALBUMIN SERPL BCP-MCNC: 3.1 G/DL (ref 3.2–4.9)
ALBUMIN/GLOB SERPL: 1.3 G/DL
ALP SERPL-CCNC: 43 U/L (ref 30–99)
ALT SERPL-CCNC: 19 U/L (ref 2–50)
ANION GAP SERPL CALC-SCNC: 9 MMOL/L (ref 0–11.9)
AST SERPL-CCNC: 24 U/L (ref 12–45)
BARCODED ABORH UBTYP: 9500
BARCODED ABORH UBTYP: 9500
BARCODED PRD CODE UBPRD: NORMAL
BARCODED PRD CODE UBPRD: NORMAL
BARCODED UNIT NUM UBUNT: NORMAL
BARCODED UNIT NUM UBUNT: NORMAL
BASOPHILS # BLD AUTO: 0.2 % (ref 0–1.8)
BASOPHILS # BLD: 0.02 K/UL (ref 0–0.12)
BILIRUB SERPL-MCNC: 0.5 MG/DL (ref 0.1–1.5)
BLD GP AB SCN SERPL QL: NORMAL
BNP SERPL-MCNC: 255 PG/ML (ref 0–100)
BUN SERPL-MCNC: 31 MG/DL (ref 8–22)
CALCIUM SERPL-MCNC: 8.2 MG/DL (ref 8.5–10.5)
CHLORIDE SERPL-SCNC: 102 MMOL/L (ref 96–112)
CO2 SERPL-SCNC: 26 MMOL/L (ref 20–33)
COMPONENT R 8504R: NORMAL
COMPONENT R 8504R: NORMAL
CREAT SERPL-MCNC: 1.99 MG/DL (ref 0.5–1.4)
EKG IMPRESSION: NORMAL
EOSINOPHIL # BLD AUTO: 0.06 K/UL (ref 0–0.51)
EOSINOPHIL NFR BLD: 0.5 % (ref 0–6.9)
ERYTHROCYTE [DISTWIDTH] IN BLOOD BY AUTOMATED COUNT: 55.8 FL (ref 35.9–50)
GLOBULIN SER CALC-MCNC: 2.4 G/DL (ref 1.9–3.5)
GLUCOSE SERPL-MCNC: 204 MG/DL (ref 65–99)
HCT VFR BLD AUTO: 19.9 % (ref 37–47)
HGB BLD-MCNC: 6 G/DL (ref 12–16)
IMM GRANULOCYTES # BLD AUTO: 0.19 K/UL (ref 0–0.11)
IMM GRANULOCYTES NFR BLD AUTO: 1.5 % (ref 0–0.9)
LACTATE BLD-SCNC: 1.2 MMOL/L (ref 0.5–2)
LYMPHOCYTES # BLD AUTO: 1.14 K/UL (ref 1–4.8)
LYMPHOCYTES NFR BLD: 8.9 % (ref 22–41)
MCH RBC QN AUTO: 28.7 PG (ref 27–33)
MCHC RBC AUTO-ENTMCNC: 30.2 G/DL (ref 33.6–35)
MCV RBC AUTO: 95.2 FL (ref 81.4–97.8)
MONOCYTES # BLD AUTO: 1.15 K/UL (ref 0–0.85)
MONOCYTES NFR BLD AUTO: 9 % (ref 0–13.4)
NEUTROPHILS # BLD AUTO: 10.26 K/UL (ref 2–7.15)
NEUTROPHILS NFR BLD: 79.9 % (ref 44–72)
NRBC # BLD AUTO: 0.05 K/UL
NRBC BLD-RTO: 0.4 /100 WBC
PLATELET # BLD AUTO: 235 K/UL (ref 164–446)
PMV BLD AUTO: 9.2 FL (ref 9–12.9)
POTASSIUM SERPL-SCNC: 4.3 MMOL/L (ref 3.6–5.5)
PRODUCT TYPE UPROD: NORMAL
PRODUCT TYPE UPROD: NORMAL
PROT SERPL-MCNC: 5.5 G/DL (ref 6–8.2)
RBC # BLD AUTO: 2.09 M/UL (ref 4.2–5.4)
RH BLD: NORMAL
SODIUM SERPL-SCNC: 137 MMOL/L (ref 135–145)
TROPONIN I SERPL-MCNC: 0.04 NG/ML (ref 0–0.04)
UNIT STATUS USTAT: NORMAL
UNIT STATUS USTAT: NORMAL
WBC # BLD AUTO: 12.8 K/UL (ref 4.8–10.8)

## 2019-06-03 PROCEDURE — 99285 EMERGENCY DEPT VISIT HI MDM: CPT

## 2019-06-03 PROCEDURE — 96368 THER/DIAG CONCURRENT INF: CPT

## 2019-06-03 PROCEDURE — 93005 ELECTROCARDIOGRAM TRACING: CPT

## 2019-06-03 PROCEDURE — 36415 COLL VENOUS BLD VENIPUNCTURE: CPT

## 2019-06-03 PROCEDURE — 86850 RBC ANTIBODY SCREEN: CPT

## 2019-06-03 PROCEDURE — 80053 COMPREHEN METABOLIC PANEL: CPT

## 2019-06-03 PROCEDURE — 30233N1 TRANSFUSION OF NONAUTOLOGOUS RED BLOOD CELLS INTO PERIPHERAL VEIN, PERCUTANEOUS APPROACH: ICD-10-PCS | Performed by: HOSPITALIST

## 2019-06-03 PROCEDURE — 87040 BLOOD CULTURE FOR BACTERIA: CPT | Mod: 91

## 2019-06-03 PROCEDURE — 86901 BLOOD TYPING SEROLOGIC RH(D): CPT

## 2019-06-03 PROCEDURE — 84484 ASSAY OF TROPONIN QUANT: CPT

## 2019-06-03 PROCEDURE — 96375 TX/PRO/DX INJ NEW DRUG ADDON: CPT

## 2019-06-03 PROCEDURE — 36430 TRANSFUSION BLD/BLD COMPNT: CPT

## 2019-06-03 PROCEDURE — 86923 COMPATIBILITY TEST ELECTRIC: CPT

## 2019-06-03 PROCEDURE — 83605 ASSAY OF LACTIC ACID: CPT

## 2019-06-03 PROCEDURE — 86900 BLOOD TYPING SEROLOGIC ABO: CPT

## 2019-06-03 PROCEDURE — 770020 HCHG ROOM/CARE - TELE (206)

## 2019-06-03 PROCEDURE — 85025 COMPLETE CBC W/AUTO DIFF WBC: CPT

## 2019-06-03 PROCEDURE — 96365 THER/PROPH/DIAG IV INF INIT: CPT

## 2019-06-03 PROCEDURE — 700111 HCHG RX REV CODE 636 W/ 250 OVERRIDE (IP): Performed by: EMERGENCY MEDICINE

## 2019-06-03 PROCEDURE — 99223 1ST HOSP IP/OBS HIGH 75: CPT | Performed by: HOSPITALIST

## 2019-06-03 PROCEDURE — 93005 ELECTROCARDIOGRAM TRACING: CPT | Performed by: EMERGENCY MEDICINE

## 2019-06-03 PROCEDURE — 83880 ASSAY OF NATRIURETIC PEPTIDE: CPT

## 2019-06-03 PROCEDURE — 94640 AIRWAY INHALATION TREATMENT: CPT

## 2019-06-03 PROCEDURE — 700105 HCHG RX REV CODE 258: Performed by: EMERGENCY MEDICINE

## 2019-06-03 PROCEDURE — 304561 HCHG STAT O2

## 2019-06-03 PROCEDURE — P9016 RBC LEUKOCYTES REDUCED: HCPCS

## 2019-06-03 PROCEDURE — 71045 X-RAY EXAM CHEST 1 VIEW: CPT

## 2019-06-03 PROCEDURE — 700101 HCHG RX REV CODE 250: Performed by: EMERGENCY MEDICINE

## 2019-06-03 RX ORDER — AMLODIPINE BESYLATE 10 MG/1
10 TABLET ORAL
Status: DISCONTINUED | OUTPATIENT
Start: 2019-06-03 | End: 2019-06-07

## 2019-06-03 RX ORDER — AMITRIPTYLINE HYDROCHLORIDE 50 MG/1
50 TABLET, FILM COATED ORAL
Status: DISCONTINUED | OUTPATIENT
Start: 2019-06-03 | End: 2019-06-08 | Stop reason: HOSPADM

## 2019-06-03 RX ORDER — AMOXICILLIN 250 MG
2 CAPSULE ORAL 2 TIMES DAILY
Status: DISCONTINUED | OUTPATIENT
Start: 2019-06-03 | End: 2019-06-08 | Stop reason: HOSPADM

## 2019-06-03 RX ORDER — CYCLOBENZAPRINE HCL 10 MG
10 TABLET ORAL
Status: DISCONTINUED | OUTPATIENT
Start: 2019-06-03 | End: 2019-06-08 | Stop reason: HOSPADM

## 2019-06-03 RX ORDER — ROSUVASTATIN CALCIUM 10 MG/1
10 TABLET, COATED ORAL EVERY MORNING
COMMUNITY
End: 2019-12-17

## 2019-06-03 RX ORDER — HEPARIN SODIUM 5000 [USP'U]/ML
5000 INJECTION, SOLUTION INTRAVENOUS; SUBCUTANEOUS EVERY 8 HOURS
Status: DISCONTINUED | OUTPATIENT
Start: 2019-06-03 | End: 2019-06-03

## 2019-06-03 RX ORDER — DULOXETIN HYDROCHLORIDE 60 MG/1
60 CAPSULE, DELAYED RELEASE ORAL DAILY
Status: DISCONTINUED | OUTPATIENT
Start: 2019-06-04 | End: 2019-06-08 | Stop reason: HOSPADM

## 2019-06-03 RX ORDER — SODIUM CHLORIDE 9 MG/ML
INJECTION, SOLUTION INTRAVENOUS CONTINUOUS
Status: DISPENSED | OUTPATIENT
Start: 2019-06-03 | End: 2019-06-04

## 2019-06-03 RX ORDER — SODIUM CHLORIDE 9 MG/ML
500 INJECTION, SOLUTION INTRAVENOUS
Status: DISCONTINUED | OUTPATIENT
Start: 2019-06-03 | End: 2019-06-08 | Stop reason: HOSPADM

## 2019-06-03 RX ORDER — INSULIN GLARGINE 100 [IU]/ML
20 INJECTION, SOLUTION SUBCUTANEOUS NIGHTLY
COMMUNITY

## 2019-06-03 RX ORDER — AZITHROMYCIN 250 MG/1
500 TABLET, FILM COATED ORAL ONCE
Status: DISCONTINUED | OUTPATIENT
Start: 2019-06-03 | End: 2019-06-04

## 2019-06-03 RX ORDER — INSULIN GLARGINE 100 [IU]/ML
20 INJECTION, SOLUTION SUBCUTANEOUS NIGHTLY
Status: DISCONTINUED | OUTPATIENT
Start: 2019-06-03 | End: 2019-06-04

## 2019-06-03 RX ORDER — POLYETHYLENE GLYCOL 3350 17 G/17G
1 POWDER, FOR SOLUTION ORAL
Status: DISCONTINUED | OUTPATIENT
Start: 2019-06-03 | End: 2019-06-08 | Stop reason: HOSPADM

## 2019-06-03 RX ORDER — TRAZODONE HYDROCHLORIDE 150 MG/1
150 TABLET ORAL
Refills: 3 | COMMUNITY
Start: 2019-05-08 | End: 2019-12-05

## 2019-06-03 RX ORDER — AZITHROMYCIN 500 MG/1
500 INJECTION, POWDER, LYOPHILIZED, FOR SOLUTION INTRAVENOUS ONCE
Status: COMPLETED | OUTPATIENT
Start: 2019-06-03 | End: 2019-06-03

## 2019-06-03 RX ORDER — OXYCODONE HYDROCHLORIDE 10 MG/1
10 TABLET ORAL EVERY 4 HOURS PRN
Status: DISCONTINUED | OUTPATIENT
Start: 2019-06-03 | End: 2019-06-08 | Stop reason: HOSPADM

## 2019-06-03 RX ORDER — PREDNISONE 20 MG/1
40 TABLET ORAL DAILY
Status: COMPLETED | OUTPATIENT
Start: 2019-06-04 | End: 2019-06-08

## 2019-06-03 RX ORDER — AZITHROMYCIN 250 MG/1
250 TABLET, FILM COATED ORAL EVERY 24 HOURS
Status: COMPLETED | OUTPATIENT
Start: 2019-06-04 | End: 2019-06-05

## 2019-06-03 RX ORDER — BISACODYL 10 MG
10 SUPPOSITORY, RECTAL RECTAL
Status: DISCONTINUED | OUTPATIENT
Start: 2019-06-03 | End: 2019-06-08 | Stop reason: HOSPADM

## 2019-06-03 RX ORDER — METHYLPREDNISOLONE SODIUM SUCCINATE 125 MG/2ML
125 INJECTION, POWDER, LYOPHILIZED, FOR SOLUTION INTRAMUSCULAR; INTRAVENOUS ONCE
Status: COMPLETED | OUTPATIENT
Start: 2019-06-03 | End: 2019-06-03

## 2019-06-03 RX ORDER — TRAZODONE HYDROCHLORIDE 50 MG/1
150 TABLET ORAL
Status: DISCONTINUED | OUTPATIENT
Start: 2019-06-03 | End: 2019-06-08 | Stop reason: HOSPADM

## 2019-06-03 RX ORDER — LEVOTHYROXINE SODIUM 0.07 MG/1
75 TABLET ORAL
Status: DISCONTINUED | OUTPATIENT
Start: 2019-06-04 | End: 2019-06-08 | Stop reason: HOSPADM

## 2019-06-03 RX ORDER — AMLODIPINE BESYLATE 10 MG/1
10 TABLET ORAL
Status: ON HOLD | COMMUNITY
End: 2019-06-08

## 2019-06-03 RX ORDER — IPRATROPIUM BROMIDE AND ALBUTEROL SULFATE 2.5; .5 MG/3ML; MG/3ML
6 SOLUTION RESPIRATORY (INHALATION)
Status: COMPLETED | OUTPATIENT
Start: 2019-06-03 | End: 2019-06-03

## 2019-06-03 RX ORDER — FUROSEMIDE 20 MG/1
20 TABLET ORAL DAILY
Status: DISCONTINUED | OUTPATIENT
Start: 2019-06-04 | End: 2019-06-05

## 2019-06-03 RX ORDER — ROSUVASTATIN CALCIUM 20 MG/1
10 TABLET, COATED ORAL EVERY MORNING
Status: DISCONTINUED | OUTPATIENT
Start: 2019-06-04 | End: 2019-06-08 | Stop reason: HOSPADM

## 2019-06-03 RX ADMIN — METHYLPREDNISOLONE SODIUM SUCCINATE 125 MG: 125 INJECTION, POWDER, FOR SOLUTION INTRAMUSCULAR; INTRAVENOUS at 21:48

## 2019-06-03 RX ADMIN — IPRATROPIUM BROMIDE AND ALBUTEROL SULFATE 6 ML: .5; 3 SOLUTION RESPIRATORY (INHALATION) at 21:33

## 2019-06-03 RX ADMIN — FENTANYL CITRATE 50 MCG: 50 INJECTION INTRAMUSCULAR; INTRAVENOUS at 22:53

## 2019-06-03 RX ADMIN — SODIUM CHLORIDE 3 G: 900 INJECTION INTRAVENOUS at 22:24

## 2019-06-03 RX ADMIN — AZITHROMYCIN MONOHYDRATE 500 MG: 500 INJECTION, POWDER, LYOPHILIZED, FOR SOLUTION INTRAVENOUS at 22:30

## 2019-06-03 ASSESSMENT — ENCOUNTER SYMPTOMS
WHEEZING: 1
NAUSEA: 0
SHORTNESS OF BREATH: 1
FEVER: 0
HEADACHES: 0
ABDOMINAL PAIN: 0
CONSTIPATION: 1
PHOTOPHOBIA: 0
DIARRHEA: 0
PALPITATIONS: 0
MYALGIAS: 0
VOMITING: 0
FOCAL WEAKNESS: 0
TINGLING: 0
DIZZINESS: 0
SORE THROAT: 0
CHILLS: 0
DEPRESSION: 0
BLOOD IN STOOL: 1
COUGH: 1

## 2019-06-03 ASSESSMENT — LIFESTYLE VARIABLES: EVER_SMOKED: YES

## 2019-06-03 ASSESSMENT — COPD QUESTIONNAIRES
COPD SCREENING SCORE: 8
DURING THE PAST 4 WEEKS HOW MUCH DID YOU FEEL SHORT OF BREATH: SOME OF THE TIME
HAVE YOU SMOKED AT LEAST 100 CIGARETTES IN YOUR ENTIRE LIFE: YES
DO YOU EVER COUGH UP ANY MUCUS OR PHLEGM?: YES, EVERY DAY

## 2019-06-04 PROBLEM — R60.9 EDEMA: Status: ACTIVE | Noted: 2019-06-04

## 2019-06-04 LAB
ANION GAP SERPL CALC-SCNC: 8 MMOL/L (ref 0–11.9)
BUN SERPL-MCNC: 30 MG/DL (ref 8–22)
CALCIUM SERPL-MCNC: 8.3 MG/DL (ref 8.5–10.5)
CHLORIDE SERPL-SCNC: 102 MMOL/L (ref 96–112)
CO2 SERPL-SCNC: 26 MMOL/L (ref 20–33)
CREAT SERPL-MCNC: 1.86 MG/DL (ref 0.5–1.4)
ERYTHROCYTE [DISTWIDTH] IN BLOOD BY AUTOMATED COUNT: 63.6 FL (ref 35.9–50)
GLUCOSE BLD-MCNC: 314 MG/DL (ref 65–99)
GLUCOSE BLD-MCNC: 327 MG/DL (ref 65–99)
GLUCOSE BLD-MCNC: 333 MG/DL (ref 65–99)
GLUCOSE BLD-MCNC: 366 MG/DL (ref 65–99)
GLUCOSE BLD-MCNC: 418 MG/DL (ref 65–99)
GLUCOSE SERPL-MCNC: 340 MG/DL (ref 65–99)
HCT VFR BLD AUTO: 23.8 % (ref 37–47)
HGB BLD-MCNC: 6.6 G/DL (ref 12–16)
HGB BLD-MCNC: 7 G/DL (ref 12–16)
HGB BLD-MCNC: 7.1 G/DL (ref 12–16)
MCH RBC QN AUTO: 27 PG (ref 27–33)
MCHC RBC AUTO-ENTMCNC: 29.4 G/DL (ref 33.6–35)
MCV RBC AUTO: 91.9 FL (ref 81.4–97.8)
PLATELET # BLD AUTO: 236 K/UL (ref 164–446)
PMV BLD AUTO: 9.3 FL (ref 9–12.9)
POTASSIUM SERPL-SCNC: 4.7 MMOL/L (ref 3.6–5.5)
RBC # BLD AUTO: 2.59 M/UL (ref 4.2–5.4)
SODIUM SERPL-SCNC: 136 MMOL/L (ref 135–145)
WBC # BLD AUTO: 12.7 K/UL (ref 4.8–10.8)

## 2019-06-04 PROCEDURE — P9016 RBC LEUKOCYTES REDUCED: HCPCS

## 2019-06-04 PROCEDURE — 99291 CRITICAL CARE FIRST HOUR: CPT | Performed by: HOSPITALIST

## 2019-06-04 PROCEDURE — 700105 HCHG RX REV CODE 258

## 2019-06-04 PROCEDURE — 84145 PROCALCITONIN (PCT): CPT

## 2019-06-04 PROCEDURE — 99406 BEHAV CHNG SMOKING 3-10 MIN: CPT

## 2019-06-04 PROCEDURE — 700101 HCHG RX REV CODE 250: Performed by: HOSPITALIST

## 2019-06-04 PROCEDURE — 36430 TRANSFUSION BLD/BLD COMPNT: CPT

## 2019-06-04 PROCEDURE — 700105 HCHG RX REV CODE 258: Performed by: EMERGENCY MEDICINE

## 2019-06-04 PROCEDURE — 700111 HCHG RX REV CODE 636 W/ 250 OVERRIDE (IP): Performed by: HOSPITALIST

## 2019-06-04 PROCEDURE — 85027 COMPLETE CBC AUTOMATED: CPT | Mod: 91

## 2019-06-04 PROCEDURE — 770020 HCHG ROOM/CARE - TELE (206)

## 2019-06-04 PROCEDURE — 86923 COMPATIBILITY TEST ELECTRIC: CPT

## 2019-06-04 PROCEDURE — 36415 COLL VENOUS BLD VENIPUNCTURE: CPT

## 2019-06-04 PROCEDURE — 94640 AIRWAY INHALATION TREATMENT: CPT

## 2019-06-04 PROCEDURE — A9270 NON-COVERED ITEM OR SERVICE: HCPCS | Performed by: HOSPITALIST

## 2019-06-04 PROCEDURE — 94760 N-INVAS EAR/PLS OXIMETRY 1: CPT

## 2019-06-04 PROCEDURE — 86480 TB TEST CELL IMMUN MEASURE: CPT

## 2019-06-04 PROCEDURE — 82962 GLUCOSE BLOOD TEST: CPT | Mod: 91

## 2019-06-04 PROCEDURE — 80069 RENAL FUNCTION PANEL: CPT

## 2019-06-04 PROCEDURE — 700102 HCHG RX REV CODE 250 W/ 637 OVERRIDE(OP): Performed by: HOSPITALIST

## 2019-06-04 PROCEDURE — 85018 HEMOGLOBIN: CPT | Mod: 91

## 2019-06-04 PROCEDURE — 80048 BASIC METABOLIC PNL TOTAL CA: CPT

## 2019-06-04 PROCEDURE — 83880 ASSAY OF NATRIURETIC PEPTIDE: CPT

## 2019-06-04 PROCEDURE — 700105 HCHG RX REV CODE 258: Performed by: HOSPITALIST

## 2019-06-04 RX ORDER — SALIVA STIMULANT COMB. NO.3
2 SPRAY, NON-AEROSOL (ML) MUCOUS MEMBRANE PRN
Status: DISCONTINUED | OUTPATIENT
Start: 2019-06-04 | End: 2019-06-08 | Stop reason: HOSPADM

## 2019-06-04 RX ORDER — LACTULOSE 20 G/30ML
30 SOLUTION ORAL 4 TIMES DAILY PRN
Status: DISCONTINUED | OUTPATIENT
Start: 2019-06-04 | End: 2019-06-08 | Stop reason: HOSPADM

## 2019-06-04 RX ORDER — INSULIN GLARGINE 100 [IU]/ML
25 INJECTION, SOLUTION SUBCUTANEOUS NIGHTLY
Status: DISCONTINUED | OUTPATIENT
Start: 2019-06-04 | End: 2019-06-05

## 2019-06-04 RX ORDER — FLUTICASONE PROPIONATE 44 UG/1
2 AEROSOL, METERED RESPIRATORY (INHALATION) EVERY MORNING
Status: DISCONTINUED | OUTPATIENT
Start: 2019-06-04 | End: 2019-06-08 | Stop reason: HOSPADM

## 2019-06-04 RX ORDER — SODIUM CHLORIDE 9 MG/ML
INJECTION, SOLUTION INTRAVENOUS
Status: COMPLETED
Start: 2019-06-04 | End: 2019-06-04

## 2019-06-04 RX ORDER — IPRATROPIUM BROMIDE AND ALBUTEROL SULFATE 2.5; .5 MG/3ML; MG/3ML
3 SOLUTION RESPIRATORY (INHALATION)
Status: DISCONTINUED | OUTPATIENT
Start: 2019-06-04 | End: 2019-06-08 | Stop reason: HOSPADM

## 2019-06-04 RX ORDER — IPRATROPIUM BROMIDE AND ALBUTEROL SULFATE 2.5; .5 MG/3ML; MG/3ML
3 SOLUTION RESPIRATORY (INHALATION)
Status: DISCONTINUED | OUTPATIENT
Start: 2019-06-04 | End: 2019-06-06

## 2019-06-04 RX ADMIN — AMLODIPINE BESYLATE 10 MG: 10 TABLET ORAL at 20:44

## 2019-06-04 RX ADMIN — LACTULOSE 30 ML: 20 SOLUTION ORAL at 13:49

## 2019-06-04 RX ADMIN — SODIUM CHLORIDE 1000 ML: 9 INJECTION, SOLUTION INTRAVENOUS at 16:42

## 2019-06-04 RX ADMIN — FLUTICASONE PROPIONATE 88 MCG: 44 AEROSOL, METERED RESPIRATORY (INHALATION) at 05:41

## 2019-06-04 RX ADMIN — SENNOSIDES AND DOCUSATE SODIUM 2 TABLET: 8.6; 5 TABLET ORAL at 18:01

## 2019-06-04 RX ADMIN — IPRATROPIUM BROMIDE AND ALBUTEROL SULFATE 3 ML: .5; 3 SOLUTION RESPIRATORY (INHALATION) at 23:43

## 2019-06-04 RX ADMIN — OXYCODONE HYDROCHLORIDE 10 MG: 10 TABLET ORAL at 02:15

## 2019-06-04 RX ADMIN — AMLODIPINE BESYLATE 10 MG: 10 TABLET ORAL at 01:38

## 2019-06-04 RX ADMIN — INSULIN HUMAN 10 UNITS: 100 INJECTION, SOLUTION PARENTERAL at 21:53

## 2019-06-04 RX ADMIN — IPRATROPIUM BROMIDE AND ALBUTEROL SULFATE 3 ML: .5; 3 SOLUTION RESPIRATORY (INHALATION) at 15:04

## 2019-06-04 RX ADMIN — PREDNISONE 40 MG: 20 TABLET ORAL at 05:40

## 2019-06-04 RX ADMIN — OXYCODONE HYDROCHLORIDE 10 MG: 10 TABLET ORAL at 13:49

## 2019-06-04 RX ADMIN — AMPICILLIN SODIUM AND SULBACTAM SODIUM 3 G: 2; 1 INJECTION, POWDER, FOR SOLUTION INTRAMUSCULAR; INTRAVENOUS at 03:19

## 2019-06-04 RX ADMIN — CYCLOBENZAPRINE 10 MG: 10 TABLET, FILM COATED ORAL at 01:50

## 2019-06-04 RX ADMIN — UMECLIDINIUM BROMIDE AND VILANTEROL TRIFENATATE 1 PUFF: 62.5; 25 POWDER RESPIRATORY (INHALATION) at 05:41

## 2019-06-04 RX ADMIN — OXYCODONE HYDROCHLORIDE 10 MG: 10 TABLET ORAL at 20:43

## 2019-06-04 RX ADMIN — LEVOTHYROXINE SODIUM 75 MCG: 75 TABLET ORAL at 05:40

## 2019-06-04 RX ADMIN — AMPICILLIN SODIUM AND SULBACTAM SODIUM 3 G: 2; 1 INJECTION, POWDER, FOR SOLUTION INTRAMUSCULAR; INTRAVENOUS at 11:12

## 2019-06-04 RX ADMIN — IPRATROPIUM BROMIDE AND ALBUTEROL SULFATE 3 ML: .5; 3 SOLUTION RESPIRATORY (INHALATION) at 20:09

## 2019-06-04 RX ADMIN — INSULIN HUMAN 12 UNITS: 100 INJECTION, SOLUTION PARENTERAL at 17:02

## 2019-06-04 RX ADMIN — CYCLOBENZAPRINE 10 MG: 10 TABLET, FILM COATED ORAL at 21:47

## 2019-06-04 RX ADMIN — SODIUM CHLORIDE: 9 INJECTION, SOLUTION INTRAVENOUS at 01:24

## 2019-06-04 RX ADMIN — FUROSEMIDE 20 MG: 20 TABLET ORAL at 05:40

## 2019-06-04 RX ADMIN — DULOXETINE HYDROCHLORIDE 60 MG: 60 CAPSULE, DELAYED RELEASE ORAL at 05:40

## 2019-06-04 RX ADMIN — INSULIN GLARGINE 20 UNITS: 100 INJECTION, SOLUTION SUBCUTANEOUS at 01:49

## 2019-06-04 RX ADMIN — IPRATROPIUM BROMIDE AND ALBUTEROL SULFATE 3 ML: .5; 3 SOLUTION RESPIRATORY (INHALATION) at 12:03

## 2019-06-04 RX ADMIN — AMPICILLIN SODIUM AND SULBACTAM SODIUM 3 G: 2; 1 INJECTION, POWDER, FOR SOLUTION INTRAMUSCULAR; INTRAVENOUS at 16:42

## 2019-06-04 RX ADMIN — TRAZODONE HYDROCHLORIDE 150 MG: 50 TABLET ORAL at 21:47

## 2019-06-04 RX ADMIN — AMITRIPTYLINE HYDROCHLORIDE 50 MG: 50 TABLET, FILM COATED ORAL at 20:43

## 2019-06-04 RX ADMIN — AZITHROMYCIN 250 MG: 250 TABLET, FILM COATED ORAL at 20:43

## 2019-06-04 RX ADMIN — ROSUVASTATIN CALCIUM 10 MG: 20 TABLET, FILM COATED ORAL at 05:41

## 2019-06-04 RX ADMIN — AMITRIPTYLINE HYDROCHLORIDE 50 MG: 50 TABLET, FILM COATED ORAL at 01:39

## 2019-06-04 RX ADMIN — INSULIN HUMAN 9 UNITS: 100 INJECTION, SOLUTION PARENTERAL at 11:15

## 2019-06-04 RX ADMIN — INSULIN GLARGINE 25 UNITS: 100 INJECTION, SOLUTION SUBCUTANEOUS at 21:53

## 2019-06-04 RX ADMIN — SENNOSIDES AND DOCUSATE SODIUM 2 TABLET: 8.6; 5 TABLET ORAL at 05:41

## 2019-06-04 RX ADMIN — TRAZODONE HYDROCHLORIDE 150 MG: 50 TABLET ORAL at 01:50

## 2019-06-04 RX ADMIN — IPRATROPIUM BROMIDE AND ALBUTEROL SULFATE 3 ML: .5; 3 SOLUTION RESPIRATORY (INHALATION) at 07:48

## 2019-06-04 RX ADMIN — INSULIN HUMAN 6 UNITS: 100 INJECTION, SOLUTION PARENTERAL at 05:39

## 2019-06-04 ASSESSMENT — COPD QUESTIONNAIRES
HAVE YOU SMOKED AT LEAST 100 CIGARETTES IN YOUR ENTIRE LIFE: YES
DURING THE PAST 4 WEEKS HOW MUCH DID YOU FEEL SHORT OF BREATH: SOME OF THE TIME
COPD SCREENING SCORE: 8
DO YOU EVER COUGH UP ANY MUCUS OR PHLEGM?: YES, EVERY DAY

## 2019-06-04 ASSESSMENT — ENCOUNTER SYMPTOMS
BLOOD IN STOOL: 0
VOMITING: 0
CONSTIPATION: 0
CHILLS: 0
HEADACHES: 0
NAUSEA: 0
SHORTNESS OF BREATH: 0
COUGH: 0
FEVER: 0
WHEEZING: 0
DIARRHEA: 0

## 2019-06-04 ASSESSMENT — COGNITIVE AND FUNCTIONAL STATUS - GENERAL
TOILETING: A LITTLE
SUGGESTED CMS G CODE MODIFIER DAILY ACTIVITY: CJ
STANDING UP FROM CHAIR USING ARMS: A LITTLE
DRESSING REGULAR UPPER BODY CLOTHING: A LITTLE
MOVING FROM LYING ON BACK TO SITTING ON SIDE OF FLAT BED: A LITTLE
MOVING TO AND FROM BED TO CHAIR: A LITTLE
DAILY ACTIVITIY SCORE: 20
WALKING IN HOSPITAL ROOM: A LOT
SUGGESTED CMS G CODE MODIFIER MOBILITY: CK
TURNING FROM BACK TO SIDE WHILE IN FLAT BAD: A LITTLE
DRESSING REGULAR LOWER BODY CLOTHING: A LITTLE
HELP NEEDED FOR BATHING: A LITTLE
MOBILITY SCORE: 16
CLIMB 3 TO 5 STEPS WITH RAILING: A LOT

## 2019-06-04 ASSESSMENT — LIFESTYLE VARIABLES: EVER_SMOKED: YES

## 2019-06-04 NOTE — ASSESSMENT & PLAN NOTE
Patient has not had a colonoscopy in over 5 years.  She denies any NSAID use.    She does state that she has significant hemorrhoids however  She has not had any active bleeding in the emergency room.    Occult stool was positive  No melena or hematochezia seen  We will continue to monitor and consider GI consult if patient develops any visible bleeding or anemia does not stabilize  Discussed with GI Dr. Ibarra and patient may follow-up outpatient for colonoscopy

## 2019-06-04 NOTE — CARE PLAN
Problem: Infection  Goal: Will remain free from infection    Intervention: Implement standard precautions and perform hand washing before and after patient contact  Patient educated on hand hygiene and importance of cleanliness. Patient verbalizes understanding. RN performs appropriate hand hygiene and maintains aseptic technique / standard precautions when in contact with patient.      Problem: Knowledge Deficit  Goal: Knowledge of disease process/condition, treatment plan, diagnostic tests, and medications will improve    Intervention: Explain information regarding disease process/condition, treatment plan, diagnostic tests, and medications and document in education  Patient educated on POC, treatment plan, diagnostics tests, medications, diet, safety, activity, and encouraged to ask questions regarding care. Patient verbalizes understanding, and participates in care. Reoriented to call light for assistance. Hourly rounding in place.

## 2019-06-04 NOTE — PROGRESS NOTES
Received report from NOC RN. Assumed care of patient at 0700. Patient A&Ox4. On 4L NC. POC discussed and agreed upon with patient. Call light and belongings within reach. Bed in lowest locked position. Upper side rails raised. Bed alarm on. Fall risk precautions in place. Hourly rounding in place. Will continue to monitor.

## 2019-06-04 NOTE — CARE PLAN
Problem: Communication  Goal: The ability to communicate needs accurately and effectively will improve    Intervention: [place] patient and significant other/support system to call light to alert staff of needs   06/04/19 0340   OTHER   Oriented to: All of the Following : Location of Bathroom, Visiting Policy, Unit Routine, Call Light and Bedside Controls, Bedside Rail Policy, Smoking Policy, Rights and Responsibilities, Bedside Report, and Patient Education Notebook         Problem: Infection  Goal: Will remain free from infection    Intervention: Give CDC handouts for infection prevention (infection prevention/hand washing, disease specific, and device specific) and document in Education  Handout provided.

## 2019-06-04 NOTE — PROGRESS NOTES
Patient's HGB resulted as 6.6. Dr. Jimenez made aware. MD stated he would place orders accordingly.

## 2019-06-04 NOTE — ED TRIAGE NOTES
Patient brought in by EMS for above cc.  Patient was found at home on 4lpm, in the 75's.  Patient states she is normal in the low 80 daily.  Patient has history of CHF, COPD. Seen here on 4/14 for SOB.  Patient currently on 4lpm in the mid 80's, on monitor chart up for ERP

## 2019-06-04 NOTE — H&P
Hospital Medicine History & Physical Note    Date of Service  6/3/2019    Primary Care Physician  Kavitha Mccall M.D.    Consultants  None    Code Status  Full    Chief Complaint  Chief Complaint   Patient presents with   • Shortness of Breath     increased SOB x 1 week       History of Presenting Illness  68 y.o. female who presented on 6/3/2019 with shortness of breath.  This is a pleasant older female with a known history of oxygen dependent COPD and comes in with complaints of worsening shortness of breath.  She was admitted to our facility in March for COPD exacerbation and had been discharged home on oral steroids and antibiotics.  She states that unfortunately, she has not felt well since then.  She was seen by her primary care provider after her discharge for hospital follow-up and she states that her antibiotics and steroid was extended.  When she did not feel improve, she presented herself to an urgent care where again she was placed on an additional extended course of steroids and antibiotics.  She has been off of steroids for about 2 weeks now.  However in the last 4 days, she began to have worsening shortness of breath.  Initially thought was related to her home equipment as she was apparently found to have a broken tubing.  These were all replaced but despite this, she had no improvement in her symptoms.  She denies any sick contacts.  She is had a cough productive of yellowish sputum as well as a subjective fever and chills which she describes as feeling sweaty at night.  Patient also reports that she has chronic constipation and has had great difficulty having bowel movements for the last multiple days.  Several days ago, while straining on the toilet, she had several episodes of bright red blood per rectum.  She denies any NSAID use, no hemoptysis, no hematemesis.  Her last colonoscopy was greater than 5 years ago but less than 10 and by her report was normal.    Review of Systems  Review of  "Systems   Constitutional: Positive for malaise/fatigue. Negative for chills and fever.   HENT: Negative for congestion and sore throat.    Eyes: Negative for photophobia.   Respiratory: Positive for cough, shortness of breath and wheezing.    Cardiovascular: Negative for chest pain and palpitations.   Gastrointestinal: Positive for blood in stool and constipation. Negative for abdominal pain, diarrhea, nausea and vomiting.   Genitourinary: Negative for dysuria.   Musculoskeletal: Negative for myalgias.   Skin: Negative.    Neurological: Negative for dizziness, tingling, focal weakness and headaches.   Psychiatric/Behavioral: Negative for depression and suicidal ideas.       Past Medical History  Past Medical History:   Diagnosis Date   • Pain 9/13/2016    \"pain everywhere\"   • Psychiatric problem 9/13/2016    PTSD   • Congestive heart failure (HCC) 2013    related to pulmonary failure   • Arthritis     \"everywhere\"   • ASTHMA    • Backpain     chronic back pain   • Breath shortness     \"only with flare up with allergies\"   • Bronchitis     as a child   • COPD    • Diabetes     Type 2   • Disorder of thyroid     Hypothyroid   • Fall    • Fibromyalgia    • GERD (gastroesophageal reflux disease)    • Heart burn    • Hepatitis C     \"I have markers in blood but not active\"   • Hypertension    • Indigestion    • Infectious disease     Hepatitis C   • Neuropathy (HCC)     bilat feet   • On home oxygen therapy    • Other specified symptom associated with female genital organs     Hysterectomy at age 22yrs   • PND (post-nasal drip)    • Pneumonia     as a child   • RLS (restless legs syndrome)    • Sleep apnea     does not wear CPAP, \"can't do it\"   • Unspecified urinary incontinence        Surgical History  Past Surgical History:   Procedure Laterality Date   • ANKLE ORIF Left 5/8/2017    Procedure: ANKLE ORIF;  Surgeon: Rico Gtz M.D.;  Location: SURGERY [facility];  Service:    • UT DSTR NROLYTC AGNT " PARVERTEB FCT SNGL LMBR/SACRAL Left 9/16/2016    Procedure: NEURO DEST FACET L/S W/IG SNGL - L3-S1;  Surgeon: Xavier Arteaga D.O.;  Location: SURGERY [facility];  Service: Pain Management   • AZ DSTR NROLYTC AGNT PARVERTEB FCT ADDL LMBR/SACRAL  9/16/2016    Procedure: NEURO DEST FACET L/S W/IG ADDL;  Surgeon: Xavier Arteaga D.O.;  Location: SURGERY SURG[facility];  Service: Pain Management   • AZ DSTR NROLYTC AGNT PARVERTEB FCT ADDL LMBR/SACRAL  9/16/2016    Procedure: NEURO DEST FACET L/S W/IG ADDL;  Surgeon: Xavier Arteaga D.O.;  Location: SURGERY SURGICAL [facility];  Service: Pain Management   • PB FLUOROSCOPIC GUIDANCE NEEDLE PLACEMENT  9/16/2016    Procedure: FLOURO GUIDE NEEDLE PLACEMENT;  Surgeon: Xavier Arteaga D.O.;  Location: SURGERY SURGICAL [facility];  Service: Pain Management   • AZ DSTR NROLYTC AGNT PARVERTEB FCT SNGL LMBR/SACRAL Right 11/6/2015    Procedure: NEURO DEST FACET L/S W/IG SNGL - L3-S1;  Surgeon: Xavier Arteaga D.O.;  Location: SURGERY SURGICAL [facility];  Service: Pain Management   • AZ DSTR NROLYTC AGNT PARVERTEB FCT ADDL LMBR/SACRAL  11/6/2015    Procedure: NEURO DEST FACET L/S W/IG ADDL;  Surgeon: Xavier Arteaga D.O.;  Location: SURGERY SURGICAL [facility];  Service: Pain Management   • PB INJECT RX OTHER PERIPH NERVE  11/6/2015    Procedure: NEUROLYTIC DEST-OTHER NERVE;  Surgeon: Xavier Arteaga D.O.;  Location: SURGERY SURGICAL [facility];  Service: Pain Management   • AZ DSTR NROLYTC AGNT PARVERTEB FCT ADDL LMBR/SACRAL  11/6/2015    Procedure: NEURO DEST FACET L/S W/IG ADDL;  Surgeon: Xavier Arteaga D.O.;  Location: SURGERY SURGICAL [facility];  Service: Pain Management   • AZ DSTR NROLYTC AGNT PARVERTEB FCT SNGL LMBR/SACRAL Left 10/2/2015    Procedure: NEURO DEST FACET L/S W/IG SNGL - L3-S1;  Surgeon: Xavier Arteaga D.O.;  Location: SURGERY [facility];  Service: Pain Management   • AZ DSTR NROLYTC AGNT PARVERTEB FCT ADDL LMBR/SACRAL   10/2/2015    Procedure: NEURO DEST FACET L/S W/IG ADDL;  Surgeon: Xavier Arteaga D.O.;  Location: SURGERY [facility];  Service: Pain Management   • PB INJECT RX OTHER PERIPH NERVE  10/2/2015    Procedure: NEUROLYTIC DEST-OTHER NERVE;  Surgeon: Xavier Arteaga D.O.;  Location: SURGERY [facility];  Service: Pain Management   • CT DSTR NROLYTC AGNT PARVERTEB FCT ADDL LMBR/SACRAL  10/2/2015    Procedure: NEURO DEST FACET L/S W/IG ADDL;  Surgeon: Xavier Arteaga D.O.;  Location: SURGERY [facility];  Service: Pain Management   • PB INJ DX/THER AGNT PARAVERT FACET JOINT, OBED* Left 7/17/2015    Procedure: INJ PARA FACET L/S 1 LVL W/IG - L3-S1;  Surgeon: Xavier Arteaga D.O.;  Location: [facility] ORS;  Service: Pain Management   • PB INJ DX/THER AGNT PARAVERT FACET JOINT, OBED*  7/17/2015    Procedure: INJ PARA FACET L/S 2D LVL W/IG;  Surgeon: Xavier Arteaga D.O.;  Location: SURGERY [facility];  Service: Pain Management   • PB INJ DX/THER AGNT PARAVERT FACET JOINT, OBED*  7/17/2015    Procedure: INJ PARA FACET L/S 3D LVL W/IG;  Surgeon: Xavier Arteaga D.O.;  Location: SURGERY [facility];  Service: Pain Management   • PB INJECT NERV BLCK,OTHR PERIPH NERV  7/17/2015    Procedure: INJ-ANES AGENT-OTHER PHER.NRVE;  Surgeon: Xavier Arteaga D.O.;  Location: SURGERY [facility];  Service: Pain Management   • PB INJ DX/THER AGNT PARAVERT FACET JOINT, OBED* Left 7/10/2015    Procedure: INJ PARA FACET L/S 1 LVL W/IG - L3-S1;  Surgeon: Xavier Arteaga D.O.;  Location: SURGERY [facility];  Service: Pain Management   • PB INJ DX/THER AGNT PARAVERT FACET JOINT, OBED*  7/10/2015    Procedure: INJ PARA FACET L/S 2D LVL W/IG;  Surgeon: Xavier Arteaga D.O.;  Location: SURGERY SURGICAL [facility];  Service: Pain Management   • PB INJ DX/THER AGNT PARAVERT FACET JOINT, OBED*  7/10/2015    Procedure: INJ PARA FACET L/S 3D LVL W/IG;  Surgeon: Xavier Arteaga D.O.;   "Location: SURGERY [facility];  Service: Pain Management   • PB INJECT NERV BLCK,OTGRADY PERIPH NERV  7/10/2015    Procedure: INJ-ANES AGENT-OTHER PHER.NRVE;  Surgeon: Xavier Arteaga D.O.;  Location: SURGERY [facility];  Service: Pain Management   • GYN SURGERY      hysterectomy    • LUMPECTOMY Left     Left breast   • OTHER ORTHOPEDIC SURGERY      L knee replacement    • OTHER ORTHOPEDIC SURGERY      R carpal tunnel    • US-NEEDLE CORE BX-BREAST PANEL         Family History  Family History   Problem Relation Age of Onset   • Lung Disease Mother    • Alcohol/Drug Mother    • Heart Disease Mother    • Cancer Father    • Cancer Brother    • Diabetes Brother    • Diabetes Maternal Grandmother    • Stroke Paternal Grandmother    • Heart Disease Paternal Grandmother        Social History  Social History   Substance Use Topics   • Smoking status: Former Smoker     Packs/day: 1.00     Years: 50.00     Types: Cigarettes     Quit date: 3/20/2019   • Smokeless tobacco: Never Used   • Alcohol use No       Allergies  Allergies   Allergen Reactions   • Vitamin C Diarrhea     \"violent diarrhea\"   • Codeine Nausea     Severe stomach pain   • Gabapentin Palpitations     Gets shaky and falls    • Keflex Rash     Severe rash, but TOLERATES PENICILLINS, Rocephin    • Lyrica Nausea     Nausea, dizzy   • Sudafed Nausea and Unspecified     Chest pain, nausea       Medications  No current facility-administered medications on file prior to encounter.      Current Outpatient Prescriptions on File Prior to Encounter   Medication Sig Dispense Refill   • albuterol 108 (90 Base) MCG/ACT Aero Soln inhalation aerosol Inhale 2 Puffs by mouth every four hours as needed for Shortness of Breath.  3   • cyclobenzaprine (FLEXERIL) 10 MG Tab Take 10 mg by mouth every bedtime.  2   • furosemide (LASIX) 20 MG Tab Take 1 Tab by mouth every day. 60 Tab 0   • oxyCODONE immediate release (ROXICODONE) 10 MG immediate release tablet Take 1 Tab by " mouth every four hours as needed for Moderate Pain for up to 30 days. 90 Tab 0   • levothyroxine (SYNTHROID) 75 MCG Tab TAKE 1 TABLET BY MOUTH DAILY 90 Tab 3   • amitriptyline (ELAVIL) 50 MG Tab Take 50 mg by mouth every bedtime.  3   • DULoxetine (CYMBALTA) 60 MG Cap DR Particles delayed-release capsule TAKE 1 CAPSULE BY MOUTH DAILY 90 Cap 1       Physical Exam  Hemodynamics  Temp (24hrs), Av.6 °C (97.9 °F), Min:36.6 °C (97.9 °F), Max:36.6 °C (97.9 °F)   Temperature: 36.6 °C (97.9 °F)  Pulse  Av.7  Min: 62  Max: 87 Heart Rate (Monitored): 67  Blood Pressure : 105/54, NIBP: 107/43      Respiratory      Respiration: 15, Pulse Oximetry: 94 %, O2 Daily Delivery Respiratory : Silicone Nasal Cannula     Given By:: Mouthpiece, Work Of Breathing / Effort: Moderate  RUL Breath Sounds: Clear, RML Breath Sounds: Diminished, RLL Breath Sounds: Diminished, BIRD Breath Sounds: Clear, LLL Breath Sounds: Diminished    Physical Exam   Constitutional: She is oriented to person, place, and time. No distress.   Morbidly obese   HENT:   Head: Normocephalic and atraumatic.   Right Ear: External ear normal.   Left Ear: External ear normal.   Eyes: EOM are normal. Right eye exhibits no discharge. Left eye exhibits no discharge.   Neck: Neck supple. No JVD present.   Cardiovascular: Normal rate, regular rhythm and normal heart sounds.    Pulmonary/Chest: Effort normal. No respiratory distress. She has wheezes. She exhibits no tenderness.   Very diminished breath sounds, likely due to habitus   Abdominal: Soft. Bowel sounds are normal. She exhibits no distension. There is no tenderness.   Musculoskeletal: Normal range of motion. She exhibits no edema.   Neurological: She is alert and oriented to person, place, and time. No cranial nerve deficit.   Skin: Skin is warm and dry. She is not diaphoretic. No erythema.   Psychiatric: She has a normal mood and affect. Her behavior is normal.   Nursing note and vitals reviewed.    Capillary  refill less than 3 seconds, distal pulses intact    Laboratory:  Recent Labs      06/03/19 2124   WBC  12.8*   RBC  2.09*   HEMOGLOBIN  6.0*   HEMATOCRIT  19.9*   MCV  95.2   MCH  28.7   MCHC  30.2*   RDW  55.8*   PLATELETCT  235   MPV  9.2     Recent Labs      06/03/19 2124   SODIUM  137   POTASSIUM  4.3   CHLORIDE  102   CO2  26   GLUCOSE  204*   BUN  31*   CREATININE  1.99*   CALCIUM  8.2*     Recent Labs      06/03/19 2124   ALTSGPT  19   ASTSGOT  24   ALKPHOSPHAT  43   TBILIRUBIN  0.5   GLUCOSE  204*         Recent Labs      06/03/19 2124   BNPBTYPENAT  255*         Lab Results   Component Value Date    TROPONINI 0.04 06/03/2019       Imaging  Dx-chest-portable (1 View)    Result Date: 6/3/2019  6/3/2019 9:42 PM HISTORY/REASON FOR EXAM:  Shortness of Breath. TECHNIQUE/EXAM DESCRIPTION AND NUMBER OF VIEWS: Single portable view of the chest. COMPARISON: 4/14/2019 FINDINGS: Single portable view of the chest demonstrates a stable cardiac silhouette and mediastinal contours. There are hazy bibasilar opacities. There is mild interstitial prominence, likely related to portable AP technique. No significant pleural fluid or pneumothorax. No suspicious bony lesions.     Hazy bibasilar opacities, possibly related to overlying soft tissues. Overlying atelectasis or early pneumonia cannot be excluded.        Assessment/Plan:  Anticipate that patient will need greater than 2 midnights for management of the discussed medical issues.    * Acute exacerbation of chronic obstructive pulmonary disease (COPD) ([facility])- (present on admission)   Assessment & Plan    Patient has a known history of oxygen dependent COPD.  He is typically on 4 L at baseline but is currently requiring 6 L to maintain saturation greater than 88%.  Chest x-ray shows bibasilar opacities which could be early pneumonia.  She does have leukocytosis but is otherwise afebrile with no lactic acidosis and stable vital signs.  At this point, she will be  admitted to the hospital for continued treatment.  I will check a procalcitonin level, sputum sample, and start on empiric and biotics with IV unit Theramycin.  Pending the resultsof the procalcitonin level and sputum sample, and we can begin to de-escalate or discontinue antibiotics.  She will be placed on respiratory therapy protocol, I will start her on steroids orally and will assess for improvement.  No evidence of volume overload at present but will avoid excessive IV saline.     Anemia requiring transfusions   Assessment & Plan    Patient has not had a colonoscopy in over 5 years.  She denies any NSAID use.  She has not had any active bleeding in the emergency room.  Occult stool was positive and she will be admitted and monitored.  I am holding off on additional fluids for now but I will continue to trend hemoglobin levels and will plan to discuss this patient with GI in the morning.     Chronic hypertension- (present on admission)   Assessment & Plan    This is chronic, blood pressure currently controlled on home Norvasc.  Okay to continue with holding parameters.     Diastolic heart failure (HCC)   Assessment & Plan    Last echocardiogram showed a preserved left ventricular systolic function with an ejection fraction of 60% and grade 2 diastolic dysfunction.  She shows no evidence of volume overload at present, continue home Lasix.     Type 2 diabetes mellitus with hyperglycemia, and peripheral neuropathy, with long-term current use of insulin (HCC)- (present on admission)   Assessment & Plan    This is chronic, continue home Lantus, monitor with Accu-Cheks and cover with insulin sliding scale.     Chronic constipation- (present on admission)   Assessment & Plan    Likely due to chronic opioid use.  If no improvement with bowel protocol, consider Relistor.     Hypothyroidism- (present on admission)   Assessment & Plan    This is chronic and stable, continue home Synthroid.     Chronic kidney disease, stage  3 (HCC)- (present on admission)   Assessment & Plan    Patient's renal function is at baseline, monitor.         Prophylaxis: Sequential compression devices for DVT prophylaxis, no PPI indicated, bowel protocol as needed

## 2019-06-04 NOTE — ASSESSMENT & PLAN NOTE
This is chronic,   monitor with Accu-Cheks and cover with insulin sliding scale.  Adjusting Lantus in the meantime with hyperglycemia from steroids

## 2019-06-04 NOTE — ASSESSMENT & PLAN NOTE
Last echocardiogram showed a preserved left ventricular systolic function with an ejection fraction of 60% and grade 2 diastolic dysfunction.    BNP elevated  Changed from oral to IV Lasix

## 2019-06-04 NOTE — ED PROVIDER NOTES
"ED Provider Note    Scribed for Tree Caballero M.D. by Cory Ayon. 6/3/2019, 9:12 PM.    Primary care provider: Kavitha Mccall M.D.  Means of arrival: Ambulance  History obtained from: Patient  History limited by: None     CHIEF COMPLAINT  Chief Complaint   Patient presents with   • Shortness of Breath     increased SOB x 1 week       HPI  Priya Callahan is a 68 y.o. female with a history of Congestive Heart Failure and COPD who presents to the Emergency Department by ambulance for evaluation of shortness of breath onset 4-5 days ago. The patient notes she typically requires 3.5 - 4 L of supplemental oxygen at baseline, but it has not provided any alleviation of her shortness of breath. She reports associated leg swelling, subjective fever, and nausea. The patient additionally endorse a productive cough with thick beige sputum and has associated non radiating central chest pain described as \"pressure\" in quality. She states she has had occasional blood in her sputum, but believes this is due to the excessive coughing. The patient also has complaints of constipation and reports her last bowel movement was 4-5 days ago. Prior to onset of all symptoms, she notes she has been exerting herself more than usual as she is in the process of moving to an assisted living facility. The patient reports being hospitalized in March 2019 for similar symptoms without leg swelling, and required IV antibiotics and Lasix during that admission. Patient has no history of myocardial infarctions or stents placed. The patient notes she has not missed any of her regularly medications. She reports to have hardware in her left foot. The patient denies chills, diaphoresis, or dysuria.    After further discussion with patient, she notes rectal bleeding ond 3 days ago that was bright red, but attributed that to a hemorrhoid at that time.     REVIEW OF SYSTEMS  Pertinent positives include shortness of breath, leg swelling, " productive cough with thick beige mucous, subjective fever, non-radiating chest pressure, nausea, diaphoresis, hematochezia, and constipation. Pertinent negatives include chills, diaphoresis, or dysuria. All other systems negative.    PAST MEDICAL HISTORY   has a past medical history of Arthritis; ASTHMA; Backpain; Breath shortness; Bronchitis; Congestive heart failure (HCC) (2013); COPD; Diabetes; Disorder of thyroid; Fall; Fibromyalgia; GERD (gastroesophageal reflux disease); Heart burn; Hepatitis C; Hypertension; Indigestion; Infectious disease; Neuropathy (HCC); On home oxygen therapy; Other specified symptom associated with female genital organs; Pain (9/13/2016); PND (post-nasal drip); Pneumonia; Psychiatric problem (9/13/2016); RLS (restless legs syndrome); Sleep apnea; and Unspecified urinary incontinence.    SURGICAL HISTORY   has a past surgical history that includes gyn surgery; other orthopedic surgery; other orthopedic surgery; us-needle core bx-breast panel; lumpectomy (Left); inj dx/ther agnt paravert facet joint, rbuce* (Left, 7/10/2015); inj dx/ther agnt paravert facet joint, bruce* (7/10/2015); inj dx/ther agnt paravert facet joint, bruce* (7/10/2015); inject nerv blck,othr periph nerv (7/10/2015); inj dx/ther agnt paravert facet joint, bruce* (Left, 7/17/2015); inj dx/ther agnt paravert facet joint, bruce* (7/17/2015); inj dx/ther agnt paravert facet joint, bruce* (7/17/2015); inject nerv blck,othr periph nerv (7/17/2015); dstr nrolytc agnt parverteb fct sngl lmbr/sacral (Left, 10/2/2015); dstr nrolytc agnt parverteb fct addl lmbr/sacral (10/2/2015); inject rx other periph nerve (10/2/2015); dstr nrolytc agnt parverteb fct addl lmbr/sacral (10/2/2015); dstr nrolytc agnt parverteb fct sngl lmbr/sacral (Right, 11/6/2015); dstr nrolytc agnt parverteb fct addl lmbr/sacral (11/6/2015); inject rx other periph nerve (11/6/2015); dstr nrolytc agnt parverteb fct addl lmbr/sacral (11/6/2015); dstr nrolytc agnt parverteb fct  "sngl lmbr/sacral (Left, 9/16/2016); dstr nrolytc agnt parverteb fct addl lmbr/sacral (9/16/2016); dstr nrolytc agnt parverteb fct addl lmbr/sacral (9/16/2016); fluoroscopic guidance needle placement (9/16/2016); and ankle orif (Left, 5/8/2017).    SOCIAL HISTORY  Social History   Substance Use Topics   • Smoking status: Former Smoker     Packs/day: 1.00     Years: 50.00     Types: Cigarettes     Quit date: 3/20/2019   • Smokeless tobacco: Never Used   • Alcohol use No      History   Drug Use No       FAMILY HISTORY  Family History   Problem Relation Age of Onset   • Lung Disease Mother    • Alcohol/Drug Mother    • Heart Disease Mother    • Cancer Father    • Cancer Brother    • Diabetes Brother    • Diabetes Maternal Grandmother    • Stroke Paternal Grandmother    • Heart Disease Paternal Grandmother        CURRENT MEDICATIONS  Home Medications     Reviewed by Rhea Esparza (Pharmacy Tech) on 06/03/19 at 2309  Med List Status: Complete   Medication Last Dose Status   albuterol 108 (90 Base) MCG/ACT Aero Soln inhalation aerosol 6/2/2019 Active   amitriptyline (ELAVIL) 50 MG Tab 6/2/2019 Active   amLODIPine (NORVASC) 10 MG Tab 6/2/2019 Active   cyclobenzaprine (FLEXERIL) 10 MG Tab 6/2/2019 Active   DULoxetine (CYMBALTA) 60 MG Cap DR Particles delayed-release capsule 6/3/2019 Active   Fluticasone-Umeclidin-Vilant (TRELEGY ELLIPTA) 100-62.5-25 MCG/INH AEROSOL POWDER, BREATH ACTIVATED 6/3/2019 Active   furosemide (LASIX) 20 MG Tab 6/3/2019 Active   insulin glargine (LANTUS) 100 UNIT/ML Solution 6/2/2019 Active   levothyroxine (SYNTHROID) 75 MCG Tab 6/3/2019 Active   oxyCODONE immediate release (ROXICODONE) 10 MG immediate release tablet 6/3/2019 Active   rosuvastatin (CRESTOR) 10 MG Tab 6/3/2019 Active   traZODone (DESYREL) 150 MG Tab 6/2/2019 Active                ALLERGIES  Allergies   Allergen Reactions   • Vitamin C Diarrhea     \"violent diarrhea\"   • Codeine Nausea     Severe stomach pain   • Gabapentin " "Palpitations     Gets shaky and falls    • Keflex Rash     Severe rash, but TOLERATES PENICILLINS, Rocephin    • Lyrica Nausea     Nausea, dizzy   • Sudafed Nausea and Unspecified     Chest pain, nausea       PHYSICAL EXAM  VITAL SIGNS: /54   Pulse 87   Temp 36.6 °C (97.9 °F) (Tympanic)   Resp 18   Ht 1.651 m (5' 5\")   Wt 113 kg (249 lb 1.9 oz)   SpO2 (!) 85%   BMI 41.46 kg/m²     Constitutional: Alert in mild distress.   HENT: Normocephalic, Atraumatic  Eyes: Conjunctiva normal, No discharge.   Cardiovascular: Normal heart rate, Normal rhythm, No murmurs, equal pulses.   Pulmonary: Bilateral wheezing with poor air movement, mild respiratory distress, no rales, No rhonchi.  Chest: No chest wall tenderness or deformity.   Abdomen: Obese, soft, No tenderness, No masses, no rebound, no guarding.   Rectal:  Brown stool, GUAIAC positive, no hemrrohoids seen or felt  Back: Right CVA tenderness.   Musculoskeletal: 1+ pitting edema up to the knees, no calf pain, no calf tenderness, chords appear symmetric. No major deformities noted  Skin: Warm, Dry, No erythema, No rash.   Neurologic: Alert & oriented x 3, Normal motor function,  No focal deficits noted.   Psychiatric: Affect normal, Judgment normal, Mood normal.       LABS  Results for orders placed or performed during the hospital encounter of 06/03/19   CBC WITH DIFFERENTIAL   Result Value Ref Range    WBC 12.8 (H) 4.8 - 10.8 K/uL    RBC 2.09 (L) 4.20 - 5.40 M/uL    Hemoglobin 6.0 (L) 12.0 - 16.0 g/dL    Hematocrit 19.9 (L) 37.0 - 47.0 %    MCV 95.2 81.4 - 97.8 fL    MCH 28.7 27.0 - 33.0 pg    MCHC 30.2 (L) 33.6 - 35.0 g/dL    RDW 55.8 (H) 35.9 - 50.0 fL    Platelet Count 235 164 - 446 K/uL    MPV 9.2 9.0 - 12.9 fL    Neutrophils-Polys 79.90 (H) 44.00 - 72.00 %    Lymphocytes 8.90 (L) 22.00 - 41.00 %    Monocytes 9.00 0.00 - 13.40 %    Eosinophils 0.50 0.00 - 6.90 %    Basophils 0.20 0.00 - 1.80 %    Immature Granulocytes 1.50 (H) 0.00 - 0.90 %    Nucleated " RBC 0.40 /100 WBC    Neutrophils (Absolute) 10.26 (H) 2.00 - 7.15 K/uL    Lymphs (Absolute) 1.14 1.00 - 4.80 K/uL    Monos (Absolute) 1.15 (H) 0.00 - 0.85 K/uL    Eos (Absolute) 0.06 0.00 - 0.51 K/uL    Baso (Absolute) 0.02 0.00 - 0.12 K/uL    Immature Granulocytes (abs) 0.19 (H) 0.00 - 0.11 K/uL    NRBC (Absolute) 0.05 K/uL   COMP METABOLIC PANEL   Result Value Ref Range    Sodium 137 135 - 145 mmol/L    Potassium 4.3 3.6 - 5.5 mmol/L    Chloride 102 96 - 112 mmol/L    Co2 26 20 - 33 mmol/L    Anion Gap 9.0 0.0 - 11.9    Glucose 204 (H) 65 - 99 mg/dL    Bun 31 (H) 8 - 22 mg/dL    Creatinine 1.99 (H) 0.50 - 1.40 mg/dL    Calcium 8.2 (L) 8.5 - 10.5 mg/dL    AST(SGOT) 24 12 - 45 U/L    ALT(SGPT) 19 2 - 50 U/L    Alkaline Phosphatase 43 30 - 99 U/L    Total Bilirubin 0.5 0.1 - 1.5 mg/dL    Albumin 3.1 (L) 3.2 - 4.9 g/dL    Total Protein 5.5 (L) 6.0 - 8.2 g/dL    Globulin 2.4 1.9 - 3.5 g/dL    A-G Ratio 1.3 g/dL   TROPONIN   Result Value Ref Range    Troponin I 0.04 0.00 - 0.04 ng/mL   LACTIC ACID   Result Value Ref Range    Lactic Acid 1.2 0.5 - 2.0 mmol/L   BTYPE NATRIURETIC PEPTIDE   Result Value Ref Range    B Natriuretic Peptide 255 (H) 0 - 100 pg/mL   COD (Adult) - Type and Crossmatch only order if transfusing RBC'S   Result Value Ref Range    ABO Grouping Only O     Rh Grouping Only POS     Antibody Screen-Cod NEG     Component R       R3                  Red Blood Cells3    [id number]   transfused   06/03/19   23:15      Product Type Red Blood Cells LR Pheresis     Dispense Status transfused     Unit Number (Barcoded) [id number]     Product Code (Barcoded) J6085G41     Blood Type (Barcoded) 1513    ESTIMATED GFR   Result Value Ref Range    GFR If African American 30 (A) >60 mL/min/1.73 m 2    GFR If Non African American 25 (A) >60 mL/min/1.73 m 2   Basic Metabolic Panel (BMP)   Result Value Ref Range    Sodium 136 135 - 145 mmol/L    Potassium 4.7 3.6 - 5.5 mmol/L    Chloride 102 96 - 112 mmol/L    Co2 26  20 - 33 mmol/L    Glucose 340 (H) 65 - 99 mg/dL    Bun 30 (H) 8 - 22 mg/dL    Creatinine 1.86 (H) 0.50 - 1.40 mg/dL    Calcium 8.3 (L) 8.5 - 10.5 mg/dL    Anion Gap 8.0 0.0 - 11.9   CBC without Differential   Result Value Ref Range    WBC 12.7 (H) 4.8 - 10.8 K/uL    RBC 2.59 (L) 4.20 - 5.40 M/uL    Hemoglobin 7.0 (L) 12.0 - 16.0 g/dL    Hematocrit 23.8 (L) 37.0 - 47.0 %    MCV 91.9 81.4 - 97.8 fL    MCH 27.0 27.0 - 33.0 pg    MCHC 29.4 (L) 33.6 - 35.0 g/dL    RDW 63.6 (H) 35.9 - 50.0 fL    Platelet Count 236 164 - 446 K/uL    MPV 9.3 9.0 - 12.9 fL   ESTIMATED GFR   Result Value Ref Range    GFR If  32 (A) >60 mL/min/1.73 m 2    GFR If Non  27 (A) >60 mL/min/1.73 m 2   ACCU-CHEK GLUCOSE   Result Value Ref Range    Glucose - Accu-Ck 314 (H) 65 - 99 mg/dL   EKG   Result Value Ref Range    Report       [facility]    Test Date:  2019  Pt Name:    CHAITANYA CABELLO            Department: ER  MRN:        [id number]                      Room:        10  Gender:     Female                       Technician: [id number]  :        [id number]                   Requested By:ER TRIAGE PROTOCOL  Order #:    [id number]                    Reading MD: JALEN ADAMES MD    Measurements  Intervals                                Axis  Rate:       70                           P:          61  NC:         167                          QRS:        29  QRSD:       86                           T:          115  QT:         388  QTc:        419    Interpretive Statements  SINUS RHYTHM  ATRIAL PREMATURE COMPLEX  LEFT ATRIAL ABNORMALITY  ABNORMAL T, CONSIDER ISCHEMIA, LATERAL LEADS  Compared to ECG 2019 06:44:34  Atrial premature complex(es) now present  Atrial abnormality now present  T-wave abnormality now present  Possible ischemia now present    Electronicall y Signed On 6-3-2019 22:15:17 PDT by JALEN ADAMES MD       All labs reviewed by me.    EKG  12  Lead EKG interpreted by me as shown above.    RADIOLOGY  DX-CHEST-PORTABLE (1 VIEW)   Final Result      Hazy bibasilar opacities, possibly related to overlying soft tissues. Overlying atelectasis or early pneumonia cannot be excluded.        The radiologist's interpretation of all radiological studies have been reviewed by me.    COURSE & MEDICAL DECISION MAKING  Pertinent Labs & Imaging studies reviewed. (See chart for details)    9:12 PM - Patient seen and examined at bedside. Patient will be treated with Duoneb and Solumedrol 125 mg injection. Ordered DX-chest, Troponin, BNP, Lactic acid, Blood culture x2, CBC with differential, CMP, and EKG to evaluate her symptoms. The differential diagnoses include but are not limited to: COPD exacerbation, pneumonia, CHF, and Myocardial infarction    9:53 PM - Patient reevaluated at bedside. She is resting comfortably. The patient was updated with lab results that indicate a low hemoglboin, which is likely causing her shortness of breath. She was informed I will need to perform a rectal exam. The patient consents to rectal exam. She notes rectal bleeding 3 days ago that was bright red, but attributed that to a hemorrhoid at that time.     I have explained to the patient the risks and benefits of transfusion of blood products.  This includes, as appropriate, the risk of mild allergic reaction, hemolytic reaction, transfusion-associated lung injury, febrile reactions, circulatory or iron overload, and infection.    We discussed possible alternatives and their risks, including directed donation, autologous transfusion, and no transfusion, including IV or oral iron supplementation, as appropriate.  I believe the patient understands the risks and benefits and was able to express understanding.    10: 54 PM - I paged Dr. Quiñones (Hospitalist).    10: 56 PM - I discussed the patient's case and the above findings with Dr. Quiñones (Hospitalist) who agrees to admit and transfer care of  patient.     CRITICAL CARE  I provided critical care services, which included medication orders, frequent reevaluations of the patient's condition and response to treatment, ordering and reviewing test results, and discussing the case with various consultants.  The critical care time associated with the care of the patient was 35 minutes. Review chart for interventions. This time is exclusive of any other billable procedures.       Medical Decision Making: At this point time I think the patient is exact experiencing a combination of COPD exacerbation along with acute anemia, on top of a pneumonia increase in the patient's shortness of breath.  I think the patient would benefit from multiple breathing treatments, IV antibiotics and blood.  I think the patient's anemia secondary to a GI bleed.  She did not have any bright red blood on rectal exam therefore CT of the abdomen was not done.    DISPOSITION:  Patient will be admitted to Dr. Quiñones (Hospitalist) in guarded condition.       FINAL IMPRESSION  1. SOB (shortness of breath)    2. Pneumonia of both lower lobes due to infectious organism (HCC)    3. Acute anemia    4. Hypoxia    5. Acute exacerbation of chronic obstructive pulmonary disease (COPD) (HCC)      The critical care time associated with the care of the patient was 35 minutes. Review chart for interventions. This time is exclusive of any other billable procedures.      Cory CLAY (Alla), am scribing for, and in the presence of, Tree Caballero M.D.    Electronically signed by: Cory Ayon (Alla), 6/3/2019    Tree CLAY M.D. personally performed the services described in this documentation, as scribed by Cory Ayon in my presence, and it is both accurate and complete.    C    The note accurately reflects work and decisions made by me.  Tree Caballero  6/4/2019  4:14 AM

## 2019-06-04 NOTE — RESPIRATORY CARE
"  COPD EDUCATION by COPD CLINICAL EDUCATOR  (Phone: [phone])  6/4/2019 at 10:11 AM by Idalia Lutz     Patient was interviewed by Respiratory Education team for COPD program. Patient refused full COPD program. We have seen her many times. She is an established [facility] patient that utilizes Tele-medcine for her appointments. Today we reviewed her new medicine: Trelegy Daily. Encouraged her to consider using this once -daily combination-inhaled medicine when her breathing is more controlled. She had been using first thing in am. She awakes with phlegm and coughing. She states she has quit all inhaled substances since last admission! Congratulations to her continued success. She has our packet nformation packet about lung disease, its treatments and \"Stop Smoking\" .gave her our contact information for any further questions.    "

## 2019-06-04 NOTE — PROGRESS NOTES
2 RN skin check complete w/Cadie.   Devices in place SCDs, silicone oxygen tubing.  Skin assessed under devices, silicone oxygen tubing.  Confirmed pressure ulcers found on N/A.  New potential pressure ulcers noted on N/A. Wound consult placed yes.  The following interventions in place patient turns self from side to side, pillows in use for support and repositioning, pillows in use to float heels, waffle cushion in use, interdry placed beneath breasts and pannus, moisturizer provided.    Ears are pink, intact, blanching.  Elbows are pink, intact, blanching. Bruising throughout bilateral upper extremities.   Breasts are red and moist. Placed interdry.  Pannus is pink and moist, placed interdry.  Sacrum is pink, intact, blanching.   RLE has 1+ edema with bruising and scarring.  LLE has 3+ edema with 1+ swelling.  Bilateral heels are red, intact, blanching.

## 2019-06-04 NOTE — ASSESSMENT & PLAN NOTE
This is chronic, blood pressure currently controlled on home Norvasc.  Okay to continue with holding parameters.

## 2019-06-04 NOTE — WOUND TEAM
In to see patient for wound consult of under bilateral breast and pannus.  All areas assessed.  Mild redness noted under pannus and left breast, interdry in place.  Patient states she reacts with skin rash to powders.  No open areas or indication of yeast at this time.  Continue to cleanse area daily with warm wash cloth soap and water and apply interdry.  No advanced wound care needs at this time.

## 2019-06-04 NOTE — ASSESSMENT & PLAN NOTE
Patient has a known history of oxygen dependent COPD.  He is typically on 4 L at baseline but is currently requiring 6 L to maintain saturation greater than 88%.    Chest x-ray shows bibasilar opacities which could be early pneumonia.    Empiric antibiotics

## 2019-06-04 NOTE — ED NOTES
Med rec updated and complete. Allergies reviewed. Met with pt  And dicussed current medications and last doses taken.  Pt denies oral antibiotic use in last 30 days.

## 2019-06-04 NOTE — DIETARY
NUTRITION SERVICES: BMI - Pt with BMI >40 (=Body mass index is 40.21 kg/m².), class III (extreme) obesity. Weight loss counseling not appropriate in acute care setting. RECOMMEND - Referral to outpatient nutrition services for weight management after D/C.

## 2019-06-04 NOTE — ASSESSMENT & PLAN NOTE
Likely due to chronic opioid use.  If no improvement with bowel protocol, consider Relistor.  Per patient, only lactulose works so this was ordered

## 2019-06-04 NOTE — PROGRESS NOTES
Paged Dr. Quiñones regarding updated hemoglobin. Dr. Quiñones states not to transfuse at this time.

## 2019-06-05 ENCOUNTER — APPOINTMENT (OUTPATIENT)
Dept: RADIOLOGY | Facility: MEDICAL CENTER | Age: 69
DRG: 192 | End: 2019-06-05
Attending: HOSPITALIST
Payer: MEDICARE

## 2019-06-05 ENCOUNTER — APPOINTMENT (OUTPATIENT)
Dept: CARDIOLOGY | Facility: MEDICAL CENTER | Age: 69
DRG: 192 | End: 2019-06-05
Attending: HOSPITALIST
Payer: MEDICARE

## 2019-06-05 LAB
ALBUMIN SERPL BCP-MCNC: 3.6 G/DL (ref 3.2–4.9)
BNP SERPL-MCNC: 561 PG/ML (ref 0–100)
BUN SERPL-MCNC: 40 MG/DL (ref 8–22)
CALCIUM SERPL-MCNC: 8.9 MG/DL (ref 8.5–10.5)
CHLORIDE SERPL-SCNC: 97 MMOL/L (ref 96–112)
CO2 SERPL-SCNC: 25 MMOL/L (ref 20–33)
CREAT SERPL-MCNC: 2.13 MG/DL (ref 0.5–1.4)
ERYTHROCYTE [DISTWIDTH] IN BLOOD BY AUTOMATED COUNT: 60.2 FL (ref 35.9–50)
GLUCOSE BLD-MCNC: 234 MG/DL (ref 65–99)
GLUCOSE BLD-MCNC: 235 MG/DL (ref 65–99)
GLUCOSE BLD-MCNC: 296 MG/DL (ref 65–99)
GLUCOSE BLD-MCNC: 371 MG/DL (ref 65–99)
GLUCOSE SERPL-MCNC: 266 MG/DL (ref 65–99)
HCT VFR BLD AUTO: 26.9 % (ref 37–47)
HGB BLD-MCNC: 7.9 G/DL (ref 12–16)
HGB BLD-MCNC: 8.1 G/DL (ref 12–16)
HGB BLD-MCNC: 8.5 G/DL (ref 12–16)
LV EJECT FRACT  99904: 60
LV EJECT FRACT MOD 2C 99903: 55.25
LV EJECT FRACT MOD 4C 99902: 69.68
LV EJECT FRACT MOD BP 99901: 62.7
MCH RBC QN AUTO: 28.1 PG (ref 27–33)
MCHC RBC AUTO-ENTMCNC: 31.7 G/DL (ref 33.6–35)
MCV RBC AUTO: 88.4 FL (ref 81.4–97.8)
PHOSPHATE SERPL-MCNC: 3.9 MG/DL (ref 2.5–4.5)
PLATELET # BLD AUTO: 263 K/UL (ref 164–446)
PMV BLD AUTO: 9.1 FL (ref 9–12.9)
POTASSIUM SERPL-SCNC: 4.6 MMOL/L (ref 3.6–5.5)
PROCALCITONIN SERPL-MCNC: 0.61 NG/ML
RBC # BLD AUTO: 3.03 M/UL (ref 4.2–5.4)
SODIUM SERPL-SCNC: 134 MMOL/L (ref 135–145)
WBC # BLD AUTO: 22.2 K/UL (ref 4.8–10.8)

## 2019-06-05 PROCEDURE — 770020 HCHG ROOM/CARE - TELE (206)

## 2019-06-05 PROCEDURE — 700102 HCHG RX REV CODE 250 W/ 637 OVERRIDE(OP): Performed by: HOSPITALIST

## 2019-06-05 PROCEDURE — 700105 HCHG RX REV CODE 258: Performed by: HOSPITALIST

## 2019-06-05 PROCEDURE — A9270 NON-COVERED ITEM OR SERVICE: HCPCS | Performed by: HOSPITALIST

## 2019-06-05 PROCEDURE — 700101 HCHG RX REV CODE 250: Performed by: HOSPITALIST

## 2019-06-05 PROCEDURE — 700111 HCHG RX REV CODE 636 W/ 250 OVERRIDE (IP): Performed by: HOSPITALIST

## 2019-06-05 PROCEDURE — 36415 COLL VENOUS BLD VENIPUNCTURE: CPT

## 2019-06-05 PROCEDURE — 73600 X-RAY EXAM OF ANKLE: CPT | Mod: LT

## 2019-06-05 PROCEDURE — 94640 AIRWAY INHALATION TREATMENT: CPT

## 2019-06-05 PROCEDURE — 99232 SBSQ HOSP IP/OBS MODERATE 35: CPT | Performed by: HOSPITALIST

## 2019-06-05 PROCEDURE — 82962 GLUCOSE BLOOD TEST: CPT | Mod: 91

## 2019-06-05 PROCEDURE — 93306 TTE W/DOPPLER COMPLETE: CPT

## 2019-06-05 PROCEDURE — 85018 HEMOGLOBIN: CPT | Mod: 91

## 2019-06-05 PROCEDURE — 93306 TTE W/DOPPLER COMPLETE: CPT | Mod: 26 | Performed by: INTERNAL MEDICINE

## 2019-06-05 RX ORDER — INSULIN GLARGINE 100 [IU]/ML
35 INJECTION, SOLUTION SUBCUTANEOUS NIGHTLY
Status: DISCONTINUED | OUTPATIENT
Start: 2019-06-05 | End: 2019-06-08 | Stop reason: HOSPADM

## 2019-06-05 RX ORDER — FUROSEMIDE 10 MG/ML
40 INJECTION INTRAMUSCULAR; INTRAVENOUS
Status: DISCONTINUED | OUTPATIENT
Start: 2019-06-05 | End: 2019-06-07

## 2019-06-05 RX ADMIN — AMITRIPTYLINE HYDROCHLORIDE 50 MG: 50 TABLET, FILM COATED ORAL at 21:00

## 2019-06-05 RX ADMIN — FUROSEMIDE 20 MG: 20 TABLET ORAL at 05:54

## 2019-06-05 RX ADMIN — TRAZODONE HYDROCHLORIDE 150 MG: 50 TABLET ORAL at 21:00

## 2019-06-05 RX ADMIN — PREDNISONE 40 MG: 20 TABLET ORAL at 05:54

## 2019-06-05 RX ADMIN — IPRATROPIUM BROMIDE AND ALBUTEROL SULFATE 3 ML: .5; 3 SOLUTION RESPIRATORY (INHALATION) at 09:25

## 2019-06-05 RX ADMIN — AMPICILLIN SODIUM AND SULBACTAM SODIUM 3 G: 2; 1 INJECTION, POWDER, FOR SOLUTION INTRAMUSCULAR; INTRAVENOUS at 00:00

## 2019-06-05 RX ADMIN — IPRATROPIUM BROMIDE AND ALBUTEROL SULFATE 3 ML: .5; 3 SOLUTION RESPIRATORY (INHALATION) at 03:56

## 2019-06-05 RX ADMIN — INSULIN GLARGINE 35 UNITS: 100 INJECTION, SOLUTION SUBCUTANEOUS at 21:06

## 2019-06-05 RX ADMIN — AZITHROMYCIN 250 MG: 250 TABLET, FILM COATED ORAL at 20:59

## 2019-06-05 RX ADMIN — SENNOSIDES AND DOCUSATE SODIUM 2 TABLET: 8.6; 5 TABLET ORAL at 05:52

## 2019-06-05 RX ADMIN — IPRATROPIUM BROMIDE AND ALBUTEROL SULFATE 3 ML: .5; 3 SOLUTION RESPIRATORY (INHALATION) at 17:14

## 2019-06-05 RX ADMIN — DULOXETINE HYDROCHLORIDE 60 MG: 60 CAPSULE, DELAYED RELEASE ORAL at 05:53

## 2019-06-05 RX ADMIN — SENNOSIDES AND DOCUSATE SODIUM 2 TABLET: 8.6; 5 TABLET ORAL at 17:14

## 2019-06-05 RX ADMIN — AMPICILLIN SODIUM AND SULBACTAM SODIUM 3 G: 2; 1 INJECTION, POWDER, FOR SOLUTION INTRAMUSCULAR; INTRAVENOUS at 17:15

## 2019-06-05 RX ADMIN — LEVOTHYROXINE SODIUM 75 MCG: 75 TABLET ORAL at 05:53

## 2019-06-05 RX ADMIN — UMECLIDINIUM BROMIDE AND VILANTEROL TRIFENATATE 1 PUFF: 62.5; 25 POWDER RESPIRATORY (INHALATION) at 07:01

## 2019-06-05 RX ADMIN — OXYCODONE HYDROCHLORIDE 10 MG: 10 TABLET ORAL at 13:28

## 2019-06-05 RX ADMIN — FLUTICASONE PROPIONATE 88 MCG: 44 AEROSOL, METERED RESPIRATORY (INHALATION) at 07:01

## 2019-06-05 RX ADMIN — INSULIN HUMAN 4 UNITS: 100 INJECTION, SOLUTION PARENTERAL at 21:05

## 2019-06-05 RX ADMIN — AMPICILLIN SODIUM AND SULBACTAM SODIUM 3 G: 2; 1 INJECTION, POWDER, FOR SOLUTION INTRAMUSCULAR; INTRAVENOUS at 11:45

## 2019-06-05 RX ADMIN — AMPICILLIN SODIUM AND SULBACTAM SODIUM 3 G: 2; 1 INJECTION, POWDER, FOR SOLUTION INTRAMUSCULAR; INTRAVENOUS at 05:52

## 2019-06-05 RX ADMIN — INSULIN HUMAN 7 UNITS: 100 INJECTION, SOLUTION PARENTERAL at 17:10

## 2019-06-05 RX ADMIN — AMLODIPINE BESYLATE 10 MG: 10 TABLET ORAL at 21:00

## 2019-06-05 RX ADMIN — IPRATROPIUM BROMIDE AND ALBUTEROL SULFATE 3 ML: .5; 3 SOLUTION RESPIRATORY (INHALATION) at 13:30

## 2019-06-05 RX ADMIN — OXYCODONE HYDROCHLORIDE 10 MG: 10 TABLET ORAL at 21:00

## 2019-06-05 RX ADMIN — CYCLOBENZAPRINE 10 MG: 10 TABLET, FILM COATED ORAL at 21:01

## 2019-06-05 RX ADMIN — INSULIN HUMAN 4 UNITS: 100 INJECTION, SOLUTION PARENTERAL at 06:05

## 2019-06-05 RX ADMIN — INSULIN HUMAN 12 UNITS: 100 INJECTION, SOLUTION PARENTERAL at 11:53

## 2019-06-05 RX ADMIN — FUROSEMIDE 40 MG: 10 INJECTION, SOLUTION INTRAVENOUS at 11:45

## 2019-06-05 ASSESSMENT — ENCOUNTER SYMPTOMS
BLOOD IN STOOL: 0
SHORTNESS OF BREATH: 0
NAUSEA: 0
COUGH: 0
FEVER: 0
NERVOUS/ANXIOUS: 1
VOMITING: 0
CHILLS: 0
CONSTIPATION: 0
HEADACHES: 0
DIARRHEA: 0

## 2019-06-05 NOTE — CARE PLAN
Problem: Safety  Goal: Will remain free from injury    Intervention: Provide assistance with mobility  Mobility assessed. Patient needs a one person assist with a FWW related to generalized weakness and moderate - severe dyspnea with exertion. Patient educated on use of call light, patient verbalizes understanding and calls appropriately. Bed in lowest locked position. Non-skid socks on. Bed alarm in use. Upper bed rails raised. Fall risk precautions in place.       Problem: Bowel/Gastric:  Goal: Will not experience complications related to bowel motility    Intervention: Assess baseline bowel pattern  Bowel sounds active in all four quadrants. Patient tolerating meals well and consuming %. Patient having bowel movements per baseline and taking stool softeners per request.

## 2019-06-05 NOTE — ASSESSMENT & PLAN NOTE
Left leg greater than right leg  BNP elevated  Discussed with ortho Uzair and ordered ankle x-ray which showed no issues with the previous hardware

## 2019-06-05 NOTE — CARE PLAN
Problem: Pain Management  Goal: Pain level will decrease to patient's comfort goal  Outcome: PROGRESSING AS EXPECTED      Problem: Respiratory:  Goal: Respiratory status will improve  Outcome: PROGRESSING AS EXPECTED      Problem: Fluid Volume:  Goal: Will maintain balanced intake and output  Outcome: PROGRESSING AS EXPECTED      Problem: Communication  Goal: The ability to communicate needs accurately and effectively will improve  Outcome: PROGRESSING AS EXPECTED

## 2019-06-05 NOTE — PROGRESS NOTES
Hospital Medicine Daily Progress Note    Date of Service  6/4/2019    Chief Complaint  68 y.o. female admitted 6/3/2019 with shortness of breath, leg swelling, anemia    Interval Problem Update  6/4: Hemoglobin trended down to 6.6.  Transfusing 1 unit RBCs.  Patient also having edema of the left leg worse than right.  Having constipation and says only lactulose works for her, ordered as needed.  Patient is critically ill.   The patient continues to have: Anemia requiring transfusion  The vital organ system that is affected is the: Circulation  If untreated there is a high chance of deterioration into: Shock  And eventually death.   The critical care that I am providing today is: Transfusion  The critical that has been undertaken is medically complex.   There has been no overlap in critical care time.   Critical Care Time not including procedures: 33 mins    Disposition  Patient plans to go to assisted living.  QuantiFERON gold pending    Review of Systems  Review of Systems   Constitutional: Negative for chills and fever.   Respiratory: Negative for cough, shortness of breath and wheezing.    Cardiovascular: Positive for leg swelling. Negative for chest pain.   Gastrointestinal: Negative for blood in stool, constipation, diarrhea, melena, nausea and vomiting.   Genitourinary: Negative for dysuria.   Neurological: Negative for headaches.        Physical Exam  Temp:  [35.9 °C (96.7 °F)-37 °C (98.6 °F)] 36.6 °C (97.9 °F)  Pulse:  [62-94] 94  Resp:  [15-22] 18  BP: (101-142)/(41-72) 111/52  SpO2:  [85 %-100 %] 95 %    Physical Exam   Constitutional: She appears well-developed.   HENT:   Head: Normocephalic.   Eyes: Conjunctivae are normal.   Cardiovascular: Normal rate.  Exam reveals no gallop.    Pulmonary/Chest: No respiratory distress. She has no wheezes.   Abdominal: She exhibits no distension. There is no tenderness.   Musculoskeletal: She exhibits edema (L > R ).   Neurological: She is alert.        Fluids    Intake/Output Summary (Last 24 hours) at 06/04/19 1800  Last data filed at 06/04/19 1730   Gross per 24 hour   Intake           468.75 ml   Output                0 ml   Net           468.75 ml       Laboratory  Recent Labs      06/03/19 2124 06/04/19   0252  06/04/19   0738  06/04/19   1511   WBC  12.8*  12.7*   --    --    RBC  2.09*  2.59*   --    --    HEMOGLOBIN  6.0*  7.0*  7.1*  6.6*   HEMATOCRIT  19.9*  23.8*   --    --    MCV  95.2  91.9   --    --    MCH  28.7  27.0   --    --    MCHC  30.2*  29.4*   --    --    RDW  55.8*  63.6*   --    --    PLATELETCT  235  236   --    --    MPV  9.2  9.3   --    --      Recent Labs      06/03/19 2124 06/04/19 0252   SODIUM  137  136   POTASSIUM  4.3  4.7   CHLORIDE  102  102   CO2  26  26   GLUCOSE  204*  340*   BUN  31*  30*   CREATININE  1.99*  1.86*   CALCIUM  8.2*  8.3*         Recent Labs      06/03/19 2124   BNPBTYPENAT  255*           Imaging  DX-CHEST-PORTABLE (1 VIEW)   Final Result      Hazy bibasilar opacities, possibly related to overlying soft tissues. Overlying atelectasis or early pneumonia cannot be excluded.           Assessment/Plan  * Acute exacerbation of chronic obstructive pulmonary disease (COPD) (HCC)- (present on admission)   Assessment & Plan    Patient has a known history of oxygen dependent COPD.  He is typically on 4 L at baseline but is currently requiring 6 L to maintain saturation greater than 88%.  Chest x-ray shows bibasilar opacities which could be early pneumonia.  She does have leukocytosis but is otherwise afebrile with no lactic acidosis and stable vital signs.  At this point, she will be admitted to the hospital for continued treatment.  I will check a procalcitonin level, sputum sample, and start on empiric and biotics with IV unit Theramycin.  Pending the resultsof the procalcitonin level and sputum sample, and we can begin to de-escalate or discontinue antibiotics.  She will be placed on respiratory  therapy protocol, I will start her on steroids orally and will assess for improvement.  No evidence of volume overload at present but will avoid excessive IV saline.     Anemia requiring transfusions   Assessment & Plan    Patient has not had a colonoscopy in over 5 years.  She denies any NSAID use.  She has not had any active bleeding in the emergency room.  Occult stool was positive and she will be admitted and monitored.  I am holding off on additional fluids for now but I will continue to trend hemoglobin levels and will plan to discuss this patient with GI in the morning.     Chronic hypertension- (present on admission)   Assessment & Plan    This is chronic, blood pressure currently controlled on home Norvasc.  Okay to continue with holding parameters.     Diastolic heart failure (HCC)   Assessment & Plan    Last echocardiogram showed a preserved left ventricular systolic function with an ejection fraction of 60% and grade 2 diastolic dysfunction.  She shows no evidence of volume overload at present, continue home Lasix.     Type 2 diabetes mellitus with hyperglycemia, and peripheral neuropathy, with long-term current use of insulin ([facility])- (present on admission)   Assessment & Plan    This is chronic, continue home Lantus, monitor with Accu-Cheks and cover with insulin sliding scale.     Chronic constipation- (present on admission)   Assessment & Plan    Likely due to chronic opioid use.  If no improvement with bowel protocol, consider Relistor.     Hypothyroidism- (present on admission)   Assessment & Plan    This is chronic and stable, continue home Synthroid.     Chronic kidney disease, stage 3 (HCC)- (present on admission)   Assessment & Plan    Patient's renal function is at baseline, monitor.          VTE prophylaxis: No anticoagulation given anemia requiring transfusion

## 2019-06-05 NOTE — PROGRESS NOTES
Received report from NOC RN. Assumed care of patient at 0700. Patient A&Ox4. On 5L NC, continuous pulse oximeter at bedside. Patient states 4/10 generalized pain, denies intervention at this time, requesting rest. POC discussed and agreed upon with patient. Call light and belongings within reach. Bed in lowest locked position. Upper side rails raised. Bed alarm on. Hourly rounding in place. Will continue to monitor.

## 2019-06-06 LAB
ALBUMIN SERPL BCP-MCNC: 3.3 G/DL (ref 3.2–4.9)
BUN SERPL-MCNC: 45 MG/DL (ref 8–22)
CALCIUM SERPL-MCNC: 8.4 MG/DL (ref 8.5–10.5)
CHLORIDE SERPL-SCNC: 99 MMOL/L (ref 96–112)
CO2 SERPL-SCNC: 30 MMOL/L (ref 20–33)
CREAT SERPL-MCNC: 1.89 MG/DL (ref 0.5–1.4)
ERYTHROCYTE [DISTWIDTH] IN BLOOD BY AUTOMATED COUNT: 59.3 FL (ref 35.9–50)
GLUCOSE BLD-MCNC: 106 MG/DL (ref 65–99)
GLUCOSE BLD-MCNC: 219 MG/DL (ref 65–99)
GLUCOSE BLD-MCNC: 299 MG/DL (ref 65–99)
GLUCOSE BLD-MCNC: 330 MG/DL (ref 65–99)
GLUCOSE SERPL-MCNC: 151 MG/DL (ref 65–99)
HCT VFR BLD AUTO: 25.1 % (ref 37–47)
HGB BLD-MCNC: 7.6 G/DL (ref 12–16)
MCH RBC QN AUTO: 27.5 PG (ref 27–33)
MCHC RBC AUTO-ENTMCNC: 30.3 G/DL (ref 33.6–35)
MCV RBC AUTO: 90.9 FL (ref 81.4–97.8)
PHOSPHATE SERPL-MCNC: 4.3 MG/DL (ref 2.5–4.5)
PLATELET # BLD AUTO: 222 K/UL (ref 164–446)
PMV BLD AUTO: 9.2 FL (ref 9–12.9)
POTASSIUM SERPL-SCNC: 4.5 MMOL/L (ref 3.6–5.5)
RBC # BLD AUTO: 2.76 M/UL (ref 4.2–5.4)
SODIUM SERPL-SCNC: 138 MMOL/L (ref 135–145)
WBC # BLD AUTO: 16.4 K/UL (ref 4.8–10.8)

## 2019-06-06 PROCEDURE — 700111 HCHG RX REV CODE 636 W/ 250 OVERRIDE (IP): Performed by: HOSPITALIST

## 2019-06-06 PROCEDURE — 80069 RENAL FUNCTION PANEL: CPT

## 2019-06-06 PROCEDURE — 82962 GLUCOSE BLOOD TEST: CPT | Mod: 91

## 2019-06-06 PROCEDURE — 36415 COLL VENOUS BLD VENIPUNCTURE: CPT

## 2019-06-06 PROCEDURE — 94640 AIRWAY INHALATION TREATMENT: CPT

## 2019-06-06 PROCEDURE — 700102 HCHG RX REV CODE 250 W/ 637 OVERRIDE(OP): Performed by: HOSPITALIST

## 2019-06-06 PROCEDURE — 99232 SBSQ HOSP IP/OBS MODERATE 35: CPT | Performed by: HOSPITALIST

## 2019-06-06 PROCEDURE — 770020 HCHG ROOM/CARE - TELE (206)

## 2019-06-06 PROCEDURE — 85027 COMPLETE CBC AUTOMATED: CPT

## 2019-06-06 PROCEDURE — A9270 NON-COVERED ITEM OR SERVICE: HCPCS | Performed by: HOSPITALIST

## 2019-06-06 PROCEDURE — 700101 HCHG RX REV CODE 250: Performed by: HOSPITALIST

## 2019-06-06 RX ORDER — IPRATROPIUM BROMIDE AND ALBUTEROL SULFATE 2.5; .5 MG/3ML; MG/3ML
3 SOLUTION RESPIRATORY (INHALATION) 4 TIMES DAILY
Status: DISCONTINUED | OUTPATIENT
Start: 2019-06-06 | End: 2019-06-06

## 2019-06-06 RX ORDER — IPRATROPIUM BROMIDE AND ALBUTEROL SULFATE 2.5; .5 MG/3ML; MG/3ML
3 SOLUTION RESPIRATORY (INHALATION)
Status: DISCONTINUED | OUTPATIENT
Start: 2019-06-06 | End: 2019-06-08 | Stop reason: HOSPADM

## 2019-06-06 RX ADMIN — IPRATROPIUM BROMIDE AND ALBUTEROL SULFATE 3 ML: .5; 3 SOLUTION RESPIRATORY (INHALATION) at 15:37

## 2019-06-06 RX ADMIN — OXYCODONE HYDROCHLORIDE 10 MG: 10 TABLET ORAL at 18:26

## 2019-06-06 RX ADMIN — PREDNISONE 40 MG: 20 TABLET ORAL at 05:40

## 2019-06-06 RX ADMIN — INSULIN HUMAN 7 UNITS: 100 INJECTION, SOLUTION PARENTERAL at 21:08

## 2019-06-06 RX ADMIN — OXYCODONE HYDROCHLORIDE 10 MG: 10 TABLET ORAL at 02:24

## 2019-06-06 RX ADMIN — LEVOTHYROXINE SODIUM 75 MCG: 75 TABLET ORAL at 05:40

## 2019-06-06 RX ADMIN — SENNOSIDES AND DOCUSATE SODIUM 2 TABLET: 8.6; 5 TABLET ORAL at 05:39

## 2019-06-06 RX ADMIN — AMITRIPTYLINE HYDROCHLORIDE 50 MG: 50 TABLET, FILM COATED ORAL at 21:05

## 2019-06-06 RX ADMIN — DULOXETINE HYDROCHLORIDE 60 MG: 60 CAPSULE, DELAYED RELEASE ORAL at 05:39

## 2019-06-06 RX ADMIN — INSULIN HUMAN 10 UNITS: 100 INJECTION, SOLUTION PARENTERAL at 17:28

## 2019-06-06 RX ADMIN — CYCLOBENZAPRINE 10 MG: 10 TABLET, FILM COATED ORAL at 21:18

## 2019-06-06 RX ADMIN — TRAZODONE HYDROCHLORIDE 150 MG: 50 TABLET ORAL at 21:04

## 2019-06-06 RX ADMIN — ROSUVASTATIN CALCIUM 10 MG: 20 TABLET, FILM COATED ORAL at 05:39

## 2019-06-06 RX ADMIN — UMECLIDINIUM BROMIDE AND VILANTEROL TRIFENATATE 1 PUFF: 62.5; 25 POWDER RESPIRATORY (INHALATION) at 07:26

## 2019-06-06 RX ADMIN — AMLODIPINE BESYLATE 10 MG: 10 TABLET ORAL at 21:05

## 2019-06-06 RX ADMIN — OXYCODONE HYDROCHLORIDE 10 MG: 10 TABLET ORAL at 12:36

## 2019-06-06 RX ADMIN — IPRATROPIUM BROMIDE AND ALBUTEROL SULFATE 3 ML: .5; 3 SOLUTION RESPIRATORY (INHALATION) at 07:27

## 2019-06-06 RX ADMIN — OXYCODONE HYDROCHLORIDE 10 MG: 10 TABLET ORAL at 23:13

## 2019-06-06 RX ADMIN — FLUTICASONE PROPIONATE 88 MCG: 44 AEROSOL, METERED RESPIRATORY (INHALATION) at 07:27

## 2019-06-06 RX ADMIN — INSULIN HUMAN 4 UNITS: 100 INJECTION, SOLUTION PARENTERAL at 12:30

## 2019-06-06 RX ADMIN — FUROSEMIDE 40 MG: 10 INJECTION, SOLUTION INTRAVENOUS at 05:40

## 2019-06-06 RX ADMIN — INSULIN GLARGINE 35 UNITS: 100 INJECTION, SOLUTION SUBCUTANEOUS at 21:08

## 2019-06-06 RX ADMIN — IPRATROPIUM BROMIDE AND ALBUTEROL SULFATE 3 ML: .5; 3 SOLUTION RESPIRATORY (INHALATION) at 11:21

## 2019-06-06 ASSESSMENT — ENCOUNTER SYMPTOMS
BLOOD IN STOOL: 0
SHORTNESS OF BREATH: 0
HEADACHES: 0
FEVER: 0
NAUSEA: 0
VOMITING: 0
NERVOUS/ANXIOUS: 1
COUGH: 0
CHILLS: 0

## 2019-06-06 NOTE — FACE TO FACE
Face to Face Supporting Documentation - Home Health    The encounter with this patient was in whole or in part the primary reason for home health admission.    Date of encounter:   Patient:                    MRN:                       YOB: 2020  Priya Callahan  [id number]  1950     Home health to see patient for:  Skilled Nursing care for assessment, interventions & education    Skilled need for:  Exacerbation of Chronic Disease State anemia (needs outpt colonoscopy), edema / volume overload on lasix    Skilled nursing interventions to include:  Comment: chk on pt    Homebound status evidenced by:  Needs the assistance of another person in order to leave the home. Leaving home requires a considerable and taxing effort. There is a normal inability to leave the home.    Community Physician to provide follow up care: Kavitha Mccall M.D.     Optional Interventions? No      I certify the face to face encounter for this home health care referral meets the CMS requirements and the encounter/clinical assessment with the patient was, in whole, or in part, for the medical condition(s) listed above, which is the primary reason for home health care. Based on my clinical findings: the service(s) are medically necessary, support the need for home health care, and the homebound criteria are met.  I certify that this patient has had a face to face encounter by myself.  Mannie Jimenez M.D. - NPI: [id number]

## 2019-06-06 NOTE — CARE PLAN
Problem: Respiratory:  Goal: Respiratory status will improve  Outcome: PROGRESSING AS EXPECTED      Problem: Fluid Volume:  Goal: Will maintain balanced intake and output  Outcome: PROGRESSING AS EXPECTED      Problem: Communication  Goal: The ability to communicate needs accurately and effectively will improve  Outcome: PROGRESSING AS EXPECTED

## 2019-06-06 NOTE — ASSESSMENT & PLAN NOTE
Last echocardiogram showed a preserved left ventricular systolic function with an ejection fraction of 60% and grade 2 diastolic dysfunction.    BNP elevated  Did have some volume overload which improved with Lasix.  Will need a higher dose of Lasix outpatient

## 2019-06-06 NOTE — PROGRESS NOTES
Patient is primarily admitted for COPD exacerbation. She has also, incidentally been diagnosed with HFpEF by hospital medicine.     Patient has never been seen by cardiology. Nor does she have an outpatient diagnosis from primary care.     Because the inclusion of HF in her problems list will lead to this admission being primarily coded for HF and because the diagnosis of HFpEF is confounded by her other comorbid conditions (COPD, chronic respiratory failure, and CKD), and because it is a new diagnosis, a cardiology consult is needed.    Porsche BILL RN, [facility] ext. [phone] M-F

## 2019-06-06 NOTE — DISCHARGE PLANNING
Received Choice form at 1500  Agency/Facility Name: [facility]  Referral sent per Choice form @ 1500

## 2019-06-06 NOTE — DISCHARGE PLANNING
Anticipated Discharge Disposition: Home on home health when medically cleared.    Action: The patient has an order for home health.  This RN CM spoke with the patient about home health and she does wish to have it after discharge.  She said that she has used [facility] in the past and would like to go with them again.  She signed the choice form and I faxed it to the [facility] and he will send the referral.    Barriers to Discharge: Medical clearance and we will need to make sure that [facility] will accept the patient.    Plan[facility]

## 2019-06-06 NOTE — PROGRESS NOTES
[facility] Medicine Daily Progress Note    Date of Service  6/5/2019    Chief Complaint  68 y.o. female admitted 6/3/2019 with shortness of breath, leg swelling, anemia    Interval Problem Update  6/4: Hemoglobin trended down to 6.6.  Transfusing 1 unit RBCs.  Patient also having edema of the left leg worse than right.  Having constipation and says only lactulose works for her, ordered as needed.  6/5: Hemoglobin appears stabilized around 8.  Increased Lantus to 35.  3-day stop antibiotics discussion with antibiotic stewardship.  Changed to IV Lasix.  Ankle x-ray shows hardware in stable position with no complication.  Echo unremarkable.    Disposition  Patient plans to go to assisted living.  QuantiFERON gold pending    Review of Systems  Review of Systems   Constitutional: Negative for chills and fever.   Respiratory: Negative for cough and shortness of breath.    Cardiovascular: Positive for leg swelling. Negative for chest pain.   Gastrointestinal: Negative for blood in stool, constipation, diarrhea, melena, nausea and vomiting.   Neurological: Negative for headaches.   Psychiatric/Behavioral: The patient is nervous/anxious.       Physical Exam  Temp:  [36.5 °C (97.7 °F)-37.1 °C (98.8 °F)] 37.1 °C (98.7 °F)  Pulse:  [70-94] 90  Resp:  [16-24] 21  BP: (110-149)/(52-65) 111/64  SpO2:  [92 %-99 %] 92 %    Physical Exam   Constitutional: She appears well-developed.   HENT:   Head: Normocephalic.   Eyes: Conjunctivae are normal.   Cardiovascular: Normal rate.  Exam reveals no gallop.    Pulmonary/Chest: No respiratory distress. She has no wheezes.   Abdominal: She exhibits no distension. There is no tenderness.   Musculoskeletal: She exhibits edema (L > R ). She exhibits no tenderness.   Neurological: She is alert.       Fluids    Intake/Output Summary (Last 24 hours) at 06/05/19 1713  Last data filed at 06/05/19 1500   Gross per 24 hour   Intake           315.25 ml   Output             1550 ml   Net         -1234.75  ml       Laboratory  Recent Labs      06/03/19 2124 06/04/19 0252   06/04/19   2344  06/05/19   0731  06/05/19   1514   WBC  12.8*  12.7*   --   22.2*   --    --    RBC  2.09*  2.59*   --   3.03*   --    --    HEMOGLOBIN  6.0*  7.0*   < >  8.5*  7.9*  8.1*   HEMATOCRIT  19.9*  23.8*   --   26.9*   --    --    MCV  95.2  91.9   --   88.4   --    --    MCH  28.7  27.0   --   28.1   --    --    MCHC  30.2*  29.4*   --   31.7*   --    --    RDW  55.8*  63.6*   --   60.2*   --    --    PLATELETCT  235  236   --   263   --    --    MPV  9.2  9.3   --   9.1   --    --     < > = values in this interval not displayed.     Recent Labs      06/03/19 2124 06/04/19 0252  06/04/19   2344   SODIUM  137  136  134*   POTASSIUM  4.3  4.7  4.6   CHLORIDE  102  102  97   CO2  26  26  25   GLUCOSE  204*  340*  266*   BUN  31*  30*  40*   CREATININE  1.99*  1.86*  2.13*   CALCIUM  8.2*  8.3*  8.9         Recent Labs      06/03/19 2124 06/04/19   2344   BNPBTYPENAT  255*  561*           Imaging  EC-ECHOCARDIOGRAM COMPLETE W/O CONT   Final Result      DX-ANKLE 2- VIEWS LEFT   Final Result      1.  Ankle swelling. No acute osseous abnormality.   2.  Stable sequela of distal tibial and fibular ORIF. Hardware is in stable position. No evidence of hardware complication.      DX-CHEST-PORTABLE (1 VIEW)   Final Result      Hazy bibasilar opacities, possibly related to overlying soft tissues. Overlying atelectasis or early pneumonia cannot be excluded.           Assessment/Plan  * Acute exacerbation of chronic obstructive pulmonary disease (COPD) (HCC)- (present on admission)   Assessment & Plan    Patient has a known history of oxygen dependent COPD.  He is typically on 4 L at baseline but is currently requiring 6 L to maintain saturation greater than 88%.    Chest x-ray shows bibasilar opacities which could be early pneumonia.    Empiric antibiotics     Anemia requiring transfusions   Assessment & Plan    Patient has not had a  colonoscopy in over 5 years.  She denies any NSAID use.    She has not had any active bleeding in the emergency room.    Occult stool was positive  No melena or hematochezia seen  We will continue to monitor and consider GI consult if patient develops any visible bleeding or anemia does not stabilize     Chronic hypertension- (present on admission)   Assessment & Plan    This is chronic, blood pressure currently controlled on home Norvasc.  Okay to continue with holding parameters.     Edema- (present on admission)   Assessment & Plan    Left leg greater than right leg  BNP elevated  Discussed with ortho Uzair and ordered ankle x-ray which showed no issues with the previous hardware     Diastolic heart failure (HCC)   Assessment & Plan    Last echocardiogram showed a preserved left ventricular systolic function with an ejection fraction of 60% and grade 2 diastolic dysfunction.    BNP elevated  Changed from oral to IV Lasix     Type 2 diabetes mellitus with hyperglycemia, and peripheral neuropathy, with long-term current use of insulin (HCC)- (present on admission)   Assessment & Plan    This is chronic,   monitor with Accu-Cheks and cover with insulin sliding scale.  Adjusting Lantus in the meantime with hyperglycemia from steroids     Chronic constipation- (present on admission)   Assessment & Plan    Likely due to chronic opioid use.  If no improvement with bowel protocol, consider Relistor.  Per patient, only lactulose works so this was ordered     Hypothyroidism- (present on admission)   Assessment & Plan    This is chronic and stable, continue home Synthroid.     Chronic kidney disease, stage 3 (HCC)- (present on admission)   Assessment & Plan    Patient's renal function is at baseline, monitor.          VTE prophylaxis: No anticoagulation given anemia requiring transfusion

## 2019-06-06 NOTE — CARE PLAN
Problem: Oxygenation:  Goal: Maintain adequate oxygenation dependent on patient condition    Intervention: Levels of oxygenation will improve to baseline  4L NC       Problem: Bronchoconstriction:  Goal: Improve in air movement and diminished wheezing    Intervention: Implement inhaled treatments  Duo QID  Anoro  Flovent

## 2019-06-07 LAB
ALBUMIN SERPL BCP-MCNC: 3.3 G/DL (ref 3.2–4.9)
BNP SERPL-MCNC: 242 PG/ML (ref 0–100)
BUN SERPL-MCNC: 49 MG/DL (ref 8–22)
CALCIUM SERPL-MCNC: 8.5 MG/DL (ref 8.5–10.5)
CHLORIDE SERPL-SCNC: 101 MMOL/L (ref 96–112)
CO2 SERPL-SCNC: 26 MMOL/L (ref 20–33)
CREAT SERPL-MCNC: 1.72 MG/DL (ref 0.5–1.4)
ERYTHROCYTE [DISTWIDTH] IN BLOOD BY AUTOMATED COUNT: 56.9 FL (ref 35.9–50)
GAMMA INTERFERON BACKGROUND BLD IA-ACNC: 0.02 IU/ML
GLUCOSE BLD-MCNC: 171 MG/DL (ref 65–99)
GLUCOSE BLD-MCNC: 206 MG/DL (ref 65–99)
GLUCOSE BLD-MCNC: 248 MG/DL (ref 65–99)
GLUCOSE BLD-MCNC: 74 MG/DL (ref 65–99)
GLUCOSE SERPL-MCNC: 106 MG/DL (ref 65–99)
HCT VFR BLD AUTO: 26.7 % (ref 37–47)
HGB BLD-MCNC: 8.2 G/DL (ref 12–16)
M TB IFN-G BLD-IMP: ABNORMAL
M TB IFN-G CD4+ BCKGRND COR BLD-ACNC: -0.01 IU/ML
MCH RBC QN AUTO: 27.6 PG (ref 27–33)
MCHC RBC AUTO-ENTMCNC: 30.7 G/DL (ref 33.6–35)
MCV RBC AUTO: 89.9 FL (ref 81.4–97.8)
MITOGEN IGNF BCKGRD COR BLD-ACNC: 0.07 IU/ML
PHOSPHATE SERPL-MCNC: 5 MG/DL (ref 2.5–4.5)
PLATELET # BLD AUTO: 226 K/UL (ref 164–446)
PMV BLD AUTO: 9 FL (ref 9–12.9)
POTASSIUM SERPL-SCNC: 4.2 MMOL/L (ref 3.6–5.5)
QFT TB2 - NIL TBQ2: -0.01 IU/ML
RBC # BLD AUTO: 2.97 M/UL (ref 4.2–5.4)
SODIUM SERPL-SCNC: 136 MMOL/L (ref 135–145)
WBC # BLD AUTO: 13.2 K/UL (ref 4.8–10.8)

## 2019-06-07 PROCEDURE — 700111 HCHG RX REV CODE 636 W/ 250 OVERRIDE (IP): Performed by: HOSPITALIST

## 2019-06-07 PROCEDURE — A9270 NON-COVERED ITEM OR SERVICE: HCPCS | Performed by: HOSPITALIST

## 2019-06-07 PROCEDURE — 36415 COLL VENOUS BLD VENIPUNCTURE: CPT

## 2019-06-07 PROCEDURE — 99233 SBSQ HOSP IP/OBS HIGH 50: CPT | Performed by: HOSPITALIST

## 2019-06-07 PROCEDURE — 97165 OT EVAL LOW COMPLEX 30 MIN: CPT

## 2019-06-07 PROCEDURE — 700102 HCHG RX REV CODE 250 W/ 637 OVERRIDE(OP): Performed by: HOSPITALIST

## 2019-06-07 PROCEDURE — 99222 1ST HOSP IP/OBS MODERATE 55: CPT | Performed by: INTERNAL MEDICINE

## 2019-06-07 PROCEDURE — 82962 GLUCOSE BLOOD TEST: CPT

## 2019-06-07 PROCEDURE — 97161 PT EVAL LOW COMPLEX 20 MIN: CPT

## 2019-06-07 PROCEDURE — 80069 RENAL FUNCTION PANEL: CPT

## 2019-06-07 PROCEDURE — 83880 ASSAY OF NATRIURETIC PEPTIDE: CPT

## 2019-06-07 PROCEDURE — 85027 COMPLETE CBC AUTOMATED: CPT

## 2019-06-07 PROCEDURE — 770020 HCHG ROOM/CARE - TELE (206)

## 2019-06-07 RX ORDER — FUROSEMIDE 40 MG/1
40 TABLET ORAL
Status: DISCONTINUED | OUTPATIENT
Start: 2019-06-07 | End: 2019-06-08 | Stop reason: HOSPADM

## 2019-06-07 RX ORDER — FUROSEMIDE 40 MG/1
40 TABLET ORAL DAILY
Qty: 30 TAB | Refills: 2 | Status: SHIPPED | OUTPATIENT
Start: 2019-06-08 | End: 2019-08-06

## 2019-06-07 RX ORDER — DILTIAZEM HYDROCHLORIDE 120 MG/1
120 CAPSULE, COATED, EXTENDED RELEASE ORAL
Status: DISCONTINUED | OUTPATIENT
Start: 2019-06-07 | End: 2019-06-08 | Stop reason: HOSPADM

## 2019-06-07 RX ADMIN — INSULIN GLARGINE 35 UNITS: 100 INJECTION, SOLUTION SUBCUTANEOUS at 20:29

## 2019-06-07 RX ADMIN — OXYCODONE HYDROCHLORIDE 10 MG: 10 TABLET ORAL at 12:57

## 2019-06-07 RX ADMIN — DILTIAZEM HYDROCHLORIDE 120 MG: 120 CAPSULE, COATED, EXTENDED RELEASE ORAL at 17:51

## 2019-06-07 RX ADMIN — FLUTICASONE PROPIONATE 88 MCG: 44 AEROSOL, METERED RESPIRATORY (INHALATION) at 06:11

## 2019-06-07 RX ADMIN — TRAZODONE HYDROCHLORIDE 150 MG: 50 TABLET ORAL at 20:25

## 2019-06-07 RX ADMIN — OXYCODONE HYDROCHLORIDE 10 MG: 10 TABLET ORAL at 17:53

## 2019-06-07 RX ADMIN — UMECLIDINIUM BROMIDE AND VILANTEROL TRIFENATATE 1 PUFF: 62.5; 25 POWDER RESPIRATORY (INHALATION) at 06:06

## 2019-06-07 RX ADMIN — INSULIN HUMAN 4 UNITS: 100 INJECTION, SOLUTION PARENTERAL at 11:27

## 2019-06-07 RX ADMIN — SENNOSIDES AND DOCUSATE SODIUM 2 TABLET: 8.6; 5 TABLET ORAL at 06:08

## 2019-06-07 RX ADMIN — PREDNISONE 40 MG: 20 TABLET ORAL at 06:09

## 2019-06-07 RX ADMIN — INSULIN LISPRO 4 UNITS: 100 INJECTION, SOLUTION INTRAVENOUS; SUBCUTANEOUS at 18:03

## 2019-06-07 RX ADMIN — INSULIN LISPRO 3 UNITS: 100 INJECTION, SOLUTION INTRAVENOUS; SUBCUTANEOUS at 20:28

## 2019-06-07 RX ADMIN — LEVOTHYROXINE SODIUM 75 MCG: 75 TABLET ORAL at 06:09

## 2019-06-07 RX ADMIN — DULOXETINE HYDROCHLORIDE 60 MG: 60 CAPSULE, DELAYED RELEASE ORAL at 06:08

## 2019-06-07 RX ADMIN — OXYCODONE HYDROCHLORIDE 10 MG: 10 TABLET ORAL at 22:10

## 2019-06-07 RX ADMIN — INSULIN LISPRO 5 UNITS: 100 INJECTION, SOLUTION INTRAVENOUS; SUBCUTANEOUS at 18:02

## 2019-06-07 RX ADMIN — CYCLOBENZAPRINE 10 MG: 10 TABLET, FILM COATED ORAL at 20:26

## 2019-06-07 RX ADMIN — ROSUVASTATIN CALCIUM 10 MG: 20 TABLET, FILM COATED ORAL at 06:09

## 2019-06-07 RX ADMIN — FUROSEMIDE 40 MG: 40 TABLET ORAL at 08:03

## 2019-06-07 RX ADMIN — AMITRIPTYLINE HYDROCHLORIDE 50 MG: 50 TABLET, FILM COATED ORAL at 20:26

## 2019-06-07 ASSESSMENT — COGNITIVE AND FUNCTIONAL STATUS - GENERAL
TURNING FROM BACK TO SIDE WHILE IN FLAT BAD: A LITTLE
SUGGESTED CMS G CODE MODIFIER MOBILITY: CK
MOBILITY SCORE: 17
WALKING IN HOSPITAL ROOM: A LITTLE
HELP NEEDED FOR BATHING: A LITTLE
CLIMB 3 TO 5 STEPS WITH RAILING: A LOT
MOVING TO AND FROM BED TO CHAIR: A LITTLE
STANDING UP FROM CHAIR USING ARMS: A LITTLE
MOVING FROM LYING ON BACK TO SITTING ON SIDE OF FLAT BED: A LITTLE
DRESSING REGULAR LOWER BODY CLOTHING: A LITTLE
SUGGESTED CMS G CODE MODIFIER DAILY ACTIVITY: CJ
DAILY ACTIVITIY SCORE: 22

## 2019-06-07 ASSESSMENT — ACTIVITIES OF DAILY LIVING (ADL): TOILETING: INDEPENDENT

## 2019-06-07 ASSESSMENT — GAIT ASSESSMENTS
ASSISTIVE DEVICE: FRONT WHEEL WALKER
DISTANCE (FEET): 30
GAIT LEVEL OF ASSIST: SUPERVISED

## 2019-06-07 ASSESSMENT — ENCOUNTER SYMPTOMS
HEADACHES: 0
BLOOD IN STOOL: 0
NAUSEA: 0
VOMITING: 0
SHORTNESS OF BREATH: 0
CHILLS: 0
COUGH: 0
FEVER: 0
NERVOUS/ANXIOUS: 1

## 2019-06-07 NOTE — PROGRESS NOTES
Upon shift patient had no IV access. Pt was stick two times (once using ultra sound). Pt is possible discharge today. Dr. Jimenez made aware and stated to hold off on no IV access until further told. Will continue to monitor.

## 2019-06-07 NOTE — DISCHARGE PLANNING
All requested forms faxed to Moise Maher at [facility]. [place] notified that quantiferon gold result remains pending. Pt states she has one friend she can check with tomorrow to see if she can give pt ride home, otherwise assisstance with transport will be needed, probably [employer] via appropved services. Approx cost of cab is $121 which pt states she cannot afford.

## 2019-06-07 NOTE — PROGRESS NOTES
[facility] Medicine Daily Progress Note    Date of Service  6/7/2019    Chief Complaint  68 y.o. female admitted 6/3/2019 with shortness of breath, leg swelling, anemia    Interval Problem Update  6/4: Hemoglobin trended down to 6.6.  Transfusing 1 unit RBCs.  Patient also having edema of the left leg worse than right.  Having constipation and says only lactulose works for her, ordered as needed.  6/5: Hemoglobin appears stabilized around 8.  Increased Lantus to 35.  3-day stop antibiotics discussion with antibiotic stewardship.  Changed to IV Lasix.  Ankle x-ray shows hardware in stable position with no complication.  Echo unremarkable.  6/6: Patient swelling starting to improve.  Ordered home health.  Discussed with GI for outpatient follow-up.  6/7: Total time: 37 minutes. Of this time, greater than 50% was spent counseling and coordinating care including discussion of living situation, Lasix, diet, therapy. Lost IV access and replaced.  Work with PT who agreed with home health.  Accepted by [facility] home health.  Filled out assisted living forms.  Consulted cardiologist Dr. Garcia who ruled out heart failure and recommended changing to diltiazem.    Disposition  Patient plans to go to assisted living.  QuantiFERON gold equivocal  Patient may follow-up with GI [facility] outpatient  Ordered home health    Review of Systems  Review of Systems   Constitutional: Negative for chills and fever.   Respiratory: Negative for cough and shortness of breath.    Cardiovascular: Positive for leg swelling. Negative for chest pain.   Gastrointestinal: Negative for blood in stool, melena, nausea and vomiting.   Neurological: Negative for headaches.   Psychiatric/Behavioral: The patient is nervous/anxious.       Physical Exam  Temp:  [36.1 °C (96.9 °F)-36.4 °C (97.6 °F)] 36.2 °C (97.1 °F)  Pulse:  [61-84] 77  Resp:  [16-18] 16  BP: (102-152)/(45-63) 152/62  SpO2:  [91 %-97 %] 93 %    Physical Exam   Constitutional: She appears  well-developed.   HENT:   Head: Normocephalic.   Eyes: Conjunctivae are normal.   Cardiovascular: Normal rate.  Exam reveals no gallop.    Pulmonary/Chest: No respiratory distress. She has no wheezes.   Abdominal: She exhibits no distension. There is no tenderness.   Musculoskeletal: She exhibits edema (L > R ). She exhibits no tenderness.   Neurological: She is alert.   Skin: No rash noted. No erythema.       Fluids    Intake/Output Summary (Last 24 hours) at 06/07/19 1659  Last data filed at 06/06/19 2200   Gross per 24 hour   Intake              240 ml   Output              475 ml   Net             -235 ml       Laboratory  Recent Labs      06/04/19   2344 06/05/19   1514  06/06/19 0223  06/07/19   0323   WBC  22.2*   --    --   16.4*  13.2*   RBC  3.03*   --    --   2.76*  2.97*   HEMOGLOBIN  8.5*   < >  8.1*  7.6*  8.2*   HEMATOCRIT  26.9*   --    --   25.1*  26.7*   MCV  88.4   --    --   90.9  89.9   MCH  28.1   --    --   27.5  27.6   MCHC  31.7*   --    --   30.3*  30.7*   RDW  60.2*   --    --   59.3*  56.9*   PLATELETCT  263   --    --   222  226   MPV  9.1   --    --   9.2  9.0    < > = values in this interval not displayed.     Recent Labs      06/04/19   2344 06/06/19 0223 06/07/19 0323   SODIUM  134*  138  136   POTASSIUM  4.6  4.5  4.2   CHLORIDE  97  99  101   CO2  25  30  26   GLUCOSE  266*  151*  106*   BUN  40*  45*  49*   CREATININE  2.13*  1.89*  1.72*   CALCIUM  8.9  8.4*  8.5         Recent Labs      06/04/19   2344  06/07/19   0323   BNPBTYPENAT  561*  242*           Imaging  EC-ECHOCARDIOGRAM COMPLETE W/O CONT   Final Result      DX-ANKLE 2- VIEWS LEFT   Final Result      1.  Ankle swelling. No acute osseous abnormality.   2.  Stable sequela of distal tibial and fibular ORIF. Hardware is in stable position. No evidence of hardware complication.      DX-CHEST-PORTABLE (1 VIEW)   Final Result      Hazy bibasilar opacities, possibly related to overlying soft tissues. Overlying  atelectasis or early pneumonia cannot be excluded.           Assessment/Plan  * Acute exacerbation of chronic obstructive pulmonary disease (COPD) (HCC)- (present on admission)   Assessment & Plan    Patient has a known history of oxygen dependent COPD.  He is typically on 4 L at baseline but is currently requiring 6 L to maintain saturation greater than 88%.    Chest x-ray shows bibasilar opacities which could be early pneumonia.    Empiric antibiotics     Anemia requiring transfusions   Assessment & Plan    Patient has not had a colonoscopy in over 5 years.  She denies any NSAID use.    She does state that she has significant hemorrhoids however  She has not had any active bleeding in the emergency room.    Occult stool was positive  No melena or hematochezia seen  We will continue to monitor and consider GI consult if patient develops any visible bleeding or anemia does not stabilize  Discussed with GI Dr. Ibarra and patient may follow-up outpatient for colonoscopy     Chronic hypertension- (present on admission)   Assessment & Plan    This is chronic, blood pressure currently controlled on home Norvasc.  Okay to continue with holding parameters.     Edema- (present on admission)   Assessment & Plan    Left leg greater than right leg  BNP elevated  Discussed with ortho Uzair and ordered ankle x-ray which showed no issues with the previous hardware     Heart failure ruled out by cardiologist Dr. Garcia 6/7/2019- (present on admission)   Assessment & Plan    Last echocardiogram showed a preserved left ventricular systolic function with an ejection fraction of 60% and grade 2 diastolic dysfunction.    BNP elevated  Did have some volume overload which improved with Lasix.  Will need a higher dose of Lasix outpatient     Type 2 diabetes mellitus with hyperglycemia, and peripheral neuropathy, with long-term current use of insulin (HCC)- (present on admission)   Assessment & Plan    This is chronic,   monitor with  Accu-Cheks and cover with insulin sliding scale.  Adjusting Lantus in the meantime with hyperglycemia from steroids     Chronic constipation- (present on admission)   Assessment & Plan    Likely due to chronic opioid use.  If no improvement with bowel protocol, consider Relistor.  Per patient, only lactulose works so this was ordered     Hypothyroidism- (present on admission)   Assessment & Plan    This is chronic and stable, continue home Synthroid.     Chronic kidney disease, stage 3 (HCC)- (present on admission)   Assessment & Plan    Patient's renal function is at baseline, monitor.     Essential hypertension- (present on admission)   Assessment & Plan    Changed from amlodipine to diltiazem per cardiology recommendation for better heart rate control          VTE prophylaxis: No anticoagulation given anemia requiring transfusion

## 2019-06-07 NOTE — CONSULTS
"Reason of Consult: Congestive heart failure    Consulting Physician: Dr. Jimenez    HPI:  68-year-old female presented with dyspnea on exertion and shortness of breath and has a history of oxygen dependent COPD, diabetes mellitus, hypertension, hepatitis C, CKD and sleep apnea noncompliant with CPAP.  She was noted on arrival to be profoundly anemic with hemoglobin of 6.0 and is status post transfusion of PRBCs.  Cardiology is consulted to consider diagnosis of heart failure with preserved ejection fraction.  Echocardiogram reveals preserved left ventricular systolic function mild aortic stenosis moderate mitral stenosis and grade 2 diastolic dysfunction.  He is a current non-smoker who does not drink excessively or do drugs and has no family history of precocious CAD.  She is feeling better since her transfusion raising her hemoglobin to 8, and treatment for COPD.  She was administered diuretics posttransfusion.    Past Medical History:   Diagnosis Date   • Arthritis     \"everywhere\"   • ASTHMA    • Backpain     chronic back pain   • Breath shortness     \"only with flare up with allergies\"   • Bronchitis     as a child   • Congestive heart failure (HCC) 2013    related to pulmonary failure   • COPD    • Diabetes     Type 2   • Disorder of thyroid     Hypothyroid   • Fall    • Fibromyalgia    • GERD (gastroesophageal reflux disease)    • Heart burn    • Hepatitis C     \"I have markers in blood but not active\"   • Hypertension    • Indigestion    • Infectious disease     Hepatitis C   • Neuropathy (HCC)     bilat feet   • On home oxygen therapy    • Other specified symptom associated with female genital organs     Hysterectomy at age 22yrs   • Pain 9/13/2016    \"pain everywhere\"   • PND (post-nasal drip)    • Pneumonia     as a child   • Psychiatric problem 9/13/2016    PTSD   • RLS (restless legs syndrome)    • Sleep apnea     does not wear CPAP, \"can't do it\"   • Unspecified urinary incontinence        Social History "     Social History   • Marital status:      Spouse name: N/A   • Number of children: N/A   • Years of education: N/A     Occupational History   • Not on file.     Social History Main Topics   • Smoking status: Former Smoker     Packs/day: 1.00     Years: 50.00     Types: Cigarettes     Quit date: 3/20/2019   • Smokeless tobacco: Never Used   • Alcohol use No   • Drug use: No   • Sexual activity: No     Other Topics Concern   • Not on file     Social History Narrative   • No narrative on file       No current facility-administered medications on file prior to encounter.      Current Outpatient Prescriptions on File Prior to Encounter   Medication Sig Dispense Refill   • albuterol 108 (90 Base) MCG/ACT Aero Soln inhalation aerosol Inhale 2 Puffs by mouth every four hours as needed for Shortness of Breath.  3   • cyclobenzaprine (FLEXERIL) 10 MG Tab Take 10 mg by mouth every bedtime.  2   • oxyCODONE immediate release (ROXICODONE) 10 MG immediate release tablet Take 1 Tab by mouth every four hours as needed for Moderate Pain for up to 30 days. 90 Tab 0   • levothyroxine (SYNTHROID) 75 MCG Tab TAKE 1 TABLET BY MOUTH DAILY 90 Tab 3   • amitriptyline (ELAVIL) 50 MG Tab Take 50 mg by mouth every bedtime.  3   • DULoxetine (CYMBALTA) 60 MG Cap DR Particles delayed-release capsule TAKE 1 CAPSULE BY MOUTH DAILY 90 Cap 1       Current Facility-Administered Medications   Medication Dose Frequency Provider Last Rate Last Dose   • furosemide (LASIX) tablet 40 mg  40 mg Q DAY Mannie Jimenez M.D.   40 mg at 06/07/19 0803   • ipratropium-albuterol (DUONEB) nebulizer solution  3 mL Q4H PRN (RT) Mannie Jimenez M.D.       • insulin glargine (LANTUS) injection 35 Units  35 Units Nightly Mannie Jimenez M.D.   35 Units at 06/06/19 2108   • BIOTENE DRY MOUTH MOISTURIZING SOLN 2 Spray  2 Spray PRN Camryn Quiñones M.D.       • fluticasone (FLOVENT HFA) 44 MCG/ACT inhaler 88 mcg  2 Puff QAM Camryn Quiñones M.D.   88 mcg at 06/07/19 0611    And    • umeclidinium-vilanterol (ANORO ELLIPTA) inhaler 1 Puff  1 Puff QAM Camryn Quiñones M.D.   1 Puff at 06/07/19 0606   • ipratropium-albuterol (DUONEB) nebulizer solution  3 mL Q4H PRN (RT) Camryn Quiñones M.D.       • lactulose 20 GM/30ML solution 30 mL  30 mL 4X/DAY PRN Mannie Jimenez M.D.   30 mL at 06/04/19 1349   • insulin regular (HUMULIN R) injection 3-14 Units  3-14 Units 4X/DAY ACHS Mannie Jimenez M.D.   Stopped at 06/07/19 0700    And   • glucose 4 g chewable tablet 16 g  16 g Q15 MIN PRN Mannie Jimenez M.D.        And   • DEXTROSE 10% BOLUS 250 mL  250 mL Q15 MIN PRN Mannie Jimenez M.D.       • traZODone (DESYREL) tablet 150 mg  150 mg QHS Camryn Quiñones M.D.   150 mg at 06/06/19 2104   • rosuvastatin (CRESTOR) tablet 10 mg  10 mg QAM Camryn Quiñones M.D.   10 mg at 06/07/19 0609   • oxyCODONE immediate release (ROXICODONE) tablet 10 mg  10 mg Q4HRS PRN Camryn Quiñones M.D.   Stopped at 06/07/19 0601   • levothyroxine (SYNTHROID) tablet 75 mcg  75 mcg AM ES Camryn Quiñones M.D.   75 mcg at 06/07/19 0609   • DULoxetine (CYMBALTA) capsule 60 mg  60 mg DAILY Camryn Quiñones M.D.   60 mg at 06/07/19 0608   • cyclobenzaprine (FLEXERIL) tablet 10 mg  10 mg QHS Camryn Quiñones M.D.   10 mg at 06/06/19 2118   • amLODIPine (NORVASC) tablet 10 mg  10 mg QHS Camryn Quiñones M.D.   10 mg at 06/06/19 2105   • amitriptyline (ELAVIL) tablet 50 mg  50 mg QHS Camryn Quiñones M.D.   50 mg at 06/06/19 2105   • Respiratory Care per Protocol   Continuous RT Camryn Quiñones M.D.       • senna-docusate (PERICOLACE or SENOKOT S) 8.6-50 MG per tablet 2 Tab  2 Tab BID Camryn Quiñones M.D.   2 Tab at 06/07/19 0608    And   • polyethylene glycol/lytes (MIRALAX) PACKET 1 Packet  1 Packet QDAY PRN Camryn Quiñones M.D.        And   • magnesium hydroxide (MILK OF MAGNESIA) suspension 30 mL  30 mL QDAY PRN Camryn Quiñones M.D.        And   • bisacodyl (DULCOLAX) suppository 10 mg  10 mg QDAY PRN Camryn Quiñones M.D.   Stopped at 06/06/19 0659   •  "NS (BOLUS) infusion 500 mL  500 mL Once PRN Camryn Quiñones M.D.       • predniSONE (DELTASONE) tablet 40 mg  40 mg DAILY Camryn Quiñones M.D.   40 mg at 06/07/19 0609     Last reviewed on 6/3/2019 11:09 PM by Angeles Odom T    Vitamin c; Codeine; Gabapentin; Keflex; Lyrica; Sudafed; and Powders    Family History   Problem Relation Age of Onset   • Lung Disease Mother    • Alcohol/Drug Mother    • Heart Disease Mother    • Cancer Father    • Cancer Brother    • Diabetes Brother    • Diabetes Maternal Grandmother    • Stroke Paternal Grandmother    • Heart Disease Paternal Grandmother        ROS: As per HPI all other systems reviewed and negative   Physical Exam   Blood pressure 110/63, pulse 84, temperature 36.2 °C (97.1 °F), temperature source Temporal, resp. rate 18, height 1.651 m (5' 5\"), weight 100.5 kg (221 lb 9 oz), SpO2 94 %, not currently breastfeeding.    Constitutional: Pale.  Appears well-developed.   HENT: Normocephalic and atraumatic. No scleral icterus.   Neck: No JVD present.   Cardiovascular: Normal rate. Exam reveals no gallop and no friction rub.  2/6 systolic ejection murmur heard.   Pulmonary/Chest: CTAB    Abdominal: S/NT/ND BS+   Musculoskeletal:  Pulses present. No atrophy. Strength normal.  Extremities: Exhibits 1+ left greater than right edema with chronic venous stasis changes. No clubbing or cyanosis.   Skin: Skin is warm and dry.   Neuro: Non-focal, CN 2-12 intact grossly      Intake/Output Summary (Last 24 hours) at 06/07/19 0934  Last data filed at 06/06/19 2200   Gross per 24 hour   Intake              240 ml   Output              725 ml   Net             -485 ml       Recent Labs      06/04/19   2344   06/05/19   1514  06/06/19   0223  06/07/19   0323   WBC  22.2*   --    --   16.4*  13.2*   RBC  3.03*   --    --   2.76*  2.97*   HEMOGLOBIN  8.5*   < >  8.1*  7.6*  8.2*   HEMATOCRIT  26.9*   --    --   25.1*  26.7*   MCV  88.4   --    --   90.9  89.9   MCH  28.1   --    --   27.5  " 27.6   MCHC  31.7*   --    --   30.3*  30.7*   RDW  60.2*   --    --   59.3*  56.9*   PLATELETCT  263   --    --   222  226   MPV  9.1   --    --   9.2  9.0    < > = values in this interval not displayed.     Recent Labs      06/04/19   2344  06/06/19   0223  06/07/19   0323   SODIUM  134*  138  136   POTASSIUM  4.6  4.5  4.2   CHLORIDE  97  99  101   CO2  25  30  26   GLUCOSE  266*  151*  106*   BUN  40*  45*  49*   CREATININE  2.13*  1.89*  1.72*   CALCIUM  8.9  8.4*  8.5         Recent Labs      06/04/19 2344  06/07/19   0323   BNPBTYPENAT  561*  242*     Recent Labs      06/04/19 2344  06/07/19   0323   BNPBTYPENAT  561*  242*           EKG (6/3/2019 ):  I have personally reviewed the EKG this visit and discussed with the patient. It shows sinus rhythm PAC left atrial abnormality and nonspecific ST-T wave abnormalities.    ECHO CONCLUSIONS (6/5/2019):  Normal left ventricular systolic function.  Left ventricular ejection fraction is visually estimated to be 60%.  Grade II diastolic dysfunction.  The right ventricle was normal in size and function.  Moderate mitral stenosis.  Mean transvalvular gradient is 9 mmHg at a heart rate of  BPM.  Mild aortic stenosis.  Compared to the images of the prior study done 3/16/2017 -  there is   now evidence for mild aortic stenosis. The estimate of right   ventricular systolic pressure cannot be made on the current exam,   previously 60 mmHg.    Imaging reviewed    Impressions:  1.  Acute symptomatic anemia status post transfusion  2.  COPD, oxygen dependent  3.  Valvular heart disease, mild to moderate  4.  Chronic kidney disease  5.  Diabetes mellitus  6.  Hypertension  7.  Hepatitis C    Recommendations:  Her presentation of dyspnea was most compatible with her profound anemia with a hemoglobin on presentation of 6.0.  She did receive diuretic therapy however she received blood products which is a large.  Her BNP was borderline for elevation and in this context not  significant given her chronic kidney disease. There is no evidence of systolic or diastolic heart failure clinically.  She does have moderate mitral stenosis and therefore beta-blockade is appropriate and she could have a component of valvular related dyspnea.  This should be followed up in cardiology clinic.  Her presentation is not compatible with an exacerbation of congestive heart failure.  I would recommend optimal therapy for her COPD and volume management in the context of blood product administration as well as addressing the underlying etiology of her anemia.    I do recommend however transition from amlodipine to Cardizem to reduce her overall heart rate responsiveness as this is appropriate medical treatment in the context of her mitral stenosis and may help alleviate any exertional related symptoms.    Follow-up with cardiology as an outpatient.  No need for heart failure clinic appointments.    Thank you for the consultation. Cardiology will sign off. Please call with any questions.    Parvez Garcia MD, FACC, [facility]  Division of Interventional Cardiology  [facility]

## 2019-06-07 NOTE — DISCHARGE PLANNING
Group home assessment faxed to Helena Maher at [facility], fax: [phone]. PT/OT evals pending. Original returned to pt.

## 2019-06-07 NOTE — THERAPY
"Physical Therapy Evaluation completed.   Bed Mobility:  Supine to Sit: Supervised  Transfers: Sit to Stand: Supervised  Gait: Level Of Assist: Supervised with Front-Wheel Walker       Plan of Care: Patient with no further skilled PT needs in the acute care setting at this time  Discharge Recommendations: Equipment: 4-Wheel Walker. Post-acute therapy Discharge to home with home health for additional skilled therapy services.    See \"Rehab Therapy-Acute\" Patient Summary Report for complete documentation.     Pt is a 67 y/o female that presents to acute care with COPD exacerbation. Pt reported that she plans to move to an assisted living facility this weekend. Pt states that she is close to baseline. Pt has decreased endurance likely due to COPD. After walking 15' pt needed to sit in a chair due to SOB and SpO2 decreasing to 85%. With pt sitting EOB SpO2 decreased to 83%, but recovered back to 90% after ~2 mins. Pt demonstrated bed mobility, transfers, and ambulation at spv level. Based on pt level of mobility acute PT is not needed. Recommend that the pt recieve home health PT to continue to improve endurance.   "

## 2019-06-07 NOTE — PROGRESS NOTES
Discussed with SHITAL Ricketts with care coordination about setting up a ride for patient tomorrow for discharge. Per SHITAL Ricketts transportation can be set up the day of discharge. Discussed with patient if she could get a ride tomorrow and she could not grantee a ride.

## 2019-06-07 NOTE — PROGRESS NOTES
Hospital Medicine Daily Progress Note    Date of Service  6/6/2019    Chief Complaint  68 y.o. female admitted 6/3/2019 with shortness of breath, leg swelling, anemia    Interval Problem Update  6/4: Hemoglobin trended down to 6.6.  Transfusing 1 unit RBCs.  Patient also having edema of the left leg worse than right.  Having constipation and says only lactulose works for her, ordered as needed.  6/5: Hemoglobin appears stabilized around 8.  Increased Lantus to 35.  3-day stop antibiotics discussion with antibiotic stewardship.  Changed to IV Lasix.  Ankle x-ray shows hardware in stable position with no complication.  Echo unremarkable.  6/6: Patient swelling starting to improve.  Ordered home health.  Discussed with GI for outpatient follow-up.    Disposition  Patient plans to go to assisted living.  QuantiFERON gold pending  Patient may follow-up with GI [facility] outpatient  Ordered home health    Review of Systems  Review of Systems   Constitutional: Negative for chills and fever.   Respiratory: Negative for cough and shortness of breath.    Cardiovascular: Positive for leg swelling. Negative for chest pain.   Gastrointestinal: Negative for blood in stool, melena, nausea and vomiting.   Neurological: Negative for headaches.   Psychiatric/Behavioral: The patient is nervous/anxious.       Physical Exam  Temp:  [36.4 °C (97.6 °F)-37.2 °C (98.9 °F)] 37.2 °C (98.9 °F)  Pulse:  [67-80] 77  Resp:  [18-20] 20  BP: (116-141)/(45-56) 116/51  SpO2:  [91 %-98 %] 95 %    Physical Exam   Constitutional: She appears well-developed.   HENT:   Head: Normocephalic.   Eyes: Conjunctivae are normal.   Cardiovascular: Normal rate.  Exam reveals no gallop.    Pulmonary/Chest: No respiratory distress. She has no wheezes.   Abdominal: She exhibits no distension. There is no tenderness.   Musculoskeletal: She exhibits edema (L > R ). She exhibits no tenderness.   Neurological: She is alert.   Skin: No rash noted. No erythema.        Fluids    Intake/Output Summary (Last 24 hours) at 06/06/19 1710  Last data filed at 06/06/19 0945   Gross per 24 hour   Intake                0 ml   Output             1450 ml   Net            -1450 ml       Laboratory  Recent Labs      06/04/19   0252   06/04/19   2344  06/05/19   0731  06/05/19   1514  06/06/19   0223   WBC  12.7*   --   22.2*   --    --   16.4*   RBC  2.59*   --   3.03*   --    --   2.76*   HEMOGLOBIN  7.0*   < >  8.5*  7.9*  8.1*  7.6*   HEMATOCRIT  23.8*   --   26.9*   --    --   25.1*   MCV  91.9   --   88.4   --    --   90.9   MCH  27.0   --   28.1   --    --   27.5   MCHC  29.4*   --   31.7*   --    --   30.3*   RDW  63.6*   --   60.2*   --    --   59.3*   PLATELETCT  236   --   263   --    --   222   MPV  9.3   --   9.1   --    --   9.2    < > = values in this interval not displayed.     Recent Labs      06/04/19 0252 06/04/19   2344  06/06/19   0223   SODIUM  136  134*  138   POTASSIUM  4.7  4.6  4.5   CHLORIDE  102  97  99   CO2  26  25  30   GLUCOSE  340*  266*  151*   BUN  30*  40*  45*   CREATININE  1.86*  2.13*  1.89*   CALCIUM  8.3*  8.9  8.4*         Recent Labs      06/03/19 2124 06/04/19 2344   BNPBTYPENAT  255*  561*           Imaging  EC-ECHOCARDIOGRAM COMPLETE W/O CONT   Final Result      DX-ANKLE 2- VIEWS LEFT   Final Result      1.  Ankle swelling. No acute osseous abnormality.   2.  Stable sequela of distal tibial and fibular ORIF. Hardware is in stable position. No evidence of hardware complication.      DX-CHEST-PORTABLE (1 VIEW)   Final Result      Hazy bibasilar opacities, possibly related to overlying soft tissues. Overlying atelectasis or early pneumonia cannot be excluded.           Assessment/Plan  * Acute exacerbation of chronic obstructive pulmonary disease (COPD) (HCC)- (present on admission)   Assessment & Plan    Patient has a known history of oxygen dependent COPD.  He is typically on 4 L at baseline but is currently requiring 6 L to maintain  saturation greater than 88%.    Chest x-ray shows bibasilar opacities which could be early pneumonia.    Empiric antibiotics     Anemia requiring transfusions   Assessment & Plan    Patient has not had a colonoscopy in over 5 years.  She denies any NSAID use.    She has not had any active bleeding in the emergency room.    Occult stool was positive  No melena or hematochezia seen  We will continue to monitor and consider GI consult if patient develops any visible bleeding or anemia does not stabilize  Discussed with GI Dr. Ibarra and patient may follow-up outpatient for colonoscopy     Chronic hypertension- (present on admission)   Assessment & Plan    This is chronic, blood pressure currently controlled on home Norvasc.  Okay to continue with holding parameters.     Edema- (present on admission)   Assessment & Plan    Left leg greater than right leg  BNP elevated  Discussed with ortho Uzair and ordered ankle x-ray which showed no issues with the previous hardware     Acute on chronic diastolic CHF (congestive heart failure), NYHA class 1 (HCC)- (present on admission)   Assessment & Plan    Last echocardiogram showed a preserved left ventricular systolic function with an ejection fraction of 60% and grade 2 diastolic dysfunction.    BNP elevated  Changed from oral to IV Lasix     Type 2 diabetes mellitus with hyperglycemia, and peripheral neuropathy, with long-term current use of insulin (HCC)- (present on admission)   Assessment & Plan    This is chronic,   monitor with Accu-Cheks and cover with insulin sliding scale.  Adjusting Lantus in the meantime with hyperglycemia from steroids     Chronic constipation- (present on admission)   Assessment & Plan    Likely due to chronic opioid use.  If no improvement with bowel protocol, consider Relistor.  Per patient, only lactulose works so this was ordered     Hypothyroidism- (present on admission)   Assessment & Plan    This is chronic and stable, continue home  Synthroid.     Chronic kidney disease, stage 3 (HCC)- (present on admission)   Assessment & Plan    Patient's renal function is at baseline, monitor.          VTE prophylaxis: No anticoagulation given anemia requiring transfusion

## 2019-06-08 VITALS
WEIGHT: 221.12 LBS | OXYGEN SATURATION: 97 % | SYSTOLIC BLOOD PRESSURE: 146 MMHG | RESPIRATION RATE: 18 BRPM | TEMPERATURE: 96.9 F | HEART RATE: 63 BPM | BODY MASS INDEX: 36.84 KG/M2 | DIASTOLIC BLOOD PRESSURE: 60 MMHG | HEIGHT: 65 IN

## 2019-06-08 LAB
ALBUMIN SERPL BCP-MCNC: 3.1 G/DL (ref 3.2–4.9)
BACTERIA BLD CULT: NORMAL
BUN SERPL-MCNC: 53 MG/DL (ref 8–22)
CALCIUM SERPL-MCNC: 8.3 MG/DL (ref 8.5–10.5)
CHLORIDE SERPL-SCNC: 104 MMOL/L (ref 96–112)
CO2 SERPL-SCNC: 27 MMOL/L (ref 20–33)
CREAT SERPL-MCNC: 1.9 MG/DL (ref 0.5–1.4)
ERYTHROCYTE [DISTWIDTH] IN BLOOD BY AUTOMATED COUNT: 56.6 FL (ref 35.9–50)
GLUCOSE BLD-MCNC: 142 MG/DL (ref 65–99)
GLUCOSE SERPL-MCNC: 163 MG/DL (ref 65–99)
HCT VFR BLD AUTO: 26.8 % (ref 37–47)
HGB BLD-MCNC: 8.1 G/DL (ref 12–16)
MCH RBC QN AUTO: 27.4 PG (ref 27–33)
MCHC RBC AUTO-ENTMCNC: 30.2 G/DL (ref 33.6–35)
MCV RBC AUTO: 90.5 FL (ref 81.4–97.8)
PHOSPHATE SERPL-MCNC: 4.6 MG/DL (ref 2.5–4.5)
PLATELET # BLD AUTO: 221 K/UL (ref 164–446)
PMV BLD AUTO: 8.8 FL (ref 9–12.9)
POTASSIUM SERPL-SCNC: 4.3 MMOL/L (ref 3.6–5.5)
RBC # BLD AUTO: 2.96 M/UL (ref 4.2–5.4)
SIGNIFICANT IND 70042: NORMAL
SITE SITE: NORMAL
SODIUM SERPL-SCNC: 140 MMOL/L (ref 135–145)
SOURCE SOURCE: NORMAL
WBC # BLD AUTO: 11.8 K/UL (ref 4.8–10.8)

## 2019-06-08 PROCEDURE — 82962 GLUCOSE BLOOD TEST: CPT

## 2019-06-08 PROCEDURE — 80069 RENAL FUNCTION PANEL: CPT

## 2019-06-08 PROCEDURE — 85027 COMPLETE CBC AUTOMATED: CPT

## 2019-06-08 PROCEDURE — 99239 HOSP IP/OBS DSCHRG MGMT >30: CPT | Performed by: HOSPITALIST

## 2019-06-08 PROCEDURE — 700102 HCHG RX REV CODE 250 W/ 637 OVERRIDE(OP): Performed by: HOSPITALIST

## 2019-06-08 PROCEDURE — 700111 HCHG RX REV CODE 636 W/ 250 OVERRIDE (IP): Performed by: HOSPITALIST

## 2019-06-08 PROCEDURE — 36415 COLL VENOUS BLD VENIPUNCTURE: CPT

## 2019-06-08 PROCEDURE — A9270 NON-COVERED ITEM OR SERVICE: HCPCS | Performed by: HOSPITALIST

## 2019-06-08 RX ORDER — DILTIAZEM HYDROCHLORIDE 120 MG/1
120 CAPSULE, COATED, EXTENDED RELEASE ORAL DAILY
Qty: 30 CAP | Refills: 2 | Status: SHIPPED
Start: 2019-06-09 | End: 2019-12-17

## 2019-06-08 RX ADMIN — FUROSEMIDE 40 MG: 40 TABLET ORAL at 05:12

## 2019-06-08 RX ADMIN — DILTIAZEM HYDROCHLORIDE 120 MG: 120 CAPSULE, COATED, EXTENDED RELEASE ORAL at 05:11

## 2019-06-08 RX ADMIN — ROSUVASTATIN CALCIUM 10 MG: 20 TABLET, FILM COATED ORAL at 05:12

## 2019-06-08 RX ADMIN — OXYCODONE HYDROCHLORIDE 10 MG: 10 TABLET ORAL at 04:02

## 2019-06-08 RX ADMIN — DULOXETINE HYDROCHLORIDE 60 MG: 60 CAPSULE, DELAYED RELEASE ORAL at 05:11

## 2019-06-08 RX ADMIN — UMECLIDINIUM BROMIDE AND VILANTEROL TRIFENATATE 1 PUFF: 62.5; 25 POWDER RESPIRATORY (INHALATION) at 05:12

## 2019-06-08 RX ADMIN — SENNOSIDES AND DOCUSATE SODIUM 2 TABLET: 8.6; 5 TABLET ORAL at 05:11

## 2019-06-08 RX ADMIN — INSULIN LISPRO 5 UNITS: 100 INJECTION, SOLUTION INTRAVENOUS; SUBCUTANEOUS at 06:34

## 2019-06-08 RX ADMIN — FLUTICASONE PROPIONATE 88 MCG: 44 AEROSOL, METERED RESPIRATORY (INHALATION) at 05:12

## 2019-06-08 RX ADMIN — LEVOTHYROXINE SODIUM 75 MCG: 75 TABLET ORAL at 05:12

## 2019-06-08 RX ADMIN — PREDNISONE 40 MG: 20 TABLET ORAL at 05:12

## 2019-06-08 NOTE — DISCHARGE INSTRUCTIONS
Discharge Instructions    Discharged to home by taxi with self. Discharged via wheelchair, hospital escort: Yes.  Special equipment needed: Oxygen    Be sure to schedule a follow-up appointment with your primary care doctor or any specialists as instructed.     Discharge Plan:   Pneumococcal Vaccine Administered/Refused: Not given - Patient refused pneumococcal vaccine  Influenza Vaccine Indication: Not indicated: Previously immunized this influenza season and > 8 years of age    I understand that a diet low in cholesterol, fat, and sodium is recommended for good health. Unless I have been given specific instructions below for another diet, I accept this instruction as my diet prescription.   Other diet: Diabetic, low sodium    Special Instructions: None    · Is patient discharged on Warfarin / Coumadin?   No     Chronic Obstructive Pulmonary Disease  Chronic obstructive pulmonary disease (COPD) is a common lung problem. In COPD, the flow of air from the lungs is limited. The way your lungs work will probably never return to normal, but there are things you can do to improve your lungs and make yourself feel better. Your doctor may treat your condition with:  · Medicines.  · Oxygen.  · Lung surgery.  · Changes to your diet.  · Rehabilitation. This may involve a team of specialists.  Follow these instructions at home:  · Take all medicines as told by your doctor.  · Avoid medicines or cough syrups that dry up your airway (such as antihistamines) and do not allow you to get rid of thick spit. You do not need to avoid them if told differently by your doctor.  · If you smoke, stop. Smoking makes the problem worse.  · Avoid being around things that make your breathing worse (like smoke, chemicals, and fumes).  · Use oxygen therapy and therapy to help improve your lungs (pulmonary rehabilitation) if told by your doctor. If you need home oxygen therapy, ask your doctor if you should buy a tool to measure your oxygen level  (oximeter).  · Avoid people who have a sickness you can catch (contagious).  · Avoid going outside when it is very hot, cold, or humid.  · Eat healthy foods. Eat smaller meals more often. Rest before meals.  · Stay active, but remember to also rest.  · Make sure to get all the shots (vaccines) your doctor recommends. Ask your doctor if you need a pneumonia shot.  · Learn and use tips on how to relax.  · Learn and use tips on how to control your breathing as told by your doctor. Try:  1. Breathing in (inhaling) through your nose for 1 second. Then, pucker your lips and breath out (exhale) through your lips for 2 seconds.  2. Putting one hand on your belly (abdomen). Breathe in slowly through your nose for 1 second. Your hand on your belly should move out. Pucker your lips and breathe out slowly through your lips. Your hand on your belly should move in as you breathe out.  · Learn and use controlled coughing to clear thick spit from your lungs. The steps are:  1. Lean your head a little forward.  2. Breathe in deeply.  3. Try to hold your breath for 3 seconds.  4. Keep your mouth slightly open while coughing 2 times.  5. Spit any thick spit out into a tissue.  6. Rest and do the steps again 1 or 2 times as needed.  Contact a doctor if:  · You cough up more thick spit than usual.  · There is a change in the color or thickness of the spit.  · It is harder to breathe than usual.  · Your breathing is faster than usual.  Get help right away if:  · You have shortness of breath while resting.  · You have shortness of breath that stops you from:  ¨ Being able to talk.  ¨ Doing normal activities.  · You chest hurts for longer than 5 minutes.  · Your skin color is more blue than usual.  · Your pulse oximeter shows that you have low oxygen for longer than 5 minutes.  This information is not intended to replace advice given to you by your health care provider. Make sure you discuss any questions you have with your health care  provider.  Document Released: 06/05/2009 Document Revised: 05/25/2017 Document Reviewed: 08/14/2014  [employer] Interactive Patient Education © 2017 [employer]  Oxygen Use at Home  Oxygen can be prescribed for home use. The prescription will show the flow rate. This is how much oxygen is to be used per minute. This will be listed in liters per minute (LPM or L/M). A liter is a metric measurement of volume.  You will use oxygen therapy as directed. It can be used while exercising, sleeping, or at rest. You may need oxygen continuously. Your health care provider may order a blood oxygen test (arterial blood gas or pulse oximetry test) that will show what your oxygen level is. Your health care provider will use these measurements to learn about your needs and follow your progress.  Home oxygen therapy is commonly used on patients with various lung (pulmonary) related conditions. Some of these conditions include:  · Asthma.  · Lung cancer.  · Pneumonia.  · Emphysema.  · Chronic bronchitis.  · Cystic fibrosis.  · Other lung diseases.  · Pulmonary fibrosis.  · Occupational lung disease.  · Heart failure.  · Chronic obstructive pulmonary disease (COPD).  3 COMMON WAYS OF PROVIDING OXYGEN THERAPY  · Gas: The gas form of oxygen is put into variously sized cylinders or tanks. The cylinders or oxygen tanks contain compressed oxygen. The cylinder is equipped with a regulator that controls the flow rate. Because the flow of oxygen out of the cylinder is constant, an oxygen conserving device may be attached to the system to avoid waste. This device releases the gas only when you inhale and cuts it off when you exhale. Oxygen can be provided in a small cylinder that can be carried with you. Large tanks are heavy and are only for stationary use. After use, empty tanks must be exchanged for full tanks.  · Liquid: The liquid form of oxygen is put into a container similar to a thermos. When released, the liquid converts to a gas and  you breathe it in just like the compressed gas. This storage method takes up less space than the compressed gas cylinder, and you can transfer the liquid to a small, portable vessel at home. Liquid oxygen is more expensive than the compressed gas, and the vessel vents when not in use. An oxygen conserving device may be built into the vessel to conserve the oxygen. Liquid oxygen is very cold, around 297° below zero.  · Oxygen concentrator: This medical device filters oxygen from room air and gives almost 100% oxygen to the patient. Oxygen concentrators are powered by electricity. Benefits of this system are:  ¨ It does not need to be resupplied.  ¨ It is not as costly as liquid oxygen.  ¨ Extra tubing permits the user to move around easier.  There are several types of small, portable oxygen systems available which can help you remain active and mobile. You must have a cylinder of oxygen as a backup in the event of a power failure. Advise your electric power company that you are on oxygen therapy in order to get priority service when there is a power failure.  OXYGEN DELIVERY DEVICES  There are 3 common ways to deliver oxygen to your body.  · Nasal cannula. This is a 2-pronged device inserted in the nostrils that is connected to tubing carrying the oxygen. The tubing can rest on the ears or be attached to the frame of eyeglasses.  · Mask. People who need a high flow of oxygen generally use a mask.  · Transtracheal catheter. Transtracheal oxygen therapy requires the insertion of a small, flexible tube (catheter) in the windpipe (trachea). This catheter is held in place by a necklace. Since transtracheal oxygen bypasses the mouth, nose, and throat, a humidifier is absolutely required at flow rates of 1 LPM or greater.  OXYGEN USE SAFETY TIPS  · Never smoke while using oxygen. Oxygen does not burn or explode, but flammable materials will burn faster in the presence of oxygen.  · Keep a fire extinguisher close by. Let  "your fire department know that you have oxygen in your home.  · Warn visitors not to smoke near you when you are using oxygen. Put up \"no smoking\" signs in your home where you most often use the oxygen.  · When you go to a restaurant with your portable oxygen source, ask to be seated in the nonsmoking section.  · Stay at least 5 feet away from gas stoves, candles, lighted fireplaces, or other heat sources.  · Do not use materials that burn easily (flammable) while using your oxygen.  · If you use an oxygen cylinder, make sure it is secured to some fixed object or in a stand. If you use liquid oxygen, make sure the vessel is kept upright to keep the oxygen from pouring out. Liquid oxygen is so cold it can hurt your skin.  · If you use an oxygen concentrator, call your electric company so you will be given priority service if your power goes out. Avoid using extension cords, if possible.  · Regularly test your smoke detectors at home to make sure they work. If you receive care in your home from a nurse or other health care provider, he or she may also check to make sure your smoke detectors work.  GUIDELINES FOR CLEANING YOUR EQUIPMENT  · Wash the nasal prongs with a liquid soap. Thoroughly rinse them once or twice a week.  · Replace the prongs every 2 to 4 weeks. If you have an infection (cold, pneumonia) change them when you are well.  · Your health care provider will give you instructions on how to clean your transtracheal catheter.  · The humidifier bottle should be washed with soap and warm water and rinsed thoroughly between each refill. Air-dry the bottle before filling it with sterile or distilled water. The bottle and its top should be disinfected after they are cleaned.  · If you use an oxygen concentrator, unplug the unit. Then wipe down the cabinet with a damp cloth and dry it daily. The air filter should be cleaned at least twice a week.  · Follow your home medical equipment and service company's " directions for cleaning the compressor filter.  HOME CARE INSTRUCTIONS   · Do not change the flow of oxygen unless directed by your health care provider.  · Do not use alcohol or other sedating drugs unless instructed. They slow your breathing rate.  · Do not use materials that burn easily (flammable) while using your oxygen.  · Always keep a spare tank of oxygen. Plan ahead for holidays when you may not be able to get a prescription filled.  · Use water-based lubricants on your lips or nostrils. Do not use an oil-based product like petroleum jelly.  · To prevent your cheeks or the skin behind your ears from becoming irritated, tuck some gauze under the tubing.  · If you have persistent redness under your nose, call your health care provider.  · When you no longer need oxygen, your doctor will have the oxygen discontinued. Oxygen is not addicting or habit forming.  · Use the oxygen as instructed. Too much oxygen can be harmful and too little will not give you the benefit you need.  · Shortness of breath is not always from a lack of oxygen. If your oxygen level is not the cause of your shortness of breath, taking oxygen will not help.  SEEK MEDICAL CARE IF:   · You have frequent headaches.  · You have shortness of breath or a lasting cough.  · You have anxiety.  · You are confused.  · You are drowsy or sleepy all the time.  · You develop an illness which aggravates your breathing.  · You cannot exercise.  · You are restless.  · You have blue lips or fingernails.  · You have difficult or irregular breathing and it is getting worse.  · You have a fever.     This information is not intended to replace advice given to you by your health care provider. Make sure you discuss any questions you have with your health care provider.     Document Released: 03/09/2005 Document Revised: 01/08/2016 Document Reviewed: 07/30/2014  [employer] Interactive Patient Education ©2016 [employer]      Depression / Suicide Risk    As you are  discharged from this [facility] facility, it is important to learn how to keep safe from harming yourself.    Recognize the warning signs:  · Abrupt changes in personality, positive or negative- including increase in energy   · Giving away possessions  · Change in eating patterns- significant weight changes-  positive or negative  · Change in sleeping patterns- unable to sleep or sleeping all the time   · Unwillingness or inability to communicate  · Depression  · Unusual sadness, discouragement and loneliness  · Talk of wanting to die  · Neglect of personal appearance   · Rebelliousness- reckless behavior  · Withdrawal from people/activities they love  · Confusion- inability to concentrate     If you or a loved one observes any of these behaviors or has concerns about self-harm, here's what you can do:  · Talk about it- your feelings and reasons for harming yourself  · Remove any means that you might use to hurt yourself (examples: pills, rope, extension cords, firearm)  · Get professional help from the community (Mental Health, Substance Abuse, psychological counseling)  · Do not be alone:Call your Safe Contact- someone whom you trust who will be there for you.  · Call your local CRISIS HOTLINE 553-6474 or 145-341-3443  · Call your local Children's Mobile Crisis Response Team [place] (640) 169-5744 or www.Resolver  · Call the toll free National Suicide Prevention Hotlines   · National Suicide Prevention Lifeline 121-057-OBOD (8223)  · National Hope Line Network 800-SUICIDE (154-8885)    Increase home lasix to 40mg daily. Avoid salt and salty foods.  Switch from amlodipine to diltiazem. Okay to wait until Monday for the pharmacy to deliver diltiazem before switching.   Schedule an appt to see GI Dr. Ibarra.

## 2019-06-08 NOTE — PROGRESS NOTES
Report received from Jessica ISAACS. Tele box in place. Patient resting in bed. NAD noted. Bed in low position. Call light in reach with bed alarm on and audible. Patient denied any needs at this time. Will continue to monitor.

## 2019-06-08 NOTE — DISCHARGE SUMMARY
Hospital Medicine Discharge Note     Admit Date:  6/3/2019       Discharge Date:   6/8/2019    Attending Physician:  Mannie Jimenez     Diagnoses (includes active and resolved):   Principal Problem:    Acute exacerbation of chronic obstructive pulmonary disease (COPD) (HCC) POA: Yes  Active Problems:    Anemia requiring transfusions POA: Unknown    Chronic hypertension (Chronic) POA: Yes    Essential hypertension POA: Yes    Chronic kidney disease, stage 3 (HCC) POA: Yes    Hypothyroidism (Chronic) POA: Yes    Chronic constipation POA: Yes    Type 2 diabetes mellitus with hyperglycemia, and peripheral neuropathy, with long-term current use of insulin (HCC) POA: Yes    Heart failure ruled out by cardiologist Dr. Garcia 6/7/2019 POA: Yes    Edema POA: Yes  Resolved Problems:    * No resolved hospital problems. *      Hospital Summary (Brief Narrative):       68 y.o. female who presented on 6/3/2019 with shortness of breath.    She was admitted to our facility in March for COPD exacerbation and had been discharged home on oral steroids and antibiotics.  She states that unfortunately, she has not felt well since then.    She says she has had at least 5 courses of either steroids or antibiotics in the recent past.  She then ended up short of breath again.  Thus she came back.  She was admitted and treated.  She was found to have anemia with hemoglobin 6.6 and was transfused.  She improved.  She also had edema and she was diuresed.  X-ray was done of her previous ankle surgery from 2 years ago and hardware was found to be in position with no major issues.  Her swelling started to improve.  She continues to have shortness of breath especially with exertion but this has improved somewhat.  She was able to be discharged home and will continue taking Lasix at home but the dose has been increased.  Cardiology was consulted and heart failure was ruled out.  Patient will change her blood pressure medication from amlodipine over  to diltiazem.  She did have a QuantiFERON gold that was equivocal but she plans to move to assisted living on Sunday.      The patient met 2-midnight criteria for an inpatient stay at the time of discharge.     Consultants:      Cardiologist Dr. Garcia    Procedures:        None    Discharge Medications:           Medication List      START taking these medications      Instructions   DILTIAZem  MG Cp24  Start taking on:  6/9/2019  Commonly known as:  CARDIZEM CD   Take 1 Cap by mouth every day.  Dose:  120 mg        CHANGE how you take these medications      Instructions   furosemide 40 MG Tabs  What changed:  · medication strength  · how much to take  Commonly known as:  LASIX   Take 1 Tab by mouth every day.  Dose:  40 mg        CONTINUE taking these medications      Instructions   albuterol 108 (90 Base) MCG/ACT Aers inhalation aerosol   Inhale 2 Puffs by mouth every four hours as needed for Shortness of Breath.  Dose:  2 Puff     amitriptyline 50 MG Tabs  Commonly known as:  ELAVIL   Take 50 mg by mouth every bedtime.  Dose:  50 mg     cyclobenzaprine 10 MG Tabs  Commonly known as:  FLEXERIL   Take 10 mg by mouth every bedtime.  Dose:  10 mg     DULoxetine 60 MG Cpep delayed-release capsule  Commonly known as:  CYMBALTA   TAKE 1 CAPSULE BY MOUTH DAILY     insulin glargine 100 UNIT/ML Soln  Commonly known as:  LANTUS   Inject 20 Units as instructed every evening.  Dose:  20 Units     LACTULOSE PO   Take  by mouth as needed.     levothyroxine 75 MCG Tabs  Commonly known as:  SYNTHROID   TAKE 1 TABLET BY MOUTH DAILY     oxyCODONE immediate release 10 MG immediate release tablet  Commonly known as:  ROXICODONE   Take 1 Tab by mouth every four hours as needed for Moderate Pain for up to 30 days.  Dose:  10 mg     rosuvastatin 10 MG Tabs  Commonly known as:  CRESTOR   Take 10 mg by mouth every morning.  Dose:  10 mg     traZODone 150 MG Tabs  Commonly known as:  DESYREL   Take 150 mg by mouth every  bedtime.  Dose:  150 mg     TRELEGY ELLIPTA 100-62.5-25 MCG/INH Aepb  Generic drug:  Fluticasone-Umeclidin-Vilant   Inhale 1 Puff by mouth every day.  Dose:  1 Puff        STOP taking these medications    amLODIPine 10 MG Tabs  Commonly known as:  NORVASC            Disposition:   Discharge home    Activity:   As tolerated    Code status:   Full code    Primary Care Provider:    Kavitha Mccall M.D.    Follow up appointment details :      Hai Ibarra M.D.  [address] [place] [address]  [phone]    Schedule an appointment as soon as possible for a visit  make appt for outpt colonoscopy, they are expecting your call, make follow up appointment    Kavitha Mccall M.D.  [address] [place] [id number] [phone]    Schedule an appointment as soon as possible for a visit in 1 week  for hospital follow up    Future Appointments  Date Time Provider Department Center   6/12/2019 9:40 AM MARLO Vazquez [facility]   6/26/2019 2:20 PM Kavitha [facility]ccall M.D. [facility]   7/1/2019 10:30 AM Jocelyn Alcantara M.D. [facility] None       Pending Studies:        None    Time spent on discharge day patient visit: 44 minutes    #################################################    Interval history/exam for day of discharge:    Vitals:    06/07/19 2018 06/07/19 2334 06/08/19 0405 06/08/19 0800   BP: 144/63 147/62 160/64 146/60   Pulse: 66 75 70 63   Resp: 18 17 17 18   Temp: 36.1 °C (97 °F) 36.3 °C (97.4 °F) 36.2 °C (97.1 °F) 36.1 °C (96.9 °F)   TempSrc: Temporal Temporal Temporal Temporal   SpO2: 97% 95% 94% 97%   Weight: 100.3 kg (221 lb 1.9 oz)      Height:         Weight/BMI: Body mass index is 36.8 kg/m².  Pulse Oximetry: 97 %, O2 (LPM): 4, O2 Delivery: Silicone Nasal Cannula    General:  NAD, obese  CVS:  RRR  PULM:  CTAB, no respiratory distress  Ext: Edema left greater than right improving    Most Recent Labs:    Lab Results   Component Value Date/Time    WBC 11.8 (H) 06/08/2019 03:39  AM    RBC 2.96 (L) 06/08/2019 03:39 AM    HEMOGLOBIN 8.1 (L) 06/08/2019 03:39 AM    HEMATOCRIT 26.8 (L) 06/08/2019 03:39 AM    MCV 90.5 06/08/2019 03:39 AM    MCH 27.4 06/08/2019 03:39 AM    MCHC 30.2 (L) 06/08/2019 03:39 AM    MPV 8.8 (L) 06/08/2019 03:39 AM    NEUTSPOLYS 79.90 (H) 06/03/2019 09:24 PM    LYMPHOCYTES 8.90 (L) 06/03/2019 09:24 PM    MONOCYTES 9.00 06/03/2019 09:24 PM    EOSINOPHILS 0.50 06/03/2019 09:24 PM    BASOPHILS 0.20 06/03/2019 09:24 PM    HYPOCHROMIA 1+ 06/18/2017 02:26 AM    ANISOCYTOSIS 1+ 04/19/2019 02:30 AM      Lab Results   Component Value Date/Time    SODIUM 140 06/08/2019 03:39 AM    POTASSIUM 4.3 06/08/2019 03:39 AM    CHLORIDE 104 06/08/2019 03:39 AM    CO2 27 06/08/2019 03:39 AM    GLUCOSE 163 (H) 06/08/2019 03:39 AM    BUN 53 (H) 06/08/2019 03:39 AM    CREATININE 1.90 (H) 06/08/2019 03:39 AM      Lab Results   Component Value Date/Time    ALTSGPT 19 06/03/2019 09:24 PM    ASTSGOT 24 06/03/2019 09:24 PM    ALKPHOSPHAT 43 06/03/2019 09:24 PM    TBILIRUBIN 0.5 06/03/2019 09:24 PM    LIPASE 27 11/11/2017 10:53 PM    ALBUMIN 3.1 (L) 06/08/2019 03:39 AM    ALBUMIN 3.56 (L) 08/15/2017 03:30 PM    GLOBULIN 2.4 06/03/2019 09:24 PM    PREALBUMIN 29.0 07/03/2017 03:26 AM    INR 1.09 02/08/2019 12:18 PM    MACROCYTOSIS 1+ 04/19/2019 02:30 AM     Lab Results   Component Value Date/Time    PROTHROMBTM 14.2 02/08/2019 12:18 PM    INR 1.09 02/08/2019 12:18 PM        Imaging/ Testing:      EC-ECHOCARDIOGRAM COMPLETE W/O CONT   Final Result      DX-ANKLE 2- VIEWS LEFT   Final Result      1.  Ankle swelling. No acute osseous abnormality.   2.  Stable sequela of distal tibial and fibular ORIF. Hardware is in stable position. No evidence of hardware complication.      DX-CHEST-PORTABLE (1 VIEW)   Final Result      Hazy bibasilar opacities, possibly related to overlying soft tissues. Overlying atelectasis or early pneumonia cannot be excluded.          Instructions:      The patient was instructed to  return to the ER in the event of worsening symptoms. I have counseled the patient on the importance of compliance and the patient has agreed to proceed with all medical recommendations and follow up plan indicated above.   The patient understands that all medications come with benefits and risks. Risks may include permanent injury or death and these risks can be minimized with close reassessment and monitoring.

## 2019-06-08 NOTE — THERAPY
"Occupational Therapy Evaluation completed.   Functional Status:    Pleasant 67 y/o female admitted with SOB, hx with COPD. Pt completed bed mobility with supv, sit><stand supv, ambulated to bathroom without AD and noted to reach out to various items for support. Toilet xfer with supv using grab bar. Mod I pericare. Pt reports that she will be moving into an ADL once she is D/C'd from here, and she will have assist with IADL and some ADL. Pt has all AE that she will need already. No further acute OT needs at this time.    Plan of Care: Patient with no further skilled OT needs in the acute care setting at this time  Discharge Recommendations:  Equipment: No Equipment Needed. Post-acute therapy Currently anticipate no further skilled therapy needs once patient is discharged from the inpatient setting.    See \"Rehab Therapy-Acute\" Patient Summary Report for complete documentation.    "

## 2019-06-08 NOTE — CARE PLAN
Problem: Safety  Goal: Will remain free from injury  Outcome: PROGRESSING AS EXPECTED  Patient will remain free of injury or falls.     Problem: Bowel/Gastric:  Goal: Normal bowel function is maintained or improved  Outcome: PROGRESSING AS EXPECTED  Bowel sounds active.

## 2019-06-08 NOTE — PROGRESS NOTES
NAD noted this shift. VSS. Patient denied any pain this shift. Patient given DC instructions and education. Patient verbalized understanding of teaching and denied any questions or concerns. Patient discharged on home O2 and escorted to taxi by staff.

## 2019-06-08 NOTE — PROGRESS NOTES
Pt expressed concern about going home, stated that she did not feel ready to go and found the process or possibility of getting ready for an assisted living overwhelming. May resist discharge.     Pt is having difficulty with food choices and portion sizes asked for multiple large snacks at night.

## 2019-06-09 LAB
BACTERIA BLD CULT: NORMAL
SIGNIFICANT IND 70042: NORMAL
SITE SITE: NORMAL
SOURCE SOURCE: NORMAL

## 2019-06-10 ENCOUNTER — PATIENT OUTREACH (OUTPATIENT)
Dept: HEALTH INFORMATION MANAGEMENT | Facility: OTHER | Age: 69
End: 2019-06-10

## 2019-06-11 ENCOUNTER — PATIENT OUTREACH (OUTPATIENT)
Dept: HEALTH INFORMATION MANAGEMENT | Facility: OTHER | Age: 69
End: 2019-06-11

## 2019-06-12 ENCOUNTER — OFFICE VISIT (OUTPATIENT)
Dept: MEDICAL GROUP | Facility: PHYSICIAN GROUP | Age: 69
End: 2019-06-12
Payer: MEDICARE

## 2019-06-12 ENCOUNTER — HOSPITAL ENCOUNTER (OUTPATIENT)
Dept: LAB | Facility: MEDICAL CENTER | Age: 69
DRG: 193 | End: 2019-06-12
Attending: NURSE PRACTITIONER
Payer: MEDICARE

## 2019-06-12 VITALS
SYSTOLIC BLOOD PRESSURE: 144 MMHG | OXYGEN SATURATION: 90 % | TEMPERATURE: 99.1 F | BODY MASS INDEX: 40.32 KG/M2 | WEIGHT: 242 LBS | RESPIRATION RATE: 20 BRPM | DIASTOLIC BLOOD PRESSURE: 90 MMHG | HEART RATE: 60 BPM | HEIGHT: 65 IN

## 2019-06-12 DIAGNOSIS — J44.1 ACUTE EXACERBATION OF CHRONIC OBSTRUCTIVE PULMONARY DISEASE (COPD) (HCC): ICD-10-CM

## 2019-06-12 DIAGNOSIS — N18.30 CHRONIC KIDNEY DISEASE, STAGE 3 (HCC): ICD-10-CM

## 2019-06-12 DIAGNOSIS — Z11.1 PPD SCREENING TEST: ICD-10-CM

## 2019-06-12 DIAGNOSIS — J96.21 ACUTE ON CHRONIC RESPIRATORY FAILURE WITH HYPOXIA (HCC): ICD-10-CM

## 2019-06-12 DIAGNOSIS — Z12.11 SCREENING FOR COLON CANCER: ICD-10-CM

## 2019-06-12 DIAGNOSIS — D64.9 ANEMIA REQUIRING TRANSFUSIONS: ICD-10-CM

## 2019-06-12 LAB
ANION GAP SERPL CALC-SCNC: 10 MMOL/L (ref 0–11.9)
ANISOCYTOSIS BLD QL SMEAR: ABNORMAL
BASOPHILS # BLD AUTO: 0.6 % (ref 0–1.8)
BASOPHILS # BLD: 0.08 K/UL (ref 0–0.12)
BUN SERPL-MCNC: 48 MG/DL (ref 8–22)
CALCIUM SERPL-MCNC: 8.5 MG/DL (ref 8.5–10.5)
CHLORIDE SERPL-SCNC: 101 MMOL/L (ref 96–112)
CO2 SERPL-SCNC: 27 MMOL/L (ref 20–33)
COMMENT 1642: NORMAL
CREAT SERPL-MCNC: 1.9 MG/DL (ref 0.5–1.4)
EOSINOPHIL # BLD AUTO: 0.19 K/UL (ref 0–0.51)
EOSINOPHIL NFR BLD: 1.3 % (ref 0–6.9)
ERYTHROCYTE [DISTWIDTH] IN BLOOD BY AUTOMATED COUNT: 60.5 FL (ref 35.9–50)
GLUCOSE SERPL-MCNC: 182 MG/DL (ref 65–99)
HCT VFR BLD AUTO: 27.5 % (ref 37–47)
HGB BLD-MCNC: 8 G/DL (ref 12–16)
IMM GRANULOCYTES # BLD AUTO: 0.28 K/UL (ref 0–0.11)
IMM GRANULOCYTES NFR BLD AUTO: 2 % (ref 0–0.9)
LYMPHOCYTES # BLD AUTO: 1.51 K/UL (ref 1–4.8)
LYMPHOCYTES NFR BLD: 10.5 % (ref 22–41)
MCH RBC QN AUTO: 27.6 PG (ref 27–33)
MCHC RBC AUTO-ENTMCNC: 29.1 G/DL (ref 33.6–35)
MCV RBC AUTO: 94.8 FL (ref 81.4–97.8)
MICROCYTES BLD QL SMEAR: ABNORMAL
MONOCYTES # BLD AUTO: 0.92 K/UL (ref 0–0.85)
MONOCYTES NFR BLD AUTO: 6.4 % (ref 0–13.4)
MORPHOLOGY BLD-IMP: NORMAL
NEUTROPHILS # BLD AUTO: 11.34 K/UL (ref 2–7.15)
NEUTROPHILS NFR BLD: 79.2 % (ref 44–72)
NRBC # BLD AUTO: 0 K/UL
NRBC BLD-RTO: 0 /100 WBC
PLATELET # BLD AUTO: 266 K/UL (ref 164–446)
PLATELET BLD QL SMEAR: NORMAL
PMV BLD AUTO: 9.5 FL (ref 9–12.9)
POLYCHROMASIA BLD QL SMEAR: NORMAL
POTASSIUM SERPL-SCNC: 4.4 MMOL/L (ref 3.6–5.5)
RBC # BLD AUTO: 2.9 M/UL (ref 4.2–5.4)
RBC BLD AUTO: PRESENT
SODIUM SERPL-SCNC: 138 MMOL/L (ref 135–145)
WBC # BLD AUTO: 14.3 K/UL (ref 4.8–10.8)

## 2019-06-12 PROCEDURE — 36415 COLL VENOUS BLD VENIPUNCTURE: CPT

## 2019-06-12 PROCEDURE — 85025 COMPLETE CBC W/AUTO DIFF WBC: CPT

## 2019-06-12 PROCEDURE — 86480 TB TEST CELL IMMUN MEASURE: CPT

## 2019-06-12 PROCEDURE — 80048 BASIC METABOLIC PNL TOTAL CA: CPT

## 2019-06-12 PROCEDURE — 99214 OFFICE O/P EST MOD 30 MIN: CPT | Performed by: NURSE PRACTITIONER

## 2019-06-12 ASSESSMENT — PAIN SCALES - GENERAL: PAINLEVEL: NO PAIN

## 2019-06-12 NOTE — ASSESSMENT & PLAN NOTE
This is a chronic condition, recently hospitalized for COPD exacerbation and anemia.  She is to have repeat labs done before she follows up with her PCP as scheduled in a couple weeks.

## 2019-06-12 NOTE — ASSESSMENT & PLAN NOTE
Patient recently hospitalized for COPD exacerbation, found to be severely anemic in which she required blood transfusions.  Would like to have her repeat labs before she follows up with her PCP in 2 weeks.

## 2019-06-12 NOTE — PROGRESS NOTES
"Chief Complaint   Patient presents with   • Follow-Up     [facility] pt states that she was discharged 6/8/19         This is a 68 y.o.female patient that presents today with the following: Post hospital follow-up    Chronic kidney disease, stage 3 (HCC)  This is a chronic condition, recently hospitalized for COPD exacerbation and anemia.  She is to have repeat labs done before she follows up with her PCP as scheduled in a couple weeks.    Anemia requiring transfusions  Patient recently hospitalized for COPD exacerbation, found to be severely anemic in which she required blood transfusions.  Would like to have her repeat labs before she follows up with her PCP in 2 weeks.    Acute on chronic respiratory failure with hypoxia (HCC)  This is a chronic condition, related to COPD.  She is on continuous O2 via nasal cannula.    Acute exacerbation of chronic obstructive pulmonary disease (COPD) (HCC)  Patient recently hospitalized for acute exacerbation of COPD she is also found to be severely anemic.  She feels that her breathing is better but she is tired.  She does have an upcoming appointment with her PCP in 2 weeks.  She is closely followed by pulmonology.  O2 saturations are 90%.    PPD screening test  Patient needs repeat PPD screening, QuantiFERON gold during hospitalization was equivocal, this will be repeated she does need results faxed to [facility] where she will be moving to soon.      Admission on 06/03/2019, Discharged on 06/08/2019   No results displayed because visit has over 200 results.            clinical course has been stable    Past Medical History:   Diagnosis Date   • Arthritis     \"everywhere\"   • ASTHMA    • Backpain     chronic back pain   • Breath shortness     \"only with flare up with allergies\"   • Bronchitis     as a child   • Congestive heart failure (HCC) 2013    related to pulmonary failure   • COPD    • Diabetes     Type 2   • Disorder of thyroid     Hypothyroid   • Fall    • " "Fibromyalgia    • GERD (gastroesophageal reflux disease)    • Heart burn    • Hepatitis C     \"I have markers in blood but not active\"   • Hypertension    • Indigestion    • Infectious disease     Hepatitis C   • Neuropathy (HCC)     bilat feet   • On home oxygen therapy    • Other specified symptom associated with female genital organs     Hysterectomy at age 22yrs   • Pain 9/13/2016    \"pain everywhere\"   • PND (post-nasal drip)    • Pneumonia     as a child   • Psychiatric problem 9/13/2016    PTSD   • RLS (restless legs syndrome)    • Sleep apnea     does not wear CPAP, \"can't do it\"   • Unspecified urinary incontinence        Past Surgical History:   Procedure Laterality Date   • ANKLE ORIF Left 5/8/2017    Procedure: ANKLE ORIF;  Surgeon: Rico Gtz M.D.;  Location: [facility];  Service:    • HI DSTR NROLYTC AGNT PARVERTEB FCT SNGL LMBR/SACRAL Left 9/16/2016    Procedure: NEURO DEST FACET L/S W/IG SNGL - L3-S1;  Surgeon: Xavier Arteaga D.O.;  Location: [facility];  Service: Pain Management   • HI DSTR NROLYTC AGNT PARVERTEB FCT ADDL LMBR/SACRAL  9/16/2016    Procedure: NEURO DEST FACET L/S W/IG ADDL;  Surgeon: Xavier Arteaga D.O.;  Location: [facility];  Service: Pain Management   • HI DSTR NROLYTC AGNT PARVERTEB FCT ADDL LMBR/SACRAL  9/16/2016    Procedure: NEURO DEST FACET L/S W/IG ADDL;  Surgeon: Xavier Arteaga D.O.;  Location: [facility];  Service: Pain Management   • PB FLUOROSCOPIC GUIDANCE NEEDLE PLACEMENT  9/16/2016    Procedure: FLOURO GUIDE NEEDLE PLACEMENT;  Surgeon: Xavier Arteaga D.O.;  Location: [facility];  Service: Pain Management   • HI DSTR NROLYTC AGNT PARVERTEB FCT SNGL LMBR/SACRAL Right 11/6/2015    Procedure: NEURO DEST FACET L/S W/IG SNGL - L3-S1;  Surgeon: Xavier Arteaga D.O.;  Location: [facility];  Service: Pain Management   • HI DSTR NROLYTC AGNT PARVERTEB FCT ADDL " LMBR/SACRAL  11/6/2015    Procedure: NEURO DEST FACET L/S W/IG ADDL;  Surgeon: Xavier Arteaga D.O.;  [facility];  Service: Pain Management   • PB INJECT RX OTHER PERIPH NERVE  11/6/2015    Procedure: NEUROLYTIC DEST-OTHER NERVE;  Surgeon: Xavier Arteaga D.O.;  Location: [facility];  Service: Pain Management   • CA DSTR NROLYTC AGNT PARVERTEB FCT ADDL LMBR/SACRAL  11/6/2015    Procedure: NEURO DEST FACET L/S W/IG ADDL;  Surgeon: Xavier Arteaga D.O.;  Location: [facility];  Service: Pain Management   • CA DSTR NROLYTC AGNT PARVERTEB FCT SNGL LMBR/SACRAL Left 10/2/2015    Procedure: NEURO DEST FACET L/S W/IG SNGL - L3-S1;  Surgeon: Xavier Arteaga D.O.;  Location: [facility];  Service: Pain Management   • CA DSTR NROLYTC AGNT PARVERTEB FCT ADDL LMBR/SACRAL  10/2/2015    Procedure: NEURO DEST FACET L/S W/IG ADDL;  Surgeon: Xavier Arteaga D.O.;  Location: [facility];  Service: Pain Management   • PB INJECT RX OTHER PERIPH NERVE  10/2/2015    Procedure: NEUROLYTIC DEST-OTHER NERVE;  Surgeon: Xavier Arteaga D.O.;  Location: [facility];  Service: Pain Management   • CA DSTR NROLYTC AGNT PARVERTEB FCT ADDL LMBR/SACRAL  10/2/2015    Procedure: NEURO DEST FACET L/S W/IG ADDL;  Surgeon: Xavier Arteaga D.O.;  Location: [facility];  Service: Pain Management   • PB INJ DX/THER AGNT PARAVERT FACET JOINT, OBED* Left 7/17/2015    Procedure: INJ PARA FACET L/S 1 LVL W/IG - L3-S1;  Surgeon: Xavier Arteaga D.O.;  Location: [facility];  Service: Pain Management   • PB INJ DX/THER AGNT PARAVERT FACET JOINT, OBED*  7/17/2015    Procedure: INJ PARA FACET L/S 2D LVL W/IG;  Surgeon: Xavier Arteaga D.O.;  [facility]: [facility];  Service: Pain Management   • PB INJ DX/THER AGNT PARAVERT FACET JOINT, OBED*  7/17/2015    Procedure: INJ PARA FACET L/S 3D LVL W/IG;  Surgeon:  Xavier Arteaga D.O.;  Location: [facility];  Service: Pain Management   • PB INJECT NERV BLCK,OTHR PERIPH NERV  7/17/2015    Procedure: INJ-ANES AGENT-OTHER PHER.NRVE;  Surgeon: Xavier Arteaga D.O.;  Location: [facility];  Service: Pain Management   • PB INJ DX/THER AGNT PARAVERT FACET JOINT, OBED* Left 7/10/2015    Procedure: INJ PARA FACET L/S 1 LVL W/IG - L3-S1;  Surgeon: Xavier Arteaga D.O.;  Location: [facility];  Service: Pain Management   • PB INJ DX/THER AGNT PARAVERT FACET JOINT, OBED*  7/10/2015    Procedure: INJ PARA FACET L/S 2D LVL W/IG;  Surgeon: Xavier Atreaga D.O.;  Location: [facility];  Service: Pain Management   • PB INJ DX/THER AGNT PARAVERT FACET JOINT, OBED*  7/10/2015    Procedure: INJ PARA FACET L/S 3D LVL W/IG;  Surgeon: Xavier Arteaga D.O.;  Location: [facility];  Service: Pain Management   • PB INJECT NERV BLCK,OTHR PERIPH NERV  7/10/2015    Procedure: INJ-ANES AGENT-OTHER PHER.NRVE;  Surgeon: Xavier Arteaga D.O.;  Location: [facility];  Service: Pain Management   • GYN SURGERY      hysterectomy    • LUMPECTOMY Left     Left breast   • OTHER ORTHOPEDIC SURGERY      L knee replacement    • OTHER ORTHOPEDIC SURGERY      R carpal tunnel    • US-NEEDLE CORE BX-BREAST PANEL         Family History   Problem Relation Age of Onset   • Lung Disease Mother    • Alcohol/Drug Mother    • Heart Disease Mother    • Cancer Father    • Cancer Brother    • Diabetes Brother    • Diabetes Maternal Grandmother    • Stroke Paternal Grandmother    • Heart Disease Paternal Grandmother        Vitamin c; Codeine; Gabapentin; Keflex; Lyrica; Sudafed; and Powders    Current Outpatient Prescriptions Ordered in [facility]   Medication Sig Dispense Refill   • DILTIAZem CD (CARDIZEM CD) 120 MG CAPSULE SR 24 HR Take 1 Cap by mouth every day. 30 Cap 2   • furosemide (LASIX) 40 MG Tab Take 1 Tab by mouth every day. 30 Tab  "2   • LACTULOSE PO Take  by mouth as needed.     • traZODone (DESYREL) 150 MG Tab Take 150 mg by mouth every bedtime.  3   • Fluticasone-Umeclidin-Vilant (TRELEGY ELLIPTA) 100-62.5-25 MCG/INH AEROSOL POWDER, BREATH ACTIVATED Inhale 1 Puff by mouth every day.     • insulin glargine (LANTUS) 100 UNIT/ML Solution Inject 20 Units as instructed every evening.     • rosuvastatin (CRESTOR) 10 MG Tab Take 10 mg by mouth every morning.     • albuterol 108 (90 Base) MCG/ACT Aero Soln inhalation aerosol Inhale 2 Puffs by mouth every four hours as needed for Shortness of Breath.  3   • cyclobenzaprine (FLEXERIL) 10 MG Tab Take 10 mg by mouth every bedtime.  2   • levothyroxine (SYNTHROID) 75 MCG Tab TAKE 1 TABLET BY MOUTH DAILY 90 Tab 3   • amitriptyline (ELAVIL) 50 MG Tab Take 50 mg by mouth every bedtime.  3   • DULoxetine (CYMBALTA) 60 MG Cap DR Particles delayed-release capsule TAKE 1 CAPSULE BY MOUTH DAILY 90 Cap 1     No current Epic-ordered facility-administered medications on file.        Constitutional ROS: No unexpected change in weight, No weakness, No unexplained fevers, sweats, or chills  Pulmonary ROS: Positive per HPI  Cardiovascular ROS: No chest pain, Positive for pulmonary hypertension, hyperlipidemia  Gastrointestinal ROS: No abdominal pain, No nausea, vomiting, diarrhea, or constipation  Hematologic ROS: Positive per HPI  Musculoskeletal/Extremities ROS: No clubbing, No peripheral edema, No pain, redness or swelling on the joints  Neurologic ROS: Normal development, No seizures, No weakness    Physical exam:  /90 (BP Location: Left arm, Patient Position: Sitting, BP Cuff Size: Adult)   Pulse 60   Temp 37.3 °C (99.1 °F) (Temporal)   Resp 20   Ht 1.651 m (5' 5\")   Wt 109.8 kg (242 lb)   SpO2 90%   Breastfeeding? No   BMI 40.27 kg/m²   General Appearance: Pleasant elderly female, alert, no distress, morbidly obese, well-groomed  Skin: Skin color, texture, turgor normal. No rashes or " lesions.  Lungs: positive findings: Decreased breath sounds throughout posteriorly with few scattered rhonchi, clears with coughing  Heart: negative. RRR without murmur, gallop, or rubs.  No ectopy.  Abdomen: Abdomen soft, non-tender. BS normal. No masses,  No organomegaly  Musculoskeletal: positive findings: Impaired gait, wheelchair/scooter bound  Neurologic: intact    Medical decision making/discussion: Patient to follow-up with her PCP as already scheduled appointment.  Labs have been ordered for follow-up of kidney function as well as anemia.  She was advised to follow-up with her specialist as scheduled.  She will do repeat QuantiFERON Gold blood test today for further evaluation, and screening for tuberculosis prior to moving into assisted living facility.  She does agree to referral to gastroenterology for colonoscopy.    Priya was seen today for follow-up.    Diagnoses and all orders for this visit:    Acute exacerbation of chronic obstructive pulmonary disease (COPD) (HCC)    Acute on chronic respiratory failure with hypoxia (HCC)    Anemia requiring transfusions  -     CBC WITH DIFFERENTIAL; Future    PPD screening test    -     Quantiferon Gold TB (PPD); Future    Screening for colon cancer  -     REFERRAL TO GI FOR COLONOSCOPY    Chronic kidney disease, stage 3 (HCC)  -     Basic Metabolic Panel; Future        Return in about 2 weeks (around 6/26/2019) for Follow-up, Discuss Labs.        Please note that this dictation was created using voice recognition software. I have made every reasonable attempt to correct obvious errors, but I expect that there are errors of grammar and possibly content that I did not discover before finalizing the note.

## 2019-06-12 NOTE — ASSESSMENT & PLAN NOTE
Patient recently hospitalized for acute exacerbation of COPD she is also found to be severely anemic.  She feels that her breathing is better but she is tired.  She does have an upcoming appointment with her PCP in 2 weeks.  She is closely followed by pulmonology.  O2 saturations are 90%.

## 2019-06-12 NOTE — ASSESSMENT & PLAN NOTE
Patient needs repeat PPD screening, QuantiFERON gold during hospitalization was equivocal, this will be repeated she does need results faxed to [facility] where she will be moving to soon.

## 2019-06-14 ENCOUNTER — HOSPITAL ENCOUNTER (INPATIENT)
Facility: MEDICAL CENTER | Age: 69
LOS: 6 days | DRG: 193 | End: 2019-06-21
Attending: EMERGENCY MEDICINE | Admitting: INTERNAL MEDICINE
Payer: MEDICARE

## 2019-06-14 ENCOUNTER — APPOINTMENT (OUTPATIENT)
Dept: RADIOLOGY | Facility: MEDICAL CENTER | Age: 69
DRG: 193 | End: 2019-06-14
Attending: EMERGENCY MEDICINE
Payer: MEDICARE

## 2019-06-14 DIAGNOSIS — D64.9 ANEMIA, UNSPECIFIED TYPE: ICD-10-CM

## 2019-06-14 DIAGNOSIS — R09.02 HYPOXIA: ICD-10-CM

## 2019-06-14 DIAGNOSIS — J44.9 CHRONIC OBSTRUCTIVE PULMONARY DISEASE, UNSPECIFIED COPD TYPE (HCC): Chronic | ICD-10-CM

## 2019-06-14 DIAGNOSIS — J44.1 ACUTE EXACERBATION OF CHRONIC OBSTRUCTIVE PULMONARY DISEASE (COPD) (HCC): ICD-10-CM

## 2019-06-14 LAB
ALBUMIN SERPL BCP-MCNC: 3.3 G/DL (ref 3.2–4.9)
ALBUMIN/GLOB SERPL: 1.4 G/DL
ALP SERPL-CCNC: 35 U/L (ref 30–99)
ALT SERPL-CCNC: 20 U/L (ref 2–50)
ANION GAP SERPL CALC-SCNC: 8 MMOL/L (ref 0–11.9)
AST SERPL-CCNC: 18 U/L (ref 12–45)
BASOPHILS # BLD AUTO: 0.3 % (ref 0–1.8)
BASOPHILS # BLD: 0.04 K/UL (ref 0–0.12)
BILIRUB SERPL-MCNC: 0.6 MG/DL (ref 0.1–1.5)
BUN SERPL-MCNC: 31 MG/DL (ref 8–22)
CALCIUM SERPL-MCNC: 8.7 MG/DL (ref 8.5–10.5)
CHLORIDE SERPL-SCNC: 99 MMOL/L (ref 96–112)
CO2 SERPL-SCNC: 28 MMOL/L (ref 20–33)
CREAT SERPL-MCNC: 2.1 MG/DL (ref 0.5–1.4)
EOSINOPHIL # BLD AUTO: 0.1 K/UL (ref 0–0.51)
EOSINOPHIL NFR BLD: 0.6 % (ref 0–6.9)
ERYTHROCYTE [DISTWIDTH] IN BLOOD BY AUTOMATED COUNT: 59 FL (ref 35.9–50)
GAMMA INTERFERON BACKGROUND BLD IA-ACNC: 0.02 IU/ML
GLOBULIN SER CALC-MCNC: 2.3 G/DL (ref 1.9–3.5)
GLUCOSE SERPL-MCNC: 274 MG/DL (ref 65–99)
HCT VFR BLD AUTO: 23.4 % (ref 37–47)
HGB BLD-MCNC: 7.3 G/DL (ref 12–16)
IMM GRANULOCYTES # BLD AUTO: 0.22 K/UL (ref 0–0.11)
IMM GRANULOCYTES NFR BLD AUTO: 1.4 % (ref 0–0.9)
LACTATE BLD-SCNC: 0.9 MMOL/L (ref 0.5–2)
LYMPHOCYTES # BLD AUTO: 1.05 K/UL (ref 1–4.8)
LYMPHOCYTES NFR BLD: 6.8 % (ref 22–41)
M TB IFN-G BLD-IMP: NEGATIVE
M TB IFN-G CD4+ BCKGRND COR BLD-ACNC: -0.01 IU/ML
MCH RBC QN AUTO: 28.5 PG (ref 27–33)
MCHC RBC AUTO-ENTMCNC: 31.2 G/DL (ref 33.6–35)
MCV RBC AUTO: 91.4 FL (ref 81.4–97.8)
MITOGEN IGNF BCKGRD COR BLD-ACNC: 2.03 IU/ML
MONOCYTES # BLD AUTO: 1.51 K/UL (ref 0–0.85)
MONOCYTES NFR BLD AUTO: 9.8 % (ref 0–13.4)
NEUTROPHILS # BLD AUTO: 12.53 K/UL (ref 2–7.15)
NEUTROPHILS NFR BLD: 81.1 % (ref 44–72)
NRBC # BLD AUTO: 0.04 K/UL
NRBC BLD-RTO: 0.3 /100 WBC
PLATELET # BLD AUTO: 238 K/UL (ref 164–446)
PMV BLD AUTO: 8.8 FL (ref 9–12.9)
POTASSIUM SERPL-SCNC: 4.5 MMOL/L (ref 3.6–5.5)
PROT SERPL-MCNC: 5.6 G/DL (ref 6–8.2)
QFT TB2 - NIL TBQ2: 0 IU/ML
RBC # BLD AUTO: 2.56 M/UL (ref 4.2–5.4)
SODIUM SERPL-SCNC: 135 MMOL/L (ref 135–145)
WBC # BLD AUTO: 15.5 K/UL (ref 4.8–10.8)

## 2019-06-14 PROCEDURE — 84145 PROCALCITONIN (PCT): CPT

## 2019-06-14 PROCEDURE — 83605 ASSAY OF LACTIC ACID: CPT

## 2019-06-14 PROCEDURE — 304561 HCHG STAT O2

## 2019-06-14 PROCEDURE — 71045 X-RAY EXAM CHEST 1 VIEW: CPT

## 2019-06-14 PROCEDURE — 80053 COMPREHEN METABOLIC PANEL: CPT

## 2019-06-14 PROCEDURE — 85025 COMPLETE CBC W/AUTO DIFF WBC: CPT

## 2019-06-14 PROCEDURE — 700101 HCHG RX REV CODE 250: Performed by: EMERGENCY MEDICINE

## 2019-06-14 PROCEDURE — 84484 ASSAY OF TROPONIN QUANT: CPT

## 2019-06-14 PROCEDURE — 94640 AIRWAY INHALATION TREATMENT: CPT

## 2019-06-14 PROCEDURE — 83880 ASSAY OF NATRIURETIC PEPTIDE: CPT

## 2019-06-14 PROCEDURE — 83735 ASSAY OF MAGNESIUM: CPT

## 2019-06-14 PROCEDURE — 87077 CULTURE AEROBIC IDENTIFY: CPT

## 2019-06-14 PROCEDURE — 36415 COLL VENOUS BLD VENIPUNCTURE: CPT

## 2019-06-14 PROCEDURE — 93005 ELECTROCARDIOGRAM TRACING: CPT | Performed by: EMERGENCY MEDICINE

## 2019-06-14 PROCEDURE — 99285 EMERGENCY DEPT VISIT HI MDM: CPT

## 2019-06-14 PROCEDURE — 87040 BLOOD CULTURE FOR BACTERIA: CPT | Mod: 91

## 2019-06-14 RX ADMIN — ALBUTEROL SULFATE 2.5 MG: 5 SOLUTION RESPIRATORY (INHALATION) at 23:29

## 2019-06-14 ASSESSMENT — LIFESTYLE VARIABLES
DO YOU DRINK ALCOHOL: NO
EVER_SMOKED: YES

## 2019-06-15 PROBLEM — I50.31 ACUTE DIASTOLIC CONGESTIVE HEART FAILURE (HCC): Status: ACTIVE | Noted: 2019-06-15

## 2019-06-15 PROBLEM — D64.9 ANEMIA: Status: ACTIVE | Noted: 2019-06-15

## 2019-06-15 PROBLEM — E66.01 CLASS 3 SEVERE OBESITY DUE TO EXCESS CALORIES WITH SERIOUS COMORBIDITY AND BODY MASS INDEX (BMI) OF 40.0 TO 44.9 IN ADULT (HCC): Status: ACTIVE | Noted: 2018-12-20

## 2019-06-15 LAB
ABO GROUP BLD: ABNORMAL
AMORPH CRY #/AREA URNS HPF: PRESENT /HPF
APPEARANCE UR: ABNORMAL
APTT PPP: 32 SEC (ref 24.7–36)
BACTERIA #/AREA URNS HPF: NEGATIVE /HPF
BARCODED ABORH UBTYP: 9500
BARCODED PRD CODE UBPRD: ABNORMAL
BARCODED UNIT NUM UBUNT: ABNORMAL
BILIRUB UR QL STRIP.AUTO: NEGATIVE
BLD GP AB SCN SERPL QL: ABNORMAL
BNP SERPL-MCNC: 238 PG/ML (ref 0–100)
COLOR UR: ABNORMAL
COMPONENT R 8504R: ABNORMAL
EKG IMPRESSION: NORMAL
EPI CELLS #/AREA URNS HPF: NORMAL /HPF
GLUCOSE BLD-MCNC: 169 MG/DL (ref 65–99)
GLUCOSE UR STRIP.AUTO-MCNC: NEGATIVE MG/DL
GRAM STN SPEC: NORMAL
HGB BLD-MCNC: 7 G/DL (ref 12–16)
HGB BLD-MCNC: 8.1 G/DL (ref 12–16)
INR PPP: 1.09 (ref 0.87–1.13)
KETONES UR STRIP.AUTO-MCNC: ABNORMAL MG/DL
LEUKOCYTE ESTERASE UR QL STRIP.AUTO: NEGATIVE
MAGNESIUM SERPL-MCNC: 2 MG/DL (ref 1.5–2.5)
MICRO URNS: ABNORMAL
NITRITE UR QL STRIP.AUTO: NEGATIVE
PH UR STRIP.AUTO: 5 [PH]
PROCALCITONIN SERPL-MCNC: 0.65 NG/ML
PRODUCT TYPE UPROD: ABNORMAL
PROT UR QL STRIP: 300 MG/DL
PROTHROMBIN TIME: 14.3 SEC (ref 12–14.6)
RBC # URNS HPF: NORMAL /HPF
RBC UR QL AUTO: NEGATIVE
RH BLD: ABNORMAL
SIGNIFICANT IND 70042: NORMAL
SITE SITE: NORMAL
SOURCE SOURCE: NORMAL
SP GR UR STRIP.AUTO: 1.02
TROPONIN I SERPL-MCNC: 0.05 NG/ML (ref 0–0.04)
UNIT STATUS USTAT: ABNORMAL
UROBILINOGEN UR STRIP.AUTO-MCNC: 1 MG/DL
WBC #/AREA URNS HPF: NORMAL /HPF

## 2019-06-15 PROCEDURE — 700111 HCHG RX REV CODE 636 W/ 250 OVERRIDE (IP): Performed by: INTERNAL MEDICINE

## 2019-06-15 PROCEDURE — 700105 HCHG RX REV CODE 258: Performed by: INTERNAL MEDICINE

## 2019-06-15 PROCEDURE — 86900 BLOOD TYPING SEROLOGIC ABO: CPT

## 2019-06-15 PROCEDURE — 86923 COMPATIBILITY TEST ELECTRIC: CPT

## 2019-06-15 PROCEDURE — 87070 CULTURE OTHR SPECIMN AEROBIC: CPT

## 2019-06-15 PROCEDURE — 85018 HEMOGLOBIN: CPT

## 2019-06-15 PROCEDURE — 81001 URINALYSIS AUTO W/SCOPE: CPT

## 2019-06-15 PROCEDURE — 96365 THER/PROPH/DIAG IV INF INIT: CPT

## 2019-06-15 PROCEDURE — 700101 HCHG RX REV CODE 250: Performed by: EMERGENCY MEDICINE

## 2019-06-15 PROCEDURE — 86850 RBC ANTIBODY SCREEN: CPT

## 2019-06-15 PROCEDURE — 51702 INSERT TEMP BLADDER CATH: CPT

## 2019-06-15 PROCEDURE — 85610 PROTHROMBIN TIME: CPT

## 2019-06-15 PROCEDURE — 87086 URINE CULTURE/COLONY COUNT: CPT

## 2019-06-15 PROCEDURE — 85730 THROMBOPLASTIN TIME PARTIAL: CPT

## 2019-06-15 PROCEDURE — A9270 NON-COVERED ITEM OR SERVICE: HCPCS | Performed by: INTERNAL MEDICINE

## 2019-06-15 PROCEDURE — 770020 HCHG ROOM/CARE - TELE (206)

## 2019-06-15 PROCEDURE — 82962 GLUCOSE BLOOD TEST: CPT

## 2019-06-15 PROCEDURE — 99223 1ST HOSP IP/OBS HIGH 75: CPT | Performed by: INTERNAL MEDICINE

## 2019-06-15 PROCEDURE — 303105 HCHG CATHETER EXTRA

## 2019-06-15 PROCEDURE — 86901 BLOOD TYPING SEROLOGIC RH(D): CPT

## 2019-06-15 PROCEDURE — 96367 TX/PROPH/DG ADDL SEQ IV INF: CPT

## 2019-06-15 PROCEDURE — 700111 HCHG RX REV CODE 636 W/ 250 OVERRIDE (IP): Performed by: EMERGENCY MEDICINE

## 2019-06-15 PROCEDURE — 87205 SMEAR GRAM STAIN: CPT

## 2019-06-15 PROCEDURE — P9016 RBC LEUKOCYTES REDUCED: HCPCS

## 2019-06-15 PROCEDURE — 700105 HCHG RX REV CODE 258: Performed by: EMERGENCY MEDICINE

## 2019-06-15 PROCEDURE — 36415 COLL VENOUS BLD VENIPUNCTURE: CPT

## 2019-06-15 PROCEDURE — 700102 HCHG RX REV CODE 250 W/ 637 OVERRIDE(OP): Performed by: FAMILY MEDICINE

## 2019-06-15 PROCEDURE — 36430 TRANSFUSION BLD/BLD COMPNT: CPT

## 2019-06-15 PROCEDURE — 700102 HCHG RX REV CODE 250 W/ 637 OVERRIDE(OP): Performed by: INTERNAL MEDICINE

## 2019-06-15 PROCEDURE — 94760 N-INVAS EAR/PLS OXIMETRY 1: CPT

## 2019-06-15 RX ORDER — DOXYCYCLINE 100 MG/1
100 TABLET ORAL EVERY 12 HOURS
Status: DISCONTINUED | OUTPATIENT
Start: 2019-06-15 | End: 2019-06-21 | Stop reason: HOSPADM

## 2019-06-15 RX ORDER — POLYETHYLENE GLYCOL 3350 17 G/17G
1 POWDER, FOR SOLUTION ORAL
Status: DISCONTINUED | OUTPATIENT
Start: 2019-06-15 | End: 2019-06-21 | Stop reason: HOSPADM

## 2019-06-15 RX ORDER — DULOXETIN HYDROCHLORIDE 60 MG/1
60 CAPSULE, DELAYED RELEASE ORAL DAILY
Status: DISCONTINUED | OUTPATIENT
Start: 2019-06-15 | End: 2019-06-21 | Stop reason: HOSPADM

## 2019-06-15 RX ORDER — AMOXICILLIN 250 MG
2 CAPSULE ORAL 2 TIMES DAILY
Status: DISCONTINUED | OUTPATIENT
Start: 2019-06-15 | End: 2019-06-21 | Stop reason: HOSPADM

## 2019-06-15 RX ORDER — ACETAMINOPHEN 325 MG/1
650 TABLET ORAL EVERY 6 HOURS PRN
Status: DISCONTINUED | OUTPATIENT
Start: 2019-06-15 | End: 2019-06-21 | Stop reason: HOSPADM

## 2019-06-15 RX ORDER — INSULIN GLARGINE 100 [IU]/ML
20 INJECTION, SOLUTION SUBCUTANEOUS NIGHTLY
Status: DISCONTINUED | OUTPATIENT
Start: 2019-06-15 | End: 2019-06-17

## 2019-06-15 RX ORDER — AMITRIPTYLINE HYDROCHLORIDE 25 MG/1
50 TABLET, FILM COATED ORAL
Status: DISCONTINUED | OUTPATIENT
Start: 2019-06-15 | End: 2019-06-21 | Stop reason: HOSPADM

## 2019-06-15 RX ORDER — SODIUM CHLORIDE 9 MG/ML
500 INJECTION, SOLUTION INTRAVENOUS
Status: DISCONTINUED | OUTPATIENT
Start: 2019-06-15 | End: 2019-06-21 | Stop reason: HOSPADM

## 2019-06-15 RX ORDER — LEVOTHYROXINE SODIUM 0.07 MG/1
75 TABLET ORAL
Status: DISCONTINUED | OUTPATIENT
Start: 2019-06-15 | End: 2019-06-21 | Stop reason: HOSPADM

## 2019-06-15 RX ORDER — ROSUVASTATIN CALCIUM 10 MG/1
10 TABLET, COATED ORAL EVERY MORNING
Status: DISCONTINUED | OUTPATIENT
Start: 2019-06-15 | End: 2019-06-21 | Stop reason: HOSPADM

## 2019-06-15 RX ORDER — NYSTATIN 100000 [USP'U]/G
POWDER TOPICAL 2 TIMES DAILY
Status: DISCONTINUED | OUTPATIENT
Start: 2019-06-15 | End: 2019-06-21 | Stop reason: HOSPADM

## 2019-06-15 RX ORDER — FLUTICASONE PROPIONATE 44 UG/1
2 AEROSOL, METERED RESPIRATORY (INHALATION) EVERY MORNING
Status: DISCONTINUED | OUTPATIENT
Start: 2019-06-15 | End: 2019-06-17

## 2019-06-15 RX ORDER — OMEPRAZOLE 20 MG/1
20 CAPSULE, DELAYED RELEASE ORAL 2 TIMES DAILY
Status: DISCONTINUED | OUTPATIENT
Start: 2019-06-15 | End: 2019-06-21 | Stop reason: HOSPADM

## 2019-06-15 RX ORDER — IPRATROPIUM BROMIDE AND ALBUTEROL SULFATE 2.5; .5 MG/3ML; MG/3ML
3 SOLUTION RESPIRATORY (INHALATION)
Status: DISCONTINUED | OUTPATIENT
Start: 2019-06-15 | End: 2019-06-21 | Stop reason: HOSPADM

## 2019-06-15 RX ORDER — BISACODYL 10 MG
10 SUPPOSITORY, RECTAL RECTAL
Status: DISCONTINUED | OUTPATIENT
Start: 2019-06-15 | End: 2019-06-21 | Stop reason: HOSPADM

## 2019-06-15 RX ADMIN — ACETAMINOPHEN 650 MG: 325 TABLET, FILM COATED ORAL at 13:27

## 2019-06-15 RX ADMIN — TRAZODONE HYDROCHLORIDE 150 MG: 50 TABLET ORAL at 22:36

## 2019-06-15 RX ADMIN — DOXYCYCLINE 100 MG: 100 INJECTION, POWDER, LYOPHILIZED, FOR SOLUTION INTRAVENOUS at 01:05

## 2019-06-15 RX ADMIN — DOXYCYCLINE 100 MG: 100 TABLET, FILM COATED ORAL at 13:43

## 2019-06-15 RX ADMIN — AMITRIPTYLINE HYDROCHLORIDE 50 MG: 25 TABLET, FILM COATED ORAL at 22:36

## 2019-06-15 RX ADMIN — CEFTRIAXONE SODIUM 2 G: 2 INJECTION, POWDER, FOR SOLUTION INTRAMUSCULAR; INTRAVENOUS at 00:02

## 2019-06-15 RX ADMIN — LEVOTHYROXINE SODIUM 75 MCG: 75 TABLET ORAL at 05:44

## 2019-06-15 RX ADMIN — NYSTATIN: 100000 POWDER TOPICAL at 18:46

## 2019-06-15 RX ADMIN — CEFTRIAXONE SODIUM 2 G: 2 INJECTION, POWDER, FOR SOLUTION INTRAMUSCULAR; INTRAVENOUS at 22:54

## 2019-06-15 RX ADMIN — OMEPRAZOLE 20 MG: 20 CAPSULE, DELAYED RELEASE ORAL at 05:44

## 2019-06-15 RX ADMIN — INSULIN GLARGINE 20 UNITS: 100 INJECTION, SOLUTION SUBCUTANEOUS at 22:36

## 2019-06-15 RX ADMIN — ROSUVASTATIN CALCIUM 10 MG: 10 TABLET, FILM COATED ORAL at 05:44

## 2019-06-15 RX ADMIN — SENNOSIDES,DOCUSATE SODIUM 2 TABLET: 8.6; 5 TABLET, FILM COATED ORAL at 18:46

## 2019-06-15 RX ADMIN — DULOXETINE HYDROCHLORIDE 60 MG: 60 CAPSULE, DELAYED RELEASE ORAL at 05:44

## 2019-06-15 ASSESSMENT — COGNITIVE AND FUNCTIONAL STATUS - GENERAL
PERSONAL GROOMING: A LITTLE
MOBILITY SCORE: 16
HELP NEEDED FOR BATHING: A LOT
DRESSING REGULAR LOWER BODY CLOTHING: A LITTLE
DAILY ACTIVITIY SCORE: 18
STANDING UP FROM CHAIR USING ARMS: A LITTLE
SUGGESTED CMS G CODE MODIFIER MOBILITY: CK
WALKING IN HOSPITAL ROOM: A LITTLE
TURNING FROM BACK TO SIDE WHILE IN FLAT BAD: A LITTLE
CLIMB 3 TO 5 STEPS WITH RAILING: A LOT
DRESSING REGULAR UPPER BODY CLOTHING: A LITTLE
MOVING TO AND FROM BED TO CHAIR: A LOT
TOILETING: A LITTLE
SUGGESTED CMS G CODE MODIFIER DAILY ACTIVITY: CK
MOVING FROM LYING ON BACK TO SITTING ON SIDE OF FLAT BED: A LITTLE

## 2019-06-15 ASSESSMENT — ENCOUNTER SYMPTOMS
DIZZINESS: 0
FEVER: 1
SEIZURES: 0
WHEEZING: 0
SORE THROAT: 0
FOCAL WEAKNESS: 0
DIAPHORESIS: 0
ABDOMINAL PAIN: 0
SPUTUM PRODUCTION: 1
MYALGIAS: 0
VOMITING: 0
HEADACHES: 0
COUGH: 1
BRUISES/BLEEDS EASILY: 0
SHORTNESS OF BREATH: 1
BLOOD IN STOOL: 0
NAUSEA: 0
CHILLS: 1
NECK PAIN: 0
FLANK PAIN: 0
DIARRHEA: 0
BLURRED VISION: 0
BACK PAIN: 0
PALPITATIONS: 0

## 2019-06-15 ASSESSMENT — LIFESTYLE VARIABLES
EVER_SMOKED: YES
EVER_SMOKED: YES
ALCOHOL_USE: NO

## 2019-06-15 ASSESSMENT — PATIENT HEALTH QUESTIONNAIRE - PHQ9
1. LITTLE INTEREST OR PLEASURE IN DOING THINGS: NOT AT ALL
2. FEELING DOWN, DEPRESSED, IRRITABLE, OR HOPELESS: NOT AT ALL
SUM OF ALL RESPONSES TO PHQ9 QUESTIONS 1 AND 2: 0

## 2019-06-15 ASSESSMENT — COPD QUESTIONNAIRES
COPD SCREENING SCORE: 8
DURING THE PAST 4 WEEKS HOW MUCH DID YOU FEEL SHORT OF BREATH: SOME OF THE TIME
DO YOU EVER COUGH UP ANY MUCUS OR PHLEGM?: YES, EVERY DAY
HAVE YOU SMOKED AT LEAST 100 CIGARETTES IN YOUR ENTIRE LIFE: YES

## 2019-06-15 NOTE — ASSESSMENT & PLAN NOTE
Seems pt has CKD stage III. Worse on admission due to Pneumonia ?? D/w  nephrology   renal ultrasound showed:No hydronephrosis is seen.  Improving back to her baseline. Continue to monitor   Avoid nephrotoxic medication. Continue to monitor

## 2019-06-15 NOTE — CARE PLAN
Problem: Venous Thromboembolism (VTW)/Deep Vein Thrombosis (DVT) Prevention:  Goal: Patient will participate in Venous Thrombosis (VTE)/Deep Vein Thrombosis (DVT)Prevention Measures  Outcome: PROGRESSING AS EXPECTED  Pt. Refused SCD's, she has her own CLAUDIA hose that are currently on. Pt. Also ambulated with a front wheel walker to the commode. Educated pt. On foot pumps.     Problem: Bowel/Gastric:  Goal: Normal bowel function is maintained or improved  Outcome: PROGRESSING AS EXPECTED  Pt. Got up and went to commode, had a small BM. Educated pt. On hydration and movement to promote BM's.

## 2019-06-15 NOTE — CARE PLAN
Problem: Oxygenation:  Goal: Maintain adequate oxygenation dependent on patient condition  Outcome: PROGRESSING AS EXPECTED    Intervention: Manage oxygen therapy by monitoring pulse oximetry and/or ABG values  Pt on baseline 4lpm/nc sating 91%; will continue to monitor and titrate as pt needes      Problem: Hyperinflation:  Goal: Prevent or improve atelectasis  Outcome: PROGRESSING AS EXPECTED    Intervention: Instruct incentive spirometry usage  Pt doing hyperinflation via IS for PNA; pulling 1500 60% 1400; pt has strong effective cough; encourage use of IS throughout the day

## 2019-06-15 NOTE — CARE PLAN
Problem: Communication  Goal: The ability to communicate needs accurately and effectively will improve  Outcome: PROGRESSING SLOWER THAN EXPECTED      Problem: Safety  Goal: Will remain free from falls  Outcome: PROGRESSING SLOWER THAN EXPECTED

## 2019-06-15 NOTE — ED PROVIDER NOTES
ED Provider Note    Scribed for Dr. Diego Cunha M.D. by Swati Forde. 6/14/2019  10:33 PM    Primary care provider: Kavitha Mccall M.D.  Means of arrival: EMS  History obtained from: Patient  History limited by: None    CHIEF COMPLAINT  Chief Complaint   Patient presents with   • Shortness of Breath       HPI  Priya Callahan is a 68 y.o. female who presents to the Emergency Department with a chief complaint of acute shortness of breath onset earlier today. The patient was recently discharged from this facility for the same chief complaint with alleviation of her symptoms, however her shortness of breath returned prompting her to return to the ED. She notes that she began to experience sudden dyspnea while sitting down and then called EMS. The patient has symptoms of a fever. En route, the patient received 2 albuterol treatments, tylenol 975 mg, and 1 duoneb with mild alleviation of her symptoms. No alleviating or exacerbating symptoms were reported. Negative nausea, vomiting, diarrhea. Pertinent medical history includes COPD, congestive heart failure and diabetes. The patient notes that she uses 4 liters of supplemental oxygen at home.  Patient was apparently quite hypoxic at home and also febrile    REVIEW OF SYSTEMS  Pertinent positives include shortness of breath, fevers. Pertinent negatives include no nausea, vomiting, diarrhea. As above, all other systems reviewed and are negative.   See HPI for further details.     PAST MEDICAL HISTORY  The patient has a past medical history of Arthritis; ASTHMA; Backpain; Breath shortness; Bronchitis; Congestive heart failure (HCC) (2013); COPD; Diabetes; Disorder of thyroid; Fall; Fibromyalgia; GERD (gastroesophageal reflux disease); Heart burn; Hepatitis C; Hypertension; Indigestion; Infectious disease; Neuropathy (HCC); On home oxygen therapy; Other specified symptom associated with female genital organs; Pain (9/13/2016); PND (post-nasal drip);  Pneumonia; Psychiatric problem (9/13/2016); RLS (restless legs syndrome); Sleep apnea; and Unspecified urinary incontinence.    SURGICAL HISTORY   The patient has a past surgical history that includes gyn surgery; other orthopedic surgery; other orthopedic surgery; us-needle core bx-breast panel; lumpectomy (Left); inj dx/ther agnt paravert facet joint, bruce* (Left, 7/10/2015); inj dx/ther agnt paravert facet joint, brcue* (7/10/2015); inj dx/ther agnt paravert facet joint, bruce* (7/10/2015); inject nerv blck,othr periph nerv (7/10/2015); inj dx/ther agnt paravert facet joint, bruce* (Left, 7/17/2015); inj dx/ther agnt paravert facet joint, bruce* (7/17/2015); inj dx/ther agnt paravert facet joint, bruce* (7/17/2015); inject nerv blck,othr periph nerv (7/17/2015); dstr nrolytc agnt parverteb fct sngl lmbr/sacral (Left, 10/2/2015); dstr nrolytc agnt parverteb fct addl lmbr/sacral (10/2/2015); inject rx other periph nerve (10/2/2015); dstr nrolytc agnt parverteb fct addl lmbr/sacral (10/2/2015); dstr nrolytc agnt parverteb fct sngl lmbr/sacral (Right, 11/6/2015); dstr nrolytc agnt parverteb fct addl lmbr/sacral (11/6/2015); inject rx other periph nerve (11/6/2015); dstr nrolytc agnt parverteb fct addl lmbr/sacral (11/6/2015); dstr nrolytc agnt parverteb fct sngl lmbr/sacral (Left, 9/16/2016); dstr nrolytc agnt parverteb fct addl lmbr/sacral (9/16/2016); dstr nrolytc agnt parverteb fct addl lmbr/sacral (9/16/2016); fluoroscopic guidance needle placement (9/16/2016); and ankle orif (Left, 5/8/2017).    SOCIAL HISTORY  Social History   Substance Use Topics   • Smoking status: Former Smoker     Packs/day: 1.00     Years: 50.00     Types: Cigarettes     Quit date: 3/20/2019   • Smokeless tobacco: Never Used   • Alcohol use No      History   Drug Use No       FAMILY HISTORY  Family History   Problem Relation Age of Onset   • Lung Disease Mother    • Alcohol/Drug Mother    • Heart Disease Mother    • Cancer Father    • Cancer Brother    •  "Diabetes Brother    • Diabetes Maternal Grandmother    • Stroke Paternal Grandmother    • Heart Disease Paternal Grandmother        CURRENT MEDICATIONS  Home Medications     Reviewed by Gurwinder Lei R.N. (Registered Nurse) on 06/14/19 at 2206  Med List Status: Partial   Medication Last Dose Status   albuterol 108 (90 Base) MCG/ACT Aero Soln inhalation aerosol  Active   amitriptyline (ELAVIL) 50 MG Tab  Active   cyclobenzaprine (FLEXERIL) 10 MG Tab  Active   DILTIAZem CD (CARDIZEM CD) 120 MG CAPSULE SR 24 HR  Active   DULoxetine (CYMBALTA) 60 MG Cap DR Particles delayed-release capsule  Active   Fluticasone-Umeclidin-Vilant (TRELEGY ELLIPTA) 100-62.5-25 MCG/INH AEROSOL POWDER, BREATH ACTIVATED  Active   furosemide (LASIX) 40 MG Tab  Active   insulin glargine (LANTUS) 100 UNIT/ML Solution  Active   LACTULOSE PO  Active   levothyroxine (SYNTHROID) 75 MCG Tab  Active   rosuvastatin (CRESTOR) 10 MG Tab  Active   traZODone (DESYREL) 150 MG Tab  Active                ALLERGIES  Allergies   Allergen Reactions   • Vitamin C Diarrhea     \"violent diarrhea\"   • Codeine Nausea     Severe stomach pain   • Gabapentin Palpitations     Gets shaky and falls    • Keflex Rash     Severe rash, but TOLERATES PENICILLINS, Rocephin    • Lyrica Nausea     Nausea, dizzy   • Sudafed Nausea and Unspecified     Chest pain, nausea   • Powders        PHYSICAL EXAM  VITAL SIGNS: BP (!) 90/36   Pulse 61   Temp 37.7 °C (99.9 °F) (Temporal)   Resp (!) 22   Ht 1.651 m (5' 5\")   Wt 109.8 kg (242 lb)   SpO2 94%   BMI 40.27 kg/m²     Constitutional: Well developed, Well nourished, Mild distress, Non-toxic appearance.   HENT: Normocephalic, Atraumatic, Bilateral external ears normal, Oropharynx moist, No oral exudates.   Eyes: PERRLA, EOMI, Conjunctiva normal, No discharge.   Neck: No tenderness, Supple, No stridor.   Lymphatic: No lymphadenopathy noted.   Cardiovascular: Normal heart rate, Normal rhythm.   Thorax & Lungs: Diminished breath " sounds, expiratory wheezes before albuterol.  Abdomen: Soft, No tenderness, No masses, No pulsatile masses.   Skin: Warm, Dry, No erythema, No rash.   Extremities:, No edema No cyanosis.   Musculoskeletal: No tenderness to palpation or major deformities noted.  Intact distal pulses  Neurologic: Awake, alert. Moves all extremities spontaneously.  Psychiatric: Affect normal, Judgment normal, Mood normal.     LABS  Results for orders placed or performed during the hospital encounter of 06/14/19   Lactic acid (lactate)   Result Value Ref Range    Lactic Acid 0.9 0.5 - 2.0 mmol/L   CBC WITH DIFFERENTIAL   Result Value Ref Range    WBC 15.5 (H) 4.8 - 10.8 K/uL    RBC 2.56 (L) 4.20 - 5.40 M/uL    Hemoglobin 7.3 (L) 12.0 - 16.0 g/dL    Hematocrit 23.4 (L) 37.0 - 47.0 %    MCV 91.4 81.4 - 97.8 fL    MCH 28.5 27.0 - 33.0 pg    MCHC 31.2 (L) 33.6 - 35.0 g/dL    RDW 59.0 (H) 35.9 - 50.0 fL    Platelet Count 238 164 - 446 K/uL    MPV 8.8 (L) 9.0 - 12.9 fL    Neutrophils-Polys 81.10 (H) 44.00 - 72.00 %    Lymphocytes 6.80 (L) 22.00 - 41.00 %    Monocytes 9.80 0.00 - 13.40 %    Eosinophils 0.60 0.00 - 6.90 %    Basophils 0.30 0.00 - 1.80 %    Immature Granulocytes 1.40 (H) 0.00 - 0.90 %    Nucleated RBC 0.30 /100 WBC    Neutrophils (Absolute) 12.53 (H) 2.00 - 7.15 K/uL    Lymphs (Absolute) 1.05 1.00 - 4.80 K/uL    Monos (Absolute) 1.51 (H) 0.00 - 0.85 K/uL    Eos (Absolute) 0.10 0.00 - 0.51 K/uL    Baso (Absolute) 0.04 0.00 - 0.12 K/uL    Immature Granulocytes (abs) 0.22 (H) 0.00 - 0.11 K/uL    NRBC (Absolute) 0.04 K/uL   COMP METABOLIC PANEL   Result Value Ref Range    Sodium 135 135 - 145 mmol/L    Potassium 4.5 3.6 - 5.5 mmol/L    Chloride 99 96 - 112 mmol/L    Co2 28 20 - 33 mmol/L    Anion Gap 8.0 0.0 - 11.9    Glucose 274 (H) 65 - 99 mg/dL    Bun 31 (H) 8 - 22 mg/dL    Creatinine 2.10 (H) 0.50 - 1.40 mg/dL    Calcium 8.7 8.5 - 10.5 mg/dL    AST(SGOT) 18 12 - 45 U/L    ALT(SGPT) 20 2 - 50 U/L    Alkaline Phosphatase 35 30 -  99 U/L    Total Bilirubin 0.6 0.1 - 1.5 mg/dL    Albumin 3.3 3.2 - 4.9 g/dL    Total Protein 5.6 (L) 6.0 - 8.2 g/dL    Globulin 2.3 1.9 - 3.5 g/dL    A-G Ratio 1.4 g/dL   COD - Adult (Type and Screen)   Result Value Ref Range    ABO Grouping Only O     Rh Grouping Only POS (A)     Antibody Screen-Cod NEG    ESTIMATED GFR   Result Value Ref Range    GFR If  28 (A) >60 mL/min/1.73 m 2    GFR If Non African American 23 (A) >60 mL/min/1.73 m 2   EKG   Result Value Ref Range    Report       [facility] Emergency Dept.    Test Date:  2019  Pt Name:    CHAITANYA CABELLO            Department: ER  MRN:        [id number]                      Room:        18  Gender:     Female                       Technician: [id number]  :        [id number]                   Requested By:ER TRIAGE PROTOCOL  Order #:    [id number]                    Reading MD:    Measurements  Intervals                                Axis  Rate:       58                           P:          0  NY:         164                          QRS:        13  QRSD:       90                           T:          123  QT:         456  QTc:        448    Interpretive Statements  SINUS BRADYCARDIA  ABNORMAL T, CONSIDER ISCHEMIA, LATERAL LEADS  Compared to ECG 2019 21:02:01  Sinus rhythm no longer present  Atrial premature complex(es) no longer present  Atrial abnormality no longer present  T-wave abnormality still present  Possible ischemia still present        RADIOLOGY  DX-CHEST-PORTABLE (1 VIEW)   Final Result         Diffuse interstitial prominence could relate to mild pulmonary edema or atypical infection.      Patchy bibasilar opacities, likely atelectasis.      Stable cardiomegaly.        The radiologist's interpretation of all radiological studies have been reviewed by me.    COURSE & MEDICAL DECISION MAKING  Pertinent Labs & Imaging studies reviewed. (See chart for details)    10:33 PM - Patient seen and examined at  bedside. Plan of care was discussed with patient which is to give a breathing treatment.   Ordered Dx-chest, lactic acid, CBC with differential, CMP, UA, urine culture, blood culture, EKG to evaluate her symptoms. The differential diagnoses include but are not limited to: COPD exacerbation, anemia, pneumonia    11:19 PM- Patient was treated with proventil 2.5 mg.     11:24 PM-  Recheck: Patient re-evaluated at [facility]. Patient's breathing has improved. She is now able to talk without difficulty.  Discussed patient's condition and treatment plan. Patient's lab and radiology results discussed. The patient understood and is in agreement.     11:53 PM- Ordered vibramycin 100 mg, rocephin 2 g    12:48 AM- Hospitalist was paged at this time.     Decision Making:  Patient presenting acutely short of breath and hypoxic even on her supplemental oxygen.  Also noted to have fever at this point as well.  Apparent exacerbation of COPD but possibly also infectious process.  She was recently in the hospital this to be hospital-acquired pneumonia and we will treat her as such.  Discussed with Dr. Castro patient will be admitted.  I have also sent a crossmatch as the patient will not delay transfusion as her anemia is likely contributing to her significant shortness of breath.    DISPOSITION:  Patient will be admitted to Hospitalist in guarded condition.      FINAL IMPRESSION  1. Acute exacerbation of chronic obstructive pulmonary disease (COPD) (HCC)    2. Anemia, unspecified type    3. Hypoxia       The note accurately reflects work and decisions made by me.  Diego Cunha  6/15/2019  1:05 AM   ISwati (Alla), am scribing for, and in the presence of, Diego Cunha M.D..    Electronically signed by: Swati Muñoz), 6/14/2019    Diego CLAY M.D. personally performed the services described in this documentation, as scribed by Swati Forde in my presence, and it is both accurate and  complete.    C

## 2019-06-15 NOTE — ASSESSMENT & PLAN NOTE
Secondary to HCAP /COPD exacerbation   Echocardiogram showed diastolic dysfunction stage II, but no signs of CHF exacerbation   Almost back to her baseline

## 2019-06-15 NOTE — ASSESSMENT & PLAN NOTE
Will monitor  Acute on chronic with acute resp failure  Continue Anoro Ellipta  resp protocol  Switch to prednisone 40 mg daily (for 5 days)

## 2019-06-15 NOTE — PROGRESS NOTES
Pt's chart is reviewed with the nurse and appropriate orders are placed.she was admitted early this morning by the admitting physician.

## 2019-06-15 NOTE — PROGRESS NOTES
2 RN skin check complete with Mirtha RN  Devices in place Silicone NC, sykes catheter.  Skin assessed under devices intact.  Confirmed pressure ulcers found on n/a.  New potential pressure ulcers noted on n/a.   Pictures taken and uploaded  The following interventions in place q turn turns, sykes to prevent moisture, interdrys, heels floated, mepilex, O2 off loaded as needed.      BL ears pink/blanching  R cheek abrasion  BL UE generalized bruising  L upper arm abrasion  BL LE generalized bruising  Sacrum red/blanching  Pannus and under breast red/yeasty  BL heels intact  R foot, third toe with abrasion

## 2019-06-15 NOTE — ASSESSMENT & PLAN NOTE
Rocephin (06/15-06/18),  Doxycycline (06/15-06/22). I started Augmentin (end date 06/22)  (allergic to levofloxacin) . Pt is difficult to have IV access. Sputum culture are positive for gram positive rods/ gram positive cocci. Recent hospitalization 06/03.   She is on chronic oxygen L at home. Close to her baseline  Continue RT protocol  Repeat CXR p06/19, no much difference    Leukocytosis is possible due to steroid use . Continue to monitor

## 2019-06-15 NOTE — H&P
Hospital Medicine History & Physical Note    Date of Service  6/15/2019    Primary Care Physician  Kavitha Mccall M.D.    Consultants  None    Code Status  Full code    Chief Complaint  Shortness of breath    History of Presenting Illness  68 y.o. female with a past medical history of morbid obesity, EDITH intolerant of CPAP, COPD, chronic respiratory failure on 4 L of oxygen, who presented 6/14/2019 with shortness of breath.  The patient has been admitted to [facility] 6 times in the past 6 months for shortness of breath with COPD exacerbation.  Her most recent hospitalization was on 6/3/2019, she was treated with antibiotics and steroids.  She was noted to have edema and was diuresed with Lasix.  She was noted to have downtrending hemoglobin with positive occult blood and was given a unit of PRBC.  The case was discussed with  who recommended outpatient endoscopy.  She was also evaluated by cardiology 2D echo revealed preserved left ventricular systolic function and grade 2 diastolic dysfunction.  Patient returns with worsening shortness of breath, productive cough and increase in oxygen demands.  She has been compliant with all her medications and inhalers.  She reports subjective fevers and chills.  She denies any chest pain or lightheadedness.  She reports bilateral lower extremity edema despite taking Lasix.    In the ER she was noted to be hypoxic and was placed on 12 L of oxygen by Oxymizer mask.    EKG interpreted by me reveals sinus bradycardia with flattened T waves in the lateral no ST elevation is noted  Chest x-ray interpreted by me reveals diffuse interstitial prominence and bibasilar opacities.  Stable cardiomegaly    Review of Systems  Review of Systems   Constitutional: Positive for chills and fever. Negative for diaphoresis.   HENT: Negative for hearing loss and sore throat.    Eyes: Negative for blurred vision.   Respiratory: Positive for cough, sputum production and shortness of breath.  Negative for wheezing.    Cardiovascular: Positive for leg swelling. Negative for chest pain and palpitations.   Gastrointestinal: Negative for abdominal pain, blood in stool, diarrhea, nausea and vomiting.   Genitourinary: Negative for dysuria, flank pain and urgency.   Musculoskeletal: Negative for back pain, joint pain, myalgias and neck pain.   Skin: Negative for rash.   Neurological: Negative for dizziness, focal weakness, seizures and headaches.   Endo/Heme/Allergies: Does not bruise/bleed easily.   Psychiatric/Behavioral: Negative for suicidal ideas.   All other systems reviewed and are negative.      Past Medical History   has a past medical history of Arthritis; ASTHMA; Backpain; Breath shortness; Bronchitis; Congestive heart failure (HCC) (2013); COPD; Diabetes; Disorder of thyroid; Fall; Fibromyalgia; GERD (gastroesophageal reflux disease); Heart burn; Hepatitis C; Hypertension; Indigestion; Infectious disease; Neuropathy ([facility]); On home oxygen therapy; Other specified symptom associated with female genital organs; Pain (9/13/2016); PND (post-nasal drip); Pneumonia; Psychiatric problem (9/13/2016); RLS (restless legs syndrome); Sleep apnea; and Unspecified urinary incontinence.    Surgical History   has a past surgical history that includes gyn surgery; other orthopedic surgery; other orthopedic surgery; us-needle core bx-breast panel; lumpectomy (Left); pr inj dx/ther agnt paravert facet joint, bruce* (Left, 7/10/2015); pr inj dx/ther agnt paravert facet joint, bruce* (7/10/2015); pr inj dx/ther agnt paravert facet joint, bruce* (7/10/2015); pr inject nerv blck,othr periph nerv (7/10/2015); pr inj dx/ther agnt paravert facet joint, bruce* (Left, 7/17/2015); pr inj dx/ther agnt paravert facet joint, bruce* (7/17/2015); pr inj dx/ther agnt paravert facet joint, bruce* (7/17/2015); pr inject nerv blck,othr periph nerv (7/17/2015); pr dstr nrolytc agnt parverteb fct sngl lmbr/sacral (Left, 10/2/2015); pr dstr nrolytc agnt  "parverteb fct addl lmbr/sacral (10/2/2015); pr inject rx other periph nerve (10/2/2015); pr dstr nrolytc agnt parverteb fct addl lmbr/sacral (10/2/2015); pr dstr nrolytc agnt parverteb fct sngl lmbr/sacral (Right, 11/6/2015); pr dstr nrolytc agnt parverteb fct addl lmbr/sacral (11/6/2015); pr inject rx other periph nerve (11/6/2015); pr dstr nrolytc agnt parverteb fct addl lmbr/sacral (11/6/2015); pr dstr nrolytc agnt parverteb fct sngl lmbr/sacral (Left, 9/16/2016); pr dstr nrolytc agnt parverteb fct addl lmbr/sacral (9/16/2016); pr dstr nrolytc agnt parverteb fct addl lmbr/sacral (9/16/2016); pr fluoroscopic guidance needle placement (9/16/2016); and ankle orif (Left, 5/8/2017).     Family History  family history includes Alcohol/Drug in her mother; Cancer in her brother and father; Diabetes in her brother and maternal grandmother; Heart Disease in her mother and paternal grandmother; Lung Disease in her mother; Stroke in her paternal grandmother.     Social History   reports that she quit smoking about 2 months ago. Her smoking use included Cigarettes. She has a 50.00 pack-year smoking history. She has never used smokeless tobacco. She reports that she does not drink alcohol or use drugs.    Allergies  Allergies   Allergen Reactions   • Vitamin C Diarrhea     \"violent diarrhea\"   • Codeine Nausea     Severe stomach pain   • Gabapentin Palpitations     Gets shaky and falls    • Keflex Rash     Severe rash, but TOLERATES PENICILLINS, Rocephin    • Lyrica Nausea     Nausea, dizzy   • Sudafed Nausea and Unspecified     Chest pain, nausea   • Powders        Medications  Prior to Admission Medications   Prescriptions Last Dose Informant Patient Reported? Taking?   DILTIAZem CD (CARDIZEM CD) 120 MG CAPSULE SR 24 HR   No No   Sig: Take 1 Cap by mouth every day.   DULoxetine (CYMBALTA) 60 MG Cap DR Particles delayed-release capsule  Patient No No   Sig: TAKE 1 CAPSULE BY MOUTH DAILY   Fluticasone-Umeclidin-Vilant (TRELEGY " ELLIPTA) 100-62.5-25 MCG/INH AEROSOL POWDER, BREATH ACTIVATED  Patient Yes No   Sig: Inhale 1 Puff by mouth every day.   LACTULOSE PO   Yes No   Sig: Take  by mouth as needed.   albuterol 108 (90 Base) MCG/ACT Aero Soln inhalation aerosol  Patient Yes No   Sig: Inhale 2 Puffs by mouth every four hours as needed for Shortness of Breath.   amitriptyline (ELAVIL) 50 MG Tab  Patient Yes No   Sig: Take 50 mg by mouth every bedtime.   cyclobenzaprine (FLEXERIL) 10 MG Tab  Patient Yes No   Sig: Take 10 mg by mouth every bedtime.   furosemide (LASIX) 40 MG Tab   No No   Sig: Take 1 Tab by mouth every day.   insulin glargine (LANTUS) 100 UNIT/ML Solution  Patient Yes No   Sig: Inject 20 Units as instructed every evening.   levothyroxine (SYNTHROID) 75 MCG Tab  Patient No No   Sig: TAKE 1 TABLET BY MOUTH DAILY   rosuvastatin (CRESTOR) 10 MG Tab  Patient Yes No   Sig: Take 10 mg by mouth every morning.   traZODone (DESYREL) 150 MG Tab  Patient Yes No   Sig: Take 150 mg by mouth every bedtime.      Facility-Administered Medications: None       Physical Exam  Temp:  [37.7 °C (99.9 °F)] 37.7 °C (99.9 °F)  Pulse:  [53-61] 57  Resp:  [20-24] 20  BP: ()/(22-40) 107/40  SpO2:  [92 %-94 %] 92 %    Physical Exam   Constitutional: She is oriented to person, place, and time. She appears well-developed and well-nourished. No distress.   Morbidly obese   HENT:   Head: Normocephalic and atraumatic.   Mouth/Throat: Oropharynx is clear and moist.   Eyes: Pupils are equal, round, and reactive to light. Conjunctivae are normal. Right eye exhibits no discharge. Left eye exhibits no discharge. No scleral icterus.   Neck: Normal range of motion. Neck supple. No tracheal deviation present.   Cardiovascular: Regular rhythm and normal heart sounds.    Bradycardic   Pulmonary/Chest: No stridor. She is in respiratory distress. She has no wheezes. She has rales.   Abdominal: Soft. Bowel sounds are normal. She exhibits no distension. There is no  tenderness. There is no rebound and no guarding.   Musculoskeletal: Normal range of motion. She exhibits edema (2+ pitting edema in bilateral lower extrema). She exhibits no tenderness or deformity.   Lymphadenopathy:     She has no cervical adenopathy.   Neurological: She is alert and oriented to person, place, and time. No cranial nerve deficit. Coordination normal.   Skin: Skin is warm and dry. No rash noted. She is not diaphoretic. No erythema.   Psychiatric: She has a normal mood and affect. Her behavior is normal.   Nursing note and vitals reviewed.      Laboratory:  Recent Labs      06/12/19   0944  06/14/19 2210   WBC  14.3*  15.5*   RBC  2.90*  2.56*   HEMOGLOBIN  8.0*  7.3*   HEMATOCRIT  27.5*  23.4*   MCV  94.8  91.4   MCH  27.6  28.5   MCHC  29.1*  31.2*   RDW  60.5*  59.0*   PLATELETCT  266  238   MPV  9.5  8.8*     Recent Labs      06/12/19   0944  06/14/19   2210   SODIUM  138  135   POTASSIUM  4.4  4.5   CHLORIDE  101  99   CO2  27  28   GLUCOSE  182*  274*   BUN  48*  31*   CREATININE  1.90*  2.10*   CALCIUM  8.5  8.7     Recent Labs      06/12/19   0944  06/14/19   2210   ALTSGPT   --   20   ASTSGOT   --   18   ALKPHOSPHAT   --   35   TBILIRUBIN   --   0.6   GLUCOSE  182*  274*                 No results for input(s): TROPONINI in the last 72 hours.    Urinalysis:    No results found     Imaging:  DX-CHEST-PORTABLE (1 VIEW)   Final Result         Diffuse interstitial prominence could relate to mild pulmonary edema or atypical infection.      Patchy bibasilar opacities, likely atelectasis.      Stable cardiomegaly.            Assessment/Plan:  I anticipate this patient will require at least two midnights for appropriate medical management, necessitating inpatient admission.    HCAP (healthcare-associated pneumonia)- (present on admission)   Assessment & Plan    Patient has been started on IV ceftriaxone and doxycycline  Check pro calcitonin, if within normal limits we'll consider de-escalating  antibiotics  RT protocol  Continue supplemental oxygen, wean as tolerated  Follow blood cultures       HAN (acute kidney injury) ([facility])- (present on admission)   Assessment & Plan    In the setting of acute chf  Gentle diuresis  Monitor BMP and assess response  Avoid IV contrast/nephrotoxins/NSAIDs  Dose adjust meds for decreased GFR         Acute on chronic respiratory failure with hypoxia ([facility])- (present on admission)   Assessment & Plan    Secondary to HCAP and acute diastolic CHF in the setting of severe COPD  Currently requiring 12 L of oxygen  RT protocol     COPD (chronic obstructive pulmonary disease) ([facility])- (present on admission)   Assessment & Plan    Severe COPD, does not appear to be in acute exacerbation with no wheezing at this time  Continue Anoro Ellipta  DuoNeb as needed       DM type 2, uncontrolled, with renal complications ([facility])- (present on admission)   Assessment & Plan    Uncontrolled with hyperglycemia  Continue Lantus 20  Start on insulin sliding scale with serial Accu-Checks  Hypoglycemic protocol in place         Anemia- (present on admission)   Assessment & Plan    Occult blood positive  Check iron studies, folate, B12 and TSH  Monitor CBC, transfuse for hemoglobin less than 7  Consider consulting GI for endoscopic evaluation       Acute diastolic congestive heart failure ([facility])- (present on admission)   Assessment & Plan    IV Lasix 40 mg daily  Fluid and salt restriction  Monitor BMP and volume status     Class 3 severe obesity due to excess calories with serious comorbidity and body mass index (BMI) of 40.0 to 44.9 in adult ([facility])- (present on admission)   Assessment & Plan    Body mass index is 40.27 kg/m².           VTE prophylaxis: SCD

## 2019-06-15 NOTE — DIETARY
NUTRITION SERVICES: BMI - Pt with BMI >40 (=Body mass index is 40.39 kg/m².), class III (extreme) obesity. Weight loss counseling not appropriate in acute care setting. RECOMMEND - Referral to outpatient nutrition services for weight management after D/C.

## 2019-06-15 NOTE — ED TRIAGE NOTES
Priya Callahan  68 y.o. female  Chief Complaint   Patient presents with   • Shortness of Breath       Pt BIB EMS for increasing SOB. Pt was just d/c from here a couple days ago for the same s/s. Pt was found to be at 71% on her 4L NC at home. Pt received 2 Albuterol treatments and 1 Duoneb which has improved her 02 levels, pt also received 975mg of tylenol due to her 101.1F temperature. Pt currently 15L NRB at 94%. Pt is A&Ox4, denies any other complaint.

## 2019-06-15 NOTE — PROGRESS NOTES
Pt brought up from ED. Report received from ED RN. Discussed POC with pt, all questions addressed. Pt transported on zoll, confirmed SR with monitor tech. Discussed safety with pt. Bed is locked in lowest position and call light/personal belongings are within reach.

## 2019-06-16 ENCOUNTER — TELEPHONE (OUTPATIENT)
Dept: MEDICAL GROUP | Facility: PHYSICIAN GROUP | Age: 69
End: 2019-06-16

## 2019-06-16 ENCOUNTER — APPOINTMENT (OUTPATIENT)
Dept: RADIOLOGY | Facility: MEDICAL CENTER | Age: 69
DRG: 193 | End: 2019-06-16
Attending: FAMILY MEDICINE
Payer: MEDICARE

## 2019-06-16 LAB
ANION GAP SERPL CALC-SCNC: 9 MMOL/L (ref 0–11.9)
BACTERIA BLD CULT: ABNORMAL
BACTERIA BLD CULT: ABNORMAL
BASOPHILS # BLD AUTO: 0.3 % (ref 0–1.8)
BASOPHILS # BLD: 0.03 K/UL (ref 0–0.12)
BUN SERPL-MCNC: 34 MG/DL (ref 8–22)
CALCIUM SERPL-MCNC: 8.5 MG/DL (ref 8.5–10.5)
CHLORIDE SERPL-SCNC: 101 MMOL/L (ref 96–112)
CO2 SERPL-SCNC: 30 MMOL/L (ref 20–33)
CREAT SERPL-MCNC: 2.4 MG/DL (ref 0.5–1.4)
EOSINOPHIL # BLD AUTO: 0.17 K/UL (ref 0–0.51)
EOSINOPHIL NFR BLD: 1.5 % (ref 0–6.9)
ERYTHROCYTE [DISTWIDTH] IN BLOOD BY AUTOMATED COUNT: 58.9 FL (ref 35.9–50)
FOLATE SERPL-MCNC: 9.5 NG/ML
GLUCOSE BLD-MCNC: 329 MG/DL (ref 65–99)
GLUCOSE SERPL-MCNC: 171 MG/DL (ref 65–99)
HCT VFR BLD AUTO: 24.9 % (ref 37–47)
HGB BLD-MCNC: 7.4 G/DL (ref 12–16)
HGB BLD-MCNC: 7.5 G/DL (ref 12–16)
HGB BLD-MCNC: 7.8 G/DL (ref 12–16)
IMM GRANULOCYTES # BLD AUTO: 0.22 K/UL (ref 0–0.11)
IMM GRANULOCYTES NFR BLD AUTO: 1.9 % (ref 0–0.9)
IRON SATN MFR SERPL: 11 % (ref 15–55)
IRON SERPL-MCNC: 24 UG/DL (ref 40–170)
LYMPHOCYTES # BLD AUTO: 1.41 K/UL (ref 1–4.8)
LYMPHOCYTES NFR BLD: 12.2 % (ref 22–41)
MCH RBC QN AUTO: 28.2 PG (ref 27–33)
MCHC RBC AUTO-ENTMCNC: 30.1 G/DL (ref 33.6–35)
MCV RBC AUTO: 93.6 FL (ref 81.4–97.8)
MONOCYTES # BLD AUTO: 1.09 K/UL (ref 0–0.85)
MONOCYTES NFR BLD AUTO: 9.5 % (ref 0–13.4)
NEUTROPHILS # BLD AUTO: 8.61 K/UL (ref 2–7.15)
NEUTROPHILS NFR BLD: 74.6 % (ref 44–72)
NRBC # BLD AUTO: 0.03 K/UL
NRBC BLD-RTO: 0.3 /100 WBC
PLATELET # BLD AUTO: 216 K/UL (ref 164–446)
PMV BLD AUTO: 9.3 FL (ref 9–12.9)
POTASSIUM SERPL-SCNC: 4.5 MMOL/L (ref 3.6–5.5)
RBC # BLD AUTO: 2.66 M/UL (ref 4.2–5.4)
SIGNIFICANT IND 70042: ABNORMAL
SITE SITE: ABNORMAL
SODIUM SERPL-SCNC: 140 MMOL/L (ref 135–145)
SOURCE SOURCE: ABNORMAL
TIBC SERPL-MCNC: 211 UG/DL (ref 250–450)
TSH SERPL DL<=0.005 MIU/L-ACNC: 1.59 UIU/ML (ref 0.38–5.33)
VIT B12 SERPL-MCNC: 1062 PG/ML (ref 211–911)
WBC # BLD AUTO: 11.5 K/UL (ref 4.8–10.8)

## 2019-06-16 PROCEDURE — 84443 ASSAY THYROID STIM HORMONE: CPT

## 2019-06-16 PROCEDURE — 99223 1ST HOSP IP/OBS HIGH 75: CPT | Performed by: INTERNAL MEDICINE

## 2019-06-16 PROCEDURE — 99406 BEHAV CHNG SMOKING 3-10 MIN: CPT

## 2019-06-16 PROCEDURE — 36415 COLL VENOUS BLD VENIPUNCTURE: CPT

## 2019-06-16 PROCEDURE — 85025 COMPLETE CBC W/AUTO DIFF WBC: CPT

## 2019-06-16 PROCEDURE — A9270 NON-COVERED ITEM OR SERVICE: HCPCS | Performed by: FAMILY MEDICINE

## 2019-06-16 PROCEDURE — 83550 IRON BINDING TEST: CPT

## 2019-06-16 PROCEDURE — 700102 HCHG RX REV CODE 250 W/ 637 OVERRIDE(OP): Performed by: INTERNAL MEDICINE

## 2019-06-16 PROCEDURE — 76775 US EXAM ABDO BACK WALL LIM: CPT

## 2019-06-16 PROCEDURE — 80048 BASIC METABOLIC PNL TOTAL CA: CPT

## 2019-06-16 PROCEDURE — 770020 HCHG ROOM/CARE - TELE (206)

## 2019-06-16 PROCEDURE — 83540 ASSAY OF IRON: CPT

## 2019-06-16 PROCEDURE — 700111 HCHG RX REV CODE 636 W/ 250 OVERRIDE (IP): Mod: JG | Performed by: FAMILY MEDICINE

## 2019-06-16 PROCEDURE — 82962 GLUCOSE BLOOD TEST: CPT

## 2019-06-16 PROCEDURE — 82607 VITAMIN B-12: CPT

## 2019-06-16 PROCEDURE — 99233 SBSQ HOSP IP/OBS HIGH 50: CPT | Performed by: FAMILY MEDICINE

## 2019-06-16 PROCEDURE — 700105 HCHG RX REV CODE 258: Performed by: FAMILY MEDICINE

## 2019-06-16 PROCEDURE — 700102 HCHG RX REV CODE 250 W/ 637 OVERRIDE(OP): Performed by: FAMILY MEDICINE

## 2019-06-16 PROCEDURE — 85018 HEMOGLOBIN: CPT

## 2019-06-16 PROCEDURE — A9270 NON-COVERED ITEM OR SERVICE: HCPCS | Performed by: INTERNAL MEDICINE

## 2019-06-16 PROCEDURE — 87040 BLOOD CULTURE FOR BACTERIA: CPT

## 2019-06-16 PROCEDURE — 82746 ASSAY OF FOLIC ACID SERUM: CPT

## 2019-06-16 RX ORDER — ACETAMINOPHEN 325 MG/1
650 TABLET ORAL ONCE
Status: COMPLETED | OUTPATIENT
Start: 2019-06-16 | End: 2019-06-16

## 2019-06-16 RX ORDER — METHYLPREDNISOLONE SODIUM SUCCINATE 40 MG/ML
40 INJECTION, POWDER, LYOPHILIZED, FOR SOLUTION INTRAMUSCULAR; INTRAVENOUS ONCE
Status: DISCONTINUED | OUTPATIENT
Start: 2019-06-16 | End: 2019-06-16

## 2019-06-16 RX ORDER — METHYLPREDNISOLONE SODIUM SUCCINATE 40 MG/ML
40 INJECTION, POWDER, LYOPHILIZED, FOR SOLUTION INTRAMUSCULAR; INTRAVENOUS EVERY 8 HOURS
Status: DISCONTINUED | OUTPATIENT
Start: 2019-06-16 | End: 2019-06-18

## 2019-06-16 RX ORDER — DIPHENHYDRAMINE HYDROCHLORIDE 50 MG/ML
25 INJECTION INTRAMUSCULAR; INTRAVENOUS ONCE
Status: COMPLETED | OUTPATIENT
Start: 2019-06-16 | End: 2019-06-16

## 2019-06-16 RX ORDER — DIPHENHYDRAMINE HCL 25 MG
25 TABLET ORAL ONCE
Status: COMPLETED | OUTPATIENT
Start: 2019-06-16 | End: 2019-06-16

## 2019-06-16 RX ADMIN — SODIUM CHLORIDE 1600 MG: 9 INJECTION, SOLUTION INTRAVENOUS at 18:16

## 2019-06-16 RX ADMIN — UMECLIDINIUM BROMIDE AND VILANTEROL TRIFENATATE 1 PUFF: 62.5; 25 POWDER RESPIRATORY (INHALATION) at 14:52

## 2019-06-16 RX ADMIN — DOXYCYCLINE 100 MG: 100 TABLET, FILM COATED ORAL at 05:28

## 2019-06-16 RX ADMIN — AMITRIPTYLINE HYDROCHLORIDE 50 MG: 25 TABLET, FILM COATED ORAL at 21:54

## 2019-06-16 RX ADMIN — DOXYCYCLINE 100 MG: 100 TABLET, FILM COATED ORAL at 18:15

## 2019-06-16 RX ADMIN — TRAZODONE HYDROCHLORIDE 150 MG: 50 TABLET ORAL at 21:53

## 2019-06-16 RX ADMIN — SODIUM CHLORIDE 25 MG: 9 INJECTION, SOLUTION INTRAVENOUS at 14:58

## 2019-06-16 RX ADMIN — INSULIN GLARGINE 20 UNITS: 100 INJECTION, SOLUTION SUBCUTANEOUS at 21:54

## 2019-06-16 RX ADMIN — POLYETHYLENE GLYCOL 3350 1 PACKET: 17 POWDER, FOR SOLUTION ORAL at 12:27

## 2019-06-16 RX ADMIN — NYSTATIN: 100000 POWDER TOPICAL at 18:17

## 2019-06-16 RX ADMIN — NYSTATIN: 100000 POWDER TOPICAL at 05:28

## 2019-06-16 RX ADMIN — SENNOSIDES,DOCUSATE SODIUM 2 TABLET: 8.6; 5 TABLET, FILM COATED ORAL at 18:15

## 2019-06-16 RX ADMIN — ROSUVASTATIN CALCIUM 10 MG: 10 TABLET, FILM COATED ORAL at 05:27

## 2019-06-16 RX ADMIN — ACETAMINOPHEN 650 MG: 325 TABLET, FILM COATED ORAL at 14:40

## 2019-06-16 RX ADMIN — DIPHENHYDRAMINE HCL 25 MG: 25 TABLET ORAL at 14:41

## 2019-06-16 RX ADMIN — METHYLPREDNISOLONE SODIUM SUCCINATE 40 MG: 40 INJECTION, POWDER, FOR SOLUTION INTRAMUSCULAR; INTRAVENOUS at 14:41

## 2019-06-16 RX ADMIN — DULOXETINE HYDROCHLORIDE 60 MG: 60 CAPSULE, DELAYED RELEASE ORAL at 06:00

## 2019-06-16 ASSESSMENT — ENCOUNTER SYMPTOMS
FEVER: 0
DOUBLE VISION: 0
PHOTOPHOBIA: 0
TREMORS: 0
CLAUDICATION: 0
SHORTNESS OF BREATH: 1
NAUSEA: 0
BACK PAIN: 0
NECK PAIN: 0
TINGLING: 0
CHILLS: 0
VOMITING: 0
DIAPHORESIS: 0
BLURRED VISION: 0
HEADACHES: 0
COUGH: 1
HEARTBURN: 0

## 2019-06-16 NOTE — CONSULTS
DATE OF SERVICE:  06/16/2019    REQUESTING PHYSICIAN:  Terence Macias MD    REASON FOR CONSULTATION:  Acute kidney injury on chronic kidney disease stage   III/IV.    Patient seen and examined, medical record reviewed.    HISTORY OF PRESENT ILLNESS:  The patient is a 68-year-old lady with a past   medical history significant for COPD, obstructive sleep apnea, chronic kidney   disease stage III-IV with recent creatinine in 04/2019 was 1.7.  Patient   presented to the hospital yesterday with shortness of breath, patient was   diagnosed with possible pneumonia and also developed acute kidney injury on   chronic kidney disease stage III with a creatinine up to 2.4 today.  We were   called to manage her kidney disease, assessing the need for dialysis.    Patient continued to have shortness of breath and productive cough, but she   feels a little bit better after starting the antibiotic.  Patient does have   fever and chills and mild lower extremity edema.    Patient had no recent use of NSAIDs or IV contrast exposure.    PAST MEDICAL HISTORY:  Significant for:  1.  Chronic kidney disease stage III.  2.  Congestive heart failure.  3.  Diabetes.  4.  Obesity.  5.  COPD.    ALLERGIES:  The list was reviewed.    SOCIAL HISTORY:  Patient had a 50-pack per year smoking history.  She quit   about 2 months ago.    FAMILY HISTORY:  Positive for diabetes.    MEDICATIONS:  Reviewed.    REVIEW OF SYSTEMS:  Again, patient has some shortness of breath, some fever.    She is fatigued.  She has decreased p.o. intake.  All other review of systems   are negative except as in history of present illness.    PHYSICAL EXAMINATION:  GENERAL:  The patient has mild respiratory distress.  VITAL SIGNS:  Showed blood pressure of 146/72, heart rate was 71, respiratory   rate was 20.  HEENT:  Normocephalic, atraumatic.  Sclerae are anicteric.  Pupils are   reactive.  Nose is normal.  Mucous membranes moist.  NECK:  No lymphadenopathy.  No JVD.   No thyroid mass.  CHEST:  Normal.  LUNGS:  Coarse breath sounds with scattered rhonchi.  HEART:  S1, S2.  ABDOMEN:  Obese, but soft, nontender, no hepatosplenomegaly.  There is no   inguinal lymphadenopathy.  EXTREMITIES:  There is trace lower extremity edema.  SKIN:  No skin rashes.  NEUROLOGIC:  Patient is alert and oriented x3.  MOOD:  Patient is anxious.    LABORATORY DATA:  Her recent labs from today were reviewed.    DIAGNOSTIC DATA:  I also reviewed chest x-ray done on 06/14/2019, reviewed the   image myself, which showed airspace disease and cardiomegaly.    ASSESSMENT AND PLAN:  1.  Acute kidney injury on chronic kidney disease stage III.  The etiology is   most likely prerenal secondary to decreased p.o. intake from pneumonia versus   progression of her diabetic nephropathy.  2.  Respiratory failure.  3.  Hypertension.  4.  Pneumonia.  5.  Chronic obstructive pulmonary disease exacerbation.  6.  Anemia.    PLAN:  1.  There is no acute need for renal replacement therapy.  2.  Check urine sodium, creatinine, as well as protein.  3.  Renal dose all medications.  4.  Avoid nephrotoxins.  5.  Daily labs.  6.  If there is no improvement in the kidney function in the next 24-48 hours,   consider dialysis.  7.  Prognosis is guarded.    Plan discussed in detail with Dr. Macias who I would like to thank for   consulting this very interesting case.       ____________________________________     FADI NAJJAR, MD FN / DMITRY    DD:  06/16/2019 14:09:02  DT:  06/16/2019 16:35:40    D#:  [id number]  Job#:  [id number]

## 2019-06-16 NOTE — RESPIRATORY CARE
COPD EDUCATION by COPD CLINICAL EDUCATOR  6/16/2019  at  11:23 AM by Cristela Martinez     Patient interviewed by COPD education team.  Patient declined to participate in full program.  Short intervention has been conducted.  A comprehensive packet including information about COPD, treatments, and smoking cessation given.

## 2019-06-16 NOTE — PROGRESS NOTES
Bedside report received. Patient A&O x4. Complains of pain 3/10, pt. Declines medication . POC discussed with patient. Patient verbalized understanding. Call light and belongings within reach. Bed locked and in lowest position, alarm and fall precautions in place.

## 2019-06-16 NOTE — CARE PLAN
Problem: Bowel/Gastric:  Goal: Normal bowel function is maintained or improved  Outcome: PROGRESSING AS EXPECTED  Educated pt. On bowel protocol and sennosides. Additionally educated on hydration and mobility to help with bowel movement.   Intervention: Educate patient and significant other/support system about diet, fluid intake, medications and activity to promote bowel function  Educated on diet and adequate hydration and medication.       Problem: Urinary Elimination:  Goal: Ability to reestablish a normal urinary elimination pattern will improve  Outcome: PROGRESSING AS EXPECTED  Pt. Urinates frequently, calls for assistance, urine is clear and yellow

## 2019-06-16 NOTE — ASSESSMENT & PLAN NOTE
Occult blood positive  Iron deficient  No sign of bleed  b12 and folic normal  Transfuse iv iron given   Started on iron PO supplement   Follow up with GI

## 2019-06-16 NOTE — PROGRESS NOTES
Assumed care of pt, she is alert and oriented/denies pain. Discussed POC with pt and dayshift RN at bedside. Discussed safety and the need to call for assistance before getting up. Bed is locked in lowest position and call light/personal belongings are within reach.

## 2019-06-16 NOTE — PROGRESS NOTES
2 RN skin check complete with SHITAL Molina  Devices in place Silicone NC,yessenia hose. PIV, tele leads  Skin assessed under devices: intact and blanching.  Confirmed pressure ulcers found on n/a.  New potential pressure ulcers noted on n/a.     The following interventions in place q turn turn reminders, heels floated, O2 off loaded        BL ears pink/blanching  BL UE generalized bruising  BL LE generalized bruising  Sacrum red/blanching  BL heels, mildly boggy, blanching  Noted BLE swelling, bruising, discoloration

## 2019-06-16 NOTE — PROGRESS NOTES
[facility] Medicine Daily Progress Note    Date of Service  6/16/2019    Chief Complaint  68 y.o. female admitted 6/14/2019 with pneumonia    Hospital Course     past medical history of morbid obesity, EDITH intolerant of CPAP, COPD, chronic respiratory failure on 4 L of oxygen, who presented 6/14/2019 with shortness of breath.  The patient has been [facility] in the past 6 months for shortness of breath with COPD exacerbation.  Her most recent hospitalization was on 6/3/2019, she was treated with antibiotics and steroids.  She was noted to have edema and was diuresed with Lasix.  She was noted to have downtrending hemoglobin with positive occult blood and was given a unit of PRBC.  The case was discussed with  who recommended outpatient endoscopy.  She was also evaluated by cardiology 2D echo revealed preserved left ventricular systolic function and grade 2 diastolic dysfunction.  Patient returns with worsening shortness of breath, productive cough and increase in oxygen demands.  She has been compliant with all her medications and inhalers.  She reports subjective fevers and chills.  She denies any chest pain or lightheadedness    Interval Problem Update  Worsening of renal function    Consultants/Specialty  nephrology    Code Status  full    Disposition  pending    Review of Systems  Review of Systems   Constitutional: Negative for chills, diaphoresis and fever.   HENT: Negative for ear discharge, ear pain and tinnitus.    Eyes: Negative for blurred vision, double vision and photophobia.   Respiratory: Positive for cough and shortness of breath.    Cardiovascular: Positive for leg swelling. Negative for chest pain and claudication.   Gastrointestinal: Negative for heartburn, nausea and vomiting.   Genitourinary: Negative for dysuria and urgency.   Musculoskeletal: Negative for back pain and neck pain.   Skin: Positive for rash.   Neurological: Negative for tingling, tremors and headaches.         Physical Exam  Temp:  [36.2 °C (97.1 °F)-37.6 °C (99.6 °F)] 37.6 °C (99.6 °F)  Pulse:  [62-74] 71  Resp:  [16-22] 20  BP: (107-146)/(42-70) 146/52  SpO2:  [90 %-97 %] 93 %    Physical Exam   Constitutional: She is oriented to person, place, and time. No distress.   HENT:   Head: Normocephalic and atraumatic.   Eyes: Pupils are equal, round, and reactive to light. Conjunctivae are normal.   Neck: Normal range of motion. Neck supple.   Cardiovascular: Normal rate and regular rhythm.    Pulmonary/Chest: She has wheezes. She has rales.   Abdominal: Soft. Bowel sounds are normal.   Neurological: She is alert and oriented to person, place, and time.   Skin: Rash (chronic  erythema on the left leg) noted. She is not diaphoretic.       Fluids    Intake/Output Summary (Last 24 hours) at 06/16/19 1246  Last data filed at 06/15/19 2035   Gross per 24 hour   Intake              495 ml   Output                0 ml   Net              495 ml       Laboratory  Recent Labs      06/14/19   2210   06/15/19   1714  06/16/19   0100  06/16/19   0905   WBC  15.5*   --    --   11.5*   --    RBC  2.56*   --    --   2.66*   --    HEMOGLOBIN  7.3*   < >  8.1*  7.5*  7.8*   HEMATOCRIT  23.4*   --    --   24.9*   --    MCV  91.4   --    --   93.6   --    MCH  28.5   --    --   28.2   --    MCHC  31.2*   --    --   30.1*   --    RDW  59.0*   --    --   58.9*   --    PLATELETCT  238   --    --   216   --    MPV  8.8*   --    --   9.3   --     < > = values in this interval not displayed.     Recent Labs      06/14/19 2210 06/16/19   0100   SODIUM  135  140   POTASSIUM  4.5  4.5   CHLORIDE  99  101   CO2  28  30   GLUCOSE  274*  171*   BUN  31*  34*   CREATININE  2.10*  2.40*   CALCIUM  8.7  8.5     Recent Labs      06/15/19   0245   APTT  32.0   INR  1.09     Recent Labs      06/14/19   2325   BNPBTYPENAT  238*           Imaging      Assessment/Plan  HCAP (healthcare-associated pneumonia)- (present on admission)   Assessment & Plan    On  rocephin  On doxycycline  On o2       HAN (acute kidney injury) (HCC)- (present on admission)   Assessment & Plan    Consulted nephrology  Will get renal ultrasound  Cardiac echo in June/2019 showed:  Normal left ventricular systolic function.  Left ventricular ejection fraction is visually estimated to be 60%.  Grade II diastolic dysfunction.  The right ventricle was normal in size and function.  Moderate mitral stenosis.  Mean transvalvular gradient is 9 mmHg at a heart rate of  BPM.  Mild aortic stenosis.  Compared to the images of the prior study done 3/16/2017 -  there is   now evidence for mild aortic stenosis. The estimate of right   ventricular systolic pressure cannot be made on the current exam,   previously 60 mmHg.         Acute on chronic respiratory failure with hypoxia (HCC)- (present on admission)   Assessment & Plan    Secondary to HCAP   Does not appear to be in heart failure  Appears to be in acute copd exacerbation     COPD (chronic obstructive pulmonary disease) (HCC)- (present on admission)   Assessment & Plan    Severe COPD,   Acute on chronic with acute resp failure  Continue Anoro Ellipta  resp protocol  solumderol       DM type 2, uncontrolled, with renal complications (HCC)- (present on admission)   Assessment & Plan    lantus  S.s.insulin  hema1c  accu checks are noted         Anemia- (present on admission)   Assessment & Plan    Occult blood positive  Iron deficient  No sign of bleed  b12 and folic normal  Transfuse iv iron       Acute diastolic congestive heart failure (HCC)- (present on admission)   Assessment & Plan    Not in acute failure  Will monitor     Class 3 severe obesity due to excess calories with serious comorbidity and body mass index (BMI) of 40.0 to 44.9 in adult (HCC)- (present on admission)   Assessment & Plan    Body mass index is 40.27 kg/m².            VTE prophylaxis: scd

## 2019-06-16 NOTE — PROGRESS NOTES
IV Iron Per Pharmacy Note    Patient Lean Body Weight:  57 kg  Reason for Iron Replacement: Iron Deficiency Anemia      Lab Results   Component Value Date/Time    WBC 11.5 (H) 06/16/2019 01:00 AM    RBC 2.66 (L) 06/16/2019 01:00 AM    HEMOGLOBIN 7.8 (L) 06/16/2019 09:05 AM    HEMATOCRIT 24.9 (L) 06/16/2019 01:00 AM    MCV 93.6 06/16/2019 01:00 AM    MCH 28.2 06/16/2019 01:00 AM    MCHC 30.1 (L) 06/16/2019 01:00 AM    MPV 9.3 06/16/2019 01:00 AM       Recent Labs      06/16/19   0100   FOLATE  9.5   IRON  24*         Recent Labs      06/16/19   0100   CREATININE  2.40*          Assessment/Plan:  1. IV Iron Indicated.   2. Give Iron Dextran 25 mg IV test dose following diphenhydramine/acetaminophen premeds over 30 minutes per protocol.  3. If no reaction (Anaphylaxis, Hypotension/Hypertension, N/V/D, Chest pain/Back Pain, Urticaria/Pruritis) in the next hour, proceed to full dose. Nursing to call the pharmacy IV room at ext. [phone] for full dose.  4. Full dose: Iron Dextran 1600 mg IV over 4 hours. Continue to monitor for delayed ADR including: Arthralgia/myalgia, Headache/backache, chills/dizziness/malaise, moderate to high fever and n/v.      Mo WestfallD BCPS

## 2019-06-17 LAB
ALBUMIN SERPL BCP-MCNC: 3.2 G/DL (ref 3.2–4.9)
ALBUMIN/GLOB SERPL: 1.2 G/DL
ALP SERPL-CCNC: 52 U/L (ref 30–99)
ALT SERPL-CCNC: 37 U/L (ref 2–50)
ANION GAP SERPL CALC-SCNC: 9 MMOL/L (ref 0–11.9)
AST SERPL-CCNC: 38 U/L (ref 12–45)
BACTERIA SPEC RESP CULT: NORMAL
BACTERIA UR CULT: NORMAL
BASOPHILS # BLD AUTO: 0.3 % (ref 0–1.8)
BASOPHILS # BLD: 0.03 K/UL (ref 0–0.12)
BILIRUB SERPL-MCNC: 0.3 MG/DL (ref 0.1–1.5)
BUN SERPL-MCNC: 35 MG/DL (ref 8–22)
CALCIUM SERPL-MCNC: 8.9 MG/DL (ref 8.5–10.5)
CHLORIDE SERPL-SCNC: 99 MMOL/L (ref 96–112)
CO2 SERPL-SCNC: 26 MMOL/L (ref 20–33)
CREAT SERPL-MCNC: 2.01 MG/DL (ref 0.5–1.4)
EOSINOPHIL # BLD AUTO: 0 K/UL (ref 0–0.51)
EOSINOPHIL NFR BLD: 0 % (ref 0–6.9)
ERYTHROCYTE [DISTWIDTH] IN BLOOD BY AUTOMATED COUNT: 54.4 FL (ref 35.9–50)
EST. AVERAGE GLUCOSE BLD GHB EST-MCNC: 146 MG/DL
GLOBULIN SER CALC-MCNC: 2.7 G/DL (ref 1.9–3.5)
GLUCOSE BLD-MCNC: 267 MG/DL (ref 65–99)
GLUCOSE SERPL-MCNC: 393 MG/DL (ref 65–99)
GRAM STN SPEC: NORMAL
HBA1C MFR BLD: 6.7 % (ref 0–5.6)
HCT VFR BLD AUTO: 26.3 % (ref 37–47)
HGB BLD-MCNC: 8 G/DL (ref 12–16)
IMM GRANULOCYTES # BLD AUTO: 0.18 K/UL (ref 0–0.11)
IMM GRANULOCYTES NFR BLD AUTO: 1.5 % (ref 0–0.9)
LYMPHOCYTES # BLD AUTO: 0.39 K/UL (ref 1–4.8)
LYMPHOCYTES NFR BLD: 3.3 % (ref 22–41)
MCH RBC QN AUTO: 28 PG (ref 27–33)
MCHC RBC AUTO-ENTMCNC: 30.4 G/DL (ref 33.6–35)
MCV RBC AUTO: 92 FL (ref 81.4–97.8)
MONOCYTES # BLD AUTO: 0.14 K/UL (ref 0–0.85)
MONOCYTES NFR BLD AUTO: 1.2 % (ref 0–13.4)
NEUTROPHILS # BLD AUTO: 10.91 K/UL (ref 2–7.15)
NEUTROPHILS NFR BLD: 93.7 % (ref 44–72)
NRBC # BLD AUTO: 0.02 K/UL
NRBC BLD-RTO: 0.2 /100 WBC
PLATELET # BLD AUTO: 223 K/UL (ref 164–446)
PMV BLD AUTO: 9.5 FL (ref 9–12.9)
POTASSIUM SERPL-SCNC: 4.9 MMOL/L (ref 3.6–5.5)
PROT SERPL-MCNC: 5.9 G/DL (ref 6–8.2)
RBC # BLD AUTO: 2.86 M/UL (ref 4.2–5.4)
SIGNIFICANT IND 70042: NORMAL
SIGNIFICANT IND 70042: NORMAL
SITE SITE: NORMAL
SITE SITE: NORMAL
SODIUM SERPL-SCNC: 134 MMOL/L (ref 135–145)
SOURCE SOURCE: NORMAL
SOURCE SOURCE: NORMAL
WBC # BLD AUTO: 11.7 K/UL (ref 4.8–10.8)

## 2019-06-17 PROCEDURE — 700111 HCHG RX REV CODE 636 W/ 250 OVERRIDE (IP): Performed by: INTERNAL MEDICINE

## 2019-06-17 PROCEDURE — 80053 COMPREHEN METABOLIC PANEL: CPT

## 2019-06-17 PROCEDURE — 700102 HCHG RX REV CODE 250 W/ 637 OVERRIDE(OP): Performed by: INTERNAL MEDICINE

## 2019-06-17 PROCEDURE — 700105 HCHG RX REV CODE 258: Performed by: INTERNAL MEDICINE

## 2019-06-17 PROCEDURE — 700102 HCHG RX REV CODE 250 W/ 637 OVERRIDE(OP): Performed by: FAMILY MEDICINE

## 2019-06-17 PROCEDURE — A9270 NON-COVERED ITEM OR SERVICE: HCPCS | Performed by: INTERNAL MEDICINE

## 2019-06-17 PROCEDURE — 85025 COMPLETE CBC W/AUTO DIFF WBC: CPT

## 2019-06-17 PROCEDURE — A9270 NON-COVERED ITEM OR SERVICE: HCPCS | Performed by: FAMILY MEDICINE

## 2019-06-17 PROCEDURE — 36415 COLL VENOUS BLD VENIPUNCTURE: CPT

## 2019-06-17 PROCEDURE — 99232 SBSQ HOSP IP/OBS MODERATE 35: CPT | Performed by: FAMILY MEDICINE

## 2019-06-17 PROCEDURE — 99232 SBSQ HOSP IP/OBS MODERATE 35: CPT | Performed by: INTERNAL MEDICINE

## 2019-06-17 PROCEDURE — A6250 SKIN SEAL PROTECT MOISTURIZR: HCPCS | Performed by: FAMILY MEDICINE

## 2019-06-17 PROCEDURE — 83036 HEMOGLOBIN GLYCOSYLATED A1C: CPT

## 2019-06-17 PROCEDURE — 700111 HCHG RX REV CODE 636 W/ 250 OVERRIDE (IP): Performed by: FAMILY MEDICINE

## 2019-06-17 PROCEDURE — 770020 HCHG ROOM/CARE - TELE (206)

## 2019-06-17 PROCEDURE — 97535 SELF CARE MNGMENT TRAINING: CPT

## 2019-06-17 PROCEDURE — 82962 GLUCOSE BLOOD TEST: CPT

## 2019-06-17 PROCEDURE — 97162 PT EVAL MOD COMPLEX 30 MIN: CPT

## 2019-06-17 RX ORDER — OXYCODONE HYDROCHLORIDE 5 MG/1
5 TABLET ORAL EVERY 4 HOURS PRN
Status: DISCONTINUED | OUTPATIENT
Start: 2019-06-17 | End: 2019-06-21 | Stop reason: HOSPADM

## 2019-06-17 RX ORDER — HYDRALAZINE HYDROCHLORIDE 20 MG/ML
10 INJECTION INTRAMUSCULAR; INTRAVENOUS EVERY 6 HOURS PRN
Status: DISCONTINUED | OUTPATIENT
Start: 2019-06-17 | End: 2019-06-21 | Stop reason: HOSPADM

## 2019-06-17 RX ORDER — INSULIN GLARGINE 100 [IU]/ML
25 INJECTION, SOLUTION SUBCUTANEOUS NIGHTLY
Status: DISCONTINUED | OUTPATIENT
Start: 2019-06-17 | End: 2019-06-18

## 2019-06-17 RX ORDER — DILTIAZEM HYDROCHLORIDE 120 MG/1
120 CAPSULE, COATED, EXTENDED RELEASE ORAL DAILY
Status: DISCONTINUED | OUTPATIENT
Start: 2019-06-17 | End: 2019-06-21 | Stop reason: HOSPADM

## 2019-06-17 RX ORDER — CYCLOBENZAPRINE HCL 10 MG
5 TABLET ORAL 3 TIMES DAILY PRN
Status: DISCONTINUED | OUTPATIENT
Start: 2019-06-17 | End: 2019-06-21 | Stop reason: HOSPADM

## 2019-06-17 RX ORDER — FLUTICASONE PROPIONATE 44 UG/1
2 AEROSOL, METERED RESPIRATORY (INHALATION)
Status: DISCONTINUED | OUTPATIENT
Start: 2019-06-18 | End: 2019-06-21 | Stop reason: HOSPADM

## 2019-06-17 RX ADMIN — DOXYCYCLINE 100 MG: 100 TABLET, FILM COATED ORAL at 05:14

## 2019-06-17 RX ADMIN — POLYETHYLENE GLYCOL 3350 1 PACKET: 17 POWDER, FOR SOLUTION ORAL at 18:33

## 2019-06-17 RX ADMIN — OXYCODONE HYDROCHLORIDE 5 MG: 5 TABLET ORAL at 20:45

## 2019-06-17 RX ADMIN — METHYLPREDNISOLONE SODIUM SUCCINATE 40 MG: 40 INJECTION, POWDER, FOR SOLUTION INTRAMUSCULAR; INTRAVENOUS at 00:28

## 2019-06-17 RX ADMIN — ROSUVASTATIN CALCIUM 10 MG: 10 TABLET, FILM COATED ORAL at 05:15

## 2019-06-17 RX ADMIN — UMECLIDINIUM BROMIDE AND VILANTEROL TRIFENATATE 1 PUFF: 62.5; 25 POWDER RESPIRATORY (INHALATION) at 10:41

## 2019-06-17 RX ADMIN — LEVOTHYROXINE SODIUM 75 MCG: 75 TABLET ORAL at 05:15

## 2019-06-17 RX ADMIN — METHYLPREDNISOLONE SODIUM SUCCINATE 40 MG: 40 INJECTION, POWDER, FOR SOLUTION INTRAMUSCULAR; INTRAVENOUS at 16:12

## 2019-06-17 RX ADMIN — DILTIAZEM HYDROCHLORIDE 120 MG: 120 CAPSULE, COATED, EXTENDED RELEASE ORAL at 12:25

## 2019-06-17 RX ADMIN — TRAZODONE HYDROCHLORIDE 150 MG: 50 TABLET ORAL at 20:44

## 2019-06-17 RX ADMIN — METHYLPREDNISOLONE SODIUM SUCCINATE 40 MG: 40 INJECTION, POWDER, FOR SOLUTION INTRAMUSCULAR; INTRAVENOUS at 20:44

## 2019-06-17 RX ADMIN — DOXYCYCLINE 100 MG: 100 TABLET, FILM COATED ORAL at 18:32

## 2019-06-17 RX ADMIN — NYSTATIN: 100000 POWDER TOPICAL at 20:44

## 2019-06-17 RX ADMIN — CEFTRIAXONE SODIUM 2 G: 2 INJECTION, POWDER, FOR SOLUTION INTRAMUSCULAR; INTRAVENOUS at 00:29

## 2019-06-17 RX ADMIN — CYCLOBENZAPRINE HYDROCHLORIDE 5 MG: 10 TABLET, FILM COATED ORAL at 20:46

## 2019-06-17 RX ADMIN — SENNOSIDES,DOCUSATE SODIUM 2 TABLET: 8.6; 5 TABLET, FILM COATED ORAL at 05:15

## 2019-06-17 RX ADMIN — NYSTATIN: 100000 POWDER TOPICAL at 05:15

## 2019-06-17 RX ADMIN — OXYCODONE HYDROCHLORIDE 5 MG: 5 TABLET ORAL at 12:25

## 2019-06-17 RX ADMIN — INSULIN GLARGINE 25 UNITS: 100 INJECTION, SOLUTION SUBCUTANEOUS at 22:04

## 2019-06-17 RX ADMIN — DULOXETINE HYDROCHLORIDE 60 MG: 60 CAPSULE, DELAYED RELEASE ORAL at 05:16

## 2019-06-17 RX ADMIN — OMEPRAZOLE 20 MG: 20 CAPSULE, DELAYED RELEASE ORAL at 18:31

## 2019-06-17 RX ADMIN — AMITRIPTYLINE HYDROCHLORIDE 50 MG: 25 TABLET, FILM COATED ORAL at 20:45

## 2019-06-17 RX ADMIN — SENNOSIDES,DOCUSATE SODIUM 2 TABLET: 8.6; 5 TABLET, FILM COATED ORAL at 19:28

## 2019-06-17 RX ADMIN — METHYLPREDNISOLONE SODIUM SUCCINATE 40 MG: 40 INJECTION, POWDER, FOR SOLUTION INTRAMUSCULAR; INTRAVENOUS at 05:15

## 2019-06-17 ASSESSMENT — COGNITIVE AND FUNCTIONAL STATUS - GENERAL
CLIMB 3 TO 5 STEPS WITH RAILING: TOTAL
SUGGESTED CMS G CODE MODIFIER MOBILITY: CK
MOVING TO AND FROM BED TO CHAIR: A LITTLE
MOBILITY SCORE: 16
STANDING UP FROM CHAIR USING ARMS: A LITTLE
MOVING FROM LYING ON BACK TO SITTING ON SIDE OF FLAT BED: A LITTLE
WALKING IN HOSPITAL ROOM: A LITTLE
TURNING FROM BACK TO SIDE WHILE IN FLAT BAD: A LITTLE

## 2019-06-17 ASSESSMENT — ENCOUNTER SYMPTOMS
DOUBLE VISION: 0
FOCAL WEAKNESS: 0
VOMITING: 0
CHILLS: 0
SINUS PAIN: 0
EYE PAIN: 0
NAUSEA: 0
FEVER: 0
ABDOMINAL PAIN: 0
DIZZINESS: 0
WHEEZING: 0
FLANK PAIN: 0
DIARRHEA: 0
HEADACHES: 0
SHORTNESS OF BREATH: 0
SENSORY CHANGE: 1
CLAUDICATION: 0
NECK PAIN: 0
WEIGHT LOSS: 0
EYES NEGATIVE: 1
ORTHOPNEA: 0
BACK PAIN: 1
TINGLING: 0
HEMOPTYSIS: 0
DIAPHORESIS: 0
SHORTNESS OF BREATH: 1
PHOTOPHOBIA: 0
PALPITATIONS: 0
COUGH: 0
COUGH: 1

## 2019-06-17 ASSESSMENT — GAIT ASSESSMENTS
DISTANCE (FEET): 5
GAIT LEVEL OF ASSIST: SUPERVISED
DEVIATION: ANTALGIC
ASSISTIVE DEVICE: FRONT WHEEL WALKER

## 2019-06-17 NOTE — PROGRESS NOTES
Received report from night shift RN Caron. Assumed care of patient. Patient is sitting up in bed with no family present at bedside at this time. Patient declines any needs at this time. Plan of care discussed with patient. Bed locked and in the lowest position with call light within reach.

## 2019-06-17 NOTE — PROGRESS NOTES
[facility] Medicine Daily Progress Note    Date of Service  6/17/2019    Chief Complaint  68 y.o. female admitted 6/14/2019 with pneumonia    Hospital Course     past medical history of morbid obesity, EDITH intolerant of CPAP, COPD, chronic respiratory failure on 4 L of oxygen, who presented 6/14/2019 with shortness of breath.  The patient has been [facility] in the past 6 months for shortness of breath with COPD exacerbation.  Her most recent hospitalization was on 6/3/2019, she was treated with antibiotics and steroids.  She was noted to have edema and was diuresed with Lasix.  She was noted to have downtrending hemoglobin with positive occult blood and was given a unit of PRBC.  The case was discussed with  who recommended outpatient endoscopy.  She was also evaluated by cardiology 2D echo revealed preserved left ventricular systolic function and grade 2 diastolic dysfunction.  Patient returns with worsening shortness of breath, productive cough and increase in oxygen demands.  She has been compliant with all her medications and inhalers.  She reports subjective fevers and chills.  She denies any chest pain or lightheadedness    Interval Problem Update  Worsening of renal function    Consultants/Specialty  nephrology    Code Status  full    Disposition  pending    Review of Systems  Review of Systems   Constitutional: Negative for chills, diaphoresis and weight loss.   HENT: Negative for ear discharge, ear pain and nosebleeds.    Eyes: Negative for double vision, photophobia and pain.   Respiratory: Positive for cough and shortness of breath.    Cardiovascular: Positive for leg swelling. Negative for chest pain and claudication.   Gastrointestinal: Negative for abdominal pain, nausea and vomiting.   Genitourinary: Negative for frequency and urgency.   Musculoskeletal: Positive for back pain. Negative for neck pain.   Skin: Positive for rash.   Neurological: Negative for dizziness,  tingling and headaches.        Physical Exam  Temp:  [36.4 °C (97.5 °F)-37.6 °C (99.6 °F)] 37 °C (98.6 °F)  Pulse:  [71-84] 84  Resp:  [16-20] 17  BP: (135-166)/(52-74) 164/74  SpO2:  [93 %-97 %] 96 %    Physical Exam   Constitutional: She is oriented to person, place, and time. No distress.   HENT:   Right Ear: External ear normal.   Eyes: Right eye exhibits no discharge. Left eye exhibits no discharge.   Neck: No JVD present.   Cardiovascular: Normal heart sounds.    Pulmonary/Chest: No stridor. No respiratory distress. She has no wheezes. She has rales.   Abdominal: There is no tenderness. There is no rebound and no guarding.   Morbidly obese   Neurological: She is alert and oriented to person, place, and time.   Skin: Rash (chronic  erythema on the left leg) noted. She is not diaphoretic.       Fluids    Intake/Output Summary (Last 24 hours) at 06/17/19 1201  Last data filed at 06/17/19 1029   Gross per 24 hour   Intake              200 ml   Output              500 ml   Net             -300 ml       Laboratory  Recent Labs      06/14/19 2210 06/16/19   0100  06/16/19   0905  06/16/19   1713  06/17/19   0101   WBC  15.5*   --   11.5*   --    --   11.7*   RBC  2.56*   --   2.66*   --    --   2.86*   HEMOGLOBIN  7.3*   < >  7.5*  7.8*  7.4*  8.0*   HEMATOCRIT  23.4*   --   24.9*   --    --   26.3*   MCV  91.4   --   93.6   --    --   92.0   MCH  28.5   --   28.2   --    --   28.0   MCHC  31.2*   --   30.1*   --    --   30.4*   RDW  59.0*   --   58.9*   --    --   54.4*   PLATELETCT  238   --   216   --    --   223   MPV  8.8*   --   9.3   --    --   9.5    < > = values in this interval not displayed.     Recent Labs      06/14/19 2210 06/16/19   0100  06/17/19   0101   SODIUM  135  140  134*   POTASSIUM  4.5  4.5  4.9   CHLORIDE  99  101  99   CO2  28  30  26   GLUCOSE  274*  171*  393*   BUN  31*  34*  35*   CREATININE  2.10*  2.40*  2.01*   CALCIUM  8.7  8.5  8.9     Recent Labs      06/15/19   0245   APTT   32.0   INR  1.09     Recent Labs      06/14/19   2325   BNPBTYPENAT  238*           Imaging      Assessment/Plan  HCAP (healthcare-associated pneumonia)- (present on admission)   Assessment & Plan    On rocephin  On doxycycline  On o2       HAN (acute kidney injury) (HCC)- (present on admission)   Assessment & Plan    D/w  nephrology   renal ultrasound showed:No hydronephrosis is seen.  Cardiac echo in June/2019 showed:  Normal left ventricular systolic function.  Left ventricular ejection fraction is visually estimated to be 60%.  Grade II diastolic dysfunction.  The right ventricle was normal in size and function.  Moderate mitral stenosis.  Mean transvalvular gradient is 9 mmHg at a heart rate of  BPM.  Mild aortic stenosis.  Compared to the images of the prior study done 3/16/2017 -  there is   now evidence for mild aortic stenosis. The estimate of right   ventricular systolic pressure cannot be made on the current exam,   previously 60 mmHg.         Acute on chronic respiratory failure with hypoxia (HCC)- (present on admission)   Assessment & Plan    Secondary to HCAP   Does not appear to be in heart failure  Appears to be in acute copd exacerbation     COPD (chronic obstructive pulmonary disease) (HCC)- (present on admission)   Assessment & Plan    Will monitor  Acute on chronic with acute resp failure  Continue Anoro Ellipta  resp protocol  solumderol       DM type 2, uncontrolled, with renal complications (HCC)- (present on admission)   Assessment & Plan    Not well controlled  Increased lantus  S.s.insulin  hema1c is pending  accu checks are noted         Anemia- (present on admission)   Assessment & Plan    Occult blood positive  Iron deficient  No sign of bleed  b12 and folic normal  Transfuse iv iron       Acute diastolic congestive heart failure (HCC)- (present on admission)   Assessment & Plan    Not in acute failure  Will monitor     Class 3 severe obesity due to excess calories with serious  comorbidity and body mass index (BMI) of 40.0 to 44.9 in adult (HCC)- (present on admission)   Assessment & Plan    Body mass index is 40.27 kg/m².       Essential hypertension- (present on admission)   Assessment & Plan    Not controlled  Resumed cardizam cd  Hydralazine iv prn          VTE prophylaxis: scd

## 2019-06-17 NOTE — PROGRESS NOTES
· 2 RN skin check complete with SHITAL Magana.  · Devices in place Silicone nasal cannula.  · Skin assessed under devices yes.  · Confirmed pressure ulcers found on N.A.  · New potential pressure ulcers noted on N/A. Wound consult placed and wound reported.  · The following interventions in place Patient turns self and encouraged to turn at least every 2 hours, pillows in use for support and positioning, heels floated on pillow, silicone nasal cannula    · Bilateral breasts moist and red  · Pannus red and moist  · [place]/groin area red and moist  · Bilateral ears pink and blanching  · Generalized bruising to BUE and BLE  · Bilateral heels boggy and blanching  · Scab to right foot on 4th digit  · Sacrum red and blanching

## 2019-06-17 NOTE — CARE PLAN
Problem: Safety  Goal: Will remain free from falls  Outcome: PROGRESSING AS EXPECTED  Bed locked in lowest position and call light is within reach; patient calls appropriately.

## 2019-06-18 PROBLEM — S82.831A CLOSED FRACTURE OF DISTAL END OF RIGHT FIBULA: Status: RESOLVED | Noted: 2019-02-08 | Resolved: 2019-06-18

## 2019-06-18 PROBLEM — D62 ANEMIA ASSOCIATED WITH ACUTE BLOOD LOSS: Status: RESOLVED | Noted: 2019-02-10 | Resolved: 2019-06-18

## 2019-06-18 PROBLEM — Z20.5 EXPOSURE TO HEPATITIS C: Status: RESOLVED | Noted: 2017-11-15 | Resolved: 2019-06-18

## 2019-06-18 PROBLEM — D64.9 ANEMIA REQUIRING TRANSFUSIONS: Status: RESOLVED | Noted: 2019-06-03 | Resolved: 2019-06-18

## 2019-06-18 PROBLEM — M25.511 ACUTE PAIN OF RIGHT SHOULDER: Status: RESOLVED | Noted: 2019-02-26 | Resolved: 2019-06-18

## 2019-06-18 LAB
ALBUMIN SERPL BCP-MCNC: 3.2 G/DL (ref 3.2–4.9)
ALBUMIN/GLOB SERPL: 1.2 G/DL
ALP SERPL-CCNC: 44 U/L (ref 30–99)
ALT SERPL-CCNC: 33 U/L (ref 2–50)
ANION GAP SERPL CALC-SCNC: 8 MMOL/L (ref 0–11.9)
AST SERPL-CCNC: 28 U/L (ref 12–45)
BASOPHILS # BLD AUTO: 0.1 % (ref 0–1.8)
BASOPHILS # BLD: 0.03 K/UL (ref 0–0.12)
BILIRUB SERPL-MCNC: 0.3 MG/DL (ref 0.1–1.5)
BUN SERPL-MCNC: 45 MG/DL (ref 8–22)
CALCIUM SERPL-MCNC: 9.1 MG/DL (ref 8.5–10.5)
CHLORIDE SERPL-SCNC: 99 MMOL/L (ref 96–112)
CO2 SERPL-SCNC: 26 MMOL/L (ref 20–33)
CREAT SERPL-MCNC: 1.96 MG/DL (ref 0.5–1.4)
EOSINOPHIL # BLD AUTO: 0 K/UL (ref 0–0.51)
EOSINOPHIL NFR BLD: 0 % (ref 0–6.9)
ERYTHROCYTE [DISTWIDTH] IN BLOOD BY AUTOMATED COUNT: 56.8 FL (ref 35.9–50)
GLOBULIN SER CALC-MCNC: 2.6 G/DL (ref 1.9–3.5)
GLUCOSE BLD-MCNC: 251 MG/DL (ref 65–99)
GLUCOSE SERPL-MCNC: 324 MG/DL (ref 65–99)
HCT VFR BLD AUTO: 26.4 % (ref 37–47)
HGB BLD-MCNC: 8 G/DL (ref 12–16)
IMM GRANULOCYTES # BLD AUTO: 0.27 K/UL (ref 0–0.11)
IMM GRANULOCYTES NFR BLD AUTO: 1.3 % (ref 0–0.9)
LYMPHOCYTES # BLD AUTO: 0.5 K/UL (ref 1–4.8)
LYMPHOCYTES NFR BLD: 2.4 % (ref 22–41)
MCH RBC QN AUTO: 28.6 PG (ref 27–33)
MCHC RBC AUTO-ENTMCNC: 30.3 G/DL (ref 33.6–35)
MCV RBC AUTO: 94.3 FL (ref 81.4–97.8)
MONOCYTES # BLD AUTO: 0.29 K/UL (ref 0–0.85)
MONOCYTES NFR BLD AUTO: 1.4 % (ref 0–13.4)
NEUTROPHILS # BLD AUTO: 20.18 K/UL (ref 2–7.15)
NEUTROPHILS NFR BLD: 94.8 % (ref 44–72)
NRBC # BLD AUTO: 0.02 K/UL
NRBC BLD-RTO: 0.1 /100 WBC
PLATELET # BLD AUTO: 229 K/UL (ref 164–446)
PMV BLD AUTO: 9.3 FL (ref 9–12.9)
POTASSIUM SERPL-SCNC: 5 MMOL/L (ref 3.6–5.5)
PROT SERPL-MCNC: 5.8 G/DL (ref 6–8.2)
RBC # BLD AUTO: 2.8 M/UL (ref 4.2–5.4)
SODIUM SERPL-SCNC: 133 MMOL/L (ref 135–145)
WBC # BLD AUTO: 21.3 K/UL (ref 4.8–10.8)

## 2019-06-18 PROCEDURE — 97165 OT EVAL LOW COMPLEX 30 MIN: CPT

## 2019-06-18 PROCEDURE — 80053 COMPREHEN METABOLIC PANEL: CPT

## 2019-06-18 PROCEDURE — 770020 HCHG ROOM/CARE - TELE (206)

## 2019-06-18 PROCEDURE — 700102 HCHG RX REV CODE 250 W/ 637 OVERRIDE(OP): Performed by: INTERNAL MEDICINE

## 2019-06-18 PROCEDURE — 700105 HCHG RX REV CODE 258: Performed by: INTERNAL MEDICINE

## 2019-06-18 PROCEDURE — 99233 SBSQ HOSP IP/OBS HIGH 50: CPT | Performed by: INTERNAL MEDICINE

## 2019-06-18 PROCEDURE — 85025 COMPLETE CBC W/AUTO DIFF WBC: CPT

## 2019-06-18 PROCEDURE — 700111 HCHG RX REV CODE 636 W/ 250 OVERRIDE (IP): Performed by: INTERNAL MEDICINE

## 2019-06-18 PROCEDURE — 36415 COLL VENOUS BLD VENIPUNCTURE: CPT

## 2019-06-18 PROCEDURE — 700111 HCHG RX REV CODE 636 W/ 250 OVERRIDE (IP): Performed by: FAMILY MEDICINE

## 2019-06-18 PROCEDURE — A9270 NON-COVERED ITEM OR SERVICE: HCPCS | Performed by: INTERNAL MEDICINE

## 2019-06-18 PROCEDURE — 82962 GLUCOSE BLOOD TEST: CPT

## 2019-06-18 PROCEDURE — 99232 SBSQ HOSP IP/OBS MODERATE 35: CPT | Performed by: INTERNAL MEDICINE

## 2019-06-18 PROCEDURE — 700105 HCHG RX REV CODE 258

## 2019-06-18 PROCEDURE — 700102 HCHG RX REV CODE 250 W/ 637 OVERRIDE(OP): Performed by: FAMILY MEDICINE

## 2019-06-18 PROCEDURE — A9270 NON-COVERED ITEM OR SERVICE: HCPCS | Performed by: FAMILY MEDICINE

## 2019-06-18 RX ORDER — AMOXICILLIN AND CLAVULANATE POTASSIUM 875; 125 MG/1; MG/1
1 TABLET, FILM COATED ORAL EVERY 12 HOURS
Status: DISCONTINUED | OUTPATIENT
Start: 2019-06-18 | End: 2019-06-21 | Stop reason: HOSPADM

## 2019-06-18 RX ORDER — POTASSIUM CHLORIDE 20 MEQ/1
20 TABLET, EXTENDED RELEASE ORAL DAILY
Status: DISCONTINUED | OUTPATIENT
Start: 2019-06-19 | End: 2019-06-21 | Stop reason: HOSPADM

## 2019-06-18 RX ORDER — FUROSEMIDE 40 MG/1
40 TABLET ORAL
Status: DISCONTINUED | OUTPATIENT
Start: 2019-06-18 | End: 2019-06-21 | Stop reason: HOSPADM

## 2019-06-18 RX ORDER — FUROSEMIDE 40 MG/1
40 TABLET ORAL DAILY
Status: DISCONTINUED | OUTPATIENT
Start: 2019-06-18 | End: 2019-06-18

## 2019-06-18 RX ORDER — PREDNISONE 20 MG/1
40 TABLET ORAL DAILY
Status: DISCONTINUED | OUTPATIENT
Start: 2019-06-19 | End: 2019-06-21 | Stop reason: HOSPADM

## 2019-06-18 RX ORDER — INSULIN GLARGINE 100 [IU]/ML
30 INJECTION, SOLUTION SUBCUTANEOUS NIGHTLY
Status: DISCONTINUED | OUTPATIENT
Start: 2019-06-18 | End: 2019-06-21 | Stop reason: HOSPADM

## 2019-06-18 RX ORDER — SODIUM CHLORIDE 9 MG/ML
INJECTION, SOLUTION INTRAVENOUS
Status: COMPLETED
Start: 2019-06-18 | End: 2019-06-18

## 2019-06-18 RX ADMIN — CEFTRIAXONE SODIUM 2 G: 2 INJECTION, POWDER, FOR SOLUTION INTRAMUSCULAR; INTRAVENOUS at 00:08

## 2019-06-18 RX ADMIN — OMEPRAZOLE 20 MG: 20 CAPSULE, DELAYED RELEASE ORAL at 18:31

## 2019-06-18 RX ADMIN — AMOXICILLIN AND CLAVULANATE POTASSIUM 1 TABLET: 875; 125 TABLET, FILM COATED ORAL at 18:31

## 2019-06-18 RX ADMIN — DILTIAZEM HYDROCHLORIDE 120 MG: 120 CAPSULE, COATED, EXTENDED RELEASE ORAL at 06:09

## 2019-06-18 RX ADMIN — INSULIN GLARGINE 30 UNITS: 100 INJECTION, SOLUTION SUBCUTANEOUS at 21:49

## 2019-06-18 RX ADMIN — OMEPRAZOLE 20 MG: 20 CAPSULE, DELAYED RELEASE ORAL at 06:09

## 2019-06-18 RX ADMIN — ROSUVASTATIN CALCIUM 10 MG: 10 TABLET, FILM COATED ORAL at 06:09

## 2019-06-18 RX ADMIN — DOXYCYCLINE 100 MG: 100 TABLET, FILM COATED ORAL at 06:09

## 2019-06-18 RX ADMIN — NYSTATIN: 100000 POWDER TOPICAL at 21:49

## 2019-06-18 RX ADMIN — TRAZODONE HYDROCHLORIDE 150 MG: 50 TABLET ORAL at 21:46

## 2019-06-18 RX ADMIN — FUROSEMIDE 40 MG: 40 TABLET ORAL at 14:09

## 2019-06-18 RX ADMIN — SENNOSIDES,DOCUSATE SODIUM 2 TABLET: 8.6; 5 TABLET, FILM COATED ORAL at 18:31

## 2019-06-18 RX ADMIN — SENNOSIDES,DOCUSATE SODIUM 2 TABLET: 8.6; 5 TABLET, FILM COATED ORAL at 06:09

## 2019-06-18 RX ADMIN — DOXYCYCLINE 100 MG: 100 TABLET, FILM COATED ORAL at 18:31

## 2019-06-18 RX ADMIN — CYCLOBENZAPRINE HYDROCHLORIDE 5 MG: 10 TABLET, FILM COATED ORAL at 21:51

## 2019-06-18 RX ADMIN — LEVOTHYROXINE SODIUM 75 MCG: 75 TABLET ORAL at 06:09

## 2019-06-18 RX ADMIN — OXYCODONE HYDROCHLORIDE 5 MG: 5 TABLET ORAL at 14:09

## 2019-06-18 RX ADMIN — DULOXETINE HYDROCHLORIDE 60 MG: 60 CAPSULE, DELAYED RELEASE ORAL at 06:09

## 2019-06-18 RX ADMIN — METHYLPREDNISOLONE SODIUM SUCCINATE 40 MG: 40 INJECTION, POWDER, FOR SOLUTION INTRAMUSCULAR; INTRAVENOUS at 06:10

## 2019-06-18 RX ADMIN — NYSTATIN: 100000 POWDER TOPICAL at 06:10

## 2019-06-18 RX ADMIN — AMITRIPTYLINE HYDROCHLORIDE 50 MG: 25 TABLET, FILM COATED ORAL at 21:46

## 2019-06-18 RX ADMIN — OXYCODONE HYDROCHLORIDE 5 MG: 5 TABLET ORAL at 21:52

## 2019-06-18 RX ADMIN — SODIUM CHLORIDE: 9 INJECTION, SOLUTION INTRAVENOUS at 00:15

## 2019-06-18 ASSESSMENT — ENCOUNTER SYMPTOMS
BACK PAIN: 1
PALPITATIONS: 0
SORE THROAT: 0
FEVER: 0
DIZZINESS: 0
SHORTNESS OF BREATH: 0
CHILLS: 0
HEARTBURN: 0
SPUTUM PRODUCTION: 1
COUGH: 1
VOMITING: 0
ABDOMINAL PAIN: 0
NAUSEA: 0

## 2019-06-18 ASSESSMENT — COGNITIVE AND FUNCTIONAL STATUS - GENERAL
DAILY ACTIVITIY SCORE: 22
SUGGESTED CMS G CODE MODIFIER DAILY ACTIVITY: CJ
HELP NEEDED FOR BATHING: A LITTLE
DRESSING REGULAR LOWER BODY CLOTHING: A LITTLE

## 2019-06-18 ASSESSMENT — ACTIVITIES OF DAILY LIVING (ADL): TOILETING: INDEPENDENT

## 2019-06-18 NOTE — PROGRESS NOTES
Hospital Medicine Daily Progress Note    Date of Service  6/18/2019    Chief Complaint  68 y.o. female admitted 6/14/2019 with shortness of breath     Hospital Course    68-year-old female past medical history morbid obesity, EDITH on CPAP machine, COPD on chronic 4 L of oxygen, diabetes mellitus, hypertension was admitted 6/14/2018 for COPD exacerbation and possible hospital-acquired pneumonia. She has been treated 6 times with antibiotic last 6 months. Last admission 06/03/2019. She was hypoxic on admission. CXR showed diffuse interstitial prominence and bibasilar opacity. She was started on IV ceftriaxone and doxy, methyl prednisone. Pro-calcitonin was normal.  On admission was noted downtrending of hemoglobin and occult blood was positive.  1 unit of PRBC was given.  Gastroenterologist was consulted and did recommend outpatient endoscopy. She has chronic kidney disease stage III. On admission she did have acute on chronic kidney injury which did improve to her baseline. Troponin and BNP was slight elevated. Echocardiogram was done and showed grade II diastolic dysfunction, moderate mitral stenosis, RSVP prior 60. Lasix was given and pt noticed some improvement on her leg swelling.        Interval Problem Update  She is feeling better. She is feeling cough is loosen. Breathing is almost her baseline. Still coughing. She is concerned for review of CXR because she has been treated 6 times last 6 months      Consultants/Specialty  Gastroenterologist   Nephrologist     Code Status  Full code     Disposition  Inpatient     Review of Systems  Review of Systems   Constitutional: Negative for chills and fever.   HENT: Positive for congestion. Negative for sore throat.    Respiratory: Positive for cough and sputum production.    Cardiovascular: Positive for leg swelling. Negative for chest pain and palpitations.   Gastrointestinal: Negative for abdominal pain, heartburn, nausea and vomiting.   Genitourinary: Negative for  dysuria.   Musculoskeletal: Positive for back pain.   Skin: Negative for rash.   Neurological: Negative for dizziness.        Physical Exam  Temp:  [36.4 °C (97.5 °F)-36.7 °C (98 °F)] 36.4 °C (97.6 °F)  Pulse:  [69-98] 69  Resp:  [17-20] 20  BP: (114-159)/(53-78) 141/63  SpO2:  [92 %-98 %] 96 %    Physical Exam   Constitutional: She is oriented to person, place, and time. She appears well-developed. No distress.   HENT:   Head: Normocephalic and atraumatic.   Eyes: Pupils are equal, round, and reactive to light. EOM are normal.   Neck: Normal range of motion.   Cardiovascular: Normal rate and regular rhythm.    Murmur heard.  Pulmonary/Chest: Effort normal. No respiratory distress. She has no wheezes. She has rales.   Abdominal: Soft. Bowel sounds are normal. She exhibits no distension. There is no tenderness.   Musculoskeletal: Normal range of motion. She exhibits edema.   Neurological: She is alert and oriented to person, place, and time. No cranial nerve deficit.   Skin: Skin is warm.       Fluids  No intake or output data in the 24 hours ending 06/18/19 1126    Laboratory  Recent Labs      06/16/19 0100 06/16/19   1713  06/17/19 0101 06/18/19   0234   WBC  11.5*   --    --   11.7*  21.3*   RBC  2.66*   --    --   2.86*  2.80*   HEMOGLOBIN  7.5*   < >  7.4*  8.0*  8.0*   HEMATOCRIT  24.9*   --    --   26.3*  26.4*   MCV  93.6   --    --   92.0  94.3   MCH  28.2   --    --   28.0  28.6   MCHC  30.1*   --    --   30.4*  30.3*   RDW  58.9*   --    --   54.4*  56.8*   PLATELETCT  216   --    --   223  229   MPV  9.3   --    --   9.5  9.3    < > = values in this interval not displayed.     Recent Labs      06/16/19   0100  06/17/19   0101 06/18/19   0234   SODIUM  140  134*  133*   POTASSIUM  4.5  4.9  5.0   CHLORIDE  101  99  99   CO2  30  26  26   GLUCOSE  171*  393*  324*   BUN  34*  35*  45*   CREATININE  2.40*  2.01*  1.96*   CALCIUM  8.5  8.9  9.1                   Imaging  US-RENAL   Final Result       No hydronephrosis is seen.      DX-CHEST-PORTABLE (1 VIEW)   Final Result         Diffuse interstitial prominence could relate to mild pulmonary edema or atypical infection.      Patchy bibasilar opacities, likely atelectasis.      Stable cardiomegaly.           Assessment/Plan  HCAP (healthcare-associated pneumonia)- (present on admission)   Assessment & Plan     Rocephin (06/15-06/18),  Doxycycline (06/15-06/22). I started Augmentin (end date 06/22)  (allergic to levofloxacin) . Pt is difficult to have IV access. Sputum culture are positive for gram positive rods/ gram positive cocci. Recent hospitalization 06/03.   She is on chronic oxygen L at home. Close to her baseline  Continue RT protocol  Repeat CXR per pt request    Leukocytosis is possible due to steroid use        Atrial fibrillation (HCC)- (present on admission)   Assessment & Plan    She is not on anticoagulation. Sinus rhythm today   Continue diltiazem  RODOLFO-VASC score 5. No need for bridge at this time . Follow up with PCP      HAN (acute kidney injury) ([facility])- (present on admission)   Assessment & Plan    Seems pt has CKD stage III. Worse on admission due to Pneumonia ?? D/w  nephrology   renal ultrasound showed:No hydronephrosis is seen.  Improving back to her baseline. Continue to monitor   Avoid nephrotoxic medication. Continue to monitor        Acute on chronic respiratory failure with hypoxia ([facility])- (present on admission)   Assessment & Plan    Secondary to HCAP /COPD exacerbation   Echocardiogram showed diastolic dysfunction stage II, but no signs of CHF exacerbation   Almost back to her baseline        COPD (chronic obstructive pulmonary disease) ([facility])- (present on admission)   Assessment & Plan    Will monitor  Acute on chronic with acute resp failure  Continue Anoro Ellipta  resp protocol  Switch to prednisone 40 mg daily (for 5 days)        DM type 2, uncontrolled, with renal complications ([facility])- (present on admission)   Assessment & Plan     Not well controlled due to steroid use   Increased lantus 30 Units   S.s.insulin  A1c 6.7   accu checks are noted         Anemia- (present on admission)   Assessment & Plan    Occult blood positive  Iron deficient  No sign of bleed  b12 and folic normal  Transfuse iv iron  Follow up with GI        Acute diastolic congestive heart failure (HCC)- (present on admission)   Assessment & Plan    Not in acute failure  Will monitor  Resume lasix as her home medications      Class 3 severe obesity due to excess calories with serious comorbidity and body mass index (BMI) of 40.0 to 44.9 in adult (HCC)- (present on admission)   Assessment & Plan    Body mass index is 40.27 kg/m².       Essential hypertension- (present on admission)   Assessment & Plan    Not controlled  Resumed cardizam cd  Hydralazine iv prn          VTE prophylaxis: SCD (positive occult blood)

## 2019-06-18 NOTE — PROGRESS NOTES
Received report from night shift RN Jacki. Assumed care of patient. Patient is SR on the monitor at this time. Patient is sitting up in bed with no family present at bedside at this time. Patient declines any needs at this time. Plan of care discussed with patient. Bed locked and in the lowest position with call light within reach.

## 2019-06-18 NOTE — PROGRESS NOTES
2 RN skin check complete.   Devices in place: PIV, NC.  Skin assessed under devices: yes.  Confirmed pressure ulcers found on: none.  New potential pressure ulcers noted on: none. Wound consult placed: no.  The following interventions in place: waffle mattress, extra pillows for support, clean and dry linen on hospital bed, moisturizer, pt turns and repositions independently, interdrys.    Bilat ears pink, intact, blanching.  Bilat elbows intact and blanching.  Generalized bruising to bilat UE.  Moist, pink, blanching under bilat breastfolds.   Moisture fissure noted mid pannus. Pannus pink, moist, blanching.  Sacrum intact, pink and blanching.   Bilat lower extremities pink/red, intact, blanching, edematous.  Heels bilat intact and blanching.

## 2019-06-18 NOTE — ASSESSMENT & PLAN NOTE
She is not on anticoagulation. Sinus rhythm today   Continue diltiazem  RODOLFO-VASC score 5. No need for bridge at this time . Follow up with PCP

## 2019-06-18 NOTE — DISCHARGE PLANNING
Anticipated Discharge Disposition:     Assisted living facility in [facility]  The patient is going to call her friends to get a ride.  Care Transition Team Assessment    Information Source  Orientation : Oriented x 4  Information Given By: Patient         Elopement Risk  Legal Hold: No  Ambulatory or Self Mobile in Wheelchair: Yes  Disoriented: No  Psychiatric Symptoms: None  History of Wandering: No  Elopement this Admit: No  Vocalizing Wanting to Leave: No  Displays Behaviors, Body Language Wanting to Leave: No-Not at Risk for Elopement  Elopement Risk: Not at Risk for Elopement    Interdisciplinary Discharge Planning  Does Admitting Nurse Feel This Could be a Complex Discharge?: No  Lives with - Patient's Self Care Capacity: Other (Comments) ([facility] staff)  Patient or legal guardian wants to designate a caregiver (see row info): No  Support Systems: Other (Comments),  /  (PT supposed to move to assisted living tomorrow)  Housing / Facility: Assisted Living Residence  Do You Take your Prescribed Medications Regularly: Yes  Able to Return to Previous ADL's: No  Mobility Issues: Yes  Prior Services: Other (Comments) ([facility])  Assistance Needed: Yes  Durable Medical Equipment: Home Oxygen  DME Provider / Phone: [facility]    Discharge Preparedness  What are your discharge supports?:  (Friends)         Finances  Financial Barriers to Discharge: No    Vision / Hearing Impairment  Vision Impairment : No  Right Eye Vision: Impaired, Wears Glasses  Left Eye Vision: Impaired, Wears Glasses  Hearing Impairment : No              Domestic Abuse  Have you ever been the victim of abuse or violence?: No  Physical Abuse or Sexual Abuse: No  Possible Abuse Reported to:: Not Applicable         Discharge Risks or Barriers  Patient risk factors: Complex medical needs    Anticipated Discharge Information  Anticipated discharge disposition: Assisted living

## 2019-06-18 NOTE — PROGRESS NOTES
Assumed care at 1900. Received report from SHITAL Lopez. First assessment completed, call light within reach. Fall precautions within place. Will continue to monitor.

## 2019-06-18 NOTE — PROGRESS NOTES
Nephrology Daily Progress Note    Date of Service  6/17/2019    Chief Complaint  68 y.o. Female with CKD III admitted 6/14/2019 with HAN    Interval Problem Update  Doing better  C/o both feet pain, fatigue  No dysuria  Creat level better    Review of Systems  Review of Systems   Constitutional: Negative for chills, fever, malaise/fatigue and weight loss.   HENT: Negative for congestion, hearing loss and sinus pain.    Eyes: Negative.    Respiratory: Negative for cough, hemoptysis, shortness of breath and wheezing.    Cardiovascular: Negative for chest pain, palpitations, orthopnea and leg swelling.   Gastrointestinal: Negative for abdominal pain, diarrhea, nausea and vomiting.   Genitourinary: Negative for dysuria, flank pain, frequency, hematuria and urgency.   Musculoskeletal:        LE pain   Skin: Negative.    Neurological: Positive for sensory change. Negative for dizziness and focal weakness.        Physical Exam  Temp:  [36.6 °C (97.8 °F)-37.1 °C (98.7 °F)] 36.6 °C (97.8 °F)  Pulse:  [71-84] 77  Resp:  [16-20] 17  BP: (131-166)/(53-78) 136/53  SpO2:  [92 %-97 %] 92 %    Physical Exam   Constitutional: She is oriented to person, place, and time. She appears well-developed and well-nourished. No distress.   HENT:   Head: Normocephalic and atraumatic.   Nose: Nose normal.   Mouth/Throat: Oropharynx is clear and moist.   Eyes: Pupils are equal, round, and reactive to light. Conjunctivae and EOM are normal.   Neck: Normal range of motion. Neck supple. No thyromegaly present.   Cardiovascular: Normal rate and regular rhythm.  Exam reveals no gallop and no friction rub.    Pulmonary/Chest: Effort normal and breath sounds normal. No respiratory distress. She has no wheezes. She has no rales.   Abdominal: Soft. Bowel sounds are normal. She exhibits no distension and no mass. There is no tenderness. There is no guarding.   Musculoskeletal: She exhibits no edema.   Neurological: She is alert and oriented to person,  place, and time. No cranial nerve deficit.   Skin: Skin is warm. No rash noted. No erythema.   Nursing note and vitals reviewed.      Fluids    Intake/Output Summary (Last 24 hours) at 06/17/19 1937  Last data filed at 06/17/19 1029   Gross per 24 hour   Intake              200 ml   Output              500 ml   Net             -300 ml       Laboratory  Recent Labs      06/14/19 2210 06/16/19   0100  06/16/19   0905  06/16/19   1713  06/17/19   0101   WBC  15.5*   --   11.5*   --    --   11.7*   RBC  2.56*   --   2.66*   --    --   2.86*   HEMOGLOBIN  7.3*   < >  7.5*  7.8*  7.4*  8.0*   HEMATOCRIT  23.4*   --   24.9*   --    --   26.3*   MCV  91.4   --   93.6   --    --   92.0   MCH  28.5   --   28.2   --    --   28.0   MCHC  31.2*   --   30.1*   --    --   30.4*   RDW  59.0*   --   58.9*   --    --   54.4*   PLATELETCT  238   --   216   --    --   223   MPV  8.8*   --   9.3   --    --   9.5    < > = values in this interval not displayed.     Recent Labs      06/14/19 2210 06/16/19 0100 06/17/19   0101   SODIUM  135  140  134*   POTASSIUM  4.5  4.5  4.9   CHLORIDE  99  101  99   CO2  28  30  26   GLUCOSE  274*  171*  393*   BUN  31*  34*  35*   CREATININE  2.10*  2.40*  2.01*   CALCIUM  8.7  8.5  8.9     Recent Labs      06/15/19   0245   APTT  32.0   INR  1.09     Recent Labs      06/14/19   2325   BNPBTYPENAT  238*           Imaging  Renal US unrenarkable    Assessment/Plan  1.HAN/CKD III -creat better -to monitor  2.HTN: BP well controlled  3.Electrolytes: well controlled.  4.Anemia: low Hb level -better   5.Volume: well controlled   6.PNA    Recs: daily labs              Keep well hydrated              Avoid nephrotoxins              No need for emergent dialysis              Will follow

## 2019-06-18 NOTE — PROGRESS NOTES
"Cardiovascular Nurse Navigator () Advanced Heart Failure Program Inpatient Progress Note:    Per Dr. Macias on 6/17/19, this patient is not currently in acute heart failure. Therefore, a seven day HF f/u appt is not currently indicated.    Should clinical status change to acute HF, patient will require a seven calendar day f/u appt which can be achieved by placing a \"schedule heart failure follow up appointment\" order per protocol or by calling the hospital schedulers at [phone].     Of note, this patient has not seen cardiology per chart review. She has a HF diagnosis noted in a PCP office visit on 12/20/18.     Respectfully request that problems list be updated to reflect that the HF is chronic and not acutely decompensated so that staff are not confused about necessary interventions and patient does not receive a seven day HF follow up appointment which should be saved for HF patients who had exacerbations.    Thank you and please call with questions or concerns.    "

## 2019-06-18 NOTE — THERAPY
"Physical Therapy Evaluation completed.   Bed Mobility:  Supine to Sit: Modified Independent  Transfers: Sit to Stand: Supervised  Gait: Level Of Assist: Supervised with Front-Wheel Walker       Plan of Care: Patient with no further skilled PT needs in the acute care setting at this time and Patient demonstrates safety with mobility in this environment at this time.   Discharge Recommendations: Equipment: No Equipment Needed. Post-acute therapy Discharge to home with outpatient or home health for additional skilled therapy services.    See \"Rehab Therapy-Acute\" Patient Summary Report for complete documentation.       Pt is a 69 y/o female recently discharged from acute setting and returning for continued symptoms of SOB. She has now been moved into an [facility] in [place] and is continuing to mobilize at the same level as her previous admit and is most likely at her baseline functionally. Pt is mostly self limiting due to c/o BLE pain and edema but reports that she mostly uses a scooter for anything beyond the distances of her room at the [facility]. She would benefit from home health therapy should she be willing to participate. No further acute PT needs at this time.   "

## 2019-06-18 NOTE — THERAPY
"Occupational Therapy Evaluation completed.   Functional Status:  SPV for bed mobility, sit<>stand, functional mobility w/FWW, toilet txf, standing grooming, and LB dressing with adaptive equipment  Plan of Care: Patient with no further skilled OT needs in the acute care setting at this time  Discharge Recommendations:  Equipment: No Equipment Needed. Post-acute therapy Anticipate that the patient will have no further occupational therapy needs after discharge from the hospital.     See \"Rehab Therapy-Acute\" Patient Summary Report for complete documentation.    Pt is 67yo female admitted with SOB, pmhx includes COPD, chronic respiratory failure on 4L O2 at baseline and morbid obesity. Pt presents to OT eval modified independent for transfers, mobility of household spaces, and self-care ADLs. Pt has all DME installed at her apartment in a local residential, call lights in the bathroom for staff assistance, and meals are provided. Pt provided education on adaptive equipment to increase ease of independent LB dressing, pt plans use her reacher and obtain a sock-aid to don pants/socks more easily. Pt with no additional acute OT needs at this time Recommend d/c to residential.   "

## 2019-06-19 ENCOUNTER — APPOINTMENT (OUTPATIENT)
Dept: RADIOLOGY | Facility: MEDICAL CENTER | Age: 69
DRG: 193 | End: 2019-06-19
Attending: INTERNAL MEDICINE
Payer: MEDICARE

## 2019-06-19 PROBLEM — I34.0 NON-RHEUMATIC MITRAL REGURGITATION: Status: ACTIVE | Noted: 2019-06-19

## 2019-06-19 PROBLEM — I50.32 CHRONIC DIASTOLIC CONGESTIVE HEART FAILURE (HCC): Status: ACTIVE | Noted: 2019-06-15

## 2019-06-19 LAB
ALBUMIN SERPL BCP-MCNC: 3 G/DL (ref 3.2–4.9)
ALBUMIN/GLOB SERPL: 1.3 G/DL
ALP SERPL-CCNC: 45 U/L (ref 30–99)
ALT SERPL-CCNC: 33 U/L (ref 2–50)
ANION GAP SERPL CALC-SCNC: 8 MMOL/L (ref 0–11.9)
AST SERPL-CCNC: 26 U/L (ref 12–45)
BASOPHILS # BLD AUTO: 0.1 % (ref 0–1.8)
BASOPHILS # BLD: 0.02 K/UL (ref 0–0.12)
BILIRUB SERPL-MCNC: 0.3 MG/DL (ref 0.1–1.5)
BUN SERPL-MCNC: 49 MG/DL (ref 8–22)
CALCIUM SERPL-MCNC: 8.9 MG/DL (ref 8.5–10.5)
CHLORIDE SERPL-SCNC: 102 MMOL/L (ref 96–112)
CO2 SERPL-SCNC: 28 MMOL/L (ref 20–33)
CREAT SERPL-MCNC: 1.96 MG/DL (ref 0.5–1.4)
EOSINOPHIL # BLD AUTO: 0.01 K/UL (ref 0–0.51)
EOSINOPHIL NFR BLD: 0 % (ref 0–6.9)
ERYTHROCYTE [DISTWIDTH] IN BLOOD BY AUTOMATED COUNT: 56.9 FL (ref 35.9–50)
GLOBULIN SER CALC-MCNC: 2.3 G/DL (ref 1.9–3.5)
GLUCOSE BLD-MCNC: 167 MG/DL (ref 65–99)
GLUCOSE SERPL-MCNC: 313 MG/DL (ref 65–99)
HCT VFR BLD AUTO: 24 % (ref 37–47)
HGB BLD-MCNC: 7.2 G/DL (ref 12–16)
IMM GRANULOCYTES # BLD AUTO: 0.4 K/UL (ref 0–0.11)
IMM GRANULOCYTES NFR BLD AUTO: 1.9 % (ref 0–0.9)
LYMPHOCYTES # BLD AUTO: 1.17 K/UL (ref 1–4.8)
LYMPHOCYTES NFR BLD: 5.5 % (ref 22–41)
MCH RBC QN AUTO: 27.8 PG (ref 27–33)
MCHC RBC AUTO-ENTMCNC: 30 G/DL (ref 33.6–35)
MCV RBC AUTO: 92.7 FL (ref 81.4–97.8)
MONOCYTES # BLD AUTO: 1.2 K/UL (ref 0–0.85)
MONOCYTES NFR BLD AUTO: 5.7 % (ref 0–13.4)
NEUTROPHILS # BLD AUTO: 18.29 K/UL (ref 2–7.15)
NEUTROPHILS NFR BLD: 86.8 % (ref 44–72)
NRBC # BLD AUTO: 0 K/UL
NRBC BLD-RTO: 0 /100 WBC
PLATELET # BLD AUTO: 217 K/UL (ref 164–446)
PMV BLD AUTO: 8.9 FL (ref 9–12.9)
POTASSIUM SERPL-SCNC: 4.9 MMOL/L (ref 3.6–5.5)
PROT SERPL-MCNC: 5.3 G/DL (ref 6–8.2)
RBC # BLD AUTO: 2.59 M/UL (ref 4.2–5.4)
SODIUM SERPL-SCNC: 138 MMOL/L (ref 135–145)
WBC # BLD AUTO: 21.1 K/UL (ref 4.8–10.8)

## 2019-06-19 PROCEDURE — 82962 GLUCOSE BLOOD TEST: CPT

## 2019-06-19 PROCEDURE — 700102 HCHG RX REV CODE 250 W/ 637 OVERRIDE(OP): Performed by: INTERNAL MEDICINE

## 2019-06-19 PROCEDURE — A6250 SKIN SEAL PROTECT MOISTURIZR: HCPCS | Performed by: INTERNAL MEDICINE

## 2019-06-19 PROCEDURE — 700111 HCHG RX REV CODE 636 W/ 250 OVERRIDE (IP): Performed by: INTERNAL MEDICINE

## 2019-06-19 PROCEDURE — 80053 COMPREHEN METABOLIC PANEL: CPT

## 2019-06-19 PROCEDURE — 85025 COMPLETE CBC W/AUTO DIFF WBC: CPT

## 2019-06-19 PROCEDURE — 770020 HCHG ROOM/CARE - TELE (206)

## 2019-06-19 PROCEDURE — 700102 HCHG RX REV CODE 250 W/ 637 OVERRIDE(OP): Performed by: FAMILY MEDICINE

## 2019-06-19 PROCEDURE — A9270 NON-COVERED ITEM OR SERVICE: HCPCS | Performed by: FAMILY MEDICINE

## 2019-06-19 PROCEDURE — 306589 SLEEVE,VASO CALF LARGE: Performed by: INTERNAL MEDICINE

## 2019-06-19 PROCEDURE — 36415 COLL VENOUS BLD VENIPUNCTURE: CPT

## 2019-06-19 PROCEDURE — A9270 NON-COVERED ITEM OR SERVICE: HCPCS | Performed by: INTERNAL MEDICINE

## 2019-06-19 PROCEDURE — 99232 SBSQ HOSP IP/OBS MODERATE 35: CPT | Performed by: INTERNAL MEDICINE

## 2019-06-19 PROCEDURE — 71045 X-RAY EXAM CHEST 1 VIEW: CPT

## 2019-06-19 RX ORDER — DIPHENHYDRAMINE HYDROCHLORIDE 50 MG/ML
25 INJECTION INTRAMUSCULAR; INTRAVENOUS ONCE
Status: DISCONTINUED | OUTPATIENT
Start: 2019-06-19 | End: 2019-06-19

## 2019-06-19 RX ORDER — ACETAMINOPHEN 325 MG/1
650 TABLET ORAL ONCE
Status: DISCONTINUED | OUTPATIENT
Start: 2019-06-19 | End: 2019-06-19

## 2019-06-19 RX ORDER — FERROUS GLUCONATE 324(38)MG
324 TABLET ORAL
Status: DISCONTINUED | OUTPATIENT
Start: 2019-06-20 | End: 2019-06-21 | Stop reason: HOSPADM

## 2019-06-19 RX ORDER — DIPHENHYDRAMINE HCL 25 MG
25 TABLET ORAL ONCE
Status: DISCONTINUED | OUTPATIENT
Start: 2019-06-19 | End: 2019-06-19

## 2019-06-19 RX ADMIN — FUROSEMIDE 40 MG: 40 TABLET ORAL at 05:31

## 2019-06-19 RX ADMIN — OMEPRAZOLE 20 MG: 20 CAPSULE, DELAYED RELEASE ORAL at 05:31

## 2019-06-19 RX ADMIN — DILTIAZEM HYDROCHLORIDE 120 MG: 120 CAPSULE, COATED, EXTENDED RELEASE ORAL at 05:31

## 2019-06-19 RX ADMIN — FUROSEMIDE 40 MG: 40 TABLET ORAL at 18:34

## 2019-06-19 RX ADMIN — OXYCODONE HYDROCHLORIDE 5 MG: 5 TABLET ORAL at 21:29

## 2019-06-19 RX ADMIN — INSULIN GLARGINE 30 UNITS: 100 INJECTION, SOLUTION SUBCUTANEOUS at 21:32

## 2019-06-19 RX ADMIN — AMITRIPTYLINE HYDROCHLORIDE 50 MG: 25 TABLET, FILM COATED ORAL at 21:30

## 2019-06-19 RX ADMIN — NYSTATIN: 100000 POWDER TOPICAL at 05:31

## 2019-06-19 RX ADMIN — CYCLOBENZAPRINE HYDROCHLORIDE 5 MG: 10 TABLET, FILM COATED ORAL at 21:29

## 2019-06-19 RX ADMIN — ROSUVASTATIN CALCIUM 10 MG: 10 TABLET, FILM COATED ORAL at 05:31

## 2019-06-19 RX ADMIN — LEVOTHYROXINE SODIUM 75 MCG: 75 TABLET ORAL at 05:31

## 2019-06-19 RX ADMIN — PREDNISONE 40 MG: 20 TABLET ORAL at 05:32

## 2019-06-19 RX ADMIN — DOXYCYCLINE 100 MG: 100 TABLET, FILM COATED ORAL at 18:34

## 2019-06-19 RX ADMIN — POTASSIUM CHLORIDE 20 MEQ: 20 TABLET, EXTENDED RELEASE ORAL at 05:31

## 2019-06-19 RX ADMIN — OMEPRAZOLE 20 MG: 20 CAPSULE, DELAYED RELEASE ORAL at 18:34

## 2019-06-19 RX ADMIN — NYSTATIN: 100000 POWDER TOPICAL at 21:30

## 2019-06-19 RX ADMIN — TRAZODONE HYDROCHLORIDE 150 MG: 50 TABLET ORAL at 21:28

## 2019-06-19 RX ADMIN — DOXYCYCLINE 100 MG: 100 TABLET, FILM COATED ORAL at 05:31

## 2019-06-19 RX ADMIN — AMOXICILLIN AND CLAVULANATE POTASSIUM 1 TABLET: 875; 125 TABLET, FILM COATED ORAL at 05:31

## 2019-06-19 RX ADMIN — OXYCODONE HYDROCHLORIDE 5 MG: 5 TABLET ORAL at 14:22

## 2019-06-19 RX ADMIN — SENNOSIDES,DOCUSATE SODIUM 2 TABLET: 8.6; 5 TABLET, FILM COATED ORAL at 18:34

## 2019-06-19 RX ADMIN — AMOXICILLIN AND CLAVULANATE POTASSIUM 1 TABLET: 875; 125 TABLET, FILM COATED ORAL at 18:34

## 2019-06-19 RX ADMIN — SENNOSIDES,DOCUSATE SODIUM 2 TABLET: 8.6; 5 TABLET, FILM COATED ORAL at 05:31

## 2019-06-19 RX ADMIN — DULOXETINE HYDROCHLORIDE 60 MG: 60 CAPSULE, DELAYED RELEASE ORAL at 05:31

## 2019-06-19 ASSESSMENT — ENCOUNTER SYMPTOMS
WHEEZING: 1
CHILLS: 0
DIZZINESS: 0
SORE THROAT: 0
COUGH: 1
ABDOMINAL PAIN: 0
NAUSEA: 0
HEARTBURN: 0
VOMITING: 0
PALPITATIONS: 0
BACK PAIN: 1
FEVER: 0
SPUTUM PRODUCTION: 1

## 2019-06-19 NOTE — FACE TO FACE
Face to Face Note  -  Durable Medical Equipment    Tasha Dewey M.D. - NPI: [id number]  I certify that this patient is under my care and that they have had a durable medical equipment(DME)face to face encounter by myself that meets the physician DME face-to-face encounter requirements with this patient on:    Date of encounter:   Patient:                    MRN:                       YOB: 2020  Priya Callahan  [id number]  1950     The encounter with the patient was in whole, or in part, for the following medical condition, which is the primary reason for durable medical equipment:  COPD    I certify that, based on my findings, the following durable medical equipment is medically necessary:  Oxygen.    HOME O2 Saturation Measurements:(Values must be present for Home Oxygen orders)  Room air sat at rest: 89  Room air sat with amb: 79  With liters of O2: 4, O2 sat at rest with O2: 95  With Liters of O2: 10, O2 sat with amb with O2 : 89  Is the patient mobile?: Yes    My Clinical findings support the need for the above equipment due to:  Hypoxia    Supporting Symptoms: shortness of breath, requesting higher level of oxygen      ------------------------------------------------------------------------------------------------------------------    Face to Face Supporting Documentation - Home Health    The encounter with this patient was in whole or in part the primary reason for home health admission.    Date of encounter:   Patient:                    MRN:                       YOB: 2020  Priya Callahan  [id number]  1950     Home health to see patient for:  Home health aide    Skilled need for:  Exacerbation of Chronic Disease State COPD    Skilled nursing interventions to include:  Comment: medication management, vitals. COPD exacerbation     Homebound evidenced status by:  Have a condition such that leaving his or her home is medically  contraindicated. Leaving home must require a considerable and taxing effort. There must exist a normal inability to leave the home.    Community Physician to provide follow up care: Kavitha Mccall M.D.     Optional Interventions    Wound information & treatment:    Home Infusion Therapy orders:    Line/Drain/Airway:    I certify the face to face encounter for this home care referral meets the CMS requirements and the encounter/clinical assessment with the patient was, in whole, or in part, for the medical condition(s) listed above, which is the primary reason for home health care. Based on my clinical findings: the service(s) are medically necessary, support the need for home health care, and the homebound criteria are met.  I certify that this patient has had a face to face encounter by myself.  Tasha Dewey M.D. - NPI: [id number]    *Debility, frailty and advanced age in the absence of an acute deterioration or exacerbation of a condition do not qualify a patient for home health.

## 2019-06-19 NOTE — DISCHARGE PLANNING
Anticipated Discharge Disposition: Assisted Living Facility in [place] with O2    Action: Order for Home O2 received. Spoke with the patient at the bedside and she chose [facility] for her O2 provider.  Choice form faxed to Mable CELESTIN at [phone].    Barriers to Discharge: medical clearance    Plan: as stated above.

## 2019-06-19 NOTE — PROGRESS NOTES
Received report from night shift RN Jacki. Assumed care of patient. Patient is SR on the monitor at this time. Patient is sitting up in bed with no family present at bedside a this time. Patient declines any needs at this time. Plan of care discussed with patient. Bed locked and in the lowest position with call light within reach.

## 2019-06-19 NOTE — PROGRESS NOTES
Assumed care at 1900. Received report from SHITAL Lpoez. First assessment completed, call light within reach. Fall precautions within place. Will continue to monitor.

## 2019-06-19 NOTE — PROGRESS NOTES
2 RN skin check completed with SHITAL Munoz.   Devices in place: [facility], [place].  Skin assessed under devices: yes.  Confirmed pressure ulcers found on: none.  New potential pressure ulcers noted on: none. Wound consult placed: no.  The following interventions in place: waffle mattress, extra pillows for support, clean and dry linen on hospital bed, moisturizer, pt turns and repositions independently, interdrys.     Bilat ears pink, intact, blanching.  Bilat elbows intact and blanching.  Generalized bruising to bilat UE.  Moist, pink, blanching under bilat breastfolds.   Moisture fissure noted mid pannus. Pannus pink, moist, blanching.  Sacrum intact, pink and blanching.   Bilat lower extremities pink/red, intact, blanching, edematous.  Heels bilat intact and blanching.

## 2019-06-19 NOTE — PROGRESS NOTES
Hospital Medicine Daily Progress Note    Date of Service  6/19/2019    Chief Complaint  68 y.o. female admitted 6/14/2019 with shortness of breath     Hospital Course    68-year-old female past medical history morbid obesity, EDITH on CPAP machine, COPD on chronic 4 L of oxygen, diabetes mellitus, hypertension was admitted 6/14/2018 for COPD exacerbation and possible hospital-acquired pneumonia. She has been treated 6 times with antibiotic last 6 months. Last admission 06/03/2019. She was hypoxic on admission. CXR showed diffuse interstitial prominence and bibasilar opacity. She was started on IV ceftriaxone and doxy, methyl prednisone. Pro-calcitonin was normal.  On admission was noted downtrending of hemoglobin and occult blood was positive.  1 unit of PRBC was given.  Gastroenterologist was consulted and did recommend outpatient endoscopy. She has chronic kidney disease stage III. On admission she did have acute on chronic kidney injury which did improve to her baseline. Troponin and BNP was slight elevated. Echocardiogram was done and showed grade II diastolic dysfunction, moderate mitral stenosis, RSVP prior 60. Lasix was given and pt noticed some improvement on her leg swelling.        Interval Problem Update  She is feeling better. She is feeling cough is loosen. Breathing is almost her baseline. Still coughing. She is concerned for review of CXR because she has been treated 6 times last 6 months   06/19-feeling better, less swollen. She was requiring 10L of Oxygen on ambulation. Continue to monitor. Afebrile      Consultants/Specialty  Gastroenterologist   Nephrologist     Code Status  Full code     Disposition  Inpatient     Review of Systems  Review of Systems   Constitutional: Negative for chills and fever.   HENT: Positive for congestion. Negative for sore throat.    Respiratory: Positive for cough, sputum production and wheezing.    Cardiovascular: Positive for leg swelling. Negative for chest pain and  palpitations.   Gastrointestinal: Negative for abdominal pain, heartburn, nausea and vomiting.   Genitourinary: Negative for dysuria.   Musculoskeletal: Positive for back pain.   Skin: Negative for rash.   Neurological: Negative for dizziness.        Physical Exam  Temp:  [36.3 °C (97.4 °F)-36.7 °C (98 °F)] 36.5 °C (97.7 °F)  Pulse:  [65-86] 68  Resp:  [16-20] 20  BP: (126-159)/(54-75) 126/75  SpO2:  [90 %-98 %] 90 %    Physical Exam   Constitutional: She is oriented to person, place, and time. She appears well-developed. No distress.   HENT:   Head: Normocephalic and atraumatic.   Neck: Normal range of motion.   Cardiovascular: Normal rate and regular rhythm.    Murmur heard.   Decrescendo systolic murmur is present with a grade of 3/6   Pulmonary/Chest: Effort normal. No respiratory distress. She has no wheezes. She has rales.   Abdominal: Soft. Bowel sounds are normal. She exhibits no distension. There is no tenderness.   Musculoskeletal: Normal range of motion. She exhibits edema.   Neurological: She is alert and oriented to person, place, and time. No cranial nerve deficit.   Skin: Skin is warm.       Fluids    Intake/Output Summary (Last 24 hours) at 06/19/19 1544  Last data filed at 06/19/19 0536   Gross per 24 hour   Intake              400 ml   Output                0 ml   Net              400 ml       Laboratory  Recent Labs      06/17/19   0101  06/18/19   0234  06/19/19   0207   WBC  11.7*  21.3*  21.1*   RBC  2.86*  2.80*  2.59*   HEMOGLOBIN  8.0*  8.0*  7.2*   HEMATOCRIT  26.3*  26.4*  24.0*   MCV  92.0  94.3  92.7   MCH  28.0  28.6  27.8   MCHC  30.4*  30.3*  30.0*   RDW  54.4*  56.8*  56.9*   PLATELETCT  223  229  217   MPV  9.5  9.3  8.9*     Recent Labs      06/17/19   0101  06/18/19   0234  06/19/19   0207   SODIUM  134*  133*  138   POTASSIUM  4.9  5.0  4.9   CHLORIDE  99  99  102   CO2  26  26  28   GLUCOSE  393*  324*  313*   BUN  35*  45*  49*   CREATININE  2.01*  1.96*  1.96*   CALCIUM  8.9   9.1  8.9                   Imaging  DX-CHEST-PORTABLE (1 VIEW)   Final Result         1.  Pulmonary edema and/or infiltrates are identified, which are stable since the prior exam.   2.  Cardiomegaly      US-RENAL   Final Result      No hydronephrosis is seen.      DX-CHEST-PORTABLE (1 VIEW)   Final Result         Diffuse interstitial prominence could relate to mild pulmonary edema or atypical infection.      Patchy bibasilar opacities, likely atelectasis.      Stable cardiomegaly.           Assessment/Plan  HCAP (healthcare-associated pneumonia)- (present on admission)   Assessment & Plan     Rocephin (06/15-06/18),  Doxycycline (06/15-06/22). I started Augmentin (end date 06/22)  (allergic to levofloxacin) . Pt is difficult to have IV access. Sputum culture are positive for gram positive rods/ gram positive cocci. Recent hospitalization 06/03.   She is on chronic oxygen L at home. Close to her baseline  Continue RT protocol  Repeat CXR p06/19, no much difference    Leukocytosis is possible due to steroid use . Continue to monitor        Atrial fibrillation (HCC)- (present on admission)   Assessment & Plan    She is not on anticoagulation. Sinus rhythm today   Continue diltiazem  RODOLFO-VASC score 5. No need for bridge at this time . Follow up with PCP      HAN (acute kidney injury) (HCC)- (present on admission)   Assessment & Plan    Seems pt has CKD stage III. Worse on admission due to Pneumonia ?? D/w  nephrology   renal ultrasound showed:No hydronephrosis is seen.  Improving back to her baseline. Continue to monitor   Avoid nephrotoxic medication. Continue to monitor        Acute on chronic respiratory failure with hypoxia (HCC)- (present on admission)   Assessment & Plan    Secondary to HCAP /COPD exacerbation   Echocardiogram showed diastolic dysfunction stage II, but no signs of CHF exacerbation   Almost back to her baseline        COPD (chronic obstructive pulmonary disease) (HCC)- (present on admission)    Assessment & Plan    Will monitor  Acute on chronic with acute resp failure  Continue Anoro Ellipta  resp protocol  Switch to prednisone 40 mg daily (for 5 days)        DM type 2, uncontrolled, with renal complications (HCC)- (present on admission)   Assessment & Plan    Not well controlled due to steroid use   Increased lantus 30 Units   S.s.insulin  A1c 6.7   accu checks are noted         Anemia- (present on admission)   Assessment & Plan    Occult blood positive  Iron deficient  No sign of bleed  b12 and folic normal  Transfuse iv iron given   Started on iron PO supplement   Follow up with GI        Chronic diastolic congestive heart failure (HCC)- (present on admission)   Assessment & Plan    Not in acute failure  Will monitor  Resume lasix as her home medications      Class 3 severe obesity due to excess calories with serious comorbidity and body mass index (BMI) of 40.0 to 44.9 in adult (HCC)- (present on admission)   Assessment & Plan    Body mass index is 40.27 kg/m².       Essential hypertension- (present on admission)   Assessment & Plan    Not controlled  Resumed cardizam cd  Hydralazine iv prn          VTE prophylaxis: SCD (positive occult blood)

## 2019-06-19 NOTE — PROGRESS NOTES
IV Iron Per Pharmacy Note    Patient Lean Body Weight:  57 kg  Reason for Iron Replacement: Iron Deficiency       Lab Results   Component Value Date/Time    WBC 21.1 (H) 06/19/2019 02:07 AM    RBC 2.59 (L) 06/19/2019 02:07 AM    HEMOGLOBIN 7.2 (L) 06/19/2019 02:07 AM    HEMATOCRIT 24.0 (L) 06/19/2019 02:07 AM    MCV 92.7 06/19/2019 02:07 AM    MCH 27.8 06/19/2019 02:07 AM    MCHC 30.0 (L) 06/19/2019 02:07 AM    MPV 8.9 (L) 06/19/2019 02:07 AM               Recent Labs      06/19/19   0207   CREATININE  1.96*          Assessment/Plan:  1. IV Iron Indicated.   2. Give Iron Dextran 25 mg IV test dose following diphenhydramine/acetaminophen premeds over 30 minutes per protocol.  3. If no reaction (Anaphylaxis, Hypotension/Hypertension, N/V/D, Chest pain/Back Pain, Urticaria/Pruritis) in the next hour, proceed to full dose. Nursing to call the pharmacy IV room at ext. [phone] for full dose.  4. Full dose: Iron Dextran 1500 mg IV over 4 hours. Continue to monitor for delayed ADR including: Arthralgia/myalgia, Headache/backache, chills/dizziness/malaise, moderate to high fever and n/v.      Sumi Zavala, PharmD

## 2019-06-19 NOTE — DISCHARGE PLANNING
Received Choice form at 1600  Agency/Facility Name: [facility]  Referral sent per Choice form at 1600

## 2019-06-20 ENCOUNTER — PATIENT OUTREACH (OUTPATIENT)
Dept: HEALTH INFORMATION MANAGEMENT | Facility: OTHER | Age: 69
End: 2019-06-20

## 2019-06-20 PROBLEM — J18.9 HCAP (HEALTHCARE-ASSOCIATED PNEUMONIA): Status: RESOLVED | Noted: 2017-04-04 | Resolved: 2019-06-20

## 2019-06-20 PROBLEM — N17.9 AKI (ACUTE KIDNEY INJURY) (HCC): Status: RESOLVED | Noted: 2017-04-04 | Resolved: 2019-06-20

## 2019-06-20 LAB
ALBUMIN SERPL BCP-MCNC: 3.1 G/DL (ref 3.2–4.9)
ALBUMIN/GLOB SERPL: 1.4 G/DL
ALP SERPL-CCNC: 40 U/L (ref 30–99)
ALT SERPL-CCNC: 35 U/L (ref 2–50)
ANION GAP SERPL CALC-SCNC: 8 MMOL/L (ref 0–11.9)
ANISOCYTOSIS BLD QL SMEAR: ABNORMAL
AST SERPL-CCNC: 22 U/L (ref 12–45)
BACTERIA BLD CULT: NORMAL
BASOPHILS # BLD AUTO: 0.1 % (ref 0–1.8)
BASOPHILS # BLD: 0.02 K/UL (ref 0–0.12)
BILIRUB SERPL-MCNC: 0.4 MG/DL (ref 0.1–1.5)
BUN SERPL-MCNC: 52 MG/DL (ref 8–22)
CALCIUM SERPL-MCNC: 8.7 MG/DL (ref 8.5–10.5)
CHLORIDE SERPL-SCNC: 103 MMOL/L (ref 96–112)
CO2 SERPL-SCNC: 28 MMOL/L (ref 20–33)
COMMENT 1642: NORMAL
CREAT SERPL-MCNC: 2.05 MG/DL (ref 0.5–1.4)
EOSINOPHIL # BLD AUTO: 0.03 K/UL (ref 0–0.51)
EOSINOPHIL NFR BLD: 0.2 % (ref 0–6.9)
ERYTHROCYTE [DISTWIDTH] IN BLOOD BY AUTOMATED COUNT: 57.2 FL (ref 35.9–50)
GLOBULIN SER CALC-MCNC: 2.2 G/DL (ref 1.9–3.5)
GLUCOSE BLD-MCNC: 216 MG/DL (ref 65–99)
GLUCOSE SERPL-MCNC: 169 MG/DL (ref 65–99)
HCT VFR BLD AUTO: 25.1 % (ref 37–47)
HGB BLD-MCNC: 7.4 G/DL (ref 12–16)
IMM GRANULOCYTES # BLD AUTO: 0.38 K/UL (ref 0–0.11)
IMM GRANULOCYTES NFR BLD AUTO: 2.1 % (ref 0–0.9)
LYMPHOCYTES # BLD AUTO: 1.5 K/UL (ref 1–4.8)
LYMPHOCYTES NFR BLD: 8.4 % (ref 22–41)
MCH RBC QN AUTO: 27.5 PG (ref 27–33)
MCHC RBC AUTO-ENTMCNC: 29.5 G/DL (ref 33.6–35)
MCV RBC AUTO: 93.3 FL (ref 81.4–97.8)
MICROCYTES BLD QL SMEAR: ABNORMAL
MONOCYTES # BLD AUTO: 1.32 K/UL (ref 0–0.85)
MONOCYTES NFR BLD AUTO: 7.4 % (ref 0–13.4)
MORPHOLOGY BLD-IMP: NORMAL
NEUTROPHILS # BLD AUTO: 14.53 K/UL (ref 2–7.15)
NEUTROPHILS NFR BLD: 81.8 % (ref 44–72)
NRBC # BLD AUTO: 0 K/UL
NRBC BLD-RTO: 0 /100 WBC
OVALOCYTES BLD QL SMEAR: NORMAL
PLATELET # BLD AUTO: 220 K/UL (ref 164–446)
PLATELET BLD QL SMEAR: NORMAL
PMV BLD AUTO: 9.1 FL (ref 9–12.9)
POIKILOCYTOSIS BLD QL SMEAR: NORMAL
POLYCHROMASIA BLD QL SMEAR: NORMAL
POTASSIUM SERPL-SCNC: 4.9 MMOL/L (ref 3.6–5.5)
PROT SERPL-MCNC: 5.3 G/DL (ref 6–8.2)
RBC # BLD AUTO: 2.69 M/UL (ref 4.2–5.4)
RBC BLD AUTO: PRESENT
SIGNIFICANT IND 70042: NORMAL
SITE SITE: NORMAL
SODIUM SERPL-SCNC: 139 MMOL/L (ref 135–145)
SOURCE SOURCE: NORMAL
WBC # BLD AUTO: 17.8 K/UL (ref 4.8–10.8)

## 2019-06-20 PROCEDURE — 700102 HCHG RX REV CODE 250 W/ 637 OVERRIDE(OP): Performed by: INTERNAL MEDICINE

## 2019-06-20 PROCEDURE — A9270 NON-COVERED ITEM OR SERVICE: HCPCS | Performed by: INTERNAL MEDICINE

## 2019-06-20 PROCEDURE — 36415 COLL VENOUS BLD VENIPUNCTURE: CPT

## 2019-06-20 PROCEDURE — 85025 COMPLETE CBC W/AUTO DIFF WBC: CPT

## 2019-06-20 PROCEDURE — 770020 HCHG ROOM/CARE - TELE (206)

## 2019-06-20 PROCEDURE — 700102 HCHG RX REV CODE 250 W/ 637 OVERRIDE(OP): Performed by: FAMILY MEDICINE

## 2019-06-20 PROCEDURE — 80053 COMPREHEN METABOLIC PANEL: CPT

## 2019-06-20 PROCEDURE — A9270 NON-COVERED ITEM OR SERVICE: HCPCS | Performed by: FAMILY MEDICINE

## 2019-06-20 PROCEDURE — 99239 HOSP IP/OBS DSCHRG MGMT >30: CPT | Performed by: INTERNAL MEDICINE

## 2019-06-20 PROCEDURE — 700111 HCHG RX REV CODE 636 W/ 250 OVERRIDE (IP): Performed by: INTERNAL MEDICINE

## 2019-06-20 PROCEDURE — 82962 GLUCOSE BLOOD TEST: CPT

## 2019-06-20 RX ORDER — FERROUS GLUCONATE 324(38)MG
324 TABLET ORAL
Qty: 30 TAB | Refills: 1 | Status: SHIPPED | OUTPATIENT
Start: 2019-06-21 | End: 2019-08-15 | Stop reason: SDUPTHER

## 2019-06-20 RX ORDER — AMOXICILLIN AND CLAVULANATE POTASSIUM 875; 125 MG/1; MG/1
1 TABLET, FILM COATED ORAL EVERY 12 HOURS
Qty: 4 TAB | Refills: 0 | Status: SHIPPED | OUTPATIENT
Start: 2019-06-20 | End: 2019-06-26

## 2019-06-20 RX ORDER — DOXYCYCLINE 100 MG/1
100 TABLET ORAL EVERY 12 HOURS
Qty: 4 TAB | Refills: 0 | Status: SHIPPED | OUTPATIENT
Start: 2019-06-20 | End: 2019-06-26

## 2019-06-20 RX ORDER — POTASSIUM CHLORIDE 20 MEQ/1
20 TABLET, EXTENDED RELEASE ORAL DAILY
Qty: 60 TAB | Refills: 1 | Status: SHIPPED
Start: 2019-06-21 | End: 2019-12-17

## 2019-06-20 RX ORDER — IPRATROPIUM BROMIDE AND ALBUTEROL SULFATE 2.5; .5 MG/3ML; MG/3ML
3 SOLUTION RESPIRATORY (INHALATION) EVERY 6 HOURS PRN
Qty: 30 BULLET | Refills: 0 | Status: SHIPPED | OUTPATIENT
Start: 2019-06-20

## 2019-06-20 RX ORDER — PREDNISONE 20 MG/1
TABLET ORAL
Qty: 4 TAB | Refills: 0 | Status: SHIPPED | OUTPATIENT
Start: 2019-06-21 | End: 2019-06-26

## 2019-06-20 RX ADMIN — AMITRIPTYLINE HYDROCHLORIDE 50 MG: 25 TABLET, FILM COATED ORAL at 21:06

## 2019-06-20 RX ADMIN — FUROSEMIDE 40 MG: 40 TABLET ORAL at 15:01

## 2019-06-20 RX ADMIN — OXYCODONE HYDROCHLORIDE 5 MG: 5 TABLET ORAL at 21:22

## 2019-06-20 RX ADMIN — LEVOTHYROXINE SODIUM 75 MCG: 75 TABLET ORAL at 05:12

## 2019-06-20 RX ADMIN — TRAZODONE HYDROCHLORIDE 150 MG: 50 TABLET ORAL at 21:06

## 2019-06-20 RX ADMIN — OXYCODONE HYDROCHLORIDE 5 MG: 5 TABLET ORAL at 15:01

## 2019-06-20 RX ADMIN — DOXYCYCLINE 100 MG: 100 TABLET, FILM COATED ORAL at 17:31

## 2019-06-20 RX ADMIN — NYSTATIN: 100000 POWDER TOPICAL at 05:13

## 2019-06-20 RX ADMIN — NYSTATIN: 100000 POWDER TOPICAL at 17:32

## 2019-06-20 RX ADMIN — DILTIAZEM HYDROCHLORIDE 120 MG: 120 CAPSULE, COATED, EXTENDED RELEASE ORAL at 05:12

## 2019-06-20 RX ADMIN — CYCLOBENZAPRINE HYDROCHLORIDE 5 MG: 10 TABLET, FILM COATED ORAL at 22:52

## 2019-06-20 RX ADMIN — AMOXICILLIN AND CLAVULANATE POTASSIUM 1 TABLET: 875; 125 TABLET, FILM COATED ORAL at 05:12

## 2019-06-20 RX ADMIN — ROSUVASTATIN CALCIUM 10 MG: 10 TABLET, FILM COATED ORAL at 05:12

## 2019-06-20 RX ADMIN — DULOXETINE HYDROCHLORIDE 60 MG: 60 CAPSULE, DELAYED RELEASE ORAL at 05:11

## 2019-06-20 RX ADMIN — FERROUS GLUCONATE 324 MG: 324 TABLET ORAL at 05:11

## 2019-06-20 RX ADMIN — DOXYCYCLINE 100 MG: 100 TABLET, FILM COATED ORAL at 05:12

## 2019-06-20 RX ADMIN — OMEPRAZOLE 20 MG: 20 CAPSULE, DELAYED RELEASE ORAL at 17:31

## 2019-06-20 RX ADMIN — PREDNISONE 40 MG: 20 TABLET ORAL at 05:12

## 2019-06-20 RX ADMIN — INSULIN GLARGINE 30 UNITS: 100 INJECTION, SOLUTION SUBCUTANEOUS at 21:06

## 2019-06-20 RX ADMIN — FUROSEMIDE 40 MG: 40 TABLET ORAL at 05:12

## 2019-06-20 RX ADMIN — POTASSIUM CHLORIDE 20 MEQ: 20 TABLET, EXTENDED RELEASE ORAL at 05:12

## 2019-06-20 RX ADMIN — AMOXICILLIN AND CLAVULANATE POTASSIUM 1 TABLET: 875; 125 TABLET, FILM COATED ORAL at 17:31

## 2019-06-20 RX ADMIN — OXYCODONE HYDROCHLORIDE 5 MG: 5 TABLET ORAL at 03:51

## 2019-06-20 RX ADMIN — OMEPRAZOLE 20 MG: 20 CAPSULE, DELAYED RELEASE ORAL at 05:12

## 2019-06-20 RX ADMIN — FLUTICASONE PROPIONATE 88 MCG: 44 AEROSOL, METERED RESPIRATORY (INHALATION) at 09:14

## 2019-06-20 ASSESSMENT — PATIENT HEALTH QUESTIONNAIRE - PHQ9
1. LITTLE INTEREST OR PLEASURE IN DOING THINGS: NOT AT ALL
SUM OF ALL RESPONSES TO PHQ9 QUESTIONS 1 AND 2: 0
2. FEELING DOWN, DEPRESSED, IRRITABLE, OR HOPELESS: NOT AT ALL

## 2019-06-20 NOTE — PROGRESS NOTES
2 RN skin check completed with Garret ISAACS.   Devices in place oxygen.  Skin assessed under devices yes.    Bilat ears pink/blanching - grey foam in place  Bilat elbows pink/blanching  Generalized bruising to bilat UE.  Moist/pink/blancing under bilat breasts - nystatin powder and interdrys in place  Moisture fissure noted mid pannus. Pannus pink/blanching/moist - nystatin powder and interdrys in place  Sacrum pink/blanching  Bilat lower extremities pink/red/swollen/warm  Heels bilat intact and blanching  Bilat feet +2 pitting edema

## 2019-06-20 NOTE — FACE TO FACE
"Face to Face Note  -  Durable Medical Equipment    Tasha Dewey M.D. - NPI: [id number]  I certify that this patient is under my care and that they had a durable medical equipment(DME)face to face encounter by myself that meets the physician DME face-to-face encounter requirements with this patient on:    Date of encounter:   Patient:                    MRN:                       YOB: 2020  Priya Callahan  [id number]  1950     The encounter with the patient was in whole, or in part, for the following medical condition, which is the primary reason for durable medical equipment:  COPD    I certify that, based on my findings, the following durable medical equipment is medically necessary:  Oxygen.    HOME O2 Saturation Measurements:(Values must be present for Home Oxygen orders)  Room air sat at rest: 87  Room air sat with amb: 84  With liters of O2: 4, O2 sat at rest with O2: 94  With Liters of O2: 6, O2 sat with amb with O2 : 91  Is the patient mobile?: Yes    My Clinical findings support the need for the above equipment due to:  Hypoxia    Supporting Symptoms: The patient requires supplemental oxygen, as the following interventions have been tried with limited or no improvement: \"Bronchodilators and/or steroid inhalers, \"Oral and/or IV steroids and \"Incentive spirometry    "

## 2019-06-20 NOTE — DISCHARGE PLANNING
LSW informed pt's friend unable to provide ride like previously expected. LSW contacted bedside RN and requested she speak to pt regarding privately paying for cab. Pt stated she can not afford it. LSW contacted [facility]. jail has transportation but is unable to provide transportation after 3pm.     Update: LSW completed chart review. Pt received [employer] transportation last discharge on 6/8/19. LSW called bedside RN to discuss. Per bedside RN, pt has been stating all day that her friend can only provide transportation later in the day until about 3pm when pt stated her friend can not provide transportation at all.     LSW contacted leadership and discussed above. Leadership requested transportation be set up for pt with [facility] for tomorrow.    LSW contacted [facility] and provided Unit CM number. Someone with [facility] will call [phone] on 6/21/19 to set up transportation. LSW updated Unit CM via email.

## 2019-06-20 NOTE — DISCHARGE INSTRUCTIONS
Shortness of Breath  Shortness of breath means you have trouble breathing. Shortness of breath needs medical care right away.  HOME CARE   · Do not smoke.  · Avoid being around chemicals or things (paint fumes, dust) that may bother your breathing.  · Rest as needed. Slowly begin your normal activities.  · Only take medicines as told by your doctor.  · Keep all doctor visits as told.  GET HELP RIGHT AWAY IF:   · Your shortness of breath gets worse.  · You feel lightheaded, pass out (faint), or have a cough that is not helped by medicine.  · You cough up blood.  · You have pain with breathing.  · You have pain in your chest, arms, shoulders, or belly (abdomen).  · You have a fever.  · You cannot walk up stairs or exercise the way you normally do.  · You do not get better in the time expected.  · You have a hard time doing normal activities even with rest.  · You have problems with your medicines.  · You have any new symptoms.  MAKE SURE YOU:  · Understand these instructions.  · Will watch your condition.  · Will get help right away if you are not doing well or get worse.  This information is not intended to replace advice given to you by your health care provider. Make sure you discuss any questions you have with your health care provider.  Document Released: 06/05/2009 Document Revised: 12/23/2014 Document Reviewed: 03/04/2013  [employer] Interactive Patient Education © 2017 [employer]      Pneumonitis  Pneumonitis is inflammation of the lungs.   CAUSES   Many things can cause pneumonitis. These can include:   · A bacterial or viral infection. Pneumonitis due to an infection is usually called pneumonia.  · Work-related exposures, including farm and industrial work. Some substances that can cause pneumonitis include asbestos, silica, inhaled acids, or inhaled chlorine gas.    · Repeated exposure to bird feathers, bird feces, or other allergens.    · Medicine such as chemotherapy drugs, certain antibiotics, and some  heart medicines.    · Radiation therapy.    · Exposure to mold. A hot tub, sauna, or home humidifier can have mold growing in it, even if it looks clean. The mold can be breathed in through water vapor.  · Breathing (aspirating) stomach contents, food, or liquids into the lungs.    SIGNS AND SYMPTOMS   · Cough.    · Shortness of breath or difficulty breathing.    · Fever.    · Decreased energy.    · Decreased appetite.    DIAGNOSIS   To diagnose pneumonitis, your health care provider will do a complete history and physical exam. Various tests may be ordered, such as:   · Pulmonary function test.    · Chest X-ray.    · CT scan of the lungs.    · Bronchoscopy.    · Lung biopsy.    TREATMENT   Treatment will depend on the cause of the pneumonitis. If the cause is exposure to a substance, avoiding further exposure to that substance will help reduce your symptoms. Possible medical treatments for pneumonitis include:   · Corticosteroid medicine to help decrease inflammation in the lungs.    · Antibiotic medicine to help fight a bacterial lung infection.    · Oxygen therapy if you are having difficulty breathing.    HOME CARE INSTRUCTIONS   · Avoid exposure to any substance identified as the cause of your pneumonitis.    · If you must continue to work with substances that can cause pneumonitis, wear a mask to protect your lungs.    · Only take over-the-counter or prescription medicine as directed by your health care provider.    · Do not smoke.    · If you use inhalers, keep them with you at all times.    · Follow up with your health care provider as directed.    SEEK IMMEDIATE MEDICAL CARE IF:   · You develop new or increased shortness of breath.    · You develop a blue color (cyanosis) under your fingernails.    · You have a fever.    MAKE SURE YOU:   · Understand these instructions.  · Will watch your condition.  · Will get help right away if you are not doing well or get worse.  This information is not intended to  replace advice given to you by your health care provider. Make sure you discuss any questions you have with your health care provider.  Document Released: 06/07/2011 Document Revised: 08/20/2014 Document Reviewed: 06/09/2014  [employer] Interactive Patient Education © 2017 [employer]    Albuterol inhalation aerosol  What is this medicine?  ALBUTEROL (al BYOO ter ole) is a bronchodilator. It helps open up the airways in your lungs to make it easier to breathe. This medicine is used to treat and to prevent bronchospasm.  This medicine may be used for other purposes; ask your health care provider or pharmacist if you have questions.  COMMON BRAND NAME(S): Proair HFA, Proventil, Proventil HFA, Respirol, Ventolin, Ventolin HFA  What should I tell my health care provider before I take this medicine?  They need to know if you have any of the following conditions:  -diabetes  -heart disease or irregular heartbeat  -high blood pressure  -pheochromocytoma  -seizures  -thyroid disease  -an unusual or allergic reaction to albuterol, levalbuterol, sulfites, other medicines, foods, dyes, or preservatives  -pregnant or trying to get pregnant  -breast-feeding  How should I use this medicine?  This medicine is for inhalation through the mouth. Follow the directions on your prescription label. Take your medicine at regular intervals. Do not use more often than directed. Make sure that you are using your inhaler correctly. Ask you doctor or health care provider if you have any questions.  Talk to your pediatrician regarding the use of this medicine in children. Special care may be needed.  Overdosage: If you think you have taken too much of this medicine contact a poison control center or emergency room at once.  NOTE: This medicine is only for you. Do not share this medicine with others.  What if I miss a dose?  If you miss a dose, use it as soon as you can. If it is almost time for your next dose, use only that dose. Do not use  double or extra doses.  What may interact with this medicine?  -anti-infectives like chloroquine and pentamidine  -caffeine  -cisapride  -diuretics  -medicines for colds  -medicines for depression or for emotional or psychotic conditions  -medicines for weight loss including some herbal products  -methadone  -some antibiotics like clarithromycin, erythromycin, levofloxacin, and linezolid  -some heart medicines  -steroid hormones like dexamethasone, cortisone, hydrocortisone  -theophylline  -thyroid hormones  This list may not describe all possible interactions. Give your health care provider a list of all the medicines, herbs, non-prescription drugs, or dietary supplements you use. Also tell them if you smoke, drink alcohol, or use illegal drugs. Some items may interact with your medicine.  What should I watch for while using this medicine?  Tell your doctor or health care professional if your symptoms do not improve. Do not use extra albuterol. If your asthma or bronchitis gets worse while you are using this medicine, call your doctor right away.  If your mouth gets dry try chewing sugarless gum or sucking hard candy. Drink water as directed.  What side effects may I notice from receiving this medicine?  Side effects that you should report to your doctor or health care professional as soon as possible:  -allergic reactions like skin rash, itching or hives, swelling of the face, lips, or tongue  -breathing problems  -chest pain  -feeling faint or lightheaded, falls  -high blood pressure  -irregular heartbeat  -fever  -muscle cramps or weakness  -pain, tingling, numbness in the hands or feet  -vomiting  Side effects that usually do not require medical attention (report to your doctor or health care professional if they continue or are bothersome):  -cough  -difficulty sleeping  -headache  -nervousness or trembling  -stomach upset  -stuffy or runny nose  -throat irritation  -unusual taste  This list may not describe  all possible side effects. Call your doctor for medical advice about side effects. You may report side effects to FDA at 3-044-TOF-2609.  Where should I keep my medicine?  Keep out of the reach of children.  Store at room temperature between 15 and 30 degrees C (59 and 86 degrees F). The contents are under pressure and may burst when exposed to heat or flame. Do not freeze. This medicine does not work as well if it is too cold. Throw away any unused medicine after the expiration date. Inhalers need to be thrown away after the labeled number of puffs have been used or by the expiration date; whichever comes first. Ventolin HFA should be thrown away 12 months after removing from foil pouch. Check the instructions that come with your medicine.  NOTE: This sheet is a summary. It may not cover all possible information. If you have questions about this medicine, talk to your doctor, pharmacist, or health care provider.  © 2018 Elsevier/Gold Standard (2014-06-05 10:57:17)        Discharge Instructions    Discharged to home by taxi with self. Discharged via wheelchair, hospital escort: Yes.  Special equipment needed: Oxygen    Be sure to schedule a follow-up appointment with your primary care doctor or any specialists as instructed.     Discharge Plan:   Diet Plan: Discussed  Activity Level: Discussed  Confirmed Follow up Appointment: Patient to Call and Schedule Appointment  Confirmed Symptoms Management: Discussed  Medication Reconciliation Updated: Yes  Influenza Vaccine Indication: Not indicated: Previously immunized this influenza season and > 8 years of age    I understand that a diet low in cholesterol, fat, and sodium is recommended for good health. Unless I have been given specific instructions below for another diet, I accept this instruction as my diet prescription.   Other diet: Regular      Special Instructions: None    · Is patient discharged on Warfarin / Coumadin?   No     Depression / Suicide Risk    As you  are discharged from this [facility] facility, it is important to learn how to keep safe from harming yourself.    Recognize the warning signs:  · Abrupt changes in personality, positive or negative- including increase in energy   · Giving away possessions  · Change in eating patterns- significant weight changes-  positive or negative  · Change in sleeping patterns- unable to sleep or sleeping all the time   · Unwillingness or inability to communicate  · Depression  · Unusual sadness, discouragement and loneliness  · Talk of wanting to die  · Neglect of personal appearance   · Rebelliousness- reckless behavior  · Withdrawal from people/activities they love  · Confusion- inability to concentrate     If you or a loved one observes any of these behaviors or has concerns about self-harm, here's what you can do:  · Talk about it- your feelings and reasons for harming yourself  · Remove any means that you might use to hurt yourself (examples: pills, rope, extension cords, firearm)  · Get professional help from the community (Mental Health, Substance Abuse, psychological counseling)  · Do not be alone:Call your Safe Contact- someone whom you trust who will be there for you.  · Call your local CRISIS HOTLINE 613-2884 or 992-165-4254  · Call your local Children's Mobile Crisis Response Team [place] (386) 730-7194 or www.[employer]  · Call the toll free National Suicide Prevention Hotlines   · National Suicide Prevention Lifeline 929-176-XXLM (0091)  · National Hope Line Network 800-SUICIDE (958-2649)

## 2019-06-20 NOTE — DISCHARGE PLANNING
Agency/Facility Name: [facility]  Outcome: When  Doing follow up found that [facility] never sent referral, had to send manually.

## 2019-06-20 NOTE — DISCHARGE PLANNING
Received Choice form at 0972  Agency/Facility Name: [facility]  Referral sent per Choice form @ [id number]

## 2019-06-20 NOTE — PROGRESS NOTES
2 RN skin check completed with SHITAL Piedra.   Devices in place: PIV, NC, SCDs.  Skin assessed under devices: yes.  Confirmed pressure ulcers found on: none.   New potential pressure ulcers noted on: none. Wound consult placed: no.       Bilat ears pink, intact, blanching.  Bilat elbows intact and blanching.  Scattered bruising noted to bilat UE.  Moist, pink, blanching under bilat breastfolds.   Moisture fissure noted mid pannus. Pannus pink, moist, blanching.  Sacrum intact, pink and blanching.   Bilat lower extremities pink/red, intact, blanching, edematous.  Heels bilat intact and blanching, dry.      The following interventions in place: waffle mattress, extra pillows for support, clean and dry linen on hospital bed, moisturizer, pt turns and repositions independently, interdrys, nystatin.

## 2019-06-20 NOTE — DISCHARGE PLANNING
Agency/Facility Name: [facility]  Spoke To: Key  Outcome: Patient accepted and o2 will be delivered to bedside shortly.

## 2019-06-20 NOTE — DISCHARGE PLANNING
LSW received call from unit CM requested follow up on O2. LSW contacted [facility]. Order put in for delivery about an hour ago and should be delivered within the hour per Key.    Update: LSW contacted unit and confirmed O2 was delivered to bedside.

## 2019-06-21 VITALS
DIASTOLIC BLOOD PRESSURE: 57 MMHG | RESPIRATION RATE: 16 BRPM | HEIGHT: 65 IN | WEIGHT: 242.73 LBS | SYSTOLIC BLOOD PRESSURE: 128 MMHG | TEMPERATURE: 97.1 F | HEART RATE: 76 BPM | BODY MASS INDEX: 40.44 KG/M2 | OXYGEN SATURATION: 94 %

## 2019-06-21 LAB
BACTERIA BLD CULT: NORMAL
BACTERIA BLD CULT: NORMAL
SIGNIFICANT IND 70042: NORMAL
SIGNIFICANT IND 70042: NORMAL
SITE SITE: NORMAL
SITE SITE: NORMAL
SOURCE SOURCE: NORMAL
SOURCE SOURCE: NORMAL

## 2019-06-21 PROCEDURE — 700102 HCHG RX REV CODE 250 W/ 637 OVERRIDE(OP): Performed by: INTERNAL MEDICINE

## 2019-06-21 PROCEDURE — 700111 HCHG RX REV CODE 636 W/ 250 OVERRIDE (IP): Performed by: INTERNAL MEDICINE

## 2019-06-21 PROCEDURE — 700102 HCHG RX REV CODE 250 W/ 637 OVERRIDE(OP): Performed by: FAMILY MEDICINE

## 2019-06-21 PROCEDURE — A9270 NON-COVERED ITEM OR SERVICE: HCPCS | Performed by: INTERNAL MEDICINE

## 2019-06-21 PROCEDURE — A9270 NON-COVERED ITEM OR SERVICE: HCPCS | Performed by: FAMILY MEDICINE

## 2019-06-21 RX ADMIN — DOXYCYCLINE 100 MG: 100 TABLET, FILM COATED ORAL at 06:25

## 2019-06-21 RX ADMIN — NYSTATIN: 100000 POWDER TOPICAL at 06:26

## 2019-06-21 RX ADMIN — OMEPRAZOLE 20 MG: 20 CAPSULE, DELAYED RELEASE ORAL at 06:25

## 2019-06-21 RX ADMIN — DILTIAZEM HYDROCHLORIDE 120 MG: 120 CAPSULE, COATED, EXTENDED RELEASE ORAL at 06:25

## 2019-06-21 RX ADMIN — LEVOTHYROXINE SODIUM 75 MCG: 75 TABLET ORAL at 06:25

## 2019-06-21 RX ADMIN — FUROSEMIDE 40 MG: 40 TABLET ORAL at 06:25

## 2019-06-21 RX ADMIN — DULOXETINE HYDROCHLORIDE 60 MG: 60 CAPSULE, DELAYED RELEASE ORAL at 06:25

## 2019-06-21 RX ADMIN — AMOXICILLIN AND CLAVULANATE POTASSIUM 1 TABLET: 875; 125 TABLET, FILM COATED ORAL at 06:25

## 2019-06-21 RX ADMIN — FERROUS GLUCONATE 324 MG: 324 TABLET ORAL at 06:25

## 2019-06-21 RX ADMIN — ROSUVASTATIN CALCIUM 10 MG: 10 TABLET, FILM COATED ORAL at 06:25

## 2019-06-21 RX ADMIN — POTASSIUM CHLORIDE 20 MEQ: 20 TABLET, EXTENDED RELEASE ORAL at 06:25

## 2019-06-21 RX ADMIN — PREDNISONE 40 MG: 20 TABLET ORAL at 06:25

## 2019-06-21 NOTE — DISCHARGE SUMMARY
Discharge Summary    CHIEF COMPLAINT ON ADMISSION  Chief Complaint   Patient presents with   • Shortness of Breath       Reason for Admission  EMS     Admission Date  6/14/2019    CODE STATUS  Full Code    HPI & HOSPITAL COURSE      68-year-old female past medical history morbid obesity, EDITH on CPAP machine, COPD on chronic 4 L of oxygen, diabetes mellitus, hypertension was admitted 6/14/2018 for COPD exacerbation and possible hospital-acquired pneumonia. She has been treated 6 times with antibiotic last 6 months. Last admission 06/03/2019. She was hypoxic on admission. CXR showed diffuse interstitial prominence and bibasilar opacity. She was started on IV ceftriaxone and doxy, methyl prednisone. Pro-calcitonin was normal. Sputum culture was possible contaminated. However due to her high risk antibiotic were continued. Initially she was treated with Rocephin (06/15-06/18),  Doxycycline (06/15-06/22). Switch to Augmentin (end date 06/22)  (allergic to levofloxacin) since she did have poor arterial access.   On admission was noted downtrending of hemoglobin and occult blood was positive.  1 unit of PRBC was given.  Gastroenterologist was consulted and did recommend outpatient endoscopy.   She has chronic kidney disease stage III. On admission she did have acute on chronic kidney injury which did improve to her baseline. Troponin and BNP was slight elevated. Echocardiogram was done and showed grade II diastolic dysfunction, moderate mitral stenosis, RSVP prior 60. Lasix was given and pt noticed some improvement on her leg swelling.       Therefore, she is discharged in guarded and stable condition to home with close outpatient follow-up.    The patient met 2-midnight criteria for an inpatient stay at the time of discharge.    Discharge Date  06/20/2019    FOLLOW UP ITEMS POST DISCHARGE  None     DISCHARGE DIAGNOSES  Active Problems:    DM type 2, uncontrolled, with renal complications (HCC) (Chronic) POA: Yes    COPD  (chronic obstructive pulmonary disease) (HCC) (Chronic) POA: Yes      Overview: On oxygen 3L nocturnal    CKD (chronic kidney disease), stage IV ([facility]) POA: Yes    Atrial fibrillation (HCC) (Chronic) POA: Yes    Essential hypertension POA: Yes    EDITH (obstructive sleep apnea) (Chronic) POA: Yes    Class 3 severe obesity due to excess calories with serious comorbidity and body mass index (BMI) of 40.0 to 44.9 in adult ([facility]) POA: Yes    Chronic diastolic congestive heart failure ([facility]) POA: Yes    Anemia POA: Yes    Non-rheumatic mitral regurgitation POA: Unknown  Resolved Problems:    HCAP (healthcare-associated pneumonia) POA: Yes    Acute on chronic respiratory failure with hypoxia ([facility]) POA: Yes    HAN (acute kidney injury) ([facility]) POA: Yes      FOLLOW UP  Future Appointments  Date Time Provider Department Center   6/26/2019 2:20 PM [facility]   7/1/2019 10:30 AM Jocelyn Alcantara M.D. [facility] None     No follow-up provider specified.    MEDICATIONS ON DISCHARGE     Medication List      START taking these medications      Instructions   amoxicillin-clavulanate 875-125 MG Tabs  Commonly known as:  AUGMENTIN   Take 1 Tab by mouth every 12 hours.  Dose:  1 Tab     doxycycline monohydrate 100 MG tablet  Commonly known as:  ADOXA   Take 1 Tab by mouth every 12 hours.  Dose:  100 mg     ferrous gluconate 324 (38 Fe) MG Tabs  Start taking on:  6/21/2019  Commonly known as:  FERGON   Take 1 Tab by mouth every morning with breakfast.  Dose:  324 mg     ipratropium-albuterol 0.5-2.5 (3) MG/3ML nebulizer solution  Commonly known as:  DUONEB   3 mL by Nebulization route every 6 hours as needed for Shortness of Breath.  Dose:  3 mL     potassium chloride SA 20 MEQ Tbcr  Start taking on:  6/21/2019  Commonly known as:  Kdur   Take 1 Tab by mouth every day.  Dose:  20 mEq     predniSONE 20 MG Tabs  Start taking on:  6/21/2019  Commonly known as:  DELTASONE   40 mg daily PO        CONTINUE taking these medications   "    Instructions   albuterol 108 (90 Base) MCG/ACT Aers inhalation aerosol   Inhale 2 Puffs by mouth every four hours as needed for Shortness of Breath.  Dose:  2 Puff     amitriptyline 50 MG Tabs  Commonly known as:  ELAVIL   Take 50 mg by mouth every bedtime.  Dose:  50 mg     cyclobenzaprine 10 MG Tabs  Commonly known as:  FLEXERIL   Take 10 mg by mouth every bedtime.  Dose:  10 mg     DILTIAZem  MG Cp24  Commonly known as:  CARDIZEM CD   Take 1 Cap by mouth every day.  Dose:  120 mg     DULoxetine 60 MG Cpep delayed-release capsule  Commonly known as:  CYMBALTA   TAKE 1 CAPSULE BY MOUTH DAILY     furosemide 40 MG Tabs  Commonly known as:  LASIX   Take 1 Tab by mouth every day.  Dose:  40 mg     insulin glargine 100 UNIT/ML Soln  Commonly known as:  LANTUS   Inject 20 Units as instructed every evening.  Dose:  20 Units     LACTULOSE PO   Take  by mouth as needed.     levothyroxine 75 MCG Tabs  Commonly known as:  SYNTHROID   TAKE 1 TABLET BY MOUTH DAILY     rosuvastatin 10 MG Tabs  Commonly known as:  CRESTOR   Take 10 mg by mouth every morning.  Dose:  10 mg     traZODone 150 MG Tabs  Commonly known as:  DESYREL   Take 150 mg by mouth every bedtime.  Dose:  150 mg     TRELEGY ELLIPTA 100-62.5-25 MCG/INH Aepb  Generic drug:  Fluticasone-Umeclidin-Vilant   Inhale 1 Puff by mouth every day.  Dose:  1 Puff            Allergies  Allergies   Allergen Reactions   • Vitamin C Diarrhea     \"violent diarrhea\"   • Codeine Nausea     Severe stomach pain   • Gabapentin Palpitations     Gets shaky and falls    • Keflex Rash     Severe rash, but TOLERATES PENICILLINS, Rocephin    • Lyrica Nausea     Nausea, dizzy   • Sudafed Nausea and Unspecified     Chest pain, nausea   • Powders        DIET  Orders Placed This Encounter   Procedures   • Diet Order Cardiac, Diabetic     Standing Status:   Standing     Number of Occurrences:   1     Order Specific Question:   Diet:     Answer:   Cardiac [6]     Order Specific Question:  "  Diet:     Answer:   Diabetic [3]       ACTIVITY  As tolerated.  Weight bearing as tolerated    CONSULTATIONS  GI     PROCEDURES  None     LABORATORY  Lab Results   Component Value Date    SODIUM 139 06/20/2019    POTASSIUM 4.9 06/20/2019    CHLORIDE 103 06/20/2019    CO2 28 06/20/2019    GLUCOSE 169 (H) 06/20/2019    BUN 52 (H) 06/20/2019    CREATININE 2.05 (H) 06/20/2019        Lab Results   Component Value Date    WBC 17.8 (H) 06/20/2019    HEMOGLOBIN 7.4 (L) 06/20/2019    HEMATOCRIT 25.1 (L) 06/20/2019    PLATELETCT 220 06/20/2019        Total time of the discharge process exceeds 30 minutes.

## 2019-06-21 NOTE — PROGRESS NOTES
Transportation for patient was not able to be set up.  Patient will discharge tomorrow.  Discharge paperwork completed.  Oxygen tank at bedside.

## 2019-06-21 NOTE — PROGRESS NOTES
Patient discharged home. Patient AOX 4.  IV and tele box removed, discharge instructions provided to patient and reviewed with them. All questions answered, patient provided copy of discharge instructions and instructed on when to F/U with MD. Patient wheeled off unit. Patient discharged into the care of [facility].

## 2019-06-24 ENCOUNTER — PATIENT OUTREACH (OUTPATIENT)
Dept: HEALTH INFORMATION MANAGEMENT | Facility: OTHER | Age: 69
End: 2019-06-24

## 2019-06-26 ENCOUNTER — OFFICE VISIT (OUTPATIENT)
Dept: MEDICAL GROUP | Facility: PHYSICIAN GROUP | Age: 69
End: 2019-06-26
Payer: MEDICARE

## 2019-06-26 VITALS
DIASTOLIC BLOOD PRESSURE: 54 MMHG | RESPIRATION RATE: 16 BRPM | WEIGHT: 239 LBS | HEART RATE: 65 BPM | HEIGHT: 65 IN | TEMPERATURE: 98.7 F | BODY MASS INDEX: 39.82 KG/M2 | OXYGEN SATURATION: 95 % | SYSTOLIC BLOOD PRESSURE: 114 MMHG

## 2019-06-26 DIAGNOSIS — Z23 NEED FOR VACCINATION: ICD-10-CM

## 2019-06-26 DIAGNOSIS — M15.9 PRIMARY OSTEOARTHRITIS INVOLVING MULTIPLE JOINTS: ICD-10-CM

## 2019-06-26 DIAGNOSIS — G89.4 CHRONIC PAIN SYNDROME: Chronic | ICD-10-CM

## 2019-06-26 DIAGNOSIS — Z79.891 CHRONIC USE OF OPIATE DRUGS THERAPEUTIC PURPOSES: ICD-10-CM

## 2019-06-26 DIAGNOSIS — M47.16 LUMBAR SPONDYLOSIS WITH MYELOPATHY: ICD-10-CM

## 2019-06-26 DIAGNOSIS — Z12.39 BREAST CANCER SCREENING: ICD-10-CM

## 2019-06-26 DIAGNOSIS — J44.9 CHRONIC OBSTRUCTIVE PULMONARY DISEASE, UNSPECIFIED COPD TYPE (HCC): Chronic | ICD-10-CM

## 2019-06-26 DIAGNOSIS — N18.4 CKD (CHRONIC KIDNEY DISEASE), STAGE IV (HCC): ICD-10-CM

## 2019-06-26 PROCEDURE — 92250 FUNDUS PHOTOGRAPHY W/I&R: CPT | Mod: TC | Performed by: FAMILY MEDICINE

## 2019-06-26 PROCEDURE — 99214 OFFICE O/P EST MOD 30 MIN: CPT | Performed by: FAMILY MEDICINE

## 2019-06-26 RX ORDER — OXYCODONE HYDROCHLORIDE 10 MG/1
10 TABLET ORAL 3 TIMES DAILY PRN
Qty: 90 TAB | Refills: 0 | Status: SHIPPED | OUTPATIENT
Start: 2019-07-26 | End: 2019-06-26 | Stop reason: SDUPTHER

## 2019-06-26 RX ORDER — OXYCODONE HYDROCHLORIDE 10 MG/1
10 TABLET ORAL 3 TIMES DAILY PRN
Qty: 90 TAB | Refills: 0 | Status: SHIPPED | OUTPATIENT
Start: 2019-08-26 | End: 2019-09-18 | Stop reason: SDUPTHER

## 2019-06-26 RX ORDER — FERROUS GLUCONATE 324(37.5)
TABLET ORAL
Refills: 1 | COMMUNITY
Start: 2019-06-20 | End: 2019-06-26

## 2019-06-26 RX ORDER — OXYCODONE HYDROCHLORIDE 10 MG/1
10 TABLET ORAL 3 TIMES DAILY PRN
Qty: 90 TAB | Refills: 0 | Status: SHIPPED | OUTPATIENT
Start: 2019-06-26 | End: 2019-06-26 | Stop reason: SDUPTHER

## 2019-06-26 RX ORDER — DOXYCYCLINE HYCLATE 100 MG
TABLET ORAL
Refills: 0 | COMMUNITY
Start: 2019-06-20 | End: 2019-06-26

## 2019-06-26 NOTE — ASSESSMENT & PLAN NOTE
A1c at 6.7 patient notes elevated blood sugars due to steroid use for chronic pain  Patient has stage IV CKD  She is encouraged to get annual retinal screening done and daily foot exam.  Patient is on rosuvastatin 10 mg daily.  Blood sugars controlled with Lantus 20 units nightly.  We will review aspirin 81 mg with the patient.

## 2019-06-26 NOTE — ASSESSMENT & PLAN NOTE
Patient presents today for refill she is on oxycodone immediate release 10 mg.    Patient presents for refill of oxycodone immediate release 10 mg which she takes now about 3 times a day. Decreased from 4 times a day for chronic pain due to arthritis and spondylosis. She is advised to wean down gradually as narcotic pain medication is no longer recommended for chronic pain.       and UDS with no concerns.   Other medications she uses for pain: flexeril, cymbalta, elavil, tylenol   Refills for 3 months provided.   Advised no alcohol or drugs with this medication.   Patient is advised refills at clinic visits only she is not to share this medication or lose prescriptions.

## 2019-06-26 NOTE — ASSESSMENT & PLAN NOTE
Patient notes that she has been hospitalized a few times with recent COPD exacerbation she is followed by her pulmonologist currently on Trelegy and notes that she has been on oxygen 4 L not needing to go up to the 6 L.  She does have a follow-up July 1 with her pulmonologist.

## 2019-06-26 NOTE — ASSESSMENT & PLAN NOTE
Patient is under the care of her nephrologist.  She is on lisinopril and Lasix 40 mg with potassium supplement   She is advised to avoid all NSAIDs.  She is not on nephrotoxic medications like metformin or lithium.  GFR in the 20s creatinine has shown an increased to 2.05  Her next appointment with her nephrologist is

## 2019-06-27 NOTE — PROGRESS NOTES
Subjective:   Priya Callahan is a 68 y.o. female here today for evaluation and management of:     CKD (chronic kidney disease), stage IV ([facility])  Patient is under the care of her nephrologist.  She is on lisinopril and Lasix 40 mg with potassium supplement   She is advised to avoid all NSAIDs.  She is not on nephrotoxic medications like metformin or lithium.  GFR in the 20s creatinine has shown an increased to 2.05  Her next appointment with her nephrologist is    Chronic use of opiate drugs therapeutic purposes  Patient presents today for refill she is on oxycodone immediate release 10 mg.    Patient presents for refill of oxycodone immediate release 10 mg which she takes now about 3 times a day. Decreased from 4 times a day for chronic pain due to arthritis and spondylosis. She is advised to wean down gradually as narcotic pain medication is no longer recommended for chronic pain.       and UDS with no concerns.   Other medications she uses for pain: flexeril, cymbalta, elavil, tylenol   Refills for 3 months provided.   Advised no alcohol or drugs with this medication.   Patient is advised refills at clinic visits only she is not to share this medication or lose prescriptions.       DM type 2, uncontrolled, with renal complications ([facility])  A1c at 6.7 patient notes elevated blood sugars due to steroid use for chronic pain  Patient has stage IV CKD  She is encouraged to get annual retinal screening done and daily foot exam.  Patient is on rosuvastatin 10 mg daily.  Blood sugars controlled with Lantus 20 units nightly.  We will review aspirin 81 mg with the patient.    COPD (chronic obstructive pulmonary disease) ([facility])  Patient notes that she has been hospitalized a few times with recent COPD exacerbation she is followed by her pulmonologist currently on [facility] and notes that she has been on oxygen 4 L not needing to go up to the 6 L.  She does have a follow-up July 1 with her pulmonologist.          Current medicines (including changes today)  Current Outpatient Prescriptions   Medication Sig Dispense Refill   • [START ON 8/26/2019] oxyCODONE immediate release (ROXICODONE) 10 MG immediate release tablet Take 1 Tab by mouth 3 times a day as needed for Moderate Pain for up to 30 days. 90 Tab 0   • ipratropium-albuterol (DUONEB) 0.5-2.5 (3) MG/3ML nebulizer solution 3 mL by Nebulization route every 6 hours as needed for Shortness of Breath. 30 Bullet 0   • ferrous gluconate (FERGON) 324 (38 Fe) MG Tab Take 1 Tab by mouth every morning with breakfast. 30 Tab 1   • potassium chloride SA (KDUR) 20 MEQ Tab CR Take 1 Tab by mouth every day. 60 Tab 1   • DILTIAZem CD (CARDIZEM CD) 120 MG CAPSULE SR 24 HR Take 1 Cap by mouth every day. 30 Cap 2   • furosemide (LASIX) 40 MG Tab Take 1 Tab by mouth every day. 30 Tab 2   • LACTULOSE PO Take  by mouth as needed.     • traZODone (DESYREL) 150 MG Tab Take 150 mg by mouth every bedtime.  3   • Fluticasone-Umeclidin-Vilant (TRELEGY ELLIPTA) 100-62.5-25 MCG/INH AEROSOL POWDER, BREATH ACTIVATED Inhale 1 Puff by mouth every day.     • insulin glargine (LANTUS) 100 UNIT/ML Solution Inject 20 Units as instructed every evening.     • rosuvastatin (CRESTOR) 10 MG Tab Take 10 mg by mouth every morning.     • cyclobenzaprine (FLEXERIL) 10 MG Tab Take 10 mg by mouth every bedtime.  2   • levothyroxine (SYNTHROID) 75 MCG Tab TAKE 1 TABLET BY MOUTH DAILY 90 Tab 3   • amitriptyline (ELAVIL) 50 MG Tab Take 50 mg by mouth every bedtime.  3   • DULoxetine (CYMBALTA) 60 MG Cap DR Particles delayed-release capsule TAKE 1 CAPSULE BY MOUTH DAILY 90 Cap 1   • albuterol 108 (90 Base) MCG/ACT Aero Soln inhalation aerosol Inhale 2 Puffs by mouth every four hours as needed for Shortness of Breath.  3     No current facility-administered medications for this visit.      She  has a past medical history of Arthritis; ASTHMA; Backpain; Breath shortness; Bronchitis; Congestive heart failure (HCC)  "(2013); COPD; Diabetes; Disorder of thyroid; Fall; Fibromyalgia; GERD (gastroesophageal reflux disease); Heart burn; Hepatitis C; Hypertension; Indigestion; Infectious disease; Neuropathy (HCC); On home oxygen therapy; Other specified symptom associated with female genital organs; Pain (9/13/2016); PND (post-nasal drip); Pneumonia; Psychiatric problem (9/13/2016); RLS (restless legs syndrome); Sleep apnea; and Unspecified urinary incontinence.    ROS  No chest pain, no shortness of breath, no abdominal pain       Objective:     /54 (BP Location: Right arm, Patient Position: Sitting, BP Cuff Size: Adult)   Pulse 65   Temp 37.1 °C (98.7 °F) (Temporal)   Resp 16   Ht 1.651 m (5' 5\")   Wt 108.4 kg (239 lb)   SpO2 95%  Body mass index is 39.77 kg/m².   Physical Exam:  Constitutional: Alert, no distress.  Skin: Warm, dry, good turgor, no rashes in visible areas.  Eye: Equal, round and reactive, conjunctiva clear, lids normal.  ENMT: Lips without lesions, good dentition, oropharynx clear.  Neck: Trachea midline, no masses, no thyromegaly. No cervical or supraclavicular lymphadenopathy  Respiratory: Unlabored respiratory effort, lungs clear to auscultation, no wheezes, no ronchi.  Cardiovascular: Normal S1, S2, no murmur, no edema.  Abdomen: Soft, non-tender, no masses, no hepatosplenomegaly.  Psych: Alert and oriented x3, normal affect and mood.        Assessment and Plan:   The following treatment plan was discussed    1. CKD (chronic kidney disease), stage IV (HCC)  Chronic condition patient encouraged to follow-up with her nephrologist  - Comp Metabolic Panel; Future    2. Chronic use of opiate drugs therapeutic purposes  Refills provided.  Refills only at clinic visits.  - Controlled Substance Treatment Agreement  - PAIN MANAGEMENT PANEL, SCRN W/ RFLX TO QNT; Future    3. Breast cancer screening  - MA-SCREEN MAMMO W/CAD-BILAT; Future    4. Need for vaccination    5. DM type 2, uncontrolled, with renal " complications (HCC)  Controlled  - POCT Retinal Eye Exam    6. Primary osteoarthritis involving multiple joints  No acute changes  - oxyCODONE immediate release (ROXICODONE) 10 MG immediate release tablet; Take 1 Tab by mouth 3 times a day as needed for Moderate Pain for up to 30 days.  Dispense: 90 Tab; Refill: 0    7. Chronic obstructive pulmonary disease, unspecified COPD type ([facility])  Chronic condition continue follow-up with pulmonologist continue with inhalers and oxygen supplementation      Followup: Return in about 3 months (around 9/26/2019) for pain med refills, 1 month for lab check.

## 2019-07-01 ENCOUNTER — TELEMEDICINE2 (OUTPATIENT)
Dept: PULMONOLOGY | Facility: HOSPICE | Age: 69
End: 2019-07-01
Payer: MEDICARE

## 2019-07-01 VITALS
RESPIRATION RATE: 20 BRPM | HEIGHT: 65 IN | TEMPERATURE: 99.2 F | HEART RATE: 72 BPM | SYSTOLIC BLOOD PRESSURE: 110 MMHG | OXYGEN SATURATION: 91 % | WEIGHT: 230 LBS | BODY MASS INDEX: 38.32 KG/M2 | DIASTOLIC BLOOD PRESSURE: 60 MMHG

## 2019-07-01 DIAGNOSIS — J44.9 CHRONIC OBSTRUCTIVE PULMONARY DISEASE, UNSPECIFIED COPD TYPE (HCC): Chronic | ICD-10-CM

## 2019-07-01 DIAGNOSIS — R91.8 PULMONARY NODULES: ICD-10-CM

## 2019-07-01 DIAGNOSIS — E66.01 CLASS 3 SEVERE OBESITY DUE TO EXCESS CALORIES WITH SERIOUS COMORBIDITY AND BODY MASS INDEX (BMI) OF 40.0 TO 44.9 IN ADULT (HCC): ICD-10-CM

## 2019-07-01 PROCEDURE — 99214 OFFICE O/P EST MOD 30 MIN: CPT | Performed by: INTERNAL MEDICINE

## 2019-07-01 RX ORDER — METHYLPREDNISOLONE 4 MG/1
TABLET ORAL
Qty: 21 TAB | Refills: 1 | Status: SHIPPED | OUTPATIENT
Start: 2019-07-01 | End: 2019-07-20

## 2019-07-01 RX ORDER — AZITHROMYCIN 250 MG/1
TABLET, FILM COATED ORAL
Qty: 6 TAB | Refills: 1 | Status: SHIPPED | OUTPATIENT
Start: 2019-07-01 | End: 2019-07-20

## 2019-07-01 ASSESSMENT — PAIN SCALES - GENERAL: PAINLEVEL: 2=MINIMAL-SLIGHT

## 2019-07-01 NOTE — PROGRESS NOTES
Priya Callahan is a 68 y.o. female here for COPD on oxygen with recent exacerbation, hospitalized. Patient was referred by her primary care.    History of Present Illness:      This lady is seen by telemedicine in [place], is followed by Dr. Mccall.    She has COPD, frequent exacerbations and was just hospitalized at [facility] over a week ago.  She is getting back toward her baseline but oxygen needs went up, she requires tanks, as she exceeded the concentrator delivery with walking and activity.    She uses albuterol nebulized, steroid Dosepak, maintenance medications are reviewed.  She is finishing the steroid taper, finish the antibiotics.  Symbicort is also in place as well as supplemental oxygen at 5 L/min as a baseline.    Elevated body mass index contributes to her low oxygen, she understands importance of gentle exercise is much as oxygen and deconditioning allow, and portion control.    Health maintenance issues include stopping smoking in March, this is encouraged, vaccinations are reviewed and flu pneumonia vaccines are current, and elevated body mass index addressed as described above.    She does have sleep apnea, has nighttime oxygen, CPAP has not been utilized recently.    The last issue is lung nodule discovered in May, follow-up imaging is planned in November, I would like to see her after that is accomplished yet about a 6-month interval, by telemedicine would be acceptable as long as we have access to the images for review.    Constitutional ROS: No unexpected change in weight, No unexplained fevers  Eyes: No change in vision or blurring or double vision  Mouth/Throat ROS: No sore throat, No recent change in voice or hoarseness  Pulmonary ROS: See present history for pertinent positives  Cardiovascular ROS: No chest pain to suggest acute coronary syndrome  Gastrointestinal ROS: No abdominal pain to suggest peptic disease  Musculoskeletal/Extremities ROS: no acute artritis or unusual  swelling  Hematologic/Lymphatic ROS: No easy bleeding or unusual lymph node swelling  Neurologic ROS: No new or unusual weakness  Psychiatric ROS: No hallucinations  Allergic/Immunologic: No  urticaria or allergic rash      Current Outpatient Prescriptions   Medication Sig Dispense Refill   • methylPREDNISolone (MEDROL DOSEPAK) 4 MG Tablet Therapy Pack Take as directed. 21 Tab 1   • azithromycin (ZITHROMAX Z-LUANN) 250 MG Tab Take 2 tablets on day 1. Then take 1 tablet a day for 4 days. 6 Tab 1   • ferrous gluconate (FERGON) 324 (38 Fe) MG Tab Take 1 Tab by mouth every morning with breakfast. 30 Tab 1   • potassium chloride SA (KDUR) 20 MEQ Tab CR Take 1 Tab by mouth every day. 60 Tab 1   • DILTIAZem CD (CARDIZEM CD) 120 MG CAPSULE SR 24 HR Take 1 Cap by mouth every day. 30 Cap 2   • furosemide (LASIX) 40 MG Tab Take 1 Tab by mouth every day. 30 Tab 2   • traZODone (DESYREL) 150 MG Tab Take 150 mg by mouth every bedtime.  3   • insulin glargine (LANTUS) 100 UNIT/ML Solution Inject 20 Units as instructed every evening.     • rosuvastatin (CRESTOR) 10 MG Tab Take 10 mg by mouth every morning.     • cyclobenzaprine (FLEXERIL) 10 MG Tab Take 10 mg by mouth every bedtime.  2   • levothyroxine (SYNTHROID) 75 MCG Tab TAKE 1 TABLET BY MOUTH DAILY 90 Tab 3   • amitriptyline (ELAVIL) 50 MG Tab Take 50 mg by mouth every bedtime.  3   • DULoxetine (CYMBALTA) 60 MG Cap DR Particles delayed-release capsule TAKE 1 CAPSULE BY MOUTH DAILY 90 Cap 1   • [START ON 8/26/2019] oxyCODONE immediate release (ROXICODONE) 10 MG immediate release tablet Take 1 Tab by mouth 3 times a day as needed for Moderate Pain for up to 30 days. 90 Tab 0   • ipratropium-albuterol (DUONEB) 0.5-2.5 (3) MG/3ML nebulizer solution 3 mL by Nebulization route every 6 hours as needed for Shortness of Breath. 30 Bullet 0   • LACTULOSE PO Take  by mouth as needed.     • Fluticasone-Umeclidin-Vilant (TRELEGY ELLIPTA) 100-62.5-25 MCG/INH AEROSOL POWDER, BREATH ACTIVATED  "Inhale 1 Puff by mouth every day.     • albuterol 108 (90 Base) MCG/ACT Aero Soln inhalation aerosol Inhale 2 Puffs by mouth every four hours as needed for Shortness of Breath.  3     No current facility-administered medications for this visit.        Social History   Substance Use Topics   • Smoking status: Former Smoker     Packs/day: 1.00     Years: 50.00     Types: Cigarettes     Quit date: 3/20/2019   • Smokeless tobacco: Never Used   • Alcohol use No        Past Medical History:   Diagnosis Date   • Arthritis     \"everywhere\"   • ASTHMA    • Backpain     chronic back pain   • Breath shortness     \"only with flare up with allergies\"   • Bronchitis     as a child   • Congestive heart failure (HCC) 2013    related to pulmonary failure   • COPD    • Diabetes     Type 2   • Disorder of thyroid     Hypothyroid   • Fall    • Fibromyalgia    • GERD (gastroesophageal reflux disease)    • Heart burn    • Hepatitis C     \"I have markers in blood but not active\"   • Hypertension    • Indigestion    • Infectious disease     Hepatitis C   • Neuropathy (HCC)     bilat feet   • On home oxygen therapy    • Other specified symptom associated with female genital organs     Hysterectomy at age 22yrs   • Pain 9/13/2016    \"pain everywhere\"   • PND (post-nasal drip)    • Pneumonia     as a child   • Psychiatric problem 9/13/2016    PTSD   • RLS (restless legs syndrome)    • Sleep apnea     does not wear CPAP, \"can't do it\"   • Unspecified urinary incontinence        Past Surgical History:   Procedure Laterality Date   • ANKLE ORIF Left 5/8/2017    Procedure: ANKLE ORIF;  Surgeon: Rico Gtz M.D.;  Location: SURGERY [facility] ORS;  Service:    • IA DSTR NROLYTC AGNT PARVERTEB FCT SNGL LMBR/SACRAL Left 9/16/2016    Procedure: NEURO DEST FACET L/S W/IG SNGL - L3-S1;  Surgeon: Xavier Arteaga D.O.;  Location: SURGERY [facility] ORS;  Service: Pain Management   • IA DSTR NROLYTC AGNT PARVERTEB FCT ADDL LMBR/SACRAL  " 9/16/2016    Procedure: NEURO DEST FACET L/S W/IG ADDL;  Surgeon: Xavier Arteaga D.O.;  Location: SURGERY SURGICAL [facility] ORS;  Service: Pain Management   • IA DSTR NROLYTC AGNT PARVERTEB FCT ADDL LMBR/SACRAL  9/16/2016    Procedure: NEURO DEST FACET L/S W/IG ADDL;  Surgeon: Xavier Arteaga D.O.;  Location: SURGERY SURGICAL [facility] ORS;  Service: Pain Management   • PB FLUOROSCOPIC GUIDANCE NEEDLE PLACEMENT  9/16/2016    Procedure: FLOURO GUIDE NEEDLE PLACEMENT;  Surgeon: Xavier Arteaga D.O.;  Location: SURGERY [facility] ORS;  Service: Pain Management   • IA DSTR NROLYTC AGNT PARVERTEB FCT SNGL LMBR/SACRAL Right 11/6/2015    Procedure: NEURO DEST FACET L/S W/IG SNGL - L3-S1;  Surgeon: Xavier Arteaga D.O.;  Location: SURGERY SURGICAL [facility] ORS;  Service: Pain Management   • IA DSTR NROLYTC AGNT PARVERTEB FCT ADDL LMBR/SACRAL  11/6/2015    Procedure: NEURO DEST FACET L/S W/IG ADDL;  Surgeon: Xavier Arteaga D.O.;  Location: SURGERY SURGICAL [facility] ORS;  Service: Pain Management   • PB INJECT RX OTHER PERIPH NERVE  11/6/2015    Procedure: NEUROLYTIC DEST-OTHER NERVE;  Surgeon: Xavier Arteaga D.O.;  Location: SURGERY [facility] ORS;  Service: Pain Management   • IA DSTR NROLYTC AGNT PARVERTEB FCT ADDL LMBR/SACRAL  11/6/2015    Procedure: NEURO DEST FACET L/S W/IG ADDL;  Surgeon: Xavier Arteaga D.O.;  Location: SURGERY [facility] ORS;  Service: Pain Management   • IA DSTR NROLYTC AGNT PARVERTEB FCT SNGL LMBR/SACRAL Left 10/2/2015    Procedure: NEURO DEST FACET L/S W/IG SNGL - L3-S1;  Surgeon: Xavier Arteaga D.O.;  Location: SURGERY [facility] ORS;  Service: Pain Management   • IA DSTR NROLYTC AGNT PARVERTEB FCT ADDL LMBR/SACRAL  10/2/2015    Procedure: NEURO DEST FACET L/S W/IG ADDL;  Surgeon: Xavier Arteaga D.O.;  Location: SURGERY [facility];  Service: Pain Management   • PB INJECT RX OTHER PERIPH NERVE  10/2/2015    Procedure: NEUROLYTIC DEST-OTHER NERVE;  Surgeon: Xavier WYLIE  EBONY Arteaga;  Location: [facility] ORS;  Service: Pain Management   • MN DSTR NROLYTC AGNT PARVERTEB FCT ADDL LMBR/SACRAL  10/2/2015    Procedure: NEURO DEST FACET L/S W/IG ADDL;  Surgeon: Xavier Arteaga D.O.;  Location: [facility] ORS;  Service: Pain Management   • PB INJ DX/THER AGNT PARAVERT FACET JOINT, OBED* Left 7/17/2015    Procedure: INJ PARA FACET L/S 1 LVL W/IG - L3-S1;  Surgeon: Xavier Arteaga D.O.;  Location: [facility] ORS;  Service: Pain Management   • PB INJ DX/THER AGNT PARAVERT FACET JOINT, OBED*  7/17/2015    Procedure: INJ PARA FACET L/S 2D LVL W/IG;  Surgeon: Xavier Arteaga D.O.;  Location: [facility] ORS;  Service: Pain Management   • PB INJ DX/THER AGNT PARAVERT FACET JOINT, OBED*  7/17/2015    Procedure: INJ PARA FACET L/S 3D LVL W/IG;  Surgeon: Xavier Arteaga D.O.;  Location: [facility] ORS;  Service: Pain Management   • PB INJECT NERV BLCK,OTHR PERIPH NERV  7/17/2015    Procedure: INJ-ANES AGENT-OTHER PHER.NRVE;  Surgeon: Xavier Arteaga D.O.;  Location: [facility] ORS;  Service: Pain Management   • PB INJ DX/THER AGNT PARAVERT FACET JOINT, OBED* Left 7/10/2015    Procedure: INJ PARA FACET L/S 1 LVL W/IG - L3-S1;  Surgeon: Xavier Arteaga D.O.;  Location: [facility] ORS;  Service: Pain Management   • PB INJ DX/THER AGNT PARAVERT FACET JOINT, OBED*  7/10/2015    Procedure: INJ PARA FACET L/S 2D LVL W/IG;  Surgeon: Xavier Arteaga D.O.;  Location: [facility] ORS;  Service: Pain Management   • PB INJ DX/THER AGNT PARAVERT FACET JOINT, OBED*  7/10/2015    Procedure: INJ PARA FACET L/S 3D LVL W/IG;  Surgeon: Xavier Arteaga D.O.;  Location: [facility];  Service: Pain Management   • PB INJECT NERV BLCK,OTHR PERIPH NERV  7/10/2015    Procedure: INJ-ANES AGENT-OTHER PHER.NRVE;  Surgeon: Xavier Arteaga D.O.;  Location: SURGERY [facility];  Service: Pain Management   •  "GYN SURGERY      hysterectomy    • LUMPECTOMY Left     Left breast   • OTHER ORTHOPEDIC SURGERY      L knee replacement    • OTHER ORTHOPEDIC SURGERY      R carpal tunnel    • US-NEEDLE CORE BX-BREAST PANEL         Allergies: Vitamin c; Codeine; Gabapentin; Keflex; Lyrica; Powders; and Sudafed    Family History   Problem Relation Age of Onset   • Lung Disease Mother    • Alcohol/Drug Mother    • Heart Disease Mother    • Cancer Father    • Cancer Brother    • Diabetes Brother    • Diabetes Maternal Grandmother    • Stroke Paternal Grandmother    • Heart Disease Paternal Grandmother        Physical Examination    Vitals:    07/01/19 1039   Height: 1.651 m (5' 5\")   Weight: 104.3 kg (230 lb)   Weight % change since last entry.: 0 %   BP: 110/60   Pulse: 72   BMI (Calculated): 38.27   Resp: 20   Temp: 37.3 °C (99.2 °F)   TempSrc: Oral       General Appearance: alert, no distress  Skin: Skin color, texture, turgor normal. No rashes or lesions.  Eyes: negative  Oropharynx: Lips, mucosa, and tongue normal. Teeth and gums normal. Oropharynx moist and without lesion  Lungs: positive findings: By telemedicine exam, no wheezing or rhonchi  Heart: negative. RRR without murmur, gallop, or rubs.  No ectopy.  Abdomen: Abdomen soft, non-tender. . No masses,  No organomegaly  Extremities:  No deformities, 1+ edema, or skin discoloration  Joints: No acute arthritis  Peripheral Pulses:perfused  Neurologic: intact grossly  Ankle edema; on oxygen no cyanosis      Imaging: X-rays and CAT scans reviewed, follow-up nodule planned in November    PFTS: None presently      Assessment and Plan  1. Chronic obstructive pulmonary disease, unspecified COPD type (HCC)  Recent exacerbation, hospitalized, continue maintenance inhalers, nebulized albuterol, and new orders for Medrol Dosepak and Zithromax Z-Panda sent to her pharmacy in [place]    2. Class 3 severe obesity due to excess calories with serious comorbidity and body mass index (BMI) of 40.0 " to 44.9 in adult (HCC)  - OBESITY COUNSELING (No Charge): Patient identified as having weight management issue.  Appropriate orders and counseling given.    3. Pulmonary nodules  Follow-up imaging planned      Followup Return in about 6 months (around 1/1/2020) for follow up visit with Dr. Jocelyn Alcantara.

## 2019-07-01 NOTE — PATIENT INSTRUCTIONS
This lady is seen by telemedicine in [place], is followed by Dr. Mccall.    She has COPD, frequent exacerbations and was just hospitalized at [facility] over a week ago.  She is getting back toward her baseline but oxygen needs went up, she requires tanks, as she exceeded the concentrator delivery with walking and activity.    She uses albuterol nebulized, steroid Dosepak, maintenance medications are reviewed.  She is finishing the steroid taper, finish the antibiotics.  Symbicort is also in place as well as supplemental oxygen at 5 L/min as a baseline.    Elevated body mass index contributes to her low oxygen, she understands importance of gentle exercise is much as oxygen and deconditioning allow, and portion control.    Health maintenance issues include stopping smoking in March, this is encouraged, vaccinations are reviewed and flu pneumonia vaccines are current, and elevated body mass index addressed as described above.    She does have sleep apnea, has nighttime oxygen, CPAP has not been utilized recently.    The last issue is lung nodule discovered in May, follow-up imaging is planned in November, I would like to see her after that is accomplished yet about a 6-month interval, by telemedicine would be acceptable as long as we have access to the images for review.

## 2019-07-03 NOTE — PROGRESS NOTES
Received alert and oriented x 4, v/s stable, due med given as ordered, assisted by one person at the bed side commode, left heel nwb, with boots when getting up,  fs 230 offered snack, call light within reach, needs attended, will continue to monitor.   Yes, may put Josh in appointment spot on Monday.

## 2019-07-11 RX ORDER — CLOBETASOL PROPIONATE 0.5 MG/G
CREAM TOPICAL
Qty: 1 TUBE | Refills: 0 | Status: CANCELLED | OUTPATIENT
Start: 2019-07-11

## 2019-07-12 RX ORDER — CALCIPOTRIENE 50 UG/G
CREAM TOPICAL
Qty: 360 G | Refills: 3 | Status: SHIPPED | OUTPATIENT
Start: 2019-07-12 | End: 2019-08-15

## 2019-07-20 ENCOUNTER — APPOINTMENT (OUTPATIENT)
Dept: RADIOLOGY | Facility: MEDICAL CENTER | Age: 69
DRG: 602 | End: 2019-07-20
Attending: EMERGENCY MEDICINE
Payer: MEDICARE

## 2019-07-20 ENCOUNTER — HOSPITAL ENCOUNTER (INPATIENT)
Facility: MEDICAL CENTER | Age: 69
LOS: 1 days | DRG: 602 | End: 2019-07-22
Attending: EMERGENCY MEDICINE | Admitting: HOSPITALIST
Payer: MEDICARE

## 2019-07-20 DIAGNOSIS — L03.116 CELLULITIS OF LEFT LOWER EXTREMITY: ICD-10-CM

## 2019-07-20 DIAGNOSIS — I50.9 ACUTE CONGESTIVE HEART FAILURE, UNSPECIFIED HEART FAILURE TYPE (HCC): ICD-10-CM

## 2019-07-20 PROBLEM — J96.11 CHRONIC HYPOXEMIC RESPIRATORY FAILURE (HCC): Status: ACTIVE | Noted: 2019-07-20

## 2019-07-20 PROBLEM — N18.9 CKD (CHRONIC KIDNEY DISEASE): Status: ACTIVE | Noted: 2019-07-20

## 2019-07-20 PROBLEM — J96.21 ACUTE ON CHRONIC RESPIRATORY FAILURE WITH HYPOXIA (HCC): Status: ACTIVE | Noted: 2019-07-20

## 2019-07-20 PROBLEM — J44.1 COPD EXACERBATION (HCC): Status: ACTIVE | Noted: 2019-07-20

## 2019-07-20 LAB
ANION GAP SERPL CALC-SCNC: 8 MMOL/L (ref 0–11.9)
ANISOCYTOSIS BLD QL SMEAR: ABNORMAL
BASOPHILS # BLD AUTO: 0 % (ref 0–1.8)
BASOPHILS # BLD: 0 K/UL (ref 0–0.12)
BUN SERPL-MCNC: 29 MG/DL (ref 8–22)
CALCIUM SERPL-MCNC: 8.9 MG/DL (ref 8.5–10.5)
CHLORIDE SERPL-SCNC: 104 MMOL/L (ref 96–112)
CO2 SERPL-SCNC: 28 MMOL/L (ref 20–33)
CREAT SERPL-MCNC: 2.05 MG/DL (ref 0.5–1.4)
CRP SERPL HS-MCNC: 4.73 MG/DL (ref 0–0.75)
DACRYOCYTES BLD QL SMEAR: NORMAL
EKG IMPRESSION: NORMAL
EOSINOPHIL # BLD AUTO: 0.08 K/UL (ref 0–0.51)
EOSINOPHIL NFR BLD: 0.9 % (ref 0–6.9)
ERYTHROCYTE [DISTWIDTH] IN BLOOD BY AUTOMATED COUNT: 61.7 FL (ref 35.9–50)
FERRITIN SERPL-MCNC: 722 NG/ML (ref 10–291)
FOLATE SERPL-MCNC: 15.1 NG/ML
GLUCOSE SERPL-MCNC: 158 MG/DL (ref 65–99)
HCT VFR BLD AUTO: 24.2 % (ref 37–47)
HGB BLD-MCNC: 7.2 G/DL (ref 12–16)
LYMPHOCYTES # BLD AUTO: 0.49 K/UL (ref 1–4.8)
LYMPHOCYTES NFR BLD: 5.3 % (ref 22–41)
MACROCYTES BLD QL SMEAR: ABNORMAL
MANUAL DIFF BLD: NORMAL
MCH RBC QN AUTO: 28.9 PG (ref 27–33)
MCHC RBC AUTO-ENTMCNC: 29.8 G/DL (ref 33.6–35)
MCV RBC AUTO: 97.2 FL (ref 81.4–97.8)
MICROCYTES BLD QL SMEAR: ABNORMAL
MONOCYTES # BLD AUTO: 0.41 K/UL (ref 0–0.85)
MONOCYTES NFR BLD AUTO: 4.4 % (ref 0–13.4)
MORPHOLOGY BLD-IMP: NORMAL
NEUTROPHILS # BLD AUTO: 8.32 K/UL (ref 2–7.15)
NEUTROPHILS NFR BLD: 87.7 % (ref 44–72)
NEUTS BAND NFR BLD MANUAL: 1.8 % (ref 0–10)
NRBC # BLD AUTO: 0 K/UL
NRBC BLD-RTO: 0 /100 WBC
NT-PROBNP SERPL IA-MCNC: 1540 PG/ML (ref 0–125)
OVALOCYTES BLD QL SMEAR: NORMAL
PLATELET # BLD AUTO: 235 K/UL (ref 164–446)
PLATELET BLD QL SMEAR: NORMAL
PMV BLD AUTO: 9 FL (ref 9–12.9)
POIKILOCYTOSIS BLD QL SMEAR: NORMAL
POLYCHROMASIA BLD QL SMEAR: NORMAL
POTASSIUM SERPL-SCNC: 4.5 MMOL/L (ref 3.6–5.5)
RBC # BLD AUTO: 2.49 M/UL (ref 4.2–5.4)
RBC BLD AUTO: PRESENT
SODIUM SERPL-SCNC: 140 MMOL/L (ref 135–145)
VIT B12 SERPL-MCNC: 668 PG/ML (ref 211–911)
WBC # BLD AUTO: 9.3 K/UL (ref 4.8–10.8)

## 2019-07-20 PROCEDURE — 700101 HCHG RX REV CODE 250: Performed by: HOSPITALIST

## 2019-07-20 PROCEDURE — G0378 HOSPITAL OBSERVATION PER HR: HCPCS

## 2019-07-20 PROCEDURE — 93971 EXTREMITY STUDY: CPT | Mod: 26,GZ | Performed by: INTERNAL MEDICINE

## 2019-07-20 PROCEDURE — 96368 THER/DIAG CONCURRENT INF: CPT

## 2019-07-20 PROCEDURE — 93971 EXTREMITY STUDY: CPT | Mod: LT

## 2019-07-20 PROCEDURE — 71045 X-RAY EXAM CHEST 1 VIEW: CPT

## 2019-07-20 PROCEDURE — 85007 BL SMEAR W/DIFF WBC COUNT: CPT

## 2019-07-20 PROCEDURE — 93005 ELECTROCARDIOGRAM TRACING: CPT | Performed by: EMERGENCY MEDICINE

## 2019-07-20 PROCEDURE — 96365 THER/PROPH/DIAG IV INF INIT: CPT

## 2019-07-20 PROCEDURE — 700105 HCHG RX REV CODE 258: Performed by: HOSPITALIST

## 2019-07-20 PROCEDURE — 96375 TX/PRO/DX INJ NEW DRUG ADDON: CPT

## 2019-07-20 PROCEDURE — 82962 GLUCOSE BLOOD TEST: CPT

## 2019-07-20 PROCEDURE — 700102 HCHG RX REV CODE 250 W/ 637 OVERRIDE(OP): Performed by: HOSPITALIST

## 2019-07-20 PROCEDURE — 82728 ASSAY OF FERRITIN: CPT

## 2019-07-20 PROCEDURE — 99220 PR INITIAL OBSERVATION CARE,LEVL III: CPT | Performed by: HOSPITALIST

## 2019-07-20 PROCEDURE — 304561 HCHG STAT O2

## 2019-07-20 PROCEDURE — 700111 HCHG RX REV CODE 636 W/ 250 OVERRIDE (IP): Performed by: HOSPITALIST

## 2019-07-20 PROCEDURE — 96372 THER/PROPH/DIAG INJ SC/IM: CPT

## 2019-07-20 PROCEDURE — 80048 BASIC METABOLIC PNL TOTAL CA: CPT

## 2019-07-20 PROCEDURE — 82607 VITAMIN B-12: CPT

## 2019-07-20 PROCEDURE — 94760 N-INVAS EAR/PLS OXIMETRY 1: CPT

## 2019-07-20 PROCEDURE — A9270 NON-COVERED ITEM OR SERVICE: HCPCS | Performed by: HOSPITALIST

## 2019-07-20 PROCEDURE — 82746 ASSAY OF FOLIC ACID SERUM: CPT

## 2019-07-20 PROCEDURE — 36415 COLL VENOUS BLD VENIPUNCTURE: CPT

## 2019-07-20 PROCEDURE — 99285 EMERGENCY DEPT VISIT HI MDM: CPT

## 2019-07-20 PROCEDURE — 94640 AIRWAY INHALATION TREATMENT: CPT

## 2019-07-20 PROCEDURE — 86140 C-REACTIVE PROTEIN: CPT

## 2019-07-20 PROCEDURE — 83880 ASSAY OF NATRIURETIC PEPTIDE: CPT

## 2019-07-20 PROCEDURE — 85027 COMPLETE CBC AUTOMATED: CPT

## 2019-07-20 RX ORDER — BISACODYL 10 MG
10 SUPPOSITORY, RECTAL RECTAL
Status: DISCONTINUED | OUTPATIENT
Start: 2019-07-20 | End: 2019-07-22 | Stop reason: HOSPADM

## 2019-07-20 RX ORDER — HEPARIN SODIUM 5000 [USP'U]/ML
5000 INJECTION, SOLUTION INTRAVENOUS; SUBCUTANEOUS EVERY 8 HOURS
Status: DISCONTINUED | OUTPATIENT
Start: 2019-07-20 | End: 2019-07-20

## 2019-07-20 RX ORDER — OXYCODONE HYDROCHLORIDE 5 MG/1
10 TABLET ORAL 3 TIMES DAILY PRN
Status: DISCONTINUED | OUTPATIENT
Start: 2019-07-20 | End: 2019-07-21

## 2019-07-20 RX ORDER — DILTIAZEM HYDROCHLORIDE 120 MG/1
120 CAPSULE, COATED, EXTENDED RELEASE ORAL DAILY
Status: DISCONTINUED | OUTPATIENT
Start: 2019-07-21 | End: 2019-07-22 | Stop reason: HOSPADM

## 2019-07-20 RX ORDER — AMITRIPTYLINE HYDROCHLORIDE 50 MG/1
50 TABLET, FILM COATED ORAL
Status: DISCONTINUED | OUTPATIENT
Start: 2019-07-20 | End: 2019-07-22 | Stop reason: HOSPADM

## 2019-07-20 RX ORDER — FLUTICASONE PROPIONATE 44 UG/1
2 AEROSOL, METERED RESPIRATORY (INHALATION)
Status: DISCONTINUED | OUTPATIENT
Start: 2019-07-21 | End: 2019-07-22 | Stop reason: HOSPADM

## 2019-07-20 RX ORDER — POLYETHYLENE GLYCOL 3350 17 G/17G
1 POWDER, FOR SOLUTION ORAL
Status: DISCONTINUED | OUTPATIENT
Start: 2019-07-20 | End: 2019-07-22 | Stop reason: HOSPADM

## 2019-07-20 RX ORDER — TRAZODONE HYDROCHLORIDE 150 MG/1
150 TABLET ORAL
Status: DISCONTINUED | OUTPATIENT
Start: 2019-07-20 | End: 2019-07-22 | Stop reason: HOSPADM

## 2019-07-20 RX ORDER — ROSUVASTATIN CALCIUM 10 MG/1
10 TABLET, COATED ORAL EVERY MORNING
Status: DISCONTINUED | OUTPATIENT
Start: 2019-07-21 | End: 2019-07-22 | Stop reason: HOSPADM

## 2019-07-20 RX ORDER — FUROSEMIDE 10 MG/ML
40 INJECTION INTRAMUSCULAR; INTRAVENOUS
Status: DISCONTINUED | OUTPATIENT
Start: 2019-07-20 | End: 2019-07-22 | Stop reason: HOSPADM

## 2019-07-20 RX ORDER — CYCLOBENZAPRINE HCL 10 MG
10 TABLET ORAL
Status: DISCONTINUED | OUTPATIENT
Start: 2019-07-20 | End: 2019-07-22 | Stop reason: HOSPADM

## 2019-07-20 RX ORDER — METHYLPREDNISOLONE SODIUM SUCCINATE 40 MG/ML
40 INJECTION, POWDER, LYOPHILIZED, FOR SOLUTION INTRAMUSCULAR; INTRAVENOUS EVERY 6 HOURS
Status: DISCONTINUED | OUTPATIENT
Start: 2019-07-20 | End: 2019-07-22 | Stop reason: HOSPADM

## 2019-07-20 RX ORDER — ONDANSETRON 2 MG/ML
4 INJECTION INTRAMUSCULAR; INTRAVENOUS EVERY 4 HOURS PRN
Status: DISCONTINUED | OUTPATIENT
Start: 2019-07-20 | End: 2019-07-22 | Stop reason: HOSPADM

## 2019-07-20 RX ORDER — IPRATROPIUM BROMIDE AND ALBUTEROL SULFATE 2.5; .5 MG/3ML; MG/3ML
3 SOLUTION RESPIRATORY (INHALATION)
Status: DISCONTINUED | OUTPATIENT
Start: 2019-07-20 | End: 2019-07-22 | Stop reason: HOSPADM

## 2019-07-20 RX ORDER — INSULIN GLARGINE 100 [IU]/ML
20 INJECTION, SOLUTION SUBCUTANEOUS NIGHTLY
Status: DISCONTINUED | OUTPATIENT
Start: 2019-07-20 | End: 2019-07-22 | Stop reason: HOSPADM

## 2019-07-20 RX ORDER — ONDANSETRON 4 MG/1
4 TABLET, ORALLY DISINTEGRATING ORAL EVERY 4 HOURS PRN
Status: DISCONTINUED | OUTPATIENT
Start: 2019-07-20 | End: 2019-07-22 | Stop reason: HOSPADM

## 2019-07-20 RX ORDER — AMOXICILLIN 250 MG
2 CAPSULE ORAL 2 TIMES DAILY
Status: DISCONTINUED | OUTPATIENT
Start: 2019-07-20 | End: 2019-07-22 | Stop reason: HOSPADM

## 2019-07-20 RX ORDER — LEVOTHYROXINE SODIUM 0.07 MG/1
75 TABLET ORAL
Status: DISCONTINUED | OUTPATIENT
Start: 2019-07-21 | End: 2019-07-22 | Stop reason: HOSPADM

## 2019-07-20 RX ORDER — ALBUTEROL SULFATE 90 UG/1
2 AEROSOL, METERED RESPIRATORY (INHALATION) EVERY 4 HOURS PRN
Status: DISCONTINUED | OUTPATIENT
Start: 2019-07-20 | End: 2019-07-22 | Stop reason: HOSPADM

## 2019-07-20 RX ORDER — DULOXETIN HYDROCHLORIDE 60 MG/1
60 CAPSULE, DELAYED RELEASE ORAL DAILY
Status: DISCONTINUED | OUTPATIENT
Start: 2019-07-21 | End: 2019-07-22 | Stop reason: HOSPADM

## 2019-07-20 RX ADMIN — AMITRIPTYLINE HYDROCHLORIDE 50 MG: 50 TABLET, FILM COATED ORAL at 22:43

## 2019-07-20 RX ADMIN — CYCLOBENZAPRINE HYDROCHLORIDE 10 MG: 10 TABLET, FILM COATED ORAL at 22:43

## 2019-07-20 RX ADMIN — IPRATROPIUM BROMIDE AND ALBUTEROL SULFATE 3 ML: .5; 3 SOLUTION RESPIRATORY (INHALATION) at 20:02

## 2019-07-20 RX ADMIN — TRAZODONE HYDROCHLORIDE 150 MG: 150 TABLET ORAL at 22:43

## 2019-07-20 RX ADMIN — METHYLPREDNISOLONE SODIUM SUCCINATE 40 MG: 40 INJECTION, POWDER, FOR SOLUTION INTRAMUSCULAR; INTRAVENOUS at 22:44

## 2019-07-20 RX ADMIN — FUROSEMIDE 40 MG: 10 INJECTION, SOLUTION INTRAMUSCULAR; INTRAVENOUS at 22:44

## 2019-07-20 RX ADMIN — CEFTRIAXONE SODIUM 2 G: 2 INJECTION, POWDER, FOR SOLUTION INTRAMUSCULAR; INTRAVENOUS at 20:40

## 2019-07-20 RX ADMIN — INSULIN GLARGINE 20 UNITS: 100 INJECTION, SOLUTION SUBCUTANEOUS at 23:40

## 2019-07-20 RX ADMIN — INSULIN HUMAN 1 UNITS: 100 INJECTION, SOLUTION PARENTERAL at 23:41

## 2019-07-20 RX ADMIN — OXYCODONE HYDROCHLORIDE 10 MG: 5 TABLET ORAL at 22:44

## 2019-07-20 RX ADMIN — IPRATROPIUM BROMIDE AND ALBUTEROL SULFATE 3 ML: .5; 3 SOLUTION RESPIRATORY (INHALATION) at 23:35

## 2019-07-20 RX ADMIN — DOXYCYCLINE 100 MG: 100 INJECTION, POWDER, LYOPHILIZED, FOR SOLUTION INTRAVENOUS at 20:37

## 2019-07-20 ASSESSMENT — ENCOUNTER SYMPTOMS
DIZZINESS: 0
DOUBLE VISION: 0
CHILLS: 0
HALLUCINATIONS: 0
EYE DISCHARGE: 0
ORTHOPNEA: 1
WEAKNESS: 1
ABDOMINAL PAIN: 0
SENSORY CHANGE: 0
SHORTNESS OF BREATH: 1
PALPITATIONS: 0
BRUISES/BLEEDS EASILY: 0
VOMITING: 0
FLANK PAIN: 0
NAUSEA: 0
COUGH: 0
FOCAL WEAKNESS: 0
HEARTBURN: 0
HEMOPTYSIS: 0
BLURRED VISION: 0
DEPRESSION: 0
SPEECH CHANGE: 0
FEVER: 0
MYALGIAS: 0

## 2019-07-20 ASSESSMENT — COPD QUESTIONNAIRES
DURING THE PAST 4 WEEKS HOW MUCH DID YOU FEEL SHORT OF BREATH: SOME OF THE TIME
HAVE YOU SMOKED AT LEAST 100 CIGARETTES IN YOUR ENTIRE LIFE: YES
COPD SCREENING SCORE: 9
DO YOU EVER COUGH UP ANY MUCUS OR PHLEGM?: YES, EVERY DAY

## 2019-07-20 ASSESSMENT — LIFESTYLE VARIABLES
SUBSTANCE_ABUSE: 0
EVER_SMOKED: YES
DO YOU DRINK ALCOHOL: NO

## 2019-07-21 PROBLEM — J96.22 ACUTE ON CHRONIC RESPIRATORY FAILURE WITH HYPOXIA AND HYPERCAPNIA (HCC): Status: ACTIVE | Noted: 2019-07-21

## 2019-07-21 PROBLEM — J96.21 ACUTE ON CHRONIC RESPIRATORY FAILURE WITH HYPOXIA AND HYPERCAPNIA (HCC): Status: ACTIVE | Noted: 2019-07-21

## 2019-07-21 LAB
ALBUMIN SERPL BCP-MCNC: 3.6 G/DL (ref 3.2–4.9)
ALBUMIN/GLOB SERPL: 1.3 G/DL
ALP SERPL-CCNC: 43 U/L (ref 30–99)
ALT SERPL-CCNC: 25 U/L (ref 2–50)
ANION GAP SERPL CALC-SCNC: 10 MMOL/L (ref 0–11.9)
AST SERPL-CCNC: 20 U/L (ref 12–45)
BASOPHILS # BLD AUTO: 0.3 % (ref 0–1.8)
BASOPHILS # BLD: 0.03 K/UL (ref 0–0.12)
BILIRUB SERPL-MCNC: 0.3 MG/DL (ref 0.1–1.5)
BUN SERPL-MCNC: 27 MG/DL (ref 8–22)
CALCIUM SERPL-MCNC: 9.2 MG/DL (ref 8.5–10.5)
CHLORIDE SERPL-SCNC: 99 MMOL/L (ref 96–112)
CO2 SERPL-SCNC: 28 MMOL/L (ref 20–33)
CREAT SERPL-MCNC: 2 MG/DL (ref 0.5–1.4)
EOSINOPHIL # BLD AUTO: 0.01 K/UL (ref 0–0.51)
EOSINOPHIL NFR BLD: 0.1 % (ref 0–6.9)
ERYTHROCYTE [DISTWIDTH] IN BLOOD BY AUTOMATED COUNT: 61.1 FL (ref 35.9–50)
GLOBULIN SER CALC-MCNC: 2.8 G/DL (ref 1.9–3.5)
GLUCOSE BLD-MCNC: 162 MG/DL (ref 65–99)
GLUCOSE BLD-MCNC: 259 MG/DL (ref 65–99)
GLUCOSE BLD-MCNC: 323 MG/DL (ref 65–99)
GLUCOSE BLD-MCNC: 366 MG/DL (ref 65–99)
GLUCOSE BLD-MCNC: 408 MG/DL (ref 65–99)
GLUCOSE BLD-MCNC: 409 MG/DL (ref 65–99)
GLUCOSE SERPL-MCNC: 290 MG/DL (ref 65–99)
HCT VFR BLD AUTO: 25 % (ref 37–47)
HGB BLD-MCNC: 7.4 G/DL (ref 12–16)
HGB BLD-MCNC: 7.4 G/DL (ref 12–16)
IMM GRANULOCYTES # BLD AUTO: 0.11 K/UL (ref 0–0.11)
IMM GRANULOCYTES NFR BLD AUTO: 1 % (ref 0–0.9)
LYMPHOCYTES # BLD AUTO: 0.29 K/UL (ref 1–4.8)
LYMPHOCYTES NFR BLD: 2.6 % (ref 22–41)
MCH RBC QN AUTO: 28.5 PG (ref 27–33)
MCHC RBC AUTO-ENTMCNC: 29.6 G/DL (ref 33.6–35)
MCV RBC AUTO: 96.2 FL (ref 81.4–97.8)
MONOCYTES # BLD AUTO: 0.1 K/UL (ref 0–0.85)
MONOCYTES NFR BLD AUTO: 0.9 % (ref 0–13.4)
NEUTROPHILS # BLD AUTO: 10.83 K/UL (ref 2–7.15)
NEUTROPHILS NFR BLD: 95.1 % (ref 44–72)
NRBC # BLD AUTO: 0 K/UL
NRBC BLD-RTO: 0 /100 WBC
PLATELET # BLD AUTO: 223 K/UL (ref 164–446)
PMV BLD AUTO: 9.1 FL (ref 9–12.9)
POTASSIUM SERPL-SCNC: 4.8 MMOL/L (ref 3.6–5.5)
PROT SERPL-MCNC: 6.4 G/DL (ref 6–8.2)
RBC # BLD AUTO: 2.6 M/UL (ref 4.2–5.4)
SODIUM SERPL-SCNC: 137 MMOL/L (ref 135–145)
WBC # BLD AUTO: 11.4 K/UL (ref 4.8–10.8)

## 2019-07-21 PROCEDURE — A9270 NON-COVERED ITEM OR SERVICE: HCPCS | Performed by: HOSPITALIST

## 2019-07-21 PROCEDURE — 99233 SBSQ HOSP IP/OBS HIGH 50: CPT | Performed by: HOSPITALIST

## 2019-07-21 PROCEDURE — 96366 THER/PROPH/DIAG IV INF ADDON: CPT

## 2019-07-21 PROCEDURE — 700111 HCHG RX REV CODE 636 W/ 250 OVERRIDE (IP): Performed by: HOSPITALIST

## 2019-07-21 PROCEDURE — 99406 BEHAV CHNG SMOKING 3-10 MIN: CPT

## 2019-07-21 PROCEDURE — 96372 THER/PROPH/DIAG INJ SC/IM: CPT

## 2019-07-21 PROCEDURE — 85025 COMPLETE CBC W/AUTO DIFF WBC: CPT

## 2019-07-21 PROCEDURE — 700101 HCHG RX REV CODE 250: Performed by: HOSPITALIST

## 2019-07-21 PROCEDURE — 36415 COLL VENOUS BLD VENIPUNCTURE: CPT

## 2019-07-21 PROCEDURE — 80053 COMPREHEN METABOLIC PANEL: CPT

## 2019-07-21 PROCEDURE — 700102 HCHG RX REV CODE 250 W/ 637 OVERRIDE(OP): Performed by: HOSPITALIST

## 2019-07-21 PROCEDURE — 94760 N-INVAS EAR/PLS OXIMETRY 1: CPT

## 2019-07-21 PROCEDURE — 770020 HCHG ROOM/CARE - TELE (206)

## 2019-07-21 PROCEDURE — 85018 HEMOGLOBIN: CPT

## 2019-07-21 PROCEDURE — 96376 TX/PRO/DX INJ SAME DRUG ADON: CPT

## 2019-07-21 PROCEDURE — 82962 GLUCOSE BLOOD TEST: CPT | Mod: 91

## 2019-07-21 PROCEDURE — 700105 HCHG RX REV CODE 258: Performed by: HOSPITALIST

## 2019-07-21 PROCEDURE — 94640 AIRWAY INHALATION TREATMENT: CPT

## 2019-07-21 PROCEDURE — 700105 HCHG RX REV CODE 258

## 2019-07-21 RX ORDER — SODIUM CHLORIDE 9 MG/ML
INJECTION, SOLUTION INTRAVENOUS
Status: COMPLETED
Start: 2019-07-21 | End: 2019-07-21

## 2019-07-21 RX ORDER — DOXYCYCLINE 100 MG/1
100 TABLET ORAL EVERY 12 HOURS
Status: DISCONTINUED | OUTPATIENT
Start: 2019-07-21 | End: 2019-07-22 | Stop reason: HOSPADM

## 2019-07-21 RX ORDER — MORPHINE SULFATE 4 MG/ML
2 INJECTION, SOLUTION INTRAMUSCULAR; INTRAVENOUS ONCE
Status: DISCONTINUED | OUTPATIENT
Start: 2019-07-21 | End: 2019-07-22 | Stop reason: HOSPADM

## 2019-07-21 RX ORDER — OXYCODONE HYDROCHLORIDE 10 MG/1
10 TABLET ORAL 3 TIMES DAILY PRN
Status: DISCONTINUED | OUTPATIENT
Start: 2019-07-21 | End: 2019-07-22 | Stop reason: HOSPADM

## 2019-07-21 RX ADMIN — UMECLIDINIUM BROMIDE AND VILANTEROL TRIFENATATE 1 PUFF: 62.5; 25 POWDER RESPIRATORY (INHALATION) at 07:20

## 2019-07-21 RX ADMIN — LEVOTHYROXINE SODIUM 75 MCG: 75 TABLET ORAL at 06:06

## 2019-07-21 RX ADMIN — METHYLPREDNISOLONE SODIUM SUCCINATE 40 MG: 40 INJECTION, POWDER, FOR SOLUTION INTRAMUSCULAR; INTRAVENOUS at 17:29

## 2019-07-21 RX ADMIN — INSULIN HUMAN 10 UNITS: 100 INJECTION, SOLUTION PARENTERAL at 23:26

## 2019-07-21 RX ADMIN — DULOXETINE HYDROCHLORIDE 60 MG: 60 CAPSULE, DELAYED RELEASE ORAL at 06:06

## 2019-07-21 RX ADMIN — DILTIAZEM HYDROCHLORIDE 120 MG: 120 CAPSULE, COATED, EXTENDED RELEASE ORAL at 06:05

## 2019-07-21 RX ADMIN — IPRATROPIUM BROMIDE AND ALBUTEROL SULFATE 3 ML: .5; 3 SOLUTION RESPIRATORY (INHALATION) at 18:00

## 2019-07-21 RX ADMIN — CEFTRIAXONE SODIUM 2 G: 2 INJECTION, POWDER, FOR SOLUTION INTRAMUSCULAR; INTRAVENOUS at 21:31

## 2019-07-21 RX ADMIN — METHYLPREDNISOLONE SODIUM SUCCINATE 40 MG: 40 INJECTION, POWDER, FOR SOLUTION INTRAMUSCULAR; INTRAVENOUS at 06:05

## 2019-07-21 RX ADMIN — METHYLPREDNISOLONE SODIUM SUCCINATE 40 MG: 40 INJECTION, POWDER, FOR SOLUTION INTRAMUSCULAR; INTRAVENOUS at 11:47

## 2019-07-21 RX ADMIN — AMITRIPTYLINE HYDROCHLORIDE 50 MG: 50 TABLET, FILM COATED ORAL at 21:28

## 2019-07-21 RX ADMIN — FUROSEMIDE 40 MG: 10 INJECTION, SOLUTION INTRAMUSCULAR; INTRAVENOUS at 06:05

## 2019-07-21 RX ADMIN — IPRATROPIUM BROMIDE AND ALBUTEROL SULFATE 3 ML: .5; 3 SOLUTION RESPIRATORY (INHALATION) at 22:32

## 2019-07-21 RX ADMIN — ROSUVASTATIN CALCIUM 10 MG: 10 TABLET, FILM COATED ORAL at 06:05

## 2019-07-21 RX ADMIN — INSULIN GLARGINE 20 UNITS: 100 INJECTION, SOLUTION SUBCUTANEOUS at 21:00

## 2019-07-21 RX ADMIN — OXYCODONE HYDROCHLORIDE 10 MG: 10 TABLET ORAL at 15:42

## 2019-07-21 RX ADMIN — TRAZODONE HYDROCHLORIDE 150 MG: 150 TABLET ORAL at 21:29

## 2019-07-21 RX ADMIN — IPRATROPIUM BROMIDE AND ALBUTEROL SULFATE 3 ML: .5; 3 SOLUTION RESPIRATORY (INHALATION) at 15:31

## 2019-07-21 RX ADMIN — FUROSEMIDE 40 MG: 10 INJECTION, SOLUTION INTRAMUSCULAR; INTRAVENOUS at 15:41

## 2019-07-21 RX ADMIN — INSULIN HUMAN 5 UNITS: 100 INJECTION, SOLUTION PARENTERAL at 13:12

## 2019-07-21 RX ADMIN — SODIUM CHLORIDE: 9 INJECTION, SOLUTION INTRAVENOUS at 21:30

## 2019-07-21 RX ADMIN — INSULIN HUMAN 3 UNITS: 100 INJECTION, SOLUTION PARENTERAL at 08:29

## 2019-07-21 RX ADMIN — CYCLOBENZAPRINE HYDROCHLORIDE 10 MG: 10 TABLET, FILM COATED ORAL at 21:28

## 2019-07-21 RX ADMIN — OXYCODONE HYDROCHLORIDE 10 MG: 10 TABLET ORAL at 21:28

## 2019-07-21 RX ADMIN — INSULIN HUMAN 4 UNITS: 100 INJECTION, SOLUTION PARENTERAL at 17:36

## 2019-07-21 RX ADMIN — IPRATROPIUM BROMIDE AND ALBUTEROL SULFATE 3 ML: .5; 3 SOLUTION RESPIRATORY (INHALATION) at 07:18

## 2019-07-21 RX ADMIN — ENOXAPARIN SODIUM 40 MG: 100 INJECTION SUBCUTANEOUS at 15:41

## 2019-07-21 RX ADMIN — SENNOSIDES, DOCUSATE SODIUM 2 TABLET: 50; 8.6 TABLET, FILM COATED ORAL at 17:29

## 2019-07-21 RX ADMIN — DOXYCYCLINE 100 MG: 100 TABLET, FILM COATED ORAL at 17:29

## 2019-07-21 RX ADMIN — DOXYCYCLINE 100 MG: 100 INJECTION, POWDER, LYOPHILIZED, FOR SOLUTION INTRAVENOUS at 06:05

## 2019-07-21 ASSESSMENT — ENCOUNTER SYMPTOMS
TINGLING: 0
DEPRESSION: 0
VOMITING: 0
NERVOUS/ANXIOUS: 0
TREMORS: 0
SEIZURES: 0
BACK PAIN: 0
PALPITATIONS: 0
COUGH: 0
FEVER: 0
BLURRED VISION: 0
WEAKNESS: 1
ABDOMINAL PAIN: 0
EYE REDNESS: 0
CHILLS: 0
EYE DISCHARGE: 0
DOUBLE VISION: 0
MYALGIAS: 0
SHORTNESS OF BREATH: 1

## 2019-07-21 ASSESSMENT — COGNITIVE AND FUNCTIONAL STATUS - GENERAL
CLIMB 3 TO 5 STEPS WITH RAILING: A LITTLE
MOVING TO AND FROM BED TO CHAIR: A LITTLE
DRESSING REGULAR LOWER BODY CLOTHING: A LITTLE
TOILETING: A LOT
MOVING FROM LYING ON BACK TO SITTING ON SIDE OF FLAT BED: A LITTLE
PERSONAL GROOMING: A LITTLE
HELP NEEDED FOR BATHING: A LITTLE
STANDING UP FROM CHAIR USING ARMS: A LITTLE
SUGGESTED CMS G CODE MODIFIER MOBILITY: CK
DAILY ACTIVITIY SCORE: 18
DRESSING REGULAR UPPER BODY CLOTHING: A LITTLE
SUGGESTED CMS G CODE MODIFIER DAILY ACTIVITY: CK
WALKING IN HOSPITAL ROOM: A LITTLE
MOBILITY SCORE: 19

## 2019-07-21 ASSESSMENT — LIFESTYLE VARIABLES: SUBSTANCE_ABUSE: 0

## 2019-07-21 NOTE — ED TRIAGE NOTES
BIBA with report of   Chief Complaint   Patient presents with   • Leg Pain     past 4 days.  also reports swelling for past 5 weeks.  LLE with 2+ pitting edema, warm to touch, discoloration (red), and painful to palp.  2+ pedal pulse with rapid cap refill.  reports possible fever.

## 2019-07-21 NOTE — H&P
Hospital Medicine History & Physical Note    Date of Service  7/20/2019    Primary Care Physician  Kavitha Mccall M.D.    Consultants  none    Code Status  full    Chief Complaint  Shortness of breath, left lower extremity erythema and swelling    History of Presenting Illness  68 y.o. female who presented 7/20/2019 with Past medical history of morbid obesity, EDITH on CPAP machine, COPD on chronic 4 L of oxygen at 6 L with exertion, diabetes, hypertension who presents with left lower extremity erythema and swelling.  This patient has had left leg pain which gradually worsened over the last several weeks.  Her home health nurse evaluated her leg and was concerned that she may have a blood clot.  She presented to the emergency department having subjective fevers.  She is also had erythema and severe tenderness to her left lower extremity with mild swelling.  She had a ultrasound that ruled out possible DVT.  She does look like she has left lower extremity cellulitis.  However she is noticed worsening dyspnea with exertion.  She will be admitted to the hospital for further management of her symptoms.    Review of Systems  Review of Systems   Constitutional: Negative for chills and fever.   HENT: Negative for congestion, hearing loss and tinnitus.    Eyes: Negative for blurred vision, double vision and discharge.   Respiratory: Positive for shortness of breath. Negative for cough and hemoptysis.    Cardiovascular: Positive for orthopnea and leg swelling. Negative for chest pain and palpitations.   Gastrointestinal: Negative for abdominal pain, heartburn, nausea and vomiting.   Genitourinary: Negative for dysuria and flank pain.   Musculoskeletal: Negative for joint pain and myalgias.   Skin: Negative for rash.   Neurological: Positive for weakness. Negative for dizziness, sensory change, speech change and focal weakness.   Endo/Heme/Allergies: Negative for environmental allergies. Does not bruise/bleed easily.    Psychiatric/Behavioral: Negative for depression, hallucinations and substance abuse.       Past Medical History   has a past medical history of Arthritis; ASTHMA; Backpain; Breath shortness; Bronchitis; Congestive heart failure ([facility]) (2013); COPD; Diabetes; Disorder of thyroid; Fall; Fibromyalgia; GERD (gastroesophageal reflux disease); Heart burn; Hepatitis C; Hypertension; Indigestion; Infectious disease; Neuropathy (HCC); On home oxygen therapy; Other specified symptom associated with female genital organs; Pain (9/13/2016); PND (post-nasal drip); Pneumonia; Psychiatric problem (9/13/2016); RLS (restless legs syndrome); Sleep apnea; and Unspecified urinary incontinence.    Surgical History   has a past surgical history that includes gyn surgery; other orthopedic surgery; other orthopedic surgery; us-needle core bx-breast panel; lumpectomy (Left); pr inj dx/ther agnt paravert facet joint, bruce* (Left, 7/10/2015); pr inj dx/ther agnt paravert facet joint, bruce* (7/10/2015); pr inj dx/ther agnt paravert facet joint, bruce* (7/10/2015); pr inject nerv blck,othr periph nerv (7/10/2015); pr inj dx/ther agnt paravert facet joint, bruce* (Left, 7/17/2015); pr inj dx/ther agnt paravert facet joint, bruce* (7/17/2015); pr inj dx/ther agnt paravert facet joint, bruce* (7/17/2015); pr inject nerv blck,othr periph nerv (7/17/2015); pr dstr nrolytc agnt parverteb fct sngl lmbr/sacral (Left, 10/2/2015); pr dstr nrolytc agnt parverteb fct addl lmbr/sacral (10/2/2015); pr inject rx other periph nerve (10/2/2015); pr dstr nrolytc agnt parverteb fct addl lmbr/sacral (10/2/2015); pr dstr nrolytc agnt parverteb fct sngl lmbr/sacral (Right, 11/6/2015); pr dstr nrolytc agnt parverteb fct addl lmbr/sacral (11/6/2015); pr inject rx other periph nerve (11/6/2015); pr dstr nrolytc agnt parverteb fct addl lmbr/sacral (11/6/2015); pr dstr nrolytc agnt parverteb fct sngl lmbr/sacral (Left, 9/16/2016); pr dstr nrolytc agnt parverteb fct addl lmbr/sacral  "(9/16/2016); pr dstr nrolytc agnt parverteb fct addl lmbr/sacral (9/16/2016); pr fluoroscopic guidance needle placement (9/16/2016); and ankle orif (Left, 5/8/2017).     Family History  family history includes Alcohol/Drug in her mother; Cancer in her brother and father; Diabetes in her brother and maternal grandmother; Heart Disease in her mother and paternal grandmother; Lung Disease in her mother; Stroke in her paternal grandmother.     Social History   reports that she quit smoking about 4 months ago. Her smoking use included Cigarettes. She has a 50.00 pack-year smoking history. She has never used smokeless tobacco. She reports that she does not drink alcohol or use drugs.    Allergies  Allergies   Allergen Reactions   • Vitamin C Diarrhea     \"violent diarrhea\"   • Codeine Nausea     Severe stomach pain   • Gabapentin Palpitations     Gets shaky and falls    • Keflex Rash     Severe rash, but TOLERATES PENICILLINS, Rocephin    • Lyrica Nausea     Nausea, dizzy   • Powders    • Sudafed Nausea and Unspecified     Chest pain, nausea       Medications  Prior to Admission Medications   Prescriptions Last Dose Informant Patient Reported? Taking?   DILTIAZem CD (CARDIZEM CD) 120 MG CAPSULE SR 24 HR 7/20/2019 at Unknown time  No No   Sig: Take 1 Cap by mouth every day.   DULoxetine (CYMBALTA) 60 MG Cap DR Particles delayed-release capsule 7/20/2019 at Unknown time Patient No No   Sig: TAKE 1 CAPSULE BY MOUTH DAILY   Fluticasone-Umeclidin-Vilant (TRELEGY ELLIPTA) 100-62.5-25 MCG/INH AEROSOL POWDER, BREATH ACTIVATED 7/20/2019 at Unknown time Patient Yes No   Sig: Inhale 1 Puff by mouth every day.   LACTULOSE PO PRN  Yes No   Sig: Take  by mouth as needed.   albuterol 108 (90 Base) MCG/ACT Aero Soln inhalation aerosol 7/19/2019 at Unknown time Patient Yes No   Sig: Inhale 2 Puffs by mouth every four hours as needed for Shortness of Breath.   amitriptyline (ELAVIL) 50 MG Tab 7/19/2019 at Unknown time Patient Yes No   Sig: " Take 50 mg by mouth every bedtime.   calcipotriene (DOVONEX) 0.005 % Cream   No No   Sig: APPLY 1-2 GRAMS TOPICALLY TO AFFECTED AREA 1-2 TIMES A DAY. DO NOT APPLY TO FACE   cyclobenzaprine (FLEXERIL) 10 MG Tab 7/19/2019 at Unknown time Patient Yes No   Sig: Take 10 mg by mouth every bedtime.   ferrous gluconate (FERGON) 324 (38 Fe) MG Tab 7/20/2019 at Unknown time  No No   Sig: Take 1 Tab by mouth every morning with breakfast.   furosemide (LASIX) 40 MG Tab 7/20/2019 at Unknown time  No No   Sig: Take 1 Tab by mouth every day.   insulin glargine (LANTUS) 100 UNIT/ML Solution 7/19/2019 at Unknown time Patient Yes No   Sig: Inject 20 Units as instructed every evening.   ipratropium-albuterol (DUONEB) 0.5-2.5 (3) MG/3ML nebulizer solution 7/19/2019 at Unknown time  No No   Sig: 3 mL by Nebulization route every 6 hours as needed for Shortness of Breath.   levothyroxine (SYNTHROID) 75 MCG Tab 7/20/2019 at Unknown time Patient No No   Sig: TAKE 1 TABLET BY MOUTH DAILY   oxyCODONE immediate release (ROXICODONE) 10 MG immediate release tablet 7/19/2019 at Unknown time  No No   Sig: Take 1 Tab by mouth 3 times a day as needed for Moderate Pain for up to 30 days.   potassium chloride SA (KDUR) 20 MEQ Tab CR 7/20/2019 at Unknown time  No No   Sig: Take 1 Tab by mouth every day.   rosuvastatin (CRESTOR) 10 MG Tab 7/20/2019 at Unknown time Patient Yes No   Sig: Take 10 mg by mouth every morning.   traZODone (DESYREL) 150 MG Tab 7/19/2019 at Unknown time Patient Yes No   Sig: Take 150 mg by mouth every bedtime.      Facility-Administered Medications: None       Physical Exam  Temp:  [36.8 °C (98.2 °F)] 36.8 °C (98.2 °F)  Pulse:  [63-73] 68  Resp:  [16] 16  SpO2:  [85 %-96 %] 92 %    Physical Exam   Constitutional: She is oriented to person, place, and time. She appears well-developed and well-nourished. She appears distressed.   HENT:   Head: Normocephalic and atraumatic.   Eyes: Pupils are equal, round, and reactive to light.  Conjunctivae and EOM are normal.   Neck: Normal range of motion. Neck supple. No JVD present.   Cardiovascular: Normal rate, regular rhythm, normal heart sounds and intact distal pulses.    No murmur heard.  Pulmonary/Chest: Effort normal. No respiratory distress. She has wheezes.   Abdominal: Soft. Bowel sounds are normal. She exhibits no distension. There is no tenderness.   Musculoskeletal: Normal range of motion. She exhibits edema.   Severe ttp with left lower extremity with associated edema and erythema    Neurological: She is alert and oriented to person, place, and time. She exhibits normal muscle tone.   Skin: Skin is warm and dry. There is erythema.   Left lower extremity erythema    Psychiatric: She has a normal mood and affect. Her behavior is normal. Judgment and thought content normal.   Nursing note and vitals reviewed.      Laboratory:  Recent Labs      07/20/19   1740   WBC  9.3   RBC  2.49*   HEMOGLOBIN  7.2*   HEMATOCRIT  24.2*   MCV  97.2   MCH  28.9   MCHC  29.8*   RDW  61.7*   PLATELETCT  235   MPV  9.0     Recent Labs      07/20/19   1740   SODIUM  140   POTASSIUM  4.5   CHLORIDE  104   CO2  28   GLUCOSE  158*   BUN  29*   CREATININE  2.05*   CALCIUM  8.9     Recent Labs      07/20/19   1740   GLUCOSE  158*         Recent Labs      07/20/19   1740   NTPROBNP  1540*         No results for input(s): TROPONINT in the last 72 hours.    Urinalysis:    No results found     Imaging:  US-EXTREMITY VENOUS LOWER UNILAT LEFT   Final Result      DX-CHEST-PORTABLE (1 VIEW)   Final Result      Cardiomegaly and mild bibasilar interstitial prominence, left worse than right, likely mild edema.            Assessment/Plan:  I anticipate this patient is appropriate for observation status at this time.    * Acute on chronic respiratory failure with hypoxia (HCC)   Assessment & Plan    She has had hypoxia and desaturation in the ER with exertion  At baseline she is 4L rest and 6L with exertion  I suspect this is  multifactorial due to COPD and mild volume overload   Will admit for IV steroids and IV diuresis      Cellulitis of left leg   Assessment & Plan    Leg is tender to palpation Erythematous and edematous   DVT ultrasound unremarkable   Will admit for IV abx   montior for clinical improvement        CHF (congestive heart failure) ([facility])- (present on admission)   Assessment & Plan    She appears mildly overloaded on exam and some pulmonary edema and elevated BNP   Will increase home lasix to 40 BID for now   Admit to tele for monitoring     DM type 2, uncontrolled, with renal complications ([facility])- (present on admission)   Assessment & Plan    SSI ordered  Resume home lantus   accu checks        CKD (chronic kidney disease)   Assessment & Plan    Appears to be at baseline   Monitor renal function closely   Assess response to IV lasix   Avoid nephrotoxic medications      COPD exacerbation ([facility])   Assessment & Plan    Appears to be in mild COPD exacerbation with dyspnea on exertion   Admit for IV steroids   Duo nebs   resp care protocol   Wean 02 to baseline as tolerated     Anemia- (present on admission)   Assessment & Plan    Requiring transfusions in the past   She states she has bleeding hemorrhoids  Lab workup ordered  Occult blood stool   Serial hgb transfuse as clinically appropriate  Consider GI consult in the am     Class 3 severe obesity due to excess calories with serious comorbidity and body mass index (BMI) of 40.0 to 44.9 in adult ([facility])- (present on admission)   Assessment & Plan    Encouraged weight loss     EDITH (obstructive sleep apnea)- (present on admission)   Assessment & Plan    cpap ordered     Hypothyroidism- (present on admission)   Assessment & Plan    Resume home levothyroxine          VTE prophylaxis: scd

## 2019-07-21 NOTE — ASSESSMENT & PLAN NOTE
Elevated BNP,CXR showing mild interstitial edema , reviewed personally by me  + ANASTASIA edema  Lasix 40mg BID  Monitor renal function closely as Cr already worse   tele for monitoring

## 2019-07-21 NOTE — ASSESSMENT & PLAN NOTE
She has had hypoxia and desaturation in the ER with exertion  At baseline she is 4L rest and 6L with exertion  I suspect this is multifactorial due to COPD and mild volume overload   Will admit for IV steroids and IV diuresis

## 2019-07-21 NOTE — RESPIRATORY CARE
COPD EDUCATION by COPD CLINICAL EDUCATOR  7/21/2019 at 9:01 AM by Cristela Martinez     Patient interviewed by COPD education team. Patient refused COPD program at this time.

## 2019-07-21 NOTE — ASSESSMENT & PLAN NOTE
Appears to be in mild acute COPD exacerbation with dyspnea on exertion   Continue IV steroids - transition to oral tomorrow  Ramon nebs   resp care protocol   Wean 02 to baseline as tolerated

## 2019-07-21 NOTE — CARE PLAN
Problem: Safety  Goal: Will remain free from falls  Outcome: PROGRESSING AS EXPECTED  Pt mobility assessed at beginning of shift. Pt is standby assist. Fall precautions in place. Non-slip socks on. Bed in lowest locked position. Bed alarm on. Call light within reach. Pt educated to call for assistance and verbalizes understanding.    Problem: Infection  Goal: Will remain free from infection  Outcome: PROGRESSING AS EXPECTED  PT educated about hand hygiene and oral care in order to prevent and/or decrease the risk of infection

## 2019-07-21 NOTE — PROGRESS NOTES
2 RN skin check complete Jill.   Devices in place NC, adela.  Skin assessed under devices Yes.  Confirmed pressure ulcers found on NA  New potential pressure ulcers noted on NA. Wound consult placed NA  The following interventions in place: linens kept dry, ambulation encouraged, pills to float heels.     Patient is here for cellulitis in E. Leg is red, warm to touch, some scabbing, no drainage or weeping. Topical applied to both lower extremities for pain.     Patient has a rash under panus and both breasts, likely fungal/yeast. Interdri placed.

## 2019-07-21 NOTE — ASSESSMENT & PLAN NOTE
Requiring transfusions in the past   She states she has bleeding hemorrhoids  Lab workup ordered and + low iron  Occult blood stool pending  Serial hgb transfuse as clinically appropriate  Will start on iv iron and consult gi if stool occult is +

## 2019-07-21 NOTE — PROGRESS NOTES
Hospital Medicine Daily Progress Note    Date of Service  7/21/2019    Chief Complaint  68 y.o. female admitted 7/20/2019 with left lower extremity erythema and swelling.   Hospital Course    68 y.o. female who presented 7/20/2019 with Past medical history of morbid obesity, EDITH on CPAP machine, COPD on chronic 4 L of oxygen at 6 L with exertion, diabetes, hypertension who presents with left lower extremity erythema and swelling.  This patient has had left leg pain which gradually worsened over the last several weeks.  Her home health nurse evaluated her leg and was concerned that she may have a blood clot.  She presented to the emergency department having subjective fevers.  She is also had erythema and severe tenderness to her left lower extremity with mild swelling.  She had a ultrasound that ruled out possible DVT.  She does look like she has left lower extremity cellulitis.  However she is noticed worsening dyspnea with exertion.  She will be admitted to the hospital for further management of her symptoms.      Interval Problem Update  Seen and examined, patient is still c/o left lower extremity pain and swelling along with some redness, she states she has had this before when she gets edema in her legs, however the left one always turns more painful and red  Otherwise she is having some more sob than usually, she is on home 2 usually, however increased in the hospital      Consultants/Specialty  none    Code Status  full    Disposition  tbd    Review of Systems  Review of Systems   Constitutional: Positive for malaise/fatigue. Negative for chills and fever.   HENT: Negative for congestion, ear pain and hearing loss.    Eyes: Negative for blurred vision, double vision, discharge and redness.   Respiratory: Positive for shortness of breath. Negative for cough.    Cardiovascular: Positive for leg swelling. Negative for chest pain and palpitations.   Gastrointestinal: Negative for abdominal pain and vomiting.    Genitourinary: Negative for dysuria, frequency and urgency.   Musculoskeletal: Negative for back pain, joint pain and myalgias.   Skin: Negative for itching and rash.   Neurological: Positive for weakness. Negative for tingling, tremors and seizures.   Psychiatric/Behavioral: Negative for depression and substance abuse. The patient is not nervous/anxious.         Physical Exam  Temp:  [35.9 °C (96.7 °F)-36.8 °C (98.2 °F)] 36.3 °C (97.3 °F)  Pulse:  [59-80] 73  Resp:  [16-20] 17  BP: ()/(43-66) 144/65  SpO2:  [85 %-97 %] 95 %    Physical Exam   Constitutional: She is oriented to person, place, and time. She appears well-developed and well-nourished. No distress.   HENT:   Head: Normocephalic and atraumatic.   Mouth/Throat: Oropharynx is clear and moist. No oropharyngeal exudate.   Eyes: Pupils are equal, round, and reactive to light. Conjunctivae and EOM are normal. No scleral icterus.   Neck: Normal range of motion. Neck supple. No JVD present. No thyromegaly present.   Cardiovascular: Normal rate, regular rhythm and intact distal pulses.    No murmur heard.  Pulmonary/Chest: Effort normal. No respiratory distress. She has no wheezes. She has no rales.   Abdominal: Soft. Bowel sounds are normal. She exhibits no distension. There is no tenderness. There is no rebound.   Musculoskeletal: Normal range of motion. She exhibits edema and tenderness.   Lymphadenopathy:     She has no cervical adenopathy.   Neurological: She is alert and oriented to person, place, and time. She exhibits normal muscle tone.   Skin: Skin is warm and dry. No rash noted. No erythema.   Psychiatric: She has a normal mood and affect. Her behavior is normal.       Fluids    Intake/Output Summary (Last 24 hours) at 07/21/19 1422  Last data filed at 07/21/19 1000   Gross per 24 hour   Intake             1000 ml   Output             1250 ml   Net             -250 ml       Laboratory  Recent Labs      07/20/19   1740  07/21/19   9303   07/21/19   1237   WBC  9.3  11.4*   --    RBC  2.49*  2.60*   --    HEMOGLOBIN  7.2*  7.4*  7.4*   HEMATOCRIT  24.2*  25.0*   --    MCV  97.2  96.2   --    MCH  28.9  28.5   --    MCHC  29.8*  29.6*   --    RDW  61.7*  61.1*   --    PLATELETCT  235  223   --    MPV  9.0  9.1   --      Recent Labs      07/20/19   1740  07/21/19   0454   SODIUM  140  137   POTASSIUM  4.5  4.8   CHLORIDE  104  99   CO2  28  28   GLUCOSE  158*  290*   BUN  29*  27*   CREATININE  2.05*  2.00*   CALCIUM  8.9  9.2                   Imaging  US-EXTREMITY VENOUS LOWER UNILAT LEFT   Final Result      DX-CHEST-PORTABLE (1 VIEW)   Final Result      Cardiomegaly and mild bibasilar interstitial prominence, left worse than right, likely mild edema.           Assessment/Plan  * Acute on chronic combined systolic and diastolic congestive heart failure (HCC)- (present on admission)   Assessment & Plan    Elevated BNP,CXR showing mild interstitial edema , reviewed personally by me  + LE edema  Lasix 40mg BID  Monitor renal function closely as Cr already worse   tele for monitoring     Cellulitis of left leg   Assessment & Plan    Leg is tender to palpation Erythematous and edematous   DVT ultrasound unremarkable   Continue iv abx  montior for clinical improvement        DM type 2, uncontrolled, with renal complications (HCC)- (present on admission)   Assessment & Plan    SSI ordered  Resume home lantus   accu checks        Acute on chronic respiratory failure with hypoxia and hypercapnia ([facility])   Assessment & Plan    Patient coming in with increase SOB and O2 requirement, on 5L  Persistent, with some sob symptoms  Likely 2/2 copd exacerbation and volume overload  Treat copd and increase lasix for volume overload.     Monitor  Wean o2 as tolerated     CKD (chronic kidney disease)   Assessment & Plan    Appears to be at baseline   Monitor renal function closely   Assess response to IV lasix   Avoid nephrotoxic medications      COPD exacerbation (HCC)    Assessment & Plan    Appears to be in mild acute COPD exacerbation with dyspnea on exertion   Continue IV steroids - transition to oral tomorrow  Duo nebs   resp care protocol   Wean 02 to baseline as tolerated     Acute on chronic respiratory failure with hypoxia (HCC)   Assessment & Plan    She has had hypoxia and desaturation in the ER with exertion  At baseline she is 4L rest and 6L with exertion  I suspect this is multifactorial due to COPD and mild volume overload   Will admit for IV steroids and IV diuresis      Anemia- (present on admission)   Assessment & Plan    Requiring transfusions in the past   She states she has bleeding hemorrhoids  Lab workup ordered and + low iron  Occult blood stool pending  Serial hgb transfuse as clinically appropriate  Will start on iv iron and consult gi if stool occult is +     Class 3 severe obesity due to excess calories with serious comorbidity and body mass index (BMI) of 40.0 to 44.9 in adult ([facility])- (present on admission)   Assessment & Plan    Encouraged weight loss     EDITH (obstructive sleep apnea)- (present on admission)   Assessment & Plan    cpap ordered     Hypothyroidism- (present on admission)   Assessment & Plan    Resume home levothyroxine           VTE prophylaxis: lovenox

## 2019-07-21 NOTE — ASSESSMENT & PLAN NOTE
Patient coming in with increase SOB and O2 requirement, on 5L  Persistent, with some sob symptoms  Likely 2/2 copd exacerbation and volume overload  Treat copd and increase lasix for volume overload.     Monitor  Wean o2 as tolerated

## 2019-07-21 NOTE — PROGRESS NOTES
Assumed care of PT from ED, A&O 4. Pt resting in bed with no signs of labored breathing. On 5L NC. Tele monitor in place, cardiac rhythm being monitored. Call light within reach, bed in lowest position, upper bed rails up. Pt was updated on plan of care for the night. Will continue to monitor.

## 2019-07-21 NOTE — ED PROVIDER NOTES
ER Provider Note     Scribed for Angel Davila M.D. by Nguyen Love. 7/20/2019, 5:23 PM.    Primary Care Provider: Kavitha Mccall M.D.  Means of Arrival: Ambulance   History obtained from: Patient  History limited by: None     CHIEF COMPLAINT  Chief Complaint   Patient presents with   • Leg Pain     past 4 days.  also reports swelling for past 5 weeks.  LLE with 2+ pitting edema, warm to touch, discoloration (red), and painful to palp.  2+ pedal pulse with rapid cap refill.  reports possible fever.     HPI  Priya Callahan is a 68 y.o. female who presents to the Emergency Department for left leg pain which gradually worsened over the last five weeks. She was last seen here in March and was told that she had COPD. She was discharged home with a prescription for Lasix which she takes 40 mg once a day. Her home health nurse evaluated her leg and was concerned that she may have a blood clot and advised her to come to the ED. Additionally, the patient reports having a subjective fever prior to arrival. The patient denies having any cuts on her foot. She reports a bruise on her left foot from using her wheel chair and accidentally running over her foot. The patient is currently on 4 L of oxygen while at rest at home. She uses 6L of oxygen when exerting herself which is unchanged. The patient has followed up with her physician after being discharged from the hospital last March. During that time she was prescribed steroid and Azithromycin.     REVIEW OF SYSTEMS  See HPI for further details. All other systems are negative.     PAST MEDICAL HISTORY   has a past medical history of Arthritis; ASTHMA; Backpain; Breath shortness; Bronchitis; Congestive heart failure (HCC) (2013); COPD; Diabetes; Disorder of thyroid; Fall; Fibromyalgia; GERD (gastroesophageal reflux disease); Heart burn; Hepatitis C; Hypertension; Indigestion; Infectious disease; Neuropathy (HCC); On home oxygen therapy; Other specified symptom  associated with female genital organs; Pain (9/13/2016); PND (post-nasal drip); Pneumonia; Psychiatric problem (9/13/2016); RLS (restless legs syndrome); Sleep apnea; and Unspecified urinary incontinence.    SURGICAL HISTORY   has a past surgical history that includes gyn surgery; other orthopedic surgery; other orthopedic surgery; us-needle core bx-breast panel; lumpectomy (Left); inj dx/ther agnt paravert facet joint, bruce* (Left, 7/10/2015); inj dx/ther agnt paravert facet joint, bruce* (7/10/2015); inj dx/ther agnt paravert facet joint, bruce* (7/10/2015); inject nerv blck,othr periph nerv (7/10/2015); inj dx/ther agnt paravert facet joint, bruce* (Left, 7/17/2015); inj dx/ther agnt paravert facet joint, bruce* (7/17/2015); inj dx/ther agnt paravert facet joint, bruce* (7/17/2015); inject nerv blck,othr periph nerv (7/17/2015); dstr nrolytc agnt parverteb fct sngl lmbr/sacral (Left, 10/2/2015); dstr nrolytc agnt parverteb fct addl lmbr/sacral (10/2/2015); inject rx other periph nerve (10/2/2015); dstr nrolytc agnt parverteb fct addl lmbr/sacral (10/2/2015); dstr nrolytc agnt parverteb fct sngl lmbr/sacral (Right, 11/6/2015); dstr nrolytc agnt parverteb fct addl lmbr/sacral (11/6/2015); inject rx other periph nerve (11/6/2015); dstr nrolytc agnt parverteb fct addl lmbr/sacral (11/6/2015); dstr nrolytc agnt parverteb fct sngl lmbr/sacral (Left, 9/16/2016); dstr nrolytc agnt parverteb fct addl lmbr/sacral (9/16/2016); dstr nrolytc agnt parverteb fct addl lmbr/sacral (9/16/2016); fluoroscopic guidance needle placement (9/16/2016); and ankle orif (Left, 5/8/2017).    SOCIAL HISTORY  Social History   Substance Use Topics   • Smoking status: Former Smoker     Packs/day: 1.00     Years: 50.00     Types: Cigarettes     Quit date: 3/20/2019   • Smokeless tobacco: Never Used   • Alcohol use No      History   Drug Use No       FAMILY HISTORY  Family History   Problem Relation Age of Onset   • Lung Disease Mother    • Alcohol/Drug Mother   "  • Heart Disease Mother    • Cancer Father    • Cancer Brother    • Diabetes Brother    • Diabetes Maternal Grandmother    • Stroke Paternal Grandmother    • Heart Disease Paternal Grandmother        CURRENT MEDICATIONS  Home Medications     Reviewed by Sirisha Sandra R.N. (Registered Nurse) on 07/20/19 at 1717  Med List Status: Partial   Medication Last Dose Status   albuterol 108 (90 Base) MCG/ACT Aero Soln inhalation aerosol 7/19/2019 Active   amitriptyline (ELAVIL) 50 MG Tab 7/19/2019 Active   calcipotriene (DOVONEX) 0.005 % Cream  Active   cyclobenzaprine (FLEXERIL) 10 MG Tab 7/19/2019 Active   DILTIAZem CD (CARDIZEM CD) 120 MG CAPSULE SR 24 HR 7/20/2019 Active   DULoxetine (CYMBALTA) 60 MG Cap DR Particles delayed-release capsule 7/20/2019 Active   ferrous gluconate (FERGON) 324 (38 Fe) MG Tab 7/20/2019 Active   Fluticasone-Umeclidin-Vilant (TRELEGY ELLIPTA) 100-62.5-25 MCG/INH AEROSOL POWDER, BREATH ACTIVATED 7/20/2019 Active   furosemide (LASIX) 40 MG Tab 7/20/2019 Active   insulin glargine (LANTUS) 100 UNIT/ML Solution 7/19/2019 Active   ipratropium-albuterol (DUONEB) 0.5-2.5 (3) MG/3ML nebulizer solution 7/19/2019 Active   LACTULOSE PO PRN Active   levothyroxine (SYNTHROID) 75 MCG Tab 7/20/2019 Active   oxyCODONE immediate release (ROXICODONE) 10 MG immediate release tablet 7/19/2019 Active   potassium chloride SA (KDUR) 20 MEQ Tab CR 7/20/2019 Active   rosuvastatin (CRESTOR) 10 MG Tab 7/20/2019 Active   traZODone (DESYREL) 150 MG Tab 7/19/2019 Active                ALLERGIES  Allergies   Allergen Reactions   • Vitamin C Diarrhea     \"violent diarrhea\"   • Codeine Nausea     Severe stomach pain   • Gabapentin Palpitations     Gets shaky and falls    • Keflex Rash     Severe rash, but TOLERATES PENICILLINS, Rocephin    • Lyrica Nausea     Nausea, dizzy   • Powders    • Sudafed Nausea and Unspecified     Chest pain, nausea       PHYSICAL EXAM  VITAL SIGNS: Pulse 73   Temp 36.8 °C (98.2 °F) (Temporal)   " "Resp 16   Ht 1.651 m (5' 5\")   Wt 109.8 kg (242 lb)   SpO2 91%   BMI 40.27 kg/m²       Constitutional: Alert in mild apparent distress.  HENT: No signs of trauma, Bilateral external ears normal, Nose normal.   Eyes: Pupils are equal and reactive, Conjunctiva normal, Non-icteric.   Neck: Normal range of motion, No tenderness, Supple, No stridor.   Lymphatic: No lymphadenopathy noted.   Cardiovascular: Regular rate and rhythm, no murmurs.   Thorax & Lungs: Coarse breath sounds, 4L nasal canula, no respiratory distress, no crepitus palpated. No wheezing, No chest tenderness.   Abdomen: Bowel sounds normal, Soft, No tenderness, No masses, No pulsatile masses. No peritoneal signs.  Skin: No rash. Brisk capillary refill. Warm and red on left leg.   Back: No bony tenderness, No CVA tenderness.   Extremities: Intact distal pulses, 2+ edema in right leg, 1+ edema in left. No cyanosis. Warm and red on left leg.   Musculoskeletal: Good range of motion in all major joints. No tenderness to palpation or major deformities noted.   Neurologic: Alert , Normal motor function, Normal sensory function, No focal deficits noted.   Psychiatric: Affect normal, Judgment normal, Mood normal.     DIAGNOSTIC STUDIES / PROCEDURES    EKG Interpretation:  Interpreted by me    12 Lead EKG interpreted by me to show:  Normal sinus rhythm  Rate 63  Axis: Normal  Intervals: Normal  Normal T waves. No T wave depression.   Normal ST segments. No ST elevation.   My impression of this EKG: Does not indicate ischemia or arrhythmia at this time.     LABS  Labs Reviewed   CBC WITH DIFFERENTIAL - Abnormal; Notable for the following:        Result Value    RBC 2.49 (*)     Hemoglobin 7.2 (*)     Hematocrit 24.2 (*)     MCHC 29.8 (*)     RDW 61.7 (*)     Neutrophils-Polys 87.70 (*)     Lymphocytes 5.30 (*)     Neutrophils (Absolute) 8.32 (*)     Lymphs (Absolute) 0.49 (*)     All other components within normal limits   BASIC METABOLIC PANEL - Abnormal; " Notable for the following:     Glucose 158 (*)     Bun 29 (*)     Creatinine 2.05 (*)     All other components within normal limits   CRP QUANTITIVE (NON-CARDIAC) - Abnormal; Notable for the following:     Stat C-Reactive Protein 4.73 (*)     All other components within normal limits   PROBRAIN NATRIURETIC PEPTIDE, NT - Abnormal; Notable for the following:     NT-proBNP 1540 (*)     All other components within normal limits   ESTIMATED GFR - Abnormal; Notable for the following:     GFR If  29 (*)     GFR If Non  24 (*)     All other components within normal limits   DIFFERENTIAL MANUAL   PERIPHERAL SMEAR REVIEW   PLATELET ESTIMATE   MORPHOLOGY   IRON/TOTAL IRON BIND   FERRITIN   VITAMIN B12   FOLATE   RETICULOCYTES COUNT   PROCALCITONIN     All labs reviewed by me.    RADIOLOGY  US-EXTREMITY VENOUS LOWER UNILAT LEFT   Final Result      DX-CHEST-PORTABLE (1 VIEW)   Final Result      Cardiomegaly and mild bibasilar interstitial prominence, left worse than right, likely mild edema.         The radiologist's interpretation of all radiological studies have been reviewed by me.    COURSE & MEDICAL DECISION MAKING  Pertinent Labs & Imaging studies reviewed. (See chart for details)    This is a 68 y.o. female that presents with what appears to be volume overload on exam.  This could represent DVT the regular get an ultrasound.  I will get a BMP to evaluate for worsening volume overload.  Her EKG is nonischemic.  I will get a CRP as well as CBC to evaluate for possible cellulitis.  There is no evidence of crepitus to suggest necrotizing fasciitis..     5:23 PM - Patient seen and examined at bedside. Ordered US-extremity venous lower unilateral left, DX chest, estimated GFR, differential manual, peripheral smear, platelet estimate, morphology, proBrain natriuretic peptide, CBC with differential, CMP, CRP quantitative, EKG.     7:44 PM Spoke with Dr. Harrington, Hospitalist, about the patient's  condition. He agrees to accept the patient for admission. Care is being turned over to the admitting physician at this time.     At this time the DVT ultrasound is negative.  The patient has a chest x-ray that shows mild bibasilar interstitial prominence that is left worse than right as well as an elevated proBNP.  At this time this is likely worsening congestive heart failure.  In addition the patient has an elevated CRP of 4.73.  Her anemia is stable.  Her creatinine is stable.  At this time I will also treat the patient with antibiotics.  I will defer to the hospitalist for this.  She will be admitted in guarded condition for worsening CHF as well as cellulitis.    7:58 PM - Patient was reevaluated at bedside. Discussed lab and radiology results with the patient and informed them of the results as shown above. I informed her that she will need to be admitted to the hospital for further evaluation.     DISPOSITION:  Patient will be admitted to Dr. Harrington in guarded condition.    FINAL IMPRESSION  1. Cellulitis of left lower extremity    2. Acute congestive heart failure, unspecified heart failure type (HCC)          Nguyen CLAY (Alla), am scribing for, and in the presence of, Angel Davila M.D..    Electronically signed by: Nguyen Love (Alla), 7/20/2019    Angel CLAY M.D. personally performed the services described in this documentation, as scribed by Nguyen Love in my presence, and it is both accurate and complete.     C    The note accurately reflects work and decisions made by me.  Angel Davila  7/21/2019  12:08 AM

## 2019-07-21 NOTE — ASSESSMENT & PLAN NOTE
Appears to be at baseline   Monitor renal function closely   Assess response to IV lasix   Avoid nephrotoxic medications

## 2019-07-21 NOTE — ASSESSMENT & PLAN NOTE
Leg is tender to palpation Erythematous and edematous   DVT ultrasound unremarkable   Continue iv abx  montior for clinical improvement

## 2019-07-21 NOTE — PROGRESS NOTES
Bedside report received, pt care assumed, tele box on.  Pt is A&Ox4, but lethargic. Pt is resting in bed and denies any additional needs at this time. Bed in lowest position, bed alarm on pt educated on fall risk and verbalized understanding, call light within reach.

## 2019-07-22 VITALS
OXYGEN SATURATION: 95 % | SYSTOLIC BLOOD PRESSURE: 136 MMHG | HEART RATE: 77 BPM | RESPIRATION RATE: 18 BRPM | TEMPERATURE: 96.9 F | HEIGHT: 65 IN | DIASTOLIC BLOOD PRESSURE: 65 MMHG | BODY MASS INDEX: 40.44 KG/M2 | WEIGHT: 242.73 LBS

## 2019-07-22 PROBLEM — J96.21 ACUTE ON CHRONIC RESPIRATORY FAILURE WITH HYPOXIA (HCC): Status: RESOLVED | Noted: 2019-07-20 | Resolved: 2019-07-22

## 2019-07-22 PROBLEM — J44.1 COPD EXACERBATION (HCC): Status: RESOLVED | Noted: 2019-07-20 | Resolved: 2019-07-22

## 2019-07-22 PROBLEM — J96.22 ACUTE ON CHRONIC RESPIRATORY FAILURE WITH HYPOXIA AND HYPERCAPNIA (HCC): Status: RESOLVED | Noted: 2019-07-21 | Resolved: 2019-07-22

## 2019-07-22 PROBLEM — N18.9 CKD (CHRONIC KIDNEY DISEASE): Status: RESOLVED | Noted: 2019-07-20 | Resolved: 2019-07-22

## 2019-07-22 PROBLEM — J96.21 ACUTE ON CHRONIC RESPIRATORY FAILURE WITH HYPOXIA AND HYPERCAPNIA (HCC): Status: RESOLVED | Noted: 2019-07-21 | Resolved: 2019-07-22

## 2019-07-22 LAB
GLUCOSE BLD-MCNC: 345 MG/DL (ref 65–99)
GLUCOSE BLD-MCNC: 368 MG/DL (ref 65–99)
GLUCOSE BLD-MCNC: 398 MG/DL (ref 65–99)

## 2019-07-22 PROCEDURE — 96376 TX/PRO/DX INJ SAME DRUG ADON: CPT

## 2019-07-22 PROCEDURE — 82962 GLUCOSE BLOOD TEST: CPT | Mod: 91

## 2019-07-22 PROCEDURE — 94640 AIRWAY INHALATION TREATMENT: CPT

## 2019-07-22 PROCEDURE — 700102 HCHG RX REV CODE 250 W/ 637 OVERRIDE(OP): Performed by: HOSPITALIST

## 2019-07-22 PROCEDURE — A9270 NON-COVERED ITEM OR SERVICE: HCPCS | Performed by: HOSPITALIST

## 2019-07-22 PROCEDURE — 94760 N-INVAS EAR/PLS OXIMETRY 1: CPT

## 2019-07-22 PROCEDURE — 700111 HCHG RX REV CODE 636 W/ 250 OVERRIDE (IP): Performed by: HOSPITALIST

## 2019-07-22 PROCEDURE — 96372 THER/PROPH/DIAG INJ SC/IM: CPT

## 2019-07-22 PROCEDURE — 700101 HCHG RX REV CODE 250: Performed by: HOSPITALIST

## 2019-07-22 PROCEDURE — 99239 HOSP IP/OBS DSCHRG MGMT >30: CPT | Performed by: HOSPITALIST

## 2019-07-22 RX ORDER — SULFAMETHOXAZOLE AND TRIMETHOPRIM 800; 160 MG/1; MG/1
1 TABLET ORAL 2 TIMES DAILY
Qty: 10 TAB | Refills: 0 | Status: SHIPPED | OUTPATIENT
Start: 2019-07-22 | End: 2019-07-27

## 2019-07-22 RX ORDER — PREDNISONE 20 MG/1
20 TABLET ORAL DAILY
Qty: 3 TAB | Refills: 0 | Status: SHIPPED | OUTPATIENT
Start: 2019-07-22 | End: 2019-07-25

## 2019-07-22 RX ADMIN — DULOXETINE HYDROCHLORIDE 60 MG: 60 CAPSULE, DELAYED RELEASE ORAL at 05:27

## 2019-07-22 RX ADMIN — OXYCODONE HYDROCHLORIDE 10 MG: 10 TABLET ORAL at 01:21

## 2019-07-22 RX ADMIN — INSULIN HUMAN 4 UNITS: 100 INJECTION, SOLUTION PARENTERAL at 05:22

## 2019-07-22 RX ADMIN — METHYLPREDNISOLONE SODIUM SUCCINATE 40 MG: 40 INJECTION, POWDER, FOR SOLUTION INTRAMUSCULAR; INTRAVENOUS at 00:00

## 2019-07-22 RX ADMIN — FUROSEMIDE 40 MG: 10 INJECTION, SOLUTION INTRAMUSCULAR; INTRAVENOUS at 05:28

## 2019-07-22 RX ADMIN — METHYLPREDNISOLONE SODIUM SUCCINATE 40 MG: 40 INJECTION, POWDER, FOR SOLUTION INTRAMUSCULAR; INTRAVENOUS at 05:28

## 2019-07-22 RX ADMIN — INSULIN HUMAN 5 UNITS: 100 INJECTION, SOLUTION PARENTERAL at 12:21

## 2019-07-22 RX ADMIN — ROSUVASTATIN CALCIUM 10 MG: 10 TABLET, FILM COATED ORAL at 05:27

## 2019-07-22 RX ADMIN — IPRATROPIUM BROMIDE AND ALBUTEROL SULFATE 3 ML: .5; 3 SOLUTION RESPIRATORY (INHALATION) at 02:00

## 2019-07-22 RX ADMIN — METHYLPREDNISOLONE SODIUM SUCCINATE 40 MG: 40 INJECTION, POWDER, FOR SOLUTION INTRAMUSCULAR; INTRAVENOUS at 12:21

## 2019-07-22 RX ADMIN — ENOXAPARIN SODIUM 40 MG: 100 INJECTION SUBCUTANEOUS at 05:26

## 2019-07-22 RX ADMIN — LEVOTHYROXINE SODIUM 75 MCG: 75 TABLET ORAL at 05:27

## 2019-07-22 RX ADMIN — IPRATROPIUM BROMIDE AND ALBUTEROL SULFATE 3 ML: .5; 3 SOLUTION RESPIRATORY (INHALATION) at 10:49

## 2019-07-22 RX ADMIN — DOXYCYCLINE 100 MG: 100 TABLET, FILM COATED ORAL at 05:27

## 2019-07-22 RX ADMIN — SENNOSIDES, DOCUSATE SODIUM 2 TABLET: 50; 8.6 TABLET, FILM COATED ORAL at 05:27

## 2019-07-22 RX ADMIN — DILTIAZEM HYDROCHLORIDE 120 MG: 120 CAPSULE, COATED, EXTENDED RELEASE ORAL at 05:27

## 2019-07-22 RX ADMIN — INSULIN HUMAN 2.5 UNITS: 100 INJECTION, SOLUTION PARENTERAL at 01:24

## 2019-07-22 NOTE — PROGRESS NOTES
Bedside report received. Patient resting in bed. RN assessed pain; patient declines pain present at this time. Call light within reach. Need for bed alarm assessed; patient is at high risk for falls, fall precautions in place as appropriate.

## 2019-07-22 NOTE — PROGRESS NOTES
"Received telephone call from hospital , Christine asking if this patient needs a seven calendar day f/u and an appointment at [facility]. Christine informed me that the patient is to discharge today.    I reviewed the chart. Patient was ruled out for HF by Dr. Garcia on her last admission 5 weeks ago.     She is admitted again, and is diagnosed with heart failure by Dr. Zepeda, no cardiology consult was obtained for the new diagnosis. Structural issues on echocardiogram last month are listed as \"mild\". Also that echo shows Grade II DD which can be transient.    I have asked hospital schedulers to arrange a f/u at the [facility] with an MD and to please put in appointment notes that the visit is to confirm HFpEF diagnosis given by hospital medicine.    I have ordered bedside nursing to please educate patient that she has been diagnosed with a type of HF (heart failure with preserved ejection fraction) by hospital medicine and that this diagnosis is a challenging one because so many other conditions can mimick it.    In this patient's case, other confounding conditions are:     · Ongoing anemia  · GFR of 25  · COPD  · EDITH    I've included in this order to educate the patient that the cardiology appointment we are scheduling at the [facility] for her is to confirm the HFpEF diagnosis, and as such, it is very important she attend.    Thank you, Porsche, Cardiovascular Nurse Navigator, RN, CHFN [phone]  "

## 2019-07-22 NOTE — DISCHARGE INSTRUCTIONS
Discharge Instructions    Discharged to home by car with relative. Discharged via wheelchair, hospital escort: Yes.  Special equipment needed: Oxygen    Be sure to schedule a follow-up appointment with your primary care doctor or any specialists as instructed.     Discharge Plan:   Smoking Cessation Offered: Patient Counseled  Influenza Vaccine Indication: Indicated: Not available from distributor/    I understand that a diet low in cholesterol, fat, and sodium is recommended for good health. Unless I have been given specific instructions below for another diet, I accept this instruction as my diet prescription.   Other diet: Cardiac        Special Instructions:     HF Patient Discharge Instructions  · Monitor your weight daily, and maintain a weight chart, to track your weight changes.   · Activity as tolerated, unless your Doctor has ordered otherwise. Other activity order:  · Follow a low fat, low cholesterol, low salt diet unless instructed otherwise by your Doctor. Read the labels on the back of food products and track your intake of fat, cholesterol and salt.   · Fluid Restriction No. If a Fluid Restriction has been ordered by your Doctor, measure fluids with a measuring cup to ensure that you are not exceeding the restriction.   · No smoking.  · Oxygen Yes. If your Doctor has ordered that you wear Oxygen at home, it is important to wear it as ordered.  · Did you receive an explanation from staff on the importance of taking each of your medications and why it is necessary to keep taking them unless your doctor says to stop? Yes  · Were all of your questions answered about how to manage your heart failure and what to do if you have increased signs and symptoms after you go home? Yes  · Do you feel like your heart failure care team involved you in the care treatment plan and allowed you to make decisions regarding your care while in the hospital and addressed any discharge needs you might have?  Yes    See the educational handout provided at discharge for more information on monitoring your daily weight, activity and diet. This also explains more about Heart Failure, symptoms of a flare-up and some of the tests that you have undergone.     Warning Signs of a Flare-Up include:  · Swelling in the ankles or lower legs.  · Shortness of breath, while at rest, or while doing normal activities.   · Shortness of breath at night when in bed, or coughing in bed.   · Requiring more pillows to sleep at night, or needing to sit up at night to sleep.  · Feeling weak, dizzy or fatigued.     When to call your Doctor:  · Call [facility] seven days a week from 8:00 a.m. to 8:00 p.m. for medical questions [phone].  · Call your Primary Care Physician or Cardiologist if:   1. You experience any pain radiating to your jaw or neck.  2. You have any difficulty breathing.  3. You experience weight gain of 3 lbs in a day or 5 lbs in a week.   4. You feel any palpitations or irregular heartbeats.  5. You become dizzy or lose consciousness.   If you have had an angiogram or had a pacemaker or AICD placed, and experience:  1. Bleeding, drainage or swelling at the surgical / puncture site.  2. Fever greater than 100.0 F  3. Shock from internal defibrillator.  4. Cool and / or numb extremities.      · Is patient discharged on Warfarin / Coumadin?   No     Depression / Suicide Risk    As you are discharged from this [facility], it is important to learn how to keep safe from harming yourself.    Recognize the warning signs:  · Abrupt changes in personality, positive or negative- including increase in energy   · Giving away possessions  · Change in eating patterns- significant weight changes-  positive or negative  · Change in sleeping patterns- unable to sleep or sleeping all the time   · Unwillingness or inability to communicate  · Depression  · Unusual sadness, discouragement and loneliness  · Talk of  wanting to die  · Neglect of personal appearance   · Rebelliousness- reckless behavior  · Withdrawal from people/activities they love  · Confusion- inability to concentrate     If you or a loved one observes any of these behaviors or has concerns about self-harm, here's what you can do:  · Talk about it- your feelings and reasons for harming yourself  · Remove any means that you might use to hurt yourself (examples: pills, rope, extension cords, firearm)  · Get professional help from the community (Mental Health, Substance Abuse, psychological counseling)  · Do not be alone:Call your Safe Contact- someone whom you trust who will be there for you.  · Call your local CRISIS HOTLINE 359-7467 or 459-587-9569  · Call your local Children's Mobile Crisis Response Team [place] (148) 204-6629 or www.Jibestream  · Call the toll free National Suicide Prevention Hotlines   · National Suicide Prevention Lifeline 914-615-GOZO (9987)  · [employer] Line Network 800-SUICIDE (654-3498)      Heart Failure  Heart failure means your heart has trouble pumping blood. This makes it hard for your body to work well. Heart failure is usually a long-term (chronic) condition. You must take good care of yourself and follow your doctor's treatment plan.  HOME CARE  · Take your heart medicine as told by your doctor.  ¨ Do not stop taking medicine unless your doctor tells you to.  ¨ Do not skip any dose of medicine.  ¨ Refill your medicines before they run out.  ¨ Take other medicines only as told by your doctor or pharmacist.  · Stay active if told by your doctor. The elderly and people with severe heart failure should talk with a doctor about physical activity.  · Eat heart-healthy foods. Choose foods that are without trans fat and are low in saturated fat, cholesterol, and salt (sodium). This includes fresh or frozen fruits and vegetables, fish, lean meats, fat-free or low-fat dairy foods, whole grains, and high-fiber foods.  Lentils and dried peas and beans (legumes) are also good choices.  · Limit salt if told by your doctor.  · Cook in a healthy way. Roast, grill, broil, bake, poach, steam, or stir-barrett foods.  · Limit fluids as told by your doctor.  · Weigh yourself every morning. Do this after you pee (urinate) and before you eat breakfast. Write down your weight to give to your doctor.  · Take your blood pressure and write it down if your doctor tells you to.  · Ask your doctor how to check your pulse. Check your pulse as told.  · Lose weight if told by your doctor.  · Stop smoking or chewing tobacco. Do not use gum or patches that help you quit without your doctor's approval.  · Schedule and go to doctor visits as told.  · Nonpregnant women should have no more than 1 drink a day. Men should have no more than 2 drinks a day. Talk to your doctor about drinking alcohol.  · Stop illegal drug use.  · Stay current with shots (immunizations).  · Manage your health conditions as told by your doctor.  · Learn to manage your stress.  · Rest when you are tired.  · If it is really hot outside:  ¨ Avoid intense activities.  ¨ Use air conditioning or fans, or get in a cooler place.  ¨ Avoid caffeine and alcohol.  ¨ Wear loose-fitting, lightweight, and light-colored clothing.  · If it is really cold outside:  ¨ Avoid intense activities.  ¨ Layer your clothing.  ¨ Wear mittens or gloves, a hat, and a scarf when going outside.  ¨ Avoid alcohol.  · Learn about heart failure and get support as needed.  · Get help to maintain or improve your quality of life and your ability to care for yourself as needed.  GET HELP IF:   · You gain weight quickly.  · You are more short of breath than usual.  · You cannot do your normal activities.  · You tire easily.  · You cough more than normal, especially with activity.  · You have any or more puffiness (swelling) in areas such as your hands, feet, ankles, or belly (abdomen).  · You cannot sleep because it is hard  to breathe.  · You feel like your heart is beating fast (palpitations).  · You get dizzy or light-headed when you stand up.  GET HELP RIGHT AWAY IF:   · You have trouble breathing.  · There is a change in mental status, such as becoming less alert or not being able to focus.  · You have chest pain or discomfort.  · You faint.  MAKE SURE YOU:   · Understand these instructions.  · Will watch your condition.  · Will get help right away if you are not doing well or get worse.  This information is not intended to replace advice given to you by your health care provider. Make sure you discuss any questions you have with your health care provider.  Document Released: 09/26/2009 Document Revised: 01/08/2016 Document Reviewed: 02/03/2014  [employer] Interactive Patient Education © 2017 [employer]      Cellulitis, Adult  Cellulitis is a skin infection. The infected area is usually red and tender. This condition occurs most often in the arms and lower legs. The infection can travel to the muscles, blood, and underlying tissue and become serious. It is very important to get treated for this condition.  What are the causes?  Cellulitis is caused by bacteria. The bacteria enter through a break in the skin, such as a cut, burn, insect bite, open sore, or crack.  What increases the risk?  This condition is more likely to occur in people who:  · Have a weak defense system (immune system).  · Have open wounds on the skin such as cuts, burns, bites, and scrapes. Bacteria can enter the body through these open wounds.  · Are older.  · Have diabetes.  · Have a type of long-lasting (chronic) liver disease (cirrhosis) or kidney disease.  · Use IV drugs.  What are the signs or symptoms?  Symptoms of this condition include:  · Redness, streaking, or spotting on the skin.  · Swollen area of the skin.  · Tenderness or pain when an area of the skin is touched.  · Warm skin.  · Fever.  · Chills.  · Blisters.  How is this diagnosed?  This  condition is diagnosed based on a medical history and physical exam. You may also have tests, including:  · Blood tests.  · Lab tests.  · Imaging tests.  How is this treated?  Treatment for this condition may include:  · Medicines, such as antibiotic medicines or antihistamines.  · Supportive care, such as rest and application of cold or warm cloths (cold or warm compresses) to the skin.  · Hospital care, if the condition is severe.  The infection usually gets better within 1-2 days of treatment.  Follow these instructions at home:  · Take over-the-counter and prescription medicines only as told by your health care provider.  · If you were prescribed an antibiotic medicine, take it as told by your health care provider. Do not stop taking the antibiotic even if you start to feel better.  · Drink enough fluid to keep your urine clear or pale yellow.  · Do not touch or rub the infected area.  · Raise (elevate) the infected area above the level of your heart while you are sitting or lying down.  · Apply warm or cold compresses to the affected area as told by your health care provider.  · Keep all follow-up visits as told by your health care provider. This is important. These visits let your health care provider make sure a more serious infection is not developing.  Contact a health care provider if:  · You have a fever.  · Your symptoms do not improve within 1-2 days of starting treatment.  · Your bone or joint underneath the infected area becomes painful after the skin has healed.  · Your infection returns in the same area or another area.  · You notice a swollen bump in the infected area.  · You develop new symptoms.  · You have a general ill feeling (malaise) with muscle aches and pains.  Get help right away if:  · Your symptoms get worse.  · You feel very sleepy.  · You develop vomiting or diarrhea that persists.  · You notice red streaks coming from the infected area.  · Your red area gets larger or turns dark in  color.  This information is not intended to replace advice given to you by your health care provider. Make sure you discuss any questions you have with your health care provider.  Document Released: 09/27/2006 Document Revised: 04/27/2017 Document Reviewed: 10/26/2016  [employer] Interactive Patient Education © 2017 [employer]      Chronic Obstructive Pulmonary Disease Exacerbation  Chronic obstructive pulmonary disease (COPD) is a common lung condition in which airflow from the lungs is limited. COPD is a general term that can be used to describe many different lung problems that limit airflow, including chronic bronchitis and emphysema. COPD exacerbations are episodes when breathing symptoms become much worse and require extra treatment. Without treatment, COPD exacerbations can be life threatening, and frequent COPD exacerbations can cause further damage to your lungs.  What are the causes?  · Respiratory infections.  · Exposure to smoke.  · Exposure to air pollution, chemical fumes, or dust.  Sometimes there is no apparent cause or trigger.  What increases the risk?  · Smoking cigarettes.  · Older age.  · Frequent prior COPD exacerbations.  What are the signs or symptoms?  · Increased coughing.  · Increased thick spit (sputum) production.  · Increased wheezing.  · Increased shortness of breath.  · Rapid breathing.  · Chest tightness.  How is this diagnosed?  Your medical history, a physical exam, and tests will help your health care provider make a diagnosis. Tests may include:  · A chest X-ray.  · Basic lab tests.  · Sputum testing.  · An arterial blood gas test.  How is this treated?  Depending on the severity of your COPD exacerbation, you may need to be admitted to a hospital for treatment. Some of the treatments commonly used to treat COPD exacerbations are:  · Antibiotic medicines.  · Bronchodilators. These are drugs that expand the air passages. They may be given with an inhaler or nebulizer. Spacer  devices may be needed to help improve drug delivery.  · Corticosteroid medicines.  · Supplemental oxygen therapy.  · Airway clearing techniques, such as noninvasive ventilation (NIV) and positive expiratory pressure (PEP). These provide respiratory support through a mask or other noninvasive device.  Follow these instructions at home:  · Do not smoke. Quitting smoking is very important to prevent COPD from getting worse and exacerbations from happening as often.  · Avoid exposure to all substances that irritate the airway, especially to tobacco smoke.  · If you were prescribed an antibiotic medicine, finish it all even if you start to feel better.  · Take all medicines as directed by your health care provider. It is important to use correct technique with inhaled medicines.  · Drink enough fluids to keep your urine clear or pale yellow (unless you have a medical condition that requires fluid restriction).  · Use a cool mist vaporizer. This makes it easier to clear your chest when you cough.  · If you have a home nebulizer and oxygen, continue to use them as directed.  · Maintain all necessary vaccinations to prevent infections.  · Exercise regularly.  · Eat a healthy diet.  · Keep all follow-up appointments as directed by your health care provider.  Get help right away if:  · You have worsening shortness of breath.  · You have trouble talking.  · You have severe chest pain.  · You have blood in your sputum.  · You have a fever.  · You have weakness, vomit repeatedly, or faint.  · You feel confused.  · You continue to get worse.  This information is not intended to replace advice given to you by your health care provider. Make sure you discuss any questions you have with your health care provider.  Document Released: 10/14/2008 Document Revised: 05/25/2017 Document Reviewed: 08/22/2014  [employer] Interactive Patient Education © 2017 [employer]    Anemia, Nonspecific  Anemia is a condition in which the concentration  of red blood cells or hemoglobin in the blood is below normal. Hemoglobin is a substance in red blood cells that carries oxygen to the tissues of the body. Anemia results in not enough oxygen reaching these tissues.  What are the causes?  Common causes of anemia include:  · Excessive bleeding. Bleeding may be internal or external. This includes excessive bleeding from periods (in women) or from the intestine.  · Poor nutrition.  · Chronic kidney, thyroid, and liver disease.  · Bone marrow disorders that decrease red blood cell production.  · Cancer and treatments for cancer.  · HIV, AIDS, and their treatments.  · Spleen problems that increase red blood cell destruction.  · Blood disorders.  · Excess destruction of red blood cells due to infection, medicines, and autoimmune disorders.  What are the signs or symptoms?  · Minor weakness.  · Dizziness.  · Headache.  · Palpitations.  · Shortness of breath, especially with exercise.  · Paleness.  · Cold sensitivity.  · Indigestion.  · Nausea.  · Difficulty sleeping.  · Difficulty concentrating.  Symptoms may occur suddenly or they may develop slowly.  How is this diagnosed?  Additional blood tests are often needed. These help your health care provider determine the best treatment. Your health care provider will check your stool for blood and look for other causes of blood loss.  How is this treated?  Treatment varies depending on the cause of the anemia. Treatment can include:  · Supplements of iron, vitamin B12, or folic acid.  · Hormone medicines.  · A blood transfusion. This may be needed if blood loss is severe.  · Hospitalization. This may be needed if there is significant continual blood loss.  · Dietary changes.  · Spleen removal.  Follow these instructions at home:  Keep all follow-up appointments. It often takes many weeks to correct anemia, and having your health care provider check on your condition and your response to treatment is very important.  Get help  right away if:  · You develop extreme weakness, shortness of breath, or chest pain.  · You become dizzy or have trouble concentrating.  · You develop heavy vaginal bleeding.  · You develop a rash.  · You have bloody or black, tarry stools.  · You faint.  · You vomit up blood.  · You vomit repeatedly.  · You have abdominal pain.  · You have a fever or persistent symptoms for more than 2-3 days.  · You have a fever and your symptoms suddenly get worse.  · You are dehydrated.  This information is not intended to replace advice given to you by your health care provider. Make sure you discuss any questions you have with your health care provider.  Document Released: 01/25/2006 Document Revised: 05/31/2017 Document Reviewed: 06/13/2014  [employer] Interactive Patient Education © 2017 [employer]

## 2019-07-22 NOTE — PROGRESS NOTES
Patient discharged home with assisted living transport. A&Ox4. IV's taken out. Monitor taken off; monitor room notified. Patient belongings discharged with patient. Discharge summary done with patient. RN provided education regarding follow up care, appointments, and medications. Rn  Also provided education regarding when to call doctor and when to call 911.

## 2019-07-22 NOTE — DISCHARGE PLANNING
Anticipated Discharge Disposition: Home to [facility].    Action: RN CM met with pt to discuss dc plan. Pt  Has called transport people from [facility] and is also having them bring a portable oxygen tank. Pt very happy at her new place, at last admission this writer had this pt and was aware of her trying to find affordable housing.    Barriers to Discharge: None.    Plan: Nursing to dc pt per order.

## 2019-07-22 NOTE — DISCHARGE SUMMARY
Discharge Summary    CHIEF COMPLAINT ON ADMISSION  Chief Complaint   Patient presents with   • Leg Pain     past 4 days.  also reports swelling for past 5 weeks.  LLE with 2+ pitting edema, warm to touch, discoloration (red), and painful to palp.  2+ pedal pulse with rapid cap refill.  reports possible fever.       Reason for Admission  ems      Admission Date  7/20/2019    CODE STATUS  Full Code    HPI & HOSPITAL COURSE      68 y.o. female who presented 7/20/2019 with Past medical history of morbid obesity, EDITH on CPAP machine, COPD on chronic 4 L of oxygen at 6 L with exertion, diabetes, hypertension who presents with left lower extremity erythema and swelling, found to have cellulitis of her left leg. She was started on antibiotics with improvement, US LE was negative for DVT.  she was with mild sob on admission, CXR revealed Cardiomegaly and mild bibasilar interstitial prominence, left worse than right, likely mild edema she was given IV lasix with improvement, Her echo last month showed ef 60% with Grade II DD, which could have been transient, however her CXR did show concern for acute edema. Therefore she needs to fu with pcp and cardiology for ongoing workup and confirmed diagnosis   She does have acute COPD exacerbation and was started on steroids .  Overall she did well and is back to her baseline O2 and LE cellulitis has improved therefore she will be discharged home in stable medical conditions and fu with her PCP  ER precautions given    Patient understood and agreed with the above plan    Therefore, she is discharged in good and stable condition to home with close outpatient follow-up.    The patient met 2-midnight criteria for an inpatient stay at the time of discharge.    Discharge Date  7/22/19    FOLLOW UP ITEMS POST DISCHARGE  pcp  Cardiology; HF clinic      DISCHARGE DIAGNOSES  Principal Problem (Resolved):    Acute on chronic combined systolic and diastolic congestive heart failure (HCC) POA:  Yes      Overview:                               ICD-10 transition  Active Problems:    Cellulitis of left leg POA: Unknown    DM type 2, uncontrolled, with renal complications (HCC) (Chronic) POA: Yes    Hypothyroidism (Chronic) POA: Yes    EDITH (obstructive sleep apnea) (Chronic) POA: Yes    Class 3 severe obesity due to excess calories with serious comorbidity and body mass index (BMI) of 40.0 to 44.9 in adult (HCC) POA: Yes    Anemia POA: Yes  Resolved Problems:    Acute on chronic respiratory failure with hypoxia (HCC) POA: Unknown    COPD exacerbation (HCC) POA: Unknown    CKD (chronic kidney disease) POA: Unknown    Acute on chronic respiratory failure with hypoxia and hypercapnia (HCC) POA: Unknown      FOLLOW UP  Future Appointments  Date Time Provider Department Center   7/26/2019 8:00 AM [facility] None   7/26/2019 2:00 PM Eduardo Hernandez M.D. [facility] None   8/5/2019 11:15 AM [facility]   8/13/2019 10:20 AM [facility]   9/18/2019 11:00 AM [facility]     No follow-up provider specified.    MEDICATIONS ON DISCHARGE     Medication List      START taking these medications      Instructions   predniSONE 20 MG Tabs  Commonly known as:  DELTASONE   Take 1 Tab by mouth every day for 3 days.  Dose:  20 mg     sulfamethoxazole-trimethoprim 800-160 MG tablet  Commonly known as:  BACTRIM DS   Take 1 Tab by mouth 2 times a day for 5 days.  Dose:  1 Tab        CONTINUE taking these medications      Instructions   albuterol 108 (90 Base) MCG/ACT Aers inhalation aerosol   Inhale 2 Puffs by mouth every four hours as needed for Shortness of Breath.  Dose:  2 Puff     amitriptyline 50 MG Tabs  Commonly known as:  ELAVIL   Take 50 mg by mouth every bedtime.  Dose:  50 mg     calcipotriene 0.005 % Crea  Commonly known as:  DOVONEX   APPLY 1-2 GRAMS TOPICALLY TO AFFECTED AREA 1-2 TIMES A DAY. DO NOT APPLY TO FACE     cyclobenzaprine 10 MG Tabs  Commonly known as:   "FLEXERIL   Take 10 mg by mouth every bedtime.  Dose:  10 mg     DILTIAZem  MG Cp24  Commonly known as:  CARDIZEM CD   Take 1 Cap by mouth every day.  Dose:  120 mg     DULoxetine 60 MG Cpep delayed-release capsule  Commonly known as:  CYMBALTA   TAKE 1 CAPSULE BY MOUTH DAILY     ferrous gluconate 324 (38 Fe) MG Tabs  Commonly known as:  FERGON   Take 1 Tab by mouth every morning with breakfast.  Dose:  324 mg     furosemide 40 MG Tabs  Commonly known as:  LASIX   Take 1 Tab by mouth every day.  Dose:  40 mg     insulin glargine 100 UNIT/ML Soln  Commonly known as:  LANTUS   Inject 20 Units as instructed every evening.  Dose:  20 Units     ipratropium-albuterol 0.5-2.5 (3) MG/3ML nebulizer solution  Commonly known as:  DUONEB   3 mL by Nebulization route every 6 hours as needed for Shortness of Breath.  Dose:  3 mL     LACTULOSE PO   Take  by mouth as needed.     levothyroxine 75 MCG Tabs  Commonly known as:  SYNTHROID   TAKE 1 TABLET BY MOUTH DAILY     oxyCODONE immediate release 10 MG immediate release tablet  Start taking on:  8/26/2019  Commonly known as:  ROXICODONE   Take 1 Tab by mouth 3 times a day as needed for Moderate Pain for up to 30 days.  Dose:  10 mg     potassium chloride SA 20 MEQ Tbcr  Commonly known as:  Kdur   Take 1 Tab by mouth every day.  Dose:  20 mEq     rosuvastatin 10 MG Tabs  Commonly known as:  CRESTOR   Take 10 mg by mouth every morning.  Dose:  10 mg     traZODone 150 MG Tabs  Commonly known as:  DESYREL   Take 150 mg by mouth every bedtime.  Dose:  150 mg     TRELEGY ELLIPTA 100-62.5-25 MCG/INH Aepb  Generic drug:  Fluticasone-Umeclidin-Vilant   Inhale 1 Puff by mouth every day.  Dose:  1 Puff            Allergies  Allergies   Allergen Reactions   • Vitamin C Diarrhea     \"violent diarrhea\"   • Codeine Nausea     Severe stomach pain   • Gabapentin Palpitations     Gets shaky and falls    • Keflex Rash     Severe rash, but TOLERATES PENICILLINS, Rocephin    • Lyrica Nausea     " Nausea, dizzy   • Powders    • Sudafed Nausea and Unspecified     Chest pain, nausea   • Citrus        DIET  Orders Placed This Encounter   Procedures   • Diet Order Cardiac     Standing Status:   Standing     Number of Occurrences:   1     Order Specific Question:   Diet:     Answer:   Cardiac [6]       ACTIVITY  As tolerated.  Weight bearing as tolerated    CONSULTATIONS  none    PROCEDURES  none    LABORATORY  Lab Results   Component Value Date    SODIUM 137 07/21/2019    POTASSIUM 4.8 07/21/2019    CHLORIDE 99 07/21/2019    CO2 28 07/21/2019    GLUCOSE 290 (H) 07/21/2019    BUN 27 (H) 07/21/2019    CREATININE 2.00 (H) 07/21/2019        Lab Results   Component Value Date    WBC 11.4 (H) 07/21/2019    HEMOGLOBIN 7.4 (L) 07/21/2019    HEMATOCRIT 25.0 (L) 07/21/2019    PLATELETCT 223 07/21/2019        Total time of the discharge process exceeds 45 minutes.

## 2019-07-23 ENCOUNTER — PATIENT OUTREACH (OUTPATIENT)
Dept: HEALTH INFORMATION MANAGEMENT | Facility: OTHER | Age: 69
End: 2019-07-23

## 2019-07-23 NOTE — TELEPHONE ENCOUNTER
Was the patient seen in the last year in this department? Yes    Does patient have an active prescription for medications requested? No     Received Request Via: Pharmacy      Pt met protocol?: Yes pt last ov 6/2019   Pharmacy is requesting a new prescription for the 14mcg patch also this was a med pt received in hospital on 4/19/2019

## 2019-07-24 ENCOUNTER — PATIENT OUTREACH (OUTPATIENT)
Dept: HEALTH INFORMATION MANAGEMENT | Facility: OTHER | Age: 69
End: 2019-07-24

## 2019-07-24 ENCOUNTER — HOSPITAL ENCOUNTER (OUTPATIENT)
Dept: LAB | Facility: MEDICAL CENTER | Age: 69
End: 2019-07-24
Attending: FAMILY MEDICINE
Payer: MEDICARE

## 2019-07-24 ENCOUNTER — HOSPITAL ENCOUNTER (OUTPATIENT)
Dept: LAB | Facility: MEDICAL CENTER | Age: 69
End: 2019-07-24
Attending: INTERNAL MEDICINE
Payer: MEDICARE

## 2019-07-24 DIAGNOSIS — I10 ESSENTIAL HYPERTENSION: ICD-10-CM

## 2019-07-24 DIAGNOSIS — N17.9 AKI (ACUTE KIDNEY INJURY) (HCC): ICD-10-CM

## 2019-07-24 DIAGNOSIS — N18.4 CKD (CHRONIC KIDNEY DISEASE), STAGE IV (HCC): ICD-10-CM

## 2019-07-24 DIAGNOSIS — N18.30 CKD (CHRONIC KIDNEY DISEASE) STAGE 3, GFR 30-59 ML/MIN (HCC): ICD-10-CM

## 2019-07-24 LAB
ALBUMIN SERPL BCP-MCNC: 3.7 G/DL (ref 3.2–4.9)
ALBUMIN/GLOB SERPL: 1.4 G/DL
ALP SERPL-CCNC: 42 U/L (ref 30–99)
ALT SERPL-CCNC: 27 U/L (ref 2–50)
ANION GAP SERPL CALC-SCNC: 11 MMOL/L (ref 0–11.9)
ANION GAP SERPL CALC-SCNC: 9 MMOL/L (ref 0–11.9)
AST SERPL-CCNC: 20 U/L (ref 12–45)
BASOPHILS # BLD AUTO: 0.2 % (ref 0–1.8)
BASOPHILS # BLD: 0.03 K/UL (ref 0–0.12)
BILIRUB SERPL-MCNC: 0.4 MG/DL (ref 0.1–1.5)
BUN SERPL-MCNC: 73 MG/DL (ref 8–22)
BUN SERPL-MCNC: 77 MG/DL (ref 8–22)
CALCIUM SERPL-MCNC: 8.9 MG/DL (ref 8.5–10.5)
CALCIUM SERPL-MCNC: 8.9 MG/DL (ref 8.5–10.5)
CHLORIDE SERPL-SCNC: 100 MMOL/L (ref 96–112)
CHLORIDE SERPL-SCNC: 101 MMOL/L (ref 96–112)
CO2 SERPL-SCNC: 26 MMOL/L (ref 20–33)
CO2 SERPL-SCNC: 27 MMOL/L (ref 20–33)
CREAT SERPL-MCNC: 3.05 MG/DL (ref 0.5–1.4)
CREAT SERPL-MCNC: 3.08 MG/DL (ref 0.5–1.4)
EOSINOPHIL # BLD AUTO: 0.03 K/UL (ref 0–0.51)
EOSINOPHIL NFR BLD: 0.2 % (ref 0–6.9)
ERYTHROCYTE [DISTWIDTH] IN BLOOD BY AUTOMATED COUNT: 60.2 FL (ref 35.9–50)
GLOBULIN SER CALC-MCNC: 2.7 G/DL (ref 1.9–3.5)
GLUCOSE SERPL-MCNC: 91 MG/DL (ref 65–99)
GLUCOSE SERPL-MCNC: 93 MG/DL (ref 65–99)
HCT VFR BLD AUTO: 23.7 % (ref 37–47)
HGB BLD-MCNC: 7.4 G/DL (ref 12–16)
IMM GRANULOCYTES # BLD AUTO: 0.28 K/UL (ref 0–0.11)
IMM GRANULOCYTES NFR BLD AUTO: 2 % (ref 0–0.9)
LYMPHOCYTES # BLD AUTO: 2.09 K/UL (ref 1–4.8)
LYMPHOCYTES NFR BLD: 14.9 % (ref 22–41)
MCH RBC QN AUTO: 29.4 PG (ref 27–33)
MCHC RBC AUTO-ENTMCNC: 31.2 G/DL (ref 33.6–35)
MCV RBC AUTO: 94 FL (ref 81.4–97.8)
MONOCYTES # BLD AUTO: 1.17 K/UL (ref 0–0.85)
MONOCYTES NFR BLD AUTO: 8.4 % (ref 0–13.4)
NEUTROPHILS # BLD AUTO: 10.41 K/UL (ref 2–7.15)
NEUTROPHILS NFR BLD: 74.3 % (ref 44–72)
NRBC # BLD AUTO: 0.02 K/UL
NRBC BLD-RTO: 0.1 /100 WBC
PLATELET # BLD AUTO: 276 K/UL (ref 164–446)
PMV BLD AUTO: 9.4 FL (ref 9–12.9)
POTASSIUM SERPL-SCNC: 4.7 MMOL/L (ref 3.6–5.5)
POTASSIUM SERPL-SCNC: 4.8 MMOL/L (ref 3.6–5.5)
PROT SERPL-MCNC: 6.4 G/DL (ref 6–8.2)
RBC # BLD AUTO: 2.52 M/UL (ref 4.2–5.4)
SODIUM SERPL-SCNC: 136 MMOL/L (ref 135–145)
SODIUM SERPL-SCNC: 138 MMOL/L (ref 135–145)
WBC # BLD AUTO: 14 K/UL (ref 4.8–10.8)

## 2019-07-24 PROCEDURE — 80053 COMPREHEN METABOLIC PANEL: CPT

## 2019-07-24 PROCEDURE — 80048 BASIC METABOLIC PNL TOTAL CA: CPT

## 2019-07-24 PROCEDURE — 36415 COLL VENOUS BLD VENIPUNCTURE: CPT

## 2019-07-24 PROCEDURE — 85025 COMPLETE CBC W/AUTO DIFF WBC: CPT

## 2019-07-24 NOTE — TELEPHONE ENCOUNTER
Dr Mccall- Pt states that she was started on this strength in the hospital. Please refill as you see fit. Not sure if pt should be decreased to 7mg at this time.

## 2019-07-25 ENCOUNTER — TELEPHONE (OUTPATIENT)
Dept: NEPHROLOGY | Facility: MEDICAL CENTER | Age: 69
End: 2019-07-25

## 2019-07-25 ENCOUNTER — TELEPHONE (OUTPATIENT)
Dept: MEDICAL GROUP | Facility: PHYSICIAN GROUP | Age: 69
End: 2019-07-25

## 2019-07-25 RX ORDER — NICOTINE 21 MG/24HR
1 PATCH, TRANSDERMAL 24 HOURS TRANSDERMAL EVERY 24 HOURS
Qty: 28 PATCH | Refills: 1 | Status: SHIPPED | OUTPATIENT
Start: 2019-07-25 | End: 2019-08-15

## 2019-07-25 NOTE — TELEPHONE ENCOUNTER
Dr. Najjar,   I was called with critical lab results.  I defer to your expertise with regards to Priya's decline in renal function.   Thank you  [place]

## 2019-08-05 ENCOUNTER — APPOINTMENT (OUTPATIENT)
Dept: RADIOLOGY | Facility: MEDICAL CENTER | Age: 69
End: 2019-08-05
Attending: FAMILY MEDICINE
Payer: MEDICARE

## 2019-08-06 ENCOUNTER — OFFICE VISIT (OUTPATIENT)
Dept: CARDIOLOGY | Facility: MEDICAL CENTER | Age: 69
End: 2019-08-06
Payer: MEDICARE

## 2019-08-06 VITALS
DIASTOLIC BLOOD PRESSURE: 70 MMHG | BODY MASS INDEX: 38.82 KG/M2 | SYSTOLIC BLOOD PRESSURE: 118 MMHG | OXYGEN SATURATION: 93 % | HEIGHT: 65 IN | WEIGHT: 233 LBS | HEART RATE: 69 BPM

## 2019-08-06 DIAGNOSIS — I50.30 HEART FAILURE WITH PRESERVED EJECTION FRACTION, UNSPECIFIED HF CHRONICITY (HCC): ICD-10-CM

## 2019-08-06 DIAGNOSIS — I25.10 CORONARY ARTERY CALCIFICATION SEEN ON CT SCAN: ICD-10-CM

## 2019-08-06 DIAGNOSIS — G47.33 OSA (OBSTRUCTIVE SLEEP APNEA): Chronic | ICD-10-CM

## 2019-08-06 DIAGNOSIS — I50.20 ACC/AHA STAGE C SYSTOLIC HEART FAILURE (HCC): ICD-10-CM

## 2019-08-06 DIAGNOSIS — I34.2 NON-RHEUMATIC MITRAL VALVE STENOSIS: ICD-10-CM

## 2019-08-06 PROCEDURE — 99214 OFFICE O/P EST MOD 30 MIN: CPT | Performed by: INTERNAL MEDICINE

## 2019-08-06 RX ORDER — FUROSEMIDE 20 MG/1
20 TABLET ORAL 2 TIMES DAILY
Qty: 60 TAB | Refills: 11 | Status: SHIPPED
Start: 2019-08-06 | End: 2019-12-17

## 2019-08-06 ASSESSMENT — MINNESOTA LIVING WITH HEART FAILURE QUESTIONNAIRE (MLHF)
FEELING LIKE A BURDEN TO FAMILY AND FRIENDS: 5
HAVING TO SIT OR LIE DOWN DURING THE DAY: 3
MAKING YOU WORRY: 5
MAKING YOU SHORT OF BREATH: 5
SWELLING IN ANKLES OR LEGS: 5
DIFFICULTY SOCIALIZING WITH FAMILY OR FRIENDS: 4
DIFFICULTY GOING AWAY FROM HOME: 5
MAKING YOU STAY IN A HOSPITAL: 5
DIFFICULTY SLEEPING WELL AT NIGHT: 4
GIVING YOU SIDE EFFECTS FROM TREATMENTS: 2
DIFFICULTY WITH RECREATIONAL PASTIMES, SPORTS, HOBBIES: 5
DIFFICULTY TO CONCENTRATE OR REMEMBERING THINGS: 4
WORKING AROUND THE HOUSE OR YARD DIFFICULT: 5
TIRED, FATIGUED OR LOW ON ENERGY: 5
TOTAL_SCORE: 91
LOSS OF SELF CONTROL IN YOUR LIFE: 4
DIFFICULTY WITH SEXUAL ACTIVITIES: 0
DIFFICULTY WORKING TO EARN A LIVING: 5
COSTING YOU MONEY FOR MEDICAL CARE: 5
MAKING YOU FEEL DEPRESSED: 5
WALKING ABOUT OR CLIMBING STAIRS DIFFICULT: 5
EATING LESS FOODS YOU LIKE: 5

## 2019-08-06 ASSESSMENT — ENCOUNTER SYMPTOMS
SPEECH CHANGE: 0
CONSTIPATION: 1
SPUTUM PRODUCTION: 1
SHORTNESS OF BREATH: 1
POLYDIPSIA: 1
SORE THROAT: 1
LOSS OF CONSCIOUSNESS: 0
BLURRED VISION: 0
PHOTOPHOBIA: 1
MYALGIAS: 0
VOMITING: 0
DIZZINESS: 1
PALPITATIONS: 0
DEPRESSION: 1
NERVOUS/ANXIOUS: 1
EYE PAIN: 0
CHILLS: 0
FEVER: 0
BACK PAIN: 1
NAUSEA: 0
TREMORS: 1
ABDOMINAL PAIN: 0
HEMOPTYSIS: 0
WHEEZING: 0
COUGH: 1
EYE DISCHARGE: 0
BRUISES/BLEEDS EASILY: 1

## 2019-08-06 ASSESSMENT — 6 MINUTE WALK TEST (6MWT): TOTAL DISTANCE WALKED (METERS): 36

## 2019-08-06 NOTE — PROGRESS NOTES
"Chief Complaint   Patient presents with   • Congestive Heart Failure       Subjective:   Priya Callahan is a 68 y.o. female who presents today for a new evaluation in heart failure clinic because of possible heart failure with preserved ejection fraction.  She has a history of severe anemia.  She has been admitted in June and in July with increasing dyspnea and an elevated BNP in July.  Her echocardiogram shows normal systolic left ventricular function with at least mild mitral stenosis.  Diastolic function was rated as grade 2 but this is dubious in the setting of mitral stenosis.    She has been on oxygen 24/7 over the last couple years because of severe underlying COPD.  She has dyspnea at less than 30 feet.  She denies any PND or orthopnea on oxygen therapy.  She does have edema especially in her left foot.  She denies any chest discomfort, palpitations or lightheadedness.    Past Medical History:   Diagnosis Date   • Arthritis     \"everywhere\"   • ASTHMA    • Backpain     chronic back pain   • Breath shortness     \"only with flare up with allergies\"   • Bronchitis     as a child   • Congestive heart failure (HCC) 2013    related to pulmonary failure   • COPD    • Diabetes     Type 2   • Disorder of thyroid     Hypothyroid   • Fall    • Fibromyalgia    • GERD (gastroesophageal reflux disease)    • Heart burn    • Hepatitis C     \"I have markers in blood but not active\"   • Hypertension    • Indigestion    • Infectious disease     Hepatitis C   • Neuropathy (HCC)     bilat feet   • On home oxygen therapy    • Other specified symptom associated with female genital organs     Hysterectomy at age 22yrs   • Pain 9/13/2016    \"pain everywhere\"   • PND (post-nasal drip)    • Pneumonia     as a child   • Psychiatric problem 9/13/2016    PTSD   • RLS (restless legs syndrome)    • Sleep apnea     does not wear CPAP, \"can't do it\"   • Unspecified urinary incontinence      Past Surgical History:   Procedure " Laterality Date   • ANKLE ORIF Left 5/8/2017    Procedure: ANKLE ORIF;  Surgeon: Rico Gtz M.D.;  Location: [facility];  Service:    • KS DSTR NROLYTC AGNT PARVERTEB FCT SNGL LMBR/SACRAL Left 9/16/2016    Procedure: NEURO DEST FACET L/S W/IG SNGL - L3-S1;  Surgeon: Xavier Arteaga D.O.;  Location: SURGERY [facility];  Service: Pain Management   • KS DSTR NROLYTC AGNT PARVERTEB FCT ADDL LMBR/SACRAL  9/16/2016    Procedure: NEURO DEST FACET L/S W/IG ADDL;  Surgeon: Xavier Arteaga D.O.;  Location: SURGERY [facility];  Service: Pain Management   • KS DSTR NROLYTC AGNT PARVERTEB FCT ADDL LMBR/SACRAL  9/16/2016    Procedure: NEURO DEST FACET L/S W/IG ADDL;  Surgeon: Xavier Arteaga D.O.;  Location: SURGERY [facility];  Service: Pain Management   • PB FLUOROSCOPIC GUIDANCE NEEDLE PLACEMENT  9/16/2016    Procedure: FLOURO GUIDE NEEDLE PLACEMENT;  Surgeon: Xavier Arteaga D.O.;  Location: SURGERY [facility];  Service: Pain Management   • KS DSTR NROLYTC AGNT PARVERTEB FCT SNGL LMBR/SACRAL Right 11/6/2015    Procedure: NEURO DEST FACET L/S W/IG SNGL - L3-S1;  Surgeon: Xavier Arteaga D.O.;  Location: SURGERY [facility];  Service: Pain Management   • KS DSTR NROLYTC AGNT PARVERTEB FCT ADDL LMBR/SACRAL  11/6/2015    Procedure: NEURO DEST FACET L/S W/IG ADDL;  Surgeon: Xavier Arteaga D.O.;  Location: SURGERY [facility];  Service: Pain Management   • PB INJECT RX OTHER PERIPH NERVE  11/6/2015    Procedure: NEUROLYTIC DEST-OTHER NERVE;  Surgeon: Xavier Arteaga D.O.;  Location: SURGERY [facility];  Service: Pain Management   • KS DSTR NROLYTC AGNT PARVERTEB FCT ADDL LMBR/SACRAL  11/6/2015    Procedure: NEURO DEST FACET L/S W/IG ADDL;  Surgeon: Xavier Arteaga D.O.;  [facility];  Service: Pain Management   • KS DSTR NROLYTC AGNT PARVERTEB FCT SNGL LMBR/SACRAL Left 10/2/2015    Procedure: NEURO DEST FACET L/S  W/IG SNGL - L3-S1;  Surgeon: Xavier Arteaga D.O.;  Location: SURGERY [facility];  Service: Pain Management   • MI DSTR NROLYTC AGNT PARVERTEB FCT ADDL LMBR/SACRAL  10/2/2015    Procedure: NEURO DEST FACET L/S W/IG ADDL;  Surgeon: Xavier Arteaga D.O.;  Location: SURGERY [facility];  Service: Pain Management   • PB INJECT RX OTHER PERIPH NERVE  10/2/2015    Procedure: NEUROLYTIC DEST-OTHER NERVE;  Surgeon: Xavier Arteaga D.O.;  Location: SURGERY [facility];  Service: Pain Management   • MI DSTR NROLYTC AGNT PARVERTEB FCT ADDL LMBR/SACRAL  10/2/2015    Procedure: NEURO DEST FACET L/S W/IG ADDL;  Surgeon: Xavier Arteaga D.O.;  Location: [facility];  Service: Pain Management   • PB INJ DX/THER AGNT PARAVERT FACET JOINT, OBED* Left 7/17/2015    Procedure: INJ PARA FACET L/S 1 LVL W/IG - L3-S1;  Surgeon: Xavier Arteaga D.O.;  Location: [facility];  Service: Pain Management   • PB INJ DX/THER AGNT PARAVERT FACET JOINT, OBED*  7/17/2015    Procedure: INJ PARA FACET L/S 2D LVL W/IG;  Surgeon: Xavier Arteaga D.O.;  Location: [facility];  Service: Pain Management   • PB INJ DX/THER AGNT PARAVERT FACET JOINT, OBED*  7/17/2015    Procedure: INJ PARA FACET L/S 3D LVL W/IG;  Surgeon: Xavier Arteaga D.O.;  Location: SURGERY [facility];  Service: Pain Management   • PB INJECT NERV BLCK,OTHR PERIPH NERV  7/17/2015    Procedure: INJ-ANES AGENT-OTHER PHER.NRVE;  Surgeon: Xavier Arteaga D.O.;  Location: [facility];  Service: Pain Management   • PB INJ DX/THER AGNT PARAVERT FACET JOINT, OBED* Left 7/10/2015    Procedure: INJ PARA FACET L/S 1 LVL W/IG - L3-S1;  Surgeon: Xavier Arteaga D.O.;  [facility];  Service: Pain Management   • PB INJ DX/THER AGNT PARAVERT FACET JOINT, OBED*  7/10/2015    Procedure: INJ PARA FACET L/S 2D LVL W/IG;  Surgeon: Xavier Arteaga D.O.;  [facility];  Service: Pain Management   • PB INJ DX/THER AGNT PARAVERT FACET JOINT, OBED*  7/10/2015    Procedure: INJ PARA FACET L/S 3D LVL W/IG;  Surgeon: Xavier Arteaga D.O.;  [facility];  Service: Pain Management   • PB INJECT NERV BLCK,OTHR PERIPH NERV  7/10/2015    Procedure: INJ-ANES AGENT-OTHER PHER.NRVE;  Surgeon: Xavier Arteaga D.O.;  [facility];  Service: Pain Management   • GYN SURGERY      hysterectomy    • LUMPECTOMY Left     Left breast   • OTHER ORTHOPEDIC SURGERY      L knee replacement    • OTHER ORTHOPEDIC SURGERY      R carpal tunnel    • US-NEEDLE CORE BX-BREAST PANEL       Family History   Problem Relation Age of Onset   • Lung Disease Mother    • Alcohol/Drug Mother    • Heart Disease Mother    • Cancer Father    • Cancer Brother    • Diabetes Brother    • Diabetes Maternal Grandmother    • Stroke Paternal Grandmother    • Heart Disease Paternal Grandmother      Social History     Socioeconomic History   • Marital status:      Spouse name: Not on file   • Number of children: Not on file   • Years of education: Not on file   • Highest education level: Not on file   Occupational History   • Not on file   Social Needs   • Financial resource strain: Not on file   • Food insecurity:     Worry: Not on file     Inability: Not on file   • Transportation needs:     Medical: Not on file     Non-medical: Not on file   Tobacco Use   • Smoking status: Current Every Day Smoker     Packs/day: 0.25     Years: 50.00     Pack years: 12.50     Types: Cigarettes     Last attempt to quit: 3/20/2019     Years since quittin.3   • Smokeless tobacco: Never Used   Substance and Sexual Activity   • Alcohol use: No     Alcohol/week: 0.0 oz   • Drug use: No   • Sexual activity: Never   Lifestyle   • Physical activity:     Days per week: Not on file     Minutes per session: Not on file   • Stress: Not on file   Relationships   • Social connections:     Talks on  "phone: Not on file     Gets together: Not on file     Attends Yarsanism service: Not on file     Active member of club or organization: Not on file     Attends meetings of clubs or organizations: Not on file     Relationship status: Not on file   • Intimate partner violence:     Fear of current or ex partner: Not on file     Emotionally abused: Not on file     Physically abused: Not on file     Forced sexual activity: Not on file   Other Topics Concern   • Not on file   Social History Narrative   • Not on file     Allergies   Allergen Reactions   • Vitamin C Diarrhea     \"violent diarrhea\"   • Codeine Nausea     Severe stomach pain   • Gabapentin Palpitations     Gets shaky and falls    • Keflex Rash     Severe rash, but TOLERATES PENICILLINS, Rocephin    • Lyrica Nausea     Nausea, dizzy   • Powders    • Sudafed Nausea and Unspecified     Chest pain, nausea   • [place]      Outpatient Encounter Medications as of 8/6/2019   Medication Sig Dispense Refill   • nicotine (NICODERM) 14 MG/24HR PATCH 24 HR Apply 1 Patch to skin as directed every 24 hours. 28 Patch 1   • calcipotriene (DOVONEX) 0.005 % Cream APPLY 1-2 GRAMS TOPICALLY TO AFFECTED AREA 1-2 TIMES A DAY. DO NOT APPLY TO FACE 360 g 3   • [START ON 8/26/2019] oxyCODONE immediate release (ROXICODONE) 10 MG immediate release tablet Take 1 Tab by mouth 3 times a day as needed for Moderate Pain for up to 30 days. 90 Tab 0   • ipratropium-albuterol (DUONEB) 0.5-2.5 (3) MG/3ML nebulizer solution 3 mL by Nebulization route every 6 hours as needed for Shortness of Breath. 30 Bullet 0   • DILTIAZem CD (CARDIZEM CD) 120 MG CAPSULE SR 24 HR Take 1 Cap by mouth every day. 30 Cap 2   • furosemide (LASIX) 40 MG Tab Take 1 Tab by mouth every day. 30 Tab 2   • traZODone (DESYREL) 150 MG Tab Take 150 mg by mouth every bedtime.  3   • Fluticasone-Umeclidin-Vilant (TRELEGY ELLIPTA) 100-62.5-25 MCG/INH AEROSOL POWDER, BREATH ACTIVATED Inhale 1 Puff by mouth every day.     • insulin " glargine (LANTUS) 100 UNIT/ML Solution Inject 20 Units as instructed every evening.     • rosuvastatin (CRESTOR) 10 MG Tab Take 10 mg by mouth every morning.     • albuterol 108 (90 Base) MCG/ACT Aero Soln inhalation aerosol Inhale 2 Puffs by mouth every four hours as needed for Shortness of Breath.  3   • cyclobenzaprine (FLEXERIL) 10 MG Tab Take 10 mg by mouth every bedtime.  2   • levothyroxine (SYNTHROID) 75 MCG Tab TAKE 1 TABLET BY MOUTH DAILY 90 Tab 3   • amitriptyline (ELAVIL) 50 MG Tab Take 50 mg by mouth every bedtime.  3   • DULoxetine (CYMBALTA) 60 MG Cap DR Particles delayed-release capsule TAKE 1 CAPSULE BY MOUTH DAILY 90 Cap 1   • ferrous gluconate (FERGON) 324 (38 Fe) MG Tab Take 1 Tab by mouth every morning with breakfast. (Patient not taking: Reported on 8/6/2019) 30 Tab 1   • potassium chloride SA (KDUR) 20 MEQ Tab CR Take 1 Tab by mouth every day. (Patient not taking: Reported on 8/6/2019) 60 Tab 1   • LACTULOSE PO Take  by mouth as needed.       No facility-administered encounter medications on file as of 8/6/2019.      Review of Systems   Constitutional: Positive for malaise/fatigue. Negative for chills and fever.   HENT: Positive for nosebleeds and sore throat. Negative for congestion.    Eyes: Positive for photophobia. Negative for blurred vision, pain and discharge.   Respiratory: Positive for cough, sputum production and shortness of breath. Negative for hemoptysis and wheezing.    Cardiovascular: Positive for leg swelling. Negative for chest pain and palpitations.   Gastrointestinal: Positive for constipation. Negative for abdominal pain, nausea and vomiting.   Musculoskeletal: Positive for back pain and joint pain. Negative for myalgias.   Skin: Positive for rash. Negative for itching.   Neurological: Positive for dizziness and tremors. Negative for speech change and loss of consciousness.   Endo/Heme/Allergies: Positive for environmental allergies and polydipsia. Bruises/bleeds easily.  "  Psychiatric/Behavioral: Positive for depression. The patient is nervous/anxious.    All other systems reviewed and are negative.       Objective:   /70 (BP Location: Left arm, Patient Position: Sitting, BP Cuff Size: Adult)   Pulse 69   Ht 1.651 m (5' 5\")   Wt 105.7 kg (233 lb)   SpO2 93%   BMI 38.77 kg/m²     Physical Exam   Constitutional: She appears well-developed and well-nourished.   Neck: No JVD present.   Cardiovascular: Normal rate and regular rhythm.   Murmur (3/6 systolic at the base) heard.  Pulmonary/Chest: Effort normal and breath sounds normal. She has no rales.   Abdominal: Soft. There is no tenderness.   Musculoskeletal: She exhibits edema (1-2+ left and 1+ right pretibial).     Lab Results   Component Value Date/Time    CHOLSTRLTOT 241 (H) 07/24/2018 09:32 AM     (H) 07/24/2018 09:32 AM    HDL 51 07/24/2018 09:32 AM    TRIGLYCERIDE 266 (H) 07/24/2018 09:32 AM       Lab Results   Component Value Date/Time    SODIUM 138 07/24/2019 08:05 AM    SODIUM 136 07/24/2019 08:05 AM    POTASSIUM 4.7 07/24/2019 08:05 AM    POTASSIUM 4.8 07/24/2019 08:05 AM    CHLORIDE 101 07/24/2019 08:05 AM    CHLORIDE 100 07/24/2019 08:05 AM    CO2 26 07/24/2019 08:05 AM    CO2 27 07/24/2019 08:05 AM    GLUCOSE 91 07/24/2019 08:05 AM    GLUCOSE 93 07/24/2019 08:05 AM    BUN 77 (HH) 07/24/2019 08:05 AM    BUN 73 (HH) 07/24/2019 08:05 AM    CREATININE 3.08 (H) 07/24/2019 08:05 AM    CREATININE 3.05 (H) 07/24/2019 08:05 AM     Lab Results   Component Value Date/Time    ALKPHOSPHAT 42 07/24/2019 08:05 AM    ASTSGOT 20 07/24/2019 08:05 AM    ALTSGPT 27 07/24/2019 08:05 AM    TBILIRUBIN 0.4 07/24/2019 08:05 AM      Lab Results   Component Value Date/Time    BNPBTYPENAT 238 (H) 06/14/2019 11:25 PM      Echocardiography Laboratory    CONCLUSIONS  Normal left ventricular systolic function.  Left ventricular ejection fraction is visually estimated to be 60%.  Grade II diastolic dysfunction.  The right ventricle was " normal in size and function.  Moderate mitral stenosis.  Mean transvalvular gradient is 9 mmHg at a heart rate of  BPM.  Mild aortic stenosis.  Compared to the images of the prior study done 3/16/2017 -  there is   now evidence for mild aortic stenosis. The estimate of right   ventricular systolic pressure cannot be made on the current exam,   previously 60 mmHg.    CHAITANYA CABELLO  Exam Date:         06/05/2019    Assessment:     1. ACC/AHA stage C systolic heart failure (HCC)     2. Heart failure with preserved ejection fraction, unspecified HF chronicity (HCC)     3. Non-rheumatic mitral valve stenosis     4. EDITH (obstructive sleep apnea)     5. Coronary artery calcification seen on CT scan         Medical Decision Making:  Today's Assessment / Status / Plan:     Ms. Cabello has had difficulty with increasing dyspnea and an elevated BNP.  However, her dyspnea did not improve with diuretic therapy.  She does have multiple risk factors for dyspnea including anemia, underlying COPD, chronic kidney disease and moderate mitral stenosis.  Her dyspnea could certainly be due to mitral stenosis though it would be expected to improve with diuretic therapy.  She does have significant renal dysfunction.  At this time, we will reduce furosemide to 20 mg twice daily.  She is advised to use compression stockings and elevate her feet for 30 minutes 3 times daily.  She will follow-up in a couple of weeks with a BMP.  I suspect that we may ultimately need to proceed to cardiac catheterization to more definitively determine the etiology of her dyspnea and edema.

## 2019-08-07 ENCOUNTER — TELEPHONE (OUTPATIENT)
Dept: MEDICAL GROUP | Facility: PHYSICIAN GROUP | Age: 69
End: 2019-08-07

## 2019-08-09 RX ORDER — CYCLOBENZAPRINE HCL 10 MG
10 TABLET ORAL
Qty: 90 TAB | Refills: 3 | Status: SHIPPED | OUTPATIENT
Start: 2019-08-09

## 2019-08-13 NOTE — DOCUMENTATION QUERY
[facility]                                                                       Query Response Note      PATIENT:               CHAITANYA CABELLO  ACCT #:                  [id number]  MRN:                     [id number]  :                      1950  ADMIT DATE:       2019 4:57 PM  DISCH DATE:        2019 1:05 PM  RESPONDING  PROVIDER #:        [id number]           QUERY TEXT:    Uncontrolled diabetes is documented in the medical record.  There is no code in ICD 10 for uncontrolled diabetes.  Further clarification is needed:      NOTE:  If an appropriate response is not listed below, please respond with a new note.                The patient's Clinical Indicators include:  Progress Notes by Tory Ziegler M.D. at 2019    DM type 2, uncontrolled, with renal complications (HCC)- (present on admission)   Assessment & Plan   SSI ordered  Resume home lantus   accu checks  Options provided:   -- Uncontrolled diabetes meaning with hypoglycemia   -- Uncontrolled diabetes meaning with hyperglycemia   -- Findings of no clinical significance   -- Unable to determine      Query created by: Kellee Ahmadi on 2019 8:30 AM    RESPONSE TEXT:    Uncontrolled diabetes meaning with hyperglycemia          Electronically signed by:  TORY ZIEGLER 2019 11:20 AM

## 2019-08-15 ENCOUNTER — OFFICE VISIT (OUTPATIENT)
Dept: MEDICAL GROUP | Facility: PHYSICIAN GROUP | Age: 69
End: 2019-08-15
Payer: MEDICARE

## 2019-08-15 VITALS
DIASTOLIC BLOOD PRESSURE: 93 MMHG | OXYGEN SATURATION: 94 % | RESPIRATION RATE: 16 BRPM | HEIGHT: 65 IN | WEIGHT: 232 LBS | BODY MASS INDEX: 38.65 KG/M2 | TEMPERATURE: 99.1 F | HEART RATE: 74 BPM | SYSTOLIC BLOOD PRESSURE: 146 MMHG

## 2019-08-15 DIAGNOSIS — E03.9 HYPOTHYROIDISM, UNSPECIFIED TYPE: ICD-10-CM

## 2019-08-15 DIAGNOSIS — D64.9 ANEMIA REQUIRING TRANSFUSIONS: ICD-10-CM

## 2019-08-15 DIAGNOSIS — Z23 NEED FOR VACCINATION: ICD-10-CM

## 2019-08-15 DIAGNOSIS — J96.11 CHRONIC RESPIRATORY FAILURE WITH HYPOXIA (HCC): ICD-10-CM

## 2019-08-15 DIAGNOSIS — N18.4 CKD (CHRONIC KIDNEY DISEASE), STAGE IV (HCC): ICD-10-CM

## 2019-08-15 DIAGNOSIS — L03.116 CELLULITIS OF LEFT LEG: ICD-10-CM

## 2019-08-15 DIAGNOSIS — F17.200 TOBACCO DEPENDENCE: ICD-10-CM

## 2019-08-15 PROCEDURE — 99214 OFFICE O/P EST MOD 30 MIN: CPT | Mod: 25 | Performed by: FAMILY MEDICINE

## 2019-08-15 PROCEDURE — 90471 IMMUNIZATION ADMIN: CPT | Performed by: FAMILY MEDICINE

## 2019-08-15 PROCEDURE — 90715 TDAP VACCINE 7 YRS/> IM: CPT | Performed by: FAMILY MEDICINE

## 2019-08-15 RX ORDER — FLASH GLUCOSE SCANNING READER
1 EACH MISCELLANEOUS
Qty: 1 DEVICE | Refills: 0 | Status: SHIPPED | OUTPATIENT
Start: 2019-08-15 | End: 2019-12-17 | Stop reason: SDUPTHER

## 2019-08-15 RX ORDER — FLASH GLUCOSE SENSOR
1 KIT MISCELLANEOUS
Qty: 2 EACH | Refills: 11 | Status: SHIPPED | OUTPATIENT
Start: 2019-08-15 | End: 2019-12-17 | Stop reason: SDUPTHER

## 2019-08-15 RX ORDER — FERROUS GLUCONATE 324(38)MG
324 TABLET ORAL
Qty: 30 TAB | Refills: 1 | Status: SHIPPED
Start: 2019-08-15 | End: 2019-12-17

## 2019-08-15 RX ORDER — NAPROXEN 500 MG/1
TABLET ORAL
Refills: 0 | COMMUNITY
Start: 2019-08-08 | End: 2019-08-15

## 2019-08-15 RX ORDER — AMOXICILLIN 500 MG/1
500 CAPSULE ORAL 3 TIMES DAILY
Qty: 30 CAP | Refills: 0 | Status: SHIPPED | OUTPATIENT
Start: 2019-08-15 | End: 2019-08-25

## 2019-08-15 RX ORDER — DOXYCYCLINE HYCLATE 100 MG
100 TABLET ORAL 2 TIMES DAILY
Qty: 14 TAB | Refills: 0 | Status: SHIPPED | OUTPATIENT
Start: 2019-08-15 | End: 2019-09-18

## 2019-08-15 RX ORDER — AMOXICILLIN 500 MG/1
CAPSULE ORAL
Refills: 0 | COMMUNITY
Start: 2019-08-08 | End: 2019-08-15

## 2019-08-15 NOTE — ASSESSMENT & PLAN NOTE
Patient has COPD is on oxygen supplementation she smokes about half a pack a day.  She would like to know if she can use nicotine patches and Chantix together I said that she can.  It is imperative that she use any means to quit smoking.  I did stress the importance of this to her.  She has tried to quit is motivated to quit also.

## 2019-08-15 NOTE — ASSESSMENT & PLAN NOTE
During recent hospitalization for COPD exacerbation patient was placed on 6 L of oxygen she is now back to her baseline of 4 L of supplemental oxygen via nasal cannula.  She can return to the portable oxygen compressor.  She no longer needs the oxygen tanks.  I will provide a prescription for order to fax to [facility] home care for return of the tanks COPD and for her to get the portable oxygen compressor.  She should keep to tanks in case she has another COPD exacerbation and increased oxygen demand.  Patient continues on her inhalers Trulicity.  Not needing her rescue inhaler frequently.

## 2019-08-15 NOTE — PROGRESS NOTES
Subjective:   Priya Callahan is a 68 y.o. female here today for evaluation and management of:     Chronic respiratory failure with hypoxia (HCC)  During recent hospitalization for COPD exacerbation patient was placed on 6 L of oxygen she is now back to her baseline of 4 L of supplemental oxygen via nasal cannula.  She can return to the portable oxygen compressor.  She no longer needs the oxygen tanks.  I will provide a prescription for [facility] of the tanks COPD and for her to get the portable oxygen compressor.  She should keep to tanks in case she has another COPD exacerbation and increased oxygen demand.  Patient continues on her inhalers Trulicity.  Not needing her rescue inhaler frequently.    Cellulitis of left leg  Off and on since March patient has been struggling with cellulitis swelling and redness and pain of her left lower extremity from her foot to her mid calf.  She was advised to wear compression stockings and so she does use compression stockings.  She is currently on antibiotics for abscessed teeth: Amoxicillin 500 mg she noticed that when she started taking this her foot symptoms got better she will need to probably take stronger antibiotic like doxycycline.  She is allergic to Keflex.  Prescription provided for 14 days if this is not improving we will try bactrim.     Tobacco dependence  Patient has COPD is on oxygen supplementation she smokes about half a pack a day.  She would like to know if she can use nicotine patches and Chantix together I said that she can.  It is imperative that she use any means to quit smoking.  I did stress the importance of this to her.  She has tried to quit is motivated to quit also.         Current medicines (including changes today)  Current Outpatient Medications   Medication Sig Dispense Refill   • Zoster Vac Recomb Adjuvanted (SHINGRIX) 50 MCG/0.5ML Recon Susp 0.5 mL by Intramuscular route Once for 1 dose. 0.5 mL 0    • Misc. Devices Misc Portable oxygen compressor and associated supplies.  Patient may keep to oxygen tanks at home.  Return all other oxygen tanks. 1 Each 0   • ferrous gluconate (FERGON) 324 (38 Fe) MG Tab Take 1 Tab by mouth every morning with breakfast. 30 Tab 1   • doxycycline (VIBRAMYCIN) 100 MG Tab Take 1 Tab by mouth 2 times a day. 14 Tab 0   • amoxicillin (AMOXIL) 500 MG Cap Take 1 Cap by mouth 3 times a day for 10 days. Start this only if the doxycycline does not help with the cellulitis of your lower leg 30 Cap 0   • cyclobenzaprine (FLEXERIL) 10 MG Tab Take 1 Tab by mouth every bedtime. 90 Tab 3   • Diclofenac Sodium 1 % Gel Apply 2 g to skin as directed 2 times a day as needed (for pain). 90 g 3   • furosemide (LASIX) 20 MG Tab Take 1 Tab by mouth 2 times a day. (Patient taking differently: Take 20 mg by mouth every day.) 60 Tab 11   • [START ON 8/26/2019] oxyCODONE immediate release (ROXICODONE) 10 MG immediate release tablet Take 1 Tab by mouth 3 times a day as needed for Moderate Pain for up to 30 days. 90 Tab 0   • ipratropium-albuterol (DUONEB) 0.5-2.5 (3) MG/3ML nebulizer solution 3 mL by Nebulization route every 6 hours as needed for Shortness of Breath. 30 Bullet 0   • DILTIAZem CD (CARDIZEM CD) 120 MG CAPSULE SR 24 HR Take 1 Cap by mouth every day. 30 Cap 2   • LACTULOSE PO Take  by mouth as needed.     • traZODone (DESYREL) 150 MG Tab Take 150 mg by mouth every bedtime.  3   • Fluticasone-Umeclidin-Vilant (TRELEGY ELLIPTA) 100-62.5-25 MCG/INH AEROSOL POWDER, BREATH ACTIVATED Inhale 1 Puff by mouth every day.     • insulin glargine (LANTUS) 100 UNIT/ML Solution Inject 20 Units as instructed every evening.     • rosuvastatin (CRESTOR) 10 MG Tab Take 10 mg by mouth every morning.     • albuterol 108 (90 Base) MCG/ACT Aero Soln inhalation aerosol Inhale 2 Puffs by mouth every four hours as needed for Shortness of Breath.  3   • levothyroxine (SYNTHROID) 75 MCG Tab TAKE 1 TABLET BY MOUTH DAILY 90  "Tab 3   • amitriptyline (ELAVIL) 50 MG Tab Take 50 mg by mouth every bedtime.  3   • DULoxetine (CYMBALTA) 60 MG Cap DR Particles delayed-release capsule TAKE 1 CAPSULE BY MOUTH DAILY 90 Cap 1   • potassium chloride SA (KDUR) 20 MEQ Tab CR Take 1 Tab by mouth every day. (Patient not taking: Reported on 8/6/2019) 60 Tab 1     No current facility-administered medications for this visit.      She  has a past medical history of Arthritis, ASTHMA, Backpain, Breath shortness, Bronchitis, Congestive heart failure (HCC) (2013), COPD, Diabetes, Disorder of thyroid, Fall, Fibromyalgia, GERD (gastroesophageal reflux disease), Heart burn, Hepatitis C, Hypertension, Indigestion, Infectious disease, Neuropathy (HCC), On home oxygen therapy, Other specified symptom associated with female genital organs, Pain (9/13/2016), PND (post-nasal drip), Pneumonia, Psychiatric problem (9/13/2016), RLS (restless legs syndrome), Sleep apnea, and Unspecified urinary incontinence.    ROS  No chest pain, no shortness of breath, no abdominal pain       Objective:     /93 (BP Location: Left arm, Patient Position: Sitting, BP Cuff Size: Adult)   Pulse 74   Temp 37.3 °C (99.1 °F) (Temporal)   Resp 16   Ht 1.651 m (5' 5\")   Wt 105.2 kg (232 lb)   SpO2 94%  Body mass index is 38.61 kg/m².   Physical Exam:  Constitutional: Alert, no distress.  Skin: Warm, dry, good turgor, no rashes in visible areas.  Eye: Equal, round and reactive, conjunctiva clear, lids normal.  ENMT: Lips without lesions, good dentition, oropharynx clear.  Neck: Trachea midline, no masses, no thyromegaly. No cervical or supraclavicular lymphadenopathy  Respiratory: Unlabored respiratory effort, lungs clear to auscultation, no wheezes, no ronchi.  Cardiovascular: Normal S1, S2, no murmur, no edema.  Abdomen: Soft, non-tender, no masses, no hepatosplenomegaly.  Psych: Alert and oriented x3, normal affect and mood.  MSK: LLE with mild edema, erythema, to upper calf. "       Assessment and Plan:   The following treatment plan was discussed    1. Need for vaccination  - Tdap Vaccine =>6YO IM  - Zoster Vac Recomb Adjuvanted (SHINGRIX) 50 MCG/0.5ML Recon Susp; 0.5 mL by Intramuscular route Once for 1 dose.  Dispense: 0.5 mL; Refill: 0    2. Chronic respiratory failure with hypoxia (HCC)  - Misc. Devices Misc; Portable oxygen compressor and associated supplies.  Patient may keep to oxygen tanks at home.  Return all other oxygen tanks.  Dispense: 1 Each; Refill: 0    3. Cellulitis of left leg  Start doxycycline, if no improvement, start amoxicillin    4. Tobacco dependence  Encouraged to quit smoking completely    5. CKD (chronic kidney disease), stage IV (HCC)  Recheck labs.     6. DM type 2, uncontrolled, with renal complications (HCC)  Well controlled.   - Comp Metabolic Panel; Future  - MICROALBUMIN CREAT RATIO URINE; Future  - Lipid Profile; Future    7. Anemia requiring transfusions  - CBC WITH DIFFERENTIAL; Future  - IRON/TOTAL IRON BIND; Future    8. Hypothyroidism, unspecified type  - TSH WITH REFLEX TO FT4; Future      Followup: Return for Follow-up as scheduled.

## 2019-08-15 NOTE — ASSESSMENT & PLAN NOTE
Off and on since March patient has been struggling with cellulitis swelling and redness and pain of her left lower extremity from her foot to her mid calf.  She was advised to wear compression stockings and so she does use compression stockings.  She is currently on antibiotics for abscessed teeth: Amoxicillin 500 mg she noticed that when she started taking this her foot symptoms got better she will need to probably take stronger antibiotic like doxycycline.  She is allergic to Keflex.  Prescription provided for 14 days if this is not improving we will try bactrim.

## 2019-08-18 ENCOUNTER — HOSPITAL ENCOUNTER (OUTPATIENT)
Facility: MEDICAL CENTER | Age: 69
End: 2019-08-18
Attending: FAMILY MEDICINE
Payer: MEDICARE

## 2019-08-18 PROCEDURE — 82274 ASSAY TEST FOR BLOOD FECAL: CPT

## 2019-08-20 ENCOUNTER — TELEPHONE (OUTPATIENT)
Dept: MEDICAL GROUP | Facility: PHYSICIAN GROUP | Age: 69
End: 2019-08-20

## 2019-08-21 ENCOUNTER — HOSPITAL ENCOUNTER (OUTPATIENT)
Dept: LAB | Facility: MEDICAL CENTER | Age: 69
End: 2019-08-21
Attending: INTERNAL MEDICINE
Payer: MEDICARE

## 2019-08-21 ENCOUNTER — PATIENT OUTREACH (OUTPATIENT)
Dept: HEALTH INFORMATION MANAGEMENT | Facility: OTHER | Age: 69
End: 2019-08-21

## 2019-08-21 ENCOUNTER — HOSPITAL ENCOUNTER (OUTPATIENT)
Dept: LAB | Facility: MEDICAL CENTER | Age: 69
End: 2019-08-21
Attending: FAMILY MEDICINE
Payer: MEDICARE

## 2019-08-21 DIAGNOSIS — E03.9 HYPOTHYROIDISM, UNSPECIFIED TYPE: ICD-10-CM

## 2019-08-21 DIAGNOSIS — I50.20 ACC/AHA STAGE C SYSTOLIC HEART FAILURE (HCC): ICD-10-CM

## 2019-08-21 DIAGNOSIS — D64.9 ANEMIA REQUIRING TRANSFUSIONS: ICD-10-CM

## 2019-08-21 DIAGNOSIS — J44.9 CHRONIC OBSTRUCTIVE PULMONARY DISEASE, UNSPECIFIED COPD TYPE (HCC): ICD-10-CM

## 2019-08-21 DIAGNOSIS — I34.2 NON-RHEUMATIC MITRAL VALVE STENOSIS: ICD-10-CM

## 2019-08-21 DIAGNOSIS — J96.11 CHRONIC RESPIRATORY FAILURE WITH HYPOXIA (HCC): ICD-10-CM

## 2019-08-21 LAB
ALBUMIN SERPL BCP-MCNC: 3.7 G/DL (ref 3.2–4.9)
ALBUMIN/GLOB SERPL: 1.4 G/DL
ALP SERPL-CCNC: 48 U/L (ref 30–99)
ALT SERPL-CCNC: 13 U/L (ref 2–50)
ANION GAP SERPL CALC-SCNC: 8 MMOL/L (ref 0–11.9)
ANION GAP SERPL CALC-SCNC: 9 MMOL/L (ref 0–11.9)
ANISOCYTOSIS BLD QL SMEAR: ABNORMAL
AST SERPL-CCNC: 17 U/L (ref 12–45)
BASOPHILS # BLD AUTO: 0.7 % (ref 0–1.8)
BASOPHILS # BLD: 0.07 K/UL (ref 0–0.12)
BILIRUB SERPL-MCNC: 0.4 MG/DL (ref 0.1–1.5)
BUN SERPL-MCNC: 25 MG/DL (ref 8–22)
BUN SERPL-MCNC: 25 MG/DL (ref 8–22)
CALCIUM SERPL-MCNC: 9.6 MG/DL (ref 8.5–10.5)
CALCIUM SERPL-MCNC: 9.6 MG/DL (ref 8.5–10.5)
CHLORIDE SERPL-SCNC: 103 MMOL/L (ref 96–112)
CHLORIDE SERPL-SCNC: 105 MMOL/L (ref 96–112)
CHOLEST SERPL-MCNC: 116 MG/DL (ref 100–199)
CO2 SERPL-SCNC: 27 MMOL/L (ref 20–33)
CO2 SERPL-SCNC: 27 MMOL/L (ref 20–33)
COMMENT 1642: NORMAL
CREAT SERPL-MCNC: 1.87 MG/DL (ref 0.5–1.4)
CREAT SERPL-MCNC: 1.88 MG/DL (ref 0.5–1.4)
CREAT UR-MCNC: 56.4 MG/DL
EOSINOPHIL # BLD AUTO: 0.37 K/UL (ref 0–0.51)
EOSINOPHIL NFR BLD: 3.7 % (ref 0–6.9)
ERYTHROCYTE [DISTWIDTH] IN BLOOD BY AUTOMATED COUNT: 58.3 FL (ref 35.9–50)
GLOBULIN SER CALC-MCNC: 2.7 G/DL (ref 1.9–3.5)
GLUCOSE SERPL-MCNC: 146 MG/DL (ref 65–99)
GLUCOSE SERPL-MCNC: 148 MG/DL (ref 65–99)
HCT VFR BLD AUTO: 28.2 % (ref 37–47)
HDLC SERPL-MCNC: 36 MG/DL
HGB BLD-MCNC: 8 G/DL (ref 12–16)
IMM GRANULOCYTES # BLD AUTO: 0.06 K/UL (ref 0–0.11)
IMM GRANULOCYTES NFR BLD AUTO: 0.6 % (ref 0–0.9)
IRON SATN MFR SERPL: 10 % (ref 15–55)
IRON SERPL-MCNC: 28 UG/DL (ref 40–170)
LDLC SERPL CALC-MCNC: 60 MG/DL
LYMPHOCYTES # BLD AUTO: 0.82 K/UL (ref 1–4.8)
LYMPHOCYTES NFR BLD: 8.2 % (ref 22–41)
MACROCYTES BLD QL SMEAR: ABNORMAL
MCH RBC QN AUTO: 27.3 PG (ref 27–33)
MCHC RBC AUTO-ENTMCNC: 28.4 G/DL (ref 33.6–35)
MCV RBC AUTO: 96.2 FL (ref 81.4–97.8)
MICROALBUMIN UR-MCNC: 156.1 MG/DL
MICROALBUMIN/CREAT UR: 2768 MG/G (ref 0–30)
MONOCYTES # BLD AUTO: 0.85 K/UL (ref 0–0.85)
MONOCYTES NFR BLD AUTO: 8.5 % (ref 0–13.4)
MORPHOLOGY BLD-IMP: NORMAL
NEUTROPHILS # BLD AUTO: 7.86 K/UL (ref 2–7.15)
NEUTROPHILS NFR BLD: 78.3 % (ref 44–72)
NRBC # BLD AUTO: 0 K/UL
NRBC BLD-RTO: 0 /100 WBC
PLATELET # BLD AUTO: 334 K/UL (ref 164–446)
PLATELET BLD QL SMEAR: NORMAL
PMV BLD AUTO: 9.2 FL (ref 9–12.9)
POLYCHROMASIA BLD QL SMEAR: NORMAL
POTASSIUM SERPL-SCNC: 4.5 MMOL/L (ref 3.6–5.5)
POTASSIUM SERPL-SCNC: 4.5 MMOL/L (ref 3.6–5.5)
PROT SERPL-MCNC: 6.4 G/DL (ref 6–8.2)
RBC # BLD AUTO: 2.93 M/UL (ref 4.2–5.4)
RBC BLD AUTO: PRESENT
SODIUM SERPL-SCNC: 139 MMOL/L (ref 135–145)
SODIUM SERPL-SCNC: 140 MMOL/L (ref 135–145)
TIBC SERPL-MCNC: 269 UG/DL (ref 250–450)
TRIGL SERPL-MCNC: 100 MG/DL (ref 0–149)
TSH SERPL DL<=0.005 MIU/L-ACNC: 1.91 UIU/ML (ref 0.38–5.33)
WBC # BLD AUTO: 10 K/UL (ref 4.8–10.8)

## 2019-08-21 PROCEDURE — 83550 IRON BINDING TEST: CPT

## 2019-08-21 PROCEDURE — 80053 COMPREHEN METABOLIC PANEL: CPT

## 2019-08-21 PROCEDURE — 85025 COMPLETE CBC W/AUTO DIFF WBC: CPT

## 2019-08-21 PROCEDURE — 82570 ASSAY OF URINE CREATININE: CPT

## 2019-08-21 PROCEDURE — 80048 BASIC METABOLIC PNL TOTAL CA: CPT

## 2019-08-21 PROCEDURE — 82043 UR ALBUMIN QUANTITATIVE: CPT

## 2019-08-21 PROCEDURE — 84443 ASSAY THYROID STIM HORMONE: CPT

## 2019-08-21 PROCEDURE — 80061 LIPID PANEL: CPT

## 2019-08-21 PROCEDURE — 83540 ASSAY OF IRON: CPT

## 2019-08-21 PROCEDURE — 36415 COLL VENOUS BLD VENIPUNCTURE: CPT

## 2019-08-21 NOTE — TELEPHONE ENCOUNTER
[facility] is needing a new order for: Portable Oxygen Concentrator. The order must specify this and the DX code. Thanks

## 2019-08-22 ENCOUNTER — TELEPHONE (OUTPATIENT)
Dept: MEDICAL GROUP | Facility: PHYSICIAN GROUP | Age: 69
End: 2019-08-22

## 2019-08-22 DIAGNOSIS — Z12.11 SCREENING FOR COLON CANCER: ICD-10-CM

## 2019-08-22 NOTE — TELEPHONE ENCOUNTER
Order for portable oxygen concentrator ready to [facility] to [facility].   Kavitha Mccall M.D.     Faxed over order to [facility]

## 2019-08-22 NOTE — PROGRESS NOTES
A 68-year-old female was an emergent admission to [facility] from 6/3/2019 to 6/8/2019 for Acute Exacerbation of COPD. Patient readmitted on 6/14/2019 for Shortness of Breath, and was discharged on 6/21/209. Patient was an emergent readmission to [facility] from 7/20/2019 to 7/22/2019 to treat Acute on chronic combined systolic (congestive) and diastolic (congestive) heart failure. IHD visited the patient bedside. The patient was discharged Home. The patient was ordered to start/continue to take the following medications: Sulfamethoxazole and Prednisone. The patient successfully filled all medications.     The patient was ordered to follow-up with Specialist, Outpatient Services, and PCP. The patient had the following appointments:     1) 7/24/2019 @ 8:00 Laboratory Services , laboratory - CONFIRMED AS KEPT     2) 7/26/2019 @ 8:00 Laboratory Services , laboratory - PATIENT NO SHOW     3) 8/5/2019 @ 11:15 [facility] - PATIENT CANCELLED     4) 8/6/2019 @ 10:15 Eduardo Hernandez , cardiology - CONFIRMED AS KEPT     5) 8/14/2019 @ 10:00 Eduardo Hernandez , cardiology - PATIENT RESCHEDULED     6) 8/15/2019 @ 3:20 Kavitha Mccall general/family practice - CONFIRMED AS KEPT    The patient has future appointments scheduled for:     1) 9/3/2019 @ 11:00 Najjar, Fadi, nephrology - SCHEDULED     2) 9/3/2019 @ 1:45 Eduardo Hernandez , cardiology - SCHEDULED     3) 9/18/2019 @ 11:00 Kavitha Mccall general/family practice - SCHEDULED    IHD followed the patient for a total of 31 days and patient was receptive to IHD services. PPS screening was conducted at 70%.

## 2019-08-22 NOTE — TELEPHONE ENCOUNTER
Lab received a call from [employer]. They received a FIT test but they have no orders. I have entered and pended the orders. thanks

## 2019-08-23 LAB — HEMOCCULT STL QL IA: NEGATIVE

## 2019-08-24 NOTE — RESULT ENCOUNTER NOTE
Please advise patient that I reviewed lab results. Anemia and iron are slightly improved but levels are still low. This may be due to the low kidney function. She may benefit from an iron infusion. I've got an iron infusion order ready to fax. Low kidney function with be reviewed by the kidney specialist at the 9/3 appt.   Blood sugar is high so avoid high sugar food and drink.   Other labs are good.   Kavitha Mccall M.D.

## 2019-08-30 NOTE — TELEPHONE ENCOUNTER
*Medication is currently D/C'd on patients med list*  Was the patient seen in the last year in this department? Yes    Does patient have an active prescription for medications requested? No     Received Request Via: Pharmacy    Pt met protocol?: Yes     Last OV 08/15/19

## 2019-09-03 ENCOUNTER — TELEPHONE (OUTPATIENT)
Dept: MEDICAL GROUP | Facility: PHYSICIAN GROUP | Age: 69
End: 2019-09-03

## 2019-09-03 ENCOUNTER — TELEMEDICINE2 (OUTPATIENT)
Dept: NEPHROLOGY | Facility: MEDICAL CENTER | Age: 69
End: 2019-09-03
Payer: MEDICARE

## 2019-09-03 VITALS
BODY MASS INDEX: 37.99 KG/M2 | TEMPERATURE: 98.6 F | SYSTOLIC BLOOD PRESSURE: 108 MMHG | WEIGHT: 228 LBS | OXYGEN SATURATION: 93 % | DIASTOLIC BLOOD PRESSURE: 60 MMHG | HEART RATE: 82 BPM | RESPIRATION RATE: 12 BRPM | HEIGHT: 65 IN

## 2019-09-03 DIAGNOSIS — D50.9 IRON DEFICIENCY ANEMIA, UNSPECIFIED IRON DEFICIENCY ANEMIA TYPE: ICD-10-CM

## 2019-09-03 DIAGNOSIS — E11.29 TYPE 2 DIABETES MELLITUS WITH MICROALBUMINURIA, WITH LONG-TERM CURRENT USE OF INSULIN (HCC): ICD-10-CM

## 2019-09-03 DIAGNOSIS — N18.30 STAGE 3 CHRONIC KIDNEY DISEASE (HCC): ICD-10-CM

## 2019-09-03 DIAGNOSIS — R80.9 PROTEINURIA, UNSPECIFIED TYPE: ICD-10-CM

## 2019-09-03 DIAGNOSIS — Z79.4 TYPE 2 DIABETES MELLITUS WITH MICROALBUMINURIA, WITH LONG-TERM CURRENT USE OF INSULIN (HCC): ICD-10-CM

## 2019-09-03 DIAGNOSIS — R80.9 TYPE 2 DIABETES MELLITUS WITH MICROALBUMINURIA, WITH LONG-TERM CURRENT USE OF INSULIN (HCC): ICD-10-CM

## 2019-09-03 DIAGNOSIS — R60.9 EDEMA, UNSPECIFIED TYPE: ICD-10-CM

## 2019-09-03 DIAGNOSIS — I10 ESSENTIAL HYPERTENSION: ICD-10-CM

## 2019-09-03 PROCEDURE — 99999 PR NO CHARGE: CPT | Performed by: INTERNAL MEDICINE

## 2019-09-03 RX ORDER — LISINOPRIL 5 MG/1
5 TABLET ORAL DAILY
Qty: 30 TAB | Refills: 11 | Status: SHIPPED | OUTPATIENT
Start: 2019-09-03 | End: 2020-04-21

## 2019-09-03 ASSESSMENT — ENCOUNTER SYMPTOMS
FEVER: 0
COUGH: 0
CHILLS: 0
NAUSEA: 0
SHORTNESS OF BREATH: 0
VOMITING: 0
HYPERTENSION: 1

## 2019-09-03 NOTE — TELEPHONE ENCOUNTER
----- Message from Kavitha Mccall M.D. sent at 8/23/2019  5:39 PM PDT -----  Please advise patient that I reviewed lab results. Anemia and iron are slightly improved but levels are still low. This may be due to the low kidney function. She may benefit from an iron infusion. I've got an iron infusion order ready to fax. Low kidney function with be reviewed by the kidney specialist at the 9/3 appt.   Blood sugar is high so avoid high sugar food and drink.   Other labs are good.   Kavitha Mccall M.D.

## 2019-09-03 NOTE — PROGRESS NOTES
"Subjective:      Priya Callahan is a 68 y.o. female who presents with Hypertension and Chronic Kidney Disease            Hypertension   This is a chronic problem. The current episode started more than 1 year ago. The problem is unchanged. The problem is controlled. Associated symptoms include peripheral edema. Pertinent negatives include no chest pain, malaise/fatigue or shortness of breath. Risk factors for coronary artery disease include diabetes mellitus, obesity and post-menopausal state. Past treatments include calcium channel blockers and diuretics. The current treatment provides significant improvement. There are no compliance problems.  Hypertensive end-organ damage includes kidney disease. Identifiable causes of hypertension include chronic renal disease.   Chronic Kidney Disease   This is a chronic problem. The current episode started more than 1 year ago. The problem occurs constantly. The problem has been waxing and waning. Pertinent negatives include no chest pain, chills, coughing, fever, nausea, urinary symptoms or vomiting.       Review of Systems   Constitutional: Negative for chills, fever and malaise/fatigue.   Respiratory: Negative for cough and shortness of breath.    Cardiovascular: Negative for chest pain and leg swelling.   Gastrointestinal: Negative for nausea and vomiting.   Genitourinary: Negative for dysuria, frequency and urgency.          Objective:     /60   Pulse 82   Temp 37 °C (98.6 °F)   Resp 12   Ht 1.651 m (5' 5\")   Wt 103.4 kg (228 lb)   SpO2 93%   BMI 37.94 kg/m²      Physical Exam   Constitutional: She is oriented to person, place, and time. She appears well-developed and well-nourished. No distress. Nasal cannula in place.   HENT:   Head: Normocephalic and atraumatic.   Right Ear: External ear normal.   Left Ear: External ear normal.   Nose: Nose normal.   Eyes: Conjunctivae are normal. Right eye exhibits no discharge. Left eye exhibits no discharge. No " "scleral icterus.   Cardiovascular: Normal rate and regular rhythm.   Pulmonary/Chest: Effort normal and breath sounds normal. No respiratory distress.   Musculoskeletal: She exhibits edema. She exhibits no tenderness or deformity.   Neurological: She is alert and oriented to person, place, and time. No cranial nerve deficit.   Skin: Skin is warm and dry. She is not diaphoretic. No erythema.   Psychiatric: She has a normal mood and affect. Her behavior is normal.   Nursing note and vitals reviewed.     exam was done by the help of the nurse at the remote facility.  Past Medical History:   Diagnosis Date   • Arthritis     \"everywhere\"   • ASTHMA    • Backpain     chronic back pain   • Breath shortness     \"only with flare up with allergies\"   • Bronchitis     as a child   • Congestive heart failure (HCC) 2013    related to pulmonary failure   • COPD    • Diabetes     Type 2   • Disorder of thyroid     Hypothyroid   • Fall    • Fibromyalgia    • GERD (gastroesophageal reflux disease)    • Heart burn    • Hepatitis C     \"I have markers in blood but not active\"   • Hypertension    • Indigestion    • Infectious disease     Hepatitis C   • Neuropathy (HCC)     bilat feet   • On home oxygen therapy    • Other specified symptom associated with female genital organs     Hysterectomy at age 22yrs   • Pain 9/13/2016    \"pain everywhere\"   • PND (post-nasal drip)    • Pneumonia     as a child   • Psychiatric problem 9/13/2016    PTSD   • RLS (restless legs syndrome)    • Sleep apnea     does not wear CPAP, \"can't do it\"   • Unspecified urinary incontinence      Social History     Socioeconomic History   • Marital status:      Spouse name: Not on file   • Number of children: Not on file   • Years of education: Not on file   • Highest education level: Not on file   Occupational History   • Not on file   Social Needs   • Financial resource strain: Not on file   • Food insecurity:     Worry: Not on file     Inability: Not " on file   • Transportation needs:     Medical: Not on file     Non-medical: Not on file   Tobacco Use   • Smoking status: Current Every Day Smoker     Packs/day: 0.25     Years: 50.00     Pack years: 12.50     Types: Cigarettes     Last attempt to quit: 3/20/2019     Years since quittin.4   • Smokeless tobacco: Never Used   Substance and Sexual Activity   • Alcohol use: No     Alcohol/week: 0.0 oz   • Drug use: No   • Sexual activity: Never   Lifestyle   • Physical activity:     Days per week: Not on file     Minutes per session: Not on file   • Stress: Not on file   Relationships   • Social connections:     Talks on phone: Not on file     Gets together: Not on file     Attends L[employer] service: Not on file     Active member of club or organization: Not on file     Attends meetings of clubs or organizations: Not on file     Relationship status: Not on file   • Intimate partner violence:     Fear of current or ex partner: Not on file     Emotionally abused: Not on file     Physically abused: Not on file     Forced sexual activity: Not on file   Other Topics Concern   • Not on file   Social History Narrative   • Not on file     Family History   Problem Relation Age of Onset   • Lung Disease Mother    • Alcohol/Drug Mother    • Heart Disease Mother    • Cancer Father    • Cancer Brother    • Diabetes Brother    • Diabetes Maternal Grandmother    • Stroke Paternal Grandmother    • Heart Disease Paternal Grandmother      Recent Labs     19  0223 19  0323 19  0339  19  0454 19  0805 19  0940 19  0941   ALBUMIN 3.3 3.3 3.1*   < > 3.6 3.7  --  3.7   HDL  --   --   --   --   --   --   --  36*   TRIGLYCERIDE  --   --   --   --   --   --   --  100   SODIUM 138 136 140   < > 137 136  138 140 139   POTASSIUM 4.5 4.2 4.3   < > 4.8 4.8  4.7 4.5 4.5   CHLORIDE 99 101 104   < > 99 100  101 105 103   CO2 30 26 27   < > 28 27  26 27 27   BUN 45* 49* 53*   < > 27* 73*  77* 25* 25*    CREATININE 1.89* 1.72* 1.90*   < > 2.00* 3.05*  3.08* 1.88* 1.87*   PHOSPHORUS 4.3 5.0* 4.6*  --   --   --   --   --     < > = values in this interval not displayed.               Assessment/Plan:     1. Stage 3 chronic kidney disease (HCC)  Creatinine is back at baseline  No uremic symptoms  Renal dose of medication  Avoid nephrotoxins  Continue same medication regimen      2. Essential hypertension  Controlled  Continue same medication regimen  Continue low-sodium diet      3. Type 2 diabetes mellitus with microalbuminuria, with long-term current use of insulin ([facility])  Continue to optimize diabetes control    4. Proteinuria, unspecified type  Start ACE inhibitor    5. Edema, unspecified type  Improved  Continue low-sodium diet    6. Iron deficiency anemia, unspecified iron deficiency anemia type  Increase iron supplement

## 2019-09-05 ENCOUNTER — TELEPHONE (OUTPATIENT)
Dept: MEDICAL GROUP | Facility: PHYSICIAN GROUP | Age: 69
End: 2019-09-05

## 2019-09-05 NOTE — TELEPHONE ENCOUNTER
[facility] called stating that they need a PA for the iron infusion with codes , 27079, 65679    PA submitted via fax      [facility] called also needing O/V notes, CBC and a desired hemoglobin

## 2019-09-11 DIAGNOSIS — D64.9 ANEMIA REQUIRING TRANSFUSIONS: ICD-10-CM

## 2019-09-11 NOTE — TELEPHONE ENCOUNTER
Phone Number Called: [phone]    Call outcome: spoke to Heber tan regarding message below    Message: Desired HB is 12   Kavitha Mccall M.D.     Do you want to treat the patient off of the old hemoglobin or do you want them to repeat a CBC and treat off that result?

## 2019-09-12 NOTE — TELEPHONE ENCOUNTER
Phone Number Called: [phone]    Call outcome: lvm notfiying them regarding message below    Message: Recheck please. Order done.   Kavitha Mccall M.D.

## 2019-09-18 ENCOUNTER — OFFICE VISIT (OUTPATIENT)
Dept: MEDICAL GROUP | Facility: PHYSICIAN GROUP | Age: 69
End: 2019-09-18
Payer: MEDICARE

## 2019-09-18 VITALS
OXYGEN SATURATION: 93 % | BODY MASS INDEX: 38.82 KG/M2 | RESPIRATION RATE: 16 BRPM | HEART RATE: 69 BPM | DIASTOLIC BLOOD PRESSURE: 64 MMHG | WEIGHT: 233 LBS | TEMPERATURE: 97.8 F | SYSTOLIC BLOOD PRESSURE: 120 MMHG | HEIGHT: 65 IN

## 2019-09-18 DIAGNOSIS — J44.9 CHRONIC OBSTRUCTIVE PULMONARY DISEASE, UNSPECIFIED COPD TYPE (HCC): ICD-10-CM

## 2019-09-18 DIAGNOSIS — E66.09 CLASS 2 OBESITY DUE TO EXCESS CALORIES WITHOUT SERIOUS COMORBIDITY WITH BODY MASS INDEX (BMI) OF 36.0 TO 36.9 IN ADULT: ICD-10-CM

## 2019-09-18 DIAGNOSIS — M47.16 LUMBAR SPONDYLOSIS WITH MYELOPATHY: ICD-10-CM

## 2019-09-18 DIAGNOSIS — M15.9 PRIMARY OSTEOARTHRITIS INVOLVING MULTIPLE JOINTS: ICD-10-CM

## 2019-09-18 DIAGNOSIS — E66.9 OBESITY (BMI 30-39.9): ICD-10-CM

## 2019-09-18 DIAGNOSIS — J96.11 CHRONIC RESPIRATORY FAILURE WITH HYPOXIA (HCC): ICD-10-CM

## 2019-09-18 DIAGNOSIS — G89.4 CHRONIC PAIN SYNDROME: Chronic | ICD-10-CM

## 2019-09-18 DIAGNOSIS — Z79.891 CHRONIC USE OF OPIATE DRUG FOR THERAPEUTIC PURPOSE: ICD-10-CM

## 2019-09-18 DIAGNOSIS — N18.4 CKD (CHRONIC KIDNEY DISEASE), STAGE IV (HCC): ICD-10-CM

## 2019-09-18 DIAGNOSIS — Z23 NEED FOR VACCINATION: ICD-10-CM

## 2019-09-18 DIAGNOSIS — D64.9 ANEMIA, UNSPECIFIED TYPE: ICD-10-CM

## 2019-09-18 DIAGNOSIS — I10 CHRONIC HYPERTENSION: Chronic | ICD-10-CM

## 2019-09-18 PROBLEM — L03.116 CELLULITIS OF LEFT LEG: Status: RESOLVED | Noted: 2019-07-20 | Resolved: 2019-09-18

## 2019-09-18 PROBLEM — E66.01 CLASS 3 SEVERE OBESITY DUE TO EXCESS CALORIES WITH SERIOUS COMORBIDITY AND BODY MASS INDEX (BMI) OF 40.0 TO 44.9 IN ADULT (HCC): Status: RESOLVED | Noted: 2018-12-20 | Resolved: 2019-09-18

## 2019-09-18 PROCEDURE — G0008 ADMIN INFLUENZA VIRUS VAC: HCPCS | Performed by: FAMILY MEDICINE

## 2019-09-18 PROCEDURE — 99214 OFFICE O/P EST MOD 30 MIN: CPT | Mod: 25 | Performed by: FAMILY MEDICINE

## 2019-09-18 PROCEDURE — 90662 IIV NO PRSV INCREASED AG IM: CPT | Performed by: FAMILY MEDICINE

## 2019-09-18 RX ORDER — FERROUS GLUCONATE 324(37.5)
TABLET ORAL
Refills: 1 | COMMUNITY
Start: 2019-09-11 | End: 2019-09-18

## 2019-09-18 RX ORDER — OXYCODONE HYDROCHLORIDE 10 MG/1
10 TABLET ORAL 3 TIMES DAILY PRN
Qty: 85 TAB | Refills: 0 | Status: SHIPPED | OUTPATIENT
Start: 2019-09-18 | End: 2019-09-18 | Stop reason: SDUPTHER

## 2019-09-18 RX ORDER — OXYCODONE HYDROCHLORIDE 10 MG/1
10 TABLET ORAL 3 TIMES DAILY PRN
Qty: 85 TAB | Refills: 0 | Status: SHIPPED | OUTPATIENT
Start: 2019-10-18 | End: 2019-09-18 | Stop reason: SDUPTHER

## 2019-09-18 RX ORDER — OXYCODONE HYDROCHLORIDE 10 MG/1
10 TABLET ORAL 3 TIMES DAILY PRN
Qty: 85 TAB | Refills: 0 | Status: SHIPPED | OUTPATIENT
Start: 2019-11-18 | End: 2019-12-18

## 2019-09-18 NOTE — ASSESSMENT & PLAN NOTE
Patient presents for refill of oxycodone immediate release 10 mg TID prn chronic pain due to arthritis and spondylosis. Encouraged patient to wean down. Can go down to 85 pills every 30 days. Refills for 3 months provided.     Also takes flexeril, cymbalta, elavil and tylenol  , UDS reviewed with no inconsistencies.     Refills for 3 months provided.   Refills at clinic visits only  Advised no use of alcohol, thc or illegal drugs  Advised on risk of death, sedation, constipation, respiratory depression.   Advised in next few years we plan to refer all opioid pain medication refills to pain management.

## 2019-09-18 NOTE — PROGRESS NOTES
Subjective:   Chaitanya Cabello is a 68 y.o. female here today for evaluation and management of:     Chronic use of opiate drug for therapeutic purpose  Patient presents for refill of oxycodone immediate release 10 mg TID prn chronic pain due to arthritis and spondylosis. Encouraged patient to wean down. Can go down to 85 pills every 30 days. Refills for 3 months provided.     Also takes flexeril, cymbalta, elavil and tylenol  , UDS reviewed with no inconsistencies.     Refills for 3 months provided.   Refills at clinic visits only  Advised no use of alcohol, thc or illegal drugs  Advised on risk of death, sedation, constipation, respiratory depression.   Advised in next few years we plan to refer all opioid pain medication refills to pain management.     Anemia  Due CKD stage IV, low iron, scheduled for iron infusion at [facility].     Chronic hypertension  Chronic condition, BP today is controlled 120/64 and HR 69 on lisinopril 5 mg daily. She was started on diltiazem 120 mg about a month ago. But ran out of it about a week ago.   Since bp well controlled on the lisinopril, can wait till her cardiology appt in nov to discuss restartign diltiazem with her cardiologist.     CKD (chronic kidney disease), stage IV ([facility])  Recent labs show improvement in her renal function  Results for CHAITANYA CABELLO (MRN [id number]) as of 9/18/2019 11:06   Ref. Range 7/24/2019 08:05 7/24/2019 08:05 8/18/2019 09:00 8/21/2019 09:40 8/21/2019 09:41   Creatinine Latest Ref Range: 0.50 - 1.40 mg/dL 3.08 (H) 3.05 (H)  1.88 (H) 1.87 (H)   GFR If  Latest Ref Range: >60 mL/min/1.73 m 2 18 (A) 18 (A)  32 (A) 32 (A)   GFR If Non  Latest Ref Range: >60 mL/min/1.73 m 2 15 (A) 15 (A)  26 (A) 27 (A)   Continues to follow with her nephrologist  Continues lisinopril 5 mg for proteinuria.   Improved LE edema no longer needing lasix.     Class 2 obesity due to excess calories without serious comorbidity with  body mass index (BMI) of 36.0 to 36.9 in adult  Encouraged to con         Current medicines (including changes today)  Current Outpatient Medications   Medication Sig Dispense Refill   • [START ON 11/18/2019] oxyCODONE immediate release (ROXICODONE) 10 MG immediate release tablet Take 1 Tab by mouth 3 times a day as needed for Moderate Pain for up to 30 days. 85 Tab 0   • Misc. Devices Misc Portable oxygen simply go mini 4L oxygen supplementation. On demand. Fax to preferred home care 1 Each 0   • lisinopril (PRINIVIL) 5 MG Tab Take 1 Tab by mouth every day. 30 Tab 11   • nystatin/triamcinolone (MYCOLOG) 452440-0.1 UNIT/GM-% Cream APPLY A THIN LAYER TO FUNGAL RASH ONCE DAILY AS NEEDED 30 g 0   • ferrous gluconate (FERGON) 324 (38 Fe) MG Tab Take 1 Tab by mouth every morning with breakfast. 30 Tab 1   • Continuous Blood Gluc  (FREESTYLE EDMUNDO 14 DAY READER) Device 1 Each by Does not apply route 1 time daily as needed. 1 Device 0   • Continuous Blood Gluc Sensor (FREESTYLE EDMUNDO 14 DAY SENSOR) Misc 1 Each by Does not apply route every 14 days. 2 Each 11   • cyclobenzaprine (FLEXERIL) 10 MG Tab Take 1 Tab by mouth every bedtime. 90 Tab 3   • Diclofenac Sodium 1 % Gel Apply 2 g to skin as directed 2 times a day as needed (for pain). 90 g 3   • furosemide (LASIX) 20 MG Tab Take 1 Tab by mouth 2 times a day. (Patient taking differently: Take 20 mg by mouth every day.) 60 Tab 11   • ipratropium-albuterol (DUONEB) 0.5-2.5 (3) MG/3ML nebulizer solution 3 mL by Nebulization route every 6 hours as needed for Shortness of Breath. 30 Bullet 0   • potassium chloride SA (KDUR) 20 MEQ Tab CR Take 1 Tab by mouth every day. 60 Tab 1   • DILTIAZem CD (CARDIZEM CD) 120 MG CAPSULE SR 24 HR Take 1 Cap by mouth every day. 30 Cap 2   • LACTULOSE PO Take  by mouth as needed.     • traZODone (DESYREL) 150 MG Tab Take 150 mg by mouth every bedtime.  3   • Fluticasone-Umeclidin-Vilant (TRELEGY ELLIPTA) 100-62.5-25 MCG/INH AEROSOL  "POWDER, BREATH ACTIVATED Inhale 1 Puff by mouth every day.     • insulin glargine (LANTUS) 100 UNIT/ML Solution Inject 20 Units as instructed every evening.     • rosuvastatin (CRESTOR) 10 MG Tab Take 10 mg by mouth every morning.     • albuterol 108 (90 Base) MCG/ACT Aero Soln inhalation aerosol Inhale 2 Puffs by mouth every four hours as needed for Shortness of Breath.  3   • levothyroxine (SYNTHROID) 75 MCG Tab TAKE 1 TABLET BY MOUTH DAILY 90 Tab 3   • amitriptyline (ELAVIL) 50 MG Tab Take 50 mg by mouth every bedtime.  3   • DULoxetine (CYMBALTA) 60 MG Cap DR Particles delayed-release capsule TAKE 1 CAPSULE BY MOUTH DAILY 90 Cap 1     No current facility-administered medications for this visit.      She  has a past medical history of Arthritis, ASTHMA, Backpain, Breath shortness, Bronchitis, Congestive heart failure (HCC) (2013), COPD, Diabetes, Disorder of thyroid, Fall, Fibromyalgia, GERD (gastroesophageal reflux disease), Heart burn, Hepatitis C, Hypertension, Indigestion, Infectious disease, Neuropathy (HCC), On home oxygen therapy, Other specified symptom associated with female genital organs, Pain (9/13/2016), PND (post-nasal drip), Pneumonia, Psychiatric problem (9/13/2016), RLS (restless legs syndrome), Sleep apnea, and Unspecified urinary incontinence.    ROS  No chest pain, no shortness of breath, no abdominal pain       Objective:     /64 (BP Location: Left arm, Patient Position: Sitting, BP Cuff Size: Adult)   Pulse 69   Temp 36.6 °C (97.8 °F) (Temporal)   Resp 16   Ht 1.651 m (5' 5\")   Wt 105.7 kg (233 lb)   SpO2 93%  Body mass index is 38.77 kg/m².   Physical Exam: in WC on oxygen supplementation via NC  Constitutional: Alert, no distress.  Skin: Warm, dry, good turgor, no rashes in visible areas.  Eye: Equal, round and reactive, conjunctiva clear, lids normal.  ENMT: Lips without lesions, good dentition, oropharynx clear.  Neck: Trachea midline, no masses, no thyromegaly. No cervical " or supraclavicular lymphadenopathy  Respiratory: Unlabored respiratory effort, lungs clear to auscultation, no wheezes, no ronchi.  Cardiovascular: Normal S1, S2, no murmur, no edema.  Abdomen: Soft, non-tender, no masses, no hepatosplenomegaly.  Psych: Alert and oriented x3, normal affect and mood.        Assessment and Plan:   The following treatment plan was discussed    1. Need for vaccination  - Influenza Vaccine, High Dose (65+ Only)    2. Chronic use of opiate drug for therapeutic purpose  - REFERRAL TO PAIN MANAGEMENT    3. Chronic pain syndrome  - REFERRAL TO PAIN MANAGEMENT  - oxyCODONE immediate release (ROXICODONE) 10 MG immediate release tablet; Take 1 Tab by mouth 3 times a day as needed for Moderate Pain for up to 30 days.  Dispense: 85 Tab; Refill: 0    4. Primary osteoarthritis involving multiple joints  - REFERRAL TO PAIN MANAGEMENT  - oxyCODONE immediate release (ROXICODONE) 10 MG immediate release tablet; Take 1 Tab by mouth 3 times a day as needed for Moderate Pain for up to 30 days.  Dispense: 85 Tab; Refill: 0    5. Lumbar spondylosis with myelopathy  - REFERRAL TO PAIN MANAGEMENT  - oxyCODONE immediate release (ROXICODONE) 10 MG immediate release tablet; Take 1 Tab by mouth 3 times a day as needed for Moderate Pain for up to 30 days.  Dispense: 85 Tab; Refill: 0    6. Anemia, unspecified type  Follow up with infusion center for iron infusion.     7. Chronic hypertension  Controlled.     8. CKD (chronic kidney disease), stage IV (HCC)  Improvement seen.       9. Chronic obstructive pulmonary disease, unspecified COPD type (HCC)  Stable.   - Misc. Devices Misc; Portable oxygen simply go mini 4L oxygen supplementation. On demand. Fax to preferred home care  Dispense: 1 Each; Refill: 0    10. Chronic respiratory failure with hypoxia (HCC)  Stable.   Would like to get oxygen portable so she can get back to using a walker and not use the wheel chair.   - Misc. Devices Misc; Portable oxygen simply  go mini 4L oxygen supplementation. On demand. Fax to preferred home care  Dispense: 1 Each; Refill: 0      Followup: Return in about 3 months (around 12/18/2019) for pain med refills, chf, copd, resp failure oxygen order follow up, ckd.

## 2019-09-18 NOTE — ASSESSMENT & PLAN NOTE
Recent labs show improvement in her renal function  Results for CHAITANYA CABELLO (MRN [id number]) as of 9/18/2019 11:06   Ref. Range 7/24/2019 08:05 7/24/2019 08:05 8/18/2019 09:00 8/21/2019 09:40 8/21/2019 09:41   Creatinine Latest Ref Range: 0.50 - 1.40 mg/dL 3.08 (H) 3.05 (H)  1.88 (H) 1.87 (H)   GFR If  Latest Ref Range: >60 mL/min/1.73 m 2 18 (A) 18 (A)  32 (A) 32 (A)   GFR If Non  Latest Ref Range: >60 mL/min/1.73 m 2 15 (A) 15 (A)  26 (A) 27 (A)   Continues to follow with her nephrologist  Continues lisinopril 5 mg for proteinuria.   Improved LE edema no longer needing lasix.

## 2019-09-18 NOTE — ASSESSMENT & PLAN NOTE
Chronic condition, BP today is controlled 120/64 and HR 69 on lisinopril 5 mg daily. She was started on diltiazem 120 mg about a month ago. But ran out of it about a week ago.   Since bp well controlled on the lisinopril, can wait till her cardiology appt in nov to discuss restartign diltiazem with her cardiologist.

## 2019-09-24 ENCOUNTER — TELEPHONE (OUTPATIENT)
Dept: MEDICAL GROUP | Facility: PHYSICIAN GROUP | Age: 69
End: 2019-09-24

## 2019-09-24 NOTE — TELEPHONE ENCOUNTER
VOICEMAIL  1. Caller Name: liliana                      Call Back Number: [phone]    2. Message: pt is out of cyclobenzaprine. Patient states that she usually takes 2 pills at night and the new rx was for 1 pill at night.    3. Patient approves office to leave a detailed voicemail/MyChart message: N\A

## 2019-09-24 NOTE — TELEPHONE ENCOUNTER
Phone Number Called: [phone]    Call outcome: left message for patient to call back regarding message below    Message: Patient was taking Cyclobenzaprine 5mg 2 tabs q HS. A new rx for cyclobenzaprine 10mg 1 tab q HS.

## 2019-10-02 ENCOUNTER — TELEPHONE (OUTPATIENT)
Dept: MEDICAL GROUP | Facility: PHYSICIAN GROUP | Age: 69
End: 2019-10-02

## 2019-10-02 NOTE — TELEPHONE ENCOUNTER
Phone Number Called: [phone]    Call outcome: Kortney     Message: Voice message left said Neyda had questions regarding patient's home health order. Johana is out of office until 10/3/19  will have her call in the morning. Also advised Dr. Mccall and MA are out of the office the next couple of weeks.

## 2019-10-15 RX ORDER — DULOXETIN HYDROCHLORIDE 60 MG/1
CAPSULE, DELAYED RELEASE ORAL
Qty: 90 CAP | Refills: 0 | Status: SHIPPED | OUTPATIENT
Start: 2019-10-15 | End: 2020-02-19 | Stop reason: SDUPTHER

## 2019-10-25 DIAGNOSIS — E78.2 MIXED HYPERLIPIDEMIA: Chronic | ICD-10-CM

## 2019-10-28 RX ORDER — ROSUVASTATIN CALCIUM 10 MG/1
10 TABLET, COATED ORAL EVERY EVENING
Qty: 100 TAB | Refills: 0 | Status: SHIPPED | OUTPATIENT
Start: 2019-10-28 | End: 2020-01-24

## 2019-10-28 NOTE — TELEPHONE ENCOUNTER
Was the patient seen in the last year in this department? Yes    Does patient have an active prescription for medications requested? No     Received Request Via: Pharmacy      Pt met protocol?: Yes    OV 9/19

## 2019-10-31 RX ORDER — LACTULOSE 10 G/15ML
SOLUTION ORAL
Qty: 5400 ML | Refills: 1 | Status: SHIPPED | OUTPATIENT
Start: 2019-10-31 | End: 2020-03-09 | Stop reason: SDUPTHER

## 2019-10-31 NOTE — TELEPHONE ENCOUNTER
Was the patient seen in the last year in this department? Yes    Does patient have an active prescription for medications requested? No     Received Request Via: Pharmacy      Pt met protocol?: Yes, last ov 9/19. Last labs 8/19

## 2019-11-06 ENCOUNTER — TELEPHONE (OUTPATIENT)
Dept: MEDICAL GROUP | Facility: PHYSICIAN GROUP | Age: 69
End: 2019-11-06

## 2019-11-06 NOTE — TELEPHONE ENCOUNTER
Pt called and LVM requesting a letter stating she needs assistance bathing sent to [facility] [employer].

## 2019-11-08 ENCOUNTER — TELEPHONE (OUTPATIENT)
Dept: MEDICAL GROUP | Facility: PHYSICIAN GROUP | Age: 69
End: 2019-11-08

## 2019-11-08 NOTE — TELEPHONE ENCOUNTER
Received a VM requesting information regarding patients Oxygen from [employer] ([employer])     Phone: [phone]  FAX : [phone]    I have attempted to call [employer] back, no answer.    However I recall seeing something on Dr. Farmer desk last week.     Tata do you know if this was faxed back?

## 2019-11-14 NOTE — TELEPHONE ENCOUNTER
Was the patient seen in the last year in this department? Yes    Does patient have an active prescription for medications requested? No     Received Request Via: Pharmacy      Pt met protocol?: Yes   Last ov 9/2019 chantix was last d/c in hospital 2/8/19

## 2019-11-15 RX ORDER — VARENICLINE TARTRATE 1 MG/1
TABLET, FILM COATED ORAL
Qty: 60 TAB | Refills: 1 | Status: SHIPPED
Start: 2019-11-15 | End: 2020-06-15

## 2019-12-05 RX ORDER — TRAZODONE HYDROCHLORIDE 150 MG/1
TABLET ORAL
Qty: 540 TAB | Refills: 0 | Status: SHIPPED | OUTPATIENT
Start: 2019-12-05 | End: 2020-03-17

## 2019-12-06 DIAGNOSIS — I10 CHRONIC HYPERTENSION: ICD-10-CM

## 2019-12-06 DIAGNOSIS — N18.4 CKD (CHRONIC KIDNEY DISEASE), STAGE IV (HCC): ICD-10-CM

## 2019-12-17 ENCOUNTER — OFFICE VISIT (OUTPATIENT)
Dept: MEDICAL GROUP | Facility: PHYSICIAN GROUP | Age: 69
End: 2019-12-17
Payer: MEDICARE

## 2019-12-17 VITALS
BODY MASS INDEX: 37.82 KG/M2 | OXYGEN SATURATION: 94 % | TEMPERATURE: 98 F | SYSTOLIC BLOOD PRESSURE: 116 MMHG | HEIGHT: 65 IN | WEIGHT: 227 LBS | HEART RATE: 81 BPM | DIASTOLIC BLOOD PRESSURE: 82 MMHG

## 2019-12-17 DIAGNOSIS — M15.9 PRIMARY OSTEOARTHRITIS INVOLVING MULTIPLE JOINTS: ICD-10-CM

## 2019-12-17 DIAGNOSIS — E03.9 HYPOTHYROIDISM, UNSPECIFIED TYPE: Chronic | ICD-10-CM

## 2019-12-17 DIAGNOSIS — M47.16 LUMBAR SPONDYLOSIS WITH MYELOPATHY: ICD-10-CM

## 2019-12-17 DIAGNOSIS — N18.4 CKD (CHRONIC KIDNEY DISEASE), STAGE IV (HCC): ICD-10-CM

## 2019-12-17 DIAGNOSIS — G89.4 CHRONIC PAIN SYNDROME: Chronic | ICD-10-CM

## 2019-12-17 PROBLEM — E87.5 HYPERKALEMIA: Status: RESOLVED | Noted: 2019-04-18 | Resolved: 2019-12-17

## 2019-12-17 PROCEDURE — 99214 OFFICE O/P EST MOD 30 MIN: CPT | Performed by: FAMILY MEDICINE

## 2019-12-17 RX ORDER — CALCIPOTRIENE 50 UG/G
CREAM TOPICAL
Refills: 3 | COMMUNITY
Start: 2019-10-11 | End: 2020-06-15

## 2019-12-17 RX ORDER — HYDROXYZINE HYDROCHLORIDE 25 MG/1
25 TABLET, FILM COATED ORAL 3 TIMES DAILY PRN
Qty: 30 TAB | Refills: 5 | Status: SHIPPED
Start: 2019-12-17 | End: 2020-06-15

## 2019-12-17 RX ORDER — FLASH GLUCOSE SCANNING READER
1 EACH MISCELLANEOUS
Qty: 1 DEVICE | Refills: 0 | Status: SHIPPED
Start: 2019-12-17 | End: 2020-06-15

## 2019-12-17 RX ORDER — AMOXICILLIN 500 MG/1
CAPSULE ORAL
Refills: 0 | COMMUNITY
Start: 2019-11-01 | End: 2019-12-17

## 2019-12-17 RX ORDER — FLASH GLUCOSE SENSOR
1 KIT MISCELLANEOUS
Qty: 2 EACH | Refills: 11 | Status: SHIPPED
Start: 2019-12-17 | End: 2020-06-15

## 2019-12-17 RX ORDER — INSULIN GLARGINE 100 [IU]/ML
INJECTION, SOLUTION SUBCUTANEOUS
COMMUNITY
Start: 2019-12-03 | End: 2020-03-31

## 2019-12-17 RX ORDER — CHLORHEXIDINE GLUCONATE ORAL RINSE 1.2 MG/ML
SOLUTION DENTAL
Refills: 0 | COMMUNITY
Start: 2019-11-11 | End: 2019-12-17

## 2019-12-17 NOTE — PROGRESS NOTES
Subjective:   Priya Callahan is a 69 y.o. female here today for evaluation and management of:     CKD (chronic kidney disease), stage IV (HCC)  Recent labs show improvement.   Continues follow up with her nephrologist.   Has anemia associated with CKD stage IV has low iron, had an iron infusion and improved H/H    DM type 2, uncontrolled, with renal complications (HCC)  A1c excellent at 6.8  Patient is taking her medication lantus 20 units qhs  Has CKD stage IV  Is on lisinopril 5 mg  LDL 60 on rosuvastatin 10 mg  Advised her to get her annual eye exam done.     Hypothyroidism  Normal TSH on levothyroxine 75 mcg    Chronic pain syndrome  Going to follow up with her pain specialist Dr. Arteaga for ongoing refills of oxycodone 10 mg now decreased to 85 pill every 30 days. Encouraged to continue to wean down.   Severe pain in multiple joints due to arthritis.          Current medicines (including changes today)  Current Outpatient Medications   Medication Sig Dispense Refill   • LANTUS SOLOSTAR 100 UNIT/ML Solution Pen-injector injection      • hydrOXYzine HCl (ATARAX) 25 MG Tab Take 1 Tab by mouth 3 times a day as needed for Itching. 30 Tab 5   • Continuous Blood Gluc  (FREESTYLE EDMUNDO 14 DAY READER) Device 1 Each by Does not apply route 1 time daily as needed. 1 Device 0   • Continuous Blood Gluc Sensor (FREESTYLE EDMUNDO 14 DAY SENSOR) Misc 1 Each by Does not apply route every 14 days. 2 Each 11   • traZODone (DESYREL) 150 MG Tab TAKE 2 TABLETS BY MOUTH 3 TIMES A  Tab 0   • CHANTIX 1 MG tablet TAKE 1 TABLET BY MOUTH 2 TIMES A DAY. 60 Tab 1   • nystatin/triamcinolone (MYCOLOG) 854165-7.1 UNIT/GM-% Cream APPLY A THIN LAYER TO FUNGAL RASH ONCE DAILY AS NEEDED 30 g 0   • nystatin/triamcinolone (MYCOLOG) 780222-3.1 UNIT/GM-% Cream APPLY A THIN LAYER TO FUNGAL RASH ONCE DAILY AS NEEDED 30 g 0   • lactulose 10 GM/15ML Solution TAKE 30 MLS (6 TEASPOONFULS) BY MOUTH TWO TIMES DAILY 5400 mL 1   •  rosuvastatin (CRESTOR) 10 MG Tab TAKE 1 TAB BY MOUTH EVERY EVENING. 100 Tab 0   • TRELEGY ELLIPTA 100-62.5-25 MCG/INH AEROSOL POWDER, BREATH ACTIVATED INHALE 1 PUFF BY MOUTH DAILY 3 Each 0   • DULoxetine (CYMBALTA) 60 MG Cap DR Particles delayed-release capsule TAKE 1 CAPSULE BY MOUTH DAILY 90 Cap 0   • Misc. Devices Misc Portable oxygen simply go mini 4L oxygen supplementation. On demand. Fax to preferred home care 1 Each 0   • lisinopril (PRINIVIL) 5 MG Tab Take 1 Tab by mouth every day. 30 Tab 11   • cyclobenzaprine (FLEXERIL) 10 MG Tab Take 1 Tab by mouth every bedtime. 90 Tab 3   • Diclofenac Sodium 1 % Gel Apply 2 g to skin as directed 2 times a day as needed (for pain). 90 g 3   • ipratropium-albuterol (DUONEB) 0.5-2.5 (3) MG/3ML nebulizer solution 3 mL by Nebulization route every 6 hours as needed for Shortness of Breath. 30 Bullet 0   • insulin glargine (LANTUS) 100 UNIT/ML Solution Inject 20 Units as instructed every evening.     • albuterol 108 (90 Base) MCG/ACT Aero Soln inhalation aerosol Inhale 2 Puffs by mouth every four hours as needed for Shortness of Breath.  3   • levothyroxine (SYNTHROID) 75 MCG Tab TAKE 1 TABLET BY MOUTH DAILY 90 Tab 3   • amitriptyline (ELAVIL) 50 MG Tab Take 50 mg by mouth every bedtime.  3   • calcipotriene (DOVONEX) 0.005 % Cream APPLY 1-2 GRAMS TOPICALLY TO AFFECTED AREA 1-2 TIMES A DAY. DO NOT APPLY TO FACE  3     No current facility-administered medications for this visit.      She  has a past medical history of Arthritis, ASTHMA, Backpain, Breath shortness, Bronchitis, Congestive heart failure (HCC) (2013), COPD, Diabetes, Disorder of thyroid, Fall, Fibromyalgia, GERD (gastroesophageal reflux disease), Heart burn, Hepatitis C, Hypertension, Indigestion, Infectious disease, Neuropathy ([facility]), On home oxygen therapy, Other specified symptom associated with female genital organs, Pain (9/13/2016), PND (post-nasal drip), Pneumonia, Psychiatric problem (9/13/2016), RLS  "(restless legs syndrome), Sleep apnea, and Unspecified urinary incontinence.    ROS  No chest pain, no shortness of breath, no abdominal pain       Objective:     /82 (BP Location: Left arm, Patient Position: Sitting, BP Cuff Size: Adult)   Pulse 81   Temp 36.7 °C (98 °F)   Ht 1.651 m (5' 5\")   Wt 103 kg (227 lb)   SpO2 94%  Body mass index is 37.77 kg/m².   Physical Exam:  Constitutional: Alert, no distress. On continuous oxygen via NC, in a WC  Skin: Warm, dry, good turgor, no rashes in visible areas.  Eye: Equal, round and reactive, conjunctiva clear, lids normal.  ENMT: Lips without lesions, good dentition, oropharynx clear.  Neck: Trachea midline, no masses, no thyromegaly. No cervical or supraclavicular lymphadenopathy  Respiratory: Unlabored respiratory effort, lungs clear to auscultation, no wheezes, no ronchi.  Cardiovascular: Normal S1, S2, no murmur, no edema.  Abdomen: Soft, non-tender, no masses, no hepatosplenomegaly.  Psych: Alert and oriented x3, normal affect and mood.        Assessment and Plan:   The following treatment plan was discussed    1. Chronic pain syndrome  Follow up with pain management for medication refills of opioid pain medication.     2. Primary osteoarthritis involving multiple joints  No acute changes.     3. Lumbar spondylosis with myelopathy  No acute changes.     4. CKD (chronic kidney disease), stage IV (HCC)  improving    5. DM type 2, uncontrolled, with renal complications (HCC)  Controlled.   - Continuous Blood Gluc  (FREESTYLE EDMUNDO 14 DAY READER) Device; 1 Each by Does not apply route 1 time daily as needed.  Dispense: 1 Device; Refill: 0  - Continuous Blood Gluc Sensor (FREESTYLE EDMUNDO 14 DAY SENSOR) Misc; 1 Each by Does not apply route every 14 days.  Dispense: 2 Each; Refill: 11    6. Hypothyroidism, unspecified type  Stable.       Followup: No follow-ups on file.  Insurance is SCP so she will establish with another clinic for next visit.        "

## 2019-12-17 NOTE — ASSESSMENT & PLAN NOTE
A1c excellent at 6.8  Patient is taking her medication lantus 20 units qhs  Has CKD stage IV  Is on lisinopril 5 mg  LDL 60 on rosuvastatin 10 mg  Advised her to get her annual eye exam done.   
Going to follow up with her pain specialist Dr. Arteaga for ongoing refills of oxycodone 10 mg now decreased to 85 pill every 30 days. Encouraged to continue to wean down.   Severe pain in multiple joints due to arthritis.   
Normal TSH on levothyroxine 75 mcg  
Recent labs show improvement.   Continues follow up with her nephrologist.   Has anemia associated with CKD stage IV has low iron, had an iron infusion and improved H/H  
no

## 2020-01-03 RX ORDER — LEVOTHYROXINE SODIUM 0.07 MG/1
TABLET ORAL
Qty: 90 TAB | Refills: 1 | Status: ON HOLD
Start: 2020-01-03 | End: 2020-07-22

## 2020-02-19 RX ORDER — DULOXETIN HYDROCHLORIDE 60 MG/1
CAPSULE, DELAYED RELEASE ORAL
Qty: 90 CAP | Refills: 1 | Status: SHIPPED | OUTPATIENT
Start: 2020-02-19

## 2020-02-19 NOTE — TELEPHONE ENCOUNTER
Was the patient seen in the last year in this department? Yes    Does patient have an active prescription for medications requested? No     Received Request Via: Pharmacy    Pt met protocol?: Yes     Last OV 12/2019

## 2020-03-09 RX ORDER — LACTULOSE 10 G/15ML
SOLUTION ORAL
Qty: 5400 ML | Refills: 1 | Status: SHIPPED
Start: 2020-03-09 | End: 2020-06-15

## 2020-03-16 NOTE — TELEPHONE ENCOUNTER
Was the patient seen in the last year in this department? Yes    Does patient have an active prescription for medications requested? No     Received Request Via: Pharmacy      Pt met protocol?: Yes    *NEEDS TO VERIFY BRODERICK SCHAFFER 12/19Felix VALERIO

## 2020-03-17 RX ORDER — TRAZODONE HYDROCHLORIDE 150 MG/1
TABLET ORAL
Qty: 540 TAB | Refills: 1 | Status: SHIPPED
Start: 2020-03-17 | End: 2020-06-15

## 2020-03-31 RX ORDER — INSULIN GLARGINE 100 [IU]/ML
INJECTION, SOLUTION SUBCUTANEOUS
Qty: 15 ML | Refills: 0 | Status: SHIPPED
Start: 2020-03-31 | End: 2020-06-15

## 2020-03-31 NOTE — TELEPHONE ENCOUNTER
*HISTORICAL MEDICATION*  *PER NEW INS PROTOCOL, NEEDS TO BE DONE FOR 100DAYS SUPPLY*  *PT NEEDS TO ESTABLISH WITH A NEW PCP AND GET LABS UPDATED*  Was the patient seen in the last year in this department? Yes    Does patient have an active prescription for medications requested? No     Received Request Via: Pharmacy      Pt met protocol?: NO    LAST OV 12/17/2019    Lab Results   Component Value Date/Time    HBA1C 6.7 (H) 06/17/2019 0101     Lab Results   Component Value Date/Time    AVGLUC 146 06/17/2019 0101     Lab Results   Component Value Date/Time    CHOLSTRLTOT 116 08/21/2019 0941     Lab Results   Component Value Date/Time    TRIGLYCERIDE 100 08/21/2019 0941     No components found for: HLD  Lab Results   Component Value Date/Time    LDL 60 08/21/2019 0941

## 2020-04-21 RX ORDER — LISINOPRIL 5 MG/1
TABLET ORAL
Qty: 30 TAB | Refills: 11 | Status: ON HOLD
Start: 2020-04-21 | End: 2020-06-21

## 2020-05-05 RX ORDER — AMITRIPTYLINE HYDROCHLORIDE 50 MG/1
TABLET, FILM COATED ORAL
Qty: 90 TAB | Refills: 0 | Status: SHIPPED
Start: 2020-05-05 | End: 2020-06-15

## 2020-05-05 NOTE — TELEPHONE ENCOUNTER
*PT NEEDS TO ESTABLISH WITH A PCP*  Was the patient seen in the last year in this department? Yes    Does patient have an active prescription for medications requested? No     Received Request Via: Pharmacy      Pt met protocol?: NO      LAST OV 12/17/2019

## 2020-06-15 ENCOUNTER — APPOINTMENT (OUTPATIENT)
Dept: CARDIOLOGY | Facility: MEDICAL CENTER | Age: 70
DRG: 190 | End: 2020-06-15
Attending: INTERNAL MEDICINE
Payer: MEDICARE

## 2020-06-15 ENCOUNTER — HOSPITAL ENCOUNTER (INPATIENT)
Facility: MEDICAL CENTER | Age: 70
LOS: 7 days | DRG: 190 | End: 2020-06-22
Attending: EMERGENCY MEDICINE | Admitting: HOSPITALIST
Payer: MEDICARE

## 2020-06-15 ENCOUNTER — APPOINTMENT (OUTPATIENT)
Dept: RADIOLOGY | Facility: MEDICAL CENTER | Age: 70
DRG: 190 | End: 2020-06-15
Attending: EMERGENCY MEDICINE
Payer: MEDICARE

## 2020-06-15 DIAGNOSIS — N18.4 CKD (CHRONIC KIDNEY DISEASE), STAGE IV (HCC): ICD-10-CM

## 2020-06-15 DIAGNOSIS — I34.2 NON-RHEUMATIC MITRAL VALVE STENOSIS: ICD-10-CM

## 2020-06-15 DIAGNOSIS — E87.5 HYPERKALEMIA: ICD-10-CM

## 2020-06-15 DIAGNOSIS — J44.1 CHRONIC OBSTRUCTIVE PULMONARY DISEASE WITH ACUTE EXACERBATION (HCC): ICD-10-CM

## 2020-06-15 DIAGNOSIS — I50.20 ACC/AHA STAGE C SYSTOLIC HEART FAILURE (HCC): ICD-10-CM

## 2020-06-15 DIAGNOSIS — I50.32 CHRONIC HEART FAILURE WITH PRESERVED EJECTION FRACTION (HCC): ICD-10-CM

## 2020-06-15 DIAGNOSIS — R06.02 SHORTNESS OF BREATH: ICD-10-CM

## 2020-06-15 DIAGNOSIS — R79.89 ELEVATED TROPONIN: ICD-10-CM

## 2020-06-15 DIAGNOSIS — I34.2 NONRHEUMATIC MITRAL VALVE STENOSIS: ICD-10-CM

## 2020-06-15 PROBLEM — Z71.89 ACP (ADVANCE CARE PLANNING): Status: ACTIVE | Noted: 2019-06-12

## 2020-06-15 LAB
ALBUMIN SERPL BCP-MCNC: 3.4 G/DL (ref 3.2–4.9)
ALBUMIN/GLOB SERPL: 1.1 G/DL
ALP SERPL-CCNC: 59 U/L (ref 30–99)
ALT SERPL-CCNC: 19 U/L (ref 2–50)
ANION GAP SERPL CALC-SCNC: 11 MMOL/L (ref 7–16)
ANION GAP SERPL CALC-SCNC: 12 MMOL/L (ref 7–16)
ANION GAP SERPL CALC-SCNC: 14 MMOL/L (ref 7–16)
AST SERPL-CCNC: 13 U/L (ref 12–45)
BASOPHILS # BLD AUTO: 0.3 % (ref 0–1.8)
BASOPHILS # BLD: 0.03 K/UL (ref 0–0.12)
BILIRUB SERPL-MCNC: 0.2 MG/DL (ref 0.1–1.5)
BUN SERPL-MCNC: 40 MG/DL (ref 8–22)
BUN SERPL-MCNC: 45 MG/DL (ref 8–22)
BUN SERPL-MCNC: 47 MG/DL (ref 8–22)
CALCIUM SERPL-MCNC: 9.1 MG/DL (ref 8.5–10.5)
CALCIUM SERPL-MCNC: 9.5 MG/DL (ref 8.5–10.5)
CALCIUM SERPL-MCNC: 9.6 MG/DL (ref 8.5–10.5)
CHLORIDE SERPL-SCNC: 102 MMOL/L (ref 96–112)
CHLORIDE SERPL-SCNC: 97 MMOL/L (ref 96–112)
CHLORIDE SERPL-SCNC: 98 MMOL/L (ref 96–112)
CO2 SERPL-SCNC: 23 MMOL/L (ref 20–33)
CO2 SERPL-SCNC: 24 MMOL/L (ref 20–33)
CO2 SERPL-SCNC: 27 MMOL/L (ref 20–33)
COVID ORDER STATUS COVID19: NORMAL
CREAT SERPL-MCNC: 2.29 MG/DL (ref 0.5–1.4)
CREAT SERPL-MCNC: 2.39 MG/DL (ref 0.5–1.4)
CREAT SERPL-MCNC: 2.4 MG/DL (ref 0.5–1.4)
EKG IMPRESSION: NORMAL
EOSINOPHIL # BLD AUTO: 0.15 K/UL (ref 0–0.51)
EOSINOPHIL NFR BLD: 1.3 % (ref 0–6.9)
ERYTHROCYTE [DISTWIDTH] IN BLOOD BY AUTOMATED COUNT: 49.1 FL (ref 35.9–50)
GLOBULIN SER CALC-MCNC: 3 G/DL (ref 1.9–3.5)
GLUCOSE BLD-MCNC: 285 MG/DL (ref 65–99)
GLUCOSE BLD-MCNC: 315 MG/DL (ref 65–99)
GLUCOSE SERPL-MCNC: 158 MG/DL (ref 65–99)
GLUCOSE SERPL-MCNC: 323 MG/DL (ref 65–99)
GLUCOSE SERPL-MCNC: 337 MG/DL (ref 65–99)
HCT VFR BLD AUTO: 25.3 % (ref 37–47)
HGB BLD-MCNC: 7.6 G/DL (ref 12–16)
IMM GRANULOCYTES # BLD AUTO: 0.08 K/UL (ref 0–0.11)
IMM GRANULOCYTES NFR BLD AUTO: 0.7 % (ref 0–0.9)
LACTATE BLD-SCNC: 1.5 MMOL/L (ref 0.5–2)
LYMPHOCYTES # BLD AUTO: 1.21 K/UL (ref 1–4.8)
LYMPHOCYTES NFR BLD: 10.4 % (ref 22–41)
MCH RBC QN AUTO: 31.3 PG (ref 27–33)
MCHC RBC AUTO-ENTMCNC: 30 G/DL (ref 33.6–35)
MCV RBC AUTO: 104.1 FL (ref 81.4–97.8)
MONOCYTES # BLD AUTO: 0.65 K/UL (ref 0–0.85)
MONOCYTES NFR BLD AUTO: 5.6 % (ref 0–13.4)
NEUTROPHILS # BLD AUTO: 9.51 K/UL (ref 2–7.15)
NEUTROPHILS NFR BLD: 81.7 % (ref 44–72)
NRBC # BLD AUTO: 0 K/UL
NRBC BLD-RTO: 0 /100 WBC
NT-PROBNP SERPL IA-MCNC: 1387 PG/ML (ref 0–125)
PLATELET # BLD AUTO: 227 K/UL (ref 164–446)
PMV BLD AUTO: 9.1 FL (ref 9–12.9)
POTASSIUM SERPL-SCNC: 5.9 MMOL/L (ref 3.6–5.5)
POTASSIUM SERPL-SCNC: 6.6 MMOL/L (ref 3.6–5.5)
POTASSIUM SERPL-SCNC: 6.7 MMOL/L (ref 3.6–5.5)
PROT SERPL-MCNC: 6.4 G/DL (ref 6–8.2)
RBC # BLD AUTO: 2.43 M/UL (ref 4.2–5.4)
SARS-COV-2 RNA RESP QL NAA+PROBE: NOTDETECTED
SODIUM SERPL-SCNC: 135 MMOL/L (ref 135–145)
SODIUM SERPL-SCNC: 135 MMOL/L (ref 135–145)
SODIUM SERPL-SCNC: 138 MMOL/L (ref 135–145)
SPECIMEN SOURCE: NORMAL
TROPONIN T SERPL-MCNC: 100 NG/L (ref 6–19)
WBC # BLD AUTO: 11.6 K/UL (ref 4.8–10.8)

## 2020-06-15 PROCEDURE — 770020 HCHG ROOM/CARE - TELE (206)

## 2020-06-15 PROCEDURE — 700102 HCHG RX REV CODE 250 W/ 637 OVERRIDE(OP): Performed by: HOSPITALIST

## 2020-06-15 PROCEDURE — 99223 1ST HOSP IP/OBS HIGH 75: CPT | Mod: AI,25 | Performed by: HOSPITALIST

## 2020-06-15 PROCEDURE — 82962 GLUCOSE BLOOD TEST: CPT

## 2020-06-15 PROCEDURE — 93306 TTE W/DOPPLER COMPLETE: CPT

## 2020-06-15 PROCEDURE — 700101 HCHG RX REV CODE 250: Performed by: HOSPITALIST

## 2020-06-15 PROCEDURE — 94760 N-INVAS EAR/PLS OXIMETRY 1: CPT

## 2020-06-15 PROCEDURE — 85025 COMPLETE CBC W/AUTO DIFF WBC: CPT

## 2020-06-15 PROCEDURE — 93005 ELECTROCARDIOGRAM TRACING: CPT | Performed by: EMERGENCY MEDICINE

## 2020-06-15 PROCEDURE — A9270 NON-COVERED ITEM OR SERVICE: HCPCS | Performed by: HOSPITALIST

## 2020-06-15 PROCEDURE — C9803 HOPD COVID-19 SPEC COLLECT: HCPCS | Performed by: EMERGENCY MEDICINE

## 2020-06-15 PROCEDURE — 700102 HCHG RX REV CODE 250 W/ 637 OVERRIDE(OP): Performed by: EMERGENCY MEDICINE

## 2020-06-15 PROCEDURE — 99285 EMERGENCY DEPT VISIT HI MDM: CPT

## 2020-06-15 PROCEDURE — 96374 THER/PROPH/DIAG INJ IV PUSH: CPT

## 2020-06-15 PROCEDURE — 700111 HCHG RX REV CODE 636 W/ 250 OVERRIDE (IP): Performed by: EMERGENCY MEDICINE

## 2020-06-15 PROCEDURE — 84484 ASSAY OF TROPONIN QUANT: CPT

## 2020-06-15 PROCEDURE — 700105 HCHG RX REV CODE 258

## 2020-06-15 PROCEDURE — 71045 X-RAY EXAM CHEST 1 VIEW: CPT

## 2020-06-15 PROCEDURE — 80048 BASIC METABOLIC PNL TOTAL CA: CPT

## 2020-06-15 PROCEDURE — 94640 AIRWAY INHALATION TREATMENT: CPT

## 2020-06-15 PROCEDURE — 700101 HCHG RX REV CODE 250: Performed by: EMERGENCY MEDICINE

## 2020-06-15 PROCEDURE — 99222 1ST HOSP IP/OBS MODERATE 55: CPT | Performed by: INTERNAL MEDICINE

## 2020-06-15 PROCEDURE — 80053 COMPREHEN METABOLIC PANEL: CPT

## 2020-06-15 PROCEDURE — 700111 HCHG RX REV CODE 636 W/ 250 OVERRIDE (IP): Performed by: HOSPITALIST

## 2020-06-15 PROCEDURE — 96375 TX/PRO/DX INJ NEW DRUG ADDON: CPT

## 2020-06-15 PROCEDURE — 36415 COLL VENOUS BLD VENIPUNCTURE: CPT

## 2020-06-15 PROCEDURE — 99406 BEHAV CHNG SMOKING 3-10 MIN: CPT | Performed by: HOSPITALIST

## 2020-06-15 PROCEDURE — 83605 ASSAY OF LACTIC ACID: CPT

## 2020-06-15 PROCEDURE — 99497 ADVNCD CARE PLAN 30 MIN: CPT | Performed by: HOSPITALIST

## 2020-06-15 PROCEDURE — 94669 MECHANICAL CHEST WALL OSCILL: CPT

## 2020-06-15 PROCEDURE — 87040 BLOOD CULTURE FOR BACTERIA: CPT | Mod: 91

## 2020-06-15 PROCEDURE — U0003 INFECTIOUS AGENT DETECTION BY NUCLEIC ACID (DNA OR RNA); SEVERE ACUTE RESPIRATORY SYNDROME CORONAVIRUS 2 (SARS-COV-2) (CORONAVIRUS DISEASE [COVID-19]), AMPLIFIED PROBE TECHNIQUE, MAKING USE OF HIGH THROUGHPUT TECHNOLOGIES AS DESCRIBED BY CMS-2020-01-R: HCPCS

## 2020-06-15 PROCEDURE — 83880 ASSAY OF NATRIURETIC PEPTIDE: CPT

## 2020-06-15 PROCEDURE — 700105 HCHG RX REV CODE 258: Performed by: HOSPITALIST

## 2020-06-15 PROCEDURE — U0004 COV-19 TEST NON-CDC HGH THRU: HCPCS

## 2020-06-15 PROCEDURE — A9270 NON-COVERED ITEM OR SERVICE: HCPCS | Performed by: EMERGENCY MEDICINE

## 2020-06-15 RX ORDER — SODIUM CHLORIDE 9 MG/ML
1000 INJECTION, SOLUTION INTRAVENOUS ONCE
Status: DISCONTINUED | OUTPATIENT
Start: 2020-06-15 | End: 2020-06-15

## 2020-06-15 RX ORDER — OXYCODONE HYDROCHLORIDE 10 MG/1
10 TABLET ORAL 3 TIMES DAILY PRN
Status: DISCONTINUED | OUTPATIENT
Start: 2020-06-15 | End: 2020-06-22 | Stop reason: HOSPADM

## 2020-06-15 RX ORDER — ALBUTEROL SULFATE 90 UG/1
2 AEROSOL, METERED RESPIRATORY (INHALATION) EVERY 4 HOURS PRN
Status: DISCONTINUED | OUTPATIENT
Start: 2020-06-15 | End: 2020-06-22 | Stop reason: HOSPADM

## 2020-06-15 RX ORDER — FUROSEMIDE 10 MG/ML
20 INJECTION INTRAMUSCULAR; INTRAVENOUS
Status: DISCONTINUED | OUTPATIENT
Start: 2020-06-15 | End: 2020-06-16

## 2020-06-15 RX ORDER — NICOTINE 21 MG/24HR
14 PATCH, TRANSDERMAL 24 HOURS TRANSDERMAL
Status: DISCONTINUED | OUTPATIENT
Start: 2020-06-15 | End: 2020-06-22 | Stop reason: HOSPADM

## 2020-06-15 RX ORDER — GUAIFENESIN/DEXTROMETHORPHAN 100-10MG/5
10 SYRUP ORAL EVERY 6 HOURS PRN
Status: DISCONTINUED | OUTPATIENT
Start: 2020-06-15 | End: 2020-06-22 | Stop reason: HOSPADM

## 2020-06-15 RX ORDER — DEXTROSE MONOHYDRATE 25 G/50ML
25 INJECTION, SOLUTION INTRAVENOUS ONCE
Status: COMPLETED | OUTPATIENT
Start: 2020-06-15 | End: 2020-06-15

## 2020-06-15 RX ORDER — AMITRIPTYLINE HYDROCHLORIDE 50 MG/1
50 TABLET, FILM COATED ORAL NIGHTLY
COMMUNITY

## 2020-06-15 RX ORDER — IPRATROPIUM BROMIDE AND ALBUTEROL SULFATE 2.5; .5 MG/3ML; MG/3ML
3 SOLUTION RESPIRATORY (INHALATION)
Status: DISCONTINUED | OUTPATIENT
Start: 2020-06-15 | End: 2020-06-17

## 2020-06-15 RX ORDER — LACTULOSE 10 G/15ML
20 SOLUTION ORAL 2 TIMES DAILY PRN
COMMUNITY
End: 2020-11-29

## 2020-06-15 RX ORDER — DEXTROSE MONOHYDRATE 25 G/50ML
50 INJECTION, SOLUTION INTRAVENOUS
Status: DISCONTINUED | OUTPATIENT
Start: 2020-06-15 | End: 2020-06-21

## 2020-06-15 RX ORDER — POLYETHYLENE GLYCOL 3350 17 G/17G
1 POWDER, FOR SOLUTION ORAL
Status: DISCONTINUED | OUTPATIENT
Start: 2020-06-15 | End: 2020-06-22 | Stop reason: HOSPADM

## 2020-06-15 RX ORDER — TRAZODONE HYDROCHLORIDE 150 MG/1
300 TABLET ORAL EVERY EVENING
COMMUNITY

## 2020-06-15 RX ORDER — HYDROMORPHONE HYDROCHLORIDE 1 MG/ML
0.25 INJECTION, SOLUTION INTRAMUSCULAR; INTRAVENOUS; SUBCUTANEOUS
Status: DISCONTINUED | OUTPATIENT
Start: 2020-06-15 | End: 2020-06-22 | Stop reason: HOSPADM

## 2020-06-15 RX ORDER — DEXTROSE MONOHYDRATE 25 G/50ML
50 INJECTION, SOLUTION INTRAVENOUS ONCE
Status: COMPLETED | OUTPATIENT
Start: 2020-06-15 | End: 2020-06-15

## 2020-06-15 RX ORDER — LIDOCAINE 50 MG/G
1 OINTMENT TOPICAL PRN
COMMUNITY

## 2020-06-15 RX ORDER — ROSUVASTATIN CALCIUM 20 MG/1
10 TABLET, COATED ORAL EVERY EVENING
Status: DISCONTINUED | OUTPATIENT
Start: 2020-06-15 | End: 2020-06-22 | Stop reason: HOSPADM

## 2020-06-15 RX ORDER — INSULIN GLARGINE 100 [IU]/ML
20 INJECTION, SOLUTION SUBCUTANEOUS NIGHTLY
Status: DISCONTINUED | OUTPATIENT
Start: 2020-06-15 | End: 2020-06-22 | Stop reason: HOSPADM

## 2020-06-15 RX ORDER — LEVOTHYROXINE SODIUM 0.07 MG/1
75 TABLET ORAL
Status: DISCONTINUED | OUTPATIENT
Start: 2020-06-16 | End: 2020-06-22 | Stop reason: HOSPADM

## 2020-06-15 RX ORDER — OXYCODONE HYDROCHLORIDE 10 MG/1
10 TABLET ORAL 3 TIMES DAILY PRN
COMMUNITY
End: 2020-11-29

## 2020-06-15 RX ORDER — CALCIUM CHLORIDE 100 MG/ML
1 INJECTION INTRAVENOUS; INTRAVENTRICULAR ONCE
Status: COMPLETED | OUTPATIENT
Start: 2020-06-15 | End: 2020-06-15

## 2020-06-15 RX ORDER — PREDNISONE 20 MG/1
40 TABLET ORAL DAILY
Status: COMPLETED | OUTPATIENT
Start: 2020-06-16 | End: 2020-06-20

## 2020-06-15 RX ORDER — ASPIRIN 81 MG/1
324 TABLET, CHEWABLE ORAL ONCE
Status: COMPLETED | OUTPATIENT
Start: 2020-06-15 | End: 2020-06-15

## 2020-06-15 RX ORDER — ACETAMINOPHEN 325 MG/1
650 TABLET ORAL EVERY 6 HOURS PRN
Status: DISCONTINUED | OUTPATIENT
Start: 2020-06-15 | End: 2020-06-22 | Stop reason: HOSPADM

## 2020-06-15 RX ORDER — TRAZODONE HYDROCHLORIDE 50 MG/1
50 TABLET ORAL 2 TIMES DAILY
Status: DISCONTINUED | OUTPATIENT
Start: 2020-06-15 | End: 2020-06-22 | Stop reason: HOSPADM

## 2020-06-15 RX ORDER — GUAIFENESIN 600 MG/1
1200 TABLET, EXTENDED RELEASE ORAL 2 TIMES DAILY
Status: DISCONTINUED | OUTPATIENT
Start: 2020-06-15 | End: 2020-06-22 | Stop reason: HOSPADM

## 2020-06-15 RX ORDER — AMITRIPTYLINE HYDROCHLORIDE 50 MG/1
100 TABLET, FILM COATED ORAL NIGHTLY
Status: DISCONTINUED | OUTPATIENT
Start: 2020-06-15 | End: 2020-06-22 | Stop reason: HOSPADM

## 2020-06-15 RX ORDER — BISACODYL 10 MG
10 SUPPOSITORY, RECTAL RECTAL
Status: DISCONTINUED | OUTPATIENT
Start: 2020-06-15 | End: 2020-06-22 | Stop reason: HOSPADM

## 2020-06-15 RX ORDER — AMOXICILLIN 250 MG
2 CAPSULE ORAL 2 TIMES DAILY
Status: DISCONTINUED | OUTPATIENT
Start: 2020-06-15 | End: 2020-06-22 | Stop reason: HOSPADM

## 2020-06-15 RX ORDER — SODIUM CHLORIDE 9 MG/ML
INJECTION, SOLUTION INTRAVENOUS
Status: COMPLETED
Start: 2020-06-15 | End: 2020-06-15

## 2020-06-15 RX ADMIN — INSULIN HUMAN 10 UNITS: 100 INJECTION, SOLUTION PARENTERAL at 14:29

## 2020-06-15 RX ADMIN — IPRATROPIUM BROMIDE AND ALBUTEROL SULFATE 3 ML: .5; 3 SOLUTION RESPIRATORY (INHALATION) at 22:48

## 2020-06-15 RX ADMIN — CALCIUM GLUCONATE 2000 MG: 98 INJECTION, SOLUTION INTRAVENOUS at 21:23

## 2020-06-15 RX ADMIN — SODIUM CHLORIDE: 9 INJECTION, SOLUTION INTRAVENOUS at 21:00

## 2020-06-15 RX ADMIN — AMITRIPTYLINE HYDROCHLORIDE 100 MG: 50 TABLET, FILM COATED ORAL at 20:20

## 2020-06-15 RX ADMIN — NICOTINE 14 MG: 14 PATCH TRANSDERMAL at 17:14

## 2020-06-15 RX ADMIN — ASPIRIN 81 MG 324 MG: 81 TABLET ORAL at 14:24

## 2020-06-15 RX ADMIN — INSULIN GLARGINE 20 UNITS: 100 INJECTION, SOLUTION SUBCUTANEOUS at 20:15

## 2020-06-15 RX ADMIN — IPRATROPIUM BROMIDE AND ALBUTEROL SULFATE 3 ML: .5; 3 SOLUTION RESPIRATORY (INHALATION) at 19:04

## 2020-06-15 RX ADMIN — CALCIUM CHLORIDE 1 G: 100 INJECTION INTRAVENOUS; INTRAVENTRICULAR at 14:26

## 2020-06-15 RX ADMIN — INSULIN HUMAN 5 UNITS: 100 INJECTION, SOLUTION PARENTERAL at 17:45

## 2020-06-15 RX ADMIN — SODIUM BICARBONATE 50 MEQ: 84 INJECTION INTRAVENOUS at 14:32

## 2020-06-15 RX ADMIN — INSULIN HUMAN 6 UNITS: 100 INJECTION, SOLUTION PARENTERAL at 20:15

## 2020-06-15 RX ADMIN — DOCUSATE SODIUM 50 MG AND SENNOSIDES 8.6 MG 2 TABLET: 8.6; 5 TABLET, FILM COATED ORAL at 17:13

## 2020-06-15 RX ADMIN — GUAIFENESIN 1200 MG: 600 TABLET, EXTENDED RELEASE ORAL at 20:20

## 2020-06-15 RX ADMIN — SODIUM BICARBONATE 50 MEQ: 84 INJECTION INTRAVENOUS at 22:43

## 2020-06-15 RX ADMIN — DEXTROSE MONOHYDRATE 50 ML: 25 INJECTION, SOLUTION INTRAVENOUS at 14:00

## 2020-06-15 RX ADMIN — INSULIN HUMAN 10 UNITS: 100 INJECTION, SOLUTION PARENTERAL at 21:21

## 2020-06-15 RX ADMIN — DEXTROSE MONOHYDRATE 25 ML: 25 INJECTION, SOLUTION INTRAVENOUS at 21:21

## 2020-06-15 RX ADMIN — TRAZODONE HYDROCHLORIDE 50 MG: 50 TABLET ORAL at 17:14

## 2020-06-15 ASSESSMENT — ENCOUNTER SYMPTOMS
FEVER: 0
WHEEZING: 1
SEIZURES: 0
DIARRHEA: 0
FOCAL WEAKNESS: 0
HEMOPTYSIS: 0
POLYDIPSIA: 0
FALLS: 0
SHORTNESS OF BREATH: 1
PALPITATIONS: 0
ABDOMINAL PAIN: 0
EYE PAIN: 0
EYE DISCHARGE: 0
FLANK PAIN: 0
SPEECH CHANGE: 0
STRIDOR: 0
LOSS OF CONSCIOUSNESS: 0
SORE THROAT: 0
COUGH: 1
NERVOUS/ANXIOUS: 0
VOMITING: 0
BLOOD IN STOOL: 0
PHOTOPHOBIA: 0
BRUISES/BLEEDS EASILY: 0
MYALGIAS: 0
SPUTUM PRODUCTION: 1
CHILLS: 0
HALLUCINATIONS: 0

## 2020-06-15 ASSESSMENT — LIFESTYLE VARIABLES
EVER_SMOKED: YES
TOTAL SCORE: 0
HAVE YOU EVER FELT YOU SHOULD CUT DOWN ON YOUR DRINKING: NO
EVER HAD A DRINK FIRST THING IN THE MORNING TO STEADY YOUR NERVES TO GET RID OF A HANGOVER: NO
HAVE PEOPLE ANNOYED YOU BY CRITICIZING YOUR DRINKING: NO
HOW MANY TIMES IN THE PAST YEAR HAVE YOU HAD 5 OR MORE DRINKS IN A DAY: 0
TOTAL SCORE: 0
AVERAGE NUMBER OF DAYS PER WEEK YOU HAVE A DRINK CONTAINING ALCOHOL: 0
ON A TYPICAL DAY WHEN YOU DRINK ALCOHOL HOW MANY DRINKS DO YOU HAVE: 0
ALCOHOL_USE: NO
TOTAL SCORE: 0
CONSUMPTION TOTAL: NEGATIVE
DOES PATIENT WANT TO STOP DRINKING: NO
EVER FELT BAD OR GUILTY ABOUT YOUR DRINKING: NO

## 2020-06-15 ASSESSMENT — PATIENT HEALTH QUESTIONNAIRE - PHQ9
2. FEELING DOWN, DEPRESSED, IRRITABLE, OR HOPELESS: NOT AT ALL
1. LITTLE INTEREST OR PLEASURE IN DOING THINGS: NOT AT ALL
SUM OF ALL RESPONSES TO PHQ9 QUESTIONS 1 AND 2: 0

## 2020-06-15 ASSESSMENT — COGNITIVE AND FUNCTIONAL STATUS - GENERAL
MOBILITY SCORE: 18
DRESSING REGULAR UPPER BODY CLOTHING: A LITTLE
TOILETING: A LITTLE
SUGGESTED CMS G CODE MODIFIER DAILY ACTIVITY: CK
STANDING UP FROM CHAIR USING ARMS: A LITTLE
DAILY ACTIVITIY SCORE: 18
WALKING IN HOSPITAL ROOM: A LITTLE
CLIMB 3 TO 5 STEPS WITH RAILING: A LITTLE
SUGGESTED CMS G CODE MODIFIER MOBILITY: CK
TURNING FROM BACK TO SIDE WHILE IN FLAT BAD: A LITTLE
HELP NEEDED FOR BATHING: A LITTLE
DRESSING REGULAR LOWER BODY CLOTHING: A LITTLE
PERSONAL GROOMING: A LITTLE
EATING MEALS: A LITTLE
MOVING TO AND FROM BED TO CHAIR: A LITTLE
MOVING FROM LYING ON BACK TO SITTING ON SIDE OF FLAT BED: A LITTLE

## 2020-06-15 ASSESSMENT — FIBROSIS 4 INDEX
FIB4 SCORE: 0.92
FIB4 SCORE: 0.99

## 2020-06-15 NOTE — ED TRIAGE NOTES
Generalized CP/SOB started after smoking a cigarette at 1030 today.  EMS gave Solumedrol 125, Albuterol x 4, duoneb x 2. Pain free on arrival but still sob.

## 2020-06-15 NOTE — PROGRESS NOTES
68yo fem with hx of COPD presenting with dyspnea cough CP trop 100, hx of CAD with HAN K 5.9  covid 19 pending  ERP consulting cardiology  Dr Chavez will evaulate patient for admission.

## 2020-06-15 NOTE — ED PROVIDER NOTES
ED Provider    Scribed for Charan Farr D.O. by Roberto Carlos Cortez. 6/15/2020  12:26 PM    Means of arrival: EMS  History obtained from: Patient  History limited by: None    CHIEF COMPLAINT  Chief Complaint   Patient presents with   • Shortness of Breath       HPI  Priya Callahan is a 69 y.o. female with a history of COPD who presents for evaluation of shortness of breath. She states that she has been intermittently short of breath for the last week with associated chest pain, coughing, and has had a few episodes of hemoptysis when her coughing is more severe. Her shortness of breath is exacerbated with physical exertion, and causes her to feel lightheaded, however she has not lost consciousness because of this. Otherwise the patient reports that her legs have been more swollen over the last week, she has been nauseous with multiple episodes of vomiting, and is also concerned that she may have a UTI as her urine is noticeably foul smelling. Earlier today her shortness of breath and chest pain both exacerbated after smoking a cigarette and thus EMS was called. EMS administered 125 mg of solumedrol, four albuterol treatments, and two duoneb treatments. She arrives with no chest pain, however still feels short of breath.    REVIEW OF SYSTEMS  See HPI for further details. All other systems are negative.     PAST MEDICAL HISTORY   has a past medical history of Arthritis, ASTHMA, Backpain, Breath shortness, Bronchitis, Congestive heart failure (HCC) (2013), COPD, Diabetes, Disorder of thyroid, Fall, Fibromyalgia, GERD (gastroesophageal reflux disease), Heart burn, Hepatitis C, Hypertension, Indigestion, Infectious disease, Neuropathy (HCC), On home oxygen therapy, Other specified symptom associated with female genital organs, Pain (9/13/2016), PND (post-nasal drip), Pneumonia, Psychiatric problem (9/13/2016), RLS (restless legs syndrome), Sleep apnea, and Unspecified urinary incontinence.    SOCIAL  HISTORY  Social History     Tobacco Use   • Smoking status: Current Every Day Smoker     Packs/day: 0.25     Years: 50.00     Pack years: 12.50     Types: Cigarettes     Last attempt to quit: 3/20/2019     Years since quittin.2   • Smokeless tobacco: Never Used   Substance and Sexual Activity   • Alcohol use: No     Alcohol/week: 0.0 oz   • Drug use: No   • Sexual activity: Never       SURGICAL HISTORY   has a past surgical history that includes gyn surgery; other orthopedic surgery; other orthopedic surgery; us-needle core bx-breast panel; lumpectomy (Left); inj dx/ther agnt paravert facet joint, bruce* (Left, 7/10/2015); inj dx/ther agnt paravert facet joint, bruce* (7/10/2015); inj dx/ther agnt paravert facet joint, bruce* (7/10/2015); inj(s) nerve block other periphal (7/10/2015); inj dx/ther agnt paravert facet joint, bruce* (Left, 2015); inj dx/ther agnt paravert facet joint, bruce* (2015); inj dx/ther agnt paravert facet joint, bruce* (2015); inj(s) nerve block other periphal (2015); dstr nrolytc agnt parverteb fct sngl lmbr/sacral (Left, 10/2/2015); dstr nrolytc agnt parverteb fct addl lmbr/sacral (10/2/2015); inject rx other periph nerve (10/2/2015); dstr nrolytc agnt parverteb fct addl lmbr/sacral (10/2/2015); dstr nrolytc agnt parverteb fct sngl lmbr/sacral (Right, 2015); dstr nrolytc agnt parverteb fct addl lmbr/sacral (2015); inject rx other periph nerve (2015); dstr nrolytc agnt parverteb fct addl lmbr/sacral (2015); dstr nrolytc agnt parverteb fct sngl lmbr/sacral (Left, 2016); dstr nrolytc agnt parverteb fct addl lmbr/sacral (2016); dstr nrolytc agnt parverteb fct addl lmbr/sacral (2016); fluoroscopic guidance needle placement (2016); and ankle orif (Left, 2017).    CURRENT MEDICATIONS  No current facility-administered medications on file prior to encounter.      Current Outpatient Medications on File Prior to Encounter   Medication Sig Dispense  Refill   • nystatin/triamcinolone (MYCOLOG) 927493-0.1 UNIT/GM-% Cream APPLY A THIN LAYER TO FUNGAL RASH ONCE DAILY AS NEEDED 30 g 0   • amitriptyline (ELAVIL) 50 MG Tab TAKE ONE TABLET BY MOUTH NIGHTLY AT BEDTIME AS NEEDED FOR SLEEP 90 Tab 0   • lisinopril (PRINIVIL) 5 MG Tab TAKE 1 TABLET BY MOUTH DAILY 30 Tab 11   • LANTUS SOLOSTAR 100 UNIT/ML Solution Pen-injector injection INJECT 5-20 UNITS SUBCUTANEOUSLY EVERY EVENING 15 mL 0   • traZODone (DESYREL) 150 MG Tab TAKE 2 TABLETS BY MOUTH 3 TIMES A  Tab 1   • lactulose 10 GM/15ML Solution TAKE 30 MLS (6 TEASPOONFULS) BY MOUTH TWO TIMES DAILY 5400 mL 1   • DULoxetine (CYMBALTA) 60 MG Cap DR Particles delayed-release capsule TAKE 1 CAPSULE BY MOUTH DAILY 90 Cap 1   • rosuvastatin (CRESTOR) 10 MG Tab TAKE 1 TABLET BY MOUTH EVERY EVENING 100 Tab 3   • levothyroxine (SYNTHROID) 75 MCG Tab TAKE 1 TABLET BY MOUTH DAILY 90 Tab 1   • calcipotriene (DOVONEX) 0.005 % Cream APPLY 1-2 GRAMS TOPICALLY TO AFFECTED AREA 1-2 TIMES A DAY. DO NOT APPLY TO FACE  3   • hydrOXYzine HCl (ATARAX) 25 MG Tab Take 1 Tab by mouth 3 times a day as needed for Itching. 30 Tab 5   • Continuous Blood Gluc  (FREESTYLE EDMUNDO 14 DAY READER) Device 1 Each by Does not apply route 1 time daily as needed. 1 Device 0   • Continuous Blood Gluc Sensor (FREESTYLE EDMUNDO 14 DAY SENSOR) Misc 1 Each by Does not apply route every 14 days. 2 Each 11   • CHANTIX 1 MG tablet TAKE 1 TABLET BY MOUTH 2 TIMES A DAY. 60 Tab 1   • TRELEGY ELLIPTA 100-62.5-25 MCG/INH AEROSOL POWDER, BREATH ACTIVATED INHALE 1 PUFF BY MOUTH DAILY 3 Each 0   • Misc. Devices Misc Portable oxygen simply go mini 4L oxygen supplementation. On demand. Fax to preferred home care 1 Each 0   • cyclobenzaprine (FLEXERIL) 10 MG Tab Take 1 Tab by mouth every bedtime. 90 Tab 3   • Diclofenac Sodium 1 % Gel Apply 2 g to skin as directed 2 times a day as needed (for pain). 90 g 3   • ipratropium-albuterol (DUONEB) 0.5-2.5 (3) MG/3ML  "nebulizer solution 3 mL by Nebulization route every 6 hours as needed for Shortness of Breath. 30 Bullet 0   • insulin glargine (LANTUS) 100 UNIT/ML Solution Inject 20 Units as instructed every evening.     • albuterol 108 (90 Base) MCG/ACT Aero Soln inhalation aerosol Inhale 2 Puffs by mouth every four hours as needed for Shortness of Breath.  3       ALLERGIES  Allergies   Allergen Reactions   • Doxycycline Itching   • Vitamin C Diarrhea     \"violent diarrhea\"   • Codeine Nausea     Severe stomach pain   • Gabapentin Palpitations     Gets shaky and falls    • Keflex Rash     Severe rash, but TOLERATES PENICILLINS, Rocephin    • Lyrica Nausea     Nausea, dizzy   • Powders    • Sudafed Nausea and Unspecified     Chest pain, nausea   • Citrus        PHYSICAL EXAM  VITAL SIGNS: /59   Pulse 80   Temp 36.8 °C (98.2 °F)   Resp (!) 22   Ht 1.651 m (5' 5\")   Wt 99.8 kg (220 lb)   SpO2 97%   BMI 36.61 kg/m²   Constitutional: Alert  HENT: No signs of trauma, mucous membranes are moist  Eyes: Conjunctiva normal, Non-icteric.   Neck: Normal range of motion, No tenderness, Supple.  Lymphatic: No lymphadenopathy noted.   Cardiovascular: Regular rate and rhythm, no murmurs.   Thorax & Lungs: Diffuse wheezing and rales, No chest tenderness.   Abdomen: Bowel sounds normal, Soft, No tenderness, No masses, No pulsatile masses. No peritoneal signs.  Skin: Warm, Dry, normal color.   Back: No bony tenderness, No CVA tenderness.   Extremities: No edema, No tenderness, No cyanosis  Musculoskeletal: Good range of motion in all major joints. No tenderness to palpation or major deformities noted.   Neurologic: Alert and oriented x4, Normal motor function, Normal sensory function, No focal deficits noted.   Psychiatric: Affect normal, Judgment normal, Mood normal.     DIAGNOSTIC STUDIES / PROCEDURES    EKG  Interpreted by me  Rhythm:  Normal sinus rhythm   Rate: 83  Axis: normal  QRS: Normal  ST Segments: no acute change  T " Waves: no acute change  Compared to previous EKG from 6/15/19: Bradycardia is no longer present    LABS  Results for orders placed or performed during the hospital encounter of 06/15/20   CBC with Differential   Result Value Ref Range    WBC 11.6 (H) 4.8 - 10.8 K/uL    RBC 2.43 (L) 4.20 - 5.40 M/uL    Hemoglobin 7.6 (L) 12.0 - 16.0 g/dL    Hematocrit 25.3 (L) 37.0 - 47.0 %    .1 (H) 81.4 - 97.8 fL    MCH 31.3 27.0 - 33.0 pg    MCHC 30.0 (L) 33.6 - 35.0 g/dL    RDW 49.1 35.9 - 50.0 fL    Platelet Count 227 164 - 446 K/uL    MPV 9.1 9.0 - 12.9 fL    Neutrophils-Polys 81.70 (H) 44.00 - 72.00 %    Lymphocytes 10.40 (L) 22.00 - 41.00 %    Monocytes 5.60 0.00 - 13.40 %    Eosinophils 1.30 0.00 - 6.90 %    Basophils 0.30 0.00 - 1.80 %    Immature Granulocytes 0.70 0.00 - 0.90 %    Nucleated RBC 0.00 /100 WBC    Neutrophils (Absolute) 9.51 (H) 2.00 - 7.15 K/uL    Lymphs (Absolute) 1.21 1.00 - 4.80 K/uL    Monos (Absolute) 0.65 0.00 - 0.85 K/uL    Eos (Absolute) 0.15 0.00 - 0.51 K/uL    Baso (Absolute) 0.03 0.00 - 0.12 K/uL    Immature Granulocytes (abs) 0.08 0.00 - 0.11 K/uL    NRBC (Absolute) 0.00 K/uL   Complete Metabolic Panel (CMP)   Result Value Ref Range    Sodium 138 135 - 145 mmol/L    Potassium 5.9 (H) 3.6 - 5.5 mmol/L    Chloride 102 96 - 112 mmol/L    Co2 24 20 - 33 mmol/L    Anion Gap 12.0 7.0 - 16.0    Glucose 158 (H) 65 - 99 mg/dL    Bun 40 (H) 8 - 22 mg/dL    Creatinine 2.29 (H) 0.50 - 1.40 mg/dL    Calcium 9.1 8.5 - 10.5 mg/dL    AST(SGOT) 13 12 - 45 U/L    ALT(SGPT) 19 2 - 50 U/L    Alkaline Phosphatase 59 30 - 99 U/L    Total Bilirubin 0.2 0.1 - 1.5 mg/dL    Albumin 3.4 3.2 - 4.9 g/dL    Total Protein 6.4 6.0 - 8.2 g/dL    Globulin 3.0 1.9 - 3.5 g/dL    A-G Ratio 1.1 g/dL   Troponin   Result Value Ref Range    Troponin T 100 (H) 6 - 19 ng/L   ESTIMATED GFR   Result Value Ref Range    GFR If African American 25 (A) >60 mL/min/1.73 m 2    GFR If Non African American 21 (A) >60 mL/min/1.73 m 2   EKG    Result Value Ref Range    Report       [facility] Emergency Dept.    Test Date:  2020-06-15  Pt Name:    CHAITANYA CABELLO            Department: ER  MRN:        [id number]                      Room:       GR 38  Gender:     Female                       Technician: [id number]  :        [id number]                   Requested By:ER TRIAGE PROTOCOL  Order #:    [id number]                    Reading MD:    Measurements  Intervals                                Axis  Rate:       78                           P:  WA:                                      QRS:        -1  QRSD:       90                           T:          94  QT:         368  QTc:        420    Interpretive Statements  ACCELERATED JUNCTIONAL RHYTHM  BORDERLINE R WAVE PROGRESSION, ANTERIOR LEADS  BORDERLINE REPOLARIZATION ABNORMALITY  Compared to ECG 2019 17:39:19  Accelerated junctional rhythm now present  Sinus rhythm no longer present       All labs reviewed by me.    RADIOLOGY  DX-CHEST-PORTABLE (1 VIEW)   Final Result         Diffuse interstitial prominence could relate to mild pulmonary edema.      Stable cardiomegaly.        The radiologist's interpretations of all radiological studies have been reviewed by me.    Films have been independently by me      COURSE  Pertinent Labs & Imaging studies reviewed. (See chart for details)    12:26 PM - Patient seen and examined at bedside. Discussed plan of care including blood work, imaging, EKG and COVID testing. Ordered for DX-Chest 1 view, Lactic acid, Blood culture, COVID/Sars Cov 2, CBC with differential, CMP, Troponin, EKG to evaluate her symptoms.     1:21 PM - Paged Hospitalist    1:26 PM - Patient treated with  mg    1:31 PM - I spoke with Dr. Gayle Islas, Hospitalist, who requests a cardiology consult, treatment for the patients hyperkalemia, and informed me that Dr. Chavez, Hospitalist will evaluate the patient for hospitalization    1:37 PM - Patient will be treated with  Sodium Bicarbonate 8.4% IV 50 mEq, Calcium Chloride 1 g,Insulin regular 10 units, D50W IV 50 ml    4:04 PM - Paged Cardiology    CRITICAL CARE  The very real possibilty of a deterioration of this patient's condition required the highest level of my preparedness for sudden, emergent intervention.  I provided critical care services, which included medication orders, frequent reevaluations of the patient's condition and response to treatment, ordering and reviewing test results, and discussing the case with various consultants.  The critical care time associated with the care of the patient was 30 minutes. Review chart for interventions. This time is exclusive of any other billable procedures.     MEDICAL DECISION MAKING  This is a 69 y.o. female who presents with exertional dyspnea with chest tightness.  There is concerns for COPD exacerbation, pulmonary embolism, and cardiac disease.  Her troponin is elevated which is concerning for cardiac disease.  She also has a little worsening of her renal insufficiency along with a elevated potassium.  Her EKG does not show prolongation.  IV insulin, glucose, calcium chloride, and sodium bicarb were given to treat her hyperkalemia.  I spoke with the hospitalist for admission she will be admitted for further evaluation and treatment.      DISPOSITION:  Patient will be hospitalized by Dr. Chavez, Hospitalist, in critical condition.    FINAL IMPRESSION  1. Shortness of breath    2. Elevated troponin    3. Hyperkalemia      The critical care time associated with the care of the patient was 30 minutes. Review chart for interventions. This time is exclusive of any other billable procedures.      Roberto Carlos CLAY (Alla), am scribing for, and in the presence of, Charan Farr D.O..    Electronically signed by: Roberto Carlos Cortez (Alla), 6/15/2020    Charan CLAY D.O. personally performed the services described in this documentation, as scribed by Roberto Carlos Cortez in my  presence, and it is both accurate and complete. C.    The note accurately reflects work and decisions made by me.  Charan Farr D.O.  6/15/2020  6:15 PM

## 2020-06-15 NOTE — CONSULTS
"Reason for Consult:  Asked by ROBY Restrepo.OMelonie to see this patient with shortness of breath and hemoptysis    CC:   Chief Complaint   Patient presents with   • Shortness of Breath       HPI:     69 year old woman with PMH COPD (two prior intubations) on home O2, DM2, HTN, HLD, obesity, tobacco dependent (\"8 cigarettes per day\"), mitral stenosis presents with shortness of breath over last three weeks. States delayed medical attention as she lives in assisted living and would be on lockdown for two weeks in her studio if she were to be admitted to hospital and she feared this. She states she was intubated last year and has had intermittent hemoptysis since then. She now complains of shortness of breath, orthopnea, leg swelling, and hemoptysis. Denies contact with covid positive patients or recent illness.    Medications / Drug list prior to admission:  No current facility-administered medications on file prior to encounter.      Current Outpatient Medications on File Prior to Encounter   Medication Sig Dispense Refill   • amitriptyline (ELAVIL) 50 MG Tab Take 100 mg by mouth every evening.     • lactulose 10 GM/15ML Solution Take 20 g by mouth 2 times a day as needed.     • lidocaine (XYLOCAINE) 5 % Ointment Apply 1 Each to affected area(s) as needed.     • oxyCODONE immediate release (ROXICODONE) 10 MG immediate release tablet Take 10 mg by mouth 3 times a day as needed for Moderate Pain.     • traZODone (DESYREL) 150 MG Tab Take 300 mg by mouth 2 times a day.     • nystatin/triamcinolone (MYCOLOG) 294069-9.1 UNIT/GM-% Cream APPLY A THIN LAYER TO FUNGAL RASH ONCE DAILY AS NEEDED 30 g 0   • lisinopril (PRINIVIL) 5 MG Tab TAKE 1 TABLET BY MOUTH DAILY 30 Tab 11   • DULoxetine (CYMBALTA) 60 MG Cap DR Particles delayed-release capsule TAKE 1 CAPSULE BY MOUTH DAILY 90 Cap 1   • rosuvastatin (CRESTOR) 10 MG Tab TAKE 1 TABLET BY MOUTH EVERY EVENING 100 Tab 3   • levothyroxine (SYNTHROID) 75 MCG Tab TAKE 1 TABLET BY " MOUTH DAILY 90 Tab 1   • TRELEGY ELLIPTA 100-62.5-25 MCG/INH AEROSOL POWDER, BREATH ACTIVATED INHALE 1 PUFF BY MOUTH DAILY 3 Each 0   • cyclobenzaprine (FLEXERIL) 10 MG Tab Take 1 Tab by mouth every bedtime. 90 Tab 3   • Diclofenac Sodium 1 % Gel Apply 2 g to skin as directed 2 times a day as needed (for pain). 90 g 3   • ipratropium-albuterol (DUONEB) 0.5-2.5 (3) MG/3ML nebulizer solution 3 mL by Nebulization route every 6 hours as needed for Shortness of Breath. 30 Bullet 0   • insulin glargine (LANTUS) 100 UNIT/ML Solution Inject 20 Units as instructed every evening.     • albuterol 108 (90 Base) MCG/ACT Aero Soln inhalation aerosol Inhale 2 Puffs by mouth every four hours as needed for Shortness of Breath.  3       Current list of administered Medications:    Current Facility-Administered Medications:   •  albuterol inhaler 2 Puff, 2 Puff, Inhalation, Q4HRS PRN, Nataliia Chavez M.D.  •  amitriptyline (ELAVIL) tablet 100 mg, 100 mg, Oral, Nightly, Nataliia Chavez M.D.  •  insulin glargine (LANTUS) injection 20 Units, 20 Units, Subcutaneous, Nightly, Nataliia Chavez M.D.  •  [START ON 6/16/2020] levothyroxine (SYNTHROID) tablet 75 mcg, 75 mcg, Oral, AM ES, Nataliia Chavez M.D.  •  oxyCODONE immediate-release (ROXICODONE) tablet 10 mg, 10 mg, Oral, TID PRN, Nataliia Chavez M.D.  •  rosuvastatin (CRESTOR) tablet 10 mg, 10 mg, Oral, Q EVENING, Nataliia Chavez M.D.  •  traZODone (DESYREL) tablet 50 mg, 50 mg, Oral, BID, Nataliia Chavez M.D.  •  acetaminophen (TYLENOL) tablet 650 mg, 650 mg, Oral, Q6HRS PRN, Nataliia Chavez M.D.  •  senna-docusate (PERICOLACE or SENOKOT S) 8.6-50 MG per tablet 2 Tab, 2 Tab, Oral, BID **AND** polyethylene glycol/lytes (MIRALAX) PACKET 1 Packet, 1 Packet, Oral, QDAY PRN **AND** magnesium hydroxide (MILK OF MAGNESIA) suspension 30 mL, 30 mL, Oral, QDAY PRN **AND** bisacodyl (DULCOLAX) suppository 10 mg, 10 mg, Rectal, QDAY PRN, Nataliia Chavez M.D.  •  Notify provider if pain  remains uncontrolled, , , CONTINUOUS **AND** Use the numeric rating scale (NRS-11) on regular floors and Critical-Care Pain Observation Tool (CPOT) on ICUs/Trauma to assess pain, , , CONTINUOUS **AND** Pulse Ox (Oximetry), , , CONTINUOUS **AND** Pharmacy Consult Request ...Pain Management Review 1 Each, 1 Each, Other, PHARMACY TO DOSE **AND** If patient difficult to arouse and/or has respiratory depression, stop any opiates that are currently infusing and call a Rapid Response., , , CONTINUOUS **AND** HYDROmorphone pf (DILAUDID) injection 0.25 mg, 0.25 mg, Intravenous, Q3HRS PRN, Nataliia Chavez M.D.  •  guaiFENesin dextromethorphan (ROBITUSSIN DM) 100-10 MG/5ML syrup 10 mL, 10 mL, Oral, Q6HRS PRN, Nataliia Chavez M.D.  •  nicotine (NICODERM) 14 MG/24HR 14 mg, 14 mg, Transdermal, Daily-0600 **AND** Nicotine Replacement Patient Education Materials, , , Once **AND** nicotine polacrilex (NICORETTE) 2 MG piece 2 mg, 2 mg, Oral, Q HOUR PRN, Nataliia Chavez M.D.  •  insulin regular (HUMULIN R) injection 2-9 Units, 2-9 Units, Subcutaneous, 4X/DAY ACHS **AND** POC Blood Glucose, , , Q AC AND BEDTIME(S) **AND** NOTIFY MD and PharmD, , , Once **AND** glucose 4 g chewable tablet 16 g, 16 g, Oral, Q15 MIN PRN **AND** dextrose 50% (D50W) injection 50 mL, 50 mL, Intravenous, Q15 MIN PRN, Nataliia Chavez M.D.    Current Outpatient Medications:   •  amitriptyline (ELAVIL) 50 MG Tab, Take 100 mg by mouth every evening., Disp: , Rfl:   •  lactulose 10 GM/15ML Solution, Take 20 g by mouth 2 times a day as needed., Disp: , Rfl:   •  lidocaine (XYLOCAINE) 5 % Ointment, Apply 1 Each to affected area(s) as needed., Disp: , Rfl:   •  oxyCODONE immediate release (ROXICODONE) 10 MG immediate release tablet, Take 10 mg by mouth 3 times a day as needed for Moderate Pain., Disp: , Rfl:   •  traZODone (DESYREL) 150 MG Tab, Take 300 mg by mouth 2 times a day., Disp: , Rfl:   •  nystatin/triamcinolone (MYCOLOG) 218986-8.1 UNIT/GM-% Cream,  "APPLY A THIN LAYER TO FUNGAL RASH ONCE DAILY AS NEEDED, Disp: 30 g, Rfl: 0  •  lisinopril (PRINIVIL) 5 MG Tab, TAKE 1 TABLET BY MOUTH DAILY, Disp: 30 Tab, Rfl: 11  •  DULoxetine (CYMBALTA) 60 MG Cap DR Particles delayed-release capsule, TAKE 1 CAPSULE BY MOUTH DAILY, Disp: 90 Cap, Rfl: 1  •  rosuvastatin (CRESTOR) 10 MG Tab, TAKE 1 TABLET BY MOUTH EVERY EVENING, Disp: 100 Tab, Rfl: 3  •  levothyroxine (SYNTHROID) 75 MCG Tab, TAKE 1 TABLET BY MOUTH DAILY, Disp: 90 Tab, Rfl: 1  •  TRELEGY ELLIPTA 100-62.5-25 MCG/INH AEROSOL POWDER, BREATH ACTIVATED, INHALE 1 PUFF BY MOUTH DAILY, Disp: 3 Each, Rfl: 0  •  cyclobenzaprine (FLEXERIL) 10 MG Tab, Take 1 Tab by mouth every bedtime., Disp: 90 Tab, Rfl: 3  •  Diclofenac Sodium 1 % Gel, Apply 2 g to skin as directed 2 times a day as needed (for pain)., Disp: 90 g, Rfl: 3  •  ipratropium-albuterol (DUONEB) 0.5-2.5 (3) MG/3ML nebulizer solution, 3 mL by Nebulization route every 6 hours as needed for Shortness of Breath., Disp: 30 Bullet, Rfl: 0  •  insulin glargine (LANTUS) 100 UNIT/ML Solution, Inject 20 Units as instructed every evening., Disp: , Rfl:   •  albuterol 108 (90 Base) MCG/ACT Aero Soln inhalation aerosol, Inhale 2 Puffs by mouth every four hours as needed for Shortness of Breath., Disp: , Rfl: 3    Past Medical History:   Diagnosis Date   • Arthritis     \"everywhere\"   • ASTHMA    • Backpain     chronic back pain   • Breath shortness     \"only with flare up with allergies\"   • Bronchitis     as a child   • Congestive heart failure (HCC) 2013    related to pulmonary failure   • COPD    • Diabetes     Type 2   • Disorder of thyroid     Hypothyroid   • Fall    • Fibromyalgia    • GERD (gastroesophageal reflux disease)    • Heart burn    • Hepatitis C     \"I have markers in blood but not active\"   • Hypertension    • Indigestion    • Infectious disease     Hepatitis C   • Neuropathy (HCC)     bilat feet   • On home oxygen therapy    • Other specified symptom associated " "with female genital organs     Hysterectomy at age 22yrs   • Pain 9/13/2016    \"pain everywhere\"   • PND (post-nasal drip)    • Pneumonia     as a child   • Psychiatric problem 9/13/2016    PTSD   • RLS (restless legs syndrome)    • Sleep apnea     does not wear CPAP, \"can't do it\"   • Unspecified urinary incontinence        Past Surgical History:   Procedure Laterality Date   • ANKLE ORIF Left 5/8/2017    Procedure: ANKLE ORIF;  Surgeon: Rico Gtz M.D.;  Location: [facility];  Service:    • AK DSTR NROLYTC AGNT PARVERTEB FCT SNGL LMBR/SACRAL Left 9/16/2016    Procedure: NEURO DEST FACET L/S W/IG SNGL - L3-S1;  Surgeon: Xavier Arteaga D.O.;  Location: [facility];  Service: Pain Management   • AK DSTR NROLYTC AGNT PARVERTEB FCT ADDL LMBR/SACRAL  9/16/2016    Procedure: NEURO DEST FACET L/S W/IG ADDL;  Surgeon: Xavier Arteaga D.O.;  Location: [facility];  Service: Pain Management   • AK DSTR NROLYTC AGNT PARVERTEB FCT ADDL LMBR/SACRAL  9/16/2016    Procedure: NEURO DEST FACET L/S W/IG ADDL;  Surgeon: Xavier Arteaga D.O.;  Location: [facility];  Service: Pain Management   • PB FLUOROSCOPIC GUIDANCE NEEDLE PLACEMENT  9/16/2016    Procedure: FLOURO GUIDE NEEDLE PLACEMENT;  Surgeon: Xavier Arteaga D.O.;  Location: SURGERY [facility];  Service: Pain Management   • AK DSTR NROLYTC AGNT PARVERTEB FCT SNGL LMBR/SACRAL Right 11/6/2015    Procedure: NEURO DEST FACET L/S W/IG SNGL - L3-S1;  Surgeon: Xavier Arteaga D.O.;  Location: SURGERY [facility];  Service: Pain Management   • AK DSTR NROLYTC AGNT PARVERTEB FCT ADDL LMBR/SACRAL  11/6/2015    Procedure: NEURO DEST FACET L/S W/IG ADDL;  Surgeon: Xavier Arteaga D.O.;  Location: SURGERY [facility];  Service: Pain Management   • PB INJECT RX OTHER PERIPH NERVE  11/6/2015    Procedure: NEUROLYTIC DEST-OTHER NERVE;  Surgeon: Xavier Arteaga D.O.;  Location: SURGERY " [facility];  Service: Pain Management   • ND DSTR NROLYTC AGNT PARVERTEB FCT ADDL LMBR/SACRAL  11/6/2015    Procedure: NEURO DEST FACET L/S W/IG ADDL;  Surgeon: Xavier Arteaga D.O.;  Location: [facility];  Service: Pain Management   • ND DSTR NROLYTC AGNT PARVERTEB FCT SNGL LMBR/SACRAL Left 10/2/2015    Procedure: NEURO DEST FACET L/S W/IG SNGL - L3-S1;  Surgeon: Xavier Arteaga D.O.;  Location: [facility];  Service: Pain Management   • ND DSTR NROLYTC AGNT PARVERTEB FCT ADDL LMBR/SACRAL  10/2/2015    Procedure: NEURO DEST FACET L/S W/IG ADDL;  Surgeon: Xavier Arteaga D.O.;  Location: [facility];  Service: Pain Management   • PB INJECT RX OTHER PERIPH NERVE  10/2/2015    Procedure: NEUROLYTIC DEST-OTHER NERVE;  Surgeon: Xavier Arteaga D.O.;  Location: [facility];  Service: Pain Management   • ND DSTR NROLYTC AGNT PARVERTEB FCT ADDL LMBR/SACRAL  10/2/2015    Procedure: NEURO DEST FACET L/S W/IG ADDL;  Surgeon: Xavier Arteaga D.O.;  Location: [facility];  Service: Pain Management   • PB INJ DX/THER AGNT PARAVERT FACET JOINT, OBED* Left 7/17/2015    Procedure: INJ PARA FACET L/S 1 LVL W/IG - L3-S1;  Surgeon: Xavier Arteaga D.O.;  Location: [facility];  Service: Pain Management   • PB INJ DX/THER AGNT PARAVERT FACET JOINT, OBED*  7/17/2015    Procedure: INJ PARA FACET L/S 2D LVL W/IG;  Surgeon: Xavier Arteaga D.O.;  Location: [facility];  Service: Pain Management   • PB INJ DX/THER AGNT PARAVERT FACET JOINT, OBED*  7/17/2015    Procedure: INJ PARA FACET L/S 3D LVL W/IG;  Surgeon: Xavier Arteaga D.O.;  Location: [facility];  Service: Pain Management   • ND INJECT NERV BLCK,OTHR PERIPH NERV  7/17/2015    Procedure: INJ-ANES AGENT-OTHER PHER.NRVE;  Surgeon: Xavier Arteaga D.O.;  Location: [facility];  Service: Pain Management   • PB INJ DX/THER AGNT  PARAVERT FACET JOINT, OBED* Left 7/10/2015    Procedure: INJ PARA FACET L/S 1 LVL W/IG - L3-S1;  Surgeon: Xavier Arteaga D.O.;  Location: SURGERY SUR[facility];  Service: Pain Management   • PB INJ DX/THER AGNT PARAVERT FACET JOINT, OBED*  7/10/2015    Procedure: INJ PARA FACET L/S 2D LVL W/IG;  Surgeon: Xavier Arteaga D.O.;  Location: SURGERY SUR[facility];  Service: Pain Management   • PB INJ DX/THER AGNT PARAVERT FACET JOINT, OBED*  7/10/2015    Procedure: INJ PARA FACET L/S 3D LVL W/IG;  Surgeon: Xavier Arteaga D.O.;  Location: SURGERY [facility];  Service: Pain Management   • OK INJECT NERV AILEEN,ANGELA PERIPH NERV  7/10/2015    Procedure: INJ-ANES AGENT-OTHER PHER.NRVE;  Surgeon: Xavier Arteaga D.O.;  Location: SURGERY SUR[facility];  Service: Pain Management   • GYN SURGERY      hysterectomy    • LUMPECTOMY Left     Left breast   • OTHER ORTHOPEDIC SURGERY      L knee replacement    • OTHER ORTHOPEDIC SURGERY      R carpal tunnel    • US-NEEDLE CORE BX-BREAST PANEL         Family History   Problem Relation Age of Onset   • Lung Disease Mother    • Alcohol/Drug Mother    • Heart Disease Mother    • Cancer Father    • Cancer Brother    • Diabetes Brother    • Diabetes Maternal Grandmother    • Stroke Paternal Grandmother    • Heart Disease Paternal Grandmother      Patient family history was personally reviewed, no pertinent family history to current presentation    Social History     Socioeconomic History   • Marital status:      Spouse name: Not on file   • Number of children: Not on file   • Years of education: Not on file   • Highest education level: Not on file   Occupational History   • Not on file   Social Needs   • Financial resource strain: Not on file   • Food insecurity     Worry: Not on file     Inability: Not on file   • Transportation needs     Medical: Not on file     Non-medical: Not on file   Tobacco Use   • Smoking status: Current Every Day Smoker      "Packs/day: 0.25     Years: 50.00     Pack years: 12.50     Types: Cigarettes     Last attempt to quit: 3/20/2019     Years since quittin.2   • Smokeless tobacco: Never Used   Substance and Sexual Activity   • Alcohol use: No     Alcohol/week: 0.0 oz   • Drug use: No   • Sexual activity: Never   Lifestyle   • Physical activity     Days per week: Not on file     Minutes per session: Not on file   • Stress: Not on file   Relationships   • Social connections     Talks on phone: Not on file     Gets together: Not on file     Attends Christianity service: Not on file     Active member of club or organization: Not on file     Attends meetings of clubs or organizations: Not on file     Relationship status: Not on file   • Intimate partner violence     Fear of current or ex partner: Not on file     Emotionally abused: Not on file     Physically abused: Not on file     Forced sexual activity: Not on file   Other Topics Concern   • Not on file   Social History Narrative   • Not on file       ALLERGIES:  Allergies   Allergen Reactions   • Doxycycline Itching   • Vitamin C Diarrhea     \"violent diarrhea\"   • Codeine Nausea     Severe stomach pain   • Gabapentin Palpitations     Gets shaky and falls    • Keflex Rash     Severe rash, but TOLERATES PENICILLINS, Rocephin    • Lyrica Nausea     Nausea, dizzy   • Powders    • Sudafed Nausea and Unspecified     Chest pain, nausea   • Citrus        Review of systems:  A complete review of symptoms was reviewed with patient. This is reviewed in H&P and PMH. ALL OTHERS reviewed and negative    Physical exam:  Patient Vitals for the past 24 hrs:   BP Temp Pulse Resp SpO2 Height Weight   06/15/20 1630 -- -- 90 (!) 63 99 % -- --   06/15/20 1600 (!) 167/79 -- 91 -- -- -- --   06/15/20 1530 158/78 -- 75 -- 98 % -- --   06/15/20 1500 155/72 -- 79 -- 99 % -- --   06/15/20 1400 135/62 -- 75 -- 98 % -- --   06/15/20 1230 143/63 -- 75 20 97 % -- --   06/15/20 1225 124/59 36.8 °C (98.2 °F) 80 (!) " "22 97 % 1.651 m (5' 5\") 99.8 kg (220 lb)   06/15/20 1224 -- -- 80 (!) 22 97 % -- --   06/15/20 1221 124/59 -- 81 -- -- -- --     General: No acute distress.   EYES: no jaundice  HEENT: OP clear   Neck: No bruits No JVD.   CVS:  RRR. S1 + S2. No M/R/G. 1+ LE pitting edema.  Resp: ++wheezing  Abdomen: Soft, NT, ND,  Skin: Grossly nothing acute no obvious rashes  Neurological: Alert, Moves all extremities, no cranial nerve defects on limited exam  Extremities: Pulse 2+ in b/l LE. No cyanosis.     Data:  Laboratory studies personally reviewed by me:  Recent Results (from the past 24 hour(s))   CBC with Differential    Collection Time: 06/15/20 12:24 PM   Result Value Ref Range    WBC 11.6 (H) 4.8 - 10.8 K/uL    RBC 2.43 (L) 4.20 - 5.40 M/uL    Hemoglobin 7.6 (L) 12.0 - 16.0 g/dL    Hematocrit 25.3 (L) 37.0 - 47.0 %    .1 (H) 81.4 - 97.8 fL    MCH 31.3 27.0 - 33.0 pg    MCHC 30.0 (L) 33.6 - 35.0 g/dL    RDW 49.1 35.9 - 50.0 fL    Platelet Count 227 164 - 446 K/uL    MPV 9.1 9.0 - 12.9 fL    Neutrophils-Polys 81.70 (H) 44.00 - 72.00 %    Lymphocytes 10.40 (L) 22.00 - 41.00 %    Monocytes 5.60 0.00 - 13.40 %    Eosinophils 1.30 0.00 - 6.90 %    Basophils 0.30 0.00 - 1.80 %    Immature Granulocytes 0.70 0.00 - 0.90 %    Nucleated RBC 0.00 /100 WBC    Neutrophils (Absolute) 9.51 (H) 2.00 - 7.15 K/uL    Lymphs (Absolute) 1.21 1.00 - 4.80 K/uL    Monos (Absolute) 0.65 0.00 - 0.85 K/uL    Eos (Absolute) 0.15 0.00 - 0.51 K/uL    Baso (Absolute) 0.03 0.00 - 0.12 K/uL    Immature Granulocytes (abs) 0.08 0.00 - 0.11 K/uL    NRBC (Absolute) 0.00 K/uL   Complete Metabolic Panel (CMP)    Collection Time: 06/15/20 12:24 PM   Result Value Ref Range    Sodium 138 135 - 145 mmol/L    Potassium 5.9 (H) 3.6 - 5.5 mmol/L    Chloride 102 96 - 112 mmol/L    Co2 24 20 - 33 mmol/L    Anion Gap 12.0 7.0 - 16.0    Glucose 158 (H) 65 - 99 mg/dL    Bun 40 (H) 8 - 22 mg/dL    Creatinine 2.29 (H) 0.50 - 1.40 mg/dL    Calcium 9.1 8.5 - 10.5 " mg/dL    AST(SGOT) 13 12 - 45 U/L    ALT(SGPT) 19 2 - 50 U/L    Alkaline Phosphatase 59 30 - 99 U/L    Total Bilirubin 0.2 0.1 - 1.5 mg/dL    Albumin 3.4 3.2 - 4.9 g/dL    Total Protein 6.4 6.0 - 8.2 g/dL    Globulin 3.0 1.9 - 3.5 g/dL    A-G Ratio 1.1 g/dL   Troponin    Collection Time: 06/15/20 12:24 PM   Result Value Ref Range    Troponin T 100 (H) 6 - 19 ng/L   ESTIMATED GFR    Collection Time: 06/15/20 12:24 PM   Result Value Ref Range    GFR If African American 25 (A) >60 mL/min/1.73 m 2    GFR If Non African American 21 (A) >60 mL/min/1.73 m 2   EKG    Collection Time: 06/15/20 12:31 PM   Result Value Ref Range    Report       [facility] Emergency Dept.    Test Date:  2020-06-15  Pt Name:    CHAITANYA CABELLO            Department: ER  MRN:        [id number]                      Room:       [facility]  Gender:     Female                       Technician: [id number]  :        [id number]                   Requested By:ER TRIAGE PROTOCOL  Order #:    [id number]                    Reading MD: LISSA PAN D.O.    Measurements  Intervals                                Axis  Rate:       78                           P:  OH:                                      QRS:        -1  QRSD:       90                           T:          94  QT:         368  QTc:        420    Interpretive Statements  Sinus rhythm  BORDERLINE R WAVE PROGRESSION, ANTERIOR LEADS  BORDERLINE REPOLARIZATION ABNORMALITY  Compared to ECG 2019 17:39:19  Accelerated junctional rhythm now present  Electronically Signed On 6- 15:29:18 PDT by LISSA PAN D.O.     LACTIC ACID    Collection Time: 06/15/20  1:25 PM   Result Value Ref Range    Lactic Acid 1.5 0.5 - 2.0 mmol/L   COVID/SARS CoV-2 PCR    Collection Time: 06/15/20  2:20 PM    Specimen: Nasopharyngeal; Respirate   Result Value Ref Range    COVID Order Status Received    SARS-CoV-2, PCR (In-House)    Collection Time: 06/15/20  2:20 PM   Result Value Ref Range     "SARS-CoV-2 Source NP Swab     SARS-CoV-2 by PCR NotDetected NotDetected       EKG : personally reviewed by me   6/15/20 sinus, poor r wave progression, TWI I and aVL - unchanged from 7//20/2019    All pertinent features of laboratory and imaging reviewed including primary images where applicable    TTE 6/5/2019  CONCLUSIONS  Normal left ventricular systolic function.  Left ventricular ejection fraction is visually estimated to be 60%.  Grade II diastolic dysfunction.  The right ventricle was normal in size and function.  Moderate mitral stenosis.  Mean transvalvular gradient is 9 mmHg at a heart rate of  BPM.  Mild aortic stenosis.  Compared to the images of the prior study done 3/16/2017 -  there is   now evidence for mild aortic stenosis. The estimate of right   ventricular systolic pressure cannot be made on the current exam,   previously 60 mmHg.    Nuclear stress spect lexiscan 2017   NUCLEAR IMAGING INTERPRETATION    No myocardial infarct or reversible ischemia.    Normal LEFT ventricular function.    ECG INTERPRETATION    Negative stress ECG for ischemia.     Active Problems:    DM type 2, uncontrolled, with renal complications (HCC) (Chronic) POA: Yes    Iron deficiency anemia POA: Yes    COPD (chronic obstructive pulmonary disease) (HCC) (Chronic) POA: Yes      Overview: On oxygen 3L nocturnal    CKD (chronic kidney disease), stage IV (HCC) POA: Yes    Essential hypertension POA: Yes    Tobacco dependence POA: Yes    Dyslipidemia POA: Yes    Obesity (BMI 30-39.9) POA: Yes  Resolved Problems:    * No resolved hospital problems. *      Assessment / Plan:    69 year old woman with PMH COPD (two prior intubations) on home O2, DM2, HTN, HLD, obesity, tobacco dependent (\"8 cigarettes per day\"), mitral stenosis presents with shortness of breath over last three weeks.    -would eval hemoptysis with chest CT w IV contrast  -repeat TTE; previously read as moderate but likely more severe  -rate control as close to 60 " bpm as possible with metoprolol  -diuresis with lasix 40mg IV daily  -appreciate medicine help with remaining issues; especially COPD and anemia    I personally discussed her case with Dr Farr    It is my pleasure to participate in the care of Ms. Callahan.  Please do not hesitate to contact me with questions or concerns.    Trip Morton MD  Cardiologist [facility]

## 2020-06-15 NOTE — H&P
Hospital Medicine History & Physical Note    Date of Service  6/15/2020    Primary Care Physician  Pcp Not In Computer    Code Status  Full Code    Chief Complaint  Chief Complaint   Patient presents with   • Shortness of Breath       History of Presenting Illness  69 y.o. female with a past medical history of diabetes, chronic anemia, chronic obstructive pulmonary disease, chronic hypoxemic respiratory failure on 4 L of oxygen, chronic kidney disease stage IV, hypertension, tobacco use and dyslipidemia who presented 6/15/2020 with worsening shortness of breath and generalized weakness, and increase in her cough.  Patient reports feeling more weak and easily fatigable over the past 3 days she also noticed increased production of sputum that is clear-white did notice a streak of blood on 2 occasions.  Patient did receive a dose of intravenous steroids in route to the emergency department with some improvement of her shortness of breath     Patient was noticed to have hyperkalemia in the emergency department she was given bicarb calcium insulin and dextrose.    Patient was noticed to have elevated troponin in the emergency department cardiology were consulted by the emergency department provider.    Diffuse interstitial prominence could relate to mild pulmonary edema.     Review of Systems  Review of Systems   Constitutional: Positive for malaise/fatigue. Negative for chills and fever.   HENT: Positive for congestion. Negative for ear discharge, ear pain and sore throat.    Eyes: Negative for photophobia, pain and discharge.   Respiratory: Positive for cough, sputum production, shortness of breath and wheezing. Negative for hemoptysis and stridor.    Cardiovascular: Negative for chest pain, palpitations and leg swelling.   Gastrointestinal: Negative for abdominal pain, blood in stool, diarrhea and vomiting.   Genitourinary: Negative for flank pain, hematuria and urgency.   Musculoskeletal: Negative for falls and  myalgias.   Skin: Negative.    Neurological: Negative for speech change, focal weakness, seizures and loss of consciousness.   Endo/Heme/Allergies: Negative for polydipsia. Does not bruise/bleed easily.   Psychiatric/Behavioral: Negative for hallucinations and suicidal ideas. The patient is not nervous/anxious.        Past Medical History   has a past medical history of Arthritis, ASTHMA, Backpain, Breath shortness, Bronchitis, Congestive heart failure (HCC) (2013), COPD, Diabetes, Disorder of thyroid, Fall, Fibromyalgia, GERD (gastroesophageal reflux disease), Heart burn, Hepatitis C, Hypertension, Indigestion, Infectious disease, Neuropathy (HCC), On home oxygen therapy, Other specified symptom associated with female genital organs, Pain (9/13/2016), PND (post-nasal drip), Pneumonia, Psychiatric problem (9/13/2016), RLS (restless legs syndrome), Sleep apnea, and Unspecified urinary incontinence.    Surgical History   has a past surgical history that includes gyn surgery; other orthopedic surgery; other orthopedic surgery; us-needle core bx-breast panel; lumpectomy (Left); pr inj dx/ther agnt paravert facet joint, bruce* (Left, 7/10/2015); pr inj dx/ther agnt paravert facet joint, bruce* (7/10/2015); pr inj dx/ther agnt paravert facet joint, bruce* (7/10/2015); pr inj(s) nerve block other periphal (7/10/2015); pr inj dx/ther agnt paravert facet joint, bruce* (Left, 7/17/2015); pr inj dx/ther agnt paravert facet joint, bruce* (7/17/2015); pr inj dx/ther agnt paravert facet joint, bruce* (7/17/2015); pr inj(s) nerve block other periphal (7/17/2015); pr dstr nrolytc agnt parverteb fct sngl lmbr/sacral (Left, 10/2/2015); pr dstr nrolytc agnt parverteb fct addl lmbr/sacral (10/2/2015); pr inject rx other periph nerve (10/2/2015); pr dstr nrolytc agnt parverteb fct addl lmbr/sacral (10/2/2015); pr dstr nrolytc agnt parverteb fct sngl lmbr/sacral (Right, 11/6/2015); pr dstr nrolytc agnt parverteb fct addl lmbr/sacral (11/6/2015); pr inject  "rx other periph nerve (11/6/2015); pr dstr nrolytc agnt parverteb fct addl lmbr/sacral (11/6/2015); pr dstr nrolytc agnt parverteb fct sngl lmbr/sacral (Left, 9/16/2016); pr dstr nrolytc agnt parverteb fct addl lmbr/sacral (9/16/2016); pr dstr nrolytc agnt parverteb fct addl lmbr/sacral (9/16/2016); pr fluoroscopic guidance needle placement (9/16/2016); and ankle orif (Left, 5/8/2017).     Family History  family history includes Alcohol/Drug in her mother; Cancer in her brother and father; Diabetes in her brother and maternal grandmother; Heart Disease in her mother and paternal grandmother; Lung Disease in her mother; Stroke in her paternal grandmother.     Social History   reports that she has been smoking cigarettes. She has a 12.50 pack-year smoking history. She has never used smokeless tobacco. She reports that she does not drink alcohol or use drugs.    Allergies  Allergies   Allergen Reactions   • Doxycycline Itching   • Vitamin C Diarrhea     \"violent diarrhea\"   • Codeine Nausea     Severe stomach pain   • Gabapentin Palpitations     Gets shaky and falls    • Keflex Rash     Severe rash, but TOLERATES PENICILLINS, Rocephin    • Lyrica Nausea     Nausea, dizzy   • Powders    • Sudafed Nausea and Unspecified     Chest pain, nausea   • Citrus        Medications  Prior to Admission Medications   Prescriptions Last Dose Informant Patient Reported? Taking?   DULoxetine (CYMBALTA) 60 MG Cap DR Particles delayed-release capsule 6/15/2020 at AM Patient No No   Sig: TAKE 1 CAPSULE BY MOUTH DAILY   Diclofenac Sodium 1 % Gel 6/14/2020 at PM Patient No No   Sig: Apply 2 g to skin as directed 2 times a day as needed (for pain).   TRELEGY ELLIPTA 100-62.5-25 MCG/INH AEROSOL POWDER, BREATH ACTIVATED 6/15/2020 at AM Patient No No   Sig: INHALE 1 PUFF BY MOUTH DAILY   albuterol 108 (90 Base) MCG/ACT Aero Soln inhalation aerosol FEW DAYS AGO at OUT Patient Yes No   Sig: Inhale 2 Puffs by mouth every four hours as needed for " Shortness of Breath.   amitriptyline (ELAVIL) 50 MG Tab 6/14/2020 at PM Patient Yes Yes   Sig: Take 100 mg by mouth every evening.   cyclobenzaprine (FLEXERIL) 10 MG Tab 6/14/2020 at PM Patient No No   Sig: Take 1 Tab by mouth every bedtime.   insulin glargine (LANTUS) 100 UNIT/ML Solution 6/14/2020 at PM Patient Yes No   Sig: Inject 20 Units as instructed every evening.   ipratropium-albuterol (DUONEB) 0.5-2.5 (3) MG/3ML nebulizer solution 6/14/2020 at PM Patient No No   Sig: 3 mL by Nebulization route every 6 hours as needed for Shortness of Breath.   lactulose 10 GM/15ML Solution 6/14/2020 at PM Patient Yes Yes   Sig: Take 20 g by mouth 2 times a day as needed.   levothyroxine (SYNTHROID) 75 MCG Tab 6/15/2020 at AM Patient No No   Sig: TAKE 1 TABLET BY MOUTH DAILY   lidocaine (XYLOCAINE) 5 % Ointment PRN at NVN Patient Yes Yes   Sig: Apply 1 Each to affected area(s) as needed.   lisinopril (PRINIVIL) 5 MG Tab 6/15/2020 at AM Patient No No   Sig: TAKE 1 TABLET BY MOUTH DAILY   nystatin/triamcinolone (MYCOLOG) 959126-2.1 UNIT/GM-% Cream FEW DAYS AGO at [facility] Patient No No   Sig: APPLY A THIN LAYER TO FUNGAL RASH ONCE DAILY AS NEEDED   oxyCODONE immediate release (ROXICODONE) 10 MG immediate release tablet 6/15/2020 at AFTERNOON Patient Yes Yes   Sig: Take 10 mg by mouth 3 times a day as needed for Moderate Pain.   rosuvastatin (CRESTOR) 10 MG Tab 6/14/2020 at PM Patient No No   Sig: TAKE 1 TABLET BY MOUTH EVERY EVENING   traZODone (DESYREL) 150 MG Tab 6/15/2020 at AM Patient Yes Yes   Sig: Take 300 mg by mouth 2 times a day.      Facility-Administered Medications: None       Physical Exam  Temp:  [36.5 °C (97.7 °F)-36.8 °C (98.2 °F)] 36.5 °C (97.7 °F)  Pulse:  [75-91] 78  Resp:  [18-63] 18  BP: (123-167)/() 123/103  SpO2:  [97 %-99 %] 98 %    Physical Exam  Vitals signs and nursing note reviewed.   Constitutional:       General: She is not in acute distress.     Appearance: Normal appearance.   HENT:      Head:  Normocephalic and atraumatic.      Right Ear: External ear normal.      Left Ear: External ear normal.      Nose: Nose normal.      Mouth/Throat:      Mouth: Mucous membranes are moist.   Eyes:      General:         Right eye: No discharge.         Left eye: No discharge.      Extraocular Movements: Extraocular movements intact.      Pupils: Pupils are equal, round, and reactive to light.   Neck:      Musculoskeletal: Normal range of motion and neck supple.   Cardiovascular:      Rate and Rhythm: Tachycardia present.      Heart sounds: No friction rub. No gallop.       Comments: Faint heart sounds  Pulmonary:      Effort: Pulmonary effort is normal.      Breath sounds: No stridor. Decreased breath sounds and wheezing present.   Abdominal:      General: Abdomen is flat. There is no distension.      Tenderness: There is no abdominal tenderness.   Musculoskeletal: Normal range of motion.         General: Swelling present. No deformity.      Right lower leg: Edema present.      Left lower leg: Edema present.   Skin:     General: Skin is warm and dry.      Capillary Refill: Capillary refill takes 2 to 3 seconds.   Neurological:      General: No focal deficit present.      Mental Status: She is alert and oriented to person, place, and time.   Psychiatric:         Mood and Affect: Mood normal.         Behavior: Behavior normal.         Laboratory:  Recent Labs     06/15/20  1224   WBC 11.6*   RBC 2.43*   HEMOGLOBIN 7.6*   HEMATOCRIT 25.3*   .1*   MCH 31.3   MCHC 30.0*   RDW 49.1   PLATELETCT 227   MPV 9.1     Recent Labs     06/15/20  1224   SODIUM 138   POTASSIUM 5.9*   CHLORIDE 102   CO2 24   GLUCOSE 158*   BUN 40*   CREATININE 2.29*   CALCIUM 9.1     Recent Labs     06/15/20  1224   ALTSGPT 19   ASTSGOT 13   ALKPHOSPHAT 59   TBILIRUBIN 0.2   GLUCOSE 158*         No results for input(s): NTPROBNP in the last 72 hours.      Recent Labs     06/15/20  1224   TROPONINT 100*       Imaging:  DX-CHEST-PORTABLE (1 VIEW)    Final Result         Diffuse interstitial prominence could relate to mild pulmonary edema.      Stable cardiomegaly.      EC-ECHOCARDIOGRAM COMPLETE W/O CONT    (Results Pending)     I personally reviewed patients EKG shows a sinus rhythm with a rate of 78, no ST elevation, T wave inversion and ST depression in lead I, II, V4-6, QTc 420     Assessment/Plan:    * Chronic obstructive pulmonary disease with acute exacerbation (HCC)- (present on admission)  Assessment & Plan  Oxygen as needed, Respiratory protocol, Bronchodilators, Incentive spirometry  With exacerbation  Steroids for 5 days      Elevated troponin  Assessment & Plan  Has worsening shortness of breath  Troponin I 100.  EKG shows a sinus rhythm with a rate of 78, no ST elevation, T wave inversion and ST depression in lead I, II, V4-6, QTc 420   Continuous cardiac monitoring    Stat EKG, troponin for chest pain or worsening shortness of breath   Cardiology consulted, will check echocardiography     Hyperkalemia  Assessment & Plan  Likely secondary to worsening renal function and ACE inhibitor  The patient was given bicarb calcium insulin and dextrose.  Continuous cardiac monitoring  Hold lisinopril.      ACP (advance care planning)  Assessment & Plan  I had a prolonged discussion with the patient and regarding goals of care, diagnoses, prognosis, and CODE STATUS.   We discussed her prognosis and comorbidities.   Patient has advanced age, severe chronic obstructive pulmonary disease, chronic episodic respiratory failure, chronic kidney disease stage IV and poor functional performance comes today with progressive shortness of breath generalized weakness and diffuse pains, and hyperkalemia.  I discussed considering changing code to DNAR/DNI, I explained hospice/comfort care approach.  At this point patient the patient wants to continue with aggressive therapy and maintain a full code at this point.      CKD (chronic kidney disease), stage IV (HCC)- (present  on admission)  Assessment & Plan  Avoid nephrotoxins as much as possible, renally dose medications, monitor inputs and outputs      Mixed anemia- (present on admission)  Assessment & Plan  Her hemoglobin is close to baseline, no evidence of overt bleeding.  Microcytic with raised RDW, likely mixed chronic inflammation, iron deficiency, kidney disease   We will check iron studies, consider erythropoietin if iron studies within normal limits   Transfuse for hemoglobin of less than or equal to 7     DM type 2, uncontrolled, with renal complications (HCC)- (present on admission)  Assessment & Plan  With hyperglycemia  Glycated hemoglobin 6.71-year ago  Long & short acting insulin  Accu-Checks, hypoglycemia protocol     Obesity (BMI 30-39.9)- (present on admission)  Assessment & Plan  Body mass index is 36.61 kg/m².  Counseling provided.    I explained that there is increase in mortality and morbidity associated with excess weight.  There is increased risk of diabetes, hypertension, heart disease, cerebrovascular accidents, sleep apnea and cancer.    I encouraged the patient to lose weight by reducing caloric intake, I encouraged low-carb diet in addition to aim for 120-150 minutes of exercise a week.      Dyslipidemia- (present on admission)  Assessment & Plan  Resume home rosuvastatin    Tobacco dependence- (present on admission)  Assessment & Plan  I spent nearly 4 minutes on Tobacco cessation education and counseling.   I Discussed the benefits of quitting smoking and risks of continued smoking including cardiovascular disease, cancer and COPD, which she already has.   Discussed options of nicotine patch, medical treatment with Wellbutrin [Bupropion] and Chantix [Varenicline].      Essential hypertension- (present on admission)  Assessment & Plan  Patient appears slightly volume overloaded will start intravenous Lasix.  We will hold home lisinopril for now given her worsening renal function  Vital signs per  policy    I had a prolonged discussion with the patient and regarding goals of care, diagnoses, prognosis, and CODE STATUS.   We discussed her prognosis and comorbidities.   Patient has advanced age, severe chronic obstructive pulmonary disease, chronic episodic respiratory failure, chronic kidney disease stage IV and poor functional performance comes today with progressive shortness of breath generalized weakness and diffuse pains, and hyperkalemia.  I discussed considering changing code to DNAR/DNI, I explained hospice/comfort care approach.  At this point patient the patient wants to continue with aggressive therapy and maintain a full code at this point.  I spent 28 minutes on advanced care planning in addition to the time spent managing the other medical problems.

## 2020-06-16 ENCOUNTER — APPOINTMENT (OUTPATIENT)
Dept: RADIOLOGY | Facility: MEDICAL CENTER | Age: 70
DRG: 190 | End: 2020-06-16
Attending: INTERNAL MEDICINE
Payer: MEDICARE

## 2020-06-16 PROBLEM — I50.33 ACUTE ON CHRONIC DIASTOLIC CONGESTIVE HEART FAILURE (HCC): Status: ACTIVE | Noted: 2019-06-15

## 2020-06-16 LAB
ABO GROUP BLD: ABNORMAL
ALBUMIN SERPL BCP-MCNC: 3.1 G/DL (ref 3.2–4.9)
ALBUMIN/GLOB SERPL: 1.1 G/DL
ALP SERPL-CCNC: 53 U/L (ref 30–99)
ALT SERPL-CCNC: 20 U/L (ref 2–50)
ANION GAP SERPL CALC-SCNC: 8 MMOL/L (ref 7–16)
ANION GAP SERPL CALC-SCNC: 8 MMOL/L (ref 7–16)
AST SERPL-CCNC: 15 U/L (ref 12–45)
BARCODED ABORH UBTYP: 5100
BARCODED PRD CODE UBPRD: ABNORMAL
BARCODED UNIT NUM UBUNT: ABNORMAL
BASOPHILS # BLD AUTO: 0.1 % (ref 0–1.8)
BASOPHILS # BLD: 0.01 K/UL (ref 0–0.12)
BILIRUB SERPL-MCNC: 0.2 MG/DL (ref 0.1–1.5)
BLD GP AB SCN SERPL QL: ABNORMAL
BUN SERPL-MCNC: 50 MG/DL (ref 8–22)
BUN SERPL-MCNC: 51 MG/DL (ref 8–22)
CALCIUM SERPL-MCNC: 10.8 MG/DL (ref 8.5–10.5)
CALCIUM SERPL-MCNC: 9.9 MG/DL (ref 8.5–10.5)
CHLORIDE SERPL-SCNC: 100 MMOL/L (ref 96–112)
CHLORIDE SERPL-SCNC: 99 MMOL/L (ref 96–112)
CO2 SERPL-SCNC: 27 MMOL/L (ref 20–33)
CO2 SERPL-SCNC: 28 MMOL/L (ref 20–33)
COMPONENT R 8504R: ABNORMAL
CREAT SERPL-MCNC: 2.17 MG/DL (ref 0.5–1.4)
CREAT SERPL-MCNC: 2.26 MG/DL (ref 0.5–1.4)
EKG IMPRESSION: NORMAL
EKG IMPRESSION: NORMAL
EOSINOPHIL # BLD AUTO: 0 K/UL (ref 0–0.51)
EOSINOPHIL NFR BLD: 0 % (ref 0–6.9)
ERYTHROCYTE [DISTWIDTH] IN BLOOD BY AUTOMATED COUNT: 47.3 FL (ref 35.9–50)
ERYTHROCYTE [DISTWIDTH] IN BLOOD BY AUTOMATED COUNT: 53.3 FL (ref 35.9–50)
FERRITIN SERPL-MCNC: 1057 NG/ML (ref 10–291)
GLOBULIN SER CALC-MCNC: 2.8 G/DL (ref 1.9–3.5)
GLUCOSE BLD-MCNC: 140 MG/DL (ref 65–99)
GLUCOSE BLD-MCNC: 141 MG/DL (ref 65–99)
GLUCOSE BLD-MCNC: 143 MG/DL (ref 65–99)
GLUCOSE BLD-MCNC: 144 MG/DL (ref 65–99)
GLUCOSE BLD-MCNC: 198 MG/DL (ref 65–99)
GLUCOSE SERPL-MCNC: 152 MG/DL (ref 65–99)
GLUCOSE SERPL-MCNC: 199 MG/DL (ref 65–99)
HCT VFR BLD AUTO: 21.8 % (ref 37–47)
HCT VFR BLD AUTO: 26.2 % (ref 37–47)
HGB BLD-MCNC: 6.7 G/DL (ref 12–16)
HGB BLD-MCNC: 8.2 G/DL (ref 12–16)
IMM GRANULOCYTES # BLD AUTO: 0.08 K/UL (ref 0–0.11)
IMM GRANULOCYTES NFR BLD AUTO: 0.8 % (ref 0–0.9)
IRON SATN MFR SERPL: 17 % (ref 15–55)
IRON SERPL-MCNC: 29 UG/DL (ref 40–170)
LV EJECT FRACT  99904: 65
LV EJECT FRACT MOD 2C 99903: 51.4
LV EJECT FRACT MOD 4C 99902: 61.79
LV EJECT FRACT MOD BP 99901: 58.47
LYMPHOCYTES # BLD AUTO: 0.26 K/UL (ref 1–4.8)
LYMPHOCYTES NFR BLD: 2.7 % (ref 22–41)
MAGNESIUM SERPL-MCNC: 2.2 MG/DL (ref 1.5–2.5)
MCH RBC QN AUTO: 30.7 PG (ref 27–33)
MCH RBC QN AUTO: 31 PG (ref 27–33)
MCHC RBC AUTO-ENTMCNC: 30.7 G/DL (ref 33.6–35)
MCHC RBC AUTO-ENTMCNC: 31.3 G/DL (ref 33.6–35)
MCV RBC AUTO: 100.9 FL (ref 81.4–97.8)
MCV RBC AUTO: 98.1 FL (ref 81.4–97.8)
MONOCYTES # BLD AUTO: 0.26 K/UL (ref 0–0.85)
MONOCYTES NFR BLD AUTO: 2.7 % (ref 0–13.4)
NEUTROPHILS # BLD AUTO: 9.11 K/UL (ref 2–7.15)
NEUTROPHILS NFR BLD: 93.7 % (ref 44–72)
NRBC # BLD AUTO: 0 K/UL
NRBC BLD-RTO: 0 /100 WBC
PLATELET # BLD AUTO: 178 K/UL (ref 164–446)
PLATELET # BLD AUTO: 208 K/UL (ref 164–446)
PMV BLD AUTO: 9.1 FL (ref 9–12.9)
PMV BLD AUTO: 9.4 FL (ref 9–12.9)
POTASSIUM SERPL-SCNC: 5.6 MMOL/L (ref 3.6–5.5)
POTASSIUM SERPL-SCNC: 5.9 MMOL/L (ref 3.6–5.5)
POTASSIUM SERPL-SCNC: 6.3 MMOL/L (ref 3.6–5.5)
POTASSIUM SERPL-SCNC: 6.4 MMOL/L (ref 3.6–5.5)
PRODUCT TYPE UPROD: ABNORMAL
PROT SERPL-MCNC: 5.9 G/DL (ref 6–8.2)
RBC # BLD AUTO: 2.16 M/UL (ref 4.2–5.4)
RBC # BLD AUTO: 2.67 M/UL (ref 4.2–5.4)
RH BLD: ABNORMAL
SODIUM SERPL-SCNC: 135 MMOL/L (ref 135–145)
SODIUM SERPL-SCNC: 135 MMOL/L (ref 135–145)
TIBC SERPL-MCNC: 168 UG/DL (ref 250–450)
UIBC SERPL-MCNC: 139 UG/DL (ref 110–370)
UNIT STATUS USTAT: ABNORMAL
WBC # BLD AUTO: 16.5 K/UL (ref 4.8–10.8)
WBC # BLD AUTO: 9.7 K/UL (ref 4.8–10.8)

## 2020-06-16 PROCEDURE — 700111 HCHG RX REV CODE 636 W/ 250 OVERRIDE (IP): Performed by: INTERNAL MEDICINE

## 2020-06-16 PROCEDURE — 93010 ELECTROCARDIOGRAM REPORT: CPT | Performed by: INTERNAL MEDICINE

## 2020-06-16 PROCEDURE — 700102 HCHG RX REV CODE 250 W/ 637 OVERRIDE(OP): Performed by: INTERNAL MEDICINE

## 2020-06-16 PROCEDURE — 36415 COLL VENOUS BLD VENIPUNCTURE: CPT

## 2020-06-16 PROCEDURE — 99233 SBSQ HOSP IP/OBS HIGH 50: CPT | Performed by: INTERNAL MEDICINE

## 2020-06-16 PROCEDURE — A9270 NON-COVERED ITEM OR SERVICE: HCPCS | Performed by: PHYSICIAN ASSISTANT

## 2020-06-16 PROCEDURE — 700111 HCHG RX REV CODE 636 W/ 250 OVERRIDE (IP): Performed by: HOSPITALIST

## 2020-06-16 PROCEDURE — 85027 COMPLETE CBC AUTOMATED: CPT

## 2020-06-16 PROCEDURE — 86900 BLOOD TYPING SEROLOGIC ABO: CPT

## 2020-06-16 PROCEDURE — 80053 COMPREHEN METABOLIC PANEL: CPT

## 2020-06-16 PROCEDURE — 71250 CT THORAX DX C-: CPT

## 2020-06-16 PROCEDURE — 93005 ELECTROCARDIOGRAM TRACING: CPT | Performed by: INTERNAL MEDICINE

## 2020-06-16 PROCEDURE — 94669 MECHANICAL CHEST WALL OSCILL: CPT

## 2020-06-16 PROCEDURE — 93010 ELECTROCARDIOGRAM REPORT: CPT | Mod: 76 | Performed by: INTERNAL MEDICINE

## 2020-06-16 PROCEDURE — 94760 N-INVAS EAR/PLS OXIMETRY 1: CPT

## 2020-06-16 PROCEDURE — 99291 CRITICAL CARE FIRST HOUR: CPT | Performed by: INTERNAL MEDICINE

## 2020-06-16 PROCEDURE — 83550 IRON BINDING TEST: CPT

## 2020-06-16 PROCEDURE — 700111 HCHG RX REV CODE 636 W/ 250 OVERRIDE (IP): Performed by: PHYSICIAN ASSISTANT

## 2020-06-16 PROCEDURE — 700101 HCHG RX REV CODE 250: Performed by: HOSPITALIST

## 2020-06-16 PROCEDURE — 86901 BLOOD TYPING SEROLOGIC RH(D): CPT

## 2020-06-16 PROCEDURE — 94640 AIRWAY INHALATION TREATMENT: CPT

## 2020-06-16 PROCEDURE — 36430 TRANSFUSION BLD/BLD COMPNT: CPT

## 2020-06-16 PROCEDURE — 85025 COMPLETE CBC W/AUTO DIFF WBC: CPT

## 2020-06-16 PROCEDURE — 700101 HCHG RX REV CODE 250: Performed by: INTERNAL MEDICINE

## 2020-06-16 PROCEDURE — 82728 ASSAY OF FERRITIN: CPT

## 2020-06-16 PROCEDURE — 86923 COMPATIBILITY TEST ELECTRIC: CPT

## 2020-06-16 PROCEDURE — 700102 HCHG RX REV CODE 250 W/ 637 OVERRIDE(OP): Performed by: PHYSICIAN ASSISTANT

## 2020-06-16 PROCEDURE — A9270 NON-COVERED ITEM OR SERVICE: HCPCS | Performed by: HOSPITALIST

## 2020-06-16 PROCEDURE — P9016 RBC LEUKOCYTES REDUCED: HCPCS

## 2020-06-16 PROCEDURE — 80048 BASIC METABOLIC PNL TOTAL CA: CPT

## 2020-06-16 PROCEDURE — 99406 BEHAV CHNG SMOKING 3-10 MIN: CPT

## 2020-06-16 PROCEDURE — 770020 HCHG ROOM/CARE - TELE (206)

## 2020-06-16 PROCEDURE — 700102 HCHG RX REV CODE 250 W/ 637 OVERRIDE(OP): Performed by: HOSPITALIST

## 2020-06-16 PROCEDURE — 83735 ASSAY OF MAGNESIUM: CPT

## 2020-06-16 PROCEDURE — 700105 HCHG RX REV CODE 258

## 2020-06-16 PROCEDURE — A9270 NON-COVERED ITEM OR SERVICE: HCPCS | Performed by: INTERNAL MEDICINE

## 2020-06-16 PROCEDURE — 93306 TTE W/DOPPLER COMPLETE: CPT | Mod: 26 | Performed by: INTERNAL MEDICINE

## 2020-06-16 PROCEDURE — 82962 GLUCOSE BLOOD TEST: CPT | Mod: 91

## 2020-06-16 PROCEDURE — 86850 RBC ANTIBODY SCREEN: CPT

## 2020-06-16 PROCEDURE — 94664 DEMO&/EVAL PT USE INHALER: CPT

## 2020-06-16 PROCEDURE — 83540 ASSAY OF IRON: CPT

## 2020-06-16 PROCEDURE — 84132 ASSAY OF SERUM POTASSIUM: CPT

## 2020-06-16 RX ORDER — FUROSEMIDE 10 MG/ML
20 INJECTION INTRAMUSCULAR; INTRAVENOUS ONCE
Status: DISCONTINUED | OUTPATIENT
Start: 2020-06-16 | End: 2020-06-16

## 2020-06-16 RX ORDER — CALCIUM CHLORIDE 100 MG/ML
1 INJECTION INTRAVENOUS; INTRAVENTRICULAR ONCE
Status: DISCONTINUED | OUTPATIENT
Start: 2020-06-16 | End: 2020-06-16

## 2020-06-16 RX ORDER — HYDRALAZINE HYDROCHLORIDE 20 MG/ML
20 INJECTION INTRAMUSCULAR; INTRAVENOUS EVERY 6 HOURS PRN
Status: DISCONTINUED | OUTPATIENT
Start: 2020-06-16 | End: 2020-06-22 | Stop reason: HOSPADM

## 2020-06-16 RX ORDER — FUROSEMIDE 10 MG/ML
40 INJECTION INTRAMUSCULAR; INTRAVENOUS ONCE
Status: COMPLETED | OUTPATIENT
Start: 2020-06-16 | End: 2020-06-16

## 2020-06-16 RX ORDER — FUROSEMIDE 10 MG/ML
40 INJECTION INTRAMUSCULAR; INTRAVENOUS
Status: DISCONTINUED | OUTPATIENT
Start: 2020-06-16 | End: 2020-06-17

## 2020-06-16 RX ORDER — DEXTROSE MONOHYDRATE 25 G/50ML
50 INJECTION, SOLUTION INTRAVENOUS ONCE
Status: COMPLETED | OUTPATIENT
Start: 2020-06-16 | End: 2020-06-16

## 2020-06-16 RX ORDER — CALCIUM CHLORIDE 100 MG/ML
1 INJECTION INTRAVENOUS; INTRAVENTRICULAR ONCE
Status: COMPLETED | OUTPATIENT
Start: 2020-06-16 | End: 2020-06-16

## 2020-06-16 RX ORDER — AMLODIPINE BESYLATE 5 MG/1
5 TABLET ORAL
Status: DISCONTINUED | OUTPATIENT
Start: 2020-06-16 | End: 2020-06-18

## 2020-06-16 RX ORDER — SODIUM POLYSTYRENE SULFONATE 15 G/60ML
15 SUSPENSION ORAL; RECTAL ONCE
Status: COMPLETED | OUTPATIENT
Start: 2020-06-16 | End: 2020-06-16

## 2020-06-16 RX ORDER — SODIUM CHLORIDE 9 MG/ML
INJECTION, SOLUTION INTRAVENOUS
Status: COMPLETED
Start: 2020-06-16 | End: 2020-06-16

## 2020-06-16 RX ADMIN — IPRATROPIUM BROMIDE AND ALBUTEROL SULFATE 3 ML: .5; 3 SOLUTION RESPIRATORY (INHALATION) at 19:32

## 2020-06-16 RX ADMIN — NICOTINE 14 MG: 14 PATCH TRANSDERMAL at 05:18

## 2020-06-16 RX ADMIN — TRAZODONE HYDROCHLORIDE 50 MG: 50 TABLET ORAL at 05:18

## 2020-06-16 RX ADMIN — PREDNISONE 40 MG: 20 TABLET ORAL at 05:18

## 2020-06-16 RX ADMIN — DOCUSATE SODIUM 50 MG AND SENNOSIDES 8.6 MG 2 TABLET: 8.6; 5 TABLET, FILM COATED ORAL at 17:39

## 2020-06-16 RX ADMIN — SODIUM POLYSTYRENE SULFONATE 15 G: 15 SUSPENSION ORAL; RECTAL at 03:22

## 2020-06-16 RX ADMIN — FUROSEMIDE 40 MG: 10 INJECTION, SOLUTION INTRAMUSCULAR; INTRAVENOUS at 09:05

## 2020-06-16 RX ADMIN — AMITRIPTYLINE HYDROCHLORIDE 100 MG: 50 TABLET, FILM COATED ORAL at 20:03

## 2020-06-16 RX ADMIN — ROSUVASTATIN CALCIUM 10 MG: 20 TABLET, FILM COATED ORAL at 17:41

## 2020-06-16 RX ADMIN — IPRATROPIUM BROMIDE AND ALBUTEROL SULFATE 3 ML: .5; 3 SOLUTION RESPIRATORY (INHALATION) at 09:57

## 2020-06-16 RX ADMIN — LEVOTHYROXINE SODIUM 75 MCG: 0.07 TABLET ORAL at 05:17

## 2020-06-16 RX ADMIN — IPRATROPIUM BROMIDE AND ALBUTEROL SULFATE 3 ML: .5; 3 SOLUTION RESPIRATORY (INHALATION) at 13:50

## 2020-06-16 RX ADMIN — AMLODIPINE BESYLATE 5 MG: 5 TABLET ORAL at 09:05

## 2020-06-16 RX ADMIN — TRAZODONE HYDROCHLORIDE 50 MG: 50 TABLET ORAL at 20:03

## 2020-06-16 RX ADMIN — IPRATROPIUM BROMIDE AND ALBUTEROL SULFATE 3 ML: .5; 3 SOLUTION RESPIRATORY (INHALATION) at 06:31

## 2020-06-16 RX ADMIN — CALCIUM CHLORIDE 1 G: 100 INJECTION INTRAVENOUS; INTRAVENTRICULAR at 02:30

## 2020-06-16 RX ADMIN — SODIUM BICARBONATE 50 MEQ: 84 INJECTION, SOLUTION INTRAVENOUS at 03:22

## 2020-06-16 RX ADMIN — IPRATROPIUM BROMIDE AND ALBUTEROL SULFATE 3 ML: .5; 3 SOLUTION RESPIRATORY (INHALATION) at 23:22

## 2020-06-16 RX ADMIN — DEXTROSE MONOHYDRATE 50 ML: 25 INJECTION, SOLUTION INTRAVENOUS at 02:18

## 2020-06-16 RX ADMIN — INSULIN HUMAN 2 UNITS: 100 INJECTION, SOLUTION PARENTERAL at 12:38

## 2020-06-16 RX ADMIN — INSULIN GLARGINE 20 UNITS: 100 INJECTION, SOLUTION SUBCUTANEOUS at 20:03

## 2020-06-16 RX ADMIN — METOPROLOL TARTRATE 25 MG: 25 TABLET, FILM COATED ORAL at 14:29

## 2020-06-16 RX ADMIN — FUROSEMIDE 40 MG: 10 INJECTION, SOLUTION INTRAMUSCULAR; INTRAVENOUS at 17:41

## 2020-06-16 RX ADMIN — IPRATROPIUM BROMIDE AND ALBUTEROL SULFATE 3 ML: .5; 3 SOLUTION RESPIRATORY (INHALATION) at 02:31

## 2020-06-16 RX ADMIN — FUROSEMIDE 40 MG: 10 INJECTION, SOLUTION INTRAMUSCULAR; INTRAVENOUS at 02:18

## 2020-06-16 RX ADMIN — GUAIFENESIN 1200 MG: 600 TABLET, EXTENDED RELEASE ORAL at 05:17

## 2020-06-16 RX ADMIN — GUAIFENESIN 1200 MG: 600 TABLET, EXTENDED RELEASE ORAL at 17:40

## 2020-06-16 RX ADMIN — SODIUM CHLORIDE 500 ML: 9 INJECTION, SOLUTION INTRAVENOUS at 03:02

## 2020-06-16 RX ADMIN — INSULIN HUMAN 10 UNITS: 100 INJECTION, SOLUTION PARENTERAL at 02:19

## 2020-06-16 ASSESSMENT — ENCOUNTER SYMPTOMS
COUGH: 1
SPUTUM PRODUCTION: 0
SPUTUM PRODUCTION: 1
WHEEZING: 1
NECK PAIN: 0
BLOOD IN STOOL: 0
SHORTNESS OF BREATH: 1
CHILLS: 0
FEVER: 0
NAUSEA: 0
FLANK PAIN: 0
HEADACHES: 0
WHEEZING: 0
BACK PAIN: 0
HEMOPTYSIS: 0
SORE THROAT: 0
BRUISES/BLEEDS EASILY: 0
PALPITATIONS: 0
SEIZURES: 0
MYALGIAS: 0
DIARRHEA: 0
DIZZINESS: 0
ABDOMINAL PAIN: 0
DIAPHORESIS: 0
FOCAL WEAKNESS: 0
LIGHT-HEADEDNESS: 0
DEPRESSION: 0
WEAKNESS: 1
BLURRED VISION: 0
VOMITING: 0

## 2020-06-16 ASSESSMENT — FIBROSIS 4 INDEX: FIB4 SCORE: 1.13

## 2020-06-16 NOTE — PROGRESS NOTES
Claritza from Lab called with critical result of potassium of 6.7 at 2045. Critical lab result read back to Claritza.   Dr. Rosibel Castro notified of critical lab result at 2050.  Critical lab result read back by Dr. Castro.      Dr. Castro ordered calcium gluconate IV, IV insulin and dextrose, and sodium bicarbonate IV.     Lab came to take another BMP before any medication were given, and came back at 6.6. Will get another BMP and monitor Patient.

## 2020-06-16 NOTE — PROGRESS NOTES
Bedside report received from NOC RN  Assumed care of pt. Pt awake, laying in bed. A/Ox4, VSS. No concerns, complaints or distress. Pt educated to call before getting out of bed. POC reviewed and white board updated. Tele box on. SR 72on the monitor. Call light in reach. Bed locked in lowest position with 2 upper bed rails up. Bed alarm on.

## 2020-06-16 NOTE — ASSESSMENT & PLAN NOTE
Has worsening shortness of breath  Troponin I 100.  Likely demand ischemia  Cardiology consulted, f/u outpatient

## 2020-06-16 NOTE — PROGRESS NOTES
Cardiology Follow Up Progress Note    Date of Service  6/16/2020    Attending Physician  Shiraz Beltran M.D.    Chief Complaint   Shortness of breath    HPI  Priya Callahan is a 69 y.o. female admitted 6/15/2020 with shortness of breath, orthopnea, leg swelling over the last 3 weeks. Current medical problems include chronic hypoxia on O2 at home, DM2, HTN, HLD, obesity, current tobacco use, mitral stenosis.     Interim Events  Reports some swelling in her feet, on her baseline O2 requirements, 4L, in bed. She denies orthopnea. Still has productive cough, wheezing.     Review of Systems  Review of Systems   Constitutional: Negative for chills and fever.   HENT: Negative for congestion.    Respiratory: Positive for cough, shortness of breath and wheezing.    Cardiovascular: Positive for leg swelling. Negative for chest pain and palpitations.   Gastrointestinal: Negative for abdominal pain.   Genitourinary: Negative for difficulty urinating.   Neurological: Negative for dizziness and light-headedness.       Vital signs in last 24 hours  Temp:  [36.1 °C (97 °F)-36.9 °C (98.5 °F)] 36.1 °C (97 °F)  Pulse:  [61-91] 79  Resp:  [16-63] 16  BP: ()/() 178/60  SpO2:  [93 %-100 %] 99 %    Physical Exam  Physical Exam  Vitals signs and nursing note reviewed.   Constitutional:       Appearance: She is not ill-appearing or diaphoretic.      Comments: Obese female, BMI 38   HENT:      Head: Normocephalic and atraumatic.   Cardiovascular:      Rate and Rhythm: Normal rate and regular rhythm.      Comments: Difficult to ausculate heart sounds over breath sounds  Pulmonary:      Breath sounds: Wheezing and rhonchi present.   Abdominal:      General: There is no distension.   Musculoskeletal:      Comments: Mild edema in left foot, otherwise no pitting edema   Skin:     General: Skin is warm and dry.      Capillary Refill: Capillary refill takes less than 2 seconds.   Psychiatric:         Judgment: Judgment normal.          Lab Review  Lab Results   Component Value Date/Time    WBC 16.5 (H) 06/16/2020 06:16 AM    RBC 2.67 (L) 06/16/2020 06:16 AM    HEMOGLOBIN 8.2 (L) 06/16/2020 06:16 AM    HEMATOCRIT 26.2 (L) 06/16/2020 06:16 AM    MCV 98.1 (H) 06/16/2020 06:16 AM    MCH 30.7 06/16/2020 06:16 AM    MCHC 31.3 (L) 06/16/2020 06:16 AM    MPV 9.4 06/16/2020 06:16 AM      Lab Results   Component Value Date/Time    SODIUM 135 06/16/2020 04:54 AM    POTASSIUM 6.4 (H) 06/16/2020 04:54 AM    CHLORIDE 99 06/16/2020 04:54 AM    CO2 28 06/16/2020 04:54 AM    GLUCOSE 152 (H) 06/16/2020 04:54 AM    BUN 51 (H) 06/16/2020 04:54 AM    CREATININE 2.17 (H) 06/16/2020 04:54 AM      Lab Results   Component Value Date/Time    ASTSGOT 15 06/16/2020 12:25 AM    ALTSGPT 20 06/16/2020 12:25 AM     Lab Results   Component Value Date/Time    CHOLSTRLTOT 116 08/21/2019 09:41 AM    LDL 60 08/21/2019 09:41 AM    HDL 36 (A) 08/21/2019 09:41 AM    TRIGLYCERIDE 100 08/21/2019 09:41 AM    TROPONINT 100 (H) 06/15/2020 12:24 PM       Recent Labs     06/15/20  1909   NTPROBNP 1387*       Cardiac Imaging and Procedures Review  EKG 6/16/20: Sinus rhythm, normal axis, T wave inversion in inferior leads    Echocardiogram:  6/5/2019  CONCLUSIONS  Normal left ventricular systolic function.  Left ventricular ejection fraction is visually estimated to be 60%.  Grade II diastolic dysfunction.  The right ventricle was normal in size and function.  Moderate mitral stenosis.  Mean transvalvular gradient is 9 mmHg at a heart rate of  BPM.  Mild aortic stenosis.  Compared to the images of the prior study done 3/16/2017 -  there is   now evidence for mild aortic stenosis. The estimate of right   ventricular systolic pressure cannot be made on the current exam,   previously 60 mmHg.     Nuclear stress spect lexiscan 2017   NUCLEAR IMAGING INTERPRETATION    No myocardial infarct or reversible ischemia.    Normal LEFT ventricular function.    ECG INTERPRETATION    Negative stress  ECG for ischemia.       Assessment/Plan  HFpEF  - has some degree of HFpEF and diastolic HF  - recommend continuing lasix, making good urine output this morning with 40mg IV, she will likely need to be discharged with PO diuretics given her diastolic dysfunction  - NTproBNP 1387 on arrival    COPD  - she is wheezing severely on my exam and has productive cough, I have ordered PEP and she would continue duonebs   - recommend CT chest to differentiate her lung disease especially in the setting of hemoptysis and tobacco use, she should be followed by pulmonary medicine as outpatient    HTN, uncontrolled  - given her poor pulmonary function and active wheezing I would avoid nonselective beta blockers if possible  - No ACE/ARB in setting of hyperkalemia  - Start Amlodipine 5mg, this may take several days for full effect on BP    HAN on some degree of CKD (?3b)  - no oliguria  - unclear if this is her baseline, she has seen Dr. Najjar in the past, he labeled her has Hypertensive kidney disease    Hyperkalemia  - may continue IV lasix  - avoid ARB/ACE obviously  - hyperkalemia seems out of proportion to HAN    Moderate Mitral stenosis  - start metoprolol 25mg bid    Staffed with Dr. Morton

## 2020-06-16 NOTE — PROGRESS NOTES
Paged Dr. Beltran regarding patient potassium of 6.4 this morning. Also updated on all of the medications given for previous hyperkalemia of 6.7 and 6.3 overnight.

## 2020-06-16 NOTE — PROGRESS NOTES
Hospital Medicine Daily Progress Note    Date of Service  6/16/2020    Chief Complaint  69 y.o. female admitted 6/15/2020 with SOB.    Hospital Course    PMH COPD, CHF, HTN, chronic home O2, CKD IV, HLD, tobacco use who presented with weakness and cough and admitted for COPD exacerbation. CXR showed mild pulm edema. COVID PCR was negative. Cardiology was consulted for elevated troponin.  She also had hyperkalemia.       Interval Problem Update  Worsening hyperkalemia overnight, given lasix x2 with improvement  Still SOB but feels a little better  No hemoptysis today.    Consultants/Specialty  Cardiology    Code Status  Full Code    Disposition  Lives in [facility]    Review of Systems  Review of Systems   Constitutional: Positive for malaise/fatigue.   HENT: Negative for congestion.    Respiratory: Positive for cough, sputum production, shortness of breath and wheezing. Negative for hemoptysis.    Cardiovascular: Positive for chest pain (pleuritic).   Gastrointestinal: Negative for abdominal pain and vomiting.   Genitourinary: Negative for dysuria.   Musculoskeletal: Negative for myalgias.   Neurological: Positive for weakness. Negative for dizziness.   Psychiatric/Behavioral: Negative for depression.        Physical Exam  Temp:  [36.1 °C (97 °F)-36.9 °C (98.5 °F)] 36.7 °C (98 °F)  Pulse:  [61-91] 72  Resp:  [15-63] 16  BP: ()/() 148/67  SpO2:  [93 %-100 %] 98 %    Physical Exam  Vitals signs and nursing note reviewed.   Constitutional:       Appearance: She is obese. She is ill-appearing. She is not toxic-appearing or diaphoretic.   HENT:      Head: Normocephalic.      Mouth/Throat:      Mouth: Mucous membranes are moist.   Eyes:      General:         Right eye: No discharge.         Left eye: No discharge.   Neck:      Musculoskeletal: Neck supple.   Cardiovascular:      Rate and Rhythm: Normal rate and regular rhythm.   Pulmonary:      Breath sounds: Wheezing present. No rales.      Comments:  tachypneic  Abdominal:      Palpations: Abdomen is soft.      Tenderness: There is no abdominal tenderness.   Musculoskeletal:         General: Swelling (trace) present.   Skin:     General: Skin is warm and dry.   Neurological:      Mental Status: She is alert and oriented to person, place, and time.         Fluids    Intake/Output Summary (Last 24 hours) at 6/16/2020 1517  Last data filed at 6/16/2020 1100  Gross per 24 hour   Intake 800 ml   Output 1250 ml   Net -450 ml       Laboratory  Recent Labs     06/15/20  1224 06/16/20  0025 06/16/20  0616   WBC 11.6* 9.7 16.5*   RBC 2.43* 2.16* 2.67*   HEMOGLOBIN 7.6* 6.7* 8.2*   HEMATOCRIT 25.3* 21.8* 26.2*   .1* 100.9* 98.1*   MCH 31.3 31.0 30.7   MCHC 30.0* 30.7* 31.3*   RDW 49.1 47.3 53.3*   PLATELETCT 227 178 208   MPV 9.1 9.1 9.4     Recent Labs     06/15/20  2057 06/16/20  0025 06/16/20  0454 06/16/20  1148   SODIUM 135 135 135  --    POTASSIUM 6.6* 6.3* 6.4* 5.6*   CHLORIDE 97 100 99  --    CO2 27 27 28  --    GLUCOSE 323* 199* 152*  --    BUN 47* 50* 51*  --    CREATININE 2.40* 2.26* 2.17*  --    CALCIUM 9.6 9.9 10.8*  --                    Imaging  EC-ECHOCARDIOGRAM COMPLETE W/O CONT   Final Result      DX-CHEST-PORTABLE (1 VIEW)   Final Result         Diffuse interstitial prominence could relate to mild pulmonary edema.      Stable cardiomegaly.      CT-CHEST (THORAX) W/O    (Results Pending)        Assessment/Plan  * Chronic obstructive pulmonary disease with acute exacerbation (HCC)- (present on admission)  Assessment & Plan  With exacerbation  Prednisone for 5 days. Oxygen as needed, Respiratory protocol, Bronchodilators, Incentive spirometry    Given hemoptysis, will check CT chest, no contrast because of CKD    Hyperkalemia- (present on admission)  Assessment & Plan  Creatinine fluctuating for a couple years  Hyperkalemic, improved with lasix, continue  Recheck K level today  Renal diet  Telemetry    Elevated troponin  Assessment & Plan  Has  worsening shortness of breath  Troponin I 100.  Likely demand ischemia  Cardiology consulted    ACP (advance care planning)  Assessment & Plan  Full code    CKD (chronic kidney disease), stage IV (HCC)- (present on admission)  Assessment & Plan  Avoid nephrotoxins as much as possible, renally dose medications, monitor inputs and outputs    With hyperkalemia, monitor while on lasix  Renal diet    Mixed anemia- (present on admission)  Assessment & Plan  Near baseline, appears to be chronic disease anemia    DM type 2, uncontrolled, with renal complications (HCC)- (present on admission)  Assessment & Plan  With hyperglycemia  Check A1c  Cont lantus, ISS  Accu-Checks, hypoglycemia protocol     Chronic respiratory failure with hypoxia (HCC)- (present on admission)  Assessment & Plan  Cont O2 support    Non-rheumatic mitral valve stenosis- (present on admission)  Assessment & Plan  Seen on TTE  Cardiology consulted    Acute on chronic diastolic congestive heart failure (HCC)- (present on admission)  Assessment & Plan  Fluid overloaded  Cont IV lasix    Obesity (BMI 30-39.9)- (present on admission)  Assessment & Plan  Body mass index is 36.61 kg/m².  Counseling provided.      Dyslipidemia- (present on admission)  Assessment & Plan  Resume home rosuvastatin    Tobacco dependence- (present on admission)  Assessment & Plan  Received counseling    Essential hypertension- (present on admission)  Assessment & Plan  Uncontrolled  On metop and norvasc and lasix  monitor       VTE prophylaxis: scds

## 2020-06-16 NOTE — ASSESSMENT & PLAN NOTE
On metop and norvasc and lasix  Avoiding ACEI and aldactone because of CKD and hyperkalemia  Had hypertensive urgency overnight with SBP >200. Added hydralazine and improved

## 2020-06-16 NOTE — ASSESSMENT & PLAN NOTE
With exacerbation  Prednisone for 5 days. Oxygen as needed, Respiratory protocol, Bronchodilators, Incentive spirometry    CT chest showed emphysematous changes

## 2020-06-16 NOTE — ASSESSMENT & PLAN NOTE
Creatinine fluctuating for a couple years  Hyperkalemic, improved with lasix, continue  Renal artery US neg for stenosis  Renal diet  Telemetry

## 2020-06-16 NOTE — DISCHARGE PLANNING
Care Transition Team Assessment    RN TY spoke to patient to complete transition assessment.  Patient lives @ [facility] living [facility].  She requires assistance with IADLs and w/ multiple ADLs, including dressing and bathing.  She is on 4 lpm NC O2 through [employer] all the time.  She has a walker and a wheel chair at the [facility].  She gets places via transportation provided by [facility].  She states that the staff will come get her when she is ready to be discharged.  Denies any needs at this time.    DC Plan:  Home w/ no needs    Information Source  Orientation : Oriented x 4  Information Given By: Patient  Informant's Name: Priya Callahan  Who is responsible for making decisions for patient? : Patient    Readmission Evaluation  Is this a readmission?: No    Elopement Risk  Legal Hold: No  Ambulatory or Self Mobile in Wheelchair: Yes  Disoriented: No  Psychiatric Symptoms: None  History of Wandering: No  Elopement this Admit: No  Vocalizing Wanting to Leave: No  Displays Behaviors, Body Language Wanting to Leave: No-Not at Risk for Elopement  Elopement Risk: Not at Risk for Elopement    Interdisciplinary Discharge Planning  Primary Care Physician: Dr. Ramos  Lives with - Patient's Self Care Capacity: Other (Comments)(Lives in an assisted living facility)  Patient or legal guardian wants to designate a caregiver (see row info): No  Support Systems:  / , Home Care Staff  Housing / Facility: Assisted Living Residence  Name of Care Facility: [facility]  Do You Take your Prescribed Medications Regularly: Yes  Able to Return to Previous ADL's: Yes  Mobility Issues: Yes  Prior Services: Continuous (24 Hour) Care Giving Per Service, Intermittent Physical Support for ADL Per Service, Housekeeping / Homemaker Services  Patient Expects to be Discharged to:: Home  Assistance Needed: Yes  Durable Medical Equipment: Home Oxygen, Walker, Other - Specify(wheelchair)  DME Provider /  Phone: [employer] for O2    Discharge Preparedness  What is your plan after discharge?: Home with help  What are your discharge supports?: Other (comment)(shelter staff)  Prior Functional Level: Needs Assist with Activities of Daily Living, Needs Assist with Medication Management, Uses Walker, Uses Wheelchair  Difficulity with ADLs: Bathing, Dressing, Walking  Difficulty with ADLs Comment: staff @ [facility] assists with ADLs  Difficulity with IADLs: Cooking, Driving, Laundry, Managing medication, Shopping  Difficulity with IADL Comments: Staff @ [facility] assists with IADLs    Functional Assesment  Prior Functional Level: Needs Assist with Activities of Daily Living, Needs Assist with Medication Management, Uses Walker, Uses Wheelchair    Finances  Financial Barriers to Discharge: No  Prescription Coverage: Yes    Vision / Hearing Impairment  Vision Impairment : Yes  Right Eye Vision: Impaired, Wears Glasses  Left Eye Vision: Impaired, Wears Glasses  Hearing Impairment : No         Advance Directive  Advance Directive?: DPOA for Health Care  Durable Power of  Name and Contact : Uzair Zavala  [phone]    Domestic Abuse  Have you ever been the victim of abuse or violence?: No  Physical Abuse or Sexual Abuse: No  Verbal Abuse or Emotional Abuse: No  Possible Abuse Reported to:: Not Applicable    Psychological Assessment  History of Substance Abuse: None  History of Psychiatric Problems: Yes  Non-compliant with Treatment: No    Discharge Risks or Barriers  Discharge risks or barriers?: No    Anticipated Discharge Information  Anticipated discharge disposition: Assisted living  Discharge Address: [address] [place] [address] [place] [address]    [place] [id number]  Discharge Contact Phone Number: [phone]

## 2020-06-16 NOTE — PROGRESS NOTES
Handoff report received from day shift nurse. Pt care assumed. Pt is currently resting in bed. POC discussed with Pt and Pt verbalizes no questions at this time. Pt is AAOx4, on 4.5L NC, on Tele monitoring, and VSS. Call light and belongings within reach, bed in lowest and locked position, and Pt educated on use of call light. Will continue to monitor.

## 2020-06-16 NOTE — ASSESSMENT & PLAN NOTE
Avoid nephrotoxins as much as possible, renally dose medications, monitor inputs and outputs    Hyperkalemia improved with lasix  Renal diet  Renal US no artery stenosis

## 2020-06-16 NOTE — PROGRESS NOTES
Paged Dr. Eugene Castro regarding patient having elevated Potassium and patient hgb at 6.7. Dr. Castro ordered 1 unit PRBCs and medications for the hyperkalemia: IV insulin and dextrose, calcium chloride, sodium bicarb, and kayexalate. MD also ordered IV lasix. Will continue to montpippa

## 2020-06-16 NOTE — PROGRESS NOTES
Hospital Medicine Daily Progress Note    Date of Service  6/16/2020    Chief Complaint  69 y.o. female with a past medical history of type 2 diabetes mellitus, chronic anemia, COPD on 4 L of oxygen, CKD stage IV, hypertension, tobacco abuse admitted 6/15/2020 with shortness of breath and generalized weakness    Hospital Course    Interval Problem Update  I was called by the nurse regarding uptrending potassium.  I examined the patient at bedside.  Patient's potassium increased from 5.9->6.7.  Her EKG reveals peaked T waves.  I have started the patient on IV calcium, IV insulin with dextrose, IV bicarbonate, Kayexalate and high-dose albuterol breathing treatment.  I will add a dose of IV Lasix 40 mg.  Will monitor BMP.  Continuous cardiac monitoring    Consultants/Specialty  Cardiology    Code Status  Full code    Disposition  Monitor on telemetry    Review of Systems  Review of Systems   Constitutional: Positive for malaise/fatigue. Negative for chills, diaphoresis and fever.   HENT: Negative for hearing loss and sore throat.    Eyes: Negative for blurred vision.   Respiratory: Positive for cough and shortness of breath. Negative for sputum production and wheezing.    Cardiovascular: Negative for chest pain, palpitations and leg swelling.   Gastrointestinal: Negative for abdominal pain, blood in stool, diarrhea, nausea and vomiting.   Genitourinary: Negative for dysuria, flank pain and urgency.   Musculoskeletal: Negative for back pain, joint pain, myalgias and neck pain.   Skin: Negative for rash.   Neurological: Negative for dizziness, focal weakness, seizures and headaches.   Endo/Heme/Allergies: Does not bruise/bleed easily.   Psychiatric/Behavioral: Negative for suicidal ideas.   All other systems reviewed and are negative.       Physical Exam  Temp:  [36.5 °C (97.7 °F)-36.9 °C (98.5 °F)] 36.5 °C (97.7 °F)  Pulse:  [65-91] 71  Resp:  [16-63] 18  BP: ()/() 120/41  SpO2:  [93 %-99 %] 94 %    Physical  Exam  Vitals signs and nursing note reviewed.   Constitutional:       General: She is not in acute distress.     Appearance: She is obese.   HENT:      Head: Normocephalic and atraumatic.      Nose: Nose normal.      Mouth/Throat:      Mouth: Mucous membranes are moist.   Eyes:      Extraocular Movements: Extraocular movements intact.      Conjunctiva/sclera: Conjunctivae normal.      Pupils: Pupils are equal, round, and reactive to light.   Neck:      Musculoskeletal: Normal range of motion and neck supple.   Cardiovascular:      Rate and Rhythm: Normal rate and regular rhythm.      Pulses: Normal pulses.      Heart sounds: Normal heart sounds.   Pulmonary:      Effort: No respiratory distress.      Breath sounds: Wheezing present. No rhonchi or rales.   Abdominal:      General: Bowel sounds are normal. There is no distension.      Palpations: Abdomen is soft.      Tenderness: There is no abdominal tenderness.   Musculoskeletal:         General: No swelling or tenderness.      Right lower leg: Edema present.      Left lower leg: Edema present.   Lymphadenopathy:      Cervical: No cervical adenopathy.   Skin:     General: Skin is warm.      Coloration: Skin is not jaundiced.      Findings: No rash.   Neurological:      General: No focal deficit present.      Mental Status: She is alert and oriented to person, place, and time.      Cranial Nerves: No cranial nerve deficit.      Motor: No weakness.   Psychiatric:         Mood and Affect: Mood normal.         Behavior: Behavior normal.         Fluids    Intake/Output Summary (Last 24 hours) at 6/16/2020 0204  Last data filed at 6/15/2020 1900  Gross per 24 hour   Intake 500 ml   Output --   Net 500 ml       Laboratory  Recent Labs     06/15/20  1224 06/16/20  0025   WBC 11.6* 9.7   RBC 2.43* 2.16*   HEMOGLOBIN 7.6* 6.7*   HEMATOCRIT 25.3* 21.8*   .1* 100.9*   MCH 31.3 31.0   MCHC 30.0* 30.7*   RDW 49.1 47.3   PLATELETCT 227 178   MPV 9.1 9.1     Recent Labs      06/15/20  1909 06/15/20  2057 06/16/20  0025   SODIUM 135 135 135   POTASSIUM 6.7* 6.6* 6.3*   CHLORIDE 98 97 100   CO2 23 27 27   GLUCOSE 337* 323* 199*   BUN 45* 47* 50*   CREATININE 2.39* 2.40* 2.26*   CALCIUM 9.5 9.6 9.9                   Imaging  EC-ECHOCARDIOGRAM COMPLETE W/O CONT         DX-CHEST-PORTABLE (1 VIEW)   Final Result         Diffuse interstitial prominence could relate to mild pulmonary edema.      Stable cardiomegaly.           Assessment/Plan  * Chronic obstructive pulmonary disease with acute exacerbation (HCC)- (present on admission)  Assessment & Plan  Oxygen as needed, Respiratory protocol, Bronchodilators, Incentive spirometry  With exacerbation  Steroids for 5 days      Hyperkalemia- (present on admission)  Assessment & Plan  Secondary to worsening renal function and ACE inhibitor use  I have started the patient on IV calcium chloride, IV bicarbonate, IV insulin with dextrose, albuterol and Kayexalate  IV Lasix 40 mg daily  Monitor BMP  Continuous cardiac monitoring  Hold lisinopril.      Elevated troponin  Assessment & Plan  Has worsening shortness of breath  Troponin I 100.  EKG shows a sinus rhythm with a rate of 78, no ST elevation, T wave inversion and ST depression in lead I, II, V4-6, QTc 420   Continuous cardiac monitoring    Stat EKG, troponin for chest pain or worsening shortness of breath   Cardiology consulted, will check echocardiography     ACP (advance care planning)  Assessment & Plan  I had a prolonged discussion with the patient and regarding goals of care, diagnoses, prognosis, and CODE STATUS.   We discussed her prognosis and comorbidities.   Patient has advanced age, severe chronic obstructive pulmonary disease, chronic episodic respiratory failure, chronic kidney disease stage IV and poor functional performance comes today with progressive shortness of breath generalized weakness and diffuse pains, and hyperkalemia.  I discussed considering changing code to DNAR/DNI,  I explained hospice/comfort care approach.  At this point patient the patient wants to continue with aggressive therapy and maintain a full code at this point.      CKD (chronic kidney disease), stage IV (HCC)- (present on admission)  Assessment & Plan  Avoid nephrotoxins as much as possible, renally dose medications, monitor inputs and outputs      Mixed anemia- (present on admission)  Assessment & Plan  Her hemoglobin is close to baseline, no evidence of overt bleeding.  Microcytic with raised RDW, likely mixed chronic inflammation, iron deficiency, kidney disease   We will check iron studies, consider erythropoietin if iron studies within normal limits   Transfuse for hemoglobin of less than or equal to 7     DM type 2, uncontrolled, with renal complications (HCC)- (present on admission)  Assessment & Plan  With hyperglycemia  Glycated hemoglobin 6.71-year ago  Long & short acting insulin  Accu-Checks, hypoglycemia protocol     Obesity (BMI 30-39.9)- (present on admission)  Assessment & Plan  Body mass index is 36.61 kg/m².  Counseling provided.    I explained that there is increase in mortality and morbidity associated with excess weight.  There is increased risk of diabetes, hypertension, heart disease, cerebrovascular accidents, sleep apnea and cancer.    I encouraged the patient to lose weight by reducing caloric intake, I encouraged low-carb diet in addition to aim for 120-150 minutes of exercise a week.      Dyslipidemia- (present on admission)  Assessment & Plan  Resume home rosuvastatin    Tobacco dependence- (present on admission)  Assessment & Plan  I spent nearly 4 minutes on Tobacco cessation education and counseling.   I Discussed the benefits of quitting smoking and risks of continued smoking including cardiovascular disease, cancer and COPD, which she already has.   Discussed options of nicotine patch, medical treatment with Wellbutrin [Bupropion] and Chantix [Varenicline].      Essential  hypertension- (present on admission)  Assessment & Plan  Patient appears slightly volume overloaded will start intravenous Lasix.  We will hold home lisinopril for now given her worsening renal function  Vital signs per policy       VTE prophylaxis: SCD    This patient is critically ill with life-threatening hyperkalemia.  I have treated the patient with IV calcium, IV bicarbonate, IV insulin with dextrose, albuterol, IV Lasix and Kayexalate. I have assessed and reassessed her respiratory status, hemodynamics and cardiovascular status.  She is at increased risk for worsening respiratory and cardiovascular system dysfunction.     High risk of deterioration and worsening vital organ dysfunction and death without the above critical care interventions.    Critical Care Time:  35 minutes  No time overlap  Time excludes procedures  Discussed with  RN, Clinical pharmacist

## 2020-06-16 NOTE — RESPIRATORY CARE
COPD navigator note:  Patient is admitted with COPD exacerbation. Patient is established with [facility]. Appointment made for 8/14/2020 @ 1015 with Vito Chopra.

## 2020-06-16 NOTE — RESPIRATORY CARE
"COPD Education provided?  COPD EDUCATION by COPD CLINICAL EDUCATOR  6/16/2020  at  11:28 AM by Idalia Lutz, RRT     Patient unable to participate in full program.  Short intervention has been conducted.  A comprehensive packet including information about COPD, treatments, and oxygen safety was reviewed - as well as MDI spacer technique.    Action Plan Completed/Updated? completed    Pulmonary Function Testing (PFT)? Recommended last year but never completed    Does patient currently use inhalers/nebulizer at home? Angela Batesonejama- nebulizer    Additional medication/equipment recommendations none    Is all Respiratory DME in place? Yes Oxygen; she has a POC also                  If yes, has patient been educated on use of DME?   Yes - safety with -0- smoking    Have all necessary follow up appointments been scheduled?   PCP or D/C clinic Pt states her new Dr is Dr Jimenez in [place] (not [facility] she will be making appt. -called [phone] to confirm   Specialty Contact information for [facility] saw 6/2019 via Tele-med    Has the patient been referred to Pulmonary Rehab? Provided with information lives in [facility]    Has the patient been referred to the [facility] Program? na    Home Health referral placed? TBD  Recommended? Yes-discussed with pt        Smoking Cessation Intervention and education completed, 5 minutes spent on smoking cessation education with patient.  Provided smoking cessation packet with \"Tips to Quit\" and flyer for \"Free Smoking Cessation Classes\".     "

## 2020-06-17 LAB
ANION GAP SERPL CALC-SCNC: 8 MMOL/L (ref 7–16)
BASOPHILS # BLD AUTO: 0.3 % (ref 0–1.8)
BASOPHILS # BLD: 0.05 K/UL (ref 0–0.12)
BUN SERPL-MCNC: 62 MG/DL (ref 8–22)
CALCIUM SERPL-MCNC: 9.6 MG/DL (ref 8.5–10.5)
CHLORIDE SERPL-SCNC: 94 MMOL/L (ref 96–112)
CO2 SERPL-SCNC: 30 MMOL/L (ref 20–33)
CREAT SERPL-MCNC: 2.29 MG/DL (ref 0.5–1.4)
EOSINOPHIL # BLD AUTO: 0.14 K/UL (ref 0–0.51)
EOSINOPHIL NFR BLD: 0.8 % (ref 0–6.9)
ERYTHROCYTE [DISTWIDTH] IN BLOOD BY AUTOMATED COUNT: 54.1 FL (ref 35.9–50)
GLUCOSE BLD-MCNC: 134 MG/DL (ref 65–99)
GLUCOSE BLD-MCNC: 227 MG/DL (ref 65–99)
GLUCOSE BLD-MCNC: 82 MG/DL (ref 65–99)
GLUCOSE SERPL-MCNC: 87 MG/DL (ref 65–99)
HCT VFR BLD AUTO: 24.1 % (ref 37–47)
HEMOCCULT STL QL: NEGATIVE
HGB BLD-MCNC: 7.6 G/DL (ref 12–16)
IMM GRANULOCYTES # BLD AUTO: 0.12 K/UL (ref 0–0.11)
IMM GRANULOCYTES NFR BLD AUTO: 0.7 % (ref 0–0.9)
LYMPHOCYTES # BLD AUTO: 1.7 K/UL (ref 1–4.8)
LYMPHOCYTES NFR BLD: 10.3 % (ref 22–41)
MCH RBC QN AUTO: 30.8 PG (ref 27–33)
MCHC RBC AUTO-ENTMCNC: 31.5 G/DL (ref 33.6–35)
MCV RBC AUTO: 97.6 FL (ref 81.4–97.8)
MONOCYTES # BLD AUTO: 1.03 K/UL (ref 0–0.85)
MONOCYTES NFR BLD AUTO: 6.2 % (ref 0–13.4)
NEUTROPHILS # BLD AUTO: 13.53 K/UL (ref 2–7.15)
NEUTROPHILS NFR BLD: 81.7 % (ref 44–72)
NRBC # BLD AUTO: 0 K/UL
NRBC BLD-RTO: 0 /100 WBC
PLATELET # BLD AUTO: 262 K/UL (ref 164–446)
PMV BLD AUTO: 9.7 FL (ref 9–12.9)
POTASSIUM SERPL-SCNC: 5 MMOL/L (ref 3.6–5.5)
RBC # BLD AUTO: 2.47 M/UL (ref 4.2–5.4)
SODIUM SERPL-SCNC: 132 MMOL/L (ref 135–145)
WBC # BLD AUTO: 16.6 K/UL (ref 4.8–10.8)

## 2020-06-17 PROCEDURE — 770020 HCHG ROOM/CARE - TELE (206)

## 2020-06-17 PROCEDURE — 83036 HEMOGLOBIN GLYCOSYLATED A1C: CPT

## 2020-06-17 PROCEDURE — 700102 HCHG RX REV CODE 250 W/ 637 OVERRIDE(OP): Performed by: PHYSICIAN ASSISTANT

## 2020-06-17 PROCEDURE — 94640 AIRWAY INHALATION TREATMENT: CPT

## 2020-06-17 PROCEDURE — 700111 HCHG RX REV CODE 636 W/ 250 OVERRIDE (IP): Performed by: INTERNAL MEDICINE

## 2020-06-17 PROCEDURE — 94760 N-INVAS EAR/PLS OXIMETRY 1: CPT

## 2020-06-17 PROCEDURE — 80048 BASIC METABOLIC PNL TOTAL CA: CPT

## 2020-06-17 PROCEDURE — 94669 MECHANICAL CHEST WALL OSCILL: CPT

## 2020-06-17 PROCEDURE — 36415 COLL VENOUS BLD VENIPUNCTURE: CPT

## 2020-06-17 PROCEDURE — 82272 OCCULT BLD FECES 1-3 TESTS: CPT

## 2020-06-17 PROCEDURE — 99232 SBSQ HOSP IP/OBS MODERATE 35: CPT | Performed by: INTERNAL MEDICINE

## 2020-06-17 PROCEDURE — A9270 NON-COVERED ITEM OR SERVICE: HCPCS | Performed by: PHYSICIAN ASSISTANT

## 2020-06-17 PROCEDURE — 700101 HCHG RX REV CODE 250: Performed by: INTERNAL MEDICINE

## 2020-06-17 PROCEDURE — 82962 GLUCOSE BLOOD TEST: CPT | Mod: 91

## 2020-06-17 PROCEDURE — 700101 HCHG RX REV CODE 250: Performed by: HOSPITALIST

## 2020-06-17 PROCEDURE — 700111 HCHG RX REV CODE 636 W/ 250 OVERRIDE (IP): Performed by: HOSPITALIST

## 2020-06-17 PROCEDURE — 85025 COMPLETE CBC W/AUTO DIFF WBC: CPT

## 2020-06-17 PROCEDURE — 99233 SBSQ HOSP IP/OBS HIGH 50: CPT | Performed by: INTERNAL MEDICINE

## 2020-06-17 PROCEDURE — 97161 PT EVAL LOW COMPLEX 20 MIN: CPT

## 2020-06-17 PROCEDURE — A9270 NON-COVERED ITEM OR SERVICE: HCPCS | Performed by: HOSPITALIST

## 2020-06-17 PROCEDURE — 700102 HCHG RX REV CODE 250 W/ 637 OVERRIDE(OP): Performed by: HOSPITALIST

## 2020-06-17 RX ORDER — IPRATROPIUM BROMIDE AND ALBUTEROL SULFATE 2.5; .5 MG/3ML; MG/3ML
3 SOLUTION RESPIRATORY (INHALATION)
Status: DISCONTINUED | OUTPATIENT
Start: 2020-06-17 | End: 2020-06-19 | Stop reason: ALTCHOICE

## 2020-06-17 RX ORDER — FUROSEMIDE 40 MG/1
40 TABLET ORAL
Status: DISCONTINUED | OUTPATIENT
Start: 2020-06-18 | End: 2020-06-22 | Stop reason: HOSPADM

## 2020-06-17 RX ORDER — FUROSEMIDE 40 MG/1
40 TABLET ORAL
Status: DISCONTINUED | OUTPATIENT
Start: 2020-06-18 | End: 2020-06-17

## 2020-06-17 RX ORDER — IPRATROPIUM BROMIDE AND ALBUTEROL SULFATE 2.5; .5 MG/3ML; MG/3ML
3 SOLUTION RESPIRATORY (INHALATION)
Status: DISCONTINUED | OUTPATIENT
Start: 2020-06-17 | End: 2020-06-22 | Stop reason: HOSPADM

## 2020-06-17 RX ADMIN — ROSUVASTATIN CALCIUM 10 MG: 20 TABLET, FILM COATED ORAL at 16:52

## 2020-06-17 RX ADMIN — METOPROLOL TARTRATE 25 MG: 25 TABLET, FILM COATED ORAL at 06:22

## 2020-06-17 RX ADMIN — IPRATROPIUM BROMIDE AND ALBUTEROL SULFATE 3 ML: .5; 3 SOLUTION RESPIRATORY (INHALATION) at 18:45

## 2020-06-17 RX ADMIN — INSULIN GLARGINE 20 UNITS: 100 INJECTION, SOLUTION SUBCUTANEOUS at 20:26

## 2020-06-17 RX ADMIN — PREDNISONE 40 MG: 20 TABLET ORAL at 06:23

## 2020-06-17 RX ADMIN — INSULIN HUMAN 3 UNITS: 100 INJECTION, SOLUTION PARENTERAL at 11:58

## 2020-06-17 RX ADMIN — TRAZODONE HYDROCHLORIDE 50 MG: 50 TABLET ORAL at 06:23

## 2020-06-17 RX ADMIN — AMITRIPTYLINE HYDROCHLORIDE 100 MG: 50 TABLET, FILM COATED ORAL at 20:25

## 2020-06-17 RX ADMIN — AMLODIPINE BESYLATE 5 MG: 5 TABLET ORAL at 06:22

## 2020-06-17 RX ADMIN — GUAIFENESIN 1200 MG: 600 TABLET, EXTENDED RELEASE ORAL at 06:22

## 2020-06-17 RX ADMIN — NICOTINE 14 MG: 14 PATCH TRANSDERMAL at 06:23

## 2020-06-17 RX ADMIN — DOCUSATE SODIUM 50 MG AND SENNOSIDES 8.6 MG 2 TABLET: 8.6; 5 TABLET, FILM COATED ORAL at 06:22

## 2020-06-17 RX ADMIN — INSULIN HUMAN 2 UNITS: 100 INJECTION, SOLUTION PARENTERAL at 20:26

## 2020-06-17 RX ADMIN — TRAZODONE HYDROCHLORIDE 50 MG: 50 TABLET ORAL at 20:25

## 2020-06-17 RX ADMIN — FUROSEMIDE 40 MG: 10 INJECTION, SOLUTION INTRAMUSCULAR; INTRAVENOUS at 06:23

## 2020-06-17 RX ADMIN — LEVOTHYROXINE SODIUM 75 MCG: 0.07 TABLET ORAL at 06:23

## 2020-06-17 RX ADMIN — DOCUSATE SODIUM 50 MG AND SENNOSIDES 8.6 MG 2 TABLET: 8.6; 5 TABLET, FILM COATED ORAL at 16:52

## 2020-06-17 RX ADMIN — IPRATROPIUM BROMIDE AND ALBUTEROL SULFATE 3 ML: .5; 3 SOLUTION RESPIRATORY (INHALATION) at 14:41

## 2020-06-17 RX ADMIN — OXYCODONE HYDROCHLORIDE 10 MG: 10 TABLET ORAL at 16:07

## 2020-06-17 RX ADMIN — METOPROLOL TARTRATE 25 MG: 25 TABLET, FILM COATED ORAL at 16:53

## 2020-06-17 RX ADMIN — GUAIFENESIN 1200 MG: 600 TABLET, EXTENDED RELEASE ORAL at 16:52

## 2020-06-17 RX ADMIN — IPRATROPIUM BROMIDE AND ALBUTEROL SULFATE 3 ML: .5; 3 SOLUTION RESPIRATORY (INHALATION) at 07:09

## 2020-06-17 RX ADMIN — OXYCODONE HYDROCHLORIDE 10 MG: 10 TABLET ORAL at 20:25

## 2020-06-17 ASSESSMENT — GAIT ASSESSMENTS
GAIT LEVEL OF ASSIST: SUPERVISED
DISTANCE (FEET): 75
ASSISTIVE DEVICE: FRONT WHEEL WALKER
DEVIATION: BRADYKINETIC

## 2020-06-17 ASSESSMENT — COGNITIVE AND FUNCTIONAL STATUS - GENERAL
TURNING FROM BACK TO SIDE WHILE IN FLAT BAD: A LITTLE
MOBILITY SCORE: 22
CLIMB 3 TO 5 STEPS WITH RAILING: A LITTLE
SUGGESTED CMS G CODE MODIFIER MOBILITY: CJ

## 2020-06-17 ASSESSMENT — ENCOUNTER SYMPTOMS
SPUTUM PRODUCTION: 1
LIGHT-HEADEDNESS: 0
HEMOPTYSIS: 0
VOMITING: 0
COUGH: 1
PALPITATIONS: 0
DEPRESSION: 0
FEVER: 0
DIZZINESS: 0
CHILLS: 0
WHEEZING: 1
WEAKNESS: 1
CHEST TIGHTNESS: 0
ABDOMINAL PAIN: 0
ABDOMINAL DISTENTION: 0
SHORTNESS OF BREATH: 1
MYALGIAS: 0

## 2020-06-17 ASSESSMENT — FIBROSIS 4 INDEX: FIB4 SCORE: 0.9

## 2020-06-17 NOTE — THERAPY
Physical Therapy   Initial Evaluation     Patient Name: Priya Callahan  Age:  69 y.o., Sex:  female  Medical Record #: [id number]  Today's Date: 6/17/2020     Precautions: Fall Risk    Assessment    Pt admitted w/ SOB.  Hx of COPD.  She lives in an assisted living facility where she is mobile in her apartment w/ a fww, but when leaving her residence she utilizes an electric w/c.  She reports sleeping in her recliner due to report of chronic back pain.  Today, she is mobilizing essentially at her PLOF, w/ the exception of increased shortness of breath.  She is able to self pace.  Recommend oob/amb prn w/ nsg and fww.  Anticipate she will perform well at her assisted living facility upon d/c.  No acute PT needs.  PT will be available for d/c needs only    Plan    Recommend Physical Therapy for Evaluation only     Discharge recommendations:  Recommend home health transitional care for continued physical therapy services.          06/17/20 1332   Prior Living Situation   Housing / Facility Assisted Living Residence   Steps Into Home 0   Steps In Home 0   Equipment Owned Power Wheel Chair;Front-Wheel Walker   Prior Level of Functional Mobility   Comments Pt sleeps in a recliner   Gait Analysis   Gait Level Of Assist Supervised   Assistive Device Front Wheel Walker   Distance (Feet) 75   Deviation Bradykinetic   Bed Mobility    Supine to Sit Supervised   Sit to Supine Supervised   Scooting Supervised   Functional Mobility   Sit to Stand Supervised   Toilet Transfers Supervised   Anticipated Discharge Equipment   DC Equipment None

## 2020-06-17 NOTE — PROGRESS NOTES
"Cardiology Follow Up Progress Note    Date of Service  6/17/2020    Attending Physician  Shiraz Beltran M.D.    Chief Complaint   Shortness of breath    HPI  Priya Callahan is a 69 y.o. female admitted 6/15/2020 with shortness of breath, orthopnea, leg swelling over the last 3 weeks. Current medical problems include chronic hypoxia on O2 at home, DM2, HTN, HLD, obesity, current tobacco use, mitral stenosis.     Interim Events  O2 needs at baseline, still has a productive cough, denies orthopnea, leg swelling, chest pressure or \"heaviness\".     Reports having hyperkalemia with lisinopril before.     Review of Systems  Review of Systems   Constitutional: Negative for chills and fever.   Respiratory: Positive for cough, shortness of breath (baseline) and wheezing. Negative for chest tightness.    Cardiovascular: Negative for chest pain, palpitations and leg swelling.   Gastrointestinal: Negative for abdominal distention.   Genitourinary: Negative for difficulty urinating.   Neurological: Negative for dizziness and light-headedness.       Vital signs in last 24 hours  Temp:  [35.9 °C (96.6 °F)-36.9 °C (98.5 °F)] 36.1 °C (97 °F)  Pulse:  [68-84] 72  Resp:  [15-20] 18  BP: (110-150)/(57-95) 150/95  SpO2:  [92 %-99 %] 95 %    Physical Exam  Performed and unchanged from exam 6/16/20 except for no pitting edema bilaterally  Physical Exam  Vitals signs and nursing note reviewed.   Constitutional:       Appearance: She is not ill-appearing or diaphoretic.      Comments: Obese female, BMI 38   HENT:      Head: Normocephalic and atraumatic.   Cardiovascular:      Rate and Rhythm: Normal rate and regular rhythm.      Comments: Difficult to ausculate heart sounds over breath sounds  Pulmonary:      Breath sounds: Wheezing and rhonchi present.   Abdominal:      General: There is no distension.   Musculoskeletal:      Comments: No pitting edema bilaterally   Skin:     General: Skin is warm and dry.      Capillary Refill: " Capillary refill takes less than 2 seconds.   Psychiatric:         Judgment: Judgment normal.         Lab Review  Lab Results   Component Value Date/Time    WBC 16.6 (H) 06/17/2020 04:34 AM    RBC 2.47 (L) 06/17/2020 04:34 AM    HEMOGLOBIN 7.6 (L) 06/17/2020 04:34 AM    HEMATOCRIT 24.1 (L) 06/17/2020 04:34 AM    MCV 97.6 06/17/2020 04:34 AM    MCH 30.8 06/17/2020 04:34 AM    MCHC 31.5 (L) 06/17/2020 04:34 AM    MPV 9.7 06/17/2020 04:34 AM      Lab Results   Component Value Date/Time    SODIUM 132 (L) 06/17/2020 04:34 AM    POTASSIUM 5.0 06/17/2020 04:34 AM    CHLORIDE 94 (L) 06/17/2020 04:34 AM    CO2 30 06/17/2020 04:34 AM    GLUCOSE 87 06/17/2020 04:34 AM    BUN 62 (H) 06/17/2020 04:34 AM    CREATININE 2.29 (H) 06/17/2020 04:34 AM      Lab Results   Component Value Date/Time    ASTSGOT 15 06/16/2020 12:25 AM    ALTSGPT 20 06/16/2020 12:25 AM     Lab Results   Component Value Date/Time    CHOLSTRLTOT 116 08/21/2019 09:41 AM    LDL 60 08/21/2019 09:41 AM    HDL 36 (A) 08/21/2019 09:41 AM    TRIGLYCERIDE 100 08/21/2019 09:41 AM    TROPONINT 100 (H) 06/15/2020 12:24 PM       Recent Labs     06/15/20  1909   NTPROBNP 1387*       Cardiac Imaging and Procedures Review  EKG 6/16/20: Sinus rhythm, normal axis, T wave inversion in inferior leads    Echocardiogram:  6/5/2019  CONCLUSIONS  Normal left ventricular systolic function.  Left ventricular ejection fraction is visually estimated to be 60%.  Grade II diastolic dysfunction.  The right ventricle was normal in size and function.  Moderate mitral stenosis.  Mean transvalvular gradient is 9 mmHg at a heart rate of  BPM.  Mild aortic stenosis.  Compared to the images of the prior study done 3/16/2017 -  there is   now evidence for mild aortic stenosis. The estimate of right   ventricular systolic pressure cannot be made on the current exam,   previously 60 mmHg.    TTE 6/15/20  Prior echo done on 06/05/2019. Compared to the report of the study done   - the measured  gradient of mitral stenosis is less.   Normal left ventricular systolic function.  Left ventricular ejection fraction is visually estimated to be 65%.  Moderate mitral stenosis.  Mean gradient is 6 mmHg.  Mild mitral regurgitation.  Mild aortic stenosis.  Vmax is 2.46 m/s. Transvalvular gradients are - Peak: 24 mmHg, Mean: 16 mmHg.     Nuclear stress spect lexiscan 2017   NUCLEAR IMAGING INTERPRETATION    No myocardial infarct or reversible ischemia.    Normal LEFT ventricular function.    ECG INTERPRETATION    Negative stress ECG for ischemia.       Assessment/Plan  HFpEF   Diastolic dysfunction  Mitral Stenosis with dilated LA  - no decompensation this admission, likely COPD exacerbation compromised her fluid balance  - recommend continuing lasix, making good urine output this morning with 40mg IV, she will likely need to be discharged with PO diuretics given her diastolic dysfunction  - NTproBNP 1387 on arrival  - continue metoprolol 25mg bid    COPD exacerbation  - continues to have rhonchi, wheezing on exam and has productive cough  - should get PFTs as outpatient, she does have appt with Pulm Med in August    HTN, better controlled  - given her poor pulmonary function and active wheezing I would avoid nonselective beta blockers if possible  - No ACE/ARB in setting of hyperkalemia  - Amlodipine 5mg, if BPs >130/80 today, increase to 10mg daily    HAN on some degree of CKD (?3b)  - no oliguria  - BUN rising today, will transition her to PO lasix tomorrow  - ? Bilateral Renal artery stenosis as she has had recurrent hyperkalemia/HAN with lisinopril in the past. Consider renal doppler U/S, had renal U/S last year but no doppler  - unclear if this is her baseline, she has seen Dr. Najjar in the past, he labeled her has Hypertensive kidney disease    Hyperkalemia, resolving  - avoid ARB/ACE obviously      Staffed with Dr. Morton. Due to insurance issues patient needs to be seen by [facility] or [facility], I  will place referral out.     Future Appointments   Date Time Provider Department Center   8/14/2020 10:15 AM Vito Chopra M.D. PULM None

## 2020-06-17 NOTE — PROGRESS NOTES
Bedside report received from NOC RN  Assumed care of pt. Pt awake, laying in bed. A/Ox4, VSS. No concerns, complaints or distress. Pt educated to call before getting out of bed. POC reviewed and white board updated. Tele box on. SR72 on the monitor. Call light in reach. Bed locked in lowest position with 2 upper bed rails up. Bed alarm on.

## 2020-06-17 NOTE — CARE PLAN
Problem: Communication  Goal: The ability to communicate needs accurately and effectively will improve  Outcome: PROGRESSING AS EXPECTED     Problem: Safety  Goal: Will remain free from injury  Outcome: PROGRESSING AS EXPECTED  Goal: Will remain free from falls  Outcome: PROGRESSING AS EXPECTED     Problem: Respiratory:  Goal: Respiratory status will improve  Outcome: PROGRESSING SLOWER THAN EXPECTED   Patient is still on 4L NC and getting regular breathing treatments. Patient is still having shortness of breath upon ambulation.

## 2020-06-17 NOTE — PROGRESS NOTES
Handoff report received from day shift nurse. Pt care assumed. Pt is currently resting in bed. POC discussed with Pt and Pt verbalizes no questions at this time. Pt is AAOx4, on 4L nc, on Tele monitoring, and VSS. Call light and belongings within reach, bed in lowest and locked position, and Pt educated on use of call light. Will continue to monitor.

## 2020-06-17 NOTE — PROGRESS NOTES
Hospital Medicine Daily Progress Note    Date of Service  6/17/2020    Chief Complaint  69 y.o. female admitted 6/15/2020 with SOB.    Hospital Course    PMH COPD, CHF, HTN, chronic home O2, CKD IV, HLD, tobacco use who presented with weakness and cough and admitted for COPD exacerbation. CXR showed mild pulm edema. COVID PCR was negative. Cardiology was consulted for elevated troponin.  She also had hyperkalemia and pulm edema that improved with lasix. TTE showed EF 65%, moderate mitral stenosis, mild aortic stenosis. .       Interval Problem Update  She still feels SOB and fatigued when getting up  WBC 16  Hgb 7.6. No signs of bleeding but will check stool for occult blood    Consultants/Specialty  Cardiology    Code Status  Full Code    [facility]  [facility] [place] [facility]    Review of Systems  Review of Systems   Constitutional: Positive for malaise/fatigue.   HENT: Negative for congestion.    Respiratory: Positive for cough, sputum production, shortness of breath and wheezing. Negative for hemoptysis.    Cardiovascular: Positive for chest pain (pleuritic).   Gastrointestinal: Negative for abdominal pain and vomiting.   Genitourinary: Negative for dysuria.   Musculoskeletal: Negative for myalgias.   Neurological: Positive for weakness. Negative for dizziness.   Psychiatric/Behavioral: Negative for depression.        Physical Exam  Temp:  [35.9 °C (96.6 °F)-36.9 °C (98.5 °F)] 36.4 °C (97.6 °F)  Pulse:  [57-77] 63  Resp:  [18-20] 18  BP: (110-150)/(44-95) 136/57  SpO2:  [92 %-99 %] 97 %    Physical Exam  Vitals signs and nursing note reviewed.   Constitutional:       Appearance: She is obese. She is not toxic-appearing or diaphoretic.      Comments: fatigued   HENT:      Head: Normocephalic.      Mouth/Throat:      Mouth: Mucous membranes are moist.   Eyes:      General:         Right eye: No discharge.         Left eye: No discharge.   Neck:      Musculoskeletal: Neck supple.   Cardiovascular:       Rate and Rhythm: Normal rate and regular rhythm.   Pulmonary:      Breath sounds: Rales present. No wheezing.      Comments: tachypneic  Abdominal:      Palpations: Abdomen is soft.      Tenderness: There is no abdominal tenderness.   Musculoskeletal:         General: Swelling (trace) present.   Skin:     General: Skin is warm and dry.   Neurological:      Mental Status: She is alert and oriented to person, place, and time.         Fluids    Intake/Output Summary (Last 24 hours) at 6/17/2020 1444  Last data filed at 6/17/2020 1300  Gross per 24 hour   Intake 360 ml   Output 250 ml   Net 110 ml       Laboratory  Recent Labs     06/16/20  0025 06/16/20  0616 06/17/20  0434   WBC 9.7 16.5* 16.6*   RBC 2.16* 2.67* 2.47*   HEMOGLOBIN 6.7* 8.2* 7.6*   HEMATOCRIT 21.8* 26.2* 24.1*   .9* 98.1* 97.6   MCH 31.0 30.7 30.8   MCHC 30.7* 31.3* 31.5*   RDW 47.3 53.3* 54.1*   PLATELETCT 178 208 262   MPV 9.1 9.4 9.7     Recent Labs     06/16/20  0025 06/16/20  0454 06/16/20  1148 06/16/20  1554 06/17/20  0434   SODIUM 135 135  --   --  132*   POTASSIUM 6.3* 6.4* 5.6* 5.9* 5.0   CHLORIDE 100 99  --   --  94*   CO2 27 28  --   --  30   GLUCOSE 199* 152*  --   --  87   BUN 50* 51*  --   --  62*   CREATININE 2.26* 2.17*  --   --  2.29*   CALCIUM 9.9 10.8*  --   --  9.6                   Imaging  CT-CHEST (THORAX) W/O   Final Result         1. No acute abnormality in the chest.   2. Mild subcarinal lymphadenopathy, most likely reactive. It has improved since prior study.   3. Early emphysema.         EC-ECHOCARDIOGRAM COMPLETE W/O CONT   Final Result      DX-CHEST-PORTABLE (1 VIEW)   Final Result         Diffuse interstitial prominence could relate to mild pulmonary edema.      Stable cardiomegaly.      US-RENAL ARTERY DUPLEX COMP    (Results Pending)        Assessment/Plan  * Chronic obstructive pulmonary disease with acute exacerbation (HCC)- (present on admission)  Assessment & Plan  With exacerbation  Prednisone for 5 days.  Oxygen as needed, Respiratory protocol, Bronchodilators, Incentive spirometry    CT chest showed emphysematous changes    Hyperkalemia- (present on admission)  Assessment & Plan  Creatinine fluctuating for a couple years  Hyperkalemic, improved with lasix, continue  Renal artery US to assess for stenosis  Renal diet  Telemetry    Elevated troponin  Assessment & Plan  Has worsening shortness of breath  Troponin I 100.  Likely demand ischemia  Cardiology consulted    ACP (advance care planning)  Assessment & Plan  Full code    CKD (chronic kidney disease), stage IV (HCC)- (present on admission)  Assessment & Plan  Avoid nephrotoxins as much as possible, renally dose medications, monitor inputs and outputs    Hyperkalemia improved with lasix  Renal diet    Mixed anemia- (present on admission)  Assessment & Plan  Hgb decreased, Check stool for occult blood    DM type 2, uncontrolled, with renal complications (HCC)- (present on admission)  Assessment & Plan  With hyperglycemia  Check A1c  Cont lantus, ISS  Accu-Checks, hypoglycemia protocol     Chronic respiratory failure with hypoxia (HCC)- (present on admission)  Assessment & Plan  Cont O2 support    Non-rheumatic mitral valve stenosis- (present on admission)  Assessment & Plan  Seen on TTE  Cardiology consulted    Acute on chronic diastolic congestive heart failure (HCC)- (present on admission)  Assessment & Plan  Fluid overloaded  Continue lasix, transitioning to oral    Obesity (BMI 30-39.9)- (present on admission)  Assessment & Plan  Body mass index is 36.61 kg/m².  Counseling provided.      Dyslipidemia- (present on admission)  Assessment & Plan  Resume home rosuvastatin    Tobacco dependence- (present on admission)  Assessment & Plan  Received counseling    Essential hypertension- (present on admission)  Assessment & Plan  Uncontrolled, now improving  On metop and norvasc and lasix  monitor       VTE prophylaxis: scds

## 2020-06-18 ENCOUNTER — PATIENT OUTREACH (OUTPATIENT)
Dept: HEALTH INFORMATION MANAGEMENT | Facility: OTHER | Age: 70
End: 2020-06-18

## 2020-06-18 ENCOUNTER — APPOINTMENT (OUTPATIENT)
Dept: RADIOLOGY | Facility: MEDICAL CENTER | Age: 70
DRG: 190 | End: 2020-06-18
Attending: PHYSICIAN ASSISTANT
Payer: MEDICARE

## 2020-06-18 LAB
ANION GAP SERPL CALC-SCNC: 10 MMOL/L (ref 7–16)
BASOPHILS # BLD AUTO: 0.2 % (ref 0–1.8)
BASOPHILS # BLD: 0.03 K/UL (ref 0–0.12)
BUN SERPL-MCNC: 65 MG/DL (ref 8–22)
CALCIUM SERPL-MCNC: 9.2 MG/DL (ref 8.5–10.5)
CHLORIDE SERPL-SCNC: 95 MMOL/L (ref 96–112)
CO2 SERPL-SCNC: 28 MMOL/L (ref 20–33)
CREAT SERPL-MCNC: 1.95 MG/DL (ref 0.5–1.4)
EOSINOPHIL # BLD AUTO: 0.08 K/UL (ref 0–0.51)
EOSINOPHIL NFR BLD: 0.7 % (ref 0–6.9)
ERYTHROCYTE [DISTWIDTH] IN BLOOD BY AUTOMATED COUNT: 54.8 FL (ref 35.9–50)
EST. AVERAGE GLUCOSE BLD GHB EST-MCNC: 131 MG/DL
GLUCOSE BLD-MCNC: 115 MG/DL (ref 65–99)
GLUCOSE BLD-MCNC: 138 MG/DL (ref 65–99)
GLUCOSE BLD-MCNC: 175 MG/DL (ref 65–99)
GLUCOSE BLD-MCNC: 263 MG/DL (ref 65–99)
GLUCOSE BLD-MCNC: 93 MG/DL (ref 65–99)
GLUCOSE SERPL-MCNC: 80 MG/DL (ref 65–99)
HBA1C MFR BLD: 6.2 % (ref 0–5.6)
HCT VFR BLD AUTO: 27.7 % (ref 37–47)
HGB BLD-MCNC: 8.3 G/DL (ref 12–16)
IMM GRANULOCYTES # BLD AUTO: 0.05 K/UL (ref 0–0.11)
IMM GRANULOCYTES NFR BLD AUTO: 0.4 % (ref 0–0.9)
LYMPHOCYTES # BLD AUTO: 1.44 K/UL (ref 1–4.8)
LYMPHOCYTES NFR BLD: 11.9 % (ref 22–41)
MCH RBC QN AUTO: 30.6 PG (ref 27–33)
MCHC RBC AUTO-ENTMCNC: 30 G/DL (ref 33.6–35)
MCV RBC AUTO: 102.2 FL (ref 81.4–97.8)
MONOCYTES # BLD AUTO: 0.87 K/UL (ref 0–0.85)
MONOCYTES NFR BLD AUTO: 7.2 % (ref 0–13.4)
NEUTROPHILS # BLD AUTO: 9.6 K/UL (ref 2–7.15)
NEUTROPHILS NFR BLD: 79.6 % (ref 44–72)
NRBC # BLD AUTO: 0 K/UL
NRBC BLD-RTO: 0 /100 WBC
NT-PROBNP SERPL IA-MCNC: 4454 PG/ML (ref 0–125)
PLATELET # BLD AUTO: 243 K/UL (ref 164–446)
PMV BLD AUTO: 9.6 FL (ref 9–12.9)
POTASSIUM SERPL-SCNC: 4.9 MMOL/L (ref 3.6–5.5)
RBC # BLD AUTO: 2.71 M/UL (ref 4.2–5.4)
SODIUM SERPL-SCNC: 133 MMOL/L (ref 135–145)
WBC # BLD AUTO: 12.1 K/UL (ref 4.8–10.8)

## 2020-06-18 PROCEDURE — A9270 NON-COVERED ITEM OR SERVICE: HCPCS | Performed by: HOSPITALIST

## 2020-06-18 PROCEDURE — 83880 ASSAY OF NATRIURETIC PEPTIDE: CPT

## 2020-06-18 PROCEDURE — 85025 COMPLETE CBC W/AUTO DIFF WBC: CPT

## 2020-06-18 PROCEDURE — 700111 HCHG RX REV CODE 636 W/ 250 OVERRIDE (IP): Performed by: HOSPITALIST

## 2020-06-18 PROCEDURE — 82962 GLUCOSE BLOOD TEST: CPT | Mod: 91

## 2020-06-18 PROCEDURE — 700102 HCHG RX REV CODE 250 W/ 637 OVERRIDE(OP): Performed by: PHYSICIAN ASSISTANT

## 2020-06-18 PROCEDURE — 94760 N-INVAS EAR/PLS OXIMETRY 1: CPT

## 2020-06-18 PROCEDURE — A9270 NON-COVERED ITEM OR SERVICE: HCPCS | Performed by: INTERNAL MEDICINE

## 2020-06-18 PROCEDURE — 99232 SBSQ HOSP IP/OBS MODERATE 35: CPT | Performed by: INTERNAL MEDICINE

## 2020-06-18 PROCEDURE — 700102 HCHG RX REV CODE 250 W/ 637 OVERRIDE(OP): Performed by: INTERNAL MEDICINE

## 2020-06-18 PROCEDURE — 770020 HCHG ROOM/CARE - TELE (206)

## 2020-06-18 PROCEDURE — 700102 HCHG RX REV CODE 250 W/ 637 OVERRIDE(OP): Performed by: HOSPITALIST

## 2020-06-18 PROCEDURE — 700101 HCHG RX REV CODE 250: Performed by: INTERNAL MEDICINE

## 2020-06-18 PROCEDURE — 93975 VASCULAR STUDY: CPT

## 2020-06-18 PROCEDURE — 94640 AIRWAY INHALATION TREATMENT: CPT

## 2020-06-18 PROCEDURE — A9270 NON-COVERED ITEM OR SERVICE: HCPCS | Performed by: PHYSICIAN ASSISTANT

## 2020-06-18 PROCEDURE — 94669 MECHANICAL CHEST WALL OSCILL: CPT

## 2020-06-18 PROCEDURE — 93975 VASCULAR STUDY: CPT | Mod: 26 | Performed by: INTERNAL MEDICINE

## 2020-06-18 PROCEDURE — 80048 BASIC METABOLIC PNL TOTAL CA: CPT

## 2020-06-18 PROCEDURE — 36415 COLL VENOUS BLD VENIPUNCTURE: CPT

## 2020-06-18 RX ORDER — SPIRONOLACTONE 25 MG/1
25 TABLET ORAL
Status: DISCONTINUED | OUTPATIENT
Start: 2020-06-18 | End: 2020-06-18

## 2020-06-18 RX ORDER — AMLODIPINE BESYLATE 10 MG/1
10 TABLET ORAL
Status: DISCONTINUED | OUTPATIENT
Start: 2020-06-19 | End: 2020-06-22 | Stop reason: HOSPADM

## 2020-06-18 RX ORDER — SPIRONOLACTONE 25 MG/1
12.5 TABLET ORAL
Status: DISCONTINUED | OUTPATIENT
Start: 2020-06-18 | End: 2020-06-18

## 2020-06-18 RX ORDER — AMLODIPINE BESYLATE 5 MG/1
5 TABLET ORAL ONCE
Status: COMPLETED | OUTPATIENT
Start: 2020-06-18 | End: 2020-06-18

## 2020-06-18 RX ADMIN — IPRATROPIUM BROMIDE AND ALBUTEROL SULFATE 3 ML: .5; 3 SOLUTION RESPIRATORY (INHALATION) at 19:11

## 2020-06-18 RX ADMIN — AMLODIPINE BESYLATE 5 MG: 5 TABLET ORAL at 12:20

## 2020-06-18 RX ADMIN — TRAZODONE HYDROCHLORIDE 50 MG: 50 TABLET ORAL at 20:57

## 2020-06-18 RX ADMIN — INSULIN HUMAN 5 UNITS: 100 INJECTION, SOLUTION PARENTERAL at 12:11

## 2020-06-18 RX ADMIN — DOCUSATE SODIUM 50 MG AND SENNOSIDES 8.6 MG 2 TABLET: 8.6; 5 TABLET, FILM COATED ORAL at 06:15

## 2020-06-18 RX ADMIN — AMITRIPTYLINE HYDROCHLORIDE 100 MG: 50 TABLET, FILM COATED ORAL at 20:56

## 2020-06-18 RX ADMIN — TRAZODONE HYDROCHLORIDE 50 MG: 50 TABLET ORAL at 06:15

## 2020-06-18 RX ADMIN — IPRATROPIUM BROMIDE AND ALBUTEROL SULFATE 3 ML: .5; 3 SOLUTION RESPIRATORY (INHALATION) at 06:59

## 2020-06-18 RX ADMIN — INSULIN GLARGINE 20 UNITS: 100 INJECTION, SOLUTION SUBCUTANEOUS at 20:54

## 2020-06-18 RX ADMIN — AMLODIPINE BESYLATE 5 MG: 5 TABLET ORAL at 06:14

## 2020-06-18 RX ADMIN — ROSUVASTATIN CALCIUM 10 MG: 20 TABLET, FILM COATED ORAL at 17:24

## 2020-06-18 RX ADMIN — GUAIFENESIN 1200 MG: 600 TABLET, EXTENDED RELEASE ORAL at 06:14

## 2020-06-18 RX ADMIN — NICOTINE 14 MG: 14 PATCH TRANSDERMAL at 06:17

## 2020-06-18 RX ADMIN — GUAIFENESIN 1200 MG: 600 TABLET, EXTENDED RELEASE ORAL at 17:23

## 2020-06-18 RX ADMIN — LEVOTHYROXINE SODIUM 75 MCG: 0.07 TABLET ORAL at 06:15

## 2020-06-18 RX ADMIN — FUROSEMIDE 40 MG: 40 TABLET ORAL at 06:14

## 2020-06-18 RX ADMIN — INSULIN HUMAN 2 UNITS: 100 INJECTION, SOLUTION PARENTERAL at 17:28

## 2020-06-18 RX ADMIN — OXYCODONE HYDROCHLORIDE 10 MG: 10 TABLET ORAL at 15:13

## 2020-06-18 RX ADMIN — METOPROLOL TARTRATE 25 MG: 25 TABLET, FILM COATED ORAL at 06:15

## 2020-06-18 RX ADMIN — METOPROLOL TARTRATE 25 MG: 25 TABLET, FILM COATED ORAL at 17:23

## 2020-06-18 RX ADMIN — OXYCODONE HYDROCHLORIDE 10 MG: 10 TABLET ORAL at 21:21

## 2020-06-18 RX ADMIN — PREDNISONE 40 MG: 20 TABLET ORAL at 06:15

## 2020-06-18 RX ADMIN — IPRATROPIUM BROMIDE AND ALBUTEROL SULFATE 3 ML: .5; 3 SOLUTION RESPIRATORY (INHALATION) at 10:45

## 2020-06-18 ASSESSMENT — ENCOUNTER SYMPTOMS
ABDOMINAL PAIN: 0
APNEA: 0
COUGH: 1
WHEEZING: 1
STRIDOR: 0
DIZZINESS: 0
SHORTNESS OF BREATH: 0
CHEST TIGHTNESS: 0
SHORTNESS OF BREATH: 1
SPUTUM PRODUCTION: 0
WEAKNESS: 1
MYALGIAS: 0
DEPRESSION: 0
CHOKING: 0
COUGH: 0
WHEEZING: 0
HEMOPTYSIS: 0
VOMITING: 0

## 2020-06-18 ASSESSMENT — FIBROSIS 4 INDEX: FIB4 SCORE: 0.97

## 2020-06-18 NOTE — PROGRESS NOTES
Hospital Medicine Daily Progress Note    Date of Service  6/18/2020    Chief Complaint  69 y.o. female admitted 6/15/2020 with SOB.    Hospital Course    PMH COPD, CHF, HTN, chronic home O2, CKD IV, HLD, tobacco use who presented with weakness and cough and admitted for COPD exacerbation. CXR showed mild pulm edema. COVID PCR was negative. Cardiology was consulted for elevated troponin.  She also had hyperkalemia and pulm edema that improved with lasix. TTE showed EF 65%, moderate mitral stenosis, mild aortic stenosis. Her cardiac medications were optimized.       Interval Problem Update  She feels less SOB, was able to walk a little farther. Mild cough.     Consultants/Specialty  Cardiology    Code Status  Full Code    Disposition  Lives in [facility]    Review of Systems  Review of Systems   Constitutional: Positive for malaise/fatigue.   HENT: Negative for congestion.    Respiratory: Positive for cough and shortness of breath. Negative for hemoptysis, sputum production and wheezing.    Cardiovascular: Positive for chest pain (pleuritic).   Gastrointestinal: Negative for abdominal pain and vomiting.   Genitourinary: Negative for dysuria.   Musculoskeletal: Negative for myalgias.   Neurological: Positive for weakness. Negative for dizziness.   Psychiatric/Behavioral: Negative for depression.        Physical Exam  Temp:  [36.2 °C (97.2 °F)-36.8 °C (98.3 °F)] 36.2 °C (97.2 °F)  Pulse:  [54-72] 65  Resp:  [16-20] 18  BP: (101-168)/(57-71) 101/71  SpO2:  [91 %-98 %] 96 %    Physical Exam  Vitals signs and nursing note reviewed.   Constitutional:       Appearance: She is obese. She is not toxic-appearing or diaphoretic.      Comments: fatigued   HENT:      Head: Normocephalic.      Mouth/Throat:      Mouth: Mucous membranes are moist.   Eyes:      General:         Right eye: No discharge.         Left eye: No discharge.   Neck:      Musculoskeletal: Neck supple.   Cardiovascular:      Rate and Rhythm:  Normal rate and regular rhythm.   Pulmonary:      Breath sounds: Rales present. No wheezing.      Comments: tachypneic  Abdominal:      Palpations: Abdomen is soft.      Tenderness: There is no abdominal tenderness.   Musculoskeletal:         General: Swelling (trace) present.   Skin:     General: Skin is warm and dry.   Neurological:      Mental Status: She is alert and oriented to person, place, and time.   Psychiatric:         Mood and Affect: Mood normal.         Behavior: Behavior normal.         Fluids    Intake/Output Summary (Last 24 hours) at 6/18/2020 1124  Last data filed at 6/18/2020 1000  Gross per 24 hour   Intake 360 ml   Output 1750 ml   Net -1390 ml       Laboratory  Recent Labs     06/16/20  0616 06/17/20  0434 06/18/20  0331   WBC 16.5* 16.6* 12.1*   RBC 2.67* 2.47* 2.71*   HEMOGLOBIN 8.2* 7.6* 8.3*   HEMATOCRIT 26.2* 24.1* 27.7*   MCV 98.1* 97.6 102.2*   MCH 30.7 30.8 30.6   MCHC 31.3* 31.5* 30.0*   RDW 53.3* 54.1* 54.8*   PLATELETCT 208 262 243   MPV 9.4 9.7 9.6     Recent Labs     06/16/20  0454  06/16/20  1554 06/17/20  0434 06/18/20  0331   SODIUM 135  --   --  132* 133*   POTASSIUM 6.4*   < > 5.9* 5.0 4.9   CHLORIDE 99  --   --  94* 95*   CO2 28  --   --  30 28   GLUCOSE 152*  --   --  87 80   BUN 51*  --   --  62* 65*   CREATININE 2.17*  --   --  2.29* 1.95*   CALCIUM 10.8*  --   --  9.6 9.2    < > = values in this interval not displayed.                   Imaging  CT-CHEST (THORAX) W/O   Final Result         1. No acute abnormality in the chest.   2. Mild subcarinal lymphadenopathy, most likely reactive. It has improved since prior study.   3. Early emphysema.         EC-ECHOCARDIOGRAM COMPLETE W/O CONT   Final Result      DX-CHEST-PORTABLE (1 VIEW)   Final Result         Diffuse interstitial prominence could relate to mild pulmonary edema.      Stable cardiomegaly.      US-RENAL ARTERY DUPLEX COMP    (Results Pending)        Assessment/Plan  * Chronic obstructive pulmonary disease with  acute exacerbation (HCC)- (present on admission)  Assessment & Plan  With exacerbation  Prednisone for 5 days. Oxygen as needed, Respiratory protocol, Bronchodilators, Incentive spirometry    CT chest showed emphysematous changes    Hyperkalemia- (present on admission)  Assessment & Plan  Creatinine fluctuating for a couple years  Hyperkalemic, improved with lasix, continue  Renal artery US to assess for stenosis pending  Renal diet  Telemetry    Elevated troponin  Assessment & Plan  Has worsening shortness of breath  Troponin I 100.  Likely demand ischemia  Cardiology consulted    ACP (advance care planning)  Assessment & Plan  Full code    CKD (chronic kidney disease), stage IV (HCC)- (present on admission)  Assessment & Plan  Avoid nephrotoxins as much as possible, renally dose medications, monitor inputs and outputs    Hyperkalemia improved with lasix  Renal diet  Renal US pending    Mixed anemia- (present on admission)  Assessment & Plan  Hgb decreased, Check stool for occult blood    DM type 2, uncontrolled, with renal complications (HCC)- (present on admission)  Assessment & Plan  With hyperglycemia, improved  A1c 6.7%  Cont lantus, ISS  Accu-Checks, hypoglycemia protocol     Chronic respiratory failure with hypoxia (HCC)- (present on admission)  Assessment & Plan  Cont O2 support    Non-rheumatic mitral valve stenosis- (present on admission)  Assessment & Plan  Seen on TTE  Cardiology consulted    Acute on chronic diastolic congestive heart failure (HCC)- (present on admission)  Assessment & Plan  Fluid overloaded  Continue lasix, transitioning to oral    Obesity (BMI 30-39.9)- (present on admission)  Assessment & Plan  Body mass index is 36.61 kg/m².  Counseling provided.      Dyslipidemia- (present on admission)  Assessment & Plan  Resume home rosuvastatin    Tobacco dependence- (present on admission)  Assessment & Plan  Received counseling    Essential hypertension- (present on admission)  Assessment &  Plan  Uncontrolled, now improving  On metop and norvasc and lasix  monitor       VTE prophylaxis: scds

## 2020-06-18 NOTE — PROGRESS NOTES
Bedside report received from NOC RN . Assumed care of pt. Pt awake, laying in bed. A/Ox4, VSS. No concerns, complaints or distress. Pt educated to call before getting out of bed. POC reviewed and white board updated. Tele box on.  SR 67on the monitor. Call light in reach. Bed locked in lowest position with 2 upper bed rails up. Bed alarm on.

## 2020-06-18 NOTE — HEART FAILURE PROGRAM
Cardiovascular Nurse Navigator () Advanced Heart Failure Program HF New Diagnosis Consult Note:     69 year old female with significant history of COPD (two prior intubations) who presented on 6/15/20 with generalized CP and SOB that started after smoking a cigarette two hours prior to presentation. Cardiology consult note indicates that patient actually had symptoms for the past three weeks but delayed seeking medical attention because her assisted living facility would have put her on lockdown for two weeks in her studio if she were admitted to the hospital.    Patient has been diagnosed with HFpEF by cardiology. She additionally has DM II, HTN, HLD, obesity, and mitral stenosis. ECG shows sinus rhythm. Dr. Castro addressed smoking cessation in H&P.    Patient lives in [place], and carries a Medicare HMO plan. She was seen at the HF clinic last summer.     · HFpEF (65%)  · NYHA: IV  · Stage: not being addressed by providers    Diabetes plus HF Specific Interventions:  • Patient is prescribed diabetes treatment at discharge in the form of glycemic control (diet or anti-hyperglycemic medication) or a f/u appointment for diabetes management is scheduled at discharge - needs to appear on AVS: lantus and regular in house  • Prescribed lipid lowering medication at discharge: rosuvastatin 10mg. This is okay since there is not an active ACS diagnosis.    GD Secondary Prevention Interventions:    Daily Weights: ordered    I's and O's: ordered    HFrEF Specific Device Therapy Screening Tool not indicated for new diagnosis    Source: Sensitive Object- SCA Prevention Program Screening Tool  2013 ACC/ AHA Heart Failure Guidelines  Rev date: 12/2014    Advanced Care Planning:   Patient has a POLST on file for DNR and Selective treatment. She is currently a full code but Dr. Castro impeccably documented discussion with patient about her current illness, comorbid conditions, and code status and she told him that she wished to be a  "full code. So, her POLST should definitely be revisited on this admission so that her wishes follow her in to the acute care setting.    Follow up appointment:   • If discharged from acute care to home (exception hospice discharge), pt must have an appointment scheduled within 7 days of discharge (Cardiology, PCP, or DC Clinic).    • If discharged to Transitional Care Facility (LTAC, SNF, IRH), appointment should be made about a month out for after TCF.     Bedside Nursing Education:  Please provide HF booklet and repeated, ongoing education while administering medications, weighing patient, discussing management of symptoms, diet and need to follow up and act on changes. Please target education to the precipitant of the exacerbation.    Bedside Nursing Discharge:  When completing the after visit summary (discharge instructions) please select \"Cardiac Diagnosis, and Heart Failure\" in the special instructions section to populate the heart failure specific discharge instructions.     Referrals/Orders Placed:    Hospital Schedulers for HF f/u?  yes  Social Work   No following  Registered Dietician  yes  REMSA CP Program for patients with Medicaid, [facility], or [facility] coverage?  no  Outpatient Care Coordination for patients with Medicaid?  no    Many thanks, Porsche, Cardiovascular Nurse Navigator, RN, [facility] [phone], & [employer] M-F (excluding holidays).          "

## 2020-06-18 NOTE — PROGRESS NOTES
Cardiology Follow Up Progress Note    Date of Service  6/18/2020    Attending Physician  Shiraz Beltran M.D.    Chief Complaint   Worsening dyspnea, generalized weakness, orthopnea, leg swelling cough x3 days    Was noticed to have hyperkalemia in the ER was given bicarb, insulin and dextrose      Cardiology consultation for HFpEF, diastolic dysfunction, moderate mitral stenosis    HPI  Priya Callahan is a 69 y.o. female admitted 6/15/2020 with dyspnea.      Past medical history significant for type 2 diabetes, chronic anemia, COPD(prior intubations), chronic hypoxemic respiratory failure on 4 days of oxygen, chronic kidney disease stage IV, hypertension, tobacco use, dyslipidemia, mitral stenosis, lives in assisted living, iron deficiency anemia        Blood pressure 168/68  Rhythm sinus        Labs reviewed  H/H  8.3/27.7  Sodium 133  Potassium 4.9  Creatinine 1.95    Interim Events  6/18/20 no overnight cardiac events, SR, showered this am , dyspnea better, no LE edema, diuresed 1500 overnight.    Review of Systems  Review of Systems   Respiratory: Positive for wheezing. Negative for apnea, cough, choking, chest tightness, shortness of breath and stridor.    Cardiovascular: Negative for chest pain and leg swelling.       Vital signs in last 24 hours  Temp:  [36.2 °C (97.2 °F)-36.8 °C (98.3 °F)] 36.4 °C (97.6 °F)  Pulse:  [62-72] 67  Resp:  [16-20] 20  BP: (132-168)/(57-71) 168/68  SpO2:  [91 %-98 %] 95 %    Physical Exam  Physical Exam  Cardiovascular:      Rate and Rhythm: Normal rate and regular rhythm.      Heart sounds: Murmur present.   Pulmonary:      Breath sounds: Wheezing present.   Skin:     General: Skin is warm.   Neurological:      General: No focal deficit present.      Mental Status: She is alert.   Psychiatric:         Mood and Affect: Mood normal.         Lab Review  Lab Results   Component Value Date/Time    WBC 12.1 (H) 06/18/2020 03:31 AM    RBC 2.71 (L) 06/18/2020 03:31 AM     HEMOGLOBIN 8.3 (L) 06/18/2020 03:31 AM    HEMATOCRIT 27.7 (L) 06/18/2020 03:31 AM    .2 (H) 06/18/2020 03:31 AM    MCH 30.6 06/18/2020 03:31 AM    MCHC 30.0 (L) 06/18/2020 03:31 AM    MPV 9.6 06/18/2020 03:31 AM      Lab Results   Component Value Date/Time    SODIUM 133 (L) 06/18/2020 03:31 AM    POTASSIUM 4.9 06/18/2020 03:31 AM    CHLORIDE 95 (L) 06/18/2020 03:31 AM    CO2 28 06/18/2020 03:31 AM    GLUCOSE 80 06/18/2020 03:31 AM    BUN 65 (H) 06/18/2020 03:31 AM    CREATININE 1.95 (H) 06/18/2020 03:31 AM      Lab Results   Component Value Date/Time    ASTSGOT 15 06/16/2020 12:25 AM    ALTSGPT 20 06/16/2020 12:25 AM     Lab Results   Component Value Date/Time    CHOLSTRLTOT 116 08/21/2019 09:41 AM    LDL 60 08/21/2019 09:41 AM    HDL 36 (A) 08/21/2019 09:41 AM    TRIGLYCERIDE 100 08/21/2019 09:41 AM    TROPONINT 100 (H) 06/15/2020 12:24 PM       Recent Labs     06/15/20  1909 06/18/20  0331   NTPROBNP 1387* 4454*       Cardiac Imaging and Procedures Review  EKG:  SR    Echocardiogram:  6/15/20     Left ventricular ejection fraction is visually estimated to be 65%.  Moderate mitral stenosis.  Mean gradient is 6 mmHg.  Mild mitral regurgitation.  Mild aortic stenosis.  Vmax is 2.46 m/s. Transvalvular gradients are - Peak: 24 mmHg, Mean: 16   mmHg.      Cardiac Catheterization:      Imaging  Chest X-Ray:  Diffuse interstitial prominence could relate to mild pulmonary edema.     Stable cardiomegaly    Stress Test:     Nuclear stress spect lexiscan 2017   NUCLEAR IMAGING INTERPRETATION    No myocardial infarct or reversible ischemia.    Normal LEFT ventricular function.    ECG INTERPRETATION    Negative stress ECG for ischemia.          Assessment/    HFpEF , NYHA class III, stage B/C  Mitral stenosis, moderate, will consider ROLANDO as outpatient   COPD (history of 2 prior intubations), on home oxygen  Tobacco dependence  Type 2 diabetes, A1C 6.7 in 2019  Dyslipidemia, Crestor 10 mg  Obesity, BMI 38,  TLC  Hyperkalemia on admission ( K 6.7 ), 4.9 today  Chronic kidney disease stage IV  Hypertension    Plan  OMT for hypertension ( sub optimal this am )  Continue with amlodipine 10 mg  No ACE/RB in setting of hyperkalemia  Continue with furosemide 40 mg daily HFpEF  Currently on metoprolol 25 mg twice daily, will avoid nonselective beta-blockers in the setting of wheezing, COPD.  Cautious use of spironolactone secondary to recurrent hyperkalemia, CKD stage IV.  Renal Doppler ultrasound pending.  Due to insurance issues patient unable to follow-up with [facility] cardiology, discussed with  to arrange for follow-up with [place]'s cardiology or patient with chronic kidney disease stage [facility].            Please contact me with any questions.    KAYLENE Bunn.   Cardiologist, [facility]  [phone]

## 2020-06-18 NOTE — PROGRESS NOTES
Handoff report received from day shift nurse. Pt care assumed. Pt is currently resting in bed. POC discussed with Pt andPt verbalizes no questions at this time. Pt is AAOx4, on 4L NC, on Tele monitoring, and VSS. Call light and belongings within reach, bed in lowest and locked position, and Pt educated on use of call light. Will continue to monitor.

## 2020-06-18 NOTE — CARE PLAN
Problem: Bronchoconstriction:  Goal: Improve in air movement and diminished wheezing  Outcome: PROGRESSING AS EXPECTED       Respiratory Update    Treatment modality: SVN  Frequency: QID    Pt tolerating current treatments well with no adverse reactions.

## 2020-06-19 LAB
ANION GAP SERPL CALC-SCNC: 14 MMOL/L (ref 7–16)
BASOPHILS # BLD AUTO: 0.4 % (ref 0–1.8)
BASOPHILS # BLD: 0.05 K/UL (ref 0–0.12)
BUN SERPL-MCNC: 65 MG/DL (ref 8–22)
CALCIUM SERPL-MCNC: 8.8 MG/DL (ref 8.5–10.5)
CHLORIDE SERPL-SCNC: 101 MMOL/L (ref 96–112)
CO2 SERPL-SCNC: 25 MMOL/L (ref 20–33)
CREAT SERPL-MCNC: 1.95 MG/DL (ref 0.5–1.4)
EOSINOPHIL # BLD AUTO: 0.18 K/UL (ref 0–0.51)
EOSINOPHIL NFR BLD: 1.4 % (ref 0–6.9)
ERYTHROCYTE [DISTWIDTH] IN BLOOD BY AUTOMATED COUNT: 51.7 FL (ref 35.9–50)
GLUCOSE BLD-MCNC: 152 MG/DL (ref 65–99)
GLUCOSE BLD-MCNC: 217 MG/DL (ref 65–99)
GLUCOSE SERPL-MCNC: 93 MG/DL (ref 65–99)
HCT VFR BLD AUTO: 27.9 % (ref 37–47)
HGB BLD-MCNC: 8.6 G/DL (ref 12–16)
IMM GRANULOCYTES # BLD AUTO: 0.25 K/UL (ref 0–0.11)
IMM GRANULOCYTES NFR BLD AUTO: 1.9 % (ref 0–0.9)
LYMPHOCYTES # BLD AUTO: 1.99 K/UL (ref 1–4.8)
LYMPHOCYTES NFR BLD: 15.4 % (ref 22–41)
MCH RBC QN AUTO: 30.7 PG (ref 27–33)
MCHC RBC AUTO-ENTMCNC: 30.8 G/DL (ref 33.6–35)
MCV RBC AUTO: 99.6 FL (ref 81.4–97.8)
MONOCYTES # BLD AUTO: 1.07 K/UL (ref 0–0.85)
MONOCYTES NFR BLD AUTO: 8.3 % (ref 0–13.4)
NEUTROPHILS # BLD AUTO: 9.4 K/UL (ref 2–7.15)
NEUTROPHILS NFR BLD: 72.6 % (ref 44–72)
NRBC # BLD AUTO: 0 K/UL
NRBC BLD-RTO: 0 /100 WBC
PLATELET # BLD AUTO: 236 K/UL (ref 164–446)
PMV BLD AUTO: 9.4 FL (ref 9–12.9)
POTASSIUM SERPL-SCNC: 4.4 MMOL/L (ref 3.6–5.5)
RBC # BLD AUTO: 2.8 M/UL (ref 4.2–5.4)
SODIUM SERPL-SCNC: 140 MMOL/L (ref 135–145)
WBC # BLD AUTO: 12.9 K/UL (ref 4.8–10.8)

## 2020-06-19 PROCEDURE — 700102 HCHG RX REV CODE 250 W/ 637 OVERRIDE(OP): Performed by: PHYSICIAN ASSISTANT

## 2020-06-19 PROCEDURE — 99232 SBSQ HOSP IP/OBS MODERATE 35: CPT | Performed by: INTERNAL MEDICINE

## 2020-06-19 PROCEDURE — 82962 GLUCOSE BLOOD TEST: CPT | Mod: 91

## 2020-06-19 PROCEDURE — A9270 NON-COVERED ITEM OR SERVICE: HCPCS | Performed by: HOSPITALIST

## 2020-06-19 PROCEDURE — 700111 HCHG RX REV CODE 636 W/ 250 OVERRIDE (IP): Performed by: HOSPITALIST

## 2020-06-19 PROCEDURE — 700101 HCHG RX REV CODE 250: Performed by: INTERNAL MEDICINE

## 2020-06-19 PROCEDURE — 770020 HCHG ROOM/CARE - TELE (206)

## 2020-06-19 PROCEDURE — A9270 NON-COVERED ITEM OR SERVICE: HCPCS | Performed by: PHYSICIAN ASSISTANT

## 2020-06-19 PROCEDURE — 94640 AIRWAY INHALATION TREATMENT: CPT

## 2020-06-19 PROCEDURE — 36415 COLL VENOUS BLD VENIPUNCTURE: CPT

## 2020-06-19 PROCEDURE — 94760 N-INVAS EAR/PLS OXIMETRY 1: CPT

## 2020-06-19 PROCEDURE — 700111 HCHG RX REV CODE 636 W/ 250 OVERRIDE (IP): Performed by: PHYSICIAN ASSISTANT

## 2020-06-19 PROCEDURE — 700102 HCHG RX REV CODE 250 W/ 637 OVERRIDE(OP): Performed by: INTERNAL MEDICINE

## 2020-06-19 PROCEDURE — 700102 HCHG RX REV CODE 250 W/ 637 OVERRIDE(OP): Performed by: HOSPITALIST

## 2020-06-19 PROCEDURE — 85025 COMPLETE CBC W/AUTO DIFF WBC: CPT

## 2020-06-19 PROCEDURE — 97165 OT EVAL LOW COMPLEX 30 MIN: CPT

## 2020-06-19 PROCEDURE — 80048 BASIC METABOLIC PNL TOTAL CA: CPT

## 2020-06-19 PROCEDURE — 94669 MECHANICAL CHEST WALL OSCILL: CPT

## 2020-06-19 PROCEDURE — A9270 NON-COVERED ITEM OR SERVICE: HCPCS | Performed by: INTERNAL MEDICINE

## 2020-06-19 RX ORDER — CLONIDINE HYDROCHLORIDE 0.1 MG/1
0.1 TABLET ORAL ONCE
Status: COMPLETED | OUTPATIENT
Start: 2020-06-20 | End: 2020-06-20

## 2020-06-19 RX ORDER — AMLODIPINE BESYLATE 5 MG/1
5 TABLET ORAL
Status: DISCONTINUED | OUTPATIENT
Start: 2020-06-20 | End: 2020-06-20

## 2020-06-19 RX ADMIN — INSULIN GLARGINE 20 UNITS: 100 INJECTION, SOLUTION SUBCUTANEOUS at 22:29

## 2020-06-19 RX ADMIN — GUAIFENESIN 1200 MG: 600 TABLET, EXTENDED RELEASE ORAL at 17:29

## 2020-06-19 RX ADMIN — GUAIFENESIN 1200 MG: 600 TABLET, EXTENDED RELEASE ORAL at 05:20

## 2020-06-19 RX ADMIN — AMITRIPTYLINE HYDROCHLORIDE 100 MG: 50 TABLET, FILM COATED ORAL at 22:23

## 2020-06-19 RX ADMIN — LEVOTHYROXINE SODIUM 75 MCG: 0.07 TABLET ORAL at 05:22

## 2020-06-19 RX ADMIN — ROSUVASTATIN CALCIUM 10 MG: 20 TABLET, FILM COATED ORAL at 22:24

## 2020-06-19 RX ADMIN — INSULIN HUMAN 3 UNITS: 100 INJECTION, SOLUTION PARENTERAL at 16:51

## 2020-06-19 RX ADMIN — OXYCODONE HYDROCHLORIDE 10 MG: 10 TABLET ORAL at 15:34

## 2020-06-19 RX ADMIN — IPRATROPIUM BROMIDE AND ALBUTEROL SULFATE 3 ML: .5; 3 SOLUTION RESPIRATORY (INHALATION) at 06:32

## 2020-06-19 RX ADMIN — HYDRALAZINE HYDROCHLORIDE 20 MG: 20 INJECTION INTRAMUSCULAR; INTRAVENOUS at 19:36

## 2020-06-19 RX ADMIN — METOPROLOL TARTRATE 25 MG: 25 TABLET, FILM COATED ORAL at 05:21

## 2020-06-19 RX ADMIN — TRAZODONE HYDROCHLORIDE 50 MG: 50 TABLET ORAL at 05:21

## 2020-06-19 RX ADMIN — PREDNISONE 40 MG: 20 TABLET ORAL at 11:19

## 2020-06-19 RX ADMIN — DOCUSATE SODIUM 50 MG AND SENNOSIDES 8.6 MG 2 TABLET: 8.6; 5 TABLET, FILM COATED ORAL at 05:22

## 2020-06-19 RX ADMIN — TRAZODONE HYDROCHLORIDE 50 MG: 50 TABLET ORAL at 22:24

## 2020-06-19 RX ADMIN — FUROSEMIDE 40 MG: 40 TABLET ORAL at 05:22

## 2020-06-19 RX ADMIN — OXYCODONE HYDROCHLORIDE 10 MG: 10 TABLET ORAL at 04:03

## 2020-06-19 RX ADMIN — INSULIN HUMAN 2 UNITS: 100 INJECTION, SOLUTION PARENTERAL at 22:28

## 2020-06-19 RX ADMIN — OXYCODONE HYDROCHLORIDE 10 MG: 10 TABLET ORAL at 22:24

## 2020-06-19 RX ADMIN — METOPROLOL TARTRATE 25 MG: 25 TABLET, FILM COATED ORAL at 17:29

## 2020-06-19 RX ADMIN — AMLODIPINE BESYLATE 10 MG: 10 TABLET ORAL at 05:22

## 2020-06-19 RX ADMIN — NICOTINE 14 MG: 14 PATCH TRANSDERMAL at 05:24

## 2020-06-19 ASSESSMENT — ENCOUNTER SYMPTOMS
ABDOMINAL PAIN: 0
DEPRESSION: 0
HEMOPTYSIS: 0
VOMITING: 0
WEAKNESS: 1
MYALGIAS: 0
WHEEZING: 0
DIZZINESS: 0
SPUTUM PRODUCTION: 0
COUGH: 1
SHORTNESS OF BREATH: 1

## 2020-06-19 ASSESSMENT — COGNITIVE AND FUNCTIONAL STATUS - GENERAL
SUGGESTED CMS G CODE MODIFIER DAILY ACTIVITY: CI
DAILY ACTIVITIY SCORE: 23
HELP NEEDED FOR BATHING: A LITTLE

## 2020-06-19 ASSESSMENT — ACTIVITIES OF DAILY LIVING (ADL): TOILETING: INDEPENDENT

## 2020-06-19 ASSESSMENT — FIBROSIS 4 INDEX: FIB4 SCORE: 0.99

## 2020-06-19 NOTE — PROGRESS NOTES
Hospital Medicine Daily Progress Note    Date of Service  6/19/2020    Chief Complaint  69 y.o. female admitted 6/15/2020 with SOB.    Hospital Course    PMH COPD, CHF, HTN, chronic home O2, CKD IV, HLD, tobacco use who presented with weakness and cough and admitted for COPD exacerbation. CXR showed mild pulm edema. COVID PCR was negative. Cardiology was consulted for elevated troponin.  She also had hyperkalemia and pulm edema that improved with lasix. TTE showed EF 65%, moderate mitral stenosis, mild aortic stenosis. Her cardiac medications were optimized, but will remain off of lisinopril. Renal artery US did not show stenosis. Her HTN had improved control will need further medication management as outpatient. Cardiology recommends outpatient follow up, will need a referral from her PCP.       Interval Problem Update  SOB is improved, she is ambulating closer to her baseline    Consultants/Specialty  Cardiology    Code Status  Full Code    Disposition  Lives in [place] at [facility]  Ordered     Review of Systems  Review of Systems   Constitutional: Negative for malaise/fatigue.   HENT: Negative for congestion.    Respiratory: Positive for cough (improved) and shortness of breath (improved). Negative for hemoptysis, sputum production and wheezing.    Cardiovascular: Negative for chest pain.   Gastrointestinal: Negative for abdominal pain and vomiting.   Genitourinary: Negative for dysuria.   Musculoskeletal: Negative for myalgias.   Neurological: Positive for weakness (improved). Negative for dizziness.   Psychiatric/Behavioral: Negative for depression.        Physical Exam  Temp:  [36.1 °C (97 °F)-36.8 °C (98.2 °F)] 36.4 °C (97.6 °F)  Pulse:  [55-84] 55  Resp:  [17-20] 18  BP: (108-164)/(53-84) 143/53  SpO2:  [90 %-98 %] 96 %    Physical Exam  Vitals signs and nursing note reviewed.   Constitutional:       Appearance: She is obese. She is not toxic-appearing or diaphoretic.      Comments: fatigued   HENT:      Head:  Normocephalic.      Mouth/Throat:      Mouth: Mucous membranes are moist.   Eyes:      General:         Right eye: No discharge.         Left eye: No discharge.   Neck:      Musculoskeletal: Neck supple.   Cardiovascular:      Rate and Rhythm: Normal rate and regular rhythm.   Pulmonary:      Breath sounds: Rales present. No wheezing.      Comments: tachypneic  Abdominal:      Palpations: Abdomen is soft.      Tenderness: There is no abdominal tenderness.   Musculoskeletal:         General: Swelling (trace) present.   Skin:     General: Skin is warm and dry.   Neurological:      Mental Status: She is alert and oriented to person, place, and time.   Psychiatric:         Mood and Affect: Mood normal.         Behavior: Behavior normal.         Fluids    Intake/Output Summary (Last 24 hours) at 6/19/2020 1423  Last data filed at 6/19/2020 1331  Gross per 24 hour   Intake 360 ml   Output --   Net 360 ml       Laboratory  Recent Labs     06/17/20  0434 06/18/20  0331 06/19/20  0535   WBC 16.6* 12.1* 12.9*   RBC 2.47* 2.71* 2.80*   HEMOGLOBIN 7.6* 8.3* 8.6*   HEMATOCRIT 24.1* 27.7* 27.9*   MCV 97.6 102.2* 99.6*   MCH 30.8 30.6 30.7   MCHC 31.5* 30.0* 30.8*   RDW 54.1* 54.8* 51.7*   PLATELETCT 262 243 236   MPV 9.7 9.6 9.4     Recent Labs     06/17/20  0434 06/18/20  0331 06/19/20  0535   SODIUM 132* 133* 140   POTASSIUM 5.0 4.9 4.4   CHLORIDE 94* 95* 101   CO2 30 28 25   GLUCOSE 87 80 93   BUN 62* 65* 65*   CREATININE 2.29* 1.95* 1.95*   CALCIUM 9.6 9.2 8.8                   Imaging  US-RENAL ARTERY DUPLEX COMP   Final Result      CT-CHEST (THORAX) W/O   Final Result         1. No acute abnormality in the chest.   2. Mild subcarinal lymphadenopathy, most likely reactive. It has improved since prior study.   3. Early emphysema.         EC-ECHOCARDIOGRAM COMPLETE W/O CONT   Final Result      DX-CHEST-PORTABLE (1 VIEW)   Final Result         Diffuse interstitial prominence could relate to mild pulmonary edema.      Stable  cardiomegaly.           Assessment/Plan  * Chronic obstructive pulmonary disease with acute exacerbation (HCC)- (present on admission)  Assessment & Plan  With exacerbation  Prednisone for 5 days. Oxygen as needed, Respiratory protocol, Bronchodilators, Incentive spirometry    CT chest showed emphysematous changes    Hyperkalemia- (present on admission)  Assessment & Plan  Creatinine fluctuating for a couple years  Hyperkalemic, improved with lasix, continue  Renal artery US neg for stenosis  Renal diet  Telemetry    Elevated troponin  Assessment & Plan  Has worsening shortness of breath  Troponin I 100.  Likely demand ischemia  Cardiology consulted, f/u outpatient    ACP (advance care planning)  Assessment & Plan  Full code    CKD (chronic kidney disease), stage IV ([facility])- (present on admission)  Assessment & Plan  Avoid nephrotoxins as much as possible, renally dose medications, monitor inputs and outputs    Hyperkalemia improved with lasix  Renal diet  Renal US no artery stenosis    Mixed anemia- (present on admission)  Assessment & Plan  Hgb decreased, Check stool for occult blood    DM type 2, uncontrolled, with renal complications (HCC)- (present on admission)  Assessment & Plan  With hyperglycemia, improved  A1c 6.7%  Cont lantus, ISS  Accu-Checks, hypoglycemia protocol     Chronic respiratory failure with hypoxia (HCC)- (present on admission)  Assessment & Plan  Cont O2 support    Non-rheumatic mitral valve stenosis- (present on admission)  Assessment & Plan  Seen on TTE  Cardiology consulted    Acute on chronic diastolic congestive heart failure (HCC)- (present on admission)  Assessment & Plan  Fluid overloaded  Continue lasix, transitioning to oral    Obesity (BMI 30-39.9)- (present on admission)  Assessment & Plan  Body mass index is 36.61 kg/m².  Counseling provided.      Dyslipidemia- (present on admission)  Assessment & Plan  Resume home rosuvastatin    Tobacco dependence- (present on  admission)  Assessment & Plan  Received counseling    Essential hypertension- (present on admission)  Assessment & Plan  Intermittently hypertensive  On metop and norvasc and lasix  Avoiding ACEI and aldactone because of CKD and hyperkalemia  monitor       VTE prophylaxis: scds

## 2020-06-19 NOTE — PROGRESS NOTES
Received report from day shift RN. Assumed care of patient at 1900. Patient A&Ox 4. On 4L NC, moderate work of breathing. Patient states 6/10 pain. POC discussed and agreed upon with patient. Call light and belongings within reach. Bed in lowest locked position. Upper side rails raised. Fall risk precautions in place. All questions answered at this time. Hourly rounding in place. Patient on tele box. Will continue to monitor respiratory status.

## 2020-06-19 NOTE — DISCHARGE PLANNING
@1136  Agency/Facility Name: [facility]  Spoke To: Intake  Outcome: Will deliver portable tank today.    @1450  Agency/Facility Name: [facility]  Spoke To: Lorri  Outcome: Per request sent Insurance card so they can check insurance.    Received Choice form at 1250  Agency/Facility Name: [facility]  Referral sent per Choice form @ 125

## 2020-06-19 NOTE — THERAPY
Occupational Therapy   Initial Evaluation     Patient Name: Priya Callahan  Age:  69 y.o., Sex:  female  Medical Record #: [id number]  Today's Date: 6/19/2020     Precautions  Precautions: Fall Risk  Comments: due to o2 line    Assessment  Patient is 69 y.o. female with a diagnosis of COPD exacerbation. Pt was able to perform basic self care tasks and functional mobility with supervision. Pt appears to be close to her functional baseline and reports no concerns discharging back to [facility] once medically cleared.     Plan    Recommend Occupational Therapy for Evaluation only     Discharge recommendations:  Recommend home health for continued occupational therapy services.          Objective       06/19/20 0823   Prior Living Situation   Prior Services Intermittent Physical Support for ADL Per Service;Other (Comments)  ([facility])   Housing / Facility Assisted Living Residence   Bathroom Set up Walk In Shower;Shower Chair;Grab Bars   Equipment Owned Front-Wheel Walker;Power Wheel Chair;Grab Bar(s) By Toilet;Grab Bar(s) In Tub / Shower;Tub / Shower Seat   Lives with - Patient's Self Care Capacity Other (Comments)  ([facility])   Comments I with ADLs, medication management. Facility assists with meals and housekeeping tasks   Prior Level of ADL Function   Self Feeding Independent   Grooming / Hygiene Independent   Bathing Requires Assist  (post shower assist for safety d/t fatigue)   Dressing Independent   Toileting Independent   Prior Level of IADL Function   Medication Management Independent   Laundry Requires Assist   Finances Independent   Home Management Requires Assist   Shopping Requires Assist   Prior Level Of Mobility Independent With Device in Home   Driving / Transportation Other (Comments)  ([facility] provides transportation)   Occupation (Pre-Hospital Vocational) Retired Due To Age   Vitals   O2 (LPM) 4   O2 Delivery Device Silicone Nasal Cannula   Vitals Comments baseline uses 4L O2   Bed Mobility    Supine to Sit  Supervised   Sit to Supine Supervised   Scooting Supervised   Rolling Supervised   Skilled Intervention Verbal Cuing   Comments sleep in recliner and has adjustable bed at home.    ADL Assessment   Eating Independent   Grooming Supervision;Standing   Bathing   (discussed home s/u )   Upper Body Dressing Supervision   Lower Body Dressing Supervision   Toileting Supervision   Functional Mobility   Sit to Stand Supervised   Bed, Chair, Wheelchair Transfer Supervised   Toilet Transfers Supervised   Transfer Method Stand Pivot   Comments w/FWW   Anticipated Discharge Equipment   DC Equipment None

## 2020-06-19 NOTE — CARE PLAN
Problem: Safety  Goal: Will remain free from falls  Note: Patient encouraged to use call light when assistance needed. Patient room close to nursing station. Bed alarm on. Appropriate assistive devices provided. Hourly rounding in place.      Problem: Respiratory:  Goal: Respiratory status will improve  Note: Patient's O2 status assessed. Work of breathing assessed. Changes in work of breathing and O2 status addressed appropriately. Lungs auscultated.

## 2020-06-19 NOTE — DISCHARGE PLANNING
Anticipated Discharge Disposition: [facility] w/ HH    Action: Patient selected [facility] first and if they can't accept her, [facility].  Plan is for patient to discharge back to [facility] tomorrow.  Spoke with patient about transportation and she is calling [facility] to make them aware she is going to be discharging tomorrow and will need transport.   PCF completed and faxed to [facility].    Barriers to Discharge: Acceptance by HH.  Transportation arrangements    Plan: CM to follow up with [facility] on status of referral and to follow up with patient for transportation arrangements.

## 2020-06-19 NOTE — FACE TO FACE
Face to Face Supporting Documentation - Home Health    The encounter with this patient was in whole or in part the primary reason for home health admission.    Date of encounter:   Patient:                    MRN:                       YOB: 2021  Priya Callahan  [id number]  1950     Home health to see patient for:  Skilled Nursing care for assessment, interventions & education, Physical Therapy evaluation and treatment and Occupational therapy evaluation and treatment    Skilled need for:  Exacerbation of Chronic Disease State COPD, mitral stenosis    Skilled nursing interventions to include:  Comment: monitor vital signs, PTOT    Homebound status evidenced by:  Need the aid of supportive devices such as crutches, canes, wheelchairs or walkers or Needs the assistance of another person in order to leave the home. Leaving home requires a considerable and taxing effort. There is a normal inability to leave the home.    Community Physician to provide follow up care: Crystal Ramos M.D.     Optional Interventions? No      I certify the face to face encounter for this home health care referral meets the CMS requirements and the encounter/clinical assessment with the patient was, in whole, or in part, for the medical condition(s) listed above, which is the primary reason for home health care. Based on my clinical findings: the service(s) are medically necessary, support the need for home health care, and the homebound criteria are met.  I certify that this patient has had a face to face encounter by myself.  Shiraz Beltran M.D. - NPI: [id number]

## 2020-06-19 NOTE — PROGRESS NOTES
Received bedside report from RN, pt care assumed, VSS, pt assessment complete. Pt AAOx4, chronic c/o  pain at this time. No signs of acute distress noted at this time. POC discussed with pt and verbalizes no questions. Pt denies any additional needs at this time. Bed in lowest position, bed alarm on, pt educated on fall risk and verbalized understanding, call light within reach, hourly rounding initiated. In a sinus rhythm.

## 2020-06-20 LAB
ANION GAP SERPL CALC-SCNC: 9 MMOL/L (ref 7–16)
BACTERIA BLD CULT: NORMAL
BACTERIA BLD CULT: NORMAL
BASOPHILS # BLD AUTO: 0.1 % (ref 0–1.8)
BASOPHILS # BLD: 0.01 K/UL (ref 0–0.12)
BUN SERPL-MCNC: 65 MG/DL (ref 8–22)
CALCIUM SERPL-MCNC: 8.9 MG/DL (ref 8.5–10.5)
CHLORIDE SERPL-SCNC: 99 MMOL/L (ref 96–112)
CO2 SERPL-SCNC: 27 MMOL/L (ref 20–33)
CREAT SERPL-MCNC: 2.06 MG/DL (ref 0.5–1.4)
EOSINOPHIL # BLD AUTO: 0.06 K/UL (ref 0–0.51)
EOSINOPHIL NFR BLD: 0.4 % (ref 0–6.9)
ERYTHROCYTE [DISTWIDTH] IN BLOOD BY AUTOMATED COUNT: 49.8 FL (ref 35.9–50)
GLUCOSE BLD-MCNC: 149 MG/DL (ref 65–99)
GLUCOSE BLD-MCNC: 161 MG/DL (ref 65–99)
GLUCOSE BLD-MCNC: 174 MG/DL (ref 65–99)
GLUCOSE BLD-MCNC: 198 MG/DL (ref 65–99)
GLUCOSE BLD-MCNC: 260 MG/DL (ref 65–99)
GLUCOSE SERPL-MCNC: 97 MG/DL (ref 65–99)
HCT VFR BLD AUTO: 29 % (ref 37–47)
HGB BLD-MCNC: 9.1 G/DL (ref 12–16)
IMM GRANULOCYTES # BLD AUTO: 0.17 K/UL (ref 0–0.11)
IMM GRANULOCYTES NFR BLD AUTO: 1.3 % (ref 0–0.9)
LYMPHOCYTES # BLD AUTO: 1.09 K/UL (ref 1–4.8)
LYMPHOCYTES NFR BLD: 8.1 % (ref 22–41)
MCH RBC QN AUTO: 30.3 PG (ref 27–33)
MCHC RBC AUTO-ENTMCNC: 31.4 G/DL (ref 33.6–35)
MCV RBC AUTO: 96.7 FL (ref 81.4–97.8)
MONOCYTES # BLD AUTO: 0.98 K/UL (ref 0–0.85)
MONOCYTES NFR BLD AUTO: 7.3 % (ref 0–13.4)
NEUTROPHILS # BLD AUTO: 11.07 K/UL (ref 2–7.15)
NEUTROPHILS NFR BLD: 82.8 % (ref 44–72)
NRBC # BLD AUTO: 0 K/UL
NRBC BLD-RTO: 0 /100 WBC
PLATELET # BLD AUTO: 249 K/UL (ref 164–446)
PMV BLD AUTO: 9.3 FL (ref 9–12.9)
POTASSIUM SERPL-SCNC: 5.2 MMOL/L (ref 3.6–5.5)
RBC # BLD AUTO: 3 M/UL (ref 4.2–5.4)
SIGNIFICANT IND 70042: NORMAL
SIGNIFICANT IND 70042: NORMAL
SITE SITE: NORMAL
SITE SITE: NORMAL
SODIUM SERPL-SCNC: 135 MMOL/L (ref 135–145)
SOURCE SOURCE: NORMAL
SOURCE SOURCE: NORMAL
WBC # BLD AUTO: 13.4 K/UL (ref 4.8–10.8)

## 2020-06-20 PROCEDURE — 700102 HCHG RX REV CODE 250 W/ 637 OVERRIDE(OP): Performed by: INTERNAL MEDICINE

## 2020-06-20 PROCEDURE — 82962 GLUCOSE BLOOD TEST: CPT | Mod: 91

## 2020-06-20 PROCEDURE — 80048 BASIC METABOLIC PNL TOTAL CA: CPT

## 2020-06-20 PROCEDURE — 700111 HCHG RX REV CODE 636 W/ 250 OVERRIDE (IP): Performed by: HOSPITALIST

## 2020-06-20 PROCEDURE — A9270 NON-COVERED ITEM OR SERVICE: HCPCS | Performed by: PHYSICIAN ASSISTANT

## 2020-06-20 PROCEDURE — 85025 COMPLETE CBC W/AUTO DIFF WBC: CPT

## 2020-06-20 PROCEDURE — A9270 NON-COVERED ITEM OR SERVICE: HCPCS | Performed by: INTERNAL MEDICINE

## 2020-06-20 PROCEDURE — 94640 AIRWAY INHALATION TREATMENT: CPT

## 2020-06-20 PROCEDURE — 99232 SBSQ HOSP IP/OBS MODERATE 35: CPT | Performed by: INTERNAL MEDICINE

## 2020-06-20 PROCEDURE — A9270 NON-COVERED ITEM OR SERVICE: HCPCS | Performed by: HOSPITALIST

## 2020-06-20 PROCEDURE — 700102 HCHG RX REV CODE 250 W/ 637 OVERRIDE(OP): Performed by: PHYSICIAN ASSISTANT

## 2020-06-20 PROCEDURE — 94760 N-INVAS EAR/PLS OXIMETRY 1: CPT

## 2020-06-20 PROCEDURE — 700102 HCHG RX REV CODE 250 W/ 637 OVERRIDE(OP): Performed by: HOSPITALIST

## 2020-06-20 PROCEDURE — 36415 COLL VENOUS BLD VENIPUNCTURE: CPT

## 2020-06-20 PROCEDURE — 770020 HCHG ROOM/CARE - TELE (206)

## 2020-06-20 RX ORDER — AMLODIPINE BESYLATE 5 MG/1
5 TABLET ORAL ONCE
Status: COMPLETED | OUTPATIENT
Start: 2020-06-20 | End: 2020-06-20

## 2020-06-20 RX ORDER — HYDRALAZINE HYDROCHLORIDE 25 MG/1
25 TABLET, FILM COATED ORAL EVERY 8 HOURS
Status: DISCONTINUED | OUTPATIENT
Start: 2020-06-20 | End: 2020-06-22 | Stop reason: HOSPADM

## 2020-06-20 RX ADMIN — HYDRALAZINE HYDROCHLORIDE 25 MG: 25 TABLET, FILM COATED ORAL at 23:12

## 2020-06-20 RX ADMIN — AMITRIPTYLINE HYDROCHLORIDE 100 MG: 50 TABLET, FILM COATED ORAL at 19:50

## 2020-06-20 RX ADMIN — LEVOTHYROXINE SODIUM 75 MCG: 0.07 TABLET ORAL at 05:04

## 2020-06-20 RX ADMIN — INSULIN HUMAN 2 UNITS: 100 INJECTION, SOLUTION PARENTERAL at 12:21

## 2020-06-20 RX ADMIN — CLONIDINE HYDROCHLORIDE 0.1 MG: 0.1 TABLET ORAL at 00:05

## 2020-06-20 RX ADMIN — TRAZODONE HYDROCHLORIDE 50 MG: 50 TABLET ORAL at 05:04

## 2020-06-20 RX ADMIN — INSULIN GLARGINE 20 UNITS: 100 INJECTION, SOLUTION SUBCUTANEOUS at 22:58

## 2020-06-20 RX ADMIN — ALBUTEROL SULFATE 2 PUFF: 90 AEROSOL, METERED RESPIRATORY (INHALATION) at 11:49

## 2020-06-20 RX ADMIN — OXYCODONE HYDROCHLORIDE 10 MG: 10 TABLET ORAL at 19:51

## 2020-06-20 RX ADMIN — GUAIFENESIN 1200 MG: 600 TABLET, EXTENDED RELEASE ORAL at 17:31

## 2020-06-20 RX ADMIN — HYDRALAZINE HYDROCHLORIDE 25 MG: 25 TABLET, FILM COATED ORAL at 08:26

## 2020-06-20 RX ADMIN — DOCUSATE SODIUM 50 MG AND SENNOSIDES 8.6 MG 2 TABLET: 8.6; 5 TABLET, FILM COATED ORAL at 17:31

## 2020-06-20 RX ADMIN — DOCUSATE SODIUM 50 MG AND SENNOSIDES 8.6 MG 2 TABLET: 8.6; 5 TABLET, FILM COATED ORAL at 05:04

## 2020-06-20 RX ADMIN — FUROSEMIDE 40 MG: 40 TABLET ORAL at 05:04

## 2020-06-20 RX ADMIN — TRAZODONE HYDROCHLORIDE 50 MG: 50 TABLET ORAL at 19:50

## 2020-06-20 RX ADMIN — ROSUVASTATIN CALCIUM 10 MG: 20 TABLET, FILM COATED ORAL at 17:30

## 2020-06-20 RX ADMIN — METOPROLOL TARTRATE 25 MG: 25 TABLET, FILM COATED ORAL at 17:31

## 2020-06-20 RX ADMIN — PREDNISONE 40 MG: 20 TABLET ORAL at 10:49

## 2020-06-20 RX ADMIN — GUAIFENESIN 1200 MG: 600 TABLET, EXTENDED RELEASE ORAL at 05:04

## 2020-06-20 RX ADMIN — INSULIN HUMAN 5 UNITS: 100 INJECTION, SOLUTION PARENTERAL at 22:57

## 2020-06-20 RX ADMIN — AMLODIPINE BESYLATE 10 MG: 10 TABLET ORAL at 05:04

## 2020-06-20 RX ADMIN — NICOTINE 14 MG: 14 PATCH TRANSDERMAL at 05:05

## 2020-06-20 RX ADMIN — INSULIN HUMAN 2 UNITS: 100 INJECTION, SOLUTION PARENTERAL at 17:32

## 2020-06-20 RX ADMIN — AMLODIPINE BESYLATE 5 MG: 5 TABLET ORAL at 00:50

## 2020-06-20 ASSESSMENT — ENCOUNTER SYMPTOMS
DIZZINESS: 0
SPUTUM PRODUCTION: 0
WEAKNESS: 1
VOMITING: 0
NAUSEA: 0
COUGH: 1
SHORTNESS OF BREATH: 1
DEPRESSION: 0
HEMOPTYSIS: 0
ABDOMINAL PAIN: 0
WHEEZING: 0
MYALGIAS: 0

## 2020-06-20 NOTE — PROGRESS NOTES
Hospital Medicine Daily Progress Note    Date of Service  6/20/2020    Chief Complaint  69 y.o. female admitted 6/15/2020 with SOB.    Hospital Course    PMH COPD, CHF, HTN, chronic home O2, CKD IV, HLD, tobacco use who presented with weakness and cough and admitted for COPD exacerbation. CXR showed mild pulm edema. COVID PCR was negative. Cardiology was consulted for elevated troponin.  She also had hyperkalemia and pulm edema that improved with lasix. TTE showed EF 65%, moderate mitral stenosis, mild aortic stenosis. Her cardiac medications were optimized, but will remain off of lisinopril. Renal artery US did not show stenosis. Her HTN had improved control will need further medication management as outpatient. Cardiology recommends outpatient follow up, will need a referral from her PCP.       Interval Problem Update  SBP to 202 overnight  She says SOB continues to improve    Consultants/Specialty  Cardiology    Code Status  Full Code    Disposition  Lives in [place] at [facility]  Ordered     Review of Systems  Review of Systems   Constitutional: Negative for malaise/fatigue.   HENT: Negative for congestion.    Respiratory: Positive for cough (improved) and shortness of breath (improved). Negative for hemoptysis, sputum production and wheezing.    Cardiovascular: Negative for chest pain.   Gastrointestinal: Negative for abdominal pain, nausea and vomiting.   Genitourinary: Negative for dysuria.   Musculoskeletal: Negative for myalgias.   Neurological: Positive for weakness (improved). Negative for dizziness.   Psychiatric/Behavioral: Negative for depression.        Physical Exam  Temp:  [36.1 °C (96.9 °F)-36.8 °C (98.2 °F)] 36.4 °C (97.5 °F)  Pulse:  [56-73] 63  Resp:  [16-20] 16  BP: (114-202)/(50-93) 134/55  SpO2:  [92 %-98 %] 96 %    Physical Exam  Vitals signs and nursing note reviewed.   Constitutional:       Appearance: She is obese. She is not toxic-appearing or diaphoretic.   HENT:      Head: Normocephalic.       Mouth/Throat:      Mouth: Mucous membranes are moist.   Eyes:      General:         Right eye: No discharge.         Left eye: No discharge.   Neck:      Musculoskeletal: Neck supple.   Cardiovascular:      Rate and Rhythm: Normal rate and regular rhythm.   Pulmonary:      Effort: No respiratory distress.      Breath sounds: No wheezing or rales.   Abdominal:      Palpations: Abdomen is soft.      Tenderness: There is no abdominal tenderness.   Musculoskeletal:         General: Swelling (trace) present.   Skin:     General: Skin is warm and dry.   Neurological:      Mental Status: She is alert and oriented to person, place, and time.   Psychiatric:         Mood and Affect: Mood normal.         Behavior: Behavior normal.         Fluids    Intake/Output Summary (Last 24 hours) at 6/20/2020 1459  Last data filed at 6/19/2020 2000  Gross per 24 hour   Intake 620 ml   Output 800 ml   Net -180 ml       Laboratory  Recent Labs     06/18/20  0331 06/19/20  0535 06/20/20  0348   WBC 12.1* 12.9* 13.4*   RBC 2.71* 2.80* 3.00*   HEMOGLOBIN 8.3* 8.6* 9.1*   HEMATOCRIT 27.7* 27.9* 29.0*   .2* 99.6* 96.7   MCH 30.6 30.7 30.3   MCHC 30.0* 30.8* 31.4*   RDW 54.8* 51.7* 49.8   PLATELETCT 243 236 249   MPV 9.6 9.4 9.3     Recent Labs     06/18/20  0331 06/19/20  0535 06/20/20  0348   SODIUM 133* 140 135   POTASSIUM 4.9 4.4 5.2   CHLORIDE 95* 101 99   CO2 28 25 27   GLUCOSE 80 93 97   BUN 65* 65* 65*   CREATININE 1.95* 1.95* 2.06*   CALCIUM 9.2 8.8 8.9                   Imaging  US-RENAL ARTERY DUPLEX COMP   Final Result      CT-CHEST (THORAX) W/O   Final Result         1. No acute abnormality in the chest.   2. Mild subcarinal lymphadenopathy, most likely reactive. It has improved since prior study.   3. Early emphysema.         EC-ECHOCARDIOGRAM COMPLETE W/O CONT   Final Result      DX-CHEST-PORTABLE (1 VIEW)   Final Result         Diffuse interstitial prominence could relate to mild pulmonary edema.      Stable  cardiomegaly.           Assessment/Plan  * Chronic obstructive pulmonary disease with acute exacerbation (HCC)- (present on admission)  Assessment & Plan  With exacerbation  Prednisone for 5 days. Oxygen as needed, Respiratory protocol, Bronchodilators, Incentive spirometry    CT chest showed emphysematous changes    Hyperkalemia- (present on admission)  Assessment & Plan  Creatinine fluctuating for a couple years  Hyperkalemic, improved with lasix, continue  Renal artery US neg for stenosis  Renal diet  Telemetry    Elevated troponin  Assessment & Plan  Has worsening shortness of breath  Troponin I 100.  Likely demand ischemia  Cardiology consulted, f/u outpatient    ACP (advance care planning)  Assessment & Plan  Full code    CKD (chronic kidney disease), stage IV ([facility])- (present on admission)  Assessment & Plan  Avoid nephrotoxins as much as possible, renally dose medications, monitor inputs and outputs    Hyperkalemia improved with lasix  Renal diet  Renal US no artery stenosis    Mixed anemia- (present on admission)  Assessment & Plan  Hgb decreased, Check stool for occult blood    DM type 2, uncontrolled, with renal complications (HCC)- (present on admission)  Assessment & Plan  With hyperglycemia, improved  A1c 6.7%  Cont lantus, ISS  Accu-Checks, hypoglycemia protocol     Chronic respiratory failure with hypoxia (HCC)- (present on admission)  Assessment & Plan  Cont O2 support    Non-rheumatic mitral valve stenosis- (present on admission)  Assessment & Plan  Seen on TTE  Cardiology consulted    Acute on chronic diastolic congestive heart failure (HCC)- (present on admission)  Assessment & Plan  Fluid overloaded  Continue lasix, transitioning to oral    Obesity (BMI 30-39.9)- (present on admission)  Assessment & Plan  Body mass index is 36.61 kg/m².  Counseling provided.      Dyslipidemia- (present on admission)  Assessment & Plan  Resume home rosuvastatin    Tobacco dependence- (present on  admission)  Assessment & Plan  Received counseling    Essential hypertension- (present on admission)  Assessment & Plan  On metop and norvasc and lasix  Avoiding ACEI and aldactone because of CKD and hyperkalemia  Had hypertensive urgency overnight with SBP >200. Add hydralazine and trend response       VTE prophylaxis: scds

## 2020-06-20 NOTE — PROGRESS NOTES
Bedside report received, pt care assumed, tele box on.  Pt denies any additional needs at this time. Bed in lowest position, bed alarm on pt educated on fall risk and verbalized understanding, call light within reach.Pt sleeping, appears comfortable. Respirations even and unlabored. No needs at this time.

## 2020-06-20 NOTE — CARE PLAN
Problem: Communication  Goal: The ability to communicate needs accurately and effectively will improve  Note: Questions and concerns encouraged to be voiced. Questions and concerns addressed appropriately. Time given for patient to express self and update on whether needs being met.     Problem: Safety  Goal: Will remain free from falls  Note: Bed alarm on. Hourly rounding. Treaded slipper socks. Assistive devices used. Patient encouraged to use call light when assistance needed.

## 2020-06-20 NOTE — RESPIRATORY CARE
Oxygen Rounds      Patient found on    O2 L/m:  4  Oxygen device:  snc  Spo2: 98%        Respiratory device skin site inspection completed.

## 2020-06-20 NOTE — PROGRESS NOTES
Patient's /79. MD notified. Received orders for 5 mg amlodipine once and 0.1 mg clonidine once. MD aware that patient already receiving 10 mg amlodipine daily. Will continue to monitor blood pressure.

## 2020-06-20 NOTE — PROGRESS NOTES
Received report from day shift RN. Assumed care of patient at 1900. Patient A&Ox 4. On 4L NC, moderate work of breathing. Patient states 6/10 pain. POC discussed and agreed upon with patient. Call light and belongings within reach. Bed in lowest locked position. Upper side rails raised. Bed alarm on. Fall risk precautions in place. All questions answered at this time. Hourly rounding in place. Patient on tele box. Will continue to monitor respiratory status.

## 2020-06-21 PROBLEM — R79.89 ELEVATED TROPONIN: Status: RESOLVED | Noted: 2020-06-15 | Resolved: 2020-06-21

## 2020-06-21 PROBLEM — E87.5 HYPERKALEMIA: Status: RESOLVED | Noted: 2020-06-15 | Resolved: 2020-06-21

## 2020-06-21 PROBLEM — Z71.89 ACP (ADVANCE CARE PLANNING): Status: RESOLVED | Noted: 2019-06-12 | Resolved: 2020-06-21

## 2020-06-21 LAB
GLUCOSE BLD-MCNC: 146 MG/DL (ref 65–99)
GLUCOSE BLD-MCNC: 168 MG/DL (ref 65–99)
GLUCOSE BLD-MCNC: 64 MG/DL (ref 65–99)
GLUCOSE BLD-MCNC: 79 MG/DL (ref 65–99)

## 2020-06-21 PROCEDURE — 700102 HCHG RX REV CODE 250 W/ 637 OVERRIDE(OP): Performed by: PHYSICIAN ASSISTANT

## 2020-06-21 PROCEDURE — 82962 GLUCOSE BLOOD TEST: CPT | Mod: 91

## 2020-06-21 PROCEDURE — A9270 NON-COVERED ITEM OR SERVICE: HCPCS | Performed by: PHYSICIAN ASSISTANT

## 2020-06-21 PROCEDURE — A9270 NON-COVERED ITEM OR SERVICE: HCPCS | Performed by: INTERNAL MEDICINE

## 2020-06-21 PROCEDURE — A9270 NON-COVERED ITEM OR SERVICE: HCPCS | Performed by: HOSPITALIST

## 2020-06-21 PROCEDURE — 700102 HCHG RX REV CODE 250 W/ 637 OVERRIDE(OP): Performed by: INTERNAL MEDICINE

## 2020-06-21 PROCEDURE — 99231 SBSQ HOSP IP/OBS SF/LOW 25: CPT | Performed by: INTERNAL MEDICINE

## 2020-06-21 PROCEDURE — 700102 HCHG RX REV CODE 250 W/ 637 OVERRIDE(OP): Performed by: HOSPITALIST

## 2020-06-21 PROCEDURE — 770020 HCHG ROOM/CARE - TELE (206)

## 2020-06-21 PROCEDURE — 94760 N-INVAS EAR/PLS OXIMETRY 1: CPT

## 2020-06-21 RX ORDER — DEXTROSE MONOHYDRATE 25 G/50ML
50 INJECTION, SOLUTION INTRAVENOUS
Status: DISCONTINUED | OUTPATIENT
Start: 2020-06-21 | End: 2020-06-22 | Stop reason: HOSPADM

## 2020-06-21 RX ORDER — FUROSEMIDE 40 MG/1
40 TABLET ORAL DAILY
Qty: 30 TAB | Refills: 0 | Status: ON HOLD
Start: 2020-06-22 | End: 2020-07-22

## 2020-06-21 RX ORDER — AMLODIPINE BESYLATE 10 MG/1
10 TABLET ORAL DAILY
Qty: 30 TAB | Refills: 0 | Status: ON HOLD
Start: 2020-06-22 | End: 2020-07-22

## 2020-06-21 RX ORDER — HYDRALAZINE HYDROCHLORIDE 25 MG/1
25 TABLET, FILM COATED ORAL 2 TIMES DAILY
Qty: 60 TAB | Refills: 0 | Status: SHIPPED | OUTPATIENT
Start: 2020-06-21 | End: 2020-06-22

## 2020-06-21 RX ADMIN — LEVOTHYROXINE SODIUM 75 MCG: 0.07 TABLET ORAL at 05:31

## 2020-06-21 RX ADMIN — GUAIFENESIN 1200 MG: 600 TABLET, EXTENDED RELEASE ORAL at 17:02

## 2020-06-21 RX ADMIN — OXYCODONE HYDROCHLORIDE 10 MG: 10 TABLET ORAL at 01:47

## 2020-06-21 RX ADMIN — DOCUSATE SODIUM 50 MG AND SENNOSIDES 8.6 MG 2 TABLET: 8.6; 5 TABLET, FILM COATED ORAL at 05:32

## 2020-06-21 RX ADMIN — METOPROLOL TARTRATE 25 MG: 25 TABLET, FILM COATED ORAL at 17:01

## 2020-06-21 RX ADMIN — OXYCODONE HYDROCHLORIDE 10 MG: 10 TABLET ORAL at 17:07

## 2020-06-21 RX ADMIN — AMLODIPINE BESYLATE 10 MG: 10 TABLET ORAL at 05:32

## 2020-06-21 RX ADMIN — HYDRALAZINE HYDROCHLORIDE 25 MG: 25 TABLET, FILM COATED ORAL at 05:31

## 2020-06-21 RX ADMIN — HYDRALAZINE HYDROCHLORIDE 25 MG: 25 TABLET, FILM COATED ORAL at 14:20

## 2020-06-21 RX ADMIN — DOCUSATE SODIUM 50 MG AND SENNOSIDES 8.6 MG 2 TABLET: 8.6; 5 TABLET, FILM COATED ORAL at 17:01

## 2020-06-21 RX ADMIN — INSULIN HUMAN 1 UNITS: 100 INJECTION, SOLUTION PARENTERAL at 20:49

## 2020-06-21 RX ADMIN — OXYCODONE HYDROCHLORIDE 10 MG: 10 TABLET ORAL at 23:03

## 2020-06-21 RX ADMIN — AMITRIPTYLINE HYDROCHLORIDE 100 MG: 50 TABLET, FILM COATED ORAL at 20:44

## 2020-06-21 RX ADMIN — HYDRALAZINE HYDROCHLORIDE 25 MG: 25 TABLET, FILM COATED ORAL at 20:44

## 2020-06-21 RX ADMIN — FUROSEMIDE 40 MG: 40 TABLET ORAL at 05:32

## 2020-06-21 RX ADMIN — TRAZODONE HYDROCHLORIDE 50 MG: 50 TABLET ORAL at 05:32

## 2020-06-21 RX ADMIN — INSULIN GLARGINE 20 UNITS: 100 INJECTION, SOLUTION SUBCUTANEOUS at 20:51

## 2020-06-21 RX ADMIN — ROSUVASTATIN CALCIUM 10 MG: 20 TABLET, FILM COATED ORAL at 17:02

## 2020-06-21 RX ADMIN — METOPROLOL TARTRATE 25 MG: 25 TABLET, FILM COATED ORAL at 05:31

## 2020-06-21 RX ADMIN — GUAIFENESIN 1200 MG: 600 TABLET, EXTENDED RELEASE ORAL at 05:31

## 2020-06-21 RX ADMIN — NICOTINE 14 MG: 14 PATCH TRANSDERMAL at 05:32

## 2020-06-21 RX ADMIN — TRAZODONE HYDROCHLORIDE 50 MG: 50 TABLET ORAL at 20:44

## 2020-06-21 RX ADMIN — ACETAMINOPHEN 650 MG: 325 TABLET, FILM COATED ORAL at 19:35

## 2020-06-21 ASSESSMENT — ENCOUNTER SYMPTOMS
DIZZINESS: 0
COUGH: 1
NAUSEA: 0
WHEEZING: 0
SHORTNESS OF BREATH: 0
ABDOMINAL PAIN: 0
DEPRESSION: 0
WEAKNESS: 0

## 2020-06-21 ASSESSMENT — FIBROSIS 4 INDEX: FIB4 SCORE: 0.94

## 2020-06-21 NOTE — PROGRESS NOTES
Morning report received at bedside. Care assumed. Pt alert and awake sitting up in bed. No complaints of pain or discomfort. AOx4 and on 4L O2. Tele monitor on. Bed in lowest position and locked. Call light and all belongings within reach. No further needs at this time.

## 2020-06-21 NOTE — DISCHARGE PLANNING
This RN CM informed SHITAL Olivo that [facility] cannot transport Pt to [place] today. Pt to discharge back to [facility].

## 2020-06-21 NOTE — PROGRESS NOTES
Hospital Medicine Daily Progress Note    Date of Service  6/21/2020    Chief Complaint  69 y.o. female admitted 6/15/2020 with SOB.    Hospital Course    PMH COPD, CHF, HTN, chronic home O2, CKD IV, HLD, tobacco use who presented with weakness and cough and admitted for COPD exacerbation. CXR showed mild pulm edema. COVID PCR was negative. Cardiology was consulted for elevated troponin.  She also had hyperkalemia and pulm edema that improved with lasix. TTE showed EF 65%, moderate mitral stenosis, mild aortic stenosis. Her cardiac medications were optimized, but will remain off of lisinopril. Renal artery US did not show stenosis. Her HTN had improved control will need further medication management as outpatient. Cardiology recommends outpatient follow up, will need a referral from her PCP.       Interval Problem Update  Bp control improved  She feels well  Unable to find transport back home today    Consultants/Specialty  Cardiology    Code Status  Full Code    Disposition  Lives in [place] at [facility]  Ordered HH    Review of Systems  Review of Systems   Constitutional: Negative for malaise/fatigue.   HENT: Negative for congestion.    Respiratory: Positive for cough (improved). Negative for shortness of breath and wheezing.    Cardiovascular: Negative for chest pain.   Gastrointestinal: Negative for abdominal pain and nausea.   Genitourinary: Negative for dysuria.   Neurological: Negative for dizziness and weakness.   Psychiatric/Behavioral: Negative for depression.        Physical Exam  Temp:  [35.9 °C (96.7 °F)-36.7 °C (98.1 °F)] 36.3 °C (97.3 °F)  Pulse:  [46-67] 55  Resp:  [16-20] 18  BP: (105-169)/(51-77) 141/57  SpO2:  [93 %-99 %] 98 %    Physical Exam  Vitals signs and nursing note reviewed.   Constitutional:       Appearance: She is obese. She is not toxic-appearing or diaphoretic.   HENT:      Head: Normocephalic.      Mouth/Throat:      Mouth: Mucous membranes are moist.   Cardiovascular:      Rate and  Rhythm: Normal rate and regular rhythm.   Pulmonary:      Effort: No respiratory distress.      Breath sounds: No wheezing or rales.   Abdominal:      Palpations: Abdomen is soft.      Tenderness: There is no abdominal tenderness.   Musculoskeletal:         General: Swelling (trace) present.   Skin:     General: Skin is warm and dry.   Neurological:      Mental Status: She is alert and oriented to person, place, and time.   Psychiatric:         Mood and Affect: Mood normal.         Behavior: Behavior normal.         Fluids    Intake/Output Summary (Last 24 hours) at 6/21/2020 1230  Last data filed at 6/21/2020 1000  Gross per 24 hour   Intake 480 ml   Output --   Net 480 ml       Laboratory  Recent Labs     06/19/20  0535 06/20/20  0348   WBC 12.9* 13.4*   RBC 2.80* 3.00*   HEMOGLOBIN 8.6* 9.1*   HEMATOCRIT 27.9* 29.0*   MCV 99.6* 96.7   MCH 30.7 30.3   MCHC 30.8* 31.4*   RDW 51.7* 49.8   PLATELETCT 236 249   MPV 9.4 9.3     Recent Labs     06/19/20  0535 06/20/20  0348   SODIUM 140 135   POTASSIUM 4.4 5.2   CHLORIDE 101 99   CO2 25 27   GLUCOSE 93 97   BUN 65* 65*   CREATININE 1.95* 2.06*   CALCIUM 8.8 8.9                   Imaging  US-RENAL ARTERY DUPLEX COMP   Final Result      CT-CHEST (THORAX) W/O   Final Result         1. No acute abnormality in the chest.   2. Mild subcarinal lymphadenopathy, most likely reactive. It has improved since prior study.   3. Early emphysema.         EC-ECHOCARDIOGRAM COMPLETE W/O CONT   Final Result      DX-CHEST-PORTABLE (1 VIEW)   Final Result         Diffuse interstitial prominence could relate to mild pulmonary edema.      Stable cardiomegaly.           Assessment/Plan  * Chronic obstructive pulmonary disease with acute exacerbation (HCC)- (present on admission)  Assessment & Plan  With exacerbation  Prednisone for 5 days. Oxygen as needed, Respiratory protocol, Bronchodilators, Incentive spirometry    CT chest showed emphysematous changes    CKD (chronic kidney disease),  stage IV (HCC)- (present on admission)  Assessment & Plan  Avoid nephrotoxins as much as possible, renally dose medications, monitor inputs and outputs    Hyperkalemia improved with lasix  Renal diet  Renal US no artery stenosis    Mixed anemia- (present on admission)  Assessment & Plan  Hgb decreased, Check stool for occult blood    DM type 2, uncontrolled, with renal complications (HCC)- (present on admission)  Assessment & Plan  With hyperglycemia, improved  A1c 6.7%  Cont lantus, ISS  Accu-Checks, hypoglycemia protocol     Chronic respiratory failure with hypoxia (HCC)- (present on admission)  Assessment & Plan  Cont O2 support    Non-rheumatic mitral valve stenosis- (present on admission)  Assessment & Plan  Seen on TTE  Cardiology consulted    Acute on chronic diastolic congestive heart failure (HCC)- (present on admission)  Assessment & Plan  Fluid overloaded  Continue lasix, transitioning to oral    Obesity (BMI 30-39.9)- (present on admission)  Assessment & Plan  Body mass index is 36.61 kg/m².  Counseling provided.      Dyslipidemia- (present on admission)  Assessment & Plan  Resume home rosuvastatin    Tobacco dependence- (present on admission)  Assessment & Plan  Received counseling    Essential hypertension- (present on admission)  Assessment & Plan  On metop and norvasc and lasix  Avoiding ACEI and aldactone because of CKD and hyperkalemia  Had hypertensive urgency overnight with SBP >200. Added hydralazine and improved       VTE prophylaxis: scds

## 2020-06-21 NOTE — CARE PLAN
Problem: Discharge Barriers/Planning  Goal: Patient's continuum of care needs will be met  Outcome: PROGRESSING AS EXPECTED     Problem: Respiratory:  Goal: Respiratory status will improve  Outcome: PROGRESSING AS EXPECTED

## 2020-06-21 NOTE — RESPIRATORY CARE
Oxygen Rounds      Patient found on    O2 L/m:  4  Oxygen device:  Nasal Cannula  Spo2: 99%      Respiratory device skin site inspection completed.      4L matched to home reg.

## 2020-06-21 NOTE — CARE PLAN
Problem: Communication  Goal: The ability to communicate needs accurately and effectively will improve  Note: Patient encouraged to use call light when assistance needed. Patient encouraged to voice questions and concerns; questions and concerns addressed appropriately. Patient given time to voice needs.      Problem: Knowledge Deficit  Goal: Knowledge of disease process/condition, treatment plan, diagnostic tests, and medications will improve  Note: Patient encouraged to use call light when assistance needed. Hourly rounding in place. Room free of clutter. Appropriate assistive device in use when ambulating. Gait assessed. Patient room close to nursing station. Bed alarm on.

## 2020-06-22 VITALS
SYSTOLIC BLOOD PRESSURE: 125 MMHG | WEIGHT: 222 LBS | TEMPERATURE: 97.4 F | RESPIRATION RATE: 18 BRPM | HEIGHT: 65 IN | HEART RATE: 71 BPM | DIASTOLIC BLOOD PRESSURE: 69 MMHG | OXYGEN SATURATION: 95 % | BODY MASS INDEX: 36.99 KG/M2

## 2020-06-22 LAB
GLUCOSE BLD-MCNC: 118 MG/DL (ref 65–99)
GLUCOSE BLD-MCNC: 122 MG/DL (ref 65–99)

## 2020-06-22 PROCEDURE — A9270 NON-COVERED ITEM OR SERVICE: HCPCS | Performed by: HOSPITALIST

## 2020-06-22 PROCEDURE — 82962 GLUCOSE BLOOD TEST: CPT | Mod: 91

## 2020-06-22 PROCEDURE — A9270 NON-COVERED ITEM OR SERVICE: HCPCS | Performed by: INTERNAL MEDICINE

## 2020-06-22 PROCEDURE — 700102 HCHG RX REV CODE 250 W/ 637 OVERRIDE(OP): Performed by: INTERNAL MEDICINE

## 2020-06-22 PROCEDURE — 99239 HOSP IP/OBS DSCHRG MGMT >30: CPT | Performed by: INTERNAL MEDICINE

## 2020-06-22 PROCEDURE — 94760 N-INVAS EAR/PLS OXIMETRY 1: CPT

## 2020-06-22 PROCEDURE — 700102 HCHG RX REV CODE 250 W/ 637 OVERRIDE(OP): Performed by: HOSPITALIST

## 2020-06-22 RX ORDER — HYDROXYZINE HYDROCHLORIDE 25 MG/1
25 TABLET, FILM COATED ORAL 3 TIMES DAILY PRN
Qty: 30 TAB | Refills: 0 | Status: SHIPPED | OUTPATIENT
Start: 2020-06-22

## 2020-06-22 RX ORDER — HYDRALAZINE HYDROCHLORIDE 25 MG/1
25 TABLET, FILM COATED ORAL 3 TIMES DAILY
Qty: 90 TAB | Refills: 0 | Status: ON HOLD
Start: 2020-06-22 | End: 2020-12-03

## 2020-06-22 RX ADMIN — HYDRALAZINE HYDROCHLORIDE 25 MG: 25 TABLET, FILM COATED ORAL at 13:06

## 2020-06-22 RX ADMIN — DOCUSATE SODIUM 50 MG AND SENNOSIDES 8.6 MG 2 TABLET: 8.6; 5 TABLET, FILM COATED ORAL at 05:35

## 2020-06-22 RX ADMIN — OXYCODONE HYDROCHLORIDE 10 MG: 10 TABLET ORAL at 12:46

## 2020-06-22 RX ADMIN — LEVOTHYROXINE SODIUM 75 MCG: 0.07 TABLET ORAL at 05:35

## 2020-06-22 RX ADMIN — FUROSEMIDE 40 MG: 40 TABLET ORAL at 05:35

## 2020-06-22 RX ADMIN — HYDRALAZINE HYDROCHLORIDE 25 MG: 25 TABLET, FILM COATED ORAL at 05:35

## 2020-06-22 RX ADMIN — NICOTINE 14 MG: 14 PATCH TRANSDERMAL at 05:34

## 2020-06-22 RX ADMIN — AMLODIPINE BESYLATE 10 MG: 10 TABLET ORAL at 05:35

## 2020-06-22 RX ADMIN — GUAIFENESIN 1200 MG: 600 TABLET, EXTENDED RELEASE ORAL at 05:35

## 2020-06-22 RX ADMIN — TRAZODONE HYDROCHLORIDE 50 MG: 50 TABLET ORAL at 05:35

## 2020-06-22 NOTE — PROGRESS NOTES
ANO 4. Patient given discharge instructions regarding follow up appointment, diet, activity, prescriptions, and when to call a provider. Patient demonstrated verbal understanding of information given. Tele box off, IV discharged, transported in wheel chair off of the unit with all belongings.

## 2020-06-22 NOTE — PROGRESS NOTES
"Spoke to TY Corado regarding patient's discharge. TY informed this RN that patient needs to call [facility] to set up transportation for self. Communicated findings to the patient. Patient upset and irritable upon hearing this information stating that she was told \"to not call because social work was working on it and I was supposed to be discharged Friday but they said they couldn't. Now they won't take me back because it's too late\". Encouraged patient to call. Per pt, facility will call patient back when they have an update.  "

## 2020-06-22 NOTE — PROGRESS NOTES
Discussed findings with the patient regarding home medications. Patient states a nurse had taken her lidocaine cream, inhaler, and nystatin powder for safekeeping. Inquired whether patient was referring to hospital provided inhaler to which patient stated yes. This RN informed the patient that we are not able to give the inhaler to the patient. Patient then stated that the nystatin and the lidocaine cream are her own home medications and that she would like them back. Requests to speak to the Charge RN.

## 2020-06-22 NOTE — CARE PLAN
Problem: Communication  Goal: The ability to communicate needs accurately and effectively will improve  6/21/2020 2137 by Alayna Osorio R.N.  Outcome: PROGRESSING AS EXPECTED  6/21/2020 2137 by Alayna Osorio R.N.  Reactivated     Problem: Safety  Goal: Will remain free from injury  6/21/2020 2137 by Alayna Osorio R.N.  Outcome: PROGRESSING AS EXPECTED  6/21/2020 2137 by Alayna Osorio R.N.  Reactivated  Goal: Will remain free from falls  6/21/2020 2137 by Alayna Osorio R.N.  Outcome: PROGRESSING AS EXPECTED  6/21/2020 2137 by Alayna Osorio R.N.  Reactivated     Problem: Knowledge Deficit  Goal: Knowledge of disease process/condition, treatment plan, diagnostic tests, and medications will improve  6/21/2020 2137 by Alayna Osorio R.N.  Outcome: PROGRESSING AS EXPECTED  6/21/2020 2137 by Alayna Osorio R.N.  Reactivated

## 2020-06-22 NOTE — PROGRESS NOTES
Called down to [facility] and spoke to Rosita regarding patient's medications. Per Rosita, spoke to ER Charge who stated that they do not have the medications and if they did, the medications would have been sent to the pharmacy already.

## 2020-06-22 NOTE — RESPIRATORY CARE
Oxygen Rounds      Patient found on    O2 L/m:  __4_______    Oxygen device:  ____nc____   Spo2: ____97_____%        Respiratory device skin site inspection completed.

## 2020-06-22 NOTE — PROGRESS NOTES
Received bedside report from RN, pt care assumed, VSS, pt assessment complete. Pt AAOx4 with complaint of mild headache, see MAR. No signs of acute distress noted at this time. On 4L O2 nasal cannula. POC discussed with pt and verbalizes no questions. Pt denies any additional needs at this time. Bed in lowest position, pt educated on fall risk and verbalized understanding, call light within reach, hourly rounding initiated.

## 2020-06-22 NOTE — DISCHARGE SUMMARY
Discharge Summary    CHIEF COMPLAINT ON ADMISSION  Chief Complaint   Patient presents with   • Shortness of Breath       Reason for Admission  EMS     Admission Date  6/15/2020    CODE STATUS  Full Code    HPI & HOSPITAL COURSE  This is a 69 y.o. female here with PMH COPD, CHF, HTN, chronic home O2, CKD IV, HLD, tobacco use who presented with weakness and cough and admitted for COPD exacerbation. CXR showed mild pulm edema. COVID PCR was negative. She also had hyperkalemia and pulm edema that improved with lasix. Her wheezing improved.   Cardiology was consulted for elevated troponin.   TTE showed EF 65%, moderate mitral stenosis, mild aortic stenosis. She had difficult to control hypertension and started on norvasc, metop and hydralazine, but will remain off of lisinopril because of hyperkalemia. Renal artery US did not show stenosis. Her HTN had improved control will need further medication titration as outpatient. Cardiology recommends outpatient follow up, but will need a referral from her PCP.      Therefore, she is discharged in good and stable condition to home with organized home healthcare and close outpatient follow-up.    The patient met 2-midnight criteria for an inpatient stay at the time of discharge.    Discharge Date  6/22/2020    FOLLOW UP ITEMS POST DISCHARGE  Started on new HTN medications, needs close monitoring  Needs referral to [facility] to follow up with them    DISCHARGE DIAGNOSES  Principal Problem:    Chronic obstructive pulmonary disease with acute exacerbation (HCC) POA: Yes      Overview: On oxygen 3L nocturnal  Active Problems:    DM type 2, uncontrolled, with renal complications (HCC) (Chronic) POA: Yes    Mixed anemia POA: Yes    CKD (chronic kidney disease), stage IV (HCC) POA: Yes    Acute on chronic diastolic congestive heart failure (HCC) POA: Yes    Non-rheumatic mitral valve stenosis POA: Yes    Chronic respiratory failure with hypoxia (HCC) POA: Yes    Essential  hypertension POA: Yes    Tobacco dependence POA: Yes    Dyslipidemia POA: Yes    Obesity (BMI 30-39.9) POA: Yes  Resolved Problems:    Hyperkalemia POA: Yes    ACP (advance care planning) POA: Unknown    Elevated troponin POA: Unknown      FOLLOW UP  Crystal Ramos M.D.  [address]  [place] [address]  [phone]    Go to  You are scheduled for a hospital follow up for 6/26/20 at 10:15am. Please obtain a referral to Cardiology.Thank you.     [facility]  [address] [id number] [phone]          MEDICATIONS ON DISCHARGE     Medication List      START taking these medications      Instructions   amLODIPine 10 MG Tabs  Commonly known as:  NORVASC   Take 1 Tab by mouth every day.  Dose:  10 mg     furosemide 40 MG Tabs  Commonly known as:  LASIX   Take 1 Tab by mouth every day.  Dose:  40 mg     hydrALAZINE 25 MG Tabs  Commonly known as:  APRESOLINE   Take 1 Tab by mouth 3 times a day.  Dose:  25 mg     hydrOXYzine HCl 25 MG Tabs  Commonly known as:  ATARAX   Take 1 Tab by mouth 3 times a day as needed for Anxiety.  Dose:  25 mg     metoprolol 25 MG Tabs  Commonly known as:  LOPRESSOR   Take 1 Tab by mouth 2 Times a Day.  Dose:  25 mg        CONTINUE taking these medications      Instructions   albuterol 108 (90 Base) MCG/ACT Aers inhalation aerosol   Inhale 2 Puffs by mouth every four hours as needed for Shortness of Breath.  Dose:  2 Puff     amitriptyline 50 MG Tabs  Commonly known as:  ELAVIL   Take 100 mg by mouth every evening.  Dose:  100 mg     cyclobenzaprine 10 MG Tabs  Commonly known as:  FLEXERIL   Take 1 Tab by mouth every bedtime.  Dose:  10 mg     Diclofenac Sodium 1 % Gel   Apply 2 g to skin as directed 2 times a day as needed (for pain).  Dose:  2 g     DULoxetine 60 MG Cpep delayed-release capsule  Commonly known as:  CYMBALTA   TAKE 1 CAPSULE BY MOUTH DAILY     insulin glargine 100 UNIT/ML Soln  Commonly known as:  LANTUS   Inject 20 Units as  "instructed every evening.  Dose:  20 Units     ipratropium-albuterol 0.5-2.5 (3) MG/3ML nebulizer solution  Commonly known as:  DUONEB   3 mL by Nebulization route every 6 hours as needed for Shortness of Breath.  Dose:  3 mL     lactulose 10 GM/15ML Soln   Take 20 g by mouth 2 times a day as needed.  Dose:  20 g     levothyroxine 75 MCG Tabs  Commonly known as:  SYNTHROID   TAKE 1 TABLET BY MOUTH DAILY     lidocaine 5 % Oint  Commonly known as:  XYLOCAINE   Apply 1 Each to affected area(s) as needed.  Dose:  1 Each     nystatin/triamcinolone 961257-4.1 UNIT/GM-% Crea  Commonly known as:  MYCOLOG   Doctor's comments:  Pt to make appt regarding this medication for refills.  APPLY A THIN LAYER TO FUNGAL RASH ONCE DAILY AS NEEDED     oxyCODONE immediate release 10 MG immediate release tablet  Commonly known as:  ROXICODONE   Take 10 mg by mouth 3 times a day as needed for Moderate Pain.  Dose:  10 mg     rosuvastatin 10 MG Tabs  Commonly known as:  CRESTOR   TAKE 1 TABLET BY MOUTH EVERY EVENING     traZODone 150 MG Tabs  Commonly known as:  DESYREL   Take 300 mg by mouth 2 times a day.  Dose:  300 mg     Trelegy Ellipta 100-62.5-25 MCG/INH Aepb  Generic drug:  Fluticasone-Umeclidin-Vilant   INHALE 1 PUFF BY MOUTH DAILY        STOP taking these medications    lisinopril 5 MG Tabs  Commonly known as:  PRINIVIL            Allergies  Allergies   Allergen Reactions   • Doxycycline Itching   • Vitamin C Diarrhea     \"violent diarrhea\"   • Codeine Nausea     Severe stomach pain   • Gabapentin Palpitations     Gets shaky and falls    • Keflex Rash     Severe rash, but TOLERATES PENICILLINS, Rocephin    • Lyrica Nausea     Nausea, dizzy   • Powders    • Sudafed Nausea and Unspecified     Chest pain, nausea       DIET  Orders Placed This Encounter   Procedures   • Diet Order Renal     Standing Status:   Standing     Number of Occurrences:   1     Order Specific Question:   Diet:     Answer:   Renal [8]       ACTIVITY  As " tolerated.  Weight bearing as tolerated    CONSULTATIONS  Cardiology    PROCEDURES  US-RENAL ARTERY DUPLEX COMP   Final Result      CT-CHEST (THORAX) W/O   Final Result         1. No acute abnormality in the chest.   2. Mild subcarinal lymphadenopathy, most likely reactive. It has improved since prior study.   3. Early emphysema.         EC-ECHOCARDIOGRAM COMPLETE W/O CONT   Final Result      DX-CHEST-PORTABLE (1 VIEW)   Final Result         Diffuse interstitial prominence could relate to mild pulmonary edema.      Stable cardiomegaly.            LABORATORY  Lab Results   Component Value Date    SODIUM 135 06/20/2020    POTASSIUM 5.2 06/20/2020    CHLORIDE 99 06/20/2020    CO2 27 06/20/2020    GLUCOSE 97 06/20/2020    BUN 65 (H) 06/20/2020    CREATININE 2.06 (H) 06/20/2020        Lab Results   Component Value Date    WBC 13.4 (H) 06/20/2020    HEMOGLOBIN 9.1 (L) 06/20/2020    HEMATOCRIT 29.0 (L) 06/20/2020    PLATELETCT 249 06/20/2020        Total time of the discharge process exceeds 35 minutes.

## 2020-06-22 NOTE — PROGRESS NOTES
"Patient explained/educated on discharge instructions. Patient notified this RN that [facility] are able to send transport when we are ready. Notified patient to have transport be sent. Patient informed this RN that she has inhalers held for safekeeping in the [facility]. This RN called the main pharmacy. Informed this RN that patient does not have any medications stored downstairs. Also saw that \"no\" was clicked to having home medications. Will discuss with patient.  "

## 2020-06-25 NOTE — PROGRESS NOTES
"Hospital Medicine Progress Note, Adult, Complex               Author: Jm Trejo Date & Time created: 4/26/2017  8:36 AM     Interval History:  65yo with multiple medical problems (COPD, CKD III, HFPLVEF, DM, HTN) found down at home altered and hypoxic.  Admitted with Dx of HCAP, sepsis     \"I'm doing a lot better today\".  Pt states she is less SOB, her cough has decreased as well.  Able to get up to the bedside this am with no issues.  O2 demand is down as well.    Off pressors other then Hydrocortisone.    Review of Systems:  Review of Systems   Constitutional: Negative for fever and chills.   Respiratory: Positive for cough and shortness of breath.    Cardiovascular: Negative for chest pain.   Gastrointestinal: Negative for nausea, vomiting, abdominal pain and diarrhea.   Musculoskeletal: Positive for back pain.   Skin: Negative for rash.   Neurological: Negative for dizziness, loss of consciousness and headaches.       Physical Exam:  Physical Exam   Constitutional: She is oriented to person, place, and time. She appears well-developed and well-nourished. No distress.   HENT:   Head: Normocephalic and atraumatic.   Neck: No JVD present.   Cardiovascular: Normal rate and regular rhythm.    Murmur heard.  Pulmonary/Chest: Effort normal. No stridor. No respiratory distress. She has no wheezes. She has rales.   Abdominal: Soft. There is no tenderness. There is no rebound and no guarding.   Musculoskeletal: She exhibits edema.   Trace to 1+ edema   Neurological: She is oriented to person, place, and time.   Skin: Skin is warm and dry. No rash noted. She is not diaphoretic.   Psychiatric: She has a normal mood and affect. Thought content normal.   Nursing note and vitals reviewed.      Labs:  Recent Labs      04/24/17   2300   [id number]  7.37*   [id number]  41.6*   [id number]  130.9*   [id number]  98.0   ARTHCO3  23   ARTBE  -2     Recent Labs      04/24/17   2211  04/25/17   0415  04/25/17   0845   CKMB  " 5.8*   --    --    TROPONINI  0.09*  0.30*  0.33*   BNPBTYPENAT  308*   --    --      Recent Labs      17   0510   SODIUM  136  137  138   POTASSIUM  5.8*  4.9  4.7   CHLORIDE  104  110  109   CO2     BUN  40*  32*  32*   CREATININE  1.96*  1.52*  1.43*   CALCIUM  8.9  7.7*  8.5     Recent Labs      17   0510   ALTSGPT  18   --   19   ASTSGOT  20   --   13   ALKPHOSPHAT  66   --   55   TBILIRUBIN  0.7   --   0.6   GLUCOSE  205*  234*  202*     Recent Labs      17   0845  17   0510   RBC  2.83*   --   2.22*   HEMOGLOBIN  8.0*   --   6.3*   HEMATOCRIT  26.2*   --   20.5*   PLATELETCT  216   --   129*   PROTHROMBTM  14.3   --    --    INR  1.08   --    --    IRON   --   10*   --    TOTIRONBC   --   259   --      Recent Labs      17   0510   WBC  12.1*  9.4   NEUTSPOLYS  83.30*  89.40*   LYMPHOCYTES  7.90*  5.30*   MONOCYTES  1.80  3.50   EOSINOPHILS  0.90  0.00   BASOPHILS  1.70  0.00   ASTSGOT  20  13   ALTSGPT  18  19   ALKPHOSPHAT  66  55   TBILIRUBIN  0.7  0.6           Hemodynamics:  Temp (24hrs), Av °C (98.6 °F), Min:36.5 °C (97.7 °F), Max:37.4 °C (99.4 °F)  Temperature: 36.5 °C (97.7 °F)  Pulse  Av.9  Min: 62  Max: 92Heart Rate (Monitored): 66  NIBP: 121/76 mmHg     Respiratory:    Respiration: 18, Pulse Oximetry: 98 %, O2 Daily Delivery Respiratory : Silicone Nasal Cannula     Given By:: Mouthpiece, #MDI/DPI Given: MDI/DPI x 2, PEP/CPT Method: Positive Airway Pressure Device, Work Of Breathing / Effort: Mild  RUL Breath Sounds: Diminished, RML Breath Sounds: Diminished, RLL Breath Sounds: Diminished, BIRD Breath Sounds: Diminished, LLL Breath Sounds: Diminished  Fluids:    Intake/Output Summary (Last 24 hours) at 17 0836  Last data filed at 17 0600   Gross per 24 hour   Intake 3134.74 ml   Output   2520 ml   Net 614.74 ml     Weight: 102.8 kg (226 lb  10.1 oz)  GI/Nutrition:  Orders Placed This Encounter   Procedures   • Diet Order     Standing Status: Standing      Number of Occurrences: 1      Standing Expiration Date:      Order Specific Question:  Diet:     Answer:  Diabetic [3]     Medical Decision Making, by Problem:  Active Hospital Problems    Diagnosis   • Hypertensive heart disease with heart failure (CMS-HCC) [I11.0]  GD II diastolic dysfunction with preserved LVEF  On ACEI, BB as outpt; holding due to sepsis  Follow volume status closely   • HCAP (healthcare-associated pneumonia) [J18.9]  Abx's as noted below   • COPD (chronic obstructive pulmonary disease) (CMS-HCC) [J44.9]  Baseline 3.5 litres at home   • DM type 2, uncontrolled, with renal complications (CMS-HCC) [E11.29, E11.65]  7.9 A1c earlier this year  On lantus 20u and SSI as outpt  Cont SSI  Using NPH in house: increase to 15u BID  Coming down on Hydrocortisone  Follow and titrate   • Iron deficiency anemia [D50.9]  Repeat Fe studies  Give one unit PRBC's  Check Retic count   • HTN (hypertension) [I10]  Not an active issue  On HCTZ 25, amlodipine 10, coreg 3.125, lisinopril 40 as outpt   • Sepsis (CMS-HCC) [A41.9]  Respiratory source  Recent health care exposure vs aspiration  SLP eval  Zosyn  MRSA swab neg so dc'd Vanc  Follow cultures  Off pressors  Start steroid taper   • Elevated troponin [R79.89]  No significant EKG changes  Likely demand ischemia  Pt is ASx'c  Cont to follow   • EDITH (obstructive sleep apnea) [G47.33]   • Chronic kidney disease, stage 3 [N18.3]  HAN resolved with IVF's  Back to her baseline Creat  Cont to follow daily  Avoid Nephrotoxins     DW ICU staff    Medications reviewed, EKG reviewed, Labs reviewed and Radiology images reviewed        DVT Prophylaxis: Heparin  DVT prophylaxis - mechanical: SCDs  Ulcer prophylaxis: Yes  Antibiotics: Treating active infection/contamination beyond 24 hours perioperative coverage           Improving

## 2020-06-30 ENCOUNTER — APPOINTMENT (OUTPATIENT)
Dept: CARDIOLOGY | Facility: MEDICAL CENTER | Age: 70
DRG: 291 | End: 2020-06-30
Attending: INTERNAL MEDICINE
Payer: MEDICARE

## 2020-06-30 ENCOUNTER — APPOINTMENT (OUTPATIENT)
Dept: RADIOLOGY | Facility: MEDICAL CENTER | Age: 70
DRG: 291 | End: 2020-06-30
Attending: INTERNAL MEDICINE
Payer: MEDICARE

## 2020-06-30 ENCOUNTER — HOSPITAL ENCOUNTER (INPATIENT)
Facility: MEDICAL CENTER | Age: 70
LOS: 22 days | DRG: 291 | End: 2020-07-22
Attending: EMERGENCY MEDICINE | Admitting: INTERNAL MEDICINE
Payer: MEDICARE

## 2020-06-30 ENCOUNTER — APPOINTMENT (OUTPATIENT)
Dept: RADIOLOGY | Facility: MEDICAL CENTER | Age: 70
DRG: 291 | End: 2020-06-30
Attending: EMERGENCY MEDICINE
Payer: MEDICARE

## 2020-06-30 DIAGNOSIS — I49.49 JUNCTIONAL ESCAPE RHYTHM: ICD-10-CM

## 2020-06-30 DIAGNOSIS — J44.1 ACUTE EXACERBATION OF CHRONIC OBSTRUCTIVE PULMONARY DISEASE (COPD) (HCC): ICD-10-CM

## 2020-06-30 DIAGNOSIS — R53.81 DEBILITY: ICD-10-CM

## 2020-06-30 DIAGNOSIS — J44.1 CHRONIC OBSTRUCTIVE PULMONARY DISEASE WITH ACUTE EXACERBATION (HCC): ICD-10-CM

## 2020-06-30 DIAGNOSIS — R00.1 BRADYCARDIA: ICD-10-CM

## 2020-06-30 PROBLEM — N17.9 ACUTE RENAL FAILURE SUPERIMPOSED ON STAGE 4 CHRONIC KIDNEY DISEASE (HCC): Status: ACTIVE | Noted: 2017-04-04

## 2020-06-30 PROBLEM — J96.21 ACUTE ON CHRONIC RESPIRATORY FAILURE WITH HYPOXIA (HCC): Status: ACTIVE | Noted: 2019-08-15

## 2020-06-30 LAB
ABO GROUP BLD: ABNORMAL
ACTION RANGE TRIGGERED IACRT: YES
ALBUMIN SERPL BCP-MCNC: 3.2 G/DL (ref 3.2–4.9)
ALBUMIN/GLOB SERPL: 1 G/DL
ALP SERPL-CCNC: 107 U/L (ref 30–99)
ALT SERPL-CCNC: 356 U/L (ref 2–50)
ANION GAP SERPL CALC-SCNC: 16 MMOL/L (ref 7–16)
ANION GAP SERPL CALC-SCNC: 18 MMOL/L (ref 7–16)
ANION GAP SERPL CALC-SCNC: 18 MMOL/L (ref 7–16)
AST SERPL-CCNC: 290 U/L (ref 12–45)
BARCODED ABORH UBTYP: 5100
BARCODED PRD CODE UBPRD: NORMAL
BARCODED UNIT NUM UBUNT: NORMAL
BASE EXCESS BLDA CALC-SCNC: -7 MMOL/L (ref -4–3)
BASOPHILS # BLD AUTO: 0.2 % (ref 0–1.8)
BASOPHILS # BLD AUTO: 0.2 % (ref 0–1.8)
BASOPHILS # BLD: 0.03 K/UL (ref 0–0.12)
BASOPHILS # BLD: 0.03 K/UL (ref 0–0.12)
BILIRUB SERPL-MCNC: 0.2 MG/DL (ref 0.1–1.5)
BLD GP AB SCN SERPL QL: ABNORMAL
BODY TEMPERATURE: ABNORMAL DEGREES
BUN SERPL-MCNC: 62 MG/DL (ref 8–22)
BUN SERPL-MCNC: 97 MG/DL (ref 8–22)
BUN SERPL-MCNC: 97 MG/DL (ref 8–22)
CA-I BLD ISE-SCNC: 1.12 MMOL/L (ref 1.1–1.3)
CALCIUM SERPL-MCNC: 8.4 MG/DL (ref 8.5–10.5)
CALCIUM SERPL-MCNC: 8.8 MG/DL (ref 8.5–10.5)
CALCIUM SERPL-MCNC: 9 MG/DL (ref 8.5–10.5)
CHLORIDE SERPL-SCNC: 89 MMOL/L (ref 96–112)
CHLORIDE SERPL-SCNC: 90 MMOL/L (ref 96–112)
CHLORIDE SERPL-SCNC: 91 MMOL/L (ref 96–112)
CO2 BLDA-SCNC: 21 MMOL/L (ref 20–33)
CO2 SERPL-SCNC: 18 MMOL/L (ref 20–33)
CO2 SERPL-SCNC: 19 MMOL/L (ref 20–33)
CO2 SERPL-SCNC: 23 MMOL/L (ref 20–33)
COMPONENT R 8504R: NORMAL
CORTIS SERPL-MCNC: 35.1 UG/DL (ref 0–23)
COVID ORDER STATUS COVID19: NORMAL
CREAT SERPL-MCNC: 3.76 MG/DL (ref 0.5–1.4)
CREAT SERPL-MCNC: 5.65 MG/DL (ref 0.5–1.4)
CREAT SERPL-MCNC: 5.7 MG/DL (ref 0.5–1.4)
EKG IMPRESSION: NORMAL
EOSINOPHIL # BLD AUTO: 0.01 K/UL (ref 0–0.51)
EOSINOPHIL # BLD AUTO: 0.04 K/UL (ref 0–0.51)
EOSINOPHIL NFR BLD: 0.1 % (ref 0–6.9)
EOSINOPHIL NFR BLD: 0.3 % (ref 0–6.9)
ERYTHROCYTE [DISTWIDTH] IN BLOOD BY AUTOMATED COUNT: 50.4 FL (ref 35.9–50)
ERYTHROCYTE [DISTWIDTH] IN BLOOD BY AUTOMATED COUNT: 52.5 FL (ref 35.9–50)
GLOBULIN SER CALC-MCNC: 3.1 G/DL (ref 1.9–3.5)
GLUCOSE BLD-MCNC: 199 MG/DL (ref 65–99)
GLUCOSE SERPL-MCNC: 114 MG/DL (ref 65–99)
GLUCOSE SERPL-MCNC: 200 MG/DL (ref 65–99)
GLUCOSE SERPL-MCNC: 221 MG/DL (ref 65–99)
HAV IGM SERPL QL IA: ABNORMAL
HBV CORE IGM SER QL: ABNORMAL
HBV SURFACE AB SERPL IA-ACNC: <3.5 MIU/ML (ref 0–10)
HBV SURFACE AG SER QL: ABNORMAL
HCO3 BLDA-SCNC: 19.6 MMOL/L (ref 17–25)
HCT VFR BLD AUTO: 22.3 % (ref 37–47)
HCT VFR BLD AUTO: 23.6 % (ref 37–47)
HCT VFR BLD CALC: 20 % (ref 37–47)
HCV AB SER QL: REACTIVE
HGB BLD-MCNC: 6.8 G/DL (ref 12–16)
HGB BLD-MCNC: 6.9 G/DL (ref 12–16)
HGB BLD-MCNC: 7.3 G/DL (ref 12–16)
HOROWITZ INDEX BLDA+IHG-RTO: 195 MM[HG]
IMM GRANULOCYTES # BLD AUTO: 0.15 K/UL (ref 0–0.11)
IMM GRANULOCYTES # BLD AUTO: 0.36 K/UL (ref 0–0.11)
IMM GRANULOCYTES NFR BLD AUTO: 1.1 % (ref 0–0.9)
IMM GRANULOCYTES NFR BLD AUTO: 2.1 % (ref 0–0.9)
INR PPP: 1.14 (ref 0.87–1.13)
INST. QUALIFIED PATIENT IIQPT: YES
LACTATE BLD-SCNC: 1.3 MMOL/L (ref 0.5–2)
LACTATE BLD-SCNC: 2 MMOL/L (ref 0.5–2)
LACTATE BLD-SCNC: 3.1 MMOL/L (ref 0.5–2)
LYMPHOCYTES # BLD AUTO: 0.37 K/UL (ref 1–4.8)
LYMPHOCYTES # BLD AUTO: 0.71 K/UL (ref 1–4.8)
LYMPHOCYTES NFR BLD: 2.2 % (ref 22–41)
LYMPHOCYTES NFR BLD: 5.2 % (ref 22–41)
MAGNESIUM SERPL-MCNC: 2.8 MG/DL (ref 1.5–2.5)
MCH RBC QN AUTO: 29.9 PG (ref 27–33)
MCH RBC QN AUTO: 30.1 PG (ref 27–33)
MCHC RBC AUTO-ENTMCNC: 30.6 G/DL (ref 33.6–35)
MCHC RBC AUTO-ENTMCNC: 30.9 G/DL (ref 33.6–35)
MCV RBC AUTO: 96.5 FL (ref 81.4–97.8)
MCV RBC AUTO: 98.3 FL (ref 81.4–97.8)
MONOCYTES # BLD AUTO: 0.16 K/UL (ref 0–0.85)
MONOCYTES # BLD AUTO: 0.85 K/UL (ref 0–0.85)
MONOCYTES NFR BLD AUTO: 0.9 % (ref 0–13.4)
MONOCYTES NFR BLD AUTO: 6.2 % (ref 0–13.4)
NEUTROPHILS # BLD AUTO: 11.91 K/UL (ref 2–7.15)
NEUTROPHILS # BLD AUTO: 15.95 K/UL (ref 2–7.15)
NEUTROPHILS NFR BLD: 87 % (ref 44–72)
NEUTROPHILS NFR BLD: 94.5 % (ref 44–72)
NRBC # BLD AUTO: 0 K/UL
NRBC # BLD AUTO: 0 K/UL
NRBC BLD-RTO: 0 /100 WBC
NRBC BLD-RTO: 0 /100 WBC
NT-PROBNP SERPL IA-MCNC: 7661 PG/ML (ref 0–125)
O2/TOTAL GAS SETTING VFR VENT: 37 %
PCO2 BLDA: 45 MMHG (ref 26–37)
PCO2 TEMP ADJ BLDA: 43.2 MMHG (ref 26–37)
PH BLDA: 7.25 [PH] (ref 7.4–7.5)
PH TEMP ADJ BLDA: 7.26 [PH] (ref 7.4–7.5)
PHOSPHATE SERPL-MCNC: 9.1 MG/DL (ref 2.5–4.5)
PLATELET # BLD AUTO: 251 K/UL (ref 164–446)
PLATELET # BLD AUTO: 310 K/UL (ref 164–446)
PMV BLD AUTO: 10.2 FL (ref 9–12.9)
PMV BLD AUTO: 9.9 FL (ref 9–12.9)
PO2 BLDA: 72 MMHG (ref 64–87)
PO2 TEMP ADJ BLDA: 67 MMHG (ref 64–87)
POTASSIUM BLD-SCNC: 5.8 MMOL/L (ref 3.6–5.5)
POTASSIUM SERPL-SCNC: 5.3 MMOL/L (ref 3.6–5.5)
POTASSIUM SERPL-SCNC: 6 MMOL/L (ref 3.6–5.5)
POTASSIUM SERPL-SCNC: 6.3 MMOL/L (ref 3.6–5.5)
PROCALCITONIN SERPL-MCNC: 0.23 NG/ML
PROCALCITONIN SERPL-MCNC: 0.29 NG/ML
PRODUCT TYPE UPROD: NORMAL
PROT SERPL-MCNC: 6.3 G/DL (ref 6–8.2)
PROTHROMBIN TIME: 15 SEC (ref 12–14.6)
RBC # BLD AUTO: 2.31 M/UL (ref 4.2–5.4)
RBC # BLD AUTO: 2.39 M/UL (ref 4.2–5.4)
RH BLD: ABNORMAL
SAO2 % BLDA: 91 % (ref 93–99)
SARS-COV-2 RNA RESP QL NAA+PROBE: NOTDETECTED
SODIUM BLD-SCNC: 125 MMOL/L (ref 135–145)
SODIUM SERPL-SCNC: 126 MMOL/L (ref 135–145)
SODIUM SERPL-SCNC: 126 MMOL/L (ref 135–145)
SODIUM SERPL-SCNC: 130 MMOL/L (ref 135–145)
SPECIMEN DRAWN FROM PATIENT: ABNORMAL
SPECIMEN SOURCE: NORMAL
TROPONIN T SERPL-MCNC: 107 NG/L (ref 6–19)
TROPONIN T SERPL-MCNC: 78 NG/L (ref 6–19)
TSH SERPL DL<=0.005 MIU/L-ACNC: 2.49 UIU/ML (ref 0.38–5.33)
UNIT STATUS USTAT: NORMAL
WBC # BLD AUTO: 13.7 K/UL (ref 4.8–10.8)
WBC # BLD AUTO: 16.9 K/UL (ref 4.8–10.8)

## 2020-06-30 PROCEDURE — 93010 ELECTROCARDIOGRAM REPORT: CPT | Performed by: INTERNAL MEDICINE

## 2020-06-30 PROCEDURE — 96365 THER/PROPH/DIAG IV INF INIT: CPT

## 2020-06-30 PROCEDURE — 80048 BASIC METABOLIC PNL TOTAL CA: CPT

## 2020-06-30 PROCEDURE — 02HV33Z INSERTION OF INFUSION DEVICE INTO SUPERIOR VENA CAVA, PERCUTANEOUS APPROACH: ICD-10-PCS | Performed by: INTERNAL MEDICINE

## 2020-06-30 PROCEDURE — 86850 RBC ANTIBODY SCREEN: CPT

## 2020-06-30 PROCEDURE — 86901 BLOOD TYPING SEROLOGIC RH(D): CPT

## 2020-06-30 PROCEDURE — 94640 AIRWAY INHALATION TREATMENT: CPT

## 2020-06-30 PROCEDURE — 80074 ACUTE HEPATITIS PANEL: CPT

## 2020-06-30 PROCEDURE — A9270 NON-COVERED ITEM OR SERVICE: HCPCS | Performed by: INTERNAL MEDICINE

## 2020-06-30 PROCEDURE — 99291 CRITICAL CARE FIRST HOUR: CPT

## 2020-06-30 PROCEDURE — 84132 ASSAY OF SERUM POTASSIUM: CPT

## 2020-06-30 PROCEDURE — 36620 INSERTION CATHETER ARTERY: CPT

## 2020-06-30 PROCEDURE — 700101 HCHG RX REV CODE 250: Performed by: EMERGENCY MEDICINE

## 2020-06-30 PROCEDURE — 83880 ASSAY OF NATRIURETIC PEPTIDE: CPT

## 2020-06-30 PROCEDURE — 700111 HCHG RX REV CODE 636 W/ 250 OVERRIDE (IP): Performed by: INTERNAL MEDICINE

## 2020-06-30 PROCEDURE — 83735 ASSAY OF MAGNESIUM: CPT

## 2020-06-30 PROCEDURE — 700102 HCHG RX REV CODE 250 W/ 637 OVERRIDE(OP): Performed by: EMERGENCY MEDICINE

## 2020-06-30 PROCEDURE — 99223 1ST HOSP IP/OBS HIGH 75: CPT | Performed by: INTERNAL MEDICINE

## 2020-06-30 PROCEDURE — 85025 COMPLETE CBC W/AUTO DIFF WBC: CPT | Mod: 91

## 2020-06-30 PROCEDURE — 700102 HCHG RX REV CODE 250 W/ 637 OVERRIDE(OP): Performed by: INTERNAL MEDICINE

## 2020-06-30 PROCEDURE — 96367 TX/PROPH/DG ADDL SEQ IV INF: CPT

## 2020-06-30 PROCEDURE — 85014 HEMATOCRIT: CPT

## 2020-06-30 PROCEDURE — 30233N1 TRANSFUSION OF NONAUTOLOGOUS RED BLOOD CELLS INTO PERIPHERAL VEIN, PERCUTANEOUS APPROACH: ICD-10-PCS | Performed by: INTERNAL MEDICINE

## 2020-06-30 PROCEDURE — 93005 ELECTROCARDIOGRAM TRACING: CPT | Performed by: INTERNAL MEDICINE

## 2020-06-30 PROCEDURE — 86706 HEP B SURFACE ANTIBODY: CPT

## 2020-06-30 PROCEDURE — 84484 ASSAY OF TROPONIN QUANT: CPT

## 2020-06-30 PROCEDURE — 96375 TX/PRO/DX INJ NEW DRUG ADDON: CPT

## 2020-06-30 PROCEDURE — 36556 INSERT NON-TUNNEL CV CATH: CPT

## 2020-06-30 PROCEDURE — 700111 HCHG RX REV CODE 636 W/ 250 OVERRIDE (IP)

## 2020-06-30 PROCEDURE — 03HY32Z INSERTION OF MONITORING DEVICE INTO UPPER ARTERY, PERCUTANEOUS APPROACH: ICD-10-PCS | Performed by: INTERNAL MEDICINE

## 2020-06-30 PROCEDURE — 700101 HCHG RX REV CODE 250: Performed by: INTERNAL MEDICINE

## 2020-06-30 PROCEDURE — 86923 COMPATIBILITY TEST ELECTRIC: CPT

## 2020-06-30 PROCEDURE — 71045 X-RAY EXAM CHEST 1 VIEW: CPT

## 2020-06-30 PROCEDURE — 36620 INSERTION CATHETER ARTERY: CPT | Mod: GC | Performed by: INTERNAL MEDICINE

## 2020-06-30 PROCEDURE — 82330 ASSAY OF CALCIUM: CPT

## 2020-06-30 PROCEDURE — 80053 COMPREHEN METABOLIC PANEL: CPT

## 2020-06-30 PROCEDURE — 82962 GLUCOSE BLOOD TEST: CPT

## 2020-06-30 PROCEDURE — 84295 ASSAY OF SERUM SODIUM: CPT

## 2020-06-30 PROCEDURE — 84100 ASSAY OF PHOSPHORUS: CPT

## 2020-06-30 PROCEDURE — 700105 HCHG RX REV CODE 258

## 2020-06-30 PROCEDURE — 99291 CRITICAL CARE FIRST HOUR: CPT | Performed by: INTERNAL MEDICINE

## 2020-06-30 PROCEDURE — 5A1D70Z PERFORMANCE OF URINARY FILTRATION, INTERMITTENT, LESS THAN 6 HOURS PER DAY: ICD-10-PCS | Performed by: INTERNAL MEDICINE

## 2020-06-30 PROCEDURE — 700105 HCHG RX REV CODE 258: Performed by: INTERNAL MEDICINE

## 2020-06-30 PROCEDURE — 94660 CPAP INITIATION&MGMT: CPT

## 2020-06-30 PROCEDURE — 99292 CRITICAL CARE ADDL 30 MIN: CPT | Performed by: INTERNAL MEDICINE

## 2020-06-30 PROCEDURE — 99292 CRITICAL CARE ADDL 30 MIN: CPT | Mod: 25 | Performed by: INTERNAL MEDICINE

## 2020-06-30 PROCEDURE — 306379 HCHG ARTERIAL LINE, 18G

## 2020-06-30 PROCEDURE — 93005 ELECTROCARDIOGRAM TRACING: CPT

## 2020-06-30 PROCEDURE — 36556 INSERT NON-TUNNEL CV CATH: CPT | Mod: RT,GC | Performed by: INTERNAL MEDICINE

## 2020-06-30 PROCEDURE — 87522 HEPATITIS C REVRS TRNSCRPJ: CPT

## 2020-06-30 PROCEDURE — 84145 PROCALCITONIN (PCT): CPT

## 2020-06-30 PROCEDURE — 5A09357 ASSISTANCE WITH RESPIRATORY VENTILATION, LESS THAN 24 CONSECUTIVE HOURS, CONTINUOUS POSITIVE AIRWAY PRESSURE: ICD-10-PCS | Performed by: INTERNAL MEDICINE

## 2020-06-30 PROCEDURE — 86900 BLOOD TYPING SEROLOGIC ABO: CPT

## 2020-06-30 PROCEDURE — 83605 ASSAY OF LACTIC ACID: CPT

## 2020-06-30 PROCEDURE — P9047 ALBUMIN (HUMAN), 25%, 50ML: HCPCS | Mod: JG | Performed by: INTERNAL MEDICINE

## 2020-06-30 PROCEDURE — 700105 HCHG RX REV CODE 258: Performed by: EMERGENCY MEDICINE

## 2020-06-30 PROCEDURE — 36415 COLL VENOUS BLD VENIPUNCTURE: CPT

## 2020-06-30 PROCEDURE — 770022 HCHG ROOM/CARE - ICU (200)

## 2020-06-30 PROCEDURE — 82803 BLOOD GASES ANY COMBINATION: CPT

## 2020-06-30 PROCEDURE — 36430 TRANSFUSION BLD/BLD COMPNT: CPT

## 2020-06-30 PROCEDURE — 84443 ASSAY THYROID STIM HORMONE: CPT

## 2020-06-30 PROCEDURE — U0004 COV-19 TEST NON-CDC HGH THRU: HCPCS

## 2020-06-30 PROCEDURE — C9803 HOPD COVID-19 SPEC COLLECT: HCPCS | Performed by: EMERGENCY MEDICINE

## 2020-06-30 PROCEDURE — 87040 BLOOD CULTURE FOR BACTERIA: CPT | Mod: 91

## 2020-06-30 PROCEDURE — 82533 TOTAL CORTISOL: CPT

## 2020-06-30 PROCEDURE — B548ZZA ULTRASONOGRAPHY OF SUPERIOR VENA CAVA, GUIDANCE: ICD-10-PCS | Performed by: INTERNAL MEDICINE

## 2020-06-30 PROCEDURE — P9016 RBC LEUKOCYTES REDUCED: HCPCS

## 2020-06-30 PROCEDURE — 85610 PROTHROMBIN TIME: CPT

## 2020-06-30 PROCEDURE — 90935 HEMODIALYSIS ONE EVALUATION: CPT

## 2020-06-30 PROCEDURE — 700111 HCHG RX REV CODE 636 W/ 250 OVERRIDE (IP): Performed by: EMERGENCY MEDICINE

## 2020-06-30 RX ORDER — CALCIUM CHLORIDE 100 MG/ML
INJECTION INTRAVENOUS; INTRAVENTRICULAR
Status: COMPLETED
Start: 2020-06-30 | End: 2020-06-30

## 2020-06-30 RX ORDER — ONDANSETRON 2 MG/ML
4 INJECTION INTRAMUSCULAR; INTRAVENOUS EVERY 4 HOURS PRN
Status: DISCONTINUED | OUTPATIENT
Start: 2020-06-30 | End: 2020-07-22 | Stop reason: HOSPADM

## 2020-06-30 RX ORDER — AMOXICILLIN 250 MG
2 CAPSULE ORAL 2 TIMES DAILY
Status: DISCONTINUED | OUTPATIENT
Start: 2020-06-30 | End: 2020-07-22 | Stop reason: HOSPADM

## 2020-06-30 RX ORDER — SODIUM CHLORIDE 9 MG/ML
1000 INJECTION, SOLUTION INTRAVENOUS ONCE
Status: COMPLETED | OUTPATIENT
Start: 2020-06-30 | End: 2020-06-30

## 2020-06-30 RX ORDER — MORPHINE SULFATE 4 MG/ML
4 INJECTION, SOLUTION INTRAMUSCULAR; INTRAVENOUS
Status: DISCONTINUED | OUTPATIENT
Start: 2020-06-30 | End: 2020-07-02

## 2020-06-30 RX ORDER — OXYCODONE HYDROCHLORIDE 10 MG/1
10 TABLET ORAL
Status: DISCONTINUED | OUTPATIENT
Start: 2020-06-30 | End: 2020-07-02

## 2020-06-30 RX ORDER — IPRATROPIUM BROMIDE AND ALBUTEROL SULFATE 2.5; .5 MG/3ML; MG/3ML
3 SOLUTION RESPIRATORY (INHALATION)
Status: DISCONTINUED | OUTPATIENT
Start: 2020-06-30 | End: 2020-07-02

## 2020-06-30 RX ORDER — ROSUVASTATIN CALCIUM 20 MG/1
10 TABLET, COATED ORAL EVERY EVENING
Status: DISCONTINUED | OUTPATIENT
Start: 2020-06-30 | End: 2020-07-22 | Stop reason: HOSPADM

## 2020-06-30 RX ORDER — LEVOTHYROXINE SODIUM 0.07 MG/1
75 TABLET ORAL
Status: DISCONTINUED | OUTPATIENT
Start: 2020-06-30 | End: 2020-07-22 | Stop reason: HOSPADM

## 2020-06-30 RX ORDER — TRAZODONE HYDROCHLORIDE 150 MG/1
300 TABLET ORAL 2 TIMES DAILY
Status: DISCONTINUED | OUTPATIENT
Start: 2020-06-30 | End: 2020-07-22 | Stop reason: HOSPADM

## 2020-06-30 RX ORDER — AZITHROMYCIN 500 MG/1
500 INJECTION, POWDER, LYOPHILIZED, FOR SOLUTION INTRAVENOUS ONCE
Status: COMPLETED | OUTPATIENT
Start: 2020-06-30 | End: 2020-06-30

## 2020-06-30 RX ORDER — AZITHROMYCIN 250 MG/1
500 TABLET, FILM COATED ORAL DAILY
Status: COMPLETED | OUTPATIENT
Start: 2020-07-01 | End: 2020-07-02

## 2020-06-30 RX ORDER — LIDOCAINE 50 MG/G
1 PATCH TOPICAL
Status: DISCONTINUED | OUTPATIENT
Start: 2020-06-30 | End: 2020-07-22 | Stop reason: HOSPADM

## 2020-06-30 RX ORDER — HEPARIN SODIUM 5000 [USP'U]/ML
5000 INJECTION, SOLUTION INTRAVENOUS; SUBCUTANEOUS EVERY 8 HOURS
Status: DISCONTINUED | OUTPATIENT
Start: 2020-06-30 | End: 2020-07-16

## 2020-06-30 RX ORDER — DULOXETIN HYDROCHLORIDE 60 MG/1
60 CAPSULE, DELAYED RELEASE ORAL DAILY
Status: DISCONTINUED | OUTPATIENT
Start: 2020-06-30 | End: 2020-07-22 | Stop reason: HOSPADM

## 2020-06-30 RX ORDER — OXYCODONE HYDROCHLORIDE 5 MG/1
5 TABLET ORAL
Status: DISCONTINUED | OUTPATIENT
Start: 2020-06-30 | End: 2020-07-02

## 2020-06-30 RX ORDER — EPINEPHRINE HCL IN 0.9 % NACL 4MG/250ML
0-10 PLASTIC BAG, INJECTION (ML) INTRAVENOUS CONTINUOUS
Status: DISCONTINUED | OUTPATIENT
Start: 2020-06-30 | End: 2020-07-01

## 2020-06-30 RX ORDER — SODIUM CHLORIDE 9 MG/ML
INJECTION, SOLUTION INTRAVENOUS
Status: ACTIVE
Start: 2020-06-30 | End: 2020-07-01

## 2020-06-30 RX ORDER — DEXTROSE MONOHYDRATE 25 G/50ML
50 INJECTION, SOLUTION INTRAVENOUS
Status: DISCONTINUED | OUTPATIENT
Start: 2020-06-30 | End: 2020-07-22 | Stop reason: HOSPADM

## 2020-06-30 RX ORDER — HEPARIN SODIUM 1000 [USP'U]/ML
2400 INJECTION, SOLUTION INTRAVENOUS; SUBCUTANEOUS
Status: DISCONTINUED | OUTPATIENT
Start: 2020-06-30 | End: 2020-07-17

## 2020-06-30 RX ORDER — METHYLPREDNISOLONE SODIUM SUCCINATE 125 MG/2ML
62.5 INJECTION, POWDER, LYOPHILIZED, FOR SOLUTION INTRAMUSCULAR; INTRAVENOUS EVERY 12 HOURS
Status: DISCONTINUED | OUTPATIENT
Start: 2020-06-30 | End: 2020-07-02

## 2020-06-30 RX ORDER — BISACODYL 10 MG
10 SUPPOSITORY, RECTAL RECTAL
Status: DISCONTINUED | OUTPATIENT
Start: 2020-06-30 | End: 2020-07-22 | Stop reason: HOSPADM

## 2020-06-30 RX ORDER — ONDANSETRON 4 MG/1
4 TABLET, ORALLY DISINTEGRATING ORAL EVERY 4 HOURS PRN
Status: DISCONTINUED | OUTPATIENT
Start: 2020-06-30 | End: 2020-07-22 | Stop reason: HOSPADM

## 2020-06-30 RX ORDER — HYDROXYZINE HYDROCHLORIDE 25 MG/1
25 TABLET, FILM COATED ORAL 3 TIMES DAILY PRN
Status: DISCONTINUED | OUTPATIENT
Start: 2020-06-30 | End: 2020-07-22 | Stop reason: HOSPADM

## 2020-06-30 RX ORDER — FLUTICASONE PROPIONATE 44 UG/1
2 AEROSOL, METERED RESPIRATORY (INHALATION) DAILY
Status: DISCONTINUED | OUTPATIENT
Start: 2020-06-30 | End: 2020-07-11

## 2020-06-30 RX ORDER — ALBUTEROL SULFATE 90 UG/1
2 AEROSOL, METERED RESPIRATORY (INHALATION) ONCE
Status: COMPLETED | OUTPATIENT
Start: 2020-06-30 | End: 2020-06-30

## 2020-06-30 RX ORDER — SODIUM CHLORIDE 9 MG/ML
250 INJECTION, SOLUTION INTRAVENOUS
Status: DISCONTINUED | OUTPATIENT
Start: 2020-06-30 | End: 2020-07-22 | Stop reason: HOSPADM

## 2020-06-30 RX ORDER — SODIUM CHLORIDE, SODIUM LACTATE, POTASSIUM CHLORIDE, AND CALCIUM CHLORIDE .6; .31; .03; .02 G/100ML; G/100ML; G/100ML; G/100ML
500 INJECTION, SOLUTION INTRAVENOUS
Status: DISCONTINUED | OUTPATIENT
Start: 2020-06-30 | End: 2020-07-22 | Stop reason: HOSPADM

## 2020-06-30 RX ORDER — SODIUM CHLORIDE 9 MG/ML
INJECTION, SOLUTION INTRAVENOUS
Status: COMPLETED
Start: 2020-06-30 | End: 2020-06-30

## 2020-06-30 RX ORDER — ONDANSETRON 2 MG/ML
4 INJECTION INTRAMUSCULAR; INTRAVENOUS ONCE
Status: COMPLETED | OUTPATIENT
Start: 2020-06-30 | End: 2020-06-30

## 2020-06-30 RX ORDER — PREDNISONE 20 MG/1
60 TABLET ORAL ONCE
Status: COMPLETED | OUTPATIENT
Start: 2020-06-30 | End: 2020-06-30

## 2020-06-30 RX ORDER — ONDANSETRON 2 MG/ML
INJECTION INTRAMUSCULAR; INTRAVENOUS
Status: ACTIVE
Start: 2020-06-30 | End: 2020-07-01

## 2020-06-30 RX ORDER — POLYETHYLENE GLYCOL 3350 17 G/17G
1 POWDER, FOR SOLUTION ORAL
Status: DISCONTINUED | OUTPATIENT
Start: 2020-06-30 | End: 2020-07-22 | Stop reason: HOSPADM

## 2020-06-30 RX ORDER — DOPAMINE HYDROCHLORIDE 160 MG/100ML
0-20 INJECTION, SOLUTION INTRAVENOUS CONTINUOUS
Status: DISCONTINUED | OUTPATIENT
Start: 2020-06-30 | End: 2020-07-01

## 2020-06-30 RX ORDER — IPRATROPIUM BROMIDE AND ALBUTEROL SULFATE 2.5; .5 MG/3ML; MG/3ML
3 SOLUTION RESPIRATORY (INHALATION)
Status: DISCONTINUED | OUTPATIENT
Start: 2020-06-30 | End: 2020-07-17

## 2020-06-30 RX ORDER — DOPAMINE HYDROCHLORIDE 160 MG/100ML
INJECTION, SOLUTION INTRAVENOUS
Status: COMPLETED
Start: 2020-06-30 | End: 2020-06-30

## 2020-06-30 RX ORDER — ALBUMIN (HUMAN) 12.5 G/50ML
12.5 SOLUTION INTRAVENOUS
Status: DISCONTINUED | OUTPATIENT
Start: 2020-06-30 | End: 2020-07-17

## 2020-06-30 RX ORDER — IPRATROPIUM BROMIDE AND ALBUTEROL SULFATE 2.5; .5 MG/3ML; MG/3ML
3 SOLUTION RESPIRATORY (INHALATION)
Status: DISCONTINUED | OUTPATIENT
Start: 2020-06-30 | End: 2020-06-30

## 2020-06-30 RX ORDER — CALCIUM CHLORIDE 100 MG/ML
1 INJECTION INTRAVENOUS; INTRAVENTRICULAR ONCE
Status: COMPLETED | OUTPATIENT
Start: 2020-06-30 | End: 2020-06-30

## 2020-06-30 RX ORDER — DEXTROSE MONOHYDRATE 25 G/50ML
50 INJECTION, SOLUTION INTRAVENOUS ONCE
Status: COMPLETED | OUTPATIENT
Start: 2020-06-30 | End: 2020-06-30

## 2020-06-30 RX ORDER — DEXTROSE MONOHYDRATE 25 G/50ML
50 INJECTION, SOLUTION INTRAVENOUS
Status: DISCONTINUED | OUTPATIENT
Start: 2020-06-30 | End: 2020-06-30

## 2020-06-30 RX ADMIN — TRAZODONE HYDROCHLORIDE 300 MG: 150 TABLET ORAL at 21:59

## 2020-06-30 RX ADMIN — HEPARIN SODIUM 5000 UNITS: 5000 INJECTION, SOLUTION INTRAVENOUS; SUBCUTANEOUS at 21:59

## 2020-06-30 RX ADMIN — NOREPINEPHRINE BITARTRATE 20 MCG/MIN: 1 INJECTION, SOLUTION, CONCENTRATE INTRAVENOUS at 14:52

## 2020-06-30 RX ADMIN — DOPAMINE HYDROCHLORIDE 10 MCG/KG/MIN: 160 INJECTION, SOLUTION INTRAVENOUS at 14:52

## 2020-06-30 RX ADMIN — ALBUTEROL SULFATE 2 PUFF: 90 AEROSOL, METERED RESPIRATORY (INHALATION) at 09:33

## 2020-06-30 RX ADMIN — OXYCODONE 5 MG: 5 TABLET ORAL at 15:41

## 2020-06-30 RX ADMIN — ALBUMIN (HUMAN) 12.5 G: 5 SOLUTION INTRAVENOUS at 15:13

## 2020-06-30 RX ADMIN — SODIUM CHLORIDE 3 G: 900 INJECTION INTRAVENOUS at 10:11

## 2020-06-30 RX ADMIN — METHYLPREDNISOLONE SODIUM SUCCINATE 62.5 MG: 125 INJECTION, POWDER, FOR SOLUTION INTRAMUSCULAR; INTRAVENOUS at 14:06

## 2020-06-30 RX ADMIN — AZITHROMYCIN MONOHYDRATE 500 MG: 500 INJECTION, POWDER, LYOPHILIZED, FOR SOLUTION INTRAVENOUS at 11:11

## 2020-06-30 RX ADMIN — IPRATROPIUM BROMIDE AND ALBUTEROL SULFATE 3 ML: .5; 3 SOLUTION RESPIRATORY (INHALATION) at 23:12

## 2020-06-30 RX ADMIN — CEFTRIAXONE SODIUM 2 G: 2 INJECTION, POWDER, FOR SOLUTION INTRAMUSCULAR; INTRAVENOUS at 14:13

## 2020-06-30 RX ADMIN — METHYLPREDNISOLONE SODIUM SUCCINATE 62.5 MG: 125 INJECTION, POWDER, FOR SOLUTION INTRAMUSCULAR; INTRAVENOUS at 17:29

## 2020-06-30 RX ADMIN — HEPARIN SODIUM 2400 UNITS: 1000 INJECTION, SOLUTION INTRAVENOUS; SUBCUTANEOUS at 17:30

## 2020-06-30 RX ADMIN — CALCIUM CHLORIDE 1 G: 100 INJECTION INTRAVENOUS; INTRAVENTRICULAR at 15:00

## 2020-06-30 RX ADMIN — INSULIN HUMAN 5 UNITS: 100 INJECTION, SOLUTION PARENTERAL at 10:03

## 2020-06-30 RX ADMIN — DEXTROSE MONOHYDRATE 50 ML: 25 INJECTION, SOLUTION INTRAVENOUS at 10:06

## 2020-06-30 RX ADMIN — PREDNISONE 60 MG: 20 TABLET ORAL at 11:11

## 2020-06-30 RX ADMIN — DOCUSATE SODIUM 50 MG AND SENNOSIDES 8.6 MG 2 TABLET: 8.6; 5 TABLET, FILM COATED ORAL at 17:28

## 2020-06-30 RX ADMIN — ONDANSETRON 4 MG: 2 INJECTION INTRAMUSCULAR; INTRAVENOUS at 14:07

## 2020-06-30 RX ADMIN — EPINEPHRINE 10 MCG/MIN: 1 INJECTION INTRAMUSCULAR; INTRAVENOUS; SUBCUTANEOUS at 14:51

## 2020-06-30 RX ADMIN — HEPARIN SODIUM 5000 UNITS: 5000 INJECTION, SOLUTION INTRAVENOUS; SUBCUTANEOUS at 15:54

## 2020-06-30 RX ADMIN — INSULIN HUMAN 2 UNITS: 100 INJECTION, SOLUTION PARENTERAL at 21:59

## 2020-06-30 RX ADMIN — INSULIN HUMAN 1 UNITS: 100 INJECTION, SOLUTION PARENTERAL at 17:31

## 2020-06-30 RX ADMIN — SODIUM BICARBONATE 50 MEQ: 84 INJECTION INTRAVENOUS at 14:54

## 2020-06-30 RX ADMIN — SODIUM CHLORIDE 1000 ML: 9 INJECTION, SOLUTION INTRAVENOUS at 10:02

## 2020-06-30 RX ADMIN — IPRATROPIUM BROMIDE AND ALBUTEROL SULFATE 3 ML: .5; 3 SOLUTION RESPIRATORY (INHALATION) at 19:25

## 2020-06-30 RX ADMIN — SODIUM BICARBONATE 50 MEQ: 84 INJECTION INTRAVENOUS at 10:02

## 2020-06-30 RX ADMIN — VASOPRESSIN 0.03 UNITS/MIN: 20 INJECTION INTRAVENOUS at 15:44

## 2020-06-30 RX ADMIN — SODIUM CHLORIDE: 9 INJECTION, SOLUTION INTRAVENOUS at 14:00

## 2020-06-30 RX ADMIN — POLYETHYLENE GLYCOL 3350 1 PACKET: 17 POWDER, FOR SOLUTION ORAL at 22:19

## 2020-06-30 RX ADMIN — ROSUVASTATIN CALCIUM 10 MG: 20 TABLET, FILM COATED ORAL at 17:29

## 2020-06-30 RX ADMIN — IPRATROPIUM BROMIDE AND ALBUTEROL SULFATE 3 ML: .5; 3 SOLUTION RESPIRATORY (INHALATION) at 11:30

## 2020-06-30 RX ADMIN — Medication 50 MEQ: at 14:54

## 2020-06-30 ASSESSMENT — ENCOUNTER SYMPTOMS
NAUSEA: 0
FEVER: 0
CHILLS: 0
COUGH: 1
ABDOMINAL PAIN: 0
DIZZINESS: 1
MYALGIAS: 1
NAUSEA: 1
HEADACHES: 1
BACK PAIN: 1
FOCAL WEAKNESS: 0
SENSORY CHANGE: 0
STRIDOR: 0
SPUTUM PRODUCTION: 1
BLURRED VISION: 0
VOMITING: 0
SHORTNESS OF BREATH: 1

## 2020-06-30 ASSESSMENT — PULMONARY FUNCTION TESTS
EPAP_CMH2O: 8
EPAP_CMH2O: 8

## 2020-06-30 ASSESSMENT — FIBROSIS 4 INDEX
FIB4 SCORE: 0.94
FIB4 SCORE: 4.29

## 2020-06-30 ASSESSMENT — LIFESTYLE VARIABLES
DOES PATIENT WANT TO STOP DRINKING: NO
DO YOU DRINK ALCOHOL: NO

## 2020-06-30 NOTE — ED NOTES
Bedside report to Rahul ISAACS. Updated on recent VS, interventions, A&O x 4, and POC. All questions addressed. Placed on transport monitor and transported to T [id number] by ICU staff.

## 2020-06-30 NOTE — PROCEDURES
Arterial Line Placement Procedure                  Indication: arterial blood gases, severe hypotension and cardiovascular instability    Consent: The patient provided verbal consent for this procedure.    Pawel's Test: Normal    Procedure: The skin over the right brachial artery was prepped with Chloraprep.  Local anesthesia was obtained by infiltration using 1% Lidocaine without epinephrine.  A 20 gauge arterial line catheter was then inserted, using a modified Seldinger technique, into the vessel.  The transducer set was then attached and securely fastened to the skin with sutures.  Waveforms on the monitor were observed and found to be adequate.  The patient had good distal perfusion after the procedure. The site was then dressed in a sterile fashion.    The patient tolerated the procedure well.     Complications: None

## 2020-06-30 NOTE — ED TRIAGE NOTES
BIB from assisted living by EMS with   Chief Complaint   Patient presents with   • Shortness of Breath   • Dizziness   C/o SOB increasing x 3 weeks. Denies chest pain. DC'd from hospital on 6/22/20 for same complaint. Per report pt hx of COPD and CHF. Non compliant with medications per report. HR 30's. EKG obtained . ERP at bedside. PIV inserted. Blood drawn and sent to lab.

## 2020-06-30 NOTE — ED NOTES
Lab called with critical result of BUN of 97. Critical lab result read back to Lab.   Dr. Motta notified of critical lab result at 09:46.  Critical lab result read back by Dr. Motta.

## 2020-06-30 NOTE — ED NOTES
Med rec completed historically from med rec done on 6/15/2020  Unknown last doses   Unable to reach pt

## 2020-06-30 NOTE — CONSULTS
"Nephrology Consultation    Date of Service  6/30/2020    Referring Physician  Roger Florence Jr., D.O.    Consulting Physician  Eric Pompa M.D.    Reason for Consultation  HAN    History of Presenting Illness  69 y.o. female with a history of CKD 3, COPD who presented 6/30/2020 with shortness of breath.    The patient was recently admitted with shortness of breath, thought to be due to a COPD exacerbation.  The patient was treated, and discharged on 6/22/2020.  Patient notes over the last few days, she has felt lightheaded and dizzy.  The patient also notes that her breathing was getting worse.  The patient says that she was on lisinopril in the past, but now is on newer meds, and worries that these meds might have messed with her heart.  On admission, the patient was found to have bradycardia, and occasional hypotension.  The patient also complains of headache, and nausea.  She denies vomiting.  The patient complains of some slight jerking movements of her hands.  The patient denies chest pain.  On initial evaluation, the patient was found to have acute kidney injury and hyperkalemia, as well as bradycardia.    Review of Systems  Review of Systems   Constitutional: Negative for fever.   Respiratory: Positive for shortness of breath.    Cardiovascular: Negative for chest pain.   Gastrointestinal: Positive for nausea. Negative for abdominal pain and vomiting.   Neurological: Positive for headaches.   All other systems reviewed and are negative.      Past Medical History  Past Medical History:   Diagnosis Date   • Arthritis     \"everywhere\"   • ASTHMA    • Backpain     chronic back pain   • Breath shortness     \"only with flare up with allergies\"   • Bronchitis     as a child   • Congestive heart failure (HCC) 2013    related to pulmonary failure   • COPD    • Diabetes     Type 2   • Disorder of thyroid     Hypothyroid   • Fall    • Fibromyalgia    • GERD (gastroesophageal reflux disease)    • Heart burn    • " "Hepatitis C     \"I have markers in blood but not active\"   • Hypertension    • Indigestion    • Infectious disease     Hepatitis C   • Neuropathy     bilat feet   • On home oxygen therapy    • Other specified symptom associated with female genital organs     Hysterectomy at age 22yrs   • Pain 9/13/2016    \"pain everywhere\"   • PND (post-nasal drip)    • Pneumonia     as a child   • Psychiatric problem 9/13/2016    PTSD   • RLS (restless legs syndrome)    • Sleep apnea     does not wear CPAP, \"can't do it\"   • Unspecified urinary incontinence        Surgical History  Past Surgical History:   Procedure Laterality Date   • ANKLE ORIF Left 5/8/2017    Procedure: ANKLE ORIF;  Surgeon: Rico Gtz M.D.;  Location: [facility];  Service:    • MD DSTR NROLYTC AGNT PARVERTEB FCT SNGL LMBR/SACRAL Left 9/16/2016    Procedure: NEURO DEST FACET L/S W/IG SNGL - L3-S1;  Surgeon: Xavier Arteaga D.O.;  Location: SURGERY [facility]S;  Service: Pain Management   • MD DSTR NROLYTC AGNT PARVERTEB FCT ADDL LMBR/SACRAL  9/16/2016    Procedure: NEURO DEST FACET L/S W/IG ADDL;  Surgeon: Xavier Arteaga D.O.;  Location: SURGERY [facility];  Service: Pain Management   • MD DSTR NROLYTC AGNT PARVERTEB FCT ADDL LMBR/SACRAL  9/16/2016    Procedure: NEURO DEST FACET L/S W/IG ADDL;  Surgeon: Xavier Arteaga D.O.;  Location: SURGERY [facility]S;  Service: Pain Management   • PB FLUOROSCOPIC GUIDANCE NEEDLE PLACEMENT  9/16/2016    Procedure: FLOURO GUIDE NEEDLE PLACEMENT;  Surgeon: Xavier Arteaga D.O.;  Location: SURGERY [facility];  Service: Pain Management   • MD DSTR NROLYTC AGNT PARVERTEB FCT SNGL LMBR/SACRAL Right 11/6/2015    Procedure: NEURO DEST FACET L/S W/IG SNGL - L3-S1;  Surgeon: Xavier Arteaga D.O.;  Location: SURGERY [facility];  Service: Pain Management   • MD DSTR NROLYTC AGNT PARVERTEB FCT ADDL LMBR/SACRAL  11/6/2015    Procedure: NEURO DEST FACET L/S W/IG ADDL;  " Surgeon: Xavier Arteaga D.O.;  Location: [facility];  Service: Pain Management   • PB INJECT RX OTHER PERIPH NERVE  11/6/2015    Procedure: NEUROLYTIC DEST-OTHER NERVE;  Surgeon: Xavier Arteaga D.O.;  Location: [facility];  Service: Pain Management   • IA DSTR NROLYTC AGNT PARVERTEB FCT ADDL LMBR/SACRAL  11/6/2015    Procedure: NEURO DEST FACET L/S W/IG ADDL;  Surgeon: Xavier Arteaga D.O.;  Location: [facility];  Service: Pain Management   • IA DSTR NROLYTC AGNT PARVERTEB FCT SNGL LMBR/SACRAL Left 10/2/2015    Procedure: NEURO DEST FACET L/S W/IG SNGL - L3-S1;  Surgeon: Xavier Arteaga D.O.;  Location: [facility];  Service: Pain Management   • IA DSTR NROLYTC AGNT PARVERTEB FCT ADDL LMBR/SACRAL  10/2/2015    Procedure: NEURO DEST FACET L/S W/IG ADDL;  Surgeon: Xavier Arteaga D.O.;  Location: [facility];  Service: Pain Management   • PB INJECT RX OTHER PERIPH NERVE  10/2/2015    Procedure: NEUROLYTIC DEST-OTHER NERVE;  Surgeon: Xavier Arteaga D.O.;  Location: [facility];  Service: Pain Management   • IA DSTR NROLYTC AGNT PARVERTEB FCT ADDL LMBR/SACRAL  10/2/2015    Procedure: NEURO DEST FACET L/S W/IG ADDL;  Surgeon: Xavier Arteaga D.O.;  Location: [facility];  Service: Pain Management   • PB INJ DX/THER AGNT PARAVERT FACET JOINT, OBED* Left 7/17/2015    Procedure: INJ PARA FACET L/S 1 LVL W/IG - L3-S1;  Surgeon: Xavier Arteaga D.O.;  Location: [facility];  Service: Pain Management   • PB INJ DX/THER AGNT PARAVERT FACET JOINT, OBED*  7/17/2015    Procedure: INJ PARA FACET L/S 2D LVL W/IG;  Surgeon: Xavier Arteaga D.O.;  Location: SURGERY [facility];  Service: Pain Management   • PB INJ DX/THER AGNT PARAVERT FACET JOINT, OBED*  7/17/2015    Procedure: INJ PARA FACET L/S 3D LVL W/IG;  Surgeon: Xavier Arteaga D.O.;  Location: SURGERY SURG[facility];  Service:  Pain Management   • UT INJ(S) NERVE BLOCK OTHER PERIPHAL  7/17/2015    Procedure: INJ-ANES AGENT-OTHER PHER.NRVE;  Surgeon: Xavier Arteaga D.O.;  Location: SURGERY SURGICAL [facility];  Service: Pain Management   • PB INJ DX/THER AGNT PARAVERT FACET JOINT, OBED* Left 7/10/2015    Procedure: INJ PARA FACET L/S 1 LVL W/IG - L3-S1;  Surgeon: Xavier Arteaga D.O.;  Location: SURGERY SURGICAL [facility] [place] [facility];  Service: Pain Management   • PB INJ DX/THER AGNT PARAVERT FACET JOINT, OBED*  7/10/2015    Procedure: INJ PARA FACET L/S 2D LVL W/IG;  Surgeon: Xavier Arteaga D.O.;  Location: SURGERY SURGICAL [facility] [place] [facility];  Service: Pain Management   • PB INJ DX/THER AGNT PARAVERT FACET JOINT, OBED*  7/10/2015    Procedure: INJ PARA FACET L/S 3D LVL W/IG;  Surgeon: Xavier Arteaga D.O.;  Location: SURGERY SURGICAL [facility] [place] [facility];  Service: Pain Management   • UT INJ(S) NERVE BLOCK OTHER PERIPHAL  7/10/2015    Procedure: INJ-ANES AGENT-OTHER PHER.NRVE;  Surgeon: Xavier Arteaga D.O.;  Location: SURGERY SURGICAL [facility] [place] [facility];  Service: Pain Management   • GYN SURGERY      hysterectomy    • LUMPECTOMY Left     Left breast   • OTHER ORTHOPEDIC SURGERY      L knee replacement    • OTHER ORTHOPEDIC SURGERY      R carpal tunnel    • US-NEEDLE CORE BX-BREAST PANEL         Family History  Family History   Problem Relation Age of Onset   • Lung Disease Mother    • Alcohol/Drug Mother    • Heart Disease Mother    • Cancer Father    • Cancer Brother    • Diabetes Brother    • Diabetes Maternal Grandmother    • Stroke Paternal Grandmother    • Heart Disease Paternal Grandmother        Social History  Social History     Socioeconomic History   • Marital status:      Spouse name: Not on file   • Number of children: Not on file   • Years of education: Not on file   • Highest education level: Not on file   Occupational History   • Not on file   Social Needs   • Financial resource strain: Not on file   • Food insecurity     Worry: Not on file      Inability: Not on file   • Transportation needs     Medical: Not on file     Non-medical: Not on file   Tobacco Use   • Smoking status: Current Every Day Smoker     Packs/day: 0.25     Years: 50.00     Pack years: 12.50     Types: Cigarettes     Last attempt to quit: 3/20/2019     Years since quittin.2   • Smokeless tobacco: Never Used   Substance and Sexual Activity   • Alcohol use: No     Alcohol/week: 0.0 oz   • Drug use: No   • Sexual activity: Never   Lifestyle   • Physical activity     Days per week: Not on file     Minutes per session: Not on file   • Stress: Not on file   Relationships   • Social connections     Talks on phone: Not on file     Gets together: Not on file     Attends [employer] service: Not on file     Active member of club or organization: Not on file     Attends meetings of clubs or organizations: Not on file     Relationship status: Not on file   • Intimate partner violence     Fear of current or ex partner: Not on file     Emotionally abused: Not on file     Physically abused: Not on file     Forced sexual activity: Not on file   Other Topics Concern   • Not on file   Social History Narrative   • Not on file       Medications  Current Facility-Administered Medications   Medication Dose Route Frequency Provider Last Rate Last Dose   • DOPAMINE IN D5W 1.6-5 MG/ML-% IV SOLN            • ONDANSETRON HCL 4 MG/2ML INJ SOLN            • NS (BOLUS) infusion 250 mL  250 mL Intravenous DIALYSIS PRN Eric Pompa M.D.       • albumin human 25% solution 12.5 g  12.5 g Intravenous DIALYSIS PRN Eric Pompa M.D.       • Respiratory Therapy Consult   Nebulization Continuous RT Roger Florence Jr., D.O.       • ipratropium-albuterol (DUONEB) nebulizer solution  3 mL Nebulization Q4HRS (RT) Roger Florence Jr., D.O.   3 mL at 20 1130   • [START ON 2020] azithromycin (ZITHROMAX) tablet 500 mg  500 mg Oral DAILY Roger Florence Jr., D.O.       • methylPREDNISolone sod succ (SOLU-MEDROL) 125 MG  injection 62.5 mg  62.5 mg Intravenous Q12HRS ROBY Sarmiento Jr..O.       • DULoxetine (CYMBALTA) capsule 60 mg  60 mg Oral DAILY ROBY Sarmiento Jr..O.       • hydrOXYzine HCl (ATARAX) tablet 25 mg  25 mg Oral TID PRN ROBY Sarmiento Jr..O.       • levothyroxine (SYNTHROID) tablet 75 mcg  75 mcg Oral AM ES ROBY Sarmiento Jr..O.   Stopped at 06/30/20 1145   • lidocaine (LIDODERM) 5 % 1 Patch  1 Patch Transdermal Q24HRS PRN ROBY Sarmiento Jr..O.       • traZODone (DESYREL) tablet 300 mg  300 mg Oral BID ROBY Sarmiento Jr..O.       • rosuvastatin (CRESTOR) tablet 10 mg  10 mg Oral Q EVENING ROBY Sarmiento Jr..O.       • senna-docusate (PERICOLACE or SENOKOT S) 8.6-50 MG per tablet 2 Tab  2 Tab Oral BID ROBY Sarmiento Jr..OMelonie   Stopped at 06/30/20 1145    And   • polyethylene glycol/lytes (MIRALAX) PACKET 1 Packet  1 Packet Oral QDAY PRN Roger Florence Jr., D.O.        And   • magnesium hydroxide (MILK OF MAGNESIA) suspension 30 mL  30 mL Oral QDAY PRN ROBY Sarmiento Jr..O.        And   • bisacodyl (DULCOLAX) suppository 10 mg  10 mg Rectal QDAY PRN ROBY Sarmiento Jr..O.       • heparin injection 5,000 Units  5,000 Units Subcutaneous Q8HRS ROBY Sarmiento Jr..O.       • ondansetron (ZOFRAN) syringe/vial injection 4 mg  4 mg Intravenous Q4HRS PRN ROBY Sarmiento Jr..O.       • ondansetron (ZOFRAN ODT) dispertab 4 mg  4 mg Oral Q4HRS PRN ROBY Sarmiento Jr..O.       • Pharmacy Consult Request ...Pain Management Review 1 Each  1 Each Other PHARMACY TO DOSE Roger Florence Jr., D.O.        And   • oxyCODONE immediate-release (ROXICODONE) tablet 5 mg  5 mg Oral Q3HRS PRN Roger Florence Jr., D.O.        And   • oxyCODONE immediate-release (ROXICODONE) tablet 10 mg  10 mg Oral Q3HRS PRN Roger Florence Jr., D.O.        And   • morphine (pf) 4 MG/ML injection 4 mg  4 mg Intravenous Q3HRS PRN Roger Florence Jr., D.O.       • insulin regular (HUMULIN R) injection 1-6 Units  1-6  "Units Subcutaneous Q6HRS ROBY Sarmiento Jr..OMelonie        And   • glucose 4 g chewable tablet 16 g  16 g Oral Q15 MIN PRN Roger Florence Jr., D.O.        And   • dextrose 50% (D50W) injection 50 mL  50 mL Intravenous Q15 MIN PRN ROBY Sarmiento Jr..O.       • lactated ringers infusion (BOLUS)  500 mL Intravenous Once PRN Roger Florence Jr., D.O.       • cefTRIAXone (ROCEPHIN) 2 g in  mL IVPB  2 g Intravenous Q24HRS ROBY Sarmiento Jr..O.       • norepinephrine (LEVOPHED) 8 mg in  mL Infusion  0.5-30 mcg/min Intravenous Continuous ROBY Sarmiento Jr..OMelonie   Stopped at 06/30/20 1145    And   • vasopressin (VASOSTRICT) 20 Units in  mL Infusion  0.03 Units/min Intravenous Continuous ROBY Sarmiento Jr..OMelonie   Stopped at 06/30/20 1145   • umeclidinium-vilanterol (ANORO ELLIPTA) inhaler 1 Puff  1 Puff Inhalation QDAILY (RT) Roger Florence Jr., D.O.       • fluticasone (FLOVENT HFA) 44 MCG/ACT inhaler 88 mcg  2 Puff Inhalation DAILY ROBY Sarmiento Jr..LANCE.       • ipratropium-albuterol (DUONEB) nebulizer solution  3 mL Nebulization Q2HRS PRN (RT) Heber Armstrong M.D.       • ondansetron (ZOFRAN) syringe/vial injection 4 mg  4 mg Intravenous Once Heber Armstrong M.D.           Allergies  Allergies   Allergen Reactions   • Doxycycline Itching   • Vitamin C Diarrhea     \"violent diarrhea\"   • Codeine Nausea     Severe stomach pain   • Gabapentin Palpitations     Gets shaky and falls    • Keflex Rash     Severe rash, but TOLERATES PENICILLINS, Rocephin    • Lyrica Nausea     Nausea, dizzy   • Powders    • Sudafed Nausea and Unspecified     Chest pain, nausea       Physical Exam  Temp:  [35.3 °C (95.5 °F)-35.9 °C (96.7 °F)] 35.9 °C (96.7 °F)  Pulse:  [29-53] 53  Resp:  [17-40] 23  BP: ()/() 133/42  SpO2:  [91 %-100 %] 91 %    Physical Exam   Constitutional: She is oriented to person, place, and time. She appears well-developed. She appears ill. No distress. Nasal cannula in " place.   HENT:   Mouth/Throat: Oropharynx is clear and moist. No oropharyngeal exudate.   Eyes: EOM are normal. No scleral icterus.   Neck: No tracheal deviation present.   Cardiovascular: Normal heart sounds.   No murmur heard.  Bradycardic   Pulmonary/Chest: Effort normal. No stridor. She has wheezes. She has no rales.   Abdominal: Soft. There is no abdominal tenderness.   Musculoskeletal: Normal range of motion.         General: Edema (1-2+ bilateral LE) present.   Neurological: She is alert and oriented to person, place, and time.   Notable for asterixis on handgrip   Skin: Skin is warm and dry.   Psychiatric: She has a normal mood and affect.       Fluids      Laboratory  Labs reviewed, pertinent labs below.  Recent Labs     06/30/20  0849   WBC 13.7*   RBC 2.39*   HEMOGLOBIN 7.3*   HEMATOCRIT 23.6*   MCV 98.3*   MCH 30.1   MCHC 30.6*   RDW 52.5*   PLATELETCT 251   MPV 10.2     Recent Labs     06/30/20  0849   SODIUM 126*   POTASSIUM 6.3*   CHLORIDE 91*   CO2 19*   GLUCOSE 114*   BUN 97*   CREATININE 5.70*   CALCIUM 9.0     Recent Labs     06/30/20  0849   INR 1.14*            URINALYSIS:  Lab Results   Component Value Date/Time    COLORURINE DK Yellow 06/15/2019 0245    CLARITY Turbid (A) 06/15/2019 0245    SPECGRAVITY 1.020 06/15/2019 0245    PHURINE 5.0 06/15/2019 0245    KETONES Trace (A) 06/15/2019 0245    PROTEINURIN 300 (A) 06/15/2019 0245    BILIRUBINUR Negative 06/15/2019 0245    UROBILU 1.0 06/15/2019 0245    NITRITE Negative 06/15/2019 0245    LEUKESTERAS Negative 06/15/2019 0245    OCCULTBLOOD Negative 06/15/2019 0245     UPC  No results found for: TOTPROTUR No results found for: CREATININEU    Imaging reviewed  DX-CHEST-PORTABLE (1 VIEW)   Final Result      No acute cardiac or pulmonary abnormality is noted. Stable cardiomegaly with increased interstitial opacity diffusely.            Assessment/Plan  69 y.o. female with a history of CKD 3, COPD who presented 6/30/2020 with shortness of breath,  found to have bradycardia, hypotension, and HAN.    1.  HAN on CKD stage III.  The patient's baseline creatinine runs between 1.8 and 2.2.  On admission creatinine was up to 5.7.  Given that the patient has hyperkalemia, acidosis, and bradycardia, I recommend emergent dialysis.  I recommend placement of temporary dialysis catheter, with dialysis to follow.  Avoid nephrotoxins.  Check labs daily.  I am hopeful that the patient will recover kidney function and not require long-term dialysis.    2.  Bradycardia, present on admission.  Unclear etiology.  Hold beta-blockers and calcium channel blockers.  Will defer further management to cardiology.    3.  Shortness of breath on admission, possible COPD exacerbation, versus fluid overload from acute kidney injury.  We will plan on ultrafiltration with dialysis as tolerated.    4.  Hyponatremia, noted on admission.  Limit hypotonic fluids.  Check labs daily.    5.  Hyperkalemia, noted on admission.  Likely due to HAN.  This should improve with dialysis.  Low potassium diet.  Check labs daily.    6.  Metabolic acidosis, noted on admission.  Likely due to HAN.  This should improve with dialysis.  Check labs daily.    7.  Normocytic anemia, noted on admission, worse from previous.  Check iron studies.  Check CBC daily.    8.  Hypertension, hold antihypertensive agents that are beta-blockers or calcium channel blockers as above.  Plan for ultrafiltration with dialysis as tolerated.  Patient occasionally hypotensive during triage, and I suspect that she might of been hypotensive at home with her bradycardia.    Following.      Eric Pompa MD  Nephrology

## 2020-06-30 NOTE — PROCEDURES
Central Line (dialysis catheter) Placement Procedure  Indication: vascular access and hyperkalemia, HAN requiring dialysis    Consent: The patient provided verbal consent for this procedure.    Procedure: The patient was positioned appropriately and the skin over the right internal jugular vein was prepped with Chloraprep. Local anesthesia was obtained by infiltration using 1% Lidocaine without epinephrine.  A large bore needle was used to identify the vein.  A guide wire was then inserted into the vein through the needle. A triple lumen dialysis catheter was then inserted into the vessel over the guide wire using the Seldinger technique.  All ports showed good, free flowing blood return and were flushed with saline solution.  The catheter was then securely fastened to the skin with sutures and covered with a sterile dressing.  A post procedure X-ray was ordered and showed no evidence of pneumothorax.    The patient tolerated the procedure well.    Complications: blood loss <5cc

## 2020-06-30 NOTE — PROGRESS NOTES
RCC    D/w Dr Florence saw patient in the ER  Needs HD CVC 3 lumen ASAP  Been initiated for acute exacerbation of COPD  Chronic kidney disease worsening with hyperkalemia needing HD    I saw the patient in the [facility] and noted HR 30s and SBp labile, now developing hypotension along with significant increased work of breathing and bronchospasm.  She still had and received nebulized treatment which I started ASAP.  We started AVAPS for support of work of breathing and she tolerated that for about 5 or 10 minutes and then start developing nausea.  Fortunately she responded nicely nebulizer treatment and BiPAP was discontinued.  She received Zofran and nausea improved somewhat.  She denied chest pain.  Blood pressure continued to remain low.  Dopamine infusion started peripherally and dialysis catheter placed ASAP with a pigtail port for pressors - dopamine increased from 5 up to 15 without a great deal of positive effect.  Epinephrine infusion initiated and heart rate improved to the 50-60 range but blood pressure still little soft in the 70s/80s.  Norepinephrine infusion added blood pressure and heart rate improved to the ED and 70 range respectively.  Persisting hypotension prompted arterial line placement in the right radial position.  Follow-up blood gas revealed good oxygenation mild hypercarbia and significant metabolic acidosis with pH around 7.24.  Ionized calcium was 1.1.  1 amp of bicarb was infused and blood pressure improved.  1 amp of calcium chloride was infused and further blood pressure improvement noted.  Hemoglobin was 6.8 and 1 unit of blood was ordered.  Patient has been experiencing some nausea and repeat EKG was performed and significant ST depressions noted,no signs of STEMI.  Repeat troponin is being sent.  Bedside ultrasound was performed and IVC was around 2 cm and not collapsing with respirations.  Right side of the heart was not dilated and function appeared grossly okay.  There was no significant  pericardial effusion noted.  Left ventricle motion seemed acceptable as well.  Brash syndrome considered, patient with bradycardia hypotension and renal failure and known use of CCB and Metoprolol.  Formal echocardiogram ordered out of completeness.  Fortunately we do not have any angiotensin I would like to try that agent in this syndrome.  Monitoring for the need for cardiology consultation.    BRASH?  Acute hypoxic/hypercarbic RF  aeCOPD  Still smoking?  Hypotension  Bradycardia  Acute on Chronic KD w/ hyperkalemia and metabolic acidosis  Morbid obesity  DMII  EDITH  H/o pulmonary hypertension    HD CVC  A line  Titrating dopamine/epinephrine/norepinephrine  Add vasopressin  Request angiotensin from pharmacy if available  IV calcium  IV bicarb  ABG  Repeat BMP/CBC/Trop  Inapsine PRn  EKG  Blood transfusion  Bicarb one AMP  ECHO  Monitor for need for cardiac consultation    The patient remains critically ill.  Critical care time = 95 minutes in directly providing and coordinating critical care and extensive data review.  No time overlap and excludes procedures.    D/w Patient, [employer] [facility] Resident, Charge RN, HD RN, clinical pharmacist and RT

## 2020-06-30 NOTE — ED NOTES
Lab called with critical result of Creatinine of 5.7 at 09:40. Critical lab result read back to lab.   Dr. Motta notified of critical lab result at 09:46.  Critical lab result read back by Dr. Motta  .

## 2020-06-30 NOTE — ED NOTES
Lab called with critical result of Troponin of 107 at 09:40. Critical lab result read back to lab.   Dr. Motta notified of critical lab result at 09:46.  Critical lab result read back by Dr. Motta.

## 2020-06-30 NOTE — CONSULTS
Critical Care Consultation     Date of consult: 6/30/2020    Referring Physician  Mo Motta M.D.     Reason for Consultation  Acute on chronic hypoxic respiratory failure, renal failure with severe hypercapnia, junctional bradycardia    History of Presenting Illness  69 y.o. female with a past medical history of COPD on 4 L of oxygen at home, PFTs on 1/12/2015 which showed an FEV1 of 1.35 54% predicted, diabetes, hypothyroidism, fibromyalgia, hepatitis C, hypertension, CHF, stage IV chronic kidney disease on who presented 6/30/2020 with 3 weeks of worsening respiratory failure with productive cough.  Patient was recently admitted on 6/15/2020 for a COPD exacerbation and pulmonary edema.  She was discharged on Lasix, amlodipine and metoprolol in addition to her prior home regimen.  She had a negative coronavirus test during that stay in addition to another negative coronavirus test following discharge at her assisted living center.  Today the patient was brought in for her dyspnea and found to be hypoxic on her home oxygen settings.  Chest x-ray revealed cardiomegaly without any infiltrate however some increased interstitial opacities.  Coronavirus testing was again negative, EKG showed junctional bradycardia and laboratory analysis showed HAN with a potassium of 6.3.  Patient was medically treated for his hyperkalemia and nephrology was consulted for emergent HD.  Cardiology was also contacted by ERP for EKG changes and recommendations were made to improve metabolic abnormalities.  Patient is to be admitted to the ICU for ongoing care.    The patient denies any significant fever or chills, states that her cough is been increasingly productive and her dyspnea is only transiently improved with her breakthrough nebulizers.  She admits to decreased urine output and denies any chest pain, palpitations.    Code Status  DNAR, I OK    Review of Systems  Review of Systems   Constitutional: Positive for malaise/fatigue.  Negative for chills and fever.   Eyes: Negative for blurred vision.   Respiratory: Positive for cough, sputum production and shortness of breath. Negative for stridor.    Cardiovascular: Positive for leg swelling. Negative for chest pain.   Gastrointestinal: Negative for abdominal pain, nausea and vomiting.   Genitourinary: Negative for dysuria.        Oliguria   Musculoskeletal: Positive for back pain and myalgias.   Skin: Negative for rash.   Neurological: Positive for dizziness. Negative for sensory change and focal weakness.       Past Medical History   has a past medical history of Arthritis, ASTHMA, Backpain, Breath shortness, Bronchitis, Congestive heart failure (HCC) (2013), COPD, Diabetes, Disorder of thyroid, Fall, Fibromyalgia, GERD (gastroesophageal reflux disease), Heart burn, Hepatitis C, Hypertension, Indigestion, Infectious disease, Neuropathy, On home oxygen therapy, Other specified symptom associated with female genital organs, Pain (9/13/2016), PND (post-nasal drip), Pneumonia, Psychiatric problem (9/13/2016), RLS (restless legs syndrome), Sleep apnea, and Unspecified urinary incontinence.    Surgical History   has a past surgical history that includes gyn surgery; other orthopedic surgery; other orthopedic surgery; us-needle core bx-breast panel; lumpectomy (Left); pr inj dx/ther agnt paravert facet joint, bruce* (Left, 7/10/2015); pr inj dx/ther agnt paravert facet joint, bruce* (7/10/2015); pr inj dx/ther agnt paravert facet joint, bruce* (7/10/2015); pr inj(s) nerve block other periphal (7/10/2015); pr inj dx/ther agnt paravert facet joint, bruce* (Left, 7/17/2015); pr inj dx/ther agnt paravert facet joint, bruce* (7/17/2015); pr inj dx/ther agnt paravert facet joint, bruce* (7/17/2015); pr inj(s) nerve block other periphal (7/17/2015); pr dstr nrolytc agnt parverteb fct sngl lmbr/sacral (Left, 10/2/2015); pr dstr nrolytc agnt parverteb fct addl lmbr/sacral (10/2/2015); pr inject rx other periph nerve  (10/2/2015); pr dstr nrolytc agnt parverteb fct addl lmbr/sacral (10/2/2015); pr dstr nrolytc agnt parverteb fct sngl lmbr/sacral (Right, 11/6/2015); pr dstr nrolytc agnt parverteb fct addl lmbr/sacral (11/6/2015); pr inject rx other periph nerve (11/6/2015); pr dstr nrolytc agnt parverteb fct addl lmbr/sacral (11/6/2015); pr dstr nrolytc agnt parverteb fct sngl lmbr/sacral (Left, 9/16/2016); pr dstr nrolytc agnt parverteb fct addl lmbr/sacral (9/16/2016); pr dstr nrolytc agnt parverteb fct addl lmbr/sacral (9/16/2016); pr fluoroscopic guidance needle placement (9/16/2016); and ankle orif (Left, 5/8/2017).    Family History  family history includes Alcohol/Drug in her mother; Cancer in her brother and father; Diabetes in her brother and maternal grandmother; Heart Disease in her mother and paternal grandmother; Lung Disease in her mother; Stroke in her paternal grandmother.    Social History   reports that she has been smoking cigarettes. She has a 12.50 pack-year smoking history. She has never used smokeless tobacco. She reports that she does not drink alcohol or use drugs.    Medications  Home Medications     Reviewed by Rhea Acosta (Pharmacy Tech) on 06/30/20 at 0936  Med List Status: Complete   Medication Last Dose Status   albuterol 108 (90 Base) MCG/ACT Aero Soln inhalation aerosol UNK Active   amitriptyline (ELAVIL) 50 MG Tab UNK Active   amLODIPine (NORVASC) 10 MG Tab UNK Active   cyclobenzaprine (FLEXERIL) 10 MG Tab UNK Active   Diclofenac Sodium 1 % Gel UNK Active   DULoxetine (CYMBALTA) 60 MG Cap DR Particles delayed-release capsule UNK Active   furosemide (LASIX) 40 MG Tab UNK Active   hydrALAZINE (APRESOLINE) 25 MG Tab UNK Active   hydrOXYzine HCl (ATARAX) 25 MG Tab UNK Active   insulin glargine (LANTUS) 100 UNIT/ML Solution UNK Active   ipratropium-albuterol (DUONEB) 0.5-2.5 (3) MG/3ML nebulizer solution UNK Active   lactulose 10 GM/15ML Solution UNK Active   levothyroxine (SYNTHROID) 75 MCG Tab  UNK Active   lidocaine (XYLOCAINE) 5 % Ointment UNK Active   metoprolol (LOPRESSOR) 25 MG Tab UNK Active   nystatin/triamcinolone (MYCOLOG) 779305-5.1 UNIT/GM-% Cream UNK Active   oxyCODONE immediate release (ROXICODONE) 10 MG immediate release tablet UNK Active   rosuvastatin (CRESTOR) 10 MG Tab UNK Active   traZODone (DESYREL) 150 MG Tab UNK Active   TRELEGY ELLIPTA 100-62.5-25 MCG/INH AEROSOL POWDER, BREATH ACTIVATED UNK Active              Current Facility-Administered Medications   Medication Dose Route Frequency Provider Last Rate Last Dose   • NS infusion 1,000 mL  1,000 mL Intravenous Once Mo Motta M.D.       • sodium bicarbonate 8.4 % injection 50 mEq  50 mEq Intravenous Once Mo Motta M.D.       • insulin regular (HUMULIN R) injection 5 Units  5 Units Intravenous Once Mo Motta M.D.       • dextrose 50% (D50W) injection 50 mL  50 mL Intravenous Once Mo Motta M.D.       • predniSONE (DELTASONE) tablet 60 mg  60 mg Oral Once Mo Motta M.D.       • ampicillin/sulbactam (UNASYN) 3 g in  mL IVPB  3 g Intravenous Once Mo Motta M.D.       • azithromycin (ZITHROMAX) injection 500 mg  500 mg Intravenous Once Mo Motta M.D.         Current Outpatient Medications   Medication Sig Dispense Refill   • hydrALAZINE (APRESOLINE) 25 MG Tab Take 1 Tab by mouth 3 times a day. 90 Tab 0   • hydrOXYzine HCl (ATARAX) 25 MG Tab Take 1 Tab by mouth 3 times a day as needed for Anxiety. 30 Tab 0   • amLODIPine (NORVASC) 10 MG Tab Take 1 Tab by mouth every day. 30 Tab 0   • furosemide (LASIX) 40 MG Tab Take 1 Tab by mouth every day. 30 Tab 0   • metoprolol (LOPRESSOR) 25 MG Tab Take 1 Tab by mouth 2 Times a Day. 60 Tab 0   • amitriptyline (ELAVIL) 50 MG Tab Take 100 mg by mouth every evening.     • lactulose 10 GM/15ML Solution Take 20 g by mouth 2 times a day as needed.     • lidocaine (XYLOCAINE) 5 % Ointment Apply 1 Each to affected area(s) as needed.     • oxyCODONE immediate release  "(ROXICODONE) 10 MG immediate release tablet Take 10 mg by mouth 3 times a day as needed for Moderate Pain.     • traZODone (DESYREL) 150 MG Tab Take 300 mg by mouth 2 times a day.     • nystatin/triamcinolone (MYCOLOG) 922323-5.1 UNIT/GM-% Cream APPLY A THIN LAYER TO FUNGAL RASH ONCE DAILY AS NEEDED 30 g 0   • DULoxetine (CYMBALTA) 60 MG Cap DR Particles delayed-release capsule TAKE 1 CAPSULE BY MOUTH DAILY 90 Cap 1   • rosuvastatin (CRESTOR) 10 MG Tab TAKE 1 TABLET BY MOUTH EVERY EVENING 100 Tab 3   • levothyroxine (SYNTHROID) 75 MCG Tab TAKE 1 TABLET BY MOUTH DAILY 90 Tab 1   • TRELEGY ELLIPTA 100-62.5-25 MCG/INH AEROSOL POWDER, BREATH ACTIVATED INHALE 1 PUFF BY MOUTH DAILY 3 Each 0   • cyclobenzaprine (FLEXERIL) 10 MG Tab Take 1 Tab by mouth every bedtime. 90 Tab 3   • Diclofenac Sodium 1 % Gel Apply 2 g to skin as directed 2 times a day as needed (for pain). 90 g 3   • ipratropium-albuterol (DUONEB) 0.5-2.5 (3) MG/3ML nebulizer solution 3 mL by Nebulization route every 6 hours as needed for Shortness of Breath. 30 Bullet 0   • insulin glargine (LANTUS) 100 UNIT/ML Solution Inject 20 Units as instructed every evening.     • albuterol 108 (90 Base) MCG/ACT Aero Soln inhalation aerosol Inhale 2 Puffs by mouth every four hours as needed for Shortness of Breath.  3       Allergies  Allergies   Allergen Reactions   • Doxycycline Itching   • Vitamin C Diarrhea     \"violent diarrhea\"   • Codeine Nausea     Severe stomach pain   • Gabapentin Palpitations     Gets shaky and falls    • Keflex Rash     Severe rash, but TOLERATES PENICILLINS, Rocephin    • Lyrica Nausea     Nausea, dizzy   • Powders    • Sudafed Nausea and Unspecified     Chest pain, nausea       Vital Signs last 24 hours  Temp:  [35.3 °C (95.5 °F)] 35.3 °C (95.5 °F)  Pulse:  [32-37] 33  Resp:  [17-22] 17  BP: ()/() 78/47  SpO2:  [97 %-100 %] 100 %    Physical Exam  Physical Exam  Vitals signs and nursing note reviewed.   Constitutional:       " Appearance: She is obese. She is ill-appearing.   HENT:      Head: Normocephalic and atraumatic.      Right Ear: External ear normal.      Left Ear: External ear normal.      Nose: Nose normal.      Mouth/Throat:      Mouth: Mucous membranes are dry.      Pharynx: Oropharynx is clear.   Eyes:      Extraocular Movements: Extraocular movements intact.      Conjunctiva/sclera: Conjunctivae normal.   Neck:      Musculoskeletal: Normal range of motion.      Vascular: JVD present.   Cardiovascular:      Rate and Rhythm: Regular rhythm. Bradycardia present.      Pulses: Normal pulses.   Pulmonary:      Effort: Tachypnea and respiratory distress present.      Breath sounds: Decreased breath sounds, wheezing and rales present.   Abdominal:      General: Bowel sounds are normal. There is no distension.      Palpations: Abdomen is soft.      Tenderness: There is no abdominal tenderness. There is no guarding or rebound.   Musculoskeletal:      Right lower leg: Edema present.      Left lower leg: Edema present.   Skin:     General: Skin is warm and dry.      Capillary Refill: Capillary refill takes less than 2 seconds.   Neurological:      General: No focal deficit present.      Mental Status: She is alert and oriented to person, place, and time. Mental status is at baseline.      Cranial Nerves: No cranial nerve deficit.   Psychiatric:         Mood and Affect: Mood normal.         Behavior: Behavior normal.         Fluids  No intake or output data in the 24 hours ending 20 1004    Laboratory  Recent Results (from the past 48 hour(s))   EKG    Collection Time: 20  8:44 AM   Result Value Ref Range    Report       [facility]    Test Date:  2020  Pt Name:    CHAITANYA CABELLO            Department: ER  MRN:        [id number]                      Room:       [facility]  Gender:     Female                       Technician: [id number]  :        [id number]                   Requested By:NOMAN  ARUN MIRANDA  Order #:    [id number]                    Reading MD:    Measurements  Intervals                                Axis  Rate:       35                           P:  ID:                                      QRS:        13  QRSD:       112                          T:          68  QT:         540  QTc:        412    Interpretive Statements  JUNCTIONAL ESCAPE RHYTHM  NONSPECIFIC INTRAVENTRICULAR CONDUCTION DELAY  Compared to ECG 06/16/2020 13:09:46  Junctional rhythm now present  Intraventricular conduction delay now present  Sinus rhythm no longer present  T-wave abnormality no longer present  Possible ischemia no longer present  ST (T wave) deviation no longer present     CBC with Differential    Collection Time: 06/30/20  8:49 AM   Result Value Ref Range    WBC 13.7 (H) 4.8 - 10.8 K/uL    RBC 2.39 (L) 4.20 - 5.40 M/uL    Hemoglobin 7.3 (L) 12.0 - 16.0 g/dL    Hematocrit 23.6 (L) 37.0 - 47.0 %    MCV 98.3 (H) 81.4 - 97.8 fL    MCH 30.1 27.0 - 33.0 pg    MCHC 30.6 (L) 33.6 - 35.0 g/dL    RDW 52.5 (H) 35.9 - 50.0 fL    Platelet Count 251 164 - 446 K/uL    MPV 10.2 9.0 - 12.9 fL    Neutrophils-Polys 87.00 (H) 44.00 - 72.00 %    Lymphocytes 5.20 (L) 22.00 - 41.00 %    Monocytes 6.20 0.00 - 13.40 %    Eosinophils 0.30 0.00 - 6.90 %    Basophils 0.20 0.00 - 1.80 %    Immature Granulocytes 1.10 (H) 0.00 - 0.90 %    Nucleated RBC 0.00 /100 WBC    Neutrophils (Absolute) 11.91 (H) 2.00 - 7.15 K/uL    Lymphs (Absolute) 0.71 (L) 1.00 - 4.80 K/uL    Monos (Absolute) 0.85 0.00 - 0.85 K/uL    Eos (Absolute) 0.04 0.00 - 0.51 K/uL    Baso (Absolute) 0.03 0.00 - 0.12 K/uL    Immature Granulocytes (abs) 0.15 (H) 0.00 - 0.11 K/uL    NRBC (Absolute) 0.00 K/uL   Complete Metabolic Panel (CMP)    Collection Time: 06/30/20  8:49 AM   Result Value Ref Range    Sodium 126 (L) 135 - 145 mmol/L    Potassium 6.3 (H) 3.6 - 5.5 mmol/L    Chloride 91 (L) 96 - 112 mmol/L    Co2 19 (L) 20 - 33 mmol/L    Anion Gap 16.0 7.0 - 16.0    Glucose 114  (H) 65 - 99 mg/dL    Bun 97 (HH) 8 - 22 mg/dL    Creatinine 5.70 (HH) 0.50 - 1.40 mg/dL    Calcium 9.0 8.5 - 10.5 mg/dL    AST(SGOT) 290 (H) 12 - 45 U/L    ALT(SGPT) 356 (H) 2 - 50 U/L    Alkaline Phosphatase 107 (H) 30 - 99 U/L    Total Bilirubin 0.2 0.1 - 1.5 mg/dL    Albumin 3.2 3.2 - 4.9 g/dL    Total Protein 6.3 6.0 - 8.2 g/dL    Globulin 3.1 1.9 - 3.5 g/dL    A-G Ratio 1.0 g/dL   Troponin    Collection Time: 06/30/20  8:49 AM   Result Value Ref Range    Troponin T 107 (H) 6 - 19 ng/L   MAGNESIUM    Collection Time: 06/30/20  8:49 AM   Result Value Ref Range    Magnesium 2.8 (H) 1.5 - 2.5 mg/dL   TSH    Collection Time: 06/30/20  8:49 AM   Result Value Ref Range    TSH 2.490 0.380 - 5.330 uIU/mL   proBrain Natriuretic Peptide, NT    Collection Time: 06/30/20  8:49 AM   Result Value Ref Range    NT-proBNP 7661 (H) 0 - 125 pg/mL   Lactic Acid -STAT Once    Collection Time: 06/30/20  8:49 AM   Result Value Ref Range    Lactic Acid 2.0 0.5 - 2.0 mmol/L   ESTIMATED GFR    Collection Time: 06/30/20  8:49 AM   Result Value Ref Range    GFR If  9 (A) >60 mL/min/1.73 m 2    GFR If Non African American 7 (A) >60 mL/min/1.73 m 2   COVID/SARS CoV-2 PCR    Collection Time: 06/30/20  9:30 AM    Specimen: Nasopharyngeal; Respirate   Result Value Ref Range    COVID Order Status Received    SARS-CoV-2, PCR (In-House)    Collection Time: 06/30/20  9:30 AM   Result Value Ref Range    SARS-CoV-2 Source NP Swab        Imaging  DX-CHEST-PORTABLE (1 VIEW)   Final Result      1.  Satisfactory position of new right IJV multilumen dialysis catheter. No pneumothorax identified.      2.  Unchanged mild volume overload pattern.      DX-CHEST-PORTABLE (1 VIEW)   Final Result      No acute cardiac or pulmonary abnormality is noted. Stable cardiomegaly with increased interstitial opacity diffusely.      EC-ECHOCARDIOGRAM COMPLETE W/O CONT    (Results Pending)   US-RUQ    (Results Pending)       Assessment/Plan  Hyperkalemia-  (present on admission)  Assessment & Plan  Associated with acute on chronic kidney disease  ? BRASH  Junctional bradycardia on EKG, ? Consequence of hyperkalemia  Nephrology consult  Urgent HD    Acute renal failure superimposed on stage 4 chronic kidney disease (HCC)- (present on admission)  Assessment & Plan  Stat nephrology consult  HD for hyperkalemia  Renal dose meds, avoid nephrotoxins  Strict I/Os  Follow renal function    Goals of care, counseling/discussion- (present on admission)  Assessment & Plan  Discussed CODE STATUS with patient, she has a POLST form stating DNR/DNI however patient states she would want short-term intubation if necessary for a reversible cause.    She would like to update her POLST form prior to discharge    Leukocytosis- (present on admission)  Assessment & Plan  Productive cough, chest x-ray nonspecific  Start pneumonia coverage  Monitor      Acute exacerbation of chronic obstructive pulmonary disease (COPD) ([facility])- (present on admission)  Assessment & Plan  On 4 L nasal cannula 24/7  Scheduled nebs  RT/O2 Protocols  Titrate supplemental FiO2 to maintain SpO2 >92%  Continue Anoro Ellipta  IV steroids  Antimicrobials for change in sputum  Low threshold to advance to noninvasive positive pressure ventilation or intubation if necessary    DM type 2, uncontrolled, with renal complications (HCC)- (present on admission)  Assessment & Plan  Goal blood glucose 120-180  sliding scale insulin, accuchecks  Hold Lantus given renal failure  hypoglycemia protocol  A1c 6.2 two weeks ago      Junctional bradycardia- (present on admission)  Assessment & Plan  ? BRASH syndrome (on B-blocker and Ca channel blocker) vs HyperK  Tele  Echo  Trend trop  Monitor for need to pace  Monitor for need to consult Cardiology    Acute on chronic respiratory failure with hypoxia (HCC)- (present on admission)  Assessment & Plan  Secondary to COPD exacerbation  See above    Anemia- (present on admission)  Assessment &  Plan  Acute on chronic  Baseline hemoglobin around 8  No signs of active bleeding  Continue to monitor  Transfuse for hemoglobin less than 7    Acute on chronic diastolic congestive heart failure (HCC)- (present on admission)  Assessment & Plan  Small amount of edema noted on chest x-ray, pro-BNP 7661  Diuresis when more stable  Restart heart failure regimen when clinically appropriate  Consider cardiology consultation as indicated    Pulmonary HTN (HCC)- (present on admission)  Assessment & Plan  Likely due to COPD and obesity  RVSP 60 mmHg  Judicious IV fluid use  Diuresis when able    Tobacco dependence- (present on admission)  Assessment & Plan  Nicotine replacement protocol    Chronic use of opiate drug for therapeutic purpose- (present on admission)  Assessment & Plan  Care with opiates given current state    EDITH (obstructive sleep apnea)- (present on admission)  Assessment & Plan  Monitor for the need to start nocturnal BiPAP    Hypothyroidism- (present on admission)  Assessment & Plan  Continue Synthroid 75 mcg PO qDay  Check TSH      Discussed patient condition and risk of morbidity and/or mortality with RN, RT, Pharmacy, Code status disscussed, Charge nurse / hot rounds, Patient, nephrology and ERP.      The patient remains critically ill.  Critical care time = 82 minutes in directly providing and coordinating critical care and extensive data review.  No time overlap and excludes procedures.

## 2020-06-30 NOTE — ED PROVIDER NOTES
"ED Provider Note    Scribed for Mo Motta M.D. by Arie Peters. 6/30/2020  8:41 AM    Primary care provider: Crystal Ramos M.D.  Means of arrival: EMS  History obtained from: Patient  History limited by: None    CHIEF COMPLAINT  Chief Complaint   Patient presents with   • Shortness of Breath   • Dizziness       HPI  Priya Callahan is a 69 y.o. female with a history of asthma, COPD, and congestive heart failure who presents to the Emergency Department complaining of shortness of breath and productive cough that began three weeks ago. Just standing up exacerbates her shortness of breath and she notes dizziness when standing as well. Her cough seemed to be resolving, but returned yesterday. She complains of associated left foot swelling as well. She denies abdominal pain, chest pain, congestion, rhinorrhea, blood in stool, or vomiting. The patient was recently hospitalized for shortness of breath and discharged on 6/22/2020. She uses an Albuterol inhaler and DuoNeb twice daily, but did not recall that she is prescribed an Ellipta inhaler. Patient has noticed some improvement after her albuterol and nebulizer treatments. She denies any knonwn COVID exposure. She believes she is currently taking both a steroid and an antibiotic, although, chart review dictates otherwise. No history of DVT, PE, cardiac arrhythmia, or myocardial infarction. She is on 4 liters oxygen 24/7. She is not anticoagulated. Patient denies smoking.    REVIEW OF SYSTEMS  Pertinent positives include: shortness of breath, dizziness, productive cough, foot swelling.  Pertinent negatives include:  abdominal pain, chest pain, congestion, rhinorrhea, blood in stool, or vomiting.  10+ systems reviewed and negative.      PAST MEDICAL HISTORY  Past Medical History:   Diagnosis Date   • Arthritis     \"everywhere\"   • ASTHMA    • Backpain     chronic back pain   • Breath shortness     \"only with flare up with allergies\"   • Bronchitis     as " "a child   • Congestive heart failure (HCC) 2013    related to pulmonary failure   • COPD    • Diabetes     Type 2   • Disorder of thyroid     Hypothyroid   • Fall    • Fibromyalgia    • GERD (gastroesophageal reflux disease)    • Heart burn    • Hepatitis C     \"I have markers in blood but not active\"   • Hypertension    • Indigestion    • Infectious disease     Hepatitis C   • Neuropathy     bilat feet   • On home oxygen therapy    • Other specified symptom associated with female genital organs     Hysterectomy at age 22yrs   • Pain 2016    \"pain everywhere\"   • PND (post-nasal drip)    • Pneumonia     as a child   • Psychiatric problem 2016    PTSD   • RLS (restless legs syndrome)    • Sleep apnea     does not wear CPAP, \"can't do it\"   • Unspecified urinary incontinence        FAMILY HISTORY  Family History   Problem Relation Age of Onset   • Lung Disease Mother    • Alcohol/Drug Mother    • Heart Disease Mother    • Cancer Father    • Cancer Brother    • Diabetes Brother    • Diabetes Maternal Grandmother    • Stroke Paternal Grandmother    • Heart Disease Paternal Grandmother        SOCIAL HISTORY  Social History     Tobacco Use   • Smoking status: Current Every Day Smoker     Packs/day: 0.25     Years: 50.00     Pack years: 12.50     Types: Cigarettes     Last attempt to quit: 3/20/2019     Years since quittin.2   • Smokeless tobacco: Never Used   Substance Use Topics   • Alcohol use: No     Alcohol/week: 0.0 oz   • Drug use: No     Social History     Substance and Sexual Activity   Drug Use No       SURGICAL HISTORY  Past Surgical History:   Procedure Laterality Date   • ANKLE ORIF Left 2017    Procedure: ANKLE ORIF;  Surgeon: Rico Gtz M.D.;  Location: SURGERY [facility];  Service:    • UT DSTR NROLYTC AGNT PARVERTEB FCT SNGL LMBR/SACRAL Left 2016    Procedure: NEURO DEST FACET L/S W/IG SNGL - L3-S1;  Surgeon: Xavier Arteaga D.O.;  Location: SURGERY [facility]S; "  Service: Pain Management   • NV DSTR NROLYTC AGNT PARVERTEB FCT ADDL LMBR/SACRAL  9/16/2016    Procedure: NEURO DEST FACET L/S W/IG ADDL;  Surgeon: Xavier Arteaga D.O.;  Location: SURGERY [facility];  Service: Pain Management   • NV DSTR NROLYTC AGNT PARVERTEB FCT ADDL LMBR/SACRAL  9/16/2016    Procedure: NEURO DEST FACET L/S W/IG ADDL;  Surgeon: Xavier Arteaga D.O.;  Location: SURGERY [facility];  Service: Pain Management   • PB FLUOROSCOPIC GUIDANCE NEEDLE PLACEMENT  9/16/2016    Procedure: FLOURO GUIDE NEEDLE PLACEMENT;  Surgeon: Xavier Arteaga D.O.;  Location: [facility];  Service: Pain Management   • NV DSTR NROLYTC AGNT PARVERTEB FCT SNGL LMBR/SACRAL Right 11/6/2015    Procedure: NEURO DEST FACET L/S W/IG SNGL - L3-S1;  Surgeon: Xavier Arteaga D.O.;  Location: SURGERY [facility];  Service: Pain Management   • NV DSTR NROLYTC AGNT PARVERTEB FCT ADDL LMBR/SACRAL  11/6/2015    Procedure: NEURO DEST FACET L/S W/IG ADDL;  Surgeon: Xavier Arteaga D.O.;  Location: [facility];  Service: Pain Management   • PB INJECT RX OTHER PERIPH NERVE  11/6/2015    Procedure: NEUROLYTIC DEST-OTHER NERVE;  Surgeon: Xavier Arteaga D.O.;  Location: SURGERY [facility];  Service: Pain Management   • NV DSTR NROLYTC AGNT PARVERTEB FCT ADDL LMBR/SACRAL  11/6/2015    Procedure: NEURO DEST FACET L/S W/IG ADDL;  Surgeon: Xavier Arteaga D.O.;  Location: SURGERY [facility];  Service: Pain Management   • NV DSTR NROLYTC AGNT PARVERTEB FCT SNGL LMBR/SACRAL Left 10/2/2015    Procedure: NEURO DEST FACET L/S W/IG SNGL - L3-S1;  Surgeon: Xavier Arteaag D.O.;  Location: SURGERY [facility];  Service: Pain Management   • NV DSTR NROLYTC AGNT PARVERTEB FCT ADDL LMBR/SACRAL  10/2/2015    Procedure: NEURO DEST FACET L/S W/IG ADDL;  Surgeon: Xavier Arteaga D.O.;  Location: SURGERY [facility];  Service: Pain Management   • PB INJECT RX OTHER  PERIPH NERVE  10/2/2015    Procedure: NEUROLYTIC DEST-OTHER NERVE;  Surgeon: Xavier Arteaga D.O.;  Location: SURGERY [facility];  Service: Pain Management   • VT DSTR NROLYTC AGNT PARVERTEB FCT ADDL LMBR/SACRAL  10/2/2015    Procedure: NEURO DEST FACET L/S W/IG ADDL;  Surgeon: Xavier Arteaga D.O.;  Location: SURGERY [facility];  Service: Pain Management   • PB INJ DX/THER AGNT PARAVERT FACET JOINT, OBED* Left 7/17/2015    Procedure: INJ PARA FACET L/S 1 LVL W/IG - L3-S1;  Surgeon: Xavier Arteaga D.O.;  Location: SURGERY [facility];  Service: Pain Management   • PB INJ DX/THER AGNT PARAVERT FACET JOINT, OBED*  7/17/2015    Procedure: INJ PARA FACET L/S 2D LVL W/IG;  Surgeon: Xavier Arteaga D.O.;  Location: SURGERY [facility];  Service: Pain Management   • PB INJ DX/THER AGNT PARAVERT FACET JOINT, OBED*  7/17/2015    Procedure: INJ PARA FACET L/S 3D LVL W/IG;  Surgeon: Xavier Arteaga D.O.;  Location: SURGERY [facility];  Service: Pain Management   • VT INJ(S) NERVE BLOCK OTHER PERIPHAL  7/17/2015    Procedure: INJ-ANES AGENT-OTHER PHER.NRVE;  Surgeon: Xavier Arteaga D.O.;  Location: SURGERY [facility];  Service: Pain Management   • PB INJ DX/THER AGNT PARAVERT FACET JOINT, OBED* Left 7/10/2015    Procedure: INJ PARA FACET L/S 1 LVL W/IG - L3-S1;  Surgeon: Xavier Arteaga D.O.;  Location: SURGERY [facility];  Service: Pain Management   • PB INJ DX/THER AGNT PARAVERT FACET JOINT, OBED*  7/10/2015    Procedure: INJ PARA FACET L/S 2D LVL W/IG;  Surgeon: Xavier Arteaga D.O.;  Location: SURGERY [facility];  Service: Pain Management   • PB INJ DX/THER AGNT PARAVERT FACET JOINT, OBED*  7/10/2015    Procedure: INJ PARA FACET L/S 3D LVL W/IG;  Surgeon: Xavier Arteaga D.O.;  Location: [facility];  Service: Pain Management   • VT INJ(S) NERVE BLOCK OTHER PERIPHAL  7/10/2015    Procedure: INJ-ANES AGENT-OTHER PHER.NRVE;  Surgeon: Xavier WYLIE  EBONY Arteaga;  Location: [facility];  Service: Pain Management   • GYN SURGERY      hysterectomy    • LUMPECTOMY Left     Left breast   • OTHER ORTHOPEDIC SURGERY      L knee replacement    • OTHER ORTHOPEDIC SURGERY      R carpal tunnel    • US-NEEDLE CORE BX-BREAST PANEL         CURRENT MEDICATIONS  Current Outpatient Medications on File Prior to Encounter   Medication Sig Dispense Refill   • hydrALAZINE (APRESOLINE) 25 MG Tab Take 1 Tab by mouth 3 times a day. 90 Tab 0   • hydrOXYzine HCl (ATARAX) 25 MG Tab Take 1 Tab by mouth 3 times a day as needed for Anxiety. 30 Tab 0   • amLODIPine (NORVASC) 10 MG Tab Take 1 Tab by mouth every day. 30 Tab 0   • furosemide (LASIX) 40 MG Tab Take 1 Tab by mouth every day. 30 Tab 0   • metoprolol (LOPRESSOR) 25 MG Tab Take 1 Tab by mouth 2 Times a Day. 60 Tab 0   • amitriptyline (ELAVIL) 50 MG Tab Take 100 mg by mouth every evening.     • lactulose 10 GM/15ML Solution Take 20 g by mouth 2 times a day as needed.     • lidocaine (XYLOCAINE) 5 % Ointment Apply 1 Each to affected area(s) as needed.     • oxyCODONE immediate release (ROXICODONE) 10 MG immediate release tablet Take 10 mg by mouth 3 times a day as needed for Moderate Pain.     • traZODone (DESYREL) 150 MG Tab Take 300 mg by mouth 2 times a day.     • nystatin/triamcinolone (MYCOLOG) 403822-3.1 UNIT/GM-% Cream APPLY A THIN LAYER TO FUNGAL RASH ONCE DAILY AS NEEDED 30 g 0   • DULoxetine (CYMBALTA) 60 MG Cap DR Particles delayed-release capsule TAKE 1 CAPSULE BY MOUTH DAILY 90 Cap 1   • rosuvastatin (CRESTOR) 10 MG Tab TAKE 1 TABLET BY MOUTH EVERY EVENING 100 Tab 3   • levothyroxine (SYNTHROID) 75 MCG Tab TAKE 1 TABLET BY MOUTH DAILY 90 Tab 1   • TRELEGY ELLIPTA 100-62.5-25 MCG/INH AEROSOL POWDER, BREATH ACTIVATED INHALE 1 PUFF BY MOUTH DAILY 3 Each 0   • cyclobenzaprine (FLEXERIL) 10 MG Tab Take 1 Tab by mouth every bedtime. 90 Tab 3   • Diclofenac Sodium 1 % Gel Apply 2 g to skin as directed 2 times a  "day as needed (for pain). 90 g 3   • ipratropium-albuterol (DUONEB) 0.5-2.5 (3) MG/3ML nebulizer solution 3 mL by Nebulization route every 6 hours as needed for Shortness of Breath. 30 Bullet 0   • insulin glargine (LANTUS) 100 UNIT/ML Solution Inject 20 Units as instructed every evening.     • albuterol 108 (90 Base) MCG/ACT Aero Soln inhalation aerosol Inhale 2 Puffs by mouth every four hours as needed for Shortness of Breath.  3       ALLERGIES  Allergies   Allergen Reactions   • Doxycycline Itching   • Vitamin C Diarrhea     \"violent diarrhea\"   • Codeine Nausea     Severe stomach pain   • Gabapentin Palpitations     Gets shaky and falls    • Keflex Rash     Severe rash, but TOLERATES PENICILLINS, Rocephin    • Lyrica Nausea     Nausea, dizzy   • Powders    • Sudafed Nausea and Unspecified     Chest pain, nausea       PHYSICAL EXAM  VITAL SIGNS: /107   Pulse (!) 37   Resp (!) 22   Ht 1.651 m (5' 5\")   Wt 100.7 kg (222 lb 0.1 oz)   SpO2 100%   BMI 36.94 kg/m²   Reviewed and tachypneic, bradycardic, no hypoxia on supplemental oxygen at 6 L which is increased from her baseline of 4  Constitutional: Well developed, Well nourished, Obese.  HENT: Normocephalic, atraumatic, bilateral external ears normal, oropharynx moist, No exudates or erythema.   Eyes: PERRLA, Conjunctiva slightly pale  Cardiovascular: Regular rate and rhythm. No murmurs, rubs or gallops.   Respiratory: Inspiratory rhonchi and fine expiratory wheezes throughout  Abdominal:  Abdomen soft, non-tender, non distended. No rebound, or guarding.    Skin: No erythema, no rash.   Genitourinary: No costovertebral angle tenderness.   Musculoskeletal: Left ankle edema.   Neurologic: Alert & oriented x 3, cranial nerves 2-12 intact by passive exam.  No focal deficit noted.  Psychiatric: Affect normal, Judgment normal, Mood normal.    DIFFERENTIAL DIAGNOSIS:  COPD, CHF, heart block, SSS, MI, COVID, pneumonia, accidental beta blocker overdose    EKG " Interpretation:  Interpreted by me    Rhythm: Junctional rhythm  Rate: 35  Axis: normal  Ectopy: none  Poor septal R wave progression  ST Segments: no acute change  T Waves: no acute change  Q Waves: none  Clinical Impression: Junction rhythm possible old anterior septal ischemia    RADIOLOGY/PROCEDURES  DX-CHEST-PORTABLE (1 VIEW)   Final Result      No acute cardiac or pulmonary abnormality is noted. Stable cardiomegaly with increased interstitial opacity diffusely.        Radiologist interpretation have been reviewed by me.     LABORATORY:  Results for orders placed or performed during the hospital encounter of 06/30/20   CBC with Differential   Result Value Ref Range    WBC 13.7 (H) 4.8 - 10.8 K/uL    RBC 2.39 (L) 4.20 - 5.40 M/uL    Hemoglobin 7.3 (L) 12.0 - 16.0 g/dL    Hematocrit 23.6 (L) 37.0 - 47.0 %    MCV 98.3 (H) 81.4 - 97.8 fL    MCH 30.1 27.0 - 33.0 pg    MCHC 30.6 (L) 33.6 - 35.0 g/dL    RDW 52.5 (H) 35.9 - 50.0 fL    Platelet Count 251 164 - 446 K/uL    MPV 10.2 9.0 - 12.9 fL    Neutrophils-Polys 87.00 (H) 44.00 - 72.00 %    Lymphocytes 5.20 (L) 22.00 - 41.00 %    Monocytes 6.20 0.00 - 13.40 %    Eosinophils 0.30 0.00 - 6.90 %    Basophils 0.20 0.00 - 1.80 %    Immature Granulocytes 1.10 (H) 0.00 - 0.90 %    Nucleated RBC 0.00 /100 WBC    Neutrophils (Absolute) 11.91 (H) 2.00 - 7.15 K/uL    Lymphs (Absolute) 0.71 (L) 1.00 - 4.80 K/uL    Monos (Absolute) 0.85 0.00 - 0.85 K/uL    Eos (Absolute) 0.04 0.00 - 0.51 K/uL    Baso (Absolute) 0.03 0.00 - 0.12 K/uL    Immature Granulocytes (abs) 0.15 (H) 0.00 - 0.11 K/uL    NRBC (Absolute) 0.00 K/uL   Complete Metabolic Panel (CMP)   Result Value Ref Range    Sodium 126 (L) 135 - 145 mmol/L    Potassium 6.3 (H) 3.6 - 5.5 mmol/L    Chloride 91 (L) 96 - 112 mmol/L    Co2 19 (L) 20 - 33 mmol/L    Anion Gap 16.0 7.0 - 16.0    Glucose 114 (H) 65 - 99 mg/dL    Bun 97 (HH) 8 - 22 mg/dL    Creatinine 5.70 (HH) 0.50 - 1.40 mg/dL    Calcium 9.0 8.5 - 10.5 mg/dL     AST(SGOT) 290 (H) 12 - 45 U/L    ALT(SGPT) 356 (H) 2 - 50 U/L    Alkaline Phosphatase 107 (H) 30 - 99 U/L    Total Bilirubin 0.2 0.1 - 1.5 mg/dL    Albumin 3.2 3.2 - 4.9 g/dL    Total Protein 6.3 6.0 - 8.2 g/dL    Globulin 3.1 1.9 - 3.5 g/dL    A-G Ratio 1.0 g/dL   Troponin   Result Value Ref Range    Troponin T 107 (H) 6 - 19 ng/L   COVID/SARS CoV-2 PCR    Specimen: Nasopharyngeal; Respirate   Result Value Ref Range    COVID Order Status Received    MAGNESIUM   Result Value Ref Range    Magnesium 2.8 (H) 1.5 - 2.5 mg/dL   TSH   Result Value Ref Range    TSH 2.490 0.380 - 5.330 uIU/mL   proBrain Natriuretic Peptide, NT   Result Value Ref Range    NT-proBNP 7661 (H) 0 - 125 pg/mL   Lactic Acid -STAT Once   Result Value Ref Range    Lactic Acid 2.0 0.5 - 2.0 mmol/L   Prothrombin time (INR)   Result Value Ref Range    PT 15.0 (H) 12.0 - 14.6 sec    INR 1.14 (H) 0.87 - 1.13     Lab results reviewed by me.     INTERVENTIONS: Indication IV fluids hypotension for which p.o. hydration and adequate  Medications   NS infusion 1,000 mL (0 mL Intravenous Stopped 6/30/20 1110)   predniSONE (DELTASONE) tablet 60 mg (has no administration in time range)   ampicillin/sulbactam (UNASYN) 3 g in  mL IVPB (0 g Intravenous Stopped 6/30/20 1041)   azithromycin (ZITHROMAX) injection 500 mg (has no administration in time range)   albuterol inhaler 2 Puff (2 Puffs Inhalation Given 6/30/20 0933)   sodium bicarbonate 8.4 % injection 50 mEq (50 mEq Intravenous Given 6/30/20 1002)   insulin regular (HUMULIN R) injection 5 Units (5 Units Intravenous Given 6/30/20 1003)   dextrose 50% (D50W) injection 50 mL (0 mL Intravenous Stopped 6/30/20 1010)     Response: Persistence of bradycardia but stabilization of hypotension.    ED COURSE:  Nursing notes, VS, PMSFHx reviewed in chart.     Review of past medical records shows the patient is taking Ellipta, albuterol, and DuoNeb per chart review, although, she was not able to recall that she takes  Ellipta. Review of echocardiogram from 6/15 demonstrates an EF of 65% as well as moderate mitral and aortic valve stenosis. She is apparently not taking antibiotics or steroids despite what she said on initial exam. She had a stress test in 2017 as well as a halter monitor study in 2018 which demonstrated some PVCs. Her heart rate was 78 last admission.    8:41 AM - Patient seen and examined at bedside. Her SPO2 was 100% on six liters in the exam room. Patient will be treated with albuterol 2 puff for her symptoms. Ordered DX chest portable 1 view, lactic acid STAT and every two hours after STAT order, PT/INR, blood cultures x2, U/A, COVID/SARS, magnesium, phosphorus, TSH, proBNP, CBC with differential, CBC with differential, CMP, troponin, and EKG to evaluate.    9:35 AM Paged Hospitalist.    9:41 AM I discussed the patient's case and the above findings with Dr. Haq (Hospitalist) who agrees to evaluate for hospitalization.    9:48 AM Ordered NS 1 liter, sodium bicarbonate 8.4% 50 mEq, insulin regular 5 units, and dextrose 50% 50 ml. HYDRATION: Based on the patient's presentation of hypokalemia and sepsis the patient was given IV fluids. IV Hydration was used because oral hydration was not adequate alone. Upon recheck following hydration, the patient was well hydrated.    10:00 AM Unasyn 3 g in  ml and azithyromycin 500 mg.    10:06 AM I discussed the patient's case and the above findings with Dr. Florence ([facility]) who will evaluate for hospitalization. Dr. Haq is aware that the patient will be admitted to the ICU.    10:30 AM Still no pressure, heart rate unchnaged, patient appears well.    10:35 AM Paged Nephrology.    10:38 AM I discussed the patient's case and the above findings with Dr. Cunha (Nephrology) who will consult.    10:55 AM I discussed the patient's case and the above findings with Dr. Pompa (Nephrology).    CRITICAL CARE  The very real possibility of a deterioration of this patient's condition  required the highest level of my preparedness for sudden, emergent intervention.  I provided critical care services, which included medication orders, frequent reevaluations of the patient's condition and response to treatment, ordering and reviewing test results, and discussing the case with various consultants.  The critical care time associated with the care of the patient was 35 minutes. Review chart for interventions. This time is exclusive of any other billable procedures.      MEDICAL DECISION MAKING:  This ill patient presents with dyspnea COPD exacerbation CHF and acute renal failure with hyperkalemia and junctional bradycardia.  She has elevated liver enzymes which are new and likely secondary to hepatic congestion from her heart failure.  Her anemia is baseline.  COVID is unlikely.  There is no definite pneumonia.    PLAN:  Bronchodilators, steroids, antibiotics, dialysis, cardiology consultation    CONDITION: Patient admitted to the ICU in critical condition.     FINAL IMPRESSION  1. Acute exacerbation of chronic obstructive pulmonary disease (COPD) (HCC)    2. Junctional escape rhythm    3. Bradycardia    4.      Total critical care time of 35 minutes, separate of any billable procedures.     IArie (Miguelibe), am scribing for, and in the presence of, Mo Motta M.D..    Electronically signed by: Arie Peters (Scribe), 6/30/2020    IMo M.D. personally performed the services described in this documentation, as scribed by Arie Peters in my presence, and it is both accurate and complete.    C    The note accurately reflects work and decisions made by me.  Mo Motta M.D.  6/30/2020  2:40 PM

## 2020-06-30 NOTE — DISCHARGE PLANNING
Admitting Physician Dr. Florence placed Social Service consult for  advanced directives and POLST.     Patient has a POLST and and DPOA for Health Care on file.   They are scanned into Pt chart.

## 2020-06-30 NOTE — PROGRESS NOTES
Triage officer note /transfer note     Room G 26    D/W Dr Motta     CC: SOB and bradycardia     ED course: CPOD exacerbation + possible junctional [place], cards will be called by ERP and likely need pacemaker.     Need to do: monitor HR, and trop and COVID pending, cardiology consult, possible pacemaker.     Admit to     ICU

## 2020-07-01 ENCOUNTER — APPOINTMENT (OUTPATIENT)
Dept: RADIOLOGY | Facility: MEDICAL CENTER | Age: 70
DRG: 291 | End: 2020-07-01
Attending: INTERNAL MEDICINE
Payer: MEDICARE

## 2020-07-01 ENCOUNTER — APPOINTMENT (OUTPATIENT)
Dept: CARDIOLOGY | Facility: MEDICAL CENTER | Age: 70
DRG: 291 | End: 2020-07-01
Attending: INTERNAL MEDICINE
Payer: MEDICARE

## 2020-07-01 LAB
ANION GAP SERPL CALC-SCNC: 14 MMOL/L (ref 7–16)
ANION GAP SERPL CALC-SCNC: 16 MMOL/L (ref 7–16)
ANION GAP SERPL CALC-SCNC: 17 MMOL/L (ref 7–16)
BASOPHILS # BLD AUTO: 0.2 % (ref 0–1.8)
BASOPHILS # BLD: 0.03 K/UL (ref 0–0.12)
BUN SERPL-MCNC: 43 MG/DL (ref 8–22)
BUN SERPL-MCNC: 66 MG/DL (ref 8–22)
BUN SERPL-MCNC: 68 MG/DL (ref 8–22)
CALCIUM SERPL-MCNC: 8.4 MG/DL (ref 8.5–10.5)
CALCIUM SERPL-MCNC: 8.5 MG/DL (ref 8.5–10.5)
CALCIUM SERPL-MCNC: 8.6 MG/DL (ref 8.5–10.5)
CHLORIDE SERPL-SCNC: 88 MMOL/L (ref 96–112)
CHLORIDE SERPL-SCNC: 90 MMOL/L (ref 96–112)
CHLORIDE SERPL-SCNC: 93 MMOL/L (ref 96–112)
CO2 SERPL-SCNC: 23 MMOL/L (ref 20–33)
CO2 SERPL-SCNC: 23 MMOL/L (ref 20–33)
CO2 SERPL-SCNC: 25 MMOL/L (ref 20–33)
CREAT SERPL-MCNC: 2.81 MG/DL (ref 0.5–1.4)
CREAT SERPL-MCNC: 3.74 MG/DL (ref 0.5–1.4)
CREAT SERPL-MCNC: 4.05 MG/DL (ref 0.5–1.4)
EKG IMPRESSION: NORMAL
EKG IMPRESSION: NORMAL
EOSINOPHIL # BLD AUTO: 0 K/UL (ref 0–0.51)
EOSINOPHIL NFR BLD: 0 % (ref 0–6.9)
ERYTHROCYTE [DISTWIDTH] IN BLOOD BY AUTOMATED COUNT: 52.8 FL (ref 35.9–50)
GLUCOSE BLD-MCNC: 197 MG/DL (ref 65–99)
GLUCOSE BLD-MCNC: 201 MG/DL (ref 65–99)
GLUCOSE BLD-MCNC: 285 MG/DL (ref 65–99)
GLUCOSE SERPL-MCNC: 230 MG/DL (ref 65–99)
GLUCOSE SERPL-MCNC: 259 MG/DL (ref 65–99)
GLUCOSE SERPL-MCNC: 265 MG/DL (ref 65–99)
HCT VFR BLD AUTO: 22.5 % (ref 37–47)
HGB BLD-MCNC: 7.2 G/DL (ref 12–16)
IMM GRANULOCYTES # BLD AUTO: 0.12 K/UL (ref 0–0.11)
IMM GRANULOCYTES NFR BLD AUTO: 0.8 % (ref 0–0.9)
LACTATE BLD-SCNC: 1 MMOL/L (ref 0.5–2)
LV EJECT FRACT  99904: 80
LV EJECT FRACT MOD 2C 99903: 73.06
LV EJECT FRACT MOD 4C 99902: 78.18
LV EJECT FRACT MOD BP 99901: 74.5
LYMPHOCYTES # BLD AUTO: 0.21 K/UL (ref 1–4.8)
LYMPHOCYTES NFR BLD: 1.4 % (ref 22–41)
MCH RBC QN AUTO: 29.9 PG (ref 27–33)
MCHC RBC AUTO-ENTMCNC: 32 G/DL (ref 33.6–35)
MCV RBC AUTO: 93.4 FL (ref 81.4–97.8)
MONOCYTES # BLD AUTO: 0.38 K/UL (ref 0–0.85)
MONOCYTES NFR BLD AUTO: 2.5 % (ref 0–13.4)
NEUTROPHILS # BLD AUTO: 14.24 K/UL (ref 2–7.15)
NEUTROPHILS NFR BLD: 95.1 % (ref 44–72)
NRBC # BLD AUTO: 0 K/UL
NRBC BLD-RTO: 0 /100 WBC
PLATELET # BLD AUTO: 176 K/UL (ref 164–446)
PMV BLD AUTO: 9.7 FL (ref 9–12.9)
POTASSIUM SERPL-SCNC: 4.4 MMOL/L (ref 3.6–5.5)
POTASSIUM SERPL-SCNC: 5.2 MMOL/L (ref 3.6–5.5)
POTASSIUM SERPL-SCNC: 5.4 MMOL/L (ref 3.6–5.5)
RBC # BLD AUTO: 2.41 M/UL (ref 4.2–5.4)
SODIUM SERPL-SCNC: 128 MMOL/L (ref 135–145)
SODIUM SERPL-SCNC: 129 MMOL/L (ref 135–145)
SODIUM SERPL-SCNC: 132 MMOL/L (ref 135–145)
TROPONIN T SERPL-MCNC: 117 NG/L (ref 6–19)
WBC # BLD AUTO: 15 K/UL (ref 4.8–10.8)

## 2020-07-01 PROCEDURE — 700101 HCHG RX REV CODE 250: Performed by: INTERNAL MEDICINE

## 2020-07-01 PROCEDURE — 93010 ELECTROCARDIOGRAM REPORT: CPT | Performed by: INTERNAL MEDICINE

## 2020-07-01 PROCEDURE — 94640 AIRWAY INHALATION TREATMENT: CPT

## 2020-07-01 PROCEDURE — 83605 ASSAY OF LACTIC ACID: CPT

## 2020-07-01 PROCEDURE — 94669 MECHANICAL CHEST WALL OSCILL: CPT

## 2020-07-01 PROCEDURE — 80048 BASIC METABOLIC PNL TOTAL CA: CPT | Mod: 91

## 2020-07-01 PROCEDURE — 76705 ECHO EXAM OF ABDOMEN: CPT

## 2020-07-01 PROCEDURE — 5A1D70Z PERFORMANCE OF URINARY FILTRATION, INTERMITTENT, LESS THAN 6 HOURS PER DAY: ICD-10-PCS | Performed by: INTERNAL MEDICINE

## 2020-07-01 PROCEDURE — 84484 ASSAY OF TROPONIN QUANT: CPT

## 2020-07-01 PROCEDURE — 93005 ELECTROCARDIOGRAM TRACING: CPT | Performed by: INTERNAL MEDICINE

## 2020-07-01 PROCEDURE — 85025 COMPLETE CBC W/AUTO DIFF WBC: CPT

## 2020-07-01 PROCEDURE — 90935 HEMODIALYSIS ONE EVALUATION: CPT

## 2020-07-01 PROCEDURE — 93306 TTE W/DOPPLER COMPLETE: CPT | Mod: 26 | Performed by: INTERNAL MEDICINE

## 2020-07-01 PROCEDURE — A9270 NON-COVERED ITEM OR SERVICE: HCPCS | Performed by: INTERNAL MEDICINE

## 2020-07-01 PROCEDURE — 82962 GLUCOSE BLOOD TEST: CPT | Mod: 91

## 2020-07-01 PROCEDURE — 99233 SBSQ HOSP IP/OBS HIGH 50: CPT | Performed by: INTERNAL MEDICINE

## 2020-07-01 PROCEDURE — 700102 HCHG RX REV CODE 250 W/ 637 OVERRIDE(OP): Performed by: INTERNAL MEDICINE

## 2020-07-01 PROCEDURE — 700105 HCHG RX REV CODE 258: Performed by: INTERNAL MEDICINE

## 2020-07-01 PROCEDURE — 93306 TTE W/DOPPLER COMPLETE: CPT

## 2020-07-01 PROCEDURE — 700111 HCHG RX REV CODE 636 W/ 250 OVERRIDE (IP): Performed by: INTERNAL MEDICINE

## 2020-07-01 PROCEDURE — 770022 HCHG ROOM/CARE - ICU (200)

## 2020-07-01 RX ORDER — LACTULOSE 20 G/30ML
30 SOLUTION ORAL
Status: DISCONTINUED | OUTPATIENT
Start: 2020-07-01 | End: 2020-07-22 | Stop reason: HOSPADM

## 2020-07-01 RX ADMIN — METHYLPREDNISOLONE SODIUM SUCCINATE 62.5 MG: 125 INJECTION, POWDER, FOR SOLUTION INTRAMUSCULAR; INTRAVENOUS at 17:28

## 2020-07-01 RX ADMIN — INSULIN HUMAN 2 UNITS: 100 INJECTION, SOLUTION PARENTERAL at 17:39

## 2020-07-01 RX ADMIN — INSULIN HUMAN 2 UNITS: 100 INJECTION, SOLUTION PARENTERAL at 05:54

## 2020-07-01 RX ADMIN — IPRATROPIUM BROMIDE AND ALBUTEROL SULFATE 3 ML: .5; 3 SOLUTION RESPIRATORY (INHALATION) at 22:08

## 2020-07-01 RX ADMIN — INSULIN HUMAN 1 UNITS: 100 INJECTION, SOLUTION PARENTERAL at 20:56

## 2020-07-01 RX ADMIN — CEFTRIAXONE SODIUM 2 G: 2 INJECTION, POWDER, FOR SOLUTION INTRAMUSCULAR; INTRAVENOUS at 06:00

## 2020-07-01 RX ADMIN — HYDROXYZINE HYDROCHLORIDE 25 MG: 25 TABLET, FILM COATED ORAL at 05:51

## 2020-07-01 RX ADMIN — IPRATROPIUM BROMIDE AND ALBUTEROL SULFATE 3 ML: .5; 3 SOLUTION RESPIRATORY (INHALATION) at 03:04

## 2020-07-01 RX ADMIN — IPRATROPIUM BROMIDE AND ALBUTEROL SULFATE 3 ML: .5; 3 SOLUTION RESPIRATORY (INHALATION) at 13:56

## 2020-07-01 RX ADMIN — LEVOTHYROXINE SODIUM 75 MCG: 75 TABLET ORAL at 05:46

## 2020-07-01 RX ADMIN — HEPARIN SODIUM 5000 UNITS: 5000 INJECTION, SOLUTION INTRAVENOUS; SUBCUTANEOUS at 05:40

## 2020-07-01 RX ADMIN — ROSUVASTATIN CALCIUM 10 MG: 20 TABLET, FILM COATED ORAL at 17:28

## 2020-07-01 RX ADMIN — TRAZODONE HYDROCHLORIDE 300 MG: 150 TABLET ORAL at 17:30

## 2020-07-01 RX ADMIN — IPRATROPIUM BROMIDE AND ALBUTEROL SULFATE 3 ML: .5; 3 SOLUTION RESPIRATORY (INHALATION) at 19:23

## 2020-07-01 RX ADMIN — HYDROXYZINE HYDROCHLORIDE 25 MG: 25 TABLET, FILM COATED ORAL at 20:53

## 2020-07-01 RX ADMIN — DULOXETINE HYDROCHLORIDE 60 MG: 60 CAPSULE, DELAYED RELEASE ORAL at 05:45

## 2020-07-01 RX ADMIN — AZITHROMYCIN MONOHYDRATE 500 MG: 250 TABLET ORAL at 05:46

## 2020-07-01 RX ADMIN — INSULIN HUMAN 3 UNITS: 100 INJECTION, SOLUTION PARENTERAL at 12:54

## 2020-07-01 RX ADMIN — OXYCODONE HYDROCHLORIDE 10 MG: 10 TABLET ORAL at 01:23

## 2020-07-01 RX ADMIN — UMECLIDINIUM BROMIDE AND VILANTEROL TRIFENATATE 1 PUFF: 62.5; 25 POWDER RESPIRATORY (INHALATION) at 07:51

## 2020-07-01 RX ADMIN — OXYCODONE HYDROCHLORIDE 10 MG: 10 TABLET ORAL at 14:45

## 2020-07-01 RX ADMIN — IPRATROPIUM BROMIDE AND ALBUTEROL SULFATE 3 ML: .5; 3 SOLUTION RESPIRATORY (INHALATION) at 07:51

## 2020-07-01 RX ADMIN — DOCUSATE SODIUM 50 MG AND SENNOSIDES 8.6 MG 2 TABLET: 8.6; 5 TABLET, FILM COATED ORAL at 17:29

## 2020-07-01 RX ADMIN — DOCUSATE SODIUM 50 MG AND SENNOSIDES 8.6 MG 2 TABLET: 8.6; 5 TABLET, FILM COATED ORAL at 05:45

## 2020-07-01 RX ADMIN — HEPARIN SODIUM 2400 UNITS: 1000 INJECTION, SOLUTION INTRAVENOUS; SUBCUTANEOUS at 09:34

## 2020-07-01 RX ADMIN — FLUTICASONE PROPIONATE 88 MCG: 44 AEROSOL, METERED RESPIRATORY (INHALATION) at 07:51

## 2020-07-01 RX ADMIN — OXYCODONE HYDROCHLORIDE 10 MG: 10 TABLET ORAL at 20:53

## 2020-07-01 RX ADMIN — POLYETHYLENE GLYCOL 3350 1 PACKET: 17 POWDER, FOR SOLUTION ORAL at 17:32

## 2020-07-01 RX ADMIN — METHYLPREDNISOLONE SODIUM SUCCINATE 62.5 MG: 125 INJECTION, POWDER, FOR SOLUTION INTRAMUSCULAR; INTRAVENOUS at 05:42

## 2020-07-01 ASSESSMENT — ENCOUNTER SYMPTOMS
NERVOUS/ANXIOUS: 1
BLOOD IN STOOL: 0
SHORTNESS OF BREATH: 1
WHEEZING: 1
BRUISES/BLEEDS EASILY: 0
ABDOMINAL PAIN: 0
SINUS PAIN: 0
SPUTUM PRODUCTION: 0
BACK PAIN: 1
FEVER: 0
VOMITING: 0
COUGH: 1
CHILLS: 0
NAUSEA: 0
PALPITATIONS: 0
SENSORY CHANGE: 0
SHORTNESS OF BREATH: 0
PND: 1
SPEECH CHANGE: 0
HEMOPTYSIS: 0
FOCAL WEAKNESS: 0
SORE THROAT: 0
DOUBLE VISION: 0
WEIGHT LOSS: 0
HEADACHES: 0
DIARRHEA: 0
BLURRED VISION: 0

## 2020-07-01 NOTE — PROGRESS NOTES
Critical Care Progress Note    Date of admission  6/30/2020    Chief Complaint  69 y.o. female admitted 6/30/2020 with 3 weeks productive cough and worsening CKD    Hospital Course    69 y.o. female with a past medical history of COPD on 4 L of oxygen at home, PFTs on 1/12/2015 which showed an FEV1 of 1.35 54% predicted, diabetes, hypothyroidism, fibromyalgia, hepatitis C, hypertension, CHF, stage IV chronic kidney disease on who presented 6/30/2020 with 3 weeks of worsening respiratory failure with productive cough.  Patient was recently admitted on 6/15/2020 for a COPD exacerbation and pulmonary edema.  She was discharged on Lasix, amlodipine and metoprolol in addition to her prior home regimen.  She had a negative coronavirus test during that stay in addition to another negative coronavirus test following discharge at her assisted living center.  Today the patient was brought in for her dyspnea and found to be hypoxic on her home oxygen settings.  Chest x-ray revealed cardiomegaly without any infiltrate however some increased interstitial opacities.  Coronavirus testing was again negative, EKG showed junctional bradycardia and laboratory analysis showed HAN with a potassium of 6.3.  Patient was medically treated for his hyperkalemia and nephrology was consulted for emergent HD.  Cardiology was also contacted by ERP for EKG changes and recommendations were made to improve metabolic abnormalities.  Patient is to be admitted to the ICU for ongoing care.  The patient denies any significant fever or chills, states that her cough is been increasingly productive and her dyspnea is only transiently improved with her breakthrough nebulizers.  She admits to decreased urine output and denies any chest pain, palpitations.         Interval Problem Update  Reviewed last 24 hour events:    AO x4  Follows well  SR 70s  SBp 120s  UO adequate  I/Os +2.5 L  Weaned off 4 pressors  Chronic pain -> Oxy  HD ongoing now  Tm 98.4  5 lpm  NC  Duo Q4  C3/Azith  Solumedrol    HD today  IS encouraged  Bowel care protocols  Mobilize as tolerated, refusing a first  PT/OT requested  We will keep patient in ICU 1 more day    Serial follow-up  Echocardiogram ongoing  Hemodynamics acceptable  Tolerated dialysis, approximately 1500 cc pulled  Blood pressure actually trending up and oxygenation slightly improved    Review of Systems  Review of Systems   Constitutional: Positive for malaise/fatigue. Negative for chills, fever and weight loss.   HENT: Negative for congestion, sinus pain and sore throat.    Eyes: Negative for blurred vision and double vision.   Respiratory: Positive for cough, shortness of breath and wheezing (Improved). Negative for hemoptysis and sputum production.    Cardiovascular: Positive for leg swelling and PND. Negative for chest pain and palpitations.   Gastrointestinal: Negative for abdominal pain, blood in stool, diarrhea, nausea and vomiting.   Musculoskeletal: Positive for back pain (Chronic) and joint pain (Chronic).   Neurological: Negative for sensory change, speech change, focal weakness and headaches.   Endo/Heme/Allergies: Does not bruise/bleed easily.   Psychiatric/Behavioral: The patient is nervous/anxious (At times).         Vital Signs for last 24 hours   Temp:  [36.7 °C (98.1 °F)-36.9 °C (98.4 °F)] 36.7 °C (98.1 °F)  Pulse:  [65-96] 77  Resp:  [16-24] 20  BP: ()/() 143/103  SpO2:  [89 %-100 %] 92 %    Hemodynamic parameters for last 24 hours       Respiratory Information for the last 24 hours       Physical Exam   Physical Exam  Vitals signs reviewed.   Constitutional:       Appearance: She is morbidly obese. She is ill-appearing. She is not toxic-appearing.      Interventions: Nasal cannula in place.   HENT:      Head: Normocephalic and atraumatic.      Mouth/Throat:      Mouth: Mucous membranes are moist.   Eyes:      General: No scleral icterus.     Extraocular Movements: Extraocular movements intact.       Pupils: Pupils are equal, round, and reactive to light.   Neck:      Vascular: No JVD.   Cardiovascular:      Rate and Rhythm: Normal rate and regular rhythm.      Heart sounds: No murmur. No gallop.       Comments: SR  Pulmonary:      Breath sounds: Rhonchi present. No wheezing or rales.   Abdominal:      General: Bowel sounds are normal.      Palpations: Abdomen is soft. There is no mass.      Tenderness: There is no abdominal tenderness. There is no right CVA tenderness, left CVA tenderness or guarding.   Musculoskeletal:      Right lower leg: Edema present.      Left lower leg: Edema present.   Lymphadenopathy:      Cervical: No cervical adenopathy.   Skin:     General: Skin is warm and dry.      Capillary Refill: Capillary refill takes less than 2 seconds.      Coloration: Skin is not cyanotic.      Nails: There is no clubbing.     Neurological:      General: No focal deficit present.      Mental Status: She is alert and oriented to person, place, and time. Mental status is at baseline.   Psychiatric:         Mood and Affect: Mood normal.         Behavior: Behavior normal. Behavior is cooperative.         Thought Content: Thought content normal.         Medications  Current Facility-Administered Medications   Medication Dose Route Frequency Provider Last Rate Last Dose   • lactulose 20 GM/30ML solution 30 mL  30 mL Oral BID W/MEALS PRN Heber Armstrong M.D.       • NS (BOLUS) infusion 250 mL  250 mL Intravenous DIALYSIS PRN Eric Pompa M.D.       • albumin human 25% solution 12.5 g  12.5 g Intravenous DIALYSIS PRN Eric Pompa M.D. 150 mL/hr at 06/30/20 1513 12.5 g at 06/30/20 1513   • Respiratory Therapy Consult   Nebulization Continuous RT Roger Florence Jr., D.O.       • ipratropium-albuterol (DUONEB) nebulizer solution  3 mL Nebulization Q4HRS (RT) Roger Florence Jr., D.O.   3 mL at 07/01/20 1923   • azithromycin (ZITHROMAX) tablet 500 mg  500 mg Oral DAILY Roger Florence Jr., D.O.   500 mg at 07/01/20  0546   • methylPREDNISolone sod succ (SOLU-MEDROL) 125 MG injection 62.5 mg  62.5 mg Intravenous Q12HRS ROBY Sarmiento Jr..O.   62.5 mg at 07/01/20 1728   • DULoxetine (CYMBALTA) capsule 60 mg  60 mg Oral DAILY ROBY Sarmiento Jr..O.   60 mg at 07/01/20 0545   • hydrOXYzine HCl (ATARAX) tablet 25 mg  25 mg Oral TID PRN Roger Florence Jr. D.O.   25 mg at 07/01/20 2053   • levothyroxine (SYNTHROID) tablet 75 mcg  75 mcg Oral AM ES ROBY Sarmiento Jr..O.   75 mcg at 07/01/20 0546   • lidocaine (LIDODERM) 5 % 1 Patch  1 Patch Transdermal Q24HRS PRN ROBY Sarmiento Jr..O.       • traZODone (DESYREL) tablet 300 mg  300 mg Oral BID ROBY Sarmiento Jr..O.   300 mg at 07/01/20 1730   • rosuvastatin (CRESTOR) tablet 10 mg  10 mg Oral Q EVENING Roger Florence Jr. D.O.   10 mg at 07/01/20 1728   • senna-docusate (PERICOLACE or SENOKOT S) 8.6-50 MG per tablet 2 Tab  2 Tab Oral BID ROBY Sarmiento Jr..O.   2 Tab at 07/01/20 1729    And   • polyethylene glycol/lytes (MIRALAX) PACKET 1 Packet  1 Packet Oral QDAY PRN ROBY Sarmiento Jr..O.   1 Packet at 07/01/20 1732    And   • magnesium hydroxide (MILK OF MAGNESIA) suspension 30 mL  30 mL Oral QDAY PRN ROBY Sarmiento Jr..O.        And   • bisacodyl (DULCOLAX) suppository 10 mg  10 mg Rectal QDAY PRN ROBY Sarmiento Jr..O.       • heparin injection 5,000 Units  5,000 Units Subcutaneous Q8HRS ROBY Sarmiento Jr..O.   5,000 Units at 07/01/20 0540   • ondansetron (ZOFRAN) syringe/vial injection 4 mg  4 mg Intravenous Q4HRS PRN ROBY Sarmiento Jr..O.       • ondansetron (ZOFRAN ODT) dispertab 4 mg  4 mg Oral Q4HRS PRN Roger Florence Jr., D.O.       • Pharmacy Consult Request ...Pain Management Review 1 Each  1 Each Other PHARMACY TO DOSE ROBY Sarmiento Jr..ISABELLE        And   • oxyCODONE immediate-release (ROXICODONE) tablet 5 mg  5 mg Oral Q3HRS PRN ROBY Sarmiento Jr..O.   5 mg at 06/30/20 1541    And   • oxyCODONE immediate-release  (ROXICODONE) tablet 10 mg  10 mg Oral Q3HRS PRN Roger Florence Jr. D.O.   10 mg at 07/01/20 2053    And   • morphine (pf) 4 MG/ML injection 4 mg  4 mg Intravenous Q3HRS PRN Roger Florence Jr. D.O.       • lactated ringers infusion (BOLUS)  500 mL Intravenous Once PRN ROBY Sarmiento Jr..O.       • cefTRIAXone (ROCEPHIN) 2 g in  mL IVPB  2 g Intravenous Q24HRS ROBY Sarmiento Jr..O.   Stopped at 07/01/20 0630   • umeclidinium-vilanterol (ANORO ELLIPTA) inhaler 1 Puff  1 Puff Inhalation QDAILY (RT) ROBY Sarmiento Jr..OMelonie   1 Puff at 07/01/20 0751   • fluticasone (FLOVENT HFA) 44 MCG/ACT inhaler 88 mcg  2 Puff Inhalation DAILY ROBY Sarmiento Jr..O.   88 mcg at 07/01/20 0751   • ipratropium-albuterol (DUONEB) nebulizer solution  3 mL Nebulization Q2HRS PRN (RT) Heber Armstrong M.D.       • heparin injection 2,400 Units  2,400 Units Intracatheter ACUTE DIALYSIS PRN Eric Pompa M.D.   2,400 Units at 07/01/20 0934   • insulin regular (HUMULIN R) injection 1-6 Units  1-6 Units Subcutaneous 4X/DAY ACHS Bob Glover M.D.   1 Units at 07/01/20 2056    And   • glucose 4 g chewable tablet 16 g  16 g Oral Q15 MIN PRN Bob Glover M.D.        And   • dextrose 50% (D50W) injection 50 mL  50 mL Intravenous Q15 MIN PRN Bob Glover M.D.           Fluids    Intake/Output Summary (Last 24 hours) at 7/1/2020 2143  Last data filed at 7/1/2020 1800  Gross per 24 hour   Intake 1255 ml   Output 2070 ml   Net -815 ml       Laboratory  Recent Labs     06/30/20  1435   ISTATAPH 7.246*   ISTATAPCO2 45.0*   ISTATAPO2 72   ISTATATCO2 21   KUQAEAN8JKF 91*   ISTATARTHCO3 19.6   ISTATARTBE -7*   ISTATTEMP 36.1 C   ISTATFIO2 37   ISTATSPEC Arterial   ISTATAPHTC 7.259*   [id number] 67         Recent Labs     06/30/20  0849  07/01/20  0015 07/01/20  0420 07/01/20  1542   SODIUM 126*   < > 128* 129* 132*   POTASSIUM 6.3*   < > 5.4 5.2 4.4   CHLORIDE 91*   < > 88* 90* 93*   CO2 19*   < > 23 23 25   BUN 97*   < >  66* 68* 43*   CREATININE 5.70*   < > 3.74* 4.05* 2.81*   MAGNESIUM 2.8*  --   --   --   --    PHOSPHORUS 9.1*  --   --   --   --    CALCIUM 9.0   < > 8.5 8.6 8.4*    < > = values in this interval not displayed.     Recent Labs     06/30/20  0849  07/01/20  0015 07/01/20  0420 07/01/20  1542   ALTSGPT 356*  --   --   --   --    ASTSGOT 290*  --   --   --   --    ALKPHOSPHAT 107*  --   --   --   --    TBILIRUBIN 0.2  --   --   --   --    GLUCOSE 114*   < > 265* 230* 259*    < > = values in this interval not displayed.     Recent Labs     06/30/20  0849 06/30/20  1630 07/01/20  0420   WBC 13.7* 16.9* 15.0*   NEUTSPOLYS 87.00* 94.50* 95.10*   LYMPHOCYTES 5.20* 2.20* 1.40*   MONOCYTES 6.20 0.90 2.50   EOSINOPHILS 0.30 0.10 0.00   BASOPHILS 0.20 0.20 0.20   ASTSGOT 290*  --   --    ALTSGPT 356*  --   --    ALKPHOSPHAT 107*  --   --    TBILIRUBIN 0.2  --   --      Recent Labs     06/30/20  0849 06/30/20 1630 07/01/20  0420   RBC 2.39* 2.31* 2.41*   HEMOGLOBIN 7.3* 6.9* 7.2*   HEMATOCRIT 23.6* 22.3* 22.5*   PLATELETCT 251 310 176   PROTHROMBTM 15.0*  --   --    INR 1.14*  --   --        Imaging  X-Ray:  I have personally reviewed the images and compared with prior images.  EKG:  I have personally reviewed the images and compared with prior images.  CT:    Reviewed  Echo:   Reviewed    Assessment/Plan  Hyperkalemia- (present on admission)  Assessment & Plan  Associated with acute on chronic kidney disease  Probable BRASH  Junctional bradycardia on EKG, ? Consequence of hyperkalemia-resolved  Nephrology consult noted  Status post urgent HD-improved    Acute renal failure superimposed on stage 4 chronic kidney disease (HCC)- (present on admission)  Assessment & Plan  Stat nephrology consult  HD for hyperkalemia  Renal dose meds, avoid nephrotoxins  Strict I/Os  Follow renal function    Goals of care, counseling/discussion- (present on admission)  Assessment & Plan  Discussed CODE STATUS with patient, she has a POLST form  stating DNR/DNI however patient states she would want short-term intubation if necessary for a reversible cause.    She would like to update her POLST form prior to discharge    Leukocytosis- (present on admission)  Assessment & Plan  Productive cough, chest x-ray nonspecific-improved  Continue pneumonia coverage  Monitor      Acute exacerbation of chronic obstructive pulmonary disease (COPD) (HCC)- (present on admission)  Assessment & Plan  On 4 L nasal cannula 24/7  Scheduled nebs  RT/O2 Protocols  Titrate supplemental FiO2 to maintain SpO2 >92%  Continue Anoro Ellipta  IV steroids  Antimicrobials for change in sputum  Low threshold to advance to noninvasive positive pressure ventilation or intubation if necessary    DM type 2, uncontrolled, with renal complications (HCC)- (present on admission)  Assessment & Plan  Goal blood glucose 120-180  sliding scale insulin, accuchecks  Hold Lantus given renal failure  hypoglycemia protocol  A1c 6.2 two weeks ago      Junctional bradycardia- (present on admission)  Assessment & Plan  Resolved after dialysis and multiple metabolic treatments  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 (on B-blocker and Ca channel blocker) vs HyperK  Continue cardiac monitoring  Echo pending  Trend trop, suspect related to hypotension and demand ischemia  Monitor for need to pace, no so far  Monitor for need to consult Cardiology    Acute on chronic respiratory failure with hypoxia (HCC)- (present on admission)  Assessment & Plan  Secondary to COPD exacerbation  See above    Anemia- (present on admission)  Assessment & Plan  Acute on chronic  Baseline hemoglobin around 8  No signs of active bleeding  Continue to monitor  Transfuse for hemoglobin less than 7    Acute on chronic diastolic congestive heart failure (HCC)- (present on admission)  Assessment & Plan  Small amount of edema noted on chest x-ray, pro-BNP 7661  Diuresis when more stable  Restart heart failure regimen when clinically appropriate  Consider  cardiology consultation as indicated    Pulmonary HTN (HCC)- (present on admission)  Assessment & Plan  Likely due to COPD and obesity/EDITH  RVSP 60 mmHg  Judicious IV fluid use  Diuresis when able    Tobacco dependence- (present on admission)  Assessment & Plan  Nicotine replacement protocols    Chronic use of opiate drug for therapeutic purpose- (present on admission)  Assessment & Plan  Care with opiates given current state    EDITH (obstructive sleep apnea)- (present on admission)  Assessment & Plan  Monitor for the need to start nocturnal BiPAP, acceptable sats last night on oxygen therapy    Hypothyroidism- (present on admission)  Assessment & Plan  Continue Synthroid 75 mcg PO qDay  TSH normal       VTE:  Heparin  Ulcer: Not Indicated  Lines: Central Line  Ongoing indication addressed, Arterial Line  Ongoing indication addressed and Mckeon Catheter  Ongoing indication addressed    I have performed a physical exam and reviewed and updated ROS and Plan today (7/1/2020). In review of yesterday's note (6/30/2020), there are no changes except as documented above.     Discussed patient condition and risk of morbidity and/or mortality with RN, RT, Pharmacy, Charge nurse / hot rounds, Patient and nephrology

## 2020-07-01 NOTE — ASSESSMENT & PLAN NOTE
Likely due to COPD and obesity/EDITH  RVSP 60 mmHg  Judicious IV fluid use  Diuresis -> hemodialysis  CNOX an outpatient to facilitate trading home O2?  Patient intolerant of CPAP

## 2020-07-01 NOTE — ASSESSMENT & PLAN NOTE
Discussed CODE STATUS with patient, she has a POLST form stating DNR/DNI however patient states she would want short-term intubation if necessary for a reversible cause.    She would like to update her POLST form prior to discharge

## 2020-07-01 NOTE — ASSESSMENT & PLAN NOTE
Acute on chronic  Baseline hemoglobin around 8  No signs of active bleeding  Continue to monitor  Transfuse for hemoglobin less than 7

## 2020-07-01 NOTE — ASSESSMENT & PLAN NOTE
Goal blood glucose 120-180  sliding scale insulin, accuchecks  Re-assess long-acting insulin use daily  hypoglycemia protocols  A1c 6.2 two weeks ago

## 2020-07-01 NOTE — PROGRESS NOTES
Pt admitted to room at 1200.  Orders implemented and reviewed.    12:00-15:00-HD cath placed, art line placed, pt hypotensive requiring extensive (4) pressor support.     15:00-HD done at bedside. Pt received 1 unit of blood.    17:00-Pt off all pressors (see MAR).

## 2020-07-01 NOTE — CARE PLAN
"  Problem: Bowel/Gastric:  Goal: Will not experience complications related to bowel motility  Outcome: PROGRESSING SLOWER THAN EXPECTED  Intervention: Assess baseline bowel pattern  Note: Pt states her baseline bowel pattern is \"once a week,\" and that she has problems with \"constipation.\"  Pt states she does not remember when her last BM was, and that her HCP has given her lactulose to help.    Intervention: Implement interventions to promote bowel evacuation if inadequate bowel movements in past 48 hours  Note: I suggested Miralax, to which pt was amenable.  I also attempted to discuss with pt the importance of fluid intake and ambulation to assist with bowel motility.  Pt initially agreed to mobilize to EOB, but then refused and move to settle back into bed less than residential through the attempt.  Pt was otherwise dismissive of both my attempts to glean her understanding of interventions to promote normal bowel motility, and my attempts to further educate her on such.  Intervention: Implement Bowel Protocol, if applicable  Note: Bowel Protocol initiated.  See MAR.     Problem: Pain Management  Goal: Pain level will decrease to patient's comfort goal  Outcome: PROGRESSING SLOWER THAN EXPECTED  Intervention: Follow pain managment plan developed in collaboration with patient and Interdisciplinary Team  Note: See MAR.  Intervention: Educate and implement non-pharmacologic comfort measures. Examples: relaxation, distration, play therapy, activity therapy, massage, etc.  Note: Attempted to talk to pt about methods other than opioids to address pain, including, but not limited to either heat/ice, massage, repositioning for neck and back pain.  Distraction, breathing techniques and/or guided imagery for all pain pt is complaining of.  Inquired if pt had tried interventions other than opioids, pt would not engage in conversation, or even acknowledge this RN's queries and/or suggestions r/t non-pharmacologic comfort " measures.

## 2020-07-01 NOTE — PROGRESS NOTES
HD treatment today # 1 today from [id number] using newly placed noel cvc..Treatment tolerated well but with clotting of venous chamber within 30 mins of tx.Got weaned off all 4 pressors in the last 30 mins of treatment.Transfused 1 unit prbc with treatment with no adverse reaction.No UF removed.Dr Pompa all aware.CVC locked with heparin.Report given to primary Rn.

## 2020-07-01 NOTE — ASSESSMENT & PLAN NOTE
Stat nephrology consult  HD for hyperkalemia  Renal dose meds, avoid nephrotoxins  Strict I/Os  Follow renal function - serial BMP

## 2020-07-01 NOTE — ASSESSMENT & PLAN NOTE
Monitor for the need to start nocturnal BiPAP, acceptable sats last night on oxygen therapy  She has been told she has EDITH but she cannot tolerate CPAP  Weight Loss encouraged

## 2020-07-01 NOTE — CARE PLAN
Problem: Oxygenation:  Goal: Maintain adequate oxygenation dependent on patient condition  Outcome: PROGRESSING AS EXPECTED     Problem: Bronchoconstriction:  Goal: Improve in air movement and diminished wheezing  Outcome: PROGRESSING AS EXPECTED     Problem: Ventilation Defect:  Goal: Ability to achieve and maintain unassisted ventilation or tolerate decreased levels of ventilator support  Outcome: PROGRESSING AS EXPECTED       Respiratory Update    Q4H Duoneb, QID FV, BID Flovent, Qday Anoro Ellipta  PRN BIPAP, wore for 30mins this shift  5L via NC    Pt tolerating current treatments well with no adverse reactions.

## 2020-07-01 NOTE — PROGRESS NOTES
Nephrology Daily Progress Note    Date of Service  7/1/2020    Chief Complaint  69 y.o. female with a history of CKD 3, COPD who presented 6/30/2020 with shortness of breath, found to have bradycardia, hypotension, and HAN.    Interval Problem Update  7/1 -patient had dialysis yesterday with no fluid removed, and resolution of shock on completion of dialysis.  Patient had 500 mL of urine output documented last 24 hours.  Patient had dialysis again this morning with 1 L removed.  Patient says her breathing is better.  Denies chest pain, shortness of breath at this time.    Review of Systems  Review of Systems   Constitutional: Negative for fever.   Respiratory: Negative for shortness of breath.    Cardiovascular: Negative for chest pain.   Gastrointestinal: Negative for abdominal pain.   All other systems reviewed and are negative.       Physical Exam  Temp:  [35.7 °C (96.3 °F)-36.9 °C (98.4 °F)] 36.7 °C (98.1 °F)  Pulse:  [65-91] 75  Resp:  [16-32] 17  BP: ()/() 143/103  SpO2:  [87 %-99 %] 99 %    Physical Exam   Constitutional: She is oriented to person, place, and time. She appears well-developed. No distress.   HENT:   Mouth/Throat: Oropharynx is clear and moist. No oropharyngeal exudate.   Eyes: EOM are normal. No scleral icterus.   Neck: No tracheal deviation present.   Cardiovascular: Normal heart sounds.   No murmur heard.  Pulmonary/Chest: Effort normal. No stridor. She has wheezes. She has no rales.   Abdominal: Soft. There is no abdominal tenderness.   Obese    Musculoskeletal: Normal range of motion.         General: Edema (trace LE) present.   Neurological: She is alert and oriented to person, place, and time.   Skin: Skin is warm and dry.   Psychiatric: She has a normal mood and affect.   Access: Right IJ temporary dialysis catheter.      Fluids    Intake/Output Summary (Last 24 hours) at 7/1/2020 1500  Last data filed at 7/1/2020 1000  Gross per 24 hour   Intake 3503.07 ml   Output 2115 ml    Net 1388.07 ml       Laboratory  Labs reviewed, pertinent labs below.  Recent Labs     06/30/20  0849 06/30/20  1630 07/01/20  0420   WBC 13.7* 16.9* 15.0*   RBC 2.39* 2.31* 2.41*   HEMOGLOBIN 7.3* 6.9* 7.2*   HEMATOCRIT 23.6* 22.3* 22.5*   MCV 98.3* 96.5 93.4   MCH 30.1 29.9 29.9   MCHC 30.6* 30.9* 32.0*   RDW 52.5* 50.4* 52.8*   PLATELETCT 251 310 176   MPV 10.2 9.9 9.7     Recent Labs     06/30/20 2028 07/01/20  0015 07/01/20  0420   SODIUM 130* 128* 129*   POTASSIUM 5.3 5.4 5.2   CHLORIDE 89* 88* 90*   CO2 23 23 23   GLUCOSE 221* 265* 230*   BUN 62* 66* 68*   CREATININE 3.76* 3.74* 4.05*   CALCIUM 8.8 8.5 8.6     Recent Labs     06/30/20  0849   INR 1.14*           URINALYSIS:  Lab Results   Component Value Date/Time    COLORURINE DK Yellow 06/15/2019 0245    CLARITY Turbid (A) 06/15/2019 0245    SPECGRAVITY 1.020 06/15/2019 0245    PHURINE 5.0 06/15/2019 0245    KETONES Trace (A) 06/15/2019 0245    PROTEINURIN 300 (A) 06/15/2019 0245    BILIRUBINUR Negative 06/15/2019 0245    UROBILU 1.0 06/15/2019 0245    NITRITE Negative 06/15/2019 0245    LEUKESTERAS Negative 06/15/2019 0245    OCCULTBLOOD Negative 06/15/2019 0245     UPC  No results found for: TOTPROTUR No results found for: CREATININEU      Imaging reviewed  US-RUQ   Final Result      Cholelithiasis with mild gallbladder wall thickening but no positive sonographic Troncoso sign. This could indicate chronic cholecystitis or incomplete distention      DX-CHEST-PORTABLE (1 VIEW)   Final Result      1.  Satisfactory position of new right IJV multilumen dialysis catheter. No pneumothorax identified.      2.  Unchanged mild volume overload pattern.      DX-CHEST-PORTABLE (1 VIEW)   Final Result      No acute cardiac or pulmonary abnormality is noted. Stable cardiomegaly with increased interstitial opacity diffusely.      EC-ECHOCARDIOGRAM COMPLETE W/O CONT    (Results Pending)         Current Facility-Administered Medications   Medication Dose Route Frequency  Provider Last Rate Last Dose   • lactulose 20 GM/30ML solution 30 mL  30 mL Oral BID W/MEALS PRN Heber Armstrong M.D.       • NS (BOLUS) infusion 250 mL  250 mL Intravenous DIALYSIS PRN Eric Pompa M.D.       • albumin human 25% solution 12.5 g  12.5 g Intravenous DIALYSIS PRN Eric Pompa M.D. 150 mL/hr at 06/30/20 1513 12.5 g at 06/30/20 1513   • Respiratory Therapy Consult   Nebulization Continuous RT Roger Florence Jr., D.O.       • ipratropium-albuterol (DUONEB) nebulizer solution  3 mL Nebulization Q4HRS (RT) ROBY Sarmiento Jr..O.   3 mL at 07/01/20 1356   • azithromycin (ZITHROMAX) tablet 500 mg  500 mg Oral DAILY Roger Florence Jr. D.O.   500 mg at 07/01/20 0546   • methylPREDNISolone sod succ (SOLU-MEDROL) 125 MG injection 62.5 mg  62.5 mg Intravenous Q12HRS Roger Florence Jr. D.O.   62.5 mg at 07/01/20 0542   • DULoxetine (CYMBALTA) capsule 60 mg  60 mg Oral DAILY Roger Florence Jr., D.O.   60 mg at 07/01/20 0545   • hydrOXYzine HCl (ATARAX) tablet 25 mg  25 mg Oral TID PRN Roger Florence Jr., D.O.   25 mg at 07/01/20 0551   • levothyroxine (SYNTHROID) tablet 75 mcg  75 mcg Oral AM ES Roger Florence Jr. D.O.   75 mcg at 07/01/20 0546   • lidocaine (LIDODERM) 5 % 1 Patch  1 Patch Transdermal Q24HRS PRN Roger Florence Jr., D.O.       • traZODone (DESYREL) tablet 300 mg  300 mg Oral BID Roger Florence Jr. D.O.   Stopped at 07/01/20 0547   • rosuvastatin (CRESTOR) tablet 10 mg  10 mg Oral Q EVENING Roger Florence Jr., D.O.   10 mg at 06/30/20 1729   • senna-docusate (PERICOLACE or SENOKOT S) 8.6-50 MG per tablet 2 Tab  2 Tab Oral BID ROBY Sarmiento Jr..O.   2 Tab at 07/01/20 0545    And   • polyethylene glycol/lytes (MIRALAX) PACKET 1 Packet  1 Packet Oral QDAY PRN Roger Florence Jr. D.O.   1 Packet at 06/30/20 2219    And   • magnesium hydroxide (MILK OF MAGNESIA) suspension 30 mL  30 mL Oral QDAY PRN ROBY Sarmiento Jr..O.        And   • bisacodyl (DULCOLAX) suppository 10 mg  10  mg Rectal QDAY PRN ROBY Sarmiento Jr..O.       • heparin injection 5,000 Units  5,000 Units Subcutaneous Q8HRS ROBY Sarmiento Jr..O.   5,000 Units at 07/01/20 0540   • ondansetron (ZOFRAN) syringe/vial injection 4 mg  4 mg Intravenous Q4HRS PRN ROBY Sarmiento Jr..O.       • ondansetron (ZOFRAN ODT) dispertab 4 mg  4 mg Oral Q4HRS PRN ROBY Sarmiento Jr..O.       • Pharmacy Consult Request ...Pain Management Review 1 Each  1 Each Other PHARMACY TO DOSE ROBY Sarmiento Jr..LANCE.        And   • oxyCODONE immediate-release (ROXICODONE) tablet 5 mg  5 mg Oral Q3HRS PRN ROBY Sarmiento Jr..O.   5 mg at 06/30/20 1541    And   • oxyCODONE immediate-release (ROXICODONE) tablet 10 mg  10 mg Oral Q3HRS PRN ROBY Sarmiento Jr..O.   10 mg at 07/01/20 1445    And   • morphine (pf) 4 MG/ML injection 4 mg  4 mg Intravenous Q3HRS PRN ROBY Sarmiento Jr..O.       • lactated ringers infusion (BOLUS)  500 mL Intravenous Once PRN ROBY Sarmiento Jr..O.       • cefTRIAXone (ROCEPHIN) 2 g in  mL IVPB  2 g Intravenous Q24HRS ROBY Sarmiento Jr..O.   Stopped at 07/01/20 0630   • umeclidinium-vilanterol (ANORO ELLIPTA) inhaler 1 Puff  1 Puff Inhalation QDAILY (RT) ROBY Sarmiento Jr..O.   1 Puff at 07/01/20 0751   • fluticasone (FLOVENT HFA) 44 MCG/ACT inhaler 88 mcg  2 Puff Inhalation DAILY ROBY Sarmiento Jr..O.   88 mcg at 07/01/20 0751   • ipratropium-albuterol (DUONEB) nebulizer solution  3 mL Nebulization Q2HRS PRN (RT) Heber Armstrong M.D.       • heparin injection 2,400 Units  2,400 Units Intracatheter ACUTE DIALYSIS PRN Eric Pompa M.D.   2,400 Units at 07/01/20 0934   • insulin regular (HUMULIN R) injection 1-6 Units  1-6 Units Subcutaneous 4X/DAY RUSS Glover M.D.   3 Units at 07/01/20 1254    And   • glucose 4 g chewable tablet 16 g  16 g Oral Q15 MIN PRN Bob Glover M.D.        And   • dextrose 50% (D50W) injection 50 mL  50 mL Intravenous Q15 MIN PRN Bob P. Young,  M.D.             Assessment/Plan  69 y.o. female with a history of CKD 3, COPD who presented 6/30/2020 with shortness of breath, found to have bradycardia, hypotension, and HAN.     1.  HAN on CKD stage III.    Oliguric.  The patient's baseline creatinine runs between 1.8 and 2.2.  On admission creatinine was up to 5.7.    Given the patient's cardiogenic shock, she might have ongoing ATN.  Patient started on dialysis 6/30/2020.  Dialysis session #2 done today.  We will monitor daily for dialysis needs, and likely plan on dialysis 3 times a week until the patient recovers kidney function.  Avoid nephrotoxins.  Check labs daily.    2.  Bradycardia, present on admission, now improved.  Unclear etiology.  Hold beta-blockers and calcium channel blockers.  Defer further management to primary team and cardiology.     3.  Shortness of breath on admission, possible COPD exacerbation, versus fluid overload from acute kidney injury.    We will plan on ultrafiltration with dialysis as tolerated.  Limit IV fluids.     4.  Hyponatremia, slightly improving.  Limit hypotonic fluids.  Check labs daily.     5.  Hyperkalemia, improved with dialysis.  Likely due to HAN.  Low potassium diet.  Check labs daily.     6.  Metabolic acidosis, improved with dialysis.  Check labs daily.     7.  Normocytic anemia, Slightly improved.  Check iron studies.  Check CBC daily.     8.    Cardiogenic shock, likely due to hemodynamic bradycardia.  Now improved.     Following.      Eric Pompa MD  Nephrology

## 2020-07-01 NOTE — ASSESSMENT & PLAN NOTE
Resolved after dialysis and multiple metabolic treatments - no recurrence  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 (on B-blocker and Ca channel blocker) vs HyperK  Continue cardiac monitoring  Echo pending  Trend trop, suspect related to hypotension and demand ischemia  Monitor for need to pace, no so far  Monitor for need to consult Cardiology

## 2020-07-01 NOTE — ASSESSMENT & PLAN NOTE
On 4 L nasal cannula 24/7  Scheduled nebs  RT/O2 Protocols  Titrate supplemental FiO2 to maintain SpO2 >92%  Continue Anoro Ellipta  Spacer as appropriate  Use IV steroid dose today, switch to oral in the zone tomorrow and taper off over 1 week  Antimicrobials for change in sputum 5-7-day course then discontinue  Low threshold to advance to noninvasive positive pressure ventilation or intubation if necessary -well-tolerated unfortunately per patient-flow nasal cannula however was tolerated over auto BiPAP

## 2020-07-01 NOTE — ASSESSMENT & PLAN NOTE
Resolved HD  Associated with acute on chronic kidney disease  Probable BRASH  Junctional bradycardia on EKG, ? Consequence of hyperkalemia-resolved  Nephrology consult noted  Status post urgent HD-improved

## 2020-07-01 NOTE — PROGRESS NOTES
Hemodialysis done today, started @ 0757 and ended @ 1028 with net UF= 1000ml. Report given to SHITAL Peterson. See flow sheet for details.

## 2020-07-01 NOTE — ASSESSMENT & PLAN NOTE
Small amount of edema noted on chest x-ray, pro-BNP  intially 7661  Diuresis when more stable -> HD  Restart heart failure regimen when clinically appropriate  Consider cardiology consultation as indicated

## 2020-07-02 ENCOUNTER — PATIENT OUTREACH (OUTPATIENT)
Dept: HEALTH INFORMATION MANAGEMENT | Facility: OTHER | Age: 70
End: 2020-07-02

## 2020-07-02 PROBLEM — B18.2 CHRONIC HEPATITIS C WITHOUT HEPATIC COMA (HCC): Status: ACTIVE | Noted: 2020-07-02

## 2020-07-02 LAB
ANION GAP SERPL CALC-SCNC: 11 MMOL/L (ref 7–16)
BASOPHILS # BLD AUTO: 0.1 % (ref 0–1.8)
BASOPHILS # BLD: 0.02 K/UL (ref 0–0.12)
BUN SERPL-MCNC: 57 MG/DL (ref 8–22)
CALCIUM SERPL-MCNC: 8.5 MG/DL (ref 8.5–10.5)
CHLORIDE SERPL-SCNC: 87 MMOL/L (ref 96–112)
CO2 SERPL-SCNC: 25 MMOL/L (ref 20–33)
CREAT SERPL-MCNC: 3.24 MG/DL (ref 0.5–1.4)
EOSINOPHIL # BLD AUTO: 0 K/UL (ref 0–0.51)
EOSINOPHIL NFR BLD: 0 % (ref 0–6.9)
ERYTHROCYTE [DISTWIDTH] IN BLOOD BY AUTOMATED COUNT: 54.1 FL (ref 35.9–50)
GLUCOSE BLD-MCNC: 155 MG/DL (ref 65–99)
GLUCOSE BLD-MCNC: 170 MG/DL (ref 65–99)
GLUCOSE BLD-MCNC: 190 MG/DL (ref 65–99)
GLUCOSE BLD-MCNC: 229 MG/DL (ref 65–99)
GLUCOSE SERPL-MCNC: 218 MG/DL (ref 65–99)
HCT VFR BLD AUTO: 25.5 % (ref 37–47)
HGB BLD-MCNC: 8 G/DL (ref 12–16)
IMM GRANULOCYTES # BLD AUTO: 0.16 K/UL (ref 0–0.11)
IMM GRANULOCYTES NFR BLD AUTO: 0.7 % (ref 0–0.9)
LYMPHOCYTES # BLD AUTO: 0.29 K/UL (ref 1–4.8)
LYMPHOCYTES NFR BLD: 1.3 % (ref 22–41)
MCH RBC QN AUTO: 30.1 PG (ref 27–33)
MCHC RBC AUTO-ENTMCNC: 31.4 G/DL (ref 33.6–35)
MCV RBC AUTO: 95.9 FL (ref 81.4–97.8)
MONOCYTES # BLD AUTO: 0.49 K/UL (ref 0–0.85)
MONOCYTES NFR BLD AUTO: 2.1 % (ref 0–13.4)
NEUTROPHILS # BLD AUTO: 22.03 K/UL (ref 2–7.15)
NEUTROPHILS NFR BLD: 95.8 % (ref 44–72)
NRBC # BLD AUTO: 0 K/UL
NRBC BLD-RTO: 0 /100 WBC
PLATELET # BLD AUTO: 225 K/UL (ref 164–446)
PMV BLD AUTO: 9.4 FL (ref 9–12.9)
POTASSIUM SERPL-SCNC: 4.5 MMOL/L (ref 3.6–5.5)
RBC # BLD AUTO: 2.66 M/UL (ref 4.2–5.4)
SODIUM SERPL-SCNC: 123 MMOL/L (ref 135–145)
WBC # BLD AUTO: 23 K/UL (ref 4.8–10.8)

## 2020-07-02 PROCEDURE — 700111 HCHG RX REV CODE 636 W/ 250 OVERRIDE (IP)

## 2020-07-02 PROCEDURE — 99233 SBSQ HOSP IP/OBS HIGH 50: CPT | Performed by: INTERNAL MEDICINE

## 2020-07-02 PROCEDURE — 80048 BASIC METABOLIC PNL TOTAL CA: CPT

## 2020-07-02 PROCEDURE — 700111 HCHG RX REV CODE 636 W/ 250 OVERRIDE (IP): Performed by: INTERNAL MEDICINE

## 2020-07-02 PROCEDURE — 700105 HCHG RX REV CODE 258: Performed by: INTERNAL MEDICINE

## 2020-07-02 PROCEDURE — 700101 HCHG RX REV CODE 250: Performed by: INTERNAL MEDICINE

## 2020-07-02 PROCEDURE — 90935 HEMODIALYSIS ONE EVALUATION: CPT

## 2020-07-02 PROCEDURE — 700102 HCHG RX REV CODE 250 W/ 637 OVERRIDE(OP): Performed by: HOSPITALIST

## 2020-07-02 PROCEDURE — A9270 NON-COVERED ITEM OR SERVICE: HCPCS | Performed by: HOSPITALIST

## 2020-07-02 PROCEDURE — 82962 GLUCOSE BLOOD TEST: CPT

## 2020-07-02 PROCEDURE — 5A1D70Z PERFORMANCE OF URINARY FILTRATION, INTERMITTENT, LESS THAN 6 HOURS PER DAY: ICD-10-PCS | Performed by: INTERNAL MEDICINE

## 2020-07-02 PROCEDURE — 700102 HCHG RX REV CODE 250 W/ 637 OVERRIDE(OP): Performed by: INTERNAL MEDICINE

## 2020-07-02 PROCEDURE — A9270 NON-COVERED ITEM OR SERVICE: HCPCS | Performed by: INTERNAL MEDICINE

## 2020-07-02 PROCEDURE — 85025 COMPLETE CBC W/AUTO DIFF WBC: CPT

## 2020-07-02 PROCEDURE — 94664 DEMO&/EVAL PT USE INHALER: CPT

## 2020-07-02 PROCEDURE — 770020 HCHG ROOM/CARE - TELE (206)

## 2020-07-02 PROCEDURE — 94640 AIRWAY INHALATION TREATMENT: CPT

## 2020-07-02 PROCEDURE — 99233 SBSQ HOSP IP/OBS HIGH 50: CPT | Performed by: HOSPITALIST

## 2020-07-02 RX ORDER — HEPARIN SODIUM 1000 [USP'U]/ML
INJECTION, SOLUTION INTRAVENOUS; SUBCUTANEOUS
Status: COMPLETED
Start: 2020-07-02 | End: 2020-07-02

## 2020-07-02 RX ORDER — OXYCODONE HYDROCHLORIDE 10 MG/1
10 TABLET ORAL 3 TIMES DAILY PRN
Status: DISCONTINUED | OUTPATIENT
Start: 2020-07-02 | End: 2020-07-22 | Stop reason: HOSPADM

## 2020-07-02 RX ORDER — HEPARIN SODIUM 1000 [USP'U]/ML
2000 INJECTION, SOLUTION INTRAVENOUS; SUBCUTANEOUS
Status: DISCONTINUED | OUTPATIENT
Start: 2020-07-02 | End: 2020-07-17

## 2020-07-02 RX ORDER — PREDNISONE 20 MG/1
40 TABLET ORAL DAILY
Status: DISCONTINUED | OUTPATIENT
Start: 2020-07-03 | End: 2020-07-03

## 2020-07-02 RX ORDER — IPRATROPIUM BROMIDE AND ALBUTEROL SULFATE 2.5; .5 MG/3ML; MG/3ML
3 SOLUTION RESPIRATORY (INHALATION)
Status: DISCONTINUED | OUTPATIENT
Start: 2020-07-02 | End: 2020-07-06

## 2020-07-02 RX ADMIN — HEPARIN SODIUM 2000 UNITS: 1000 INJECTION, SOLUTION INTRAVENOUS; SUBCUTANEOUS at 09:18

## 2020-07-02 RX ADMIN — INSULIN HUMAN 1 UNITS: 100 INJECTION, SOLUTION PARENTERAL at 08:44

## 2020-07-02 RX ADMIN — METHYLPREDNISOLONE SODIUM SUCCINATE 62.5 MG: 125 INJECTION, POWDER, FOR SOLUTION INTRAMUSCULAR; INTRAVENOUS at 04:59

## 2020-07-02 RX ADMIN — TRAZODONE HYDROCHLORIDE 300 MG: 150 TABLET ORAL at 05:40

## 2020-07-02 RX ADMIN — IPRATROPIUM BROMIDE AND ALBUTEROL SULFATE 3 ML: .5; 3 SOLUTION RESPIRATORY (INHALATION) at 19:55

## 2020-07-02 RX ADMIN — CEFTRIAXONE SODIUM 2 G: 2 INJECTION, POWDER, FOR SOLUTION INTRAMUSCULAR; INTRAVENOUS at 05:00

## 2020-07-02 RX ADMIN — ROSUVASTATIN CALCIUM 10 MG: 20 TABLET, FILM COATED ORAL at 16:52

## 2020-07-02 RX ADMIN — HEPARIN SODIUM 2400 UNITS: 1000 INJECTION, SOLUTION INTRAVENOUS; SUBCUTANEOUS at 12:12

## 2020-07-02 RX ADMIN — POLYETHYLENE GLYCOL 3350 1 PACKET: 17 POWDER, FOR SOLUTION ORAL at 05:00

## 2020-07-02 RX ADMIN — AZITHROMYCIN MONOHYDRATE 500 MG: 250 TABLET ORAL at 05:00

## 2020-07-02 RX ADMIN — INSULIN HUMAN 1 UNITS: 100 INJECTION, SOLUTION PARENTERAL at 20:14

## 2020-07-02 RX ADMIN — IPRATROPIUM BROMIDE AND ALBUTEROL SULFATE 3 ML: .5; 3 SOLUTION RESPIRATORY (INHALATION) at 03:17

## 2020-07-02 RX ADMIN — OXYCODONE HYDROCHLORIDE 10 MG: 10 TABLET ORAL at 20:05

## 2020-07-02 RX ADMIN — INSULIN HUMAN 2 UNITS: 100 INJECTION, SOLUTION PARENTERAL at 16:50

## 2020-07-02 RX ADMIN — IPRATROPIUM BROMIDE AND ALBUTEROL SULFATE 3 ML: .5; 3 SOLUTION RESPIRATORY (INHALATION) at 07:53

## 2020-07-02 RX ADMIN — UMECLIDINIUM BROMIDE AND VILANTEROL TRIFENATATE 1 PUFF: 62.5; 25 POWDER RESPIRATORY (INHALATION) at 07:53

## 2020-07-02 RX ADMIN — DULOXETINE HYDROCHLORIDE 60 MG: 60 CAPSULE, DELAYED RELEASE ORAL at 05:00

## 2020-07-02 RX ADMIN — LEVOTHYROXINE SODIUM 75 MCG: 75 TABLET ORAL at 05:00

## 2020-07-02 RX ADMIN — INSULIN HUMAN 1 UNITS: 100 INJECTION, SOLUTION PARENTERAL at 12:58

## 2020-07-02 RX ADMIN — FLUTICASONE PROPIONATE 88 MCG: 44 AEROSOL, METERED RESPIRATORY (INHALATION) at 05:02

## 2020-07-02 RX ADMIN — TRAZODONE HYDROCHLORIDE 300 MG: 150 TABLET ORAL at 16:49

## 2020-07-02 RX ADMIN — OXYCODONE HYDROCHLORIDE 10 MG: 10 TABLET ORAL at 09:00

## 2020-07-02 RX ADMIN — DOCUSATE SODIUM 50 MG AND SENNOSIDES 8.6 MG 2 TABLET: 8.6; 5 TABLET, FILM COATED ORAL at 05:00

## 2020-07-02 ASSESSMENT — ENCOUNTER SYMPTOMS
WEIGHT LOSS: 0
FOCAL WEAKNESS: 0
NAUSEA: 0
CHILLS: 0
WHEEZING: 1
CLAUDICATION: 0
SENSORY CHANGE: 0
HEMOPTYSIS: 0
WEAKNESS: 0
COUGH: 0
BACK PAIN: 0
SINUS PAIN: 0
BLURRED VISION: 0
SHORTNESS OF BREATH: 1
PND: 1
FEVER: 0
CONSTIPATION: 0
VOMITING: 0
BACK PAIN: 1
COUGH: 1
SPEECH CHANGE: 0
HEADACHES: 0
LOSS OF CONSCIOUSNESS: 0
NECK PAIN: 0
DIAPHORESIS: 0
EYE PAIN: 0
DOUBLE VISION: 0
DIARRHEA: 0
ABDOMINAL PAIN: 0
SORE THROAT: 0
DIZZINESS: 0
BLOOD IN STOOL: 0
NERVOUS/ANXIOUS: 1
PALPITATIONS: 0
EYE DISCHARGE: 0
MYALGIAS: 0
DEPRESSION: 0
WHEEZING: 0
BRUISES/BLEEDS EASILY: 0
SPUTUM PRODUCTION: 0

## 2020-07-02 ASSESSMENT — COGNITIVE AND FUNCTIONAL STATUS - GENERAL
TURNING FROM BACK TO SIDE WHILE IN FLAT BAD: A LITTLE
MOVING TO AND FROM BED TO CHAIR: A LOT
TURNING FROM BACK TO SIDE WHILE IN FLAT BAD: A LITTLE
SUGGESTED CMS G CODE MODIFIER DAILY ACTIVITY: CK
SUGGESTED CMS G CODE MODIFIER MOBILITY: CK
DRESSING REGULAR LOWER BODY CLOTHING: A LOT
MOVING TO AND FROM BED TO CHAIR: A LITTLE
MOVING FROM LYING ON BACK TO SITTING ON SIDE OF FLAT BED: A LOT
MOBILITY SCORE: 13
WALKING IN HOSPITAL ROOM: A LOT
DAILY ACTIVITIY SCORE: 22
DRESSING REGULAR LOWER BODY CLOTHING: A LITTLE
MOBILITY SCORE: 16
TOILETING: A LOT
STANDING UP FROM CHAIR USING ARMS: A LOT
PERSONAL GROOMING: A LOT
HELP NEEDED FOR BATHING: A LOT
SUGGESTED CMS G CODE MODIFIER DAILY ACTIVITY: CJ
DRESSING REGULAR UPPER BODY CLOTHING: A LOT
TOILETING: A LITTLE
DAILY ACTIVITIY SCORE: 14
WALKING IN HOSPITAL ROOM: A LOT
CLIMB 3 TO 5 STEPS WITH RAILING: A LOT
STANDING UP FROM CHAIR USING ARMS: A LITTLE
CLIMB 3 TO 5 STEPS WITH RAILING: A LOT
MOVING FROM LYING ON BACK TO SITTING ON SIDE OF FLAT BED: A LITTLE
SUGGESTED CMS G CODE MODIFIER MOBILITY: CL

## 2020-07-02 ASSESSMENT — LIFESTYLE VARIABLES
SUBSTANCE_ABUSE: 0
ALCOHOL_USE: NO
ON A TYPICAL DAY WHEN YOU DRINK ALCOHOL HOW MANY DRINKS DO YOU HAVE: 0
HAVE PEOPLE ANNOYED YOU BY CRITICIZING YOUR DRINKING: NO
HAVE YOU EVER FELT YOU SHOULD CUT DOWN ON YOUR DRINKING: NO
HOW MANY TIMES IN THE PAST YEAR HAVE YOU HAD 5 OR MORE DRINKS IN A DAY: 0
AVERAGE NUMBER OF DAYS PER WEEK YOU HAVE A DRINK CONTAINING ALCOHOL: 0
TOTAL SCORE: 0
EVER HAD A DRINK FIRST THING IN THE MORNING TO STEADY YOUR NERVES TO GET RID OF A HANGOVER: NO
TOTAL SCORE: 0
TOTAL SCORE: 0
CONSUMPTION TOTAL: NEGATIVE
EVER FELT BAD OR GUILTY ABOUT YOUR DRINKING: NO

## 2020-07-02 ASSESSMENT — FIBROSIS 4 INDEX: FIB4 SCORE: 4.78

## 2020-07-02 NOTE — ASSESSMENT & PLAN NOTE
Had HD emergently x 3 with improvement of SOB, 1 L off yesterday, 2 L off today.  Nephrology following  Currently off dialysis as no need at this time, Nephrology following appreciate rec.

## 2020-07-02 NOTE — PROGRESS NOTES
Diagnosis: HAN requiring dialysis. Patient seen and examined on hemodialysis during treatment. Patient is stable, tolerating hemodialysis. Denies chest pain and shortness of breath. Orders updated as needed. Please refer to flowsheet for full details.    Access: R IJ temp [email or web address]   goal: 2L    Plan: Today's dialysis session #3.  Monitor daily for dialysis needs, but plan on dialysis on a Tuesday Thursday Saturday schedule while awaiting return of kidney function.  Patient is oliguric.  Maintain normotension.  Avoid nephrotoxins.  Check labs daily.    Eric Pompa MD  Nephrology

## 2020-07-02 NOTE — ASSESSMENT & PLAN NOTE
2/2 CKD stage 4.  S/p transfusions  Folic acid and B12 within normal limits  Last iron check within normal limits.  Anemia likely secondary to chronic kidney disease, versus small GI losses, will check occult blood

## 2020-07-02 NOTE — PROGRESS NOTES
[facility] Progress Note     Hemodialysis treatment # 3 ordered today per Dr. Pompa x 3 hours.   Treatment initiated at 0929, ended at 1229.       Patient tolerated tx; see paper flow sheet for details.      Net UF 2,000 mL.      Post tx, CVC flushed with saline then locked with heparin 1000 units/mL per designated amount in each wing then clamped and capped.   Aspirate heparin prior to next CVC use.     Report given to Primary RN.

## 2020-07-02 NOTE — PROGRESS NOTES
Came in for possible COPD exacerbationz  Obese  HTN, CKD  DNR, I ok  Got nebs, improved  K went high, resolved with pharmacological intervention but then pressure dropped, required 4 pressors  HD helped  Ca and beta blockers will need to be reintroduced  BRASH? (bradycardia, renal failure, AV suraj blocker meds, shock, and hyperkalemia)    Cleared for transfer from ICU to tele; will assign to Dr. Calderon.

## 2020-07-02 NOTE — PROGRESS NOTES
Hospital Medicine Daily Progress Note    Date of Service  7/2/2020    Chief Complaint  69 y.o. female admitted 6/30/2020 with junctional bradycardia rate 37 with cardiogenic shock, SOB.    Hospital Course    7/2:  This 68 yo female admitted by critical care to ICU for HR 37 junctional rhythm, COPD on 4 LPM NC at home, DM, hypothyroidism, hepatitis C, HTN, CHF, CKD stage 4 followed by Dr. Nava presented with worsening SOB, hypoxia and cough.  She was recently admitted 6/15/20 for COPD exacerbation and pulmonary edema, given amlodopine, metoprolol and lasix.   COVID 19 negative x3.  K was elevated 6.3.  Nephrology consulted and started HD with improvement of HR, BP and SOB.  3 HD sessions performed including today with 2 L removed.  She is feeling better, but still swollen in extremities, on 4 LPM NC baseline O2, ready for dc out of ICU.  Her BP is currently controlled at 97/69, no BP meds given at this point.  K normalized and Cr imporved.  Wbc increased to 23K on IV steroids.  I have started oral taper from solumedrol 60 IV q 12 to 40mg po daily.  It is unclear why patient was started on Rocephin on admission.  I have ordered a urine culture, no UA on admission or culture.  No evidence for pneumonia.  *        Consultants/Specialty  Dr. Pompa  Critical care    Code Status  DNAR, I ok    Disposition  TBD, getting HD currently. Hep C +, no treatment, patient understands the benefits of treatment and will need to pursue outpatient tx.    Review of Systems  Review of Systems   Constitutional: Negative for chills, diaphoresis, fever and malaise/fatigue.   HENT: Negative for congestion and sore throat.    Eyes: Negative for pain and discharge.   Respiratory: Positive for shortness of breath. Negative for cough, hemoptysis, sputum production and wheezing.    Cardiovascular: Positive for leg swelling. Negative for chest pain, palpitations and claudication.   Gastrointestinal: Negative for abdominal pain, constipation,  diarrhea, melena, nausea and vomiting.   Genitourinary: Negative for dysuria, frequency and urgency.   Musculoskeletal: Negative for back pain, joint pain, myalgias and neck pain.   Skin: Negative for itching and rash.   Neurological: Negative for dizziness, sensory change, speech change, focal weakness, loss of consciousness, weakness and headaches.   Endo/Heme/Allergies: Does not bruise/bleed easily.   Psychiatric/Behavioral: Negative for depression, substance abuse and suicidal ideas.        Physical Exam  Temp:  [36.2 °C (97.2 °F)-37.1 °C (98.8 °F)] 36.2 °C (97.2 °F)  Pulse:  [] 84  Resp:  [15-31] 23  BP: (105-141)/(39-58) 119/58  SpO2:  [65 %-100 %] 92 %    Physical Exam  Vitals signs and nursing note reviewed.   Constitutional:       General: She is not in acute distress.     Appearance: She is well-developed. She is not diaphoretic.   HENT:      Head: Normocephalic and atraumatic.      Nose: Nose normal.      Mouth/Throat:      Pharynx: No oropharyngeal exudate.   Eyes:      General: No scleral icterus.        Right eye: No discharge.         Left eye: No discharge.      Conjunctiva/sclera: Conjunctivae normal.      Pupils: Pupils are equal, round, and reactive to light.   Neck:      Musculoskeletal: Normal range of motion and neck supple.      Thyroid: No thyromegaly.      Vascular: No JVD.      Trachea: No tracheal deviation.   Cardiovascular:      Rate and Rhythm: Normal rate and regular rhythm.      Heart sounds: Normal heart sounds. No murmur. No friction rub. No gallop.    Pulmonary:      Effort: Pulmonary effort is normal. No respiratory distress.      Breath sounds: Normal breath sounds. No stridor. No wheezing or rales.      Comments: cta b/l  Chest:      Chest wall: No tenderness.   Abdominal:      General: Bowel sounds are normal. There is no distension.      Palpations: Abdomen is soft. There is no mass.      Tenderness: There is no abdominal tenderness. There is no guarding or rebound.    Musculoskeletal: Normal range of motion.      Right lower leg: Edema present.      Left lower leg: Edema present.   Lymphadenopathy:      Cervical: No cervical adenopathy.   Skin:     General: Skin is warm and dry.      Findings: No erythema or rash.   Neurological:      Mental Status: She is alert and oriented to person, place, and time.      Cranial Nerves: No cranial nerve deficit.      Motor: No abnormal muscle tone.      Coordination: Coordination normal.   Psychiatric:         Behavior: Behavior normal.         Thought Content: Thought content normal.         Judgment: Judgment normal.         Fluids    Intake/Output Summary (Last 24 hours) at 7/2/2020 1438  Last data filed at 7/2/2020 1400  Gross per 24 hour   Intake 540 ml   Output 535 ml   Net 5 ml       Laboratory  Recent Labs     06/30/20  1630 07/01/20  0420 07/02/20  0345   WBC 16.9* 15.0* 23.0*   RBC 2.31* 2.41* 2.66*   HEMOGLOBIN 6.9* 7.2* 8.0*   HEMATOCRIT 22.3* 22.5* 25.5*   MCV 96.5 93.4 95.9   MCH 29.9 29.9 30.1   MCHC 30.9* 32.0* 31.4*   RDW 50.4* 52.8* 54.1*   PLATELETCT 310 176 225   MPV 9.9 9.7 9.4     Recent Labs     07/01/20  0420 07/01/20  1542 07/02/20  0345   SODIUM 129* 132* 123*   POTASSIUM 5.2 4.4 4.5   CHLORIDE 90* 93* 87*   CO2 23 25 25   GLUCOSE 230* 259* 218*   BUN 68* 43* 57*   CREATININE 4.05* 2.81* 3.24*   CALCIUM 8.6 8.4* 8.5     Recent Labs     06/30/20  0849   INR 1.14*               Imaging  EC-ECHOCARDIOGRAM COMPLETE W/O CONT   Final Result      US-RUQ   Final Result      Cholelithiasis with mild gallbladder wall thickening but no positive sonographic Troncoso sign. This could indicate chronic cholecystitis or incomplete distention      DX-CHEST-PORTABLE (1 VIEW)   Final Result      1.  Satisfactory position of new right IJV multilumen dialysis catheter. No pneumothorax identified.      2.  Unchanged mild volume overload pattern.      DX-CHEST-PORTABLE (1 VIEW)   Final Result      No acute cardiac or pulmonary abnormality  is noted. Stable cardiomegaly with increased interstitial opacity diffusely.           Assessment/Plan  Hyperkalemia- (present on admission)  Assessment & Plan  Resolved with HD.    Acute renal failure superimposed on stage 4 chronic kidney disease (HCC)- (present on admission)  Assessment & Plan  Had recent admission, given lasix at discharge and new BP meds, metoprolol and norvasc.  HR 37 and low BP on admission.  Had HD emergently x 3 with improvement of SOB, 1 L off yesterday, 2 L off today.  Dr. Pompa following for HD ongoing determination    Leukocytosis- (present on admission)  Assessment & Plan  2/2 steroids.    Acute exacerbation of chronic obstructive pulmonary disease (COPD) (HCC)- (present on admission)  Assessment & Plan  Patient admitted with COPD exacerbation  7/2 tapering solumedrol IV to po prednisone 40 mg daily x 4 days.    DM type 2, uncontrolled, with renal complications (HCC)- (present on admission)  Assessment & Plan  SSI  Diabetic, renal diet.    Chronic hepatitis C without hepatic coma (HCC)  Assessment & Plan  Will need outpatient treatment.      Junctional bradycardia- (present on admission)  Assessment & Plan  Combination of hyperkalemia, acute on CKD4 and metoprolol, norvasc  Now resolved s/p HD.    Acute on chronic respiratory failure with hypoxia (HCC)- (present on admission)  Assessment & Plan  Initially admitted to ICU for respiratory support.  No intubation is noted in the record.    Anemia- (present on admission)  Assessment & Plan  2/2 CKD stage 4.    Acute on chronic diastolic congestive heart failure (HCC)- (present on admission)  Assessment & Plan  EF 80%    Tobacco dependence- (present on admission)  Assessment & Plan  Recommend cessation    Chronic use of opiate drug for therapeutic purpose- (present on admission)  Assessment & Plan  On oxycodone 10 mg tid at home, continued in hospital    EDITH (obstructive sleep apnea)- (present on admission)  Assessment &  Plan  ongoing    Hypothyroidism- (present on admission)  Assessment & Plan  Continue home meds.       VTE prophylaxis: heparin

## 2020-07-02 NOTE — DISCHARGE PLANNING
This SHITAL CRAWFORD spoke with Sherry,  at [facility] where Pt resides. Per Sherry they can provide transport to Pt with her two  future Doctors' Appointments at [facility]  on September 1st with Dr Dutta and on July 8 with with Dr Parra.     Informed Sherry of Pt's Covid Test result as they require it. Per Sherry she will contact Pt directly on her cellphione two days from now for a phone assessment if they can accept Pt again. This SHITAL CRAWFORD requested SHITAL Camacho to inform Pt of this update.

## 2020-07-02 NOTE — RESPIRATORY CARE
"COPD EDUCATION by COPD CLINICAL EDUCATOR  7/2/2020 at 10:57 AM by Ricarda Negron, ANNETTE     COPD Education provided?  yes    Action Plan Completed/Updated? yes    Pulmonary Function Testing (PFT)? Yes per Dr. Armstrong's note which states \"PFT on 1/12/2015 which showed an FEV1 of 1.35 54% predicted\"    Does patient currently use inhalers/nebulizer at home? Yes, albuterol SVN, albuterol MDI, anoro ellipta. Spacer and aerobika education provided.    Additional medication/equipment recommendations none at this time    Have all necessary follow up appointments been scheduled?  Patient insurance is out of network for Who@. Worked with [facility] for appointments through [facility]. Patient has an appointment on September 1st at 1545 with Dr. Dutta. PCP is pending and was told she will be followed up with [facility] after appointment has been made. Notified patient and case management to establish a ride for the patient.      Palliative Care Team involved in care, been suggested or consulted? Suggested; notified MD    Has the patient been referred to Pulmonary Rehab? yes    Has the patient been referred to the [facility] Program? Patient does not qualify    Home Health referral placed? Patient enrolled in [facility] and states shes been seeing them every other day    Patients subjective reason for admission? patient states she was given too many blood pressure medications and felt ill. States she felt sob and started using her nebulizer.    Has the patient been admitted for COPD in the last 30 days? yes, admitted 6/15-6/22 for COPD exacerbation    Has the patient had an encounter with COPD Education team in the last 30 days? yes    Are there any social barriers? yes, asked patient why she didn't follow up with her PCP after last admission and patient states she had transportation issues.    "

## 2020-07-02 NOTE — CARE PLAN
Problem: Discharge Barriers/Planning  Goal: Patient's continuum of care needs will be met  Outcome: PROGRESSING AS EXPECTED  Pt condition improved overnight, received orders to be transferred to telemetry unit following dialysis.     Problem: Bowel/Gastric:  Goal: Normal bowel function is maintained or improved  Outcome: PROGRESSING SLOWER THAN EXPECTED  Pt experiences chronic constipation, required disimpaction x2 last night.  No bowel movement yet this morning.

## 2020-07-02 NOTE — PROGRESS NOTES
Critical Care Progress Note    Date of admission  6/30/2020    Chief Complaint  69 y.o. female admitted 6/30/2020 with 3 weeks productive cough and worsening CKD    Hospital Course    69 y.o. female with a past medical history of COPD on 4 L of oxygen at home, PFTs on 1/12/2015 which showed an FEV1 of 1.35 54% predicted, diabetes, hypothyroidism, fibromyalgia, hepatitis C, hypertension, CHF, stage IV chronic kidney disease on who presented 6/30/2020 with 3 weeks of worsening respiratory failure with productive cough.  Patient was recently admitted on 6/15/2020 for a COPD exacerbation and pulmonary edema.  She was discharged on Lasix, amlodipine and metoprolol in addition to her prior home regimen.  She had a negative coronavirus test during that stay in addition to another negative coronavirus test following discharge at her assisted living center.  Today the patient was brought in for her dyspnea and found to be hypoxic on her home oxygen settings.  Chest x-ray revealed cardiomegaly without any infiltrate however some increased interstitial opacities.  Coronavirus testing was again negative, EKG showed junctional bradycardia and laboratory analysis showed HAN with a potassium of 6.3.  Patient was medically treated for his hyperkalemia and nephrology was consulted for emergent HD.  Cardiology was also contacted by ERP for EKG changes and recommendations were made to improve metabolic abnormalities.  Patient is to be admitted to the ICU for ongoing care.  The patient denies any significant fever or chills, states that her cough is been increasingly productive and her dyspnea is only transiently improved with her breakthrough nebulizers.  She admits to decreased urine output and denies any chest pain, palpitations.         Interval Problem Update  Reviewed last 24 hour events:    A&O x4  SR/ST 80-100s  SBp 140s  No further ectopy  Off pressors > 24 hrs  UO low - 200cc/12 hrs  Tm 98.8  WBC 23  C3/Azith  Solumedrol 62.5  Q12H  Hgb better 25.5  Na 123, B/c increased  Glucose 200-265  4 lpm NC -> base line HOT  Does not tolerate PAP  Weight loss and smoking cessation encouraged    HD today - pulled 2500 cc  Ok to transfer    Taper dose steroids IV, to PO tomorrow  Pull sykes cath  Pull ashlyn  Keep HD cath for now  Ok to tele    D/w Triage officer     YESTERDAY  AO x4  Follows well  SR 70s  SBp 120s  UO adequate  I/Os +2.5 L  Weaned off 4 pressors  Chronic pain -> Oxy  HD ongoing now  Tm 98.4  5 lpm NC  Duo Q4  C3/Azith  Solumedrol    HD today  IS encouraged  Bowel care protocols  Mobilize as tolerated, refusing a first  PT/OT requested  We will keep patient in ICU 1 more day    Serial follow-up  Echocardiogram ongoing  Hemodynamics acceptable  Tolerated dialysis, approximately 1500 cc pulled  Blood pressure actually trending up and oxygenation slightly improved    Review of Systems  Review of Systems   Constitutional: Positive for malaise/fatigue (better). Negative for chills, fever and weight loss.   HENT: Negative for congestion, sinus pain and sore throat.    Eyes: Negative for blurred vision and double vision.   Respiratory: Positive for cough (better), shortness of breath (markedly better) and wheezing (better). Negative for hemoptysis and sputum production.    Cardiovascular: Positive for leg swelling and PND. Negative for chest pain and palpitations.   Gastrointestinal: Negative for abdominal pain, blood in stool, diarrhea, nausea and vomiting.   Musculoskeletal: Positive for back pain (Chronic) and joint pain (Chronic).   Neurological: Negative for sensory change, speech change, focal weakness and headaches.   Endo/Heme/Allergies: Does not bruise/bleed easily.   Psychiatric/Behavioral: The patient is nervous/anxious (At times - better today).         Vital Signs for last 24 hours   Temp:  [36.2 °C (97.2 °F)-37.1 °C (98.8 °F)] (P) 36.2 °C (97.2 °F)  Pulse:  [] 84  Resp:  [15-31] 23  BP: (105-141)/(39-58) 119/58  SpO2:   [65 %-100 %] 92 %    Hemodynamic parameters for last 24 hours       Respiratory Information for the last 24 hours       Physical Exam   Physical Exam  Vitals signs reviewed.   Constitutional:       Appearance: She is morbidly obese. She is ill-appearing (better - more chronic than acute). She is not toxic-appearing.      Interventions: Nasal cannula in place.   HENT:      Head: Normocephalic and atraumatic.      Mouth/Throat:      Mouth: Mucous membranes are moist.   Eyes:      General: No scleral icterus.     Extraocular Movements: Extraocular movements intact.      Pupils: Pupils are equal, round, and reactive to light.   Neck:      Vascular: No JVD.   Cardiovascular:      Rate and Rhythm: Normal rate and regular rhythm.      Heart sounds: No murmur. No gallop.       Comments: SR  Pulmonary:      Breath sounds: Rhonchi present. No wheezing or rales.   Abdominal:      General: Bowel sounds are normal.      Palpations: Abdomen is soft. There is no mass.      Tenderness: There is no abdominal tenderness. There is no right CVA tenderness, left CVA tenderness or guarding.   Musculoskeletal:      Right lower leg: Edema present.      Left lower leg: Edema present.   Lymphadenopathy:      Cervical: No cervical adenopathy.   Skin:     General: Skin is warm and dry.      Capillary Refill: Capillary refill takes less than 2 seconds.      Coloration: Skin is not cyanotic.      Nails: There is no clubbing.     Neurological:      General: No focal deficit present.      Mental Status: She is alert and oriented to person, place, and time. Mental status is at baseline.   Psychiatric:         Mood and Affect: Mood normal.         Behavior: Behavior normal. Behavior is cooperative.         Thought Content: Thought content normal.         Medications  Current Facility-Administered Medications   Medication Dose Route Frequency Provider Last Rate Last Dose   • heparin injection 2,000 Units  2,000 Units Intravenous DIALYSIS PRN Eric  JAYCOB Pompa   2,000 Units at 07/02/20 0918   • [START ON 7/3/2020] predniSONE (DELTASONE) tablet 40 mg  40 mg Oral DAILY Christina Calderon M.D.       • oxyCODONE immediate release (ROXICODONE) tablet 10 mg  10 mg Oral TID PRN Christina Calderon M.D.       • ipratropium-albuterol (DUONEB) nebulizer solution  3 mL Nebulization 4X/DAY (RT) Heber Armstrong M.D.       • lactulose 20 GM/30ML solution 30 mL  30 mL Oral BID W/MEALS PRN Heber Armstrong M.D.       • NS (BOLUS) infusion 250 mL  250 mL Intravenous DIALYSIS PRN Eric Pompa M.D.       • albumin human 25% solution 12.5 g  12.5 g Intravenous DIALYSIS PRN rEic Pompa M.D. 150 mL/hr at 06/30/20 1513 12.5 g at 06/30/20 1513   • Respiratory Therapy Consult   Nebulization Continuous RT Roger Florence Jr., D.O.       • DULoxetine (CYMBALTA) capsule 60 mg  60 mg Oral DAILY Roger Florence Jr., D.O.   60 mg at 07/02/20 0500   • hydrOXYzine HCl (ATARAX) tablet 25 mg  25 mg Oral TID PRN Roger Florence Jr., D.O.   25 mg at 07/01/20 2053   • levothyroxine (SYNTHROID) tablet 75 mcg  75 mcg Oral AM ES Roger Florence Jr. D.O.   75 mcg at 07/02/20 0500   • lidocaine (LIDODERM) 5 % 1 Patch  1 Patch Transdermal Q24HRS PRN Roger Florence Jr., D.O.       • traZODone (DESYREL) tablet 300 mg  300 mg Oral BID Roger Florence Jr., D.O.   300 mg at 07/02/20 0540   • rosuvastatin (CRESTOR) tablet 10 mg  10 mg Oral Q EVENING Roger Florence Jr., D.O.   10 mg at 07/01/20 1728   • senna-docusate (PERICOLACE or SENOKOT S) 8.6-50 MG per tablet 2 Tab  2 Tab Oral BID Roger Florence Jr., D.O.   2 Tab at 07/02/20 0500    And   • polyethylene glycol/lytes (MIRALAX) PACKET 1 Packet  1 Packet Oral QDAY PRN ROBY Sarmiento Jr..O.   1 Packet at 07/02/20 0500    And   • magnesium hydroxide (MILK OF MAGNESIA) suspension 30 mL  30 mL Oral QDAY PRN ROBY Sarmiento Jr..O.        And   • bisacodyl (DULCOLAX) suppository 10 mg  10 mg Rectal QDAY PRN ROBY Sarmiento Jr..O.       • heparin injection  5,000 Units  5,000 Units Subcutaneous Q8HRS ROBY Sarmiento Jr..OMelonie   5,000 Units at 07/01/20 0540   • ondansetron (ZOFRAN) syringe/vial injection 4 mg  4 mg Intravenous Q4HRS PRN ROBY Sarmiento Jr..O.       • ondansetron (ZOFRAN ODT) dispertab 4 mg  4 mg Oral Q4HRS PRN GHULAM Sarmiento Jr.O.       • Pharmacy Consult Request ...Pain Management Review 1 Each  1 Each Other PHARMACY TO DOSE ROBY Sarmiento Jr..LANCE.       • lactated ringers infusion (BOLUS)  500 mL Intravenous Once PRN Roger Florence Jr., D.O.       • cefTRIAXone (ROCEPHIN) 2 g in  mL IVPB  2 g Intravenous Q24HRS ROBY Sarmiento Jr..ISABELLE   Stopped at 07/02/20 0530   • umeclidinium-vilanterol (ANORO ELLIPTA) inhaler 1 Puff  1 Puff Inhalation QDAILY (RT) ROBY Sarmiento Jr..OMelonie   1 Puff at 07/02/20 0753   • fluticasone (FLOVENT HFA) 44 MCG/ACT inhaler 88 mcg  2 Puff Inhalation DAILY ROBY Sarmiento Jr..O.   88 mcg at 07/02/20 0502   • ipratropium-albuterol (DUONEB) nebulizer solution  3 mL Nebulization Q2HRS PRN (RT) Heber Armstrong M.D.   3 mL at 07/02/20 0317   • heparin injection 2,400 Units  2,400 Units Intracatheter ACUTE DIALYSIS PRN Eric Pompa M.D.   2,400 Units at 07/02/20 1212   • insulin regular (HUMULIN R) injection 1-6 Units  1-6 Units Subcutaneous 4X/DAY ACHS Bob Glover M.D.   1 Units at 07/02/20 1258    And   • glucose 4 g chewable tablet 16 g  16 g Oral Q15 MIN PRN Bob Glover M.D.        And   • dextrose 50% (D50W) injection 50 mL  50 mL Intravenous Q15 MIN PRN Bob Glover M.D.           Fluids    Intake/Output Summary (Last 24 hours) at 7/2/2020 1551  Last data filed at 7/2/2020 1400  Gross per 24 hour   Intake 1040 ml   Output 3035 ml   Net -1995 ml       Laboratory  Recent Labs     06/30/20  1435   ISTATAPH 7.246*   ISTATAPCO2 45.0*   ISTATAPO2 72   ISTATATCO2 21   SDQGWKQ7OLT 91*   ISTATARTHCO3 19.6   ISTATARTBE -7*   ISTATTEMP 36.1 C   ISTATFIO2 37   ISTATSPEC Arterial   ISTATAPHTC 7.259*    FWGGTMOQ0BT 67         Recent Labs     06/30/20  0849  07/01/20 0420 07/01/20 1542 07/02/20  0345   SODIUM 126*   < > 129* 132* 123*   POTASSIUM 6.3*   < > 5.2 4.4 4.5   CHLORIDE 91*   < > 90* 93* 87*   CO2 19*   < > 23 25 25   BUN 97*   < > 68* 43* 57*   CREATININE 5.70*   < > 4.05* 2.81* 3.24*   MAGNESIUM 2.8*  --   --   --   --    PHOSPHORUS 9.1*  --   --   --   --    CALCIUM 9.0   < > 8.6 8.4* 8.5    < > = values in this interval not displayed.     Recent Labs     06/30/20  0849 07/01/20 0420 07/01/20 1542 07/02/20 0345   ALTSGPT 356*  --   --   --   --    ASTSGOT 290*  --   --   --   --    ALKPHOSPHAT 107*  --   --   --   --    TBILIRUBIN 0.2  --   --   --   --    GLUCOSE 114*   < > 230* 259* 218*    < > = values in this interval not displayed.     Recent Labs     06/30/20 0849 06/30/20 1630 07/01/20 0420 07/02/20 0345   WBC 13.7* 16.9* 15.0* 23.0*   NEUTSPOLYS 87.00* 94.50* 95.10* 95.80*   LYMPHOCYTES 5.20* 2.20* 1.40* 1.30*   MONOCYTES 6.20 0.90 2.50 2.10   EOSINOPHILS 0.30 0.10 0.00 0.00   BASOPHILS 0.20 0.20 0.20 0.10   ASTSGOT 290*  --   --   --    ALTSGPT 356*  --   --   --    ALKPHOSPHAT 107*  --   --   --    TBILIRUBIN 0.2  --   --   --      Recent Labs     06/30/20 0849 06/30/20 1630 07/01/20 0420 07/02/20  0345   RBC 2.39* 2.31* 2.41* 2.66*   HEMOGLOBIN 7.3* 6.9* 7.2* 8.0*   HEMATOCRIT 23.6* 22.3* 22.5* 25.5*   PLATELETCT 251 310 176 225   PROTHROMBTM 15.0*  --   --   --    INR 1.14*  --   --   --        Imaging  X-Ray:  I have personally reviewed the images and compared with prior images.  EKG:  I have personally reviewed the images and compared with prior images.  CT:    Reviewed  Echo:   Reviewed    Assessment/Plan  Hyperkalemia- (present on admission)  Assessment & Plan  Resolved HD  Associated with acute on chronic kidney disease  Probable BRASH  Junctional bradycardia on EKG, ? Consequence of hyperkalemia-resolved  Nephrology consult noted  Status post urgent HD-improved    Acute  renal failure superimposed on stage 4 chronic kidney disease (HCC)- (present on admission)  Assessment & Plan  Stat nephrology consult  HD for hyperkalemia  Renal dose meds, avoid nephrotoxins  Strict I/Os  Follow renal function - serial BMP    Goals of care, counseling/discussion- (present on admission)  Assessment & Plan  Discussed CODE STATUS with patient, she has a POLST form stating DNR/DNI however patient states she would want short-term intubation if necessary for a reversible cause.    She would like to update her POLST form prior to discharge    Leukocytosis- (present on admission)  Assessment & Plan  Productive cough, chest x-ray nonspecific-improved  Continue pneumonia coverage  Monitor      Acute exacerbation of chronic obstructive pulmonary disease (COPD) ([facility])- (present on admission)  Assessment & Plan  On 4 L nasal cannula 24/7  Scheduled nebs  RT/O2 Protocols  Titrate supplemental FiO2 to maintain SpO2 >92%  Continue Anoro Ellipta  Spacer as appropriate  Use IV steroid dose today, switch to oral in the zone tomorrow and taper off over 1 week  Antimicrobials for change in sputum 5-7-day course then discontinue  Low threshold to advance to noninvasive positive pressure ventilation or intubation if necessary -well-tolerated unfortunately per patient-flow nasal cannula however was tolerated over auto BiPAP    DM type 2, uncontrolled, with renal complications (HCC)- (present on admission)  Assessment & Plan  Goal blood glucose 120-180  sliding scale insulin, accuchecks  Re-assess long-acting insulin use daily  hypoglycemia protocols  A1c 6.2 two weeks ago      Junctional bradycardia- (present on admission)  Assessment & Plan  Resolved after dialysis and multiple metabolic treatments - no recurrence  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 (on B-blocker and Ca channel blocker) vs HyperK  Continue cardiac monitoring  Echo pending  Trend trop, suspect related to hypotension and demand ischemia  Monitor for need to pace, no  so far  Monitor for need to consult Cardiology    Acute on chronic respiratory failure with hypoxia (HCC)- (present on admission)  Assessment & Plan  Secondary to COPD exacerbation  See above    Anemia- (present on admission)  Assessment & Plan  Acute on chronic  Baseline hemoglobin around 8  No signs of active bleeding  Continue to monitor  Transfuse for hemoglobin less than 7    Acute on chronic diastolic congestive heart failure (HCC)- (present on admission)  Assessment & Plan  Small amount of edema noted on chest x-ray, pro-BNP  intially 7661  Diuresis when more stable -> HD  Restart heart failure regimen when clinically appropriate  Consider cardiology consultation as indicated    Pulmonary HTN (HCC)- (present on admission)  Assessment & Plan  Likely due to COPD and obesity/EDITH  RVSP 60 mmHg  Judicious IV fluid use  Diuresis -> hemodialysis  CNOX an outpatient to facilitate trading home O2?  Patient intolerant of CPAP    Tobacco dependence- (present on admission)  Assessment & Plan  Nicotine replacement protocols, cessation encouraged    Chronic use of opiate drug for therapeutic purpose- (present on admission)  Assessment & Plan  Care with opiates given current state, try alternative agents for pain management then narcotics as able    EDITH (obstructive sleep apnea)- (present on admission)  Assessment & Plan  Monitor for the need to start nocturnal BiPAP, acceptable sats last night on oxygen therapy  She has been told she has EDITH but she cannot tolerate CPAP  Weight Loss encouraged    Hypothyroidism- (present on admission)  Assessment & Plan  Continue Synthroid 75 mcg PO qDay  TSH normal    Morbid obesity (HCC)  Assessment & Plan  Weight loss encouraged, reviewed with patient at length       VTE:  Heparin  Ulcer: Not Indicated  Lines: Central Line  Ongoing indication addressed, Arterial Line  Ongoing indication addressed and Mckeon Catheter  Ongoing indication addressed    I have performed a physical exam and  reviewed and updated ROS and Plan today (7/2/2020). In review of yesterday's note (7/1/2020), there are no changes except as documented above.     Discussed patient condition and risk of morbidity and/or mortality with Hospitalist, RN, RT, Pharmacy, Charge nurse / hot rounds, Patient and nephrology     RCC will S/o - consult pulmonary for additional help as needed.

## 2020-07-02 NOTE — PROGRESS NOTES
1500- report given to telemetry nurse over phone, pt transferred to telemetry, pt has all belongings and medications from drawer.

## 2020-07-02 NOTE — DISCHARGE PLANNING
This RN CM spoke with Adalberto of [facility] where Pt resides. Left a message to Pat of Wellness Department and requested that she return my call. This is about Pt's 2 future Doctors' appointments at [facility]. Awaiting call from Pat.

## 2020-07-03 PROBLEM — I27.20 PULMONARY HYPERTENSION (HCC): Status: ACTIVE | Noted: 2019-06-15

## 2020-07-03 LAB
ALBUMIN SERPL BCP-MCNC: 3.5 G/DL (ref 3.2–4.9)
ALBUMIN/GLOB SERPL: 1.5 G/DL
ALP SERPL-CCNC: 90 U/L (ref 30–99)
ALT SERPL-CCNC: 228 U/L (ref 2–50)
ANION GAP SERPL CALC-SCNC: 14 MMOL/L (ref 7–16)
AST SERPL-CCNC: 71 U/L (ref 12–45)
BASOPHILS # BLD AUTO: 0.1 % (ref 0–1.8)
BASOPHILS # BLD: 0.02 K/UL (ref 0–0.12)
BILIRUB SERPL-MCNC: 0.3 MG/DL (ref 0.1–1.5)
BUN SERPL-MCNC: 42 MG/DL (ref 8–22)
CALCIUM SERPL-MCNC: 8.4 MG/DL (ref 8.5–10.5)
CHLORIDE SERPL-SCNC: 89 MMOL/L (ref 96–112)
CO2 SERPL-SCNC: 25 MMOL/L (ref 20–33)
CREAT SERPL-MCNC: 2.58 MG/DL (ref 0.5–1.4)
EOSINOPHIL # BLD AUTO: 0 K/UL (ref 0–0.51)
EOSINOPHIL NFR BLD: 0 % (ref 0–6.9)
ERYTHROCYTE [DISTWIDTH] IN BLOOD BY AUTOMATED COUNT: 54 FL (ref 35.9–50)
GLOBULIN SER CALC-MCNC: 2.4 G/DL (ref 1.9–3.5)
GLUCOSE BLD-MCNC: 135 MG/DL (ref 65–99)
GLUCOSE BLD-MCNC: 249 MG/DL (ref 65–99)
GLUCOSE BLD-MCNC: 265 MG/DL (ref 65–99)
GLUCOSE SERPL-MCNC: 275 MG/DL (ref 65–99)
HCT VFR BLD AUTO: 25.4 % (ref 37–47)
HGB BLD-MCNC: 7.9 G/DL (ref 12–16)
IMM GRANULOCYTES # BLD AUTO: 0.14 K/UL (ref 0–0.11)
IMM GRANULOCYTES NFR BLD AUTO: 0.9 % (ref 0–0.9)
LYMPHOCYTES # BLD AUTO: 0.21 K/UL (ref 1–4.8)
LYMPHOCYTES NFR BLD: 1.4 % (ref 22–41)
MCH RBC QN AUTO: 30.4 PG (ref 27–33)
MCHC RBC AUTO-ENTMCNC: 31.1 G/DL (ref 33.6–35)
MCV RBC AUTO: 97.7 FL (ref 81.4–97.8)
MONOCYTES # BLD AUTO: 0.4 K/UL (ref 0–0.85)
MONOCYTES NFR BLD AUTO: 2.6 % (ref 0–13.4)
NEUTROPHILS # BLD AUTO: 14.72 K/UL (ref 2–7.15)
NEUTROPHILS NFR BLD: 95 % (ref 44–72)
NRBC # BLD AUTO: 0 K/UL
NRBC BLD-RTO: 0 /100 WBC
PLATELET # BLD AUTO: 197 K/UL (ref 164–446)
PMV BLD AUTO: 9.1 FL (ref 9–12.9)
POTASSIUM SERPL-SCNC: 4.4 MMOL/L (ref 3.6–5.5)
PROT SERPL-MCNC: 5.9 G/DL (ref 6–8.2)
RBC # BLD AUTO: 2.6 M/UL (ref 4.2–5.4)
SODIUM SERPL-SCNC: 128 MMOL/L (ref 135–145)
WBC # BLD AUTO: 15.5 K/UL (ref 4.8–10.8)

## 2020-07-03 PROCEDURE — A9270 NON-COVERED ITEM OR SERVICE: HCPCS | Performed by: INTERNAL MEDICINE

## 2020-07-03 PROCEDURE — 700111 HCHG RX REV CODE 636 W/ 250 OVERRIDE (IP): Performed by: HOSPITALIST

## 2020-07-03 PROCEDURE — 94640 AIRWAY INHALATION TREATMENT: CPT

## 2020-07-03 PROCEDURE — 87077 CULTURE AEROBIC IDENTIFY: CPT

## 2020-07-03 PROCEDURE — 700102 HCHG RX REV CODE 250 W/ 637 OVERRIDE(OP): Performed by: INTERNAL MEDICINE

## 2020-07-03 PROCEDURE — 99233 SBSQ HOSP IP/OBS HIGH 50: CPT | Performed by: HOSPITALIST

## 2020-07-03 PROCEDURE — 99233 SBSQ HOSP IP/OBS HIGH 50: CPT | Performed by: INTERNAL MEDICINE

## 2020-07-03 PROCEDURE — 700101 HCHG RX REV CODE 250: Performed by: INTERNAL MEDICINE

## 2020-07-03 PROCEDURE — 87086 URINE CULTURE/COLONY COUNT: CPT

## 2020-07-03 PROCEDURE — 87186 SC STD MICRODIL/AGAR DIL: CPT

## 2020-07-03 PROCEDURE — 700105 HCHG RX REV CODE 258: Performed by: INTERNAL MEDICINE

## 2020-07-03 PROCEDURE — 82962 GLUCOSE BLOOD TEST: CPT | Mod: 91

## 2020-07-03 PROCEDURE — 700102 HCHG RX REV CODE 250 W/ 637 OVERRIDE(OP): Performed by: HOSPITALIST

## 2020-07-03 PROCEDURE — 770020 HCHG ROOM/CARE - TELE (206)

## 2020-07-03 PROCEDURE — 85025 COMPLETE CBC W/AUTO DIFF WBC: CPT

## 2020-07-03 PROCEDURE — 700111 HCHG RX REV CODE 636 W/ 250 OVERRIDE (IP): Performed by: INTERNAL MEDICINE

## 2020-07-03 PROCEDURE — 80053 COMPREHEN METABOLIC PANEL: CPT

## 2020-07-03 PROCEDURE — 94760 N-INVAS EAR/PLS OXIMETRY 1: CPT

## 2020-07-03 PROCEDURE — A9270 NON-COVERED ITEM OR SERVICE: HCPCS | Performed by: HOSPITALIST

## 2020-07-03 RX ORDER — PREDNISONE 20 MG/1
40 TABLET ORAL DAILY
Status: COMPLETED | OUTPATIENT
Start: 2020-07-04 | End: 2020-07-06

## 2020-07-03 RX ORDER — SODIUM CHLORIDE 9 MG/ML
INJECTION, SOLUTION INTRAVENOUS
Status: ACTIVE
Start: 2020-07-03 | End: 2020-07-03

## 2020-07-03 RX ADMIN — OXYCODONE HYDROCHLORIDE 10 MG: 10 TABLET ORAL at 04:08

## 2020-07-03 RX ADMIN — IPRATROPIUM BROMIDE AND ALBUTEROL SULFATE 3 ML: .5; 3 SOLUTION RESPIRATORY (INHALATION) at 10:26

## 2020-07-03 RX ADMIN — HYDROXYZINE HYDROCHLORIDE 25 MG: 25 TABLET, FILM COATED ORAL at 00:27

## 2020-07-03 RX ADMIN — IPRATROPIUM BROMIDE AND ALBUTEROL SULFATE 3 ML: .5; 3 SOLUTION RESPIRATORY (INHALATION) at 14:42

## 2020-07-03 RX ADMIN — PREDNISONE 40 MG: 20 TABLET ORAL at 04:07

## 2020-07-03 RX ADMIN — IPRATROPIUM BROMIDE AND ALBUTEROL SULFATE 3 ML: .5; 3 SOLUTION RESPIRATORY (INHALATION) at 20:28

## 2020-07-03 RX ADMIN — TRAZODONE HYDROCHLORIDE 300 MG: 150 TABLET ORAL at 23:08

## 2020-07-03 RX ADMIN — OXYCODONE HYDROCHLORIDE 10 MG: 10 TABLET ORAL at 15:10

## 2020-07-03 RX ADMIN — UMECLIDINIUM BROMIDE AND VILANTEROL TRIFENATATE 1 PUFF: 62.5; 25 POWDER RESPIRATORY (INHALATION) at 06:43

## 2020-07-03 RX ADMIN — TRAZODONE HYDROCHLORIDE 300 MG: 150 TABLET ORAL at 04:06

## 2020-07-03 RX ADMIN — INSULIN HUMAN 2 UNITS: 100 INJECTION, SOLUTION PARENTERAL at 16:10

## 2020-07-03 RX ADMIN — OXYCODONE HYDROCHLORIDE 10 MG: 10 TABLET ORAL at 23:08

## 2020-07-03 RX ADMIN — CEFTRIAXONE SODIUM 2 G: 2 INJECTION, POWDER, FOR SOLUTION INTRAMUSCULAR; INTRAVENOUS at 04:09

## 2020-07-03 RX ADMIN — DULOXETINE HYDROCHLORIDE 60 MG: 60 CAPSULE, DELAYED RELEASE ORAL at 04:07

## 2020-07-03 RX ADMIN — ROSUVASTATIN CALCIUM 10 MG: 20 TABLET, FILM COATED ORAL at 17:21

## 2020-07-03 RX ADMIN — INSULIN HUMAN 1 UNITS: 100 INJECTION, SOLUTION PARENTERAL at 20:45

## 2020-07-03 RX ADMIN — INSULIN HUMAN 3 UNITS: 100 INJECTION, SOLUTION PARENTERAL at 11:34

## 2020-07-03 RX ADMIN — FLUTICASONE PROPIONATE 88 MCG: 44 AEROSOL, METERED RESPIRATORY (INHALATION) at 04:09

## 2020-07-03 RX ADMIN — IPRATROPIUM BROMIDE AND ALBUTEROL SULFATE 3 ML: .5; 3 SOLUTION RESPIRATORY (INHALATION) at 06:43

## 2020-07-03 RX ADMIN — LEVOTHYROXINE SODIUM 75 MCG: 75 TABLET ORAL at 04:08

## 2020-07-03 ASSESSMENT — ENCOUNTER SYMPTOMS
NECK PAIN: 0
WHEEZING: 0
CHILLS: 0
SENSORY CHANGE: 0
EYE PAIN: 0
MYALGIAS: 0
NAUSEA: 0
HEMOPTYSIS: 0
SPUTUM PRODUCTION: 0
DIAPHORESIS: 0
DIARRHEA: 0
SHORTNESS OF BREATH: 1
EYE DISCHARGE: 0
FEVER: 0
HEADACHES: 0
SPEECH CHANGE: 0
COUGH: 0
BRUISES/BLEEDS EASILY: 0
BACK PAIN: 0
VOMITING: 0
SORE THROAT: 0
ABDOMINAL PAIN: 0
WEAKNESS: 0
LOSS OF CONSCIOUSNESS: 0
DIZZINESS: 0
CONSTIPATION: 0
FOCAL WEAKNESS: 0
DEPRESSION: 0
CLAUDICATION: 0
PALPITATIONS: 0

## 2020-07-03 ASSESSMENT — FIBROSIS 4 INDEX: FIB4 SCORE: 1.67

## 2020-07-03 ASSESSMENT — LIFESTYLE VARIABLES: SUBSTANCE_ABUSE: 0

## 2020-07-03 NOTE — DISCHARGE PLANNING
Care Transition Team Assessment    RN TY spoke with patient at the bedside to complete transition assessment.  This RN CM familiar with patient from prior admission.  Patient lives @ [facility] and has [facility] that comes to see her.  She has a walker and wheelchair and uses both and is on Home O2, which is provided by [facility].  Of note, she is possibly going to need OP dialysis set up before discharge if her kidneys do not recover.  She gets her medications filled at \[facility]"" in [place].  She requires quite a bit of assistance for her ADLs, especially since she has been sick.      DC Plan:  [facility] w/ [facility]    Information Source  Orientation : Oriented x 4  Information Given By: Patient  Who is responsible for making decisions for patient? : Patient    Readmission Evaluation  Is this a readmission?: Yes - unplanned readmission  Why do you think you were readmitted?: On too many b/p meds  Was an appointment arranged for you prior to discharge?: Yes, attended appointment  Were there new prescriptions you were supposed to fill after you were discharged?: Yes, prescriptions filled  Did you understand your discharge instructions?: Yes  Did you have enough support after your last discharge?: Yes    Elopement Risk  Legal Hold: No  Ambulatory or Self Mobile in Wheelchair: No-Not an Elopement Risk  Disoriented: No  Psychiatric Symptoms: None  History of Wandering: No  Elopement this Admit: No  Vocalizing Wanting to Leave: No  Displays Behaviors, Body Language Wanting to Leave: No-Not at Risk for Elopement  Elopement Risk: Not at Risk for Elopement    Interdisciplinary Discharge Planning  Primary Care Physician: Dr. Crystal Ramos  Lives with - Patient's Self Care Capacity: Attendant / Paid Care Giver  Patient or legal guardian wants to designate a caregiver (see row info): No  Support Systems:  / , Home Care Staff  Housing / Facility: Assisted Living Residence  Name of Care Facility:  [facility]  Do You Take your Prescribed Medications Regularly: Yes  Able to Return to Previous ADL's: Yes  Mobility Issues: No  Prior Services: Skilled Home Health Services, Continuous (24 Hour) Care Giving Per Service, Housekeeping / Homemaker Services  Patient Expects to be Discharged to:: [facility] w/ resumption of [facility]  Assistance Needed: Yes  Durable Medical Equipment: Home Oxygen, Walker, Other - Specify(wheelchair)  DME Provider / Phone: [facility] for [facility]    Discharge Preparedness  What is your plan after discharge?: Home health care  What are your discharge supports?: Other (comment)(friends, facility and HH staff)  Prior Functional Level: Needs Assist with Activities of Daily Living, Needs Assist with Medication Management, Uses Walker, Uses Wheelchair  Difficulity with ADLs: Bathing, Dressing, Walking  Difficulty with ADLs Comment: [facility] staff assists  Difficulity with IADLs: Cooking, Driving, Laundry, Managing medication, Shopping  Difficulity with IADL Comments: [facility] staff assists; uses [facility] transportation van to get places.    Functional Assesment  Prior Functional Level: Needs Assist with Activities of Daily Living, Needs Assist with Medication Management, Uses Walker, Uses Wheelchair    Finances  Financial Barriers to Discharge: No  Prescription Coverage: Yes    Vision / Hearing Impairment  Vision Impairment : No  Right Eye Vision: Impaired, Wears Glasses  Left Eye Vision: Impaired, Wears Glasses  Hearing Impairment : No         Advance Directive  Advance Directive?: DPOA for Health Care  Durable Power of  Name and Contact : Uzair Zavala, brother, [phone]    Domestic Abuse  Have you ever been the victim of abuse or violence?: No  Physical Abuse or Sexual Abuse: No  Verbal Abuse or Emotional Abuse: No  Possible Abuse Reported to:: Not Applicable    Psychological Assessment  History of Substance Abuse: None  History of Psychiatric Problems: Yes  Non-compliant with  Treatment: No    Discharge Risks or Barriers  Discharge risks or barriers?: No  Patient risk factors: Complex medical needs    Anticipated Discharge Information  Anticipated discharge disposition: Assisted living, [facility]  Discharge Address: [address] [place] [address] [id number]    [place] [id number]  Discharge Contact Phone Number: [phone]

## 2020-07-03 NOTE — PROGRESS NOTES
Hospital Medicine Daily Progress Note    Date of Service  7/3/2020    Chief Complaint  69 y.o. female admitted 6/30/2020 with junctional bradycardia rate 37 with cardiogenic shock, SOB.    Hospital Course    7/2:  This 68 yo female admitted by critical care to ICU for HR 37 junctional rhythm, COPD on 4 LPM NC at home, DM, hypothyroidism, hepatitis C, HTN, CHF, CKD stage 4 followed by Dr. Nava presented with worsening SOB, hypoxia and cough.  She was recently admitted 6/15/20 for COPD exacerbation and pulmonary edema, given amlodopine, metoprolol and lasix.   COVID 19 negative x3.  K was elevated 6.3.  Nephrology consulted and started HD with improvement of HR, BP and SOB.  3 HD sessions performed including today with 2 L removed.  She is feeling better, but still swollen in extremities, on 4 LPM NC baseline O2, ready for dc out of ICU.  Her BP is currently controlled at 97/69, no BP meds given at this point.  K normalized and Cr imporved.  Wbc increased to 23K on IV steroids.  I have started oral taper from solumedrol 60 IV q 12 to 40mg po daily.  It is unclear why patient was started on Rocephin on admission.  I have ordered a urine culture, no UA on admission or culture.  No evidence for pneumonia.  7/3:  Patient still with orthopnea, but no wheeze, wet cough noted today.  No HD today, waiting until tomorrow.  Has temporary triple lumen RIJ.  Wbc decreasing, Cr improving 3.24 to 2.58.  EF 80% but RVP elevated 55-60 with moderate mitral regurg noted. SR 76-92  *        Consultants/Specialty  Dr. Pompa  Critical care    Code Status  DNAR, I ok    Disposition  TBD, getting HD currently. Hep C +, no treatment yet, patient understands the benefits of treatment and will need to pursue outpatient tx.  Resides at [facility].  Ordered PT/OT evals.  Baseline O2 5 LPM NC.    Review of Systems  Review of Systems   Constitutional: Negative for chills, diaphoresis, fever and malaise/fatigue.   HENT: Negative for congestion and sore  throat.    Eyes: Negative for pain and discharge.   Respiratory: Positive for shortness of breath. Negative for cough, hemoptysis, sputum production and wheezing.    Cardiovascular: Positive for leg swelling. Negative for chest pain, palpitations and claudication.   Gastrointestinal: Negative for abdominal pain, constipation, diarrhea, melena, nausea and vomiting.   Genitourinary: Negative for dysuria, frequency and urgency.   Musculoskeletal: Negative for back pain, joint pain, myalgias and neck pain.   Skin: Negative for itching and rash.   Neurological: Negative for dizziness, sensory change, speech change, focal weakness, loss of consciousness, weakness and headaches.   Endo/Heme/Allergies: Does not bruise/bleed easily.   Psychiatric/Behavioral: Negative for depression, substance abuse and suicidal ideas.        Physical Exam  Temp:  [36.1 °C (97 °F)-37.1 °C (98.8 °F)] 37.1 °C (98.8 °F)  Pulse:  [71-90] 83  Resp:  [18-22] 18  BP: (124-148)/(44-70) 128/53  SpO2:  [93 %-99 %] 98 %    Physical Exam  Vitals signs and nursing note reviewed.   Constitutional:       General: She is not in acute distress.     Appearance: She is well-developed. She is not diaphoretic.   HENT:      Head: Normocephalic and atraumatic.      Nose: Nose normal.      Mouth/Throat:      Pharynx: No oropharyngeal exudate.   Eyes:      General: No scleral icterus.        Right eye: No discharge.         Left eye: No discharge.      Conjunctiva/sclera: Conjunctivae normal.      Pupils: Pupils are equal, round, and reactive to light.   Neck:      Musculoskeletal: Normal range of motion and neck supple.      Thyroid: No thyromegaly.      Vascular: No JVD.      Trachea: No tracheal deviation.   Cardiovascular:      Rate and Rhythm: Normal rate and regular rhythm.      Heart sounds: Normal heart sounds. No murmur. No friction rub. No gallop.    Pulmonary:      Effort: Pulmonary effort is normal. No respiratory distress.      Breath sounds: Normal  breath sounds. No stridor. No wheezing or rales.      Comments: cta b/l  Chest:      Chest wall: No tenderness.   Abdominal:      General: Bowel sounds are normal. There is no distension.      Palpations: Abdomen is soft. There is no mass.      Tenderness: There is no abdominal tenderness. There is no guarding or rebound.   Musculoskeletal: Normal range of motion.      Right lower leg: Edema present.      Left lower leg: Edema present.   Lymphadenopathy:      Cervical: No cervical adenopathy.   Skin:     General: Skin is warm and dry.      Findings: No erythema or rash.   Neurological:      Mental Status: She is alert and oriented to person, place, and time.      Cranial Nerves: No cranial nerve deficit.      Motor: No abnormal muscle tone.      Coordination: Coordination normal.   Psychiatric:         Behavior: Behavior normal.         Thought Content: Thought content normal.         Judgment: Judgment normal.         Fluids    Intake/Output Summary (Last 24 hours) at 7/3/2020 1320  Last data filed at 7/2/2020 2257  Gross per 24 hour   Intake 660 ml   Output 65 ml   Net 595 ml       Laboratory  Recent Labs     07/01/20  0420 07/02/20  0345 07/03/20  1150   WBC 15.0* 23.0* 15.5*   RBC 2.41* 2.66* 2.60*   HEMOGLOBIN 7.2* 8.0* 7.9*   HEMATOCRIT 22.5* 25.5* 25.4*   MCV 93.4 95.9 97.7   MCH 29.9 30.1 30.4   MCHC 32.0* 31.4* 31.1*   RDW 52.8* 54.1* 54.0*   PLATELETCT 176 225 197   MPV 9.7 9.4 9.1     Recent Labs     07/01/20  1542 07/02/20  0345 07/03/20  1150   SODIUM 132* 123* 128*   POTASSIUM 4.4 4.5 4.4   CHLORIDE 93* 87* 89*   CO2 25 25 25   GLUCOSE 259* 218* 275*   BUN 43* 57* 42*   CREATININE 2.81* 3.24* 2.58*   CALCIUM 8.4* 8.5 8.4*                   Imaging  EC-ECHOCARDIOGRAM COMPLETE W/O CONT   Final Result      US-RUQ   Final Result      Cholelithiasis with mild gallbladder wall thickening but no positive sonographic Troncoso sign. This could indicate chronic cholecystitis or incomplete distention       DX-CHEST-PORTABLE (1 VIEW)   Final Result      1.  Satisfactory position of new right IJV multilumen dialysis catheter. No pneumothorax identified.      2.  Unchanged mild volume overload pattern.      DX-CHEST-PORTABLE (1 VIEW)   Final Result      No acute cardiac or pulmonary abnormality is noted. Stable cardiomegaly with increased interstitial opacity diffusely.           Assessment/Plan  Hyperkalemia- (present on admission)  Assessment & Plan  Resolved with HD.    Acute renal failure superimposed on stage 4 chronic kidney disease (HCC)- (present on admission)  Assessment & Plan  Had recent admission, given lasix at discharge and new BP meds, metoprolol and norvasc.  HR 37 and low BP on admission.  Had HD emergently x 3 with improvement of SOB, 1 L off yesterday, 2 L off today.  Dr. Pompa following for HD ongoing determination    Leukocytosis- (present on admission)  Assessment & Plan  2/2 steroids.    Acute exacerbation of chronic obstructive pulmonary disease (COPD) (HCC)- (present on admission)  Assessment & Plan  Patient admitted with COPD exacerbation  7/2 tapering solumedrol IV to po prednisone 40 mg daily x 4 days.    DM type 2, uncontrolled, with renal complications (HCC)- (present on admission)  Assessment & Plan  SSI  Diabetic, renal diet.    Chronic hepatitis C without hepatic coma (HCC)- (present on admission)  Assessment & Plan  Will need outpatient treatment.  Elevated LFTs.    Junctional bradycardia- (present on admission)  Assessment & Plan  Combination of hyperkalemia, acute on CKD4 and metoprolol, norvasc  Now resolved s/p HD.    Acute on chronic respiratory failure with hypoxia (HCC)- (present on admission)  Assessment & Plan  Initially admitted to ICU for respiratory support.  No intubation is noted in the record.    Anemia- (present on admission)  Assessment & Plan  2/2 CKD stage 4.    Pulmonary hypertension (HCC)- (present on admission)  Assessment & Plan  EF 80%  RVP elevated  55-60%.  Likely will need some diuretic for pulmonary edema.  Currently on HD with 2 L removed on 7/3.      Tobacco dependence- (present on admission)  Assessment & Plan  Recommend cessation    Chronic use of opiate drug for therapeutic purpose- (present on admission)  Assessment & Plan  On oxycodone 10 mg tid at home, continued in hospital    EDTIH (obstructive sleep apnea)- (present on admission)  Assessment & Plan  ongoing    Hypothyroidism- (present on admission)  Assessment & Plan  Continue home meds.       VTE prophylaxis: heparin

## 2020-07-03 NOTE — PROGRESS NOTES
Nephrology Daily Progress Note    Date of Service  7/3/2020    Chief Complaint  69 y.o. female with a history of CKD 3, COPD who presented 6/30/2020 with shortness of breath, found to have bradycardia, hypotension, and HAN.    Interval Problem Update  7/1 -patient had dialysis yesterday with no fluid removed, and resolution of shock on completion of dialysis.  Patient had 500 mL of urine output documented last 24 hours.  Patient had dialysis again this morning with 1 L removed.  Patient says her breathing is better.  Denies chest pain, shortness of breath at this time.  7/3 -patient had third session of dialysis yesterday with 2 L removed.  Tolerated well.  Denies chest pain this morning.  Complains of continued shortness of breath, but improved from admission.    Review of Systems  Review of Systems   Constitutional: Negative for fever.   Respiratory: Positive for shortness of breath.    Cardiovascular: Negative for chest pain.   Gastrointestinal: Negative for abdominal pain.   All other systems reviewed and are negative.       Physical Exam  Temp:  [36.1 °C (97 °F)-37.1 °C (98.8 °F)] 37.1 °C (98.8 °F)  Pulse:  [71-90] 81  Resp:  [18-22] 18  BP: (124-148)/(44-70) 128/53  SpO2:  [93 %-99 %] 95 %    Physical Exam   Constitutional: She is oriented to person, place, and time. She appears well-developed. No distress.   HENT:   Mouth/Throat: Oropharynx is clear and moist. No oropharyngeal exudate.   Eyes: EOM are normal. No scleral icterus.   Neck: No tracheal deviation present.   Cardiovascular: Normal heart sounds.   No murmur heard.  Pulmonary/Chest: Effort normal. No stridor. She has no rales.   Abdominal: Soft. There is no abdominal tenderness.   Musculoskeletal: Normal range of motion.         General: Edema (1+ bilateral LE) present.   Neurological: She is alert and oriented to person, place, and time.   Skin: Skin is warm and dry.   Psychiatric: She has a normal mood and affect.   Access: Right IJ temporary  dialysis catheter.      Fluids    Intake/Output Summary (Last 24 hours) at 7/3/2020 1550  Last data filed at 7/2/2020 2257  Gross per 24 hour   Intake 660 ml   Output --   Net 660 ml       Laboratory  Labs reviewed, pertinent labs below.  Recent Labs     07/01/20  0420 07/02/20  0345 07/03/20  1150   WBC 15.0* 23.0* 15.5*   RBC 2.41* 2.66* 2.60*   HEMOGLOBIN 7.2* 8.0* 7.9*   HEMATOCRIT 22.5* 25.5* 25.4*   MCV 93.4 95.9 97.7   MCH 29.9 30.1 30.4   MCHC 32.0* 31.4* 31.1*   RDW 52.8* 54.1* 54.0*   PLATELETCT 176 225 197   MPV 9.7 9.4 9.1     Recent Labs     07/01/20  1542 07/02/20  0345 07/03/20  1150   SODIUM 132* 123* 128*   POTASSIUM 4.4 4.5 4.4   CHLORIDE 93* 87* 89*   CO2 25 25 25   GLUCOSE 259* 218* 275*   BUN 43* 57* 42*   CREATININE 2.81* 3.24* 2.58*   CALCIUM 8.4* 8.5 8.4*               URINALYSIS:  Lab Results   Component Value Date/Time    COLORURINE DK Yellow 06/15/2019 0245    CLARITY Turbid (A) 06/15/2019 0245    SPECGRAVITY 1.020 06/15/2019 0245    PHURINE 5.0 06/15/2019 0245    KETONES Trace (A) 06/15/2019 0245    PROTEINURIN 300 (A) 06/15/2019 0245    BILIRUBINUR Negative 06/15/2019 0245    UROBILU 1.0 06/15/2019 0245    NITRITE Negative 06/15/2019 0245    LEUKESTERAS Negative 06/15/2019 0245    OCCULTBLOOD Negative 06/15/2019 0245     UPC  No results found for: TOTPROTUR No results found for: CREATININEU      Imaging reviewed  EC-ECHOCARDIOGRAM COMPLETE W/O CONT   Final Result      US-RUQ   Final Result      Cholelithiasis with mild gallbladder wall thickening but no positive sonographic Troncoso sign. This could indicate chronic cholecystitis or incomplete distention      DX-CHEST-PORTABLE (1 VIEW)   Final Result      1.  Satisfactory position of new right IJV multilumen dialysis catheter. No pneumothorax identified.      2.  Unchanged mild volume overload pattern.      DX-CHEST-PORTABLE (1 VIEW)   Final Result      No acute cardiac or pulmonary abnormality is noted. Stable cardiomegaly with increased  interstitial opacity diffusely.            Current Facility-Administered Medications   Medication Dose Route Frequency Provider Last Rate Last Dose   • SODIUM CHLORIDE 0.9 % IV SOLN            • [START ON 7/4/2020] predniSONE (DELTASONE) tablet 40 mg  40 mg Oral DAILY Shiraz Beltran M.D.       • heparin injection 2,000 Units  2,000 Units Intravenous DIALYSIS PRN Eric Pompa M.D.   2,000 Units at 07/02/20 0918   • oxyCODONE immediate release (ROXICODONE) tablet 10 mg  10 mg Oral TID PRN Christina Calderon M.D.   10 mg at 07/03/20 1510   • ipratropium-albuterol (DUONEB) nebulizer solution  3 mL Nebulization 4X/DAY (RT) Heber Armstrong M.D.   3 mL at 07/03/20 1442   • lactulose 20 GM/30ML solution 30 mL  30 mL Oral BID W/MEALS PRN Heber Armstrong M.D.       • NS (BOLUS) infusion 250 mL  250 mL Intravenous DIALYSIS PRN Eric Pompa M.D.       • albumin human 25% solution 12.5 g  12.5 g Intravenous DIALYSIS PRN Eric Pompa M.D. 150 mL/hr at 06/30/20 1513 12.5 g at 06/30/20 1513   • Respiratory Therapy Consult   Nebulization Continuous RT Roger Florence Jr., D.O.       • DULoxetine (CYMBALTA) capsule 60 mg  60 mg Oral DAILY Roger Florence Jr., D.O.   60 mg at 07/03/20 0407   • hydrOXYzine HCl (ATARAX) tablet 25 mg  25 mg Oral TID PRN Roger Florence Jr., D.O.   25 mg at 07/03/20 0027   • levothyroxine (SYNTHROID) tablet 75 mcg  75 mcg Oral AM ES Roger Florence Jr., D.O.   75 mcg at 07/03/20 0408   • lidocaine (LIDODERM) 5 % 1 Patch  1 Patch Transdermal Q24HRS PRN Roger Florence Jr., D.O.       • traZODone (DESYREL) tablet 300 mg  300 mg Oral BID Roger Florence Jr., D.O.   300 mg at 07/03/20 0406   • rosuvastatin (CRESTOR) tablet 10 mg  10 mg Oral Q EVENING Roger Florence Jr., D.O.   10 mg at 07/02/20 1652   • senna-docusate (PERICOLACE or SENOKOT S) 8.6-50 MG per tablet 2 Tab  2 Tab Oral BID Roger Florence Jr., D.O.   2 Tab at 07/02/20 0500    And   • polyethylene glycol/lytes (MIRALAX) PACKET 1 Packet  1 Packet Oral  QDAY PRN ROBY Sarmiento Jr..O.   1 Packet at 07/02/20 0500    And   • magnesium hydroxide (MILK OF MAGNESIA) suspension 30 mL  30 mL Oral QDAY PRN ROBY Sarmiento Jr..OMelonie        And   • bisacodyl (DULCOLAX) suppository 10 mg  10 mg Rectal QDAY PRN ROBY Sarmiento Jr..O.       • heparin injection 5,000 Units  5,000 Units Subcutaneous Q8HRS ROBY Sarmiento Jr..O.   5,000 Units at 07/01/20 0540   • ondansetron (ZOFRAN) syringe/vial injection 4 mg  4 mg Intravenous Q4HRS PRN ROBY Sarmiento Jr..O.       • ondansetron (ZOFRAN ODT) dispertab 4 mg  4 mg Oral Q4HRS PRN ROBY Sarmiento Jr..O.       • Pharmacy Consult Request ...Pain Management Review 1 Each  1 Each Other PHARMACY TO DOSE ROBY Sarmiento Jr..LANCE.       • lactated ringers infusion (BOLUS)  500 mL Intravenous Once PRN ROBY Sarmiento Jr..O.       • cefTRIAXone (ROCEPHIN) 2 g in  mL IVPB  2 g Intravenous Q24HRS ROBY Sarmiento Jr..O.   Stopped at 07/03/20 0439   • umeclidinium-vilanterol (ANORO ELLIPTA) inhaler 1 Puff  1 Puff Inhalation QDAILY (RT) ROBY Sarmiento Jr..O.   1 Puff at 07/03/20 0643   • fluticasone (FLOVENT HFA) 44 MCG/ACT inhaler 88 mcg  2 Puff Inhalation DAILY ROBY Sarmiento Jr..O.   88 mcg at 07/03/20 0409   • ipratropium-albuterol (DUONEB) nebulizer solution  3 mL Nebulization Q2HRS PRN (RT) Heber Armstrong M.D.   3 mL at 07/02/20 0317   • heparin injection 2,400 Units  2,400 Units Intracatheter ACUTE DIALYSIS PRN Eric Pompa M.D.   2,400 Units at 07/02/20 1212   • insulin regular (HUMULIN R) injection 1-6 Units  1-6 Units Subcutaneous 4X/DAY ACHS Bob Glover M.D.   3 Units at 07/03/20 1134    And   • glucose 4 g chewable tablet 16 g  16 g Oral Q15 MIN PRN Bob Glover M.D.        And   • dextrose 50% (D50W) injection 50 mL  50 mL Intravenous Q15 MIN PRN Bob Glover M.D.             Assessment/Plan  69 y.o. female with a history of CKD 3, COPD who presented 6/30/2020 with shortness of  breath, found to have bradycardia, hypotension, and HAN.     1.  HAN on CKD stage III.    Oliguric.  Requiring dialysis since 6/30/2020.  The patient's baseline creatinine runs between 1.8 and 2.2.  On admission creatinine was up to 5.7.    HAN likely due to ATN from cardiogenic shock/bradycardia on admission.  No acute need for dialysis today.  Likely plan on dialysis on Tuesday Thursday Saturday schedule while monitoring kidney labs and awaiting return of kidney function.  Avoid nephrotoxins.  Check labs daily.  -Access: Right IJ temporary Vas-Cath.    2.  Bradycardia, present on admission, now improved.  Unclear etiology.  Defer further management to primary team and cardiology.     3.  Shortness of breath on admission, possible COPD exacerbation, versus fluid overload from acute kidney injury.  Improving with dialysis and ultrafiltration.  Limit IV fluids.  Continue ultrafiltration with dialysis as tolerated.     4.  Hyponatremia, slightly improved today with dialysis yesterday.  Limit hypotonic fluids.  Check labs daily.     5.  Hyperkalemia, resolved.  Check labs daily.     6.  Metabolic acidosis, resolved with dialysis.  Check labs daily.     7.  Normocytic anemia, slightly worsening.  Unclear etiology.  Check iron studies.  Check CBC daily.     8.  Cardiogenic shock, likely due to hemodynamic bradycardia.  Now improved.  Defer management to primary team and cardiology.     Following.      Eric Pompa MD  Nephrology

## 2020-07-03 NOTE — PROGRESS NOTES
Patient transferred at 1503. Patient oriented to unit. VS WNL. No c/o of pain at time of assessment.

## 2020-07-04 LAB
ALBUMIN SERPL BCP-MCNC: 3.5 G/DL (ref 3.2–4.9)
ALBUMIN/GLOB SERPL: 1.4 G/DL
ALP SERPL-CCNC: 91 U/L (ref 30–99)
ALT SERPL-CCNC: 205 U/L (ref 2–50)
ANION GAP SERPL CALC-SCNC: 13 MMOL/L (ref 7–16)
APPEARANCE UR: CLEAR
AST SERPL-CCNC: 50 U/L (ref 12–45)
BACTERIA #/AREA URNS HPF: NEGATIVE /HPF
BASOPHILS # BLD AUTO: 0.2 % (ref 0–1.8)
BASOPHILS # BLD: 0.03 K/UL (ref 0–0.12)
BILIRUB SERPL-MCNC: 0.4 MG/DL (ref 0.1–1.5)
BILIRUB UR QL STRIP.AUTO: NEGATIVE
BUN SERPL-MCNC: 51 MG/DL (ref 8–22)
CALCIUM SERPL-MCNC: 8.5 MG/DL (ref 8.5–10.5)
CHLORIDE SERPL-SCNC: 88 MMOL/L (ref 96–112)
CO2 SERPL-SCNC: 26 MMOL/L (ref 20–33)
COLOR UR: YELLOW
CREAT SERPL-MCNC: 2.8 MG/DL (ref 0.5–1.4)
EOSINOPHIL # BLD AUTO: 0.08 K/UL (ref 0–0.51)
EOSINOPHIL NFR BLD: 0.4 % (ref 0–6.9)
EPI CELLS #/AREA URNS HPF: ABNORMAL /HPF
ERYTHROCYTE [DISTWIDTH] IN BLOOD BY AUTOMATED COUNT: 51.9 FL (ref 35.9–50)
FERRITIN SERPL-MCNC: 2541 NG/ML (ref 10–291)
GLOBULIN SER CALC-MCNC: 2.5 G/DL (ref 1.9–3.5)
GLUCOSE BLD-MCNC: 181 MG/DL (ref 65–99)
GLUCOSE BLD-MCNC: 187 MG/DL (ref 65–99)
GLUCOSE BLD-MCNC: 214 MG/DL (ref 65–99)
GLUCOSE SERPL-MCNC: 198 MG/DL (ref 65–99)
GLUCOSE UR STRIP.AUTO-MCNC: 100 MG/DL
HCT VFR BLD AUTO: 25.9 % (ref 37–47)
HGB BLD-MCNC: 8.1 G/DL (ref 12–16)
HYALINE CASTS #/AREA URNS LPF: ABNORMAL /LPF
IMM GRANULOCYTES # BLD AUTO: 0.33 K/UL (ref 0–0.11)
IMM GRANULOCYTES NFR BLD AUTO: 1.7 % (ref 0–0.9)
IRON SATN MFR SERPL: 30 % (ref 15–55)
IRON SERPL-MCNC: 57 UG/DL (ref 40–170)
KETONES UR STRIP.AUTO-MCNC: NEGATIVE MG/DL
LEUKOCYTE ESTERASE UR QL STRIP.AUTO: NEGATIVE
LYMPHOCYTES # BLD AUTO: 1.24 K/UL (ref 1–4.8)
LYMPHOCYTES NFR BLD: 6.4 % (ref 22–41)
MCH RBC QN AUTO: 29.8 PG (ref 27–33)
MCHC RBC AUTO-ENTMCNC: 31.3 G/DL (ref 33.6–35)
MCV RBC AUTO: 95.2 FL (ref 81.4–97.8)
MICRO URNS: ABNORMAL
MONOCYTES # BLD AUTO: 1.14 K/UL (ref 0–0.85)
MONOCYTES NFR BLD AUTO: 5.9 % (ref 0–13.4)
NEUTROPHILS # BLD AUTO: 16.47 K/UL (ref 2–7.15)
NEUTROPHILS NFR BLD: 85.4 % (ref 44–72)
NITRITE UR QL STRIP.AUTO: NEGATIVE
NRBC # BLD AUTO: 0 K/UL
NRBC BLD-RTO: 0 /100 WBC
PH UR STRIP.AUTO: 5 [PH] (ref 5–8)
PLATELET # BLD AUTO: 250 K/UL (ref 164–446)
PMV BLD AUTO: 9.2 FL (ref 9–12.9)
POTASSIUM SERPL-SCNC: 4.3 MMOL/L (ref 3.6–5.5)
PROT SERPL-MCNC: 6 G/DL (ref 6–8.2)
PROT UR QL STRIP: 100 MG/DL
RBC # BLD AUTO: 2.72 M/UL (ref 4.2–5.4)
RBC # URNS HPF: ABNORMAL /HPF
RBC UR QL AUTO: ABNORMAL
SODIUM SERPL-SCNC: 127 MMOL/L (ref 135–145)
SP GR UR STRIP.AUTO: 1.01
TIBC SERPL-MCNC: 187 UG/DL (ref 250–450)
UIBC SERPL-MCNC: 130 UG/DL (ref 110–370)
UROBILINOGEN UR STRIP.AUTO-MCNC: 0.2 MG/DL
WBC # BLD AUTO: 19.3 K/UL (ref 4.8–10.8)
WBC #/AREA URNS HPF: ABNORMAL /HPF

## 2020-07-04 PROCEDURE — 700105 HCHG RX REV CODE 258: Performed by: INTERNAL MEDICINE

## 2020-07-04 PROCEDURE — 5A1D70Z PERFORMANCE OF URINARY FILTRATION, INTERMITTENT, LESS THAN 6 HOURS PER DAY: ICD-10-PCS | Performed by: INTERNAL MEDICINE

## 2020-07-04 PROCEDURE — 94760 N-INVAS EAR/PLS OXIMETRY 1: CPT

## 2020-07-04 PROCEDURE — 700101 HCHG RX REV CODE 250: Performed by: INTERNAL MEDICINE

## 2020-07-04 PROCEDURE — 700102 HCHG RX REV CODE 250 W/ 637 OVERRIDE(OP): Performed by: INTERNAL MEDICINE

## 2020-07-04 PROCEDURE — 81001 URINALYSIS AUTO W/SCOPE: CPT

## 2020-07-04 PROCEDURE — 94640 AIRWAY INHALATION TREATMENT: CPT

## 2020-07-04 PROCEDURE — 90935 HEMODIALYSIS ONE EVALUATION: CPT

## 2020-07-04 PROCEDURE — 99232 SBSQ HOSP IP/OBS MODERATE 35: CPT | Performed by: HOSPITALIST

## 2020-07-04 PROCEDURE — 83550 IRON BINDING TEST: CPT

## 2020-07-04 PROCEDURE — 82728 ASSAY OF FERRITIN: CPT

## 2020-07-04 PROCEDURE — 85025 COMPLETE CBC W/AUTO DIFF WBC: CPT

## 2020-07-04 PROCEDURE — 700111 HCHG RX REV CODE 636 W/ 250 OVERRIDE (IP): Performed by: INTERNAL MEDICINE

## 2020-07-04 PROCEDURE — 83540 ASSAY OF IRON: CPT

## 2020-07-04 PROCEDURE — A9270 NON-COVERED ITEM OR SERVICE: HCPCS | Performed by: HOSPITALIST

## 2020-07-04 PROCEDURE — A9270 NON-COVERED ITEM OR SERVICE: HCPCS | Performed by: INTERNAL MEDICINE

## 2020-07-04 PROCEDURE — 97165 OT EVAL LOW COMPLEX 30 MIN: CPT

## 2020-07-04 PROCEDURE — 97161 PT EVAL LOW COMPLEX 20 MIN: CPT

## 2020-07-04 PROCEDURE — 770020 HCHG ROOM/CARE - TELE (206)

## 2020-07-04 PROCEDURE — 80053 COMPREHEN METABOLIC PANEL: CPT

## 2020-07-04 PROCEDURE — 97168 OT RE-EVAL EST PLAN CARE: CPT

## 2020-07-04 PROCEDURE — 700102 HCHG RX REV CODE 250 W/ 637 OVERRIDE(OP): Performed by: HOSPITALIST

## 2020-07-04 PROCEDURE — 82962 GLUCOSE BLOOD TEST: CPT

## 2020-07-04 RX ADMIN — FLUTICASONE PROPIONATE 88 MCG: 44 AEROSOL, METERED RESPIRATORY (INHALATION) at 05:38

## 2020-07-04 RX ADMIN — IPRATROPIUM BROMIDE AND ALBUTEROL SULFATE 3 ML: .5; 3 SOLUTION RESPIRATORY (INHALATION) at 15:50

## 2020-07-04 RX ADMIN — TRAZODONE HYDROCHLORIDE 300 MG: 150 TABLET ORAL at 21:13

## 2020-07-04 RX ADMIN — INSULIN HUMAN 2 UNITS: 100 INJECTION, SOLUTION PARENTERAL at 17:34

## 2020-07-04 RX ADMIN — UMECLIDINIUM BROMIDE AND VILANTEROL TRIFENATATE 1 PUFF: 62.5; 25 POWDER RESPIRATORY (INHALATION) at 06:50

## 2020-07-04 RX ADMIN — OXYCODONE HYDROCHLORIDE 10 MG: 10 TABLET ORAL at 22:32

## 2020-07-04 RX ADMIN — DULOXETINE HYDROCHLORIDE 60 MG: 60 CAPSULE, DELAYED RELEASE ORAL at 05:38

## 2020-07-04 RX ADMIN — LIDOCAINE 1 PATCH: 50 PATCH TOPICAL at 22:11

## 2020-07-04 RX ADMIN — PREDNISONE 40 MG: 20 TABLET ORAL at 05:37

## 2020-07-04 RX ADMIN — CEFTRIAXONE SODIUM 2 G: 2 INJECTION, POWDER, FOR SOLUTION INTRAMUSCULAR; INTRAVENOUS at 05:35

## 2020-07-04 RX ADMIN — IPRATROPIUM BROMIDE AND ALBUTEROL SULFATE 3 ML: .5; 3 SOLUTION RESPIRATORY (INHALATION) at 06:50

## 2020-07-04 RX ADMIN — IPRATROPIUM BROMIDE AND ALBUTEROL SULFATE 3 ML: .5; 3 SOLUTION RESPIRATORY (INHALATION) at 19:19

## 2020-07-04 RX ADMIN — OXYCODONE HYDROCHLORIDE 10 MG: 10 TABLET ORAL at 14:40

## 2020-07-04 RX ADMIN — TRAZODONE HYDROCHLORIDE 300 MG: 150 TABLET ORAL at 05:38

## 2020-07-04 RX ADMIN — HEPARIN SODIUM 2400 UNITS: 1000 INJECTION, SOLUTION INTRAVENOUS; SUBCUTANEOUS at 08:43

## 2020-07-04 RX ADMIN — ROSUVASTATIN CALCIUM 10 MG: 20 TABLET, FILM COATED ORAL at 17:37

## 2020-07-04 RX ADMIN — LEVOTHYROXINE SODIUM 75 MCG: 75 TABLET ORAL at 05:37

## 2020-07-04 RX ADMIN — HEPARIN SODIUM 2000 UNITS: 1000 INJECTION, SOLUTION INTRAVENOUS; SUBCUTANEOUS at 08:30

## 2020-07-04 ASSESSMENT — ENCOUNTER SYMPTOMS
NAUSEA: 0
SPEECH CHANGE: 0
FEVER: 0
MYALGIAS: 0
CLAUDICATION: 0
HEADACHES: 0
ABDOMINAL PAIN: 0
BACK PAIN: 0
EYE DISCHARGE: 0
SPUTUM PRODUCTION: 0
FOCAL WEAKNESS: 0
COUGH: 0
SORE THROAT: 0
CONSTIPATION: 0
LOSS OF CONSCIOUSNESS: 0
EYE PAIN: 0
SHORTNESS OF BREATH: 1
WHEEZING: 0
DIAPHORESIS: 0
DIZZINESS: 0
WEAKNESS: 0
VOMITING: 0
DEPRESSION: 0
BRUISES/BLEEDS EASILY: 0
DIARRHEA: 0
NECK PAIN: 0
HEMOPTYSIS: 0
CHILLS: 0
PALPITATIONS: 0
SENSORY CHANGE: 0

## 2020-07-04 ASSESSMENT — COGNITIVE AND FUNCTIONAL STATUS - GENERAL
MOVING FROM LYING ON BACK TO SITTING ON SIDE OF FLAT BED: A LOT
HELP NEEDED FOR BATHING: A LITTLE
MOVING TO AND FROM BED TO CHAIR: A LOT
DRESSING REGULAR UPPER BODY CLOTHING: A LITTLE
TURNING FROM BACK TO SIDE WHILE IN FLAT BAD: A LOT
SUGGESTED CMS G CODE MODIFIER MOBILITY: CK
CLIMB 3 TO 5 STEPS WITH RAILING: TOTAL
DRESSING REGULAR LOWER BODY CLOTHING: A LITTLE
MOBILITY SCORE: 15
PERSONAL GROOMING: A LITTLE
TOILETING: A LITTLE
DAILY ACTIVITIY SCORE: 19
SUGGESTED CMS G CODE MODIFIER DAILY ACTIVITY: CK

## 2020-07-04 ASSESSMENT — LIFESTYLE VARIABLES: SUBSTANCE_ABUSE: 0

## 2020-07-04 ASSESSMENT — GAIT ASSESSMENTS
ASSISTIVE DEVICE: FRONT WHEEL WALKER
DISTANCE (FEET): 30
GAIT LEVEL OF ASSIST: SUPERVISED
DEVIATION: BRADYKINETIC

## 2020-07-04 ASSESSMENT — FIBROSIS 4 INDEX: FIB4 SCORE: 0.98

## 2020-07-04 NOTE — PROGRESS NOTES
[facility] Services Progress Note     Hemodialysis treatment ordered today per Dr. SUSANA Pompa x 3 hours.   Treatment initiated at 0830 ended at 1130.      Patient tolerated tx; see paper flow sheet for details.      Net UF 1500 mL.      Post tx, CVC flushed with saline then locked with heparin 1000 units/mL per designated amount in each wing then clamped and capped. Aspirate heparin prior to next CVC use.     Report given to Primary RN.

## 2020-07-04 NOTE — PROGRESS NOTES
Diagnosis: HAN admitted with SOB, requiring dialysis. Patient seen and examined on hemodialysis during treatment. Patient is stable, tolerating hemodialysis. Denies chest pain and shortness of breath. Orders updated as needed. Please refer to flowsheet for full details.    Access: R IJ temp jaslisa  UF goal: 1.5L    Plan: Continue HD Tuesday Thursday Saturday while awaiting return of renal function. Measure UOP. Check labs daily. If no improvement by Wed-Thurs, plan on tunneled dialysis catheter    Eric Pompa MD  Nephrology

## 2020-07-04 NOTE — THERAPY
Physical Therapy   Initial Evaluation     Patient Name: Priya Callahan  Age:  69 y.o., Sex:  female  Medical Record #: [id number]  Today's Date: 7/4/2020     Precautions: (P) Fall Risk    Assessment  Patient is 69 y.o. female with a diagnosis of worsening SOB, renal failure (stage III CKD).  PMHx includes hypoxia, COPD, bradycardia, on HD, uncontrolled DM.  Patient demonstrates poor activity tolerance with limited strength and balance.  Patient would benefit from home health physical therapy for higher level deficits.  Patient is safe to go back to prior level living situation.  Patient states she feels better post HD despite some fatigue, however was limited in today's session was limited by pain.  Despite pain, patient demonstrates baseline functional mobility.  Patient requires ambulation with nursing staff to increase aerobic tolerance 3x/day.  Patient does not require skilled PT as patient is at supervision for all functional mobility.         07/04/20 1409   Precautions   Precautions Fall Risk   Comments poor aerobic conditioning   Prior Living Situation   Prior Services Continuous (24 Hour) Care Giving Per Service   Housing / Facility Assisted Living Residence   Steps Into Home 0   Steps In Home 0   Equipment Owned Front-Wheel Walker   Lives with - Patient's Self Care Capacity Attendant / Paid Care Giver   Prior Level of Functional Mobility   Bed Mobility Required Assist   Transfer Status Independent   Ambulation Independent   Distance Ambulation (Feet) 30   Assistive Devices Used Front-Wheel Walker   Stairs Dependent   Comments sleep in recliner    Cognition    Cognition / Consciousness WDL   Level of Consciousness Alert   Passive ROM Lower Body   Passive ROM Lower Body X   Comments limiting by body habitus   Active ROM Lower Body    Active ROM Lower Body  X   Comments limiting by body habitus   Strength Lower Body   Lower Body Strength  X   Comments moderate weakness, tested functionally   Balance  Assessment   Sitting Balance (Static) Fair +   Sitting Balance (Dynamic) Fair   Standing Balance (Static) Fair   Standing Balance (Dynamic) Fair -   Weight Shift Sitting Fair   Weight Shift Standing Fair   Comments FWW   Gait Analysis   Gait Level Of Assist Supervised   Assistive Device Front Wheel Walker   Distance (Feet) 30   Deviation Bradykinetic   Bed Mobility    Supine to Sit Supervised  (with increased height of bed)   Sit to Supine Supervised   Scooting Supervised   Functional Mobility   Sit to Stand Supervised   Bed, Chair, Wheelchair Transfer Supervised   How much difficulty does the patient currently have...   Turning over in bed (including adjusting bedclothes, sheets and blankets)? 2   Sitting down on and standing up from a chair with arms (e.g., wheelchair, bedside commode, etc.) 2   Moving from lying on back to sitting on the side of the bed? 2   How much help from another person does the patient currently need...   Moving to and from a bed to a chair (including a wheelchair)? 4   Need to walk in a hospital room? 4   Climbing 3-5 steps with a railing? 1   6 clicks Mobility Score 15   Anticipated Discharge Equipment   DC Equipment None   Interdisciplinary Plan of Care Collaboration   IDT Collaboration with  Nursing   Patient Position at End of Therapy Seated;Call Light within Reach;Tray Table within Reach;Phone within Reach   Collaboration Comments results of PT       Plan    Recommend Physical Therapy for Evaluation only     Discharge recommendations:  [facility] with home health services

## 2020-07-04 NOTE — THERAPY
"Occupational Therapy  Daily Treatment     Patient Name: Priya Callahan  Age:  69 y.o., Sex:  female  Medical Record #: [id number]  Today's Date: 7/4/2020     Precautions  Precautions: Fall Risk    Assessment    Patient at / near baseline necessitating S/A PRN with most ADLS and mobility with FWW household distances. No further OT in this setting at this time. DC OT.     Plan    Continue current treatment plan.    Discharge recommendations:  Return to [facility] with HH     Subjective    \"I am doing much better after all this dialysis\"     Objective       07/04/20 0725   Cognition    Cognition / Consciousness WDL   Bed Mobility    Sit to Supine Supervised   Activities of Daily Living   Eating Independent   Grooming Supervision   Bathing Supervision  (light sponge)   Upper Body Dressing Supervision   Lower Body Dressing Supervision   Toileting Supervision   Comments reports has A at baseline as needed   Functional Mobility   Mobility household with FWW   Patient / Family Goals   Patient / Family Goal #1 eval only         "

## 2020-07-04 NOTE — PROGRESS NOTES
Hospital Medicine Daily Progress Note    Date of Service  7/4/2020    Chief Complaint  69 y.o. female admitted 6/30/2020 with junctional bradycardia rate 37 with cardiogenic shock, SOB.    Hospital Course    7/2:  This 70 yo female admitted by critical care to ICU for HR 37 junctional rhythm, COPD on 4 LPM NC at home, DM, hypothyroidism, hepatitis C, HTN, CHF, CKD stage 4 followed by Dr. Nava presented with worsening SOB, hypoxia and cough.  She was recently admitted 6/15/20 for COPD exacerbation and pulmonary edema, given amlodopine, metoprolol and lasix.   COVID 19 negative x3.  K was elevated 6.3.  Nephrology consulted and started HD with improvement of HR, BP and SOB.  3 HD sessions performed including today with 2 L removed.  She is feeling better, but still swollen in extremities, on 4 LPM NC baseline O2, ready for dc out of ICU.  Her BP is currently controlled at 97/69, no BP meds given at this point.  K normalized and Cr imporved.  Wbc increased to 23K on IV steroids.  I have started oral taper from solumedrol 60 IV q 12 to 40mg po daily.  It is unclear why patient was started on Rocephin on admission.  I have ordered a urine culture, no UA on admission or culture.  No evidence for pneumonia.  7/3:  Patient still with orthopnea, but no wheeze, wet cough noted today.  No HD today, waiting until tomorrow.  Has temporary triple lumen RIJ.  Wbc decreasing, Cr improving 3.24 to 2.58.  EF 80% but RVP elevated 55-60 with moderate mitral regurg noted. SR 76-92.  7/4:  Remains NSR on monitor.  No SOB, had 1.5 L removed during HD today.  Cr improving, urine output improving daily.  States having productive sputum thick brown, ordered sputum culture.  Lungs CTA b/l.*        Consultants/Specialty  Dr. Pompa  Critical care    Code Status  DNAR, I ok    Disposition   getting HD currently. Hep C +, no treatment yet, patient understands the benefits of treatment and will need to pursue outpatient tx.  Resides at [facility].  OT  no needs.  Baseline O2 5 LPM NC.    Review of Systems  Review of Systems   Constitutional: Negative for chills, diaphoresis, fever and malaise/fatigue.   HENT: Negative for congestion and sore throat.    Eyes: Negative for pain and discharge.   Respiratory: Positive for shortness of breath. Negative for cough, hemoptysis, sputum production and wheezing.    Cardiovascular: Positive for leg swelling. Negative for chest pain, palpitations and claudication.   Gastrointestinal: Negative for abdominal pain, constipation, diarrhea, melena, nausea and vomiting.   Genitourinary: Negative for dysuria, frequency and urgency.   Musculoskeletal: Negative for back pain, joint pain, myalgias and neck pain.   Skin: Negative for itching and rash.   Neurological: Negative for dizziness, sensory change, speech change, focal weakness, loss of consciousness, weakness and headaches.   Endo/Heme/Allergies: Does not bruise/bleed easily.   Psychiatric/Behavioral: Negative for depression, substance abuse and suicidal ideas.        Physical Exam  Temp:  [35.9 °C (96.7 °F)-37.1 °C (98.8 °F)] 37.1 °C (98.8 °F)  Pulse:  [65-90] 84  Resp:  [16-18] 16  BP: (123-170)/() 132/47  SpO2:  [91 %-100 %] 94 %    Physical Exam  Vitals signs and nursing note reviewed.   Constitutional:       General: She is not in acute distress.     Appearance: She is well-developed. She is not diaphoretic.   HENT:      Head: Normocephalic and atraumatic.      Nose: Nose normal.      Mouth/Throat:      Pharynx: No oropharyngeal exudate.   Eyes:      General: No scleral icterus.        Right eye: No discharge.         Left eye: No discharge.      Conjunctiva/sclera: Conjunctivae normal.      Pupils: Pupils are equal, round, and reactive to light.   Neck:      Musculoskeletal: Normal range of motion and neck supple.      Thyroid: No thyromegaly.      Vascular: No JVD.      Trachea: No tracheal deviation.   Cardiovascular:      Rate and Rhythm: Normal rate and regular  rhythm.      Heart sounds: Normal heart sounds. No murmur. No friction rub. No gallop.    Pulmonary:      Effort: Pulmonary effort is normal. No respiratory distress.      Breath sounds: Normal breath sounds. No stridor. No wheezing or rales.      Comments: cta b/l  Chest:      Chest wall: No tenderness.   Abdominal:      General: Bowel sounds are normal. There is no distension.      Palpations: Abdomen is soft. There is no mass.      Tenderness: There is no abdominal tenderness. There is no guarding or rebound.   Musculoskeletal: Normal range of motion.      Right lower leg: Edema present.      Left lower leg: Edema present.   Lymphadenopathy:      Cervical: No cervical adenopathy.   Skin:     General: Skin is warm and dry.      Findings: No erythema or rash.   Neurological:      Mental Status: She is alert and oriented to person, place, and time.      Cranial Nerves: No cranial nerve deficit.      Motor: No abnormal muscle tone.      Coordination: Coordination normal.   Psychiatric:         Behavior: Behavior normal.         Thought Content: Thought content normal.         Judgment: Judgment normal.         Fluids    Intake/Output Summary (Last 24 hours) at 7/4/2020 1555  Last data filed at 7/4/2020 1030  Gross per 24 hour   Intake 740 ml   Output 2850 ml   Net -2110 ml       Laboratory  Recent Labs     07/02/20  0345 07/03/20  1150 07/04/20  0215   WBC 23.0* 15.5* 19.3*   RBC 2.66* 2.60* 2.72*   HEMOGLOBIN 8.0* 7.9* 8.1*   HEMATOCRIT 25.5* 25.4* 25.9*   MCV 95.9 97.7 95.2   MCH 30.1 30.4 29.8   MCHC 31.4* 31.1* 31.3*   RDW 54.1* 54.0* 51.9*   PLATELETCT 225 197 250   MPV 9.4 9.1 9.2     Recent Labs     07/02/20  0345 07/03/20  1150 07/04/20  0215   SODIUM 123* 128* 127*   POTASSIUM 4.5 4.4 4.3   CHLORIDE 87* 89* 88*   CO2 25 25 26   GLUCOSE 218* 275* 198*   BUN 57* 42* 51*   CREATININE 3.24* 2.58* 2.80*   CALCIUM 8.5 8.4* 8.5                   Imaging  EC-ECHOCARDIOGRAM COMPLETE W/O CONT   Final Result       US-RUQ   Final Result      Cholelithiasis with mild gallbladder wall thickening but no positive sonographic Troncoso sign. This could indicate chronic cholecystitis or incomplete distention      DX-CHEST-PORTABLE (1 VIEW)   Final Result      1.  Satisfactory position of new right IJV multilumen dialysis catheter. No pneumothorax identified.      2.  Unchanged mild volume overload pattern.      DX-CHEST-PORTABLE (1 VIEW)   Final Result      No acute cardiac or pulmonary abnormality is noted. Stable cardiomegaly with increased interstitial opacity diffusely.           Assessment/Plan  Hyperkalemia- (present on admission)  Assessment & Plan  Resolved with HD.    Acute renal failure superimposed on stage 4 chronic kidney disease (HCC)- (present on admission)  Assessment & Plan  Had recent admission, given lasix at discharge and new BP meds, metoprolol and norvasc.  HR 37 and low BP on admission.  Had HD emergently x 3 with improvement of SOB, 1 L off yesterday, 2 L off today.  Dr. Pompa following for HD ongoing determination, for now plans on TTS schedule.  Will need temporary HD catheter placed prior to dc and HD chair set up.  Patient states she gets better, then goes back to SOB for several months off and on.       Leukocytosis- (present on admission)  Assessment & Plan  2/2 steroids.    Acute exacerbation of chronic obstructive pulmonary disease (COPD) (HCC)- (present on admission)  Assessment & Plan  Patient admitted with COPD exacerbation  7/2 tapering solumedrol IV to po prednisone 40 mg daily x 4 days.  7/4:  Sputum culture ordered for productive cough.    DM type 2, uncontrolled, with renal complications (HCC)- (present on admission)  Assessment & Plan  SSI  Diabetic, renal diet.    Chronic hepatitis C without hepatic coma (HCC)- (present on admission)  Assessment & Plan  Will need outpatient treatment.  Elevated LFTs.    Junctional bradycardia- (present on admission)  Assessment & Plan  Combination of  hyperkalemia, acute on CKD4 and metoprolol, norvasc  Now resolved s/p HD.    Acute on chronic respiratory failure with hypoxia (HCC)- (present on admission)  Assessment & Plan  Initially admitted to ICU for respiratory support.  No intubation is noted in the record.    Anemia- (present on admission)  Assessment & Plan  2/2 CKD stage 4.    Pulmonary hypertension (HCC)- (present on admission)  Assessment & Plan  EF 80%  RVP elevated 55-60%.  Likely will need some diuretic for pulmonary edema.  Currently on HD with 2 L removed on 7/3.      Tobacco dependence- (present on admission)  Assessment & Plan  Recommend cessation    Chronic use of opiate drug for therapeutic purpose- (present on admission)  Assessment & Plan  On oxycodone 10 mg tid at home, continued in hospital    EDITH (obstructive sleep apnea)- (present on admission)  Assessment & Plan  ongoing    Hypothyroidism- (present on admission)  Assessment & Plan  Continue home meds.       VTE prophylaxis: heparin

## 2020-07-05 LAB
ALBUMIN SERPL BCP-MCNC: 2.8 G/DL (ref 3.2–4.9)
ALBUMIN SERPL BCP-MCNC: 3.2 G/DL (ref 3.2–4.9)
ALBUMIN/GLOB SERPL: 1.5 G/DL
ALBUMIN/GLOB SERPL: 1.8 G/DL
ALP SERPL-CCNC: 62 U/L (ref 30–99)
ALP SERPL-CCNC: 69 U/L (ref 30–99)
ALT SERPL-CCNC: 133 U/L (ref 2–50)
ALT SERPL-CCNC: 142 U/L (ref 2–50)
ANION GAP SERPL CALC-SCNC: 11 MMOL/L (ref 7–16)
ANION GAP SERPL CALC-SCNC: 9 MMOL/L (ref 7–16)
AST SERPL-CCNC: 38 U/L (ref 12–45)
AST SERPL-CCNC: 43 U/L (ref 12–45)
BACTERIA BLD CULT: NORMAL
BACTERIA BLD CULT: NORMAL
BASOPHILS # BLD AUTO: 0.1 % (ref 0–1.8)
BASOPHILS # BLD AUTO: 0.2 % (ref 0–1.8)
BASOPHILS # BLD: 0.01 K/UL (ref 0–0.12)
BASOPHILS # BLD: 0.02 K/UL (ref 0–0.12)
BILIRUB SERPL-MCNC: 0.4 MG/DL (ref 0.1–1.5)
BILIRUB SERPL-MCNC: 0.4 MG/DL (ref 0.1–1.5)
BUN SERPL-MCNC: 29 MG/DL (ref 8–22)
BUN SERPL-MCNC: 32 MG/DL (ref 8–22)
CALCIUM SERPL-MCNC: 7.1 MG/DL (ref 8.5–10.5)
CALCIUM SERPL-MCNC: 8.2 MG/DL (ref 8.5–10.5)
CHLORIDE SERPL-SCNC: 91 MMOL/L (ref 96–112)
CHLORIDE SERPL-SCNC: 97 MMOL/L (ref 96–112)
CO2 SERPL-SCNC: 26 MMOL/L (ref 20–33)
CO2 SERPL-SCNC: 28 MMOL/L (ref 20–33)
CREAT SERPL-MCNC: 1.68 MG/DL (ref 0.5–1.4)
CREAT SERPL-MCNC: 1.82 MG/DL (ref 0.5–1.4)
EOSINOPHIL # BLD AUTO: 0.17 K/UL (ref 0–0.51)
EOSINOPHIL # BLD AUTO: 0.2 K/UL (ref 0–0.51)
EOSINOPHIL NFR BLD: 1.8 % (ref 0–6.9)
EOSINOPHIL NFR BLD: 1.8 % (ref 0–6.9)
ERYTHROCYTE [DISTWIDTH] IN BLOOD BY AUTOMATED COUNT: 51.2 FL (ref 35.9–50)
ERYTHROCYTE [DISTWIDTH] IN BLOOD BY AUTOMATED COUNT: 51.9 FL (ref 35.9–50)
GLOBULIN SER CALC-MCNC: 1.6 G/DL (ref 1.9–3.5)
GLOBULIN SER CALC-MCNC: 2.2 G/DL (ref 1.9–3.5)
GLUCOSE BLD-MCNC: 150 MG/DL (ref 65–99)
GLUCOSE BLD-MCNC: 168 MG/DL (ref 65–99)
GLUCOSE BLD-MCNC: 245 MG/DL (ref 65–99)
GLUCOSE BLD-MCNC: 319 MG/DL (ref 65–99)
GLUCOSE SERPL-MCNC: 139 MG/DL (ref 65–99)
GLUCOSE SERPL-MCNC: 142 MG/DL (ref 65–99)
HCT VFR BLD AUTO: 20.4 % (ref 37–47)
HCT VFR BLD AUTO: 22.5 % (ref 37–47)
HCV RNA SERPL NAA+PROBE-ACNC: NOT DETECTED IU/ML
HCV RNA SERPL NAA+PROBE-LOG IU: NOT DETECTED LOG IU/ML
HCV RNA SERPL QL NAA+PROBE: NOT DETECTED
HGB BLD-MCNC: 6.4 G/DL (ref 12–16)
HGB BLD-MCNC: 7.1 G/DL (ref 12–16)
IMM GRANULOCYTES # BLD AUTO: 0.15 K/UL (ref 0–0.11)
IMM GRANULOCYTES # BLD AUTO: 0.18 K/UL (ref 0–0.11)
IMM GRANULOCYTES NFR BLD AUTO: 1.6 % (ref 0–0.9)
IMM GRANULOCYTES NFR BLD AUTO: 1.6 % (ref 0–0.9)
LYMPHOCYTES # BLD AUTO: 1.08 K/UL (ref 1–4.8)
LYMPHOCYTES # BLD AUTO: 1.14 K/UL (ref 1–4.8)
LYMPHOCYTES NFR BLD: 10.3 % (ref 22–41)
LYMPHOCYTES NFR BLD: 11.2 % (ref 22–41)
MCH RBC QN AUTO: 29.8 PG (ref 27–33)
MCH RBC QN AUTO: 30.2 PG (ref 27–33)
MCHC RBC AUTO-ENTMCNC: 31.4 G/DL (ref 33.6–35)
MCHC RBC AUTO-ENTMCNC: 31.6 G/DL (ref 33.6–35)
MCV RBC AUTO: 94.5 FL (ref 81.4–97.8)
MCV RBC AUTO: 96.2 FL (ref 81.4–97.8)
MONOCYTES # BLD AUTO: 0.55 K/UL (ref 0–0.85)
MONOCYTES # BLD AUTO: 0.61 K/UL (ref 0–0.85)
MONOCYTES NFR BLD AUTO: 5.5 % (ref 0–13.4)
MONOCYTES NFR BLD AUTO: 5.7 % (ref 0–13.4)
NEUTROPHILS # BLD AUTO: 7.7 K/UL (ref 2–7.15)
NEUTROPHILS # BLD AUTO: 8.9 K/UL (ref 2–7.15)
NEUTROPHILS NFR BLD: 79.6 % (ref 44–72)
NEUTROPHILS NFR BLD: 80.6 % (ref 44–72)
NRBC # BLD AUTO: 0 K/UL
NRBC # BLD AUTO: 0 K/UL
NRBC BLD-RTO: 0 /100 WBC
NRBC BLD-RTO: 0 /100 WBC
PLATELET # BLD AUTO: 144 K/UL (ref 164–446)
PLATELET # BLD AUTO: 175 K/UL (ref 164–446)
PMV BLD AUTO: 8.7 FL (ref 9–12.9)
PMV BLD AUTO: 9 FL (ref 9–12.9)
POTASSIUM SERPL-SCNC: 3.4 MMOL/L (ref 3.6–5.5)
POTASSIUM SERPL-SCNC: 3.7 MMOL/L (ref 3.6–5.5)
PROT SERPL-MCNC: 4.4 G/DL (ref 6–8.2)
PROT SERPL-MCNC: 5.4 G/DL (ref 6–8.2)
RBC # BLD AUTO: 2.12 M/UL (ref 4.2–5.4)
RBC # BLD AUTO: 2.38 M/UL (ref 4.2–5.4)
SIGNIFICANT IND 70042: NORMAL
SIGNIFICANT IND 70042: NORMAL
SITE SITE: NORMAL
SITE SITE: NORMAL
SODIUM SERPL-SCNC: 128 MMOL/L (ref 135–145)
SODIUM SERPL-SCNC: 134 MMOL/L (ref 135–145)
SOURCE SOURCE: NORMAL
SOURCE SOURCE: NORMAL
WBC # BLD AUTO: 11.1 K/UL (ref 4.8–10.8)
WBC # BLD AUTO: 9.7 K/UL (ref 4.8–10.8)

## 2020-07-05 PROCEDURE — 99233 SBSQ HOSP IP/OBS HIGH 50: CPT | Performed by: INTERNAL MEDICINE

## 2020-07-05 PROCEDURE — 94640 AIRWAY INHALATION TREATMENT: CPT

## 2020-07-05 PROCEDURE — 700111 HCHG RX REV CODE 636 W/ 250 OVERRIDE (IP): Performed by: INTERNAL MEDICINE

## 2020-07-05 PROCEDURE — A9270 NON-COVERED ITEM OR SERVICE: HCPCS | Performed by: INTERNAL MEDICINE

## 2020-07-05 PROCEDURE — 770020 HCHG ROOM/CARE - TELE (206)

## 2020-07-05 PROCEDURE — 82962 GLUCOSE BLOOD TEST: CPT | Mod: 91

## 2020-07-05 PROCEDURE — 700101 HCHG RX REV CODE 250: Performed by: INTERNAL MEDICINE

## 2020-07-05 PROCEDURE — A9270 NON-COVERED ITEM OR SERVICE: HCPCS | Performed by: HOSPITALIST

## 2020-07-05 PROCEDURE — 700102 HCHG RX REV CODE 250 W/ 637 OVERRIDE(OP): Performed by: HOSPITALIST

## 2020-07-05 PROCEDURE — 94760 N-INVAS EAR/PLS OXIMETRY 1: CPT

## 2020-07-05 PROCEDURE — 99232 SBSQ HOSP IP/OBS MODERATE 35: CPT | Performed by: HOSPITALIST

## 2020-07-05 PROCEDURE — 85025 COMPLETE CBC W/AUTO DIFF WBC: CPT

## 2020-07-05 PROCEDURE — 700102 HCHG RX REV CODE 250 W/ 637 OVERRIDE(OP): Performed by: INTERNAL MEDICINE

## 2020-07-05 PROCEDURE — 80053 COMPREHEN METABOLIC PANEL: CPT

## 2020-07-05 RX ADMIN — ROSUVASTATIN CALCIUM 10 MG: 20 TABLET, FILM COATED ORAL at 16:51

## 2020-07-05 RX ADMIN — INSULIN HUMAN 1 UNITS: 100 INJECTION, SOLUTION PARENTERAL at 08:48

## 2020-07-05 RX ADMIN — TRAZODONE HYDROCHLORIDE 300 MG: 150 TABLET ORAL at 21:04

## 2020-07-05 RX ADMIN — OXYCODONE HYDROCHLORIDE 10 MG: 10 TABLET ORAL at 19:59

## 2020-07-05 RX ADMIN — IPRATROPIUM BROMIDE AND ALBUTEROL SULFATE 3 ML: .5; 3 SOLUTION RESPIRATORY (INHALATION) at 10:43

## 2020-07-05 RX ADMIN — UMECLIDINIUM BROMIDE AND VILANTEROL TRIFENATATE 1 PUFF: 62.5; 25 POWDER RESPIRATORY (INHALATION) at 07:26

## 2020-07-05 RX ADMIN — IPRATROPIUM BROMIDE AND ALBUTEROL SULFATE 3 ML: .5; 3 SOLUTION RESPIRATORY (INHALATION) at 19:14

## 2020-07-05 RX ADMIN — PREDNISONE 40 MG: 20 TABLET ORAL at 05:00

## 2020-07-05 RX ADMIN — IPRATROPIUM BROMIDE AND ALBUTEROL SULFATE 3 ML: .5; 3 SOLUTION RESPIRATORY (INHALATION) at 07:26

## 2020-07-05 RX ADMIN — INSULIN HUMAN 4 UNITS: 100 INJECTION, SOLUTION PARENTERAL at 12:05

## 2020-07-05 RX ADMIN — FLUTICASONE PROPIONATE 88 MCG: 44 AEROSOL, METERED RESPIRATORY (INHALATION) at 05:01

## 2020-07-05 RX ADMIN — OXYCODONE HYDROCHLORIDE 10 MG: 10 TABLET ORAL at 11:14

## 2020-07-05 RX ADMIN — LEVOTHYROXINE SODIUM 75 MCG: 75 TABLET ORAL at 05:00

## 2020-07-05 RX ADMIN — IPRATROPIUM BROMIDE AND ALBUTEROL SULFATE 3 ML: .5; 3 SOLUTION RESPIRATORY (INHALATION) at 14:39

## 2020-07-05 RX ADMIN — DULOXETINE HYDROCHLORIDE 60 MG: 60 CAPSULE, DELAYED RELEASE ORAL at 05:00

## 2020-07-05 RX ADMIN — INSULIN HUMAN 2 UNITS: 100 INJECTION, SOLUTION PARENTERAL at 16:54

## 2020-07-05 ASSESSMENT — ENCOUNTER SYMPTOMS
CHILLS: 0
FOCAL WEAKNESS: 0
WHEEZING: 0
EYE PAIN: 0
DIZZINESS: 0
VOMITING: 0
COUGH: 1
SENSORY CHANGE: 0
BRUISES/BLEEDS EASILY: 0
LOSS OF CONSCIOUSNESS: 0
PALPITATIONS: 0
MYALGIAS: 0
DEPRESSION: 0
ABDOMINAL PAIN: 0
SHORTNESS OF BREATH: 1
COUGH: 0
CONSTIPATION: 0
DIAPHORESIS: 0
NECK PAIN: 0
SORE THROAT: 0
HEMOPTYSIS: 0
BACK PAIN: 0
SPEECH CHANGE: 0
NAUSEA: 0
HEADACHES: 0
FEVER: 0
EYE DISCHARGE: 0
WEAKNESS: 0
DIARRHEA: 0
CLAUDICATION: 0
SPUTUM PRODUCTION: 0

## 2020-07-05 ASSESSMENT — LIFESTYLE VARIABLES: SUBSTANCE_ABUSE: 0

## 2020-07-05 ASSESSMENT — FIBROSIS 4 INDEX
FIB4 SCORE: 1.44
FIB4 SCORE: 1.44

## 2020-07-05 NOTE — PROGRESS NOTES
Nephrology Daily Progress Note    Date of Service  7/5/2020    Chief Complaint  69 y.o. female with a history of CKD 3, COPD who presented 6/30/2020 with shortness of breath, found to have bradycardia, hypotension, and HAN.    Interval Problem Update  7/1 -patient had dialysis yesterday with no fluid removed, and resolution of shock on completion of dialysis.  Patient had 500 mL of urine output documented last 24 hours.  Patient had dialysis again this morning with 1 L removed.  Patient says her breathing is better.  Denies chest pain, shortness of breath at this time.  7/3 -patient had third session of dialysis yesterday with 2 L removed.  Tolerated well.  Denies chest pain this morning.  Complains of continued shortness of breath, but improved from admission.  7/5 -patient had dialysis yesterday with 1.5 L removed.  Patient says she is urinating, but no urine output being recorded.  Patient says her shortness of breath is still there, but better compared to admission.  Patient denies chest pain.      Review of Systems  Review of Systems   Constitutional: Negative for fever.   Respiratory: Positive for cough and shortness of breath.    Cardiovascular: Negative for chest pain.   Gastrointestinal: Negative for abdominal pain.   All other systems reviewed and are negative.       Physical Exam  Temp:  [35.9 °C (96.6 °F)-36.9 °C (98.4 °F)] 35.9 °C (96.6 °F)  Pulse:  [66-84] 78  Resp:  [16-20] 18  BP: (100-161)/(55-74) 136/59  SpO2:  [90 %-96 %] 94 %    Physical Exam   Constitutional: She is oriented to person, place, and time. She appears well-developed. No distress.   HENT:   Mouth/Throat: Oropharynx is clear and moist. No oropharyngeal exudate.   Eyes: EOM are normal. No scleral icterus.   Neck: No tracheal deviation present.   Cardiovascular: Normal heart sounds.   No murmur heard.  Pulmonary/Chest: Effort normal. No stridor. She has no rales.   Abdominal: Soft. There is no abdominal tenderness.   Musculoskeletal:  Normal range of motion.         General: Edema (Trace bilateral LE) present.   Neurological: She is alert and oriented to person, place, and time.   Skin: Skin is warm and dry.   Psychiatric: She has a normal mood and affect.   Access: Right IJ temporary dialysis catheter.      Fluids    Intake/Output Summary (Last 24 hours) at 7/5/2020 1444  Last data filed at 7/5/2020 1400  Gross per 24 hour   Intake --   Output 400 ml   Net -400 ml       Laboratory  Labs reviewed, pertinent labs below.  Recent Labs     07/04/20 0215 07/05/20 0355 07/05/20  0455   WBC 19.3* 9.7 11.1*   RBC 2.72* 2.12* 2.38*   HEMOGLOBIN 8.1* 6.4* 7.1*   HEMATOCRIT 25.9* 20.4* 22.5*   MCV 95.2 96.2 94.5   MCH 29.8 30.2 29.8   MCHC 31.3* 31.4* 31.6*   RDW 51.9* 51.9* 51.2*   PLATELETCT 250 144* 175   MPV 9.2 8.7* 9.0     Recent Labs     07/04/20 0215 07/05/20 0355 07/05/20  0455   SODIUM 127* 134* 128*   POTASSIUM 4.3 3.4* 3.7   CHLORIDE 88* 97 91*   CO2 26 26 28   GLUCOSE 198* 142* 139*   BUN 51* 29* 32*   CREATININE 2.80* 1.68* 1.82*   CALCIUM 8.5 7.1* 8.2*               URINALYSIS:  Lab Results   Component Value Date/Time    COLORURINE DK Yellow 06/15/2019 0245    CLARITY Turbid (A) 06/15/2019 0245    SPECGRAVITY 1.020 06/15/2019 0245    PHURINE 5.0 06/15/2019 0245    KETONES Trace (A) 06/15/2019 0245    PROTEINURIN 300 (A) 06/15/2019 0245    BILIRUBINUR Negative 06/15/2019 0245    UROBILU 1.0 06/15/2019 0245    NITRITE Negative 06/15/2019 0245    LEUKESTERAS Negative 06/15/2019 0245    OCCULTBLOOD Negative 06/15/2019 0245     UPC  No results found for: TOTPROTUR No results found for: CREATININEU      Imaging reviewed  EC-ECHOCARDIOGRAM COMPLETE W/O CONT   Final Result      US-RUQ   Final Result      Cholelithiasis with mild gallbladder wall thickening but no positive sonographic Troncoso sign. This could indicate chronic cholecystitis or incomplete distention      DX-CHEST-PORTABLE (1 VIEW)   Final Result      1.  Satisfactory position of new  right IJV multilumen dialysis catheter. No pneumothorax identified.      2.  Unchanged mild volume overload pattern.      DX-CHEST-PORTABLE (1 VIEW)   Final Result      No acute cardiac or pulmonary abnormality is noted. Stable cardiomegaly with increased interstitial opacity diffusely.            Current Facility-Administered Medications   Medication Dose Route Frequency Provider Last Rate Last Dose   • predniSONE (DELTASONE) tablet 40 mg  40 mg Oral DAILY Shiraz Beltran M.D.   40 mg at 07/05/20 0500   • heparin injection 2,000 Units  2,000 Units Intravenous DIALYSIS PRN Eric Pompa M.D.   2,000 Units at 07/04/20 0830   • oxyCODONE immediate release (ROXICODONE) tablet 10 mg  10 mg Oral TID PRN Christina Calderon M.D.   10 mg at 07/05/20 1114   • ipratropium-albuterol (DUONEB) nebulizer solution  3 mL Nebulization 4X/DAY (RT) Heber Armstrong M.D.   3 mL at 07/05/20 1439   • lactulose 20 GM/30ML solution 30 mL  30 mL Oral BID W/MEALS PRN Heber Armstrong M.D.       • NS (BOLUS) infusion 250 mL  250 mL Intravenous DIALYSIS PRN Eric Pompa M.D.       • albumin human 25% solution 12.5 g  12.5 g Intravenous DIALYSIS PRN Eric Pompa M.D. 150 mL/hr at 06/30/20 1513 12.5 g at 06/30/20 1513   • Respiratory Therapy Consult   Nebulization Continuous RT Roger Florence Jr., D.O.       • DULoxetine (CYMBALTA) capsule 60 mg  60 mg Oral DAILY Roger Florence Jr., D.O.   60 mg at 07/05/20 0500   • hydrOXYzine HCl (ATARAX) tablet 25 mg  25 mg Oral TID PRN Roger Florence Jr., D.O.   25 mg at 07/03/20 0027   • levothyroxine (SYNTHROID) tablet 75 mcg  75 mcg Oral AM ES Roger Florence Jr., D.O.   75 mcg at 07/05/20 0500   • lidocaine (LIDODERM) 5 % 1 Patch  1 Patch Transdermal Q24HRS PRN Roger Florence Jr., D.O.   1 Patch at 07/04/20 2211   • traZODone (DESYREL) tablet 300 mg  300 mg Oral BID Roger Florence Jr., D.O.   Stopped at 07/05/20 0600   • rosuvastatin (CRESTOR) tablet 10 mg  10 mg Oral Q EVENING Roger Florence Jr., D.O.   10  mg at 07/04/20 1737   • senna-docusate (PERICOLACE or SENOKOT S) 8.6-50 MG per tablet 2 Tab  2 Tab Oral BID ROBY Sarmiento Jr..OMelonie   Stopped at 07/04/20 0600    And   • polyethylene glycol/lytes (MIRALAX) PACKET 1 Packet  1 Packet Oral QDAY PRN ROBY Sarmiento Jr..O.   1 Packet at 07/02/20 0500    And   • magnesium hydroxide (MILK OF MAGNESIA) suspension 30 mL  30 mL Oral QDAY PRN ROBY Sarmiento Jr..OMelonie        And   • bisacodyl (DULCOLAX) suppository 10 mg  10 mg Rectal QDAY PRN ROBY Sarmiento Jr..O.       • heparin injection 5,000 Units  5,000 Units Subcutaneous Q8HRS ROBY Sarmiento Jr..OMelonie   5,000 Units at 07/01/20 0540   • ondansetron (ZOFRAN) syringe/vial injection 4 mg  4 mg Intravenous Q4HRS PRN ROBY Sarmiento Jr..O.       • ondansetron (ZOFRAN ODT) dispertab 4 mg  4 mg Oral Q4HRS PRN ROBY Sarmiento Jr..O.       • Pharmacy Consult Request ...Pain Management Review 1 Each  1 Each Other PHARMACY TO DOSE ROBY Sarmiento Jr..LANCE.       • lactated ringers infusion (BOLUS)  500 mL Intravenous Once PRN ROBY Sarmiento Jr..O.       • umeclidinium-vilanterol (ANORO ELLIPTA) inhaler 1 Puff  1 Puff Inhalation QDAILY (RT) ROBY Sarmiento Jr..O.   1 Puff at 07/05/20 0726   • fluticasone (FLOVENT HFA) 44 MCG/ACT inhaler 88 mcg  2 Puff Inhalation DAILY ROBY Sarmiento Jr..O.   88 mcg at 07/05/20 0501   • ipratropium-albuterol (DUONEB) nebulizer solution  3 mL Nebulization Q2HRS PRN (RT) Heber Armstrong M.D.   3 mL at 07/02/20 0317   • heparin injection 2,400 Units  2,400 Units Intracatheter ACUTE DIALYSIS PRN Eric Pompa M.D.   2,400 Units at 07/04/20 0843   • insulin regular (HUMULIN R) injection 1-6 Units  1-6 Units Subcutaneous 4X/DAY RUSS Glover M.D.   4 Units at 07/05/20 1205    And   • glucose 4 g chewable tablet 16 g  16 g Oral Q15 MIN PRN Bob Glover M.D.        And   • dextrose 50% (D50W) injection 50 mL  50 mL Intravenous Q15 MIN PRN Bob Glover M.D.              Assessment/Plan  69 y.o. female with a history of CKD 3, COPD who presented 6/30/2020 with shortness of breath, found to have bradycardia, hypotension, and HAN.     1.  HAN on CKD stage III.    Oliguric, producing urine per patient.  Please record urine output.  Requiring dialysis since 6/30/2020.  The patient's baseline creatinine runs between 1.8 and 2.2.  On admission creatinine was up to 5.7.  HAN likely due to ATN from cardiogenic shock/bradycardia on admission.  No acute need for dialysis today.  Plan on dialysis on a Tuesday Thursday Saturday schedule while monitoring kidney labs and urine output for return of kidney function.  Avoid nephrotoxins.  Check labs daily.  -Access: Right IJ temp Vas-Cath.  If no return of kidney function by Wednesday, would recommend tunneled dialysis catheter in outpatient dialysis center placement.    2.  Bradycardia, present on admission, now improved.  Defer further management to primary team and cardiology.     3.  Shortness of breath on admission, possible COPD exacerbation versus fluid overload from HAN.  Improving with dialysis and ultrafiltration.  Limit IV fluids.  Continue ultrafiltration with dialysis as tolerated.  Management of COPD per primary team.    4.  Hyponatremia, worsening.  Likely due to excess fluid intake.  Limit hypotonic fluids.  Check labs daily.     5.  Normocytic anemia, worsening.  Unclear etiology.  Check iron studies.  Check CBC daily.     6.  Cardiogenic shock, from hemodynamically significant bradycardia.  Now improved.  Defer further management to primary team and cardiology.     Following.  Dr. Bansal will assume consultative care tomorrow    Eric Pompa MD  Nephrology

## 2020-07-05 NOTE — CARE PLAN
Problem: Communication  Goal: The ability to communicate needs accurately and effectively will improve  Outcome: PROGRESSING AS EXPECTED     Problem: Safety  Goal: Will remain free from injury  Outcome: PROGRESSING AS EXPECTED     Problem: Venous Thromboembolism (VTW)/Deep Vein Thrombosis (DVT) Prevention:  Goal: Patient will participate in Venous Thrombosis (VTE)/Deep Vein Thrombosis (DVT)Prevention Measures  Outcome: PROGRESSING AS EXPECTED     Problem: Pain Management  Goal: Pain level will decrease to patient's comfort goal  Outcome: PROGRESSING AS EXPECTED

## 2020-07-05 NOTE — PROGRESS NOTES
Bedside report received from night RN; assumed pt care. Pt assessment complete. Pt aOx4. Reviewed plan of care with pt. Tele box on and rhythm verified. Chart and labs reviewed. Bed in lowest position, and 2 side rails up. Pt educated on call light; call light with in reach.

## 2020-07-05 NOTE — PROGRESS NOTES
Tele called to inform me that pt had another 8 beat run of v tach, I was in the patients room during said vtach, patient denied sob or cp or palpitations. Call light and possessions within reach, fall and safety precautions maintained.

## 2020-07-05 NOTE — PROGRESS NOTES
Hospital Medicine Daily Progress Note    Date of Service  7/5/2020    Chief Complaint  69 y.o. female admitted 6/30/2020 with junctional bradycardia rate 37 with cardiogenic shock, SOB.    Hospital Course    7/2:  This 68 yo female admitted by critical care to ICU for HR 37 junctional rhythm, COPD on 4 LPM NC at home, DM, hypothyroidism, hepatitis C, HTN, CHF, CKD stage 4 followed by Dr. Nava presented with worsening SOB, hypoxia and cough.  She was recently admitted 6/15/20 for COPD exacerbation and pulmonary edema, given amlodopine, metoprolol and lasix.   COVID 19 negative x3.  K was elevated 6.3.  Nephrology consulted and started HD with improvement of HR, BP and SOB.  3 HD sessions performed including today with 2 L removed.  She is feeling better, but still swollen in extremities, on 4 LPM NC baseline O2, ready for dc out of ICU.  Her BP is currently controlled at 97/69, no BP meds given at this point.  K normalized and Cr imporved.  Wbc increased to 23K on IV steroids.  I have started oral taper from solumedrol 60 IV q 12 to 40mg po daily.  It is unclear why patient was started on Rocephin on admission.  I have ordered a urine culture, no UA on admission or culture.  No evidence for pneumonia.  7/3:  Patient still with orthopnea, but no wheeze, wet cough noted today.  No HD today, waiting until tomorrow.  Has temporary triple lumen RIJ.  Wbc decreasing, Cr improving 3.24 to 2.58.  EF 80% but RVP elevated 55-60 with moderate mitral regurg noted. SR 76-92.  7/4:  Remains NSR on monitor.  No SOB, had 1.5 L removed during HD today.  Cr improving, urine output improving daily.  States having productive sputum thick brown, ordered sputum culture.  Lungs CTA b/l.  7/5:  No change, continues on baseline O2 4 LPM NC, no wheeze or crackles.*        Consultants/Specialty  Dr. Pompa  Critical care    Code Status  DNAR, I ok    Disposition   getting HD currently. Hep C +, no treatment yet, patient understands the  benefits of treatment and will need to pursue outpatient tx.  Resides at [facility].  OT/PT no needs.  Baseline O2 5 LPM NC.    Review of Systems  Review of Systems   Constitutional: Negative for chills, diaphoresis, fever and malaise/fatigue.   HENT: Negative for congestion and sore throat.    Eyes: Negative for pain and discharge.   Respiratory: Positive for shortness of breath. Negative for cough, hemoptysis, sputum production and wheezing.    Cardiovascular: Positive for leg swelling. Negative for chest pain, palpitations and claudication.   Gastrointestinal: Negative for abdominal pain, constipation, diarrhea, melena, nausea and vomiting.   Genitourinary: Negative for dysuria, frequency and urgency.   Musculoskeletal: Negative for back pain, joint pain, myalgias and neck pain.   Skin: Negative for itching and rash.   Neurological: Negative for dizziness, sensory change, speech change, focal weakness, loss of consciousness, weakness and headaches.   Endo/Heme/Allergies: Does not bruise/bleed easily.   Psychiatric/Behavioral: Negative for depression, substance abuse and suicidal ideas.        Physical Exam  Temp:  [36.1 °C (97 °F)-36.9 °C (98.4 °F)] 36.8 °C (98.3 °F)  Pulse:  [66-84] 82  Resp:  [16-20] 18  BP: (100-161)/(55-74) 118/74  SpO2:  [90 %-96 %] 93 %    Physical Exam  Vitals signs and nursing note reviewed.   Constitutional:       General: She is not in acute distress.     Appearance: She is well-developed. She is not diaphoretic.   HENT:      Head: Normocephalic and atraumatic.      Nose: Nose normal.      Mouth/Throat:      Pharynx: No oropharyngeal exudate.   Eyes:      General: No scleral icterus.        Right eye: No discharge.         Left eye: No discharge.      Conjunctiva/sclera: Conjunctivae normal.      Pupils: Pupils are equal, round, and reactive to light.   Neck:      Musculoskeletal: Normal range of motion and neck supple.      Thyroid: No thyromegaly.      Vascular: No JVD.      Trachea: No  tracheal deviation.   Cardiovascular:      Rate and Rhythm: Normal rate and regular rhythm.      Heart sounds: Normal heart sounds. No murmur. No friction rub. No gallop.    Pulmonary:      Effort: Pulmonary effort is normal. No respiratory distress.      Breath sounds: Normal breath sounds. No stridor. No wheezing or rales.      Comments: cta b/l  Chest:      Chest wall: No tenderness.   Abdominal:      General: Bowel sounds are normal. There is no distension.      Palpations: Abdomen is soft. There is no mass.      Tenderness: There is no abdominal tenderness. There is no guarding or rebound.   Musculoskeletal: Normal range of motion.      Right lower leg: Edema present.      Left lower leg: Edema present.   Lymphadenopathy:      Cervical: No cervical adenopathy.   Skin:     General: Skin is warm and dry.      Findings: No erythema or rash.   Neurological:      Mental Status: She is alert and oriented to person, place, and time.      Cranial Nerves: No cranial nerve deficit.      Motor: No abnormal muscle tone.      Coordination: Coordination normal.   Psychiatric:         Behavior: Behavior normal.         Thought Content: Thought content normal.         Judgment: Judgment normal.         Fluids  No intake or output data in the 24 hours ending 07/05/20 1406    Laboratory  Recent Labs     07/04/20  0215 07/05/20  0355 07/05/20  0455   WBC 19.3* 9.7 11.1*   RBC 2.72* 2.12* 2.38*   HEMOGLOBIN 8.1* 6.4* 7.1*   HEMATOCRIT 25.9* 20.4* 22.5*   MCV 95.2 96.2 94.5   MCH 29.8 30.2 29.8   MCHC 31.3* 31.4* 31.6*   RDW 51.9* 51.9* 51.2*   PLATELETCT 250 144* 175   MPV 9.2 8.7* 9.0     Recent Labs     07/04/20  0215 07/05/20  0355 07/05/20  0455   SODIUM 127* 134* 128*   POTASSIUM 4.3 3.4* 3.7   CHLORIDE 88* 97 91*   CO2 26 26 28   GLUCOSE 198* 142* 139*   BUN 51* 29* 32*   CREATININE 2.80* 1.68* 1.82*   CALCIUM 8.5 7.1* 8.2*                   Imaging  EC-ECHOCARDIOGRAM COMPLETE W/O CONT   Final Result      US-RUQ   Final  Result      Cholelithiasis with mild gallbladder wall thickening but no positive sonographic Troncoso sign. This could indicate chronic cholecystitis or incomplete distention      DX-CHEST-PORTABLE (1 VIEW)   Final Result      1.  Satisfactory position of new right IJV multilumen dialysis catheter. No pneumothorax identified.      2.  Unchanged mild volume overload pattern.      DX-CHEST-PORTABLE (1 VIEW)   Final Result      No acute cardiac or pulmonary abnormality is noted. Stable cardiomegaly with increased interstitial opacity diffusely.           Assessment/Plan  Hyperkalemia- (present on admission)  Assessment & Plan  Resolved with HD.    Acute renal failure superimposed on stage 4 chronic kidney disease (HCC)- (present on admission)  Assessment & Plan  Had recent admission, given lasix at discharge and new BP meds, metoprolol and norvasc.  HR 37 and low BP on admission.  Had HD emergently x 3 with improvement of SOB, 1 L off yesterday, 2 L off today.  Dr. Pompa following for HD ongoing determination, for now plans on TTS schedule.  Will need temporary HD catheter placed prior to dc and HD chair set up.  Patient states she gets better, then goes back to SOB for several months off and on.       Leukocytosis- (present on admission)  Assessment & Plan  2/2 steroids.    Acute exacerbation of chronic obstructive pulmonary disease (COPD) (HCC)- (present on admission)  Assessment & Plan  Patient admitted with COPD exacerbation  7/2 tapering solumedrol IV to po prednisone 40 mg daily x 4 days.  7/4:  Sputum culture ordered for productive cough.    DM type 2, uncontrolled, with renal complications (HCC)- (present on admission)  Assessment & Plan  SSI  Diabetic, renal diet.    Chronic hepatitis C without hepatic coma (HCC)- (present on admission)  Assessment & Plan  Will need outpatient treatment.  Elevated LFTs.    Junctional bradycardia- (present on admission)  Assessment & Plan  Combination of hyperkalemia, acute on  CKD4 and metoprolol, norvasc  Now resolved s/p HD.    Acute on chronic respiratory failure with hypoxia (HCC)- (present on admission)  Assessment & Plan  Initially admitted to ICU for respiratory support.  No intubation is noted in the record.    Anemia- (present on admission)  Assessment & Plan  2/2 CKD stage 4.    Pulmonary hypertension (HCC)- (present on admission)  Assessment & Plan  EF 80%  RVP elevated 55-60%.  Likely will need some diuretic for pulmonary edema.  Currently on HD with 2 L removed on 7/3.      Tobacco dependence- (present on admission)  Assessment & Plan  Recommend cessation    Chronic use of opiate drug for therapeutic purpose- (present on admission)  Assessment & Plan  On oxycodone 10 mg tid at home, continued in hospital    EDITH (obstructive sleep apnea)- (present on admission)  Assessment & Plan  ongoing    Hypothyroidism- (present on admission)  Assessment & Plan  Continue home meds.       VTE prophylaxis: heparin

## 2020-07-05 NOTE — PROGRESS NOTES
Informed Rudi with hosp team that patient had a 7 beat run of vtach, a symptomatic, Vitals stable. No further orders at this time.   Pt resting in bed, resp even and reg, call light and possessions within reach.

## 2020-07-06 ENCOUNTER — APPOINTMENT (OUTPATIENT)
Dept: RADIOLOGY | Facility: MEDICAL CENTER | Age: 70
DRG: 291 | End: 2020-07-06
Attending: HOSPITALIST
Payer: MEDICARE

## 2020-07-06 ENCOUNTER — PATIENT OUTREACH (OUTPATIENT)
Dept: HEALTH INFORMATION MANAGEMENT | Facility: OTHER | Age: 70
End: 2020-07-06

## 2020-07-06 PROBLEM — N39.0 UTI (URINARY TRACT INFECTION) DUE TO ENTEROCOCCUS: Status: ACTIVE | Noted: 2020-07-06

## 2020-07-06 PROBLEM — I15.0 RENOVASCULAR HYPERTENSION: Status: ACTIVE | Noted: 2020-07-06

## 2020-07-06 PROBLEM — B95.2 UTI (URINARY TRACT INFECTION) DUE TO ENTEROCOCCUS: Status: ACTIVE | Noted: 2020-07-06

## 2020-07-06 LAB
ALBUMIN SERPL BCP-MCNC: 3.2 G/DL (ref 3.2–4.9)
ALBUMIN/GLOB SERPL: 1.3 G/DL
ALP SERPL-CCNC: 72 U/L (ref 30–99)
ALT SERPL-CCNC: 150 U/L (ref 2–50)
ANION GAP SERPL CALC-SCNC: 12 MMOL/L (ref 7–16)
AST SERPL-CCNC: 57 U/L (ref 12–45)
BACTERIA UR CULT: ABNORMAL
BACTERIA UR CULT: ABNORMAL
BASOPHILS # BLD AUTO: 0.1 % (ref 0–1.8)
BASOPHILS # BLD: 0.02 K/UL (ref 0–0.12)
BILIRUB SERPL-MCNC: 0.5 MG/DL (ref 0.1–1.5)
BUN SERPL-MCNC: 46 MG/DL (ref 8–22)
CALCIUM SERPL-MCNC: 8.3 MG/DL (ref 8.5–10.5)
CHLORIDE SERPL-SCNC: 90 MMOL/L (ref 96–112)
CO2 SERPL-SCNC: 26 MMOL/L (ref 20–33)
CREAT SERPL-MCNC: 2.22 MG/DL (ref 0.5–1.4)
EOSINOPHIL # BLD AUTO: 0.26 K/UL (ref 0–0.51)
EOSINOPHIL NFR BLD: 1.7 % (ref 0–6.9)
ERYTHROCYTE [DISTWIDTH] IN BLOOD BY AUTOMATED COUNT: 50.4 FL (ref 35.9–50)
GLOBULIN SER CALC-MCNC: 2.4 G/DL (ref 1.9–3.5)
GLUCOSE BLD-MCNC: 177 MG/DL (ref 65–99)
GLUCOSE BLD-MCNC: 181 MG/DL (ref 65–99)
GLUCOSE BLD-MCNC: 201 MG/DL (ref 65–99)
GLUCOSE BLD-MCNC: 283 MG/DL (ref 65–99)
GLUCOSE SERPL-MCNC: 177 MG/DL (ref 65–99)
HCT VFR BLD AUTO: 22.8 % (ref 37–47)
HGB BLD-MCNC: 7.2 G/DL (ref 12–16)
IMM GRANULOCYTES # BLD AUTO: 0.36 K/UL (ref 0–0.11)
IMM GRANULOCYTES NFR BLD AUTO: 2.4 % (ref 0–0.9)
LYMPHOCYTES # BLD AUTO: 1.41 K/UL (ref 1–4.8)
LYMPHOCYTES NFR BLD: 9.3 % (ref 22–41)
MCH RBC QN AUTO: 29.9 PG (ref 27–33)
MCHC RBC AUTO-ENTMCNC: 31.6 G/DL (ref 33.6–35)
MCV RBC AUTO: 94.6 FL (ref 81.4–97.8)
MONOCYTES # BLD AUTO: 0.78 K/UL (ref 0–0.85)
MONOCYTES NFR BLD AUTO: 5.1 % (ref 0–13.4)
NEUTROPHILS # BLD AUTO: 12.34 K/UL (ref 2–7.15)
NEUTROPHILS NFR BLD: 81.4 % (ref 44–72)
NRBC # BLD AUTO: 0 K/UL
NRBC BLD-RTO: 0 /100 WBC
PLATELET # BLD AUTO: 214 K/UL (ref 164–446)
PMV BLD AUTO: 8.8 FL (ref 9–12.9)
POTASSIUM SERPL-SCNC: 4.1 MMOL/L (ref 3.6–5.5)
PROT SERPL-MCNC: 5.6 G/DL (ref 6–8.2)
RBC # BLD AUTO: 2.41 M/UL (ref 4.2–5.4)
SIGNIFICANT IND 70042: ABNORMAL
SITE SITE: ABNORMAL
SODIUM SERPL-SCNC: 128 MMOL/L (ref 135–145)
SOURCE SOURCE: ABNORMAL
WBC # BLD AUTO: 15.2 K/UL (ref 4.8–10.8)

## 2020-07-06 PROCEDURE — A9270 NON-COVERED ITEM OR SERVICE: HCPCS | Performed by: HOSPITALIST

## 2020-07-06 PROCEDURE — 85025 COMPLETE CBC W/AUTO DIFF WBC: CPT

## 2020-07-06 PROCEDURE — 700105 HCHG RX REV CODE 258: Performed by: HOSPITALIST

## 2020-07-06 PROCEDURE — 82962 GLUCOSE BLOOD TEST: CPT | Mod: 91

## 2020-07-06 PROCEDURE — 99233 SBSQ HOSP IP/OBS HIGH 50: CPT | Performed by: INTERNAL MEDICINE

## 2020-07-06 PROCEDURE — 770020 HCHG ROOM/CARE - TELE (206)

## 2020-07-06 PROCEDURE — A9270 NON-COVERED ITEM OR SERVICE: HCPCS | Performed by: INTERNAL MEDICINE

## 2020-07-06 PROCEDURE — 700102 HCHG RX REV CODE 250 W/ 637 OVERRIDE(OP): Performed by: HOSPITALIST

## 2020-07-06 PROCEDURE — 80053 COMPREHEN METABOLIC PANEL: CPT

## 2020-07-06 PROCEDURE — 99232 SBSQ HOSP IP/OBS MODERATE 35: CPT | Performed by: HOSPITALIST

## 2020-07-06 PROCEDURE — 700111 HCHG RX REV CODE 636 W/ 250 OVERRIDE (IP): Performed by: HOSPITALIST

## 2020-07-06 PROCEDURE — 700102 HCHG RX REV CODE 250 W/ 637 OVERRIDE(OP): Performed by: INTERNAL MEDICINE

## 2020-07-06 PROCEDURE — 700111 HCHG RX REV CODE 636 W/ 250 OVERRIDE (IP): Performed by: INTERNAL MEDICINE

## 2020-07-06 PROCEDURE — 71045 X-RAY EXAM CHEST 1 VIEW: CPT

## 2020-07-06 PROCEDURE — 86480 TB TEST CELL IMMUN MEASURE: CPT

## 2020-07-06 RX ORDER — IPRATROPIUM BROMIDE AND ALBUTEROL SULFATE 2.5; .5 MG/3ML; MG/3ML
3 SOLUTION RESPIRATORY (INHALATION)
Status: DISCONTINUED | OUTPATIENT
Start: 2020-07-06 | End: 2020-07-22 | Stop reason: HOSPADM

## 2020-07-06 RX ORDER — AZITHROMYCIN 250 MG/1
500 TABLET, FILM COATED ORAL DAILY
Status: DISCONTINUED | OUTPATIENT
Start: 2020-07-06 | End: 2020-07-06

## 2020-07-06 RX ORDER — HYDRALAZINE HYDROCHLORIDE 25 MG/1
25 TABLET, FILM COATED ORAL EVERY 8 HOURS PRN
Status: DISCONTINUED | OUTPATIENT
Start: 2020-07-06 | End: 2020-07-22 | Stop reason: HOSPADM

## 2020-07-06 RX ORDER — LINEZOLID 600 MG/1
600 TABLET, FILM COATED ORAL EVERY 12 HOURS
Status: COMPLETED | OUTPATIENT
Start: 2020-07-06 | End: 2020-07-13

## 2020-07-06 RX ADMIN — LINEZOLID 600 MG: 600 TABLET, FILM COATED ORAL at 18:19

## 2020-07-06 RX ADMIN — INSULIN HUMAN 1 UNITS: 100 INJECTION, SOLUTION PARENTERAL at 08:26

## 2020-07-06 RX ADMIN — INSULIN HUMAN 3 UNITS: 100 INJECTION, SOLUTION PARENTERAL at 11:59

## 2020-07-06 RX ADMIN — TRAZODONE HYDROCHLORIDE 300 MG: 150 TABLET ORAL at 21:00

## 2020-07-06 RX ADMIN — LEVOTHYROXINE SODIUM 75 MCG: 75 TABLET ORAL at 05:32

## 2020-07-06 RX ADMIN — UMECLIDINIUM BROMIDE AND VILANTEROL TRIFENATATE 1 PUFF: 62.5; 25 POWDER RESPIRATORY (INHALATION) at 07:54

## 2020-07-06 RX ADMIN — INSULIN HUMAN 2 UNITS: 100 INJECTION, SOLUTION PARENTERAL at 18:12

## 2020-07-06 RX ADMIN — TRAZODONE HYDROCHLORIDE 300 MG: 150 TABLET ORAL at 05:33

## 2020-07-06 RX ADMIN — OXYCODONE HYDROCHLORIDE 10 MG: 10 TABLET ORAL at 12:44

## 2020-07-06 RX ADMIN — INSULIN HUMAN 1 UNITS: 100 INJECTION, SOLUTION PARENTERAL at 20:57

## 2020-07-06 RX ADMIN — ROSUVASTATIN CALCIUM 10 MG: 20 TABLET, FILM COATED ORAL at 18:19

## 2020-07-06 RX ADMIN — PREDNISONE 40 MG: 20 TABLET ORAL at 05:35

## 2020-07-06 RX ADMIN — OXYCODONE HYDROCHLORIDE 10 MG: 10 TABLET ORAL at 03:08

## 2020-07-06 RX ADMIN — OXYCODONE HYDROCHLORIDE 10 MG: 10 TABLET ORAL at 20:12

## 2020-07-06 RX ADMIN — FLUTICASONE PROPIONATE 88 MCG: 44 AEROSOL, METERED RESPIRATORY (INHALATION) at 05:31

## 2020-07-06 RX ADMIN — DOCUSATE SODIUM 50 MG AND SENNOSIDES 8.6 MG 2 TABLET: 8.6; 5 TABLET, FILM COATED ORAL at 05:32

## 2020-07-06 RX ADMIN — CEFTRIAXONE SODIUM 2 G: 2 INJECTION, POWDER, FOR SOLUTION INTRAMUSCULAR; INTRAVENOUS at 11:43

## 2020-07-06 RX ADMIN — DULOXETINE HYDROCHLORIDE 60 MG: 60 CAPSULE, DELAYED RELEASE ORAL at 05:32

## 2020-07-06 RX ADMIN — AZITHROMYCIN MONOHYDRATE 500 MG: 250 TABLET ORAL at 11:43

## 2020-07-06 ASSESSMENT — ENCOUNTER SYMPTOMS
HEADACHES: 0
WHEEZING: 0
EYES NEGATIVE: 1
VOMITING: 0
MYALGIAS: 0
DIZZINESS: 0
SPUTUM PRODUCTION: 0
ORTHOPNEA: 1
DIAPHORESIS: 0
WEIGHT LOSS: 0
PALPITATIONS: 0
DEPRESSION: 0
FOCAL WEAKNESS: 0
SPEECH CHANGE: 0
ABDOMINAL PAIN: 0
NECK PAIN: 0
DIARRHEA: 0
EYE PAIN: 0
CONSTIPATION: 0
CLAUDICATION: 0
BACK PAIN: 0
BRUISES/BLEEDS EASILY: 0
SHORTNESS OF BREATH: 1
FLANK PAIN: 0
LOSS OF CONSCIOUSNESS: 0
FEVER: 0
SINUS PAIN: 0
NAUSEA: 0
CHILLS: 0
HEMOPTYSIS: 0
EYE DISCHARGE: 0
SORE THROAT: 0
WEAKNESS: 0
COUGH: 0
SENSORY CHANGE: 0

## 2020-07-06 ASSESSMENT — LIFESTYLE VARIABLES: SUBSTANCE_ABUSE: 0

## 2020-07-06 NOTE — PROGRESS NOTES
Hospital Medicine Daily Progress Note    Date of Service  7/6/2020    Chief Complaint  69 y.o. female admitted 6/30/2020 with junctional bradycardia rate 37 with cardiogenic shock, SOB.    Hospital Course    7/2:  This 68 yo female admitted by critical care to ICU for HR 37 junctional rhythm, COPD on 4 LPM NC at home, DM, hypothyroidism, hepatitis C, HTN, CHF, CKD stage 4 followed by Dr. Nava presented with worsening SOB, hypoxia and cough.  She was recently admitted 6/15/20 for COPD exacerbation and pulmonary edema, given amlodopine, metoprolol and lasix.   COVID 19 negative x3.  K was elevated 6.3.  Nephrology consulted and started HD with improvement of HR, BP and SOB.  3 HD sessions performed including today with 2 L removed.  She is feeling better, but still swollen in extremities, on 4 LPM NC baseline O2, ready for dc out of ICU.  Her BP is currently controlled at 97/69, no BP meds given at this point.  K normalized and Cr imporved.  Wbc increased to 23K on IV steroids.  I have started oral taper from solumedrol 60 IV q 12 to 40mg po daily.  It is unclear why patient was started on Rocephin on admission.  I have ordered a urine culture, no UA on admission or culture.  No evidence for pneumonia.  7/3:  Patient still with orthopnea, but no wheeze, wet cough noted today.  No HD today, waiting until tomorrow.  Has temporary triple lumen RIJ.  Wbc decreasing, Cr improving 3.24 to 2.58.  EF 80% but RVP elevated 55-60 with moderate mitral regurg noted. SR 76-92.  7/4:  Remains NSR on monitor.  No SOB, had 1.5 L removed during HD today.  Cr improving, urine output improving daily.  States having productive sputum thick brown, ordered sputum culture.  Lungs CTA b/l.  7/5:  No change, continues on baseline O2 4 LPM NC, no wheeze or crackles.  7/6:  C/o productive sputum, had 5 days of Rocephin, zithromax completed.  Ordered sputum culture, sent today with thick brown.  Wbc increased last 2 days, repeat CXR no  infiltrate, improved pulmonary edema since 6/30 CXR.  + VRE on urine culture, started zyvox bid x 7d ays.*        Consultants/Specialty  Dr. Pompa  Critical care    Code Status  DNAR, I ok    Disposition   getting HD currently. Hep C +, no treatment yet, patient understands the benefits of treatment and will need to pursue outpatient tx.  Resides at [facility].  OT/PT no needs.  Baseline O2 5 LPM NC.    Review of Systems  Review of Systems   Constitutional: Negative for chills, diaphoresis, fever and malaise/fatigue.   HENT: Negative for congestion and sore throat.    Eyes: Negative for pain and discharge.   Respiratory: Positive for shortness of breath. Negative for cough, hemoptysis, sputum production and wheezing.    Cardiovascular: Positive for leg swelling. Negative for chest pain, palpitations and claudication.   Gastrointestinal: Negative for abdominal pain, constipation, diarrhea, melena, nausea and vomiting.   Genitourinary: Negative for dysuria, frequency and urgency.   Musculoskeletal: Negative for back pain, joint pain, myalgias and neck pain.   Skin: Negative for itching and rash.   Neurological: Negative for dizziness, sensory change, speech change, focal weakness, loss of consciousness, weakness and headaches.   Endo/Heme/Allergies: Does not bruise/bleed easily.   Psychiatric/Behavioral: Negative for depression, substance abuse and suicidal ideas.        Physical Exam  Temp:  [35.9 °C (96.7 °F)-37 °C (98.6 °F)] 36.4 °C (97.5 °F)  Pulse:  [68-84] 68  Resp:  [18-21] 18  BP: (124-175)/(58-75) 133/72  SpO2:  [91 %-97 %] 97 %    Physical Exam  Vitals signs and nursing note reviewed.   Constitutional:       General: She is not in acute distress.     Appearance: She is well-developed. She is not diaphoretic.   HENT:      Head: Normocephalic and atraumatic.      Nose: Nose normal.      Mouth/Throat:      Pharynx: No oropharyngeal exudate.   Eyes:      General: No scleral icterus.        Right eye: No discharge.          Left eye: No discharge.      Conjunctiva/sclera: Conjunctivae normal.      Pupils: Pupils are equal, round, and reactive to light.   Neck:      Musculoskeletal: Normal range of motion and neck supple.      Thyroid: No thyromegaly.      Vascular: No JVD.      Trachea: No tracheal deviation.   Cardiovascular:      Rate and Rhythm: Normal rate and regular rhythm.      Heart sounds: Normal heart sounds. No murmur. No friction rub. No gallop.    Pulmonary:      Effort: Pulmonary effort is normal. No respiratory distress.      Breath sounds: Normal breath sounds. No stridor. No wheezing or rales.      Comments: cta b/l  Chest:      Chest wall: No tenderness.   Abdominal:      General: Bowel sounds are normal. There is no distension.      Palpations: Abdomen is soft. There is no mass.      Tenderness: There is no abdominal tenderness. There is no guarding or rebound.   Musculoskeletal: Normal range of motion.      Right lower leg: Edema present.      Left lower leg: Edema present.   Lymphadenopathy:      Cervical: No cervical adenopathy.   Skin:     General: Skin is warm and dry.      Findings: No erythema or rash.   Neurological:      Mental Status: She is alert and oriented to person, place, and time.      Cranial Nerves: No cranial nerve deficit.      Motor: No abnormal muscle tone.      Coordination: Coordination normal.   Psychiatric:         Behavior: Behavior normal.         Thought Content: Thought content normal.         Judgment: Judgment normal.         Fluids    Intake/Output Summary (Last 24 hours) at 7/6/2020 1700  Last data filed at 7/6/2020 1600  Gross per 24 hour   Intake 480 ml   Output 950 ml   Net -470 ml       Laboratory  Recent Labs     07/05/20  0355 07/05/20  0455 07/06/20  0341   WBC 9.7 11.1* 15.2*   RBC 2.12* 2.38* 2.41*   HEMOGLOBIN 6.4* 7.1* 7.2*   HEMATOCRIT 20.4* 22.5* 22.8*   MCV 96.2 94.5 94.6   MCH 30.2 29.8 29.9   MCHC 31.4* 31.6* 31.6*   RDW 51.9* 51.2* 50.4*   PLATELETCT 144* 175  214   MPV 8.7* 9.0 8.8*     Recent Labs     07/05/20  0355 07/05/20  0455 07/06/20  0341   SODIUM 134* 128* 128*   POTASSIUM 3.4* 3.7 4.1   CHLORIDE 97 91* 90*   CO2 26 28 26   GLUCOSE 142* 139* 177*   BUN 29* 32* 46*   CREATININE 1.68* 1.82* 2.22*   CALCIUM 7.1* 8.2* 8.3*                   Imaging  DX-CHEST-PORTABLE (1 VIEW)   Final Result         No significant interval change.      Unchanged diffuse interstitial prominence.      EC-ECHOCARDIOGRAM COMPLETE W/O CONT   Final Result      US-RUQ   Final Result      Cholelithiasis with mild gallbladder wall thickening but no positive sonographic Troncoso sign. This could indicate chronic cholecystitis or incomplete distention      DX-CHEST-PORTABLE (1 VIEW)   Final Result      1.  Satisfactory position of new right IJV multilumen dialysis catheter. No pneumothorax identified.      2.  Unchanged mild volume overload pattern.      DX-CHEST-PORTABLE (1 VIEW)   Final Result      No acute cardiac or pulmonary abnormality is noted. Stable cardiomegaly with increased interstitial opacity diffusely.           Assessment/Plan  Hyperkalemia- (present on admission)  Assessment & Plan  Resolved with HD.    Acute renal failure superimposed on stage 4 chronic kidney disease (HCC)- (present on admission)  Assessment & Plan  Had recent admission, given lasix at discharge and new BP meds, metoprolol and norvasc.  HR 37 and low BP on admission.  Had HD emergently x 3 with improvement of SOB, 1 L off yesterday, 2 L off today.  Dr. Pompa following for HD ongoing determination, for now plans on TTS schedule.  Will need temporary HD catheter placed prior to dc and HD chair set up.  Patient states she gets better, then goes back to SOB for several months off and on.       Pneumonia due to infectious organism  Assessment & Plan  Had been treated with 5 days rocephin, 3 days zithromax since admission.  CXR improved edema, no definite infiltrate to suggest pneumonia  C/o productive sputum,  ordered sputum culture, hold on abx for now.    Leukocytosis- (present on admission)  Assessment & Plan  2/2 steroids.    Acute exacerbation of chronic obstructive pulmonary disease (COPD) (HCC)- (present on admission)  Assessment & Plan  Patient admitted with COPD exacerbation  7/2 tapering solumedrol IV to po prednisone 40 mg daily x 4 days.  7/4:  Sputum culture ordered for productive cough.    DM type 2, uncontrolled, with renal complications (HCC)- (present on admission)  Assessment & Plan  SSI  Diabetic, renal diet.    UTI (urinary tract infection) due to Enterococcus  Assessment & Plan  VRE on urine culture ss zyvox  Increasing wbc daily.  7/6:  Treat zyvox x 7 days.    Renovascular hypertension  Assessment & Plan  Ordered PRN BP meds for sbp >160 or DBP >90 hydralazine.  Monitor  Avoid BB since she went into cardiogenic shock 2/2 bradycardia.    Chronic hepatitis C without hepatic coma (HCC)- (present on admission)  Assessment & Plan  Will need outpatient treatment.  Elevated LFTs.    Junctional bradycardia- (present on admission)  Assessment & Plan  Combination of hyperkalemia, acute on CKD4 and metoprolol, norvasc  Now resolved s/p HD.    Acute on chronic respiratory failure with hypoxia (HCC)- (present on admission)  Assessment & Plan  Initially admitted to ICU for respiratory support.  No intubation is noted in the record.    Anemia- (present on admission)  Assessment & Plan  2/2 CKD stage 4.    Pulmonary hypertension (HCC)- (present on admission)  Assessment & Plan  EF 80%  RVP elevated 55-60%.  Likely will need some diuretic for pulmonary edema.  Currently on HD with 2 L removed on 7/3.      Tobacco dependence- (present on admission)  Assessment & Plan  Recommend cessation    Chronic use of opiate drug for therapeutic purpose- (present on admission)  Assessment & Plan  On oxycodone 10 mg tid at home, continued in hospital    EDITH (obstructive sleep apnea)- (present on admission)  Assessment &  Plan  ongoing    Hypothyroidism- (present on admission)  Assessment & Plan  Continue home meds.       VTE prophylaxis: heparin

## 2020-07-06 NOTE — PROGRESS NOTES
Bedside report received, patient wake sitting up in bed, tele monitor in place, on 5 L O2 via NC. RN assessed needs, no additional needs at this time. Call light within reach, patient verbalized the understanding on the need to call when needing assistance. Bed locked in lowest position, non skid socks on, bed rails up x2, hourly rounding in place.

## 2020-07-06 NOTE — ASSESSMENT & PLAN NOTE
Ordered PRN BP meds for sbp >160 or DBP >90 hydralazine.  Monitor  Avoid BB since she went into cardiogenic shock 2/2 bradycardia.

## 2020-07-06 NOTE — CARE PLAN
Problem: Communication  Goal: The ability to communicate needs accurately and effectively will improve  Outcome: PROGRESSING AS EXPECTED     Problem: Safety  Goal: Will remain free from falls  Outcome: PROGRESSING AS EXPECTED  RN will provide patient education regarding fall risk  RN will provide patient education on the use of his call light  Patient will demonstrate how to use call light  Patient will verbalize appropriate times to use call light    Problem: Knowledge Deficit  Goal: Knowledge of the prescribed therapeutic regimen will improve  Outcome: PROGRESSING AS EXPECTED  Pt updated on POC, tests, and medications. Pt verbalizes understanding and has no further questions at this time. Pt educated on calling for any more questions.

## 2020-07-06 NOTE — ASSESSMENT & PLAN NOTE
Had been treated with 5 days rocephin, 3 days zithromax since admission.  CXR improved edema, no definite infiltrate to suggest pneumonia  Cultures neg  No need for abx

## 2020-07-06 NOTE — PROGRESS NOTES
Nephrology Daily Progress Note    Date of Service  7/6/2020    Chief Complaint  69 y.o. female with a history of CKD 3, COPD who presented 6/30/2020 with shortness of breath, found to have bradycardia, hypotension, and HAN.    Interval Problem Update  7/1 -patient had dialysis yesterday with no fluid removed, and resolution of shock on completion of dialysis.  Patient had 500 mL of urine output documented last 24 hours.  Patient had dialysis again this morning with 1 L removed.  Patient says her breathing is better.  Denies chest pain, shortness of breath at this time.  7/3 -patient had third session of dialysis yesterday with 2 L removed.  Tolerated well.  Denies chest pain this morning.  Complains of continued shortness of breath, but improved from admission.  7/5 -patient had dialysis yesterday with 1.5 L removed.  Patient says she is urinating, but no urine output being recorded.  Patient says her shortness of breath is still there, but better compared to admission.  Patient denies chest pain.  7/6 -doing better, less SOB  Scheduled for HD TTS    Review of Systems  Review of Systems   Constitutional: Negative for chills, fever, malaise/fatigue and weight loss.   HENT: Negative for congestion, hearing loss and sinus pain.    Eyes: Negative.    Respiratory: Positive for shortness of breath. Negative for cough, hemoptysis and wheezing.    Cardiovascular: Positive for orthopnea. Negative for chest pain, palpitations and leg swelling.   Gastrointestinal: Negative for abdominal pain, diarrhea, nausea and vomiting.   Genitourinary: Negative for dysuria, flank pain, frequency, hematuria and urgency.   Skin: Negative.    All other systems reviewed and are negative.       Physical Exam  Temp:  [35.9 °C (96.7 °F)-37 °C (98.6 °F)] 37 °C (98.6 °F)  Pulse:  [69-84] 77  Resp:  [18-21] 20  BP: (124-175)/(58-75) 124/75  SpO2:  [91 %-97 %] 97 %    Physical Exam   Constitutional: She is oriented to person, place, and time. She  appears well-developed and well-nourished. No distress.   HENT:   Head: Normocephalic and atraumatic.   Nose: Nose normal.   Mouth/Throat: Oropharynx is clear and moist.   Eyes: Pupils are equal, round, and reactive to light. Conjunctivae and EOM are normal.   Neck: Normal range of motion. Neck supple. No thyromegaly present.   Cardiovascular: Normal rate and regular rhythm. Exam reveals no gallop and no friction rub.   Pulmonary/Chest: Effort normal and breath sounds normal. No respiratory distress. She has no wheezes. She has no rales.   Abdominal: Soft. Bowel sounds are normal. She exhibits no distension and no mass. There is no abdominal tenderness. There is no guarding.   Musculoskeletal:         General: Edema present.      Comments: Trace pedal edema   Neurological: She is alert and oriented to person, place, and time. No cranial nerve deficit.   Skin: Skin is warm. No rash noted. No erythema.   Nursing note and vitals reviewed.  Access: Right IJ temporary dialysis catheter.      Fluids    Intake/Output Summary (Last 24 hours) at 7/6/2020 1415  Last data filed at 7/6/2020 1200  Gross per 24 hour   Intake 480 ml   Output 550 ml   Net -70 ml       Laboratory  Labs reviewed, pertinent labs below.  Recent Labs     07/05/20 0355 07/05/20 0455 07/06/20  0341   WBC 9.7 11.1* 15.2*   RBC 2.12* 2.38* 2.41*   HEMOGLOBIN 6.4* 7.1* 7.2*   HEMATOCRIT 20.4* 22.5* 22.8*   MCV 96.2 94.5 94.6   MCH 30.2 29.8 29.9   MCHC 31.4* 31.6* 31.6*   RDW 51.9* 51.2* 50.4*   PLATELETCT 144* 175 214   MPV 8.7* 9.0 8.8*     Recent Labs     07/05/20  0355 07/05/20  0455 07/06/20  0341   SODIUM 134* 128* 128*   POTASSIUM 3.4* 3.7 4.1   CHLORIDE 97 91* 90*   CO2 26 28 26   GLUCOSE 142* 139* 177*   BUN 29* 32* 46*   CREATININE 1.68* 1.82* 2.22*   CALCIUM 7.1* 8.2* 8.3*               URINALYSIS:  Lab Results   Component Value Date/Time    COLORURINE DK Yellow 06/15/2019 0245    CLARITY Turbid (A) 06/15/2019 0245    SPECGRAVITY 1.020  06/15/2019 0245    PHURINE 5.0 06/15/2019 0245    KETONES Trace (A) 06/15/2019 0245    PROTEINURIN 300 (A) 06/15/2019 0245    BILIRUBINUR Negative 06/15/2019 0245    UROBILU 1.0 06/15/2019 0245    NITRITE Negative 06/15/2019 0245    LEUKESTERAS Negative 06/15/2019 0245    OCCULTBLOOD Negative 06/15/2019 0245     UPC  No results found for: TOTPROTUR No results found for: CREATININEU      Imaging reviewed  DX-CHEST-PORTABLE (1 VIEW)   Final Result         No significant interval change.      Unchanged diffuse interstitial prominence.      EC-ECHOCARDIOGRAM COMPLETE W/O CONT   Final Result      US-RUQ   Final Result      Cholelithiasis with mild gallbladder wall thickening but no positive sonographic Troncoso sign. This could indicate chronic cholecystitis or incomplete distention      DX-CHEST-PORTABLE (1 VIEW)   Final Result      1.  Satisfactory position of new right IJV multilumen dialysis catheter. No pneumothorax identified.      2.  Unchanged mild volume overload pattern.      DX-CHEST-PORTABLE (1 VIEW)   Final Result      No acute cardiac or pulmonary abnormality is noted. Stable cardiomegaly with increased interstitial opacity diffusely.            Current Facility-Administered Medications   Medication Dose Route Frequency Provider Last Rate Last Dose   • ipratropium-albuterol (DUONEB) nebulizer solution  3 mL Nebulization Q2HRS PRN (RT) Christina Calderon M.D.       • hydrALAZINE (APRESOLINE) tablet 25 mg  25 mg Oral Q8HRS PRN Christina Calderon M.D.       • heparin injection 2,000 Units  2,000 Units Intravenous DIALYSIS PRN Eric Pompa M.D.   2,000 Units at 07/04/20 0830   • oxyCODONE immediate release (ROXICODONE) tablet 10 mg  10 mg Oral TID PRN Christina Calderon M.D.   10 mg at 07/06/20 1244   • lactulose 20 GM/30ML solution 30 mL  30 mL Oral BID W/MEALS PRN Heber Armstrong M.D.       • NS (BOLUS) infusion 250 mL  250 mL Intravenous DIALYSIS PRN Eric Pompa M.D.       • albumin human 25% solution 12.5 g  12.5 g  Intravenous DIALYSIS PRN Eric Pompa M.D. 150 mL/hr at 06/30/20 1513 12.5 g at 06/30/20 1513   • Respiratory Therapy Consult   Nebulization Continuous RT Roger Florence Jr. D.O.       • DULoxetine (CYMBALTA) capsule 60 mg  60 mg Oral DAILY ROBY Sarmiento Jr..O.   60 mg at 07/06/20 0532   • hydrOXYzine HCl (ATARAX) tablet 25 mg  25 mg Oral TID PRN ROBY Sarmiento Jr..O.   25 mg at 07/03/20 0027   • levothyroxine (SYNTHROID) tablet 75 mcg  75 mcg Oral AM ES ROBY Sarmiento Jr..O.   75 mcg at 07/06/20 0532   • lidocaine (LIDODERM) 5 % 1 Patch  1 Patch Transdermal Q24HRS PRN Roger Florence Jr. D.O.   1 Patch at 07/04/20 2211   • traZODone (DESYREL) tablet 300 mg  300 mg Oral BID Roger Florence Jr. D.O.   300 mg at 07/06/20 0533   • rosuvastatin (CRESTOR) tablet 10 mg  10 mg Oral Q EVENING ROBY Sarmiento Jr..O.   10 mg at 07/05/20 1651   • senna-docusate (PERICOLACE or SENOKOT S) 8.6-50 MG per tablet 2 Tab  2 Tab Oral BID ROBY Sarmiento Jr..O.   2 Tab at 07/06/20 0532    And   • polyethylene glycol/lytes (MIRALAX) PACKET 1 Packet  1 Packet Oral QDAY PRN ROBY Sarmiento Jr..O.   1 Packet at 07/02/20 0500    And   • magnesium hydroxide (MILK OF MAGNESIA) suspension 30 mL  30 mL Oral QDAY PRN ROBY Sarmiento Jr..O.        And   • bisacodyl (DULCOLAX) suppository 10 mg  10 mg Rectal QDAY PRN Roger Florence Jr., D.O.       • heparin injection 5,000 Units  5,000 Units Subcutaneous Q8HRS ROBY Sarmiento Jr..O.   5,000 Units at 07/01/20 0540   • ondansetron (ZOFRAN) syringe/vial injection 4 mg  4 mg Intravenous Q4HRS PRN ROBY Sarmiento Jr..O.       • ondansetron (ZOFRAN ODT) dispertab 4 mg  4 mg Oral Q4HRS PRN ROBY Sarmiento Jr..O.       • Pharmacy Consult Request ...Pain Management Review 1 Each  1 Each Other PHARMACY TO DOSE ROBY Sarmeinto Jr..O.       • lactated ringers infusion (BOLUS)  500 mL Intravenous Once PRN ROBY Sarmiento Jr..O.       • umeclidinium-vilanterol (ANORO  ELLIPTA) inhaler 1 Puff  1 Puff Inhalation QDAILY (RT) GHULAM Sarmiento Jr.O.   1 Puff at 07/06/20 0754   • fluticasone (FLOVENT HFA) 44 MCG/ACT inhaler 88 mcg  2 Puff Inhalation DAILY GHULAM Sarmiento Jr.O.   88 mcg at 07/06/20 0531   • ipratropium-albuterol (DUONEB) nebulizer solution  3 mL Nebulization Q2HRS PRN (RT) Heber Armstrong M.D.   3 mL at 07/02/20 0317   • heparin injection 2,400 Units  2,400 Units Intracatheter ACUTE DIALYSIS PRN Eric Pompa M.D.   2,400 Units at 07/04/20 0843   • insulin regular (HUMULIN R) injection 1-6 Units  1-6 Units Subcutaneous 4X/DAY ACHLUIS Glover M.D.   3 Units at 07/06/20 1159    And   • glucose 4 g chewable tablet 16 g  16 g Oral Q15 MIN PRN Bob Glover M.D.        And   • dextrose 50% (D50W) injection 50 mL  50 mL Intravenous Q15 MIN PRN Bob Glover M.D.             Assessment/Plan  69 y.o. female with a history of CKD 3, COPD who presented 6/30/2020 with shortness of breath, found to have bradycardia, hypotension, and HAN.     1.  HAN on CKD stage III - on HD TTS    2.  HTN: BP well controlled     3.  Volume: better with UF/HD, check BNP    4.  Hyponatremia: avoid hypotonic fluids.  Check labs daily.     5.  Anemia: low Hb stable, iron panel WNL     6.  Cardiogenic shock, from hemodynamically significant bradycardia.  Now improved.  Defer further management to primary team and cardiology.     Recs: to monitor foe recovery             No need for emergent dialysis today             Scheduled HD TTS             Low Na diet             Daily labs             Check BNP

## 2020-07-07 ENCOUNTER — PATIENT OUTREACH (OUTPATIENT)
Dept: HEALTH INFORMATION MANAGEMENT | Facility: OTHER | Age: 70
End: 2020-07-07

## 2020-07-07 LAB
ALBUMIN SERPL BCP-MCNC: 3.2 G/DL (ref 3.2–4.9)
ALBUMIN/GLOB SERPL: 1.4 G/DL
ALP SERPL-CCNC: 70 U/L (ref 30–99)
ALT SERPL-CCNC: 133 U/L (ref 2–50)
ANION GAP SERPL CALC-SCNC: 10 MMOL/L (ref 7–16)
AST SERPL-CCNC: 49 U/L (ref 12–45)
BASOPHILS # BLD AUTO: 0.2 % (ref 0–1.8)
BASOPHILS # BLD: 0.03 K/UL (ref 0–0.12)
BILIRUB SERPL-MCNC: 0.3 MG/DL (ref 0.1–1.5)
BUN SERPL-MCNC: 56 MG/DL (ref 8–22)
CALCIUM SERPL-MCNC: 8.3 MG/DL (ref 8.5–10.5)
CHLORIDE SERPL-SCNC: 92 MMOL/L (ref 96–112)
CO2 SERPL-SCNC: 27 MMOL/L (ref 20–33)
CREAT SERPL-MCNC: 2.41 MG/DL (ref 0.5–1.4)
EOSINOPHIL # BLD AUTO: 0.16 K/UL (ref 0–0.51)
EOSINOPHIL NFR BLD: 1 % (ref 0–6.9)
ERYTHROCYTE [DISTWIDTH] IN BLOOD BY AUTOMATED COUNT: 51.1 FL (ref 35.9–50)
GLOBULIN SER CALC-MCNC: 2.3 G/DL (ref 1.9–3.5)
GLUCOSE BLD-MCNC: 123 MG/DL (ref 65–99)
GLUCOSE BLD-MCNC: 139 MG/DL (ref 65–99)
GLUCOSE BLD-MCNC: 150 MG/DL (ref 65–99)
GLUCOSE BLD-MCNC: 188 MG/DL (ref 65–99)
GLUCOSE SERPL-MCNC: 155 MG/DL (ref 65–99)
HCT VFR BLD AUTO: 22.3 % (ref 37–47)
HGB BLD-MCNC: 7 G/DL (ref 12–16)
IMM GRANULOCYTES # BLD AUTO: 0.33 K/UL (ref 0–0.11)
IMM GRANULOCYTES NFR BLD AUTO: 2.1 % (ref 0–0.9)
LYMPHOCYTES # BLD AUTO: 1.1 K/UL (ref 1–4.8)
LYMPHOCYTES NFR BLD: 7 % (ref 22–41)
MCH RBC QN AUTO: 30 PG (ref 27–33)
MCHC RBC AUTO-ENTMCNC: 31.4 G/DL (ref 33.6–35)
MCV RBC AUTO: 95.7 FL (ref 81.4–97.8)
MONOCYTES # BLD AUTO: 0.84 K/UL (ref 0–0.85)
MONOCYTES NFR BLD AUTO: 5.3 % (ref 0–13.4)
NEUTROPHILS # BLD AUTO: 13.33 K/UL (ref 2–7.15)
NEUTROPHILS NFR BLD: 84.4 % (ref 44–72)
NRBC # BLD AUTO: 0 K/UL
NRBC BLD-RTO: 0 /100 WBC
NT-PROBNP SERPL IA-MCNC: 6820 PG/ML (ref 0–125)
PHOSPHATE SERPL-MCNC: 3.8 MG/DL (ref 2.5–4.5)
PLATELET # BLD AUTO: 205 K/UL (ref 164–446)
PMV BLD AUTO: 8.8 FL (ref 9–12.9)
POTASSIUM SERPL-SCNC: 4.6 MMOL/L (ref 3.6–5.5)
PROT SERPL-MCNC: 5.5 G/DL (ref 6–8.2)
RBC # BLD AUTO: 2.33 M/UL (ref 4.2–5.4)
SODIUM SERPL-SCNC: 129 MMOL/L (ref 135–145)
WBC # BLD AUTO: 15.8 K/UL (ref 4.8–10.8)

## 2020-07-07 PROCEDURE — 94640 AIRWAY INHALATION TREATMENT: CPT

## 2020-07-07 PROCEDURE — 51798 US URINE CAPACITY MEASURE: CPT

## 2020-07-07 PROCEDURE — 5A1D70Z PERFORMANCE OF URINARY FILTRATION, INTERMITTENT, LESS THAN 6 HOURS PER DAY: ICD-10-PCS | Performed by: INTERNAL MEDICINE

## 2020-07-07 PROCEDURE — 85025 COMPLETE CBC W/AUTO DIFF WBC: CPT

## 2020-07-07 PROCEDURE — 99232 SBSQ HOSP IP/OBS MODERATE 35: CPT | Performed by: INTERNAL MEDICINE

## 2020-07-07 PROCEDURE — 94760 N-INVAS EAR/PLS OXIMETRY 1: CPT

## 2020-07-07 PROCEDURE — 83880 ASSAY OF NATRIURETIC PEPTIDE: CPT

## 2020-07-07 PROCEDURE — 84100 ASSAY OF PHOSPHORUS: CPT

## 2020-07-07 PROCEDURE — 700102 HCHG RX REV CODE 250 W/ 637 OVERRIDE(OP): Performed by: INTERNAL MEDICINE

## 2020-07-07 PROCEDURE — A9270 NON-COVERED ITEM OR SERVICE: HCPCS | Performed by: INTERNAL MEDICINE

## 2020-07-07 PROCEDURE — 700102 HCHG RX REV CODE 250 W/ 637 OVERRIDE(OP): Performed by: HOSPITALIST

## 2020-07-07 PROCEDURE — 90935 HEMODIALYSIS ONE EVALUATION: CPT

## 2020-07-07 PROCEDURE — 82962 GLUCOSE BLOOD TEST: CPT

## 2020-07-07 PROCEDURE — A9270 NON-COVERED ITEM OR SERVICE: HCPCS | Performed by: HOSPITALIST

## 2020-07-07 PROCEDURE — 80053 COMPREHEN METABOLIC PANEL: CPT

## 2020-07-07 PROCEDURE — 770020 HCHG ROOM/CARE - TELE (206)

## 2020-07-07 RX ADMIN — OXYCODONE HYDROCHLORIDE 10 MG: 10 TABLET ORAL at 10:09

## 2020-07-07 RX ADMIN — LINEZOLID 600 MG: 600 TABLET, FILM COATED ORAL at 05:28

## 2020-07-07 RX ADMIN — TRAZODONE HYDROCHLORIDE 300 MG: 150 TABLET ORAL at 21:17

## 2020-07-07 RX ADMIN — HEPARIN SODIUM 2400 UNITS: 1000 INJECTION, SOLUTION INTRAVENOUS; SUBCUTANEOUS at 18:06

## 2020-07-07 RX ADMIN — ROSUVASTATIN CALCIUM 10 MG: 20 TABLET, FILM COATED ORAL at 18:08

## 2020-07-07 RX ADMIN — FLUTICASONE PROPIONATE 88 MCG: 44 AEROSOL, METERED RESPIRATORY (INHALATION) at 05:28

## 2020-07-07 RX ADMIN — HEPARIN SODIUM 2000 UNITS: 1000 INJECTION, SOLUTION INTRAVENOUS; SUBCUTANEOUS at 14:39

## 2020-07-07 RX ADMIN — UMECLIDINIUM BROMIDE AND VILANTEROL TRIFENATATE 1 PUFF: 62.5; 25 POWDER RESPIRATORY (INHALATION) at 06:32

## 2020-07-07 RX ADMIN — DULOXETINE HYDROCHLORIDE 60 MG: 60 CAPSULE, DELAYED RELEASE ORAL at 05:28

## 2020-07-07 RX ADMIN — OXYCODONE HYDROCHLORIDE 10 MG: 10 TABLET ORAL at 18:08

## 2020-07-07 RX ADMIN — LEVOTHYROXINE SODIUM 75 MCG: 75 TABLET ORAL at 05:29

## 2020-07-07 RX ADMIN — LINEZOLID 600 MG: 600 TABLET, FILM COATED ORAL at 18:08

## 2020-07-07 RX ADMIN — TRAZODONE HYDROCHLORIDE 300 MG: 150 TABLET ORAL at 05:28

## 2020-07-07 RX ADMIN — INSULIN HUMAN 1 UNITS: 100 INJECTION, SOLUTION PARENTERAL at 21:22

## 2020-07-07 ASSESSMENT — ENCOUNTER SYMPTOMS
LOSS OF CONSCIOUSNESS: 0
NECK PAIN: 0
WHEEZING: 0
CONSTIPATION: 0
MYALGIAS: 1
WEAKNESS: 1
PALPITATIONS: 0
SENSORY CHANGE: 0
DEPRESSION: 0
DIZZINESS: 0
SPEECH CHANGE: 0
COUGH: 0
VOMITING: 0
CHILLS: 0
HEMOPTYSIS: 0
FLANK PAIN: 0
HEADACHES: 0
BACK PAIN: 0
SHORTNESS OF BREATH: 1
BRUISES/BLEEDS EASILY: 0
EYE DISCHARGE: 0
SPUTUM PRODUCTION: 0
EYE PAIN: 0
MEMORY LOSS: 0
FEVER: 0
CLAUDICATION: 0
NAUSEA: 0
FOCAL WEAKNESS: 0
SORE THROAT: 0
DIARRHEA: 0
ABDOMINAL PAIN: 0

## 2020-07-07 ASSESSMENT — LIFESTYLE VARIABLES: SUBSTANCE_ABUSE: 0

## 2020-07-07 ASSESSMENT — FIBROSIS 4 INDEX: FIB4 SCORE: 1.45

## 2020-07-07 NOTE — PROGRESS NOTES
[facility] Progress Note     Hemodialysis treatment ordered today per Dr. Bansal x 3 hours.   Treatment initiated at 1506, ended at 1806.         Blood pressure elevated pre-intra-post treatment.   See paper flow sheet for details.      Net UF 3,000 mL.      Post tx, CVC flushed with saline then locked with heparin 1000 units/mL per designated amount in each wing then clamped and capped.   Aspirate heparin prior to next CVC use.     Report given to Primary RN.

## 2020-07-07 NOTE — PROGRESS NOTES
Bedside report received, patient awake sitting up in bed, tele monitor in place, on 5 L O2 via NC. RN assessed needs, no additional needs at this time. Call light within reach, patient verbalized the understanding on the need to call when needing assistance. Bed locked in lowest position, non skid socks on, bed rails up x2, hourly rounding in place.

## 2020-07-07 NOTE — DISCHARGE PLANNING
1030--During chart review noted several nephrology notes mentioning the potential for need for OP dialysis.  Called and spoke with Makenna in dialysis and she stated that she has requested the initial lab work in case they do decide she needs long term dialysis, but she doesn't anticipate an answer until later in the week.  States that she would either go to [place] or [place] for her dialysis.      1038--Attempted to call and speak with staff @ [facility] to verify whether they would be able to transport patient 3 x week, had to leave a .    1145--Received a call back from Sherry avila/ [facility].  She requested further information about patient's ability to independently complete her ADLs.  Shared with her the information that PT/OT had documented.  Patient has been transferring by herself.  Sherry stated that [facility] would only be able to provide transportation to dialysis 1 day a week and that patient will have to make her own arrangements for the other 2 days a week.    1430--Spoke with Makenna in dialysis and she stated that they are still following the patient's labs and at this time, there has not been any official plan put in place for OP dialysis and that she will continue to follow up with  staff as she gets any new information re: POC

## 2020-07-07 NOTE — PROGRESS NOTES
Hospital Medicine Daily Progress Note    Date of Service  7/7/2020    Chief Complaint  69 y.o. female admitted 6/30/2020 with junctional bradycardia rate 37 with cardiogenic shock, SOB.    Hospital Course    7/2:  This 68 yo female admitted by critical care to ICU for HR 37 junctional rhythm, COPD on 4 LPM NC at home, DM, hypothyroidism, hepatitis C, HTN, CHF, CKD stage 4 followed by Dr. Nava presented with worsening SOB, hypoxia and cough.  She was recently admitted 6/15/20 for COPD exacerbation and pulmonary edema, given amlodopine, metoprolol and lasix.   COVID 19 negative x3.  K was elevated 6.3.  Nephrology consulted and started HD with improvement of HR, BP and SOB.  3 HD sessions performed including today with 2 L removed.  She is feeling better, but still swollen in extremities, on 4 LPM NC baseline O2, ready for dc out of ICU.  Her BP is currently controlled at 97/69, no BP meds given at this point.  K normalized and Cr imporved.  Wbc increased to 23K on IV steroids.  I have started oral taper from solumedrol 60 IV q 12 to 40mg po daily.  It is unclear why patient was started on Rocephin on admission.  I have ordered a urine culture, no UA on admission or culture.  No evidence for pneumonia.  7/3:  Patient still with orthopnea, but no wheeze, wet cough noted today.  No HD today, waiting until tomorrow.  Has temporary triple lumen RIJ.  Wbc decreasing, Cr improving 3.24 to 2.58.  EF 80% but RVP elevated 55-60 with moderate mitral regurg noted. SR 76-92.  7/4:  Remains NSR on monitor.  No SOB, had 1.5 L removed during HD today.  Cr improving, urine output improving daily.  States having productive sputum thick brown, ordered sputum culture.  Lungs CTA b/l.  7/5:  No change, continues on baseline O2 4 LPM NC, no wheeze or crackles.  7/6:  C/o productive sputum, had 5 days of Rocephin, zithromax completed.  Ordered sputum culture, sent today with thick brown.  Wbc increased last 2 days, repeat CXR no  infiltrate, improved pulmonary edema since 6/30 CXR.  + VRE on urine culture, started zyvox bid x 7d ays.*    7/7: Patient reports making 250 mL urine output  Denies any dysuria  Plan for hemodialysis today  On Zyvox  She reports generalized weakness, will likely need placement, from assisted living facility    Consultants/Specialty  Dr. Pompa  Critical care    Code Status  DNAR, I ok    Disposition   getting HD per nephrology   Hep C +, no treatment yet, patient understands the benefits of treatment and will need to pursue outpatient tx.  Resides at [facility].  OT/PT no needs.  Baseline O2 5 LPM NC.    snf referral    Review of Systems  Review of Systems   Constitutional: Negative for chills, fever and malaise/fatigue.   HENT: Negative for congestion and sore throat.    Eyes: Negative for pain and discharge.   Respiratory: Positive for shortness of breath. Negative for cough, hemoptysis, sputum production and wheezing.    Cardiovascular: Positive for leg swelling. Negative for chest pain, palpitations and claudication.   Gastrointestinal: Negative for abdominal pain, constipation, diarrhea, melena, nausea and vomiting.   Genitourinary: Negative for dysuria, flank pain, frequency and urgency.   Musculoskeletal: Positive for myalgias. Negative for back pain, joint pain and neck pain.   Skin: Negative for itching and rash.   Neurological: Positive for weakness. Negative for dizziness, sensory change, speech change, focal weakness, loss of consciousness and headaches.   Endo/Heme/Allergies: Does not bruise/bleed easily.   Psychiatric/Behavioral: Negative for depression, memory loss, substance abuse and suicidal ideas.        Physical Exam  Temp:  [36.2 °C (97.1 °F)-36.6 °C (97.9 °F)] 36.6 °C (97.9 °F)  Pulse:  [63-78] 78  Resp:  [18-20] 20  BP: (107-147)/(68-87) 114/68  SpO2:  [89 %-98 %] 89 %    Physical Exam  Vitals signs and nursing note reviewed.   Constitutional:       General: She is not in acute distress.      Appearance: She is well-developed. She is not ill-appearing, toxic-appearing or diaphoretic.   HENT:      Head: Normocephalic and atraumatic.      Nose: Nose normal.      Mouth/Throat:      Pharynx: No oropharyngeal exudate.   Eyes:      Conjunctiva/sclera: Conjunctivae normal.      Pupils: Pupils are equal, round, and reactive to light.   Neck:      Musculoskeletal: Normal range of motion and neck supple.      Thyroid: No thyromegaly.      Vascular: No JVD.      Trachea: No tracheal deviation.   Cardiovascular:      Rate and Rhythm: Normal rate and regular rhythm.      Heart sounds: Normal heart sounds. No murmur.   Pulmonary:      Effort: Pulmonary effort is normal. No respiratory distress.      Breath sounds: Normal breath sounds. No stridor. No wheezing or rales.      Comments: cta b/l  Chest:      Chest wall: No tenderness.   Abdominal:      General: Bowel sounds are normal. There is no distension.      Palpations: Abdomen is soft. There is no mass.      Tenderness: There is no abdominal tenderness.   Musculoskeletal: Normal range of motion.         General: Swelling present. No tenderness.      Right lower leg: Edema present.      Left lower leg: Edema present.   Skin:     General: Skin is warm and dry.      Coloration: Skin is not pale.   Neurological:      Mental Status: She is alert and oriented to person, place, and time.      Cranial Nerves: No cranial nerve deficit.      Motor: No abnormal muscle tone.      Coordination: Coordination normal.   Psychiatric:         Behavior: Behavior normal.         Thought Content: Thought content normal.         Fluids    Intake/Output Summary (Last 24 hours) at 7/7/2020 1252  Last data filed at 7/7/2020 0311  Gross per 24 hour   Intake --   Output 600 ml   Net -600 ml       Laboratory  Recent Labs     07/05/20  0455 07/06/20  0341 07/07/20  0303   WBC 11.1* 15.2* 15.8*   RBC 2.38* 2.41* 2.33*   HEMOGLOBIN 7.1* 7.2* 7.0*   HEMATOCRIT 22.5* 22.8* 22.3*   MCV 94.5 94.6  95.7   MCH 29.8 29.9 30.0   MCHC 31.6* 31.6* 31.4*   RDW 51.2* 50.4* 51.1*   PLATELETCT 175 214 205   MPV 9.0 8.8* 8.8*     Recent Labs     07/05/20  0455 07/06/20  0341 07/07/20  0303   SODIUM 128* 128* 129*   POTASSIUM 3.7 4.1 4.6   CHLORIDE 91* 90* 92*   CO2 28 26 27   GLUCOSE 139* 177* 155*   BUN 32* 46* 56*   CREATININE 1.82* 2.22* 2.41*   CALCIUM 8.2* 8.3* 8.3*                   Imaging  DX-CHEST-PORTABLE (1 VIEW)   Final Result         No significant interval change.      Unchanged diffuse interstitial prominence.      EC-ECHOCARDIOGRAM COMPLETE W/O CONT   Final Result      US-RUQ   Final Result      Cholelithiasis with mild gallbladder wall thickening but no positive sonographic Troncoso sign. This could indicate chronic cholecystitis or incomplete distention      DX-CHEST-PORTABLE (1 VIEW)   Final Result      1.  Satisfactory position of new right IJV multilumen dialysis catheter. No pneumothorax identified.      2.  Unchanged mild volume overload pattern.      DX-CHEST-PORTABLE (1 VIEW)   Final Result      No acute cardiac or pulmonary abnormality is noted. Stable cardiomegaly with increased interstitial opacity diffusely.           Assessment/Plan  Hyperkalemia- (present on admission)  Assessment & Plan  Resolved with HD.    Acute renal failure superimposed on stage 4 chronic kidney disease (HCC)- (present on admission)  Assessment & Plan  Had recent admission, given lasix at discharge and new BP meds, metoprolol and norvasc.  HR 37 and low BP on admission.  Had HD emergently x 3 with improvement of SOB, 1 L off yesterday, 2 L off today.  Dr. Pompa following for HD ongoing determination, for now plans on TTS schedule.  Will need temporary HD catheter placed prior to dc and HD chair set up.  Patient states she gets better, then goes back to SOB for several months off and on.       7/7 HD per nephro, monitoring for renal recovery    Pneumonia due to infectious organism  Assessment & Plan  Had been treated  with 5 days rocephin, 3 days zithromax since admission.  CXR improved edema, no definite infiltrate to suggest pneumonia  C/o productive sputum, ordered sputum culture, hold on abx for now.    Leukocytosis- (present on admission)  Assessment & Plan  2/2 steroids.    Acute exacerbation of chronic obstructive pulmonary disease (COPD) (HCC)- (present on admission)  Assessment & Plan  Patient admitted with COPD exacerbation  7/2 tapering solumedrol IV to po prednisone 40 mg daily x 4 days.  7/4:  Sputum culture ordered for productive cough.    DM type 2, uncontrolled, with renal complications (HCC)- (present on admission)  Assessment & Plan  SSI  Diabetic, renal diet.    UTI (urinary tract infection) due to Enterococcus  Assessment & Plan  VRE on urine culture ss zyvox  Increasing wbc daily.  7/6:  Treat zyvox x 7 days.    7/7 ongoing wbc  F/u am labs    Renovascular hypertension  Assessment & Plan  Ordered PRN BP meds for sbp >160 or DBP >90 hydralazine.  Monitor  Avoid BB since she went into cardiogenic shock 2/2 bradycardia.    Chronic hepatitis C without hepatic coma (HCC)- (present on admission)  Assessment & Plan  Will need outpatient treatment.  Elevated LFTs.    Junctional bradycardia- (present on admission)  Assessment & Plan  Combination of hyperkalemia, acute on CKD4 and metoprolol, norvasc  Now resolved s/p HD.    Acute on chronic respiratory failure with hypoxia (HCC)- (present on admission)  Assessment & Plan  Initially admitted to ICU for respiratory support.  No intubation is noted in the record.    Anemia- (present on admission)  Assessment & Plan  2/2 CKD stage 4.    Pulmonary hypertension (HCC)- (present on admission)  Assessment & Plan  EF 80%  RVP elevated 55-60%.  Likely will need some diuretic for pulmonary edema.  Currently on HD with 2 L removed on 7/3.      Tobacco dependence- (present on admission)  Assessment & Plan  Recommend cessation    Chronic use of opiate drug for therapeutic purpose-  (present on admission)  Assessment & Plan  On oxycodone 10 mg tid at home, continued in hospital    EDITH (obstructive sleep apnea)- (present on admission)  Assessment & Plan  ongoing    Hypothyroidism- (present on admission)  Assessment & Plan  Continue home meds.       VTE prophylaxis: heparin

## 2020-07-07 NOTE — PROGRESS NOTES
Nephrology/Hemodialysis note    Patient with HAN/CKD III/hypervolemia/on HD  Seen and examined during dialysis treatment  Tolerates well, VS stable  Lab results reviewed  UF 3 L  Please see dialysis flow sheet for details

## 2020-07-07 NOTE — CARE PLAN
Problem: Safety  Goal: Will remain free from injury  Outcome: PROGRESSING AS EXPECTED  Note: Pt utilizes call light. Oriented.   Goal: Will remain free from falls  Outcome: PROGRESSING AS EXPECTED     Problem: Communication  Goal: The ability to communicate needs accurately and effectively will improve  Outcome: MET  Note: Pt communicates effectively.

## 2020-07-07 NOTE — CARE PLAN
Problem: Communication  Goal: The ability to communicate needs accurately and effectively will improve  Outcome: PROGRESSING AS EXPECTED     Problem: Safety  Goal: Will remain free from falls  Outcome: PROGRESSING AS EXPECTED  RN will provide patient education regarding fall risk  RN will provide patient education on the use of his call light  Patient will demonstrate how to use call light  Patient will verbalize appropriate times to use call light    Problem: Knowledge Deficit  Goal: Knowledge of the prescribed therapeutic regimen will improve  Outcome: PROGRESSING AS EXPECTED

## 2020-07-08 LAB
ABO GROUP BLD: ABNORMAL
ALBUMIN SERPL BCP-MCNC: 2.9 G/DL (ref 3.2–4.9)
ALBUMIN/GLOB SERPL: 1.4 G/DL
ALP SERPL-CCNC: 66 U/L (ref 30–99)
ALT SERPL-CCNC: 117 U/L (ref 2–50)
ANION GAP SERPL CALC-SCNC: 10 MMOL/L (ref 7–16)
AST SERPL-CCNC: 55 U/L (ref 12–45)
BARCODED ABORH UBTYP: 5100
BARCODED ABORH UBTYP: 9500
BARCODED PRD CODE UBPRD: ABNORMAL
BARCODED PRD CODE UBPRD: ABNORMAL
BARCODED UNIT NUM UBUNT: ABNORMAL
BARCODED UNIT NUM UBUNT: ABNORMAL
BASOPHILS # BLD AUTO: 0.1 % (ref 0–1.8)
BASOPHILS # BLD: 0.01 K/UL (ref 0–0.12)
BILIRUB SERPL-MCNC: 0.3 MG/DL (ref 0.1–1.5)
BLD GP AB SCN SERPL QL: ABNORMAL
BUN SERPL-MCNC: 30 MG/DL (ref 8–22)
CALCIUM SERPL-MCNC: 7.9 MG/DL (ref 8.5–10.5)
CHLORIDE SERPL-SCNC: 93 MMOL/L (ref 96–112)
CO2 SERPL-SCNC: 26 MMOL/L (ref 20–33)
COMPONENT R 8504R: ABNORMAL
COMPONENT R 8504R: ABNORMAL
CREAT SERPL-MCNC: 1.6 MG/DL (ref 0.5–1.4)
EOSINOPHIL # BLD AUTO: 0.31 K/UL (ref 0–0.51)
EOSINOPHIL NFR BLD: 2.7 % (ref 0–6.9)
ERYTHROCYTE [DISTWIDTH] IN BLOOD BY AUTOMATED COUNT: 53.5 FL (ref 35.9–50)
GAMMA INTERFERON BACKGROUND BLD IA-ACNC: 0.01 IU/ML
GLOBULIN SER CALC-MCNC: 2.1 G/DL (ref 1.9–3.5)
GLUCOSE BLD-MCNC: 104 MG/DL (ref 65–99)
GLUCOSE BLD-MCNC: 122 MG/DL (ref 65–99)
GLUCOSE BLD-MCNC: 168 MG/DL (ref 65–99)
GLUCOSE BLD-MCNC: 215 MG/DL (ref 65–99)
GLUCOSE SERPL-MCNC: 149 MG/DL (ref 65–99)
GRAM STN SPEC: NORMAL
HCT VFR BLD AUTO: 21.2 % (ref 37–47)
HGB BLD-MCNC: 6.6 G/DL (ref 12–16)
IMM GRANULOCYTES # BLD AUTO: 0.25 K/UL (ref 0–0.11)
IMM GRANULOCYTES NFR BLD AUTO: 2.2 % (ref 0–0.9)
LYMPHOCYTES # BLD AUTO: 1.45 K/UL (ref 1–4.8)
LYMPHOCYTES NFR BLD: 12.7 % (ref 22–41)
M TB IFN-G BLD-IMP: ABNORMAL
M TB IFN-G CD4+ BCKGRND COR BLD-ACNC: 0 IU/ML
MCH RBC QN AUTO: 30.4 PG (ref 27–33)
MCHC RBC AUTO-ENTMCNC: 31.1 G/DL (ref 33.6–35)
MCV RBC AUTO: 97.7 FL (ref 81.4–97.8)
MITOGEN IGNF BCKGRD COR BLD-ACNC: 0.04 IU/ML
MONOCYTES # BLD AUTO: 0.95 K/UL (ref 0–0.85)
MONOCYTES NFR BLD AUTO: 8.3 % (ref 0–13.4)
NEUTROPHILS # BLD AUTO: 8.44 K/UL (ref 2–7.15)
NEUTROPHILS NFR BLD: 74 % (ref 44–72)
NRBC # BLD AUTO: 0 K/UL
NRBC BLD-RTO: 0 /100 WBC
PLATELET # BLD AUTO: 203 K/UL (ref 164–446)
PMV BLD AUTO: 8.8 FL (ref 9–12.9)
POTASSIUM SERPL-SCNC: 4 MMOL/L (ref 3.6–5.5)
PRODUCT TYPE UPROD: ABNORMAL
PRODUCT TYPE UPROD: ABNORMAL
PROT SERPL-MCNC: 5 G/DL (ref 6–8.2)
QFT TB2 - NIL TBQ2: 0 IU/ML
RBC # BLD AUTO: 2.17 M/UL (ref 4.2–5.4)
RH BLD: ABNORMAL
SIGNIFICANT IND 70042: NORMAL
SITE SITE: NORMAL
SODIUM SERPL-SCNC: 129 MMOL/L (ref 135–145)
SOURCE SOURCE: NORMAL
UNIT STATUS USTAT: ABNORMAL
UNIT STATUS USTAT: ABNORMAL
WBC # BLD AUTO: 11.4 K/UL (ref 4.8–10.8)

## 2020-07-08 PROCEDURE — 36430 TRANSFUSION BLD/BLD COMPNT: CPT

## 2020-07-08 PROCEDURE — 700111 HCHG RX REV CODE 636 W/ 250 OVERRIDE (IP): Performed by: INTERNAL MEDICINE

## 2020-07-08 PROCEDURE — 82962 GLUCOSE BLOOD TEST: CPT | Mod: 91

## 2020-07-08 PROCEDURE — P9016 RBC LEUKOCYTES REDUCED: HCPCS | Mod: 91

## 2020-07-08 PROCEDURE — A9270 NON-COVERED ITEM OR SERVICE: HCPCS | Performed by: INTERNAL MEDICINE

## 2020-07-08 PROCEDURE — 90935 HEMODIALYSIS ONE EVALUATION: CPT

## 2020-07-08 PROCEDURE — 99233 SBSQ HOSP IP/OBS HIGH 50: CPT | Performed by: INTERNAL MEDICINE

## 2020-07-08 PROCEDURE — 86850 RBC ANTIBODY SCREEN: CPT

## 2020-07-08 PROCEDURE — 86900 BLOOD TYPING SEROLOGIC ABO: CPT

## 2020-07-08 PROCEDURE — 80053 COMPREHEN METABOLIC PANEL: CPT

## 2020-07-08 PROCEDURE — 700102 HCHG RX REV CODE 250 W/ 637 OVERRIDE(OP): Performed by: HOSPITALIST

## 2020-07-08 PROCEDURE — 85025 COMPLETE CBC W/AUTO DIFF WBC: CPT

## 2020-07-08 PROCEDURE — 86901 BLOOD TYPING SEROLOGIC RH(D): CPT

## 2020-07-08 PROCEDURE — 770020 HCHG ROOM/CARE - TELE (206)

## 2020-07-08 PROCEDURE — 87205 SMEAR GRAM STAIN: CPT

## 2020-07-08 PROCEDURE — 700102 HCHG RX REV CODE 250 W/ 637 OVERRIDE(OP): Performed by: INTERNAL MEDICINE

## 2020-07-08 PROCEDURE — 86923 COMPATIBILITY TEST ELECTRIC: CPT | Mod: 91

## 2020-07-08 PROCEDURE — A9270 NON-COVERED ITEM OR SERVICE: HCPCS | Performed by: HOSPITALIST

## 2020-07-08 PROCEDURE — 5A1D70Z PERFORMANCE OF URINARY FILTRATION, INTERMITTENT, LESS THAN 6 HOURS PER DAY: ICD-10-PCS | Performed by: INTERNAL MEDICINE

## 2020-07-08 RX ORDER — SODIUM CHLORIDE 450 MG/100ML
INJECTION, SOLUTION INTRAVENOUS
Status: DISCONTINUED
Start: 2020-07-08 | End: 2020-07-08

## 2020-07-08 RX ORDER — SODIUM CHLORIDE 9 MG/ML
INJECTION, SOLUTION INTRAVENOUS
Status: ACTIVE
Start: 2020-07-08 | End: 2020-07-08

## 2020-07-08 RX ORDER — HEPARIN SODIUM 1000 [USP'U]/ML
1500 INJECTION, SOLUTION INTRAVENOUS; SUBCUTANEOUS
Status: DISCONTINUED | OUTPATIENT
Start: 2020-07-08 | End: 2020-07-17

## 2020-07-08 RX ADMIN — INSULIN HUMAN 1 UNITS: 100 INJECTION, SOLUTION PARENTERAL at 21:07

## 2020-07-08 RX ADMIN — INSULIN HUMAN 2 UNITS: 100 INJECTION, SOLUTION PARENTERAL at 11:17

## 2020-07-08 RX ADMIN — ROSUVASTATIN CALCIUM 10 MG: 20 TABLET, FILM COATED ORAL at 18:06

## 2020-07-08 RX ADMIN — TRAZODONE HYDROCHLORIDE 300 MG: 150 TABLET ORAL at 20:56

## 2020-07-08 RX ADMIN — OXYCODONE HYDROCHLORIDE 10 MG: 10 TABLET ORAL at 02:10

## 2020-07-08 RX ADMIN — HEPARIN SODIUM 2400 UNITS: 1000 INJECTION, SOLUTION INTRAVENOUS; SUBCUTANEOUS at 16:48

## 2020-07-08 RX ADMIN — OXYCODONE HYDROCHLORIDE 10 MG: 10 TABLET ORAL at 10:28

## 2020-07-08 RX ADMIN — LEVOTHYROXINE SODIUM 75 MCG: 75 TABLET ORAL at 05:53

## 2020-07-08 RX ADMIN — LINEZOLID 600 MG: 600 TABLET, FILM COATED ORAL at 18:06

## 2020-07-08 RX ADMIN — DULOXETINE HYDROCHLORIDE 60 MG: 60 CAPSULE, DELAYED RELEASE ORAL at 05:53

## 2020-07-08 RX ADMIN — TRAZODONE HYDROCHLORIDE 300 MG: 150 TABLET ORAL at 05:53

## 2020-07-08 RX ADMIN — FLUTICASONE PROPIONATE 88 MCG: 44 AEROSOL, METERED RESPIRATORY (INHALATION) at 06:00

## 2020-07-08 RX ADMIN — LINEZOLID 600 MG: 600 TABLET, FILM COATED ORAL at 05:53

## 2020-07-08 RX ADMIN — OXYCODONE HYDROCHLORIDE 10 MG: 10 TABLET ORAL at 20:55

## 2020-07-08 RX ADMIN — HEPARIN SODIUM 1500 UNITS: 1000 INJECTION, SOLUTION INTRAVENOUS; SUBCUTANEOUS at 14:30

## 2020-07-08 ASSESSMENT — ENCOUNTER SYMPTOMS
FLANK PAIN: 0
CONSTIPATION: 0
FOCAL WEAKNESS: 0
SHORTNESS OF BREATH: 1
LOSS OF CONSCIOUSNESS: 0
DIARRHEA: 0
VOMITING: 0
NECK PAIN: 0
ABDOMINAL PAIN: 0
MYALGIAS: 1
BACK PAIN: 0
DIAPHORESIS: 0
CHILLS: 0
FEVER: 0
NAUSEA: 0
SENSORY CHANGE: 0
WHEEZING: 0
CLAUDICATION: 0
WEAKNESS: 1
MEMORY LOSS: 0
HEADACHES: 0
SORE THROAT: 0
PALPITATIONS: 0
SPEECH CHANGE: 0
COUGH: 0
DIZZINESS: 0

## 2020-07-08 ASSESSMENT — LIFESTYLE VARIABLES: SUBSTANCE_ABUSE: 0

## 2020-07-08 ASSESSMENT — FIBROSIS 4 INDEX: FIB4 SCORE: 1.75

## 2020-07-08 NOTE — CARE PLAN
Problem: Safety  Goal: Will remain free from falls  Outcome: PROGRESSING AS EXPECTED   Patient educated to use call light for assistance. Fall precautions in place. Staff will assist with mobilization. Hourly rounding in place.   Problem: Infection  Goal: Will remain free from infection  Outcome: PROGRESSING AS EXPECTED   Appropriate protocols and standards utilized to minimize likelihood of infection and the spread of infection. Proper hand hygiene utilized and pt and family members educated on hand hygiene.    Problem: Knowledge Deficit  Goal: Knowledge of disease process/condition, treatment plan, diagnostic tests, and medications will improve  Outcome: PROGRESSING AS EXPECTED   Pt educated on POC for the day, disease process, upcoming tests, and medication information. Will continue to answer questions and educate pt as necessary.

## 2020-07-08 NOTE — PROGRESS NOTES
Nephrology/PUF note  Patient with HAN/HD with hypervolemia  Seen and examined during PUF  Goal 2-3 L  Tolerates well  VS stable  Please see dialysis flow sheet for details

## 2020-07-08 NOTE — PROGRESS NOTES
Hospital Medicine Daily Progress Note    Date of Service  7/8/2020    Chief Complaint  69 y.o. female admitted 6/30/2020 with junctional bradycardia rate 37 with cardiogenic shock, SOB.    Hospital Course    7/2:  This 68 yo female admitted by critical care to ICU for HR 37 junctional rhythm, COPD on 4 LPM NC at home, DM, hypothyroidism, hepatitis C, HTN, CHF, CKD stage 4 followed by Dr. Nava presented with worsening SOB, hypoxia and cough.  She was recently admitted 6/15/20 for COPD exacerbation and pulmonary edema, given amlodopine, metoprolol and lasix.   COVID 19 negative x3.  K was elevated 6.3.  Nephrology consulted and started HD with improvement of HR, BP and SOB.  3 HD sessions performed including today with 2 L removed.  She is feeling better, but still swollen in extremities, on 4 LPM NC baseline O2, ready for dc out of ICU.  Her BP is currently controlled at 97/69, no BP meds given at this point.  K normalized and Cr imporved.  Wbc increased to 23K on IV steroids.  I have started oral taper from solumedrol 60 IV q 12 to 40mg po daily.  It is unclear why patient was started on Rocephin on admission.  I have ordered a urine culture, no UA on admission or culture.  No evidence for pneumonia.  7/3:  Patient still with orthopnea, but no wheeze, wet cough noted today.  No HD today, waiting until tomorrow.  Has temporary triple lumen RIJ.  Wbc decreasing, Cr improving 3.24 to 2.58.  EF 80% but RVP elevated 55-60 with moderate mitral regurg noted. SR 76-92.  7/4:  Remains NSR on monitor.  No SOB, had 1.5 L removed during HD today.  Cr improving, urine output improving daily.  States having productive sputum thick brown, ordered sputum culture.  Lungs CTA b/l.  7/5:  No change, continues on baseline O2 4 LPM NC, no wheeze or crackles.  7/6:  C/o productive sputum, had 5 days of Rocephin, zithromax completed.  Ordered sputum culture, sent today with thick brown.  Wbc increased last 2 days, repeat CXR no  infiltrate, improved pulmonary edema since 6/30 CXR.  + VRE on urine culture, started zyvox bid x 7d ays.*    7/7: Patient reports making 250 mL urine output  Denies any dysuria  Plan for hemodialysis today  On Zyvox  She reports generalized weakness, will likely need placement, from assisted living facility    7/8 we will monitor ins and outs closely  Discussed with nephrology, Dr. Mccabe  Denies dysuria  Generalized weakness, requiring 1 person assist  Encourage activity  Discussed with RN  Baseline oxygen needs of 4 L, will taper as tolerated  Anemia requiring transfusion, will monitor closely  Reports lethargy today    Consultants/Specialty  Dr. Pompa  Critical care    Code Status  DNAR, I ok    Disposition   getting HD per nephrology   Hep C +, no treatment yet, patient understands the benefits of treatment and will need to pursue outpatient tx.  Resides at [facility].  OT/PT no needs.  Baseline O2 5 LPM NC.    snf referral vs [facility] with home health, needs transport assistance to hemodialysis center 3 times a week   to assist  Monitor strict ins and outs  Taper oxygen  Out of bed twice daily  Zyvox through July 13    Review of Systems  Review of Systems   Constitutional: Negative for chills, diaphoresis, fever and malaise/fatigue.   HENT: Negative for congestion and sore throat.    Respiratory: Positive for shortness of breath. Negative for cough and wheezing.    Cardiovascular: Positive for leg swelling. Negative for chest pain, palpitations and claudication.   Gastrointestinal: Negative for abdominal pain, constipation, diarrhea, melena, nausea and vomiting.   Genitourinary: Negative for dysuria, flank pain, frequency and urgency.   Musculoskeletal: Positive for myalgias. Negative for back pain and neck pain.   Neurological: Positive for weakness. Negative for dizziness, sensory change, speech change, focal weakness, loss of consciousness and headaches.   Psychiatric/Behavioral: Negative for memory loss,  substance abuse and suicidal ideas.        Physical Exam  Temp:  [36.5 °C (97.7 °F)-36.9 °C (98.4 °F)] 36.6 °C (97.8 °F)  Pulse:  [62-69] 64  Resp:  [17-20] 17  BP: (124-158)/(41-61) 124/41  SpO2:  [95 %-100 %] 99 %    Physical Exam  Vitals signs and nursing note reviewed.   Constitutional:       General: She is not in acute distress.     Appearance: She is well-developed. She is not ill-appearing or diaphoretic.   HENT:      Head: Normocephalic and atraumatic.      Nose: Nose normal.      Mouth/Throat:      Pharynx: No oropharyngeal exudate.   Eyes:      Extraocular Movements: Extraocular movements intact.      Pupils: Pupils are equal, round, and reactive to light.   Neck:      Musculoskeletal: Normal range of motion and neck supple.      Thyroid: No thyromegaly.      Vascular: No JVD.      Trachea: No tracheal deviation.   Cardiovascular:      Rate and Rhythm: Normal rate and regular rhythm.      Pulses: Normal pulses.      Heart sounds: Normal heart sounds. No murmur.   Pulmonary:      Effort: Pulmonary effort is normal. No respiratory distress.      Breath sounds: Normal breath sounds. No stridor. No wheezing.      Comments: cta b/l  Abdominal:      General: Bowel sounds are normal. There is no distension.      Palpations: Abdomen is soft.      Tenderness: There is no abdominal tenderness.   Musculoskeletal: Normal range of motion.         General: Swelling present. No tenderness.      Right lower leg: Edema present.      Left lower leg: Edema present.   Skin:     General: Skin is warm and dry.      Coloration: Skin is not pale.      Findings: No erythema.   Neurological:      Mental Status: She is alert and oriented to person, place, and time.      Cranial Nerves: No cranial nerve deficit.      Sensory: No sensory deficit.      Motor: Weakness present. No abnormal muscle tone.      Coordination: Coordination normal.   Psychiatric:         Behavior: Behavior normal.         Thought Content: Thought content  normal.         Fluids    Intake/Output Summary (Last 24 hours) at 7/8/2020 1117  Last data filed at 7/8/2020 0800  Gross per 24 hour   Intake 800 ml   Output 4000 ml   Net -3200 ml       Laboratory  Recent Labs     07/06/20 0341 07/07/20  0303 07/08/20  0220   WBC 15.2* 15.8* 11.4*   RBC 2.41* 2.33* 2.17*   HEMOGLOBIN 7.2* 7.0* 6.6*   HEMATOCRIT 22.8* 22.3* 21.2*   MCV 94.6 95.7 97.7   MCH 29.9 30.0 30.4   MCHC 31.6* 31.4* 31.1*   RDW 50.4* 51.1* 53.5*   PLATELETCT 214 205 203   MPV 8.8* 8.8* 8.8*     Recent Labs     07/06/20 0341 07/07/20  0303 07/08/20  0220   SODIUM 128* 129* 129*   POTASSIUM 4.1 4.6 4.0   CHLORIDE 90* 92* 93*   CO2 26 27 26   GLUCOSE 177* 155* 149*   BUN 46* 56* 30*   CREATININE 2.22* 2.41* 1.60*   CALCIUM 8.3* 8.3* 7.9*                   Imaging  DX-CHEST-PORTABLE (1 VIEW)   Final Result         No significant interval change.      Unchanged diffuse interstitial prominence.      EC-ECHOCARDIOGRAM COMPLETE W/O CONT   Final Result      US-RUQ   Final Result      Cholelithiasis with mild gallbladder wall thickening but no positive sonographic Tron[place]o sign. This could indicate chronic cholecystitis or incomplete distention      DX-CHEST-PORTABLE (1 VIEW)   Final Result      1.  Satisfactory position of new right IJV multilumen dialysis catheter. No pneumothorax identified.      2.  Unchanged mild volume overload pattern.      DX-CHEST-PORTABLE (1 VIEW)   Final Result      No acute cardiac or pulmonary abnormality is noted. Stable cardiomegaly with increased interstitial opacity diffusely.           Assessment/Plan  Hyperkalemia- (present on admission)  Assessment & Plan  Resolved with HD.    Acute renal failure superimposed on stage 4 chronic kidney disease (HCC)- (present on admission)  Assessment & Plan  Had recent admission, given lasix at discharge and new BP meds, metoprolol and norvasc.  HR 37 and low BP on admission.  Had HD emergently x 3 with improvement of SOB, 1 L off yesterday, 2 L  off today.  Dr. Pompa following for HD ongoing determination, for now plans on TTS schedule.  Will need temporary HD catheter placed prior to dc and HD chair set up.  Patient states she gets better, then goes back to SOB for several months off and on.       7/7 HD per nephro, monitoring for renal recovery  7/8  S/p HD, ?outpatient HD, nephro to arrange  Need to arrange ride to HD 3x/weekly from care home vs snf  CM to assist  - encourage activity, OOB bid  On 5L O2    Pneumonia due to infectious organism  Assessment & Plan  Had been treated with 5 days rocephin, 3 days zithromax since admission.  CXR improved edema, no definite infiltrate to suggest pneumonia  C/o productive sputum, ordered sputum culture, hold on abx for now.    Leukocytosis- (present on admission)  Assessment & Plan  2/2 steroids.    Acute exacerbation of chronic obstructive pulmonary disease (COPD) (HCC)- (present on admission)  Assessment & Plan  Patient admitted with COPD exacerbation  7/2 tapering solumedrol IV to po prednisone 40 mg daily x 4 days.  7/4:  Sputum culture ordered for productive cough.    DM type 2, uncontrolled, with renal complications (HCC)- (present on admission)  Assessment & Plan  SSI  Diabetic, renal diet.    UTI (urinary tract infection) due to Enterococcus  Assessment & Plan  VRE on urine culture ss zyvox  Increasing wbc daily.  7/6:  Treat zyvox x 7 days.    7/7 ongoing wbc  F/u am labs    7/8 improving wbc  Cont zyvox 7/13    Renovascular hypertension  Assessment & Plan  Ordered PRN BP meds for sbp >160 or DBP >90 hydralazine.  Monitor  Avoid BB since she went into cardiogenic shock 2/2 bradycardia.    Chronic hepatitis C without hepatic coma (HCC)- (present on admission)  Assessment & Plan  Will need outpatient treatment.  Elevated LFTs.    Junctional bradycardia- (present on admission)  Assessment & Plan  Combination of hyperkalemia, acute on CKD4 and metoprolol, norvasc  Now resolved s/p HD.    Acute on chronic  respiratory failure with hypoxia (HCC)- (present on admission)  Assessment & Plan  Initially admitted to ICU for respiratory support.  No intubation is noted in the record.    Anemia- (present on admission)  Assessment & Plan  2/2 CKD stage 4.    7/8 hgb 6.6, plan for transfusion today  - f/u am cbc, monitor    Pulmonary hypertension (HCC)- (present on admission)  Assessment & Plan  EF 80%  RVP elevated 55-60%.  Likely will need some diuretic for pulmonary edema.  Currently on HD with 2 L removed on 7/3.      Tobacco dependence- (present on admission)  Assessment & Plan  Recommend cessation    Chronic use of opiate drug for therapeutic purpose- (present on admission)  Assessment & Plan  On oxycodone 10 mg tid at home, continued in hospital    EDITH (obstructive sleep apnea)- (present on admission)  Assessment & Plan  ongoing    Hypothyroidism- (present on admission)  Assessment & Plan  Continue home meds.       VTE prophylaxis: heparin

## 2020-07-08 NOTE — CARE PLAN
Problem: Safety  Goal: Will remain free from injury  Outcome: PROGRESSING AS EXPECTED   Patient's risk for injury and falls assessed. Appropriate safety precautions in place. Patient educated to utilize call light for needs. Patient verbalizes understanding.     Problem: Infection  Goal: Will remain free from infection  Outcome: PROGRESSING AS EXPECTED   Patient WBC within normal limits. No open wounds noted on patient. Patient does not complain of SOB. Patient vital signs are stable.

## 2020-07-08 NOTE — PROGRESS NOTES
Received Bedside report. Assumed care at 0700. This pt is AOx4, ambulatory with FWW, voiding adequately, reports no pain. Patient and RN discussed plan of care: questions answered. Labs noted, assessment complete, patient tolerating cardiac DM diet. Tele box in place. Pt is on 5L of O2 via NC. Call light in place, fall precautions in place, patient educated on importance of calling for assistance. No additional needs at this time. VSS

## 2020-07-08 NOTE — PROGRESS NOTES
Patient desaturated to 86% during dialysis and complaining of SOB. Patient placed on a mask, then non rebreather for a few minutes. Patient now saturating at 95% on 7L via mask. No complaints of SOB. Dr. Hemphill updated.

## 2020-07-08 NOTE — PROGRESS NOTES
[facility] Progress Note     PUF treatment ordered today per Dr. Bansal x 2 hours.   Treatment initiated at 1449, ended at 1649.       At 1 hour 10 mins into treatment, pt experience sudden difficulty of breathing, 89% O2 sat., sudden drop in blood pressure, and increased heart rate (95), stopped the fluid pull, notified primary RN, and responded to the patient. After 10 mins., pt stabilized, re-start fluid pull, adjusted UF goal to 2L vs. 3L ordered.     See paper flow sheet for details.      Net UF 2,000 mL.      Post tx, CVC flushed with saline then locked with heparin 1000 units/mL per designated amount in each wing then clamped and capped.   Aspirate heparin prior to next CVC use.     Report given to Primary RN.

## 2020-07-08 NOTE — DISCHARGE PLANNING
Anticipated Disposition:  [facility] with [facility]     Action:   Per chart review, Pt is currently on service with [facility] GANDHI, met Pt in her room, she would like to continue to use [facility] GANDHI to provide service.     Choice form filled and faxed to [facility].           Barriers to Discharge:   Medical clearance  Possible lack of transportation for Dialysis       Plan:  Please follow up with attending physician for medical clearance.   Please follow up with Nephology physician to determine whether dialysis is needed.

## 2020-07-09 LAB
ALBUMIN SERPL BCP-MCNC: 3.2 G/DL (ref 3.2–4.9)
ALBUMIN/GLOB SERPL: 1.5 G/DL
ALP SERPL-CCNC: 65 U/L (ref 30–99)
ALT SERPL-CCNC: 93 U/L (ref 2–50)
ANION GAP SERPL CALC-SCNC: 11 MMOL/L (ref 7–16)
AST SERPL-CCNC: 33 U/L (ref 12–45)
BASOPHILS # BLD AUTO: 0.3 % (ref 0–1.8)
BASOPHILS # BLD: 0.03 K/UL (ref 0–0.12)
BILIRUB SERPL-MCNC: 0.5 MG/DL (ref 0.1–1.5)
BUN SERPL-MCNC: 37 MG/DL (ref 8–22)
CALCIUM SERPL-MCNC: 8.3 MG/DL (ref 8.5–10.5)
CHLORIDE SERPL-SCNC: 93 MMOL/L (ref 96–112)
CO2 SERPL-SCNC: 26 MMOL/L (ref 20–33)
CREAT SERPL-MCNC: 2.07 MG/DL (ref 0.5–1.4)
EOSINOPHIL # BLD AUTO: 0.31 K/UL (ref 0–0.51)
EOSINOPHIL NFR BLD: 3 % (ref 0–6.9)
ERYTHROCYTE [DISTWIDTH] IN BLOOD BY AUTOMATED COUNT: 50.6 FL (ref 35.9–50)
GLOBULIN SER CALC-MCNC: 2.1 G/DL (ref 1.9–3.5)
GLUCOSE BLD-MCNC: 115 MG/DL (ref 65–99)
GLUCOSE BLD-MCNC: 132 MG/DL (ref 65–99)
GLUCOSE BLD-MCNC: 149 MG/DL (ref 65–99)
GLUCOSE SERPL-MCNC: 144 MG/DL (ref 65–99)
HCT VFR BLD AUTO: 28.4 % (ref 37–47)
HEMOCCULT STL QL: NEGATIVE
HGB BLD-MCNC: 9.1 G/DL (ref 12–16)
IMM GRANULOCYTES # BLD AUTO: 0.18 K/UL (ref 0–0.11)
IMM GRANULOCYTES NFR BLD AUTO: 1.7 % (ref 0–0.9)
LYMPHOCYTES # BLD AUTO: 1.03 K/UL (ref 1–4.8)
LYMPHOCYTES NFR BLD: 9.9 % (ref 22–41)
MCH RBC QN AUTO: 30.6 PG (ref 27–33)
MCHC RBC AUTO-ENTMCNC: 32 G/DL (ref 33.6–35)
MCV RBC AUTO: 95.6 FL (ref 81.4–97.8)
MONOCYTES # BLD AUTO: 0.94 K/UL (ref 0–0.85)
MONOCYTES NFR BLD AUTO: 9.1 % (ref 0–13.4)
NEUTROPHILS # BLD AUTO: 7.87 K/UL (ref 2–7.15)
NEUTROPHILS NFR BLD: 76 % (ref 44–72)
NRBC # BLD AUTO: 0 K/UL
NRBC BLD-RTO: 0 /100 WBC
NT-PROBNP SERPL IA-MCNC: 2934 PG/ML (ref 0–125)
PHOSPHATE SERPL-MCNC: 4.1 MG/DL (ref 2.5–4.5)
PLATELET # BLD AUTO: 188 K/UL (ref 164–446)
PMV BLD AUTO: 8.4 FL (ref 9–12.9)
POTASSIUM SERPL-SCNC: 4.5 MMOL/L (ref 3.6–5.5)
PROT SERPL-MCNC: 5.3 G/DL (ref 6–8.2)
RBC # BLD AUTO: 2.97 M/UL (ref 4.2–5.4)
SODIUM SERPL-SCNC: 130 MMOL/L (ref 135–145)
WBC # BLD AUTO: 10.4 K/UL (ref 4.8–10.8)

## 2020-07-09 PROCEDURE — 82962 GLUCOSE BLOOD TEST: CPT

## 2020-07-09 PROCEDURE — 82272 OCCULT BLD FECES 1-3 TESTS: CPT

## 2020-07-09 PROCEDURE — A9270 NON-COVERED ITEM OR SERVICE: HCPCS | Performed by: HOSPITALIST

## 2020-07-09 PROCEDURE — 770020 HCHG ROOM/CARE - TELE (206)

## 2020-07-09 PROCEDURE — 80053 COMPREHEN METABOLIC PANEL: CPT

## 2020-07-09 PROCEDURE — 99233 SBSQ HOSP IP/OBS HIGH 50: CPT | Performed by: INTERNAL MEDICINE

## 2020-07-09 PROCEDURE — A9270 NON-COVERED ITEM OR SERVICE: HCPCS | Performed by: INTERNAL MEDICINE

## 2020-07-09 PROCEDURE — 99232 SBSQ HOSP IP/OBS MODERATE 35: CPT | Performed by: INTERNAL MEDICINE

## 2020-07-09 PROCEDURE — 94760 N-INVAS EAR/PLS OXIMETRY 1: CPT

## 2020-07-09 PROCEDURE — 83880 ASSAY OF NATRIURETIC PEPTIDE: CPT

## 2020-07-09 PROCEDURE — 700102 HCHG RX REV CODE 250 W/ 637 OVERRIDE(OP): Performed by: INTERNAL MEDICINE

## 2020-07-09 PROCEDURE — 700102 HCHG RX REV CODE 250 W/ 637 OVERRIDE(OP): Performed by: HOSPITALIST

## 2020-07-09 PROCEDURE — 85025 COMPLETE CBC W/AUTO DIFF WBC: CPT

## 2020-07-09 PROCEDURE — 84100 ASSAY OF PHOSPHORUS: CPT

## 2020-07-09 PROCEDURE — 94640 AIRWAY INHALATION TREATMENT: CPT

## 2020-07-09 RX ORDER — AMLODIPINE BESYLATE 5 MG/1
5 TABLET ORAL
Status: DISCONTINUED | OUTPATIENT
Start: 2020-07-09 | End: 2020-07-22 | Stop reason: HOSPADM

## 2020-07-09 RX ADMIN — LINEZOLID 600 MG: 600 TABLET, FILM COATED ORAL at 04:56

## 2020-07-09 RX ADMIN — OXYCODONE HYDROCHLORIDE 10 MG: 10 TABLET ORAL at 17:14

## 2020-07-09 RX ADMIN — TRAZODONE HYDROCHLORIDE 300 MG: 150 TABLET ORAL at 21:08

## 2020-07-09 RX ADMIN — LINEZOLID 600 MG: 600 TABLET, FILM COATED ORAL at 17:14

## 2020-07-09 RX ADMIN — DULOXETINE HYDROCHLORIDE 60 MG: 60 CAPSULE, DELAYED RELEASE ORAL at 04:56

## 2020-07-09 RX ADMIN — AMLODIPINE BESYLATE 5 MG: 5 TABLET ORAL at 10:59

## 2020-07-09 RX ADMIN — ROSUVASTATIN CALCIUM 10 MG: 20 TABLET, FILM COATED ORAL at 17:14

## 2020-07-09 RX ADMIN — TRAZODONE HYDROCHLORIDE 300 MG: 150 TABLET ORAL at 04:56

## 2020-07-09 RX ADMIN — FLUTICASONE PROPIONATE 88 MCG: 44 AEROSOL, METERED RESPIRATORY (INHALATION) at 06:00

## 2020-07-09 RX ADMIN — HYDRALAZINE HYDROCHLORIDE 25 MG: 25 TABLET, FILM COATED ORAL at 08:06

## 2020-07-09 RX ADMIN — LEVOTHYROXINE SODIUM 75 MCG: 75 TABLET ORAL at 04:57

## 2020-07-09 RX ADMIN — UMECLIDINIUM BROMIDE AND VILANTEROL TRIFENATATE 1 PUFF: 62.5; 25 POWDER RESPIRATORY (INHALATION) at 06:26

## 2020-07-09 RX ADMIN — INSULIN HUMAN 1 UNITS: 100 INJECTION, SOLUTION PARENTERAL at 21:12

## 2020-07-09 RX ADMIN — OXYCODONE HYDROCHLORIDE 10 MG: 10 TABLET ORAL at 08:06

## 2020-07-09 ASSESSMENT — ENCOUNTER SYMPTOMS
FOCAL WEAKNESS: 0
WHEEZING: 0
VOMITING: 0
ORTHOPNEA: 0
ABDOMINAL PAIN: 0
PALPITATIONS: 0
SINUS PAIN: 0
CLAUDICATION: 0
HEMOPTYSIS: 0
DIZZINESS: 0
SHORTNESS OF BREATH: 0
EYES NEGATIVE: 1
MYALGIAS: 1
WEIGHT LOSS: 0
FEVER: 0
CHILLS: 0
FLANK PAIN: 0
NECK PAIN: 0
COUGH: 0
LOSS OF CONSCIOUSNESS: 0
WEAKNESS: 1
HEADACHES: 0
BACK PAIN: 0
MEMORY LOSS: 0
NAUSEA: 0
SORE THROAT: 0
SHORTNESS OF BREATH: 1

## 2020-07-09 ASSESSMENT — LIFESTYLE VARIABLES: SUBSTANCE_ABUSE: 0

## 2020-07-09 ASSESSMENT — FIBROSIS 4 INDEX: FIB4 SCORE: 1.27

## 2020-07-09 NOTE — PROGRESS NOTES
Hospital Medicine Daily Progress Note    Date of Service  7/9/2020    Chief Complaint  69 y.o. female admitted 6/30/2020 with junctional bradycardia rate 37 with cardiogenic shock, SOB.    Hospital Course    7/2:  This 70 yo female admitted by critical care to ICU for HR 37 junctional rhythm, COPD on 4 LPM NC at home, DM, hypothyroidism, hepatitis C, HTN, CHF, CKD stage 4 followed by Dr. Nava presented with worsening SOB, hypoxia and cough.  She was recently admitted 6/15/20 for COPD exacerbation and pulmonary edema, given amlodopine, metoprolol and lasix.   COVID 19 negative x3.  K was elevated 6.3.  Nephrology consulted and started HD with improvement of HR, BP and SOB.  3 HD sessions performed including today with 2 L removed.  She is feeling better, but still swollen in extremities, on 4 LPM NC baseline O2, ready for dc out of ICU.  Her BP is currently controlled at 97/69, no BP meds given at this point.  K normalized and Cr imporved.  Wbc increased to 23K on IV steroids.  I have started oral taper from solumedrol 60 IV q 12 to 40mg po daily.  It is unclear why patient was started on Rocephin on admission.  I have ordered a urine culture, no UA on admission or culture.  No evidence for pneumonia.  7/3:  Patient still with orthopnea, but no wheeze, wet cough noted today.  No HD today, waiting until tomorrow.  Has temporary triple lumen RIJ.  Wbc decreasing, Cr improving 3.24 to 2.58.  EF 80% but RVP elevated 55-60 with moderate mitral regurg noted. SR 76-92.  7/4:  Remains NSR on monitor.  No SOB, had 1.5 L removed during HD today.  Cr improving, urine output improving daily.  States having productive sputum thick brown, ordered sputum culture.  Lungs CTA b/l.  7/5:  No change, continues on baseline O2 4 LPM NC, no wheeze or crackles.  7/6:  C/o productive sputum, had 5 days of Rocephin, zithromax completed.  Ordered sputum culture, sent today with thick brown.  Wbc increased last 2 days, repeat CXR no  infiltrate, improved pulmonary edema since 6/30 CXR.  + VRE on urine culture, started zyvox bid x 7d ays.*    7/7: Patient reports making 250 mL urine output  Denies any dysuria  Plan for hemodialysis today  On Zyvox  She reports generalized weakness, will likely need placement, from assisted living facility    7/8 we will monitor ins and outs closely  Discussed with nephrology, Dr. Mccabe  Denies dysuria  Generalized weakness, requiring 1 person assist  Encourage activity  Discussed with RN  Baseline oxygen needs of 4 L, will taper as tolerated  Anemia requiring transfusion, will monitor closely  Reports lethargy today    7/9 no new complaints  Patient reports urine output  Per nephrology, will start trial of Lasix  Hemodialysis per nephrology    Consultants/Specialty  Dr. Pompa  Critical care    Code Status  DNAR, I ok    Disposition   getting HD per nephrology   Hep C +, no treatment yet, patient understands the benefits of treatment and will need to pursue outpatient tx.  Resides at [facility].  OT/PT no needs.  Baseline O2 5 LPM NC.    snf referral vs [facility] with home health, needs transport assistance to hemodialysis center 3 times a week   to assist  Monitor strict ins and outs  Taper oxygen  Out of bed twice daily  Zyvox through July 13    Review of Systems  Review of Systems   Constitutional: Negative for chills, fever and malaise/fatigue.   HENT: Negative for congestion and sore throat.    Respiratory: Positive for shortness of breath. Negative for wheezing.    Cardiovascular: Positive for leg swelling. Negative for chest pain, palpitations and claudication.   Gastrointestinal: Negative for abdominal pain, melena, nausea and vomiting.   Genitourinary: Negative for dysuria, flank pain, frequency and urgency.   Musculoskeletal: Positive for myalgias. Negative for back pain and neck pain.   Neurological: Positive for weakness. Negative for dizziness, focal weakness, loss of consciousness and headaches.    Psychiatric/Behavioral: Negative for memory loss and substance abuse.        Physical Exam  Temp:  [35.8 °C (96.5 °F)-36.4 °C (97.6 °F)] 36.3 °C (97.3 °F)  Pulse:  [62-72] 62  Resp:  [16-18] 16  BP: (113-178)/(29-63) 146/54  SpO2:  [86 %-98 %] 94 %    Physical Exam  Vitals signs and nursing note reviewed.   Constitutional:       General: She is not in acute distress.     Appearance: She is well-developed. She is not ill-appearing.   HENT:      Head: Normocephalic and atraumatic.      Nose: Nose normal.      Mouth/Throat:      Pharynx: No oropharyngeal exudate.   Eyes:      Extraocular Movements: Extraocular movements intact.      Pupils: Pupils are equal, round, and reactive to light.   Neck:      Musculoskeletal: Normal range of motion and neck supple.      Thyroid: No thyromegaly.      Vascular: No JVD.      Trachea: No tracheal deviation.   Cardiovascular:      Rate and Rhythm: Normal rate and regular rhythm.      Pulses: Normal pulses.   Pulmonary:      Effort: Pulmonary effort is normal. No respiratory distress.      Breath sounds: Normal breath sounds. No stridor. No wheezing.      Comments: cta b/l  Abdominal:      General: Bowel sounds are normal.      Palpations: Abdomen is soft.      Tenderness: There is no abdominal tenderness.   Musculoskeletal: Normal range of motion.         General: Swelling present. No tenderness.      Right lower leg: Edema present.      Left lower leg: Edema present.   Skin:     General: Skin is warm and dry.      Coloration: Skin is not pale.   Neurological:      Mental Status: She is alert and oriented to person, place, and time.      Cranial Nerves: No cranial nerve deficit.      Motor: Weakness present. No abnormal muscle tone.   Psychiatric:         Behavior: Behavior normal.         Thought Content: Thought content normal.         Fluids    Intake/Output Summary (Last 24 hours) at 7/9/2020 1417  Last data filed at 7/9/2020 1100  Gross per 24 hour   Intake 900 ml   Output  4050 ml   Net -3150 ml       Laboratory  Recent Labs     07/07/20  0303 07/08/20  0220 07/09/20  0504   WBC 15.8* 11.4* 10.4   RBC 2.33* 2.17* 2.97*   HEMOGLOBIN 7.0* 6.6* 9.1*   HEMATOCRIT 22.3* 21.2* 28.4*   MCV 95.7 97.7 95.6   MCH 30.0 30.4 30.6   MCHC 31.4* 31.1* 32.0*   RDW 51.1* 53.5* 50.6*   PLATELETCT 205 203 188   MPV 8.8* 8.8* 8.4*     Recent Labs     07/07/20  0303 07/08/20  0220 07/09/20  0504   SODIUM 129* 129* 130*   POTASSIUM 4.6 4.0 4.5   CHLORIDE 92* 93* 93*   CO2 27 26 26   GLUCOSE 155* 149* 144*   BUN 56* 30* 37*   CREATININE 2.41* 1.60* 2.07*   CALCIUM 8.3* 7.9* 8.3*                   Imaging  DX-CHEST-PORTABLE (1 VIEW)   Final Result         No significant interval change.      Unchanged diffuse interstitial prominence.      EC-ECHOCARDIOGRAM COMPLETE W/O CONT   Final Result      US-RUQ   Final Result      Cholelithiasis with mild gallbladder wall thickening but no positive sonographic Troncoso sign. This could indicate chronic cholecystitis or incomplete distention      DX-CHEST-PORTABLE (1 VIEW)   Final Result      1.  Satisfactory position of new right IJV multilumen dialysis catheter. No pneumothorax identified.      2.  Unchanged mild volume overload pattern.      DX-CHEST-PORTABLE (1 VIEW)   Final Result      No acute cardiac or pulmonary abnormality is noted. Stable cardiomegaly with increased interstitial opacity diffusely.           Assessment/Plan  Hyperkalemia- (present on admission)  Assessment & Plan  Resolved with HD.    Acute renal failure superimposed on stage 4 chronic kidney disease (HCC)- (present on admission)  Assessment & Plan  Had recent admission, given lasix at discharge and new BP meds, metoprolol and norvasc.  HR 37 and low BP on admission.  Had HD emergently x 3 with improvement of SOB, 1 L off yesterday, 2 L off today.  Dr. Pompa following for HD ongoing determination, for now plans on TTS schedule.  Will need temporary HD catheter placed prior to dc and HD chair set  up.  Patient states she gets better, then goes back to SOB for several months off and on.       7/7 HD per nephro, monitoring for renal recovery  7/8  S/p HD, ?outpatient HD, nephro to arrange  Need to arrange ride to HD 3x/weekly from halfway vs snf  CM to assist  - encourage activity, OOB bid  On 5L O2    7/9 s/p HD (7/8) with hypotension, now resovled  - HD per nephrology  Improving BNP    Pneumonia due to infectious organism  Assessment & Plan  Had been treated with 5 days rocephin, 3 days zithromax since admission.  CXR improved edema, no definite infiltrate to suggest pneumonia  C/o productive sputum, ordered sputum culture, hold on abx for now.    Leukocytosis- (present on admission)  Assessment & Plan  2/2 steroids.    Acute exacerbation of chronic obstructive pulmonary disease (COPD) (HCC)- (present on admission)  Assessment & Plan  Patient admitted with COPD exacerbation  7/2 tapering solumedrol IV to po prednisone 40 mg daily x 4 days.  7/4:  Sputum culture ordered for productive cough.    DM type 2, uncontrolled, with renal complications (HCC)- (present on admission)  Assessment & Plan  SSI  Diabetic, renal diet.    UTI (urinary tract infection) due to Enterococcus  Assessment & Plan  VRE on urine culture ss zyvox  Increasing wbc daily.  7/6:  Treat zyvox x 7 days.    7/7 ongoing wbc  F/u am labs    7/8 improving wbc  Cont zyvox 7/13    Renovascular hypertension  Assessment & Plan  Ordered PRN BP meds for sbp >160 or DBP >90 hydralazine.  Monitor  Avoid BB since she went into cardiogenic shock 2/2 bradycardia.    Chronic hepatitis C without hepatic coma (HCC)- (present on admission)  Assessment & Plan  Will need outpatient treatment.  Elevated LFTs.    Junctional bradycardia- (present on admission)  Assessment & Plan  Combination of hyperkalemia, acute on CKD4 and metoprolol, norvasc  Now resolved s/p HD.    Acute on chronic respiratory failure with hypoxia (HCC)- (present on admission)  Assessment &  Plan  Initially admitted to ICU for respiratory support.  No intubation is noted in the record.    Anemia- (present on admission)  Assessment & Plan  2/2 CKD stage 4.    7/8 hgb 6.6, plan for transfusion today  - f/u am cbc, monitor    7/9 s/p 2 u prbc, hgb 9  F/u am labs    Pulmonary hypertension (HCC)- (present on admission)  Assessment & Plan  EF 80%  RVP elevated 55-60%.  Likely will need some diuretic for pulmonary edema.  Currently on HD with 2 L removed on 7/3.      Tobacco dependence- (present on admission)  Assessment & Plan  Recommend cessation    Chronic use of opiate drug for therapeutic purpose- (present on admission)  Assessment & Plan  On oxycodone 10 mg tid at home, continued in hospital    EDITH (obstructive sleep apnea)- (present on admission)  Assessment & Plan  ongoing    Hypothyroidism- (present on admission)  Assessment & Plan  Continue home meds.    Essential hypertension- (present on admission)  Assessment & Plan  7/9 uncontrolled, with intermittent hypotension with HD  Monitor closely, add norvasc with holding parameters       VTE prophylaxis: heparin

## 2020-07-09 NOTE — CARE PLAN
Problem: Infection  Goal: Will remain free from infection  Outcome: PROGRESSING AS EXPECTED   Appropriate protocols and standards utilized to minimize likelihood of infection and the spread of infection. Proper hand hygiene utilized and pt and family members educated on hand hygiene.    Problem: Knowledge Deficit  Goal: Knowledge of disease process/condition, treatment plan, diagnostic tests, and medications will improve  Outcome: PROGRESSING AS EXPECTED   Pt educated on POC for the night, disease process, upcoming tests, and medication information. Will continue to answer questions and educate pt as necessary.

## 2020-07-09 NOTE — PROGRESS NOTES
Assumed care of patient at 0700. Received bedside report. Patient resting in bed, on 5 L of O2. Tele box in place. No signs of distress. Will continue to monitor.

## 2020-07-09 NOTE — PROGRESS NOTES
Nephrology Daily Progress Note    Date of Service  7/9/2020    Chief Complaint  69 y.o. female with a history of CKD 3, COPD who presented 6/30/2020 with shortness of breath, found to have bradycardia, hypotension, and HAN.    Interval Problem Update  7/1 -patient had dialysis yesterday with no fluid removed, and resolution of shock on completion of dialysis.  Patient had 500 mL of urine output documented last 24 hours.  Patient had dialysis again this morning with 1 L removed.  Patient says her breathing is better.  Denies chest pain, shortness of breath at this time.  7/3 -patient had third session of dialysis yesterday with 2 L removed.  Tolerated well.  Denies chest pain this morning.  Complains of continued shortness of breath, but improved from admission.  7/5 -patient had dialysis yesterday with 1.5 L removed.  Patient says she is urinating, but no urine output being recorded.  Patient says her shortness of breath is still there, but better compared to admission.  Patient denies chest pain.  7/6 -doing better, less SOB  Scheduled for HD TTS  7/9 -doing well, improved SOB  UOP 1500 cc/24 H  Holding dialysis -watching for recovery    Review of Systems  Review of Systems   Constitutional: Negative for chills, fever, malaise/fatigue and weight loss.   HENT: Negative for congestion, hearing loss and sinus pain.    Eyes: Negative.    Respiratory: Negative for cough, hemoptysis, shortness of breath and wheezing.    Cardiovascular: Positive for leg swelling. Negative for chest pain, palpitations and orthopnea.   Gastrointestinal: Negative for abdominal pain, nausea and vomiting.   Genitourinary: Negative for dysuria, frequency, hematuria and urgency.   Skin: Negative.    All other systems reviewed and are negative.       Physical Exam  Temp:  [35.8 °C (96.5 °F)-36.4 °C (97.5 °F)] 36.3 °C (97.3 °F)  Pulse:  [62-72] 62  Resp:  [16-18] 16  BP: (133-178)/(29-63) 146/54  SpO2:  [93 %-97 %] 94 %    Physical Exam    Constitutional: She is oriented to person, place, and time. She appears well-developed and well-nourished. No distress.   HENT:   Head: Normocephalic and atraumatic.   Nose: Nose normal.   Mouth/Throat: Oropharynx is clear and moist.   Eyes: Pupils are equal, round, and reactive to light. Conjunctivae and EOM are normal.   Neck: Normal range of motion. Neck supple. No thyromegaly present.   Cardiovascular: Normal rate, regular rhythm, normal heart sounds and intact distal pulses. Exam reveals no gallop and no friction rub.   Pulmonary/Chest: Effort normal and breath sounds normal. No respiratory distress. She has no wheezes. She has no rales.   Abdominal: Soft. Bowel sounds are normal. She exhibits no distension and no mass. There is no abdominal tenderness. There is no guarding.   Musculoskeletal:         General: Edema present.   Neurological: She is alert and oriented to person, place, and time. A cranial nerve deficit is present.   Skin: Skin is warm. No rash noted. No erythema.   Psychiatric: She has a normal mood and affect. Her behavior is normal. Judgment and thought content normal.   Nursing note and vitals reviewed.  Access: Right IJ temporary dialysis catheter.      Fluids    Intake/Output Summary (Last 24 hours) at 7/9/2020 1624  Last data filed at 7/9/2020 1613  Gross per 24 hour   Intake 1400 ml   Output 4450 ml   Net -3050 ml       Laboratory  Labs reviewed, pertinent labs below.  Recent Labs     07/07/20 0303 07/08/20  0220 07/09/20  0504   WBC 15.8* 11.4* 10.4   RBC 2.33* 2.17* 2.97*   HEMOGLOBIN 7.0* 6.6* 9.1*   HEMATOCRIT 22.3* 21.2* 28.4*   MCV 95.7 97.7 95.6   MCH 30.0 30.4 30.6   MCHC 31.4* 31.1* 32.0*   RDW 51.1* 53.5* 50.6*   PLATELETCT 205 203 188   MPV 8.8* 8.8* 8.4*     Recent Labs     07/07/20  0303 07/08/20  0220 07/09/20  0504   SODIUM 129* 129* 130*   POTASSIUM 4.6 4.0 4.5   CHLORIDE 92* 93* 93*   CO2 27 26 26   GLUCOSE 155* 149* 144*   BUN 56* 30* 37*   CREATININE 2.41* 1.60* 2.07*    CALCIUM 8.3* 7.9* 8.3*               URINALYSIS:  Lab Results   Component Value Date/Time    COLORURINE DK Yellow 06/15/2019 0245    CLARITY Turbid (A) 06/15/2019 0245    SPECGRAVITY 1.020 06/15/2019 0245    PHURINE 5.0 06/15/2019 0245    KETONES Trace (A) 06/15/2019 0245    PROTEINURIN 300 (A) 06/15/2019 0245    BILIRUBINUR Negative 06/15/2019 0245    UROBILU 1.0 06/15/2019 0245    NITRITE Negative 06/15/2019 0245    LEUKESTERAS Negative 06/15/2019 0245    OCCULTBLOOD Negative 06/15/2019 0245     UPC  No results found for: TOTPROTUR No results found for: CREATININEU      Imaging reviewed  DX-CHEST-PORTABLE (1 VIEW)   Final Result         No significant interval change.      Unchanged diffuse interstitial prominence.      EC-ECHOCARDIOGRAM COMPLETE W/O CONT   Final Result      US-RUQ   Final Result      Cholelithiasis with mild gallbladder wall thickening but no positive sonographic Troncoso sign. This could indicate chronic cholecystitis or incomplete distention      DX-CHEST-PORTABLE (1 VIEW)   Final Result      1.  Satisfactory position of new right IJV multilumen dialysis catheter. No pneumothorax identified.      2.  Unchanged mild volume overload pattern.      DX-CHEST-PORTABLE (1 VIEW)   Final Result      No acute cardiac or pulmonary abnormality is noted. Stable cardiomegaly with increased interstitial opacity diffusely.            Current Facility-Administered Medications   Medication Dose Route Frequency Provider Last Rate Last Dose   • amLODIPine (NORVASC) tablet 5 mg  5 mg Oral Q DAY GHULAM DimasOMelonie   5 mg at 07/09/20 1059   • heparin injection 1,500 Units  1,500 Units Intravenous DIALYSIS PRN Rosi Bansal M.D.   1,500 Units at 07/08/20 1430   • ipratropium-albuterol (DUONEB) nebulizer solution  3 mL Nebulization Q2HRS PRN (RT) Christina Calderon M.D.       • hydrALAZINE (APRESOLINE) tablet 25 mg  25 mg Oral Q8HRS PRN Christina Calderon M.D.   25 mg at 07/09/20 0806   • linezolid (ZYVOX) tablet 600 mg  600 mg  Oral Q12HRS Christina Calderon M.D.   600 mg at 07/09/20 0456   • heparin injection 2,000 Units  2,000 Units Intravenous DIALYSIS PRN Eric Pompa M.D.   2,000 Units at 07/07/20 1439   • oxyCODONE immediate release (ROXICODONE) tablet 10 mg  10 mg Oral TID PRN Christina Calderon M.D.   10 mg at 07/09/20 0806   • lactulose 20 GM/30ML solution 30 mL  30 mL Oral BID W/MEALS PRN Heber Armstrong M.D.       • NS (BOLUS) infusion 250 mL  250 mL Intravenous DIALYSIS PRN Eric Pompa M.D.       • albumin human 25% solution 12.5 g  12.5 g Intravenous DIALYSIS PRN Eric Pompa M.D. 150 mL/hr at 06/30/20 1513 12.5 g at 06/30/20 1513   • Respiratory Therapy Consult   Nebulization Continuous RT Roger Florence Jr., D.O.       • DULoxetine (CYMBALTA) capsule 60 mg  60 mg Oral DAILY Roger Florence Jr., D.O.   60 mg at 07/09/20 0456   • hydrOXYzine HCl (ATARAX) tablet 25 mg  25 mg Oral TID PRN Roger Florence Jr. D.O.   25 mg at 07/03/20 0027   • levothyroxine (SYNTHROID) tablet 75 mcg  75 mcg Oral AM ES Roger Florence Jr. D.O.   75 mcg at 07/09/20 0457   • lidocaine (LIDODERM) 5 % 1 Patch  1 Patch Transdermal Q24HRS PRN Roger Florence Jr., D.O.   1 Patch at 07/04/20 2211   • traZODone (DESYREL) tablet 300 mg  300 mg Oral BID Roger Florence Jr. D.O.   300 mg at 07/09/20 0456   • rosuvastatin (CRESTOR) tablet 10 mg  10 mg Oral Q EVENING Roger Florence Jr. D.O.   10 mg at 07/08/20 1806   • senna-docusate (PERICOLACE or SENOKOT S) 8.6-50 MG per tablet 2 Tab  2 Tab Oral BID Roger Florence Jr., D.O.   2 Tab at 07/06/20 0532    And   • polyethylene glycol/lytes (MIRALAX) PACKET 1 Packet  1 Packet Oral QDAY PRN Roger Florence Jr. D.O.   1 Packet at 07/02/20 0500    And   • magnesium hydroxide (MILK OF MAGNESIA) suspension 30 mL  30 mL Oral QDAY PRN Roger Florence Jr., D.O.        And   • bisacodyl (DULCOLAX) suppository 10 mg  10 mg Rectal QDAY PRN Roger Florence Jr., D.O.       • [Held by provider] heparin injection 5,000 Units  5,000  Units Subcutaneous Q8HRS ROBY Sarmiento Jr..OMelonie   5,000 Units at 07/01/20 0540   • ondansetron (ZOFRAN) syringe/vial injection 4 mg  4 mg Intravenous Q4HRS PRN GHULAM Sarmiento Jr.O.       • ondansetron (ZOFRAN ODT) dispertab 4 mg  4 mg Oral Q4HRS PRN ROBY Sarmiento Jr..LANCE.       • Pharmacy Consult Request ...Pain Management Review 1 Each  1 Each Other PHARMACY TO DOSE ROBY Sarmiento Jr..LANCE.       • lactated ringers infusion (BOLUS)  500 mL Intravenous Once PRN ROBY Sarmiento Jr..O.       • umeclidinium-vilanterol (ANORO ELLIPTA) inhaler 1 Puff  1 Puff Inhalation QDAILY (RT) ROBY Sarmiento Jr..OMelonie   1 Puff at 07/09/20 0626   • fluticasone (FLOVENT HFA) 44 MCG/ACT inhaler 88 mcg  2 Puff Inhalation DAILY ROBY Sarmiento Jr..O.   88 mcg at 07/09/20 0600   • ipratropium-albuterol (DUONEB) nebulizer solution  3 mL Nebulization Q2HRS PRN (RT) Heber Armstrong M.D.   3 mL at 07/02/20 0317   • heparin injection 2,400 Units  2,400 Units Intracatheter ACUTE DIALYSIS PRN Eric Pompa M.D.   2,400 Units at 07/08/20 1648   • insulin regular (HUMULIN R) injection 1-6 Units  1-6 Units Subcutaneous 4X/DAY ACHLUIS Glover M.D.   Stopped at 07/09/20 0700    And   • glucose 4 g chewable tablet 16 g  16 g Oral Q15 MIN PRN Bob Glover M.D.        And   • dextrose 50% (D50W) injection 50 mL  50 mL Intravenous Q15 MIN PRN Bob Glover M.D.             Assessment/Plan  69 y.o. female with a history of CKD 3, COPD who presented 6/30/2020 with shortness of breath, found to have bradycardia, hypotension, and HAN.     1.  HAN on CKD stage III - good UOP, holding dialysis-possible recovery    2.  HTN: BP well controlled     3.  Volume: well controlled now    4.  Hyponatremia:improving- avoid hypotonic fluids/free water     5.  Anemia: to monitor     6.  Cardiogenic shock, from hemodynamically significant bradycardia -improved -clinically stable     Recs: to monitor for recovery             No need for  emergent dialysis today             Will reassess for HD needs AM             Low Na diet             Daily labs

## 2020-07-09 NOTE — CARE PLAN
Problem: Venous Thromboembolism (VTW)/Deep Vein Thrombosis (DVT) Prevention:  Goal: Patient will participate in Venous Thrombosis (VTE)/Deep Vein Thrombosis (DVT)Prevention Measures  Outcome: PROGRESSING AS EXPECTED   Patient educated on DVT risks. Patient VS are stable. No signs of DVT. Will continue to monitor for change status.     Problem: Pain Management  Goal: Pain level will decrease to patient's comfort goal  Outcome: PROGRESSING AS EXPECTED   Patient educated to pain scale system. Patient encouraged to verbalize discomfort. Patient taught about non-pharmacological pain management. Patient is comfortable at this time without statements of discomfort or pain.

## 2020-07-09 NOTE — PROGRESS NOTES
Report received from Rn. Assumed pt care. Pt is resting in bed on 5 L 02 NC. Pt A&O x 4. Fall precautions in place, call light and belongings within reach, bed in lowest position. No signs of distress.

## 2020-07-10 LAB
ALBUMIN SERPL BCP-MCNC: 2.7 G/DL (ref 3.2–4.9)
ALBUMIN/GLOB SERPL: 1.1 G/DL
ALP SERPL-CCNC: 60 U/L (ref 30–99)
ALT SERPL-CCNC: 70 U/L (ref 2–50)
ANION GAP SERPL CALC-SCNC: 9 MMOL/L (ref 7–16)
AST SERPL-CCNC: 27 U/L (ref 12–45)
BASOPHILS # BLD AUTO: 0.3 % (ref 0–1.8)
BASOPHILS # BLD: 0.03 K/UL (ref 0–0.12)
BILIRUB SERPL-MCNC: 0.5 MG/DL (ref 0.1–1.5)
BUN SERPL-MCNC: 40 MG/DL (ref 8–22)
CALCIUM SERPL-MCNC: 8.3 MG/DL (ref 8.5–10.5)
CHLORIDE SERPL-SCNC: 95 MMOL/L (ref 96–112)
CO2 SERPL-SCNC: 27 MMOL/L (ref 20–33)
CREAT SERPL-MCNC: 2.19 MG/DL (ref 0.5–1.4)
EOSINOPHIL # BLD AUTO: 0.24 K/UL (ref 0–0.51)
EOSINOPHIL NFR BLD: 2.6 % (ref 0–6.9)
ERYTHROCYTE [DISTWIDTH] IN BLOOD BY AUTOMATED COUNT: 50.3 FL (ref 35.9–50)
GLOBULIN SER CALC-MCNC: 2.4 G/DL (ref 1.9–3.5)
GLUCOSE BLD-MCNC: 118 MG/DL (ref 65–99)
GLUCOSE BLD-MCNC: 141 MG/DL (ref 65–99)
GLUCOSE BLD-MCNC: 158 MG/DL (ref 65–99)
GLUCOSE BLD-MCNC: 166 MG/DL (ref 65–99)
GLUCOSE BLD-MCNC: 181 MG/DL (ref 65–99)
GLUCOSE SERPL-MCNC: 162 MG/DL (ref 65–99)
HCT VFR BLD AUTO: 25.9 % (ref 37–47)
HGB BLD-MCNC: 8.2 G/DL (ref 12–16)
IMM GRANULOCYTES # BLD AUTO: 0.17 K/UL (ref 0–0.11)
IMM GRANULOCYTES NFR BLD AUTO: 1.8 % (ref 0–0.9)
LYMPHOCYTES # BLD AUTO: 0.86 K/UL (ref 1–4.8)
LYMPHOCYTES NFR BLD: 9.1 % (ref 22–41)
MCH RBC QN AUTO: 30.8 PG (ref 27–33)
MCHC RBC AUTO-ENTMCNC: 31.7 G/DL (ref 33.6–35)
MCV RBC AUTO: 97.4 FL (ref 81.4–97.8)
MONOCYTES # BLD AUTO: 0.93 K/UL (ref 0–0.85)
MONOCYTES NFR BLD AUTO: 9.9 % (ref 0–13.4)
NEUTROPHILS # BLD AUTO: 7.18 K/UL (ref 2–7.15)
NEUTROPHILS NFR BLD: 76.3 % (ref 44–72)
NRBC # BLD AUTO: 0 K/UL
NRBC BLD-RTO: 0 /100 WBC
PHOSPHATE SERPL-MCNC: 4.2 MG/DL (ref 2.5–4.5)
PLATELET # BLD AUTO: 194 K/UL (ref 164–446)
PMV BLD AUTO: 8.6 FL (ref 9–12.9)
POTASSIUM SERPL-SCNC: 4.6 MMOL/L (ref 3.6–5.5)
PROT SERPL-MCNC: 5.1 G/DL (ref 6–8.2)
RBC # BLD AUTO: 2.66 M/UL (ref 4.2–5.4)
SODIUM SERPL-SCNC: 131 MMOL/L (ref 135–145)
WBC # BLD AUTO: 9.4 K/UL (ref 4.8–10.8)

## 2020-07-10 PROCEDURE — 700102 HCHG RX REV CODE 250 W/ 637 OVERRIDE(OP): Performed by: INTERNAL MEDICINE

## 2020-07-10 PROCEDURE — A9270 NON-COVERED ITEM OR SERVICE: HCPCS | Performed by: INTERNAL MEDICINE

## 2020-07-10 PROCEDURE — 94640 AIRWAY INHALATION TREATMENT: CPT

## 2020-07-10 PROCEDURE — 700102 HCHG RX REV CODE 250 W/ 637 OVERRIDE(OP): Performed by: HOSPITALIST

## 2020-07-10 PROCEDURE — 80053 COMPREHEN METABOLIC PANEL: CPT

## 2020-07-10 PROCEDURE — 770020 HCHG ROOM/CARE - TELE (206)

## 2020-07-10 PROCEDURE — 94760 N-INVAS EAR/PLS OXIMETRY 1: CPT

## 2020-07-10 PROCEDURE — 85025 COMPLETE CBC W/AUTO DIFF WBC: CPT

## 2020-07-10 PROCEDURE — 82962 GLUCOSE BLOOD TEST: CPT | Mod: 91

## 2020-07-10 PROCEDURE — A9270 NON-COVERED ITEM OR SERVICE: HCPCS | Performed by: HOSPITALIST

## 2020-07-10 PROCEDURE — 99233 SBSQ HOSP IP/OBS HIGH 50: CPT | Performed by: INTERNAL MEDICINE

## 2020-07-10 PROCEDURE — 84100 ASSAY OF PHOSPHORUS: CPT

## 2020-07-10 RX ORDER — FUROSEMIDE 20 MG/1
20 TABLET ORAL
Status: DISCONTINUED | OUTPATIENT
Start: 2020-07-10 | End: 2020-07-13

## 2020-07-10 RX ADMIN — AMLODIPINE BESYLATE 5 MG: 5 TABLET ORAL at 05:06

## 2020-07-10 RX ADMIN — LINEZOLID 600 MG: 600 TABLET, FILM COATED ORAL at 05:07

## 2020-07-10 RX ADMIN — FLUTICASONE PROPIONATE 88 MCG: 44 AEROSOL, METERED RESPIRATORY (INHALATION) at 06:00

## 2020-07-10 RX ADMIN — LEVOTHYROXINE SODIUM 75 MCG: 75 TABLET ORAL at 05:07

## 2020-07-10 RX ADMIN — OXYCODONE HYDROCHLORIDE 10 MG: 10 TABLET ORAL at 23:13

## 2020-07-10 RX ADMIN — HYDRALAZINE HYDROCHLORIDE 25 MG: 25 TABLET, FILM COATED ORAL at 16:47

## 2020-07-10 RX ADMIN — INSULIN HUMAN 1 UNITS: 100 INJECTION, SOLUTION PARENTERAL at 20:51

## 2020-07-10 RX ADMIN — FUROSEMIDE 20 MG: 20 TABLET ORAL at 16:42

## 2020-07-10 RX ADMIN — TRAZODONE HYDROCHLORIDE 300 MG: 150 TABLET ORAL at 05:07

## 2020-07-10 RX ADMIN — HYDROXYZINE HYDROCHLORIDE 25 MG: 25 TABLET, FILM COATED ORAL at 16:42

## 2020-07-10 RX ADMIN — LINEZOLID 600 MG: 600 TABLET, FILM COATED ORAL at 16:41

## 2020-07-10 RX ADMIN — INSULIN HUMAN 1 UNITS: 100 INJECTION, SOLUTION PARENTERAL at 11:35

## 2020-07-10 RX ADMIN — DULOXETINE HYDROCHLORIDE 60 MG: 60 CAPSULE, DELAYED RELEASE ORAL at 05:07

## 2020-07-10 RX ADMIN — OXYCODONE HYDROCHLORIDE 10 MG: 10 TABLET ORAL at 16:41

## 2020-07-10 RX ADMIN — TRAZODONE HYDROCHLORIDE 300 MG: 150 TABLET ORAL at 20:47

## 2020-07-10 RX ADMIN — OXYCODONE HYDROCHLORIDE 10 MG: 10 TABLET ORAL at 04:05

## 2020-07-10 RX ADMIN — ROSUVASTATIN CALCIUM 10 MG: 20 TABLET, FILM COATED ORAL at 16:41

## 2020-07-10 RX ADMIN — UMECLIDINIUM BROMIDE AND VILANTEROL TRIFENATATE 1 PUFF: 62.5; 25 POWDER RESPIRATORY (INHALATION) at 07:54

## 2020-07-10 ASSESSMENT — ENCOUNTER SYMPTOMS
SORE THROAT: 0
HEMOPTYSIS: 0
WEIGHT LOSS: 0
ORTHOPNEA: 0
VOMITING: 0
NECK PAIN: 0
NAUSEA: 0
PALPITATIONS: 0
FLANK PAIN: 0
SINUS PAIN: 0
WEAKNESS: 1
LOSS OF CONSCIOUSNESS: 0
ABDOMINAL PAIN: 0
DIZZINESS: 0
DIARRHEA: 0
CHILLS: 0
MYALGIAS: 1
COUGH: 0
DIAPHORESIS: 0
MEMORY LOSS: 0
SHORTNESS OF BREATH: 0
FOCAL WEAKNESS: 0
FEVER: 0
EYES NEGATIVE: 1
WHEEZING: 0
CLAUDICATION: 0

## 2020-07-10 NOTE — PROGRESS NOTES
Hospital Medicine Daily Progress Note    Date of Service  7/10/2020    Chief Complaint  69 y.o. female admitted 6/30/2020 with junctional bradycardia rate 37 with cardiogenic shock, SOB.    Hospital Course    7/2:  This 70 yo female admitted by critical care to ICU for HR 37 junctional rhythm, COPD on 4 LPM NC at home, DM, hypothyroidism, hepatitis C, HTN, CHF, CKD stage 4 followed by Dr. Nava presented with worsening SOB, hypoxia and cough.  She was recently admitted 6/15/20 for COPD exacerbation and pulmonary edema, given amlodopine, metoprolol and lasix.   COVID 19 negative x3.  K was elevated 6.3.  Nephrology consulted and started HD with improvement of HR, BP and SOB.  3 HD sessions performed including today with 2 L removed.  She is feeling better, but still swollen in extremities, on 4 LPM NC baseline O2, ready for dc out of ICU.  Her BP is currently controlled at 97/69, no BP meds given at this point.  K normalized and Cr imporved.  Wbc increased to 23K on IV steroids.  I have started oral taper from solumedrol 60 IV q 12 to 40mg po daily.  It is unclear why patient was started on Rocephin on admission.  I have ordered a urine culture, no UA on admission or culture.  No evidence for pneumonia.  7/3:  Patient still with orthopnea, but no wheeze, wet cough noted today.  No HD today, waiting until tomorrow.  Has temporary triple lumen RIJ.  Wbc decreasing, Cr improving 3.24 to 2.58.  EF 80% but RVP elevated 55-60 with moderate mitral regurg noted. SR 76-92.  7/4:  Remains NSR on monitor.  No SOB, had 1.5 L removed during HD today.  Cr improving, urine output improving daily.  States having productive sputum thick brown, ordered sputum culture.  Lungs CTA b/l.  7/5:  No change, continues on baseline O2 4 LPM NC, no wheeze or crackles.  7/6:  C/o productive sputum, had 5 days of Rocephin, zithromax completed.  Ordered sputum culture, sent today with thick brown.  Wbc increased last 2 days, repeat CXR no  infiltrate, improved pulmonary edema since 6/30 CXR.  + VRE on urine culture, started zyvox bid x 7d ays.*    7/7: Patient reports making 250 mL urine output  Denies any dysuria  Plan for hemodialysis today  On Zyvox  She reports generalized weakness, will likely need placement, from assisted living facility    7/8 we will monitor ins and outs closely  Discussed with nephrology, Dr. Mccabe  Denies dysuria  Generalized weakness, requiring 1 person assist  Encourage activity  Discussed with RN  Baseline oxygen needs of 4 L, will taper as tolerated  Anemia requiring transfusion, will monitor closely  Reports lethargy today    7/9 no new complaints  Patient reports urine output  Per nephrology, will start trial of Lasix  Hemodialysis per nephrology    7/10 improving shortness of breath  Improving urinary output of greater than 1200 mL's  Dialysis per nephrology  Encourage activity  Consultants/Specialty  Dr. Pompa  Critical care    Code Status  DNAR, I ok    Disposition   getting HD per nephrology   Hep C +, no treatment yet, patient understands the benefits of treatment and will need to pursue outpatient tx.  Resides at [facility].  OT/PT no needs.  Baseline O2 5 LPM NC.    snf referral vs [facility] with home health, needs transport assistance to hemodialysis center 3 times a week   to assist  Monitor strict ins and outs  Taper oxygen  Out of bed twice daily  Zyvox through July 13    Out of bed for meals    Review of Systems  Review of Systems   Constitutional: Negative for chills, diaphoresis, fever and malaise/fatigue.   HENT: Negative for congestion and sore throat.    Respiratory: Negative for shortness of breath and wheezing.    Cardiovascular: Positive for leg swelling. Negative for chest pain, palpitations and claudication.   Gastrointestinal: Negative for abdominal pain, melena and nausea.   Genitourinary: Negative for dysuria and flank pain.   Musculoskeletal: Positive for myalgias. Negative for neck pain.    Neurological: Positive for weakness. Negative for dizziness, focal weakness and loss of consciousness.   Psychiatric/Behavioral: Negative for memory loss.        Physical Exam  Temp:  [36.1 °C (96.9 °F)-36.8 °C (98.3 °F)] 36.7 °C (98.1 °F)  Pulse:  [61-69] 63  Resp:  [16-18] 18  BP: (125-156)/(46-70) 125/61  SpO2:  [90 %-99 %] 99 %    Physical Exam  Vitals signs and nursing note reviewed.   Constitutional:       General: She is not in acute distress.     Appearance: She is well-developed. She is not ill-appearing or diaphoretic.   HENT:      Head: Normocephalic and atraumatic.      Nose: Nose normal.      Mouth/Throat:      Pharynx: No oropharyngeal exudate.   Eyes:      Extraocular Movements: Extraocular movements intact.      Pupils: Pupils are equal, round, and reactive to light.   Neck:      Musculoskeletal: Normal range of motion and neck supple.      Thyroid: No thyromegaly.      Vascular: No JVD.      Trachea: No tracheal deviation.   Cardiovascular:      Rate and Rhythm: Normal rate and regular rhythm.      Pulses: Normal pulses.   Pulmonary:      Effort: Pulmonary effort is normal. No respiratory distress.      Breath sounds: No wheezing.      Comments: cta b/l  Abdominal:      General: Bowel sounds are normal. There is no distension.      Palpations: Abdomen is soft.      Tenderness: There is no abdominal tenderness.   Musculoskeletal: Normal range of motion.         General: Swelling present. No tenderness.      Right lower leg: Edema present.      Left lower leg: Edema present.   Skin:     General: Skin is warm and dry.      Coloration: Skin is not pale.   Neurological:      Mental Status: She is alert and oriented to person, place, and time.      Cranial Nerves: No cranial nerve deficit.      Sensory: No sensory deficit.      Motor: Weakness present. No abnormal muscle tone.   Psychiatric:         Behavior: Behavior normal.         Thought Content: Thought content normal.          Fluids    Intake/Output Summary (Last 24 hours) at 7/10/2020 1243  Last data filed at 7/10/2020 0845  Gross per 24 hour   Intake 720 ml   Output 800 ml   Net -80 ml       Laboratory  Recent Labs     07/08/20 0220 07/09/20  0504 07/10/20  0355   WBC 11.4* 10.4 9.4   RBC 2.17* 2.97* 2.66*   HEMOGLOBIN 6.6* 9.1* 8.2*   HEMATOCRIT 21.2* 28.4* 25.9*   MCV 97.7 95.6 97.4   MCH 30.4 30.6 30.8   MCHC 31.1* 32.0* 31.7*   RDW 53.5* 50.6* 50.3*   PLATELETCT 203 188 194   MPV 8.8* 8.4* 8.6*     Recent Labs     07/08/20 0220 07/09/20  0504 07/10/20  0355   SODIUM 129* 130* 131*   POTASSIUM 4.0 4.5 4.6   CHLORIDE 93* 93* 95*   CO2 26 26 27   GLUCOSE 149* 144* 162*   BUN 30* 37* 40*   CREATININE 1.60* 2.07* 2.19*   CALCIUM 7.9* 8.3* 8.3*                   Imaging  DX-CHEST-PORTABLE (1 VIEW)   Final Result         No significant interval change.      Unchanged diffuse interstitial prominence.      EC-ECHOCARDIOGRAM COMPLETE W/O CONT   Final Result      US-RUQ   Final Result      Cholelithiasis with mild gallbladder wall thickening but no positive sonographic Troncoso sign. This could indicate chronic cholecystitis or incomplete distention      DX-CHEST-PORTABLE (1 VIEW)   Final Result      1.  Satisfactory position of new right IJV multilumen dialysis catheter. No pneumothorax identified.      2.  Unchanged mild volume overload pattern.      DX-CHEST-PORTABLE (1 VIEW)   Final Result      No acute cardiac or pulmonary abnormality is noted. Stable cardiomegaly with increased interstitial opacity diffusely.           Assessment/Plan  Hyperkalemia- (present on admission)  Assessment & Plan  Resolved with HD.    Acute renal failure superimposed on stage 4 chronic kidney disease (HCC)- (present on admission)  Assessment & Plan  Had recent admission, given lasix at discharge and new BP meds, metoprolol and norvasc.  HR 37 and low BP on admission.  Had HD emergently x 3 with improvement of SOB, 1 L off yesterday, 2 L off  today.  Dr. Pompa following for HD ongoing determination, for now plans on TTS schedule.  Will need temporary HD catheter placed prior to dc and HD chair set up.  Patient states she gets better, then goes back to SOB for several months off and on.       7/7 HD per nephro, monitoring for renal recovery  7/8  S/p HD, ?outpatient HD, nephro to arrange  Need to arrange ride to HD 3x/weekly from snf vs snf  CM to assist  - encourage activity, OOB bid  On 5L O2    7/9 s/p HD (7/8) with hypotension, now resovled  - HD per nephrology  Improving BNP    7/10 bp stable, UOP 1250 ml  nephro following, ?diuretics vs ongoing HD  Appreciate input    Pneumonia due to infectious organism  Assessment & Plan  Had been treated with 5 days rocephin, 3 days zithromax since admission.  CXR improved edema, no definite infiltrate to suggest pneumonia  C/o productive sputum, ordered sputum culture, hold on abx for now.    Leukocytosis- (present on admission)  Assessment & Plan  2/2 steroids.    Acute exacerbation of chronic obstructive pulmonary disease (COPD) (HCC)- (present on admission)  Assessment & Plan  Patient admitted with COPD exacerbation  7/2 tapering solumedrol IV to po prednisone 40 mg daily x 4 days.  7/4:  Sputum culture ordered for productive cough.  7/10: 99 on 5L, taper to baseline needs as tolerated    DM type 2, uncontrolled, with renal complications (HCC)- (present on admission)  Assessment & Plan  SSI  Diabetic, renal diet.    UTI (urinary tract infection) due to Enterococcus  Assessment & Plan  VRE on urine culture ss zyvox  Increasing wbc daily.  7/6:  Treat zyvox x 7 days.    7/7 ongoing wbc  F/u am labs    7/8 improving wbc  Cont zyvox 7/13    Renovascular hypertension  Assessment & Plan  Ordered PRN BP meds for sbp >160 or DBP >90 hydralazine.  Monitor  Avoid BB since she went into cardiogenic shock 2/2 bradycardia.    Chronic hepatitis C without hepatic coma (HCC)- (present on admission)  Assessment & Plan  Will  need outpatient treatment.  Elevated LFTs.    Junctional bradycardia- (present on admission)  Assessment & Plan  Combination of hyperkalemia, acute on CKD4 and metoprolol, norvasc  Now resolved s/p HD.    Acute on chronic respiratory failure with hypoxia (HCC)- (present on admission)  Assessment & Plan  Initially admitted to ICU for respiratory support.  No intubation is noted in the record.    Anemia- (present on admission)  Assessment & Plan  2/2 CKD stage 4.    7/8 hgb 6.6, plan for transfusion today  - f/u am cbc, monitor    7/9 s/p 2 u prbc, hgb 9  F/u am labs    7/10 monitor cbc  Significant hypotension noted with anemia and HD, s/p transfusion  hgb 9 to 8, monitor    Pulmonary hypertension (HCC)- (present on admission)  Assessment & Plan  EF 80%  RVP elevated 55-60%.  Likely will need some diuretic for pulmonary edema.  Currently on HD with 2 L removed on 7/3.      Tobacco dependence- (present on admission)  Assessment & Plan  Recommend cessation    Chronic use of opiate drug for therapeutic purpose- (present on admission)  Assessment & Plan  On oxycodone 10 mg tid at home, continued in hospital    EDITH (obstructive sleep apnea)- (present on admission)  Assessment & Plan  ongoing    Hypothyroidism- (present on admission)  Assessment & Plan  Continue home meds.    Essential hypertension- (present on admission)  Assessment & Plan  7/9 uncontrolled, with intermittent hypotension with HD  Monitor closely, add norvasc with holding parameters       VTE prophylaxis: heparin

## 2020-07-10 NOTE — PROGRESS NOTES
Report received from Rn. Assumed pt care. Pt is on 5 liters 02 NC. Pt A&O x 4. Fall precautions in place, call light and belongings within reach, bed in lowest position. No signs of distress.

## 2020-07-10 NOTE — CARE PLAN
Problem: Safety  Goal: Will remain free from falls  Outcome: PROGRESSING AS EXPECTED  Intervention: Implement fall precautions  Flowsheets (Taken 7/10/2020 9206)  Environmental Precautions:   Treaded Slipper Socks on Patient   Personal Belongings, Wastebasket, Call Bell etc. in Easy Reach   Transferred to Stronger Side   Report Given to Other Health Care Providers Regarding Fall Risk   Bed in Low Position   Communication Sign for Patients & Families   Mobility Assessed & Appropriate Sign Placed  Note: Fall precautions in place: treaded slipper socks on, mobility signs posted, hourly rounding, bed in lowest position, belongings and call light are within reach, bed alarm on, and near nurses station.       Problem: Infection  Goal: Will remain free from infection  Outcome: PROGRESSING AS EXPECTED  Intervention: Implement standard precautions and perform hand washing before and after patient contact  Note: Proper hand hygiene before and after patient care to ensure patient will remain free from infection.

## 2020-07-10 NOTE — PROGRESS NOTES
Nephrology Daily Progress Note    Date of Service  7/10/2020    Chief Complaint  69 y.o. female with a history of CKD 3, COPD who presented 6/30/2020 with shortness of breath, found to have bradycardia, hypotension, and HAN.    Interval Problem Update  7/1 -patient had dialysis yesterday with no fluid removed, and resolution of shock on completion of dialysis.  Patient had 500 mL of urine output documented last 24 hours.  Patient had dialysis again this morning with 1 L removed.  Patient says her breathing is better.  Denies chest pain, shortness of breath at this time.  7/3 -patient had third session of dialysis yesterday with 2 L removed.  Tolerated well.  Denies chest pain this morning.  Complains of continued shortness of breath, but improved from admission.  7/5 -patient had dialysis yesterday with 1.5 L removed.  Patient says she is urinating, but no urine output being recorded.  Patient says her shortness of breath is still there, but better compared to admission.  Patient denies chest pain.  7/6 -doing better, less SOB  Scheduled for HD TTS  7/9 -doing well, improved SOB  UOP 1500 cc/24 H  Holding dialysis -watching for recovery  7/10 -doing well  Edema improving  SOB better  Good UOP  Holding dialysis  Review of Systems  Review of Systems   Constitutional: Negative for chills, fever, malaise/fatigue and weight loss.   HENT: Negative for congestion, hearing loss and sinus pain.    Eyes: Negative.    Respiratory: Negative for cough, hemoptysis, shortness of breath and wheezing.    Cardiovascular: Positive for leg swelling. Negative for chest pain, palpitations and orthopnea.   Gastrointestinal: Negative for abdominal pain, diarrhea, nausea and vomiting.   Genitourinary: Negative for dysuria, flank pain, frequency, hematuria and urgency.   Skin: Negative.    All other systems reviewed and are negative.       Physical Exam  Temp:  [36.1 °C (96.9 °F)-36.8 °C (98.3 °F)] 36.7 °C (98.1 °F)  Pulse:  [61-69]  63  Resp:  [16-18] 18  BP: (125-156)/(46-61) 125/61  SpO2:  [90 %-99 %] 99 %    Physical Exam   Constitutional: She is oriented to person, place, and time. She appears well-developed and well-nourished. No distress.   HENT:   Head: Normocephalic and atraumatic.   Nose: Nose normal.   Mouth/Throat: Oropharynx is clear and moist.   Eyes: Pupils are equal, round, and reactive to light. Conjunctivae and EOM are normal.   Neck: Normal range of motion. Neck supple. No thyromegaly present.   Cardiovascular: Normal rate and regular rhythm. Exam reveals no friction rub.   Pulmonary/Chest: Effort normal and breath sounds normal. No respiratory distress. She has no wheezes. She has no rales.   Abdominal: Soft. Bowel sounds are normal. She exhibits no distension and no mass. There is no abdominal tenderness. There is no guarding.   Musculoskeletal:         General: Edema present.      Comments: Edema improving   Neurological: She is alert and oriented to person, place, and time. No cranial nerve deficit.   Skin: Skin is warm. No rash noted. No erythema.   Nursing note and vitals reviewed.  Access: Right IJ temporary dialysis catheter.      Fluids    Intake/Output Summary (Last 24 hours) at 7/10/2020 1624  Last data filed at 7/10/2020 1355  Gross per 24 hour   Intake 340 ml   Output 400 ml   Net -60 ml       Laboratory  Labs reviewed, pertinent labs below.  Recent Labs     07/08/20 0220 07/09/20  0504 07/10/20  0355   WBC 11.4* 10.4 9.4   RBC 2.17* 2.97* 2.66*   HEMOGLOBIN 6.6* 9.1* 8.2*   HEMATOCRIT 21.2* 28.4* 25.9*   MCV 97.7 95.6 97.4   MCH 30.4 30.6 30.8   MCHC 31.1* 32.0* 31.7*   RDW 53.5* 50.6* 50.3*   PLATELETCT 203 188 194   MPV 8.8* 8.4* 8.6*     Recent Labs     07/08/20 0220 07/09/20  0504 07/10/20  0355   SODIUM 129* 130* 131*   POTASSIUM 4.0 4.5 4.6   CHLORIDE 93* 93* 95*   CO2 26 26 27   GLUCOSE 149* 144* 162*   BUN 30* 37* 40*   CREATININE 1.60* 2.07* 2.19*   CALCIUM 7.9* 8.3* 8.3*               URINALYSIS:  Lab  Results   Component Value Date/Time    COLORURINE DK Yellow 06/15/2019 0245    CLARITY Turbid (A) 06/15/2019 0245    SPECGRAVITY 1.020 06/15/2019 0245    PHURINE 5.0 06/15/2019 0245    KETONES Trace (A) 06/15/2019 0245    PROTEINURIN 300 (A) 06/15/2019 0245    BILIRUBINUR Negative 06/15/2019 0245    UROBILU 1.0 06/15/2019 0245    NITRITE Negative 06/15/2019 0245    LEUKESTERAS Negative 06/15/2019 0245    OCCULTBLOOD Negative 06/15/2019 0245     UPC  No results found for: TOTPROTUR No results found for: CREATININEU      Imaging reviewed  DX-CHEST-PORTABLE (1 VIEW)   Final Result         No significant interval change.      Unchanged diffuse interstitial prominence.      EC-ECHOCARDIOGRAM COMPLETE W/O CONT   Final Result      US-RUQ   Final Result      Cholelithiasis with mild gallbladder wall thickening but no positive sonographic Troncoso sign. This could indicate chronic cholecystitis or incomplete distention      DX-CHEST-PORTABLE (1 VIEW)   Final Result      1.  Satisfactory position of new right IJV multilumen dialysis catheter. No pneumothorax identified.      2.  Unchanged mild volume overload pattern.      DX-CHEST-PORTABLE (1 VIEW)   Final Result      No acute cardiac or pulmonary abnormality is noted. Stable cardiomegaly with increased interstitial opacity diffusely.            Current Facility-Administered Medications   Medication Dose Route Frequency Provider Last Rate Last Dose   • furosemide (LASIX) tablet 20 mg  20 mg Oral Q DAY Rosi Bansal M.D.       • amLODIPine (NORVASC) tablet 5 mg  5 mg Oral Q DAY Valorie Hemphill D.O.   5 mg at 07/10/20 0506   • heparin injection 1,500 Units  1,500 Units Intravenous DIALYSIS PRN Rosi Bansal M.D.   1,500 Units at 07/08/20 1430   • ipratropium-albuterol (DUONEB) nebulizer solution  3 mL Nebulization Q2HRS PRN (RT) Christina Calderon M.D.       • hydrALAZINE (APRESOLINE) tablet 25 mg  25 mg Oral Q8HRS PRN Christina Calderon M.D.   25 mg at 07/09/20 0806   • linezolid (ZYVOX)  tablet 600 mg  600 mg Oral Q12HRS Christina Calderon M.D.   600 mg at 07/10/20 0507   • heparin injection 2,000 Units  2,000 Units Intravenous DIALYSIS PRN Eric Pompa M.D.   2,000 Units at 07/07/20 1439   • oxyCODONE immediate release (ROXICODONE) tablet 10 mg  10 mg Oral TID PRN Christina Calderon M.D.   10 mg at 07/10/20 0405   • lactulose 20 GM/30ML solution 30 mL  30 mL Oral BID W/MEALS PRN Heber Armstrong M.D.       • NS (BOLUS) infusion 250 mL  250 mL Intravenous DIALYSIS PRN Eric Pompa M.D.       • albumin human 25% solution 12.5 g  12.5 g Intravenous DIALYSIS PRN Eric Pompa M.D. 150 mL/hr at 06/30/20 1513 12.5 g at 06/30/20 1513   • Respiratory Therapy Consult   Nebulization Continuous RT Roger Florence Jr., D.O.       • DULoxetine (CYMBALTA) capsule 60 mg  60 mg Oral DAILY Roger Folrence Jr., D.O.   60 mg at 07/10/20 0507   • hydrOXYzine HCl (ATARAX) tablet 25 mg  25 mg Oral TID PRN Roger Florence Jr. D.O.   25 mg at 07/03/20 0027   • levothyroxine (SYNTHROID) tablet 75 mcg  75 mcg Oral AM ES Roger Florence Jr. D.O.   75 mcg at 07/10/20 0507   • lidocaine (LIDODERM) 5 % 1 Patch  1 Patch Transdermal Q24HRS PRN Roger Florence Jr., D.O.   1 Patch at 07/04/20 2211   • traZODone (DESYREL) tablet 300 mg  300 mg Oral BID Roger Florence Jr. D.O.   300 mg at 07/10/20 0507   • rosuvastatin (CRESTOR) tablet 10 mg  10 mg Oral Q EVENING Roger Florence Jr., D.O.   10 mg at 07/09/20 1714   • senna-docusate (PERICOLACE or SENOKOT S) 8.6-50 MG per tablet 2 Tab  2 Tab Oral BID Roger Florence Jr., D.O.   Stopped at 07/10/20 0600    And   • polyethylene glycol/lytes (MIRALAX) PACKET 1 Packet  1 Packet Oral QDAY PRN Roger Florence Jr., D.O.   1 Packet at 07/02/20 0500    And   • magnesium hydroxide (MILK OF MAGNESIA) suspension 30 mL  30 mL Oral QDAY PRN Roger Florence Jr., D.O.        And   • bisacodyl (DULCOLAX) suppository 10 mg  10 mg Rectal QDAY PRN Roger Florence Jr., D.O.       • [Held by provider] heparin  injection 5,000 Units  5,000 Units Subcutaneous Q8HRS ROBY Sarmiento Jr..OMelonie   5,000 Units at 07/01/20 0540   • ondansetron (ZOFRAN) syringe/vial injection 4 mg  4 mg Intravenous Q4HRS PRN Roger Florence Jr., D.O.       • ondansetron (ZOFRAN ODT) dispertab 4 mg  4 mg Oral Q4HRS PRN Roger Florence Jr., D.O.       • Pharmacy Consult Request ...Pain Management Review 1 Each  1 Each Other PHARMACY TO DOSE ROBY Sarmiento Jr..LANCE.       • lactated ringers infusion (BOLUS)  500 mL Intravenous Once PRN Roger Florence Jr., D.O.       • umeclidinium-vilanterol (ANORO ELLIPTA) inhaler 1 Puff  1 Puff Inhalation QDAILY (RT) Roger Florence Jr., D.O.   1 Puff at 07/10/20 0754   • fluticasone (FLOVENT HFA) 44 MCG/ACT inhaler 88 mcg  2 Puff Inhalation DAILY ROBY Sarmiento Jr..O.   88 mcg at 07/10/20 0600   • ipratropium-albuterol (DUONEB) nebulizer solution  3 mL Nebulization Q2HRS PRN (RT) Heber Armstrong M.D.   3 mL at 07/02/20 0317   • heparin injection 2,400 Units  2,400 Units Intracatheter ACUTE DIALYSIS PRN Eric Pompa M.D.   2,400 Units at 07/08/20 1648   • insulin regular (HUMULIN R) injection 1-6 Units  1-6 Units Subcutaneous 4X/DAY ACHS Bob Glover M.D.   1 Units at 07/10/20 1135    And   • glucose 4 g chewable tablet 16 g  16 g Oral Q15 MIN PRN Bob Glover M.D.        And   • dextrose 50% (D50W) injection 50 mL  50 mL Intravenous Q15 MIN PRN Bob Glover M.D.             Assessment/Plan  69 y.o. female with a history of CKD 3, COPD who presented 6/30/2020 with shortness of breath, found to have bradycardia, hypotension, and HAN.     1.  HAN on CKD stage III - good UOP, holding dialysis-possible recovery    2.  HTN: BP well controlled     3.  Volume: added lasix    4.  Hyponatremia:improving- avoid hypotonic fluids/free water     5.  Anemia: drop in Hb level -to monitor     6.  Cardiogenic shock, from hemodynamically significant bradycardia -improved -clinically stable     Recs: to monitor  for recovery from HAN             No need for emergent dialysis today             Will reassess for HD needs AM             Low Na diet             Daily labs

## 2020-07-11 LAB
ALBUMIN SERPL BCP-MCNC: 2.8 G/DL (ref 3.2–4.9)
ALBUMIN/GLOB SERPL: 1.2 G/DL
ALP SERPL-CCNC: 61 U/L (ref 30–99)
ALT SERPL-CCNC: 63 U/L (ref 2–50)
ANION GAP SERPL CALC-SCNC: 11 MMOL/L (ref 7–16)
AST SERPL-CCNC: 27 U/L (ref 12–45)
BASOPHILS # BLD AUTO: 0.2 % (ref 0–1.8)
BASOPHILS # BLD: 0.02 K/UL (ref 0–0.12)
BILIRUB SERPL-MCNC: 0.5 MG/DL (ref 0.1–1.5)
BUN SERPL-MCNC: 42 MG/DL (ref 8–22)
CALCIUM SERPL-MCNC: 8.4 MG/DL (ref 8.5–10.5)
CHLORIDE SERPL-SCNC: 98 MMOL/L (ref 96–112)
CO2 SERPL-SCNC: 25 MMOL/L (ref 20–33)
CREAT SERPL-MCNC: 2.46 MG/DL (ref 0.5–1.4)
EOSINOPHIL # BLD AUTO: 0.18 K/UL (ref 0–0.51)
EOSINOPHIL NFR BLD: 1.8 % (ref 0–6.9)
ERYTHROCYTE [DISTWIDTH] IN BLOOD BY AUTOMATED COUNT: 50.8 FL (ref 35.9–50)
GLOBULIN SER CALC-MCNC: 2.4 G/DL (ref 1.9–3.5)
GLUCOSE BLD-MCNC: 126 MG/DL (ref 65–99)
GLUCOSE BLD-MCNC: 174 MG/DL (ref 65–99)
GLUCOSE BLD-MCNC: 204 MG/DL (ref 65–99)
GLUCOSE BLD-MCNC: 89 MG/DL (ref 65–99)
GLUCOSE SERPL-MCNC: 132 MG/DL (ref 65–99)
HCT VFR BLD AUTO: 24.9 % (ref 37–47)
HGB BLD-MCNC: 7.9 G/DL (ref 12–16)
IMM GRANULOCYTES # BLD AUTO: 0.08 K/UL (ref 0–0.11)
IMM GRANULOCYTES NFR BLD AUTO: 0.8 % (ref 0–0.9)
LYMPHOCYTES # BLD AUTO: 0.78 K/UL (ref 1–4.8)
LYMPHOCYTES NFR BLD: 8 % (ref 22–41)
MCH RBC QN AUTO: 30.7 PG (ref 27–33)
MCHC RBC AUTO-ENTMCNC: 31.7 G/DL (ref 33.6–35)
MCV RBC AUTO: 96.9 FL (ref 81.4–97.8)
MONOCYTES # BLD AUTO: 0.89 K/UL (ref 0–0.85)
MONOCYTES NFR BLD AUTO: 9.1 % (ref 0–13.4)
NEUTROPHILS # BLD AUTO: 7.79 K/UL (ref 2–7.15)
NEUTROPHILS NFR BLD: 80.1 % (ref 44–72)
NRBC # BLD AUTO: 0 K/UL
NRBC BLD-RTO: 0 /100 WBC
PLATELET # BLD AUTO: 185 K/UL (ref 164–446)
PMV BLD AUTO: 8.7 FL (ref 9–12.9)
POTASSIUM SERPL-SCNC: 4.9 MMOL/L (ref 3.6–5.5)
PROT SERPL-MCNC: 5.2 G/DL (ref 6–8.2)
RBC # BLD AUTO: 2.57 M/UL (ref 4.2–5.4)
SODIUM SERPL-SCNC: 134 MMOL/L (ref 135–145)
WBC # BLD AUTO: 9.7 K/UL (ref 4.8–10.8)

## 2020-07-11 PROCEDURE — 700102 HCHG RX REV CODE 250 W/ 637 OVERRIDE(OP): Performed by: HOSPITALIST

## 2020-07-11 PROCEDURE — A9270 NON-COVERED ITEM OR SERVICE: HCPCS | Performed by: INTERNAL MEDICINE

## 2020-07-11 PROCEDURE — 700102 HCHG RX REV CODE 250 W/ 637 OVERRIDE(OP): Performed by: INTERNAL MEDICINE

## 2020-07-11 PROCEDURE — 99232 SBSQ HOSP IP/OBS MODERATE 35: CPT | Performed by: INTERNAL MEDICINE

## 2020-07-11 PROCEDURE — 82962 GLUCOSE BLOOD TEST: CPT

## 2020-07-11 PROCEDURE — 85025 COMPLETE CBC W/AUTO DIFF WBC: CPT

## 2020-07-11 PROCEDURE — 770020 HCHG ROOM/CARE - TELE (206)

## 2020-07-11 PROCEDURE — A9270 NON-COVERED ITEM OR SERVICE: HCPCS | Performed by: HOSPITALIST

## 2020-07-11 PROCEDURE — 80053 COMPREHEN METABOLIC PANEL: CPT

## 2020-07-11 PROCEDURE — 94640 AIRWAY INHALATION TREATMENT: CPT

## 2020-07-11 RX ORDER — FLUTICASONE PROPIONATE 44 UG/1
2 AEROSOL, METERED RESPIRATORY (INHALATION)
Status: DISCONTINUED | OUTPATIENT
Start: 2020-07-11 | End: 2020-07-22 | Stop reason: HOSPADM

## 2020-07-11 RX ADMIN — UMECLIDINIUM BROMIDE AND VILANTEROL TRIFENATATE 1 PUFF: 62.5; 25 POWDER RESPIRATORY (INHALATION) at 08:33

## 2020-07-11 RX ADMIN — AMLODIPINE BESYLATE 5 MG: 5 TABLET ORAL at 05:18

## 2020-07-11 RX ADMIN — LINEZOLID 600 MG: 600 TABLET, FILM COATED ORAL at 05:17

## 2020-07-11 RX ADMIN — LEVOTHYROXINE SODIUM 75 MCG: 75 TABLET ORAL at 05:18

## 2020-07-11 RX ADMIN — FUROSEMIDE 20 MG: 20 TABLET ORAL at 05:18

## 2020-07-11 RX ADMIN — ROSUVASTATIN CALCIUM 10 MG: 20 TABLET, FILM COATED ORAL at 17:35

## 2020-07-11 RX ADMIN — LINEZOLID 600 MG: 600 TABLET, FILM COATED ORAL at 17:35

## 2020-07-11 RX ADMIN — TRAZODONE HYDROCHLORIDE 300 MG: 150 TABLET ORAL at 05:18

## 2020-07-11 RX ADMIN — OXYCODONE HYDROCHLORIDE 10 MG: 10 TABLET ORAL at 20:53

## 2020-07-11 RX ADMIN — DULOXETINE HYDROCHLORIDE 60 MG: 60 CAPSULE, DELAYED RELEASE ORAL at 05:18

## 2020-07-11 RX ADMIN — OXYCODONE HYDROCHLORIDE 10 MG: 10 TABLET ORAL at 13:35

## 2020-07-11 RX ADMIN — INSULIN HUMAN 2 UNITS: 100 INJECTION, SOLUTION PARENTERAL at 12:23

## 2020-07-11 RX ADMIN — DOCUSATE SODIUM 50 MG AND SENNOSIDES 8.6 MG 2 TABLET: 8.6; 5 TABLET, FILM COATED ORAL at 17:34

## 2020-07-11 RX ADMIN — INSULIN HUMAN 1 UNITS: 100 INJECTION, SOLUTION PARENTERAL at 20:57

## 2020-07-11 RX ADMIN — FLUTICASONE PROPIONATE 88 MCG: 44 AEROSOL, METERED RESPIRATORY (INHALATION) at 08:33

## 2020-07-11 RX ADMIN — TRAZODONE HYDROCHLORIDE 300 MG: 150 TABLET ORAL at 17:34

## 2020-07-11 RX ADMIN — OXYCODONE HYDROCHLORIDE 10 MG: 10 TABLET ORAL at 05:25

## 2020-07-11 ASSESSMENT — ENCOUNTER SYMPTOMS
FLANK PAIN: 0
HEMOPTYSIS: 0
VOMITING: 0
SHORTNESS OF BREATH: 0
DIZZINESS: 0
ORTHOPNEA: 0
EYES NEGATIVE: 1
SORE THROAT: 0
WEIGHT LOSS: 0
DEPRESSION: 0
NECK PAIN: 0
FEVER: 0
COUGH: 0
PALPITATIONS: 0
WEAKNESS: 1
FOCAL WEAKNESS: 0
WHEEZING: 0
ABDOMINAL PAIN: 0
HEARTBURN: 0
CHILLS: 0
CLAUDICATION: 0
LOSS OF CONSCIOUSNESS: 0
SINUS PAIN: 0
NAUSEA: 0
HEADACHES: 0

## 2020-07-11 ASSESSMENT — FIBROSIS 4 INDEX
FIB4 SCORE: 1.29
FIB4 SCORE: 1.29

## 2020-07-11 NOTE — CARE PLAN
Problem: Safety  Goal: Will remain free from injury  Outcome: PROGRESSING AS EXPECTED  Goal: Will remain free from falls  Outcome: PROGRESSING AS EXPECTED     Problem: Bowel/Gastric:  Goal: Normal bowel function is maintained or improved  Outcome: PROGRESSING AS EXPECTED  Goal: Will not experience complications related to bowel motility  Outcome: PROGRESSING AS EXPECTED     Problem: Pain Management  Goal: Pain level will decrease to patient's comfort goal  Outcome: PROGRESSING AS EXPECTED     Problem: Respiratory:  Goal: Respiratory status will improve  Outcome: PROGRESSING AS EXPECTED

## 2020-07-11 NOTE — PROGRESS NOTES
Received bedside report from RN, pt care assumed, VSS, pt assessment complete. Pt AAOx4, 5/10 pain at this time(chronic arthritis). No signs of acute distress noted at this time. POC discussed with pt and verbalizes no questions. Pt denies any additional needs at this time. Bed in lowest position, pt educated on fall risk and verbalized understanding, call light within reach, hourly rounding initiated.

## 2020-07-11 NOTE — PROGRESS NOTES
Hospital Medicine Daily Progress Note    Date of Service  7/11/2020    Chief Complaint  69 y.o. female admitted 6/30/2020 with junctional bradycardia rate 37 with cardiogenic shock, SOB.    Hospital Course    7/2:  This 70 yo female admitted by critical care to ICU for HR 37 junctional rhythm, COPD on 4 LPM NC at home, DM, hypothyroidism, hepatitis C, HTN, CHF, CKD stage 4 followed by Dr. Nava presented with worsening SOB, hypoxia and cough.  She was recently admitted 6/15/20 for COPD exacerbation and pulmonary edema, given amlodopine, metoprolol and lasix.   COVID 19 negative x3.  K was elevated 6.3.  Nephrology consulted and started HD with improvement of HR, BP and SOB.  3 HD sessions performed including today with 2 L removed.  She is feeling better, but still swollen in extremities, on 4 LPM NC baseline O2, ready for dc out of ICU.  Her BP is currently controlled at 97/69, no BP meds given at this point.  K normalized and Cr imporved.  Wbc increased to 23K on IV steroids.  I have started oral taper from solumedrol 60 IV q 12 to 40mg po daily.  It is unclear why patient was started on Rocephin on admission.  I have ordered a urine culture, no UA on admission or culture.  No evidence for pneumonia.  7/3:  Patient still with orthopnea, but no wheeze, wet cough noted today.  No HD today, waiting until tomorrow.  Has temporary triple lumen RIJ.  Wbc decreasing, Cr improving 3.24 to 2.58.  EF 80% but RVP elevated 55-60 with moderate mitral regurg noted. SR 76-92.  7/4:  Remains NSR on monitor.  No SOB, had 1.5 L removed during HD today.  Cr improving, urine output improving daily.  States having productive sputum thick brown, ordered sputum culture.  Lungs CTA b/l.  7/5:  No change, continues on baseline O2 4 LPM NC, no wheeze or crackles.  7/6:  C/o productive sputum, had 5 days of Rocephin, zithromax completed.  Ordered sputum culture, sent today with thick brown.  Wbc increased last 2 days, repeat CXR no  infiltrate, improved pulmonary edema since 6/30 CXR.  + VRE on urine culture, started zyvox bid x 7d ays.*    7/7: Patient reports making 250 mL urine output  Denies any dysuria  Plan for hemodialysis today  On Zyvox  She reports generalized weakness, will likely need placement, from assisted living facility    7/8 we will monitor ins and outs closely  Discussed with nephrology, Dr. Mccabe  Denies dysuria  Generalized weakness, requiring 1 person assist  Encourage activity  Discussed with RN  Baseline oxygen needs of 4 L, will taper as tolerated  Anemia requiring transfusion, will monitor closely  Reports lethargy today    7/9 no new complaints  Patient reports urine output  Per nephrology, will start trial of Lasix  Hemodialysis per nephrology    7/10 improving shortness of breath  Improving urinary output of greater than 1200 mL's  Dialysis per nephrology  Encourage activity    7/11 SBA per staff  Poor urine output, 400 ml/24 hours    Consultants/Specialty  Dr. Pompa  Critical care    Code Status  DNAR, I ok    Disposition   getting HD per nephrology   Hep C +, no treatment yet, patient understands the benefits of treatment and will need to pursue outpatient tx.  Resides at [facility].  OT/PT no needs.  Baseline O2 5 LPM NC.    snf referral vs [facility] with home health, needs transport assistance to hemodialysis center 3 times a week   to assist  Monitor strict ins and outs  Taper oxygen  Out of bed twice daily  Zyvox through July 13    Out of bed for meals    Poor urine output, HD per nephrology    Review of Systems  Review of Systems   Constitutional: Negative for chills, fever and malaise/fatigue.   HENT: Negative for sore throat.    Respiratory: Negative for cough and shortness of breath.    Cardiovascular: Positive for leg swelling. Negative for chest pain and claudication.   Gastrointestinal: Negative for abdominal pain, heartburn, melena and nausea.   Genitourinary: Negative for dysuria and flank pain.    Musculoskeletal: Negative for neck pain.   Neurological: Positive for weakness. Negative for dizziness, focal weakness, loss of consciousness and headaches.   Psychiatric/Behavioral: Negative for depression.        Physical Exam  Temp:  [36.6 °C (97.8 °F)-37 °C (98.6 °F)] 36.9 °C (98.4 °F)  Pulse:  [63-82] 68  Resp:  [16-20] 20  BP: (130-179)/(48-65) 138/52  SpO2:  [91 %-98 %] 98 %    Physical Exam  Vitals signs and nursing note reviewed.   Constitutional:       General: She is not in acute distress.     Appearance: She is well-developed. She is not ill-appearing or toxic-appearing.   HENT:      Head: Normocephalic and atraumatic.      Nose: Nose normal.      Mouth/Throat:      Pharynx: No oropharyngeal exudate.   Eyes:      Extraocular Movements: Extraocular movements intact.      Pupils: Pupils are equal, round, and reactive to light.   Neck:      Musculoskeletal: Normal range of motion and neck supple.      Thyroid: No thyromegaly.      Vascular: No JVD.      Trachea: No tracheal deviation.   Cardiovascular:      Rate and Rhythm: Normal rate and regular rhythm.      Pulses: Normal pulses.   Pulmonary:      Effort: Pulmonary effort is normal. No respiratory distress.      Breath sounds: No wheezing.      Comments: cta b/l  Abdominal:      General: Bowel sounds are normal. There is no distension.      Palpations: Abdomen is soft.   Musculoskeletal: Normal range of motion.         General: Swelling present. No tenderness.      Right lower leg: Edema present.      Left lower leg: Edema present.   Skin:     General: Skin is warm and dry.      Coloration: Skin is not pale.   Neurological:      Mental Status: She is alert and oriented to person, place, and time.      Cranial Nerves: No cranial nerve deficit.      Motor: Weakness present. No abnormal muscle tone.      Gait: Gait normal.   Psychiatric:         Behavior: Behavior normal.         Thought Content: Thought content normal.         Fluids    Intake/Output  Summary (Last 24 hours) at 7/11/2020 1330  Last data filed at 7/11/2020 1030  Gross per 24 hour   Intake 360 ml   Output 850 ml   Net -490 ml       Laboratory  Recent Labs     07/09/20  0504 07/10/20  0355 07/11/20  0330   WBC 10.4 9.4 9.7   RBC 2.97* 2.66* 2.57*   HEMOGLOBIN 9.1* 8.2* 7.9*   HEMATOCRIT 28.4* 25.9* 24.9*   MCV 95.6 97.4 96.9   MCH 30.6 30.8 30.7   MCHC 32.0* 31.7* 31.7*   RDW 50.6* 50.3* 50.8*   PLATELETCT 188 194 185   MPV 8.4* 8.6* 8.7*     Recent Labs     07/09/20  0504 07/10/20  0355 07/11/20  0330   SODIUM 130* 131* 134*   POTASSIUM 4.5 4.6 4.9   CHLORIDE 93* 95* 98   CO2 26 27 25   GLUCOSE 144* 162* 132*   BUN 37* 40* 42*   CREATININE 2.07* 2.19* 2.46*   CALCIUM 8.3* 8.3* 8.4*                   Imaging  DX-CHEST-PORTABLE (1 VIEW)   Final Result         No significant interval change.      Unchanged diffuse interstitial prominence.      EC-ECHOCARDIOGRAM COMPLETE W/O CONT   Final Result      US-RUQ   Final Result      Cholelithiasis with mild gallbladder wall thickening but no positive sonographic Troncoso sign. This could indicate chronic cholecystitis or incomplete distention      DX-CHEST-PORTABLE (1 VIEW)   Final Result      1.  Satisfactory position of new right IJV multilumen dialysis catheter. No pneumothorax identified.      2.  Unchanged mild volume overload pattern.      DX-CHEST-PORTABLE (1 VIEW)   Final Result      No acute cardiac or pulmonary abnormality is noted. Stable cardiomegaly with increased interstitial opacity diffusely.           Assessment/Plan  Hyperkalemia- (present on admission)  Assessment & Plan  Resolved with HD.    Acute renal failure superimposed on stage 4 chronic kidney disease (HCC)- (present on admission)  Assessment & Plan  Had recent admission, given lasix at discharge and new BP meds, metoprolol and norvasc.  HR 37 and low BP on admission.  Had HD emergently x 3 with improvement of SOB, 1 L off yesterday, 2 L off today.  Dr. Pompa following for HD ongoing  determination, for now plans on TTS schedule.  Will need temporary HD catheter placed prior to dc and HD chair set up.  Patient states she gets better, then goes back to SOB for several months off and on.       7/7 HD per nephro, monitoring for renal recovery  7/8  S/p HD, ?outpatient HD, nephro to arrange  Need to arrange ride to HD 3x/weekly from [facility] vs snf  CM to assist  - encourage activity, OOB bid  On 5L O2    7/9 s/p HD (7/8) with hypotension, now resovled  - HD per nephrology  Improving BNP    7/10 bp stable, UOP 1250 ml  nephro following, ?diuretics vs ongoing HD  Appreciate input    7/11 on lasix per neprhology  Decreased u.o 400 ml/24 hours  - monitor  HD per neprho    Pneumonia due to infectious organism  Assessment & Plan  Had been treated with 5 days rocephin, 3 days zithromax since admission.  CXR improved edema, no definite infiltrate to suggest pneumonia  C/o productive sputum, ordered sputum culture, hold on abx for now.    Leukocytosis- (present on admission)  Assessment & Plan  2/2 steroids.    Acute exacerbation of chronic obstructive pulmonary disease (COPD) (HCC)- (present on admission)  Assessment & Plan  Patient admitted with COPD exacerbation  7/2 tapering solumedrol IV to po prednisone 40 mg daily x 4 days.  7/4:  Sputum culture ordered for productive cough.  7/10: 99 on 5L, taper to baseline needs as tolerated    DM type 2, uncontrolled, with renal complications (HCC)- (present on admission)  Assessment & Plan  SSI  Diabetic, renal diet.    UTI (urinary tract infection) due to Enterococcus  Assessment & Plan  VRE on urine culture ss zyvox  Increasing wbc daily.  7/6:  Treat zyvox x 7 days.    7/7 ongoing wbc  F/u am labs    7/8 improving wbc  Cont zyvox 7/13    Renovascular hypertension  Assessment & Plan  Ordered PRN BP meds for sbp >160 or DBP >90 hydralazine.  Monitor  Avoid BB since she went into cardiogenic shock 2/2 bradycardia.    Chronic hepatitis C without hepatic coma (HCC)-  (present on admission)  Assessment & Plan  Will need outpatient treatment.  Elevated LFTs.    Junctional bradycardia- (present on admission)  Assessment & Plan  Combination of hyperkalemia, acute on CKD4 and metoprolol, norvasc  Now resolved s/p HD.    Acute on chronic respiratory failure with hypoxia (HCC)- (present on admission)  Assessment & Plan  Initially admitted to ICU for respiratory support.  No intubation is noted in the record.    Anemia- (present on admission)  Assessment & Plan  2/2 CKD stage 4.    7/8 hgb 6.6, plan for transfusion today  - f/u am cbc, monitor    7/9 s/p 2 u prbc, hgb 9  F/u am labs    7/10 monitor cbc  Significant hypotension noted with anemia and HD, s/p transfusion  hgb 9 to 8, monitor    7/11 hgb 8 to 7.9, monitor  F/u am labs    Pulmonary hypertension (HCC)- (present on admission)  Assessment & Plan  EF 80%  RVP elevated 55-60%.  Likely will need some diuretic for pulmonary edema.  Currently on HD with 2 L removed on 7/3.      Tobacco dependence- (present on admission)  Assessment & Plan  Recommend cessation    Chronic use of opiate drug for therapeutic purpose- (present on admission)  Assessment & Plan  On oxycodone 10 mg tid at home, continued in hospital    EDITH (obstructive sleep apnea)- (present on admission)  Assessment & Plan  ongoing    Hypothyroidism- (present on admission)  Assessment & Plan  Continue home meds.    Essential hypertension- (present on admission)  Assessment & Plan  7/9 uncontrolled, with intermittent hypotension with HD  Monitor closely, add norvasc with holding parameters       VTE prophylaxis: heparin

## 2020-07-11 NOTE — PROGRESS NOTES
Nephrology Daily Progress Note    Date of Service  7/11/2020    Chief Complaint  69 y.o. female with a history of CKD 3, COPD who presented 6/30/2020 with shortness of breath, found to have bradycardia, hypotension, and HAN.    Interval Problem Update  7/1 -patient had dialysis yesterday with no fluid removed, and resolution of shock on completion of dialysis.  Patient had 500 mL of urine output documented last 24 hours.  Patient had dialysis again this morning with 1 L removed.  Patient says her breathing is better.  Denies chest pain, shortness of breath at this time.  7/3 -patient had third session of dialysis yesterday with 2 L removed.  Tolerated well.  Denies chest pain this morning.  Complains of continued shortness of breath, but improved from admission.  7/5 -patient had dialysis yesterday with 1.5 L removed.  Patient says she is urinating, but no urine output being recorded.  Patient says her shortness of breath is still there, but better compared to admission.  Patient denies chest pain.  7/6 -doing better, less SOB  Scheduled for HD TTS  7/9 -doing well, improved SOB  UOP 1500 cc/24 H  Holding dialysis -watching for recovery  7/10 -doing well  Edema improving  SOB better  Good UOP  Holding dialysis  7/11 -doing well, no complaints  SOB and edema improved  Good UOP  Off HD  Review of Systems  Review of Systems   Constitutional: Negative for chills, fever, malaise/fatigue and weight loss.   HENT: Negative for congestion, hearing loss and sinus pain.    Eyes: Negative.    Respiratory: Negative for cough, hemoptysis, shortness of breath and wheezing.    Cardiovascular: Negative for chest pain, palpitations, orthopnea and leg swelling.   Gastrointestinal: Negative for abdominal pain, nausea and vomiting.   Genitourinary: Negative for dysuria, flank pain, frequency, hematuria and urgency.   Skin: Negative.    All other systems reviewed and are negative.       Physical Exam  Temp:  [36.6 °C (97.8 °F)-37 °C  (98.6 °F)] 36.9 °C (98.4 °F)  Pulse:  [63-82] 68  Resp:  [16-20] 20  BP: (130-148)/(48-65) 138/52  SpO2:  [91 %-98 %] 98 %    Physical Exam   Constitutional: She is oriented to person, place, and time. She appears well-developed and well-nourished. No distress.   HENT:   Head: Normocephalic and atraumatic.   Nose: Nose normal.   Mouth/Throat: Oropharynx is clear and moist.   Eyes: Pupils are equal, round, and reactive to light. Conjunctivae and EOM are normal.   Neck: Normal range of motion. Neck supple. No tracheal deviation present. No thyromegaly present.   Cardiovascular: Normal rate and regular rhythm. Exam reveals no gallop and no friction rub.   Pulmonary/Chest: Effort normal and breath sounds normal. No respiratory distress. She has no wheezes. She has no rales.   Abdominal: Soft. Bowel sounds are normal. She exhibits no distension and no mass. There is no abdominal tenderness. There is no guarding.   Musculoskeletal:         General: Edema present.   Neurological: She is alert and oriented to person, place, and time. No cranial nerve deficit.   Skin: Skin is warm. No rash noted. No erythema.   Nursing note and vitals reviewed.  Access: Right IJ temporary dialysis catheter.      Fluids    Intake/Output Summary (Last 24 hours) at 7/11/2020 1653  Last data filed at 7/11/2020 1330  Gross per 24 hour   Intake 360 ml   Output 1050 ml   Net -690 ml       Laboratory  Labs reviewed, pertinent labs below.  Recent Labs     07/09/20  0504 07/10/20  0355 07/11/20  0330   WBC 10.4 9.4 9.7   RBC 2.97* 2.66* 2.57*   HEMOGLOBIN 9.1* 8.2* 7.9*   HEMATOCRIT 28.4* 25.9* 24.9*   MCV 95.6 97.4 96.9   MCH 30.6 30.8 30.7   MCHC 32.0* 31.7* 31.7*   RDW 50.6* 50.3* 50.8*   PLATELETCT 188 194 185   MPV 8.4* 8.6* 8.7*     Recent Labs     07/09/20  0504 07/10/20  0355 07/11/20  0330   SODIUM 130* 131* 134*   POTASSIUM 4.5 4.6 4.9   CHLORIDE 93* 95* 98   CO2 26 27 25   GLUCOSE 144* 162* 132*   BUN 37* 40* 42*   CREATININE 2.07* 2.19*  2.46*   CALCIUM 8.3* 8.3* 8.4*               URINALYSIS:  Lab Results   Component Value Date/Time    COLORURINE DK Yellow 06/15/2019 0245    CLARITY Turbid (A) 06/15/2019 0245    SPECGRAVITY 1.020 06/15/2019 0245    PHURINE 5.0 06/15/2019 0245    KETONES Trace (A) 06/15/2019 0245    PROTEINURIN 300 (A) 06/15/2019 0245    BILIRUBINUR Negative 06/15/2019 0245    UROBILU 1.0 06/15/2019 0245    NITRITE Negative 06/15/2019 0245    LEUKESTERAS Negative 06/15/2019 0245    OCCULTBLOOD Negative 06/15/2019 0245     UPC  No results found for: TOTPROTUR No results found for: CREATININEU      Imaging reviewed  DX-CHEST-PORTABLE (1 VIEW)   Final Result         No significant interval change.      Unchanged diffuse interstitial prominence.      EC-ECHOCARDIOGRAM COMPLETE W/O CONT   Final Result      US-RUQ   Final Result      Cholelithiasis with mild gallbladder wall thickening but no positive sonographic Troncoso sign. This could indicate chronic cholecystitis or incomplete distention      DX-CHEST-PORTABLE (1 VIEW)   Final Result      1.  Satisfactory position of new right IJV multilumen dialysis catheter. No pneumothorax identified.      2.  Unchanged mild volume overload pattern.      DX-CHEST-PORTABLE (1 VIEW)   Final Result      No acute cardiac or pulmonary abnormality is noted. Stable cardiomegaly with increased interstitial opacity diffusely.            Current Facility-Administered Medications   Medication Dose Route Frequency Provider Last Rate Last Dose   • fluticasone (FLOVENT HFA) 44 MCG/ACT inhaler 88 mcg  2 Puff Inhalation QDAILY (RT) Roger Florence Jr., D.OMelonie   88 mcg at 07/11/20 0833   • furosemide (LASIX) tablet 20 mg  20 mg Oral Q DAY Rosi Bansal M.D.   20 mg at 07/11/20 0518   • amLODIPine (NORVASC) tablet 5 mg  5 mg Oral Q DAY Valorie Hemphill D.O.   5 mg at 07/11/20 0518   • heparin injection 1,500 Units  1,500 Units Intravenous DIALYSIS PRN Rosi Bansal M.D.   1,500 Units at 07/08/20 1430   •  ipratropium-albuterol (DUONEB) nebulizer solution  3 mL Nebulization Q2HRS PRN (RT) Christina Calderon M.D.       • hydrALAZINE (APRESOLINE) tablet 25 mg  25 mg Oral Q8HRS PRN Christina Calderon M.D.   25 mg at 07/10/20 1647   • linezolid (ZYVOX) tablet 600 mg  600 mg Oral Q12HRS Christina Calderon M.D.   600 mg at 07/11/20 0517   • heparin injection 2,000 Units  2,000 Units Intravenous DIALYSIS PRN Eric Pompa M.D.   2,000 Units at 07/07/20 1439   • oxyCODONE immediate release (ROXICODONE) tablet 10 mg  10 mg Oral TID PRN Christina Calderon M.D.   10 mg at 07/11/20 1335   • lactulose 20 GM/30ML solution 30 mL  30 mL Oral BID W/MEALS PRN Heber Armstrong M.D.       • NS (BOLUS) infusion 250 mL  250 mL Intravenous DIALYSIS PRN Eric Pompa M.D.       • albumin human 25% solution 12.5 g  12.5 g Intravenous DIALYSIS PRN Eric Pompa M.D. 150 mL/hr at 06/30/20 1513 12.5 g at 06/30/20 1513   • Respiratory Therapy Consult   Nebulization Continuous RT Roger Florence Jr., D.O.       • DULoxetine (CYMBALTA) capsule 60 mg  60 mg Oral DAILY Roger Florence Jr., D.O.   60 mg at 07/11/20 0518   • hydrOXYzine HCl (ATARAX) tablet 25 mg  25 mg Oral TID PRN Roger Florence Jr., D.O.   25 mg at 07/10/20 1642   • levothyroxine (SYNTHROID) tablet 75 mcg  75 mcg Oral AM ES Roger Florence Jr., D.O.   75 mcg at 07/11/20 0518   • lidocaine (LIDODERM) 5 % 1 Patch  1 Patch Transdermal Q24HRS PRN Roger Florence Jr., D.O.   1 Patch at 07/04/20 2211   • traZODone (DESYREL) tablet 300 mg  300 mg Oral BID Roger Florence Jr., D.O.   300 mg at 07/11/20 0518   • rosuvastatin (CRESTOR) tablet 10 mg  10 mg Oral Q EVENING Roger Florence Jr., D.O.   10 mg at 07/10/20 1641   • senna-docusate (PERICOLACE or SENOKOT S) 8.6-50 MG per tablet 2 Tab  2 Tab Oral BID ROBY Sarmiento Jr..O.   Stopped at 07/10/20 0600    And   • polyethylene glycol/lytes (MIRALAX) PACKET 1 Packet  1 Packet Oral QDAY PRN Roger Florence Jr., D.O.   1 Packet at 07/02/20 0500    And   •  magnesium hydroxide (MILK OF MAGNESIA) suspension 30 mL  30 mL Oral QDAY PRN GHULAM Sarmiento Jr.OMelonie        And   • bisacodyl (DULCOLAX) suppository 10 mg  10 mg Rectal QDAY PRN ROBY Sarmiento Jr..O.       • [Held by provider] heparin injection 5,000 Units  5,000 Units Subcutaneous Q8HRS ROBY Sarmiento Jr..O.   Stopped at 07/10/20 2200   • ondansetron (ZOFRAN) syringe/vial injection 4 mg  4 mg Intravenous Q4HRS PRN ROBY Sarmiento Jr..O.       • ondansetron (ZOFRAN ODT) dispertab 4 mg  4 mg Oral Q4HRS PRN ROBY Sarmiento Jr..O.       • Pharmacy Consult Request ...Pain Management Review 1 Each  1 Each Other PHARMACY TO DOSE ROBY Sarmiento Jr..LANCE.       • lactated ringers infusion (BOLUS)  500 mL Intravenous Once PRN ROBY Sarmiento Jr..O.       • umeclidinium-vilanterol (ANORO ELLIPTA) inhaler 1 Puff  1 Puff Inhalation QDAILY (RT) ROBY Sarmiento Jr..O.   1 Puff at 07/11/20 0833   • ipratropium-albuterol (DUONEB) nebulizer solution  3 mL Nebulization Q2HRS PRN (RT) Heber Armstrong M.D.   3 mL at 07/02/20 0317   • heparin injection 2,400 Units  2,400 Units Intracatheter ACUTE DIALYSIS PRN Eric Pompa M.D.   2,400 Units at 07/08/20 1648   • insulin regular (HUMULIN R) injection 1-6 Units  1-6 Units Subcutaneous 4X/DAY ACHLUIS Glover M.D.   2 Units at 07/11/20 1223    And   • glucose 4 g chewable tablet 16 g  16 g Oral Q15 MIN PRN Bob Glover M.D.        And   • dextrose 50% (D50W) injection 50 mL  50 mL Intravenous Q15 MIN PRN Bob Glover M.D.             Assessment/Plan  69 y.o. female with a history of CKD 3, COPD who presented 6/30/2020 with shortness of breath, found to have bradycardia, hypotension, and HAN.     1.  HAN on CKD stage III - good UOP, holding dialysis- recovering    2.  HTN: BP well controlled     3.  Volume: well controlled -on lasix    4.  Hyponatremia:improving- avoid hypotonic fluids/free water     5.  Anemia: drop in Hb level -to monitor     6.   Cardiogenic shock, from hemodynamically significant bradycardia -improved -clinically stable     Recs: to monitor for recovery from HAN             No need for emergent dialysis today             Holding dialysis for now             Low Na diet             Daily labs             Avoid nephrotoxins             Will follow

## 2020-07-11 NOTE — CARE PLAN
Problem: Safety  Goal: Will remain free from injury  Outcome: PROGRESSING AS EXPECTED  Goal: Will remain free from falls  Outcome: PROGRESSING AS EXPECTED     Problem: Infection  Goal: Will remain free from infection  Outcome: PROGRESSING AS EXPECTED     Problem: Venous Thromboembolism (VTW)/Deep Vein Thrombosis (DVT) Prevention:  Goal: Patient will participate in Venous Thrombosis (VTE)/Deep Vein Thrombosis (DVT)Prevention Measures  Outcome: PROGRESSING AS EXPECTED     Problem: Bowel/Gastric:  Goal: Normal bowel function is maintained or improved  Outcome: PROGRESSING AS EXPECTED

## 2020-07-12 LAB
ALBUMIN SERPL BCP-MCNC: 3.2 G/DL (ref 3.2–4.9)
ALBUMIN/GLOB SERPL: 1.3 G/DL
ALP SERPL-CCNC: 71 U/L (ref 30–99)
ALT SERPL-CCNC: 86 U/L (ref 2–50)
ANION GAP SERPL CALC-SCNC: 10 MMOL/L (ref 7–16)
AST SERPL-CCNC: 54 U/L (ref 12–45)
BASOPHILS # BLD AUTO: 0.2 % (ref 0–1.8)
BASOPHILS # BLD: 0.02 K/UL (ref 0–0.12)
BILIRUB SERPL-MCNC: 0.5 MG/DL (ref 0.1–1.5)
BUN SERPL-MCNC: 44 MG/DL (ref 8–22)
CALCIUM SERPL-MCNC: 8.7 MG/DL (ref 8.5–10.5)
CHLORIDE SERPL-SCNC: 93 MMOL/L (ref 96–112)
CO2 SERPL-SCNC: 25 MMOL/L (ref 20–33)
CREAT SERPL-MCNC: 2.35 MG/DL (ref 0.5–1.4)
EOSINOPHIL # BLD AUTO: 0.17 K/UL (ref 0–0.51)
EOSINOPHIL NFR BLD: 1.6 % (ref 0–6.9)
ERYTHROCYTE [DISTWIDTH] IN BLOOD BY AUTOMATED COUNT: 51.4 FL (ref 35.9–50)
GLOBULIN SER CALC-MCNC: 2.4 G/DL (ref 1.9–3.5)
GLUCOSE BLD-MCNC: 119 MG/DL (ref 65–99)
GLUCOSE BLD-MCNC: 133 MG/DL (ref 65–99)
GLUCOSE BLD-MCNC: 151 MG/DL (ref 65–99)
GLUCOSE SERPL-MCNC: 132 MG/DL (ref 65–99)
HCT VFR BLD AUTO: 26.4 % (ref 37–47)
HGB BLD-MCNC: 8.3 G/DL (ref 12–16)
IMM GRANULOCYTES # BLD AUTO: 0.07 K/UL (ref 0–0.11)
IMM GRANULOCYTES NFR BLD AUTO: 0.7 % (ref 0–0.9)
LYMPHOCYTES # BLD AUTO: 0.92 K/UL (ref 1–4.8)
LYMPHOCYTES NFR BLD: 8.8 % (ref 22–41)
MCH RBC QN AUTO: 30.9 PG (ref 27–33)
MCHC RBC AUTO-ENTMCNC: 31.4 G/DL (ref 33.6–35)
MCV RBC AUTO: 98.1 FL (ref 81.4–97.8)
MONOCYTES # BLD AUTO: 0.84 K/UL (ref 0–0.85)
MONOCYTES NFR BLD AUTO: 8.1 % (ref 0–13.4)
NEUTROPHILS # BLD AUTO: 8.41 K/UL (ref 2–7.15)
NEUTROPHILS NFR BLD: 80.6 % (ref 44–72)
NRBC # BLD AUTO: 0 K/UL
NRBC BLD-RTO: 0 /100 WBC
NT-PROBNP SERPL IA-MCNC: 2839 PG/ML (ref 0–125)
PLATELET # BLD AUTO: 182 K/UL (ref 164–446)
PMV BLD AUTO: 8.6 FL (ref 9–12.9)
POTASSIUM SERPL-SCNC: 5.2 MMOL/L (ref 3.6–5.5)
PROT SERPL-MCNC: 5.6 G/DL (ref 6–8.2)
RBC # BLD AUTO: 2.69 M/UL (ref 4.2–5.4)
SODIUM SERPL-SCNC: 128 MMOL/L (ref 135–145)
WBC # BLD AUTO: 10.4 K/UL (ref 4.8–10.8)

## 2020-07-12 PROCEDURE — 80053 COMPREHEN METABOLIC PANEL: CPT

## 2020-07-12 PROCEDURE — 94760 N-INVAS EAR/PLS OXIMETRY 1: CPT

## 2020-07-12 PROCEDURE — A9270 NON-COVERED ITEM OR SERVICE: HCPCS | Performed by: HOSPITALIST

## 2020-07-12 PROCEDURE — A9270 NON-COVERED ITEM OR SERVICE: HCPCS | Performed by: INTERNAL MEDICINE

## 2020-07-12 PROCEDURE — 85025 COMPLETE CBC W/AUTO DIFF WBC: CPT

## 2020-07-12 PROCEDURE — 700102 HCHG RX REV CODE 250 W/ 637 OVERRIDE(OP): Performed by: INTERNAL MEDICINE

## 2020-07-12 PROCEDURE — 99232 SBSQ HOSP IP/OBS MODERATE 35: CPT | Performed by: INTERNAL MEDICINE

## 2020-07-12 PROCEDURE — 770020 HCHG ROOM/CARE - TELE (206)

## 2020-07-12 PROCEDURE — 83880 ASSAY OF NATRIURETIC PEPTIDE: CPT

## 2020-07-12 PROCEDURE — 82962 GLUCOSE BLOOD TEST: CPT | Mod: 91

## 2020-07-12 PROCEDURE — 94640 AIRWAY INHALATION TREATMENT: CPT

## 2020-07-12 PROCEDURE — 700102 HCHG RX REV CODE 250 W/ 637 OVERRIDE(OP): Performed by: HOSPITALIST

## 2020-07-12 RX ADMIN — ROSUVASTATIN CALCIUM 10 MG: 20 TABLET, FILM COATED ORAL at 17:17

## 2020-07-12 RX ADMIN — FLUTICASONE PROPIONATE 88 MCG: 44 AEROSOL, METERED RESPIRATORY (INHALATION) at 06:16

## 2020-07-12 RX ADMIN — OXYCODONE HYDROCHLORIDE 10 MG: 10 TABLET ORAL at 20:32

## 2020-07-12 RX ADMIN — LINEZOLID 600 MG: 600 TABLET, FILM COATED ORAL at 04:34

## 2020-07-12 RX ADMIN — LEVOTHYROXINE SODIUM 75 MCG: 75 TABLET ORAL at 04:33

## 2020-07-12 RX ADMIN — OXYCODONE HYDROCHLORIDE 10 MG: 10 TABLET ORAL at 04:33

## 2020-07-12 RX ADMIN — DULOXETINE HYDROCHLORIDE 60 MG: 60 CAPSULE, DELAYED RELEASE ORAL at 04:33

## 2020-07-12 RX ADMIN — TRAZODONE HYDROCHLORIDE 300 MG: 150 TABLET ORAL at 04:34

## 2020-07-12 RX ADMIN — UMECLIDINIUM BROMIDE AND VILANTEROL TRIFENATATE 1 PUFF: 62.5; 25 POWDER RESPIRATORY (INHALATION) at 06:15

## 2020-07-12 RX ADMIN — OXYCODONE HYDROCHLORIDE 10 MG: 10 TABLET ORAL at 13:09

## 2020-07-12 RX ADMIN — FUROSEMIDE 20 MG: 20 TABLET ORAL at 04:33

## 2020-07-12 RX ADMIN — TRAZODONE HYDROCHLORIDE 300 MG: 150 TABLET ORAL at 20:32

## 2020-07-12 RX ADMIN — LINEZOLID 600 MG: 600 TABLET, FILM COATED ORAL at 17:17

## 2020-07-12 RX ADMIN — INSULIN HUMAN 1 UNITS: 100 INJECTION, SOLUTION PARENTERAL at 20:28

## 2020-07-12 RX ADMIN — AMLODIPINE BESYLATE 5 MG: 5 TABLET ORAL at 04:33

## 2020-07-12 ASSESSMENT — ENCOUNTER SYMPTOMS
ABDOMINAL PAIN: 0
HEADACHES: 0
COUGH: 0
SINUS PAIN: 0
NERVOUS/ANXIOUS: 0
ORTHOPNEA: 0
CHILLS: 0
LOSS OF CONSCIOUSNESS: 0
DEPRESSION: 0
WEIGHT LOSS: 0
FLANK PAIN: 0
WHEEZING: 0
WEAKNESS: 1
SORE THROAT: 0
HEMOPTYSIS: 0
FOCAL WEAKNESS: 0
DIAPHORESIS: 0
DIARRHEA: 0
VOMITING: 0
SHORTNESS OF BREATH: 0
EYES NEGATIVE: 1
CLAUDICATION: 0
NECK PAIN: 0
FEVER: 0
NAUSEA: 0
PALPITATIONS: 0
MYALGIAS: 0

## 2020-07-12 NOTE — PROGRESS NOTES
Nephrology Daily Progress Note    Date of Service  7/12/2020    Chief Complaint  69 y.o. female with a history of CKD 3, COPD who presented 6/30/2020 with shortness of breath, found to have bradycardia, hypotension, and HAN.    Interval Problem Update  7/1 -patient had dialysis yesterday with no fluid removed, and resolution of shock on completion of dialysis.  Patient had 500 mL of urine output documented last 24 hours.  Patient had dialysis again this morning with 1 L removed.  Patient says her breathing is better.  Denies chest pain, shortness of breath at this time.  7/3 -patient had third session of dialysis yesterday with 2 L removed.  Tolerated well.  Denies chest pain this morning.  Complains of continued shortness of breath, but improved from admission.  7/5 -patient had dialysis yesterday with 1.5 L removed.  Patient says she is urinating, but no urine output being recorded.  Patient says her shortness of breath is still there, but better compared to admission.  Patient denies chest pain.  7/6 -doing better, less SOB  Scheduled for HD TTS  7/9 -doing well, improved SOB  UOP 1500 cc/24 H  Holding dialysis -watching for recovery  7/10 -doing well  Edema improving  SOB better  Good UOP  Holding dialysis  7/11 -doing well, no complaints  SOB and edema improved  Good UOP  Off HD  7/12 -no acute events, no complaints  Good UOP  Creat improving  Off HD  Review of Systems  Review of Systems   Constitutional: Negative for chills, fever, malaise/fatigue and weight loss.   HENT: Negative for congestion, hearing loss and sinus pain.    Eyes: Negative.    Respiratory: Negative for cough, hemoptysis, shortness of breath and wheezing.    Cardiovascular: Negative for chest pain, palpitations, orthopnea and leg swelling.   Gastrointestinal: Negative for abdominal pain, diarrhea, nausea and vomiting.   Genitourinary: Negative for dysuria, frequency, hematuria and urgency.   Skin: Negative.    All other systems reviewed and  are negative.       Physical Exam  Temp:  [36.5 °C (97.7 °F)-37 °C (98.6 °F)] 37 °C (98.6 °F)  Pulse:  [61-77] 77  Resp:  [18-20] 18  BP: (111-154)/(50-62) 130/57  SpO2:  [90 %-98 %] 90 %    Physical Exam   Constitutional: She is oriented to person, place, and time. She appears well-developed and well-nourished. No distress.   HENT:   Head: Normocephalic and atraumatic.   Nose: Nose normal.   Mouth/Throat: Oropharynx is clear and moist.   Eyes: Pupils are equal, round, and reactive to light. Conjunctivae and EOM are normal.   Neck: Normal range of motion. Neck supple. No thyromegaly present.   Cardiovascular: Normal rate and regular rhythm. Exam reveals no gallop and no friction rub.   Pulmonary/Chest: Effort normal and breath sounds normal. No respiratory distress. She has no wheezes. She has no rales.   Abdominal: Soft. Bowel sounds are normal. She exhibits no distension and no mass. There is no abdominal tenderness. There is no guarding.   Musculoskeletal:         General: No edema.      Comments: Trace pedal edema   Neurological: She is alert and oriented to person, place, and time.   Skin: Skin is warm. No rash noted. No erythema.   Nursing note and vitals reviewed.  Access: Right IJ temporary dialysis catheter.      Fluids    Intake/Output Summary (Last 24 hours) at 7/12/2020 1046  Last data filed at 7/12/2020 1000  Gross per 24 hour   Intake 360 ml   Output 1650 ml   Net -1290 ml       Laboratory  Labs reviewed, pertinent labs below.  Recent Labs     07/10/20  0355 07/11/20  0330 07/12/20  0330   WBC 9.4 9.7 10.4   RBC 2.66* 2.57* 2.69*   HEMOGLOBIN 8.2* 7.9* 8.3*   HEMATOCRIT 25.9* 24.9* 26.4*   MCV 97.4 96.9 98.1*   MCH 30.8 30.7 30.9   MCHC 31.7* 31.7* 31.4*   RDW 50.3* 50.8* 51.4*   PLATELETCT 194 185 182   MPV 8.6* 8.7* 8.6*     Recent Labs     07/10/20  0355 07/11/20  0330 07/12/20  0330   SODIUM 131* 134* 128*   POTASSIUM 4.6 4.9 5.2   CHLORIDE 95* 98 93*   CO2 27 25 25   GLUCOSE 162* 132* 132*   BUN  40* 42* 44*   CREATININE 2.19* 2.46* 2.35*   CALCIUM 8.3* 8.4* 8.7               URINALYSIS:  Lab Results   Component Value Date/Time    COLORURINE DK Yellow 06/15/2019 0245    CLARITY Turbid (A) 06/15/2019 0245    SPECGRAVITY 1.020 06/15/2019 0245    PHURINE 5.0 06/15/2019 0245    KETONES Trace (A) 06/15/2019 0245    PROTEINURIN 300 (A) 06/15/2019 0245    BILIRUBINUR Negative 06/15/2019 0245    UROBILU 1.0 06/15/2019 0245    NITRITE Negative 06/15/2019 0245    LEUKESTERAS Negative 06/15/2019 0245    OCCULTBLOOD Negative 06/15/2019 0245     UPC  No results found for: TOTPROTUR No results found for: CREATININEU      Imaging reviewed  DX-CHEST-PORTABLE (1 VIEW)   Final Result         No significant interval change.      Unchanged diffuse interstitial prominence.      EC-ECHOCARDIOGRAM COMPLETE W/O CONT   Final Result      US-RUQ   Final Result      Cholelithiasis with mild gallbladder wall thickening but no positive sonographic Troncoso sign. This could indicate chronic cholecystitis or incomplete distention      DX-CHEST-PORTABLE (1 VIEW)   Final Result      1.  Satisfactory position of new right IJV multilumen dialysis catheter. No pneumothorax identified.      2.  Unchanged mild volume overload pattern.      DX-CHEST-PORTABLE (1 VIEW)   Final Result      No acute cardiac or pulmonary abnormality is noted. Stable cardiomegaly with increased interstitial opacity diffusely.            Current Facility-Administered Medications   Medication Dose Route Frequency Provider Last Rate Last Dose   • fluticasone (FLOVENT HFA) 44 MCG/ACT inhaler 88 mcg  2 Puff Inhalation QDAILY (RT) Roger Florence Jr., D.O.   88 mcg at 07/12/20 0616   • furosemide (LASIX) tablet 20 mg  20 mg Oral Q DAY Rosi Bansal M.D.   20 mg at 07/12/20 0433   • amLODIPine (NORVASC) tablet 5 mg  5 mg Oral Q DAY Valorie Hemphill D.O.   5 mg at 07/12/20 0433   • heparin injection 1,500 Units  1,500 Units Intravenous DIALYSIS PRN Rosi Bansal M.D.   1,500 Units at  07/08/20 1430   • ipratropium-albuterol (DUONEB) nebulizer solution  3 mL Nebulization Q2HRS PRN (RT) Christina Calderon M.D.       • hydrALAZINE (APRESOLINE) tablet 25 mg  25 mg Oral Q8HRS PRN Christina Calderon M.D.   25 mg at 07/10/20 1647   • linezolid (ZYVOX) tablet 600 mg  600 mg Oral Q12HRS Christina Calderon M.D.   600 mg at 07/12/20 0434   • heparin injection 2,000 Units  2,000 Units Intravenous DIALYSIS PRN Eric Pompa M.D.   2,000 Units at 07/07/20 1439   • oxyCODONE immediate release (ROXICODONE) tablet 10 mg  10 mg Oral TID PRN Christina Calderon M.D.   10 mg at 07/12/20 0433   • lactulose 20 GM/30ML solution 30 mL  30 mL Oral BID W/MEALS PRN Heber Armstrong M.D.       • NS (BOLUS) infusion 250 mL  250 mL Intravenous DIALYSIS PRN Eric Pompa M.D.       • albumin human 25% solution 12.5 g  12.5 g Intravenous DIALYSIS PRN Eric Pompa M.D. 150 mL/hr at 06/30/20 1513 12.5 g at 06/30/20 1513   • Respiratory Therapy Consult   Nebulization Continuous RT Roger Florence Jr., D.O.       • DULoxetine (CYMBALTA) capsule 60 mg  60 mg Oral DAILY Roger Florence Jr., D.O.   60 mg at 07/12/20 0433   • hydrOXYzine HCl (ATARAX) tablet 25 mg  25 mg Oral TID PRN Roger Florence Jr., D.O.   25 mg at 07/10/20 1642   • levothyroxine (SYNTHROID) tablet 75 mcg  75 mcg Oral AM ES Roger Florence Jr., D.O.   75 mcg at 07/12/20 0433   • lidocaine (LIDODERM) 5 % 1 Patch  1 Patch Transdermal Q24HRS PRN Roger Florence Jr., D.O.   1 Patch at 07/04/20 2211   • traZODone (DESYREL) tablet 300 mg  300 mg Oral BID Roger Florence Jr., D.O.   300 mg at 07/12/20 0434   • rosuvastatin (CRESTOR) tablet 10 mg  10 mg Oral Q EVENING Roger Florence Jr., D.O.   10 mg at 07/11/20 1735   • senna-docusate (PERICOLACE or SENOKOT S) 8.6-50 MG per tablet 2 Tab  2 Tab Oral BID ROBY Sarmiento Jr..O.   Stopped at 07/12/20 0600    And   • polyethylene glycol/lytes (MIRALAX) PACKET 1 Packet  1 Packet Oral QDAY PRN Roger Florence Jr., D.O.   1 Packet at 07/02/20 0500     And   • magnesium hydroxide (MILK OF MAGNESIA) suspension 30 mL  30 mL Oral QDAY PRN ROBY Sarmiento Jr..OMelonie        And   • bisacodyl (DULCOLAX) suppository 10 mg  10 mg Rectal QDAY PRN ROBY Sarmiento Jr..O.       • [Held by provider] heparin injection 5,000 Units  5,000 Units Subcutaneous Q8HRS ROBY Sarmiento Jr..OMelonie   Stopped at 07/10/20 2200   • ondansetron (ZOFRAN) syringe/vial injection 4 mg  4 mg Intravenous Q4HRS PRN ROBY Sarmiento Jr..O.       • ondansetron (ZOFRAN ODT) dispertab 4 mg  4 mg Oral Q4HRS PRN ROBY Sarmiento Jr..O.       • Pharmacy Consult Request ...Pain Management Review 1 Each  1 Each Other PHARMACY TO DOSE ROBY Sarmiento Jr..LANCE.       • lactated ringers infusion (BOLUS)  500 mL Intravenous Once PRN ROBY Sarmiento Jr..O.       • umeclidinium-vilanterol (ANORO ELLIPTA) inhaler 1 Puff  1 Puff Inhalation QDAILY (RT) ROBY Sarmiento Jr..OMelonie   1 Puff at 07/12/20 0615   • ipratropium-albuterol (DUONEB) nebulizer solution  3 mL Nebulization Q2HRS PRN (RT) Heber Armstrong M.D.   3 mL at 07/02/20 0317   • heparin injection 2,400 Units  2,400 Units Intracatheter ACUTE DIALYSIS PRN Eric Pompa M.D.   2,400 Units at 07/08/20 1648   • insulin regular (HUMULIN R) injection 1-6 Units  1-6 Units Subcutaneous 4X/DAY ACHS Bob Glover M.D.   Stopped at 07/12/20 0800    And   • glucose 4 g chewable tablet 16 g  16 g Oral Q15 MIN PRN Bob Glover M.D.        And   • dextrose 50% (D50W) injection 50 mL  50 mL Intravenous Q15 MIN PRN Bob Glover M.D.             Assessment/Plan  69 y.o. female with a history of CKD 3, COPD who presented 6/30/2020 with shortness of breath, found to have bradycardia, hypotension, and HAN.     1.  HAN on CKD stage III - good UOP -recovering -off dialysis    2.  HTN: BP well controlled     3.  Volume: well controlled -on lasix    4.  Hyponatremia: worse- avoid hypotonic solutions/free water     5.  Anemia: drop in Hb level - better -to  monitor     6.  Cardiogenic shock, from hemodynamically significant bradycardia -improved -clinically stable     Recs: d/c dialysis, d/c cateter             Low Na diet             Daily labs             Avoid nephrotoxins             Will follow

## 2020-07-12 NOTE — PROGRESS NOTES
Hospital Medicine Daily Progress Note    Date of Service  7/12/2020    Chief Complaint  69 y.o. female admitted 6/30/2020 with junctional bradycardia rate 37 with cardiogenic shock, SOB.    Hospital Course    7/2:  This 68 yo female admitted by critical care to ICU for HR 37 junctional rhythm, COPD on 4 LPM NC at home, DM, hypothyroidism, hepatitis C, HTN, CHF, CKD stage 4 followed by Dr. Nava presented with worsening SOB, hypoxia and cough.  She was recently admitted 6/15/20 for COPD exacerbation and pulmonary edema, given amlodopine, metoprolol and lasix.   COVID 19 negative x3.  K was elevated 6.3.  Nephrology consulted and started HD with improvement of HR, BP and SOB.  3 HD sessions performed including today with 2 L removed.  She is feeling better, but still swollen in extremities, on 4 LPM NC baseline O2, ready for dc out of ICU.  Her BP is currently controlled at 97/69, no BP meds given at this point.  K normalized and Cr imporved.  Wbc increased to 23K on IV steroids.  I have started oral taper from solumedrol 60 IV q 12 to 40mg po daily.  It is unclear why patient was started on Rocephin on admission.  I have ordered a urine culture, no UA on admission or culture.  No evidence for pneumonia.  7/3:  Patient still with orthopnea, but no wheeze, wet cough noted today.  No HD today, waiting until tomorrow.  Has temporary triple lumen RIJ.  Wbc decreasing, Cr improving 3.24 to 2.58.  EF 80% but RVP elevated 55-60 with moderate mitral regurg noted. SR 76-92.  7/4:  Remains NSR on monitor.  No SOB, had 1.5 L removed during HD today.  Cr improving, urine output improving daily.  States having productive sputum thick brown, ordered sputum culture.  Lungs CTA b/l.  7/5:  No change, continues on baseline O2 4 LPM NC, no wheeze or crackles.  7/6:  C/o productive sputum, had 5 days of Rocephin, zithromax completed.  Ordered sputum culture, sent today with thick brown.  Wbc increased last 2 days, repeat CXR no  infiltrate, improved pulmonary edema since 6/30 CXR.  + VRE on urine culture, started zyvox bid x 7d ays.*    7/7: Patient reports making 250 mL urine output  Denies any dysuria  Plan for hemodialysis today  On Zyvox  She reports generalized weakness, will likely need placement, from assisted living facility    7/8 we will monitor ins and outs closely  Discussed with nephrology, Dr. Mccabe  Denies dysuria  Generalized weakness, requiring 1 person assist  Encourage activity  Discussed with RN  Baseline oxygen needs of 4 L, will taper as tolerated  Anemia requiring transfusion, will monitor closely  Reports lethargy today    7/9 no new complaints  Patient reports urine output  Per nephrology, will start trial of Lasix  Hemodialysis per nephrology    7/10 improving shortness of breath  Improving urinary output of greater than 1200 mL's  Dialysis per nephrology  Encourage activity    7/11 SBA per staff  Poor urine output, 400 ml/24 hours    7/12  Improved urine output  1800ml/24 hours  -deconditioned, snf referral  Encourage IS use    Improved urine output, for HD catheter removal per nephro    Consultants/Specialty  Dr. Pompa  Critical care    Code Status  DNAR, I ok    Disposition   getting HD per nephrology   Hep C +, no treatment yet, patient understands the benefits of treatment and will need to pursue outpatient tx.  Resides at [facility].  OT/PT no needs.  Baseline O2 5 LPM NC.    snf referral, CM to assist  Zyvox through July 13        Review of Systems  Review of Systems   Constitutional: Negative for chills, diaphoresis, fever and malaise/fatigue.   HENT: Negative for sore throat.    Respiratory: Negative for shortness of breath.    Cardiovascular: Positive for leg swelling. Negative for chest pain and claudication.   Gastrointestinal: Negative for abdominal pain and melena.   Genitourinary: Negative for dysuria and flank pain.   Musculoskeletal: Negative for myalgias and neck pain.   Neurological: Positive for  weakness. Negative for focal weakness, loss of consciousness and headaches.   Psychiatric/Behavioral: Negative for depression. The patient is not nervous/anxious.         Physical Exam  Temp:  [36.5 °C (97.7 °F)-37 °C (98.6 °F)] 36.6 °C (97.8 °F)  Pulse:  [61-77] 62  Resp:  [16-20] 16  BP: (111-154)/(50-85) 141/85  SpO2:  [90 %-98 %] 97 %    Physical Exam  Vitals signs and nursing note reviewed.   Constitutional:       General: She is not in acute distress.     Appearance: She is well-developed. She is not ill-appearing, toxic-appearing or diaphoretic.   HENT:      Head: Normocephalic and atraumatic.      Nose: Nose normal.      Mouth/Throat:      Pharynx: No oropharyngeal exudate.   Eyes:      Extraocular Movements: Extraocular movements intact.      Pupils: Pupils are equal, round, and reactive to light.   Neck:      Musculoskeletal: Normal range of motion and neck supple.      Thyroid: No thyromegaly.      Vascular: No JVD.      Trachea: No tracheal deviation.   Cardiovascular:      Rate and Rhythm: Normal rate.      Pulses: Normal pulses.   Pulmonary:      Effort: Pulmonary effort is normal. No respiratory distress.      Breath sounds: Normal breath sounds. No wheezing.   Abdominal:      General: Bowel sounds are normal. There is no distension.      Palpations: Abdomen is soft.      Tenderness: There is no abdominal tenderness.   Musculoskeletal: Normal range of motion.         General: Swelling present. No tenderness.      Right lower leg: No edema.      Left lower leg: No edema.   Skin:     General: Skin is warm and dry.      Coloration: Skin is not pale.   Neurological:      Mental Status: She is alert and oriented to person, place, and time.      Cranial Nerves: No cranial nerve deficit.      Motor: Weakness present. No abnormal muscle tone.      Coordination: Coordination normal.      Gait: Gait normal.   Psychiatric:         Behavior: Behavior normal.         Thought Content: Thought content normal.          Fluids    Intake/Output Summary (Last 24 hours) at 7/12/2020 1303  Last data filed at 7/12/2020 1000  Gross per 24 hour   Intake 240 ml   Output 1650 ml   Net -1410 ml       Laboratory  Recent Labs     07/10/20  0355 07/11/20  0330 07/12/20  0330   WBC 9.4 9.7 10.4   RBC 2.66* 2.57* 2.69*   HEMOGLOBIN 8.2* 7.9* 8.3*   HEMATOCRIT 25.9* 24.9* 26.4*   MCV 97.4 96.9 98.1*   MCH 30.8 30.7 30.9   MCHC 31.7* 31.7* 31.4*   RDW 50.3* 50.8* 51.4*   PLATELETCT 194 185 182   MPV 8.6* 8.7* 8.6*     Recent Labs     07/10/20  0355 07/11/20  0330 07/12/20  0330   SODIUM 131* 134* 128*   POTASSIUM 4.6 4.9 5.2   CHLORIDE 95* 98 93*   CO2 27 25 25   GLUCOSE 162* 132* 132*   BUN 40* 42* 44*   CREATININE 2.19* 2.46* 2.35*   CALCIUM 8.3* 8.4* 8.7                   Imaging  DX-CHEST-PORTABLE (1 VIEW)   Final Result         No significant interval change.      Unchanged diffuse interstitial prominence.      EC-ECHOCARDIOGRAM COMPLETE W/O CONT   Final Result      US-RUQ   Final Result      Cholelithiasis with mild gallbladder wall thickening but no positive sonographic Troncoso sign. This could indicate chronic cholecystitis or incomplete distention      DX-CHEST-PORTABLE (1 VIEW)   Final Result      1.  Satisfactory position of new right IJV multilumen dialysis catheter. No pneumothorax identified.      2.  Unchanged mild volume overload pattern.      DX-CHEST-PORTABLE (1 VIEW)   Final Result      No acute cardiac or pulmonary abnormality is noted. Stable cardiomegaly with increased interstitial opacity diffusely.           Assessment/Plan  Hyperkalemia- (present on admission)  Assessment & Plan  Resolved with HD.    Acute renal failure superimposed on stage 4 chronic kidney disease (HCC)- (present on admission)  Assessment & Plan  Had recent admission, given lasix at discharge and new BP meds, metoprolol and norvasc.  HR 37 and low BP on admission.  Had HD emergently x 3 with improvement of SOB, 1 L off yesterday, 2 L off  today.  Dr. Pompa following for HD ongoing determination, for now plans on TTS schedule.  Will need temporary HD catheter placed prior to dc and HD chair set up.  Patient states she gets better, then goes back to SOB for several months off and on.       7/7 HD per nephro, monitoring for renal recovery  7/8  S/p HD, ?outpatient HD, nephro to arrange  Need to arrange ride to HD 3x/weekly from [facility] vs snf  CM to assist  - encourage activity, OOB bid  On 5L O2    7/9 s/p HD (7/8) with hypotension, now resovled  - HD per nephrology  Improving BNP    7/10 bp stable, UOP 1250 ml  nephro following, ?diuretics vs ongoing HD  Appreciate input    7/11 on lasix per neprhology  Decreased u.o 400 ml/24 hours  - monitor  HD per neprho    7/12 U.O 1800ml/24 hours  - K5.2, HD per nephrology  On lasix    Pneumonia due to infectious organism  Assessment & Plan  Had been treated with 5 days rocephin, 3 days zithromax since admission.  CXR improved edema, no definite infiltrate to suggest pneumonia  C/o productive sputum, ordered sputum culture, hold on abx for now.    Leukocytosis- (present on admission)  Assessment & Plan  2/2 steroids.    Acute exacerbation of chronic obstructive pulmonary disease (COPD) (HCC)- (present on admission)  Assessment & Plan  Patient admitted with COPD exacerbation  7/2 tapering solumedrol IV to po prednisone 40 mg daily x 4 days.  7/4:  Sputum culture ordered for productive cough.  7/10: 99 on 5L, taper to baseline needs as tolerated    DM type 2, uncontrolled, with renal complications (HCC)- (present on admission)  Assessment & Plan  SSI  Diabetic, renal diet.    UTI (urinary tract infection) due to Enterococcus  Assessment & Plan  VRE on urine culture ss zyvox  Increasing wbc daily.  7/6:  Treat zyvox x 7 days.    7/7 ongoing wbc  F/u am labs    7/8 improving wbc  Cont zyvox 7/13    Renovascular hypertension  Assessment & Plan  Ordered PRN BP meds for sbp >160 or DBP >90 hydralazine.  Monitor  Avoid BB  since she went into cardiogenic shock 2/2 bradycardia.    Chronic hepatitis C without hepatic coma (HCC)- (present on admission)  Assessment & Plan  Will need outpatient treatment.  Elevated LFTs.    Junctional bradycardia- (present on admission)  Assessment & Plan  Combination of hyperkalemia, acute on CKD4 and metoprolol, norvasc  Now resolved s/p HD.    Acute on chronic respiratory failure with hypoxia (HCC)- (present on admission)  Assessment & Plan  Initially admitted to ICU for respiratory support.  No intubation is noted in the record.    Anemia- (present on admission)  Assessment & Plan  2/2 CKD stage 4.    7/8 hgb 6.6, plan for transfusion today  - f/u am cbc, monitor    7/9 s/p 2 u prbc, hgb 9  F/u am labs    7/10 monitor cbc  Significant hypotension noted with anemia and HD, s/p transfusion  hgb 9 to 8, monitor    7/11 hgb 8 to 7.9, monitor  F/u am labs    7/12 h/h stable, monitor  Transfuse prn    Pulmonary hypertension (HCC)- (present on admission)  Assessment & Plan  EF 80%  RVP elevated 55-60%.  Likely will need some diuretic for pulmonary edema.  Currently on HD with 2 L removed on 7/3.      Hyponatremia  Assessment & Plan  7/12 - ARF, on HD per nephrology  - monitor    Tobacco dependence- (present on admission)  Assessment & Plan  Recommend cessation    Chronic use of opiate drug for therapeutic purpose- (present on admission)  Assessment & Plan  On oxycodone 10 mg tid at home, continued in hospital    EDITH (obstructive sleep apnea)- (present on admission)  Assessment & Plan  ongoing    Hypothyroidism- (present on admission)  Assessment & Plan  Continue home meds.    Essential hypertension- (present on admission)  Assessment & Plan  7/9 uncontrolled, with intermittent hypotension with HD  Monitor closely, add norvasc with holding parameters       VTE prophylaxis: heparin

## 2020-07-12 NOTE — CARE PLAN
Problem: Safety  Goal: Will remain free from injury  Outcome: PROGRESSING AS EXPECTED     Problem: Venous Thromboembolism (VTW)/Deep Vein Thrombosis (DVT) Prevention:  Goal: Patient will participate in Venous Thrombosis (VTE)/Deep Vein Thrombosis (DVT)Prevention Measures  Outcome: PROGRESSING AS EXPECTED     Problem: Knowledge Deficit  Goal: Knowledge of disease process/condition, treatment plan, diagnostic tests, and medications will improve  Outcome: PROGRESSING AS EXPECTED     Problem: Discharge Barriers/Planning  Goal: Patient's continuum of care needs will be met  Outcome: PROGRESSING AS EXPECTED     Problem: Fluid Volume:  Goal: Will maintain balanced intake and output  Outcome: PROGRESSING AS EXPECTED

## 2020-07-12 NOTE — PROGRESS NOTES
Received bedside report from RN, pt care assumed, VSS, pt assessment complete. Pt AAOx4, 7/10 pain at this time. Pt received medication for pain. No signs of acute distress noted at this time. POC discussed with pt and verbalizes no questions. Pt denies any additional needs at this time. Bed in lowest position, pt educated on fall risk and verbalized understanding, call light within reach, hourly rounding initiated.

## 2020-07-12 NOTE — CARE PLAN
Problem: Safety  Goal: Will remain free from injury  Outcome: PROGRESSING AS EXPECTED  Goal: Will remain free from falls  Outcome: PROGRESSING AS EXPECTED     Problem: Bowel/Gastric:  Goal: Normal bowel function is maintained or improved  Outcome: PROGRESSING AS EXPECTED  Goal: Will not experience complications related to bowel motility  Outcome: PROGRESSING AS EXPECTED     Problem: Pain Management  Goal: Pain level will decrease to patient's comfort goal  Outcome: PROGRESSING AS EXPECTED

## 2020-07-13 LAB
ALBUMIN SERPL BCP-MCNC: 3.3 G/DL (ref 3.2–4.9)
ALBUMIN/GLOB SERPL: 1.4 G/DL
ALP SERPL-CCNC: 68 U/L (ref 30–99)
ALT SERPL-CCNC: 97 U/L (ref 2–50)
ANION GAP SERPL CALC-SCNC: 13 MMOL/L (ref 7–16)
AST SERPL-CCNC: 61 U/L (ref 12–45)
BILIRUB SERPL-MCNC: 0.5 MG/DL (ref 0.1–1.5)
BUN SERPL-MCNC: 48 MG/DL (ref 8–22)
CALCIUM SERPL-MCNC: 8.9 MG/DL (ref 8.5–10.5)
CHLORIDE SERPL-SCNC: 95 MMOL/L (ref 96–112)
CO2 SERPL-SCNC: 25 MMOL/L (ref 20–33)
CREAT SERPL-MCNC: 2.85 MG/DL (ref 0.5–1.4)
GLOBULIN SER CALC-MCNC: 2.4 G/DL (ref 1.9–3.5)
GLUCOSE BLD-MCNC: 124 MG/DL (ref 65–99)
GLUCOSE BLD-MCNC: 128 MG/DL (ref 65–99)
GLUCOSE BLD-MCNC: 150 MG/DL (ref 65–99)
GLUCOSE SERPL-MCNC: 115 MG/DL (ref 65–99)
POTASSIUM SERPL-SCNC: 5.4 MMOL/L (ref 3.6–5.5)
PROT SERPL-MCNC: 5.7 G/DL (ref 6–8.2)
SODIUM SERPL-SCNC: 133 MMOL/L (ref 135–145)

## 2020-07-13 PROCEDURE — A9270 NON-COVERED ITEM OR SERVICE: HCPCS | Performed by: INTERNAL MEDICINE

## 2020-07-13 PROCEDURE — 700102 HCHG RX REV CODE 250 W/ 637 OVERRIDE(OP): Performed by: INTERNAL MEDICINE

## 2020-07-13 PROCEDURE — 80053 COMPREHEN METABOLIC PANEL: CPT

## 2020-07-13 PROCEDURE — 99233 SBSQ HOSP IP/OBS HIGH 50: CPT | Performed by: INTERNAL MEDICINE

## 2020-07-13 PROCEDURE — 94760 N-INVAS EAR/PLS OXIMETRY 1: CPT

## 2020-07-13 PROCEDURE — 700102 HCHG RX REV CODE 250 W/ 637 OVERRIDE(OP): Performed by: HOSPITALIST

## 2020-07-13 PROCEDURE — 82962 GLUCOSE BLOOD TEST: CPT

## 2020-07-13 PROCEDURE — 94640 AIRWAY INHALATION TREATMENT: CPT

## 2020-07-13 PROCEDURE — A9270 NON-COVERED ITEM OR SERVICE: HCPCS | Performed by: HOSPITALIST

## 2020-07-13 PROCEDURE — 99232 SBSQ HOSP IP/OBS MODERATE 35: CPT | Performed by: INTERNAL MEDICINE

## 2020-07-13 PROCEDURE — 770020 HCHG ROOM/CARE - TELE (206)

## 2020-07-13 RX ORDER — FUROSEMIDE 40 MG/1
40 TABLET ORAL
Status: DISCONTINUED | OUTPATIENT
Start: 2020-07-13 | End: 2020-07-14

## 2020-07-13 RX ADMIN — OXYCODONE HYDROCHLORIDE 10 MG: 10 TABLET ORAL at 05:49

## 2020-07-13 RX ADMIN — AMLODIPINE BESYLATE 5 MG: 5 TABLET ORAL at 05:47

## 2020-07-13 RX ADMIN — TRAZODONE HYDROCHLORIDE 300 MG: 150 TABLET ORAL at 05:49

## 2020-07-13 RX ADMIN — OXYCODONE HYDROCHLORIDE 10 MG: 10 TABLET ORAL at 13:01

## 2020-07-13 RX ADMIN — FUROSEMIDE 40 MG: 40 TABLET ORAL at 13:01

## 2020-07-13 RX ADMIN — LINEZOLID 600 MG: 600 TABLET, FILM COATED ORAL at 05:49

## 2020-07-13 RX ADMIN — FUROSEMIDE 20 MG: 20 TABLET ORAL at 08:32

## 2020-07-13 RX ADMIN — UMECLIDINIUM BROMIDE AND VILANTEROL TRIFENATATE 1 PUFF: 62.5; 25 POWDER RESPIRATORY (INHALATION) at 06:31

## 2020-07-13 RX ADMIN — LEVOTHYROXINE SODIUM 75 MCG: 75 TABLET ORAL at 05:49

## 2020-07-13 RX ADMIN — INSULIN HUMAN 1 UNITS: 100 INJECTION, SOLUTION PARENTERAL at 13:02

## 2020-07-13 RX ADMIN — TRAZODONE HYDROCHLORIDE 300 MG: 150 TABLET ORAL at 21:37

## 2020-07-13 RX ADMIN — DULOXETINE HYDROCHLORIDE 60 MG: 60 CAPSULE, DELAYED RELEASE ORAL at 05:47

## 2020-07-13 RX ADMIN — ROSUVASTATIN CALCIUM 10 MG: 20 TABLET, FILM COATED ORAL at 17:18

## 2020-07-13 RX ADMIN — FLUTICASONE PROPIONATE 88 MCG: 44 AEROSOL, METERED RESPIRATORY (INHALATION) at 06:31

## 2020-07-13 RX ADMIN — OXYCODONE HYDROCHLORIDE 10 MG: 10 TABLET ORAL at 19:36

## 2020-07-13 ASSESSMENT — ENCOUNTER SYMPTOMS
DIZZINESS: 0
FOCAL WEAKNESS: 0
HEADACHES: 0
SORE THROAT: 0
FEVER: 0
COUGH: 0
CLAUDICATION: 0
VOMITING: 0
NERVOUS/ANXIOUS: 1
LOSS OF CONSCIOUSNESS: 0
MYALGIAS: 0
WEAKNESS: 1
NECK PAIN: 0
SHORTNESS OF BREATH: 0
CHILLS: 0
ABDOMINAL PAIN: 0
NAUSEA: 0
NERVOUS/ANXIOUS: 0

## 2020-07-13 NOTE — CARE PLAN
Problem: Safety  Goal: Will remain free from injury  Outcome: PROGRESSING AS EXPECTED  Goal: Will remain free from falls  Outcome: PROGRESSING AS EXPECTED     Problem: Bowel/Gastric:  Goal: Normal bowel function is maintained or improved  Outcome: PROGRESSING AS EXPECTED  Goal: Will not experience complications related to bowel motility  Outcome: PROGRESSING AS EXPECTED     Problem: Pain Management  Goal: Pain level will decrease to patient's comfort goal  Outcome: PROGRESSING AS EXPECTED     Problem: Respiratory:  Goal: Respiratory status will improve  Outcome: PROGRESSING AS EXPECTED     Problem: Fluid Volume:  Goal: Will maintain balanced intake and output  Outcome: PROGRESSING AS EXPECTED

## 2020-07-13 NOTE — DISCHARGE PLANNING
Received a phone call from Siri Chappell, .  Updated her on patient's progress.  She provided list of SNF facilities that are in network with patient's insurance.      Spoke with BS RN, Jennifer, who stated that patient is able to ambulate on her own, but cannot always clean themselves up appropriately after using the restroom.  Noted that PT/OT have not seen patient since 7/4.  Jennifer is going to put in an order to have PT/OT re-eval patient.    Will follow up in the morning w/ PT/OT evals and w/ BS RN for dc needs.

## 2020-07-13 NOTE — PROGRESS NOTES
Received bedside report from RN, pt care assumed, VSS, pt assessment complete. Pt AAOx4, 6/10 pain at this time. Pt will receive medication for pain. No signs of acute distress noted at this time. POC discussed with pt and verbalizes no questions. Pt denies any additional needs at this time. Bed in lowest position, pt educated on fall risk and verbalized understanding, call light within reach, hourly rounding initiated.

## 2020-07-13 NOTE — CARE PLAN
Problem: Safety  Goal: Will remain free from injury  Outcome: PROGRESSING AS EXPECTED     Problem: Venous Thromboembolism (VTW)/Deep Vein Thrombosis (DVT) Prevention:  Goal: Patient will participate in Venous Thrombosis (VTE)/Deep Vein Thrombosis (DVT)Prevention Measures  Outcome: PROGRESSING AS EXPECTED     Problem: Knowledge Deficit  Goal: Knowledge of disease process/condition, treatment plan, diagnostic tests, and medications will improve  Outcome: PROGRESSING AS EXPECTED     Problem: Pain Management  Goal: Pain level will decrease to patient's comfort goal  Outcome: PROGRESSING AS EXPECTED     Problem: Respiratory:  Goal: Respiratory status will improve  Outcome: PROGRESSING AS EXPECTED

## 2020-07-13 NOTE — PROGRESS NOTES
Hospital Medicine Daily Progress Note    Date of Service  7/13/2020    Chief Complaint  69 y.o. female admitted 6/30/2020 with junctional bradycardia rate 37 with cardiogenic shock, SOB.    Hospital Course    7/2:  This 70 yo female admitted by critical care to ICU for HR 37 junctional rhythm, COPD on 4 LPM NC at home, DM, hypothyroidism, hepatitis C, HTN, CHF, CKD stage 4 followed by Dr. Nava presented with worsening SOB, hypoxia and cough.  She was recently admitted 6/15/20 for COPD exacerbation and pulmonary edema, given amlodopine, metoprolol and lasix.   COVID 19 negative x3.  K was elevated 6.3.  Nephrology consulted and started HD with improvement of HR, BP and SOB.  3 HD sessions performed including today with 2 L removed.  She is feeling better, but still swollen in extremities, on 4 LPM NC baseline O2, ready for dc out of ICU.  Her BP is currently controlled at 97/69, no BP meds given at this point.  K normalized and Cr imporved.  Wbc increased to 23K on IV steroids.  I have started oral taper from solumedrol 60 IV q 12 to 40mg po daily.  It is unclear why patient was started on Rocephin on admission.  I have ordered a urine culture, no UA on admission or culture.  No evidence for pneumonia.  7/3:  Patient still with orthopnea, but no wheeze, wet cough noted today.  No HD today, waiting until tomorrow.  Has temporary triple lumen RIJ.  Wbc decreasing, Cr improving 3.24 to 2.58.  EF 80% but RVP elevated 55-60 with moderate mitral regurg noted. SR 76-92.  7/4:  Remains NSR on monitor.  No SOB, had 1.5 L removed during HD today.  Cr improving, urine output improving daily.  States having productive sputum thick brown, ordered sputum culture.  Lungs CTA b/l.  7/5:  No change, continues on baseline O2 4 LPM NC, no wheeze or crackles.  7/6:  C/o productive sputum, had 5 days of Rocephin, zithromax completed.  Ordered sputum culture, sent today with thick brown.  Wbc increased last 2 days, repeat CXR no  infiltrate, improved pulmonary edema since 6/30 CXR.  + VRE on urine culture, started zyvox bid x 7d ays.*    7/7: Patient reports making 250 mL urine output  Denies any dysuria  Plan for hemodialysis today  On Zyvox  She reports generalized weakness, will likely need placement, from assisted living facility    7/8 we will monitor ins and outs closely  Discussed with nephrology, Dr. Mccabe  Denies dysuria  Generalized weakness, requiring 1 person assist  Encourage activity  Discussed with RN  Baseline oxygen needs of 4 L, will taper as tolerated  Anemia requiring transfusion, will monitor closely  Reports lethargy today    7/9 no new complaints  Patient reports urine output  Per nephrology, will start trial of Lasix  Hemodialysis per nephrology    7/10 improving shortness of breath  Improving urinary output of greater than 1200 mL's  Dialysis per nephrology  Encourage activity    7/11 SBA per staff  Poor urine output, 400 ml/24 hours    7/12  Improved urine output  1800ml/24 hours  -deconditioned, snf referral  Encourage IS use    Improved urine output, for HD catheter removal per nephro    7/13 poor urine output overnight, 585/20 4 hours  On Lasix  Potassium of 5.4    Per nephrology, no plans for outpatient dialysis  We will monitor    Consultants/Specialty  Dr. Pompa  Critical care    Code Status  DNAR, I ok    Disposition   getting HD per nephrology   Hep C +, no treatment yet, patient understands the benefits of treatment and will need to pursue outpatient tx.  Resides at [facility].  OT/PT no needs.  Baseline O2 5 LPM NC.    snf referral, CM to assist  Zyvox through July 13        Review of Systems  Review of Systems   Constitutional: Negative for chills and fever.   HENT: Negative for sore throat.    Respiratory: Negative for cough and shortness of breath.    Cardiovascular: Positive for leg swelling. Negative for chest pain and claudication.   Gastrointestinal: Negative for abdominal pain.   Genitourinary: Negative  for dysuria.   Musculoskeletal: Negative for myalgias and neck pain.   Neurological: Positive for weakness. Negative for dizziness, focal weakness, loss of consciousness and headaches.   Psychiatric/Behavioral: The patient is not nervous/anxious.         Physical Exam  Temp:  [36.2 °C (97.1 °F)-36.6 °C (97.8 °F)] 36.4 °C (97.5 °F)  Pulse:  [60-78] 71  Resp:  [18-19] 18  BP: (101-145)/(59-70) 101/61  SpO2:  [92 %-98 %] 92 %    Physical Exam  Vitals signs and nursing note reviewed.   Constitutional:       General: She is not in acute distress.     Appearance: She is well-developed. She is not ill-appearing or toxic-appearing.   HENT:      Head: Normocephalic and atraumatic.      Nose: Nose normal.      Mouth/Throat:      Pharynx: No oropharyngeal exudate.   Eyes:      Extraocular Movements: Extraocular movements intact.      Pupils: Pupils are equal, round, and reactive to light.   Neck:      Musculoskeletal: Normal range of motion and neck supple.      Thyroid: No thyromegaly.      Vascular: No JVD.      Trachea: No tracheal deviation.   Cardiovascular:      Rate and Rhythm: Normal rate.      Pulses: Normal pulses.   Pulmonary:      Effort: Pulmonary effort is normal. No respiratory distress.      Breath sounds: Normal breath sounds. No wheezing.   Abdominal:      General: Bowel sounds are normal. There is no distension.      Palpations: Abdomen is soft.   Musculoskeletal: Normal range of motion.         General: Swelling present. No tenderness.   Skin:     General: Skin is warm and dry.      Coloration: Skin is not pale.   Neurological:      Mental Status: She is alert and oriented to person, place, and time.      Cranial Nerves: No cranial nerve deficit.      Motor: Weakness present. No abnormal muscle tone.   Psychiatric:         Thought Content: Thought content normal.         Fluids    Intake/Output Summary (Last 24 hours) at 7/13/2020 4692  Last data filed at 7/13/2020 0445  Gross per 24 hour   Intake 240 ml    Output 285 ml   Net -45 ml       Laboratory  Recent Labs     07/11/20  0330 07/12/20  0330   WBC 9.7 10.4   RBC 2.57* 2.69*   HEMOGLOBIN 7.9* 8.3*   HEMATOCRIT 24.9* 26.4*   MCV 96.9 98.1*   MCH 30.7 30.9   MCHC 31.7* 31.4*   RDW 50.8* 51.4*   PLATELETCT 185 182   MPV 8.7* 8.6*     Recent Labs     07/11/20  0330 07/12/20  0330 07/13/20  0155   SODIUM 134* 128* 133*   POTASSIUM 4.9 5.2 5.4   CHLORIDE 98 93* 95*   CO2 25 25 25   GLUCOSE 132* 132* 115*   BUN 42* 44* 48*   CREATININE 2.46* 2.35* 2.85*   CALCIUM 8.4* 8.7 8.9                   Imaging  DX-CHEST-PORTABLE (1 VIEW)   Final Result         No significant interval change.      Unchanged diffuse interstitial prominence.      EC-ECHOCARDIOGRAM COMPLETE W/O CONT   Final Result      US-RUQ   Final Result      Cholelithiasis with mild gallbladder wall thickening but no positive sonographic T[place]o sign. This could indicate chronic cholecystitis or incomplete distention      DX-CHEST-PORTABLE (1 VIEW)   Final Result      1.  Satisfactory position of new right IJV multilumen dialysis catheter. No pneumothorax identified.      2.  Unchanged mild volume overload pattern.      DX-CHEST-PORTABLE (1 VIEW)   Final Result      No acute cardiac or pulmonary abnormality is noted. Stable cardiomegaly with increased interstitial opacity diffusely.           Assessment/Plan  Hyperkalemia- (present on admission)  Assessment & Plan  Resolved with HD.    Acute renal failure superimposed on stage 4 chronic kidney disease (HCC)- (present on admission)  Assessment & Plan  Had recent admission, given lasix at discharge and new BP meds, metoprolol and norvasc.  HR 37 and low BP on admission.  Had HD emergently x 3 with improvement of SOB, 1 L off yesterday, 2 L off today.  Dr. Pompa following for HD ongoing determination, for now plans on TTS schedule.  Will need temporary HD catheter placed prior to dc and HD chair set up.  Patient states she gets better, then goes back to SOB for  several months off and on.       7/7 HD per nephro, monitoring for renal recovery  7/8  S/p HD, ?outpatient HD, nephro to arrange  Need to arrange ride to HD 3x/weekly from [facility] vs snf  CM to assist  - encourage activity, OOB bid  On 5L O2    7/9 s/p HD (7/8) with hypotension, now resovled  - HD per nephrology  Improving BNP    7/10 bp stable, UOP 1250 ml  nephro following, ?diuretics vs ongoing HD  Appreciate input    7/11 on lasix per neprhology  Decreased u.o 400 ml/24 hours  - monitor  HD per neprho    7/12 U.O 1800ml/24 hours  - K5.2, HD per nephrology  On lasix    7/13 u.o. 585/24 hours  - K 5.4, increasing CR 2.85  Nephrology following  On lasix  - now off HD    - snf referral, CM assisting    Pneumonia due to infectious organism  Assessment & Plan  Had been treated with 5 days rocephin, 3 days zithromax since admission.  CXR improved edema, no definite infiltrate to suggest pneumonia  C/o productive sputum, ordered sputum culture, hold on abx for now.    Leukocytosis- (present on admission)  Assessment & Plan  2/2 steroids.    Acute exacerbation of chronic obstructive pulmonary disease (COPD) (HCC)- (present on admission)  Assessment & Plan  Patient admitted with COPD exacerbation  7/2 tapering solumedrol IV to po prednisone 40 mg daily x 4 days.  7/4:  Sputum culture ordered for productive cough.  7/10: 99 on 5L, taper to baseline needs as tolerated    DM type 2, uncontrolled, with renal complications (HCC)- (present on admission)  Assessment & Plan  SSI  Diabetic, renal diet.    UTI (urinary tract infection) due to Enterococcus  Assessment & Plan  VRE on urine culture ss zyvox  Increasing wbc daily.  7/6:  Treat zyvox x 7 days.    7/7 ongoing wbc  F/u am labs    7/8 improving wbc  Cont zyvox 7/13    Renovascular hypertension  Assessment & Plan  Ordered PRN BP meds for sbp >160 or DBP >90 hydralazine.  Monitor  Avoid BB since she went into cardiogenic shock 2/2 bradycardia.    Chronic hepatitis C without  hepatic coma (HCC)- (present on admission)  Assessment & Plan  Will need outpatient treatment.  Elevated LFTs.    Junctional bradycardia- (present on admission)  Assessment & Plan  Combination of hyperkalemia, acute on CKD4 and metoprolol, norvasc  Now resolved s/p HD.    Acute on chronic respiratory failure with hypoxia (HCC)- (present on admission)  Assessment & Plan  Initially admitted to ICU for respiratory support.  No intubation is noted in the record.    Anemia- (present on admission)  Assessment & Plan  2/2 CKD stage 4.    7/8 hgb 6.6, plan for transfusion today  - f/u am cbc, monitor    7/9 s/p 2 u prbc, hgb 9  F/u am labs    7/10 monitor cbc  Significant hypotension noted with anemia and HD, s/p transfusion  hgb 9 to 8, monitor    7/11 hgb 8 to 7.9, monitor  F/u am labs    7/12 h/h stable, monitor  Transfuse prn    Pulmonary hypertension (HCC)- (present on admission)  Assessment & Plan  EF 80%  RVP elevated 55-60%.  Likely will need some diuretic for pulmonary edema.  Currently on HD with 2 L removed on 7/3.      Hyponatremia  Assessment & Plan  7/12 - ARF, on HD per nephrology  - monitor    Tobacco dependence- (present on admission)  Assessment & Plan  Recommend cessation    Chronic use of opiate drug for therapeutic purpose- (present on admission)  Assessment & Plan  On oxycodone 10 mg tid at home, continued in hospital    EDITH (obstructive sleep apnea)- (present on admission)  Assessment & Plan  ongoing    Hypothyroidism- (present on admission)  Assessment & Plan  Continue home meds.    Essential hypertension- (present on admission)  Assessment & Plan  7/9 uncontrolled, with intermittent hypotension with HD  Monitor closely, add norvasc with holding parameters       VTE prophylaxis: heparin

## 2020-07-13 NOTE — PROGRESS NOTES
Nephrology Daily Progress Note    Date of Service  7/13/2020    Chief Complaint  69 y.o. female with a history of CKD 3, COPD who presented 6/30/2020 with shortness of breath, found to have bradycardia, hypotension, and HAN.    Interval Problem Update  7/1 -patient had dialysis yesterday with no fluid removed, and resolution of shock on completion of dialysis.  Patient had 500 mL of urine output documented last 24 hours.  Patient had dialysis again this morning with 1 L removed.  Patient says her breathing is better.  Denies chest pain, shortness of breath at this time.  7/3 -patient had third session of dialysis yesterday with 2 L removed.  Tolerated well.  Denies chest pain this morning.  Complains of continued shortness of breath, but improved from admission.  7/5 -patient had dialysis yesterday with 1.5 L removed.  Patient says she is urinating, but no urine output being recorded.  Patient says her shortness of breath is still there, but better compared to admission.  Patient denies chest pain.  7/6 -doing better, less SOB  Scheduled for HD TTS  7/9 -doing well, improved SOB  UOP 1500 cc/24 H  Holding dialysis -watching for recovery  7/10 -doing well  Edema improving  SOB better  Good UOP  Holding dialysis  7/11 -doing well, no complaints  SOB and edema improved  Good UOP  Off HD  7/12 -no acute events, no complaints  Good UOP  Creat improving  Off HD  7/13 patient is doing better , still dyspnea on exertion , no chest pain, creatinine is worse.    review of Systems  Review of Systems   Constitutional: Negative for chills, fever and malaise/fatigue.   Respiratory: Negative for cough and shortness of breath.    Cardiovascular: Negative for chest pain and leg swelling.        Dyspnea on exertion   Gastrointestinal: Negative for nausea and vomiting.   Genitourinary: Negative for dysuria, frequency and urgency.   Psychiatric/Behavioral: The patient is nervous/anxious.    All other systems reviewed and are  negative.       Physical Exam  Temp:  [36.2 °C (97.1 °F)-36.6 °C (97.8 °F)] 36.4 °C (97.5 °F)  Pulse:  [60-78] 71  Resp:  [18-19] 18  BP: (101-145)/(59-70) 101/61  SpO2:  [92 %-98 %] 92 %    Physical Exam  Vitals signs and nursing note reviewed.   Constitutional:       General: She is not in acute distress.     Appearance: She is well-developed. She is ill-appearing. She is not diaphoretic.      Interventions: She is sedated and intubated.   HENT:      Head: Normocephalic and atraumatic.      Right Ear: External ear normal.      Left Ear: External ear normal.      Nose: Nose normal. No rhinorrhea.      Mouth/Throat:      Pharynx: No oropharyngeal exudate or posterior oropharyngeal erythema.   Eyes:      General: No scleral icterus.        Right eye: No discharge.         Left eye: No discharge.      Conjunctiva/sclera: Conjunctivae normal.   Neck:      Musculoskeletal: No neck rigidity or muscular tenderness.      Vascular: No carotid bruit.   Cardiovascular:      Rate and Rhythm: Normal rate and regular rhythm.      Heart sounds: Murmur present.   Pulmonary:      Effort: Pulmonary effort is normal. No respiratory distress. She is intubated.      Breath sounds: Normal breath sounds.   Abdominal:      General: Abdomen is flat. There is no distension.      Palpations: Abdomen is soft. There is no mass.   Musculoskeletal:         General: No tenderness.      Right lower leg: No edema.      Left lower leg: No edema.   Skin:     General: Skin is warm and dry.      Coloration: Skin is not jaundiced.   Neurological:      General: No focal deficit present.      Mental Status: She is oriented to person, place, and time. Mental status is at baseline.   Psychiatric:         Mood and Affect: Mood normal.         Behavior: Behavior normal.         Thought Content: Thought content normal.     Access: Right IJ temporary dialysis catheter.      Fluids    Intake/Output Summary (Last 24 hours) at 7/13/2020 6205  Last data filed at  7/13/2020 0445  Gross per 24 hour   Intake 240 ml   Output 285 ml   Net -45 ml       Laboratory  Labs reviewed, pertinent labs below.  Recent Labs     07/11/20  0330 07/12/20  0330   WBC 9.7 10.4   RBC 2.57* 2.69*   HEMOGLOBIN 7.9* 8.3*   HEMATOCRIT 24.9* 26.4*   MCV 96.9 98.1*   MCH 30.7 30.9   MCHC 31.7* 31.4*   RDW 50.8* 51.4*   PLATELETCT 185 182   MPV 8.7* 8.6*     Recent Labs     07/11/20  0330 07/12/20  0330 07/13/20  0155   SODIUM 134* 128* 133*   POTASSIUM 4.9 5.2 5.4   CHLORIDE 98 93* 95*   CO2 25 25 25   GLUCOSE 132* 132* 115*   BUN 42* 44* 48*   CREATININE 2.46* 2.35* 2.85*   CALCIUM 8.4* 8.7 8.9               URINALYSIS:  Lab Results   Component Value Date/Time    COLORURINE DK Yellow 06/15/2019 0245    CLARITY Turbid (A) 06/15/2019 0245    SPECGRAVITY 1.020 06/15/2019 0245    PHURINE 5.0 06/15/2019 0245    KETONES Trace (A) 06/15/2019 0245    PROTEINURIN 300 (A) 06/15/2019 0245    BILIRUBINUR Negative 06/15/2019 0245    UROBILU 1.0 06/15/2019 0245    NITRITE Negative 06/15/2019 0245    LEUKESTERAS Negative 06/15/2019 0245    OCCULTBLOOD Negative 06/15/2019 0245     UPC  No results found for: TOTPROTUR No results found for: CREATININEU      Imaging reviewed  DX-CHEST-PORTABLE (1 VIEW)   Final Result         No significant interval change.      Unchanged diffuse interstitial prominence.      EC-ECHOCARDIOGRAM COMPLETE W/O CONT   Final Result      US-RUQ   Final Result      Cholelithiasis with mild gallbladder wall thickening but no positive sonographic Troncoso sign. This could indicate chronic cholecystitis or incomplete distention      DX-CHEST-PORTABLE (1 VIEW)   Final Result      1.  Satisfactory position of new right IJV multilumen dialysis catheter. No pneumothorax identified.      2.  Unchanged mild volume overload pattern.      DX-CHEST-PORTABLE (1 VIEW)   Final Result      No acute cardiac or pulmonary abnormality is noted. Stable cardiomegaly with increased interstitial opacity diffusely.             Current Facility-Administered Medications   Medication Dose Route Frequency Provider Last Rate Last Dose   • fluticasone (FLOVENT HFA) 44 MCG/ACT inhaler 88 mcg  2 Puff Inhalation QDAILY (RT) Roger Florence Jr., D.O.   88 mcg at 07/13/20 0631   • amLODIPine (NORVASC) tablet 5 mg  5 mg Oral Q DAY ROBY Dimas.OMelonie   5 mg at 07/13/20 0547   • heparin injection 1,500 Units  1,500 Units Intravenous DIALYSIS PRN Rosi Bansal M.D.   1,500 Units at 07/08/20 1430   • ipratropium-albuterol (DUONEB) nebulizer solution  3 mL Nebulization Q2HRS PRN (RT) Christina Calderon M.D.       • hydrALAZINE (APRESOLINE) tablet 25 mg  25 mg Oral Q8HRS PRN Christina Calderon M.D.   25 mg at 07/10/20 1647   • heparin injection 2,000 Units  2,000 Units Intravenous DIALYSIS PRN Eric Pompa M.D.   2,000 Units at 07/07/20 1439   • oxyCODONE immediate release (ROXICODONE) tablet 10 mg  10 mg Oral TID PRN Christina Calderon M.D.   10 mg at 07/13/20 0549   • lactulose 20 GM/30ML solution 30 mL  30 mL Oral BID W/MEALS PRN Heber Armstrong M.D.       • NS (BOLUS) infusion 250 mL  250 mL Intravenous DIALYSIS PRN Eric Pompa M.D.       • albumin human 25% solution 12.5 g  12.5 g Intravenous DIALYSIS PRN Eric Pompa M.D. 150 mL/hr at 06/30/20 1513 12.5 g at 06/30/20 1513   • Respiratory Therapy Consult   Nebulization Continuous RT Roger Florence Jr., D.O.       • DULoxetine (CYMBALTA) capsule 60 mg  60 mg Oral DAILY Roger Florence Jr., D.O.   60 mg at 07/13/20 0547   • hydrOXYzine HCl (ATARAX) tablet 25 mg  25 mg Oral TID PRN Roger Florence Jr., D.O.   25 mg at 07/10/20 1642   • levothyroxine (SYNTHROID) tablet 75 mcg  75 mcg Oral AM ES Roger Florence Jr., D.O.   75 mcg at 07/13/20 0549   • lidocaine (LIDODERM) 5 % 1 Patch  1 Patch Transdermal Q24HRS PRN Roger Florence Jr., D.O.   1 Patch at 07/04/20 2211   • traZODone (DESYREL) tablet 300 mg  300 mg Oral BID Roger Florence Jr., D.O.   300 mg at 07/13/20 0549   • rosuvastatin (CRESTOR) tablet 10 mg   10 mg Oral Q EVENING ROBY Sarmiento Jr..O.   10 mg at 07/12/20 1717   • senna-docusate (PERICOLACE or SENOKOT S) 8.6-50 MG per tablet 2 Tab  2 Tab Oral BID ROBY Sarmiento Jr..O.   Stopped at 07/12/20 0600    And   • polyethylene glycol/lytes (MIRALAX) PACKET 1 Packet  1 Packet Oral QDAY PRN ROBY Sarmiento Jr..O.   1 Packet at 07/02/20 0500    And   • magnesium hydroxide (MILK OF MAGNESIA) suspension 30 mL  30 mL Oral QDAY PRN ROBY Sarmiento Jr..OMelonie        And   • bisacodyl (DULCOLAX) suppository 10 mg  10 mg Rectal QDAY PRN ROBY Sarmiento Jr..O.       • [Held by provider] heparin injection 5,000 Units  5,000 Units Subcutaneous Q8HRS ROBY Sarmiento Jr..O.   Stopped at 07/10/20 2200   • ondansetron (ZOFRAN) syringe/vial injection 4 mg  4 mg Intravenous Q4HRS PRN ROBY Sarmiento Jr..O.       • ondansetron (ZOFRAN ODT) dispertab 4 mg  4 mg Oral Q4HRS PRN ROBY Sarmiento Jr..O.       • Pharmacy Consult Request ...Pain Management Review 1 Each  1 Each Other PHARMACY TO DOSE ROBY Sarmiento Jr..O.       • lactated ringers infusion (BOLUS)  500 mL Intravenous Once PRN ROBY Sarmiento Jr..O.       • umeclidinium-vilanterol (ANORO ELLIPTA) inhaler 1 Puff  1 Puff Inhalation QDAILY (RT) ROBY Sarmiento Jr..O.   1 Puff at 07/13/20 0631   • ipratropium-albuterol (DUONEB) nebulizer solution  3 mL Nebulization Q2HRS PRN (RT) Heber Armstrong M.D.   3 mL at 07/02/20 0317   • heparin injection 2,400 Units  2,400 Units Intracatheter ACUTE DIALYSIS PRN Eric Pompa M.D.   2,400 Units at 07/08/20 1648   • insulin regular (HUMULIN R) injection 1-6 Units  1-6 Units Subcutaneous 4X/DAY ACHS Bob Glover M.D.   Stopped at 07/13/20 0800    And   • glucose 4 g chewable tablet 16 g  16 g Oral Q15 MIN PRN Bob Glover M.D.        And   • dextrose 50% (D50W) injection 50 mL  50 mL Intravenous Q15 MIN PRN Bob Glover M.D.             Assessment/Plan  69 y.o. female with a history of CKD 3,  COPD who presented 6/30/2020 with shortness of breath, found to have bradycardia, hypotension, and HAN.     1.  HAN on CKD stage III : Creatinine is worse today, might be secondary to prerenal component from decreased cardiac output  2.  HTN: Blood pressure on the low side  3.  Volume: Improved  4.  Hyponatremia.   5.  Anemia: d stable   6.  Cardiogenic shock.   Recs:   no acute need for HD  Increase diuretics  Renal diet  Daily labs  Renal dose all meds  Avoid nephrotoxins  Prognosis guarded.

## 2020-07-14 ENCOUNTER — PATIENT OUTREACH (OUTPATIENT)
Dept: HEALTH INFORMATION MANAGEMENT | Facility: OTHER | Age: 70
End: 2020-07-14

## 2020-07-14 LAB
ALBUMIN SERPL BCP-MCNC: 3.2 G/DL (ref 3.2–4.9)
ALBUMIN/GLOB SERPL: 1.8 G/DL
ALP SERPL-CCNC: 71 U/L (ref 30–99)
ALT SERPL-CCNC: 130 U/L (ref 2–50)
ANION GAP SERPL CALC-SCNC: 13 MMOL/L (ref 7–16)
AST SERPL-CCNC: 99 U/L (ref 12–45)
BILIRUB SERPL-MCNC: 0.5 MG/DL (ref 0.1–1.5)
BUN SERPL-MCNC: 47 MG/DL (ref 8–22)
CALCIUM SERPL-MCNC: 8.5 MG/DL (ref 8.5–10.5)
CHLORIDE SERPL-SCNC: 97 MMOL/L (ref 96–112)
CO2 SERPL-SCNC: 24 MMOL/L (ref 20–33)
CREAT SERPL-MCNC: 2.74 MG/DL (ref 0.5–1.4)
GLOBULIN SER CALC-MCNC: 1.8 G/DL (ref 1.9–3.5)
GLUCOSE BLD-MCNC: 108 MG/DL (ref 65–99)
GLUCOSE BLD-MCNC: 121 MG/DL (ref 65–99)
GLUCOSE BLD-MCNC: 134 MG/DL (ref 65–99)
GLUCOSE BLD-MCNC: 159 MG/DL (ref 65–99)
GLUCOSE BLD-MCNC: 163 MG/DL (ref 65–99)
GLUCOSE BLD-MCNC: 214 MG/DL (ref 65–99)
GLUCOSE SERPL-MCNC: 109 MG/DL (ref 65–99)
POTASSIUM SERPL-SCNC: 5.4 MMOL/L (ref 3.6–5.5)
PROT SERPL-MCNC: 5 G/DL (ref 6–8.2)
SODIUM SERPL-SCNC: 134 MMOL/L (ref 135–145)

## 2020-07-14 PROCEDURE — A9270 NON-COVERED ITEM OR SERVICE: HCPCS | Performed by: INTERNAL MEDICINE

## 2020-07-14 PROCEDURE — A9270 NON-COVERED ITEM OR SERVICE: HCPCS | Performed by: HOSPITALIST

## 2020-07-14 PROCEDURE — 99232 SBSQ HOSP IP/OBS MODERATE 35: CPT | Performed by: INTERNAL MEDICINE

## 2020-07-14 PROCEDURE — 700101 HCHG RX REV CODE 250: Performed by: INTERNAL MEDICINE

## 2020-07-14 PROCEDURE — 97530 THERAPEUTIC ACTIVITIES: CPT

## 2020-07-14 PROCEDURE — 99233 SBSQ HOSP IP/OBS HIGH 50: CPT | Performed by: INTERNAL MEDICINE

## 2020-07-14 PROCEDURE — 94640 AIRWAY INHALATION TREATMENT: CPT

## 2020-07-14 PROCEDURE — 770020 HCHG ROOM/CARE - TELE (206)

## 2020-07-14 PROCEDURE — 82962 GLUCOSE BLOOD TEST: CPT | Mod: 91

## 2020-07-14 PROCEDURE — 80053 COMPREHEN METABOLIC PANEL: CPT

## 2020-07-14 PROCEDURE — 97535 SELF CARE MNGMENT TRAINING: CPT

## 2020-07-14 PROCEDURE — 700102 HCHG RX REV CODE 250 W/ 637 OVERRIDE(OP): Performed by: INTERNAL MEDICINE

## 2020-07-14 PROCEDURE — 97116 GAIT TRAINING THERAPY: CPT

## 2020-07-14 PROCEDURE — 94760 N-INVAS EAR/PLS OXIMETRY 1: CPT

## 2020-07-14 PROCEDURE — 700102 HCHG RX REV CODE 250 W/ 637 OVERRIDE(OP): Performed by: HOSPITALIST

## 2020-07-14 RX ORDER — FUROSEMIDE 40 MG/1
80 TABLET ORAL
Status: DISCONTINUED | OUTPATIENT
Start: 2020-07-14 | End: 2020-07-22 | Stop reason: HOSPADM

## 2020-07-14 RX ADMIN — INSULIN HUMAN 2 UNITS: 100 INJECTION, SOLUTION PARENTERAL at 11:38

## 2020-07-14 RX ADMIN — UMECLIDINIUM BROMIDE AND VILANTEROL TRIFENATATE 1 PUFF: 62.5; 25 POWDER RESPIRATORY (INHALATION) at 06:09

## 2020-07-14 RX ADMIN — OXYCODONE HYDROCHLORIDE 10 MG: 10 TABLET ORAL at 03:43

## 2020-07-14 RX ADMIN — AMLODIPINE BESYLATE 5 MG: 5 TABLET ORAL at 05:49

## 2020-07-14 RX ADMIN — OXYCODONE HYDROCHLORIDE 10 MG: 10 TABLET ORAL at 11:40

## 2020-07-14 RX ADMIN — FUROSEMIDE 80 MG: 40 TABLET ORAL at 16:36

## 2020-07-14 RX ADMIN — FUROSEMIDE 40 MG: 40 TABLET ORAL at 05:49

## 2020-07-14 RX ADMIN — FLUTICASONE PROPIONATE 88 MCG: 44 AEROSOL, METERED RESPIRATORY (INHALATION) at 06:10

## 2020-07-14 RX ADMIN — IPRATROPIUM BROMIDE AND ALBUTEROL SULFATE 3 ML: .5; 3 SOLUTION RESPIRATORY (INHALATION) at 06:08

## 2020-07-14 RX ADMIN — LEVOTHYROXINE SODIUM 75 MCG: 75 TABLET ORAL at 05:48

## 2020-07-14 RX ADMIN — TRAZODONE HYDROCHLORIDE 300 MG: 150 TABLET ORAL at 05:48

## 2020-07-14 RX ADMIN — OXYCODONE HYDROCHLORIDE 10 MG: 10 TABLET ORAL at 20:18

## 2020-07-14 RX ADMIN — TRAZODONE HYDROCHLORIDE 300 MG: 150 TABLET ORAL at 20:18

## 2020-07-14 RX ADMIN — ROSUVASTATIN CALCIUM 10 MG: 20 TABLET, FILM COATED ORAL at 17:57

## 2020-07-14 RX ADMIN — DULOXETINE HYDROCHLORIDE 60 MG: 60 CAPSULE, DELAYED RELEASE ORAL at 05:49

## 2020-07-14 ASSESSMENT — COGNITIVE AND FUNCTIONAL STATUS - GENERAL
CLIMB 3 TO 5 STEPS WITH RAILING: A LITTLE
WALKING IN HOSPITAL ROOM: A LITTLE
HELP NEEDED FOR BATHING: A LITTLE
DRESSING REGULAR UPPER BODY CLOTHING: A LITTLE
MOBILITY SCORE: 22
DRESSING REGULAR LOWER BODY CLOTHING: A LITTLE
TOILETING: A LITTLE
SUGGESTED CMS G CODE MODIFIER DAILY ACTIVITY: CJ
SUGGESTED CMS G CODE MODIFIER MOBILITY: CJ
DAILY ACTIVITIY SCORE: 20

## 2020-07-14 ASSESSMENT — ENCOUNTER SYMPTOMS
HEADACHES: 0
SHORTNESS OF BREATH: 0
CLAUDICATION: 0
NAUSEA: 0
FOCAL WEAKNESS: 0
FEVER: 0
CHILLS: 0
SORE THROAT: 0
MYALGIAS: 0
WEAKNESS: 1
BACK PAIN: 1
VOMITING: 0
ABDOMINAL PAIN: 0
NECK PAIN: 0
NERVOUS/ANXIOUS: 0
LOSS OF CONSCIOUSNESS: 0
COUGH: 0
DIZZINESS: 0

## 2020-07-14 ASSESSMENT — FIBROSIS 4 INDEX: FIB4 SCORE: 3.34

## 2020-07-14 ASSESSMENT — GAIT ASSESSMENTS
DISTANCE (FEET): 35
ASSISTIVE DEVICE: FRONT WHEEL WALKER
GAIT LEVEL OF ASSIST: SUPERVISED
DEVIATION: BRADYKINETIC

## 2020-07-14 NOTE — PROGRESS NOTES
Hospital Medicine Daily Progress Note    Date of Service  7/14/2020    Chief Complaint  69 y.o. female admitted 6/30/2020 with junctional bradycardia rate 37 with cardiogenic shock, SOB.    Hospital Course    7/2:  This 70 yo female admitted by critical care to ICU for HR 37 junctional rhythm, COPD on 4 LPM NC at home, DM, hypothyroidism, hepatitis C, HTN, CHF, CKD stage 4 followed by Dr. Nava presented with worsening SOB, hypoxia and cough.  She was recently admitted 6/15/20 for COPD exacerbation and pulmonary edema, given amlodopine, metoprolol and lasix.   COVID 19 negative x3.  K was elevated 6.3.  Nephrology consulted and started HD with improvement of HR, BP and SOB.  3 HD sessions performed including today with 2 L removed.  She is feeling better, but still swollen in extremities, on 4 LPM NC baseline O2, ready for dc out of ICU.  Her BP is currently controlled at 97/69, no BP meds given at this point.  K normalized and Cr imporved.  Wbc increased to 23K on IV steroids.  I have started oral taper from solumedrol 60 IV q 12 to 40mg po daily.  It is unclear why patient was started on Rocephin on admission.  I have ordered a urine culture, no UA on admission or culture.  No evidence for pneumonia.  7/3:  Patient still with orthopnea, but no wheeze, wet cough noted today.  No HD today, waiting until tomorrow.  Has temporary triple lumen RIJ.  Wbc decreasing, Cr improving 3.24 to 2.58.  EF 80% but RVP elevated 55-60 with moderate mitral regurg noted. SR 76-92.  7/4:  Remains NSR on monitor.  No SOB, had 1.5 L removed during HD today.  Cr improving, urine output improving daily.  States having productive sputum thick brown, ordered sputum culture.  Lungs CTA b/l.  7/5:  No change, continues on baseline O2 4 LPM NC, no wheeze or crackles.  7/6:  C/o productive sputum, had 5 days of Rocephin, zithromax completed.  Ordered sputum culture, sent today with thick brown.  Wbc increased last 2 days, repeat CXR no  infiltrate, improved pulmonary edema since 6/30 CXR.  + VRE on urine culture, started zyvox bid x 7d ays.*    7/7: Patient reports making 250 mL urine output  Denies any dysuria  Plan for hemodialysis today  On Zyvox  She reports generalized weakness, will likely need placement, from assisted living facility    7/8 we will monitor ins and outs closely  Discussed with nephrology, Dr. Mccabe  Denies dysuria  Generalized weakness, requiring 1 person assist  Encourage activity  Discussed with RN  Baseline oxygen needs of 4 L, will taper as tolerated  Anemia requiring transfusion, will monitor closely  Reports lethargy today    7/9 no new complaints  Patient reports urine output  Per nephrology, will start trial of Lasix  Hemodialysis per nephrology    7/10 improving shortness of breath  Improving urinary output of greater than 1200 mL's  Dialysis per nephrology  Encourage activity    7/11 SBA per staff  Poor urine output, 400 ml/24 hours    7/12  Improved urine output  1800ml/24 hours  -deconditioned, snf referral  Encourage IS use    Improved urine output, for HD catheter removal per nephro    7/13 poor urine output overnight, 585/20 4 hours  On Lasix  Potassium of 5.4    7/14: Pt seen and examined no acute events overnight, feels SOB, denies any chest pain. No nausea or vomiting.  Nephrology following, no need for dialysis anymore  Appreciate rec.     Consultants/Specialty  Nephrology   Critical care    Code Status  DNAR, I ok    Disposition   getting HD per nephrology   Hep C +, no treatment yet, patient understands the benefits of treatment and will need to pursue outpatient tx.  Resides at [facility].  OT/PT no needs.  Baseline O2 5 LPM NC.    snf referral, CM to assist  Zyvox through July 13        Review of Systems  Review of Systems   Constitutional: Negative for chills and fever.   HENT: Negative for sore throat.    Respiratory: Negative for cough and shortness of breath.    Cardiovascular: Positive for leg swelling.  Negative for chest pain and claudication.   Gastrointestinal: Negative for abdominal pain.   Genitourinary: Negative for dysuria.   Musculoskeletal: Negative for myalgias and neck pain.   Neurological: Positive for weakness. Negative for dizziness, focal weakness, loss of consciousness and headaches.   Psychiatric/Behavioral: The patient is not nervous/anxious.         Physical Exam  Temp:  [36.1 °C (97 °F)-36.8 °C (98.2 °F)] 36.7 °C (98 °F)  Pulse:  [61-80] 65  Resp:  [16-24] 18  BP: (114-136)/(44-98) 114/56  SpO2:  [93 %-98 %] 93 %    Physical Exam  Vitals signs and nursing note reviewed.   Constitutional:       General: She is not in acute distress.     Appearance: She is well-developed. She is not ill-appearing or toxic-appearing.   HENT:      Head: Normocephalic and atraumatic.      Nose: Nose normal.      Mouth/Throat:      Pharynx: No oropharyngeal exudate.   Eyes:      Extraocular Movements: Extraocular movements intact.      Pupils: Pupils are equal, round, and reactive to light.   Neck:      Musculoskeletal: Normal range of motion and neck supple.      Thyroid: No thyromegaly.      Vascular: No JVD.      Trachea: No tracheal deviation.   Cardiovascular:      Rate and Rhythm: Normal rate.      Pulses: Normal pulses.   Pulmonary:      Effort: Pulmonary effort is normal. No respiratory distress.      Breath sounds: Normal breath sounds. No wheezing.   Abdominal:      General: Bowel sounds are normal. There is no distension.      Palpations: Abdomen is soft.   Musculoskeletal: Normal range of motion.         General: Swelling present. No tenderness.   Skin:     General: Skin is warm and dry.      Coloration: Skin is not pale.   Neurological:      Mental Status: She is alert and oriented to person, place, and time.      Cranial Nerves: No cranial nerve deficit.      Motor: Weakness present. No abnormal muscle tone.   Psychiatric:         Thought Content: Thought content normal.         Fluids    Intake/Output  Summary (Last 24 hours) at 7/14/2020 1219  Last data filed at 7/14/2020 0349  Gross per 24 hour   Intake --   Output 1800 ml   Net -1800 ml       Laboratory  Recent Labs     07/12/20  0330   WBC 10.4   RBC 2.69*   HEMOGLOBIN 8.3*   HEMATOCRIT 26.4*   MCV 98.1*   MCH 30.9   MCHC 31.4*   RDW 51.4*   PLATELETCT 182   MPV 8.6*     Recent Labs     07/12/20  0330 07/13/20  0155 07/14/20  0150   SODIUM 128* 133* 134*   POTASSIUM 5.2 5.4 5.4   CHLORIDE 93* 95* 97   CO2 25 25 24   GLUCOSE 132* 115* 109*   BUN 44* 48* 47*   CREATININE 2.35* 2.85* 2.74*   CALCIUM 8.7 8.9 8.5                   Imaging  DX-CHEST-PORTABLE (1 VIEW)   Final Result         No significant interval change.      Unchanged diffuse interstitial prominence.      EC-ECHOCARDIOGRAM COMPLETE W/O CONT   Final Result      US-RUQ   Final Result      Cholelithiasis with mild gallbladder wall thickening but no positive sonographic Troncoso sign. This could indicate chronic cholecystitis or incomplete distention      DX-CHEST-PORTABLE (1 VIEW)   Final Result      1.  Satisfactory position of new right IJV multilumen dialysis catheter. No pneumothorax identified.      2.  Unchanged mild volume overload pattern.      DX-CHEST-PORTABLE (1 VIEW)   Final Result      No acute cardiac or pulmonary abnormality is noted. Stable cardiomegaly with increased interstitial opacity diffusely.           Assessment/Plan  Hyperkalemia- (present on admission)  Assessment & Plan  Resolved with HD.    Acute renal failure superimposed on stage 4 chronic kidney disease (HCC)- (present on admission)  Assessment & Plan  Had recent admission, given lasix at discharge and new BP meds, metoprolol and norvasc.  HR 37 and low BP on admission.  Had HD emergently x 3 with improvement of SOB, 1 L off yesterday, 2 L off today.  Dr. Pompa following for HD ongoing determination, for now plans on TTS schedule.  Will need temporary HD catheter placed prior to dc and HD chair set up.  Patient states she  gets better, then goes back to SOB for several months off and on.       7/7 HD per nephro, monitoring for renal recovery  7/8  S/p HD, ?outpatient HD, nephro to arrange  Need to arrange ride to HD 3x/weekly from [facility] vs snf  CM to assist  - encourage activity, OOB bid  On 5L O2    7/9 s/p HD (7/8) with hypotension, now resovled  - HD per nephrology  Improving BNP    7/10 bp stable, UOP 1250 ml  nephro following, ?diuretics vs ongoing HD  Appreciate input    7/11 on lasix per neprhology  Decreased u.o 400 ml/24 hours  - monitor  HD per neprho    7/12 U.O 1800ml/24 hours  - K5.2, HD per nephrology  On lasix    7/13 u.o. 585/24 hours  - K 5.4, increasing CR 2.85  Nephrology following  On lasix  - now off HD    - snf referral, CM assisting    Pneumonia due to infectious organism  Assessment & Plan  Had been treated with 5 days rocephin, 3 days zithromax since admission.  CXR improved edema, no definite infiltrate to suggest pneumonia  C/o productive sputum, ordered sputum culture, hold on abx for now.    Leukocytosis- (present on admission)  Assessment & Plan  2/2 steroids.    Acute exacerbation of chronic obstructive pulmonary disease (COPD) (HCC)- (present on admission)  Assessment & Plan  Patient admitted with COPD exacerbation  7/2 tapering solumedrol IV to po prednisone 40 mg daily x 4 days.  7/4:  Sputum culture ordered for productive cough.  7/10: 99 on 5L, taper to baseline needs as tolerated    Wean of O2 as tolerated     DM type 2, uncontrolled, with renal complications (HCC)- (present on admission)  Assessment & Plan  SSI  Diabetic, renal diet.    UTI (urinary tract infection) due to Enterococcus  Assessment & Plan  VRE on urine culture ss zyvox  Increasing wbc daily.  7/6:  Treat zyvox x 7 days.    7/7 ongoing wbc  F/u am labs    7/8 improving wbc  Cont zyvox 7/13    Renovascular hypertension  Assessment & Plan  Ordered PRN BP meds for sbp >160 or DBP >90 hydralazine.  Monitor  Avoid BB since she went into  cardiogenic shock 2/2 bradycardia.    Chronic hepatitis C without hepatic coma (HCC)- (present on admission)  Assessment & Plan  Will need outpatient treatment.  Elevated LFTs.    Junctional bradycardia- (present on admission)  Assessment & Plan  Combination of hyperkalemia, acute on CKD4 and metoprolol, norvasc  Now resolved s/p HD.    Acute on chronic respiratory failure with hypoxia (HCC)- (present on admission)  Assessment & Plan  Initially admitted to ICU for respiratory support.  No intubation is noted in the record.    Anemia- (present on admission)  Assessment & Plan  2/2 CKD stage 4.    7/8 hgb 6.6, plan for transfusion today  - f/u am cbc, monitor    7/9 s/p 2 u prbc, hgb 9  F/u am labs    7/10 monitor cbc  Significant hypotension noted with anemia and HD, s/p transfusion  hgb 9 to 8, monitor    7/11 hgb 8 to 7.9, monitor  F/u am labs    7/12 h/h stable, monitor  Transfuse prn    Pulmonary hypertension (HCC)- (present on admission)  Assessment & Plan  EF 80%  RVP elevated 55-60%.  Likely will need some diuretic for pulmonary edema.  Currently on HD with 2 L removed on 7/3.      Hyponatremia  Assessment & Plan  7/12 - ARF, on HD per nephrology  - monitor    Tobacco dependence- (present on admission)  Assessment & Plan  Recommend cessation    Chronic use of opiate drug for therapeutic purpose- (present on admission)  Assessment & Plan  On oxycodone 10 mg tid at home, continued in hospital    EDITH (obstructive sleep apnea)- (present on admission)  Assessment & Plan  ongoing    Hypothyroidism- (present on admission)  Assessment & Plan  Continue home meds.    Essential hypertension- (present on admission)  Assessment & Plan  7/9 uncontrolled, with intermittent hypotension with HD  Monitor closely, add norvasc with holding parameters       VTE prophylaxis: heparin

## 2020-07-14 NOTE — CARE PLAN
Problem: Safety  Goal: Will remain free from injury  Outcome: PROGRESSING AS EXPECTED  Goal: Will remain free from falls  Outcome: PROGRESSING AS EXPECTED     Problem: Infection  Goal: Will remain free from infection  Outcome: PROGRESSING AS EXPECTED     Problem: Bowel/Gastric:  Goal: Normal bowel function is maintained or improved  Outcome: PROGRESSING AS EXPECTED  Goal: Will not experience complications related to bowel motility  Outcome: PROGRESSING AS EXPECTED     Problem: Discharge Barriers/Planning  Goal: Patient's continuum of care needs will be met  Outcome: PROGRESSING AS EXPECTED     Problem: Pain Management  Goal: Pain level will decrease to patient's comfort goal  Outcome: PROGRESSING AS EXPECTED     Problem: Respiratory:  Goal: Respiratory status will improve  Outcome: PROGRESSING AS EXPECTED

## 2020-07-14 NOTE — THERAPY
07/14/20 1000   Other Treatments   Other Treatments Provided Pt encouraged to ambulate to toilet instead of using commode; pt encouraged to ambulate with [facility] staff at least 3x/day to prevent further deconditioning   Gait Analysis   Gait Level Of Assist Supervised   Assistive Device Front Wheel Walker   Distance (Feet) 35   # of Times Distance was Traveled 1   Deviation Bradykinetic   Weight Bearing Status fwb   Skilled Intervention Verbal Cuing   Comments cues to maintain upright posture and encouraged deep breathing and standing rest breaks to improve activity tolerance    Bed Mobility    Supine to Sit Supervised   Sit to Supine Supervised   Scooting Supervised   Rolling Supervised   Skilled Intervention Verbal Cuing   Functional Mobility   Sit to Stand Supervised   Bed, Chair, Wheelchair Transfer Supervised   Skilled Intervention Verbal Cuing   Comments required seated rest break priro to going back to supine   Patient / Family Goals    Patient / Family Goal #1 to go back home   Short Term Goals    Short Term Goal # 1 pt will be able to maintain SpO2 of 90% or greater with all functional mobility in 6tx for safe functional activity tolerance to return back to [facility]    Goal Outcome # 1 goal not met   Anticipated Discharge Equipment   DC Equipment None

## 2020-07-14 NOTE — THERAPY
Occupational Therapy  Daily Treatment     Patient Name: Priya Callahan  Age:  69 y.o., Sex:  female  Medical Record #: [id number]  Today's Date: 7/14/2020     Precautions  Precautions: Fall Risk  Comments: dec spO2 with activity    Assessment    Pt appears to be functioning at baseline for ADLs, ADL transfers, and functional mobility. Pt mostly limited by SOB and impaired activity tolerance. Pt did require assistance for rear pericare during toileting, however states that is caused by IV placement and hat in toilet. Pt was able to complete front pericare without assistance and reports she is able to reach rear through front now that IV/hat is removed.    Plan    Continue current treatment plan. Patient will not be actively followed for occupational therapy services at this time, however may be seen if requested by physician for 1 more visit within 30 days to address any discharge or equipment needs.     Discharge recommendations: Recommend home health for continued occupational therapy services.     Subjective    Pt alert, pleasant, and agreeable to OT treatment.      Objective       07/14/20 1123   Balance   Comments with FWW, no overt LOB   Bed Mobility    Supine to Sit Supervised   Activities of Daily Living   Grooming Supervision;Standing   Toileting Moderate Assist  (for rear pericare)   Comments Pt states she can complete rear pericare through front once IV removed and hat removed from toilet. Pt was able to complete front pericare without assistance.   Functional Mobility   Sit to Stand Supervised   Toilet Transfers Supervised   Activity Tolerance   Comments limited by SOB, requires rest breaks   Patient / Family Goals   Patient / Family Goal #1 To go home   Anticipated Discharge Equipment   DC Equipment None

## 2020-07-14 NOTE — DISCHARGE PLANNING
Anticipated Discharge Disposition: [facility] w/     Action: Spoke with PT re: patient plan for dc.  They assessed patient this am and stated that she is appropriate for long term w/.  They do not feel she has any need to go to a SNF at this point.    Barriers to Discharge: no CM barriers; per MD in IDT rounds, is not medically clear.    Plan: Patient will return to [facility] w/ [facility] once medically stable.

## 2020-07-14 NOTE — PROGRESS NOTES
Nephrology Daily Progress Note    Date of Service  7/14/2020    Chief Complaint  69 y.o. female with a history of CKD 3, COPD who presented 6/30/2020 with shortness of breath, found to have bradycardia, hypotension, and HAN.    Interval Problem Update  7/1 -patient had dialysis yesterday with no fluid removed, and resolution of shock on completion of dialysis.  Patient had 500 mL of urine output documented last 24 hours.  Patient had dialysis again this morning with 1 L removed.  Patient says her breathing is better.  Denies chest pain, shortness of breath at this time.  7/3 -patient had third session of dialysis yesterday with 2 L removed.  Tolerated well.  Denies chest pain this morning.  Complains of continued shortness of breath, but improved from admission.  7/5 -patient had dialysis yesterday with 1.5 L removed.  Patient says she is urinating, but no urine output being recorded.  Patient says her shortness of breath is still there, but better compared to admission.  Patient denies chest pain.  7/6 -doing better, less SOB  Scheduled for HD TTS  7/9 -doing well, improved SOB  UOP 1500 cc/24 H  Holding dialysis -watching for recovery  7/10 -doing well  Edema improving  SOB better  Good UOP  Holding dialysis  7/11 -doing well, no complaints  SOB and edema improved  Good UOP  Off HD  7/12 -no acute events, no complaints  Good UOP  Creat improving  Off HD  7/13 patient is doing better , still dyspnea on exertion , no chest pain, creatinine is worse.  7/14 patient is complaining of back pain, no chest pain  review of Systems  Review of Systems   Constitutional: Negative for chills, fever and malaise/fatigue.   Respiratory: Negative for cough and shortness of breath.    Cardiovascular: Negative for chest pain and leg swelling.   Gastrointestinal: Negative for nausea and vomiting.   Genitourinary: Negative for dysuria, frequency and urgency.   Musculoskeletal: Positive for back pain.   All other systems reviewed and  are negative.       Physical Exam  Temp:  [36.1 °C (97 °F)-36.8 °C (98.2 °F)] 36.7 °C (98 °F)  Pulse:  [61-80] 65  Resp:  [16-24] 18  BP: (114-136)/(44-98) 114/56  SpO2:  [82 %-98 %] 93 %    Physical Exam  Vitals signs and nursing note reviewed.   Constitutional:       General: She is awake. She is not in acute distress.     Appearance: She is well-developed. She is ill-appearing. She is not diaphoretic.   HENT:      Head: Normocephalic and atraumatic.      Right Ear: External ear normal.      Left Ear: External ear normal.      Nose: Nose normal. No rhinorrhea.      Mouth/Throat:      Pharynx: No oropharyngeal exudate or posterior oropharyngeal erythema.   Eyes:      General: No scleral icterus.        Right eye: No discharge.         Left eye: No discharge.      Conjunctiva/sclera: Conjunctivae normal.   Neck:      Musculoskeletal: No neck rigidity or muscular tenderness.      Vascular: No carotid bruit.   Cardiovascular:      Rate and Rhythm: Normal rate and regular rhythm.      Heart sounds: No murmur.   Pulmonary:      Effort: Pulmonary effort is normal. No respiratory distress.      Breath sounds: Normal breath sounds.   Abdominal:      General: Abdomen is flat. There is no distension.      Palpations: Abdomen is soft. There is no mass.   Musculoskeletal:      Right lower leg: No edema.      Left lower leg: No edema.   Skin:     General: Skin is warm and dry.      Coloration: Skin is not jaundiced.   Neurological:      General: No focal deficit present.      Mental Status: She is alert and oriented to person, place, and time. Mental status is at baseline.   Psychiatric:         Behavior: Behavior normal.     Access: Right IJ temporary dialysis catheter.      Fluids    Intake/Output Summary (Last 24 hours) at 7/14/2020 1446  Last data filed at 7/14/2020 0349  Gross per 24 hour   Intake --   Output 1800 ml   Net -1800 ml       Laboratory  Labs reviewed, pertinent labs below.  Recent Labs     07/12/20  0330   WBC  10.4   RBC 2.69*   HEMOGLOBIN 8.3*   HEMATOCRIT 26.4*   MCV 98.1*   MCH 30.9   MCHC 31.4*   RDW 51.4*   PLATELETCT 182   MPV 8.6*     Recent Labs     07/12/20  0330 07/13/20  0155 07/14/20  0150   SODIUM 128* 133* 134*   POTASSIUM 5.2 5.4 5.4   CHLORIDE 93* 95* 97   CO2 25 25 24   GLUCOSE 132* 115* 109*   BUN 44* 48* 47*   CREATININE 2.35* 2.85* 2.74*   CALCIUM 8.7 8.9 8.5               URINALYSIS:  Lab Results   Component Value Date/Time    COLORURINE DK Yellow 06/15/2019 0245    CLARITY Turbid (A) 06/15/2019 0245    SPECGRAVITY 1.020 06/15/2019 0245    PHURINE 5.0 06/15/2019 0245    KETONES Trace (A) 06/15/2019 0245    PROTEINURIN 300 (A) 06/15/2019 0245    BILIRUBINUR Negative 06/15/2019 0245    UROBILU 1.0 06/15/2019 0245    NITRITE Negative 06/15/2019 0245    LEUKESTERAS Negative 06/15/2019 0245    OCCULTBLOOD Negative 06/15/2019 0245     UPC  No results found for: TOTPROTUR No results found for: CREATININEU      Imaging reviewed  DX-CHEST-PORTABLE (1 VIEW)   Final Result         No significant interval change.      Unchanged diffuse interstitial prominence.      EC-ECHOCARDIOGRAM COMPLETE W/O CONT   Final Result      US-RUQ   Final Result      Cholelithiasis with mild gallbladder wall thickening but no positive sonographic Troncoso sign. This could indicate chronic cholecystitis or incomplete distention      DX-CHEST-PORTABLE (1 VIEW)   Final Result      1.  Satisfactory position of new right IJV multilumen dialysis catheter. No pneumothorax identified.      2.  Unchanged mild volume overload pattern.      DX-CHEST-PORTABLE (1 VIEW)   Final Result      No acute cardiac or pulmonary abnormality is noted. Stable cardiomegaly with increased interstitial opacity diffusely.            Current Facility-Administered Medications   Medication Dose Route Frequency Provider Last Rate Last Dose   • furosemide (LASIX) tablet 40 mg  40 mg Oral BID DIURETIC Fadi Najjar, M.D.   40 mg at 07/14/20 0549   • fluticasone (FLOVENT  HFA) 44 MCG/ACT inhaler 88 mcg  2 Puff Inhalation QDAILY (RT) Roger Florence Jr., D.O.   88 mcg at 07/14/20 0610   • amLODIPine (NORVASC) tablet 5 mg  5 mg Oral Q DAY ROBY Dimas.O.   5 mg at 07/14/20 0549   • heparin injection 1,500 Units  1,500 Units Intravenous DIALYSIS PRN Rosi Bansal M.D.   1,500 Units at 07/08/20 1430   • ipratropium-albuterol (DUONEB) nebulizer solution  3 mL Nebulization Q2HRS PRN (RT) Christina Calderon M.D.       • hydrALAZINE (APRESOLINE) tablet 25 mg  25 mg Oral Q8HRS PRN Christina Calderon M.D.   25 mg at 07/10/20 1647   • heparin injection 2,000 Units  2,000 Units Intravenous DIALYSIS PRN Eric Pompa M.D.   2,000 Units at 07/07/20 1439   • oxyCODONE immediate release (ROXICODONE) tablet 10 mg  10 mg Oral TID PRN Christina Calderon M.D.   10 mg at 07/14/20 1140   • lactulose 20 GM/30ML solution 30 mL  30 mL Oral BID W/MEALS PRN Heber Armstrong M.D.       • NS (BOLUS) infusion 250 mL  250 mL Intravenous DIALYSIS PRN Eric Pompa M.D.       • albumin human 25% solution 12.5 g  12.5 g Intravenous DIALYSIS PRN Eric Pompa M.D. 150 mL/hr at 06/30/20 1513 12.5 g at 06/30/20 1513   • Respiratory Therapy Consult   Nebulization Continuous RT Roger Florence Jr., D.O.       • DULoxetine (CYMBALTA) capsule 60 mg  60 mg Oral DAILY Roger Florence Jr., D.O.   60 mg at 07/14/20 0549   • hydrOXYzine HCl (ATARAX) tablet 25 mg  25 mg Oral TID PRN Roger Florence Jr., D.O.   25 mg at 07/10/20 1642   • levothyroxine (SYNTHROID) tablet 75 mcg  75 mcg Oral AM ES Roger Florence Jr., D.O.   75 mcg at 07/14/20 0548   • lidocaine (LIDODERM) 5 % 1 Patch  1 Patch Transdermal Q24HRS PRN Roger Florence Jr., D.O.   1 Patch at 07/04/20 2211   • traZODone (DESYREL) tablet 300 mg  300 mg Oral BID Roger Florence Jr., D.O.   300 mg at 07/14/20 0548   • rosuvastatin (CRESTOR) tablet 10 mg  10 mg Oral Q EVENING Roger Florence Jr., D.O.   10 mg at 07/13/20 1718   • senna-docusate (PERICOLACE or SENOKOT S) 8.6-50 MG per tablet  2 Tab  2 Tab Oral BID ROBY Sarmiento Jr..OMelonie   Stopped at 07/12/20 0600    And   • polyethylene glycol/lytes (MIRALAX) PACKET 1 Packet  1 Packet Oral QDAY PRN ROBY Sarmiento Jr..O.   1 Packet at 07/02/20 0500    And   • magnesium hydroxide (MILK OF MAGNESIA) suspension 30 mL  30 mL Oral QDAY PRN ROBY Sarmiento Jr..OMelonie        And   • bisacodyl (DULCOLAX) suppository 10 mg  10 mg Rectal QDAY PRN ROBY Sarmiento Jr..O.       • [Held by provider] heparin injection 5,000 Units  5,000 Units Subcutaneous Q8HRS ROBY Sarmiento Jr..OMelonie   Stopped at 07/10/20 2200   • ondansetron (ZOFRAN) syringe/vial injection 4 mg  4 mg Intravenous Q4HRS PRN ROBY Sarmiento Jr..O.       • ondansetron (ZOFRAN ODT) dispertab 4 mg  4 mg Oral Q4HRS PRN ROBY Sarmiento Jr..O.       • Pharmacy Consult Request ...Pain Management Review 1 Each  1 Each Other PHARMACY TO DOSE ROBY Sarmiento Jr..LANCE.       • lactated ringers infusion (BOLUS)  500 mL Intravenous Once PRN ROBY Sarmiento Jr..O.       • umeclidinium-vilanterol (ANORO ELLIPTA) inhaler 1 Puff  1 Puff Inhalation QDAILY (RT) ROBY Sarmiento Jr..OMelonie   1 Puff at 07/14/20 0609   • ipratropium-albuterol (DUONEB) nebulizer solution  3 mL Nebulization Q2HRS PRN (RT) Heber Armstrong M.D.   3 mL at 07/14/20 0608   • heparin injection 2,400 Units  2,400 Units Intracatheter ACUTE DIALYSIS PRN Eric Pompa M.D.   2,400 Units at 07/08/20 1648   • insulin regular (HUMULIN R) injection 1-6 Units  1-6 Units Subcutaneous 4X/DAY ACHS Bob Glover M.D.   2 Units at 07/14/20 1138    And   • glucose 4 g chewable tablet 16 g  16 g Oral Q15 MIN PRN Bob Glover M.D.        And   • dextrose 50% (D50W) injection 50 mL  50 mL Intravenous Q15 MIN PRN Bob Glover M.D.             Assessment/Plan  69 y.o. female with a history of CKD 3, COPD who presented 6/30/2020 with shortness of breath, found to have bradycardia, hypotension, and HAN.     1.  HAN on CKD stage III :  Creatinine still elevated, most likely cardiorenal syndrome  2.  HTN: Blood pressure on the low side  3.  Volume: Improved  4.  Hyponatremia.   5.  Anemia: d stable   6.  Cardiogenic shock.   Recs:   no acute need for HD, continue to evaluate daily  Increase diuretics   renal diet  Daily labs  Renal dose all meds  Avoid nephrotoxins  Prognosis guarded.

## 2020-07-14 NOTE — PROGRESS NOTES
Received bedside report from RN, pt care assumed, VSS, pt assessment complete. Pt AAOx4, 6/10 pain at this time. Pt to receive medication for pain. No signs of acute distress noted at this time. POC discussed with pt and verbalizes no questions. Pt denies any additional needs at this time. Bed in lowest position, pt educated on fall risk and verbalized understanding, call light within reach, hourly rounding initiated.

## 2020-07-14 NOTE — THERAPY
"Physical Therapy   Re-assessment      Patient Name: Priya Callahan  Age:  69 y.o., Sex:  female  Medical Record #: [id number]  Today's Date: 7/14/2020     Precautions: Fall Risk    Assessment    Physical therapy had recently signed off on patient during evaluation as pt was reported to be mobilizing at SPV level and ambulate for 30ft (pt baseline). Case management requested for updated notes to ensure pt's therapy needs at this time. Pt presented to PT with poor activity tolerance during re-assessment, however, continues to demonstrates SPV level of functional mobility with bed mobility, ambulation, transfers, and sit<>stands with FWW use in room. Pt reports she ambulates only short distances at her baseline which is just in her room and uses a w/c for long distance mobility. Pt was able to demonstrate 35 ft of ambulation within the room with FWW use with SPV assist. Pt was primarily limited due to dec in SpO2 down to 83% and required cues for deep breathing to bring SpO2 back to 93%. Pt will benefit from skilled PT while in house to assist in energy conservation techniques and improved activity tolerance. Anticipate pt to improve in functional mobility and activity tolerance and d/c back to long term with recs for HH therapy services.     Plan    Treatment plan modified to 3 times per week until therapy goals are met for the following treatments:  Bed Mobility, Community Re-integration, Equipment, Gait Training, Manual Therapy, Neuro Re-Education / Balance, Self Care/Home Evaluation, Sensory Integration Techniques, Stair Training, Therapeutic Activities and Therapeutic Exercises.    Discharge recommendations:  Recommend home health transitional care for continued physical therapy services.     Subjective    \" I can breathe when I am getting to the chair, this is not normal for me\"     Objective       07/14/20 1000   Other Treatments   Other Treatments Provided Pt encouraged to ambulate to toilet instead of using " commode; pt encouraged to ambulate with [facility] staff at least 3x/day to prevent further deconditioning   Gait Analysis   Gait Level Of Assist Supervised   Assistive Device Front Wheel Walker   Distance (Feet) 35   # of Times Distance was Traveled 1   Deviation Bradykinetic   Weight Bearing Status fwb   Skilled Intervention Verbal Cuing   Comments cues to maintain upright posture and encouraged deep breathing and standing rest breaks to improve activity tolerance    Bed Mobility    Supine to Sit Supervised   Sit to Supine Supervised   Scooting Supervised   Rolling Supervised   Skilled Intervention Verbal Cuing   Functional Mobility   Sit to Stand Supervised   Bed, Chair, Wheelchair Transfer Supervised   Skilled Intervention Verbal Cuing   Comments required seated rest break prior to going back to supine   Patient / Family Goals    Patient / Family Goal #1 to go back home   Short Term Goals    Short Term Goal # 1 pt will be able to maintain SpO2 of 90% or greater with all functional mobility in 6tx for safe functional activity tolerance [facility]    Goal Outcome # 1 goal not met   Anticipated Discharge Equipment   DC Equipment None

## 2020-07-14 NOTE — PROGRESS NOTES
Bedside report received from night shift RN. Pt in bed sleeping, wakes easily to voice. No signs or symptoms of resp distress noted or reported on 5 L/min via NC at rest. Bed in low and locked position, call button within reach and fall precautions are in place

## 2020-07-14 NOTE — CARE PLAN
Problem: Safety  Goal: Will remain free from falls  Outcome: PROGRESSING AS EXPECTED  Note: Pt remains free from falls at this time. Safety precautions in place. Pt educated on calling for assistance when needed.        Problem: Knowledge Deficit  Goal: Knowledge of disease process/condition, treatment plan, diagnostic tests, and medications will improve  7/14/2020 1605 by Kailey Honeycutt R.N.  Outcome: PROGRESSING AS EXPECTED  Note: Pt updated on POC, tests, and medications. Pt verbalizes understanding and has no further questions at this time. Pt educated on calling for any more questions.

## 2020-07-15 LAB
ALBUMIN SERPL BCP-MCNC: 3.2 G/DL (ref 3.2–4.9)
ALBUMIN/GLOB SERPL: 1.4 G/DL
ALP SERPL-CCNC: 76 U/L (ref 30–99)
ALT SERPL-CCNC: 154 U/L (ref 2–50)
ANION GAP SERPL CALC-SCNC: 12 MMOL/L (ref 7–16)
AST SERPL-CCNC: 102 U/L (ref 12–45)
BILIRUB SERPL-MCNC: 0.5 MG/DL (ref 0.1–1.5)
BUN SERPL-MCNC: 50 MG/DL (ref 8–22)
CALCIUM SERPL-MCNC: 8.6 MG/DL (ref 8.5–10.5)
CHLORIDE SERPL-SCNC: 97 MMOL/L (ref 96–112)
CO2 SERPL-SCNC: 26 MMOL/L (ref 20–33)
CREAT SERPL-MCNC: 2.82 MG/DL (ref 0.5–1.4)
ERYTHROCYTE [DISTWIDTH] IN BLOOD BY AUTOMATED COUNT: 50.7 FL (ref 35.9–50)
GLOBULIN SER CALC-MCNC: 2.3 G/DL (ref 1.9–3.5)
GLUCOSE BLD-MCNC: 101 MG/DL (ref 65–99)
GLUCOSE BLD-MCNC: 134 MG/DL (ref 65–99)
GLUCOSE BLD-MCNC: 140 MG/DL (ref 65–99)
GLUCOSE BLD-MCNC: 233 MG/DL (ref 65–99)
GLUCOSE SERPL-MCNC: 136 MG/DL (ref 65–99)
HCT VFR BLD AUTO: 27.8 % (ref 37–47)
HGB BLD-MCNC: 8.7 G/DL (ref 12–16)
MCH RBC QN AUTO: 30.5 PG (ref 27–33)
MCHC RBC AUTO-ENTMCNC: 31.3 G/DL (ref 33.6–35)
MCV RBC AUTO: 97.5 FL (ref 81.4–97.8)
PLATELET # BLD AUTO: 109 K/UL (ref 164–446)
PMV BLD AUTO: 8.5 FL (ref 9–12.9)
POTASSIUM SERPL-SCNC: 5.7 MMOL/L (ref 3.6–5.5)
PROT SERPL-MCNC: 5.5 G/DL (ref 6–8.2)
RBC # BLD AUTO: 2.85 M/UL (ref 4.2–5.4)
SODIUM SERPL-SCNC: 135 MMOL/L (ref 135–145)
WBC # BLD AUTO: 8.1 K/UL (ref 4.8–10.8)

## 2020-07-15 PROCEDURE — 80053 COMPREHEN METABOLIC PANEL: CPT

## 2020-07-15 PROCEDURE — A9270 NON-COVERED ITEM OR SERVICE: HCPCS | Performed by: HOSPITALIST

## 2020-07-15 PROCEDURE — 700102 HCHG RX REV CODE 250 W/ 637 OVERRIDE(OP): Performed by: HOSPITALIST

## 2020-07-15 PROCEDURE — A9270 NON-COVERED ITEM OR SERVICE: HCPCS | Performed by: INTERNAL MEDICINE

## 2020-07-15 PROCEDURE — 99232 SBSQ HOSP IP/OBS MODERATE 35: CPT | Performed by: INTERNAL MEDICINE

## 2020-07-15 PROCEDURE — 770020 HCHG ROOM/CARE - TELE (206)

## 2020-07-15 PROCEDURE — 94640 AIRWAY INHALATION TREATMENT: CPT

## 2020-07-15 PROCEDURE — 700102 HCHG RX REV CODE 250 W/ 637 OVERRIDE(OP): Performed by: INTERNAL MEDICINE

## 2020-07-15 PROCEDURE — 82962 GLUCOSE BLOOD TEST: CPT | Mod: 91

## 2020-07-15 PROCEDURE — 85027 COMPLETE CBC AUTOMATED: CPT

## 2020-07-15 PROCEDURE — 94760 N-INVAS EAR/PLS OXIMETRY 1: CPT

## 2020-07-15 PROCEDURE — 99233 SBSQ HOSP IP/OBS HIGH 50: CPT | Performed by: INTERNAL MEDICINE

## 2020-07-15 RX ORDER — NYSTATIN 100000 U/G
CREAM TOPICAL 2 TIMES DAILY
Status: DISCONTINUED | OUTPATIENT
Start: 2020-07-15 | End: 2020-07-22 | Stop reason: HOSPADM

## 2020-07-15 RX ORDER — SODIUM POLYSTYRENE SULFONATE 15 G/60ML
15 SUSPENSION ORAL; RECTAL ONCE
Status: COMPLETED | OUTPATIENT
Start: 2020-07-15 | End: 2020-07-15

## 2020-07-15 RX ORDER — NYSTATIN 100000 [USP'U]/G
POWDER TOPICAL 2 TIMES DAILY
Status: DISCONTINUED | OUTPATIENT
Start: 2020-07-15 | End: 2020-07-15

## 2020-07-15 RX ADMIN — SODIUM POLYSTYRENE SULFONATE 15 G: 15 SUSPENSION ORAL; RECTAL at 08:39

## 2020-07-15 RX ADMIN — AMLODIPINE BESYLATE 5 MG: 5 TABLET ORAL at 05:56

## 2020-07-15 RX ADMIN — TRAZODONE HYDROCHLORIDE 300 MG: 150 TABLET ORAL at 21:36

## 2020-07-15 RX ADMIN — DULOXETINE HYDROCHLORIDE 60 MG: 60 CAPSULE, DELAYED RELEASE ORAL at 05:56

## 2020-07-15 RX ADMIN — ROSUVASTATIN CALCIUM 10 MG: 20 TABLET, FILM COATED ORAL at 17:25

## 2020-07-15 RX ADMIN — FUROSEMIDE 80 MG: 40 TABLET ORAL at 16:18

## 2020-07-15 RX ADMIN — TRAZODONE HYDROCHLORIDE 300 MG: 150 TABLET ORAL at 05:56

## 2020-07-15 RX ADMIN — OXYCODONE HYDROCHLORIDE 10 MG: 10 TABLET ORAL at 05:56

## 2020-07-15 RX ADMIN — UMECLIDINIUM BROMIDE AND VILANTEROL TRIFENATATE 1 PUFF: 62.5; 25 POWDER RESPIRATORY (INHALATION) at 07:45

## 2020-07-15 RX ADMIN — OXYCODONE HYDROCHLORIDE 10 MG: 10 TABLET ORAL at 16:17

## 2020-07-15 RX ADMIN — FUROSEMIDE 80 MG: 40 TABLET ORAL at 06:00

## 2020-07-15 RX ADMIN — INSULIN HUMAN 2 UNITS: 100 INJECTION, SOLUTION PARENTERAL at 12:26

## 2020-07-15 RX ADMIN — FLUTICASONE PROPIONATE 88 MCG: 44 AEROSOL, METERED RESPIRATORY (INHALATION) at 07:46

## 2020-07-15 RX ADMIN — LEVOTHYROXINE SODIUM 75 MCG: 75 TABLET ORAL at 05:56

## 2020-07-15 ASSESSMENT — ENCOUNTER SYMPTOMS
HEADACHES: 0
CHILLS: 0
DIZZINESS: 0
FEVER: 0
MYALGIAS: 0
SHORTNESS OF BREATH: 0
LOSS OF CONSCIOUSNESS: 0
VOMITING: 0
SORE THROAT: 0
WEAKNESS: 1
ABDOMINAL PAIN: 0
CLAUDICATION: 0
SHORTNESS OF BREATH: 1
NECK PAIN: 0
FOCAL WEAKNESS: 0
NERVOUS/ANXIOUS: 1
COUGH: 0
NERVOUS/ANXIOUS: 0
NAUSEA: 0

## 2020-07-15 ASSESSMENT — FIBROSIS 4 INDEX: FIB4 SCORE: 5.28

## 2020-07-15 NOTE — PROGRESS NOTES
Bedside report received from night shift RN. Pt in bed sleeping, wakes easily to voice. No complaints of pain at this time. No signs or symptoms of resp distress noted or reported on 5 L/min via NC at rest. Pt remains on .   Dr Dewey present at bedside and notified that pt had refused her AM dose of lasix and 5.7 potassium. Per Dr Dewey request lasix will be given now

## 2020-07-15 NOTE — DISCHARGE PLANNING
Anticipated Discharge Disposition: [facility] w/     Action: This RN CM received a call from jarred Horner, requesting updates on patient.  Shared with her that patient worked with PT/OT yesterday and that both clinicians stated she is appropriate for returning to [facility] w/ .  She is already established with [facility] for  and will return to [facility] once medically cleared.  She is currently requiring more than her baseline amount of O2 and they are gently diuresing her d/t her CHF.    Barriers to Discharge: None    Plan: CM to follow up with MD in IDT rounds to determine when he thinks patient may be ready to discharge back to [facility].

## 2020-07-15 NOTE — CARE PLAN
Problem: Safety  Goal: Will remain free from injury  Outcome: PROGRESSING AS EXPECTED  Goal: Will remain free from falls  Outcome: PROGRESSING AS EXPECTED     Problem: Respiratory:  Goal: Respiratory status will improve  Outcome: PROGRESSING SLOWER THAN EXPECTED

## 2020-07-15 NOTE — PROGRESS NOTES
Hospital Medicine Daily Progress Note    Date of Service  7/15/2020    Chief Complaint  69 y.o. female admitted 6/30/2020 with junctional bradycardia rate 37 with cardiogenic shock, SOB.    Hospital Course    7/2:  This 68 yo female admitted by critical care to ICU for HR 37 junctional rhythm, COPD on 4 LPM NC at home, DM, hypothyroidism, hepatitis C, HTN, CHF, CKD stage 4 followed by Dr. Nava presented with worsening SOB, hypoxia and cough.  She was recently admitted 6/15/20 for COPD exacerbation and pulmonary edema, given amlodopine, metoprolol and lasix.   COVID 19 negative x3.  K was elevated 6.3.  Nephrology consulted and started HD with improvement of HR, BP and SOB.  3 HD sessions performed including today with 2 L removed.  She is feeling better, but still swollen in extremities, on 4 LPM NC baseline O2, ready for dc out of ICU.  Her BP is currently controlled at 97/69, no BP meds given at this point.  K normalized and Cr imporved.  Wbc increased to 23K on IV steroids.  I have started oral taper from solumedrol 60 IV q 12 to 40mg po daily.  It is unclear why patient was started on Rocephin on admission.  I have ordered a urine culture, no UA on admission or culture.  No evidence for pneumonia.  7/3:  Patient still with orthopnea, but no wheeze, wet cough noted today.  No HD today, waiting until tomorrow.  Has temporary triple lumen RIJ.  Wbc decreasing, Cr improving 3.24 to 2.58.  EF 80% but RVP elevated 55-60 with moderate mitral regurg noted. SR 76-92.  7/4:  Remains NSR on monitor.  No SOB, had 1.5 L removed during HD today.  Cr improving, urine output improving daily.  States having productive sputum thick brown, ordered sputum culture.  Lungs CTA b/l.  7/5:  No change, continues on baseline O2 4 LPM NC, no wheeze or crackles.  7/6:  C/o productive sputum, had 5 days of Rocephin, zithromax completed.  Ordered sputum culture, sent today with thick brown.  Wbc increased last 2 days, repeat CXR no  infiltrate, improved pulmonary edema since 6/30 CXR.  + VRE on urine culture, started zyvox bid x 7d ays.*    7/7: Patient reports making 250 mL urine output  Denies any dysuria  Plan for hemodialysis today  On Zyvox  She reports generalized weakness, will likely need placement, from assisted living facility  7/8 we will monitor ins and outs closely  Discussed with nephrology, Dr. Mccabe  Denies dysuria  Generalized weakness, requiring 1 person assist  Encourage activity  Discussed with RN  Baseline oxygen needs of 4 L, will taper as tolerated  Anemia requiring transfusion, will monitor closely  Reports lethargy today  7/9 no new complaints  Patient reports urine output  Per nephrology, will start trial of Lasix  Hemodialysis per nephrology  7/10 improving shortness of breath  Improving urinary output of greater than 1200 mL's  Dialysis per nephrology  Encourage activity  7/11 SBA per staff  Poor urine output, 400 ml/24 hours  7/12  Improved urine output  1800ml/24 hours  -deconditioned, snf referral  Encourage IS use  Improved urine output, for HD catheter removal per nephro  7/13 poor urine output overnight, 585/20 4 hours  On Lasix  Potassium of 5.4    7/14: Pt seen and examined no acute events overnight, feels SOB, denies any chest pain. No nausea or vomiting.  Nephrology following, no need for dialysis anymore  Appreciate rec.     7/15Pt resting in bed still with SOB, refused lasix this AM, discussed with pt regarding it and now agrees to take it.  Afebrile, potassium 5.7 this AM, will give a dose of kayexalate  Nephrology also following appreciate rec    Consultants/Specialty  Nephrology   Critical care    Code Status  DNAR, I ok    Disposition   getting HD per nephrology   Hep C +, no treatment yet, patient understands the benefits of treatment and will need to pursue outpatient tx.  Resides at [facility].  OT/PT no needs.  Baseline O2 5 LPM NC.    snf referral, CM to assist  Zyvox through July 13        Review of  Systems  Review of Systems   Constitutional: Negative for chills and fever.   HENT: Negative for sore throat.    Respiratory: Negative for cough and shortness of breath.    Cardiovascular: Positive for leg swelling. Negative for chest pain and claudication.   Gastrointestinal: Negative for abdominal pain.   Genitourinary: Negative for dysuria.   Musculoskeletal: Negative for myalgias and neck pain.   Neurological: Positive for weakness. Negative for dizziness, focal weakness, loss of consciousness and headaches.   Psychiatric/Behavioral: The patient is not nervous/anxious.         Physical Exam  Temp:  [36.4 °C (97.6 °F)-36.7 °C (98 °F)] 36.6 °C (97.9 °F)  Pulse:  [65-96] 72  Resp:  [16-22] 20  BP: (114-145)/(34-98) 130/34  SpO2:  [82 %-96 %] 94 %    Physical Exam  Vitals signs and nursing note reviewed.   Constitutional:       General: She is not in acute distress.     Appearance: She is well-developed. She is not ill-appearing or toxic-appearing.   HENT:      Head: Normocephalic and atraumatic.      Nose: Nose normal.      Mouth/Throat:      Pharynx: No oropharyngeal exudate.   Eyes:      Extraocular Movements: Extraocular movements intact.      Pupils: Pupils are equal, round, and reactive to light.   Neck:      Musculoskeletal: Normal range of motion and neck supple.      Thyroid: No thyromegaly.      Vascular: No JVD.      Trachea: No tracheal deviation.   Cardiovascular:      Rate and Rhythm: Normal rate.      Pulses: Normal pulses.   Pulmonary:      Effort: Pulmonary effort is normal. No respiratory distress.      Breath sounds: Normal breath sounds. No wheezing.   Abdominal:      General: Bowel sounds are normal. There is no distension.      Palpations: Abdomen is soft.   Musculoskeletal: Normal range of motion.         General: Swelling present. No tenderness.   Skin:     General: Skin is warm and dry.      Coloration: Skin is not pale.   Neurological:      Mental Status: She is alert and oriented to  person, place, and time.      Cranial Nerves: No cranial nerve deficit.      Motor: Weakness present. No abnormal muscle tone.   Psychiatric:         Thought Content: Thought content normal.         Fluids    Intake/Output Summary (Last 24 hours) at 7/15/2020 1057  Last data filed at 7/15/2020 0800  Gross per 24 hour   Intake 1080 ml   Output 800 ml   Net 280 ml       Laboratory  Recent Labs     07/15/20  0358   WBC 8.1   RBC 2.85*   HEMOGLOBIN 8.7*   HEMATOCRIT 27.8*   MCV 97.5   MCH 30.5   MCHC 31.3*   RDW 50.7*   PLATELETCT 109*   MPV 8.5*     Recent Labs     07/13/20  0155 07/14/20  0150 07/15/20  0358   SODIUM 133* 134* 135   POTASSIUM 5.4 5.4 5.7*   CHLORIDE 95* 97 97   CO2 25 24 26   GLUCOSE 115* 109* 136*   BUN 48* 47* 50*   CREATININE 2.85* 2.74* 2.82*   CALCIUM 8.9 8.5 8.6                   Imaging  DX-CHEST-PORTABLE (1 VIEW)   Final Result         No significant interval change.      Unchanged diffuse interstitial prominence.      EC-ECHOCARDIOGRAM COMPLETE W/O CONT   Final Result      US-RUQ   Final Result      Cholelithiasis with mild gallbladder wall thickening but no positive sonographic Tron[place]o sign. This could indicate chronic cholecystitis or incomplete distention      DX-CHEST-PORTABLE (1 VIEW)   Final Result      1.  Satisfactory position of new right IJV multilumen dialysis catheter. No pneumothorax identified.      2.  Unchanged mild volume overload pattern.      DX-CHEST-PORTABLE (1 VIEW)   Final Result      No acute cardiac or pulmonary abnormality is noted. Stable cardiomegaly with increased interstitial opacity diffusely.           Assessment/Plan  Hyperkalemia- (present on admission)  Assessment & Plan  Cont on lasix   Also giving a dose of kayexalate  Monitor       Acute renal failure superimposed on stage 4 chronic kidney disease (HCC)- (present on admission)  Assessment & Plan  Had recent admission, given lasix at discharge and new BP meds, metoprolol and norvasc.  HR 37 and low BP on  admission.  Had HD emergently x 3 with improvement of SOB, 1 L off yesterday, 2 L off today.  Dr. Pompa following for HD ongoing determination, for now plans on TTS schedule.  Will need temporary HD catheter placed prior to dc and HD chair set up.  Patient states she gets better, then goes back to SOB for several months off and on.       7/7 HD per nephro, monitoring for renal recovery  7/8  S/p HD, ?outpatient HD, nephro to arrange  Need to arrange ride to HD 3x/weekly from group home vs snf  CM to assist  - encourage activity, OOB bid  On 5L O2    7/9 s/p HD (7/8) with hypotension, now resovled  - HD per nephrology  Improving BNP    7/10 bp stable, UOP 1250 ml  nephro following, ?diuretics vs ongoing HD  Appreciate input    7/11 on lasix per neprhology  Decreased u.o 400 ml/24 hours  - monitor  HD per neprho    7/12 U.O 1800ml/24 hours  - K5.2, HD per nephrology  On lasix    7/13 u.o. 585/24 hours  - K 5.4, increasing CR 2.85  Nephrology following  On lasix  - now off HD    - snf referral, CM assisting    Pneumonia due to infectious organism  Assessment & Plan  Had been treated with 5 days rocephin, 3 days zithromax since admission.  CXR improved edema, no definite infiltrate to suggest pneumonia  C/o productive sputum, ordered sputum culture, hold on abx for now.    Leukocytosis- (present on admission)  Assessment & Plan  2/2 steroids.    Acute exacerbation of chronic obstructive pulmonary disease (COPD) ([facility])- (present on admission)  Assessment & Plan  Patient admitted with COPD exacerbation  7/2 tapering solumedrol IV to po prednisone 40 mg daily x 4 days.  7/4:  Sputum culture ordered for productive cough.  7/10: 99 on 5L, taper to baseline needs as tolerated    Wean of O2 as tolerated     DM type 2, uncontrolled, with renal complications ([facility])- (present on admission)  Assessment & Plan  SSI  Diabetic, renal diet.    UTI (urinary tract infection) due to Enterococcus  Assessment & Plan  VRE on urine culture ss  zyvox  Increasing wbc daily.  7/6:  Treat zyvox x 7 days.    7/7 ongoing wbc  F/u am labs    7/8 improving wbc  Cont zyvox 7/13    Renovascular hypertension  Assessment & Plan  Ordered PRN BP meds for sbp >160 or DBP >90 hydralazine.  Monitor  Avoid BB since she went into cardiogenic shock 2/2 bradycardia.    Chronic hepatitis C without hepatic coma (HCC)- (present on admission)  Assessment & Plan  Will need outpatient treatment.  Elevated LFTs.    Junctional bradycardia- (present on admission)  Assessment & Plan  Combination of hyperkalemia, acute on CKD4 and metoprolol, norvasc  Now resolved s/p HD.    Acute on chronic respiratory failure with hypoxia (HCC)- (present on admission)  Assessment & Plan  Initially admitted to ICU for respiratory support.  No intubation is noted in the record.    Anemia- (present on admission)  Assessment & Plan  2/2 CKD stage 4.    7/8 hgb 6.6, plan for transfusion today  - f/u am cbc, monitor    7/9 s/p 2 u prbc, hgb 9  F/u am labs    7/10 monitor cbc  Significant hypotension noted with anemia and HD, s/p transfusion  hgb 9 to 8, monitor    7/11 hgb 8 to 7.9, monitor  F/u am labs    7/12 h/h stable, monitor  Transfuse prn    Pulmonary hypertension (HCC)- (present on admission)  Assessment & Plan  EF 80%  RVP elevated 55-60%.  Likely will need some diuretic for pulmonary edema.  Currently on HD with 2 L removed on 7/3.      Hyponatremia  Assessment & Plan  7/12 - ARF, on HD per nephrology  - monitor    Tobacco dependence- (present on admission)  Assessment & Plan  Recommend cessation    Chronic use of opiate drug for therapeutic purpose- (present on admission)  Assessment & Plan  On oxycodone 10 mg tid at home, continued in hospital    EDITH (obstructive sleep apnea)- (present on admission)  Assessment & Plan  ongoing    Hypothyroidism- (present on admission)  Assessment & Plan  Continue home meds.    Essential hypertension- (present on admission)  Assessment & Plan  7/9 uncontrolled,  with intermittent hypotension with HD  Monitor closely, add norvasc with holding parameters       VTE prophylaxis: heparin

## 2020-07-15 NOTE — PROGRESS NOTES
"Patient's central line dressing is not properly secured and coming undone, patient refused this RN's attempt to replace dressing, saying \"they are going to take it out anyways\". Education provided on risk for infection due to insecure dressing, current dressing reinforced with extra tape.  "

## 2020-07-15 NOTE — PROGRESS NOTES
Received bedside report from RN, pt care assumed, VSS, pt assessment complete. Pt AAOx4 with complaint of generalized pain in back and feet, pain medication not due at this time. No signs of acute distress noted at this time. On 6L NC, SpO2 95%. POC discussed with pt and verbalizes no questions. Pt denies any additional needs at this time. Bed in lowest position, pt educated on fall risk and verbalized understanding, call light within reach, hourly rounding initiated.

## 2020-07-15 NOTE — PROGRESS NOTES
Notified hospitalist APRN of patient's potassium increasing to 5.7 from 5.4 the day prior. No new orders received, day shift team to be updated.   past psychiatric history of PTSD (in remission), depression (compliant with Lexapro), anxiety, xanax use disorder, remote hx of opiate use disorder w/ one detox admission, with one remote IPP hospitalization for severe depression, no past suicide attempts, with 1 episode of non suicidal self injurious behavior 1 week ago via cutting b/l wrists

## 2020-07-15 NOTE — CARE PLAN
Problem: Safety  Goal: Will remain free from injury  Outcome: PROGRESSING AS EXPECTED  Note: Verified that safety precautions are in place and education provided to pt on fall safety and utilization of call button       Problem: Discharge Barriers/Planning  Goal: Patient's continuum of care needs will be met  Outcome: PROGRESSING AS EXPECTED  Note: RN will assess possible discharge barriers on admission and throughout hospital stay

## 2020-07-15 NOTE — PROGRESS NOTES
Nephrology Daily Progress Note    Date of Service  7/15/2020    Chief Complaint  69 y.o. female with a history of CKD 3, COPD who presented 6/30/2020 with shortness of breath, found to have bradycardia, hypotension, and HAN.    Interval Problem Update  7/1 -patient had dialysis yesterday with no fluid removed, and resolution of shock on completion of dialysis.  Patient had 500 mL of urine output documented last 24 hours.  Patient had dialysis again this morning with 1 L removed.  Patient says her breathing is better.  Denies chest pain, shortness of breath at this time.  7/3 -patient had third session of dialysis yesterday with 2 L removed.  Tolerated well.  Denies chest pain this morning.  Complains of continued shortness of breath, but improved from admission.  7/5 -patient had dialysis yesterday with 1.5 L removed.  Patient says she is urinating, but no urine output being recorded.  Patient says her shortness of breath is still there, but better compared to admission.  Patient denies chest pain.  7/6 -doing better, less SOB  Scheduled for HD TTS  7/9 -doing well, improved SOB  UOP 1500 cc/24 H  Holding dialysis -watching for recovery  7/10 -doing well  Edema improving  SOB better  Good UOP  Holding dialysis  7/11 -doing well, no complaints  SOB and edema improved  Good UOP  Off HD  7/12 -no acute events, no complaints  Good UOP  Creat improving  Off HD  7/13 patient is doing better , still dyspnea on exertion , no chest pain, creatinine is worse.  7/14 patient is complaining of back pain, no chest pain  7/15 patient refused diuretic therapy at this morning  review of Systems  Review of Systems   Constitutional: Positive for malaise/fatigue. Negative for chills and fever.   Respiratory: Positive for shortness of breath. Negative for cough.    Cardiovascular: Negative for chest pain and leg swelling.   Gastrointestinal: Negative for nausea and vomiting.   Genitourinary: Negative for dysuria, frequency and urgency.    Psychiatric/Behavioral: The patient is nervous/anxious.    All other systems reviewed and are negative.       Physical Exam  Temp:  [36.4 °C (97.6 °F)-36.8 °C (98.2 °F)] 36.8 °C (98.2 °F)  Pulse:  [65-96] 68  Resp:  [16-20] 16  BP: (114-145)/(34-98) 137/47  SpO2:  [92 %-96 %] 96 %    Physical Exam  Vitals signs and nursing note reviewed.   Constitutional:       General: She is awake. She is not in acute distress.     Appearance: She is well-developed. She is ill-appearing. She is not diaphoretic.      Interventions: Nasal cannula in place.   HENT:      Head: Normocephalic and atraumatic.      Right Ear: External ear normal.      Left Ear: External ear normal.      Nose: Nose normal. No rhinorrhea.      Mouth/Throat:      Pharynx: No oropharyngeal exudate or posterior oropharyngeal erythema.   Eyes:      General: No scleral icterus.        Right eye: No discharge.         Left eye: No discharge.      Conjunctiva/sclera: Conjunctivae normal.   Neck:      Musculoskeletal: No neck rigidity or muscular tenderness.      Vascular: No carotid bruit.   Cardiovascular:      Rate and Rhythm: Normal rate and regular rhythm.      Heart sounds: No murmur.   Pulmonary:      Effort: Pulmonary effort is normal. No respiratory distress.      Breath sounds: Normal breath sounds.   Abdominal:      General: Abdomen is flat. There is no distension.      Palpations: Abdomen is soft. There is no mass.   Musculoskeletal:         General: No tenderness.      Right lower leg: Edema present.      Left lower leg: Edema present.   Skin:     General: Skin is warm and dry.      Coloration: Skin is not jaundiced.   Neurological:      General: No focal deficit present.      Mental Status: She is alert and oriented to person, place, and time. Mental status is at baseline.   Psychiatric:         Mood and Affect: Mood normal.         Behavior: Behavior normal.         Thought Content: Thought content normal.     Access: Right IJ temporary dialysis  catheter.      Fluids    Intake/Output Summary (Last 24 hours) at 7/15/2020 1153  Last data filed at 7/15/2020 0800  Gross per 24 hour   Intake 1080 ml   Output 800 ml   Net 280 ml       Laboratory  Labs reviewed, pertinent labs below.  Recent Labs     07/15/20  0358   WBC 8.1   RBC 2.85*   HEMOGLOBIN 8.7*   HEMATOCRIT 27.8*   MCV 97.5   MCH 30.5   MCHC 31.3*   RDW 50.7*   PLATELETCT 109*   MPV 8.5*     Recent Labs     07/13/20  0155 07/14/20  0150 07/15/20  0358   SODIUM 133* 134* 135   POTASSIUM 5.4 5.4 5.7*   CHLORIDE 95* 97 97   CO2 25 24 26   GLUCOSE 115* 109* 136*   BUN 48* 47* 50*   CREATININE 2.85* 2.74* 2.82*   CALCIUM 8.9 8.5 8.6               URINALYSIS:  Lab Results   Component Value Date/Time    COLORURINE DK Yellow 06/15/2019 0245    CLARITY Turbid (A) 06/15/2019 0245    SPECGRAVITY 1.020 06/15/2019 0245    PHURINE 5.0 06/15/2019 0245    KETONES Trace (A) 06/15/2019 0245    PROTEINURIN 300 (A) 06/15/2019 0245    BILIRUBINUR Negative 06/15/2019 0245    UROBILU 1.0 06/15/2019 0245    NITRITE Negative 06/15/2019 0245    LEUKESTERAS Negative 06/15/2019 0245    OCCULTBLOOD Negative 06/15/2019 0245     UPC  No results found for: TOTPROTUR No results found for: CREATININEU      Imaging reviewed  DX-CHEST-PORTABLE (1 VIEW)   Final Result         No significant interval change.      Unchanged diffuse interstitial prominence.      EC-ECHOCARDIOGRAM COMPLETE W/O CONT   Final Result      US-RUQ   Final Result      Cholelithiasis with mild gallbladder wall thickening but no positive sonographic Troncoso sign. This could indicate chronic cholecystitis or incomplete distention      DX-CHEST-PORTABLE (1 VIEW)   Final Result      1.  Satisfactory position of new right IJV multilumen dialysis catheter. No pneumothorax identified.      2.  Unchanged mild volume overload pattern.      DX-CHEST-PORTABLE (1 VIEW)   Final Result      No acute cardiac or pulmonary abnormality is noted. Stable cardiomegaly with increased  interstitial opacity diffusely.            Current Facility-Administered Medications   Medication Dose Route Frequency Provider Last Rate Last Dose   • furosemide (LASIX) tablet 80 mg  80 mg Oral BID DIURETIC Fadi Najjar, M.D.   80 mg at 07/15/20 0600   • fluticasone (FLOVENT HFA) 44 MCG/ACT inhaler 88 mcg  2 Puff Inhalation QDAILY (RT) Roger Florence Jr., D.O.   88 mcg at 07/15/20 0746   • amLODIPine (NORVASC) tablet 5 mg  5 mg Oral Q DAY ROBY Dimas.OMelonie   5 mg at 07/15/20 0556   • heparin injection 1,500 Units  1,500 Units Intravenous DIALYSIS PRN Rosi Bansal M.D.   1,500 Units at 07/08/20 1430   • ipratropium-albuterol (DUONEB) nebulizer solution  3 mL Nebulization Q2HRS PRN (RT) Christina Calderon M.D.       • hydrALAZINE (APRESOLINE) tablet 25 mg  25 mg Oral Q8HRS PRN Christina Calderon M.D.   25 mg at 07/10/20 1647   • heparin injection 2,000 Units  2,000 Units Intravenous DIALYSIS PRN Eric Pompa M.D.   2,000 Units at 07/07/20 1439   • oxyCODONE immediate release (ROXICODONE) tablet 10 mg  10 mg Oral TID PRN Christina Calderon M.D.   10 mg at 07/15/20 0556   • lactulose 20 GM/30ML solution 30 mL  30 mL Oral BID W/MEALS PRN Heber Armstrong M.D.       • NS (BOLUS) infusion 250 mL  250 mL Intravenous DIALYSIS PRN Eric Pompa M.D.       • albumin human 25% solution 12.5 g  12.5 g Intravenous DIALYSIS PRN Eric Pompa M.D. 150 mL/hr at 06/30/20 1513 12.5 g at 06/30/20 1513   • Respiratory Therapy Consult   Nebulization Continuous RT Roger Florence Jr., D.O.       • DULoxetine (CYMBALTA) capsule 60 mg  60 mg Oral DAILY Roger Florence Jr., D.O.   60 mg at 07/15/20 0556   • hydrOXYzine HCl (ATARAX) tablet 25 mg  25 mg Oral TID PRN Roger Florence Jr., D.O.   25 mg at 07/10/20 1642   • levothyroxine (SYNTHROID) tablet 75 mcg  75 mcg Oral AM ES Roger Florence Jr. D.O.   75 mcg at 07/15/20 0556   • lidocaine (LIDODERM) 5 % 1 Patch  1 Patch Transdermal Q24HRS PRN Roger Florence Jr., D.O.   1 Patch at 07/04/20 2211   •  traZODone (DESYREL) tablet 300 mg  300 mg Oral BID ROBY Sarmiento Jr..O.   300 mg at 07/15/20 0556   • rosuvastatin (CRESTOR) tablet 10 mg  10 mg Oral Q EVENING ROBY Sarmiento Jr..O.   10 mg at 07/14/20 1757   • senna-docusate (PERICOLACE or SENOKOT S) 8.6-50 MG per tablet 2 Tab  2 Tab Oral BID ROBY Sarmiento Jr..O.   Stopped at 07/12/20 0600    And   • polyethylene glycol/lytes (MIRALAX) PACKET 1 Packet  1 Packet Oral QDAY PRN ROBY Sarmiento Jr..O.   1 Packet at 07/02/20 0500    And   • magnesium hydroxide (MILK OF MAGNESIA) suspension 30 mL  30 mL Oral QDAY PRN ROBY Sarmiento Jr..OMelonie        And   • bisacodyl (DULCOLAX) suppository 10 mg  10 mg Rectal QDAY PRN ROBY Sarmiento Jr..O.       • [Held by provider] heparin injection 5,000 Units  5,000 Units Subcutaneous Q8HRS ROBY Sarmiento Jr..O.   Stopped at 07/10/20 2200   • ondansetron (ZOFRAN) syringe/vial injection 4 mg  4 mg Intravenous Q4HRS PRN ROBY Sarmiento Jr..O.       • ondansetron (ZOFRAN ODT) dispertab 4 mg  4 mg Oral Q4HRS PRN ROBY Sarmiento Jr..O.       • Pharmacy Consult Request ...Pain Management Review 1 Each  1 Each Other PHARMACY TO DOSE ROBY Sarmiento Jr..LANCE.       • lactated ringers infusion (BOLUS)  500 mL Intravenous Once PRN ROBY Sarmiento Jr..O.       • umeclidinium-vilanterol (ANORO ELLIPTA) inhaler 1 Puff  1 Puff Inhalation QDAILY (RT) ROBY Sarmiento Jr..O.   1 Puff at 07/15/20 0745   • ipratropium-albuterol (DUONEB) nebulizer solution  3 mL Nebulization Q2HRS PRN (RT) Heber Armstrong M.D.   3 mL at 07/14/20 0608   • heparin injection 2,400 Units  2,400 Units Intracatheter ACUTE DIALYSIS PRN Eric Pompa M.D.   2,400 Units at 07/08/20 1648   • insulin regular (HUMULIN R) injection 1-6 Units  1-6 Units Subcutaneous 4X/DAY RUSS Glover M.D.   Stopped at 07/14/20 1700    And   • glucose 4 g chewable tablet 16 g  16 g Oral Q15 MIN PRN Bob Glover M.D.        And   • dextrose 50%  (D50W) injection 50 mL  50 mL Intravenous Q15 MIN PRN Bob Glover M.D.             Assessment/Plan  69 y.o. female with a history of CKD 3, COPD who presented 6/30/2020 with shortness of breath, found to have bradycardia, hypotension, and HAN.     1.  HAN on CKD stage III : Creatinine still elevated  2.  HTN: Blood pressure on the low side  3.  Volume overload  4.  Hyponatremia.   5.  Anemia: d stable   6.  Cardiogenic shock.   7   hyperkalemia  Recs:   no acute need for HD today, evaluate daily, plan on dialysis in the next 24 to 48 hours if no significant improvement  Continue Lasix  Low potassium diet  Consider cardiology evaluation  Renal diet  Daily labs  Renal dose all meds  Avoid nephrotoxins  Prognosis guarded.  Discussed with Dr. Dewey

## 2020-07-15 NOTE — PROGRESS NOTES
Monitor summary:  SR 70-96  (r) PAC (r) PVC (r) triplet  3 seconds of PSVT, HR 130s  0.18/0.08/0.36

## 2020-07-16 LAB
ABO GROUP BLD: ABNORMAL
ALBUMIN SERPL BCP-MCNC: 2.8 G/DL (ref 3.2–4.9)
ALBUMIN/GLOB SERPL: 1.2 G/DL
ALP SERPL-CCNC: 70 U/L (ref 30–99)
ALT SERPL-CCNC: 138 U/L (ref 2–50)
ANION GAP SERPL CALC-SCNC: 10 MMOL/L (ref 7–16)
AST SERPL-CCNC: 81 U/L (ref 12–45)
BARCODED ABORH UBTYP: 5100
BARCODED PRD CODE UBPRD: NORMAL
BARCODED UNIT NUM UBUNT: NORMAL
BILIRUB SERPL-MCNC: 0.5 MG/DL (ref 0.1–1.5)
BLD GP AB SCN SERPL QL: ABNORMAL
BUN SERPL-MCNC: 51 MG/DL (ref 8–22)
CALCIUM SERPL-MCNC: 8.5 MG/DL (ref 8.5–10.5)
CHLORIDE SERPL-SCNC: 97 MMOL/L (ref 96–112)
CO2 SERPL-SCNC: 29 MMOL/L (ref 20–33)
COMPONENT R 8504R: NORMAL
CREAT SERPL-MCNC: 2.81 MG/DL (ref 0.5–1.4)
ERYTHROCYTE [DISTWIDTH] IN BLOOD BY AUTOMATED COUNT: 50.8 FL (ref 35.9–50)
GLOBULIN SER CALC-MCNC: 2.3 G/DL (ref 1.9–3.5)
GLUCOSE BLD-MCNC: 113 MG/DL (ref 65–99)
GLUCOSE BLD-MCNC: 148 MG/DL (ref 65–99)
GLUCOSE BLD-MCNC: 159 MG/DL (ref 65–99)
GLUCOSE BLD-MCNC: 280 MG/DL (ref 65–99)
GLUCOSE SERPL-MCNC: 103 MG/DL (ref 65–99)
HCT VFR BLD AUTO: 20 % (ref 37–47)
HCT VFR BLD AUTO: 24.4 % (ref 37–47)
HGB BLD-MCNC: 6.3 G/DL (ref 12–16)
HGB BLD-MCNC: 7.8 G/DL (ref 12–16)
MCH RBC QN AUTO: 31.2 PG (ref 27–33)
MCHC RBC AUTO-ENTMCNC: 31.5 G/DL (ref 33.6–35)
MCV RBC AUTO: 99 FL (ref 81.4–97.8)
PLATELET # BLD AUTO: 104 K/UL (ref 164–446)
PMV BLD AUTO: 8.8 FL (ref 9–12.9)
POTASSIUM SERPL-SCNC: 4.5 MMOL/L (ref 3.6–5.5)
PRODUCT TYPE UPROD: NORMAL
PROT SERPL-MCNC: 5.1 G/DL (ref 6–8.2)
RBC # BLD AUTO: 2.02 M/UL (ref 4.2–5.4)
RH BLD: ABNORMAL
SODIUM SERPL-SCNC: 136 MMOL/L (ref 135–145)
UNIT STATUS USTAT: NORMAL
WBC # BLD AUTO: 6.7 K/UL (ref 4.8–10.8)

## 2020-07-16 PROCEDURE — A9270 NON-COVERED ITEM OR SERVICE: HCPCS | Performed by: HOSPITALIST

## 2020-07-16 PROCEDURE — 700102 HCHG RX REV CODE 250 W/ 637 OVERRIDE(OP): Performed by: INTERNAL MEDICINE

## 2020-07-16 PROCEDURE — 80053 COMPREHEN METABOLIC PANEL: CPT

## 2020-07-16 PROCEDURE — A9270 NON-COVERED ITEM OR SERVICE: HCPCS | Performed by: INTERNAL MEDICINE

## 2020-07-16 PROCEDURE — 86923 COMPATIBILITY TEST ELECTRIC: CPT

## 2020-07-16 PROCEDURE — 700102 HCHG RX REV CODE 250 W/ 637 OVERRIDE(OP): Performed by: NURSE PRACTITIONER

## 2020-07-16 PROCEDURE — 86901 BLOOD TYPING SEROLOGIC RH(D): CPT

## 2020-07-16 PROCEDURE — 700102 HCHG RX REV CODE 250 W/ 637 OVERRIDE(OP): Performed by: HOSPITALIST

## 2020-07-16 PROCEDURE — 85027 COMPLETE CBC AUTOMATED: CPT

## 2020-07-16 PROCEDURE — 94640 AIRWAY INHALATION TREATMENT: CPT

## 2020-07-16 PROCEDURE — 770020 HCHG ROOM/CARE - TELE (206)

## 2020-07-16 PROCEDURE — A9270 NON-COVERED ITEM OR SERVICE: HCPCS | Performed by: NURSE PRACTITIONER

## 2020-07-16 PROCEDURE — 86900 BLOOD TYPING SEROLOGIC ABO: CPT

## 2020-07-16 PROCEDURE — 86850 RBC ANTIBODY SCREEN: CPT

## 2020-07-16 PROCEDURE — 82962 GLUCOSE BLOOD TEST: CPT | Mod: 91

## 2020-07-16 PROCEDURE — 85018 HEMOGLOBIN: CPT

## 2020-07-16 PROCEDURE — 85014 HEMATOCRIT: CPT

## 2020-07-16 PROCEDURE — P9016 RBC LEUKOCYTES REDUCED: HCPCS

## 2020-07-16 PROCEDURE — 700111 HCHG RX REV CODE 636 W/ 250 OVERRIDE (IP): Performed by: INTERNAL MEDICINE

## 2020-07-16 PROCEDURE — 36430 TRANSFUSION BLD/BLD COMPNT: CPT

## 2020-07-16 PROCEDURE — 99232 SBSQ HOSP IP/OBS MODERATE 35: CPT | Performed by: INTERNAL MEDICINE

## 2020-07-16 PROCEDURE — 94760 N-INVAS EAR/PLS OXIMETRY 1: CPT

## 2020-07-16 RX ORDER — FUROSEMIDE 10 MG/ML
10 INJECTION INTRAMUSCULAR; INTRAVENOUS ONCE
Status: COMPLETED | OUTPATIENT
Start: 2020-07-16 | End: 2020-07-16

## 2020-07-16 RX ORDER — SODIUM CHLORIDE 9 MG/ML
INJECTION, SOLUTION INTRAVENOUS
Status: ACTIVE
Start: 2020-07-16 | End: 2020-07-16

## 2020-07-16 RX ADMIN — TRAZODONE HYDROCHLORIDE 300 MG: 150 TABLET ORAL at 06:01

## 2020-07-16 RX ADMIN — FUROSEMIDE 10 MG: 10 INJECTION, SOLUTION INTRAMUSCULAR; INTRAVENOUS at 12:35

## 2020-07-16 RX ADMIN — ROSUVASTATIN CALCIUM 10 MG: 20 TABLET, FILM COATED ORAL at 17:51

## 2020-07-16 RX ADMIN — DULOXETINE HYDROCHLORIDE 60 MG: 60 CAPSULE, DELAYED RELEASE ORAL at 06:04

## 2020-07-16 RX ADMIN — OXYCODONE HYDROCHLORIDE 10 MG: 10 TABLET ORAL at 22:08

## 2020-07-16 RX ADMIN — OXYCODONE HYDROCHLORIDE 10 MG: 10 TABLET ORAL at 14:09

## 2020-07-16 RX ADMIN — NYSTATIN: 100000 CREAM TOPICAL at 00:31

## 2020-07-16 RX ADMIN — LEVOTHYROXINE SODIUM 75 MCG: 75 TABLET ORAL at 06:01

## 2020-07-16 RX ADMIN — TRAZODONE HYDROCHLORIDE 300 MG: 150 TABLET ORAL at 21:41

## 2020-07-16 RX ADMIN — AMLODIPINE BESYLATE 5 MG: 5 TABLET ORAL at 06:03

## 2020-07-16 RX ADMIN — INSULIN HUMAN 1 UNITS: 100 INJECTION, SOLUTION PARENTERAL at 21:45

## 2020-07-16 RX ADMIN — FUROSEMIDE 80 MG: 40 TABLET ORAL at 06:03

## 2020-07-16 RX ADMIN — FUROSEMIDE 80 MG: 40 TABLET ORAL at 17:51

## 2020-07-16 RX ADMIN — INSULIN HUMAN 3 UNITS: 100 INJECTION, SOLUTION PARENTERAL at 11:24

## 2020-07-16 RX ADMIN — NYSTATIN: 100000 CREAM TOPICAL at 06:03

## 2020-07-16 RX ADMIN — DOCUSATE SODIUM 50 MG AND SENNOSIDES 8.6 MG 2 TABLET: 8.6; 5 TABLET, FILM COATED ORAL at 06:01

## 2020-07-16 RX ADMIN — OXYCODONE HYDROCHLORIDE 10 MG: 10 TABLET ORAL at 00:26

## 2020-07-16 RX ADMIN — FLUTICASONE PROPIONATE 88 MCG: 44 AEROSOL, METERED RESPIRATORY (INHALATION) at 06:19

## 2020-07-16 RX ADMIN — NYSTATIN: 100000 CREAM TOPICAL at 17:53

## 2020-07-16 ASSESSMENT — ENCOUNTER SYMPTOMS
SORE THROAT: 0
ABDOMINAL PAIN: 0
FOCAL WEAKNESS: 0
NECK PAIN: 0
CHILLS: 0
COUGH: 0
FEVER: 0
HEADACHES: 0
VOMITING: 0
SHORTNESS OF BREATH: 0
CLAUDICATION: 0
WEAKNESS: 1
NAUSEA: 0
DIZZINESS: 0
NERVOUS/ANXIOUS: 0
MYALGIAS: 0
LOSS OF CONSCIOUSNESS: 0

## 2020-07-16 ASSESSMENT — FIBROSIS 4 INDEX: FIB4 SCORE: 4.64

## 2020-07-16 NOTE — PROGRESS NOTES
Hospital Medicine Daily Progress Note    Date of Service  7/16/2020    Chief Complaint  69 y.o. female admitted 6/30/2020 with junctional bradycardia rate 37 with cardiogenic shock, SOB.    Hospital Course    7/2:  This 70 yo female admitted by critical care to ICU for HR 37 junctional rhythm, COPD on 4 LPM NC at home, DM, hypothyroidism, hepatitis C, HTN, CHF, CKD stage 4 followed by Dr. Nava presented with worsening SOB, hypoxia and cough.  She was recently admitted 6/15/20 for COPD exacerbation and pulmonary edema, given amlodopine, metoprolol and lasix.   COVID 19 negative x3.  K was elevated 6.3.  Nephrology consulted and started HD with improvement of HR, BP and SOB.  3 HD sessions performed including today with 2 L removed.  She is feeling better, but still swollen in extremities, on 4 LPM NC baseline O2, ready for dc out of ICU.  Her BP is currently controlled at 97/69, no BP meds given at this point.  K normalized and Cr imporved.  Wbc increased to 23K on IV steroids.  I have started oral taper from solumedrol 60 IV q 12 to 40mg po daily.  It is unclear why patient was started on Rocephin on admission.  I have ordered a urine culture, no UA on admission or culture.  No evidence for pneumonia.  7/3:  Patient still with orthopnea, but no wheeze, wet cough noted today.  No HD today, waiting until tomorrow.  Has temporary triple lumen RIJ.  Wbc decreasing, Cr improving 3.24 to 2.58.  EF 80% but RVP elevated 55-60 with moderate mitral regurg noted. SR 76-92.  7/4:  Remains NSR on monitor.  No SOB, had 1.5 L removed during HD today.  Cr improving, urine output improving daily.  States having productive sputum thick brown, ordered sputum culture.  Lungs CTA b/l.  7/5:  No change, continues on baseline O2 4 LPM NC, no wheeze or crackles.  7/6:  C/o productive sputum, had 5 days of Rocephin, zithromax completed.  Ordered sputum culture, sent today with thick brown.  Wbc increased last 2 days, repeat CXR no  infiltrate, improved pulmonary edema since 6/30 CXR.  + VRE on urine culture, started zyvox bid x 7d ays.*    7/7: Patient reports making 250 mL urine output  Denies any dysuria  Plan for hemodialysis today  On Zyvox  She reports generalized weakness, will likely need placement, from assisted living facility  7/8 we will monitor ins and outs closely  Discussed with nephrology, Dr. Mccabe  Denies dysuria  Generalized weakness, requiring 1 person assist  Encourage activity  Discussed with RN  Baseline oxygen needs of 4 L, will taper as tolerated  Anemia requiring transfusion, will monitor closely  Reports lethargy today  7/9 no new complaints  Patient reports urine output  Per nephrology, will start trial of Lasix  Hemodialysis per nephrology  7/10 improving shortness of breath  Improving urinary output of greater than 1200 mL's  Dialysis per nephrology  Encourage activity  7/11 SBA per staff  Poor urine output, 400 ml/24 hours  7/12  Improved urine output  1800ml/24 hours  -deconditioned, snf referral  Encourage IS use  Improved urine output, for HD catheter removal per nephro  7/13 poor urine output overnight, 585/20 4 hours  On Lasix  Potassium of 5.4    7/14: Pt seen and examined no acute events overnight, feels SOB, denies any chest pain. No nausea or vomiting.  Nephrology following, no need for dialysis anymore  Appreciate rec.     7/15Pt resting in bed still with SOB, refused lasix this AM, discussed with pt regarding it and now agrees to take it.  Afebrile, potassium 5.7 this AM, will give a dose of kayexalate  Nephrology also following appreciate rec    7/16: Pt seen and examined, hemoglobin 6.2 this AM, received 1 unit of RBC now hemoglobin 7.8.  Still SOB c cont on aggressive diuresis   Nephrology following appreciate rec.   Hyperkalemia improving     Consultants/Specialty  Nephrology   Critical care    Code Status  DNAR, I ok    Disposition   getting HD per nephrology   Hep C +, no treatment yet, patient  understands the benefits of treatment and will need to pursue outpatient tx.  Resides at [facility].  OT/PT no needs.  Baseline O2 5 LPM NC.    snf referral, CM to assist  Zyvox through July 13        Review of Systems  Review of Systems   Constitutional: Negative for chills and fever.   HENT: Negative for sore throat.    Respiratory: Negative for cough and shortness of breath.    Cardiovascular: Positive for leg swelling. Negative for chest pain and claudication.   Gastrointestinal: Negative for abdominal pain.   Genitourinary: Negative for dysuria.   Musculoskeletal: Negative for myalgias and neck pain.   Neurological: Positive for weakness. Negative for dizziness, focal weakness, loss of consciousness and headaches.   Psychiatric/Behavioral: The patient is not nervous/anxious.         Physical Exam  Temp:  [36.3 °C (97.4 °F)-37.1 °C (98.7 °F)] 36.7 °C (98.1 °F)  Pulse:  [59-93] 93  Resp:  [16-22] 18  BP: (113-150)/(31-66) 140/50  SpO2:  [92 %-98 %] 97 %    Physical Exam  Vitals signs and nursing note reviewed.   Constitutional:       General: She is not in acute distress.     Appearance: She is well-developed. She is not ill-appearing or toxic-appearing.   HENT:      Head: Normocephalic and atraumatic.      Nose: Nose normal.      Mouth/Throat:      Pharynx: No oropharyngeal exudate.   Eyes:      Extraocular Movements: Extraocular movements intact.      Pupils: Pupils are equal, round, and reactive to light.   Neck:      Musculoskeletal: Normal range of motion and neck supple.      Thyroid: No thyromegaly.      Vascular: No JVD.      Trachea: No tracheal deviation.   Cardiovascular:      Rate and Rhythm: Normal rate.      Pulses: Normal pulses.   Pulmonary:      Effort: Pulmonary effort is normal. No respiratory distress.      Breath sounds: Normal breath sounds. No wheezing.   Abdominal:      General: Bowel sounds are normal. There is no distension.      Palpations: Abdomen is soft.   Musculoskeletal: Normal range of  motion.         General: Swelling present. No tenderness.   Skin:     General: Skin is warm and dry.      Coloration: Skin is not pale.   Neurological:      Mental Status: She is alert and oriented to person, place, and time.      Cranial Nerves: No cranial nerve deficit.      Motor: Weakness present. No abnormal muscle tone.   Psychiatric:         Thought Content: Thought content normal.         Fluids    Intake/Output Summary (Last 24 hours) at 7/16/2020 1435  Last data filed at 7/16/2020 1115  Gross per 24 hour   Intake 600 ml   Output 3150 ml   Net -2550 ml       Laboratory  Recent Labs     07/15/20  0358 07/16/20  0415 07/16/20  1308   WBC 8.1 6.7  --    RBC 2.85* 2.02*  --    HEMOGLOBIN 8.7* 6.3* 7.8*   HEMATOCRIT 27.8* 20.0* 24.4*   MCV 97.5 99.0*  --    MCH 30.5 31.2  --    MCHC 31.3* 31.5*  --    RDW 50.7* 50.8*  --    PLATELETCT 109* 104*  --    MPV 8.5* 8.8*  --      Recent Labs     07/14/20  0150 07/15/20  0358 07/16/20  0415   SODIUM 134* 135 136   POTASSIUM 5.4 5.7* 4.5   CHLORIDE 97 97 97   CO2 24 26 29   GLUCOSE 109* 136* 103*   BUN 47* 50* 51*   CREATININE 2.74* 2.82* 2.81*   CALCIUM 8.5 8.6 8.5                   Imaging  DX-CHEST-PORTABLE (1 VIEW)   Final Result         No significant interval change.      Unchanged diffuse interstitial prominence.      EC-ECHOCARDIOGRAM COMPLETE W/O CONT   Final Result      US-RUQ   Final Result      Cholelithiasis with mild gallbladder wall thickening but no positive sonographic Troncoso sign. This could indicate chronic cholecystitis or incomplete distention      DX-CHEST-PORTABLE (1 VIEW)   Final Result      1.  Satisfactory position of new right IJV multilumen dialysis catheter. No pneumothorax identified.      2.  Unchanged mild volume overload pattern.      DX-CHEST-PORTABLE (1 VIEW)   Final Result      No acute cardiac or pulmonary abnormality is noted. Stable cardiomegaly with increased interstitial opacity diffusely.           Assessment/Plan  Hyperkalemia-  (present on admission)  Assessment & Plan  Cont on lasix   Improved   Monitor       Acute renal failure superimposed on stage 4 chronic kidney disease (HCC)- (present on admission)  Assessment & Plan  Had recent admission, given lasix at discharge and new BP meds, metoprolol and norvasc.  HR 37 and low BP on admission.  Had HD emergently x 3 with improvement of SOB, 1 L off yesterday, 2 L off today.  Dr. Pompa following for HD ongoing determination, for now plans on TTS schedule.  Will need temporary HD catheter placed prior to dc and HD chair set up.  Patient states she gets better, then goes back to SOB for several months off and on.       7/7 HD per nephro, monitoring for renal recovery  7/8  S/p HD, ?outpatient HD, nephro to arrange  Need to arrange ride to HD 3x/weekly from half-way vs snf  CM to assist  - encourage activity, OOB bid  On 5L O2    7/9 s/p HD (7/8) with hypotension, now resovled  - HD per nephrology  Improving BNP    7/10 bp stable, UOP 1250 ml  nephro following, ?diuretics vs ongoing HD  Appreciate input    7/11 on lasix per neprhology  Decreased u.o 400 ml/24 hours  - monitor  HD per neprho    7/12 U.O 1800ml/24 hours  - K5.2, HD per nephrology  On lasix    7/13 u.o. 585/24 hours  - K 5.4, increasing CR 2.85  Nephrology following  On lasix  - now off HD    _ cont on forced diuresis, nephrology following , off dialysis as no need at this time  Nephrology following appreciate rec.     Pneumonia due to infectious organism  Assessment & Plan  Had been treated with 5 days rocephin, 3 days zithromax since admission.  CXR improved edema, no definite infiltrate to suggest pneumonia  Cultures neg  No need for abx     Leukocytosis- (present on admission)  Assessment & Plan  2/2 steroids.    Acute exacerbation of chronic obstructive pulmonary disease (COPD) ([facility])- (present on admission)  Assessment & Plan  Patient admitted with COPD exacerbation  7/2 tapering solumedrol IV to po prednisone 40 mg daily x 4  days.  7/4:  Sputum culture ordered for productive cough.  7/10: 99 on 5L, taper to baseline needs as tolerated    Wean of O2 as tolerated     DM type 2, uncontrolled, with renal complications (HCC)- (present on admission)  Assessment & Plan  SSI  Diabetic, renal diet.    UTI (urinary tract infection) due to Enterococcus  Assessment & Plan  VRE on urine culture ss zyvox  Increasing wbc daily.  7/6:  Treat zyvox x 7 days.    7/7 ongoing wbc  F/u am labs    7/8 improving wbc  Cont zyvox 7/13    Finished zyvox     Renovascular hypertension  Assessment & Plan  Ordered PRN BP meds for sbp >160 or DBP >90 hydralazine.  Monitor  Avoid BB since she went into cardiogenic shock 2/2 bradycardia.    Chronic hepatitis C without hepatic coma (HCC)- (present on admission)  Assessment & Plan  Will need outpatient treatment.  Elevated LFTs.    Junctional bradycardia- (present on admission)  Assessment & Plan  Combination of hyperkalemia, acute on CKD4 and metoprolol, norvasc  Now resolved s/p HD.    Acute on chronic respiratory failure with hypoxia (HCC)- (present on admission)  Assessment & Plan  Initially admitted to ICU for respiratory support.  No intubation is noted in the record.    Anemia- (present on admission)  Assessment & Plan  2/2 CKD stage 4.    7/8 hgb 6.6, plan for transfusion today  - f/u am cbc, monitor    7/9 s/p 2 u prbc, hgb 9  F/u am labs    7/10 monitor cbc  Significant hypotension noted with anemia and HD, s/p transfusion  hgb 9 to 8, monitor    7/11 hgb 8 to 7.9, monitor  F/u am labs    Hemoglobin 6.2 today received 1 unit of RBC   Monitor transfuse if below 7     Pulmonary hypertension (HCC)- (present on admission)  Assessment & Plan  EF 80%  RVP elevated 55-60%.  Likely will need some diuretic for pulmonary edema.  Currently on HD with 2 L removed on 7/3.      Hyponatremia  Assessment & Plan  7/12 - ARF, on HD per nephrology  - monitor    Tobacco dependence- (present on admission)  Assessment &  Plan  Recommend cessation    Chronic use of opiate drug for therapeutic purpose- (present on admission)  Assessment & Plan  On oxycodone 10 mg tid at home, continued in hospital    EDITH (obstructive sleep apnea)- (present on admission)  Assessment & Plan  ongoing    Hypothyroidism- (present on admission)  Assessment & Plan  Continue home meds.    Essential hypertension- (present on admission)  Assessment & Plan  7/9 uncontrolled, with intermittent hypotension with HD   norvasc with holding parameters    Stable monitor and adjust medication as needed         VTE prophylaxis: scd

## 2020-07-16 NOTE — PROGRESS NOTES
Bedside report received. Pt. Lying in bed, calm, sleeping with unlabored breathing. All other needs are assessed, bed in lowest position, call light within reach. Fall precautions in place, pt. educated call for assistance.

## 2020-07-16 NOTE — CARE PLAN
Problem: Safety  Goal: Will remain free from injury  Outcome: PROGRESSING AS EXPECTED  Note: Patient's room close to nursing station, bed in the lowest position, call light within reach, hourly rounding on the pt.       Problem: Pain Management  Goal: Pain level will decrease to patient's comfort goal  Outcome: PROGRESSING AS EXPECTED  Note: Patient encouraged to call for assistance when pain outside of pain goal. Pain goal established with the patient. Pain level assessed, PRN pain needs provided as prescribed.      Problem: Respiratory:  Goal: Respiratory status will improve  Outcome: PROGRESSING AS EXPECTED  Note: Patient on continuous pulse oximetry. Patient's work of breathing and shortness of breath assessed and addressed as appropriate. Patient's head of bed elevated.

## 2020-07-16 NOTE — PROGRESS NOTES
Nephrology Daily Progress Note    Date of Service  7/16/2020    Chief Complaint  69 y.o. female with a history of CKD 3, COPD who presented 6/30/2020 with shortness of breath, found to have bradycardia, hypotension, and HAN.    Interval Problem Update  7/1 -patient had dialysis yesterday with no fluid removed, and resolution of shock on completion of dialysis.  Patient had 500 mL of urine output documented last 24 hours.  Patient had dialysis again this morning with 1 L removed.  Patient says her breathing is better.  Denies chest pain, shortness of breath at this time.  7/3 -patient had third session of dialysis yesterday with 2 L removed.  Tolerated well.  Denies chest pain this morning.  Complains of continued shortness of breath, but improved from admission.  7/5 -patient had dialysis yesterday with 1.5 L removed.  Patient says she is urinating, but no urine output being recorded.  Patient says her shortness of breath is still there, but better compared to admission.  Patient denies chest pain.  7/6 -doing better, less SOB  Scheduled for HD TTS  7/9 -doing well, improved SOB  UOP 1500 cc/24 H  Holding dialysis -watching for recovery  7/10 -doing well  Edema improving  SOB better  Good UOP  Holding dialysis  7/11 -doing well, no complaints  SOB and edema improved  Good UOP  Off HD  7/12 -no acute events, no complaints  Good UOP  Creat improving  Off HD  7/13 patient is doing better , still dyspnea on exertion , no chest pain, creatinine is worse.  7/14 patient is complaining of back pain, no chest pain  7/15 patient refused diuretic therapy at this morning  7/16 patient is doing better today, no chest pain no shortness of breath  review of Systems  Review of Systems   Constitutional: Negative for chills, fever and malaise/fatigue.   Respiratory: Negative for cough and shortness of breath.    Cardiovascular: Negative for chest pain and leg swelling.   Gastrointestinal: Negative for nausea and vomiting.    Genitourinary: Negative for dysuria, frequency and urgency.        Physical Exam  Temp:  [36.3 °C (97.4 °F)-37.1 °C (98.7 °F)] 36.7 °C (98.1 °F)  Pulse:  [59-93] 93  Resp:  [16-22] 18  BP: (113-150)/(31-66) 140/50  SpO2:  [92 %-98 %] 97 %    Physical Exam  Vitals signs and nursing note reviewed.   Constitutional:       General: She is not in acute distress.     Appearance: Normal appearance. She is well-developed. She is ill-appearing. She is not diaphoretic.   HENT:      Head: Normocephalic and atraumatic.      Right Ear: External ear normal.      Left Ear: External ear normal.      Nose: Nose normal.   Eyes:      General: No scleral icterus.        Right eye: No discharge.         Left eye: No discharge.      Conjunctiva/sclera: Conjunctivae normal.   Cardiovascular:      Rate and Rhythm: Normal rate and regular rhythm.      Heart sounds: No murmur.   Pulmonary:      Effort: Pulmonary effort is normal. No respiratory distress.      Breath sounds: Normal breath sounds.   Musculoskeletal:         General: No tenderness.      Right lower leg: Edema present.      Left lower leg: Edema present.   Skin:     General: Skin is warm and dry.      Findings: No erythema.   Neurological:      General: No focal deficit present.      Mental Status: She is alert and oriented to person, place, and time.      Cranial Nerves: No cranial nerve deficit.   Psychiatric:         Mood and Affect: Mood normal.         Behavior: Behavior normal.     Access: Right IJ temporary dialysis catheter.      Fluids    Intake/Output Summary (Last 24 hours) at 7/16/2020 1402  Last data filed at 7/16/2020 1115  Gross per 24 hour   Intake 600 ml   Output 3450 ml   Net -2850 ml       Laboratory  Labs reviewed, pertinent labs below.  Recent Labs     07/15/20  0358 07/16/20  0415 07/16/20  1308   WBC 8.1 6.7  --    RBC 2.85* 2.02*  --    HEMOGLOBIN 8.7* 6.3* 7.8*   HEMATOCRIT 27.8* 20.0* 24.4*   MCV 97.5 99.0*  --    MCH 30.5 31.2  --    MCHC 31.3* 31.5*   --    RDW 50.7* 50.8*  --    PLATELETCT 109* 104*  --    MPV 8.5* 8.8*  --      Recent Labs     07/14/20  0150 07/15/20  0358 07/16/20  0415   SODIUM 134* 135 136   POTASSIUM 5.4 5.7* 4.5   CHLORIDE 97 97 97   CO2 24 26 29   GLUCOSE 109* 136* 103*   BUN 47* 50* 51*   CREATININE 2.74* 2.82* 2.81*   CALCIUM 8.5 8.6 8.5               URINALYSIS:  Lab Results   Component Value Date/Time    COLORURINE DK Yellow 06/15/2019 0245    CLARITY Turbid (A) 06/15/2019 0245    SPECGRAVITY 1.020 06/15/2019 0245    PHURINE 5.0 06/15/2019 0245    KETONES Trace (A) 06/15/2019 0245    PROTEINURIN 300 (A) 06/15/2019 0245    BILIRUBINUR Negative 06/15/2019 0245    UROBILU 1.0 06/15/2019 0245    NITRITE Negative 06/15/2019 0245    LEUKESTERAS Negative 06/15/2019 0245    OCCULTBLOOD Negative 06/15/2019 0245     UPC  No results found for: TOTPROTUR No results found for: CREATININEU      Imaging reviewed  DX-CHEST-PORTABLE (1 VIEW)   Final Result         No significant interval change.      Unchanged diffuse interstitial prominence.      EC-ECHOCARDIOGRAM COMPLETE W/O CONT   Final Result      US-RUQ   Final Result      Cholelithiasis with mild gallbladder wall thickening but no positive sonographic Troncoso sign. This could indicate chronic cholecystitis or incomplete distention      DX-CHEST-PORTABLE (1 VIEW)   Final Result      1.  Satisfactory position of new right IJV multilumen dialysis catheter. No pneumothorax identified.      2.  Unchanged mild volume overload pattern.      DX-CHEST-PORTABLE (1 VIEW)   Final Result      No acute cardiac or pulmonary abnormality is noted. Stable cardiomegaly with increased interstitial opacity diffusely.            Current Facility-Administered Medications   Medication Dose Route Frequency Provider Last Rate Last Dose   • SODIUM CHLORIDE 0.9 % IV SOLN            • nystatin (MYCOSTATIN) cream   Topical BID MARLO Oliveira       • furosemide (LASIX) tablet 80 mg  80 mg Oral BID DIURETIC Alban  Najjar, M.D.   80 mg at 07/16/20 0603   • fluticasone (FLOVENT HFA) 44 MCG/ACT inhaler 88 mcg  2 Puff Inhalation QDAILY (RT) Roger Florence Jr., D.O.   88 mcg at 07/16/20 0619   • amLODIPine (NORVASC) tablet 5 mg  5 mg Oral Q DAY GHULAM DimasOMelonie   5 mg at 07/16/20 0603   • heparin injection 1,500 Units  1,500 Units Intravenous DIALYSIS PRN Rosi Bansal M.D.   1,500 Units at 07/08/20 1430   • ipratropium-albuterol (DUONEB) nebulizer solution  3 mL Nebulization Q2HRS PRN (RT) Christina Calderon M.D.       • hydrALAZINE (APRESOLINE) tablet 25 mg  25 mg Oral Q8HRS PRN Christina Calderon M.D.   25 mg at 07/10/20 1647   • heparin injection 2,000 Units  2,000 Units Intravenous DIALYSIS PRN Eric Pompa M.D.   2,000 Units at 07/07/20 1439   • oxyCODONE immediate release (ROXICODONE) tablet 10 mg  10 mg Oral TID PRN Christina Calderon M.D.   10 mg at 07/16/20 0026   • lactulose 20 GM/30ML solution 30 mL  30 mL Oral BID W/MEALS PRN Heber Armstrong M.D.       • NS (BOLUS) infusion 250 mL  250 mL Intravenous DIALYSIS PRN Eric Pompa M.D.       • albumin human 25% solution 12.5 g  12.5 g Intravenous DIALYSIS PRN Eric Pompa M.D. 150 mL/hr at 06/30/20 1513 12.5 g at 06/30/20 1513   • Respiratory Therapy Consult   Nebulization Continuous RT Roger Florence Jr., D.O.       • DULoxetine (CYMBALTA) capsule 60 mg  60 mg Oral DAILY Roger Florence Jr., D.O.   60 mg at 07/16/20 0604   • hydrOXYzine HCl (ATARAX) tablet 25 mg  25 mg Oral TID PRN Roger Florence Jr., D.O.   25 mg at 07/10/20 1642   • levothyroxine (SYNTHROID) tablet 75 mcg  75 mcg Oral AM ES Roger Florence Jr., D.O.   75 mcg at 07/16/20 0601   • lidocaine (LIDODERM) 5 % 1 Patch  1 Patch Transdermal Q24HRS PRN Roger Florence Jr., D.O.   1 Patch at 07/04/20 2211   • traZODone (DESYREL) tablet 300 mg  300 mg Oral BID Roger Florence Jr., D.O.   300 mg at 07/16/20 0601   • rosuvastatin (CRESTOR) tablet 10 mg  10 mg Oral Q EVENING Roger Florence Jr., D.O.   10 mg at 07/15/20 1725   •  senna-docusate (PERICOLACE or SENOKOT S) 8.6-50 MG per tablet 2 Tab  2 Tab Oral BID ROBY Sarmiento Jr..O.   2 Tab at 07/16/20 0601    And   • polyethylene glycol/lytes (MIRALAX) PACKET 1 Packet  1 Packet Oral QDAY PRN ROBY Sarmiento Jr..O.   1 Packet at 07/02/20 0500    And   • magnesium hydroxide (MILK OF MAGNESIA) suspension 30 mL  30 mL Oral QDAY PRN ROBY Sarmiento Jr..OMelonie        And   • bisacodyl (DULCOLAX) suppository 10 mg  10 mg Rectal QDAY PRN ROBY Sarmiento Jr..O.       • ondansetron (ZOFRAN) syringe/vial injection 4 mg  4 mg Intravenous Q4HRS PRN ROBY Sarmiento Jr..O.       • ondansetron (ZOFRAN ODT) dispertab 4 mg  4 mg Oral Q4HRS PRN ROBY Sarmiento Jr..O.       • Pharmacy Consult Request ...Pain Management Review 1 Each  1 Each Other PHARMACY TO DOSE ROBY Sarmiento Jr..O.       • lactated ringers infusion (BOLUS)  500 mL Intravenous Once PRN ROBY Sarmiento Jr..O.       • umeclidinium-vilanterol (ANORO ELLIPTA) inhaler 1 Puff  1 Puff Inhalation QDAILY (RT) ROBY Sarmiento Jr..O.   1 Puff at 07/15/20 0745   • ipratropium-albuterol (DUONEB) nebulizer solution  3 mL Nebulization Q2HRS PRN (RT) Heber Armstrong M.D.   3 mL at 07/14/20 0608   • heparin injection 2,400 Units  2,400 Units Intracatheter ACUTE DIALYSIS PRN Eric Pompa M.D.   2,400 Units at 07/08/20 1648   • insulin regular (HUMULIN R) injection 1-6 Units  1-6 Units Subcutaneous 4X/DAY ACHS Bob Glover M.D.   3 Units at 07/16/20 1124    And   • glucose 4 g chewable tablet 16 g  16 g Oral Q15 MIN PRN Bob Glover M.D.        And   • dextrose 50% (D50W) injection 50 mL  50 mL Intravenous Q15 MIN PRN Bob Glover M.D.             Assessment/Plan  69 y.o. female with a history of CKD 3, COPD who presented 6/30/2020 with shortness of breath, found to have bradycardia, hypotension, and HAN.     1.  HAN on CKD stage III : Improved  2.  HTN: Blood pressure on the low side  3.  Volume overload:  Improving  4.  Hyponatremia: Better  5.  Anemia: d stable   6.  Cardiogenic shock.   7   hyperkalemia  Recs:   no need for HD  Continue Lasix  Renal diet  Daily labs  Renal dose all meds  Avoid nephrotoxins  Prognosis guarded.

## 2020-07-16 NOTE — PROGRESS NOTES
Patient lying in bed, calm with unlabored breathing, reports pain, prn pain medication administered. Perineal and pannus area cleansed with soap and water,nystatin cream applied. All other needs are assessed, bed in lowest position, call light within reach. Fall precautions in place.

## 2020-07-17 LAB
ALBUMIN SERPL BCP-MCNC: 3.2 G/DL (ref 3.2–4.9)
ALBUMIN/GLOB SERPL: 1.2 G/DL
ALP SERPL-CCNC: 83 U/L (ref 30–99)
ALT SERPL-CCNC: 195 U/L (ref 2–50)
ANION GAP SERPL CALC-SCNC: 11 MMOL/L (ref 7–16)
AST SERPL-CCNC: 126 U/L (ref 12–45)
BILIRUB SERPL-MCNC: 0.7 MG/DL (ref 0.1–1.5)
BUN SERPL-MCNC: 50 MG/DL (ref 8–22)
CALCIUM SERPL-MCNC: 8.6 MG/DL (ref 8.5–10.5)
CHLORIDE SERPL-SCNC: 96 MMOL/L (ref 96–112)
CO2 SERPL-SCNC: 30 MMOL/L (ref 20–33)
CREAT SERPL-MCNC: 2.52 MG/DL (ref 0.5–1.4)
ERYTHROCYTE [DISTWIDTH] IN BLOOD BY AUTOMATED COUNT: 50.5 FL (ref 35.9–50)
GLOBULIN SER CALC-MCNC: 2.7 G/DL (ref 1.9–3.5)
GLUCOSE BLD-MCNC: 132 MG/DL (ref 65–99)
GLUCOSE BLD-MCNC: 144 MG/DL (ref 65–99)
GLUCOSE BLD-MCNC: 146 MG/DL (ref 65–99)
GLUCOSE BLD-MCNC: 252 MG/DL (ref 65–99)
GLUCOSE SERPL-MCNC: 107 MG/DL (ref 65–99)
HCT VFR BLD AUTO: 25.2 % (ref 37–47)
HGB BLD-MCNC: 8.1 G/DL (ref 12–16)
MCH RBC QN AUTO: 30.5 PG (ref 27–33)
MCHC RBC AUTO-ENTMCNC: 32.1 G/DL (ref 33.6–35)
MCV RBC AUTO: 94.7 FL (ref 81.4–97.8)
PLATELET # BLD AUTO: 130 K/UL (ref 164–446)
PMV BLD AUTO: 8.7 FL (ref 9–12.9)
POTASSIUM SERPL-SCNC: 4.5 MMOL/L (ref 3.6–5.5)
PROT SERPL-MCNC: 5.9 G/DL (ref 6–8.2)
RBC # BLD AUTO: 2.66 M/UL (ref 4.2–5.4)
SODIUM SERPL-SCNC: 137 MMOL/L (ref 135–145)
WBC # BLD AUTO: 9 K/UL (ref 4.8–10.8)

## 2020-07-17 PROCEDURE — 85027 COMPLETE CBC AUTOMATED: CPT

## 2020-07-17 PROCEDURE — 700102 HCHG RX REV CODE 250 W/ 637 OVERRIDE(OP): Performed by: INTERNAL MEDICINE

## 2020-07-17 PROCEDURE — 94760 N-INVAS EAR/PLS OXIMETRY 1: CPT

## 2020-07-17 PROCEDURE — 99232 SBSQ HOSP IP/OBS MODERATE 35: CPT | Performed by: INTERNAL MEDICINE

## 2020-07-17 PROCEDURE — A9270 NON-COVERED ITEM OR SERVICE: HCPCS | Performed by: INTERNAL MEDICINE

## 2020-07-17 PROCEDURE — 82962 GLUCOSE BLOOD TEST: CPT | Mod: 91

## 2020-07-17 PROCEDURE — 770020 HCHG ROOM/CARE - TELE (206)

## 2020-07-17 PROCEDURE — 94640 AIRWAY INHALATION TREATMENT: CPT

## 2020-07-17 PROCEDURE — 80053 COMPREHEN METABOLIC PANEL: CPT

## 2020-07-17 PROCEDURE — A9270 NON-COVERED ITEM OR SERVICE: HCPCS | Performed by: HOSPITALIST

## 2020-07-17 PROCEDURE — 700102 HCHG RX REV CODE 250 W/ 637 OVERRIDE(OP): Performed by: HOSPITALIST

## 2020-07-17 RX ADMIN — UMECLIDINIUM BROMIDE AND VILANTEROL TRIFENATATE 1 PUFF: 62.5; 25 POWDER RESPIRATORY (INHALATION) at 07:06

## 2020-07-17 RX ADMIN — LEVOTHYROXINE SODIUM 75 MCG: 75 TABLET ORAL at 06:24

## 2020-07-17 RX ADMIN — OXYCODONE HYDROCHLORIDE 10 MG: 10 TABLET ORAL at 13:46

## 2020-07-17 RX ADMIN — FUROSEMIDE 80 MG: 40 TABLET ORAL at 08:18

## 2020-07-17 RX ADMIN — TRAZODONE HYDROCHLORIDE 300 MG: 150 TABLET ORAL at 06:24

## 2020-07-17 RX ADMIN — OXYCODONE HYDROCHLORIDE 10 MG: 10 TABLET ORAL at 21:53

## 2020-07-17 RX ADMIN — INSULIN HUMAN 3 UNITS: 100 INJECTION, SOLUTION PARENTERAL at 11:57

## 2020-07-17 RX ADMIN — TRAZODONE HYDROCHLORIDE 300 MG: 150 TABLET ORAL at 21:10

## 2020-07-17 RX ADMIN — AMLODIPINE BESYLATE 5 MG: 5 TABLET ORAL at 06:25

## 2020-07-17 RX ADMIN — DULOXETINE HYDROCHLORIDE 60 MG: 60 CAPSULE, DELAYED RELEASE ORAL at 06:24

## 2020-07-17 RX ADMIN — FLUTICASONE PROPIONATE 88 MCG: 44 AEROSOL, METERED RESPIRATORY (INHALATION) at 07:06

## 2020-07-17 RX ADMIN — OXYCODONE HYDROCHLORIDE 10 MG: 10 TABLET ORAL at 06:24

## 2020-07-17 RX ADMIN — NYSTATIN: 100000 CREAM TOPICAL at 06:24

## 2020-07-17 RX ADMIN — ROSUVASTATIN CALCIUM 10 MG: 20 TABLET, FILM COATED ORAL at 16:52

## 2020-07-17 RX ADMIN — FUROSEMIDE 80 MG: 40 TABLET ORAL at 16:53

## 2020-07-17 ASSESSMENT — ENCOUNTER SYMPTOMS
SHORTNESS OF BREATH: 0
LOSS OF CONSCIOUSNESS: 0
HEADACHES: 0
DIZZINESS: 0
MYALGIAS: 0
VOMITING: 0
CHILLS: 0
CLAUDICATION: 0
NERVOUS/ANXIOUS: 0
COUGH: 0
NECK PAIN: 0
FEVER: 0
NAUSEA: 0
FOCAL WEAKNESS: 0
WEAKNESS: 1
ABDOMINAL PAIN: 0
SORE THROAT: 0

## 2020-07-17 ASSESSMENT — PAIN SCALES - WONG BAKER: WONGBAKER_NUMERICALRESPONSE: HURTS A LITTLE MORE

## 2020-07-17 ASSESSMENT — FIBROSIS 4 INDEX: FIB4 SCORE: 4.86

## 2020-07-17 NOTE — PROGRESS NOTES
Nephrology Daily Progress Note    Date of Service  7/17/2020    Chief Complaint  69 y.o. female with a history of CKD 3, COPD who presented 6/30/2020 with shortness of breath, found to have bradycardia, hypotension, and HAN.    Interval Problem Update  7/1 -patient had dialysis yesterday with no fluid removed, and resolution of shock on completion of dialysis.  Patient had 500 mL of urine output documented last 24 hours.  Patient had dialysis again this morning with 1 L removed.  Patient says her breathing is better.  Denies chest pain, shortness of breath at this time.  7/3 -patient had third session of dialysis yesterday with 2 L removed.  Tolerated well.  Denies chest pain this morning.  Complains of continued shortness of breath, but improved from admission.  7/5 -patient had dialysis yesterday with 1.5 L removed.  Patient says she is urinating, but no urine output being recorded.  Patient says her shortness of breath is still there, but better compared to admission.  Patient denies chest pain.  7/6 -doing better, less SOB  Scheduled for HD TTS  7/9 -doing well, improved SOB  UOP 1500 cc/24 H  Holding dialysis -watching for recovery  7/10 -doing well  Edema improving  SOB better  Good UOP  Holding dialysis  7/11 -doing well, no complaints  SOB and edema improved  Good UOP  Off HD  7/12 -no acute events, no complaints  Good UOP  Creat improving  Off HD  7/13 patient is doing better , still dyspnea on exertion , no chest pain, creatinine is worse.  7/14 patient is complaining of back pain, no chest pain  7/15 patient refused diuretic therapy at this morning  7/16 patient is doing better today, no chest pain no shortness of breath  7/17 patient has no chest pain no shortness of breath  review of Systems  Review of Systems   Constitutional: Negative for chills, fever and malaise/fatigue.   Respiratory: Negative for cough and shortness of breath.    Cardiovascular: Negative for chest pain and leg swelling.    Gastrointestinal: Negative for nausea and vomiting.   Genitourinary: Negative for dysuria, frequency and urgency.        Physical Exam  Temp:  [36.4 °C (97.6 °F)-36.9 °C (98.5 °F)] 36.9 °C (98.5 °F)  Pulse:  [64-95] 72  Resp:  [16-18] 18  BP: (119-156)/(42-79) 132/42  SpO2:  [90 %-98 %] 90 %    Physical Exam  Vitals signs and nursing note reviewed.   Constitutional:       General: She is not in acute distress.     Appearance: Normal appearance. She is well-developed. She is not diaphoretic.   HENT:      Head: Normocephalic and atraumatic.      Right Ear: External ear normal.      Left Ear: External ear normal.      Nose: Nose normal.   Eyes:      General: No scleral icterus.        Right eye: No discharge.         Left eye: No discharge.      Conjunctiva/sclera: Conjunctivae normal.   Cardiovascular:      Rate and Rhythm: Normal rate and regular rhythm.      Heart sounds: No murmur.   Pulmonary:      Effort: Pulmonary effort is normal. No respiratory distress.      Breath sounds: Normal breath sounds.   Musculoskeletal:         General: No tenderness.      Right lower leg: No edema.      Left lower leg: No edema.   Skin:     General: Skin is warm and dry.      Findings: No erythema.   Neurological:      General: No focal deficit present.      Mental Status: She is alert and oriented to person, place, and time.      Cranial Nerves: No cranial nerve deficit.   Psychiatric:         Mood and Affect: Mood normal.         Behavior: Behavior normal.     Access: Right IJ temporary dialysis catheter.      Fluids    Intake/Output Summary (Last 24 hours) at 7/17/2020 1150  Last data filed at 7/17/2020 0800  Gross per 24 hour   Intake 360 ml   Output 2300 ml   Net -1940 ml       Laboratory  Labs reviewed, pertinent labs below.  Recent Labs     07/15/20  0358 07/16/20  0415 07/16/20  1308 07/17/20  0350   WBC 8.1 6.7  --  9.0   RBC 2.85* 2.02*  --  2.66*   HEMOGLOBIN 8.7* 6.3* 7.8* 8.1*   HEMATOCRIT 27.8* 20.0* 24.4* 25.2*    MCV 97.5 99.0*  --  94.7   MCH 30.5 31.2  --  30.5   MCHC 31.3* 31.5*  --  32.1*   RDW 50.7* 50.8*  --  50.5*   PLATELETCT 109* 104*  --  130*   MPV 8.5* 8.8*  --  8.7*     Recent Labs     07/15/20  0358 07/16/20  0415 07/17/20  0350   SODIUM 135 136 137   POTASSIUM 5.7* 4.5 4.5   CHLORIDE 97 97 96   CO2 26 29 30   GLUCOSE 136* 103* 107*   BUN 50* 51* 50*   CREATININE 2.82* 2.81* 2.52*   CALCIUM 8.6 8.5 8.6               URINALYSIS:  Lab Results   Component Value Date/Time    COLORURINE DK Yellow 06/15/2019 0245    CLARITY Turbid (A) 06/15/2019 0245    SPECGRAVITY 1.020 06/15/2019 0245    PHURINE 5.0 06/15/2019 0245    KETONES Trace (A) 06/15/2019 0245    PROTEINURIN 300 (A) 06/15/2019 0245    BILIRUBINUR Negative 06/15/2019 0245    UROBILU 1.0 06/15/2019 0245    NITRITE Negative 06/15/2019 0245    LEUKESTERAS Negative 06/15/2019 0245    OCCULTBLOOD Negative 06/15/2019 0245     UPC  No results found for: TOTPROTUR No results found for: CREATININEU      Imaging reviewed  DX-CHEST-PORTABLE (1 VIEW)   Final Result         No significant interval change.      Unchanged diffuse interstitial prominence.      EC-ECHOCARDIOGRAM COMPLETE W/O CONT   Final Result      US-RUQ   Final Result      Cholelithiasis with mild gallbladder wall thickening but no positive sonographic Troncoso sign. This could indicate chronic cholecystitis or incomplete distention      DX-CHEST-PORTABLE (1 VIEW)   Final Result      1.  Satisfactory position of new right IJV multilumen dialysis catheter. No pneumothorax identified.      2.  Unchanged mild volume overload pattern.      DX-CHEST-PORTABLE (1 VIEW)   Final Result      No acute cardiac or pulmonary abnormality is noted. Stable cardiomegaly with increased interstitial opacity diffusely.            Current Facility-Administered Medications   Medication Dose Route Frequency Provider Last Rate Last Dose   • nystatin (MYCOSTATIN) cream   Topical BID MARLO Oliveira       • furosemide  (LASIX) tablet 80 mg  80 mg Oral BID DIURETIC Fadi Najjar, M.D.   80 mg at 07/17/20 0818   • fluticasone (FLOVENT HFA) 44 MCG/ACT inhaler 88 mcg  2 Puff Inhalation QDAILY (RT) Roger Florence Jr., D.O.   88 mcg at 07/17/20 0706   • amLODIPine (NORVASC) tablet 5 mg  5 mg Oral Q DAY GHULAM DimasOMelonie   5 mg at 07/17/20 0625   • heparin injection 1,500 Units  1,500 Units Intravenous DIALYSIS PRN Rosi Bansal M.D.   1,500 Units at 07/08/20 1430   • ipratropium-albuterol (DUONEB) nebulizer solution  3 mL Nebulization Q2HRS PRN (RT) Christina Calderon M.D.       • hydrALAZINE (APRESOLINE) tablet 25 mg  25 mg Oral Q8HRS PRN Christina Calderon M.D.   25 mg at 07/10/20 1647   • heparin injection 2,000 Units  2,000 Units Intravenous DIALYSIS PRN Eric Pompa M.D.   2,000 Units at 07/07/20 1439   • oxyCODONE immediate release (ROXICODONE) tablet 10 mg  10 mg Oral TID PRN Christina Calderon M.D.   10 mg at 07/17/20 0624   • lactulose 20 GM/30ML solution 30 mL  30 mL Oral BID W/MEALS PRN Heber Armstrong M.D.       • NS (BOLUS) infusion 250 mL  250 mL Intravenous DIALYSIS PRN Eric Pompa M.D.       • albumin human 25% solution 12.5 g  12.5 g Intravenous DIALYSIS PRN Eric Pompa M.D. 150 mL/hr at 06/30/20 1513 12.5 g at 06/30/20 1513   • Respiratory Therapy Consult   Nebulization Continuous RT Roger Florence Jr., D.O.       • DULoxetine (CYMBALTA) capsule 60 mg  60 mg Oral DAILY Roger Florence Jr., D.O.   60 mg at 07/17/20 0624   • hydrOXYzine HCl (ATARAX) tablet 25 mg  25 mg Oral TID PRN Roger Florence Jr., D.O.   25 mg at 07/10/20 1642   • levothyroxine (SYNTHROID) tablet 75 mcg  75 mcg Oral AM ES Roger Florence Jr., D.O.   75 mcg at 07/17/20 0624   • lidocaine (LIDODERM) 5 % 1 Patch  1 Patch Transdermal Q24HRS PRN Roger Florence Jr., D.O.   1 Patch at 07/04/20 2211   • traZODone (DESYREL) tablet 300 mg  300 mg Oral BID Roger Florence Jr., D.O.   300 mg at 07/17/20 0624   • rosuvastatin (CRESTOR) tablet 10 mg  10 mg Oral Q EVENING  Roger Florence Jr. D.O.   10 mg at 07/16/20 1751   • senna-docusate (PERICOLACE or SENOKOT S) 8.6-50 MG per tablet 2 Tab  2 Tab Oral BID ROBY Sarmiento Jr..O.   2 Tab at 07/16/20 0601    And   • polyethylene glycol/lytes (MIRALAX) PACKET 1 Packet  1 Packet Oral QDAY PRN ROBY Sarmiento Jr..O.   1 Packet at 07/02/20 0500    And   • magnesium hydroxide (MILK OF MAGNESIA) suspension 30 mL  30 mL Oral QDAY PRN ROBY Sarmiento Jr..OMelonie        And   • bisacodyl (DULCOLAX) suppository 10 mg  10 mg Rectal QDAY PRN ROBY Sarmiento Jr..O.       • ondansetron (ZOFRAN) syringe/vial injection 4 mg  4 mg Intravenous Q4HRS PRN ROBY Sarmiento Jr..O.       • ondansetron (ZOFRAN ODT) dispertab 4 mg  4 mg Oral Q4HRS PRN ROBY Sarmiento Jr..O.       • Pharmacy Consult Request ...Pain Management Review 1 Each  1 Each Other PHARMACY TO DOSE ROBY Sarmiento Jr..O.       • lactated ringers infusion (BOLUS)  500 mL Intravenous Once PRN ROBY Sarmiento Jr..O.       • umeclidinium-vilanterol (ANORO ELLIPTA) inhaler 1 Puff  1 Puff Inhalation QDAILY (RT) ROBY Sarmiento Jr..O.   1 Puff at 07/17/20 0706   • heparin injection 2,400 Units  2,400 Units Intracatheter ACUTE DIALYSIS PRN Eric Pompa M.D.   2,400 Units at 07/08/20 1648   • insulin regular (HUMULIN R) injection 1-6 Units  1-6 Units Subcutaneous 4X/DAY ACHS Bob Glover M.D.   Stopped at 07/17/20 0700    And   • glucose 4 g chewable tablet 16 g  16 g Oral Q15 MIN PRN Bob Glover M.D.        And   • dextrose 50% (D50W) injection 50 mL  50 mL Intravenous Q15 MIN PRN Bob Glover M.D.             Assessment/Plan  69 y.o. female with a history of CKD 3, COPD who presented 6/30/2020 with shortness of breath, found to have bradycardia, hypotension, and HAN.     1.  HAN on CKD stage III : Improved  2.  HTN: Blood pressure is good  3.  Volume overload: Improving  4.  Hyponatremia: Better  5.  Anemia: d stable   6.  Cardiogenic shock.   7    Hyperkalemia: Improved  Recs:   no need for HD  Remove hemodialysis catheter  Renal diet  Daily labs  Renal dose all meds  Avoid nephrotoxins  Prognosis guarded.

## 2020-07-17 NOTE — CARE PLAN
Problem: Safety  Goal: Will remain free from injury  Outcome: PROGRESSING AS EXPECTED  Note: Patient's room close to nursing station, bed in the lowest position, call light within reach, hourly rounding on the pt.       Problem: Infection  Goal: Will remain free from infection  Outcome: PROGRESSING AS EXPECTED  Note: Contact precautions in place and implemented with pt. care and frequent handwashing in between care.      Problem: Pain Management  Goal: Pain level will decrease to patient's comfort goal  Outcome: PROGRESSING AS EXPECTED  Note: Patient encouraged to call for assistance when pain outside of pain goal. Pain goal established with the patient. Pain level assessed, PRN pain needs provided as prescribed.      Problem: Respiratory:  Goal: Respiratory status will improve  Outcome: PROGRESSING AS EXPECTED  Note: Patient on continuous pulse oximetry. Patient's work of breathing and shortness of breath assessed and addressed as appropriate. Patient's head of bed elevated.

## 2020-07-17 NOTE — PROGRESS NOTES
Hemodialysis catheter taken out. Pressure applied, VSS, no signs of distress, will continue to monitor site/.

## 2020-07-17 NOTE — CARE PLAN
Problem: Safety  Goal: Will remain free from falls  Outcome: PROGRESSING AS EXPECTED     Patient's risk for injury and falls assessed. Appropriate safety precautions in place. Patient educated to utilize call light for needs. Patient verbalizes understanding.     Problem: Venous Thromboembolism (VTW)/Deep Vein Thrombosis (DVT) Prevention:  Goal: Patient will participate in Venous Thrombosis (VTE)/Deep Vein Thrombosis (DVT)Prevention Measures  Outcome: PROGRESSING AS EXPECTED     Patient educated on DVT risks. Patient VS are stable. No signs of DVT. Will continue to monitor for change status.

## 2020-07-17 NOTE — PROGRESS NOTES
Report received at bedside from NOC RN at 0700, pt care assumed, tele box on. Pt A&Ox4, no signs of distress noted at this time. Patient resting comfortably in bed. POC discussed with pt and verbalizes no questions. Pt states she has 6/10 pain all over hear body, will continue to monitor. Bed in lowest position, pt educated on fall risk and verbalized understanding, call light within reach, bed alarm plugged in and on.

## 2020-07-17 NOTE — PROGRESS NOTES
Hospital Medicine Daily Progress Note    Date of Service  7/17/2020    Chief Complaint  69 y.o. female admitted 6/30/2020 with junctional bradycardia rate 37 with cardiogenic shock, SOB.    Hospital Course    7/2:  This 70 yo female admitted by critical care to ICU for HR 37 junctional rhythm, COPD on 4 LPM NC at home, DM, hypothyroidism, hepatitis C, HTN, CHF, CKD stage 4 followed by Dr. Nava presented with worsening SOB, hypoxia and cough.  She was recently admitted 6/15/20 for COPD exacerbation and pulmonary edema, given amlodopine, metoprolol and lasix.   COVID 19 negative x3.  K was elevated 6.3.  Nephrology consulted and started HD with improvement of HR, BP and SOB.  3 HD sessions performed including today with 2 L removed.  She is feeling better, but still swollen in extremities, on 4 LPM NC baseline O2, ready for dc out of ICU.  Her BP is currently controlled at 97/69, no BP meds given at this point.  K normalized and Cr imporved.  Wbc increased to 23K on IV steroids.  I have started oral taper from solumedrol 60 IV q 12 to 40mg po daily.  It is unclear why patient was started on Rocephin on admission.  I have ordered a urine culture, no UA on admission or culture.  No evidence for pneumonia.  7/3:  Patient still with orthopnea, but no wheeze, wet cough noted today.  No HD today, waiting until tomorrow.  Has temporary triple lumen RIJ.  Wbc decreasing, Cr improving 3.24 to 2.58.  EF 80% but RVP elevated 55-60 with moderate mitral regurg noted. SR 76-92.  7/4:  Remains NSR on monitor.  No SOB, had 1.5 L removed during HD today.  Cr improving, urine output improving daily.  States having productive sputum thick brown, ordered sputum culture.  Lungs CTA b/l.  7/5:  No change, continues on baseline O2 4 LPM NC, no wheeze or crackles.  7/6:  C/o productive sputum, had 5 days of Rocephin, zithromax completed.  Ordered sputum culture, sent today with thick brown.  Wbc increased last 2 days, repeat CXR no  infiltrate, improved pulmonary edema since 6/30 CXR.  + VRE on urine culture, started zyvox bid x 7d ays.*    7/7: Patient reports making 250 mL urine output  Denies any dysuria  Plan for hemodialysis today  On Zyvox  She reports generalized weakness, will likely need placement, from assisted living facility  7/8 we will monitor ins and outs closely  Discussed with nephrology, Dr. Mccabe  Denies dysuria  Generalized weakness, requiring 1 person assist  Encourage activity  Discussed with RN  Baseline oxygen needs of 4 L, will taper as tolerated  Anemia requiring transfusion, will monitor closely  Reports lethargy today  7/9 no new complaints  Patient reports urine output  Per nephrology, will start trial of Lasix  Hemodialysis per nephrology  7/10 improving shortness of breath  Improving urinary output of greater than 1200 mL's  Dialysis per nephrology  Encourage activity  7/11 SBA per staff  Poor urine output, 400 ml/24 hours  7/12  Improved urine output  1800ml/24 hours  -deconditioned, snf referral  Encourage IS use  Improved urine output, for HD catheter removal per nephro  7/13 poor urine output overnight, 585/20 4 hours  On Lasix  Potassium of 5.4    7/14: Pt seen and examined no acute events overnight, feels SOB, denies any chest pain. No nausea or vomiting.  Nephrology following, no need for dialysis anymore  Appreciate rec.     7/15Pt resting in bed still with SOB, refused lasix this AM, discussed with pt regarding it and now agrees to take it.  Afebrile, potassium 5.7 this AM, will give a dose of kayexalate  Nephrology also following appreciate rec    7/16: Pt seen and examined, hemoglobin 6.2 this AM, received 1 unit of RBC now hemoglobin 7.8.  Still SOB c cont on aggressive diuresis   Nephrology following appreciate rec.   Hyperkalemia improving     7/17: No acute events overnight, afebrile, hemoglobin better this AM.  Cont on aggressive diuresis,   Hyperkalemia improved  Nephology following appreciate  rec.     Consultants/Specialty  Nephrology   Critical care    Code Status  DNAR, I ok    Disposition   getting HD per nephrology   Hep C +, no treatment yet, patient understands the benefits of treatment and will need to pursue outpatient tx.  Resides at [facility].  OT/PT no needs.  Baseline O2 5 LPM NC.    snf referral, CM to assist  Zyvox through July 13        Review of Systems  Review of Systems   Constitutional: Negative for chills and fever.   HENT: Negative for sore throat.    Respiratory: Negative for cough and shortness of breath.    Cardiovascular: Positive for leg swelling. Negative for chest pain and claudication.   Gastrointestinal: Negative for abdominal pain.   Genitourinary: Negative for dysuria.   Musculoskeletal: Negative for myalgias and neck pain.   Neurological: Positive for weakness. Negative for dizziness, focal weakness, loss of consciousness and headaches.   Psychiatric/Behavioral: The patient is not nervous/anxious.         Physical Exam  Temp:  [36.4 °C (97.6 °F)-36.9 °C (98.5 °F)] 36.9 °C (98.5 °F)  Pulse:  [64-95] 72  Resp:  [16-18] 18  BP: (115-156)/(42-79) 132/42  SpO2:  [90 %-98 %] 90 %    Physical Exam  Vitals signs and nursing note reviewed.   Constitutional:       General: She is not in acute distress.     Appearance: She is well-developed. She is not ill-appearing or toxic-appearing.   HENT:      Head: Normocephalic and atraumatic.      Nose: Nose normal.      Mouth/Throat:      Pharynx: No oropharyngeal exudate.   Eyes:      Extraocular Movements: Extraocular movements intact.      Pupils: Pupils are equal, round, and reactive to light.   Neck:      Musculoskeletal: Normal range of motion and neck supple.      Thyroid: No thyromegaly.      Vascular: No JVD.      Trachea: No tracheal deviation.   Cardiovascular:      Rate and Rhythm: Normal rate.      Pulses: Normal pulses.   Pulmonary:      Effort: Pulmonary effort is normal. No respiratory distress.      Breath sounds: Normal breath  sounds. No wheezing.   Abdominal:      General: Bowel sounds are normal. There is no distension.      Palpations: Abdomen is soft.   Musculoskeletal: Normal range of motion.         General: Swelling present. No tenderness.   Skin:     General: Skin is warm and dry.      Coloration: Skin is not pale.   Neurological:      Mental Status: She is alert and oriented to person, place, and time.      Cranial Nerves: No cranial nerve deficit.      Motor: Weakness present. No abnormal muscle tone.   Psychiatric:         Thought Content: Thought content normal.         Fluids    Intake/Output Summary (Last 24 hours) at 7/17/2020 1110  Last data filed at 7/17/2020 0800  Gross per 24 hour   Intake 660 ml   Output 2300 ml   Net -1640 ml       Laboratory  Recent Labs     07/15/20  0358 07/16/20  0415 07/16/20  1308 07/17/20  0350   WBC 8.1 6.7  --  9.0   RBC 2.85* 2.02*  --  2.66*   HEMOGLOBIN 8.7* 6.3* 7.8* 8.1*   HEMATOCRIT 27.8* 20.0* 24.4* 25.2*   MCV 97.5 99.0*  --  94.7   MCH 30.5 31.2  --  30.5   MCHC 31.3* 31.5*  --  32.1*   RDW 50.7* 50.8*  --  50.5*   PLATELETCT 109* 104*  --  130*   MPV 8.5* 8.8*  --  8.7*     Recent Labs     07/15/20  0358 07/16/20 0415 07/17/20  0350   SODIUM 135 136 137   POTASSIUM 5.7* 4.5 4.5   CHLORIDE 97 97 96   CO2 26 29 30   GLUCOSE 136* 103* 107*   BUN 50* 51* 50*   CREATININE 2.82* 2.81* 2.52*   CALCIUM 8.6 8.5 8.6                   Imaging  DX-CHEST-PORTABLE (1 VIEW)   Final Result         No significant interval change.      Unchanged diffuse interstitial prominence.      EC-ECHOCARDIOGRAM COMPLETE W/O CONT   Final Result      US-RUQ   Final Result      Cholelithiasis with mild gallbladder wall thickening but no positive sonographic Troncoso sign. This could indicate chronic cholecystitis or incomplete distention      DX-CHEST-PORTABLE (1 VIEW)   Final Result      1.  Satisfactory position of new right IJV multilumen dialysis catheter. No pneumothorax identified.      2.  Unchanged mild  volume overload pattern.      DX-CHEST-PORTABLE (1 VIEW)   Final Result      No acute cardiac or pulmonary abnormality is noted. Stable cardiomegaly with increased interstitial opacity diffusely.           Assessment/Plan  Hyperkalemia- (present on admission)  Assessment & Plan  Cont on lasix   Improved   Monitor       Acute renal failure superimposed on stage 4 chronic kidney disease (HCC)- (present on admission)  Assessment & Plan  Had recent admission, given lasix at discharge and new BP meds, metoprolol and norvasc.  HR 37 and low BP on admission.  Had HD emergently x 3 with improvement of SOB, 1 L off yesterday, 2 L off today.  Dr. Pompa following for HD ongoing determination, for now plans on TTS schedule.  Will need temporary HD catheter placed prior to dc and HD chair set up.  Patient states she gets better, then goes back to SOB for several months off and on.       7/7 HD per nephro, monitoring for renal recovery  7/8  S/p HD, ?outpatient HD, nephro to arrange  Need to arrange ride to HD 3x/weekly from shelter vs snf  CM to assist  - encourage activity, OOB bid  On 5L O2    7/9 s/p HD (7/8) with hypotension, now resovled  - HD per nephrology  Improving BNP    7/10 bp stable, UOP 1250 ml  nephro following, ?diuretics vs ongoing HD  Appreciate input    7/11 on lasix per neprhology  Decreased u.o 400 ml/24 hours  - monitor  HD per neprho    7/12 U.O 1800ml/24 hours  - K5.2, HD per nephrology  On lasix    7/13 u.o. 585/24 hours  - K 5.4, increasing CR 2.85  Nephrology following  On lasix  - now off HD    _ cont on forced diuresis, nephrology following , off dialysis as no need at this time  Nephrology following appreciate rec.     Pneumonia due to infectious organism  Assessment & Plan  Had been treated with 5 days rocephin, 3 days zithromax since admission.  CXR improved edema, no definite infiltrate to suggest pneumonia  Cultures neg  No need for abx     Leukocytosis- (present on admission)  Assessment &  Plan  2/2 steroids.    Acute exacerbation of chronic obstructive pulmonary disease (COPD) (HCC)- (present on admission)  Assessment & Plan  Patient admitted with COPD exacerbation  7/2 tapering solumedrol IV to po prednisone 40 mg daily x 4 days.  7/4:  Sputum culture ordered for productive cough.  7/10: 99 on 5L, taper to baseline needs as tolerated    Wean of O2 as tolerated     DM type 2, uncontrolled, with renal complications (HCC)- (present on admission)  Assessment & Plan  SSI  Diabetic, renal diet.    UTI (urinary tract infection) due to Enterococcus  Assessment & Plan  VRE on urine culture ss zyvox  Increasing wbc daily.  7/6:  Treat zyvox x 7 days.    7/7 ongoing wbc  F/u am labs    7/8 improving wbc  Cont zyvox 7/13    Finished zyvox     Renovascular hypertension  Assessment & Plan  Ordered PRN BP meds for sbp >160 or DBP >90 hydralazine.  Monitor  Avoid BB since she went into cardiogenic shock 2/2 bradycardia.    Chronic hepatitis C without hepatic coma (HCC)- (present on admission)  Assessment & Plan  Will need outpatient treatment.  Elevated LFTs.    Junctional bradycardia- (present on admission)  Assessment & Plan  Combination of hyperkalemia, acute on CKD4 and metoprolol, norvasc  Now resolved s/p HD.    Acute on chronic respiratory failure with hypoxia (HCC)- (present on admission)  Assessment & Plan  Initially admitted to ICU for respiratory support.  No intubation is noted in the record.    Anemia- (present on admission)  Assessment & Plan  2/2 CKD stage 4.    7/8 hgb 6.6, plan for transfusion today  - f/u am cbc, monitor    7/9 s/p 2 u prbc, hgb 9  F/u am labs    7/10 monitor cbc  Significant hypotension noted with anemia and HD, s/p transfusion  hgb 9 to 8, monitor    7/11 hgb 8 to 7.9, monitor  F/u am labs    Hemoglobin 8.1 this AM   Monitor and transfuse if below 7     Pulmonary hypertension (HCC)- (present on admission)  Assessment & Plan  EF 80%  RVP elevated 55-60%.  Likely will need some  diuretic for pulmonary edema.  Currently on HD with 2 L removed on 7/3.      Hyponatremia  Assessment & Plan  7/12 - ARF, on HD per nephrology  - monitor    Tobacco dependence- (present on admission)  Assessment & Plan  Recommend cessation    Chronic use of opiate drug for therapeutic purpose- (present on admission)  Assessment & Plan  On oxycodone 10 mg tid at home, continued in hospital    EDITH (obstructive sleep apnea)- (present on admission)  Assessment & Plan  ongoing    Hypothyroidism- (present on admission)  Assessment & Plan  Continue home meds.    Essential hypertension- (present on admission)  Assessment & Plan  7/9 uncontrolled, with intermittent hypotension with HD   norvasc with holding parameters    Stable monitor and adjust medication as needed         VTE prophylaxis: scd

## 2020-07-17 NOTE — DISCHARGE PLANNING
Anticipated Discharge Disposition: [facility] w/ HH    Action: Patient continues to need diuresis.  O2 is now down to 5lpm via NC.  She is normally on 4 lpm at home.  Per PT/OT, she is very appropriate to return to [facility] w/ HH.  [facility] willing to take her back once discharged, but need updated order and face to face.  [facility] can transport patient back to [facility] as long as it is Monday-Friday and they have a heads up.    Barriers to Discharge: No barriers from a CM standpoint    Plan: CM to follow up for HH order and face to face to be entered and [facility] referral sent.

## 2020-07-17 NOTE — PROGRESS NOTES
Bedside report received. Pt. Lying in bed, denies pain at this time, all other needs are assessed, bed in lowest position, call light within reach. Fall precautions in place, pt. educated call for assistance.

## 2020-07-18 LAB
ALBUMIN SERPL BCP-MCNC: 3.2 G/DL (ref 3.2–4.9)
ALBUMIN/GLOB SERPL: 1.3 G/DL
ALP SERPL-CCNC: 78 U/L (ref 30–99)
ALT SERPL-CCNC: 192 U/L (ref 2–50)
ANION GAP SERPL CALC-SCNC: 12 MMOL/L (ref 7–16)
AST SERPL-CCNC: 111 U/L (ref 12–45)
BILIRUB SERPL-MCNC: 0.5 MG/DL (ref 0.1–1.5)
BUN SERPL-MCNC: 48 MG/DL (ref 8–22)
CALCIUM SERPL-MCNC: 8.6 MG/DL (ref 8.5–10.5)
CHLORIDE SERPL-SCNC: 95 MMOL/L (ref 96–112)
CO2 SERPL-SCNC: 32 MMOL/L (ref 20–33)
CREAT SERPL-MCNC: 2.51 MG/DL (ref 0.5–1.4)
ERYTHROCYTE [DISTWIDTH] IN BLOOD BY AUTOMATED COUNT: 48.5 FL (ref 35.9–50)
GLOBULIN SER CALC-MCNC: 2.4 G/DL (ref 1.9–3.5)
GLUCOSE BLD-MCNC: 114 MG/DL (ref 65–99)
GLUCOSE BLD-MCNC: 124 MG/DL (ref 65–99)
GLUCOSE BLD-MCNC: 234 MG/DL (ref 65–99)
GLUCOSE BLD-MCNC: 94 MG/DL (ref 65–99)
GLUCOSE SERPL-MCNC: 108 MG/DL (ref 65–99)
HCT VFR BLD AUTO: 24.4 % (ref 37–47)
HGB BLD-MCNC: 7.8 G/DL (ref 12–16)
MCH RBC QN AUTO: 29.9 PG (ref 27–33)
MCHC RBC AUTO-ENTMCNC: 32 G/DL (ref 33.6–35)
MCV RBC AUTO: 93.5 FL (ref 81.4–97.8)
PLATELET # BLD AUTO: 156 K/UL (ref 164–446)
PMV BLD AUTO: 8.9 FL (ref 9–12.9)
POTASSIUM SERPL-SCNC: 4.7 MMOL/L (ref 3.6–5.5)
PROT SERPL-MCNC: 5.6 G/DL (ref 6–8.2)
RBC # BLD AUTO: 2.61 M/UL (ref 4.2–5.4)
SODIUM SERPL-SCNC: 139 MMOL/L (ref 135–145)
WBC # BLD AUTO: 7.6 K/UL (ref 4.8–10.8)

## 2020-07-18 PROCEDURE — 700102 HCHG RX REV CODE 250 W/ 637 OVERRIDE(OP): Performed by: HOSPITALIST

## 2020-07-18 PROCEDURE — A9270 NON-COVERED ITEM OR SERVICE: HCPCS | Performed by: INTERNAL MEDICINE

## 2020-07-18 PROCEDURE — 94640 AIRWAY INHALATION TREATMENT: CPT

## 2020-07-18 PROCEDURE — 36415 COLL VENOUS BLD VENIPUNCTURE: CPT

## 2020-07-18 PROCEDURE — 700102 HCHG RX REV CODE 250 W/ 637 OVERRIDE(OP): Performed by: INTERNAL MEDICINE

## 2020-07-18 PROCEDURE — 80053 COMPREHEN METABOLIC PANEL: CPT

## 2020-07-18 PROCEDURE — 82962 GLUCOSE BLOOD TEST: CPT | Mod: 91

## 2020-07-18 PROCEDURE — A9270 NON-COVERED ITEM OR SERVICE: HCPCS | Performed by: HOSPITALIST

## 2020-07-18 PROCEDURE — 770020 HCHG ROOM/CARE - TELE (206)

## 2020-07-18 PROCEDURE — 85027 COMPLETE CBC AUTOMATED: CPT

## 2020-07-18 PROCEDURE — 99232 SBSQ HOSP IP/OBS MODERATE 35: CPT | Performed by: INTERNAL MEDICINE

## 2020-07-18 RX ADMIN — UMECLIDINIUM BROMIDE AND VILANTEROL TRIFENATATE 1 PUFF: 62.5; 25 POWDER RESPIRATORY (INHALATION) at 07:29

## 2020-07-18 RX ADMIN — OXYCODONE HYDROCHLORIDE 10 MG: 10 TABLET ORAL at 06:43

## 2020-07-18 RX ADMIN — ROSUVASTATIN CALCIUM 10 MG: 20 TABLET, FILM COATED ORAL at 17:37

## 2020-07-18 RX ADMIN — OXYCODONE HYDROCHLORIDE 10 MG: 10 TABLET ORAL at 14:23

## 2020-07-18 RX ADMIN — DULOXETINE HYDROCHLORIDE 60 MG: 60 CAPSULE, DELAYED RELEASE ORAL at 06:43

## 2020-07-18 RX ADMIN — FUROSEMIDE 80 MG: 40 TABLET ORAL at 17:37

## 2020-07-18 RX ADMIN — TRAZODONE HYDROCHLORIDE 300 MG: 150 TABLET ORAL at 20:59

## 2020-07-18 RX ADMIN — FLUTICASONE PROPIONATE 88 MCG: 44 AEROSOL, METERED RESPIRATORY (INHALATION) at 07:28

## 2020-07-18 RX ADMIN — LEVOTHYROXINE SODIUM 75 MCG: 75 TABLET ORAL at 06:44

## 2020-07-18 RX ADMIN — TRAZODONE HYDROCHLORIDE 300 MG: 150 TABLET ORAL at 06:43

## 2020-07-18 RX ADMIN — OXYCODONE HYDROCHLORIDE 10 MG: 10 TABLET ORAL at 20:59

## 2020-07-18 RX ADMIN — INSULIN HUMAN 2 UNITS: 100 INJECTION, SOLUTION PARENTERAL at 12:27

## 2020-07-18 RX ADMIN — NYSTATIN: 100000 CREAM TOPICAL at 06:44

## 2020-07-18 RX ADMIN — AMLODIPINE BESYLATE 5 MG: 5 TABLET ORAL at 06:44

## 2020-07-18 RX ADMIN — FUROSEMIDE 80 MG: 40 TABLET ORAL at 06:43

## 2020-07-18 ASSESSMENT — ENCOUNTER SYMPTOMS
CLAUDICATION: 0
MYALGIAS: 0
HEADACHES: 0
DIZZINESS: 0
VOMITING: 0
SORE THROAT: 0
ABDOMINAL PAIN: 0
LOSS OF CONSCIOUSNESS: 0
FEVER: 0
WEAKNESS: 1
COUGH: 0
NERVOUS/ANXIOUS: 0
CHILLS: 0
FOCAL WEAKNESS: 0
NAUSEA: 0
SHORTNESS OF BREATH: 0
NECK PAIN: 0

## 2020-07-18 ASSESSMENT — FIBROSIS 4 INDEX
FIB4 SCORE: 3.59
FIB4 SCORE: 3.59

## 2020-07-18 NOTE — PROGRESS NOTES
Nephrology Daily Progress Note    Date of Service  7/18/2020    Chief Complaint  69 y.o. female with a history of CKD 3, COPD who presented 6/30/2020 with shortness of breath, found to have bradycardia, hypotension, and HAN.    Interval Problem Update  7/1 -patient had dialysis yesterday with no fluid removed, and resolution of shock on completion of dialysis.  Patient had 500 mL of urine output documented last 24 hours.  Patient had dialysis again this morning with 1 L removed.  Patient says her breathing is better.  Denies chest pain, shortness of breath at this time.  7/3 -patient had third session of dialysis yesterday with 2 L removed.  Tolerated well.  Denies chest pain this morning.  Complains of continued shortness of breath, but improved from admission.  7/5 -patient had dialysis yesterday with 1.5 L removed.  Patient says she is urinating, but no urine output being recorded.  Patient says her shortness of breath is still there, but better compared to admission.  Patient denies chest pain.  7/6 -doing better, less SOB  Scheduled for HD TTS  7/9 -doing well, improved SOB  UOP 1500 cc/24 H  Holding dialysis -watching for recovery  7/10 -doing well  Edema improving  SOB better  Good UOP  Holding dialysis  7/11 -doing well, no complaints  SOB and edema improved  Good UOP  Off HD  7/12 -no acute events, no complaints  Good UOP  Creat improving  Off HD  7/13 patient is doing better , still dyspnea on exertion , no chest pain, creatinine is worse.  7/14 patient is complaining of back pain, no chest pain  7/15 patient refused diuretic therapy at this morning  7/16 patient is doing better today, no chest pain no shortness of breath  7/17 patient has no chest pain no shortness of breath  7/18 pt is doing better, still low Po2 when moving  review of Systems  Review of Systems   Constitutional: Negative for chills, fever and malaise/fatigue.   Respiratory: Negative for cough and shortness of breath.    Cardiovascular:  Negative for chest pain and leg swelling.        RAMOS   Gastrointestinal: Negative for nausea and vomiting.   Genitourinary: Negative for dysuria, frequency and urgency.        Physical Exam  Temp:  [36.5 °C (97.7 °F)-37.1 °C (98.7 °F)] 37.1 °C (98.7 °F)  Pulse:  [58-77] 76  Resp:  [16-18] 18  BP: ()/(42-61) 101/59  SpO2:  [91 %-97 %] 91 %    Physical Exam  Vitals signs and nursing note reviewed.   Constitutional:       General: She is not in acute distress.     Appearance: Normal appearance. She is well-developed. She is ill-appearing. She is not diaphoretic.      Interventions: Nasal cannula in place.   HENT:      Head: Normocephalic and atraumatic.      Right Ear: External ear normal.      Left Ear: External ear normal.      Nose: Nose normal.   Eyes:      General: No scleral icterus.        Right eye: No discharge.         Left eye: No discharge.      Conjunctiva/sclera: Conjunctivae normal.   Cardiovascular:      Rate and Rhythm: Normal rate and regular rhythm.      Heart sounds: No murmur.   Pulmonary:      Effort: Pulmonary effort is normal. No respiratory distress.      Breath sounds: Normal breath sounds.   Musculoskeletal:         General: No tenderness.      Right lower leg: Edema present.      Left lower leg: Edema present.   Skin:     General: Skin is warm and dry.      Findings: No erythema.   Neurological:      General: No focal deficit present.      Mental Status: She is alert and oriented to person, place, and time.      Cranial Nerves: No cranial nerve deficit.   Psychiatric:         Mood and Affect: Mood normal.         Behavior: Behavior normal.     Access: Right IJ temporary dialysis catheter.      Fluids    Intake/Output Summary (Last 24 hours) at 7/18/2020 1240  Last data filed at 7/17/2020 2100  Gross per 24 hour   Intake 480 ml   Output 1300 ml   Net -820 ml       Laboratory  Labs reviewed, pertinent labs below.  Recent Labs     07/16/20  0415 07/16/20  1308 07/17/20  0350  07/18/20  0351   WBC 6.7  --  9.0 7.6   RBC 2.02*  --  2.66* 2.61*   HEMOGLOBIN 6.3* 7.8* 8.1* 7.8*   HEMATOCRIT 20.0* 24.4* 25.2* 24.4*   MCV 99.0*  --  94.7 93.5   MCH 31.2  --  30.5 29.9   MCHC 31.5*  --  32.1* 32.0*   RDW 50.8*  --  50.5* 48.5   PLATELETCT 104*  --  130* 156*   MPV 8.8*  --  8.7* 8.9*     Recent Labs     07/16/20  0415 07/17/20  0350 07/18/20  0351   SODIUM 136 137 139   POTASSIUM 4.5 4.5 4.7   CHLORIDE 97 96 95*   CO2 29 30 32   GLUCOSE 103* 107* 108*   BUN 51* 50* 48*   CREATININE 2.81* 2.52* 2.51*   CALCIUM 8.5 8.6 8.6               URINALYSIS:  Lab Results   Component Value Date/Time    COLORURINE DK Yellow 06/15/2019 0245    CLARITY Turbid (A) 06/15/2019 0245    SPECGRAVITY 1.020 06/15/2019 0245    PHURINE 5.0 06/15/2019 0245    KETONES Trace (A) 06/15/2019 0245    PROTEINURIN 300 (A) 06/15/2019 0245    BILIRUBINUR Negative 06/15/2019 0245    UROBILU 1.0 06/15/2019 0245    NITRITE Negative 06/15/2019 0245    LEUKESTERAS Negative 06/15/2019 0245    OCCULTBLOOD Negative 06/15/2019 0245     UPC  No results found for: TOTPROTUR No results found for: CREATININEU      Imaging reviewed  DX-CHEST-PORTABLE (1 VIEW)   Final Result         No significant interval change.      Unchanged diffuse interstitial prominence.      EC-ECHOCARDIOGRAM COMPLETE W/O CONT   Final Result      US-RUQ   Final Result      Cholelithiasis with mild gallbladder wall thickening but no positive sonographic Troncoso sign. This could indicate chronic cholecystitis or incomplete distention      DX-CHEST-PORTABLE (1 VIEW)   Final Result      1.  Satisfactory position of new right IJV multilumen dialysis catheter. No pneumothorax identified.      2.  Unchanged mild volume overload pattern.      DX-CHEST-PORTABLE (1 VIEW)   Final Result      No acute cardiac or pulmonary abnormality is noted. Stable cardiomegaly with increased interstitial opacity diffusely.            Current Facility-Administered Medications   Medication Dose  Route Frequency Provider Last Rate Last Dose   • nystatin (MYCOSTATIN) cream   Topical BID MARLO Oliveira       • furosemide (LASIX) tablet 80 mg  80 mg Oral BID DIURETIC Fadi Najjar, M.D.   80 mg at 07/18/20 0643   • fluticasone (FLOVENT HFA) 44 MCG/ACT inhaler 88 mcg  2 Puff Inhalation QDAILY (RT) Roger Florence Jr., D.O.   88 mcg at 07/18/20 0728   • amLODIPine (NORVASC) tablet 5 mg  5 mg Oral Q DAY GHULAM DimasOMelonie   5 mg at 07/18/20 0644   • ipratropium-albuterol (DUONEB) nebulizer solution  3 mL Nebulization Q2HRS PRN (RT) Christina Calderon M.D.       • hydrALAZINE (APRESOLINE) tablet 25 mg  25 mg Oral Q8HRS PRN Christina Calderon M.D.   25 mg at 07/10/20 1647   • oxyCODONE immediate release (ROXICODONE) tablet 10 mg  10 mg Oral TID PRN Christina Calderon M.D.   10 mg at 07/18/20 0643   • lactulose 20 GM/30ML solution 30 mL  30 mL Oral BID W/MEALS PRN Heber Armstrong M.D.       • NS (BOLUS) infusion 250 mL  250 mL Intravenous DIALYSIS PRN Eric Pompa M.D.       • Respiratory Therapy Consult   Nebulization Continuous RT Roger Florence Jr., D.O.       • DULoxetine (CYMBALTA) capsule 60 mg  60 mg Oral DAILY Roger Florence Jr., D.O.   60 mg at 07/18/20 0643   • hydrOXYzine HCl (ATARAX) tablet 25 mg  25 mg Oral TID PRN Roger Florence Jr., D.O.   25 mg at 07/10/20 1642   • levothyroxine (SYNTHROID) tablet 75 mcg  75 mcg Oral AM ES Roger Florence Jr., D.O.   75 mcg at 07/18/20 0644   • lidocaine (LIDODERM) 5 % 1 Patch  1 Patch Transdermal Q24HRS PRN Roger Florence Jr., D.O.   1 Patch at 07/04/20 2211   • traZODone (DESYREL) tablet 300 mg  300 mg Oral BID Roger Florence Jr., D.O.   300 mg at 07/18/20 0643   • rosuvastatin (CRESTOR) tablet 10 mg  10 mg Oral Q EVENING Roger Florence Jr., D.O.   10 mg at 07/17/20 1652   • senna-docusate (PERICOLACE or SENOKOT S) 8.6-50 MG per tablet 2 Tab  2 Tab Oral BID Roger Florence Jr., D.O.   2 Tab at 07/16/20 0601    And   • polyethylene glycol/lytes (MIRALAX) PACKET 1  Packet  1 Packet Oral QDAY PRN ROBY Sarmiento Jr..O.   1 Packet at 07/02/20 0500    And   • magnesium hydroxide (MILK OF MAGNESIA) suspension 30 mL  30 mL Oral QDAY PRN Roger Florence Jr., D.O.        And   • bisacodyl (DULCOLAX) suppository 10 mg  10 mg Rectal QDAY PRN ROBY Sarmiento Jr..O.       • ondansetron (ZOFRAN) syringe/vial injection 4 mg  4 mg Intravenous Q4HRS PRN ROBY Sarmiento Jr..O.       • ondansetron (ZOFRAN ODT) dispertab 4 mg  4 mg Oral Q4HRS PRN ROBY Sarmineto Jr..O.       • Pharmacy Consult Request ...Pain Management Review 1 Each  1 Each Other PHARMACY TO DOSE ROBY Sarmiento Jr..LANCE.       • lactated ringers infusion (BOLUS)  500 mL Intravenous Once PRN Roger Florence Jr., D.O.       • umeclidinium-vilanterol (ANORO ELLIPTA) inhaler 1 Puff  1 Puff Inhalation QDAILY (RT) ROBY Sarmiento Jr..O.   1 Puff at 07/18/20 0729   • insulin regular (HUMULIN R) injection 1-6 Units  1-6 Units Subcutaneous 4X/DAY ACHS Bob Glover M.D.   2 Units at 07/18/20 1227    And   • glucose 4 g chewable tablet 16 g  16 g Oral Q15 MIN PRN Bob Glover M.D.        And   • dextrose 50% (D50W) injection 50 mL  50 mL Intravenous Q15 MIN PRN Bob Glover M.D.             Assessment/Plan  69 y.o. female with a history of CKD 3, COPD who presented 6/30/2020 with shortness of breath, found to have bradycardia, hypotension, and HAN.     1.  HAN on CKD stage III : stable  2.  HTN: OK  3.  Volume overload: Improving  4.  Hyponatremia: Better  5.  Anemia: stable   6.  Cardiogenic shock.   7   Hyperkalemia: Improved  Recs:   no need for HD   continue Lasix  Renal diet  Daily labs  Renal dose all meds  Avoid nephrotoxins  Prognosis guarded.

## 2020-07-18 NOTE — CARE PLAN
Problem: Safety  Goal: Will remain free from injury  Outcome: PROGRESSING AS EXPECTED  Goal: Will remain free from falls  Outcome: PROGRESSING AS EXPECTED     Problem: Infection  Goal: Will remain free from infection  Outcome: PROGRESSING AS EXPECTED     Problem: Venous Thromboembolism (VTW)/Deep Vein Thrombosis (DVT) Prevention:  Goal: Patient will participate in Venous Thrombosis (VTE)/Deep Vein Thrombosis (DVT)Prevention Measures  Outcome: PROGRESSING AS EXPECTED     Problem: Bowel/Gastric:  Goal: Normal bowel function is maintained or improved  Outcome: PROGRESSING AS EXPECTED  Goal: Will not experience complications related to bowel motility  Outcome: PROGRESSING AS EXPECTED     Problem: Knowledge Deficit  Goal: Knowledge of disease process/condition, treatment plan, diagnostic tests, and medications will improve  Outcome: PROGRESSING AS EXPECTED  Goal: Knowledge of the prescribed therapeutic regimen will improve  Outcome: PROGRESSING AS EXPECTED     Problem: Discharge Barriers/Planning  Goal: Patient's continuum of care needs will be met  Outcome: PROGRESSING AS EXPECTED     Problem: Pain Management  Goal: Pain level will decrease to patient's comfort goal  Outcome: PROGRESSING AS EXPECTED     Problem: Respiratory:  Goal: Respiratory status will improve  Outcome: PROGRESSING AS EXPECTED     Problem: Fluid Volume:  Goal: Will maintain balanced intake and output  Outcome: PROGRESSING AS EXPECTED     Problem: Psychosocial Needs:  Goal: Level of anxiety will decrease  Outcome: PROGRESSING AS EXPECTED     Problem: Mobility  Goal: Risk for activity intolerance will decrease  Outcome: PROGRESSING AS EXPECTED     Problem: Skin Integrity  Goal: Risk for impaired skin integrity will decrease  Outcome: PROGRESSING AS EXPECTED     Problem: Urinary Elimination:  Goal: Ability to reestablish a normal urinary elimination pattern will improve  Outcome: PROGRESSING AS EXPECTED

## 2020-07-18 NOTE — PROGRESS NOTES
Hospital Medicine Daily Progress Note    Date of Service  7/18/2020    Chief Complaint  69 y.o. female admitted 6/30/2020 with junctional bradycardia rate 37 with cardiogenic shock, SOB.    Hospital Course    7/2:  This 68 yo female admitted by critical care to ICU for HR 37 junctional rhythm, COPD on 4 LPM NC at home, DM, hypothyroidism, hepatitis C, HTN, CHF, CKD stage 4 followed by Dr. Nava presented with worsening SOB, hypoxia and cough.  She was recently admitted 6/15/20 for COPD exacerbation and pulmonary edema, given amlodopine, metoprolol and lasix.   COVID 19 negative x3.  K was elevated 6.3.  Nephrology consulted and started HD with improvement of HR, BP and SOB.  3 HD sessions performed including today with 2 L removed.  She is feeling better, but still swollen in extremities, on 4 LPM NC baseline O2, ready for dc out of ICU.  Her BP is currently controlled at 97/69, no BP meds given at this point.  K normalized and Cr imporved.  Wbc increased to 23K on IV steroids.  I have started oral taper from solumedrol 60 IV q 12 to 40mg po daily.  It is unclear why patient was started on Rocephin on admission.  I have ordered a urine culture, no UA on admission or culture.  No evidence for pneumonia.  7/3:  Patient still with orthopnea, but no wheeze, wet cough noted today.  No HD today, waiting until tomorrow.  Has temporary triple lumen RIJ.  Wbc decreasing, Cr improving 3.24 to 2.58.  EF 80% but RVP elevated 55-60 with moderate mitral regurg noted. SR 76-92.  7/4:  Remains NSR on monitor.  No SOB, had 1.5 L removed during HD today.  Cr improving, urine output improving daily.  States having productive sputum thick brown, ordered sputum culture.  Lungs CTA b/l.  7/5:  No change, continues on baseline O2 4 LPM NC, no wheeze or crackles.  7/6:  C/o productive sputum, had 5 days of Rocephin, zithromax completed.  Ordered sputum culture, sent today with thick brown.  Wbc increased last 2 days, repeat CXR no  infiltrate, improved pulmonary edema since 6/30 CXR.  + VRE on urine culture, started zyvox bid x 7d ays.*    7/7: Patient reports making 250 mL urine output  Denies any dysuria  Plan for hemodialysis today  On Zyvox  She reports generalized weakness, will likely need placement, from assisted living facility  7/8 we will monitor ins and outs closely  Discussed with nephrology, Dr. Mccabe  Denies dysuria  Generalized weakness, requiring 1 person assist  Encourage activity  Discussed with RN  Baseline oxygen needs of 4 L, will taper as tolerated  Anemia requiring transfusion, will monitor closely  Reports lethargy today  7/9 no new complaints  Patient reports urine output  Per nephrology, will start trial of Lasix  Hemodialysis per nephrology  7/10 improving shortness of breath  Improving urinary output of greater than 1200 mL's  Dialysis per nephrology  Encourage activity  7/11 SBA per staff  Poor urine output, 400 ml/24 hours  7/12  Improved urine output  1800ml/24 hours  -deconditioned, snf referral  Encourage IS use  Improved urine output, for HD catheter removal per nephro  7/13 poor urine output overnight, 585/20 4 hours  On Lasix  Potassium of 5.4  7/14: Pt seen and examined no acute events overnight, feels SOB, denies any chest pain. No nausea or vomiting.  Nephrology following, no need for dialysis anymore  Appreciate rec.   7/15Pt resting in bed still with SOB, refused lasix this AM, discussed with pt regarding it and now agrees to take it.  Afebrile, potassium 5.7 this AM, will give a dose of kayexalate  Nephrology also following appreciate rec    7/16: Pt seen and examined, hemoglobin 6.2 this AM, received 1 unit of RBC now hemoglobin 7.8.  Still SOB c cont on aggressive diuresis   Nephrology following appreciate rec.   Hyperkalemia improving   7/17: No acute events overnight, afebrile, hemoglobin better this AM.  Cont on aggressive diuresis,   Hyperkalemia improved  Nephology following appreciate rec.    7/18: Pt seen and examined, afebrile, no nausea or vomiting. Eating breakfast.  SOB slowly improving     Consultants/Specialty  Nephrology   Critical care    Code Status  DNAR, I ok    Disposition   getting HD per nephrology   Hep C +, no treatment yet, patient understands the benefits of treatment and will need to pursue outpatient tx.  Resides at [facility].  OT/PT no needs.  Baseline O2 5 LPM NC.    snf referral, CM to assist  Zyvox through July 13        Review of Systems  Review of Systems   Constitutional: Negative for chills and fever.   HENT: Negative for sore throat.    Respiratory: Negative for cough and shortness of breath.    Cardiovascular: Positive for leg swelling. Negative for chest pain and claudication.   Gastrointestinal: Negative for abdominal pain.   Genitourinary: Negative for dysuria.   Musculoskeletal: Negative for myalgias and neck pain.   Neurological: Positive for weakness. Negative for dizziness, focal weakness, loss of consciousness and headaches.   Psychiatric/Behavioral: The patient is not nervous/anxious.         Physical Exam  Temp:  [36.5 °C (97.7 °F)-37.1 °C (98.7 °F)] 37.1 °C (98.7 °F)  Pulse:  [58-77] 76  Resp:  [16-18] 18  BP: ()/(42-61) 101/59  SpO2:  [91 %-97 %] 91 %    Physical Exam  Vitals signs and nursing note reviewed.   Constitutional:       General: She is not in acute distress.     Appearance: She is well-developed. She is not ill-appearing or toxic-appearing.   HENT:      Head: Normocephalic and atraumatic.      Nose: Nose normal.      Mouth/Throat:      Pharynx: No oropharyngeal exudate.   Eyes:      Extraocular Movements: Extraocular movements intact.      Pupils: Pupils are equal, round, and reactive to light.   Neck:      Musculoskeletal: Normal range of motion and neck supple.      Thyroid: No thyromegaly.      Vascular: No JVD.      Trachea: No tracheal deviation.   Cardiovascular:      Rate and Rhythm: Normal rate.      Pulses: Normal pulses.   Pulmonary:       Effort: Pulmonary effort is normal. No respiratory distress.      Breath sounds: Normal breath sounds. No wheezing.   Abdominal:      General: Bowel sounds are normal. There is no distension.      Palpations: Abdomen is soft.   Musculoskeletal: Normal range of motion.         General: Swelling present. No tenderness.   Skin:     General: Skin is warm and dry.      Coloration: Skin is not pale.   Neurological:      Mental Status: She is alert and oriented to person, place, and time.      Cranial Nerves: No cranial nerve deficit.      Motor: Weakness present. No abnormal muscle tone.   Psychiatric:         Thought Content: Thought content normal.         Fluids    Intake/Output Summary (Last 24 hours) at 7/18/2020 1122  Last data filed at 7/17/2020 2100  Gross per 24 hour   Intake 480 ml   Output 1300 ml   Net -820 ml       Laboratory  Recent Labs     07/16/20 0415 07/16/20  1308 07/17/20  0350 07/18/20  0351   WBC 6.7  --  9.0 7.6   RBC 2.02*  --  2.66* 2.61*   HEMOGLOBIN 6.3* 7.8* 8.1* 7.8*   HEMATOCRIT 20.0* 24.4* 25.2* 24.4*   MCV 99.0*  --  94.7 93.5   MCH 31.2  --  30.5 29.9   MCHC 31.5*  --  32.1* 32.0*   RDW 50.8*  --  50.5* 48.5   PLATELETCT 104*  --  130* 156*   MPV 8.8*  --  8.7* 8.9*     Recent Labs     07/16/20 0415 07/17/20  0350 07/18/20  0351   SODIUM 136 137 139   POTASSIUM 4.5 4.5 4.7   CHLORIDE 97 96 95*   CO2 29 30 32   GLUCOSE 103* 107* 108*   BUN 51* 50* 48*   CREATININE 2.81* 2.52* 2.51*   CALCIUM 8.5 8.6 8.6                   Imaging  DX-CHEST-PORTABLE (1 VIEW)   Final Result         No significant interval change.      Unchanged diffuse interstitial prominence.      EC-ECHOCARDIOGRAM COMPLETE W/O CONT   Final Result      US-RUQ   Final Result      Cholelithiasis with mild gallbladder wall thickening but no positive sonographic Troncoso sign. This could indicate chronic cholecystitis or incomplete distention      DX-CHEST-PORTABLE (1 VIEW)   Final Result      1.  Satisfactory position of new  right IJV multilumen dialysis catheter. No pneumothorax identified.      2.  Unchanged mild volume overload pattern.      DX-CHEST-PORTABLE (1 VIEW)   Final Result      No acute cardiac or pulmonary abnormality is noted. Stable cardiomegaly with increased interstitial opacity diffusely.           Assessment/Plan  Hyperkalemia- (present on admission)  Assessment & Plan  Cont on lasix   Improved   Monitor       Acute renal failure superimposed on stage 4 chronic kidney disease (HCC)- (present on admission)  Assessment & Plan  Had recent admission, given lasix at discharge and new BP meds, metoprolol and norvasc.  HR 37 and low BP on admission.  Had HD emergently x 3 with improvement of SOB, 1 L off yesterday, 2 L off today.  Dr. Pompa following for HD ongoing determination, for now plans on TTS schedule.  Will need temporary HD catheter placed prior to dc and HD chair set up.  Patient states she gets better, then goes back to SOB for several months off and on.       7/7 HD per nephro, monitoring for renal recovery  7/8  S/p HD, ?outpatient HD, nephro to arrange  Need to arrange ride to HD 3x/weekly from [facility] vs snf  CM to assist  - encourage activity, OOB bid  On 5L O2    7/9 s/p HD (7/8) with hypotension, now resovled  - HD per nephrology  Improving BNP    7/10 bp stable, UOP 1250 ml  nephro following, ?diuretics vs ongoing HD  Appreciate input    7/11 on lasix per neprhology  Decreased u.o 400 ml/24 hours  - monitor  HD per neprho    7/12 U.O 1800ml/24 hours  - K5.2, HD per nephrology  On lasix    7/13 u.o. 585/24 hours  - K 5.4, increasing CR 2.85  Nephrology following  On lasix  - now off HD    _ cont on forced diuresis, nephrology following , off dialysis as no need at this time  Nephrology following appreciate rec.     Pneumonia due to infectious organism  Assessment & Plan  Had been treated with 5 days rocephin, 3 days zithromax since admission.  CXR improved edema, no definite infiltrate to suggest  pneumonia  Cultures neg  No need for abx     Leukocytosis- (present on admission)  Assessment & Plan  2/2 steroids.    Acute exacerbation of chronic obstructive pulmonary disease (COPD) (HCC)- (present on admission)  Assessment & Plan  Patient admitted with COPD exacerbation  7/2 tapering solumedrol IV to po prednisone 40 mg daily x 4 days.  7/4:  Sputum culture ordered for productive cough.  7/10: 99 on 5L, taper to baseline needs as tolerated    Wean of O2 as tolerated     DM type 2, uncontrolled, with renal complications (HCC)- (present on admission)  Assessment & Plan  SSI  Diabetic, renal diet.    UTI (urinary tract infection) due to Enterococcus  Assessment & Plan  VRE on urine culture ss zyvox  Increasing wbc daily.  7/6:  Treat zyvox x 7 days.    7/7 ongoing wbc  F/u am labs    7/8 improving wbc  Cont zyvox 7/13    Finished zyvox     Renovascular hypertension  Assessment & Plan  Ordered PRN BP meds for sbp >160 or DBP >90 hydralazine.  Monitor  Avoid BB since she went into cardiogenic shock 2/2 bradycardia.    Chronic hepatitis C without hepatic coma (HCC)- (present on admission)  Assessment & Plan  Will need outpatient treatment.  Elevated LFTs.    Junctional bradycardia- (present on admission)  Assessment & Plan  Combination of hyperkalemia, acute on CKD4 and metoprolol, norvasc  Now resolved s/p HD.    Acute on chronic respiratory failure with hypoxia (HCC)- (present on admission)  Assessment & Plan  Initially admitted to ICU for respiratory support.  No intubation is noted in the record.    Anemia- (present on admission)  Assessment & Plan  2/2 CKD stage 4.    7/8 hgb 6.6, plan for transfusion today  - f/u am cbc, monitor    7/9 s/p 2 u prbc, hgb 9  F/u am labs    7/10 monitor cbc  Significant hypotension noted with anemia and HD, s/p transfusion  hgb 9 to 8, monitor    7/11 hgb 8 to 7.9, monitor  F/u am labs    Hemoglobin 8.1 this AM   Monitor and transfuse if below 7     Pulmonary hypertension (HCC)-  (present on admission)  Assessment & Plan  EF 80%  RVP elevated 55-60%.  Likely will need some diuretic for pulmonary edema.  Currently on HD with 2 L removed on 7/3.      Hyponatremia  Assessment & Plan  7/12 - ARF, on HD per nephrology  - monitor    Tobacco dependence- (present on admission)  Assessment & Plan  Recommend cessation    Chronic use of opiate drug for therapeutic purpose- (present on admission)  Assessment & Plan  On oxycodone 10 mg tid at home, continued in hospital    EDITH (obstructive sleep apnea)- (present on admission)  Assessment & Plan  ongoing    Hypothyroidism- (present on admission)  Assessment & Plan  Continue home meds.    Essential hypertension- (present on admission)  Assessment & Plan  7/9 uncontrolled, with intermittent hypotension with HD   norvasc with holding parameters    Stable monitor and adjust medication as needed         VTE prophylaxis: scd

## 2020-07-18 NOTE — CARE PLAN
Problem: Safety  Goal: Will remain free from injury  Outcome: PROGRESSING AS EXPECTED  Note: Patient's room close to nursing station, bed in the lowest position, call light within reach, hourly rounding on the pt.       Problem: Infection  Goal: Will remain free from infection  Outcome: PROGRESSING AS EXPECTED  Note: Contact precautions in place and implemented with pt. care and frequent handwashing in between care.      Problem: Respiratory:  Goal: Respiratory status will improve  Note: Patient on continuous pulse oximetry. Patient's work of breathing and shortness of breath assessed and addressed as appropriate. Patient's head of bed elevated.

## 2020-07-18 NOTE — PROGRESS NOTES
Bedside report received. Pt. Lying in bed, reports pain 6/10, resting for pain intervention, all other needs are assessed, bed in lowest position, call light within reach. Fall precautions in place, pt. educated call for assistance.

## 2020-07-18 NOTE — FACE TO FACE
Face to Face Supporting Documentation - Home Health    The encounter with this patient was in whole or in part the primary reason for home health admission.    Date of encounter:   Patient:                    MRN:                       YOB: 2021  Priya Callahan  [id number]  1950     Home health to see patient for:  Physical Therapy evaluation and treatment and Occupational therapy evaluation and treatment    Homebound status evidenced by:  Need the aid of supportive devices such as crutches, canes, wheelchairs or walkers. Leaving home requires a considerable and taxing effort. There is a normal inability to leave the home.    Community Physician to provide follow up care: Crystal Ramos M.D.     Optional Interventions? No      I certify the face to face encounter for this home health care referral meets the CMS requirements and the encounter/clinical assessment with the patient was, in whole, or in part, for the medical condition(s) listed above, which is the primary reason for home health care. Based on my clinical findings: the service(s) are medically necessary, support the need for home health care, and the homebound criteria are met.  I certify that this patient has had a face to face encounter by myself.  Lora Dewey M.D. - NPI: [id number]

## 2020-07-18 NOTE — PROGRESS NOTES
MD was notified about the low blood pressure, pt is not symptomatic. No new orders were given by the MD at this time.

## 2020-07-19 LAB
ALBUMIN SERPL BCP-MCNC: 2.8 G/DL (ref 3.2–4.9)
ALBUMIN/GLOB SERPL: 1 G/DL
ALP SERPL-CCNC: 72 U/L (ref 30–99)
ALT SERPL-CCNC: 154 U/L (ref 2–50)
ANION GAP SERPL CALC-SCNC: 12 MMOL/L (ref 7–16)
AST SERPL-CCNC: 81 U/L (ref 12–45)
BILIRUB SERPL-MCNC: 0.4 MG/DL (ref 0.1–1.5)
BUN SERPL-MCNC: 49 MG/DL (ref 8–22)
CALCIUM SERPL-MCNC: 8.6 MG/DL (ref 8.5–10.5)
CHLORIDE SERPL-SCNC: 93 MMOL/L (ref 96–112)
CO2 SERPL-SCNC: 32 MMOL/L (ref 20–33)
CREAT SERPL-MCNC: 2.66 MG/DL (ref 0.5–1.4)
ERYTHROCYTE [DISTWIDTH] IN BLOOD BY AUTOMATED COUNT: 48.6 FL (ref 35.9–50)
GLOBULIN SER CALC-MCNC: 2.7 G/DL (ref 1.9–3.5)
GLUCOSE BLD-MCNC: 102 MG/DL (ref 65–99)
GLUCOSE BLD-MCNC: 131 MG/DL (ref 65–99)
GLUCOSE BLD-MCNC: 150 MG/DL (ref 65–99)
GLUCOSE BLD-MCNC: 163 MG/DL (ref 65–99)
GLUCOSE SERPL-MCNC: 112 MG/DL (ref 65–99)
HCT VFR BLD AUTO: 23.3 % (ref 37–47)
HGB BLD-MCNC: 7.3 G/DL (ref 12–16)
MCH RBC QN AUTO: 29.9 PG (ref 27–33)
MCHC RBC AUTO-ENTMCNC: 31.3 G/DL (ref 33.6–35)
MCV RBC AUTO: 95.5 FL (ref 81.4–97.8)
PLATELET # BLD AUTO: 141 K/UL (ref 164–446)
PMV BLD AUTO: 9 FL (ref 9–12.9)
POTASSIUM SERPL-SCNC: 4.1 MMOL/L (ref 3.6–5.5)
PROT SERPL-MCNC: 5.5 G/DL (ref 6–8.2)
RBC # BLD AUTO: 2.44 M/UL (ref 4.2–5.4)
SODIUM SERPL-SCNC: 137 MMOL/L (ref 135–145)
WBC # BLD AUTO: 6.1 K/UL (ref 4.8–10.8)

## 2020-07-19 PROCEDURE — 700102 HCHG RX REV CODE 250 W/ 637 OVERRIDE(OP): Performed by: INTERNAL MEDICINE

## 2020-07-19 PROCEDURE — A9270 NON-COVERED ITEM OR SERVICE: HCPCS | Performed by: HOSPITALIST

## 2020-07-19 PROCEDURE — 99232 SBSQ HOSP IP/OBS MODERATE 35: CPT | Performed by: INTERNAL MEDICINE

## 2020-07-19 PROCEDURE — 80053 COMPREHEN METABOLIC PANEL: CPT

## 2020-07-19 PROCEDURE — 36415 COLL VENOUS BLD VENIPUNCTURE: CPT

## 2020-07-19 PROCEDURE — 770020 HCHG ROOM/CARE - TELE (206)

## 2020-07-19 PROCEDURE — 85027 COMPLETE CBC AUTOMATED: CPT

## 2020-07-19 PROCEDURE — 82962 GLUCOSE BLOOD TEST: CPT | Mod: 91

## 2020-07-19 PROCEDURE — 700102 HCHG RX REV CODE 250 W/ 637 OVERRIDE(OP): Performed by: HOSPITALIST

## 2020-07-19 PROCEDURE — A9270 NON-COVERED ITEM OR SERVICE: HCPCS | Performed by: INTERNAL MEDICINE

## 2020-07-19 PROCEDURE — 94760 N-INVAS EAR/PLS OXIMETRY 1: CPT

## 2020-07-19 PROCEDURE — 94640 AIRWAY INHALATION TREATMENT: CPT

## 2020-07-19 RX ADMIN — OXYCODONE HYDROCHLORIDE 10 MG: 10 TABLET ORAL at 05:41

## 2020-07-19 RX ADMIN — FUROSEMIDE 80 MG: 40 TABLET ORAL at 18:04

## 2020-07-19 RX ADMIN — UMECLIDINIUM BROMIDE AND VILANTEROL TRIFENATATE 1 PUFF: 62.5; 25 POWDER RESPIRATORY (INHALATION) at 06:50

## 2020-07-19 RX ADMIN — FUROSEMIDE 80 MG: 40 TABLET ORAL at 05:33

## 2020-07-19 RX ADMIN — INSULIN HUMAN 1 UNITS: 100 INJECTION, SOLUTION PARENTERAL at 20:06

## 2020-07-19 RX ADMIN — FLUTICASONE PROPIONATE 88 MCG: 44 AEROSOL, METERED RESPIRATORY (INHALATION) at 06:50

## 2020-07-19 RX ADMIN — LEVOTHYROXINE SODIUM 75 MCG: 75 TABLET ORAL at 05:34

## 2020-07-19 RX ADMIN — OXYCODONE HYDROCHLORIDE 10 MG: 10 TABLET ORAL at 19:39

## 2020-07-19 RX ADMIN — TRAZODONE HYDROCHLORIDE 300 MG: 150 TABLET ORAL at 05:34

## 2020-07-19 RX ADMIN — AMLODIPINE BESYLATE 5 MG: 5 TABLET ORAL at 05:34

## 2020-07-19 RX ADMIN — NYSTATIN: 100000 CREAM TOPICAL at 06:00

## 2020-07-19 RX ADMIN — OXYCODONE HYDROCHLORIDE 10 MG: 10 TABLET ORAL at 12:29

## 2020-07-19 RX ADMIN — DULOXETINE HYDROCHLORIDE 60 MG: 60 CAPSULE, DELAYED RELEASE ORAL at 05:34

## 2020-07-19 RX ADMIN — TRAZODONE HYDROCHLORIDE 300 MG: 150 TABLET ORAL at 20:08

## 2020-07-19 RX ADMIN — ROSUVASTATIN CALCIUM 10 MG: 20 TABLET, FILM COATED ORAL at 18:05

## 2020-07-19 ASSESSMENT — ENCOUNTER SYMPTOMS
SHORTNESS OF BREATH: 1
CLAUDICATION: 0
ABDOMINAL PAIN: 0
LOSS OF CONSCIOUSNESS: 0
VOMITING: 0
NERVOUS/ANXIOUS: 0
COUGH: 0
NAUSEA: 0
MYALGIAS: 0
SHORTNESS OF BREATH: 0
CHILLS: 0
DIZZINESS: 0
HEADACHES: 0
FEVER: 0
SORE THROAT: 0
WEAKNESS: 1
NECK PAIN: 0
FOCAL WEAKNESS: 0

## 2020-07-19 NOTE — PROGRESS NOTES
Report received from day shift RN, assumed care of pt. Pt A&O4, in no apparent distress, reports 0 pain. Plan of care discussed with pt, labs and chart reviewed. All needs met at this time. Tele box on, pt in bed. Call light within reach, bed locked and in lowest position. All fall precautions and hourly rounding in place. Will continue to monitor.

## 2020-07-19 NOTE — PROGRESS NOTES
Nephrology Daily Progress Note    Date of Service  7/19/2020    Chief Complaint  69 y.o. female with a history of CKD 3, COPD who presented 6/30/2020 with shortness of breath, found to have bradycardia, hypotension, and HAN.    Interval Problem Update  7/1 -patient had dialysis yesterday with no fluid removed, and resolution of shock on completion of dialysis.  Patient had 500 mL of urine output documented last 24 hours.  Patient had dialysis again this morning with 1 L removed.  Patient says her breathing is better.  Denies chest pain, shortness of breath at this time.  7/3 -patient had third session of dialysis yesterday with 2 L removed.  Tolerated well.  Denies chest pain this morning.  Complains of continued shortness of breath, but improved from admission.  7/5 -patient had dialysis yesterday with 1.5 L removed.  Patient says she is urinating, but no urine output being recorded.  Patient says her shortness of breath is still there, but better compared to admission.  Patient denies chest pain.  7/6 -doing better, less SOB  Scheduled for HD TTS  7/9 -doing well, improved SOB  UOP 1500 cc/24 H  Holding dialysis -watching for recovery  7/10 -doing well  Edema improving  SOB better  Good UOP  Holding dialysis  7/11 -doing well, no complaints  SOB and edema improved  Good UOP  Off HD  7/12 -no acute events, no complaints  Good UOP  Creat improving  Off HD  7/13 patient is doing better , still dyspnea on exertion , no chest pain, creatinine is worse.  7/14 patient is complaining of back pain, no chest pain  7/15 patient refused diuretic therapy at this morning  7/16 patient is doing better today, no chest pain no shortness of breath  7/17 patient has no chest pain no shortness of breath  7/18 pt is doing better, still low Po2 when moving  7/19 pt is doing about the same  review of Systems  Review of Systems   Constitutional: Negative for chills, fever and malaise/fatigue.   Respiratory: Positive for shortness of  breath. Negative for cough.    Cardiovascular: Negative for chest pain and leg swelling.   Gastrointestinal: Negative for nausea and vomiting.   Genitourinary: Negative for dysuria, frequency and urgency.        Physical Exam  Temp:  [36.4 °C (97.6 °F)-37 °C (98.6 °F)] 36.4 °C (97.6 °F)  Pulse:  [55-65] 55  Resp:  [17-18] 18  BP: (113-135)/(34-98) 127/41  SpO2:  [92 %-99 %] 95 %    Physical Exam  Vitals signs and nursing note reviewed.   Constitutional:       General: She is not in acute distress.     Appearance: Normal appearance. She is well-developed. She is not diaphoretic.   HENT:      Head: Normocephalic and atraumatic.      Right Ear: External ear normal.      Left Ear: External ear normal.      Nose: Nose normal.   Eyes:      General: No scleral icterus.        Right eye: No discharge.         Left eye: No discharge.      Conjunctiva/sclera: Conjunctivae normal.   Cardiovascular:      Rate and Rhythm: Normal rate and regular rhythm.      Heart sounds: No murmur.   Pulmonary:      Effort: Pulmonary effort is normal. No respiratory distress.      Breath sounds: Normal breath sounds.   Musculoskeletal:         General: No tenderness.      Right lower leg: Edema present.      Left lower leg: Edema present.   Skin:     General: Skin is warm and dry.      Findings: No erythema.   Neurological:      General: No focal deficit present.      Mental Status: She is alert and oriented to person, place, and time.      Cranial Nerves: No cranial nerve deficit.   Psychiatric:         Mood and Affect: Mood normal.         Behavior: Behavior normal.     Access: Right IJ temporary dialysis catheter.      Fluids    Intake/Output Summary (Last 24 hours) at 7/19/2020 1327  Last data filed at 7/19/2020 1102  Gross per 24 hour   Intake 960 ml   Output 1500 ml   Net -540 ml       Laboratory  Labs reviewed, pertinent labs below.  Recent Labs     07/17/20  0350 07/18/20  0351 07/19/20  0333   WBC 9.0 7.6 6.1   RBC 2.66* 2.61* 2.44*    HEMOGLOBIN 8.1* 7.8* 7.3*   HEMATOCRIT 25.2* 24.4* 23.3*   MCV 94.7 93.5 95.5   MCH 30.5 29.9 29.9   MCHC 32.1* 32.0* 31.3*   RDW 50.5* 48.5 48.6   PLATELETCT 130* 156* 141*   MPV 8.7* 8.9* 9.0     Recent Labs     07/17/20  0350 07/18/20  0351 07/19/20  0333   SODIUM 137 139 137   POTASSIUM 4.5 4.7 4.1   CHLORIDE 96 95* 93*   CO2 30 32 32   GLUCOSE 107* 108* 112*   BUN 50* 48* 49*   CREATININE 2.52* 2.51* 2.66*   CALCIUM 8.6 8.6 8.6               URINALYSIS:  Lab Results   Component Value Date/Time    COLORURINE DK Yellow 06/15/2019 0245    CLARITY Turbid (A) 06/15/2019 0245    SPECGRAVITY 1.020 06/15/2019 0245    PHURINE 5.0 06/15/2019 0245    KETONES Trace (A) 06/15/2019 0245    PROTEINURIN 300 (A) 06/15/2019 0245    BILIRUBINUR Negative 06/15/2019 0245    UROBILU 1.0 06/15/2019 0245    NITRITE Negative 06/15/2019 0245    LEUKESTERAS Negative 06/15/2019 0245    OCCULTBLOOD Negative 06/15/2019 0245     UPC  No results found for: TOTPROTUR No results found for: CREATININEU      Imaging reviewed  DX-CHEST-PORTABLE (1 VIEW)   Final Result         No significant interval change.      Unchanged diffuse interstitial prominence.      EC-ECHOCARDIOGRAM COMPLETE W/O CONT   Final Result      US-RUQ   Final Result      Cholelithiasis with mild gallbladder wall thickening but no positive sonographic Troncoso sign. This could indicate chronic cholecystitis or incomplete distention      DX-CHEST-PORTABLE (1 VIEW)   Final Result      1.  Satisfactory position of new right IJV multilumen dialysis catheter. No pneumothorax identified.      2.  Unchanged mild volume overload pattern.      DX-CHEST-PORTABLE (1 VIEW)   Final Result      No acute cardiac or pulmonary abnormality is noted. Stable cardiomegaly with increased interstitial opacity diffusely.            Current Facility-Administered Medications   Medication Dose Route Frequency Provider Last Rate Last Dose   • nystatin (MYCOSTATIN) cream   Topical BID Jessica Nevarez  A.P.R.N.       • furosemide (LASIX) tablet 80 mg  80 mg Oral BID DIURETIC Fadi Najjar, M.D.   80 mg at 07/19/20 0533   • fluticasone (FLOVENT HFA) 44 MCG/ACT inhaler 88 mcg  2 Puff Inhalation QDAILY (RT) Roger Florence Jr. D.O.   88 mcg at 07/19/20 0650   • amLODIPine (NORVASC) tablet 5 mg  5 mg Oral Q DAY GHULAM DimasOMelonie   5 mg at 07/19/20 0534   • ipratropium-albuterol (DUONEB) nebulizer solution  3 mL Nebulization Q2HRS PRN (RT) Christina Calderon M.D.       • hydrALAZINE (APRESOLINE) tablet 25 mg  25 mg Oral Q8HRS PRN Christina Calderon M.D.   25 mg at 07/10/20 1647   • oxyCODONE immediate release (ROXICODONE) tablet 10 mg  10 mg Oral TID PRN Christina Calderon M.D.   10 mg at 07/19/20 1229   • lactulose 20 GM/30ML solution 30 mL  30 mL Oral BID W/MEALS PRN Heber Armstrong M.D.       • NS (BOLUS) infusion 250 mL  250 mL Intravenous DIALYSIS PRN Eric Pompa M.D.       • Respiratory Therapy Consult   Nebulization Continuous RT Roger Florence Jr., D.O.       • DULoxetine (CYMBALTA) capsule 60 mg  60 mg Oral DAILY Roger Florence Jr., D.O.   60 mg at 07/19/20 0534   • hydrOXYzine HCl (ATARAX) tablet 25 mg  25 mg Oral TID PRN Roger Florence Jr., D.O.   25 mg at 07/10/20 1642   • levothyroxine (SYNTHROID) tablet 75 mcg  75 mcg Oral AM ES Roger Florence Jr. D.O.   75 mcg at 07/19/20 0534   • lidocaine (LIDODERM) 5 % 1 Patch  1 Patch Transdermal Q24HRS PRN Roger Florence Jr., D.O.   1 Patch at 07/04/20 2211   • traZODone (DESYREL) tablet 300 mg  300 mg Oral BID Roger Florence Jr., D.O.   300 mg at 07/19/20 0534   • rosuvastatin (CRESTOR) tablet 10 mg  10 mg Oral Q EVENING Roger Florence Jr., D.O.   10 mg at 07/18/20 1737   • senna-docusate (PERICOLACE or SENOKOT S) 8.6-50 MG per tablet 2 Tab  2 Tab Oral BID ROBY Sarmiento Jr..O.   2 Tab at 07/16/20 0601    And   • polyethylene glycol/lytes (MIRALAX) PACKET 1 Packet  1 Packet Oral QDAY PRN Roger Florence Jr., D.O.   1 Packet at 07/02/20 0500    And   • magnesium  hydroxide (MILK OF MAGNESIA) suspension 30 mL  30 mL Oral QDAY PRN ROBY Sarmiento Jr..O.        And   • bisacodyl (DULCOLAX) suppository 10 mg  10 mg Rectal QDAY PRN ROBY Sarmiento Jr..O.       • ondansetron (ZOFRAN) syringe/vial injection 4 mg  4 mg Intravenous Q4HRS PRN ROBY Sarmiento Jr..O.       • ondansetron (ZOFRAN ODT) dispertab 4 mg  4 mg Oral Q4HRS PRN ROBY Sarmiento Jr..O.       • Pharmacy Consult Request ...Pain Management Review 1 Each  1 Each Other PHARMACY TO DOSE ROBY Sarmiento Jr..LANCE.       • lactated ringers infusion (BOLUS)  500 mL Intravenous Once PRN ROBY Sarmiento Jr..LANCE.       • umeclidinium-vilanterol (ANORO ELLIPTA) inhaler 1 Puff  1 Puff Inhalation QDAILY (RT) ROBY Sarmiento Jr..O.   1 Puff at 07/19/20 0650   • insulin regular (HUMULIN R) injection 1-6 Units  1-6 Units Subcutaneous 4X/DAY ACHS Bob Glover M.D.   Stopped at 07/18/20 1700    And   • glucose 4 g chewable tablet 16 g  16 g Oral Q15 MIN PRN Bob Glover M.D.        And   • dextrose 50% (D50W) injection 50 mL  50 mL Intravenous Q15 MIN PRN Bob Glover M.D.             Assessment/Plan  69 y.o. female with a history of CKD 3, COPD who presented 6/30/2020 with shortness of breath, found to have bradycardia, hypotension, and HAN.     1.  HAN on CKD stage III : sec to cardio renal syndrome , stable  2.  HTN: OK  3.  Volume overload: Improving slowly  4.  Hyponatremia: Better  5.  Anemia: stable   6.  Cardiogenic shock.   7   Hyperkalemia: Improved  Recs:   no acute need for HD  Continue Lasix  Renal diet  Daily labs  Renal dose all meds  Avoid nephrotoxins  Prognosis guarded.

## 2020-07-19 NOTE — CARE PLAN
Problem: Safety  Goal: Will remain free from injury  Outcome: PROGRESSING AS EXPECTED  Note: Fall risk assessed, fall precautions in place, bed alarm on, pt verbalizes understanding of fall risk.      Problem: Venous Thromboembolism (VTW)/Deep Vein Thrombosis (DVT) Prevention:  Goal: Patient will participate in Venous Thrombosis (VTE)/Deep Vein Thrombosis (DVT)Prevention Measures  Outcome: PROGRESSING AS EXPECTED  Flowsheets (Taken 7/19/2020 0800)  Mechanical Prophylaxis: SCDs, Sequential Compression Device

## 2020-07-19 NOTE — PROGRESS NOTES
Hospital Medicine Daily Progress Note    Date of Service  7/19/2020    Chief Complaint  69 y.o. female admitted 6/30/2020 with junctional bradycardia rate 37 with cardiogenic shock, SOB.    Hospital Course    7/2:  This 68 yo female admitted by critical care to ICU for HR 37 junctional rhythm, COPD on 4 LPM NC at home, DM, hypothyroidism, hepatitis C, HTN, CHF, CKD stage 4 followed by Dr. Nava presented with worsening SOB, hypoxia and cough.  She was recently admitted 6/15/20 for COPD exacerbation and pulmonary edema, given amlodopine, metoprolol and lasix.   COVID 19 negative x3.  K was elevated 6.3.  Nephrology consulted and started HD with improvement of HR, BP and SOB.  3 HD sessions performed including today with 2 L removed.  She is feeling better, but still swollen in extremities, on 4 LPM NC baseline O2, ready for dc out of ICU.  Her BP is currently controlled at 97/69, no BP meds given at this point.  K normalized and Cr imporved.  Wbc increased to 23K on IV steroids.  I have started oral taper from solumedrol 60 IV q 12 to 40mg po daily.  It is unclear why patient was started on Rocephin on admission.  I have ordered a urine culture, no UA on admission or culture.  No evidence for pneumonia.  7/3:  Patient still with orthopnea, but no wheeze, wet cough noted today.  No HD today, waiting until tomorrow.  Has temporary triple lumen RIJ.  Wbc decreasing, Cr improving 3.24 to 2.58.  EF 80% but RVP elevated 55-60 with moderate mitral regurg noted. SR 76-92.  7/4:  Remains NSR on monitor.  No SOB, had 1.5 L removed during HD today.  Cr improving, urine output improving daily.  States having productive sputum thick brown, ordered sputum culture.  Lungs CTA b/l.  7/5:  No change, continues on baseline O2 4 LPM NC, no wheeze or crackles.  7/6:  C/o productive sputum, had 5 days of Rocephin, zithromax completed.  Ordered sputum culture, sent today with thick brown.  Wbc increased last 2 days, repeat CXR no  infiltrate, improved pulmonary edema since 6/30 CXR.  + VRE on urine culture, started zyvox bid x 7d ays.*    7/7: Patient reports making 250 mL urine output  Denies any dysuria  Plan for hemodialysis today  On Zyvox  She reports generalized weakness, will likely need placement, from assisted living facility  7/8 we will monitor ins and outs closely  Discussed with nephrology, Dr. Mccabe  Denies dysuria  Generalized weakness, requiring 1 person assist  Encourage activity  Discussed with RN  Baseline oxygen needs of 4 L, will taper as tolerated  Anemia requiring transfusion, will monitor closely  Reports lethargy today  7/9 no new complaints  Patient reports urine output  Per nephrology, will start trial of Lasix  Hemodialysis per nephrology  7/10 improving shortness of breath  Improving urinary output of greater than 1200 mL's  Dialysis per nephrology  Encourage activity  7/11 SBA per staff  Poor urine output, 400 ml/24 hours  7/12  Improved urine output  1800ml/24 hours  -deconditioned, snf referral  Encourage IS use  Improved urine output, for HD catheter removal per nephro  7/13 poor urine output overnight, 585/20 4 hours  On Lasix  Potassium of 5.4  7/14: Pt seen and examined no acute events overnight, feels SOB, denies any chest pain. No nausea or vomiting.  Nephrology following, no need for dialysis anymore  Appreciate rec.   7/15Pt resting in bed still with SOB, refused lasix this AM, discussed with pt regarding it and now agrees to take it.  Afebrile, potassium 5.7 this AM, will give a dose of kayexalate  Nephrology also following appreciate rec    7/16: Pt seen and examined, hemoglobin 6.2 this AM, received 1 unit of RBC now hemoglobin 7.8.  Still SOB c cont on aggressive diuresis   Nephrology following appreciate rec.   Hyperkalemia improving   7/17: No acute events overnight, afebrile, hemoglobin better this AM.  Cont on aggressive diuresis,   Hyperkalemia improved  Nephology following appreciate rec.    7/18: Pt seen and examined, afebrile, no nausea or vomiting. Eating breakfast.  SOB slowly improving   7/19: Afebrile, resting in bed, still with some SOB, denies any CP ,nausea or vomiting.   Nephrology following appreciate rec    Consultants/Specialty  Nephrology   Critical care    Code Status  DNAR, I ok    Disposition   getting HD per nephrology   Hep C +, no treatment yet, patient understands the benefits of treatment and will need to pursue outpatient tx.  Resides at [facility].  OT/PT no needs.  Baseline O2 5 LPM NC.    snf referral, CM to assist  Zyvox through July 13        Review of Systems  Review of Systems   Constitutional: Negative for chills and fever.   HENT: Negative for sore throat.    Respiratory: Negative for cough and shortness of breath.    Cardiovascular: Positive for leg swelling. Negative for chest pain and claudication.   Gastrointestinal: Negative for abdominal pain.   Genitourinary: Negative for dysuria.   Musculoskeletal: Negative for myalgias and neck pain.   Neurological: Positive for weakness. Negative for dizziness, focal weakness, loss of consciousness and headaches.   Psychiatric/Behavioral: The patient is not nervous/anxious.         Physical Exam  Temp:  [36.4 °C (97.6 °F)-37 °C (98.6 °F)] 36.4 °C (97.6 °F)  Pulse:  [56-65] 63  Resp:  [16-18] 18  BP: (113-135)/(34-98) 122/98  SpO2:  [92 %-99 %] 95 %    Physical Exam  Vitals signs and nursing note reviewed.   Constitutional:       General: She is not in acute distress.     Appearance: She is well-developed. She is not ill-appearing or toxic-appearing.   HENT:      Head: Normocephalic and atraumatic.      Nose: Nose normal.      Mouth/Throat:      Pharynx: No oropharyngeal exudate.   Eyes:      Extraocular Movements: Extraocular movements intact.      Pupils: Pupils are equal, round, and reactive to light.   Neck:      Musculoskeletal: Normal range of motion and neck supple.      Thyroid: No thyromegaly.      Vascular: No JVD.       Trachea: No tracheal deviation.   Cardiovascular:      Rate and Rhythm: Normal rate.      Pulses: Normal pulses.   Pulmonary:      Effort: Pulmonary effort is normal. No respiratory distress.      Breath sounds: Normal breath sounds. No wheezing.   Abdominal:      General: Bowel sounds are normal. There is no distension.      Palpations: Abdomen is soft.   Musculoskeletal: Normal range of motion.         General: Swelling present. No tenderness.   Skin:     General: Skin is warm and dry.      Coloration: Skin is not pale.   Neurological:      Mental Status: She is alert and oriented to person, place, and time.      Cranial Nerves: No cranial nerve deficit.      Motor: Weakness present. No abnormal muscle tone.   Psychiatric:         Thought Content: Thought content normal.         Fluids    Intake/Output Summary (Last 24 hours) at 7/19/2020 1032  Last data filed at 7/19/2020 0500  Gross per 24 hour   Intake --   Output 1500 ml   Net -1500 ml       Laboratory  Recent Labs     07/17/20  0350 07/18/20  0351 07/19/20  0333   WBC 9.0 7.6 6.1   RBC 2.66* 2.61* 2.44*   HEMOGLOBIN 8.1* 7.8* 7.3*   HEMATOCRIT 25.2* 24.4* 23.3*   MCV 94.7 93.5 95.5   MCH 30.5 29.9 29.9   MCHC 32.1* 32.0* 31.3*   RDW 50.5* 48.5 48.6   PLATELETCT 130* 156* 141*   MPV 8.7* 8.9* 9.0     Recent Labs     07/17/20  0350 07/18/20  0351 07/19/20  0333   SODIUM 137 139 137   POTASSIUM 4.5 4.7 4.1   CHLORIDE 96 95* 93*   CO2 30 32 32   GLUCOSE 107* 108* 112*   BUN 50* 48* 49*   CREATININE 2.52* 2.51* 2.66*   CALCIUM 8.6 8.6 8.6                   Imaging  DX-CHEST-PORTABLE (1 VIEW)   Final Result         No significant interval change.      Unchanged diffuse interstitial prominence.      EC-ECHOCARDIOGRAM COMPLETE W/O CONT   Final Result      US-RUQ   Final Result      Cholelithiasis with mild gallbladder wall thickening but no positive sonographic Troncoso sign. This could indicate chronic cholecystitis or incomplete distention      DX-CHEST-PORTABLE (1  VIEW)   Final Result      1.  Satisfactory position of new right IJV multilumen dialysis catheter. No pneumothorax identified.      2.  Unchanged mild volume overload pattern.      DX-CHEST-PORTABLE (1 VIEW)   Final Result      No acute cardiac or pulmonary abnormality is noted. Stable cardiomegaly with increased interstitial opacity diffusely.           Assessment/Plan  Hyperkalemia- (present on admission)  Assessment & Plan  Cont on lasix   Improved   Monitor       Acute renal failure superimposed on stage 4 chronic kidney disease (HCC)- (present on admission)  Assessment & Plan  Had recent admission, given lasix at discharge and new BP meds, metoprolol and norvasc.  HR 37 and low BP on admission.  Had HD emergently x 3 with improvement of SOB, 1 L off yesterday, 2 L off today.  Dr. Pompa following for HD ongoing determination, for now plans on TTS schedule.  Will need temporary HD catheter placed prior to dc and HD chair set up.  Patient states she gets better, then goes back to SOB for several months off and on.       7/7 HD per nephro, monitoring for renal recovery  7/8  S/p HD, ?outpatient HD, nephro to arrange  Need to arrange ride to HD 3x/weekly from skilled nursing vs snf  CM to assist  - encourage activity, OOB bid  On 5L O2    7/9 s/p HD (7/8) with hypotension, now resovled  - HD per nephrology  Improving BNP    7/10 bp stable, UOP 1250 ml  nephro following, ?diuretics vs ongoing HD  Appreciate input    7/11 on lasix per neprhology  Decreased u.o 400 ml/24 hours  - monitor  HD per neprho    7/12 U.O 1800ml/24 hours  - K5.2, HD per nephrology  On lasix    7/13 u.o. 585/24 hours  - K 5.4, increasing CR 2.85  Nephrology following  On lasix  - now off HD    _ cont on forced diuresis, nephrology following , off dialysis as no need at this time  Nephrology following appreciate rec.     Pneumonia due to infectious organism  Assessment & Plan  Had been treated with 5 days rocephin, 3 days zithromax since admission.  CXR  improved edema, no definite infiltrate to suggest pneumonia  Cultures neg  No need for abx     Leukocytosis- (present on admission)  Assessment & Plan  2/2 steroids.    Acute exacerbation of chronic obstructive pulmonary disease (COPD) (HCC)- (present on admission)  Assessment & Plan  Patient admitted with COPD exacerbation  7/2 tapering solumedrol IV to po prednisone 40 mg daily x 4 days.  7/4:  Sputum culture ordered for productive cough.  7/10: 99 on 5L, taper to baseline needs as tolerated    Wean of O2 as tolerated     DM type 2, uncontrolled, with renal complications (HCC)- (present on admission)  Assessment & Plan  SSI  Diabetic, renal diet.    UTI (urinary tract infection) due to Enterococcus  Assessment & Plan  VRE on urine culture ss zyvox  Increasing wbc daily.  7/6:  Treat zyvox x 7 days.    7/7 ongoing wbc  F/u am labs    7/8 improving wbc  Cont zyvox 7/13    Finished zyvox     Renovascular hypertension  Assessment & Plan  Ordered PRN BP meds for sbp >160 or DBP >90 hydralazine.  Monitor  Avoid BB since she went into cardiogenic shock 2/2 bradycardia.    Chronic hepatitis C without hepatic coma (HCC)- (present on admission)  Assessment & Plan  Will need outpatient treatment.  Elevated LFTs.    Junctional bradycardia- (present on admission)  Assessment & Plan  Combination of hyperkalemia, acute on CKD4 and metoprolol, norvasc  Now resolved s/p HD.    Acute on chronic respiratory failure with hypoxia (HCC)- (present on admission)  Assessment & Plan  Initially admitted to ICU for respiratory support.  No intubation is noted in the record.    Anemia- (present on admission)  Assessment & Plan  2/2 CKD stage 4.    7/8 hgb 6.6, plan for transfusion today  - f/u am cbc, monitor    7/9 s/p 2 u prbc, hgb 9  F/u am labs    7/10 monitor cbc  Significant hypotension noted with anemia and HD, s/p transfusion  hgb 9 to 8, monitor    7/11 hgb 8 to 7.9, monitor  F/u am labs    Hemoglobin 8.1 this AM   Monitor and  transfuse if below 7     Pulmonary hypertension (HCC)- (present on admission)  Assessment & Plan  EF 80%  RVP elevated 55-60%.  Likely will need some diuretic for pulmonary edema.  Currently on HD with 2 L removed on 7/3.      Hyponatremia  Assessment & Plan  7/12 - ARF, on HD per nephrology  - monitor    Tobacco dependence- (present on admission)  Assessment & Plan  Recommend cessation    Chronic use of opiate drug for therapeutic purpose- (present on admission)  Assessment & Plan  On oxycodone 10 mg tid at home, continued in hospital    EDITH (obstructive sleep apnea)- (present on admission)  Assessment & Plan  ongoing    Hypothyroidism- (present on admission)  Assessment & Plan  Continue home meds.    Essential hypertension- (present on admission)  Assessment & Plan  7/9 uncontrolled, with intermittent hypotension with HD   norvasc with holding parameters    Stable monitor and adjust medication as needed         VTE prophylaxis: scd

## 2020-07-20 ENCOUNTER — APPOINTMENT (OUTPATIENT)
Dept: RADIOLOGY | Facility: MEDICAL CENTER | Age: 70
DRG: 291 | End: 2020-07-20
Attending: INTERNAL MEDICINE
Payer: MEDICARE

## 2020-07-20 LAB
ABO GROUP BLD: NORMAL
ALBUMIN SERPL BCP-MCNC: 3 G/DL (ref 3.2–4.9)
ALBUMIN/GLOB SERPL: 1.4 G/DL
ALP SERPL-CCNC: 67 U/L (ref 30–99)
ALT SERPL-CCNC: 116 U/L (ref 2–50)
ANION GAP SERPL CALC-SCNC: 10 MMOL/L (ref 7–16)
AST SERPL-CCNC: 61 U/L (ref 12–45)
BARCODED ABORH UBTYP: 9500
BARCODED PRD CODE UBPRD: NORMAL
BARCODED UNIT NUM UBUNT: NORMAL
BILIRUB SERPL-MCNC: 0.3 MG/DL (ref 0.1–1.5)
BLD GP AB SCN SERPL QL: NORMAL
BUN SERPL-MCNC: 49 MG/DL (ref 8–22)
CALCIUM SERPL-MCNC: 8.5 MG/DL (ref 8.5–10.5)
CHLORIDE SERPL-SCNC: 94 MMOL/L (ref 96–112)
CO2 SERPL-SCNC: 33 MMOL/L (ref 20–33)
COMPONENT R 8504R: NORMAL
CREAT SERPL-MCNC: 2.77 MG/DL (ref 0.5–1.4)
ERYTHROCYTE [DISTWIDTH] IN BLOOD BY AUTOMATED COUNT: 47.1 FL (ref 35.9–50)
GLOBULIN SER CALC-MCNC: 2.2 G/DL (ref 1.9–3.5)
GLUCOSE BLD-MCNC: 186 MG/DL (ref 65–99)
GLUCOSE SERPL-MCNC: 119 MG/DL (ref 65–99)
HCT VFR BLD AUTO: 21.1 % (ref 37–47)
HCT VFR BLD AUTO: 26.2 % (ref 37–47)
HGB BLD-MCNC: 6.8 G/DL (ref 12–16)
HGB BLD-MCNC: 8.6 G/DL (ref 12–16)
MCH RBC QN AUTO: 30.4 PG (ref 27–33)
MCHC RBC AUTO-ENTMCNC: 32.2 G/DL (ref 33.6–35)
MCV RBC AUTO: 94.2 FL (ref 81.4–97.8)
PLATELET # BLD AUTO: 149 K/UL (ref 164–446)
PMV BLD AUTO: 9.1 FL (ref 9–12.9)
POTASSIUM SERPL-SCNC: 4.3 MMOL/L (ref 3.6–5.5)
PRODUCT TYPE UPROD: NORMAL
PROT SERPL-MCNC: 5.2 G/DL (ref 6–8.2)
RBC # BLD AUTO: 2.24 M/UL (ref 4.2–5.4)
RH BLD: NORMAL
SODIUM SERPL-SCNC: 137 MMOL/L (ref 135–145)
UNIT STATUS USTAT: NORMAL
WBC # BLD AUTO: 5.6 K/UL (ref 4.8–10.8)

## 2020-07-20 PROCEDURE — 86850 RBC ANTIBODY SCREEN: CPT

## 2020-07-20 PROCEDURE — 85014 HEMATOCRIT: CPT

## 2020-07-20 PROCEDURE — 97530 THERAPEUTIC ACTIVITIES: CPT

## 2020-07-20 PROCEDURE — 36430 TRANSFUSION BLD/BLD COMPNT: CPT

## 2020-07-20 PROCEDURE — A9270 NON-COVERED ITEM OR SERVICE: HCPCS | Performed by: INTERNAL MEDICINE

## 2020-07-20 PROCEDURE — 36415 COLL VENOUS BLD VENIPUNCTURE: CPT

## 2020-07-20 PROCEDURE — 85018 HEMOGLOBIN: CPT

## 2020-07-20 PROCEDURE — 94760 N-INVAS EAR/PLS OXIMETRY 1: CPT

## 2020-07-20 PROCEDURE — 700102 HCHG RX REV CODE 250 W/ 637 OVERRIDE(OP): Performed by: INTERNAL MEDICINE

## 2020-07-20 PROCEDURE — 770020 HCHG ROOM/CARE - TELE (206)

## 2020-07-20 PROCEDURE — A9270 NON-COVERED ITEM OR SERVICE: HCPCS | Performed by: HOSPITALIST

## 2020-07-20 PROCEDURE — 94640 AIRWAY INHALATION TREATMENT: CPT

## 2020-07-20 PROCEDURE — 700105 HCHG RX REV CODE 258

## 2020-07-20 PROCEDURE — 86900 BLOOD TYPING SEROLOGIC ABO: CPT

## 2020-07-20 PROCEDURE — 86923 COMPATIBILITY TEST ELECTRIC: CPT

## 2020-07-20 PROCEDURE — 700102 HCHG RX REV CODE 250 W/ 637 OVERRIDE(OP): Performed by: HOSPITALIST

## 2020-07-20 PROCEDURE — 80053 COMPREHEN METABOLIC PANEL: CPT

## 2020-07-20 PROCEDURE — 99232 SBSQ HOSP IP/OBS MODERATE 35: CPT | Performed by: INTERNAL MEDICINE

## 2020-07-20 PROCEDURE — 82962 GLUCOSE BLOOD TEST: CPT | Mod: 91

## 2020-07-20 PROCEDURE — 85027 COMPLETE CBC AUTOMATED: CPT

## 2020-07-20 PROCEDURE — 86901 BLOOD TYPING SEROLOGIC RH(D): CPT

## 2020-07-20 PROCEDURE — 99233 SBSQ HOSP IP/OBS HIGH 50: CPT | Performed by: INTERNAL MEDICINE

## 2020-07-20 PROCEDURE — P9016 RBC LEUKOCYTES REDUCED: HCPCS

## 2020-07-20 RX ORDER — SODIUM CHLORIDE 9 MG/ML
INJECTION, SOLUTION INTRAVENOUS
Status: COMPLETED
Start: 2020-07-20 | End: 2020-07-20

## 2020-07-20 RX ADMIN — NYSTATIN: 100000 CREAM TOPICAL at 16:40

## 2020-07-20 RX ADMIN — DULOXETINE HYDROCHLORIDE 60 MG: 60 CAPSULE, DELAYED RELEASE ORAL at 05:36

## 2020-07-20 RX ADMIN — FLUTICASONE PROPIONATE 88 MCG: 44 AEROSOL, METERED RESPIRATORY (INHALATION) at 08:21

## 2020-07-20 RX ADMIN — TRAZODONE HYDROCHLORIDE 300 MG: 150 TABLET ORAL at 05:38

## 2020-07-20 RX ADMIN — OXYCODONE HYDROCHLORIDE 10 MG: 10 TABLET ORAL at 03:53

## 2020-07-20 RX ADMIN — TRAZODONE HYDROCHLORIDE 300 MG: 150 TABLET ORAL at 20:55

## 2020-07-20 RX ADMIN — ROSUVASTATIN CALCIUM 10 MG: 20 TABLET, FILM COATED ORAL at 16:38

## 2020-07-20 RX ADMIN — AMLODIPINE BESYLATE 5 MG: 5 TABLET ORAL at 05:36

## 2020-07-20 RX ADMIN — OXYCODONE HYDROCHLORIDE 10 MG: 10 TABLET ORAL at 07:28

## 2020-07-20 RX ADMIN — INSULIN HUMAN 1 UNITS: 100 INJECTION, SOLUTION PARENTERAL at 20:58

## 2020-07-20 RX ADMIN — UMECLIDINIUM BROMIDE AND VILANTEROL TRIFENATATE 1 PUFF: 62.5; 25 POWDER RESPIRATORY (INHALATION) at 08:21

## 2020-07-20 RX ADMIN — LEVOTHYROXINE SODIUM 75 MCG: 75 TABLET ORAL at 05:36

## 2020-07-20 RX ADMIN — INSULIN HUMAN 1 UNITS: 100 INJECTION, SOLUTION PARENTERAL at 12:17

## 2020-07-20 RX ADMIN — OXYCODONE HYDROCHLORIDE 10 MG: 10 TABLET ORAL at 20:55

## 2020-07-20 RX ADMIN — FUROSEMIDE 80 MG: 40 TABLET ORAL at 05:36

## 2020-07-20 RX ADMIN — SODIUM CHLORIDE 500 ML: 9 INJECTION, SOLUTION INTRAVENOUS at 07:47

## 2020-07-20 RX ADMIN — FUROSEMIDE 80 MG: 40 TABLET ORAL at 15:33

## 2020-07-20 RX ADMIN — OXYCODONE HYDROCHLORIDE 10 MG: 10 TABLET ORAL at 15:33

## 2020-07-20 ASSESSMENT — COGNITIVE AND FUNCTIONAL STATUS - GENERAL
CLIMB 3 TO 5 STEPS WITH RAILING: A LITTLE
MOBILITY SCORE: 23
SUGGESTED CMS G CODE MODIFIER MOBILITY: CI

## 2020-07-20 ASSESSMENT — ENCOUNTER SYMPTOMS
FOCAL WEAKNESS: 0
DIZZINESS: 0
HEADACHES: 0
CHILLS: 0
NERVOUS/ANXIOUS: 0
WEAKNESS: 1
SORE THROAT: 0
MYALGIAS: 0
COUGH: 0
SHORTNESS OF BREATH: 0
FEVER: 0
CLAUDICATION: 0
LOSS OF CONSCIOUSNESS: 0
ABDOMINAL PAIN: 0
NECK PAIN: 0

## 2020-07-20 ASSESSMENT — FIBROSIS 4 INDEX: FIB4 SCORE: 2.66

## 2020-07-20 ASSESSMENT — GAIT ASSESSMENTS
DISTANCE (FEET): 35
GAIT LEVEL OF ASSIST: SUPERVISED
ASSISTIVE DEVICE: FRONT WHEEL WALKER

## 2020-07-20 NOTE — CARE PLAN
Problem: Knowledge Deficit  Goal: Knowledge of disease process/condition, treatment plan, diagnostic tests, and medications will improve  Outcome: PROGRESSING AS EXPECTED     Problem: Pain Management  Goal: Pain level will decrease to patient's comfort goal  Outcome: PROGRESSING SLOWER THAN EXPECTED

## 2020-07-20 NOTE — CARE PLAN
Problem: Safety  Goal: Will remain free from injury  Outcome: PROGRESSING AS EXPECTED     Problem: Psychosocial Needs:  Goal: Level of anxiety will decrease  Outcome: PROGRESSING AS EXPECTED     Problem: Urinary Elimination:  Goal: Ability to reestablish a normal urinary elimination pattern will improve  Outcome: PROGRESSING AS EXPECTED

## 2020-07-20 NOTE — PROGRESS NOTES
Bedside report received, pt care assumed, tele box on. VSS, pt assessment complete. Pt aaox4, no signs of distress noted at this time. POC discussed with pt and verbalizes no questions. Pt denies any additional needs at this time. Bed in lowest position, bed alarm on, pt educated on fall risk and verbalized understanding, call light within reach.

## 2020-07-20 NOTE — PROGRESS NOTES
Nephrology Daily Progress Note    Date of Service  7/20/2020    Chief Complaint  69 y.o. female with a history of CKD 3, COPD who presented 6/30/2020 with shortness of breath, found to have bradycardia, hypotension, and HAN.     Interval Problem Update  7/1 -patient had dialysis yesterday with no fluid removed, and resolution of shock on completion of dialysis.  Patient had 500 mL of urine output documented last 24 hours.  Patient had dialysis again this morning with 1 L removed.  Patient says her breathing is better.  Denies chest pain, shortness of breath at this time.  7/3 -patient had third session of dialysis yesterday with 2 L removed.  Tolerated well.  Denies chest pain this morning.  Complains of continued shortness of breath, but improved from admission.  7/5 -patient had dialysis yesterday with 1.5 L removed.  Patient says she is urinating, but no urine output being recorded.  Patient says her shortness of breath is still there, but better compared to admission.  Patient denies chest pain.  7/6 -doing better, less SOB  Scheduled for HD TTS  7/9 -doing well, improved SOB  UOP 1500 cc/24 H  Holding dialysis -watching for recovery  7/10 -doing well  Edema improving  SOB better  Good UOP  Holding dialysis  7/11 -doing well, no complaints  SOB and edema improved  Good UOP  Off HD  7/12 -no acute events, no complaints  Good UOP  Creat improving  Off HD  7/13 patient is doing better , still dyspnea on exertion , no chest pain, creatinine is worse.  7/14 patient is complaining of back pain, no chest pain  7/15 patient refused diuretic therapy at this morning  7/16 patient is doing better today, no chest pain no shortness of breath  7/17 patient has no chest pain no shortness of breath  7/18 pt is doing better, still low Po2 when moving  7/19 pt is doing about the same  7/20 -urine output not recorded, but appears to have had 500 mL urine output since 7 AM.  Patient says her shortness of breath is improved, denies  chest pain.    Review of Systems  Review of Systems   Constitutional: Negative for fever.   Respiratory: Negative for shortness of breath.    Cardiovascular: Negative for chest pain.   Gastrointestinal: Negative for abdominal pain.   All other systems reviewed and are negative.       Physical Exam  Temp:  [35.9 °C (96.7 °F)-36.9 °C (98.5 °F)] 36.6 °C (97.8 °F)  Pulse:  [57-63] 57  Resp:  [18-20] 18  BP: (100-148)/(35-55) 124/43  SpO2:  [91 %-100 %] 91 %    Physical Exam   Constitutional: She is oriented to person, place, and time. She appears well-developed. No distress.   HENT:   Mouth/Throat: Oropharynx is clear and moist. No oropharyngeal exudate.   Eyes: EOM are normal. No scleral icterus.   Neck: No tracheal deviation present.   Cardiovascular: Normal heart sounds.   No murmur heard.  Pulmonary/Chest: Effort normal. No stridor. She has no rales.   Abdominal: Soft. There is no abdominal tenderness.   Musculoskeletal: Normal range of motion.         General: Edema (1-2+ lower extremity dependent edema) present.   Neurological: She is alert and oriented to person, place, and time.   Skin: Skin is warm and dry.   Psychiatric: She has a normal mood and affect.   Dialysis access: None    Fluids    Intake/Output Summary (Last 24 hours) at 7/20/2020 1507  Last data filed at 7/20/2020 0910  Gross per 24 hour   Intake 710 ml   Output --   Net 710 ml       Laboratory  Labs reviewed, pertinent labs below.  Recent Labs     07/18/20  0351 07/19/20  0333 07/20/20  0348 07/20/20  1207   WBC 7.6 6.1 5.6  --    RBC 2.61* 2.44* 2.24*  --    HEMOGLOBIN 7.8* 7.3* 6.8* 8.6*   HEMATOCRIT 24.4* 23.3* 21.1* 26.2*   MCV 93.5 95.5 94.2  --    MCH 29.9 29.9 30.4  --    MCHC 32.0* 31.3* 32.2*  --    RDW 48.5 48.6 47.1  --    PLATELETCT 156* 141* 149*  --    MPV 8.9* 9.0 9.1  --      Recent Labs     07/18/20  0351 07/19/20  0333 07/20/20  0348   SODIUM 139 137 137   POTASSIUM 4.7 4.1 4.3   CHLORIDE 95* 93* 94*   CO2 32 32 33   GLUCOSE 108*  112* 119*   BUN 48* 49* 49*   CREATININE 2.51* 2.66* 2.77*   CALCIUM 8.6 8.6 8.5               URINALYSIS:  Lab Results   Component Value Date/Time    COLORURINE Yellow 07/04/2020 0039    CLARITY Clear 07/04/2020 0039    SPECGRAVITY 1.011 07/04/2020 0039    PHURINE 5.0 07/04/2020 0039    KETONES Negative 07/04/2020 0039    PROTEINURIN 100 (A) 07/04/2020 0039    BILIRUBINUR Negative 07/04/2020 0039    UROBILU 0.2 07/04/2020 0039    NITRITE Negative 07/04/2020 0039    LEUKESTERAS Negative 07/04/2020 0039    OCCULTBLOOD Small (A) 07/04/2020 0039     UPC  No results found for: TOTPROTUR No results found for: CREATININEU      Imaging reviewed  IR-US GUIDED PIV   Final Result    Ultrasound-guided PERIPHERAL IV INSERTION performed by    qualified nursing staff as above.      DX-CHEST-PORTABLE (1 VIEW)   Final Result         No significant interval change.      Unchanged diffuse interstitial prominence.      EC-ECHOCARDIOGRAM COMPLETE W/O CONT   Final Result      US-RUQ   Final Result      Cholelithiasis with mild gallbladder wall thickening but no positive sonographic Troncoso sign. This could indicate chronic cholecystitis or incomplete distention      DX-CHEST-PORTABLE (1 VIEW)   Final Result      1.  Satisfactory position of new right IJV multilumen dialysis catheter. No pneumothorax identified.      2.  Unchanged mild volume overload pattern.      DX-CHEST-PORTABLE (1 VIEW)   Final Result      No acute cardiac or pulmonary abnormality is noted. Stable cardiomegaly with increased interstitial opacity diffusely.            Current Facility-Administered Medications   Medication Dose Route Frequency Provider Last Rate Last Dose   • nystatin (MYCOSTATIN) cream   Topical BID Jessica Nevarez A.P.R.N.       • furosemide (LASIX) tablet 80 mg  80 mg Oral BID DIURETIC Fadi Najjar, M.D.   80 mg at 07/20/20 0536   • fluticasone (FLOVENT HFA) 44 MCG/ACT inhaler 88 mcg  2 Puff Inhalation QDAILY (RT) Roger Florence Jr., D.OMelonie   88 mcg  at 07/20/20 0821   • amLODIPine (NORVASC) tablet 5 mg  5 mg Oral Q DAY GHULAM DimasOMelonie   5 mg at 07/20/20 0536   • ipratropium-albuterol (DUONEB) nebulizer solution  3 mL Nebulization Q2HRS PRN (RT) Christina Calderon M.D.       • hydrALAZINE (APRESOLINE) tablet 25 mg  25 mg Oral Q8HRS PRN Christina Calderon M.D.   25 mg at 07/10/20 1647   • oxyCODONE immediate release (ROXICODONE) tablet 10 mg  10 mg Oral TID PRN Christina Calderon M.D.   10 mg at 07/20/20 0728   • lactulose 20 GM/30ML solution 30 mL  30 mL Oral BID W/MEALS PRN Heber Armstrong M.D.       • NS (BOLUS) infusion 250 mL  250 mL Intravenous DIALYSIS PRN Eric Pompa M.D.       • Respiratory Therapy Consult   Nebulization Continuous RT Roger Florence Jr., D.O.       • DULoxetine (CYMBALTA) capsule 60 mg  60 mg Oral DAILY Roger Florence Jr., D.O.   60 mg at 07/20/20 0536   • hydrOXYzine HCl (ATARAX) tablet 25 mg  25 mg Oral TID PRN Roger Florence Jr., D.O.   25 mg at 07/10/20 1642   • levothyroxine (SYNTHROID) tablet 75 mcg  75 mcg Oral AM ES Roger Florence Jr. D.O.   75 mcg at 07/20/20 0536   • lidocaine (LIDODERM) 5 % 1 Patch  1 Patch Transdermal Q24HRS PRN Roger Florence Jr., D.O.   1 Patch at 07/04/20 2211   • traZODone (DESYREL) tablet 300 mg  300 mg Oral BID Roger Florence Jr. D.O.   300 mg at 07/20/20 0538   • rosuvastatin (CRESTOR) tablet 10 mg  10 mg Oral Q EVENING Roger Florence Jr., D.O.   10 mg at 07/19/20 1805   • senna-docusate (PERICOLACE or SENOKOT S) 8.6-50 MG per tablet 2 Tab  2 Tab Oral BID Roger Florence Jr., D.O.   2 Tab at 07/16/20 0601    And   • polyethylene glycol/lytes (MIRALAX) PACKET 1 Packet  1 Packet Oral QDAY PRN ROBY Sarmiento Jr..O.   1 Packet at 07/02/20 0500    And   • magnesium hydroxide (MILK OF MAGNESIA) suspension 30 mL  30 mL Oral QDAY PRN ROBY Sarmiento Jr..O.        And   • bisacodyl (DULCOLAX) suppository 10 mg  10 mg Rectal QDAY PRN Roger Florence Jr., ROBY.O.       • ondansetron (ZOFRAN) syringe/vial  injection 4 mg  4 mg Intravenous Q4HRS PRN Roger Florence Jr., D.O.       • ondansetron (ZOFRAN ODT) dispertab 4 mg  4 mg Oral Q4HRS PRN Roger Florence Jr., D.O.       • Pharmacy Consult Request ...Pain Management Review 1 Each  1 Each Other PHARMACY TO DOSE ROBY Sarmietno Jr..LANCE.       • lactated ringers infusion (BOLUS)  500 mL Intravenous Once PRN Roger Florence Jr., D.O.       • umeclidinium-vilanterol (ANORO ELLIPTA) inhaler 1 Puff  1 Puff Inhalation QDAILY (RT) ROBY Sarmiento Jr..O.   1 Puff at 07/20/20 0821   • insulin regular (HUMULIN R) injection 1-6 Units  1-6 Units Subcutaneous 4X/DAY ACHLUIS Glover M.D.   1 Units at 07/20/20 1217    And   • glucose 4 g chewable tablet 16 g  16 g Oral Q15 MIN PRN Bob Glover M.D.        And   • dextrose 50% (D50W) injection 50 mL  50 mL Intravenous Q15 MIN PRN Bob Glover M.D.             Assessment/Plan  69 y.o. female with a history of CKD 3, COPD who presented 6/30/2020 with shortness of breath, found to have bradycardia, hypotension, and HAN.    1.  HAN on CKD stage III.  Nonoliguric.  Please record urine output.  Required dialysis from 6/30/2020 through 7/8/2020. The patient's baseline creatinine runs between 1.8 and 2.2.  On admission creatinine was up to 5.7.  HAN likely due to ATN from cardiogenic shock/bradycardia on admission.  The patient might have progressed to CKD4.  No acute need for dialysis.  Avoid nephrotoxins.  Continue diuretics.  Check labs daily.    2.  Shortness of breath, persistent.  Continue Lasix 80 mg p.o. twice daily.     3.  Azotemia, slightly worsening.  No signs or symptoms of uremia.  No need for dialysis.  Check labs daily.    4.  Cardiogenic shock, improved.    5.  Normocytic anemia, worsening, required blood transfusion on 7/20/2020.  Unclear etiology of worsening anemia.  Check CBC daily.    6.  Thrombocytopenia, persistent.  Unclear etiology.  Check CBC daily.    7.  Hypertension,  controlled.    Following    Eric Pompa MD  Nephrology

## 2020-07-20 NOTE — THERAPY
Physical Therapy   Daily Treatment     Patient Name: Priya Callahan  Age:  69 y.o., Sex:  female  Medical Record #: [id number]  Today's Date: 7/20/2020     Precautions: Fall Risk    Assessment    Pt is consistently performing x35' of gait with FWW which is her baseline distance (utilizes wc for longer distances due to ease of fatigue). She does desaturate with increased activity, and today dropped from 98% to 78% on 5L via nasal cannula after walking. However, pt was able to recover to 91% within 1-2mins of seated rest. At this time, pt can continue to mobilize with nursing staff and does not require further acute PT intervention. At [place], pt appears functionally capable of return to her assisted living facility as she very near or at her functional baseline.     Plan    Discharge secondary to goals met.    Discharge recommendations:  Anticipate that the patient will have no further physical therapy needs after discharge from the hospital.       Objective       07/20/20 1140   Gait Analysis   Gait Level Of Assist Supervised   Assistive Device Front Wheel Walker   Distance (Feet) 35   Comments Sp02 did reduce from 98% to 78% on 5L via nasal cannula. With seated, rest, improved to 91% within 1-2mins   Bed Mobility    Supine to Sit Supervised   Scooting Supervised   Rolling Supervised   Functional Mobility   Sit to Stand Supervised   Bed, Chair, Wheelchair Transfer Supervised   Transfer Method Stand Step   Anticipated Discharge Equipment   DC Equipment None

## 2020-07-20 NOTE — PROGRESS NOTES
Report received from day shift RN, assumed care of pt. Pt A&O4, in no apparent distress. Plan of care discussed with pt, labs and chart reviewed. All needs met at this time. Tele box on, pt in bed. Call light within reach, bed locked and in lowest position. All fall precautions and hourly rounding in place. Will continue to monitor.

## 2020-07-20 NOTE — PROGRESS NOTES
Hospital Medicine Daily Progress Note    Date of Service  7/20/2020    Chief Complaint  69 y.o. female admitted 6/30/2020 with junctional bradycardia rate 37 with cardiogenic shock, SOB.    Hospital Course    7/2:  This 68 yo female admitted by critical care to ICU for HR 37 junctional rhythm, COPD on 4 LPM NC at home, DM, hypothyroidism, hepatitis C, HTN, CHF, CKD stage 4 followed by Dr. Nava presented with worsening SOB, hypoxia and cough.  She was recently admitted 6/15/20 for COPD exacerbation and pulmonary edema, given amlodopine, metoprolol and lasix.   COVID 19 negative x3.  K was elevated 6.3.  Nephrology consulted and started HD with improvement of HR, BP and SOB.  3 HD sessions performed including today with 2 L removed.  She is feeling better, but still swollen in extremities, on 4 LPM NC baseline O2, ready for dc out of ICU.  Her BP is currently controlled at 97/69, no BP meds given at this point.  K normalized and Cr imporved.  Wbc increased to 23K on IV steroids.  I have started oral taper from solumedrol 60 IV q 12 to 40mg po daily.  It is unclear why patient was started on Rocephin on admission.  I have ordered a urine culture, no UA on admission or culture.  No evidence for pneumonia.  7/3:  Patient still with orthopnea, but no wheeze, wet cough noted today.  No HD today, waiting until tomorrow.  Has temporary triple lumen RIJ.  Wbc decreasing, Cr improving 3.24 to 2.58.  EF 80% but RVP elevated 55-60 with moderate mitral regurg noted. SR 76-92.  7/4:  Remains NSR on monitor.  No SOB, had 1.5 L removed during HD today.  Cr improving, urine output improving daily.  States having productive sputum thick brown, ordered sputum culture.  Lungs CTA b/l.  7/5:  No change, continues on baseline O2 4 LPM NC, no wheeze or crackles.  7/6:  C/o productive sputum, had 5 days of Rocephin, zithromax completed.  Ordered sputum culture, sent today with thick brown.  Wbc increased last 2 days, repeat CXR no  infiltrate, improved pulmonary edema since 6/30 CXR.  + VRE on urine culture, started zyvox bid x 7d ays.*    7/7: Patient reports making 250 mL urine output  Denies any dysuria  Plan for hemodialysis today  On Zyvox  She reports generalized weakness, will likely need placement, from assisted living facility  7/8 we will monitor ins and outs closely  Discussed with nephrology, Dr. Mccabe  Denies dysuria  Generalized weakness, requiring 1 person assist  Encourage activity  Discussed with RN  Baseline oxygen needs of 4 L, will taper as tolerated  Anemia requiring transfusion, will monitor closely  Reports lethargy today  7/9 no new complaints  Patient reports urine output  Per nephrology, will start trial of Lasix  Hemodialysis per nephrology  7/10 improving shortness of breath  Improving urinary output of greater than 1200 mL's  Dialysis per nephrology  Encourage activity  7/11 SBA per staff  Poor urine output, 400 ml/24 hours  7/12  Improved urine output  1800ml/24 hours  -deconditioned, snf referral  Encourage IS use  Improved urine output, for HD catheter removal per nephro  7/13 poor urine output overnight, 585/20 4 hours  On Lasix  Potassium of 5.4  7/14: Pt seen and examined no acute events overnight, feels SOB, denies any chest pain. No nausea or vomiting.  Nephrology following, no need for dialysis anymore  Appreciate rec.   7/15Pt resting in bed still with SOB, refused lasix this AM, discussed with pt regarding it and now agrees to take it.  Afebrile, potassium 5.7 this AM, will give a dose of kayexalate  Nephrology also following appreciate rec    7/16: Pt seen and examined, hemoglobin 6.2 this AM, received 1 unit of RBC now hemoglobin 7.8.  Still SOB c cont on aggressive diuresis   Nephrology following appreciate rec.   Hyperkalemia improving   7/17: No acute events overnight, afebrile, hemoglobin better this AM.  Cont on aggressive diuresis,   Hyperkalemia improved  Nephology following appreciate rec.    7/18: Pt seen and examined, afebrile, no nausea or vomiting. Eating breakfast.  SOB slowly improving   7/19: Afebrile, resting in bed, still with some SOB, denies any CP ,nausea or vomiting.   Nephrology following appreciate rec  7/20: Hemoglobin this morning 6.8 receiving 1 unit of RBC, resting in bed, no acute events overnight. Cont with aggressive diuresis  Nephrology following appreciate rec.     Consultants/Specialty  Nephrology   Critical care    Code Status  DNAR, I ok    Disposition  tbd         Review of Systems  Review of Systems   Constitutional: Negative for chills and fever.   HENT: Negative for sore throat.    Respiratory: Negative for cough and shortness of breath.    Cardiovascular: Positive for leg swelling. Negative for chest pain and claudication.   Gastrointestinal: Negative for abdominal pain.   Genitourinary: Negative for dysuria.   Musculoskeletal: Negative for myalgias and neck pain.   Neurological: Positive for weakness. Negative for dizziness, focal weakness, loss of consciousness and headaches.   Psychiatric/Behavioral: The patient is not nervous/anxious.         Physical Exam  Temp:  [35.9 °C (96.7 °F)-36.9 °C (98.5 °F)] 36.4 °C (97.5 °F)  Pulse:  [55-63] 63  Resp:  [18-20] 19  BP: (100-148)/(35-55) 100/45  SpO2:  [92 %-100 %] 97 %    Physical Exam  Vitals signs and nursing note reviewed.   Constitutional:       General: She is not in acute distress.     Appearance: She is well-developed. She is not ill-appearing or toxic-appearing.   HENT:      Head: Normocephalic and atraumatic.      Nose: Nose normal.      Mouth/Throat:      Pharynx: No oropharyngeal exudate.   Eyes:      Extraocular Movements: Extraocular movements intact.      Pupils: Pupils are equal, round, and reactive to light.   Neck:      Musculoskeletal: Normal range of motion and neck supple.      Thyroid: No thyromegaly.      Vascular: No JVD.      Trachea: No tracheal deviation.   Cardiovascular:      Rate and Rhythm:  Normal rate.      Pulses: Normal pulses.   Pulmonary:      Effort: Pulmonary effort is normal. No respiratory distress.      Breath sounds: Normal breath sounds. No wheezing.   Abdominal:      General: Bowel sounds are normal. There is no distension.      Palpations: Abdomen is soft.   Musculoskeletal: Normal range of motion.         General: Swelling present. No tenderness.   Skin:     General: Skin is warm and dry.      Coloration: Skin is not pale.   Neurological:      Mental Status: She is alert and oriented to person, place, and time.      Cranial Nerves: No cranial nerve deficit.      Motor: Weakness present. No abnormal muscle tone.   Psychiatric:         Thought Content: Thought content normal.         Fluids    Intake/Output Summary (Last 24 hours) at 7/20/2020 1042  Last data filed at 7/20/2020 0910  Gross per 24 hour   Intake 1430 ml   Output --   Net 1430 ml       Laboratory  Recent Labs     07/18/20  0351 07/19/20  0333 07/20/20  0348   WBC 7.6 6.1 5.6   RBC 2.61* 2.44* 2.24*   HEMOGLOBIN 7.8* 7.3* 6.8*   HEMATOCRIT 24.4* 23.3* 21.1*   MCV 93.5 95.5 94.2   MCH 29.9 29.9 30.4   MCHC 32.0* 31.3* 32.2*   RDW 48.5 48.6 47.1   PLATELETCT 156* 141* 149*   MPV 8.9* 9.0 9.1     Recent Labs     07/18/20  0351 07/19/20  0333 07/20/20  0348   SODIUM 139 137 137   POTASSIUM 4.7 4.1 4.3   CHLORIDE 95* 93* 94*   CO2 32 32 33   GLUCOSE 108* 112* 119*   BUN 48* 49* 49*   CREATININE 2.51* 2.66* 2.77*   CALCIUM 8.6 8.6 8.5                   Imaging  IR-US GUIDED PIV   Final Result    Ultrasound-guided PERIPHERAL IV INSERTION performed by    qualified nursing staff as above.      DX-CHEST-PORTABLE (1 VIEW)   Final Result         No significant interval change.      Unchanged diffuse interstitial prominence.      EC-ECHOCARDIOGRAM COMPLETE W/O CONT   Final Result      US-RUQ   Final Result      Cholelithiasis with mild gallbladder wall thickening but no positive sonographic Troncoso sign. This could indicate chronic  cholecystitis or incomplete distention      DX-CHEST-PORTABLE (1 VIEW)   Final Result      1.  Satisfactory position of new right IJV multilumen dialysis catheter. No pneumothorax identified.      2.  Unchanged mild volume overload pattern.      DX-CHEST-PORTABLE (1 VIEW)   Final Result      No acute cardiac or pulmonary abnormality is noted. Stable cardiomegaly with increased interstitial opacity diffusely.           Assessment/Plan  Hyperkalemia- (present on admission)  Assessment & Plan  Cont on lasix   Improved   Monitor       Acute renal failure superimposed on stage 4 chronic kidney disease (HCC)- (present on admission)  Assessment & Plan  Had recent admission, given lasix at discharge and new BP meds, metoprolol and norvasc.  HR 37 and low BP on admission.  Had HD emergently x 3 with improvement of SOB, 1 L off yesterday, 2 L off today.  Dr. Pompa following for HD ongoing determination, for now plans on TTS schedule.  Will need temporary HD catheter placed prior to dc and HD chair set up.  Patient states she gets better, then goes back to SOB for several months off and on.       7/7 HD per nephro, monitoring for renal recovery  7/8  S/p HD, ?outpatient HD, nephro to arrange  Need to arrange [facility] 3x[facility]ly [facility]  CM to assist  - encourage activity, OOB bid  On 5L O2    7/9 s/p HD (7/8) with hypotension, now resovled  - HD per nephrology  Improving BNP    7/10 bp stable, UOP 1250 ml  nephro following, ?diuretics vs ongoing HD  Appreciate input    7/11 on lasix per neprhology  Decreased u.o 400 ml/24 hours  - monitor  HD per neprho    7/12 U.O 1800ml/24 hours  - K5.2, HD per nephrology  On lasix    7/13 u.o. 585/24 hours  - K 5.4, increasing CR 2.85  Nephrology following  On lasix  - now off HD    _ cont on forced diuresis, nephrology following , off dialysis as no need at this time  Nephrology following appreciate rec.     Pneumonia due to infectious organism  Assessment & Plan  Had been treated  with 5 days rocephin, 3 days zithromax since admission.  CXR improved edema, no definite infiltrate to suggest pneumonia  Cultures neg  No need for abx     Leukocytosis- (present on admission)  Assessment & Plan  2/2 steroids.    Acute exacerbation of chronic obstructive pulmonary disease (COPD) (HCC)- (present on admission)  Assessment & Plan  Patient admitted with COPD exacerbation  7/2 tapering solumedrol IV to po prednisone 40 mg daily x 4 days.  7/4:  Sputum culture ordered for productive cough.  7/10: 99 on 5L, taper to baseline needs as tolerated    Wean of O2 as tolerated     DM type 2, uncontrolled, with renal complications (HCC)- (present on admission)  Assessment & Plan  SSI  Diabetic, renal diet.    UTI (urinary tract infection) due to Enterococcus  Assessment & Plan  VRE on urine culture ss zyvox  Increasing wbc daily.  7/6:  Treat zyvox x 7 days.    7/7 ongoing wbc  F/u am labs    7/8 improving wbc  Cont zyvox 7/13    Finished zyvox     Renovascular hypertension  Assessment & Plan  Ordered PRN BP meds for sbp >160 or DBP >90 hydralazine.  Monitor  Avoid BB since she went into cardiogenic shock 2/2 bradycardia.    Chronic hepatitis C without hepatic coma (HCC)- (present on admission)  Assessment & Plan  Will need outpatient treatment.  Elevated LFTs.    Junctional bradycardia- (present on admission)  Assessment & Plan  Combination of hyperkalemia, acute on CKD4 and metoprolol, norvasc  Now resolved s/p HD.    Acute on chronic respiratory failure with hypoxia (HCC)- (present on admission)  Assessment & Plan  Initially admitted to ICU for respiratory support.  No intubation is noted in the record.    Anemia- (present on admission)  Assessment & Plan  2/2 CKD stage 4.    7/8 hgb 6.6, plan for transfusion today  - f/u am cbc, monitor    7/9 s/p 2 u prbc, hgb 9  F/u am labs    7/10 monitor cbc  Significant hypotension noted with anemia and HD, s/p transfusion  hgb 9 to 8, monitor    7/11 hgb 8 to 7.9,  monitor  F/u am labs    Hemoglobin 6.8 this AM receiving 1 unit of rbc   Monitor and transfuse if below 7     Pulmonary hypertension (HCC)- (present on admission)  Assessment & Plan  EF 80%  RVP elevated 55-60%.  Likely will need some diuretic for pulmonary edema.  Currently on HD with 2 L removed on 7/3.      Hyponatremia  Assessment & Plan  7/12 - ARF, on HD per nephrology  - monitor    Tobacco dependence- (present on admission)  Assessment & Plan  Recommend cessation    Chronic use of opiate drug for therapeutic purpose- (present on admission)  Assessment & Plan  On oxycodone 10 mg tid at home, continued in hospital    EDITH (obstructive sleep apnea)- (present on admission)  Assessment & Plan  ongoing    Hypothyroidism- (present on admission)  Assessment & Plan  Continue home meds.    Essential hypertension- (present on admission)  Assessment & Plan  7/9 uncontrolled, with intermittent hypotension with HD   norvasc with holding parameters    Stable monitor and adjust medication as needed         VTE prophylaxis: scd

## 2020-07-20 NOTE — PROGRESS NOTES
Paged hospitalist regarding hemoglobin of 6.8. Dr. Mcmillan made aware.  Order received via telephone with read back to transfuse one unit PRBC.

## 2020-07-20 NOTE — DISCHARGE PLANNING
Agency/Facility Name: [facility]  Spoke To: Sherman  Outcome: Needs to know what type of Medicare patient has.    Received Choice form at 0810  Agency/Facility Name: [facility]  Referral sent per Choice form @ 0842

## 2020-07-21 ENCOUNTER — PATIENT OUTREACH (OUTPATIENT)
Dept: HEALTH INFORMATION MANAGEMENT | Facility: OTHER | Age: 70
End: 2020-07-21

## 2020-07-21 LAB
ALBUMIN SERPL BCP-MCNC: 2.8 G/DL (ref 3.2–4.9)
ALBUMIN/GLOB SERPL: 1 G/DL
ALP SERPL-CCNC: 69 U/L (ref 30–99)
ALT SERPL-CCNC: 101 U/L (ref 2–50)
ANION GAP SERPL CALC-SCNC: 12 MMOL/L (ref 7–16)
AST SERPL-CCNC: 51 U/L (ref 12–45)
BILIRUB SERPL-MCNC: 0.4 MG/DL (ref 0.1–1.5)
BUN SERPL-MCNC: 51 MG/DL (ref 8–22)
CALCIUM SERPL-MCNC: 9 MG/DL (ref 8.5–10.5)
CHLORIDE SERPL-SCNC: 92 MMOL/L (ref 96–112)
CO2 SERPL-SCNC: 32 MMOL/L (ref 20–33)
CREAT SERPL-MCNC: 2.8 MG/DL (ref 0.5–1.4)
ERYTHROCYTE [DISTWIDTH] IN BLOOD BY AUTOMATED COUNT: 48.8 FL (ref 35.9–50)
GLOBULIN SER CALC-MCNC: 2.9 G/DL (ref 1.9–3.5)
GLUCOSE BLD-MCNC: 143 MG/DL (ref 65–99)
GLUCOSE BLD-MCNC: 221 MG/DL (ref 65–99)
GLUCOSE BLD-MCNC: 230 MG/DL (ref 65–99)
GLUCOSE BLD-MCNC: 98 MG/DL (ref 65–99)
GLUCOSE SERPL-MCNC: 97 MG/DL (ref 65–99)
HCT VFR BLD AUTO: 25.4 % (ref 37–47)
HGB BLD-MCNC: 8.2 G/DL (ref 12–16)
MCH RBC QN AUTO: 29.9 PG (ref 27–33)
MCHC RBC AUTO-ENTMCNC: 32.3 G/DL (ref 33.6–35)
MCV RBC AUTO: 92.7 FL (ref 81.4–97.8)
PLATELET # BLD AUTO: 175 K/UL (ref 164–446)
PMV BLD AUTO: 9.1 FL (ref 9–12.9)
POTASSIUM SERPL-SCNC: 4.7 MMOL/L (ref 3.6–5.5)
PROT SERPL-MCNC: 5.7 G/DL (ref 6–8.2)
RBC # BLD AUTO: 2.74 M/UL (ref 4.2–5.4)
SODIUM SERPL-SCNC: 136 MMOL/L (ref 135–145)
WBC # BLD AUTO: 6.4 K/UL (ref 4.8–10.8)

## 2020-07-21 PROCEDURE — A9270 NON-COVERED ITEM OR SERVICE: HCPCS | Performed by: INTERNAL MEDICINE

## 2020-07-21 PROCEDURE — 700102 HCHG RX REV CODE 250 W/ 637 OVERRIDE(OP): Performed by: INTERNAL MEDICINE

## 2020-07-21 PROCEDURE — 99232 SBSQ HOSP IP/OBS MODERATE 35: CPT | Performed by: HOSPITALIST

## 2020-07-21 PROCEDURE — A9270 NON-COVERED ITEM OR SERVICE: HCPCS | Performed by: HOSPITALIST

## 2020-07-21 PROCEDURE — 99232 SBSQ HOSP IP/OBS MODERATE 35: CPT | Performed by: INTERNAL MEDICINE

## 2020-07-21 PROCEDURE — 94640 AIRWAY INHALATION TREATMENT: CPT

## 2020-07-21 PROCEDURE — 82962 GLUCOSE BLOOD TEST: CPT | Mod: 91

## 2020-07-21 PROCEDURE — 80053 COMPREHEN METABOLIC PANEL: CPT

## 2020-07-21 PROCEDURE — 36415 COLL VENOUS BLD VENIPUNCTURE: CPT

## 2020-07-21 PROCEDURE — 94760 N-INVAS EAR/PLS OXIMETRY 1: CPT

## 2020-07-21 PROCEDURE — 85027 COMPLETE CBC AUTOMATED: CPT

## 2020-07-21 PROCEDURE — 770020 HCHG ROOM/CARE - TELE (206)

## 2020-07-21 PROCEDURE — 700102 HCHG RX REV CODE 250 W/ 637 OVERRIDE(OP): Performed by: HOSPITALIST

## 2020-07-21 RX ADMIN — FLUTICASONE PROPIONATE 88 MCG: 44 AEROSOL, METERED RESPIRATORY (INHALATION) at 06:59

## 2020-07-21 RX ADMIN — AMLODIPINE BESYLATE 5 MG: 5 TABLET ORAL at 05:12

## 2020-07-21 RX ADMIN — LEVOTHYROXINE SODIUM 75 MCG: 75 TABLET ORAL at 05:12

## 2020-07-21 RX ADMIN — OXYCODONE HYDROCHLORIDE 10 MG: 10 TABLET ORAL at 19:43

## 2020-07-21 RX ADMIN — FUROSEMIDE 80 MG: 40 TABLET ORAL at 17:46

## 2020-07-21 RX ADMIN — TRAZODONE HYDROCHLORIDE 300 MG: 150 TABLET ORAL at 21:13

## 2020-07-21 RX ADMIN — INSULIN HUMAN 2 UNITS: 100 INJECTION, SOLUTION PARENTERAL at 19:48

## 2020-07-21 RX ADMIN — ROSUVASTATIN CALCIUM 10 MG: 20 TABLET, FILM COATED ORAL at 17:46

## 2020-07-21 RX ADMIN — DULOXETINE HYDROCHLORIDE 60 MG: 60 CAPSULE, DELAYED RELEASE ORAL at 05:12

## 2020-07-21 RX ADMIN — OXYCODONE HYDROCHLORIDE 10 MG: 10 TABLET ORAL at 05:12

## 2020-07-21 RX ADMIN — TRAZODONE HYDROCHLORIDE 300 MG: 150 TABLET ORAL at 05:12

## 2020-07-21 RX ADMIN — INSULIN HUMAN 2 UNITS: 100 INJECTION, SOLUTION PARENTERAL at 17:44

## 2020-07-21 RX ADMIN — FUROSEMIDE 80 MG: 40 TABLET ORAL at 05:12

## 2020-07-21 RX ADMIN — UMECLIDINIUM BROMIDE AND VILANTEROL TRIFENATATE 1 PUFF: 62.5; 25 POWDER RESPIRATORY (INHALATION) at 06:59

## 2020-07-21 ASSESSMENT — ENCOUNTER SYMPTOMS
EYE PAIN: 0
SORE THROAT: 0
MYALGIAS: 0
EYE DISCHARGE: 0
FEVER: 0
WEAKNESS: 1
LOSS OF CONSCIOUSNESS: 0
SHORTNESS OF BREATH: 0
NECK PAIN: 0
COUGH: 0
FOCAL WEAKNESS: 0
NERVOUS/ANXIOUS: 0
DIZZINESS: 0
EYE REDNESS: 0
HEADACHES: 0
CLAUDICATION: 0
CHILLS: 0
CONSTIPATION: 1
ABDOMINAL PAIN: 0

## 2020-07-21 ASSESSMENT — FIBROSIS 4 INDEX: FIB4 SCORE: 2.03

## 2020-07-21 NOTE — PROGRESS NOTES
Nephrology Daily Progress Note    Date of Service  7/21/2020    Chief Complaint  69 y.o. female with a history of CKD 3, COPD who presented 6/30/2020 with shortness of breath, found to have bradycardia, hypotension, and HAN.     Interval Problem Update  7/1 -patient had dialysis yesterday with no fluid removed, and resolution of shock on completion of dialysis.  Patient had 500 mL of urine output documented last 24 hours.  Patient had dialysis again this morning with 1 L removed.  Patient says her breathing is better.  Denies chest pain, shortness of breath at this time.  7/3 -patient had third session of dialysis yesterday with 2 L removed.  Tolerated well.  Denies chest pain this morning.  Complains of continued shortness of breath, but improved from admission.  7/5 -patient had dialysis yesterday with 1.5 L removed.  Patient says she is urinating, but no urine output being recorded.  Patient says her shortness of breath is still there, but better compared to admission.  Patient denies chest pain.  7/6 -doing better, less SOB  Scheduled for HD TTS  7/9 -doing well, improved SOB  UOP 1500 cc/24 H  Holding dialysis -watching for recovery  7/10 -doing well  Edema improving  SOB better  Good UOP  Holding dialysis  7/11 -doing well, no complaints  SOB and edema improved  Good UOP  Off HD  7/12 -no acute events, no complaints  Good UOP  Creat improving  Off HD  7/13 patient is doing better , still dyspnea on exertion , no chest pain, creatinine is worse.  7/14 patient is complaining of back pain, no chest pain  7/15 patient refused diuretic therapy at this morning  7/16 patient is doing better today, no chest pain no shortness of breath  7/17 patient has no chest pain no shortness of breath  7/18 pt is doing better, still low Po2 when moving  7/19 pt is doing about the same  7/20 -urine output not recorded, but appears to have had 500 mL urine output since 7 AM.  Patient says her shortness of breath is improved, denies  chest pain.  7/21 -  milliliters in last 24 hours.  Patient says her breathing is better.  Denies chest pain.    Review of Systems  Review of Systems   Constitutional: Negative for fever.   Respiratory: Negative for shortness of breath.    Cardiovascular: Negative for chest pain.   Gastrointestinal: Negative for abdominal pain.   All other systems reviewed and are negative.       Physical Exam  Temp:  [36.2 °C (97.1 °F)-36.9 °C (98.4 °F)] 36.4 °C (97.6 °F)  Pulse:  [58-66] 66  Resp:  [17-18] 18  BP: (105-164)/(46-69) 105/62  SpO2:  [96 %-99 %] 96 %    Physical Exam   Constitutional: She is oriented to person, place, and time. She appears well-developed. No distress.   HENT:   Mouth/Throat: Oropharynx is clear and moist. No oropharyngeal exudate.   Eyes: EOM are normal. No scleral icterus.   Neck: No tracheal deviation present.   Cardiovascular: Normal heart sounds.   No murmur heard.  Pulmonary/Chest: Effort normal. No stridor. She has no rales.   Abdominal: Soft. There is no abdominal tenderness.   Musculoskeletal: Normal range of motion.         General: Edema (Trace bilateral LE) present.   Neurological: She is alert and oriented to person, place, and time.   Skin: Skin is warm and dry.   Psychiatric: She has a normal mood and affect.   Dialysis access: None    Fluids    Intake/Output Summary (Last 24 hours) at 7/21/2020 1330  Last data filed at 7/21/2020 0600  Gross per 24 hour   Intake 240 ml   Output 900 ml   Net -660 ml       Laboratory  Labs reviewed, pertinent labs below.  Recent Labs     07/19/20  0333 07/20/20  0348 07/20/20  1207 07/21/20  0416   WBC 6.1 5.6  --  6.4   RBC 2.44* 2.24*  --  2.74*   HEMOGLOBIN 7.3* 6.8* 8.6* 8.2*   HEMATOCRIT 23.3* 21.1* 26.2* 25.4*   MCV 95.5 94.2  --  92.7   MCH 29.9 30.4  --  29.9   MCHC 31.3* 32.2*  --  32.3*   RDW 48.6 47.1  --  48.8   PLATELETCT 141* 149*  --  175   MPV 9.0 9.1  --  9.1     Recent Labs     07/19/20  0333 07/20/20  0348 07/21/20  0416   SODIUM  137 137 136   POTASSIUM 4.1 4.3 4.7   CHLORIDE 93* 94* 92*   CO2 32 33 32   GLUCOSE 112* 119* 97   BUN 49* 49* 51*   CREATININE 2.66* 2.77* 2.80*   CALCIUM 8.6 8.5 9.0               URINALYSIS:  Lab Results   Component Value Date/Time    COLORURINE Yellow 07/04/2020 0039    CLARITY Clear 07/04/2020 0039    SPECGRAVITY 1.011 07/04/2020 0039    PHURINE 5.0 07/04/2020 0039    KETONES Negative 07/04/2020 0039    PROTEINURIN 100 (A) 07/04/2020 0039    BILIRUBINUR Negative 07/04/2020 0039    UROBILU 0.2 07/04/2020 0039    NITRITE Negative 07/04/2020 0039    LEUKESTERAS Negative 07/04/2020 0039    OCCULTBLOOD Small (A) 07/04/2020 0039     UPC  No results found for: TOTPROTUR No results found for: CREATININEU      Imaging reviewed  IR-US GUIDED PIV   Final Result    Ultrasound-guided PERIPHERAL IV INSERTION performed by    qualified nursing staff as above.      DX-CHEST-PORTABLE (1 VIEW)   Final Result         No significant interval change.      Unchanged diffuse interstitial prominence.      EC-ECHOCARDIOGRAM COMPLETE W/O CONT   Final Result      US-RUQ   Final Result      Cholelithiasis with mild gallbladder wall thickening but no positive sonographic Troncoso sign. This could indicate chronic cholecystitis or incomplete distention      DX-CHEST-PORTABLE (1 VIEW)   Final Result      1.  Satisfactory position of new right IJV multilumen dialysis catheter. No pneumothorax identified.      2.  Unchanged mild volume overload pattern.      DX-CHEST-PORTABLE (1 VIEW)   Final Result      No acute cardiac or pulmonary abnormality is noted. Stable cardiomegaly with increased interstitial opacity diffusely.            Current Facility-Administered Medications   Medication Dose Route Frequency Provider Last Rate Last Dose   • nystatin (MYCOSTATIN) cream   Topical BID MARLO Oliveira       • furosemide (LASIX) tablet 80 mg  80 mg Oral BID DIURETIC Fadi Najjar, M.D.   80 mg at 07/21/20 0512   • fluticasone (FLOVENT HFA) 44  MCG/ACT inhaler 88 mcg  2 Puff Inhalation QDAILY (RT) Roger Florence Jr., D.O.   88 mcg at 07/21/20 0659   • amLODIPine (NORVASC) tablet 5 mg  5 mg Oral Q DAY ROBY Dimas.O.   5 mg at 07/21/20 0512   • ipratropium-albuterol (DUONEB) nebulizer solution  3 mL Nebulization Q2HRS PRN (RT) Christina Calderon M.D.       • hydrALAZINE (APRESOLINE) tablet 25 mg  25 mg Oral Q8HRS PRN Christina Calderon M.D.   25 mg at 07/10/20 1647   • oxyCODONE immediate release (ROXICODONE) tablet 10 mg  10 mg Oral TID PRN Christina Calderon M.D.   10 mg at 07/21/20 0512   • lactulose 20 GM/30ML solution 30 mL  30 mL Oral BID W/MEALS PRN Heber Armstrong M.D.       • NS (BOLUS) infusion 250 mL  250 mL Intravenous DIALYSIS PRN Eric Pompa M.D.       • Respiratory Therapy Consult   Nebulization Continuous RT Roger Florence Jr., D.O.       • DULoxetine (CYMBALTA) capsule 60 mg  60 mg Oral DAILY Roger Florence Jr., D.O.   60 mg at 07/21/20 0512   • hydrOXYzine HCl (ATARAX) tablet 25 mg  25 mg Oral TID PRN Roger Florence Jr., D.O.   25 mg at 07/10/20 1642   • levothyroxine (SYNTHROID) tablet 75 mcg  75 mcg Oral AM ES Roger Florence Jr., D.O.   75 mcg at 07/21/20 0512   • lidocaine (LIDODERM) 5 % 1 Patch  1 Patch Transdermal Q24HRS PRN Roger Florence Jr., D.O.   1 Patch at 07/04/20 2211   • traZODone (DESYREL) tablet 300 mg  300 mg Oral BID Roger Florence Jr., D.O.   300 mg at 07/21/20 0512   • rosuvastatin (CRESTOR) tablet 10 mg  10 mg Oral Q EVENING Roger Florence Jr., D.O.   10 mg at 07/20/20 1638   • senna-docusate (PERICOLACE or SENOKOT S) 8.6-50 MG per tablet 2 Tab  2 Tab Oral BID ROBY Sarmiento Jr..O.   2 Tab at 07/16/20 0601    And   • polyethylene glycol/lytes (MIRALAX) PACKET 1 Packet  1 Packet Oral QDAY PRN ROBY Sarmiento Jr..O.   1 Packet at 07/02/20 0500    And   • magnesium hydroxide (MILK OF MAGNESIA) suspension 30 mL  30 mL Oral QDAY PRN ROBY Sarmiento Jr..O.        And   • bisacodyl (DULCOLAX) suppository 10 mg  10 mg  Rectal QDAY PRN ROBY Sarmiento Jr..O.       • ondansetron (ZOFRAN) syringe/vial injection 4 mg  4 mg Intravenous Q4HRS PRN Roger Florence Jr., D.O.       • ondansetron (ZOFRAN ODT) dispertab 4 mg  4 mg Oral Q4HRS PRN Roger Florence Jr., D.O.       • Pharmacy Consult Request ...Pain Management Review 1 Each  1 Each Other PHARMACY TO DOSE ROBY Sarmiento Jr..LANCE.       • lactated ringers infusion (BOLUS)  500 mL Intravenous Once PRN Roger Florence Jr., D.O.       • umeclidinium-vilanterol (ANORO ELLIPTA) inhaler 1 Puff  1 Puff Inhalation QDAILY (RT) Roger Florence Jr., D.O.   1 Puff at 07/21/20 0659   • insulin regular (HUMULIN R) injection 1-6 Units  1-6 Units Subcutaneous 4X/DAY ACHS Bob Glover M.D.   Stopped at 07/21/20 0700    And   • glucose 4 g chewable tablet 16 g  16 g Oral Q15 MIN PRN Bob Glover M.D.        And   • dextrose 50% (D50W) injection 50 mL  50 mL Intravenous Q15 MIN PRN Bob Glover M.D.             Assessment/Plan  69 y.o. female with a history of CKD 3, COPD who presented 6/30/2020 with shortness of breath, found to have bradycardia, hypotension, and HAN.    1.  HAN on CKD stage III.  Nonoliguric.  Required dialysis from 6/30/2020 through 7/8/2020.  Her previous baseline creatinine ran between 1.8 and 2.2.  On admission, creatinine was up to 5.7.  HAN was likely due to ATN from cardiogenic shock on admission.  She might have progressed to CKD stage IV.  No acute need for dialysis.  Avoid nephrotoxins.  Check labs daily.  Continue diuretics.    2.  Shortness of breath, improving.  Continue Lasix 80 mg p.o. twice daily.    3.  Azotemia, slightly worsening, but no signs or symptoms of uremia.  No need for dialysis.  Check labs daily.    4.  Cardiogenic shock, resolved.    5.  Normocytic anemia, worsening, required blood transfusion on 7/20/2020.  Unclear etiology of worsening anemia.  Check CBC daily.    6.  Thrombocytopenia, improved.  Check CBC daily.    7.   Hypertension, controlled.    Following    Eric Pompa MD  Nephrology

## 2020-07-21 NOTE — PROGRESS NOTES
Hospital Medicine Daily Progress Note    Date of Service  7/21/2020    Chief Complaint  69 y.o. female admitted 6/30/2020 with junctional bradycardia rate 37 with cardiogenic shock, SOB.    Hospital Course        70 yo female COPD on 4 LPM NC at home, DM, hypothyroidism, hepatitis C, HTN, CHF, CKD stage 4 followed by Dr. Nava presented with worsening SOB, hypoxia and cough admitted by critical care to on 6/30 ICU for HR 37 junctional rhythm, She was recently admitted 6/15/20 for COPD exacerbation and pulmonary edema, given amlodopine, metoprolol and lasix.   COVID 19 negative x3.  K was elevated 6.3.  Nephrology consulted and started HD with improvement of HR, BP and SOB.  3 HD sessions performed including today with 2 L removed.    S/P IV & PO steroids  Cr improving,   Anemia requiring transfusion,           Interval update  Feeling a bit better, still requiring 5 L of oxygen to achieve adequate saturation, encourage incentive spirometry.  Constipated, advance bowel protocol, discussed with nursing.  Hemoglobin 8.2, has been stable continue to monitor, transfuse for hemoglobin of less than or equal to 7.  Folic acid and B12 within normal limits  Last iron check within normal limits.  Anemia likely secondary to chronic kidney disease, versus insensible GI losses, will check occult blood will start a trial erythropoietin.  Discussed with patient, patient's nurse and with multidisciplinary team during rounds including , pharmacist and charge nurse.      Consultants/Specialty  Nephrology   Critical care    Code Status  DNAR, I ok    Disposition  TBD     Review of Systems  Review of Systems   Constitutional: Negative for chills and fever.   HENT: Negative for sore throat.    Eyes: Negative for pain, discharge and redness.   Respiratory: Negative for cough and shortness of breath.    Cardiovascular: Positive for leg swelling. Negative for chest pain and claudication.   Gastrointestinal: Positive for  constipation. Negative for abdominal pain.   Genitourinary: Negative for dysuria.   Musculoskeletal: Negative for myalgias and neck pain.   Neurological: Positive for weakness. Negative for dizziness, focal weakness, loss of consciousness and headaches.   Psychiatric/Behavioral: The patient is not nervous/anxious.       Physical Exam  Temp:  [36.2 °C (97.1 °F)-36.9 °C (98.4 °F)] 36.4 °C (97.6 °F)  Pulse:  [58-66] 66  Resp:  [17-18] 18  BP: (105-164)/(46-69) 105/62  SpO2:  [96 %-99 %] 96 %    Physical Exam  Vitals signs and nursing note reviewed.   Constitutional:       General: She is not in acute distress.     Appearance: She is well-developed. She is not ill-appearing or toxic-appearing.   HENT:      Head: Normocephalic and atraumatic.      Nose: Nose normal.      Mouth/Throat:      Pharynx: No oropharyngeal exudate.   Eyes:      Extraocular Movements: Extraocular movements intact.      Pupils: Pupils are equal, round, and reactive to light.   Neck:      Musculoskeletal: Normal range of motion and neck supple.      Thyroid: No thyromegaly.      Vascular: No JVD.      Trachea: No tracheal deviation.   Cardiovascular:      Rate and Rhythm: Normal rate.      Pulses: Normal pulses.   Pulmonary:      Effort: Pulmonary effort is normal. No respiratory distress.      Breath sounds: Normal breath sounds. No wheezing.   Abdominal:      General: Bowel sounds are normal. There is no distension.      Palpations: Abdomen is soft.      Comments: Protuberant   Musculoskeletal: Normal range of motion.         General: Swelling present. No tenderness.   Skin:     General: Skin is warm and dry.      Capillary Refill: Capillary refill takes 2 to 3 seconds.      Coloration: Skin is not pale.   Neurological:      Mental Status: She is alert and oriented to person, place, and time.      Cranial Nerves: No cranial nerve deficit.      Motor: Weakness present. No abnormal muscle tone.   Psychiatric:         Thought Content: Thought  content normal.       Fluids    Intake/Output Summary (Last 24 hours) at 7/21/2020 1540  Last data filed at 7/21/2020 0600  Gross per 24 hour   Intake 240 ml   Output 900 ml   Net -660 ml     Laboratory  Recent Labs     07/19/20  0333 07/20/20  0348 07/20/20  1207 07/21/20  0416   WBC 6.1 5.6  --  6.4   RBC 2.44* 2.24*  --  2.74*   HEMOGLOBIN 7.3* 6.8* 8.6* 8.2*   HEMATOCRIT 23.3* 21.1* 26.2* 25.4*   MCV 95.5 94.2  --  92.7   MCH 29.9 30.4  --  29.9   MCHC 31.3* 32.2*  --  32.3*   RDW 48.6 47.1  --  48.8   PLATELETCT 141* 149*  --  175   MPV 9.0 9.1  --  9.1     Recent Labs     07/19/20  0333 07/20/20  0348 07/21/20  0416   SODIUM 137 137 136   POTASSIUM 4.1 4.3 4.7   CHLORIDE 93* 94* 92*   CO2 32 33 32   GLUCOSE 112* 119* 97   BUN 49* 49* 51*   CREATININE 2.66* 2.77* 2.80*   CALCIUM 8.6 8.5 9.0                   Imaging  IR-US GUIDED PIV   Final Result    Ultrasound-guided PERIPHERAL IV INSERTION performed by    qualified nursing staff as above.      DX-CHEST-PORTABLE (1 VIEW)   Final Result         No significant interval change.      Unchanged diffuse interstitial prominence.      EC-ECHOCARDIOGRAM COMPLETE W/O CONT   Final Result      US-RUQ   Final Result      Cholelithiasis with mild gallbladder wall thickening but no positive sonographic Troncoso sign. This could indicate chronic cholecystitis or incomplete distention      DX-CHEST-PORTABLE (1 VIEW)   Final Result      1.  Satisfactory position of new right IJV multilumen dialysis catheter. No pneumothorax identified.      2.  Unchanged mild volume overload pattern.      DX-CHEST-PORTABLE (1 VIEW)   Final Result      No acute cardiac or pulmonary abnormality is noted. Stable cardiomegaly with increased interstitial opacity diffusely.         Assessment/Plan  Hyperkalemia- (present on admission)  Assessment & Plan  Improved w lasix   Monitor     Acute renal failure superimposed on stage 4 chronic kidney disease (HCC)- (present on admission)  Assessment &  Plan  Had HD emergently x 3 with improvement of SOB, 1 L off yesterday, 2 L off today.  Nephrology following  Currently off dialysis as no need at this time, Nephrology following appreciate rec.     Pneumonia due to infectious organism  Assessment & Plan  Had been treated with 5 days rocephin, 3 days zithromax since admission.  CXR improved edema, no definite infiltrate to suggest pneumonia  Cultures neg  No need for abx     Leukocytosis- (present on admission)  Assessment & Plan  2/2 steroids.    Acute exacerbation of chronic obstructive pulmonary disease (COPD) (HCC)- (present on admission)  Assessment & Plan  Patient admitted with COPD exacerbation  S/p IV & PO steroids  Wean of O2 as tolerated     DM type 2, uncontrolled, with renal complications (HCC)- (present on admission)  Assessment & Plan  SSI  Diabetic, renal diet.    UTI (urinary tract infection) due to Enterococcus  Assessment & Plan  VRE on urine culture ss zyvox  S/p zyvox 7/13    Renovascular hypertension  Assessment & Plan  Ordered PRN BP meds for sbp >160 or DBP >90 hydralazine.  Monitor  Avoid BB since she went into cardiogenic shock 2/2 bradycardia.    Chronic hepatitis C without hepatic coma (HCC)- (present on admission)  Assessment & Plan  Will need outpatient treatment.  Elevated LFTs.    Junctional bradycardia- (present on admission)  Assessment & Plan  Combination of hyperkalemia, acute on CKD4 and metoprolol, norvasc  Resolved s/p HD.     Acute on chronic respiratory failure with hypoxia (HCC)- (present on admission)  Assessment & Plan  Initially admitted to ICU for respiratory support.  No intubation is noted in the record.    Anemia- (present on admission)  Assessment & Plan  2/2 CKD stage 4.  S/p transfusions  Folic acid and B12 within normal limits  Last iron check within normal limits.  Anemia likely secondary to chronic kidney disease, versus small GI losses, will check occult blood      Pulmonary hypertension (HCC)- (present on  admission)  Assessment & Plan  EF 80%  RVP elevated 55-60%.  Likely will need some diuretic for pulmonary edema.     Hyponatremia  Assessment & Plan  7/12 - ARF, on HD per nephrology  Monitor     Tobacco dependence- (present on admission)  Assessment & Plan  Recommend cessation    Chronic use of opiate drug for therapeutic purpose- (present on admission)  Assessment & Plan  On oxycodone 10 mg tid at home, continued in hospital    EDITH (obstructive sleep apnea)- (present on admission)  Assessment & Plan  ongoing    Hypothyroidism- (present on admission)  Assessment & Plan  Continue home meds.    Essential hypertension- (present on admission)  Assessment & Plan  Uncontrolled, with intermittent hypotension with HD  Norvasc with holding parameters  Improving      VTE prophylaxis: SCDs, Sever anemia

## 2020-07-21 NOTE — CARE PLAN
Problem: Safety  Goal: Will remain free from injury  7/21/2020 1124 by Lopez Evans R.N.  Outcome: PROGRESSING AS EXPECTED  7/21/2020 1124 by Lopez Evans R.N.  Outcome: PROGRESSING AS EXPECTED  Goal: Will remain free from falls  7/21/2020 1124 by Lopez Evans R.N.  Outcome: PROGRESSING AS EXPECTED  7/21/2020 1124 by Lopez Evans R.N.  Outcome: PROGRESSING AS EXPECTED     Problem: Infection  Goal: Will remain free from infection  7/21/2020 1124 by Lopez Evans R.N.  Outcome: PROGRESSING AS EXPECTED  7/21/2020 1124 by Lopez Evans R.N.  Outcome: PROGRESSING AS EXPECTED     Problem: Venous Thromboembolism (VTW)/Deep Vein Thrombosis (DVT) Prevention:  Goal: Patient will participate in Venous Thrombosis (VTE)/Deep Vein Thrombosis (DVT)Prevention Measures  7/21/2020 1124 by Lopez Evans R.N.  Outcome: PROGRESSING AS EXPECTED  7/21/2020 1124 by Lopez Evans R.N.  Outcome: PROGRESSING AS EXPECTED     Problem: Knowledge Deficit  Goal: Knowledge of disease process/condition, treatment plan, diagnostic tests, and medications will improve  7/21/2020 1124 by Lopez Evans R.N.  Outcome: PROGRESSING AS EXPECTED  7/21/2020 1124 by Lopez Evans R.N.  Outcome: PROGRESSING AS EXPECTED  Goal: Knowledge of the prescribed therapeutic regimen will improve  7/21/2020 1124 by Lopez Evans R.N.  Outcome: PROGRESSING AS EXPECTED  7/21/2020 1124 by Lopez Evans R.N.  Outcome: PROGRESSING AS EXPECTED     Problem: Discharge Barriers/Planning  Goal: Patient's continuum of care needs will be met  7/21/2020 1124 by Lopez Evans R.N.  Outcome: PROGRESSING AS EXPECTED  7/21/2020 1124 by Lopez Evans R.N.  Outcome: PROGRESSING AS EXPECTED     Problem: Pain Management  Goal: Pain level will decrease to patient's comfort goal  7/21/2020 1124 by Lopez Evans R.N.  Outcome: PROGRESSING AS EXPECTED  7/21/2020 1124 by Lopez Evans R.N.  Outcome: PROGRESSING AS EXPECTED     Problem: Respiratory:  Goal: Respiratory status will  improve  7/21/2020 1124 by Lopez Evans R.N.  Outcome: PROGRESSING AS EXPECTED  7/21/2020 1124 by Lopez Evans R.N.  Outcome: PROGRESSING AS EXPECTED     Problem: Fluid Volume:  Goal: Will maintain balanced intake and output  7/21/2020 1124 by Lopez Evans R.N.  Outcome: PROGRESSING AS EXPECTED  7/21/2020 1124 by Lopez Evans R.N.  Outcome: PROGRESSING AS EXPECTED     Problem: Psychosocial Needs:  Goal: Level of anxiety will decrease  7/21/2020 1124 by Lopez Evans R.N.  Outcome: PROGRESSING AS EXPECTED  7/21/2020 1124 by Lopez Evans R.N.  Outcome: PROGRESSING AS EXPECTED     Problem: Mobility  Goal: Risk for activity intolerance will decrease  7/21/2020 1124 by Lopez Evans R.N.  Outcome: PROGRESSING AS EXPECTED  7/21/2020 1124 by Lopez Evans R.N.  Outcome: PROGRESSING AS EXPECTED     Problem: Skin Integrity  Goal: Risk for impaired skin integrity will decrease  7/21/2020 1124 by Lopez Evans R.N.  Outcome: PROGRESSING AS EXPECTED  7/21/2020 1124 by Lopez Evans R.N.  Outcome: PROGRESSING AS EXPECTED     Problem: Urinary Elimination:  Goal: Ability to reestablish a normal urinary elimination pattern will improve  7/21/2020 1124 by Lopez Evans R.N.  Outcome: PROGRESSING AS EXPECTED  7/21/2020 1124 by Lopez Evans R.N.  Outcome: PROGRESSING AS EXPECTED     Problem: Bowel/Gastric:  Goal: Normal bowel function is maintained or improved  7/21/2020 1124 by Lopez Evans R.N.  Outcome: PROGRESSING SLOWER THAN EXPECTED  7/21/2020 1124 by Lopez Evans R.N.  Outcome: PROGRESSING AS EXPECTED  Goal: Will not experience complications related to bowel motility  7/21/2020 1124 by Lopez Evans R.N.  Outcome: PROGRESSING SLOWER THAN EXPECTED  7/21/2020 1124 by Lopez Evans R.N.  Outcome: PROGRESSING AS EXPECTED    Will give Miralax in attempt to improve bowel function   Update: patient refuses despite not having bowel movement since 7/15.

## 2020-07-22 VITALS
OXYGEN SATURATION: 96 % | BODY MASS INDEX: 36.62 KG/M2 | WEIGHT: 219.8 LBS | RESPIRATION RATE: 16 BRPM | DIASTOLIC BLOOD PRESSURE: 62 MMHG | TEMPERATURE: 97.4 F | HEIGHT: 65 IN | SYSTOLIC BLOOD PRESSURE: 100 MMHG | HEART RATE: 61 BPM

## 2020-07-22 DIAGNOSIS — Z79.4 TYPE 2 DIABETES MELLITUS WITH HYPERGLYCEMIA, WITH LONG-TERM CURRENT USE OF INSULIN (HCC): ICD-10-CM

## 2020-07-22 DIAGNOSIS — E11.65 TYPE 2 DIABETES MELLITUS WITH HYPERGLYCEMIA, WITH LONG-TERM CURRENT USE OF INSULIN (HCC): ICD-10-CM

## 2020-07-22 DIAGNOSIS — N17.9 AKI (ACUTE KIDNEY INJURY) (HCC): ICD-10-CM

## 2020-07-22 DIAGNOSIS — D64.9 ANEMIA, UNSPECIFIED TYPE: ICD-10-CM

## 2020-07-22 PROBLEM — D72.829 LEUKOCYTOSIS: Status: RESOLVED | Noted: 2017-04-04 | Resolved: 2020-07-22

## 2020-07-22 PROBLEM — E87.5 HYPERKALEMIA: Status: RESOLVED | Noted: 2020-06-15 | Resolved: 2020-07-22

## 2020-07-22 PROBLEM — R00.1 JUNCTIONAL BRADYCARDIA: Status: RESOLVED | Noted: 2020-06-30 | Resolved: 2020-07-22

## 2020-07-22 PROBLEM — N39.0 UTI (URINARY TRACT INFECTION) DUE TO ENTEROCOCCUS: Status: RESOLVED | Noted: 2020-07-06 | Resolved: 2020-07-22

## 2020-07-22 PROBLEM — B95.2 UTI (URINARY TRACT INFECTION) DUE TO ENTEROCOCCUS: Status: RESOLVED | Noted: 2020-07-06 | Resolved: 2020-07-22

## 2020-07-22 PROBLEM — E87.1 HYPONATREMIA: Status: RESOLVED | Noted: 2017-03-08 | Resolved: 2020-07-22

## 2020-07-22 PROBLEM — I27.20 PULMONARY HTN (HCC): Status: RESOLVED | Noted: 2019-04-16 | Resolved: 2020-07-22

## 2020-07-22 LAB
ANION GAP SERPL CALC-SCNC: 11 MMOL/L (ref 7–16)
BASOPHILS # BLD AUTO: 0.6 % (ref 0–1.8)
BASOPHILS # BLD: 0.04 K/UL (ref 0–0.12)
BUN SERPL-MCNC: 51 MG/DL (ref 8–22)
CALCIUM SERPL-MCNC: 9 MG/DL (ref 8.5–10.5)
CHLORIDE SERPL-SCNC: 94 MMOL/L (ref 96–112)
CO2 SERPL-SCNC: 33 MMOL/L (ref 20–33)
CREAT SERPL-MCNC: 2.83 MG/DL (ref 0.5–1.4)
EOSINOPHIL # BLD AUTO: 0.18 K/UL (ref 0–0.51)
EOSINOPHIL NFR BLD: 2.9 % (ref 0–6.9)
ERYTHROCYTE [DISTWIDTH] IN BLOOD BY AUTOMATED COUNT: 47 FL (ref 35.9–50)
FERRITIN SERPL-MCNC: 2003 NG/ML (ref 10–291)
GLUCOSE BLD-MCNC: 109 MG/DL (ref 65–99)
GLUCOSE BLD-MCNC: 163 MG/DL (ref 65–99)
GLUCOSE BLD-MCNC: 174 MG/DL (ref 65–99)
GLUCOSE BLD-MCNC: 242 MG/DL (ref 65–99)
GLUCOSE BLD-MCNC: 98 MG/DL (ref 65–99)
GLUCOSE SERPL-MCNC: 101 MG/DL (ref 65–99)
HCT VFR BLD AUTO: 25.7 % (ref 37–47)
HGB BLD-MCNC: 8.2 G/DL (ref 12–16)
IMM GRANULOCYTES # BLD AUTO: 0.09 K/UL (ref 0–0.11)
IMM GRANULOCYTES NFR BLD AUTO: 1.4 % (ref 0–0.9)
IRON SATN MFR SERPL: 26 % (ref 15–55)
IRON SERPL-MCNC: 53 UG/DL (ref 40–170)
LYMPHOCYTES # BLD AUTO: 1.26 K/UL (ref 1–4.8)
LYMPHOCYTES NFR BLD: 20 % (ref 22–41)
MAGNESIUM SERPL-MCNC: 1.8 MG/DL (ref 1.5–2.5)
MCH RBC QN AUTO: 29.8 PG (ref 27–33)
MCHC RBC AUTO-ENTMCNC: 31.9 G/DL (ref 33.6–35)
MCV RBC AUTO: 93.5 FL (ref 81.4–97.8)
MONOCYTES # BLD AUTO: 0.64 K/UL (ref 0–0.85)
MONOCYTES NFR BLD AUTO: 10.2 % (ref 0–13.4)
NEUTROPHILS # BLD AUTO: 4.08 K/UL (ref 2–7.15)
NEUTROPHILS NFR BLD: 64.9 % (ref 44–72)
NRBC # BLD AUTO: 0 K/UL
NRBC BLD-RTO: 0 /100 WBC
PLATELET # BLD AUTO: 211 K/UL (ref 164–446)
PMV BLD AUTO: 9.4 FL (ref 9–12.9)
POTASSIUM SERPL-SCNC: 4.4 MMOL/L (ref 3.6–5.5)
RBC # BLD AUTO: 2.75 M/UL (ref 4.2–5.4)
SODIUM SERPL-SCNC: 138 MMOL/L (ref 135–145)
TIBC SERPL-MCNC: 201 UG/DL (ref 250–450)
UIBC SERPL-MCNC: 148 UG/DL (ref 110–370)
WBC # BLD AUTO: 6.3 K/UL (ref 4.8–10.8)

## 2020-07-22 PROCEDURE — A9270 NON-COVERED ITEM OR SERVICE: HCPCS | Performed by: HOSPITALIST

## 2020-07-22 PROCEDURE — 700102 HCHG RX REV CODE 250 W/ 637 OVERRIDE(OP): Performed by: INTERNAL MEDICINE

## 2020-07-22 PROCEDURE — 36415 COLL VENOUS BLD VENIPUNCTURE: CPT

## 2020-07-22 PROCEDURE — 82962 GLUCOSE BLOOD TEST: CPT

## 2020-07-22 PROCEDURE — 83540 ASSAY OF IRON: CPT

## 2020-07-22 PROCEDURE — 99232 SBSQ HOSP IP/OBS MODERATE 35: CPT | Performed by: INTERNAL MEDICINE

## 2020-07-22 PROCEDURE — 83550 IRON BINDING TEST: CPT

## 2020-07-22 PROCEDURE — 700102 HCHG RX REV CODE 250 W/ 637 OVERRIDE(OP): Performed by: HOSPITALIST

## 2020-07-22 PROCEDURE — 700111 HCHG RX REV CODE 636 W/ 250 OVERRIDE (IP): Mod: TB | Performed by: INTERNAL MEDICINE

## 2020-07-22 PROCEDURE — 82728 ASSAY OF FERRITIN: CPT

## 2020-07-22 PROCEDURE — 99239 HOSP IP/OBS DSCHRG MGMT >30: CPT | Performed by: HOSPITALIST

## 2020-07-22 PROCEDURE — 83735 ASSAY OF MAGNESIUM: CPT

## 2020-07-22 PROCEDURE — 94640 AIRWAY INHALATION TREATMENT: CPT

## 2020-07-22 PROCEDURE — 80048 BASIC METABOLIC PNL TOTAL CA: CPT

## 2020-07-22 PROCEDURE — A9270 NON-COVERED ITEM OR SERVICE: HCPCS | Performed by: INTERNAL MEDICINE

## 2020-07-22 PROCEDURE — 85025 COMPLETE CBC W/AUTO DIFF WBC: CPT

## 2020-07-22 RX ORDER — AMLODIPINE BESYLATE 5 MG/1
5 TABLET ORAL DAILY
Qty: 30 TAB | Refills: 0 | Status: ON HOLD
Start: 2020-07-23 | End: 2020-12-03

## 2020-07-22 RX ORDER — FUROSEMIDE 80 MG
80 TABLET ORAL 2 TIMES DAILY
Qty: 60 TAB | Refills: 0 | Status: ON HOLD
Start: 2020-07-22 | End: 2020-12-03

## 2020-07-22 RX ORDER — LEVOTHYROXINE SODIUM 0.07 MG/1
75 TABLET ORAL
Qty: 30 TAB | Refills: 0 | Status: SHIPPED | OUTPATIENT
Start: 2020-07-23

## 2020-07-22 RX ADMIN — LEVOTHYROXINE SODIUM 75 MCG: 75 TABLET ORAL at 05:02

## 2020-07-22 RX ADMIN — FUROSEMIDE 80 MG: 40 TABLET ORAL at 05:02

## 2020-07-22 RX ADMIN — OXYCODONE HYDROCHLORIDE 10 MG: 10 TABLET ORAL at 03:38

## 2020-07-22 RX ADMIN — FLUTICASONE PROPIONATE 88 MCG: 44 AEROSOL, METERED RESPIRATORY (INHALATION) at 07:37

## 2020-07-22 RX ADMIN — UMECLIDINIUM BROMIDE AND VILANTEROL TRIFENATATE 1 PUFF: 62.5; 25 POWDER RESPIRATORY (INHALATION) at 07:37

## 2020-07-22 RX ADMIN — TRAZODONE HYDROCHLORIDE 300 MG: 150 TABLET ORAL at 05:02

## 2020-07-22 RX ADMIN — DULOXETINE HYDROCHLORIDE 60 MG: 60 CAPSULE, DELAYED RELEASE ORAL at 05:03

## 2020-07-22 RX ADMIN — DOCUSATE SODIUM 50 MG AND SENNOSIDES 8.6 MG 2 TABLET: 8.6; 5 TABLET, FILM COATED ORAL at 05:02

## 2020-07-22 RX ADMIN — AMLODIPINE BESYLATE 5 MG: 5 TABLET ORAL at 05:02

## 2020-07-22 RX ADMIN — INSULIN HUMAN 2 UNITS: 100 INJECTION, SOLUTION PARENTERAL at 12:37

## 2020-07-22 RX ADMIN — EPOETIN ALFA-EPBX 10000 UNITS: 10000 INJECTION, SOLUTION INTRAVENOUS; SUBCUTANEOUS at 13:03

## 2020-07-22 ASSESSMENT — ENCOUNTER SYMPTOMS
FEVER: 0
SHORTNESS OF BREATH: 0
ABDOMINAL PAIN: 0

## 2020-07-22 NOTE — PROGRESS NOTES
Nephrology Daily Progress Note    Date of Service  7/22/2020    Chief Complaint  69 y.o. female with a history of CKD 3, COPD who presented 6/30/2020 with shortness of breath, found to have bradycardia, hypotension, and HAN.     Interval Problem Update  7/1 -patient had dialysis yesterday with no fluid removed, and resolution of shock on completion of dialysis.  Patient had 500 mL of urine output documented last 24 hours.  Patient had dialysis again this morning with 1 L removed.  Patient says her breathing is better.  Denies chest pain, shortness of breath at this time.  7/3 -patient had third session of dialysis yesterday with 2 L removed.  Tolerated well.  Denies chest pain this morning.  Complains of continued shortness of breath, but improved from admission.  7/5 -patient had dialysis yesterday with 1.5 L removed.  Patient says she is urinating, but no urine output being recorded.  Patient says her shortness of breath is still there, but better compared to admission.  Patient denies chest pain.  7/6 -doing better, less SOB  Scheduled for HD TTS  7/9 -doing well, improved SOB  UOP 1500 cc/24 H  Holding dialysis -watching for recovery  7/10 -doing well  Edema improving  SOB better  Good UOP  Holding dialysis  7/11 -doing well, no complaints  SOB and edema improved  Good UOP  Off HD  7/12 -no acute events, no complaints  Good UOP  Creat improving  Off HD  7/13 patient is doing better , still dyspnea on exertion , no chest pain, creatinine is worse.  7/14 patient is complaining of back pain, no chest pain  7/15 patient refused diuretic therapy at this morning  7/16 patient is doing better today, no chest pain no shortness of breath  7/17 patient has no chest pain no shortness of breath  7/18 pt is doing better, still low Po2 when moving  7/19 pt is doing about the same  7/20 -urine output not recorded, but appears to have had 500 mL urine output since 7 AM.  Patient says her shortness of breath is improved, denies  chest pain.  7/21 -  milliliters in last 24 hours.  Patient says her breathing is better.  Denies chest pain.  7/22 -urine output 1.2 L in the last 24 hours.  Patient denies chest pain, shortness of breath.  Hoping to go home today.    Review of Systems  Review of Systems   Constitutional: Negative for fever.   Respiratory: Negative for shortness of breath.    Cardiovascular: Negative for chest pain.   Gastrointestinal: Negative for abdominal pain.   All other systems reviewed and are negative.       Physical Exam  Temp:  [35.8 °C (96.5 °F)-36.4 °C (97.6 °F)] 36.3 °C (97.4 °F)  Pulse:  [61-63] 61  Resp:  [16-18] 16  BP: (100-147)/(44-70) 100/62  SpO2:  [90 %-98 %] 96 %    Physical Exam   Constitutional: She is oriented to person, place, and time. She appears well-developed. No distress.   HENT:   Mouth/Throat: Oropharynx is clear and moist. No oropharyngeal exudate.   Eyes: EOM are normal. No scleral icterus.   Neck: No tracheal deviation present.   Cardiovascular: Normal heart sounds.   No murmur heard.  Pulmonary/Chest: Effort normal. No stridor. She has no rales.   Abdominal: Soft. There is no abdominal tenderness.   Musculoskeletal: Normal range of motion.         General: Edema (trace LE) present.   Neurological: She is alert and oriented to person, place, and time.   Skin: Skin is warm and dry.   Psychiatric: She has a normal mood and affect.   Dialysis access: None    Fluids    Intake/Output Summary (Last 24 hours) at 7/22/2020 1315  Last data filed at 7/22/2020 0800  Gross per 24 hour   Intake 640 ml   Output 1200 ml   Net -560 ml       Laboratory  Labs reviewed, pertinent labs below.  Recent Labs     07/20/20  0348 07/20/20  1207 07/21/20  0416 07/22/20  0359   WBC 5.6  --  6.4 6.3   RBC 2.24*  --  2.74* 2.75*   HEMOGLOBIN 6.8* 8.6* 8.2* 8.2*   HEMATOCRIT 21.1* 26.2* 25.4* 25.7*   MCV 94.2  --  92.7 93.5   MCH 30.4  --  29.9 29.8   MCHC 32.2*  --  32.3* 31.9*   RDW 47.1  --  48.8 47.0   PLATELETCT  149*  --  175 211   MPV 9.1  --  9.1 9.4     Recent Labs     07/20/20  0348 07/21/20  0416 07/22/20  0359   SODIUM 137 136 138   POTASSIUM 4.3 4.7 4.4   CHLORIDE 94* 92* 94*   CO2 33 32 33   GLUCOSE 119* 97 101*   BUN 49* 51* 51*   CREATININE 2.77* 2.80* 2.83*   CALCIUM 8.5 9.0 9.0               URINALYSIS:  Lab Results   Component Value Date/Time    COLORURINE Yellow 07/04/2020 0039    CLARITY Clear 07/04/2020 0039    SPECGRAVITY 1.011 07/04/2020 0039    PHURINE 5.0 07/04/2020 0039    KETONES Negative 07/04/2020 0039    PROTEINURIN 100 (A) 07/04/2020 0039    BILIRUBINUR Negative 07/04/2020 0039    UROBILU 0.2 07/04/2020 0039    NITRITE Negative 07/04/2020 0039    LEUKESTERAS Negative 07/04/2020 0039    OCCULTBLOOD Small (A) 07/04/2020 0039     UPC  No results found for: TOTPROTUR No results found for: CREATININEU      Imaging reviewed  IR-US GUIDED PIV   Final Result    Ultrasound-guided PERIPHERAL IV INSERTION performed by    qualified nursing staff as above.      DX-CHEST-PORTABLE (1 VIEW)   Final Result         No significant interval change.      Unchanged diffuse interstitial prominence.      EC-ECHOCARDIOGRAM COMPLETE W/O CONT   Final Result      US-RUQ   Final Result      Cholelithiasis with mild gallbladder wall thickening but no positive sonographic Troncoso sign. This could indicate chronic cholecystitis or incomplete distention      DX-CHEST-PORTABLE (1 VIEW)   Final Result      1.  Satisfactory position of new right IJV multilumen dialysis catheter. No pneumothorax identified.      2.  Unchanged mild volume overload pattern.      DX-CHEST-PORTABLE (1 VIEW)   Final Result      No acute cardiac or pulmonary abnormality is noted. Stable cardiomegaly with increased interstitial opacity diffusely.            Current Facility-Administered Medications   Medication Dose Route Frequency Provider Last Rate Last Dose   • epoetin (Retacrit) injection (Non Dialysis Use) 10,000 Units  10,000 Units Subcutaneous Q7 DAYS  Eric Pompa M.D.   10,000 Units at 07/22/20 1303   • nystatin (MYCOSTATIN) cream   Topical BID MARLO Oliveira       • furosemide (LASIX) tablet 80 mg  80 mg Oral BID DIURETIC Fadi Najjar, M.D.   80 mg at 07/22/20 0502   • fluticasone (FLOVENT HFA) 44 MCG/ACT inhaler 88 mcg  2 Puff Inhalation QDAILY (RT) Roger Florence Jr., D.O.   88 mcg at 07/22/20 0737   • amLODIPine (NORVASC) tablet 5 mg  5 mg Oral Q DAY GHULAM DimasOMelonie   5 mg at 07/22/20 0502   • ipratropium-albuterol (DUONEB) nebulizer solution  3 mL Nebulization Q2HRS PRN (RT) Christina Calderon M.D.       • hydrALAZINE (APRESOLINE) tablet 25 mg  25 mg Oral Q8HRS PRN Christina Calderon M.D.   25 mg at 07/10/20 1647   • oxyCODONE immediate release (ROXICODONE) tablet 10 mg  10 mg Oral TID PRN Christina Calderon M.D.   10 mg at 07/22/20 0338   • lactulose 20 GM/30ML solution 30 mL  30 mL Oral BID W/MEALS PRN Heber Armstrong M.D.       • NS (BOLUS) infusion 250 mL  250 mL Intravenous DIALYSIS PRN Eric Pompa M.D.       • Respiratory Therapy Consult   Nebulization Continuous RT Roger Florence Jr., D.O.       • DULoxetine (CYMBALTA) capsule 60 mg  60 mg Oral DAILY Roger Florence Jr., D.O.   60 mg at 07/22/20 0503   • hydrOXYzine HCl (ATARAX) tablet 25 mg  25 mg Oral TID PRN Roger Florence Jr., D.O.   25 mg at 07/10/20 1642   • levothyroxine (SYNTHROID) tablet 75 mcg  75 mcg Oral AM ES Roger Florence Jr., D.O.   75 mcg at 07/22/20 0502   • lidocaine (LIDODERM) 5 % 1 Patch  1 Patch Transdermal Q24HRS PRN Roger Florence Jr., D.O.   1 Patch at 07/04/20 2211   • traZODone (DESYREL) tablet 300 mg  300 mg Oral BID Roger Florence Jr., D.O.   300 mg at 07/22/20 0502   • rosuvastatin (CRESTOR) tablet 10 mg  10 mg Oral Q EVENING Roger Floernce Jr., D.O.   10 mg at 07/21/20 1746   • senna-docusate (PERICOLACE or SENOKOT S) 8.6-50 MG per tablet 2 Tab  2 Tab Oral BID Roger Florence Jr., D.O.   2 Tab at 07/22/20 0502    And   • polyethylene glycol/lytes (MIRALAX) PACKET  1 Packet  1 Packet Oral QDAY PRN ROBY Sarmiento Jr..O.   1 Packet at 07/02/20 0500    And   • magnesium hydroxide (MILK OF MAGNESIA) suspension 30 mL  30 mL Oral QDAY PRN GHULAM Sarmiento Jr.OMelonie        And   • bisacodyl (DULCOLAX) suppository 10 mg  10 mg Rectal QDAY PRN ROBY Sarmiento Jr..O.       • ondansetron (ZOFRAN) syringe/vial injection 4 mg  4 mg Intravenous Q4HRS PRN ROBY Sarmiento Jr..O.       • ondansetron (ZOFRAN ODT) dispertab 4 mg  4 mg Oral Q4HRS PRN ROBY Sarmiento Jr..O.       • Pharmacy Consult Request ...Pain Management Review 1 Each  1 Each Other PHARMACY TO DOSE ROBY Sarmiento Jr..LANCE.       • lactated ringers infusion (BOLUS)  500 mL Intravenous Once PRN GHULAM Sarmiento Jr.O.       • umeclidinium-vilanterol (ANORO ELLIPTA) inhaler 1 Puff  1 Puff Inhalation QDAILY (RT) ROBY Sarmiento Jr..O.   1 Puff at 07/22/20 0737   • insulin regular (HUMULIN R) injection 1-6 Units  1-6 Units Subcutaneous 4X/DAY ACHLUIS Glover M.D.   2 Units at 07/22/20 1237    And   • glucose 4 g chewable tablet 16 g  16 g Oral Q15 MIN PRN Bob Glover M.D.        And   • dextrose 50% (D50W) injection 50 mL  50 mL Intravenous Q15 MIN PRN Bob Glover M.D.         Current Outpatient Medications   Medication Sig Dispense Refill   • [START ON 7/23/2020] amLODIPine (NORVASC) 5 MG Tab Take 1 Tab by mouth every day. 30 Tab 0   • furosemide (LASIX) 80 MG Tab Take 1 Tab by mouth 2 Times a Day. 60 Tab 0   • [START ON 7/23/2020] levothyroxine (SYNTHROID) 75 MCG Tab Take 1 Tab by mouth Every morning on an empty stomach. 30 Tab 0   • hydrALAZINE (APRESOLINE) 25 MG Tab Take 1 Tab by mouth 3 times a day. 90 Tab 0   • hydrOXYzine HCl (ATARAX) 25 MG Tab Take 1 Tab by mouth 3 times a day as needed for Anxiety. 30 Tab 0   • amitriptyline (ELAVIL) 50 MG Tab Take 100 mg by mouth every evening.     • lactulose 10 GM/15ML Solution Take 20 g by mouth 2 times a day as needed.     • lidocaine (XYLOCAINE) 5  % Ointment Apply 1 Each to affected area(s) as needed.     • oxyCODONE immediate release (ROXICODONE) 10 MG immediate release tablet Take 10 mg by mouth 3 times a day as needed for Moderate Pain.     • traZODone (DESYREL) 150 MG Tab Take 300 mg by mouth 2 times a day.     • nystatin/triamcinolone (MYCOLOG) 665234-3.1 UNIT/GM-% Cream APPLY A THIN LAYER TO FUNGAL RASH ONCE DAILY AS NEEDED 30 g 0   • DULoxetine (CYMBALTA) 60 MG Cap DR Particles delayed-release capsule TAKE 1 CAPSULE BY MOUTH DAILY 90 Cap 1   • rosuvastatin (CRESTOR) 10 MG Tab TAKE 1 TABLET BY MOUTH EVERY EVENING 100 Tab 3   • TRELEGY ELLIPTA 100-62.5-25 MCG/INH AEROSOL POWDER, BREATH ACTIVATED INHALE 1 PUFF BY MOUTH DAILY 3 Each 0   • cyclobenzaprine (FLEXERIL) 10 MG Tab Take 1 Tab by mouth every bedtime. 90 Tab 3   • Diclofenac Sodium 1 % Gel Apply 2 g to skin as directed 2 times a day as needed (for pain). 90 g 3   • ipratropium-albuterol (DUONEB) 0.5-2.5 (3) MG/3ML nebulizer solution 3 mL by Nebulization route every 6 hours as needed for Shortness of Breath. 30 Bullet 0   • insulin glargine (LANTUS) 100 UNIT/ML Solution Inject 20 Units as instructed every evening.     • albuterol 108 (90 Base) MCG/ACT Aero Soln inhalation aerosol Inhale 2 Puffs by mouth every four hours as needed for Shortness of Breath.  3         Assessment/Plan  69 y.o. female with a history of CKD 3, COPD who presented 6/30/2020 with shortness of breath, found to have bradycardia, hypotension, and HAN.    1.  HAN on CKD stage III.  Nonoliguric, stable.  Required dialysis from 6/30/2020 through 7/8/2020.  Her previous baseline creatinine ran between 1.8 and 2.2.  On admission, creatinine was up to 5.7.  HAN was likely due to ATN from cardiogenic shock on admission.  She might have progressed to CKD stage IV.  No acute need for dialysis.  Avoid nephrotoxins.  Check labs daily while inpatient.  Continue diuretics.    2.  Shortness of breath, improving.  Continue Lasix 80 mg p.o.  twice daily, and to continue this dose on discharge.    3.  Azotemia, stable.  No signs of uremia.  Check labs daily while inpatient.    4.  Normocytic anemia, worsening, required blood transfusion on 7/20/2020.  Patient likely has anemia of chronic disease.  Give Epogen 10,000 units subcutaneously once.  Check CBC daily while inpatient.  Patient might require ongoing Epogen.    6.  Thrombocytopenia, resolved.    7.  Hypertension, controlled.    Patient should follow-up with us in the nephrology clinic in the next 2 to 6 weeks.  Patient should have repeat labs done a few days to 1 week prior to nephrology appointment    Eric Pompa MD  Nephrology

## 2020-07-22 NOTE — PROGRESS NOTES
Patient discharged to 1310 via car with escort. Discharge paperwork was discussed with the patient. LDAs were removed. Tele monitor disconnected. Pt prescriptions sent to pharmacy. Patient escorted out in wheelchair.

## 2020-07-22 NOTE — DISCHARGE PLANNING
Anticipated Disposition:  NEYMAR with HH     Action:   This CM received from [facility] [phone], requested updates, we discussed DC plan is home with Home Health, and currently not medically clear due to low HH, and attending physician is checking Occult blood. The CM voiced understanding.           Barriers to Discharge:   Medical clearance -decreased HH       Plan:  Please follow up with attending physician for medical clearance.

## 2020-07-22 NOTE — DISCHARGE SUMMARY
Discharge Summary    CHIEF COMPLAINT ON ADMISSION  Chief Complaint   Patient presents with   • Shortness of Breath   • Dizziness     Reason for Admission  Shortness of breath    Admission Date  6/30/2020    CODE STATUS  DNAR, DANNA OK    HPI & HOSPITAL COURSE  69 years old female with past medical history of chronic obstructive pulmonary disease on 4 L of oxygen nasal cannula at baseline, diabetes, hypothyroidism hepatitis C, hypertension, heart failure, chronic kidney disease stage IV admitted 6/30 with progressive shortness of breath and cough initially to the intensive care unit.  She was found to have bradycardia with a rate of 37, junctional rhythm. COVID 19 negative x3.  Her potassium was elevated 6.3.  Nephrology consulted and started HD with improvement of HR, BP and SOB.  3 HD sessions performed including today with 2 L removed.  Patient was given intravenous and oral steroids with improvement in her respiratory status.  Renal function also improved and dialysis was stopped she was noticed to have anemia that required blood transfusions.  Patient did not have overt evidence for GI bleeding, it is felt that her anemia is likely due to chronic kidney disease and possibly slow GI losses.  Patient was offered to have further investigations including occult blood testing and possible upper and lower endoscopies however she refused and did not want to stay in the hospital for that.  Urine cultures grew VRE that was Zosyn sensitive, patient completed a course of antibiotics.  She also received a course of ceftriaxone and azithromycin for pneumonia.  Physical and occupational therapy evaluation recommended skilled nursing facility however patient refused understanding and accepting the risk of her decision and wanted to go home with home health that was ordered. Therefore, she is discharged in fair and stable condition to home with close outpatient follow-up.    The patient met 2-midnight criteria for an inpatient stay  at the time of discharge.    Discharge Date  7/22/2020     DISCHARGE DIAGNOSES  Active Problems:    Pneumonia due to infectious organism POA: Unknown    Acute renal failure superimposed on stage 4 chronic kidney disease (HCC) POA: Yes    DM type 2, uncontrolled, with renal complications (HCC) (Chronic) POA: Yes    Goals of care, counseling/discussion POA: Yes    Pulmonary hypertension (HCC) POA: Yes    Anemia POA: Yes    Acute on chronic respiratory failure with hypoxia (HCC) POA: Yes    Chronic hepatitis C without hepatic coma (HCC) POA: Yes    Renovascular hypertension POA: Unknown    Morbid obesity (HCC) (Chronic) POA: Unknown    Essential hypertension POA: Yes    Hypothyroidism (Chronic) POA: Yes    EDITH (obstructive sleep apnea) (Chronic) POA: Yes    Chronic use of opiate drug for therapeutic purpose POA: Yes    Tobacco dependence POA: Yes  Resolved Problems:    Hyperkalemia POA: Yes    Acute exacerbation of chronic obstructive pulmonary disease (COPD) (HCC) POA: Yes    Leukocytosis POA: Yes    Hyponatremia POA: Unknown    Pulmonary HTN (HCC) POA: Yes    Junctional bradycardia POA: Yes    UTI (urinary tract infection) due to Enterococcus POA: Unknown    FOLLOW UP  Crystal Ramos M.D.  [address] [facility] [address]  [phone]    Go on 8/12/2020  10am follow up with primary care.     Marge Vargas M.D.  [address] [facility] [address]  [facility] [address]  [phone]    Go on 7/24/2020  Please arrive at 10:45 am for your Cardiology appointment at 11:00 am. Please bring your ID and List of medications and wear a mask to your appointment. Thank you     Freedom Dutta M.D.  [address] [facility] [address]  [facility] [address]  [phone]    Go on 9/1/2020  3:45PM CHECK IN FOR A FOLLOW UP WITH PULMONOLOGY. THANK YOU!    Crystal Ramos M.D.  [address] [facility] [address]  [phone]    In 5 days  Anemia follow up / Occult blood testing    Trip Morton M.D.  [address] [place] [address]  [address]  [facility] [address]  [phone]    In 2 weeks      MEDICATIONS ON DISCHARGE     Medication List      CHANGE how you take these medications      Instructions   amLODIPine 5 MG Tabs  Start taking on:  July 23, 2020  What changed:    · medication strength  · how much to take  Commonly known as:  NORVASC   Take 1 Tab by mouth every day.  Dose:  5 mg     furosemide 80 MG Tabs  What changed:    · medication strength  · how much to take  · when to take this  Commonly known as:  LASIX   Take 1 Tab by mouth 2 Times a Day.  Dose:  80 mg     levothyroxine 75 MCG Tabs  Start taking on:  July 23, 2020  What changed:    · how much to take  · how to take this  · when to take this  · additional instructions  Commonly known as:  SYNTHROID   Take 1 Tab by mouth Every morning on an empty stomach.  Dose:  75 mcg        CONTINUE taking these medications      Instructions   albuterol 108 (90 Base) MCG/ACT Aers inhalation aerosol   Inhale 2 Puffs by mouth every four hours as needed for Shortness of Breath.  Dose:  2 Puff     amitriptyline 50 MG Tabs  Commonly known as:  ELAVIL   Take 100 mg by mouth every evening.  Dose:  100 mg     cyclobenzaprine 10 MG Tabs  Commonly known as:  FLEXERIL   Take 1 Tab by mouth every bedtime.  Dose:  10 mg     Diclofenac Sodium 1 % Gel   Apply 2 g to skin as directed 2 times a day as needed (for pain).  Dose:  2 g     DULoxetine 60 MG Cpep delayed-release capsule  Commonly known as:  CYMBALTA   TAKE 1 CAPSULE BY MOUTH DAILY     hydrALAZINE 25 MG Tabs  Commonly known as:  APRESOLINE   Take 1 Tab by mouth 3 times a day.  Dose:  25 mg     hydrOXYzine HCl 25 MG Tabs  Commonly known as:  ATARAX   Take 1 Tab by mouth 3 times a day as needed for Anxiety.  Dose:  25 mg     insulin glargine 100 UNIT/ML Soln  Commonly known as:  Lantus   Inject 20 Units as instructed every evening.  Dose:  20 Units     ipratropium-albuterol 0.5-2.5 (3) MG/3ML nebulizer solution  Commonly known as:  DUONEB   3 mL by  "Nebulization route every 6 hours as needed for Shortness of Breath.  Dose:  3 mL     lactulose 10 GM/15ML Soln   Take 20 g by mouth 2 times a day as needed.  Dose:  20 g     lidocaine 5 % Oint  Commonly known as:  XYLOCAINE   Apply 1 Each to affected area(s) as needed.  Dose:  1 Each     nystatin/triamcinolone 002614-1.1 UNIT/GM-% Crea  Commonly known as:  MYCOLOG   Doctor's comments:  Pt to make appt regarding this medication for refills.  APPLY A THIN LAYER TO FUNGAL RASH ONCE DAILY AS NEEDED     oxyCODONE immediate release 10 MG immediate release tablet  Commonly known as:  ROXICODONE   Take 10 mg by mouth 3 times a day as needed for Moderate Pain.  Dose:  10 mg     rosuvastatin 10 MG Tabs  Commonly known as:  CRESTOR   TAKE 1 TABLET BY MOUTH EVERY EVENING     traZODone 150 MG Tabs  Commonly known as:  DESYREL   Take 300 mg by mouth 2 times a day.  Dose:  300 mg     Trelegy Ellipta 100-62.5-25 MCG/INH Aepb  Generic drug:  Fluticasone-Umeclidin-Vilant   INHALE 1 PUFF BY MOUTH DAILY        STOP taking these medications    metoprolol 25 MG Tabs  Commonly known as:  LOPRESSOR          Allergies  Allergies   Allergen Reactions   • Doxycycline Itching   • Vitamin C Diarrhea     \"violent diarrhea\"   • Codeine Nausea     Severe stomach pain   • Gabapentin Palpitations     Gets shaky and falls    • Keflex Rash     Severe rash, but TOLERATES PENICILLINS, Rocephin    • Lyrica Nausea     Nausea, dizzy   • Powders    • Sudafed Nausea and Unspecified     Chest pain, nausea     DIET  Orders Placed This Encounter   Procedures   • Diet Order Diabetic, Renal, Cardiac     Standing Status:   Standing     Number of Occurrences:   1     Order Specific Question:   Diet:     Answer:   Diabetic [3]     Order Specific Question:   Diet:     Answer:   Renal [8]     Order Specific Question:   Diet:     Answer:   Cardiac [6]     Order Specific Question:   Electrolyte modifications:     Answer:   Low Potassium [3] "       CONSULTATIONS  Nephrology.  Cardiology.  Critical care    PROCEDURES  Central line placement 6/30.  Arterial line placement 6/30.    LABORATORY  Lab Results   Component Value Date    SODIUM 138 07/22/2020    POTASSIUM 4.4 07/22/2020    CHLORIDE 94 (L) 07/22/2020    CO2 33 07/22/2020    GLUCOSE 101 (H) 07/22/2020    BUN 51 (H) 07/22/2020    CREATININE 2.83 (H) 07/22/2020        Lab Results   Component Value Date    WBC 6.3 07/22/2020    HEMOGLOBIN 8.2 (L) 07/22/2020    HEMATOCRIT 25.7 (L) 07/22/2020    PLATELETCT 211 07/22/2020      Total time of the discharge process exceeds 33 minutes.

## 2020-07-22 NOTE — DISCHARGE INSTRUCTIONS
Discharge Instructions    Discharge Instructions per Nataliia Chavez M.D.  Follow-up with primary care provider within 5 days. [you need follow up labs on your anemia]  Follow-up with cardiology as instructed   DIAGNOSIS: Pneumonia due to infectious organism, Acute renal failure superimposed on stage 4 chronic kidney disease, Anemia, Hyperkalemia, & Urinary tract infection.   Return to ER if you develop any of the following symptoms fever, chills, weakness, not feeling well, rash, bleeding, blurred vision, palpitations, cough, pain in any part of your body, shortness of breath, nausea, vomiting, diarrhea, difficulty with urination, dizziness, headache, seizures, loss of consciousness or if you develop any new symptoms.        Discharged to home by car with escort. Discharged via escort, hospital escort: Yes.  Special equipment needed: Oxygen    Be sure to schedule a follow-up appointment with your primary care doctor or any specialists as instructed.     Discharge Plan:   Diet Plan: Discussed  Activity Level: Discussed  Confirmed Follow up Appointment: Patient to Call and Schedule Appointment  Confirmed Symptoms Management: Discussed  Medication Reconciliation Updated: Yes    I understand that a diet low in cholesterol, fat, and sodium is recommended for good health. Unless I have been given specific instructions below for another diet, I accept this instruction as my diet prescription.   Other diet: renal, cardiac    Special Instructions: None    · Is patient discharged on Warfarin / Coumadin?   No     Depression / Suicide Risk    As you are discharged from this [facility] facility, it is important to learn how to keep safe from harming yourself.    Recognize the warning signs:  · Abrupt changes in personality, positive or negative- including increase in energy   · Giving away possessions  · Change in eating patterns- significant weight changes-  positive or negative  · Change in sleeping patterns- unable to  sleep or sleeping all the time   · Unwillingness or inability to communicate  · Depression  · Unusual sadness, discouragement and loneliness  · Talk of wanting to die  · Neglect of personal appearance   · Rebelliousness- reckless behavior  · Withdrawal from people/activities they love  · Confusion- inability to concentrate     If you or a loved one observes any of these behaviors or has concerns about self-harm, here's what you can do:  · Talk about it- your feelings and reasons for harming yourself  · Remove any means that you might use to hurt yourself (examples: pills, rope, extension cords, firearm)  · Get professional help from the community (Mental Health, Substance Abuse, psychological counseling)  · Do not be alone:Call your Safe Contact- someone whom you trust who will be there for you.  · Call your local CRISIS HOTLINE 051-9623 or 583-373-9529  · Call your local Children's Mobile Crisis Response Team [place] (383) 845-0783 or www.[employer]  · Call the toll free National Suicide Prevention Hotlines   · National Suicide Prevention Lifeline 588-692-DUKX (0630)  · National Hope Line Network 800-SUICIDE (586-3400)        Urinary Tract Infection, Adult  A urinary tract infection (UTI) is an infection of any part of the urinary tract. The urinary tract includes:  · The kidneys.  · The ureters.  · The bladder.  · The urethra.  These organs make, store, and get rid of pee (urine) in the body.  What are the causes?  This is caused by germs (bacteria) in your genital area. These germs grow and cause swelling (inflammation) of your urinary tract.  What increases the risk?  You are more likely to develop this condition if:  · You have a small, thin tube (catheter) to drain pee.  · You cannot control when you pee or poop (incontinence).  · You are female, and:  ? You use these methods to prevent pregnancy:  ? A medicine that kills sperm (spermicide).  ? A device that blocks sperm (diaphragm).  ? You have  low levels of a female hormone (estrogen).  ? You are pregnant.  · You have genes that add to your risk.  · You are sexually active.  · You take antibiotic medicines.  · You have trouble peeing because of:  ? A prostate that is bigger than normal, if you are male.  ? A blockage in the part of your body that drains pee from the bladder (urethra).  ? A kidney stone.  ? A nerve condition that affects your bladder (neurogenic bladder).  ? Not getting enough to drink.  ? Not peeing often enough.  · You have other conditions, such as:  ? Diabetes.  ? A weak disease-fighting system (immune system).  ? Sickle cell disease.  ? Gout.  ? Injury of the spine.  What are the signs or symptoms?  Symptoms of this condition include:  · Needing to pee right away (urgently).  · Peeing often.  · Peeing small amounts often.  · Pain or burning when peeing.  · Blood in the pee.  · Pee that smells bad or not like normal.  · Trouble peeing.  · Pee that is cloudy.  · Fluid coming from the vagina, if you are female.  · Pain in the belly or lower back.  Other symptoms include:  · Throwing up (vomiting).  · No urge to eat.  · Feeling mixed up (confused).  · Being tired and grouchy (irritable).  · A fever.  · Watery poop (diarrhea).  How is this treated?  This condition may be treated with:  · Antibiotic medicine.  · Other medicines.  · Drinking enough water.  Follow these instructions at home:    Medicines  · Take over-the-counter and prescription medicines only as told by your doctor.  · If you were prescribed an antibiotic medicine, take it as told by your doctor. Do not stop taking it even if you start to feel better.  General instructions  · Make sure you:  ? Pee until your bladder is empty.  ? Do not hold pee for a long time.  ? Empty your bladder after sex.  ? Wipe from front to back after pooping if you are a female. Use each tissue one time when you wipe.  · Drink enough fluid to keep your pee pale yellow.  · Keep all follow-up visits  as told by your doctor. This is important.  Contact a doctor if:  · You do not get better after 1-2 days.  · Your symptoms go away and then come back.  Get help right away if:  · You have very bad back pain.  · You have very bad pain in your lower belly.  · You have a fever.  · You are sick to your stomach (nauseous).  · You are throwing up.  Summary  · A urinary tract infection (UTI) is an infection of any part of the urinary tract.  · This condition is caused by germs in your genital area.  · There are many risk factors for a UTI. These include having a small, thin tube to drain pee and not being able to control when you pee or poop.  · Treatment includes antibiotic medicines for germs.  · Drink enough fluid to keep your pee pale yellow.  This information is not intended to replace advice given to you by your health care provider. Make sure you discuss any questions you have with your health care provider.  Document Released: 06/05/2009 Document Revised: 12/05/2019 Document Reviewed: 06/27/2019  [employer] Patient Education © 2020 [employer]        COPD and Physical Activity  Chronic obstructive pulmonary disease (COPD) is a long-term (chronic) condition that affects the lungs. COPD is a general term that can be used to describe many different lung problems that cause lung swelling (inflammation) and limit airflow, including chronic bronchitis and emphysema.  The main symptom of COPD is shortness of breath, which makes it harder to do even simple tasks. This can also make it harder to exercise and be active. Talk with your health care provider about treatments to help you breathe better and actions you can take to prevent breathing problems during physical activity.  What are the benefits of exercising with COPD?  Exercising regularly is an important part of a healthy lifestyle. You can still exercise and do physical activities even though you have COPD. Exercise and physical activity improve your shortness of  breath by increasing blood flow (circulation). This causes your heart to pump more oxygen through your body. Moderate exercise can improve your:  · Oxygen use.  · Energy level.  · Shortness of breath.  · Strength in your breathing muscles.  · Heart health.  · Sleep.  · Self-esteem and feelings of self-worth.  · Depression, stress, and anxiety levels.  Exercise can benefit everyone with COPD. The severity of your disease may affect how hard you can exercise, especially at first, but everyone can benefit. Talk with your health care provider about how much exercise is safe for you, and which activities and exercises are safe for you.  What actions can I take to prevent breathing problems during physical activity?  · Sign up for a pulmonary rehabilitation program. This type of program may include:  ? Education about lung diseases.  ? Exercise classes that teach you how to exercise and be more active while improving your breathing. This usually involves:  § Exercise using your lower extremities, such as a stationary bicycle.  § About 30 minutes of exercise, 2 to 5 times per week, for 6 to 12 weeks  § Strength training, such as push ups or leg lifts.  ? Nutrition education.  ? Group classes in which you can talk with others who also have COPD and learn ways to manage stress.  · If you use an oxygen tank, you should use it while you exercise. Work with your health care provider to adjust your oxygen for your physical activity. Your resting flow rate is different from your flow rate during physical activity.  · While you are exercising:  ? Take slow breaths.  ? Pace yourself and do not try to go too fast.  ? Purse your lips while breathing out. Pursing your lips is similar to a kissing or whistling position.  ? If doing exercise that uses a quick burst of effort, such as weight lifting:  § Breathe in before starting the exercise.  § Breathe out during the hardest part of the exercise (such as raising the weights).  Where to  find support  You can find support for exercising with COPD from:  · Your health care provider.  · A pulmonary rehabilitation program.  · Your local health department or community health programs.  · Support groups, online or in-person. Your health care provider may be able to recommend support groups.  Where to find more information  You can find more information about exercising with COPD from:  · American Lung Association: lung.org.  · COPD Foundation: copdfoundation.org.  Contact a health care provider if:  · Your symptoms get worse.  · You have chest pain.  · You have nausea.  · You have a fever.  · You have trouble talking or catching your breath.  · You want to start a new exercise program or a new activity.  Summary  · COPD is a general term that can be used to describe many different lung problems that cause lung swelling (inflammation) and limit airflow. This includes chronic bronchitis and emphysema.  · Exercise and physical activity improve your shortness of breath by increasing blood flow (circulation). This causes your heart to provide more oxygen to your body.  · Contact your health care provider before starting any exercise program or new activity. Ask your health care provider what exercises and activities are safe for you.  This information is not intended to replace advice given to you by your health care provider. Make sure you discuss any questions you have with your health care provider.  Document Released: 01/10/2019 Document Revised: 04/08/2020 Document Reviewed: 01/10/2019  [employer] Patient Education © 2020 [employer]

## 2020-07-22 NOTE — PROGRESS NOTES
Received Bedside report. Assumed care at 0700. This pt is AOx4, ambulatory with x2 assist, voiding adequately, reports baseline generalized pain 5/10. Patient and RN discussed plan of care: questions answered. Labs noted, assessment complete, patient tolerating renal cardiac diet. Tele box in place. Pt is on 5L of O2 via NC. Call light in place, fall precautions in place, patient educated on importance of calling for assistance. No additional needs at this time. VSS

## 2020-08-21 ENCOUNTER — APPOINTMENT (OUTPATIENT)
Dept: NEPHROLOGY | Facility: MEDICAL CENTER | Age: 70
End: 2020-08-21
Payer: MEDICARE

## 2020-09-22 ENCOUNTER — TELEMEDICINE (OUTPATIENT)
Dept: NEPHROLOGY | Facility: MEDICAL CENTER | Age: 70
End: 2020-09-22
Payer: MEDICARE

## 2020-09-22 DIAGNOSIS — N17.9 AKI (ACUTE KIDNEY INJURY) (HCC): ICD-10-CM

## 2020-09-22 DIAGNOSIS — E11.29 TYPE 2 DIABETES MELLITUS WITH MICROALBUMINURIA, WITH LONG-TERM CURRENT USE OF INSULIN (HCC): ICD-10-CM

## 2020-09-22 DIAGNOSIS — Z79.4 TYPE 2 DIABETES MELLITUS WITH MICROALBUMINURIA, WITH LONG-TERM CURRENT USE OF INSULIN (HCC): ICD-10-CM

## 2020-09-22 DIAGNOSIS — R30.0 DYSURIA: ICD-10-CM

## 2020-09-22 DIAGNOSIS — I10 ESSENTIAL HYPERTENSION: ICD-10-CM

## 2020-09-22 DIAGNOSIS — R80.9 TYPE 2 DIABETES MELLITUS WITH MICROALBUMINURIA, WITH LONG-TERM CURRENT USE OF INSULIN (HCC): ICD-10-CM

## 2020-09-22 DIAGNOSIS — N18.30 STAGE 3 CHRONIC KIDNEY DISEASE: ICD-10-CM

## 2020-09-22 PROCEDURE — 99214 OFFICE O/P EST MOD 30 MIN: CPT | Mod: 95,CR | Performed by: INTERNAL MEDICINE

## 2020-09-22 ASSESSMENT — ENCOUNTER SYMPTOMS
COUGH: 0
SHORTNESS OF BREATH: 0
CHILLS: 0
FEVER: 0
NAUSEA: 0
VOMITING: 0

## 2020-09-22 NOTE — PROGRESS NOTES
"Telemedicine: Established Patient   This evaluation was conducted via Zoom using secure and encrypted videoconferencing technology. The patient was in a private location in the state of [place].    The patient's identity was confirmed and verbal consent was obtained for this virtual visit.    Subjective:   CC:   Chief Complaint   Patient presents with   • Hypertension   • Chronic Kidney Disease       Priya Callahan is a 69 y.o. female presenting for evaluation and management of:CKD  Patient has a history of chronic kidney disease stage III, recent hospitalization for acute kidney injury requiring short-term dialysis, kidney function has improved, patient is doing well overall, unfortunately I do not have any recent lab after hospitalization.  Patient has no hematuria, she does have mild dysuria.  No chest pain no shortness of breath  No recent use of NSAIDs or IV contrast    Review of Systems   Constitutional: Negative for chills, fever and malaise/fatigue.   Respiratory: Negative for cough and shortness of breath.    Cardiovascular: Negative for chest pain and leg swelling.   Gastrointestinal: Negative for nausea and vomiting.   Genitourinary: Positive for dysuria. Negative for frequency and urgency.         Allergies   Allergen Reactions   • Doxycycline Itching   • Vitamin C Diarrhea     \"violent diarrhea\"   • Codeine Nausea     Severe stomach pain   • Gabapentin Palpitations     Gets shaky and falls    • Keflex Rash     Severe rash, but TOLERATES PENICILLINS, Rocephin    • Lyrica Nausea     Nausea, dizzy   • Powders    • Sudafed Nausea and Unspecified     Chest pain, nausea       Current medicines (including changes today)  Current Outpatient Medications   Medication Sig Dispense Refill   • amLODIPine (NORVASC) 5 MG Tab Take 1 Tab by mouth every day. 30 Tab 0   • furosemide (LASIX) 80 MG Tab Take 1 Tab by mouth 2 Times a Day. 60 Tab 0   • levothyroxine (SYNTHROID) 75 MCG Tab Take 1 Tab by mouth Every " morning on an empty stomach. 30 Tab 0   • hydrALAZINE (APRESOLINE) 25 MG Tab Take 1 Tab by mouth 3 times a day. 90 Tab 0   • hydrOXYzine HCl (ATARAX) 25 MG Tab Take 1 Tab by mouth 3 times a day as needed for Anxiety. 30 Tab 0   • amitriptyline (ELAVIL) 50 MG Tab Take 100 mg by mouth every evening.     • lactulose 10 GM/15ML Solution Take 20 g by mouth 2 times a day as needed.     • lidocaine (XYLOCAINE) 5 % Ointment Apply 1 Each to affected area(s) as needed.     • oxyCODONE immediate release (ROXICODONE) 10 MG immediate release tablet Take 10 mg by mouth 3 times a day as needed for Moderate Pain.     • traZODone (DESYREL) 150 MG Tab Take 300 mg by mouth 2 times a day.     • nystatin/triamcinolone (MYCOLOG) 949456-5.1 UNIT/GM-% Cream APPLY A THIN LAYER TO FUNGAL RASH ONCE DAILY AS NEEDED 30 g 0   • DULoxetine (CYMBALTA) 60 MG Cap DR Particles delayed-release capsule TAKE 1 CAPSULE BY MOUTH DAILY 90 Cap 1   • rosuvastatin (CRESTOR) 10 MG Tab TAKE 1 TABLET BY MOUTH EVERY EVENING 100 Tab 3   • TRELEGY ELLIPTA 100-62.5-25 MCG/INH AEROSOL POWDER, BREATH ACTIVATED INHALE 1 PUFF BY MOUTH DAILY 3 Each 0   • cyclobenzaprine (FLEXERIL) 10 MG Tab Take 1 Tab by mouth every bedtime. 90 Tab 3   • Diclofenac Sodium 1 % Gel Apply 2 g to skin as directed 2 times a day as needed (for pain). 90 g 3   • ipratropium-albuterol (DUONEB) 0.5-2.5 (3) MG/3ML nebulizer solution 3 mL by Nebulization route every 6 hours as needed for Shortness of Breath. 30 Bullet 0   • insulin glargine (LANTUS) 100 UNIT/ML Solution Inject 20 Units as instructed every evening.     • albuterol 108 (90 Base) MCG/ACT Aero Soln inhalation aerosol Inhale 2 Puffs by mouth every four hours as needed for Shortness of Breath.  3     No current facility-administered medications for this visit.        Patient Active Problem List    Diagnosis Date Noted   • Frequent falls 02/09/2019     Priority: High   • Pneumonia due to infectious organism 04/04/2017     Priority: High    • Acute renal failure superimposed on stage 4 chronic kidney disease (HCC) 04/04/2017     Priority: High   • Goals of care, counseling/discussion 06/12/2019     Priority: Medium   • Macrocytosis 02/09/2019     Priority: Medium   • Chronic hypertension 06/30/2017     Priority: Medium   • DM type 2, uncontrolled, with renal complications (HCC) 10/30/2013     Priority: Medium   • Mixed anemia 10/30/2013     Priority: Medium   • Obesity (BMI 30-39.9) 02/14/2018     Priority: Low   • Type 2 diabetes mellitus with hyperglycemia, and peripheral neuropathy, with long-term current use of insulin (HCC) 04/04/2017     Priority: Low   • Dyslipidemia 04/04/2017     Priority: Low   • Neuropathy 04/04/2017     Priority: Low   • Tobacco dependence 09/27/2016     Priority: Low   • Chronic constipation 07/07/2015     Priority: Low   • Chronic use of opiate drug for therapeutic purpose 01/29/2015     Priority: Low   • EDITH (obstructive sleep apnea) 01/27/2015     Priority: Low   • Chronic pain syndrome 08/28/2014     Priority: Low   • Vitamin D deficiency 06/12/2014     Priority: Low   • Hypothyroidism 04/01/2014     Priority: Low   • Insomnia 02/25/2014     Priority: Low   • Major depression in full remission (HCC) 11/20/2013     Priority: Low   • Essential hypertension 11/20/2013     Priority: Low   • Morbid obesity (HCC) 10/30/2013     Priority: Low   • Renovascular hypertension 07/06/2020   • Chronic hepatitis C without hepatic coma (HCC) 07/02/2020   • Acute on chronic respiratory failure with hypoxia ([facility]) 08/15/2019   • ACC/AHA stage C systolic heart failure (HCC) 08/06/2019   • Heart failure with preserved ejection fraction (HCC) 08/06/2019   • Non-rheumatic mitral valve stenosis 08/06/2019   • Coronary artery calcification seen on CT scan 08/06/2019   • Non-rheumatic mitral regurgitation 06/19/2019   • Pulmonary hypertension (HCC) 06/15/2019   • Anemia 06/15/2019   • Edema 06/04/2019   • Neck pain 02/26/2019   • Macrocytic  anemia 01/22/2019   • Arthritis 12/20/2018   • Hammer toes of both feet 10/10/2018   • Hospital discharge follow-up 11/15/2017   • Toenail fungus 11/21/2016   • Other spondylosis with radiculopathy, lumbar region 09/16/2016   • Pulmonary nodules 07/07/2015   • Nocturnal hypoxia 07/21/2014   • Heart failure ruled out by cardiologist Dr. Garcia 6/7/2019 11/20/2013       Family History   Problem Relation Age of Onset   • Lung Disease Mother    • Alcohol/Drug Mother    • Heart Disease Mother    • Cancer Father    • Cancer Brother    • Diabetes Brother    • Diabetes Maternal Grandmother    • Stroke Paternal Grandmother    • Heart Disease Paternal Grandmother        She  has a past medical history of Arthritis, ASTHMA, Backpain, Breath shortness, Bronchitis, Congestive heart failure (HCC) (2013), COPD, Diabetes, Disorder of thyroid, Fall, Fibromyalgia, GERD (gastroesophageal reflux disease), Heart burn, Hepatitis C, Hypertension, Indigestion, Infectious disease, Neuropathy, On home oxygen therapy, Other specified symptom associated with female genital organs, Pain (9/13/2016), PND (post-nasal drip), Pneumonia, Psychiatric problem (9/13/2016), RLS (restless legs syndrome), Sleep apnea, and Unspecified urinary incontinence.  She  has a past surgical history that includes gyn surgery; other orthopedic surgery; other orthopedic surgery; us-needle core bx-breast panel; lumpectomy (Left); pr inj dx/ther agnt paravert facet joint, bruce* (Left, 7/10/2015); pr inj dx/ther agnt paravert facet joint, bruce* (7/10/2015); pr inj dx/ther agnt paravert facet joint, bruce* (7/10/2015); pr inj(s) nerve block other periphal (7/10/2015); pr inj dx/ther agnt paravert facet joint, bruce* (Left, 7/17/2015); pr inj dx/ther agnt paravert facet joint, bruce* (7/17/2015); pr inj dx/ther agnt paravert facet joint, bruce* (7/17/2015); pr inj(s) nerve block other periphal (7/17/2015); pr dstr nrolytc agnt parverteb fct sngl lmbr/sacral (Left, 10/2/2015); pr dstr  nrolytc agnt parverteb fct addl lmbr/sacral (10/2/2015); pr inject rx other periph nerve (10/2/2015); pr dstr nrolytc agnt parverteb fct addl lmbr/sacral (10/2/2015); pr dstr nrolytc agnt parverteb fct sngl lmbr/sacral (Right, 11/6/2015); pr dstr nrolytc agnt parverteb fct addl lmbr/sacral (11/6/2015); pr inject rx other periph nerve (11/6/2015); pr dstr nrolytc agnt parverteb fct addl lmbr/sacral (11/6/2015); pr dstr nrolytc agnt parverteb fct sngl lmbr/sacral (Left, 9/16/2016); pr dstr nrolytc agnt parverteb fct addl lmbr/sacral (9/16/2016); pr dstr nrolytc agnt parverteb fct addl lmbr/sacral (9/16/2016); pr fluoroscopic guidance needle placement (9/16/2016); and ankle orif (Left, 5/8/2017).       Objective:   There were no vitals taken for this visit.    Vitals obtained by patient:    Physical Exam:  Constitutional: Alert, no distress, well-groomed.  Skin: No rashes in visible areas.  Eye: Round. Conjunctiva clear, lids normal. No icterus.   ENMT: Lips pink without lesions, good dentition, moist mucous membranes. Phonation normal.  Neck: No masses, no thyromegaly. Moves freely without pain.  CV: Pulse as reported by patient  Respiratory: Unlabored respiratory effort, no cough or audible wheeze  Psych: Alert and oriented x3, normal affect and mood.       Assessment and Plan:   The following treatment plan was discussed:     1. HAN (acute kidney injury) (HCC)  Patient has uremic symptoms  Recheck labs  Avoid nephrotoxins    2. Essential hypertension  Controlled  Continue same medication regimen  Continue low-sodium diet      3. Stage 3 chronic kidney disease (HCC)  No uremic symptoms  Renal dose of medication  Avoid nephrotoxins  Continue same medication regimen  Recheck labs    4. Type 2 diabetes mellitus with microalbuminuria, with long-term current use of insulin (HCC)      5. Dysuria  - URINALYSIS,CULTURE IF INDICATED; Future        Follow-up: Return in about 6 months (around 3/22/2021).

## 2020-10-16 DIAGNOSIS — R21 RASH: ICD-10-CM

## 2020-10-16 NOTE — TELEPHONE ENCOUNTER
Received request via: Patient    Was the patient seen in the last year in this department? Yes    Does the patient have an active prescription (recently filled or refills available) for medication(s) requested? No     Last OV 12/17/2020

## 2020-10-26 ENCOUNTER — HOSPITAL ENCOUNTER (OUTPATIENT)
Dept: HOSPITAL 8 - CARD | Age: 70
Discharge: HOME | End: 2020-10-26
Attending: INTERNAL MEDICINE
Payer: MEDICARE

## 2020-10-26 ENCOUNTER — HOSPITAL ENCOUNTER (OUTPATIENT)
Dept: HOSPITAL 8 - RAD | Age: 70
Discharge: HOME | End: 2020-10-26
Attending: INTERNAL MEDICINE
Payer: MEDICARE

## 2020-10-26 DIAGNOSIS — I70.0: ICD-10-CM

## 2020-10-26 DIAGNOSIS — M51.34: ICD-10-CM

## 2020-10-26 DIAGNOSIS — J92.9: Primary | ICD-10-CM

## 2020-10-26 DIAGNOSIS — J98.4: ICD-10-CM

## 2020-10-26 DIAGNOSIS — J94.8: ICD-10-CM

## 2020-10-26 DIAGNOSIS — R91.8: ICD-10-CM

## 2020-10-26 DIAGNOSIS — Z02.9: Primary | ICD-10-CM

## 2020-10-26 PROCEDURE — 94618 PULMONARY STRESS TESTING: CPT

## 2020-10-26 PROCEDURE — 94726 PLETHYSMOGRAPHY LUNG VOLUMES: CPT

## 2020-10-26 PROCEDURE — 94729 DIFFUSING CAPACITY: CPT

## 2020-10-26 PROCEDURE — 94060 EVALUATION OF WHEEZING: CPT

## 2020-10-26 PROCEDURE — 71250 CT THORAX DX C-: CPT

## 2020-10-28 ENCOUNTER — TELEPHONE (OUTPATIENT)
Dept: MEDICAL GROUP | Facility: PHYSICIAN GROUP | Age: 70
End: 2020-10-28

## 2020-10-30 NOTE — CARE PLAN
Duo Q4, Anoro Ellipta Qday, Flovent Qday, BiPAP PRN, 5L NC  Pt tolerating tx well without complications   none

## 2020-11-29 ENCOUNTER — APPOINTMENT (OUTPATIENT)
Dept: RADIOLOGY | Facility: MEDICAL CENTER | Age: 70
DRG: 291 | End: 2020-11-29
Attending: EMERGENCY MEDICINE
Payer: MEDICARE

## 2020-11-29 ENCOUNTER — HOSPITAL ENCOUNTER (INPATIENT)
Facility: MEDICAL CENTER | Age: 70
LOS: 2 days | DRG: 291 | End: 2020-12-03
Attending: EMERGENCY MEDICINE | Admitting: HOSPITALIST
Payer: MEDICARE

## 2020-11-29 DIAGNOSIS — I50.33 ACUTE ON CHRONIC DIASTOLIC CONGESTIVE HEART FAILURE (HCC): ICD-10-CM

## 2020-11-29 DIAGNOSIS — E66.01 MORBID OBESITY (HCC): Chronic | ICD-10-CM

## 2020-11-29 LAB
ABO GROUP BLD: NORMAL
ALBUMIN SERPL BCP-MCNC: 3.4 G/DL (ref 3.2–4.9)
ALBUMIN/GLOB SERPL: 1.1 G/DL
ALP SERPL-CCNC: 65 U/L (ref 30–99)
ALT SERPL-CCNC: 30 U/L (ref 2–50)
ANION GAP SERPL CALC-SCNC: 10 MMOL/L (ref 7–16)
APPEARANCE UR: CLEAR
AST SERPL-CCNC: 18 U/L (ref 12–45)
BACTERIA #/AREA URNS HPF: NEGATIVE /HPF
BARCODED ABORH UBTYP: 5100
BARCODED PRD CODE UBPRD: NORMAL
BARCODED UNIT NUM UBUNT: NORMAL
BASOPHILS # BLD AUTO: 0.2 % (ref 0–1.8)
BASOPHILS # BLD: 0.03 K/UL (ref 0–0.12)
BILIRUB SERPL-MCNC: 0.4 MG/DL (ref 0.1–1.5)
BILIRUB UR QL STRIP.AUTO: NEGATIVE
BLD GP AB SCN SERPL QL: NORMAL
BUN SERPL-MCNC: 44 MG/DL (ref 8–22)
CALCIUM SERPL-MCNC: 8.7 MG/DL (ref 8.5–10.5)
CHLORIDE SERPL-SCNC: 105 MMOL/L (ref 96–112)
CO2 SERPL-SCNC: 22 MMOL/L (ref 20–33)
COLOR UR: YELLOW
COMPONENT R 8504R: NORMAL
COVID ORDER STATUS COVID19: NORMAL
CREAT SERPL-MCNC: 2.24 MG/DL (ref 0.5–1.4)
EKG IMPRESSION: NORMAL
EOSINOPHIL # BLD AUTO: 0.13 K/UL (ref 0–0.51)
EOSINOPHIL NFR BLD: 1.1 % (ref 0–6.9)
EPI CELLS #/AREA URNS HPF: ABNORMAL /HPF
ERYTHROCYTE [DISTWIDTH] IN BLOOD BY AUTOMATED COUNT: 50 FL (ref 35.9–50)
FERRITIN SERPL-MCNC: 1511 NG/ML (ref 10–291)
GLOBULIN SER CALC-MCNC: 3.1 G/DL (ref 1.9–3.5)
GLUCOSE SERPL-MCNC: 240 MG/DL (ref 65–99)
GLUCOSE UR STRIP.AUTO-MCNC: 100 MG/DL
HCT VFR BLD AUTO: 19.6 % (ref 37–47)
HGB BLD-MCNC: 6 G/DL (ref 12–16)
HGB RETIC QN AUTO: 29.1 PG/CELL (ref 29–35)
HYALINE CASTS #/AREA URNS LPF: ABNORMAL /LPF
IMM GRANULOCYTES # BLD AUTO: 0.13 K/UL (ref 0–0.11)
IMM GRANULOCYTES NFR BLD AUTO: 1.1 % (ref 0–0.9)
IMM RETICS NFR: 29.3 % (ref 9.3–17.4)
IRON SATN MFR SERPL: 10 % (ref 15–55)
IRON SERPL-MCNC: 19 UG/DL (ref 40–170)
KETONES UR STRIP.AUTO-MCNC: NEGATIVE MG/DL
LEUKOCYTE ESTERASE UR QL STRIP.AUTO: NEGATIVE
LYMPHOCYTES # BLD AUTO: 0.57 K/UL (ref 1–4.8)
LYMPHOCYTES NFR BLD: 4.7 % (ref 22–41)
MCH RBC QN AUTO: 30.2 PG (ref 27–33)
MCHC RBC AUTO-ENTMCNC: 30.6 G/DL (ref 33.6–35)
MCV RBC AUTO: 98.5 FL (ref 81.4–97.8)
MICRO URNS: ABNORMAL
MONOCYTES # BLD AUTO: 0.81 K/UL (ref 0–0.85)
MONOCYTES NFR BLD AUTO: 6.7 % (ref 0–13.4)
NEUTROPHILS # BLD AUTO: 10.49 K/UL (ref 2–7.15)
NEUTROPHILS NFR BLD: 86.2 % (ref 44–72)
NITRITE UR QL STRIP.AUTO: NEGATIVE
NRBC # BLD AUTO: 0.02 K/UL
NRBC BLD-RTO: 0.2 /100 WBC
NT-PROBNP SERPL IA-MCNC: 1985 PG/ML (ref 0–125)
PH UR STRIP.AUTO: 5 [PH] (ref 5–8)
PLATELET # BLD AUTO: 188 K/UL (ref 164–446)
PMV BLD AUTO: 9.1 FL (ref 9–12.9)
POTASSIUM SERPL-SCNC: 4.8 MMOL/L (ref 3.6–5.5)
PRODUCT TYPE UPROD: NORMAL
PROT SERPL-MCNC: 6.5 G/DL (ref 6–8.2)
PROT UR QL STRIP: 300 MG/DL
RBC # BLD AUTO: 1.99 M/UL (ref 4.2–5.4)
RBC # URNS HPF: ABNORMAL /HPF
RBC UR QL AUTO: ABNORMAL
RETICS # AUTO: 0.11 M/UL (ref 0.04–0.06)
RETICS/RBC NFR: 5.5 % (ref 0.8–2.1)
RH BLD: NORMAL
SODIUM SERPL-SCNC: 137 MMOL/L (ref 135–145)
SP GR UR STRIP.AUTO: 1.02
TIBC SERPL-MCNC: 194 UG/DL (ref 250–450)
TROPONIN T SERPL-MCNC: 67 NG/L (ref 6–19)
UIBC SERPL-MCNC: 175 UG/DL (ref 110–370)
UNIT STATUS USTAT: NORMAL
UROBILINOGEN UR STRIP.AUTO-MCNC: 0.2 MG/DL
WBC # BLD AUTO: 12.2 K/UL (ref 4.8–10.8)
WBC #/AREA URNS HPF: ABNORMAL /HPF

## 2020-11-29 PROCEDURE — 84484 ASSAY OF TROPONIN QUANT: CPT

## 2020-11-29 PROCEDURE — 96374 THER/PROPH/DIAG INJ IV PUSH: CPT

## 2020-11-29 PROCEDURE — 83540 ASSAY OF IRON: CPT

## 2020-11-29 PROCEDURE — 71045 X-RAY EXAM CHEST 1 VIEW: CPT

## 2020-11-29 PROCEDURE — 0241U HCHG SARS-COV-2 COVID-19 NFCT DS RESP RNA 4 TRGT MIC: CPT

## 2020-11-29 PROCEDURE — 36430 TRANSFUSION BLD/BLD COMPNT: CPT

## 2020-11-29 PROCEDURE — 86900 BLOOD TYPING SEROLOGIC ABO: CPT

## 2020-11-29 PROCEDURE — 99220 PR INITIAL OBSERVATION CARE,LEVL III: CPT | Performed by: STUDENT IN AN ORGANIZED HEALTH CARE EDUCATION/TRAINING PROGRAM

## 2020-11-29 PROCEDURE — 93005 ELECTROCARDIOGRAM TRACING: CPT | Performed by: EMERGENCY MEDICINE

## 2020-11-29 PROCEDURE — 96375 TX/PRO/DX INJ NEW DRUG ADDON: CPT

## 2020-11-29 PROCEDURE — 86901 BLOOD TYPING SEROLOGIC RH(D): CPT

## 2020-11-29 PROCEDURE — 700111 HCHG RX REV CODE 636 W/ 250 OVERRIDE (IP): Performed by: EMERGENCY MEDICINE

## 2020-11-29 PROCEDURE — 94760 N-INVAS EAR/PLS OXIMETRY 1: CPT

## 2020-11-29 PROCEDURE — 30233N1 TRANSFUSION OF NONAUTOLOGOUS RED BLOOD CELLS INTO PERIPHERAL VEIN, PERCUTANEOUS APPROACH: ICD-10-PCS | Performed by: EMERGENCY MEDICINE

## 2020-11-29 PROCEDURE — 81001 URINALYSIS AUTO W/SCOPE: CPT

## 2020-11-29 PROCEDURE — 82728 ASSAY OF FERRITIN: CPT

## 2020-11-29 PROCEDURE — 80053 COMPREHEN METABOLIC PANEL: CPT

## 2020-11-29 PROCEDURE — 93005 ELECTROCARDIOGRAM TRACING: CPT

## 2020-11-29 PROCEDURE — 86923 COMPATIBILITY TEST ELECTRIC: CPT

## 2020-11-29 PROCEDURE — 36415 COLL VENOUS BLD VENIPUNCTURE: CPT

## 2020-11-29 PROCEDURE — G0378 HOSPITAL OBSERVATION PER HR: HCPCS

## 2020-11-29 PROCEDURE — 83550 IRON BINDING TEST: CPT

## 2020-11-29 PROCEDURE — 85025 COMPLETE CBC W/AUTO DIFF WBC: CPT

## 2020-11-29 PROCEDURE — P9016 RBC LEUKOCYTES REDUCED: HCPCS

## 2020-11-29 PROCEDURE — 86850 RBC ANTIBODY SCREEN: CPT

## 2020-11-29 PROCEDURE — 99285 EMERGENCY DEPT VISIT HI MDM: CPT

## 2020-11-29 PROCEDURE — 85046 RETICYTE/HGB CONCENTRATE: CPT

## 2020-11-29 PROCEDURE — 83880 ASSAY OF NATRIURETIC PEPTIDE: CPT

## 2020-11-29 RX ORDER — METHYLPREDNISOLONE SODIUM SUCCINATE 125 MG/2ML
125 INJECTION, POWDER, LYOPHILIZED, FOR SOLUTION INTRAMUSCULAR; INTRAVENOUS ONCE
Status: COMPLETED | OUTPATIENT
Start: 2020-11-29 | End: 2020-11-29

## 2020-11-29 RX ORDER — AMLODIPINE BESYLATE 10 MG/1
10 TABLET ORAL DAILY
COMMUNITY
Start: 2020-09-01 | End: 2021-05-29

## 2020-11-29 RX ORDER — ALBUTEROL SULFATE 90 UG/1
2 AEROSOL, METERED RESPIRATORY (INHALATION) ONCE
Status: COMPLETED | OUTPATIENT
Start: 2020-11-29 | End: 2020-11-30

## 2020-11-29 RX ORDER — OXYCODONE HYDROCHLORIDE 5 MG/1
10 TABLET ORAL EVERY EVENING
COMMUNITY
Start: 2020-09-16

## 2020-11-29 RX ORDER — SODIUM CHLORIDE 9 MG/ML
INJECTION, SOLUTION INTRAVENOUS CONTINUOUS
Status: ACTIVE | OUTPATIENT
Start: 2020-11-29 | End: 2020-11-30

## 2020-11-29 RX ORDER — FUROSEMIDE 10 MG/ML
40 INJECTION INTRAMUSCULAR; INTRAVENOUS ONCE
Status: COMPLETED | OUTPATIENT
Start: 2020-11-29 | End: 2020-11-29

## 2020-11-29 RX ADMIN — FUROSEMIDE 40 MG: 10 INJECTION, SOLUTION INTRAMUSCULAR; INTRAVENOUS at 22:26

## 2020-11-29 RX ADMIN — METHYLPREDNISOLONE SODIUM SUCCINATE 125 MG: 125 INJECTION, POWDER, FOR SOLUTION INTRAMUSCULAR; INTRAVENOUS at 22:25

## 2020-11-29 ASSESSMENT — ENCOUNTER SYMPTOMS
VOMITING: 0
FEVER: 0

## 2020-11-29 ASSESSMENT — FIBROSIS 4 INDEX: FIB4 SCORE: 1.71

## 2020-11-30 PROBLEM — I50.9 ACUTE EXACERBATION OF CHF (CONGESTIVE HEART FAILURE) (HCC): Status: ACTIVE | Noted: 2020-11-30

## 2020-11-30 LAB
ANION GAP SERPL CALC-SCNC: 12 MMOL/L (ref 7–16)
BUN SERPL-MCNC: 50 MG/DL (ref 8–22)
CALCIUM SERPL-MCNC: 9.1 MG/DL (ref 8.5–10.5)
CHLORIDE SERPL-SCNC: 101 MMOL/L (ref 96–112)
CO2 SERPL-SCNC: 23 MMOL/L (ref 20–33)
CREAT SERPL-MCNC: 2.27 MG/DL (ref 0.5–1.4)
ERYTHROCYTE [DISTWIDTH] IN BLOOD BY AUTOMATED COUNT: 54.4 FL (ref 35.9–50)
FLUAV RNA SPEC QL NAA+PROBE: NEGATIVE
FLUBV RNA SPEC QL NAA+PROBE: NEGATIVE
GLUCOSE BLD-MCNC: 129 MG/DL (ref 65–99)
GLUCOSE SERPL-MCNC: 435 MG/DL (ref 65–99)
HCT VFR BLD AUTO: 23.9 % (ref 37–47)
HGB BLD-MCNC: 7.4 G/DL (ref 12–16)
MCH RBC QN AUTO: 30.8 PG (ref 27–33)
MCHC RBC AUTO-ENTMCNC: 32.3 G/DL (ref 33.6–35)
MCV RBC AUTO: 95.4 FL (ref 81.4–97.8)
PLATELET # BLD AUTO: 188 K/UL (ref 164–446)
PMV BLD AUTO: 9.1 FL (ref 9–12.9)
POTASSIUM SERPL-SCNC: 5.1 MMOL/L (ref 3.6–5.5)
RBC # BLD AUTO: 2.4 M/UL (ref 4.2–5.4)
RSV RNA SPEC QL NAA+PROBE: NEGATIVE
SARS-COV-2 RNA RESP QL NAA+PROBE: NOTDETECTED
SODIUM SERPL-SCNC: 136 MMOL/L (ref 135–145)
SPECIMEN SOURCE: NORMAL
WBC # BLD AUTO: 11.7 K/UL (ref 4.8–10.8)

## 2020-11-30 PROCEDURE — 94640 AIRWAY INHALATION TREATMENT: CPT

## 2020-11-30 PROCEDURE — 85027 COMPLETE CBC AUTOMATED: CPT

## 2020-11-30 PROCEDURE — G0378 HOSPITAL OBSERVATION PER HR: HCPCS

## 2020-11-30 PROCEDURE — 700102 HCHG RX REV CODE 250 W/ 637 OVERRIDE(OP): Performed by: STUDENT IN AN ORGANIZED HEALTH CARE EDUCATION/TRAINING PROGRAM

## 2020-11-30 PROCEDURE — 96372 THER/PROPH/DIAG INJ SC/IM: CPT

## 2020-11-30 PROCEDURE — 96376 TX/PRO/DX INJ SAME DRUG ADON: CPT

## 2020-11-30 PROCEDURE — 700102 HCHG RX REV CODE 250 W/ 637 OVERRIDE(OP): Performed by: HOSPITALIST

## 2020-11-30 PROCEDURE — 80048 BASIC METABOLIC PNL TOTAL CA: CPT

## 2020-11-30 PROCEDURE — 700101 HCHG RX REV CODE 250: Performed by: STUDENT IN AN ORGANIZED HEALTH CARE EDUCATION/TRAINING PROGRAM

## 2020-11-30 PROCEDURE — 99226 PR SUBSEQUENT OBSERVATION CARE,LEVEL III: CPT | Performed by: HOSPITALIST

## 2020-11-30 PROCEDURE — A9270 NON-COVERED ITEM OR SERVICE: HCPCS | Performed by: HOSPITALIST

## 2020-11-30 PROCEDURE — A9270 NON-COVERED ITEM OR SERVICE: HCPCS | Performed by: STUDENT IN AN ORGANIZED HEALTH CARE EDUCATION/TRAINING PROGRAM

## 2020-11-30 PROCEDURE — 700111 HCHG RX REV CODE 636 W/ 250 OVERRIDE (IP): Performed by: STUDENT IN AN ORGANIZED HEALTH CARE EDUCATION/TRAINING PROGRAM

## 2020-11-30 PROCEDURE — 82962 GLUCOSE BLOOD TEST: CPT

## 2020-11-30 PROCEDURE — A9270 NON-COVERED ITEM OR SERVICE: HCPCS | Performed by: EMERGENCY MEDICINE

## 2020-11-30 PROCEDURE — 700102 HCHG RX REV CODE 250 W/ 637 OVERRIDE(OP): Performed by: EMERGENCY MEDICINE

## 2020-11-30 PROCEDURE — 700105 HCHG RX REV CODE 258: Performed by: EMERGENCY MEDICINE

## 2020-11-30 RX ORDER — INSULIN GLARGINE 100 [IU]/ML
0.2 INJECTION, SOLUTION SUBCUTANEOUS EVERY EVENING
Status: DISCONTINUED | OUTPATIENT
Start: 2020-11-30 | End: 2020-12-03 | Stop reason: HOSPADM

## 2020-11-30 RX ORDER — ROSUVASTATIN CALCIUM 10 MG/1
10 TABLET, COATED ORAL EVERY EVENING
Status: DISCONTINUED | OUTPATIENT
Start: 2020-11-30 | End: 2020-12-03 | Stop reason: HOSPADM

## 2020-11-30 RX ORDER — HYDROXYZINE HYDROCHLORIDE 25 MG/1
25 TABLET, FILM COATED ORAL 3 TIMES DAILY PRN
Status: DISCONTINUED | OUTPATIENT
Start: 2020-11-30 | End: 2020-12-03 | Stop reason: HOSPADM

## 2020-11-30 RX ORDER — LEVOTHYROXINE SODIUM 0.07 MG/1
75 TABLET ORAL
Status: DISCONTINUED | OUTPATIENT
Start: 2020-11-30 | End: 2020-12-03 | Stop reason: HOSPADM

## 2020-11-30 RX ORDER — AZITHROMYCIN 250 MG/1
500 TABLET, FILM COATED ORAL
Status: DISCONTINUED | OUTPATIENT
Start: 2020-11-30 | End: 2020-12-01 | Stop reason: ALTCHOICE

## 2020-11-30 RX ORDER — DEXTROSE MONOHYDRATE 25 G/50ML
50 INJECTION, SOLUTION INTRAVENOUS
Status: DISCONTINUED | OUTPATIENT
Start: 2020-11-30 | End: 2020-12-03 | Stop reason: HOSPADM

## 2020-11-30 RX ORDER — IPRATROPIUM BROMIDE AND ALBUTEROL SULFATE 2.5; .5 MG/3ML; MG/3ML
3 SOLUTION RESPIRATORY (INHALATION)
Status: DISCONTINUED | OUTPATIENT
Start: 2020-11-30 | End: 2020-11-30

## 2020-11-30 RX ORDER — NICOTINE 21 MG/24HR
21 PATCH, TRANSDERMAL 24 HOURS TRANSDERMAL
Status: COMPLETED | OUTPATIENT
Start: 2020-11-30 | End: 2020-11-30

## 2020-11-30 RX ORDER — AMLODIPINE BESYLATE 10 MG/1
10 TABLET ORAL DAILY
Status: DISCONTINUED | OUTPATIENT
Start: 2020-11-30 | End: 2020-12-03 | Stop reason: HOSPADM

## 2020-11-30 RX ORDER — HEPARIN SODIUM 5000 [USP'U]/ML
5000 INJECTION, SOLUTION INTRAVENOUS; SUBCUTANEOUS EVERY 8 HOURS
Status: DISCONTINUED | OUTPATIENT
Start: 2020-11-30 | End: 2020-12-03 | Stop reason: HOSPADM

## 2020-11-30 RX ORDER — IPRATROPIUM BROMIDE AND ALBUTEROL SULFATE 2.5; .5 MG/3ML; MG/3ML
3 SOLUTION RESPIRATORY (INHALATION)
Status: DISCONTINUED | OUTPATIENT
Start: 2020-11-30 | End: 2020-12-01

## 2020-11-30 RX ORDER — FUROSEMIDE 10 MG/ML
40 INJECTION INTRAMUSCULAR; INTRAVENOUS
Status: COMPLETED | OUTPATIENT
Start: 2020-11-30 | End: 2020-12-02

## 2020-11-30 RX ORDER — HYDROCODONE BITARTRATE AND ACETAMINOPHEN 5; 325 MG/1; MG/1
1-2 TABLET ORAL EVERY 6 HOURS PRN
Status: DISCONTINUED | OUTPATIENT
Start: 2020-11-30 | End: 2020-12-03 | Stop reason: HOSPADM

## 2020-11-30 RX ORDER — AMLODIPINE BESYLATE 5 MG/1
5 TABLET ORAL DAILY
Status: DISCONTINUED | OUTPATIENT
Start: 2020-11-30 | End: 2020-12-01

## 2020-11-30 RX ORDER — IPRATROPIUM BROMIDE AND ALBUTEROL SULFATE 2.5; .5 MG/3ML; MG/3ML
3 SOLUTION RESPIRATORY (INHALATION)
Status: DISCONTINUED | OUTPATIENT
Start: 2020-11-30 | End: 2020-12-03 | Stop reason: HOSPADM

## 2020-11-30 RX ORDER — AMITRIPTYLINE HYDROCHLORIDE 50 MG/1
50 TABLET, FILM COATED ORAL NIGHTLY
Status: DISCONTINUED | OUTPATIENT
Start: 2020-11-30 | End: 2020-12-03 | Stop reason: HOSPADM

## 2020-11-30 RX ADMIN — AMLODIPINE BESYLATE 10 MG: 10 TABLET ORAL at 05:53

## 2020-11-30 RX ADMIN — LEVOTHYROXINE SODIUM 75 MCG: 0.07 TABLET ORAL at 05:54

## 2020-11-30 RX ADMIN — IPRATROPIUM BROMIDE AND ALBUTEROL SULFATE 3 ML: 2.5; .5 SOLUTION RESPIRATORY (INHALATION) at 01:49

## 2020-11-30 RX ADMIN — IPRATROPIUM BROMIDE AND ALBUTEROL SULFATE 3 ML: 2.5; .5 SOLUTION RESPIRATORY (INHALATION) at 10:14

## 2020-11-30 RX ADMIN — ROSUVASTATIN CALCIUM 10 MG: 10 TABLET, FILM COATED ORAL at 18:34

## 2020-11-30 RX ADMIN — INSULIN GLARGINE 19 UNITS: 100 INJECTION, SOLUTION SUBCUTANEOUS at 21:59

## 2020-11-30 RX ADMIN — ALBUTEROL SULFATE 2 PUFF: 90 AEROSOL, METERED RESPIRATORY (INHALATION) at 01:18

## 2020-11-30 RX ADMIN — SODIUM CHLORIDE: 9 INJECTION, SOLUTION INTRAVENOUS at 00:16

## 2020-11-30 RX ADMIN — HYDROCODONE BITARTRATE AND ACETAMINOPHEN 1 TABLET: 5; 325 TABLET ORAL at 19:54

## 2020-11-30 RX ADMIN — IPRATROPIUM BROMIDE AND ALBUTEROL SULFATE 3 ML: 2.5; .5 SOLUTION RESPIRATORY (INHALATION) at 06:37

## 2020-11-30 RX ADMIN — AMLODIPINE BESYLATE 5 MG: 5 TABLET ORAL at 22:03

## 2020-11-30 RX ADMIN — FUROSEMIDE 40 MG: 10 INJECTION, SOLUTION INTRAMUSCULAR; INTRAVENOUS at 05:54

## 2020-11-30 RX ADMIN — HEPARIN SODIUM 5000 UNITS: 5000 INJECTION INTRAVENOUS; SUBCUTANEOUS at 05:53

## 2020-11-30 RX ADMIN — IPRATROPIUM BROMIDE AND ALBUTEROL SULFATE 3 ML: 2.5; .5 SOLUTION RESPIRATORY (INHALATION) at 22:17

## 2020-11-30 RX ADMIN — IPRATROPIUM BROMIDE AND ALBUTEROL SULFATE 3 ML: 2.5; .5 SOLUTION RESPIRATORY (INHALATION) at 13:55

## 2020-11-30 RX ADMIN — TRAZODONE HYDROCHLORIDE 150 MG: 100 TABLET ORAL at 22:02

## 2020-11-30 RX ADMIN — AMITRIPTYLINE HYDROCHLORIDE 50 MG: 50 TABLET, FILM COATED ORAL at 22:03

## 2020-11-30 RX ADMIN — NICOTINE TRANSDERMAL SYSTEM 21 MG: 21 PATCH, EXTENDED RELEASE TRANSDERMAL at 05:54

## 2020-11-30 RX ADMIN — AZITHROMYCIN MONOHYDRATE 500 MG: 250 TABLET ORAL at 22:07

## 2020-11-30 RX ADMIN — AZITHROMYCIN MONOHYDRATE 500 MG: 250 TABLET ORAL at 01:16

## 2020-11-30 ASSESSMENT — ENCOUNTER SYMPTOMS
PSYCHIATRIC NEGATIVE: 1
EYES NEGATIVE: 1
FEVER: 0
CHILLS: 0
DIAPHORESIS: 0
MUSCULOSKELETAL NEGATIVE: 1
HEMOPTYSIS: 0
SHORTNESS OF BREATH: 1
WEIGHT LOSS: 0
COUGH: 1
SPUTUM PRODUCTION: 1
NEUROLOGICAL NEGATIVE: 1
WHEEZING: 0
CARDIOVASCULAR NEGATIVE: 1
GASTROINTESTINAL NEGATIVE: 1

## 2020-11-30 ASSESSMENT — LIFESTYLE VARIABLES
TOTAL SCORE: 0
EVER_SMOKED: YES
EVER HAD A DRINK FIRST THING IN THE MORNING TO STEADY YOUR NERVES TO GET RID OF A HANGOVER: NO
ON A TYPICAL DAY WHEN YOU DRINK ALCOHOL HOW MANY DRINKS DO YOU HAVE: 0
DOES PATIENT WANT TO STOP DRINKING: NO
HAVE PEOPLE ANNOYED YOU BY CRITICIZING YOUR DRINKING: NO
EVER FELT BAD OR GUILTY ABOUT YOUR DRINKING: NO
CONSUMPTION TOTAL: NEGATIVE
TOTAL SCORE: 0
HAVE YOU EVER FELT YOU SHOULD CUT DOWN ON YOUR DRINKING: NO
HOW MANY TIMES IN THE PAST YEAR HAVE YOU HAD 5 OR MORE DRINKS IN A DAY: 0
ALCOHOL_USE: NO
TOTAL SCORE: 0
AVERAGE NUMBER OF DAYS PER WEEK YOU HAVE A DRINK CONTAINING ALCOHOL: 0

## 2020-11-30 ASSESSMENT — COGNITIVE AND FUNCTIONAL STATUS - GENERAL
TURNING FROM BACK TO SIDE WHILE IN FLAT BAD: A LITTLE
DRESSING REGULAR UPPER BODY CLOTHING: A LITTLE
SUGGESTED CMS G CODE MODIFIER MOBILITY: CL
WALKING IN HOSPITAL ROOM: A LOT
EATING MEALS: A LITTLE
STANDING UP FROM CHAIR USING ARMS: A LOT
SUGGESTED CMS G CODE MODIFIER DAILY ACTIVITY: CK
DAILY ACTIVITIY SCORE: 16
MOVING TO AND FROM BED TO CHAIR: A LOT
PERSONAL GROOMING: A LITTLE
MOVING FROM LYING ON BACK TO SITTING ON SIDE OF FLAT BED: A LOT
HELP NEEDED FOR BATHING: A LOT
MOBILITY SCORE: 13
DRESSING REGULAR LOWER BODY CLOTHING: A LITTLE
TOILETING: A LOT
CLIMB 3 TO 5 STEPS WITH RAILING: A LOT

## 2020-11-30 NOTE — ASSESSMENT & PLAN NOTE
Admit to medical floor unit  Start azithromycin 500 mg every 24 hours for likely underlying infection (productive cough and neutrophilic leukocytosis)  Noticed to have wheezing, will start DuoNebs every 4 hours  Oxygen as needed, keep O2 over 90%

## 2020-11-30 NOTE — ASSESSMENT & PLAN NOTE
Patient states that she is actively trying to quit  At request of patient, will give nicotine patch

## 2020-11-30 NOTE — ASSESSMENT & PLAN NOTE
Hemoglobin to be transfused 1 unit of packed red blood cell  Iron studies consistent with inflammatory disease  Recheck CBC in a.m., no suspicion of active bleed at this time can continue heparin subcu

## 2020-11-30 NOTE — ED NOTES
Med rec completed per pt at bedside  Allergies reviewed  Pt reports she had a week long course of antibiotics for a kidney infection a couple of weeks ago  Unknown what antibiotic it was  Unable to verify with pharmacy, as they are currently closed

## 2020-11-30 NOTE — ASSESSMENT & PLAN NOTE
Noted to have EF of 80% as of July 2020  In slightly fluid overload state (edema and x-ray shortness of breath and pitting edema)  Will give lasix 40 mg IV daily, monitor creatinine

## 2020-11-30 NOTE — H&P
Hospital Medicine History & Physical Note    Date of Service  11/29/2020    Primary Care Physician  Crystal Ramos M.D.    Consultants  None    Code Status  Prior    Chief Complaint  Chief Complaint   Patient presents with   • Shortness of Breath     Increasing SOB past 2 weeks, irena since yesterday, on %L O2 at home, now cannot get to BR without becoming severely SOB. Pt reports cough with green and bloody sputum.       History of Presenting Illness  70 y.o. female who presented 11/29/2020 with shortness of breath.  Patient has reported shortness of breath upon exertion for the last 3 weeks and it has been progressively getting worse.  She also notes that she has had a productive cough during this time too, sometimes clear to a creamy color.  She also notes that during this time she has noted that her urine has a funny smell to it.  As her symptoms were not improved she decided to come to the ED for evaluation.    In ED, patient found to have elevated blood pressure and tachypnea. Remarkable labs include  Neutrophilic leukocytosis, Hgb 6.0, CKD.  Chest x-ray shows pulmonary edema and infiltrate and cardiomegaly.    Review of Systems  Review of Systems   Constitutional: Negative for chills, diaphoresis, fever, malaise/fatigue and weight loss.   HENT: Negative.    Eyes: Negative.    Respiratory: Positive for cough, sputum production and shortness of breath. Negative for hemoptysis and wheezing.    Cardiovascular: Negative.    Gastrointestinal: Negative.    Genitourinary: Negative.    Musculoskeletal: Negative.    Skin: Negative.    Neurological: Negative.    Endo/Heme/Allergies: Negative.    Psychiatric/Behavioral: Negative.          Past Medical History   has a past medical history of Arthritis, ASTHMA, Backpain, Breath shortness, Bronchitis, Congestive heart failure (HCC) (2013), COPD, Diabetes, Disorder of thyroid, Fall, Fibromyalgia, GERD (gastroesophageal reflux disease), Heart burn, Hepatitis C, Hypertension,  Indigestion, Infectious disease, Neuropathy, On home oxygen therapy, Other specified symptom associated with female genital organs, Pain (9/13/2016), PND (post-nasal drip), Pneumonia, Psychiatric problem (9/13/2016), RLS (restless legs syndrome), Sleep apnea, and Unspecified urinary incontinence.    Surgical History   has a past surgical history that includes gyn surgery; other orthopedic surgery; other orthopedic surgery; us-needle core bx-breast panel; lumpectomy (Left); pr inj dx/ther agnt paravert facet joint, bruce* (Left, 7/10/2015); pr inj dx/ther agnt paravert facet joint, bruce* (7/10/2015); pr inj dx/ther agnt paravert facet joint, bruce* (7/10/2015); pr inj(s) nerve block other periphal (7/10/2015); pr inj dx/ther agnt paravert facet joint, bruce* (Left, 7/17/2015); pr inj dx/ther agnt paravert facet joint, bruce* (7/17/2015); pr inj dx/ther agnt paravert facet joint, bruce* (7/17/2015); pr inj(s) nerve block other periphal (7/17/2015); pr dstr nrolytc agnt parverteb fct sngl lmbr/sacral (Left, 10/2/2015); pr dstr nrolytc agnt parverteb fct addl lmbr/sacral (10/2/2015); pr inject rx other periph nerve (10/2/2015); pr dstr nrolytc agnt parverteb fct addl lmbr/sacral (10/2/2015); pr dstr nrolytc agnt parverteb fct sngl lmbr/sacral (Right, 11/6/2015); pr dstr nrolytc agnt parverteb fct addl lmbr/sacral (11/6/2015); pr inject rx other periph nerve (11/6/2015); pr dstr nrolytc agnt parverteb fct addl lmbr/sacral (11/6/2015); pr dstr nrolytc agnt parverteb fct sngl lmbr/sacral (Left, 9/16/2016); pr dstr nrolytc agnt parverteb fct addl lmbr/sacral (9/16/2016); pr dstr nrolytc agnt parverteb fct addl lmbr/sacral (9/16/2016); pr fluoroscopic guidance needle placement (9/16/2016); and ankle orif (Left, 5/8/2017).     Family History  family history includes Alcohol/Drug in her mother; Cancer in her brother and father; Diabetes in her brother and maternal grandmother; Heart Disease in her mother and paternal grandmother; Lung Disease  "in her mother; Stroke in her paternal grandmother.     Social History   reports that she has been smoking cigarettes. She has a 12.50 pack-year smoking history. She has never used smokeless tobacco. She reports that she does not drink alcohol or use drugs.    Allergies  Allergies   Allergen Reactions   • Doxycycline Itching   • Vitamin C Diarrhea     \"violent diarrhea\"   • Codeine Nausea     Severe stomach pain   • Gabapentin Palpitations     Gets shaky and falls    • Keflex Rash     Severe rash, but TOLERATES PENICILLINS, Rocephin    • Lyrica Nausea     Nausea, dizzy   • Powders    • Sudafed Nausea and Unspecified     Chest pain, nausea       Medications  Prior to Admission Medications   Prescriptions Last Dose Informant Patient Reported? Taking?   DULoxetine (CYMBALTA) 60 MG Cap DR Particles delayed-release capsule  Historical No No   Sig: TAKE 1 CAPSULE BY MOUTH DAILY   Diclofenac Sodium 1 % Gel  Historical No No   Sig: Apply 2 g to skin as directed 2 times a day as needed (for pain).   TRELEGY ELLIPTA 100-62.5-25 MCG/INH AEROSOL POWDER, BREATH ACTIVATED  Historical No No   Sig: INHALE 1 PUFF BY MOUTH DAILY   albuterol 108 (90 Base) MCG/ACT Aero Soln inhalation aerosol  Historical Yes No   Sig: Inhale 2 Puffs by mouth every four hours as needed for Shortness of Breath.   amLODIPine (NORVASC) 5 MG Tab   No No   Sig: Take 1 Tab by mouth every day.   amitriptyline (ELAVIL) 50 MG Tab  Historical Yes No   Sig: Take 100 mg by mouth every evening.   cyclobenzaprine (FLEXERIL) 10 MG Tab  Historical No No   Sig: Take 1 Tab by mouth every bedtime.   furosemide (LASIX) 80 MG Tab   No No   Sig: Take 1 Tab by mouth 2 Times a Day.   hydrALAZINE (APRESOLINE) 25 MG Tab  Historical No No   Sig: Take 1 Tab by mouth 3 times a day.   hydrOXYzine HCl (ATARAX) 25 MG Tab  Historical No No   Sig: Take 1 Tab by mouth 3 times a day as needed for Anxiety.   insulin glargine (LANTUS) 100 UNIT/ML Solution  Historical Yes No   Sig: Inject 20 " Units as instructed every evening.   ipratropium-albuterol (DUONEB) 0.5-2.5 (3) MG/3ML nebulizer solution  Historical No No   Sig: 3 mL by Nebulization route every 6 hours as needed for Shortness of Breath.   lactulose 10 GM/15ML Solution  Historical Yes No   Sig: Take 20 g by mouth 2 times a day as needed.   levothyroxine (SYNTHROID) 75 MCG Tab   No No   Sig: Take 1 Tab by mouth Every morning on an empty stomach.   lidocaine (XYLOCAINE) 5 % Ointment  Historical Yes No   Sig: Apply 1 Each to affected area(s) as needed.   nystatin/triamcinolone (MYCOLOG) 926106-3.1 UNIT/GM-% Cream   No No   Sig: APPLY A THIN LAYER TO FUNGAL RASH ONCE DAILY AS NEEDED   oxyCODONE immediate release (ROXICODONE) 10 MG immediate release tablet  Historical Yes No   Sig: Take 10 mg by mouth 3 times a day as needed for Moderate Pain.   rosuvastatin (CRESTOR) 10 MG Tab  Historical No No   Sig: TAKE 1 TABLET BY MOUTH EVERY EVENING   traZODone (DESYREL) 150 MG Tab  Historical Yes No   Sig: Take 300 mg by mouth 2 times a day.      Facility-Administered Medications: None       Physical Exam  Temp:  [37.1 °C (98.8 °F)-37.2 °C (99 °F)] 37.1 °C (98.8 °F)  Pulse:  [79-88] 79  Resp:  [24-25] 25  BP: (127-182)/(56-76) 182/76  SpO2:  [92 %-99 %] 99 %    Physical Exam  Constitutional:       Appearance: Normal appearance. She is obese.   HENT:      Head: Normocephalic.   Cardiovascular:      Rate and Rhythm: Normal rate and regular rhythm.      Pulses: Normal pulses.      Heart sounds: Normal heart sounds.   Pulmonary:      Effort: Pulmonary effort is normal.      Breath sounds: Wheezing present.      Comments: Saturating > 95% on NC   Abdominal:      General: Abdomen is flat.   Musculoskeletal:      Comments: BL pitting edema in lower extremities    Skin:     General: Skin is warm.   Neurological:      General: No focal deficit present.      Mental Status: She is alert and oriented to person, place, and time.           Laboratory:  Recent Labs      11/29/20 2130   WBC 12.2*   RBC 1.99*   HEMOGLOBIN 6.0*   HEMATOCRIT 19.6*   MCV 98.5*   MCH 30.2   MCHC 30.6*   RDW 50.0   PLATELETCT 188   MPV 9.1     Recent Labs     11/29/20 2130   SODIUM 137   POTASSIUM 4.8   CHLORIDE 105   CO2 22   GLUCOSE 240*   BUN 44*   CREATININE 2.24*   CALCIUM 8.7     Recent Labs     11/29/20 2130   ALTSGPT 30   ASTSGOT 18   ALKPHOSPHAT 65   TBILIRUBIN 0.4   GLUCOSE 240*         Recent Labs     11/29/20 2130   NTPROBNP 1985*         Recent Labs     11/29/20 2130   TROPONINT 67*       Imaging:  DX-CHEST-PORTABLE (1 VIEW)   Final Result         1.  Pulmonary edema and/or infiltrates.   2.  Cardiomegaly            Assessment/Plan:  I anticipate this patient is appropriate for observation status at this time.    * Acute exacerbation of chronic obstructive pulmonary disease (COPD) ([facility])  Assessment & Plan  Admit to medical floor unit  Start azithromycin 500 mg every 24 hours for likely underlying infection (productive cough and neutrophilic leukocytosis)  Noticed to have wheezing, will start DuoNebs every 4 hours  Oxygen as needed, keep O2 over 90%      DM type 2, uncontrolled, with renal complications ([facility])- (present on admission)  Assessment & Plan  Sliding scale prn    Acute exacerbation of CHF (congestive heart failure) ([facility])  Assessment & Plan  Noted to have EF of 80% as of July 2020  In slightly fluid overload state (edema and x-ray shortness of breath and pitting edema)  Will give lasix 40 mg IV daily, monitor creatinine     Symptomatic anemia  Assessment & Plan  Hemoglobin to be transfused 1 unit of packed red blood cell  Iron studies consistent with inflammatory disease  Recheck CBC in a.m., no suspicion of active bleed at this time can continue heparin subcu      Hypothyroidism- (present on admission)  Assessment & Plan  Synthroid 75 mcg daily    Tobacco abuse  Assessment & Plan  Patient states that she is actively trying to quit  At request of patient, will give nicotine  patch    Morbid obesity (HCC)- (present on admission)  Assessment & Plan  Patient counseled on weight loss nutrition and healthy lifestyle

## 2020-11-30 NOTE — ED NOTES
Attending Hospitalist today is Dr Vilchis starting at 0700. Please call this Physician for orders, updates and questions.

## 2020-11-30 NOTE — ED TRIAGE NOTES
Priya Callahan  70 y.o.  female  Chief Complaint   Patient presents with   • Shortness of Breath     Increasing SOB past 2 weeks, irena since yesterday, on %L O2 at home, now cannot get to BR without becoming severely SOB. Pt reports cough with green and bloody sputum.

## 2020-11-30 NOTE — DISCHARGE PLANNING
Care Transition Team Assessment  Assessment information obtained from prior admission due to covid-19      RN CM spoke with patient at the bedside to complete transition assessment.  This RN CM familiar with patient from prior admission.  Patient lives @ [facility] and has [facility] that comes to see her.  She has a walker and wheelchair and uses both and is on Home O2, which is provided by [facility].  Of note, she is possibly going to need OP dialysis set up before discharge if her kidneys do not recover.  She gets her medications filled at [facility] in [place].  She requires quite a bit of assistance for her ADLs, especially since she has been sick.       DC Plan:  [facility] w/ [facility]     Information Source  Orientation : Oriented x 4  Information Given By: Patient  Who is responsible for making decisions for patient? : Patient     Readmission Evaluation  Is this a readmission?: Yes - unplanned readmission  Why do you think you were readmitted?: On too many b/p meds  Was an appointment arranged for you prior to discharge?: Yes, attended appointment  Were there new prescriptions you were supposed to fill after you were discharged?: Yes, prescriptions filled  Did you understand your discharge instructions?: Yes  Did you have enough support after your last discharge?: Yes     Elopement Risk  Legal Hold: No  Ambulatory or Self Mobile in Wheelchair: No-Not an Elopement Risk  Disoriented: No  Psychiatric Symptoms: None  History of Wandering: No  Elopement this Admit: No  Vocalizing Wanting to Leave: No  Displays Behaviors, Body Language Wanting to Leave: No-Not at Risk for Elopement  Elopement Risk: Not at Risk for Elopement     Interdisciplinary Discharge Planning  Primary Care Physician: Dr. Crystal Ramos  Lives with - Patient's Self Care Capacity: Attendant / Paid Care Giver  Patient or legal guardian wants to designate a caregiver (see row info): No  Support Systems:  / , Home Care  Staff  Housing / Facility: Assisted Living Residence  Name of Care Facility: [facility]  Do You Take your Prescribed Medications Regularly: Yes  Able to Return to Previous ADL's: Yes  Mobility Issues: No  Prior Services: Skilled Home Health Services, Continuous (24 Hour) Care Giving Per Service, Housekeeping / Homemaker Services  Patient Expects to be Discharged to:: [facility] w/ resumption of [facility]  Assistance Needed: Yes  Durable Medical Equipment: Home Oxygen, Walker, Other - Specify(wheelchair)  DME Provider / Phone: [facility]     Discharge Preparedness  What is your plan after discharge?: Home health care  What are your discharge supports?: Other (comment)(friends, facility and HH staff)  Prior Functional Level: Needs Assist with Activities of Daily Living, Needs Assist with Medication Management, Uses Walker, Uses Wheelchair  Difficulity with ADLs: Bathing, Dressing, Walking  Difficulty with ADLs Comment: [facility] staff assists  Difficulity with IADLs: Cooking, Driving, Laundry, Managing medication, Shopping  Difficulity with IADL Comments: [facility] staff assists; uses [facility] transportation van to get places.     Functional Assesment  Prior Functional Level: Needs Assist with Activities of Daily Living, Needs Assist with Medication Management, Uses Walker, Uses Wheelchair     Finances  Financial Barriers to Discharge: No  Prescription Coverage: Yes     Vision / Hearing Impairment  Vision Impairment : No  Right Eye Vision: Impaired, Wears Glasses  Left Eye Vision: Impaired, Wears Glasses  Hearing Impairment : No           Advance Directive  Advance Directive?: DPOA for Health Care  Durable Power of  Name and Contact : Uzair Zavala, brother, [phone]     Domestic Abuse  Have you ever been the victim of abuse or violence?: No  Physical Abuse or Sexual Abuse: No  Verbal Abuse or Emotional Abuse: No  Possible Abuse Reported to:: Not Applicable     Psychological Assessment  History of  Substance Abuse: None  History of Psychiatric Problems: Yes  Non-compliant with Treatment: No     Discharge Risks or Barriers  Discharge risks or barriers?: No  Patient risk factors: Complex medical needs     Anticipated Discharge Information  Anticipated discharge disposition: Assisted living, [facility]  Discharge Address: [address] [place] [address] [id number]    [place] [id number]  Discharge Contact Phone Number: [phone]

## 2020-11-30 NOTE — ED PROVIDER NOTES
ED Provider Note    CHIEF COMPLAINT  Chief Complaint   Patient presents with   • Shortness of Breath     Increasing SOB past 2 weeks, irena since yesterday, on %L O2 at home, now cannot get to BR without becoming severely SOB. Pt reports cough with green and bloody sputum.       HPI  Priya Callahan is a 70 y.o. female with history of COPD, diabetes, hep C, hypertension, heart failure and CKD here with chief complaint of worsening shortness of breath. Patient is on 5L of home O2 per baseline and reports this is no changed, however she does report that when walking she is feeling more short of breath than normal.  Patient is unable to identify if she has any orthopnea because she sleeps in the recliner as lying flat hurts her back.  Patient denies any associated lower extremity edema.  Patient reports an associated cough productive of yellow-green sputum.  She reports once there was a speck of what she thought was blood but this is not recurred.  Patient denies any associated chest pain.  Patient denies any subjective fever chills or body aches but apparently per EMS she had a temperature of 100.1.  Patient denies any hematochezia melena.  Patient does have occasional nosebleeds.  Patient also reports some mild foul-smelling urine and dysuria.    REVIEW OF SYSTEMS  Review of Systems   Constitutional: Positive for malaise/fatigue. Negative for fever.   HENT: Positive for nosebleeds.    Gastrointestinal: Negative for vomiting.   Genitourinary: Positive for dysuria, frequency and urgency.     See HPI for further details. All other systems are negative.     PAST MEDICAL HISTORY   has a past medical history of Arthritis, ASTHMA, Backpain, Breath shortness, Bronchitis, Congestive heart failure (HCC) (2013), COPD, Diabetes, Disorder of thyroid, Fall, Fibromyalgia, GERD (gastroesophageal reflux disease), Heart burn, Hepatitis C, Hypertension, Indigestion, Infectious disease, Neuropathy, On home oxygen therapy, Other  specified symptom associated with female genital organs, Pain (2016), PND (post-nasal drip), Pneumonia, Psychiatric problem (2016), RLS (restless legs syndrome), Sleep apnea, and Unspecified urinary incontinence.    SOCIAL HISTORY  Social History     Tobacco Use   • Smoking status: Current Every Day Smoker     Packs/day: 0.25     Years: 50.00     Pack years: 12.50     Types: Cigarettes     Last attempt to quit: 3/20/2019     Years since quittin.6   • Smokeless tobacco: Never Used   Substance and Sexual Activity   • Alcohol use: No     Alcohol/week: 0.0 oz   • Drug use: No   • Sexual activity: Never       SURGICAL HISTORY   has a past surgical history that includes gyn surgery; other orthopedic surgery; other orthopedic surgery; us-needle core bx-breast panel; lumpectomy (Left); inj dx/ther agnt paravert facet joint, bruce* (Left, 7/10/2015); inj dx/ther agnt paravert facet joint, bruce* (7/10/2015); inj dx/ther agnt paravert facet joint, bruce* (7/10/2015); inj(s) nerve block other periphal (7/10/2015); inj dx/ther agnt paravert facet joint, bruce* (Left, 2015); inj dx/ther agnt paravert facet joint, bruce* (2015); inj dx/ther agnt paravert facet joint, bruce* (2015); inj(s) nerve block other periphal (2015); dstr nrolytc agnt parverteb fct sngl lmbr/sacral (Left, 10/2/2015); dstr nrolytc agnt parverteb fct addl lmbr/sacral (10/2/2015); inject rx other periph nerve (10/2/2015); dstr nrolytc agnt parverteb fct addl lmbr/sacral (10/2/2015); dstr nrolytc agnt parverteb fct sngl lmbr/sacral (Right, 2015); dstr nrolytc agnt parverteb fct addl lmbr/sacral (2015); inject rx other periph nerve (2015); dstr nrolytc agnt parverteb fct addl lmbr/sacral (2015); dstr nrolytc agnt parverteb fct sngl lmbr/sacral (Left, 2016); dstr nrolytc agnt parverteb fct addl lmbr/sacral (2016); dstr nrolytc agnt parverteb fct addl lmbr/sacral (2016); fluoroscopic guidance needle placement  "(9/16/2016); and ankle orif (Left, 5/8/2017).    CURRENT MEDICATIONS  Home Medications    **Home medications have not yet been reviewed for this encounter**         ALLERGIES  Allergies   Allergen Reactions   • Doxycycline Itching   • Vitamin C Diarrhea     \"violent diarrhea\"   • Codeine Nausea     Severe stomach pain   • Gabapentin Palpitations     Gets shaky and falls    • Keflex Rash     Severe rash, but TOLERATES PENICILLINS, Rocephin    • Lyrica Nausea     Nausea, dizzy   • Powders    • Sudafed Nausea and Unspecified     Chest pain, nausea       PHYSICAL EXAM  Vitals:    11/29/20 2114   BP: (!) 182/76   Pulse: 79   Resp: (!) 25   Temp: 37.1 °C (98.8 °F)   SpO2: 99%       Physical Exam   Constitutional: She is oriented to person, place, and time. She appears well-developed and well-nourished.   HENT:   Head: Normocephalic and atraumatic.   Eyes: Conjunctivae are normal.   Neck: Normal range of motion. Neck supple.   Cardiovascular: Normal rate and regular rhythm.   Pulmonary/Chest: Effort normal and breath sounds normal.   Very mild diffuse wheezing   Abdominal: Soft. Bowel sounds are normal. She exhibits no distension. There is no abdominal tenderness. There is no rebound.   Neurological: She is alert and oriented to person, place, and time.   Skin: Skin is warm and dry. No rash noted.   Psychiatric: She has a normal mood and affect. Her behavior is normal.       DIAGNOSTIC STUDIES / PROCEDURES    EKG  See below    LABS  Results for orders placed or performed during the hospital encounter of 11/29/20   CBC WITH DIFFERENTIAL   Result Value Ref Range    WBC 12.2 (H) 4.8 - 10.8 K/uL    RBC 1.99 (L) 4.20 - 5.40 M/uL    Hemoglobin 6.0 (L) 12.0 - 16.0 g/dL    Hematocrit 19.6 (L) 37.0 - 47.0 %    MCV 98.5 (H) 81.4 - 97.8 fL    MCH 30.2 27.0 - 33.0 pg    MCHC 30.6 (L) 33.6 - 35.0 g/dL    RDW 50.0 35.9 - 50.0 fL    Platelet Count 188 164 - 446 K/uL    MPV 9.1 9.0 - 12.9 fL    Neutrophils-Polys 86.20 (H) 44.00 - 72.00 % "    Lymphocytes 4.70 (L) 22.00 - 41.00 %    Monocytes 6.70 0.00 - 13.40 %    Eosinophils 1.10 0.00 - 6.90 %    Basophils 0.20 0.00 - 1.80 %    Immature Granulocytes 1.10 (H) 0.00 - 0.90 %    Nucleated RBC 0.20 /100 WBC    Neutrophils (Absolute) 10.49 (H) 2.00 - 7.15 K/uL    Lymphs (Absolute) 0.57 (L) 1.00 - 4.80 K/uL    Monos (Absolute) 0.81 0.00 - 0.85 K/uL    Eos (Absolute) 0.13 0.00 - 0.51 K/uL    Baso (Absolute) 0.03 0.00 - 0.12 K/uL    Immature Granulocytes (abs) 0.13 (H) 0.00 - 0.11 K/uL    NRBC (Absolute) 0.02 K/uL   COMP METABOLIC PANEL   Result Value Ref Range    Sodium 137 135 - 145 mmol/L    Potassium 4.8 3.6 - 5.5 mmol/L    Chloride 105 96 - 112 mmol/L    Co2 22 20 - 33 mmol/L    Anion Gap 10.0 7.0 - 16.0    Glucose 240 (H) 65 - 99 mg/dL    Bun 44 (H) 8 - 22 mg/dL    Creatinine 2.24 (H) 0.50 - 1.40 mg/dL    Calcium 8.7 8.5 - 10.5 mg/dL    AST(SGOT) 18 12 - 45 U/L    ALT(SGPT) 30 2 - 50 U/L    Alkaline Phosphatase 65 30 - 99 U/L    Total Bilirubin 0.4 0.1 - 1.5 mg/dL    Albumin 3.4 3.2 - 4.9 g/dL    Total Protein 6.5 6.0 - 8.2 g/dL    Globulin 3.1 1.9 - 3.5 g/dL    A-G Ratio 1.1 g/dL   proBrain Natriuretic Peptide, NT   Result Value Ref Range    NT-proBNP 1985 (H) 0 - 125 pg/mL   TROPONIN   Result Value Ref Range    Troponin T 67 (H) 6 - 19 ng/L   COD (Adult) - Type and Crossmatch only order if transfusing RBC'S   Result Value Ref Range    ABO Grouping Only O     Rh Grouping Only POS     Antibody Screen-Cod NEG    ESTIMATED GFR   Result Value Ref Range    GFR If  26 (A) >60 mL/min/1.73 m 2    GFR If Non African American 22 (A) >60 mL/min/1.73 m 2   FERRITIN   Result Value Ref Range    Ferritin 1511.0 (H) 10.0 - 291.0 ng/mL   EKG   Result Value Ref Range    Report       [facility] Emergency Dept.    Test Date:  2020-11-29  Pt Name:    CHAITANYA CABELLO            Department: ER  MRN:        [id number]                      Room:  Gender:     Female                        Technician: [id number]  :        1950                   Requested By:ER TRIAGE PROTOCOL  Order #:    [id number]                    Reading MD: Carlos Arcos MD    Measurements  Intervals                                Axis  Rate:       82                           P:          55  VA:         176                          QRS:        19  QRSD:       94                           T:          114  QT:         368  QTc:        430    Interpretive Statements  EKG is normal sinus rhythm, normal axis, normal intervals, some slight T wave    flattening in the lateral leads, no ST elevation.  Electronically Signed On 2020 22:46:26 PST by Carlos Arcos MD           RADIOLOGY  DX-CHEST-PORTABLE (1 VIEW)   Final Result         1.  Pulmonary edema and/or infiltrates.   2.  Cardiomegaly          COURSE & MEDICAL DECISION MAKING  Pertinent Labs & Imaging studies reviewed. (See chart for details)    Patient here with worsening exertional tolerance, no considerable increase in her home O2 needs, with questionable fever as an outpatient.  Patient's exam is benign outside of some very mild wheezing.  She is on her normal oxygen at this point.  Will check basic labs, chest x-ray.  Will give patient albuterol inhaler as we are being discouraged from doing nebulizations in the setting of the Covid pandemic, and I will give methylprednisolone for patient's mild to moderate COPD exacerbation.  Patient's basic labs reveal anemia.  Patient denies any associated bleeding.  This is likely chronic anemia as patient has had symptomatic anemia in the past.  Is likely related to her CKD.  Will check iron panel  Patient will be consented for blood, I discussed the risks and benefits of blood transfusion  Patient will be admitted for blood transfusion, will also give Lasix as patient does appear to be mildly volume overloaded.  Covid test has been sent.  Will discuss case with hospitalist.  I also sent urinalysis to ensure the patient does  not have an infection here given her associated urinary symptoms.  She has no flank tenderness.      FINAL IMPRESSION  1.  CKD, volume overload, symptomatic anemia, shortness of breath, moderate COPD exacerbation      Electronically signed by: Isrrael Gonzalez M.D., 11/29/2020 9:20 PM

## 2020-12-01 LAB
ANION GAP SERPL CALC-SCNC: 10 MMOL/L (ref 7–16)
BASOPHILS # BLD AUTO: 0.2 % (ref 0–1.8)
BASOPHILS # BLD: 0.03 K/UL (ref 0–0.12)
BUN SERPL-MCNC: 54 MG/DL (ref 8–22)
CALCIUM SERPL-MCNC: 9.2 MG/DL (ref 8.5–10.5)
CHLORIDE SERPL-SCNC: 101 MMOL/L (ref 96–112)
CO2 SERPL-SCNC: 25 MMOL/L (ref 20–33)
CREAT SERPL-MCNC: 2.29 MG/DL (ref 0.5–1.4)
EOSINOPHIL # BLD AUTO: 0.02 K/UL (ref 0–0.51)
EOSINOPHIL NFR BLD: 0.1 % (ref 0–6.9)
ERYTHROCYTE [DISTWIDTH] IN BLOOD BY AUTOMATED COUNT: 54.4 FL (ref 35.9–50)
GLUCOSE BLD-MCNC: 125 MG/DL (ref 65–99)
GLUCOSE BLD-MCNC: 139 MG/DL (ref 65–99)
GLUCOSE BLD-MCNC: 180 MG/DL (ref 65–99)
GLUCOSE BLD-MCNC: 217 MG/DL (ref 65–99)
GLUCOSE SERPL-MCNC: 139 MG/DL (ref 65–99)
HCT VFR BLD AUTO: 22.4 % (ref 37–47)
HGB BLD-MCNC: 7.1 G/DL (ref 12–16)
IMM GRANULOCYTES # BLD AUTO: 0.22 K/UL (ref 0–0.11)
IMM GRANULOCYTES NFR BLD AUTO: 1.2 % (ref 0–0.9)
LYMPHOCYTES # BLD AUTO: 0.95 K/UL (ref 1–4.8)
LYMPHOCYTES NFR BLD: 5.2 % (ref 22–41)
MCH RBC QN AUTO: 30.6 PG (ref 27–33)
MCHC RBC AUTO-ENTMCNC: 31.7 G/DL (ref 33.6–35)
MCV RBC AUTO: 96.6 FL (ref 81.4–97.8)
MONOCYTES # BLD AUTO: 1.03 K/UL (ref 0–0.85)
MONOCYTES NFR BLD AUTO: 5.7 % (ref 0–13.4)
NEUTROPHILS # BLD AUTO: 15.97 K/UL (ref 2–7.15)
NEUTROPHILS NFR BLD: 87.6 % (ref 44–72)
NRBC # BLD AUTO: 0 K/UL
NRBC BLD-RTO: 0 /100 WBC
PLATELET # BLD AUTO: 224 K/UL (ref 164–446)
PMV BLD AUTO: 9 FL (ref 9–12.9)
POTASSIUM SERPL-SCNC: 5.4 MMOL/L (ref 3.6–5.5)
PROCALCITONIN SERPL-MCNC: 0.1 NG/ML
RBC # BLD AUTO: 2.32 M/UL (ref 4.2–5.4)
SODIUM SERPL-SCNC: 136 MMOL/L (ref 135–145)
WBC # BLD AUTO: 18.2 K/UL (ref 4.8–10.8)

## 2020-12-01 PROCEDURE — 700102 HCHG RX REV CODE 250 W/ 637 OVERRIDE(OP): Performed by: STUDENT IN AN ORGANIZED HEALTH CARE EDUCATION/TRAINING PROGRAM

## 2020-12-01 PROCEDURE — A9270 NON-COVERED ITEM OR SERVICE: HCPCS | Performed by: STUDENT IN AN ORGANIZED HEALTH CARE EDUCATION/TRAINING PROGRAM

## 2020-12-01 PROCEDURE — 700111 HCHG RX REV CODE 636 W/ 250 OVERRIDE (IP): Performed by: STUDENT IN AN ORGANIZED HEALTH CARE EDUCATION/TRAINING PROGRAM

## 2020-12-01 PROCEDURE — 700101 HCHG RX REV CODE 250: Performed by: STUDENT IN AN ORGANIZED HEALTH CARE EDUCATION/TRAINING PROGRAM

## 2020-12-01 PROCEDURE — A9270 NON-COVERED ITEM OR SERVICE: HCPCS | Performed by: HOSPITALIST

## 2020-12-01 PROCEDURE — 84145 PROCALCITONIN (PCT): CPT

## 2020-12-01 PROCEDURE — 700102 HCHG RX REV CODE 250 W/ 637 OVERRIDE(OP): Performed by: HOSPITALIST

## 2020-12-01 PROCEDURE — 96372 THER/PROPH/DIAG INJ SC/IM: CPT

## 2020-12-01 PROCEDURE — 770020 HCHG ROOM/CARE - TELE (206)

## 2020-12-01 PROCEDURE — 96376 TX/PRO/DX INJ SAME DRUG ADON: CPT

## 2020-12-01 PROCEDURE — 80048 BASIC METABOLIC PNL TOTAL CA: CPT

## 2020-12-01 PROCEDURE — 94640 AIRWAY INHALATION TREATMENT: CPT

## 2020-12-01 PROCEDURE — 99233 SBSQ HOSP IP/OBS HIGH 50: CPT | Mod: GC | Performed by: HOSPITALIST

## 2020-12-01 PROCEDURE — 85025 COMPLETE CBC W/AUTO DIFF WBC: CPT

## 2020-12-01 PROCEDURE — 94669 MECHANICAL CHEST WALL OSCILL: CPT

## 2020-12-01 PROCEDURE — 36415 COLL VENOUS BLD VENIPUNCTURE: CPT

## 2020-12-01 PROCEDURE — 82962 GLUCOSE BLOOD TEST: CPT | Mod: 91

## 2020-12-01 RX ORDER — NICOTINE 21 MG/24HR
21 PATCH, TRANSDERMAL 24 HOURS TRANSDERMAL
Status: DISCONTINUED | OUTPATIENT
Start: 2020-12-01 | End: 2020-12-03 | Stop reason: HOSPADM

## 2020-12-01 RX ORDER — PREDNISONE 20 MG/1
40 TABLET ORAL DAILY
Status: DISCONTINUED | OUTPATIENT
Start: 2020-12-01 | End: 2020-12-03 | Stop reason: HOSPADM

## 2020-12-01 RX ORDER — IPRATROPIUM BROMIDE AND ALBUTEROL SULFATE 2.5; .5 MG/3ML; MG/3ML
3 SOLUTION RESPIRATORY (INHALATION)
Status: DISCONTINUED | OUTPATIENT
Start: 2020-12-01 | End: 2020-12-02 | Stop reason: ALTCHOICE

## 2020-12-01 RX ORDER — FLUTICASONE PROPIONATE 44 UG/1
2 AEROSOL, METERED RESPIRATORY (INHALATION) DAILY
Status: DISCONTINUED | OUTPATIENT
Start: 2020-12-01 | End: 2020-12-03 | Stop reason: HOSPADM

## 2020-12-01 RX ORDER — AZITHROMYCIN 250 MG/1
500 TABLET, FILM COATED ORAL DAILY
Status: COMPLETED | OUTPATIENT
Start: 2020-12-01 | End: 2020-12-02

## 2020-12-01 RX ADMIN — NICOTINE TRANSDERMAL SYSTEM 21 MG: 21 PATCH, EXTENDED RELEASE TRANSDERMAL at 22:53

## 2020-12-01 RX ADMIN — ROSUVASTATIN CALCIUM 10 MG: 10 TABLET, FILM COATED ORAL at 16:08

## 2020-12-01 RX ADMIN — HYDROCODONE BITARTRATE AND ACETAMINOPHEN 2 TABLET: 5; 325 TABLET ORAL at 22:53

## 2020-12-01 RX ADMIN — IPRATROPIUM BROMIDE AND ALBUTEROL SULFATE 3 ML: .5; 3 SOLUTION RESPIRATORY (INHALATION) at 19:31

## 2020-12-01 RX ADMIN — IPRATROPIUM BROMIDE AND ALBUTEROL SULFATE 3 ML: .5; 3 SOLUTION RESPIRATORY (INHALATION) at 15:25

## 2020-12-01 RX ADMIN — LEVOTHYROXINE SODIUM 75 MCG: 0.07 TABLET ORAL at 05:33

## 2020-12-01 RX ADMIN — INSULIN LISPRO 6 UNITS: 100 INJECTION, SOLUTION INTRAVENOUS; SUBCUTANEOUS at 07:27

## 2020-12-01 RX ADMIN — TRAZODONE HYDROCHLORIDE 150 MG: 100 TABLET ORAL at 20:51

## 2020-12-01 RX ADMIN — HYDROCODONE BITARTRATE AND ACETAMINOPHEN 1 TABLET: 5; 325 TABLET ORAL at 16:08

## 2020-12-01 RX ADMIN — INSULIN LISPRO 6 UNITS: 100 INJECTION, SOLUTION INTRAVENOUS; SUBCUTANEOUS at 16:12

## 2020-12-01 RX ADMIN — AMLODIPINE BESYLATE 10 MG: 10 TABLET ORAL at 05:34

## 2020-12-01 RX ADMIN — FUROSEMIDE 40 MG: 10 INJECTION, SOLUTION INTRAMUSCULAR; INTRAVENOUS at 05:35

## 2020-12-01 RX ADMIN — FLUTICASONE PROPIONATE 88 MCG: 44 AEROSOL, METERED RESPIRATORY (INHALATION) at 12:45

## 2020-12-01 RX ADMIN — AMITRIPTYLINE HYDROCHLORIDE 50 MG: 50 TABLET, FILM COATED ORAL at 20:51

## 2020-12-01 RX ADMIN — PREDNISONE 40 MG: 20 TABLET ORAL at 12:45

## 2020-12-01 RX ADMIN — INSULIN GLARGINE 19 UNITS: 100 INJECTION, SOLUTION SUBCUTANEOUS at 16:10

## 2020-12-01 RX ADMIN — INSULIN LISPRO 6 UNITS: 100 INJECTION, SOLUTION INTRAVENOUS; SUBCUTANEOUS at 11:18

## 2020-12-01 RX ADMIN — IPRATROPIUM BROMIDE AND ALBUTEROL SULFATE 3 ML: 2.5; .5 SOLUTION RESPIRATORY (INHALATION) at 07:24

## 2020-12-01 RX ADMIN — AZITHROMYCIN MONOHYDRATE 500 MG: 250 TABLET ORAL at 08:49

## 2020-12-01 RX ADMIN — IPRATROPIUM BROMIDE AND ALBUTEROL SULFATE 3 ML: .5; 3 SOLUTION RESPIRATORY (INHALATION) at 10:38

## 2020-12-01 ASSESSMENT — ENCOUNTER SYMPTOMS
FEVER: 0
SPEECH CHANGE: 0
PALPITATIONS: 0
SHORTNESS OF BREATH: 1
FOCAL WEAKNESS: 0
ABDOMINAL PAIN: 0
VOMITING: 0
MYALGIAS: 0
CHILLS: 0
SENSORY CHANGE: 0
WEAKNESS: 0
COUGH: 1
SPUTUM PRODUCTION: 1
NAUSEA: 0
DIARRHEA: 0

## 2020-12-01 ASSESSMENT — FIBROSIS 4 INDEX: FIB4 SCORE: 1.04

## 2020-12-01 ASSESSMENT — PAIN DESCRIPTION - PAIN TYPE: TYPE: ACUTE PAIN

## 2020-12-01 NOTE — PROGRESS NOTES
Page sent out to on-call hospitalist. Dr. Dennison returned page. Updated Dr. Dennison that pt was asking about her night medications that she usually takes at home that were listed on her med rec but when this RN looked at her current MAR, they were not listed... Dr. Dennison ensured this RN that she would take a look at the med rec and add the medications necessary. No other orders at this time.

## 2020-12-01 NOTE — PROGRESS NOTES
Pt arrived to unit via hospital bed at 0610. Pt oriented to room, unit, and plan of care. Tele-monitor placed and monitor room notified. All questions answered at this time. Call light within reach; fall precautions in place.

## 2020-12-01 NOTE — PROGRESS NOTES
Assumed care of pt. Bedside report received from night RN Lydia. Pt was updated on plan of care. Call light, phone and personal belongings within reach. Bed alarm on and working appropriately.

## 2020-12-01 NOTE — PROGRESS NOTES
[facility] Medicine Daily Progress Note    Date of Service  11/30/2020    Chief Complaint  70 y.o. female admitted 11/29/2020 with dyspnea    Hospital Course  70 y.o. female who presented 11/29/2020 with shortness of breath.  Patient has reported shortness of breath upon exertion for the last 3 weeks and it has been progressively getting worse.  She also notes that she has had a productive cough during this time too, sometimes clear to a creamy color.  She also notes that during this time she has noted that her urine has a funny smell to it.  As her symptoms were not improved she decided to come to the ED for evaluation.     In ED, patient found to have elevated blood pressure and tachypnea. Remarkable labs include  Neutrophilic leukocytosis, Hgb 6.0, CKD.  Chest x-ray shows pulmonary edema and infiltrate and cardiomegaly.    Interval Problem Update  Breathing has improved from admission. No CP.    Consultants/Specialty  None    Code Status  Full Code    Disposition  Pending      Physical Exam  Temp:  [37.1 °C (98.8 °F)-37.2 °C (99 °F)] 37.1 °C (98.8 °F)  Pulse:  [61-88] 78  Resp:  [16-39] 16  BP: (127-193)/(56-79) 193/79  SpO2:  [92 %-100 %] 97 %    General: No acute distress  HEENT atraumatic, normocephalic, pupils equal round reactive to light  Neck: 6 cm of JVD above the angle of Anibal.  Chest: Bibasilar wheezes.  Cardiac: Physiologic S1 and S2  Abdomen: Soft, nontender, nondistended  Extremities: Without clubbing, cyanosis or edema  Neuro: Cranial nerves II through XII are grossly intact.  Psych: No anxiety, judgement intact.        Fluids    Intake/Output Summary (Last 24 hours) at 11/30/2020 1745  Last data filed at 11/30/2020 0623  Gross per 24 hour   Intake 307 ml   Output 900 ml   Net -593 ml       Laboratory  Recent Labs     11/29/20  2130 11/30/20  1350   WBC 12.2* 11.7*   RBC 1.99* 2.40*   HEMOGLOBIN 6.0* 7.4*   HEMATOCRIT 19.6* 23.9*   MCV 98.5* 95.4   MCH 30.2 30.8   MCHC 30.6* 32.3*   RDW 50.0 54.4*    PLATELETCT 188 188   MPV 9.1 9.1     Recent Labs     11/29/20  2130 11/30/20  1350   SODIUM 137 136   POTASSIUM 4.8 5.1   CHLORIDE 105 101   CO2 22 23   GLUCOSE 240* 435*   BUN 44* 50*   CREATININE 2.24* 2.27*   CALCIUM 8.7 9.1                   Imaging  DX-CHEST-PORTABLE (1 VIEW)   Final Result         1.  Pulmonary edema and/or infiltrates.   2.  Cardiomegaly           Assessment/Plan  * Acute exacerbation of chronic obstructive pulmonary disease (COPD) ([facility])  Assessment & Plan  Admit to medical floor unit  Start azithromycin 500 mg every 24 hours for likely underlying infection (productive cough and neutrophilic leukocytosis)  Noticed to have wheezing, will start DuoNebs every 4 hours  Oxygen as needed, keep O2 over 90%      DM type 2, uncontrolled, with renal complications ([facility])- (present on admission)  Assessment & Plan  Sliding scale prn    Acute exacerbation of CHF (congestive heart failure) ([facility])  Assessment & Plan  Noted to have EF of 80% as of July 2020  In slightly fluid overload state (edema and x-ray shortness of breath and pitting edema)  Will give lasix 40 mg IV daily, monitor creatinine     Symptomatic anemia  Assessment & Plan  Hemoglobin to be transfused 1 unit of packed red blood cell  Iron studies consistent with inflammatory disease  Recheck CBC in a.m., no suspicion of active bleed at this time can continue heparin subcu      Hypothyroidism- (present on admission)  Assessment & Plan  Synthroid 75 mcg daily    Tobacco abuse  Assessment & Plan  Patient states that she is actively trying to quit  At request of patient, will give nicotine patch    Morbid obesity ([facility])- (present on admission)  Assessment & Plan  Patient counseled on weight loss nutrition and healthy lifestyle         VTE prophylaxis: LMWH

## 2020-12-01 NOTE — PROGRESS NOTES
Pt glucose 435, no insulin on MAR. Pt complains of 6/10 throbbing back pain. Pt states back pain is chronic. Notified Dr. Vilchis. MD to put in orders. Will continue to monitor.

## 2020-12-02 LAB
ABO GROUP BLD: NORMAL
ANION GAP SERPL CALC-SCNC: 11 MMOL/L (ref 7–16)
ANION GAP SERPL CALC-SCNC: 12 MMOL/L (ref 7–16)
ANION GAP SERPL CALC-SCNC: 7 MMOL/L (ref 7–16)
BARCODED ABORH UBTYP: 5100
BARCODED PRD CODE UBPRD: NORMAL
BARCODED UNIT NUM UBUNT: NORMAL
BLD GP AB SCN SERPL QL: NORMAL
BUN SERPL-MCNC: 60 MG/DL (ref 8–22)
BUN SERPL-MCNC: 61 MG/DL (ref 8–22)
BUN SERPL-MCNC: 62 MG/DL (ref 8–22)
CALCIUM SERPL-MCNC: 8.7 MG/DL (ref 8.5–10.5)
CALCIUM SERPL-MCNC: 9.3 MG/DL (ref 8.5–10.5)
CALCIUM SERPL-MCNC: 9.8 MG/DL (ref 8.5–10.5)
CHLORIDE SERPL-SCNC: 100 MMOL/L (ref 96–112)
CHLORIDE SERPL-SCNC: 101 MMOL/L (ref 96–112)
CHLORIDE SERPL-SCNC: 98 MMOL/L (ref 96–112)
CO2 SERPL-SCNC: 21 MMOL/L (ref 20–33)
CO2 SERPL-SCNC: 22 MMOL/L (ref 20–33)
CO2 SERPL-SCNC: 26 MMOL/L (ref 20–33)
COMPONENT R 8504R: NORMAL
CREAT SERPL-MCNC: 2.13 MG/DL (ref 0.5–1.4)
CREAT SERPL-MCNC: 2.19 MG/DL (ref 0.5–1.4)
CREAT SERPL-MCNC: 2.39 MG/DL (ref 0.5–1.4)
EKG IMPRESSION: NORMAL
ERYTHROCYTE [DISTWIDTH] IN BLOOD BY AUTOMATED COUNT: 51.7 FL (ref 35.9–50)
ERYTHROCYTE [DISTWIDTH] IN BLOOD BY AUTOMATED COUNT: 52.5 FL (ref 35.9–50)
GLUCOSE BLD-MCNC: 111 MG/DL (ref 65–99)
GLUCOSE BLD-MCNC: 122 MG/DL (ref 65–99)
GLUCOSE BLD-MCNC: 248 MG/DL (ref 65–99)
GLUCOSE SERPL-MCNC: 153 MG/DL (ref 65–99)
GLUCOSE SERPL-MCNC: 204 MG/DL (ref 65–99)
GLUCOSE SERPL-MCNC: 273 MG/DL (ref 65–99)
HCT VFR BLD AUTO: 20.7 % (ref 37–47)
HCT VFR BLD AUTO: 26.1 % (ref 37–47)
HCT VFR BLD AUTO: 26.5 % (ref 37–47)
HGB BLD-MCNC: 6.6 G/DL (ref 12–16)
HGB BLD-MCNC: 8.3 G/DL (ref 12–16)
HGB BLD-MCNC: 8.3 G/DL (ref 12–16)
MAGNESIUM SERPL-MCNC: 2 MG/DL (ref 1.5–2.5)
MCH RBC QN AUTO: 30 PG (ref 27–33)
MCH RBC QN AUTO: 30.1 PG (ref 27–33)
MCHC RBC AUTO-ENTMCNC: 31.3 G/DL (ref 33.6–35)
MCHC RBC AUTO-ENTMCNC: 31.9 G/DL (ref 33.6–35)
MCV RBC AUTO: 94.1 FL (ref 81.4–97.8)
MCV RBC AUTO: 96 FL (ref 81.4–97.8)
PLATELET # BLD AUTO: 187 K/UL (ref 164–446)
PLATELET # BLD AUTO: 216 K/UL (ref 164–446)
PMV BLD AUTO: 8.9 FL (ref 9–12.9)
PMV BLD AUTO: 9.1 FL (ref 9–12.9)
POTASSIUM SERPL-SCNC: 5.4 MMOL/L (ref 3.6–5.5)
POTASSIUM SERPL-SCNC: 6.2 MMOL/L (ref 3.6–5.5)
POTASSIUM SERPL-SCNC: 6.5 MMOL/L (ref 3.6–5.5)
PRODUCT TYPE UPROD: NORMAL
RBC # BLD AUTO: 2.2 M/UL (ref 4.2–5.4)
RBC # BLD AUTO: 2.76 M/UL (ref 4.2–5.4)
RH BLD: NORMAL
SODIUM SERPL-SCNC: 132 MMOL/L (ref 135–145)
SODIUM SERPL-SCNC: 133 MMOL/L (ref 135–145)
SODIUM SERPL-SCNC: 133 MMOL/L (ref 135–145)
UNIT STATUS USTAT: NORMAL
WBC # BLD AUTO: 14.3 K/UL (ref 4.8–10.8)
WBC # BLD AUTO: 16.6 K/UL (ref 4.8–10.8)

## 2020-12-02 PROCEDURE — 700111 HCHG RX REV CODE 636 W/ 250 OVERRIDE (IP): Performed by: STUDENT IN AN ORGANIZED HEALTH CARE EDUCATION/TRAINING PROGRAM

## 2020-12-02 PROCEDURE — 700101 HCHG RX REV CODE 250: Performed by: STUDENT IN AN ORGANIZED HEALTH CARE EDUCATION/TRAINING PROGRAM

## 2020-12-02 PROCEDURE — 85014 HEMATOCRIT: CPT

## 2020-12-02 PROCEDURE — 700102 HCHG RX REV CODE 250 W/ 637 OVERRIDE(OP): Performed by: STUDENT IN AN ORGANIZED HEALTH CARE EDUCATION/TRAINING PROGRAM

## 2020-12-02 PROCEDURE — 85018 HEMOGLOBIN: CPT

## 2020-12-02 PROCEDURE — A9270 NON-COVERED ITEM OR SERVICE: HCPCS | Performed by: HOSPITALIST

## 2020-12-02 PROCEDURE — 93005 ELECTROCARDIOGRAM TRACING: CPT | Performed by: STUDENT IN AN ORGANIZED HEALTH CARE EDUCATION/TRAINING PROGRAM

## 2020-12-02 PROCEDURE — 94669 MECHANICAL CHEST WALL OSCILL: CPT

## 2020-12-02 PROCEDURE — 85027 COMPLETE CBC AUTOMATED: CPT | Mod: 91

## 2020-12-02 PROCEDURE — 86850 RBC ANTIBODY SCREEN: CPT

## 2020-12-02 PROCEDURE — 770020 HCHG ROOM/CARE - TELE (206)

## 2020-12-02 PROCEDURE — 700102 HCHG RX REV CODE 250 W/ 637 OVERRIDE(OP): Performed by: HOSPITALIST

## 2020-12-02 PROCEDURE — 82962 GLUCOSE BLOOD TEST: CPT | Mod: 91

## 2020-12-02 PROCEDURE — A9270 NON-COVERED ITEM OR SERVICE: HCPCS | Performed by: STUDENT IN AN ORGANIZED HEALTH CARE EDUCATION/TRAINING PROGRAM

## 2020-12-02 PROCEDURE — 86901 BLOOD TYPING SEROLOGIC RH(D): CPT

## 2020-12-02 PROCEDURE — 36430 TRANSFUSION BLD/BLD COMPNT: CPT

## 2020-12-02 PROCEDURE — 700105 HCHG RX REV CODE 258: Performed by: STUDENT IN AN ORGANIZED HEALTH CARE EDUCATION/TRAINING PROGRAM

## 2020-12-02 PROCEDURE — 36415 COLL VENOUS BLD VENIPUNCTURE: CPT

## 2020-12-02 PROCEDURE — 93010 ELECTROCARDIOGRAM REPORT: CPT | Performed by: INTERNAL MEDICINE

## 2020-12-02 PROCEDURE — 99232 SBSQ HOSP IP/OBS MODERATE 35: CPT | Mod: GC | Performed by: HOSPITALIST

## 2020-12-02 PROCEDURE — 94640 AIRWAY INHALATION TREATMENT: CPT

## 2020-12-02 PROCEDURE — P9016 RBC LEUKOCYTES REDUCED: HCPCS

## 2020-12-02 PROCEDURE — 86900 BLOOD TYPING SEROLOGIC ABO: CPT

## 2020-12-02 PROCEDURE — 83735 ASSAY OF MAGNESIUM: CPT

## 2020-12-02 PROCEDURE — 80048 BASIC METABOLIC PNL TOTAL CA: CPT | Mod: 91

## 2020-12-02 PROCEDURE — 86923 COMPATIBILITY TEST ELECTRIC: CPT

## 2020-12-02 RX ORDER — FUROSEMIDE 10 MG/ML
40 INJECTION INTRAMUSCULAR; INTRAVENOUS
Status: DISCONTINUED | OUTPATIENT
Start: 2020-12-02 | End: 2020-12-02

## 2020-12-02 RX ORDER — DEXTROSE MONOHYDRATE 25 G/50ML
50 INJECTION, SOLUTION INTRAVENOUS ONCE
Status: COMPLETED | OUTPATIENT
Start: 2020-12-02 | End: 2020-12-02

## 2020-12-02 RX ORDER — AZITHROMYCIN 250 MG/1
500 TABLET, FILM COATED ORAL ONCE
Status: DISCONTINUED | OUTPATIENT
Start: 2020-12-03 | End: 2020-12-02

## 2020-12-02 RX ORDER — DULOXETIN HYDROCHLORIDE 30 MG/1
30 CAPSULE, DELAYED RELEASE ORAL DAILY
Status: DISCONTINUED | OUTPATIENT
Start: 2020-12-02 | End: 2020-12-03 | Stop reason: HOSPADM

## 2020-12-02 RX ORDER — CALCIUM GLUCONATE 94 MG/ML
2 INJECTION, SOLUTION INTRAVENOUS ONCE
Status: DISCONTINUED | OUTPATIENT
Start: 2020-12-02 | End: 2020-12-02

## 2020-12-02 RX ADMIN — DULOXETINE HYDROCHLORIDE 30 MG: 30 CAPSULE, DELAYED RELEASE ORAL at 12:15

## 2020-12-02 RX ADMIN — AMITRIPTYLINE HYDROCHLORIDE 50 MG: 50 TABLET, FILM COATED ORAL at 20:55

## 2020-12-02 RX ADMIN — FUROSEMIDE 40 MG: 10 INJECTION, SOLUTION INTRAMUSCULAR; INTRAVENOUS at 05:49

## 2020-12-02 RX ADMIN — HYDROCODONE BITARTRATE AND ACETAMINOPHEN 2 TABLET: 5; 325 TABLET ORAL at 05:49

## 2020-12-02 RX ADMIN — IPRATROPIUM BROMIDE AND ALBUTEROL SULFATE 3 ML: .5; 3 SOLUTION RESPIRATORY (INHALATION) at 06:18

## 2020-12-02 RX ADMIN — FLUTICASONE PROPIONATE 88 MCG: 44 AEROSOL, METERED RESPIRATORY (INHALATION) at 06:06

## 2020-12-02 RX ADMIN — TRAZODONE HYDROCHLORIDE 150 MG: 100 TABLET ORAL at 20:51

## 2020-12-02 RX ADMIN — HYDROCODONE BITARTRATE AND ACETAMINOPHEN 2 TABLET: 5; 325 TABLET ORAL at 18:44

## 2020-12-02 RX ADMIN — INSULIN LISPRO 6 UNITS: 100 INJECTION, SOLUTION INTRAVENOUS; SUBCUTANEOUS at 06:04

## 2020-12-02 RX ADMIN — AMLODIPINE BESYLATE 10 MG: 10 TABLET ORAL at 05:49

## 2020-12-02 RX ADMIN — INSULIN HUMAN 10 UNITS: 100 INJECTION, SOLUTION PARENTERAL at 09:35

## 2020-12-02 RX ADMIN — AZITHROMYCIN MONOHYDRATE 500 MG: 250 TABLET ORAL at 05:49

## 2020-12-02 RX ADMIN — ROSUVASTATIN CALCIUM 10 MG: 10 TABLET, FILM COATED ORAL at 16:59

## 2020-12-02 RX ADMIN — DEXTROSE MONOHYDRATE 50 ML: 25 INJECTION, SOLUTION INTRAVENOUS at 09:37

## 2020-12-02 RX ADMIN — LEVOTHYROXINE SODIUM 75 MCG: 0.07 TABLET ORAL at 05:49

## 2020-12-02 RX ADMIN — CALCIUM GLUCONATE 2 G: 98 INJECTION, SOLUTION INTRAVENOUS at 09:38

## 2020-12-02 RX ADMIN — PREDNISONE 40 MG: 20 TABLET ORAL at 05:49

## 2020-12-02 RX ADMIN — INSULIN GLARGINE 19 UNITS: 100 INJECTION, SOLUTION SUBCUTANEOUS at 17:00

## 2020-12-02 ASSESSMENT — ENCOUNTER SYMPTOMS
SPUTUM PRODUCTION: 1
WEAKNESS: 0
FOCAL WEAKNESS: 0
ABDOMINAL PAIN: 0
VOMITING: 0
PALPITATIONS: 0
DIARRHEA: 0
SENSORY CHANGE: 0
NAUSEA: 0
MYALGIAS: 0
SHORTNESS OF BREATH: 1
CHILLS: 0
SPEECH CHANGE: 0
COUGH: 1
FEVER: 0

## 2020-12-02 ASSESSMENT — PAIN DESCRIPTION - PAIN TYPE
TYPE: ACUTE PAIN
TYPE: CHRONIC PAIN
TYPE: CHRONIC PAIN

## 2020-12-02 NOTE — HEART FAILURE PROGRAM
Patient admitted with SOB felt to be related to COPD exacerbation and HFpEF exacerbation.    Tobacco Cessation: providers have not documented cessation counseling, only noting that patient is trying to quit  Diabetes Dx: is on insulin and rosuvastatin  AC for atrial arrhythmia: n/a  SMITA - I: n/a  Evidence based beta blocker (bisoprolol, carvedilol, or Toprol XL): n/a  Aldosterone antagonist: n/a  Hydralazine dinitrate: n/a  Influenza vaccine: 9/8/20  Pneumococcal vaccine: 2016  Device Screening: n/a    Please see below for measures that must be addressed prior to discharge.     Daily Nurse: please begin to fill out the HF checklist (pink sheet in hard chart) and use it to guide your daily care.    Discharge Nurse: please ensure completeness of the HF checklist (pink sheet in hard chart) and have it co-signed by the charge RN before the patient leaves the hospital.    Thank you, Porsche Cardio RN Navigator [phone]    HF Measures:  1. Documentation of LV systolic function (echo or cath) PTA, during this hospitalization, or plan to assess post discharge or reason for not assessing documented  2. Documentation of fluid intake and urine output every nursing shift  3. 2 hour post diuretic assessment documented 2 hours after diuretic given  4. HF Patient Education using the Living Well With Heart Failure Booklet and Symptom Tracker documented every nursing shift  5. Nutrition consult for diet education  6. Daily weights (one weight documented every 24 hours) on a standing scale unless standing is contraindicated in which case bed scale can be used - have patient write weight on symptom tracker  7. For LVEF less than or equal to 40%, ACE-I, ARNI or ARB prescribed at discharge   8. For LVEF less than or equal to 40%, an Evidence Based Beta Blocker (bisoprolol, carvedilol, toprol xl) must be prescribed at discharge  9. For LVEF less than or equal to 35% aldosterone blockade prescribed at discharge  10. The combination of  hydralazine and isosorbide dinitrate is recommended to reduce morbidity and mortality for patients self-described  Americans with NYHA class III-IV HFrEF (EF 40% or less), receiving optimal therapy with ACE inhibitors and beta blockers, unless contraindicated (Class I, DEWEY: A).  11. If a HF patient is diabetic or is newly diagnosed with DM: prescribed diabetes treatment at discharge in the form of glycemic control (diet or anti-hyperglycemic medication) or f/u appointment for diabetes management scheduled at discharge.  12. If a HF patient has diabetes: prescribed lipid lowering medication at discharge  13. Documented smoking cessation advice or counseling  14. If a HF patient has a-fib: anticoagulation is prescribed upon discharge or contraindication is documented  15. Screening for and administering immunizations as long as no contraindications: Pneumonia (regardless of age) and Influenza  16. Written discharge instructions include:  ? Daily weights  ? Record weight on tracker  ? Bring tracker to appointments  ? Call MD for weight gain of 3lb /day or 5lb/week  ? HF medication teaching  ? Low sodium diet  ? Follow up appointment within seven calendar days of d/c must include: date, time and location  ? Activity  ? Worsening symptoms    What if any of the above HF measures are contraindicated?  ? Request that the discharging provider document the medication/intervention and the contraindication specifically in a progress note  ? For example: “no CHF meds due to hypotension” is not enough. It needs to say: “No ACE-I, ARNI, ARB due to hypotension”; “No Beta Blockade due to bradycardia”…

## 2020-12-02 NOTE — PROGRESS NOTES
Daily Progress Note:     Date of Service: 12/2/2020  Primary Team: [facility]   Attending: Dr. Trammell  Senior Resident: Dr. Clarke  Intern: Dr. Fernandez  Contact:  [phone]    Chief Complaint: SOB    70 y.o. female with PMH of severe COPD on 4-5 L home O2, CKD stage IV, DM2 uncontrolled with renal complications, hypothyroidism, HTN, and OA who presented with shortness of breath. Patient has reported SOB on minimal exertion for over 3 weeks, and it has been progressively getting worse. She also notes stable, not worsening, productive cough with clear to a creamy colored sputum. Prior to SOB onset, she was having smelly urine treated with antibiotics.     Subjective:   Overnight, pt's HGB fell to 6.6, so night team transfused pt 1 unit of PRBC     This morning, potassium increased from 5.4 to 6.2    Pt reports improvement of SOB back to her baseline. She has been eating well. Pt encouraged to sit up in bed, get up to chair, and try to walk with help of nurse. Pt denies any chest pain or discomfort.     She reports that she would like to get discharged today.     Consultants/Specialty:  PT/OT    Review of Systems:   Review of Systems   Constitutional: Negative for chills and fever.   Respiratory: Positive for cough (not worse than baseline), sputum production (clear to creamy) and shortness of breath.    Cardiovascular: Negative for chest pain and palpitations.   Gastrointestinal: Negative for abdominal pain, diarrhea, nausea and vomiting.   Genitourinary: Negative for dysuria, frequency and urgency.   Musculoskeletal: Negative for myalgias.   Neurological: Negative for sensory change, speech change, focal weakness and weakness.   All other systems reviewed and are negative.    Objective Data:   Physical Exam:   Vitals:   Temp:  [36 °C (96.8 °F)-36.7 °C (98 °F)] 36.6 °C (97.8 °F)  Pulse:  [65-82] 67  Resp:  [16-18] 16  BP: ()/(53-80) 151/73  SpO2:  [92 %-98 %] 98 %    Physical Exam  Constitutional:        General: She is not in acute distress.     Appearance: Normal appearance. She is obese. She is not ill-appearing, toxic-appearing or diaphoretic.   HENT:      Head: Normocephalic and atraumatic.      Nose: No congestion.      Mouth/Throat:      Mouth: Mucous membranes are moist.      Pharynx: Oropharynx is clear. No oropharyngeal exudate or posterior oropharyngeal erythema.   Eyes:      General: No scleral icterus.        Right eye: No discharge.         Left eye: No discharge.      Extraocular Movements: Extraocular movements intact.      Conjunctiva/sclera: Conjunctivae normal.      Pupils: Pupils are equal, round, and reactive to light.   Neck:      Musculoskeletal: Normal range of motion and neck supple.   Cardiovascular:      Rate and Rhythm: Normal rate and regular rhythm.      Heart sounds: No murmur.   Pulmonary:      Breath sounds: Stridor present. Wheezing present.      Comments: Loud insp and exp sounds  Abdominal:      General: Abdomen is flat. There is no distension.      Palpations: Abdomen is soft. There is no mass.      Tenderness: There is no abdominal tenderness. There is no guarding.   Musculoskeletal: Normal range of motion.         General: No swelling or deformity.      Right lower leg: No edema.      Left lower leg: No edema.   Skin:     General: Skin is warm and dry.      Capillary Refill: Capillary refill takes less than 2 seconds.      Coloration: Skin is not jaundiced.      Comments: Venous stasis changes on lower ext   Neurological:      General: No focal deficit present.      Mental Status: She is alert and oriented to person, place, and time. Mental status is at baseline.      Cranial Nerves: No cranial nerve deficit.      Motor: No weakness.   Psychiatric:         Mood and Affect: Mood normal.         Behavior: Behavior normal.         Thought Content: Thought content normal.         Judgment: Judgment normal.     Labs:   WBC 18.2  HGB 7.1    Na 136  K 5.4  Cr 2.29 (close  to baseline)  GFR 21  UA negative     Imaging:   EKG 11/29  Normal sinus rhythm, normal axis, normal intervals, some slight T wave    EKG 12/2  SINUS RHYTHM   NONSPECIFIC T ABNORMALITIES, LATERAL LEADS   ARTIFACT IN LEAD(S) II,III,aVR,aVL,aVF,V1,V2,V3,V4,V5,V6   Compared to ECG 11/29/2020 20:52:25   T-wave abnormality now present   ST (T wave) deviation no longer present     Problem Representation:   70 y.o. female with PMH of severe COPD on 4-5 L home O2, CKD stage IV, DM2 uncontrolled with renal complications, hypothyroidism, HTN, and OA who presented with shortness of breath. Being treated for COPD exacerbation and CHF exacerbation with breathing tx, azithromycin, and furosemide. Will monitor closely for clinical improvement, weights, and output.    Shortness of breath  Acute exacerbation of chronic obstructive pulmonary disease   Acute exacerbation of CHF  CXR shows pulmonary edema and/or infiltrates and cardiomegaly  Azithromycin 500 mg every 24 hours for likely underlying infection (productive cough and neutrophilic leukocytosis)  Noticed to have significant wheezing, will start DuoNebs every 4 hours  Oxygen as needed, keep O2 over 90%  Started on prednisone 40 daily on 12/1  Noted to have EF of 80% as of July 2020  In slightly fluid overload state, as pt presents with edema, SOB, x-ray findings   Will monitor I&O and daily weights closely    Hyperkalmia  K 5.4 -> 6.2 on 12/2  When we noticed the labs, informed nurse about giving pt calcium gluconate, insulin, dextrose 50 STAT  Ordered STAT EKG, which showed T-wave abnormality   Repeat BMP at 1100 showed K of 5.4.   Repeat BMP at 1700 pending    DM type 2, uncontrolled, with renal complications  Lantus 19 q evening  SSI  A1c is 6.2 in June    Symptomatic anemia  HGB low on admission. Transfused 1 unit of PRBC  Iron studies consistent with inflammatory disease  No concern for active bleeding currently    Hypothyroidism  Synthroid 75 mcg daily  TSH is 2.490 in  [place]    Tobacco abuse  Patient states that she is actively trying to quit  Nicotine patch   Recommend tobacco cessation counseling     Morbid obesity  Patient counseled on weight loss nutrition and healthy lifestyle

## 2020-12-02 NOTE — DISCHARGE PLANNING
Anticipated Discharge Disposition: TBD    Action: Pt was discussed IDT rounds. Pt's Lasix was discontinued and will need to be monitored for one more day. Pt was on service with [facility] so a face to face and home health order will be needed to resume that service.

## 2020-12-02 NOTE — FACE TO FACE
Face to Face Supporting Documentation - Home Health    The encounter with this patient was in whole or in part the primary reason for home health admission.    Date of encounter:   Patient:                    MRN:                       YOB: 2021  Priya Callahan  [id number]  1950     Home health to see patient for:  Skilled Nursing care for assessment, interventions & education, Physical Therapy evaluation and treatment and Occupational therapy evaluation and treatment    Skilled need for:  Comment: deconditioning    Skilled nursing interventions to include:  Comment: None    Homebound status evidenced by:  Need the aid of supportive devices such as crutches, canes, wheelchairs or walkers. Leaving home requires a considerable and taxing effort. There is a normal inability to leave the home.    Community Physician to provide follow up care: Crystal Ramos M.D.     Optional Interventions? No    I certify the face to face encounter for this home health care referral meets the CMS requirements and the encounter/clinical assessment with the patient was, in whole, or in part, for the medical condition(s) listed above, which is the primary reason for home health care. Based on my clinical findings: the service(s) are medically necessary, support the need for home health care, and the homebound criteria are met.  I certify that this patient has had a face to face encounter by myself.  Angeles Fernandez M.D. - NPI: [id number]

## 2020-12-02 NOTE — PROGRESS NOTES
Assumed care of patient. Bedside report, received from Bella ISAACS. Updated POC, call light within reach, and fall precautions in place. Bed locked and and in lowest position. Patient instructed to call for assistance before getting out of bed. All questions answered, no further needs at this time.

## 2020-12-02 NOTE — PROGRESS NOTES
Daily Progress Note:     Date of Service: 12/1/2020  Primary Team: [facility]   Attending: Dr. Trammell  Senior Resident: Dr. Clarke  Intern: Dr. Fernandez  Contact:  [phone]    Chief Complaint: SOB    70 y.o. female with PMH of severe COPD on 4-5 L home O2, CKD stage IV, DM2 uncontrolled with renal complications, hypothyroidism, HTN, and OA who presented with shortness of breath. Patient has reported SOB on minimal exertion for over 3 weeks, and it has been progressively getting worse. She also notes stable, not worsening, productive cough with clear to a creamy colored sputum. Prior to SOB onset, she was having smelly urine treated with antibiotics.     Subjective:   She denies any dysuria associated with smelly urine. Pt reports urinary symptom improvement with furosemide and increased fluid intake. Pt reports that her SOB has improved minimally since admission. She does not feel close to her baseline. She denies any other symptoms.     In the room, prior to breathing tx saturating 92% on 6L. Following breathing tx 98% on 6L.    Consultants/Specialty:  None     Review of Systems:   Review of Systems   Constitutional: Negative for chills and fever.   Respiratory: Positive for cough (not worse than baseline), sputum production (clear to creamy) and shortness of breath.    Cardiovascular: Negative for chest pain and palpitations.   Gastrointestinal: Negative for abdominal pain, diarrhea, nausea and vomiting.   Genitourinary: Negative for dysuria, frequency and urgency.   Musculoskeletal: Negative for myalgias.   Neurological: Negative for sensory change, speech change, focal weakness and weakness.   All other systems reviewed and are negative.    Objective Data:   Physical Exam:   Vitals:   Temp:  [36 °C (96.8 °F)-36.7 °C (98 °F)] 36.7 °C (98 °F)  Pulse:  [65-82] 66  Resp:  [16-20] 18  BP: ()/(53-69) 112/53  SpO2:  [88 %-100 %] 96 %    Physical Exam  Constitutional:       General: She is not in  acute distress.     Appearance: Normal appearance. She is obese. She is not ill-appearing, toxic-appearing or diaphoretic.   HENT:      Head: Normocephalic and atraumatic.      Nose: No congestion.      Mouth/Throat:      Mouth: Mucous membranes are moist.      Pharynx: Oropharynx is clear. No oropharyngeal exudate or posterior oropharyngeal erythema.   Eyes:      General: No scleral icterus.        Right eye: No discharge.         Left eye: No discharge.      Extraocular Movements: Extraocular movements intact.      Conjunctiva/sclera: Conjunctivae normal.      Pupils: Pupils are equal, round, and reactive to light.   Neck:      Musculoskeletal: Normal range of motion and neck supple.   Cardiovascular:      Rate and Rhythm: Normal rate and regular rhythm.      Heart sounds: No murmur.   Pulmonary:      Breath sounds: Stridor present. Wheezing present.      Comments: Loud insp and exp sounds  Abdominal:      General: Abdomen is flat. There is no distension.      Palpations: Abdomen is soft. There is no mass.      Tenderness: There is no abdominal tenderness. There is no guarding.   Musculoskeletal: Normal range of motion.         General: No swelling or deformity.      Right lower leg: No edema.      Left lower leg: No edema.   Skin:     General: Skin is warm and dry.      Capillary Refill: Capillary refill takes less than 2 seconds.      Coloration: Skin is not jaundiced.      Comments: Venous stasis changes on lower ext   Neurological:      General: No focal deficit present.      Mental Status: She is alert and oriented to person, place, and time. Mental status is at baseline.      Cranial Nerves: No cranial nerve deficit.      Motor: No weakness.   Psychiatric:         Mood and Affect: Mood normal.         Behavior: Behavior normal.         Thought Content: Thought content normal.         Judgment: Judgment normal.     Labs:   WBC 18.2  HGB 7.1    Na 136  K 5.4  Cr 2.29 (close to baseline)  GFR 21  UA  negative     Imaging:   CXR  FINDINGS:  Interval removal of right internal jugular central line is seen.   Overlying cardiac leads are present. Cardiomegaly is observed.  The mediastinal contour appears within normal limits.  The central  pulmonary vasculature appears prominent and indistinct.  The lungs appear well expanded bilaterally.  Diffuse scattered hazy pulmonary parenchymal opacities are seen.  No significant pleural effusions are identified.  The bony structures appear age-appropriate.  IMPRESSION:  1.  Pulmonary edema and/or infiltrates.  2.  Cardiomegaly    Problem Representation:   70 y.o. female with PMH of severe COPD on 4-5 L home O2, CKD stage IV, DM2 uncontrolled with renal complications, hypothyroidism, HTN, and OA who presented with shortness of breath. Being treated for COPD exacerbation and CHF exacerbation with breathing tx, azithromycin, and furosemide. Will monitor closely for clinical improvement, weights, and output    Shortness of breath  Acute exacerbation of chronic obstructive pulmonary disease   Acute exacerbation of CHF  CXR shows pulmonary edema and/or infiltrates and cardiomegaly  Azithromycin 500 mg every 24 hours for likely underlying infection (productive cough and neutrophilic leukocytosis)  Noticed to have significant wheezing, will start DuoNebs every 4 hours  Oxygen as needed, keep O2 over 90%  Started on prednisone 40 daily on 12/1  Noted to have EF of 80% as of July 2020  In slightly fluid overload state, as pt presents with edema, SOB, x-ray findings   Continue furosemide 40 mg IV daily  Will monitor I&O and daily weights closely    DM type 2, uncontrolled, with renal complications  Lantus 19 q evening  SSI  A1c is 6.2 in June    Symptomatic anemia  HGB low on admission. Transfused 1 unit of PRBC  Iron studies consistent with inflammatory disease  No concern for active bleeding currently    Hypothyroidism  Synthroid 75 mcg daily  TSH is 2.490 in June    Tobacco  abuse  Patient states that she is actively trying to quit  Nicotine patch     Morbid obesity  Patient counseled on weight loss nutrition and healthy lifestyle

## 2020-12-03 ENCOUNTER — PATIENT OUTREACH (OUTPATIENT)
Dept: HEALTH INFORMATION MANAGEMENT | Facility: OTHER | Age: 70
End: 2020-12-03

## 2020-12-03 VITALS
WEIGHT: 217.37 LBS | OXYGEN SATURATION: 98 % | BODY MASS INDEX: 36.22 KG/M2 | RESPIRATION RATE: 18 BRPM | SYSTOLIC BLOOD PRESSURE: 135 MMHG | HEART RATE: 76 BPM | TEMPERATURE: 97.5 F | DIASTOLIC BLOOD PRESSURE: 71 MMHG | HEIGHT: 65 IN

## 2020-12-03 PROBLEM — N18.4 CHRONIC KIDNEY DISEASE (CKD), STAGE IV (SEVERE) (HCC): Status: ACTIVE | Noted: 2019-07-20

## 2020-12-03 LAB
ANION GAP SERPL CALC-SCNC: 5 MMOL/L (ref 7–16)
BUN SERPL-MCNC: 64 MG/DL (ref 8–22)
CALCIUM SERPL-MCNC: 9.4 MG/DL (ref 8.5–10.5)
CHLORIDE SERPL-SCNC: 104 MMOL/L (ref 96–112)
CO2 SERPL-SCNC: 28 MMOL/L (ref 20–33)
CREAT SERPL-MCNC: 2.23 MG/DL (ref 0.5–1.4)
ERYTHROCYTE [DISTWIDTH] IN BLOOD BY AUTOMATED COUNT: 51.6 FL (ref 35.9–50)
GLUCOSE BLD-MCNC: 232 MG/DL (ref 65–99)
GLUCOSE BLD-MCNC: 75 MG/DL (ref 65–99)
GLUCOSE BLD-MCNC: 99 MG/DL (ref 65–99)
GLUCOSE SERPL-MCNC: 76 MG/DL (ref 65–99)
HCT VFR BLD AUTO: 27.2 % (ref 37–47)
HGB BLD-MCNC: 8.5 G/DL (ref 12–16)
MAGNESIUM SERPL-MCNC: 2.2 MG/DL (ref 1.5–2.5)
MCH RBC QN AUTO: 29.5 PG (ref 27–33)
MCHC RBC AUTO-ENTMCNC: 31.3 G/DL (ref 33.6–35)
MCV RBC AUTO: 94.4 FL (ref 81.4–97.8)
PHOSPHATE SERPL-MCNC: 4.7 MG/DL (ref 2.5–4.5)
PLATELET # BLD AUTO: 233 K/UL (ref 164–446)
PMV BLD AUTO: 8.9 FL (ref 9–12.9)
POTASSIUM SERPL-SCNC: 5.3 MMOL/L (ref 3.6–5.5)
RBC # BLD AUTO: 2.88 M/UL (ref 4.2–5.4)
SODIUM SERPL-SCNC: 137 MMOL/L (ref 135–145)
WBC # BLD AUTO: 16.3 K/UL (ref 4.8–10.8)

## 2020-12-03 PROCEDURE — 36415 COLL VENOUS BLD VENIPUNCTURE: CPT

## 2020-12-03 PROCEDURE — 99238 HOSP IP/OBS DSCHRG MGMT 30/<: CPT | Mod: GC | Performed by: HOSPITALIST

## 2020-12-03 PROCEDURE — 700102 HCHG RX REV CODE 250 W/ 637 OVERRIDE(OP): Performed by: STUDENT IN AN ORGANIZED HEALTH CARE EDUCATION/TRAINING PROGRAM

## 2020-12-03 PROCEDURE — 83735 ASSAY OF MAGNESIUM: CPT

## 2020-12-03 PROCEDURE — 97161 PT EVAL LOW COMPLEX 20 MIN: CPT

## 2020-12-03 PROCEDURE — 700111 HCHG RX REV CODE 636 W/ 250 OVERRIDE (IP): Performed by: STUDENT IN AN ORGANIZED HEALTH CARE EDUCATION/TRAINING PROGRAM

## 2020-12-03 PROCEDURE — A9270 NON-COVERED ITEM OR SERVICE: HCPCS | Performed by: STUDENT IN AN ORGANIZED HEALTH CARE EDUCATION/TRAINING PROGRAM

## 2020-12-03 PROCEDURE — 97165 OT EVAL LOW COMPLEX 30 MIN: CPT

## 2020-12-03 PROCEDURE — 85027 COMPLETE CBC AUTOMATED: CPT

## 2020-12-03 PROCEDURE — 82962 GLUCOSE BLOOD TEST: CPT

## 2020-12-03 PROCEDURE — 80048 BASIC METABOLIC PNL TOTAL CA: CPT

## 2020-12-03 PROCEDURE — 84100 ASSAY OF PHOSPHORUS: CPT

## 2020-12-03 RX ORDER — PREDNISONE 20 MG/1
20 TABLET ORAL DAILY
Qty: 2 TAB | Refills: 0 | Status: SHIPPED | OUTPATIENT
Start: 2020-12-03 | End: 2020-12-05

## 2020-12-03 RX ADMIN — LEVOTHYROXINE SODIUM 75 MCG: 0.07 TABLET ORAL at 05:01

## 2020-12-03 RX ADMIN — UMECLIDINIUM BROMIDE AND VILANTEROL TRIFENATATE 1 PUFF: 62.5; 25 POWDER RESPIRATORY (INHALATION) at 09:52

## 2020-12-03 RX ADMIN — DULOXETINE HYDROCHLORIDE 30 MG: 30 CAPSULE, DELAYED RELEASE ORAL at 05:01

## 2020-12-03 RX ADMIN — AMLODIPINE BESYLATE 10 MG: 10 TABLET ORAL at 05:01

## 2020-12-03 RX ADMIN — PREDNISONE 40 MG: 20 TABLET ORAL at 05:01

## 2020-12-03 RX ADMIN — FLUTICASONE PROPIONATE 88 MCG: 44 AEROSOL, METERED RESPIRATORY (INHALATION) at 05:02

## 2020-12-03 RX ADMIN — NICOTINE TRANSDERMAL SYSTEM 21 MG: 21 PATCH, EXTENDED RELEASE TRANSDERMAL at 05:02

## 2020-12-03 ASSESSMENT — COGNITIVE AND FUNCTIONAL STATUS - GENERAL
SUGGESTED CMS G CODE MODIFIER DAILY ACTIVITY: CJ
MOBILITY SCORE: 21
DAILY ACTIVITIY SCORE: 21
HELP NEEDED FOR BATHING: A LITTLE
TOILETING: A LITTLE
SUGGESTED CMS G CODE MODIFIER MOBILITY: CJ
DRESSING REGULAR LOWER BODY CLOTHING: A LITTLE
CLIMB 3 TO 5 STEPS WITH RAILING: A LITTLE
TURNING FROM BACK TO SIDE WHILE IN FLAT BAD: A LITTLE
MOVING TO AND FROM BED TO CHAIR: A LITTLE

## 2020-12-03 ASSESSMENT — ACTIVITIES OF DAILY LIVING (ADL): TOILETING: INDEPENDENT

## 2020-12-03 ASSESSMENT — GAIT ASSESSMENTS
ASSISTIVE DEVICE: FRONT WHEEL WALKER
DISTANCE (FEET): 50
GAIT LEVEL OF ASSIST: SUPERVISED
DEVIATION: BRADYKINETIC

## 2020-12-03 ASSESSMENT — PAIN DESCRIPTION - PAIN TYPE: TYPE: ACUTE PAIN

## 2020-12-03 NOTE — DISCHARGE PLANNING
Anticipated Discharge Disposition: Home with Home Health     Action: LSW faxed choice form for [facility] to resume care.    Barriers to Discharge: HH re-acceptance    Plan: LSW to follow up and assist as needed.

## 2020-12-03 NOTE — DISCHARGE INSTRUCTIONS
Discharge Instructions    Discharged to home by car with escort. Discharged via wheelchair, hospital escort: Yes.  Special equipment needed: Walker    Be sure to schedule a follow-up appointment with your primary care doctor or any specialists as instructed.     Discharge Plan:   Diet Plan: Discussed  Activity Level: Discussed  Confirmed Follow up Appointment: Patient to Call and Schedule Appointment  Confirmed Symptoms Management: Discussed  Medication Reconciliation Updated: Yes  Influenza Vaccine Indication: Not indicated: Previously immunized this influenza season and > 8 years of age    I understand that a diet low in cholesterol, fat, and sodium is recommended for good health. Unless I have been given specific instructions below for another diet, I accept this instruction as my diet prescription.   Other diet:     Special Instructions: Chronic Obstructive Pulmonary Disease (COPD) is a long-term, progressive lung disease that makes it harder to breathe. It includes chronic bronchitis, emphysema, and refractory (non-reversible) asthma. With COPD, the airways in your lungs become inflamed and thicken, and the tissue where oxygen is exchanged is destroyed. The flow of air in and out of your lungs decreases. When that happens, less oxygen gets into your body tissues, and it becomes harder to get rid of the waste gas carbon dioxide. As the disease gets worse, shortness of breath makes it harder to remain active. There is no cure for COPD, but it is preventable and treatable.    COPD Patient Discharge Instructions    • Diet  o Follow a low fat, low cholesterol, low salt diet unless instructed otherwise by your Doctor. Read the labels on the back of food products and track your intake of fat, cholesterol and salt.  • No smoking  o Discontinuing smoking will have the biggest impact on preventing progression of disease.  o To participate in [facility]’s Quit Tobacco Program, call 878-3351 or visit  [email or web address]  • Oxygen  o If your doctor has order that you wear oxygen at home, it is important to wear it as ordered.  o Do not smoke, vape, or use e-cigarettes with oxygen on.  o Store in an appropriate location: upright in its castro or laid flat down, away from open flames and stoves.   o Do not use oil-based creams and moisturizers (ie: petroleum products, oil-based lip moisturizers) or aerosol sprays (ie: hair sprays or deodorants) when using your oxygen equipment.  o Be careful with tubing placement to prevent yourself and others from tripping.  • Medications  o Refer to your personalized Action Plan to manage your symptoms.  • Warning signs of an exacerbation  o Breathing fast and shallow, worsening shortness of breath (like you just finished exercising)  o Chest tightness  o Increases in sputum production  o Changes in sputum color  o Lower oxygen levels than baseline  • When to call your doctor  o If the warning signs of an exacerbation do not improve  o Refer to your personalized Action Plan   • Pulmonary Rehab  o Your doctor has ordered you a referral to Pulmonary Rehab. Call [phone] to schedule an appointment  • Attend your follow-up appointment with your PCP and/or Pulmonologist  See the educational handout provided by your COPD Navigator for more information. This also explains more about COPD, symptoms of an exacerbation, and some of the tests that you have undergone.    · Is patient discharged on Warfarin / Coumadin?   No     Depression / Suicide Risk    As you are discharged from this [facility], it is important to learn how to keep safe from harming yourself.    Recognize the warning signs:  · Abrupt changes in personality, positive or negative- including increase in energy   · Giving away possessions  · Change in eating patterns- significant weight changes-  positive or negative  · Change in sleeping patterns- unable to sleep or sleeping all the time   · Unwillingness or  inability to communicate  · Depression  · Unusual sadness, discouragement and loneliness  · Talk of wanting to die  · Neglect of personal appearance   · Rebelliousness- reckless behavior  · Withdrawal from people/activities they love  · Confusion- inability to concentrate     If you or a loved one observes any of these behaviors or has concerns about self-harm, here's what you can do:  · Talk about it- your feelings and reasons for harming yourself  · Remove any means that you might use to hurt yourself (examples: pills, rope, extension cords, firearm)  · Get professional help from the community (Mental Health, Substance Abuse, psychological counseling)  · Do not be alone:Call your Safe Contact- someone whom you trust who will be there for you.  · Call your local CRISIS HOTLINE 991-8628 or 619-241-8464  · Call your local Children's Mobile Crisis Response Team [place] (990) 548-1488 or www.[employer]  · Call the toll free National Suicide Prevention Hotlines   · National Suicide Prevention Lifeline 311-963-NHZI (1184)  · National Hope Line Network 800-SUICIDE (731-3609)    Chronic Obstructive Pulmonary Disease  Chronic obstructive pulmonary disease (COPD) is a long-term (chronic) lung problem. When you have COPD, it is hard for air to get in and out of your lungs. Usually the condition gets worse over time, and your lungs will never return to normal. There are things you can do to keep yourself as healthy as possible.  · Your doctor may treat your condition with:  ? Medicines.  ? Oxygen.  ? Lung surgery.  · Your doctor may also recommend:  ? Rehabilitation. This includes steps to make your body work better. It may involve a team of specialists.  ? Quitting smoking, if you smoke.  ? Exercise and changes to your diet.  ? Comfort measures (palliative care).  Follow these instructions at home:  Medicines  · Take over-the-counter and prescription medicines only as told by your doctor.  · Talk to your doctor  before taking any cough or allergy medicines. You may need to avoid medicines that cause your lungs to be dry.  Lifestyle  · If you smoke, stop. Smoking makes the problem worse. If you need help quitting, ask your doctor.  · Avoid being around things that make your breathing worse. This may include smoke, chemicals, and fumes.  · Stay active, but remember to rest as well.  · Learn and use tips on how to relax.  · Make sure you get enough sleep. Most adults need at least 7 hours of sleep every night.  · Eat healthy foods. Eat smaller meals more often. Rest before meals.  Controlled breathing  Learn and use tips on how to control your breathing as told by your doctor. Try:  · Breathing in (inhaling) through your nose for 1 second. Then, pucker your lips and breath out (exhale) through your lips for 2 seconds.  · Putting one hand on your belly (abdomen). Breathe in slowly through your nose for 1 second. Your hand on your belly should move out. Pucker your lips and breathe out slowly through your lips. Your hand on your belly should move in as you breathe out.    Controlled coughing  Learn and use controlled coughing to clear mucus from your lungs. Follow these steps:  1. Lean your head a little forward.  2. Breathe in deeply.  3. Try to hold your breath for 3 seconds.  4. Keep your mouth slightly open while coughing 2 times.  5. Spit any mucus out into a tissue.  6. Rest and do the steps again 1 or 2 times as needed.  General instructions  · Make sure you get all the shots (vaccines) that your doctor recommends. Ask your doctor about a flu shot and a pneumonia shot.  · Use oxygen therapy and pulmonary rehabilitation if told by your doctor. If you need home oxygen therapy, ask your doctor if you should buy a tool to measure your oxygen level (oximeter).  · Make a COPD action plan with your doctor. This helps you to know what to do if you feel worse than usual.  · Manage any other conditions you have as told by your  doctor.  · Avoid going outside when it is very hot, cold, or humid.  · Avoid people who have a sickness you can catch (contagious).  · Keep all follow-up visits as told by your doctor. This is important.  Contact a doctor if:  · You cough up more mucus than usual.  · There is a change in the color or thickness of the mucus.  · It is harder to breathe than usual.  · Your breathing is faster than usual.  · You have trouble sleeping.  · You need to use your medicines more often than usual.  · You have trouble doing your normal activities such as getting dressed or walking around the house.  Get help right away if:  · You have shortness of breath while resting.  · You have shortness of breath that stops you from:  ? Being able to talk.  ? Doing normal activities.  · Your chest hurts for longer than 5 minutes.  · Your skin color is more blue than usual.  · Your pulse oximeter shows that you have low oxygen for longer than 5 minutes.  · You have a fever.  · You feel too tired to breathe normally.  Summary  · Chronic obstructive pulmonary disease (COPD) is a long-term lung problem.  · The way your lungs work will never return to normal. Usually the condition gets worse over time. There are things you can do to keep yourself as healthy as possible.  · Take over-the-counter and prescription medicines only as told by your doctor.  · If you smoke, stop. Smoking makes the problem worse.  This information is not intended to replace advice given to you by your health care provider. Make sure you discuss any questions you have with your health care provider.  Document Released: 06/05/2009 Document Revised: 11/30/2018 Document Reviewed: 01/22/2018  Elsevier Patient Education © 2020 [employer]

## 2020-12-03 NOTE — THERAPY
Physical Therapy   Initial Evaluation     Patient Name: Priya Callahan  Age:  70 y.o., Sex:  female  Medical Record #: [id number]  Today's Date: 12/3/2020          Assessment  Patient is 70 y.o. female with a diagnosis of COPD exacerbation.  Pt presents at or very near PLOF. Pt most likely to refuse HH services per discussion.    Plan    Recommend Physical Therapy for Evaluation only    DC Equipment Recommendations: None  Discharge Recommendations: Recommend home health for continued physical therapy services       Subjective    Pt agrees to PT.     Objective       12/03/20 0935   Prior Living Situation   Prior Services halfway Home Aide Services;Housekeeping / Homemaker Services   Housing / Facility Assisted Living Residence   Steps Into Home 0   Steps In Home 0   Equipment Owned Power Wheel Chair;4-Wheel Walker;Oxygen   Lives with - Patient's Self Care Capacity Alone and Able to Care For Self   Prior Level of Functional Mobility   Bed Mobility Other (Comments)  (pt sleep in her recliner)   Transfer Status Independent   Ambulation Independent   Distance Ambulation (Feet) 50   Assistive Devices Used 4-Wheel Walker   Wheelchair Independent   Stairs Required Assist   History of Falls   History of Falls No   Cognition    Cognition / Consciousness WDL   Level of Consciousness Alert   Passive ROM Lower Body   Passive ROM Lower Body WDL   Active ROM Lower Body    Active ROM Lower Body  WDL   Strength Lower Body   Lower Body Strength  WDL   Sensation Lower Body   Lower Extremity Sensation   WDL   Lower Body Muscle Tone   Lower Body Muscle Tone  WDL   Coordination Lower Body    Coordination Lower Body  WDL   Balance Assessment   Sitting Balance (Static) Good   Sitting Balance (Dynamic) Fair +   Standing Balance (Static) Fair +   Standing Balance (Dynamic) Fair +   Weight Shift Sitting Good   Weight Shift Standing Good   Comments with FWW   Gait Analysis   Gait Level Of Assist Supervised   Assistive Device Front  Wheel Walker   Distance (Feet) 50   # of Times Distance was Traveled 1   Deviation Bradykinetic   # of Stairs Climbed 0   Comments No LOB noted   Bed Mobility    Comments Pt up in chair upon arrival   Functional Mobility   Sit to Stand Supervised   Bed, Chair, Wheelchair Transfer Supervised   Mobility gait in room   Activity Tolerance   Standing 5 min   Patient / Family Goals    Patient / Family Goal #1 Home   Education Group   Role of Physical Therapist Patient Response Patient;Acceptance;Explanation;Verbal Demonstration;Action Demonstration   Exercises - Seated Patient Response Patient;Acceptance;Explanation;Demonstration;Handout;Action Demonstration   Exercise - Standing Patient Response Patient;Acceptance;Explanation;Demonstration;Handout;Action Demonstration   Problem List    Problems None   Anticipated Discharge Equipment and Recommendations   DC Equipment Recommendations None   Discharge Recommendations Recommend home health for continued physical therapy services

## 2020-12-03 NOTE — THERAPY
Occupational Therapy   Initial Evaluation     Patient Name: Priya Callahan  Age:  70 y.o., Sex:  female  Medical Record #: [id number]  Today's Date: 12/3/2020     Precautions  Comments: 4-5L o2 baseline    Assessment  Patient is 70 y.o. female with a diagnosis of COPD exacerbation. Appears to be near baseline     Plan    Patient will not be actively followed for occupational therapy services at this time, however may be seen if requested by physician for 1 more visit within 30 days to address any discharge or equipment needs.       DC Equipment Recommendations: None  Discharge Recommendations: Recommend home health for continued occupational therapy services     Subjective    Pleasant and cooperative     Objective       12/03/20 0920   Prior Living Situation   Prior Services halfway Home Aide Services;Housekeeping / Homemaker Services   Housing / Facility Assisted Living Residence  (independent living side)   Bathroom Set up Walk In Shower;Grab Bars;Shower Chair   Equipment Owned Front-Wheel Walker;Power Wheel Chair;Tub / Shower Seat;Grab Bar(s) In Tub / Shower;Grab Bar(s) By Toilet;Oxygen   Lives with - Patient's Self Care Capacity Alone and Able to Care For Self   Comments Pt has assistance with meals and housekeeping. Uses power chair for community   Prior Level of ADL Function   Self Feeding Independent   Grooming / Hygiene Independent   Bathing Independent   Dressing Independent   Toileting Independent   Prior Level of IADL Function   Medication Management Independent   Laundry Requires Assist   Kitchen Mobility Independent   Finances Independent   Home Management Requires Assist   Shopping Requires Assist   Prior Level Of Mobility Independent With Device in Home;Uses Wheel Chair for Community Mobility   Driving / Transportation Relatives / Others Provide Transportation   Occupation (Pre-Hospital Vocational) Retired Due To Age   Cognition    Cognition / Consciousness WDL   Level of Consciousness Alert    Comments pleasant and cooperative   Balance Assessment   Sitting Balance (Static) Fair +   Sitting Balance (Dynamic) Fair   Standing Balance (Static) Fair +   Standing Balance (Dynamic) Fair   Weight Shift Sitting Fair   Weight Shift Standing Fair   Comments w/ fww   Bed Mobility    Supine to Sit Supervised   Scooting Supervised   Rolling Supervised   ADL Assessment   Grooming Supervision;Standing  (wash face, wash hands)   Bathing Supervision  (wash alex area standing at sink)   Lower Body Dressing Supervision  (slippers, declined underwear/pants)   Toileting Supervision   Functional Mobility   Sit to Stand Supervised   Bed, Chair, Wheelchair Transfer Supervised   Toilet Transfers Supervised   Mobility EOB>BR>Chair   Comments w/ fww

## 2020-12-03 NOTE — PROGRESS NOTES
Bedside shift report received by nightshift RN, safety check completed, all patient needs met at this time. Will continue to monitor.

## 2020-12-03 NOTE — HEART FAILURE PROGRAM
Per MOHAMUD note today, pt likely to go home with home health tomorrow. Pt still doesn't have a HF f/u scheduled.     I've alerted the schedulers.    Thank you, Porsche Cardio RN Navigator [phone]    ADDENDUM 12/3/20 2737

## 2020-12-03 NOTE — PROGRESS NOTES
Assumed care of Pt, She is alert and oriented. Discussed POC with day shift RN at bedside. Bed is locked in lowest position and call light/ belongings are within reach.

## 2020-12-03 NOTE — DISCHARGE PLANNING
Received Choice form at [phone]  Agency/Facility Name: [facility]  Referral sent per Choice form @ [phone]

## 2020-12-03 NOTE — PROGRESS NOTES
Patient IV removed, belongings gathered, discharge paperwork reviewed/signed and patient verbalized understanding. Patient was then escorted to private vehicle safely.

## 2020-12-04 NOTE — DISCHARGE SUMMARY
Discharge Summary    CHIEF COMPLAINT ON ADMISSION  Chief Complaint   Patient presents with   • Shortness of Breath     Increasing SOB past 2 weeks, irena since yesterday, on %L O2 at home, now cannot get to BR without becoming severely SOB. Pt reports cough with green and bloody sputum.       Reason for Admission  Shortness of Breath     Admission Date  11/29/2020    CODE STATUS  Full code    HPI & HOSPITAL COURSE  70 y.o. female with PMH of severe COPD on 4-5 L home O2, CKD stage IV, DM2 uncontrolled with renal complications, hypothyroidism, HTN, and OA who presented with shortness of breath, wheezing, productive cough, and neutrophilic leukocytosis. Treated for COPD exacerbation and CHF exacerbation with breathing treatments, prednisone, azithromycin, and furosemide. Following treatment, patient clinically improved back to baseline.      Shortness of breath  Acute exacerbation of COPD  Acute exacerbation of CHF  On admission,  patient presented with edema, SOB, and significant inspiratory sounds and wheezing sounds and CXR showed pulmonary edema and/or infiltrates. To treat likely underlying infection, patient started on prednisone 40 daily for 5 days and azithromycin 500 mg daily for 3 days. Also due to significant wheezing on exam, patient started on DuoNebs every four hours. There was some concern for HFpEF, as patient had echocardiogram in July 2020 with EF of 80%. Throughout admission, patient was diuresed with furosemide due to suspected HF exacerbation as well. Unclear exact amounts of urine output, but per nursing notes, patient diuresed total output of 1800cc per shift while on furosemide. She remained on 5L O2, and maintained saturation %. Patient reported that she felt she was breathing better and back to her normal condition.      Hyperkalmia  CKD stage IV  Diabetes Mellitus type 2, uncontrolled, with renal complications  On admission, started on 20 units long acting, and throughout admission  patient mostly remained within goal of glucose < 150. Initially started on diabetic diet and lasix started. However potassium maximilian from 5.4 to 6.2 overnight, and noted on 12/2 AM. Immediately administered calcium gluconate, insulin, dextrose 50, and ordered STAT EKG. This showed sinus rhythm and some T wave abnormalities. Repeat BMPs showed resolution of potassium to 5.4. On discharge day, potassium of 5.3. Patient changed to renal diabetic diet and lasix continued. Once she appeared euvolemic on physical exam, furosemide stopped. Not discharged on furosemide.     Symptomatic anemia  On admission, HGB low so patient was transfused 1 unit of PRBC. Overnight early 12/2, HGB fell to 6.6, so transfused with another 1 unit of PRBC. Iron studies consistent with inflammatory disease, as iron 53, TIBC 201, % saturation 26, unsat iron 148, and ferritin 1511. No concern for active bleeding during admission.      Physical Exam  Constitutional: Normal appearance. She is obese. She is not ill-appearing, toxic-appearing or diaphoretic.   Cardiovascular: Normal rate and regular rhythm. No murmur.   Pulmonary: Improved inspiratory and expiratory sounds and wheezing present. Loud insp and exp sounds  Abdominal: Abdomen is flat, soft, non distended. There is no abdominal tenderness or guarding.   Musculoskeletal: Normal range of motion. No swelling or deformity.   Skin: Skin is warm, dry, not jaundiced. Venous stasis changes on lower ext   Neurological: She is alert and oriented to person, place, and time. No cranial nerve deficit. No weakness.   Psychiatric: Mood normal, behavior normal, thought content normal, and judgment normal.     Therefore, she is discharged in good and stable condition to home with close outpatient follow-up. (assisted living)    The patient met 2-midnight criteria for an inpatient stay at the time of discharge.    Discharge Date  12/3/2020    FOLLOW UP ITEMS POST DISCHARGE  Follow up with PCP    DISCHARGE  DIAGNOSES  Principal Problem:    Acute exacerbation of chronic obstructive pulmonary disease (COPD) ([facility]) POA: Unknown      Overview: On oxygen 3L nocturnal  Active Problems:    DM type 2, uncontrolled, with renal complications ([facility]) (Chronic) POA: Yes    Symptomatic anemia POA: Unknown    Acute exacerbation of CHF (congestive heart failure) ([facility]) POA: Unknown    Morbid obesity ([facility]) (Chronic) POA: Yes    Tobacco abuse POA: Unknown    Hypothyroidism (Chronic) POA: Yes  Resolved Problems:    * No resolved hospital problems. *    FOLLOW UP  No future appointments.  Crystal Ramos M.D.  [address] [facility] [address]  [phone]    Go on 12/10/2020  Please walk in 9am to be seen as a walk in appointment. Thank you     MEDICATIONS ON DISCHARGE     Medication List      START taking these medications      Instructions   predniSONE 20 MG Tabs  Commonly known as: DELTASONE   Take 1 Tab by mouth every day for 2 days.  Dose: 20 mg        CHANGE how you take these medications      Instructions   amLODIPine 10 MG Tabs  What changed: Another medication with the same name was removed. Continue taking this medication, and follow the directions you see here.  Commonly known as: NORVASC   Take 10 mg by mouth every day.  Dose: 10 mg        CONTINUE taking these medications      Instructions   albuterol 108 (90 Base) MCG/ACT Aers inhalation aerosol   Inhale 2 Puffs by mouth every four hours as needed for Shortness of Breath.  Dose: 2 Puff     amitriptyline 50 MG Tabs  Commonly known as: ELAVIL   Take 50 mg by mouth every evening.  Dose: 50 mg     cyclobenzaprine 10 mg Tabs  Commonly known as: Flexeril   Take 1 Tab by mouth every bedtime.  Dose: 10 mg     Diclofenac Sodium 1 % Gel   Apply 2 g to skin as directed 2 times a day as needed (for pain).  Dose: 2 g     DULoxetine 60 MG Cpep delayed-release capsule  Commonly known as: CYMBALTA   TAKE 1 CAPSULE BY MOUTH DAILY     hydrOXYzine HCl 25 MG Tabs  Commonly known as:  "ATARAX   Take 1 Tab by mouth 3 times a day as needed for Anxiety.  Dose: 25 mg     insulin glargine 100 UNIT/ML Soln  Commonly known as: Lantus   Inject 20 Units as instructed every evening.  Dose: 20 Units     ipratropium-albuterol 0.5-2.5 (3) MG/3ML nebulizer solution  Commonly known as: DUONEB   3 mL by Nebulization route every 6 hours as needed for Shortness of Breath.  Dose: 3 mL     levothyroxine 75 MCG Tabs  Commonly known as: SYNTHROID   Take 1 Tab by mouth Every morning on an empty stomach.  Dose: 75 mcg     lidocaine 5 % Oint  Commonly known as: XYLOCAINE   Apply 1 Each topically as needed (pain).  Dose: 1 Each     oxyCODONE immediate-release 5 MG Tabs  Commonly known as: ROXICODONE   Take 10 mg by mouth every evening.  Dose: 10 mg     rosuvastatin 10 MG Tabs  Commonly known as: CRESTOR   TAKE 1 TABLET BY MOUTH EVERY EVENING     traZODone 150 MG Tabs  Commonly known as: DESYREL   Take 300 mg by mouth every evening.  Dose: 300 mg     Trelegy Ellipta 100-62.5-25 MCG/INH Aepb  Generic drug: Fluticasone-Umeclidin-Vilant   INHALE 1 PUFF BY MOUTH DAILY        STOP taking these medications    furosemide 80 MG Tabs  Commonly known as: LASIX     hydrALAZINE 25 MG Tabs  Commonly known as: APRESOLINE     nystatin/triamcinolone 333588-5.1 UNIT/GM-% Crea  Commonly known as: MYCOLOG          Allergies  Allergies   Allergen Reactions   • Doxycycline Itching   • Vitamin C Diarrhea     \"violent diarrhea\"   • Codeine Nausea     Severe stomach pain   • Gabapentin Palpitations     Gets shaky and falls    • Keflex Rash     Severe rash, but TOLERATES PENICILLINS, Rocephin    • Lyrica Nausea     Nausea, dizzy   • Powders Unspecified     Unknown    • Sudafed Nausea and Unspecified     Chest pain, nausea     DIET  No orders of the defined types were placed in this encounter.    ACTIVITY  As tolerated.  Weight bearing as tolerated    CONSULTATIONS  None    PROCEDURES  None    LABORATORY  Lab Results   Component Value Date    SODIUM " 137 12/03/2020    POTASSIUM 5.3 12/03/2020    CHLORIDE 104 12/03/2020    CO2 28 12/03/2020    GLUCOSE 76 12/03/2020    BUN 64 (H) 12/03/2020    CREATININE 2.23 (H) 12/03/2020      Lab Results   Component Value Date    WBC 16.3 (H) 12/03/2020    HEMOGLOBIN 8.5 (L) 12/03/2020    HEMATOCRIT 27.2 (L) 12/03/2020    PLATELETCT 233 12/03/2020

## 2020-12-14 NOTE — DOCUMENTATION QUERY
[facility]                                                                       Query Response Note      PATIENT:               CHAITANYA CABELLO  ACCT #:                  [id number]  MRN:                     [id number]  :                      1950  ADMIT DATE:       2020 9:00 PM  DISCH DATE:        12/3/2020 1:09 PM  RESPONDING  PROVIDER #:        [id number]           QUERY TEXT:    Uncontrolled diabetes is documented in the medical record.  There is no code in ICD 10 for uncontrolled diabetes.  Further clarification is needed:      NOTE:  If an appropriate response is not listed below, please respond with a new note.                The patient's Clinical Indicators include:  Discharge Summary Dr. Van Trammell 12/3/2020  HPI & Hospital Course  Diabetes Mellitus Type 2, uncontrolled, with renal complications  On admission, started on 20 units long acting, and throughout admission patient mostly remained within goal of glucose <150. Initially started on diabetic diet and lasix started.     Glucose   21:58 129H   07:23 125H   11:11 180H   16:06 139H   20:49 217H   5:59 111H   11:47 248H   16:48 122H   20:57 99   06:20 75   11:57 232H  Options provided:   -- Uncontrolled diabetes meaning with hypoglycemia   -- Uncontrolled diabetes meaning with hyperglycemia   -- Findings of no clinical significance   -- Unable to determine      Query created by: Kellee Ahmadi on 2020 3:43 PM    RESPONSE TEXT:    Uncontrolled diabetes meaning with hyperglycemia          Electronically signed by:  VAN TRAMMELL MD 2020 4:05 PM

## 2021-01-06 ENCOUNTER — HOSPITAL ENCOUNTER (OUTPATIENT)
Facility: MEDICAL CENTER | Age: 71
End: 2021-01-06
Payer: COMMERCIAL

## 2021-01-07 LAB
COVID ORDER STATUS COVID19: NORMAL
SARS-COV-2 RNA RESP QL NAA+PROBE: NOTDETECTED
SPECIMEN SOURCE: NORMAL

## 2021-09-03 NOTE — CARE PLAN
Problem: Safety  Goal: Will remain free from injury  Outcome: PROGRESSING AS EXPECTED     Problem: Infection  Goal: Will remain free from infection  Outcome: PROGRESSING AS EXPECTED     Problem: Pain Management  Goal: Pain level will decrease to patient's comfort goal  Outcome: PROGRESSING AS EXPECTED     Problem: Fluid Volume:  Goal: Will maintain balanced intake and output  Outcome: PROGRESSING AS EXPECTED     Problem: Psychosocial Needs:  Goal: Level of anxiety will decrease  Outcome: PROGRESSING AS EXPECTED      Attending Statement:. I saw and evaluated the patient. I discussed the patient's case with the Resident.     Patient was seen via face-to-face    Jessica Carreon MD

## 2021-11-18 NOTE — PROGRESS NOTES
[facility] PHYSICIANS  [facility]  [address] [place] [address]  Dept: [phone]  Dept Fax: [phone]     PAIN PROGRESS NOTE  Date of patient's visit: 11/18/2021  Patient's Name:  Mary Buchanan YOB: 1947            Patient Care Team:  Ron Gallo DO as PCP - General (Family Medicine)  Ron Gallo DO as PCP - [facility]  Robin Reyes DPM as Physician (Podiatry)      Chief Complaint   Patient presents with    Nail Problem    Foot Swelling       Subjective: This Mary Buchanan comes to clinic for nail check. Pt. Relates pain to nails with shoe gear. Pt's primary care physician is Ron Gallo DO last seen11/2/2021. Pt has a new complaint of pulling her sock off and the great toenail ripped off and caused pain and bled.    Past Medical History:   Diagnosis Date    Anxiety     Arthritis     Cataracts, bilateral     Essential (primary) hypertension 3/6/2019    Fracture (healed) treatment follow-up 2015    hand fx post fall    Mental retardation     Movement disorder     Primary osteoarthritis involving multiple joints 2/17/2017    Rheumatoid arthritis ([facility] [address]     Seizures (HCC)        No Known Allergies  Current Outpatient Medications on File Prior to Visit   Medication Sig Dispense Refill    sulfamethoxazole-trimethoprim (BACTRIM DS;SEPTRA DS) 800-160 MG per tablet Take 1 tablet by mouth 2 times daily for 7 days 14 tablet 0    carBAMazepine (CARBATROL) 200 MG extended release capsule Take 4 capsules by mouth 2 times daily 240 capsule 3    bumetanide (BUMEX) 1 MG tablet Take 1 tablet by mouth daily 30 tablet 5    divalproex (DEPAKOTE) 500 MG DR tablet TAKE 2 TABLETS BY MOUTH TWICE A  tablet 5    divalproex (DEPAKOTE) 250 MG DR tablet TAKE 1 TABLET BY MOUTH TWICE A DAY 60 tablet 5    zinc oxide (DESITIN) 40 % ointment Apply topically 2 times as needed to blistered and dry areas 113 g 2    [facility] ICU Progress Note      Admit Date: 6/21/2017  ICU Day: 4    Resident(s): Carito Richards  Attending: HORACE RYAN/ Dr. Hope    Date & Time:   6/25/2017   7:00 AM       Patient ID:    Name:             Priya Callahan   YOB: 1950  Age:                 66 y.o.  female   MRN:               [id number]    HPI:  67 y/o female admitted to ICU after intubation for AHRF 2/2 AECOPD     Consultants:  PMA: Dr. Hope    Interval Events:  06/22/2017: Remains on vent, starting tube feeds.  Bronch'd today with tan secretions.  Cultures NGTD.  Continue current ABX.  Hyperkalemia resolved.  Renal function stable. Start lantus 15u for elevated sugars.      06/23/17 : No significant overnight events. She is on Vent support /20/8/35% FiO2 Her ABG today 7.35/44/79/24.3 getting better. On Propofol , Pressors and TF @45.  Recieving Abx Zosyn. Her HBG was 6.7 in AM , 1U PRBC given. Will recheck at 4.30 PM. SBT trials, Mobilization and plans of Extubation if she tolerates SBT.    06/24/17: Agitated overnight, biting on tube, bite guard placed, O2 improved.  Good UOP, CVP: 17.  Repleted electrolytes.  Cultures negative, de-escalate ABX, stop vanco/zosyn, start unasyn.  DC PICC line.  Repeat procalcitonin.  Reduce steroid frequency from Q6H to Q8H.    Sugars uncontrolled, Lantus 25u QAM.  Tolerated 2 hours SBT yesterday, no SBT yet today.  Possibly extubate today.      6/25 - agitated overnight requiring mutilple doses of ativan.  Appeared very uncomfortable on decreased doses of sedation today with increasing RR and decreasing sats.  Propofol gtt resumed. Cultures still ngtd.    Review of Systems   Unable to perform ROS: intubated       PHYSICAL EXAM  Gen: NAD  HEENT: NC/AT, no scleral icterus, no conjunctival injection, mucous membranes pink and moist.  Neck: Supple, no lymphadenopathy.  Cardiac: S1S2, RRR, no m/r/g, no JVD.  Respiratory: Normal effort, symmetrical, CTA b/l.  Abdomen: BS+, soft,  NT/ND, no rebound/guarding, no palpable organomegaly.  Ext: pedal edema, 2+ DP pulses b/l.  Skin: Warm, dry. No rashes or erythema.   Neuro: intubated and sedated    Respiratory:  Ballard Vent Mode: APVCMV  Respiration: (!) 34, Pulse Oximetry: 100 %    Chest Tube Drains:    Recent Labs      17   0417  17   0440  17   0413   ISTATAPH  7.350*  7.355*  7.392*   ISTATAPCO2  44.0*  38.7*  36.0   ISTATAPO2  79  53*  68   ISTATATCO2  26  23  23   [id number]  95  85*  93   ISTATARTHCO3  24.3  21.6  21.9   ISTATARTBE  -1  -4  -3   ISTATTEMP  97.2 F  97.7 F  97.5 F   ISTATFIO2  35  30  40   ISTATSPEC  Arterial  Arterial  Arterial   ISTATAPHTC  7.362*  7.363*  7.401   [id number]  75  51*  65       HemoDynamics:  Pulse: 72, Heart Rate (Monitored): 71 NIBP: (!) 161/72 mmHg CVP (mm Hg): (!) 29 MM HG    Neuro:      Fluids:        Intake/Output Summary (Last 24 hours) at 17 0700  Last data filed at 17 0600   Gross per 24 hour   Intake 2948.63 ml   Output   2680 ml   Net 268.63 ml       Weight: 104.4 kg (230 lb 2.6 oz)  Body mass index is 38.91 kg/(m^2).    Recent Labs      17   0502  17   0540  17   0531   SODIUM  141  141  146*   POTASSIUM  4.0  3.6  3.8   CHLORIDE  109  111  115*   CO2  23  22  22   BUN  34*  46*  54*   CREATININE  1.23  1.20  1.05   MAGNESIUM  2.1  2.1  2.2   PHOSPHORUS  3.7  3.3  3.5   CALCIUM  8.5  8.8  9.1       GI/Nutrition:  Recent Labs      17   0502  17   0540  17   0531   GLUCOSE  269*  310*  162*       Heme:  Recent Labs      17   1440  17   0540  17   0531   RBC  2.84*  2.93*  2.98*   HEMOGLOBIN  8.1*  8.3*  8.4*   HEMATOCRIT  26.0*  27.2*  27.9*   PLATELETCT  209  210  256       Infectious Disease:  Monitored Temp  Av.7 °C (98.1 °F)  Min: 36.3 °C (97.3 °F)  Max: 37.4 °C (99.3 °F)  Recent Labs      17   1440  17   0540  17   0531   WBC  16.4*  14.8*  13.3*   NEUTSPOLYS  95.30*  95.90*  92.00*  enoxaparin (LOVENOX) 40 MG/0.4ML injection Inject into the skin 2 times daily      alendronate (FOSAMAX) 70 MG tablet Take 1 tablet by mouth every 7 days Indications: Sundays 25 tablet 0    atorvastatin (LIPITOR) 40 MG tablet Take 1 tablet by mouth nightly 30 tablet 3    STOOL SOFTENER 100 MG capsule TAKE 1 CAPSULE BY MOUTH TWICE A DAY AS NEEDED FOR CONSTIPATION 28 capsule 3    naproxen (NAPROSYN) 500 MG tablet TAKE 1 TABLET BY MOUTH TWICE A DAY WITH MEALS 60 tablet 5     No current facility-administered medications on file prior to visit. Review of Systems. Review of Systems:   History obtained from chart review and the patient  General ROS: negative for - chills, fatigue, fever, night sweats or weight gain  Constitutional: Negative for chills, diaphoresis, fatigue, fever and unexpected weight change. Musculoskeletal: Positive for arthralgias, gait problem and joint swelling. Neurological ROS: negative for - behavioral changes, confusion, headaches or seizures. Negative for weakness and numbness. Dermatological ROS: negative for - mole changes, rash  Cardiovascular: Negative for leg swelling. Gastrointestinal: Negative for constipation, diarrhea, nausea and vomiting. Objective:  Dermatologic Exam:  Skin lesion/ulceration Absent . Skin No rashes or nodules noted. .   Skin is thin, with flaky sloughing skin as well as decreased hair growth to the lower leg  Small red hemosiderin deposits seen dorsal foot   Musculoskeletal:     1st MPJ ROM decreased, Bilateral.  Muscle strength 5/5, Bilateral.  Pain present upon palpation of  The left great toenail that is detatch 80%  there is erythema present and sangranous fluid .  decreased medial longitudinal arch, Bilateral.  Ankle ROM decreased,Bilateral.    Dorsally contracted digits present digits 2, Bilateral.     Vascular: DP pulses 1/4 bilateral.  PT pulses 0/4 bilateral.   CFT <5 seconds, Bilateral.  Hair growth absent to the level of the digits,   LYMPHOCYTES  1.20*  1.60*  2.70*   MONOCYTES  1.70  1.30  3.20   EOSINOPHILS  0.00  0.00  0.00   BASOPHILS  0.10  0.10  0.10       Meds:  • lorazepam  1 mg     • famotidine  20 mg     • ampicillin-sulbactam (UNASYN) IV  3 g Stopped (06/25/17 0550)   • methylPREDNISolone  62.5 mg     • insulin glargine  25 Units     • dexmedetomidine (PRECEDEX) infusion 4 mcg/ml  0.1-1.5 mcg/kg/hr 1 mcg/kg/hr (06/25/17 0613)   • hydrALAZINE  10 mg     • hydrOXYzine  50 mg     • Respiratory Care per Protocol       • senna-docusate  2 Tab      And   • polyethylene glycol/lytes  1 Packet      And   • magnesium hydroxide  30 mL      And   • bisacodyl  10 mg     • chlorhexidine  15 mL     • lidocaine  1-2 mL     • MD ALERT...Adult ICU Electrolyte Replacement per Pharmacy Protocol       • fentaNYL  25 mcg      Or   • fentaNYL  50 mcg      Or   • fentaNYL  100 mcg     • ipratropium-albuterol  3 mL     • insulin lispro  2-9 Units     • glucose 4 g  16 g      And   • dextrose 50%  25 mL     • propofol  5-80 mcg/kg/min Stopped (06/24/17 4799)   • heparin  5,000 Units     • levothyroxine  75 mcg     • montelukast  10 mg     • acetaminophen  650 mg     • ipratropium-albuterol  3 mL          Procedures:  6/22 - intuabation, bronchoscopy, and R IJV CVC placement    Imaging:  DX-CHEST-PORTABLE (1 VIEW)   Final Result      Findings consistent with pulmonary edema, with no significant change.      DX-CHEST-PORTABLE (1 VIEW)   Final Result      Stable chest appearance compared with 6/23.      DX-CHEST-PORTABLE (1 VIEW)   Final Result      Stable chest appearance compared with 6/22.      DX-ABDOMEN FOR TUBE PLACEMENT   Final Result      Feeding tube and nasogastric tube in place as noted above.      DX-CHEST-PORTABLE (1 VIEW)   Final Result      Right central venous line in place, with no pneumothorax. Improving pulmonary edema.      DX-CHEST-PORTABLE (1 VIEW)   Final Result      Mid and lower zone opacities, most consistent with pulmonary edema,  Bilateral.  Edema present, Bilateral.  Varicosities absent, Bilateral. Erythema absent, Bilateral    Neurological: Sensation diminshed to light touch to level of digits, Bilateral.  Protective sensation intact 6/10 sites via 5.07/10g Washburn-Johanna Monofilament, Bilateral.  negative Tinel's, Bilateral.  negative Valleix sign, Bilateral.      Integument: Warm, dry, supple, Bilateral.  Open lesion absent, Bilateral.  Interdigital maceration absent to web spaces 4, Bilateral.  Fissures absent, Bilateral.   General: AAO x 3 in NAD. Q7   []Yes  []No                Q8   [x]Yes  []No                     Q9   []Yes    []No  Assessment:  75 y.o. female with:    Diagnosis Orders   1. Dermatophytosis of nail     2. Ingrowing nail  98769 - NE REMOVAL OF NAIL PLATE   3. Left foot pain  61145 - NE REMOVAL OF NAIL PLATE         Plan:   Pt was evaluated and examined. Patient was given personalized discharge instructions. After verbal consent and following 5 ml of 1% lidocaine the left great toenail was removed      Verbal consent obtained for left great toe nail avulsion  after all questions answered. Local anesthesia obtained via Hallux block to the Left hallux utilizing 5 cc of 1% Lidocaine plain. Next the Left hallux was prepped with Betadine. A digital tourniquet was applied. A freer elevator was used to free the total  nail border. An english anvil was used to remove the offending border in total.  A curette was used to ensure the offending border was free of any remaining nail. Triple antibiotic ointment was applied to the nail border followed by a semi-compressive dressing. The patient was instructed to leave this dressing clean/dry/intact until the following am at which point they will begin warm epsom salt soaks followed by application of antibiotic ointment and Band-Aid BID. Patient instructed to take Tylenol PRN pain. Post-operativenail matrixectoy  instruction sheet dispensed to patient. . Pt will follow up in 20 weeks or sooner if any problems arise. Diagnosis was discussed with the pt and all of their questions were answered in detail. Proper foot hygiene and care was discussed with the pt. Patient to check feet daily and contact the office with any questions/problems/concerns. Other comorbidity noted and will be managed by PCP. Pain waiver discussed with patient and confirmed. All labs were reviewed and all imagining including the above findings were reviewed PRIOR to the patients arrival and with the patient today. Previous patient encounter was reviewed. Encounters from the patients other medical providers were reviewed and noted. Time was spent educating the patient and their families/caregivers on proper care of the feet and ankles. All the above diagnosis were addressed at todays visit and all questions were answered.   A total of 30 minutes was spent with this patients encounter which included charting after the patients visit    11/18/2021      Electronically signed by Mario Khanna DPM on 11/18/2021 at 1:24 PM  11/18/2021 overall worse compared with 6/13/2017.          Problem and Plan:    ACUTE HYPOXIC RESPIRATORY FAILURE  CHRONIC RESPIRATORY FAILURE  AECOPD  - CXR shows worsened bilateral LL infiltrates; echo normal in 3/2017; LE US neg for DVT in 6/2017; CTPE neg in 6/2017; echo in 2015 shows normal EF with G2 diastolic dysfunction and pulmonary htn; likely 2/2 COPD exacerbation 2/2 HCAP; intubated and sedated; RT protocol; solumedrol; abx as below    SEPSIS 2/2 RESPIRATORY SOURCE  HCAP  LEUKOCYTOSIS  - multiple recent hospital admissions from nursing home; cultures from prior admission all neg; UA neg; CXR shows worsened infiltrates on admission; bl/urine/BAL cultures NGTD; continue IVF hydration and unasyn; procalcitonin trending down; continue unasyn to complete 7 day abx course    CHRONIC NORMOCYTIC ANEMIA - h/h stable since last discharge    HYPERKALEMIA - resolved    CKD-3 - stable at baseline; avoid nephrotoxins    DM2 - a1c 5.2; Lantus and SSI with Accuchecks; hypoglycemia protocol    HTN -  Will resume home meds as patient is off pressors and BP is elevated; hydralazine prn    CHRONIC HEP C - stable    FIBROMYALGIA - hold home pain meds    ASTHMA - continue monteleukast    HYPOTHYROIDISM - continue home synthroid      DISPO: ICU management of AHRF     CODE STATUS: FULL    Quality Measures:  Mckeon Catheter: yes  DVT Prophylaxis: heparin  Ulcer Prophylaxis: pepcid  Antibiotics: unasyn  Lines: R IJV CVC

## 2021-11-22 ENCOUNTER — APPOINTMENT (OUTPATIENT)
Dept: MEDICAL GROUP | Facility: MEDICAL CENTER | Age: 71
End: 2021-11-22
Payer: MEDICARE

## 2022-05-22 NOTE — TELEPHONE ENCOUNTER
Has upcoming appt w/ PCP. Will send 1 fill to pharmacy.    
Was the patient seen in the last year in this department? Yes     Does patient have an active prescription for medications requested? No     Received Request Via: Patient      Pt met protocol?: Yes, OV last month      
[id number], [address], [place], [address]

## 2023-01-27 NOTE — PROGRESS NOTES
12 hour chart check   Writer attempt #3 to call pt with the help of a [facility]  regarding message below. No answer/busy.    Third attempt, letter will be sent.    Closing encounter.    MO WatkinsN, RN   [facility]

## 2023-07-11 NOTE — PROGRESS NOTES
12 hour chart check   Detail Level: Simple Instructions: This plan will send the code FBSE to the PM system.  DO NOT or CHANGE the price. Price (Do Not Change): 0.00

## 2023-09-07 NOTE — ED NOTES
Pt medicated per MAR.    Infliximab Counseling:  I discussed with the patient the risks of infliximab including but not limited to myelosuppression, immunosuppression, autoimmune hepatitis, demyelinating diseases, lymphoma, and serious infections.  The patient understands that monitoring is required including a PPD at baseline and must alert us or the primary physician if symptoms of infection or other concerning signs are noted.

## 2023-09-08 NOTE — DISCHARGE INSTRUCTIONS
Discharge Instructions    Discharged to home by car with friend. Discharged via walking, hospital escort: Refused.  Special equipment needed: Not Applicable    Be sure to schedule a follow-up appointment with your primary care doctor or any specialists as instructed.     Discharge Plan:   Diet Plan: Discussed  Activity Level: Discussed  Smoking Cessation Offered: Patient Counseled  Confirmed Follow up Appointment: Patient to Call and Schedule Appointment  Confirmed Symptoms Management: Discussed  Medication Reconciliation Updated: No (Comments)  Influenza Vaccine Indication: Not indicated: Previously immunized this influenza season and > 8 years of age    I understand that a diet low in cholesterol, fat, and sodium is recommended for good health. Unless I have been given specific instructions below for another diet, I accept this instruction as my diet prescription.   Other diet: diabetic    Special Instructions: None    · Is patient discharged on Warfarin / Coumadin?   No     Depression / Suicide Risk    As you are discharged from this [facility] facility, it is important to learn how to keep safe from harming yourself.    Recognize the warning signs:  · Abrupt changes in personality, positive or negative- including increase in energy   · Giving away possessions  · Change in eating patterns- significant weight changes-  positive or negative  · Change in sleeping patterns- unable to sleep or sleeping all the time   · Unwillingness or inability to communicate  · Depression  · Unusual sadness, discouragement and loneliness  · Talk of wanting to die  · Neglect of personal appearance   · Rebelliousness- reckless behavior  · Withdrawal from people/activities they love  · Confusion- inability to concentrate     If you or a loved one observes any of these behaviors or has concerns about self-harm, here's what you can do:  · Talk about it- your feelings and reasons for harming yourself  · Remove any means that you might  S/w pt who states her Transplant Team at [facility] includes a Hematologist, the team can be contacted by calling [phone]  Pt states she had a \"small reaction\" to IV iron sucrose in the past, tongue neck and face started itching.   Pt states she tolerated subsequent  IV iron sucrose after being premedicated with benadryl.    Dr Olsen switched her to ferric carboxymaltose 750 mg of iron to avoid need for pre medication.    Dr Vance pt is asking does she need to establish with Heme Onc locally to have infusion, she does not want to add another Hematologist  If not needed or can you reach out to [facility] team [phone].   Pt does not want to have to drive to [place] for infusion.   use to hurt yourself (examples: pills, rope, extension cords, firearm)  · Get professional help from the community (Mental Health, Substance Abuse, psychological counseling)  · Do not be alone:Call your Safe Contact- someone whom you trust who will be there for you.  · Call your local CRISIS HOTLINE 599-1191 or 355-211-5257  · Call your local Children's Mobile Crisis Response Team [place] (082) 051-8874 or www.[employer]  · Call the toll free National Suicide Prevention Hotlines   · National Suicide Prevention Lifeline 500-322-CYDD (3226)  · National Hope Line Network 800-SUICIDE (340-9290)

## 2023-12-06 NOTE — PROGRESS NOTES
Nephrology Daily Progress Note    Date of Service  6/18/2019    Chief Complaint  68 y.o. Female with CKD III admitted 6/14/2019 with SOB, noted to have mild HAN on CKD    Interval Problem Update  Feeling better, denies worsening SOB. Denies chest pain, hoping to go home soon.   Feels diuretic effect with lasix 40mg but not with 20mg. She was only taking once a day at home.     Review of Systems  Review of Systems   Constitutional: Negative for fever.   Respiratory: Negative for shortness of breath.    Cardiovascular: Negative for chest pain.   Gastrointestinal: Negative for abdominal pain.   All other systems reviewed and are negative.       Physical Exam  Temp:  [36.4 °C (97.5 °F)-36.6 °C (97.9 °F)] 36.6 °C (97.9 °F)  Pulse:  [62-98] 62  Resp:  [16-20] 16  BP: (102-159)/(53-71) 102/60  SpO2:  [92 %-98 %] 98 %    Physical Exam   Constitutional: She is oriented to person, place, and time. She appears well-developed. No distress.   Eyes: EOM are normal. No scleral icterus.   Neck: No tracheal deviation present.   Cardiovascular: Normal heart sounds.    No murmur heard.  Pulmonary/Chest: Effort normal. No stridor. She has no rales.   Abdominal: Soft. There is no tenderness.   Obese    Musculoskeletal: Normal range of motion. She exhibits edema (2+ LE).   Neurological: She is alert and oriented to person, place, and time.   Skin: Skin is warm and dry.   Psychiatric: She has a normal mood and affect.       Fluids  No intake or output data in the 24 hours ending 06/18/19 1547    Laboratory  Recent Labs      06/16/19   0100   06/16/19   1713  06/17/19   0101  06/18/19   0234   WBC  11.5*   --    --   11.7*  21.3*   RBC  2.66*   --    --   2.86*  2.80*   HEMOGLOBIN  7.5*   < >  7.4*  8.0*  8.0*   HEMATOCRIT  24.9*   --    --   26.3*  26.4*   MCV  93.6   --    --   92.0  94.3   MCH  28.2   --    --   28.0  28.6   MCHC  30.1*   --    --   30.4*  30.3*   RDW  58.9*   --    --   54.4*  56.8*   PLATELETCT  216   --    --   223   229   MPV  9.3   --    --   9.5  9.3    < > = values in this interval not displayed.     Recent Labs      06/16/19   0100  06/17/19   0101  06/18/19   0234   SODIUM  140  134*  133*   POTASSIUM  4.5  4.9  5.0   CHLORIDE  101  99  99   CO2  30  26  26   GLUCOSE  171*  393*  324*   BUN  34*  35*  45*   CREATININE  2.40*  2.01*  1.96*   CALCIUM  8.5  8.9  9.1                   Imaging  Renal US unrenarkable    Assessment/Plan  1.HAN/CKD III - I suspect she simply has mild progression of CKD. No acute need for HD. Avoid nephrotoxins. Check daily labs.    2. Anemia, stable. Likely some anemia of chronic disease in addition to iron deficiency anemia. Check CBC daily. Recommend Iron supplement.     3. Volume overload. Increase lasix to 40mg PO BID and continue BID dosing on discharge.     4. Hyponatremia, worsening. Check labs daily. Discussed with patient limiting fluids only to when she is thirsty.     Nephrology will sign off. Patient will need follow up in nephrology clinic.         Eric Pompa MD  Nephrology    patient admitted for altered mental status. patient admitted for altered mental status. patient admitted for altered mental status.

## 2024-04-13 NOTE — PROGRESS NOTES
Patient Priya Callahan was admitted to [facility] on 4/14/19 for COPD exacerbation.  The patient was discharged on 4/19/19 and instructed to follow up with her PCP, Pulmonology and Nephrology.  The patient successfully filled her discharge medications.  The patient followed up with Pulmonology on 4/22/19 and Nephrology on 4/30/19/  The patient was scheduled to follow up with her PCP on 4/25/19, however, she was unable to make this appointment and so this patient advocate got her rescheduled for 5/2/19, however, the patient rescheduled this appointment and followed up with urgent care on 5/20/19.  The patient was also scheduled for a telemed appointment with Pulmonology on 5/20/19, however, the provider cancelled as the telemed equipment was not working properly. The patient is rescheduled for telemed with Pulmonology on 7/1/19.  The patient was also scheduled to follow up with her PCP on 6/5/19 but the provider cancelled this appointment due to personal reasons.  This patient advocate got the patient rescheduled for 6/12/19.  The patient also has another future appointment with her PCP on 6/26/19.  PPS 70%.   Attending to bill

## 2024-05-10 NOTE — PROGRESS NOTES
Monitor Summary    SR [id number]    PACs    .20/.12/.42     Patient called and stated that she is anxiously waiting her results.  Patient stated that if at all possible if she could be called with results before the weekend.
